# Patient Record
Sex: FEMALE | Race: WHITE | Employment: OTHER | URBAN - METROPOLITAN AREA
[De-identification: names, ages, dates, MRNs, and addresses within clinical notes are randomized per-mention and may not be internally consistent; named-entity substitution may affect disease eponyms.]

---

## 2017-04-25 ENCOUNTER — OFFICE VISIT (OUTPATIENT)
Dept: CARDIOLOGY CLINIC | Age: 77
End: 2017-04-25

## 2017-04-25 VITALS
SYSTOLIC BLOOD PRESSURE: 124 MMHG | WEIGHT: 175 LBS | DIASTOLIC BLOOD PRESSURE: 70 MMHG | OXYGEN SATURATION: 97 % | HEART RATE: 77 BPM | BODY MASS INDEX: 29.16 KG/M2 | RESPIRATION RATE: 16 BRPM | HEIGHT: 65 IN

## 2017-04-25 DIAGNOSIS — R60.9 SWELLING: ICD-10-CM

## 2017-04-25 DIAGNOSIS — R06.02 SOB (SHORTNESS OF BREATH): ICD-10-CM

## 2017-04-25 DIAGNOSIS — Z00.00 ROUTINE CHECK-UP: ICD-10-CM

## 2017-04-25 DIAGNOSIS — I25.10 CORONARY ARTERY DISEASE INVOLVING NATIVE CORONARY ARTERY OF NATIVE HEART WITHOUT ANGINA PECTORIS: Primary | ICD-10-CM

## 2017-04-25 DIAGNOSIS — I48.0 PAROXYSMAL ATRIAL FIBRILLATION (HCC): ICD-10-CM

## 2017-04-25 RX ORDER — PRAVASTATIN SODIUM 20 MG/1
20 TABLET ORAL
Qty: 90 TAB | Refills: 3 | Status: SHIPPED | OUTPATIENT
Start: 2017-04-25 | End: 2017-04-27 | Stop reason: SDUPTHER

## 2017-04-25 NOTE — PROGRESS NOTES
HISTORY OF PRESENT ILLNESS  Jacqueline Montgomery is a 77 y.o. female. She had bypass surgery about two years ago. She has never had chest pain. She has mild renal insufficiency with a creatinine of 1.29. She has some trouble lifting her arms over her head and for this reason, her primary care physician stopped her Lipitor. She saw Dr. Yu Santos regarding her kidneys who started her on hydralazine 25 mg twice daily. She had been on amlodipine, but it was stopped because of low blood pressure. HPI  Patient Active Problem List   Diagnosis Code    Hypotension I95.9    CAD (coronary artery disease) I25.10    S/P coronary artery stent placement Z95.5    Renal failure N19    Elevated troponin R74.8    Pericardial effusion I31.3    Lactic acidosis E87.2    AF (atrial fibrillation) ([facility]) I48.91    Anemia D64.9    GI bleed K92.2    Syncope R55    Bradycardia R00.1    MORENO (acute kidney injury) ([facility] [address] N17.9    Postoperative anemia due to acute blood loss D62    S/P CABG (coronary artery bypass graft) Z95.1    Edema R60.9    Diabetes mellitus ([facility]) E11.9    CKD (chronic kidney disease), stage III N18.3     Current Outpatient Prescriptions   Medication Sig Dispense Refill    HYDRALAZINE HCL (APRESOLINE PO) Take 25 mg by mouth two (2) times a day.  pravastatin (PRAVACHOL) 20 mg tablet Take 1 Tab by mouth nightly. 90 Tab 3    oxyCODONE-acetaminophen (PERCOCET 7.5) 7.5-325 mg per tablet Take  by mouth every four (4) hours as needed for Pain.  methocarbamol (ROBAXIN) 750 mg tablet Take  by mouth three (3) times daily.  VIT A/VIT C/VIT E/ZINC/COPPER (PRESERVISION AREDS PO) Take  by mouth.  furosemide (LASIX) 40 mg tablet Take 1 Tab by mouth daily. 90 Tab 3    carvedilol (COREG) 12.5 mg tablet Take 1 Tab by mouth two (2) times daily (with meals). 180 Tab 3    FERROUS FUMARATE/VIT BCOMP,C (SUPER B COMPLEX PO) Take  by mouth.       omega-3 fatty acids-vitamin e 1,000 mg cap Take 500 Caps by mouth.  Cholecalciferol, Vitamin D3, 1,000 unit cap Take  by mouth.  LACTOBACILLUS RHAMNOSUS GG (CULTURELLE PO) Take  by mouth.  senna-docusate (PERICOLACE) 8.6-50 mg per tablet Take 1 Tab by mouth two (2) times a day. 30 Tab 0    pantoprazole (PROTONIX) 40 mg tablet Take 40 mg by mouth Daily (before breakfast).  aspirin delayed-release 81 mg tablet Take 81 mg by mouth every evening.  zolpidem (AMBIEN) 10 mg tablet Take 10 mg by mouth nightly as needed for Sleep.  clonazePAM (KLONOPIN) 1 mg tablet Take 1 Tab by mouth two (2) times a day. 20 Tab 0    DULoxetine (CYMBALTA) 60 mg capsule Take 120 mg by mouth daily.  multivitamins-minerals-lutein (CENTRUM SILVER) tab tablet Take 1 Tab by mouth daily. Past Medical History:   Diagnosis Date    Atrial fibrillation ([facility] [address]     CAD (coronary artery disease) 2007    stents    Chronic kidney disease     Depression     Diabetes ([facility] [address]     H/O: GI bleed     increased NSAID use    High cholesterol     Hypertension     MI, old 2007    Valvular heart disease      Past Surgical History:   Procedure Laterality Date    CARDIAC SURG PROCEDURE UNLIST      cardiac stents    HX TONSILLECTOMY         Review of Systems   Musculoskeletal: Positive for myalgias. All other systems reviewed and are negative. Visit Vitals    /70 (BP 1 Location: Left arm, BP Patient Position: Sitting)    Pulse 77    Resp 16    Ht 5' 5\" (1.651 m)    Wt 175 lb (79.4 kg)    SpO2 97%    BMI 29.12 kg/m2       Physical Exam   Constitutional: She is oriented to person, place, and time. She appears well-nourished. HENT:   Head: Atraumatic. Eyes: Conjunctivae are normal.   Neck: Neck supple. Cardiovascular: Normal rate, regular rhythm and normal heart sounds. Exam reveals no gallop and no friction rub. No murmur heard. Pulmonary/Chest: Breath sounds normal. She has no wheezes.    Abdominal: Bowel sounds are normal. Musculoskeletal: She exhibits no edema. Neurological: She is oriented to person, place, and time. Skin: Skin is dry. Nursing note and vitals reviewed. ASSESSMENT and PLAN  Overall, I think she is doing well. She is concerned about her cholesterol. I am not sure the Lipitor was giving her the problems with her arms. It sounds more like cervical spine disease. Nevertheless, I will start her back on a different statin - Pravachol at 20 mg per day. She will return in six months and will have a lipid profile, as well as other blood work, prior to return.

## 2017-04-25 NOTE — MR AVS SNAPSHOT
Visit Information Date & Time Provider Department Dept. Phone Encounter #  
 4/25/2017 10:40 AM Godfrey Glaser MD [facility] [place] [facility] [phone] [id number] Upcoming Health Maintenance Date Due  
 FOOT EXAM Q1 6/18/1950 MICROALBUMIN Q1 6/18/1950 EYE EXAM RETINAL OR DILATED Q1 6/18/1950 DTaP/Tdap/Td series (1 - Tdap) 6/18/1962 ZOSTER VACCINE AGE 60> 6/18/2001 GLAUCOMA SCREENING Q2Y 6/18/2006 OSTEOPOROSIS SCREENING (DEXA) 6/18/2006 MEDICARE YEARLY EXAM 6/18/2006 HEMOGLOBIN A1C Q6M 12/29/2015 INFLUENZA AGE 9 TO ADULT 8/1/2016 LIPID PANEL Q1 10/5/2016 Pneumococcal 65+ High/Highest Risk (2 of 2 - PPSV23) 10/3/2017 Allergies as of 4/25/2017  Review Complete On: 4/25/2017 By: Rayray Gilliland LPN Severity Noted Reaction Type Reactions Amiodarone  02/10/2015    Nausea Only Tolerates IV amiodarone with no problems Current Immunizations  Reviewed on 2/15/2015 Name Date Pneumococcal Vaccine (Unspecified Type) 10/3/2012 Not reviewed this visit You Were Diagnosed With   
  
 Codes Comments SOB (shortness of breath)    -  Primary ICD-10-CM: R06.02 
ICD-9-CM: 786.05 Swelling     ICD-10-CM: R60.9 ICD-9-CM: 194. 3 Coronary artery disease involving native coronary artery of native heart without angina pectoris     ICD-10-CM: I25.10 ICD-9-CM: 414.01 Paroxysmal atrial fibrillation (HCC)     ICD-10-CM: I48.0 ICD-9-CM: 427.31 Routine check-up     ICD-10-CM: Z00.00 ICD-9-CM: V70.0 Vitals BP Pulse Resp Height(growth percentile) Weight(growth percentile) SpO2  
 124/70 (BP 1 Location: Left arm, BP Patient Position: Sitting) 77 16 5' 5\" (1.651 m) 175 lb (79.4 kg) 97% BMI OB Status Smoking Status 29.12 kg/m2 Postmenopausal Former Smoker Vitals History BMI and BSA Data Body Mass Index Body Surface Area  
 29.12 kg/m 2 1.91 m 2 Preferred Pharmacy Pharmacy Name Phone [address] [facility] [place] [phone] Your Updated Medication List  
  
   
This list is accurate as of: 4/25/17 11:14 AM.  Always use your most recent med list.  
  
  
  
  
 APRESOLINE PO Take 25 mg by mouth two (2) times a day. aspirin delayed-release 81 mg tablet Take 81 mg by mouth every evening. atorvastatin 80 mg tablet Commonly known as:  LIPITOR Take 1 Tab by mouth daily. carvedilol 12.5 mg tablet Commonly known as:  Geannie Clos Take 1 Tab by mouth two (2) times daily (with meals). CENTRUM SILVER Tab tablet Generic drug:  multivitamins-minerals-lutein Take 1 Tab by mouth daily. cholecalciferol 1,000 unit Cap Commonly known as:  VITAMIN D3 Take  by mouth.  
  
 clonazePAM 1 mg tablet Commonly known as:  [place] Take 1 Tab by mouth two (2) times a day. CULTURELLE PO Take  by mouth. DULoxetine 60 mg capsule Commonly known as:  CYMBALTA Take 120 mg by mouth daily. furosemide 40 mg tablet Commonly known as:  LASIX Take 1 Tab by mouth daily. methocarbamol 750 mg tablet Commonly known as:  ROBAXIN Take  by mouth three (3) times daily. omega-3 fatty acids-vitamin e 1,000 mg Cap Take 500 Caps by mouth. oxyCODONE-acetaminophen 7.5-325 mg per tablet Commonly known as:  PERCOCET 7.5 Take  by mouth every four (4) hours as needed for Pain.  
  
 pantoprazole 40 mg tablet Commonly known as:  PROTONIX Take 40 mg by mouth Daily (before breakfast). PRESERVISION AREDS PO Take  by mouth. senna-docusate 8.6-50 mg per tablet Commonly known as:  Sheliah Montane Take 1 Tab by mouth two (2) times a day. SUPER B COMPLEX PO Take  by mouth.  
  
 zolpidem 10 mg tablet Commonly known as:  AMBIEN Take 10 mg by mouth nightly as needed for Sleep. We Performed the Following AMB POC EKG ROUTINE W/ 12 LEADS, INTER & REP [92041 CPT(R)] Introducing [facility] & HE[facility] SERVICES! Luz Maria Josephine introduces [employer] patient portal. Now you can access parts of your medical record, email your doctor's office, and request medication refills online. 1. In your internet browser, go to [email or web address] 2. Click on the First Time User? Click Here link in the Sign In box. You will see the New Member Sign Up page. 3. Enter your Polantis Access Code exactly as it appears below. You will not need to use this code after youve completed the sign-up process. If you do not sign up before the expiration date, you must request a new code. · Polantis Access Code: [email or web address] 
Expires: 7/24/2017 11:11 AM 
 
4. Enter the last four digits of your Social Security Number (xxxx) and Date of Birth (mm/dd/yyyy) as indicated and click Submit. You will be taken to the next sign-up page. 5. Create a Polantis ID. This will be your Polantis login ID and cannot be changed, so think of one that is secure and easy to remember. 6. Create a Polantis password. You can change your password at any time. 7. Enter your Password Reset Question and Answer. This can be used at a later time if you forget your password. 8. Enter your e-mail address. You will receive e-mail notification when new information is available in [address] 9. Click Sign Up. You can now view and download portions of your medical record. 10. Click the Download Summary menu link to download a portable copy of your medical information. If you have questions, please visit the Frequently Asked Questions section of the Polantis website. Remember, Pol[employer] is NOT to be used for urgent needs. For medical emergencies, dial 911. Now available from your iPhone and Android! Please provide this summary of care documentation to your next provider. Your primary care clinician is listed as Inell Desha.  If you have any questions after today's visit, please call [phone].

## 2017-04-27 DIAGNOSIS — I25.10 CORONARY ARTERY DISEASE INVOLVING NATIVE CORONARY ARTERY OF NATIVE HEART WITHOUT ANGINA PECTORIS: Primary | ICD-10-CM

## 2017-04-27 RX ORDER — PRAVASTATIN SODIUM 20 MG/1
20 TABLET ORAL
Qty: 90 TAB | Refills: 3 | Status: SHIPPED | OUTPATIENT
Start: 2017-04-27 | End: 2018-12-24 | Stop reason: DRUGHIGH

## 2017-10-25 ENCOUNTER — OFFICE VISIT (OUTPATIENT)
Dept: CARDIOLOGY CLINIC | Age: 77
End: 2017-10-25

## 2017-10-25 VITALS
DIASTOLIC BLOOD PRESSURE: 70 MMHG | SYSTOLIC BLOOD PRESSURE: 130 MMHG | WEIGHT: 166 LBS | HEART RATE: 60 BPM | BODY MASS INDEX: 27.66 KG/M2 | HEIGHT: 65 IN

## 2017-10-25 DIAGNOSIS — I25.10 CORONARY ARTERY DISEASE INVOLVING NATIVE CORONARY ARTERY OF NATIVE HEART WITHOUT ANGINA PECTORIS: Primary | ICD-10-CM

## 2017-10-25 DIAGNOSIS — M17.0 OSTEOARTHRITIS OF BOTH KNEES, UNSPECIFIED OSTEOARTHRITIS TYPE: ICD-10-CM

## 2017-10-25 DIAGNOSIS — I48.0 PAROXYSMAL ATRIAL FIBRILLATION (HCC): ICD-10-CM

## 2017-10-25 DIAGNOSIS — E11.9 TYPE 2 DIABETES MELLITUS WITHOUT COMPLICATION, WITHOUT LONG-TERM CURRENT USE OF INSULIN (HCC): ICD-10-CM

## 2017-10-25 DIAGNOSIS — Z95.5 S/P CORONARY ARTERY STENT PLACEMENT: ICD-10-CM

## 2017-10-25 DIAGNOSIS — Z95.1 S/P CABG (CORONARY ARTERY BYPASS GRAFT): ICD-10-CM

## 2017-10-25 NOTE — PROGRESS NOTES
HISTORY OF PRESENT ILLNESS  La Becerra is a 69 y.o. female. She is seen for preoperative clearance prior to right total knee replacement by Dr. Kezia Ngo at [facility].  She had a fall recently due to her unstable right knee. She had coronary artery bypass surgery done in February of 2015. She had three bypasses at that time and has done well since. A subsequent echo showed normal left ventricular function. She has no energy and it causes her a great deal of pain to walk. She is on Pravachol and her cholesterol at least one year ago was only 143. HPI  Patient Active Problem List   Diagnosis Code    Hypotension I95.9    CAD (coronary artery disease) I25.10    S/P coronary artery stent placement Z95.5    Renal failure N19    Elevated troponin R74.8    Pericardial effusion I31.3    Lactic acidosis E87.2    AF (atrial fibrillation) ([facility]) I48.91    Anemia D64.9    GI bleed K92.2    Syncope R55    Bradycardia R00.1    MORENO (acute kidney injury) ([facility] [address] N17.9    Postoperative anemia due to acute blood loss D62    S/P CABG (coronary artery bypass graft) Z95.1    Edema R60.9    Diabetes mellitus ([facility]) E11.9    CKD (chronic kidney disease), stage III N18.3     Current Outpatient Prescriptions   Medication Sig Dispense Refill    pravastatin (PRAVACHOL) 20 mg tablet Take 1 Tab by mouth nightly. 90 Tab 3    HYDRALAZINE HCL (APRESOLINE PO) Take 25 mg by mouth two (2) times a day.  methocarbamol (ROBAXIN) 750 mg tablet Take  by mouth three (3) times daily.  VIT A/VIT C/VIT E/ZINC/COPPER (PRESERVISION AREDS PO) Take  by mouth.  furosemide (LASIX) 40 mg tablet Take 1 Tab by mouth daily. 90 Tab 3    carvedilol (COREG) 12.5 mg tablet Take 1 Tab by mouth two (2) times daily (with meals). 180 Tab 3    FERROUS FUMARATE/VIT BCOMP,C (SUPER B COMPLEX PO) Take  by mouth.  omega-3 fatty acids-vitamin e 1,000 mg cap Take 500 Caps by mouth.       Cholecalciferol, Vitamin D3, 1,000 unit cap Take  by mouth.  LACTOBACILLUS RHAMNOSUS GG (CULTURELLE PO) Take  by mouth.  pantoprazole (PROTONIX) 40 mg tablet Take 40 mg by mouth Daily (before breakfast).  aspirin delayed-release 81 mg tablet Take 81 mg by mouth every evening.  zolpidem (AMBIEN) 10 mg tablet Take 10 mg by mouth nightly as needed for Sleep.  clonazePAM (KLONOPIN) 1 mg tablet Take 1 Tab by mouth two (2) times a day. 20 Tab 0    DULoxetine (CYMBALTA) 60 mg capsule Take 120 mg by mouth daily.  multivitamins-minerals-lutein (CENTRUM SILVER) tab tablet Take 1 Tab by mouth daily.  oxyCODONE-acetaminophen (PERCOCET 7.5) 7.5-325 mg per tablet Take  by mouth every four (4) hours as needed for Pain.  senna-docusate (PERICOLACE) 8.6-50 mg per tablet Take 1 Tab by mouth two (2) times a day. 27 Tab 0     Past Medical History:   Diagnosis Date    Atrial fibrillation ([facility] [address]     CAD (coronary artery disease) 2007    stents    Chronic kidney disease     Depression     Diabetes ([facility] [address]     H/O: GI bleed     increased NSAID use    High cholesterol     Hypertension     MI, old 2007    Valvular heart disease      Past Surgical History:   Procedure Laterality Date    CARDIAC SURG PROCEDURE UNLIST      cardiac stents    HX TONSILLECTOMY         Review of Systems   Constitutional: Positive for malaise/fatigue. Musculoskeletal: Positive for joint pain. All other systems reviewed and are negative. Visit Vitals    /70    Pulse 60    Ht 5' 5\" (1.651 m)    Wt 166 lb (75.3 kg)    BMI 27.62 kg/m2       Physical Exam   Constitutional: She is oriented to person, place, and time. She appears well-nourished. HENT:   Head: Atraumatic. Eyes: Conjunctivae are normal.   Neck: Neck supple. Cardiovascular: Normal rate, regular rhythm and normal heart sounds. Exam reveals no gallop and no friction rub. No murmur heard.   Pulmonary/Chest: Breath sounds normal. She has no wheezes. Abdominal: Bowel sounds are normal.   Musculoskeletal: She exhibits no edema. Neurological: She is oriented to person, place, and time. Skin: Skin is dry. Psychiatric: Her behavior is normal.   Nursing note and vitals reviewed. ASSESSMENT and PLAN  She has no symptoms of angina and has normal left ventricular function. She is only slightly more than two years since her bypass. The operative note was reviewed by me today. She had a LIMA graft to the LAD and also saphenous vein grafts to a diagonal and to a marginal.  I believe that she should be able to have her orthopaedic surgery with low cardiac risk and I do not feel she needs any further testing prior to the surgery. I will plan to see her back in six months.

## 2017-10-25 NOTE — MR AVS SNAPSHOT
Visit Information Date & Time Provider Department Dept. Phone Encounter #  
 10/25/2017 10:20 AM Марина Aguilera MD [facility] [phone] [id number] Upcoming Health Maintenance Date Due  
 FOOT EXAM Q1 6/18/1950 MICROALBUMIN Q1 6/18/1950 EYE EXAM RETINAL OR DILATED Q1 6/18/1950 DTaP/Tdap/Td series (1 - Tdap) 6/18/1962 ZOSTER VACCINE AGE 60> 4/18/2001 GLAUCOMA SCREENING Q2Y 6/18/2006 OSTEOPOROSIS SCREENING (DEXA) 6/18/2006 MEDICARE YEARLY EXAM 6/18/2006 INFLUENZA AGE 9 TO ADULT 8/1/2017 Pneumococcal 65+ High/Highest Risk (2 of 2 - PPSV23) 10/3/2017 HEMOGLOBIN A1C Q6M 11/27/2017 LIPID PANEL Q1 5/27/2018 Allergies as of 10/25/2017  Review Complete On: 10/25/2017 By: Louise Tavarez LPN Severity Noted Reaction Type Reactions Amiodarone  02/10/2015    Nausea Only Tolerates IV amiodarone with no problems Current Immunizations  Reviewed on 2/15/2015 Name Date Pneumococcal Vaccine (Unspecified Type) 10/3/2012 Not reviewed this visit Vitals BP Pulse Height(growth percentile) Weight(growth percentile) BMI OB Status 130/70 60 5' 5\" (1.651 m) 166 lb (75.3 kg) 27.62 kg/m2 Postmenopausal  
 Smoking Status Former Smoker Vitals History BMI and BSA Data Body Mass Index Body Surface Area  
 27.62 kg/m 2 1.86 m 2 Preferred Pharmacy Pharmacy Name Phone [address] [facility] [place] [phone] Your Updated Medication List  
  
   
This list is accurate as of: 10/25/17 10:27 AM.  Always use your most recent med list.  
  
  
  
  
 APRESOLINE PO Take 25 mg by mouth two (2) times a day. aspirin delayed-release 81 mg tablet Take 81 mg by mouth every evening. carvedilol 12.5 mg tablet Commonly known as:  Parveen Peat Take 1 Tab by mouth two (2) times daily (with meals). CENTRUM SILVER Tab tablet Generic drug:  multivitamins-minerals-lutein Take 1 Tab by mouth daily. cholecalciferol 1,000 unit Cap Commonly known as:  VITAMIN D3 Take  by mouth.  
  
 clonazePAM 1 mg tablet Commonly known as:  Zetta Zack Take 1 Tab by mouth two (2) times a day. CULTURELLE PO Take  by mouth. DULoxetine 60 mg capsule Commonly known as:  CYMBALTA Take 120 mg by mouth daily. furosemide 40 mg tablet Commonly known as:  LASIX Take 1 Tab by mouth daily. methocarbamol 750 mg tablet Commonly known as:  ROBAXIN Take  by mouth three (3) times daily. omega-3 fatty acids-vitamin e 1,000 mg Cap Take 500 Caps by mouth. oxyCODONE-acetaminophen 7.5-325 mg per tablet Commonly known as:  PERCOCET 7.5 Take  by mouth every four (4) hours as needed for Pain.  
  
 pantoprazole 40 mg tablet Commonly known as:  PROTONIX Take 40 mg by mouth Daily (before breakfast). pravastatin 20 mg tablet Commonly known as:  PRAVACHOL Take 1 Tab by mouth nightly. PRESERVISION AREDS PO Take  by mouth. senna-docusate 8.6-50 mg per tablet Commonly known as:  Ulysess Caras Take 1 Tab by mouth two (2) times a day. SUPER B COMPLEX PO Take  by mouth.  
  
 zolpidem 10 mg tablet Commonly known as:  AMBIEN Take 10 mg by mouth nightly as needed for Sleep. Introducing [facility]! Josue Donovan introduces [employer] patient portal. Now you can access parts of your medical record, email your doctor's office, and request medication refills online. 1. In your internet browser, go to [email or web address] acquired [employer] 2. Click on the First Time User? Click Here link in the Sign In box. You will see the New Member Sign Up page. 3. Enter your [employer] Access Code exactly as it appears below. You will not need to use this code after youve completed the sign-up process. If you do not sign up before the expiration date, you must request a new code. · [employer] Access Code: [email or web address] Expires: 1/23/2018 10:27 AM 
 
4. Enter the last four digits of your Social Security Number (xxxx) and Date of Birth (mm/dd/yyyy) as indicated and click Submit. You will be taken to the next sign-up page. 5. Create a [employer] ID. This will be your [employer] login ID and cannot be changed, so think of one that is secure and easy to remember. 6. Create a [employer] password. You can change your password at any time. 7. Enter your Password Reset Question and Answer. This can be used at a later time if you forget your password. 8. Enter your e-mail address. You will receive e-mail notification when new information is available in [address] 9. Click Sign Up. You can now view and download portions of your medical record. 10. Click the Download Summary menu link to download a portable copy of your medical information. If you have questions, please visit the Frequently Asked Questions section of the [employer] website. Remember, [employer] is NOT to be used for urgent needs. For medical emergencies, dial 911. Now available from your iPhone and Android! Please provide this summary of care documentation to your next provider. Your primary care clinician is listed as Mima Moran. If you have any questions after today's visit, please call [phone].

## 2018-05-25 ENCOUNTER — OFFICE VISIT (OUTPATIENT)
Dept: CARDIOLOGY CLINIC | Age: 78
End: 2018-05-25

## 2018-05-25 VITALS
HEIGHT: 65 IN | BODY MASS INDEX: 25.83 KG/M2 | SYSTOLIC BLOOD PRESSURE: 150 MMHG | RESPIRATION RATE: 18 BRPM | OXYGEN SATURATION: 98 % | WEIGHT: 155 LBS | HEART RATE: 65 BPM | DIASTOLIC BLOOD PRESSURE: 80 MMHG

## 2018-05-25 DIAGNOSIS — Z95.5 S/P CORONARY ARTERY STENT PLACEMENT: ICD-10-CM

## 2018-05-25 DIAGNOSIS — E11.9 TYPE 2 DIABETES MELLITUS WITHOUT COMPLICATION, WITHOUT LONG-TERM CURRENT USE OF INSULIN (HCC): ICD-10-CM

## 2018-05-25 DIAGNOSIS — I48.0 PAROXYSMAL ATRIAL FIBRILLATION (HCC): ICD-10-CM

## 2018-05-25 DIAGNOSIS — I25.83 CORONARY ARTERY DISEASE DUE TO LIPID RICH PLAQUE: Primary | ICD-10-CM

## 2018-05-25 DIAGNOSIS — I25.10 CORONARY ARTERY DISEASE DUE TO LIPID RICH PLAQUE: Primary | ICD-10-CM

## 2018-05-25 DIAGNOSIS — I25.10 CORONARY ARTERY DISEASE INVOLVING NATIVE CORONARY ARTERY OF NATIVE HEART WITHOUT ANGINA PECTORIS: Primary | ICD-10-CM

## 2018-05-25 DIAGNOSIS — Z95.1 S/P CABG (CORONARY ARTERY BYPASS GRAFT): ICD-10-CM

## 2018-05-25 NOTE — PROGRESS NOTES
HISTORY OF PRESENT ILLNESS  Alan Espinoza is a 69 y.o. female. She has coronary disease and had bypass surgery several years ago. She had her right knee replaced recently and is doing well. She has lost 11 pounds. Her systolic pressure is slightly higher but recently checked by another doctor and it was fine. She is still undergoing physical therapy. She has a remote history of atrial fibrillation possibly related to surgery, but has been in sinus rhythm for years. She may have some forgetfulness. HPI  Patient Active Problem List   Diagnosis Code    Hypotension I95.9    CAD (coronary artery disease) I25.10    S/P coronary artery stent placement Z95.5    Renal failure N19    Elevated troponin R74.8    Pericardial effusion I31.3    Lactic acidosis E87.2    AF (atrial fibrillation) ([facility]) I48.91    Anemia D64.9    GI bleed K92.2    Syncope R55    Bradycardia R00.1    MORENO (acute kidney injury) ([facility] [address] N17.9    Postoperative anemia due to acute blood loss D62    S/P CABG (coronary artery bypass graft) Z95.1    Edema R60.9    Diabetes mellitus ([facility]) E11.9    CKD (chronic kidney disease), stage III N18.3     Current Outpatient Prescriptions   Medication Sig Dispense Refill    pravastatin (PRAVACHOL) 20 mg tablet Take 1 Tab by mouth nightly. (Patient taking differently: Take 40 mg by mouth nightly.) 90 Tab 3    HYDRALAZINE HCL (APRESOLINE PO) Take 25 mg by mouth two (2) times a day.  VIT A/VIT C/VIT E/ZINC/COPPER (PRESERVISION AREDS PO) Take  by mouth.  carvedilol (COREG) 12.5 mg tablet Take 1 Tab by mouth two (2) times daily (with meals). 180 Tab 3    FERROUS FUMARATE/VIT BCOMP,C (SUPER B COMPLEX PO) Take  by mouth.  omega-3 fatty acids-vitamin e 1,000 mg cap Take 500 Caps by mouth.  Cholecalciferol, Vitamin D3, 1,000 unit cap Take  by mouth.  LACTOBACILLUS RHAMNOSUS GG (CULTURELLE PO) Take  by mouth.       senna-docusate (PERICOLACE) 8.6-50 mg per tablet Take 1 Tab by mouth two (2) times a day. 30 Tab 0    pantoprazole (PROTONIX) 40 mg tablet Take 40 mg by mouth Daily (before breakfast).  aspirin delayed-release 81 mg tablet Take 81 mg by mouth every evening.  zolpidem (AMBIEN) 10 mg tablet Take 10 mg by mouth nightly as needed for Sleep.  clonazePAM (KLONOPIN) 1 mg tablet Take 1 Tab by mouth two (2) times a day. 20 Tab 0    DULoxetine (CYMBALTA) 60 mg capsule Take 120 mg by mouth daily.  multivitamins-minerals-lutein (CENTRUM SILVER) tab tablet Take 1 Tab by mouth daily.  oxyCODONE-acetaminophen (PERCOCET 7.5) 7.5-325 mg per tablet Take  by mouth every four (4) hours as needed for Pain.  methocarbamol (ROBAXIN) 750 mg tablet Take  by mouth three (3) times daily.  furosemide (LASIX) 40 mg tablet Take 1 Tab by mouth daily. 80 Tab 3     Past Medical History:   Diagnosis Date    Atrial fibrillation ([facility] [address]     CAD (coronary artery disease) 2007    stents    Chronic kidney disease     Depression     Diabetes ([facility] [address]     H/O: GI bleed     increased NSAID use    High cholesterol     Hypertension     MI, old 2007    Valvular heart disease      Past Surgical History:   Procedure Laterality Date    CARDIAC SURG PROCEDURE UNLIST      cardiac stents    HX TONSILLECTOMY         Review of Systems   Cardiovascular: Positive for leg swelling. Musculoskeletal: Positive for joint pain. All other systems reviewed and are negative. Visit Vitals    /80 (BP 1 Location: Left arm, BP Patient Position: Sitting)    Pulse 65    Resp 18    Ht 5' 5\" (1.651 m)    Wt 155 lb (70.3 kg)    SpO2 98%    BMI 25.79 kg/m2       Physical Exam   Constitutional: She appears well-nourished. HENT:   Head: Atraumatic. Eyes: Conjunctivae are normal.   Neck: Neck supple. Cardiovascular: Normal rate, regular rhythm and normal heart sounds. Exam reveals no gallop and no friction rub. No murmur heard.   Pulmonary/Chest: Breath sounds normal. She has no wheezes. Abdominal: Bowel sounds are normal.   Musculoskeletal: She exhibits no tenderness. Neurological: No cranial nerve deficit. Skin: Skin is dry. Nursing note and vitals reviewed. ASSESSMENT and PLAN  She seems to be doing fairly well with regard to her heart. She does seem to have some forgetfulness. I will keep her regimen unchanged for the present and see her in six months. Another doctor told her she should have her carotids checked so I will do a duplex when she returns. I cannot find if it was done in the past even though she has has heart surgery.

## 2018-05-25 NOTE — MR AVS SNAPSHOT
[address] [place] 
[phone] Patient: Kasey Cherry MRN: [id number] CRV:[id number]/36/[id number] Visit Information Date & Time Provider Department Dept. Phone Encounter #  
 5/25/2018  1:20 PM Jo Duncan MD [facility] [place] [phone] [id number] Your Appointments 5/25/2018  1:20 PM  
ESTABLISHED PATIENT with Jo Duncan MD  
[facility] (JUAN R SCHEDULING) Appt Note: 6 mo f/u per Dr. Elisabet Jeter; lvm for pt to cb & r/s missed appt from 4/25 [facility]; Pt r/s from 04/25/18 6 mo f/u per Dr. Elisabet Jeter lvm for pt to cb & r/s missed appt from 4/25 [facility] [place]; 6 mo f/u per Dr. Elisabet Jeter; pt r/s from 5/22 to 5/25  
 [facility] [address] [place] [address]  
[address] [facility] [id number] Upcoming Health Maintenance Date Due  
 FOOT EXAM Q1 6/18/1950 MICROALBUMIN Q1 6/18/1950 EYE EXAM RETINAL OR DILATED Q1 6/18/1950 DTaP/Tdap/Td series (1 - Tdap) 6/18/1962 ZOSTER VACCINE AGE 60> 4/18/2001 GLAUCOMA SCREENING Q2Y 6/18/2006 Bone Densitometry (Dexa) Screening 6/18/2006 Pneumococcal 65+ High/Highest Risk (2 of 2 - PPSV23) 10/3/2017 HEMOGLOBIN A1C Q6M 11/27/2017 MEDICARE YEARLY EXAM 3/28/2018 LIPID PANEL Q1 5/27/2018 Influenza Age 5 to Adult 8/1/2018 Allergies as of 5/25/2018  Review Complete On: 10/25/2017 By: Jo Duncan MD  
  
 Severity Noted Reaction Type Reactions Amiodarone  02/10/2015    Nausea Only Tolerates IV amiodarone with no problems Current Immunizations  Reviewed on 2/15/2015 Name Date Pneumococcal Vaccine (Unspecified Type) 10/3/2012 Not reviewed this visit Vitals  BP Pulse Resp Height(growth percentile) Weight(growth percentile) SpO2  
 150/80 (BP 1 Location: Left arm, BP Patient Position: Sitting) 65 18 5' 5\" (1.651 m) 155 lb (70.3 kg) 98% BMI OB Status Smoking Status 25.79 kg/m2 Postmenopausal Former Smoker Vitals History BMI and BSA Data Body Mass Index Body Surface Area 25.79 kg/m 2 1.8 m 2 Preferred Pharmacy Pharmacy Name Phone [facility], [address] [phone] Your Updated Medication List  
  
   
This list is accurate as of 5/25/18  1:16 PM.  Always use your most recent med list.  
  
  
  
  
 APRESOLINE PO Take 25 mg by mouth two (2) times a day. aspirin delayed-release 81 mg tablet Take 81 mg by mouth every evening. carvedilol 12.5 mg tablet Commonly known as:  Efrain Avalos Take 1 Tab by mouth two (2) times daily (with meals). CENTRUM SILVER Tab tablet Generic drug:  multivitamins-minerals-lutein Take 1 Tab by mouth daily. cholecalciferol 1,000 unit Cap Commonly known as:  VITAMIN D3 Take  by mouth.  
  
 clonazePAM 1 mg tablet Commonly known as:  Milo Manzanilla Take 1 Tab by mouth two (2) times a day. CULTURELLE PO Take  by mouth. DULoxetine 60 mg capsule Commonly known as:  CYMBALTA Take 120 mg by mouth daily. furosemide 40 mg tablet Commonly known as:  LASIX Take 1 Tab by mouth daily. methocarbamol 750 mg tablet Commonly known as:  ROBAXIN Take  by mouth three (3) times daily. omega-3 fatty acids-vitamin e 1,000 mg Cap Take 500 Caps by mouth. oxyCODONE-acetaminophen 7.5-325 mg per tablet Commonly known as:  PERCOCET 7.5 Take  by mouth every four (4) hours as needed for Pain.  
  
 pantoprazole 40 mg tablet Commonly known as:  PROTONIX Take 40 mg by mouth Daily (before breakfast). pravastatin 20 mg tablet Commonly known as:  PRAVACHOL Take 1 Tab by mouth nightly. PRESERVISION AREDS PO Take  by mouth. senna-docusate 8.6-50 mg per tablet Commonly known as:  Solange Nascimento  
 Take 1 Tab by mouth two (2) times a day. SUPER B COMPLEX PO Take  by mouth.  
  
 zolpidem 10 mg tablet Commonly known as:  AMBIEN Take 10 mg by mouth nightly as needed for Sleep. Introducing [facility]! Bobo Machado introduces "[employer] patient portal. Now you can access parts of your medical record, email your doctor's office, and request medication refills online. 1. In your internet browser, go to [email or web address] 2. Click on the First Time User? Click Here link in the Sign In box. You will see the New Member Sign Up page. 3. Enter your "[employer] Access Code exactly as it appears below. You will not need to use this code after youve completed the sign-up process. If you do not sign up before the expiration date, you must request a new code. · "[employer] Access Code: [email or web address] Expires: 8/23/2018  1:16 PM 
 
4. Enter the last four digits of your Social Security Number (xxxx) and Date of Birth (mm/dd/yyyy) as indicated and click Submit. You will be taken to the next sign-up page. 5. Create a "[employer] ID. This will be your "[employer] login ID and cannot be changed, so think of one that is secure and easy to remember. 6. Create a "[employer] password. You can change your password at any time. 7. Enter your Password Reset Question and Answer. This can be used at a later time if you forget your password. 8. Enter your e-mail address. You will receive e-mail notification when new information is available in [address] 9. Click Sign Up. You can now view and download portions of your medical record. 10. Click the Download Summary menu link to download a portable copy of your medical information. If you have questions, please visit the Frequently Asked Questions section of the "[employer] website. Remember, "[employer] is NOT to be used for urgent needs. For medical emergencies, dial 911. Now available from your iPhone and Android! Please provide this summary of care documentation to your next provider. Your primary care clinician is listed as Sara Pj. If you have any questions after today's visit, please call [phone].

## 2018-11-15 ENCOUNTER — OFFICE VISIT (OUTPATIENT)
Dept: NEUROLOGY | Age: 78
End: 2018-11-15

## 2018-11-15 VITALS
BODY MASS INDEX: 27.92 KG/M2 | OXYGEN SATURATION: 97 % | WEIGHT: 167.6 LBS | HEART RATE: 90 BPM | RESPIRATION RATE: 18 BRPM | DIASTOLIC BLOOD PRESSURE: 70 MMHG | SYSTOLIC BLOOD PRESSURE: 102 MMHG | HEIGHT: 65 IN

## 2018-11-15 DIAGNOSIS — E53.8 B12 DEFICIENCY: ICD-10-CM

## 2018-11-15 DIAGNOSIS — G25.0 ESSENTIAL TREMOR: Primary | ICD-10-CM

## 2018-11-15 DIAGNOSIS — G62.9 PERIPHERAL POLYNEUROPATHY: ICD-10-CM

## 2018-11-15 RX ORDER — PRIMIDONE 50 MG/1
TABLET ORAL
Qty: 90 TAB | Refills: 5 | Status: SHIPPED | OUTPATIENT
Start: 2018-11-15 | End: 2018-12-04 | Stop reason: SDUPTHER

## 2018-11-15 RX ORDER — ARIPIPRAZOLE 15 MG/1
15 TABLET ORAL DAILY
Status: ON HOLD | COMMUNITY
End: 2020-05-01 | Stop reason: DRUGHIGH

## 2018-11-15 RX ORDER — PRIMIDONE 50 MG/1
50 TABLET ORAL
COMMUNITY
End: 2018-11-15 | Stop reason: SDUPTHER

## 2018-11-15 RX ORDER — HYDROXYZINE 50 MG/1
50 TABLET, FILM COATED ORAL 3 TIMES DAILY
COMMUNITY
End: 2020-03-18

## 2018-11-15 RX ORDER — DIAZEPAM 2 MG/1
2 TABLET ORAL EVERY 8 HOURS
COMMUNITY
End: 2018-12-28

## 2018-11-15 NOTE — PROGRESS NOTES
Neurology Consult Note HISTORY PROVIDED BY: patient Chief Complaint:  
Chief Complaint Patient presents with  Tremors Subjective:  
 Nayla Olivera is a 69 y.o. right handed female who presents in consultation for tremors. Pt reports onset of tremors 3 months ago out of the blue. She was in process of going through PT after knee replacement and could not attend the last few appts due to tremor. She reports feeling tremors throughout body, most noticed in hands with action. Cannot read her handwriting. When walking if feels like \"nerves\" in calves do not feel right. No numbness or pain, just a nervousness. Denies starting a new medication prior to onset. Brother had Parkinson's disease. Her PCP has started her on Primidone 50mg qhs which has helped, but wears off by end of day. In review of the EMR, she was seen by Dr. Sid Dang from Neurology in Feb, 2015 for tremors while hospitalized after a fall, found to have bradycardia and hypotension. The pt reported tremor over the last couple of months, noticed when trying to write something. No head or voice tremor at that time. There was some concern about PD due to dec facial expression, but tremor was c/w ET and no evidence of PD on exam.   
 
Note from her PCP received, office visit 10/12/18-patient complained of tremor she was referred for neurology consult and started on primidone 50 mg nightly. Additional medical history surgical history reviewed and added below. Past Medical History:  
Diagnosis Date  Anxiety disorder  Atrial fibrillation ([facility] [address]  CAD (coronary artery disease) 2007  
 stents, CABG x 3v  Chronic kidney disease  Depression  Diabetes ([facility] [address]  High cholesterol  History of peptic ulcer Bleeding ulcer with increased NSAID use  Hypertension  MI, old 2007  Valvular heart disease Past Surgical History:  
Procedure Laterality Date  HX CORONARY ARTERY BYPASS GRAFT  HX ORTHOPAEDIC Right  HX TONSILLECTOMY Social History Socioeconomic History  Marital status: SINGLE Spouse name: Not on file  Number of children: Not on file  Years of education: Not on file  Highest education level: Not on file Social Needs  Financial resource strain: Not on file  Food insecurity - worry: Not on file  Food insecurity - inability: Not on file  Transportation needs - medical: Not on file  Transportation needs - non-medical: Not on file Occupational History  Occupation: Retired realestate/teacher Tobacco Use  Smoking status: Former Smoker Types: Cigarettes  Smokeless tobacco: Never Used Substance and Sexual Activity  Alcohol use: Yes Alcohol/week: 0.0 oz  
  Comment: Rare  Drug use: No  
 Sexual activity: Not on file Other Topics Concern  Not on file Social History Narrative Lives in [facility] Family History Problem Relation Age of Onset  Heart Attack Mother 72 Dec 91yo  Hypertension Mother  Other Father Unknown  Parkinson's Disease Brother Objective: ROS Allergies Allergen Reactions  Amiodarone Nausea Only Tolerates IV amiodarone with no problems Meds: Outpatient Medications Prior to Visit Medication Sig Dispense Refill  diazePAM (VALIUM) 2 mg tablet Take  by mouth every eight (8) hours as needed for Anxiety.  hydrOXYzine HCl (ATARAX) 50 mg tablet Take 50 mg by mouth three (3) times daily as needed for Itching.  primidone (MYSOLINE) 50 mg tablet Take  by mouth three (3) times daily.  ARIPiprazole (ABILIFY) 10 mg tablet Take 10 mg by mouth daily.  pravastatin (PRAVACHOL) 20 mg tablet Take 1 Tab by mouth nightly. (Patient taking differently: Take 40 mg by mouth nightly.) 90 Tab 3  
 HYDRALAZINE HCL (APRESOLINE PO) Take 25 mg by mouth two (2) times a day.  methocarbamol (ROBAXIN) 750 mg tablet Take 500 mg by mouth three (3) times daily.  VIT A/VIT C/VIT E/ZINC/COPPER (PRESERVISION AREDS PO) Take  by mouth.  carvedilol (COREG) 12.5 mg tablet Take 1 Tab by mouth two (2) times daily (with meals). 180 Tab 3  FERROUS FUMARATE/VIT BCOMP,C (SUPER B COMPLEX PO) Take  by mouth.  omega-3 fatty acids-vitamin e 1,000 mg cap Take 500 Caps by mouth.  Cholecalciferol, Vitamin D3, 1,000 unit cap Take  by mouth.  LACTOBACILLUS RHAMNOSUS GG (CULTURELLE PO) Take  by mouth.  senna-docusate (PERICOLACE) 8.6-50 mg per tablet Take 1 Tab by mouth two (2) times a day. 30 Tab 0  
 pantoprazole (PROTONIX) 40 mg tablet Take 40 mg by mouth Daily (before breakfast).  aspirin delayed-release 81 mg tablet Take 81 mg by mouth every evening.  zolpidem (AMBIEN) 10 mg tablet Take 10 mg by mouth nightly as needed for Sleep.  DULoxetine (CYMBALTA) 60 mg capsule Take 120 mg by mouth daily.  multivitamins-minerals-lutein (CENTRUM SILVER) tab tablet Take 1 Tab by mouth daily.  oxyCODONE-acetaminophen (PERCOCET 7.5) 7.5-325 mg per tablet Take  by mouth every four (4) hours as needed for Pain.  furosemide (LASIX) 40 mg tablet Take 1 Tab by mouth daily. 90 Tab 3  clonazePAM (KLONOPIN) 1 mg tablet Take 1 Tab by mouth two (2) times a day. 20 Tab 0 No facility-administered medications prior to visit. Imaging: MRI Results (most recent): No results found for this or any previous visit. CT Results (most recent): 
Results from Hospital Encounter encounter on 06/03/14 CT ABD PELV WO CONT Narrative **Final Report** 
  
 
ICD Codes / Adm. Diagnosis: 35   / Diarrhea  diarrhea Examination:  CT ABD PELVIS WO CON  - [id number] - Diomedes  3 2014 12:08PM 
Accession No:  [id number] Reason:  abd pain, bloody stool REPORT: 
INDICATION: Abdominal pain, bloody stools.  
 
Multiple axial images were obtained from the dome of the diaphragm to the  
 pubic symphysis without the use of oral or intravenous contrast. Lung bases  
are clear. There is a small pericardial effusion. No renal or ureteral stone  
or hydronephrosis is evident. The solid organs are otherwise normal in  
appearance given the lack of intravenous contrast. Small gallstones are  
noted. 3.9 cm infrarenal abdominal aortic aneurysm. There is no small or  
large bowel distention. There appears to be mild left colon wall thickening  
and inflammation. Small fibroids are noted. There is a small amount of free  
fluid in the deep pelvis. IMPRESSION: Mild left colon wall thickening and inflammation compatible with  
colitis of indeterminate etiology. Small gallstones. Signing/Reading Doctor: Elida Liang ([id number]) Navid Celis ([id number])  Diomedes  3 2014 12:21PM                      
 
 
   
  
 
Reviewed records in [place] and eÃ‡ift tab today Lab Review Results for orders placed or performed in visit on 04/15/16 AMB EXT CREATININE Result Value Ref Range Creatinine, External 1.29 Exam: 
Visit Vitals /70 Pulse 90 Resp 18 Ht 5' 5\" (1.651 m) Wt 76 kg (167 lb 9.6 oz) SpO2 97% BMI 27.89 kg/m² General:  Alert, cooperative, no distress. Head:  Normocephalic, without obvious abnormality, atraumatic. Respiratory: 
Heart:   Non labored breathing Regular rate and rhythm, no murmurs Neck:   2+ carotids, right bruits Extremities: Warm, no cyanosis or edema. Pulses: 2+ radial pulses. Neurologic:  MS: Alert and oriented x 4, speech intact. Language intact. Attention and fund of knowledge appropriate. Recent and remote memory intact. Cranial Nerves: 
II: visual fields Pt had difficulty with right VFs II: pupils Equal, round, reactive to light II: optic disc   
III,VII: ptosis none III,IV,VI: extraocular muscles  EOMI, no nystagmus or diplopia V: facial light touch sensation  normal  
 VII: facial muscle function   symmetric VIII: hearing intact IX: soft palate elevation  normal  
XI: trapezius strength  5/5 XI: sternocleidomastoid strength 5/5 XII: tongue  Midline Motor: normal bulk and tone, action tremor Strength: 5/5 throughout, no PD Sensory: Dec to PP in feet, mod dec vibratory sensation Coordination: FTN intact, dysdiadochokinesia with NACHO bilaterally, dysmetria bilaterally with HTS, Romberg - unable to stand with feet together and eyes open Gait: Wide based unsteady gait with eyes open, tandem walking not attempted Reflexes: 2+ symmetric in UE, 1+knees, absent ankles, toes mute Assessment/Plan Pt is a 69 y.o. right handed female with DM, CAD, HTN, Hyperlipidemia, and Afib, with c/o onset of tremors 3 months ago, however, in review of EMR, tremor was present at least 4 years ago. Tremor is most noticeable in her hands with activity, particularly writing, improved slightly on Primidone 50mg qhs. Additionally, pt reports feeling very unsteady when walking, feels a \"nervousness\" in her legs. Exam with right carotid bruits, decreased sensation to pinprick and vibration in bilateral feet, dysmetria with heel to shin, unable to stand with feet together and eyes opened, wide-based unsteady gait, diminished reflexes in the lower extremities, and bilateral endpoint tremors with FTN. Tremor is consistent with essential tremor without evidence of Parkinson's or parkinsonism on exam.  Discussed the diagnosis of essential tremor and symptomatic treatment with primidone. Recommend increasing the dose to 1 tab twice a day, however, the patient has already done this on her own without improvement in her tremor, so recommend going up to 1 tab in the morning and 2 tabs at night. Additionally, patient has findings on exam consistent with peripheral neuropathy.   Peripheral neuropathy, along with musculoskeletal issues, and possibly hypotension with prior history of this mentioned in the EMR, could all be contributing to her gait instability. Patient reports that her last HgbA1c looked good. Will request recent labs from her PCP. Recommend B12/MMA and SPEP with IF to assess for other etiologies. Patient supposed to be taking a B12 vitamin but has been using a B complex vitamin, I recommend she switch to just B12 since excess vitamin B6 can cause neuropathy. Regarding right bruits, she is followed by Dr. Shine Paul in cardiology and has carotid dopplers ordered. Follow-up in clinic in 6 months, instructed to call me interim with any questions or concerns or if she feels she needs a higher dose of primidone. ICD-10-CM ICD-9-CM 1. Essential tremor G25.0 333.1 2. Peripheral polyneuropathy G62.9 356.9 SPEP AND MATTHEW, SERUM 3. B12 deficiency E53.8 266.2 VITAMIN B12  
   METHYLMALONIC ACID  
   SPEP AND MATTHEW, SERUM Signed: Ashley Barrett MD 
11/15/2018

## 2018-11-15 NOTE — PROGRESS NOTES
Ms. Yordy Sanchez presents as a new patient for evaluation of tremor. Her symptoms began three months ago. Depression screening done on patient.

## 2018-11-15 NOTE — PATIENT INSTRUCTIONS
A Healthy Lifestyle: Care Instructions Your Care Instructions A healthy lifestyle can help you feel good, stay at a healthy weight, and have plenty of energy for both work and play. A healthy lifestyle is something you can share with your whole family. A healthy lifestyle also can lower your risk for serious health problems, such as high blood pressure, heart disease, and diabetes. You can follow a few steps listed below to improve your health and the health of your family. Follow-up care is a key part of your treatment and safety. Be sure to make and go to all appointments, and call your doctor if you are having problems. It's also a good idea to know your test results and keep a list of the medicines you take. How can you care for yourself at home? · Do not eat too much sugar, fat, or fast foods. You can still have dessert and treats now and then. The goal is moderation. · Start small to improve your eating habits. Pay attention to portion sizes, drink less juice and soda pop, and eat more fruits and vegetables. ? Eat a healthy amount of food. A 3-ounce serving of meat, for example, is about the size of a deck of cards. Fill the rest of your plate with vegetables and whole grains. ? Limit the amount of soda and sports drinks you have every day. Drink more water when you are thirsty. ? Eat at least 5 servings of fruits and vegetables every day. It may seem like a lot, but it is not hard to reach this goal. A serving or helping is 1 piece of fruit, 1 cup of vegetables, or 2 cups of leafy, raw vegetables. Have an apple or some carrot sticks as an afternoon snack instead of a candy bar. Try to have fruits and/or vegetables at every meal. 
· Make exercise part of your daily routine. You may want to start with simple activities, such as walking, bicycling, or slow swimming. Try to be active 30 to 60 minutes every day.  You do not need to do all 30 to 60 minutes all at once. For example, you can exercise 3 times a day for 10 or 20 minutes. Moderate exercise is safe for most people, but it is always a good idea to talk to your doctor before starting an exercise program. 
· Keep moving. Edwena Rust the lawn, work in the garden, or [employer]. Take the stairs instead of the elevator at work. · If you smoke, quit. People who smoke have an increased risk for heart attack, stroke, cancer, and other lung illnesses. Quitting is hard, but there are ways to boost your chance of quitting tobacco for good. ? Use nicotine gum, patches, or lozenges. ? Ask your doctor about stop-smoking programs and medicines. ? Keep trying. In addition to reducing your risk of diseases in the future, you will notice some benefits soon after you stop using tobacco. If you have shortness of breath or asthma symptoms, they will likely get better within a few weeks after you quit. · Limit how much alcohol you drink. Moderate amounts of alcohol (up to 2 drinks a day for men, 1 drink a day for women) are okay. But drinking too much can lead to liver problems, high blood pressure, and other health problems. Family health If you have a family, there are many things you can do together to improve your health. · Eat meals together as a family as often as possible. · Eat healthy foods. This includes fruits, vegetables, lean meats and dairy, and whole grains. · Include your family in your fitness plan. Most people think of activities such as jogging or tennis as the way to fitness, but there are many ways you and your family can be more active. Anything that makes you breathe hard and gets your heart pumping is exercise. Here are some tips: 
? Walk to do errands or to take your child to school or the bus. 
? Go for a family bike ride after dinner instead of watching TV. Where can you learn more? Go to http://suraj-delaney.info/. Enter M717 in the search box to learn more about \"A Healthy Lifestyle: Care Instructions. \" Current as of: December 7, 2017 Content Version: 11.8 © 1936-7482 Healthwise, [employer]. Care instructions adapted under license by [employer] (which disclaims liability or warranty for this information). If you have questions about a medical condition or this instruction, always ask your healthcare professional. [place] [address] any warranty or liability for your use of this information.

## 2018-11-19 LAB
ALBUMIN SERPL ELPH-MCNC: 3.8 G/DL (ref 2.9–4.4)
ALBUMIN/GLOB SERPL: 1.4 {RATIO} (ref 0.7–1.7)
ALPHA1 GLOB SERPL ELPH-MCNC: 0.2 G/DL (ref 0–0.4)
ALPHA2 GLOB SERPL ELPH-MCNC: 0.7 G/DL (ref 0.4–1)
B-GLOBULIN SERPL ELPH-MCNC: 1 G/DL (ref 0.7–1.3)
GAMMA GLOB SERPL ELPH-MCNC: 0.9 G/DL (ref 0.4–1.8)
GLOBULIN SER-MCNC: 2.8 G/DL (ref 2.2–3.9)
IGA SERPL-MCNC: 154 MG/DL (ref 64–422)
IGG SERPL-MCNC: 766 MG/DL (ref 700–1600)
IGM SERPL-MCNC: 150 MG/DL (ref 26–217)
INTERPRETATION SERPL IEP-IMP: NORMAL
Lab: NORMAL
M PROTEIN SERPL ELPH-MCNC: NORMAL G/DL
METHYLMALONATE SERPL-SCNC: 210 NMOL/L (ref 0–378)
PLEASE NOTE:, 149534: NORMAL
PROT SERPL-MCNC: 6.6 G/DL (ref 6–8.5)
VIT B12 SERPL-MCNC: 835 PG/ML (ref 232–1245)

## 2018-12-03 ENCOUNTER — TELEPHONE (OUTPATIENT)
Dept: NEUROLOGY | Age: 78
End: 2018-12-03

## 2018-12-03 NOTE — TELEPHONE ENCOUNTER
Dr. Shmuel Rosenberg, please advise. (patient aware Dr. Shmuel Rosenberg is out of office for the next several days)

## 2018-12-03 NOTE — TELEPHONE ENCOUNTER
----- Message from Belgica Lo sent at 12/3/2018  2:31 PM EST -----  Regarding: Dr Katherine Scott  Pt is reporting that medication is not quite in effect yet, it has helped a little, but her hands are still shaky.     Best contact # [phone]

## 2018-12-04 RX ORDER — PRIMIDONE 50 MG/1
100 TABLET ORAL 2 TIMES DAILY
Qty: 120 TAB | Refills: 5 | Status: SHIPPED | OUTPATIENT
Start: 2018-12-04 | End: 2019-08-28 | Stop reason: SDUPTHER

## 2018-12-24 ENCOUNTER — APPOINTMENT (OUTPATIENT)
Dept: GENERAL RADIOLOGY | Age: 78
End: 2018-12-24
Attending: EMERGENCY MEDICINE
Payer: MEDICARE

## 2018-12-24 ENCOUNTER — APPOINTMENT (OUTPATIENT)
Dept: GENERAL RADIOLOGY | Age: 78
End: 2018-12-24
Attending: FAMILY MEDICINE
Payer: MEDICARE

## 2018-12-24 ENCOUNTER — APPOINTMENT (OUTPATIENT)
Dept: CT IMAGING | Age: 78
End: 2018-12-24
Attending: FAMILY MEDICINE
Payer: MEDICARE

## 2018-12-24 ENCOUNTER — APPOINTMENT (OUTPATIENT)
Dept: CT IMAGING | Age: 78
End: 2018-12-24
Attending: EMERGENCY MEDICINE
Payer: MEDICARE

## 2018-12-24 ENCOUNTER — HOSPITAL ENCOUNTER (OUTPATIENT)
Age: 78
Setting detail: OBSERVATION
Discharge: NURSING, REHAB OR LONG-TERM CARE | End: 2018-12-28
Attending: EMERGENCY MEDICINE | Admitting: FAMILY MEDICINE
Payer: MEDICARE

## 2018-12-24 DIAGNOSIS — S00.83XA FACIAL HEMATOMA, INITIAL ENCOUNTER: ICD-10-CM

## 2018-12-24 DIAGNOSIS — S43.005A DISLOCATION OF LEFT SHOULDER JOINT, INITIAL ENCOUNTER: Primary | ICD-10-CM
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
## 2018-12-24 LAB
ALBUMIN SERPL-MCNC: 3.8 G/DL (ref 3.5–5)
ALBUMIN/GLOB SERPL: 1 {RATIO} (ref 1.1–2.2)
ALP SERPL-CCNC: 119 U/L (ref 45–117)
ALT SERPL-CCNC: 30 U/L (ref 12–78)
ANION GAP SERPL CALC-SCNC: 11 MMOL/L (ref 5–15)
AST SERPL-CCNC: 41 U/L (ref 15–37)
ATRIAL RATE: 104 BPM
BASOPHILS # BLD: 0 K/UL (ref 0–0.1)
BASOPHILS NFR BLD: 0 % (ref 0–1)
BILIRUB SERPL-MCNC: 0.5 MG/DL (ref 0.2–1)
BUN SERPL-MCNC: 19 MG/DL (ref 6–20)
BUN/CREAT SERPL: 17 (ref 12–20)
CALCIUM SERPL-MCNC: 9.3 MG/DL (ref 8.5–10.1)
CALCULATED P AXIS, ECG09: 96 DEGREES
CALCULATED R AXIS, ECG10: -60 DEGREES
CALCULATED T AXIS, ECG11: 124 DEGREES
CHLORIDE SERPL-SCNC: 104 MMOL/L (ref 97–108)
CHOLEST SERPL-MCNC: 238 MG/DL
CK SERPL-CCNC: 571 U/L (ref 26–192)
CK SERPL-CCNC: 648 U/L (ref 26–192)
CO2 SERPL-SCNC: 21 MMOL/L (ref 21–32)
COMMENT, HOLDF: NORMAL
CREAT SERPL-MCNC: 1.1 MG/DL (ref 0.55–1.02)
DIAGNOSIS, 93000: NORMAL
DIFFERENTIAL METHOD BLD: ABNORMAL
EOSINOPHIL # BLD: 0 K/UL (ref 0–0.4)
EOSINOPHIL NFR BLD: 0 % (ref 0–7)
ERYTHROCYTE [DISTWIDTH] IN BLOOD BY AUTOMATED COUNT: 13.4 % (ref 11.5–14.5)
GLOBULIN SER CALC-MCNC: 3.7 G/DL (ref 2–4)
GLUCOSE SERPL-MCNC: 147 MG/DL (ref 65–100)
HCT VFR BLD AUTO: 44.7 % (ref 35–47)
HDLC SERPL-MCNC: 63 MG/DL
HDLC SERPL: 3.8 {RATIO} (ref 0–5)
HGB BLD-MCNC: 14.7 G/DL (ref 11.5–16)
IMM GRANULOCYTES # BLD: 0 K/UL (ref 0–0.04)
IMM GRANULOCYTES NFR BLD AUTO: 0 % (ref 0–0.5)
LDLC SERPL CALC-MCNC: 156.2 MG/DL (ref 0–100)
LIPID PROFILE,FLP: ABNORMAL
LYMPHOCYTES # BLD: 1 K/UL (ref 0.8–3.5)
LYMPHOCYTES NFR BLD: 10 % (ref 12–49)
MCH RBC QN AUTO: 30 PG (ref 26–34)
MCHC RBC AUTO-ENTMCNC: 32.9 G/DL (ref 30–36.5)
MCV RBC AUTO: 91.2 FL (ref 80–99)
MONOCYTES # BLD: 0.8 K/UL (ref 0–1)
MONOCYTES NFR BLD: 8 % (ref 5–13)
NEUTS SEG # BLD: 7.4 K/UL (ref 1.8–8)
NEUTS SEG NFR BLD: 81 % (ref 32–75)
NRBC # BLD: 0 K/UL (ref 0–0.01)
NRBC BLD-RTO: 0 PER 100 WBC
P-R INTERVAL, ECG05: 164 MS
PLATELET # BLD AUTO: 229 K/UL (ref 150–400)
PMV BLD AUTO: 9.1 FL (ref 8.9–12.9)
POTASSIUM SERPL-SCNC: 4.6 MMOL/L (ref 3.5–5.1)
PROT SERPL-MCNC: 7.5 G/DL (ref 6.4–8.2)
Q-T INTERVAL, ECG07: 366 MS
QRS DURATION, ECG06: 98 MS
QTC CALCULATION (BEZET), ECG08: 481 MS
RBC # BLD AUTO: 4.9 M/UL (ref 3.8–5.2)
SAMPLES BEING HELD,HOLD: NORMAL
SODIUM SERPL-SCNC: 136 MMOL/L (ref 136–145)
TRIGL SERPL-MCNC: 94 MG/DL (ref ?–150)
VENTRICULAR RATE, ECG03: 104 BPM
VLDLC SERPL CALC-MCNC: 18.8 MG/DL
WBC # BLD AUTO: 9.2 K/UL (ref 3.6–11)

## 2018-12-24 PROCEDURE — 96374 THER/PROPH/DIAG INJ IV PUSH: CPT

## 2018-12-24 PROCEDURE — 74011250636 HC RX REV CODE- 250/636: Performed by: FAMILY MEDICINE

## 2018-12-24 PROCEDURE — 99218 HC RM OBSERVATION: CPT

## 2018-12-24 PROCEDURE — 73030 X-RAY EXAM OF SHOULDER: CPT

## 2018-12-24 PROCEDURE — 70450 CT HEAD/BRAIN W/O DYE: CPT

## 2018-12-24 PROCEDURE — 99285 EMERGENCY DEPT VISIT HI MDM: CPT

## 2018-12-24 PROCEDURE — 80061 LIPID PANEL: CPT

## 2018-12-24 PROCEDURE — 74011250637 HC RX REV CODE- 250/637: Performed by: FAMILY MEDICINE

## 2018-12-24 PROCEDURE — 96361 HYDRATE IV INFUSION ADD-ON: CPT

## 2018-12-24 PROCEDURE — 73521 X-RAY EXAM HIPS BI 2 VIEWS: CPT

## 2018-12-24 PROCEDURE — 73020 X-RAY EXAM OF SHOULDER: CPT

## 2018-12-24 PROCEDURE — 96360 HYDRATION IV INFUSION INIT: CPT

## 2018-12-24 PROCEDURE — 82550 ASSAY OF CK (CPK): CPT

## 2018-12-24 PROCEDURE — 36415 COLL VENOUS BLD VENIPUNCTURE: CPT

## 2018-12-24 PROCEDURE — 93005 ELECTROCARDIOGRAM TRACING: CPT

## 2018-12-24 PROCEDURE — 74011250636 HC RX REV CODE- 250/636: Performed by: STUDENT IN AN ORGANIZED HEALTH CARE EDUCATION/TRAINING PROGRAM

## 2018-12-24 PROCEDURE — 72125 CT NECK SPINE W/O DYE: CPT

## 2018-12-24 PROCEDURE — 80053 COMPREHEN METABOLIC PANEL: CPT

## 2018-12-24 PROCEDURE — 73060 X-RAY EXAM OF HUMERUS: CPT

## 2018-12-24 PROCEDURE — 75810000301 HC ER LEVEL 1 CLOSED TREATMNT FRACTURE/DISLOCATION

## 2018-12-24 PROCEDURE — 70486 CT MAXILLOFACIAL W/O DYE: CPT

## 2018-12-24 PROCEDURE — 85025 COMPLETE CBC W/AUTO DIFF WBC: CPT

## 2018-12-24 RX ORDER — UREA 10 %
100 LOTION (ML) TOPICAL DAILY
COMMUNITY

## 2018-12-24 RX ORDER — SODIUM CHLORIDE 0.9 % (FLUSH) 0.9 %
5-10 SYRINGE (ML) INJECTION AS NEEDED
Status: DISCONTINUED | OUTPATIENT
Start: 2018-12-24 | End: 2018-12-28 | Stop reason: HOSPADM

## 2018-12-24 RX ORDER — ARIPIPRAZOLE 10 MG/1
10 TABLET ORAL DAILY
Status: DISCONTINUED | OUTPATIENT
Start: 2018-12-25 | End: 2018-12-28 | Stop reason: HOSPADM

## 2018-12-24 RX ORDER — DULOXETIN HYDROCHLORIDE 60 MG/1
120 CAPSULE, DELAYED RELEASE ORAL DAILY
Status: DISCONTINUED | OUTPATIENT
Start: 2018-12-25 | End: 2018-12-28 | Stop reason: HOSPADM

## 2018-12-24 RX ORDER — MORPHINE SULFATE 2 MG/ML
1 INJECTION, SOLUTION INTRAMUSCULAR; INTRAVENOUS ONCE
Status: COMPLETED | OUTPATIENT
Start: 2018-12-24 | End: 2018-12-24

## 2018-12-24 RX ORDER — PRAVASTATIN SODIUM 40 MG/1
40 TABLET ORAL
COMMUNITY
End: 2019-07-18

## 2018-12-24 RX ORDER — PANTOPRAZOLE SODIUM 40 MG/1
40 TABLET, DELAYED RELEASE ORAL
Status: DISCONTINUED | OUTPATIENT
Start: 2018-12-25 | End: 2018-12-28 | Stop reason: HOSPADM

## 2018-12-24 RX ORDER — PROPOFOL 10 MG/ML
10 INJECTION, EMULSION INTRAVENOUS
Status: ACTIVE | OUTPATIENT
Start: 2018-12-24 | End: 2018-12-25

## 2018-12-24 RX ORDER — ACETAMINOPHEN 325 MG/1
650 TABLET ORAL ONCE
Status: DISCONTINUED | OUTPATIENT
Start: 2018-12-24 | End: 2018-12-24

## 2018-12-24 RX ORDER — THERA TABS 400 MCG
1 TAB ORAL DAILY
Status: DISCONTINUED | OUTPATIENT
Start: 2018-12-25 | End: 2018-12-28 | Stop reason: HOSPADM

## 2018-12-24 RX ORDER — ACETAMINOPHEN 325 MG/1
650 TABLET ORAL
Status: DISCONTINUED | OUTPATIENT
Start: 2018-12-24 | End: 2018-12-28 | Stop reason: HOSPADM

## 2018-12-24 RX ORDER — SODIUM CHLORIDE 0.9 % (FLUSH) 0.9 %
5-10 SYRINGE (ML) INJECTION EVERY 8 HOURS
Status: DISCONTINUED | OUTPATIENT
Start: 2018-12-24 | End: 2018-12-28 | Stop reason: HOSPADM

## 2018-12-24 RX ORDER — PRAVASTATIN SODIUM 20 MG/1
40 TABLET ORAL
Status: DISCONTINUED | OUTPATIENT
Start: 2018-12-24 | End: 2018-12-28 | Stop reason: HOSPADM

## 2018-12-24 RX ORDER — SODIUM CHLORIDE 9 MG/ML
75 INJECTION, SOLUTION INTRAVENOUS CONTINUOUS
Status: DISCONTINUED | OUTPATIENT
Start: 2018-12-24 | End: 2018-12-25

## 2018-12-24 RX ORDER — CARVEDILOL 12.5 MG/1
12.5 TABLET ORAL 2 TIMES DAILY WITH MEALS
Status: DISCONTINUED | OUTPATIENT
Start: 2018-12-24 | End: 2018-12-28 | Stop reason: HOSPADM

## 2018-12-24 RX ORDER — HYDRALAZINE HYDROCHLORIDE 25 MG/1
25 TABLET, FILM COATED ORAL EVERY 12 HOURS
Status: DISCONTINUED | OUTPATIENT
Start: 2018-12-24 | End: 2018-12-28 | Stop reason: HOSPADM

## 2018-12-24 RX ORDER — AMOXICILLIN 250 MG
1 CAPSULE ORAL
COMMUNITY
End: 2021-12-30

## 2018-12-24 RX ORDER — PRIMIDONE 50 MG/1
100 TABLET ORAL 2 TIMES DAILY
Status: DISCONTINUED | OUTPATIENT
Start: 2018-12-24 | End: 2018-12-28 | Stop reason: HOSPADM

## 2018-12-24 RX ADMIN — CARVEDILOL 12.5 MG: 12.5 TABLET, FILM COATED ORAL at 19:43

## 2018-12-24 RX ADMIN — SODIUM CHLORIDE 75 ML/HR: 900 INJECTION, SOLUTION INTRAVENOUS at 19:43

## 2018-12-24 RX ADMIN — MORPHINE SULFATE 1 MG: 2 INJECTION, SOLUTION INTRAMUSCULAR; INTRAVENOUS at 20:13

## 2018-12-24 RX ADMIN — PRAVASTATIN SODIUM 40 MG: 20 TABLET ORAL at 21:58

## 2018-12-24 RX ADMIN — PRIMIDONE 100 MG: 50 TABLET ORAL at 21:58

## 2018-12-24 RX ADMIN — HYDRALAZINE HYDROCHLORIDE 25 MG: 25 TABLET, FILM COATED ORAL at 19:43

## 2018-12-24 RX ADMIN — SODIUM CHLORIDE 1000 ML: 900 INJECTION, SOLUTION INTRAVENOUS at 14:39

## 2018-12-24 NOTE — ED PROVIDER NOTES
69 y.o. female with past medical history significant for HTN, diabetes, CAD, hypercholesteremia, a-fib, CKD, depression, anxiety, MI, and PUD who presents from EMS with chief complaint of GFL. Last night the patient was on her way to the bathroom when she had a mechanical fall. She hit her head on the ground and her left arm. She denies any LOC and now complains of pain to the left arm. EMS reports that there is deformity to the left humerus and they gave the patient fentanyl en route. The patient takes 81 mg aspirin daily along with medication for HTN and tremors, she did not yet take her medications today. She lives alone at [facility]. She notes that she has partially reduced vision in her right eye that has been present since before the fall and chronic back pain which is not any worse after the fall. She denies any chest, abdominal, or hip pain. There are no other acute medical concerns at this time. Social hx: Former smoker. Rare EtOH. Denies illicit drugs. PCP: Joey Hercules DO    Note written by Brenda Feliz, as dictated by Vishal Bynum MD 12:40 PM        The history is provided by the patient and the EMS personnel. No  was used.         Past Medical History:   Diagnosis Date    Anxiety disorder     Atrial fibrillation (HCC)     CAD (coronary artery disease) 2007    stents, CABG x 3v    Chronic kidney disease     Depression     Diabetes ([facility] [address]     High cholesterol     History of peptic ulcer     Bleeding ulcer with increased NSAID use    Hypertension     MI, old 2007    Valvular heart disease        Past Surgical History:   Procedure Laterality Date    HX CORONARY ARTERY BYPASS GRAFT      HX ORTHOPAEDIC Right     HX TONSILLECTOMY           Family History:   Problem Relation Age of Onset    Heart Attack Mother 72        Dec 79yo    Hypertension Mother     Other Father         Unknown    Parkinson's Disease Brother        Social History     Socioeconomic History    Marital status: SINGLE     Spouse name: Not on file    Number of children: Not on file    Years of education: Not on file    Highest education level: Not on file   Social Needs    Financial resource strain: Not on file    Food insecurity - worry: Not on file    Food insecurity - inability: Not on file    Transportation needs - medical: Not on file   [place] needs - non-medical: Not on file   Occupational History    Occupation: Retired realestate/teacher   Tobacco Use    Smoking status: Former Smoker     Types: Cigarettes    Smokeless tobacco: Never Used   Substance and Sexual Activity    Alcohol use: Yes     Alcohol/week: 0.0 oz     Comment: Rare    Drug use: No    Sexual activity: Not on file   Other Topics Concern    Not on file   Social History Narrative    Lives in [facility] alone         ALLERGIES: Amiodarone    Review of Systems   Constitutional: Negative for fever. Respiratory: Negative for cough, shortness of breath and wheezing. Cardiovascular: Negative for chest pain and leg swelling. Gastrointestinal: Negative for abdominal pain, diarrhea, nausea and vomiting. Genitourinary: Negative for dysuria. Musculoskeletal: Positive for arthralgias and myalgias. Negative for neck stiffness. Skin: Negative for rash. Neurological: Negative. Negative for syncope and headaches. Psychiatric/Behavioral: Negative for confusion. All other systems reviewed and are negative. There were no vitals filed for this visit. Physical Exam   Constitutional: She appears well-developed and well-nourished. HENT:   Head: Normocephalic and atraumatic. Mouth/Throat: Oropharynx is clear and moist.   Swelling to right camilo-oribital area and right maxilla. Eyes: EOM are normal. Pupils are equal, round, and reactive to light. No hyphema   Neck: Normal range of motion. Neck supple.    Cardiovascular: Normal rate, regular rhythm, normal heart sounds and intact distal pulses. Exam reveals no gallop and no friction rub. No murmur heard. Pulmonary/Chest: Effort normal. No respiratory distress. She has no wheezes. She has no rales. Sternotomy scar   Abdominal: Soft. There is no tenderness. There is no rebound. Musculoskeletal: Normal range of motion. She exhibits no tenderness. Deformity and tenderness to left shoulder and proximal humerus   Neurological: She is alert. No cranial nerve deficit. Motor; symmetric   Skin: No erythema. Psychiatric: She has a normal mood and affect. Her behavior is normal.   Nursing note and vitals reviewed. Note written by Brenda Rudolph, as dictated by Hoyt Romberg, MD 12:40 PM    MDM       DISLOCATION-UPPER EXT (ASAP ONLY)  Date/Time: 12/24/2018 1:15 PM  Performed by: Hoyt Romberg, MD  Authorized by: Hoyt Romberg, MD     Consent:     Consent obtained:  Verbal    Consent given by:  Patient    Risks discussed:  Recurrent dislocation  Location:     Location:  Shoulder    Shoulder location:  L shoulder    Shoulder dislocation type: anterior      Chronicity:  New  Pre-procedure assessment:     Pre-procedure imaging:  X-ray    Imaging findings: dislocation present      Distal perfusion: normal    Procedure details:     Reduction successful: yes      Reduction confirmed with imaging: yes      Immobilization:  Sling  Post-procedure assessment:     Neurological function: normal      Distal perfusion: normal      Range of motion: improved      Patient tolerance of procedure:   Tolerated well, no immediate complications  Comments:      Procedure finished 130 pm;

## 2018-12-24 NOTE — PROGRESS NOTES
Date of previous inpatient admission/ ED visit? 2/9/15-2/19/15 Inpatient Admission    What brought the patient back to ED? Patient presents to the ED s/p fall    Did patient decline recommended services during last admission/ ED visit (if yes, what)? No    Has patient seen a provider since their last inpatient admission/ED visit (if yes, when)? Yes. PCP is Ganesh Magaña last seen in office 5 months ago next visit 5/2019    CM Interventions:  From previous inpatient admission/ED visit: Assessment  From current inpatient admission/ED visit: Assessment    SSED/CM consult received and appreciated. EMR reviewed. History significant for HTN, diabetes, CAD, hypercholesteremia, a-fib, CKD, depression, anxiety, MI, and PUD who presents from EMS with chief complaint of GFL. Met w/patient and introduced to role of CM.  lives alone at the [facility] (since 2013). Patient DME includes cane/ RW/ and commode over toilet. Support received from [employer] [phone]. Per patient has 4 nieces however nieces aren't talking to each other. Transportation to MD appointments provided by [facility] [employer]. Ms. Remberto Mondragon states \" I hitch a ride when I need to. \"    Noted [facility] Medicare Complete as insurance provider. Patient acknowledges having [employer] and 1/1/19 will have [employer]. Previous providers include [facility]. Patient informed CM of [address] [place] [address] Location 3 months ago and [facility] provided HH/PT - CM acknowledged agency provides personal care.  states insurance paid for PT from agency. Noted in MR - Samantha 2014 and Tommy 2015. CM asked Ms. Ramos about plan if released from the hospital states will be fine. Case discussed w/  and Nursing. CM communicated w/ Registrar to obtain patient updates (insurance and emergency contact). No AMD noted in [facility]Renny Case Management will continue to follow.       Care Management Interventions  PCP Verified by CM:  Yes  Last Visit to PCP: 07/24/18  Palliative Care Criteria Met (RRAT>21 & CHF Dx)?: No  Transition of Care Consult (CM Consult): Discharge Planning  MyChart Signup: No  Discharge Durable Medical Equipment: No  Health Maintenance Reviewed: Yes  Physical Therapy Consult: Yes  Occupational Therapy Consult: No  Speech Therapy Consult: No  Current Support Network: Lives Alone  Confirm Follow Up Transport: (TBD)  Plan discussed with Pt/Family/Caregiver: Yes   Resource Information Provided?: No  Discharge Location  Discharge Placement: (TBD)

## 2018-12-24 NOTE — ED NOTES
Change of shift. Care of patient taken over from Dr. Ludin Gutierrez; H&P reviewed, bedside handoff complete. Awaiting shoulder XR. Note written by Brenda Sanon, as dictated by Jose Luis Jean MD 2:00 PM        CONSULT NOTE:  3:24 PM Jose Luis Jean MD Tiger Texted Dr. Anmol Perez, Consult for Hospitalist.  Discussed available diagnostic tests and clinical findings.   Dr. Anmol Perez will admit Pt

## 2018-12-24 NOTE — ED TRIAGE NOTES
[facility] with complaints of GLF that occured last night. Patient denies LOC, but did hit R head. EMS reports obvious deformity to L Humerus.   Patient received 100 mcg of Fentanyl en route and BS was 194

## 2018-12-24 NOTE — PROGRESS NOTES
Admission Medication Reconciliation:    Information obtained from:  Medication bottle/ RxQuery    Comments/Recommendations: Updated PTA meds/reviewed patient's allergies. Patient seemed to be a good historian and was alert during interview. She brought her prescription bottles along with her. Medication changes (since last review): Added  -AERDS  -Vit B 12    Adjusted  -Valium doses (was TID PRN, now takes TID)  -Hydroxzine (was TID PRN, now takes TID)  -Zolpidem (was QHS PRN, now QHS)  -Vit D3 dose    Removed  -Super B complex      2) updated Drug allergy:  Removed Amiodarone  as patient tolerated IV amiodarone       Allergies:  Amiodarone    Significant PMH/Disease States:   Past Medical History:   Diagnosis Date    Anxiety disorder     Atrial fibrillation (HCC)     CAD (coronary artery disease) 2007    stents, CABG x 3v    Chronic kidney disease     Depression     Diabetes ([facility] [address]     High cholesterol     History of peptic ulcer     Bleeding ulcer with increased NSAID use    Hypertension     MI, old 2007    Valvular heart disease        Chief Complaint for this Admission:    Chief Complaint   Patient presents with   [facility] Fall       Prior to Admission Medications:   Prior to Admission Medications   Prescriptions Last Dose Informant Patient Reported? Taking? ARIPiprazole (ABILIFY) 10 mg tablet   Yes Yes   Sig: Take 10 mg by mouth daily. Cholecalciferol, Vitamin D3, 1,000 unit cap   Yes Yes   Sig: Take 1,000 Units by mouth daily. DULoxetine (CYMBALTA) 60 mg capsule   Yes Yes   Sig: Take 120 mg by mouth daily. HYDRALAZINE HCL (APRESOLINE PO)   Yes Yes   Sig: Take 25 mg by mouth two (2) times a day. LACTOBACILLUS RHAMNOSUS GG (CULTURELLE PO)   Yes Yes   Sig: Take 1 Tab by mouth daily. VIT A/VIT C/VIT E/ZINC/COPPER (PRESERVISION AREDS PO)   Yes Yes   Sig: Take  by mouth. aspirin delayed-release 81 mg tablet   Yes Yes   Sig: Take 81 mg by mouth every evening.    carvedilol (COREG) 12.5 mg tablet   No Yes   Sig: Take 1 Tab by mouth two (2) times daily (with meals). cyanocobalamin (VITAMIN B12) 100 mcg tablet   Yes Yes   Sig: Take 100 mcg by mouth daily. diazePAM (VALIUM) 2 mg tablet   Yes Yes   Sig: Take 2 mg by mouth every eight (8) hours. hydrOXYzine HCl (ATARAX) 50 mg tablet   Yes Yes   Sig: Take 50 mg by mouth three (3) times daily. methocarbamol (ROBAXIN) 750 mg tablet   Yes Yes   Sig: Take 500 mg by mouth three (3) times daily. multivitamins-minerals-lutein (CENTRUM SILVER) tab tablet   Yes Yes   Sig: Take 1 Tab by mouth daily. pantoprazole (PROTONIX) 40 mg tablet   Yes Yes   Sig: Take 40 mg by mouth Daily (before breakfast). pravastatin (PRAVACHOL) 40 mg tablet   Yes Yes   Sig: Take 40 mg by mouth nightly. primidone (MYSOLINE) 50 mg tablet   No Yes   Sig: Take 2 Tabs by mouth two (2) times a day. senna-docusate (SENNA WITH DOCUSATE SODIUM) 8.6-50 mg per tablet   Yes Yes   Sig: Take 1 Tab by mouth daily as needed for Constipation. vit C,D-Cj-clnqb-lutein-zeaxan (PRESERVISION AREDS-2) 176-265-91-1 mg-unit-mg-mg cap capsule   Yes Yes   Sig: Take 1 Cap by mouth two (2) times a day. zolpidem (AMBIEN) 10 mg tablet   Yes Yes   Sig: Take 10 mg by mouth nightly.       Facility-Administered Medications: None

## 2018-12-24 NOTE — PROGRESS NOTES
Admission Medication Reconciliation:    Information obtained from:  Medication bottle/ RxQuery    Comments/Recommendations: Updated PTA meds/reviewed patient's allergies. Patient seemed to be a good historian and was alert during interview. She brought her prescription bottles along with her. Medication changes (since last review): Added  -AERDS  -Vit B 12    Adjusted  -Valium doses (was TID PRN, now takes TID)  -Hydroxzine (was TID PRN, now takes TID)  -Zolpidem (was QHS PRN, now QHS)  -Vit D3 dose    Removed  -Super B complex         Allergies:  Amiodarone    Significant PMH/Disease States:   Past Medical History:   Diagnosis Date    Anxiety disorder     Atrial fibrillation (HCC)     CAD (coronary artery disease) 2007    stents, CABG x 3v    Chronic kidney disease     Depression     Diabetes ([address]     High cholesterol     History of peptic ulcer     Bleeding ulcer with increased NSAID use    Hypertension     MI, old 2007    Valvular heart disease        Chief Complaint for this Admission:    Chief Complaint   Patient presents with   [facility] Fall       Prior to Admission Medications:   Prior to Admission Medications   Prescriptions Last Dose Informant Patient Reported? Taking? ARIPiprazole (ABILIFY) 10 mg tablet   Yes Yes   Sig: Take 10 mg by mouth daily. Cholecalciferol, Vitamin D3, 1,000 unit cap   Yes Yes   Sig: Take 1,000 Units by mouth daily. DULoxetine (CYMBALTA) 60 mg capsule   Yes Yes   Sig: Take 120 mg by mouth daily. HYDRALAZINE HCL (APRESOLINE PO)   Yes Yes   Sig: Take 25 mg by mouth two (2) times a day. LACTOBACILLUS RHAMNOSUS GG (CULTURELLE PO)   Yes Yes   Sig: Take 1 Tab by mouth daily. VIT A/VIT C/VIT E/ZINC/COPPER (PRESERVISION AREDS PO)   Yes Yes   Sig: Take  by mouth. aspirin delayed-release 81 mg tablet   Yes Yes   Sig: Take 81 mg by mouth every evening. carvedilol (COREG) 12.5 mg tablet   No Yes   Sig: Take 1 Tab by mouth two (2) times daily (with meals). cyanocobalamin (VITAMIN B12) 100 mcg tablet   Yes Yes   Sig: Take 100 mcg by mouth daily. diazePAM (VALIUM) 2 mg tablet   Yes Yes   Sig: Take 2 mg by mouth every eight (8) hours. hydrOXYzine HCl (ATARAX) 50 mg tablet   Yes Yes   Sig: Take 50 mg by mouth three (3) times daily. methocarbamol (ROBAXIN) 750 mg tablet   Yes Yes   Sig: Take 500 mg by mouth three (3) times daily. multivitamins-minerals-lutein (CENTRUM SILVER) tab tablet   Yes Yes   Sig: Take 1 Tab by mouth daily. pantoprazole (PROTONIX) 40 mg tablet   Yes Yes   Sig: Take 40 mg by mouth Daily (before breakfast). pravastatin (PRAVACHOL) 40 mg tablet   Yes Yes   Sig: Take 40 mg by mouth nightly. primidone (MYSOLINE) 50 mg tablet   No Yes   Sig: Take 2 Tabs by mouth two (2) times a day. senna-docusate (SENNA WITH DOCUSATE SODIUM) 8.6-50 mg per tablet   Yes Yes   Sig: Take 1 Tab by mouth daily as needed for Constipation. vit C,B-Dl-mnvzm-lutein-zeaxan (PRESERVISION AREDS-2) 018-398-04-1 mg-unit-mg-mg cap capsule   Yes Yes   Sig: Take 1 Cap by mouth two (2) times a day. zolpidem (AMBIEN) 10 mg tablet   Yes Yes   Sig: Take 10 mg by mouth nightly.       Facility-Administered Medications: None

## 2018-12-24 NOTE — ROUTINE PROCESS
TRANSFER - OUT REPORT:    Verbal report given to LAMONT Rivas(name) on [employer]  being transferred to (unit) for routine progression of care       Report consisted of patients Situation, Background, Assessment and   Recommendations(SBAR). Information from the following report(s) SBAR, ED Summary, [facility] and Recent Results was reviewed with the receiving nurse. Lines:   Peripheral IV 12/24/18 Right Wrist (Active)   Site Assessment Clean, dry, & intact 12/24/2018 12:50 PM   Phlebitis Assessment 0 12/24/2018 12:50 PM   Infiltration Assessment 0 12/24/2018 12:50 PM   Dressing Status Clean, dry, & intact 12/24/2018 12:50 PM        Opportunity for questions and clarification was provided.       Patient transported with:   Transportation

## 2018-12-24 NOTE — ED NOTES
Hernan Robertson RN and Vincenzo Narayanan RN attempted to ambulate pt. Pt noted to be shaky, unsteady, and unable to balance independently upon standing at bedside. Pt returned to bed safely and Dr. Patricia Denise notified.

## 2018-12-24 NOTE — ED NOTES
Bedside and Verbal shift change report given to Capri Beavers RN (oncoming nurse) by Shayna Reyez RN (offgoing nurse). Report included the following information SBAR, ED Summary, MAR and Recent Results.

## 2018-12-25 LAB
ANION GAP SERPL CALC-SCNC: 7 MMOL/L (ref 5–15)
BASOPHILS # BLD: 0 K/UL (ref 0–0.1)
BASOPHILS NFR BLD: 0 % (ref 0–1)
BUN SERPL-MCNC: 21 MG/DL (ref 6–20)
BUN/CREAT SERPL: 18 (ref 12–20)
CALCIUM SERPL-MCNC: 8.2 MG/DL (ref 8.5–10.1)
CHLORIDE SERPL-SCNC: 108 MMOL/L (ref 97–108)
CK SERPL-CCNC: 410 U/L (ref 26–192)
CO2 SERPL-SCNC: 24 MMOL/L (ref 21–32)
CREAT SERPL-MCNC: 1.16 MG/DL (ref 0.55–1.02)
DIFFERENTIAL METHOD BLD: ABNORMAL
EOSINOPHIL # BLD: 0.1 K/UL (ref 0–0.4)
EOSINOPHIL NFR BLD: 1 % (ref 0–7)
ERYTHROCYTE [DISTWIDTH] IN BLOOD BY AUTOMATED COUNT: 13.2 % (ref 11.5–14.5)
GLUCOSE SERPL-MCNC: 131 MG/DL (ref 65–100)
HCT VFR BLD AUTO: 35.9 % (ref 35–47)
HGB BLD-MCNC: 11.9 G/DL (ref 11.5–16)
IMM GRANULOCYTES # BLD: 0 K/UL (ref 0–0.04)
IMM GRANULOCYTES NFR BLD AUTO: 0 % (ref 0–0.5)
LYMPHOCYTES # BLD: 1.8 K/UL (ref 0.8–3.5)
LYMPHOCYTES NFR BLD: 26 % (ref 12–49)
MCH RBC QN AUTO: 30 PG (ref 26–34)
MCHC RBC AUTO-ENTMCNC: 33.1 G/DL (ref 30–36.5)
MCV RBC AUTO: 90.4 FL (ref 80–99)
MONOCYTES # BLD: 1 K/UL (ref 0–1)
MONOCYTES NFR BLD: 14 % (ref 5–13)
NEUTS SEG # BLD: 4.1 K/UL (ref 1.8–8)
NEUTS SEG NFR BLD: 59 % (ref 32–75)
NRBC # BLD: 0 K/UL (ref 0–0.01)
NRBC BLD-RTO: 0 PER 100 WBC
PLATELET # BLD AUTO: 199 K/UL (ref 150–400)
PMV BLD AUTO: 9.2 FL (ref 8.9–12.9)
POTASSIUM SERPL-SCNC: 3.7 MMOL/L (ref 3.5–5.1)
RBC # BLD AUTO: 3.97 M/UL (ref 3.8–5.2)
SODIUM SERPL-SCNC: 139 MMOL/L (ref 136–145)
WBC # BLD AUTO: 7 K/UL (ref 3.6–11)

## 2018-12-25 PROCEDURE — 85025 COMPLETE CBC W/AUTO DIFF WBC: CPT

## 2018-12-25 PROCEDURE — 74011250636 HC RX REV CODE- 250/636: Performed by: FAMILY MEDICINE

## 2018-12-25 PROCEDURE — 96361 HYDRATE IV INFUSION ADD-ON: CPT

## 2018-12-25 PROCEDURE — 36415 COLL VENOUS BLD VENIPUNCTURE: CPT

## 2018-12-25 PROCEDURE — 80048 BASIC METABOLIC PNL TOTAL CA: CPT

## 2018-12-25 PROCEDURE — 74011250637 HC RX REV CODE- 250/637: Performed by: INTERNAL MEDICINE

## 2018-12-25 PROCEDURE — 82550 ASSAY OF CK (CPK): CPT

## 2018-12-25 PROCEDURE — 74011250637 HC RX REV CODE- 250/637: Performed by: HOSPITALIST

## 2018-12-25 PROCEDURE — 99218 HC RM OBSERVATION: CPT

## 2018-12-25 PROCEDURE — 74011250637 HC RX REV CODE- 250/637: Performed by: FAMILY MEDICINE

## 2018-12-25 RX ORDER — OXYCODONE AND ACETAMINOPHEN 5; 325 MG/1; MG/1
1 TABLET ORAL ONCE
Status: COMPLETED | OUTPATIENT
Start: 2018-12-25 | End: 2018-12-25

## 2018-12-25 RX ORDER — OXYCODONE HYDROCHLORIDE 5 MG/1
5 TABLET ORAL
Status: DISCONTINUED | OUTPATIENT
Start: 2018-12-25 | End: 2018-12-28 | Stop reason: HOSPADM

## 2018-12-25 RX ADMIN — ARIPIPRAZOLE 10 MG: 10 TABLET ORAL at 09:47

## 2018-12-25 RX ADMIN — DULOXETINE 120 MG: 60 CAPSULE, DELAYED RELEASE ORAL at 09:47

## 2018-12-25 RX ADMIN — SODIUM CHLORIDE 75 ML/HR: 900 INJECTION, SOLUTION INTRAVENOUS at 09:47

## 2018-12-25 RX ADMIN — PRAVASTATIN SODIUM 40 MG: 20 TABLET ORAL at 21:24

## 2018-12-25 RX ADMIN — PRIMIDONE 100 MG: 50 TABLET ORAL at 09:47

## 2018-12-25 RX ADMIN — HYDRALAZINE HYDROCHLORIDE 25 MG: 25 TABLET, FILM COATED ORAL at 21:24

## 2018-12-25 RX ADMIN — HYDRALAZINE HYDROCHLORIDE 25 MG: 25 TABLET, FILM COATED ORAL at 08:27

## 2018-12-25 RX ADMIN — OXYCODONE HYDROCHLORIDE 5 MG: 5 TABLET ORAL at 14:17

## 2018-12-25 RX ADMIN — CARVEDILOL 12.5 MG: 12.5 TABLET, FILM COATED ORAL at 17:25

## 2018-12-25 RX ADMIN — OXYCODONE HYDROCHLORIDE 5 MG: 5 TABLET ORAL at 21:23

## 2018-12-25 RX ADMIN — OXYCODONE AND ACETAMINOPHEN 1 TABLET: 5; 325 TABLET ORAL at 03:00

## 2018-12-25 RX ADMIN — CARVEDILOL 12.5 MG: 12.5 TABLET, FILM COATED ORAL at 08:27

## 2018-12-25 RX ADMIN — PRIMIDONE 100 MG: 50 TABLET ORAL at 21:24

## 2018-12-25 RX ADMIN — PANTOPRAZOLE SODIUM 40 MG: 40 TABLET, DELAYED RELEASE ORAL at 07:40

## 2018-12-25 RX ADMIN — THERA TABS 1 TABLET: TAB at 09:47

## 2018-12-25 NOTE — PROGRESS NOTES
met with patient as she had questions and her nurse Alyssa Shen needed reinforcement from . I met with patient and introduced myself and lended an ear. Patient is retired from teaching and selling real estate. She resides at [facility] and has been there since 2013. Patient had a total knee replacement in April of 2018 at [facility]. She has a walker at home and uses [facility] for  Her medications and does not have an AMD.  She declined further information. Patient was made aware of the need for PT and Ot to assess her in am and then she is hopeful to be discharged home. She also had some concerns about [employer] and coverage. I addressed this issue as best as I could and encouraged her to follow up with any specific questions. She asked that I call them today and I explained that their line was closed as it was a  [place]. She is not sure she wishes to have any home health post hospital.  Patient will need a ride home most likely and could benefit from using [facility].  will follow in am for needs and patient will need paper scrubs to be discharged home in as she did not bring any clothes to hospital.  SBAR given to CRM's.

## 2018-12-25 NOTE — ROUTINE PROCESS
Bedside shift change report given to Merline Banner ,RN (oncoming nurse) by Yumi Still RN(offgoing nurse). Report given with SBAR.

## 2018-12-25 NOTE — ROUTINE PROCESS
12: Paged hospitalist.     6869: Dr. Roni Oconnor notified pt experiencing pain level 8/10 and /79 and HR 74. Verbal order for Percocet 5/325 mg one time dose and stated BP may be elevated d/t pain. 1099:   12/25/18 0739   Vital Signs   Temp 98.5 °F (36.9 °C)   Pulse (Heart Rate) 61   /73   MAP (Calculated) 112      Paged hospitalist.     3941: Dr. Best Ulloa notified pt BP elevated. Verbal order to give morning BP meds now.

## 2018-12-25 NOTE — PROGRESS NOTES
CRM received a call from [employer]. She stated the the doctor planned to discharge the patient today after PT evaluation and that the patient will need a ride home. CRM will need to determine a safe ride plan based on the PT eval, however PT may not be here today. CRM unable to reach them. The patient did ambulate to the bathroom with assist from nursing. DANAE called Андрей Del Toro back and left message with unit secretary to call CRM back for further discussion. The discharge order is still pending. CRM would recommend an OT consult for this patient (shoulder dislocation). CRM called PT office 21 [phone] several times #  provided by the  and there was no answer. The patient lives alone. KJT    11:11pm: CRM spoke with Андрей Del Toro RN. She has spoken with Dr. Phill Moore and the plan is for PT and OT tomorrow and possible discharge.  KJT

## 2018-12-25 NOTE — PROGRESS NOTES
Bedside and Verbal shift change report given to Amira Marion RN (oncoming nurse) by Shahla Moore RN (offgoing nurse). Report included the following information SBAR.

## 2018-12-25 NOTE — PROGRESS NOTES
Bedside shift change report given to Sanket Escobar (oncoming nurse) by Jasen Hoffmann (offgoing nurse). Report included the following information SBAR.

## 2018-12-25 NOTE — PROGRESS NOTES
Hospitalist Progress Note  Sola Nova MD  Answering service: [phone] from in house phone  Cell: [phone]      Date of Service:  2018  NAME:  Karina Carney  :  1940  MRN:  [id number]      Admission Summary:     78-year-old female with past medical history of anemia, CABG, syncope, bradycardia, hypotension, hyperlipidemia, depression who presents to the hospital after a fall. Patient reports that she got up from her bed to go to the bathroom and had a sudden fall. The patient reports that she did not trip on anything but just \"fell. \"  Patient denies any pre or post-fall loss of consciousness or any other symptomology. Patient developed a right facial hematoma and was brought to the hospital for further management and evaluation. The patient reports that she did not have any loss of consciousness, did not have any seizure or seizure-like activity, did not have any chest pain, shortness of breath or any other symptoms. The patient also complains of some left shoulder pain, was found to have dislocation of the left shoulder and was requested to be admitted under the hospitalist service. Per the ER physician, the patient failed the ambulatory trial and also was unsteady on her feet. Interval history / Subjective:     Patient feeling better this AM. Some shoulder discomfort and intermittent high BP readings. Nursing tried to ambulate her and she was unsteady. Awaiting PT eval. Per nursing and CM there is no PT here today, so patient cannot be evaluated. This is inappropriate and a delay in care and discharge planning. Assessment & Plan:     Right facial injury with left shoulder anterior dislocation. Fortunately, the patient did not suffer any bony facial injuries.     -continue to observe   -PT pending    Fall, most likely mechanical fall.  -patient still unsteady  -PT pending    Elevated CK, this is not rhabdo. Due to fall. History of hypertension: Continue home meds and adjust as needed. -spikes likely due to pain    Dehydration, resolved. Stop fluids. Code status: Full  DVT prophylaxis: SCDs    Care Plan discussed with: Patient/Family and Nurse  Disposition: TBD. Awaiting PT eval. Per nursing and CM there is no PT here today, so patient cannot be evaluated. This is inappropriate and a delay in care and discharge planning. Hospital Problems  Date Reviewed: 12/24/2018          Codes Class Noted POA    * (Principal) Fall ICD-10-CM: W19. Malgorzatawenclisa Jointer  ICD-9-CM: P981.5  12/24/2018 Unknown              Review of Systems:   A comprehensive review of systems was negative except for that written in the HPI. Vital Signs:    Last 24hrs VS reviewed since prior progress note. Most recent are:  Visit Vitals  /70   Pulse 60   Temp 98 °F (36.7 °C)   Resp 16   SpO2 99%         Intake/Output Summary (Last 24 hours) at 12/25/2018 1114  Last data filed at 12/24/2018 2154  Gross per 24 hour   Intake    Output 200 ml   Net -200 ml        Physical Examination:        Constitutional:  No acute distress, cooperative, pleasant    ENT:  Neck supple   Resp:  CTA bilaterally. No accessory muscle use   CV:  Regular rhythm, normal rate, no murmur    GI:  Soft, non distended, non tender, normoactive bowel sounds    Musculoskeletal:  No edema, warm  Skin: Extensive facial ecchymoses     Neurologic:  Moves all extremities. AAOx3.  Arm in sling     Data Review:    Review and/or order of clinical lab test  Review and/or order of tests in the radiology section of CPT  Review and/or order of tests in the medicine section of CPT    Labs:     Recent Labs     12/25/18 0228 12/24/18  1254   WBC 7.0 9.2   HGB 11.9 14.7   HCT 35.9 44.7    229     Recent Labs     12/25/18 0228 12/24/18  1254    136   K 3.7 4.6    104   CO2 24 21   BUN 21* 19   CREA 1.16* 1.10*   * 147*   CA 8.2* 9.3     Recent Labs 12/24/18  1254   SGOT 41*   ALT 30   *   TBILI 0.5   TP 7.5   ALB 3.8   GLOB 3.7     No results for input(s): INR, PTP, APTT in the last 72 hours. No lab exists for component: INREXT   No results for input(s): FE, TIBC, PSAT, FERR in the last 72 hours. No results found for: FOL, RBCF   No results for input(s): PH, PCO2, PO2 in the last 72 hours.   Recent Labs     12/25/18  0228 12/24/18  1722 12/24/18  1254   * 571* 648*     Lab Results   Component Value Date/Time    Cholesterol, total 238 (H) 12/24/2018 12:54 PM    HDL Cholesterol 63 12/24/2018 12:54 PM    LDL, calculated 156.2 (H) 12/24/2018 12:54 PM    Triglyceride 94 12/24/2018 12:54 PM    CHOL/HDL Ratio 3.8 12/24/2018 12:54 PM     Lab Results   Component Value Date/Time    Glucose (POC) 135 (H) 02/17/2015 06:46 AM    Glucose (POC) 129 (H) 02/16/2015 09:58 PM    Glucose (POC) 135 (H) 02/16/2015 05:13 PM    Glucose (POC) 159 (H) 02/16/2015 12:25 PM    Glucose (POC) 145 (H) 02/16/2015 06:40 AM     Lab Results   Component Value Date/Time    Color YELLOW/STRAW 02/12/2015 05:20 PM    Appearance CLEAR 02/12/2015 05:20 PM    Specific gravity 1.013 02/12/2015 05:20 PM    pH (UA) 6.5 02/12/2015 05:20 PM    Protein NEGATIVE  02/12/2015 05:20 PM    Glucose NEGATIVE  02/12/2015 05:20 PM    Ketone NEGATIVE  02/12/2015 05:20 PM    Bilirubin NEGATIVE  02/12/2015 05:20 PM    Urobilinogen 0.2 02/12/2015 05:20 PM    Nitrites NEGATIVE  02/12/2015 05:20 PM    Leukocyte Esterase NEGATIVE  02/12/2015 05:20 PM    Epithelial cells FEW 02/12/2015 05:20 PM    Bacteria NEGATIVE  02/12/2015 05:20 PM    WBC 0-4 02/12/2015 05:20 PM    RBC 0-5 02/12/2015 05:20 PM         Medications Reviewed:     Current Facility-Administered Medications   Medication Dose Route Frequency    oxyCODONE IR (ROXICODONE) tablet 5 mg  5 mg Oral Q6H PRN    ARIPiprazole (ABILIFY) tablet 10 mg  10 mg Oral DAILY    carvedilol (COREG) tablet 12.5 mg  12.5 mg Oral BID WITH MEALS    DULoxetine (CYMBALTA) capsule 120 mg  120 mg Oral DAILY    hydrALAZINE (APRESOLINE) tablet 25 mg  25 mg Oral Q12H    therapeutic multivitamin (THERAGRAN) tablet 1 Tab  1 Tab Oral DAILY    pantoprazole (PROTONIX) tablet 40 mg  40 mg Oral ACB    pravastatin (PRAVACHOL) tablet 40 mg  40 mg Oral QHS    primidone (MYSOLINE) tablet 100 mg  100 mg Oral BID    sodium chloride (NS) flush 5-10 mL  5-10 mL IntraVENous Q8H    sodium chloride (NS) flush 5-10 mL  5-10 mL IntraVENous PRN    0.9% sodium chloride infusion  75 mL/hr IntraVENous CONTINUOUS    acetaminophen (TYLENOL) tablet 650 mg  650 mg Oral Q4H PRN     ______________________________________________________________________  EXPECTED LENGTH OF STAY: - - -  ACTUAL LENGTH OF STAY:          0                 Mariaa Rodríguez MD

## 2018-12-25 NOTE — PROGRESS NOTES
Primary Nurse Argelia Dawson RN and Triston Sahni RN performed a dual skin assessment on this patient No impairment noted  Gregorio score is 18

## 2018-12-25 NOTE — H&P
[address]   HISTORY AND PHYSICAL      Vergil Maynor ROSS  MR#: [id number]  : 1940  ACCOUNT #: [de-identified]   ADMIT DATE: 2018    CHIEF COMPLAINT:  Fall. HISTORY OF PRESENT ILLNESS:  This is a 80-year-old female with past medical history of anemia, CABG, syncope, bradycardia, history of , hypotension, hyperlipidemia, depression who presents to the hospital with the above-mentioned symptoms. Patient reports that she got up from her bed to go to the bathroom and had a sudden fall. The patient reports that she did not trip on anything but just \"fell. \"  Patient denies any pre or post-fall loss of consciousness or any other symptomology. Patient developed a right facial hematoma and was brought to the hospital for further management and evaluation. The patient reports that she did not have any loss of consciousness, did not have any seizure or seizure-like activity, did not have any chest pain, shortness of breath or any other symptoms. The patient also complains of some left shoulder pain, was found to have dislocation of the left shoulder and was requested to be admitted under the hospitalist service. Per the ER physician, the patient failed the ambulatory trial and also was unsteady on her feet. Patient currently is resting in bed. Patient had her left shoulder dislocation, reduced in the ER and currently her left arm is in a sling. Patient denies any headache, blurry vision, sore throat, trouble swallowing, any chest pain, shortness of breath, cough, fever, chills, abdominal pain, constipation, diarrhea, urinary symptoms, focal or generalized neurological weakness, recent travel, sick contacts, falls, injuries, hematemesis, melena, hemoptysis, hematuria besides the event that happened last night. 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 PAST MEDICAL HISTORY:  See above.     MEDICATIONS:  The patient is on diazepam 2 mg every 8 hours, Atarax 50 mg q.i.d., cyanocobalamin, multivitamin, pravastatin 40 mg every day, Mycolog 100 mg b.i.d., Abilify 10 mg  daily, hydralazine 25 mg b.i.d., carvedilol 12.5 mg b.i.d., cholecalciferol, , pantoprazole 40 mg daily, aspirin 81 mg daily, Ambien, Cymbalta 120 mg  daily. SOCIAL HISTORY:  Former smoker. Occasional alcohol. No IV drug abuse. ALLERGIES:  NO KNOWN ALLERGIES. FAMILY HISTORY:  Mother had a history of heart attack, hypertension. Father has a history of unknown medical problems, and brother has a history of Parkinson disease. REVIEW OF SYSTEMS:  All systems are reviewed and found to be essentially negative except for the symptoms mentioned above. PHYSICAL EXAMINATION:    VITAL SIGNS:  Temperature is 98.2, pulse 75, respiratory rate 16, blood pressure is 168/59, pulse ox 98% on room air. GENERAL:  Alert x3, awake, mildly distressed pleasant female appears to be stated age. HEENT:  Pupils equal and reactive to light. Large hematoma on the right side of the face. No hyphema. Extraocular muscles full and intact. Pupils equal and reactive to light. Dry mucous membranes. Tympanic membranes clear. NECK:  Supple. No meningeal signs. No crepitus. Full range of motion without pain or discomfort. CHEST:  Clear to auscultation bilaterally. HEART:  S1, S2.  ABDOMEN:  Soft, nontender, nondistended. Bowel sounds are physiologic. EXTREMITIES:  No clubbing, no cyanosis, no edema. Left upper extremity is in a sling. NEUROPSYCHIATRIC:  Pleasant mood and affect. Cranial nerves II-XII grossly intact. Moves all 4 extremities. SKIN:  Warm. White count 9.2, hemoglobin 14.7, hematocrit 44.7, platelets 029. Sodium 136, potassium 4.6, chloride 104, bicarb 21, anion gap 11, glucose 147, BUN 19, creatinine 1.1, calcium 9.3, bili total 0.5, ALT 30, AST 41, alk phos 119, , glucose 147.     CT of the head shows right preseptal and infraorbital soft tissue hematoma, no acute intracranial abnormalities. Focal hematoma on the right infraorbital region and marked right periorbital soft tissue is present. No fractures or fluid level, no intraorbital injury. 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  The right maxillary sinus mucosal thickening and retention cyst.      X-ray of the left shoulder shows anterior dislocation of shoulder. This was reduced. EKG shows a sinus tachycardia. ASSESSMENT AND PLAN:  1. Right facial injury with left shoulder anterior dislocation. Fortunately, the patient did not suffer any bony facial injuries. We will observe the patient in the hospital.  We will provide supportive care, pain control, fall precautions, orthostatic vitals and close monitoring. Continue left arm being in the sling. Further intervention will be per hospital course  . We will reassess as needed. 2.  Fall, most likely mechanical fall. PT consult has been requested. Patient will be on fall precautions, supportive care and continue to monitor. 3.  Probable mild early rhabdomyolysis. We will check CK levels. IV fluid, close monitoring. Further intervention will be per hospital course. 4.  History of hypertension. Continue home medications and continue to monitor. 5.  Dehydration. The patient appears to be mildly dehydrated . We will provide gentle IV hydration and repeat labs in the morning. Continue to monitor. 6.  Gastrointestinal and deep venous thrombosis prophylaxis. The patient will be on SCDs.         Dell Shoemaker MD MM/CARLOS  D: 12/24/2018 17:16     T: 12/24/2018 18:25  JOB #: [id number]

## 2018-12-26 LAB
ANION GAP SERPL CALC-SCNC: 5 MMOL/L (ref 5–15)
BASOPHILS # BLD: 0 K/UL (ref 0–0.1)
BASOPHILS NFR BLD: 1 % (ref 0–1)
BUN SERPL-MCNC: 22 MG/DL (ref 6–20)
BUN/CREAT SERPL: 19 (ref 12–20)
CALCIUM SERPL-MCNC: 8.6 MG/DL (ref 8.5–10.1)
CHLORIDE SERPL-SCNC: 111 MMOL/L (ref 97–108)
CO2 SERPL-SCNC: 23 MMOL/L (ref 21–32)
CREAT SERPL-MCNC: 1.14 MG/DL (ref 0.55–1.02)
DIFFERENTIAL METHOD BLD: ABNORMAL
EOSINOPHIL # BLD: 0.2 K/UL (ref 0–0.4)
EOSINOPHIL NFR BLD: 4 % (ref 0–7)
ERYTHROCYTE [DISTWIDTH] IN BLOOD BY AUTOMATED COUNT: 13.5 % (ref 11.5–14.5)
GLUCOSE SERPL-MCNC: 142 MG/DL (ref 65–100)
HCT VFR BLD AUTO: 33.4 % (ref 35–47)
HGB BLD-MCNC: 11 G/DL (ref 11.5–16)
IMM GRANULOCYTES # BLD: 0 K/UL (ref 0–0.04)
IMM GRANULOCYTES NFR BLD AUTO: 0 % (ref 0–0.5)
LYMPHOCYTES # BLD: 1.5 K/UL (ref 0.8–3.5)
LYMPHOCYTES NFR BLD: 25 % (ref 12–49)
MCH RBC QN AUTO: 30.6 PG (ref 26–34)
MCHC RBC AUTO-ENTMCNC: 32.9 G/DL (ref 30–36.5)
MCV RBC AUTO: 92.8 FL (ref 80–99)
MONOCYTES # BLD: 0.7 K/UL (ref 0–1)
MONOCYTES NFR BLD: 11 % (ref 5–13)
NEUTS SEG # BLD: 3.6 K/UL (ref 1.8–8)
NEUTS SEG NFR BLD: 60 % (ref 32–75)
NRBC # BLD: 0 K/UL (ref 0–0.01)
NRBC BLD-RTO: 0 PER 100 WBC
PLATELET # BLD AUTO: 165 K/UL (ref 150–400)
PMV BLD AUTO: 9.6 FL (ref 8.9–12.9)
POTASSIUM SERPL-SCNC: 3.9 MMOL/L (ref 3.5–5.1)
RBC # BLD AUTO: 3.6 M/UL (ref 3.8–5.2)
SODIUM SERPL-SCNC: 139 MMOL/L (ref 136–145)
WBC # BLD AUTO: 5.9 K/UL (ref 3.6–11)

## 2018-12-26 PROCEDURE — 85025 COMPLETE CBC W/AUTO DIFF WBC: CPT

## 2018-12-26 PROCEDURE — 74011250637 HC RX REV CODE- 250/637: Performed by: INTERNAL MEDICINE

## 2018-12-26 PROCEDURE — G8988 SELF CARE GOAL STATUS: HCPCS

## 2018-12-26 PROCEDURE — 97530 THERAPEUTIC ACTIVITIES: CPT

## 2018-12-26 PROCEDURE — 74011250637 HC RX REV CODE- 250/637: Performed by: FAMILY MEDICINE

## 2018-12-26 PROCEDURE — G8979 MOBILITY GOAL STATUS: HCPCS

## 2018-12-26 PROCEDURE — 99218 HC RM OBSERVATION: CPT

## 2018-12-26 PROCEDURE — 36415 COLL VENOUS BLD VENIPUNCTURE: CPT

## 2018-12-26 PROCEDURE — 97161 PT EVAL LOW COMPLEX 20 MIN: CPT

## 2018-12-26 PROCEDURE — 80048 BASIC METABOLIC PNL TOTAL CA: CPT

## 2018-12-26 PROCEDURE — 97116 GAIT TRAINING THERAPY: CPT

## 2018-12-26 PROCEDURE — G8987 SELF CARE CURRENT STATUS: HCPCS

## 2018-12-26 PROCEDURE — 97535 SELF CARE MNGMENT TRAINING: CPT

## 2018-12-26 PROCEDURE — G8978 MOBILITY CURRENT STATUS: HCPCS

## 2018-12-26 PROCEDURE — 97165 OT EVAL LOW COMPLEX 30 MIN: CPT

## 2018-12-26 RX ADMIN — OXYCODONE HYDROCHLORIDE 5 MG: 5 TABLET ORAL at 19:17

## 2018-12-26 RX ADMIN — CARVEDILOL 12.5 MG: 12.5 TABLET, FILM COATED ORAL at 16:52

## 2018-12-26 RX ADMIN — PRIMIDONE 100 MG: 50 TABLET ORAL at 21:21

## 2018-12-26 RX ADMIN — PRAVASTATIN SODIUM 40 MG: 20 TABLET ORAL at 21:21

## 2018-12-26 RX ADMIN — PANTOPRAZOLE SODIUM 40 MG: 40 TABLET, DELAYED RELEASE ORAL at 07:22

## 2018-12-26 RX ADMIN — ARIPIPRAZOLE 10 MG: 10 TABLET ORAL at 09:24

## 2018-12-26 RX ADMIN — CARVEDILOL 12.5 MG: 12.5 TABLET, FILM COATED ORAL at 09:23

## 2018-12-26 RX ADMIN — HYDRALAZINE HYDROCHLORIDE 25 MG: 25 TABLET, FILM COATED ORAL at 09:24

## 2018-12-26 RX ADMIN — HYDRALAZINE HYDROCHLORIDE 25 MG: 25 TABLET, FILM COATED ORAL at 21:22

## 2018-12-26 RX ADMIN — PRIMIDONE 100 MG: 50 TABLET ORAL at 09:24

## 2018-12-26 RX ADMIN — DULOXETINE 120 MG: 60 CAPSULE, DELAYED RELEASE ORAL at 09:24

## 2018-12-26 RX ADMIN — THERA TABS 1 TABLET: TAB at 09:24

## 2018-12-26 NOTE — PROGRESS NOTES
Hospitalist Progress Note  Dung Conn MD  Answering service: [phone] from in house phone  Cell: [phone]      Date of Service:  2018  NAME:  Pepe Da Silva  :  1940  MRN:  [id number]      Admission Summary:     81-year-old female with past medical history of anemia, CABG, syncope, bradycardia, hypotension, hyperlipidemia, depression who presents to the hospital after a fall. Patient reports that she got up from her bed to go to the bathroom and had a sudden fall. The patient reports that she did not trip on anything but just \"fell. \"  Patient denies any pre or post-fall loss of consciousness or any other symptomology. Patient developed a right facial hematoma and was brought to the hospital for further management and evaluation. The patient reports that she did not have any loss of consciousness, did not have any seizure or seizure-like activity, did not have any chest pain, shortness of breath or any other symptoms. The patient also complains of some left shoulder pain, was found to have dislocation of the left shoulder and was requested to be admitted under the hospitalist service. Per the ER physician, the patient failed the ambulatory trial and also was unsteady on her feet. Interval history / Subjective:     Patient feeling \"okay\" this AM. Some shoulder discomfort, though improved. Nursing tried to ambulate her yesterday and she was unsteady. Per nursing and CM there was no PT here yesterday, so patient could not be evaluated. This was inappropriate and a delay in care and discharge planning. PT evaluated today and placement is now pending. Assessment & Plan:     Right facial injury with left shoulder anterior dislocation. Fortunately, the patient did not suffer any bony facial injuries.     -unsteady on ambulation with nursing  -continue to observe   -PT and OT recommending rehab    Fall, most likely mechanical fall.  -patient still unsteady  -now awaiting SNF/rehab    Elevated CK, this is not rhabdo. Due to fall. History of hypertension: Continue home meds and adjust as needed. -spikes likely due to pain    Dehydration, resolved. Stopped fluids. Code status: Full  DVT prophylaxis: SCDs    Care Plan discussed with: Patient/Family and Nurse  Disposition: Awaiting placement to SNF/rehab     Hospital Problems  Date Reviewed: 12/24/2018          Codes Class Noted POA    * (Principal) Fall ICD-10-CM: W19. Kathaleen Door  ICD-9-CM: I198.9  12/24/2018 Unknown              Review of Systems:   A comprehensive review of systems was negative except for that written in the HPI. Vital Signs:    Last 24hrs VS reviewed since prior progress note. Most recent are:  Visit Vitals  /84 (BP 1 Location: Right arm, BP Patient Position: At rest)   Pulse 60   Temp 98.6 °F (37 °C)   Resp 16   SpO2 97%         Intake/Output Summary (Last 24 hours) at 12/26/2018 1334  Last data filed at 12/26/2018 0518  Gross per 24 hour   Intake    Output 3 ml   Net -3 ml        Physical Examination:        Constitutional:  No acute distress, cooperative, pleasant    ENT:  Neck supple   Resp:  CTA bilaterally. No accessory muscle use   CV:  Regular rhythm, normal rate, no murmur    GI:  Soft, non distended, non tender, normoactive bowel sounds    Musculoskeletal:  No edema, warm  Skin: Extensive facial ecchymoses     Neurologic:  Moves all extremities. AAOx3.  Arm in sling     Data Review:    Review and/or order of clinical lab test  Review and/or order of tests in the radiology section of CPT  Review and/or order of tests in the medicine section of CPT    Labs:     Recent Labs     12/26/18  0301 12/25/18 0228   WBC 5.9 7.0   HGB 11.0* 11.9   HCT 33.4* 35.9    199     Recent Labs     12/26/18  0301 12/25/18  0228 12/24/18  1254    139 136   K 3.9 3.7 4.6   * 108 104   CO2 23 24 21   BUN 22* 21* 19 CREA 1.14* 1.16* 1.10*   * 131* 147*   CA 8.6 8.2* 9.3     Recent Labs     12/24/18  1254   SGOT 41*   ALT 30   *   TBILI 0.5   TP 7.5   ALB 3.8   GLOB 3.7     No results for input(s): INR, PTP, APTT in the last 72 hours. No lab exists for component: INREXT, INREXT   No results for input(s): FE, TIBC, PSAT, FERR in the last 72 hours. No results found for: FOL, RBCF   No results for input(s): PH, PCO2, PO2 in the last 72 hours.   Recent Labs     12/25/18  0228 12/24/18  1722 12/24/18  1254   * 571* 648*     Lab Results   Component Value Date/Time    Cholesterol, total 238 (H) 12/24/2018 12:54 PM    HDL Cholesterol 63 12/24/2018 12:54 PM    LDL, calculated 156.2 (H) 12/24/2018 12:54 PM    Triglyceride 94 12/24/2018 12:54 PM    CHOL/HDL Ratio 3.8 12/24/2018 12:54 PM     Lab Results   Component Value Date/Time    Glucose (POC) 135 (H) 02/17/2015 06:46 AM    Glucose (POC) 129 (H) 02/16/2015 09:58 PM    Glucose (POC) 135 (H) 02/16/2015 05:13 PM    Glucose (POC) 159 (H) 02/16/2015 12:25 PM    Glucose (POC) 145 (H) 02/16/2015 06:40 AM     Lab Results   Component Value Date/Time    Color YELLOW/STRAW 02/12/2015 05:20 PM    Appearance CLEAR 02/12/2015 05:20 PM    Specific gravity 1.013 02/12/2015 05:20 PM    pH (UA) 6.5 02/12/2015 05:20 PM    Protein NEGATIVE  02/12/2015 05:20 PM    Glucose NEGATIVE  02/12/2015 05:20 PM    Ketone NEGATIVE  02/12/2015 05:20 PM    Bilirubin NEGATIVE  02/12/2015 05:20 PM    Urobilinogen 0.2 02/12/2015 05:20 PM    Nitrites NEGATIVE  02/12/2015 05:20 PM    Leukocyte Esterase NEGATIVE  02/12/2015 05:20 PM    Epithelial cells FEW 02/12/2015 05:20 PM    Bacteria NEGATIVE  02/12/2015 05:20 PM    WBC 0-4 02/12/2015 05:20 PM    RBC 0-5 02/12/2015 05:20 PM         Medications Reviewed:     Current Facility-Administered Medications   Medication Dose Route Frequency    oxyCODONE IR (ROXICODONE) tablet 5 mg  5 mg Oral Q6H PRN    ARIPiprazole (ABILIFY) tablet 10 mg  10 mg Oral DAILY    carvedilol (COREG) tablet 12.5 mg  12.5 mg Oral BID WITH MEALS    DULoxetine (CYMBALTA) capsule 120 mg  120 mg Oral DAILY    hydrALAZINE (APRESOLINE) tablet 25 mg  25 mg Oral Q12H    therapeutic multivitamin (THERAGRAN) tablet 1 Tab  1 Tab Oral DAILY    pantoprazole (PROTONIX) tablet 40 mg  40 mg Oral ACB    pravastatin (PRAVACHOL) tablet 40 mg  40 mg Oral QHS    primidone (MYSOLINE) tablet 100 mg  100 mg Oral BID    sodium chloride (NS) flush 5-10 mL  5-10 mL IntraVENous Q8H    sodium chloride (NS) flush 5-10 mL  5-10 mL IntraVENous PRN    acetaminophen (TYLENOL) tablet 650 mg  650 mg Oral Q4H PRN     ______________________________________________________________________  EXPECTED LENGTH OF STAY: - - -  ACTUAL LENGTH OF STAY:          0                 Linda Brand MD

## 2018-12-26 NOTE — PROGRESS NOTES
Problem: Mobility Impaired (Adult and Pediatric)  Goal: *Acute Goals and Plan of Care (Insert Text)  Physical Therapy Goals  Initiated 12/26/2018  1. Patient will move from supine to sit and sit to supine  in bed with modified independence within 7 day(s). 2.  Patient will transfer from bed to chair and chair to bed with supervision/set-up using the least restrictive device within 7 day(s). 3.  Patient will perform sit to stand with supervision/set-up within 7 day(s). 4.  Patient will ambulate with supervision/set-up for 150 feet with the least restrictive device within 7 day(s). physical Therapy EVALUATION  Patient: Rick Weiss (4 y.o. female)  Date: 12/26/2018  Primary Diagnosis: Fall       Precautions:   Fall(LUE immobilized in sling due to dislocation of shoulder), need to clarify how long pt will need to immobilize her left shoulder     ASSESSMENT :  Based on the objective data described below, the patient presents with decreased balance with fall risk and impaired functional mobility below her baseline level. Pt reports she uses a cane in her left hand to ambulate in her apartment since she had R TKA in April 2018. She occasionally uses her opposite hand for additional support and uses a rolling walker within the community. Pt reports she had several falls in the last year. Pt was admitted due to having a fall in her apartment and dislocated her left shoulder. She is currently is wearing a sling to immobilize her shoulder. Pt performed supine to sit and sit to stand with minimal assistance and ambulated with the cane in her right hand x 90 feet with CGA. Her gait is slow, guarded, and shuffled. Pt remained up in chair at end of visit. Reminded pt to call out for assistance for mobility and encouraged pt to ambulate with nursing to/from the bathroom instead of using bed pan or brief. Discussed discharge options with pt.   She states she has no support systems that would be able to provide the level of assistance recommended and does not have the funds to hire private care. Recommend rehab as pt is at increased risk for falls and has decreased independence with her gait and ADLs while immobilized in a sling. Patient will benefit from skilled intervention to address the above impairments. Patients rehabilitation potential is considered to be Good  Factors which may influence rehabilitation potential include:   []         None noted  []         Mental ability/status  []         Medical condition  [x]         Home/family situation and support systems  []         Safety awareness  []         Pain tolerance/management  []         Other:      PLAN :  Recommendations and Planned Interventions:  [x]           Bed Mobility Training             []    Neuromuscular Re-Education  [x]           Transfer Training                   []    Orthotic/Prosthetic Training  [x]           Gait Training                         []    Modalities  []           Therapeutic Exercises           []    Edema Management/Control  []           Therapeutic Activities            [x]    Patient and Family Training/Education  []           Other (comment):    Frequency/Duration: Patient will be followed by physical therapy  5 times a week to address goals. Discharge Recommendations: Rehab  Further Equipment Recommendations for Discharge: has a cane and rolling walker at home     SUBJECTIVE:   Patient stated [place] don't have anyone that can help me.     OBJECTIVE DATA SUMMARY:   HISTORY:    Past Medical History:   Diagnosis Date    Anxiety disorder     Atrial fibrillation ([facility] [address]     CAD (coronary artery disease) 2007    stents, CABG x 3v    Chronic kidney disease     Depression     Diabetes ([facility] [address]     High cholesterol     History of peptic ulcer     Bleeding ulcer with increased NSAID use    Hypertension     MI, old 2007    Valvular heart disease      Past Surgical History:   Procedure Laterality Date    HX CORONARY ARTERY BYPASS GRAFT      HX ORTHOPAEDIC Right     HX TONSILLECTOMY       Prior Level of Function/Home Situation: Pt ambulates with cane in her left hand at baseline. Occasionally uses opposite hand to hold onto furniture for additional support, has been using cane following R TKA in [place] April 2018, reports she uses a rolling walker when out in the community, states she had several falls in the last year  Personal factors and/or comorbidities impacting plan of care: lives alone, fall risk, reports no support systems    Home Situation  Home Environment: Independent living([facility])  # Steps to Enter: 0  One/Two Story Residence: One story  Living Alone: Yes  Support Systems: Family member(s)  Patient Expects to be Discharged to[de-identified] Apartment  Current DME Used/Available at Home: Bobbi Ganser, rolling, Cane, straight, Raised toilet seat, Grab bars  Tub or Shower Type: Shower(built in seats)    EXAMINATION/PRESENTATION/DECISION MAKING:   Critical Behavior:  Neurologic State: Alert, Appropriate for age  Orientation Level: Oriented X4  Cognition: Appropriate decision making, Appropriate for age attention/concentration, Appropriate safety awareness, Follows commands     Hearing:   Auditory  Auditory Impairment: None  Skin:  Intact, bruises on face due to recent fall    Range Of Motion:   uninvolved extremities within functional limits, left shoulder immobilized in sling                        Strength:     within functional limits                     Tone & Sensation:    intact                              Coordination:   functional        Functional Mobility:  Bed Mobility:     Supine to Sit: Minimum assistance;Assist x1, used bed rail to assist with transfer      Scooting: Supervision  Transfers:  Sit to Stand: Minimum assistance;Assist x1  Stand to Sit: Contact guard assistance;Assist x1                       Balance:   Sitting: Intact  Standing: Impaired  Standing - Static: Good  Standing - Dynamic : Fair  Ambulation/Gait Training:  Distance (ft): 90 Feet (ft)  Assistive Device: Gait belt;Cane, straight  Ambulation - Level of Assistance: Contact guard assistance;Assist x1     Gait Description (WDL): Exceptions to WDL  Gait Abnormalities: Shuffling gait              Speed/Samira: Slow  Step Length: Right shortened;Left shortened               Functional Measure:  Tinetti test:    Sitting Balance: 1  Arises: 1  Attempts to Rise: 1  Immediate Standing Balance: 1  Standing Balance: 1  Nudged: 1  Eyes Closed: 1  Turn 360 Degrees - Continuous/Discontinuous: 1  Turn 360 Degrees - Steady/Unsteady: 1  Sitting Down: 2  Balance Score: 11  Indication of Gait: 1  R Step Length/Height: 1  L Step Length/Height: 1  R Foot Clearance: 1  L Foot Clearance: 1  Step Symmetry: 1  Step Continuity: 1  Path: 1  Trunk: 1  Walking Time: 1  Gait Score: 10  Total Score: 21       Tinetti Test and G-code impairment scale:  Percentage of Impairment CH    0%   CI    1-19% CJ    20-39% CK    40-59% CL    60-79% CM    80-99% CN     100%   Tinetti  Score 0-28 28 23-27 17-22 12-16 6-11 1-5 0       Tinetti Tool Score Risk of Falls  <19 = High Fall Risk  19-24 = Moderate Fall Risk  25-28 = Low Fall Risk  Tinetti ME. Performance-Oriented Assessment of Mobility Problems in Elderly Patients. [facility]; [id number]. (Scoring Description: PT Bulletin Feb. 10, 1993)    Older adults: Celestino Perez et al, 2009; n = 1000 [facility] elderly evaluated with ABC, KATJA, ADL, and IADL)  · Mean KATJA score for males aged 69-68 years = 26.21(3.40)  · Mean KATJA score for females age 69-68 years = 25.16(4.30)  · Mean KATJA score for males over 80 years = 23.29(6.02)  · Mean KATJA score for females over 80 years = 17.20(8.32)       G codes: In compliance with CMSs Claims Based Outcome Reporting, the following G-code set was chosen for this patient based on their primary functional limitation being treated:     The outcome measure chosen to determine the severity of the functional limitation was the Tinetti with a score of 21/28 which was correlated with the impairment scale. ? Mobility - Walking and Moving Around:     - CURRENT STATUS: CJ - 20%-39% impaired, limited or restricted    - GOAL STATUS: CI - 1%-19% impaired, limited or restricted    - D/C STATUS:  ---------------To be determined---------------      Physical Therapy Evaluation Charge Determination   History Examination Presentation Decision-Making   MEDIUM  Complexity : 1-2 comorbidities / personal factors will impact the outcome/ POC  LOW Complexity : 1-2 Standardized tests and measures addressing body structure, function, activity limitation and / or participation in recreation  LOW Complexity : Stable, uncomplicated  LOW Complexity : FOTO score of       Based on the above components, the patient evaluation is determined to be of the following complexity level: LOW     Pain:  Pain Scale 1: Numeric (0 - 10)  Pain Intensity 1: 0              Activity Tolerance:   Good     After treatment:   [x]         Patient left in no apparent distress sitting up in chair  []         Patient left in no apparent distress in bed  [x]         Call bell left within reach  [x]         Nursing notified  []         Caregiver present  []         Bed alarm activated    COMMUNICATION/EDUCATION:   The patients plan of care was discussed with: Registered Nurse. [x]         Fall prevention education was provided and the patient/caregiver indicated understanding. []         Patient/family have participated as able in goal setting and plan of care. [x]         Patient/family agree to work toward stated goals and plan of care. []         Patient understands intent and goals of therapy, but is neutral about his/her participation. []         Patient is unable to participate in goal setting and plan of care.     Thank you for this referral.  Shadia Rajan   Time Calculation: 36 mins

## 2018-12-26 NOTE — PROGRESS NOTES
Bedside shift change report given to Mirta Langford and Mirian Ramos (oncoming nurse) by Doc Yeh (offgoing nurse). Report included the following information SBAR, Kardex, Intake/Output, MAR and Recent Results.

## 2018-12-26 NOTE — PROGRESS NOTES
2030: I was notified by [facility] that when walking by patient room patient was up standing by door yelling for someone to help her. I went into room after being notified. Patient was very upset and distraught. Patient stated that no one had answered her call for more than 30 minutes. Patient showed me that she was pressing red call button, but the red button she was pressing was the on button for the phone. I retaught patient how to use call bell and use phone to call nurse's phone, and that it was unsafe to ambulate without assistance as well. Patient stated she had soiled herself so I changed her into a clean brief. Patient was still very angry and agitated. Charge nurse Johan Patel RN went in to speak to the patient as well to help diffuse situation and reiterated the teaching as well. 2115:  I went in to give patient medications and apologized again to the patient that she felt she could not reach someone. Patient was calm at this point and had verbalized she must have been using the call systems incorrectly. Will continue to monitor patient.

## 2018-12-26 NOTE — PROGRESS NOTES
Problem: Falls - Risk of  Goal: *Absence of Falls  Document Maylin Fall Risk and appropriate interventions in the flowsheet.   Outcome: Progressing Towards Goal  Fall Risk Interventions:  Mobility Interventions: Communicate number of staff needed for ambulation/transfer, Patient to call before getting OOB         Medication Interventions: Patient to call before getting OOB, Evaluate medications/consider consulting pharmacy    Elimination Interventions: Call light in reach, Patient to call for help with toileting needs, Toileting schedule/hourly rounds    History of Falls Interventions: Evaluate medications/consider consulting pharmacy, Investigate reason for fall

## 2018-12-26 NOTE — PROGRESS NOTES
Problem: Self Care Deficits Care Plan (Adult)  Goal: *Acute Goals and Plan of Care (Insert Text)  Occupational Therapy Goals  Initiated 12/26/2018  1. Patient will perform grooming at the sink with supervision/set-up within 7 day(s). 2.  Patient will perform lower body dressing with supervision/set-up within 7 day(s). 3.  Patient will perform bathing with supervision/set-up within 7 day(s). 4.  Patient will perform toilet transfers with supervision/set-up least restrictive DME within 7 day(s). 5.  Patient will perform all aspects of toileting with supervision/set-up within 7 day(s). 6.  Patient will participate in upper extremity therapeutic exercise/activities with supervision/set-up for 5 minutes within 7 day(s). 7.  Patient will utilize energy conservation techniques during functional activities with verbal cues within 7 day(s). Occupational Therapy EVALUATION  Patient: Moise Ryan (67 y.o. female)  Date: 12/26/2018  Primary Diagnosis: Fall       Precautions: Fall(LUE immobilized in sling)    ASSESSMENT :  Based on the objective data described below, the patient presents with Min-Max A for upper body ADLs (increased A for sling management), SPV-Min A for lower body ADLs, & CGA-Min A for functional mobility with HHA s/p GLF with L shoulder dislocation. ED performed closed reduction of L shoulder, currently immobilized in sling, Max A for proper positioning & donning. PTA, patient IND-Mod I, living alone at [facility], IND with ADLs, using SPC for short distances, RW for longer distances. Baseline impaired central vision in R eye, R visual periphery & L acuity intact. Hx of multiple falls within the last six months. Received up in the chair, able to reach to feet & tailor sit with decreased RLE ROM (TKR in April), Min A for transfers with R HHA to simulate [facility] use. CGA-Min A for bathroom mobility & standing balance at the sink for grooming, Min A to manage tools for toothbrushing.  Returned to the chair, reinforced immobilization of L shoulder & calling for staff to assist with all mobility, patient acknowledging understanding. Discussed recommendation to d/c to rehab as patient remains a high fall risk, lives alone, has had multiple falls in the past 6 months, and requires assist for all OOB activities & mobility. Patient agreeable to rehab, discussed with CM. Recommend with nursing patient to complete as able in order to maintain strength, endurance and independence: ADLs with supervision/setup, OOB to chair 3x/day and mobilizing to the bathroom for toileting with 1 assist & HHA. Thank you for your assistance. Patient will benefit from skilled intervention to address the above impairments. Patients rehabilitation potential is considered to be Good  Factors which may influence rehabilitation potential include:   []             None noted  []             Mental ability/status  []             Medical condition  []             Home/family situation and support systems  []             Safety awareness  []             Pain tolerance/management  []             Other:      PLAN :  Recommendations and Planned Interventions:  [x]               Self Care Training                  [x]        Therapeutic Activities  [x]               Functional Mobility Training    []        Cognitive Retraining  [x]               Therapeutic Exercises           [x]        Endurance Activities  [x]               Balance Training                   []        Neuromuscular Re-Education  []               Visual/Perceptual Training     [x]   Home Safety Training  [x]               Patient Education                 [x]        Family Training/Education  []               Other (comment):    Frequency/Duration: Patient will be followed by occupational therapy 5 times a week to address goals.   Discharge Recommendations: Rehab  Further Equipment Recommendations for Discharge: TBD by rehab     SUBJECTIVE:   Patient stated I have this rug I'm about to throw out because it is supposed to stick to the floor but it doesn't and I've fallen because of it before.     OBJECTIVE DATA SUMMARY:   HISTORY:   Past Medical History:   Diagnosis Date    Anxiety disorder     Atrial fibrillation [facility] [address]     CAD (coronary artery disease) 2007    stents, CABG x 3v    Chronic kidney disease     Depression     Diabetes ([facility] [address]     High cholesterol     History of peptic ulcer     Bleeding ulcer with increased NSAID use    Hypertension     MI, old 2007    Valvular heart disease      Past Surgical History:   Procedure Laterality Date    HX CORONARY ARTERY BYPASS GRAFT      HX ORTHOPAEDIC Right     HX TONSILLECTOMY         Prior Level of Function/Environment/Context: Living in ILF alone, IND-Mod I for ADLs, ambulating short distances with SPC, longer distances with RW/BUE support (cart support at grocery store). Baseline impaired central vision in R eye, L WNL, no longer drives. At least 3 falls in the last 6 months    [address]: Independent living([facility])  # Steps to Enter: 0  One/Two Story Residence: One story  Living Alone: Yes  Support Systems: Family member(s)  Patient Expects to be Discharged to[de-identified] Apartment  Current DME Used/Available at Home: Walker, rolling, Cane, straight, Raised toilet seat, Grab bars  Tub or Shower Type: Shower(built in seats)    Hand dominance: Right    EXAMINATION OF PERFORMANCE DEFICITS:  Cognitive/Behavioral Status:  Neurologic State: Alert  Orientation Level: Oriented X4  Cognition: Appropriate for age attention/concentration; Follows commands;Poor safety awareness  Perception: Appears intact  Perseveration: No perseveration noted  Safety/Judgement: Decreased awareness of environment;Decreased awareness of need for safety;Decreased awareness of need for assistance;Decreased insight into deficits    Skin: Scattered bruising on face & BUEs    Edema: Orbital & facial swelling    Hearing: Auditory  Auditory Impairment: None    Vision/Perceptual:    Tracking: Able to track stimulus in all quadrants w/o difficulty              Visual Fields: (baseline impaired R central vision, L WNL)       Acuity: Within Defined Limits(L eye WNL, R peripherals WNL)         Range of Motion:  BUEs  AROM: Generally decreased, functional(bilateral RTC injuries, LUE immobilized in sling)  PROM: Generally decreased, functional                      Strength:  BUEs  Strength: Generally decreased, functional(bilateral RTC injuries, LUE immobilized in sling)                Coordination:  Coordination: Within functional limits  Fine Motor Skills-Upper: Left Intact; Right Intact    Gross Motor Skills-Upper: Left Impaired;Right Intact(bilateral RTC injuries, LUE immobilized in sling)    Tone & Sensation:  BUEs  Tone: Normal  Sensation: Intact                      Balance:  Sitting: Intact  Standing: Impaired; With support(HHA to simulate SPC)  Standing - Static: Good  Standing - Dynamic : Fair    Functional Mobility and Transfers for ADLs:  Bed Mobility:  Supine to Sit: (received in the chair)  Sit to Supine: (up in chair)  Scooting: Supervision    Transfers:  Sit to Stand: Minimum assistance; Additional time(2 attempts to stand from chair with HHA)  Stand to Sit: Contact guard assistance(cues for safety/hand placement)  Bathroom Mobility: Contact guard assistance(generalized unsteadiness, fear of falling)  Toilet Transfer : Minimum assistance; Adaptive equipment(HHA for simulation of SPC, )    ADL Assessment:  Feeding: Minimum assistance(infer for cutting & container management)    Oral Facial Hygiene/Grooming: Minimum assistance(A for opening tools d/t weakness & LUE immobilization)    Bathing:  Moderate assistance(infer for balance, activity tolerance, & safety)    Upper Body Dressing: Minimum assistance(infer 2* LUE immobilization & safety)    Lower Body Dressing: Minimum assistance(infer for standing balance & pulling up )    Toileting: Minimum assistance(infer for standing balance & safety )                ADL Intervention and task modifications:       Grooming  Grooming Assistance: Minimum assistance(standing at the sink x3 minutes )  Washing Face: Minimum assistance  Washing Hands: Contact guard assistance; Compensatory technique training(cues for body positioning)  Brushing Teeth: Minimum assistance(intermittently for standing balance, A to open toothpaste)  Cues: Verbal cues provided; Tactile cues provided;Physical assistance         Lower Body Bathing  Lower Body : Minimum assistance; Compensatory technique training(simulated in the chair, Min A for standing balance)  Cues: Verbal cues provided;Visual cues provided    Upper Body Dressing Assistance  Orthotics(Brace): Maximum assistance; Compensatory technique training(adjusting sling for proper positioning)  Cues: Verbal cues provided;Physical assistance    Lower Body Dressing Assistance  Underpants: Compensatory technique training  Pants With Elastic Waist: Compensatory technique training  Pants With Button/Zipper: Compensatory technique training  Socks: Stand-by assistance; Compensatory technique training(seated in chair with tailor sit)  Leg Crossed Method Used: Yes(decreased RLE ROM)  Position Performed: Seated in chair  Cues: Verbal cues provided;Visual cues provided    Toileting  Toileting Assistance: Minimum assistance(simulated in bathroom, cues for safety with HHA )  Bladder Hygiene: Minimum assistance  Bowel Hygiene:  Moderate assistance  Clothing Management: Minimum assistance  Cues: Verbal cues provided;Visual cues provided  Adaptive Equipment: (HHA for balance & cues for safety)    Cognitive Retraining  Safety/Judgement: Decreased awareness of environment;Decreased awareness of need for safety;Decreased awareness of need for assistance;Decreased insight into deficits    Therapeutic Exercise:  Ambulation to/from bathroom with HHA CGA-Min A, standing at the sink x3 minutes     Functional Measure:  Barthel Index:    Bathin  Bladder: 5  Bowels: 10  Groomin  Dressin  Feeding: 10  Mobility: 10  Stairs: 0  Toilet Use: 5  Transfer (Bed to Chair and Back): 10  Total: 55       Barthel and G-code impairment scale:  Percentage of impairment CH  0% CI  1-19% CJ  20-39% CK  40-59% CL  60-79% CM  80-99% CN  100%   Barthel Score 0-100 100 99-80 79-60 59-40 20-39 1-19   0   Barthel Score 0-20 20 17-19 13-16 9-12 5-8 1-4 0      The Barthel ADL Index: Guidelines  1. The index should be used as a record of what a patient does, not as a record of what a patient could do. 2. The main aim is to establish degree of independence from any help, physical or verbal, however minor and for whatever reason. 3. The need for supervision renders the patient not independent. 4. A patient's performance should be established using the best available evidence. Asking the patient, friends/relatives and nurses are the usual sources, but direct observation and common sense are also important. However direct testing is not needed. 5. Usually the patient's performance over the preceding 24-48 hours is important, but occasionally longer periods will be relevant. 6. Middle categories imply that the patient supplies over 50 per cent of the effort. 7. Use of aids to be independent is allowed. Saul Goldman., Barthel, DRennyW. (4371). Functional evaluation: the Barthel Index. [facility] (14)2. LOGAN CaputoJRennyMJELLY, Lina Pantoja., Nolan Mcelroy., Brian Ling, [address] [place] [address] (). Measuring the change indisability after inpatient rehabilitation; comparison of the responsiveness of the Barthel Index and Functional [place] Measure. Journal of Neurology, Neurosurgery, and Psychiatry, 66(4), 061-024. Genet Riley, N.J.A, APARNA Ventura, & Clarence Denise, M.A. (2004.) Assessment of post-stroke quality of life in cost-effectiveness studies: The usefulness of the Barthel Index and the EuroQoL-5D. Quality of Life Research, 13, 620-25         G codes: In compliance with CMSs Claims Based Outcome Reporting, the following G-code set was chosen for this patient based on their primary functional limitation being treated: The outcome measure chosen to determine the severity of the functional limitation was the Barthel Index with a score of 55/100 which was correlated with the impairment scale. ? Self Care:     - CURRENT STATUS: CK - 40%-59% impaired, limited or restricted    - GOAL STATUS: CJ - 20%-39% impaired, limited or restricted    - D/C STATUS:  ---------------To be determined---------------     Occupational Therapy Evaluation Charge Determination   History Examination Decision-Making   LOW Complexity : Brief history review  LOW Complexity : 1-3 performance deficits relating to physical, cognitive , or psychosocial skils that result in activity limitations and / or participation restrictions  LOW Complexity : No comorbidities that affect functional and no verbal or physical assistance needed to complete eval tasks       Based on the above components, the patient evaluation is determined to be of the following complexity level: LOW   Pain:  Pain Scale 1: Numeric (0 - 10)  Pain Intensity 1: 0              Activity Tolerance:   Good    Please refer to the flowsheet for vital signs taken during this treatment. After treatment:   [x] Patient left in no apparent distress sitting up in chair  [] Patient left in no apparent distress in bed  [x] Call bell left within reach  [x] Nursing notified  [] Caregiver present  [] Bed alarm activated    COMMUNICATION/EDUCATION:   The patients plan of care was discussed with: Physical Therapist, Registered Nurse and . [x] Home safety education was provided and the patient/caregiver indicated understanding. [x] Patient/family have participated as able in goal setting and plan of care.   [x] Patient/family agree to work toward stated goals and plan of care. [] Patient understands intent and goals of therapy, but is neutral about his/her participation. [] Patient is unable to participate in goal setting and plan of care. This patients plan of care is appropriate for delegation to BEL.     Thank you for this referral.  Maria Guadalupe Uribe OT  Time Calculation: 30 mins

## 2018-12-26 NOTE — PROGRESS NOTES
Bedside shift change report given to Vanesa Bradford RN (oncoming nurse) by Luana Guerrero RN (offgoing nurse). Report included the following information SBAR and Kardex.

## 2018-12-26 NOTE — PROGRESS NOTES
Chart reviewed. Therapy is recommending SNF rehab. The patient is in agreement. CRM met with the patient and sent referral to [facility]. The patient will need insurance authorization. Will follow.  TERESA

## 2018-12-27 PROCEDURE — 74011250637 HC RX REV CODE- 250/637: Performed by: INTERNAL MEDICINE

## 2018-12-27 PROCEDURE — 94760 N-INVAS EAR/PLS OXIMETRY 1: CPT

## 2018-12-27 PROCEDURE — 97110 THERAPEUTIC EXERCISES: CPT

## 2018-12-27 PROCEDURE — 97530 THERAPEUTIC ACTIVITIES: CPT

## 2018-12-27 PROCEDURE — 97116 GAIT TRAINING THERAPY: CPT

## 2018-12-27 PROCEDURE — 99218 HC RM OBSERVATION: CPT

## 2018-12-27 PROCEDURE — 74011250637 HC RX REV CODE- 250/637: Performed by: FAMILY MEDICINE

## 2018-12-27 RX ADMIN — CARVEDILOL 12.5 MG: 12.5 TABLET, FILM COATED ORAL at 16:12

## 2018-12-27 RX ADMIN — HYDRALAZINE HYDROCHLORIDE 25 MG: 25 TABLET, FILM COATED ORAL at 20:25

## 2018-12-27 RX ADMIN — OXYCODONE HYDROCHLORIDE 5 MG: 5 TABLET ORAL at 20:25

## 2018-12-27 RX ADMIN — ARIPIPRAZOLE 10 MG: 10 TABLET ORAL at 10:26

## 2018-12-27 RX ADMIN — ACETAMINOPHEN 650 MG: 325 TABLET ORAL at 20:31

## 2018-12-27 RX ADMIN — OXYCODONE HYDROCHLORIDE 5 MG: 5 TABLET ORAL at 07:06

## 2018-12-27 RX ADMIN — HYDRALAZINE HYDROCHLORIDE 25 MG: 25 TABLET, FILM COATED ORAL at 09:45

## 2018-12-27 RX ADMIN — PANTOPRAZOLE SODIUM 40 MG: 40 TABLET, DELAYED RELEASE ORAL at 07:04

## 2018-12-27 RX ADMIN — DULOXETINE 120 MG: 60 CAPSULE, DELAYED RELEASE ORAL at 09:44

## 2018-12-27 RX ADMIN — THERA TABS 1 TABLET: TAB at 09:45

## 2018-12-27 RX ADMIN — PRAVASTATIN SODIUM 40 MG: 20 TABLET ORAL at 21:09

## 2018-12-27 RX ADMIN — CARVEDILOL 12.5 MG: 12.5 TABLET, FILM COATED ORAL at 09:45

## 2018-12-27 RX ADMIN — ACETAMINOPHEN 650 MG: 325 TABLET ORAL at 13:38

## 2018-12-27 RX ADMIN — PRIMIDONE 100 MG: 50 TABLET ORAL at 20:25

## 2018-12-27 RX ADMIN — OXYCODONE HYDROCHLORIDE 5 MG: 5 TABLET ORAL at 13:38

## 2018-12-27 RX ADMIN — PRIMIDONE 100 MG: 50 TABLET ORAL at 09:45

## 2018-12-27 NOTE — PROGRESS NOTES
Problem: Mobility Impaired (Adult and Pediatric)  Goal: *Acute Goals and Plan of Care (Insert Text)  Physical Therapy Goals  Initiated 12/26/2018  1. Patient will move from supine to sit and sit to supine  in bed with modified independence within 7 day(s). 2.  Patient will transfer from bed to chair and chair to bed with supervision/set-up using the least restrictive device within 7 day(s). 3.  Patient will perform sit to stand with supervision/set-up within 7 day(s). 4.  Patient will ambulate with supervision/set-up for 150 feet with the least restrictive device within 7 day(s). Outcome: Progressing Towards Goal  physical Therapy TREATMENT  Patient: Zohra Trevino (21 y.o. female)  Date: 12/27/2018  Diagnosis: Fall Fall      Precautions: Fall(LUE immobilized in sling)  Chart, physical therapy assessment, plan of care and goals were reviewed. ASSESSMENT:  Pt received supine in bed w/HOB elevated (45degrees). Pt agreeable to PT but requesting to use BR prior to gait in [place]. Pt able to transfer to EOB w/ Min A to trunk/shoulders. Pt able to stand w/SBA-CGA. Amb to/from BR and in [place] w/ [facility] in R hand. Provided verbal cuing for gait w/ SPC in R hand as pt uses cane in Left hand at baseline. Pt continues to amb w/ WBOS and shuffled gait; noted trunk sway but no LOB or significant path deviations noted. Pt agreeable to sit up in chair at bedside; all needs in reach and met. Pillow placed underneath LUE for additional support. Encouraged pt to continue ROM of wrist and phalanges  (LUE) to maintain ROM. Instructed pt to call for NSG assist when ready to return to bed. VSS throughout session. Pt does live alone and would benefit from Rehab (SNF) to increase strength,balance, and safety prior to return home.        Progression toward goals:  [x]    Improving appropriately and progressing toward goals  []    Improving slowly and progressing toward goals  []    Not making progress toward goals and plan of care will be adjusted     PLAN:  Patient continues to benefit from skilled intervention to address the above impairments. Continue treatment per established plan of care. Discharge Recommendations:  Rehab  Further Equipment Recommendations for Discharge: TBD (pt does own [facility])     SUBJECTIVE:   Patient stated Ya'll need the gowns that go over you head.     OBJECTIVE DATA SUMMARY:   Critical Behavior:  Neurologic State: Alert  Orientation Level: Oriented to time  Cognition: Appropriate decision making, Appropriate for age attention/concentration, Appropriate safety awareness, Follows commands  Safety/Judgement: Decreased awareness of environment, Decreased awareness of need for safety, Decreased awareness of need for assistance, Decreased insight into deficits  Functional Mobility Training:  Bed Mobility:     Supine to Sit: Minimum assistance(Min A at shoulders)  Sit to Supine: (pt remained OOB in chair)  Scooting: Supervision        Transfers:  Sit to Stand: Contact guard assistance;Minimum assistance  Stand to Sit: Contact guard assistance                             Balance:  Sitting: Intact  Standing: Impaired; With support  Standing - Static: Good  Standing - Dynamic : Fair  Ambulation/Gait Training:  Distance (ft): 90 Feet (ft)(x2)  Assistive Device: Cane, straight;Gait belt  Ambulation - Level of Assistance: Contact guard assistance        Gait Abnormalities: Decreased step clearance;Shuffling gait; Step to gait;Trunk sway increased              Speed/Samira: Shuffled; Slow  Step Length: Left shortened;Right shortened                           Pain:  Pain Scale 1: Numeric (0 - 10)  Pain Intensity 1: 2  Pain Location 1: Shoulder  Pain Orientation 1: Left  Pain Description 1: Aching  Pain Intervention(s) 1: Rest;Elevation  Activity Tolerance:   VSS. Please refer to the flowsheet for vital signs taken during this treatment.   After treatment:   [x]    Patient left in no apparent distress sitting up in chair  [] Patient left in no apparent distress in bed  [x]    Call bell left within reach  [x]    Nursing notified  []    Caregiver present  []    Bed alarm activated    COMMUNICATION/COLLABORATION:   The patients plan of care was discussed with: Registered Nurse    Josiah HENRY Means,PTA   Time Calculation: 39 mins

## 2018-12-27 NOTE — PROGRESS NOTES
Hospitalist Progress Note  Benito Jesus MD  Answering service: [phone] from in house phone  Cell: [phone]      Date of Service:  2018  NAME:  Colt Mayo  :  1940  MRN:  [id number]      Admission Summary:     81-year-old female with past medical history of anemia, CABG, syncope, bradycardia, hypotension, hyperlipidemia, depression who presents to the hospital after a fall. Patient reports that she got up from her bed to go to the bathroom and had a sudden fall. The patient reports that she did not trip on anything but just \"fell. \"  Patient denies any pre or post-fall loss of consciousness or any other symptomology. Patient developed a right facial hematoma and was brought to the hospital for further management and evaluation. The patient reports that she did not have any loss of consciousness, did not have any seizure or seizure-like activity, did not have any chest pain, shortness of breath or any other symptoms. The patient also complains of some left shoulder pain, was found to have dislocation of the left shoulder and was requested to be admitted under the hospitalist service. Per the ER physician, the patient failed the ambulatory trial and also was unsteady on her feet. Interval history / Subjective:     Doing ok. Johnney Dancer for d/c .still awaiting auth. D/w CM     Assessment & Plan:     Right facial injury with left shoulder anterior dislocation. Fortunately, the patient did not suffer any bony facial injuries. -unsteady on ambulation with nursing  -continue to observe   -PT and OT recommending rehab    Fall, most likely mechanical fall.  -patient still unsteady  -now awaiting SNF/rehab    Elevated CK, this is not rhabdo. Due to fall. History of hypertension: Continue home meds and adjust as needed. -spikes likely due to pain    Dehydration, resolved. Stopped fluids.     Code status: Full  DVT prophylaxis: SCDs    Care Plan discussed with: Patient/Family and Nurse  Disposition: Awaiting placement to SNF/rehab     Hospital Problems  Date Reviewed: 12/24/2018          Codes Class Noted POA    * (Principal) Fall ICD-10-CM: W19Renny Suarez  ICD-9-CM: R701.5  12/24/2018 Unknown              Review of Systems:   A comprehensive review of systems was negative except for that written in the HPI. Vital Signs:    Last 24hrs VS reviewed since prior progress note. Most recent are:  Visit Vitals  /73 (BP Patient Position: Post activity; Sitting)   Pulse 62   Temp 98.2 °F (36.8 °C)   Resp 18   SpO2 98%         Intake/Output Summary (Last 24 hours) at 12/27/2018 1734  Last data filed at 12/27/2018 0636  Gross per 24 hour   Intake 240 ml   Output    Net 240 ml        Physical Examination:        Constitutional:  No acute distress, cooperative, pleasant    ENT:  Neck supple   Resp:  CTA bilaterally. No accessory muscle use   CV:  Regular rhythm, normal rate, no murmur    GI:  Soft, non distended, non tender, normoactive bowel sounds    Musculoskeletal:  No edema, warm  Skin: Extensive facial ecchymoses     Neurologic:  Moves all extremities. AAOx3. Arm in sling     Data Review:    Review and/or order of clinical lab test  Review and/or order of tests in the radiology section of CPT  Review and/or order of tests in the medicine section of CPT    Labs:     Recent Labs     12/26/18  0301 12/25/18 0228   WBC 5.9 7.0   HGB 11.0* 11.9   HCT 33.4* 35.9    199     Recent Labs     12/26/18  0301 12/25/18  0228    139   K 3.9 3.7   * 108   CO2 23 24   BUN 22* 21*   CREA 1.14* 1.16*   * 131*   CA 8.6 8.2*     No results for input(s): SGOT, GPT, ALT, AP, TBIL, TBILI, TP, ALB, GLOB, GGT, AML, LPSE in the last 72 hours. No lab exists for component: AMYP, HLPSE  No results for input(s): INR, PTP, APTT in the last 72 hours.     No lab exists for component: INREXT, INREXT   No results for input(s): FE, TIBC, PSAT, FERR in the last 72 hours. No results found for: FOL, RBCF   No results for input(s): PH, PCO2, PO2 in the last 72 hours.   Recent Labs     12/25/18  0228   *     Lab Results   Component Value Date/Time    Cholesterol, total 238 (H) 12/24/2018 12:54 PM    HDL Cholesterol 63 12/24/2018 12:54 PM    LDL, calculated 156.2 (H) 12/24/2018 12:54 PM    Triglyceride 94 12/24/2018 12:54 PM    CHOL/HDL Ratio 3.8 12/24/2018 12:54 PM     Lab Results   Component Value Date/Time    Glucose (POC) 135 (H) 02/17/2015 06:46 AM    Glucose (POC) 129 (H) 02/16/2015 09:58 PM    Glucose (POC) 135 (H) 02/16/2015 05:13 PM    Glucose (POC) 159 (H) 02/16/2015 12:25 PM    Glucose (POC) 145 (H) 02/16/2015 06:40 AM     Lab Results   Component Value Date/Time    Color YELLOW/STRAW 02/12/2015 05:20 PM    Appearance CLEAR 02/12/2015 05:20 PM    Specific gravity 1.013 02/12/2015 05:20 PM    pH (UA) 6.5 02/12/2015 05:20 PM    Protein NEGATIVE  02/12/2015 05:20 PM    Glucose NEGATIVE  02/12/2015 05:20 PM    Ketone NEGATIVE  02/12/2015 05:20 PM    Bilirubin NEGATIVE  02/12/2015 05:20 PM    Urobilinogen 0.2 02/12/2015 05:20 PM    Nitrites NEGATIVE  02/12/2015 05:20 PM    Leukocyte Esterase NEGATIVE  02/12/2015 05:20 PM    Epithelial cells FEW 02/12/2015 05:20 PM    Bacteria NEGATIVE  02/12/2015 05:20 PM    WBC 0-4 02/12/2015 05:20 PM    RBC 0-5 02/12/2015 05:20 PM         Medications Reviewed:     Current Facility-Administered Medications   Medication Dose Route Frequency    oxyCODONE IR (ROXICODONE) tablet 5 mg  5 mg Oral Q6H PRN    ARIPiprazole (ABILIFY) tablet 10 mg  10 mg Oral DAILY    carvedilol (COREG) tablet 12.5 mg  12.5 mg Oral BID WITH MEALS    DULoxetine (CYMBALTA) capsule 120 mg  120 mg Oral DAILY    hydrALAZINE (APRESOLINE) tablet 25 mg  25 mg Oral Q12H    therapeutic multivitamin (THERAGRAN) tablet 1 Tab  1 Tab Oral DAILY    pantoprazole (PROTONIX) tablet 40 mg  40 mg Oral ACB    pravastatin (PRAVACHOL) tablet 40 mg  40 mg Oral QHS    primidone (MYSOLINE) tablet 100 mg  100 mg Oral BID    sodium chloride (NS) flush 5-10 mL  5-10 mL IntraVENous Q8H    sodium chloride (NS) flush 5-10 mL  5-10 mL IntraVENous PRN    acetaminophen (TYLENOL) tablet 650 mg  650 mg Oral Q4H PRN     ______________________________________________________________________  EXPECTED LENGTH OF STAY: - - -  ACTUAL LENGTH OF STAY:          0                 Ever Hedrick MD

## 2018-12-27 NOTE — PROGRESS NOTES
Bedside and Verbal shift change report given to Sudarshan Heredia RN (oncoming nurse) by Indira Gómez RN (offgoing nurse). Report included the following information SBAR, Kardex, Intake/Output and MAR.

## 2018-12-27 NOTE — PROGRESS NOTES
Problem: Self Care Deficits Care Plan (Adult)  Goal: *Acute Goals and Plan of Care (Insert Text)  Occupational Therapy Goals  Initiated 12/26/2018  1. Patient will perform grooming at the sink with supervision/set-up within 7 day(s). 2.  Patient will perform lower body dressing with supervision/set-up within 7 day(s). 3.  Patient will perform bathing with supervision/set-up within 7 day(s). 4.  Patient will perform toilet transfers with supervision/set-up least restrictive DME within 7 day(s). 5.  Patient will perform all aspects of toileting with supervision/set-up within 7 day(s). 6.  Patient will participate in upper extremity therapeutic exercise/activities with supervision/set-up for 5 minutes within 7 day(s). 7.  Patient will utilize energy conservation techniques during functional activities with verbal cues within 7 day(s). Occupational Therapy TREATMENT  Patient: Kimberly Sylvester (60 y.o. female)  Date: 12/27/2018  Diagnosis: Fall Fall      Precautions: Fall(LUE immobilized in sling)  Chart, occupational therapy assessment, plan of care, and goals were reviewed. ASSESSMENT:  Pt is a pleasant 70 yo F s/p fall with LUE dislocation and bruising to face. AAOx4, focus of session on AROM of L elbow and contracture prevention. Seen supine in bed, pt v/d ability to safely complete LUE exercises to include wrist, MP, DIP, PIP, and elbow flexion/extension. Per documentation and observation pt continues to be CGA to min A for mobility, and mod A for UB dressing and bathing. Pt lives alone and will benefit from rehab placement prior to dc home to prevent further falls and readmission. Acute care OT will continue to follow.    Progression toward goals:  [x]       Improving appropriately and progressing toward goals  []       Improving slowly and progressing toward goals  []       Not making progress toward goals and plan of care will be adjusted     PLAN:  Patient continues to benefit from skilled intervention to address the above impairments. Continue treatment per established plan of care. Discharge Recommendations:  Skilled Nursing Facility  Further Equipment Recommendations for Discharge:  TBD     SUBJECTIVE:   Patient stated I have just been up and gotten all settled.     OBJECTIVE DATA SUMMARY:   Cognitive/Behavioral Status:  Neurologic State: Alert  Orientation Level: Oriented to time  Cognition: Appropriate decision making; Appropriate for age attention/concentration; Appropriate safety awareness; Follows commands        Therapeutic Exercises:   Supine in bed  LUE: Flexion and Extension AROM exercises 10 reps x 2 sets  Elbow  Wrist  MP, DIP, PIP  Instructed to complete 3x daily when in bed with shoulder supported    Activity Tolerance:   Decent activity tolerance.     After treatment:   [] Patient left in no apparent distress sitting up in chair  [x] Patient left in no apparent distress in bed  [x] Call bell left within reach  [x] Nursing notified  [] Caregiver present  [] Bed alarm activated    COMMUNICATION/COLLABORATION:   The patients plan of care was discussed with:     Johana Masterson  Time Calculation: 12 mins

## 2018-12-27 NOTE — PROGRESS NOTES
Bedside and Verbal shift change report given to Leydi Ray RN (oncoming nurse) by Angelica Leiva RN (offgoing nurse). Report included the following information SBAR, Kardex, Procedure Summary, Intake/Output, MAR, Accordion and Recent Results.

## 2018-12-27 NOTE — PROGRESS NOTES
Spiritual Care Partner Volunteer visited patient in room 578 on 12.27.18. Documented by: : Rev. Bert Barragan.  Surjit Reese; [facility], to contact [phone] To [place] call: [phone]

## 2018-12-28 VITALS
SYSTOLIC BLOOD PRESSURE: 117 MMHG | RESPIRATION RATE: 18 BRPM | OXYGEN SATURATION: 98 % | TEMPERATURE: 98 F | DIASTOLIC BLOOD PRESSURE: 62 MMHG | HEART RATE: 69 BPM

## 2018-12-28 LAB
ANION GAP SERPL CALC-SCNC: 7 MMOL/L (ref 5–15)
BACTERIA SPEC CULT: NORMAL
BACTERIA SPEC CULT: NORMAL
BUN SERPL-MCNC: 23 MG/DL (ref 6–20)
BUN/CREAT SERPL: 20 (ref 12–20)
CALCIUM SERPL-MCNC: 8.9 MG/DL (ref 8.5–10.1)
CHLORIDE SERPL-SCNC: 109 MMOL/L (ref 97–108)
CO2 SERPL-SCNC: 23 MMOL/L (ref 21–32)
CREAT SERPL-MCNC: 1.17 MG/DL (ref 0.55–1.02)
GLUCOSE SERPL-MCNC: 126 MG/DL (ref 65–100)
POTASSIUM SERPL-SCNC: 4 MMOL/L (ref 3.5–5.1)
SERVICE CMNT-IMP: NORMAL
SODIUM SERPL-SCNC: 139 MMOL/L (ref 136–145)

## 2018-12-28 PROCEDURE — 36415 COLL VENOUS BLD VENIPUNCTURE: CPT

## 2018-12-28 PROCEDURE — 74011250637 HC RX REV CODE- 250/637: Performed by: INTERNAL MEDICINE

## 2018-12-28 PROCEDURE — 74011250637 HC RX REV CODE- 250/637: Performed by: FAMILY MEDICINE

## 2018-12-28 PROCEDURE — 99218 HC RM OBSERVATION: CPT

## 2018-12-28 PROCEDURE — 80048 BASIC METABOLIC PNL TOTAL CA: CPT

## 2018-12-28 RX ORDER — OXYCODONE HYDROCHLORIDE 5 MG/1
5 TABLET ORAL
Qty: 30 TAB | Refills: 0 | Status: SHIPPED | OUTPATIENT
Start: 2018-12-28 | End: 2019-07-11

## 2018-12-28 RX ADMIN — ACETAMINOPHEN 650 MG: 325 TABLET ORAL at 06:59

## 2018-12-28 RX ADMIN — ARIPIPRAZOLE 10 MG: 10 TABLET ORAL at 09:00

## 2018-12-28 RX ADMIN — PRIMIDONE 100 MG: 50 TABLET ORAL at 08:22

## 2018-12-28 RX ADMIN — DULOXETINE 120 MG: 60 CAPSULE, DELAYED RELEASE ORAL at 08:22

## 2018-12-28 RX ADMIN — ACETAMINOPHEN 650 MG: 325 TABLET ORAL at 11:42

## 2018-12-28 RX ADMIN — HYDRALAZINE HYDROCHLORIDE 25 MG: 25 TABLET, FILM COATED ORAL at 08:22

## 2018-12-28 RX ADMIN — OXYCODONE HYDROCHLORIDE 5 MG: 5 TABLET ORAL at 13:33

## 2018-12-28 RX ADMIN — THERA TABS 1 TABLET: TAB at 08:23

## 2018-12-28 RX ADMIN — CARVEDILOL 12.5 MG: 12.5 TABLET, FILM COATED ORAL at 08:23

## 2018-12-28 RX ADMIN — OXYCODONE HYDROCHLORIDE 5 MG: 5 TABLET ORAL at 06:59

## 2018-12-28 RX ADMIN — PANTOPRAZOLE SODIUM 40 MG: 40 TABLET, DELAYED RELEASE ORAL at 06:59

## 2018-12-28 NOTE — DISCHARGE SUMMARY
Discharge Summary       PATIENT ID: Wes Garcia  MRN: [id number]   YOB: 1940    DATE OF ADMISSION: 12/24/2018 12:36 PM    DATE OF DISCHARGE: 12/28/18  PRIMARY CARE PROVIDER: Ray Washington DO     DISCHARGING PROVIDER: Vi Hernandez MD    To contact this individual call [phone] and ask the  to page. If unavailable ask to be transferred the Adult Hospitalist Department. CONSULTATIONS: ED CONSULT TO SENIOR SERVICES CASE MANAGEMENT  ED CONSULT TO SENIOR SERVICES PHYSICAL THERAPY    PROCEDURES/SURGERIES: * No surgery found *    [address] COURSE:   71-year-old female with past medical history of anemia, CABG, syncope, bradycardia, hypotension, hyperlipidemia, depression who presents to the hospital after a fall.  Patient reports that she got up from her bed to go to the bathroom and had a sudden fall. The patient reports that she did not trip on anything but just \"fell. \"  Patient denies any pre or post-fall loss of consciousness or any other symptomology.  Patient developed a right facial hematoma and was brought to the hospital for further management and evaluation.  The patient reports that she did not have any loss of consciousness, did not have any seizure or seizure-like activity, did not have any chest pain, shortness of breath or any other symptoms.  The patient also complains of some left shoulder pain, was found to have dislocation of the left shoulder and was requested to be admitted under the hospitalist service. Pt found to have left shoulder dislocation. No indication for surgery. Pt recvd pt,ot with rec for snf. Pt medically stable for d/c today.     DISCHARGE DIAGNOSES / PLAN:      Right facial injury with left shoulder anterior dislocation.    -unsteady on ambulation with nursing  -no indication for surgery atthis time  -PT and OT recommending rehab     Fall, most likely mechanical fall.  -patient still unsteady  -to SNF/rehab     Elevated CK, this is not rhabdo. Due to fall. Much improved     History of hypertension:  -Continue antihypertensives     Dehydration, resolved. S/p ivf       PENDING TEST RESULTS:   At the time of discharge the following test results are still pending: none    FOLLOW UP APPOINTMENTS:    Follow-up Information     Follow up With Specialties Details Why Contact Info    Cely Alcala   [address] [facility] Aggie Adame MD Orthopedic Surgery In 3 days  [address]  [facility] [address]   [address] [address]  [phone]    [facility] 112  [phone]             ADDITIONAL CARE RECOMMENDATIONS:     DIET: Cardiac Diet    TUBE FEEDING INSTRUCTIONS: none    OXYGEN / BiPAP SETTINGS: none    ACTIVITY: Activity as tolerated    WOUND CARE: see  D/c instructions     EQUIPMENT needed: none        DISCHARGE MEDICATIONS:  Current Discharge Medication List      START taking these medications    Details   oxyCODONE IR (ROXICODONE) 5 mg immediate release tablet Take 1 Tab by mouth every six (6) hours as needed. Max Daily Amount: 20 mg.  Qty: 30 Tab, Refills: 0    Associated Diagnoses: Dislocation of left shoulder joint, initial encounter         CONTINUE these medications which have NOT CHANGED    Details   cyanocobalamin (VITAMIN B12) 100 mcg tablet Take 100 mcg by mouth daily. vit C,P-Ob-xchib-lutein-zeaxan (PRESERVISION AREDS-2) 026-188-79-1 mg-unit-mg-mg cap capsule Take 1 Cap by mouth two (2) times a day. pravastatin (PRAVACHOL) 40 mg tablet Take 40 mg by mouth nightly. senna-docusate (SENNA WITH DOCUSATE SODIUM) 8.6-50 mg per tablet Take 1 Tab by mouth daily as needed for Constipation. primidone (MYSOLINE) 50 mg tablet Take 2 Tabs by mouth two (2) times a day.   Qty: 120 Tab, Refills: 5      hydrOXYzine HCl (ATARAX) 50 mg tablet Take 50 mg by mouth three (3) times daily. ARIPiprazole (ABILIFY) 10 mg tablet Take 10 mg by mouth daily. HYDRALAZINE HCL (APRESOLINE PO) Take 25 mg by mouth two (2) times a day. methocarbamol (ROBAXIN) 750 mg tablet Take 500 mg by mouth three (3) times daily. carvedilol (COREG) 12.5 mg tablet Take 1 Tab by mouth two (2) times daily (with meals). Qty: 180 Tab, Refills: 3      Cholecalciferol, Vitamin D3, 1,000 unit cap Take 1,000 Units by mouth daily. LACTOBACILLUS RHAMNOSUS GG (CULTURELLE PO) Take 1 Tab by mouth daily. Associated Diagnoses: Paroxysmal atrial fibrillation ([address] Coronary artery disease involving native coronary artery without angina pectoris; Bradycardia; Edema; Type 2 diabetes mellitus without complication (HCC)      pantoprazole (PROTONIX) 40 mg tablet Take 40 mg by mouth Daily (before breakfast). DULoxetine (CYMBALTA) 60 mg capsule Take 120 mg by mouth daily. multivitamins-minerals-lutein (CENTRUM SILVER) tab tablet Take 1 Tab by mouth daily. STOP taking these medications       diazePAM (VALIUM) 2 mg tablet Comments:   Reason for Stopping:         aspirin delayed-release 81 mg tablet Comments:   Reason for Stopping:         zolpidem (AMBIEN) 10 mg tablet Comments:   Reason for Stopping:                 NOTIFY YOUR PHYSICIAN FOR ANY OF THE FOLLOWING:   Fever over 101 degrees for 24 hours. Chest pain, shortness of breath, fever, chills, nausea, vomiting, diarrhea, change in mentation, falling, weakness, bleeding. Severe pain or pain not relieved by medications. Or, any other signs or symptoms that you may have questions about.     DISPOSITION:    Home With:   OT  PT  HH  RN      x Long term SNF/Inpatient Rehab    Independent/assisted living    Hospice    Other:       PATIENT CONDITION AT DISCHARGE:     Functional status    Poor    x Deconditioned     Independent      Cognition     Lucid x Forgetful     Dementia      Catheters/lines (plus indication)    Sue     PICC     PEG    x None      Code status    x Full code     DNR      PHYSICAL EXAMINATION AT DISCHARGE:   Refer to Progress Note      CHRONIC MEDICAL DIAGNOSES:  Problem List as of 12/28/2018 Date Reviewed: 12/24/2018          Codes Class Noted - Resolved    * (Principal) Fall ICD-10-CM: W19. Lawrence Vela  ICD-9-CM: E888.9  12/24/2018 - Present        CKD (chronic kidney disease), stage III ([facility] ICD-10-CM: N18.3  ICD-9-CM: 585.3  7/8/2015 - Present        Edema ICD-10-CM: R60.9  ICD-9-CM: 782.3  5/6/2015 - Present        Diabetes mellitus ([facility] ICD-10-CM: E11.9  ICD-9-CM: 250.00  5/6/2015 - Present        Postoperative anemia due to acute blood loss ICD-10-CM: D62  ICD-9-CM: 285.1  2/14/2015 - Present        S/P CABG (coronary artery bypass graft) ICD-10-CM: Z95.1  ICD-9-CM: V45.81  2/13/2015 - Present        Syncope ICD-10-CM: R55  ICD-9-CM: 780.2  2/9/2015 - Present        Bradycardia ICD-10-CM: R00.1  ICD-9-CM: 427.89  2/9/2015 - Present        MORENO (acute kidney injury) ([facility] ICD-10-CM: N17.9  ICD-9-CM: 584.9  2/9/2015 - Present        Anemia ICD-10-CM: D64.9  ICD-9-CM: 285.9  9/9/2014 - Present        GI bleed ICD-10-CM: K92.2  ICD-9-CM: 578.9  9/9/2014 - Present        AF (atrial fibrillation) (HCC) ICD-10-CM: I48.91  ICD-9-CM: 427.31  6/20/2014 - Present        Hypotension ICD-10-CM: I95.9  ICD-9-CM: 458.9  6/3/2014 - Present        CAD (coronary artery disease) ICD-10-CM: I25.10  ICD-9-CM: 414.00  6/3/2014 - Present    Overview Signed 2/10/2015 11:06 AM by Raquel Dick NP     2007 PCI x2 to LAD with STEPHAN             S/P coronary artery stent placement ICD-10-CM: Z95.5  ICD-9-CM: V45.82  6/3/2014 - Present        Renal failure ICD-10-CM: N19  ICD-9-CM: 544  6/3/2014 - Present        Elevated troponin ICD-10-CM: R74.8  ICD-9-CM: 790.6  6/3/2014 - Present        Pericardial effusion ICD-10-CM: I31.3  ICD-9-CM: 423.9  6/3/2014 - Present Lactic acidosis ICD-10-CM: E87.2  ICD-9-CM: 276.2  6/3/2014 - Present              Greater than 35 minutes were spent with the patient on counseling and coordination of care    Signed:   Esther Keyes MD  12/28/2018  10:43 AM

## 2018-12-28 NOTE — PROGRESS NOTES
TRANSFER - OUT REPORT: 
 
Verbal report given to Vic(name) on Melina Seen  being transferred to [facility]) for routine progression of care Report consisted of patients Situation, Background, Assessment and  
Recommendations(SBAR). Information from the following report(s) SBAR, Kardex, OR Summary, Intake/Output, MAR and Recent Results was reviewed with the receiving nurse. Opportunity for questions and clarification was provided.

## 2018-12-28 NOTE — PROGRESS NOTES
Bedside and Verbal shift change report given to LAMONT Acosta (oncoming nurse) by Connie Small RN (offgoing nurse). Report included the following information SBAR, Kardex, Intake/Output, MAR, Accordion and Recent Results.

## 2018-12-28 NOTE — DISCHARGE INSTRUCTIONS
Discharge SNF/Rehab Instructions/LTAC       PATIENT ID: Earnest Duncan  MRN: [id number]   YOB: 1940    DATE OF ADMISSION: 12/24/2018 12:36 PM    DATE OF DISCHARGE: 12/28/2018    PRIMARY CARE PROVIDER: Sakshi Looney DO       ATTENDING PHYSICIAN: Elda Hensley MD  DISCHARGING PROVIDER: Michel Llanos MD     To contact this individual call [phone] and ask the  to page. If unavailable ask to be transferred the Adult Hospitalist Department. CONSULTATIONS: ED CONSULT TO SENIOR SERVICES CASE MANAGEMENT  ED CONSULT TO SENIOR SERVICES PHYSICAL THERAPY    PROCEDURES/SURGERIES: * No surgery found *    [address] COURSE:   69-year-old female with past medical history of anemia, CABG, syncope, bradycardia, hypotension, hyperlipidemia, depression who presents to the hospital after a fall.  Patient reports that she got up from her bed to go to the bathroom and had a sudden fall. The patient reports that she did not trip on anything but just \"fell. \"  Patient denies any pre or post-fall loss of consciousness or any other symptomology.  Patient developed a right facial hematoma and was brought to the hospital for further management and evaluation.  The patient reports that she did not have any loss of consciousness, did not have any seizure or seizure-like activity, did not have any chest pain, shortness of breath or any other symptoms.  The patient also complains of some left shoulder pain, was found to have dislocation of the left shoulder and was requested to be admitted under the hospitalist service.  Per the ER physician, the patient failed the ambulatory trial and also was unsteady on her feet. Pt found to have left shoulder dislocation. No indication for surgery. Pt recvd pt,ot with rec for snf. Pt medically stable for d/c today.       DISCHARGE DIAGNOSES / PLAN:      Right facial injury with left shoulder anterior dislocation.    -unsteady on ambulation with nursing  -no indication for surgery atthis time  -PT and OT recommending rehab     Fall, most likely mechanical fall.  -patient still unsteady  -to SNF/rehab     Elevated CK, this is not rhabdo. Due to fall. Much improved     History of hypertension:  -Continue antihypertensives     Dehydration, resolved. S/p ivf       1. PENDING TEST RESULTS:   At the time of discharge the following test results are still pending: none    FOLLOW UP APPOINTMENTS:    Follow-up Information     Follow up With Specialties Details Why Contact Marguerite Abdul Cely Clayton   [address] [facility] Sybil Barrientos MD Orthopedic Surgery In 3 days  [address]  [facility] [address]   1314  [facility] [address] [phone]    [facility]   Jeffrey Rama [facility] 112  [phone]             ADDITIONAL CARE RECOMMENDATIONS:     DIET: Cardiac Diet    TUBE FEEDING INSTRUCTIONS: none    OXYGEN / BiPAP SETTINGS: none    ACTIVITY: Activity as tolerated    WOUND CARE: see instructions below    EQUIPMENT needed: none      DISCHARGE MEDICATIONS:   See Medication Reconciliation Form      NOTIFY YOUR PHYSICIAN FOR ANY OF THE FOLLOWING:   Fever over 101 degrees for 24 hours. Chest pain, shortness of breath, fever, chills, nausea, vomiting, diarrhea, change in mentation, falling, weakness, bleeding. Severe pain or pain not relieved by medications. Or, any other signs or symptoms that you may have questions about.     DISPOSITION:    Home With:   OT  PT  HH  RN      x SNF/Inpatient Rehab/LTAC    Independent/assisted living    Hospice    Other:       PATIENT CONDITION AT DISCHARGE:     Functional status    Poor    x Deconditioned     Independent      Cognition     Lucid    x Forgetful     Dementia      Catheters/lines (plus indication)    Sue     PICC     PEG x None      Code status    x Full code     DNR      PHYSICAL EXAMINATION AT DISCHARGE:   Refer to Progress Note      CHRONIC MEDICAL DIAGNOSES:  Problem List as of 12/28/2018 Date Reviewed: 12/24/2018          Codes Class Noted - Resolved    * (Principal) Fall ICD-10-CM: W19. Ainsley Cruz  ICD-9-CM: E888.9  12/24/2018 - Present        CKD (chronic kidney disease), stage III ([facility] ICD-10-CM: N18.3  ICD-9-CM: 585.3  7/8/2015 - Present        Edema ICD-10-CM: R60.9  ICD-9-CM: 782.3  5/6/2015 - Present        Diabetes mellitus ([facility] ICD-10-CM: E11.9  ICD-9-CM: 250.00  5/6/2015 - Present        Postoperative anemia due to acute blood loss ICD-10-CM: D62  ICD-9-CM: 285.1  2/14/2015 - Present        S/P CABG (coronary artery bypass graft) ICD-10-CM: Z95.1  ICD-9-CM: V45.81  2/13/2015 - Present        Syncope ICD-10-CM: R55  ICD-9-CM: 780.2  2/9/2015 - Present        Bradycardia ICD-10-CM: R00.1  ICD-9-CM: 427.89  2/9/2015 - Present        MORENO (acute kidney injury) ([facility] ICD-10-CM: N17.9  ICD-9-CM: 584.9  2/9/2015 - Present        Anemia ICD-10-CM: D64.9  ICD-9-CM: 285.9  9/9/2014 - Present        GI bleed ICD-10-CM: K92.2  ICD-9-CM: 578.9  9/9/2014 - Present        AF (atrial fibrillation) (HCC) ICD-10-CM: I48.91  ICD-9-CM: 427.31  6/20/2014 - Present        Hypotension ICD-10-CM: I95.9  ICD-9-CM: 458.9  6/3/2014 - Present        CAD (coronary artery disease) ICD-10-CM: I25.10  ICD-9-CM: 414.00  6/3/2014 - Present    Overview Signed 2/10/2015 11:06 AM by Danette Howard NP     2007 PCI x2 to LAD with STEPHAN             S/P coronary artery stent placement ICD-10-CM: Z95.5  ICD-9-CM: V45.82  6/3/2014 - Present        Renal failure ICD-10-CM: N19  ICD-9-CM: 888  6/3/2014 - Present        Elevated troponin ICD-10-CM: R74.8  ICD-9-CM: 790.6  6/3/2014 - Present        Pericardial effusion ICD-10-CM: I31.3  ICD-9-CM: 423.9  6/3/2014 - Present        Lactic acidosis ICD-10-CM: E87.2  ICD-9-CM: 276.2  6/3/2014 - Present CDMP Checked:   Yes x     PROBLEM LIST Updated:  Yes x         Signed:   Vi Hernandez MD  12/28/2018  10:41 AM            Head Injury: Care Instructions  Your Care Instructions    Most injuries to the head are minor. Bumps, cuts, and scrapes on the head and face usually heal well and can be treated the same as injuries to other parts of the body. Although it's rare, once in a while a more serious problem shows up after you are home. So it's good to be on the lookout for symptoms for a day or two. Follow-up care is a key part of your treatment and safety. Be sure to make and go to all appointments, and call your doctor if you are having problems. It's also a good idea to know your test results and keep a list of the medicines you take. How can you care for yourself at home? · Follow your doctor's instructions. He or she will tell you if you need someone to watch you closely for the next 24 hours or longer. · Take it easy for the next few days or more if you are not feeling well. · Ask your doctor when it's okay for you to go back to activities like driving a car, riding a bike, or operating machinery. When should you call for help? Call 911 anytime you think you may need emergency care. For example, call if:    · You have a seizure.     · You passed out (lost consciousness).     · You are confused or can't stay awake.    Call your doctor now or seek immediate medical care if:    · You have new or worse vomiting.     · You feel less alert.     · You have new weakness or numbness in any part of your body.    Watch closely for changes in your health, and be sure to contact your doctor if:    · You do not get better as expected.     · You have new symptoms, such as headaches, trouble concentrating, or changes in mood. Where can you learn more? Go to http://suraj-delaney.info/. Enter V404 in the search box to learn more about \"Head Injury: Care Instructions. \"  Current as of: June 4, 2018  Content Version: 11.8  © 2602-5819 [employer]. Care instructions adapted under license by [employer] (which disclaims liability or warranty for this information). If you have questions about a medical condition or this instruction, always ask your healthcare professional. [address] any warranty or liability for your use of this information. Dislocated Shoulder: Care Instructions  Your Care Instructions    When the upper arm comes out of the shoulder socket, it is called a dislocated shoulder. After the doctor puts the shoulder back in place, he or she may put your arm in a sling or shoulder immobilizer. This will keep it from moving. Exercise and physical therapy can help your shoulder get strong and move normally again. It may take up to a year for your shoulder to heal and be free of pain. If your shoulder keeps coming out of place, talk to your doctor about surgery. It can prevent dislocations. You may have had a sedative to help you relax. You may be unsteady after having sedation. It can take a few hours for the medicine's effects to wear off. Common side effects of sedation include nausea, vomiting, and feeling sleepy or tired. The doctor has checked you carefully, but problems can develop later. If you notice any problems or new symptoms, get medical treatment right away. Follow-up care is a key part of your treatment and safety. Be sure to make and go to all appointments, and call your doctor if you are having problems. It's also a good idea to know your test results and keep a list of the medicines you take. How can you care for yourself at home? · If the doctor gave you a sedative:  ? For 24 hours, don't do anything that requires attention to detail. It takes time for the medicine's effects to completely wear off.  ? For your safety, do not drive or operate any machinery that could be dangerous.  Wait until the medicine wears off and you can think clearly and react easily. · If your doctor put your arm in a sling or shoulder immobilizer, wear it as directed. · Take pain medicines exactly as directed. ? If the doctor gave you a prescription medicine for pain, take it as prescribed. ? If you are not taking a prescription pain medicine, ask your doctor if you can take an over-the-counter medicine. · Put ice or a cold pack on your shoulder for 10 to 20 minutes at a time. Try to do this every 1 to 2 hours for the next 3 days (when you are awake). Put a thin cloth between the ice and your skin. · You may use warm packs after the first 3 days for 15 to 20 minutes at a time. This can ease pain. · If your doctor gave you exercises to do at home, do them exactly as your doctor told you. · Do not do anything that makes the pain worse. When should you call for help? Call 911 anytime you think you may need emergency care. For example, call if:    · You have trouble breathing.     · You passed out (lost consciousness).    Call your doctor now or seek immediate medical care if:    · You have new or worse nausea or vomiting.     · You have new or worse pain.     · Your hand or fingers are cool or pale or change color.     · You have tingling, weakness, or numbness in your hand or fingers.    Watch closely for changes in your health, and be sure to contact your doctor if:    · You do not get better as expected. Where can you learn more? Go to http://suraj-delaney.info/. Enter R696 in the search box to learn more about \"Dislocated Shoulder: Care Instructions. \"  Current as of: November 29, 2017  Content Version: 11.8  © 0874-9650 [employer]. Care instructions adapted under license by [employer] (which disclaims liability or warranty for this information).  If you have questions about a medical condition or this instruction, always ask your healthcare professional. [place] [address] any warranty or liability for your use of this information.

## 2018-12-28 NOTE — PROGRESS NOTES
Bedside shift change report given to [address] [place] [address] Nando (oncoming nurse) by Sridevi Vargas (offgoing nurse). Report included the following information SBAR, Kardex, Intake/Output and MAR.

## 2018-12-28 NOTE — PROGRESS NOTES
DANAE spoke with Lucila Roldan with [facility]. They have received the insurance authorization. The patient will discharge today. 1:30pm [facility] time requested. Discharge folder located on the hard chart with report # [id number] and [facility] PCS form. Nursing will need to send completed AVS/Scripts, Kardex, and [place]. Gerry Whitehead notified. The patient is aware of the discharge plan. PINGT 
 
3pm Discharge time confirmed.  TERESA

## 2019-01-15 ENCOUNTER — TELEPHONE (OUTPATIENT)
Dept: CARDIOLOGY CLINIC | Age: 79
End: 2019-01-15

## 2019-01-15 NOTE — TELEPHONE ENCOUNTER
Called # below, but it is not in service. Patient is scheduled for a follow up with Dr. Tobi Alves and carotid dopplers. Carotids are for dx CAD and forgetfullness. Our office should be getting auth for dopplers. If patient needs referral to the office, her PCP would do the referral I believe.     Future Appointments   Date Time Provider Abner Mendiola   1/30/2019 11:00 AM VASCULAR, [place] [address]   1/30/2019 11:40 AM Valeria Fields  [address]   5/10/2019  9:40 AM Manish Loyola MD [address] [place] [address]

## 2019-01-15 NOTE — TELEPHONE ENCOUNTER
Ricco Bean with [facility] is asking for a bit more information on the patients upcoming appointments so she can obtain the auth.     Phone: [phone]

## 2019-01-27 ENCOUNTER — HOSPITAL ENCOUNTER (OUTPATIENT)
Dept: MRI IMAGING | Age: 79
Discharge: HOME | End: 2019-01-27
Attending: ORTHOPAEDIC SURGERY
Payer: MEDICARE

## 2019-01-27 DIAGNOSIS — M25.512 LEFT SHOULDER PAIN: ICD-10-CM

## 2019-01-27 PROCEDURE — 73221 MRI JOINT UPR EXTREM W/O DYE: CPT

## 2019-02-11 ENCOUNTER — TELEPHONE (OUTPATIENT)
Dept: CARDIOLOGY CLINIC | Age: 79
End: 2019-02-11

## 2019-02-11 NOTE — TELEPHONE ENCOUNTER
Patient states she thinks she does not need the vascular test tomorrow. Is this something she can cancel or does the doctor want her to gte this test done? Please let her know asap so she can coordinate her ride.       She can be reached at [phone]

## 2019-02-11 NOTE — TELEPHONE ENCOUNTER
Called patient. Verified patient's identity with two identifiers. Patient stated the physician that wanted her to get carotid dopplers checked her recently and said she was fine and did not need to have carotids. Patient requested I cancel testr and move up appointment with Dr. Santi Corona to [address] [place], but told her Dr. Santi Corona does not start clinic until 1:40. I said she can come at 1:30. Patient agreed. Moved appointment with Dr. Santi Corona from 2 to 1:40 tomorrow. Patient verbalized understanding and denied further questions or concerns.

## 2019-02-12 ENCOUNTER — OFFICE VISIT (OUTPATIENT)
Dept: CARDIOLOGY CLINIC | Age: 79
End: 2019-02-12

## 2019-02-12 VITALS
RESPIRATION RATE: 16 BRPM | DIASTOLIC BLOOD PRESSURE: 80 MMHG | SYSTOLIC BLOOD PRESSURE: 130 MMHG | HEIGHT: 65 IN | WEIGHT: 162 LBS | BODY MASS INDEX: 26.99 KG/M2 | HEART RATE: 64 BPM

## 2019-02-12 DIAGNOSIS — I48.0 PAROXYSMAL ATRIAL FIBRILLATION (HCC): ICD-10-CM

## 2019-02-12 DIAGNOSIS — Z95.1 S/P CABG (CORONARY ARTERY BYPASS GRAFT): ICD-10-CM

## 2019-02-12 DIAGNOSIS — S43.005S SHOULDER DISLOCATION, LEFT, SEQUELA: ICD-10-CM

## 2019-02-12 DIAGNOSIS — Z95.5 S/P CORONARY ARTERY STENT PLACEMENT: ICD-10-CM

## 2019-02-12 DIAGNOSIS — E11.9 TYPE 2 DIABETES MELLITUS WITHOUT COMPLICATION, WITHOUT LONG-TERM CURRENT USE OF INSULIN (HCC): ICD-10-CM

## 2019-02-12 DIAGNOSIS — I25.10 CORONARY ARTERY DISEASE INVOLVING NATIVE CORONARY ARTERY OF NATIVE HEART WITHOUT ANGINA PECTORIS: Primary | ICD-10-CM

## 2019-02-12 RX ORDER — HYDROXYZINE PAMOATE 50 MG/1
25 CAPSULE ORAL
COMMUNITY
End: 2019-07-11

## 2019-02-12 RX ORDER — DIAZEPAM 2 MG/1
2 TABLET ORAL 3 TIMES DAILY
COMMUNITY
End: 2020-10-04

## 2019-02-12 RX ORDER — ZOLPIDEM TARTRATE 10 MG/1
10 TABLET ORAL
Status: ON HOLD | COMMUNITY
End: 2019-07-17 | Stop reason: SDUPTHER

## 2019-02-12 NOTE — PROGRESS NOTES
HISTORY OF PRESENT ILLNESS  Wilver Plummer is a 69 y.o. female. She has a history of coronary disease and underwent bypass surgery about four years ago. She had one episode of atrial fibrillation possibly related to surgery, but has not had recurrence. She has not been on any anticoagulation. In December of last year she tripped and fell and dislocated her left shoulder and also sustained a large bruise to her face. Part of the bruise is still apparent under her left eye today. She saw Dr. Beatris Estrada and an MRI. In order to repair the shoulder completely, she would need shoulder replacement and she is not sure she wants to do this. She cannot lift her left arm above her head. She is not having much in the way of pain. She also has remote history of stenting of coronary arteries back in 2007 following a myocardial infarction. Her last echocardiogram was in May 2015 that showed normal function. HPI  Patient Active Problem List   Diagnosis Code    Hypotension I95.9    CAD (coronary artery disease) I25.10    S/P coronary artery stent placement Z95.5    Renal failure N19    Elevated troponin R74.8    Pericardial effusion I31.3    Lactic acidosis E87.2    AF (atrial fibrillation) ([facility]) I48.91    Anemia D64.9    GI bleed K92.2    Syncope R55    Bradycardia R00.1    MORENO (acute kidney injury) ([facility] N17.9    Postoperative anemia due to acute blood loss D62    S/P CABG (coronary artery bypass graft) Z95.1    Edema R60.9    Diabetes mellitus ([facility]) E11.9    CKD (chronic kidney disease), stage III ([facility]) N18.3    Fall W19. Fuentes Vasquez     Current Outpatient Medications   Medication Sig Dispense Refill    hydrOXYzine pamoate (VISTARIL) 50 mg capsule Take 25 mg by mouth every four to six (4-6) hours as needed for Itching.  diazePAM (VALIUM) 2 mg tablet Take  by mouth every six (6) hours as needed for Anxiety.  zolpidem (AMBIEN) 10 mg tablet Take  by mouth nightly as needed for Sleep.       cyanocobalamin (VITAMIN B12) 100 mcg tablet Take 100 mcg by mouth daily.  vit C,H-Hh-ifvjd-lutein-zeaxan (PRESERVISION AREDS-2) 303-899-34-1 mg-unit-mg-mg cap capsule Take 1 Cap by mouth two (2) times a day.  pravastatin (PRAVACHOL) 40 mg tablet Take 40 mg by mouth nightly.  senna-docusate (SENNA WITH DOCUSATE SODIUM) 8.6-50 mg per tablet Take 1 Tab by mouth daily as needed for Constipation.  primidone (MYSOLINE) 50 mg tablet Take 2 Tabs by mouth two (2) times a day. 120 Tab 5    ARIPiprazole (ABILIFY) 10 mg tablet Take 10 mg by mouth daily.  HYDRALAZINE HCL (APRESOLINE PO) Take 25 mg by mouth two (2) times a day.  methocarbamol (ROBAXIN) 750 mg tablet Take 500 mg by mouth three (3) times daily.  carvedilol (COREG) 12.5 mg tablet Take 1 Tab by mouth two (2) times daily (with meals). 180 Tab 3    Cholecalciferol, Vitamin D3, 1,000 unit cap Take 1,000 Units by mouth daily.  LACTOBACILLUS RHAMNOSUS GG (CULTURELLE PO) Take 1 Tab by mouth daily.  pantoprazole (PROTONIX) 40 mg tablet Take 40 mg by mouth Daily (before breakfast).  DULoxetine (CYMBALTA) 60 mg capsule Take 120 mg by mouth daily.  multivitamins-minerals-lutein (CENTRUM SILVER) tab tablet Take 1 Tab by mouth daily.  oxyCODONE IR (ROXICODONE) 5 mg immediate release tablet Take 1 Tab by mouth every six (6) hours as needed. Max Daily Amount: 20 mg. 30 Tab 0    hydrOXYzine HCl (ATARAX) 50 mg tablet Take 50 mg by mouth three (3) times daily.  VIT A/VIT C/VIT E/ZINC/COPPER (PRESERVISION AREDS PO) Take  by mouth.        Past Medical History:   Diagnosis Date    Anxiety disorder     Atrial fibrillation (HCC)     CAD (coronary artery disease) 2007    stents, CABG x 3v    Chronic kidney disease     Depression     Diabetes ([facility]     High cholesterol     History of peptic ulcer     Bleeding ulcer with increased NSAID use    Hypertension     MI, old 2007    Valvular heart disease      Past Surgical History: Procedure Laterality Date    HX CORONARY ARTERY BYPASS GRAFT      HX ORTHOPAEDIC Right     HX TONSILLECTOMY         Review of Systems   Musculoskeletal: Positive for falls and joint pain. All other systems reviewed and are negative. Visit Vitals  /80 (BP 1 Location: Right arm, BP Patient Position: Sitting)   Pulse 64   Resp 16   Ht 5' 5\" (1.651 m)   Wt 162 lb (73.5 kg)   BMI 26.96 kg/m²       Physical Exam   Constitutional: She appears well-nourished. Eyes: Conjunctivae are normal.   Neck: Neck supple. Cardiovascular: Normal rate, regular rhythm and normal heart sounds. Exam reveals no gallop and no friction rub. No murmur heard. Pulmonary/Chest: Breath sounds normal. She has no wheezes. Abdominal: Bowel sounds are normal.   Musculoskeletal: She exhibits no edema. Neurological: She is alert. No cranial nerve deficit. Skin: Skin is dry. Psychiatric: Her behavior is normal.   Nursing note and vitals reviewed. ASSESSMENT and PLAN  She clearly is not a candidate for anticoagulation given her recent fall although she remains in sinus rhythm to exam and her EKG in December also showed sinus rhythm. She is on Carvedilol 12.5 mg twice daily. Apparently she would need stress testing before shoulder replacement, however, she is undecided about proceeding with surgery. For now I will leave her on this regimen and see her in six months. If she decides to have shoulder replacement, she will call and I will schedule a Lexiscan myoview test to be done.

## 2019-07-11 ENCOUNTER — APPOINTMENT (OUTPATIENT)
Dept: NON INVASIVE DIAGNOSTICS | Age: 79
DRG: 065 | End: 2019-07-11
Attending: PHYSICIAN ASSISTANT
Payer: MEDICARE

## 2019-07-11 ENCOUNTER — APPOINTMENT (OUTPATIENT)
Dept: GENERAL RADIOLOGY | Age: 79
DRG: 065 | End: 2019-07-11
Attending: EMERGENCY MEDICINE
Payer: MEDICARE

## 2019-07-11 ENCOUNTER — HOSPITAL ENCOUNTER (INPATIENT)
Age: 79
LOS: 7 days | Discharge: NURSING, REHAB OR LONG-TERM CARE | DRG: 065 | End: 2019-07-18
Attending: EMERGENCY MEDICINE | Admitting: INTERNAL MEDICINE
Payer: MEDICARE

## 2019-07-11 DIAGNOSIS — R62.7 FAILURE TO THRIVE IN ADULT: Primary | ICD-10-CM

## 2019-07-11 DIAGNOSIS — R77.8 ELEVATED TROPONIN: ICD-10-CM

## 2019-07-11 DIAGNOSIS — R26.2 DIFFICULTY WALKING: ICD-10-CM

## 2019-07-11 DIAGNOSIS — F51.01 PRIMARY INSOMNIA: ICD-10-CM

## 2019-07-11 LAB
ALBUMIN SERPL-MCNC: 3.8 G/DL (ref 3.5–5)
ALBUMIN/GLOB SERPL: 1.2 {RATIO} (ref 1.1–2.2)
ALP SERPL-CCNC: 124 U/L (ref 45–117)
ALT SERPL-CCNC: 26 U/L (ref 12–78)
ANION GAP SERPL CALC-SCNC: 5 MMOL/L (ref 5–15)
APPEARANCE UR: ABNORMAL
AST SERPL-CCNC: 27 U/L (ref 15–37)
ATRIAL RATE: 56 BPM
BACTERIA URNS QL MICRO: NEGATIVE /HPF
BASOPHILS # BLD: 0 K/UL (ref 0–0.1)
BASOPHILS NFR BLD: 1 % (ref 0–1)
BILIRUB SERPL-MCNC: 0.5 MG/DL (ref 0.2–1)
BILIRUB UR QL: NEGATIVE
BNP SERPL-MCNC: 1941 PG/ML
BUN SERPL-MCNC: 12 MG/DL (ref 6–20)
BUN/CREAT SERPL: 10 (ref 12–20)
CALCIUM SERPL-MCNC: 10.1 MG/DL (ref 8.5–10.1)
CALCULATED P AXIS, ECG09: 33 DEGREES
CALCULATED R AXIS, ECG10: -51 DEGREES
CALCULATED T AXIS, ECG11: 154 DEGREES
CHLORIDE SERPL-SCNC: 107 MMOL/L (ref 97–108)
CK MB CFR SERPL CALC: 3.6 % (ref 0–2.5)
CK MB SERPL-MCNC: 2.7 NG/ML (ref 5–25)
CK SERPL-CCNC: 75 U/L (ref 26–192)
CO2 SERPL-SCNC: 27 MMOL/L (ref 21–32)
COLOR UR: ABNORMAL
COMMENT, HOLDF: NORMAL
CREAT SERPL-MCNC: 1.2 MG/DL (ref 0.55–1.02)
DIAGNOSIS, 93000: NORMAL
DIFFERENTIAL METHOD BLD: NORMAL
ECHO AO ROOT DIAM: 3.02 CM
ECHO AV AREA PEAK VELOCITY: 1.7 CM2
ECHO AV AREA VTI: 1.8 CM2
ECHO AV MEAN GRADIENT: 9.2 MMHG
ECHO AV PEAK GRADIENT: 17.4 MMHG
ECHO AV PEAK VELOCITY: 208.64 CM/S
ECHO AV REGURGITANT PHT: 541.3 CM
ECHO AV VTI: 45.79 CM
ECHO LA MAJOR AXIS: 3.84 CM
ECHO LA TO AORTIC ROOT RATIO: 1.27
ECHO LV E' LATERAL VELOCITY: 7.06 CM/S
ECHO LV E' SEPTAL VELOCITY: 3.75 CM/S
ECHO LV INTERNAL DIMENSION DIASTOLIC: 4.69 CM (ref 3.9–5.3)
ECHO LV INTERNAL DIMENSION SYSTOLIC: 3.31 CM
ECHO LV IVSD: 1.26 CM (ref 0.6–0.9)
ECHO LV MASS 2D: 275.4 G (ref 67–162)
ECHO LV POSTERIOR WALL DIASTOLIC: 1.3 CM (ref 0.6–0.9)
ECHO LVOT DIAM: 1.85 CM
ECHO LVOT PEAK GRADIENT: 6.8 MMHG
ECHO LVOT PEAK VELOCITY: 130.58 CM/S
ECHO LVOT SV: 81.5 ML
ECHO LVOT VTI: 30.16 CM
ECHO MV A VELOCITY: 115.35 CM/S
ECHO MV AREA PHT: 3.2 CM2
ECHO MV E DECELERATION TIME (DT): 236.3 MS
ECHO MV E VELOCITY: 68.33 CM/S
ECHO MV E/A RATIO: 0.59
ECHO MV E/E' LATERAL: 9.68
ECHO MV E/E' RATIO (AVERAGED): 13.95
ECHO MV E/E' SEPTAL: 18.22
ECHO MV PRESSURE HALF TIME (PHT): 68.5 MS
ECHO PULMONARY ARTERY SYSTOLIC PRESSURE (PASP): 30 MMHG
ECHO RV TAPSE: 1.92 CM (ref 1.5–2)
ECHO TV REGURGITANT MAX VELOCITY: 252.16 CM/S
ECHO TV REGURGITANT PEAK GRADIENT: 25.4 MMHG
EOSINOPHIL # BLD: 0.1 K/UL (ref 0–0.4)
EOSINOPHIL NFR BLD: 2 % (ref 0–7)
EPITH CASTS URNS QL MICRO: ABNORMAL /LPF
ERYTHROCYTE [DISTWIDTH] IN BLOOD BY AUTOMATED COUNT: 12.6 % (ref 11.5–14.5)
EST. AVERAGE GLUCOSE BLD GHB EST-MCNC: 117 MG/DL
GLOBULIN SER CALC-MCNC: 3.2 G/DL (ref 2–4)
GLUCOSE SERPL-MCNC: 146 MG/DL (ref 65–100)
GLUCOSE UR STRIP.AUTO-MCNC: NEGATIVE MG/DL
HBA1C MFR BLD: 5.7 % (ref 4.2–6.3)
HCT VFR BLD AUTO: 40.2 % (ref 35–47)
HGB BLD-MCNC: 13.1 G/DL (ref 11.5–16)
HGB UR QL STRIP: NEGATIVE
IMM GRANULOCYTES # BLD AUTO: 0 K/UL (ref 0–0.04)
IMM GRANULOCYTES NFR BLD AUTO: 0 % (ref 0–0.5)
KETONES UR QL STRIP.AUTO: NEGATIVE MG/DL
LEUKOCYTE ESTERASE UR QL STRIP.AUTO: ABNORMAL
LYMPHOCYTES # BLD: 1 K/UL (ref 0.8–3.5)
LYMPHOCYTES NFR BLD: 20 % (ref 12–49)
MAGNESIUM SERPL-MCNC: 2 MG/DL (ref 1.6–2.4)
MCH RBC QN AUTO: 30.8 PG (ref 26–34)
MCHC RBC AUTO-ENTMCNC: 32.6 G/DL (ref 30–36.5)
MCV RBC AUTO: 94.6 FL (ref 80–99)
MONOCYTES # BLD: 0.3 K/UL (ref 0–1)
MONOCYTES NFR BLD: 7 % (ref 5–13)
NEUTS SEG # BLD: 3.3 K/UL (ref 1.8–8)
NEUTS SEG NFR BLD: 70 % (ref 32–75)
NITRITE UR QL STRIP.AUTO: NEGATIVE
NRBC # BLD: 0 K/UL (ref 0–0.01)
NRBC BLD-RTO: 0 PER 100 WBC
P-R INTERVAL, ECG05: 156 MS
PH UR STRIP: 6 [PH] (ref 5–8)
PISA AR MAX VEL: 523.84 CM/S
PLATELET # BLD AUTO: 225 K/UL (ref 150–400)
PMV BLD AUTO: 9.3 FL (ref 8.9–12.9)
POTASSIUM SERPL-SCNC: 4.1 MMOL/L (ref 3.5–5.1)
PROT SERPL-MCNC: 7 G/DL (ref 6.4–8.2)
PROT UR STRIP-MCNC: 300 MG/DL
Q-T INTERVAL, ECG07: 398 MS
QRS DURATION, ECG06: 92 MS
QTC CALCULATION (BEZET), ECG08: 384 MS
RBC # BLD AUTO: 4.25 M/UL (ref 3.8–5.2)
RBC #/AREA URNS HPF: ABNORMAL /HPF (ref 0–5)
SAMPLES BEING HELD,HOLD: NORMAL
SODIUM SERPL-SCNC: 139 MMOL/L (ref 136–145)
SP GR UR REFRACTOMETRY: 1.02 (ref 1–1.03)
TROPONIN I SERPL-MCNC: 0.09 NG/ML
TROPONIN I SERPL-MCNC: 0.11 NG/ML
TSH SERPL DL<=0.05 MIU/L-ACNC: 1.6 UIU/ML (ref 0.36–3.74)
UR CULT HOLD, URHOLD: NORMAL
UROBILINOGEN UR QL STRIP.AUTO: 0.2 EU/DL (ref 0.2–1)
VENTRICULAR RATE, ECG03: 56 BPM
WBC # BLD AUTO: 4.7 K/UL (ref 3.6–11)
WBC URNS QL MICRO: ABNORMAL /HPF (ref 0–4)

## 2019-07-11 PROCEDURE — 81001 URINALYSIS AUTO W/SCOPE: CPT

## 2019-07-11 PROCEDURE — 84443 ASSAY THYROID STIM HORMONE: CPT

## 2019-07-11 PROCEDURE — 82550 ASSAY OF CK (CPK): CPT

## 2019-07-11 PROCEDURE — 83735 ASSAY OF MAGNESIUM: CPT

## 2019-07-11 PROCEDURE — 85025 COMPLETE CBC W/AUTO DIFF WBC: CPT

## 2019-07-11 PROCEDURE — 74011250637 HC RX REV CODE- 250/637: Performed by: INTERNAL MEDICINE

## 2019-07-11 PROCEDURE — 84484 ASSAY OF TROPONIN QUANT: CPT

## 2019-07-11 PROCEDURE — 83880 ASSAY OF NATRIURETIC PEPTIDE: CPT

## 2019-07-11 PROCEDURE — 93005 ELECTROCARDIOGRAM TRACING: CPT

## 2019-07-11 PROCEDURE — 93306 TTE W/DOPPLER COMPLETE: CPT

## 2019-07-11 PROCEDURE — 74011250636 HC RX REV CODE- 250/636: Performed by: INTERNAL MEDICINE

## 2019-07-11 PROCEDURE — 36415 COLL VENOUS BLD VENIPUNCTURE: CPT

## 2019-07-11 PROCEDURE — 71046 X-RAY EXAM CHEST 2 VIEWS: CPT

## 2019-07-11 PROCEDURE — 65660000000 HC RM CCU STEPDOWN

## 2019-07-11 PROCEDURE — 80053 COMPREHEN METABOLIC PANEL: CPT

## 2019-07-11 PROCEDURE — 99284 EMERGENCY DEPT VISIT MOD MDM: CPT

## 2019-07-11 PROCEDURE — 83036 HEMOGLOBIN GLYCOSYLATED A1C: CPT

## 2019-07-11 PROCEDURE — 99285 EMERGENCY DEPT VISIT HI MDM: CPT

## 2019-07-11 RX ORDER — SODIUM CHLORIDE 0.9 % (FLUSH) 0.9 %
5-40 SYRINGE (ML) INJECTION AS NEEDED
Status: DISCONTINUED | OUTPATIENT
Start: 2019-07-11 | End: 2019-07-18 | Stop reason: HOSPADM

## 2019-07-11 RX ORDER — PANTOPRAZOLE SODIUM 40 MG/1
40 TABLET, DELAYED RELEASE ORAL
Status: DISCONTINUED | OUTPATIENT
Start: 2019-07-12 | End: 2019-07-18 | Stop reason: HOSPADM

## 2019-07-11 RX ORDER — PRAVASTATIN SODIUM 40 MG/1
40 TABLET ORAL
Status: DISCONTINUED | OUTPATIENT
Start: 2019-07-11 | End: 2019-07-13

## 2019-07-11 RX ORDER — ZOLPIDEM TARTRATE 5 MG/1
10 TABLET ORAL
Status: DISCONTINUED | OUTPATIENT
Start: 2019-07-11 | End: 2019-07-18 | Stop reason: HOSPADM

## 2019-07-11 RX ORDER — DULOXETIN HYDROCHLORIDE 60 MG/1
120 CAPSULE, DELAYED RELEASE ORAL 2 TIMES DAILY
Status: DISCONTINUED | OUTPATIENT
Start: 2019-07-11 | End: 2019-07-18 | Stop reason: HOSPADM

## 2019-07-11 RX ORDER — HYDROXYZINE 25 MG/1
50 TABLET, FILM COATED ORAL EVERY 6 HOURS
Status: DISCONTINUED | OUTPATIENT
Start: 2019-07-11 | End: 2019-07-18 | Stop reason: HOSPADM

## 2019-07-11 RX ORDER — DIAZEPAM 2 MG/1
6 TABLET ORAL
Status: DISCONTINUED | OUTPATIENT
Start: 2019-07-11 | End: 2019-07-18 | Stop reason: HOSPADM

## 2019-07-11 RX ORDER — ACETAMINOPHEN 325 MG/1
650 TABLET ORAL
Status: DISCONTINUED | OUTPATIENT
Start: 2019-07-11 | End: 2019-07-18 | Stop reason: HOSPADM

## 2019-07-11 RX ORDER — SODIUM CHLORIDE 0.9 % (FLUSH) 0.9 %
5-40 SYRINGE (ML) INJECTION EVERY 8 HOURS
Status: DISCONTINUED | OUTPATIENT
Start: 2019-07-11 | End: 2019-07-18 | Stop reason: HOSPADM

## 2019-07-11 RX ORDER — PRIMIDONE 50 MG/1
100 TABLET ORAL 2 TIMES DAILY
Status: DISCONTINUED | OUTPATIENT
Start: 2019-07-11 | End: 2019-07-18 | Stop reason: HOSPADM

## 2019-07-11 RX ORDER — ARIPIPRAZOLE 10 MG/1
10 TABLET ORAL DAILY
Status: DISCONTINUED | OUTPATIENT
Start: 2019-07-12 | End: 2019-07-18 | Stop reason: HOSPADM

## 2019-07-11 RX ORDER — HYDRALAZINE HYDROCHLORIDE 25 MG/1
25 TABLET, FILM COATED ORAL 2 TIMES DAILY
Status: DISCONTINUED | OUTPATIENT
Start: 2019-07-11 | End: 2019-07-15

## 2019-07-11 RX ORDER — CARVEDILOL 12.5 MG/1
12.5 TABLET ORAL 2 TIMES DAILY WITH MEALS
Status: DISCONTINUED | OUTPATIENT
Start: 2019-07-11 | End: 2019-07-18

## 2019-07-11 RX ORDER — AMOXICILLIN 250 MG
1 CAPSULE ORAL
Status: DISCONTINUED | OUTPATIENT
Start: 2019-07-11 | End: 2019-07-18 | Stop reason: HOSPADM

## 2019-07-11 RX ORDER — HEPARIN SODIUM 5000 [USP'U]/ML
5000 INJECTION, SOLUTION INTRAVENOUS; SUBCUTANEOUS EVERY 8 HOURS
Status: DISCONTINUED | OUTPATIENT
Start: 2019-07-11 | End: 2019-07-18 | Stop reason: HOSPADM

## 2019-07-11 RX ADMIN — PRAVASTATIN SODIUM 40 MG: 40 TABLET ORAL at 22:20

## 2019-07-11 RX ADMIN — HYDROXYZINE HYDROCHLORIDE 50 MG: 25 TABLET, FILM COATED ORAL at 17:43

## 2019-07-11 RX ADMIN — HEPARIN SODIUM 5000 UNITS: 5000 INJECTION INTRAVENOUS; SUBCUTANEOUS at 15:27

## 2019-07-11 RX ADMIN — CARVEDILOL 12.5 MG: 12.5 TABLET, FILM COATED ORAL at 17:41

## 2019-07-11 RX ADMIN — Medication 10 ML: at 15:27

## 2019-07-11 RX ADMIN — HYDRALAZINE HYDROCHLORIDE 25 MG: 25 TABLET ORAL at 17:41

## 2019-07-11 RX ADMIN — HYDROXYZINE HYDROCHLORIDE 50 MG: 25 TABLET, FILM COATED ORAL at 22:20

## 2019-07-11 RX ADMIN — PRIMIDONE 100 MG: 50 TABLET ORAL at 17:40

## 2019-07-11 RX ADMIN — Medication 5 ML: at 22:00

## 2019-07-11 RX ADMIN — HEPARIN SODIUM 5000 UNITS: 5000 INJECTION INTRAVENOUS; SUBCUTANEOUS at 22:19

## 2019-07-11 RX ADMIN — DULOXETINE HYDROCHLORIDE 120 MG: 60 CAPSULE, DELAYED RELEASE ORAL at 17:41

## 2019-07-11 NOTE — CONSULTS
Cardiology Consult Note      Patient Name: Trang Moreno  : 1940 MRN: [id number]  Date: 2019  Time: 12:49 PM    ED Diagnosis: Lower leg weakness    Primary Cardiologist: Selwyn Leon M.D. Consulting Cardiologist: Vince Russell MD    Reason for Consult: troponin elevation    Requesting MD: Dr. Shawn Fuentes    I concur with the above note, findings and assessment. /78   Pulse 61   Temp 98.7 °F (37.1 °C)   Resp 16   SpO2 96%   General:    Alert, cooperative, no distress. Psychiatric:    Normal Mood and affect    Eye/ENT:      Pupils equal, No asymmetry, Conjunctival pink. Able to hear voice at normal amplitude   Lungs:      Visibly symmetric chest expansion, No palpable tenderness. Clear to auscultation bilaterally. Heart[de-identified]    Regular rate and rhythm, S1, S2 normal, no click, rub or gallop. No JVD, Normal palpable peripheral pulses. No cyanosis. HSM at apex with radiation to the base. Abdomen:     Soft, non-tender. Bowel sounds normal. No masses,  No      organomegaly. Extremities:   Extremities normal, atraumatic, no edema. Neurologic:   CN II-XII grossly intact. At least 4+ power in both LE. No graded sensory loss. Patient was seen with NP/PA, recent investigations were reviewed and treatment care/plan was formulated together. In summary, patient was admitted to the ED with noncardiac symptoms specifically lower extremity weakness without any definitive signs of congestive heart failure. She has not report any recent angina. She has history of coronary disease status post coronary bypass grafting in  with near complete revascularization. Post bypass she has not had any anginal symptoms. Her post bypass LV systolic function was preserved on her prior echocardiogram.  Her current troponin elevation is marginal at best and is not reflective of clear acute coronary syndrome or myocardial infarction.   At this point of time we do not recommend any changes from cardiac standpoint except echocardiogram.  Assuming echocardiogram shows normal LV systolic function and no other structural abnormalities, she can continue follow-up with Dr. Deysi Castro who may potentially reassess the need for an outpatient stress test based on her symptoms post discharge. Emigdio Kennedy MD   7/11/2019  2:47 PM      HPI:  Trinity Sparrow is a 67 y.o. female evaluated on 7/11/2019  for troponin elevation. She has a past medical history of Anxiety disorder, Atrial fibrillation ([facility] [address] CAD (coronary artery disease) (2007), Chronic kidney disease, Depression, Diabetes ([facility] [address] High cholesterol, History of peptic ulcer, Hypertension, MI, old (2007), and Valvular heart disease. Presents for B/L LE weakness. She is a difficult historian. She notes her leg weakness has been ongoing for the last month. Seen by per PCP for this and prescribed Robaxin. She notes this has not been helpful. She was told her blood flow in her legs was \"not good\", but has had no further workup. She does have fairly frequent GLFs. The last one resulting in left shoulder injury requiring surgery. She does note she has arthritis in her back and shoulders and has had knee replacement for this as well. Cardiac h/o CABG x 3 in 2015, stents placed prior in 2007. Precipitator to CABG was nuclear stress test for syncope and bradycardia. She also has h/o HTN, HLD, DM and PAF. Subjective:  Denies CP or SOB. Only c/o weakness, equally, in both legs. She has a flat affect and slow reactions      Assessment and Plan     1. Troponin elevation    - 0.11 and trending   - Asymptomatic   - EKG without evidence for ACS   - echo ordered. 2. B/L leg weakness   - Noted weak on exam   - H/o LBP and OA - ? Cord compression   - CT neck 2018 demonstrated spondylitic changes causing spinal stenosis  3.  CAD   - h/o stents in 2007   - CABG x 3 in 2015 (LIMA - LAD, SVG - Diag1 - OM1)   - Coreg, Pravachol, ASA   - Echo ordered  4. DMII   - Not on meds  5. CKD   - stage II   - Cr. 1.2  6. Depression   - poly pharmacy - Atarax, Abilify, Cymbalta, Valium   - Also uses Ambien at night for sleep   - Combination of these drugs increases risk for CNS and respiratory depression  7. Essential tremor   - On Mysoline    Asymptomatic troponin elevation, without EKG changes. Not NSTEMI. ProBNP elevated as well, but clear lungs and no edema. Echo ordered, trend second trop. H/o CAD, s/p CABG, but asymptomatic and without EKG changes; ischemic work up can be completed as outpatient with Dr. Joie Sim. C/o B/L leg weakness, I suspect from OA and spinal stenosis. Appropriate to continue all cardiac meds. Poly pharmacy associated with depression treatment and sleep aids. Agree with senior services, she does have falls and lives alone. Will follow up echo and troponin results. Patient Active Problem List   Diagnosis Code    Hypotension I95.9    CAD (coronary artery disease) I25.10    S/P coronary artery stent placement Z95.5    Renal failure N19    Elevated troponin R74.8    Pericardial effusion I31.3    Lactic acidosis E87.2    AF (atrial fibrillation) ([facility] I48.91    Anemia D64.9    GI bleed K92.2    Syncope R55    Bradycardia R00.1    MORENO (acute kidney injury) ([facility] [address] N17.9    Postoperative anemia due to acute blood loss D62    S/P CABG (coronary artery bypass graft) Z95.1    Edema R60.9    Diabetes mellitus (HCC) E11.9    CKD (chronic kidney disease), stage III ([facility]) N18.3    Fall W19. Jaye Benz       - 2007 PCI x2 to LAD with STEPHAN  - TTE 6/6/14 LVEF 55 % to 60 %. No RWMA. Small to mod pericardial effusion identified    circumferential to the heart, no evidence of hemodynamiccompromise. No significant change. From   6/3/14  - NM stress 6/6/14 Mildly reversal focal inferior wall defect concerning for myocardium at ischemic      risk. Normal wall motion with an EF of 65 %.   - TTE 6/3/14 LVEF 55 % to 60 %. No RWMA. LAE. Mod MAC. Mild TR. PASP 45mmHg. A small to      moderate pericardial effusion was identified circumferential to the heart  - NM 2/15 At stress, there is diminished activity in the apex and inferior wall which is mildly improved     at rest suspicious for ischemia. LVEF 70%. - cath 2/11/15 100% distal RCA, PDA fills from left. LAD ostial 70%, mid 99% within prior stents,     distal 90%. Ostial LCX 90%, OM 1 80%. LV normal.    - CABG  2/13/15 - LIMA to LAD, SVG to 1Diag to 1OM. Review of Systems:     GENERAL   Recent weight loss - no   Fever -----------------   no   Chills -----------------   no     EYES, VISION   Visual Changes - no     EARS, NOSE, THROAT   Hearing loss ----------- no   Swallowing difficulties - no     CARDIOVASCULAR   Chest pain/pressure ---- no   Arrhythmia/palpitations - no       RESPIRATORY   Cough ------------------ no   Shortness of breath - no   Wheezing -------------- no   GASTROINTESTINAL   Abdominal pain - no   Heartburn -------- no   Bloody stool ----- no     GENITOURINARY   Frequent urination - no   Urgency -------------- no     MUSCULOSKELETAL   Joint pain/swelling ---- no   Musculoskeletal pain - no     SKIN & INTEGUMENTARY   Rashes - no   Sores --- no         NEUROLOGICAL   Numbness/tingling - no   Sensation loss ------ no     PSYCHIATRIC   Nervousness/anxiety - no   Depression -------------- no     ENDOCRINE   Heat/cold intolerance - no   Excessive thirst -------- no     HEMATOLOGIC/LYMPHATIC   Abnormal bleeding - no     ALL/IMMUN   Allergic reaction ------ no   Recurrent infections - no     Previous treatment/evaluation includes Coronary Artery Bypass Graft, Percutaneous Coronary Intervention and cardiac catheterization .   Cardiac risk factors: dyslipidemia, diabetes mellitus, sedentary life style, hypertension, post-menopausal.    Past Medical History:   Diagnosis Date    Anxiety disorder     Atrial fibrillation (HCC)     CAD (coronary artery disease) 2007    stents, CABG x 3v    Chronic kidney disease     Depression     Diabetes ([facility] [address]     High cholesterol     History of peptic ulcer     Bleeding ulcer with increased NSAID use    Hypertension     MI, old 2007    Valvular heart disease      Past Surgical History:   Procedure Laterality Date    HX CORONARY ARTERY BYPASS GRAFT      HX ORTHOPAEDIC Right     HX TONSILLECTOMY       No current facility-administered medications for this encounter. Current Outpatient Medications   Medication Sig    zolpidem (AMBIEN) 10 mg tablet Take 10 mg by mouth nightly as needed for Sleep.  cyanocobalamin (VITAMIN B12) 100 mcg tablet Take 100 mcg by mouth daily.  pravastatin (PRAVACHOL) 40 mg tablet Take 40 mg by mouth nightly.  primidone (MYSOLINE) 50 mg tablet Take 2 Tabs by mouth two (2) times a day.  hydrOXYzine HCl (ATARAX) 50 mg tablet Take 50 mg by mouth every six (6) hours.  ARIPiprazole (ABILIFY) 10 mg tablet Take 10 mg by mouth daily.  HYDRALAZINE HCL (APRESOLINE PO) Take 25 mg by mouth two (2) times a day.  methocarbamol (ROBAXIN) 750 mg tablet Take 750 mg by mouth three (3) times daily.  carvedilol (COREG) 12.5 mg tablet Take 1 Tab by mouth two (2) times daily (with meals).  Cholecalciferol, Vitamin D3, 1,000 unit cap Take 1,000 Units by mouth daily.  LACTOBACILLUS RHAMNOSUS GG (CULTURELLE PO) Take 1 Tab by mouth daily.  pantoprazole (PROTONIX) 40 mg tablet Take 40 mg by mouth Daily (before breakfast).  DULoxetine (CYMBALTA) 60 mg capsule Take 120 mg by mouth two (2) times a day.  multivitamins-minerals-lutein (CENTRUM SILVER) tab tablet Take 1 Tab by mouth daily.  diazePAM (VALIUM) 2 mg tablet Take  by mouth every six (6) hours as needed for Anxiety.  vit C,L-Uz-brydr-lutein-zeaxan (PRESERVISION AREDS-2) 601-205-36-1 mg-unit-mg-mg cap capsule Take 1 Cap by mouth two (2) times a day.     senna-docusate (SENNA WITH DOCUSATE SODIUM) 8.6-50 mg per tablet Take 1 Tab by mouth daily as needed for Constipation. No Known Allergies   Family History   Problem Relation Age of Onset    Heart Attack Mother 72        Dec 81yo    Hypertension Mother     Other Father         Unknown    Parkinson's Disease Brother       Social History     Socioeconomic History    Marital status: SINGLE     Spouse name: Not on file    Number of children: Not on file    Years of education: Not on file    Highest education level: Not on file   Occupational History    Occupation: Retired realestate/teacher   Tobacco Use    Smoking status: Former Smoker     Types: Cigarettes    Smokeless tobacco: Never Used   Substance and Sexual Activity    Alcohol use: Yes     Alcohol/week: 0.0 oz     Comment: Rare    Drug use: No   Social History Narrative    Lives in [facility] alone       Objective:    Physical Exam    Vitals:   Vitals:    07/11/19 0848 07/11/19 0916   BP:  136/76   Pulse: 60 62   Resp:  16   Temp:  98.7 °F (37.1 °C)   SpO2: 97% 96%       General:    Alert, cooperative, no distress, appears stated age. Flat affect   Neck:   Supple, no carotid bruit and no JVD. Back:     Symmetric, normal curvature. Lungs:     Clear to auscultation bilaterally. Heart[de-identified]    Regular rate and rhythm, S1, S2 normal, no murmur, click, rub or gallop. Abdomen:     Soft, non-tender. Bowel sounds normal.    Extremities:   Extremities normal, atraumatic, no cyanosis or edema. Vascular:   Pulses - 2+ DP/PT and radials   Skin:   Skin color normal. No rashes or lesions   Neurologic:   CN II-XII grossly intact.         Telemetry: normal sinus rhythm    ECG:   EKG Results     Procedure 720 Value Units Date/Time    EKG 12 LEAD INITIAL [[id number]] Collected:  07/11/19 0923    Order Status:  Completed Updated:  07/11/19 1030     Ventricular Rate 56 BPM      Atrial Rate 56 BPM      P-R Interval 156 ms      QRS Duration 92 ms      Q-T Interval 398 ms      QTC Calculation (Bezet) 384 ms Calculated P Axis 33 degrees      Calculated R Axis -51 degrees      Calculated T Axis 154 degrees      Diagnosis --     Sinus bradycardia  Left anterior fascicular block  Left ventricular hypertrophy with repolarization abnormality  When compared with ECG of 24-DEC-2018 12:43,  Vent. rate has decreased BY  48 BPM  QT has shortened  Confirmed by Em Mccullough MD ([id number]) on 7/11/2019 10:30:30 AM              Data Review:     Radiology:   XR Results (most recent):  Results from [place] encounter on 07/11/19   XR CHEST PA LAT    Narrative Indication:  elevated trop     Exam: PA and lateral views of the chest.    Direct comparison is made to prior CXR dated February 2015. Findings: Cardiomediastinal silhouette is stable. Median sternotomy wires are  noted. Lungs are clear bilaterally. Pleural spaces are normal. Osseous  structures are diffusely demineralized, and intact. Impression IMPRESSION: No acute cardiopulmonary disease. Recent Labs     07/11/19  0935   TROIQ 0.11*     Recent Labs     07/11/19  0935      K 4.1      CO2 27   BUN 12   CREA 1.20*   *   CA 10.1     Recent Labs     07/11/19  0935   WBC 4.7   HGB 13.1   HCT 40.2        Recent Labs     07/11/19  0935   SGOT 27   *     No results for input(s): CHOL, LDLC in the last 72 hours. No lab exists for component: TGL, HDLC,  HBA1C  No results for input(s): CRP, TSH, TSHEXT in the last 72 hours. No lab exists for component: ESR    Enrigue Bence.  Lyndsay Mancuso MD         [facility] [address] [place] [address] [address]     [place], [facility] [address] [place] [address], [place], [place]

## 2019-07-11 NOTE — H&P
HISTORY AND PHYSICAL      PCP: Pablo Hanson DO  History source: patient       CC: Bilateral Leg weakness      HPI: A 66y old female patient with PMH of CAD s/ CABG x3, DM, HTN, HLD, CKD, anxiety and chronic left shoulder s/p dislocation presented to Ed for evaluation of bilateral leg weakness. She lives alone. Patient has been having trouble with daily activities for few months now. She uses a cane and walker but recently feels more tired and legs feel weak. hse was seen by her PCP 1 month ago for similar complaints and started on new unknown medication which did not help her. She did have any recent falls but patient is afraid of walking. Hx arthritis present but no worsening in pain. Low appetite. Denied chest pain, abd pain, sob, palpitations, urinary or bowel changes. In ED, work up showed troponin elevated 0.11, cardiology consulted upon request. hospitalist for admission     PMH/PSH:  Past Medical History:   Diagnosis Date    Anxiety disorder     Atrial fibrillation ([facility] [address]     CAD (coronary artery disease) 2007    stents, CABG x 3v    Chronic kidney disease     Depression     Diabetes ([facility] [address]     High cholesterol     History of peptic ulcer     Bleeding ulcer with increased NSAID use    Hypertension     MI, old 2007    Valvular heart disease      Past Surgical History:   Procedure Laterality Date    HX CORONARY ARTERY BYPASS GRAFT      HX ORTHOPAEDIC Right     HX TONSILLECTOMY         Home meds:   Prior to Admission medications    Medication Sig Start Date End Date Taking? Authorizing Provider   zolpidem (AMBIEN) 10 mg tablet Take 10 mg by mouth nightly as needed for Sleep. Yes Provider, Historical   cyanocobalamin (VITAMIN B12) 100 mcg tablet Take 100 mcg by mouth daily. Yes Provider, Historical   pravastatin (PRAVACHOL) 40 mg tablet Take 40 mg by mouth nightly. Yes Provider, Historical   primidone (MYSOLINE) 50 mg tablet Take 2 Tabs by mouth two (2) times a day.  12/4/18  Yes Yessi Escobedo Marjan Guevara MD   hydrOXYzine HCl (ATARAX) 50 mg tablet Take 50 mg by mouth every six (6) hours. Yes Provider, Historical   ARIPiprazole (ABILIFY) 10 mg tablet Take 10 mg by mouth daily. Yes Provider, Historical   HYDRALAZINE HCL (APRESOLINE PO) Take 25 mg by mouth two (2) times a day. Yes Provider, Historical   methocarbamol (ROBAXIN) 750 mg tablet Take 750 mg by mouth three (3) times daily. Yes Provider, Historical   carvedilol (COREG) 12.5 mg tablet Take 1 Tab by mouth two (2) times daily (with meals). 7/25/16  Yes Mehdi Deluca PA-C   Cholecalciferol, Vitamin D3, 1,000 unit cap Take 1,000 Units by mouth daily. Yes Provider, Historical   LACTOBACILLUS RHAMNOSUS GG (CULTURELLE PO) Take 1 Tab by mouth daily. Yes Provider, Historical   pantoprazole (PROTONIX) 40 mg tablet Take 40 mg by mouth Daily (before breakfast). Yes Provider, Historical   DULoxetine (CYMBALTA) 60 mg capsule Take 120 mg by mouth two (2) times a day. Yes Provider, Historical   multivitamins-minerals-lutein (CENTRUM SILVER) tab tablet Take 1 Tab by mouth daily. Yes Provider, Historical   diazePAM (VALIUM) 2 mg tablet Take  by mouth every six (6) hours as needed for Anxiety. Provider, Historical   vit C,V-Tz-kpbkp-lutein-zeaxan (PRESERVISION AREDS-2) 473-576-97-1 mg-unit-mg-mg cap capsule Take 1 Cap by mouth two (2) times a day. Provider, Historical   senna-docusate (SENNA WITH DOCUSATE SODIUM) 8.6-50 mg per tablet Take 1 Tab by mouth daily as needed for Constipation. Provider, Historical       Allergies:  No Known Allergies    FH:  Family History   Problem Relation Age of Onset    Heart Attack Mother 72        Dec 79yo    Hypertension Mother     Other Father         Unknown    Parkinson's Disease Brother        SH:  Social History     Tobacco Use    Smoking status: Former Smoker     Types: Cigarettes    Smokeless tobacco: Never Used   Substance Use Topics    Alcohol use:  Yes     Alcohol/week: 0.0 oz     Comment: Rare       ROS: A comprehensive review of systems was negative. PHYSICAL EXAM:  Visit Vitals  /76   Pulse 62   Temp 98.7 °F (37.1 °C)   Resp 16   SpO2 96%       Gen: NAD, elderly  HEENT: anicteric sclerae, normal conjunctiva, oropharynx clear, MM moist  Neck: supple, trachea midline, no adenopathy  Heart: RRR, no MRG, no JVD, no peripheral edema  Lungs: CTA b/l  Abd: soft, NT, ND, BS+, no organomegaly  Extr: warm  Skin: dry, no rash  Neuro: CN II-XII grossly intact, normal speech, moves all extremities  Psych: normal mood, appropriate affect        Labs/Imaging:  Recent Results (from the past 24 hour(s))   EKG, 12 LEAD, INITIAL    Collection Time: 07/11/19  9:23 AM   Result Value Ref Range    Ventricular Rate 56 BPM    Atrial Rate 56 BPM    P-R Interval 156 ms    QRS Duration 92 ms    Q-T Interval 398 ms    QTC Calculation (Bezet) 384 ms    Calculated P Axis 33 degrees    Calculated R Axis -51 degrees    Calculated T Axis 154 degrees    Diagnosis       Sinus bradycardia  Left anterior fascicular block  Left ventricular hypertrophy with repolarization abnormality  When compared with ECG of 24-DEC-2018 12:43,  Vent. rate has decreased BY  48 BPM  QT has shortened  Confirmed by Svitlana Bloom MD ([id number]) on 7/11/2019 10:30:30 AM     SAMPLES BEING HELD    Collection Time: 07/11/19  9:35 AM   Result Value Ref Range    SAMPLES BEING HELD 1RD,1BLU     COMMENT        Add-on orders for these samples will be processed based on acceptable specimen integrity and analyte stability, which may vary by analyte.    CBC WITH AUTOMATED DIFF    Collection Time: 07/11/19  9:35 AM   Result Value Ref Range    WBC 4.7 3.6 - 11.0 K/uL    RBC 4.25 3.80 - 5.20 M/uL    HGB 13.1 11.5 - 16.0 g/dL    HCT 40.2 35.0 - 47.0 %    MCV 94.6 80.0 - 99.0 FL    MCH 30.8 26.0 - 34.0 PG    MCHC 32.6 30.0 - 36.5 g/dL    RDW 12.6 11.5 - 14.5 %    PLATELET 694 557 - 640 K/uL    MPV 9.3 8.9 - 12.9 FL    NRBC 0.0 0  WBC    ABSOLUTE NRBC 0.00 0.00 - 0.01 K/uL    NEUTROPHILS 70 32 - 75 %    LYMPHOCYTES 20 12 - 49 %    MONOCYTES 7 5 - 13 %    EOSINOPHILS 2 0 - 7 %    BASOPHILS 1 0 - 1 %    IMMATURE GRANULOCYTES 0 0.0 - 0.5 %    ABS. NEUTROPHILS 3.3 1.8 - 8.0 K/UL    ABS. LYMPHOCYTES 1.0 0.8 - 3.5 K/UL    ABS. MONOCYTES 0.3 0.0 - 1.0 K/UL    ABS. EOSINOPHILS 0.1 0.0 - 0.4 K/UL    ABS. BASOPHILS 0.0 0.0 - 0.1 K/UL    ABS. IMM. GRANS. 0.0 0.00 - 0.04 K/UL    DF AUTOMATED     METABOLIC PANEL, COMPREHENSIVE    Collection Time: 07/11/19  9:35 AM   Result Value Ref Range    Sodium 139 136 - 145 mmol/L    Potassium 4.1 3.5 - 5.1 mmol/L    Chloride 107 97 - 108 mmol/L    CO2 27 21 - 32 mmol/L    Anion gap 5 5 - 15 mmol/L    Glucose 146 (H) 65 - 100 mg/dL    BUN 12 6 - 20 MG/DL    Creatinine 1.20 (H) 0.55 - 1.02 MG/DL    BUN/Creatinine ratio 10 (L) 12 - 20      GFR est AA 53 (L) >60 ml/min/1.73m2    GFR est non-AA 43 (L) >60 ml/min/1.73m2    Calcium 10.1 8.5 - 10.1 MG/DL    Bilirubin, total 0.5 0.2 - 1.0 MG/DL    ALT (SGPT) 26 12 - 78 U/L    AST (SGOT) 27 15 - 37 U/L    Alk.  phosphatase 124 (H) 45 - 117 U/L    Protein, total 7.0 6.4 - 8.2 g/dL    Albumin 3.8 3.5 - 5.0 g/dL    Globulin 3.2 2.0 - 4.0 g/dL    A-G Ratio 1.2 1.1 - 2.2     TROPONIN I    Collection Time: 07/11/19  9:35 AM   Result Value Ref Range    Troponin-I, Qt. 0.11 (H) <0.05 ng/mL   MAGNESIUM    Collection Time: 07/11/19  9:35 AM   Result Value Ref Range    Magnesium 2.0 1.6 - 2.4 mg/dL   NT-PRO BNP    Collection Time: 07/11/19  9:35 AM   Result Value Ref Range    NT pro-BNP 1,941 (H) <450 PG/ML   URINALYSIS W/MICROSCOPIC    Collection Time: 07/11/19 10:17 AM   Result Value Ref Range    Color YELLOW/STRAW      Appearance CLOUDY (A) CLEAR      Specific gravity 1.022 1.003 - 1.030      pH (UA) 6.0 5.0 - 8.0      Protein 300 (A) NEG mg/dL    Glucose NEGATIVE  NEG mg/dL    Ketone NEGATIVE  NEG mg/dL    Bilirubin NEGATIVE  NEG      Blood NEGATIVE  NEG      Urobilinogen 0.2 0.2 - 1.0 EU/dL    Nitrites NEGATIVE NEG      Leukocyte Esterase SMALL (A) NEG      WBC 0-4 0 - 4 /hpf    RBC 5-10 0 - 5 /hpf    Epithelial cells FEW FEW /lpf    Bacteria NEGATIVE  NEG /hpf   URINE CULTURE HOLD SAMPLE    Collection Time: 07/11/19 10:17 AM   Result Value Ref Range    Urine culture hold        URINE ON HOLD IN MICROBIOLOGY DEPT FOR 3 DAYS. IF UNPRESERVED URINE IS SUBMITTED, IT CANNOT BE USED FOR ADDITIONAL TESTING AFTER 24 HRS, RECOLLECTION WILL BE REQUIRED. Recent Labs     07/11/19  0935   WBC 4.7   HGB 13.1   HCT 40.2        Recent Labs     07/11/19  0935      K 4.1      CO2 27   BUN 12   CREA 1.20*   *   CA 10.1   MG 2.0     Recent Labs     07/11/19  0935   SGOT 27   ALT 26   *   TBILI 0.5   TP 7.0   ALB 3.8   GLOB 3.2       Recent Labs     07/11/19  0935   TROIQ 0.11*       No results for input(s): INR, PTP, APTT in the last 72 hours. No lab exists for component: INREXT     No results for input(s): PH, PCO2, PO2 in the last 72 hours. EKG: Sinus bradycardia   Left anterior fascicular block       Assessment & Plan:   Elevated Troponin  - admit to inpatient  - tele monitoring  - Troponin 0.11, trend troponin  - pro BNP 1940  - CXR: No acute cardiopulmonary disease  - no chest pain or leg edema   - EKG- stable, no ST- T changes  - cardiology on board  - echo ordered     Hx CAD s/p CABG  - restarted home meds coreg, statin    Bilateral leg weakness/ gait instability/ failure to thrive  - PT/OT  - dietician consult  - Case management    HTN- BP stable.  C/w coreg, hydralzine  GERD - protonix  CKD- cr 1.2 , near baseline, will monitor     Left shoulder pain- chronic    Anxiety - c/w home meds  Abilify, Cymbalta, hydroxyzine     Baseline mobility - ambulates with walker      DVT ppx: heparin  Code status: Full - patient has no living will  Disposition: TBD    Signed By: Prachi Ewing MD     July 11, 2019

## 2019-07-11 NOTE — PROGRESS NOTES
Admission Medication Reconciliation:    Information obtained from: patient    Significant PMH/Disease States:   Past Medical History:   Diagnosis Date    Anxiety disorder     Atrial fibrillation (HCC)     CAD (coronary artery disease) 2007    stents, CABG x 3v    Chronic kidney disease     Depression     Diabetes (HCC)     High cholesterol     History of peptic ulcer     Bleeding ulcer with increased NSAID use    Hypertension     MI, old 2007    Valvular heart disease        Chief Complaint for this Admission:  leg weakness    Allergies:  Patient has no known allergies. Prior to Admission Medications:   Prior to Admission Medications   Prescriptions Last Dose Informant Patient Reported? Taking? ARIPiprazole (ABILIFY) 10 mg tablet 7/11/2019 at Unknown time  Yes Yes   Sig: Take 10 mg by mouth daily. Cholecalciferol, Vitamin D3, 1,000 unit cap 7/11/2019 at Unknown time  Yes Yes   Sig: Take 1,000 Units by mouth daily. DULoxetine (CYMBALTA) 60 mg capsule 7/11/2019 at Unknown time  Yes Yes   Sig: Take 120 mg by mouth two (2) times a day. HYDRALAZINE HCL (APRESOLINE PO) 7/11/2019 at Unknown time  Yes Yes   Sig: Take 25 mg by mouth two (2) times a day. LACTOBACILLUS RHAMNOSUS GG (CULTURELLE PO) 7/11/2019 at Unknown time  Yes Yes   Sig: Take 1 Tab by mouth daily. carvedilol (COREG) 12.5 mg tablet 7/11/2019 at Unknown time  No Yes   Sig: Take 1 Tab by mouth two (2) times daily (with meals). cyanocobalamin (VITAMIN B12) 100 mcg tablet 7/11/2019 at Unknown time  Yes Yes   Sig: Take 100 mcg by mouth daily. diazePAM (VALIUM) 2 mg tablet   Yes No   Sig: Take  by mouth every six (6) hours as needed for Anxiety. hydrOXYzine HCl (ATARAX) 50 mg tablet 7/11/2019 at Unknown time  Yes Yes   Sig: Take 50 mg by mouth every six (6) hours. methocarbamol (ROBAXIN) 750 mg tablet 7/11/2019 at Unknown time  Yes Yes   Sig: Take 750 mg by mouth three (3) times daily.    multivitamins-minerals-lutein (CENTRUM SILVER) tab tablet 7/11/2019 at Unknown time  Yes Yes   Sig: Take 1 Tab by mouth daily. pantoprazole (PROTONIX) 40 mg tablet 7/11/2019 at Unknown time  Yes Yes   Sig: Take 40 mg by mouth Daily (before breakfast). pravastatin (PRAVACHOL) 40 mg tablet 7/10/2019 at Unknown time  Yes Yes   Sig: Take 40 mg by mouth nightly. primidone (MYSOLINE) 50 mg tablet 7/11/2019 at Unknown time  No Yes   Sig: Take 2 Tabs by mouth two (2) times a day. senna-docusate (SENNA WITH DOCUSATE SODIUM) 8.6-50 mg per tablet   Yes No   Sig: Take 1 Tab by mouth daily as needed for Constipation. vit C,X-Rt-xbbib-lutein-zeaxan (PRESERVISION AREDS-2) 116-962-54-1 mg-unit-mg-mg cap capsule   Yes No   Sig: Take 1 Cap by mouth two (2) times a day. zolpidem (AMBIEN) 10 mg tablet 7/4/2019 at Unknown time  Yes Yes   Sig: Take 10 mg by mouth nightly as needed for Sleep. Facility-Administered Medications: None       Comments/Recommendations: Medication review done with patient and her medication list.  Clarified duloxetine to 60mg bid  Changed hydroxyzine from 50mg tid. Removed duplicate hydroxyzine and oxycodone. Regular morning medications taken today. Allergies reviewed.

## 2019-07-11 NOTE — PROGRESS NOTES
[facility] Pharmacy Consult:    Recommendations:  BEERS/STOPP-Start criteria reviewed, labs reviewed  --Indication unclear for primidone. No dx of epilepsy. --Full dose PPI not recommended for long term prophylaxis. Patient has a dx of gi bleed following NSAID use. Currently not using NSAID. Consider DC or use lowest dose if even needed. Caution with sedation with aripiprazole, hydralazine, diazepam, methocarbamol, duloxetine, carvedilol, zolpidem      Medication list obtained from: patient    Significant PMH/Disease States:   Past Medical History:   Diagnosis Date    Anxiety disorder     Atrial fibrillation (HCC)     CAD (coronary artery disease) 2007    stents, CABG x 3v    Chronic kidney disease     Depression     Diabetes ([facility] [address]     High cholesterol     History of peptic ulcer     Bleeding ulcer with increased NSAID use    Hypertension     MI, old 2007    Valvular heart disease        Chief Complaint for this Admission:  leg weakness    Allergies:  Patient has no known allergies. Prior to Admission Medications:   Prior to Admission Medications   Prescriptions Last Dose Informant Patient Reported? Taking? ARIPiprazole (ABILIFY) 10 mg tablet 7/11/2019 at Unknown time  Yes Yes   Sig: Take 10 mg by mouth daily. Indications: Additional Medications to Treat Depression   Cholecalciferol, Vitamin D3, 1,000 unit cap 7/11/2019 at Unknown time  Yes Yes   Sig: Take 1,000 Units by mouth daily. DULoxetine (CYMBALTA) 60 mg capsule 7/11/2019 at Unknown time  Yes Yes   Sig: Take 120 mg by mouth two (2) times a day. Indications: Anxiousness associated with Depression   HYDRALAZINE HCL (APRESOLINE PO) 7/11/2019 at Unknown time  Yes Yes   Sig: Take 25 mg by mouth two (2) times a day. Indications: hypertension   LACTOBACILLUS RHAMNOSUS GG (CULTURELLE PO) 7/11/2019 at Unknown time  Yes Yes   Sig: Take 1 Tab by mouth daily.    carvedilol (COREG) 12.5 mg tablet 7/11/2019 at Unknown time  No Yes   Sig: Take 1 Tab by mouth two (2) times daily (with meals). Patient taking differently: Take 12.5 mg by mouth two (2) times daily (with meals). Indications: Dysfunction of Left Ventricle of Heart following Heart Attack, Ventricular Rate Control in Atrial Fibrillation   cyanocobalamin (VITAMIN B12) 100 mcg tablet 7/11/2019 at Unknown time  Yes Yes   Sig: Take 100 mcg by mouth daily. diazePAM (VALIUM) 2 mg tablet   Yes No   Sig: Take  by mouth every six (6) hours as needed for Anxiety. Indications: anxious   hydrOXYzine HCl (ATARAX) 50 mg tablet 7/11/2019 at Unknown time  Yes Yes   Sig: Take 50 mg by mouth every six (6) hours. methocarbamol (ROBAXIN) 750 mg tablet 7/11/2019 at Unknown time  Yes Yes   Sig: Take 750 mg by mouth three (3) times daily. Indications: muscle spasm   multivitamins-minerals-lutein (CENTRUM SILVER) tab tablet 7/11/2019 at Unknown time  Yes Yes   Sig: Take 1 Tab by mouth daily. pantoprazole (PROTONIX) 40 mg tablet 7/11/2019 at Unknown time  Yes Yes   Sig: Take 40 mg by mouth Daily (before breakfast). Indications: h/o bleeding ulcer with nsaid   pravastatin (PRAVACHOL) 40 mg tablet 7/10/2019 at Unknown time  Yes Yes   Sig: Take 40 mg by mouth nightly. Indications: high cholesterol   primidone (MYSOLINE) 50 mg tablet 7/11/2019 at Unknown time  No Yes   Sig: Take 2 Tabs by mouth two (2) times a day. senna-docusate (SENNA WITH DOCUSATE SODIUM) 8.6-50 mg per tablet   Yes No   Sig: Take 1 Tab by mouth daily as needed for Constipation. vit C,W-Qx-quqsf-lutein-zeaxan (PRESERVISION AREDS-2) [id number] mg-unit-mg-mg cap capsule   Yes No   Sig: Take 1 Cap by mouth two (2) times a day. zolpidem (AMBIEN) 10 mg tablet 7/4/2019 at Unknown time  Yes Yes   Sig: Take 10 mg by mouth nightly as needed for Sleep.       Facility-Administered Medications: None           Piter Regalado, [facility]

## 2019-07-11 NOTE — PROGRESS NOTES
TRANSFER - IN REPORT:    Verbal report received from Philip Sidhu, [facility] (name) who received report from ED nurse on Indu Garcia  being received from ED (unit) for routine progression of care      Report consisted of patients Situation, Background, Assessment and Recommendations(SBAR). Information from the following report(s) SBAR, Kardex, ED Summary, MAR, Accordion, Recent Results, Med Rec Status and Cardiac Rhythm Sinus Garret Bethel was reviewed with the receiving nurse. Opportunity for questions and clarification was provided. Assessment completed upon patients arrival to unit and care assumed.

## 2019-07-11 NOTE — ED NOTES
Pt reports that she is usually able to get up and walk. Usually ambulatory. Reports low back pain, bilateral leg weakness, difficulty walking. ? Urinary retention.   Pt weak on exam.      JESENIA Fitzgerald  9:14 AM

## 2019-07-11 NOTE — ED TRIAGE NOTES
Patient arrives from private residence via EMS with cc of bilateral knee pain and \"calf weakness\". Patient reports being prescribed something by her PCP, Hetal Barksdale MD, but patient is unable to recall what it is. Patient ambulatory with slow, steady gait per EMS. Patient has extensive psych hx per EMS.

## 2019-07-11 NOTE — PROGRESS NOTES
[facility] physical therapy consult received. Dr Misty Milton requested physical therapy defer evaluation at this time d/t troponin 0.11, likely plan is for admission. SSED PT order complete. Please consult for IP physical therapy when appropriate for evaluation. Thank you.

## 2019-07-11 NOTE — ED PROVIDER NOTES
67 y.o. female with past medical history significant for Hypertension, Diabetes, CABG x3, High cholesterol, A-fib, CKD, Depression, MI, Anxiety disorder who presents via EMS from private residence with chief complaint of weakness. Patient reports gradual onset of bilateral lower extremity weakness over the past 3-4 months that has progressively worsened in nature since onset. Patient states she is unable to ambulate due to bilateral leg weakness as she states she has been unsuccessful in ambulating with a cane or walker. Per EMS, upon arrival to the patient's home she was had \"slow and steady gait. \" Patient presents to [facility] ED today with persisting bilateral leg weakness as she states her \"calves are not working right\" causing her gait difficulty. Patient was seen by her PCP ~a month ago for leg weakness and gait difficulty who said her \"blood veins were not operating right\" and was placed on an unknown medication that has not been helping. Patient endorses previous GLFs a few months ago, but denies recent GLF. Patient has h/o right knee replacement, and arthritis in the left knee. Patient lives alone at private residence. Patient denies h/o stroke. Pt denies fever, chills, cough, congestion, shortness of breath, chest pain, abdominal pain, nausea, vomiting, diarrhea, difficulty with urination or dysuria. There are no other acute medical concerns at this time. PCP: Frankie Castaneda DO    Note written by Brenda Murpyh, as dictated by Geovanna Suresh DO 10:02 AM      The history is provided by the patient and the EMS personnel.         Past Medical History:   Diagnosis Date    Anxiety disorder     Atrial fibrillation (HCC)     CAD (coronary artery disease) 2007    stents, CABG x 3v    Chronic kidney disease     Depression     Diabetes ([facility] [address]     High cholesterol     History of peptic ulcer     Bleeding ulcer with increased NSAID use    Hypertension     MI, old 2007    Valvular heart disease Past Surgical History:   Procedure Laterality Date    HX CORONARY ARTERY BYPASS GRAFT      HX ORTHOPAEDIC Right     HX TONSILLECTOMY           Family History:   Problem Relation Age of Onset    Heart Attack Mother 72        Dec 81yo    Hypertension Mother     Other Father         Unknown    Parkinson's Disease Brother        Social History     Socioeconomic History    Marital status: SINGLE     Spouse name: Not on file    Number of children: Not on file    Years of education: Not on file    Highest education level: Not on file   Occupational History    Occupation: Retired realestate/teacher   Social Needs    Financial resource strain: Not on file    Food insecurity:     Worry: Not on file     Inability: Not on file   [employer] needs:     Medical: Not on file     Non-medical: Not on file   Tobacco Use    Smoking status: Former Smoker     Types: Cigarettes    Smokeless tobacco: Never Used   Substance and Sexual Activity    Alcohol use: Yes     Alcohol/week: 0.0 oz     Comment: Rare    Drug use: No    Sexual activity: Not on file   Lifestyle    Physical activity:     Days per week: Not on file     Minutes per session: Not on file    Stress: Not on file   Relationships    Social connections:     Talks on phone: Not on file     Gets together: Not on file     Attends Anabaptist service: Not on file     Active member of club or organization: Not on file     Attends meetings of clubs or organizations: Not on file     Relationship status: Not on file    Intimate partner violence:     Fear of current or ex partner: Not on file     Emotionally abused: Not on file     Physically abused: Not on file     Forced sexual activity: Not on file   Other Topics Concern    Not on file   Social History Narrative    Lives in [facility] alone         ALLERGIES: Patient has no known allergies. Review of Systems   Constitutional: Negative for activity change, appetite change, chills and fever.    HENT: Negative for congestion, rhinorrhea, sinus pressure, sneezing and sore throat. Eyes: Negative for photophobia and visual disturbance. Respiratory: Negative for cough and shortness of breath. Cardiovascular: Negative for chest pain. Gastrointestinal: Negative for abdominal pain, blood in stool, constipation, diarrhea, nausea and vomiting. Genitourinary: Negative for difficulty urinating, dysuria, flank pain, frequency, hematuria, menstrual problem, urgency, vaginal bleeding and vaginal discharge. Musculoskeletal: Positive for gait problem. Negative for arthralgias, back pain, myalgias and neck pain. Skin: Negative for rash and wound. Neurological: Positive for weakness. Negative for syncope, numbness and headaches. Psychiatric/Behavioral: Negative for self-injury and suicidal ideas. All other systems reviewed and are negative. Vitals:    07/11/19 0848 07/11/19 0916   BP:  136/76   Pulse: 60 62   Resp:  16   Temp:  98.7 °F (37.1 °C)   SpO2: 97% 96%            Physical Exam   Constitutional: She is oriented to person, place, and time. She appears well-nourished. No distress. Frail appearing, pleasant   HENT:   Head: Normocephalic and atraumatic. Eyes: Pupils are equal, round, and reactive to light. Conjunctivae and EOM are normal.   Neck: Neck supple. Cardiovascular: Normal rate, regular rhythm and normal heart sounds. Distal pulses 2+   Pulmonary/Chest: Effort normal and breath sounds normal.   Abdominal: Soft. She exhibits no distension. There is no tenderness. Musculoskeletal: She exhibits no edema or tenderness. Full ROM   Neurological: She is alert and oriented to person, place, and time. No cranial nerve deficit. GCS eye subscore is 4. GCS verbal subscore is 5. GCS motor subscore is 6.   4/5 strength in bilateral lower extremities. 5/5 strength in bilateral upper extremities. Intact sensation and coordination. Skin: Skin is warm and dry. She is not diaphoretic.    Right knee surgical scar from prior knee replacement   Nursing note and vitals reviewed. Note written by Brenda Weiss, as dictated by Stuart Pappas DO 9:58 AM       MDM    67 y.o. female presents with failure to thrive and difficulty walking. Hx of CAD. Labs significant for trop 0.11, Cr 1.2, BNP 1941. Otherwise reassuring. CXR viewed by myself and read by radiology showing no acute abnormalities. Cardiology consulted and saw the patient at the bedside. Feel that the patient would benefit from further evaluation, PT/OT and placement. Do not feel that she is safe for discharge home. Procedures  ED EKG interpretation:  Rhythm: sinus bradycardia; and regular . Rate (approx.): 56 bpm; ST/T wave: Lateral T wave inversion without acute ST elevation or depression. LVH. Appears similar to previous EKG. Note written by Brenda Weiss, as dictated by Stuart Pappas DO 9:23 AM    PROGRESS NOTE:  10:51 AM Patient's troponin came back positive. Asked if patient was having chest pain, but pt denied chest pain or SOB. Will get CXR and BNP to further evaluate. PROGRESS NOTE:  11:15 AM Reviewing lab results, imaging, and care plan with patient. Patient expressed understanding and agrees to admission. Hospitalist Dawna for Admission  11:20 AM    ED Room Number: FI48/33  Patient Name and age:  Norberto Stanford 67 y.o.  female  Working Diagnosis:   1. Failure to thrive in adult    2. Elevated troponin    3. Difficulty walking      Readmission: no  Isolation Requirements:  no  Recommended Level of Care:  telemetry  Code Status:  full  Other:  Vague history of several months of progressively worsening difficulty with ambulation, now states that she is unable to walk. No falls, or trauma. No chest pain, or SOB. Hx of CAD with stents. Troponin positive at 0.11.  EKG shows LVH, but no acute ischemic changes

## 2019-07-11 NOTE — ED NOTES
C/o leg weakness x months but today she was going to go to the grocery store \"but I didn't know how I was going to walk in there\"

## 2019-07-11 NOTE — PROGRESS NOTES
NUTRITION COMPLETE ASSESSMENT    RECOMMENDATIONS:   1. Continue diet as ordered (may need to liberalize to Regular, 2gm Sodium if intake does not improve)  -- Suspect pt will eat well in the hospital as meals are prepared for her    2. Weekly weights (standing scale)    Interventions/Plan:   Food/Nutrient Delivery:  Ensure Enlive chocolate      Assessment:   Reason for Assessment:   [x] Provider Consult  [x]BPA/MST Referral     Diet:   Cardiac Regular  Nutritionally Significant Medications: [x] Reviewed  Meal Intake: No data found. Pre-Hospitalization:  Usual Appetite: Fair  Diet at Home: REgular  Vitamins/Supplements: Yes(Slim fast)    Current Hospitalization:   Fluid Restriction:  NA  Appetite:  NA  PO Ability: Independent      Subjective:  Pt in good spirits. Admits she doesn't eat as much as she used to, but attempts to eat 3 meals per day (broken up). She drinks Slim Fast shake in the morning. She doesn't get to the grocery store often because she has to sign up for the bus at [employer] to take her. \"I'd like to lose weight, not gain it back. \"    Objective:  Chart reviewed, discussed with RN and team during interdisciplinary rounds. Pt admitted with weakness. PMHx: CAD, CABG x 3, DM, HTN, CKD, others noted. Pt would like Ensure Enlive BID (chocolate). RD to order. This will provide 700 kcal, 40 g protein per day-- meeting 49% and 57% of estimated kcal and protein needs, respectively. Pt with 8% weight loss x 8 months, 4% x 5 months. Tricep/clavicle muscle wasting noted. Patient meets criteria for Moderate Protein Calorie Malnutrition as evidenced by:   ASPEN Malnutrition Criteria  Acute Illness, Chronic Illness, or Social/Enviornmental: Acute illness  Energy Intake: <75% est energy req for > 7 days  Muscle Mass: Mild  ASPEN Malnutrition Score - Acute Illness: 2  Acute Illness - Malnutrition Diagnosis: Moderate malnutrition.       Estimated Nutrition Needs:   Kcals/day: 1883 Kcals/day(9924-9967 kcal/day (MSJ x 1.2-1.3))  Protein: 70 g(1 g/kg)  Fluid: (1 mL/kcal)  Based On: [address]  Weight Used: Actual wt(70.2 kg)    Pt expected to meet estimated nutrient needs:  []   Yes     []  No [x] Unable to predict at this time  Nutrition Diagnosis:   1. Inadequate protein-energy intake related to decreased appetite, mobility as evidenced by 8% weight loss x 8 months    Goals:     Pt to consume at least 1 supplement and 50% of all meals over the next 5-7 days     Monitoring & Evaluation:    - Total energy intake, Liquid meal replacement   - Weight/weight change   -      Previous Nutrition Goals Met:   N/A  Previous Recommendations:    N/A    Education & Discharge Needs:   [] None Identified   [] Identified and addressed    [x] Participated in care plan, discharge planning, and/or interdisciplinary rounds        Cultural, Uatsdin and ethnic food preferences identified: None    Skin Integrity: [x]Intact  []Other  Edema: [x]None []Other  Last BM: PTA  Food Allergies: [x]None []Other  Diet Restrictions: Cultural/Sikh Preference(s): None     Anthropometrics:    Weight Loss Metrics 7/11/2019 2/12/2019 11/15/2018 5/25/2018 10/25/2017 4/25/2017 10/25/2016   Today's Wt 154 lb 12.2 oz 162 lb 167 lb 9.6 oz 155 lb 166 lb 175 lb 180 lb   BMI 25.75 kg/m2 26.96 kg/m2 27.89 kg/m2 25.79 kg/m2 27.62 kg/m2 29.12 kg/m2 29.95 kg/m2      Weight Source: Standing scale (comment)  Height: 5' 5\" (165.1 cm),    Body mass index is 25.75 kg/m².      IBW : 56.7 kg (125 lb),    Usual Body Weight: 72.6 kg (160 lb),      Labs:    Lab Results   Component Value Date/Time    Sodium 139 07/11/2019 09:35 AM    Potassium 4.1 07/11/2019 09:35 AM    Chloride 107 07/11/2019 09:35 AM    CO2 27 07/11/2019 09:35 AM    Glucose 146 (H) 07/11/2019 09:35 AM    BUN 12 07/11/2019 09:35 AM    Creatinine 1.20 (H) 07/11/2019 09:35 AM    Calcium 10.1 07/11/2019 09:35 AM    Magnesium 2.0 07/11/2019 09:35 AM    Phosphorus 2.8 02/10/2015 01:33 AM    Albumin 3.8 07/11/2019 09:35 AM     Lab Results   Component Value Date/Time    Hemoglobin A1c 5.7 07/11/2019 09:35 AM    Hemoglobin A1c, External 6.3 06/29/2015 04:36 PM       Abbie Holland, [address] [facility]

## 2019-07-11 NOTE — PROGRESS NOTES
SSED/CM consult received and appreciated. MD assessing at time of CM visit. Case Management will follow for transitions of care needs.

## 2019-07-11 NOTE — ROUTINE PROCESS
TRANSFER - OUT REPORT: 
 
Verbal report given to Emy Dill RN (name) on Angely Ruano  being transferred to [facility](unit) for routine progression of care Report consisted of patients Situation, Background, Assessment and  
Recommendations(SBAR). Information from the following report(s) SBAR and MAR was reviewed with the receiving nurse. Lines:  
Peripheral IV Left Antecubital (Active) Site Assessment Clean, dry, & intact 7/11/2019  9:36 AM  
Phlebitis Assessment 0 7/11/2019  9:36 AM  
Infiltration Assessment 0 7/11/2019  9:36 AM  
Dressing Status Clean, dry, & intact 7/11/2019  9:36 AM  
Dressing Type Tape;Transparent 7/11/2019  9:36 AM  
Hub Color/Line Status Pink;Patent; Flushed 7/11/2019  9:36 AM  
Action Taken Blood drawn 7/11/2019  9:36 AM  
Alcohol Cap Used No 7/11/2019  9:36 AM  
  
 
Opportunity for questions and clarification was provided. Patient transported with: 
 [employer]

## 2019-07-12 ENCOUNTER — APPOINTMENT (OUTPATIENT)
Dept: CT IMAGING | Age: 79
DRG: 065 | End: 2019-07-12
Attending: HOSPITALIST
Payer: MEDICARE

## 2019-07-12 ENCOUNTER — APPOINTMENT (OUTPATIENT)
Dept: MRI IMAGING | Age: 79
DRG: 065 | End: 2019-07-12
Attending: HOSPITALIST
Payer: MEDICARE

## 2019-07-12 PROBLEM — I63.9 ACUTE ISCHEMIC STROKE (HCC): Status: ACTIVE | Noted: 2019-07-12

## 2019-07-12 LAB
ANION GAP SERPL CALC-SCNC: 5 MMOL/L (ref 5–15)
ANION GAP SERPL CALC-SCNC: 5 MMOL/L (ref 5–15)
BASOPHILS # BLD: 0 K/UL (ref 0–0.1)
BASOPHILS NFR BLD: 1 % (ref 0–1)
BUN SERPL-MCNC: 16 MG/DL (ref 6–20)
BUN SERPL-MCNC: 19 MG/DL (ref 6–20)
BUN/CREAT SERPL: 14 (ref 12–20)
BUN/CREAT SERPL: 15 (ref 12–20)
CALCIUM SERPL-MCNC: 9.7 MG/DL (ref 8.5–10.1)
CALCIUM SERPL-MCNC: 9.9 MG/DL (ref 8.5–10.1)
CHLORIDE SERPL-SCNC: 107 MMOL/L (ref 97–108)
CHLORIDE SERPL-SCNC: 108 MMOL/L (ref 97–108)
CO2 SERPL-SCNC: 26 MMOL/L (ref 21–32)
CO2 SERPL-SCNC: 26 MMOL/L (ref 21–32)
CREAT SERPL-MCNC: 1.17 MG/DL (ref 0.55–1.02)
CREAT SERPL-MCNC: 1.31 MG/DL (ref 0.55–1.02)
DIFFERENTIAL METHOD BLD: NORMAL
EOSINOPHIL # BLD: 0.2 K/UL (ref 0–0.4)
EOSINOPHIL NFR BLD: 3 % (ref 0–7)
ERYTHROCYTE [DISTWIDTH] IN BLOOD BY AUTOMATED COUNT: 12.6 % (ref 11.5–14.5)
GLUCOSE BLD STRIP.AUTO-MCNC: 94 MG/DL (ref 65–100)
GLUCOSE SERPL-MCNC: 102 MG/DL (ref 65–100)
GLUCOSE SERPL-MCNC: 98 MG/DL (ref 65–100)
HCT VFR BLD AUTO: 40.1 % (ref 35–47)
HGB BLD-MCNC: 13.1 G/DL (ref 11.5–16)
IMM GRANULOCYTES # BLD AUTO: 0 K/UL (ref 0–0.04)
IMM GRANULOCYTES NFR BLD AUTO: 0 % (ref 0–0.5)
INR PPP: 1.1 (ref 0.9–1.1)
LYMPHOCYTES # BLD: 1.8 K/UL (ref 0.8–3.5)
LYMPHOCYTES NFR BLD: 36 % (ref 12–49)
MCH RBC QN AUTO: 30.8 PG (ref 26–34)
MCHC RBC AUTO-ENTMCNC: 32.7 G/DL (ref 30–36.5)
MCV RBC AUTO: 94.1 FL (ref 80–99)
MONOCYTES # BLD: 0.5 K/UL (ref 0–1)
MONOCYTES NFR BLD: 10 % (ref 5–13)
NEUTS SEG # BLD: 2.5 K/UL (ref 1.8–8)
NEUTS SEG NFR BLD: 50 % (ref 32–75)
NRBC # BLD: 0 K/UL (ref 0–0.01)
NRBC BLD-RTO: 0 PER 100 WBC
PLATELET # BLD AUTO: 247 K/UL (ref 150–400)
PMV BLD AUTO: 9.1 FL (ref 8.9–12.9)
POTASSIUM SERPL-SCNC: 3.8 MMOL/L (ref 3.5–5.1)
POTASSIUM SERPL-SCNC: 4 MMOL/L (ref 3.5–5.1)
PROTHROMBIN TIME: 10.8 SEC (ref 9–11.1)
RBC # BLD AUTO: 4.26 M/UL (ref 3.8–5.2)
SERVICE CMNT-IMP: NORMAL
SODIUM SERPL-SCNC: 138 MMOL/L (ref 136–145)
SODIUM SERPL-SCNC: 139 MMOL/L (ref 136–145)
TROPONIN I SERPL-MCNC: 0.08 NG/ML
WBC # BLD AUTO: 5 K/UL (ref 3.6–11)

## 2019-07-12 PROCEDURE — 97116 GAIT TRAINING THERAPY: CPT | Performed by: PHYSICAL THERAPIST

## 2019-07-12 PROCEDURE — 82962 GLUCOSE BLOOD TEST: CPT

## 2019-07-12 PROCEDURE — 85610 PROTHROMBIN TIME: CPT

## 2019-07-12 PROCEDURE — 80048 BASIC METABOLIC PNL TOTAL CA: CPT

## 2019-07-12 PROCEDURE — 74011250637 HC RX REV CODE- 250/637: Performed by: HOSPITALIST

## 2019-07-12 PROCEDURE — 97161 PT EVAL LOW COMPLEX 20 MIN: CPT | Performed by: PHYSICAL THERAPIST

## 2019-07-12 PROCEDURE — 74011250636 HC RX REV CODE- 250/636: Performed by: NURSE PRACTITIONER

## 2019-07-12 PROCEDURE — 97165 OT EVAL LOW COMPLEX 30 MIN: CPT

## 2019-07-12 PROCEDURE — 74011000258 HC RX REV CODE- 258: Performed by: RADIOLOGY

## 2019-07-12 PROCEDURE — 97535 SELF CARE MNGMENT TRAINING: CPT

## 2019-07-12 PROCEDURE — 85025 COMPLETE CBC W/AUTO DIFF WBC: CPT

## 2019-07-12 PROCEDURE — 70553 MRI BRAIN STEM W/O & W/DYE: CPT

## 2019-07-12 PROCEDURE — 74011636320 HC RX REV CODE- 636/320: Performed by: RADIOLOGY

## 2019-07-12 PROCEDURE — 70496 CT ANGIOGRAPHY HEAD: CPT

## 2019-07-12 PROCEDURE — 74011250636 HC RX REV CODE- 250/636: Performed by: INTERNAL MEDICINE

## 2019-07-12 PROCEDURE — 74011250637 HC RX REV CODE- 250/637: Performed by: INTERNAL MEDICINE

## 2019-07-12 PROCEDURE — 74011250636 HC RX REV CODE- 250/636: Performed by: HOSPITALIST

## 2019-07-12 PROCEDURE — 72131 CT LUMBAR SPINE W/O DYE: CPT

## 2019-07-12 PROCEDURE — A9575 INJ GADOTERATE MEGLUMI 0.1ML: HCPCS | Performed by: HOSPITALIST

## 2019-07-12 PROCEDURE — 36415 COLL VENOUS BLD VENIPUNCTURE: CPT

## 2019-07-12 PROCEDURE — 65660000000 HC RM CCU STEPDOWN

## 2019-07-12 RX ORDER — HYDRALAZINE HYDROCHLORIDE 20 MG/ML
10 INJECTION INTRAMUSCULAR; INTRAVENOUS ONCE
Status: COMPLETED | OUTPATIENT
Start: 2019-07-12 | End: 2019-07-12

## 2019-07-12 RX ORDER — SODIUM CHLORIDE 0.9 % (FLUSH) 0.9 %
10 SYRINGE (ML) INJECTION
Status: COMPLETED | OUTPATIENT
Start: 2019-07-12 | End: 2019-07-12

## 2019-07-12 RX ORDER — LABETALOL HYDROCHLORIDE 5 MG/ML
5 INJECTION, SOLUTION INTRAVENOUS
Status: DISCONTINUED | OUTPATIENT
Start: 2019-07-12 | End: 2019-07-18 | Stop reason: HOSPADM

## 2019-07-12 RX ORDER — ACETAMINOPHEN 325 MG/1
650 TABLET ORAL
Status: DISCONTINUED | OUTPATIENT
Start: 2019-07-12 | End: 2019-07-18 | Stop reason: HOSPADM

## 2019-07-12 RX ORDER — SODIUM CHLORIDE 0.9 % (FLUSH) 0.9 %
5-40 SYRINGE (ML) INJECTION AS NEEDED
Status: DISCONTINUED | OUTPATIENT
Start: 2019-07-12 | End: 2019-07-18 | Stop reason: HOSPADM

## 2019-07-12 RX ORDER — SODIUM CHLORIDE 9 MG/ML
100 INJECTION, SOLUTION INTRAVENOUS CONTINUOUS
Status: DISPENSED | OUTPATIENT
Start: 2019-07-12 | End: 2019-07-13

## 2019-07-12 RX ORDER — GUAIFENESIN 100 MG/5ML
81 LIQUID (ML) ORAL DAILY
Status: DISCONTINUED | OUTPATIENT
Start: 2019-07-12 | End: 2019-07-18 | Stop reason: HOSPADM

## 2019-07-12 RX ORDER — GADOTERATE MEGLUMINE 376.9 MG/ML
14 INJECTION INTRAVENOUS
Status: COMPLETED | OUTPATIENT
Start: 2019-07-12 | End: 2019-07-12

## 2019-07-12 RX ORDER — ACETAMINOPHEN 650 MG/1
650 SUPPOSITORY RECTAL
Status: DISCONTINUED | OUTPATIENT
Start: 2019-07-12 | End: 2019-07-18 | Stop reason: HOSPADM

## 2019-07-12 RX ORDER — SODIUM CHLORIDE 0.9 % (FLUSH) 0.9 %
5-40 SYRINGE (ML) INJECTION EVERY 8 HOURS
Status: DISCONTINUED | OUTPATIENT
Start: 2019-07-12 | End: 2019-07-18 | Stop reason: HOSPADM

## 2019-07-12 RX ADMIN — PRAVASTATIN SODIUM 40 MG: 40 TABLET ORAL at 21:30

## 2019-07-12 RX ADMIN — Medication 10 ML: at 18:09

## 2019-07-12 RX ADMIN — HYDROXYZINE HYDROCHLORIDE 50 MG: 25 TABLET, FILM COATED ORAL at 18:52

## 2019-07-12 RX ADMIN — HYDROXYZINE HYDROCHLORIDE 50 MG: 25 TABLET, FILM COATED ORAL at 04:23

## 2019-07-12 RX ADMIN — HEPARIN SODIUM 5000 UNITS: 5000 INJECTION INTRAVENOUS; SUBCUTANEOUS at 06:39

## 2019-07-12 RX ADMIN — Medication 10 ML: at 22:00

## 2019-07-12 RX ADMIN — SODIUM CHLORIDE 100 ML: 900 INJECTION, SOLUTION INTRAVENOUS at 18:09

## 2019-07-12 RX ADMIN — PRIMIDONE 100 MG: 50 TABLET ORAL at 08:10

## 2019-07-12 RX ADMIN — ARIPIPRAZOLE 10 MG: 10 TABLET ORAL at 08:10

## 2019-07-12 RX ADMIN — Medication 10 ML: at 14:37

## 2019-07-12 RX ADMIN — HEPARIN SODIUM 5000 UNITS: 5000 INJECTION INTRAVENOUS; SUBCUTANEOUS at 21:30

## 2019-07-12 RX ADMIN — SODIUM CHLORIDE 100 ML/HR: 900 INJECTION, SOLUTION INTRAVENOUS at 18:51

## 2019-07-12 RX ADMIN — DULOXETINE HYDROCHLORIDE 120 MG: 60 CAPSULE, DELAYED RELEASE ORAL at 18:52

## 2019-07-12 RX ADMIN — DULOXETINE HYDROCHLORIDE 120 MG: 60 CAPSULE, DELAYED RELEASE ORAL at 08:10

## 2019-07-12 RX ADMIN — HYDRALAZINE HYDROCHLORIDE 10 MG: 20 INJECTION INTRAMUSCULAR; INTRAVENOUS at 04:20

## 2019-07-12 RX ADMIN — ASPIRIN 81 MG 81 MG: 81 TABLET ORAL at 18:53

## 2019-07-12 RX ADMIN — IOPAMIDOL 100 ML: 755 INJECTION, SOLUTION INTRAVENOUS at 18:09

## 2019-07-12 RX ADMIN — GADOTERATE MEGLUMINE 14 ML: 376.9 INJECTION INTRAVENOUS at 17:10

## 2019-07-12 RX ADMIN — CARVEDILOL 12.5 MG: 12.5 TABLET, FILM COATED ORAL at 18:53

## 2019-07-12 RX ADMIN — HYDROXYZINE HYDROCHLORIDE 50 MG: 25 TABLET, FILM COATED ORAL at 12:02

## 2019-07-12 RX ADMIN — PANTOPRAZOLE SODIUM 40 MG: 40 TABLET, DELAYED RELEASE ORAL at 07:07

## 2019-07-12 RX ADMIN — HEPARIN SODIUM 5000 UNITS: 5000 INJECTION INTRAVENOUS; SUBCUTANEOUS at 14:36

## 2019-07-12 RX ADMIN — PRIMIDONE 100 MG: 50 TABLET ORAL at 18:52

## 2019-07-12 RX ADMIN — Medication 10 ML: at 18:52

## 2019-07-12 RX ADMIN — Medication 5 ML: at 06:00

## 2019-07-12 RX ADMIN — CARVEDILOL 12.5 MG: 12.5 TABLET, FILM COATED ORAL at 08:10

## 2019-07-12 RX ADMIN — HYDROXYZINE HYDROCHLORIDE 50 MG: 25 TABLET, FILM COATED ORAL at 21:30

## 2019-07-12 RX ADMIN — HYDRALAZINE HYDROCHLORIDE 25 MG: 25 TABLET ORAL at 09:00

## 2019-07-12 RX ADMIN — HYDRALAZINE HYDROCHLORIDE 25 MG: 25 TABLET ORAL at 18:52

## 2019-07-12 NOTE — PROGRESS NOTES
Patient was admitted yesterday. Send her for brain MRI this afternoon as a work up for ambulatory dysfunction. Received critical report for a small left basal ganglia ischemic stroke. I came back to reassess patient during the code S. Patient clinical status has not changed from my exam earlier. She is alert and oriented x4,non focal exam except left shoulder limited ROM due to shoulder injury. It has been more than 24 hours since she is admitted,onset of symptoms prior to admission is unknown as well. Her exam is non focal,so she is not candidate for thrombolytic therapy.  -Started on aspirin  -She is already on statins  -She is already on tele monitor   -Teleneurologist consulted for additional recommendations. She already had echocardiogram today. Karli Curiel MD     Addendum  -Dr Claudeen Dura from tele neurologist called back. Case discussed. He agreed with current plan of care,no need for plain head CT. He did not feel he need to video-in and recommended in house neurologist see her tomorrow.

## 2019-07-12 NOTE — PROGRESS NOTES
Problem: Self Care Deficits Care Plan (Adult)  Goal: *Acute Goals and Plan of Care (Insert Text)  Description  Occupational Therapy Goals  Initiated 7/12/2019  1. Patient will perform bathing with modified independence within 7 day(s). 2.  Patient will perform lower body dressing with modified independence within 7 day(s). 3.  Patient will perform grooming with independence within 7 day(s). 4.  Patient will perform toilet transfers with modified independence within 7 day(s). 5.  Patient will perform all aspects of toileting with modified independence within 7 day(s). 6.  Patient will participate in upper extremity therapeutic exercise/activities with supervision/set-up for 10 minutes within 7 day(s). 7.  Patient will utilize energy conservation techniques during functional activities with verbal cues within 7 day(s). Outcome: Progressing Towards Goal   OCCUPATIONAL THERAPY EVALUATION  Patient: Fortunato Wilson (4 y.o. female)  Date: 7/12/2019  Primary Diagnosis: Elevated troponin [R74.8]        Precautions: Falls       ASSESSMENT :  Based on the objective data described below, the patient presents at min assist level with self-care and mobility. Pt seen in OT for bed mobility, bathroom mobility, toileting and grooming. Pt ambulated to bathroom using RW and demonstrated short shuffling gait. Per pt, she has had a h/o of falls and reports being weak which is why she walks the way she does. Feel pt may have a neurological deficit undiagnosed. Feel pt is not safe to return to her independent apt and would benefit from further therapy at discharge. Pt would benefit from skilled acute OT to address listed goals. Patient will benefit from skilled intervention to address the above impairments. Patient?s rehabilitation potential is considered to be Good  Factors which may influence rehabilitation potential include:   ? None noted  ? Mental ability/status  ? Medical condition  ? Home/family situation and support systems  ? Safety awareness  ? Pain tolerance/management  ? Other:      PLAN :  Recommendations and Planned Interventions:  ?               Self Care Training                  ? Therapeutic Activities  ? Functional Mobility Training    ? Cognitive Retraining  ? Therapeutic Exercises           ? Endurance Activities  ? Balance Training                   ? Neuromuscular Re-Education  ? Visual/Perceptual Training     ? Home Safety Training  ? Patient Education                 ? Family Training/Education  ? Other (comment):    Frequency/Duration: Patient will be followed by occupational therapy 5 times a week to address goals. Discharge Recommendations: Renzo Cunha  Further Equipment Recommendations for Discharge: TBD      SUBJECTIVE:   Patient stated ? I'm so weak. ?    OBJECTIVE DATA SUMMARY:   HISTORY:   Past Medical History:   Diagnosis Date    Anxiety disorder     Atrial fibrillation (HCC)     CAD (coronary artery disease) 2007    stents, CABG x 3v    Chronic kidney disease     Depression     Diabetes (HCC)     High cholesterol     History of peptic ulcer     Bleeding ulcer with increased NSAID use    Hypertension     MI, old 2007    Valvular heart disease      Past Surgical History:   Procedure Laterality Date    HX CORONARY ARTERY BYPASS GRAFT      HX ORTHOPAEDIC Right     HX TONSILLECTOMY         Prior Level of Function/Environment/Context: independent, per pt.    Occupations in which the patient is/was successful, what are the barriers preventing that success:   Performance Patterns (routines, roles, habits, and rituals):   Personal Interests and/or values:   Expanded or extensive additional review of patient history:     Home Situation  Home Environment: Apartment  # Steps to Enter: 0  One/Two Story Residence: Two story, live on 1st floor  # of Interior Steps: (none)  Height of Each Step (in): (none)  [employer]: (none)  Lift Chair Available: No  Living Alone: Yes  Support Systems: Keron Gracia / avtar community, Friends \ neighbors, Family member(s)  Patient Expects to be Discharged to[de-identified] Independent living facility  Current DME Used/Available at Home: Annice Mohs, straight, Walker, rolling, Grab bars  Tub or Shower Type: Shower    Hand dominance: Right    EXAMINATION OF PERFORMANCE DEFICITS:  Cognitive/Behavioral Status:  Neurologic State: Alert  Orientation Level: Oriented X4  Cognition: Appropriate decision making        Safety/Judgement: Awareness of environment    Skin: intact    Edema: none noted    Hearing: Auditory  Auditory Impairment: None    Vision/Perceptual:                                     Range of Motion:  L UE: limited range from rotator cuff injury 6 months ago  AROM: Generally decreased, functional  PROM: Generally decreased, functional                      Strength:    Strength: Generally decreased, functional                Coordination:  Coordination: Within functional limits  Fine Motor Skills-Upper: Left Intact; Right Intact    Gross Motor Skills-Upper: Left Intact; Right Intact    Tone & Sensation:    Tone: Normal  Sensation: Intact                      Balance:  Sitting: Intact  Standing: Impaired  Standing - Static: Constant support; Fair  Standing - Dynamic : Fair    Functional Mobility and Transfers for ADLs:  Bed Mobility:  Rolling: Modified independent  Supine to Sit: Supervision  Scooting: Supervision    Transfers:  Sit to Stand: Minimum assistance;Assist x1  Stand to Sit: Minimum assistance;Assist x1  Bathroom Mobility: Minimum assistance  Toilet Transfer : Minimum assistance    ADL Assessment:  Feeding: Independent    Oral Facial Hygiene/Grooming: Setup    Bathing: Minimum assistance    Upper Body Dressing: Setup    Lower Body Dressing: Minimum assistance    Toileting: Minimum assistance                ADL Intervention and task modifications:                                     Cognitive Retraining  Safety/Judgement: Awareness of environment    Therapeutic Exercise:      Functional Measure:  Barthel Index:    Bathin  Bladder: 10  Bowels: 10  Groomin  Dressin  Feeding: 10  Mobility: 0  Stairs: 0  Toilet Use: 5  Transfer (Bed to Chair and Back): 10  Total: 55/100        Percentage of impairment   0%   1-19%   20-39%   40-59%   60-79%   80-99%   100%   Barthel Score 0-100 100 99-80 79-60 59-40 20-39 1-19   0     The Barthel ADL Index: Guidelines  1. The index should be used as a record of what a patient does, not as a record of what a patient could do. 2. The main aim is to establish degree of independence from any help, physical or verbal, however minor and for whatever reason. 3. The need for supervision renders the patient not independent. 4. A patient's performance should be established using the best available evidence. Asking the patient, friends/relatives and nurses are the usual sources, but direct observation and common sense are also important. However direct testing is not needed. 5. Usually the patient's performance over the preceding 24-48 hours is important, but occasionally longer periods will be relevant. 6. Middle categories imply that the patient supplies over 50 per cent of the effort. 7. Use of aids to be independent is allowed. Kacie Mueller., Barthel, D.W. (1800). Functional evaluation: the Barthel Index. [facility] (14)2. FANTA Chan, Neha Jiménez., Speedy Rai., [place], [address] [place] [address] (). Measuring the change indisability after inpatient rehabilitation; comparison of the responsiveness of the Barthel Index and Functional [place] Measure. Journal of Neurology, Neurosurgery, and Psychiatry, 66(4), 535-562.   Ruthy Medrano, N.[place].ELAINE, APARNA Ventura, José Manuel Prado M.A. (2004.) Assessment of post-stroke quality of life in cost-effectiveness studies: The usefulness of the Barthel Index and the EuroQoL-5D. Quality of Life Research, 15, 628-40        Occupational Therapy Evaluation Charge Determination   History Examination Decision-Making   LOW Complexity : Brief history review  LOW Complexity : 1-3 performance deficits relating to physical, cognitive , or psychosocial skils that result in activity limitations and / or participation restrictions  LOW Complexity : No comorbidities that affect functional and no verbal or physical assistance needed to complete eval tasks       Based on the above components, the patient evaluation is determined to be of the following complexity level: LOW   Pain:  Pain Scale 1: Numeric (0 - 10)  Pain Intensity 1: 0              Activity Tolerance:   Fair  Please refer to the flowsheet for vital signs taken during this treatment. After treatment:   ? Patient left in no apparent distress sitting up in chair  ? Patient left in no apparent distress in bed  ? Call bell left within reach  ? Nursing notified  ? Caregiver present  ? Bed alarm activated    COMMUNICATION/EDUCATION:   The patient?s plan of care was discussed with: Physical Therapist and Registered Nurse.  ? Home safety education was provided and the patient/caregiver indicated understanding. ? Patient/family have participated as able in goal setting and plan of care. ? Patient/family agree to work toward stated goals and plan of care. ? Patient understands intent and goals of therapy, but is neutral about his/her participation. ? Patient is unable to participate in goal setting and plan of care. This patient?s plan of care is appropriate for delegation to [facility].     Thank you for this referral.  Heidi Taylor OTR/L  Time Calculation: 30 mins

## 2019-07-12 NOTE — PROGRESS NOTES
Hospitalist Progress Note  Josh Morales MD  Answering service: [phone] OR [phone] from in house phone      Date of Service:  2019  NAME:  Jose Manjarrez                                                         :  1940                                               MRN:  [id number]    Brief admission summary   Patient came to the ED for difficulty ambulation. Her legs are weak,the left more than the right. Subjective/interval history    Patient says her weakness has progressed to the point where she is barely shuffling. Denied h/o stroke. She had multiple DJD. PT assessment was concerning. Assessment and plan      Ambulatory dysfunction r/o central neurologic processes. PT assessment was concerning and they wondered if she could have neurologic cause for her sxs  -Ordered mri brain and lumbar spine CT  -PT eval on going  -May consult ,but will wait for imaging results. Elevated Troponin,non specific. She has no chest pain.  -troponin is minimally elevated and is flat. Echocardiogram w/o preserved EF and no wall motion abnormality. EKG- stable, no ST- T changes. Cardiology consulted,input appreciated.       Hx CAD s/p CABG  - restarted home meds coreg, statin       HTN- BP stable. C/w coreg, hydralzine  GERD - protonix  CKD- cr 1.2 , near baseline, will monitor      Left shoulder pain- chronic     Anxiety - c/w home meds  Abilify, Cymbalta, hydroxyzine           DVT ppx: heparin  Code status: Full - patient has no living will  Disposition: she is unsafe to return home. Work is underway. She likely needs rehab. Current facility administered and prior to admit medications reviewed. x         Review of Systems:  A comprehensive review of systems was negative except for that written in the HPI.         PHYSICAL EXAM:  O:  Visit Vitals  /77 (BP 1 Location: Left arm, BP Patient Position: At rest;Supine)   Pulse 61   Temp 97.9 °F (36.6 °C)   Resp 16   Ht 5' 5\" (1.651 m)   Wt 69.4 kg (153 lb)   SpO2 98%   Breastfeeding? No   BMI 25.46 kg/m²       General Appears comfortable,not in distress. HEENT Head atraumatic. Unicteric sclera. Moist buccal mucosa. PERRLA     CVS RRR, no MRG, no JVD, no peripheral edema       Chest No deformity  No accessory muscle use  Vesicular air entry symmetrically  No wheezing,ronchi or crepitations       Abdomen &Pelvis Not distended. Normoactive. Soft. Non tender. No hepatomegally       Genitourinary  No CVA or suprapubic tenderness       Musculoskeletal limited rom left shoulder due to previous fall. Skin No erythema,rash,depigmentation       Neurology Mental status: alert and oriented x4  Cranial nerves: CN 2-12 intact. Non focal,generalized weakness. Intake/Output Summary (Last 24 hours) at 7/12/2019 1555  Last data filed at 7/12/2019 1140  Gross per 24 hour   Intake 360 ml   Output    Net 360 ml          Recent labs & imaging reviewed:    Problem List as of 7/12/2019 Date Reviewed: 2/12/2019          Codes Class Noted - Resolved    Fall ICD-10-CM: W19. Poonam Campos  ICD-9-CM: E888.9  12/24/2018 - Present        CKD (chronic kidney disease), stage III ([facility] ICD-10-CM: N18.3  ICD-9-CM: 585.3  7/8/2015 - Present        Edema ICD-10-CM: R60.9  ICD-9-CM: 782.3  5/6/2015 - Present        Diabetes mellitus ([facility] ICD-10-CM: E11.9  ICD-9-CM: 250.00  5/6/2015 - Present        Postoperative anemia due to acute blood loss ICD-10-CM: D62  ICD-9-CM: 285.1  2/14/2015 - Present        S/P CABG (coronary artery bypass graft) ICD-10-CM: Z95.1  ICD-9-CM: V45.81  2/13/2015 - Present        Syncope ICD-10-CM: R55  ICD-9-CM: 780.2  2/9/2015 - Present        Bradycardia ICD-10-CM: R00.1  ICD-9-CM: 427.89  2/9/2015 - Present        MORENO (acute kidney injury) ([address] ICD-10-CM: N17.9  ICD-9-CM: 584.9  2/9/2015 - Present        Anemia ICD-10-CM: D64.9  ICD-9-CM: 285.9  9/9/2014 - Present        GI bleed ICD-10-CM: K92.2  ICD-9-CM: 578.9  9/9/2014 - Present AF (atrial fibrillation) (HCC) ICD-10-CM: I48.91  ICD-9-CM: 427.31  6/20/2014 - Present        Hypotension ICD-10-CM: I95.9  ICD-9-CM: 458.9  6/3/2014 - Present        CAD (coronary artery disease) ICD-10-CM: I25.10  ICD-9-CM: 414.00  6/3/2014 - Present    Overview Signed 2/10/2015 11:06 AM by Thor Fuentes     2007 PCI x2 to LAD with STEPHAN             S/P coronary artery stent placement ICD-10-CM: Z95.5  ICD-9-CM: V45.82  6/3/2014 - Present        Renal failure ICD-10-CM: N19  ICD-9-CM: 397  6/3/2014 - Present        * (Principal) Elevated troponin ICD-10-CM: R74.8  ICD-9-CM: 790.6  6/3/2014 - Present        Pericardial effusion ICD-10-CM: I31.3  ICD-9-CM: 423.9  6/3/2014 - Present        Lactic acidosis ICD-10-CM: E87.2  ICD-9-CM: 276.2  6/3/2014 - Present                Radha Xie MD  Internal Medicine  Date of Service: 7/12/2019

## 2019-07-12 NOTE — PROGRESS NOTES
Pt reports unsteady gait,diffficulty with ambulation,especially since falling at home and injured L shoulder. Observed pt amb to [facility] using walker and partial assist one staff. Jeannine De La Paz Unsteady. Denies pain. Hand tremor noted. SB 50's T wave inversion. //76. Chems WNL.

## 2019-07-12 NOTE — CONSULTS
Cardiology Consult Note      Patient Name: Daniel Álvarez  : 1940 MRN: [id number]  Date: 2019  Time: 12:49 PM    ED Diagnosis: Lower leg weakness    Primary Cardiologist: Rai Lopez M.D. Consulting Cardiologist: Otto Amaya MD    Reason for Consult: troponin elevation    Requesting MD: Dr. Lisha Hernandez    Updates/Subjective: No chest pain, Dyspnea,orthopnea. Still reports frozen lower extremities with inability to move. Troponins marginal and flat. NO consistent with MI. Echo EF normal with no regional WMA. Plan: No immediate cardiac issues. Continue home maintenance meds for chronic CAD and s/p CABG. EF normal with no evidence of CHF. Follow up with  on elective basis. Cardiology will sign off for now. HPI:  Daniel Álvarez is a 67 y.o. female evaluated on 2019  for troponin elevation. She has a past medical history of Anxiety disorder, Atrial fibrillation ([facility] [address] CAD (coronary artery disease) (), Chronic kidney disease, Depression, Diabetes ([facility] [address] High cholesterol, History of peptic ulcer, Hypertension, MI, old (), and Valvular heart disease. Presents for B/L LE weakness. She is a difficult historian. She notes her leg weakness has been ongoing for the last month. Seen by per PCP for this and prescribed Robaxin. She notes this has not been helpful. She was told her blood flow in her legs was \"not good\", but has had no further workup. She does have fairly frequent GLFs. The last one resulting in left shoulder injury requiring surgery. She does note she has arthritis in her back and shoulders and has had knee replacement for this as well. Cardiac h/o CABG x 3 in , stents placed prior in . Precipitator to CABG was nuclear stress test for syncope and bradycardia. She also has h/o HTN, HLD, DM and PAF. Subjective:  Denies CP or SOB. Only c/o weakness, equally, in both legs. She has a flat affect and slow reactions      Assessment and Plan     1. Troponin elevation    - 0.11 then 0.09 and 0.08   - Asymptomatic   - EKG without evidence for ACS   - echo ordered. 2. B/L leg weakness   - Noted weak on exam   - H/o LBP and OA - ? Cord compression   - CT neck 2018 demonstrated spondylitic changes causing spinal stenosis  3. CAD   - h/o stents in 2007   - CABG x 3 in 2015 (LIMA - LAD, SVG - Diag1 - OM1)   - Coreg, Pravachol, ASA   - Echo ordered  4. DMII   - Not on meds  5. CKD   - stage II   - Cr. 1.2  6. Depression   - poly pharmacy - Atarax, Abilify, Cymbalta, Valium   - Also uses Ambien at night for sleep   - Combination of these drugs increases risk for CNS and respiratory depression  7. Essential tremor   - On Mysoline    Asymptomatic troponin elevation, without EKG changes. Not NSTEMI. ProBNP elevated as well, but clear lungs and no edema. Echo ordered, trend second trop. H/o CAD, s/p CABG, but asymptomatic and without EKG changes; ischemic work up can be completed as outpatient with Dr. Joie Sim. C/o B/L leg weakness, I suspect from OA and spinal stenosis. Appropriate to continue all cardiac meds. Poly pharmacy associated with depression treatment and sleep aids. Agree with senior services, she does have falls and lives alone. Will follow up echo and troponin results.         Patient Active Problem List   Diagnosis Code    Hypotension I95.9    CAD (coronary artery disease) I25.10    S/P coronary artery stent placement Z95.5    Renal failure N19    Elevated troponin R74.8    Pericardial effusion I31.3    Lactic acidosis E87.2    AF (atrial fibrillation) ([facility]) I48.91    Anemia D64.9    GI bleed K92.2    Syncope R55    Bradycardia R00.1    MORENO (acute kidney injury) ([facility] [address] N17.9    Postoperative anemia due to acute blood loss D62    S/P CABG (coronary artery bypass graft) Z95.1    Edema R60.9    Diabetes mellitus ([facility]) E11.9    CKD (chronic kidney disease), stage III [facility]) N18.3    Fall W19. Sharene Levo       - 2007 PCI x2 to LAD with STEPHAN  - TTE 6/6/14 LVEF 55 % to 60 %. No RWMA. Small to mod pericardial effusion identified    circumferential to the heart, no evidence of hemodynamiccompromise. No significant change. From   6/3/14  - NM stress 6/6/14 Mildly reversal focal inferior wall defect concerning for myocardium at ischemic      risk. Normal wall motion with an EF of 65 %. - TTE 6/3/14 LVEF 55 % to 60 %. No RWMA. LAE. Mod MAC. Mild TR. PASP 45mmHg. A small to      moderate pericardial effusion was identified circumferential to the heart  - NM 2/15 At stress, there is diminished activity in the apex and inferior wall which is mildly improved     at rest suspicious for ischemia. LVEF 70%. - cath 2/11/15 100% distal RCA, PDA fills from left. LAD ostial 70%, mid 99% within prior stents,     distal 90%. Ostial LCX 90%, OM 1 80%. LV normal.    - CABG  2/13/15 - LIMA to LAD, SVG to 1Diag to 1OM.         Review of Systems:     GENERAL   Recent weight loss - no   Fever -----------------   no   Chills -----------------   no     EYES, VISION   Visual Changes - no     EARS, NOSE, THROAT   Hearing loss ----------- no   Swallowing difficulties - no     CARDIOVASCULAR   Chest pain/pressure ---- no   Arrhythmia/palpitations - no       RESPIRATORY   Cough ------------------ no   Shortness of breath - no   Wheezing -------------- no   GASTROINTESTINAL   Abdominal pain - no   Heartburn -------- no   Bloody stool ----- no     GENITOURINARY   Frequent urination - no   Urgency -------------- no     MUSCULOSKELETAL   Joint pain/swelling ---- no   Musculoskeletal pain - no     SKIN & INTEGUMENTARY   Rashes - no   Sores --- no         NEUROLOGICAL   Numbness/tingling - no   Sensation loss ------ no     PSYCHIATRIC   Nervousness/anxiety - no   Depression -------------- no     ENDOCRINE   Heat/cold intolerance - no   Excessive thirst -------- no     HEMATOLOGIC/LYMPHATIC   Abnormal bleeding - no ALL/IMMUN   Allergic reaction ------ no   Recurrent infections - no     Previous treatment/evaluation includes Coronary Artery Bypass Graft, Percutaneous Coronary Intervention and cardiac catheterization .   Cardiac risk factors: dyslipidemia, diabetes mellitus, sedentary life style, hypertension, post-menopausal.    Past Medical History:   Diagnosis Date    Anxiety disorder     Atrial fibrillation (HCC)     CAD (coronary artery disease) 2007    stents, CABG x 3v    Chronic kidney disease     Depression     Diabetes ([facility])     High cholesterol     History of peptic ulcer     Bleeding ulcer with increased NSAID use    Hypertension     MI, old 2007    Valvular heart disease      Past Surgical History:   Procedure Laterality Date    HX CORONARY ARTERY BYPASS GRAFT      HX ORTHOPAEDIC Right     HX TONSILLECTOMY       Current Facility-Administered Medications   Medication Dose Route Frequency    sodium chloride (NS) flush 5-40 mL  5-40 mL IntraVENous Q8H    sodium chloride (NS) flush 5-40 mL  5-40 mL IntraVENous PRN    acetaminophen (TYLENOL) tablet 650 mg  650 mg Oral Q4H PRN    heparin (porcine) injection 5,000 Units  5,000 Units SubCUTAneous Q8H    ARIPiprazole (ABILIFY) tablet 10 mg  10 mg Oral DAILY    carvedilol (COREG) tablet 12.5 mg  12.5 mg Oral BID WITH MEALS    diazePAM (VALIUM) tablet 6 mg  6 mg Oral Q6H PRN    DULoxetine (CYMBALTA) capsule 120 mg  120 mg Oral BID    hydrALAZINE (APRESOLINE) tablet 25 mg  25 mg Oral BID    hydrOXYzine HCl (ATARAX) tablet 50 mg  50 mg Oral Q6H    pantoprazole (PROTONIX) tablet 40 mg  40 mg Oral ACB    pravastatin (PRAVACHOL) tablet 40 mg  40 mg Oral QHS    primidone (MYSOLINE) tablet 100 mg  100 mg Oral BID    senna-docusate (PERICOLACE) 8.6-50 mg per tablet 1 Tab  1 Tab Oral DAILY PRN    zolpidem (AMBIEN) tablet 10 mg  10 mg Oral QHS PRN       No Known Allergies   Family History   Problem Relation Age of Onset    Heart Attack Mother 70        Dec 81yo    Hypertension Mother     Other Father         Unknown    Parkinson's Disease Brother       Social History     Socioeconomic History    Marital status: SINGLE     Spouse name: Not on file    Number of children: Not on file    Years of education: Not on file    Highest education level: Not on file   Occupational History    Occupation: Retired realestate/teacher   Tobacco Use    Smoking status: Former Smoker     Types: Cigarettes    Smokeless tobacco: Never Used   Substance and Sexual Activity    Alcohol use: Yes     Alcohol/week: 0.0 oz     Comment: Rare    Drug use: No   Social History Narrative    Lives in [facility]       Objective:    Physical Exam    Vitals:   Vitals:    07/11/19 1948 07/12/19 0018 07/12/19 0337 07/12/19 0729   BP: 155/67 160/76 182/75 173/77   Pulse: 62 (!) 59 61 64   Resp: 16 16 16 16   Temp: 98.5 °F (36.9 °C) 98.2 °F (36.8 °C) 98.1 °F (36.7 °C) 98.5 °F (36.9 °C)   SpO2: 94% 94% 92% 98%   Weight:   153 lb (69.4 kg)    Height:           General:    Alert, cooperative, no distress, appears stated age. Flat affect   Neck:   Supple, no carotid bruit and no JVD. Back:     Symmetric, normal curvature. Lungs:     Clear to auscultation bilaterally. Heart[de-identified]    Regular rate and rhythm, S1, S2 normal, no murmur, click, rub or gallop. Abdomen:     Soft, non-tender. Bowel sounds normal.    Extremities:   Extremities normal, atraumatic, no cyanosis or edema. Vascular:   Pulses - 2+ DP/PT and radials   Skin:   Skin color normal. No rashes or lesions   Neurologic:   CN II-XII grossly intact.         Telemetry: normal sinus rhythm    ECG:   EKG Results     Procedure 720 Value Units Date/Time    EKG 12 LEAD INITIAL [[id number]] Collected:  07/11/19 0923    Order Status:  Completed Updated:  07/11/19 1030     Ventricular Rate 56 BPM      Atrial Rate 56 BPM      P-R Interval 156 ms      QRS Duration 92 ms      Q-T Interval 398 ms      QTC Calculation (Bezet) 384 ms      Calculated P Axis 33 degrees      Calculated R Axis -51 degrees      Calculated T Axis 154 degrees      Diagnosis --     Sinus bradycardia  Left anterior fascicular block  Left ventricular hypertrophy with repolarization abnormality  When compared with ECG of 24-DEC-2018 12:43,  Vent. rate has decreased BY  48 BPM  QT has shortened  Confirmed by Harvin Favre, MD ([id number]) on 7/11/2019 10:30:30 AM              Data Review:     Radiology:   XR Results (most recent):  Results from [place] encounter on 07/11/19   XR CHEST PA LAT    Narrative Indication:  elevated trop     Exam: PA and lateral views of the chest.    Direct comparison is made to prior CXR dated February 2015. Findings: Cardiomediastinal silhouette is stable. Median sternotomy wires are  noted. Lungs are clear bilaterally. Pleural spaces are normal. Osseous  structures are diffusely demineralized, and intact. Impression IMPRESSION: No acute cardiopulmonary disease. Recent Labs     07/11/19  2220 07/11/19  1506 07/11/19  0935   CPK  --   --  75   TROIQ 0.08* 0.09* 0.11*     Recent Labs     07/12/19  0431 07/11/19  0935    139   K 3.8 4.1    107   CO2 26 27   BUN 16 12   CREA 1.17* 1.20*   GLU 98 146*   CA 9.7 10.1     Recent Labs     07/11/19  0935   WBC 4.7   HGB 13.1   HCT 40.2        Recent Labs     07/11/19  0935   SGOT 27   *     No results for input(s): CHOL, LDLC in the last 72 hours. No lab exists for component: TGL, HDLC,  HBA1C  Recent Labs     07/11/19  0935   TSH 1.60       Rob Schroeder MD         [facility] [address] [place] [address], [address] [place] [address] [address]     [address], [address]     [address] [place] [address], [place], [place]

## 2019-07-12 NOTE — PROGRESS NOTES
Spiritual Care Assessment/Progress Note  [facility]      NAME: Trang Moreno      MRN: [id number]  AGE: 67 y.o.  SEX: female  Oriental orthodox Affiliation: Mu-ism   Language: English     7/12/2019           Spiritual Assessment begun in [facility] [place] 3N TELEMETRY through conversation with:         [x]Patient        [] Family    [] Friend(s)        Reason for Consult: Advance medical directive consult     Spiritual beliefs: (Please include comment if needed)     [x] Identifies with a avtar tradition:         [] Supported by a avtar community:            [] Claims no spiritual orientation:           [] Seeking spiritual identity:                [] Adheres to an individual form of spirituality:           [] Not able to assess:                           Identified resources for coping:      [x] Prayer                               [] Music                  [] Guided Imagery     [x] Family/friends                 [] Pet visits     [] Devotional reading                         [] Unknown     [] Other:                                               Interventions offered during this visit: (See comments for more details)    Patient Interventions: Advance medical directive consult, Affirmation of emotions/emotional suffering, Catharsis/review of pertinent events in supportive environment, Iconic (affirming the presence of God/Higher Power), Initial/Spiritual assessment, patient floor, Prayer (actual), Prayer (assurance of)           Plan of Care:     [x] Support spiritual and/or cultural needs    [] Support AMD and/or advance care planning process      [] Support grieving process   [] Coordinate Rites and/or Rituals    [] Coordination with community clergy   [] No spiritual needs identified at this time   [] Detailed Plan of Care below (See Comments)  [] Make referral to Music Therapy  [] Make referral to Pet Therapy     [] Make referral to Addiction services  [] Make referral to [facility]  [] Make referral to Spiritual Care Partner  [] No future visits requested        [x] Follow up visits as needed     Comments: Visited Ms Marianne Hodgkin in room 30 regarding an in-basket request for AMD information. Patient was lying quietly in bed and appeared in good spirits. It was noted that patient already had and AMD on file; she stated that she was not aware that she had previously completed an AMD. Shared with patient a copy of the AMD that was on file and allowed her time to review the document. Ms Marianne Hodgkin stated that it accurately reflected her wishes and she did not want to make any changes or complete a new AMD. Left the copy with patient for her personal records. Ms Marianne Hodgkin requested that  have a prayer of healing on behalf of her and of a friend of hers; had prayer as requested. Patient shared that she watched [employer] services and used daily devotional provided by his [employer]). Assured her of ongoing  availability for support. : Rev. Gordo Gregory.  Pearl Arenas; [facility], to contact [phone] To [place] call: [phone]

## 2019-07-12 NOTE — PROGRESS NOTES
Problem: Mobility Impaired (Adult and Pediatric)  Goal: *Acute Goals and Plan of Care (Insert Text)  Description  Physical Therapy Goals   7/12/2019  1. Patient will move from supine to sit and sit to supine , scoot up and down and roll side to side in bed with supervision/set-up within 7 day(s). 2.  Patient will transfer from bed to chair and chair to bed with supervision/set-up using the least restrictive device within 7 day(s). 3.  Patient will perform sit to stand with supervision/set-up within 7 day(s). 4.  Patient will ambulate with supervision/set-up for 100 feet with the least restrictive device within 7 day(s). Outcome: Progressing Towards Goal    PHYSICAL THERAPY EVALUATION  Patient: Miquel Alba (83 y.o. female)  Date: 7/12/2019  Primary Diagnosis: Elevated troponin [R74.8]        Precautions: fall       ASSESSMENT :   Based on the objective data described below, patient presents with Minimum assistance overall for functional mobility. Gait training completed at Minimum assistance, 30 feet and using a gait belt and rolling walker. Patient is not at all safe to be up alone. Requires CGA at all times. Ambulated into the bathroom and toileted with OT assist.  Once she got back to the chair she had max fatigue. Patient has short shuffling steps. She has very little trunk rotation, has difficulty coordinating ankle dorsi and plantar flexion, difficulty with LAQ's and lacks any fluidity of movement. She has a rather flat affect and uses a RW. She appears to have a neurological condition that is affecting her gait although she denies any such diagnosis. She is a huge fall risk and appears to have more than generalized weakness.     The following are barriers to independence while in acute care:   -Cognitive and/or behavioral: none    -Medical condition: functional endurance, standing balance and cardiac tolerance    -Other:       The patient will benefit from skilled acute intervention to address the above impairments and their rehabilitation potential is considered to be Fair    Discharge recommendations: Rehab at skilled nursing facility (SNF)   (to regain functional baseline patient requires rehab)  If above is not an option then recommend: None    Patient's barriers to discharging home, in addition to above impairments: lives alone  history of falls  level of physical assist required to maintain patient safety. Equipment recommendations for successful discharge (if) home: none        PLAN :  Recommendations and Planned Interventions: transfer training, gait training, therapeutic exercises and therapeutic activities      Frequency/Duration: Patient will be followed by physical therapy  5 times a week to address goals. SUBJECTIVE:   Patient stated ? I just got so weak at home I couldn't make it on the bus to the store so I came in here. I saw a neurologist 6 months ago and they said I didn't have Parkinsons. I walk like this because I am weak. ?    OBJECTIVE DATA SUMMARY:   HISTORY:    Past Medical History:   Diagnosis Date    Anxiety disorder     Atrial fibrillation (HCC)     CAD (coronary artery disease) 2007    stents, CABG x 3v    Chronic kidney disease     Depression     Diabetes (HCC)     High cholesterol     History of peptic ulcer     Bleeding ulcer with increased NSAID use    Hypertension     MI, old 2007    Valvular heart disease      Past Surgical History:   Procedure Laterality Date    HX CORONARY ARTERY BYPASS GRAFT      HX ORTHOPAEDIC Right     HX TONSILLECTOMY       Prior Level of Function/Home Situation: Lives alone in a senior apt. Has nieces that help her out but takes the bus to the store and does her own shopping. Personal factors and/or comorbidities impacting plan of care: Fall hx.      Home Situation  Home Environment: Apartment  # Steps to Enter: 0  One/Two Story Residence: Two story, live on 1st floor  # of Interior Steps: (none)  Height of Each Step (in): (none)  Interior Rails: (none)  Lift Chair Available: No  Living Alone: Yes  Support Systems: Ladell Duty / avtar community, Friends \ neighbors, Family member(s)  Patient Expects to be Discharged to[de-identified] Independent living facility  Current DME Used/Available at Home: Nereida Israel, straight, Walker, rolling, Grab bars  Tub or Shower Type: Shower    EXAMINATION/PRESENTATION/DECISION MAKING:   Critical Behavior:  Neurologic State: Alert  Orientation Level: Oriented X4  Cognition: Appropriate decision making  Safety/Judgement: Awareness of environment  Hearing: Auditory  Auditory Impairment: None  Skin:  per nursing. Edema: per nursing. Range Of Motion:  AROM: Generally decreased, functional           PROM: Generally decreased, functional           Strength:    Strength: Generally decreased, functional                    Tone & Sensation:   Tone: Normal              Sensation: Intact               Coordination:  Coordination: Within functional limits  Vision:      Functional Mobility:  Bed Mobility:  Rolling: Modified independent  Supine to Sit: Supervision     Scooting: Supervision  Transfers:  Sit to Stand: Minimum assistance;Assist x1  Stand to Sit: Minimum assistance;Assist x1                       Balance:   Sitting: Intact  Standing: Impaired  Standing - Static: Constant support; Fair  Standing - Dynamic : Fair  Ambulation/Gait Training:  Distance (ft): 30 Feet (ft)  Assistive Device: Walker, rolling;Gait belt  Ambulation - Level of Assistance: Minimal assistance     Gait Description (WDL): Exceptions to WDL  Gait Abnormalities: Decreased step clearance;Shuffling gait        Base of Support: Narrowed     Speed/Samira: Pace decreased (<100 feet/min); Shuffled; Slow  Step Length: Right shortened;Left shortened                  Patient has short shuffling steps with no trunk rotation. Fairly rigid. Encouraged to do the seated ex of ankle pumps, LAQ, marching.    Functional Measure:  Barthel Index:    Bathing: 0  Bladder: 10  Bowels: 10  Groomin  Dressin  Feeding: 10  Mobility: 0  Stairs: 0  Toilet Use: 5  Transfer (Bed to Chair and Back): 10  Total: 55/100       Percentage of impairment   0%   1-19%   20-39%   40-59%   60-79%   80-99%   100%   Barthel Score 0-100 100 99-80 79-60 59-40 20-39 1-19   0     The Barthel ADL Index: Guidelines  1. The index should be used as a record of what a patient does, not as a record of what a patient could do. 2. The main aim is to establish degree of independence from any help, physical or verbal, however minor and for whatever reason. 3. The need for supervision renders the patient not independent. 4. A patient's performance should be established using the best available evidence. Asking the patient, friends/relatives and nurses are the usual sources, but direct observation and common sense are also important. However direct testing is not needed. 5. Usually the patient's performance over the preceding 24-48 hours is important, but occasionally longer periods will be relevant. 6. Middle categories imply that the patient supplies over 50 per cent of the effort. 7. Use of aids to be independent is allowed. Tin Marie., Barthel, D.W. ([id number]). Functional evaluation: the Barthel Index. [address] [facility] (14)2. FANTA Barrett, Reyna Ferrer., Kiran [facility].[address] (). Measuring the change indisability after inpatient rehabilitation; comparison of the responsiveness of the Barthel Index and Functional [place] Measure. Journal of Neurology, Neurosurgery, and Psychiatry, 66(4), 650-981. Phil Gonzalez, N.J.A, APARNA Ventura, & Gian Garvey M.A. (2004.) Assessment of post-stroke quality of life in cost-effectiveness studies: The usefulness of the Barthel Index and the EuroQoL-5D.  Quality of Life Research, 15, 337-32           Physical Therapy Evaluation Charge Determination   History Examination Presentation Decision-Making   LOW Complexity : Zero comorbidities / personal factors that will impact the outcome / POC LOW Complexity : 1-2 Standardized tests and measures addressing body structure, function, activity limitation and / or participation in recreation  LOW Complexity : Stable, uncomplicated  LOW Complexity : FOTO score of       Based on the above components, the patient evaluation is determined to be of the following complexity level: LOW       Activity Tolerance:   Fair  Please refer to the flowsheet for vital signs taken during this treatment. After treatment patient left:   Up in chair  Call light within reach  RN notified     COMMUNICATION/EDUCATION:   The patient?s plan of care was discussed with: Occupational Therapist and Registered Nurse. Fall prevention education was provided and the patient/caregiver indicated understanding., Patient/family have participated as able in goal setting and plan of care. and Patient/family agree to work toward stated goals and plan of care.     Thank you for this referral.  Emma Conn, PT   Time Calculation: 28 mins

## 2019-07-12 NOTE — ACP (ADVANCE CARE PLANNING)
Visited Ms Steve Frazier in room 30 regarding an in-basket request for AMD information. Patient was lying quietly in bed and appeared in good spirits. It was noted that patient already had and AMD on file; she stated that she was not aware that she had previously completed an AMD. Shared with patient a copy of the AMD that was on file and allowed her time to review the document. Ms Steve Frazier stated that it accurately reflected her wishes and she did not want to make any changes or complete a new AMD. Left the copy with patient for her personal records. : Rev. Meeta Walker.  Poonam Page; [facility], to contact [phone] To [place] call: [phone]

## 2019-07-12 NOTE — PROGRESS NOTES
1720- MRI called with results of MRI small infarct. 1721-Called Dr. Katerina Ardon. 1740-Called code stroke. 1740- In room, Patient sitting up eating dinner. 200 Dr. Katerina Ardon in the room and informed Sadie of results of small stroke shown in MRI. [address], RN arrived from stroke Team. Vitals 98.6 F, HR 66, RR 16, 97% room air, 170/78, BG 94 NIHS scale completed by Roxanna, score of 0.     1750- Labs drawn and sent. [address]- [facility] nurse arrived with tele neurologist. Dr. Katerina Ardon called to speak to tele neurogist. CTA ordered. 1800 taken to CT with [address] Returned to room post CT scan. Dr. Katerian Ardon notified, no new orders, neurologist coming to evaluate in the morning, time of last known well was 3-4 months ago. Bedside shift change report given to Zachary Mcrae (oncoming nurse) by Tracey Rabago (offgoing nurse). Report included the following information SBAR, Kardex, Procedure Summary, Intake/Output, MAR, Accordion, Recent Results, Med Rec Status and Cardiac Rhythm NSR.

## 2019-07-13 LAB
ANION GAP SERPL CALC-SCNC: 5 MMOL/L (ref 5–15)
BUN SERPL-MCNC: 20 MG/DL (ref 6–20)
BUN/CREAT SERPL: 17 (ref 12–20)
CALCIUM SERPL-MCNC: 9 MG/DL (ref 8.5–10.1)
CHLORIDE SERPL-SCNC: 108 MMOL/L (ref 97–108)
CHOLEST SERPL-MCNC: 156 MG/DL
CO2 SERPL-SCNC: 26 MMOL/L (ref 21–32)
CREAT SERPL-MCNC: 1.17 MG/DL (ref 0.55–1.02)
GLUCOSE SERPL-MCNC: 124 MG/DL (ref 65–100)
HDLC SERPL-MCNC: 50 MG/DL
HDLC SERPL: 3.1 {RATIO} (ref 0–5)
LDLC SERPL CALC-MCNC: 91.6 MG/DL (ref 0–100)
LIPID PROFILE,FLP: NORMAL
POTASSIUM SERPL-SCNC: 3.7 MMOL/L (ref 3.5–5.1)
SODIUM SERPL-SCNC: 139 MMOL/L (ref 136–145)
TRIGL SERPL-MCNC: 72 MG/DL (ref ?–150)
VLDLC SERPL CALC-MCNC: 14.4 MG/DL

## 2019-07-13 PROCEDURE — 74011250637 HC RX REV CODE- 250/637: Performed by: FAMILY MEDICINE

## 2019-07-13 PROCEDURE — 97116 GAIT TRAINING THERAPY: CPT

## 2019-07-13 PROCEDURE — 80061 LIPID PANEL: CPT

## 2019-07-13 PROCEDURE — 65660000000 HC RM CCU STEPDOWN

## 2019-07-13 PROCEDURE — 74011250637 HC RX REV CODE- 250/637: Performed by: INTERNAL MEDICINE

## 2019-07-13 PROCEDURE — 74011250636 HC RX REV CODE- 250/636: Performed by: INTERNAL MEDICINE

## 2019-07-13 PROCEDURE — 74011250637 HC RX REV CODE- 250/637: Performed by: HOSPITALIST

## 2019-07-13 PROCEDURE — 80048 BASIC METABOLIC PNL TOTAL CA: CPT

## 2019-07-13 PROCEDURE — 74011250636 HC RX REV CODE- 250/636: Performed by: HOSPITALIST

## 2019-07-13 PROCEDURE — 36415 COLL VENOUS BLD VENIPUNCTURE: CPT

## 2019-07-13 PROCEDURE — 97110 THERAPEUTIC EXERCISES: CPT

## 2019-07-13 RX ORDER — HYDRALAZINE HYDROCHLORIDE 20 MG/ML
10 INJECTION INTRAMUSCULAR; INTRAVENOUS
Status: DISCONTINUED | OUTPATIENT
Start: 2019-07-13 | End: 2019-07-18 | Stop reason: HOSPADM

## 2019-07-13 RX ORDER — PRAVASTATIN SODIUM 40 MG/1
80 TABLET ORAL
Status: DISCONTINUED | OUTPATIENT
Start: 2019-07-13 | End: 2019-07-18 | Stop reason: HOSPADM

## 2019-07-13 RX ADMIN — HEPARIN SODIUM 5000 UNITS: 5000 INJECTION INTRAVENOUS; SUBCUTANEOUS at 14:00

## 2019-07-13 RX ADMIN — CARVEDILOL 12.5 MG: 12.5 TABLET, FILM COATED ORAL at 07:10

## 2019-07-13 RX ADMIN — PRIMIDONE 100 MG: 50 TABLET ORAL at 08:09

## 2019-07-13 RX ADMIN — HYDRALAZINE HYDROCHLORIDE 25 MG: 25 TABLET ORAL at 19:15

## 2019-07-13 RX ADMIN — HEPARIN SODIUM 5000 UNITS: 5000 INJECTION INTRAVENOUS; SUBCUTANEOUS at 07:14

## 2019-07-13 RX ADMIN — Medication 10 ML: at 06:00

## 2019-07-13 RX ADMIN — DULOXETINE HYDROCHLORIDE 120 MG: 60 CAPSULE, DELAYED RELEASE ORAL at 08:09

## 2019-07-13 RX ADMIN — Medication 10 ML: at 22:43

## 2019-07-13 RX ADMIN — SODIUM CHLORIDE 100 ML/HR: 900 INJECTION, SOLUTION INTRAVENOUS at 04:38

## 2019-07-13 RX ADMIN — Medication 10 ML: at 14:00

## 2019-07-13 RX ADMIN — HYDROXYZINE HYDROCHLORIDE 50 MG: 25 TABLET, FILM COATED ORAL at 12:20

## 2019-07-13 RX ADMIN — HEPARIN SODIUM 5000 UNITS: 5000 INJECTION INTRAVENOUS; SUBCUTANEOUS at 22:43

## 2019-07-13 RX ADMIN — DULOXETINE HYDROCHLORIDE 120 MG: 60 CAPSULE, DELAYED RELEASE ORAL at 19:15

## 2019-07-13 RX ADMIN — LABETALOL 20 MG/4 ML (5 MG/ML) INTRAVENOUS SYRINGE 5 MG: at 04:12

## 2019-07-13 RX ADMIN — PRAVASTATIN SODIUM 80 MG: 40 TABLET ORAL at 22:43

## 2019-07-13 RX ADMIN — ASPIRIN 81 MG 81 MG: 81 TABLET ORAL at 08:09

## 2019-07-13 RX ADMIN — HYDROXYZINE HYDROCHLORIDE 50 MG: 25 TABLET, FILM COATED ORAL at 04:11

## 2019-07-13 RX ADMIN — ARIPIPRAZOLE 10 MG: 10 TABLET ORAL at 08:12

## 2019-07-13 RX ADMIN — CARVEDILOL 12.5 MG: 12.5 TABLET, FILM COATED ORAL at 16:28

## 2019-07-13 RX ADMIN — HYDROXYZINE HYDROCHLORIDE 50 MG: 25 TABLET, FILM COATED ORAL at 22:43

## 2019-07-13 RX ADMIN — HYDRALAZINE HYDROCHLORIDE 25 MG: 25 TABLET ORAL at 08:09

## 2019-07-13 RX ADMIN — PRIMIDONE 100 MG: 50 TABLET ORAL at 19:15

## 2019-07-13 RX ADMIN — PANTOPRAZOLE SODIUM 40 MG: 40 TABLET, DELAYED RELEASE ORAL at 07:10

## 2019-07-13 RX ADMIN — HYDROXYZINE HYDROCHLORIDE 50 MG: 25 TABLET, FILM COATED ORAL at 16:28

## 2019-07-13 NOTE — PROGRESS NOTES
responded to pt request. Pt shared that she feels overwhelmed with the health issues she has had. She mentioned that she is strong in her avtar but it has been a challenge. Pt shared that she has to nieces she used to be closer to though with family dynamics she feels a little alienated. Pt was hopeful of a friend who might visit today. She shared that it has felt lonely and appreciated talking with someone.  provided pastoral listening, support and prayer. Let pt know of  support and availability.  follow up as needed.      Renuka Ng, MACE   [phone])

## 2019-07-13 NOTE — PROGRESS NOTES
Patient Ilan Archibald denies chest pain SOB or discomfort at this time. She has bilaterally lower leg weakness up 1 assist with walker. 2300 no changes in patient's condition     0412  changes in patient's condition BP elevated Labetalol IVP  blood drawn and sent to lab    0521 BP retaken still elevated by within parameters for labetalol     0550 paged NP Ariella Ortiz for patient's elevated BP he called back we are just going to monitor and wait to see if it comes down more. 0730 Bedside and Verbal shift change report given to Dalton Rosenberg RN  (oncoming nurse) by Guerline Plasencia RN  (offgoing nurse). Report included the following information SBAR, MAR, Recent Results and Med Rec Status.

## 2019-07-13 NOTE — CDMP QUERY
#2 
Pt admitted with weakness/elevated troponin. Pt noted to have weight loss Huyen Toribio If possible, please document in the progress notes and d/c summary if you are evaluating and / or treating any of the following: Moderate Acute Protein Calorie Malnutrition Moderate Chronic  Protein Calorie Malnutrition Other, please specify Clinically unable to determine The medical record reflects the following: 
  Risk Factors: weight loss Clinical Indicators: nutritional consult Pt with 8% weight loss x 8 months, 4% x 5 months. Tricep/clavicle muscle wasting noted. Patient meets criteria for Moderate Protein Calorie Malnutrition as evidenced by:  
ASPEN Malnutrition Criteria Acute Illness, Chronic Illness, or Social/Enviornmental: Acute illness Energy Intake: <75% est energy req for > 7 days Muscle Mass: Mild ASPEN Malnutrition Score - Acute Illness: 2 Acute Illness - Malnutrition Diagnosis: Moderate Treatment: nutritional consult, weekly weights,Ensure Enlive BID Thank you, 
       Blayne Pradhan [facility], [address] [facility]

## 2019-07-13 NOTE — CDMP QUERY
Pt admitted with elevated troponin and  leg weakness/Pt noted to have stroke. If possible, please document in progress notes and d/c summary if Acute Ischemic Stroke  was POA: 
 
? Yes, Acute Ischemic Stroke was present at the time of the order to admit to the hospital 
? No, Acute Ischemic Stroke was not present on admission and developed during the inpatient stay ? Clinically you are unable to determine if Acute Ischemic Stroke was present on admission The medical record reflects the following: 
  Risk Factors: abnormal CT Clinical Indicators: H/P- 
Elevated Troponin 
- admit to inpatient 7/12-progress note Ambulatory dysfunction r/o central neurologic processes. PT assessment was concerning and they wondered if she could have neurologic cause for her sxs  
-Ordered mri brain and lumbar spine CT  
 
7/12-. Received critical report for a small left basal ganglia ischemic stroke. I came back to reassess patient during the code S. Patient clinical status has not changed from my exam earlier. Treatment: CT scan Thank you, 
       Abdi Espinoza [facility]

## 2019-07-13 NOTE — CONSULTS
Neurology Consult    Patient ID:  Angelica eBtancourt  [id number]  58 y.o.  1940    Subjective:     Date of Consultation:  July 13, 2019    Referring Physician: Kathy Nova MD    Reason for Consultation:  TIA/CVA    History of Present Illness:   Angelica Betancourt is a 67 y.o. white female who presents with c/o weakness in legs and difficulty in walking. This patient has a history of CAD with CABG and stenting, AF, DM. She lives alone. In the recent month she developed walking difficulty, weakness in legs, with instability and fall tendency. Sx worsens and presented to ER. MRI showed a small acute infarct in the left basal ganglia. CTa with LVO, 80% stenosis in left ICA. No change in bowel/bladder function. Some tingling in feet. Unsteady on standing and walking. No fainting. No change in visual function. Past Medical History:   Diagnosis Date    Anxiety disorder     Atrial fibrillation ([facility] [address]     CAD (coronary artery disease) 2007    stents, CABG x 3v    Chronic kidney disease     Depression     Diabetes ([facility] [address]     High cholesterol     History of peptic ulcer     Bleeding ulcer with increased NSAID use    Hypertension     MI, old 2007    Valvular heart disease       Past Surgical History:   Procedure Laterality Date    HX CORONARY ARTERY BYPASS GRAFT      HX ORTHOPAEDIC Right     HX TONSILLECTOMY        Family History   Problem Relation Age of Onset    Heart Attack Mother 72        Dec 91yo    Hypertension Mother     Other Father         Unknown    Parkinson's Disease Brother       Social History     Tobacco Use    Smoking status: Former Smoker     Types: Cigarettes    Smokeless tobacco: Never Used   Substance Use Topics    Alcohol use: Yes     Alcohol/week: 0.0 oz     Comment: Rare      No Known Allergies   Prior to Admission medications    Medication Sig Start Date End Date Taking?  Authorizing Provider   zolpidem (AMBIEN) 10 mg tablet Take 10 mg by mouth nightly as needed for Sleep. Yes Provider, Historical   cyanocobalamin (VITAMIN B12) 100 mcg tablet Take 100 mcg by mouth daily. Yes Provider, Historical   pravastatin (PRAVACHOL) 40 mg tablet Take 40 mg by mouth nightly. Indications: high cholesterol   Yes Provider, Historical   primidone (MYSOLINE) 50 mg tablet Take 2 Tabs by mouth two (2) times a day. 12/4/18  Yes Gabriella Cerda MD   hydrOXYzine HCl (ATARAX) 50 mg tablet Take 50 mg by mouth every six (6) hours. Yes Provider, Historical   ARIPiprazole (ABILIFY) 10 mg tablet Take 10 mg by mouth daily. Indications: Additional Medications to Treat Depression   Yes Provider, Historical   HYDRALAZINE HCL (APRESOLINE PO) Take 25 mg by mouth two (2) times a day. Indications: hypertension   Yes Provider, Historical   methocarbamol (ROBAXIN) 750 mg tablet Take 750 mg by mouth three (3) times daily. Indications: muscle spasm   Yes Provider, Historical   carvedilol (COREG) 12.5 mg tablet Take 1 Tab by mouth two (2) times daily (with meals). Patient taking differently: Take 12.5 mg by mouth two (2) times daily (with meals). Indications: Dysfunction of Left Ventricle of Heart following Heart Attack, Ventricular Rate Control in Atrial Fibrillation 7/25/16  Yes Rojelio Batista PA-C   Cholecalciferol, Vitamin D3, 1,000 unit cap Take 1,000 Units by mouth daily. Yes Provider, Historical   LACTOBACILLUS RHAMNOSUS GG (CULTURELLE PO) Take 1 Tab by mouth daily. Yes Provider, Historical   pantoprazole (PROTONIX) 40 mg tablet Take 40 mg by mouth Daily (before breakfast). Indications: h/o bleeding ulcer with nsaid   Yes Provider, Historical   DULoxetine (CYMBALTA) 60 mg capsule Take 120 mg by mouth two (2) times a day. Indications: Anxiousness associated with Depression   Yes Provider, Historical   multivitamins-minerals-lutein (CENTRUM SILVER) tab tablet Take 1 Tab by mouth daily. Yes Provider, Historical   diazePAM (VALIUM) 2 mg tablet Take  by mouth every six (6) hours as needed for Anxiety. Indications: anxious    Provider, Historical   vit C,X-Un-dvuhg-lutein-zeaxan (PRESERVISION AREDS-2) 077-640-50-1 mg-unit-mg-mg cap capsule Take 1 Cap by mouth two (2) times a day. Provider, Historical   senna-docusate (SENNA WITH DOCUSATE SODIUM) 8.6-50 mg per tablet Take 1 Tab by mouth daily as needed for Constipation. Provider, Historical       Review of Systems:   See the hospitals for neurologic and I reviewed all other pertinent review of systems as the this chart, otherwise the following systems are noncontributory including constitutional, eyes, ears, nose, and throat, cardiovascular, respiratory, gastrointestinal, genitourinary, musculoskeletal, skin and/ endocrine, hematologic/lymph, allergic/immunologic and psychiatric      Objective:     Patient Vitals for the past 8 hrs:   BP Temp Pulse Resp SpO2   07/13/19 1207 167/72 98.3 °F (36.8 °C) (!) 56 16 97 %       Neurological Exam:  Mental Status: No distress, awake/alert, oriented to all. Right handed with normal speech. Intact memory. Slightly anxious. Decreased facial expression, slight bradykinetic. Cranial Nerves:   Intact visual fields. Fundi are benign. PERRL, EOM's full, no nystagmus, no ptosis. Facial sensation is normal. Facial movement is symmetric. Palate is midline. Normal sternocleidomastoid strength, except limited should ROM due to shoulder joint DJD. Tongue is midline. Hearing is intact bilaterally. Motor:  Age normal strength in upper and lower proximal and distal muscles. Mild resting tremor and rigidity, josé miguel in right. Reflexes:   Deep tendon reflexes quite exaggerated, but symmetrical.   Sensory:   Normal to temperature, light touch and pinprick. Gait:  Quite unsteady, small pace   Tremor:   Mild resting tremor noted. Cerebellar:  No cerebellar signs present. CTA head  1. At least 80% stenosis proximal left internal carotid artery stenosis.   2. Approximately 50% stenosis right proximal internal carotid artery stenosis. 3. Occlusion nondominant distal left vertebral artery. Diffuse atherosclerosis left vertebral artery. 4.  mild stenosis origin right vertebral artery. MRI brain: small acute infarct in the left basal ganglia. Assessment:     Principal Problem:    Acute ischemic stroke ([address] (7/12/2019)    Active Problems:    Elevated troponin (6/3/2014)      1. Acute ischemic infarct, left basal ganglia, small  2 Left ICA stenosis, 80%  3. Right ICA stenosis, 50%  4. Vertebral artery atherosclerosis  5. Gait disorder, with exaggerated DTR, r/o cervical myelopathy  6  ?  Parkinson disease, early      Plan:   CEA is indicated   MRI cervical spinal cord      Signed By:  Jessica Morejon MD     July 13, 2019

## 2019-07-13 NOTE — PROGRESS NOTES
Radiology  CTA head  IMPRESSION:  1. At least 80% stenosis proximal left internal carotid artery stenosis. 2. Approximately 50% stenosis right proximal internal carotid artery stenosis. 3. Occlusion nondominant distal left vertebral artery. Diffuse atherosclerosis left vertebral artery. 4. Please mild stenosis origin right vertebral artery.

## 2019-07-13 NOTE — PROGRESS NOTES
Problem: Mobility Impaired (Adult and Pediatric)  Goal: *Acute Goals and Plan of Care (Insert Text)  Description  Physical Therapy Goals   7/12/2019  1. Patient will move from supine to sit and sit to supine , scoot up and down and roll side to side in bed with supervision/set-up within 7 day(s). 2.  Patient will transfer from bed to chair and chair to bed with supervision/set-up using the least restrictive device within 7 day(s). 3.  Patient will perform sit to stand with supervision/set-up within 7 day(s). 4.  Patient will ambulate with supervision/set-up for 100 feet with the least restrictive device within 7 day(s). Outcome: Progressing Towards Goal   PHYSICAL THERAPY TREATMENT  Patient: Fortunato Wilson (59 y.o. female)  Date: 7/13/2019  Diagnosis: Elevated troponin [R74.8] Acute ischemic stroke [facility])       Precautions:    Chart, physical therapy assessment, plan of care and goals were reviewed. ASSESSMENT:  Patient is received seated at EOB. She is Min A for sit<>stand for safety and coordination. Patient requires VC for keeping the walker close to her and with her while turning. Patient ambulates 30 ft to and from the restroom. She demonstrates decreased bilateral step length with shuffling gait, decreased bilateral floor clearance, and decreased coordination. She demonstrates two incidence of minor LOB requiring Min A for correction. Patient performs standing therex for improved strength and coordination. Please see below. Patient would greatly benefit from IPR on D/C secondary patient operating far below baseline, lives alone, and has a hx of repeated falls in the home. Progression toward goals:  ?    Improving appropriately and progressing toward goals  ? Improving slowly and progressing toward goals  ? Not making progress toward goals and plan of care will be adjusted     PLAN:  Patient continues to benefit from skilled intervention to address the above impairments.   Continue treatment per established plan of care. Discharge Recommendations:  Inpatient Rehab  Further Equipment Recommendations for Discharge:  TBD by rehab      SUBJECTIVE:   Patient stated ?my legs are just so weak. ?    OBJECTIVE DATA SUMMARY:   Critical Behavior:  Neurologic State: Alert, Appropriate for age  Orientation Level: Oriented X4  Cognition: Appropriate decision making, Appropriate safety awareness, Follows commands  Safety/Judgement: Awareness of environment  Functional Mobility Training:  Bed Mobility:                    Transfers:  Sit to Stand: Minimum assistance  Stand to Sit: Minimum assistance                             Balance:  Sitting: Intact  Standing: Impaired  Standing - Static: Constant support; Fair  Standing - Dynamic : Constant support;Good;Poor  Ambulation/Gait Training:  Distance (ft): 30 Feet (ft)  Assistive Device: Walker, rolling;Gait belt  Ambulation - Level of Assistance: Minimal assistance        Gait Abnormalities: Decreased step clearance;Shuffling gait        Base of Support: Narrowed     Speed/Samira: Pace decreased (<100 feet/min); Shuffled  Step Length: Right shortened;Left shortened                    Stairs:              Neuro Re-Education:    Therapeutic Exercises:   Sit to stands x5  Standing heel raises with RW for support and CGA x10  Standing single leg marches with RW for support and CGA x10 on each leg  Marching x10 with RW for support and CGA    Pain:  Pain Scale 1: Numeric (0 - 10)  Pain Intensity 1: 0              Activity Tolerance:   Patient demonstrates good activity tolerance and is able to tolerate 3 hours of therapy a day. Please refer to the flowsheet for vital signs taken during this treatment. After treatment:   ?    Patient left in no apparent distress sitting up in chair  ? Patient left in no apparent distress in bed  ? Call bell left within reach  ? Nursing notified  ? Caregiver present  ?     Bed alarm activated    COMMUNICATION/COLLABORATION: The patient?s plan of care was discussed with: Registered Nurse    Gary Taylor, PT   Time Calculation: 23 mins

## 2019-07-13 NOTE — PROGRESS NOTES
Problem: Falls - Risk of  Goal: *Absence of Falls  Description  Document Sasha Vasquez Fall Risk and appropriate interventions in the flowsheet.   Outcome: Progressing Towards Goal     Problem: Depressed Mood (Adult/Pediatric)  Goal: *STG: Attends activities and groups  Outcome: Progressing Towards Goal  Goal: *STG: Remains safe in hospital  Outcome: Progressing Towards Goal  Goal: *STG: Complies with medication therapy  Outcome: Progressing Towards Goal  Goal: *LTG: Returns to previous level of functioning and participates with after care plan  Outcome: Progressing Towards Goal  Goal: Interventions  Outcome: Progressing Towards Goal     Problem: Patient Education: Go to Patient Education Activity  Goal: Patient/Family Education  Outcome: Progressing Towards Goal     Problem: Patient Education: Go to Patient Education Activity  Goal: Patient/Family Education  Outcome: Progressing Towards Goal     Problem: TIA/CVA Stroke: 0-24 hours  Goal: Discharge Planning  Outcome: Progressing Towards Goal  Goal: Psychosocial  Outcome: Progressing Towards Goal  Goal: *Hemodynamically stable  Outcome: Progressing Towards Goal  Goal: *Neurologically stable  Description  Absence of additional neurological deficits    Outcome: Progressing Towards Goal  Goal: *Verbalizes anxiety and depression are reduced or absent  Outcome: Progressing Towards Goal  Goal: *Absence of Signs of Aspiration on Current Diet  Outcome: Progressing Towards Goal  Goal: *Absence of deep venous thrombosis signs and symptoms(Stroke Metric)  Outcome: Progressing Towards Goal  Goal: *Ability to perform ADLs and demonstrates progressive mobility and function  Outcome: Progressing Towards Goal  Goal: *Stroke education started(Stroke Metric)  Outcome: Progressing Towards Goal  Goal: *Dysphagia screen performed(Stroke Metric)  Outcome: Progressing Towards Goal  Goal: *Rehab consulted(Stroke Metric)  Outcome: Progressing Towards Goal     Problem: TIA/CVA Stroke: Day 2 Until Discharge  Goal: Off Pathway (Use only if patient is Off Pathway)  Outcome: Progressing Towards Goal  Goal: Activity/Safety  Outcome: Progressing Towards Goal  Goal: Diagnostic Test/Procedures  Outcome: Progressing Towards Goal  Goal: Nutrition/Diet  Outcome: Progressing Towards Goal  Goal: Discharge Planning  Outcome: Progressing Towards Goal  Goal: Medications  Outcome: Progressing Towards Goal  Goal: Respiratory  Outcome: Progressing Towards Goal  Goal: Treatments/Interventions/Procedures  Outcome: Progressing Towards Goal  Goal: Psychosocial  Outcome: Progressing Towards Goal  Goal: *Verbalizes anxiety and depression are reduced or absent  Outcome: Progressing Towards Goal  Goal: *Absence of aspiration  Outcome: Progressing Towards Goal  Goal: *Absence of deep venous thrombosis signs and symptoms(Stroke Metric)  Outcome: Progressing Towards Goal  Goal: *Optimal pain control at patient's stated goal  Outcome: Progressing Towards Goal  Goal: *Tolerating diet  Outcome: Progressing Towards Goal  Goal: *Ability to perform ADLs and demonstrates progressive mobility and function  Outcome: Progressing Towards Goal  Goal: *Stroke education continued(Stroke Metric)  Outcome: Progressing Towards Goal     Problem: Ischemic Stroke: Discharge Outcomes  Goal: *Verbalizes anxiety and depression are reduced or absent  Outcome: Progressing Towards Goal  Goal: *Verbalize understanding of risk factor modification(Stroke Metric)  Outcome: Progressing Towards Goal  Goal: *Hemodynamically stable  Outcome: Progressing Towards Goal  Goal: *Absence of aspiration pneumonia  Outcome: Progressing Towards Goal  Goal: *Aware of needed dietary changes  Outcome: Progressing Towards Goal  Goal: *Verbalize understanding of prescribed medications including anti-coagulants, anti-lipid, and/or anti-platelets(Stroke Metric)  Outcome: Progressing Towards Goal  Goal: *Tolerating diet  Outcome: Progressing Towards Goal  Goal: *Aware of follow-up diagnostics related to anticoagulants  Outcome: Progressing Towards Goal  Goal: *Ability to perform ADLs and demonstrates progressive mobility and function  Outcome: Progressing Towards Goal  Goal: *Absence of DVT(Stroke Metric)  Outcome: Progressing Towards Goal  Goal: *Absence of aspiration  Outcome: Progressing Towards Goal  Goal: *Optimal pain control at patient's stated goal  Outcome: Progressing Towards Goal  Goal: *Home safety concerns addressed  Outcome: Progressing Towards Goal  Goal: *Describes available resources and support systems  Outcome: Progressing Towards Goal  Goal: *Verbalizes understanding of activation of EMS(911) for stroke symptoms(Stroke Metric)  Outcome: Progressing Towards Goal  Goal: *Understands and describes signs and symptoms to report to providers(Stroke Metric)  Outcome: Progressing Towards Goal  Goal: *Neurolgocially stable (absence of additional neurological deficits)  Outcome: Progressing Towards Goal  Goal: *Verbalizes importance of follow-up with primary care physician(Stroke Metric)  Outcome: Progressing Towards Goal  Goal: *Smoking cessation discussed,if applicable(Stroke Metric)  Outcome: Progressing Towards Goal  Goal: *Depression screening completed(Stroke Metric)  Outcome: Progressing Towards Goal

## 2019-07-13 NOTE — PROGRESS NOTES
Hospitalist Progress Note  Lucian Garcia MD  Answering service: [phone]    Date of Service:  2019  NAME:  Michelle Gray                                                         :  1940                                               MRN:  [id number]    Brief admission summary   66y old female patient with PMH of CAD s/ CABG x3, DM, HTN, HLD, CKD, anxiety and chronic left shoulder s/p dislocation presented to Ed for evaluation of bilateral leg weakness. difficulty ambulation,  weak,the left more than the right. Subjective/interval history    Ongoing B/L Lextweakness has progressed ,PT eval, has shuffling gait ,denies speech or swallow deficits    Assessment and plan      Ambulatory dysfunction   Acute CVA Lt basal ganglia,noted on MRI   -initial CTAshow 80% stenosis,prox LT ICA,50% stenosis RT prox ICA,occlusion non dominant distal Lt vertebral artery,diffuse atherosclerosis  -MRI brain,acute small infarct in Lt basal ganglia  -on  ASA,statin  -LDL 90,inrease statin, hgba1c,TSH normal  -echo with no ASD/PFO,EF normal  -allow persmissive HTN, 140-160  -tele neuro consulted,no intervention  -neurology consult  -PT/OT. Elevated Troponin,non specific. She has no chest pain.  -troponin is minimally elevated and is flat. Echocardiogram w/o preserved EF and no wall motion abnormality. EKG- stable, no ST- T changes. Cardiology consulted,trop marginal/falt not c/w MI  Echo show normal EF,no regional WMA    HTN- BP accelerated.  C/w coreg, hydralazine  -add IV hydralazine, with parameters         Hx CAD s/p CABG  - home meds coreg, statin        GERD - protonix  CKD- cr 1.2 , near baseline, will monitor      Left shoulder pain- chronic     Anxiety - c/w home meds  Abilify, Cymbalta, hydroxyzine      Weight loss,moderated chronic protein calorie maturation  -nutrition consult       DVT ppx: heparin  Code status: Full - patient has no living will  Disposition: pt resides in [place],she is unsafe to return home. Work is underway. She likely needs rehab. Current facility administered and prior to admit medications reviewed. x         Review of Systems:  A comprehensive review of systems was negative except for that written in the HPI. PHYSICAL EXAM:  O:  Visit Vitals  /82 (BP 1 Location: Right arm, BP Patient Position: At rest)   Pulse (!) 58   Temp 98.1 °F (36.7 °C)   Resp 16   Ht 5' 5\" (1.651 m)   Wt 71.8 kg (158 lb 4.6 oz)   SpO2 97%   Breastfeeding? No   BMI 26.34 kg/m²       General Appears comfortable,not in distress. HEENT Head atraumatic. Unicteric sclera. Moist buccal mucosa. PERRLA     CVS RRR, no MRG, no JVD, no peripheral edema       Chest No deformity  No accessory muscle use  Vesicular air entry symmetrically  No wheezing,ronchi or crepitations       Abdomen &Pelvis Not distended. Normoactive. Soft. Non tender. No hepatomegally       Genitourinary  No CVA or suprapubic tenderness       Musculoskeletal limited rom left shoulder due to previous fall. Skin No erythema,rash,depigmentation       Neurology Mental status: alert and oriented x4  Cranial nerves: CN 2-12 intact. Non focal,generalized weakness. b/l Lext          Intake/Output Summary (Last 24 hours) at 7/13/2019 0854  Last data filed at 7/13/2019 0403  Gross per 24 hour   Intake 240 ml   Output 850 ml   Net -610 ml          Recent labs & imaging reviewed:    Problem List as of 7/13/2019 Date Reviewed: 2/12/2019          Codes Class Noted - Resolved    * (Principal) Acute ischemic stroke [facility]) ICD-10-CM: I63.9  ICD-9-CM: 434.91  7/12/2019 - Present        Fall ICD-10-CM: W19. Asa Polk  ICD-9-CM: E888.9  12/24/2018 - Present        CKD (chronic kidney disease), stage III ([facility] ICD-10-CM: N18.3  ICD-9-CM: 585.3  7/8/2015 - Present        Edema ICD-10-CM: R60.9  ICD-9-CM: 782.3  5/6/2015 - Present        Diabetes mellitus ([facility] ICD-10-CM: E11.9  ICD-9-CM: 250.00  5/6/2015 - Present        Postoperative anemia due to acute blood loss ICD-10-CM: D62  ICD-9-CM: 285.1  2/14/2015 - Present        S/P CABG (coronary artery bypass graft) ICD-10-CM: Z95.1  ICD-9-CM: V45.81  2/13/2015 - Present        Syncope ICD-10-CM: R55  ICD-9-CM: 780.2  2/9/2015 - Present        Bradycardia ICD-10-CM: R00.1  ICD-9-CM: 427.89  2/9/2015 - Present        MORENO (acute kidney injury) ([facility] ICD-10-CM: N17.9  ICD-9-CM: 584.9  2/9/2015 - Present        Anemia ICD-10-CM: D64.9  ICD-9-CM: 285.9  9/9/2014 - Present        GI bleed ICD-10-CM: K92.2  ICD-9-CM: 578.9  9/9/2014 - Present        AF (atrial fibrillation) ([facility]) ICD-10-CM: I48.91  ICD-9-CM: 427.31  6/20/2014 - Present        Hypotension ICD-10-CM: I95.9  ICD-9-CM: 458.9  6/3/2014 - Present        CAD (coronary artery disease) ICD-10-CM: I25.10  ICD-9-CM: 414.00  6/3/2014 - Present    Overview Signed 2/10/2015 11:06 AM by Joel Fernandez     2007 PCI x2 to LAD with STEPHAN             S/P coronary artery stent placement ICD-10-CM: Z95.5  ICD-9-CM: V45.82  6/3/2014 - Present        Renal failure ICD-10-CM: N19  ICD-9-CM: 834  6/3/2014 - Present        Elevated troponin ICD-10-CM: R74.8  ICD-9-CM: 790.6  6/3/2014 - Present        Pericardial effusion ICD-10-CM: I31.3  ICD-9-CM: 423.9  6/3/2014 - Present        Lactic acidosis ICD-10-CM: E87.2  ICD-9-CM: 276.2  6/3/2014 - Present                Shikha Rodriguez MD    Date of Service: 7/13/2019

## 2019-07-14 ENCOUNTER — APPOINTMENT (OUTPATIENT)
Dept: MRI IMAGING | Age: 79
DRG: 065 | End: 2019-07-14
Attending: PSYCHIATRY & NEUROLOGY
Payer: MEDICARE

## 2019-07-14 PROCEDURE — 72141 MRI NECK SPINE W/O DYE: CPT

## 2019-07-14 PROCEDURE — 65660000000 HC RM CCU STEPDOWN

## 2019-07-14 PROCEDURE — 74011250637 HC RX REV CODE- 250/637: Performed by: FAMILY MEDICINE

## 2019-07-14 PROCEDURE — 74011250636 HC RX REV CODE- 250/636: Performed by: INTERNAL MEDICINE

## 2019-07-14 PROCEDURE — 74011250637 HC RX REV CODE- 250/637: Performed by: HOSPITALIST

## 2019-07-14 PROCEDURE — 74011250637 HC RX REV CODE- 250/637: Performed by: INTERNAL MEDICINE

## 2019-07-14 PROCEDURE — 74011250636 HC RX REV CODE- 250/636: Performed by: FAMILY MEDICINE

## 2019-07-14 RX ORDER — LORAZEPAM 2 MG/ML
1 INJECTION INTRAMUSCULAR ONCE
Status: COMPLETED | OUTPATIENT
Start: 2019-07-14 | End: 2019-07-14

## 2019-07-14 RX ORDER — CLOPIDOGREL BISULFATE 75 MG/1
75 TABLET ORAL DAILY
Status: DISCONTINUED | OUTPATIENT
Start: 2019-07-14 | End: 2019-07-18 | Stop reason: HOSPADM

## 2019-07-14 RX ADMIN — CLOPIDOGREL BISULFATE 75 MG: 75 TABLET ORAL at 17:21

## 2019-07-14 RX ADMIN — PRIMIDONE 100 MG: 50 TABLET ORAL at 08:16

## 2019-07-14 RX ADMIN — ASPIRIN 81 MG 81 MG: 81 TABLET ORAL at 08:16

## 2019-07-14 RX ADMIN — HYDRALAZINE HYDROCHLORIDE 25 MG: 25 TABLET ORAL at 08:16

## 2019-07-14 RX ADMIN — Medication 10 ML: at 14:00

## 2019-07-14 RX ADMIN — PANTOPRAZOLE SODIUM 40 MG: 40 TABLET, DELAYED RELEASE ORAL at 06:45

## 2019-07-14 RX ADMIN — ARIPIPRAZOLE 10 MG: 10 TABLET ORAL at 08:16

## 2019-07-14 RX ADMIN — CARVEDILOL 12.5 MG: 12.5 TABLET, FILM COATED ORAL at 17:22

## 2019-07-14 RX ADMIN — HYDROXYZINE HYDROCHLORIDE 50 MG: 25 TABLET, FILM COATED ORAL at 06:45

## 2019-07-14 RX ADMIN — HEPARIN SODIUM 5000 UNITS: 5000 INJECTION INTRAVENOUS; SUBCUTANEOUS at 13:52

## 2019-07-14 RX ADMIN — HYDRALAZINE HYDROCHLORIDE 25 MG: 25 TABLET ORAL at 17:21

## 2019-07-14 RX ADMIN — HYDRALAZINE HYDROCHLORIDE 10 MG: 20 INJECTION INTRAMUSCULAR; INTRAVENOUS at 01:03

## 2019-07-14 RX ADMIN — HYDROXYZINE HYDROCHLORIDE 50 MG: 25 TABLET, FILM COATED ORAL at 17:21

## 2019-07-14 RX ADMIN — Medication 10 ML: at 22:00

## 2019-07-14 RX ADMIN — LORAZEPAM 1 MG: 2 INJECTION INTRAMUSCULAR; INTRAVENOUS at 08:13

## 2019-07-14 RX ADMIN — HYDROXYZINE HYDROCHLORIDE 50 MG: 25 TABLET, FILM COATED ORAL at 11:00

## 2019-07-14 RX ADMIN — Medication 10 ML: at 06:00

## 2019-07-14 RX ADMIN — DULOXETINE HYDROCHLORIDE 120 MG: 60 CAPSULE, DELAYED RELEASE ORAL at 08:16

## 2019-07-14 RX ADMIN — DULOXETINE HYDROCHLORIDE 120 MG: 60 CAPSULE, DELAYED RELEASE ORAL at 17:22

## 2019-07-14 RX ADMIN — CARVEDILOL 12.5 MG: 12.5 TABLET, FILM COATED ORAL at 08:22

## 2019-07-14 RX ADMIN — PRIMIDONE 100 MG: 50 TABLET ORAL at 17:22

## 2019-07-14 RX ADMIN — HEPARIN SODIUM 5000 UNITS: 5000 INJECTION INTRAVENOUS; SUBCUTANEOUS at 06:45

## 2019-07-14 RX ADMIN — CARVEDILOL 12.5 MG: 12.5 TABLET, FILM COATED ORAL at 06:45

## 2019-07-14 NOTE — CONSULTS
[address]    Name:  Kavita Heredia  MR#:  [id number]  :  1940  ACCOUNT #:  [de-identified]  DATE OF SERVICE:  2019    REASON FOR CONSULTATION:  Carotid stenosis. HISTORY OF PRESENT ILLNESS:  Briefly, this is a 27-year-old woman who came in because of progressive weakness in her legs. She has had other issues with atherosclerosis with previous coronary artery disease with CABG and also stenting. She has a history of atrial fibrillation and diabetes. As per of her evaluation in the emergency room, she had a CTA and an MRI. MRI showed a small acute infarct in the left basal ganglia. CTA showed at least 80-90% proximal left internal carotid extracranial stenosis and 50% stenosis on the other side. The patient also has some occluded intracranial vertebral artery. As per of the evaluation, she is due to also to have an MRI of her neck and back tomorrow morning. PAST MEDICAL HISTORY:  As noted diabetes, chronic kidney disease, coronary artery disease, atrial fibrillation, hypertension. MEDICATIONS:  Include Ambien, vitamin B12, pravastatin, Mysoline, Atarax, Abilify, hydralazine for blood pressure, Robaxin, and Coreg. REVIEW OF SYSTEMS:  CARDIAC:  No chest pain. RESPIRATORY:  No shortness of breath. GENITOURINARY:  No dysuria. GASTROINTESTINAL:  No change in bowel habits. NEUROLOGY:  Her  main complaint is not focal weakness in her hand or face or vision, but in her legs generally bilaterally. PHYSICAL EXAMINATION:  HEENT:  Relatively normal.  NECK:  She has carotid bruit on the left. LUNGS:  Clear. HEART:  Rhythm and rate are regular. ABDOMEN:  Soft. No aneurysm. EXTREMITIES:  Upper extremity femoral pulses and posterior tibial pulses are intact. NEUROLOGIC:  Her cranial nerves are intact. She has no drift.     ASSESSMENT AND PLAN:  Review of her CTA and at least the verbal portion of her MRI confirms acute left basal ganglia stroke and critical left carotid stenosis. The patient should be on aspirin and Plavix. We need to wait a few days before consideration of either TCAR or left carotid endarterectomy because of her stroke and critical stenosis. I have explained the situation to the patient. We will follow closely with you.       Glory Hager MD      [id number]  D:  07/13/2019 20:31  T:  07/13/2019 22:31  JOB #:  [id number]  CC:  DO Sandip Chapa MD Davina Rio, MD

## 2019-07-14 NOTE — PROGRESS NOTES
Patient resting in bed BP is still elevated IVP Hydralazine to be given to try to decrease. 0103 Hydralazine IVP given. Will continue to monitor    0866 patient assisted to bathroom CHG bath was given patient's BP is still elevated in the 180's will continue to monitor    0730 Bedside and Verbal shift change report given to Glenn Weiner RN  (oncoming nurse) by Michelle Bah RN  (offgoing nurse). Report included the following information SBAR, MAR, Recent Results and Med Rec Status.

## 2019-07-14 NOTE — PROGRESS NOTES
Vascular  Consult dictated  68 y/o with leg weakness and acute left basal ganglia infarct with critical left ICA stenosis. Will need a few days of \"recovery\" prior to left CEA vs TCAR of the left carotid. Discussed at length with patient.

## 2019-07-14 NOTE — PROGRESS NOTES
Occupational Therapy 0732 -   07.14.2019    Orders acknowledged and chart reviewed in prep for OT evaluation. Note patient evaluated by OT on 7/12/2019 with recommendation of SNF level rehab when stable. Will continue to follow patient per OT POC. Thank you. Randy Gonzalez MS, OTR/L

## 2019-07-14 NOTE — PROGRESS NOTES
Hospitalist Progress Note  Bhakti Elkins MD  Answering service: [phone]    Date of Service:  2019  NAME:  Dimple Cheng                                                         :  1940                                               MRN:  [id number]    Brief admission summary   66y old female patient with PMH of CAD s/ CABG x3, DM, HTN, HLD, CKD, anxiety and chronic left shoulder s/p dislocation presented to Ed for evaluation of bilateral leg weakness. difficulty ambulation,  weak,the left more than the right. Subjective/interval history    Pt reports more rested today  Ongoing B/L Lextweakness has progressed ,PT eval has shuffling gait ,denies     Assessment and plan      Ambulatory dysfunction   Acute CVA Lt basal ganglia,noted on MRI   -initial CTAshow 80% stenosis,prox LT ICA,50% stenosis     Occlusion distal non dominating vertebral artery with  diffuse     atherosclerosis  -MRI brain,acute small infarct in Lt basal ganglia  -on ASA,added plavix, LDL 90,increased statin  -echo with no ASD/PFO,EF normal  -allow persmissive HTN, 140-160  -tele neuro consulted,no intervention  -neurology consult, suspect early PD,MRI C-Spine, IV ativan given pt moving a lot  -PT/OT. Critical LT ICA 80%  -vascular Sx consult  -will need time to recover given acute stroke before CEA/TCAR  -plavix added     Elevated Troponin,non specific.  -troponin is minimally elevated ,with trend downand is flat. Echo w/o preserved EF and no wall motion abnormality  EKG- stable, no ST- T changes.   Cardiology consulted,trop marginal/falt not c/w MI      HTN-   -BP improving, goal 140-160  - C/w coreg, hydralazine  -add IV hydralazine, with parameters    Hx CAD s/p CABG  - home meds coreg, statin      GERD - protonix    CKD lll - cr 1.2 , near baseline, will monitor      Left shoulder pain- chronic     Anxiety - c/w home meds  Abilify, Cymbalta, hydroxyzine       with weight loss,moderate chronic protein calorie maturation  -nutrition consult       DVT ppx: heparin  Code status: Full -   Disposition: PT/OT recc IPR  pt resides in [place],she is unsafe to return home    Current facility administered and prior to admit medications reviewed. x         Review of Systems:  A comprehensive review of systems was negative except for that written in the HPI. PHYSICAL EXAM:  O:  Visit Vitals  /72 (BP 1 Location: Right arm, BP Patient Position: At rest)   Pulse 71   Temp 97.9 °F (36.6 °C)   Resp 16   Ht 5' 5\" (1.651 m)   Wt 71.4 kg (157 lb 6.5 oz)   SpO2 98%   Breastfeeding? No   BMI 26.19 kg/m²       General Appears comfortable,not in distress. HEENT Head atraumatic. Unicteric sclera. Moist buccal mucosa. PERRLA     CVS RRR, no MRG, no JVD, no peripheral edema       Chest No deformity  No accessory muscle use  Vesicular air entry symmetrically  No wheezing,ronchi or crepitations       Abdomen &Pelvis Not distended. Normoactive. Soft. Non tender. No hepatomegally       Genitourinary  No CVA or suprapubic tenderness       Musculoskeletal limited rom left shoulder due to previous fall. Skin No erythema,rash,depigmentation       Neurology Mental status: alert and oriented x4  Cranial nerves: CN 2-12 intact. Non focal,generalized weakness. b/l Lext          Intake/Output Summary (Last 24 hours) at 7/14/2019 1532  Last data filed at 7/13/2019 2351  Gross per 24 hour   Intake    Output 700 ml   Net -700 ml          Recent labs & imaging reviewed:    Problem List as of 7/14/2019 Date Reviewed: 2/12/2019          Codes Class Noted - Resolved    * (Principal) Acute ischemic stroke [facility]) ICD-10-CM: I63.9  ICD-9-CM: 434.91  7/12/2019 - Present        Fall ICD-10-CM: W19. Ismael Rosales  ICD-9-CM: E888.9  12/24/2018 - Present        CKD (chronic kidney disease), stage III ([facility] ICD-10-CM: N18.3  ICD-9-CM: 585.3  7/8/2015 - Present        Edema ICD-10-CM: R60.9  ICD-9-CM: 782.3  5/6/2015 - Present        Diabetes mellitus ([facility] ICD-10-CM: E11.9  ICD-9-CM: 250.00  5/6/2015 - Present        Postoperative anemia due to acute blood loss ICD-10-CM: D62  ICD-9-CM: 285.1  2/14/2015 - Present        S/P CABG (coronary artery bypass graft) ICD-10-CM: Z95.1  ICD-9-CM: V45.81  2/13/2015 - Present        Syncope ICD-10-CM: R55  ICD-9-CM: 780.2  2/9/2015 - Present        Bradycardia ICD-10-CM: R00.1  ICD-9-CM: 427.89  2/9/2015 - Present        MORENO (acute kidney injury) ([facility] ICD-10-CM: N17.9  ICD-9-CM: 584.9  2/9/2015 - Present        Anemia ICD-10-CM: D64.9  ICD-9-CM: 285.9  9/9/2014 - Present        GI bleed ICD-10-CM: K92.2  ICD-9-CM: 578.9  9/9/2014 - Present        AF (atrial fibrillation) ([facility]) ICD-10-CM: I48.91  ICD-9-CM: 427.31  6/20/2014 - Present        Hypotension ICD-10-CM: I95.9  ICD-9-CM: 458.9  6/3/2014 - Present        CAD (coronary artery disease) ICD-10-CM: I25.10  ICD-9-CM: 414.00  6/3/2014 - Present    Overview Signed 2/10/2015 11:06 AM by Freddy Yeh     2007 PCI x2 to LAD with STEPHAN             S/P coronary artery stent placement ICD-10-CM: Z95.5  ICD-9-CM: V45.82  6/3/2014 - Present        Renal failure ICD-10-CM: N19  ICD-9-CM: 055  6/3/2014 - Present        Elevated troponin ICD-10-CM: R74.8  ICD-9-CM: 790.6  6/3/2014 - Present        Pericardial effusion ICD-10-CM: I31.3  ICD-9-CM: 423.9  6/3/2014 - Present        Lactic acidosis ICD-10-CM: E87.2  ICD-9-CM: 276.2  6/3/2014 - Present                Sybil Mckeon MD    Date of Service: 7/14/2019

## 2019-07-14 NOTE — PROGRESS NOTES
Problem: Falls - Risk of  Goal: *Absence of Falls  Description  Document Christiano Suerodemi Fall Risk and appropriate interventions in the flowsheet.   Outcome: Progressing Towards Goal     Problem: Depressed Mood (Adult/Pediatric)  Goal: *STG: Attends activities and groups  Outcome: Progressing Towards Goal     Problem: Depressed Mood (Adult/Pediatric)  Goal: *STG: Remains safe in hospital  Outcome: Progressing Towards Goal     Problem: Depressed Mood (Adult/Pediatric)  Goal: *STG: Complies with medication therapy  Outcome: Progressing Towards Goal     Problem: Depressed Mood (Adult/Pediatric)  Goal: *LTG: Returns to previous level of functioning and participates with after care plan  Outcome: Progressing Towards Goal     Problem: Depressed Mood (Adult/Pediatric)  Goal: Interventions  Outcome: Progressing Towards Goal     Problem: TIA/CVA Stroke: Day 2 Until Discharge  Goal: Off Pathway (Use only if patient is Off Pathway)  Outcome: Progressing Towards Goal  Variance Off Pathway (add note)  Note:   Patient still has a shuffle gait and weakness in legs but is working very hard to continue to progress

## 2019-07-15 PROCEDURE — 74011250637 HC RX REV CODE- 250/637: Performed by: INTERNAL MEDICINE

## 2019-07-15 PROCEDURE — 74011250637 HC RX REV CODE- 250/637: Performed by: FAMILY MEDICINE

## 2019-07-15 PROCEDURE — 65660000000 HC RM CCU STEPDOWN

## 2019-07-15 PROCEDURE — 74011250637 HC RX REV CODE- 250/637: Performed by: HOSPITALIST

## 2019-07-15 PROCEDURE — 74011250636 HC RX REV CODE- 250/636: Performed by: INTERNAL MEDICINE

## 2019-07-15 PROCEDURE — 92610 EVALUATE SWALLOWING FUNCTION: CPT | Performed by: SPEECH-LANGUAGE PATHOLOGIST

## 2019-07-15 PROCEDURE — 74011250637 HC RX REV CODE- 250/637: Performed by: NURSE PRACTITIONER

## 2019-07-15 PROCEDURE — 97535 SELF CARE MNGMENT TRAINING: CPT

## 2019-07-15 PROCEDURE — 74011250636 HC RX REV CODE- 250/636: Performed by: FAMILY MEDICINE

## 2019-07-15 RX ORDER — HYDRALAZINE HYDROCHLORIDE 50 MG/1
50 TABLET, FILM COATED ORAL 3 TIMES DAILY
Status: DISCONTINUED | OUTPATIENT
Start: 2019-07-15 | End: 2019-07-17

## 2019-07-15 RX ORDER — CLONIDINE HYDROCHLORIDE 0.1 MG/1
0.1 TABLET ORAL
Status: COMPLETED | OUTPATIENT
Start: 2019-07-15 | End: 2019-07-15

## 2019-07-15 RX ORDER — POLYVINYL ALCOHOL 14 MG/ML
1 SOLUTION/ DROPS OPHTHALMIC AS NEEDED
Status: DISCONTINUED | OUTPATIENT
Start: 2019-07-15 | End: 2019-07-18 | Stop reason: HOSPADM

## 2019-07-15 RX ADMIN — HYDRALAZINE HYDROCHLORIDE 10 MG: 20 INJECTION INTRAMUSCULAR; INTRAVENOUS at 00:11

## 2019-07-15 RX ADMIN — PRAVASTATIN SODIUM 80 MG: 40 TABLET ORAL at 00:09

## 2019-07-15 RX ADMIN — CARVEDILOL 12.5 MG: 12.5 TABLET, FILM COATED ORAL at 07:28

## 2019-07-15 RX ADMIN — HYDROXYZINE HYDROCHLORIDE 50 MG: 25 TABLET, FILM COATED ORAL at 00:09

## 2019-07-15 RX ADMIN — Medication 10 ML: at 06:00

## 2019-07-15 RX ADMIN — HYDROXYZINE HYDROCHLORIDE 50 MG: 25 TABLET, FILM COATED ORAL at 18:13

## 2019-07-15 RX ADMIN — PRAVASTATIN SODIUM 80 MG: 40 TABLET ORAL at 21:48

## 2019-07-15 RX ADMIN — DULOXETINE HYDROCHLORIDE 120 MG: 60 CAPSULE, DELAYED RELEASE ORAL at 18:12

## 2019-07-15 RX ADMIN — PANTOPRAZOLE SODIUM 40 MG: 40 TABLET, DELAYED RELEASE ORAL at 07:28

## 2019-07-15 RX ADMIN — HYDRALAZINE HYDROCHLORIDE 25 MG: 25 TABLET ORAL at 08:39

## 2019-07-15 RX ADMIN — HYDROXYZINE HYDROCHLORIDE 50 MG: 25 TABLET, FILM COATED ORAL at 12:54

## 2019-07-15 RX ADMIN — CLOPIDOGREL BISULFATE 75 MG: 75 TABLET ORAL at 08:39

## 2019-07-15 RX ADMIN — HYDROXYZINE HYDROCHLORIDE 50 MG: 25 TABLET, FILM COATED ORAL at 23:17

## 2019-07-15 RX ADMIN — HEPARIN SODIUM 5000 UNITS: 5000 INJECTION INTRAVENOUS; SUBCUTANEOUS at 07:28

## 2019-07-15 RX ADMIN — Medication 10 ML: at 18:18

## 2019-07-15 RX ADMIN — BENZALKONIUM CHLORIDE, SODIUM PHOSPHATE, DIBASIC, EDETATE DISODIUM,SODIUM PHOSPHATE, MONOBASIC, SODIUM CHLORIDE, WATER, PHOSPHORIC ACID, SODIUM HYDROXIDE 1 DROP: 14 SOLUTION INTRAOCULAR at 21:48

## 2019-07-15 RX ADMIN — ZOLPIDEM TARTRATE 10 MG: 5 TABLET ORAL at 00:09

## 2019-07-15 RX ADMIN — HEPARIN SODIUM 5000 UNITS: 5000 INJECTION INTRAVENOUS; SUBCUTANEOUS at 21:51

## 2019-07-15 RX ADMIN — HYDRALAZINE HYDROCHLORIDE 50 MG: 50 TABLET ORAL at 18:18

## 2019-07-15 RX ADMIN — PRIMIDONE 100 MG: 50 TABLET ORAL at 18:13

## 2019-07-15 RX ADMIN — DULOXETINE HYDROCHLORIDE 120 MG: 60 CAPSULE, DELAYED RELEASE ORAL at 08:39

## 2019-07-15 RX ADMIN — HYDRALAZINE HYDROCHLORIDE 50 MG: 50 TABLET ORAL at 12:50

## 2019-07-15 RX ADMIN — CARVEDILOL 12.5 MG: 12.5 TABLET, FILM COATED ORAL at 18:13

## 2019-07-15 RX ADMIN — ARIPIPRAZOLE 10 MG: 10 TABLET ORAL at 08:46

## 2019-07-15 RX ADMIN — Medication 10 ML: at 21:52

## 2019-07-15 RX ADMIN — HEPARIN SODIUM 5000 UNITS: 5000 INJECTION INTRAVENOUS; SUBCUTANEOUS at 18:17

## 2019-07-15 RX ADMIN — HYDROXYZINE HYDROCHLORIDE 50 MG: 25 TABLET, FILM COATED ORAL at 04:31

## 2019-07-15 RX ADMIN — HYDRALAZINE HYDROCHLORIDE 50 MG: 50 TABLET ORAL at 21:48

## 2019-07-15 RX ADMIN — HEPARIN SODIUM 5000 UNITS: 5000 INJECTION INTRAVENOUS; SUBCUTANEOUS at 00:09

## 2019-07-15 RX ADMIN — ASPIRIN 81 MG 81 MG: 81 TABLET ORAL at 08:39

## 2019-07-15 RX ADMIN — CLONIDINE HYDROCHLORIDE 0.1 MG: 0.1 TABLET ORAL at 05:04

## 2019-07-15 RX ADMIN — PRIMIDONE 100 MG: 50 TABLET ORAL at 08:39

## 2019-07-15 RX ADMIN — ZOLPIDEM TARTRATE 10 MG: 5 TABLET ORAL at 21:48

## 2019-07-15 NOTE — PROGRESS NOTES
Problem: Self Care Deficits Care Plan (Adult)  Goal: *Acute Goals and Plan of Care (Insert Text)  Description  Occupational Therapy Goals  Initiated 7/12/2019  1. Patient will perform bathing with modified independence within 7 day(s). 2.  Patient will perform lower body dressing with modified independence within 7 day(s). 3.  Patient will perform grooming with independence within 7 day(s). 4.  Patient will perform toilet transfers with modified independence within 7 day(s). 5.  Patient will perform all aspects of toileting with modified independence within 7 day(s). 6.  Patient will participate in upper extremity therapeutic exercise/activities with supervision/set-up for 10 minutes within 7 day(s). 7.  Patient will utilize energy conservation techniques during functional activities with verbal cues within 7 day(s). Outcome: Progressing Towards Goal   OCCUPATIONAL THERAPY TREATMENT  Patient: Leonel Norman (8 y.o. female)  Date: 7/15/2019  Diagnosis: Elevated troponin [R74.8] Acute ischemic stroke [facility])       Precautions:    Chart, occupational therapy assessment, plan of care, and goals were reviewed. ASSESSMENT:   The patient presents with Setup upper body ADLs, Setup and Contact guard assistance lower body ADLs, and Contact guard assistance assist functional mobility and RW. The following are barriers to ADL independence while in acute care:   - Cognitive and/or behavioral: none   - Medical condition: strength, functional reach, functional endurance, standing balance, medical history and coordination    - Other: Patient instructed and cued on use of RW as she leaves it to the side with mobility to bathroom and chair. Prior level of function: independent and living alone. Has a RW, but does not use in the apartment per report         PLAN:  Patient continues to benefit from skilled intervention to address the above impairments.   Continue treatment per established plan of care.  Recommend with staff: Dairl Mitesh in chair for all meals, toileting in bathroom with RW and CGA, self care with set up to SBA. Recommend next OT session: grooming in standing at sink, safety with RW for self care  Discharge recommendations: Rehab at skilled nursing facility (SNF) (to regain functional baseline patient requires rehab)  If above is not an option then recommend: Home health (to increase independence and safety)  24 supervision  physical assist during all functional mobility    Barriers to discharging home, in addition to above listed impairments: lives alone  total assist driving to follow up medical appointment(s)/groceries/obtain medication  level of physical assist required to maintain patient safety. Equipment recommendations for successful discharge (if) home: TBD      SUBJECTIVE:   Patient stated ? they told me I have something wrong in my back. ?    OBJECTIVE DATA SUMMARY:   Cognitive/Behavioral Status:  Neurologic State: Alert; Appropriate for age  Orientation Level: Oriented X4  Cognition: Appropriate decision making; Follows commands  Perception: Appears intact  Perseveration: No perseveration noted  Safety/Judgement: Awareness of environment    Functional Mobility and Transfers for ADLs:  Bed Mobility:  Supine to Sit: Modified independent(HOB elevated approx. 30 degrees)    Transfers:  Sit to Stand: Contact guard assistance;Assist x1  Functional Transfers  Toilet Transfer : Contact guard assistance;Assist x1       Balance:  Sitting: Intact  Standing: Impaired; With support  Standing - Static: Fair  Standing - Dynamic : Fair    ADL Intervention:       Grooming  Washing Hands: Contact guard assistance(standing at sink)    Upper Body Bathing  Bathing Assistance: Set-up(in simulation)  Position Performed: Seated edge of bed    Lower Body Bathing  Bathing Assistance: Contact guard assistance(in simulation)  Perineal  : Contact guard assistance  Position Performed: Standing  Cues: Verbal cues provided  Lower Body : Set-up  Position Performed: Seated edge of bed         Lower Body Dressing Assistance  Socks: Set-up  Leg Crossed Method Used: Yes  Position Performed: Bending forward method;Seated edge of bed;Standing  Adaptive Equipment Used: Walker    Toileting  Bladder Hygiene: Supervision  Bowel Hygiene: Supervision  Clothing Management: Supervision(gown and underwear)  Cues: Verbal cues provided(safety with mobility and use of RW)  Adaptive Equipment: Walker;Grab bars    Cognitive Retraining  Organizing/Sequencing: Breaking task down  Attention to Task: Distractibility  Following Commands: Follows one step commands/directions  Safety/Judgement: Awareness of environment  Cues: Verbal cues provided      Activity Tolerance:   Fair  Please refer to the flowsheet for vital signs taken during this treatment.     After treatment patient left:   Up in chair  Bed alarm/tab alert on  Bed/Chair-wheels locked  Call light within reach  RN notified     COMMUNICATION/COLLABORATION:   The patient?s plan of care was discussed with: Registered Nurse    IVETTE Mitchell  Time Calculation: 28 mins

## 2019-07-15 NOTE — ADVANCED PRACTICE NURSE
Sleep disorders: She states at her apartment she does get up frequently to void at night. She does take sleeping pill Ambien 10 mg prn. Discussed with her about use of sleeping medications as we age. Problems with eating or feeding: She has been doing her own grocery shopping. There is transportation to and from grocery store and her apartment but her last scheduled trip for this last week she knew she was to weak so she did not go. She has difficulty preparing meal, so eats sandwiches, microwavable meals. Incontinence: Voiding clear yellow urine. Stool noted on 7.14.19    Confusion:  Alert, oriented to self, date, time and place. Talked about her brother, parents and grandparents. 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 She likes to read and do needle work  but can't see as well to do either of these. She has nieces, but doesn't want to burden them. Appears to be a very independent person and is very afraid of falling with her frequent fall history, (dislocated shoulder from a fall 7 months ago)     Evidence of Falls: Maylin score discussed with nurse. Patient does have a history of many falls. Has had right knee surgery and has chronic pain in left knee. She has chronic back pain. Appears to very afraid of falling which is impacting her life. Skin breakdown: Gregorio score 20    Plan: Discussions about fall prevention while hospitalized. Safety measures in place. Chair alarm in place. Non slip socks on, call bell and phone in reach. Mobilize to chair, encourage strength exercises. Offer therapeutic activity. Purvi Purvis RN, MSN, CNS-BC, Gerontology  [phone]    Note that patient takes 10 mg Ambien prn  at home and would discuss with physician to recommend decreasing to 5 mg with her fall history, weakness and current hospital stay.

## 2019-07-15 NOTE — PROGRESS NOTES
Speech Pathology bedside swallow evaluation/discharge  Patient: New Jeter (79 y.o. female)  Date: 7/15/2019  Primary Diagnosis: Elevated troponin [R74.8]       Precautions: fall        ASSESSMENT :  Based on the objective data described below, the patient presents with no oral or pharyngeal dysphagia with timely and complete mastication, timely swallow initiation and functional hyolaryngeal elevation/excursion via palpation. No s/s of aspiration observed. Patient without concerns regarding speech or language function and carries on a fluent and appropriate conversation. Skilled acute therapy provided by a speech-language pathologist is not indicated at this time. PLAN :  Recommendations:  --regular diet. No further SLP needs   Discharge Recommendations: None     SUBJECTIVE:   Patient stated I really came in because I couldn't walk right. It's a little depressing.     OBJECTIVE:     Past Medical History:   Diagnosis Date    Anxiety disorder     Atrial fibrillation (HCC)     CAD (coronary artery disease) 2007    stents, CABG x 3v    Chronic kidney disease     Depression     Diabetes ([facility] [address]     High cholesterol     History of peptic ulcer     Bleeding ulcer with increased NSAID use    Hypertension     MI, old 2007    Valvular heart disease      Past Surgical History:   Procedure Laterality Date    HX CORONARY ARTERY BYPASS GRAFT      HX ORTHOPAEDIC Right     HX TONSILLECTOMY       Prior Level of Function/Home Situation:   Home Situation  Home Environment: Apartment  # Steps to Enter: 0  One/Two Story Residence: Two story, live on 1st floor  # of Interior Steps: (none)  Height of Each Step (in): (none)  [employer]: (none)  Lift Chair Available: No  Living Alone: Yes  Support Systems: Zoroastrianism / avtar community, Friends \ neighbors, Family member(s)  Patient Expects to be Discharged to[de-identified] Independent living facility  Current DME Used/Available at Home: Cane, straight, Walker, rolling, Grab bars  Tub or Shower Type: Shower  Diet prior to admission: regular  Current Diet:  Regular    Cognitive and Communication Status:  Neurologic State: Alert, Appropriate for age  Orientation Level: Oriented X4  Cognition: Appropriate decision making, Follows commands  Perception: Appears intact  Perseveration: No perseveration noted  Safety/Judgement: Awareness of environment  Oral Assessment:  Oral Assessment  Labial: No impairment  Dentition: Natural  Oral Hygiene: moist mucosa   Lingual: No impairment  Mandible: No impairment  P.O. Trials:  Patient Position: upright in chair   Vocal quality prior to P.O.: No impairment  Consistency Presented: Thin liquid; Solid;Puree  How Presented: Self-fed/presented;Straw;Successive swallows;Spoon     Bolus Acceptance: No impairment  Bolus Formation/Control: No impairment     Propulsion: No impairment  Oral Residue: None  Initiation of Swallow: No impairment  Laryngeal Elevation: Functional  Aspiration Signs/Symptoms: None  Pharyngeal Phase Characteristics: No impairment, issues, or problems              Oral Phase Severity: No impairment  Pharyngeal Phase Severity : No impairment  NOMS:   The NOMS functional outcome measure was used to quantify this patient's level of swallowing impairment. Based on the NOMS, the patient was determined to be at level 7 for swallow function     NOMS Swallowing Levels:  Level 1 (CN): NPO  Level 2 (CM): NPO but takes consistency in therapy  Level 3 (CL): Takes less than 50% of nutrition p.o. and continues with nonoral feedings; and/or safe with mod cues; and/or max diet restriction  Level 4 (CK):  Safe swallow but needs mod cues; and/or mod diet restriction; and/or still requires some nonoral feeding/supplements  Level 5 (CJ): Safe swallow with min diet restriction; and/or needs min cues  Level 6 (CI): Independent with p.o.; rare cues; usually self cues; may need to avoid some foods or needs extra time  Level 7 [facility]): Independent for all trinh SEPULVEDA. (2003). National Outcomes Measurement System (NOMS): Adult Speech-Language Pathology User's Guide. Pain:  Pain Scale 1: Numeric (0 - 10)  Pain Intensity 1: 0     After treatment:   [x] Patient left in no apparent distress sitting up in chair  [] Patient left in no apparent distress in bed  [x] Call bell left within reach  [x] Nursing notified  [x] Caregiver present  [] Bed alarm activated    COMMUNICATION/EDUCATION:   The patients plan of care including findings, recommendations, and recommended diet changes were discussed with: Registered Nurse. [x] Patient/family have participated as able and agree with findings and recommendations. [] Patient is unable to participate in plan of care at this time. Thank you for this referral.  Reji Ardon M.CD.  CCC-SLP   Time Calculation: 13 mins

## 2019-07-15 NOTE — CONSULTS
Pt admitted with acute left bg infarct. 80% carotid stenosis. On aspirin and plavix. Appropriately being scheduled for carotid surgery by Wanda Gómez. Chronic spondylitic stenosis of c spine on mri which is markedly degenerated by motion artifact. Surgical intervention on her cervical spine is contra-indicated at this time. She is welcome to follow up in 3 months.   thx

## 2019-07-15 NOTE — PROGRESS NOTES
Problem: Falls - Risk of  Goal: *Absence of Falls  Description  Document Kendal Hernandez Fall Risk and appropriate interventions in the flowsheet.   Outcome: Progressing Towards Goal     Problem: Depressed Mood (Adult/Pediatric)  Goal: *STG: Attends activities and groups  Outcome: Progressing Towards Goal  Goal: *STG: Remains safe in hospital  Outcome: Progressing Towards Goal  Goal: *STG: Complies with medication therapy  Outcome: Progressing Towards Goal  Goal: *LTG: Returns to previous level of functioning and participates with after care plan  Outcome: Progressing Towards Goal  Goal: Interventions  Outcome: Progressing Towards Goal     Problem: Chronic Renal Failure  Goal: *Fluid and electrolytes stabilized  Outcome: Progressing Towards Goal     Problem: Patient Education: Go to Patient Education Activity  Goal: Patient/Family Education  Outcome: Progressing Towards Goal     Problem: Patient Education: Go to Patient Education Activity  Goal: Patient/Family Education  Outcome: Progressing Towards Goal     Problem: TIA/CVA Stroke: 0-24 hours  Goal: Discharge Planning  Outcome: Progressing Towards Goal  Goal: Psychosocial  Outcome: Progressing Towards Goal  Goal: *Hemodynamically stable  Outcome: Progressing Towards Goal  Goal: *Neurologically stable  Description  Absence of additional neurological deficits    Outcome: Progressing Towards Goal  Goal: *Verbalizes anxiety and depression are reduced or absent  Outcome: Progressing Towards Goal  Goal: *Absence of Signs of Aspiration on Current Diet  Outcome: Progressing Towards Goal  Goal: *Absence of deep venous thrombosis signs and symptoms(Stroke Metric)  Outcome: Progressing Towards Goal  Goal: *Ability to perform ADLs and demonstrates progressive mobility and function  Outcome: Progressing Towards Goal  Goal: *Stroke education started(Stroke Metric)  Outcome: Progressing Towards Goal  Goal: *Dysphagia screen performed(Stroke Metric)  Outcome: Progressing Towards Goal  Goal: *Rehab consulted(Stroke Metric)  Outcome: Progressing Towards Goal     Problem: TIA/CVA Stroke: Day 2 Until Discharge  Goal: Off Pathway (Use only if patient is Off Pathway)  Outcome: Progressing Towards Goal

## 2019-07-15 NOTE — PROGRESS NOTES
Patient is resting in bed AOX4 walks with 1 assist walker and she has a shuffled gait no pain concerns at this time. [phone] [id number] patient is resting in bed     0442 paged NP Rashad Hendrickson for meds for HTN He called back and he entered order of Clonidine 0.1 mg Now    0725 patient's BP is still elevated after Clonidine    0730 Bedside and Verbal shift change report given to Kaela Vick RN  (oncoming nurse) by Oleg Sullivan RN (offgoing nurse). Report included the following information SBAR, MAR, Recent Results and Med Rec Status.

## 2019-07-15 NOTE — PROGRESS NOTES
Hospitalist Progress Note  Myla Rehman MD  Answering service: [phone]    Date of Service:  7/15/2019  NAME:  Jose Manjarrez                                                         :  1940                                               MRN:  [id number]    Brief admission summary   66y old female patient with PMH of CAD s/ CABG x3, DM, HTN, HLD, CKD, anxiety and chronic left shoulder s/p dislocation presented to Ed for evaluation of bilateral leg weakness. difficulty ambulation,  weak,the left more than the right. Subjective/interval history      Ongoing B/L Lextweakness  has shuffling gait, PT recc IPR    Assessment and plan      Ambulatory dysfunction   Acute CVA Lt basal ganglia,noted on MRI   -initial CTAshow 80% stenosis,prox LT ICA,50% stenosis     Occlusion distal non dominating vertebral artery with  diffuse       atherosclerosis  -MRI brain,acute small infarct in Lt basal ganglia  -on ASA,added plavix, LDL 90,increased statin  -tele neuro consulted,no intervention  -echo normal  -Neuro following,MRI C-spine with multilevel DDD C3-C7   with C4-C5  With mod severe canal stenosis  -consult surgery  -PT/OT. recc IP rehab    Critical LT ICA 80%  -vascular Sx consult  -will need time to recover ~plan Sx if  intpt -wed versus outp  -c/w asa ,plavix    Elevated Troponin,non specific.  -minimally elevated ,with trend downand is flat. Echo w/o preserved EF and no wall motion abnormality  EKG- stable, no ST- T changes.   Cardiology consulted,trop marginal/falt not c/w MI      HTN- uncontrolled  - C/w coreg, hydralazine  -add IV hydralazine, with parameters    Hx CAD s/p CABG  - home meds coreg, statin      GERD - protonix    CKD lll - cr 1.2 , near baseline, will monitor      Left shoulder pain- chronic     Anxiety - c/w home meds  Abilify, Cymbalta, hydroxyzine       with weight loss,moderate chronic protein calorie maturation  -nutrition consult       DVT ppx: heparin  Code status: Full -   Disposition: PT/OT recc IPR  pt resides in skilled nursing,she is unsafe to return home    Current facility administered and prior to admit medications reviewed. x         Review of Systems:  A comprehensive review of systems was negative except for that written in the HPI. PHYSICAL EXAM:  O:  Visit Vitals  /77   Pulse 69   Temp 98 °F (36.7 °C)   Resp 16   Ht 5' 5\" (1.651 m)   Wt 72.1 kg (158 lb 15.2 oz)   SpO2 94%   Breastfeeding? No   BMI 26.45 kg/m²       General Appears comfortable,not in distress. HEENT Head atraumatic. Unicteric sclera. Moist buccal mucosa. PERRLA     CVS RRR, no MRG, no JVD, no peripheral edema       Chest No deformity  No accessory muscle use  Vesicular air entry symmetrically  No wheezing,ronchi or crepitations       Abdomen &Pelvis Not distended. Normoactive. Soft. Non tender. No hepatomegally       Genitourinary  No CVA or suprapubic tenderness       Musculoskeletal limited rom left shoulder due to previous fall. Skin No erythema,rash,depigmentation       Neurology Mental status: alert and oriented x4  Cranial nerves: CN 2-12 intact. Non focal,generalized weakness. b/l Lext          Intake/Output Summary (Last 24 hours) at 7/15/2019 1028  Last data filed at 7/15/2019 0955  Gross per 24 hour   Intake    Output 1600 ml   Net -1600 ml          Recent labs & imaging reviewed:    Problem List as of 7/15/2019 Date Reviewed: 2/12/2019          Codes Class Noted - Resolved    * (Principal) Acute ischemic stroke [facility]) ICD-10-CM: I63.9  ICD-9-CM: 434.91  7/12/2019 - Present        Fall ICD-10-CM: W19. Poonam Campos  ICD-9-CM: E888.9  12/24/2018 - Present        CKD (chronic kidney disease), stage III ([facility] ICD-10-CM: N18.3  ICD-9-CM: 585.3  7/8/2015 - Present        Edema ICD-10-CM: R60.9  ICD-9-CM: 782.3  5/6/2015 - Present        Diabetes mellitus ([facility] ICD-10-CM: E11.9  ICD-9-CM: 250.00  5/6/2015 - Present        Postoperative anemia due to acute blood loss ICD-10-CM: D62  ICD-9-CM: 285.1  2/14/2015 - Present        S/P CABG (coronary artery bypass graft) ICD-10-CM: Z95.1  ICD-9-CM: V45.81  2/13/2015 - Present        Syncope ICD-10-CM: R55  ICD-9-CM: 780.2  2/9/2015 - Present        Bradycardia ICD-10-CM: R00.1  ICD-9-CM: 427.89  2/9/2015 - Present        MORENO (acute kidney injury) ([facility] [address] ICD-10-CM: N17.9  ICD-9-CM: 584.9  2/9/2015 - Present        Anemia ICD-10-CM: D64.9  ICD-9-CM: 285.9  9/9/2014 - Present        GI bleed ICD-10-CM: K92.2  ICD-9-CM: 578.9  9/9/2014 - Present        AF (atrial fibrillation) ([facility]) ICD-10-CM: I48.91  ICD-9-CM: 427.31  6/20/2014 - Present        Hypotension ICD-10-CM: I95.9  ICD-9-CM: 458.9  6/3/2014 - Present        CAD (coronary artery disease) ICD-10-CM: I25.10  ICD-9-CM: 414.00  6/3/2014 - Present    Overview Signed 2/10/2015 11:06 AM by Cecilia Prabhakar     2007 PCI x2 to LAD with STEPHAN             S/P coronary artery stent placement ICD-10-CM: Z95.5  ICD-9-CM: V45.82  6/3/2014 - Present        Renal failure ICD-10-CM: N19  ICD-9-CM: 032  6/3/2014 - Present        Elevated troponin ICD-10-CM: R74.8  ICD-9-CM: 790.6  6/3/2014 - Present        Pericardial effusion ICD-10-CM: I31.3  ICD-9-CM: 423.9  6/3/2014 - Present        Lactic acidosis ICD-10-CM: E87.2  ICD-9-CM: 276.2  6/3/2014 - Present                La Adame MD    Date of Service: 7/15/2019

## 2019-07-15 NOTE — PROGRESS NOTES
Have attempted x2 this morning to see patient. First with CRM, now with OT. Will follow later today for formal assessment. Thanks. Abraham Mcqueen M.CD.  CCC-SLP

## 2019-07-15 NOTE — PROGRESS NOTES
Vascular  MRI results noted with central disc in mid cspine. Needs left CEA. Will send home first or post for Wednesday mid day. The patient understands the need for her carotid endarterectomy and the potential risks and complicalions.

## 2019-07-15 NOTE — PROGRESS NOTES
Reason for Admission: Elevated Troponin                 RRAT Score: 29- HIGH                 Resources/supports as identified by patient/family:                   Top Challenges facing patient (as identified by patient/family and CM): Finances/Medication cost? Pt insured by [employer]. Pt uses [address] and reports no issues with getting medications at this time. Pt reports she receives SSI ($1520.80) and a savings account. Transportation? Pt will need transportation home after discharge. Support system or lack thereof? Pt has two nieces as emergency contacts. Living arrangements? Pt lives alone in an apartment at [facility]. Pt has been living at [facility] [facility] for the past 6 years. Self-care/ADLs/Cognition? Pt reports IADLs but reports she is unable to complete ADLS very well. Pt reports she has stopped cooking stating \"I try to make it on sandwiches and frozen dinners. CM discussed [employer] to assist with meal delivery; pt agreeable. Pt uses a walker and a cane at home           Current Advanced Directive/Advance Care Plan: Pt has an AMD on file. Plan for utilizing home health: TBD                    Transition of Care Plan: CM met with pt at bedside to discuss CM role and to assess pt needs. CM verified demographic information including insurance and emergency contacts. Pt verified PCP and reports last visit was two weeks ago. Pt reports concerns about frequent falls and concerns with walking. CM discussed transitioning to assisted living and reports concerns regarding cost. CM offered [employer] to assist with discussing living needs and assisted living options; pt agreeable. Care Management Interventions  PCP Verified by CM: Yes  Palliative Care Criteria Met (RRAT>21 & CHF Dx)?: No  Mode of Transport at Discharge:  Other (see comment)(Pt will need assistance returning home after discharge)  Transition of Care Consult (CM Consult):  Other, Discharge Planning(Initial Assessment)  MyChart Signup: No  Discharge Durable Medical Equipment: No  Physical Therapy Consult: Yes  Occupational Therapy Consult: Yes  Speech Therapy Consult: Yes  Current Support Network: Lives Alone, Other  Confirm Follow Up Transport: Other (see comment)  Plan discussed with Pt/Family/Caregiver: Yes  Discharge Location  Discharge Placement: Home(Disposition TBD/subject to change pending care recommendations)    Marie Wiley RN, BSN  Care Management Department

## 2019-07-16 ENCOUNTER — HOME HEALTH ADMISSION (OUTPATIENT)
Dept: HOME HEALTH SERVICES | Facility: HOME HEALTH | Age: 79
End: 2019-07-16

## 2019-07-16 PROCEDURE — 74011250637 HC RX REV CODE- 250/637: Performed by: FAMILY MEDICINE

## 2019-07-16 PROCEDURE — 65660000000 HC RM CCU STEPDOWN

## 2019-07-16 PROCEDURE — 74011250637 HC RX REV CODE- 250/637: Performed by: INTERNAL MEDICINE

## 2019-07-16 PROCEDURE — 97116 GAIT TRAINING THERAPY: CPT

## 2019-07-16 PROCEDURE — 74011250637 HC RX REV CODE- 250/637: Performed by: HOSPITALIST

## 2019-07-16 PROCEDURE — 74011250636 HC RX REV CODE- 250/636: Performed by: INTERNAL MEDICINE

## 2019-07-16 PROCEDURE — 97535 SELF CARE MNGMENT TRAINING: CPT

## 2019-07-16 RX ADMIN — HYDRALAZINE HYDROCHLORIDE 50 MG: 50 TABLET ORAL at 08:52

## 2019-07-16 RX ADMIN — HEPARIN SODIUM 5000 UNITS: 5000 INJECTION INTRAVENOUS; SUBCUTANEOUS at 21:22

## 2019-07-16 RX ADMIN — HYDROXYZINE HYDROCHLORIDE 50 MG: 25 TABLET, FILM COATED ORAL at 16:00

## 2019-07-16 RX ADMIN — PANTOPRAZOLE SODIUM 40 MG: 40 TABLET, DELAYED RELEASE ORAL at 07:20

## 2019-07-16 RX ADMIN — ARIPIPRAZOLE 10 MG: 10 TABLET ORAL at 10:24

## 2019-07-16 RX ADMIN — HYDROXYZINE HYDROCHLORIDE 50 MG: 25 TABLET, FILM COATED ORAL at 10:23

## 2019-07-16 RX ADMIN — CLOPIDOGREL BISULFATE 75 MG: 75 TABLET ORAL at 08:52

## 2019-07-16 RX ADMIN — HEPARIN SODIUM 5000 UNITS: 5000 INJECTION INTRAVENOUS; SUBCUTANEOUS at 05:53

## 2019-07-16 RX ADMIN — PRAVASTATIN SODIUM 80 MG: 40 TABLET ORAL at 21:22

## 2019-07-16 RX ADMIN — Medication 10 ML: at 21:25

## 2019-07-16 RX ADMIN — DULOXETINE HYDROCHLORIDE 120 MG: 60 CAPSULE, DELAYED RELEASE ORAL at 18:14

## 2019-07-16 RX ADMIN — DULOXETINE HYDROCHLORIDE 120 MG: 60 CAPSULE, DELAYED RELEASE ORAL at 08:52

## 2019-07-16 RX ADMIN — PRIMIDONE 100 MG: 50 TABLET ORAL at 08:52

## 2019-07-16 RX ADMIN — CARVEDILOL 12.5 MG: 12.5 TABLET, FILM COATED ORAL at 08:52

## 2019-07-16 RX ADMIN — HYDROXYZINE HYDROCHLORIDE 50 MG: 25 TABLET, FILM COATED ORAL at 21:25

## 2019-07-16 RX ADMIN — Medication 10 ML: at 05:53

## 2019-07-16 RX ADMIN — HYDRALAZINE HYDROCHLORIDE 50 MG: 50 TABLET ORAL at 21:22

## 2019-07-16 RX ADMIN — ZOLPIDEM TARTRATE 10 MG: 5 TABLET ORAL at 22:31

## 2019-07-16 RX ADMIN — HEPARIN SODIUM 5000 UNITS: 5000 INJECTION INTRAVENOUS; SUBCUTANEOUS at 15:57

## 2019-07-16 RX ADMIN — CARVEDILOL 12.5 MG: 12.5 TABLET, FILM COATED ORAL at 16:00

## 2019-07-16 RX ADMIN — ASPIRIN 81 MG 81 MG: 81 TABLET ORAL at 08:52

## 2019-07-16 RX ADMIN — HYDRALAZINE HYDROCHLORIDE 50 MG: 50 TABLET ORAL at 15:58

## 2019-07-16 RX ADMIN — PRIMIDONE 100 MG: 50 TABLET ORAL at 18:15

## 2019-07-16 RX ADMIN — HYDROXYZINE HYDROCHLORIDE 50 MG: 25 TABLET, FILM COATED ORAL at 05:52

## 2019-07-16 NOTE — PROGRESS NOTES
Problem: Falls - Risk of  Goal: *Absence of Falls  Description  Document Margrett Bernheim Fall Risk and appropriate interventions in the flowsheet.   Outcome: Progressing Towards Goal     Problem: Depressed Mood (Adult/Pediatric)  Goal: *STG: Attends activities and groups  Outcome: Progressing Towards Goal  Goal: *STG: Remains safe in hospital  Outcome: Progressing Towards Goal  Goal: *STG: Complies with medication therapy  Outcome: Progressing Towards Goal  Goal: *LTG: Returns to previous level of functioning and participates with after care plan  Outcome: Progressing Towards Goal  Goal: Interventions  Outcome: Progressing Towards Goal     Problem: Chronic Renal Failure  Goal: *Fluid and electrolytes stabilized  Outcome: Progressing Towards Goal     Problem: Patient Education: Go to Patient Education Activity  Goal: Patient/Family Education  Outcome: Progressing Towards Goal     Problem: Patient Education: Go to Patient Education Activity  Goal: Patient/Family Education  Outcome: Progressing Towards Goal     Problem: Ischemic Stroke: Discharge Outcomes  Goal: *Verbalizes anxiety and depression are reduced or absent  Outcome: Progressing Towards Goal  Goal: *Verbalize understanding of risk factor modification(Stroke Metric)  Outcome: Progressing Towards Goal  Goal: *Hemodynamically stable  Outcome: Progressing Towards Goal  Goal: *Absence of aspiration pneumonia  Outcome: Progressing Towards Goal  Goal: *Aware of needed dietary changes  Outcome: Progressing Towards Goal  Goal: *Verbalize understanding of prescribed medications including anti-coagulants, anti-lipid, and/or anti-platelets(Stroke Metric)  Outcome: Progressing Towards Goal  Goal: *Tolerating diet  Outcome: Progressing Towards Goal  Goal: *Aware of follow-up diagnostics related to anticoagulants  Outcome: Progressing Towards Goal  Goal: *Ability to perform ADLs and demonstrates progressive mobility and function  Outcome: Progressing Towards Goal  Goal: *Absence of DVT(Stroke Metric)  Outcome: Progressing Towards Goal  Goal: *Absence of aspiration  Outcome: Progressing Towards Goal  Goal: *Optimal pain control at patient's stated goal  Outcome: Progressing Towards Goal  Goal: *Home safety concerns addressed  Outcome: Progressing Towards Goal  Goal: *Describes available resources and support systems  Outcome: Progressing Towards Goal  Goal: *Verbalizes understanding of activation of EMS(911) for stroke symptoms(Stroke Metric)  Outcome: Progressing Towards Goal  Goal: *Understands and describes signs and symptoms to report to providers(Stroke Metric)  Outcome: Progressing Towards Goal  Goal: *Neurolgocially stable (absence of additional neurological deficits)  Outcome: Progressing Towards Goal  Goal: *Verbalizes importance of follow-up with primary care physician(Stroke Metric)  Outcome: Progressing Towards Goal  Goal: *Smoking cessation discussed,if applicable(Stroke Metric)  Outcome: Progressing Towards Goal  Goal: *Depression screening completed(Stroke Metric)  Outcome: Progressing Towards Goal

## 2019-07-16 NOTE — PROGRESS NOTES
0830: patient requesting eye drops for dry eye, call placed to [facility], NP, order to be placed.

## 2019-07-16 NOTE — PROGRESS NOTES
Problem: Self Care Deficits Care Plan (Adult)  Goal: *Acute Goals and Plan of Care (Insert Text)  Description  Occupational Therapy Goals  Initiated 7/12/2019  1. Patient will perform bathing with modified independence within 7 day(s). 2.  Patient will perform lower body dressing with modified independence within 7 day(s). 3.  Patient will perform grooming with independence within 7 day(s). 4.  Patient will perform toilet transfers with modified independence within 7 day(s). 5.  Patient will perform all aspects of toileting with modified independence within 7 day(s). 6.  Patient will participate in upper extremity therapeutic exercise/activities with supervision/set-up for 10 minutes within 7 day(s). 7.  Patient will utilize energy conservation techniques during functional activities with verbal cues within 7 day(s). Outcome: Progressing Towards Goal   OCCUPATIONAL THERAPY TREATMENT  Patient: Tiffany Alves (89 y.o. female)  Date: 7/16/2019  Diagnosis: Elevated troponin [R74.8] Acute ischemic stroke [facility])       Precautions:    Chart, occupational therapy assessment, plan of care, and goals were reviewed. ASSESSMENT:   The patient was received in bed. Reports she is resting at this time as she is being discharged later today until planned surgery in 1 week per chart. Recommended patient use her RW in the house. States she has a walker basket on it to transport items. States she has pull cords in her apartment and a sign on her door as alerts in case she has a fall. Reports she stands in the shower. Recommended a shower chair. Patient declines option to have therapist order one at this time. Recommended patient not sit for long periods of time without getting up and down to maintain leg strength. Patient and this therapist spoke with case management and geriatric nurse specialist re plan for home in regards to getting food and a shower chair.  Of note, this therapist called patient's pharmacy and local medical suppliers, none of which will deliver a shower chair to patient's home. Patient notified of this. Patient reluctant, she reports, to ask for help. After meeting with staff listed, patient in agreement to request a close friend to pick her up, stop for groceries and a shower chair on the way home. The following are barriers to ADL independence while in acute care:   - Cognitive and/or behavioral: none   - Medical condition: strength, functional endurance, standing balance, medical history and coordination    - Other: bilateral LE weakness      Prior level of function:  independent and living alone. Has a RW, but does not use in the apartment per report           PLAN:  Patient continues to benefit from skilled intervention to address the above impairments. Continue treatment per established plan of care. Recommend with staff: Micaela Lewis in chair for all meals, toileting in bathroom with RW and assist of 1, self care with min assist.  Recommend next OT session: LE self care  Discharge recommendations: Home health (to increase independence and safety), physical assist for mobility, 24/7 assist  If above is not an option then recommend: Home health (to increase independence and safety)    Barriers to discharging home, in addition to above listed impairments: level of physical assist required to maintain patient safety. Equipment recommendations for successful discharge (if) home: shower chair      SUBJECTIVE:   Patient stated I'm going home today.     OBJECTIVE DATA SUMMARY:   Cognitive/Behavioral Status:  Neurologic State: Alert  Orientation Level: Oriented X4  Cognition: Appropriate for age attention/concentration; Follows commands  Perception: Appears intact  Perseveration: No perseveration noted  Safety/Judgement: Awareness of environment    Functional Mobility and Transfers for ADLs:      Transfers:   Recommended use of a shower seat to shower at home.  Offered to order one during her stay so she can take it home with her. Patient declined and stated she would look into it. ADL Intervention:             Cognitive Retraining  Safety/Judgement: Awareness of environment      Activity Tolerance:   Fair  Please refer to the flowsheet for vital signs taken during this treatment.     After treatment patient left:   Supine in bed  Call light within reach  RN notified     COMMUNICATION/COLLABORATION:   The patients plan of care was discussed with: Registered Nurse    IVETTE Rand  Time Calculation: 9 mins

## 2019-07-16 NOTE — PROGRESS NOTES
Problem: Mobility Impaired (Adult and Pediatric)  Goal: *Acute Goals and Plan of Care (Insert Text)  Description  Physical Therapy Goals   7/12/2019  1. Patient will move from supine to sit and sit to supine , scoot up and down and roll side to side in bed with supervision/set-up within 7 day(s). 2.  Patient will transfer from bed to chair and chair to bed with supervision/set-up using the least restrictive device within 7 day(s). 3.  Patient will perform sit to stand with supervision/set-up within 7 day(s). 4.  Patient will ambulate with supervision/set-up for 100 feet with the least restrictive device within 7 day(s). Outcome: Progressing Towards Goal    PHYSICAL THERAPY TREATMENT  Patient: Lakisha Pacheco (53 y.o. female)  Date: 7/16/2019  Diagnosis: Elevated troponin [R74.8] Acute ischemic stroke [facility])       Precautions:    Chart, physical therapy assessment, plan of care and goals were reviewed. ASSESSMENT:  Based on the objective data described below, the patient presents with Supervision and Contact guard assistance level overall for transfers. Gait training completed at Contact guard assistance, 80 feet and using a gait belt and rolling walker. Patient remains unsteady but no LOB noted during straight paths and turns. Reviewed safety precautions and measures to take when returning home and use of assistive device as she will be home alone for periods of time before returning for surgery next week. She is awaiting to hear back from friend who will be able to give her rides and check in on her. The following are barriers to independence while in acute care:   -Cognitive and/or behavioral: none   -Medical condition: ROM, strength and WB status    -Other:       Prior level of function: Mod I with a walker      PLAN:  Patient continues to benefit from skilled intervention to address the above impairments. Continue treatment per established plan of care.   Recommend with staff: CGA with walker  Recommend next PT session: gait and balance  Discharge recommendations: Home health (to increase independence and safety)  If above is not an option then recommend: Rehab at skilled nursing facility (SNF) (to regain functional baseline patient requires rehab)    Patient's barriers to discharging home, in addition to above impairments: lives alone  family availability to assist  history of falls. Equipment recommendations for successful discharge (if) home: shower chair        SUBJECTIVE:   Patient stated ? I'm trying to get my ride set up to go home. ?    OBJECTIVE DATA SUMMARY:   Critical Behavior:  Neurologic State: Alert  Orientation Level: Oriented X4  Cognition: Appropriate for age attention/concentration, Follows commands  Safety/Judgement: Awareness of environment  Functional Mobility Training:  Bed Mobility:     Supine to Sit: Modified independent              Transfers:  Sit to Stand: Contact guard assistance  Stand to Sit: Supervision                             Balance:  Sitting: Intact  Standing: Impaired  Standing - Static: Good;Constant support  Standing - Dynamic : Fair  Ambulation/Gait Training:  Distance (ft): 80 Feet (ft)  Assistive Device: Gait belt;Walker, rolling  Ambulation - Level of Assistance: Contact guard assistance        Gait Abnormalities: Foot drop;Decreased step clearance; Path deviations        Base of Support: Narrowed     Speed/Samira: Pace decreased (<100 feet/min)  Step Length: Right shortened;Left shortened               Pain:  Pre treatment: 0 /10   During treatment: 0/10  Post treatment:  0/10       Activity Tolerance:   Good  Please refer to the flowsheet for vital signs taken during this treatment.     After treatment patient left:   Supine in bed  Call light within reach  RN notified     COMMUNICATION/COLLABORATION:   The patient?s plan of care was discussed with: Registered Nurse    Curly Parks, PT   Time Calculation: 10 mins

## 2019-07-16 NOTE — PROGRESS NOTES
Transition of Care Plan    1. Home with home health/ [address] referral sent and accepted. She can be seen on Thursday 7/18/19     2. Patient will return to [facility] for surgery next week    3. Patient does not have a ride home  Would not give CM permission to call nieces--Ewa Mary Jomack Berg [phone] or Mirna Mills [phone] regarding discharge plan. Patient said she was calling her friend Josue Downing Shonna Player) to ask if she an transport home and stop at the grocery store for some groceries and to purchase a shower chair [facility])  She would not provide CM with the friend's telephone number. If friend cannot transport home but will secure groceries for patient, CM will provide taxi home. 4.  CM left message for Lawrence Rojo in the home delivered meals dept at [place] ([phone]) asking her to call CM regarding meals delivery (stat) to patient. 5. CM gave patient the number to [facility] [employer] [facility] [phone] to call to inquire about home delivered meals . She called and meals will be delivered first of next week. Cm talked with patient about talking with her nieces about securing food at home and checking on her until her surgery. Patient refused and said it is a \"family matter\"  She hopes her friend, Josue Downing, will be able to come to the hospital to transport her to [place] and then home. Patient is weak but feels she will be ok with her walker at home and having PT/OT home health coming to check on her. After surgery next week, patient plans to go to rehab-- [facility] and will need authorization. CM to follow and assist with needs-- mainly transport home and securing groceries.

## 2019-07-16 NOTE — PROGRESS NOTES
Hospitalist Progress Note  Jem Proctor MD  Answering service: [phone] OR [phone] from in house phone        Date of Service:  2019  NAME:  Leonel Norman  :  1940  MRN:  [id number]      Admission Summary:   66y old female patient with PMH of CAD s/ CABG x3, DM, HTN, HLD, CKD, anxiety and chronic left shoulder s/p dislocation presented to Ed for evaluation of bilateral leg weakness. difficulty ambulation,  weak,the left more than the right. Interval history / Subjective:   LE weakness ongoing, attempting to send home with HHPT/OT      Assessment & Plan:     Acute CVA Lt basal ganglia, noted on MRI  - LE weakness  -initial CTAshow 80% stenosis,prox LT ICA,50% stenosis, surgery planned for next week with vascular.   -on ASA, plavix added, statin   -echo normal  -PT/OT. recc IP rehab     Critical LT ICA 80%  -vascular Sx consult  -will need time to recover plan for surgery next week   -c/w asa ,plavix      Neck pain/weakness - cervical spine stenosis   - PT/OT     HTN- uncontrolled  - C/w coreg, hydralazine  -add IV hydralazine, with parameters     Hx CAD s/p CABG  - home meds coreg, statin     Elevated Troponin,non specific in setting of acute stroke.  -minimally elevated ,with trend downand is flat.   - Echo w/o preserved EF and no wall motion abnormality     GERD - protonix     CKD lll - cr 1.2 , near baseline, will monitor      Left shoulder pain- chronic     Anxiety - c/w home meds  Abilify, Cymbalta, hydroxyzine      weight loss,moderate chronic protein calorie maturation  -nutrition consult    Code status: FULL  DVT prophylaxis: heparin     Care Plan discussed with: Patient/Family  Disposition: TBD     Hospital Problems  Date Reviewed: 2019          Codes Class Noted POA    * (Principal) Acute ischemic stroke ([facility] [address] ICD-10-CM: I63.9  ICD-9-CM: 434.91  2019 Unknown        Elevated troponin ICD-10-CM: R74.8  ICD-9-CM: 790.6  6/3/2014 Unknown                Review of Systems:   A comprehensive review of systems was negative except for that written in the HPI. Vital Signs:    Last 24hrs VS reviewed since prior progress note. Most recent are:  Visit Vitals  /65 (BP 1 Location: Left arm, BP Patient Position: At rest)   Pulse 65   Temp 98.6 °F (37 °C)   Resp 20   Ht 5' 5\" (1.651 m)   Wt 72.6 kg (160 lb 0.9 oz)   SpO2 96%   Breastfeeding? No   BMI 26.63 kg/m²         Intake/Output Summary (Last 24 hours) at 7/16/2019 1621  Last data filed at 7/16/2019 1317  Gross per 24 hour   Intake    Output 1701 ml   Net -1701 ml        Physical Examination:             Constitutional:  No acute distress, cooperative, pleasant    ENT:  Oral mucous moist, oropharynx benign. Resp:  CTA bilaterally. No wheezing/rhonchi/rales. No accessory muscle use   CV:  Regular rhythm, normal rate, no murmurs, gallops, rubs    GI:  Soft, non distended, non tender. normoactive bowel sounds, no hepatosplenomegaly     Musculoskeletal:  No edema, warm, 2+ pulses throughout     Neurologic:  Moves all extremities. LE weakness. Skin:  Good turgor, no rashes or ulcers       Data Review:   Imaging and laboratory data reviewed. Labs:   No results for input(s): WBC, HGB, HCT, PLT, HGBEXT, HCTEXT, PLTEXT in the last 72 hours. No results for input(s): NA, K, CL, CO2, BUN, CREA, GLU, CA, MG, PHOS, URICA in the last 72 hours. No results for input(s): SGOT, GPT, ALT, AP, TBIL, TBILI, TP, ALB, GLOB, GGT, AML, LPSE in the last 72 hours. No lab exists for component: AMYP, HLPSE  No results for input(s): INR, PTP, APTT in the last 72 hours. No lab exists for component: INREXT   No results for input(s): FE, TIBC, PSAT, FERR in the last 72 hours. No results found for: FOL, RBCF   No results for input(s): PH, PCO2, PO2 in the last 72 hours. No results for input(s): CPK, CKNDX, TROIQ in the last 72 hours.     No lab exists for component: CPKMB  Lab Results   Component Value Date/Time    Cholesterol, total 156 07/13/2019 04:37 AM    HDL Cholesterol 50 07/13/2019 04:37 AM    LDL, calculated 91.6 07/13/2019 04:37 AM    Triglyceride 72 07/13/2019 04:37 AM    CHOL/HDL Ratio 3.1 07/13/2019 04:37 AM     Lab Results   Component Value Date/Time    Glucose (POC) 94 07/12/2019 05:40 PM    Glucose (POC) 135 (H) 02/17/2015 06:46 AM    Glucose (POC) 129 (H) 02/16/2015 09:58 PM    Glucose (POC) 135 (H) 02/16/2015 05:13 PM    Glucose (POC) 159 (H) 02/16/2015 12:25 PM     Lab Results   Component Value Date/Time    Color YELLOW/STRAW 07/11/2019 10:17 AM    Appearance CLOUDY (A) 07/11/2019 10:17 AM    Specific gravity 1.022 07/11/2019 10:17 AM    pH (UA) 6.0 07/11/2019 10:17 AM    Protein 300 (A) 07/11/2019 10:17 AM    Glucose NEGATIVE  07/11/2019 10:17 AM    Ketone NEGATIVE  07/11/2019 10:17 AM    Bilirubin NEGATIVE  07/11/2019 10:17 AM    Urobilinogen 0.2 07/11/2019 10:17 AM    Nitrites NEGATIVE  07/11/2019 10:17 AM    Leukocyte Esterase SMALL (A) 07/11/2019 10:17 AM    Epithelial cells FEW 07/11/2019 10:17 AM    Bacteria NEGATIVE  07/11/2019 10:17 AM    WBC 0-4 07/11/2019 10:17 AM    RBC 5-10 07/11/2019 10:17 AM         Medications Reviewed:     Current Facility-Administered Medications   Medication Dose Route Frequency    hydrALAZINE (APRESOLINE) tablet 50 mg  50 mg Oral TID    polyvinyl alcohol (LIQUIFILM TEARS) 1.4 % ophthalmic solution 1 Drop  1 Drop Both Eyes PRN    clopidogrel (PLAVIX) tablet 75 mg  75 mg Oral DAILY    hydrALAZINE (APRESOLINE) 20 mg/mL injection 10 mg  10 mg IntraVENous Q6H PRN    pravastatin (PRAVACHOL) tablet 80 mg  80 mg Oral QHS    sodium chloride (NS) flush 5-40 mL  5-40 mL IntraVENous Q8H    sodium chloride (NS) flush 5-40 mL  5-40 mL IntraVENous PRN    acetaminophen (TYLENOL) tablet 650 mg  650 mg Oral Q4H PRN    Or    acetaminophen (TYLENOL) solution 650 mg  650 mg Per NG tube Q4H PRN    Or    acetaminophen (TYLENOL) suppository 650 mg  650 mg Rectal Q4H PRN    aspirin chewable tablet 81 mg  81 mg Oral DAILY    labetalol (NORMODYNE;TRANDATE) injection 5 mg  5 mg IntraVENous Q10MIN PRN    sodium chloride (NS) flush 5-40 mL  5-40 mL IntraVENous Q8H    sodium chloride (NS) flush 5-40 mL  5-40 mL IntraVENous PRN    acetaminophen (TYLENOL) tablet 650 mg  650 mg Oral Q4H PRN    heparin (porcine) injection 5,000 Units  5,000 Units SubCUTAneous Q8H    ARIPiprazole (ABILIFY) tablet 10 mg  10 mg Oral DAILY    carvedilol (COREG) tablet 12.5 mg  12.5 mg Oral BID WITH MEALS    diazePAM (VALIUM) tablet 6 mg  6 mg Oral Q6H PRN    DULoxetine (CYMBALTA) capsule 120 mg  120 mg Oral BID    hydrOXYzine HCl (ATARAX) tablet 50 mg  50 mg Oral Q6H    pantoprazole (PROTONIX) tablet 40 mg  40 mg Oral ACB    primidone (MYSOLINE) tablet 100 mg  100 mg Oral BID    senna-docusate (PERICOLACE) 8.6-50 mg per tablet 1 Tab  1 Tab Oral DAILY PRN    zolpidem (AMBIEN) tablet 10 mg  10 mg Oral QHS PRN     ______________________________________________________________________  EXPECTED LENGTH OF STAY: 3d 12h  ACTUAL LENGTH OF STAY:          5                 Talha Luis MD

## 2019-07-16 NOTE — PROGRESS NOTES
Vascular  VSS  Neurosurgery has cleared the patient. She will need left CEA. Would prefer to do surgery next week to allow her acute infarct to stabilize. In the interim it would be worthwhile to have  evaluate her for rehab or a SNF. She is concerned that she is unable to walk and she lives by herself. My office will contact her for surgical arrangements next week.

## 2019-07-17 PROBLEM — I10 HTN (HYPERTENSION): Status: ACTIVE | Noted: 2019-07-17

## 2019-07-17 PROCEDURE — 74011000250 HC RX REV CODE- 250: Performed by: HOSPITALIST

## 2019-07-17 PROCEDURE — 74011250637 HC RX REV CODE- 250/637: Performed by: NURSE PRACTITIONER

## 2019-07-17 PROCEDURE — 74011250637 HC RX REV CODE- 250/637: Performed by: FAMILY MEDICINE

## 2019-07-17 PROCEDURE — 65660000000 HC RM CCU STEPDOWN

## 2019-07-17 PROCEDURE — 97535 SELF CARE MNGMENT TRAINING: CPT

## 2019-07-17 PROCEDURE — 74011250636 HC RX REV CODE- 250/636: Performed by: FAMILY MEDICINE

## 2019-07-17 PROCEDURE — 97116 GAIT TRAINING THERAPY: CPT

## 2019-07-17 PROCEDURE — 74011250637 HC RX REV CODE- 250/637: Performed by: HOSPITALIST

## 2019-07-17 PROCEDURE — 74011250637 HC RX REV CODE- 250/637: Performed by: INTERNAL MEDICINE

## 2019-07-17 PROCEDURE — 74011250636 HC RX REV CODE- 250/636: Performed by: INTERNAL MEDICINE

## 2019-07-17 RX ORDER — ZOLPIDEM TARTRATE 10 MG/1
5 TABLET ORAL
Qty: 30 TAB | Refills: 0 | Status: SHIPPED
Start: 2019-07-17 | End: 2019-08-16

## 2019-07-17 RX ORDER — HYDRALAZINE HYDROCHLORIDE 100 MG/1
100 TABLET, FILM COATED ORAL 3 TIMES DAILY
Qty: 90 TAB | Refills: 0 | Status: SHIPPED
Start: 2019-07-17 | End: 2019-08-16

## 2019-07-17 RX ORDER — CLOPIDOGREL BISULFATE 75 MG/1
75 TABLET ORAL DAILY
Qty: 30 TAB | Refills: 0 | Status: SHIPPED
Start: 2019-07-18 | End: 2019-07-31

## 2019-07-17 RX ORDER — GUAIFENESIN 100 MG/5ML
81 LIQUID (ML) ORAL DAILY
Qty: 30 TAB | Refills: 0 | Status: SHIPPED
Start: 2019-07-18 | End: 2019-08-17

## 2019-07-17 RX ORDER — CLONIDINE HYDROCHLORIDE 0.1 MG/1
TABLET ORAL
Status: DISPENSED
Start: 2019-07-17 | End: 2019-07-17

## 2019-07-17 RX ORDER — ATORVASTATIN CALCIUM 40 MG/1
80 TABLET, FILM COATED ORAL DAILY
Qty: 60 TAB | Refills: 0 | Status: SHIPPED | OUTPATIENT
Start: 2019-07-17 | End: 2019-08-16

## 2019-07-17 RX ORDER — HYDRALAZINE HYDROCHLORIDE 50 MG/1
100 TABLET, FILM COATED ORAL 3 TIMES DAILY
Status: DISCONTINUED | OUTPATIENT
Start: 2019-07-17 | End: 2019-07-18 | Stop reason: HOSPADM

## 2019-07-17 RX ORDER — CLONIDINE HYDROCHLORIDE 0.1 MG/1
0.1 TABLET ORAL
Status: COMPLETED | OUTPATIENT
Start: 2019-07-17 | End: 2019-07-17

## 2019-07-17 RX ADMIN — HYDRALAZINE HYDROCHLORIDE 100 MG: 50 TABLET, FILM COATED ORAL at 09:15

## 2019-07-17 RX ADMIN — HYDRALAZINE HYDROCHLORIDE 100 MG: 50 TABLET, FILM COATED ORAL at 21:22

## 2019-07-17 RX ADMIN — HYDRALAZINE HYDROCHLORIDE 10 MG: 20 INJECTION INTRAMUSCULAR; INTRAVENOUS at 06:35

## 2019-07-17 RX ADMIN — LABETALOL 20 MG/4 ML (5 MG/ML) INTRAVENOUS SYRINGE 5 MG: at 03:15

## 2019-07-17 RX ADMIN — PRIMIDONE 100 MG: 50 TABLET ORAL at 18:13

## 2019-07-17 RX ADMIN — HYDROXYZINE HYDROCHLORIDE 50 MG: 25 TABLET, FILM COATED ORAL at 16:13

## 2019-07-17 RX ADMIN — ARIPIPRAZOLE 10 MG: 10 TABLET ORAL at 09:27

## 2019-07-17 RX ADMIN — DULOXETINE HYDROCHLORIDE 120 MG: 60 CAPSULE, DELAYED RELEASE ORAL at 09:16

## 2019-07-17 RX ADMIN — ASPIRIN 81 MG 81 MG: 81 TABLET ORAL at 09:15

## 2019-07-17 RX ADMIN — DULOXETINE HYDROCHLORIDE 120 MG: 60 CAPSULE, DELAYED RELEASE ORAL at 18:13

## 2019-07-17 RX ADMIN — Medication 10 ML: at 21:24

## 2019-07-17 RX ADMIN — Medication 10 ML: at 14:00

## 2019-07-17 RX ADMIN — HEPARIN SODIUM 5000 UNITS: 5000 INJECTION INTRAVENOUS; SUBCUTANEOUS at 06:36

## 2019-07-17 RX ADMIN — HYDROXYZINE HYDROCHLORIDE 50 MG: 25 TABLET, FILM COATED ORAL at 11:45

## 2019-07-17 RX ADMIN — HEPARIN SODIUM 5000 UNITS: 5000 INJECTION INTRAVENOUS; SUBCUTANEOUS at 21:24

## 2019-07-17 RX ADMIN — PRIMIDONE 100 MG: 50 TABLET ORAL at 09:16

## 2019-07-17 RX ADMIN — HEPARIN SODIUM 5000 UNITS: 5000 INJECTION INTRAVENOUS; SUBCUTANEOUS at 16:20

## 2019-07-17 RX ADMIN — HYDROXYZINE HYDROCHLORIDE 50 MG: 25 TABLET, FILM COATED ORAL at 21:23

## 2019-07-17 RX ADMIN — Medication 10 ML: at 06:37

## 2019-07-17 RX ADMIN — PANTOPRAZOLE SODIUM 40 MG: 40 TABLET, DELAYED RELEASE ORAL at 06:37

## 2019-07-17 RX ADMIN — PRAVASTATIN SODIUM 80 MG: 40 TABLET ORAL at 21:22

## 2019-07-17 RX ADMIN — Medication 10 ML: at 16:20

## 2019-07-17 RX ADMIN — HYDROXYZINE HYDROCHLORIDE 50 MG: 25 TABLET, FILM COATED ORAL at 04:42

## 2019-07-17 RX ADMIN — HYDRALAZINE HYDROCHLORIDE 100 MG: 50 TABLET, FILM COATED ORAL at 16:13

## 2019-07-17 RX ADMIN — CARVEDILOL 12.5 MG: 12.5 TABLET, FILM COATED ORAL at 16:13

## 2019-07-17 RX ADMIN — CLOPIDOGREL BISULFATE 75 MG: 75 TABLET ORAL at 09:16

## 2019-07-17 RX ADMIN — CARVEDILOL 12.5 MG: 12.5 TABLET, FILM COATED ORAL at 09:16

## 2019-07-17 RX ADMIN — CLONIDINE HYDROCHLORIDE 0.1 MG: 0.1 TABLET ORAL at 04:42

## 2019-07-17 NOTE — PROGRESS NOTES
Vascular  Needs strengthening and ambulation in SNF  Plan left CEA next Friday  My office will coordinate with the patient as an outpatient

## 2019-07-17 NOTE — PROGRESS NOTES
Pt BP was elevated. Labetalol was given but the BP still remained high. NP was paged. Clonidine order was placed.

## 2019-07-17 NOTE — DISCHARGE SUMMARY
Discharge Summary       PATIENT ID: Trang Moreno  MRN: [id number]   YOB: 1940    DATE OF ADMISSION: 7/11/2019  9:09 AM    DATE OF DISCHARGE: 07/17/2019   PRIMARY CARE PROVIDER: El Romberg, DO     DISCHARGING PROVIDER: Emma Hernandez MD    To contact this individual call [phone] and ask the  to page. If unavailable ask to be transferred the Adult Hospitalist Department. CONSULTATIONS: ED CONSULT TO SENIOR SERVICES PHYSICAL THERAPY  ED CONSULT TO SENIOR SERVICES CASE MANAGEMENT  ED CONSULT TO Edgar Hassan  ED CONSULT TO SENIOR SERVICES PHARMACIST  ED CONSULT TO SENIOR SERVICES PHYSICAL THERAPY  IP CONSULT TO NEUROLOGY  IP CONSULT TO HOSPITALIST  IP CONSULT TO CARDIOLOGY  IP CONSULT TO VASCULAR SURGERY  IP CONSULT TO NEUROSURGERY    PROCEDURES/SURGERIES: * No surgery found *    ADMITTING DIAGNOSES & HOSPITAL COURSE:   Acute L basal ganglia CVA  Critical L ICA stenosis - 80%, surgery planned for next week. HTN    66y old female patient with PMH of CAD s/ CABG x3, DM, HTN, HLD, CKD, anxiety and chronic left shoulder s/p dislocation presented to Ed for evaluation of bilateral leg weakness.  difficulty ambulation,  weak,the left more than the right. MRI showed an acute stroke, plavix was added to her aspirin. Echo done and normal. LT ICA was critically stenosis, and vascular surgery planning on intervention next week. PT/OT recommends inpatient rehab or SNF, Pt lives alone, and is unable to care for herself. Plan to discharge to SNF with readmission for surgery next week. DISCHARGE DIAGNOSES / PLAN:      Acute CVA Lt basal ganglia, noted on MRI 7/12 - LE weakness  -initial CTAshow 80% stenosis,prox LT ICA,50% stenosis, surgery planned for next week with vascular.   -on ASA, plavix added, statin   -echo normal  -PT/OT. recc IP rehab     Critical LT ICA 80%  -vascular Sx consult  -will need time to recover plan for surgery next week, vascular surgery Dr. Ana Rosa Montgomery to arrange via office for next week. -c/w asa ,plavix      Neck pain/weakness - cervical spine stenosis   - PT/OT     HTN- uncontrolled, BP better after increased hydralazine dose. - C/w coreg, increase hydralazine oral today      Hx CAD s/p CABG  - home meds coreg, statin     Elevated Troponin,non specific in setting of acute stroke.  -minimally elevated ,with trend downand is flat. - Echo w/o preserved EF and no wall motion abnormality     GERD - protonix     CKD lll - cr 1.2 , near baseline, will monitor      Left shoulder pain- chronic     Anxiety - c/w home meds  Abilify, Cymbalta, hydroxyzine      weight loss,moderate chronic protein calorie maturation  -nutrition consult     ADDITIONAL CARE RECOMMENDATIONS:   1. Please take all medications as prescribed. Note changes as below. 2. Please make sure to follow up with your primary care physician within 1-2 weeks of discharge for hospital follow up. You will need to come back for vascular surgery next week. Dr. Patty Ewnig office should contact you regarding scheduling this. 3 .Avoid tobacco, alcohol and other illicit drug use. PENDING TEST RESULTS:   At the time of discharge the following test results are still pending: None    FOLLOW UP APPOINTMENTS:    Follow-up Information     Follow up With Specialties Details Why Contact [employer]  [facility]  you will be seen Thrusday 7/18/19  [address] Adrián Warner [address]    Peri El,  Family Practice In 1 week  [address] [facility] [address] [facility] MD Vascular Surgery In 1 week The office should contact you to schedule vascular carotid surgery next week. If you do not hear from them, please give them a call.   [address] [facility]  [phone]               DIET: Cardiac Diet    ACTIVITY: PT/OT Eval and treat    WOUND CARE: None    EQUIPMENT needed: Per PT/OT      DISCHARGE MEDICATIONS:  Current Discharge Medication List      START taking these medications    Details   atorvastatin (LIPITOR) 40 mg tablet Take 2 Tabs by mouth daily for 30 days. Qty: 60 Tab, Refills: 0      aspirin 81 mg chewable tablet Take 1 Tab by mouth daily for 30 days. Qty: 30 Tab, Refills: 0      clopidogrel (PLAVIX) 75 mg tab Take 1 Tab by mouth daily for 30 days. Qty: 30 Tab, Refills: 0         CONTINUE these medications which have CHANGED    Details   hydrALAZINE (APRESOLINE) 100 mg tablet Take 1 Tab by mouth three (3) times daily for 30 days. Qty: 90 Tab, Refills: 0      zolpidem (AMBIEN) 10 mg tablet Take 0.5 Tabs by mouth nightly as needed for Sleep for up to 30 days. Max Daily Amount: 5 mg. Qty: 30 Tab, Refills: 0    Associated Diagnoses: Primary insomnia         CONTINUE these medications which have NOT CHANGED    Details   cyanocobalamin (VITAMIN B12) 100 mcg tablet Take 100 mcg by mouth daily. primidone (MYSOLINE) 50 mg tablet Take 2 Tabs by mouth two (2) times a day. Qty: 120 Tab, Refills: 5      hydrOXYzine HCl (ATARAX) 50 mg tablet Take 50 mg by mouth every six (6) hours. ARIPiprazole (ABILIFY) 10 mg tablet Take 10 mg by mouth daily. Indications: Additional Medications to Treat Depression      methocarbamol (ROBAXIN) 750 mg tablet Take 750 mg by mouth three (3) times daily. Indications: muscle spasm      carvedilol (COREG) 12.5 mg tablet Take 1 Tab by mouth two (2) times daily (with meals). Qty: 180 Tab, Refills: 3      Cholecalciferol, Vitamin D3, 1,000 unit cap Take 1,000 Units by mouth daily. LACTOBACILLUS RHAMNOSUS GG (CULTURELLE PO) Take 1 Tab by mouth daily. Associated Diagnoses: Paroxysmal atrial fibrillation ([address]  Coronary artery disease involving native coronary artery without angina pectoris; Bradycardia; Edema; Type 2 diabetes mellitus without complication (HCC)      pantoprazole (PROTONIX) 40 mg tablet Take 40 mg by mouth Daily (before breakfast). Indications: h/o bleeding ulcer with nsaid      DULoxetine (CYMBALTA) 60 mg capsule Take 120 mg by mouth two (2) times a day. Indications: Anxiousness associated with Depression      multivitamins-minerals-lutein (CENTRUM SILVER) tab tablet Take 1 Tab by mouth daily. diazePAM (VALIUM) 2 mg tablet Take  by mouth every six (6) hours as needed for Anxiety. Indications: anxious      vit C,S-Lz-kyzxp-lutein-zeaxan (PRESERVISION AREDS-2) 690-031-53-1 mg-unit-mg-mg cap capsule Take 1 Cap by mouth two (2) times a day. senna-docusate (SENNA WITH DOCUSATE SODIUM) 8.6-50 mg per tablet Take 1 Tab by mouth daily as needed for Constipation. STOP taking these medications       pravastatin (PRAVACHOL) 40 mg tablet Comments:   Reason for Stopping:                 NOTIFY YOUR PHYSICIAN FOR ANY OF THE FOLLOWING:   Fever over 101 degrees for 24 hours. Chest pain, shortness of breath, fever, chills, nausea, vomiting, diarrhea, change in mentation, falling, weakness, bleeding. Severe pain or pain not relieved by medications. Or, any other signs or symptoms that you may have questions about. DISPOSITION:    Home With:   OT  PT  HH  RN      X Long term SNF/Inpatient Rehab    Independent/assisted living    Hospice    Other:       PATIENT CONDITION AT DISCHARGE:     Functional status    Poor    X Deconditioned     Independent      Cognition    X Lucid     Forgetful     Dementia      Catheters/lines (plus indication)    Sue     PICC     PEG    X None      Code status   X  Full code     DNR      PHYSICAL EXAMINATION AT DISCHARGE:  Visit Vitals  /68 (BP 1 Location: Right arm, BP Patient Position: At rest)   Pulse 64   Temp 98.4 °F (36.9 °C)   Resp 16   Ht 5' 5\" (1.651 m)   Wt 71.9 kg (158 lb 8.2 oz)   SpO2 97%   Breastfeeding?  No   BMI 26.38 kg/m²     Gen: NAD, awake in bed  HEENT: NC/AT, sclera anicteric, PERRL, EOMI  CV: RRR no m/r/g  Resp: CTA b/l no increased work of breathing  Abd: NT/ND, normal bowel sounds  Ext: 2+ pulses, no edema, bilateral LE weakness. Skin: No rashes        CHRONIC MEDICAL DIAGNOSES:  Problem List as of 7/17/2019 Date Reviewed: 2/12/2019          Codes Class Noted - Resolved    * (Principal) Acute ischemic stroke [facility]) ICD-10-CM: I63.9  ICD-9-CM: 434.91  7/12/2019 - Present        Fall ICD-10-CM: W19. Deandre Douse  ICD-9-CM: E888.9  12/24/2018 - Present        CKD (chronic kidney disease), stage III ([facility] ICD-10-CM: N18.3  ICD-9-CM: 585.3  7/8/2015 - Present        Edema ICD-10-CM: R60.9  ICD-9-CM: 782.3  5/6/2015 - Present        Diabetes mellitus ([facility] ICD-10-CM: E11.9  ICD-9-CM: 250.00  5/6/2015 - Present        Postoperative anemia due to acute blood loss ICD-10-CM: D62  ICD-9-CM: 285.1  2/14/2015 - Present        S/P CABG (coronary artery bypass graft) ICD-10-CM: Z95.1  ICD-9-CM: V45.81  2/13/2015 - Present        Syncope ICD-10-CM: R55  ICD-9-CM: 780.2  2/9/2015 - Present        Bradycardia ICD-10-CM: R00.1  ICD-9-CM: 427.89  2/9/2015 - Present        MORENO (acute kidney injury) ([facility] ICD-10-CM: N17.9  ICD-9-CM: 584.9  2/9/2015 - Present        Anemia ICD-10-CM: D64.9  ICD-9-CM: 285.9  9/9/2014 - Present        GI bleed ICD-10-CM: K92.2  ICD-9-CM: 578.9  9/9/2014 - Present        AF (atrial fibrillation) (HCC) ICD-10-CM: I48.91  ICD-9-CM: 427.31  6/20/2014 - Present        Hypotension ICD-10-CM: I95.9  ICD-9-CM: 458.9  6/3/2014 - Present        CAD (coronary artery disease) ICD-10-CM: I25.10  ICD-9-CM: 414.00  6/3/2014 - Present    Overview Signed 2/10/2015 11:06 AM by Cecilia Prabhakar     2007 PCI x2 to LAD with STEPHAN             S/P coronary artery stent placement ICD-10-CM: Z95.5  ICD-9-CM: V45.82  6/3/2014 - Present        Renal failure ICD-10-CM: N19  ICD-9-CM: 149  6/3/2014 - Present        Elevated troponin ICD-10-CM: R74.8  ICD-9-CM: 790.6  6/3/2014 - Present        Pericardial effusion ICD-10-CM: I31.3  ICD-9-CM: 423.9  6/3/2014 - Present Lactic acidosis ICD-10-CM: E87.2  ICD-9-CM: 276.2  6/3/2014 - Present              Greater than 30 minutes were spent with the patient on counseling and coordination of care    Signed:   Rowena Lou MD  7/17/2019  3:20 PM

## 2019-07-17 NOTE — PROGRESS NOTES
12:30pm  ANDREA:  1. Pt is medically ready for discharge, recommendation is SNF. 2.  Chela Fabian phone [phone] to check status, still pending, nothing further needed at this time to expedite determination. 3.  [place] [facility] has accepted referral.    LADI Broderick      3:15pm    Chela Fabian again to check status authorization [phone]. It is still pending but again verified all clinicals received for determination. Also clarified with [address], Admissions at [facility] that pt will be a PLANNED READMISSION for vascular intervention next Friday July 26th.     LADI Broderick

## 2019-07-17 NOTE — PROGRESS NOTES
Problem: Falls - Risk of  Goal: *Absence of Falls  Description  Document Zuleika Wills Fall Risk and appropriate interventions in the flowsheet.   Outcome: Progressing Towards Goal  Note:   Fall Risk Interventions:  Mobility Interventions: Communicate number of staff needed for ambulation/transfer, Patient to call before getting OOB, PT Consult for mobility concerns, PT Consult for assist device competence         Medication Interventions: Evaluate medications/consider consulting pharmacy, Patient to call before getting OOB, Teach patient to arise slowly    Elimination Interventions: Call light in reach, Patient to call for help with toileting needs, Toileting schedule/hourly rounds    History of Falls Interventions: Door open when patient unattended         Problem: Patient Education: Go to Patient Education Activity  Goal: Patient/Family Education  Outcome: Progressing Towards Goal

## 2019-07-17 NOTE — PROGRESS NOTES
ANDREA:  1. Call report to [facility] phone [phone].  2.  [facility] scheduled for 9am on Thursday 7/18. Received authorization for [facility] starting today. Pt is approved, [id number] reference number. Start date is 7/18/19. CM called [place] admissions to determine if pt could be admitted today, however had to leave message.     Requested 9am transport time for pt to go to [place] tomorrow, will update pt, nurse and MD.    LADI Landry

## 2019-07-17 NOTE — PROGRESS NOTES
Problem: Self Care Deficits Care Plan (Adult)  Goal: *Acute Goals and Plan of Care (Insert Text)  Description  Occupational Therapy Goals  Initiated 7/12/2019  1. Patient will perform bathing with modified independence within 7 day(s). 2.  Patient will perform lower body dressing with modified independence within 7 day(s). 3.  Patient will perform grooming with independence within 7 day(s). 4.  Patient will perform toilet transfers with modified independence within 7 day(s). 5.  Patient will perform all aspects of toileting with modified independence within 7 day(s). 6.  Patient will participate in upper extremity therapeutic exercise/activities with supervision/set-up for 10 minutes within 7 day(s). 7.  Patient will utilize energy conservation techniques during functional activities with verbal cues within 7 day(s). Outcome: Progressing Towards Goal  OCCUPATIONAL THERAPY TREATMENT  Patient: Jonny Espinal (16 y.o. female)  Date: 7/17/2019  Diagnosis: Elevated troponin [R74.8] Acute ischemic stroke [facility])       Precautions:    Chart, occupational therapy assessment, plan of care, and goals were reviewed. ASSESSMENT:  Pt was received supine in bed and agreeable to OT. Pt presents with impaired cognition (max A required for problem solving related to IADL tasks including obtaining clothing for possible rehab stay next level of care), impaired balance, impaired awareness of deficits. Pt tolerated toileting, grooming sink level, and ambulation bed-bathroom-chair bedside bed. Chair alarm put in place by OT to maximize safety. Pt required walker safety training. Pt states he has a walker at home but history of \"occasional\" use only. Recommend rehabilitation program next level of care to maximize independence with self care and related mobility as pt resides independently in community at baseline. Progression toward goals:  ?       Improving appropriately and progressing toward goals  ? Improving slowly and progressing toward goals  ? Not making progress toward goals and plan of care will be adjusted     PLAN:  Patient continues to benefit from skilled intervention to address the above impairments. Continue treatment per established plan of care. Discharge Recommendations:  Rehab  Further Equipment Recommendations for Discharge:  TBD at rehab      SUBJECTIVE:   Patient stated I have not really though about that.     OBJECTIVE DATA SUMMARY:   Cognitive/Behavioral Status:  Neurologic State: Alert  Orientation Level: Oriented X4  Cognition: Follows commands  Perception: Appears intact  Perseveration: No perseveration noted  Safety/Judgement: Awareness of environment    Functional Mobility and Transfers for ADLs:  Bed Mobility:  Supine to Sit: Supervision; Additional time    Transfers:  Sit to Stand: Contact guard assistance          Balance:  Sitting: Intact  Standing: Impaired  Standing - Static: Good  Standing - Dynamic : Fair    ADL Intervention:       Grooming  Grooming Assistance: Contact guard assistance  Washing Hands: Contact guard assistance(Standing sink level)                        Toileting  Bladder Hygiene: Contact guard assistance  Clothing Management: Contact guard assistance  Cues: Verbal cues provided;Visual cues provided    Cognitive Retraining  Orientation Retraining: Situation(Problem solving IADL needs to prep for rehab)  Problem Solving: Identifying the task; Identifying the problem;General alternative solution  Executive Functions: Executing cognitive plans;Regulating behavior  Safety/Judgement: Awareness of environment            Pain:  Pain Scale 1: Numeric (0 - 10)                 Activity Tolerance:   Fair, pt denied dizziness and dyspnea during session. After treatment:   ? Patient left in no apparent distress sitting up in chair  ? Patient left in no apparent distress in bed  ? Call bell left within reach  ? Nursing notified  ? Caregiver present  ?  Chair alarm activated    COMMUNICATION/COLLABORATION:   The patients plan of care was discussed with: Physical Therapist and Registered Nurse    Jose Liang  Time Calculation: 16 mins

## 2019-07-17 NOTE — PROGRESS NOTES
PHYSICAL THERAPY TREATMENT  Patient: Vivi Baker (19 y.o. female)  Date: 7/17/2019  Diagnosis: Elevated troponin [R74.8] Acute ischemic stroke [facility])       Precautions:  fall  Chart, physical therapy assessment, plan of care and goals were reviewed. ASSESSMENT: Patient in bathroom on arrival. Able to ambulate in hallway 80 feet using a walker with CGA and constant verbal cues to keep walker close demonstrating decreased foot clearance and mild path deviations. Upon return to the room, despite verbal cues, patient let go of walker last 10 feet and ambulated to the chair reaching for furniture for support which presents a very high risk for falls. Presently patient not safe to return home alone. Recommend rehab before returning home. Progression toward goals:  ?    Improving appropriately and progressing toward goals  ? Improving slowly and progressing toward goals  ? Not making progress toward goals and plan of care will be adjusted     PLAN:  Patient continues to benefit from skilled intervention to address the above impairments. Continue treatment per established plan of care. Discharge Recommendations:  Rehab  Further Equipment Recommendations for Discharge:  will continue to assess     SUBJECTIVE:   Patient stated I am going home today.     OBJECTIVE DATA SUMMARY:   Critical Behavior:  Neurologic State: Alert  Orientation Level: Oriented X4  Cognition: Appropriate decision making, Appropriate for age attention/concentration, Appropriate safety awareness, Follows commands  Safety/Judgement: Awareness of environment  Functional Mobility Training:  Bed Mobility:   Not assessed   Transfers:  Sit to Stand: Supervision  Stand to Sit: Supervision      Balance:  Sitting: Intact; With support  Standing: Impaired; With support  Standing - Static: Good  Standing - Dynamic : Fair  Ambulation/Gait Training:  Distance (ft): 80 Feet (ft)  Assistive Device: Gait belt  Ambulation - Level of Assistance: Contact guard assistance(with path deviations, constant verbal cues to keep walker close)   Gait Abnormalities: Path deviations   Speed/Samira: Slow  Step Length: Left shortened;Right shortened       Pain:  Pain Scale 1: Numeric (0 - 10)  Pain Intensity 1: 0      Activity Tolerance:     Please refer to the flowsheet for vital signs taken during this treatment. After treatment:   ?    Patient left in no apparent distress sitting up in chair  ? Patient left in no apparent distress in bed  ? Call bell left within reach  ? Nursing notified  ? Caregiver present  ?     Bed alarm activated    COMMUNICATION/COLLABORATION:   The patients plan of care was discussed with: Registered Nurse    June Orr, PT   Time Calculation: 16 mins

## 2019-07-17 NOTE — PROGRESS NOTES
Following for ANDREA:  1. Recommendation is for SNF, referral to [facility] SNF via CC link. 2.  The following information was submitted to [facility] for initial authorization:  Face Sheet/Demographics    (Include: Usual living situation)  History and Physical  Last 24 hours of MD and RN notes   (Include: Current Level of Functioning; cognitive status)  PT/OT/ST Evaluations and/or notes within the last 48 hours  MAR  MD orders  (Include: Attending or ordering MDs name and phone #; skilled nursing needs;    Wound care/etc)  Projected Date of Transfer to SNF  Requested SNF  SNF Point of Contact and Phone #  CM Point of Contact and Phone #  NPI # for SNF

## 2019-07-17 NOTE — PROGRESS NOTES
Bedside and Verbal shift change report given to Cristhian (oncoming nurse) by NII Cerrato RN (offgoing nurse). Report given with SBAR, Kardex, Intake/Output, MAR and Recent Results.

## 2019-07-18 VITALS
BODY MASS INDEX: 27.18 KG/M2 | SYSTOLIC BLOOD PRESSURE: 166 MMHG | OXYGEN SATURATION: 98 % | DIASTOLIC BLOOD PRESSURE: 70 MMHG | TEMPERATURE: 98.4 F | HEART RATE: 72 BPM | WEIGHT: 163.14 LBS | RESPIRATION RATE: 16 BRPM | HEIGHT: 65 IN

## 2019-07-18 PROCEDURE — 74011250637 HC RX REV CODE- 250/637: Performed by: FAMILY MEDICINE

## 2019-07-18 PROCEDURE — 74011250636 HC RX REV CODE- 250/636: Performed by: FAMILY MEDICINE

## 2019-07-18 PROCEDURE — 74011250636 HC RX REV CODE- 250/636: Performed by: INTERNAL MEDICINE

## 2019-07-18 PROCEDURE — 74011250637 HC RX REV CODE- 250/637: Performed by: INTERNAL MEDICINE

## 2019-07-18 PROCEDURE — 74011250637 HC RX REV CODE- 250/637: Performed by: HOSPITALIST

## 2019-07-18 RX ORDER — CARVEDILOL 12.5 MG/1
25 TABLET ORAL 2 TIMES DAILY WITH MEALS
Status: DISCONTINUED | OUTPATIENT
Start: 2019-07-18 | End: 2019-07-18 | Stop reason: HOSPADM

## 2019-07-18 RX ORDER — LORAZEPAM 0.5 MG/1
0.5 TABLET ORAL
Status: COMPLETED | OUTPATIENT
Start: 2019-07-18 | End: 2019-07-18

## 2019-07-18 RX ORDER — CARVEDILOL 12.5 MG/1
25 TABLET ORAL 2 TIMES DAILY WITH MEALS
Qty: 180 TAB | Refills: 3 | Status: ON HOLD
Start: 2019-07-18 | End: 2020-05-01 | Stop reason: DRUGHIGH

## 2019-07-18 RX ORDER — LORAZEPAM 0.5 MG/1
0.5 TABLET ORAL ONCE
Status: DISCONTINUED | OUTPATIENT
Start: 2019-07-18 | End: 2019-07-18

## 2019-07-18 RX ADMIN — ARIPIPRAZOLE 10 MG: 10 TABLET ORAL at 08:38

## 2019-07-18 RX ADMIN — ASPIRIN 81 MG 81 MG: 81 TABLET ORAL at 08:04

## 2019-07-18 RX ADMIN — HYDRALAZINE HYDROCHLORIDE 10 MG: 20 INJECTION INTRAMUSCULAR; INTRAVENOUS at 03:45

## 2019-07-18 RX ADMIN — HYDRALAZINE HYDROCHLORIDE 100 MG: 50 TABLET, FILM COATED ORAL at 08:05

## 2019-07-18 RX ADMIN — PRIMIDONE 100 MG: 50 TABLET ORAL at 08:05

## 2019-07-18 RX ADMIN — CLOPIDOGREL BISULFATE 75 MG: 75 TABLET ORAL at 08:04

## 2019-07-18 RX ADMIN — HYDROXYZINE HYDROCHLORIDE 50 MG: 25 TABLET, FILM COATED ORAL at 06:05

## 2019-07-18 RX ADMIN — HEPARIN SODIUM 5000 UNITS: 5000 INJECTION INTRAVENOUS; SUBCUTANEOUS at 06:05

## 2019-07-18 RX ADMIN — DULOXETINE HYDROCHLORIDE 120 MG: 60 CAPSULE, DELAYED RELEASE ORAL at 08:05

## 2019-07-18 RX ADMIN — LORAZEPAM 0.5 MG: 0.5 TABLET ORAL at 08:36

## 2019-07-18 RX ADMIN — PANTOPRAZOLE SODIUM 40 MG: 40 TABLET, DELAYED RELEASE ORAL at 06:05

## 2019-07-18 RX ADMIN — CARVEDILOL 12.5 MG: 12.5 TABLET, FILM COATED ORAL at 08:04

## 2019-07-18 RX ADMIN — Medication 10 ML: at 06:06

## 2019-07-18 NOTE — ROUTINE PROCESS
TRANSFER - OUT REPORT:    Verbal report given to Moira Alamo at [facility] (name) on Mónica Reyes  being transferred to (unit) for routine progression of care       Report consisted of patients Situation, Background, Assessment and   Recommendations(SBAR). Information from the following report(s) SBAR, Kardex, Intake/Output, MAR, Recent Results and Med Rec Status was reviewed with the receiving nurse. Lines:   Peripheral IV Left Antecubital (Active)   Site Assessment Drainage (comment) 7/17/2019  9:21 PM   Phlebitis Assessment 0 7/17/2019  9:21 PM   Infiltration Assessment 0 7/17/2019  9:21 PM   Dressing Status Old drainage 7/17/2019  9:21 PM   Dressing Type Transparent 7/17/2019  9:21 PM   Hub Color/Line Status Capped;Flushed 7/17/2019  9:21 PM   Action Taken Open ports on tubing capped 7/17/2019  9:21 PM   Alcohol Cap Used Yes 7/17/2019  9:21 PM        Opportunity for questions and clarification was provided.       Patient transported with: CATHLEEN

## 2019-07-18 NOTE — ROUTINE PROCESS
Bedside shift change report given to Antwon Narayan 8 (oncoming nurse) by Clinton Medrano (offgoing nurse). Report included the following information SBAR, Kardex, Intake/Output, MAR, Recent Results, Med Rec Status and Cardiac Rhythm NSR.

## 2019-07-18 NOTE — DISCHARGE SUMMARY
Discharge Summary       PATIENT ID: Mónica Reyes  MRN: [id number]   YOB: 1940    DATE OF ADMISSION: 7/11/2019  9:09 AM    DATE OF DISCHARGE: 07/18/2019   PRIMARY CARE PROVIDER: Angelica Ware DO     DISCHARGING PROVIDER: Gautam Ruiz MD    To contact this individual call [phone] and ask the  to page. If unavailable ask to be transferred the Adult Hospitalist Department. CONSULTATIONS: ED CONSULT TO SENIOR SERVICES PHYSICAL THERAPY  ED CONSULT TO SENIOR SERVICES CASE MANAGEMENT  ED CONSULT TO Edgar Hassan  ED CONSULT TO SENIOR SERVICES PHARMACIST  ED CONSULT TO SENIOR SERVICES PHYSICAL THERAPY  IP CONSULT TO NEUROLOGY  IP CONSULT TO HOSPITALIST  IP CONSULT TO CARDIOLOGY  IP CONSULT TO VASCULAR SURGERY  IP CONSULT TO NEUROSURGERY    PROCEDURES/SURGERIES: * No surgery found *    ADMITTING DIAGNOSES & HOSPITAL COURSE:   Acute L basal ganglia CVA  Critical L ICA stenosis - 80%, surgery planned for next week. HTN    66y old female patient with PMH of CAD s/ CABG x3, DM, HTN, HLD, CKD, anxiety and chronic left shoulder s/p dislocation presented to Ed for evaluation of bilateral leg weakness.  difficulty ambulation,  weak,the left more than the right. MRI showed an acute stroke, plavix was added to her aspirin. Echo done and normal. LT ICA was critically stenosis, and vascular surgery planning on intervention next week. PT/OT recommends inpatient rehab or SNF, Pt lives alone, and is unable to care for herself. Plan to discharge to SNF with readmission for surgery next week. HTN, but likely attributed to anxiety, had been well controlled on 07/17. Will need to continue monitoring BP, as may actually become low, if anxiety improves.    DISCHARGE DIAGNOSES / PLAN:      Acute CVA Lt basal ganglia, noted on MRI 7/12 - LE weakness  -initial CTAshow 80% stenosis,prox LT ICA,50% stenosis, surgery planned for next week with vascular.   -on ASA, plavix added, statin   -echo normal  -PT/OT. recc IP rehab     Critical LT ICA 80%  -vascular Sx consult  -will need time to recover plan for surgery next week, vascular surgery Dr. Ana Rosa Montgomery to arrange via office for next week. -c/w asa ,plavix      Neck pain/weakness - cervical spine stenosis   - PT/OT     HTN- uncontrolled, BP better after increased hydralazine dose. - C/w coreg, increase hydralazine oral today      Hx CAD s/p CABG  - home meds coreg, statin     Elevated Troponin,non specific in setting of acute stroke.  -minimally elevated ,with trend downand is flat. - Echo w/o preserved EF and no wall motion abnormality     GERD - protonix     CKD lll - cr 1.2 , near baseline, will monitor      Left shoulder pain- chronic     Anxiety - c/w home meds  Abilify, Cymbalta, hydroxyzine      weight loss,moderate chronic protein calorie maturation  -nutrition consult     ADDITIONAL CARE RECOMMENDATIONS:   1. Please take all medications as prescribed. Note changes as below. 2. Please make sure to follow up with your primary care physician within 1-2 weeks of discharge for hospital follow up. You will need to come back for vascular surgery next week. Dr. Patty Ewing office should contact you regarding scheduling this. 3 .Avoid tobacco, alcohol and other illicit drug use.    4. You must STOP your plavix on Monday 07/22/19, but you should continue your aspirin    PENDING TEST RESULTS:   At the time of discharge the following test results are still pending: None    FOLLOW UP APPOINTMENTS:    Follow-up Information     Follow up With Specialties Details Why Contact Info    [employer]  [facility]  you will be seen Thrusday 7/18/19  [address]  1st [address] [facility] Cely Fernández In 1 week  [address] [facility] [address] [facility]      Nahid Mcmillan MD Vascular Surgery In 1 week The office should contact you to schedule vascular carotid surgery next week. If you do not hear from them, please give them a call. [address] [place] [address].  [phone]      SGZVEHVW Emigdio Cunha On 7/17/2019 9am transport set up to [address] [facility]  [phone]               DIET: Cardiac Diet    ACTIVITY: PT/OT Eval and treat    WOUND CARE: None    EQUIPMENT needed: Per PT/OT      DISCHARGE MEDICATIONS:  Current Discharge Medication List      START taking these medications    Details   atorvastatin (LIPITOR) 40 mg tablet Take 2 Tabs by mouth daily for 30 days. Qty: 60 Tab, Refills: 0      aspirin 81 mg chewable tablet Take 1 Tab by mouth daily for 30 days. Qty: 30 Tab, Refills: 0      clopidogrel (PLAVIX) 75 mg tab Take 1 Tab by mouth daily for 30 days. Qty: 30 Tab, Refills: 0         CONTINUE these medications which have CHANGED    Details   carvedilol (COREG) 12.5 mg tablet Take 2 Tabs by mouth two (2) times daily (with meals). Qty: 180 Tab, Refills: 3      hydrALAZINE (APRESOLINE) 100 mg tablet Take 1 Tab by mouth three (3) times daily for 30 days. Qty: 90 Tab, Refills: 0      zolpidem (AMBIEN) 10 mg tablet Take 0.5 Tabs by mouth nightly as needed for Sleep for up to 30 days. Max Daily Amount: 5 mg. Qty: 30 Tab, Refills: 0    Associated Diagnoses: Primary insomnia         CONTINUE these medications which have NOT CHANGED    Details   cyanocobalamin (VITAMIN B12) 100 mcg tablet Take 100 mcg by mouth daily. primidone (MYSOLINE) 50 mg tablet Take 2 Tabs by mouth two (2) times a day. Qty: 120 Tab, Refills: 5      hydrOXYzine HCl (ATARAX) 50 mg tablet Take 50 mg by mouth every six (6) hours. ARIPiprazole (ABILIFY) 10 mg tablet Take 10 mg by mouth daily. Indications: Additional Medications to Treat Depression      methocarbamol (ROBAXIN) 750 mg tablet Take 750 mg by mouth three (3) times daily.  Indications: muscle spasm      Cholecalciferol, Vitamin D3, 1,000 unit cap Take 1,000 Units by mouth daily. LACTOBACILLUS RHAMNOSUS GG (CULTURELLE PO) Take 1 Tab by mouth daily. Associated Diagnoses: Paroxysmal atrial fibrillation ([address] Coronary artery disease involving native coronary artery without angina pectoris; Bradycardia; Edema; Type 2 diabetes mellitus without complication (HCC)      pantoprazole (PROTONIX) 40 mg tablet Take 40 mg by mouth Daily (before breakfast). Indications: h/o bleeding ulcer with nsaid      DULoxetine (CYMBALTA) 60 mg capsule Take 120 mg by mouth two (2) times a day. Indications: Anxiousness associated with Depression      multivitamins-minerals-lutein (CENTRUM SILVER) tab tablet Take 1 Tab by mouth daily. diazePAM (VALIUM) 2 mg tablet Take  by mouth every six (6) hours as needed for Anxiety. Indications: anxious      vit C,E-Lw-geuvg-lutein-zeaxan (PRESERVISION AREDS-2) 593-212-45-1 mg-unit-mg-mg cap capsule Take 1 Cap by mouth two (2) times a day. senna-docusate (SENNA WITH DOCUSATE SODIUM) 8.6-50 mg per tablet Take 1 Tab by mouth daily as needed for Constipation. STOP taking these medications       pravastatin (PRAVACHOL) 40 mg tablet Comments:   Reason for Stopping:                 NOTIFY YOUR PHYSICIAN FOR ANY OF THE FOLLOWING:   Fever over 101 degrees for 24 hours. Chest pain, shortness of breath, fever, chills, nausea, vomiting, diarrhea, change in mentation, falling, weakness, bleeding. Severe pain or pain not relieved by medications. Or, any other signs or symptoms that you may have questions about.     DISPOSITION:    Home With:   OT  PT  HH  RN      X Long term SNF/Inpatient Rehab    Independent/assisted living    Hospice    Other:       PATIENT CONDITION AT DISCHARGE:     Functional status    Poor    X Deconditioned     Independent      Cognition    X Lucid     Forgetful     Dementia      Catheters/lines (plus indication)    Sue     PICC     PEG    X None      Code status   X  Full code DNR      PHYSICAL EXAMINATION AT DISCHARGE:  Visit Vitals  /81 (BP 1 Location: Right arm, BP Patient Position: At rest)   Pulse 72   Temp 98.4 °F (36.9 °C)   Resp 16   Ht 5' 5\" (1.651 m)   Wt 74 kg (163 lb 2.3 oz)   SpO2 98%   Breastfeeding? No   BMI 27.15 kg/m²     Repeat /74    Gen: NAD, awake in bed  HEENT: NC/AT, sclera anicteric, PERRL, EOMI  CV: RRR no m/r/g  Resp: CTA b/l no increased work of breathing  Abd: NT/ND, normal bowel sounds  Ext: 2+ pulses, no edema, bilateral LE weakness. Skin: No rashes        CHRONIC MEDICAL DIAGNOSES:  Problem List as of 7/18/2019 Date Reviewed: 2/12/2019          Codes Class Noted - Resolved    HTN (hypertension) ICD-10-CM: I10  ICD-9-CM: 401.9  7/17/2019 - Present        * (Principal) Acute ischemic stroke ([facility] ICD-10-CM: I63.9  ICD-9-CM: 434.91  7/12/2019 - Present        Fall ICD-10-CM: W19. [place] Fought  ICD-9-CM: E888.9  12/24/2018 - Present        CKD (chronic kidney disease), stage III ([facility] ICD-10-CM: N18.3  ICD-9-CM: 585.3  7/8/2015 - Present        Edema ICD-10-CM: R60.9  ICD-9-CM: 782.3  5/6/2015 - Present        Diabetes mellitus ([facility] ICD-10-CM: E11.9  ICD-9-CM: 250.00  5/6/2015 - Present        Postoperative anemia due to acute blood loss ICD-10-CM: D62  ICD-9-CM: 285.1  2/14/2015 - Present        S/P CABG (coronary artery bypass graft) ICD-10-CM: Z95.1  ICD-9-CM: V45.81  2/13/2015 - Present        Syncope ICD-10-CM: R55  ICD-9-CM: 780.2  2/9/2015 - Present        Bradycardia ICD-10-CM: R00.1  ICD-9-CM: 427.89  2/9/2015 - Present        MORENO (acute kidney injury) ([facility] ICD-10-CM: N17.9  ICD-9-CM: 584.9  2/9/2015 - Present        Anemia ICD-10-CM: D64.9  ICD-9-CM: 285.9  9/9/2014 - Present        GI bleed ICD-10-CM: K92.2  ICD-9-CM: 578.9  9/9/2014 - Present        AF (atrial fibrillation) (HCC) ICD-10-CM: I48.91  ICD-9-CM: 427.31  6/20/2014 - Present        Hypotension ICD-10-CM: I95.9  ICD-9-CM: 458.9  6/3/2014 - Present        CAD (coronary artery disease) ICD-10-CM: I25.10  ICD-9-CM: 414.00  6/3/2014 - Present    Overview Signed 2/10/2015 11:06 AM by Silvano November 2007 PCI x2 to LAD with STEPHAN             S/P coronary artery stent placement ICD-10-CM: Z95.5  ICD-9-CM: V45.82  6/3/2014 - Present        Renal failure ICD-10-CM: N19  ICD-9-CM: 381  6/3/2014 - Present        Elevated troponin ICD-10-CM: R74.8  ICD-9-CM: 790.6  6/3/2014 - Present        Pericardial effusion ICD-10-CM: I31.3  ICD-9-CM: 423.9  6/3/2014 - Present        Lactic acidosis ICD-10-CM: E87.2  ICD-9-CM: 276.2  6/3/2014 - Present              Greater than 30 minutes were spent with the patient on counseling and coordination of care    Signed:   Nicolette Rangel MD  7/18/2019  3:20 PM

## 2019-07-18 NOTE — PROGRESS NOTES
Vascular  Home on aspirin and plavix  Last dose of plavix should be Monday  Do not stop aspirin  Surgery scheduled for next Friday

## 2019-07-18 NOTE — PROGRESS NOTES
Pt BP was elevated after multiple retakes. PRN medication was given   0614- BP was elevated. Paged NP. Order to continue to monitor was given.  Pt is schedule for am BP meds

## 2019-07-18 NOTE — PROGRESS NOTES
Problem: Falls - Risk of  Goal: *Absence of Falls  Description  Document Shmuel Quiroz Fall Risk and appropriate interventions in the flowsheet.   Outcome: Progressing Towards Goal  Note:   Fall Risk Interventions:  Mobility Interventions: Communicate number of staff needed for ambulation/transfer, Patient to call before getting OOB, PT Consult for mobility concerns, PT Consult for assist device competence, Utilize walker, cane, or other assistive device         Medication Interventions: Evaluate medications/consider consulting pharmacy, Patient to call before getting OOB, Teach patient to arise slowly    Elimination Interventions: Call light in reach, Patient to call for help with toileting needs, Toileting schedule/hourly rounds, Stay With Me (per policy), Toilet paper/wipes in reach    History of Falls Interventions: Door open when patient unattended         Problem: Patient Education: Go to Patient Education Activity  Goal: Patient/Family Education  Outcome: Progressing Towards Goal

## 2019-07-24 RX ORDER — ACETAMINOPHEN 325 MG/1
650 TABLET ORAL
COMMUNITY

## 2019-07-24 NOTE — PERIOP NOTES
CALLED FOR AND REC'D M[facility] WAITING TO HEAR BACK FROM DR. BUTCHER'S OFFICE-SURGEON, ON PRE OP DIRECTIONS FOR ASA AND PLAVIX. ONCE REC'D WILL FAX PRE OP DIRECTIONS TO [facility].

## 2019-07-25 NOTE — PERIOP NOTES
SPOKE WITH JUAN- NURSE MANAGER  [facility], SHE WAS INSTRUCTED TO STOP ASPIRIN AND PLAVIX NOW, IF NOT ALREADY STOPPED, PER DR. Gutierrez Legamber OFFICE (SPOKE WITH GREGORY WHO STATED DR. Reyna Berman SAID PT SHOULD HAVE ALREADY STOPPED THESE, BUT IF NOT STOP NOW). PRE OP DIRECTIONS FAXED TO [facility].

## 2019-07-26 ENCOUNTER — HOSPITAL ENCOUNTER (INPATIENT)
Age: 79
LOS: 5 days | Discharge: NURSING, REHAB OR LONG-TERM CARE | DRG: 038 | End: 2019-07-31
Attending: SURGERY | Admitting: SURGERY
Payer: MEDICARE

## 2019-07-26 ENCOUNTER — ANESTHESIA (OUTPATIENT)
Dept: SURGERY | Age: 79
DRG: 038 | End: 2019-07-26
Payer: MEDICARE

## 2019-07-26 ENCOUNTER — ANESTHESIA EVENT (OUTPATIENT)
Dept: SURGERY | Age: 79
DRG: 038 | End: 2019-07-26
Payer: MEDICARE

## 2019-07-26 DIAGNOSIS — I65.22 CAROTID ARTERY STENOSIS, SYMPTOMATIC, LEFT: Primary | ICD-10-CM

## 2019-07-26 LAB
GLUCOSE BLD STRIP.AUTO-MCNC: 126 MG/DL (ref 65–100)
GLUCOSE BLD STRIP.AUTO-MCNC: 139 MG/DL (ref 65–100)
SERVICE CMNT-IMP: ABNORMAL
SERVICE CMNT-IMP: ABNORMAL

## 2019-07-26 PROCEDURE — 74011250636 HC RX REV CODE- 250/636

## 2019-07-26 PROCEDURE — 77030013079 HC BLNKT BAIR HGGR 3M -A: Performed by: ANESTHESIOLOGY

## 2019-07-26 PROCEDURE — 74011250637 HC RX REV CODE- 250/637: Performed by: ANESTHESIOLOGY

## 2019-07-26 PROCEDURE — 74011250637 HC RX REV CODE- 250/637: Performed by: SURGERY

## 2019-07-26 PROCEDURE — 77030005402 HC CATH RAD ART LN KT TELE -B

## 2019-07-26 PROCEDURE — 77030002933 HC SUT MCRYL J&J -A: Performed by: SURGERY

## 2019-07-26 PROCEDURE — 77030020256 HC SOL INJ NACL 0.9%  500ML: Performed by: SURGERY

## 2019-07-26 PROCEDURE — 76060000036 HC ANESTHESIA 2.5 TO 3 HR: Performed by: SURGERY

## 2019-07-26 PROCEDURE — 88311 DECALCIFY TISSUE: CPT

## 2019-07-26 PROCEDURE — 77030026438 HC STYL ET INTUB CARD -A: Performed by: ANESTHESIOLOGY

## 2019-07-26 PROCEDURE — 74011000250 HC RX REV CODE- 250

## 2019-07-26 PROCEDURE — 77030008703 HC TU ET UNCUF COVD -A: Performed by: ANESTHESIOLOGY

## 2019-07-26 PROCEDURE — 77030039266 HC ADH SKN EXOFIN S2SG -A: Performed by: SURGERY

## 2019-07-26 PROCEDURE — 76010000172 HC OR TIME 2.5 TO 3 HR INTENSV-TIER 1: Performed by: SURGERY

## 2019-07-26 PROCEDURE — 77030040361 HC SLV COMPR DVT MDII -B: Performed by: SURGERY

## 2019-07-26 PROCEDURE — 77030002986 HC SUT PROL J&J -A: Performed by: SURGERY

## 2019-07-26 PROCEDURE — 77030012406 HC DRN WND PENRS BARD -A: Performed by: SURGERY

## 2019-07-26 PROCEDURE — 77030018846 HC SOL IRR STRL H20 ICUM -A: Performed by: SURGERY

## 2019-07-26 PROCEDURE — 88304 TISSUE EXAM BY PATHOLOGIST: CPT

## 2019-07-26 PROCEDURE — 74011250636 HC RX REV CODE- 250/636: Performed by: SURGERY

## 2019-07-26 PROCEDURE — 65660000000 HC RM CCU STEPDOWN

## 2019-07-26 PROCEDURE — 77030013567 HC DRN WND RESERV BARD -A: Performed by: SURGERY

## 2019-07-26 PROCEDURE — 74011000258 HC RX REV CODE- 258

## 2019-07-26 PROCEDURE — 77030002916 HC SUT ETHLN J&J -A: Performed by: SURGERY

## 2019-07-26 PROCEDURE — 77030013797 HC KT TRNSDUC PRSSR EDWD -A

## 2019-07-26 PROCEDURE — 77030014008 HC SPNG HEMSTAT J&J -C: Performed by: SURGERY

## 2019-07-26 PROCEDURE — 77030031139 HC SUT VCRL2 J&J -A: Performed by: SURGERY

## 2019-07-26 PROCEDURE — 77030018824 HC SHNT CAR ARGY COVD -B: Performed by: SURGERY

## 2019-07-26 PROCEDURE — 77030005537 HC CATH URETH BARD -A: Performed by: SURGERY

## 2019-07-26 PROCEDURE — 77030005401 HC CATH RAD ARRO -A: Performed by: ANESTHESIOLOGY

## 2019-07-26 PROCEDURE — 82962 GLUCOSE BLOOD TEST: CPT

## 2019-07-26 PROCEDURE — 74011250636 HC RX REV CODE- 250/636: Performed by: ANESTHESIOLOGY

## 2019-07-26 PROCEDURE — 77030020782 HC GWN BAIR PAWS FLX 3M -B

## 2019-07-26 PROCEDURE — 77030008684 HC TU ET CUF COVD -B: Performed by: ANESTHESIOLOGY

## 2019-07-26 PROCEDURE — 51798 US URINE CAPACITY MEASURE: CPT

## 2019-07-26 PROCEDURE — 77030011640 HC PAD GRND REM COVD -A: Performed by: SURGERY

## 2019-07-26 PROCEDURE — 03CL0ZZ EXTIRPATION OF MATTER FROM LEFT INTERNAL CAROTID ARTERY, OPEN APPROACH: ICD-10-PCS | Performed by: SURGERY

## 2019-07-26 PROCEDURE — 76210000017 HC OR PH I REC 1.5 TO 2 HR: Performed by: SURGERY

## 2019-07-26 PROCEDURE — 77030002987 HC SUT PROL J&J -B: Performed by: SURGERY

## 2019-07-26 RX ORDER — MIDAZOLAM HYDROCHLORIDE 1 MG/ML
1 INJECTION, SOLUTION INTRAMUSCULAR; INTRAVENOUS AS NEEDED
Status: DISCONTINUED | OUTPATIENT
Start: 2019-07-26 | End: 2019-07-26 | Stop reason: HOSPADM

## 2019-07-26 RX ORDER — OXYCODONE HYDROCHLORIDE 5 MG/1
5 TABLET ORAL
Status: DISCONTINUED | OUTPATIENT
Start: 2019-07-26 | End: 2019-07-29

## 2019-07-26 RX ORDER — DEXAMETHASONE SODIUM PHOSPHATE 4 MG/ML
INJECTION, SOLUTION INTRA-ARTICULAR; INTRALESIONAL; INTRAMUSCULAR; INTRAVENOUS; SOFT TISSUE AS NEEDED
Status: DISCONTINUED | OUTPATIENT
Start: 2019-07-26 | End: 2019-07-26 | Stop reason: HOSPADM

## 2019-07-26 RX ORDER — PROPOFOL 10 MG/ML
INJECTION, EMULSION INTRAVENOUS AS NEEDED
Status: DISCONTINUED | OUTPATIENT
Start: 2019-07-26 | End: 2019-07-26 | Stop reason: HOSPADM

## 2019-07-26 RX ORDER — PHENYLEPHRINE HCL IN 0.9% NACL 0.4MG/10ML
SYRINGE (ML) INTRAVENOUS AS NEEDED
Status: DISCONTINUED | OUTPATIENT
Start: 2019-07-26 | End: 2019-07-26 | Stop reason: HOSPADM

## 2019-07-26 RX ORDER — SODIUM CHLORIDE, SODIUM LACTATE, POTASSIUM CHLORIDE, CALCIUM CHLORIDE 600; 310; 30; 20 MG/100ML; MG/100ML; MG/100ML; MG/100ML
125 INJECTION, SOLUTION INTRAVENOUS CONTINUOUS
Status: DISCONTINUED | OUTPATIENT
Start: 2019-07-26 | End: 2019-07-26 | Stop reason: HOSPADM

## 2019-07-26 RX ORDER — HYDROMORPHONE HYDROCHLORIDE 1 MG/ML
0.2 INJECTION, SOLUTION INTRAMUSCULAR; INTRAVENOUS; SUBCUTANEOUS
Status: COMPLETED | OUTPATIENT
Start: 2019-07-26 | End: 2019-07-26

## 2019-07-26 RX ORDER — GLYCOPYRROLATE 0.2 MG/ML
INJECTION INTRAMUSCULAR; INTRAVENOUS AS NEEDED
Status: DISCONTINUED | OUTPATIENT
Start: 2019-07-26 | End: 2019-07-26 | Stop reason: HOSPADM

## 2019-07-26 RX ORDER — FENTANYL CITRATE 50 UG/ML
25 INJECTION, SOLUTION INTRAMUSCULAR; INTRAVENOUS
Status: COMPLETED | OUTPATIENT
Start: 2019-07-26 | End: 2019-07-26

## 2019-07-26 RX ORDER — ATORVASTATIN CALCIUM 40 MG/1
80 TABLET, FILM COATED ORAL DAILY
Status: DISCONTINUED | OUTPATIENT
Start: 2019-07-27 | End: 2019-07-31 | Stop reason: HOSPADM

## 2019-07-26 RX ORDER — HYDROXYZINE 25 MG/1
50 TABLET, FILM COATED ORAL 3 TIMES DAILY
Status: DISCONTINUED | OUTPATIENT
Start: 2019-07-26 | End: 2019-07-31 | Stop reason: HOSPADM

## 2019-07-26 RX ORDER — SODIUM CHLORIDE 0.9 % (FLUSH) 0.9 %
5-40 SYRINGE (ML) INJECTION AS NEEDED
Status: DISCONTINUED | OUTPATIENT
Start: 2019-07-26 | End: 2019-07-31 | Stop reason: HOSPADM

## 2019-07-26 RX ORDER — ZOLPIDEM TARTRATE 5 MG/1
5 TABLET ORAL
Status: DISCONTINUED | OUTPATIENT
Start: 2019-07-26 | End: 2019-07-31 | Stop reason: HOSPADM

## 2019-07-26 RX ORDER — ONDANSETRON 2 MG/ML
4 INJECTION INTRAMUSCULAR; INTRAVENOUS AS NEEDED
Status: DISCONTINUED | OUTPATIENT
Start: 2019-07-26 | End: 2019-07-26 | Stop reason: HOSPADM

## 2019-07-26 RX ORDER — ACETAMINOPHEN 325 MG/1
650 TABLET ORAL ONCE
Status: COMPLETED | OUTPATIENT
Start: 2019-07-26 | End: 2019-07-26

## 2019-07-26 RX ORDER — HYDROMORPHONE HYDROCHLORIDE 1 MG/ML
0.5 INJECTION, SOLUTION INTRAMUSCULAR; INTRAVENOUS; SUBCUTANEOUS
Status: DISCONTINUED | OUTPATIENT
Start: 2019-07-26 | End: 2019-07-31 | Stop reason: HOSPADM

## 2019-07-26 RX ORDER — HEPARIN SODIUM 1000 [USP'U]/ML
INJECTION, SOLUTION INTRAVENOUS; SUBCUTANEOUS AS NEEDED
Status: DISCONTINUED | OUTPATIENT
Start: 2019-07-26 | End: 2019-07-26 | Stop reason: HOSPADM

## 2019-07-26 RX ORDER — DULOXETIN HYDROCHLORIDE 60 MG/1
120 CAPSULE, DELAYED RELEASE ORAL DAILY
Status: DISCONTINUED | OUTPATIENT
Start: 2019-07-27 | End: 2019-07-31 | Stop reason: HOSPADM

## 2019-07-26 RX ORDER — DIAZEPAM 2 MG/1
2 TABLET ORAL 3 TIMES DAILY
Status: DISCONTINUED | OUTPATIENT
Start: 2019-07-26 | End: 2019-07-31 | Stop reason: HOSPADM

## 2019-07-26 RX ORDER — SODIUM CHLORIDE 9 MG/ML
25 INJECTION, SOLUTION INTRAVENOUS CONTINUOUS
Status: DISCONTINUED | OUTPATIENT
Start: 2019-07-26 | End: 2019-07-26 | Stop reason: HOSPADM

## 2019-07-26 RX ORDER — ROCURONIUM BROMIDE 10 MG/ML
INJECTION, SOLUTION INTRAVENOUS AS NEEDED
Status: DISCONTINUED | OUTPATIENT
Start: 2019-07-26 | End: 2019-07-26 | Stop reason: HOSPADM

## 2019-07-26 RX ORDER — AMOXICILLIN 250 MG
1 CAPSULE ORAL
Status: DISCONTINUED | OUTPATIENT
Start: 2019-07-26 | End: 2019-07-31 | Stop reason: HOSPADM

## 2019-07-26 RX ORDER — LIDOCAINE HYDROCHLORIDE 20 MG/ML
INJECTION, SOLUTION EPIDURAL; INFILTRATION; INTRACAUDAL; PERINEURAL AS NEEDED
Status: DISCONTINUED | OUTPATIENT
Start: 2019-07-26 | End: 2019-07-26 | Stop reason: HOSPADM

## 2019-07-26 RX ORDER — CEFAZOLIN SODIUM/WATER 2 G/20 ML
2 SYRINGE (ML) INTRAVENOUS ONCE
Status: DISCONTINUED | OUTPATIENT
Start: 2019-07-26 | End: 2019-07-26 | Stop reason: HOSPADM

## 2019-07-26 RX ORDER — SODIUM CHLORIDE, SODIUM LACTATE, POTASSIUM CHLORIDE, CALCIUM CHLORIDE 600; 310; 30; 20 MG/100ML; MG/100ML; MG/100ML; MG/100ML
INJECTION, SOLUTION INTRAVENOUS
Status: DISCONTINUED | OUTPATIENT
Start: 2019-07-26 | End: 2019-07-26 | Stop reason: HOSPADM

## 2019-07-26 RX ORDER — SODIUM CHLORIDE 0.9 % (FLUSH) 0.9 %
5-40 SYRINGE (ML) INJECTION AS NEEDED
Status: DISCONTINUED | OUTPATIENT
Start: 2019-07-26 | End: 2019-07-26 | Stop reason: HOSPADM

## 2019-07-26 RX ORDER — ACETAMINOPHEN 325 MG/1
650 TABLET ORAL
Status: DISCONTINUED | OUTPATIENT
Start: 2019-07-26 | End: 2019-07-31 | Stop reason: HOSPADM

## 2019-07-26 RX ORDER — LIDOCAINE HYDROCHLORIDE 10 MG/ML
0.1 INJECTION, SOLUTION EPIDURAL; INFILTRATION; INTRACAUDAL; PERINEURAL AS NEEDED
Status: DISCONTINUED | OUTPATIENT
Start: 2019-07-26 | End: 2019-07-26 | Stop reason: HOSPADM

## 2019-07-26 RX ORDER — GUAIFENESIN 100 MG/5ML
81 LIQUID (ML) ORAL DAILY
Status: DISCONTINUED | OUTPATIENT
Start: 2019-07-27 | End: 2019-07-31 | Stop reason: HOSPADM

## 2019-07-26 RX ORDER — SUCCINYLCHOLINE CHLORIDE 20 MG/ML
INJECTION INTRAMUSCULAR; INTRAVENOUS AS NEEDED
Status: DISCONTINUED | OUTPATIENT
Start: 2019-07-26 | End: 2019-07-26 | Stop reason: HOSPADM

## 2019-07-26 RX ORDER — HYDRALAZINE HYDROCHLORIDE 50 MG/1
100 TABLET, FILM COATED ORAL 3 TIMES DAILY
Status: DISCONTINUED | OUTPATIENT
Start: 2019-07-26 | End: 2019-07-31 | Stop reason: HOSPADM

## 2019-07-26 RX ORDER — NALOXONE HYDROCHLORIDE 0.4 MG/ML
0.4 INJECTION, SOLUTION INTRAMUSCULAR; INTRAVENOUS; SUBCUTANEOUS AS NEEDED
Status: DISCONTINUED | OUTPATIENT
Start: 2019-07-26 | End: 2019-07-31 | Stop reason: HOSPADM

## 2019-07-26 RX ORDER — ARIPIPRAZOLE 10 MG/1
10 TABLET ORAL DAILY
Status: DISCONTINUED | OUTPATIENT
Start: 2019-07-27 | End: 2019-07-31 | Stop reason: HOSPADM

## 2019-07-26 RX ORDER — CEFAZOLIN SODIUM IN 0.9 % NACL 2 G/100 ML
PLASTIC BAG, INJECTION (ML) INTRAVENOUS AS NEEDED
Status: DISCONTINUED | OUTPATIENT
Start: 2019-07-26 | End: 2019-07-26 | Stop reason: HOSPADM

## 2019-07-26 RX ORDER — MORPHINE SULFATE 10 MG/ML
2 INJECTION, SOLUTION INTRAMUSCULAR; INTRAVENOUS
Status: DISCONTINUED | OUTPATIENT
Start: 2019-07-26 | End: 2019-07-26 | Stop reason: HOSPADM

## 2019-07-26 RX ORDER — CARVEDILOL 12.5 MG/1
25 TABLET ORAL 2 TIMES DAILY WITH MEALS
Status: DISCONTINUED | OUTPATIENT
Start: 2019-07-26 | End: 2019-07-31 | Stop reason: HOSPADM

## 2019-07-26 RX ORDER — ACETAMINOPHEN 10 MG/ML
INJECTION, SOLUTION INTRAVENOUS AS NEEDED
Status: DISCONTINUED | OUTPATIENT
Start: 2019-07-26 | End: 2019-07-26 | Stop reason: HOSPADM

## 2019-07-26 RX ORDER — PANTOPRAZOLE SODIUM 40 MG/1
40 TABLET, DELAYED RELEASE ORAL
Status: DISCONTINUED | OUTPATIENT
Start: 2019-07-27 | End: 2019-07-31 | Stop reason: HOSPADM

## 2019-07-26 RX ORDER — SODIUM CHLORIDE 0.9 % (FLUSH) 0.9 %
5-40 SYRINGE (ML) INJECTION EVERY 8 HOURS
Status: DISCONTINUED | OUTPATIENT
Start: 2019-07-26 | End: 2019-07-26 | Stop reason: HOSPADM

## 2019-07-26 RX ORDER — DIPHENHYDRAMINE HYDROCHLORIDE 50 MG/ML
12.5 INJECTION, SOLUTION INTRAMUSCULAR; INTRAVENOUS AS NEEDED
Status: DISCONTINUED | OUTPATIENT
Start: 2019-07-26 | End: 2019-07-26 | Stop reason: HOSPADM

## 2019-07-26 RX ORDER — METHOCARBAMOL 750 MG/1
750 TABLET, FILM COATED ORAL
Status: DISCONTINUED | OUTPATIENT
Start: 2019-07-26 | End: 2019-07-31 | Stop reason: HOSPADM

## 2019-07-26 RX ORDER — DEXTROSE, SODIUM CHLORIDE, AND POTASSIUM CHLORIDE 5; .45; .15 G/100ML; G/100ML; G/100ML
75 INJECTION INTRAVENOUS CONTINUOUS
Status: DISCONTINUED | OUTPATIENT
Start: 2019-07-26 | End: 2019-07-27

## 2019-07-26 RX ORDER — SODIUM CHLORIDE 0.9 % (FLUSH) 0.9 %
5-40 SYRINGE (ML) INJECTION EVERY 8 HOURS
Status: DISCONTINUED | OUTPATIENT
Start: 2019-07-26 | End: 2019-07-31 | Stop reason: HOSPADM

## 2019-07-26 RX ORDER — FAMOTIDINE 10 MG/ML
INJECTION INTRAVENOUS AS NEEDED
Status: DISCONTINUED | OUTPATIENT
Start: 2019-07-26 | End: 2019-07-26 | Stop reason: HOSPADM

## 2019-07-26 RX ORDER — ONDANSETRON 2 MG/ML
4 INJECTION INTRAMUSCULAR; INTRAVENOUS
Status: DISCONTINUED | OUTPATIENT
Start: 2019-07-26 | End: 2019-07-31 | Stop reason: HOSPADM

## 2019-07-26 RX ORDER — UREA 10 %
100 LOTION (ML) TOPICAL DAILY
Status: DISCONTINUED | OUTPATIENT
Start: 2019-07-27 | End: 2019-07-31 | Stop reason: HOSPADM

## 2019-07-26 RX ORDER — ONDANSETRON 2 MG/ML
INJECTION INTRAMUSCULAR; INTRAVENOUS AS NEEDED
Status: DISCONTINUED | OUTPATIENT
Start: 2019-07-26 | End: 2019-07-26 | Stop reason: HOSPADM

## 2019-07-26 RX ORDER — PROTAMINE SULFATE 10 MG/ML
INJECTION, SOLUTION INTRAVENOUS AS NEEDED
Status: DISCONTINUED | OUTPATIENT
Start: 2019-07-26 | End: 2019-07-26 | Stop reason: HOSPADM

## 2019-07-26 RX ORDER — OXYCODONE AND ACETAMINOPHEN 5; 325 MG/1; MG/1
1 TABLET ORAL AS NEEDED
Status: DISCONTINUED | OUTPATIENT
Start: 2019-07-26 | End: 2019-07-26 | Stop reason: HOSPADM

## 2019-07-26 RX ORDER — FENTANYL CITRATE 50 UG/ML
50 INJECTION, SOLUTION INTRAMUSCULAR; INTRAVENOUS AS NEEDED
Status: DISCONTINUED | OUTPATIENT
Start: 2019-07-26 | End: 2019-07-26 | Stop reason: HOSPADM

## 2019-07-26 RX ORDER — PRIMIDONE 50 MG/1
100 TABLET ORAL 2 TIMES DAILY
Status: DISCONTINUED | OUTPATIENT
Start: 2019-07-26 | End: 2019-07-31 | Stop reason: HOSPADM

## 2019-07-26 RX ORDER — MIDAZOLAM HYDROCHLORIDE 1 MG/ML
0.5 INJECTION, SOLUTION INTRAMUSCULAR; INTRAVENOUS
Status: DISCONTINUED | OUTPATIENT
Start: 2019-07-26 | End: 2019-07-26 | Stop reason: HOSPADM

## 2019-07-26 RX ADMIN — HEPARIN SODIUM 7500 UNITS: 1000 INJECTION, SOLUTION INTRAVENOUS; SUBCUTANEOUS at 12:27

## 2019-07-26 RX ADMIN — PROTAMINE SULFATE 35 MG: 10 INJECTION, SOLUTION INTRAVENOUS at 13:28

## 2019-07-26 RX ADMIN — SENNOSIDES AND DOCUSATE SODIUM 1 TABLET: 8.6; 5 TABLET ORAL at 21:29

## 2019-07-26 RX ADMIN — PROTAMINE SULFATE 5 MG: 10 INJECTION, SOLUTION INTRAVENOUS at 13:41

## 2019-07-26 RX ADMIN — OXYCODONE HYDROCHLORIDE 5 MG: 5 TABLET ORAL at 17:39

## 2019-07-26 RX ADMIN — PRIMIDONE 100 MG: 50 TABLET ORAL at 17:39

## 2019-07-26 RX ADMIN — SODIUM CHLORIDE, SODIUM LACTATE, POTASSIUM CHLORIDE, AND CALCIUM CHLORIDE 125 ML/HR: 600; 310; 30; 20 INJECTION, SOLUTION INTRAVENOUS at 10:57

## 2019-07-26 RX ADMIN — HYDROXYZINE HYDROCHLORIDE 50 MG: 25 TABLET, FILM COATED ORAL at 17:39

## 2019-07-26 RX ADMIN — GLYCOPYRROLATE 0.4 MG: 0.2 INJECTION INTRAMUSCULAR; INTRAVENOUS at 12:05

## 2019-07-26 RX ADMIN — FENTANYL CITRATE 25 MCG: 50 INJECTION INTRAMUSCULAR; INTRAVENOUS at 14:49

## 2019-07-26 RX ADMIN — LIDOCAINE HYDROCHLORIDE 100 MG: 20 INJECTION, SOLUTION EPIDURAL; INFILTRATION; INTRACAUDAL; PERINEURAL at 11:36

## 2019-07-26 RX ADMIN — HYDRALAZINE HYDROCHLORIDE 100 MG: 50 TABLET, FILM COATED ORAL at 21:29

## 2019-07-26 RX ADMIN — ZOLPIDEM TARTRATE 5 MG: 5 TABLET ORAL at 23:07

## 2019-07-26 RX ADMIN — Medication 2 G: at 11:42

## 2019-07-26 RX ADMIN — ACETAMINOPHEN 650 MG: 325 TABLET ORAL at 11:12

## 2019-07-26 RX ADMIN — FENTANYL CITRATE 25 MCG: 50 INJECTION INTRAMUSCULAR; INTRAVENOUS at 15:02

## 2019-07-26 RX ADMIN — DEXAMETHASONE SODIUM PHOSPHATE 4 MG: 4 INJECTION, SOLUTION INTRA-ARTICULAR; INTRALESIONAL; INTRAMUSCULAR; INTRAVENOUS; SOFT TISSUE at 11:52

## 2019-07-26 RX ADMIN — ROCURONIUM BROMIDE 10 MG: 10 INJECTION, SOLUTION INTRAVENOUS at 11:36

## 2019-07-26 RX ADMIN — FENTANYL CITRATE 25 MCG: 50 INJECTION INTRAMUSCULAR; INTRAVENOUS at 14:56

## 2019-07-26 RX ADMIN — Medication 40 MCG: at 12:35

## 2019-07-26 RX ADMIN — HYDROMORPHONE HYDROCHLORIDE 0.2 MG: 1 INJECTION, SOLUTION INTRAMUSCULAR; INTRAVENOUS; SUBCUTANEOUS at 15:30

## 2019-07-26 RX ADMIN — ACETAMINOPHEN 1000 MG: 10 INJECTION, SOLUTION INTRAVENOUS at 11:26

## 2019-07-26 RX ADMIN — SUCCINYLCHOLINE CHLORIDE 100 MG: 20 INJECTION INTRAMUSCULAR; INTRAVENOUS at 11:36

## 2019-07-26 RX ADMIN — CARVEDILOL 25 MG: 12.5 TABLET, FILM COATED ORAL at 17:39

## 2019-07-26 RX ADMIN — HYDROXYZINE HYDROCHLORIDE 50 MG: 25 TABLET, FILM COATED ORAL at 21:29

## 2019-07-26 RX ADMIN — Medication 10 ML: at 17:40

## 2019-07-26 RX ADMIN — PROPOFOL 150 MG: 10 INJECTION, EMULSION INTRAVENOUS at 11:36

## 2019-07-26 RX ADMIN — ROCURONIUM BROMIDE 20 MG: 10 INJECTION, SOLUTION INTRAVENOUS at 11:43

## 2019-07-26 RX ADMIN — HYDROMORPHONE HYDROCHLORIDE 0.2 MG: 1 INJECTION, SOLUTION INTRAMUSCULAR; INTRAVENOUS; SUBCUTANEOUS at 15:45

## 2019-07-26 RX ADMIN — SODIUM CHLORIDE, SODIUM LACTATE, POTASSIUM CHLORIDE, CALCIUM CHLORIDE: 600; 310; 30; 20 INJECTION, SOLUTION INTRAVENOUS at 11:26

## 2019-07-26 RX ADMIN — FAMOTIDINE 20 MG: 10 INJECTION INTRAVENOUS at 11:32

## 2019-07-26 RX ADMIN — DEXTROSE MONOHYDRATE, SODIUM CHLORIDE, AND POTASSIUM CHLORIDE 75 ML/HR: 50; 4.5; 1.49 INJECTION, SOLUTION INTRAVENOUS at 15:45

## 2019-07-26 RX ADMIN — DIAZEPAM 2 MG: 2 TABLET ORAL at 17:40

## 2019-07-26 RX ADMIN — ONDANSETRON 4 MG: 2 INJECTION INTRAMUSCULAR; INTRAVENOUS at 11:52

## 2019-07-26 RX ADMIN — FENTANYL CITRATE 25 MCG: 50 INJECTION INTRAMUSCULAR; INTRAVENOUS at 15:07

## 2019-07-26 RX ADMIN — DIAZEPAM 2 MG: 2 TABLET ORAL at 21:29

## 2019-07-26 RX ADMIN — OXYCODONE HYDROCHLORIDE 5 MG: 5 TABLET ORAL at 21:29

## 2019-07-26 NOTE — ANESTHESIA PREPROCEDURE EVALUATION
Anesthetic History   No history of anesthetic complications            Review of Systems / Medical History  Patient summary reviewed, nursing notes reviewed and pertinent labs reviewed    Pulmonary  Within defined limits                 Neuro/Psych       CVA  Psychiatric history     Cardiovascular    Hypertension        Dysrhythmias : atrial fibrillation  CAD, cardiac stents and CABG    Exercise tolerance: <4 METS     GI/Hepatic/Renal     GERD    Renal disease      Comments: H/O GI bleed Endo/Other    Diabetes    Anemia     Other Findings              Physical Exam    Airway  Mallampati: II  TM Distance: > 6 cm  Neck ROM: normal range of motion   Mouth opening: Normal     Cardiovascular  Regular rate and rhythm,  S1 and S2 normal,  no murmur, click, rub, or gallop  Rhythm: regular  Rate: normal         Dental    Dentition: Bridges     Pulmonary  Breath sounds clear to auscultation               Abdominal  GI exam deferred       Other Findings            Anesthetic Plan    ASA: 3  Anesthesia type: general    Monitoring Plan: Arterial line    Post procedure ventilation   Induction: Inhalational  Anesthetic plan and risks discussed with: Patient

## 2019-07-26 NOTE — PERIOP NOTES
TRANSFER - OUT REPORT:    Verbal report given to NURSE QUINCY(name) on [employer]  being transferred to 3(unit) for routine post - op       Report consisted of patients Situation, Background, Assessment and   Recommendations(SBAR). Time Pre op antibiotic given:[email or web address]  Anesthesia Stop time: 1430  Sue Present on Transfer to [address] for Sue on Chart:NO  Discharge Prescriptions with Chart:NO    Information from the following report(s) SBAR, OR Summary, Intake/Output, MAR, Recent Results, Cardiac Rhythm SINUS FRANCES and Pre Procedure Checklist was reviewed with the receiving nurse. Opportunity for questions and clarification was provided. Is the patient on 02? YES       L/Min 2         Is the patient on a monitor? YES    Is the nurse transporting with the patient? YES    Surgical Waiting Area notified of patient's transfer from PACU?  YES      The following personal items collected during your admission accompanied patient upon transfer:   Dental Appliance: Dental Appliances: Partials(bridge)  Vision:    Hearing Aid:    Jewelry:    Clothing: Clothing: (clothing bag, w/c, purse to pacu; valuables to security)  Other Valuables:    Valuables sent to safe:

## 2019-07-26 NOTE — PERIOP NOTES
1100: Dr Jayant Taylor aware of pt having taken last dose of Plavix yesterday and pt's refusal of blood or blood products.

## 2019-07-26 NOTE — PERIOP NOTES
Patient noted with broken upper lip, dry gauze in place when received into PACU from the OR. \"dry lips\" as per CRNA.

## 2019-07-26 NOTE — ANESTHESIA POSTPROCEDURE EVALUATION
Post-Anesthesia Evaluation and Assessment    Patient: New Jeter MRN: [id number]  SSN: [id number]    YOB: 1940  Age: 67 y.o. Sex: female      I have evaluated the patient and they are stable and ready for discharge from the PACU. Cardiovascular Function/Vital Signs  Visit Vitals  /54   Pulse 66   Temp 36.9 °C (98.4 °F)   Resp 16   Ht 5' 5\" (1.651 m)   Wt 72.1 kg (159 lb)   SpO2 98%   BMI 26.46 kg/m²       Patient is status post General anesthesia for Procedure(s):  LEFT CAROTID ARTERY ENDARTERECTOMY. Nausea/Vomiting: None    Postoperative hydration reviewed and adequate. Pain:  Pain Scale 1: Numeric (0 - 10) (07/26/19 1004)  Pain Intensity 1: 0 (07/26/19 1004)   Managed    Neurological Status:   Neuro (WDL): (weakness in both legs; dizziness) (07/26/19 1017)   At baseline    Mental Status, Level of Consciousness: Alert and  oriented to person, place, and time    Pulmonary Status:   O2 Device: Nasal cannula (07/26/19 1429)   Adequate oxygenation and airway patent    Complications related to anesthesia: None    Post-anesthesia assessment completed. No concerns    Signed By: Mio Guillen MD     July 26, 2019              Procedure(s):  LEFT CAROTID ARTERY ENDARTERECTOMY. general    Anesthesia Post Evaluation        Patient location during evaluation: PACU  Patient participation: complete - patient participated  Level of consciousness: awake  Pain management: adequate  Airway patency: patent  Anesthetic complications: no  Cardiovascular status: hemodynamically stable  Respiratory status: acceptable  Hydration status: acceptable  Comments: I have seen and evaluated the patient. The patient is ready for PACU discharge.   [address], DO                         Vitals Value Taken Time   /48 7/26/2019  2:30 PM   Temp 36.9 °C (98.4 °F) 7/26/2019  2:29 PM   Pulse 66 7/26/2019  2:35 PM   Resp 16 7/26/2019  2:35 PM   SpO2 98 % 7/26/2019  2:35 PM   Vitals shown include unvalidated device data.

## 2019-07-26 NOTE — PROGRESS NOTES
Problem: Falls - Risk of  Goal: *Absence of Falls  Description  Document [facility] Fall Risk and appropriate interventions in the flowsheet. Outcome: Progressing Towards Goal  Note:   Fall Risk Interventions:  Mobility Interventions: OT consult for ADLs, Strengthening exercises (ROM-active/passive), PT Consult for mobility concerns, PT Consult for assist device competence         Medication Interventions: Patient to call before getting OOB, Teach patient to arise slowly                   Problem: Pressure Injury - Risk of  Goal: *Prevention of pressure injury  Description  Document Gregorio Scale and appropriate interventions in the flowsheet. Outcome: Progressing Towards Goal  Note:   Pressure Injury Interventions:  Sensory Interventions: Assess changes in LOC, Check visual cues for pain, Discuss PT/OT consult with provider    Moisture Interventions: Offer toileting Q_hr    Activity Interventions: Increase time out of bed, PT/OT evaluation    Mobility Interventions: Turn and reposition approx.  every two hours(pillow and wedges), PT/OT evaluation         Friction and Shear Interventions: Lift sheet, Lift team/patient mobility team                Problem: Pain  Goal: *Control of Pain  Outcome: Progressing Towards Goal     Problem: TIA/CVA Stroke: 0-24 hours  Goal: Activity/Safety  Outcome: Progressing Towards Goal  Goal: Diagnostic Test/Procedures  Outcome: Progressing Towards Goal  Goal: Nutrition/Diet  Outcome: Progressing Towards Goal  Goal: Medications  Outcome: Progressing Towards Goal  Goal: Respiratory  Outcome: Progressing Towards Goal

## 2019-07-26 NOTE — BRIEF OP NOTE
BRIEF OPERATIVE NOTE    Date of Procedure: 7/26/2019   Preoperative Diagnosis: CRITICAL SYMPTOMATIC STENOSIS LEFT ICA  Postoperative Diagnosis: CRITICAL SYMPTOMATIC STENOSIS LEFT ICA    Procedure(s):  LEFT CAROTID ARTERY ENDARTERECTOMY WITH PRIMARY CLOSURE  Surgeon(s) and Role:     * Wilver Chiu MD - Primary         Surgical Assistant: [facility]**    Surgical Staff:  Circ-1: Juan Antonio Bobo RN  Circ-Relief: Javier Deleon; Bubba Olsen RN  Scrub RN-1: Bubba Olsen RN  Scrub RN-Relief: Tiffanie Latham RN  Surg Asst-1: Chava Messina  Event Time In Time Out   Incision Start 1206    Incision Close       Anesthesia: General   Estimated Blood Loss: *100mls**  Specimens:   ID Type Source Tests Collected by Time Destination   1 : carotid plaque [place]  Wilver Chiu MD 7/26/2019 1251 Pathology      Findings: *severely calcified CCA and ICA which was very large; closed primarily**   Complications: **none*  Implants: * No implants in log *

## 2019-07-26 NOTE — ROUTINE PROCESS
Patient: Vivi Baker MRN: [id number]  SSN: [id number]   YOB: 1940  Age: 67 y.o. Sex: female     Patient is status post Procedure(s):  LEFT CAROTID ARTERY ENDARTERECTOMY. Surgeon(s) and Role:     * Keturah Alcaraz MD - Primary    Local/Dose/Irrigation:                    Peripheral IV 07/26/19 Left Arm (Active)   Site Assessment Clean, dry, & intact 7/26/2019 10:49 AM   Dressing Status Clean, dry, & intact 7/26/2019 10:49 AM   Dressing Type Transparent 7/26/2019 10:49 AM   Hub Color/Line Status Infusing 7/26/2019 10:49 AM      Arterial Line 07/26/19 Right Radial artery (Active)        Drain 07/26/19 Left Other (comment) (Active)                     Dressing/Packing:  Wound Neck Left -Dressing Type: 4 x 4;Adhesive wound closure strips (Steri-Strips); Adhesive wound dressing (Mastisol); Split gauze;Special tape (comment) (07/26/19 9334)    Splint/Cast:  ]    Other:  Scds; no pittman for case; drain to left neck; will not accept blood products; please have 1 channel pump available; arterial line

## 2019-07-27 LAB
ANION GAP SERPL CALC-SCNC: 6 MMOL/L (ref 5–15)
BUN SERPL-MCNC: 23 MG/DL (ref 6–20)
BUN/CREAT SERPL: 17 (ref 12–20)
CALCIUM SERPL-MCNC: 9.1 MG/DL (ref 8.5–10.1)
CHLORIDE SERPL-SCNC: 106 MMOL/L (ref 97–108)
CO2 SERPL-SCNC: 23 MMOL/L (ref 21–32)
CREAT SERPL-MCNC: 1.33 MG/DL (ref 0.55–1.02)
ERYTHROCYTE [DISTWIDTH] IN BLOOD BY AUTOMATED COUNT: 12.9 % (ref 11.5–14.5)
ERYTHROCYTE [DISTWIDTH] IN BLOOD BY AUTOMATED COUNT: 13 % (ref 11.5–14.5)
GLUCOSE SERPL-MCNC: 142 MG/DL (ref 65–100)
HCT VFR BLD AUTO: 25.8 % (ref 35–47)
HCT VFR BLD AUTO: 32.1 % (ref 35–47)
HGB BLD-MCNC: 10.2 G/DL (ref 11.5–16)
HGB BLD-MCNC: 8 G/DL (ref 11.5–16)
MCH RBC QN AUTO: 31 PG (ref 26–34)
MCH RBC QN AUTO: 31.3 PG (ref 26–34)
MCHC RBC AUTO-ENTMCNC: 31 G/DL (ref 30–36.5)
MCHC RBC AUTO-ENTMCNC: 31.8 G/DL (ref 30–36.5)
MCV RBC AUTO: 100 FL (ref 80–99)
MCV RBC AUTO: 98.5 FL (ref 80–99)
NRBC # BLD: 0 K/UL (ref 0–0.01)
NRBC # BLD: 0 K/UL (ref 0–0.01)
NRBC BLD-RTO: 0 PER 100 WBC
NRBC BLD-RTO: 0 PER 100 WBC
PLATELET # BLD AUTO: 155 K/UL (ref 150–400)
PLATELET # BLD AUTO: 192 K/UL (ref 150–400)
PMV BLD AUTO: 9.5 FL (ref 8.9–12.9)
PMV BLD AUTO: 9.6 FL (ref 8.9–12.9)
POTASSIUM SERPL-SCNC: 4.8 MMOL/L (ref 3.5–5.1)
RBC # BLD AUTO: 2.58 M/UL (ref 3.8–5.2)
RBC # BLD AUTO: 3.26 M/UL (ref 3.8–5.2)
SODIUM SERPL-SCNC: 135 MMOL/L (ref 136–145)
WBC # BLD AUTO: 5.8 K/UL (ref 3.6–11)
WBC # BLD AUTO: 7.1 K/UL (ref 3.6–11)

## 2019-07-27 PROCEDURE — 85027 COMPLETE CBC AUTOMATED: CPT

## 2019-07-27 PROCEDURE — 80048 BASIC METABOLIC PNL TOTAL CA: CPT

## 2019-07-27 PROCEDURE — 74011250636 HC RX REV CODE- 250/636: Performed by: SURGERY

## 2019-07-27 PROCEDURE — 65660000000 HC RM CCU STEPDOWN

## 2019-07-27 PROCEDURE — 74011250637 HC RX REV CODE- 250/637

## 2019-07-27 PROCEDURE — 94760 N-INVAS EAR/PLS OXIMETRY 1: CPT

## 2019-07-27 PROCEDURE — 36415 COLL VENOUS BLD VENIPUNCTURE: CPT

## 2019-07-27 PROCEDURE — 74011000250 HC RX REV CODE- 250

## 2019-07-27 PROCEDURE — 77010033678 HC OXYGEN DAILY

## 2019-07-27 PROCEDURE — 74011250637 HC RX REV CODE- 250/637: Performed by: SURGERY

## 2019-07-27 RX ORDER — BACITRACIN 500 UNIT/G
PACKET (EA) TOPICAL
Status: COMPLETED
Start: 2019-07-27 | End: 2019-07-27

## 2019-07-27 RX ORDER — HYDROCODONE BITARTRATE AND ACETAMINOPHEN 5; 325 MG/1; MG/1
1 TABLET ORAL
Status: DISCONTINUED | OUTPATIENT
Start: 2019-07-27 | End: 2019-07-31 | Stop reason: HOSPADM

## 2019-07-27 RX ADMIN — HYDROXYZINE HYDROCHLORIDE 50 MG: 25 TABLET, FILM COATED ORAL at 22:08

## 2019-07-27 RX ADMIN — DIAZEPAM 2 MG: 2 TABLET ORAL at 16:18

## 2019-07-27 RX ADMIN — HYDROXYZINE HYDROCHLORIDE 50 MG: 25 TABLET, FILM COATED ORAL at 16:19

## 2019-07-27 RX ADMIN — ATORVASTATIN CALCIUM 80 MG: 40 TABLET, FILM COATED ORAL at 08:38

## 2019-07-27 RX ADMIN — HYDRALAZINE HYDROCHLORIDE 100 MG: 50 TABLET, FILM COATED ORAL at 08:38

## 2019-07-27 RX ADMIN — SENNOSIDES AND DOCUSATE SODIUM 1 TABLET: 8.6; 5 TABLET ORAL at 22:08

## 2019-07-27 RX ADMIN — DEXTROSE MONOHYDRATE, SODIUM CHLORIDE, AND POTASSIUM CHLORIDE 75 ML/HR: 50; 4.5; 1.49 INJECTION, SOLUTION INTRAVENOUS at 07:13

## 2019-07-27 RX ADMIN — PRIMIDONE 100 MG: 50 TABLET ORAL at 17:45

## 2019-07-27 RX ADMIN — DIAZEPAM 2 MG: 2 TABLET ORAL at 22:08

## 2019-07-27 RX ADMIN — OXYCODONE HYDROCHLORIDE 5 MG: 5 TABLET ORAL at 08:39

## 2019-07-27 RX ADMIN — DIAZEPAM 2 MG: 2 TABLET ORAL at 08:56

## 2019-07-27 RX ADMIN — DULOXETINE HYDROCHLORIDE 120 MG: 60 CAPSULE, DELAYED RELEASE ORAL at 08:38

## 2019-07-27 RX ADMIN — PRIMIDONE 100 MG: 50 TABLET ORAL at 09:50

## 2019-07-27 RX ADMIN — ARIPIPRAZOLE 10 MG: 10 TABLET ORAL at 08:38

## 2019-07-27 RX ADMIN — BACITRACIN 1 PACKET: 500 OINTMENT TOPICAL at 16:20

## 2019-07-27 RX ADMIN — CARVEDILOL 25 MG: 12.5 TABLET, FILM COATED ORAL at 08:39

## 2019-07-27 RX ADMIN — HYDRALAZINE HYDROCHLORIDE 100 MG: 50 TABLET, FILM COATED ORAL at 16:19

## 2019-07-27 RX ADMIN — ASPIRIN 81 MG 81 MG: 81 TABLET ORAL at 08:39

## 2019-07-27 RX ADMIN — PANTOPRAZOLE SODIUM 40 MG: 40 TABLET, DELAYED RELEASE ORAL at 07:04

## 2019-07-27 RX ADMIN — CARVEDILOL 25 MG: 12.5 TABLET, FILM COATED ORAL at 16:13

## 2019-07-27 RX ADMIN — VITAM B12 100 MCG: 100 TAB at 08:39

## 2019-07-27 RX ADMIN — HYDROXYZINE HYDROCHLORIDE 50 MG: 25 TABLET, FILM COATED ORAL at 08:39

## 2019-07-27 RX ADMIN — Medication 10 ML: at 16:20

## 2019-07-27 RX ADMIN — HYDRALAZINE HYDROCHLORIDE 100 MG: 50 TABLET, FILM COATED ORAL at 22:07

## 2019-07-27 RX ADMIN — Medication 10 ML: at 06:59

## 2019-07-27 RX ADMIN — Medication 10 ML: at 22:10

## 2019-07-27 RX ADMIN — HYDROCODONE BITARTRATE AND ACETAMINOPHEN 1 TABLET: 5; 325 TABLET ORAL at 17:45

## 2019-07-27 NOTE — PROGRESS NOTES
Problem: Falls - Risk of  Goal: *Absence of Falls  Description  Document Jane Payment Fall Risk and appropriate interventions in the flowsheet. Outcome: Progressing Towards Goal  Note:   Fall Risk Interventions:  Mobility Interventions: Communicate number of staff needed for ambulation/transfer, OT consult for ADLs, Patient to call before getting OOB, PT Consult for mobility concerns, PT Consult for assist device competence         Medication Interventions: Evaluate medications/consider consulting pharmacy, Patient to call before getting OOB, Teach patient to arise slowly    Elimination Interventions: Call light in reach, Patient to call for help with toileting needs, Stay With Me (per policy), Toilet paper/wipes in reach, Toileting schedule/hourly rounds    History of Falls Interventions: Consult care management for discharge planning, Investigate reason for fall, Room close to nurse's station, Utilize gait belt for transfer/ambulation         Problem: Patient Education: Go to Patient Education Activity  Goal: Patient/Family Education  Outcome: Progressing Towards Goal     Problem: Pressure Injury - Risk of  Goal: *Prevention of pressure injury  Description  Document Gregorio Scale and appropriate interventions in the flowsheet.   Outcome: Progressing Towards Goal  Note:   Pressure Injury Interventions:  Sensory Interventions: Float heels, Keep linens dry and wrinkle-free, Maintain/enhance activity level, Minimize linen layers    Moisture Interventions: Assess need for specialty bed, Check for incontinence Q2 hours and as needed, Minimize layers    Activity Interventions: Increase time out of bed, Pressure redistribution bed/mattress(bed type)    Mobility Interventions: HOB 30 degrees or less, Pressure redistribution bed/mattress (bed type)         Friction and Shear Interventions: HOB 30 degrees or less, Lift sheet, Lift team/patient mobility team, Minimize layers                Problem: Patient Education: Go to Patient Education Activity  Goal: Patient/Family Education  Outcome: Progressing Towards Goal     Problem: Pain  Goal: *Control of Pain  Outcome: Progressing Towards Goal  Goal: *PALLIATIVE CARE:  Alleviation of Pain  Outcome: Progressing Towards Goal     Problem: Patient Education: Go to Patient Education Activity  Goal: Patient/Family Education  Outcome: Progressing Towards Goal

## 2019-07-27 NOTE — INTERDISCIPLINARY ROUNDS
Bedside and Verbal shift change report given to LAMONT Bettencourt (oncoming nurse) by Ramirez Mcgrath RN (offgoing nurse). Report included the following information SBAR, Kardex, Intake/Output, MAR, Accordion, Recent Results and Cardiac Rhythm SB/NSR.

## 2019-07-27 NOTE — PROGRESS NOTES
Problem: Falls - Risk of  Goal: *Absence of Falls  Description  Document Sharad Jimenez Fall Risk and appropriate interventions in the flowsheet. Outcome: Progressing Towards Goal  Note:   Fall Risk Interventions:  Mobility Interventions: Communicate number of staff needed for ambulation/transfer, OT consult for ADLs, Patient to call before getting OOB, PT Consult for mobility concerns, PT Consult for assist device competence         Medication Interventions: Evaluate medications/consider consulting pharmacy, Patient to call before getting OOB, Teach patient to arise slowly    Elimination Interventions: Call light in reach, Patient to call for help with toileting needs, Stay With Me (per policy), Toilet paper/wipes in reach, Toileting schedule/hourly rounds    History of Falls Interventions: Consult care management for discharge planning, Investigate reason for fall, Room close to nurse's station, Utilize gait belt for transfer/ambulation         Problem: Patient Education: Go to Patient Education Activity  Goal: Patient/Family Education  Outcome: Progressing Towards Goal     Problem: Pressure Injury - Risk of  Goal: *Prevention of pressure injury  Description  Document Gregorio Scale and appropriate interventions in the flowsheet.   Outcome: Progressing Towards Goal  Note:   Pressure Injury Interventions:  Sensory Interventions: Float heels, Keep linens dry and wrinkle-free, Maintain/enhance activity level, Minimize linen layers    Moisture Interventions: Assess need for specialty bed, Check for incontinence Q2 hours and as needed, Minimize layers    Activity Interventions: Increase time out of bed, Pressure redistribution bed/mattress(bed type)    Mobility Interventions: HOB 30 degrees or less, Pressure redistribution bed/mattress (bed type)         Friction and Shear Interventions: HOB 30 degrees or less, Lift sheet, Lift team/patient mobility team, Minimize layers                Problem: Pain  Goal: *Control of Pain  Outcome: Progressing Towards Goal

## 2019-07-27 NOTE — PROGRESS NOTES
Problem: Falls - Risk of  Goal: *Absence of Falls  Description  Document Madonna Chavez Fall Risk and appropriate interventions in the flowsheet.   Outcome: Progressing Towards Goal  Note:   Fall Risk Interventions:  Mobility Interventions: Assess mobility with egress test, OT consult for ADLs, Patient to call before getting OOB, PT Consult for mobility concerns, PT Consult for assist device competence         Medication Interventions: Assess postural VS orthostatic hypotension, Patient to call before getting OOB, Teach patient to arise slowly    Elimination Interventions: Bed/chair exit alarm, Call light in reach, Patient to call for help with toileting needs, Stay With Me (per policy), Toileting schedule/hourly rounds    History of Falls Interventions: Door open when patient unattended         Problem: Pain  Goal: *Control of Pain  Outcome: Progressing Towards Goal     Problem: TIA/CVA Stroke: 0-24 hours  Goal: *Hemodynamically stable  Outcome: Progressing Towards Goal  Goal: *Neurologically stable  Description  Absence of additional neurological deficits    Outcome: Progressing Towards Goal  Goal: *Absence of Signs of Aspiration on Current Diet  Outcome: Progressing Towards Goal  Goal: *Absence of deep venous thrombosis signs and symptoms(Stroke Metric)  Outcome: Progressing Towards Goal  Goal: *Ability to perform ADLs and demonstrates progressive mobility and function  Outcome: Progressing Towards Goal

## 2019-07-28 PROCEDURE — 65660000000 HC RM CCU STEPDOWN

## 2019-07-28 PROCEDURE — 74011250637 HC RX REV CODE- 250/637

## 2019-07-28 PROCEDURE — 74011250637 HC RX REV CODE- 250/637: Performed by: SURGERY

## 2019-07-28 RX ADMIN — HYDRALAZINE HYDROCHLORIDE 100 MG: 50 TABLET, FILM COATED ORAL at 06:28

## 2019-07-28 RX ADMIN — DULOXETINE HYDROCHLORIDE 120 MG: 60 CAPSULE, DELAYED RELEASE ORAL at 08:43

## 2019-07-28 RX ADMIN — Medication 10 ML: at 22:00

## 2019-07-28 RX ADMIN — PANTOPRAZOLE SODIUM 40 MG: 40 TABLET, DELAYED RELEASE ORAL at 06:30

## 2019-07-28 RX ADMIN — ARIPIPRAZOLE 10 MG: 10 TABLET ORAL at 08:37

## 2019-07-28 RX ADMIN — HYDROCODONE BITARTRATE AND ACETAMINOPHEN 1 TABLET: 5; 325 TABLET ORAL at 23:14

## 2019-07-28 RX ADMIN — PRIMIDONE 100 MG: 50 TABLET ORAL at 08:39

## 2019-07-28 RX ADMIN — CARVEDILOL 25 MG: 12.5 TABLET, FILM COATED ORAL at 08:40

## 2019-07-28 RX ADMIN — ASPIRIN 81 MG 81 MG: 81 TABLET ORAL at 08:39

## 2019-07-28 RX ADMIN — HYDRALAZINE HYDROCHLORIDE 100 MG: 50 TABLET, FILM COATED ORAL at 22:58

## 2019-07-28 RX ADMIN — HYDROXYZINE HYDROCHLORIDE 50 MG: 25 TABLET, FILM COATED ORAL at 16:32

## 2019-07-28 RX ADMIN — SENNOSIDES AND DOCUSATE SODIUM 1 TABLET: 8.6; 5 TABLET ORAL at 22:58

## 2019-07-28 RX ADMIN — CARVEDILOL 25 MG: 12.5 TABLET, FILM COATED ORAL at 16:29

## 2019-07-28 RX ADMIN — DIAZEPAM 2 MG: 2 TABLET ORAL at 08:43

## 2019-07-28 RX ADMIN — Medication 10 ML: at 06:06

## 2019-07-28 RX ADMIN — ZOLPIDEM TARTRATE 5 MG: 5 TABLET ORAL at 00:12

## 2019-07-28 RX ADMIN — PRIMIDONE 100 MG: 50 TABLET ORAL at 18:14

## 2019-07-28 RX ADMIN — DIAZEPAM 2 MG: 2 TABLET ORAL at 22:58

## 2019-07-28 RX ADMIN — HYDROCODONE BITARTRATE AND ACETAMINOPHEN 1 TABLET: 5; 325 TABLET ORAL at 16:24

## 2019-07-28 RX ADMIN — HYDROCODONE BITARTRATE AND ACETAMINOPHEN 1 TABLET: 5; 325 TABLET ORAL at 08:42

## 2019-07-28 RX ADMIN — DIAZEPAM 2 MG: 2 TABLET ORAL at 16:31

## 2019-07-28 RX ADMIN — HYDROXYZINE HYDROCHLORIDE 50 MG: 25 TABLET, FILM COATED ORAL at 22:58

## 2019-07-28 RX ADMIN — VITAM B12 100 MCG: 100 TAB at 08:43

## 2019-07-28 RX ADMIN — ATORVASTATIN CALCIUM 80 MG: 40 TABLET, FILM COATED ORAL at 08:44

## 2019-07-28 RX ADMIN — HYDROXYZINE HYDROCHLORIDE 50 MG: 25 TABLET, FILM COATED ORAL at 08:38

## 2019-07-28 RX ADMIN — Medication 10 ML: at 13:32

## 2019-07-28 RX ADMIN — HYDRALAZINE HYDROCHLORIDE 100 MG: 50 TABLET, FILM COATED ORAL at 16:26

## 2019-07-28 NOTE — PROGRESS NOTES
Problem: Falls - Risk of  Goal: *Absence of Falls  Description  Document Liana Yang Fall Risk and appropriate interventions in the flowsheet. Outcome: Progressing Towards Goal  Note:   Fall Risk Interventions:  Mobility Interventions: Assess mobility with egress test, OT consult for ADLs, Patient to call before getting OOB, PT Consult for mobility concerns, PT Consult for assist device competence         Medication Interventions: Assess postural VS orthostatic hypotension, Patient to call before getting OOB, Teach patient to arise slowly    Elimination Interventions: Bed/chair exit alarm, Call light in reach, Patient to call for help with toileting needs, Stay With Me (per policy), Toileting schedule/hourly rounds    History of Falls Interventions: Door open when patient unattended         Problem: Patient Education: Go to Patient Education Activity  Goal: Patient/Family Education  Outcome: Progressing Towards Goal     Problem: Pressure Injury - Risk of  Goal: *Prevention of pressure injury  Description  Document Gregorio Scale and appropriate interventions in the flowsheet. Outcome: Progressing Towards Goal  Note:   Pressure Injury Interventions:  Sensory Interventions: Assess changes in LOC, Float heels    Moisture Interventions: Absorbent underpads    Activity Interventions: PT/OT evaluation    Mobility Interventions: Assess need for specialty bed, Pressure redistribution bed/mattress (bed type), PT/OT evaluation    Nutrition Interventions: Document food/fluid/supplement intake, Discuss nutritional consult with provider, Offer support with meals,snacks and hydration    Friction and Shear Interventions: Lift sheet                Problem: Pressure Injury - Risk of  Goal: *Prevention of pressure injury  Description  Document Gregorio Scale and appropriate interventions in the flowsheet.   Outcome: Progressing Towards Goal  Note:   Pressure Injury Interventions:  Sensory Interventions: Assess changes in LOC, Float heels    Moisture Interventions: Absorbent underpads    Activity Interventions: PT/OT evaluation    Mobility Interventions: Assess need for specialty bed, Pressure redistribution bed/mattress (bed type), PT/OT evaluation    Nutrition Interventions: Document food/fluid/supplement intake, Discuss nutritional consult with provider, Offer support with meals,snacks and hydration    Friction and Shear Interventions: Lift sheet                Problem: Patient Education: Go to Patient Education Activity  Goal: Patient/Family Education  Outcome: Progressing Towards Goal     Problem: Pain  Goal: *Control of Pain  Outcome: Progressing Towards Goal  Goal: *PALLIATIVE CARE:  Alleviation of Pain  Outcome: Progressing Towards Goal     Problem: Patient Education: Go to Patient Education Activity  Goal: Patient/Family Education  Outcome: Progressing Towards Goal     Problem: Patient Education: Go to Patient Education Activity  Goal: Patient/Family Education  Outcome: Progressing Towards Goal     Problem: TIA/CVA Stroke: 0-24 hours  Goal: Off Pathway (Use only if patient is Off Pathway)  Outcome: Progressing Towards Goal  Goal: Activity/Safety  Outcome: Progressing Towards Goal  Goal: Consults, if ordered  Outcome: Progressing Towards Goal  Goal: Diagnostic Test/Procedures  Outcome: Progressing Towards Goal  Goal: Nutrition/Diet  Outcome: Progressing Towards Goal  Goal: Discharge Planning  Outcome: Progressing Towards Goal  Goal: Medications  Outcome: Progressing Towards Goal  Goal: Respiratory  Outcome: Progressing Towards Goal  Goal: Treatments/Interventions/Procedures  Outcome: Progressing Towards Goal  Goal: Minimize risk of bleeding post-thrombolytic infusion  Outcome: Progressing Towards Goal  Goal: Monitor for complications post-thrombolytic infusion  Outcome: Progressing Towards Goal  Goal: Psychosocial  Outcome: Progressing Towards Goal  Goal: *Hemodynamically stable  Outcome: Progressing Towards Goal  Goal: *Neurologically stable  Description  Absence of additional neurological deficits    Outcome: Progressing Towards Goal  Goal: *Verbalizes anxiety and depression are reduced or absent  Outcome: Progressing Towards Goal  Goal: *Absence of Signs of Aspiration on Current Diet  Outcome: Progressing Towards Goal  Goal: *Absence of deep venous thrombosis signs and symptoms(Stroke Metric)  Outcome: Progressing Towards Goal  Goal: *Ability to perform ADLs and demonstrates progressive mobility and function  Outcome: Progressing Towards Goal  Goal: *Stroke education started(Stroke Metric)  Outcome: Progressing Towards Goal  Goal: *Dysphagia screen performed(Stroke Metric)  Outcome: Progressing Towards Goal  Goal: *Rehab consulted(Stroke Metric)  Outcome: Progressing Towards Goal     Problem: TIA/CVA Stroke: Day 2 Until Discharge  Goal: Off Pathway (Use only if patient is Off Pathway)  Outcome: Progressing Towards Goal  Goal: Activity/Safety  Outcome: Progressing Towards Goal  Goal: Diagnostic Test/Procedures  Outcome: Progressing Towards Goal  Goal: Nutrition/Diet  Outcome: Progressing Towards Goal  Goal: Discharge Planning  Outcome: Progressing Towards Goal  Goal: Medications  Outcome: Progressing Towards Goal  Goal: Respiratory  Outcome: Progressing Towards Goal  Goal: Treatments/Interventions/Procedures  Outcome: Progressing Towards Goal  Goal: Psychosocial  Outcome: Progressing Towards Goal  Goal: *Verbalizes anxiety and depression are reduced or absent  Outcome: Progressing Towards Goal  Goal: *Absence of aspiration  Outcome: Progressing Towards Goal  Goal: *Absence of deep venous thrombosis signs and symptoms(Stroke Metric)  Outcome: Progressing Towards Goal  Goal: *Optimal pain control at patient's stated goal  Outcome: Progressing Towards Goal  Goal: *Tolerating diet  Outcome: Progressing Towards Goal  Goal: *Ability to perform ADLs and demonstrates progressive mobility and function  Outcome: Progressing Towards Goal  Goal: *Stroke education continued(Stroke Metric)  Outcome: Progressing Towards Goal

## 2019-07-28 NOTE — PROGRESS NOTES
Blood pressures were elevated this AM. Her blood pressure was taken three times and recorded as follows:  161/73, 187/48, and 185/57. The 0900 dose of hydralazine 100 mg was administered at 0628 instead of waiting until 0900 based upon nursing judgment. AM nurses Elise RN and Abbey RN were made aware of early administration time.

## 2019-07-28 NOTE — PROGRESS NOTES
Problem: Hypertension  Goal: *Blood pressure within specified parameters  Outcome: Progressing Towards Goal  Goal: *Fluid volume balance  Outcome: Progressing Towards Goal       Blood pressures to remain within the MD parameters for NIH Stroke protocol and as ordered by primary physician.

## 2019-07-28 NOTE — PROGRESS NOTES
Problem: Falls - Risk of  Goal: *Absence of Falls  Description  Document Sharad Jimenez Fall Risk and appropriate interventions in the flowsheet.   Outcome: Progressing Towards Goal  Note:   Fall Risk Interventions:  Mobility Interventions: Patient to call before getting OOB         Medication Interventions: Assess postural VS orthostatic hypotension, Evaluate medications/consider consulting pharmacy, Patient to call before getting OOB, Teach patient to arise slowly    Elimination Interventions: Call light in reach, Patient to call for help with toileting needs, Stay With Me (per policy), Toileting schedule/hourly rounds    History of Falls Interventions: Door open when patient unattended         Problem: Pain  Goal: *Control of Pain  Outcome: Progressing Towards Goal     Problem: TIA/CVA Stroke: 0-24 hours  Goal: Nutrition/Diet  Outcome: Progressing Towards Goal  Goal: Medications  Outcome: Progressing Towards Goal  Goal: Respiratory  Outcome: Progressing Towards Goal  Goal: *Hemodynamically stable  Outcome: Progressing Towards Goal  Goal: *Neurologically stable  Description  Absence of additional neurological deficits    Outcome: Progressing Towards Goal  Goal: *Absence of Signs of Aspiration on Current Diet  Outcome: Progressing Towards Goal  Goal: *Absence of deep venous thrombosis signs and symptoms(Stroke Metric)  Outcome: Progressing Towards Goal  Goal: *Stroke education started(Stroke Metric)  Outcome: Progressing Towards Goal

## 2019-07-28 NOTE — PROGRESS NOTES
Bedside shift change report given to Rut (oncoming nurse) by Errol Dale and Sweta Dillon (offgoing nurse). Report included the following information SBAR, Kardex and MAR.

## 2019-07-29 PROCEDURE — 97161 PT EVAL LOW COMPLEX 20 MIN: CPT

## 2019-07-29 PROCEDURE — 74011250637 HC RX REV CODE- 250/637: Performed by: SURGERY

## 2019-07-29 PROCEDURE — 97116 GAIT TRAINING THERAPY: CPT

## 2019-07-29 PROCEDURE — 65660000000 HC RM CCU STEPDOWN

## 2019-07-29 PROCEDURE — 97165 OT EVAL LOW COMPLEX 30 MIN: CPT

## 2019-07-29 PROCEDURE — 97530 THERAPEUTIC ACTIVITIES: CPT

## 2019-07-29 PROCEDURE — 74011250637 HC RX REV CODE- 250/637

## 2019-07-29 RX ORDER — OXYCODONE HYDROCHLORIDE 5 MG/1
5 TABLET ORAL
Status: DISCONTINUED | OUTPATIENT
Start: 2019-07-29 | End: 2019-07-31 | Stop reason: HOSPADM

## 2019-07-29 RX ADMIN — HYDROCODONE BITARTRATE AND ACETAMINOPHEN 1 TABLET: 5; 325 TABLET ORAL at 07:34

## 2019-07-29 RX ADMIN — PANTOPRAZOLE SODIUM 40 MG: 40 TABLET, DELAYED RELEASE ORAL at 06:58

## 2019-07-29 RX ADMIN — Medication 10 ML: at 16:50

## 2019-07-29 RX ADMIN — ASPIRIN 81 MG 81 MG: 81 TABLET ORAL at 08:58

## 2019-07-29 RX ADMIN — HYDROXYZINE HYDROCHLORIDE 50 MG: 25 TABLET, FILM COATED ORAL at 16:49

## 2019-07-29 RX ADMIN — DULOXETINE HYDROCHLORIDE 120 MG: 60 CAPSULE, DELAYED RELEASE ORAL at 08:57

## 2019-07-29 RX ADMIN — Medication 10 ML: at 06:59

## 2019-07-29 RX ADMIN — HYDRALAZINE HYDROCHLORIDE 100 MG: 50 TABLET, FILM COATED ORAL at 21:11

## 2019-07-29 RX ADMIN — ZOLPIDEM TARTRATE 5 MG: 5 TABLET ORAL at 22:33

## 2019-07-29 RX ADMIN — HYDROCODONE BITARTRATE AND ACETAMINOPHEN 1 TABLET: 5; 325 TABLET ORAL at 19:28

## 2019-07-29 RX ADMIN — ARIPIPRAZOLE 10 MG: 10 TABLET ORAL at 09:05

## 2019-07-29 RX ADMIN — VITAM B12 100 MCG: 100 TAB at 08:58

## 2019-07-29 RX ADMIN — HYDROXYZINE HYDROCHLORIDE 50 MG: 25 TABLET, FILM COATED ORAL at 21:12

## 2019-07-29 RX ADMIN — PRIMIDONE 100 MG: 50 TABLET ORAL at 19:23

## 2019-07-29 RX ADMIN — PRIMIDONE 100 MG: 50 TABLET ORAL at 08:57

## 2019-07-29 RX ADMIN — SENNOSIDES AND DOCUSATE SODIUM 1 TABLET: 8.6; 5 TABLET ORAL at 21:12

## 2019-07-29 RX ADMIN — DIAZEPAM 2 MG: 2 TABLET ORAL at 16:49

## 2019-07-29 RX ADMIN — DIAZEPAM 2 MG: 2 TABLET ORAL at 08:57

## 2019-07-29 RX ADMIN — ATORVASTATIN CALCIUM 80 MG: 40 TABLET, FILM COATED ORAL at 08:57

## 2019-07-29 RX ADMIN — HYDRALAZINE HYDROCHLORIDE 100 MG: 50 TABLET, FILM COATED ORAL at 08:59

## 2019-07-29 RX ADMIN — DIAZEPAM 2 MG: 2 TABLET ORAL at 21:11

## 2019-07-29 RX ADMIN — Medication 10 ML: at 22:00

## 2019-07-29 RX ADMIN — CARVEDILOL 25 MG: 12.5 TABLET, FILM COATED ORAL at 08:58

## 2019-07-29 RX ADMIN — HYDROXYZINE HYDROCHLORIDE 50 MG: 25 TABLET, FILM COATED ORAL at 08:58

## 2019-07-29 RX ADMIN — HYDROCODONE BITARTRATE AND ACETAMINOPHEN 1 TABLET: 5; 325 TABLET ORAL at 11:44

## 2019-07-29 RX ADMIN — ZOLPIDEM TARTRATE 5 MG: 5 TABLET ORAL at 00:50

## 2019-07-29 RX ADMIN — HYDRALAZINE HYDROCHLORIDE 100 MG: 50 TABLET, FILM COATED ORAL at 16:50

## 2019-07-29 NOTE — PROGRESS NOTES
Problem: Falls - Risk of  Goal: *Absence of Falls  Description  Document Yumikashiflisa Guaman Fall Risk and appropriate interventions in the flowsheet. Outcome: Progressing Towards Goal  Note:   Fall Risk Interventions:  Mobility Interventions: Patient to call before getting OOB         Medication Interventions: Assess postural VS orthostatic hypotension, Evaluate medications/consider consulting pharmacy, Patient to call before getting OOB, Teach patient to arise slowly    Elimination Interventions: Call light in reach, Patient to call for help with toileting needs, Stay With Me (per policy), Toileting schedule/hourly rounds    History of Falls Interventions: Door open when patient unattended         Problem: Pressure Injury - Risk of  Goal: *Prevention of pressure injury  Description  Document Gregorio Scale and appropriate interventions in the flowsheet.   Outcome: Progressing Towards Goal  Note:   Pressure Injury Interventions:  Sensory Interventions: Assess changes in LOC    Moisture Interventions: Absorbent underpads    Activity Interventions: Assess need for specialty bed, Pressure redistribution bed/mattress(bed type), PT/OT evaluation    Mobility Interventions: Assess need for specialty bed, PT/OT evaluation, Pressure redistribution bed/mattress (bed type)    Nutrition Interventions: Document food/fluid/supplement intake    Friction and Shear Interventions: Lift sheet                Problem: Pain  Goal: *Control of Pain  Outcome: Progressing Towards Goal     Problem: TIA/CVA Stroke: Day 2 Until Discharge  Goal: Activity/Safety  Outcome: Progressing Towards Goal  Goal: Diagnostic Test/Procedures  Outcome: Progressing Towards Goal  Goal: Nutrition/Diet  Outcome: Progressing Towards Goal  Goal: Medications  Outcome: Progressing Towards Goal  Goal: Respiratory  Outcome: Progressing Towards Goal  Goal: Treatments/Interventions/Procedures  Outcome: Progressing Towards Goal  Goal: *Absence of aspiration  Outcome: Progressing Towards Goal     Problem: Hypertension  Goal: *Blood pressure within specified parameters  Outcome: Progressing Towards Goal  Goal: *Fluid volume balance  Outcome: Progressing Towards Goal  Goal: *Labs within defined limits  Outcome: Progressing Towards Goal

## 2019-07-29 NOTE — PROGRESS NOTES
Spiritual Care Partner Volunteer visited patient in Rm 653 on 7/29/19. Documented by:   Chaplain Lovelace MDiv, MACE  [phone]

## 2019-07-29 NOTE — PROGRESS NOTES
Vascular  VSS  Neck flat  Neuro intact  She is \"afraid\" to go home by herself in light of her weak legs and wishes to go to rehab for additional strengthening and ambulation  No issues from surgery

## 2019-07-29 NOTE — PROGRESS NOTES
Problem: Falls - Risk of  Goal: *Absence of Falls  Description  Document Kayla Corral Fall Risk and appropriate interventions in the flowsheet.   Outcome: Progressing Towards Goal  Note:   Fall Risk Interventions:  Mobility Interventions: Patient to call before getting OOB         Medication Interventions: Assess postural VS orthostatic hypotension, Evaluate medications/consider consulting pharmacy, Patient to call before getting OOB, Teach patient to arise slowly    Elimination Interventions: Call light in reach, Patient to call for help with toileting needs, Stay With Me (per policy), Toileting schedule/hourly rounds    History of Falls Interventions: Door open when patient unattended         Problem: Pain  Goal: *Control of Pain  Outcome: Progressing Towards Goal  Goal: *PALLIATIVE CARE:  Alleviation of Pain  Outcome: Progressing Towards Goal     Problem: Patient Education: Go to Patient Education Activity  Goal: Patient/Family Education  Outcome: Progressing Towards Goal     Problem: TIA/CVA Stroke: 0-24 hours  Goal: Off Pathway (Use only if patient is Off Pathway)  Outcome: Progressing Towards Goal  Goal: Activity/Safety  Outcome: Progressing Towards Goal  Goal: Consults, if ordered  Outcome: Progressing Towards Goal  Goal: Diagnostic Test/Procedures  Outcome: Progressing Towards Goal  Goal: Nutrition/Diet  Outcome: Progressing Towards Goal  Goal: Discharge Planning  Outcome: Progressing Towards Goal  Goal: Medications  Outcome: Progressing Towards Goal  Goal: Respiratory  Outcome: Progressing Towards Goal  Goal: Treatments/Interventions/Procedures  Outcome: Progressing Towards Goal  Goal: Minimize risk of bleeding post-thrombolytic infusion  Outcome: Progressing Towards Goal  Goal: Monitor for complications post-thrombolytic infusion  Outcome: Progressing Towards Goal  Goal: Psychosocial  Outcome: Progressing Towards Goal  Goal: *Hemodynamically stable  Outcome: Progressing Towards Goal  Goal: *Neurologically stable  Description  Absence of additional neurological deficits    Outcome: Progressing Towards Goal  Goal: *Verbalizes anxiety and depression are reduced or absent  Outcome: Progressing Towards Goal  Goal: *Absence of Signs of Aspiration on Current Diet  Outcome: Progressing Towards Goal  Goal: *Absence of deep venous thrombosis signs and symptoms(Stroke Metric)  Outcome: Progressing Towards Goal  Goal: *Ability to perform ADLs and demonstrates progressive mobility and function  Outcome: Progressing Towards Goal  Goal: *Stroke education started(Stroke Metric)  Outcome: Progressing Towards Goal  Goal: *Dysphagia screen performed(Stroke Metric)  Outcome: Progressing Towards Goal  Goal: *Rehab consulted(Stroke Metric)  Outcome: Progressing Towards Goal     Problem: Hypertension  Goal: *Blood pressure within specified parameters  Outcome: Progressing Towards Goal  Goal: *Fluid volume balance  Outcome: Progressing Towards Goal  Goal: *Labs within defined limits  Outcome: Progressing Towards Goal

## 2019-07-29 NOTE — PROGRESS NOTES
The following information was submitted to [facility] [employer] for initial authorization:  Face Sheet/Demographics    (Include: Usual living situation)  History and Physical  Last 24 hours of MD and RN notes   (Include: Current Level of Functioning; cognitive status)  PT/OT/ST Evaluations and/or notes within the last 48 hours  MAR  MD orders  (Include: Attending or ordering MDs name and phone #; skilled nursing needs; Wound care/etc)  Projected Date of Transfer to SNF  Requested SNF  SNF Point of Contact and Phone #  CM Point of Contact and Phone #  NPI # for SNF    Still awaiting for PT and OT evaluations to be completed in order to submit this to [facility].     LADI Bloom

## 2019-07-29 NOTE — PROGRESS NOTES
PT and OT are completed and CM faxed to [facility]. They have now received all information needed to make a SNF determination.       Determination could be made by end of the day, but more likely Tuesday since it is now Makenna [phone], MSW

## 2019-07-29 NOTE — PROGRESS NOTES
Problem: Self Care Deficits Care Plan (Adult)  Goal: *Acute Goals and Plan of Care (Insert Text)  Description    Occupational Therapy Goals  Initiated 7/29/2019  1. Patient will perform grooming standing at sink with supervision/set-up within 7 day(s). 2.  Patient will perform lower body dressing with supervision/set-up within 7 day(s). 3.  Patient will perform bathing with supervision/set-up within 7 day(s). 4.  Patient will perform toilet transfers with supervision/set-up within 7 day(s). 5.  Patient will perform all aspects of toileting with supervision/set-up within 7 day(s). 6.  Patient will participate in upper extremity therapeutic exercise/activities with supervision/set-up for 10 minutes within 7 day(s). 7.  Patient will utilize energy conservation techniques during functional activities with verbal cues within 7 day(s). Outcome: Progressing Towards Goal     OCCUPATIONAL THERAPY EVALUATION  Patient: Angely Ruano (43 y.o. female)  Date: 7/29/2019  Primary Diagnosis: Carotid artery stenosis, symptomatic, left [I65.22]  Procedure(s) (LRB):  LEFT CAROTID ARTERY ENDARTERECTOMY (Left) 3 Days Post-Op   Precautions: fall       ASSESSMENT :  Based on the objective data described below, patient presents with Supervision to Contact guard assistance upper body ADLs, Contact guard assistance lower body ADLs, and Contact guard assistance functional mobility. The following are barriers to ADL independence while in acute care:   - Cognitive and/or behavioral: safety awareness  - Medical condition: ROM, strength, functional reach and functional endurance      Prior level of function: Patient was admitted from [facility] where she was receiving assistance with ADLs and mobility PRN. Prior to previous admission, patient was living alone at an [facility], mod-I with ADLs with SPC or walker.   Endorses a history of falls, including a fall in December resulting in L shoulder dislocation and since L shoulder flexion/ abduction has been limited to ~30 degrees. PLAN :  Recommendations and Planned Interventions: self care training, functional mobility training, therapeutic exercise, balance training, therapeutic activities, endurance activities, patient education, home safety training and family training/education    Frequency/Duration: Patient will be followed by occupational therapy 3 times a week to address goals. Patient will benefit from skilled acute intervention to address the above impairments. Patients rehabilitation potential is considered to be Good    Discharge recommendations: Rehab at SNF. Patient expressed strong desire to return to [place]. Barriers to discharging home, in addition to above listed impairments: lives alone  history of falls. SUBJECTIVE:   Patient stated I need to go back to [place].     OBJECTIVE DATA SUMMARY:   HISTORY:   Past Medical History:   Diagnosis Date    Anxiety disorder     Atrial fibrillation (HCC)     CAD (coronary artery disease) 2007    stents, CABG x 3v    Chronic kidney disease     Depression     AND CHRONIC ANXIETY    Diabetes (HCC)     GERD (gastroesophageal reflux disease)     High cholesterol     History of peptic ulcer     Bleeding ulcer with increased NSAID use    Hypertension     MI, old 2007    PUD (peptic ulcer disease)     Stroke ([facility] [address]     Tremor     Valvular heart disease      Past Surgical History:   Procedure Laterality Date    CARDIAC SURG PROCEDURE UNLIST      CABG X3 VESSEL    HX CORONARY ARTERY BYPASS GRAFT      HX ORTHOPAEDIC Right     HX TONSILLECTOMY       Expanded or extensive additional review of patient history:     Home Situation  Home Environment: Independent living  One/Two Story Residence: Other (Comment)  Living Alone: Yes  Support Systems: Friends \ neighbors  Patient Expects to be Discharged to[de-identified] Rehabilitation facility  Current DME Used/Available at Home: karen Richardson Walker    Hand dominance: Right    EXAMINATION OF PERFORMANCE DEFICITS:  Cognitive/Behavioral Status:  Neurologic State: Alert  Orientation Level: Oriented X4  Cognition: Follows commands; Appropriate for age attention/concentration             Skin: visible skin appears intact    Edema: none noted    Hearing: Auditory  Auditory Impairment: None    Vision/Perceptual:                           Acuity: Within Defined Limits         Range of Motion:    AROM: Generally decreased, functional  PROM: Within functional limits              LUE AROM  L Shoulder Flexion: 30  L Shoulder ABduction: 30       Strength:    Strength: Generally decreased, functional                Coordination:  Coordination: Within functional limits  Fine Motor Skills-Upper: Left Intact; Right Intact    Gross Motor Skills-Upper: Left Intact; Right Intact    Tone & Sensation:    Tone: Normal  Sensation: Impaired(reported inconsistent \"prickling\" in feet)                      Balance:  Sitting: Intact  Standing: Intact; With support    Functional Mobility and Transfers for ADLs:  Bed Mobility:  Supine to Sit: Supervision  Sit to Supine: Supervision    Transfers:  Sit to Stand: Stand-by assistance  Stand to Sit: Stand-by assistance    ADL Assessment:  Feeding: Independent(inferred)    Oral Facial Hygiene/Grooming: Contact guard assistance(inferred)    Bathing: Contact guard assistance(inferred)    Upper Body Dressing: Contact guard assistance(inferred)    Lower Body Dressing: Contact guard assistance(inferred, demonstrated tailor sit with each LE)    Toileting: Contact guard assistance(inferred)                    Functional Measure:  Barthel Index:    Bathin  Bladder: 10  Bowels: 10  Groomin  Dressin  Feeding: 10  Mobility: 10  Stairs: 5  Toilet Use: 10  Transfer (Bed to Chair and Back): 15  Total: 80/100        Percentage of impairment   0%   1-19%   20-39%   40-59%   60-79%   80-99%   100%   Barthel Score 0-100 100 99-80 79-60 59-40 20-39 1-19   0     The Barthel ADL Index: Guidelines  1. The index should be used as a record of what a patient does, not as a record of what a patient could do. 2. The main aim is to establish degree of independence from any help, physical or verbal, however minor and for whatever reason. 3. The need for supervision renders the patient not independent. 4. A patient's performance should be established using the best available evidence. Asking the patient, friends/relatives and nurses are the usual sources, but direct observation and common sense are also important. However direct testing is not needed. 5. Usually the patient's performance over the preceding 24-48 hours is important, but occasionally longer periods will be relevant. 6. Middle categories imply that the patient supplies over 50 per cent of the effort. 7. Use of aids to be independent is allowed. Denise Rouse., Barthel, D.W. ([id number]). Functional evaluation: the Barthel Index. [address] [facility] (14)2. [facility] isaiah FANTA Mccurdy, Brook Quigley., Palma Breen., [place], [address] [place] [address] (1999). Measuring the change indisability after inpatient rehabilitation; comparison of the responsiveness of the Barthel Index and Functional [place] Measure. Journal of Neurology, Neurosurgery, and Psychiatry, 66(4), 838-107. Heather Villagomez, N.J.A, APARNA Ventura, & Nancy Gallardo MRennyA. (2004.) Assessment of post-stroke quality of life in cost-effectiveness studies: The usefulness of the Barthel Index and the EuroQoL-5D. Quality of Life Research, 15, 88-35         Occupational Therapy Evaluation Charge Determination   History Examination Decision-Making   LOW Complexity : Brief history review  MEDIUM Complexity : 3-5 performance deficits relating to physical, cognitive , or psychosocial skils that result in activity limitations and / or participation restrictions MEDIUM Complexity : Patient may present with comorbidities that affect occupational performnce.  Miniml to moderate modification of tasks or assistance (eg, physical or verbal ) with assesment(s) is necessary to enable patient to complete evaluation       Based on the above components, the patient evaluation is determined to be of the following complexity level: LOW   Pain:  Patient reports mild pain at surgical site with activity    Activity Tolerance:   Sitting post activity: /46, HR 64    After treatment patient left:   Sitting up in chair  RN notified  Call bell left within reach     COMMUNICATION/EDUCATION:   The patients plan of care was discussed with: Physical Therapist and Registered Nurse. Home safety education was provided and the patient/caregiver indicated understanding., Patient/family have participated as able in goal setting and plan of care. and Patient/family agree to work toward stated goals and plan of care. This patients plan of care is appropriate for delegation to BEL.     Thank you for this referral.  Klaus Lennon OT  Time Calculation: 31 mins

## 2019-07-29 NOTE — PROGRESS NOTES
Problem: Mobility Impaired (Adult and Pediatric)  Goal: *Acute Goals and Plan of Care (Insert Text)  Description  Physical Therapy Goals  Initiated 7/29/2019  1. Patient will move from supine to sit and sit to supine , scoot up and down and roll side to side in bed with independence within 7 day(s). 2.  Patient will transfer from bed to chair and chair to bed with modified independence using the least restrictive device within 7 day(s). 3.  Patient will perform sit to stand with modified independence within 7 day(s). 4.  Patient will ambulate with modified independence for 150 feet with the least restrictive device within 7 day(s). Outcome: Progressing Towards Goal  PHYSICAL THERAPY EVALUATION  Patient: Karyn Celestin (10 y.o. female)  Date: 7/29/2019  Primary Diagnosis: Carotid artery stenosis, symptomatic, left [I65.22]  Procedure(s) (LRB):  LEFT CAROTID ARTERY ENDARTERECTOMY (Left) 3 Days Post-Op   Precautions: SBP <150       ASSESSMENT :  Based on the objective data described below, the patient presents with supervision level bed mobility, SBA sit<>stand transfers, and CGA ambulation using RW s/p left carotid artery endarterectomy POD 3. Patient lives in an [address] [place] [address] Road at baseline but has been at [address] to admission. She ambulates mod I using SPC or walker at baseline and is independent with ADLs. This date, patient presented with generalized weakness (4/5 strength BLEs) and decreased AROM, however she appears at her baseline function. Although patient presents at baseline and reports being up ad june in the room, she strongly prefers SNF upon discharge rather than returning to [address] [place] [address]. Patient will benefit from skilled intervention to address the above impairments. Patients rehabilitation potential is considered to be Fair  Factors which may influence rehabilitation potential include:   ? None noted  ? Mental ability/status  ? Medical condition  ? Home/family situation and support systems  ? Safety awareness  ? Pain tolerance/management  ? Other:      PLAN :  Recommendations and Planned Interventions:  ?           Bed Mobility Training             ? Neuromuscular Re-Education  ? Transfer Training                   ? Orthotic/Prosthetic Training  ? Gait Training                         ? Modalities  ? Therapeutic Exercises           ? Edema Management/Control  ? Therapeutic Activities            ? Patient and Family Training/Education  ? Other (comment):    Frequency/Duration: Patient will be followed by physical therapy  3 times a week to address goals. Discharge Recommendations: Patient preferring SNF upon d/c rather than returning to [address] [place] [address]. Further Equipment Recommendations for Discharge: None      SUBJECTIVE:   Patient stated I need to go to rehab to get better at walking.     OBJECTIVE DATA SUMMARY:   HISTORY:    Past Medical History:   Diagnosis Date    Anxiety disorder     Atrial fibrillation (HCC)     CAD (coronary artery disease) 2007    stents, CABG x 3v    Chronic kidney disease     Depression     AND CHRONIC ANXIETY    Diabetes (HCC)     GERD (gastroesophageal reflux disease)     High cholesterol     History of peptic ulcer     Bleeding ulcer with increased NSAID use    Hypertension     MI, old 2007    PUD (peptic ulcer disease)     Stroke ([address]     Tremor     Valvular heart disease      Past Surgical History:   Procedure Laterality Date    CARDIAC SURG PROCEDURE UNLIST      CABG X3 VESSEL    HX CORONARY ARTERY BYPASS GRAFT      HX ORTHOPAEDIC Right     HX TONSILLECTOMY       Prior Level of Function/Home Situation: Mod I using SPC or walker at [facility]. Has been at [facility].     Personal factors and/or comorbidities impacting plan of care: lives alone    Home Situation  Home Environment: Independent living  [address] [place] [address] St: Other (Comment)  Living Alone: Yes  Support Systems: Friends \ neighbors  Patient Expects to be Discharged to[de-identified] Rehabilitation facility  Current DME Used/Available at Home: [address], [place], straight, Walker    EXAMINATION/PRESENTATION/DECISION MAKING:   Critical Behavior:  Neurologic State: Alert  Orientation Level: Oriented X4  Cognition: Follows commands, Appropriate for age attention/concentration     Hearing: Auditory  Auditory Impairment: None  Skin:    Edema:   Range Of Motion:  AROM: Generally decreased, functional           PROM: Within functional limits           Strength:    Strength: Generally decreased, functional                    Tone & Sensation:   Tone: Normal              Sensation: Impaired(reported inconsistent \"prickling\" in feet)               Coordination:  Coordination: Within functional limits  Vision:      Functional Mobility:  Bed Mobility:     Supine to Sit: Supervision  Sit to Supine: Supervision     Transfers:  Sit to Stand: Stand-by assistance  Stand to Sit: Stand-by assistance                       Balance:   Sitting: Intact  Standing: Intact; With support  Ambulation/Gait Training:  Distance (ft): 150 Feet (ft)  Assistive Device: Gait belt;Walker, rolling  Ambulation - Level of Assistance: Contact guard assistance        Gait Abnormalities: Decreased step clearance;Shuffling gait(excess knee flexion LLE)        Base of Support: Narrowed     Speed/Samira: Shuffled; Slow  Step Length: Left shortened;Right shortened             Functional Measure:  Barthel Index:    Bathin  Bladder: 10  Bowels: 10  Groomin  Dressin  Feeding: 10  Mobility: 10  Stairs: 5  Toilet Use: 10  Transfer (Bed to Chair and Back): 15  Total: 80/100       Percentage of impairment   0%   1-19%   20-39%   40-59%   60-79%   80-99%   100%   Barthel Score 0-100 100 99-80 79-60 59-40 20-39 1-19   0     The Barthel ADL Index: Guidelines  1.  The index should be used as a record of what a patient does, not as a record of what a patient could do.  2. The main aim is to establish degree of independence from any help, physical or verbal, however minor and for whatever reason. 3. The need for supervision renders the patient not independent. 4. A patient's performance should be established using the best available evidence. Asking the patient, friends/relatives and nurses are the usual sources, but direct observation and common sense are also important. However direct testing is not needed. 5. Usually the patient's performance over the preceding 24-48 hours is important, but occasionally longer periods will be relevant. 6. Middle categories imply that the patient supplies over 50 per cent of the effort. 7. Use of aids to be independent is allowed. Mikhail Ramírez., Barthel, D.W. (9613). Functional evaluation: the Barthel Index. [facility] (14)2. Kwame Andrade isaiah FANTA Mccurdy, Chucho Carreno., [facility], [address] [place] [address] (1999). Measuring the change indisability after inpatient rehabilitation; comparison of the responsiveness of the Barthel Index and Functional Aldie Measure. Journal of Neurology, Neurosurgery, and Psychiatry, 66(4), 052-707. Tim Armendariz, N.J.A, INDIRA Ventura.ANGELICA, & Patrizia Cornell MSELENA. (2004.) Assessment of post-stroke quality of life in cost-effectiveness studies: The usefulness of the Barthel Index and the EuroQoL-5D.  Quality of Life Research, 15, 052-25            Physical Therapy Evaluation Charge Determination   History Examination Presentation Decision-Making   MEDIUM  Complexity : 1-2 comorbidities / personal factors will impact the outcome/ POC  HIGH Complexity : 4+ Standardized tests and measures addressing body structure, function, activity limitation and / or participation in recreation  LOW Complexity : Stable, uncomplicated  LOW Complexity : FOTO score of       Based on the above components, the patient evaluation is determined to be of the following complexity level: LOW     Pain:  Pain Scale 1: Numeric (0 - 10)  Pain Intensity 1: 8  Pain Location 1: Neck  Pain Orientation 1: Left  Pain Description 1: Aching  Pain Intervention(s) 1: Medication (see MAR)  Activity Tolerance:   VSS   Please refer to the flowsheet for vital signs taken during this treatment. After treatment: Attempted to place chair alarm however patient refused and has been up ad june. Discussed with nursing. ? Patient left in no apparent distress sitting up in chair  ? Patient left in no apparent distress in bed  ? Call bell left within reach  ? Nursing notified  ? Caregiver present  ? Bed alarm activated    COMMUNICATION/EDUCATION:   The patients plan of care was discussed with: Occupational Therapist and Registered Nurse. ?         Fall prevention education was provided and the patient/caregiver indicated understanding. ? Patient/family have participated as able in goal setting and plan of care. ?         Patient/family agree to work toward stated goals and plan of care. ?         Patient understands intent and goals of therapy, but is neutral about his/her participation. ? Patient is unable to participate in goal setting and plan of care.     Thank you for this referral.  Katie Peguero, PT, DPT   Time Calculation: 22 mins

## 2019-07-29 NOTE — PROGRESS NOTES
Reason for Admission:   Planned admit for carotid endarterectomy. Was currently at [facility] for rehab. RRAT Score:     26             Resources/supports as identified by patient/family:   Retired and insured with [employer]. She lives alone but reports being independent with all ADLs prior to admission. Top Challenges facing patient (as identified by patient/family and CM): Finances/Medication cost?     None                Transportation? May need transportation at discharge. Support system or lack thereof? Has several cousins and friends                     Living arrangements? See above           Self-care/ADLs/Cognition? See above alert and oriented x3          Current Advanced Directive/Advance Care Plan:  Copy on chart                          Plan for utilizing home health:   Anticipate that she will discharge to  [facility] when medically stable                 Transition of Care Plan:     1. Referral sent to [facility], patient will need authorization. CM will follow and assist with disposition.     Care Management Interventions  PCP Verified by CM: Yes(Dr Carissa Barrera)  Palliative Care Criteria Met (RRAT>21 & CHF Dx)?: No  Mode of Transport at Discharge: (Private car vs BLS)  Transition of Care Consult (CM Consult): SNF(Patient was at [facility] prior to this planned admit)  Partner SNF: Yes  Physical Therapy Consult: Yes  Occupational Therapy Consult: Yes  Current Support Network: Own Home, Lives Alone  Confirm Follow Up Transport: Self  Plan discussed with Pt/Family/Caregiver: Yes   Resource Information Provided?: No  Discharge Location  Discharge Placement: Skilled nursing facility    Gregoria Ardon RN CRM  Ext [phone]

## 2019-07-30 PROCEDURE — 74011250637 HC RX REV CODE- 250/637: Performed by: SURGERY

## 2019-07-30 PROCEDURE — 74011250637 HC RX REV CODE- 250/637

## 2019-07-30 PROCEDURE — 65660000000 HC RM CCU STEPDOWN

## 2019-07-30 RX ORDER — HYDROCODONE BITARTRATE AND ACETAMINOPHEN 5; 325 MG/1; MG/1
1 TABLET ORAL
Qty: 12 TAB | Refills: 0 | Status: SHIPPED | OUTPATIENT
Start: 2019-07-30 | End: 2019-08-02

## 2019-07-30 RX ADMIN — Medication 10 ML: at 16:37

## 2019-07-30 RX ADMIN — VITAM B12 100 MCG: 100 TAB at 08:52

## 2019-07-30 RX ADMIN — HYDROCODONE BITARTRATE AND ACETAMINOPHEN 1 TABLET: 5; 325 TABLET ORAL at 16:37

## 2019-07-30 RX ADMIN — Medication 10 ML: at 06:42

## 2019-07-30 RX ADMIN — SENNOSIDES AND DOCUSATE SODIUM 1 TABLET: 8.6; 5 TABLET ORAL at 21:46

## 2019-07-30 RX ADMIN — ARIPIPRAZOLE 10 MG: 10 TABLET ORAL at 08:52

## 2019-07-30 RX ADMIN — ZOLPIDEM TARTRATE 5 MG: 5 TABLET ORAL at 21:46

## 2019-07-30 RX ADMIN — PANTOPRAZOLE SODIUM 40 MG: 40 TABLET, DELAYED RELEASE ORAL at 06:42

## 2019-07-30 RX ADMIN — HYDROXYZINE HYDROCHLORIDE 50 MG: 25 TABLET, FILM COATED ORAL at 21:46

## 2019-07-30 RX ADMIN — Medication 10 ML: at 21:46

## 2019-07-30 RX ADMIN — CARVEDILOL 25 MG: 12.5 TABLET, FILM COATED ORAL at 18:07

## 2019-07-30 RX ADMIN — HYDRALAZINE HYDROCHLORIDE 100 MG: 50 TABLET, FILM COATED ORAL at 06:48

## 2019-07-30 RX ADMIN — HYDRALAZINE HYDROCHLORIDE 100 MG: 50 TABLET, FILM COATED ORAL at 21:46

## 2019-07-30 RX ADMIN — HYDROXYZINE HYDROCHLORIDE 50 MG: 25 TABLET, FILM COATED ORAL at 16:37

## 2019-07-30 RX ADMIN — HYDROCODONE BITARTRATE AND ACETAMINOPHEN 1 TABLET: 5; 325 TABLET ORAL at 06:48

## 2019-07-30 RX ADMIN — PRIMIDONE 100 MG: 50 TABLET ORAL at 18:06

## 2019-07-30 RX ADMIN — ATORVASTATIN CALCIUM 80 MG: 40 TABLET, FILM COATED ORAL at 10:49

## 2019-07-30 RX ADMIN — PRIMIDONE 100 MG: 50 TABLET ORAL at 08:51

## 2019-07-30 RX ADMIN — CARVEDILOL 25 MG: 12.5 TABLET, FILM COATED ORAL at 08:55

## 2019-07-30 RX ADMIN — DIAZEPAM 2 MG: 2 TABLET ORAL at 08:52

## 2019-07-30 RX ADMIN — DULOXETINE HYDROCHLORIDE 120 MG: 60 CAPSULE, DELAYED RELEASE ORAL at 08:52

## 2019-07-30 RX ADMIN — HYDRALAZINE HYDROCHLORIDE 100 MG: 50 TABLET, FILM COATED ORAL at 16:37

## 2019-07-30 RX ADMIN — DIAZEPAM 2 MG: 2 TABLET ORAL at 18:06

## 2019-07-30 RX ADMIN — HYDROCODONE BITARTRATE AND ACETAMINOPHEN 1 TABLET: 5; 325 TABLET ORAL at 21:46

## 2019-07-30 RX ADMIN — DIAZEPAM 2 MG: 2 TABLET ORAL at 21:46

## 2019-07-30 RX ADMIN — ASPIRIN 81 MG 81 MG: 81 TABLET ORAL at 08:52

## 2019-07-30 RX ADMIN — HYDROXYZINE HYDROCHLORIDE 50 MG: 25 TABLET, FILM COATED ORAL at 08:52

## 2019-07-30 RX ADMIN — HYDROCODONE BITARTRATE AND ACETAMINOPHEN 1 TABLET: 5; 325 TABLET ORAL at 10:49

## 2019-07-30 NOTE — PROGRESS NOTES
Problem: Falls - Risk of  Goal: *Absence of Falls  Description  Document Nelia Gallardo Fall Risk and appropriate interventions in the flowsheet.   Outcome: Progressing Towards Goal  Note:   Fall Risk Interventions:  Mobility Interventions: Assess mobility with egress test, Communicate number of staff needed for ambulation/transfer, OT consult for ADLs, Patient to call before getting OOB, PT Consult for mobility concerns, PT Consult for assist device competence, Strengthening exercises (ROM-active/passive), Utilize walker, cane, or other assistive device, Utilize gait belt for transfers/ambulation         Medication Interventions: Assess postural VS orthostatic hypotension, Evaluate medications/consider consulting pharmacy, Patient to call before getting OOB, Teach patient to arise slowly, Utilize gait belt for transfers/ambulation    Elimination Interventions: Call light in reach, Patient to call for help with toileting needs, Stay With Me (per policy), Toilet paper/wipes in reach, Toileting schedule/hourly rounds    History of Falls Interventions: Consult care management for discharge planning, Evaluate medications/consider consulting pharmacy, Investigate reason for fall, Room close to nurse's station, Utilize gait belt for transfer/ambulation, Assess for delayed presentation/identification of injury for 48 hrs (comment for end date), Vital signs minimum Q4HRs X 24 hrs (comment for end date)         Problem: Pain  Goal: *Control of Pain  Outcome: Progressing Towards Goal     Problem: TIA/CVA Stroke: Day 2 Until Discharge  Goal: Activity/Safety  Outcome: Progressing Towards Goal  Goal: Diagnostic Test/Procedures  Outcome: Progressing Towards Goal  Goal: Nutrition/Diet  Outcome: Progressing Towards Goal  Goal: Discharge Planning  Outcome: Progressing Towards Goal  Goal: Medications  Outcome: Progressing Towards Goal  Goal: Respiratory  Outcome: Progressing Towards Goal  Goal: Treatments/Interventions/Procedures  Outcome: Progressing Towards Goal  Goal: *Tolerating diet  Outcome: Progressing Towards Goal     Problem: Hypertension  Goal: *Blood pressure within specified parameters  Outcome: Progressing Towards Goal  Goal: *Fluid volume balance  Outcome: Progressing Towards Goal

## 2019-07-30 NOTE — PROGRESS NOTES
Spoke with [facility] phone [phone] and sent additional clinical.  Sent explanation of hospitalization including h & p and discharge summary from admission 7/11-7/18. Pt returned for planned readmission/vascular surgery from SNF on 7/26/19. There is not a new H & P. Also faxed AVS (discharge instructions including med list) to fax [phone]. Outcome pending with SNF auth, discharge is planned for today.     LADI Viera

## 2019-07-30 NOTE — PROGRESS NOTES
Vascular  VSS  Neuro intact  To SNF today  Resume usual meds  Will give a few lortabs for discharge  Return to my office in 2 weeks

## 2019-07-30 NOTE — PROGRESS NOTES
Spoke with patient at bedside regarding disposition. Encouraged her to consider a different plan should [employer] deny authorization for SNF stay. She does not want to consider that at this time. Will follow up on authorization later this afternoon.   Chelly Villa RN CRM  Ext [phone]

## 2019-07-30 NOTE — PROGRESS NOTES
Bedside and Verbal shift change report given to Mario Browne (oncoming nurse) by Berhane Pruett (offgoing nurse). Report included the following information SBAR, Kardex, Intake/Output, MAR, Recent Results and Cardiac Rhythm NSR.

## 2019-07-30 NOTE — PROGRESS NOTES
Bedside and Verbal shift change report given to LAMONT Rinaldi (oncoming nurse) by Mirna Correia (offgoing nurse). Report included the following information SBAR, Kardex, Intake/Output, MAR, Recent Results and Cardiac Rhythm NSR.

## 2019-07-30 NOTE — PROGRESS NOTES
This CM spoke with Daniel Campuzano in Dr Ashley Mayo office and left information he will need for the P2P with [facility]. Quinn Newton RN CRM  Ext [phone]    Received call from Dr Raulito Vizcaino and he will call for P2P. If unsuccessful I have discussed going home with PT/OT.  Will follow up in am.

## 2019-07-30 NOTE — DISCHARGE INSTRUCTIONS
Patient Education        Carotid Endarterectomy: What to Expect at Home  Your Recovery  A carotid endarterectomy (say \"jose-RAW-adal coxul-vju-dxj-BELEN-carmen-isabella\") is surgery to remove fatty build-up (plaque) from one of the carotid arteries. There are two carotid arteries--one on each side of the neck--that supply blood to the brain. When plaque builds up in either artery, it can make it hard for blood to flow to the brain. This surgery may lower your risk of having a stroke. You may have a sore throat for a few days. You can expect the cut (incision) in your neck to be sore for about a week. The area around the incision may also be swollen and bruised at first. The area in front of the incision may be numb. This usually gets better after 6 to 12 months. Your doctor closed the incision in your neck with stitches. The stitches will be removed 7 to 10 days after surgery, or you may have stitches that dissolve on their own. You may feel more tired than usual for several weeks after surgery. You will probably be able to go back to work or your usual activities in 1 to 2 weeks. This care sheet gives you a general idea about how long it will take for you to recover. But each person recovers at a different pace. Follow the steps below to feel better as quickly as possible. How can you care for yourself at home? Activity    · Rest when you feel tired. Getting enough sleep will help you recover.     · Try to walk each day. Start by walking a little more than you did the day before. Bit by bit, increase the amount you walk. Walking boosts blood flow and helps prevent pneumonia and constipation.     · Avoid strenuous activities, such as bicycle riding, jogging, weight lifting, or aerobic exercise. Your doctor will tell you when it's okay to do strenuous activity.     · For 1 to 2 weeks, avoid lifting anything that would make you strain.  This may include a child, heavy grocery bags and milk containers, a heavy briefcase or backpack, cat litter or dog food bags, or a vacuum .     · Ask your doctor when you can drive again.     · You will probably need to take 1 to 2 weeks off from work. It depends on the type of work you do and how you feel.     · You may shower and take baths as usual. But do not soak the incision for the first 2 weeks, or until your doctor tells you it is okay. Pat the incision dry.     · Your doctor will tell you when you can have sex again. Diet    · You can eat your normal diet. If your stomach is upset, try bland, low-fat foods like plain rice, broiled chicken, toast, and yogurt.     · Drink plenty of fluids (unless your doctor tells you not to).     · You may notice that your bowel movements are not regular right after your surgery. This is common. Try to avoid constipation and straining with bowel movements. You may want to take a fiber supplement every day. If you have not had a bowel movement after a couple of days, ask your doctor about taking a mild laxative. Medicines    · Your doctor will tell you if and when you can restart your medicines. He or she will also give you instructions about taking any new medicines.     · If you take blood thinners, such as warfarin (Coumadin), clopidogrel (Plavix), or aspirin, be sure to talk to your doctor. He or she will tell you if and when to start taking those medicines again. Make sure that you understand exactly what your doctor wants you to do.     · Your doctor may advise you to take aspirin when you go home. This helps prevent blood clots. Take your medicines exactly as prescribed. Call your doctor if you think you are having a problem with your medicine.     · Be safe with medicines. Take pain medicines exactly as directed. ? If the doctor gave you a prescription medicine for pain, take it as prescribed. ? If you are not taking a prescription pain medicine, ask your doctor if you can take an over-the-counter medicine.   ? Do not take two or more pain medicines at the same time unless the doctor told you to. Many pain medicines have acetaminophen, which is Tylenol. Too much acetaminophen (Tylenol) can be harmful.     · If you think your pain medicine is making you sick to your stomach:  ? Take your medicine after meals (unless your doctor has told you not to). ? Ask your doctor for a different pain medicine.     · If your doctor prescribed antibiotics, take them as directed. Do not stop taking them just because you feel better. You need to take the full course of antibiotics.     · If you take a blood thinner, such as aspirin, be sure you get instructions about how to take your medicine safely. Blood thinners can cause serious bleeding problems. Incision care    · If you have strips of tape over your incision, leave the tape on for a week or until it falls off.     · Wash the area daily with water and pat it dry. Other cleaning products, such as hydrogen peroxide, can make the wound heal more slowly. You may cover the area with a gauze bandage if it weeps or rubs against clothing. Change the bandage every day.     · Keep the area clean and dry. Follow-up care is a key part of your treatment and safety. Be sure to make and go to all appointments, and call your doctor if you are having problems. It's also a good idea to know your test results and keep a list of the medicines you take. When should you call for help? Call 911 anytime you think you may need emergency care. For example, call if:    · You passed out (lost consciousness).     · You have severe trouble breathing.     · You have a tight bulge in your neck on the side where the surgery was done.     · You have symptoms of a stroke. These may include:  ? Sudden numbness, tingling, weakness, or loss of movement in your face, arm, or leg, especially on only one side of your body. ? Sudden vision changes. ? Sudden trouble speaking.   ? Sudden confusion or trouble understanding simple statements. ? Sudden problems with walking or balance. ? A sudden, severe headache that is different from past headaches.     · You have chest pain or pressure. This may occur with:  ? Sweating. ? Shortness of breath. ? Nausea or vomiting. ? Pain that spreads from the chest to the neck, jaw, or one or both shoulders or arms. ? Dizziness or lightheadedness. ? A fast or uneven pulse. After calling 911, chew 1 adult-strength or 2 to 4 low-dose aspirin. Wait for an ambulance. Do not try to drive yourself.    Call your doctor now or seek immediate medical care if:    · You have pain that does not get better after you take pain medicine.     · You have loose stitches, or your incision comes open.     · Bright red blood has soaked through the bandage over your incision.     · You have signs of infection, such as:  ? Increased pain, swelling, warmth, or redness. ? Red streaks leading from the incision. ? Pus draining from the incision. ? A fever.    Watch closely for any changes in your health, and be sure to contact your doctor if you have any problems. Where can you learn more? Go to http://suraj-delaney.info/. Enter N380 in the search box to learn more about \"Carotid Endarterectomy: What to Expect at Home. \"  Current as of: July 22, 2018  Content Version: 12.1  © 8900-4649 Healthwise, [employer]orporated. Care instructions adapted under license by [employer] (which disclaims liability or warranty for this information). If you have questions about a medical condition or this instruction, always ask your healthcare professional. [place] [address] any warranty or liability for your use of this information.

## 2019-07-30 NOTE — PROGRESS NOTES
Medical director for [facility] has requested a P2P with attending physician in order to authorize pt's return to SNF. This must be done by noon on 2019 and attending physician to call [phone] and have pt's name and  when calling. If their medical director is not available at the time of the call, then MD will leave his return call number. This must be done by noon on 2019 to satisfy requirement of insurance P2P. Unit CM to communicate with attending.      LADI Bosch

## 2019-07-30 NOTE — PROGRESS NOTES
Ingrid Gains phone [phone] to check status of SNF auth. This authorization is still pending but no additional clinicals needed at this time.       LADI Sharma

## 2019-07-31 VITALS
SYSTOLIC BLOOD PRESSURE: 155 MMHG | HEART RATE: 62 BPM | DIASTOLIC BLOOD PRESSURE: 42 MMHG | BODY MASS INDEX: 29.02 KG/M2 | WEIGHT: 174.2 LBS | RESPIRATION RATE: 23 BRPM | HEIGHT: 65 IN | OXYGEN SATURATION: 98 % | TEMPERATURE: 98.1 F

## 2019-07-31 PROCEDURE — 74011250637 HC RX REV CODE- 250/637

## 2019-07-31 PROCEDURE — 74011250637 HC RX REV CODE- 250/637: Performed by: SURGERY

## 2019-07-31 RX ADMIN — ARIPIPRAZOLE 10 MG: 10 TABLET ORAL at 09:00

## 2019-07-31 RX ADMIN — DIAZEPAM 2 MG: 2 TABLET ORAL at 08:46

## 2019-07-31 RX ADMIN — HYDROCODONE BITARTRATE AND ACETAMINOPHEN 1 TABLET: 5; 325 TABLET ORAL at 08:46

## 2019-07-31 RX ADMIN — CARVEDILOL 25 MG: 12.5 TABLET, FILM COATED ORAL at 08:48

## 2019-07-31 RX ADMIN — ASPIRIN 81 MG 81 MG: 81 TABLET ORAL at 08:46

## 2019-07-31 RX ADMIN — HYDRALAZINE HYDROCHLORIDE 100 MG: 50 TABLET, FILM COATED ORAL at 08:48

## 2019-07-31 RX ADMIN — PANTOPRAZOLE SODIUM 40 MG: 40 TABLET, DELAYED RELEASE ORAL at 07:09

## 2019-07-31 RX ADMIN — DULOXETINE HYDROCHLORIDE 120 MG: 60 CAPSULE, DELAYED RELEASE ORAL at 08:46

## 2019-07-31 RX ADMIN — ATORVASTATIN CALCIUM 80 MG: 40 TABLET, FILM COATED ORAL at 08:46

## 2019-07-31 RX ADMIN — Medication 10 ML: at 07:09

## 2019-07-31 RX ADMIN — VITAM B12 100 MCG: 100 TAB at 08:46

## 2019-07-31 RX ADMIN — HYDROXYZINE HYDROCHLORIDE 50 MG: 25 TABLET, FILM COATED ORAL at 08:46

## 2019-07-31 RX ADMIN — PRIMIDONE 100 MG: 50 TABLET ORAL at 08:46

## 2019-07-31 NOTE — PROGRESS NOTES
4:27 PM  CM received a call from Medical Director with [address] indicating Authorization for SNF was denied. Requested a fax number to submit documentation. CM provided [phone]. Awaiting documentation and will provide to .     LADI Juan/DANAE  Care Management

## 2019-07-31 NOTE — PROGRESS NOTES
Vascular  Patient has been refused rehab or SNF by medicare because she is too highly functioning. Plan discharge home with home PT/OT and visiting home nursing.

## 2019-07-31 NOTE — PROGRESS NOTES
Discharge noted along with referral for [place]. Referral sent to [facility] for SN/PT/OT. Transportation arranged with [facility]. Requested 1200  time. They will also take patient to pharmacy if necessary and also take her to the grocery store. Awaiting confirmation of arrangements. Lloyd Martinez RN CRM  Ext [phone]    Arrangements for discharge completed. [facility] will pick patinet up in the discharge area. Patient aware and in agreement. Nursing aware of plan. Received call from Stacie Salas [phone] that she is the  from [facility]  1788 and in the discharge area. Nursing aware to transport patient down.

## 2019-07-31 NOTE — PROGRESS NOTES
Problem: Falls - Risk of  Goal: *Absence of Falls  Description  Document Liana Soria Fall Risk and appropriate interventions in the flowsheet. Outcome: Progressing Towards Goal  Note:   Fall Risk Interventions:  Mobility Interventions: OT consult for ADLs, Patient to call before getting OOB         Medication Interventions: Evaluate medications/consider consulting pharmacy, Patient to call before getting OOB, Teach patient to arise slowly    Elimination Interventions: Call light in reach    History of Falls Interventions: Evaluate medications/consider consulting pharmacy         Problem: Pressure Injury - Risk of  Goal: *Prevention of pressure injury  Description  Document Gregorio Scale and appropriate interventions in the flowsheet.   Outcome: Progressing Towards Goal  Note:   Pressure Injury Interventions:  Sensory Interventions: Assess changes in LOC, Discuss PT/OT consult with provider, Float heels, Keep linens dry and wrinkle-free, Maintain/enhance activity level, Minimize linen layers, Monitor skin under medical devices, Pad between skin to skin    Moisture Interventions: Absorbent underpads, Minimize layers    Activity Interventions: PT/OT evaluation    Mobility Interventions: Assess need for specialty bed, Float heels, PT/OT evaluation    Nutrition Interventions: Document food/fluid/supplement intake    Friction and Shear Interventions: Lift sheet                Problem: Pain  Goal: *Control of Pain  Outcome: Progressing Towards Goal     Problem: TIA/CVA Stroke: Day 2 Until Discharge  Goal: Activity/Safety  Outcome: Progressing Towards Goal  Goal: Diagnostic Test/Procedures  Outcome: Progressing Towards Goal  Goal: Nutrition/Diet  Outcome: Progressing Towards Goal  Goal: Discharge Planning  Outcome: Progressing Towards Goal  Goal: Medications  Outcome: Progressing Towards Goal  Goal: Respiratory  Outcome: Progressing Towards Goal  Goal: Treatments/Interventions/Procedures  Outcome: Progressing Towards Goal  Goal: *Absence of aspiration  Outcome: Progressing Towards Goal  Goal: *Tolerating diet  Outcome: Progressing Towards Goal     Problem: Hypertension  Goal: *Blood pressure within specified parameters  Outcome: Progressing Towards Goal  Goal: *Labs within defined limits  Outcome: Progressing Towards Goal

## 2019-07-31 NOTE — DISCHARGE SUMMARY
[facility] SUMMARY    Name:  Vivek Tellez  MR#:  [id number]  :  1940  ACCOUNT #:  [de-identified]  ADMIT DATE:  2019  DISCHARGE DATE:  2019      ADMITTING DIAGNOSIS:  Critical symptomatic left carotid stenosis. PROCEDURE:  Left carotid endarterectomy with Dacron patch. HOSPITAL COURSE:  A 81-year-old woman who was seen in the hospital two weeks ago. She had an acute left basal ganglia stroke. She has been unsteady on her feet. Because of her acute infarct and critical left carotid stenosis, she was discharged home on an aspirin and Plavix for another week following a week on admission. The day of surgery, she underwent left carotid endarterectomy with patch. Postoperatively, she was neurologically intact, but on the Saturday morning, she stated that she did not want to go home because she was by herself, so we have been waiting to get a skilled nursing facility which came in today. Her wound had some mild bruising from her aspirin and Plavix. She is neurologically intact. She was discharged home on a few hydrocodone pills with Tylenol in addition to her usual medications. She can stop her Plavix and remain on the baby aspirin. She can shower. We have given her additional instructions regarding activity and hygiene. She will see us in ten days.         MD DANIELLE Dorantes/RANDY_ALECIAG_I/BC_ISACC  D:  2019 6:49  T:  2019 18:24  JOB #:  [id number]  CC:  Jana Reno DO

## 2019-08-01 NOTE — OP NOTES
[facility]  OPERATIVE REPORT    Name:  Radha Paredes  MR#:  [id number]  :  1940  ACCOUNT #:  [de-identified]  DATE OF SERVICE:  2019      PREOPERATIVE DIAGNOSIS:  Critical symptomatic left internal carotid artery stenosis. POSTOPERATIVE DIAGNOSIS:  Critical symptomatic left internal carotid artery stenosis. PROCEDURE PERFORMED:  Left carotid endarterectomy with primary closure. SURGEON:  Cristofer Goddard MD    ASSISTANTS:  Claudetta Pong and Kadeem Lee. ANESTHESIA:  General endotracheal.    ANESTHESIOLOGIST:  Rajeev Silva MD    COMPLICATIONS:  None. SPECIMENS REMOVED:  Carotid bifurcation plaque. IMPLANTS:  Hemashield Dacron patch. ESTIMATED BLOOD LOSS:  100 mL. DRAINS:  Small Andrews-Cardenas. INDICATIONS:  Briefly, 61-year-old woman, who I saw almost 3 weeks ago. She presented with weak legs and acute basal ganglia infarct on the left. She was noted to have critical left carotid stenosis. The patient was placed on aspirin and Plavix and discharged home after her initial short stay into rehab and then brought back a week later making 2 weeks from her event for her surgery. She is aware of the potential risks and complications including stroke, death, and cranial nerve injury. PROCEDURE:  At surgery, under general endotracheal anesthesia, she was prepped and draped. A thorough time-out was done. Following this, an incision was made parallel and medial to the sternocleidomastoid through the skin and platysma. Crossing facial veins were ligated between silk ties. Common, internal, and external carotids were dissected free. The bulb at distal cervical common carotid was severely calcified as was the internal carotid artery for a distance of 4 cm. Control was obtained above and below and also at the external.  The patient was heparinized with 7500 units of heparin.   Three minutes later, flow was interrupted with difficulty and arteriotomy was made through the calcific plaque of the internal and common, 10 shunt was placed first into the common and then into the internal.  Shunt patency was confirmed with Doppler. Standard endarterectomy of this rather large vessel was performed with all floating debris removed. The vessel was quite large at least 7 mm from the internal and 12 mm from the common. It was opted to not place a patch but to sew this primarily, this was accomplished with 5-0 Prolene from below and above with 2 additional throws made, the shunt was removed. There was good backbleeding from the internal.  The artery was copiously flushed and closure completed. Flow was restored to the external carotid artery initially and then to the brain. The Doppler signals were normal.  Hemostasis was obtained. The wounds were evaluated after giving 30 mg protamine sulfate and Fibrillar. Small incisions were made inferiorly and Andrews-Cardenas drain introduced. Deep structures were then closed with 3-0 Vicryl suture after announcement of the final count being correct on 2 occasions. The drain was then placed and then the platysma was closed with running 3-0 Vicryl, the skin was closed with subcuticular Monocryl. The patient then had Dermabond applied. She tolerated the procedure well and was taken to the recovery room in satisfactory condition with normal neurological status.         Hetal Ramos MD      FS/[email or web address]/BS_EDIT  D:  07/31/2019 14:58  T:  08/01/2019 0:05  JOB #:  [id number]  CC:  MD Andre Richards DO

## 2019-08-13 ENCOUNTER — OFFICE VISIT (OUTPATIENT)
Dept: CARDIOLOGY CLINIC | Age: 79
End: 2019-08-13

## 2019-08-13 VITALS
DIASTOLIC BLOOD PRESSURE: 80 MMHG | RESPIRATION RATE: 16 BRPM | BODY MASS INDEX: 26.09 KG/M2 | SYSTOLIC BLOOD PRESSURE: 122 MMHG | OXYGEN SATURATION: 97 % | WEIGHT: 156.6 LBS | HEART RATE: 70 BPM | HEIGHT: 65 IN

## 2019-08-13 DIAGNOSIS — E11.9 TYPE 2 DIABETES MELLITUS WITHOUT COMPLICATION, WITHOUT LONG-TERM CURRENT USE OF INSULIN (HCC): ICD-10-CM

## 2019-08-13 DIAGNOSIS — Z95.1 S/P CABG (CORONARY ARTERY BYPASS GRAFT): ICD-10-CM

## 2019-08-13 DIAGNOSIS — Z95.5 S/P CORONARY ARTERY STENT PLACEMENT: ICD-10-CM

## 2019-08-13 DIAGNOSIS — I65.22 CAROTID ARTERY STENOSIS, SYMPTOMATIC, LEFT: ICD-10-CM

## 2019-08-13 DIAGNOSIS — I25.10 CORONARY ARTERY DISEASE INVOLVING NATIVE CORONARY ARTERY OF NATIVE HEART WITHOUT ANGINA PECTORIS: ICD-10-CM

## 2019-08-13 DIAGNOSIS — S43.005S SHOULDER DISLOCATION, LEFT, SEQUELA: ICD-10-CM

## 2019-08-13 DIAGNOSIS — I10 ESSENTIAL HYPERTENSION: Primary | ICD-10-CM

## 2019-08-13 DIAGNOSIS — N18.30 CKD (CHRONIC KIDNEY DISEASE) STAGE 3, GFR 30-59 ML/MIN (HCC): ICD-10-CM

## 2019-08-13 DIAGNOSIS — I63.9 ACUTE ISCHEMIC STROKE (HCC): ICD-10-CM

## 2019-08-13 NOTE — PROGRESS NOTES
HISTORY OF PRESENT ILLNESS  Melody Suarez is a 67 y.o. female. She has coronary disease status post bypass surgery several years ago. She was admitted to the hospital within the past 4-8 weeks with left leg weakness and found to have a small stroke. She also was found to have a left carotid artery stenosis and she eventually underwent endarterectomy by Dr. Jennifer Moore. She was seen in consultation by cardiology. An echocardiogram showed normal left ventricular function. Her blood pressure was quite high and her hydralazine was increased from 25 mg twice daily to 100 mg three times a day. She is living alone. She sees Dr. Jennifer Moore next week. Her weight is down 6 pounds. She is reluctant to cut back on her blood pressure medications. HPI  Patient Active Problem List   Diagnosis Code    Hypotension I95.9    CAD (coronary artery disease) I25.10    S/P coronary artery stent placement Z95.5    Renal failure N19    Elevated troponin R74.8    Pericardial effusion I31.3    Lactic acidosis E87.2    AF (atrial fibrillation) ([facility]) I48.91    Anemia D64.9    GI bleed K92.2    Syncope R55    Bradycardia R00.1    MORENO (acute kidney injury) ([facility] N17.9    Postoperative anemia due to acute blood loss D62    S/P CABG (coronary artery bypass graft) Z95.1    Edema R60.9    Diabetes mellitus ([facility]) E11.9    CKD (chronic kidney disease), stage III ([facility]) N18.3    Fall W19. Yumiyde Hail    Acute ischemic stroke ([facility] [address] I63.9    HTN (hypertension) I10    Carotid artery stenosis, symptomatic, left I65.22     Current Outpatient Medications   Medication Sig Dispense Refill    acetaminophen (TYLENOL) 325 mg tablet Take 650 mg by mouth every six (6) hours as needed for Pain or Fever.  carvedilol (COREG) 12.5 mg tablet Take 2 Tabs by mouth two (2) times daily (with meals). (Patient taking differently: Take 25 mg by mouth two (2) times daily (with meals).  DOSAGE IS 25 MG BID) 180 Tab 3    hydrALAZINE (APRESOLINE) 100 mg tablet Take 1 Tab by mouth three (3) times daily for 30 days. (Patient taking differently: Take 100 mg by mouth three (3) times daily. Indications: high blood pressure) 90 Tab 0    atorvastatin (LIPITOR) 40 mg tablet Take 2 Tabs by mouth daily for 30 days. (Patient taking differently: Take 40 mg by mouth daily. DOSAGE IS 80 MG DAILY) 60 Tab 0    zolpidem (AMBIEN) 10 mg tablet Take 0.5 Tabs by mouth nightly as needed for Sleep for up to 30 days. Max Daily Amount: 5 mg. (Patient taking differently: Take 5 mg by mouth nightly.) 30 Tab 0    aspirin 81 mg chewable tablet Take 1 Tab by mouth daily for 30 days. 30 Tab 0    diazePAM (VALIUM) 2 mg tablet Take 2 mg by mouth three (3) times daily. Indications: CHRONIC PAIN      cyanocobalamin (VITAMIN B12) 100 mcg tablet Take 100 mcg by mouth daily.  vit C,L-Su-geirc-lutein-zeaxan (PRESERVISION AREDS-2) 130-436-31-1 mg-unit-mg-mg cap capsule Take 1 Cap by mouth two (2) times a day.  senna-docusate (SENNA WITH DOCUSATE SODIUM) 8.6-50 mg per tablet Take 1 Tab by mouth nightly.  primidone (MYSOLINE) 50 mg tablet Take 2 Tabs by mouth two (2) times a day. 120 Tab 5    hydrOXYzine HCl (ATARAX) 50 mg tablet Take 50 mg by mouth three (3) times daily. HOLD FOR LETHARGY  Indications: anxious, CHRONIC PANIC ATTACKS      ARIPiprazole (ABILIFY) 15 mg tablet Take 15 mg by mouth daily. Indications: Additional Medications to Treat Depression      methocarbamol (ROBAXIN) 750 mg tablet Take 750 mg by mouth three (3) times daily as needed. Indications: muscle spasm      Cholecalciferol, Vitamin D3, 1,000 unit cap Take 1,000 Units by mouth daily.  LACTOBACILLUS RHAMNOSUS GG (CULTURELLE PO) Take 1 Tab by mouth daily.  pantoprazole (PROTONIX) 40 mg tablet Take 40 mg by mouth Daily (before breakfast). Indications: h/o bleeding ulcer with nsaid      DULoxetine (CYMBALTA) 60 mg capsule Take 120 mg by mouth daily.  Indications: Anxiousness associated with Depression  multivitamins-minerals-lutein (CENTRUM SILVER) tab tablet Take 1 Tab by mouth daily. Past Medical History:   Diagnosis Date    Anxiety disorder     Atrial fibrillation ([facility] [address] [facility]     CAD (coronary artery disease) 2007    stents, CABG x 3v    Chronic kidney disease     Depression     AND CHRONIC ANXIETY    Diabetes ([facility] [address]     GERD (gastroesophageal reflux disease)     High cholesterol     History of peptic ulcer     Bleeding ulcer with increased NSAID use    Hypertension     MI, old 2007    PUD (peptic ulcer disease)     Stroke ([facility] [address]     Tremor     Valvular heart disease      Past Surgical History:   Procedure Laterality Date    CARDIAC SURG PROCEDURE UNLIST      CABG X3 VESSEL    HX CAROTID ENDARTERECTOMY      HX CORONARY ARTERY BYPASS GRAFT      HX ORTHOPAEDIC Right     HX TONSILLECTOMY         Review of Systems   Neurological: Positive for focal weakness. All other systems reviewed and are negative. Visit Vitals  /80 (BP 1 Location: Right arm, BP Patient Position: Sitting)   Pulse 70   Resp 16   Ht 5' 5\" (1.651 m)   Wt 156 lb 9.6 oz (71 kg)   SpO2 97%   BMI 26.06 kg/m²       Physical Exam   Constitutional: She is oriented to person, place, and time. She appears well-nourished. HENT:   Head: Atraumatic. Eyes: Conjunctivae are normal.   Neck: Neck supple. Cardiovascular: Normal rate, regular rhythm and normal heart sounds. Exam reveals no gallop and no friction rub. No murmur heard. Pulmonary/Chest: Breath sounds normal. She has no wheezes. She has no rales. Abdominal: Bowel sounds are normal.   Musculoskeletal: She exhibits no edema. Neurological: She is oriented to person, place, and time. Skin: Skin is dry. Psychiatric: Thought content normal.   Nursing note and vitals reviewed. ASSESSMENT and PLAN  Her blood pressure is 725 systolic in the office.  She is on a very high dose of hydralazine and I am concerned that her blood pressure could fall further and she could suffer a fall. I wanted to decrease the hydralazine to 50 mg twice daily but she did not want to make any changes. Therefore, I will see her back in one month and reassess her blood pressure at that time.

## 2019-08-29 RX ORDER — PRIMIDONE 50 MG/1
100 TABLET ORAL 2 TIMES DAILY
Qty: 120 TAB | Refills: 0 | Status: SHIPPED | OUTPATIENT
Start: 2019-08-29 | End: 2019-10-23 | Stop reason: SDUPTHER

## 2019-10-23 RX ORDER — PRIMIDONE 50 MG/1
100 TABLET ORAL 2 TIMES DAILY
Qty: 120 TAB | Refills: 2 | Status: SHIPPED | OUTPATIENT
Start: 2019-10-23 | End: 2019-12-12

## 2019-12-12 ENCOUNTER — OFFICE VISIT (OUTPATIENT)
Dept: NEUROLOGY | Age: 79
End: 2019-12-12

## 2019-12-12 VITALS
OXYGEN SATURATION: 97 % | DIASTOLIC BLOOD PRESSURE: 58 MMHG | SYSTOLIC BLOOD PRESSURE: 108 MMHG | HEART RATE: 71 BPM | RESPIRATION RATE: 18 BRPM | BODY MASS INDEX: 27.99 KG/M2 | HEIGHT: 65 IN | WEIGHT: 168 LBS

## 2019-12-12 DIAGNOSIS — G20 PARKINSONISM, UNSPECIFIED PARKINSONISM TYPE (HCC): Primary | ICD-10-CM

## 2019-12-12 DIAGNOSIS — R26.9 GAIT DISTURBANCE: ICD-10-CM

## 2019-12-12 RX ORDER — HYDROXYZINE PAMOATE 50 MG/1
50 CAPSULE ORAL
COMMUNITY
End: 2020-05-11

## 2019-12-12 RX ORDER — CARBIDOPA AND LEVODOPA 25; 100 MG/1; MG/1
TABLET ORAL
Qty: 180 TAB | Refills: 3 | Status: SHIPPED | OUTPATIENT
Start: 2019-12-12 | End: 2020-01-28 | Stop reason: SDUPTHER

## 2019-12-12 RX ORDER — ZOLPIDEM TARTRATE 10 MG/1
10 TABLET ORAL
COMMUNITY
End: 2020-05-11

## 2019-12-12 NOTE — LETTER
12/22/19 Patient: Darya Gotti YOB: 1940 Date of Visit: 12/12/2019 Diamond Mendoza DO 
[address] [address] [address] VIA Facsimile: [phone] Dear Diamond Mendoza DO, Thank you for referring Ms. Marlene Talley to [address] [place] [address] for evaluation. My notes for this consultation are attached. If you have questions, please do not hesitate to call me. I look forward to following your patient along with you. Sincerely, Marcos Ortega MD

## 2019-12-12 NOTE — PROGRESS NOTES
Neurology Consult Note      HISTORY PROVIDED BY: patient    Chief Complaint:   Chief Complaint   Patient presents with    Follow-up     tremors remain patient stated they are either the same or worse . noted constant tremors throughout the day    Tremors      Subjective:   Pt is a 67 y.o. right handed female initially and last seen in clinic on 11/15/18 with DM, CAD, HTN, Hyperlipidemia, and Afib, with c/o onset of tremors 3 months prior, however, in review of EMR, tremor was present at least 4 years ago. Tremor was most noticeable in her hands with activity, particularly writing, improved slightly on Primidone 50mg qhs. Additionally, pt reported feeling very unsteady when walking, feels a \"nervousness\" in her legs. Exam with right carotid bruits, decreased sensation to pinprick and vibration in bilateral feet, dysmetria with heel to shin, unable to stand with feet together and eyes opened, wide-based unsteady gait, diminished reflexes in the lower extremities, and bilateral endpoint tremors with FTN. Tremor c/w essential tremor without evidence of Parkinson's or parkinsonism on exam.  Discussed the diagnosis of essential tremor and symptomatic treatment with primidone. Recommended increasing the dose to 1 tab twice a day, however, the patient has already done this on her own without improvement in her tremor, so recommended going up to 1 tab in the morning and 2 tabs at night. Additionally, patient has findings on exam consistent with peripheral neuropathy. Peripheral neuropathy, along with musculoskeletal issues, and possibly hypotension with prior history of this mentioned in the EMR, could all be contributing to her gait instability. Patient reported that her last HgbA1c looked good. Requested recent labs from her PCP. Recommended B12/MMA and SPEP with IF to assess for other etiologies. Recommended switch to just B12 since excess vitamin B6 can cause neuropathy.   Regarding right bruits, followed by Dr. Iwona Sorenson in cardiology and had carotid dopplers ordered. She returns for delayed f/u. She call the clinic on 12/3/18 requesting an increase in Primidone dosing,  Increased from 50mg in AM and 100mg in PM to 100mg bid. She recently saw her orthopedist about her knees, Dr. Drew Silverman, and he suggested she return to her neurologist to assess for Parkinson's. She reports ongoing tremor in left hand only when sitting in her recliner looking at the TV. If she moves her hand in to another position she can make it stop. She can feel the tremor. She is still taking Primidone, but doesn't feel like it is working. When asked about gait, she reports that she doesn't have strength in her calves. Has difficulty walking. Handwriting has become messy, smaller. Has noticed an occ frog in throat, but no other change in voice. She is using a cane, has a walker she uses at home. She turns very slowly, states she is afraid of falling. In review of EMR, not mentioned by pt at time of her appt, she was admitted in July, 2019 with acute left head of the caudate lacunar infarct, noted to have abnormal signal in sarah as well, artifact vs acute lacunar infarct, CIWM changes. CTA H/N - 80% left ICA stenosis, 50% right ICA stenosis, occluded distal left vert. She was seen by Dr. Jayshree Greer, he questioned onset of early PD at that time and ordered MRI C-spine to assess for myelopathy, multilevel degenerative changes seen with severe canal stenosis at C4-C5. She was seen by Dr. Edin Dave who noted surgical intervention was contraindicated at that time given stroke and severe ICA stenosis, recommended f/u in 3 months. She was discharged on ASA and Plavix and d/c to SNF She was readmitted 7/26/19 for left CEA with Dr. Bita Iyer. Plavix was stopped at d/c.      Past Medical History:   Diagnosis Date    Anxiety disorder     Atrial fibrillation (HCC)     CAD (coronary artery disease) 2007    stents, CABG x 3v    Cervical stenosis of spinal canal 07/2019    Chronic kidney disease     CVA (cerebral vascular accident) ([facility] 07/2019    left lacunar infarct at head of caudate    Depression     AND CHRONIC ANXIETY    Diabetes (HCC)     GERD (gastroesophageal reflux disease)     High cholesterol     History of peptic ulcer     Bleeding ulcer with increased NSAID use    Hypertension     Left carotid stenosis 07/2019    s/p left CEA with Dr. Kathryn Grullno MI, old 2007    PUD (peptic ulcer disease)     Stroke ([facility]     Tremor     Valvular heart disease       Past Surgical History:   Procedure Laterality Date    CARDIAC SURG PROCEDURE UNLIST      CABG X3 VESSEL    HX CAROTID ENDARTERECTOMY      HX CORONARY ARTERY BYPASS GRAFT      HX ORTHOPAEDIC Right     HX TONSILLECTOMY        Social History     Socioeconomic History    Marital status: SINGLE     Spouse name: Not on file    Number of children: Not on file    Years of education: Not on file    Highest education level: Not on file   Occupational History    Occupation: Retired realestate/teacher   Social Needs    Financial resource strain: Not on file    Food insecurity:     Worry: Not on file     Inability: Not on file   MollyWatr needs:     Medical: Not on file     Non-medical: Not on file   Tobacco Use    Smoking status: Former Smoker     Types: Cigarettes    Smokeless tobacco: Never Used   Substance and Sexual Activity    Alcohol use:  Yes     Alcohol/week: 0.0 standard drinks     Comment: Rare    Drug use: No    Sexual activity: Not on file   Lifestyle    Physical activity:     Days per week: Not on file     Minutes per session: Not on file    Stress: Not on file   Relationships    Social connections:     Talks on phone: Not on file     Gets together: Not on file     Attends [employer] service: Not on file     Active member of club or organization: Not on file     Attends meetings of clubs or organizations: Not on file     Relationship status: Not on file   Guy Intimate partner violence:     Fear of current or ex partner: Not on file     Emotionally abused: Not on file     Physically abused: Not on file     Forced sexual activity: Not on file   Other Topics Concern    Not on file   Social History Narrative    Lives in [place] alone     Family History   Problem Relation Age of Onset    Heart Attack Mother 72        Dec 81yo    Hypertension Mother     Other Father         Unknown    Parkinson's Disease Brother          Objective:   ROS:  Per HPI o/w reviewed and neg. No Known Allergies     Meds:  Outpatient Medications Prior to Visit   Medication Sig Dispense Refill    zolpidem (AMBIEN) 10 mg tablet Take  by mouth nightly as needed for Sleep.  hydrOXYzine pamoate (VISTARIL) 50 mg capsule Take 25 mg by mouth three (3) times daily as needed for Itching.  primidone (MYSOLINE) 50 mg tablet Take 2 Tabs by mouth two (2) times a day. 120 Tab 2    carvedilol (COREG) 12.5 mg tablet Take 2 Tabs by mouth two (2) times daily (with meals). (Patient taking differently: Take 25 mg by mouth two (2) times daily (with meals). DOSAGE IS 25 MG BID) 180 Tab 3    diazePAM (VALIUM) 2 mg tablet Take 2 mg by mouth three (3) times daily. Indications: CHRONIC PAIN      cyanocobalamin (VITAMIN B12) 100 mcg tablet Take 100 mcg by mouth daily.  vit C,N-Lb-dksgp-lutein-zeaxan (PRESERVISION AREDS-2) [id number] mg-unit-mg-mg cap capsule Take 1 Cap by mouth two (2) times a day.  senna-docusate (SENNA WITH DOCUSATE SODIUM) 8.6-50 mg per tablet Take 1 Tab by mouth nightly.  hydrOXYzine HCl (ATARAX) 50 mg tablet Take 50 mg by mouth three (3) times daily. HOLD FOR LETHARGY  Indications: anxious, CHRONIC PANIC ATTACKS      ARIPiprazole (ABILIFY) 15 mg tablet Take 15 mg by mouth daily. Indications: Additional Medications to Treat Depression      methocarbamol (ROBAXIN) 750 mg tablet Take 750 mg by mouth three (3) times daily as needed.  Indications: muscle spasm      Cholecalciferol, Vitamin D3, 1,000 unit cap Take 1,000 Units by mouth daily.  pantoprazole (PROTONIX) 40 mg tablet Take 40 mg by mouth Daily (before breakfast). Indications: h/o bleeding ulcer with nsaid      DULoxetine (CYMBALTA) 60 mg capsule Take 120 mg by mouth daily. Indications: Anxiousness associated with Depression      multivitamins-minerals-lutein (CENTRUM SILVER) tab tablet Take 1 Tab by mouth daily.  acetaminophen (TYLENOL) 325 mg tablet Take 650 mg by mouth every six (6) hours as needed for Pain or Fever.  LACTOBACILLUS RHAMNOSUS GG (CULTURELLE PO) Take 1 Tab by mouth daily. No facility-administered medications prior to visit. Imaging:  MRI Results (most recent): MRI Results (most recent):  Results from [place] encounter on 07/11/19   MRI CERV SPINE WO CONT    Narrative *PRELIMINARY REPORT*    MR cervical spine demonstrates spondylosis C3-C7 with degenerative disc changes  and osteophyte/disc complex at C4-C5 resulting in moderately severe central  stenosis. At C5-C6, there is mild central stenosis. At C3-C4, there is mild  central stenosis. Preliminary report was provided by Dr. Jan Foster, the on-call radiologist, at 407    Final report to follow. *END PRELIMINARY REPORT*    *FINAL REPORT BELOW*    Indication: Cervical myelopathy    Exam: MRI cervical spine. Comparisons: 12/24/2018    Sequences: Sagittal T1, T2, and STIR. Axial T1, T2. No contrast. There is  significant patient motion artifact    FINDINGS: There is straightening of the normal cervical lordosis. There is  multilevel endplate degenerative change. An acute fracture is not noted given  motion artifact. The visualized posterior fossa and cord signal are normal.  Paraspinous soft tissues are within normal limits. Craniocervical junction: Intact. Without stenosis    C2-C3: There is a small disc bulge and facet arthropathy.  There is minimal  indentation of the ventral thecal sac without significant foraminal narrowing    C3-C4: There is a diffuse disc osteophytic bulge with uncovertebral degenerative  change and facet arthropathy. Note that the right vertebral artery is ectatic  extending into the right foramen. There is moderate narrowing of the canal with  moderate to severe right and moderate left foraminal narrowing    C4-C5: There is a diffuse disc osteophytic bulge with uncovertebral degenerative  change and facet arthropathy. Canal stenosis is severe with severe narrowing of  the foramen. C5-C6: There is a diffuse disc osteophytic bulge and uncovertebral degenerative  change. Canal stenosis is moderate with moderate narrowing of the foramen    C6-C7: There is a diffuse disc osteophytic bulge with uncovertebral degenerative  change. Canal stenosis is mild to moderate with mild to moderate narrowing of  the foramen    C7-T1: There is a diffuse disc bulge with facet degenerative change. Canal  stenosis is mild with mild to moderate narrowing of the foramen. Impression IMPRESSION:  1. Multilevel degenerative change detailed by level above most significant at  C4-5.  2. 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 3. Study is limited by motion artifact                  CT Results (most recent):     Results from Hospital Encounter encounter on 07/11/19   CTA CODE NEURO HEAD AND NECK W CONT    Narrative *PRELIMINARY REPORT*  There is age-indeterminate short segment occlusion at the distal portion of the  intracranial left vertebral artery, but the remainder of the posterior and  anterior circulation is well opacified. Preliminary report was provided by Dr. Albina Mandel, the on-call radiologist, on  7/12/2019 at 1835 hours. Final report to follow.   *END PRELIMINARY REPORT*    *FINAL REPORT BELOW*  EXAM:  CTA CODE NEURO HEAD AND NECK W CONT  INDICATION:  Patient initially presents with increasing lower extremity weakness  and difficulty walking with MRI immediately preceding code stroke CTA  demonstrating acute left lacunar infarct. TECHNIQUE:   Axial spiral acquired CT angiography was performed from the aortic arch up  through the intracranial vessels. This was performed during intravenous bolus  infusion 100 mL of Isovue 370. Post-processing with  MIP reconstructions from  aortic arch to the skull base. Standard sagittal and coronal reconstructions. CT  dose reduction was achieved through use of a standardized protocol tailored for  this examination and automatic exposure control for dose modulation. COMPARISON: MRI, CT  FINDINGS:  Prominent atherosclerotic calcification aortic arch and brachiocephalic  arteries. Minimal stenosis origin left common carotid artery. Tortuosity of the  brachiocephalic arteries. The right vertebral artery is dominant. Mild stenosis at the origin of the right  vertebral artery. The left vertebral artery origin and proximal segment and at  least moderate stenosis. There are other areas of segmental stenosis and  narrowing throughout the left vertebral artery. The right cervical vertebral  artery is better maintained throughout its cervical extent. The distal left vertebral artery V4 segment has atherosclerotic disease in the  distal small V4 segment is occluded before reaching the basilar artery. Mild  atherosclerosis without hemodynamically significant stenosis in the distal right  V4 segment. The basilar artery is adequately maintained and supplies both  posterior cerebral arteries. Mild irregularity left P1 segment. Atherosclerotic  calcification versus tiny chronic appearing left posterior cerebral artery  embolus. Common carotid arteries have diffuse coarse atherosclerotic calcification. There  is involvement of the carotid bifurcations bilaterally.   Minimum diameter stenosis of the proximal right internal carotid artery 2.8 mm  with 6 mm distal cervical internal carotid artery corresponding to approximately  50% stenosis by NASCET criteria. Left carotid bifurcation demonstrates a relatively severe stenosis with the  lumen in the 1 mm range corresponding to at least 80% stenosis by NASCET  criteria. The left cervical internal carotid artery measures 5 mm. Dense coarse atherosclerotic calcification in the distal cervical internal  carotid arteries with at least mild stenosis. Carotid siphons also have  atherosclerotic calcification. Relatively mild stenosis. The right anterior cerebral artery is dominant. Relatively symmetric opacification of middle cerebral arteries. No definite  intraluminal filling defects or occlusion demonstrated. Prominent mucosal thickening right maxillary sinus. Mild chronic findings of cervical spondylosis C3-C7. Impression IMPRESSION:  1. At least 80% stenosis proximal left internal carotid artery stenosis. 2. Approximately 50% stenosis right proximal internal carotid artery stenosis. 3. Occlusion nondominant distal left vertebral artery. Diffuse atherosclerosis  left vertebral artery. 4. Please mild stenosis origin right vertebral artery.        Reviewed records in [facility] tab today    Lab Review   Results for orders placed or performed during the hospital encounter of 07/26/19   CBC W/O DIFF   Result Value Ref Range    WBC 5.8 3.6 - 11.0 K/uL    RBC 2.58 (L) 3.80 - 5.20 M/uL    HGB 8.0 (L) 11.5 - 16.0 g/dL    HCT 25.8 (L) 35.0 - 47.0 %    .0 (H) 80.0 - 99.0 FL    MCH 31.0 26.0 - 34.0 PG    MCHC 31.0 30.0 - 36.5 g/dL    RDW 13.0 11.5 - 14.5 %    PLATELET 753 177 - 283 K/uL    MPV 9.5 8.9 - 12.9 FL    NRBC 0.0 0  WBC    ABSOLUTE NRBC 0.00 0.00 - 0.01 K/uL   CBC W/O DIFF   Result Value Ref Range    WBC 7.1 3.6 - 11.0 K/uL    RBC 3.26 (L) 3.80 - 5.20 M/uL    HGB 10.2 (L) 11.5 - 16.0 g/dL    HCT 32.1 (L) 35.0 - 47.0 %    MCV 98.5 80.0 - 99.0 FL    MCH 31.3 26.0 - 34.0 PG    MCHC 31.8 30.0 - 36.5 g/dL    RDW 12.9 11.5 - 14.5 %    PLATELET 510 150 - 400 K/uL    MPV 9.6 8.9 - 12.9 FL    NRBC 0.0 0  WBC    ABSOLUTE NRBC 0.00 0.00 - 9.02 K/uL   METABOLIC PANEL, BASIC   Result Value Ref Range    Sodium 135 (L) 136 - 145 mmol/L    Potassium 4.8 3.5 - 5.1 mmol/L    Chloride 106 97 - 108 mmol/L    CO2 23 21 - 32 mmol/L    Anion gap 6 5 - 15 mmol/L    Glucose 142 (H) 65 - 100 mg/dL    BUN 23 (H) 6 - 20 MG/DL    Creatinine 1.33 (H) 0.55 - 1.02 MG/DL    BUN/Creatinine ratio 17 12 - 20      GFR est AA 47 (L) >60 ml/min/1.73m2    GFR est non-AA 39 (L) >60 ml/min/1.73m2    Calcium 9.1 8.5 - 10.1 MG/DL   GLUCOSE, POC   Result Value Ref Range    Glucose (POC) 126 (H) 65 - 100 mg/dL    Performed by Eula Kirby    GLUCOSE, POC   Result Value Ref Range    Glucose (POC) 139 (H) 65 - 100 mg/dL    Performed by Nemo Perez         Exam:  Visit Vitals  /58 (BP 1 Location: Right arm, BP Patient Position: Sitting)   Pulse 71   Resp 18   Ht 5' 5\" (1.651 m)   Wt 76.2 kg (168 lb)   SpO2 97%   BMI 27.96 kg/m²     General:  Alert, cooperative, no distress. Masked facies   Head:  Normocephalic, without obvious abnormality, atraumatic. Respiratory:  Heart:   Non labored breathing  Regular rate and rhythm, no murmurs   Neck:   2+ carotids, right bruits   Extremities: Warm, no cyanosis or edema. Pulses: 2+ radial pulses. Neurologic:  MS: Alert and oriented x 4, speech intact. Language intact. Attention and fund of knowledge appropriate. Recent and remote memory intact.   Cranial Nerves:  II: visual fields Pt had difficulty with right VFs   II: pupils    II: optic disc    III,VII: ptosis none   III,IV,VI: extraocular muscles  EOMI, no nystagmus or diplopia   V: facial light touch sensation     VII: facial muscle function   symmetric   VIII: hearing intact   IX: soft palate elevation     XI: trapezius strength     XI: sternocleidomastoid strength    XII: tongue       Motor: normal bulk and increased tone, left resting tremor, no action tremor seen today, Strength: 5/5 throughout, no PD  Sensory: (At 11/2018 OV: Dec to PP in feet, mod dec vibratory sensation)  Coordination: FTN intact, dysdiadochokinesia with NACHO bilaterally, dysmetria bilaterally with HTS  Gait:  Shuffling gait, dec arm swing on left, extra steps to turn  Reflexes: 2+ symmetric in UE, 1+knees, absent ankles           Assessment/Plan   Pt is a 67 y.o. right handed female with DM, CAD, HTN, HLD, and afib, initially and last seen in Nov, 2018 with c/o tremors in hands with activity, particularly writing, improved on Primidone. Additionally, c/o feeling very unsteady when walking, feels a \"nervousness\" in her legs and on exam had wide based unsteady gait and signs of peripheral neuropathy. Now representing at suggestion of her orthopedist for evaluation of parkinsons. Pt's exam has changed significantly, has masked facies, left resting tremor, no action tremor, dysdiadochokinesia with NACHO bilaterally, dysmetria with HTS, shuffling gait with dec arm swing on left. Findings c/w parkinsonism, primary vs secondary. Diagnosis discussed, symptomatic tx and goals of tx with medications, and importance of regular exercise. Recommend trial of Sinemet 25-100mg, 1 tab tid x 1 week, then 2 tabs tid, side effects reviewed. Referral to [facility] for PT. May stop Primidone, though may find it was controlling action tremor. F/u in clinic in 3 months, instructed to call in the interim if needed. Will discuss MRI C-spine findings at f/u visit. ICD-10-CM ICD-9-CM    1. Parkinsonism, unspecified [facility] G20 332.0 REFERRAL TO PHYSICAL THERAPY   2. Gait disturbance R26.9 781.2 REFERRAL TO PHYSICAL THERAPY       Signed:   Joselin Lucio MD  12/12/2019

## 2019-12-12 NOTE — PROGRESS NOTES
Chief Complaint   Patient presents with    Follow-up     tremors remain patient stated they are either the same or worse .  noted constant tremors throughout the day    Tremors     Visit Vitals  /58 (BP 1 Location: Right arm, BP Patient Position: Sitting)   Pulse 71   Resp 18   Ht 5' 5\" (1.651 m)   Wt 76.2 kg (168 lb)   SpO2 97%   BMI 27.96 kg/m²

## 2019-12-13 ENCOUNTER — TELEPHONE (OUTPATIENT)
Dept: NEUROLOGY | Age: 79
End: 2019-12-13

## 2019-12-16 ENCOUNTER — TELEPHONE (OUTPATIENT)
Dept: NEUROLOGY | Age: 79
End: 2019-12-16

## 2019-12-16 NOTE — TELEPHONE ENCOUNTER
----- Message from Dalia Dave sent at 12/16/2019 11:45 AM EST -----  Regarding: Brooke Anthony MD/Telephone  General Message/Vendor Calls    Caller's first and last name:  Tony Cazares     Reason for call: Tony Cazares from [facility] questioned how long the pt needs to take the 1 tablet of \"Sinemet\" before taking 2 tablets 3 times a day. Please call to clarify.        Callback required yes/no and why: Yes       Best contact number(s): [facility] Direct Line:[phone]/ Main Line:[phone]      Details to clarify the request: N/A

## 2019-12-18 ENCOUNTER — TELEPHONE (OUTPATIENT)
Dept: NEUROLOGY | Age: 79
End: 2019-12-18

## 2019-12-18 NOTE — TELEPHONE ENCOUNTER
Calling to state that [facility] sent a referral for PT but they do not accept pt insurance (out of network cost). Offered patient out of network cost of $85 per session but patient stated she could not afford it.

## 2020-01-24 ENCOUNTER — TELEPHONE (OUTPATIENT)
Dept: NEUROLOGY | Age: 80
End: 2020-01-24

## 2020-01-24 DIAGNOSIS — G20 PARKINSONISM, UNSPECIFIED PARKINSONISM TYPE (HCC): Primary | ICD-10-CM

## 2020-01-24 DIAGNOSIS — M48.02 CERVICAL STENOSIS OF SPINAL CANAL: ICD-10-CM

## 2020-01-24 DIAGNOSIS — G25.0 ESSENTIAL TREMOR: ICD-10-CM

## 2020-01-24 DIAGNOSIS — R26.9 GAIT DISTURBANCE: ICD-10-CM

## 2020-01-24 NOTE — TELEPHONE ENCOUNTER
Pt calling to know what the latest medication is suppose to be doing, b/c she states she has tremors worse than ever.  Please call back

## 2020-01-28 RX ORDER — CARBIDOPA AND LEVODOPA 25; 100 MG/1; MG/1
2 TABLET ORAL 4 TIMES DAILY
Qty: 720 TAB | Refills: 1 | Status: SHIPPED | OUTPATIENT
Start: 2020-01-28 | End: 2020-02-04 | Stop reason: SDUPTHER

## 2020-01-28 NOTE — TELEPHONE ENCOUNTER
Dr. Mike Lassiter after an extended conversation with this patient she stated that she saw a Dr. Marcelo Douglas, no first name given and he fixed her build up of goop in the side of her neck so she thinks she needs to f/u with him. She was not able to tell me whether he was a neurosurgeon but that she was to see him either this week or next for a 6 month follow. She eventually said she will try to fit in Dr. Haley De León, apparently they called her today and she didn't make the appointment but she will call back. Also let her know about home health and told her I will fax it over that's why she has not heard from them yet.

## 2020-01-28 NOTE — TELEPHONE ENCOUNTER
Pt calling, she stated she has some things to discuss w/ Dr. Davis Erm. She would like to discuss Dr. Swati Lea.  Please call back

## 2020-01-28 NOTE — TELEPHONE ENCOUNTER
Returned pt call:  Pt reports that her action tremors, such as when she is trying to eat breakfast in the morning, are not well controlled. At her office visit 12/12/2019 she reported resting tremor in her left hand. She reports she still has a resting tremor as well. She has stopped the primidone, but insists  the action tremors are no better or worse since stopping it. Recommend increasing the carbidopa levodopa to 2 tablets 4 times daily. She also complained of worsened gait, has not been to physical therapy, reports never hearing from [facility]. Patient has to take the bus for transportation and can only use a cane on a bus not her walker and is having such difficulties ambulating that she skipped going to the grocery store last week. Recommend home health PT for LSVT big Parkinson's therapy if possible      I discussed her admission in July revealing a stroke as well as severe cervical stenosis on MRI ordered by Dr. Griffin Galindo.  I explained to Dr. Mario Felix reviewed her MRI  and wanted her to follow-up in 3 months after she had time to heal from the stroke. Patient became incensed because she had never heard of this and had no idea who these doctors were. I have placed referral to Dr. Mario Felix.

## 2020-01-28 NOTE — TELEPHONE ENCOUNTER
Pt's complaint is that Carbidopa- Levodopa works sometimes but doesn't work all the time. She states she is up to the 2 tabs three times a day, but she sometimes have tremors really bad. Dr. Mere Hahn  she wants to know is there anything she can add or do to help with the tremors.

## 2020-01-29 ENCOUNTER — TELEPHONE (OUTPATIENT)
Dept: NEUROLOGY | Age: 80
End: 2020-01-29

## 2020-01-29 NOTE — TELEPHONE ENCOUNTER
[facility] w/ [address] calling to get clarification on pt's carbidopa-levodopa. She said if she is not there to ask for CT.  Please call back

## 2020-01-30 ENCOUNTER — TELEPHONE (OUTPATIENT)
Dept: NEUROLOGY | Age: 80
End: 2020-01-30

## 2020-01-30 NOTE — TELEPHONE ENCOUNTER
Tana richards/ [facility] stopped by the office, they do not accept pt's insurance. [facility]

## 2020-01-31 NOTE — TELEPHONE ENCOUNTER
[facility] not available and no one knows what she wanted informed them that it was about Jamal Briggs and her Sinemet but they said she will return on Monday. Will close this encounter and if she has a question to be addressed she will call back.

## 2020-02-03 NOTE — TELEPHONE ENCOUNTER
I advised patient to call her insurance company to find participating provider. She agreed to this plan.

## 2020-02-04 RX ORDER — CARBIDOPA AND LEVODOPA 25; 100 MG/1; MG/1
2 TABLET ORAL 4 TIMES DAILY
Qty: 720 TAB | Refills: 1 | OUTPATIENT
Start: 2020-02-04

## 2020-02-04 NOTE — TELEPHONE ENCOUNTER
Spoke with Thi Loza the Pharmacist, Gave the clarification order for the last order sent to Pharmacy on 01/28/2020 Sig - Route: Take 2 Tabs by mouth four (4) times daily. Take 1 tab by mouth three times a day, then take 2 tabs three times a day.  VRBO was done and noted

## 2020-02-04 NOTE — TELEPHONE ENCOUNTER
I called and spoke with Mr. Steph Rodriguez at [facility] and gave the correct dosing for Carbidopa-levodopa, 2 tabs four times a day.

## 2020-02-24 ENCOUNTER — HOSPITAL ENCOUNTER (OUTPATIENT)
Dept: CT IMAGING | Age: 80
Discharge: HOME | End: 2020-02-24
Attending: SURGERY
Payer: MEDICARE

## 2020-02-24 DIAGNOSIS — I63.239 SYMPTOMATIC CAROTID ARTERY STENOSIS WITH INFARCTION (HCC): ICD-10-CM

## 2020-02-24 LAB — CREAT BLD-MCNC: 1.6 MG/DL (ref 0.6–1.3)

## 2020-02-24 PROCEDURE — 74011636320 HC RX REV CODE- 636/320: Performed by: RADIOLOGY

## 2020-02-24 PROCEDURE — 82565 ASSAY OF CREATININE: CPT

## 2020-02-24 PROCEDURE — 70498 CT ANGIOGRAPHY NECK: CPT

## 2020-02-24 PROCEDURE — 74011000258 HC RX REV CODE- 258: Performed by: RADIOLOGY

## 2020-02-24 PROCEDURE — 70496 CT ANGIOGRAPHY HEAD: CPT

## 2020-02-24 RX ORDER — SODIUM CHLORIDE 0.9 % (FLUSH) 0.9 %
10 SYRINGE (ML) INJECTION
Status: COMPLETED | OUTPATIENT
Start: 2020-02-24 | End: 2020-02-24

## 2020-02-24 RX ADMIN — Medication 10 ML: at 10:44

## 2020-02-24 RX ADMIN — SODIUM CHLORIDE 100 ML: 900 INJECTION, SOLUTION INTRAVENOUS at 10:44

## 2020-02-24 RX ADMIN — IOPAMIDOL 80 ML: 755 INJECTION, SOLUTION INTRAVENOUS at 10:44

## 2020-03-18 ENCOUNTER — HOSPITAL ENCOUNTER (OUTPATIENT)
Dept: GENERAL RADIOLOGY | Age: 80
Discharge: HOME | End: 2020-03-18
Attending: SURGERY
Payer: MEDICARE

## 2020-03-18 ENCOUNTER — HOSPITAL ENCOUNTER (OUTPATIENT)
Dept: PREADMISSION TESTING | Age: 80
Discharge: HOME | End: 2020-03-18
Payer: MEDICARE

## 2020-03-18 VITALS
SYSTOLIC BLOOD PRESSURE: 120 MMHG | WEIGHT: 160 LBS | HEART RATE: 62 BPM | DIASTOLIC BLOOD PRESSURE: 66 MMHG | TEMPERATURE: 98.7 F | BODY MASS INDEX: 26.66 KG/M2 | HEIGHT: 65 IN

## 2020-03-18 LAB
ABO + RH BLD: NORMAL
ALBUMIN SERPL-MCNC: 3.7 G/DL (ref 3.5–5)
ALBUMIN/GLOB SERPL: 1.4 {RATIO} (ref 1.1–2.2)
ALP SERPL-CCNC: 102 U/L (ref 45–117)
ALT SERPL-CCNC: 10 U/L (ref 12–78)
ANION GAP SERPL CALC-SCNC: 5 MMOL/L (ref 5–15)
APTT PPP: 25.3 SEC (ref 22.1–32)
AST SERPL-CCNC: 19 U/L (ref 15–37)
ATRIAL RATE: 55 BPM
BASOPHILS # BLD: 0 K/UL (ref 0–0.1)
BASOPHILS NFR BLD: 0 % (ref 0–1)
BILIRUB SERPL-MCNC: 0.6 MG/DL (ref 0.2–1)
BLOOD GROUP ANTIBODIES SERPL: NORMAL
BUN SERPL-MCNC: 32 MG/DL (ref 6–20)
BUN/CREAT SERPL: 21 (ref 12–20)
CALCIUM SERPL-MCNC: 9.4 MG/DL (ref 8.5–10.1)
CALCULATED P AXIS, ECG09: -21 DEGREES
CALCULATED R AXIS, ECG10: -47 DEGREES
CALCULATED T AXIS, ECG11: 114 DEGREES
CHLORIDE SERPL-SCNC: 109 MMOL/L (ref 97–108)
CO2 SERPL-SCNC: 26 MMOL/L (ref 21–32)
CREAT SERPL-MCNC: 1.5 MG/DL (ref 0.55–1.02)
DIAGNOSIS, 93000: NORMAL
DIFFERENTIAL METHOD BLD: ABNORMAL
EOSINOPHIL # BLD: 0.2 K/UL (ref 0–0.4)
EOSINOPHIL NFR BLD: 3 % (ref 0–7)
ERYTHROCYTE [DISTWIDTH] IN BLOOD BY AUTOMATED COUNT: 13.5 % (ref 11.5–14.5)
EST. AVERAGE GLUCOSE BLD GHB EST-MCNC: 117 MG/DL
GLOBULIN SER CALC-MCNC: 2.7 G/DL (ref 2–4)
GLUCOSE SERPL-MCNC: 99 MG/DL (ref 65–100)
HBA1C MFR BLD: 5.7 % (ref 4–5.6)
HCT VFR BLD AUTO: 35.2 % (ref 35–47)
HGB BLD-MCNC: 11.3 G/DL (ref 11.5–16)
IMM GRANULOCYTES # BLD AUTO: 0 K/UL (ref 0–0.04)
IMM GRANULOCYTES NFR BLD AUTO: 0 % (ref 0–0.5)
INR PPP: 1.1 (ref 0.9–1.1)
LYMPHOCYTES # BLD: 1 K/UL (ref 0.8–3.5)
LYMPHOCYTES NFR BLD: 16 % (ref 12–49)
MCH RBC QN AUTO: 29.7 PG (ref 26–34)
MCHC RBC AUTO-ENTMCNC: 32.1 G/DL (ref 30–36.5)
MCV RBC AUTO: 92.6 FL (ref 80–99)
MONOCYTES # BLD: 0.6 K/UL (ref 0–1)
MONOCYTES NFR BLD: 10 % (ref 5–13)
NEUTS SEG # BLD: 4.3 K/UL (ref 1.8–8)
NEUTS SEG NFR BLD: 71 % (ref 32–75)
NRBC # BLD: 0 K/UL (ref 0–0.01)
NRBC BLD-RTO: 0 PER 100 WBC
P-R INTERVAL, ECG05: 166 MS
PLATELET # BLD AUTO: 198 K/UL (ref 150–400)
PMV BLD AUTO: 9.1 FL (ref 8.9–12.9)
POTASSIUM SERPL-SCNC: 4.8 MMOL/L (ref 3.5–5.1)
PROT SERPL-MCNC: 6.4 G/DL (ref 6.4–8.2)
PROTHROMBIN TIME: 11 SEC (ref 9–11.1)
Q-T INTERVAL, ECG07: 448 MS
QRS DURATION, ECG06: 106 MS
QTC CALCULATION (BEZET), ECG08: 428 MS
RBC # BLD AUTO: 3.8 M/UL (ref 3.8–5.2)
SODIUM SERPL-SCNC: 140 MMOL/L (ref 136–145)
SPECIMEN EXP DATE BLD: NORMAL
THERAPEUTIC RANGE,PTTT: NORMAL SECS (ref 58–77)
VENTRICULAR RATE, ECG03: 55 BPM
WBC # BLD AUTO: 6.1 K/UL (ref 3.6–11)

## 2020-03-18 PROCEDURE — 83036 HEMOGLOBIN GLYCOSYLATED A1C: CPT

## 2020-03-18 PROCEDURE — 85025 COMPLETE CBC W/AUTO DIFF WBC: CPT

## 2020-03-18 PROCEDURE — 80053 COMPREHEN METABOLIC PANEL: CPT

## 2020-03-18 PROCEDURE — 71046 X-RAY EXAM CHEST 2 VIEWS: CPT

## 2020-03-18 PROCEDURE — 85610 PROTHROMBIN TIME: CPT

## 2020-03-18 PROCEDURE — 36415 COLL VENOUS BLD VENIPUNCTURE: CPT

## 2020-03-18 PROCEDURE — 86900 BLOOD TYPING SEROLOGIC ABO: CPT

## 2020-03-18 PROCEDURE — 93005 ELECTROCARDIOGRAM TRACING: CPT

## 2020-03-18 PROCEDURE — 85730 THROMBOPLASTIN TIME PARTIAL: CPT

## 2020-03-18 RX ORDER — HYDRALAZINE HYDROCHLORIDE 25 MG/1
25 TABLET, FILM COATED ORAL 2 TIMES DAILY
COMMUNITY
End: 2020-05-11

## 2020-03-18 RX ORDER — ATORVASTATIN CALCIUM 40 MG/1
40 TABLET, FILM COATED ORAL
COMMUNITY
End: 2021-07-16 | Stop reason: SDUPTHER

## 2020-03-18 RX ORDER — CLOPIDOGREL BISULFATE 75 MG/1
75 TABLET ORAL
COMMUNITY
End: 2022-02-18

## 2020-03-18 RX ORDER — NITROGLYCERIN 0.4 MG/1
0.4 TABLET SUBLINGUAL
COMMUNITY

## 2020-03-18 RX ORDER — TRIAMCINOLONE ACETONIDE 55 UG/1
2 SPRAY, METERED NASAL DAILY
COMMUNITY
End: 2020-05-11

## 2020-03-18 RX ORDER — ASPIRIN 81 MG/1
81 TABLET ORAL
COMMUNITY
End: 2021-08-26 | Stop reason: ALTCHOICE

## 2020-03-18 RX ORDER — NABUMETONE 1000 MG/1
TABLET ORAL
COMMUNITY
End: 2020-05-11

## 2020-03-18 NOTE — PERIOP NOTES
PT HAS TRANSPORTATION CONFLICT FOR CHEST XRAY APPOINTMENT , CALLED DR JIMÉNEZ`S  OFFICE AND SPOKE TO RAQUEL. SINCE PT HAS DRY COUGH FOR MORE THAN 2 WEEKS, ENCOURAGED TO DO THE CHEST XRAY BEFORE SURGERY ,PT IS AGREED TO CALL [facility] FOR TRANSPORTATION.

## 2020-04-30 ENCOUNTER — HOSPITAL ENCOUNTER (INPATIENT)
Age: 80
LOS: 11 days | Discharge: NURSING, REHAB OR LONG-TERM CARE | DRG: 057 | End: 2020-05-11
Attending: EMERGENCY MEDICINE | Admitting: FAMILY MEDICINE
Payer: MEDICARE

## 2020-04-30 ENCOUNTER — APPOINTMENT (OUTPATIENT)
Dept: GENERAL RADIOLOGY | Age: 80
DRG: 057 | End: 2020-04-30
Attending: EMERGENCY MEDICINE
Payer: MEDICARE

## 2020-04-30 DIAGNOSIS — R53.1 WEAKNESS: Primary | ICD-10-CM

## 2020-04-30 DIAGNOSIS — N17.9 AKI (ACUTE KIDNEY INJURY) (HCC): ICD-10-CM

## 2020-04-30 LAB
ALBUMIN SERPL-MCNC: 3.5 G/DL (ref 3.5–5)
ALBUMIN/GLOB SERPL: 1.1 {RATIO} (ref 1.1–2.2)
ALP SERPL-CCNC: 96 U/L (ref 45–117)
ALT SERPL-CCNC: 12 U/L (ref 12–78)
ANION GAP SERPL CALC-SCNC: 6 MMOL/L (ref 5–15)
APPEARANCE UR: CLEAR
AST SERPL-CCNC: 33 U/L (ref 15–37)
BACTERIA URNS QL MICRO: NEGATIVE /HPF
BASOPHILS # BLD: 0.1 K/UL (ref 0–0.1)
BASOPHILS NFR BLD: 1 % (ref 0–1)
BILIRUB SERPL-MCNC: 0.9 MG/DL (ref 0.2–1)
BILIRUB UR QL CFM: NEGATIVE
BUN SERPL-MCNC: 32 MG/DL (ref 6–20)
BUN/CREAT SERPL: 17 (ref 12–20)
CALCIUM SERPL-MCNC: 10.5 MG/DL (ref 8.5–10.1)
CHLORIDE SERPL-SCNC: 105 MMOL/L (ref 97–108)
CO2 SERPL-SCNC: 25 MMOL/L (ref 21–32)
COLOR UR: ABNORMAL
COMMENT, HOLDF: NORMAL
COMMENT, HOLDF: NORMAL
CREAT SERPL-MCNC: 1.86 MG/DL (ref 0.55–1.02)
DIFFERENTIAL METHOD BLD: ABNORMAL
EOSINOPHIL # BLD: 0.1 K/UL (ref 0–0.4)
EOSINOPHIL NFR BLD: 2 % (ref 0–7)
EPITH CASTS URNS QL MICRO: ABNORMAL /LPF
ERYTHROCYTE [DISTWIDTH] IN BLOOD BY AUTOMATED COUNT: 13.9 % (ref 11.5–14.5)
GLOBULIN SER CALC-MCNC: 3.1 G/DL (ref 2–4)
GLUCOSE SERPL-MCNC: 153 MG/DL (ref 65–100)
GLUCOSE UR STRIP.AUTO-MCNC: NEGATIVE MG/DL
HCT VFR BLD AUTO: 34.2 % (ref 35–47)
HGB BLD-MCNC: 11.1 G/DL (ref 11.5–16)
HGB UR QL STRIP: NEGATIVE
HYALINE CASTS URNS QL MICRO: ABNORMAL /LPF (ref 0–5)
IMM GRANULOCYTES # BLD AUTO: 0 K/UL (ref 0–0.04)
IMM GRANULOCYTES NFR BLD AUTO: 0 % (ref 0–0.5)
KETONES UR QL STRIP.AUTO: 15 MG/DL
LEUKOCYTE ESTERASE UR QL STRIP.AUTO: NEGATIVE
LYMPHOCYTES # BLD: 1.3 K/UL (ref 0.8–3.5)
LYMPHOCYTES NFR BLD: 24 % (ref 12–49)
MCH RBC QN AUTO: 30.1 PG (ref 26–34)
MCHC RBC AUTO-ENTMCNC: 32.5 G/DL (ref 30–36.5)
MCV RBC AUTO: 92.7 FL (ref 80–99)
MONOCYTES # BLD: 0.6 K/UL (ref 0–1)
MONOCYTES NFR BLD: 12 % (ref 5–13)
NEUTS SEG # BLD: 3.4 K/UL (ref 1.8–8)
NEUTS SEG NFR BLD: 61 % (ref 32–75)
NITRITE UR QL STRIP.AUTO: NEGATIVE
NRBC # BLD: 0 K/UL (ref 0–0.01)
NRBC BLD-RTO: 0 PER 100 WBC
PH UR STRIP: 5 [PH] (ref 5–8)
PLATELET # BLD AUTO: 207 K/UL (ref 150–400)
PMV BLD AUTO: 9.1 FL (ref 8.9–12.9)
POTASSIUM SERPL-SCNC: 4.6 MMOL/L (ref 3.5–5.1)
PROT SERPL-MCNC: 6.6 G/DL (ref 6.4–8.2)
PROT UR STRIP-MCNC: 100 MG/DL
RBC # BLD AUTO: 3.69 M/UL (ref 3.8–5.2)
RBC #/AREA URNS HPF: ABNORMAL /HPF (ref 0–5)
SAMPLES BEING HELD,HOLD: NORMAL
SAMPLES BEING HELD,HOLD: NORMAL
SODIUM SERPL-SCNC: 136 MMOL/L (ref 136–145)
SP GR UR REFRACTOMETRY: 1.02 (ref 1–1.03)
TROPONIN I SERPL-MCNC: <0.05 NG/ML
UR CULT HOLD, URHOLD: NORMAL
UROBILINOGEN UR QL STRIP.AUTO: 1 EU/DL (ref 0.2–1)
WBC # BLD AUTO: 5.5 K/UL (ref 3.6–11)
WBC URNS QL MICRO: ABNORMAL /HPF (ref 0–4)

## 2020-04-30 PROCEDURE — 71045 X-RAY EXAM CHEST 1 VIEW: CPT

## 2020-04-30 PROCEDURE — 85025 COMPLETE CBC W/AUTO DIFF WBC: CPT

## 2020-04-30 PROCEDURE — 80053 COMPREHEN METABOLIC PANEL: CPT

## 2020-04-30 PROCEDURE — 74011250636 HC RX REV CODE- 250/636: Performed by: EMERGENCY MEDICINE

## 2020-04-30 PROCEDURE — 99285 EMERGENCY DEPT VISIT HI MDM: CPT

## 2020-04-30 PROCEDURE — 84484 ASSAY OF TROPONIN QUANT: CPT

## 2020-04-30 PROCEDURE — 87635 SARS-COV-2 COVID-19 AMP PRB: CPT

## 2020-04-30 PROCEDURE — 93005 ELECTROCARDIOGRAM TRACING: CPT

## 2020-04-30 PROCEDURE — 36415 COLL VENOUS BLD VENIPUNCTURE: CPT

## 2020-04-30 PROCEDURE — 81001 URINALYSIS AUTO W/SCOPE: CPT

## 2020-04-30 PROCEDURE — 65270000032 HC RM SEMIPRIVATE

## 2020-04-30 PROCEDURE — 65270000029 HC RM PRIVATE

## 2020-04-30 RX ORDER — SODIUM CHLORIDE, SODIUM LACTATE, POTASSIUM CHLORIDE, CALCIUM CHLORIDE 600; 310; 30; 20 MG/100ML; MG/100ML; MG/100ML; MG/100ML
75 INJECTION, SOLUTION INTRAVENOUS CONTINUOUS
Status: DISCONTINUED | OUTPATIENT
Start: 2020-04-30 | End: 2020-05-11 | Stop reason: HOSPADM

## 2020-04-30 RX ORDER — ACETAMINOPHEN 325 MG/1
650 TABLET ORAL
Status: DISCONTINUED | OUTPATIENT
Start: 2020-04-30 | End: 2020-05-11 | Stop reason: HOSPADM

## 2020-04-30 RX ADMIN — SODIUM CHLORIDE 1000 ML: 900 INJECTION, SOLUTION INTRAVENOUS at 20:23

## 2020-04-30 NOTE — ED PROVIDER NOTES
HPI     61-year-old female with a history of anxiety, A. fib, coronary artery disease status post stents. Carotid stenosis. Chronic kidney disease. Cough. CVA, Parkinson's disease, diabetes, hypertension, high cholesterol, valvular heart disease, presents the emergency department with generalized weakness. She states she has been feeling weak and gradually getting worse over the past several weeks. Today she was unable to walk at all. She gets 1 meal a day delivered by [employer]. She states she has not been eating or drinking much. She does have a cough but does not feel short of breath. She does not feel like she has had fever or chills. She states she has decreased urination, but no pain or frequency. She has been falling, her last fall was about 2 weeks ago. She denies hitting her head or loss of consciousness at that time. She does not have a headache now. She denies any chest pain. She denies abdominal pain, nausea vomiting or diarrhea. She denies focal weakness. She was due to have a carotid artery surgery that was canceled to the pandemic. She denies any change in medications.       Past Medical History:   Diagnosis Date    Anxiety disorder     Atrial fibrillation (HCC)     CAD (coronary artery disease) 2007    stents, CABG x 3v    Carotid stenosis     Cervical stenosis of spinal canal 07/2019    Chronic kidney disease     Cough     CVA (cerebral vascular accident) ([address] 07/2019    left lacunar infarct at head of caudate    Depression     AND CHRONIC ANXIETY    Diabetes (HCC)     GERD (gastroesophageal reflux disease)     High cholesterol     History of peptic ulcer     Bleeding ulcer with increased NSAID use    Hypertension     Left carotid stenosis 07/2019    s/p left CEA with Dr. Mcmanus Factor, old 2007    PUD (peptic ulcer disease)     Stroke [facility] [address]     Tremor     Valvular heart disease        Past Surgical History:   Procedure Laterality Date    CARDIAC SURG PROCEDURE UNLIST      CABG X3 VESSEL    HX CAROTID ENDARTERECTOMY  2019    HX CORONARY ARTERY BYPASS GRAFT      HX TONSILLECTOMY  1963    TOTAL KNEE ARTHROPLASTY Right 2015         Family History:   Problem Relation Age of Onset    Heart Attack Mother 72        Dec 81yo    Hypertension Mother     Other Father         Unknown    Parkinson's Disease Brother     Anesth Problems Neg Hx        Social History     Socioeconomic History    Marital status: SINGLE     Spouse name: Not on file    Number of children: Not on file    Years of education: Not on file    Highest education level: Not on file   Occupational History    Occupation: Retired realestate/teacher   Social Needs    Financial resource strain: Not on file    Food insecurity     Worry: Not on file     Inability: Not on file   [employer] needs     Medical: Not on file     Non-medical: Not on file   Tobacco Use    Smoking status: Former Smoker     Packs/day: 0.25     Years: 5.00     Pack years: 1.25     Types: Cigarettes     Last attempt to quit:      Years since quittin.3    Smokeless tobacco: Never Used   Substance and Sexual Activity    Alcohol use:  Yes     Alcohol/week: 0.0 standard drinks     Comment: Rare    Drug use: No    Sexual activity: Not on file   Lifestyle    Physical activity     Days per week: Not on file     Minutes per session: Not on file    Stress: Not on file   Relationships    Social connections     Talks on phone: Not on file     Gets together: Not on file     Attends [employer] service: Not on file     Active member of club or organization: Not on file     Attends meetings of clubs or organizations: Not on file     Relationship status: Not on file    Intimate partner violence     Fear of current or ex partner: Not on file     Emotionally abused: Not on file     Physically abused: Not on file     Forced sexual activity: Not on file   Other Topics Concern    Not on file   Social History Narrative    Lives in [facility]         ALLERGIES: Patient has no known allergies. Review of Systems   Constitutional: Negative for fever. HENT: Negative for congestion. Eyes: Negative for visual disturbance. Respiratory: Positive for cough. Negative for chest tightness and shortness of breath. Cardiovascular: Negative for chest pain, palpitations and leg swelling. Gastrointestinal: Negative for abdominal pain, diarrhea, nausea and vomiting. Genitourinary: Positive for decreased urine volume. Negative for dysuria. Musculoskeletal: Positive for gait problem. Skin: Negative for rash. Neurological: Positive for weakness. Negative for headaches. Psychiatric/Behavioral: Negative for dysphoric mood. Vitals:    04/30/20 1908   BP: 126/49   Pulse: 60   Resp: 19   Temp: 98.2 °F (36.8 °C)   SpO2: 97%            Physical Exam  Constitutional:       General: She is not in acute distress. Appearance: She is well-developed. HENT:      Head: Normocephalic and atraumatic. Mouth/Throat:      Pharynx: No oropharyngeal exudate. Eyes:      General: No scleral icterus. Right eye: No discharge. Left eye: No discharge. Pupils: Pupils are equal, round, and reactive to light. Neck:      Musculoskeletal: Normal range of motion and neck supple. Vascular: No JVD. Cardiovascular:      Rate and Rhythm: Normal rate and regular rhythm. Heart sounds: Normal heart sounds. No murmur. Pulmonary:      Effort: Pulmonary effort is normal. No respiratory distress. Breath sounds: Normal breath sounds. No stridor. No wheezing or rales. Chest:      Chest wall: No tenderness. Abdominal:      General: Bowel sounds are normal. There is no distension. Palpations: Abdomen is soft. There is no mass. Tenderness: There is no abdominal tenderness. There is no guarding or rebound. Musculoskeletal: Normal range of motion. Skin:     General: Skin is warm and dry. Capillary Refill: Capillary refill takes less than 2 seconds. Findings: No rash. Neurological:      Mental Status: She is oriented to person, place, and time. Psychiatric:         Behavior: Behavior normal.         Thought Content: Thought content normal.         Judgment: Judgment normal.          MDM       Procedures        ED EKG interpretation:  Rhythm: sinus bradycardia; and regular . Rate (approx.): 58; Axis: left axis deviation; P wave: normal; QRS interval: prolonged; ST/T wave: non-specific changes; T wave inversion V5/6 is new  This EKG was interpreted by Eugenia Cornelius MD,ED Provider. work up reassuring other then mild bump in bun/cr. Patient lives independently and unable to walk. Will admit. Hospitalist Aaron Serve for Admission  8:57 PM    ED Room Number: ER12/12  Patient Name and age:  Jamil Schmitz 67 y.o.  female  Working Diagnosis:   1. Weakness        COVID-19 Suspicion:  no    Readmission: no  Isolation Requirements:  no  Recommended Level of Care:  med/surg  Department:[facility] Adult ED - [phone]  Other:  67year old female with parkinson's disease. Lives alone. Progressive weakness. Can';t walk at this point. Work up just shows mild MORENO.

## 2020-04-30 NOTE — ED TRIAGE NOTES
Pt comes in from home for generalized weakness since December. Pt endorses frequent falls and states her last fall was within 2 weeks. Pt lives alone at [address] [place] [address]. Denies CP, dizziness, syncope.

## 2020-04-30 NOTE — ED NOTES
Bedside and Verbal shift change report given to [facility] (oncoming nurse) by [facility] (offgoing nurse). Report included the following information SBAR, ED Summary, MAR and Recent Results.

## 2020-05-01 LAB
ALBUMIN SERPL-MCNC: 2.9 G/DL (ref 3.5–5)
ALBUMIN/GLOB SERPL: 1.1 {RATIO} (ref 1.1–2.2)
ALP SERPL-CCNC: 88 U/L (ref 45–117)
ALT SERPL-CCNC: 15 U/L (ref 12–78)
ANION GAP SERPL CALC-SCNC: 6 MMOL/L (ref 5–15)
AST SERPL-CCNC: 28 U/L (ref 15–37)
ATRIAL RATE: 119 BPM
ATRIAL RATE: 58 BPM
BASOPHILS # BLD: 0 K/UL (ref 0–0.1)
BASOPHILS NFR BLD: 0 % (ref 0–1)
BILIRUB SERPL-MCNC: 0.5 MG/DL (ref 0.2–1)
BUN SERPL-MCNC: 28 MG/DL (ref 6–20)
BUN/CREAT SERPL: 20 (ref 12–20)
CALCIUM SERPL-MCNC: 9.5 MG/DL (ref 8.5–10.1)
CALCULATED P AXIS, ECG09: 9 DEGREES
CALCULATED R AXIS, ECG10: -46 DEGREES
CALCULATED R AXIS, ECG10: -49 DEGREES
CALCULATED T AXIS, ECG11: 117 DEGREES
CALCULATED T AXIS, ECG11: 144 DEGREES
CHLORIDE SERPL-SCNC: 108 MMOL/L (ref 97–108)
CHOLEST SERPL-MCNC: 129 MG/DL
CO2 SERPL-SCNC: 24 MMOL/L (ref 21–32)
CREAT SERPL-MCNC: 1.41 MG/DL (ref 0.55–1.02)
DIAGNOSIS, 93000: NORMAL
DIAGNOSIS, 93000: NORMAL
DIFFERENTIAL METHOD BLD: ABNORMAL
EOSINOPHIL # BLD: 0.1 K/UL (ref 0–0.4)
EOSINOPHIL NFR BLD: 2 % (ref 0–7)
ERYTHROCYTE [DISTWIDTH] IN BLOOD BY AUTOMATED COUNT: 13.6 % (ref 11.5–14.5)
GLOBULIN SER CALC-MCNC: 2.7 G/DL (ref 2–4)
GLUCOSE SERPL-MCNC: 153 MG/DL (ref 65–100)
HCT VFR BLD AUTO: 31.1 % (ref 35–47)
HDLC SERPL-MCNC: 36 MG/DL
HDLC SERPL: 3.6 {RATIO} (ref 0–5)
HGB BLD-MCNC: 10.3 G/DL (ref 11.5–16)
IMM GRANULOCYTES # BLD AUTO: 0 K/UL (ref 0–0.04)
IMM GRANULOCYTES NFR BLD AUTO: 0 % (ref 0–0.5)
LDLC SERPL CALC-MCNC: 74.6 MG/DL (ref 0–100)
LIPID PROFILE,FLP: NORMAL
LYMPHOCYTES # BLD: 0.8 K/UL (ref 0.8–3.5)
LYMPHOCYTES NFR BLD: 18 % (ref 12–49)
MAGNESIUM SERPL-MCNC: 1.6 MG/DL (ref 1.6–2.4)
MCH RBC QN AUTO: 30.2 PG (ref 26–34)
MCHC RBC AUTO-ENTMCNC: 33.1 G/DL (ref 30–36.5)
MCV RBC AUTO: 91.2 FL (ref 80–99)
MONOCYTES # BLD: 0.4 K/UL (ref 0–1)
MONOCYTES NFR BLD: 9 % (ref 5–13)
NEUTS SEG # BLD: 3.3 K/UL (ref 1.8–8)
NEUTS SEG NFR BLD: 71 % (ref 32–75)
NRBC # BLD: 0 K/UL (ref 0–0.01)
NRBC BLD-RTO: 0 PER 100 WBC
P-R INTERVAL, ECG05: 158 MS
PHOSPHATE SERPL-MCNC: 2.9 MG/DL (ref 2.6–4.7)
PLATELET # BLD AUTO: 208 K/UL (ref 150–400)
PMV BLD AUTO: 9.1 FL (ref 8.9–12.9)
POTASSIUM SERPL-SCNC: 3.8 MMOL/L (ref 3.5–5.1)
PROT SERPL-MCNC: 5.6 G/DL (ref 6.4–8.2)
Q-T INTERVAL, ECG07: 356 MS
Q-T INTERVAL, ECG07: 466 MS
QRS DURATION, ECG06: 100 MS
QRS DURATION, ECG06: 106 MS
QTC CALCULATION (BEZET), ECG08: 457 MS
QTC CALCULATION (BEZET), ECG08: 477 MS
RBC # BLD AUTO: 3.41 M/UL (ref 3.8–5.2)
SARS-COV-2, COV2: NOT DETECTED
SODIUM SERPL-SCNC: 138 MMOL/L (ref 136–145)
SPECIMEN SOURCE, FCOV2M: NORMAL
TRIGL SERPL-MCNC: 92 MG/DL (ref ?–150)
TROPONIN I SERPL-MCNC: 1.11 NG/ML
TROPONIN I SERPL-MCNC: 1.58 NG/ML
TROPONIN I SERPL-MCNC: 1.81 NG/ML
TROPONIN I SERPL-MCNC: 1.96 NG/ML
TROPONIN I SERPL-MCNC: 2.01 NG/ML
TSH SERPL DL<=0.05 MIU/L-ACNC: 0.53 UIU/ML (ref 0.36–3.74)
VENTRICULAR RATE, ECG03: 108 BPM
VENTRICULAR RATE, ECG03: 58 BPM
VLDLC SERPL CALC-MCNC: 18.4 MG/DL
WBC # BLD AUTO: 4.6 K/UL (ref 3.6–11)

## 2020-05-01 PROCEDURE — 93005 ELECTROCARDIOGRAM TRACING: CPT

## 2020-05-01 PROCEDURE — 80053 COMPREHEN METABOLIC PANEL: CPT

## 2020-05-01 PROCEDURE — 36415 COLL VENOUS BLD VENIPUNCTURE: CPT

## 2020-05-01 PROCEDURE — 85025 COMPLETE CBC W/AUTO DIFF WBC: CPT

## 2020-05-01 PROCEDURE — 74011250637 HC RX REV CODE- 250/637: Performed by: SPECIALIST

## 2020-05-01 PROCEDURE — 74011250636 HC RX REV CODE- 250/636: Performed by: INTERNAL MEDICINE

## 2020-05-01 PROCEDURE — 80061 LIPID PANEL: CPT

## 2020-05-01 PROCEDURE — 74011250636 HC RX REV CODE- 250/636: Performed by: HOSPITALIST

## 2020-05-01 PROCEDURE — 84484 ASSAY OF TROPONIN QUANT: CPT

## 2020-05-01 PROCEDURE — 84100 ASSAY OF PHOSPHORUS: CPT

## 2020-05-01 PROCEDURE — 74011250637 HC RX REV CODE- 250/637: Performed by: INTERNAL MEDICINE

## 2020-05-01 PROCEDURE — 84443 ASSAY THYROID STIM HORMONE: CPT

## 2020-05-01 PROCEDURE — 83735 ASSAY OF MAGNESIUM: CPT

## 2020-05-01 PROCEDURE — 65660000001 HC RM ICU INTERMED STEPDOWN

## 2020-05-01 RX ORDER — HYDRALAZINE HYDROCHLORIDE 25 MG/1
25 TABLET, FILM COATED ORAL 2 TIMES DAILY
Status: DISCONTINUED | OUTPATIENT
Start: 2020-05-01 | End: 2020-05-04

## 2020-05-01 RX ORDER — CLOPIDOGREL BISULFATE 75 MG/1
75 TABLET ORAL
Status: DISCONTINUED | OUTPATIENT
Start: 2020-05-01 | End: 2020-05-11 | Stop reason: HOSPADM

## 2020-05-01 RX ORDER — DIAZEPAM 2 MG/1
2 TABLET ORAL 3 TIMES DAILY
Status: DISCONTINUED | OUTPATIENT
Start: 2020-05-01 | End: 2020-05-11 | Stop reason: HOSPADM

## 2020-05-01 RX ORDER — ATORVASTATIN CALCIUM 40 MG/1
40 TABLET, FILM COATED ORAL
Status: DISCONTINUED | OUTPATIENT
Start: 2020-05-01 | End: 2020-05-11 | Stop reason: HOSPADM

## 2020-05-01 RX ORDER — HEPARIN SODIUM 5000 [USP'U]/ML
5000 INJECTION, SOLUTION INTRAVENOUS; SUBCUTANEOUS EVERY 8 HOURS
Status: DISCONTINUED | OUTPATIENT
Start: 2020-05-01 | End: 2020-05-11 | Stop reason: HOSPADM

## 2020-05-01 RX ORDER — MAGNESIUM SULFATE HEPTAHYDRATE 40 MG/ML
2 INJECTION, SOLUTION INTRAVENOUS ONCE
Status: COMPLETED | OUTPATIENT
Start: 2020-05-01 | End: 2020-05-01

## 2020-05-01 RX ORDER — SODIUM CHLORIDE 0.9 % (FLUSH) 0.9 %
5-40 SYRINGE (ML) INJECTION EVERY 8 HOURS
Status: DISCONTINUED | OUTPATIENT
Start: 2020-05-01 | End: 2020-05-11 | Stop reason: HOSPADM

## 2020-05-01 RX ORDER — CARVEDILOL 12.5 MG/1
12.5 TABLET ORAL 2 TIMES DAILY WITH MEALS
COMMUNITY
End: 2020-05-11

## 2020-05-01 RX ORDER — NITROGLYCERIN 0.4 MG/1
0.4 TABLET SUBLINGUAL
Status: DISCONTINUED | OUTPATIENT
Start: 2020-05-01 | End: 2020-05-11 | Stop reason: HOSPADM

## 2020-05-01 RX ORDER — CARVEDILOL 12.5 MG/1
25 TABLET ORAL 2 TIMES DAILY WITH MEALS
Status: DISCONTINUED | OUTPATIENT
Start: 2020-05-01 | End: 2020-05-04

## 2020-05-01 RX ORDER — CARVEDILOL 12.5 MG/1
12.5 TABLET ORAL ONCE
Status: COMPLETED | OUTPATIENT
Start: 2020-05-01 | End: 2020-05-01

## 2020-05-01 RX ORDER — ONDANSETRON 2 MG/ML
4 INJECTION INTRAMUSCULAR; INTRAVENOUS
Status: DISCONTINUED | OUTPATIENT
Start: 2020-05-01 | End: 2020-05-11 | Stop reason: HOSPADM

## 2020-05-01 RX ORDER — CILOSTAZOL 50 MG/1
100 TABLET ORAL 2 TIMES DAILY
Status: DISCONTINUED | OUTPATIENT
Start: 2020-05-01 | End: 2020-05-02 | Stop reason: ALTCHOICE

## 2020-05-01 RX ORDER — SODIUM CHLORIDE 0.9 % (FLUSH) 0.9 %
5-40 SYRINGE (ML) INJECTION AS NEEDED
Status: DISCONTINUED | OUTPATIENT
Start: 2020-05-01 | End: 2020-05-11 | Stop reason: HOSPADM

## 2020-05-01 RX ORDER — ARIPIPRAZOLE 20 MG/1
20 TABLET ORAL DAILY
COMMUNITY
End: 2020-05-11

## 2020-05-01 RX ORDER — PANTOPRAZOLE SODIUM 40 MG/1
40 TABLET, DELAYED RELEASE ORAL
Status: DISCONTINUED | OUTPATIENT
Start: 2020-05-01 | End: 2020-05-11 | Stop reason: HOSPADM

## 2020-05-01 RX ORDER — DULOXETIN HYDROCHLORIDE 60 MG/1
120 CAPSULE, DELAYED RELEASE ORAL DAILY
Status: DISCONTINUED | OUTPATIENT
Start: 2020-05-01 | End: 2020-05-11 | Stop reason: HOSPADM

## 2020-05-01 RX ORDER — UREA 10 %
100 LOTION (ML) TOPICAL DAILY
Status: DISCONTINUED | OUTPATIENT
Start: 2020-05-01 | End: 2020-05-11 | Stop reason: HOSPADM

## 2020-05-01 RX ORDER — BISACODYL 5 MG
5 TABLET, DELAYED RELEASE (ENTERIC COATED) ORAL DAILY PRN
Status: DISCONTINUED | OUTPATIENT
Start: 2020-05-01 | End: 2020-05-11 | Stop reason: HOSPADM

## 2020-05-01 RX ORDER — ASPIRIN 81 MG/1
81 TABLET ORAL DAILY
Status: DISCONTINUED | OUTPATIENT
Start: 2020-05-01 | End: 2020-05-11 | Stop reason: HOSPADM

## 2020-05-01 RX ORDER — CARBIDOPA AND LEVODOPA 25; 100 MG/1; MG/1
2 TABLET ORAL 4 TIMES DAILY
Status: DISCONTINUED | OUTPATIENT
Start: 2020-05-01 | End: 2020-05-11 | Stop reason: HOSPADM

## 2020-05-01 RX ADMIN — HEPARIN SODIUM 5000 UNITS: 5000 INJECTION INTRAVENOUS; SUBCUTANEOUS at 18:45

## 2020-05-01 RX ADMIN — HEPARIN SODIUM 5000 UNITS: 5000 INJECTION INTRAVENOUS; SUBCUTANEOUS at 10:36

## 2020-05-01 RX ADMIN — CARBIDOPA AND LEVODOPA 2 TABLET: 25; 100 TABLET ORAL at 18:47

## 2020-05-01 RX ADMIN — ATORVASTATIN CALCIUM 40 MG: 40 TABLET, FILM COATED ORAL at 21:42

## 2020-05-01 RX ADMIN — SODIUM CHLORIDE, SODIUM LACTATE, POTASSIUM CHLORIDE, AND CALCIUM CHLORIDE 100 ML/HR: 600; 310; 30; 20 INJECTION, SOLUTION INTRAVENOUS at 14:10

## 2020-05-01 RX ADMIN — CARBIDOPA AND LEVODOPA 2 TABLET: 25; 100 TABLET ORAL at 08:54

## 2020-05-01 RX ADMIN — Medication 10 ML: at 14:00

## 2020-05-01 RX ADMIN — MAGNESIUM SULFATE HEPTAHYDRATE 2 G: 40 INJECTION, SOLUTION INTRAVENOUS at 10:33

## 2020-05-01 RX ADMIN — CARBIDOPA AND LEVODOPA 2 TABLET: 25; 100 TABLET ORAL at 21:41

## 2020-05-01 RX ADMIN — HYDRALAZINE HYDROCHLORIDE 25 MG: 25 TABLET, FILM COATED ORAL at 08:54

## 2020-05-01 RX ADMIN — HEPARIN SODIUM 5000 UNITS: 5000 INJECTION INTRAVENOUS; SUBCUTANEOUS at 03:56

## 2020-05-01 RX ADMIN — DULOXETINE HYDROCHLORIDE 120 MG: 60 CAPSULE, DELAYED RELEASE PELLETS ORAL at 08:54

## 2020-05-01 RX ADMIN — CARVEDILOL 25 MG: 12.5 TABLET, FILM COATED ORAL at 06:12

## 2020-05-01 RX ADMIN — CARVEDILOL 12.5 MG: 12.5 TABLET, FILM COATED ORAL at 20:37

## 2020-05-01 RX ADMIN — PANTOPRAZOLE SODIUM 40 MG: 40 TABLET, DELAYED RELEASE ORAL at 06:13

## 2020-05-01 RX ADMIN — CLOPIDOGREL BISULFATE 75 MG: 75 TABLET ORAL at 08:54

## 2020-05-01 RX ADMIN — ASPIRIN 81 MG: 81 TABLET, COATED ORAL at 08:54

## 2020-05-01 RX ADMIN — CILOSTAZOL 100 MG: 50 TABLET ORAL at 21:40

## 2020-05-01 RX ADMIN — ARIPIPRAZOLE 15 MG: 10 TABLET ORAL at 08:58

## 2020-05-01 RX ADMIN — DIAZEPAM 2 MG: 2 TABLET ORAL at 08:54

## 2020-05-01 RX ADMIN — Medication 10 ML: at 21:41

## 2020-05-01 RX ADMIN — CARBIDOPA AND LEVODOPA 2 TABLET: 25; 100 TABLET ORAL at 14:11

## 2020-05-01 RX ADMIN — DIAZEPAM 2 MG: 2 TABLET ORAL at 21:42

## 2020-05-01 RX ADMIN — DIAZEPAM 2 MG: 2 TABLET ORAL at 15:42

## 2020-05-01 RX ADMIN — VITAM B12 100 MCG: 100 TAB at 08:54

## 2020-05-01 RX ADMIN — SODIUM CHLORIDE, SODIUM LACTATE, POTASSIUM CHLORIDE, AND CALCIUM CHLORIDE 100 ML/HR: 600; 310; 30; 20 INJECTION, SOLUTION INTRAVENOUS at 21:40

## 2020-05-01 RX ADMIN — SODIUM CHLORIDE, SODIUM LACTATE, POTASSIUM CHLORIDE, AND CALCIUM CHLORIDE 100 ML/HR: 600; 310; 30; 20 INJECTION, SOLUTION INTRAVENOUS at 03:57

## 2020-05-01 RX ADMIN — ATORVASTATIN CALCIUM 40 MG: 40 TABLET, FILM COATED ORAL at 03:56

## 2020-05-01 RX ADMIN — HYDRALAZINE HYDROCHLORIDE 25 MG: 25 TABLET, FILM COATED ORAL at 18:46

## 2020-05-01 NOTE — CONSULTS
Neurology  Consult  Lisa Louise FNP-C  Neurology NP  [phone]    Patient: Jessica Loera MRN: [id number]  SSN: [id number]    YOB: 1940  Age: 67 y.o. Sex: female        Chief Complaint:Generalized weakness     Subjective:      Lizet Berry is a 67 y.o. female with an extensive past medical history of anxiety, A. fib, and coronary artery disease status post stents in 2015. Carotid stenosis. Chronic kidney disease. Cough. CVA, Parkinson's disease, diabetes, hypertension, high cholesterol, heart disease, presents the emergency department with generalized weakness. Neurology was consulted for progressive decline in lower extremities weakness. The patient reports since December she started to notice a decline in strength and gait. When asked to explain, she states ' I cant walk, it is hard for me to walk. She was last seen by Dr. Rula Walter on 12/ 12/2020, at which time Sinemet  mg was increased to 2 tabs TID an order for  [facility] for PT was placed. The patient was unable to complete because she had to pay for it. Dr. Rula Walter also ordered a repeat MRI of the c-spine which was not completed. Last MRI C-spine 2019 revealed multilevel degenerative changes seen with severe canal stenosis at C4-C5. She was seen by Dr. Eliezer Tyson who noted surgical intervention was contraindicated at that time given stroke and severe ICA stenosis, recommended f/u in 3 months. This was not done. July 13, 2019, she presented to the ED with similar symptoms ( walking difficulty, weakness in legs, with instability and fall tendency) at which time MRI revealed a small acute left basal ganglia. Based on notes symptoms never completely improved. Currently on ASA and Plavix. She is also Sinemet 2tab four times a day she reports compliance but has not noticed any improvement. Denies any focal deficits.     Past Medical History:   Diagnosis Date    Anxiety disorder     Atrial fibrillation (HCC)     CAD (coronary artery disease) 2007    stents, CABG x 3v    Carotid stenosis     Cervical stenosis of spinal canal 2019    Chronic kidney disease     Cough     CVA (cerebral vascular accident) ([facility] [address] 2019    left lacunar infarct at head of caudate    Depression     AND CHRONIC ANXIETY    Diabetes ([facility] [address]     GERD (gastroesophageal reflux disease)     High cholesterol     History of peptic ulcer     Bleeding ulcer with increased NSAID use    Hypertension     Left carotid stenosis 2019    s/p left CEA with Dr. Regino Yancey MI, old 2007    PUD (peptic ulcer disease)     Stroke ([facility] [address]     Tremor     Valvular heart disease      Past Surgical History:   Procedure Laterality Date    CARDIAC SURG PROCEDURE UNLIST      CABG X3 VESSEL    HX CAROTID ENDARTERECTOMY  2019    HX CORONARY ARTERY BYPASS GRAFT      HX TONSILLECTOMY  1963    TOTAL KNEE ARTHROPLASTY Right 2015      Family History   Problem Relation Age of Onset    Heart Attack Mother 72        Dec 81yo    Hypertension Mother     Other Father         Unknown    Parkinson's Disease Brother     Anesth Problems Neg Hx      Social History     Tobacco Use    Smoking status: Former Smoker     Packs/day: 0.25     Years: 5.00     Pack years: 1.25     Types: Cigarettes     Last attempt to quit:      Years since quittin.3    Smokeless tobacco: Never Used   Substance Use Topics    Alcohol use:  Yes     Alcohol/week: 0.0 standard drinks     Comment: Rare      Current Facility-Administered Medications   Medication Dose Route Frequency Provider Last Rate Last Dose    ARIPiprazole (ABILIFY) tablet 15 mg  15 mg Oral DAILY Nakia Grayson MD   15 mg at 20 0858    aspirin delayed-release tablet 81 mg  81 mg Oral DAILY Nakia Grayson MD   81 mg at 20 0854    atorvastatin (LIPITOR) tablet 40 mg  40 mg Oral QHS Nakia Grayson MD   40 mg at 20 0356    carbidopa-levodopa (SINEMET)  mg per tablet 2 Tab  2 Tab Oral QID Sandra Luna MD   2 Tab at 05/01/20 0854    carvediloL (COREG) tablet 25 mg  25 mg Oral BID WITH MEALS Nakia Grayson MD   25 mg at 05/01/20 0612    . PHARMACY TO SUBSTITUTE PER PROTOCOL (Reordered from: cilostazol (PLETAL PO))    Per Protocol Sandra Luna MD        clopidogreL (PLAVIX) tablet 75 mg  75 mg Oral DAILY AFTER BREAKFAST Nakia Grayson MD   75 mg at 05/01/20 0854    cyanocobalamin (VITAMIN B12) tablet 100 mcg  100 mcg Oral DAILY Nakia Tony MD   100 mcg at 05/01/20 0854    diazePAM (VALIUM) tablet 2 mg  2 mg Oral TID Jeronimo HENRY MD   2 mg at 05/01/20 0854    DULoxetine (CYMBALTA) capsule 120 mg  120 mg Oral DAILY Nakia Grayson MD   120 mg at 05/01/20 0854    hydrALAZINE (APRESOLINE) tablet 25 mg  25 mg Oral BID Nakia Grayson MD   25 mg at 05/01/20 0854    pantoprazole (PROTONIX) tablet 40 mg  40 mg Oral ACB Nakia Grayson MD   40 mg at 05/01/20 8807    nitroglycerin (NITROSTAT) tablet 0.4 mg  0.4 mg SubLINGual Q5MIN PRN Nakia Grayson MD        sodium chloride (NS) flush 5-40 mL  5-40 mL IntraVENous Q8H Nakia Grayson MD        sodium chloride (NS) flush 5-40 mL  5-40 mL IntraVENous PRN Nakia Grayson MD        ondansetron (ZOFRAN) injection 4 mg  4 mg IntraVENous Q4H PRN Nakia Grayson MD        bisacodyL (DULCOLAX) tablet 5 mg  5 mg Oral DAILY PRN Nakia Grayson MD        heparin (porcine) injection 5,000 Units  5,000 Units SubCUTAneous Q8H Nakia Grayson MD   5,000 Units at 05/01/20 1036    acetaminophen (TYLENOL) tablet 650 mg  650 mg Oral Q4H PRN Nakia Grayson MD        lactated Ringers infusion  100 mL/hr IntraVENous CONTINUOUS Nakia Grayson  mL/hr at 05/01/20 0357 100 mL/hr at 05/01/20 0357        No Known Allergies    Review of Systems:  Musculoskeletal:positive for stiff joints, neck pain, back pain and muscle weakness  Neurological: negative except for gait problems and weakness  Behavioral/Psychiatric: negative except for depression      Objective:     Vitals:    05/01/20 0457 05/01/20 0612 05/01/20 0852 05/01/20 0947   BP: 168/80  168/71    Pulse: (!) 119 62 (!) 52    Resp: 20  18    Temp: 98.5 °F (36.9 °C)  97.8 °F (36.6 °C)    SpO2: 97%  98%    Weight:    73.8 kg (162 lb 9.6 oz)   Height:    5' 5\" (1.651 m)         General: Well developed, masked facies    Head: Normocephalic, atraumatic, anicteric sclera   Lungs:  Clear to auscultation bilaterally, No wheezes or rubs   Cardiac: Regular rate and rhythm with no murmurs. Abd: Bowel sounds were audible. No tenderness on palpation   Ext: No pedal edema   Skin: Reds spots on lower extremities       Neurological Exam:  Mental Status: Alert and oriented to person place and time   Speech: Fluent no aphasia or dysarthria. Cranial Nerves:   Intact visual fields ( slight difficulty on the left). Facial sensation is normal. Facial movement is symmetric. Palate is midline. Normal sternocleidomastoid strength. Tongue is midline. Hearing is intact bilaterally. Eyes: PERRL, EOM's full, no nystagmus, no ptosis. Motor:  Full and symmetric strength of upper  proximal and distal muscles ,BLE 4/5 . Normal bulk and tone. Dysdiadochokinesia with NACHO bilaterally, dysmetria with HTS   Reflexes:   Deep tendon reflexes 2+/4 and symmetrical.  Plantar response is downgoing b/l. Sensory:   Symmetrically intact  with no perceived deficits modalities involving small or large fibers. Gait:   did not assess, No assistance    Tremor:   Slight resting tremor. Notable tremors bilateral leg with action ( nervousness)    Cerebellar:  Left side ataxia . Neurovascular: No carotid bruits.          Recent Results (from the past 24 hour(s))   METABOLIC PANEL, COMPREHENSIVE    Collection Time: 04/30/20  7:10 PM   Result Value Ref Range    Sodium 136 136 - 145 mmol/L    Potassium 4.6 3.5 - 5.1 mmol/L    Chloride 105 97 - 108 mmol/L    CO2 25 21 - 32 mmol/L    Anion gap 6 5 - 15 mmol/L Glucose 153 (H) 65 - 100 mg/dL    BUN 32 (H) 6 - 20 MG/DL    Creatinine 1.86 (H) 0.55 - 1.02 MG/DL    BUN/Creatinine ratio 17 12 - 20      GFR est AA 32 (L) >60 ml/min/1.73m2    GFR est non-AA 26 (L) >60 ml/min/1.73m2    Calcium 10.5 (H) 8.5 - 10.1 MG/DL    Bilirubin, total 0.9 0.2 - 1.0 MG/DL    ALT (SGPT) 12 12 - 78 U/L    AST (SGOT) 33 15 - 37 U/L    Alk. phosphatase 96 45 - 117 U/L    Protein, total 6.6 6.4 - 8.2 g/dL    Albumin 3.5 3.5 - 5.0 g/dL    Globulin 3.1 2.0 - 4.0 g/dL    A-G Ratio 1.1 1.1 - 2.2     TROPONIN I    Collection Time: 04/30/20  7:10 PM   Result Value Ref Range    Troponin-I, Qt. <0.05 <0.05 ng/mL   SAMPLES BEING HELD    Collection Time: 04/30/20  7:10 PM   Result Value Ref Range    SAMPLES BEING HELD 1RED,1BLU     COMMENT        Add-on orders for these samples will be processed based on acceptable specimen integrity and analyte stability, which may vary by analyte.    EKG, 12 LEAD, INITIAL    Collection Time: 04/30/20  7:29 PM   Result Value Ref Range    Ventricular Rate 58 BPM    Atrial Rate 58 BPM    P-R Interval 158 ms    QRS Duration 100 ms    Q-T Interval 466 ms    QTC Calculation (Bezet) 457 ms    Calculated P Axis 9 degrees    Calculated R Axis -49 degrees    Calculated T Axis 117 degrees    Diagnosis       Sinus bradycardia  Left anterior fascicular block  Voltage criteria for left ventricular hypertrophy  T wave abnormality, consider lateral ischemia  When compared with ECG of 18-MAR-2020 08:31,  T wave inversion more evident in Lateral leads     URINALYSIS W/MICROSCOPIC    Collection Time: 04/30/20  7:57 PM   Result Value Ref Range    Color DARK YELLOW      Appearance CLEAR CLEAR      Specific gravity 1.025 1.003 - 1.030      pH (UA) 5.0 5.0 - 8.0      Protein 100 (A) NEG mg/dL    Glucose Negative NEG mg/dL    Ketone 15 (A) NEG mg/dL    Blood Negative NEG      Urobilinogen 1.0 0.2 - 1.0 EU/dL    Nitrites Negative NEG      Leukocyte Esterase Negative NEG      WBC 0-4 0 - 4 /hpf RBC 0-5 0 - 5 /hpf    Epithelial cells FEW FEW /lpf    Bacteria Negative NEG /hpf    Hyaline cast 2-5 0 - 5 /lpf   URINE CULTURE HOLD SAMPLE    Collection Time: 04/30/20  7:57 PM   Result Value Ref Range    Urine culture hold        Urine on hold in Microbiology dept for 2 days. If unpreserved urine is submitted, it cannot be used for addtional testing after 24 hours, recollection will be required. CBC WITH AUTOMATED DIFF    Collection Time: 04/30/20  7:57 PM   Result Value Ref Range    WBC 5.5 3.6 - 11.0 K/uL    RBC 3.69 (L) 3.80 - 5.20 M/uL    HGB 11.1 (L) 11.5 - 16.0 g/dL    HCT 34.2 (L) 35.0 - 47.0 %    MCV 92.7 80.0 - 99.0 FL    MCH 30.1 26.0 - 34.0 PG    MCHC 32.5 30.0 - 36.5 g/dL    RDW 13.9 11.5 - 14.5 %    PLATELET 654 004 - 817 K/uL    MPV 9.1 8.9 - 12.9 FL    NRBC 0.0 0  WBC    ABSOLUTE NRBC 0.00 0.00 - 0.01 K/uL    NEUTROPHILS 61 32 - 75 %    LYMPHOCYTES 24 12 - 49 %    MONOCYTES 12 5 - 13 %    EOSINOPHILS 2 0 - 7 %    BASOPHILS 1 0 - 1 %    IMMATURE GRANULOCYTES 0 0.0 - 0.5 %    ABS. NEUTROPHILS 3.4 1.8 - 8.0 K/UL    ABS. LYMPHOCYTES 1.3 0.8 - 3.5 K/UL    ABS. MONOCYTES 0.6 0.0 - 1.0 K/UL    ABS. EOSINOPHILS 0.1 0.0 - 0.4 K/UL    ABS. BASOPHILS 0.1 0.0 - 0.1 K/UL    ABS. IMM. GRANS. 0.0 0.00 - 0.04 K/UL    DF AUTOMATED     BILIRUBIN, CONFIRM    Collection Time: 04/30/20  7:57 PM   Result Value Ref Range    Bilirubin UA, confirm Negative NEG     SAMPLES BEING HELD    Collection Time: 04/30/20  8:10 PM   Result Value Ref Range    SAMPLES BEING HELD 1 blue, 1 red, 1 pst     COMMENT        Add-on orders for these samples will be processed based on acceptable specimen integrity and analyte stability, which may vary by analyte.    SARS-COV-2    Collection Time: 04/30/20 10:57 PM   Result Value Ref Range    Specimen source Nasopharyngeal      SARS-CoV-2 PENDING    METABOLIC PANEL, COMPREHENSIVE    Collection Time: 05/01/20  6:37 AM   Result Value Ref Range    Sodium 138 136 - 145 mmol/L    Potassium 3.8 3.5 - 5.1 mmol/L    Chloride 108 97 - 108 mmol/L    CO2 24 21 - 32 mmol/L    Anion gap 6 5 - 15 mmol/L    Glucose 153 (H) 65 - 100 mg/dL    BUN 28 (H) 6 - 20 MG/DL    Creatinine 1.41 (H) 0.55 - 1.02 MG/DL    BUN/Creatinine ratio 20 12 - 20      GFR est AA 44 (L) >60 ml/min/1.73m2    GFR est non-AA 36 (L) >60 ml/min/1.73m2    Calcium 9.5 8.5 - 10.1 MG/DL    Bilirubin, total 0.5 0.2 - 1.0 MG/DL    ALT (SGPT) 15 12 - 78 U/L    AST (SGOT) 28 15 - 37 U/L    Alk. phosphatase 88 45 - 117 U/L    Protein, total 5.6 (L) 6.4 - 8.2 g/dL    Albumin 2.9 (L) 3.5 - 5.0 g/dL    Globulin 2.7 2.0 - 4.0 g/dL    A-G Ratio 1.1 1.1 - 2.2     LIPID PANEL    Collection Time: 05/01/20  6:37 AM   Result Value Ref Range    LIPID PROFILE          Cholesterol, total 129 <200 MG/DL    Triglyceride 92 <150 MG/DL    HDL Cholesterol 36 MG/DL    LDL, calculated 74.6 0 - 100 MG/DL    VLDL, calculated 18.4 MG/DL    CHOL/HDL Ratio 3.6 0.0 - 5.0     CBC WITH AUTOMATED DIFF    Collection Time: 05/01/20  6:37 AM   Result Value Ref Range    WBC 4.6 3.6 - 11.0 K/uL    RBC 3.41 (L) 3.80 - 5.20 M/uL    HGB 10.3 (L) 11.5 - 16.0 g/dL    HCT 31.1 (L) 35.0 - 47.0 %    MCV 91.2 80.0 - 99.0 FL    MCH 30.2 26.0 - 34.0 PG    MCHC 33.1 30.0 - 36.5 g/dL    RDW 13.6 11.5 - 14.5 %    PLATELET 135 710 - 954 K/uL    MPV 9.1 8.9 - 12.9 FL    NRBC 0.0 0  WBC    ABSOLUTE NRBC 0.00 0.00 - 0.01 K/uL    NEUTROPHILS 71 32 - 75 %    LYMPHOCYTES 18 12 - 49 %    MONOCYTES 9 5 - 13 %    EOSINOPHILS 2 0 - 7 %    BASOPHILS 0 0 - 1 %    IMMATURE GRANULOCYTES 0 0.0 - 0.5 %    ABS. NEUTROPHILS 3.3 1.8 - 8.0 K/UL    ABS. LYMPHOCYTES 0.8 0.8 - 3.5 K/UL    ABS. MONOCYTES 0.4 0.0 - 1.0 K/UL    ABS. EOSINOPHILS 0.1 0.0 - 0.4 K/UL    ABS. BASOPHILS 0.0 0.0 - 0.1 K/UL    ABS. IMM.  GRANS. 0.0 0.00 - 0.04 K/UL    DF AUTOMATED     TSH 3RD GENERATION    Collection Time: 05/01/20  6:37 AM   Result Value Ref Range    TSH 0.53 0.36 - 3.74 uIU/mL   MAGNESIUM    Collection Time: 05/01/20  6:37 AM   Result Value Ref Range    Magnesium 1.6 1.6 - 2.4 mg/dL   PHOSPHORUS    Collection Time: 05/01/20  6:37 AM   Result Value Ref Range    Phosphorus 2.9 2.6 - 4.7 MG/DL   TROPONIN I    Collection Time: 05/01/20  6:37 AM   Result Value Ref Range    Troponin-I, Qt. 1.11 (H) <0.05 ng/mL   TROPONIN I    Collection Time: 05/01/20  9:55 AM   Result Value Ref Range    Troponin-I, Qt. 1.81 (H) <0.05 ng/mL       Imaging:  CT Results (most recent):  Results from Hospital Encounter encounter on 02/24/20   CTA NECK    Narrative INDICATION: Carotid stenosis. COMPARISON: 7/12/2019. Contrast-enhanced CT angiogram head and neck performed using 100 cc Isovue-370. Three-dimensional postprocessing was performed. CT dose reduction was achieved through use of a standardized protocol tailored  for this examination and are automatic exposure control for dose modulation dose  reduction    NECK:    The origins of the great vessels are patent. The right vertebral artery is  widely patent and dominant. The left vertebral artery is diminutive in caliber  but grossly patent. There is been interval carotid endarterectomy on the left left. However, there  is persistent severe stenosis in the proximal left internal carotid  approximately at least 80% using NASCET criteria. There is calcification of the  distal left internal carotid artery. On the right there is heavy calcification at the carotid bifurcation making  quantification of stenosis somewhat difficult. There is probably approximately  60% stenosis at the origin of the right internal carotid artery. There are  calcifications of the distal right internal carotid artery at the level of C1  with approximately 50-60% stenosis distally. Head:    Major intracranial vessels are patent. No large vessel occlusion or intracranial  aneurysm. No evidence for intracranial hemorrhage or acute stroke.  The  underlying brain demonstrates chronic ischemic change in the white matter. Impression IMPRESSION:  1. Patient status post left carotid endarterectomy. However, there is persistent  severe stenosis of the proximal left internal carotid measuring at least 80%  using NASCET criteria. 2. Moderate proximally 60% stenosis in proximal right internal carotid artery. There is additional calcification and at least moderate stenosis of the distal  right internal carotid artery as well. 3. No acute abnormality. Assessment:     Hospital Problems  Date Reviewed: 5/1/2020          Codes Class Noted POA    * (Principal) Generalized weakness ICD-10-CM: R53.1  ICD-9-CM: 780.79  4/30/2020 Yes           Pt is a 67 y.o. right handed female with DM, CAD, HTN, HLD, and afib who presented to the ED with gradually progressive weakness in lower extremities and pain. This seems to be chronic has been going on now since July with no improvement. Per patient gait is shuffled and stiff and she is having a hard time getting out of a chair. Pt's exam has masked facies, left resting tremor, no action tremor UE, Action tremor in BLE, dysdiadochokinesia with NACHO bilaterally, dysmetria with HTS. No focal deficits. Symptoms seem to be related to her  Parkinson and being deconditioned due multiple issues (COVID19, renal insufficiency,DM2,No recent therapy, etc).   Plan:   1.)Parkinson symptoms worsening ( Unsteady gait and falls)   - Continue Sinemet  mg four time a day    -  Recommend OT/PT   - She will need Rehab    - follow up outpatient neurology after rehab    - supportive care   - She live alone, she may need assistance in her home with meals and ADL   2.) Lower back pain   - chronic   -PT/OT   3.) Elevated Troponin   -from a neurological standpoint patient is okay for cardiac cath   4.)Fall   - due to #1   - Agree with CT of the pelvis due to her  Falls 2 weeks ago and now left side pain   5.)Severe canal stenosis    -  If  Covid neg, order MRI of the C-spine r/o myelopathy   6.)Severe ICA stenosis   -CTA neck 2/2020 moderate to severe right and left ICA stenosis. - Plans  for revascularization of the left carotid artery.  The procedure was postponed because of the  COVID-19 pandemic    -continue ASA  And Plavix, statin       Thank you for allowing the Neurology Service the pleasure of participating in the care of your patient. This patient will be discussed with my collaborating care team physician  and he may have further recommendations regarding this patient's care.      Signed By: Tin Zaman NP     May 1, 2020

## 2020-05-01 NOTE — PROGRESS NOTES
Problem: Pressure Injury - Risk of  Goal: *Prevention of pressure injury  Description: Document Gregorio Scale and appropriate interventions in the flowsheet. Outcome: Progressing Towards Goal  Note: Pressure Injury Interventions:  Sensory Interventions: Assess changes in LOC, Check visual cues for pain, Float heels, Keep linens dry and wrinkle-free, Maintain/enhance activity level, Turn and reposition approx. every two hours (pillows and wedges if needed)    Moisture Interventions: Absorbent underpads, Internal/External urinary devices, Minimize layers, Limit adult briefs, Check for incontinence Q2 hours and as needed    Activity Interventions: Increase time out of bed, Pressure redistribution bed/mattress(bed type)    Mobility Interventions: Float heels, Pressure redistribution bed/mattress (bed type), Turn and reposition approx. every two hours(pillow and wedges)    Nutrition Interventions: Document food/fluid/supplement intake, Offer support with meals,snacks and hydration    Friction and Shear Interventions: Lift sheet, Minimize layers       Problem: General Medical Care Plan  Goal: *Vital signs within specified parameters  Outcome: Progressing Towards Goal  Visit Vitals  /48 (BP 1 Location: Left arm, BP Patient Position: At rest)   Pulse 62   Temp 98.5 °F (36.9 °C)   Resp 27   Ht 5' 5\" (1.651 m)   Wt 73.8 kg (162 lb 9.6 oz)   SpO2 100%   BMI 27.06 kg/m²        Bedside shift change report given to Janet Ferro RN (oncoming nurse) by Jordon Kaminski RN (offgoing nurse). Report included the following information SBAR, Kardex, ED Summary, Intake/Output, MAR, Accordion and Recent Results.

## 2020-05-01 NOTE — H&P
[address]  HISTORY AND PHYSICAL    Name:  Sarah Mann  MR#:  [id number]  :  1940  ACCOUNT #:  [de-identified]  ADMIT DATE:  2020      The patient was seen, evaluated and admitted by me on 2020. PRIMARY CARE PHYSICIAN:  Payton Huber DO    SOURCE OF INFORMATION:  The patient. CHIEF COMPLAINT:  Weakness. HISTORY OF PRESENT ILLNESS:  This is a 61-year-old woman with a past medical history significant for Parkinson's disease; coronary artery disease, status post stent placement; hypertension; dyslipidemia; anxiety/depression; chronic kidney disease; left carotid artery stenosis, status post endarterectomy; status post CVA, was in her usual state of health until a couple of weeks ago when the patient developed weakness. The patient described the weakness as generalized weakness, progressive and getting worse. The patient is unable to walk. The patient gets meal delivery by [employer] once a day. The patient stated that she has no appetite and she has not been drinking. She also complained of cough, which is nonproductive, not associated with fever, rigors, or chills. As a result of the weakness, the patient fell at home a couple of times. The last fall was about 2 weeks ago. Complaining of generalized body aches and pain as well as discomfort in her neck. The patient was last admitted to this hospital from 2019 to 2019. The patient was admitted and treated for acute CVA. The patient has left carotid artery stenosis, underwent endarterectomy in the past.  According to the patient, the left carotid artery is now blocked again and the patient is awaiting revascularization of the left carotid artery. The procedure was postponed because of the COVID-19 pandemic. When the patient arrived at the emergency room, chest x-ray was obtained. The chest x-ray was without any acute findings.   The patient was not able to ambulate, was referred to the hospitalist service for evaluation for admission. There was also worsening of renal function. PAST MEDICAL HISTORY:  Parkinson's disease, coronary artery disease, hypertension, dyslipidemia, anxiety disorder, chronic kidney disease, left carotid artery stenosis, status post CVA, chronic atrial fibrillation. ALLERGIES:  NO KNOWN DRUG ALLERGIES. MEDICATIONS:  Abilify 15 mg daily, aspirin 81 mg daily, Sinemet 25/100 two tablets four times daily, Coreg 12.5 mg 2 tablets twice daily, Plavix 75 mg daily, Valium 2 mg three times daily, Cymbalta 60 mg 2 tablets daily, hydralazine 25 mg twice daily, Vistaril 25 mg three times daily as needed for itching, Ambien 10 mg daily as needed for sleep. FAMILY HISTORY:  This was reviewed. Mother had heart disease and hypertension. PAST SURGICAL HISTORY:  This is significant for CABG, left carotid endarterectomy, tonsillectomy, right total knee arthroplasty. SOCIAL HISTORY:  No history of alcohol or tobacco abuse. REVIEW OF SYSTEMS:  HEAD, EYES, EARS, NOSE AND THROAT:  No headache, no dizziness, no blurring of vision, no photophobia. RESPIRATORY SYSTEM:  This is positive for cough. No shortness of breath, no hemoptysis. CARDIOVASCULAR SYSTEM:  No chest pain, no orthopnea, no palpitation. GASTROINTESTINAL SYSTEM:  No nausea or vomiting. No diarrhea. No constipation. GENITOURINARY SYSTEM:  No dysuria, no urgency and no frequency. All other systems are reviewed and they are negative. PHYSICAL EXAMINATION:  GENERAL APPEARANCE:  The patient appeared ill, in moderate distress. VITAL SIGNS:  On arrival at the emergency room, temperature 98.2, pulse 60, respiratory rate 19, blood pressure 126/49, oxygen saturation 97% on room air. HEENT:  Head:  Normocephalic, atraumatic. Eyes:  Normal eye movement. No redness, no drainage, no discharge. Ears:  Normal external ears with no evidence of drainage. Nose:  No deformity and no drainage.   Mouth and Throat:  No visible oral lesion. NECK:  Neck is supple. No JVD, no thyromegaly. CHEST:  Clear breath sounds. No wheezing, no crackles. HEART:  Normal S1 and S2, regular. No clinically appreciable murmur. ABDOMEN:  Soft, nontender. Normal bowel sounds. CNS:  Alert and oriented x3. No gross focal neurological deficit. EXTREMITIES:  No edema. Pulses 2+ bilaterally. MUSCULOSKELETAL SYSTEM:  No evidence of joint deformity or swelling. SKIN:  No active skin lesions seen in the exposed part of the body. PSYCHIATRY:  Normal mood and affect. LYMPHATIC SYSTEM:  No cervical lymphadenopathy. DIAGNOSTIC DATA:  EKG shows sinus bradycardia, left ventricular hypertrophy and nonspecific ST and T-wave abnormalities. Chest x-ray, no acute findings. Cardiac profile:   Troponin less than 0.05. Chemistry:  Sodium 136, potassium 4.6, chloride 105, CO2 25, glucose 153, BUN 32, creatinine 1.86, calcium 10.5, total bilirubin 0.9, ALT 12, AST 33, alkaline phosphatase 96, total protein 6.6, albumin level 3.5, globulin 3.1. Hematology:  WBC 5.5, hemoglobin 11.1, hematocrit 34.2, platelets 807. Urinalysis: This is significant for negative nitrite, negative leukocyte esterase, negative bacteria. ASSESSMENT:  1. Generalized weakness. 2.  Fall at home. 3.  Parkinson's disease. 4.  Coronary artery disease, status post coronary artery bypass graft. 5.  Hypertension. 6.  Dyslipidemia. 7.  Anxiety/depression. 8.  Hyperglycemia. 9.  Acute-on-chronic kidney disease, stage IV.  10.  Hypercalcemia. 11.  Left carotid artery stenosis. 12.  Suspected COVID-19 virus infection. 13.  Paroxysmal atrial fibrillation. PLAN:  1. Generalized weakness. We will admit the patient for further evaluation and treatment. We will obtain CT scan of the head to rule out acute pathology. The patient has had multiple falls at home. We will also obtain an MRI of the brain to evaluate the patient for stroke.   This patient has had a stroke last year and the patient is at significant risk of having another stroke because of her significant left carotid artery stenosis. The patient will most likely require PT, OT evaluation and treatment after the CT scan of the head and MRI. We will check TSH level. 2.  Fall at home. The patient had multiple falls at home. She is complaining of neck discomfort. We will obtain CT scan of the cervical spine and CT scan of the pelvis to evaluate the patient for fracture. 3.  Parkinson's disease. We will continue with preadmission medication. Neurology consult will be requested to assist in further evaluation and treatment. 4.  Coronary artery disease, status post CABG. We will resume preadmission cardiac medication. We will check cardiac markers to rule out acute myocardial infarction. 5.  Hypertension. We will resume home medication and monitor the patient's blood pressure closely. 6.  Dyslipidemia. We will continue with preadmission medication. 7.  Anxiety/depression. We will resume home medication. 8.  Hyperglycemia. We will check hemoglobin A1c level. The patient has no history of diabetes. 9.  Acute-on-chronic kidney disease, stage IV. We will carry out fluid therapy and repeat the patient's renal function. If there is no improvement with hydration, the patient may require Nephrology consult as well as renal ultrasound. 10.  Hypercalcemia. We will carry out fluid therapy and repeat calcium level. 11.  Left carotid artery stenosis. The patient is status post endarterectomy. The patient is now awaiting revascularization of the left carotid artery because of the occlusion. This put the patient at significant risk for stroke that is why the MRI of the brain has been obtained. 12.  Suspected COVID-19 virus infection. The patient also presented with cough. The patient will be placed on droplet plus isolation and test for COVID has been sent. We will await test results.   13.  Paroxysmal atrial fibrillation. The patient went into atrial fibrillation when she arrived on the floor. The patient was placed on remote telemetry. We will check TSH level. We will obtain echocardiogram.  Cardiology consult will be requested to assist in further evaluation and treatment. OTHER ISSUES:  Code status: The patient is a full code. We will place the patient on heparin for DVT prophylaxis. FUNCTIONAL STATUS PRIOR TO ADMISSION:  The patient came from home. The patient is ambulatory with a walker. COVID PRECAUTION:  The patient was wearing a mask. I was wearing cap, goggle, gown, gloves, and an 1870 face mask for this patient's encounter.         Trenia Harada, MD [id number]  D:  05/01/2020 5:57  T:  05/01/2020 7:16  JOB #:  [id number]  CC:  Nora Torres DO

## 2020-05-01 NOTE — PROGRESS NOTES
[address] [facility]  Hospitalist Group                                                                                          Hospitalist Progress Note  Ewa Pascual MD  Answering service: [phone] from in house phone        Date of Service:  2020  NAME:  Janneth Collins  :  1940  MRN:  [id number]      Admission Summary: This is a 71-year-old woman with a past medical history significant for Parkinson's disease; coronary artery disease, status post stent placement; hypertension; dyslipidemia; anxiety/depression; chronic kidney disease; left carotid artery stenosis, status post endarterectomy; status post CVA, was in her usual state of health until a couple of weeks ago when the patient developed weakness. The patient described the weakness as generalized weakness, progressive and getting worse. The patient is unable to walk. The patient gets meal delivery by [employer] once a day. The patient stated that she has no appetite and she has not been drinking. She also complained of cough, which is nonproductive, not associated with fever, rigors, or chills. As a result of the weakness, the patient fell at home a couple of times. The last fall was about 2 weeks ago. Complaining of generalized body aches and pain as well as discomfort in her neck. The patient was last admitted to this hospital from 2019 to 2019. The patient was admitted and treated for acute CVA. The patient has left carotid artery stenosis, underwent endarterectomy in the past.  According to the patient, the left carotid artery is now blocked again and the patient is awaiting revascularization of the left carotid artery. The procedure was postponed because of the COVID-19 pandemic. When the patient arrived at the emergency room, chest x-ray was obtained. The chest x-ray was without any acute findings.   The patient was not able to ambulate, was referred to the hospitalist service for evaluation for admission. There was also worsening of renal function.       Interval history / Subjective:     F/u Generalized weakness/multiple falls  Admitted this am  Not complaining of chest pain, SOB, headache, dizziness     Assessment & Plan:     Generalized weakness/multiple falls  -sec to ? CVA vs progression of Parkinson's  -CT head  -MRI/MRA head once COVID negative  -Awaiting Neurology  -PT/OT    Parkinson's disease  -Continue home meds    Possible NSTEMI  -History of Coronary artery disease, status post CABG  -noted EKG changes, elevated troponins  -Discussed with Dr Rosa Holbrook on phone, no changes. Follow Cardiology    MORENO over CKD stage IV  -improving    Hypertension  -Continue home meds    Dyslipidemia  -home meds    Anxiety/depression  -stable    Left carotid artery stenosis. -s/p endarterectomy.    -The patient is now awaiting revascularization of the left carotid artery because of the occlusion. -CTA neck 2/2020 moderate to severe right and left ICA stenosis. Hypercalcemia.    -Continue fluids    Suspected COVID-19 virus infection  -CXR 4/30 No acute findings  -follow results  -droplet plus    Paroxysmal atrial fibrillation  -Follow cardiology     Cardiac diet    Code status: FULL CODE  DVT prophylaxis: heparin    Care Plan discussed with: Patient/Family  Anticipated Disposition: Home w/Family  Anticipated Discharge: 24 hours to 48 hours     Hospital Problems  Date Reviewed: 5/1/2020          Codes Class Noted POA    * (Principal) Generalized weakness ICD-10-CM: R53.1  ICD-9-CM: 780.79  4/30/2020 Yes                Review of Systems:   A comprehensive review of systems was negative except for that written in the HPI. Vital Signs:    Last 24hrs VS reviewed since prior progress note.  Most recent are:  Visit Vitals  /71 (BP 1 Location: Left arm, BP Patient Position: At rest)   Pulse (!) 52   Temp 97.8 °F (36.6 °C)   Resp 18   SpO2 98%       No intake or output data in the 24 hours ending 05/01/20 0920     Physical Examination:             Constitutional:  No acute distress, cooperative, pleasant    ENT:  Oral mucosa moist, oropharynx benign. Resp:  CTA bilaterally. No wheezing/rhonchi/rales. No accessory muscle use   CV:  Regular rhythm, normal rate, no murmurs, gallops, rubs    GI:  Soft, non distended, non tender. normoactive bowel sounds, no hepatosplenomegaly     Musculoskeletal:  No edema, warm, 2+ pulses throughout    Neurologic:  Moves all extremities. AAOx3, CN II-XII reviewed     Skin:  Good turgor, no rashes or ulcers       Data Review:    Review and/or order of clinical lab test      Labs:     Recent Labs     05/01/20 0637 04/30/20 1957   WBC 4.6 5.5   HGB 10.3* 11.1*   HCT 31.1* 34.2*    207     Recent Labs     05/01/20 0637 04/30/20 1910    136   K 3.8 4.6    105   CO2 24 25   BUN 28* 32*   CREA 1.41* 1.86*   * 153*   CA 9.5 10.5*   MG 1.6  --    PHOS 2.9  --      Recent Labs     05/01/20 0637 04/30/20 1910   SGOT 28 33   ALT 15 12   AP 88 96   TBILI 0.5 0.9   TP 5.6* 6.6   ALB 2.9* 3.5   GLOB 2.7 3.1     No results for input(s): INR, PTP, APTT, INREXT in the last 72 hours. No results for input(s): FE, TIBC, PSAT, FERR in the last 72 hours. No results found for: FOL, RBCF   No results for input(s): PH, PCO2, PO2 in the last 72 hours.   Recent Labs     05/01/20 0637 04/30/20 1910   TROIQ 1.11* <0.05     Lab Results   Component Value Date/Time    Cholesterol, total 129 05/01/2020 06:37 AM    HDL Cholesterol 36 05/01/2020 06:37 AM    LDL, calculated 74.6 05/01/2020 06:37 AM    Triglyceride 92 05/01/2020 06:37 AM    CHOL/HDL Ratio 3.6 05/01/2020 06:37 AM     Lab Results   Component Value Date/Time    Glucose (POC) 139 (H) 07/26/2019 04:34 PM    Glucose (POC) 126 (H) 07/26/2019 11:01 AM    Glucose (POC) 94 07/12/2019 05:40 PM    Glucose (POC) 135 (H) 02/17/2015 06:46 AM    Glucose (POC) 129 (H) 02/16/2015 09:58 PM     Lab Results   Component Value Date/Time    Color DARK YELLOW 04/30/2020 07:57 PM    Appearance CLEAR 04/30/2020 07:57 PM    Specific gravity 1.025 04/30/2020 07:57 PM    pH (UA) 5.0 04/30/2020 07:57 PM    Protein 100 (A) 04/30/2020 07:57 PM    Glucose Negative 04/30/2020 07:57 PM    Ketone 15 (A) 04/30/2020 07:57 PM    Bilirubin NEGATIVE  07/11/2019 10:17 AM    Urobilinogen 1.0 04/30/2020 07:57 PM    Nitrites Negative 04/30/2020 07:57 PM    Leukocyte Esterase Negative 04/30/2020 07:57 PM    Epithelial cells FEW 04/30/2020 07:57 PM    Bacteria Negative 04/30/2020 07:57 PM    WBC 0-4 04/30/2020 07:57 PM    RBC 0-5 04/30/2020 07:57 PM         Medications Reviewed:     Current Facility-Administered Medications   Medication Dose Route Frequency    ARIPiprazole (ABILIFY) tablet 15 mg  15 mg Oral DAILY    aspirin delayed-release tablet 81 mg  81 mg Oral DAILY    atorvastatin (LIPITOR) tablet 40 mg  40 mg Oral QHS    carbidopa-levodopa (SINEMET)  mg per tablet 2 Tab  2 Tab Oral QID    carvediloL (COREG) tablet 25 mg  25 mg Oral BID WITH MEALS    . PHARMACY TO SUBSTITUTE PER PROTOCOL (Reordered from: cilostazol (PLETAL PO))    Per Protocol    clopidogreL (PLAVIX) tablet 75 mg  75 mg Oral DAILY AFTER BREAKFAST    cyanocobalamin (VITAMIN B12) tablet 100 mcg  100 mcg Oral DAILY    diazePAM (VALIUM) tablet 2 mg  2 mg Oral TID    DULoxetine (CYMBALTA) capsule 120 mg  120 mg Oral DAILY    hydrALAZINE (APRESOLINE) tablet 25 mg  25 mg Oral BID    pantoprazole (PROTONIX) tablet 40 mg  40 mg Oral ACB    nitroglycerin (NITROSTAT) tablet 0.4 mg  0.4 mg SubLINGual Q5MIN PRN    sodium chloride (NS) flush 5-40 mL  5-40 mL IntraVENous Q8H    sodium chloride (NS) flush 5-40 mL  5-40 mL IntraVENous PRN    ondansetron (ZOFRAN) injection 4 mg  4 mg IntraVENous Q4H PRN    bisacodyL (DULCOLAX) tablet 5 mg  5 mg Oral DAILY PRN    heparin (porcine) injection 5,000 Units  5,000 Units SubCUTAneous Q8H    acetaminophen (TYLENOL) tablet 650 mg  650 mg Oral Q4H PRN    lactated Ringers infusion  100 mL/hr IntraVENous CONTINUOUS     ______________________________________________________________________  EXPECTED LENGTH OF STAY: - - -  ACTUAL LENGTH OF STAY:          Rob Cook MD

## 2020-05-01 NOTE — PROGRESS NOTES
Bedside shift change report given to [address] (oncoming nurse) by Francisco Camara RN (offgoing nurse). Report included the following information SBAR, Kardex, MAR and Recent Results.

## 2020-05-01 NOTE — PROGRESS NOTES
TRANSFER - IN REPORT:    Verbal report received from  [address] (name) on Nayla Navas  being received from (unit) for routine progression of care      Report consisted of patients Situation, Background, Assessment and   Recommendations(SBAR). Information from the following report(s) SBAR, Kardex, Intake/Output, MAR, Accordion, Recent Results and Cardiac Rhythm afib was reviewed with the receiving nurse. Opportunity for questions and clarification was provided. Assessment completed upon patients arrival to unit and care assumed.

## 2020-05-01 NOTE — PROGRESS NOTES
Orders received, chart reviewed and patient is currently under investigation for COVID-19. In attempts to have only essential personnel enter the room and conserve PPE, we will confer with nursing and/or the referring provider to determine the most appropriate timing of our therapy intervention. Until this time, we will follow the patient peripherally to support nursing staff on an appropriate care plan. Thank you for your assistance. Pt with troponin from 1.11 to 1.81. Will hold and follow-up as appropriate.

## 2020-05-01 NOTE — ED NOTES
TRANSFER - OUT REPORT:    Verbal report given to [address] (name) on Priscila Piplisa  being transferred to  (unit) for routine progression of care       Report consisted of patients Situation, Background, Assessment and   Recommendations(SBAR). Information from the following report(s) SBAR, ED Summary, MAR and Med Rec Status was reviewed with the receiving nurse. Lines:   Peripheral IV 04/30/20 Left Forearm (Active)        Opportunity for questions and clarification was provided.

## 2020-05-01 NOTE — WOUND CARE
Wound Consult:  New Patient Visit. Chart reviewed. Consulted for sacral wound POA. Spoke with patients nurse,  Leroy Hong and we were at bedside together to assess and provide care. Patient being tested for [address]. Isolation precautions taken. Patient is resting on a Advance bed with pressure prevention mattress. Assessment: 
Sacrum - 1 x 1 x 0.1 cm area of injury, moist pink edges with pale yellow base; no surrounding redness, Unstageable pressure injury POA. Right lower chest area - raised pink/red area ~ 0.7 x 0.5 cm, patient reports it bleeds if she picks it; has not had area tested or biopsied. No bleeding currently. Left 2nd toe - lateral area with small loida maroon discolored area ~ 0.3 x 0.3 cm, had dried blooding drainage between 2nd and 3rd. Left 3rd toe - medial nail bed - crusted dry bloody drainage, no redness in toe. All toes and dorsal feet have red, petechial appearance with additional ecchymotic area along right ankle/foot - patient tells me she has had several falls prior to admission. Treatment: 
Optifoam dressing to sacrum. Mepitel One to right chest lesion to protect. Floated heels - moisturized feet. Turned and repositioned with Leroy Hong. Wound Recommendations: 
Optifoam dressing to sacrum; change every three days and as needed. Mepitel One to right chest lesion, change as needed. Skin Care Recommendations: 1. Minimize friction/shear: minimize layers of linen/pads under patient. 2. Off load pressure/reposition: continue to turn and reposition approximately every 2 -3 hours; float heels; waffle cushion for sitting. 3. Manage Moisture - keep skin folds dry; incontinence skin care; Pure wick in use. 4. Continue to monitor nutrition, pain, and skin risk scale, and skin assessment. Plan: 
Spoke with Dr. Tod Lowe regarding findings and obtained orders for treatment. We will continue to reassess routinely and as needed. Sandra Hassan RN,[facility] Wound Healing Office [phone] Pager [phone]

## 2020-05-01 NOTE — CONSULTS
Eder Kwan  DIVISION of Trevor Toro M.D., [facility]  [phone]        NAME: Dionte Lucio   :  1940   MRN:  [id number]  Date/Time:  202012:05 PM      ________________________________________________________________________   Assessment/Plan  The patient is 67years old woman with extensive past medical history. She does have a she does have history of Parkinson disease, coronary disease status post CABG in  with LIMA to LAD SVG to the diagonal and SVG to the OM. Complete occlusion of the RCA. Also history of hypertension hyperlipidemia chronic kidney disease left carotid artery stenosis status post endarterectomy, history of CVA who presented with several weeks history of what she describes as weakness. Mostly she describes as a weak legs at times associated with some pain. She denies any recent chest discomfort or similar symptom. No shortness of breath. No palpitations. Cardiology was called for elevated troponin. 1.  Elevated troponin: Her troponin is somewhat somewhat elevated. There is a further elevation for 1.1-1.8. To be noted that the admitting troponin was less than 0.05. Nonetheless she has had no symptoms while in hospital as far as cardiac complaints. Her electrocardiogram reveals a sinus bradycardia left anterior fascicular block left ventricular hypertrophy intraventricular conduction delay and ST-T wave abnormalities in the lateral leads. Compared to previous electrocardiogram of 2020 the ST and T wave abnormalities are more pronounced in the lateral leads. On the other hand compared to the echocardiogram of 2019 there are no significant changes. Difficult to ascertain if anything acute cardiac wise is ongoing at this time (she is completely asymptomatic from cardiac standpoint) but the elevation of the troponin has some concern.        No significant elevation in bp or hr noted. She did have some increase in creatinine but troponin elevation seems excessive in respect to the creatinine level    At this time she is on appropriate medical therapy with aspirin Plavix Coreg and statins. Neurological work-up in progress at this time. I would like to wait for the neurological evaluation to be completed prior to consider the possibility of cardiac catheterization this lady. I have discussed with her the potential for cardiac catheterization she is agreeable if needed . in my opinion stress test would not be very helpful in her particular case. For now continue same medications with no changes from cardiac standpoint. Continue to follow serial troponin. Last echo of July 2019 with normal ejection fraction and mild aortic stenosis and mitral regurgitation. Once she rules out for COVID proceed with limited echo for ejection fraction only. ICD-10-CM ICD-9-CM    1. Weakness R53.1 780.79    2. MORENO (acute kidney injury) ([facility] [address] N17.9 584.9           07/11/19   ECHO ADULT COMPLETE 07/11/2019 7/11/2019    Narrative · Left Ventricle: Normal cavity size and systolic function (ejection   fraction normal). Mild concentric hypertrophy. Estimated left ventricular   ejection fraction is 61 - 65%. No regional wall motion abnormality noted. Mild (grade 1) left ventricular diastolic dysfunction. · Aortic Valve: Aortic valve sclerosis with stenosis. Aortic valve mean   gradient is 9.2 mmHg. Aortic valve area is 1.8 cm2. Mild aortic valve   stenosis is present. Mild aortic valve regurgitation is present. · Mitral Valve: Moderate mitral annular calcification. Mild mitral valve   regurgitation. · Left Atrium: Moderately dilated left atrium. · Pulmonary Artery: There is no evidence of pulmonary hypertension. Signed by: Felix Manjarrez MD      02/09/15   NM CARDIAC SPECT W STRS/REST MULT 02/10/2015 2/10/2015    Narrative **Final Report**       ICD Codes / Adm. Diagnosis: E888.9 427.89 / Unspecified fall  Other   specified cardiac dysrhy  Examination:  NM CARD SPECT MULT WALL EF  - [id number] - Feb 10 2015 11:05AM  Accession No:  [id number]  Reason:  cad      REPORT:  EXAM:  NM CARD SPECT MULT WALL EF    INDICATION: cad    COMPARISON:        TRACER: Tc 99m sestamibi    TECHNIQUE:  Resting SPECT images of the heart were obtained following the   uneventful intravenous administration of 11 mCi of Tc 99m sestamibi. Gated stress SPECT images of the heart were obtained following LexiScan   protocol and the uneventful intravenous administration of 32 mCi of Tc 99m   sestamibi. FINDINGS: At stress, there is diminished activity in the apex and inferior   wall which is mildly improved rest..     Left ventricular wall motion is normal except for slight diminished wall   thickening inferior left ventricle. Left ventricular ejection fraction is 70   %. IMPRESSION:   At stress, there is diminished activity in the apex and inferior wall which   is mildly improved at rest suspicious for ischemia. Left ventricular   ejection fraction is 70%. The floor was notified of the results. Reji Rai              Signing/Reading Doctor: Kaykay Oneal ([id number])    Yemi Steve ([id number])  Feb 10 2015  3:28PM                                  Signed by: Kell Pena MD         CT Results (most recent):  Results from [place] encounter on 02/24/20   CTA NECK    Narrative INDICATION: Carotid stenosis. COMPARISON: 7/12/2019. Contrast-enhanced CT angiogram head and neck performed using 100 cc Isovue-370. Three-dimensional postprocessing was performed. CT dose reduction was achieved through use of a standardized protocol tailored  for this examination and are automatic exposure control for dose modulation dose  reduction    NECK:    The origins of the great vessels are patent. The right vertebral artery is  widely patent and dominant.  The left vertebral artery is diminutive in caliber  but grossly patent. There is been interval carotid endarterectomy on the left left. However, there  is persistent severe stenosis in the proximal left internal carotid  approximately at least 80% using NASCET criteria. There is calcification of the  distal left internal carotid artery. On the right there is heavy calcification at the carotid bifurcation making  quantification of stenosis somewhat difficult. There is probably approximately  60% stenosis at the origin of the right internal carotid artery. There are  calcifications of the distal right internal carotid artery at the level of C1  with approximately 50-60% stenosis distally. Head:    Major intracranial vessels are patent. No large vessel occlusion or intracranial  aneurysm. No evidence for intracranial hemorrhage or acute stroke. The  underlying brain demonstrates chronic ischemic change in the white matter. Impression IMPRESSION:  1. Patient status post left carotid endarterectomy. However, there is persistent  severe stenosis of the proximal left internal carotid measuring at least 80%  using NASCET criteria. 2. Moderate proximally 60% stenosis in proximal right internal carotid artery. There is additional calcification and at least moderate stenosis of the distal  right internal carotid artery as well. 3. No acute abnormality. Subjective:   CHIEF COMPLAINT:  \"abnormal troponin\"  Pertinent items are noted in HPI. HISTORY OF PRESENT ILLNESS:     Trey Noland is a 67 y.o. female whom presents with complaint noted above. We were asked to evaluation for the above problems.    Minal Hendrickson  Past Medical History:   Diagnosis Date    Anxiety disorder     Atrial fibrillation (HCC)     CAD (coronary artery disease) 2007    stents, CABG x 3v    Carotid stenosis     Cervical stenosis of spinal canal 07/2019    Chronic kidney disease     Cough     CVA (cerebral vascular accident) ([facility] [address] 07/2019    left lacunar infarct at head of caudate    Depression     AND CHRONIC ANXIETY    Diabetes ([facility]     GERD (gastroesophageal reflux disease)     High cholesterol     History of peptic ulcer     Bleeding ulcer with increased NSAID use    Hypertension     Left carotid stenosis 2019    s/p left CEA with Dr. Shakila Smith, old 2007    PUD (peptic ulcer disease)     Stroke ([facility]     Tremor     Valvular heart disease       Past Surgical History:   Procedure Laterality Date    CARDIAC SURG PROCEDURE UNLIST      CABG X3 VESSEL    HX CAROTID ENDARTERECTOMY  2019    HX CORONARY ARTERY BYPASS GRAFT      HX TONSILLECTOMY  1963    TOTAL KNEE ARTHROPLASTY Right 2015     Social History     Tobacco Use    Smoking status: Former Smoker     Packs/day: 0.25     Years: 5.00     Pack years: 1.25     Types: Cigarettes     Last attempt to quit:      Years since quittin.3    Smokeless tobacco: Never Used   Substance Use Topics    Alcohol use: Yes     Alcohol/week: 0.0 standard drinks     Comment: Rare      Family History   Problem Relation Age of Onset    Heart Attack Mother 72        Dec 81yo    Hypertension Mother     Other Father         Unknown    Parkinson's Disease Brother     Anesth Problems Neg Hx       No Known Allergies        Current Facility-Administered Medications   Medication Dose Route Frequency    ARIPiprazole (ABILIFY) tablet 15 mg  15 mg Oral DAILY    aspirin delayed-release tablet 81 mg  81 mg Oral DAILY    atorvastatin (LIPITOR) tablet 40 mg  40 mg Oral QHS    carbidopa-levodopa (SINEMET)  mg per tablet 2 Tab  2 Tab Oral QID    carvediloL (COREG) tablet 25 mg  25 mg Oral BID WITH MEALS    . PHARMACY TO SUBSTITUTE PER PROTOCOL (Reordered from: cilostazol (PLETAL PO))    Per Protocol    clopidogreL (PLAVIX) tablet 75 mg  75 mg Oral DAILY AFTER BREAKFAST    cyanocobalamin (VITAMIN B12) tablet 100 mcg  100 mcg Oral DAILY    diazePAM (VALIUM) tablet 2 mg  2 mg Oral TID    DULoxetine (CYMBALTA) capsule 120 mg  120 mg Oral DAILY    hydrALAZINE (APRESOLINE) tablet 25 mg  25 mg Oral BID    pantoprazole (PROTONIX) tablet 40 mg  40 mg Oral ACB    nitroglycerin (NITROSTAT) tablet 0.4 mg  0.4 mg SubLINGual Q5MIN PRN    sodium chloride (NS) flush 5-40 mL  5-40 mL IntraVENous Q8H    sodium chloride (NS) flush 5-40 mL  5-40 mL IntraVENous PRN    ondansetron (ZOFRAN) injection 4 mg  4 mg IntraVENous Q4H PRN    bisacodyL (DULCOLAX) tablet 5 mg  5 mg Oral DAILY PRN    heparin (porcine) injection 5,000 Units  5,000 Units SubCUTAneous Q8H    acetaminophen (TYLENOL) tablet 650 mg  650 mg Oral Q4H PRN    lactated Ringers infusion  100 mL/hr IntraVENous CONTINUOUS            Prior to Admission medications    Medication Sig Start Date End Date Taking? Authorizing Provider   hydrALAZINE (APRESOLINE) 25 mg tablet Take 25 mg by mouth two (2) times a day. Yes Provider, Historical   triamcinolone (Nasacort) 55 mcg nasal inhaler 2 Sprays daily. Yes Provider, Historical   aspirin delayed-release (Ecotrin Low Strength) 81 mg tablet Take  by mouth daily. Yes Provider, Historical   clopidogreL (Plavix) 75 mg tab Take 75 mg by mouth daily (after breakfast). Yes Provider, Historical   carbidopa-levodopa (SINEMET)  mg per tablet Take 2 Tabs by mouth four (4) times daily. 2/4/20  Yes Kimberly Rodríguez MD   zolpidem (AMBIEN) 10 mg tablet Take  by mouth nightly as needed for Sleep. Yes Provider, Historical   hydrOXYzine pamoate (VISTARIL) 50 mg capsule Take 25 mg by mouth three (3) times daily as needed for Itching. Yes Provider, Historical   diazePAM (VALIUM) 2 mg tablet Take 2 mg by mouth three (3) times daily. Indications: CHRONIC PAIN   Yes Provider, Historical   cyanocobalamin (VITAMIN B12) 100 mcg tablet Take 100 mcg by mouth daily. Yes Provider, Historical   senna-docusate (SENNA WITH DOCUSATE SODIUM) 8.6-50 mg per tablet Take 1 Tab by mouth nightly.    Yes Provider, Historical   ARIPiprazole (ABILIFY) 15 mg tablet Take 15 mg by mouth daily. Indications: Additional Medications to Treat Depression   Yes Provider, Historical   Cholecalciferol, Vitamin D3, 1,000 unit cap Take 1,000 Units by mouth daily. Yes Provider, Historical   DULoxetine (CYMBALTA) 60 mg capsule Take 120 mg by mouth daily. Indications: Anxiousness associated with Depression   Yes Provider, Historical   multivitamins-minerals-lutein (CENTRUM SILVER) tab tablet Take 1 Tab by mouth daily. Yes Provider, Historical   atorvastatin (LIPITOR) 40 mg tablet Take 40 mg by mouth nightly. Provider, Historical   nabumetone (Relafen DS) 1,000 mg tab Take  by mouth nightly. Provider, Historical   cilostazol (PLETAL PO) Take  by mouth two (2) times daily (with meals). Provider, Historical   nitroglycerin (Nitrostat) 0.4 mg SL tablet 0.4 mg by SubLINGual route every five (5) minutes as needed for Chest Pain. Up to 3 doses. Provider, Historical   acetaminophen (TYLENOL) 325 mg tablet Take 650 mg by mouth every six (6) hours as needed for Pain or Fever. Provider, Historical   carvedilol (COREG) 12.5 mg tablet Take 2 Tabs by mouth two (2) times daily (with meals). Patient taking differently: Take 25 mg by mouth two (2) times daily (with meals). DOSAGE IS 25 MG BID 7/18/19   Abraham Us MD   vit C,V-Us-cwpkt-lutein-zeaxan (PRESERVISION AREDS-2) [id number] mg-unit-mg-mg cap capsule Take 1 Cap by mouth two (2) times a day. Provider, Historical   methocarbamol (ROBAXIN) 750 mg tablet Take 750 mg by mouth three (3) times daily as needed. Indications: muscle spasm    Provider, Historical   LACTOBACILLUS RHAMNOSUS GG (CULTURELLE PO) Take 1 Tab by mouth daily. Provider, Historical   pantoprazole (PROTONIX) 40 mg tablet Take 40 mg by mouth Daily (before breakfast). Indications: h/o bleeding ulcer with nsaid    Provider, Historical           EKG: normal sinus rhythm, ST depression in lat.        XR Results (most recent):  Results from Hospital Encounter encounter on 04/30/20   XR CHEST PORT    Narrative EXAM: XR CHEST PORT    INDICATION: weakness    COMPARISON: 3/18/2020    FINDINGS: A portable AP radiograph of the chest was obtained at 1923 hours. There is no pneumothorax or pleural effusion. The lungs are clear. Cardiac,  mediastinal and hilar contours are stable with unchanged mild-moderate  atherosclerotic thoracic aortic tortuosity. Superior median sternotomy wires  are again shown. The bones are severely osteopenic. Impression IMPRESSION: No acute findings. Objective:   VITALS:      Patient Vitals for the past 12 hrs:   Temp Pulse Resp BP SpO2   05/01/20 0852 97.8 °F (36.6 °C) (!) 52 18 168/71 98 %   05/01/20 0612  62      05/01/20 0457 98.5 °F (36.9 °C) (!) 119 20 168/80 97 %      Visit Vitals  /71 (BP 1 Location: Left arm, BP Patient Position: At rest)   Pulse (!) 52   Temp 97.8 °F (36.6 °C)   Resp 18   Ht 5' 5\" (1.651 m)   Wt 162 lb 9.6 oz (73.8 kg)   SpO2 98%   BMI 27.06 kg/m²     Extended / Orthostatic Vitals:       Wt Readings from Last 3 Encounters:   05/01/20 162 lb 9.6 oz (73.8 kg)   05/01/20 162 lb (73.5 kg)   03/18/20 160 lb (72.6 kg)         Intake/Output Summary (Last 24 hours) at 5/1/2020 1205  Last data filed at 5/1/2020 1043  Gross per 24 hour   Intake    Output 575 ml   Net -575 ml                  LAB DATA REVIEWED:      All Cardiac Markers in the last 24 hours:    Lab Results   Component Value Date/Time    TROIQ 1.81 (H) 05/01/2020 09:55 AM    TROIQ 1.11 (H) 05/01/2020 06:37 AM    TROIQ <0.05 04/30/2020 07:10 PM         Recent Results (from the past 24 hour(s))   METABOLIC PANEL, COMPREHENSIVE    Collection Time: 04/30/20  7:10 PM   Result Value Ref Range    Sodium 136 136 - 145 mmol/L    Potassium 4.6 3.5 - 5.1 mmol/L    Chloride 105 97 - 108 mmol/L    CO2 25 21 - 32 mmol/L    Anion gap 6 5 - 15 mmol/L    Glucose 153 (H) 65 - 100 mg/dL    BUN 32 (H) 6 - 20 MG/DL    Creatinine 1.86 (H) 0.55 - 1.02 MG/DL    BUN/Creatinine ratio 17 12 - 20      GFR est AA 32 (L) >60 ml/min/1.73m2    GFR est non-AA 26 (L) >60 ml/min/1.73m2    Calcium 10.5 (H) 8.5 - 10.1 MG/DL    Bilirubin, total 0.9 0.2 - 1.0 MG/DL    ALT (SGPT) 12 12 - 78 U/L    AST (SGOT) 33 15 - 37 U/L    Alk. phosphatase 96 45 - 117 U/L    Protein, total 6.6 6.4 - 8.2 g/dL    Albumin 3.5 3.5 - 5.0 g/dL    Globulin 3.1 2.0 - 4.0 g/dL    A-G Ratio 1.1 1.1 - 2.2     TROPONIN I    Collection Time: 04/30/20  7:10 PM   Result Value Ref Range    Troponin-I, Qt. <0.05 <0.05 ng/mL   SAMPLES BEING HELD    Collection Time: 04/30/20  7:10 PM   Result Value Ref Range    SAMPLES BEING HELD 1RED,1BLU     COMMENT        Add-on orders for these samples will be processed based on acceptable specimen integrity and analyte stability, which may vary by analyte.    EKG, 12 LEAD, INITIAL    Collection Time: 04/30/20  7:29 PM   Result Value Ref Range    Ventricular Rate 58 BPM    Atrial Rate 58 BPM    P-R Interval 158 ms    QRS Duration 100 ms    Q-T Interval 466 ms    QTC Calculation (Bezet) 457 ms    Calculated P Axis 9 degrees    Calculated R Axis -49 degrees    Calculated T Axis 117 degrees    Diagnosis       Sinus bradycardia  Left anterior fascicular block  Voltage criteria for left ventricular hypertrophy  T wave abnormality, consider lateral ischemia  When compared with ECG of 18-MAR-2020 08:31,  T wave inversion more evident in Lateral leads     URINALYSIS W/MICROSCOPIC    Collection Time: 04/30/20  7:57 PM   Result Value Ref Range    Color DARK YELLOW      Appearance CLEAR CLEAR      Specific gravity 1.025 1.003 - 1.030      pH (UA) 5.0 5.0 - 8.0      Protein 100 (A) NEG mg/dL    Glucose Negative NEG mg/dL    Ketone 15 (A) NEG mg/dL    Blood Negative NEG      Urobilinogen 1.0 0.2 - 1.0 EU/dL    Nitrites Negative NEG      Leukocyte Esterase Negative NEG      WBC 0-4 0 - 4 /hpf    RBC 0-5 0 - 5 /hpf    Epithelial cells FEW FEW /lpf    Bacteria Negative NEG /hpf Hyaline cast 2-5 0 - 5 /lpf   URINE CULTURE HOLD SAMPLE    Collection Time: 04/30/20  7:57 PM   Result Value Ref Range    Urine culture hold        Urine on hold in Microbiology dept for 2 days. If unpreserved urine is submitted, it cannot be used for addtional testing after 24 hours, recollection will be required. CBC WITH AUTOMATED DIFF    Collection Time: 04/30/20  7:57 PM   Result Value Ref Range    WBC 5.5 3.6 - 11.0 K/uL    RBC 3.69 (L) 3.80 - 5.20 M/uL    HGB 11.1 (L) 11.5 - 16.0 g/dL    HCT 34.2 (L) 35.0 - 47.0 %    MCV 92.7 80.0 - 99.0 FL    MCH 30.1 26.0 - 34.0 PG    MCHC 32.5 30.0 - 36.5 g/dL    RDW 13.9 11.5 - 14.5 %    PLATELET 948 957 - 870 K/uL    MPV 9.1 8.9 - 12.9 FL    NRBC 0.0 0  WBC    ABSOLUTE NRBC 0.00 0.00 - 0.01 K/uL    NEUTROPHILS 61 32 - 75 %    LYMPHOCYTES 24 12 - 49 %    MONOCYTES 12 5 - 13 %    EOSINOPHILS 2 0 - 7 %    BASOPHILS 1 0 - 1 %    IMMATURE GRANULOCYTES 0 0.0 - 0.5 %    ABS. NEUTROPHILS 3.4 1.8 - 8.0 K/UL    ABS. LYMPHOCYTES 1.3 0.8 - 3.5 K/UL    ABS. MONOCYTES 0.6 0.0 - 1.0 K/UL    ABS. EOSINOPHILS 0.1 0.0 - 0.4 K/UL    ABS. BASOPHILS 0.1 0.0 - 0.1 K/UL    ABS. IMM. GRANS. 0.0 0.00 - 0.04 K/UL    DF AUTOMATED     BILIRUBIN, CONFIRM    Collection Time: 04/30/20  7:57 PM   Result Value Ref Range    Bilirubin UA, confirm Negative NEG     SAMPLES BEING HELD    Collection Time: 04/30/20  8:10 PM   Result Value Ref Range    SAMPLES BEING HELD 1 blue, 1 red, 1 pst     COMMENT        Add-on orders for these samples will be processed based on acceptable specimen integrity and analyte stability, which may vary by analyte.    SARS-COV-2    Collection Time: 04/30/20 10:57 PM   Result Value Ref Range    Specimen source Nasopharyngeal      SARS-CoV-2 PENDING    METABOLIC PANEL, COMPREHENSIVE    Collection Time: 05/01/20  6:37 AM   Result Value Ref Range    Sodium 138 136 - 145 mmol/L    Potassium 3.8 3.5 - 5.1 mmol/L    Chloride 108 97 - 108 mmol/L    CO2 24 21 - 32 mmol/L    Anion gap 6 5 - 15 mmol/L    Glucose 153 (H) 65 - 100 mg/dL    BUN 28 (H) 6 - 20 MG/DL    Creatinine 1.41 (H) 0.55 - 1.02 MG/DL    BUN/Creatinine ratio 20 12 - 20      GFR est AA 44 (L) >60 ml/min/1.73m2    GFR est non-AA 36 (L) >60 ml/min/1.73m2    Calcium 9.5 8.5 - 10.1 MG/DL    Bilirubin, total 0.5 0.2 - 1.0 MG/DL    ALT (SGPT) 15 12 - 78 U/L    AST (SGOT) 28 15 - 37 U/L    Alk. phosphatase 88 45 - 117 U/L    Protein, total 5.6 (L) 6.4 - 8.2 g/dL    Albumin 2.9 (L) 3.5 - 5.0 g/dL    Globulin 2.7 2.0 - 4.0 g/dL    A-G Ratio 1.1 1.1 - 2.2     LIPID PANEL    Collection Time: 05/01/20  6:37 AM   Result Value Ref Range    LIPID PROFILE          Cholesterol, total 129 <200 MG/DL    Triglyceride 92 <150 MG/DL    HDL Cholesterol 36 MG/DL    LDL, calculated 74.6 0 - 100 MG/DL    VLDL, calculated 18.4 MG/DL    CHOL/HDL Ratio 3.6 0.0 - 5.0     CBC WITH AUTOMATED DIFF    Collection Time: 05/01/20  6:37 AM   Result Value Ref Range    WBC 4.6 3.6 - 11.0 K/uL    RBC 3.41 (L) 3.80 - 5.20 M/uL    HGB 10.3 (L) 11.5 - 16.0 g/dL    HCT 31.1 (L) 35.0 - 47.0 %    MCV 91.2 80.0 - 99.0 FL    MCH 30.2 26.0 - 34.0 PG    MCHC 33.1 30.0 - 36.5 g/dL    RDW 13.6 11.5 - 14.5 %    PLATELET 134 130 - 155 K/uL    MPV 9.1 8.9 - 12.9 FL    NRBC 0.0 0  WBC    ABSOLUTE NRBC 0.00 0.00 - 0.01 K/uL    NEUTROPHILS 71 32 - 75 %    LYMPHOCYTES 18 12 - 49 %    MONOCYTES 9 5 - 13 %    EOSINOPHILS 2 0 - 7 %    BASOPHILS 0 0 - 1 %    IMMATURE GRANULOCYTES 0 0.0 - 0.5 %    ABS. NEUTROPHILS 3.3 1.8 - 8.0 K/UL    ABS. LYMPHOCYTES 0.8 0.8 - 3.5 K/UL    ABS. MONOCYTES 0.4 0.0 - 1.0 K/UL    ABS. EOSINOPHILS 0.1 0.0 - 0.4 K/UL    ABS. BASOPHILS 0.0 0.0 - 0.1 K/UL    ABS. IMM.  GRANS. 0.0 0.00 - 0.04 K/UL    DF AUTOMATED     TSH 3RD GENERATION    Collection Time: 05/01/20  6:37 AM   Result Value Ref Range    TSH 0.53 0.36 - 3.74 uIU/mL   MAGNESIUM    Collection Time: 05/01/20  6:37 AM   Result Value Ref Range    Magnesium 1.6 1.6 - 2.4 mg/dL   PHOSPHORUS    Collection Time: 05/01/20  6:37 AM   Result Value Ref Range    Phosphorus 2.9 2.6 - 4.7 MG/DL   TROPONIN I    Collection Time: 05/01/20  6:37 AM   Result Value Ref Range    Troponin-I, Qt. 1.11 (H) <0.05 ng/mL   TROPONIN I    Collection Time: 05/01/20  9:55 AM   Result Value Ref Range    Troponin-I, Qt. 1.81 (H) <0.05 ng/mL       Procedures: see electronic medical records for all procedures/Xrays and details which were not copied into this note but were reviewed prior to creation of Plan. Please note that this dictation was completed with [employer], the computer voice recognition software. Quite often unanticipated grammatical, syntax, homophones, and other interpretive errors are inadvertently transcribed by the computer software. Please disregard these errors.   Please excuse any errors that have escaped final proofreading.      ________________________________________________________________________  Total time spent with patient: 30 minutes         Care Plan discussed with:  Patient    Family    RN    Care Manager    Consultant/Specialist:     ______________________________________________________________________      Signed By: Cristhian Ortiz MD     May 1, 2020

## 2020-05-01 NOTE — PROGRESS NOTES
Notified MD of increased troponin level, now 1.81. Cardiology came and saw patient. Transfer order received to move patient to [facility] room 415.

## 2020-05-01 NOTE — ROUTINE PROCESS
Occupational Therapy Screening: 
Services are not indicated at this time. [facility] based on results obtained during the nursing admission assessment. The patients chart was reviewed and the patient is not appropriate for a skilled therapy evaluation at this time. Please consult occupational therapy if any therapy needs arise. Thank you.  
 
Edenilson Enriquez OT

## 2020-05-01 NOTE — PROGRESS NOTES
ANDREA:  1. COVID-19 results pending. 2. Return home at [facility]. 3. PT consult. Care Management Interventions  PCP Verified by CM: Yes  Palliative Care Criteria Met (RRAT>21 & CHF Dx)?: No  Mode of Transport at Discharge: BLS  MyChart Signup: No  Discharge Durable Medical Equipment: No  Health Maintenance Reviewed: Yes  Physical Therapy Consult: Yes  Occupational Therapy Consult: No  Speech Therapy Consult: No  Current Support Network: Lives Alone, Family Lives Nearby  Confirm Follow Up Transport: Family  The Plan for Transition of Care is Related to the Following Treatment Goals : Home with family assistance   The Patient and/or Patient Representative was Provided with a Choice of Provider and Agrees with the Discharge Plan?: Yes  The [employer] Information Provided?: No  Discharge Location  Discharge Placement: Home with family assistance  Reason for Admission:   Covid rule out, hx of parkinson disease                  RUR Score:     22%             PCP: First and Last name:  Dr. Michelle Adame   Name of Practice: [address] [place] [address]   Are you a current patient: Yes/No: Yes   Approximate date of last visit:  Unknown    Do you (patient/family) have any concerns for transition/discharge? Patient stated she was having difficulty walking prior to admission, cm suggested PT consult to attending MD.              Plan for utilizing home health:    No indication at this time. Current Advanced Directive/Advance Care Plan: On file from 2015, patient stated she was able to make her own decisions. Transition of Care Plan:                          Reviewed chart for transitions of care,and discussed in rounds. CM met with patient at bedside to explain role and offer support. Patient is alert and oriented x4, and confirmed demographics.  Patient confirmed she lives independent at [employer], using a walker for ADLs, She has used home health and rehab in the past, and does not want to go to a rehab facility. Patient receives 1 meal/day from [employer]. She uses [facility] for her prescriptions, and receives SSI for income. Patient requested to have transportation when discharged. CM to follow for further discharge needs. CM requested Palliative consult, however per attending MD patient does not require consult at this time.     [facility]anoKi[facility]

## 2020-05-01 NOTE — PROGRESS NOTES
Patient alert and oriented,appetite is good, reports multiple falls at home due to weakness. Elevated troponin this morning, cardiology and neurology consulted. Patient has small sacral wound for wound care to see today. Problem: Falls - Risk of  Goal: *Absence of Falls  Description: Document Clydene Bone Fall Risk and appropriate interventions in the flowsheet. Outcome: Progressing Towards Goal  Note: Fall Risk Interventions:  Mobility Interventions: Communicate number of staff needed for ambulation/transfer, Bed/chair exit alarm         Medication Interventions: Evaluate medications/consider consulting pharmacy, Patient to call before getting OOB, Teach patient to arise slowly, Bed/chair exit alarm    Elimination Interventions: Patient to call for help with toileting needs, Toileting schedule/hourly rounds    History of Falls Interventions: Bed/chair exit alarm, Room close to nurse's station, Evaluate medications/consider consulting pharmacy         Problem: Patient Education: Go to Patient Education Activity  Goal: Patient/Family Education  Outcome: Progressing Towards Goal     Problem: Pressure Injury - Risk of  Goal: *Prevention of pressure injury  Description: Document Gregorio Scale and appropriate interventions in the flowsheet.   Outcome: Progressing Towards Goal  Note: Pressure Injury Interventions:  Sensory Interventions: Assess changes in LOC, Float heels, Keep linens dry and wrinkle-free, Minimize linen layers    Moisture Interventions: Absorbent underpads, Apply protective barrier, creams and emollients    Activity Interventions: Increase time out of bed, Pressure redistribution bed/mattress(bed type)    Mobility Interventions: HOB 30 degrees or less, Pressure redistribution bed/mattress (bed type)    Nutrition Interventions: Document food/fluid/supplement intake    Friction and Shear Interventions: Apply protective barrier, creams and emollients, Lift sheet, Minimize layers                Problem: Patient Education: Go to Patient Education Activity  Goal: Patient/Family Education  Outcome: Progressing Towards Goal     Problem: General Medical Care Plan  Goal: *Vital signs within specified parameters  Outcome: Progressing Towards Goal  Goal: *Labs within defined limits  Outcome: Progressing Towards Goal  Goal: *Absence of infection signs and symptoms  Outcome: Progressing Towards Goal  Goal: *Optimal pain control at patient's stated goal  Outcome: Progressing Towards Goal  Goal: *Skin integrity maintained  Outcome: Progressing Towards Goal  Goal: *Fluid volume balance  Outcome: Progressing Towards Goal  Goal: *Optimize nutritional status  Outcome: Progressing Towards Goal  Goal: *Anxiety reduced or absent  Outcome: Progressing Towards Goal  Goal: *Progressive mobility and function (eg: ADL's)  Outcome: Progressing Towards Goal     Problem: Patient Education: Go to Patient Education Activity  Goal: Patient/Family Education  Outcome: Progressing Towards Goal

## 2020-05-01 NOTE — PROGRESS NOTES
Admission Medication Reconciliation:    Information obtained from:  Patient, [address] [place] [address] Road Mail Order    Comments/Recommendations: Reviewed PTA medications and patient's allergies. Patient was not a good historian. Did not want to review medications as she said she had already reviewed them with someone and wasn't sure of the \"pharmaceutical names\". [address] who confirmed recent fills of Abilify 20mg daily, Sinemet 25-100mg 2 tabs QID, Coreg 12.5mg BID, Cymbalta 120mg daily, hydralazine 25mg BID, pantoprazole 40mg daily, zolpidem 10mg QHS, bupropion 75mg daily, hydroxyzine 50mg Q6h PRN. These were the only changes made to PTA med list.   [facility] mail order pharmacy does not have any prescription medications for her. Patient unable to confirm if she is currently taking cilostazol - says name does not sound familiar. [address] reports a prescription on hold for 100mg BID. Patient did confirm taking aspiring [address] says last fill of Plavix was on 3/11/20 for 30 day supply. Recommend follow up with patient (called again after speak with pharmacy - no answer). Note - took 5 attempts to get in touch with patient. ¹[employer] pharmacy benefit data reflects medications filled and processed through the patient's insurance, however   this data does NOT capture whether the medication was picked up or is currently being taken by the patient. Allergies:  Patient has no known allergies.     Significant PMH/Disease States:   Past Medical History:   Diagnosis Date    Anxiety disorder     Atrial fibrillation (HCC)     CAD (coronary artery disease) 2007    stents, CABG x 3v    Carotid stenosis     Cervical stenosis of spinal canal 07/2019    Chronic kidney disease     Cough     CVA (cerebral vascular accident) ([facility] [address] 07/2019    left lacunar infarct at head of caudate    Depression     AND CHRONIC ANXIETY    Diabetes ([facility] [address]     GERD (gastroesophageal reflux disease)     High cholesterol     History of peptic ulcer     Bleeding ulcer with increased NSAID use    Hypertension     Left carotid stenosis 07/2019    s/p left CEA with Dr. Brian Hernandez, old 2007    PUD (peptic ulcer disease)     Stroke [facility])     Tremor     Valvular heart disease      Chief Complaint for this Admission:    Chief Complaint   Patient presents with    Fatigue     Prior to Admission Medications:   Prior to Admission Medications   Prescriptions Last Dose Informant Patient Reported? Taking? ARIPiprazole (Abilify) 20 mg tablet   Yes Yes   Sig: Take 20 mg by mouth daily. Cholecalciferol, Vitamin D3, 1,000 unit cap 4/30/2020 at Unknown time  Yes Yes   Sig: Take 1,000 Units by mouth daily. DULoxetine (CYMBALTA) 60 mg capsule 4/30/2020 at Unknown time  Yes Yes   Sig: Take 120 mg by mouth daily. Indications: Anxiousness associated with Depression   LACTOBACILLUS RHAMNOSUS GG (CULTURELLE PO) Not Taking at Unknown time  Yes No   Sig: Take 1 Tab by mouth daily. acetaminophen (TYLENOL) 325 mg tablet 4/30/2020  Yes No   Sig: Take 650 mg by mouth every six (6) hours as needed for Pain or Fever. aspirin delayed-release (Ecotrin Low Strength) 81 mg tablet 4/29/2020 at 1930  Yes Yes   Sig: Take 81 mg by mouth nightly. atorvastatin (LIPITOR) 40 mg tablet 4/29 at 1930  Yes No   Sig: Take 40 mg by mouth nightly. carbidopa-levodopa (SINEMET)  mg per tablet 4/30  No Yes   Sig: Take 2 Tabs by mouth four (4) times daily. carvediloL (Coreg) 12.5 mg tablet   Yes Yes   Sig: Take 12.5 mg by mouth two (2) times daily (with meals). cilostazol (PLETAL PO)   Yes No   Sig: Take  by mouth two (2) times daily (with meals). clopidogreL (Plavix) 75 mg tab 4/30/2020 at Unknown time  Yes Yes   Sig: Take 75 mg by mouth daily (after breakfast). cyanocobalamin (VITAMIN B12) 100 mcg tablet 4/30/2020 at Unknown time  Yes Yes   Sig: Take 100 mcg by mouth daily.    diazePAM (VALIUM) 2 mg tablet 4/30/2020 at 0700 Yes Yes   Sig: Take 2 mg by mouth three (3) times daily. Indications: CHRONIC PAIN   hydrALAZINE (APRESOLINE) 25 mg tablet 2020 at 0700  Yes Yes   Sig: Take 25 mg by mouth two (2) times a day.   hydrOXYzine pamoate (VISTARIL) 50 mg capsule 2020 at 0700  Yes Yes   Sig: Take 50 mg by mouth every six (6) hours as needed for Itching. methocarbamol (ROBAXIN) 750 mg tablet Unknown at Unknown time  Yes No   Sig: Take 750 mg by mouth three (3) times daily as needed. Indications: muscle spasm   multivitamins-minerals-lutein (CENTRUM SILVER) tab tablet 2020 at Unknown time  Yes Yes   Sig: Take 1 Tab by mouth daily. nabumetone (Relafen DS) 1,000 mg tab Unknown at Unknown time  Yes No   Sig: Take  by mouth nightly. nitroglycerin (Nitrostat) 0.4 mg SL tablet   Yes No   Si.4 mg by SubLINGual route every five (5) minutes as needed for Chest Pain. Up to 3 doses. pantoprazole (PROTONIX) 40 mg tablet Unknown at Unknown time  Yes No   Sig: Take 40 mg by mouth Daily (before breakfast). Indications: h/o bleeding ulcer with nsaid   senna-docusate (SENNA WITH DOCUSATE SODIUM) 8.6-50 mg per tablet 2020 at 0700  Yes Yes   Sig: Take 1 Tab by mouth nightly. triamcinolone (Nasacort) 55 mcg nasal inhaler 2020 at Unknown time  Yes Yes   Si Sprays daily. vit C,F-Gw-hehkk-lutein-zeaxan (PRESERVISION AREDS-2) [id number] mg-unit-mg-mg cap capsule Unknown at Unknown time  Yes No   Sig: Take 1 Cap by mouth two (2) times a day. zolpidem (AMBIEN) 10 mg tablet  at 1930  Yes Yes   Sig: Take 10 mg by mouth nightly as needed for Sleep.       Facility-Administered Medications: None

## 2020-05-02 ENCOUNTER — APPOINTMENT (OUTPATIENT)
Dept: MRI IMAGING | Age: 80
DRG: 057 | End: 2020-05-02
Attending: INTERNAL MEDICINE
Payer: MEDICARE

## 2020-05-02 ENCOUNTER — APPOINTMENT (OUTPATIENT)
Dept: CT IMAGING | Age: 80
DRG: 057 | End: 2020-05-02
Attending: INTERNAL MEDICINE
Payer: MEDICARE

## 2020-05-02 LAB
ANION GAP SERPL CALC-SCNC: 4 MMOL/L (ref 5–15)
BASOPHILS # BLD: 0 K/UL (ref 0–0.1)
BASOPHILS NFR BLD: 0 % (ref 0–1)
BUN SERPL-MCNC: 23 MG/DL (ref 6–20)
BUN/CREAT SERPL: 17 (ref 12–20)
CALCIUM SERPL-MCNC: 9.2 MG/DL (ref 8.5–10.1)
CHLORIDE SERPL-SCNC: 108 MMOL/L (ref 97–108)
CO2 SERPL-SCNC: 26 MMOL/L (ref 21–32)
CREAT SERPL-MCNC: 1.35 MG/DL (ref 0.55–1.02)
DIFFERENTIAL METHOD BLD: ABNORMAL
EOSINOPHIL # BLD: 0.2 K/UL (ref 0–0.4)
EOSINOPHIL NFR BLD: 3 % (ref 0–7)
ERYTHROCYTE [DISTWIDTH] IN BLOOD BY AUTOMATED COUNT: 13.9 % (ref 11.5–14.5)
GLUCOSE SERPL-MCNC: 143 MG/DL (ref 65–100)
HCT VFR BLD AUTO: 31.4 % (ref 35–47)
HGB BLD-MCNC: 10.3 G/DL (ref 11.5–16)
IMM GRANULOCYTES # BLD AUTO: 0 K/UL (ref 0–0.04)
IMM GRANULOCYTES NFR BLD AUTO: 0 % (ref 0–0.5)
LYMPHOCYTES # BLD: 1.6 K/UL (ref 0.8–3.5)
LYMPHOCYTES NFR BLD: 22 % (ref 12–49)
MAGNESIUM SERPL-MCNC: 2 MG/DL (ref 1.6–2.4)
MCH RBC QN AUTO: 30.1 PG (ref 26–34)
MCHC RBC AUTO-ENTMCNC: 32.8 G/DL (ref 30–36.5)
MCV RBC AUTO: 91.8 FL (ref 80–99)
MONOCYTES # BLD: 0.6 K/UL (ref 0–1)
MONOCYTES NFR BLD: 9 % (ref 5–13)
NEUTS SEG # BLD: 4.8 K/UL (ref 1.8–8)
NEUTS SEG NFR BLD: 66 % (ref 32–75)
NRBC # BLD: 0 K/UL (ref 0–0.01)
NRBC BLD-RTO: 0 PER 100 WBC
PLATELET # BLD AUTO: 201 K/UL (ref 150–400)
PMV BLD AUTO: 9.2 FL (ref 8.9–12.9)
POTASSIUM SERPL-SCNC: 4.1 MMOL/L (ref 3.5–5.1)
RBC # BLD AUTO: 3.42 M/UL (ref 3.8–5.2)
SODIUM SERPL-SCNC: 138 MMOL/L (ref 136–145)
TROPONIN I SERPL-MCNC: 1.19 NG/ML
TROPONIN I SERPL-MCNC: 1.42 NG/ML
WBC # BLD AUTO: 7.2 K/UL (ref 3.6–11)

## 2020-05-02 PROCEDURE — 65660000000 HC RM CCU STEPDOWN

## 2020-05-02 PROCEDURE — 83735 ASSAY OF MAGNESIUM: CPT

## 2020-05-02 PROCEDURE — 84484 ASSAY OF TROPONIN QUANT: CPT

## 2020-05-02 PROCEDURE — 72192 CT PELVIS W/O DYE: CPT

## 2020-05-02 PROCEDURE — 85025 COMPLETE CBC W/AUTO DIFF WBC: CPT

## 2020-05-02 PROCEDURE — 70450 CT HEAD/BRAIN W/O DYE: CPT

## 2020-05-02 PROCEDURE — 80048 BASIC METABOLIC PNL TOTAL CA: CPT

## 2020-05-02 PROCEDURE — 74011250636 HC RX REV CODE- 250/636: Performed by: INTERNAL MEDICINE

## 2020-05-02 PROCEDURE — 77030038269 HC DRN EXT URIN PURWCK BARD -A

## 2020-05-02 PROCEDURE — 74011250637 HC RX REV CODE- 250/637: Performed by: INTERNAL MEDICINE

## 2020-05-02 PROCEDURE — 74011250637 HC RX REV CODE- 250/637: Performed by: HOSPITALIST

## 2020-05-02 PROCEDURE — 36415 COLL VENOUS BLD VENIPUNCTURE: CPT

## 2020-05-02 RX ORDER — POLYETHYLENE GLYCOL 3350 17 G/17G
17 POWDER, FOR SOLUTION ORAL DAILY
Status: DISCONTINUED | OUTPATIENT
Start: 2020-05-02 | End: 2020-05-11 | Stop reason: HOSPADM

## 2020-05-02 RX ORDER — AMOXICILLIN 250 MG
1 CAPSULE ORAL DAILY
Status: DISCONTINUED | OUTPATIENT
Start: 2020-05-02 | End: 2020-05-11 | Stop reason: HOSPADM

## 2020-05-02 RX ORDER — POLYETHYLENE GLYCOL 3350 17 G/17G
17 POWDER, FOR SOLUTION ORAL DAILY
Status: DISCONTINUED | OUTPATIENT
Start: 2020-05-03 | End: 2020-05-02

## 2020-05-02 RX ADMIN — HEPARIN SODIUM 5000 UNITS: 5000 INJECTION INTRAVENOUS; SUBCUTANEOUS at 02:54

## 2020-05-02 RX ADMIN — HYDRALAZINE HYDROCHLORIDE 25 MG: 25 TABLET, FILM COATED ORAL at 17:47

## 2020-05-02 RX ADMIN — DIAZEPAM 2 MG: 2 TABLET ORAL at 15:22

## 2020-05-02 RX ADMIN — ASPIRIN 81 MG: 81 TABLET, COATED ORAL at 08:25

## 2020-05-02 RX ADMIN — HEPARIN SODIUM 5000 UNITS: 5000 INJECTION INTRAVENOUS; SUBCUTANEOUS at 12:54

## 2020-05-02 RX ADMIN — CARBIDOPA AND LEVODOPA 2 TABLET: 25; 100 TABLET ORAL at 08:25

## 2020-05-02 RX ADMIN — Medication 10 ML: at 15:23

## 2020-05-02 RX ADMIN — ARIPIPRAZOLE 15 MG: 10 TABLET ORAL at 08:31

## 2020-05-02 RX ADMIN — DULOXETINE HYDROCHLORIDE 120 MG: 60 CAPSULE, DELAYED RELEASE PELLETS ORAL at 08:24

## 2020-05-02 RX ADMIN — VITAM B12 100 MCG: 100 TAB at 13:15

## 2020-05-02 RX ADMIN — CLOPIDOGREL BISULFATE 75 MG: 75 TABLET ORAL at 08:25

## 2020-05-02 RX ADMIN — DIAZEPAM 2 MG: 2 TABLET ORAL at 08:25

## 2020-05-02 RX ADMIN — POLYETHYLENE GLYCOL 3350 17 G: 17 POWDER, FOR SOLUTION ORAL at 16:24

## 2020-05-02 RX ADMIN — HYDRALAZINE HYDROCHLORIDE 25 MG: 25 TABLET, FILM COATED ORAL at 08:24

## 2020-05-02 RX ADMIN — ACETAMINOPHEN 650 MG: 325 TABLET, FILM COATED ORAL at 15:22

## 2020-05-02 RX ADMIN — HEPARIN SODIUM 5000 UNITS: 5000 INJECTION INTRAVENOUS; SUBCUTANEOUS at 18:27

## 2020-05-02 RX ADMIN — CARVEDILOL 25 MG: 12.5 TABLET, FILM COATED ORAL at 08:25

## 2020-05-02 RX ADMIN — Medication 10 ML: at 21:23

## 2020-05-02 RX ADMIN — ATORVASTATIN CALCIUM 40 MG: 40 TABLET, FILM COATED ORAL at 21:22

## 2020-05-02 RX ADMIN — CARBIDOPA AND LEVODOPA 2 TABLET: 25; 100 TABLET ORAL at 17:46

## 2020-05-02 RX ADMIN — SENNOSIDES AND DOCUSATE SODIUM 1 TABLET: 8.6; 5 TABLET ORAL at 12:54

## 2020-05-02 RX ADMIN — DIAZEPAM 2 MG: 2 TABLET ORAL at 21:22

## 2020-05-02 RX ADMIN — SODIUM CHLORIDE, SODIUM LACTATE, POTASSIUM CHLORIDE, AND CALCIUM CHLORIDE 100 ML/HR: 600; 310; 30; 20 INJECTION, SOLUTION INTRAVENOUS at 08:22

## 2020-05-02 RX ADMIN — CARBIDOPA AND LEVODOPA 2 TABLET: 25; 100 TABLET ORAL at 12:54

## 2020-05-02 RX ADMIN — PANTOPRAZOLE SODIUM 40 MG: 40 TABLET, DELAYED RELEASE ORAL at 07:02

## 2020-05-02 RX ADMIN — CARBIDOPA AND LEVODOPA 2 TABLET: 25; 100 TABLET ORAL at 21:22

## 2020-05-02 NOTE — PROGRESS NOTES
[address] [place] [address]               Division of Cardiology  [phone]                       Progress note              Yessica Sanchez M.D., MANDY.A.CRennyC. NAME:  Jamil Schmitz   :   1940   MRN:   [id number]         ICD-10-CM ICD-9-CM    1. Weakness R53.1 780.79    2. MORENO (acute kidney injury) ([facility] [address] N17.9 584.9                  HPI  Patient still with Daniel rule out. I have discussed with her over the phone on the current situation. She denies any chest pain or shortness of breath at this time. Her weakness is somewhat improved with hydration. Assessment/Plan:  1. CAD: She remains seemingly asymptomatic. Not withstanding this her troponin is elevated. She does have a history of coronary disease and her admission electrocardiom reveals more pronounced T wave inversion in the lateral leads. I have discussed with her the significance of troponin elevation. In my opinion we need to consider the possibility of cardiac catheterization once the neurological evaluation is completed. I discussed this with the patient who is agreeable to proceed with cardiac catheterization. In the meanwhile continue aspirin Plavix Coreg and statin. 2.  Paroxysmal atrial fibrillation: She is currently normal sinus rhythm she did have an episode of paroxysmal atrial fibrillation. No palpitations are reported. For now we will hold off oral anticoagulation depending also on recurrence of atrial fibrillation, the results of the neurological evaluation and potential need for cardiac catheterization. Proceed with echo once she rules out for COVID. 3.  Weakness: Neurologic evaluation in progress. Brain MRI still pending. 4.  Carotid artery stenosis: 80% stenosis in the right carotid artery. For potential surgery in the future followed by vascular surgery.          CARDIAC STUDIES      19   ECHO ADULT COMPLETE 07/11/2019 7/11/2019    Narrative · Left Ventricle: Normal cavity size and systolic function (ejection   fraction normal). Mild concentric hypertrophy. Estimated left ventricular   ejection fraction is 61 - 65%. No regional wall motion abnormality noted. Mild (grade 1) left ventricular diastolic dysfunction. · Aortic Valve: Aortic valve sclerosis with stenosis. Aortic valve mean   gradient is 9.2 mmHg. Aortic valve area is 1.8 cm2. Mild aortic valve   stenosis is present. Mild aortic valve regurgitation is present. · Mitral Valve: Moderate mitral annular calcification. Mild mitral valve   regurgitation. · Left Atrium: Moderately dilated left atrium. · Pulmonary Artery: There is no evidence of pulmonary hypertension. Signed by: Emanuel Buchanan MD       02/09/15   NM CARDIAC SPECT W STRS/REST MULT 02/10/2015 2/10/2015    Narrative **Final Report**       ICD Codes / Adm. Diagnosis: E888.9  427.89 / Unspecified fall  Other   specified cardiac dysrhy  Examination:  NM CARD SPECT MULT WALL EF  - [id number] - Feb 10 2015 11:05AM  Accession No:  [id number]  Reason:  cad      REPORT:  EXAM:  NM CARD SPECT MULT WALL EF    INDICATION: cad    COMPARISON:        TRACER: Tc 99m sestamibi    TECHNIQUE:  Resting SPECT images of the heart were obtained following the   uneventful intravenous administration of 11 mCi of Tc 99m sestamibi. Gated stress SPECT images of the heart were obtained following LexiScan   protocol and the uneventful intravenous administration of 32 mCi of Tc 99m   sestamibi. FINDINGS: At stress, there is diminished activity in the apex and inferior   wall which is mildly improved rest..     Left ventricular wall motion is normal except for slight diminished wall   thickening inferior left ventricle. Left ventricular ejection fraction is 70   %. IMPRESSION:   At stress, there is diminished activity in the apex and inferior wall which   is mildly improved at rest suspicious for ischemia.  Left ventricular   ejection fraction is 70%. The floor was notified of the results. Nolic Kacey              Signing/Reading Doctor: Nataliya Dueñas ([id number])    Lupis Musejeffery ([id number])  Feb 10 2015  3:28PM                                  Signed by: Gabriel Kiran MD             EKG Results     Procedure 720 Value Units Date/Time    EKG, 12 LEAD, INITIAL [[id number]] Collected:  05/01/20 0442    Order Status:  Completed Updated:  05/01/20 1353     Ventricular Rate 108 BPM      Atrial Rate 119 BPM      QRS Duration 106 ms      Q-T Interval 356 ms      QTC Calculation (Bezet) 477 ms      Calculated R Axis -46 degrees      Calculated T Axis 144 degrees      Diagnosis --     Atrial fibrillation with rapid ventricular response  Left axis deviation  Incomplete left bundle branch block  Moderate voltage criteria for LVH, may be normal variant  ST & T wave abnormality, consider lateral ischemia or digitalis effect  When compared with ECG of 30-APR-2020 19:29,  Atrial fibrillation has replaced Sinus rhythm  Vent.  rate has increased BY  50 BPM  Incomplete left bundle branch block is now present  ST now depressed in Lateral leads  Confirmed by Oumou Zaragoza M.D., Anay Hess ([id number]) on 5/1/2020 1:53:42 PM      EKG 12 LEAD INITIAL [[id number]] Collected:  04/30/20 1929    Order Status:  Completed Updated:  05/01/20 1348     Ventricular Rate 58 BPM      Atrial Rate 58 BPM      P-R Interval 158 ms      QRS Duration 100 ms      Q-T Interval 466 ms      QTC Calculation (Bezet) 457 ms      Calculated P Axis 9 degrees      Calculated R Axis -49 degrees      Calculated T Axis 117 degrees      Diagnosis --     Sinus bradycardia  Left anterior fascicular block  Voltage criteria for left ventricular hypertrophy  T wave abnormality, consider lateral ischemia  When compared with ECG of 18-MAR-2020 08:31,  T wave inversion more evident in Lateral leads  Confirmed by Oumou Zaragoza M.D., Sherill Askew ([id number]) on 5/1/2020 1:48:33 PM              IMAGING      CT Results (most recent):  Results from [facility] encounter on 02/24/20   CTA NECK    Narrative INDICATION: Carotid stenosis. COMPARISON: 7/12/2019. Contrast-enhanced CT angiogram head and neck performed using 100 cc Isovue-370. Three-dimensional postprocessing was performed. CT dose reduction was achieved through use of a standardized protocol tailored  for this examination and are automatic exposure control for dose modulation dose  reduction    NECK:    The origins of the great vessels are patent. The right vertebral artery is  widely patent and dominant. The left vertebral artery is diminutive in caliber  but grossly patent. There is been interval carotid endarterectomy on the left left. However, there  is persistent severe stenosis in the proximal left internal carotid  approximately at least 80% using NASCET criteria. There is calcification of the  distal left internal carotid artery. On the right there is heavy calcification at the carotid bifurcation making  quantification of stenosis somewhat difficult. There is probably approximately  60% stenosis at the origin of the right internal carotid artery. There are  calcifications of the distal right internal carotid artery at the level of C1  with approximately 50-60% stenosis distally. Head:    Major intracranial vessels are patent. No large vessel occlusion or intracranial  aneurysm. No evidence for intracranial hemorrhage or acute stroke. The  underlying brain demonstrates chronic ischemic change in the white matter. Impression IMPRESSION:  1. Patient status post left carotid endarterectomy. However, there is persistent  severe stenosis of the proximal left internal carotid measuring at least 80%  using NASCET criteria. 2. Moderate proximally 60% stenosis in proximal right internal carotid artery. There is additional calcification and at least moderate stenosis of the distal  right internal carotid artery as well.   3. No acute abnormality. XR Results (most recent):  Results from Hospital Encounter encounter on 04/30/20   XR CHEST PORT    Narrative EXAM: XR CHEST PORT    INDICATION: weakness    COMPARISON: 3/18/2020    FINDINGS: A portable AP radiograph of the chest was obtained at 1923 hours. There is no pneumothorax or pleural effusion. The lungs are clear. Cardiac,  mediastinal and hilar contours are stable with unchanged mild-moderate  atherosclerotic thoracic aortic tortuosity. Superior median sternotomy wires  are again shown. The bones are severely osteopenic. Impression IMPRESSION: No acute findings. MRI Results (most recent):            Visit Vitals  /45   Pulse 61   Temp 98.4 °F (36.9 °C)   Resp 18   Ht 5' 5\" (1.651 m)   Wt 156 lb 8.4 oz (71 kg)   SpO2 99%   BMI 26.05 kg/m²         Wt Readings from Last 3 Encounters:   05/02/20 156 lb 8.4 oz (71 kg)   05/01/20 162 lb (73.5 kg)   03/18/20 160 lb (72.6 kg)         Review of Systems:   Pertinent items are noted in the History of Present Illness.          05/02 0701 - 05/02 1900  In: -   Out: 350 [Urine:350]    04/30 1901 - 05/02 0700  In: 1071.7 [I.V.:1071.7]  Out: 1325 [Urine:1325]      Telemetry: normal sinus rhythm    Physical Exam:      Current Facility-Administered Medications   Medication Dose Route Frequency    ARIPiprazole (ABILIFY) tablet 15 mg  15 mg Oral DAILY    aspirin delayed-release tablet 81 mg  81 mg Oral DAILY    atorvastatin (LIPITOR) tablet 40 mg  40 mg Oral QHS    carbidopa-levodopa (SINEMET)  mg per tablet 2 Tab  2 Tab Oral QID    carvediloL (COREG) tablet 25 mg  25 mg Oral BID WITH MEALS    clopidogreL (PLAVIX) tablet 75 mg  75 mg Oral DAILY AFTER BREAKFAST    cyanocobalamin (VITAMIN B12) tablet 100 mcg  100 mcg Oral DAILY    diazePAM (VALIUM) tablet 2 mg  2 mg Oral TID    DULoxetine (CYMBALTA) capsule 120 mg  120 mg Oral DAILY    hydrALAZINE (APRESOLINE) tablet 25 mg  25 mg Oral BID    pantoprazole (PROTONIX) tablet 40 mg  40 mg Oral ACB    nitroglycerin (NITROSTAT) tablet 0.4 mg  0.4 mg SubLINGual Q5MIN PRN    sodium chloride (NS) flush 5-40 mL  5-40 mL IntraVENous Q8H    sodium chloride (NS) flush 5-40 mL  5-40 mL IntraVENous PRN    ondansetron (ZOFRAN) injection 4 mg  4 mg IntraVENous Q4H PRN    bisacodyL (DULCOLAX) tablet 5 mg  5 mg Oral DAILY PRN    heparin (porcine) injection 5,000 Units  5,000 Units SubCUTAneous Q8H    acetaminophen (TYLENOL) tablet 650 mg  650 mg Oral Q4H PRN    lactated Ringers infusion  100 mL/hr IntraVENous CONTINUOUS         All Cardiac Markers in the last 24 hours:    Lab Results   Component Value Date/Time    TROIQ 1.19 (H) 05/02/2020 07:07 AM    TROIQ 1.42 (H) 05/02/2020 03:14 AM    TROIQ 1.58 (H) 05/01/2020 11:03 PM    TROIQ 1.96 (H) 05/01/2020 06:38 PM    TROIQ 2.01 (H) 05/01/2020 02:10 PM        Lab Results   Component Value Date/Time    Creatinine (POC) 1.6 (H) 02/24/2020 10:32 AM    Creatinine 1.35 (H) 05/02/2020 03:14 AM          Lab Results   Component Value Date/Time    CK 75 07/11/2019 09:35 AM    CK - MB 2.7 07/11/2019 09:35 AM    CK-MB Index 3.6 (H) 07/11/2019 09:35 AM    Troponin-I, Qt. 1.19 (H) 05/02/2020 07:07 AM     (H) 06/08/2014 04:12 AM         Lab Results   Component Value Date/Time    WBC 7.2 05/02/2020 03:14 AM    HGB 10.3 (L) 05/02/2020 03:14 AM    HCT 31.4 (L) 05/02/2020 03:14 AM    PLATELET 102 04/39/1106 03:14 AM    MCV 91.8 05/02/2020 03:14 AM         Lab Results   Component Value Date/Time    Sodium 138 05/02/2020 03:14 AM    Potassium 4.1 05/02/2020 03:14 AM    Chloride 108 05/02/2020 03:14 AM    CO2 26 05/02/2020 03:14 AM    Anion gap 4 (L) 05/02/2020 03:14 AM    Glucose 143 (H) 05/02/2020 03:14 AM    BUN 23 (H) 05/02/2020 03:14 AM    Creatinine 1.35 (H) 05/02/2020 03:14 AM    BUN/Creatinine ratio 17 05/02/2020 03:14 AM    GFR est AA 46 (L) 05/02/2020 03:14 AM    GFR est non-AA 38 (L) 05/02/2020 03:14 AM    Calcium 9.2 05/02/2020 03:14 AM Lab Results   Component Value Date/Time     (H) 06/08/2014 04:12 AM     (H) 06/04/2014 04:44 AM    NT pro-BNP 1,941 (H) 07/11/2019 09:35 AM         Lab Results   Component Value Date/Time    Cholesterol, total 129 05/01/2020 06:37 AM    HDL Cholesterol 36 05/01/2020 06:37 AM    LDL, calculated 74.6 05/01/2020 06:37 AM    VLDL, calculated 18.4 05/01/2020 06:37 AM    Triglyceride 92 05/01/2020 06:37 AM    CHOL/HDL Ratio 3.6 05/01/2020 06:37 AM         Lab Results   Component Value Date/Time    ALT (SGPT) 15 05/01/2020 06:37 AM    AST (SGOT) 28 05/01/2020 06:37 AM    Alk.  phosphatase 88 05/01/2020 06:37 AM    Bilirubin, total 0.5 05/01/2020 06:37 AM

## 2020-05-02 NOTE — ROUTINE PROCESS
Verbal shift change report given to Damien Rios (oncoming nurse) by Kem Stanley RN (offgoing nurse). Report included the following information SBAR, Kardex, Intake/Output, MAR, Recent Results and Cardiac Rhythm Sinus Arrythmia Last 3 Recorded Weights in this Encounter 05/01/20 9334 05/02/20 0123 Weight: 73.8 kg (162 lb 9.6 oz) 71 kg (156 lb 8.4 oz)

## 2020-05-02 NOTE — PROGRESS NOTES
Problem: Pressure Injury - Risk of  Goal: *Prevention of pressure injury  Description: Document Gregorio Scale and appropriate interventions in the flowsheet. Outcome: Progressing Towards Goal  Note: Pressure Injury Interventions:  Sensory Interventions: Assess changes in LOC, Avoid rigorous massage over bony prominences, Discuss PT/OT consult with provider, Check visual cues for pain, Float heels, Keep linens dry and wrinkle-free, Maintain/enhance activity level, Minimize linen layers, Turn and reposition approx. every two hours (pillows and wedges if needed)    Moisture Interventions: Absorbent underpads, Check for incontinence Q2 hours and as needed, Internal/External urinary devices, Minimize layers    Activity Interventions: Pressure redistribution bed/mattress(bed type)    Mobility Interventions: Float heels, Pressure redistribution bed/mattress (bed type), Turn and reposition approx. every two hours(pillow and wedges)    Nutrition Interventions: Document food/fluid/supplement intake, Offer support with meals,snacks and hydration    Friction and Shear Interventions: Lift sheet, Minimize layers, Foam dressings/transparent film/skin sealants         Problem: General Medical Care Plan  Goal: *Vital signs within specified parameters  Outcome: Progressing Towards Goal  Visit Vitals  /45   Pulse 61   Temp 98.4 °F (36.9 °C)   Resp 18   Ht 5' 5\" (1.651 m)   Wt 71 kg (156 lb 8.4 oz)   SpO2 99%   BMI 26.05 kg/m²        1308: TRANSFER - OUT REPORT:    Verbal report given to Josseline Carver RN(name) on [employer]  being transferred to [facility] (unit) for routine progression of care       Report consisted of patients Situation, Background, Assessment and   Recommendations(SBAR). Information from the following report(s) SBAR, Kardex, Intake/Output, MAR, Accordion, Recent Results and Cardiac Rhythm sinus ham/NSR was reviewed with the receiving nurse. Opportunity for questions and clarification was provided.

## 2020-05-02 NOTE — PROGRESS NOTES
Problem: Falls - Risk of  Goal: *Absence of Falls  Description: Document Paddy Wilson Fall Risk and appropriate interventions in the flowsheet.   Outcome: Progressing Towards Goal  Note: Fall Risk Interventions:  Mobility Interventions: Communicate number of staff needed for ambulation/transfer, Patient to call before getting OOB         Medication Interventions: Evaluate medications/consider consulting pharmacy, Patient to call before getting OOB    Elimination Interventions: Call light in reach, Patient to call for help with toileting needs    History of Falls Interventions: Room close to nurse's station, Evaluate medications/consider consulting pharmacy         Problem: Patient Education: Go to Patient Education Activity  Goal: Patient/Family Education  Outcome: Progressing Towards Goal

## 2020-05-02 NOTE — PROGRESS NOTES
Physical Therapy  Received consult and chart reviewed, per progress note will defer PT evaluation until after the CT scan of the head and MRI. Will continue to follow up.   Shadia Castellanos PT

## 2020-05-03 ENCOUNTER — APPOINTMENT (OUTPATIENT)
Dept: MRI IMAGING | Age: 80
DRG: 057 | End: 2020-05-03
Attending: INTERNAL MEDICINE
Payer: MEDICARE

## 2020-05-03 ENCOUNTER — APPOINTMENT (OUTPATIENT)
Dept: MRI IMAGING | Age: 80
DRG: 057 | End: 2020-05-03
Attending: HOSPITALIST
Payer: MEDICARE

## 2020-05-03 PROCEDURE — 72141 MRI NECK SPINE W/O DYE: CPT

## 2020-05-03 PROCEDURE — 70551 MRI BRAIN STEM W/O DYE: CPT

## 2020-05-03 PROCEDURE — 77030038269 HC DRN EXT URIN PURWCK BARD -A

## 2020-05-03 PROCEDURE — 74011250636 HC RX REV CODE- 250/636: Performed by: FAMILY MEDICINE

## 2020-05-03 PROCEDURE — 74011250636 HC RX REV CODE- 250/636: Performed by: INTERNAL MEDICINE

## 2020-05-03 PROCEDURE — 65660000000 HC RM CCU STEPDOWN

## 2020-05-03 PROCEDURE — 94760 N-INVAS EAR/PLS OXIMETRY 1: CPT

## 2020-05-03 PROCEDURE — 74011250637 HC RX REV CODE- 250/637: Performed by: HOSPITALIST

## 2020-05-03 PROCEDURE — 74011250637 HC RX REV CODE- 250/637: Performed by: INTERNAL MEDICINE

## 2020-05-03 RX ORDER — HYDRALAZINE HYDROCHLORIDE 20 MG/ML
10 INJECTION INTRAMUSCULAR; INTRAVENOUS ONCE
Status: COMPLETED | OUTPATIENT
Start: 2020-05-03 | End: 2020-05-03

## 2020-05-03 RX ADMIN — DIAZEPAM 2 MG: 2 TABLET ORAL at 21:51

## 2020-05-03 RX ADMIN — POLYETHYLENE GLYCOL 3350 17 G: 17 POWDER, FOR SOLUTION ORAL at 08:32

## 2020-05-03 RX ADMIN — HYDRALAZINE HYDROCHLORIDE 25 MG: 25 TABLET, FILM COATED ORAL at 17:53

## 2020-05-03 RX ADMIN — CARBIDOPA AND LEVODOPA 2 TABLET: 25; 100 TABLET ORAL at 21:51

## 2020-05-03 RX ADMIN — Medication 10 ML: at 21:51

## 2020-05-03 RX ADMIN — DIAZEPAM 2 MG: 2 TABLET ORAL at 08:32

## 2020-05-03 RX ADMIN — HEPARIN SODIUM 5000 UNITS: 5000 INJECTION INTRAVENOUS; SUBCUTANEOUS at 10:49

## 2020-05-03 RX ADMIN — CARVEDILOL 25 MG: 12.5 TABLET, FILM COATED ORAL at 16:52

## 2020-05-03 RX ADMIN — HEPARIN SODIUM 5000 UNITS: 5000 INJECTION INTRAVENOUS; SUBCUTANEOUS at 03:18

## 2020-05-03 RX ADMIN — ACETAMINOPHEN 650 MG: 325 TABLET, FILM COATED ORAL at 15:14

## 2020-05-03 RX ADMIN — SENNOSIDES AND DOCUSATE SODIUM 1 TABLET: 8.6; 5 TABLET ORAL at 08:32

## 2020-05-03 RX ADMIN — HYDRALAZINE HYDROCHLORIDE 25 MG: 25 TABLET, FILM COATED ORAL at 08:32

## 2020-05-03 RX ADMIN — PANTOPRAZOLE SODIUM 40 MG: 40 TABLET, DELAYED RELEASE ORAL at 05:59

## 2020-05-03 RX ADMIN — SODIUM CHLORIDE, SODIUM LACTATE, POTASSIUM CHLORIDE, AND CALCIUM CHLORIDE 100 ML/HR: 600; 310; 30; 20 INJECTION, SOLUTION INTRAVENOUS at 01:48

## 2020-05-03 RX ADMIN — HEPARIN SODIUM 5000 UNITS: 5000 INJECTION INTRAVENOUS; SUBCUTANEOUS at 20:20

## 2020-05-03 RX ADMIN — CARBIDOPA AND LEVODOPA 2 TABLET: 25; 100 TABLET ORAL at 13:10

## 2020-05-03 RX ADMIN — DULOXETINE HYDROCHLORIDE 120 MG: 60 CAPSULE, DELAYED RELEASE PELLETS ORAL at 08:32

## 2020-05-03 RX ADMIN — CARBIDOPA AND LEVODOPA 2 TABLET: 25; 100 TABLET ORAL at 17:53

## 2020-05-03 RX ADMIN — CARVEDILOL 25 MG: 12.5 TABLET, FILM COATED ORAL at 08:32

## 2020-05-03 RX ADMIN — CLOPIDOGREL BISULFATE 75 MG: 75 TABLET ORAL at 08:33

## 2020-05-03 RX ADMIN — Medication 10 ML: at 13:56

## 2020-05-03 RX ADMIN — HYDRALAZINE HYDROCHLORIDE 10 MG: 20 INJECTION INTRAMUSCULAR; INTRAVENOUS at 10:49

## 2020-05-03 RX ADMIN — VITAM B12 100 MCG: 100 TAB at 08:32

## 2020-05-03 RX ADMIN — Medication 10 ML: at 05:59

## 2020-05-03 RX ADMIN — ATORVASTATIN CALCIUM 40 MG: 40 TABLET, FILM COATED ORAL at 21:51

## 2020-05-03 RX ADMIN — ARIPIPRAZOLE 15 MG: 10 TABLET ORAL at 08:40

## 2020-05-03 RX ADMIN — CARBIDOPA AND LEVODOPA 2 TABLET: 25; 100 TABLET ORAL at 08:32

## 2020-05-03 RX ADMIN — DIAZEPAM 2 MG: 2 TABLET ORAL at 16:52

## 2020-05-03 RX ADMIN — ASPIRIN 81 MG: 81 TABLET, COATED ORAL at 08:33

## 2020-05-03 NOTE — PROGRESS NOTES
Problem: Falls - Risk of  Goal: *Absence of Falls  Description: Document Danne Lobe Fall Risk and appropriate interventions in the flowsheet. Outcome: Progressing Towards Goal  Note: Fall Risk Interventions:  Mobility Interventions: Patient to call before getting OOB         Medication Interventions: Bed/chair exit alarm, Patient to call before getting OOB, Teach patient to arise slowly    Elimination Interventions: Call light in reach, Bed/chair exit alarm    History of Falls Interventions: Bed/chair exit alarm, Investigate reason for fall         Problem: Pressure Injury - Risk of  Goal: *Prevention of pressure injury  Description: Document Gregorio Scale and appropriate interventions in the flowsheet. Outcome: Progressing Towards Goal  Note: Pressure Injury Interventions:  Sensory Interventions: Discuss PT/OT consult with provider, Keep linens dry and wrinkle-free, Float heels, Maintain/enhance activity level, Pressure redistribution bed/mattress (bed type), Turn and reposition approx. every two hours (pillows and wedges if needed)    Moisture Interventions: Internal/External urinary devices    Activity Interventions: Increase time out of bed, PT/OT evaluation    Mobility Interventions: Float heels, HOB 30 degrees or less, PT/OT evaluation, Turn and reposition approx.  every two hours(pillow and wedges)    Nutrition Interventions: Document food/fluid/supplement intake    Friction and Shear Interventions: Lift sheet, Minimize layers, Foam dressings/transparent film/skin sealants                Problem: General Medical Care Plan  Goal: *Vital signs within specified parameters  Outcome: Progressing Towards Goal  Goal: *Labs within defined limits  Outcome: Progressing Towards Goal  Goal: *Absence of infection signs and symptoms  Outcome: Progressing Towards Goal  Goal: *Optimal pain control at patient's stated goal  Outcome: Progressing Towards Goal

## 2020-05-03 NOTE — PROGRESS NOTES
[address] [facility]  Hospitalist Group                                                                                          Hospitalist Progress Note  Tino Dillon MD  Answering service: [phone] or [phone] from in house phone        Date of Service:  5/3/2020  NAME:  Rosemarie Goldman  :  1940  MRN:  [id number]      Admission Summary:     Patient was in her usual state of health until a couple of weeks ago when the patient developed weakness.  The patient described the weakness as generalized weakness, progressive and getting worse.  The patient is unable to walk.  The patient gets meal delivery by [employer] once a day.  The patient stated that she has no appetite and she has not been drinking. Ora Armenta also complained of cough, which is nonproductive, not associated with fever, rigors, or chills.  As a result of the weakness, the patient fell at home a couple of times.  The last fall was about 2 weeks ago.  Complaining of generalized body aches and pain as well as discomfort in her neck.  The patient was last admitted to this hospital from 2019 to 2019.  The patient was admitted and treated for acute CVA.  The patient has left carotid artery stenosis, underwent endarterectomy in the past.  According to the patient, the left carotid artery is now blocked again and the patient is awaiting revascularization of the left carotid artery.  The procedure was postponed because of the COVID-19 pandemic.  When the patient arrived at the emergency room, chest x-ray was obtained.  The chest x-ray was without any acute findings.  The patient was not able to ambulate, was referred to the hospitalist service for evaluation for admission. Interval history / Subjective:       Patient still generally weak. But otherwise denies any pain.      Assessment & Plan:     Generalized weakness  -Status post mechanical fall from weakness  -Differentials include progression of Parkinson, CVA, myelopathy  -Initial head CT without acute abnormality, MRI of the brain and C-spine pending  -Repeat CT pelvis with contrast to rule out fracture, pending  -PT to evaluate once rule out for acute pathology  -Neurology following    Parkinson's disease  -Continue Sinemet  -Neurology following    Coronary artery disease  -With elevated troponin upon admission with EKG changes  -Cardiology evaluating the patient  -Plan for cardiac catheterization once neurological evaluation is completed    Acute kidney injury  -Patient has baseline CKD  -Improving with IV fluid    Hypertension  -Elevated systolic pressures this a.m.  -Given 1 dose of hydralazine 10 mg IV    Atrial fibrillation  -Paroxysmal atrial fibrillation  -Currently in sinus rhythm  -Holding off on anticoagulation for now  -Cardiology following    Carotic artery stenosis  -Plan for outpatient surgery    Dyslipidemia  -Statin    Anxiety and depression  -Stable    Anemia  -Anemia of chronic kidney disease  -H&H stable    Code status: FULL  DVT prophylaxis: Heparin    Care Plan discussed with: Patient/Family and Nurse  Anticipated Disposition: SNF/LTC  Anticipated Discharge: Greater than 48 hours     Hospital Problems  Date Reviewed: 5/1/2020          Codes Class Noted POA    * (Principal) Generalized weakness ICD-10-CM: R53.1  ICD-9-CM: 780.79  4/30/2020 Yes                Review of Systems:   A comprehensive review of systems was negative except for that written in the HPI. Vital Signs:    Last 24hrs VS reviewed since prior progress note.  Most recent are:  Visit Vitals  /70 (BP 1 Location: Right arm, BP Patient Position: At rest)   Pulse 66   Temp 97.8 °F (36.6 °C)   Resp 16   Ht 5' 5\" (1.651 m)   Wt 75.5 kg (166 lb 7.2 oz)   SpO2 95%   BMI 27.70 kg/m²         Intake/Output Summary (Last 24 hours) at 5/3/2020 1451  Last data filed at 5/3/2020 1200  Gross per 24 hour   Intake 920 ml   Output 1750 ml   Net -830 ml        Physical Examination: Constitutional:  No acute distress, cooperative, pleasant    ENT:  Oral mucosa moist, oropharynx benign. Resp:  CTA bilaterally. No wheezing/rhonchi/rales. No accessory muscle use   CV:  Regular rhythm, normal rate, no murmurs, gallops, rubs    GI:  Soft, non distended, non tender. normoactive bowel sounds, no hepatosplenomegaly     Musculoskeletal:  No edema, warm, 2+ pulses throughout    Neurologic:  Moves all extremities. AAOx3, CN II-XII reviewed     Skin:  Good turgor, no rashes or ulcers       Data Review:    Review and/or order of clinical lab test      Labs:     Recent Labs     05/02/20 0314 05/01/20 0637   WBC 7.2 4.6   HGB 10.3* 10.3*   HCT 31.4* 31.1*    208     Recent Labs     05/02/20 0314 05/01/20 0637 04/30/20 1910    138 136   K 4.1 3.8 4.6    108 105   CO2 26 24 25   BUN 23* 28* 32*   CREA 1.35* 1.41* 1.86*   * 153* 153*   CA 9.2 9.5 10.5*   MG 2.0 1.6  --    PHOS  --  2.9  --      Recent Labs     05/01/20 0637 04/30/20 1910   SGOT 28 33   ALT 15 12   AP 88 96   TBILI 0.5 0.9   TP 5.6* 6.6   ALB 2.9* 3.5   GLOB 2.7 3.1     No results for input(s): INR, PTP, APTT, INREXT in the last 72 hours. No results for input(s): FE, TIBC, PSAT, FERR in the last 72 hours. No results found for: FOL, RBCF   No results for input(s): PH, PCO2, PO2 in the last 72 hours.   Recent Labs     05/02/20  0707 05/02/20 0314 05/01/20  2303   TROIQ 1.19* 1.42* 1.58*     Lab Results   Component Value Date/Time    Cholesterol, total 129 05/01/2020 06:37 AM    HDL Cholesterol 36 05/01/2020 06:37 AM    LDL, calculated 74.6 05/01/2020 06:37 AM    Triglyceride 92 05/01/2020 06:37 AM    CHOL/HDL Ratio 3.6 05/01/2020 06:37 AM     Lab Results   Component Value Date/Time    Glucose (POC) 139 (H) 07/26/2019 04:34 PM    Glucose (POC) 126 (H) 07/26/2019 11:01 AM    Glucose (POC) 94 07/12/2019 05:40 PM    Glucose (POC) 135 (H) 02/17/2015 06:46 AM    Glucose (POC) 129 (H) 02/16/2015 09:58 PM     Lab Results   Component Value Date/Time    Color DARK YELLOW 04/30/2020 07:57 PM    Appearance CLEAR 04/30/2020 07:57 PM    Specific gravity 1.025 04/30/2020 07:57 PM    pH (UA) 5.0 04/30/2020 07:57 PM    Protein 100 (A) 04/30/2020 07:57 PM    Glucose Negative 04/30/2020 07:57 PM    Ketone 15 (A) 04/30/2020 07:57 PM    Bilirubin NEGATIVE  07/11/2019 10:17 AM    Urobilinogen 1.0 04/30/2020 07:57 PM    Nitrites Negative 04/30/2020 07:57 PM    Leukocyte Esterase Negative 04/30/2020 07:57 PM    Epithelial cells FEW 04/30/2020 07:57 PM    Bacteria Negative 04/30/2020 07:57 PM    WBC 0-4 04/30/2020 07:57 PM    RBC 0-5 04/30/2020 07:57 PM         Medications Reviewed:     Current Facility-Administered Medications   Medication Dose Route Frequency    senna-docusate (PERICOLACE) 8.6-50 mg per tablet 1 Tab  1 Tab Oral DAILY    polyethylene glycol (MIRALAX) packet 17 g  17 g Oral DAILY    ARIPiprazole (ABILIFY) tablet 15 mg  15 mg Oral DAILY    aspirin delayed-release tablet 81 mg  81 mg Oral DAILY    atorvastatin (LIPITOR) tablet 40 mg  40 mg Oral QHS    carbidopa-levodopa (SINEMET)  mg per tablet 2 Tab  2 Tab Oral QID    carvediloL (COREG) tablet 25 mg  25 mg Oral BID WITH MEALS    clopidogreL (PLAVIX) tablet 75 mg  75 mg Oral DAILY AFTER BREAKFAST    cyanocobalamin (VITAMIN B12) tablet 100 mcg  100 mcg Oral DAILY    diazePAM (VALIUM) tablet 2 mg  2 mg Oral TID    DULoxetine (CYMBALTA) capsule 120 mg  120 mg Oral DAILY    hydrALAZINE (APRESOLINE) tablet 25 mg  25 mg Oral BID    pantoprazole (PROTONIX) tablet 40 mg  40 mg Oral ACB    nitroglycerin (NITROSTAT) tablet 0.4 mg  0.4 mg SubLINGual Q5MIN PRN    sodium chloride (NS) flush 5-40 mL  5-40 mL IntraVENous Q8H    sodium chloride (NS) flush 5-40 mL  5-40 mL IntraVENous PRN    ondansetron (ZOFRAN) injection 4 mg  4 mg IntraVENous Q4H PRN    bisacodyL (DULCOLAX) tablet 5 mg  5 mg Oral DAILY PRN    heparin (porcine) injection 5,000 Units  5,000 Units SubCUTAneous Q8H    acetaminophen (TYLENOL) tablet 650 mg  650 mg Oral Q4H PRN    lactated Ringers infusion  75 mL/hr IntraVENous CONTINUOUS     ______________________________________________________________________  EXPECTED LENGTH OF STAY: - - -  ACTUAL LENGTH OF STAY:          3                 Morenita Mendez MD

## 2020-05-03 NOTE — PROGRESS NOTES
Bedside shift change report given to Bela Lang (oncoming nurse) by Cristofer Kay (offgoing nurse). Report included the following information SBAR, Kardex, Procedure Summary, MAR, Accordion, Recent Results, Cardiac Rhythm SB/SA and Dual Neuro Assessment.

## 2020-05-03 NOTE — PROGRESS NOTES
Chart reviewed and per PT note, patient refused  \"stating that she does not want to move at all right now\". Also reported per admitting MD, PT and OT to work with patient following CT and MRI. Noted patient has gotten CT however is awaiting MRI at this time. Will follow up tomorrow for OT evaluation as able. Thank you.

## 2020-05-03 NOTE — PROGRESS NOTES
[address] [facility] Adult  Hospitalist Group                                                                                          Hospitalist Progress Note  Nicki Magallanes MD  Answering service: [phone] from in house phone        Date of Service:  2020  NAME:  Dionte Lucio  :  1940  MRN:  [id number]      Admission Summary: This is a 81-year-old woman with a past medical history significant for Parkinson's disease; coronary artery disease, status post stent placement; hypertension; dyslipidemia; anxiety/depression; chronic kidney disease; left carotid artery stenosis, status post endarterectomy; status post CVA, was in her usual state of health until a couple of weeks ago when the patient developed weakness. The patient described the weakness as generalized weakness, progressive and getting worse. The patient is unable to walk. The patient gets meal delivery by [employer] once a day. The patient stated that she has no appetite and she has not been drinking. She also complained of cough, which is nonproductive, not associated with fever, rigors, or chills. As a result of the weakness, the patient fell at home a couple of times. The last fall was about 2 weeks ago. Complaining of generalized body aches and pain as well as discomfort in her neck. The patient was last admitted to this hospital from 2019 to 2019. The patient was admitted and treated for acute CVA. The patient has left carotid artery stenosis, underwent endarterectomy in the past.  According to the patient, the left carotid artery is now blocked again and the patient is awaiting revascularization of the left carotid artery. The procedure was postponed because of the COVID-19 pandemic. When the patient arrived at the emergency room, chest x-ray was obtained. The chest x-ray was without any acute findings.   The patient was not able to ambulate, was referred to the hospitalist service for evaluation for admission. There was also worsening of renal function.       Interval history / Subjective:     F/u Generalized weakness/multiple falls  Feels constipated, otherwise no complaints  Not complaining of chest pain, SOB, headache, dizziness  COVID negative     Assessment & Plan:     Generalized weakness/multiple falls  -sec to ? CVA vs progression of Parkinson's  -CT head 5/2 No evidence of acute intracranial abnormality by this modality  -MRI cervical spine to rule out myelopathy  -CT pelvis to rule out fracture  -Appreciate Neurology  -PT/OT    Parkinson's disease  -Continue home meds    Possible NSTEMI  -History of Coronary artery disease, status post CABG  -noted EKG changes, elevated troponins  -Follow Echo  -Appreciate discussion with Cardiology    MORENO over CKD stage IV  -improved with IV fluids  -Now at baseline    Hypertension  -Continue home meds    Dyslipidemia  -home meds    Anxiety/depression  -stable    Left carotid artery stenosis. -s/p endarterectomy.    -The patient is now awaiting revascularization of the left carotid artery because of the occlusion. -CTA neck 2/2020 moderate to severe right and left ICA stenosis. Hypercalcemia.    -Continue fluids    Suspected COVID-19 virus infection  -CXR 4/30 No acute findings  -follow results  -droplet plus    Paroxysmal atrial fibrillation  -Follow cardiology     Cardiac diet    Code status: FULL CODE  DVT prophylaxis: heparin    Plan: MRI brain, echo. Follow Cardiology/Neurology. Discharge soon    Care Plan discussed with: Patient/Family  Anticipated Disposition: Home w/Family  Anticipated Discharge: 24 hours to 48 hours     Hospital Problems  Date Reviewed: 5/1/2020          Codes Class Noted POA    * (Principal) Generalized weakness ICD-10-CM: R53.1  ICD-9-CM: 780.79  4/30/2020 Yes                Review of Systems:   A comprehensive review of systems was negative except for that written in the HPI.        Vital Signs:    Last 24hrs VS reviewed since prior progress note. Most recent are:  Visit Vitals  /88 (BP 1 Location: Left arm, BP Patient Position: At rest)   Pulse (!) 53   Temp 97.6 °F (36.4 °C)   Resp 20   Ht 5' 5\" (1.651 m)   Wt 71 kg (156 lb 8.4 oz)   SpO2 99%   BMI 26.05 kg/m²         Intake/Output Summary (Last 24 hours) at 5/2/2020 1350  Last data filed at 5/2/2020 1312  Gross per 24 hour   Intake 3375 ml   Output 1100 ml   Net 2275 ml        Physical Examination:             Constitutional:  No acute distress, cooperative, pleasant    ENT:  Oral mucosa moist, oropharynx benign. Resp:  CTA bilaterally. No wheezing/rhonchi/rales. No accessory muscle use   CV:  Regular rhythm, normal rate, no murmurs, gallops, rubs    GI:  Soft, non distended, non tender. normoactive bowel sounds, no hepatosplenomegaly     Musculoskeletal:  No edema, warm, 2+ pulses throughout    Neurologic:  Moves all extremities. AAOx3, CN II-XII reviewed     Skin:  Good turgor, no rashes or ulcers       Data Review:    Review and/or order of clinical lab test      Labs:     Recent Labs     05/02/20 0314 05/01/20 0637   WBC 7.2 4.6   HGB 10.3* 10.3*   HCT 31.4* 31.1*    208     Recent Labs     05/02/20 0314 05/01/20 0637 04/30/20 1910    138 136   K 4.1 3.8 4.6    108 105   CO2 26 24 25   BUN 23* 28* 32*   CREA 1.35* 1.41* 1.86*   * 153* 153*   CA 9.2 9.5 10.5*   MG 2.0 1.6  --    PHOS  --  2.9  --      Recent Labs     05/01/20 0637 04/30/20 1910   SGOT 28 33   ALT 15 12   AP 88 96   TBILI 0.5 0.9   TP 5.6* 6.6   ALB 2.9* 3.5   GLOB 2.7 3.1     No results for input(s): INR, PTP, APTT, INREXT, INREXT in the last 72 hours. No results for input(s): FE, TIBC, PSAT, FERR in the last 72 hours. No results found for: FOL, RBCF   No results for input(s): PH, PCO2, PO2 in the last 72 hours.   Recent Labs     05/02/20  0707 05/02/20  0314 05/01/20  2303   TROIQ 1.19* 1.42* 1.58*     Lab Results   Component Value Date/Time Cholesterol, total 129 05/01/2020 06:37 AM    HDL Cholesterol 36 05/01/2020 06:37 AM    LDL, calculated 74.6 05/01/2020 06:37 AM    Triglyceride 92 05/01/2020 06:37 AM    CHOL/HDL Ratio 3.6 05/01/2020 06:37 AM     Lab Results   Component Value Date/Time    Glucose (POC) 139 (H) 07/26/2019 04:34 PM    Glucose (POC) 126 (H) 07/26/2019 11:01 AM    Glucose (POC) 94 07/12/2019 05:40 PM    Glucose (POC) 135 (H) 02/17/2015 06:46 AM    Glucose (POC) 129 (H) 02/16/2015 09:58 PM     Lab Results   Component Value Date/Time    Color DARK YELLOW 04/30/2020 07:57 PM    Appearance CLEAR 04/30/2020 07:57 PM    Specific gravity 1.025 04/30/2020 07:57 PM    pH (UA) 5.0 04/30/2020 07:57 PM    Protein 100 (A) 04/30/2020 07:57 PM    Glucose Negative 04/30/2020 07:57 PM    Ketone 15 (A) 04/30/2020 07:57 PM    Bilirubin NEGATIVE  07/11/2019 10:17 AM    Urobilinogen 1.0 04/30/2020 07:57 PM    Nitrites Negative 04/30/2020 07:57 PM    Leukocyte Esterase Negative 04/30/2020 07:57 PM    Epithelial cells FEW 04/30/2020 07:57 PM    Bacteria Negative 04/30/2020 07:57 PM    WBC 0-4 04/30/2020 07:57 PM    RBC 0-5 04/30/2020 07:57 PM         Medications Reviewed:     Current Facility-Administered Medications   Medication Dose Route Frequency    senna-docusate (PERICOLACE) 8.6-50 mg per tablet 1 Tab  1 Tab Oral DAILY    ARIPiprazole (ABILIFY) tablet 15 mg  15 mg Oral DAILY    aspirin delayed-release tablet 81 mg  81 mg Oral DAILY    atorvastatin (LIPITOR) tablet 40 mg  40 mg Oral QHS    carbidopa-levodopa (SINEMET)  mg per tablet 2 Tab  2 Tab Oral QID    carvediloL (COREG) tablet 25 mg  25 mg Oral BID WITH MEALS    clopidogreL (PLAVIX) tablet 75 mg  75 mg Oral DAILY AFTER BREAKFAST    cyanocobalamin (VITAMIN B12) tablet 100 mcg  100 mcg Oral DAILY    diazePAM (VALIUM) tablet 2 mg  2 mg Oral TID    DULoxetine (CYMBALTA) capsule 120 mg  120 mg Oral DAILY    hydrALAZINE (APRESOLINE) tablet 25 mg  25 mg Oral BID    pantoprazole (PROTONIX) tablet 40 mg  40 mg Oral ACB    nitroglycerin (NITROSTAT) tablet 0.4 mg  0.4 mg SubLINGual Q5MIN PRN    sodium chloride (NS) flush 5-40 mL  5-40 mL IntraVENous Q8H    sodium chloride (NS) flush 5-40 mL  5-40 mL IntraVENous PRN    ondansetron (ZOFRAN) injection 4 mg  4 mg IntraVENous Q4H PRN    bisacodyL (DULCOLAX) tablet 5 mg  5 mg Oral DAILY PRN    heparin (porcine) injection 5,000 Units  5,000 Units SubCUTAneous Q8H    acetaminophen (TYLENOL) tablet 650 mg  650 mg Oral Q4H PRN    lactated Ringers infusion  100 mL/hr IntraVENous CONTINUOUS     ______________________________________________________________________  EXPECTED LENGTH OF STAY: - - -  ACTUAL LENGTH OF STAY:          2                 Luan Tyler MD

## 2020-05-03 NOTE — PROGRESS NOTES
Bedside and Verbal shift change report given to Cristofer Kay RN (oncoming nurse) by Fer Gonzales RN (offgoing nurse).  Report included the following information SBAR, Kardex, Intake/Output, MAR, Recent Results and Cardiac Rhythm SR/ SB.

## 2020-05-03 NOTE — PROGRESS NOTES
[address] [place] [address]               Division of Cardiology  [phone]                       Progress note              Ethel Bertrand M.D., F.A.C.C. NAME:  Adi Bean   :   1940   MRN:   [id number]         ICD-10-CM ICD-9-CM    1. Weakness R53.1 780.79    2. MORENO (acute kidney injury) ([facility] [address] N17.9 584.9                  HPI she is still feeling weak. She denies any chest pain or shortness of breath. No dizziness reported. Assessment/Plan:    1. CAD: She remains again seemingly asymptomatic. We have discussed today again the elevation in troponin which remains unclear in its etiology. She does have a history of coronary artery disease and electrocardiogram on admission revealed more pronounced T wave inversion in the lateral leads    This was discussed with the patient. I have recommended to proceed with cardiac catheterization based on the above. She wants to hold off at this point and she will let me know. We have discussed as weakness times in women may be related to underlying coronary disease. For the moment continue aspirin Plavix Coreg and statin. Will rediscuss again tomorrow and of course we will wait  for  Neurological evaluation  to be completed before making any final decision regarding cardiac catheterization. 2.  Paroxysmal atrial fibrillation: She remains in normal sinus rhythm to sinus bradycardia. Coreg was increased yesterday. Clearly this can contribute to her fatigue. We will need to keep an eye on her heart rate in case Coreg needs to be  decreased. Proceed with echocardiogram    3. Hypertension: Hypertensive started on hydralazine by primary team will defer to them for further treatment and remain available as needed. 4.  Weakness: Neurological evaluation in progress. Head CT noted. MRI pending at this time.     5.  Carotid artery stenosis: 80% stenosis of the right carotid artery. Surgery on hold for now. CARDIAC STUDIES      07/11/19   ECHO ADULT COMPLETE 07/11/2019 7/11/2019    Narrative · Left Ventricle: Normal cavity size and systolic function (ejection   fraction normal). Mild concentric hypertrophy. Estimated left ventricular   ejection fraction is 61 - 65%. No regional wall motion abnormality noted. Mild (grade 1) left ventricular diastolic dysfunction. · Aortic Valve: Aortic valve sclerosis with stenosis. Aortic valve mean   gradient is 9.2 mmHg. Aortic valve area is 1.8 cm2. Mild aortic valve   stenosis is present. Mild aortic valve regurgitation is present. · Mitral Valve: Moderate mitral annular calcification. Mild mitral valve   regurgitation. · Left Atrium: Moderately dilated left atrium. · Pulmonary Artery: There is no evidence of pulmonary hypertension. Signed by: Gallito Richmodn MD       02/09/15   NM CARDIAC SPECT W STRS/REST MULT 02/10/2015 2/10/2015    Narrative **Final Report**       ICD Codes / Adm. Diagnosis: E888.9  427.89 / Unspecified fall  Other   specified cardiac dysrhy  Examination:  NM CARD SPECT MULT WALL EF  - [id number] - Feb 10 2015 11:05AM  Accession No:  [id number]  Reason:  cad      REPORT:  EXAM:  NM CARD SPECT MULT WALL EF    INDICATION: cad    COMPARISON:        TRACER: Tc 99m sestamibi    TECHNIQUE:  Resting SPECT images of the heart were obtained following the   uneventful intravenous administration of 11 mCi of Tc 99m sestamibi. Gated stress SPECT images of the heart were obtained following LexiScan   protocol and the uneventful intravenous administration of 32 mCi of Tc 99m   sestamibi. FINDINGS: At stress, there is diminished activity in the apex and inferior   wall which is mildly improved rest..     Left ventricular wall motion is normal except for slight diminished wall   thickening inferior left ventricle. Left ventricular ejection fraction is 70   %.         IMPRESSION:   At stress, there is diminished activity in the apex and inferior wall which   is mildly improved at rest suspicious for ischemia. Left ventricular   ejection fraction is 70%. The floor was notified of the results. Ava Montesinos              Signing/Reading Doctor: Ta Harden ([id number])    Corie Koroma ([id number])  Feb 10 2015  3:28PM                                  Signed by: Wei Schroeder MD             EKG Results     Procedure 720 Value Units Date/Time    EKG, 12 LEAD, INITIAL [[id number]] Collected:  05/01/20 0442    Order Status:  Completed Updated:  05/01/20 1353     Ventricular Rate 108 BPM      Atrial Rate 119 BPM      QRS Duration 106 ms      Q-T Interval 356 ms      QTC Calculation (Bezet) 477 ms      Calculated R Axis -46 degrees      Calculated T Axis 144 degrees      Diagnosis --     Atrial fibrillation with rapid ventricular response  Left axis deviation  Incomplete left bundle branch block  Moderate voltage criteria for LVH, may be normal variant  ST & T wave abnormality, consider lateral ischemia or digitalis effect  When compared with ECG of 30-APR-2020 19:29,  Atrial fibrillation has replaced Sinus rhythm  Vent.  rate has increased BY  50 BPM  Incomplete left bundle branch block is now present  ST now depressed in Lateral leads  Confirmed by Nuria Powell M.D., [facility] ([id number]) on 5/1/2020 1:53:42 PM      EKG 12 LEAD INITIAL [[id number]] Collected:  04/30/20 1929    Order Status:  Completed Updated:  05/01/20 1348     Ventricular Rate 58 BPM      Atrial Rate 58 BPM      P-R Interval 158 ms      QRS Duration 100 ms      Q-T Interval 466 ms      QTC Calculation (Bezet) 457 ms      Calculated P Axis 9 degrees      Calculated R Axis -49 degrees      Calculated T Axis 117 degrees      Diagnosis --     Sinus bradycardia  Left anterior fascicular block  Voltage criteria for left ventricular hypertrophy  T wave abnormality, consider lateral ischemia  When compared with ECG of 18-MAR-2020 08:31,  T wave inversion more evident in Lateral leads  Confirmed by Joel Thayer M.D., [place] ([id number]) on 5/1/2020 1:48:33 PM              IMAGING      CT Results (most recent):  Results from [place] encounter on 04/30/20   CT PELV WO CONT    Narrative CT PELVIS  WITHOUT CONTRAST. 5/2/2020 12:21 PM     INDICATION: Fall. COMPARISON: 6/3/2014. TECHNIQUE: CT of the pelvis was performed without contrast. CT dose reduction  was achieved through use of a standardized protocol tailored for this  examination and automatic exposure control for dose modulation. Adaptive  statistical iterative reconstruction (ASIR) was utilized. FINDINGS:  No displaced fracture. The patient is osteopenic. A 42 mm AAA is partially imaged. Presacral edema is associated with a large  stool ball impacted in the rectum. This has mass effect on the uterus and  bladder. Impression IMPRESSION:  1. No displaced fracture. 2. Rectal fecal impaction. 3. AAA. XR Results (most recent):  Results from Hospital Encounter encounter on 04/30/20   XR CHEST PORT    Narrative EXAM: XR CHEST PORT    INDICATION: weakness    COMPARISON: 3/18/2020    FINDINGS: A portable AP radiograph of the chest was obtained at 1923 hours. There is no pneumothorax or pleural effusion. The lungs are clear. Cardiac,  mediastinal and hilar contours are stable with unchanged mild-moderate  atherosclerotic thoracic aortic tortuosity. Superior median sternotomy wires  are again shown. The bones are severely osteopenic. Impression IMPRESSION: No acute findings. MRI Results (most recent):            Visit Vitals  /56   Pulse (!) 58   Temp 97.8 °F (36.6 °C)   Resp 17   Ht 5' 5\" (1.651 m)   Wt 166 lb 7.2 oz (75.5 kg)   SpO2 94%   BMI 27.70 kg/m²         Wt Readings from Last 3 Encounters:   05/03/20 166 lb 7.2 oz (75.5 kg)   05/01/20 162 lb (73.5 kg)   03/18/20 160 lb (72.6 kg)         Review of Systems:   Pertinent items are noted in the History of Present Illness.          No intake/output data recorded. 05/01 1901 - 05/03 0700  In: 8987 [P.O.:240;  I.V.:2600]  Out: 1750 [Urine:1750]      Telemetry: normal sinus rhythm    Physical Exam:        Current Facility-Administered Medications   Medication Dose Route Frequency    senna-docusate (PERICOLACE) 8.6-50 mg per tablet 1 Tab  1 Tab Oral DAILY    polyethylene glycol (MIRALAX) packet 17 g  17 g Oral DAILY    ARIPiprazole (ABILIFY) tablet 15 mg  15 mg Oral DAILY    aspirin delayed-release tablet 81 mg  81 mg Oral DAILY    atorvastatin (LIPITOR) tablet 40 mg  40 mg Oral QHS    carbidopa-levodopa (SINEMET)  mg per tablet 2 Tab  2 Tab Oral QID    carvediloL (COREG) tablet 25 mg  25 mg Oral BID WITH MEALS    clopidogreL (PLAVIX) tablet 75 mg  75 mg Oral DAILY AFTER BREAKFAST    cyanocobalamin (VITAMIN B12) tablet 100 mcg  100 mcg Oral DAILY    diazePAM (VALIUM) tablet 2 mg  2 mg Oral TID    DULoxetine (CYMBALTA) capsule 120 mg  120 mg Oral DAILY    hydrALAZINE (APRESOLINE) tablet 25 mg  25 mg Oral BID    pantoprazole (PROTONIX) tablet 40 mg  40 mg Oral ACB    nitroglycerin (NITROSTAT) tablet 0.4 mg  0.4 mg SubLINGual Q5MIN PRN    sodium chloride (NS) flush 5-40 mL  5-40 mL IntraVENous Q8H    sodium chloride (NS) flush 5-40 mL  5-40 mL IntraVENous PRN    ondansetron (ZOFRAN) injection 4 mg  4 mg IntraVENous Q4H PRN    bisacodyL (DULCOLAX) tablet 5 mg  5 mg Oral DAILY PRN    heparin (porcine) injection 5,000 Units  5,000 Units SubCUTAneous Q8H    acetaminophen (TYLENOL) tablet 650 mg  650 mg Oral Q4H PRN    lactated Ringers infusion  100 mL/hr IntraVENous CONTINUOUS         All Cardiac Markers in the last 24 hours:  No results found for: CPK, CK, CKMMB, CKMB, RCK3, CKMBT, CKMBPOC, CKNDX, CKND1, MONSTER, TROPT, TROIQ, ANABELLE, TROPT, TNIPOC, BNP, BNPP, BNPNT     Lab Results   Component Value Date/Time    Creatinine (POC) 1.6 (H) 02/24/2020 10:32 AM    Creatinine 1.35 (H) 05/02/2020 03:14 AM          Lab Results   Component Value Date/Time    CK 75 07/11/2019 09:35 AM    CK - MB 2.7 07/11/2019 09:35 AM    CK-MB Index 3.6 (H) 07/11/2019 09:35 AM    Troponin-I, Qt. 1.19 (H) 05/02/2020 07:07 AM     (H) 06/08/2014 04:12 AM         Lab Results   Component Value Date/Time    WBC 7.2 05/02/2020 03:14 AM    HGB 10.3 (L) 05/02/2020 03:14 AM    HCT 31.4 (L) 05/02/2020 03:14 AM    PLATELET 220 57/75/9467 03:14 AM    MCV 91.8 05/02/2020 03:14 AM         Lab Results   Component Value Date/Time    Sodium 138 05/02/2020 03:14 AM    Potassium 4.1 05/02/2020 03:14 AM    Chloride 108 05/02/2020 03:14 AM    CO2 26 05/02/2020 03:14 AM    Anion gap 4 (L) 05/02/2020 03:14 AM    Glucose 143 (H) 05/02/2020 03:14 AM    BUN 23 (H) 05/02/2020 03:14 AM    Creatinine 1.35 (H) 05/02/2020 03:14 AM    BUN/Creatinine ratio 17 05/02/2020 03:14 AM    GFR est AA 46 (L) 05/02/2020 03:14 AM    GFR est non-AA 38 (L) 05/02/2020 03:14 AM    Calcium 9.2 05/02/2020 03:14 AM         Lab Results   Component Value Date/Time     (H) 06/08/2014 04:12 AM     (H) 06/04/2014 04:44 AM    NT pro-BNP 1,941 (H) 07/11/2019 09:35 AM         Lab Results   Component Value Date/Time    Cholesterol, total 129 05/01/2020 06:37 AM    HDL Cholesterol 36 05/01/2020 06:37 AM    LDL, calculated 74.6 05/01/2020 06:37 AM    VLDL, calculated 18.4 05/01/2020 06:37 AM    Triglyceride 92 05/01/2020 06:37 AM    CHOL/HDL Ratio 3.6 05/01/2020 06:37 AM         Lab Results   Component Value Date/Time    ALT (SGPT) 15 05/01/2020 06:37 AM    AST (SGOT) 28 05/01/2020 06:37 AM    Alk.  phosphatase 88 05/01/2020 06:37 AM    Bilirubin, total 0.5 05/01/2020 06:37 AM

## 2020-05-03 NOTE — PROGRESS NOTES
Attempted to see patient for PT assessment, but she refused, stating that she does not want to move at all right now. Discussed importance of mobility and offered multiple levels of activity, but she was not willing to try and closed here eyes and would not open them back up. She denies any specific complaints. She does report a history of falls (as recently as a week ago) at home ([place]) and that she uses a RW for mobility. We will follow up with PT assessment tomorrow when patient is willing to participate.     Alberto Barrow, PT

## 2020-05-04 ENCOUNTER — APPOINTMENT (OUTPATIENT)
Dept: NON INVASIVE DIAGNOSTICS | Age: 80
DRG: 057 | End: 2020-05-04
Attending: HOSPITALIST
Payer: MEDICARE

## 2020-05-04 PROBLEM — R53.1 WEAKNESS: Status: ACTIVE | Noted: 2020-05-04

## 2020-05-04 LAB
ANION GAP SERPL CALC-SCNC: 6 MMOL/L (ref 5–15)
AV PEAK GRADIENT: 85.83 MMHG
BUN SERPL-MCNC: 16 MG/DL (ref 6–20)
BUN/CREAT SERPL: 12 (ref 12–20)
CALCIUM SERPL-MCNC: 9.7 MG/DL (ref 8.5–10.1)
CHLORIDE SERPL-SCNC: 107 MMOL/L (ref 97–108)
CO2 SERPL-SCNC: 26 MMOL/L (ref 21–32)
CREAT SERPL-MCNC: 1.34 MG/DL (ref 0.55–1.02)
ECHO AV REGURGITANT PHT: 407.6 CM
ECHO LA AREA 4C: 31 CM2
ECHO LA VOL 4C: 117.62 ML (ref 22–52)
ECHO LA VOLUME INDEX A4C: 64.4 ML/M2 (ref 16–28)
ECHO LV E' LATERAL VELOCITY: 7.2 CM/S
ECHO LVOT DIAM: 2 CM
ECHO LVOT PEAK GRADIENT: 6.3 MMHG
ECHO LVOT PEAK VELOCITY: 125.29 CM/S
ECHO MV A VELOCITY: 88.48 CM/S
ECHO MV E VELOCITY: 114.99 CM/S
ECHO MV E/A RATIO: 1.3
ECHO MV E/E' LATERAL: 15.97
ECHO RV INTERNAL DIMENSION: 4.11 CM
ECHO RV TAPSE: 1.54 CM (ref 1.5–2)
ECHO TV REGURGITANT MAX VELOCITY: 484.55 CM/S
ECHO TV REGURGITANT PEAK GRADIENT: 93.9 MMHG
GLUCOSE SERPL-MCNC: 143 MG/DL (ref 65–100)
PISA AR MAX VEL: 463.24 CM/S
POTASSIUM SERPL-SCNC: 4.2 MMOL/L (ref 3.5–5.1)
SODIUM SERPL-SCNC: 139 MMOL/L (ref 136–145)

## 2020-05-04 PROCEDURE — 74011250636 HC RX REV CODE- 250/636: Performed by: INTERNAL MEDICINE

## 2020-05-04 PROCEDURE — 36415 COLL VENOUS BLD VENIPUNCTURE: CPT

## 2020-05-04 PROCEDURE — 93306 TTE W/DOPPLER COMPLETE: CPT

## 2020-05-04 PROCEDURE — 80048 BASIC METABOLIC PNL TOTAL CA: CPT

## 2020-05-04 PROCEDURE — 74011250637 HC RX REV CODE- 250/637: Performed by: HOSPITALIST

## 2020-05-04 PROCEDURE — 97530 THERAPEUTIC ACTIVITIES: CPT

## 2020-05-04 PROCEDURE — 74011250636 HC RX REV CODE- 250/636: Performed by: FAMILY MEDICINE

## 2020-05-04 PROCEDURE — 74011250637 HC RX REV CODE- 250/637: Performed by: INTERNAL MEDICINE

## 2020-05-04 PROCEDURE — 74011250637 HC RX REV CODE- 250/637: Performed by: SPECIALIST

## 2020-05-04 PROCEDURE — 77030038269 HC DRN EXT URIN PURWCK BARD -A

## 2020-05-04 PROCEDURE — 97165 OT EVAL LOW COMPLEX 30 MIN: CPT

## 2020-05-04 PROCEDURE — 65270000029 HC RM PRIVATE

## 2020-05-04 PROCEDURE — 94760 N-INVAS EAR/PLS OXIMETRY 1: CPT

## 2020-05-04 PROCEDURE — 97161 PT EVAL LOW COMPLEX 20 MIN: CPT

## 2020-05-04 RX ORDER — HYDRALAZINE HYDROCHLORIDE 25 MG/1
25 TABLET, FILM COATED ORAL 3 TIMES DAILY
Status: DISCONTINUED | OUTPATIENT
Start: 2020-05-04 | End: 2020-05-05

## 2020-05-04 RX ORDER — METOPROLOL SUCCINATE 50 MG/1
50 TABLET, EXTENDED RELEASE ORAL
Status: DISCONTINUED | OUTPATIENT
Start: 2020-05-04 | End: 2020-05-11 | Stop reason: HOSPADM

## 2020-05-04 RX ADMIN — SODIUM CHLORIDE, SODIUM LACTATE, POTASSIUM CHLORIDE, AND CALCIUM CHLORIDE 75 ML/HR: 600; 310; 30; 20 INJECTION, SOLUTION INTRAVENOUS at 06:49

## 2020-05-04 RX ADMIN — DIAZEPAM 2 MG: 2 TABLET ORAL at 18:29

## 2020-05-04 RX ADMIN — DULOXETINE HYDROCHLORIDE 120 MG: 60 CAPSULE, DELAYED RELEASE PELLETS ORAL at 09:24

## 2020-05-04 RX ADMIN — CARBIDOPA AND LEVODOPA 2 TABLET: 25; 100 TABLET ORAL at 21:30

## 2020-05-04 RX ADMIN — HEPARIN SODIUM 5000 UNITS: 5000 INJECTION INTRAVENOUS; SUBCUTANEOUS at 18:25

## 2020-05-04 RX ADMIN — HEPARIN SODIUM 5000 UNITS: 5000 INJECTION INTRAVENOUS; SUBCUTANEOUS at 03:05

## 2020-05-04 RX ADMIN — DIAZEPAM 2 MG: 2 TABLET ORAL at 09:39

## 2020-05-04 RX ADMIN — Medication 10 ML: at 21:30

## 2020-05-04 RX ADMIN — DIAZEPAM 2 MG: 2 TABLET ORAL at 21:30

## 2020-05-04 RX ADMIN — HYDRALAZINE HYDROCHLORIDE 25 MG: 25 TABLET, FILM COATED ORAL at 09:24

## 2020-05-04 RX ADMIN — SODIUM CHLORIDE, SODIUM LACTATE, POTASSIUM CHLORIDE, AND CALCIUM CHLORIDE 75 ML/HR: 600; 310; 30; 20 INJECTION, SOLUTION INTRAVENOUS at 20:22

## 2020-05-04 RX ADMIN — ASPIRIN 81 MG: 81 TABLET, COATED ORAL at 09:24

## 2020-05-04 RX ADMIN — CARBIDOPA AND LEVODOPA 2 TABLET: 25; 100 TABLET ORAL at 13:54

## 2020-05-04 RX ADMIN — HEPARIN SODIUM 5000 UNITS: 5000 INJECTION INTRAVENOUS; SUBCUTANEOUS at 13:00

## 2020-05-04 RX ADMIN — SENNOSIDES AND DOCUSATE SODIUM 1 TABLET: 8.6; 5 TABLET ORAL at 09:24

## 2020-05-04 RX ADMIN — CARBIDOPA AND LEVODOPA 2 TABLET: 25; 100 TABLET ORAL at 09:24

## 2020-05-04 RX ADMIN — ARIPIPRAZOLE 15 MG: 10 TABLET ORAL at 09:40

## 2020-05-04 RX ADMIN — CLOPIDOGREL BISULFATE 75 MG: 75 TABLET ORAL at 09:24

## 2020-05-04 RX ADMIN — CARBIDOPA AND LEVODOPA 2 TABLET: 25; 100 TABLET ORAL at 18:25

## 2020-05-04 RX ADMIN — VITAM B12 100 MCG: 100 TAB at 09:24

## 2020-05-04 RX ADMIN — Medication 10 ML: at 06:49

## 2020-05-04 RX ADMIN — Medication 10 ML: at 15:00

## 2020-05-04 RX ADMIN — CARVEDILOL 25 MG: 12.5 TABLET, FILM COATED ORAL at 09:24

## 2020-05-04 RX ADMIN — PANTOPRAZOLE SODIUM 40 MG: 40 TABLET, DELAYED RELEASE ORAL at 06:49

## 2020-05-04 RX ADMIN — HYDRALAZINE HYDROCHLORIDE 25 MG: 25 TABLET, FILM COATED ORAL at 18:25

## 2020-05-04 RX ADMIN — ATORVASTATIN CALCIUM 40 MG: 40 TABLET, FILM COATED ORAL at 21:30

## 2020-05-04 NOTE — PROGRESS NOTES
Patient remains reluctant to proceed with cardiac catheterization  Echo with normal ef and no gross rwma  Proceed with nuclear stress test tomorrow (also given complete lack of cardiac symptoms) to ascertain potential ischemic burden    04/30/20   ECHO ADULT COMPLETE 05/04/2020 5/4/2020    Narrative · Image quality for this study was technically difficult. · Normal cavity size and systolic function (ejection fraction normal). Upper normal wall thickness. Estimated left ventricular ejection fraction   is 55 - 60%. No regional wall motion abnormality noted. Mild (grade 1)   left ventricular diastolic dysfunction. · Moderately dilated left atrium. · Mildly reduced systolic function. · Mildly dilated right atrium. · Mild aortic valve regurgitation is present. · Mitral valve non-specific thickening. Moderate mitral annular   calcification. Moderate mitral valve regurgitation is present. · Mild tricuspid valve regurgitation is present. · Moderate pulmonary hypertension. Pulmonary arterial systolic pressure is   50 mmHg.         Signed by: Claudell Bender, MD     \

## 2020-05-04 NOTE — ROUTINE PROCESS
Bedside and Verbal shift change report given to LAMONT Chavez  (oncoming nurse) by Chan Hansen RN  (offgoing nurse). Report included the following information SBAR, Kardex, ED Summary, Procedure Summary, Intake/Output, MAR, Recent Results, Med Rec Status, Cardiac Rhythm NSR. , Alarm Parameters , Quality Measures and Dual Neuro Assessment.

## 2020-05-04 NOTE — PROGRESS NOTES
Occupational Therapy  05/04/20    Orders received, chart reviewed and patient evaluated by occupational therapy. Pending progression with skilled acute occupational therapy, recommend:  Therapy up to 5 days/week in SNF setting vs transition to [facility]. Recommend with nursing patient to complete as able in order to maintain strength, endurance and independence: OOB to chair 3x/day and functional mobility to the [facility] with RW and 2 assist. Thank you for your assistance. Full evaluation to follow.     Thank you,   Sidra Manjarrez, GUTIERREZ, OTR/L

## 2020-05-04 NOTE — PROGRESS NOTES
[facility] Adult  Hospitalist Group                                                                                          Hospitalist Progress Note  Tino Dillon MD  Answering service: [phone] OR [phone] from in house phone        Date of Service:  2020  NAME:  Rosemarie Goldman  :  1940  MRN:  [id number]      Admission Summary:     Patient was in her usual state of health until a couple of weeks ago when the patient developed weakness.  The patient described the weakness as generalized weakness, progressive and getting worse.  The patient is unable to walk.  The patient gets meal delivery by [employer] once a day.  The patient stated that she has no appetite and she has not been drinking. Ora Armenta also complained of cough, which is nonproductive, not associated with fever, rigors, or chills.  As a result of the weakness, the patient fell at home a couple of times.  The last fall was about 2 weeks ago.  Complaining of generalized body aches and pain as well as discomfort in her neck.  The patient was last admitted to this hospital from 2019 to 2019.  The patient was admitted and treated for acute CVA.  The patient has left carotid artery stenosis, underwent endarterectomy in the past.  According to the patient, the left carotid artery is now blocked again and the patient is awaiting revascularization of the left carotid artery.  The procedure was postponed because of the COVID-19 pandemic.  When the patient arrived at the emergency room, chest x-ray was obtained.  The chest x-ray was without any acute findings.  The patient was not able to ambulate, was referred to the hospitalist service for evaluation for admission. Interval history / Subjective:       Patient still generally weak. But otherwise denies any pain.      Assessment & Plan:     Generalized weakness  -Status post mechanical fall from weakness  -Differentials include progression of Parkinson, CVA, myelopathy  -Initial head CT without acute abnormality, MRI of the brain negative for CVA, MRI of the C-spine shows spondylosis with mild spinal stenosis at the remaining to mild cord compression most pronounced at C4-5. No significant change since 7/2019  -Repeat CT pelvis with contrast to rule out fracture, pending  -PT to evaluate once rule out for acute pathology  -Neurology following    Parkinson's disease  -Continue Sinemet  -Neurology following    Coronary artery disease  -With elevated troponin upon admission with EKG changes  -Cardiology evaluating the patient  -For stress test in a.m. Acute kidney injury  -Patient has baseline CKD  -Improving with IV fluid    Hypertension  -Blood pressure improving, continue monitor    Atrial fibrillation  -Paroxysmal atrial fibrillation  -Currently in sinus rhythm  -Holding off on anticoagulation for now  -Cardiology following    Carotic artery stenosis  -Plan for outpatient surgery    Dyslipidemia  -Statin    Anxiety and depression  -Stable    Anemia  -Anemia of chronic kidney disease  -H&H stable    Code status: FULL  DVT prophylaxis: Heparin    Care Plan discussed with: Patient/Family and Nurse  Anticipated Disposition: SNF/LTC  Anticipated Discharge: Greater than 48 hours     Hospital Problems  Date Reviewed: 5/1/2020          Codes Class Noted POA    Weakness ICD-10-CM: R53.1  ICD-9-CM: 780.79  5/4/2020 Unknown        * (Principal) Generalized weakness ICD-10-CM: R53.1  ICD-9-CM: 780.79  4/30/2020 Yes                Review of Systems:   A comprehensive review of systems was negative except for that written in the HPI. Vital Signs:    Last 24hrs VS reviewed since prior progress note.  Most recent are:  Visit Vitals  /53 (BP 1 Location: Left arm, BP Patient Position: At rest)   Pulse 61   Temp 98.6 °F (37 °C)   Resp 19   Ht 5' 5\" (1.651 m)   Wt 75.2 kg (165 lb 12.6 oz)   SpO2 97%   BMI 27.59 kg/m²         Intake/Output Summary (Last 24 hours) at 5/4/2020 [address] filed at 5/4/2020 1200  Gross per 24 hour   Intake    Output 400 ml   Net -400 ml        Physical Examination:             Constitutional:  No acute distress, cooperative, pleasant    ENT:  Oral mucosa moist, oropharynx benign. Resp:  CTA bilaterally. No wheezing/rhonchi/rales. No accessory muscle use   CV:  Regular rhythm, normal rate, no murmurs, gallops, rubs    GI:  Soft, non distended, non tender. normoactive bowel sounds, no hepatosplenomegaly     Musculoskeletal:  No edema, warm, 2+ pulses throughout    Neurologic:  Moves all extremities. AAOx3, CN II-XII reviewed     Skin:  Good turgor, no rashes or ulcers       Data Review:    Review and/or order of clinical lab test      Labs:     Recent Labs     05/02/20 0314   WBC 7.2   HGB 10.3*   HCT 31.4*        Recent Labs     05/04/20 0312 05/02/20  0314    138   K 4.2 4.1    108   CO2 26 26   BUN 16 23*   CREA 1.34* 1.35*   * 143*   CA 9.7 9.2   MG  --  2.0     No results for input(s): SGOT, GPT, ALT, AP, TBIL, TBILI, TP, ALB, GLOB, GGT, AML, LPSE in the last 72 hours. No lab exists for component: AMYP, HLPSE  No results for input(s): INR, PTP, APTT, INREXT, INREXT in the last 72 hours. No results for input(s): FE, TIBC, PSAT, FERR in the last 72 hours. No results found for: FOL, RBCF   No results for input(s): PH, PCO2, PO2 in the last 72 hours.   Recent Labs     05/02/20  0707 05/02/20  0314 05/01/20  2303   TROIQ 1.19* 1.42* 1.58*     Lab Results   Component Value Date/Time    Cholesterol, total 129 05/01/2020 06:37 AM    HDL Cholesterol 36 05/01/2020 06:37 AM    LDL, calculated 74.6 05/01/2020 06:37 AM    Triglyceride 92 05/01/2020 06:37 AM    CHOL/HDL Ratio 3.6 05/01/2020 06:37 AM     Lab Results   Component Value Date/Time    Glucose (POC) 139 (H) 07/26/2019 04:34 PM    Glucose (POC) 126 (H) 07/26/2019 11:01 AM    Glucose (POC) 94 07/12/2019 05:40 PM    Glucose (POC) 135 (H) 02/17/2015 06:46 AM    Glucose (POC) 129 (H) 02/16/2015 09:58 PM     Lab Results   Component Value Date/Time    Color DARK YELLOW 04/30/2020 07:57 PM    Appearance CLEAR 04/30/2020 07:57 PM    Specific gravity 1.025 04/30/2020 07:57 PM    pH (UA) 5.0 04/30/2020 07:57 PM    Protein 100 (A) 04/30/2020 07:57 PM    Glucose Negative 04/30/2020 07:57 PM    Ketone 15 (A) 04/30/2020 07:57 PM    Bilirubin NEGATIVE  07/11/2019 10:17 AM    Urobilinogen 1.0 04/30/2020 07:57 PM    Nitrites Negative 04/30/2020 07:57 PM    Leukocyte Esterase Negative 04/30/2020 07:57 PM    Epithelial cells FEW 04/30/2020 07:57 PM    Bacteria Negative 04/30/2020 07:57 PM    WBC 0-4 04/30/2020 07:57 PM    RBC 0-5 04/30/2020 07:57 PM         Medications Reviewed:     Current Facility-Administered Medications   Medication Dose Route Frequency    metoprolol succinate (TOPROL-XL) XL tablet 50 mg  50 mg Oral QHS    hydrALAZINE (APRESOLINE) tablet 25 mg  25 mg Oral TID    senna-docusate (PERICOLACE) 8.6-50 mg per tablet 1 Tab  1 Tab Oral DAILY    polyethylene glycol (MIRALAX) packet 17 g  17 g Oral DAILY    ARIPiprazole (ABILIFY) tablet 15 mg  15 mg Oral DAILY    aspirin delayed-release tablet 81 mg  81 mg Oral DAILY    atorvastatin (LIPITOR) tablet 40 mg  40 mg Oral QHS    carbidopa-levodopa (SINEMET)  mg per tablet 2 Tab  2 Tab Oral QID    clopidogreL (PLAVIX) tablet 75 mg  75 mg Oral DAILY AFTER BREAKFAST    cyanocobalamin (VITAMIN B12) tablet 100 mcg  100 mcg Oral DAILY    diazePAM (VALIUM) tablet 2 mg  2 mg Oral TID    DULoxetine (CYMBALTA) capsule 120 mg  120 mg Oral DAILY    pantoprazole (PROTONIX) tablet 40 mg  40 mg Oral ACB    nitroglycerin (NITROSTAT) tablet 0.4 mg  0.4 mg SubLINGual Q5MIN PRN    sodium chloride (NS) flush 5-40 mL  5-40 mL IntraVENous Q8H    sodium chloride (NS) flush 5-40 mL  5-40 mL IntraVENous PRN    ondansetron (ZOFRAN) injection 4 mg  4 mg IntraVENous Q4H PRN    bisacodyL (DULCOLAX) tablet 5 mg  5 mg Oral DAILY PRN    heparin (porcine) injection 5,000 Units  5,000 Units SubCUTAneous Q8H    acetaminophen (TYLENOL) tablet 650 mg  650 mg Oral Q4H PRN    lactated Ringers infusion  75 mL/hr IntraVENous CONTINUOUS     ______________________________________________________________________  EXPECTED LENGTH OF STAY: - - -  ACTUAL LENGTH OF STAY:          4                 Da Gee MD

## 2020-05-04 NOTE — PROGRESS NOTES
Problem: Self Care Deficits Care Plan (Adult)  Goal: *Acute Goals and Plan of Care (Insert Text)  Description:   FUNCTIONAL STATUS PRIOR TO ADMISSION: Patient was modified independent using a rolling walker for functional mobility. Patient Mod I for basic and instrumental ADLs (using [facility] transport services). HOME SUPPORT: The patient lived alone at [address] [place] [address], utilized [facility] transport services for groceries. Occupational Therapy Goals  Initiated 5/4/2020  1. Patient will perform self-feeding with independence within 7 day(s). 2.  Patient will perform grooming in unsupported sitting with supervision/set-up within 7 day(s). 3.  Patient will perform lower body dressing with moderate assistance within 7 day(s). 4.  Patient will perform toilet transfers with moderate assistance x1 within 7 day(s). 5.  Patient will perform all aspects of toileting with moderate assistance  within 7 day(s). 6.  Patient will participate in upper extremity therapeutic exercise/activities with supervision/set-up for 5 minutes within 7 day(s). 7.  Patient will utilize energy conservation techniques during functional activities with verbal cues within 7 day(s). Outcome: Not Met     OCCUPATIONAL THERAPY EVALUATION  Patient: Maddi Vásquez (39 y.o. female)  Date: 5/4/2020  Primary Diagnosis: Generalized weakness [R53.1]        Precautions: Fall, Bed Alarm       ASSESSMENT  Based on the objective data described below, the patient presents with impaired balance, activity tolerance, volition, safety awareness, ROM, strength, lower body access, and coordination, as well as hesitation with all OOB mobility s/p admission for increasing weakness. PTA, patient resides in [facility] with  use, lives alone, hx of Parkinson's dx. She now presents far from this baseline, requiring Min-Mod A x2 for brief mobility, fair-poor standing balance with low activity tolerance.  Mod-max encouragement required for OOB mobility, patient reporting some discomfort with being seated EOB d/t baseline sacral wound. Considering she was Mod I, she would benefit from SNF rehab to maximize strength & functional independence, however she reports she plans to transition to [facility] as she is unable to manage ADLs & IADLs any longer. Current Level of Function Impacting Discharge (ADLs/self-care): setup-Mod A for upper body ADLs, Mod-Total A for lower body ADLs, and Min-Mod A x1-2 for mobility with RW    Functional Outcome Measure: The patient scored Total: 25/100 on the Barthel Index outcome measure which is indicative of 75% impaired ability to care for basic self needs/dependency on others; inferred 100% dependency on others for instrumental ADLs. Other factors to consider for discharge: fall risk, PMH, PLOF, physical assist of 2, decreased volition     Patient will benefit from skilled therapy intervention to address the above noted impairments. PLAN :  Recommendations and Planned Interventions: self care training, functional mobility training, therapeutic exercise, balance training, therapeutic activities, endurance activities, patient education, home safety training, and family training/education    Frequency/Duration: Patient will be followed by occupational therapy 5 times a week to address goals. Recommendation for discharge: (in order for the patient to meet his/her long term goals)  Therapy up to 5 days/week in SNF setting vs transition to [facility]? This discharge recommendation:  Has not yet been discussed the attending provider and/or case management    IF patient discharges home will need the following DME: To be determined (TBD)         SUBJECTIVE:   Patient stated Well I don't know how much longer I'll be here, I can't care for myself anymore so I'm working on moving to assisted living.     OBJECTIVE DATA SUMMARY:   HISTORY:   Past Medical History:   Diagnosis Date    Anxiety disorder     Atrial fibrillation ([facility] [address]     CAD (coronary artery disease) 2007    stents, CABG x 3v    Carotid stenosis     Cervical stenosis of spinal canal 07/2019    Chronic kidney disease     Cough     CVA (cerebral vascular accident) ([facility] [address] 07/2019    left lacunar infarct at head of caudate    Depression     AND CHRONIC ANXIETY    Diabetes (HCC)     GERD (gastroesophageal reflux disease)     High cholesterol     History of peptic ulcer     Bleeding ulcer with increased NSAID use    Hypertension     Left carotid stenosis 07/2019    s/p left CEA with Dr. Belkys Butler    MI, old 2007    PUD (peptic ulcer disease)     Stroke ([facility] [address]     Tremor     Valvular heart disease      Past Surgical History:   Procedure Laterality Date    CARDIAC SURG PROCEDURE UNLIST      CABG X3 VESSEL    HX CAROTID ENDARTERECTOMY  07/2019    HX CORONARY ARTERY BYPASS GRAFT      HX TONSILLECTOMY  1963    TOTAL KNEE ARTHROPLASTY Right 2015       Expanded or extensive additional review of patient history:     Home Situation  Home Environment: Independent living([facility])  # Steps to Enter: 0  One/Two Story Residence: One story  Living Alone: Yes  Support Systems: None  Patient Expects to be Discharged to[de-identified] Assisted living  Current DME Used/Available at Home: [address], rolling, Shower chair  Tub or Shower Type: Shower    Hand dominance: Right    EXAMINATION OF PERFORMANCE DEFICITS:  Cognitive/Behavioral Status:  Neurologic State: Alert     Cognition: Appropriate for age attention/concentration; Follows commands;Poor safety awareness  Perception: Appears intact  Perseveration: No perseveration noted  Safety/Judgement: Awareness of environment;Decreased awareness of need for assistance;Decreased awareness of need for safety;Decreased insight into deficits    Skin: Appears intact    Edema: None    Hearing:   Auditory  Auditory Impairment: None    Vision/Perceptual:    Tracking: Able to track stimulus in all quadrants w/o difficulty                      Acuity: Within Defined Limits    Corrective Lenses: Glasses    Range of Motion:  AROM: Generally decreased, functional  PROM: Generally decreased, functional                      Strength:  Strength: Generally decreased, functional                Coordination:  Coordination: Generally decreased, functional  Fine Motor Skills-Upper: Left Intact; Right Intact(generally decreased)    Gross Motor Skills-Upper: Left Intact; Right Intact(generally decreased)      Balance:  Sitting: Impaired  Sitting - Static: Good (unsupported)  Sitting - Dynamic: Fair (occasional)  Standing: Impaired; With support(RW)  Standing - Static: Fair;Constant support  Standing - Dynamic : Poor;Constant support    Functional Mobility and Transfers for ADLs:  Bed Mobility:  Supine to Sit: Moderate assistance; Additional time  Sit to Supine: Moderate assistance;Assist x2  Scooting: Moderate assistance;Assist x1;Additional time(adaptive positioning d/r baseline sacral wound)    Transfers:  Sit to Stand: Minimum assistance; Moderate assistance;Assist x2;Adaptive equipment(RW)  Stand to Sit: Minimum assistance;Assist x2;Adaptive equipment(RW)  Toilet Transfer : Minimum assistance; Moderate assistance;Assist x2;Adaptive equipment(infer per mobility to stretcher)  Assistive Device : Gait Belt;Walker, rolling    ADL Assessment:  Feeding: Moderate assistance    Oral Facial Hygiene/Grooming: Moderate assistance    Bathing: Maximum assistance    Upper Body Dressing: Moderate assistance    Lower Body Dressing: Total assistance    Toileting: Total assistance                ADL Intervention and task modifications:   Lower Body Dressing Assistance  Dressing Assistance: Total assistance(dependent)  Socks: Total assistance (dependent)  Leg Crossed Method Used: No  Position Performed: Seated edge of bed  Cues: Physical assistance    Toileting  Toileting Assistance:  Total assistance(dependent)(brief & purewick)    Cognitive Retraining  Safety/Judgement: Awareness of environment;Decreased awareness of need for assistance;Decreased awareness of need for safety;Decreased insight into deficits    Therapeutic Exercise:  Sit<>stand x2 with Min-Mod A x2 and RW support, mod cues for technique. Brief walk/pviot to the stretcher with Min A x2 and max cues for sequencing, assist for RW facilitation     Functional Measure:  Barthel Index:    Bathin  Bladder: 5  Bowels: 5  Groomin  Dressin  Feedin  Mobility: 0  Stairs: 0  Toilet Use: 0  Transfer (Bed to Chair and Back): 5  Total: 25/100        The Barthel ADL Index: Guidelines  1. The index should be used as a record of what a patient does, not as a record of what a patient could do. 2. The main aim is to establish degree of independence from any help, physical or verbal, however minor and for whatever reason. 3. The need for supervision renders the patient not independent. 4. A patient's performance should be established using the best available evidence. Asking the patient, friends/relatives and nurses are the usual sources, but direct observation and common sense are also important. However direct testing is not needed. 5. Usually the patient's performance over the preceding 24-48 hours is important, but occasionally longer periods will be relevant. 6. Middle categories imply that the patient supplies over 50 per cent of the effort. 7. Use of aids to be independent is allowed. Romel Montero., Barthel, D.W. ([id number]). Functional evaluation: the Barthel Index. [address] [facility] (14)2. FANTA Johns, Bettye Alicia, Jayshree Caballero., [place], [address] [place] [address] (). Measuring the change indisability after inpatient rehabilitation; comparison of the responsiveness of the Barthel Index and Functional [employer] Measure. Journal of Neurology, Neurosurgery, and Psychiatry, 66(4), 399-036. Olvin Gee, N.[email or web address].A, INDIRA Ventura.ANGELICA, & Ada Novak, M.A. (2004.) Assessment of post-stroke quality of life in cost-effectiveness studies:  The usefulness of the Barthel Index and the EuroQoL-5D. Quality of Life Research, 15, 894-36         Occupational Therapy Evaluation Charge Determination   History Examination Decision-Making   LOW Complexity : Brief history review  MEDIUM Complexity : 3-5 performance deficits relating to physical, cognitive , or psychosocial skils that result in activity limitations and / or participation restrictions LOW Complexity : No comorbidities that affect functional and no verbal or physical assistance needed to complete eval tasks       Based on the above components, the patient evaluation is determined to be of the following complexity level: LOW   Pain Rating:  None reported     Activity Tolerance:   Fair  Please refer to the flowsheet for vital signs taken during this treatment. After treatment patient left in no apparent distress: On stretcher     COMMUNICATION/EDUCATION:   The patients plan of care was discussed with: Physical therapist and Registered nurse. Home safety education was provided and the patient/caregiver indicated understanding., Patient/family have participated as able in goal setting and plan of care. , and Patient/family agree to work toward stated goals and plan of care. This patients plan of care is appropriate for delegation to [facility].     Thank you for this referral.  GUTIERREZ Nguyen, OTR/L  Time Calculation: 20 mins

## 2020-05-04 NOTE — PROGRESS NOTES
Bedside and Verbal shift change report given to [facility] (oncoming nurse) by Pelon Jones RN (offgoing nurse). Report included the following information SBAR, Kardex, ED Summary, Intake/Output, MAR, Cardiac Rhythm NSR and Dual Neuro Assessment.

## 2020-05-04 NOTE — PROGRESS NOTES
Problem: Falls - Risk of  Goal: *Absence of Falls  Description: Document Aubrey Newman Fall Risk and appropriate interventions in the flowsheet.   Outcome: Progressing Towards Goal  Note: Fall Risk Interventions:  Mobility Interventions: Communicate number of staff needed for ambulation/transfer, PT Consult for mobility concerns, PT Consult for assist device competence, OT consult for ADLs, Patient to call before getting OOB         Medication Interventions: Evaluate medications/consider consulting pharmacy, Patient to call before getting OOB, Teach patient to arise slowly    Elimination Interventions: Call light in reach, Toileting schedule/hourly rounds, Patient to call for help with toileting needs    History of Falls Interventions: Evaluate medications/consider consulting pharmacy, Door open when patient unattended, Room close to nurse's station         Problem: Patient Education: Go to Patient Education Activity  Goal: Patient/Family Education  Outcome: Progressing Towards Goal     Problem: Pain  Goal: *Control of Pain  Outcome: Progressing Towards Goal  Goal: *PALLIATIVE CARE:  Alleviation of Pain  Outcome: Progressing Towards Goal     Problem: Patient Education: Go to Patient Education Activity  Goal: Patient/Family Education  Outcome: Progressing Towards Goal     Problem: Patient Education: Go to Patient Education Activity  Goal: Patient/Family Education  Outcome: Progressing Towards Goal     Problem: Patient Education: Go to Patient Education Activity  Goal: Patient/Family Education  Outcome: Progressing Towards Goal

## 2020-05-04 NOTE — PROGRESS NOTES
Problem: Falls - Risk of  Goal: *Absence of Falls  Description: Document Lemonric De Leonas Fall Risk and appropriate interventions in the flowsheet. Outcome: Progressing Towards Goal  Note: Fall Risk Interventions:  Mobility Interventions: Bed/chair exit alarm, Communicate number of staff needed for ambulation/transfer, OT consult for ADLs, Patient to call before getting OOB, PT Consult for mobility concerns, PT Consult for assist device competence, Utilize walker, cane, or other assistive device         Medication Interventions: Bed/chair exit alarm, Evaluate medications/consider consulting pharmacy, Patient to call before getting OOB, Teach patient to arise slowly    Elimination Interventions: Call light in reach, Bed/chair exit alarm, Elevated toilet seat, Patient to call for help with toileting needs, Toileting schedule/hourly rounds, Stay With Me (per policy)    History of Falls Interventions: Consult care management for discharge planning, Bed/chair exit alarm, Door open when patient unattended, Evaluate medications/consider consulting pharmacy         Problem: Pressure Injury - Risk of  Goal: *Prevention of pressure injury  Description: Document Gregorio Scale and appropriate interventions in the flowsheet. Outcome: Progressing Towards Goal  Note: Pressure Injury Interventions:  Sensory Interventions: Chair cushion, Check visual cues for pain, Discuss PT/OT consult with provider, Float heels, Keep linens dry and wrinkle-free, Maintain/enhance activity level, Minimize linen layers, Monitor skin under medical devices, Pad between skin to skin, Turn and reposition approx.  every two hours (pillows and wedges if needed)    Moisture Interventions: Apply protective barrier, creams and emollients, Absorbent underpads, Assess need for specialty bed, Check for incontinence Q2 hours and as needed, Internal/External urinary devices, Limit adult briefs, Minimize layers    Activity Interventions: Chair cushion, Increase time out of bed, Pressure redistribution bed/mattress(bed type), PT/OT evaluation    Mobility Interventions: Chair cushion, Float heels, HOB 30 degrees or less, Pressure redistribution bed/mattress (bed type), PT/OT evaluation, Turn and reposition approx.  every two hours(pillow and wedges)    Nutrition Interventions: Document food/fluid/supplement intake    Friction and Shear Interventions: Apply protective barrier, creams and emollients, HOB 30 degrees or less, Lift sheet, Lift team/patient mobility team, Transferring/repositioning devices                Problem: General Medical Care Plan  Goal: *Vital signs within specified parameters  Outcome: Progressing Towards Goal  Goal: *Labs within defined limits  Outcome: Progressing Towards Goal  Goal: *Absence of infection signs and symptoms  Outcome: Progressing Towards Goal  Goal: *Optimal pain control at patient's stated goal  Outcome: Progressing Towards Goal  Goal: *Skin integrity maintained  Outcome: Progressing Towards Goal  Goal: *Fluid volume balance  Outcome: Progressing Towards Goal     Problem: Pain  Goal: *Control of Pain  Outcome: Progressing Towards Goal

## 2020-05-04 NOTE — PROGRESS NOTES
Problem: Mobility Impaired (Adult and Pediatric)  Goal: *Acute Goals and Plan of Care (Insert Text)  Description: FUNCTIONAL STATUS PRIOR TO ADMISSION: Patient was modified independent using a rollator for functional mobility. Pt reports increasing difficulty with ambulation secondary to pain and fatigue - maximal effort to ambulate. HOME SUPPORT PRIOR TO ADMISSION: The patient lived in [place] [address] - plans to discharge to assisted living. Physical Therapy Goals  Initiated 5/4/2020  1. Patient will move from supine to sit and sit to supine , scoot up and down and roll side to side in bed with minimal assistance/contact guard assist within 7 day(s). 2.  Patient will transfer from bed to chair and chair to bed with minimal assistance/contact guard assist using the least restrictive device within 7 day(s). 3.  Patient will perform sit to stand with supervision/set-up within 7 day(s). 4.  Patient will ambulate with minimal assistance/contact guard assist for 20 feet with the least restrictive device within 7 day(s). PHYSICAL THERAPY EVALUATION  Patient: Alma Pump (13 y.o. female)  Date: 5/4/2020  Primary Diagnosis: Generalized weakness [R53.1]  Weakness [R53.1]        Precautions: fall       ASSESSMENT  Based on the objective data described below, the patient presents with decreased insight, generalized weakness, decline in functional mobility, decreased activity tolerance, and impaired balance. Pt reporting increasing difficulty with walking over past 6 months  - very painful and too taxing. Pt appears reluctant to work on standing or mobilizing and states she plans to go to assisted living where people will help her get in/out of bed - but does not feel walking is a realistic goal at this time.       Current Level of Function Impacting Discharge (mobility/balance):  Min assist sit to stand; Standing with RW x 3 mins with contact guard; took 4 side steps with RW to [address] Outcome Measure: The patient scored 25/100 on the Barthel Index outcome measure which is indicative of 75% disability for ADL's. Other factors to consider for discharge: Significant decline from PLOF, Parkinson's     Patient will benefit from skilled therapy intervention to address the above noted impairments. PLAN :  Recommendations and Planned Interventions: bed mobility training, transfer training, gait training, therapeutic exercises, patient and family training/education, and therapeutic activities      Frequency/Duration: Patient will be followed by physical therapy:  5 times a week to address goals. Recommendation for discharge: (in order for the patient to meet his/her long term goals)  Therapy up to 5 days/week in SNF setting vs. Assisted Living with Home Care    This discharge recommendation:  Has not yet been discussed the attending provider and/or case management    IF patient discharges home will need the following DME: patient owns DME required for discharge         SUBJECTIVE:   Patient stated I don't want to go to rehab. It doesn't do anything for me.     OBJECTIVE DATA SUMMARY:   HISTORY:    Past Medical History:   Diagnosis Date    Anxiety disorder     Atrial fibrillation (HCC)     CAD (coronary artery disease) 2007    stents, CABG x 3v    Carotid stenosis     Cervical stenosis of spinal canal 07/2019    Chronic kidney disease     Cough     CVA (cerebral vascular accident) ([address] 07/2019    left lacunar infarct at head of caudate    Depression     AND CHRONIC ANXIETY    Diabetes ([address]     GERD (gastroesophageal reflux disease)     High cholesterol     History of peptic ulcer     Bleeding ulcer with increased NSAID use    Hypertension     Left carotid stenosis 07/2019    s/p left CEA with Dr. Smitha Calderon    MI, old 2007    PUD (peptic ulcer disease)     Stroke ([address]     Tremor     Valvular heart disease      Past Surgical History:   Procedure Laterality Date    CARDIAC SURG PROCEDURE UNLIST      CABG X3 VESSEL    HX CAROTID ENDARTERECTOMY  07/2019    HX CORONARY ARTERY BYPASS GRAFT      HX TONSILLECTOMY  1963    TOTAL KNEE ARTHROPLASTY Right 2015       Personal factors and/or comorbidities impacting plan of care: Parkinson's    Home Situation  Home Environment: Independent living([facility])  # Steps to Enter: 0  One/Two Story Residence: One story  Living Alone: Yes  Support Systems: None  Patient Expects to be Discharged to[de-identified] Assisted living  Current DME Used/Available at Home: Roxi Bile, rolling, Shower chair  Tub or Shower Type: Shower    EXAMINATION/PRESENTATION/DECISION MAKING:   Critical Behavior:  Neurologic State: Alert  Orientation Level: Oriented X4  Cognition: Appropriate for age attention/concentration, Follows commands, Poor safety awareness  Safety/Judgement: Awareness of environment, Decreased awareness of need for assistance, Decreased awareness of need for safety, Decreased insight into deficits  Hearing: Auditory  Auditory Impairment: None    Range Of Motion:  AROM: Generally decreased, functional           PROM: Generally decreased, functional           Strength:    Strength: Generally decreased, functional                    Tone & Sensation:                                  Coordination:  Coordination: Generally decreased, functional  Vision:   Tracking: Able to track stimulus in all quadrants w/o difficulty  Acuity: Within Defined Limits  Corrective Lenses: Glasses  Functional Mobility:  Bed Mobility:     Supine to Sit: Moderate assistance;Assist x1  Sit to Supine: Moderate assistance;Assist x1  Scooting: Total assistance  Transfers:  Sit to Stand: Minimum assistance  Stand to Sit: Minimum assistance                       Balance:   Sitting: Impaired  Sitting - Static: Good (unsupported)  Sitting - Dynamic: Fair (occasional)  Standing: Impaired; With support(RW)  Standing - Static: Fair;Constant support  Standing - Dynamic : Fair;Constant support  Ambulation/Gait Training:              Gait Description (WDL): (Pt declined to attempt - took 4 side steps to [address] with RW)                             Functional Measure:  Barthel Index:    Bathin  Bladder: 5  Bowels: 5  Groomin  Dressin  Feedin  Mobility: 0  Stairs: 0  Toilet Use: 0  Transfer (Bed to Chair and Back): 5  Total: 25/100       The Barthel ADL Index: Guidelines  1. The index should be used as a record of what a patient does, not as a record of what a patient could do. 2. The main aim is to establish degree of independence from any help, physical or verbal, however minor and for whatever reason. 3. The need for supervision renders the patient not independent. 4. A patient's performance should be established using the best available evidence. Asking the patient, friends/relatives and nurses are the usual sources, but direct observation and common sense are also important. However direct testing is not needed. 5. Usually the patient's performance over the preceding 24-48 hours is important, but occasionally longer periods will be relevant. 6. Middle categories imply that the patient supplies over 50 per cent of the effort. 7. Use of aids to be independent is allowed. Sherif Guzman., Barthel, DRennyW. (3032). Functional evaluation: the Barthel Index. [address] [facility] (14)2. Scotty Kincaid, LOGANJRennyMJELLY, Torres Sim, Bianca Henderson., [place], [address] [place] [address] (). Measuring the change indisability after inpatient rehabilitation; comparison of the responsiveness of the Barthel Index and Functional [place] Measure. Journal of Neurology, Neurosurgery, and Psychiatry, 66(4), 459-683. Vy Andino, N.TIAGO.A, APARNA Ventura, & See Pulido, MRennyA. (2004.) Assessment of post-stroke quality of life in cost-effectiveness studies: The usefulness of the Barthel Index and the EuroQoL-5D.  Quality of Life Research, 15, 023-73           Physical Therapy Evaluation Charge Determination   History Examination Presentation Decision-Making   LOW Complexity : Zero comorbidities / personal factors that will impact the outcome / POC LOW Complexity : 1-2 Standardized tests and measures addressing body structure, function, activity limitation and / or participation in recreation  LOW Complexity : Stable, uncomplicated  LOW Complexity : FOTO score of       Based on the above components, the patient evaluation is determined to be of the following complexity level: LOW     Pain Rating:  Pt stated she has pain all over but especially left buttocks and down left leg. Activity Tolerance:   Fair - pt declined attempting short distance ambulation. Please refer to the flowsheet for vital signs taken during this treatment. After treatment patient left in no apparent distress:   Supine in bed and Call bell within reach    COMMUNICATION/EDUCATION:   The patients plan of care was discussed with: Registered nurse. Fall prevention education was provided and the patient/caregiver indicated understanding., Patient/family have participated as able in goal setting and plan of care. , and Patient/family agree to work toward stated goals and plan of care.     Thank you for this referral.  Rajesh Mejia, PT   Time Calculation: 25 mins

## 2020-05-04 NOTE — PROGRESS NOTES
[address] [place] [address]               Division of Cardiology  [phone]                       Progress note              Yanique Real M.D., F.A.CRennyC. NAME:  Joseph Turner   :   1940   MRN:   [id number]         ICD-10-CM ICD-9-CM    1. Weakness R53.1 780.79    2. MORENO (acute kidney injury) ([facility] N17.9 584.9                  HPI once again no chest pain or shortness of breath reported. She remains fairly weak she tells me. She has a history of falls in the past for which reason she was not placed on oral anticoagulation as per Dr. Norberto Church. Assessment/Plan:    1. CAD: She remains completely and seemingly asymptomatic from the cardiac standpoint despite elevation in troponin. I have discussed with her again today potential for cardiac catheterization but she wants to hold off and she is not sure about it. She did have in addition to elevated troponin also is T wave inversion in the lateral leads. Neurological evaluation noted. No acute issues at this time. I feel that if she declines cardiac catheterization the very least we need to proceed with a nuclear stress test to ascertain the lack of severe ischemic burden. She will continue aspirin Plavix and statin. Coreg may be changed to Toprol on account of her fatigue. 2.  Paroxysmal atrial fibrillation: She remains in normal sinus rhythm. Occasional sinus bradycardia also noted. She was deemed not a candidate for oral anticoagulation in the past by Dr. Norberto Church. Continue beta-blocker for now. Echocardiogram remains pending. 3.  Hypertension: Patient on hydralazine and Coreg. Blood pressure seems to be controlled. Nonetheless I suspect beta-blocker causing part of her fatigue and weakness. I will stop Coreg and start Toprol-XL 50 mg nightly.   We will need to follow carefully for potential recurrent atrial fibrillation or and increase blood pressure. Hydralazine will be increased. 4.  Weakness: I suspect multifactorial.  Change Coreg to Toprol once a day at night hopefully that will improve some of the fatigue during the day. Previous history of CVA also contributing. Neurologic evaluation with brain MRI and MRI of the cervical spine noted. No significant changes at this time. 5.  Carotid artery stenosis: 80% stenosis of the right carotid artery. Surgery was placed on hold at this time. CARDIAC STUDIES      07/11/19   ECHO ADULT COMPLETE 07/11/2019 7/11/2019    Narrative · Left Ventricle: Normal cavity size and systolic function (ejection   fraction normal). Mild concentric hypertrophy. Estimated left ventricular   ejection fraction is 61 - 65%. No regional wall motion abnormality noted. Mild (grade 1) left ventricular diastolic dysfunction. · Aortic Valve: Aortic valve sclerosis with stenosis. Aortic valve mean   gradient is 9.2 mmHg. Aortic valve area is 1.8 cm2. Mild aortic valve   stenosis is present. Mild aortic valve regurgitation is present. · Mitral Valve: Moderate mitral annular calcification. Mild mitral valve   regurgitation. · Left Atrium: Moderately dilated left atrium. · Pulmonary Artery: There is no evidence of pulmonary hypertension. Signed by: Michael Garrett MD       02/09/15   NM CARDIAC SPECT W STRS/REST MULT 02/10/2015 2/10/2015    Narrative **Final Report**       ICD Codes / Adm. Diagnosis: E888.9  427.89 / Unspecified fall  Other   specified cardiac dysrhy  Examination:  NM CARD SPECT MULT WALL EF  - [id number] - Feb 10 2015 11:05AM  Accession No:  [id number]  Reason:  cad      REPORT:  EXAM:  NM CARD SPECT MULT WALL EF    INDICATION: cad    COMPARISON:        TRACER: Tc 99m sestamibi    TECHNIQUE:  Resting SPECT images of the heart were obtained following the   uneventful intravenous administration of 11 mCi of Tc 99m sestamibi.     Gated stress SPECT images of the heart were obtained following LexiScan   protocol and the uneventful intravenous administration of 32 mCi of Tc 99m   sestamibi. FINDINGS: At stress, there is diminished activity in the apex and inferior   wall which is mildly improved rest..     Left ventricular wall motion is normal except for slight diminished wall   thickening inferior left ventricle. Left ventricular ejection fraction is 70   %. IMPRESSION:   At stress, there is diminished activity in the apex and inferior wall which   is mildly improved at rest suspicious for ischemia. Left ventricular   ejection fraction is 70%. The floor was notified of the results. Vidhya Corral              Signing/Reading Doctor: Austin Paulson ([id number])    Jerome Gallagher ([id number])  Feb 10 2015  3:28PM                                  Signed by: David Le MD             EKG Results     Procedure 720 Value Units Date/Time    EKG, 12 LEAD, INITIAL [[id number]] Collected:  05/01/20 0442    Order Status:  Completed Updated:  05/01/20 1353     Ventricular Rate 108 BPM      Atrial Rate 119 BPM      QRS Duration 106 ms      Q-T Interval 356 ms      QTC Calculation (Bezet) 477 ms      Calculated R Axis -46 degrees      Calculated T Axis 144 degrees      Diagnosis --     Atrial fibrillation with rapid ventricular response  Left axis deviation  Incomplete left bundle branch block  Moderate voltage criteria for LVH, may be normal variant  ST & T wave abnormality, consider lateral ischemia or digitalis effect  When compared with ECG of 30-APR-2020 19:29,  Atrial fibrillation has replaced Sinus rhythm  Vent.  rate has increased BY  50 BPM  Incomplete left bundle branch block is now present  ST now depressed in Lateral leads  Confirmed by Zuri Salcedo M.D., Joe Dial ([id number]) on 5/1/2020 1:53:42 PM      EKG 12 LEAD INITIAL [[id number]] Collected:  04/30/20 1929    Order Status:  Completed Updated:  05/01/20 1348     Ventricular Rate 58 BPM      Atrial Rate 58 BPM      P-R Interval 158 ms      QRS Duration 100 ms      Q-T Interval 466 ms      QTC Calculation (Bezet) 457 ms      Calculated P Axis 9 degrees      Calculated R Axis -49 degrees      Calculated T Axis 117 degrees      Diagnosis --     Sinus bradycardia  Left anterior fascicular block  Voltage criteria for left ventricular hypertrophy  T wave abnormality, consider lateral ischemia  When compared with ECG of 18-MAR-2020 08:31,  T wave inversion more evident in Lateral leads  Confirmed by Keron Dodson M.D., Carlos Manuel Jones ([id number]) on 5/1/2020 1:48:33 PM              IMAGING      CT Results (most recent):  Results from [place] encounter on 04/30/20   CT PELV WO CONT    Narrative CT PELVIS  WITHOUT CONTRAST. 5/2/2020 12:21 PM     INDICATION: Fall. COMPARISON: 6/3/2014. TECHNIQUE: CT of the pelvis was performed without contrast. CT dose reduction  was achieved through use of a standardized protocol tailored for this  examination and automatic exposure control for dose modulation. Adaptive  statistical iterative reconstruction (ASIR) was utilized. FINDINGS:  No displaced fracture. The patient is osteopenic. A 42 mm AAA is partially imaged. Presacral edema is associated with a large  stool ball impacted in the rectum. This has mass effect on the uterus and  bladder. Impression IMPRESSION:  1. No displaced fracture. 2. Rectal fecal impaction. 3. AAA. XR Results (most recent):  Results from Hospital Encounter encounter on 04/30/20   XR CHEST PORT    Narrative EXAM: XR CHEST PORT    INDICATION: weakness    COMPARISON: 3/18/2020    FINDINGS: A portable AP radiograph of the chest was obtained at 1923 hours. There is no pneumothorax or pleural effusion. The lungs are clear. Cardiac,  mediastinal and hilar contours are stable with unchanged mild-moderate  atherosclerotic thoracic aortic tortuosity. Superior median sternotomy wires  are again shown. The bones are severely osteopenic. Impression IMPRESSION: No acute findings. MRI Results (most recent):          Past Medical History:   Diagnosis Date    Anxiety disorder     [facility] ([facility]     CAD (coronary artery disease) 2007    stents, CABG x 3v    Carotid stenosis     Cervical stenosis of spinal canal 07/2019    Chronic kidney disease     Cough     CVA (cerebral vascular accident) ([facility] 07/2019    left lacunar infarct at head of caudate    Depression     AND CHRONIC ANXIETY    Diabetes (HCC)     GERD (gastroesophageal reflux disease)     High cholesterol     History of peptic ulcer     Bleeding ulcer with increased NSAID use    Hypertension     Left carotid stenosis 07/2019    s/p left CEA with Dr. Denise Grewal MI, old 2007    PUD (peptic ulcer disease)     Stroke ([facility]     Tremor     Valvular heart disease            Past Surgical History:   Procedure Laterality Date    CARDIAC SURG PROCEDURE UNLIST      CABG X3 VESSEL    HX CAROTID ENDARTERECTOMY  07/2019    HX CORONARY ARTERY BYPASS GRAFT      HX TONSILLECTOMY  1963    TOTAL KNEE ARTHROPLASTY Right 2015               Visit Vitals  /89   Pulse (!) 59   Temp 98.2 °F (36.8 °C)   Resp 20   Ht 5' 5\" (1.651 m)   Wt 165 lb 12.6 oz (75.2 kg)   SpO2 96%   BMI 27.59 kg/m²         Wt Readings from Last 3 Encounters:   05/04/20 165 lb 12.6 oz (75.2 kg)   05/01/20 162 lb (73.5 kg)   03/18/20 160 lb (72.6 kg)         Review of Systems:   Pertinent items are noted in the History of Present Illness. No intake/output data recorded. 05/02 1901 - 05/04 0700  In: 920 [P.O.:240; I.V.:680]  Out: 1950 [Urine:1950]      Telemetry: normal sinus rhythm    Physical Exam:    Neck: no JVD  Heart: regular rate and rhythm  Lungs: diminished breath sounds R anterior, L anterior  Abdomen: soft, non-tender.  Bowel sounds normal. No masses,  no organomegaly  Extremities: no edema    Current Facility-Administered Medications   Medication Dose Route Frequency    senna-docusate (PERICOLACE) 8.6-50 mg per tablet 1 Tab  1 Tab Oral DAILY    polyethylene glycol (MIRALAX) packet 17 g  17 g Oral DAILY    ARIPiprazole (ABILIFY) tablet 15 mg  15 mg Oral DAILY    aspirin delayed-release tablet 81 mg  81 mg Oral DAILY    atorvastatin (LIPITOR) tablet 40 mg  40 mg Oral QHS    carbidopa-levodopa (SINEMET)  mg per tablet 2 Tab  2 Tab Oral QID    carvediloL (COREG) tablet 25 mg  25 mg Oral BID WITH MEALS    clopidogreL (PLAVIX) tablet 75 mg  75 mg Oral DAILY AFTER BREAKFAST    cyanocobalamin (VITAMIN B12) tablet 100 mcg  100 mcg Oral DAILY    diazePAM (VALIUM) tablet 2 mg  2 mg Oral TID    DULoxetine (CYMBALTA) capsule 120 mg  120 mg Oral DAILY    hydrALAZINE (APRESOLINE) tablet 25 mg  25 mg Oral BID    pantoprazole (PROTONIX) tablet 40 mg  40 mg Oral ACB    nitroglycerin (NITROSTAT) tablet 0.4 mg  0.4 mg SubLINGual Q5MIN PRN    sodium chloride (NS) flush 5-40 mL  5-40 mL IntraVENous Q8H    sodium chloride (NS) flush 5-40 mL  5-40 mL IntraVENous PRN    ondansetron (ZOFRAN) injection 4 mg  4 mg IntraVENous Q4H PRN    bisacodyL (DULCOLAX) tablet 5 mg  5 mg Oral DAILY PRN    heparin (porcine) injection 5,000 Units  5,000 Units SubCUTAneous Q8H    acetaminophen (TYLENOL) tablet 650 mg  650 mg Oral Q4H PRN    lactated Ringers infusion  75 mL/hr IntraVENous CONTINUOUS         All Cardiac Markers in the last 24 hours:  No results found for: CPK, CK, CKMMB, CKMB, RCK3, CKMBT, CKMBPOC, CKNDX, CKND1, MONSTER, TROPT, TROIQ, ANABELLE, TROPT, TNIPOC, BNP, BNPP, BNPNT     Lab Results   Component Value Date/Time    Creatinine (POC) 1.6 (H) 02/24/2020 10:32 AM    Creatinine 1.34 (H) 05/04/2020 03:12 AM          Lab Results   Component Value Date/Time    CK 75 07/11/2019 09:35 AM    CK - MB 2.7 07/11/2019 09:35 AM    CK-MB Index 3.6 (H) 07/11/2019 09:35 AM    Troponin-I, Qt. 1.19 (H) 05/02/2020 07:07 AM     (H) 06/08/2014 04:12 AM         Lab Results   Component Value Date/Time    WBC 7.2 05/02/2020 03:14 AM    HGB 10.3 (L) 05/02/2020 03:14 AM    HCT 31.4 (L) 05/02/2020 03:14 AM    PLATELET 751 62/95/6633 03:14 AM    MCV 91.8 05/02/2020 03:14 AM         Lab Results   Component Value Date/Time    Sodium 139 05/04/2020 03:12 AM    Potassium 4.2 05/04/2020 03:12 AM    Chloride 107 05/04/2020 03:12 AM    CO2 26 05/04/2020 03:12 AM    Anion gap 6 05/04/2020 03:12 AM    Glucose 143 (H) 05/04/2020 03:12 AM    BUN 16 05/04/2020 03:12 AM    Creatinine 1.34 (H) 05/04/2020 03:12 AM    BUN/Creatinine ratio 12 05/04/2020 03:12 AM    GFR est AA 46 (L) 05/04/2020 03:12 AM    GFR est non-AA 38 (L) 05/04/2020 03:12 AM    Calcium 9.7 05/04/2020 03:12 AM         Lab Results   Component Value Date/Time     (H) 06/08/2014 04:12 AM     (H) 06/04/2014 04:44 AM    NT pro-BNP 1,941 (H) 07/11/2019 09:35 AM         Lab Results   Component Value Date/Time    Cholesterol, total 129 05/01/2020 06:37 AM    HDL Cholesterol 36 05/01/2020 06:37 AM    LDL, calculated 74.6 05/01/2020 06:37 AM    VLDL, calculated 18.4 05/01/2020 06:37 AM    Triglyceride 92 05/01/2020 06:37 AM    CHOL/HDL Ratio 3.6 05/01/2020 06:37 AM         Lab Results   Component Value Date/Time    ALT (SGPT) 15 05/01/2020 06:37 AM    AST (SGOT) 28 05/01/2020 06:37 AM    Alk.  phosphatase 88 05/01/2020 06:37 AM    Bilirubin, total 0.5 05/01/2020 06:37 AM

## 2020-05-05 ENCOUNTER — APPOINTMENT (OUTPATIENT)
Dept: NON INVASIVE DIAGNOSTICS | Age: 80
DRG: 057 | End: 2020-05-05
Attending: SPECIALIST
Payer: MEDICARE

## 2020-05-05 ENCOUNTER — APPOINTMENT (OUTPATIENT)
Dept: NUCLEAR MEDICINE | Age: 80
DRG: 057 | End: 2020-05-05
Attending: SPECIALIST
Payer: MEDICARE

## 2020-05-05 LAB
STRESS BASELINE HR: 57 BPM
STRESS ESTIMATED WORKLOAD: 1 METS
STRESS EXERCISE DUR MIN: NORMAL
STRESS PEAK DIAS BP: 73 MMHG
STRESS PEAK SYS BP: 188 MMHG
STRESS PERCENT HR ACHIEVED: 56 %
STRESS POST PEAK HR: 80 BPM
STRESS RATE PRESSURE PRODUCT: NORMAL BPM*MMHG
STRESS ST DEPRESSION: 0 MM
STRESS ST ELEVATION: 0 MM
STRESS TARGET HR: 142 BPM

## 2020-05-05 PROCEDURE — 74011250636 HC RX REV CODE- 250/636: Performed by: SPECIALIST

## 2020-05-05 PROCEDURE — 74011250637 HC RX REV CODE- 250/637: Performed by: HOSPITALIST

## 2020-05-05 PROCEDURE — 74011250636 HC RX REV CODE- 250/636: Performed by: INTERNAL MEDICINE

## 2020-05-05 PROCEDURE — 65270000029 HC RM PRIVATE

## 2020-05-05 PROCEDURE — A9500 TC99M SESTAMIBI: HCPCS

## 2020-05-05 PROCEDURE — 74011250637 HC RX REV CODE- 250/637: Performed by: INTERNAL MEDICINE

## 2020-05-05 PROCEDURE — 74011250637 HC RX REV CODE- 250/637: Performed by: SPECIALIST

## 2020-05-05 PROCEDURE — 77030038269 HC DRN EXT URIN PURWCK BARD -A

## 2020-05-05 PROCEDURE — 78452 HT MUSCLE IMAGE SPECT MULT: CPT

## 2020-05-05 PROCEDURE — 74011250636 HC RX REV CODE- 250/636: Performed by: FAMILY MEDICINE

## 2020-05-05 RX ORDER — HYDRALAZINE HYDROCHLORIDE 50 MG/1
50 TABLET, FILM COATED ORAL 3 TIMES DAILY
Status: DISCONTINUED | OUTPATIENT
Start: 2020-05-05 | End: 2020-05-09

## 2020-05-05 RX ORDER — SODIUM CHLORIDE 0.9 % (FLUSH) 0.9 %
20 SYRINGE (ML) INJECTION
Status: COMPLETED | OUTPATIENT
Start: 2020-05-05 | End: 2020-05-05

## 2020-05-05 RX ADMIN — Medication 10 ML: at 15:00

## 2020-05-05 RX ADMIN — Medication 20 ML: at 10:00

## 2020-05-05 RX ADMIN — POLYETHYLENE GLYCOL 3350 17 G: 17 POWDER, FOR SOLUTION ORAL at 09:05

## 2020-05-05 RX ADMIN — HEPARIN SODIUM 5000 UNITS: 5000 INJECTION INTRAVENOUS; SUBCUTANEOUS at 03:23

## 2020-05-05 RX ADMIN — DIAZEPAM 2 MG: 2 TABLET ORAL at 17:29

## 2020-05-05 RX ADMIN — DIAZEPAM 2 MG: 2 TABLET ORAL at 23:26

## 2020-05-05 RX ADMIN — HYDRALAZINE HYDROCHLORIDE 25 MG: 25 TABLET, FILM COATED ORAL at 09:03

## 2020-05-05 RX ADMIN — CARBIDOPA AND LEVODOPA 2 TABLET: 25; 100 TABLET ORAL at 17:25

## 2020-05-05 RX ADMIN — DULOXETINE HYDROCHLORIDE 120 MG: 60 CAPSULE, DELAYED RELEASE PELLETS ORAL at 09:03

## 2020-05-05 RX ADMIN — ATORVASTATIN CALCIUM 40 MG: 40 TABLET, FILM COATED ORAL at 23:25

## 2020-05-05 RX ADMIN — ASPIRIN 81 MG: 81 TABLET, COATED ORAL at 09:03

## 2020-05-05 RX ADMIN — HYDRALAZINE HYDROCHLORIDE 50 MG: 50 TABLET, FILM COATED ORAL at 23:26

## 2020-05-05 RX ADMIN — HYDRALAZINE HYDROCHLORIDE 50 MG: 50 TABLET, FILM COATED ORAL at 17:25

## 2020-05-05 RX ADMIN — VITAM B12 100 MCG: 100 TAB at 09:03

## 2020-05-05 RX ADMIN — SENNOSIDES AND DOCUSATE SODIUM 1 TABLET: 8.6; 5 TABLET ORAL at 09:03

## 2020-05-05 RX ADMIN — Medication 10 ML: at 23:27

## 2020-05-05 RX ADMIN — METOPROLOL SUCCINATE 50 MG: 50 TABLET, EXTENDED RELEASE ORAL at 23:26

## 2020-05-05 RX ADMIN — CLOPIDOGREL BISULFATE 75 MG: 75 TABLET ORAL at 09:03

## 2020-05-05 RX ADMIN — HEPARIN SODIUM 5000 UNITS: 5000 INJECTION INTRAVENOUS; SUBCUTANEOUS at 11:44

## 2020-05-05 RX ADMIN — CARBIDOPA AND LEVODOPA 2 TABLET: 25; 100 TABLET ORAL at 23:27

## 2020-05-05 RX ADMIN — REGADENOSON 0.4 MG: 0.08 INJECTION, SOLUTION INTRAVENOUS at 10:01

## 2020-05-05 RX ADMIN — CARBIDOPA AND LEVODOPA 2 TABLET: 25; 100 TABLET ORAL at 09:03

## 2020-05-05 RX ADMIN — ARIPIPRAZOLE 15 MG: 10 TABLET ORAL at 09:00

## 2020-05-05 RX ADMIN — PANTOPRAZOLE SODIUM 40 MG: 40 TABLET, DELAYED RELEASE ORAL at 09:03

## 2020-05-05 RX ADMIN — DIAZEPAM 2 MG: 2 TABLET ORAL at 09:03

## 2020-05-05 RX ADMIN — SODIUM CHLORIDE, SODIUM LACTATE, POTASSIUM CHLORIDE, AND CALCIUM CHLORIDE 75 ML/HR: 600; 310; 30; 20 INJECTION, SOLUTION INTRAVENOUS at 11:59

## 2020-05-05 RX ADMIN — Medication 10 ML: at 06:21

## 2020-05-05 RX ADMIN — HEPARIN SODIUM 5000 UNITS: 5000 INJECTION INTRAVENOUS; SUBCUTANEOUS at 18:25

## 2020-05-05 NOTE — PROGRESS NOTES
Problem: Falls - Risk of  Goal: *Absence of Falls  Description: Document Corbett Reason Fall Risk and appropriate interventions in the flowsheet. Outcome: Progressing Towards Goal  Note: Fall Risk Interventions:  Mobility Interventions: Bed/chair exit alarm, Communicate number of staff needed for ambulation/transfer, OT consult for ADLs, Patient to call before getting OOB, PT Consult for mobility concerns, PT Consult for assist device competence, Utilize walker, cane, or other assistive device         Medication Interventions: Bed/chair exit alarm, Evaluate medications/consider consulting pharmacy, Patient to call before getting OOB, Teach patient to arise slowly    Elimination Interventions: Call light in reach, Bed/chair exit alarm, Patient to call for help with toileting needs, Toilet paper/wipes in reach, Toileting schedule/hourly rounds    History of Falls Interventions: Bed/chair exit alarm, Consult care management for discharge planning, Door open when patient unattended, Evaluate medications/consider consulting pharmacy         Problem: Pressure Injury - Risk of  Goal: *Prevention of pressure injury  Description: Document Gregorio Scale and appropriate interventions in the flowsheet. Outcome: Progressing Towards Goal  Note: Pressure Injury Interventions:  Sensory Interventions: Assess need for specialty bed, Check visual cues for pain, Discuss PT/OT consult with provider, Float heels, Keep linens dry and wrinkle-free, Maintain/enhance activity level, Monitor skin under medical devices, Pad between skin to skin, Minimize linen layers, Sit a 90-degree angle/use footstool if needed, Turn and reposition approx.  every two hours (pillows and wedges if needed)    Moisture Interventions: Apply protective barrier, creams and emollients, Absorbent underpads, Check for incontinence Q2 hours and as needed, Internal/External urinary devices, Limit adult briefs, Maintain skin hydration (lotion/cream), Minimize layers    Activity Interventions: Chair cushion, Increase time out of bed, Pressure redistribution bed/mattress(bed type), PT/OT evaluation    Mobility Interventions: Chair cushion, PT/OT evaluation, HOB 30 degrees or less, Pressure redistribution bed/mattress (bed type), Turn and reposition approx.  every two hours(pillow and wedges)    Nutrition Interventions: Document food/fluid/supplement intake    Friction and Shear Interventions: Feet elevated on foot rest, HOB 30 degrees or less, Lift sheet, Minimize layers                Problem: General Medical Care Plan  Goal: *Vital signs within specified parameters  Outcome: Progressing Towards Goal  Goal: *Labs within defined limits  Outcome: Progressing Towards Goal  Goal: *Absence of infection signs and symptoms  Outcome: Progressing Towards Goal  Goal: *Optimal pain control at patient's stated goal  Outcome: Progressing Towards Goal     Problem: Pain  Goal: *Control of Pain  Outcome: Progressing Towards Goal

## 2020-05-05 NOTE — PROGRESS NOTES
Occupational Therapy  05/05/20    Chart reviewed. Attempted to see patient for OT treatment, however patient declining any participation in therapy this afternoon, reporting she is trying to have a bowel movement in the bed. Despite numerous attempts for OOB mobility, functional activity, or transfer to a [facility], patient continued to decline participation. Will follow up tomorrow with OT treatment as able & appropriate.      Thank you,   Shantanu Lopez, OTD, OTR/L

## 2020-05-05 NOTE — PROGRESS NOTES
Transition of Care Plan     Disposition:Therapy recommends-SNF placement: previous CM note indicates patient's refusal to go to rehab; may go to [facility] with home health   Patient DOES NOT want rehab; referral made for assisted living search; Andrew Campuzanoroman [phone] with [employer] is working to find an assisted living facility    RUR- 17 % Low  Risks    Transport: [facility] phone [phone] at discharge   [address] Follow up: PCP/Specialist(s)     Patient off the floor for a procedure, will try to see her again as able. 3:07 PM: Met patient at bedside and discussed disposition plan. Mrs. Ramos doesn't want skilled rehab; wants this writer to find an assisted living facility. A referral has been made to [facility] for search.          LADI De

## 2020-05-05 NOTE — PROGRESS NOTES
[address] [place] [address]               Division of Cardiology  [phone]                       Progress note              Yanique Real M.D., F.A.CRennyC. NAME:  Joseph Turner   :   1940   MRN:   [id number]         ICD-10-CM ICD-9-CM    1. Weakness R53.1 780.79    2. MORENO (acute kidney injury) ([facility] [address] N17.9 584.9                  HPI she feels about the same. She has had no chest pain or shortness of breath whatsoever but she is still feeling weak. Assessment/Plan:    1. CAD: She remains seemingly asymptomatic from cardiac standpoint despite elevation in troponin. Implication and significance of troponin elevation of been discussed with the patient. She was very reluctant to proceed with cardiac catheterization and given a preserved ejection fraction it was felt acceptable to proceed with nuclear stress test first.    Continue aspirin, Plavix and statin. Continue Toprol. If nuclear stress test reveals no ischemia and then continue medical therapy only and follow-up with  in 1 week from her discharge. 2.  Paroxysmal atrial fibrillation: She remains in normal sinus rhythm. Currently on Toprol. She was deemed not a candidate for oral anticoagulation in the past by Dr. Norberto Church on account of falls. Continue aspirin Plavix. Echo noted with normal ejection fraction moderate mitral regurgitation, mild tricuspid regurgitation moderate pulmonary hypertension. 3.  Hypertension: She remains hypertensive mostly at times. Continue same dose of Toprol increase his hydralazine to 50 mg 3 times a day. 4.  Weakness: Likely multifactorial.  Now on Toprol nightly instead of Coreg twice daily. Neurologic evaluation with brain MRI and MRI of the cervical spine noted no significant changes were found. 5.  Coronary artery stenosis: 80% stenosis of the right coronary artery.   Surgery has been placed on hold during the covid pandemic. CARDIAC STUDIES      04/30/20   ECHO ADULT COMPLETE 05/04/2020 5/4/2020    Narrative · Image quality for this study was technically difficult. · Normal cavity size and systolic function (ejection fraction normal). Upper normal wall thickness. Estimated left ventricular ejection fraction   is 55 - 60%. No regional wall motion abnormality noted. Mild (grade 1)   left ventricular diastolic dysfunction. · Moderately dilated left atrium. · Mildly reduced systolic function. · Mildly dilated right atrium. · Mild aortic valve regurgitation is present. · Mitral valve non-specific thickening. Moderate mitral annular   calcification. Moderate mitral valve regurgitation is present. · Mild tricuspid valve regurgitation is present. · Moderate pulmonary hypertension. Pulmonary arterial systolic pressure is   50 mmHg. Signed by: Jonelle Huang MD       04/30/20   NUCLEAR CARDIAC STRESS TEST 05/05/2020 5/5/2020    Narrative · Baseline ECG: Normal sinus rhythm, non-specific ST-T wave abnormalities. Signed by: Jonelle Huang MD             EKG Results     Procedure 720 Value Units Date/Time    EKG, 12 LEAD, INITIAL [[id number]] Collected:  05/01/20 0442    Order Status:  Completed Updated:  05/01/20 1353     Ventricular Rate 108 BPM      Atrial Rate 119 BPM      QRS Duration 106 ms      Q-T Interval 356 ms      QTC Calculation (Bezet) 477 ms      Calculated R Axis -46 degrees      Calculated T Axis 144 degrees      Diagnosis --     Atrial fibrillation with rapid ventricular response  Left axis deviation  Incomplete left bundle branch block  Moderate voltage criteria for LVH, may be normal variant  ST & T wave abnormality, consider lateral ischemia or digitalis effect  When compared with ECG of 30-APR-2020 19:29,  Atrial fibrillation has replaced Sinus rhythm  Vent.  rate has increased BY  50 BPM  Incomplete left bundle branch block is now present  ST now depressed in Lateral leads  Confirmed by Nuria Powell M.D., Ines Flannery ([id number]) on 5/1/2020 1:53:42 PM      EKG 12 LEAD INITIAL [[id number]] Collected:  04/30/20 1929    Order Status:  Completed Updated:  05/01/20 1348     Ventricular Rate 58 BPM      Atrial Rate 58 BPM      P-R Interval 158 ms      QRS Duration 100 ms      Q-T Interval 466 ms      QTC Calculation (Bezet) 457 ms      Calculated P Axis 9 degrees      Calculated R Axis -49 degrees      Calculated T Axis 117 degrees      Diagnosis --     Sinus bradycardia  Left anterior fascicular block  Voltage criteria for left ventricular hypertrophy  T wave abnormality, consider lateral ischemia  When compared with ECG of 18-MAR-2020 08:31,  T wave inversion more evident in Lateral leads  Confirmed by Nuria Powell M.D., Ines Flannery ([id number]) on 5/1/2020 1:48:33 PM              IMAGING      CT Results (most recent):  Results from [place] encounter on 04/30/20   CT PELV WO CONT    Narrative CT PELVIS  WITHOUT CONTRAST. 5/2/2020 12:21 PM     INDICATION: Fall. COMPARISON: 6/3/2014. TECHNIQUE: CT of the pelvis was performed without contrast. CT dose reduction  was achieved through use of a standardized protocol tailored for this  examination and automatic exposure control for dose modulation. Adaptive  statistical iterative reconstruction (ASIR) was utilized. FINDINGS:  No displaced fracture. The patient is osteopenic. A 42 mm AAA is partially imaged. Presacral edema is associated with a large  stool ball impacted in the rectum. This has mass effect on the uterus and  bladder. Impression IMPRESSION:  1. No displaced fracture. 2. Rectal fecal impaction. 3. AAA. XR Results (most recent):  Results from Hospital Encounter encounter on 04/30/20   XR CHEST PORT    Narrative EXAM: XR CHEST PORT    INDICATION: weakness    COMPARISON: 3/18/2020    FINDINGS: A portable AP radiograph of the chest was obtained at 1923 hours. There is no pneumothorax or pleural effusion.  The lungs are clear. Cardiac,  mediastinal and hilar contours are stable with unchanged mild-moderate  atherosclerotic thoracic aortic tortuosity. Superior median sternotomy wires  are again shown. The bones are severely osteopenic. Impression IMPRESSION: No acute findings. MRI Results (most recent):          Past Medical History:   Diagnosis Date    Anxiety disorder     Atrial fibrillation ([facility] [address]     CAD (coronary artery disease) 2007    stents, CABG x 3v    Carotid stenosis     Cervical stenosis of spinal canal 07/2019    Chronic kidney disease     Cough     CVA (cerebral vascular accident) ([facility] [address] 07/2019    left lacunar infarct at head of caudate    Depression     AND CHRONIC ANXIETY    Diabetes (HCC)     GERD (gastroesophageal reflux disease)     High cholesterol     History of peptic ulcer     Bleeding ulcer with increased NSAID use    Hypertension     Left carotid stenosis 07/2019    s/p left CEA with Dr. Sudarshan Conn MI, old 2007    PUD (peptic ulcer disease)     Stroke ([facility] [address]     Tremor     Valvular heart disease            Past Surgical History:   Procedure Laterality Date    CARDIAC SURG PROCEDURE UNLIST      CABG X3 VESSEL    HX CAROTID ENDARTERECTOMY  07/2019    HX CORONARY ARTERY BYPASS GRAFT      HX TONSILLECTOMY  1963    TOTAL KNEE ARTHROPLASTY Right 2015               Visit Vitals  /73   Pulse 60   Temp 97.9 °F (36.6 °C)   Resp 20   Ht 5' 5\" (1.651 m)   Wt 174 lb (78.9 kg)   SpO2 96%   BMI 28.96 kg/m²         Wt Readings from Last 3 Encounters:   05/05/20 174 lb (78.9 kg)   05/01/20 162 lb (73.5 kg)   03/18/20 160 lb (72.6 kg)         Review of Systems:   Pertinent items are noted in the History of Present Illness.          05/05 0701 - 05/05 1900  In: -   Out: 100 [Urine:100]    05/03 1901 - 05/05 0700  In: 720 [P.O.:720]  Out: 1240 [Urine:1240]      Telemetry: normal sinus rhythm    Physical Exam:    Heart: regular rate and rhythm  Lungs: clear to auscultation bilaterally  Extremities: edema 1+    Current Facility-Administered Medications   Medication Dose Route Frequency    metoprolol succinate (TOPROL-XL) XL tablet 50 mg  50 mg Oral QHS    hydrALAZINE (APRESOLINE) tablet 25 mg  25 mg Oral TID    senna-docusate (PERICOLACE) 8.6-50 mg per tablet 1 Tab  1 Tab Oral DAILY    polyethylene glycol (MIRALAX) packet 17 g  17 g Oral DAILY    ARIPiprazole (ABILIFY) tablet 15 mg  15 mg Oral DAILY    aspirin delayed-release tablet 81 mg  81 mg Oral DAILY    atorvastatin (LIPITOR) tablet 40 mg  40 mg Oral QHS    carbidopa-levodopa (SINEMET)  mg per tablet 2 Tab  2 Tab Oral QID    clopidogreL (PLAVIX) tablet 75 mg  75 mg Oral DAILY AFTER BREAKFAST    cyanocobalamin (VITAMIN B12) tablet 100 mcg  100 mcg Oral DAILY    diazePAM (VALIUM) tablet 2 mg  2 mg Oral TID    DULoxetine (CYMBALTA) capsule 120 mg  120 mg Oral DAILY    pantoprazole (PROTONIX) tablet 40 mg  40 mg Oral ACB    nitroglycerin (NITROSTAT) tablet 0.4 mg  0.4 mg SubLINGual Q5MIN PRN    sodium chloride (NS) flush 5-40 mL  5-40 mL IntraVENous Q8H    sodium chloride (NS) flush 5-40 mL  5-40 mL IntraVENous PRN    ondansetron (ZOFRAN) injection 4 mg  4 mg IntraVENous Q4H PRN    bisacodyL (DULCOLAX) tablet 5 mg  5 mg Oral DAILY PRN    heparin (porcine) injection 5,000 Units  5,000 Units SubCUTAneous Q8H    acetaminophen (TYLENOL) tablet 650 mg  650 mg Oral Q4H PRN    lactated Ringers infusion  75 mL/hr IntraVENous CONTINUOUS         All Cardiac Markers in the last 24 hours:  No results found for: CPK, CK, CKMMB, CKMB, RCK3, CKMBT, CKMBPOC, CKNDX, CKND1, MONSTER, TROPT, TROIQ, ANABELLE, TROPT, TNIPOC, BNP, BNPP, BNPNT     Lab Results   Component Value Date/Time    Creatinine (POC) 1.6 (H) 02/24/2020 10:32 AM    Creatinine 1.34 (H) 05/04/2020 03:12 AM          Lab Results   Component Value Date/Time    CK 75 07/11/2019 09:35 AM    CK - MB 2.7 07/11/2019 09:35 AM    CK-MB Index 3.6 (H) 07/11/2019 09:35 AM Troponin-I, Qt. 1.19 (H) 05/02/2020 07:07 AM     (H) 06/08/2014 04:12 AM         Lab Results   Component Value Date/Time    WBC 7.2 05/02/2020 03:14 AM    HGB 10.3 (L) 05/02/2020 03:14 AM    HCT 31.4 (L) 05/02/2020 03:14 AM    PLATELET 178 38/90/2612 03:14 AM    MCV 91.8 05/02/2020 03:14 AM         Lab Results   Component Value Date/Time    Sodium 139 05/04/2020 03:12 AM    Potassium 4.2 05/04/2020 03:12 AM    Chloride 107 05/04/2020 03:12 AM    CO2 26 05/04/2020 03:12 AM    Anion gap 6 05/04/2020 03:12 AM    Glucose 143 (H) 05/04/2020 03:12 AM    BUN 16 05/04/2020 03:12 AM    Creatinine 1.34 (H) 05/04/2020 03:12 AM    BUN/Creatinine ratio 12 05/04/2020 03:12 AM    GFR est AA 46 (L) 05/04/2020 03:12 AM    GFR est non-AA 38 (L) 05/04/2020 03:12 AM    Calcium 9.7 05/04/2020 03:12 AM         Lab Results   Component Value Date/Time     (H) 06/08/2014 04:12 AM     (H) 06/04/2014 04:44 AM    NT pro-BNP 1,941 (H) 07/11/2019 09:35 AM         Lab Results   Component Value Date/Time    Cholesterol, total 129 05/01/2020 06:37 AM    HDL Cholesterol 36 05/01/2020 06:37 AM    LDL, calculated 74.6 05/01/2020 06:37 AM    VLDL, calculated 18.4 05/01/2020 06:37 AM    Triglyceride 92 05/01/2020 06:37 AM    CHOL/HDL Ratio 3.6 05/01/2020 06:37 AM         Lab Results   Component Value Date/Time    ALT (SGPT) 15 05/01/2020 06:37 AM    AST (SGOT) 28 05/01/2020 06:37 AM    Alk.  phosphatase 88 05/01/2020 06:37 AM    Bilirubin, total 0.5 05/01/2020 06:37 AM

## 2020-05-05 NOTE — PROGRESS NOTES
Physical Therapy  5/5/2020    Chart reviewed. Attempted to see patient for PT. She stated she is currently \"pooping\" and when asked if she had a bedpan under her she said \"no. \" Stated she did not know she was going to have to have a bowel movement. Refused bed pan and stated she was not finished when PT attempted to help her get cleaned up, also stated \"It's hard to finish when someone is standing over me. \" Excused self from room. Assisted patient with changing channel of her TV to program of her choice, which she was also very flat and demanding about. F/u later as able/appropriate. Thank you.     Sylvie Arce, PT, DPT

## 2020-05-05 NOTE — CDMP QUERY
*5/5 new question Pt admitted with General weakness and noted multiple falls. Pt with hx of Parkinsons Dz and also CVA, If possible, please document in the progress notes and discharge summary if you are evaluating and / or treating any of the followin. Progression of Parkinson's Disease 2. Late Effects of CVA 3. Age Related Physical Debility 4. Weakness/falls due to other ________ (please specify) 5. Clinically unable to determine The medical record reflects the following: 
   Risk Factors: hx of Parkinson's Disease, CVA Clinical Indicators: increased weakness, multiple falls. MRI negative for CVA or acute process, MRI C-Spine showing spondylosis. Neuro was consulted and has documented. 'Parkinsons symptoms worsening' Treatment: Sinemet QID, PT/OT eval and treatment, fall precautions, SNF vs skilled nursing placement at discharge.  
 
Thank you, Liv MIGUEL

## 2020-05-05 NOTE — PROGRESS NOTES
Problem: Pressure Injury - Risk of  Goal: *Prevention of pressure injury  Description: Document Gregorio Scale and appropriate interventions in the flowsheet. Outcome: Progressing Towards Goal  Note: Pressure Injury Interventions:  Sensory Interventions: Assess changes in LOC, Keep linens dry and wrinkle-free, Minimize linen layers    Moisture Interventions: Check for incontinence Q2 hours and as needed, Minimize layers    Activity Interventions: PT/OT evaluation, Increase time out of bed    Mobility Interventions: PT/OT evaluation, Turn and reposition approx.  every two hours(pillow and wedges)    Nutrition Interventions: Document food/fluid/supplement intake    Friction and Shear Interventions: Lift sheet                Problem: Patient Education: Go to Patient Education Activity  Goal: Patient/Family Education  Outcome: Progressing Towards Goal

## 2020-05-05 NOTE — PROGRESS NOTES
Bedside and Verbal shift change report given to [facility] (oncoming nurse) by CORNELIUS MORRIS [facility] RN (offgoing nurse). Report included the following information SBAR, Kardex, ED Summary, Intake/Output, MAR, Cardiac Rhythm NSR and Dual Neuro Assessment.

## 2020-05-05 NOTE — ROUTINE PROCESS
Bedside and Verbal shift change report given to Ayesha Perez RN  (oncoming nurse) by Sona Mendez RN  (offgoing nurse). Report included the following information SBAR, Kardex, ED Summary, Procedure Summary, Intake/Output, MAR, Recent Results, Med Rec Status, Cardiac Rhythm NSR. , Alarm Parameters , Quality Measures and Dual Neuro Assessment.

## 2020-05-05 NOTE — PROGRESS NOTES
[address] [facility]  Hospitalist Group                                                                                          Hospitalist Progress Note  Tyler Johnson MD  Answering service: [phone] OR [phone] from in house phone        Date of Service:  2020  NAME:  Merlinda Moh  :  1940  MRN:  [id number]      Admission Summary:     Patient was in her usual state of health until a couple of weeks ago when the patient developed weakness.  The patient described the weakness as generalized weakness, progressive and getting worse.  The patient is unable to walk.  The patient gets meal delivery by [employer] once a day.  The patient stated that she has no appetite and she has not been drinking. Sarah Helton also complained of cough, which is nonproductive, not associated with fever, rigors, or chills.  As a result of the weakness, the patient fell at home a couple of times.  The last fall was about 2 weeks ago.  Complaining of generalized body aches and pain as well as discomfort in her neck.  The patient was last admitted to this hospital from 2019 to 2019.  The patient was admitted and treated for acute CVA.  The patient has left carotid artery stenosis, underwent endarterectomy in the past.  According to the patient, the left carotid artery is now blocked again and the patient is awaiting revascularization of the left carotid artery.  The procedure was postponed because of the COVID-19 pandemic.  When the patient arrived at the emergency room, chest x-ray was obtained.  The chest x-ray was without any acute findings.  The patient was not able to ambulate, was referred to the hospitalist service for evaluation for admission. Interval history / Subjective:       Patient still generally weak. But otherwise denies any pain.      Assessment & Plan:     Generalized weakness  -Status post mechanical fall from weakness  -Differentials include progression of Parkinson, CVA, myelopathy  -Initial head CT without acute abnormality, MRI of the brain negative for CVA, MRI of the C-spine shows spondylosis with mild spinal stenosis at the remaining to mild cord compression most pronounced at C4-5. No significant change since 7/2019  -PT following the patient. SNF vs senior care placement, case management to follow    Parkinson's disease  -Continue Sinemet    Coronary artery disease  -With elevated troponin upon admission with EKG changes  -Cardiology evaluating the patient  -Stress test shows negative for ischemia    Acute kidney injury  -Patient has baseline CKD  -Improving with IV fluid    Hypertension  -Blood pressure improving, continue monitor    Atrial fibrillation  -Paroxysmal atrial fibrillation  -Currently in sinus rhythm  -Patient is not a candidate for anticoagulation per cardiology    Carotic artery stenosis  -Plan for outpatient surgery    Dyslipidemia  -Statin    Anxiety and depression  -Stable    Anemia  -Anemia of chronic kidney disease  -H&H stable    Code status: FULL  DVT prophylaxis: Heparin    Care Plan discussed with: Patient/Family and Nurse  Anticipated Disposition: SNF/LTC  Anticipated Discharge: 24 - 48 hr     Hospital Problems  Date Reviewed: 5/1/2020          Codes Class Noted POA    Weakness ICD-10-CM: R53.1  ICD-9-CM: 780.79  5/4/2020 Unknown        * (Principal) Generalized weakness ICD-10-CM: R53.1  ICD-9-CM: 780.79  4/30/2020 Yes                Review of Systems:   A comprehensive review of systems was negative except for that written in the HPI. Vital Signs:    Last 24hrs VS reviewed since prior progress note.  Most recent are:  Visit Vitals  /67 (BP 1 Location: Right arm, BP Patient Position: At rest)   Pulse 70   Temp 98.3 °F (36.8 °C)   Resp 21   Ht 5' 5\" (1.651 m)   Wt 78.9 kg (174 lb)   SpO2 96%   BMI 28.96 kg/m²         Intake/Output Summary (Last 24 hours) at 5/5/2020 1415  Last data filed at 5/5/2020 1200  Gross per 24 hour   Intake 240 ml Output 1440 ml   Net -1200 ml        Physical Examination:             Constitutional:  No acute distress, cooperative, pleasant    ENT:  Oral mucosa moist, oropharynx benign. Resp:  CTA bilaterally. No wheezing/rhonchi/rales. No accessory muscle use   CV:  Regular rhythm, normal rate, no murmurs, gallops, rubs    GI:  Soft, non distended, non tender. normoactive bowel sounds, no hepatosplenomegaly     Musculoskeletal:  No edema, warm, 2+ pulses throughout    Neurologic:  Moves all extremities. AAOx3, CN II-XII reviewed     Skin:  Good turgor, no rashes or ulcers       Data Review:    Review and/or order of clinical lab test      Labs:     No results for input(s): WBC, HGB, HCT, PLT, HGBEXT, HCTEXT, PLTEXT, HGBEXT, HCTEXT, PLTEXT in the last 72 hours. Recent Labs     05/04/20  0312      K 4.2      CO2 26   BUN 16   CREA 1.34*   *   CA 9.7     No results for input(s): SGOT, GPT, ALT, AP, TBIL, TBILI, TP, ALB, GLOB, GGT, AML, LPSE in the last 72 hours. No lab exists for component: AMYP, HLPSE  No results for input(s): INR, PTP, APTT, INREXT, INREXT in the last 72 hours. No results for input(s): FE, TIBC, PSAT, FERR in the last 72 hours. No results found for: FOL, RBCF   No results for input(s): PH, PCO2, PO2 in the last 72 hours. No results for input(s): CPK, CKNDX, TROIQ in the last 72 hours.     No lab exists for component: CPKMB  Lab Results   Component Value Date/Time    Cholesterol, total 129 05/01/2020 06:37 AM    HDL Cholesterol 36 05/01/2020 06:37 AM    LDL, calculated 74.6 05/01/2020 06:37 AM    Triglyceride 92 05/01/2020 06:37 AM    CHOL/HDL Ratio 3.6 05/01/2020 06:37 AM     Lab Results   Component Value Date/Time    Glucose (POC) 139 (H) 07/26/2019 04:34 PM    Glucose (POC) 126 (H) 07/26/2019 11:01 AM    Glucose (POC) 94 07/12/2019 05:40 PM    Glucose (POC) 135 (H) 02/17/2015 06:46 AM    Glucose (POC) 129 (H) 02/16/2015 09:58 PM     Lab Results   Component Value Date/Time Color DARK YELLOW 04/30/2020 07:57 PM    Appearance CLEAR 04/30/2020 07:57 PM    Specific gravity 1.025 04/30/2020 07:57 PM    pH (UA) 5.0 04/30/2020 07:57 PM    Protein 100 (A) 04/30/2020 07:57 PM    Glucose Negative 04/30/2020 07:57 PM    Ketone 15 (A) 04/30/2020 07:57 PM    Bilirubin NEGATIVE  07/11/2019 10:17 AM    Urobilinogen 1.0 04/30/2020 07:57 PM    Nitrites Negative 04/30/2020 07:57 PM    Leukocyte Esterase Negative 04/30/2020 07:57 PM    Epithelial cells FEW 04/30/2020 07:57 PM    Bacteria Negative 04/30/2020 07:57 PM    WBC 0-4 04/30/2020 07:57 PM    RBC 0-5 04/30/2020 07:57 PM         Medications Reviewed:     Current Facility-Administered Medications   Medication Dose Route Frequency    hydrALAZINE (APRESOLINE) tablet 50 mg  50 mg Oral TID    metoprolol succinate (TOPROL-XL) XL tablet 50 mg  50 mg Oral QHS    senna-docusate (PERICOLACE) 8.6-50 mg per tablet 1 Tab  1 Tab Oral DAILY    polyethylene glycol (MIRALAX) packet 17 g  17 g Oral DAILY    ARIPiprazole (ABILIFY) tablet 15 mg  15 mg Oral DAILY    aspirin delayed-release tablet 81 mg  81 mg Oral DAILY    atorvastatin (LIPITOR) tablet 40 mg  40 mg Oral QHS    carbidopa-levodopa (SINEMET)  mg per tablet 2 Tab  2 Tab Oral QID    clopidogreL (PLAVIX) tablet 75 mg  75 mg Oral DAILY AFTER BREAKFAST    cyanocobalamin (VITAMIN B12) tablet 100 mcg  100 mcg Oral DAILY    diazePAM (VALIUM) tablet 2 mg  2 mg Oral TID    DULoxetine (CYMBALTA) capsule 120 mg  120 mg Oral DAILY    pantoprazole (PROTONIX) tablet 40 mg  40 mg Oral ACB    nitroglycerin (NITROSTAT) tablet 0.4 mg  0.4 mg SubLINGual Q5MIN PRN    sodium chloride (NS) flush 5-40 mL  5-40 mL IntraVENous Q8H    sodium chloride (NS) flush 5-40 mL  5-40 mL IntraVENous PRN    ondansetron (ZOFRAN) injection 4 mg  4 mg IntraVENous Q4H PRN    bisacodyL (DULCOLAX) tablet 5 mg  5 mg Oral DAILY PRN    heparin (porcine) injection 5,000 Units  5,000 Units SubCUTAneous Q8H    acetaminophen (TYLENOL) tablet 650 mg  650 mg Oral Q4H PRN    lactated Ringers infusion  75 mL/hr IntraVENous CONTINUOUS     ______________________________________________________________________  EXPECTED LENGTH OF STAY: - - -  ACTUAL LENGTH OF STAY:          5                 Sona Bhakta MD

## 2020-05-06 PROCEDURE — 74011250636 HC RX REV CODE- 250/636: Performed by: FAMILY MEDICINE

## 2020-05-06 PROCEDURE — 74011250636 HC RX REV CODE- 250/636: Performed by: INTERNAL MEDICINE

## 2020-05-06 PROCEDURE — 74011250637 HC RX REV CODE- 250/637: Performed by: HOSPITALIST

## 2020-05-06 PROCEDURE — 74011250637 HC RX REV CODE- 250/637: Performed by: INTERNAL MEDICINE

## 2020-05-06 PROCEDURE — 74011250637 HC RX REV CODE- 250/637: Performed by: SPECIALIST

## 2020-05-06 PROCEDURE — 65270000029 HC RM PRIVATE

## 2020-05-06 RX ADMIN — CLOPIDOGREL BISULFATE 75 MG: 75 TABLET ORAL at 08:09

## 2020-05-06 RX ADMIN — SODIUM CHLORIDE, SODIUM LACTATE, POTASSIUM CHLORIDE, AND CALCIUM CHLORIDE 75 ML/HR: 600; 310; 30; 20 INJECTION, SOLUTION INTRAVENOUS at 13:37

## 2020-05-06 RX ADMIN — HEPARIN SODIUM 5000 UNITS: 5000 INJECTION INTRAVENOUS; SUBCUTANEOUS at 18:44

## 2020-05-06 RX ADMIN — CARBIDOPA AND LEVODOPA 2 TABLET: 25; 100 TABLET ORAL at 18:43

## 2020-05-06 RX ADMIN — DIAZEPAM 2 MG: 2 TABLET ORAL at 21:36

## 2020-05-06 RX ADMIN — VITAM B12 100 MCG: 100 TAB at 08:09

## 2020-05-06 RX ADMIN — POLYETHYLENE GLYCOL 3350 17 G: 17 POWDER, FOR SOLUTION ORAL at 08:08

## 2020-05-06 RX ADMIN — HEPARIN SODIUM 5000 UNITS: 5000 INJECTION INTRAVENOUS; SUBCUTANEOUS at 03:21

## 2020-05-06 RX ADMIN — ASPIRIN 81 MG: 81 TABLET, COATED ORAL at 08:09

## 2020-05-06 RX ADMIN — DIAZEPAM 2 MG: 2 TABLET ORAL at 15:24

## 2020-05-06 RX ADMIN — Medication 10 ML: at 14:00

## 2020-05-06 RX ADMIN — Medication 10 ML: at 06:13

## 2020-05-06 RX ADMIN — SENNOSIDES AND DOCUSATE SODIUM 1 TABLET: 8.6; 5 TABLET ORAL at 08:09

## 2020-05-06 RX ADMIN — PANTOPRAZOLE SODIUM 40 MG: 40 TABLET, DELAYED RELEASE ORAL at 08:09

## 2020-05-06 RX ADMIN — CARBIDOPA AND LEVODOPA 2 TABLET: 25; 100 TABLET ORAL at 12:39

## 2020-05-06 RX ADMIN — DULOXETINE HYDROCHLORIDE 120 MG: 60 CAPSULE, DELAYED RELEASE PELLETS ORAL at 08:09

## 2020-05-06 RX ADMIN — METOPROLOL SUCCINATE 50 MG: 50 TABLET, EXTENDED RELEASE ORAL at 21:33

## 2020-05-06 RX ADMIN — CARBIDOPA AND LEVODOPA 2 TABLET: 25; 100 TABLET ORAL at 08:09

## 2020-05-06 RX ADMIN — ARIPIPRAZOLE 15 MG: 10 TABLET ORAL at 08:08

## 2020-05-06 RX ADMIN — HYDRALAZINE HYDROCHLORIDE 50 MG: 50 TABLET, FILM COATED ORAL at 21:34

## 2020-05-06 RX ADMIN — HEPARIN SODIUM 5000 UNITS: 5000 INJECTION INTRAVENOUS; SUBCUTANEOUS at 10:10

## 2020-05-06 RX ADMIN — HYDRALAZINE HYDROCHLORIDE 50 MG: 50 TABLET, FILM COATED ORAL at 08:09

## 2020-05-06 RX ADMIN — HYDRALAZINE HYDROCHLORIDE 50 MG: 50 TABLET, FILM COATED ORAL at 15:24

## 2020-05-06 RX ADMIN — CARBIDOPA AND LEVODOPA 2 TABLET: 25; 100 TABLET ORAL at 21:34

## 2020-05-06 RX ADMIN — ATORVASTATIN CALCIUM 40 MG: 40 TABLET, FILM COATED ORAL at 21:34

## 2020-05-06 RX ADMIN — DIAZEPAM 2 MG: 2 TABLET ORAL at 08:12

## 2020-05-06 RX ADMIN — SODIUM CHLORIDE, SODIUM LACTATE, POTASSIUM CHLORIDE, AND CALCIUM CHLORIDE 75 ML/HR: 600; 310; 30; 20 INJECTION, SOLUTION INTRAVENOUS at 01:35

## 2020-05-06 NOTE — PROGRESS NOTES
Bedside shift change report given to [address] [place] [address] (oncoming nurse) by Sylvia Hernandez (offgoing nurse). Report included the following information SBAR, MAR, Recent Results and Cardiac Rhythm Sinus bradycardia/NSR.

## 2020-05-06 NOTE — PROGRESS NOTES
Problem: Falls - Risk of  Goal: *Absence of Falls  Description: Document Radha Soni Fall Risk and appropriate interventions in the flowsheet.   Outcome: Progressing Towards Goal  Note: Fall Risk Interventions:  Mobility Interventions: Bed/chair exit alarm         Medication Interventions: Bed/chair exit alarm    Elimination Interventions: Bed/chair exit alarm    History of Falls Interventions: Bed/chair exit alarm

## 2020-05-06 NOTE — PROGRESS NOTES
Spiritual Care Assessment/Progress Note  [facility]      NAME: Anushka Ponce      MRN: [id number]  AGE: 67 y.o. SEX: female  Judaism Affiliation: Latter-day   Language: English     5/6/2020     Total Time (in minutes): 8     Spiritual Assessment begun in [address] through conversation with:         []Patient        [] Family    [] Friend(s)        Reason for Consult: Initial/Spiritual assessment, patient floor     Spiritual beliefs: (Please include comment if needed)     [] Identifies with a avtar tradition:         [] Supported by a avtar community:            [] Claims no spiritual orientation:           [] Seeking spiritual identity:                [] Adheres to an individual form of spirituality:           [x] Not able to assess:                           Identified resources for coping:      [] Prayer                               [] Music                  [] Guided Imagery     [] Family/friends                 [] Pet visits     [] Devotional reading                         [x] Unknown     [] Other:                                             Interventions offered during this visit: (See comments for more details)                Plan of Care:     [] Support spiritual and/or cultural needs    [] Support AMD and/or advance care planning process      [] Support grieving process   [] Coordinate Rites and/or Rituals    [] Coordination with community clergy   [] No spiritual needs identified at this time   [] Detailed Plan of Care below (See Comments)  [] Make referral to Music Therapy  [] Make referral to Pet Therapy     [] Make referral to Addiction services  [] Make referral to [facility]  [] Make referral to Spiritual Care Partner  [] No future visits requested        [x] Follow up visits as needed     Comments:  visit for initial spiritual assessment. Patient resting comfortably on bed with television on low volume.   Will continue to follow up as needed and upon request as able.    Visited by Rev. Fernanda Scott MDiv, [facility], [place], [facility] paging service: [phone])

## 2020-05-06 NOTE — PROGRESS NOTES
Problem: Falls - Risk of  Goal: *Absence of Falls  Description: Document Leslie Mancilla Fall Risk and appropriate interventions in the flowsheet. Outcome: Progressing Towards Goal  Note: Fall Risk Interventions:  Mobility Interventions: Patient to call before getting OOB, PT Consult for assist device competence, Strengthening exercises (ROM-active/passive)         Medication Interventions: Evaluate medications/consider consulting pharmacy, Patient to call before getting OOB, Teach patient to arise slowly    Elimination Interventions: Call light in reach, Stay With Me (per policy), Toileting schedule/hourly rounds    History of Falls Interventions: Bed/chair exit alarm, Consult care management for discharge planning, Room close to nurse's station         Problem: Patient Education: Go to Patient Education Activity  Goal: Patient/Family Education  Outcome: Progressing Towards Goal     Problem: Pressure Injury - Risk of  Goal: *Prevention of pressure injury  Description: Document Gregorio Scale and appropriate interventions in the flowsheet.   Outcome: Progressing Towards Goal  Note: Pressure Injury Interventions:  Sensory Interventions: Assess changes in LOC, Keep linens dry and wrinkle-free, Check visual cues for pain, Minimize linen layers    Moisture Interventions: Absorbent underpads, Apply protective barrier, creams and emollients, Internal/External urinary devices, Maintain skin hydration (lotion/cream)    Activity Interventions: Pressure redistribution bed/mattress(bed type), PT/OT evaluation    Mobility Interventions: Assess need for specialty bed, PT/OT evaluation, Pressure redistribution bed/mattress (bed type)    Nutrition Interventions: Document food/fluid/supplement intake    Friction and Shear Interventions: Apply protective barrier, creams and emollients, HOB 30 degrees or less                Problem: Patient Education: Go to Patient Education Activity  Goal: Patient/Family Education  Outcome: Progressing Towards Goal     Problem: General Medical Care Plan  Goal: *Vital signs within specified parameters  Outcome: Progressing Towards Goal  Goal: *Labs within defined limits  Outcome: Progressing Towards Goal  Goal: *Absence of infection signs and symptoms  Outcome: Progressing Towards Goal  Goal: *Optimal pain control at patient's stated goal  Outcome: Progressing Towards Goal  Goal: *Skin integrity maintained  Outcome: Progressing Towards Goal  Goal: *Fluid volume balance  Outcome: Progressing Towards Goal  Goal: *Optimize nutritional status  Outcome: Progressing Towards Goal  Goal: *Anxiety reduced or absent  Outcome: Progressing Towards Goal  Goal: *Progressive mobility and function (eg: ADL's)  Outcome: Progressing Towards Goal     Problem: Patient Education: Go to Patient Education Activity  Goal: Patient/Family Education  Outcome: Progressing Towards Goal     Problem: Pain  Goal: *Control of Pain  Outcome: Progressing Towards Goal     Problem: Patient Education: Go to Patient Education Activity  Goal: Patient/Family Education  Outcome: Progressing Towards Goal     Problem: Pain - Acute  Goal: *Control of acute pain  Outcome: Progressing Towards Goal     Problem: Patient Education: Go to Patient Education Activity  Goal: Patient/Family Education  Outcome: Progressing Towards Goal

## 2020-05-06 NOTE — PROGRESS NOTES
Bedside and Verbal shift change report given to LAMONT Potter (oncoming nurse) by Marcelo Sapp RN (offgoing nurse). Report included the following information SBAR, Kardex, Intake/Output, MAR, Recent Results, Med Rec Status, Cardiac Rhythm SR/SB and Dual Neuro Assessment.

## 2020-05-06 NOTE — PROGRESS NOTES
[address] [facility] Adult  Hospitalist Group                                                                                          Hospitalist Progress Note  Chula Bhatti MD  Answering service: [phone] OR [phone] from in house phone        Date of Service:  2020  NAME:  Natalya Cox  :  1940  MRN:  [id number]      Admission Summary:     Patient was in her usual state of health until a couple of weeks ago when the patient developed weakness.  The patient described the weakness as generalized weakness, progressive and getting worse.  The patient is unable to walk.  The patient gets meal delivery by [employer] once a day.  The patient stated that she has no appetite and she has not been drinking. Des Boyd also complained of cough, which is nonproductive, not associated with fever, rigors, or chills.  As a result of the weakness, the patient fell at home a couple of times.  The last fall was about 2 weeks ago.  Complaining of generalized body aches and pain as well as discomfort in her neck.  The patient was last admitted to this hospital from 2019 to 2019.  The patient was admitted and treated for acute CVA.  The patient has left carotid artery stenosis, underwent endarterectomy in the past.  According to the patient, the left carotid artery is now blocked again and the patient is awaiting revascularization of the left carotid artery.  The procedure was postponed because of the COVID-19 pandemic.  When the patient arrived at the emergency room, chest x-ray was obtained.  The chest x-ray was without any acute findings.  The patient was not able to ambulate, was referred to the hospitalist service for evaluation for admission. Interval history / Subjective:       Patient still generally weak. But otherwise denies any pain.      Assessment & Plan:     Generalized weakness  -Status post mechanical fall from weakness  -Secondary to progression of Parkinson  -Initial head CT without acute abnormality, MRI of the brain negative for CVA, MRI of the C-spine shows spondylosis with mild spinal stenosis at the remaining to mild cord compression most pronounced at C4-5. No significant change since 7/2019  -PT following the patient. Patient now agreeable to SNF    Parkinson's disease  -Continue Sinemet    Coronary artery disease  -With elevated troponin upon admission with EKG changes  -Cardiology evaluating the patient  -Stress test shows negative for ischemia    Acute kidney injury  -Patient has baseline CKD  -Improving with IV fluid    Hypertension  -Blood pressure improving, continue monitor    Atrial fibrillation  -Paroxysmal atrial fibrillation  -Currently in sinus rhythm  -Patient is not a candidate for anticoagulation per cardiology    Carotic artery stenosis  -Plan for outpatient surgery    Dyslipidemia  -Statin    Anxiety and depression  -Stable    Anemia  -Anemia of chronic kidney disease  -H&H stable    Code status: FULL  DVT prophylaxis: Heparin    Care Plan discussed with: Patient/Family and Nurse  Anticipated Disposition: SNF/LTC  Anticipated Discharge: 24 - 48 hr     Hospital Problems  Date Reviewed: 5/1/2020          Codes Class Noted POA    Weakness ICD-10-CM: R53.1  ICD-9-CM: 780.79  5/4/2020 Unknown        * (Principal) Generalized weakness ICD-10-CM: R53.1  ICD-9-CM: 780.79  4/30/2020 Yes                Review of Systems:   A comprehensive review of systems was negative except for that written in the HPI. Vital Signs:    Last 24hrs VS reviewed since prior progress note.  Most recent are:  Visit Vitals  /49   Pulse 61   Temp 98.3 °F (36.8 °C)   Resp 20   Ht 5' 5\" (1.651 m)   Wt 79.6 kg (175 lb 7.8 oz)   SpO2 98%   BMI 29.20 kg/m²         Intake/Output Summary (Last 24 hours) at 5/6/2020 1548  Last data filed at 5/6/2020 0812  Gross per 24 hour   Intake 6738.33 ml   Output 1751 ml   Net 4987.33 ml        Physical Examination:             Constitutional:  No acute distress, cooperative, pleasant    ENT:  Oral mucosa moist, oropharynx benign. Resp:  CTA bilaterally. No wheezing/rhonchi/rales. No accessory muscle use   CV:  Regular rhythm, normal rate, no murmurs, gallops, rubs    GI:  Soft, non distended, non tender. normoactive bowel sounds, no hepatosplenomegaly     Musculoskeletal:  No edema, warm, 2+ pulses throughout    Neurologic:  Moves all extremities. AAOx3, CN II-XII reviewed     Skin:  Good turgor, no rashes or ulcers       Data Review:    Review and/or order of clinical lab test      Labs:     No results for input(s): WBC, HGB, HCT, PLT, HGBEXT, HCTEXT, PLTEXT, HGBEXT, HCTEXT, PLTEXT in the last 72 hours. Recent Labs     05/04/20  0312      K 4.2      CO2 26   BUN 16   CREA 1.34*   *   CA 9.7     No results for input(s): SGOT, GPT, ALT, AP, TBIL, TBILI, TP, ALB, GLOB, GGT, AML, LPSE in the last 72 hours. No lab exists for component: AMYP, HLPSE  No results for input(s): INR, PTP, APTT, INREXT, INREXT in the last 72 hours. No results for input(s): FE, TIBC, PSAT, FERR in the last 72 hours. No results found for: FOL, RBCF   No results for input(s): PH, PCO2, PO2 in the last 72 hours. No results for input(s): CPK, CKNDX, TROIQ in the last 72 hours.     No lab exists for component: CPKMB  Lab Results   Component Value Date/Time    Cholesterol, total 129 05/01/2020 06:37 AM    HDL Cholesterol 36 05/01/2020 06:37 AM    LDL, calculated 74.6 05/01/2020 06:37 AM    Triglyceride 92 05/01/2020 06:37 AM    CHOL/HDL Ratio 3.6 05/01/2020 06:37 AM     Lab Results   Component Value Date/Time    Glucose (POC) 139 (H) 07/26/2019 04:34 PM    Glucose (POC) 126 (H) 07/26/2019 11:01 AM    Glucose (POC) 94 07/12/2019 05:40 PM    Glucose (POC) 135 (H) 02/17/2015 06:46 AM    Glucose (POC) 129 (H) 02/16/2015 09:58 PM     Lab Results   Component Value Date/Time    Color DARK YELLOW 04/30/2020 07:57 PM    Appearance CLEAR 04/30/2020 07:57 PM    Specific gravity 1.025 04/30/2020 07:57 PM    pH (UA) 5.0 04/30/2020 07:57 PM    Protein 100 (A) 04/30/2020 07:57 PM    Glucose Negative 04/30/2020 07:57 PM    Ketone 15 (A) 04/30/2020 07:57 PM    Bilirubin NEGATIVE  07/11/2019 10:17 AM    Urobilinogen 1.0 04/30/2020 07:57 PM    Nitrites Negative 04/30/2020 07:57 PM    Leukocyte Esterase Negative 04/30/2020 07:57 PM    Epithelial cells FEW 04/30/2020 07:57 PM    Bacteria Negative 04/30/2020 07:57 PM    WBC 0-4 04/30/2020 07:57 PM    RBC 0-5 04/30/2020 07:57 PM         Medications Reviewed:     Current Facility-Administered Medications   Medication Dose Route Frequency    hydrALAZINE (APRESOLINE) tablet 50 mg  50 mg Oral TID    metoprolol succinate (TOPROL-XL) XL tablet 50 mg  50 mg Oral QHS    senna-docusate (PERICOLACE) 8.6-50 mg per tablet 1 Tab  1 Tab Oral DAILY    polyethylene glycol (MIRALAX) packet 17 g  17 g Oral DAILY    ARIPiprazole (ABILIFY) tablet 15 mg  15 mg Oral DAILY    aspirin delayed-release tablet 81 mg  81 mg Oral DAILY    atorvastatin (LIPITOR) tablet 40 mg  40 mg Oral QHS    carbidopa-levodopa (SINEMET)  mg per tablet 2 Tab  2 Tab Oral QID    clopidogreL (PLAVIX) tablet 75 mg  75 mg Oral DAILY AFTER BREAKFAST    cyanocobalamin (VITAMIN B12) tablet 100 mcg  100 mcg Oral DAILY    diazePAM (VALIUM) tablet 2 mg  2 mg Oral TID    DULoxetine (CYMBALTA) capsule 120 mg  120 mg Oral DAILY    pantoprazole (PROTONIX) tablet 40 mg  40 mg Oral ACB    nitroglycerin (NITROSTAT) tablet 0.4 mg  0.4 mg SubLINGual Q5MIN PRN    sodium chloride (NS) flush 5-40 mL  5-40 mL IntraVENous Q8H    sodium chloride (NS) flush 5-40 mL  5-40 mL IntraVENous PRN    ondansetron (ZOFRAN) injection 4 mg  4 mg IntraVENous Q4H PRN    bisacodyL (DULCOLAX) tablet 5 mg  5 mg Oral DAILY PRN    heparin (porcine) injection 5,000 Units  5,000 Units SubCUTAneous Q8H    acetaminophen (TYLENOL) tablet 650 mg  650 mg Oral Q4H PRN    lactated Ringers infusion  75 mL/hr IntraVENous CONTINUOUS     ______________________________________________________________________  EXPECTED LENGTH OF STAY: - - -  ACTUAL LENGTH OF STAY:          6                 Sona Bhakta MD

## 2020-05-06 NOTE — PROGRESS NOTES
Bedside and Verbal shift change report given to Aman Loya (oncoming nurse) by Primitivo Hall RN (offgoing nurse). Report included the following information SBAR, Kardex, ED Summary, Procedure Summary, Intake/Output, MAR, Recent Results, Cardiac Rhythm NSR and Dual Neuro Assessment.

## 2020-05-06 NOTE — PROGRESS NOTES
ANDREA:  Need for SNF placement, pt medically stable. CM reviewed chart, referral has been made to [facility], phone [phone] and CM spoke with Abdi Sue regarding status of this. Per his conversation with pt, she doesn't have enough income to privately pay for [facility] placement. Alternative will be LTC medicaid application and SNF from hospital if pt is not able to return home. Per Sharonda Bowman, pt's income is approximately $1500 and not the $5000 she may have originally reported. Pt is unable to privately pay for skilled nursing. If pt is unable to return to independent living apartment at [facility], she will need to go to short term SNF and likely apply for LTC medicaid. Will update unit CM.     LADI Bravo

## 2020-05-06 NOTE — PROGRESS NOTES
Transition of Care Plan     · Disposition:Therapy recommends-SNF placement: referral sent to [facility]   · Referred to [facility] for Medicaid   · CM to complete [facility]   · RUR- 17 % Low  Risks   · Transport: [facility] phone [phone] at discharge   · [address]   · Follow up: PCP/Specialist(s)     The Plan for Transition of Care is related to the following treatment goals: SNF     The Patient  was provided with a choice of provider and agrees with the discharge plan. [x] Yes [] No    Freedom of choice list was provided with basic dialogue that supports the patient's individualized plan of care/goals, treatment preferences and shares the quality data associated with the providers. [x] Yes [] No    A Freedom of Choice letter was provided, signed by the patient/family, placed on the bedside chart for record. A referral was sent to [facility] via all scripts. CM advised that the chosen rehab location may not be available sometimes due to various reasons including bed unavailability, and patient may have to go to a different location, verbalized understanding and agreeable.        LADI Sherman

## 2020-05-07 PROCEDURE — 74011250637 HC RX REV CODE- 250/637: Performed by: INTERNAL MEDICINE

## 2020-05-07 PROCEDURE — 65270000029 HC RM PRIVATE

## 2020-05-07 PROCEDURE — 74011250637 HC RX REV CODE- 250/637: Performed by: SPECIALIST

## 2020-05-07 PROCEDURE — 74011250636 HC RX REV CODE- 250/636: Performed by: INTERNAL MEDICINE

## 2020-05-07 PROCEDURE — 97530 THERAPEUTIC ACTIVITIES: CPT

## 2020-05-07 PROCEDURE — 97535 SELF CARE MNGMENT TRAINING: CPT | Performed by: OCCUPATIONAL THERAPIST

## 2020-05-07 PROCEDURE — 97530 THERAPEUTIC ACTIVITIES: CPT | Performed by: OCCUPATIONAL THERAPIST

## 2020-05-07 PROCEDURE — 74011250636 HC RX REV CODE- 250/636: Performed by: FAMILY MEDICINE

## 2020-05-07 PROCEDURE — 74011250637 HC RX REV CODE- 250/637: Performed by: HOSPITALIST

## 2020-05-07 RX ADMIN — HYDRALAZINE HYDROCHLORIDE 50 MG: 50 TABLET, FILM COATED ORAL at 15:27

## 2020-05-07 RX ADMIN — HEPARIN SODIUM 5000 UNITS: 5000 INJECTION INTRAVENOUS; SUBCUTANEOUS at 18:40

## 2020-05-07 RX ADMIN — PANTOPRAZOLE SODIUM 40 MG: 40 TABLET, DELAYED RELEASE ORAL at 07:32

## 2020-05-07 RX ADMIN — DULOXETINE HYDROCHLORIDE 120 MG: 60 CAPSULE, DELAYED RELEASE PELLETS ORAL at 09:09

## 2020-05-07 RX ADMIN — SODIUM CHLORIDE, SODIUM LACTATE, POTASSIUM CHLORIDE, AND CALCIUM CHLORIDE 75 ML/HR: 600; 310; 30; 20 INJECTION, SOLUTION INTRAVENOUS at 21:26

## 2020-05-07 RX ADMIN — DIAZEPAM 2 MG: 2 TABLET ORAL at 15:28

## 2020-05-07 RX ADMIN — CARBIDOPA AND LEVODOPA 2 TABLET: 25; 100 TABLET ORAL at 12:16

## 2020-05-07 RX ADMIN — ASPIRIN 81 MG: 81 TABLET, COATED ORAL at 09:09

## 2020-05-07 RX ADMIN — METOPROLOL SUCCINATE 50 MG: 50 TABLET, EXTENDED RELEASE ORAL at 21:26

## 2020-05-07 RX ADMIN — DIAZEPAM 2 MG: 2 TABLET ORAL at 09:09

## 2020-05-07 RX ADMIN — HYDRALAZINE HYDROCHLORIDE 50 MG: 50 TABLET, FILM COATED ORAL at 21:26

## 2020-05-07 RX ADMIN — HYDRALAZINE HYDROCHLORIDE 50 MG: 50 TABLET, FILM COATED ORAL at 09:09

## 2020-05-07 RX ADMIN — Medication 10 ML: at 13:57

## 2020-05-07 RX ADMIN — CLOPIDOGREL BISULFATE 75 MG: 75 TABLET ORAL at 09:09

## 2020-05-07 RX ADMIN — HEPARIN SODIUM 5000 UNITS: 5000 INJECTION INTRAVENOUS; SUBCUTANEOUS at 04:23

## 2020-05-07 RX ADMIN — CARBIDOPA AND LEVODOPA 2 TABLET: 25; 100 TABLET ORAL at 21:26

## 2020-05-07 RX ADMIN — DIAZEPAM 2 MG: 2 TABLET ORAL at 21:26

## 2020-05-07 RX ADMIN — POLYETHYLENE GLYCOL 3350 17 G: 17 POWDER, FOR SOLUTION ORAL at 09:08

## 2020-05-07 RX ADMIN — CARBIDOPA AND LEVODOPA 2 TABLET: 25; 100 TABLET ORAL at 18:40

## 2020-05-07 RX ADMIN — CARBIDOPA AND LEVODOPA 2 TABLET: 25; 100 TABLET ORAL at 09:09

## 2020-05-07 RX ADMIN — ARIPIPRAZOLE 15 MG: 10 TABLET ORAL at 10:26

## 2020-05-07 RX ADMIN — HEPARIN SODIUM 5000 UNITS: 5000 INJECTION INTRAVENOUS; SUBCUTANEOUS at 10:29

## 2020-05-07 RX ADMIN — SENNOSIDES AND DOCUSATE SODIUM 1 TABLET: 8.6; 5 TABLET ORAL at 09:09

## 2020-05-07 RX ADMIN — ATORVASTATIN CALCIUM 40 MG: 40 TABLET, FILM COATED ORAL at 21:26

## 2020-05-07 RX ADMIN — VITAM B12 100 MCG: 100 TAB at 09:09

## 2020-05-07 NOTE — PROGRESS NOTES
[address] doesn't have bed until early next week. Patient is medically stable for discharge. CM to continue explore other facilities in the area.        LADI Hopper

## 2020-05-07 NOTE — PROGRESS NOTES
Problem: Mobility Impaired (Adult and Pediatric)  Goal: *Acute Goals and Plan of Care (Insert Text)  Description: FUNCTIONAL STATUS PRIOR TO ADMISSION: Patient was modified independent using a rollator for functional mobility. Pt reports increasing difficulty with ambulation secondary to pain and fatigue - maximal effort to ambulate. HOME SUPPORT PRIOR TO ADMISSION: The patient lived in [place] [address] - plans to discharge to assisted living. Physical Therapy Goals  Initiated 5/4/2020  1. Patient will move from supine to sit and sit to supine , scoot up and down and roll side to side in bed with minimal assistance/contact guard assist within 7 day(s). 2.  Patient will transfer from bed to chair and chair to bed with minimal assistance/contact guard assist using the least restrictive device within 7 day(s). 3.  Patient will perform sit to stand with supervision/set-up within 7 day(s). 4.  Patient will ambulate with minimal assistance/contact guard assist for 20 feet with the least restrictive device within 7 day(s). Outcome: Progressing Towards Goal   PHYSICAL THERAPY TREATMENT  Patient: Dionte Lucio (44 y.o. female)  Date: 5/7/2020  Diagnosis: Generalized weakness [R53.1]  Weakness [R53.1]   Generalized weakness       Precautions:    Chart, physical therapy assessment, plan of care and goals were reviewed. ASSESSMENT  Patient continues with skilled PT services and is progressing towards goals. She was agreeable to getting up and OOB this morning and needed min to mod assist for bed mobility and transfer. Her gait is very shuffled and she tends toward posterior lean with the walker, but she denied any dizziness or lightheadedness or pain with activity. Her mobility and gait is below her baseline, since she was living in an independent apartment in an assisted living prior to admission. Continue to recommend SNF level rehab once medically ready.      Current Level of Function Impacting Discharge (mobility/balance): min to mod assist for bed mobility and transfers with the walker    Other factors to consider for discharge: modified independent prior to admission         PLAN :  Patient continues to benefit from skilled intervention to address the above impairments. Continue treatment per established plan of care. to address goals. Recommendation for discharge: (in order for the patient to meet his/her long term goals)  Therapy up to 5 days/week in SNF setting    This discharge recommendation:  Has been made in collaboration with the attending provider and/or case management    IF patient discharges home will need the following DME: patient owns DME required for discharge       SUBJECTIVE:   Patient stated I guess I can get up.     OBJECTIVE DATA SUMMARY:   Critical Behavior:  Neurologic State: Alert  Orientation Level: Oriented X4  Cognition: Appropriate decision making, Appropriate for age attention/concentration, Appropriate safety awareness, Follows commands  Safety/Judgement: Awareness of environment, Decreased awareness of need for assistance, Decreased awareness of need for safety, Decreased insight into deficits  Functional Mobility Training:  Bed Mobility:     Supine to Sit: Moderate assistance; Additional time  Sit to Supine: (up in chair after session)           Transfers:  Sit to Stand: Moderate assistance; Adaptive equipment; Additional time  Stand to Sit: Minimum assistance; Adaptive equipment; Additional time        Bed to Chair: Moderate assistance; Adaptive equipment; Additional time                    Balance:  Sitting: Intact; Without support  Sitting - Static: Good (unsupported)  Sitting - Dynamic: Good (unsupported)  Standing: Impaired; With support  Standing - Static: Constant support; Fair  Standing - Dynamic : Constant support;Fair(poor protective step, shuffles)  Ambulation/Gait Training:  Distance (ft): 3 Feet (ft)  Assistive Device: Gait belt;Walker, rolling  Ambulation - Level of Assistance: Minimal assistance; Adaptive equipment; Additional time        Gait Abnormalities: Decreased step clearance;Shuffling gait(festinating tendency)        Base of Support: Widened     Speed/Samira: Pace decreased (<100 feet/min); Shuffled  Step Length: Left shortened;Right shortened                    Stairs: Therapeutic Exercises:     Pain Rating:  She reports some pain in her buttocks in supine, but no complaints with activity    Activity Tolerance:   Fair and she is somewhat self-limiting   Please refer to the flowsheet for vital signs taken during this treatment. After treatment patient left in no apparent distress:   Sitting in chair and Call bell within reach    COMMUNICATION/COLLABORATION:   The patients plan of care was discussed with: Registered nurse.      Alyson Merrill, PT   Time Calculation: 20 mins

## 2020-05-07 NOTE — PROGRESS NOTES
Problem: Self Care Deficits Care Plan (Adult)  Goal: *Acute Goals and Plan of Care (Insert Text)  Description:   FUNCTIONAL STATUS PRIOR TO ADMISSION: Patient was modified independent using a rolling walker for functional mobility. Patient Mod I for basic and instrumental ADLs (using ILF transport services). HOME SUPPORT: The patient lived alone at [address] [place] [address], utilized ILF transport services for groceries. Occupational Therapy Goals  Initiated 5/4/2020  1. Patient will perform self-feeding with independence within 7 day(s). 2.  Patient will perform grooming in unsupported sitting with supervision/set-up within 7 day(s). 3.  Patient will perform lower body dressing with moderate assistance within 7 day(s). 4.  Patient will perform toilet transfers with moderate assistance x1 within 7 day(s). 5.  Patient will perform all aspects of toileting with moderate assistance  within 7 day(s). 6.  Patient will participate in upper extremity therapeutic exercise/activities with supervision/set-up for 5 minutes within 7 day(s). 7.  Patient will utilize energy conservation techniques during functional activities with verbal cues within 7 day(s). Outcome: Progressing Towards Goal    OCCUPATIONAL THERAPY TREATMENT  Patient: Natalya Cox (88 y.o. female)  Date: 5/7/2020  Diagnosis: Generalized weakness [R53.1]  Weakness [R53.1]   Generalized weakness       Precautions:    Chart, occupational therapy assessment, plan of care, and goals were reviewed. ASSESSMENT  Patient continues with skilled OT services and is progressing towards goals. Completed stand pivot with min to mod of 1 to return to bed, slow moving and increased time for all tasks. Max to total assist to roll due to rigidity in LE's.       Current Level of Function Impacting Discharge (ADLs): total assist toileting, LE ADL's    Other factors to consider for discharge: recommend SNF         PLAN :  Patient continues to benefit from skilled intervention to address the above impairments. Continue treatment per established plan of care. to address goals. Recommendation for discharge: (in order for the patient to meet his/her long term goals)  Therapy up to 5 days/week in SNF setting    This discharge recommendation:  Has been made in collaboration with the attending provider and/or case management    IF patient discharges home will need the following DME: none       SUBJECTIVE:   Patient stated that feels better.   stated upon returning to bed    OBJECTIVE DATA SUMMARY:   Cognitive/Behavioral Status:  Neurologic State: Alert  Orientation Level: Oriented X4  Cognition: Appropriate decision making  Perception: Appears intact  Perseveration: No perseveration noted       Functional Mobility and Transfers for ADLs:  Bed Mobility:  Supine to Sit: Moderate assistance; Additional time  Sit to Supine: (up in chair after session)  Scooting: Moderate assistance    Transfers:  Sit to Stand: Moderate assistance; Adaptive equipment; Additional time     Bed to Chair: Minimum assistance; Moderate assistance;Assist x1(stand pivot to left with bedrail for support)    Balance:  Sitting: Intact; Without support  Sitting - Static: Good (unsupported)  Sitting - Dynamic: Good (unsupported)  Standing: Impaired; With support  Standing - Static: Constant support; Fair  Standing - Dynamic : Constant support;Fair(poor protective step, shuffles)    ADL Intervention:      Patient instructed and indicated understanding the benefits of maintaining activity tolerance, functional mobility, and independence with self care tasks during acute stay  to ensure safe return home and to baseline. Encouraged patient to increase frequency and duration OOB, be out of bed for all meals, perform daily ADLs (as approved by RN/MD regarding bathing etc), and performing functional mobility to/from bedside commode      Lower Body Dressing Assistance  Dressing Assistance:  Total assistance(dependent)  Protective Undergarmet: Total assistance (dependent)    Toileting  Toileting Assistance: Total assistance(dependent)  Bladder Hygiene: Set-up(in supine, LE's in bridge)  Clothing Management: Total assistance (dependent)       Pain:  No complaint    Activity Tolerance:   Fair  Please refer to the flowsheet for vital signs taken during this treatment. After treatment patient left in no apparent distress:   Supine in bed, Call bell within reach, and Side rails x 3    COMMUNICATION/COLLABORATION:   The patients plan of care was discussed with: Registered nurse.      Ree Hernandez OTR/L  Time Calculation: 24 mins

## 2020-05-07 NOTE — PROGRESS NOTES
[facility] Adult  Hospitalist Group                                                                                          Hospitalist Progress Note  Coretta Saez MD  Answering service: [phone] or [phone] from in house phone        Date of Service:  2020  NAME:  Trina Martinez  :  1940  MRN:  [id number]      Admission Summary:     Patient was in her usual state of health until a couple of weeks ago when the patient developed weakness.  The patient described the weakness as generalized weakness, progressive and getting worse.  The patient is unable to walk.  The patient gets meal delivery by [employer] once a day.  The patient stated that she has no appetite and she has not been drinking. Brandon Trejo also complained of cough, which is nonproductive, not associated with fever, rigors, or chills.  As a result of the weakness, the patient fell at home a couple of times.  The last fall was about 2 weeks ago.  Complaining of generalized body aches and pain as well as discomfort in her neck.  The patient was last admitted to this hospital from 2019 to 2019.  The patient was admitted and treated for acute CVA.  The patient has left carotid artery stenosis, underwent endarterectomy in the past.  According to the patient, the left carotid artery is now blocked again and the patient is awaiting revascularization of the left carotid artery.  The procedure was postponed because of the COVID-19 pandemic.  When the patient arrived at the emergency room, chest x-ray was obtained.  The chest x-ray was without any acute findings.  The patient was not able to ambulate, was referred to the hospitalist service for evaluation for admission. Interval history / Subjective: Follow up Generalized weakness  Patient seen and examined by the bedside, Labs, images and notes reviewed  Awake,sitting up on chair, Denies any complaints  Discussed with nursing staff, orders reviewed.    Plan discussed with patient/Family       Assessment & Plan:     Generalized weakness  -Status post mechanical fall from weakness  -Secondary to progression of Parkinson  -Initial head CT without acute abnormality, MRI of the brain negative for CVA, MRI of the C-spine shows spondylosis with mild spinal stenosis at the remaining to mild cord compression most pronounced at C4-5. No significant change since 7/2019  -PT following the patient. Patient now agreeable to SNF    Parkinson's disease  -continue Sinemet    Coronary artery disease  -With elevated troponin upon admission with EKG changes  -Cardiology evaluating the patient  -Stress test shows negative for ischemia    Acute kidney injury  -Patient has baseline CKD  -Improving with IV fluid, at baseline now    Hypertension  -Blood pressure improving, continue monitor    Atrial fibrillation  -Paroxysmal atrial fibrillation  -Currently in sinus rhythm  -Patient is not a candidate for anticoagulation per cardiology    Carotic artery stenosis  -Plan for outpatient surgery    Dyslipidemia  -Statin    Anxiety and depression  -Stable    Anemia  -Anemia of chronic kidney disease  -H&H stable    Code status: FULL  DVT prophylaxis: Heparin    Care Plan discussed with: Patient/Family and Nurse  Anticipated Disposition: SNF/LTC  Anticipated Discharge: 24 - 48 hr     Hospital Problems  Date Reviewed: 5/1/2020          Codes Class Noted POA    Weakness ICD-10-CM: R53.1  ICD-9-CM: 780.79  5/4/2020 Unknown        * (Principal) Generalized weakness ICD-10-CM: R53.1  ICD-9-CM: 780.79  4/30/2020 Yes                Review of Systems:   A comprehensive review of systems was negative except for that written in the HPI. Vital Signs:    Last 24hrs VS reviewed since prior progress note.  Most recent are:  Visit Vitals  /70 (BP 1 Location: Right arm, BP Patient Position: At rest)   Pulse 60   Temp 99 °F (37.2 °C)   Resp 20   Ht 5' 5\" (1.651 m)   Wt 79.6 kg (175 lb 7.8 oz)   SpO2 96% BMI 29.20 kg/m²         Intake/Output Summary (Last 24 hours) at 5/7/2020 1643  Last data filed at 5/7/2020 1501  Gross per 24 hour   Intake 1343 ml   Output 2800 ml   Net -1457 ml        Physical Examination:             Constitutional:  No acute distress, cooperative, pleasant    ENT:  Oral mucosa moist, oropharynx benign. Resp:  CTA bilaterally. No wheezing/rhonchi/rales. No accessory muscle use   CV:  Regular rhythm, normal rate, no murmurs, gallops, rubs    GI:  Soft, non distended, non tender. normoactive bowel sounds, no hepatosplenomegaly     Musculoskeletal:  No edema, warm, 2+ pulses throughout    Neurologic:  Moves all extremities. AAOx3, CN II-XII reviewed     Skin:  Good turgor, no rashes or ulcers       Data Review:    Review and/or order of clinical lab test      Labs:     No results for input(s): WBC, HGB, HCT, PLT, HGBEXT, HCTEXT, PLTEXT, HGBEXT, HCTEXT, PLTEXT in the last 72 hours. No results for input(s): NA, K, CL, CO2, BUN, CREA, GLU, CA, MG, PHOS, URICA in the last 72 hours. No results for input(s): SGOT, GPT, ALT, AP, TBIL, TBILI, TP, ALB, GLOB, GGT, AML, LPSE in the last 72 hours. No lab exists for component: AMYP, HLPSE  No results for input(s): INR, PTP, APTT, INREXT, INREXT in the last 72 hours. No results for input(s): FE, TIBC, PSAT, FERR in the last 72 hours. No results found for: FOL, RBCF   No results for input(s): PH, PCO2, PO2 in the last 72 hours. No results for input(s): CPK, CKNDX, TROIQ in the last 72 hours.     No lab exists for component: CPKMB  Lab Results   Component Value Date/Time    Cholesterol, total 129 05/01/2020 06:37 AM    HDL Cholesterol 36 05/01/2020 06:37 AM    LDL, calculated 74.6 05/01/2020 06:37 AM    Triglyceride 92 05/01/2020 06:37 AM    CHOL/HDL Ratio 3.6 05/01/2020 06:37 AM     Lab Results   Component Value Date/Time    Glucose (POC) 139 (H) 07/26/2019 04:34 PM    Glucose (POC) 126 (H) 07/26/2019 11:01 AM    Glucose (POC) 94 07/12/2019 05:40 PM    Glucose (POC) 135 (H) 02/17/2015 06:46 AM    Glucose (POC) 129 (H) 02/16/2015 09:58 PM     Lab Results   Component Value Date/Time    Color DARK YELLOW 04/30/2020 07:57 PM    Appearance CLEAR 04/30/2020 07:57 PM    Specific gravity 1.025 04/30/2020 07:57 PM    pH (UA) 5.0 04/30/2020 07:57 PM    Protein 100 (A) 04/30/2020 07:57 PM    Glucose Negative 04/30/2020 07:57 PM    Ketone 15 (A) 04/30/2020 07:57 PM    Bilirubin NEGATIVE  07/11/2019 10:17 AM    Urobilinogen 1.0 04/30/2020 07:57 PM    Nitrites Negative 04/30/2020 07:57 PM    Leukocyte Esterase Negative 04/30/2020 07:57 PM    Epithelial cells FEW 04/30/2020 07:57 PM    Bacteria Negative 04/30/2020 07:57 PM    WBC 0-4 04/30/2020 07:57 PM    RBC 0-5 04/30/2020 07:57 PM         Medications Reviewed:     Current Facility-Administered Medications   Medication Dose Route Frequency    hydrALAZINE (APRESOLINE) tablet 50 mg  50 mg Oral TID    metoprolol succinate (TOPROL-XL) XL tablet 50 mg  50 mg Oral QHS    senna-docusate (PERICOLACE) 8.6-50 mg per tablet 1 Tab  1 Tab Oral DAILY    polyethylene glycol (MIRALAX) packet 17 g  17 g Oral DAILY    ARIPiprazole (ABILIFY) tablet 15 mg  15 mg Oral DAILY    aspirin delayed-release tablet 81 mg  81 mg Oral DAILY    atorvastatin (LIPITOR) tablet 40 mg  40 mg Oral QHS    carbidopa-levodopa (SINEMET)  mg per tablet 2 Tab  2 Tab Oral QID    clopidogreL (PLAVIX) tablet 75 mg  75 mg Oral DAILY AFTER BREAKFAST    cyanocobalamin (VITAMIN B12) tablet 100 mcg  100 mcg Oral DAILY    diazePAM (VALIUM) tablet 2 mg  2 mg Oral TID    DULoxetine (CYMBALTA) capsule 120 mg  120 mg Oral DAILY    pantoprazole (PROTONIX) tablet 40 mg  40 mg Oral ACB    nitroglycerin (NITROSTAT) tablet 0.4 mg  0.4 mg SubLINGual Q5MIN PRN    sodium chloride (NS) flush 5-40 mL  5-40 mL IntraVENous Q8H    sodium chloride (NS) flush 5-40 mL  5-40 mL IntraVENous PRN    ondansetron (ZOFRAN) injection 4 mg  4 mg IntraVENous Q4H PRN    bisacodyL (DULCOLAX) tablet 5 mg  5 mg Oral DAILY PRN    heparin (porcine) injection 5,000 Units  5,000 Units SubCUTAneous Q8H    acetaminophen (TYLENOL) tablet 650 mg  650 mg Oral Q4H PRN    lactated Ringers infusion  75 mL/hr IntraVENous CONTINUOUS     ______________________________________________________________________  EXPECTED LENGTH OF STAY: 3d 21h  ACTUAL LENGTH OF STAY:          7                 Steph Enamorado MD

## 2020-05-07 NOTE — PROGRESS NOTES
TRANSFER - OUT REPORT:    Verbal report given to Thaddeus Anne RN(name) on [employer]  being transferred to [address] [place] [address](unit) for routine progression of care       Report consisted of patients Situation, Background, Assessment and   Recommendations(SBAR). Information from the following report(s) SBAR, Kardex, Intake/Output, MAR, Cardiac Rhythm NSR and Quality Measures was reviewed with the receiving nurse. Lines:   Peripheral IV 04/30/20 Left Forearm (Active)   Site Assessment Clean, dry, & intact 5/7/2020 12:00 AM   Phlebitis Assessment 0 5/7/2020 12:00 AM   Infiltration Assessment 0 5/7/2020 12:00 AM   Dressing Status Clean, dry, & intact 5/7/2020 12:00 AM   Dressing Type Transparent 5/7/2020 12:00 AM   Hub Color/Line Status Green; Infusing 5/7/2020 12:00 AM   Action Taken Open ports on tubing capped 5/7/2020 12:00 AM   Alcohol Cap Used Yes 5/7/2020 12:00 AM        Opportunity for questions and clarification was provided.       Patient transported with:   Registered Nurse

## 2020-05-07 NOTE — PROGRESS NOTES
TRANSFER - IN REPORT:    Verbal report received from Abbey (name) on Priscila Pipe  being received from [facility] (unit) for routine progression of care      Report consisted of patients Situation, Background, Assessment and   Recommendations(SBAR). Information from the following report(s) SBAR, Kardex, Intake/Output, MAR and Recent Results was reviewed with the receiving nurse. Opportunity for questions and clarification was provided. Assessment completed upon patients arrival to unit and care assumed.

## 2020-05-07 NOTE — PROGRESS NOTES
Problem: Falls - Risk of  Goal: *Absence of Falls  Description: Document Alia Carey Fall Risk and appropriate interventions in the flowsheet. Outcome: Progressing Towards Goal  Note: Fall Risk Interventions:  Mobility Interventions: Bed/chair exit alarm         Medication Interventions: Assess postural VS orthostatic hypotension    Elimination Interventions: Bed/chair exit alarm    History of Falls Interventions: Bed/chair exit alarm         Problem: Patient Education: Go to Patient Education Activity  Goal: Patient/Family Education  Outcome: Progressing Towards Goal     Problem: Pressure Injury - Risk of  Goal: *Prevention of pressure injury  Description: Document Gregorio Scale and appropriate interventions in the flowsheet.   Outcome: Progressing Towards Goal  Note: Pressure Injury Interventions:  Sensory Interventions: Assess changes in LOC, Assess need for specialty bed    Moisture Interventions: Absorbent underpads, Apply protective barrier, creams and emollients, Assess need for specialty bed    Activity Interventions: Assess need for specialty bed    Mobility Interventions: Assess need for specialty bed    Nutrition Interventions: Document food/fluid/supplement intake    Friction and Shear Interventions: Lift sheet                Problem: Patient Education: Go to Patient Education Activity  Goal: Patient/Family Education  Outcome: Progressing Towards Goal     Problem: General Medical Care Plan  Goal: *Vital signs within specified parameters  Outcome: Progressing Towards Goal  Goal: *Labs within defined limits  Outcome: Progressing Towards Goal  Goal: *Absence of infection signs and symptoms  Outcome: Progressing Towards Goal  Goal: *Optimal pain control at patient's stated goal  Outcome: Progressing Towards Goal  Goal: *Skin integrity maintained  Outcome: Progressing Towards Goal  Goal: *Fluid volume balance  Outcome: Progressing Towards Goal  Goal: *Optimize nutritional status  Outcome: Progressing Towards Goal  Goal: *Anxiety reduced or absent  Outcome: Progressing Towards Goal  Goal: *Progressive mobility and function (eg: ADL's)  Outcome: Progressing Towards Goal     Problem: Patient Education: Go to Patient Education Activity  Goal: Patient/Family Education  Outcome: Progressing Towards Goal     Problem: Discharge Planning  Goal: *Discharge to safe environment  Description: See cm note.     [facility]     Outcome: Progressing Towards Goal     Problem: Pain  Goal: *Control of Pain  Outcome: Progressing Towards Goal  Goal: *PALLIATIVE CARE:  Alleviation of Pain  Outcome: Progressing Towards Goal     Problem: Patient Education: Go to Patient Education Activity  Goal: Patient/Family Education  Outcome: Progressing Towards Goal     Problem: Patient Education: Go to Patient Education Activity  Goal: Patient/Family Education  Outcome: Progressing Towards Goal     Problem: Patient Education: Go to Patient Education Activity  Goal: Patient/Family Education  Outcome: Progressing Towards Goal     Problem: Pain - Acute  Goal: *Control of acute pain  Outcome: Progressing Towards Goal     Problem: Patient Education: Go to Patient Education Activity  Goal: Patient/Family Education  Outcome: Progressing Towards Goal

## 2020-05-07 NOTE — PROGRESS NOTES
ANDREA: SNF placement pending. Referrals are pending with [facility] [place]. A UAI has been submitted for processing. Chart reviewed. Noted patient transfer to . CM noted patient has been denied by [facility] and [facility] has no beds until next week. CM provided patient with SNF choice list. Patient would like time to review list. CM will follow-up. CM met with patient to obtain SNF choice. She would like referrals sent to [facility] and [facility]. FOC placed on hard chart.     Guanaco Lindsey, BSW/CRM

## 2020-05-07 NOTE — PROGRESS NOTES
Bedside shift change report given to Oly Sanchez (oncoming nurse) by Alize Cadet (offgoing nurse). Report included the following information SBAR, Kardex, Intake/Output and MAR.

## 2020-05-07 NOTE — WOUND CARE
Wound Consult: Follow Up Visit. Chart reviewed. Consulted for sacral injury POA. Spoke with patients nurse,  Nael Mckeon to hand off on visit. Patient is resting on a total care bed. She turns with minimal assist to side for assessment. Assessment: 
Sacrum - 1 x 0.5 x 0.1 cm, bright pink base, pink edges, pink/blanching intact skin surrounding; Stage 2 pressure injury POA. No redness to buttocks, back, hips or heels. Bruising on right lateral foot / ankle area fading. Petechial appearance of feet also lessened. Treatment: 
Small 2 x 2 hydrocolloid applied to sacral injury; heels floated; repositioned for breakfast. 
Wound Recommendations: 
Wound Care: Begin small hydrocolloid to sacral injury; as needed; at least weekly; more frequently if soiled or coming off. Skin Care Recommendations: 1. Minimize friction/shear: minimize layers of linen/pads under patient. 2. Off load pressure/reposition: continue to turn and reposition approximately every 2 hours; float heels; waffle cushion for sitting. 3. Manage Moisture - keep skin folds dry; incontinence skin care as needed; appropriate sized briefs if needed; Pure wick in use. 4. Continue to monitor nutrition, pain, and skin risk scale, and skin assessment. Plan: Will hand off to MD to update orders. We will continue to reassess routinely and as needed. Rosalia Schneider RN,[facility] Wound [facility] Office [phone] Pager [phone]

## 2020-05-07 NOTE — PROGRESS NOTES
Bedside shift change report given to Mariela Clemente (oncoming nurse) by Loren Billings (offgoing nurse). Report included the following information SBAR, Kardex, Intake/Output, MAR and Recent Results.

## 2020-05-07 NOTE — PROGRESS NOTES
Primary Nurse Brayan Barnard and [facility] performed a dual skin assessment on this patient Impairment noted- see wound doc flow sheet  Gregorio score is 15. Wound on sacrum.  Barrier cream applied

## 2020-05-08 LAB
ANION GAP SERPL CALC-SCNC: 4 MMOL/L (ref 5–15)
BUN SERPL-MCNC: 23 MG/DL (ref 6–20)
BUN/CREAT SERPL: 19 (ref 12–20)
CALCIUM SERPL-MCNC: 9.9 MG/DL (ref 8.5–10.1)
CHLORIDE SERPL-SCNC: 109 MMOL/L (ref 97–108)
CO2 SERPL-SCNC: 27 MMOL/L (ref 21–32)
CREAT SERPL-MCNC: 1.19 MG/DL (ref 0.55–1.02)
GLUCOSE SERPL-MCNC: 123 MG/DL (ref 65–100)
POTASSIUM SERPL-SCNC: 4.1 MMOL/L (ref 3.5–5.1)
SODIUM SERPL-SCNC: 140 MMOL/L (ref 136–145)

## 2020-05-08 PROCEDURE — 80048 BASIC METABOLIC PNL TOTAL CA: CPT

## 2020-05-08 PROCEDURE — 97535 SELF CARE MNGMENT TRAINING: CPT

## 2020-05-08 PROCEDURE — 74011250636 HC RX REV CODE- 250/636: Performed by: INTERNAL MEDICINE

## 2020-05-08 PROCEDURE — 74011250636 HC RX REV CODE- 250/636: Performed by: FAMILY MEDICINE

## 2020-05-08 PROCEDURE — 74011250637 HC RX REV CODE- 250/637: Performed by: HOSPITALIST

## 2020-05-08 PROCEDURE — 74011250637 HC RX REV CODE- 250/637: Performed by: INTERNAL MEDICINE

## 2020-05-08 PROCEDURE — 77030038269 HC DRN EXT URIN PURWCK BARD -A

## 2020-05-08 PROCEDURE — 74011250637 HC RX REV CODE- 250/637: Performed by: SPECIALIST

## 2020-05-08 PROCEDURE — 97530 THERAPEUTIC ACTIVITIES: CPT

## 2020-05-08 PROCEDURE — 36415 COLL VENOUS BLD VENIPUNCTURE: CPT

## 2020-05-08 PROCEDURE — 65270000029 HC RM PRIVATE

## 2020-05-08 RX ADMIN — POLYETHYLENE GLYCOL 3350 17 G: 17 POWDER, FOR SOLUTION ORAL at 09:12

## 2020-05-08 RX ADMIN — PANTOPRAZOLE SODIUM 40 MG: 40 TABLET, DELAYED RELEASE ORAL at 07:23

## 2020-05-08 RX ADMIN — CARBIDOPA AND LEVODOPA 2 TABLET: 25; 100 TABLET ORAL at 09:12

## 2020-05-08 RX ADMIN — SODIUM CHLORIDE, SODIUM LACTATE, POTASSIUM CHLORIDE, AND CALCIUM CHLORIDE 75 ML/HR: 600; 310; 30; 20 INJECTION, SOLUTION INTRAVENOUS at 23:11

## 2020-05-08 RX ADMIN — HEPARIN SODIUM 5000 UNITS: 5000 INJECTION INTRAVENOUS; SUBCUTANEOUS at 10:52

## 2020-05-08 RX ADMIN — CARBIDOPA AND LEVODOPA 2 TABLET: 25; 100 TABLET ORAL at 13:48

## 2020-05-08 RX ADMIN — HYDRALAZINE HYDROCHLORIDE 50 MG: 50 TABLET, FILM COATED ORAL at 15:18

## 2020-05-08 RX ADMIN — HYDRALAZINE HYDROCHLORIDE 50 MG: 50 TABLET, FILM COATED ORAL at 21:49

## 2020-05-08 RX ADMIN — DIAZEPAM 2 MG: 2 TABLET ORAL at 09:13

## 2020-05-08 RX ADMIN — DULOXETINE HYDROCHLORIDE 120 MG: 60 CAPSULE, DELAYED RELEASE PELLETS ORAL at 09:12

## 2020-05-08 RX ADMIN — CLOPIDOGREL BISULFATE 75 MG: 75 TABLET ORAL at 09:16

## 2020-05-08 RX ADMIN — ATORVASTATIN CALCIUM 40 MG: 40 TABLET, FILM COATED ORAL at 21:19

## 2020-05-08 RX ADMIN — CARBIDOPA AND LEVODOPA 2 TABLET: 25; 100 TABLET ORAL at 21:20

## 2020-05-08 RX ADMIN — CARBIDOPA AND LEVODOPA 2 TABLET: 25; 100 TABLET ORAL at 18:33

## 2020-05-08 RX ADMIN — Medication 10 ML: at 13:49

## 2020-05-08 RX ADMIN — HEPARIN SODIUM 5000 UNITS: 5000 INJECTION INTRAVENOUS; SUBCUTANEOUS at 03:29

## 2020-05-08 RX ADMIN — HYDRALAZINE HYDROCHLORIDE 50 MG: 50 TABLET, FILM COATED ORAL at 09:17

## 2020-05-08 RX ADMIN — DIAZEPAM 2 MG: 2 TABLET ORAL at 15:18

## 2020-05-08 RX ADMIN — VITAM B12 100 MCG: 100 TAB at 09:16

## 2020-05-08 RX ADMIN — SENNOSIDES AND DOCUSATE SODIUM 1 TABLET: 8.6; 5 TABLET ORAL at 09:13

## 2020-05-08 RX ADMIN — HEPARIN SODIUM 5000 UNITS: 5000 INJECTION INTRAVENOUS; SUBCUTANEOUS at 18:33

## 2020-05-08 RX ADMIN — ASPIRIN 81 MG: 81 TABLET, COATED ORAL at 09:13

## 2020-05-08 RX ADMIN — DIAZEPAM 2 MG: 2 TABLET ORAL at 21:25

## 2020-05-08 RX ADMIN — ARIPIPRAZOLE 15 MG: 10 TABLET ORAL at 09:17

## 2020-05-08 NOTE — PROGRESS NOTES
ANDREA: Patient lives in independent living at [facility]. CM working on SNF placement, however having difficulty securing a bed. Referrals pending with Oksana Pierre of [facility] and [address] [facility]. Velia Hayes has been approved with [facility]. The [facility] is good until Monday. A UAI has been completed. Copy of UAI located on hard chart. Patient will need transportation arranged at discharge (She does not want ambulance due to cost). Patient is concerned that she does not ahve clothes to wear at the facility. Patient stated she has no family or friends to assist with this. Chart reviewed. Noted [facility] no beds available. CM called [facility] #[phone] and left message for admissions to follow-up on referral. Awaiting response. 2:25 PM: CM called Bob again, admissions is in a meeting. CM will call back. 3:30 PM: CM called [facility] #[phone] and spoke with Cat Gilmore. They do not have any beds at this time. So far, [address] [place] [address] and [facility] have denied referral as they have no beds available. 4:00 PM: CM met with patient to obtain additional SNF choices. She would like referrals sent to [facility], [address] of [facility] and [address]. CM sent referrals. CM called [facility] #[phone] in case patient is ready for discharge over the weekend. ELIZABETH spoke with Deandra Cruz with [facility] who stated that we still have to go through the [facility] process. CM faxed clinicals to [phone]. Reference number for [facility] is #[id number]. The reference number is not the authorization number. [facility] will need to be notified of which facility the patient will be going to.     3:55 PM CM received call from [facility], [facility] has been approved, [facility] #[id number]. [facility] is approved through Monday. If patient is still here Monday, a new auth will need to be initiated. UAI has been successfully processed.  ELIZABETH placed copy of UAI on hard chart.       MANDY NajeraW/CRM

## 2020-05-08 NOTE — PROGRESS NOTES
Bedside shift change report given to Emanuel Mcleod (oncoming nurse) by Cynthia Garcia (offgoing nurse). Report included the following information SBAR, Kardex, Intake/Output, MAR and Recent Results.

## 2020-05-08 NOTE — PROGRESS NOTES
Problem: Mobility Impaired (Adult and Pediatric)  Goal: *Acute Goals and Plan of Care (Insert Text)  Description: FUNCTIONAL STATUS PRIOR TO ADMISSION: Patient was modified independent using a rollator for functional mobility. Pt reports increasing difficulty with ambulation secondary to pain and fatigue - maximal effort to ambulate. HOME SUPPORT PRIOR TO ADMISSION: The patient lived in [place] [address] - plans to discharge to assisted living. Physical Therapy Goals  Initiated 5/4/2020  1. Patient will move from supine to sit and sit to supine , scoot up and down and roll side to side in bed with minimal assistance/contact guard assist within 7 day(s). 2.  Patient will transfer from bed to chair and chair to bed with minimal assistance/contact guard assist using the least restrictive device within 7 day(s). 3.  Patient will perform sit to stand with supervision/set-up within 7 day(s). 4.  Patient will ambulate with minimal assistance/contact guard assist for 20 feet with the least restrictive device within 7 day(s). Outcome: Progressing Towards Goal  PHYSICAL THERAPY TREATMENT  Patient: Norberto Deleon (57 y.o. female)  Date: 5/8/2020  Diagnosis: Generalized weakness [R53.1]  Weakness [R53.1]   Generalized weakness       Precautions:    Chart, physical therapy assessment, plan of care and goals were reviewed. ASSESSMENT  Patient continues with skilled PT services and is progressing towards goals gradually. Continues to present w/ general weakness and decreased strength. She declined transfer to chair and gait training after brief change while standing at bedside. She was able to come to EOB w/ increased time and Min-Mod A for shoulders/trunk off mattress. She stood x2 (Mod A improving to Min A) and remained standing for for change in brief. She demonstrated flexed posture and knees mildly flexed however no buckling observed. She sidestepped to [facility] w/ Min A for RW management.  She did c/o mild knee pain. Current Level of Function Impacting Discharge (mobility/balance): Min-Mod Ax1 for functional transfers     Other factors to consider for discharge: mobility below baseline         PLAN :  Patient continues to benefit from skilled intervention to address the above impairments. Continue treatment per established plan of care. to address goals. Recommendation for discharge: (in order for the patient to meet his/her long term goals)  Therapy up to 5 days/week in SNF setting    This discharge recommendation:  Has been made in collaboration with the attending provider and/or case management    IF patient discharges home will need the following DME: to be determined (TBD)       SUBJECTIVE:   Patient stated I sat in the chair for half the day yesterday and my but got sore.     OBJECTIVE DATA SUMMARY:   Critical Behavior:  Neurologic State: Alert  Orientation Level: Oriented X4  Cognition: Appropriate for age attention/concentration  Safety/Judgement: Good awareness of safety precautions  Functional Mobility Training:  Bed Mobility:     Supine to Sit: Minimum assistance; Moderate assistance; Additional time;Assist x1;Bed Modified(HOB raised )  Sit to Supine: Moderate assistance(LE's into bed)  Scooting: Maximum assistance(hips to EOB)        Transfers:  Sit to Stand: Moderate assistance;Assist x1  Stand to Sit: Minimum assistance; Adaptive equipment;Assist x1                             Balance:  Sitting: Intact  Sitting - Static: Good (unsupported)  Sitting - Dynamic: Good (unsupported)  Standing: Impaired; With support  Standing - Static: Constant support; Fair  Standing - Dynamic : Constant support; Fair  Ambulation/Gait Training:         Side stepped to [facility] x2 FT w/ RW + Min A (RW management      Pain Rating:  C/o mild pain , left buttock      Activity Tolerance:   Fair  Please refer to the flowsheet for vital signs taken during this treatment.     After treatment patient left in no apparent distress:   Supine in bed    COMMUNICATION/COLLABORATION:   The patients plan of care was discussed with: Registered nurse.      Frances HENRY Means,PTA   Time Calculation: 35 mins

## 2020-05-08 NOTE — PROGRESS NOTES
Problem: Self Care Deficits Care Plan (Adult)  Goal: *Acute Goals and Plan of Care (Insert Text)  Description:   FUNCTIONAL STATUS PRIOR TO ADMISSION: Patient was modified independent using a rolling walker for functional mobility. Patient Mod I for basic and instrumental ADLs (using ILF transport services). HOME SUPPORT: The patient lived alone at [address] [place] [address], utilized [facility] transport services for groceries. Occupational Therapy Goals  Initiated 5/4/2020  1. Patient will perform self-feeding with independence within 7 day(s). 2.  Patient will perform grooming in unsupported sitting with supervision/set-up within 7 day(s). 3.  Patient will perform lower body dressing with moderate assistance within 7 day(s). 4.  Patient will perform toilet transfers with moderate assistance x1 within 7 day(s). 5.  Patient will perform all aspects of toileting with moderate assistance  within 7 day(s). 6.  Patient will participate in upper extremity therapeutic exercise/activities with supervision/set-up for 5 minutes within 7 day(s). 7.  Patient will utilize energy conservation techniques during functional activities with verbal cues within 7 day(s). Outcome: Progressing Towards Goal     OCCUPATIONAL THERAPY TREATMENT  Patient: Carlene Posada (14 y.o. female)  Date: 5/8/2020  Diagnosis: Generalized weakness [R53.1]  Weakness [R53.1]   Generalized weakness       Precautions:    Chart, occupational therapy assessment, plan of care, and goals were reviewed. ASSESSMENT  Patient continues with skilled OT services and is progressing towards goals. Pt received in bed with lunch at bedside. Offered OOB to chair for eating lunch but she declined reporting she was to have a cushion for her chair before she sits in the chair again. Encouraged her to transfer OOB to chair once cushion was obtained (provided her with waffle cushion) but she continued to decline activities with OT.   Noted feeding goal as above and provided her with setup for self feeding activities. She is independent with her train. Will continue to progress mobility as tolerated. Recommend discharge to rehab facility. Current Level of Function Impacting Discharge (ADLs): independent with self feeding    Other factors to consider for discharge: Debility         PLAN :  Patient continues to benefit from skilled intervention to address the above impairments. Continue treatment per established plan of care. to address goals. Recommend with staff: OOB to chair TID for meals. Recommend progression to and from bathroom with use of walker and gait belt for toileting. Recommend next OT session: lb dressing, bathing, grooming activities, toileting tasks. Recommendation for discharge: (in order for the patient to meet his/her long term goals)  Therapy up to 5 days/week in SNF setting    This discharge recommendation:  Has been made in collaboration with the attending provider and/or case management    IF patient discharges home will need the following DME: TBD       SUBJECTIVE:   Patient stated I am feeling alright.     OBJECTIVE DATA SUMMARY:   Cognitive/Behavioral Status:  Neurologic State: Alert  Orientation Level: Oriented X4  Cognition: Appropriate for age attention/concentration  Perception: Appears intact  Perseveration: No perseveration noted  Safety/Judgement: Good awareness of safety precautions    Functional Mobility and Transfers for ADLs:  Bed Mobility:     Declined OOB activities  Transfers:   Declined OOB activities          Balance:       ADL Intervention:  Feeding  Feeding Assistance: Supervision     Brought patient a waffle cushion for the chair. She declined any further OOB activities. Assessed self feeding skills and completed with independence.                               Cognitive Retraining  Safety/Judgement: Good awareness of safety precautions      Pain:  No pain    Activity Tolerance:   WNL and Good  Please refer to the flowsheet for vital signs taken during this treatment. After treatment patient left in no apparent distress:   Supine in bed, Heels elevated for pressure relief, and Call bell within reach    COMMUNICATION/COLLABORATION:   The patients plan of care was discussed with: Physical therapist and Registered nurse.      Willa Montesinos OT  Time Calculation: 15 mins

## 2020-05-09 PROCEDURE — 65270000029 HC RM PRIVATE

## 2020-05-09 PROCEDURE — 87635 SARS-COV-2 COVID-19 AMP PRB: CPT

## 2020-05-09 PROCEDURE — 74011250637 HC RX REV CODE- 250/637: Performed by: NURSE PRACTITIONER

## 2020-05-09 PROCEDURE — 74011250637 HC RX REV CODE- 250/637: Performed by: INTERNAL MEDICINE

## 2020-05-09 PROCEDURE — 74011250637 HC RX REV CODE- 250/637: Performed by: HOSPITALIST

## 2020-05-09 PROCEDURE — 74011250636 HC RX REV CODE- 250/636: Performed by: FAMILY MEDICINE

## 2020-05-09 PROCEDURE — 74011250636 HC RX REV CODE- 250/636: Performed by: INTERNAL MEDICINE

## 2020-05-09 PROCEDURE — 77030038269 HC DRN EXT URIN PURWCK BARD -A

## 2020-05-09 RX ORDER — HYDRALAZINE HYDROCHLORIDE 50 MG/1
50 TABLET, FILM COATED ORAL 4 TIMES DAILY
Status: DISCONTINUED | OUTPATIENT
Start: 2020-05-09 | End: 2020-05-09

## 2020-05-09 RX ADMIN — HEPARIN SODIUM 5000 UNITS: 5000 INJECTION INTRAVENOUS; SUBCUTANEOUS at 03:30

## 2020-05-09 RX ADMIN — VITAM B12 100 MCG: 100 TAB at 08:54

## 2020-05-09 RX ADMIN — CARBIDOPA AND LEVODOPA 2 TABLET: 25; 100 TABLET ORAL at 08:54

## 2020-05-09 RX ADMIN — SENNOSIDES AND DOCUSATE SODIUM 1 TABLET: 8.6; 5 TABLET ORAL at 08:54

## 2020-05-09 RX ADMIN — HEPARIN SODIUM 5000 UNITS: 5000 INJECTION INTRAVENOUS; SUBCUTANEOUS at 11:33

## 2020-05-09 RX ADMIN — CLOPIDOGREL BISULFATE 75 MG: 75 TABLET ORAL at 08:54

## 2020-05-09 RX ADMIN — HYDRALAZINE HYDROCHLORIDE 50 MG: 50 TABLET, FILM COATED ORAL at 08:53

## 2020-05-09 RX ADMIN — DIAZEPAM 2 MG: 2 TABLET ORAL at 08:54

## 2020-05-09 RX ADMIN — PANTOPRAZOLE SODIUM 40 MG: 40 TABLET, DELAYED RELEASE ORAL at 06:51

## 2020-05-09 RX ADMIN — METOPROLOL SUCCINATE 50 MG: 50 TABLET, EXTENDED RELEASE ORAL at 21:00

## 2020-05-09 RX ADMIN — POLYETHYLENE GLYCOL 3350 17 G: 17 POWDER, FOR SOLUTION ORAL at 09:00

## 2020-05-09 RX ADMIN — HYDRALAZINE HYDROCHLORIDE 50 MG: 50 TABLET, FILM COATED ORAL at 03:31

## 2020-05-09 RX ADMIN — CARBIDOPA AND LEVODOPA 2 TABLET: 25; 100 TABLET ORAL at 21:01

## 2020-05-09 RX ADMIN — Medication 10 ML: at 06:53

## 2020-05-09 RX ADMIN — CARBIDOPA AND LEVODOPA 2 TABLET: 25; 100 TABLET ORAL at 18:00

## 2020-05-09 RX ADMIN — DULOXETINE HYDROCHLORIDE 120 MG: 60 CAPSULE, DELAYED RELEASE PELLETS ORAL at 08:54

## 2020-05-09 RX ADMIN — HEPARIN SODIUM 5000 UNITS: 5000 INJECTION INTRAVENOUS; SUBCUTANEOUS at 18:00

## 2020-05-09 RX ADMIN — ATORVASTATIN CALCIUM 40 MG: 40 TABLET, FILM COATED ORAL at 21:00

## 2020-05-09 RX ADMIN — HYDRALAZINE HYDROCHLORIDE 75 MG: 50 TABLET, FILM COATED ORAL at 15:47

## 2020-05-09 RX ADMIN — DIAZEPAM 2 MG: 2 TABLET ORAL at 15:47

## 2020-05-09 RX ADMIN — DIAZEPAM 2 MG: 2 TABLET ORAL at 21:01

## 2020-05-09 RX ADMIN — HYDRALAZINE HYDROCHLORIDE 75 MG: 50 TABLET, FILM COATED ORAL at 21:00

## 2020-05-09 RX ADMIN — SODIUM CHLORIDE, SODIUM LACTATE, POTASSIUM CHLORIDE, AND CALCIUM CHLORIDE 75 ML/HR: 600; 310; 30; 20 INJECTION, SOLUTION INTRAVENOUS at 15:48

## 2020-05-09 RX ADMIN — CARBIDOPA AND LEVODOPA 2 TABLET: 25; 100 TABLET ORAL at 13:21

## 2020-05-09 RX ADMIN — ASPIRIN 81 MG: 81 TABLET, COATED ORAL at 08:54

## 2020-05-09 NOTE — PROGRESS NOTES
Bedside shift change report given to Shakeel Arvizu (oncoming nurse) by Leanna Betancur (offgoing nurse). Report included the following information SBAR, Kardex, Intake/Output and MAR.

## 2020-05-09 NOTE — PROGRESS NOTES
Bedside and Verbal shift change report given to [employer], RN (oncoming nurse) by Julieta Hollins RN (offgoing nurse). Report included the following information SBAR, Kardex, Intake/Output, MAR and Recent Results.

## 2020-05-09 NOTE — PROGRESS NOTES
9:01 AM  CM informed patient medically ready for discharge at this time. SNF Authorization has been obtained from [facility] #[id number] and approved through Monday 5/11/20. However at this time no SNF facility has accepted patient for services at this time. [facility] unable to accept and accommodate patient for services. CM still awaiting updates from The [address] [place] [address] [facility] [phone]  and [facility] [phone]. Referrals re-submitted to agency. CM also contacted both facilities to follow-up with no answer. CM will continue to follow and assist with ANDREA needs as they arise. 10:10 AM  CM received a return call from admissions representative, Janice Alamo [phone] [facility] Referral received and currently being reviewed. CM faxed COVID 19 Negative Testing results as requested by facility for their review to [phone]. Liaison to follow-up with CM in regards if they are able to accept and accommodate patient for services today. CM will continue to follow and assist with ANDREA needs s they arise. 2:47 PM  CM received updates from Janice Alamo [phone] with [facility] Facility requesting an updated COVID Testing be completed prior to discharge and acceptance into their facility. Last noted testing that was submitted for facility review was completed 4/30/20. Updates and request provided to Attending to have completed. Facility will be able to accept patient for services after testing has been completed showing negative results.        LADI Alcaraz/DANAE  Care Management

## 2020-05-09 NOTE — PROGRESS NOTES
[facility]  [facility]                                                                                          Hospitalist Progress Note  Eladio Huddleston MD  Answering service: [phone] OR [phone] from in house phone        Date of Service:  2020  NAME:  Eligio Moyer  :  1940  MRN:  [id number]      Admission Summary:     Patient was in her usual state of health until a couple of weeks ago when the patient developed weakness.  The patient described the weakness as generalized weakness, progressive and getting worse.  The patient is unable to walk.  The patient gets meal delivery by [employer] once a day.  The patient stated that she has no appetite and she has not been drinking. Marita Chun also complained of cough, which is nonproductive, not associated with fever, rigors, or chills.  As a result of the weakness, the patient fell at home a couple of times.  The last fall was about 2 weeks ago.  Complaining of generalized body aches and pain as well as discomfort in her neck.  The patient was last admitted to this hospital from 2019 to 2019.  The patient was admitted and treated for acute CVA.  The patient has left carotid artery stenosis, underwent endarterectomy in the past.  According to the patient, the left carotid artery is now blocked again and the patient is awaiting revascularization of the left carotid artery.  The procedure was postponed because of the COVID-19 pandemic.  When the patient arrived at the emergency room, chest x-ray was obtained.  The chest x-ray was without any acute findings.  The patient was not able to ambulate, was referred to the hospitalist service for evaluation for admission. Interval history / Subjective:        Follow up Generalized weakness  Patient seen and examined by the bedside, Labs, images and notes reviewed  Denies any complaints except needs tear drops for itching in eyes, has h/o dry eyes  Discussed with nursing staff, orders reviewed. Plan discussed with patient/Family       Assessment & Plan:     Generalized weakness  -Status post mechanical fall from weakness  -Secondary to progression of Parkinson  -Initial head CT without acute abnormality, MRI of the brain negative for CVA, MRI of the C-spine shows spondylosis with mild spinal stenosis at the remaining to mild cord compression most pronounced at C4-5. No significant change since 7/2019  -PT following the patient. Patient now agreeable to SNF  - Pending SNF placement    Parkinson's disease  -continue Sinemet    Coronary artery disease  -With elevated troponin upon admission with EKG changes  -Cardiology evaluating the patient  -Stress test shows negative for ischemia    Acute kidney injury  -Patient has baseline CKD  -Improving with IV fluid, at baseline now    Hypertension  -Blood pressure improving, continue monitor, increasing Hydralazine dose    Atrial fibrillation  -Paroxysmal atrial fibrillation  -Currently in sinus rhythm  -Patient is not a candidate for anticoagulation per cardiology    Carotic artery stenosis  -Plan for outpatient surgery    Dyslipidemia  -Statin    Anxiety and depression  -Stable    Anemia  -Anemia of chronic kidney disease  -H&H stable    Code status: FULL  DVT prophylaxis: Heparin    Care Plan discussed with: Patient/Family and Nurse  Anticipated Disposition: SNF/LTC  Anticipated Discharge: 24 - 48 hr     Hospital Problems  Date Reviewed: 5/1/2020          Codes Class Noted POA    Weakness ICD-10-CM: R53.1  ICD-9-CM: 780.79  5/4/2020 Unknown        * (Principal) Generalized weakness ICD-10-CM: R53.1  ICD-9-CM: 780.79  4/30/2020 Yes                Review of Systems:   A comprehensive review of systems was negative except for that written in the HPI. Vital Signs:    Last 24hrs VS reviewed since prior progress note.  Most recent are:  Visit Vitals  /74 (BP 1 Location: Right arm, BP Patient Position: At rest)   Pulse 69 Temp 98 °F (36.7 °C)   Resp 16   Ht 5' 5\" (1.651 m)   Wt 79.6 kg (175 lb 7.8 oz)   SpO2 95%   BMI 29.20 kg/m²         Intake/Output Summary (Last 24 hours) at 5/9/2020 0913  Last data filed at 5/9/2020 0646  Gross per 24 hour   Intake    Output 1100 ml   Net -1100 ml        Physical Examination:             Constitutional:  No acute distress, cooperative, pleasant    ENT:  Oral mucosa moist, oropharynx benign. Resp:  CTA bilaterally. No wheezing/rhonchi/rales. No accessory muscle use   CV:  Regular rhythm, normal rate, no murmurs, gallops, rubs    GI:  Soft, non distended, non tender. normoactive bowel sounds, no hepatosplenomegaly     Musculoskeletal:  No edema, warm, 2+ pulses throughout    Neurologic:  Moves all extremities. AAOx3, CN II-XII reviewed     Skin:  Good turgor, no rashes or ulcers       Data Review:    Review and/or order of clinical lab test      Labs:     No results for input(s): WBC, HGB, HCT, PLT, HGBEXT, HCTEXT, PLTEXT, HGBEXT, HCTEXT, PLTEXT in the last 72 hours. Recent Labs     05/08/20  0340      K 4.1   *   CO2 27   BUN 23*   CREA 1.19*   *   CA 9.9     No results for input(s): SGOT, GPT, ALT, AP, TBIL, TBILI, TP, ALB, GLOB, GGT, AML, LPSE in the last 72 hours. No lab exists for component: AMYP, HLPSE  No results for input(s): INR, PTP, APTT, INREXT, INREXT in the last 72 hours. No results for input(s): FE, TIBC, PSAT, FERR in the last 72 hours. No results found for: FOL, RBCF   No results for input(s): PH, PCO2, PO2 in the last 72 hours. No results for input(s): CPK, CKNDX, TROIQ in the last 72 hours.     No lab exists for component: CPKMB  Lab Results   Component Value Date/Time    Cholesterol, total 129 05/01/2020 06:37 AM    HDL Cholesterol 36 05/01/2020 06:37 AM    LDL, calculated 74.6 05/01/2020 06:37 AM    Triglyceride 92 05/01/2020 06:37 AM    CHOL/HDL Ratio 3.6 05/01/2020 06:37 AM     Lab Results   Component Value Date/Time    Glucose (POC) 139 (H) 07/26/2019 04:34 PM    Glucose (POC) 126 (H) 07/26/2019 11:01 AM    Glucose (POC) 94 07/12/2019 05:40 PM    Glucose (POC) 135 (H) 02/17/2015 06:46 AM    Glucose (POC) 129 (H) 02/16/2015 09:58 PM     Lab Results   Component Value Date/Time    Color DARK YELLOW 04/30/2020 07:57 PM    Appearance CLEAR 04/30/2020 07:57 PM    Specific gravity 1.025 04/30/2020 07:57 PM    pH (UA) 5.0 04/30/2020 07:57 PM    Protein 100 (A) 04/30/2020 07:57 PM    Glucose Negative 04/30/2020 07:57 PM    Ketone 15 (A) 04/30/2020 07:57 PM    Bilirubin NEGATIVE  07/11/2019 10:17 AM    Urobilinogen 1.0 04/30/2020 07:57 PM    Nitrites Negative 04/30/2020 07:57 PM    Leukocyte Esterase Negative 04/30/2020 07:57 PM    Epithelial cells FEW 04/30/2020 07:57 PM    Bacteria Negative 04/30/2020 07:57 PM    WBC 0-4 04/30/2020 07:57 PM    RBC 0-5 04/30/2020 07:57 PM         Medications Reviewed:     Current Facility-Administered Medications   Medication Dose Route Frequency    hydrALAZINE (APRESOLINE) tablet 50 mg  50 mg Oral QID    polyvinyl alcohol-povidone (NATURAL TEARS) 0.5-0.6 % ophthalmic solution 1 Drop  1 Drop Both Eyes PRN    metoprolol succinate (TOPROL-XL) XL tablet 50 mg  50 mg Oral QHS    senna-docusate (PERICOLACE) 8.6-50 mg per tablet 1 Tab  1 Tab Oral DAILY    polyethylene glycol (MIRALAX) packet 17 g  17 g Oral DAILY    ARIPiprazole (ABILIFY) tablet 15 mg  15 mg Oral DAILY    aspirin delayed-release tablet 81 mg  81 mg Oral DAILY    atorvastatin (LIPITOR) tablet 40 mg  40 mg Oral QHS    carbidopa-levodopa (SINEMET)  mg per tablet 2 Tab  2 Tab Oral QID    clopidogreL (PLAVIX) tablet 75 mg  75 mg Oral DAILY AFTER BREAKFAST    cyanocobalamin (VITAMIN B12) tablet 100 mcg  100 mcg Oral DAILY    diazePAM (VALIUM) tablet 2 mg  2 mg Oral TID    DULoxetine (CYMBALTA) capsule 120 mg  120 mg Oral DAILY    pantoprazole (PROTONIX) tablet 40 mg  40 mg Oral ACB    nitroglycerin (NITROSTAT) tablet 0.4 mg  0.4 mg SubLINGual Q5MIN PRN  sodium chloride (NS) flush 5-40 mL  5-40 mL IntraVENous Q8H    sodium chloride (NS) flush 5-40 mL  5-40 mL IntraVENous PRN    ondansetron (ZOFRAN) injection 4 mg  4 mg IntraVENous Q4H PRN    bisacodyL (DULCOLAX) tablet 5 mg  5 mg Oral DAILY PRN    heparin (porcine) injection 5,000 Units  5,000 Units SubCUTAneous Q8H    acetaminophen (TYLENOL) tablet 650 mg  650 mg Oral Q4H PRN    lactated Ringers infusion  75 mL/hr IntraVENous CONTINUOUS     ______________________________________________________________________  EXPECTED LENGTH OF STAY: 3d 21h  ACTUAL LENGTH OF STAY:          9                 Adria Joshua MD

## 2020-05-09 NOTE — PROGRESS NOTES
Problem: Falls - Risk of  Goal: *Absence of Falls  Description: Document He Naranjo Fall Risk and appropriate interventions in the flowsheet. Outcome: Progressing Towards Goal  Note: Fall Risk Interventions:  Mobility Interventions: PT Consult for mobility concerns         Medication Interventions: Patient to call before getting OOB    Elimination Interventions: Call light in reach    History of Falls Interventions: Door open when patient unattended       1278  Bedside shift change report given to Dany Mustafa RN (oncoming nurse) by LUIS ENRIQUE Pantoja RN (offgoing nurse). Report included the following information SBAR, Kardex and Recent Results.

## 2020-05-09 NOTE — PROGRESS NOTES
[facility]                                                                                          Hospitalist Progress Note  Trang Eagle MD  Answering service: [phone] or [phone] from in house phone        Date of Service:  2020  NAME:  Natalya Cox  :  1940  MRN:  [id number]      Admission Summary:     Patient was in her usual state of health until a couple of weeks ago when the patient developed weakness.  The patient described the weakness as generalized weakness, progressive and getting worse.  The patient is unable to walk.  The patient gets meal delivery by [employer] once a day.  The patient stated that she has no appetite and she has not been drinking. Des Boyd also complained of cough, which is nonproductive, not associated with fever, rigors, or chills.  As a result of the weakness, the patient fell at home a couple of times.  The last fall was about 2 weeks ago.  Complaining of generalized body aches and pain as well as discomfort in her neck.  The patient was last admitted to this hospital from 2019 to 2019.  The patient was admitted and treated for acute CVA.  The patient has left carotid artery stenosis, underwent endarterectomy in the past.  According to the patient, the left carotid artery is now blocked again and the patient is awaiting revascularization of the left carotid artery.  The procedure was postponed because of the COVID-19 pandemic.  When the patient arrived at the emergency room, chest x-ray was obtained.  The chest x-ray was without any acute findings.  The patient was not able to ambulate, was referred to the hospitalist service for evaluation for admission. Interval history / Subjective: Follow up Generalized weakness  Patient seen and examined by the bedside, Labs, images and notes reviewed  Awake,sitting up on chair, Denies any complaints  Discussed with nursing staff, orders reviewed.    Plan discussed with patient/Family       Assessment & Plan:     Generalized weakness  -Status post mechanical fall from weakness  -Secondary to progression of Parkinson  -Initial head CT without acute abnormality, MRI of the brain negative for CVA, MRI of the C-spine shows spondylosis with mild spinal stenosis at the remaining to mild cord compression most pronounced at C4-5. No significant change since 7/2019  -PT following the patient. Patient now agreeable to SNF    Parkinson's disease  -continue Sinemet    Coronary artery disease  -With elevated troponin upon admission with EKG changes  -Cardiology evaluating the patient  -Stress test shows negative for ischemia    Acute kidney injury  -Patient has baseline CKD  -Improving with IV fluid, at baseline now    Hypertension  -Blood pressure improving, continue monitor    Atrial fibrillation  -Paroxysmal atrial fibrillation  -Currently in sinus rhythm  -Patient is not a candidate for anticoagulation per cardiology    Carotic artery stenosis  -Plan for outpatient surgery    Dyslipidemia  -Statin    Anxiety and depression  -Stable    Anemia  -Anemia of chronic kidney disease  -H&H stable    Code status: FULL  DVT prophylaxis: Heparin    Care Plan discussed with: Patient/Family and Nurse  Anticipated Disposition: SNF/LTC  Anticipated Discharge: 24 - 48 hr     Hospital Problems  Date Reviewed: 5/1/2020          Codes Class Noted POA    Weakness ICD-10-CM: R53.1  ICD-9-CM: 780.79  5/4/2020 Unknown        * (Principal) Generalized weakness ICD-10-CM: R53.1  ICD-9-CM: 780.79  4/30/2020 Yes                Review of Systems:   A comprehensive review of systems was negative except for that written in the HPI. Vital Signs:    Last 24hrs VS reviewed since prior progress note.  Most recent are:  Visit Vitals  /74 (BP 1 Location: Right arm, BP Patient Position: At rest)   Pulse 69   Temp 98 °F (36.7 °C)   Resp 16   Ht 5' 5\" (1.651 m)   Wt 79.6 kg (175 lb 7.8 oz)   SpO2 95% BMI 29.20 kg/m²         Intake/Output Summary (Last 24 hours) at 5/9/2020 0912  Last data filed at 5/9/2020 0646  Gross per 24 hour   Intake    Output 1100 ml   Net -1100 ml        Physical Examination:             Constitutional:  No acute distress, cooperative, pleasant    ENT:  Oral mucosa moist, oropharynx benign. Resp:  CTA bilaterally. No wheezing/rhonchi/rales. No accessory muscle use   CV:  Regular rhythm, normal rate, no murmurs, gallops, rubs    GI:  Soft, non distended, non tender. normoactive bowel sounds, no hepatosplenomegaly     Musculoskeletal:  No edema, warm, 2+ pulses throughout    Neurologic:  Moves all extremities. AAOx3, CN II-XII reviewed     Skin:  Good turgor, no rashes or ulcers       Data Review:    Review and/or order of clinical lab test      Labs:     No results for input(s): WBC, HGB, HCT, PLT, HGBEXT, HCTEXT, PLTEXT, HGBEXT, HCTEXT, PLTEXT in the last 72 hours. Recent Labs     05/08/20  0340      K 4.1   *   CO2 27   BUN 23*   CREA 1.19*   *   CA 9.9     No results for input(s): SGOT, GPT, ALT, AP, TBIL, TBILI, TP, ALB, GLOB, GGT, AML, LPSE in the last 72 hours. No lab exists for component: AMYP, HLPSE  No results for input(s): INR, PTP, APTT, INREXT, INREXT in the last 72 hours. No results for input(s): FE, TIBC, PSAT, FERR in the last 72 hours. No results found for: FOL, RBCF   No results for input(s): PH, PCO2, PO2 in the last 72 hours. No results for input(s): CPK, CKNDX, TROIQ in the last 72 hours.     No lab exists for component: CPKMB  Lab Results   Component Value Date/Time    Cholesterol, total 129 05/01/2020 06:37 AM    HDL Cholesterol 36 05/01/2020 06:37 AM    LDL, calculated 74.6 05/01/2020 06:37 AM    Triglyceride 92 05/01/2020 06:37 AM    CHOL/HDL Ratio 3.6 05/01/2020 06:37 AM     Lab Results   Component Value Date/Time    Glucose (POC) 139 (H) 07/26/2019 04:34 PM    Glucose (POC) 126 (H) 07/26/2019 11:01 AM    Glucose (POC) 94 07/12/2019 05:40 PM    Glucose (POC) 135 (H) 02/17/2015 06:46 AM    Glucose (POC) 129 (H) 02/16/2015 09:58 PM     Lab Results   Component Value Date/Time    Color DARK YELLOW 04/30/2020 07:57 PM    Appearance CLEAR 04/30/2020 07:57 PM    Specific gravity 1.025 04/30/2020 07:57 PM    pH (UA) 5.0 04/30/2020 07:57 PM    Protein 100 (A) 04/30/2020 07:57 PM    Glucose Negative 04/30/2020 07:57 PM    Ketone 15 (A) 04/30/2020 07:57 PM    Bilirubin NEGATIVE  07/11/2019 10:17 AM    Urobilinogen 1.0 04/30/2020 07:57 PM    Nitrites Negative 04/30/2020 07:57 PM    Leukocyte Esterase Negative 04/30/2020 07:57 PM    Epithelial cells FEW 04/30/2020 07:57 PM    Bacteria Negative 04/30/2020 07:57 PM    WBC 0-4 04/30/2020 07:57 PM    RBC 0-5 04/30/2020 07:57 PM         Medications Reviewed:     Current Facility-Administered Medications   Medication Dose Route Frequency    hydrALAZINE (APRESOLINE) tablet 50 mg  50 mg Oral QID    polyvinyl alcohol-povidone (NATURAL TEARS) 0.5-0.6 % ophthalmic solution 1 Drop  1 Drop Both Eyes PRN    metoprolol succinate (TOPROL-XL) XL tablet 50 mg  50 mg Oral QHS    senna-docusate (PERICOLACE) 8.6-50 mg per tablet 1 Tab  1 Tab Oral DAILY    polyethylene glycol (MIRALAX) packet 17 g  17 g Oral DAILY    ARIPiprazole (ABILIFY) tablet 15 mg  15 mg Oral DAILY    aspirin delayed-release tablet 81 mg  81 mg Oral DAILY    atorvastatin (LIPITOR) tablet 40 mg  40 mg Oral QHS    carbidopa-levodopa (SINEMET)  mg per tablet 2 Tab  2 Tab Oral QID    clopidogreL (PLAVIX) tablet 75 mg  75 mg Oral DAILY AFTER BREAKFAST    cyanocobalamin (VITAMIN B12) tablet 100 mcg  100 mcg Oral DAILY    diazePAM (VALIUM) tablet 2 mg  2 mg Oral TID    DULoxetine (CYMBALTA) capsule 120 mg  120 mg Oral DAILY    pantoprazole (PROTONIX) tablet 40 mg  40 mg Oral ACB    nitroglycerin (NITROSTAT) tablet 0.4 mg  0.4 mg SubLINGual Q5MIN PRN    sodium chloride (NS) flush 5-40 mL  5-40 mL IntraVENous Q8H    sodium chloride (NS) flush 5-40 mL  5-40 mL IntraVENous PRN    ondansetron (ZOFRAN) injection 4 mg  4 mg IntraVENous Q4H PRN    bisacodyL (DULCOLAX) tablet 5 mg  5 mg Oral DAILY PRN    heparin (porcine) injection 5,000 Units  5,000 Units SubCUTAneous Q8H    acetaminophen (TYLENOL) tablet 650 mg  650 mg Oral Q4H PRN    lactated Ringers infusion  75 mL/hr IntraVENous CONTINUOUS     ______________________________________________________________________  EXPECTED LENGTH OF STAY: 3d 21h  ACTUAL LENGTH OF STAY:          9                 Lillian Vázquez MD

## 2020-05-09 NOTE — PROGRESS NOTES
Problem: Falls - Risk of  Goal: *Absence of Falls  Description: Document Paddy Rocker Fall Risk and appropriate interventions in the flowsheet. Outcome: Progressing Towards Goal  Note: Fall Risk Interventions:  Mobility Interventions: Patient to call before getting OOB, Communicate number of staff needed for ambulation/transfer         Medication Interventions: Evaluate medications/consider consulting pharmacy, Patient to call before getting OOB    Elimination Interventions: Call light in reach, Toileting schedule/hourly rounds    History of Falls Interventions: Evaluate medications/consider consulting pharmacy         Problem: Pressure Injury - Risk of  Goal: *Prevention of pressure injury  Description: Document Gregorio Scale and appropriate interventions in the flowsheet.   Outcome: Progressing Towards Goal  Note: Pressure Injury Interventions:  Sensory Interventions: Assess changes in LOC    Moisture Interventions: Absorbent underpads    Activity Interventions: Increase time out of bed    Mobility Interventions: HOB 30 degrees or less, Float heels    Nutrition Interventions: Document food/fluid/supplement intake    Friction and Shear Interventions: HOB 30 degrees or less, Apply protective barrier, creams and emollients

## 2020-05-09 NOTE — PROGRESS NOTES
Patient's BP was 147/60 ;HR 58 at 2108. Repeated it again at 2128 and it was 153/70, HR 58. Talked to the hospitalist about the low HR of 58 and he said I hold Metoprolol and give hydralazine.

## 2020-05-09 NOTE — PROGRESS NOTES
Bedside and Verbal shift change report given to LAMONT Chavez (oncoming nurse) by Chandu Reinoso (offgoing nurse). Report included the following information SBAR, Kardex and MAR.

## 2020-05-10 LAB
ANION GAP SERPL CALC-SCNC: 5 MMOL/L (ref 5–15)
BASOPHILS # BLD: 0 K/UL (ref 0–0.1)
BASOPHILS NFR BLD: 1 % (ref 0–1)
BUN SERPL-MCNC: 25 MG/DL (ref 6–20)
BUN/CREAT SERPL: 20 (ref 12–20)
CALCIUM SERPL-MCNC: 10 MG/DL (ref 8.5–10.1)
CHLORIDE SERPL-SCNC: 108 MMOL/L (ref 97–108)
CO2 SERPL-SCNC: 25 MMOL/L (ref 21–32)
CREAT SERPL-MCNC: 1.25 MG/DL (ref 0.55–1.02)
DIFFERENTIAL METHOD BLD: ABNORMAL
EOSINOPHIL # BLD: 0.2 K/UL (ref 0–0.4)
EOSINOPHIL NFR BLD: 5 % (ref 0–7)
ERYTHROCYTE [DISTWIDTH] IN BLOOD BY AUTOMATED COUNT: 14.4 % (ref 11.5–14.5)
GLUCOSE SERPL-MCNC: 129 MG/DL (ref 65–100)
HCT VFR BLD AUTO: 31.6 % (ref 35–47)
HGB BLD-MCNC: 10.2 G/DL (ref 11.5–16)
IMM GRANULOCYTES # BLD AUTO: 0 K/UL (ref 0–0.04)
IMM GRANULOCYTES NFR BLD AUTO: 1 % (ref 0–0.5)
LYMPHOCYTES # BLD: 1.2 K/UL (ref 0.8–3.5)
LYMPHOCYTES NFR BLD: 26 % (ref 12–49)
MCH RBC QN AUTO: 30.1 PG (ref 26–34)
MCHC RBC AUTO-ENTMCNC: 32.3 G/DL (ref 30–36.5)
MCV RBC AUTO: 93.2 FL (ref 80–99)
MONOCYTES # BLD: 0.7 K/UL (ref 0–1)
MONOCYTES NFR BLD: 15 % (ref 5–13)
NEUTS SEG # BLD: 2.6 K/UL (ref 1.8–8)
NEUTS SEG NFR BLD: 52 % (ref 32–75)
NRBC # BLD: 0 K/UL (ref 0–0.01)
NRBC BLD-RTO: 0 PER 100 WBC
PLATELET # BLD AUTO: 231 K/UL (ref 150–400)
PMV BLD AUTO: 9 FL (ref 8.9–12.9)
POTASSIUM SERPL-SCNC: 4 MMOL/L (ref 3.5–5.1)
RBC # BLD AUTO: 3.39 M/UL (ref 3.8–5.2)
SARS-COV-2, COV2: NOT DETECTED
SODIUM SERPL-SCNC: 138 MMOL/L (ref 136–145)
SPECIMEN SOURCE, FCOV2M: NORMAL
WBC # BLD AUTO: 4.8 K/UL (ref 3.6–11)

## 2020-05-10 PROCEDURE — 74011250636 HC RX REV CODE- 250/636: Performed by: INTERNAL MEDICINE

## 2020-05-10 PROCEDURE — 80048 BASIC METABOLIC PNL TOTAL CA: CPT

## 2020-05-10 PROCEDURE — 65270000029 HC RM PRIVATE

## 2020-05-10 PROCEDURE — 74011250637 HC RX REV CODE- 250/637: Performed by: INTERNAL MEDICINE

## 2020-05-10 PROCEDURE — 36415 COLL VENOUS BLD VENIPUNCTURE: CPT

## 2020-05-10 PROCEDURE — 85025 COMPLETE CBC W/AUTO DIFF WBC: CPT

## 2020-05-10 PROCEDURE — 77030038269 HC DRN EXT URIN PURWCK BARD -A

## 2020-05-10 PROCEDURE — 74011250637 HC RX REV CODE- 250/637: Performed by: HOSPITALIST

## 2020-05-10 PROCEDURE — 94760 N-INVAS EAR/PLS OXIMETRY 1: CPT

## 2020-05-10 RX ADMIN — ARIPIPRAZOLE 15 MG: 10 TABLET ORAL at 10:01

## 2020-05-10 RX ADMIN — HEPARIN SODIUM 5000 UNITS: 5000 INJECTION INTRAVENOUS; SUBCUTANEOUS at 11:39

## 2020-05-10 RX ADMIN — HYDRALAZINE HYDROCHLORIDE 75 MG: 50 TABLET, FILM COATED ORAL at 09:00

## 2020-05-10 RX ADMIN — VITAM B12 100 MCG: 100 TAB at 09:01

## 2020-05-10 RX ADMIN — HYDRALAZINE HYDROCHLORIDE 75 MG: 50 TABLET, FILM COATED ORAL at 21:13

## 2020-05-10 RX ADMIN — Medication 1 DROP: at 21:18

## 2020-05-10 RX ADMIN — DIAZEPAM 2 MG: 2 TABLET ORAL at 21:13

## 2020-05-10 RX ADMIN — CARBIDOPA AND LEVODOPA 2 TABLET: 25; 100 TABLET ORAL at 21:13

## 2020-05-10 RX ADMIN — DULOXETINE HYDROCHLORIDE 120 MG: 60 CAPSULE, DELAYED RELEASE PELLETS ORAL at 09:01

## 2020-05-10 RX ADMIN — DIAZEPAM 2 MG: 2 TABLET ORAL at 16:39

## 2020-05-10 RX ADMIN — PANTOPRAZOLE SODIUM 40 MG: 40 TABLET, DELAYED RELEASE ORAL at 06:32

## 2020-05-10 RX ADMIN — ATORVASTATIN CALCIUM 40 MG: 40 TABLET, FILM COATED ORAL at 21:13

## 2020-05-10 RX ADMIN — CARBIDOPA AND LEVODOPA 2 TABLET: 25; 100 TABLET ORAL at 09:01

## 2020-05-10 RX ADMIN — HEPARIN SODIUM 5000 UNITS: 5000 INJECTION INTRAVENOUS; SUBCUTANEOUS at 03:19

## 2020-05-10 RX ADMIN — DIAZEPAM 2 MG: 2 TABLET ORAL at 09:00

## 2020-05-10 RX ADMIN — SENNOSIDES AND DOCUSATE SODIUM 1 TABLET: 8.6; 5 TABLET ORAL at 09:00

## 2020-05-10 RX ADMIN — CLOPIDOGREL BISULFATE 75 MG: 75 TABLET ORAL at 09:01

## 2020-05-10 RX ADMIN — CARBIDOPA AND LEVODOPA 2 TABLET: 25; 100 TABLET ORAL at 18:21

## 2020-05-10 RX ADMIN — HYDRALAZINE HYDROCHLORIDE 75 MG: 50 TABLET, FILM COATED ORAL at 16:39

## 2020-05-10 RX ADMIN — HEPARIN SODIUM 5000 UNITS: 5000 INJECTION INTRAVENOUS; SUBCUTANEOUS at 18:22

## 2020-05-10 RX ADMIN — Medication 10 ML: at 14:00

## 2020-05-10 RX ADMIN — ASPIRIN 81 MG: 81 TABLET, COATED ORAL at 09:01

## 2020-05-10 RX ADMIN — CARBIDOPA AND LEVODOPA 2 TABLET: 25; 100 TABLET ORAL at 13:07

## 2020-05-10 NOTE — PROGRESS NOTES
Bedside and Verbal shift change report given to LAMONT Day (oncoming nurse) by Harshad Stanford RN (offgoing nurse). Report included the following information SBAR.

## 2020-05-10 NOTE — PROGRESS NOTES
[facility]  Hospitalist Group                                                                                          Hospitalist Progress Note  Tu Davis MD  Answering service: [phone] OR [phone] from in house phone        Date of Service:  5/10/2020  NAME:  Rosemarie Goldman  :  1940  MRN:  [id number]      Admission Summary:     Patient was in her usual state of health until a couple of weeks ago when the patient developed weakness.  The patient described the weakness as generalized weakness, progressive and getting worse.  The patient is unable to walk.  The patient gets meal delivery by [employer] once a day.  The patient stated that she has no appetite and she has not been drinking. Ora Armenta also complained of cough, which is nonproductive, not associated with fever, rigors, or chills.  As a result of the weakness, the patient fell at home a couple of times.  The last fall was about 2 weeks ago.  Complaining of generalized body aches and pain as well as discomfort in her neck.  The patient was last admitted to this hospital from 2019 to 2019.  The patient was admitted and treated for acute CVA.  The patient has left carotid artery stenosis, underwent endarterectomy in the past.  According to the patient, the left carotid artery is now blocked again and the patient is awaiting revascularization of the left carotid artery.  The procedure was postponed because of the COVID-19 pandemic.  When the patient arrived at the emergency room, chest x-ray was obtained.  The chest x-ray was without any acute findings.  The patient was not able to ambulate, was referred to the hospitalist service for evaluation for admission. Interval history / Subjective: Follow up Generalized weakness  Patient seen and examined by the bedside, Labs, images and notes reviewed  Denies any complaints  Discussed with nursing staff, orders reviewed.    Plan discussed with patient/Family       Assessment & Plan:     Generalized weakness  -Status post mechanical fall from weakness  -Secondary to progression of Parkinson  -Initial head CT without acute abnormality, MRI of the brain negative for CVA, MRI of the C-spine shows spondylosis with mild spinal stenosis at the remaining to mild cord compression most pronounced at C4-5. No significant change since 7/2019  -PT following the patient. Patient now agreeable to SNF  - Pending SNF placement    Parkinson's disease  -continue Sinemet    Coronary artery disease  -With elevated troponin upon admission with EKG changes  -Cardiology evaluating the patient  -Stress test shows negative for ischemia    Acute kidney injury  -Patient has baseline CKD  -Improving with IV fluid, at baseline now    Hypertension  -Blood pressure improving, continue monitor, increasing Hydralazine dose    Atrial fibrillation  -Paroxysmal atrial fibrillation  -Currently in sinus rhythm  -Patient is not a candidate for anticoagulation per cardiology    Carotic artery stenosis  -Plan for outpatient surgery    Dyslipidemia  -Statin    Anxiety and depression  -Stable    Anemia  -Anemia of chronic kidney disease  -H&H stable    Code status: FULL  DVT prophylaxis: Heparin    Care Plan discussed with: Patient/Family and Nurse  Anticipated Disposition: SNF/LTC  Anticipated Discharge: 24 - 48 hr     Hospital Problems  Date Reviewed: 5/1/2020          Codes Class Noted POA    Weakness ICD-10-CM: R53.1  ICD-9-CM: 780.79  5/4/2020 Unknown        * (Principal) Generalized weakness ICD-10-CM: R53.1  ICD-9-CM: 780.79  4/30/2020 Yes                Review of Systems:   A comprehensive review of systems was negative except for that written in the HPI. Vital Signs:    Last 24hrs VS reviewed since prior progress note.  Most recent are:  Visit Vitals  /73 (BP 1 Location: Left arm, BP Patient Position: At rest)   Pulse 61   Temp 98.1 °F (36.7 °C)   Resp 16   Ht 5' 5\" (1.651 m) Wt 79.6 kg (175 lb 7.8 oz)   SpO2 95%   BMI 29.20 kg/m²         Intake/Output Summary (Last 24 hours) at 5/10/2020 1416  Last data filed at 5/9/2020 1800  Gross per 24 hour   Intake 250 ml   Output 500 ml   Net -250 ml        Physical Examination:             Constitutional:  No acute distress, cooperative, pleasant    ENT:  Oral mucosa moist, oropharynx benign. Resp:  CTA bilaterally. No wheezing/rhonchi/rales. No accessory muscle use   CV:  Regular rhythm, normal rate, no murmurs, gallops, rubs    GI:  Soft, non distended, non tender. normoactive bowel sounds, no hepatosplenomegaly     Musculoskeletal:  No edema, warm, 2+ pulses throughout    Neurologic:  Moves all extremities. AAOx3, CN II-XII reviewed     Skin:  Good turgor, no rashes or ulcers       Data Review:    Review and/or order of clinical lab test      Labs:     Recent Labs     05/10/20  0321   WBC 4.8   HGB 10.2*   HCT 31.6*        Recent Labs     05/10/20  0321 05/08/20  0340    140   K 4.0 4.1    109*   CO2 25 27   BUN 25* 23*   CREA 1.25* 1.19*   * 123*   CA 10.0 9.9     No results for input(s): SGOT, GPT, ALT, AP, TBIL, TBILI, TP, ALB, GLOB, GGT, AML, LPSE in the last 72 hours. No lab exists for component: AMYP, HLPSE  No results for input(s): INR, PTP, APTT, INREXT, INREXT in the last 72 hours. No results for input(s): FE, TIBC, PSAT, FERR in the last 72 hours. No results found for: FOL, RBCF   No results for input(s): PH, PCO2, PO2 in the last 72 hours. No results for input(s): CPK, CKNDX, TROIQ in the last 72 hours.     No lab exists for component: CPKMB  Lab Results   Component Value Date/Time    Cholesterol, total 129 05/01/2020 06:37 AM    HDL Cholesterol 36 05/01/2020 06:37 AM    LDL, calculated 74.6 05/01/2020 06:37 AM    Triglyceride 92 05/01/2020 06:37 AM    CHOL/HDL Ratio 3.6 05/01/2020 06:37 AM     Lab Results   Component Value Date/Time    Glucose (POC) 139 (H) 07/26/2019 04:34 PM    Glucose (POC) 126 (H) 07/26/2019 11:01 AM    Glucose (POC) 94 07/12/2019 05:40 PM    Glucose (POC) 135 (H) 02/17/2015 06:46 AM    Glucose (POC) 129 (H) 02/16/2015 09:58 PM     Lab Results   Component Value Date/Time    Color DARK YELLOW 04/30/2020 07:57 PM    Appearance CLEAR 04/30/2020 07:57 PM    Specific gravity 1.025 04/30/2020 07:57 PM    pH (UA) 5.0 04/30/2020 07:57 PM    Protein 100 (A) 04/30/2020 07:57 PM    Glucose Negative 04/30/2020 07:57 PM    Ketone 15 (A) 04/30/2020 07:57 PM    Bilirubin NEGATIVE  07/11/2019 10:17 AM    Urobilinogen 1.0 04/30/2020 07:57 PM    Nitrites Negative 04/30/2020 07:57 PM    Leukocyte Esterase Negative 04/30/2020 07:57 PM    Epithelial cells FEW 04/30/2020 07:57 PM    Bacteria Negative 04/30/2020 07:57 PM    WBC 0-4 04/30/2020 07:57 PM    RBC 0-5 04/30/2020 07:57 PM         Medications Reviewed:     Current Facility-Administered Medications   Medication Dose Route Frequency    polyvinyl alcohol-povidone (NATURAL TEARS) 0.5-0.6 % ophthalmic solution 1 Drop  1 Drop Both Eyes PRN    hydrALAZINE (APRESOLINE) tablet 75 mg  75 mg Oral TID    metoprolol succinate (TOPROL-XL) XL tablet 50 mg  50 mg Oral QHS    senna-docusate (PERICOLACE) 8.6-50 mg per tablet 1 Tab  1 Tab Oral DAILY    polyethylene glycol (MIRALAX) packet 17 g  17 g Oral DAILY    ARIPiprazole (ABILIFY) tablet 15 mg  15 mg Oral DAILY    aspirin delayed-release tablet 81 mg  81 mg Oral DAILY    atorvastatin (LIPITOR) tablet 40 mg  40 mg Oral QHS    carbidopa-levodopa (SINEMET)  mg per tablet 2 Tab  2 Tab Oral QID    clopidogreL (PLAVIX) tablet 75 mg  75 mg Oral DAILY AFTER BREAKFAST    cyanocobalamin (VITAMIN B12) tablet 100 mcg  100 mcg Oral DAILY    diazePAM (VALIUM) tablet 2 mg  2 mg Oral TID    DULoxetine (CYMBALTA) capsule 120 mg  120 mg Oral DAILY    pantoprazole (PROTONIX) tablet 40 mg  40 mg Oral ACB    nitroglycerin (NITROSTAT) tablet 0.4 mg  0.4 mg SubLINGual Q5MIN PRN    sodium chloride (NS) flush 5-40 mL  5-40 mL IntraVENous Q8H    sodium chloride (NS) flush 5-40 mL  5-40 mL IntraVENous PRN    ondansetron (ZOFRAN) injection 4 mg  4 mg IntraVENous Q4H PRN    bisacodyL (DULCOLAX) tablet 5 mg  5 mg Oral DAILY PRN    heparin (porcine) injection 5,000 Units  5,000 Units SubCUTAneous Q8H    acetaminophen (TYLENOL) tablet 650 mg  650 mg Oral Q4H PRN    lactated Ringers infusion  75 mL/hr IntraVENous CONTINUOUS     ______________________________________________________________________  EXPECTED LENGTH OF STAY: 3d 21h  ACTUAL LENGTH OF STAY:          10                 Curtis Rome MD

## 2020-05-10 NOTE — PROGRESS NOTES
Bedside and Verbal shift change report given to [address] [place] [address] Nando (oncoming nurse) by Emilie Martínez (offgoing nurse). Report included the following information SBAR, Kardex and MAR.

## 2020-05-11 ENCOUNTER — TELEPHONE (OUTPATIENT)
Dept: CARDIOLOGY CLINIC | Age: 80
End: 2020-05-11

## 2020-05-11 VITALS
WEIGHT: 175.49 LBS | SYSTOLIC BLOOD PRESSURE: 136 MMHG | BODY MASS INDEX: 29.24 KG/M2 | TEMPERATURE: 97.7 F | HEIGHT: 65 IN | RESPIRATION RATE: 18 BRPM | DIASTOLIC BLOOD PRESSURE: 67 MMHG | OXYGEN SATURATION: 97 % | HEART RATE: 73 BPM

## 2020-05-11 DIAGNOSIS — I10 ESSENTIAL HYPERTENSION: Primary | ICD-10-CM

## 2020-05-11 DIAGNOSIS — R53.83 FATIGUE, UNSPECIFIED TYPE: ICD-10-CM

## 2020-05-11 LAB
GLUCOSE BLD STRIP.AUTO-MCNC: 118 MG/DL (ref 65–100)
SERVICE CMNT-IMP: ABNORMAL

## 2020-05-11 PROCEDURE — 74011250636 HC RX REV CODE- 250/636: Performed by: INTERNAL MEDICINE

## 2020-05-11 PROCEDURE — 74011250637 HC RX REV CODE- 250/637: Performed by: INTERNAL MEDICINE

## 2020-05-11 PROCEDURE — 94760 N-INVAS EAR/PLS OXIMETRY 1: CPT

## 2020-05-11 PROCEDURE — 82962 GLUCOSE BLOOD TEST: CPT

## 2020-05-11 PROCEDURE — 97530 THERAPEUTIC ACTIVITIES: CPT

## 2020-05-11 PROCEDURE — 74011250637 HC RX REV CODE- 250/637: Performed by: HOSPITALIST

## 2020-05-11 RX ORDER — METOPROLOL SUCCINATE 50 MG/1
50 TABLET, EXTENDED RELEASE ORAL
Qty: 30 TAB | Refills: 0 | Status: SHIPPED
Start: 2020-05-11 | End: 2020-06-10

## 2020-05-11 RX ORDER — POLYETHYLENE GLYCOL 3350 17 G/17G
17 POWDER, FOR SOLUTION ORAL DAILY
Qty: 30 PACKET | Refills: 0 | Status: SHIPPED
Start: 2020-05-12 | End: 2020-06-11

## 2020-05-11 RX ORDER — ARIPIPRAZOLE 15 MG/1
15 TABLET ORAL DAILY
Qty: 30 TAB | Refills: 0 | Status: SHIPPED | OUTPATIENT
Start: 2020-05-12 | End: 2020-06-11

## 2020-05-11 RX ORDER — HYDRALAZINE HYDROCHLORIDE 25 MG/1
75 TABLET, FILM COATED ORAL 3 TIMES DAILY
Qty: 270 TAB | Refills: 0 | Status: SHIPPED
Start: 2020-05-11 | End: 2020-06-10

## 2020-05-11 RX ADMIN — HEPARIN SODIUM 5000 UNITS: 5000 INJECTION INTRAVENOUS; SUBCUTANEOUS at 03:10

## 2020-05-11 RX ADMIN — POLYETHYLENE GLYCOL 3350 17 G: 17 POWDER, FOR SOLUTION ORAL at 10:48

## 2020-05-11 RX ADMIN — ARIPIPRAZOLE 15 MG: 10 TABLET ORAL at 10:48

## 2020-05-11 RX ADMIN — VITAM B12 100 MCG: 100 TAB at 10:48

## 2020-05-11 RX ADMIN — ASPIRIN 81 MG: 81 TABLET, COATED ORAL at 10:48

## 2020-05-11 RX ADMIN — HEPARIN SODIUM 5000 UNITS: 5000 INJECTION INTRAVENOUS; SUBCUTANEOUS at 10:49

## 2020-05-11 RX ADMIN — DULOXETINE HYDROCHLORIDE 120 MG: 60 CAPSULE, DELAYED RELEASE PELLETS ORAL at 10:49

## 2020-05-11 RX ADMIN — PANTOPRAZOLE SODIUM 40 MG: 40 TABLET, DELAYED RELEASE ORAL at 07:20

## 2020-05-11 RX ADMIN — SENNOSIDES AND DOCUSATE SODIUM 1 TABLET: 8.6; 5 TABLET ORAL at 10:48

## 2020-05-11 RX ADMIN — CLOPIDOGREL BISULFATE 75 MG: 75 TABLET ORAL at 10:49

## 2020-05-11 RX ADMIN — HYDRALAZINE HYDROCHLORIDE 75 MG: 50 TABLET, FILM COATED ORAL at 10:50

## 2020-05-11 RX ADMIN — CARBIDOPA AND LEVODOPA 2 TABLET: 25; 100 TABLET ORAL at 14:14

## 2020-05-11 RX ADMIN — Medication 1 DROP: at 07:20

## 2020-05-11 RX ADMIN — CARBIDOPA AND LEVODOPA 2 TABLET: 25; 100 TABLET ORAL at 10:49

## 2020-05-11 RX ADMIN — DIAZEPAM 2 MG: 2 TABLET ORAL at 10:49

## 2020-05-11 NOTE — PROGRESS NOTES
[facility]  Hospitalist Group                                                                                          Hospitalist Progress Note  Darnell Barnes MD  Answering service: [phone] or [phone] from in house phone        Date of Service:  2020  NAME:  Camelia Ma  :  1940  MRN:  [id number]      Admission Summary:     Patient was in her usual state of health until a couple of weeks ago when the patient developed weakness.  The patient described the weakness as generalized weakness, progressive and getting worse.  The patient is unable to walk.  The patient gets meal delivery by [employer] once a day.  The patient stated that she has no appetite and she has not been drinking. Richie Stevens also complained of cough, which is nonproductive, not associated with fever, rigors, or chills.  As a result of the weakness, the patient fell at home a couple of times.  The last fall was about 2 weeks ago.  Complaining of generalized body aches and pain as well as discomfort in her neck.  The patient was last admitted to this hospital from 2019 to 2019.  The patient was admitted and treated for acute CVA.  The patient has left carotid artery stenosis, underwent endarterectomy in the past.  According to the patient, the left carotid artery is now blocked again and the patient is awaiting revascularization of the left carotid artery.  The procedure was postponed because of the COVID-19 pandemic.  When the patient arrived at the emergency room, chest x-ray was obtained.  The chest x-ray was without any acute findings.  The patient was not able to ambulate, was referred to the hospitalist service for evaluation for admission. Interval history / Subjective: Follow up Generalized weakness  Patient seen and examined by the bedside, Labs, images and notes reviewed  Pending placement, COVID repeat for facility negative  Discussed with nursing staff, orders reviewed. Plan discussed with patient/Family       Assessment & Plan:     Generalized weakness  -Status post mechanical fall from weakness  -Secondary to progression of Parkinson  -Initial head CT without acute abnormality, MRI of the brain negative for CVA, MRI of the C-spine shows spondylosis with mild spinal stenosis at the remaining to mild cord compression most pronounced at C4-5. No significant change since 7/2019  -PT following the patient. Patient now agreeable to SNF  -Pending SNF placement    Parkinson's disease  -continue Sinemet    Coronary artery disease  -With elevated troponin upon admission with EKG changes  -Cardiology evaluating the patient  -Stress test shows negative for ischemia    Acute kidney injury  -Patient has baseline CKD  -Improving with IV fluid, at baseline now    Hypertension  -Blood pressure improving, continue monitor, increasing Hydralazine dose    Atrial fibrillation  -Paroxysmal atrial fibrillation  -Currently in sinus rhythm  -Patient is not a candidate for anticoagulation per cardiology    Carotic artery stenosis  -Plan for outpatient surgery    Dyslipidemia  -Statin    Anxiety and depression  -Stable    Anemia  -Anemia of chronic kidney disease  -H&H Stable    Code status: FULL  DVT prophylaxis: Heparin    Care Plan discussed with: Patient/Family and Nurse  Anticipated Disposition: SNF/LTC  Anticipated Discharge: 24 - 48 hr     Hospital Problems  Date Reviewed: 5/1/2020          Codes Class Noted POA    Weakness ICD-10-CM: R53.1  ICD-9-CM: 780.79  5/4/2020 Unknown        * (Principal) Generalized weakness ICD-10-CM: R53.1  ICD-9-CM: 780.79  4/30/2020 Yes                Review of Systems:   A comprehensive review of systems was negative except for that written in the HPI. Vital Signs:    Last 24hrs VS reviewed since prior progress note.  Most recent are:  Visit Vitals  /55   Pulse 62   Temp 97.5 °F (36.4 °C)   Resp 20   Ht 5' 5\" (1.651 m)   Wt 79.6 kg (175 lb 7.8 oz)   SpO2 97%   BMI 29.20 kg/m²       No intake or output data in the 24 hours ending 05/11/20 1018     Physical Examination:             Constitutional:  No acute distress, cooperative, pleasant    ENT:  Oral mucosa moist, oropharynx benign. Resp:  CTA bilaterally. No wheezing/rhonchi/rales. No accessory muscle use   CV:  Regular rhythm, normal rate, no murmurs, gallops, rubs    GI:  Soft, non distended, non tender. normoactive bowel sounds, no hepatosplenomegaly     Musculoskeletal:  No edema, warm, 2+ pulses throughout    Neurologic:  Moves all extremities. AAOx3, CN II-XII reviewed     Skin:  Good turgor, no rashes or ulcers       Data Review:    Review and/or order of clinical lab test      Labs:     Recent Labs     05/10/20  0321   WBC 4.8   HGB 10.2*   HCT 31.6*        Recent Labs     05/10/20  0321      K 4.0      CO2 25   BUN 25*   CREA 1.25*   *   CA 10.0     No results for input(s): SGOT, GPT, ALT, AP, TBIL, TBILI, TP, ALB, GLOB, GGT, AML, LPSE in the last 72 hours. No lab exists for component: AMYP, HLPSE  No results for input(s): INR, PTP, APTT, INREXT, INREXT in the last 72 hours. No results for input(s): FE, TIBC, PSAT, FERR in the last 72 hours. No results found for: FOL, RBCF   No results for input(s): PH, PCO2, PO2 in the last 72 hours. No results for input(s): CPK, CKNDX, TROIQ in the last 72 hours.     No lab exists for component: CPKMB  Lab Results   Component Value Date/Time    Cholesterol, total 129 05/01/2020 06:37 AM    HDL Cholesterol 36 05/01/2020 06:37 AM    LDL, calculated 74.6 05/01/2020 06:37 AM    Triglyceride 92 05/01/2020 06:37 AM    CHOL/HDL Ratio 3.6 05/01/2020 06:37 AM     Lab Results   Component Value Date/Time    Glucose (POC) 139 (H) 07/26/2019 04:34 PM    Glucose (POC) 126 (H) 07/26/2019 11:01 AM    Glucose (POC) 94 07/12/2019 05:40 PM    Glucose (POC) 135 (H) 02/17/2015 06:46 AM    Glucose (POC) 129 (H) 02/16/2015 09:58 PM     Lab Results Component Value Date/Time    Color DARK YELLOW 04/30/2020 07:57 PM    Appearance CLEAR 04/30/2020 07:57 PM    Specific gravity 1.025 04/30/2020 07:57 PM    pH (UA) 5.0 04/30/2020 07:57 PM    Protein 100 (A) 04/30/2020 07:57 PM    Glucose Negative 04/30/2020 07:57 PM    Ketone 15 (A) 04/30/2020 07:57 PM    Bilirubin NEGATIVE  07/11/2019 10:17 AM    Urobilinogen 1.0 04/30/2020 07:57 PM    Nitrites Negative 04/30/2020 07:57 PM    Leukocyte Esterase Negative 04/30/2020 07:57 PM    Epithelial cells FEW 04/30/2020 07:57 PM    Bacteria Negative 04/30/2020 07:57 PM    WBC 0-4 04/30/2020 07:57 PM    RBC 0-5 04/30/2020 07:57 PM         Medications Reviewed:     Current Facility-Administered Medications   Medication Dose Route Frequency    polyvinyl alcohol-povidone (NATURAL TEARS) 0.5-0.6 % ophthalmic solution 1 Drop  1 Drop Both Eyes BID    hydrALAZINE (APRESOLINE) tablet 75 mg  75 mg Oral TID    metoprolol succinate (TOPROL-XL) XL tablet 50 mg  50 mg Oral QHS    senna-docusate (PERICOLACE) 8.6-50 mg per tablet 1 Tab  1 Tab Oral DAILY    polyethylene glycol (MIRALAX) packet 17 g  17 g Oral DAILY    ARIPiprazole (ABILIFY) tablet 15 mg  15 mg Oral DAILY    aspirin delayed-release tablet 81 mg  81 mg Oral DAILY    atorvastatin (LIPITOR) tablet 40 mg  40 mg Oral QHS    carbidopa-levodopa (SINEMET)  mg per tablet 2 Tab  2 Tab Oral QID    clopidogreL (PLAVIX) tablet 75 mg  75 mg Oral DAILY AFTER BREAKFAST    cyanocobalamin (VITAMIN B12) tablet 100 mcg  100 mcg Oral DAILY    diazePAM (VALIUM) tablet 2 mg  2 mg Oral TID    DULoxetine (CYMBALTA) capsule 120 mg  120 mg Oral DAILY    pantoprazole (PROTONIX) tablet 40 mg  40 mg Oral ACB    nitroglycerin (NITROSTAT) tablet 0.4 mg  0.4 mg SubLINGual Q5MIN PRN    sodium chloride (NS) flush 5-40 mL  5-40 mL IntraVENous Q8H    sodium chloride (NS) flush 5-40 mL  5-40 mL IntraVENous PRN    ondansetron (ZOFRAN) injection 4 mg  4 mg IntraVENous Q4H PRN    bisacodyL (DULCOLAX) tablet 5 mg  5 mg Oral DAILY PRN    heparin (porcine) injection 5,000 Units  5,000 Units SubCUTAneous Q8H    acetaminophen (TYLENOL) tablet 650 mg  650 mg Oral Q4H PRN    lactated Ringers infusion  75 mL/hr IntraVENous CONTINUOUS     ______________________________________________________________________  EXPECTED LENGTH OF STAY: 3d 21h  ACTUAL LENGTH OF STAY:          11                 Margareth Damon MD

## 2020-05-11 NOTE — PROGRESS NOTES
ANDREA: Discharge to [facility] today. Ellender Lobe has been waived per facility. [employer] wheelchair van set for 2:15 PM. Copy of UAI sent with patient. Discharge orders pending. Chart reviewed. Referral is pending with [facility]. CM called [facility] [phone] and spoke with Manish Miller. They need COVID results faxed to [phone]. CM sent fax. Waiting on their corporate to review referral.    CM received call from Manish Miller at the [facility]. They can accept patient this afternoon. CM sent perfect serve to MD to request discharge orders. CM met with patient to discuss discharge/transport. Patient is concerned about getting a bill for an ambulance. She does not have any family or friends that can transport her. CM asked patient about contacts listed in the chart. Patient stated these are contacts are outdated and she does not have anyone. CM will schedule wheelchair Elmariluza [facility]bar[facility] care management for the trip. Discharge folder located on hard chart. RN to follow with Kardex, [facility], discharge papers, and call report to #[phone]. Transition of Care Plan to SNF/Rehab    SNF/Rehab Transition:  Patient has been accepted to [facility] and meets criteria for admission. Patient will transported by wheelchair Elonda Albarran  and expected to leave at 2:15 PM.     Communication to Patient/Family:  Met with patient and they are agreeable to the transition plan. Communication to SNF/Rehab:  Bedside RN, Lorri, has been notified to update the transition plan to the facility and call report (phone number  #). Discharge information has been updated on the AVS.     Discharge instructions to be fax'd to facility at [facility] # [phone]). Nursing Please include all hard scripts for controlled substances, med rec and dc summary, and AVS in packet.      Reviewed and confirmed with facility, [facility], can manage the patient care needs for the following:     SNF/Rehab Transition:  Patient to follow-up with Home Health: [facility], other  ,none)  PCP/Specialist: follow-up after SNF    Dale Rodgers with (X) only those applicable:    Medication:  [x]  Medications will be available at the facility  []  IV Antibiotics  []  Controlled Substance - hard copy to be sent with patient   []  Weekly Labs   Documents:  [x] Hard RX  [x] MAR  [x] Kardex  [x] AVS  [x]Transfer Summary  [x]Discharge   Equipment:  []  CPAP/BiPAP  []  Wound Vacuum  []  Sue or Urinary Device  []  PICC/Central Line  []  Nebulizer  []  Ventilator   Treatment:  []Isolation (for MRSA, VRE, etc.)  []Surgical Drain Management  []Tracheostomy Care  []Dressing Changes  []Dialysis with transportation and chair time   []PEG Care  []Oxygen  []Daily Weights for Heart Failure   Dietary:  []Any diet limitations  []Tube Feedings   []Total Parenteral Management (TPN)   Eligible for Medicaid Long Term Services and Supports  Yes:  [] Eligible for medical assistance or will become eligible within 180 days and UAI completed. [] Provider/Patient and/or support system has requested screening. [x] UAI copy provided to patient or responsible party,  [] UAI unavailable at discharge will send once processed to SNF provider. [] UAI unavailable at discharged mailed to patient  No:   [] Private pay and is not financially eligible for Medicaid within the next 180 days. [] Reside out-of-state.   [] A residents of a state owned/operated facility that is licensed  by [address] and [facility] or [facility]  [] Enrollment in [facility] hospice services  [] [address] [place] [address]  [] Patient /Family declines to have screening completed or provide financial information for screening     Financial Resources:  Medicaid    [] Initiated and application pending   [] Full coverage     Advanced Care Plan:  []Surrogate Decision Maker of Care  []POA  []Communicated Code Status (DDNR\", \"Full\")    Other  Copy of UAI placed in discharge folder   IM letter discussed with patient.        BRITTNEY Garcia/CRM

## 2020-05-11 NOTE — ROUTINE PROCESS
I have reviewed discharge instructions with the patient. The patient verbalized understanding. The patient was taken down to the patient discharge area by wheelchair Elizabeth Carroll. Report was given to [facility] Sent discharge paperwork, sbar, mar

## 2020-05-11 NOTE — DISCHARGE SUMMARY
Discharge Summary       PATIENT ID: Camelia Ma  MRN: [id number]   YOB: 1940    DATE OF ADMISSION: 4/30/2020  6:58 PM    DATE OF DISCHARGE: 05/11/20    PRIMARY CARE PROVIDER: Maria Elena Pepper DO     ATTENDING PHYSICIAN: Darnell Barnes MD    DISCHARGING PROVIDER: Darnell Barnes MD    To contact this individual call [phone] and ask the  to page. If unavailable ask to be transferred the Adult Hospitalist Department.     CONSULTATIONS: IP CONSULT TO CARDIOLOGY  IP CONSULT TO NEUROLOGY    PROCEDURES/SURGERIES: * No surgery found *    [address] [facility] COURSE:       Patient was in her usual state of health until a couple of weeks ago when the patient developed weakness.  The patient described the weakness as generalized weakness, progressive and getting worse.  The patient is unable to walk.  The patient gets meal delivery by [employer] once a day.  The patient stated that she has no appetite and she has not been drinking. Richie Stevens also complained of cough, which is nonproductive, not associated with fever, rigors, or chills.  As a result of the weakness, the patient fell at home a couple of times.  The last fall was about 2 weeks ago.  Complaining of generalized body aches and pain as well as discomfort in her neck.  The patient was last admitted to this hospital from 07/11/2019 to 07/18/2019.  The patient was admitted and treated for acute CVA.  The patient has left carotid artery stenosis, underwent endarterectomy in the past.  According to the patient, the left carotid artery is now blocked again and the patient is awaiting revascularization of the left carotid artery.  The procedure was postponed because of the COVID-19 pandemic.  When the patient arrived at the emergency room, chest x-ray was obtained.  The chest x-ray was without any acute findings.  The patient was not able to ambulate, was referred to the hospitalist service for evaluation for admission    Generalized weakness  -Status post mechanical fall from weakness  -Secondary to progression of Parkinson  -Initial head CT without acute abnormality, MRI of the brain negative for CVA, MRI of the C-spine shows spondylosis with mild spinal stenosis at the remaining to mild cord compression most pronounced at C4-5. No significant change since 7/2019  -PT following the patient. Patient now agreeable to SNF  - SNF placement today, pt deemed fit for discharge today     Parkinson's disease  -continue Sinemet     Coronary artery disease  -With elevated troponin upon admission with EKG changes  -Cardiology evaluating the patient  -Stress test shows negative for ischemia     Acute kidney injury  -Patient has baseline CKD  -Improving with IV fluid, at baseline now     Hypertension  -Blood pressure improving, continue monitor, increasing Hydralazine dose     Atrial fibrillation  -Paroxysmal atrial fibrillation  -Currently in sinus rhythm  -Patient is not a candidate for anticoagulation per cardiology     Carotic artery stenosis  -Plan for outpatient surgery     Dyslipidemia  -Statin     Anxiety and depression  -Stable     Anemia  -Anemia of chronic kidney disease  -H&H Stable       PENDING TEST RESULTS:   At the time of discharge the following test results are still pending: none    FOLLOW UP APPOINTMENTS:    Follow-up Information     Follow up With Specialties Details Why Partha Johnson MD Cardiology On 5/18/2020 1:30 PM.  PHONECALL visit with Dr. Selam Otero.    [address] [facility]      Karyle Bonus, [place]   [address] [facility] [address] [facility]      Karyle Bonus, [place] In 1 week  [address]  [address] Yareli Arvizu MD Cardiology In 2 weeks  [address]  [phone]             ADDITIONAL CARE RECOMMENDATIONS:   · It is important that you take the medication exactly as they are prescribed. · Keep your medication in the bottles provided by the pharmacist and keep a list of the medication names, dosages, and times to be taken in your wallet. · Do not take other medications without consulting your doctor. · No drinking alcohol or driving car or operating machinery if you are on narcotic pain medications. Donot take sedating mediations if you are sleepy or confused. · Fall Precautions  · Keep Well Hydrated  · Report to your medical provider if you feel you have  developed allergies to medications  · Follow up with your PCP or Consultant for medication adjustments and refills  · Monitor for signs of fevers,chills,bleeding,chest pain and seek medical attention if you do so. ·          DIET: cardiac Diet      ACTIVITY: Activity as tolerated    WOUND CARE: none    EQUIPMENT needed: none        DISCHARGE MEDICATIONS:  Current Discharge Medication List      START taking these medications    Details   metoprolol succinate (TOPROL-XL) 50 mg XL tablet Take 1 Tab by mouth nightly for 30 days. Qty: 30 Tab, Refills: 0      polyethylene glycol (MIRALAX) 17 gram packet Take 1 Packet by mouth daily for 30 days. Qty: 30 Packet, Refills: 0         CONTINUE these medications which have CHANGED    Details   ARIPiprazole (Abilify) 15 mg tablet Take 1 Tab by mouth daily for 30 days. Indications: additional medications to treat depression  Qty: 30 Tab, Refills: 0      hydrALAZINE (APRESOLINE) 25 mg tablet Take 3 Tabs by mouth three (3) times daily for 30 days. Qty: 270 Tab, Refills: 0         CONTINUE these medications which have NOT CHANGED    Details   aspirin delayed-release (Ecotrin Low Strength) 81 mg tablet Take 81 mg by mouth nightly. clopidogreL (Plavix) 75 mg tab Take 75 mg by mouth daily (after breakfast). carbidopa-levodopa (SINEMET)  mg per tablet Take 2 Tabs by mouth four (4) times daily.   Qty: 720 Tab, Refills: 1 diazePAM (VALIUM) 2 mg tablet Take 2 mg by mouth three (3) times daily. Indications: CHRONIC PAIN      cyanocobalamin (VITAMIN B12) 100 mcg tablet Take 100 mcg by mouth daily. senna-docusate (SENNA WITH DOCUSATE SODIUM) 8.6-50 mg per tablet Take 1 Tab by mouth nightly. Cholecalciferol, Vitamin D3, 1,000 unit cap Take 1,000 Units by mouth daily. DULoxetine (CYMBALTA) 60 mg capsule Take 120 mg by mouth daily. Indications: Anxiousness associated with Depression      multivitamins-minerals-lutein (CENTRUM SILVER) tab tablet Take 1 Tab by mouth daily. atorvastatin (LIPITOR) 40 mg tablet Take 40 mg by mouth nightly. nitroglycerin (Nitrostat) 0.4 mg SL tablet 0.4 mg by SubLINGual route every five (5) minutes as needed for Chest Pain. Up to 3 doses. acetaminophen (TYLENOL) 325 mg tablet Take 650 mg by mouth every six (6) hours as needed for Pain or Fever. vit C,Y-Np-krrgz-lutein-zeaxan (PRESERVISION AREDS-2) 171-013-63-1 mg-unit-mg-mg cap capsule Take 1 Cap by mouth two (2) times a day. pantoprazole (PROTONIX) 40 mg tablet Take 40 mg by mouth Daily (before breakfast).  Indications: h/o bleeding ulcer with nsaid         STOP taking these medications       carvediloL (Coreg) 12.5 mg tablet Comments:   Reason for Stopping:         triamcinolone (Nasacort) 55 mcg nasal inhaler Comments:   Reason for Stopping:         zolpidem (AMBIEN) 10 mg tablet Comments:   Reason for Stopping:         hydrOXYzine pamoate (VISTARIL) 50 mg capsule Comments:   Reason for Stopping:         nabumetone (Relafen DS) 1,000 mg tab Comments:   Reason for Stopping:         cilostazol (PLETAL PO) Comments:   Reason for Stopping:         carvedilol (COREG) 12.5 mg tablet Comments:   Reason for Stopping:         methocarbamol (ROBAXIN) 750 mg tablet Comments:   Reason for Stopping:         LACTOBACILLUS RHAMNOSUS GG (CULTURELLE PO) Comments:   Reason for Stopping:                 NOTIFY [address] THE FOLLOWING:   Fever over 101 degrees for 24 hours. Chest pain, shortness of breath, fever, chills, nausea, vomiting, diarrhea, change in mentation, falling, weakness, bleeding. Severe pain or pain not relieved by medications. Or, any other signs or symptoms that you may have questions about. DISPOSITION:    Home With:   OT  PT  HH  RN      x Long term SNF/Inpatient Rehab    Independent/assisted living    Hospice    Other:       PATIENT CONDITION AT DISCHARGE:     Functional status    Poor     Deconditioned     Independent      Cognition     Lucid     Forgetful     Dementia      Catheters/lines (plus indication)    Sue     PICC     PEG     None      Code status    x Full code     DNR      PHYSICAL EXAMINATION AT DISCHARGE:  General:          Alert, cooperative, no distress, appears stated age. HEENT:           Atraumatic, anicteric sclerae, pink conjunctivae                          No oral ulcers, mucosa moist, throat clear, dentition fair  Neck:               Supple, symmetrical  Lungs:             Clear to auscultation bilaterally. No Wheezing or Rhonchi. No rales. Chest wall:      No tenderness  No Accessory muscle use. Heart:              Regular  rhythm,  No  murmur   No edema  Abdomen:        Soft, non-tender. Not distended. Bowel sounds normal  Extremities:     No cyanosis. No clubbing,                            Skin turgor normal, Capillary refill normal  Skin:                Not pale. Not Jaundiced  No rashes   Psych:             Not anxious or agitated.   Neurologic:      Alert, moves all extremities, answers questions appropriately and responds to commands       CHRONIC MEDICAL DIAGNOSES:  Problem List as of 5/11/2020 Date Reviewed: 5/1/2020          Codes Class Noted - Resolved    Weakness ICD-10-CM: R53.1  ICD-9-CM: 780.79  5/4/2020 - Present        * (Principal) Generalized weakness ICD-10-CM: R53.1  ICD-9-CM: 780.79  4/30/2020 - Present        Carotid artery stenosis, symptomatic, left ICD-10-CM: Z70.48  ICD-9-CM: 433.10  7/26/2019 - Present        HTN (hypertension) ICD-10-CM: I10  ICD-9-CM: 401.9  7/17/2019 - Present        Acute ischemic stroke [facility]) ICD-10-CM: I63.9  ICD-9-CM: 434.91  7/12/2019 - Present        Fall ICD-10-CM: W19. Josue   ICD-9-CM: E888.9  12/24/2018 - Present        CKD (chronic kidney disease), stage III ([facility] ICD-10-CM: N18.3  ICD-9-CM: 585.3  7/8/2015 - Present        Edema ICD-10-CM: R60.9  ICD-9-CM: 782.3  5/6/2015 - Present        Diabetes mellitus ([facility] ICD-10-CM: E11.9  ICD-9-CM: 250.00  5/6/2015 - Present        Postoperative anemia due to acute blood loss ICD-10-CM: D62  ICD-9-CM: 285.1  2/14/2015 - Present        S/P CABG (coronary artery bypass graft) ICD-10-CM: Z95.1  ICD-9-CM: V45.81  2/13/2015 - Present        Syncope ICD-10-CM: R55  ICD-9-CM: 780.2  2/9/2015 - Present        Bradycardia ICD-10-CM: R00.1  ICD-9-CM: 427.89  2/9/2015 - Present        MORENO (acute kidney injury) ([facility] ICD-10-CM: N17.9  ICD-9-CM: 584.9  2/9/2015 - Present        Anemia ICD-10-CM: D64.9  ICD-9-CM: 285.9  9/9/2014 - Present        GI bleed ICD-10-CM: K92.2  ICD-9-CM: 578.9  9/9/2014 - Present        AF (atrial fibrillation) (HCC) ICD-10-CM: I48.91  ICD-9-CM: 427.31  6/20/2014 - Present        Hypotension ICD-10-CM: I95.9  ICD-9-CM: 458.9  6/3/2014 - Present        CAD (coronary artery disease) ICD-10-CM: I25.10  ICD-9-CM: 414.00  6/3/2014 - Present    Overview Signed 2/10/2015 11:06 AM by Austin Batista     2007 PCI x2 to LAD with STEPHAN             S/P coronary artery stent placement ICD-10-CM: Z95.5  ICD-9-CM: V45.82  6/3/2014 - Present        Renal failure ICD-10-CM: N19  ICD-9-CM: 594  6/3/2014 - Present        Elevated troponin ICD-10-CM: R79.89  ICD-9-CM: 790.6  6/3/2014 - Present        Pericardial effusion ICD-10-CM: I31.3  ICD-9-CM: 423.9  6/3/2014 - Present        Lactic acidosis ICD-10-CM: E87.2  ICD-9-CM: 276.2  6/3/2014 - Present              Greater than 45  minutes were spent with the patient on counseling and coordination of care    Signed:   Nancy Chávez MD  5/11/2020  12:03 PM

## 2020-05-11 NOTE — TELEPHONE ENCOUNTER
Patient needs a heart monitor mailed to her -per Yann Luque NP. Dr. Cyn Giraldo stated a 30 day event monitor is fine. Waiting to see patient has been discharged before ordering or sending message to have it mailed.

## 2020-05-11 NOTE — PROGRESS NOTES
Problem: Mobility Impaired (Adult and Pediatric)  Goal: *Acute Goals and Plan of Care (Insert Text)  Description: FUNCTIONAL STATUS PRIOR TO ADMISSION: Patient was modified independent using a rollator for functional mobility. Pt reports increasing difficulty with ambulation secondary to pain and fatigue - maximal effort to ambulate. HOME SUPPORT PRIOR TO ADMISSION: The patient lived in [place] [address] - plans to discharge to assisted living. Physical Therapy Goals  Initiated 5/4/2020, re-assessed 5/11/2020 and goals remain appropriate  1. Patient will move from supine to sit and sit to supine , scoot up and down and roll side to side in bed with minimal assistance/contact guard assist within 7 day(s). 2.  Patient will transfer from bed to chair and chair to bed with minimal assistance/contact guard assist using the least restrictive device within 7 day(s). 3.  Patient will perform sit to stand with supervision/set-up within 7 day(s). 4.  Patient will ambulate with minimal assistance/contact guard assist for 20 feet with the least restrictive device within 7 day(s). Outcome: Progressing Towards Goal   PHYSICAL THERAPY TREATMENT: WEEKLY REASSESSMENT  Patient: Carlene Posada (5 y.o. female)  Date: 5/11/2020  Primary Diagnosis: Generalized weakness [R53.1]  Weakness [R53.1]        Precautions:          ASSESSMENT  Patient continues with skilled PT services and is slowly progressing towards goals. Pt performed transfers with Min A and additional time. Improved upright posture and no pain reported today. Pt was able to ambulate 5ft prior to requesting rest break and deferring further mobility. Participated in seated exercises and ADLs. Pt remains appropriate for discharge to SNF.      Patient's progression toward goals since last assessment: slow progress, less pain, improved ambulation    Current Level of Function Impacting Discharge (mobility/balance): Min A for all mobility    Other factors to consider for discharge: Was in ILF PTA         PLAN :  Goals have been updated based on progression since last assessment. Patient continues to benefit from skilled intervention to address the above impairments. Recommendations and Planned Interventions: bed mobility training, transfer training, gait training, therapeutic exercises, neuromuscular re-education, patient and family training/education, and therapeutic activities      Frequency/Duration: Patient will be followed by physical therapy:  5 times a week to address goals. Recommendation for discharge: (in order for the patient to meet his/her long term goals)  Therapy up to 5 days/week in SNF setting    This discharge recommendation:  Has been made in collaboration with the attending provider and/or case management    IF patient discharges home will need the following DME: patient owns DME required for discharge         SUBJECTIVE:   Patient stated You are going to leave me here?     OBJECTIVE DATA SUMMARY:   HISTORY:    Past Medical History:   Diagnosis Date    Anxiety disorder     Atrial fibrillation (HCC)     CAD (coronary artery disease) 2007    stents, CABG x 3v    Carotid stenosis     Cervical stenosis of spinal canal 07/2019    Chronic kidney disease     Cough     CVA (cerebral vascular accident) ([address] 07/2019    left lacunar infarct at head of caudate    Depression     AND CHRONIC ANXIETY    Diabetes ([address]     GERD (gastroesophageal reflux disease)     High cholesterol     History of peptic ulcer     Bleeding ulcer with increased NSAID use    Hypertension     Left carotid stenosis 07/2019    s/p left CEA with Dr. Dusty Alves    MI, old 2007    PUD (peptic ulcer disease)     Stroke ([facility]     Tremor     Valvular heart disease      Past Surgical History:   Procedure Laterality Date    CARDIAC SURG PROCEDURE UNLIST      CABG X3 VESSEL    HX CAROTID ENDARTERECTOMY  07/2019    HX CORONARY ARTERY BYPASS GRAFT      HX TONSILLECTOMY  1963 TOTAL KNEE ARTHROPLASTY Right 2015       Personal factors and/or comorbidities impacting plan of care: anxiety, A-fib, CAD, CVA    Home Situation  Home Environment: Independent living([facility])  # Steps to Enter: 0  One/Two Story Residence: One story  Living Alone: Yes  Support Systems: None  Patient Expects to be Discharged to[de-identified] Assisted living  Current DME Used/Available at Home: Charna Angela, rolling, Shower chair  Tub or Shower Type: Shower    EXAMINATION/PRESENTATION/DECISION MAKING:   Critical Behavior:  Neurologic State: Alert, Appropriate for age  Orientation Level: Appropriate for age, Oriented X4  Cognition: Appropriate decision making, Appropriate for age attention/concentration, Appropriate safety awareness, Follows commands  Safety/Judgement: Good awareness of safety precautions  Hearing:   Auditory  Auditory Impairment: None  Skin:    Edema:   Range Of Motion:  AROM: Generally decreased, functional           PROM: Generally decreased, functional           Strength:    Strength: Generally decreased, functional                    Tone & Sensation:                                  Coordination:  Coordination: Generally decreased, functional  Vision:      Functional Mobility:  Bed Mobility:  Rolling: Minimum assistance  Supine to Sit: Minimum assistance     Scooting: Minimum assistance  Transfers:  Sit to Stand: Minimum assistance;Assist x1  Stand to Sit: Minimum assistance  Stand Pivot Transfers: Minimum assistance                    Balance:   Sitting: Intact  Sitting - Static: Good (unsupported)  Sitting - Dynamic: Good (unsupported)  Standing: Impaired  Standing - Static: Fair  Standing - Dynamic : Fair  Ambulation/Gait Training:  Distance (ft): 5 Feet (ft)  Assistive Device: Gait belt;Walker, rolling  Ambulation - Level of Assistance: Minimal assistance;Assist x1        Gait Abnormalities: Decreased step clearance;Shuffling gait        Base of Support: Widened     Speed/Samira: Pace decreased (<100 feet/min); Slow;Shuffled  Step Length: Right shortened;Left shortened            Activity Tolerance:   Good  Please refer to the flowsheet for vital signs taken during this treatment. After treatment patient left in no apparent distress:   Sitting in chair and Call bell within reach    COMMUNICATION/EDUCATION:   The patients plan of care was discussed with: Registered nurse. Fall prevention education was provided and the patient/caregiver indicated understanding., Patient/family have participated as able in goal setting and plan of care. , and Patient/family agree to work toward stated goals and plan of care.     Thank you for this referral.  Johnathon Ngo, PT   Time Calculation: 25 mins

## 2020-05-11 NOTE — DISCHARGE INSTRUCTIONS
A 48 hour monitor has been ordered to be sent to your home to monitor your heart rhythm. Please apply as directed when it arrives. Contact Dr. Vladimir Dietrich office with any questions. Discharge Instructions       PATIENT ID: Alma De León  MRN: [id number]   YOB: 1940    DATE OF ADMISSION: 4/30/2020  6:58 PM    DATE OF DISCHARGE: 5/11/2020    PRIMARY CARE PROVIDER: Carmelita Paris DO     ATTENDING PHYSICIAN: Arron Valdivia MD  DISCHARGING PROVIDER: Addison Rae MD    To contact this individual call [phone] and ask the  to page. If unavailable ask to be transferred the Adult Hospitalist Department. DISCHARGE DIAGNOSES     Generalized weakness    CONSULTATIONS: IP CONSULT TO CARDIOLOGY  IP CONSULT TO NEUROLOGY    PROCEDURES/SURGERIES: * No surgery found *    PENDING TEST RESULTS:   At the time of discharge the following test results are still pending: none    FOLLOW UP APPOINTMENTS:   Follow-up Information     Follow up With Specialties Details Why Kamilah Herrera MD Cardiology On 5/18/2020 1:30 PM.  PHONECALL visit with Dr. Roverto Darden. [address] [facility]      Cely Ramirez   [address]  [address] [facility] [address] [facility]      Cely Ramirez In 1 week  [address]  [address] Tuan De La O MD Cardiology In 2 weeks  [place] [address]  [address] [facility]  [phone]             ADDITIONAL CARE RECOMMENDATIONS:   · It is important that you take the medication exactly as they are prescribed. · Keep your medication in the bottles provided by the pharmacist and keep a list of the medication names, dosages, and times to be taken in your wallet. · Do not take other medications without consulting your doctor. · No drinking alcohol or driving car or operating machinery if you are on narcotic pain medications.  Vik take sedating mediations if you are sleepy or confused. · Fall Precautions  · Keep Well Hydrated  · Report to your medical provider if you feel you have  developed allergies to medications  · Follow up with your PCP or Consultant for medication adjustments and refills  · Monitor for signs of fevers,chills,bleeding,chest pain and seek medical attention if you do so. ·          DIET: cardiac Diet      ACTIVITY: Activity as tolerated    WOUND CARE: none    EQUIPMENT needed: none      DISCHARGE MEDICATIONS:   See Medication Reconciliation Form    · It is important that you take the medication exactly as they are prescribed. · Keep your medication in the bottles provided by the pharmacist and keep a list of the medication names, dosages, and times to be taken in your wallet. · Do not take other medications without consulting your doctor. NOTIFY YOUR PHYSICIAN FOR ANY OF THE FOLLOWING:   Fever over 101 degrees for 24 hours. Chest pain, shortness of breath, fever, chills, nausea, vomiting, diarrhea, change in mentation, falling, weakness, bleeding. Severe pain or pain not relieved by medications. Or, any other signs or symptoms that you may have questions about.       DISPOSITION:   Home With:   OT  PT  HH  RN      x SNF/Inpatient Rehab/LTAC    Independent/assisted living    Hospice    Other:     CDMP Checked:   Yes x     PROBLEM LIST Updated:  Yes x       Signed:   Melissa Juares MD  5/11/2020  12:01 PM

## 2020-05-12 NOTE — TELEPHONE ENCOUNTER
Per Joselin Velez, patient was discharged today. Looks like she was discharged to a SNF. I am not sure how we would have event monitor sent to her if this is the case. Called patient. Left message on voicemail stating I was calling regarding a phone call visit we scheduled for Monday 5/18 and also a heart monitor Dr. Dom Wheat would like her to wear if possible. Requested call back to discuss.

## 2020-07-08 ENCOUNTER — TELEPHONE (OUTPATIENT)
Dept: NEUROLOGY | Age: 80
End: 2020-07-08

## 2020-07-08 DIAGNOSIS — R32 URINARY INCONTINENCE, UNSPECIFIED TYPE: ICD-10-CM

## 2020-07-08 DIAGNOSIS — R26.9 GAIT DISTURBANCE: Primary | ICD-10-CM

## 2020-07-08 DIAGNOSIS — R15.9 INCONTINENCE OF FECES, UNSPECIFIED FECAL INCONTINENCE TYPE: ICD-10-CM

## 2020-07-08 DIAGNOSIS — R29.2 HYPERREFLEXIA OF LOWER EXTREMITY: ICD-10-CM

## 2020-07-08 NOTE — TELEPHONE ENCOUNTER
She was at [facility] after \"too weak to get up from the commode\" sat there for 18 hours and is now at [facility]. She was at [facility] in May for generalized weakness that was attributed to worsening PD. Dr. Pranav Hedrick reports LE hyperreflexia, which is a definite change from my exam in Dec, 2019. She also reports new b/b incontinence. He also noted that she is on Abilify and questions if this could be causing parkinsonism. She was on 10mg at our appt in Nov, 2018 and then in Dec, 2019 the dose had been increased to 15mg. She has a h/o anxiety and depression listed in her chart. He is going to d/c Abilify. I ordered MRI T and L-spine. Messi Anderson - Please fax my notes from initial visit in Nov, 2018 and f/u Dec, 2019 to Dr. Pranav Hedrick at [place].  Fax #[phone]

## 2020-07-08 NOTE — TELEPHONE ENCOUNTER
Dr. José Antonio Payne sees pt at [facility] and he is calling to speak with Dr. Madan Martel re pt.     Please call around 3-4 or after 4:30

## 2020-08-05 ENCOUNTER — TELEPHONE (OUTPATIENT)
Dept: NEUROLOGY | Age: 80
End: 2020-08-05

## 2020-08-05 NOTE — TELEPHONE ENCOUNTER
Joselyn Martinez - Please call pt: Remind her to  rescheduled her MRI T and L-spine and give her the number. If you do not reach her on cell phone, she is admitted to [facility] at the current time. (I received notes from Dr. Kush Avila who is caring for the pt at [facility]. She was admitted to [address] [place] [address] 6/30/20-7/3/20 after being found on her toilet unable to get up for approx 18 hours. During her acute admission she had a Cr of 1.84 that responded to IV fluids and decreased to 1.26. Echo with mild to mod AI, dilated left atrium. Hgb 10. CPK, TSH, B12 were normal.  Admitted to [facility]. First note 7/6/20, last note is from 7/21/20 and mentions that pt asked staff to reschedule her MRIs b/c it was going to be too hot outside. She completed taper of Abilify on 7/15/20.  Notes sent to scanning.)

## 2020-08-11 NOTE — TELEPHONE ENCOUNTER
May Smith - Will you please call pt and/or staff at [facility] with number to call to schedule MRI of T and L spine. Thanks.

## 2020-08-12 NOTE — TELEPHONE ENCOUNTER
Patient is no longer at [facility], she stated while she was there, no one at [facility] was able to arrange for her to get to the MRI so it was not scheduled. She states \"I am having difficulty getting around as it is, can you ask Dr. Lyle Gordon if it is ok to hold on doing this right now? \"

## 2020-08-13 NOTE — TELEPHONE ENCOUNTER
Patient called, she stated she is having difficulty arranging transportation as she walks very slow and has to use a walker and she does not know when or how she would be able to have this MRI done.

## 2020-08-13 NOTE — TELEPHONE ENCOUNTER
Vargas Fore - Please call pt: It is up to her, but Dr. Gerald Mclean and I were concerned that her difficulties getting around may be due to a problem in her spine that needs attention. His exam was quite different from mine and her decline was not expected. I feel she really needs to have this imaging done.

## 2020-09-22 ENCOUNTER — APPOINTMENT (OUTPATIENT)
Dept: VASCULAR SURGERY | Age: 80
DRG: 291 | End: 2020-09-22
Attending: NURSE PRACTITIONER
Payer: MEDICARE

## 2020-09-22 ENCOUNTER — APPOINTMENT (OUTPATIENT)
Dept: GENERAL RADIOLOGY | Age: 80
DRG: 291 | End: 2020-09-22
Attending: STUDENT IN AN ORGANIZED HEALTH CARE EDUCATION/TRAINING PROGRAM
Payer: MEDICARE

## 2020-09-22 ENCOUNTER — HOSPITAL ENCOUNTER (INPATIENT)
Age: 80
LOS: 9 days | Discharge: NURSING, REHAB OR LONG-TERM CARE | DRG: 291 | End: 2020-10-04
Attending: STUDENT IN AN ORGANIZED HEALTH CARE EDUCATION/TRAINING PROGRAM | Admitting: INTERNAL MEDICINE
Payer: MEDICARE

## 2020-09-22 DIAGNOSIS — R60.0 EDEMA OF BOTH LOWER LEGS: Primary | ICD-10-CM

## 2020-09-22 DIAGNOSIS — I48.0 PAROXYSMAL ATRIAL FIBRILLATION (HCC): ICD-10-CM

## 2020-09-22 DIAGNOSIS — M79.604 PAIN IN BOTH LOWER EXTREMITIES: ICD-10-CM

## 2020-09-22 DIAGNOSIS — R26.2 UNABLE TO AMBULATE: ICD-10-CM

## 2020-09-22 DIAGNOSIS — I50.30 DIASTOLIC CONGESTIVE HEART FAILURE, UNSPECIFIED HF CHRONICITY (HCC): ICD-10-CM

## 2020-09-22 DIAGNOSIS — M79.605 PAIN IN BOTH LOWER EXTREMITIES: ICD-10-CM

## 2020-09-22 PROBLEM — I50.9 CHF (CONGESTIVE HEART FAILURE) (HCC): Status: ACTIVE | Noted: 2020-09-22

## 2020-09-22 LAB
ALBUMIN SERPL-MCNC: 3.7 G/DL (ref 3.5–5)
ALBUMIN/GLOB SERPL: 1.1 {RATIO} (ref 1.1–2.2)
ALP SERPL-CCNC: 143 U/L (ref 45–117)
ALT SERPL-CCNC: 10 U/L (ref 12–78)
ANION GAP SERPL CALC-SCNC: 6 MMOL/L (ref 5–15)
APPEARANCE UR: CLEAR
AST SERPL-CCNC: 19 U/L (ref 15–37)
BACTERIA URNS QL MICRO: NEGATIVE /HPF
BASOPHILS # BLD: 0 K/UL (ref 0–0.1)
BASOPHILS NFR BLD: 1 % (ref 0–1)
BILIRUB SERPL-MCNC: 0.5 MG/DL (ref 0.2–1)
BILIRUB UR QL: NEGATIVE
BNP SERPL-MCNC: 1561 PG/ML
BUN SERPL-MCNC: 21 MG/DL (ref 6–20)
BUN/CREAT SERPL: 15 (ref 12–20)
CALCIUM SERPL-MCNC: 10 MG/DL (ref 8.5–10.1)
CHLORIDE SERPL-SCNC: 107 MMOL/L (ref 97–108)
CO2 SERPL-SCNC: 25 MMOL/L (ref 21–32)
COLOR UR: ABNORMAL
COMMENT, HOLDF: NORMAL
CREAT SERPL-MCNC: 1.43 MG/DL (ref 0.55–1.02)
DIFFERENTIAL METHOD BLD: ABNORMAL
EOSINOPHIL # BLD: 0.2 K/UL (ref 0–0.4)
EOSINOPHIL NFR BLD: 4 % (ref 0–7)
EPITH CASTS URNS QL MICRO: ABNORMAL /LPF
ERYTHROCYTE [DISTWIDTH] IN BLOOD BY AUTOMATED COUNT: 13.6 % (ref 11.5–14.5)
GLOBULIN SER CALC-MCNC: 3.5 G/DL (ref 2–4)
GLUCOSE SERPL-MCNC: 97 MG/DL (ref 65–100)
GLUCOSE UR STRIP.AUTO-MCNC: NEGATIVE MG/DL
HCT VFR BLD AUTO: 33.7 % (ref 35–47)
HGB BLD-MCNC: 10.7 G/DL (ref 11.5–16)
HGB UR QL STRIP: NEGATIVE
HYALINE CASTS URNS QL MICRO: ABNORMAL /LPF (ref 0–5)
IMM GRANULOCYTES # BLD AUTO: 0 K/UL (ref 0–0.04)
IMM GRANULOCYTES NFR BLD AUTO: 0 % (ref 0–0.5)
KETONES UR QL STRIP.AUTO: NEGATIVE MG/DL
LEUKOCYTE ESTERASE UR QL STRIP.AUTO: ABNORMAL
LYMPHOCYTES # BLD: 1.1 K/UL (ref 0.8–3.5)
LYMPHOCYTES NFR BLD: 18 % (ref 12–49)
MCH RBC QN AUTO: 29.3 PG (ref 26–34)
MCHC RBC AUTO-ENTMCNC: 31.8 G/DL (ref 30–36.5)
MCV RBC AUTO: 92.3 FL (ref 80–99)
MONOCYTES # BLD: 0.6 K/UL (ref 0–1)
MONOCYTES NFR BLD: 10 % (ref 5–13)
NEUTS SEG # BLD: 4.1 K/UL (ref 1.8–8)
NEUTS SEG NFR BLD: 67 % (ref 32–75)
NITRITE UR QL STRIP.AUTO: NEGATIVE
NRBC # BLD: 0 K/UL (ref 0–0.01)
NRBC BLD-RTO: 0 PER 100 WBC
PH UR STRIP: 7 [PH] (ref 5–8)
PLATELET # BLD AUTO: 223 K/UL (ref 150–400)
PMV BLD AUTO: 8.7 FL (ref 8.9–12.9)
POTASSIUM SERPL-SCNC: 3.9 MMOL/L (ref 3.5–5.1)
PROT SERPL-MCNC: 7.2 G/DL (ref 6.4–8.2)
PROT UR STRIP-MCNC: 100 MG/DL
RBC # BLD AUTO: 3.65 M/UL (ref 3.8–5.2)
RBC #/AREA URNS HPF: ABNORMAL /HPF (ref 0–5)
SAMPLES BEING HELD,HOLD: NORMAL
SODIUM SERPL-SCNC: 138 MMOL/L (ref 136–145)
SP GR UR REFRACTOMETRY: 1.01 (ref 1–1.03)
TROPONIN I SERPL-MCNC: <0.05 NG/ML
UA: UC IF INDICATED,UAUC: ABNORMAL
UROBILINOGEN UR QL STRIP.AUTO: 0.2 EU/DL (ref 0.2–1)
WBC # BLD AUTO: 6.1 K/UL (ref 3.6–11)
WBC URNS QL MICRO: ABNORMAL /HPF (ref 0–4)

## 2020-09-22 PROCEDURE — 81001 URINALYSIS AUTO W/SCOPE: CPT

## 2020-09-22 PROCEDURE — 80053 COMPREHEN METABOLIC PANEL: CPT

## 2020-09-22 PROCEDURE — 71046 X-RAY EXAM CHEST 2 VIEWS: CPT

## 2020-09-22 PROCEDURE — 99285 EMERGENCY DEPT VISIT HI MDM: CPT

## 2020-09-22 PROCEDURE — 94761 N-INVAS EAR/PLS OXIMETRY MLT: CPT

## 2020-09-22 PROCEDURE — 36415 COLL VENOUS BLD VENIPUNCTURE: CPT

## 2020-09-22 PROCEDURE — 99218 HC RM OBSERVATION: CPT

## 2020-09-22 PROCEDURE — 93005 ELECTROCARDIOGRAM TRACING: CPT

## 2020-09-22 PROCEDURE — 84484 ASSAY OF TROPONIN QUANT: CPT

## 2020-09-22 PROCEDURE — 96375 TX/PRO/DX INJ NEW DRUG ADDON: CPT

## 2020-09-22 PROCEDURE — 74011250637 HC RX REV CODE- 250/637: Performed by: NURSE PRACTITIONER

## 2020-09-22 PROCEDURE — 93970 EXTREMITY STUDY: CPT

## 2020-09-22 PROCEDURE — 96374 THER/PROPH/DIAG INJ IV PUSH: CPT

## 2020-09-22 PROCEDURE — 74011250636 HC RX REV CODE- 250/636: Performed by: NURSE PRACTITIONER

## 2020-09-22 PROCEDURE — 85025 COMPLETE CBC W/AUTO DIFF WBC: CPT

## 2020-09-22 PROCEDURE — 83880 ASSAY OF NATRIURETIC PEPTIDE: CPT

## 2020-09-22 RX ORDER — FUROSEMIDE 10 MG/ML
40 INJECTION INTRAMUSCULAR; INTRAVENOUS
Status: COMPLETED | OUTPATIENT
Start: 2020-09-22 | End: 2020-09-22

## 2020-09-22 RX ORDER — DIAZEPAM 2 MG/1
2 TABLET ORAL
Status: COMPLETED | OUTPATIENT
Start: 2020-09-22 | End: 2020-09-22

## 2020-09-22 RX ORDER — HYDRALAZINE HYDROCHLORIDE 20 MG/ML
10 INJECTION INTRAMUSCULAR; INTRAVENOUS ONCE
Status: COMPLETED | OUTPATIENT
Start: 2020-09-22 | End: 2020-09-22

## 2020-09-22 RX ORDER — ACETAMINOPHEN 325 MG/1
650 TABLET ORAL
Status: COMPLETED | OUTPATIENT
Start: 2020-09-22 | End: 2020-09-22

## 2020-09-22 RX ADMIN — FUROSEMIDE 40 MG: 10 INJECTION, SOLUTION INTRAMUSCULAR; INTRAVENOUS at 22:39

## 2020-09-22 RX ADMIN — HYDRALAZINE HYDROCHLORIDE 10 MG: 20 INJECTION INTRAMUSCULAR; INTRAVENOUS at 22:39

## 2020-09-22 RX ADMIN — DIAZEPAM 2 MG: 2 TABLET ORAL at 22:39

## 2020-09-22 RX ADMIN — ACETAMINOPHEN 650 MG: 325 TABLET ORAL at 21:16

## 2020-09-22 NOTE — ED PROVIDER NOTES
78 y/o female with extensive cardiac history presents to [facility] with c/o bilateral leg swelling arrives via EMS from Home. Patient states that she lives alone at the [facility]. Endorses numbness to the bottom of the feet. Patient reports that she has starting drinking more Coca Cola in the last 2 weeks and also fell 2 weeks ago, states that she did not have her walker and she went to reach for it she began falling forward onto her knees. Denies hitting head or LOC, states that someone helped her up. Denies fever, cough, chills, CP, SOB, N/V/D, denies feeling dizzy or light headed. Patient states she voids frequency with mild pain, endorses being incontinent- not new. Ambulates with walker at home at all times.                 Past Medical History:   Diagnosis Date    Anxiety disorder     Atrial fibrillation (HCC)     CAD (coronary artery disease) 2007    stents, CABG x 3v    Carotid stenosis     Cervical stenosis of spinal canal 07/2019    Chronic kidney disease     Cough     CVA (cerebral vascular accident) ([facility] [address] 07/2019    left lacunar infarct at head of caudate    Depression     AND CHRONIC ANXIETY    Diabetes (HCC)     GERD (gastroesophageal reflux disease)     High cholesterol     History of peptic ulcer     Bleeding ulcer with increased NSAID use    Hypertension     Left carotid stenosis 07/2019    s/p left CEA with Dr. Moreno Moment, old 2007    PUD (peptic ulcer disease)     Stroke ([facility] [address]     Tremor     Valvular heart disease        Past Surgical History:   Procedure Laterality Date    CARDIAC SURG PROCEDURE UNLIST      CABG X3 VESSEL    HX CAROTID ENDARTERECTOMY  07/2019    HX CORONARY ARTERY BYPASS GRAFT      HX TONSILLECTOMY  1963    TOTAL KNEE ARTHROPLASTY Right 2015         Family History:   Problem Relation Age of Onset    Heart Attack Mother 72        Dec 81yo    Hypertension Mother     Other Father         Unknown    Parkinson's Disease Brother     Anesth Problems Neg Hx        Social History     Socioeconomic History    Marital status: SINGLE     Spouse name: Not on file    Number of children: Not on file    Years of education: Not on file    Highest education level: Not on file   Occupational History    Occupation: Retired realestate/teacher   Social Needs    Financial resource strain: Not on file    Food insecurity     Worry: Not on file     Inability: Not on file   [employer] needs     Medical: Not on file     Non-medical: Not on file   Tobacco Use    Smoking status: Former Smoker     Packs/day: 0.25     Years: 5.00     Pack years: 1.25     Types: Cigarettes     Last attempt to quit:      Years since quittin.7    Smokeless tobacco: Never Used   Substance and Sexual Activity    Alcohol use: Yes     Alcohol/week: 0.0 standard drinks     Comment: Rare    Drug use: No    Sexual activity: Not on file   Lifestyle    Physical activity     Days per week: Not on file     Minutes per session: Not on file    Stress: Not on file   Relationships    Social connections     Talks on phone: Not on file     Gets together: Not on file     Attends Buddhism service: Not on file     Active member of club or organization: Not on file     Attends meetings of clubs or organizations: Not on file     Relationship status: Not on file    Intimate partner violence     Fear of current or ex partner: Not on file     Emotionally abused: Not on file     Physically abused: Not on file     Forced sexual activity: Not on file   Other Topics Concern    Not on file   Social History Narrative    Lives in [facility]         ALLERGIES: Patient has no known allergies. Review of Systems   Constitutional: Negative for chills and fever. Respiratory: Negative for cough and shortness of breath. Cardiovascular: Positive for leg swelling. Negative for chest pain. Bilateral leg swelling.     Gastrointestinal: Negative for abdominal pain, constipation, diarrhea, nausea and vomiting. Genitourinary: Positive for dysuria and frequency. Negative for difficulty urinating and urgency. Musculoskeletal: Positive for arthralgias, gait problem and myalgias. Bilateral leg swelling and pain. Skin: Negative for wound. Neurological: Negative for dizziness, weakness, light-headedness and headaches. Psychiatric/Behavioral: Negative for confusion. Vitals:    20 2200 20 2215 20 2230 20 2245   BP: (!) 204/61 (!) 207/68 (!) 196/64 (!) 201/59   Pulse: (!) 49 (!) 51 (!) 52 (!) 55   Resp: 17 15 17 17   Temp:       SpO2: 100% 97% 96% 96%            Physical Exam  Vitals signs and nursing note reviewed. Constitutional:       General: She is not in acute distress. Appearance: Normal appearance. She is not ill-appearing. HENT:      Head: Normocephalic. Nose: Nose normal.   Neck:      Musculoskeletal: Normal range of motion. Cardiovascular:      Rate and Rhythm: Bradycardia present. Pulses:           Dorsalis pedis pulses are 1+ on the right side and 1+ on the left side. Pulmonary:      Effort: Pulmonary effort is normal. No respiratory distress. Breath sounds: No wheezing, rhonchi or rales. Abdominal:      General: There is no distension. Tenderness: There is no abdominal tenderness. There is no guarding or rebound. Musculoskeletal:      Right knee: She exhibits decreased range of motion and swelling. Left knee: She exhibits decreased range of motion and swelling. Right lower leg: 3+ Edema present. Left lower le+ Edema present. Skin:     General: Skin is dry. Neurological:      Mental Status: She is alert and oriented to person, place, and time. GCS: GCS eye subscore is 4. GCS verbal subscore is 5. GCS motor subscore is 6. Motor: Weakness present.       Gait: Gait abnormal.   Psychiatric:         Mood and Affect: Mood normal.         Speech: Speech normal. Behavior: Behavior normal. Behavior is cooperative. MDM  Number of Diagnoses or Management Options  Edema of both lower legs:   Unable to ambulate:      Amount and/or Complexity of Data Reviewed  Decide to obtain previous medical records or to obtain history from someone other than the patient: yes      VITAL SIGNS:  Patient Vitals for the past 4 hrs:   Pulse Resp BP SpO2   09/22/20 2245 (!) 55 17 (!) 201/59 96 %   09/22/20 2230 (!) 52 17 (!) 196/64 96 %   09/22/20 2215 (!) 51 15 (!) 207/68 97 %   09/22/20 2200 (!) 49 17 (!) 204/61 100 %   09/22/20 2149   (!) 223/51 100 %         LABS:  No results found for this or any previous visit (from the past 6 hour(s)). IMAGING:  XR CHEST PA LAT   Final Result   IMPRESSION: No acute findings. Medications During Visit:  Medications   acetaminophen (TYLENOL) tablet 650 mg (650 mg Oral Given 9/22/20 2116)   furosemide (LASIX) injection 40 mg (40 mg IntraVENous Given 9/22/20 2239)   diazePAM (VALIUM) tablet 2 mg (2 mg Oral Given 9/22/20 2239)   hydrALAZINE (APRESOLINE) 20 mg/mL injection 10 mg (10 mg IntraVENous Given 9/22/20 2239)         DECISION MAKING:  Salena Perez is a 79 y.o. female who comes in as above. Patient lives alone, presents with walker from home with complaints of increased swelling and pain to bilateral lower extremities over the last 2 weeks. Patient states that she fell onto her knees while trying to reach for her walker 2 weeks ago, and was not seen by an MD.  Patient denies recent long car trips, tobacco use. And discussed with patient for basic labs, urine, imaging. Patient hypertensive upon arrival, denies headache, vision change, feeling dizzy or lightheaded. Re-evaluation: Discussed imaging and lab results with patient, unremarkable for acute infection, dehydration or acute kidney failure, proBNP slightly elevated at 1,561.   Patient unable to ambulate with walker from home that she brought due to pain and discomfort from lower extremities. Chloe Bernal discussed with patient to give IV Lasix, consult PT OT, and consult cardiology. Patient remains hypertensive at this time, will give patient her home dose of Valium 2 mg to help with her nerves as per requested, and a one-time hydralazine dose IV. Patient remains asymptomatic for elevated blood pressure. Hospitalist contacted for admission. IMPRESSION:  1. Edema of both lower legs    2. Unable to ambulate        DISPOSITION:  Admitted      Current Discharge Medication List           Follow-up Information    None       Perfect Serve Consult for Admission  9:50 PM    ED Room Number: ER16/16  Patient Name and age:  Devon Meraz 79 y.o.  female  Working Diagnosis:   1. Edema of both lower legs    2. Unable to ambulate        COVID-19 Suspicion:  no  Sepsis present:  no    Reassessment needed: no  Code Status:  Full Code  Readmission: no  Isolation Requirements:  no  Recommended Level of Care:  telemetry  Department:[facility] - 21   Other:  Patient presents with BLE edema, 4+ pitting edema, increased over the last 2 weeks. Unable to ambulate due to pain and swelling. Lives alone. Admit for IV lasix, probable Cardiology consult, no active CP/SOB complaints. PT/OT consult. Procedures    Discussed patient care with Sherif Meier DO. Attending saw and evaluated the patient. Agrees with care plan as discussed.   Shadia Steward NP  9:50 PM

## 2020-09-22 NOTE — ED TRIAGE NOTES
Pt arrives via EMS from home c/o bilateral lower leg edema with pain. Denies SOB, CP. Pt reports urinary frequency with dysuria.

## 2020-09-23 LAB
ATRIAL RATE: 54 BPM
ATRIAL RATE: 58 BPM
CALCULATED P AXIS, ECG09: 17 DEGREES
CALCULATED P AXIS, ECG09: 31 DEGREES
CALCULATED R AXIS, ECG10: -48 DEGREES
CALCULATED R AXIS, ECG10: -53 DEGREES
CALCULATED T AXIS, ECG11: 126 DEGREES
CALCULATED T AXIS, ECG11: 91 DEGREES
CHOLEST SERPL-MCNC: 148 MG/DL
DIAGNOSIS, 93000: NORMAL
DIAGNOSIS, 93000: NORMAL
HDLC SERPL-MCNC: 52 MG/DL
HDLC SERPL: 2.8 {RATIO} (ref 0–5)
LDLC SERPL CALC-MCNC: 83.8 MG/DL (ref 0–100)
LIPID PROFILE,FLP: NORMAL
P-R INTERVAL, ECG05: 166 MS
P-R INTERVAL, ECG05: 172 MS
Q-T INTERVAL, ECG07: 446 MS
Q-T INTERVAL, ECG07: 474 MS
QRS DURATION, ECG06: 100 MS
QRS DURATION, ECG06: 110 MS
QTC CALCULATION (BEZET), ECG08: 437 MS
QTC CALCULATION (BEZET), ECG08: 449 MS
T4 FREE SERPL-MCNC: 1.1 NG/DL (ref 0.8–1.5)
TRIGL SERPL-MCNC: 61 MG/DL (ref ?–150)
TSH SERPL DL<=0.05 MIU/L-ACNC: 3.22 UIU/ML (ref 0.36–3.74)
VENTRICULAR RATE, ECG03: 54 BPM
VENTRICULAR RATE, ECG03: 58 BPM
VLDLC SERPL CALC-MCNC: 12.2 MG/DL

## 2020-09-23 PROCEDURE — 97116 GAIT TRAINING THERAPY: CPT

## 2020-09-23 PROCEDURE — 74011250636 HC RX REV CODE- 250/636: Performed by: INTERNAL MEDICINE

## 2020-09-23 PROCEDURE — 74011250637 HC RX REV CODE- 250/637: Performed by: PHYSICIAN ASSISTANT

## 2020-09-23 PROCEDURE — 99218 HC RM OBSERVATION: CPT

## 2020-09-23 PROCEDURE — 93005 ELECTROCARDIOGRAM TRACING: CPT

## 2020-09-23 PROCEDURE — 99223 1ST HOSP IP/OBS HIGH 75: CPT | Performed by: INTERNAL MEDICINE

## 2020-09-23 PROCEDURE — 97535 SELF CARE MNGMENT TRAINING: CPT

## 2020-09-23 PROCEDURE — 97161 PT EVAL LOW COMPLEX 20 MIN: CPT

## 2020-09-23 PROCEDURE — 74011250636 HC RX REV CODE- 250/636: Performed by: NURSE PRACTITIONER

## 2020-09-23 PROCEDURE — 74011250637 HC RX REV CODE- 250/637: Performed by: HOSPITALIST

## 2020-09-23 PROCEDURE — 84439 ASSAY OF FREE THYROXINE: CPT

## 2020-09-23 PROCEDURE — 84443 ASSAY THYROID STIM HORMONE: CPT

## 2020-09-23 PROCEDURE — 96372 THER/PROPH/DIAG INJ SC/IM: CPT

## 2020-09-23 PROCEDURE — 74011000258 HC RX REV CODE- 258: Performed by: HOSPITALIST

## 2020-09-23 PROCEDURE — 96376 TX/PRO/DX INJ SAME DRUG ADON: CPT

## 2020-09-23 PROCEDURE — 74011250636 HC RX REV CODE- 250/636: Performed by: HOSPITALIST

## 2020-09-23 PROCEDURE — 36415 COLL VENOUS BLD VENIPUNCTURE: CPT

## 2020-09-23 PROCEDURE — 97165 OT EVAL LOW COMPLEX 30 MIN: CPT

## 2020-09-23 PROCEDURE — 80061 LIPID PANEL: CPT

## 2020-09-23 PROCEDURE — 96375 TX/PRO/DX INJ NEW DRUG ADDON: CPT

## 2020-09-23 RX ORDER — LISINOPRIL 5 MG/1
2.5 TABLET ORAL DAILY
Status: DISCONTINUED | OUTPATIENT
Start: 2020-09-23 | End: 2020-09-23

## 2020-09-23 RX ORDER — HYDRALAZINE HYDROCHLORIDE 20 MG/ML
20 INJECTION INTRAMUSCULAR; INTRAVENOUS ONCE
Status: COMPLETED | OUTPATIENT
Start: 2020-09-23 | End: 2020-09-23

## 2020-09-23 RX ORDER — DULOXETIN HYDROCHLORIDE 60 MG/1
120 CAPSULE, DELAYED RELEASE ORAL DAILY
Status: DISCONTINUED | OUTPATIENT
Start: 2020-09-23 | End: 2020-10-04 | Stop reason: HOSPADM

## 2020-09-23 RX ORDER — ASPIRIN 81 MG/1
81 TABLET ORAL DAILY
Status: DISCONTINUED | OUTPATIENT
Start: 2020-09-23 | End: 2020-10-04 | Stop reason: HOSPADM

## 2020-09-23 RX ORDER — PROMETHAZINE HYDROCHLORIDE 25 MG/1
12.5 TABLET ORAL
Status: DISCONTINUED | OUTPATIENT
Start: 2020-09-23 | End: 2020-10-04 | Stop reason: HOSPADM

## 2020-09-23 RX ORDER — FUROSEMIDE 10 MG/ML
40 INJECTION INTRAMUSCULAR; INTRAVENOUS 2 TIMES DAILY
Status: DISCONTINUED | OUTPATIENT
Start: 2020-09-23 | End: 2020-09-25

## 2020-09-23 RX ORDER — CARBIDOPA AND LEVODOPA 25; 100 MG/1; MG/1
2 TABLET ORAL 4 TIMES DAILY
Status: DISCONTINUED | OUTPATIENT
Start: 2020-09-23 | End: 2020-10-04 | Stop reason: HOSPADM

## 2020-09-23 RX ORDER — SODIUM CHLORIDE 0.9 % (FLUSH) 0.9 %
5-40 SYRINGE (ML) INJECTION EVERY 8 HOURS
Status: DISCONTINUED | OUTPATIENT
Start: 2020-09-23 | End: 2020-10-04 | Stop reason: HOSPADM

## 2020-09-23 RX ORDER — POLYETHYLENE GLYCOL 3350 17 G/17G
17 POWDER, FOR SOLUTION ORAL DAILY PRN
Status: DISCONTINUED | OUTPATIENT
Start: 2020-09-23 | End: 2020-10-04 | Stop reason: HOSPADM

## 2020-09-23 RX ORDER — AMLODIPINE BESYLATE 5 MG/1
5 TABLET ORAL DAILY
Status: DISCONTINUED | OUTPATIENT
Start: 2020-09-24 | End: 2020-10-04 | Stop reason: HOSPADM

## 2020-09-23 RX ORDER — SODIUM CHLORIDE 0.9 % (FLUSH) 0.9 %
5-40 SYRINGE (ML) INJECTION AS NEEDED
Status: DISCONTINUED | OUTPATIENT
Start: 2020-09-23 | End: 2020-10-04 | Stop reason: HOSPADM

## 2020-09-23 RX ORDER — CARVEDILOL 3.12 MG/1
3.12 TABLET ORAL 2 TIMES DAILY WITH MEALS
Status: DISCONTINUED | OUTPATIENT
Start: 2020-09-23 | End: 2020-10-04 | Stop reason: HOSPADM

## 2020-09-23 RX ORDER — ENOXAPARIN SODIUM 100 MG/ML
40 INJECTION SUBCUTANEOUS DAILY
Status: DISCONTINUED | OUTPATIENT
Start: 2020-09-23 | End: 2020-09-24

## 2020-09-23 RX ORDER — HYDRALAZINE HYDROCHLORIDE 20 MG/ML
10 INJECTION INTRAMUSCULAR; INTRAVENOUS
Status: DISCONTINUED | OUTPATIENT
Start: 2020-09-23 | End: 2020-10-04 | Stop reason: HOSPADM

## 2020-09-23 RX ORDER — ATORVASTATIN CALCIUM 40 MG/1
40 TABLET, FILM COATED ORAL
Status: DISCONTINUED | OUTPATIENT
Start: 2020-09-23 | End: 2020-10-04 | Stop reason: HOSPADM

## 2020-09-23 RX ORDER — ONDANSETRON 2 MG/ML
4 INJECTION INTRAMUSCULAR; INTRAVENOUS
Status: DISCONTINUED | OUTPATIENT
Start: 2020-09-23 | End: 2020-09-23 | Stop reason: SDUPTHER

## 2020-09-23 RX ORDER — ONDANSETRON 2 MG/ML
4 INJECTION INTRAMUSCULAR; INTRAVENOUS
Status: DISCONTINUED | OUTPATIENT
Start: 2020-09-23 | End: 2020-10-04 | Stop reason: HOSPADM

## 2020-09-23 RX ORDER — HYDRALAZINE HYDROCHLORIDE 25 MG/1
25 TABLET, FILM COATED ORAL 3 TIMES DAILY
Status: DISCONTINUED | OUTPATIENT
Start: 2020-09-23 | End: 2020-09-24

## 2020-09-23 RX ORDER — FAMOTIDINE 20 MG/1
20 TABLET, FILM COATED ORAL 2 TIMES DAILY
Status: DISCONTINUED | OUTPATIENT
Start: 2020-09-23 | End: 2020-09-23

## 2020-09-23 RX ORDER — ACETAMINOPHEN 650 MG/1
650 SUPPOSITORY RECTAL
Status: DISCONTINUED | OUTPATIENT
Start: 2020-09-23 | End: 2020-10-04 | Stop reason: HOSPADM

## 2020-09-23 RX ORDER — FAMOTIDINE 20 MG/1
20 TABLET, FILM COATED ORAL DAILY
Status: DISCONTINUED | OUTPATIENT
Start: 2020-09-24 | End: 2020-10-04 | Stop reason: HOSPADM

## 2020-09-23 RX ORDER — CLOPIDOGREL BISULFATE 75 MG/1
75 TABLET ORAL
Status: DISCONTINUED | OUTPATIENT
Start: 2020-09-23 | End: 2020-10-04 | Stop reason: HOSPADM

## 2020-09-23 RX ORDER — DOCUSATE SODIUM 100 MG/1
100 CAPSULE, LIQUID FILLED ORAL AS NEEDED
Status: DISCONTINUED | OUTPATIENT
Start: 2020-09-23 | End: 2020-10-04 | Stop reason: HOSPADM

## 2020-09-23 RX ORDER — ACETAMINOPHEN 325 MG/1
650 TABLET ORAL
Status: DISCONTINUED | OUTPATIENT
Start: 2020-09-23 | End: 2020-10-04 | Stop reason: HOSPADM

## 2020-09-23 RX ADMIN — FAMOTIDINE 20 MG: 20 TABLET ORAL at 09:07

## 2020-09-23 RX ADMIN — CARVEDILOL 3.12 MG: 3.12 TABLET, FILM COATED ORAL at 16:29

## 2020-09-23 RX ADMIN — DULOXETINE 120 MG: 60 CAPSULE, DELAYED RELEASE ORAL at 09:07

## 2020-09-23 RX ADMIN — ASPIRIN 81 MG: 81 TABLET, COATED ORAL at 09:07

## 2020-09-23 RX ADMIN — ATORVASTATIN CALCIUM 40 MG: 40 TABLET, FILM COATED ORAL at 04:03

## 2020-09-23 RX ADMIN — HYDRALAZINE HYDROCHLORIDE 20 MG: 20 INJECTION INTRAMUSCULAR; INTRAVENOUS at 04:33

## 2020-09-23 RX ADMIN — CARBIDOPA AND LEVODOPA 2 TABLET: 25; 100 TABLET ORAL at 09:07

## 2020-09-23 RX ADMIN — CARBIDOPA AND LEVODOPA 2 TABLET: 25; 100 TABLET ORAL at 14:50

## 2020-09-23 RX ADMIN — CARVEDILOL 3.12 MG: 3.12 TABLET, FILM COATED ORAL at 09:07

## 2020-09-23 RX ADMIN — CEFAZOLIN SODIUM 1 G: 1 INJECTION, POWDER, FOR SOLUTION INTRAMUSCULAR; INTRAVENOUS at 16:32

## 2020-09-23 RX ADMIN — CARBIDOPA AND LEVODOPA 2 TABLET: 25; 100 TABLET ORAL at 18:25

## 2020-09-23 RX ADMIN — Medication 10 ML: at 21:07

## 2020-09-23 RX ADMIN — HYDRALAZINE HYDROCHLORIDE 25 MG: 25 TABLET, FILM COATED ORAL at 21:06

## 2020-09-23 RX ADMIN — HYDRALAZINE HYDROCHLORIDE 25 MG: 25 TABLET, FILM COATED ORAL at 16:28

## 2020-09-23 RX ADMIN — CLOPIDOGREL BISULFATE 75 MG: 75 TABLET ORAL at 09:07

## 2020-09-23 RX ADMIN — CEFAZOLIN SODIUM 1 G: 1 INJECTION, POWDER, FOR SOLUTION INTRAMUSCULAR; INTRAVENOUS at 04:02

## 2020-09-23 RX ADMIN — ENOXAPARIN SODIUM 40 MG: 40 INJECTION SUBCUTANEOUS at 09:06

## 2020-09-23 RX ADMIN — CARBIDOPA AND LEVODOPA 2 TABLET: 25; 100 TABLET ORAL at 21:06

## 2020-09-23 RX ADMIN — Medication 10 ML: at 07:06

## 2020-09-23 RX ADMIN — Medication 10 ML: at 14:00

## 2020-09-23 RX ADMIN — FUROSEMIDE 40 MG: 10 INJECTION, SOLUTION INTRAMUSCULAR; INTRAVENOUS at 09:07

## 2020-09-23 RX ADMIN — FUROSEMIDE 40 MG: 10 INJECTION, SOLUTION INTRAMUSCULAR; INTRAVENOUS at 18:25

## 2020-09-23 NOTE — PROGRESS NOTES
0730 Bedside shift change report given to Mirna Fleischer, RN (oncoming nurse) by Franco Whiting RN (offgoing nurse). Report included the following information SBAR, Kardex, Intake/Output, MAR and Recent Results. 1500 TRANSFER - OUT REPORT:    Verbal report given to Rose Beckwith RN (name) on Figueroa Chairez  being transferred to  (unit) for routine progression of care       Report consisted of patients Situation, Background, Assessment and   Recommendations(SBAR). Information from the following report(s) SBAR, Kardex, Intake/Output, MAR and Recent Results was reviewed with the receiving nurse. Lines:   Peripheral IV 09/22/20 Left Antecubital (Active)   Site Assessment Clean, dry, & intact 09/23/20 0730   Phlebitis Assessment 0 09/23/20 0730   Infiltration Assessment 0 09/23/20 0730   Dressing Status Clean, dry, & intact 09/23/20 0730   Dressing Type Transparent;Tape 09/23/20 0730   Hub Color/Line Status Pink;Capped 09/23/20 0730   Action Taken Open ports on tubing capped 09/23/20 0730   Alcohol Cap Used Yes 09/23/20 0730        Opportunity for questions and clarification was provided.       Patient transported with:   Posterous

## 2020-09-23 NOTE — PROGRESS NOTES
Hospitalist Progress Note  Belinda Tavarez MD  Answering service: [phone] from in house phone        Date of Service:  2020  NAME:  Ju Curiel  :  1940  MRN:  [id number]      Admission Summary:   As per initial admission summary  Elida Hylton is a 79 y.o.  female who has a history of hypertension, CVA, carotid stenosis presents with lower extremity swelling and generalized weakness. She also reports that she was unable to walk due to swelling in the legs. She reports this was insidious in onset and gradually progressing. She denies any falls. She reports that she lives alone at home and is unable to take care of regular activities of daily living. She presents to the emergency room and noted to have swelling in the lower extremities and referred for admission. Currently patient denies chest pain shortness breath nausea vomiting or diarrhea.       We were asked to admit for work up and evaluation of the above problems        Interval history / Subjective:     Patient seen for Follow up of edema    Patient seen and examined by the bedside, Labs, images and notes reviewed  Patient is comfortable, denies any chest pain, SOB, abdominal pain, fevers, chills. No N/V/D  Discussed with nursing staff, no acute issues overnight, orders reviewed. On IV diuretics. Cardiology following  Will consult PT/OT     Assessment & Plan:     Lower extremity edema likely secondary to acute on chronic diastolic heart failure exacerbation  Duplex lower extremities negative for DVT. Continue with IV diuresis.     Cardiology following      Macular rash lower extremities  Possible cellulitis of lower extremities  Order Ancef  Duplex negative for DVT     Hypertension accelerated  Ordered Coreg     Generalized weakness  With underlying history of Parkinson's  PT OT evaluation  Prior work-up on the last admission negative for CVA.     Parkinson disease  Continue Sinemet     History of coronary disease  Continue with aspirin Plavix     CKD stage II   monitor and avoid nephrotoxins     Chronic A. Fib  Currently normal sinus rhythm. Not on anticoagulation per determination on previous admissions possibly secondary to risk of falls     History of chronic carotid artery stenosis  Continue with Plavix     Hyperlipidemia  Continue statin     Anemia of chronic disease  Monitor hemoglobin    Code status: Full code   DVT prophylaxis: enoxaprin    Care Plan discussed with: Patient/Family  Disposition: TBD     Hospital Problems  Date Reviewed: 5/1/2020          Codes Class Noted POA    CHF (congestive heart failure) ([facility] [address] ICD-10-CM: I50.9  ICD-9-CM: 428.0  9/22/2020 Unknown                Review of Systems:   A comprehensive review of systems was negative except for that written in the HPI. Vital Signs:    Last 24hrs VS reviewed since prior progress note. Most recent are:  Visit Vitals  BP (!) 132/52 (BP 1 Location: Left arm, BP Patient Position: Sitting)   Pulse 61   Temp 98.8 °F (37.1 °C)   Resp 18   Ht 5' 5\" (1.651 m)   Wt 68.3 kg (150 lb 8 oz)   SpO2 97%   BMI 25.04 kg/m²         Intake/Output Summary (Last 24 hours) at 9/23/2020 1609  Last data filed at 9/23/2020 0730  Gross per 24 hour   Intake 420 ml   Output    Net 420 ml        Physical Examination:             Constitutional:  No acute distress, cooperative, pleasant    ENT:  Oral mucous moist, oropharynx benign. Neck supple,    Resp:  CTA bilaterally. No wheezing/rhonchi/rales. No accessory muscle use   CV:  Regular rhythm, normal rate, no murmurs, gallops, rubs    GI:  Soft, non distended, non tender. normoactive bowel sounds, no hepatosplenomegaly     Musculoskeletal:  positive edema, warm, 2+ pulses throughout,     Neurologic:  Moves all extremities.   AAOx3, CN II-XII reviewed            Data Review:    Review and/or order of clinical lab test  Review and/or order of tests in the radiology section of CPT  Review and/or order of tests in the medicine section of CPT      Labs:     Recent Labs     09/22/20 1757   WBC 6.1   HGB 10.7*   HCT 33.7*        Recent Labs     09/22/20 1757      K 3.9      CO2 25   BUN 21*   CREA 1.43*   GLU 97   CA 10.0     Recent Labs     09/22/20 1757   ALT 10*   *   TBILI 0.5   TP 7.2   ALB 3.7   GLOB 3.5     No results for input(s): INR, PTP, APTT, INREXT in the last 72 hours. No results for input(s): FE, TIBC, PSAT, FERR in the last 72 hours. No results found for: FOL, RBCF   No results for input(s): PH, PCO2, PO2 in the last 72 hours.   Recent Labs     09/22/20 1757   TROIQ <0.05     Lab Results   Component Value Date/Time    Cholesterol, total 148 09/23/2020 04:27 AM    HDL Cholesterol 52 09/23/2020 04:27 AM    LDL, calculated 83.8 09/23/2020 04:27 AM    Triglyceride 61 09/23/2020 04:27 AM    CHOL/HDL Ratio 2.8 09/23/2020 04:27 AM     Lab Results   Component Value Date/Time    Glucose (POC) 118 (H) 05/11/2020 11:35 AM    Glucose (POC) 139 (H) 07/26/2019 04:34 PM    Glucose (POC) 126 (H) 07/26/2019 11:01 AM    Glucose (POC) 94 07/12/2019 05:40 PM    Glucose (POC) 135 (H) 02/17/2015 06:46 AM     Lab Results   Component Value Date/Time    Color YELLOW/STRAW 09/22/2020 06:12 PM    Appearance CLEAR 09/22/2020 06:12 PM    Specific gravity 1.009 09/22/2020 06:12 PM    pH (UA) 7.0 09/22/2020 06:12 PM    Protein 100 (A) 09/22/2020 06:12 PM    Glucose Negative 09/22/2020 06:12 PM    Ketone Negative 09/22/2020 06:12 PM    Bilirubin Negative 09/22/2020 06:12 PM    Urobilinogen 0.2 09/22/2020 06:12 PM    Nitrites Negative 09/22/2020 06:12 PM    Leukocyte Esterase TRACE (A) 09/22/2020 06:12 PM    Epithelial cells FEW 09/22/2020 06:12 PM    Bacteria Negative 09/22/2020 06:12 PM    WBC 0-4 09/22/2020 06:12 PM    RBC 0-5 09/22/2020 06:12 PM         Medications Reviewed:     Current Facility-Administered Medications   Medication Dose Route Frequency    carvediloL (COREG) tablet 3.125 mg  3.125 mg Oral BID WITH MEALS    docusate sodium (COLACE) capsule 100 mg  100 mg Oral PRN    aspirin delayed-release tablet 81 mg  81 mg Oral DAILY    sodium chloride (NS) flush 5-40 mL  5-40 mL IntraVENous Q8H    sodium chloride (NS) flush 5-40 mL  5-40 mL IntraVENous PRN    acetaminophen (TYLENOL) tablet 650 mg  650 mg Oral Q6H PRN    Or    acetaminophen (TYLENOL) suppository 650 mg  650 mg Rectal Q6H PRN    polyethylene glycol (MIRALAX) packet 17 g  17 g Oral DAILY PRN    promethazine (PHENERGAN) tablet 12.5 mg  12.5 mg Oral Q6H PRN    Or    ondansetron (ZOFRAN) injection 4 mg  4 mg IntraVENous Q6H PRN    enoxaparin (LOVENOX) injection 40 mg  40 mg SubCUTAneous DAILY    carbidopa-levodopa (SINEMET)  mg per tablet 2 Tab  2 Tab Oral QID    atorvastatin (LIPITOR) tablet 40 mg  40 mg Oral QHS    clopidogreL (PLAVIX) tablet 75 mg  75 mg Oral DAILY AFTER BREAKFAST    DULoxetine (CYMBALTA) capsule 120 mg  120 mg Oral DAILY    hydrALAZINE (APRESOLINE) 20 mg/mL injection 10 mg  10 mg IntraVENous Q6H PRN    furosemide (LASIX) injection 40 mg  40 mg IntraVENous BID    ceFAZolin (ANCEF) 1 g in 0.9% sodium chloride (MBP/ADV) 50 mL  1 g IntraVENous Q12H    [START ON 9/24/2020] famotidine (PEPCID) tablet 20 mg  20 mg Oral DAILY    hydrALAZINE (APRESOLINE) tablet 25 mg  25 mg Oral TID    [START ON 9/24/2020] amLODIPine (NORVASC) tablet 5 mg  5 mg Oral DAILY     ______________________________________________________________________  EXPECTED LENGTH OF STAY: - - -  ACTUAL LENGTH OF STAY:          0                 Dwain Willett MD

## 2020-09-23 NOTE — CARDIO/PULMONARY
Cardiac Rehab: Consult received and chart reviewed. Patient is not a candidate for cardiac rehab, at this time. Nuclear Stress Test on 5/5/2020 shows EF 53% and no new echo ordered. Additionally, patient has Parkinson's, ambulates with assistive devices, and has had recent falls. Patient does not meet the following criteria for enrollment in cardiac rehab. 
? EF ? 35% at time of enrollment ? Stable class 2-4 NYHA heart failure ? Optimal medical therapy for > 6 weeks ? No major hospitalizations within the last 6 weeks ? No major hospitalizations within the next 6 weeks Evelyn Nicole RN

## 2020-09-23 NOTE — PROGRESS NOTES
Problem: Mobility Impaired (Adult and Pediatric)  Goal: *Acute Goals and Plan of Care (Insert Text)  Description: FUNCTIONAL STATUS PRIOR TO ADMISSION: Patient was modified independent using a rolling walker for functional mobility. HOME SUPPORT PRIOR TO ADMISSION: The patient lived alone with caregiver assistance 4 hours x1 day/ wk to provide assistance for shopping, household tasks. Physical Therapy Goals  Initiated 9/23/2020  1. Patient will move from supine to sit and sit to supine , scoot up and down, and roll side to side in bed with modified independence within 7 day(s). 2.  Patient will transfer from bed to chair and chair to bed with modified independence using the least restrictive device within 7 day(s). 3.  Patient will perform sit to stand with modified independence within 7 day(s). 4.  Patient will ambulate with modified independence for 100 feet with the least restrictive device within 7 day(s). Outcome: Not Met     PHYSICAL THERAPY EVALUATION  Patient: Sarah Plata (50 y.o. female)  Date: 9/23/2020  Primary Diagnosis: CHF (congestive heart failure) ([facility] [I50.9]        Precautions:  Fall    ASSESSMENT  Based on the objective data described below, the patient presents with pain (bilateral feet), generalized weakness, impaired balance, anxious (re:her pain), decreased activity tolerance, and tremors limiting her functional mobility. At baseline, she is independent with the use of her RW and lives alone. Currently patient required min assist for transfers and to walk 10 ft with the rolling walker. Much encouragement was required for her to participate in gait training secondary to her anxiety and fear of worsening pain. Pain increases with weight bearing, improves when resting in bed. Patient is functioning below her baseline. Currently recommending inpatient rehab at discharge however patient became upset and tearful with this recommendation and refusing rehab. Hopefully, once her pain is managed, she will progress to modified independence to allow her to return home with HHPT. Current Level of Function Impacting Discharge (mobility/balance): Min assist    Functional Outcome Measure: The patient scored 6/28 on the Tinetti outcome measure which is indicative of high fall risk. Other factors to consider for discharge: Lives alone; functioning below her baseline mobility, pain     Patient will benefit from skilled therapy intervention to address the above noted impairments. PLAN :  Recommendations and Planned Interventions: bed mobility training, transfer training, gait training, therapeutic exercises, patient and family training/education, and therapeutic activities      Frequency/Duration: Patient will be followed by physical therapy:  5 times a week to address goals. Recommendation for discharge: (in order for the patient to meet his/her long term goals)  To be determined: Inpatient rehab vs HHPT pending progress and patient agreement    This discharge recommendation:  A follow-up discussion with the attending provider and/or case management is planned    IF patient discharges home will need the following DME: none - owns RW         SUBJECTIVE:   Patient stated I can't walk. The pain.     OBJECTIVE DATA SUMMARY:   HISTORY:    Past Medical History:   Diagnosis Date    Anxiety disorder     Atrial fibrillation (HCC)     CAD (coronary artery disease) 2007    stents, CABG x 3v    Carotid stenosis     Cervical stenosis of spinal canal 07/2019    Chronic kidney disease     Cough     CVA (cerebral vascular accident) ([address] 07/2019    left lacunar infarct at head of caudate    Depression     AND CHRONIC ANXIETY    Diabetes ([facility]     GERD (gastroesophageal reflux disease)     High cholesterol     History of peptic ulcer     Bleeding ulcer with increased NSAID use    Hypertension     Left carotid stenosis 07/2019    s/p left CEA with Dr. Raul Lopez, old 2007    PUD (peptic ulcer disease)     Stroke ([facility]     Tremor     Valvular heart disease      Past Surgical History:   Procedure Laterality Date    CARDIAC SURG PROCEDURE UNLIST      CABG X3 VESSEL    HX CAROTID ENDARTERECTOMY  07/2019    HX CORONARY ARTERY BYPASS GRAFT      HX TONSILLECTOMY  1963    TOTAL KNEE ARTHROPLASTY Right 2015       Personal factors and/or comorbidities impacting plan of care: PMH,     Home Situation  Home Environment: Apartment  # Steps to Enter: 0  One/Two Story Residence: One story  Living Alone: Yes  Support Systems: Family member(s)  Patient Expects to be Discharged to[de-identified] Apartment  Current DME Used/Available at Home: Christopher Daniels, dillan    EXAMINATION/PRESENTATION/DECISION MAKING:   Critical Behavior:  Neurologic State: Alert  Orientation Level: Oriented X4  Cognition: Follows commands, Decreased attention/concentration, Appropriate safety awareness  Safety/Judgement: Awareness of environment  Hearing: Auditory  Auditory Impairment: None  Skin:  No impairment observed  Edema: Min victor manuel legs, feet (lt>rt; leg>feet)  Range Of Motion:  AROM: Generally decreased, functional                       Strength:    Strength: Generally decreased, functional                    Tone & Sensation:   Tone: Normal              Sensation: Impaired(tingling victor manuel feet; intact to light touch)               Coordination:  Coordination: Within functional limits(tremors noted from Parkinsons)  Vision:   Reading glasses  Functional Mobility:  Bed Mobility:  N/t* received sitting on bsc w/ RN present. Remained sitting up in a chair. Transfers:  Sit to Stand: Minimum assistance;Assist x1;Adaptive equipment; Additional time  Stand to Sit: Minimum assistance;Assist x1;Adaptive equipment; Additional time  Stand Pivot Transfers: Minimum assistance;Assist x1;Adaptive equipment; Additional time     Bed to Chair: Minimum assistance;Assist x1;Additional time  Required cues to improve hand placement to push up from the bed and control descent. Leaves hands on the walker. Corrects with verbal and tactile cues. Balance:   Sitting: Intact; Without support  Standing: Impaired; Without support  Standing - Static: Constant support  Standing - Dynamic : Constant support  Ambulation/Gait Training:  Distance (ft): 10 Feet (ft)  Assistive Device: Gait belt;Walker, rolling  Ambulation - Level of Assistance: Minimal assistance; Additional time; Adaptive equipment;Assist x1(for walker management)     Gait Description (WDL): Exceptions to WDL  Gait Abnormalities: Decreased step clearance        Base of Support: Widened     Speed/Samira: Slow;Pace decreased (<100 feet/min); Shuffled  Step Length: Right shortened;Left shortened  Required cues for sequencing, walker management, hand placement on hand . Assist needed to advance the walker and with turns. Functional Measure:  Tinetti test:    Sitting Balance: 1  Arises: 0  Attempts to Rise: 0  Immediate Standing Balance: 0  Standing Balance: 1  Nudged: 0  Eyes Closed: 0  Turn 360 Degrees - Continuous/Discontinuous: 0  Turn 360 Degrees - Steady/Unsteady: 0  Sitting Down: 0  Balance Score: 2 Balance total score  Indication of Gait: 0  R Step Length/Height: 0  L Step Length/Height: 0  R Foot Clearance: 1  L Foot Clearance: 1  Step Symmetry: 1  Step Continuity: 0  Path: 1  Trunk: 0  Walking Time: 0  Gait Score: 4 Gait total score  Total Score: 6/28 Overall total score         Tinetti Tool Score Risk of Falls  <19 = High Fall Risk  19-24 = Moderate Fall Risk  25-28 = Low Fall Risk  Tinetti ME. Performance-Oriented Assessment of Mobility Problems in Elderly Patients. [address]; [id number].  (Scoring Description: PT Bulletin Feb. 10, 1993)    Older adults: Kathi Vance et al, 2009; n = 1000 [facility] elderly evaluated with ABC, KATJA, ADL, and IADL)  · Mean KATJA score for males aged 69-68 years = 26.21(3.40)  · Mean KATJA score for females age 69-68 years = 25.16(4.30)  · Mean KATJA score for males over [de-identified] years = 23.29(6.02)  · Mean KATJA score for females over 80 years = 17.20(8.32)            Physical Therapy Evaluation Charge Determination   History Examination Presentation Decision-Making   MEDIUM  Complexity : 1-2 comorbidities / personal factors will impact the outcome/ POC  LOW Complexity : 1-2 Standardized tests and measures addressing body structure, function, activity limitation and / or participation in recreation  LOW Complexity : Stable, uncomplicated  LOW Complexity : FOTO score of       Based on the above components, the patient evaluation is determined to be of the following complexity level: LOW     Pain Rating:  Patient voiced bilateral foot pain (plantar surface); worse with weight bearing; improves with non weight bearing. Patient did not quantify    Activity Tolerance:   Fair and limited by pain   Please refer to the flowsheet for vital signs taken during this treatment. After treatment patient left in no apparent distress:   Sitting in chair, Call bell within reach, and foot rests elevated    COMMUNICATION/EDUCATION:   The patients plan of care was discussed with: Occupational therapist and Registered nurse. Fall prevention education was provided and the patient/caregiver indicated understanding., Patient/family have participated as able in goal setting and plan of care. , and Patient/family agree to work toward stated goals and plan of care.     Thank you for this referral.  Loulou Vázquez, PT   Time Calculation: 27 mins

## 2020-09-23 NOTE — PROGRESS NOTES
Pharmacy Renal Dosing Protocol  1. Medication: cefazolin   Indication: SSTI  Current regimen:  1g IV every 8 hr x5days  Recent Labs     09/22/20  1757   CREA 1.43*   BUN 21*     Estimated CrCl:  28 ml/min  Plan: Change to 1g IV q12h x5 days for CrCl 11-34 ml/min    2.  Medication: famotidine   Current regimen:  20 mg every 12 hr  Recent Labs     09/22/20  1757   CREA 1.43*   BUN 21*     Estimated CrCl:  28 ml/min  Plan: Change to 20mg every day for CrCl < 50 ml/min

## 2020-09-23 NOTE — PROGRESS NOTES
0300  Bedside shift change report given to Sudhir Serrano (oncoming nurse) by [address] [place] [address] (offgoing nurse). Report included the following information SBAR, Kardex, Intake/Output, MAR, Accordion, Recent Results and Cardiac Rhythm NSR/SB.

## 2020-09-23 NOTE — H&P
Hospitalist Admission Note    NAME: Sarah Plata   :  1940   MRN:  [id number]     Date/Time:  2020 2:06 AM    Patient PCP: Jessica Douglas, DO  _____________________________________________________________________  Given the patient's current clinical presentation, I have a high level of concern for decompensation if discharged from the emergency department. Complex decision making was performed, which includes reviewing the patient's available past medical records, laboratory results, and x-ray films. My assessment of this patient's clinical condition and my plan of care is as follows. Assessment / Plan:  Lower extremity edema likely secondary to acute on chronic diastolic heart failure exacerbation  Duplex lower extremities negative for DVT. Continue with IV diuresis. Will request cardiology evaluation. Macular rash lower extremities  Possible cellulitis of lower extremities  Order Ancef  Duplex negative for DVT    Hypertension accelerated  Ordered Coreg    Generalized weakness  With underlying history of Parkinson's  PT OT evaluation  Prior work-up on the last admission negative for CVA. Parkinson disease  Continue Sinemet    History of coronary disease  Continue with aspirin Plavix    CKD stage II   monitor and avoid nephrotoxins    Chronic A. Fib  Currently normal sinus rhythm.   Not on anticoagulation per determination on previous admissions possibly secondary to risk of falls    History of chronic carotid artery stenosis  Continue with Plavix    Hyperlipidemia  Continue statin    Anemia of chronic disease  Monitor hemoglobin    Code Status: Full code  Surrogate Decision Maker: Patient was not able to a point decision maker    DVT Prophylaxis: Lovenox  GI Prophylaxis: not indicated    FUNCTIONAL STATUS PRIOR TO ADMISSION: Ambulatory with Use of Assistive Devices    Patient is from: Home          Subjective:   CHIEF COMPLAINT: Lower extremity swelling    HISTORY OF PRESENT ILLNESS:     Gilson Castorena is a 79 y.o.  female who has a history of hypertension, CVA, carotid stenosis presents with lower extremity swelling and generalized weakness. She also reports that she was unable to walk due to swelling in the legs. She reports this was insidious in onset and gradually progressing. She denies any falls. She reports that she lives alone at home and is unable to take care of regular activities of daily living. She presents to the emergency room and noted to have swelling in the lower extremities and referred for admission. Currently patient denies chest pain shortness breath nausea vomiting or diarrhea. We were asked to admit for work up and evaluation of the above problems.      In ER she received Lasix IV    Past Medical History:   Diagnosis Date    Anxiety disorder     Atrial fibrillation (HCC)     CAD (coronary artery disease) 2007    stents, CABG x 3v    Carotid stenosis     Cervical stenosis of spinal canal 07/2019    Chronic kidney disease     Cough     [facility] 07/2019    left lacunar infarct at head of caudate    Depression     AND CHRONIC ANXIETY    Diabetes ([facility])     GERD (gastroesophageal reflux disease)     High cholesterol     History of peptic ulcer     Bleeding ulcer with increased NSAID use    Hypertension     Left carotid stenosis 07/2019    s/p left CEA with Dr. Michela Mcelroy, old 2007    PUD (peptic ulcer disease)     Stroke ([facility])     Tremor     Valvular heart disease         Past Surgical History:   Procedure Laterality Date    CARDIAC SURG PROCEDURE UNLIST      CABG X3 VESSEL    HX CAROTID ENDARTERECTOMY  07/2019    HX CORONARY ARTERY BYPASS GRAFT      HX TONSILLECTOMY  1963    TOTAL KNEE ARTHROPLASTY Right 2015       Social History     Tobacco Use    Smoking status: Former Smoker     Packs/day: 0.25     Years: 5.00     Pack years: 1.25     Types: Cigarettes     Last attempt to quit: 1969     Years since quittin.7    Smokeless tobacco: Never Used   Substance Use Topics    Alcohol use: Yes     Alcohol/week: 0.0 standard drinks     Comment: Rare        Family History   Problem Relation Age of Onset    Heart Attack Mother 72        Dec 81yo    Hypertension Mother     Other Father         Unknown    Parkinson's Disease Brother     Anesth Problems Neg Hx      No Known Allergies     Prior to Admission medications    Medication Sig Start Date End Date Taking? Authorizing Provider   atorvastatin (LIPITOR) 40 mg tablet Take 40 mg by mouth nightly. Provider, Historical   nitroglycerin (Nitrostat) 0.4 mg SL tablet 0.4 mg by SubLINGual route every five (5) minutes as needed for Chest Pain. Up to 3 doses. Provider, Historical   aspirin delayed-release (Ecotrin Low Strength) 81 mg tablet Take 81 mg by mouth nightly. Provider, Historical   clopidogreL (Plavix) 75 mg tab Take 75 mg by mouth daily (after breakfast). Provider, Historical   carbidopa-levodopa (SINEMET)  mg per tablet Take 2 Tabs by mouth four (4) times daily. 20   Esthela Merchant MD   acetaminophen (TYLENOL) 325 mg tablet Take 650 mg by mouth every six (6) hours as needed for Pain or Fever. Provider, Historical   diazePAM (VALIUM) 2 mg tablet Take 2 mg by mouth three (3) times daily. Indications: CHRONIC PAIN    Provider, Historical   cyanocobalamin (VITAMIN B12) 100 mcg tablet Take 100 mcg by mouth daily. Provider, Historical   vit C,N-Sj-cylya-lutein-zeaxan (PRESERVISION AREDS-2) 311-838-84-1 mg-unit-mg-mg cap capsule Take 1 Cap by mouth two (2) times a day. Provider, Historical   senna-docusate (SENNA WITH DOCUSATE SODIUM) 8.6-50 mg per tablet Take 1 Tab by mouth nightly. Provider, Historical   Cholecalciferol, Vitamin D3, 1,000 unit cap Take 1,000 Units by mouth daily. Provider, Historical   pantoprazole (PROTONIX) 40 mg tablet Take 40 mg by mouth Daily (before breakfast).  Indications: h/o bleeding ulcer with nsaid    Provider, Historical   DULoxetine (CYMBALTA) 60 mg capsule Take 120 mg by mouth daily. Indications: Anxiousness associated with Depression    Provider, Historical   multivitamins-minerals-lutein (CENTRUM SILVER) tab tablet Take 1 Tab by mouth daily. Provider, Historical       REVIEW OF SYSTEMS:     I am not able to complete the review of systems because: The patient is intubated and sedated    The patient has altered mental status due to his acute medical problems    The patient has baseline aphasia from prior stroke(s)    The patient has baseline dementia and is not reliable historian    The patient is in acute medical distress and unable to provide information           Total of 12 systems reviewed as follows:    Review of Systems   Constitutional: Negative. HENT: Negative. Eyes: Negative. Respiratory: Negative. Cardiovascular: Positive for leg swelling. Gastrointestinal: Negative. Genitourinary: Negative. Musculoskeletal: Negative. Skin: Negative. Neurological: Negative. Endo/Heme/Allergies: Negative. Psychiatric/Behavioral: Negative. Objective:   VITALS:    Visit Vitals  BP (!) 201/59   Pulse (!) 55   Temp 98.8 °F (37.1 °C)   Resp 17   SpO2 96%       PHYSICAL EXAM:    General:    Alert, cooperative, no distress, appears stated age. HEENT: Atraumatic, anicteric sclerae, pink conjunctivae     No oral ulcers, mucosa moist, throat clear, dentition fair  Neck:  Supple, symmetrical,  thyroid: non tender  Lungs:   Clear to auscultation bilaterally. No Wheezing or Rhonchi. No rales. Chest wall:  No tenderness  No Accessory muscle use. Heart:   Regular  rhythm,  No  murmur   2+ pitting edema noted lower extremities. Cellulitic area noted diffusely. Abdomen:   Soft, non-tender. Not distended. Bowel sounds normal  Extremities: No cyanosis.   No clubbing,      Skin turgor normal, Capillary refill normal, Radial dial pulse 2+  Skin:     Not pale.  Not Jaundiced  No rashes   Psych:  Good insight. Not depressed. Not anxious or agitated. Neurologic: EOMs intact. No facial asymmetry. No aphasia or slurred speech. Symmetrical strength, Sensation grossly intact. Alert and oriented X 4.     _______________________________________________________________________  Care Plan discussed with:    Comments   Patient X    Family      RN     Care Manager                    Consultant:      _______________________________________________________________________  Expected  Disposition:   Home with Family X   HH/PT/OT/RN    SNF/LTC    MELVINA    ________________________________________________________________________  TOTAL TIME: 62   Minutes    Critical Care Provided     Minutes non procedure based      Comments    X Reviewed previous records   >50% of visit spent in counseling and coordination of care X Discussion with patient and/or family and questions answered       Given the patient's current clinical presentation, I have a high level of concern for decompensation if discharged from the ED. Complex decision making was performed which includes reviewing the patient's available past medical records, laboratory results, and Xray films. I have also directly communicated my plan and discussed this case with the involved ED physician. Procedures: see electronic medical records for all procedures/Xrays and details which were not copied into this note but were reviewed prior to creation of Plan.     LAB DATA REVIEWED:    Recent Results (from the past 24 hour(s))   CBC WITH AUTOMATED DIFF    Collection Time: 09/22/20  5:57 PM   Result Value Ref Range    WBC 6.1 3.6 - 11.0 K/uL    RBC 3.65 (L) 3.80 - 5.20 M/uL    HGB 10.7 (L) 11.5 - 16.0 g/dL    HCT 33.7 (L) 35.0 - 47.0 %    MCV 92.3 80.0 - 99.0 FL    MCH 29.3 26.0 - 34.0 PG    MCHC 31.8 30.0 - 36.5 g/dL    RDW 13.6 11.5 - 14.5 %    PLATELET 403 705 - 179 K/uL    MPV 8.7 (L) 8.9 - 12.9 FL    NRBC 0.0 0  WBC ABSOLUTE NRBC 0.00 0.00 - 0.01 K/uL    NEUTROPHILS 67 32 - 75 %    LYMPHOCYTES 18 12 - 49 %    MONOCYTES 10 5 - 13 %    EOSINOPHILS 4 0 - 7 %    BASOPHILS 1 0 - 1 %    IMMATURE GRANULOCYTES 0 0.0 - 0.5 %    ABS. NEUTROPHILS 4.1 1.8 - 8.0 K/UL    ABS. LYMPHOCYTES 1.1 0.8 - 3.5 K/UL    ABS. MONOCYTES 0.6 0.0 - 1.0 K/UL    ABS. EOSINOPHILS 0.2 0.0 - 0.4 K/UL    ABS. BASOPHILS 0.0 0.0 - 0.1 K/UL    ABS. IMM. GRANS. 0.0 0.00 - 0.04 K/UL    DF AUTOMATED     METABOLIC PANEL, COMPREHENSIVE    Collection Time: 09/22/20  5:57 PM   Result Value Ref Range    Sodium 138 136 - 145 mmol/L    Potassium 3.9 3.5 - 5.1 mmol/L    Chloride 107 97 - 108 mmol/L    CO2 25 21 - 32 mmol/L    Anion gap 6 5 - 15 mmol/L    Glucose 97 65 - 100 mg/dL    BUN 21 (H) 6 - 20 MG/DL    Creatinine 1.43 (H) 0.55 - 1.02 MG/DL    BUN/Creatinine ratio 15 12 - 20      GFR est AA 43 (L) >60 ml/min/1.73m2    GFR est non-AA 35 (L) >60 ml/min/1.73m2    Calcium 10.0 8.5 - 10.1 MG/DL    Bilirubin, total 0.5 0.2 - 1.0 MG/DL    ALT (SGPT) 10 (L) 12 - 78 U/L    AST (SGOT) 19 15 - 37 U/L    Alk. phosphatase 143 (H) 45 - 117 U/L    Protein, total 7.2 6.4 - 8.2 g/dL    Albumin 3.7 3.5 - 5.0 g/dL    Globulin 3.5 2.0 - 4.0 g/dL    A-G Ratio 1.1 1.1 - 2.2     TROPONIN I    Collection Time: 09/22/20  5:57 PM   Result Value Ref Range    Troponin-I, Qt. <0.05 <0.05 ng/mL   SAMPLES BEING HELD    Collection Time: 09/22/20  5:57 PM   Result Value Ref Range    SAMPLES BEING HELD 1RED,1BLUE     COMMENT        Add-on orders for these samples will be processed based on acceptable specimen integrity and analyte stability, which may vary by analyte.    NT-PRO BNP    Collection Time: 09/22/20  5:57 PM   Result Value Ref Range    NT pro-BNP 1,561 (H) <450 PG/ML   URINALYSIS W/ REFLEX CULTURE    Collection Time: 09/22/20  6:12 PM    Specimen: Urine   Result Value Ref Range    Color YELLOW/STRAW      Appearance CLEAR CLEAR      Specific gravity 1.009 1.003 - 1.030      pH (UA) 7.0 5.0 - 8.0      Protein 100 (A) NEG mg/dL    Glucose Negative NEG mg/dL    Ketone Negative NEG mg/dL    Bilirubin Negative NEG      Blood Negative NEG      Urobilinogen 0.2 0.2 - 1.0 EU/dL    Nitrites Negative NEG      Leukocyte Esterase TRACE (A) NEG      UA:UC IF INDICATED CULTURE NOT INDICATED BY UA RESULT CNI      WBC 0-4 0 - 4 /hpf    RBC 0-5 0 - 5 /hpf    Epithelial cells FEW FEW /lpf    Bacteria Negative NEG /hpf    Hyaline cast 0-2 0 - 5 /lpf       Xr Chest Pa Lat    Result Date: 9/22/2020  IMPRESSION: No acute findings. Patient was explained about the risk associated with hospitalization including (and not a complete) risk of falls,fractures,blood clots,Bleeding from medications (including anticoagulants used for DVT ppx), Sepsis,allergic reactions,infections,radiation risk from the imaging studies performed,transfusions of blood products,Renal failure  and also risk of death. Patient also understands and agrees to the treatment plan including medications and also understand the risk with radiation while undergoing imaging studies. The patient  And  family  (after permission given by the patient) understands the need to be hospitalized and follow medical orders, agree with the admission plan. Thank You Dr Devin Galvan DO for taking care of your patient. Please call if you have any questions. Please note that this dictation was completed with [employer], the computer voice recognition software. Quite often unanticipated grammatical, syntax, homophones, and other interpretive errors are inadvertently transcribed by the computer software. Please disregard these errors. Please excuse any errors that have escaped final proofreading.   Thank you.        ____________________________________________________________________  Garold Spurling, MD

## 2020-09-23 NOTE — PROGRESS NOTES
Observation notice provided in writing to patient and/or caregiver as well as verbal explanation of the policy. Patients who are in outpatient status also receive the Observation notice. Patient was tired during initial encounter - CM will perform initial assessment as appropriate this afternoon.     Costa Granados, MPH

## 2020-09-23 NOTE — CONSULTS
Cardiology Consult/Progress Note           9/22/2020  6:56 PM   CHF (congestive heart failure) ([facility] [I50.9]     HPI:   Merna Nicolas is a 79 y.o. female admitted for CHF (congestive heart failure) ([facility] [I50.9]. She presented to the ER with complaints of bilateral lower extremity swelling. She notes that it is been slow in onset occurring over the last week or 2. She denies any chest pain or shortness of breath during this time. She admitted to the ER physician that she has been drinking more Coca-Cola in the last 2 weeks and also had a fall within the last 2 weeks. She says it is difficult to walk with her swollen legs; she also states that her feet and legs hurt as well. Past medical history for coronary artery disease with bypass, left carotid endarterectomy, hypertension, dyslipidemia, and chronic kidney disease.       Investigation:  Telemetry: normal sinus rhythm  ECG:   EKG Results     Procedure 720 Value Units Date/Time    EKG, 12 LEAD, INITIAL [[id number]] Collected:  09/23/20 0443    Order Status:  Completed Updated:  09/23/20 0735     Ventricular Rate 54 BPM      Atrial Rate 54 BPM      P-R Interval 172 ms      QRS Duration 110 ms      Q-T Interval 474 ms      QTC Calculation (Bezet) 449 ms      Calculated P Axis 31 degrees      Calculated R Axis -53 degrees      Calculated T Axis 126 degrees      Diagnosis --     Sinus bradycardia  Left anterior fascicular block  Left ventricular hypertrophy with repolarization abnormality  When compared with ECG of 22-SEP-2020 17:45,  No significant change was found  Confirmed by Acosta Fuller M.D., Lisa Ramos ([id number]) on 9/23/2020 7:35:21 AM      EKG 12 LEAD INITIAL [[id number]] Collected:  09/22/20 1745    Order Status:  Completed Updated:  09/23/20 0647     Ventricular Rate 58 BPM      Atrial Rate 58 BPM      P-R Interval 166 ms      QRS Duration 100 ms      Q-T Interval 446 ms      QTC Calculation (Bezet) 437 ms Calculated P Axis 17 degrees      Calculated R Axis -48 degrees      Calculated T Axis 91 degrees      Diagnosis --     Sinus bradycardia  Left anterior fascicular block  ST & T wave abnormality, consider lateral ischemia  When compared with ECG of 01-MAY-2020 04:42,  Sinus rhythm has replaced Atrial fibrillation  Vent. rate has decreased BY  50 BPM  Incomplete left bundle branch block is no longer present  Confirmed by Dipak Toth M.D., Gigivalentina Jaylin ([id number]) on 9/23/2020 6:47:18 AM      EKG 12 LEAD INITIAL [[id number]]     Order Status:  Sent           Echocardiogram:   04/30/20   ECHO ADULT COMPLETE 05/04/2020 5/4/2020    Narrative · Image quality for this study was technically difficult. · Normal cavity size and systolic function (ejection fraction normal). Upper normal wall thickness. Estimated left ventricular ejection fraction   is 55 - 60%. No regional wall motion abnormality noted. Mild (grade 1)   left ventricular diastolic dysfunction. · Moderately dilated left atrium. · Mildly reduced systolic function. · Mildly dilated right atrium. · Mild aortic valve regurgitation is present. · Mitral valve non-specific thickening. Moderate mitral annular   calcification. Moderate mitral valve regurgitation is present. · Mild tricuspid valve regurgitation is present. · Moderate pulmonary hypertension. Pulmonary arterial systolic pressure is   50 mmHg. Signed by: Torsten Carolina MD          Assessment and Plan     1. B/L LE edema   - 2/2 HTN urgency -    - Resume BP meds, IV Lasix  2. HTN urgency   -  max   - Coreg 3.125 mg BID - HR mid 50s   - Start Norvasc 5 mg; may consider increasing to 10 mg based on BP response   - May need consideration for ACEi/ARB despite CKD. May have in progression of CKD unless there is >25% reduction in GFR following ACEi initiation   - Start Hydralazine 25 mg TID - Prior home med  3. PHTN    - PAS on echo 50 mmHg   - Moderate  4.  MR   - Moderate on echo   - moderate LA dilation on echo  5. CKD   - Baseline 1.2 - 1.4   - Stage III - follow labs with diuresis  6. CAD   - s/p CABG x 3   - Coreg, Lipitor, Plavix, ASA  04/30/20   NUCLEAR CARDIAC STRESS TEST 05/05/2020, 05/05/2020 5/5/2020    Narrative · Baseline ECG: Normal sinus rhythm, non-specific ST-T wave abnormalities. · NM: No ischemia. Possible small distal anterior infarct. LVEF 53%. Rest and Lexiscan myocardial perfusion SPECT imaging is performed with IV   injections of 10.6 and 32.9 mCi technetium 99m Sestamibi respectively. SPECT images displayed in three planes show no ischemic reversibility. There is a small focus of distal anterior wall fixed thinning, possible   infarct. Gated SPECT images reviewed in 3 planes show unremarkable LV systolic wall   thickening. There is no segmental wall motion abnormality of the left   ventricular myocardium. The calculated LV ejection fraction is 53%. Pulmonary uptake and left ventricular cavity size appear normal.        Impression : No ischemia. Possible small distal anterior infarct. LVEF 53%. Signed by: Drew Lopez MD; Zulay Neville MD   7. Carotid dz   - s/p CEA on left   - Plavix, Lipitor  8. HLD  Cholesterol, total 148 09/23/2020 04:27 AM   HDL Cholesterol 52 09/23/2020 04:27 AM   LDL, calculated 83.8 09/23/2020 04:27 AM   VLDL, calculated 12.2 09/23/2020 04:27 AM   Triglyceride 61 09/23/2020 04:27 AM   CHOL/HDL Ratio 2.8 09/23/2020 04:27 AM    - Lipitor PTA  9. Mismatched Bp B/L UE   - R UE    - L UE    - Subclavian duplex to r/o stenosis  10. PAF   - Coreg.   - No OAC with ASA and Plavix use  11. DM   - A1c 5.7   - per Primary team  12. H/o CVA   - left lacunar infarct at head of caudate   - Follow up with Neuro as outpt. B/L edema - multifactorial, related to Extreme HTN, SBP 220s, PHTN and MR. She has been drinking increased amounts of Coca Cola in the last 2 weeks.   Also had a fall related to leg swelling and pain.  Prior echo without evidence for HF. Evidence for PHTN and MR. Lasix to diurese. Bladder scan for retention. May need a pittman. Add back home meds for HTN. []    High complexity decision making was performed  []    Patient is at high-risk of decompensation with multiple organ involvement    Review of Symptoms:  Respiratory: No exertional dyspnea, orthopnea, PND, cough, hemoptysis, URI. Cardiovascular: No CP, palpitations, sweating, lightheadedness, dizziness, syncope, presyncope.  + lower extremity swelling. *Otherwise no other pertinent positive or negative symptoms on ROS.      Patient Active Problem List    Diagnosis Date Noted    CHF (congestive heart failure) ([address] 09/22/2020    Weakness 05/04/2020    Generalized weakness 04/30/2020    Carotid artery stenosis, symptomatic, left 07/26/2019    HTN (hypertension) 07/17/2019    Acute ischemic stroke ([address] 07/12/2019    Fall 12/24/2018    CKD (chronic kidney disease), stage III ([address] 07/08/2015    Edema 05/06/2015    Diabetes mellitus ([address] 05/06/2015    Postoperative anemia due to acute blood loss 02/14/2015    S/P CABG (coronary artery bypass graft) 02/13/2015    Syncope 02/09/2015    Bradycardia 02/09/2015    MORENO (acute kidney injury) ([address] 02/09/2015    Anemia 09/09/2014    GI bleed 09/09/2014    AF (atrial fibrillation) ([address] 06/20/2014    Hypotension 06/03/2014    CAD (coronary artery disease) 06/03/2014    S/P coronary artery stent placement 06/03/2014    Renal failure 06/03/2014    Elevated troponin 06/03/2014    Pericardial effusion 06/03/2014    Lactic acidosis 06/03/2014      Lisa Quevedo DO  Past Medical History:   Diagnosis Date    Anxiety disorder     Atrial fibrillation ([address]     CAD (coronary artery disease) 2007    stents, CABG x 3v    Carotid stenosis     Cervical stenosis of spinal canal 07/2019    Chronic kidney disease     Cough     CVA (cerebral vascular accident) ([address] 07/2019    left lacunar infarct at head of caudate    Depression     AND CHRONIC ANXIETY    Diabetes (HCC)     GERD (gastroesophageal reflux disease)     High cholesterol     History of peptic ulcer     Bleeding ulcer with increased NSAID use    Hypertension     Left carotid stenosis 2019    s/p left CEA with Dr. Rakesh Mendoza, old 2007    PUD (peptic ulcer disease)     St[facility]     Tremor     Valvular heart disease       Past Surgical History:   Procedure Laterality Date    CARDIAC SURG PROCEDURE UNLIST      CABG X3 VESSEL    HX CAROTID ENDARTERECTOMY  2019    HX CORONARY ARTERY BYPASS GRAFT      HX TONSILLECTOMY  1963    TOTAL KNEE ARTHROPLASTY Right 2015     Social History     Socioeconomic History    Marital status: SINGLE     Spouse name: Not on file    Number of children: Not on file    Years of education: Not on file    Highest education level: Not on file   Occupational History    Occupation: Retired realestate/teacher   Tobacco Use    Smoking status: Former Smoker     Packs/day: 0.25     Years: 5.00     Pack years: 1.25     Types: Cigarettes     Last attempt to quit:      Years since quittin.7    Smokeless tobacco: Never Used   Substance and Sexual Activity    Alcohol use:  Yes     Alcohol/week: 0.0 standard drinks     Comment: Rare    Drug use: No   Social History Narrative    Lives in [place] alone     Family History   Problem Relation Age of Onset    Heart Attack Mother 72        Dec 89yo    Hypertension Mother     Other Father         Unknown    Parkinson's Disease Brother     Anesth Problems Neg Hx       Current Facility-Administered Medications   Medication Dose Route Frequency    carvediloL (COREG) tablet 3.125 mg  3.125 mg Oral BID WITH MEALS    docusate sodium (COLACE) capsule 100 mg  100 mg Oral PRN    aspirin delayed-release tablet 81 mg  81 mg Oral DAILY    sodium chloride (NS) flush 5-40 mL  5-40 mL IntraVENous Q8H    sodium chloride (NS) flush 5-40 mL 5-40 mL IntraVENous PRN    acetaminophen (TYLENOL) tablet 650 mg  650 mg Oral Q6H PRN    Or    acetaminophen (TYLENOL) suppository 650 mg  650 mg Rectal Q6H PRN    polyethylene glycol (MIRALAX) packet 17 g  17 g Oral DAILY PRN    promethazine (PHENERGAN) tablet 12.5 mg  12.5 mg Oral Q6H PRN    Or    ondansetron (ZOFRAN) injection 4 mg  4 mg IntraVENous Q6H PRN    enoxaparin (LOVENOX) injection 40 mg  40 mg SubCUTAneous DAILY    carbidopa-levodopa (SINEMET)  mg per tablet 2 Tab  2 Tab Oral QID    atorvastatin (LIPITOR) tablet 40 mg  40 mg Oral QHS    clopidogreL (PLAVIX) tablet 75 mg  75 mg Oral DAILY AFTER BREAKFAST    DULoxetine (CYMBALTA) capsule 120 mg  120 mg Oral DAILY    hydrALAZINE (APRESOLINE) 20 mg/mL injection 10 mg  10 mg IntraVENous Q6H PRN    furosemide (LASIX) injection 40 mg  40 mg IntraVENous BID    ceFAZolin (ANCEF) 1 g in 0.9% sodium chloride (MBP/ADV) 50 mL  1 g IntraVENous Q12H    [START ON 9/24/2020] famotidine (PEPCID) tablet 20 mg  20 mg Oral DAILY      Prior to Admission Medications   Prescriptions Last Dose Informant Patient Reported? Taking? Cholecalciferol, Vitamin D3, 1,000 unit cap   Yes No   Sig: Take 1,000 Units by mouth daily. DULoxetine (CYMBALTA) 60 mg capsule   Yes No   Sig: Take 120 mg by mouth daily. Indications: Anxiousness associated with Depression   acetaminophen (TYLENOL) 325 mg tablet   Yes No   Sig: Take 650 mg by mouth every six (6) hours as needed for Pain or Fever. aspirin delayed-release (Ecotrin Low Strength) 81 mg tablet   Yes No   Sig: Take 81 mg by mouth nightly. atorvastatin (LIPITOR) 40 mg tablet   Yes No   Sig: Take 40 mg by mouth nightly. carbidopa-levodopa (SINEMET)  mg per tablet   No No   Sig: Take 2 Tabs by mouth four (4) times daily. clopidogreL (Plavix) 75 mg tab   Yes No   Sig: Take 75 mg by mouth daily (after breakfast). cyanocobalamin (VITAMIN B12) 100 mcg tablet   Yes No   Sig: Take 100 mcg by mouth daily. diazePAM (VALIUM) 2 mg tablet   Yes No   Sig: Take 2 mg by mouth three (3) times daily. Indications: CHRONIC PAIN   multivitamins-minerals-lutein (CENTRUM SILVER) tab tablet   Yes No   Sig: Take 1 Tab by mouth daily. nitroglycerin (Nitrostat) 0.4 mg SL tablet   Yes No   Si.4 mg by SubLINGual route every five (5) minutes as needed for Chest Pain. Up to 3 doses. pantoprazole (PROTONIX) 40 mg tablet   Yes No   Sig: Take 40 mg by mouth Daily (before breakfast). Indications: h/o bleeding ulcer with nsaid   senna-docusate (SENNA WITH DOCUSATE SODIUM) 8.6-50 mg per tablet   Yes No   Sig: Take 1 Tab by mouth nightly. vit C,B-Tv-omxfi-lutein-zeaxan (PRESERVISION AREDS-2) 431-344-80-1 mg-unit-mg-mg cap capsule   Yes No   Sig: Take 1 Cap by mouth two (2) times a day. Facility-Administered Medications: None      No Known Allergies    Labs:   Recent Results (from the past 24 hour(s))   EKG, 12 LEAD, INITIAL    Collection Time: 20  5:45 PM   Result Value Ref Range    Ventricular Rate 58 BPM    Atrial Rate 58 BPM    P-R Interval 166 ms    QRS Duration 100 ms    Q-T Interval 446 ms    QTC Calculation (Bezet) 437 ms    Calculated P Axis 17 degrees    Calculated R Axis -48 degrees    Calculated T Axis 91 degrees    Diagnosis       Sinus bradycardia  Left anterior fascicular block  ST & T wave abnormality, consider lateral ischemia  When compared with ECG of 01-MAY-2020 04:42,  Sinus rhythm has replaced Atrial fibrillation  Vent.  rate has decreased BY  50 BPM  Incomplete left bundle branch block is no longer present  Confirmed by Rosana Hernandez M.D., Nuria Purdy ([id number]) on 2020 6:47:18 AM     CBC WITH AUTOMATED DIFF    Collection Time: 20  5:57 PM   Result Value Ref Range    WBC 6.1 3.6 - 11.0 K/uL    RBC 3.65 (L) 3.80 - 5.20 M/uL    HGB 10.7 (L) 11.5 - 16.0 g/dL    HCT 33.7 (L) 35.0 - 47.0 %    MCV 92.3 80.0 - 99.0 FL    MCH 29.3 26.0 - 34.0 PG    MCHC 31.8 30.0 - 36.5 g/dL    RDW 13.6 11.5 - 14.5 % PLATELET 988 060 - 592 K/uL    MPV 8.7 (L) 8.9 - 12.9 FL    NRBC 0.0 0  WBC    ABSOLUTE NRBC 0.00 0.00 - 0.01 K/uL    NEUTROPHILS 67 32 - 75 %    LYMPHOCYTES 18 12 - 49 %    MONOCYTES 10 5 - 13 %    EOSINOPHILS 4 0 - 7 %    BASOPHILS 1 0 - 1 %    IMMATURE GRANULOCYTES 0 0.0 - 0.5 %    ABS. NEUTROPHILS 4.1 1.8 - 8.0 K/UL    ABS. LYMPHOCYTES 1.1 0.8 - 3.5 K/UL    ABS. MONOCYTES 0.6 0.0 - 1.0 K/UL    ABS. EOSINOPHILS 0.2 0.0 - 0.4 K/UL    ABS. BASOPHILS 0.0 0.0 - 0.1 K/UL    ABS. IMM. GRANS. 0.0 0.00 - 0.04 K/UL    DF AUTOMATED     METABOLIC PANEL, COMPREHENSIVE    Collection Time: 09/22/20  5:57 PM   Result Value Ref Range    Sodium 138 136 - 145 mmol/L    Potassium 3.9 3.5 - 5.1 mmol/L    Chloride 107 97 - 108 mmol/L    CO2 25 21 - 32 mmol/L    Anion gap 6 5 - 15 mmol/L    Glucose 97 65 - 100 mg/dL    BUN 21 (H) 6 - 20 MG/DL    Creatinine 1.43 (H) 0.55 - 1.02 MG/DL    BUN/Creatinine ratio 15 12 - 20      GFR est AA 43 (L) >60 ml/min/1.73m2    GFR est non-AA 35 (L) >60 ml/min/1.73m2    Calcium 10.0 8.5 - 10.1 MG/DL    Bilirubin, total 0.5 0.2 - 1.0 MG/DL    ALT (SGPT) 10 (L) 12 - 78 U/L    AST (SGOT) 19 15 - 37 U/L    Alk. phosphatase 143 (H) 45 - 117 U/L    Protein, total 7.2 6.4 - 8.2 g/dL    Albumin 3.7 3.5 - 5.0 g/dL    Globulin 3.5 2.0 - 4.0 g/dL    A-G Ratio 1.1 1.1 - 2.2     TROPONIN I    Collection Time: 09/22/20  5:57 PM   Result Value Ref Range    Troponin-I, Qt. <0.05 <0.05 ng/mL   SAMPLES BEING HELD    Collection Time: 09/22/20  5:57 PM   Result Value Ref Range    SAMPLES BEING HELD 1RED,1BLUE     COMMENT        Add-on orders for these samples will be processed based on acceptable specimen integrity and analyte stability, which may vary by analyte.    NT-PRO BNP    Collection Time: 09/22/20  5:57 PM   Result Value Ref Range    NT pro-BNP 1,561 (H) <450 PG/ML   URINALYSIS W/ REFLEX CULTURE    Collection Time: 09/22/20  6:12 PM    Specimen: Urine   Result Value Ref Range    Color YELLOW/STRAW Appearance CLEAR CLEAR      Specific gravity 1.009 1.003 - 1.030      pH (UA) 7.0 5.0 - 8.0      Protein 100 (A) NEG mg/dL    Glucose Negative NEG mg/dL    Ketone Negative NEG mg/dL    Bilirubin Negative NEG      Blood Negative NEG      Urobilinogen 0.2 0.2 - 1.0 EU/dL    Nitrites Negative NEG      Leukocyte Esterase TRACE (A) NEG      UA:UC IF INDICATED CULTURE NOT INDICATED BY UA RESULT CNI      WBC 0-4 0 - 4 /hpf    RBC 0-5 0 - 5 /hpf    Epithelial cells FEW FEW /lpf    Bacteria Negative NEG /hpf    Hyaline cast 0-2 0 - 5 /lpf   TSH 3RD GENERATION    Collection Time: 09/23/20  4:27 AM   Result Value Ref Range    TSH 3.22 0.36 - 3.74 uIU/mL   T4, FREE    Collection Time: 09/23/20  4:27 AM   Result Value Ref Range    T4, Free 1.1 0.8 - 1.5 NG/DL   LIPID PANEL    Collection Time: 09/23/20  4:27 AM   Result Value Ref Range    LIPID PROFILE          Cholesterol, total 148 <200 MG/DL    Triglyceride 61 <150 MG/DL    HDL Cholesterol 52 MG/DL    LDL, calculated 83.8 0 - 100 MG/DL    VLDL, calculated 12.2 MG/DL    CHOL/HDL Ratio 2.8 0.0 - 5.0     EKG, 12 LEAD, INITIAL    Collection Time: 09/23/20  4:43 AM   Result Value Ref Range    Ventricular Rate 54 BPM    Atrial Rate 54 BPM    P-R Interval 172 ms    QRS Duration 110 ms    Q-T Interval 474 ms    QTC Calculation (Bezet) 449 ms    Calculated P Axis 31 degrees    Calculated R Axis -53 degrees    Calculated T Axis 126 degrees    Diagnosis       Sinus bradycardia  Left anterior fascicular block  Left ventricular hypertrophy with repolarization abnormality  When compared with ECG of 22-SEP-2020 17:45,  No significant change was found  Confirmed by Crystal Wilson M.D., Hemanth Fish ([id number]) on 9/23/2020 7:35:21 AM         Intake/Output Summary (Last 24 hours) at 9/23/2020 1107  Last data filed at 9/23/2020 0730  Gross per 24 hour   Intake 420 ml   Output    Net 420 ml      Patient Vitals for the past 24 hrs:   Temp Pulse Resp BP SpO2   09/23/20 0730  (!) 56      09/23/20 0706 98.6 °F (37 °C) 65 18 (!) 160/46 95 %   09/23/20 0339 98.7 °F (37.1 °C) (!) 57 19 (!) 213/57 98 %   09/23/20 0300  (!) 48 19 (!) 201/52 97 %   09/23/20 0245  (!) 49 18 (!) 196/56 97 %   09/23/20 0230  (!) 50 16 (!) 186/59 98 %   09/23/20 0215  (!) 54 20 (!) 177/56 97 %   09/23/20 0200  (!) 49 19 (!) 198/49 96 %   09/22/20 2245  (!) 55 17 (!) 201/59 96 %   09/22/20 2230  (!) 52 17 (!) 196/64 96 %   09/22/20 2215  (!) 51 15 (!) 207/68 97 %   09/22/20 2200  (!) 49 17 (!) 204/61 100 %   09/22/20 2149    (!) 223/51 100 %   09/22/20 1737 98.8 °F (37.1 °C) 63 20 (!) 192/73 98 %        General:    Alert, cooperative, no distress. C/o feet pain   Psychiatric:   Normal Mood and slow affect    Eye/ENT:   Pupils equal, No asymmetry, Conjunctival pink. Able to hear voice at normal amplitude   Lungs:   Visibly symmetric chest expansion, No palpable tenderness. Clear to auscultation bilaterally. Heart:    Regular rate and rhythm, S1, S2 normal, no murmur, click, rub or gallop. No JVD, Normal palpable     peripheral pulses. No cyanosis. 2+ radial pulses B/L   Abdomen:    Soft, non-tender. Bowel sounds normal. No masses,  No organomegaly. Extremities:   Extremities normal, atraumatic. 2+ edema B/L LE   Neurologic:   CN II-XII grossly intact. No gross focal deficits       Rona Porras.  Jamison Berger MD  9/23/2020   11:07 AM      [facility] Office:   [facility] Office:  [address] [place]  [address]     [facility], [address] [place] [address]  P: [phone]    P: [phone]  F: [phone]    F: [phone]

## 2020-09-23 NOTE — PROGRESS NOTES
Problem: Pressure Injury - Risk of  Goal: *Prevention of pressure injury  Description: Document Gregorio Scale and appropriate interventions in the flowsheet. Outcome: Progressing Towards Goal  Note: Pressure Injury Interventions:       Moisture Interventions: Absorbent underpads, Internal/External urinary devices    Activity Interventions: Increase time out of bed, Pressure redistribution bed/mattress(bed type), PT/OT evaluation    Mobility Interventions: HOB 30 degrees or less, Pressure redistribution bed/mattress (bed type), PT/OT evaluation    Nutrition Interventions: Document food/fluid/supplement intake                     Problem: Patient Education: Go to Patient Education Activity  Goal: Patient/Family Education  Outcome: Progressing Towards Goal     Problem: Falls - Risk of  Goal: *Absence of Falls  Description: Document Maylin Fall Risk and appropriate interventions in the flowsheet.   Outcome: Progressing Towards Goal  Note: Fall Risk Interventions:  Mobility Interventions: Communicate number of staff needed for ambulation/transfer, OT consult for ADLs, Patient to call before getting OOB, PT Consult for mobility concerns         Medication Interventions: Evaluate medications/consider consulting pharmacy, Patient to call before getting OOB, Teach patient to arise slowly    Elimination Interventions: Call light in reach, Patient to call for help with toileting needs    History of Falls Interventions: Door open when patient unattended, Investigate reason for fall         Problem: Patient Education: Go to Patient Education Activity  Goal: Patient/Family Education  Outcome: Progressing Towards Goal     Problem: Pain  Goal: *Control of Pain  Outcome: Progressing Towards Goal  Goal: *PALLIATIVE CARE:  Alleviation of Pain  Outcome: Progressing Towards Goal     Problem: Patient Education: Go to Patient Education Activity  Goal: Patient/Family Education  Outcome: Progressing Towards Goal

## 2020-09-23 NOTE — NURSE NAVIGATOR
Chart reviewed by Heart Failure Nurse Navigator. Heart Failure database completed. EF:  55-60%  (echo dated 5/4/20)     ACEi/ARB/ARNi: currently not indicated    BB: Coreg 3.125mg BID initiated    Aldosterone Antagonist: currently not indicated    Obstructive Sleep Apnea Screening: screening priority 1   STOP-BANG score:   Referred to Sleep Medicine:     CRT currently not indicated. NYHA Functional Class not assigned. Documentation requested    Heart Failure Teach Back in Patient Education. Heart Failure Avoiding Triggers on Discharge Instructions. Cardiologist: CAV-consult Pending      Post discharge follow up phone call to be made within 48-72 hours of discharge.

## 2020-09-23 NOTE — PROGRESS NOTES
TRANSFER - IN REPORT @ [id number]    Verbal report received from Tesfaye Renee (name) on Dagoberto Green  being received from [facility] in [place]  (unit) for routine progression of care      Report consisted of patients Situation, Background, Assessment and   Recommendations(SBAR). Information from the following report(s) SBAR, Kardex and MAR was reviewed with the receiving nurse. Opportunity for questions and clarification was provided. Assessment completed upon patients arrival to unit and care assumed. 1600 -- Bedside shift change report given to Randy (oncoming nurse) by Prosper Rodriguez (offgoing nurse). Report included the following information SBAR, Kardex, Procedure Summary and MAR.

## 2020-09-23 NOTE — PROGRESS NOTES
Problem: Self Care Deficits Care Plan (Adult)  Goal: *Acute Goals and Plan of Care (Insert Text)  Description:   FUNCTIONAL STATUS PRIOR TO ADMISSION: Patient was modified independent using a rolling walker for functional mobility. Pt stopped driving months ago   Pt reports having an aide come to her house 1x per week for 4 hours. HOME SUPPORT: The patient lived alone with a niece who occasionally was able to provide assistance. Occupational Therapy Goals  Initiated 9/23/2020  1. Patient will perform grooming standing at sink with modified independence within 7 day(s). 2.  Patient will perform bathing with supervision/set-up within 7 day(s). 3.  Patient will perform lower body dressing using AE with modified independence within 7 day(s). 4.  Patient will perform toilet transfers with modified independence within 7 day(s). 5.  Patient will perform all aspects of toileting with modified independence within 7 day(s). 6.  Patient will utilize energy conservation techniques during functional activities with verbal cues within 7 day(s). Outcome: Progressing Towards Goal   OCCUPATIONAL THERAPY EVALUATION  Patient: Dagoberto Green (62 y.o. female)  Date: 9/23/2020  Primary Diagnosis: CHF (congestive heart failure) ([facility] [I50.9]        Precautions:   Fall    ASSESSMENT  Based on the objective data described below, the patient presents with decrease balance, decrease mobility, painful feet, decrease B UE AROM, decrease activity tolerance, Parkinson's and decrease ability to complete self-care tasks. Pt presents at min to mod assist with mobility and max assist with LB self-care. PTA, pt living home alone and struggling with ADLs. Pt stated, \"I dont' want to got to rehab! \"  Educated pt the importance of rehab in order to safely return home to care for self. Pt was frustrated and felt that once her feet stop hurting she would be safe to return home.   At this time, feel pt is not safe to return home and would benefit from further rehab. Will con't to follow and address listed goals. Current Level of Function Impacting Discharge (ADLs/self-care): max assist with toileting (hygiene) and LB self-care    Functional Outcome Measure: The patient scored 50/100 on the Barthel outcome measure which is indicative of impairment. Other factors to consider for discharge: lives alone     Patient will benefit from skilled therapy intervention to address the above noted impairments. PLAN :  Recommendations and Planned Interventions: self care training, functional mobility training, balance training, therapeutic activities, endurance activities, patient education, and home safety training    Frequency/Duration: Patient will be followed by occupational therapy 5 times a week to address goals. Recommendation for discharge: (in order for the patient to meet his/her long term goals)  Therapy 3 hours per day 5-7 days per week    This discharge recommendation:  A follow-up discussion with the attending provider and/or case management is planned    IF patient discharges home will need the following DME: TBD       SUBJECTIVE:   Patient stated My feet hurt. 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 Tobias Donis    MI, old 2007    PUD (peptic ulcer disease)     Stroke [facility])     Tremor     Valvular heart disease Past Surgical History:   Procedure Laterality Date    CARDIAC SURG PROCEDURE UNLIST      CABG X3 VESSEL    HX CAROTID ENDARTERECTOMY  07/2019    HX CORONARY ARTERY BYPASS GRAFT      HX TONSILLECTOMY  1963    TOTAL KNEE ARTHROPLASTY Right 2015       Expanded or extensive additional review of patient history:     Home Situation  Home Environment: Apartment  # Steps to Enter: 0  One/Two Story Residence: One story  Living Alone: Yes  Support Systems: Family member(s)  Patient Expects to be Discharged to[de-identified] Apartment  Current DME Used/Available at Home: Walker, rolling        EXAMINATION OF PERFORMANCE DEFICITS:  Cognitive/Behavioral Status:  Neurologic State: Alert  Orientation Level: Oriented X4  Cognition: Follows commands;Decreased attention/concentration; Appropriate safety awareness  Perception: Appears intact     Safety/Judgement: Awareness of environment    Skin: impaired B LEs    Edema: B LEs    Hearing: Auditory  Auditory Impairment: None    Vision/Perceptual:                                     Range of Motion:    AROM: Generally decreased, functional                         Strength:    Strength: Generally decreased, functional                Coordination:  Coordination: Within functional limits(tremors noted from Parkinsons)  Fine Motor Skills-Upper: Left Intact; Right Intact    Gross Motor Skills-Upper: Left Intact; Right Intact    Tone & Sensation:    Tone: Normal  Sensation: Impaired(tingling victor manuel feet; intact to light touch)                      Balance:  Sitting: Intact; Without support  Standing: Impaired; Without support  Standing - Static: Constant support  Standing - Dynamic : Constant support    Functional Mobility and Transfers for ADLs:  Bed Mobility:       Transfers:  Sit to Stand: Minimum assistance;Assist x1;Adaptive equipment; Additional time  Stand to Sit: Minimum assistance;Assist x1;Adaptive equipment; Additional time  Bed to Chair: Minimum assistance;Assist x1;Additional time  Bathroom Mobility: Minimum assistance  Toilet Transfer : Minimum assistance; Additional time;Assist x1    ADL Assessment:  Feeding: Setup    Oral Facial Hygiene/Grooming: Setup    Bathing: Maximum assistance    Upper Body Dressing: Minimum assistance    Lower Body Dressing: Maximum assistance    Toileting: Maximum assistance                ADL Intervention and task modifications:         Cognitive Retraining  Safety/Judgement: Awareness of environment      Functional Measure:  Barthel Index:    Bathin  Bladder: 5  Bowels: 10  Groomin  Dressin  Feeding: 10  Mobility: 0  Stairs: 0  Toilet Use: 5  Transfer (Bed to Chair and Back): 10  Total: 50/100        The Barthel ADL Index: Guidelines  1. The index should be used as a record of what a patient does, not as a record of what a patient could do. 2. The main aim is to establish degree of independence from any help, physical or verbal, however minor and for whatever reason. 3. The need for supervision renders the patient not independent. 4. A patient's performance should be established using the best available evidence. Asking the patient, friends/relatives and nurses are the usual sources, but direct observation and common sense are also important. However direct testing is not needed. 5. Usually the patient's performance over the preceding 24-48 hours is important, but occasionally longer periods will be relevant. 6. Middle categories imply that the patient supplies over 50 per cent of the effort. 7. Use of aids to be independent is allowed. Dakota Reed., Barthel, D.W. (4158). Functional evaluation: the Barthel Index. [address] [facility] (14)2. FANTA Ledezma, Lilian Naylor., Sabina Nam, [place], [address] [place] [address] Ave (). Measuring the change indisability after inpatient rehabilitation; comparison of the responsiveness of the Barthel Index and Functional [place] Measure. Journal of Neurology, Neurosurgery, and Psychiatry, 66(4), 160-924.   PURVI Edouard.ELAINE, David Barrera M.A. (2004.) Assessment of post-stroke quality of life in cost-effectiveness studies: The usefulness of the Barthel Index and the EuroQoL-5D. Quality of Life Research, 15, 026-47        Occupational Therapy Evaluation Charge Determination   History Examination Decision-Making   LOW Complexity : Brief history review  MEDIUM Complexity : 3-5 performance deficits relating to physical, cognitive , or psychosocial skils that result in activity limitations and / or participation restrictions MEDIUM Complexity : Patient may present with comorbidities that affect occupational performnce. Miniml to moderate modification of tasks or assistance (eg, physical or verbal ) with assesment(s) is necessary to enable patient to complete evaluation       Based on the above components, the patient evaluation is determined to be of the following complexity level: LOW   Pain Rating:  Painful feet, no verbal score given    Activity Tolerance:   Fair  Please refer to the flowsheet for vital signs taken during this treatment. After treatment patient left in no apparent distress:    Sitting in chair and Call bell within reach    COMMUNICATION/EDUCATION:   The patients plan of care was discussed with: Physical therapist and Registered nurse. Home safety education was provided and the patient/caregiver indicated understanding., Patient/family have participated as able in goal setting and plan of care. , and Patient/family agree to work toward stated goals and plan of care. This patients plan of care is appropriate for delegation to [facility].     Thank you for this referral.  Lizeth Rudd, OTR/L  Time Calculation: 35 mins

## 2020-09-24 LAB
ANION GAP SERPL CALC-SCNC: 5 MMOL/L (ref 5–15)
BUN SERPL-MCNC: 30 MG/DL (ref 6–20)
BUN/CREAT SERPL: 14 (ref 12–20)
CALCIUM SERPL-MCNC: 9.1 MG/DL (ref 8.5–10.1)
CHLORIDE SERPL-SCNC: 104 MMOL/L (ref 97–108)
CO2 SERPL-SCNC: 28 MMOL/L (ref 21–32)
CREAT SERPL-MCNC: 2.16 MG/DL (ref 0.55–1.02)
GLUCOSE BLD STRIP.AUTO-MCNC: 122 MG/DL (ref 65–100)
GLUCOSE BLD STRIP.AUTO-MCNC: 132 MG/DL (ref 65–100)
GLUCOSE SERPL-MCNC: 103 MG/DL (ref 65–100)
POTASSIUM SERPL-SCNC: 3.6 MMOL/L (ref 3.5–5.1)
SERVICE CMNT-IMP: ABNORMAL
SERVICE CMNT-IMP: ABNORMAL
SODIUM SERPL-SCNC: 137 MMOL/L (ref 136–145)

## 2020-09-24 PROCEDURE — 96372 THER/PROPH/DIAG INJ SC/IM: CPT

## 2020-09-24 PROCEDURE — 97530 THERAPEUTIC ACTIVITIES: CPT | Performed by: OCCUPATIONAL THERAPIST

## 2020-09-24 PROCEDURE — 74011250637 HC RX REV CODE- 250/637: Performed by: HOSPITALIST

## 2020-09-24 PROCEDURE — 74011000258 HC RX REV CODE- 258: Performed by: HOSPITALIST

## 2020-09-24 PROCEDURE — 82962 GLUCOSE BLOOD TEST: CPT

## 2020-09-24 PROCEDURE — 99233 SBSQ HOSP IP/OBS HIGH 50: CPT | Performed by: INTERNAL MEDICINE

## 2020-09-24 PROCEDURE — 74011250637 HC RX REV CODE- 250/637: Performed by: PHYSICIAN ASSISTANT

## 2020-09-24 PROCEDURE — 97535 SELF CARE MNGMENT TRAINING: CPT | Performed by: OCCUPATIONAL THERAPIST

## 2020-09-24 PROCEDURE — 2709999900 HC NON-CHARGEABLE SUPPLY

## 2020-09-24 PROCEDURE — 96376 TX/PRO/DX INJ SAME DRUG ADON: CPT

## 2020-09-24 PROCEDURE — 74011250636 HC RX REV CODE- 250/636: Performed by: HOSPITALIST

## 2020-09-24 PROCEDURE — 99218 HC RM OBSERVATION: CPT

## 2020-09-24 PROCEDURE — 74011250636 HC RX REV CODE- 250/636: Performed by: INTERNAL MEDICINE

## 2020-09-24 PROCEDURE — 80048 BASIC METABOLIC PNL TOTAL CA: CPT

## 2020-09-24 PROCEDURE — 97116 GAIT TRAINING THERAPY: CPT

## 2020-09-24 PROCEDURE — 77030038269 HC DRN EXT URIN PURWCK BARD -A

## 2020-09-24 PROCEDURE — 36415 COLL VENOUS BLD VENIPUNCTURE: CPT

## 2020-09-24 RX ORDER — ENOXAPARIN SODIUM 100 MG/ML
30 INJECTION SUBCUTANEOUS DAILY
Status: DISCONTINUED | OUTPATIENT
Start: 2020-09-25 | End: 2020-09-30

## 2020-09-24 RX ORDER — HYDRALAZINE HYDROCHLORIDE 50 MG/1
50 TABLET, FILM COATED ORAL 3 TIMES DAILY
Status: DISCONTINUED | OUTPATIENT
Start: 2020-09-24 | End: 2020-09-24

## 2020-09-24 RX ORDER — HYDRALAZINE HYDROCHLORIDE 25 MG/1
25 TABLET, FILM COATED ORAL 3 TIMES DAILY
Status: DISCONTINUED | OUTPATIENT
Start: 2020-09-24 | End: 2020-09-25

## 2020-09-24 RX ORDER — HYDROCODONE BITARTRATE AND ACETAMINOPHEN 7.5; 325 MG/1; MG/1
1 TABLET ORAL
Status: DISCONTINUED | OUTPATIENT
Start: 2020-09-24 | End: 2020-10-04 | Stop reason: HOSPADM

## 2020-09-24 RX ADMIN — CARBIDOPA AND LEVODOPA 2 TABLET: 25; 100 TABLET ORAL at 21:43

## 2020-09-24 RX ADMIN — CARVEDILOL 3.12 MG: 3.12 TABLET, FILM COATED ORAL at 18:24

## 2020-09-24 RX ADMIN — Medication 10 ML: at 18:24

## 2020-09-24 RX ADMIN — ENOXAPARIN SODIUM 40 MG: 40 INJECTION SUBCUTANEOUS at 08:23

## 2020-09-24 RX ADMIN — HYDRALAZINE HYDROCHLORIDE 10 MG: 20 INJECTION INTRAMUSCULAR; INTRAVENOUS at 04:00

## 2020-09-24 RX ADMIN — CARBIDOPA AND LEVODOPA 2 TABLET: 25; 100 TABLET ORAL at 12:43

## 2020-09-24 RX ADMIN — CLOPIDOGREL BISULFATE 75 MG: 75 TABLET ORAL at 08:23

## 2020-09-24 RX ADMIN — HYDRALAZINE HYDROCHLORIDE 25 MG: 25 TABLET, FILM COATED ORAL at 21:43

## 2020-09-24 RX ADMIN — AMLODIPINE BESYLATE 5 MG: 5 TABLET ORAL at 08:23

## 2020-09-24 RX ADMIN — DULOXETINE 120 MG: 60 CAPSULE, DELAYED RELEASE ORAL at 08:23

## 2020-09-24 RX ADMIN — Medication 10 ML: at 21:44

## 2020-09-24 RX ADMIN — FAMOTIDINE 20 MG: 20 TABLET ORAL at 08:23

## 2020-09-24 RX ADMIN — CEFAZOLIN SODIUM 1 G: 1 INJECTION, POWDER, FOR SOLUTION INTRAMUSCULAR; INTRAVENOUS at 04:00

## 2020-09-24 RX ADMIN — CARBIDOPA AND LEVODOPA 2 TABLET: 25; 100 TABLET ORAL at 18:24

## 2020-09-24 RX ADMIN — FUROSEMIDE 40 MG: 10 INJECTION, SOLUTION INTRAMUSCULAR; INTRAVENOUS at 08:24

## 2020-09-24 RX ADMIN — CARBIDOPA AND LEVODOPA 2 TABLET: 25; 100 TABLET ORAL at 08:22

## 2020-09-24 RX ADMIN — HYDRALAZINE HYDROCHLORIDE 25 MG: 25 TABLET, FILM COATED ORAL at 18:24

## 2020-09-24 RX ADMIN — CARVEDILOL 3.12 MG: 3.12 TABLET, FILM COATED ORAL at 08:23

## 2020-09-24 RX ADMIN — ASPIRIN 81 MG: 81 TABLET, COATED ORAL at 08:22

## 2020-09-24 RX ADMIN — ATORVASTATIN CALCIUM 40 MG: 40 TABLET, FILM COATED ORAL at 08:23

## 2020-09-24 RX ADMIN — CEFAZOLIN SODIUM 1 G: 1 INJECTION, POWDER, FOR SOLUTION INTRAMUSCULAR; INTRAVENOUS at 18:27

## 2020-09-24 RX ADMIN — Medication 10 ML: at 06:47

## 2020-09-24 RX ADMIN — ACETAMINOPHEN 650 MG: 325 TABLET ORAL at 09:29

## 2020-09-24 NOTE — PROGRESS NOTES
TRANSITIONS OF CARE PLAN:   1. DESTINATION: Home with [facility] [place] vs SNF placement  2. TRANSPORT: BLS transport  3. ADDITIONAL SUPPORT: Niece  4. DME: walker, bsc, shower chair, elevated toilet seat  5. HOME HEALTH:  Prior HH, possibly [facility] for [facility] [place] vs SNF placement  6. CODE STATUS/AMD STATUS: FULL code, AMD on file. 7. FOLLOW UP APPOINTMENTS: TBD  8. STILL NEEDS: diuresis, cardiology recommendations, PT & OT evaluation. RRAT: 30  Reason for Admission: CHF                 RUR Score:     RRAT 30%    PCP: First and Last name: Dr Joelle Suazo   Name of Practice:   Are you a current patient: YES Yes/No:   Approximate date of last visit: pt stated she last saw PCP 6-8 months ago. Can you do a virtual visit with your PCP:  NO             Resources/supports as identified by patient/family:   [facility] [phone]                Top Challenges facing patient (as identified by patient/family and CM): Finances/Medication cost?  Pt uses [facility], which delivers meds to pt's home. Pt denies copay concerns for meds. Transportation? Pt uses [employer] for [employer] system or lack thereof? Pt resides alone in an apartment at [facility]. Niece lives nearby but works and travels frequently as per pt. Living arrangements? Pt resides alone in an apartment at [facility]. Self-care/ADLs/Cognition? Pt advised she is independent with ADLS. Pt stated she as not as \"quick\" with ADLS as when she was younger. Pt AOx4. Current Advanced Directive/Advance Care Plan:   Full Code, Amd on file. Plan for utilizing home health: Pt previously had [facility] following a d/c from [id number] [facility] rehab placement. Plan for [facility] vs. SNF contingent upon therapy recommendations.                  Transition of Care Plan:                CM reviewed pt chart & met with pt at bedside. Pt advised she resides at [facility] [facility] in a 1st floor apartment, no steps. Pt reported she is independent with ADLS, but not as \"quick\" as when she was younger. Pt reported she has had some recent falls. More specifically, pt reported recent fall, in which, she fell on her face. Pt reported that she had [facility] services with therapy recently but does not recall name of provider. As per chart record, pt had SNF placement stay for rehab in May 2020. Pt reported dme of: walker, bsc, shower chair, & elevated toilet seat. Pt uses [facility](mail order with no copay concerns. Pt uses [employer] for transportation but reported that cost can be expensive. Pt's niece (Olegario Christianson) resides nearby, but pt stated she travels a lot with work. Pt advised she will need assistance with transport at d/c.   Care Management Interventions  PCP Verified by CM: Yes(Pt stated she last saw pcp(Dr. Melina Shine) 6-8 months ago.)  Palliative Care Criteria Met (RRAT>21 & CHF Dx)?: Yes  Palliative Consult Recommended?: Yes  Mode of Transport at Discharge: BLS  Transition of Care Consult (CM Consult): Discharge Planning  MyChart Signup: No  Discharge Durable Medical Equipment: No  Health Maintenance Reviewed: Yes(CM met pt at bedside, AOx4)  Physical Therapy Consult: Yes  Occupational Therapy Consult: Yes  Speech Therapy Consult: No  Current Support Network: Lives Alone  The Plan for Transition of Care is Related to the Following Treatment Goals : diuresis, cardiology recommendations & therapy evaluation  [employer] Information Provided?: No  Discharge Location  Discharge Placement: Skilled nursing facility  Dipak Hawkins RN BSN  CRM

## 2020-09-24 NOTE — PROGRESS NOTES
Problem: Self Care Deficits Care Plan (Adult)  Goal: *Acute Goals and Plan of Care (Insert Text)  Description:   FUNCTIONAL STATUS PRIOR TO ADMISSION: Patient was modified independent using a rolling walker for functional mobility. Pt stopped driving months ago   Pt reports having an aide come to her house 1x per week for 4 hours. HOME SUPPORT: The patient lived alone with a niece who occasionally was able to provide assistance. Occupational Therapy Goals  Initiated 9/23/2020  1. Patient will perform grooming standing at sink with modified independence within 7 day(s). 2.  Patient will perform bathing with supervision/set-up within 7 day(s). 3.  Patient will perform lower body dressing using AE with modified independence within 7 day(s). 4.  Patient will perform toilet transfers with modified independence within 7 day(s). 5.  Patient will perform all aspects of toileting with modified independence within 7 day(s). 6.  Patient will utilize energy conservation techniques during functional activities with verbal cues within 7 day(s). Outcome: Progressing Towards Goal    OCCUPATIONAL THERAPY TREATMENT  Patient: Kristin Rubin (89 y.o. female)  Date: 9/24/2020  Diagnosis: CH[facility]congestive heart failure[facility] [I50.9]   <principal problem not specified>       Precautions: Fall  Chart, occupational therapy assessment, plan of care, and goals were reviewed. ASSESSMENT  Patient continues with skilled OT services and is progressing towards goals. Patient with very sensitive bilateral LE's and left heel pain, no wound noted, recommend mobility with rubber bottom slippers which she has here, however declined this date. Patient with heavy reliance on pur-wic and hx of incontinence, recommend decreased use as she lives alone and will need to go to the bathroom at home. Patient transfer to bed from chair with increased time due to very short steps and per her report due to pain. ?  Safety home alone Current Level of Function Impacting Discharge (ADLs): total assist toileting, CGA for stand pivot transfers with rolling walker    Other factors to consider for discharge: lives alone         PLAN :  Patient continues to benefit from skilled intervention to address the above impairments. Continue treatment per established plan of care. to address goals. Recommend with staff: use of bedside commode for toileting at least during day, float bilateral heels    Recommend next OT session: transfer into bathroom for functional activity    Recommendation for discharge: (in order for the patient to meet his/her long term goals)  Occupational therapy at least 2 days/week in the home AND ensure assist and/or supervision for safety with IADL's and bathing    This discharge recommendation:  Has not yet been discussed the attending provider and/or case management    IF patient discharges home will need the following DME:        SUBJECTIVE:   Patient stated Gallegos Blonder my legs they hurt.  stated with any touch to LE's    OBJECTIVE DATA SUMMARY:   Cognitive/Behavioral Status:  Neurologic State: Alert  Orientation Level: Oriented X4  Cognition: Follows commands; Appropriate for age attention/concentration  Perception: Appears intact  Perseveration: No perseveration noted  Safety/Judgement: Awareness of environment; Fall prevention;Home safety    Functional Mobility and Transfers for ADLs:  Bed Mobility:  Supine to Sit: Modified independent; Adaptive equipment  Scooting: Supervision; Additional time    Transfers:  Sit to Stand: Contact guard assistance;Assist x1;Additional time; Adaptive equipment(attempts to pull up on walker )  Functional Transfers  Toilet Transfer : Other (comment)(declined and demand pur-wic)       Balance:  Sitting: Intact; Without support  Standing: Intact; With support(rolling walker)  Standing - Static: Constant support  Standing - Dynamic : Constant support    ADL Intervention:     Educated on positioning in supine to float bilateral heels and placed 2 vertical pillows under each LE        Toileting  Toileting Assistance: Total assistance(dependent)(relying on pur-wic and incontinent urine)  Bladder Hygiene: Contact guard assistance; Compensatory technique training(in standing)  Clothing Management: Total assistance (dependent)(protective undergarment)  Adaptive Equipment: Walker  Completed camilo-care and change of brief in standing and in supine due to incontincence    Educated on benefit of walking to and from bathroom with assist or even bedside commode due to reliance on pur-wic    Educated on use of her [employer] slippers to increase support for left heel due to complaint of left heel pain    Cognitive Retraining  Safety/Judgement: Awareness of environment; Fall prevention;Home safety        Pain:  Bilateral LE's, left heel > right    Activity Tolerance:   Poor  Please refer to the flowsheet for vital signs taken during this treatment. After treatment patient left in no apparent distress:   Supine in bed, Heels elevated for pressure relief, Call bell within reach, and Side rails x 3    COMMUNICATION/COLLABORATION:   The patients plan of care was discussed with: Registered nurse.      Toby Conn OTR/L  Time Calculation: 38 mins

## 2020-09-24 NOTE — PROGRESS NOTES
Cardiology Consult/Progress Note           9/22/2020  6:56 PM   CHF (congestive heart failure) ([facility] [I50.9]     HPI:   Kristin Rubin is a 79 y.o. female admitted for CHF (congestive heart failure) ([facility] [I50.9]. She presented to the ER with complaints of bilateral lower extremity swelling. She notes that it is been slow in onset occurring over the last week or 2. She denies any chest pain or shortness of breath during this time. She admitted to the ER physician that she has been drinking more Coca-Cola in the last 2 weeks and also had a fall within the last 2 weeks. She says it is difficult to walk with her swollen legs; she also states that her feet and legs hurt as well. Past medical history for coronary artery disease with bypass, left carotid endarterectomy, hypertension, dyslipidemia, and chronic kidney disease. Investigation:  Telemetry: normal sinus rhythm     ECG: Sinus bradycardia  Left anterior fascicular block  Left ventricular hypertrophy with repolarization abnormality  When compared with ECG of 22-SEP-2020 17:45,   No significant change was found      Echocardiogram:   04/30/20   ECHO ADULT COMPLETE 05/04/2020 5/4/2020    Narrative · Image quality for this study was technically difficult. · Normal cavity size and systolic function (ejection fraction normal). Upper normal wall thickness. Estimated left ventricular ejection fraction   is 55 - 60%. No regional wall motion abnormality noted. Mild (grade 1)   left ventricular diastolic dysfunction. · Moderately dilated left atrium. · Mildly reduced systolic function. · Mildly dilated right atrium. · Mild aortic valve regurgitation is present. · Mitral valve non-specific thickening. Moderate mitral annular   calcification. Moderate mitral valve regurgitation is present. · Mild tricuspid valve regurgitation is present. · Moderate pulmonary hypertension.  Pulmonary arterial systolic pressure is   50 mmHg. Signed by: Carrie Arnold MD       Subjective:  OOB to chair. Feeling better. Leg edema greatly improved. C/o left knee pain. B/L LE across shins TTP. Assessment and Plan     1. B/L LE edema   - Improved   - Lasix held for Cr. Bump. 2. HTN    - BP greatly improved   - Hydralazine decreased to 25 mg TID   - continue Coreg and Norvasc  3. PHTN    - PAS on echo 50 mmHg   - Moderate  4. MR   - Moderate on echo   - moderate LA dilation on echo  5. CKD   - Baseline 1.2 - 1.4   - Stage III - follow labs with diuresis  6. CAD   - s/p CABG x 3   - Coreg, Lipitor, Plavix, ASA  04/30/20   NUCLEAR CARDIAC STRESS TEST 05/05/2020, 05/05/2020 5/5/2020    Narrative · Baseline ECG: Normal sinus rhythm, non-specific ST-T wave abnormalities. · NM: No ischemia. Possible small distal anterior infarct. LVEF 53%. Rest and Lexiscan myocardial perfusion SPECT imaging is performed with IV   injections of 10.6 and 32.9 mCi technetium 99m Sestamibi respectively. SPECT images displayed in three planes show no ischemic reversibility. There is a small focus of distal anterior wall fixed thinning, possible   infarct. Gated SPECT images reviewed in 3 planes show unremarkable LV systolic wall   thickening. There is no segmental wall motion abnormality of the left   ventricular myocardium. The calculated LV ejection fraction is 53%. Pulmonary uptake and left ventricular cavity size appear normal.        Impression : No ischemia. Possible small distal anterior infarct. LVEF 53%. Signed by: Carrie Arnold MD; Aneudy Rock MD   7. Carotid dz   - s/p CEA on left   - Plavix, Lipitor  8. HLD  Cholesterol, total 148 09/23/2020 04:27 AM   HDL Cholesterol 52 09/23/2020 04:27 AM   LDL, calculated 83.8 09/23/2020 04:27 AM   VLDL, calculated 12.2 09/23/2020 04:27 AM   Triglyceride 61 09/23/2020 04:27 AM   CHOL/HDL Ratio 2.8 09/23/2020 04:27 AM    - Lipitor PTA  9.  PAF   - Coreg.   - No OAC with ASA and Plavix use  10. DM   - A1c 5.7   - per Primary team  11. H/o CVA   - left lacunar infarct at head of caudate   - Follow up with Neuro as outpt. Leg edema improved. BP appropriate. Hydralazine reduced. Lasix IV held for Cr. Bump. She is deconditioned. I suspect she has not been taking her medicine at home. Probably needs Rehab or SNF on discharge. Follow Cr to correction. []    High complexity decision making was performed  []    Patient is at high-risk of decompensation with multiple organ involvement    Review of Symptoms:  Respiratory: No exertional dyspnea, orthopnea, PND, cough, hemoptysis, URI. Cardiovascular: No CP, palpitations, sweating, lightheadedness, dizziness, syncope, presyncope.  + lower extremity swelling. *Otherwise no other pertinent positive or negative symptoms on ROS.      Patient Active Problem List    Diagnosis Date Noted    CHF (congestive heart failure) ([facility] 09/22/2020    Weakness 05/04/2020    Generalized weakness 04/30/2020    Carotid artery stenosis, symptomatic, left 07/26/2019    HTN (hypertension) 07/17/2019    Acute ischemic stroke ([facility] 07/12/2019    Fall 12/24/2018    CKD (chronic kidney disease), stage III ([address] 07/08/2015    Edema 05/06/2015    Diabetes mellitus ([address] 05/06/2015    Postoperative anemia due to acute blood loss 02/14/2015    S/P CABG (coronary artery bypass graft) 02/13/2015    Syncope 02/09/2015    Bradycardia 02/09/2015    MORENO (acute kidney injury) ([facility] 02/09/2015    Anemia 09/09/2014    GI bleed 09/09/2014    AF (atrial fibrillation) ([address] 06/20/2014    Hypotension 06/03/2014    CAD (coronary artery disease) 06/03/2014    S/P coronary artery stent placement 06/03/2014    Renal failure 06/03/2014    Elevated troponin 06/03/2014    Pericardial effusion 06/03/2014    Lactic acidosis 06/03/2014      Yissel Blank DO  Past Medical History:   Diagnosis Date    Anxiety disorder     Atrial fibrillation ([facility]     CAD (coronary artery disease) 2007    stents, CABG x 3v    Carotid stenosis     Cervical stenosis of spinal canal 2019    Chronic kidney disease     Cough     CVA (cerebral vascular accident) ([facility] 2019    left lacunar infarct at head of caudate    Depression     AND CHRONIC ANXIETY    Diabetes ([facility]     GERD (gastroesophageal reflux disease)     High cholesterol     History of peptic ulcer     Bleeding ulcer with increased NSAID use    Hypertension     Left carotid stenosis 2019    s/p left CEA with Dr. Norma Juares, old 2007    PUD (peptic ulcer disease)     Stroke ([facility]     Tremor     Valvular heart disease       Past Surgical History:   Procedure Laterality Date    CARDIAC SURG PROCEDURE UNLIST      CABG X3 VESSEL    HX CAROTID ENDARTERECTOMY  2019    HX CORONARY ARTERY BYPASS GRAFT      HX TONSILLECTOMY  1963    TOTAL KNEE ARTHROPLASTY Right 2015     Social History     Socioeconomic History    Marital status: SINGLE     Spouse name: Not on file    Number of children: Not on file    Years of education: Not on file    Highest education level: Not on file   Occupational History    Occupation: Retired realestate/teacher   Tobacco Use    Smoking status: Former Smoker     Packs/day: 0.25     Years: 5.00     Pack years: 1.25     Types: Cigarettes     Last attempt to quit:      Years since quittin.7    Smokeless tobacco: Never Used   Substance and Sexual Activity    Alcohol use:  Yes     Alcohol/week: 0.0 standard drinks     Comment: Rare    Drug use: No   Social History Narrative    Lives in [place] alone     Family History   Problem Relation Age of Onset    Heart Attack Mother 72        Dec 91yo    Hypertension Mother     Other Father         Unknown    Parkinson's Disease Brother     Anesth Problems Neg Hx       Current Facility-Administered Medications   Medication Dose Route Frequency    HYDROcodone-acetaminophen (NORCO) 7.5-325 mg per tablet 1 Tab  1 Tab Oral Q4H PRN    [START ON 9/25/2020] enoxaparin (LOVENOX) injection 30 mg  30 mg SubCUTAneous DAILY    hydrALAZINE (APRESOLINE) tablet 25 mg  25 mg Oral TID    carvediloL (COREG) tablet 3.125 mg  3.125 mg Oral BID WITH MEALS    docusate sodium (COLACE) capsule 100 mg  100 mg Oral PRN    aspirin delayed-release tablet 81 mg  81 mg Oral DAILY    sodium chloride (NS) flush 5-40 mL  5-40 mL IntraVENous Q8H    sodium chloride (NS) flush 5-40 mL  5-40 mL IntraVENous PRN    acetaminophen (TYLENOL) tablet 650 mg  650 mg Oral Q6H PRN    Or    acetaminophen (TYLENOL) suppository 650 mg  650 mg Rectal Q6H PRN    polyethylene glycol (MIRALAX) packet 17 g  17 g Oral DAILY PRN    promethazine (PHENERGAN) tablet 12.5 mg  12.5 mg Oral Q6H PRN    Or    ondansetron (ZOFRAN) injection 4 mg  4 mg IntraVENous Q6H PRN    carbidopa-levodopa (SINEMET)  mg per tablet 2 Tab  2 Tab Oral QID    atorvastatin (LIPITOR) tablet 40 mg  40 mg Oral QHS    clopidogreL (PLAVIX) tablet 75 mg  75 mg Oral DAILY AFTER BREAKFAST    DULoxetine (CYMBALTA) capsule 120 mg  120 mg Oral DAILY    hydrALAZINE (APRESOLINE) 20 mg/mL injection 10 mg  10 mg IntraVENous Q6H PRN    [Held by provider] furosemide (LASIX) injection 40 mg  40 mg IntraVENous BID    ceFAZolin (ANCEF) 1 g in 0.9% sodium chloride (MBP/ADV) 50 mL  1 g IntraVENous Q12H    famotidine (PEPCID) tablet 20 mg  20 mg Oral DAILY    amLODIPine (NORVASC) tablet 5 mg  5 mg Oral DAILY      Prior to Admission Medications   Prescriptions Last Dose Informant Patient Reported? Taking? Cholecalciferol, Vitamin D3, 1,000 unit cap   Yes No   Sig: Take 1,000 Units by mouth daily. DULoxetine (CYMBALTA) 60 mg capsule   Yes No   Sig: Take 120 mg by mouth daily. Indications: Anxiousness associated with Depression   acetaminophen (TYLENOL) 325 mg tablet   Yes No   Sig: Take 650 mg by mouth every six (6) hours as needed for Pain or Fever.    aspirin delayed-release (Ecotrin Low Strength) 81 mg tablet   Yes No   Sig: Take 81 mg by mouth nightly. atorvastatin (LIPITOR) 40 mg tablet   Yes No   Sig: Take 40 mg by mouth nightly. carbidopa-levodopa (SINEMET)  mg per tablet   No No   Sig: Take 2 Tabs by mouth four (4) times daily. clopidogreL (Plavix) 75 mg tab   Yes No   Sig: Take 75 mg by mouth daily (after breakfast). cyanocobalamin (VITAMIN B12) 100 mcg tablet   Yes No   Sig: Take 100 mcg by mouth daily. diazePAM (VALIUM) 2 mg tablet   Yes No   Sig: Take 2 mg by mouth three (3) times daily. Indications: CHRONIC PAIN   multivitamins-minerals-lutein (CENTRUM SILVER) tab tablet   Yes No   Sig: Take 1 Tab by mouth daily. nitroglycerin (Nitrostat) 0.4 mg SL tablet   Yes No   Si.4 mg by SubLINGual route every five (5) minutes as needed for Chest Pain. Up to 3 doses. pantoprazole (PROTONIX) 40 mg tablet   Yes No   Sig: Take 40 mg by mouth Daily (before breakfast). Indications: h/o bleeding ulcer with nsaid   senna-docusate (SENNA WITH DOCUSATE SODIUM) 8.6-50 mg per tablet   Yes No   Sig: Take 1 Tab by mouth nightly. vit C,C-Do-jwjqr-lutein-zeaxan (PRESERVISION AREDS-2) 399-646-74-1 mg-unit-mg-mg cap capsule   Yes No   Sig: Take 1 Cap by mouth two (2) times a day.       Facility-Administered Medications: None      No Known Allergies    Labs:   Recent Results (from the past 24 hour(s))   METABOLIC PANEL, BASIC    Collection Time: 20  3:50 AM   Result Value Ref Range    Sodium 137 136 - 145 mmol/L    Potassium 3.6 3.5 - 5.1 mmol/L    Chloride 104 97 - 108 mmol/L    CO2 28 21 - 32 mmol/L    Anion gap 5 5 - 15 mmol/L    Glucose 103 (H) 65 - 100 mg/dL    BUN 30 (H) 6 - 20 MG/DL    Creatinine 2.16 (H) 0.55 - 1.02 MG/DL    BUN/Creatinine ratio 14 12 - 20      GFR est AA 27 (L) >60 ml/min/1.73m2    GFR est non-AA 22 (L) >60 ml/min/1.73m2    Calcium 9.1 8.5 - 10.1 MG/DL       Intake/Output Summary (Last 24 hours) at 2020 1452  Last data filed at 2020 0824  Gross per 24 hour Intake 840 ml   Output 1650 ml   Net -810 ml      Patient Vitals for the past 24 hrs:   Temp Pulse Resp BP SpO2   09/24/20 1203 98.4 °F (36.9 °C) 63 18 (!) 119/57 95 %   09/24/20 0824 98.2 °F (36.8 °C) 63 18 (!) 133/53 95 %   09/24/20 0345 98.4 °F (36.9 °C) 60 16 (!) 175/58 97 %   09/23/20 2344 98.8 °F (37.1 °C) 65 17 (!) 155/67 97 %   09/23/20 1923 98.8 °F (37.1 °C) 67 18 (!) 160/65 96 %   09/23/20 1610 98.6 °F (37 °C) 61 18 (!) 182/70 96 %        General:    Alert, cooperative, no distress. C/o feet pain   Psychiatric:   Normal Mood and slow affect    Eye/ENT:   Pupils equal, No asymmetry, Conjunctival pink. Able to hear voice at normal amplitude   Lungs:   Visibly symmetric chest expansion, No palpable tenderness. Clear to auscultation bilaterally. Heart:    Regular rate and rhythm, S1, S2 normal, no murmur, click, rub or gallop. No JVD, Normal palpable     peripheral pulses. No cyanosis. 2+ radial pulses B/L   Abdomen:    Soft, non-tender. Bowel sounds normal. No masses,  No organomegaly. Extremities:   Extremities normal, atraumatic.  trace edema B/L LE   Neurologic:   CN II-XII grossly intact. No gross focal deficits       Vamshi Castillo.  Zaki Perez MD  9/24/2020   2:59 PM        [facility] Office:   [address] [facility] Office:  [address]    [place] [address] [facility]  [address]     [address] [place] [address] Nw  P: [phone]    P: [phone]  F: [phone]    F: [phone]

## 2020-09-24 NOTE — PROGRESS NOTES
Problem: Falls - Risk of  Goal: *Absence of Falls  Description: Document Ivin Valdez Fall Risk and appropriate interventions in the flowsheet.   Outcome: Progressing Towards Goal  Note: Fall Risk Interventions:  Mobility Interventions: PT Consult for mobility concerns         Medication Interventions: Evaluate medications/consider consulting pharmacy    Elimination Interventions: Call light in reach    History of Falls Interventions: Door open when patient unattended

## 2020-09-24 NOTE — PROGRESS NOTES
Problem: Mobility Impaired (Adult and Pediatric)  Goal: *Acute Goals and Plan of Care (Insert Text)  Description: FUNCTIONAL STATUS PRIOR TO ADMISSION: Patient was modified independent using a rolling walker for functional mobility. HOME SUPPORT PRIOR TO ADMISSION: The patient lived alone with caregiver assistance 4 hours x1 day/ wk to provide assistance for shopping, household tasks. Physical Therapy Goals  Initiated 9/23/2020  1. Patient will move from supine to sit and sit to supine , scoot up and down, and roll side to side in bed with modified independence within 7 day(s). 2.  Patient will transfer from bed to chair and chair to bed with modified independence using the least restrictive device within 7 day(s). 3.  Patient will perform sit to stand with modified independence within 7 day(s). 4.  Patient will ambulate with modified independence for 100 feet with the least restrictive device within 7 day(s). Outcome: Progressing Towards Goal     PHYSICAL THERAPY TREATMENT  Patient: Syeda Moseley (33 y.o. female)  Date: 9/24/2020  Diagnosis: CHF (congestive heart failure) ([facility]) [I50.9]   <principal problem not specified>       Precautions: Fall  Chart, physical therapy assessment, plan of care and goals were reviewed. ASSESSMENT  Patient continues with skilled PT services and is progressing towards goals. Received patient supine in bed w/ PureWick and left heel protector boot donned  Functional mobility continues to be limited by pain (left foot w/ weight bearing and chronic left knee). Patient is able to transition to sitting on her own with extra time. She required cues to improve safety with sit to stand. She is able to ambulate 25 ft with a rolling walker with CGA with extremely shortened strides due to c/o left foot pain. Gait with the walker is steady, slow.   Anticipate once patient's pain is managed, that she will progress to her baseline and be appropriate to return home with HHPT. Therapy will continue to follow. Recommend with nursing patient to complete as able in order to maintain strength, endurance and independence: OOB to chair 3x/day, walking with the RW and CGA to/ from the bathroom or at minimum, using the bedside commode if pain limits her walking to the bathroom. Thank you for your assistance. Current Level of Function Impacting Discharge (mobility/balance): Pain impacts ability to walk functional distances    Other factors to consider for discharge: Lives alone w/ 4 hrs caregiver assistance 1 day/ wk; PLOF indep         PLAN :  Patient continues to benefit from skilled intervention to address the above impairments. Continue treatment per established plan of care. to address goals. Recommendation for discharge: (in order for the patient to meet his/her long term goals)  To be determined: Anticipate home with HHPT once pain is managed vs IPR    This discharge recommendation:  Has not yet been discussed the attending provider and/or case management    IF patient discharges home will need the following DME: none- owns RW       SUBJECTIVE:   Patient stated Whoever invented that boot should get an award.     OBJECTIVE DATA SUMMARY:   Critical Behavior:  Neurologic State: Alert  Orientation Level: Oriented X4  Cognition: Appropriate decision making, Appropriate for age attention/concentration, Appropriate safety awareness  Safety/Judgement: Awareness of environment  Functional Mobility Training:  Bed Mobility:     Supine to Sit: Modified independent; Bedrail     Scooting: Supervision; Additional time        Transfers:  Sit to Stand: Contact guard assistance;Assist x1;Additional time; Adaptive equipment(attempts to pull up on walker )  Stand to Sit: Contact guard assistance;Assist x1  Required several attempts rocking forward to stand (rocks were minimal). Instructed in fall risk when attempting to pull up on the walker. Balance:  Sitting: Intact; Without support  Standing: Intact; With support(rolling walker)  Standing - Static: Constant support  Standing - Dynamic : Constant support  Ambulation/Gait Training:  Distance (ft): 25 Feet (ft)  Assistive Device: Gait belt;Walker, rolling  Ambulation - Level of Assistance: Contact guard assistance;Assist x1;Additional time; Adaptive equipment        Gait Abnormalities: Decreased step clearance              Speed/Samira: Slow;Pace decreased (<100 feet/min)  Step Length: Right shortened;Left shortened(extremely shortened steps )  VC to increase strides with min correction secondary to c/o pain. Pain Rating:  Left foot pain. Did not quantify though requested pain meds after therapy. RN aware. Activity Tolerance:   Fair and limited by left foot pain and chronic left knee pain  Please refer to the flowsheet for vital signs taken during this treatment. After treatment patient left in no apparent distress:   Sitting in chair and Call bell within reach    COMMUNICATION/COLLABORATION:   The patients plan of care was discussed with: Registered nurse.      Ginger Acevedo, PT   Time Calculation: 20 mins

## 2020-09-24 NOTE — PROGRESS NOTES
Hospitalist Progress Note  Kim Holland MD  Answering service: [phone] from in house phone        Date of Service:  2020  NAME:  Kendal Lynn  :  1940  MRN:  [id number]      Admission Summary:   As per initial admission summary  Hernan Driver is a 79 y.o.  female who has a history of hypertension, CVA, carotid stenosis presents with lower extremity swelling and generalized weakness. She also reports that she was unable to walk due to swelling in the legs. She reports this was insidious in onset and gradually progressing. She denies any falls. She reports that she lives alone at home and is unable to take care of regular activities of daily living. She presents to the emergency room and noted to have swelling in the lower extremities and referred for admission. Currently patient denies chest pain shortness breath nausea vomiting or diarrhea.       We were asked to admit for work up and evaluation of the above problems        Interval history / Subjective:     Patient seen for Follow up of edema  Patient seen and examined this morning Patient c/o left heel pain and asking for a stronger pain medication. \"Tylenol is not working for CPM Braxis". Assessment & Plan:     # Lower extremity edema likely secondary to acute on chronic diastolic heart failure exacerbation  - Duplex lower extremities negative for DVT.   - s/p IV lasix, now on hold due to elevated creatinine   - strict I & o, daily weight   - Cardiology following      # Macular rash lower extremities-- improving   - Possible cellulitis of lower extremities  - Order Ancef  - Duplex negative for DVT     # Hypertension accelerated  - ct with Coreg     # Generalized weakness  - With underlying history of Parkinson's  - PT OT evaluation  - Prior work-up on the last admission negative for CVA.     # Parkinson disease  Continue Sinemet     # History of coronary disease  - Continue with aspirin Plavix     # CKD stage II   - monitor and avoid nephrotoxins     # Chronic A. Fib  - Currently normal sinus rhythm. - Not on anticoagulation per determination on previous admissions possibly secondary to risk of falls     # History of chronic carotid artery stenosis  Continue with Plavix     # Hyperlipidemia  Continue statin     # Anemia of chronic disease  Monitor hemoglobin    # Left heel pain possible plantar fasciitis   - pain control, PT    Code status: Full code   DVT prophylaxis: enoxaprin    Care Plan discussed with: Patient/Family  Disposition: TBD     Hospital Problems  Date Reviewed: 5/1/2020          Codes Class Noted POA    CHF (congestive heart failure) ([facility] ICD-10-CM: I50.9  ICD-9-CM: 428.0  9/22/2020 Unknown                Review of Systems:   A comprehensive review of systems was negative except for that written in the HPI. Vital Signs:    Last 24hrs VS reviewed since prior progress note. Most recent are:  Visit Vitals  BP (!) 119/57 (BP 1 Location: Right arm, BP Patient Position: At rest)   Pulse 63   Temp 98.4 °F (36.9 °C)   Resp 18   Ht 5' 5\" (1.651 m)   Wt 68.5 kg (151 lb 1.6 oz)   SpO2 95%   BMI 25.14 kg/m²         Intake/Output Summary (Last 24 hours) at 9/24/2020 1207  Last data filed at 9/24/2020 2808  Gross per 24 hour   Intake 840 ml   Output 1650 ml   Net -810 ml        Physical Examination:             Constitutional:  No acute distress, cooperative, pleasant    ENT:  Oral mucous moist, oropharynx benign. Neck supple,    Resp:  CTA bilaterally. No wheezing/rhonchi/rales. No accessory muscle use   CV:  Regular rhythm, normal rate, no murmurs, gallops, rubs    GI:  Soft, non distended, non tender. normoactive bowel sounds, no hepatosplenomegaly     Musculoskeletal:  positive edema, warm, 2+ pulses throughout,     Neurologic:  Moves all extremities.   AAOx3, CN II-XII reviewed            Data Review:   I personally reviewed labs and imaging Labs:     Recent Labs     09/22/20 1757   WBC 6.1   HGB 10.7*   HCT 33.7*        Recent Labs     09/24/20  0350 09/22/20 1757    138   K 3.6 3.9    107   CO2 28 25   BUN 30* 21*   CREA 2.16* 1.43*   * 97   CA 9.1 10.0     Recent Labs     09/22/20 1757   ALT 10*   *   TBILI 0.5   TP 7.2   ALB 3.7   GLOB 3.5     No results for input(s): INR, PTP, APTT, INREXT, INREXT in the last 72 hours. No results for input(s): FE, TIBC, PSAT, FERR in the last 72 hours. No results found for: FOL, RBCF   No results for input(s): PH, PCO2, PO2 in the last 72 hours.   Recent Labs     09/22/20 1757   TROIQ <0.05     Lab Results   Component Value Date/Time    Cholesterol, total 148 09/23/2020 04:27 AM    HDL Cholesterol 52 09/23/2020 04:27 AM    LDL, calculated 83.8 09/23/2020 04:27 AM    Triglyceride 61 09/23/2020 04:27 AM    CHOL/HDL Ratio 2.8 09/23/2020 04:27 AM     Lab Results   Component Value Date/Time    Glucose (POC) 118 (H) 05/11/2020 11:35 AM    Glucose (POC) 139 (H) 07/26/2019 04:34 PM    Glucose (POC) 126 (H) 07/26/2019 11:01 AM    Glucose (POC) 94 07/12/2019 05:40 PM    Glucose (POC) 135 (H) 02/17/2015 06:46 AM     Lab Results   Component Value Date/Time    Color YELLOW/STRAW 09/22/2020 06:12 PM    Appearance CLEAR 09/22/2020 06:12 PM    Specific gravity 1.009 09/22/2020 06:12 PM    pH (UA) 7.0 09/22/2020 06:12 PM    Protein 100 (A) 09/22/2020 06:12 PM    Glucose Negative 09/22/2020 06:12 PM    Ketone Negative 09/22/2020 06:12 PM    Bilirubin Negative 09/22/2020 06:12 PM    Urobilinogen 0.2 09/22/2020 06:12 PM    Nitrites Negative 09/22/2020 06:12 PM    Leukocyte Esterase TRACE (A) 09/22/2020 06:12 PM    Epithelial cells FEW 09/22/2020 06:12 PM    Bacteria Negative 09/22/2020 06:12 PM    WBC 0-4 09/22/2020 06:12 PM    RBC 0-5 09/22/2020 06:12 PM         Medications Reviewed:     Current Facility-Administered Medications   Medication Dose Route Frequency    hydrALAZINE (APRESOLINE) tablet 50 mg  50 mg Oral TID    HYDROcodone-acetaminophen (NORCO) 7.5-325 mg per tablet 1 Tab  1 Tab Oral Q4H PRN    carvediloL (COREG) tablet 3.125 mg  3.125 mg Oral BID WITH MEALS    docusate sodium (COLACE) capsule 100 mg  100 mg Oral PRN    aspirin delayed-release tablet 81 mg  81 mg Oral DAILY    sodium chloride (NS) flush 5-40 mL  5-40 mL IntraVENous Q8H    sodium chloride (NS) flush 5-40 mL  5-40 mL IntraVENous PRN    acetaminophen (TYLENOL) tablet 650 mg  650 mg Oral Q6H PRN    Or    acetaminophen (TYLENOL) suppository 650 mg  650 mg Rectal Q6H PRN    polyethylene glycol (MIRALAX) packet 17 g  17 g Oral DAILY PRN    promethazine (PHENERGAN) tablet 12.5 mg  12.5 mg Oral Q6H PRN    Or    ondansetron (ZOFRAN) injection 4 mg  4 mg IntraVENous Q6H PRN    enoxaparin (LOVENOX) injection 40 mg  40 mg SubCUTAneous DAILY    carbidopa-levodopa (SINEMET)  mg per tablet 2 Tab  2 Tab Oral QID    atorvastatin (LIPITOR) tablet 40 mg  40 mg Oral QHS    clopidogreL (PLAVIX) tablet 75 mg  75 mg Oral DAILY AFTER BREAKFAST    DULoxetine (CYMBALTA) capsule 120 mg  120 mg Oral DAILY    hydrALAZINE (APRESOLINE) 20 mg/mL injection 10 mg  10 mg IntraVENous Q6H PRN    [Held by provider] furosemide (LASIX) injection 40 mg  40 mg IntraVENous BID    ceFAZolin (ANCEF) 1 g in 0.9% sodium chloride (MBP/ADV) 50 mL  1 g IntraVENous Q12H    famotidine (PEPCID) tablet 20 mg  20 mg Oral DAILY    amLODIPine (NORVASC) tablet 5 mg  5 mg Oral DAILY     ______________________________________________________________________  EXPECTED LENGTH OF STAY: - - -  ACTUAL LENGTH OF STAY:          0                 Tonya Cedeño MD

## 2020-09-24 NOTE — PROGRESS NOTES
Bedside and Verbal shift change report given to Nora (oncoming nurse) by Sil Recio (offgoing nurse). Report included the following information SBAR, Kardex, ED Summary and MAR.

## 2020-09-24 NOTE — PROGRESS NOTES
Lovenox Monitoring  Indication: DVT Prophylaxis  Recent Labs     09/24/20  0350 09/22/20  1757   HGB  --  10.7*   PLT  --  223   CREA 2.16* 1.43*     Current Weight: 68.5 kg  Est. CrCl = ~20 ml/min  Current Dose: 40 mg subcutaneously every 24 hours. Plan: Change to 30 mg subcutaneously every 24 hours per [facility] P&T Committee Protocol with respect to renal function. Pharmacy will continue to monitor patient daily and will make dosage adjustments based upon changing renal function.

## 2020-09-25 PROBLEM — I50.9 CHF EXACERBATION (HCC): Status: ACTIVE | Noted: 2020-09-25

## 2020-09-25 PROCEDURE — 97535 SELF CARE MNGMENT TRAINING: CPT

## 2020-09-25 PROCEDURE — 74011250637 HC RX REV CODE- 250/637: Performed by: INTERNAL MEDICINE

## 2020-09-25 PROCEDURE — 74011000258 HC RX REV CODE- 258: Performed by: HOSPITALIST

## 2020-09-25 PROCEDURE — 74011250636 HC RX REV CODE- 250/636: Performed by: HOSPITALIST

## 2020-09-25 PROCEDURE — 65270000029 HC RM PRIVATE

## 2020-09-25 PROCEDURE — 74011250636 HC RX REV CODE- 250/636: Performed by: INTERNAL MEDICINE

## 2020-09-25 PROCEDURE — 74011250637 HC RX REV CODE- 250/637: Performed by: PHYSICIAN ASSISTANT

## 2020-09-25 PROCEDURE — 74011250637 HC RX REV CODE- 250/637: Performed by: HOSPITALIST

## 2020-09-25 PROCEDURE — 99232 SBSQ HOSP IP/OBS MODERATE 35: CPT | Performed by: INTERNAL MEDICINE

## 2020-09-25 PROCEDURE — 99218 HC RM OBSERVATION: CPT

## 2020-09-25 PROCEDURE — 96372 THER/PROPH/DIAG INJ SC/IM: CPT

## 2020-09-25 PROCEDURE — 96376 TX/PRO/DX INJ SAME DRUG ADON: CPT

## 2020-09-25 RX ORDER — HYDROXYZINE 25 MG/1
25 TABLET, FILM COATED ORAL
Status: DISCONTINUED | OUTPATIENT
Start: 2020-09-25 | End: 2020-10-04 | Stop reason: HOSPADM

## 2020-09-25 RX ORDER — HYDRALAZINE HYDROCHLORIDE 25 MG/1
37.5 TABLET, FILM COATED ORAL 3 TIMES DAILY
Status: DISCONTINUED | OUTPATIENT
Start: 2020-09-25 | End: 2020-10-04 | Stop reason: HOSPADM

## 2020-09-25 RX ADMIN — CARVEDILOL 3.12 MG: 3.12 TABLET, FILM COATED ORAL at 08:44

## 2020-09-25 RX ADMIN — ASPIRIN 81 MG: 81 TABLET, COATED ORAL at 08:44

## 2020-09-25 RX ADMIN — HYDROXYZINE HYDROCHLORIDE 25 MG: 25 TABLET, FILM COATED ORAL at 20:04

## 2020-09-25 RX ADMIN — HYDRALAZINE HYDROCHLORIDE 37.5 MG: 25 TABLET, FILM COATED ORAL at 17:20

## 2020-09-25 RX ADMIN — CLOPIDOGREL BISULFATE 75 MG: 75 TABLET ORAL at 08:44

## 2020-09-25 RX ADMIN — FAMOTIDINE 20 MG: 20 TABLET ORAL at 08:44

## 2020-09-25 RX ADMIN — HYDRALAZINE HYDROCHLORIDE 37.5 MG: 25 TABLET, FILM COATED ORAL at 21:39

## 2020-09-25 RX ADMIN — CARBIDOPA AND LEVODOPA 2 TABLET: 25; 100 TABLET ORAL at 12:08

## 2020-09-25 RX ADMIN — CARBIDOPA AND LEVODOPA 2 TABLET: 25; 100 TABLET ORAL at 23:06

## 2020-09-25 RX ADMIN — ATORVASTATIN CALCIUM 40 MG: 40 TABLET, FILM COATED ORAL at 08:44

## 2020-09-25 RX ADMIN — Medication 10 ML: at 06:45

## 2020-09-25 RX ADMIN — DULOXETINE 120 MG: 60 CAPSULE, DELAYED RELEASE ORAL at 09:06

## 2020-09-25 RX ADMIN — Medication 10 ML: at 21:52

## 2020-09-25 RX ADMIN — CARBIDOPA AND LEVODOPA 2 TABLET: 25; 100 TABLET ORAL at 08:44

## 2020-09-25 RX ADMIN — CEFAZOLIN SODIUM 1 G: 1 INJECTION, POWDER, FOR SOLUTION INTRAMUSCULAR; INTRAVENOUS at 17:21

## 2020-09-25 RX ADMIN — Medication 10 ML: at 17:22

## 2020-09-25 RX ADMIN — HYDRALAZINE HYDROCHLORIDE 25 MG: 25 TABLET, FILM COATED ORAL at 08:44

## 2020-09-25 RX ADMIN — HYDROCODONE BITARTRATE AND ACETAMINOPHEN 1 TABLET: 7.5; 325 TABLET ORAL at 21:39

## 2020-09-25 RX ADMIN — AMLODIPINE BESYLATE 5 MG: 5 TABLET ORAL at 08:44

## 2020-09-25 RX ADMIN — CEFAZOLIN SODIUM 1 G: 1 INJECTION, POWDER, FOR SOLUTION INTRAMUSCULAR; INTRAVENOUS at 03:03

## 2020-09-25 RX ADMIN — CARBIDOPA AND LEVODOPA 2 TABLET: 25; 100 TABLET ORAL at 17:20

## 2020-09-25 RX ADMIN — CARVEDILOL 3.12 MG: 3.12 TABLET, FILM COATED ORAL at 17:20

## 2020-09-25 RX ADMIN — ENOXAPARIN SODIUM 30 MG: 30 INJECTION SUBCUTANEOUS at 08:44

## 2020-09-25 NOTE — PROGRESS NOTES
Cardiology Consult/Progress Note           9/22/2020  6:56 PM   CHF [facility]congest[facility]) [facility] [I50.9]  CHF exacerbation ([facility] [I50.9]     HPI:   Phill Doyle is a 79 y.o. female admitted for CHF (congestive heart failure) ([facility] [I50.9]  CHF exacerbation ([facility] [I50.9]. She presented to the ER with complaints of bilateral lower extremity swelling. She notes that it is been slow in onset occurring over the last week or 2. She denies any chest pain or shortness of breath during this time. She admitted to the ER physician that she has been drinking more Coca-[employer]a in the last 2 weeks and also had a fall within the last 2 weeks. She says it is difficult to walk with her swollen legs; she also states that her feet and legs hurt as well. Past medical history for coronary artery disease with bypass, left carotid endarterectomy, hypertension, dyslipidemia, and chronic kidney disease. Investigation:  Telemetry: normal sinus rhythm     ECG: Sinus bradycardia  Left anterior fascicular block  Left ventricular hypertrophy with repolarization abnormality  When compared with ECG of 22-SEP-2020 17:45,   No significant change was found      Echocardiogram:   04/30/20   ECHO ADULT COMPLETE 05/04/2020 5/4/2020    Narrative · Image quality for this study was technically difficult. · Normal cavity size and systolic function (ejection fraction normal). Upper normal wall thickness. Estimated left ventricular ejection fraction   is 55 - 60%. No regional wall motion abnormality noted. Mild (grade 1)   left ventricular diastolic dysfunction. · Moderately dilated left atrium. · Mildly reduced systolic function. · Mildly dilated right atrium. · Mild aortic valve regurgitation is present. · Mitral valve non-specific thickening. Moderate mitral annular   calcification. Moderate mitral valve regurgitation is present. · Mild tricuspid valve regurgitation is present.   · Moderate pulmonary hypertension. Pulmonary arterial systolic pressure is   50 mmHg. Signed by: Karen Castro MD       Subjective:  Received asleep this am.  Arouses. No c/o CP or SOB. Still with left knee pain     Assessment and Plan     1. B/L LE edema   - venous insufficiency playing a role. She should use compression stockings. Alternatively, she can elevate her legs often, when sitting.    - Improved   - Lasix IV dc'd - can resume small dose of Lasix 20 mg PO, once Cr reaches baseline. Doubt that she will tolerate significant dose of Lasix radiograph. 2. HTN    - BP improved. - Hydralazine 37.5 mg TID   - continue Coreg and Norvasc  3. PHTN    - PAS on echo 50 mmHg   - Moderate  4. MR   - Moderate on echo   - moderate LA dilation on echo  5. MORENO on CKD   - Baseline 1.2 - 1.4   - Stage III - follow labs with diuresis  6. CAD   - s/p CABG x 3   - Coreg, Lipitor, Plavix, ASA  04/30/20   NUCLEAR CARDIAC STRESS TEST 05/05/2020, 05/05/2020 5/5/2020    Narrative · Baseline ECG: Normal sinus rhythm, non-specific ST-T wave abnormalities. · NM: No ischemia. Possible small distal anterior infarct. LVEF 53%. Rest and Lexiscan myocardial perfusion SPECT imaging is performed with IV   injections of 10.6 and 32.9 mCi technetium 99m Sestamibi respectively. SPECT images displayed in three planes show no ischemic reversibility. There is a small focus of distal anterior wall fixed thinning, possible   infarct. Gated SPECT images reviewed in 3 planes show unremarkable LV systolic wall   thickening. There is no segmental wall motion abnormality of the left   ventricular myocardium. The calculated LV ejection fraction is 53%. Pulmonary uptake and left ventricular cavity size appear normal.        Impression : No ischemia. Possible small distal anterior infarct. LVEF 53%. Signed by: Karen Castro MD; Jocy Bolivar MD   7. Carotid dz   - s/p CEA on left   - Plavix, Lipitor  8. HLD  Cholesterol, total 148 09/23/2020 04:27 AM   HDL Cholesterol 52 09/23/2020 04:27 AM   LDL, calculated 83.8 09/23/2020 04:27 AM   VLDL, calculated 12.2 09/23/2020 04:27 AM   Triglyceride 61 09/23/2020 04:27 AM   CHOL/HDL Ratio 2.8 09/23/2020 04:27 AM    - Lipitor PTA  9. PAF   - Coreg.   - No OAC with ASA and Plavix use  10. DM   - A1c 5.7   - per Primary team  11. H/o CVA   - left lacunar infarct at head of caudate   - Follow up with Neuro as outpt. Edema of legs improved. BP better. Suspicions for her forgetting to take her meds. She lives alone with 4 hours of caregiver assistance 1 day/wk. She is debilitated. Home with [facility] PT/OT vs. SNR/IPR. She needs no further cardiac testing. Recommend she discharge on Lasix 20 mg PO daily or as needed for symptomatic treatment of leg edema. Notably however if she does not tolerate Lasix with worsening renal function, supportive care with compression stockings and leg elevation alone may be a reasonable alternative. She can follow up with Dr. Donald Becker in 30 days, on discharge. Will see again as needed. OK for discharge, when ready, from Cardiology standpoint. []    High complexity decision making was performed  []    Patient is at high-risk of decompensation with multiple organ involvement    Review of Symptoms:  Respiratory: No exertional dyspnea, orthopnea, PND, cough, hemoptysis, URI. Cardiovascular: No CP, palpitations, sweating, lightheadedness, dizziness, syncope, presyncope.  + lower extremity swelling. *Otherwise no other pertinent positive or negative symptoms on ROS.      Patient Active Problem List    Diagnosis Date Noted    CHF exacerbation ([facility] 09/25/2020    CHF (congestive heart failure) ([facility] 09/22/2020    Weakness 05/04/2020    Generalized weakness 04/30/2020    Carotid artery stenosis, symptomatic, left 07/26/2019    HTN (hypertension) 07/17/2019    Acute ischemic stroke ([facility] 07/12/2019    Fall 12/24/2018    CKD (chronic kidney disease), stage III ([facility] 07/08/2015    Edema 05/06/2015    Diabetes mellitus ([facility] 05/06/2015    Postoperative anemia due to acute blood loss 02/14/2015    S/P CABG (coronary artery bypass graft) 02/13/2015    Syncope 02/09/2015    Bradycardia 02/09/2015    MORENO (acute kidney injury) ([facility] 02/09/2015    Anemia 09/09/2014    GI bleed 09/09/2014    AF (atrial fibrillation) ([facility] 06/20/2014    Hypotension 06/03/2014    CAD (coronary artery disease) 06/03/2014    S/P coronary artery stent placement 06/03/2014    Renal failure 06/03/2014    Elevated troponin 06/03/2014    Pericardial effusion 06/03/2014    Lactic acidosis 06/03/2014      Chari De La Paz DO  Past Medical History:   Diagnosis Date    Anxiety disorder     Atrial fibrillation ([facility]     CAD (coronary artery disease) 2007    stents, CABG x 3v    Carotid stenosis     Cervical stenosis of spinal canal 07/2019    Chronic kidney disease     Cough     CVA (cerebral vascular accident) ([facility] 07/2019    left lacunar infarct at head of caudate    Depression     AND CHRONIC ANXIETY    Diabetes (HCC)     GERD (gastroesophageal reflux disease)     High cholesterol     History of peptic ulcer     Bleeding ulcer with increased NSAID use    Hypertension     Left carotid stenosis 07/2019    s/p left CEA with Dr. Gaston Khan, old 2007    PUD (peptic ulcer disease)     Stroke ([facility]     Tremor     Valvular heart disease       Past Surgical History:   Procedure Laterality Date    CARDIAC SURG PROCEDURE UNLIST      CABG X3 VESSEL    HX CAROTID ENDARTERECTOMY  07/2019    HX CORONARY ARTERY BYPASS GRAFT      HX TONSILLECTOMY  1963    TOTAL KNEE ARTHROPLASTY Right 2015     Social History     Socioeconomic History    Marital status: SINGLE     Spouse name: Not on file    Number of children: Not on file    Years of education: Not on file    Highest education level: Not on file   Occupational History    Occupation: Retired realestate/teacher   Tobacco Use    Smoking status: Former Smoker     Packs/day: 0.25     Years: 5.00     Pack years: 1.25     Types: Cigarettes     Last attempt to quit: 1969     Years since quittin.7    Smokeless tobacco: Never Used   Substance and Sexual Activity    Alcohol use:  Yes     Alcohol/week: 0.0 standard drinks     Comment: Rare    Drug use: No   Social History Narrative    Lives in [facility]     Family History   Problem Relation Age of Onset    Heart Attack Mother 72        Dec 89yo    Hypertension Mother     Other Father         Unknown    Parkinson's Disease Brother     Anesth Problems Neg Hx       Current Facility-Administered Medications   Medication Dose Route Frequency    HYDROcodone-acetaminophen (NORCO) 7.5-325 mg per tablet 1 Tab  1 Tab Oral Q4H PRN    enoxaparin (LOVENOX) injection 30 mg  30 mg SubCUTAneous DAILY    hydrALAZINE (APRESOLINE) tablet 25 mg  25 mg Oral TID    carvediloL (COREG) tablet 3.125 mg  3.125 mg Oral BID WITH MEALS    docusate sodium (COLACE) capsule 100 mg  100 mg Oral PRN    aspirin delayed-release tablet 81 mg  81 mg Oral DAILY    sodium chloride (NS) flush 5-40 mL  5-40 mL IntraVENous Q8H    sodium chloride (NS) flush 5-40 mL  5-40 mL IntraVENous PRN    acetaminophen (TYLENOL) tablet 650 mg  650 mg Oral Q6H PRN    Or    acetaminophen (TYLENOL) suppository 650 mg  650 mg Rectal Q6H PRN    polyethylene glycol (MIRALAX) packet 17 g  17 g Oral DAILY PRN    promethazine (PHENERGAN) tablet 12.5 mg  12.5 mg Oral Q6H PRN    Or    ondansetron (ZOFRAN) injection 4 mg  4 mg IntraVENous Q6H PRN    carbidopa-levodopa (SINEMET)  mg per tablet 2 Tab  2 Tab Oral QID    atorvastatin (LIPITOR) tablet 40 mg  40 mg Oral QHS    clopidogreL (PLAVIX) tablet 75 mg  75 mg Oral DAILY AFTER BREAKFAST    DULoxetine (CYMBALTA) capsule 120 mg  120 mg Oral DAILY    hydrALAZINE (APRESOLINE) 20 mg/mL injection 10 mg  10 mg IntraVENous Q6H PRN    [Held by provider] furosemide (LASIX) injection 40 mg  40 mg IntraVENous BID    ceFAZolin (ANCEF) 1 g in 0.9% sodium chloride (MBP/ADV) 50 mL  1 g IntraVENous Q12H    famotidine (PEPCID) tablet 20 mg  20 mg Oral DAILY    amLODIPine (NORVASC) tablet 5 mg  5 mg Oral DAILY      Prior to Admission Medications   Prescriptions Last Dose Informant Patient Reported? Taking? Cholecalciferol, Vitamin D3, 1,000 unit cap   Yes No   Sig: Take 1,000 Units by mouth daily. DULoxetine (CYMBALTA) 60 mg capsule   Yes No   Sig: Take 120 mg by mouth daily. Indications: Anxiousness associated with Depression   acetaminophen (TYLENOL) 325 mg tablet   Yes No   Sig: Take 650 mg by mouth every six (6) hours as needed for Pain or Fever. aspirin delayed-release (Ecotrin Low Strength) 81 mg tablet   Yes No   Sig: Take 81 mg by mouth nightly. atorvastatin (LIPITOR) 40 mg tablet   Yes No   Sig: Take 40 mg by mouth nightly. carbidopa-levodopa (SINEMET)  mg per tablet   No No   Sig: Take 2 Tabs by mouth four (4) times daily. clopidogreL (Plavix) 75 mg tab   Yes No   Sig: Take 75 mg by mouth daily (after breakfast). cyanocobalamin (VITAMIN B12) 100 mcg tablet   Yes No   Sig: Take 100 mcg by mouth daily. diazePAM (VALIUM) 2 mg tablet   Yes No   Sig: Take 2 mg by mouth three (3) times daily. Indications: CHRONIC PAIN   multivitamins-minerals-lutein (CENTRUM SILVER) tab tablet   Yes No   Sig: Take 1 Tab by mouth daily. nitroglycerin (Nitrostat) 0.4 mg SL tablet   Yes No   Si.4 mg by SubLINGual route every five (5) minutes as needed for Chest Pain. Up to 3 doses. pantoprazole (PROTONIX) 40 mg tablet   Yes No   Sig: Take 40 mg by mouth Daily (before breakfast). Indications: h/o bleeding ulcer with nsaid   senna-docusate (SENNA WITH DOCUSATE SODIUM) 8.6-50 mg per tablet   Yes No   Sig: Take 1 Tab by mouth nightly.    vit C,V-Jb-eiwde-lutein-zeaxan (PRESERVISION AREDS-2) 620-284-05-1 mg-unit-mg-mg cap capsule   Yes No   Sig: Take 1 Cap by mouth two (2) times a day. Facility-Administered Medications: None      No Known Allergies    Labs:   Recent Results (from the past 24 hour(s))   GLUCOSE, POC    Collection Time: 09/24/20  4:20 PM   Result Value Ref Range    Glucose (POC) 132 (H) 65 - 100 mg/dL    Performed by Li Gardner, POC    Collection Time: 09/24/20  9:35 PM   Result Value Ref Range    Glucose (POC) 122 (H) 65 - 100 mg/dL    Performed by Negrita Marroquin        Intake/Output Summary (Last 24 hours) at 9/25/2020 0902  Last data filed at 9/25/2020 0313  Gross per 24 hour   Intake 720 ml   Output 2200 ml   Net -1480 ml      Patient Vitals for the past 24 hrs:   Temp Pulse Resp BP SpO2   09/25/20 0842 98.2 °F (36.8 °C) (!) 58 20 (!) 176/67    09/25/20 0311 97.9 °F (36.6 °C) (!) 58 17 (!) 159/65 96 %   09/24/20 2324 98.6 °F (37 °C) (!) 56 16 (!) 175/72 96 %   09/24/20 1955 98.2 °F (36.8 °C) (!) 57 16 (!) 118/56 96 %   09/24/20 1615 98.3 °F (36.8 °C) (!) 57 16 (!) 105/48 98 %   09/24/20 1203 98.4 °F (36.9 °C) 63 18 (!) 119/57 95 %        General:    Alert, cooperative, no distress. C/o feet pain   Psychiatric:   Normal Mood and slow affect    Eye/ENT:   Pupils equal, No asymmetry, Conjunctival pink. Able to hear voice at normal amplitude   Lungs:   Visibly symmetric chest expansion, No palpable tenderness. Clear to auscultation bilaterally. Heart:    Regular rate and rhythm, S1, S2 normal, no murmur, click, rub or gallop. No JVD, Normal palpable     peripheral pulses. No cyanosis. 2+ radial pulses B/L   Abdomen:    Soft, non-tender. Bowel sounds normal. No masses,  No organomegaly. Extremities:   Extremities normal, atraumatic.  trace edema B/L LE   Neurologic:   CN II-XII grossly intact. No gross focal deficits       Vernia Prier.  Gladis Tamez MD  9/25/2020   9:10 AM        [facility] Office:   [address] [facility] Office:  [address] [place] [address] Dr    [place] [address]     [address] [facility], [address] [place] [address], [address] [place] [address]  P: [phone]    P: [phone]  F: [phone]    F: [phone]

## 2020-09-25 NOTE — PROGRESS NOTES
Hospitalist Progress Note  Leora Vizcaino MD  Answering service: [phone] from in house phone        Date of Service:  2020  NAME:  Devon Meraz  :  1940  MRN:  [id number]      Admission Summary:   As per initial admission summary  Carlitos Dawson is a 79 y.o.  female who has a history of hypertension, CVA, carotid stenosis presents with lower extremity swelling and generalized weakness. She also reports that she was unable to walk due to swelling in the legs. She reports this was insidious in onset and gradually progressing. She denies any falls. She reports that she lives alone at home and is unable to take care of regular activities of daily living. She presents to the emergency room and noted to have swelling in the lower extremities and referred for admission. Currently patient denies chest pain shortness breath nausea vomiting or diarrhea.       We were asked to admit for work up and evaluation of the above problems        Interval history / Subjective:     Patient seen for Follow up of edema  Patient seen and examined this afternoon. \"Can I get something for my nerves\". Still having left heel pain      Assessment & Plan:     # Lower extremity edema likely secondary to acute on chronic diastolic heart failure exacerbation  - Duplex lower extremities negative for DVT. - s/p IV lasix, now on hold due to elevated creatinine.    - strict I & o, daily weight   - Cardiology following--  Recommend she discharge on Lasix 20 mg PO daily for volume     # Macular rash lower extremities-- improving   - Possible cellulitis of lower extremities  - Order Ancef  - Duplex negative for DVT     # Hypertension accelerated  - ct with [facility]g     # Generalized weakness  - With underlying history of Parkinson's  - PT OT evaluation  - Prior work-up on the last admission negative for CVA.     # Parkinson disease  Continue Sinemet     # History of coronary disease  - Continue with aspirin Plavix     # CKD stage II   - monitor and avoid nephrotoxins     # Chronic A. Fib  - Currently normal sinus rhythm. - Not on anticoagulation per determination on previous admissions possibly secondary to risk of falls     # History of chronic carotid artery stenosis  Continue with Plavix     # Hyperlipidemia  Continue statin     # Anemia of chronic disease  Monitor hemoglobin    # Left heel pain possible plantar fasciitis   - pain control, PT    Code status: Full code   DVT prophylaxis: enoxaprin    Care Plan discussed with: Patient/Family  Disposition: TBD     Hospital Problems  Date Reviewed: 5/1/2020          Codes Class Noted POA    CHF exacerbation ([facility] ICD-10-CM: I50.9  ICD-9-CM: 428.0  9/25/2020 Unknown        CHF (congestive heart failure) ([facility] ICD-10-CM: I50.9  ICD-9-CM: 428.0  9/22/2020 Unknown                Review of Systems:   A comprehensive review of systems was negative except for that written in the HPI. Vital Signs:    Last 24hrs VS reviewed since prior progress note. Most recent are:  Visit Vitals  /74 (BP 1 Location: Left arm, BP Patient Position: At rest)   Pulse (!) 57   Temp 97.9 °F (36.6 °C)   Resp 18   Ht 5' 5\" (1.651 m)   Wt 69.1 kg (152 lb 4.8 oz)   SpO2 96%   BMI 25.34 kg/m²         Intake/Output Summary (Last 24 hours) at 9/25/2020 1611  Last data filed at 9/25/2020 0313  Gross per 24 hour   Intake 480 ml   Output 1000 ml   Net -520 ml        Physical Examination:             Constitutional:  No acute distress, cooperative, pleasant    ENT:  Oral mucous moist, oropharynx benign. Neck supple,    Resp:  CTA bilaterally. No wheezing/rhonchi/rales. No accessory muscle use   CV:  Regular rhythm, normal rate, no murmurs, gallops, rubs    GI:  Soft, non distended, non tender.  normoactive bowel sounds, no hepatosplenomegaly     Musculoskeletal:  positive edema, warm, 2+ pulses throughout,     Neurologic:  Moves all extremities. AAOx3, CN II-XII reviewed            Data Review:   I personally reviewed labs and imaging       Labs:     Recent Labs     09/22/20 1757   WBC 6.1   HGB 10.7*   HCT 33.7*        Recent Labs     09/24/20  0350 09/22/20 1757    138   K 3.6 3.9    107   CO2 28 25   BUN 30* 21*   CREA 2.16* 1.43*   * 97   CA 9.1 10.0     Recent Labs     09/22/20 1757   ALT 10*   *   TBILI 0.5   TP 7.2   ALB 3.7   GLOB 3.5     No results for input(s): INR, PTP, APTT, INREXT, INREXT in the last 72 hours. No results for input(s): FE, TIBC, PSAT, FERR in the last 72 hours. No results found for: FOL, RBCF   No results for input(s): PH, PCO2, PO2 in the last 72 hours.   Recent Labs     09/22/20 1757   TROIQ <0.05     Lab Results   Component Value Date/Time    Cholesterol, total 148 09/23/2020 04:27 AM    HDL Cholesterol 52 09/23/2020 04:27 AM    LDL, calculated 83.8 09/23/2020 04:27 AM    Triglyceride 61 09/23/2020 04:27 AM    CHOL/HDL Ratio 2.8 09/23/2020 04:27 AM     Lab Results   Component Value Date/Time    Glucose (POC) 122 (H) 09/24/2020 09:35 PM    Glucose (POC) 132 (H) 09/24/2020 04:20 PM    Glucose (POC) 118 (H) 05/11/2020 11:35 AM    Glucose (POC) 139 (H) 07/26/2019 04:34 PM    Glucose (POC) 126 (H) 07/26/2019 11:01 AM     Lab Results   Component Value Date/Time    Color YELLOW/STRAW 09/22/2020 06:12 PM    Appearance CLEAR 09/22/2020 06:12 PM    Specific gravity 1.009 09/22/2020 06:12 PM    pH (UA) 7.0 09/22/2020 06:12 PM    Protein 100 (A) 09/22/2020 06:12 PM    Glucose Negative 09/22/2020 06:12 PM    Ketone Negative 09/22/2020 06:12 PM    Bilirubin Negative 09/22/2020 06:12 PM    Urobilinogen 0.2 09/22/2020 06:12 PM    Nitrites Negative 09/22/2020 06:12 PM    Leukocyte Esterase TRACE (A) 09/22/2020 06:12 PM    Epithelial cells FEW 09/22/2020 06:12 PM    Bacteria Negative 09/22/2020 06:12 PM    WBC 0-4 09/22/2020 06:12 PM    RBC 0-5 09/22/2020 06:12 PM         Medications Reviewed:     Current Facility-Administered Medications   Medication Dose Route Frequency    hydrALAZINE (APRESOLINE) tablet 37.5 mg  37.5 mg Oral TID    hydrOXYzine HCL (ATARAX) tablet 25 mg  25 mg Oral TID PRN    HYDROcodone-acetaminophen (NORCO) 7.5-325 mg per tablet 1 Tab  1 Tab Oral Q4H PRN    enoxaparin (LOVENOX) injection 30 mg  30 mg SubCUTAneous DAILY    carvediloL (COREG) tablet 3.125 mg  3.125 mg Oral BID WITH MEALS    docusate sodium (COLACE) capsule 100 mg  100 mg Oral PRN    aspirin delayed-release tablet 81 mg  81 mg Oral DAILY    sodium chloride (NS) flush 5-40 mL  5-40 mL IntraVENous Q8H    sodium chloride (NS) flush 5-40 mL  5-40 mL IntraVENous PRN    acetaminophen (TYLENOL) tablet 650 mg  650 mg Oral Q6H PRN    Or    acetaminophen (TYLENOL) suppository 650 mg  650 mg Rectal Q6H PRN    polyethylene glycol (MIRALAX) packet 17 g  17 g Oral DAILY PRN    promethazine (PHENERGAN) tablet 12.5 mg  12.5 mg Oral Q6H PRN    Or    ondansetron (ZOFRAN) injection 4 mg  4 mg IntraVENous Q6H PRN    carbidopa-levodopa (SINEMET)  mg per tablet 2 Tab  2 Tab Oral QID    atorvastatin (LIPITOR) tablet 40 mg  40 mg Oral QHS    clopidogreL (PLAVIX) tablet 75 mg  75 mg Oral DAILY AFTER BREAKFAST    DULoxetine (CYMBALTA) capsule 120 mg  120 mg Oral DAILY    hydrALAZINE (APRESOLINE) 20 mg/mL injection 10 mg  10 mg IntraVENous Q6H PRN    ceFAZolin (ANCEF) 1 g in 0.9% sodium chloride (MBP/ADV) 50 mL  1 g IntraVENous Q12H    famotidine (PEPCID) tablet 20 mg  20 mg Oral DAILY    amLODIPine (NORVASC) tablet 5 mg  5 mg Oral DAILY     ______________________________________________________________________  EXPECTED LENGTH OF STAY: 4d 2h  ACTUAL LENGTH OF STAY:          0                 Blessing Bhakta MD

## 2020-09-25 NOTE — PROGRESS NOTES
Bedside shift change report given to Alondra MIGUEL (oncoming nurse) by KOFFI Valdez (offgoing nurse). Report included the following information SBAR, Kardex, Procedure Summary, MAR and Recent Results.

## 2020-09-25 NOTE — PROGRESS NOTES
Bedside and Verbal shift change report given to Nora (oncoming nurse) by Jennifer Burrell (offgoing nurse). Report included the following information SBAR, Kardex, MAR, Accordion and Recent Results.

## 2020-09-25 NOTE — PROGRESS NOTES
Problem: Self Care Deficits Care Plan (Adult)  Goal: *Acute Goals and Plan of Care (Insert Text)  Description:   FUNCTIONAL STATUS PRIOR TO ADMISSION: Patient was modified independent using a rolling walker for functional mobility. Pt stopped driving months ago   Pt reports having an aide come to her house 1x per week for 4 hours. HOME SUPPORT: The patient lived alone with a niece who occasionally was able to provide assistance. Occupational Therapy Goals  Initiated 9/23/2020  1. Patient will perform grooming standing at sink with modified independence within 7 day(s). 2.  Patient will perform bathing with supervision/set-up within 7 day(s). 3.  Patient will perform lower body dressing using AE with modified independence within 7 day(s). 4.  Patient will perform toilet transfers with modified independence within 7 day(s). 5.  Patient will perform all aspects of toileting with modified independence within 7 day(s). 6.  Patient will utilize energy conservation techniques during functional activities with verbal cues within 7 day(s). Outcome: Not Progressing Towards Goal   OCCUPATIONAL THERAPY TREATMENT  Patient: Astrid Callejas (43 y.o. female)  Date: 9/25/2020  Diagnosis: CHF (congestive heart failure) ([facility] [I50.9]  [facility] [I50.9]   <principal problem not specified>       Precautions: Fall  Chart, occupational therapy assessment, plan of care, and goals were reviewed. ASSESSMENT  Patient continues with skilled OT services and is not progressing towards goals. Pt remains very limited by poor activity tolerance, initiation, endurance, balance, limited by sensitive bilateral LEs to light touch, decreased anterior reach to LEs, and difficulty with static and dynamic standing balance during ADLs.  Pt requires overall mod A for bed mobility with pt demonstrating difficulty managing LEs (L>R) but unable to tolerate light touch of LE to assist. Pt required mod A to scoot to EOB with heavy reliance on bed mechanics. Pt performed sit>stand with mod A and overall min A SPT to [facility] with increased time. Pt requires total A for hygiene and clothing management with toileting. Pt transferred to chair with min A requiring mod vc's for technique. Pt c/o dizziness upon sitting BP 99/58- RN notified. Pt is functioning below baseline and lives alone reporting no local support. OT spoke with CM regarding concern for managing self care at home independently. Pt would benefit from SNF rehab at d/c to increase independence with functional transfers and ADLs. If pt were to return home pt would require 24/7 supervision and assistance for safety with transfers and self care. Current Level of Function Impacting Discharge (ADLs): min-mod A with transfers, total A LB dressing and toileting, min A with UB dressing    Other factors to consider for discharge: Pt lives alone         PLAN :  Patient continues to benefit from skilled intervention to address the above impairments. Continue treatment per established plan of care. to address goals. Recommend with staff: OOB to chair with RW and min-mod A, transfer to [facility] for toileting rather than use of wik    Recommend next OT session: standing tolerance, functional transfers    Recommendation for discharge: (in order for the patient to meet his/her long term goals)  Therapy up to 5 days/week in SNF setting    This discharge recommendation:  Has been made in collaboration with the attending provider and/or case management    IF patient discharges home will need the following DME: TBD       SUBJECTIVE:   Patient stated I don't know how I am going to handle all of this.     OBJECTIVE DATA SUMMARY:   Cognitive/Behavioral Status:  Neurologic State: Alert  Orientation Level: Oriented X4  Cognition: Appropriate decision making; Appropriate for age attention/concentration; Appropriate safety awareness             Functional Mobility and Transfers for ADLs:  Bed Mobility:  Scooting: Moderate assistance    Transfers:     Functional Transfers  Toilet Transfer : Moderate assistance  Adaptive Equipment: Bedside commode;Walker (comment)       Balance:  Sitting: Intact; Without support  Standing: Impaired; With support  Standing - Static: Constant support; Fair  Standing - Dynamic : Constant support; Fair    ADL Intervention:  Feeding  Feeding Assistance: Set-up  Drink to Mouth: Set-up    Grooming  Grooming Assistance: Set-up  Washing Face: Set-up    Upper Body Bathing  Bathing Assistance: Minimum assistance  Position Performed: Long sitting on bed  Cues: Verbal cues provided(initiation)    Lower Body Bathing  Bathing Assistance: Maximum assistance  Perineal  : Maximum assistance  Position Performed: Supine(HOBE)  Lower Body : Maximum assistance    Upper Body [address] Gown: Minimum  assistance         Toileting  Toileting Assistance: Maximum assistance  Bladder Hygiene: Maximum assistance  Bowel Hygiene: Total assistance (dependent)  Clothing Management: Total assistance (dependent)  Adaptive Equipment: Walker(BSC)         Therapeutic Exercises:       Pain:  Did not verbalize number, sensitivity to light touch to BLEs    Activity Tolerance:   Fair, requires frequent rest breaks and observed SOB with activity  Please refer to the flowsheet for vital signs taken during this treatment. After treatment patient left in no apparent distress:   Sitting in chair, Heels elevated for pressure relief, Call bell within reach and RN notified    COMMUNICATION/COLLABORATION:   The patients plan of care was discussed with: Case management.      Naa Elise  Time Calculation: 48 mins

## 2020-09-25 NOTE — PROGRESS NOTES
ANDREA: SNF vs home health services    RUR - 30%    Admitted from [address] - patient does not recall name of [facility] agency previously used    Patient may need assistance with transport upon discharge. CM reviewed chart- per OT today patient needs SNF placement- will await further recommendations from PT - patient having difficulty with ambulation r/t pain/swollen legs. CM will await further direction from MD regarding disposition plans with orders for Home health SN CHF and PT/OT vs SNF at this time. Patient will need 2nd IMM letter and follow-up appt prior to discharge.  LADI Gonzalez

## 2020-09-25 NOTE — PROGRESS NOTES
Bedside shift change report given to [address] (oncoming nurse) by KOFFI Valdez (offgoing nurse). Report included the following information SBAR, Kardex, Procedure Summary, MAR and Recent Results.

## 2020-09-26 LAB
ANION GAP SERPL CALC-SCNC: 8 MMOL/L (ref 5–15)
BUN SERPL-MCNC: 40 MG/DL (ref 6–20)
BUN/CREAT SERPL: 24 (ref 12–20)
CALCIUM SERPL-MCNC: 9.1 MG/DL (ref 8.5–10.1)
CHLORIDE SERPL-SCNC: 104 MMOL/L (ref 97–108)
CO2 SERPL-SCNC: 27 MMOL/L (ref 21–32)
CREAT SERPL-MCNC: 1.66 MG/DL (ref 0.55–1.02)
GLUCOSE SERPL-MCNC: 135 MG/DL (ref 65–100)
POTASSIUM SERPL-SCNC: 3.6 MMOL/L (ref 3.5–5.1)
SODIUM SERPL-SCNC: 139 MMOL/L (ref 136–145)

## 2020-09-26 PROCEDURE — 36415 COLL VENOUS BLD VENIPUNCTURE: CPT

## 2020-09-26 PROCEDURE — 65270000029 HC RM PRIVATE

## 2020-09-26 PROCEDURE — 97116 GAIT TRAINING THERAPY: CPT

## 2020-09-26 PROCEDURE — 74011250637 HC RX REV CODE- 250/637: Performed by: PHYSICIAN ASSISTANT

## 2020-09-26 PROCEDURE — 74011250636 HC RX REV CODE- 250/636: Performed by: HOSPITALIST

## 2020-09-26 PROCEDURE — 80048 BASIC METABOLIC PNL TOTAL CA: CPT

## 2020-09-26 PROCEDURE — 77030038269 HC DRN EXT URIN PURWCK BARD -A

## 2020-09-26 PROCEDURE — 74011250636 HC RX REV CODE- 250/636: Performed by: INTERNAL MEDICINE

## 2020-09-26 PROCEDURE — 74011000258 HC RX REV CODE- 258: Performed by: HOSPITALIST

## 2020-09-26 PROCEDURE — 74011250637 HC RX REV CODE- 250/637: Performed by: HOSPITALIST

## 2020-09-26 RX ADMIN — Medication 10 ML: at 03:38

## 2020-09-26 RX ADMIN — CARBIDOPA AND LEVODOPA 2 TABLET: 25; 100 TABLET ORAL at 13:32

## 2020-09-26 RX ADMIN — HYDRALAZINE HYDROCHLORIDE 37.5 MG: 25 TABLET, FILM COATED ORAL at 16:11

## 2020-09-26 RX ADMIN — ASPIRIN 81 MG: 81 TABLET, COATED ORAL at 08:43

## 2020-09-26 RX ADMIN — CARBIDOPA AND LEVODOPA 2 TABLET: 25; 100 TABLET ORAL at 08:43

## 2020-09-26 RX ADMIN — ENOXAPARIN SODIUM 30 MG: 30 INJECTION SUBCUTANEOUS at 08:44

## 2020-09-26 RX ADMIN — AMLODIPINE BESYLATE 5 MG: 5 TABLET ORAL at 08:43

## 2020-09-26 RX ADMIN — CLOPIDOGREL BISULFATE 75 MG: 75 TABLET ORAL at 08:43

## 2020-09-26 RX ADMIN — HYDRALAZINE HYDROCHLORIDE 37.5 MG: 25 TABLET, FILM COATED ORAL at 22:07

## 2020-09-26 RX ADMIN — CARVEDILOL 3.12 MG: 3.12 TABLET, FILM COATED ORAL at 08:44

## 2020-09-26 RX ADMIN — HYDRALAZINE HYDROCHLORIDE 37.5 MG: 25 TABLET, FILM COATED ORAL at 08:43

## 2020-09-26 RX ADMIN — DULOXETINE 120 MG: 60 CAPSULE, DELAYED RELEASE ORAL at 08:43

## 2020-09-26 RX ADMIN — CARBIDOPA AND LEVODOPA 2 TABLET: 25; 100 TABLET ORAL at 22:07

## 2020-09-26 RX ADMIN — Medication 10 ML: at 22:07

## 2020-09-26 RX ADMIN — CEFAZOLIN SODIUM 1 G: 1 INJECTION, POWDER, FOR SOLUTION INTRAMUSCULAR; INTRAVENOUS at 16:11

## 2020-09-26 RX ADMIN — FAMOTIDINE 20 MG: 20 TABLET ORAL at 08:43

## 2020-09-26 RX ADMIN — CARBIDOPA AND LEVODOPA 2 TABLET: 25; 100 TABLET ORAL at 17:49

## 2020-09-26 RX ADMIN — CEFAZOLIN SODIUM 1 G: 1 INJECTION, POWDER, FOR SOLUTION INTRAMUSCULAR; INTRAVENOUS at 03:38

## 2020-09-26 RX ADMIN — ATORVASTATIN CALCIUM 40 MG: 40 TABLET, FILM COATED ORAL at 08:44

## 2020-09-26 RX ADMIN — CARVEDILOL 3.12 MG: 3.12 TABLET, FILM COATED ORAL at 16:11

## 2020-09-26 NOTE — PROGRESS NOTES
Hospitalist Progress Note  Laney Kelly MD  Answering service: [phone] from in house phone        Date of Service:  2020  NAME:  Steph Mcclure  :  1940  MRN:  [id number]      Admission Summary:   As per initial admission summary  Mynor Schmitz is a 79 y.o.  female who has a history of hypertension, CVA, carotid stenosis presents with lower extremity swelling and generalized weakness. She also reports that she was unable to walk due to swelling in the legs. She reports this was insidious in onset and gradually progressing. She denies any falls. She reports that she lives alone at home and is unable to take care of regular activities of daily living. She presents to the emergency room and noted to have swelling in the lower extremities and referred for admission. Currently patient denies chest pain shortness breath nausea vomiting or diarrhea.       We were asked to admit for work up and evaluation of the above problems        Interval history / Subjective:     Patient seen for Follow up of edema  No event reported. Awaiting placement      Assessment & Plan:     # Lower extremity edema likely secondary to acute on chronic diastolic heart failure exacerbation  - Duplex lower extremities negative for DVT. - s/p IV lasix, now on hold due to elevated creatinine.    - strict I & o, daily weight   - Cardiology following--  Recommend she discharge on Lasix 20 mg PO daily      # Macular rash lower extremities-- improving   - Possible cellulitis of lower extremities  - Ct with  Ancef  - Duplex negative for DVT     # Hypertension accelerated  - ct with [facility]     # Generalized weakness  - With underlying history of Parkinson's  - PT OT evaluation  - Prior work-up on the last admission negative for CVA.     # Parkinson disease  Continue Sinemet     # History of coronary disease  - Continue with aspirin Plavix     # CKD stage II   - monitor and avoid nephrotoxins     # Chronic A. Fib  - Currently normal sinus rhythm. - Not on anticoagulation per determination on previous admissions possibly secondary to risk of falls     # History of chronic carotid artery stenosis  Continue with Plavix     # Hyperlipidemia  Continue statin     # Anemia of chronic disease  Monitor hemoglobin    # Left heel pain possible plantar fasciitis   - pain control, PT    Code status: Full code   DVT prophylaxis: enoxaprin    Care Plan discussed with: Patient/Family  Disposition: SNF     Hospital Problems  Date Reviewed: 5/1/2020          Codes Class Noted POA    CHF exacerbation ([facility] ICD-10-CM: I50.9  ICD-9-CM: 428.0  9/25/2020 Unknown        CHF (congestive heart failure) ([facility] ICD-10-CM: I50.9  ICD-9-CM: 428.0  9/22/2020 Unknown                Review of Systems:   A comprehensive review of systems was negative except for that written in the HPI. Vital Signs:    Last 24hrs VS reviewed since prior progress note. Most recent are:  Visit Vitals  /79 (BP 1 Location: Right arm, BP Patient Position: At rest)   Pulse 61   Temp 98.7 °F (37.1 °C)   Resp 19   Ht 5' 5\" (1.651 m)   Wt 68.8 kg (151 lb 9.6 oz)   SpO2 98%   BMI 25.23 kg/m²         Intake/Output Summary (Last 24 hours) at 9/26/2020 1834  Last data filed at 9/26/2020 1703  Gross per 24 hour   Intake 400 ml   Output 300 ml   Net 100 ml        Physical Examination:             Constitutional:  No acute distress, cooperative, pleasant    ENT:  Oral mucous moist, oropharynx benign. Neck supple,    Resp:  CTA bilaterally. No wheezing/rhonchi/rales. No accessory muscle use   CV:  Regular rhythm, normal rate, no murmurs, gallops, rubs    GI:  Soft, non distended, non tender. normoactive bowel sounds, no hepatosplenomegaly     Musculoskeletal:  positive edema, warm, 2+ pulses throughout,     Neurologic:  Moves all extremities.   AAOx3, CN II-XII reviewed            Data Review:   I personally reviewed labs and imaging       Labs:     No results for input(s): WBC, HGB, HCT, PLT, HGBEXT, HCTEXT, PLTEXT, HGBEXT, HCTEXT, PLTEXT in the last 72 hours. Recent Labs     09/26/20  0341 09/24/20  0350    137   K 3.6 3.6    104   CO2 27 28   BUN 40* 30*   CREA 1.66* 2.16*   * 103*   CA 9.1 9.1     No results for input(s): ALT, AP, TBIL, TBILI, TP, ALB, GLOB, GGT, AML, LPSE in the last 72 hours. No lab exists for component: SGOT, GPT, AMYP, HLPSE  No results for input(s): INR, PTP, APTT, INREXT, INREXT in the last 72 hours. No results for input(s): FE, TIBC, PSAT, FERR in the last 72 hours. No results found for: FOL, RBCF   No results for input(s): PH, PCO2, PO2 in the last 72 hours. No results for input(s): CPK, CKNDX, TROIQ in the last 72 hours.     No lab exists for component: CPKMB  Lab Results   Component Value Date/Time    Cholesterol, total 148 09/23/2020 04:27 AM    HDL Cholesterol 52 09/23/2020 04:27 AM    LDL, calculated 83.8 09/23/2020 04:27 AM    Triglyceride 61 09/23/2020 04:27 AM    CHOL/HDL Ratio 2.8 09/23/2020 04:27 AM     Lab Results   Component Value Date/Time    Glucose (POC) 122 (H) 09/24/2020 09:35 PM    Glucose (POC) 132 (H) 09/24/2020 04:20 PM    Glucose (POC) 118 (H) 05/11/2020 11:35 AM    Glucose (POC) 139 (H) 07/26/2019 04:34 PM    Glucose (POC) 126 (H) 07/26/2019 11:01 AM     Lab Results   Component Value Date/Time    Color YELLOW/STRAW 09/22/2020 06:12 PM    Appearance CLEAR 09/22/2020 06:12 PM    Specific gravity 1.009 09/22/2020 06:12 PM    pH (UA) 7.0 09/22/2020 06:12 PM    Protein 100 (A) 09/22/2020 06:12 PM    Glucose Negative 09/22/2020 06:12 PM    Ketone Negative 09/22/2020 06:12 PM    Bilirubin Negative 09/22/2020 06:12 PM    Urobilinogen 0.2 09/22/2020 06:12 PM    Nitrites Negative 09/22/2020 06:12 PM    Leukocyte Esterase TRACE (A) 09/22/2020 06:12 PM    Epithelial cells FEW 09/22/2020 06:12 PM    Bacteria Negative 09/22/2020 06:12 PM    WBC 0-4 09/22/2020 06:12 PM    RBC 0-5 09/22/2020 06:12 PM         Medications Reviewed:     Current Facility-Administered Medications   Medication Dose Route Frequency    hydrALAZINE (APRESOLINE) tablet 37.5 mg  37.5 mg Oral TID    hydrOXYzine HCL (ATARAX) tablet 25 mg  25 mg Oral TID PRN    HYDROcodone-acetaminophen (NORCO) 7.5-325 mg per tablet 1 Tab  1 Tab Oral Q4H PRN    enoxaparin (LOVENOX) injection 30 mg  30 mg SubCUTAneous DAILY    carvediloL (COREG) tablet 3.125 mg  3.125 mg Oral BID WITH MEALS    docusate sodium (COLACE) capsule 100 mg  100 mg Oral PRN    aspirin delayed-release tablet 81 mg  81 mg Oral DAILY    sodium chloride (NS) flush 5-40 mL  5-40 mL IntraVENous Q8H    sodium chloride (NS) flush 5-40 mL  5-40 mL IntraVENous PRN    acetaminophen (TYLENOL) tablet 650 mg  650 mg Oral Q6H PRN    Or    acetaminophen (TYLENOL) suppository 650 mg  650 mg Rectal Q6H PRN    polyethylene glycol (MIRALAX) packet 17 g  17 g Oral DAILY PRN    promethazine (PHENERGAN) tablet 12.5 mg  12.5 mg Oral Q6H PRN    Or    ondansetron (ZOFRAN) injection 4 mg  4 mg IntraVENous Q6H PRN    carbidopa-levodopa (SINEMET)  mg per tablet 2 Tab  2 Tab Oral QID    atorvastatin (LIPITOR) tablet 40 mg  40 mg Oral QHS    clopidogreL (PLAVIX) tablet 75 mg  75 mg Oral DAILY AFTER BREAKFAST    DULoxetine (CYMBALTA) capsule 120 mg  120 mg Oral DAILY    hydrALAZINE (APRESOLINE) 20 mg/mL injection 10 mg  10 mg IntraVENous Q6H PRN    ceFAZolin (ANCEF) 1 g in 0.9% sodium chloride (MBP/ADV) 50 mL  1 g IntraVENous Q12H    famotidine (PEPCID) tablet 20 mg  20 mg Oral DAILY    amLODIPine (NORVASC) tablet 5 mg  5 mg Oral DAILY     ______________________________________________________________________  EXPECTED LENGTH OF STAY: 4d 2h  ACTUAL LENGTH OF STAY:          1                 Tonya Cedeño MD

## 2020-09-26 NOTE — PROGRESS NOTES
Problem: Mobility Impaired (Adult and Pediatric)  Goal: *Acute Goals and Plan of Care (Insert Text)  Description: FUNCTIONAL STATUS PRIOR TO ADMISSION: Patient was modified independent using a rolling walker for functional mobility. HOME SUPPORT PRIOR TO ADMISSION: The patient lived alone with caregiver assistance 4 hours x1 day/ wk to provide assistance for shopping, household tasks. Physical Therapy Goals  Initiated 9/23/2020  1. Patient will move from supine to sit and sit to supine , scoot up and down, and roll side to side in bed with modified independence within 7 day(s). 2.  Patient will transfer from bed to chair and chair to bed with modified independence using the least restrictive device within 7 day(s). 3.  Patient will perform sit to stand with modified independence within 7 day(s). 4.  Patient will ambulate with modified independence for 100 feet with the least restrictive device within 7 day(s). Outcome: Progressing Towards Goal  PHYSICAL THERAPY TREATMENT  Patient: Ronald Tapia (63 y.o. female)  Date: 9/26/2020  Diagnosis: CHF (congestive heart failure) ([facility] [I50.9]  CHF exacerbation ([facility] [I50.9]   <principal problem not specified>       Precautions: Fall  Chart, physical therapy assessment, plan of care and goals were reviewed. ASSESSMENT  Patient continues with skilled PT services and is progressing towards goals minimally, however unable to tolerate much of amb w/ RW d/t pain in (B) feet. She was only able to tolerate approx  % fT (x2) and requesting to return to bed. Continues to demonstrate bed mobility and sit<>stand w/ Mod I to CGA. RN aware pt back in bed. All items in reach and needs met. Current Level of Function Impacting Discharge (mobility/balance): Mod I to CGA for functional transfers and gait w/ RW.      Other factors to consider for discharge: mobility below baseline          PLAN :  Patient continues to benefit from skilled intervention to address the above impairments. Continue treatment per established plan of care. to address goals. Recommendation for discharge: (in order for the patient to meet his/her long term goals)  Physical therapy at least 2 days/week in the home AND ensure assist and/or supervision for safety with mobility vs Rehab    This discharge recommendation:  Has been made in collaboration with the attending provider and/or case management    IF patient discharges home will need the following DME: patient owns DME required for discharge       SUBJECTIVE:   Patient stated It hurts to stand .     OBJECTIVE DATA SUMMARY:   Critical Behavior:  Neurologic State: Alert  Orientation Level: Oriented X4  Cognition: Appropriate decision making, Appropriate for age attention/concentration, Appropriate safety awareness  Safety/Judgement: Awareness of environment, Fall prevention, Home safety  Functional Mobility Training:  Bed Mobility:     Supine to Sit: Modified independent  Sit to Supine: Modified independent           Transfers:  Sit to Stand: Contact guard assistance  Stand to Sit: Contact guard assistance(VC for hand placement)                             Balance:  Sitting: Intact  Standing: Impaired; With support(c/o pain in both feet w/ standing)  Standing - Static: Constant support; Fair  Standing - Dynamic : Constant support; Fair  Ambulation/Gait Training:  Distance (ft): 5 Feet (ft)(x2)  Assistive Device: Gait belt;Walker, rolling  Ambulation - Level of Assistance: Contact guard assistance;Assist x1        Gait Abnormalities: Antalgic;Decreased step clearance        Base of Support: Widened     Speed/Samira: Shuffled; Slow  Step Length: Left shortened;Right shortened      Pain Ratin/10    Activity Tolerance:   Fair and limited by (B) foot pain  Please refer to the flowsheet for vital signs taken during this treatment.     After treatment patient left in no apparent distress:   Supine in bed, Call bell within reach, and Side rails x 3    COMMUNICATION/COLLABORATION:   The patients plan of care was discussed with: Registered nurse.      Frances HENRY Means,PTA   Time Calculation: 13 mins

## 2020-09-26 NOTE — PROGRESS NOTES
TRANSFER - OUT REPORT:    Verbal report given to Mary(name) on Dagoberto Green  being transferred to (unit) for routine progression of care       Report consisted of patients Situation, Background, Assessment and   Recommendations(SBAR). Information from the following report(s) SBAR, Kardex and Procedure Summary was reviewed with the receiving nurse. Lines:   Peripheral IV 09/22/20 Left Antecubital (Active)   Site Assessment Clean, dry, & intact 09/24/20 1955   Phlebitis Assessment 0 09/24/20 1955   Infiltration Assessment 0 09/24/20 1955   Dressing Status Clean, dry, & intact 09/24/20 1955   Dressing Type Tape;Transparent 09/24/20 1955   Hub Color/Line Status Capped 09/24/20 1955   Action Taken Open ports on tubing capped 09/24/20 1955   Alcohol Cap Used Yes 09/24/20 1955        Opportunity for questions and clarification was provided.       Patient transported with:   Counsyl

## 2020-09-27 LAB
ANION GAP SERPL CALC-SCNC: 5 MMOL/L (ref 5–15)
BUN SERPL-MCNC: 37 MG/DL (ref 6–20)
BUN/CREAT SERPL: 22 (ref 12–20)
CALCIUM SERPL-MCNC: 9.3 MG/DL (ref 8.5–10.1)
CHLORIDE SERPL-SCNC: 107 MMOL/L (ref 97–108)
CO2 SERPL-SCNC: 26 MMOL/L (ref 21–32)
CREAT SERPL-MCNC: 1.65 MG/DL (ref 0.55–1.02)
GLUCOSE SERPL-MCNC: 117 MG/DL (ref 65–100)
POTASSIUM SERPL-SCNC: 4 MMOL/L (ref 3.5–5.1)
SODIUM SERPL-SCNC: 138 MMOL/L (ref 136–145)

## 2020-09-27 PROCEDURE — 36415 COLL VENOUS BLD VENIPUNCTURE: CPT

## 2020-09-27 PROCEDURE — 65270000029 HC RM PRIVATE

## 2020-09-27 PROCEDURE — 80048 BASIC METABOLIC PNL TOTAL CA: CPT

## 2020-09-27 PROCEDURE — 74011250636 HC RX REV CODE- 250/636: Performed by: INTERNAL MEDICINE

## 2020-09-27 PROCEDURE — 74011250637 HC RX REV CODE- 250/637: Performed by: PHYSICIAN ASSISTANT

## 2020-09-27 PROCEDURE — 74011000258 HC RX REV CODE- 258: Performed by: HOSPITALIST

## 2020-09-27 PROCEDURE — 74011250637 HC RX REV CODE- 250/637: Performed by: HOSPITALIST

## 2020-09-27 PROCEDURE — 74011250636 HC RX REV CODE- 250/636: Performed by: HOSPITALIST

## 2020-09-27 RX ADMIN — CEFAZOLIN SODIUM 1 G: 1 INJECTION, POWDER, FOR SOLUTION INTRAMUSCULAR; INTRAVENOUS at 03:58

## 2020-09-27 RX ADMIN — ENOXAPARIN SODIUM 30 MG: 30 INJECTION SUBCUTANEOUS at 08:15

## 2020-09-27 RX ADMIN — CARBIDOPA AND LEVODOPA 2 TABLET: 25; 100 TABLET ORAL at 08:13

## 2020-09-27 RX ADMIN — Medication 10 ML: at 13:40

## 2020-09-27 RX ADMIN — HYDRALAZINE HYDROCHLORIDE 37.5 MG: 25 TABLET, FILM COATED ORAL at 08:14

## 2020-09-27 RX ADMIN — CARBIDOPA AND LEVODOPA 2 TABLET: 25; 100 TABLET ORAL at 21:14

## 2020-09-27 RX ADMIN — Medication 10 ML: at 03:58

## 2020-09-27 RX ADMIN — DULOXETINE 120 MG: 60 CAPSULE, DELAYED RELEASE ORAL at 08:15

## 2020-09-27 RX ADMIN — FAMOTIDINE 20 MG: 20 TABLET ORAL at 08:13

## 2020-09-27 RX ADMIN — HYDRALAZINE HYDROCHLORIDE 37.5 MG: 25 TABLET, FILM COATED ORAL at 21:14

## 2020-09-27 RX ADMIN — CEFAZOLIN SODIUM 1 G: 1 INJECTION, POWDER, FOR SOLUTION INTRAMUSCULAR; INTRAVENOUS at 17:26

## 2020-09-27 RX ADMIN — Medication 10 ML: at 21:14

## 2020-09-27 RX ADMIN — HYDRALAZINE HYDROCHLORIDE 37.5 MG: 25 TABLET, FILM COATED ORAL at 17:25

## 2020-09-27 RX ADMIN — ASPIRIN 81 MG: 81 TABLET, COATED ORAL at 08:14

## 2020-09-27 RX ADMIN — CARBIDOPA AND LEVODOPA 2 TABLET: 25; 100 TABLET ORAL at 17:24

## 2020-09-27 RX ADMIN — AMLODIPINE BESYLATE 5 MG: 5 TABLET ORAL at 08:15

## 2020-09-27 RX ADMIN — CARVEDILOL 3.12 MG: 3.12 TABLET, FILM COATED ORAL at 08:15

## 2020-09-27 RX ADMIN — CLOPIDOGREL BISULFATE 75 MG: 75 TABLET ORAL at 08:13

## 2020-09-27 RX ADMIN — ATORVASTATIN CALCIUM 40 MG: 40 TABLET, FILM COATED ORAL at 08:15

## 2020-09-27 RX ADMIN — CARBIDOPA AND LEVODOPA 2 TABLET: 25; 100 TABLET ORAL at 13:40

## 2020-09-28 PROCEDURE — 74011250637 HC RX REV CODE- 250/637: Performed by: HOSPITALIST

## 2020-09-28 PROCEDURE — 74011250637 HC RX REV CODE- 250/637: Performed by: PHYSICIAN ASSISTANT

## 2020-09-28 PROCEDURE — 65270000029 HC RM PRIVATE

## 2020-09-28 PROCEDURE — 74011250636 HC RX REV CODE- 250/636: Performed by: INTERNAL MEDICINE

## 2020-09-28 PROCEDURE — 97535 SELF CARE MNGMENT TRAINING: CPT

## 2020-09-28 PROCEDURE — 74011250637 HC RX REV CODE- 250/637: Performed by: INTERNAL MEDICINE

## 2020-09-28 RX ADMIN — AMLODIPINE BESYLATE 5 MG: 5 TABLET ORAL at 09:09

## 2020-09-28 RX ADMIN — CLOPIDOGREL BISULFATE 75 MG: 75 TABLET ORAL at 09:09

## 2020-09-28 RX ADMIN — CARBIDOPA AND LEVODOPA 2 TABLET: 25; 100 TABLET ORAL at 09:09

## 2020-09-28 RX ADMIN — DULOXETINE 120 MG: 60 CAPSULE, DELAYED RELEASE ORAL at 09:09

## 2020-09-28 RX ADMIN — CARBIDOPA AND LEVODOPA 2 TABLET: 25; 100 TABLET ORAL at 14:00

## 2020-09-28 RX ADMIN — FAMOTIDINE 20 MG: 20 TABLET ORAL at 09:09

## 2020-09-28 RX ADMIN — CARBIDOPA AND LEVODOPA 2 TABLET: 25; 100 TABLET ORAL at 21:27

## 2020-09-28 RX ADMIN — ATORVASTATIN CALCIUM 40 MG: 40 TABLET, FILM COATED ORAL at 09:09

## 2020-09-28 RX ADMIN — CARBIDOPA AND LEVODOPA 2 TABLET: 25; 100 TABLET ORAL at 17:21

## 2020-09-28 RX ADMIN — CARVEDILOL 3.12 MG: 3.12 TABLET, FILM COATED ORAL at 09:09

## 2020-09-28 RX ADMIN — HYDRALAZINE HYDROCHLORIDE 37.5 MG: 25 TABLET, FILM COATED ORAL at 09:09

## 2020-09-28 RX ADMIN — CARVEDILOL 3.12 MG: 3.12 TABLET, FILM COATED ORAL at 16:29

## 2020-09-28 RX ADMIN — Medication 10 ML: at 14:00

## 2020-09-28 RX ADMIN — HYDROCODONE BITARTRATE AND ACETAMINOPHEN 1 TABLET: 7.5; 325 TABLET ORAL at 15:04

## 2020-09-28 RX ADMIN — ASPIRIN 81 MG: 81 TABLET, COATED ORAL at 09:09

## 2020-09-28 RX ADMIN — ENOXAPARIN SODIUM 30 MG: 30 INJECTION SUBCUTANEOUS at 09:11

## 2020-09-28 RX ADMIN — HYDRALAZINE HYDROCHLORIDE 37.5 MG: 25 TABLET, FILM COATED ORAL at 16:29

## 2020-09-28 NOTE — PROGRESS NOTES
Problem: Self Care Deficits Care Plan (Adult)  Goal: *Acute Goals and Plan of Care (Insert Text)  Description:   FUNCTIONAL STATUS PRIOR TO ADMISSION: Patient was modified independent using a rolling walker for functional mobility. Pt stopped driving months ago   Pt reports having an aide come to her house 1x per week for 4 hours. HOME SUPPORT: The patient lived alone with a niece who occasionally was able to provide assistance. Occupational Therapy Goals  Initiated 9/23/2020  1. Patient will perform grooming standing at sink with modified independence within 7 day(s). 2.  Patient will perform bathing with supervision/set-up within 7 day(s). 3.  Patient will perform lower body dressing using AE with modified independence within 7 day(s). 4.  Patient will perform toilet transfers with modified independence within 7 day(s). 5.  Patient will perform all aspects of toileting with modified independence within 7 day(s). 6.  Patient will utilize energy conservation techniques during functional activities with verbal cues within 7 day(s). Outcome: Progressing Towards Goal   OCCUPATIONAL THERAPY TREATMENT  Patient: Elise Sal (67 y.o. female)  Date: 9/28/2020  Diagnosis: CHF (congestive heart failure) ([facility] [I50.9]  CHF exacerbation ([facility] [I50.9]   <principal problem not specified>       Precautions: Fall  Chart, occupational therapy assessment, plan of care, and goals were reviewed. ASSESSMENT  Patient continues with skilled OT services and is progressing towards goals. Participation impacted by impaired reach to LEs, impaired standing balance, and report of pain in feet at a 9 to 10/10 (patient received pain medication approximately 45 min earlier).       Current Level of Function Impacting Discharge (ADLs): UE self care with set up to min assist, LE self care with min to total assist, functional transfers with CGA and RW    Other factors to consider for discharge: Lives alone PLAN :  Patient continues to benefit from skilled intervention to address the above impairments. Continue treatment per established plan of care. to address goals. Recommend with staff: oTny Mckeon in chair for meals and self care, UE self care with set up to min assist, LE self care with min to total assist, functional transfers with CGA and RW    Recommend next OT session: progression to bathroom, LE self care    Recommendation for discharge: (in order for the patient to meet his/her long term goals)  Therapy up to 5 days/week in SNF setting     This discharge recommendation:  Has been made in collaboration with the attending provider and/or case management    IF patient discharges home will need the following DME: bedside commode       SUBJECTIVE:   Patient stated My feet hurt when I stand on them.     OBJECTIVE DATA SUMMARY:   Cognitive/Behavioral Status:  Neurologic State: Alert  Orientation Level: Oriented X4  Cognition: Follows commands; Appropriate for age attention/concentration; Appropriate safety awareness  Perception: Appears intact  Perseveration: No perseveration noted  Safety/Judgement: Awareness of environment    Functional Mobility and Transfers for ADLs:  Bed Mobility:  Supine to Sit: Minimum assistance;Assist x1;Additional time; Adaptive equipment    Transfers:  Sit to Stand: Minimum assistance;Assist x1;Additional time; Adaptive equipment  Functional Transfers  Toilet Transfer : Contact guard assistance;Assist x1;Additional time  Cues: Verbal cues provided;Physical assistance  Adaptive Equipment: Bedside commode;Walker (comment)  Bed to Chair: Contact guard assistance;Assist x1;Additional time; Adaptive equipment    Balance:  Sitting: Intact; Without support  Standing: Impaired; With support(impacted by bilateral foot pain)  Standing - Static: Fair  Standing - Dynamic : Fair    ADL Intervention:       Grooming  Position Performed: Seated edge of bed  Brushing/Combing Hair: Set-up    Upper Body Bathing  Bathing Assistance: Set-up  Position Performed: Long sitting on bed    Lower Body Bathing  Perineal  : Total assistance (dependent)  Position Performed: Standing  Cues: Physical assistance pants up;Physical assistance pants down  Adaptive Equipment: Walker  Lower Body : Minimum assistance  Position Performed: Seated edge of bed    Upper [address]: Minimum  assistance    Lower Body Dressing Assistance  Protective Undergarmet: Total assistance (dependent)  Socks: Total assistance (dependent)    Toileting  Bladder Hygiene: Total assistance (dependent)  Bowel Hygiene: Total assistance (dependent)  Clothing Management: Total assistance (dependent)  Cues: Physical assistance for pants up;Physical assistance for pants down;Verbal cues provided; Tactile cues provided  Adaptive Equipment: Walker    Cognitive Retraining  Safety/Judgement: Awareness of environment      Activity Tolerance:   Fair  Please refer to the flowsheet for vital signs taken during this treatment. After treatment patient left in no apparent distress:   Sitting in chair, Heels elevated for pressure relief, and Call bell within reach    COMMUNICATION/COLLABORATION:   The patients plan of care was discussed with: Registered nurse.      IVETTE Hendrickson  Time Calculation: 61 mins

## 2020-09-28 NOTE — PROGRESS NOTES
Hospitalist Progress Note  Xiomara Summers MD  Answering service: [phone] from in house phone        Date of Service:  2020  NAME:  Sonu Scott  :  1940  MRN:  [id number]      Admission Summary:   As per initial admission summary  Britni Turner is a 79 y.o.  female who has a history of hypertension, CVA, carotid stenosis presents with lower extremity swelling and generalized weakness. She also reports that she was unable to walk due to swelling in the legs. She reports this was insidious in onset and gradually progressing. She denies any falls. She reports that she lives alone at home and is unable to take care of regular activities of daily living. She presents to the emergency room and noted to have swelling in the lower extremities and referred for admission. Currently patient denies chest pain shortness breath nausea vomiting or diarrhea.       We were asked to admit for work up and evaluation of the above problems        Interval history / Subjective:     Patient seen for Follow up of edema  No event reported. Patient doesn't want to go to rehab. She wants to go home. Plan for discharge tomorrow. Assessment & Plan:     # Lower extremity edema likely secondary to acute on chronic diastolic heart failure exacerbation  - Duplex lower extremities negative for DVT. - s/p IV lasix, now on hold due to elevated creatinine.    - strict I & o, daily weight   - Cardiology following--  Recommend she discharge on Lasix 20 mg PO daily      # Macular rash lower extremities-- improving   - Possible cellulitis of lower extremities  - Ct with  Ancef  - Duplex negative for DVT     # Hypertension accelerated  - ct with Coreg     # Generalized weakness  - With underlying history of Parkinson's  - PT OT evaluation  - Prior work-up on the last admission negative for CVA.     # Parkinson disease  Continue Sinemet     # History of coronary disease  - Continue with aspirin Plavix     # CKD stage II   - monitor and avoid nephrotoxins     # Chronic A. Fib  - Currently normal sinus rhythm. - Not on anticoagulation per determination on previous admissions possibly secondary to risk of falls     # History of chronic carotid artery stenosis  Continue with Plavix     # Hyperlipidemia  Continue statin     # Anemia of chronic disease  Monitor hemoglobin    # Left heel pain possible plantar fasciitis   - pain control, PT    Code status: Full code   DVT prophylaxis: enoxaprin    Care Plan discussed with: Patient/Family  Disposition: SNF     Hospital Problems  Date Reviewed: 5/1/2020          Codes Class Noted POA    CHF exacerbation ([facility] ICD-10-CM: I50.9  ICD-9-CM: 428.0  9/25/2020 Unknown        CHF (congestive heart failure) ([facility] ICD-10-CM: I50.9  ICD-9-CM: 428.0  9/22/2020 Unknown                Review of Systems:   A comprehensive review of systems was negative except for that written in the HPI. Vital Signs:    Last 24hrs VS reviewed since prior progress note. Most recent are:  Visit Vitals  BP (!) 121/57 (BP 1 Location: Right arm, BP Patient Position: At rest)   Pulse 62   Temp 98.3 °F (36.8 °C)   Resp 16   Ht 5' 5\" (1.651 m)   Wt 73.8 kg (162 lb 11.2 oz)   SpO2 97%   BMI 27.07 kg/m²         Intake/Output Summary (Last 24 hours) at 9/27/2020 2014  Last data filed at 9/27/2020 1523  Gross per 24 hour   Intake 650 ml   Output 1800 ml   Net -1150 ml        Physical Examination:             Constitutional:  No acute distress, cooperative, pleasant    ENT:  Oral mucous moist, oropharynx benign. Neck supple,    Resp:  CTA bilaterally. No wheezing/rhonchi/rales. No accessory muscle use   CV:  Regular rhythm, normal rate, no murmurs, gallops, rubs    GI:  Soft, non distended, non tender.  normoactive bowel sounds, no hepatosplenomegaly     Musculoskeletal:  positive edema, warm, 2+ pulses throughout,     Neurologic:  Moves all extremities. AAOx3, CN II-XII reviewed            Data Review:   I personally reviewed labs and imaging       Labs:     No results for input(s): WBC, HGB, HCT, PLT, HGBEXT, HCTEXT, PLTEXT, HGBEXT, HCTEXT, PLTEXT in the last 72 hours. Recent Labs     09/27/20  0409 09/26/20  0341    139   K 4.0 3.6    104   CO2 26 27   BUN 37* 40*   CREA 1.65* 1.66*   * 135*   CA 9.3 9.1     No results for input(s): ALT, AP, TBIL, TBILI, TP, ALB, GLOB, GGT, AML, LPSE in the last 72 hours. No lab exists for component: SGOT, GPT, AMYP, HLPSE  No results for input(s): INR, PTP, APTT, INREXT, INREXT in the last 72 hours. No results for input(s): FE, TIBC, PSAT, FERR in the last 72 hours. No results found for: FOL, RBCF   No results for input(s): PH, PCO2, PO2 in the last 72 hours. No results for input(s): CPK, CKNDX, TROIQ in the last 72 hours.     No lab exists for component: CPKMB  Lab Results   Component Value Date/Time    Cholesterol, total 148 09/23/2020 04:27 AM    HDL Cholesterol 52 09/23/2020 04:27 AM    LDL, calculated 83.8 09/23/2020 04:27 AM    Triglyceride 61 09/23/2020 04:27 AM    CHOL/HDL Ratio 2.8 09/23/2020 04:27 AM     Lab Results   Component Value Date/Time    Glucose (POC) 122 (H) 09/24/2020 09:35 PM    Glucose (POC) 132 (H) 09/24/2020 04:20 PM    Glucose (POC) 118 (H) 05/11/2020 11:35 AM    Glucose (POC) 139 (H) 07/26/2019 04:34 PM    Glucose (POC) 126 (H) 07/26/2019 11:01 AM     Lab Results   Component Value Date/Time    Color YELLOW/STRAW 09/22/2020 06:12 PM    Appearance CLEAR 09/22/2020 06:12 PM    Specific gravity 1.009 09/22/2020 06:12 PM    pH (UA) 7.0 09/22/2020 06:12 PM    Protein 100 (A) 09/22/2020 06:12 PM    Glucose Negative 09/22/2020 06:12 PM    Ketone Negative 09/22/2020 06:12 PM    Bilirubin Negative 09/22/2020 06:12 PM    Urobilinogen 0.2 09/22/2020 06:12 PM    Nitrites Negative 09/22/2020 06:12 PM    Leukocyte Esterase TRACE (A) 09/22/2020 06:12 PM    Epithelial cells FEW 09/22/2020 06:12 PM    Bacteria Negative 09/22/2020 06:12 PM    WBC 0-4 09/22/2020 06:12 PM    RBC 0-5 09/22/2020 06:12 PM         Medications Reviewed:     Current Facility-Administered Medications   Medication Dose Route Frequency    hydrALAZINE (APRESOLINE) tablet 37.5 mg  37.5 mg Oral TID    hydrOXYzine HCL (ATARAX) tablet 25 mg  25 mg Oral TID PRN    HYDROcodone-acetaminophen (NORCO) 7.5-325 mg per tablet 1 Tab  1 Tab Oral Q4H PRN    enoxaparin (LOVENOX) injection 30 mg  30 mg SubCUTAneous DAILY    carvediloL (COREG) tablet 3.125 mg  3.125 mg Oral BID WITH MEALS    docusate sodium (COLACE) capsule 100 mg  100 mg Oral PRN    aspirin delayed-release tablet 81 mg  81 mg Oral DAILY    sodium chloride (NS) flush 5-40 mL  5-40 mL IntraVENous Q8H    sodium chloride (NS) flush 5-40 mL  5-40 mL IntraVENous PRN    acetaminophen (TYLENOL) tablet 650 mg  650 mg Oral Q6H PRN    Or    acetaminophen (TYLENOL) suppository 650 mg  650 mg Rectal Q6H PRN    polyethylene glycol (MIRALAX) packet 17 g  17 g Oral DAILY PRN    promethazine (PHENERGAN) tablet 12.5 mg  12.5 mg Oral Q6H PRN    Or    ondansetron (ZOFRAN) injection 4 mg  4 mg IntraVENous Q6H PRN    carbidopa-levodopa (SINEMET)  mg per tablet 2 Tab  2 Tab Oral QID    atorvastatin (LIPITOR) tablet 40 mg  40 mg Oral QHS    clopidogreL (PLAVIX) tablet 75 mg  75 mg Oral DAILY AFTER BREAKFAST    DULoxetine (CYMBALTA) capsule 120 mg  120 mg Oral DAILY    hydrALAZINE (APRESOLINE) 20 mg/mL injection 10 mg  10 mg IntraVENous Q6H PRN    famotidine (PEPCID) tablet 20 mg  20 mg Oral DAILY    amLODIPine (NORVASC) tablet 5 mg  5 mg Oral DAILY     ______________________________________________________________________  EXPECTED LENGTH OF STAY: 4d 2h  ACTUAL LENGTH OF STAY:          2                 Edgard Rae MD

## 2020-09-28 NOTE — PROGRESS NOTES
BCPI-A letter regarding Heart Failure has been delivered to the patient/caregiver. Medicare pt has received, reviewed, and signed 2nd IM letter informing them of their right to appeal the discharge. Signed copied has been placed on pt bedside chart.

## 2020-09-28 NOTE — PROGRESS NOTES
Physical therapy:    Attempted PT session. Pt received supine in bed and declined therapy at this time stating she was too tired and wanted to rest more. Offered to assist pt up to the chair or even in bed activity, but pt continued to decline. Will defer and continue to follow.  Thank you    Berlinda Simmonds, PT, DPT

## 2020-09-28 NOTE — PROGRESS NOTES
Physician Progress Note      PATIENT:               Raquel Clemons  [facility] #:                  [id number]  :                       1940  ADMIT DATE:       2020 6:56 PM  DISCH DATE:  RESPONDING  PROVIDER #:        Maria E TOPETE PA-C          QUERY TEXT:    Patient admitted with diastolic CHF and has CKD 3. Noted documentation of MORENO by cardiology in PN dated . Assuming MORENO stands for acute kidney injury, the patient does not meet KDIGO criteria for the MORENO portion of this diagnosis. If you are using another criteria to support this diagnosis, please document this in your progress note. Otherwise, please document in the progress notes the clinical indicators that support this diagnosis or can the condition be further clarified as: The medical record reflects the following:  Risk Factors: CKD 3; CHF    Clinical Indicators:    Cr 1.43-> 2.16-> 1.66-> 1.65 (baseline Cr 1.2 - 1.4)  GFR 35-> 22-> 30-> 30  BUN 21-> 30-> 40-> 37    Cards PN - MORENO on CKD  - Baseline 1.2 - 1.4  - Stage III - follow labs with diuresis    Treatment: monitor BMP; avoid nephrotoxins    Reference: mobli. com    In patients with chronic kidney disease (CKD), MORENO is defined according to CKD stage:    STAGE 1: (CrCl >90) 0.3 mg/dl increase in Creatinine over 24hr or  0.5 mg/dl increase over 48hr  STAGE 2: (CrCl 60-89) 0.5 mg/dl increase in Creatinine over 24hr or 1.0 mg/dl increase over 48hr  STAGE 3: (CrCl 30-59) 1.0 mg/dl increase in Creatinine over 24hr or 1.5 mg/dl increase over 48hr  STAGE 4: (CrCl 15-29) Based on research limits of original study, use stage 3 parameters    Thank you,    Joie Naylor, RN, BSN, CCM  Clinical   [facility]  Options provided:  -- Acute kidney injury on CKD 3 evidenced by, Please document evidence.   -- Acute kidney injury ruled out after study  -- Acute kidney insufficiency on CKD 3  -- Other - I will add my own diagnosis  -- Disagree - Not applicable / Not valid  -- Disagree - Clinically unable to determine / Unknown  -- Refer to Clinical Documentation Reviewer    PROVIDER RESPONSE TEXT:    This patient has acute kidney insufficiency on CKD 3.     Query created by: Abe Reddy on 9/28/2020 10:41 AM      Electronically signed by:  Amrik Gutierrez PA-C 9/28/2020 1:13 PM

## 2020-09-28 NOTE — ROUTINE PROCESS
Medicare pt has received, reviewed, and signed 2nd IM letter informing them of their right to appeal the discharge. Signed copied has been placed on pt bedside chart.

## 2020-09-28 NOTE — PROGRESS NOTES
Bedside shift change report given to [address] (oncoming nurse) by Deep Shields (offgoing nurse). Report included the following information SBAR, Kardex, Intake/Output, MAR and Recent Results.

## 2020-09-28 NOTE — PROGRESS NOTES
ANDREA;  CRM met with patient this afternoon. CRM made rounds with staff this am and patient was slightly abrasive in her verbal responses regarding picking a skilled nursing home in her [address] FINK presented patient with a list of facilities and I read them to her. Patient will need to be approved by [facility] and I will follow up in am.  She is now interested in the [id number] [facility].   I will send referral to this snf and follow up in am.

## 2020-09-28 NOTE — PROGRESS NOTES
Hospitalist Progress Note  Rowena Simmonds, MD  Answering service: [phone] from in house phone        Date of Service:  2020  NAME:  Homero Winchester  :  1940  MRN:  [id number]      Admission Summary:   As per initial admission summary  Saray Beebe is a 79 y.o.  female who has a history of hypertension, CVA, carotid stenosis presents with lower extremity swelling and generalized weakness. She also reports that she was unable to walk due to swelling in the legs. She reports this was insidious in onset and gradually progressing. She denies any falls. She reports that she lives alone at home and is unable to take care of regular activities of daily living. She presents to the emergency room and noted to have swelling in the lower extremities and referred for admission. Currently patient denies chest pain shortness breath nausea vomiting or diarrhea.       We were asked to admit for work up and evaluation of the above problems        Interval history / Subjective:     Patient seen for Follow up of edema  No event reported. I have discussed discharge with patient. She said she can't walk therefore can't go home. This has been discussed with her over the weekend. Based on PT/OT recommendation and my assessment patient is a fall risk. But she has been refusing to go to rehab or SNF. She was demanding to be discharged home. But when I talked about sending her home, she stated she can't go and prefer to look into SNF options. \"My feet are numb\". Assessment & Plan:     # Lower extremity edema likely secondary to acute on chronic diastolic heart failure exacerbation  - Duplex lower extremities negative for DVT. - s/p IV lasix, now off due to elevated creatinine.    - strict I & o, daily weight   - Cardiology following--  Recommend she be discharge on Lasix 20 mg PO daily      # Macular rash lower extremities-- improving   - Possible cellulitis of lower extremities  - Completed IVF Ancef  - Duplex negative for DVT     # Hypertension accelerated  - ct with Coreg     # Generalized weakness  - With underlying history of Parkinson's  - PT/OT evaluation  - Prior work-up on the last admission negative for CVA.     # Parkinson disease  - Continue Sinemet     # History of coronary disease  - Continue with aspirin Plavix     # CKD stage II   - monitor and avoid nephrotoxins     # Chronic A. Fib  - Currently normal sinus rhythm. - Not on anticoagulation per determination on previous admissions possibly secondary to risk of falls     # History of chronic carotid artery stenosis  Continue with Plavix     # Hyperlipidemia  Continue statin     # Anemia of chronic disease  Monitor hemoglobin    # Left heel pain possible plantar fasciitis   - pain control, PT    Code status: Full code   DVT prophylaxis: enoxaprin    Care Plan discussed with: Patient/Family  Disposition: SNF     Hospital Problems  Date Reviewed: 5/1/2020          Codes Class Noted POA    CHF exacerbation ([facility] ICD-10-CM: I50.9  ICD-9-CM: 428.0  9/25/2020 Unknown        CHF (congestive heart failure) ([facility] ICD-10-CM: I50.9  ICD-9-CM: 428.0  9/22/2020 Unknown                Review of Systems:   A comprehensive review of systems was negative except for that written in the HPI. Vital Signs:    Last 24hrs VS reviewed since prior progress note. Most recent are:  Visit Vitals  /65 (BP 1 Location: Right arm, BP Patient Position: At rest)   Pulse 60   Temp 98 °F (36.7 °C)   Resp 18   Ht 5' 5\" (1.651 m)   Wt 73.9 kg (162 lb 14.7 oz)   SpO2 97%   BMI 27.11 kg/m²         Intake/Output Summary (Last 24 hours) at 9/28/2020 1457  Last data filed at 9/28/2020 1208  Gross per 24 hour   Intake 650 ml   Output 2600 ml   Net -1950 ml        Physical Examination:             Constitutional:  No acute distress,    ENT:  Oral mucous moist, oropharynx benign.  Neck supple,    Resp: CTA bilaterally. No wheezing/rhonchi/rales. No accessory muscle use   CV:  Regular rhythm, normal rate, no murmurs, gallops, rubs    GI:  Soft, non distended, non tender. normoactive bowel sounds, no hepatosplenomegaly     Musculoskeletal:  positive edema, warm, 2+ pulses throughout,     Neurologic:  Moves all extremities. AAOx3, CN II-XII reviewed  Psy: she seems agitated             Data Review:   I personally reviewed labs and imaging       Labs:     No results for input(s): WBC, HGB, HCT, PLT, HGBEXT, HCTEXT, PLTEXT, HGBEXT, HCTEXT, PLTEXT in the last 72 hours. Recent Labs     09/27/20  0409 09/26/20  0341    139   K 4.0 3.6    104   CO2 26 27   BUN 37* 40*   CREA 1.65* 1.66*   * 135*   CA 9.3 9.1     No results for input(s): ALT, AP, TBIL, TBILI, TP, ALB, GLOB, GGT, AML, LPSE in the last 72 hours. No lab exists for component: SGOT, GPT, AMYP, HLPSE  No results for input(s): INR, PTP, APTT, INREXT, INREXT in the last 72 hours. No results for input(s): FE, TIBC, PSAT, FERR in the last 72 hours. No results found for: FOL, RBCF   No results for input(s): PH, PCO2, PO2 in the last 72 hours. No results for input(s): CPK, CKNDX, TROIQ in the last 72 hours.     No lab exists for component: CPKMB  Lab Results   Component Value Date/Time    Cholesterol, total 148 09/23/2020 04:27 AM    HDL Cholesterol 52 09/23/2020 04:27 AM    LDL, calculated 83.8 09/23/2020 04:27 AM    Triglyceride 61 09/23/2020 04:27 AM    CHOL/HDL Ratio 2.8 09/23/2020 04:27 AM     Lab Results   Component Value Date/Time    Glucose (POC) 122 (H) 09/24/2020 09:35 PM    Glucose (POC) 132 (H) 09/24/2020 04:20 PM    Glucose (POC) 118 (H) 05/11/2020 11:35 AM    Glucose (POC) 139 (H) 07/26/2019 04:34 PM    Glucose (POC) 126 (H) 07/26/2019 11:01 AM     Lab Results   Component Value Date/Time    Color YELLOW/STRAW 09/22/2020 06:12 PM    Appearance CLEAR 09/22/2020 06:12 PM    Specific gravity 1.009 09/22/2020 06:12 PM    pH (UA) 7.0 09/22/2020 06:12 PM    Protein 100 (A) 09/22/2020 06:12 PM    Glucose Negative 09/22/2020 06:12 PM    Ketone Negative 09/22/2020 06:12 PM    Bilirubin Negative 09/22/2020 06:12 PM    Urobilinogen 0.2 09/22/2020 06:12 PM    Nitrites Negative 09/22/2020 06:12 PM    Leukocyte Esterase TRACE (A) 09/22/2020 06:12 PM    Epithelial cells FEW 09/22/2020 06:12 PM    Bacteria Negative 09/22/2020 06:12 PM    WBC 0-4 09/22/2020 06:12 PM    RBC 0-5 09/22/2020 06:12 PM         Medications Reviewed:     Current Facility-Administered Medications   Medication Dose Route Frequency    hydrALAZINE (APRESOLINE) tablet 37.5 mg  37.5 mg Oral TID    hydrOXYzine HCL (ATARAX) tablet 25 mg  25 mg Oral TID PRN    HYDROcodone-acetaminophen (NORCO) 7.5-325 mg per tablet 1 Tab  1 Tab Oral Q4H PRN    enoxaparin (LOVENOX) injection 30 mg  30 mg SubCUTAneous DAILY    carvediloL (COREG) tablet 3.125 mg  3.125 mg Oral BID WITH MEALS    docusate sodium (COLACE) capsule 100 mg  100 mg Oral PRN    aspirin delayed-release tablet 81 mg  81 mg Oral DAILY    sodium chloride (NS) flush 5-40 mL  5-40 mL IntraVENous Q8H    sodium chloride (NS) flush 5-40 mL  5-40 mL IntraVENous PRN    acetaminophen (TYLENOL) tablet 650 mg  650 mg Oral Q6H PRN    Or    acetaminophen (TYLENOL) suppository 650 mg  650 mg Rectal Q6H PRN    polyethylene glycol (MIRALAX) packet 17 g  17 g Oral DAILY PRN    promethazine (PHENERGAN) tablet 12.5 mg  12.5 mg Oral Q6H PRN    Or    ondansetron (ZOFRAN) injection 4 mg  4 mg IntraVENous Q6H PRN    carbidopa-levodopa (SINEMET)  mg per tablet 2 Tab  2 Tab Oral QID    atorvastatin (LIPITOR) tablet 40 mg  40 mg Oral QHS    clopidogreL (PLAVIX) tablet 75 mg  75 mg Oral DAILY AFTER BREAKFAST    DULoxetine (CYMBALTA) capsule 120 mg  120 mg Oral DAILY    hydrALAZINE (APRESOLINE) 20 mg/mL injection 10 mg  10 mg IntraVENous Q6H PRN    famotidine (PEPCID) tablet 20 mg  20 mg Oral DAILY    amLODIPine (NORVASC) tablet 5 mg  5 mg Oral DAILY     ______________________________________________________________________  EXPECTED LENGTH OF STAY: 4d 2h  ACTUAL LENGTH OF STAY:          3                 Ilia Luong MD

## 2020-09-28 NOTE — PROGRESS NOTES
Bedside shift change report given to Cuco (oncoming nurse) by Holly Shields (offgoing nurse). Report included the following information SBAR.

## 2020-09-29 LAB
ANION GAP SERPL CALC-SCNC: 2 MMOL/L (ref 5–15)
BUN SERPL-MCNC: 36 MG/DL (ref 6–20)
BUN/CREAT SERPL: 25 (ref 12–20)
CALCIUM SERPL-MCNC: 9.8 MG/DL (ref 8.5–10.1)
CHLORIDE SERPL-SCNC: 108 MMOL/L (ref 97–108)
CO2 SERPL-SCNC: 27 MMOL/L (ref 21–32)
CREAT SERPL-MCNC: 1.43 MG/DL (ref 0.55–1.02)
GLUCOSE SERPL-MCNC: 121 MG/DL (ref 65–100)
POTASSIUM SERPL-SCNC: 4.2 MMOL/L (ref 3.5–5.1)
SODIUM SERPL-SCNC: 137 MMOL/L (ref 136–145)
VIT B12 SERPL-MCNC: 1390 PG/ML (ref 193–986)

## 2020-09-29 PROCEDURE — 87635 SARS-COV-2 COVID-19 AMP PRB: CPT

## 2020-09-29 PROCEDURE — 82607 VITAMIN B-12: CPT

## 2020-09-29 PROCEDURE — 97535 SELF CARE MNGMENT TRAINING: CPT

## 2020-09-29 PROCEDURE — 97116 GAIT TRAINING THERAPY: CPT

## 2020-09-29 PROCEDURE — 74011250637 HC RX REV CODE- 250/637: Performed by: HOSPITALIST

## 2020-09-29 PROCEDURE — 80048 BASIC METABOLIC PNL TOTAL CA: CPT

## 2020-09-29 PROCEDURE — 36415 COLL VENOUS BLD VENIPUNCTURE: CPT

## 2020-09-29 PROCEDURE — 74011250637 HC RX REV CODE- 250/637: Performed by: INTERNAL MEDICINE

## 2020-09-29 PROCEDURE — 65270000029 HC RM PRIVATE

## 2020-09-29 PROCEDURE — 74011250637 HC RX REV CODE- 250/637: Performed by: PHYSICIAN ASSISTANT

## 2020-09-29 PROCEDURE — 74011250636 HC RX REV CODE- 250/636: Performed by: INTERNAL MEDICINE

## 2020-09-29 RX ADMIN — Medication 10 ML: at 22:00

## 2020-09-29 RX ADMIN — CARBIDOPA AND LEVODOPA 2 TABLET: 25; 100 TABLET ORAL at 21:58

## 2020-09-29 RX ADMIN — CARBIDOPA AND LEVODOPA 2 TABLET: 25; 100 TABLET ORAL at 17:38

## 2020-09-29 RX ADMIN — AMLODIPINE BESYLATE 5 MG: 5 TABLET ORAL at 09:17

## 2020-09-29 RX ADMIN — ATORVASTATIN CALCIUM 40 MG: 40 TABLET, FILM COATED ORAL at 09:15

## 2020-09-29 RX ADMIN — Medication 10 ML: at 14:52

## 2020-09-29 RX ADMIN — CLOPIDOGREL BISULFATE 75 MG: 75 TABLET ORAL at 09:16

## 2020-09-29 RX ADMIN — CARVEDILOL 3.12 MG: 3.12 TABLET, FILM COATED ORAL at 06:35

## 2020-09-29 RX ADMIN — HYDRALAZINE HYDROCHLORIDE 37.5 MG: 25 TABLET, FILM COATED ORAL at 21:58

## 2020-09-29 RX ADMIN — HYDRALAZINE HYDROCHLORIDE 37.5 MG: 25 TABLET, FILM COATED ORAL at 17:38

## 2020-09-29 RX ADMIN — ASPIRIN 81 MG: 81 TABLET, COATED ORAL at 09:17

## 2020-09-29 RX ADMIN — HYDROCODONE BITARTRATE AND ACETAMINOPHEN 1 TABLET: 7.5; 325 TABLET ORAL at 09:14

## 2020-09-29 RX ADMIN — ENOXAPARIN SODIUM 30 MG: 30 INJECTION SUBCUTANEOUS at 09:17

## 2020-09-29 RX ADMIN — HYDROCODONE BITARTRATE AND ACETAMINOPHEN 1 TABLET: 7.5; 325 TABLET ORAL at 15:03

## 2020-09-29 RX ADMIN — FAMOTIDINE 20 MG: 20 TABLET ORAL at 09:16

## 2020-09-29 RX ADMIN — DULOXETINE 120 MG: 60 CAPSULE, DELAYED RELEASE ORAL at 09:16

## 2020-09-29 RX ADMIN — CARVEDILOL 3.12 MG: 3.12 TABLET, FILM COATED ORAL at 17:39

## 2020-09-29 RX ADMIN — HYDRALAZINE HYDROCHLORIDE 37.5 MG: 25 TABLET, FILM COATED ORAL at 09:16

## 2020-09-29 RX ADMIN — CARBIDOPA AND LEVODOPA 2 TABLET: 25; 100 TABLET ORAL at 14:59

## 2020-09-29 RX ADMIN — CARBIDOPA AND LEVODOPA 2 TABLET: 25; 100 TABLET ORAL at 09:16

## 2020-09-29 NOTE — PROGRESS NOTES
ANDREA;  CRM has been in contact with Alfonzo Camarena and Geovani Tse at the [facility]. She is in the process of obtaining an authorization for patient to come for a short stay in rehab. Patient is in agreement and is making progress with therapy this pm.  If I receive an auth this pm I will set up stretcher for patient.

## 2020-09-29 NOTE — PROGRESS NOTES
Problem: Self Care Deficits Care Plan (Adult)  Goal: *Acute Goals and Plan of Care (Insert Text)  Description:   FUNCTIONAL STATUS PRIOR TO ADMISSION: Patient was modified independent using a rolling walker for functional mobility. Pt stopped driving months ago   Pt reports having an aide come to her house 1x per week for 4 hours. HOME SUPPORT: The patient lived alone with a niece who occasionally was able to provide assistance. Occupational Therapy Goals  Initiated 9/23/2020  1. Patient will perform grooming standing at sink with modified independence within 7 day(s). 2.  Patient will perform bathing with supervision/set-up within 7 day(s). 3.  Patient will perform lower body dressing using AE with modified independence within 7 day(s). 4.  Patient will perform toilet transfers with modified independence within 7 day(s). 5.  Patient will perform all aspects of toileting with modified independence within 7 day(s). 6.  Patient will utilize energy conservation techniques during functional activities with verbal cues within 7 day(s). 9/29/2020 1057 by IVETTE Sheehan  Outcome: Progressing Towards Goal   OCCUPATIONAL THERAPY TREATMENT  Patient: Goran Sellers (80 y.o. female)  Date: 9/29/2020  Diagnosis: CHF (congestive heart failure) ([facility] [I50.9]  CHF exacerbation ([facility] [I50.9]   <principal problem not specified>       Precautions: Fall  Chart, occupational therapy assessment, plan of care, and goals were reviewed. ASSESSMENT  Patient continues with skilled OT services and is progressing towards goals. Patient medicated for foot pain prior to session. Participation impacted by bladder incontinence, impaired reach to feet, impaired dynamic standing, report of bilateral foot pain/numbness with standing/walking. Patient agreeable to OOB with encouragement. Declined walking to bathroom, but agreeable to getting OOB to toilet and transfer to chair.  Demonstrated improved mobility to EOB and ability to manage perineal hygiene this date. Patient with flat affect overall and short responses, but smiles to joking. Reports pain in feet a 0/10 when feet not on floor. O2 SATs on room air 98% and HR 59. Current Level of Function Impacting Discharge (ADLs):  UE self care with set up to min assist, LE self care with min to total assist, functional transfers with CGA and RW    Other factors to consider for discharge: Lives alone         PLAN :  Patient continues to benefit from skilled intervention to address the above impairments. Continue treatment per established plan of care. to address goals. Recommend with staff: Radha Shane in chair for meals and self care, UE self care with set up to min assist, LE self care with min to total assist, functional transfers with CGA and RW to [facility], progression to bathroom for toileting    Recommend next OT session: LE self care, progression to bathroom toilet    Recommendation for discharge: (in order for the patient to meet his/her long term goals)  Therapy up to 5 days/week in SNF setting    This discharge recommendation:  Has been made in collaboration with the attending provider and/or case management    IF patient discharges home will need the following DME: bedside commode       SUBJECTIVE:   Patient stated Sometimes.  when asked if she is incontinent at home    OBJECTIVE DATA SUMMARY:   Cognitive/Behavioral Status:  Neurologic State: Alert  Orientation Level: Oriented X4  Cognition: Appropriate for age attention/concentration; Follows commands; Appropriate safety awareness  Perception: Appears intact  Perseveration: No perseveration noted  Safety/Judgement: Awareness of environment    Functional Mobility and Transfers for ADLs:  Bed Mobility:  Supine to Sit: Supervision;Assist x1;Additional time;Bed Modified; Adaptive equipment    Transfers:  Sit to Stand: Contact guard assistance;Assist x1;Additional time; Adaptive equipment  Functional Transfers  Toilet Transfer : Contact guard assistance;Assist x1;Additional time; Adaptive equipment  Cues: Verbal cues provided;Physical assistance  Adaptive Equipment: Bedside commode;Walker (comment)  Bed to Chair: Contact guard assistance;Assist x1;Additional time; Adaptive equipment    Balance:  Sitting: Intact; Without support  Standing: Impaired; With support  Standing - Static: Good  Standing - Dynamic : Fair    ADL Intervention:  Feeding  Feeding Assistance: Independent    Grooming  Grooming Assistance: Set-up  Position Performed: Seated in chair  Brushing Teeth: Set-up  Brushing/Combing Hair: Set-up         Lower Body Bathing  Perineal  : Moderate assistance  Position Performed: Standing  Cues: Physical assistance pants up;Physical assistance pants down;Verbal cues provided  Adaptive Equipment: Walker              Toileting  Bladder Hygiene: Minimum assistance  Bowel Hygiene: Moderate assistance  Clothing Management: Total assistance (dependent)  Cues: Physical assistance for pants down;Physical assistance for pants up;Verbal cues provided  Adaptive Equipment: Walker    Cognitive Retraining  Attention to Task: Single task  Following Commands: Follows one step commands/directions  Safety/Judgement: Awareness of environment  Cues: Verbal cues provided; Tactile cues provided      Activity Tolerance:   Fair  Please refer to the flowsheet for vital signs taken during this treatment. After treatment patient left in no apparent distress:   Sitting in chair, Heels elevated for pressure relief, and Call bell within reach    COMMUNICATION/COLLABORATION:   The patients plan of care was discussed with: Registered nurse.      IVETTE Farooq  Time Calculation: 26 mins

## 2020-09-29 NOTE — PROGRESS NOTES
NUTRITION  Reason for Rescreen: LOS          Recommendations/Plan   - continue current diet       Information obtained from:  Pt and chart  Past Medical History:   Diagnosis Date    Anxiety disorder     Atrial fibrillation ([facility]     CAD (coronary artery disease) 2007    stents, CABG x 3v    Carotid stenosis     Cervical stenosis of spinal canal 07/2019    Chronic kidney disease     Cough     CV[facility]cerebral vascular accident) ([facility] 07/2019    left lacunar infarct at head of caudate    Depression     AND CHRONIC ANXIETY    Diabetes ([facility]     GERD (gastroesophageal reflux disease)     High cholesterol     History of peptic ulcer     Bleeding ulcer with increased NSAID use    Hypertension     Left carotid stenosis 07/2019    s/p left CEA with Dr. Bimal Beltran, old 2007    PUD (peptic ulcer disease)     Stroke ([facility]     Tremor     Valvular heart disease        Pt admitted with CHF (congestive heart failure) ([facility] [I50.9]  CHF exacerbation ([facility] [I50.9]   Met with pt at bedside. No complaints except wishes kitchen would not send rice or peppers. Denies wt loss/ poor PO intake. Aware of Na+ restriction. Current labs/meds reviewed. Trace bilat LE edema. Last BM 9/29. No overt nutritional concerns identified. Will continue to re-screen per policy.       Diet:  cardiac  Supplements: None  Meal intake:   Patient Vitals for the past 168 hrs:   % Diet Eaten   09/25/20 1721 100 %   09/24/20 1827 100 %   09/24/20 1203 90 %   09/24/20 0824 100 %   09/23/20 0730 80 %       Weight Changes:   Stable  Wt Readings from Last 10 Encounters:   09/29/20 73.3 kg (161 lb 9.6 oz)   05/06/20 79.6 kg (175 lb 7.8 oz)   05/01/20 73.5 kg (162 lb)   03/18/20 72.6 kg (160 lb)   12/12/19 76.2 kg (168 lb)   08/13/19 71 kg (156 lb 9.6 oz)   07/31/19 79 kg (174 lb 3.2 oz)   07/18/19 74 kg (163 lb 2.3 oz)   02/12/19 73.5 kg (162 lb)   11/15/18 76 kg (167 lb 9.6 oz)       Nutrition-Related Concerns Identified:  None  Specified food preferences    Estimated Daily Nutrient Needs:  Energy (kcal):  1600(MSJ x 1.3)  Protein (g):  60(0.8 gm/kg)       Fluid (ml/day):  1 mL/kcal      PLAN:   - Continue current diet and - Rescreen per protocol    Will revisit per policy    Tanya Lubin RD

## 2020-09-29 NOTE — PROGRESS NOTES
Problem: Mobility Impaired (Adult and Pediatric)  Goal: *Acute Goals and Plan of Care (Insert Text)  Description: FUNCTIONAL STATUS PRIOR TO ADMISSION: Patient was modified independent using a rolling walker for functional mobility. HOME SUPPORT PRIOR TO ADMISSION: The patient lived alone with caregiver assistance 4 hours x1 day/ wk to provide assistance for shopping, household tasks. Physical Therapy Goals  Initiated 9/23/2020  1. Patient will move from supine to sit and sit to supine , scoot up and down, and roll side to side in bed with modified independence within 7 day(s). 2.  Patient will transfer from bed to chair and chair to bed with modified independence using the least restrictive device within 7 day(s). 3.  Patient will perform sit to stand with modified independence within 7 day(s). 4.  Patient will ambulate with modified independence for 100 feet with the least restrictive device within 7 day(s). Outcome: Progressing Towards Goal   PHYSICAL THERAPY TREATMENT  Patient: Ryan Wei (6 y.o. female)  Date: 9/29/2020  Diagnosis: [facility]congestive [facility] failure) ([facility] [I50.9]  CHF exacerbation ([facility] [I50.9]   <principal problem not specified>       Precautions: Fall  Chart, physical therapy assessment, plan of care and goals were reviewed. ASSESSMENT  Patient continues with skilled PT services and is progressing towards goals. With encouragement, patient able to ambulate today. Noted that took her ~5 minutes to walk 12 feet and once turned around to ambulate back to chair, pace much more functional and took less than 30 seconds. Reports pain is better in feet after she walked. She requires motivation to progress and likely will do well with rehab to maximize functional mobility before returning home alone. .     Current Level of Function Impacting Discharge (mobility/balance): contact guard and increased time for upright mobility.     Other factors to consider for discharge: PLAN :  Patient continues to benefit from skilled intervention to address the above impairments. Continue treatment per established plan of care. to address goals. Recommendation for discharge: (in order for the patient to meet his/her long term goals)  Therapy up to 5 days/week in SNF setting; if returns home, increased caregiver assistance to initially 24/7    This discharge recommendation:  Has been made in collaboration with the attending provider and/or case management    IF patient discharges home will need the following DME: none       SUBJECTIVE:   Patient stated My feet hurt on the bottoms.     OBJECTIVE DATA SUMMARY:   Critical Behavior:  Neurologic State: Alert  Orientation Level: Oriented X4  Cognition: Follows commands, Appropriate for age attention/concentration  Safety/Judgement: Awareness of environment  Functional Mobility Training:  Bed Mobility:     Supine to Sit: Supervision;Assist x1;Additional time;Bed Modified; Adaptive equipment              Transfers:  Sit to Stand: (P) Contact guard assistance  Stand to Sit: (P) Contact guard assistance        Bed to Chair: Contact guard assistance;Assist x1;Additional time; Adaptive equipment                    Balance:  Sitting: (P) Intact  Standing: (P) Impaired; With support  Standing - Static: (P) Good  Standing - Dynamic : (P) Good  Ambulation/Gait Training:  Distance (ft): (P) 12 Feet (ft)(8 minutes)  Assistive Device: (P) Gait belt;Walker, rolling  Ambulation - Level of Assistance: (P) Contact guard assistance        Gait Abnormalities: (P) Decreased step clearance              Speed/Samira: (P) Pace decreased (<100 feet/min)  Step Length: (P) Right shortened;Left shortened  After treatment patient left in no apparent distress:   Sitting in chair and Call bell within reach    COMMUNICATION/COLLABORATION:   The patients plan of care was discussed with: Registered nurse.      Satish Campbell, PT   Time Calculation: 15 mins

## 2020-09-29 NOTE — PROGRESS NOTES
ANDREA; 
CRM followed up with patient this am and re-read list of Skilled nursing facilities. Patient has been at the [id number] [facility] this past spring and agreed to consider going back there along with the [address] [place] [address] Avenue. Patient has [address] from [facility] is working on obtaining authorization

## 2020-09-29 NOTE — PROGRESS NOTES
Problem: Pressure Injury - Risk of  Goal: *Prevention of pressure injury  Description: Document Gregorio Scale and appropriate interventions in the flowsheet. Outcome: Progressing Towards Goal  Note: Pressure Injury Interventions:  Sensory Interventions: Check visual cues for pain, Float heels, Keep linens dry and wrinkle-free, Maintain/enhance activity level, Minimize linen layers, Turn and reposition approx. every two hours (pillows and wedges if needed)    Moisture Interventions: Absorbent underpads, Apply protective barrier, creams and emollients, Check for incontinence Q2 hours and as needed, Internal/External urinary devices, Minimize layers, Moisture barrier, Offer toileting Q_hr    Activity Interventions: Assess need for specialty bed, Chair cushion, Increase time out of bed, PT/OT evaluation    Mobility Interventions: Assess need for specialty bed, Chair cushion, Float heels, PT/OT evaluation, Turn and reposition approx. every two hours(pillow and wedges)    Nutrition Interventions: Document food/fluid/supplement intake, Offer support with meals,snacks and hydration                     Problem: Patient Education: Go to Patient Education Activity  Goal: Patient/Family Education  Outcome: Progressing Towards Goal     Problem: Falls - Risk of  Goal: *Absence of Falls  Description: Document Tito Lopez Fall Risk and appropriate interventions in the flowsheet.   Outcome: Progressing Towards Goal  Note: Fall Risk Interventions:  Mobility Interventions: Communicate number of staff needed for ambulation/transfer, Patient to call before getting OOB, PT Consult for mobility concerns, PT Consult for assist device competence, Strengthening exercises (ROM-active/passive), Utilize walker, cane, or other assistive device         Medication Interventions: Evaluate medications/consider consulting pharmacy, Patient to call before getting OOB, Teach patient to arise slowly    Elimination Interventions: Call light in reach, Patient to call for help with toileting needs, Toileting schedule/hourly rounds    History of Falls Interventions: Door open when patient unattended         Problem: Patient Education: Go to Patient Education Activity  Goal: Patient/Family Education  Outcome: Progressing Towards Goal     Problem: Pain  Goal: *Control of Pain  Outcome: Progressing Towards Goal  Goal: *PALLIATIVE CARE:  Alleviation of Pain  Outcome: Progressing Towards Goal     Problem: Patient Education: Go to Patient Education Activity  Goal: Patient/Family Education  Outcome: Progressing Towards Goal     Problem: Patient Education: Go to Patient Education Activity  Goal: Patient/Family Education  Outcome: Progressing Towards Goal     Problem: Patient Education: Go to Patient Education Activity  Goal: Patient/Family Education  Outcome: Progressing Towards Goal     Problem: Discharge Planning  Goal: *Discharge to safe environment  Outcome: Progressing Towards Goal  Goal: *Knowledge of medication management  Outcome: Progressing Towards Goal  Goal: *Knowledge of discharge instructions  Outcome: Progressing Towards Goal     Problem: Patient Education: Go to Patient Education Activity  Goal: Patient/Family Education  Outcome: Progressing Towards Goal

## 2020-09-29 NOTE — PROGRESS NOTES
Hospitalist Progress Note  Laney Kelly MD  Answering service: [phone] from in house phone        Date of Service:  2020  NAME:  Steph Mcclure  :  1940  MRN:  [id number]      Admission Summary:   As per initial admission summary  Mynor Schmitz is a 79 y.o.  female who has a history of hypertension, CVA, carotid stenosis presents with lower extremity swelling and generalized weakness. She also reports that she was unable to walk due to swelling in the legs. She reports this was insidious in onset and gradually progressing. She denies any falls. She reports that she lives alone at home and is unable to take care of regular activities of daily living. She presents to the emergency room and noted to have swelling in the lower extremities and referred for admission. Currently patient denies chest pain shortness breath nausea vomiting or diarrhea.       We were asked to admit for work up and evaluation of the above problems        Interval history / Subjective:     Patient seen for Follow up of edema  No event reported. Awaiting placement      Assessment & Plan:     # Lower extremity edema likely secondary to acute on chronic diastolic heart failure exacerbation  - Duplex lower extremities negative for DVT. - s/p IV lasix, now off due to elevated creatinine.    - strict I & o, daily weight   - Cardiology following--  Recommend she be discharge on Lasix 20 mg PO daily      # Macular rash lower extremities-- improving   - Possible cellulitis of lower extremities  - Completed IVF Ancef  - Duplex negative for DVT     # Hypertension accelerated  - ct with Coreg     # Generalized weakness  - With underlying history of Parkinson's  - PT/OT evaluation  - Prior work-up on the last admission negative for CVA.     # Parkinson disease  - Continue Sinemet     # History of coronary disease  - Continue with aspirin Plavix     # CKD stage II   - monitor and avoid nephrotoxins     # Chronic A. Fib  - Currently normal sinus rhythm. - Not on anticoagulation per determination on previous admissions possibly secondary to risk of falls     # History of chronic carotid artery stenosis  Continue with Plavix     # Hyperlipidemia  Continue statin     # Anemia of chronic disease  Monitor hemoglobin    # Left heel pain possible plantar fasciitis   - pain control, PT    Code status: Full code   DVT prophylaxis: enoxaprin    Care Plan discussed with: Patient/Family  Disposition: SNF     Hospital Problems  Date Reviewed: 5/1/2020          Codes Class Noted POA    CHF exacerbation ([facility] ICD-10-CM: I50.9  ICD-9-CM: 428.0  9/25/2020 Unknown        CHF (congestive heart failure) ([facility] ICD-10-CM: I50.9  ICD-9-CM: 428.0  9/22/2020 Unknown                Review of Systems:   A comprehensive review of systems was negative except for that written in the HPI. Vital Signs:    Last 24hrs VS reviewed since prior progress note. Most recent are:  Visit Vitals  BP (!) 122/57 (BP 1 Location: Right arm, BP Patient Position: At rest)   Pulse 60   Temp 98.3 °F (36.8 °C)   Resp 18   Ht 5' 5\" (1.651 m)   Wt 73.3 kg (161 lb 9.6 oz)   SpO2 98%   BMI 26.89 kg/m²       No intake or output data in the 24 hours ending 09/29/20 1604     Physical Examination:             Constitutional:  No acute distress,    ENT:  Oral mucous moist, oropharynx benign. Neck supple,    Resp:  CTA bilaterally. No wheezing/rhonchi/rales. No accessory muscle use   CV:  Regular rhythm, normal rate, no murmurs, gallops, rubs    GI:  Soft, non distended, non tender. normoactive bowel sounds, no hepatosplenomegaly     Musculoskeletal:  positive edema, warm, 2+ pulses throughout,     Neurologic:  Moves all extremities.   AAOx3, CN II-XII reviewed  Psy: she seems agitated             Data Review:   I personally reviewed labs and imaging       Labs:     No results for input(s): WBC, HGB, HCT, PLT, HGBEXT, HCTEXT, PLTEXT, HGBEXT, HCTEXT, PLTEXT in the last 72 hours. Recent Labs     09/29/20  0236 09/27/20  0409    138   K 4.2 4.0    107   CO2 27 26   BUN 36* 37*   CREA 1.43* 1.65*   * 117*   CA 9.8 9.3     No results for input(s): ALT, AP, TBIL, TBILI, TP, ALB, GLOB, GGT, AML, LPSE in the last 72 hours. No lab exists for component: SGOT, GPT, AMYP, HLPSE  No results for input(s): INR, PTP, APTT, INREXT, INREXT in the last 72 hours. No results for input(s): FE, TIBC, PSAT, FERR in the last 72 hours. No results found for: FOL, RBCF   No results for input(s): PH, PCO2, PO2 in the last 72 hours. No results for input(s): CPK, CKNDX, TROIQ in the last 72 hours.     No lab exists for component: CPKMB  Lab Results   Component Value Date/Time    Cholesterol, total 148 09/23/2020 04:27 AM    HDL Cholesterol 52 09/23/2020 04:27 AM    LDL, calculated 83.8 09/23/2020 04:27 AM    Triglyceride 61 09/23/2020 04:27 AM    CHOL/HDL Ratio 2.8 09/23/2020 04:27 AM     Lab Results   Component Value Date/Time    Glucose (POC) 122 (H) 09/24/2020 09:35 PM    Glucose (POC) 132 (H) 09/24/2020 04:20 PM    Glucose (POC) 118 (H) 05/11/2020 11:35 AM    Glucose (POC) 139 (H) 07/26/2019 04:34 PM    Glucose (POC) 126 (H) 07/26/2019 11:01 AM     Lab Results   Component Value Date/Time    Color YELLOW/STRAW 09/22/2020 06:12 PM    Appearance CLEAR 09/22/2020 06:12 PM    Specific gravity 1.009 09/22/2020 06:12 PM    pH (UA) 7.0 09/22/2020 06:12 PM    Protein 100 (A) 09/22/2020 06:12 PM    Glucose Negative 09/22/2020 06:12 PM    Ketone Negative 09/22/2020 06:12 PM    Bilirubin Negative 09/22/2020 06:12 PM    Urobilinogen 0.2 09/22/2020 06:12 PM    Nitrites Negative 09/22/2020 06:12 PM    Leukocyte Esterase TRACE (A) 09/22/2020 06:12 PM    Epithelial cells FEW 09/22/2020 06:12 PM    Bacteria Negative 09/22/2020 06:12 PM    WBC 0-4 09/22/2020 06:12 PM    RBC 0-5 09/22/2020 06:12 PM         Medications Reviewed:     Current Facility-Administered Medications   Medication Dose Route Frequency    hydrALAZINE (APRESOLINE) tablet 37.5 mg  37.5 mg Oral TID    hydrOXYzine HCL (ATARAX) tablet 25 mg  25 mg Oral TID PRN    HYDROcodone-acetaminophen (NORCO) 7.5-325 mg per tablet 1 Tab  1 Tab Oral Q4H PRN    enoxaparin (LOVENOX) injection 30 mg  30 mg SubCUTAneous DAILY    carvediloL (COREG) tablet 3.125 mg  3.125 mg Oral BID WITH MEALS    docusate sodium (COLACE) capsule 100 mg  100 mg Oral PRN    aspirin delayed-release tablet 81 mg  81 mg Oral DAILY    sodium chloride (NS) flush 5-40 mL  5-40 mL IntraVENous Q8H    sodium chloride (NS) flush 5-40 mL  5-40 mL IntraVENous PRN    acetaminophen (TYLENOL) tablet 650 mg  650 mg Oral Q6H PRN    Or    acetaminophen (TYLENOL) suppository 650 mg  650 mg Rectal Q6H PRN    polyethylene glycol (MIRALAX) packet 17 g  17 g Oral DAILY PRN    promethazine (PHENERGAN) tablet 12.5 mg  12.5 mg Oral Q6H PRN    Or    ondansetron (ZOFRAN) injection 4 mg  4 mg IntraVENous Q6H PRN    carbidopa-levodopa (SINEMET)  mg per tablet 2 Tab  2 Tab Oral QID    atorvastatin (LIPITOR) tablet 40 mg  40 mg Oral QHS    clopidogreL (PLAVIX) tablet 75 mg  75 mg Oral DAILY AFTER BREAKFAST    DULoxetine (CYMBALTA) capsule 120 mg  120 mg Oral DAILY    hydrALAZINE (APRESOLINE) 20 mg/mL injection 10 mg  10 mg IntraVENous Q6H PRN    famotidine (PEPCID) tablet 20 mg  20 mg Oral DAILY    amLODIPine (NORVASC) tablet 5 mg  5 mg Oral DAILY     ______________________________________________________________________  EXPECTED LENGTH OF STAY: 4d 2h  ACTUAL LENGTH OF STAY:          4                 Tonya Cedeño MD

## 2020-09-30 PROCEDURE — 74011250636 HC RX REV CODE- 250/636: Performed by: INTERNAL MEDICINE

## 2020-09-30 PROCEDURE — 97535 SELF CARE MNGMENT TRAINING: CPT

## 2020-09-30 PROCEDURE — 74011250637 HC RX REV CODE- 250/637: Performed by: INTERNAL MEDICINE

## 2020-09-30 PROCEDURE — 74011250637 HC RX REV CODE- 250/637: Performed by: PHYSICIAN ASSISTANT

## 2020-09-30 PROCEDURE — 77030038269 HC DRN EXT URIN PURWCK BARD -A

## 2020-09-30 PROCEDURE — 65270000029 HC RM PRIVATE

## 2020-09-30 PROCEDURE — 74011250637 HC RX REV CODE- 250/637: Performed by: HOSPITALIST

## 2020-09-30 RX ORDER — ENOXAPARIN SODIUM 100 MG/ML
40 INJECTION SUBCUTANEOUS DAILY
Status: DISCONTINUED | OUTPATIENT
Start: 2020-10-01 | End: 2020-10-04 | Stop reason: HOSPADM

## 2020-09-30 RX ADMIN — HYDROCODONE BITARTRATE AND ACETAMINOPHEN 1 TABLET: 7.5; 325 TABLET ORAL at 09:42

## 2020-09-30 RX ADMIN — ENOXAPARIN SODIUM 30 MG: 30 INJECTION SUBCUTANEOUS at 09:02

## 2020-09-30 RX ADMIN — CARBIDOPA AND LEVODOPA 2 TABLET: 25; 100 TABLET ORAL at 09:08

## 2020-09-30 RX ADMIN — CARVEDILOL 3.12 MG: 3.12 TABLET, FILM COATED ORAL at 09:07

## 2020-09-30 RX ADMIN — ATORVASTATIN CALCIUM 40 MG: 40 TABLET, FILM COATED ORAL at 09:08

## 2020-09-30 RX ADMIN — CARVEDILOL 3.12 MG: 3.12 TABLET, FILM COATED ORAL at 17:13

## 2020-09-30 RX ADMIN — AMLODIPINE BESYLATE 5 MG: 5 TABLET ORAL at 09:06

## 2020-09-30 RX ADMIN — HYDRALAZINE HYDROCHLORIDE 37.5 MG: 25 TABLET, FILM COATED ORAL at 23:12

## 2020-09-30 RX ADMIN — FAMOTIDINE 20 MG: 20 TABLET ORAL at 09:07

## 2020-09-30 RX ADMIN — HYDRALAZINE HYDROCHLORIDE 37.5 MG: 25 TABLET, FILM COATED ORAL at 17:14

## 2020-09-30 RX ADMIN — CARBIDOPA AND LEVODOPA 2 TABLET: 25; 100 TABLET ORAL at 17:12

## 2020-09-30 RX ADMIN — HYDRALAZINE HYDROCHLORIDE 37.5 MG: 25 TABLET, FILM COATED ORAL at 09:07

## 2020-09-30 RX ADMIN — CARBIDOPA AND LEVODOPA 2 TABLET: 25; 100 TABLET ORAL at 12:23

## 2020-09-30 RX ADMIN — CARBIDOPA AND LEVODOPA 2 TABLET: 25; 100 TABLET ORAL at 23:13

## 2020-09-30 RX ADMIN — DULOXETINE 120 MG: 60 CAPSULE, DELAYED RELEASE ORAL at 09:07

## 2020-09-30 RX ADMIN — CLOPIDOGREL BISULFATE 75 MG: 75 TABLET ORAL at 09:08

## 2020-09-30 RX ADMIN — Medication 10 ML: at 23:13

## 2020-09-30 RX ADMIN — ASPIRIN 81 MG: 81 TABLET, COATED ORAL at 09:04

## 2020-09-30 RX ADMIN — Medication 10 ML: at 14:44

## 2020-09-30 NOTE — PROGRESS NOTES
Physical Therapy  Attempted to see patient twice today,  Working with OT and then in bathroom. Will follow up tomorrow.   Marcellus Bynum, PT

## 2020-09-30 NOTE — PROGRESS NOTES
Problem: Self Care Deficits Care Plan (Adult)  Goal: *Acute Goals and Plan of Care (Insert Text)  Description:   FUNCTIONAL STATUS PRIOR TO ADMISSION: Patient was modified independent using a rolling walker for functional mobility. Pt stopped driving months ago   Pt reports having an aide come to her house 1x per week for 4 hours. HOME SUPPORT: The patient lived alone with a niece who occasionally was able to provide assistance. Occupational Therapy Goals  Initiated 9/23/2020  1. Patient will perform grooming standing at sink with modified independence within 7 day(s). 2.  Patient will perform bathing with supervision/set-up within 7 day(s). 3.  Patient will perform lower body dressing using AE with modified independence within 7 day(s). 4.  Patient will perform toilet transfers with modified independence within 7 day(s). 5.  Patient will perform all aspects of toileting with modified independence within 7 day(s). 6.  Patient will utilize energy conservation techniques during functional activities with verbal cues within 7 day(s). Outcome: Progressing Towards Goal   OCCUPATIONAL THERAPY TREATMENT  Patient: Antonina Amaya (56 y.o. female)  Date: 9/30/2020  Diagnosis: CH[facility]congestive [facility] failure) [facility] [I50.9]  CHF exacerbation ([facility] [I50.9]   <principal problem not specified>       Precautions: Fall  Chart, occupational therapy assessment, plan of care, and goals were reviewed. ASSESSMENT  Patient continues with skilled OT services and is progressing towards goals. Participation impacted by impaired reach to LEs and buttocks, impaired dynamic standing balance for LE self care, report of bilateral foot pain in standing at a 8/10 along with numbness (patient medicated one hour earlier for pain),  urinary incontinence. Patient making progress. Ambulated to toilet this date with RW and CGA, but declined transfer to toilet.  States toilet too low and declined having therapist place [facility] over toilet. Progressing daily with encouragement as patient somewhat self limiting. Current Level of Function Impacting Discharge (ADLs):  UE self care with set up to min assist, LE self care with min to moderate assist, functional transfers with CGA and RW to [facility] and bathroom toilet (patient declining use of bathroom toilet this date, but able to ambulate to toilet with RW and CGA. Other factors to consider for discharge: Lives alone         PLAN :  Patient continues to benefit from skilled intervention to address the above impairments. Continue treatment per established plan of care. to address goals. Recommend with staff: Joe  in chair for meals and self care, toileting on BSC versus toilet with RW and CGA of 1,  UE self care with set up to min assist, LE self care with min to moderate assist    Recommend next OT session: transfer to toilet in bathroom, 1 grooming activity at sink in standing    Recommendation for discharge: (in order for the patient to meet his/her long term goals)  Therapy up to 5 days/week in SNF setting    This discharge recommendation:  Has been made in collaboration with the attending provider and/or case management    IF patient discharges home will need the following DME: bedside commode       SUBJECTIVE:   Patient stated It's too low.  referring to toilet    OBJECTIVE DATA SUMMARY:   Cognitive/Behavioral Status:  Neurologic State: Alert  Orientation Level: Oriented to person;Oriented to place;Oriented to situation  Cognition: Decreased attention/concentration; Follows commands  Perception: Appears intact  Perseveration: No perseveration noted  Safety/Judgement: Awareness of environment    Functional Mobility and Transfers for ADLs:  Bed Mobility:  Rolling: Modified independent  Supine to Sit: Supervision;Bed Modified;Assist x1  Scooting: Modified independent    Transfers:  Sit to Stand: Contact guard assistance;Assist x1;Additional time; Adaptive equipment  Functional Transfers  Adaptive Equipment: Walker (comment)  Bed to Chair: Contact guard assistance;Assist x1;Additional time; Adaptive equipment    Balance:  Sitting: Intact; Without support  Standing: Impaired; With support  Standing - Static: Good  Standing - Dynamic : Fair    ADL Intervention:  Feeding  Feeding Assistance: Independent          Toileting  Bladder Hygiene: Set-up  Bowel Hygiene: Minimum assistance  Clothing Management: Moderate assistance  Cues: Physical assistance for pants down;Physical assistance for pants up;Verbal cues provided  Adaptive Equipment: Walker    Cognitive Retraining  Attention to Task: Single task  Following Commands: Follows one step commands/directions  Safety/Judgement: Awareness of environment  Cues: Tactile cues provided;Verbal cues provided    Activity Tolerance:   Fair  Please refer to the flowsheet for vital signs taken during this treatment. After treatment patient left in no apparent distress:   Sitting in chair, Heels elevated for pressure relief, and Call bell within reach    COMMUNICATION/COLLABORATION:   The patients plan of care was discussed with: Registered nurse.      IVETTE Bello  Time Calculation: 25 mins

## 2020-09-30 NOTE — PROGRESS NOTES
Lovenox Monitoring  Indication: DVT Prophylaxis  Recent Labs     09/29/20  0236   CREA 1.43*     Current Weight: 72.9 kg  Est. CrCl = >30 ml/min  Current Dose: 30 mg subcutaneously every 24 hours.   Plan: Change to 40 mg subcutaneously every 24 hours for CrCl > 30 mL/min    Thank you,  Morena Hull, PHARMD, BCPS

## 2020-09-30 NOTE — PROGRESS NOTES
Spiritual Care Assessment/Progress Note  [facility]      NAME: Dilip Cuevas      MRN: [id number]  AGE: 79 y.o. SEX: female  Protestant Affiliation: Oriental orthodox   Language: English     9/30/2020     Total Time (in minutes): 8     Spiritual Assessment begun in [facility] through conversation with:         [x]Patient        [] Family    [] Friend(s)        Reason for Consult: Initial/Spiritual assessment, patient floor     Spiritual beliefs: (Please include comment if needed)     [] Identifies with a avtar tradition:         [] Supported by a avtar community:            [] Claims no spiritual orientation:           [] Seeking spiritual identity:                [] Adheres to an individual form of spirituality:           [x] Not able to assess:                           Identified resources for coping:      [] Prayer                               [] Music                  [] Guided Imagery     [] Family/friends                 [] Pet visits     [] Devotional reading                         [x] Unknown     [] Other:                                             Interventions offered during this visit: (See comments for more details)    Patient Interventions: Other (comment)(Unable to assess)           Plan of Care:     [] Support spiritual and/or cultural needs    [] Support AMD and/or advance care planning process      [] Support grieving process   [] Coordinate Rites and/or Rituals    [] Coordination with community clergy   [] No spiritual needs identified at this time   [] Detailed Plan of Care below (See Comments)  [] Make referral to Music Therapy  [] Make referral to Pet Therapy     [] Make referral to Addiction services  [] Make referral to [facility]  [] Make referral to Spiritual Care Partner  [] No future visits requested        [x] Follow up visits as needed     Comments:  visited Mrs. Ramos for an initial spiritual assessment on the Med. Oncology unit. Mrs. Ramos is on contact precautions so  consulted with her RN who shared that Mrs. Ramos would be able to have a conversation with the  via telephone. While on the unit,  attempted to call into Mrs. Ramos's room. She did not come to the phone at this time. 's will follow up as able and/or needed  Rachel . [facility].      Paging Service: [phone])

## 2020-10-01 LAB
COVID-19, XGCOVT: NOT DETECTED
HEALTH STATUS, XMCV2T: NORMAL
SOURCE, COVRS: NORMAL
SPECIMEN SOURCE, FCOV2M: NORMAL
SPECIMEN TYPE, XMCV1T: NORMAL

## 2020-10-01 PROCEDURE — 74011250636 HC RX REV CODE- 250/636: Performed by: HOSPITALIST

## 2020-10-01 PROCEDURE — 74011250636 HC RX REV CODE- 250/636: Performed by: INTERNAL MEDICINE

## 2020-10-01 PROCEDURE — 74011250637 HC RX REV CODE- 250/637: Performed by: HOSPITALIST

## 2020-10-01 PROCEDURE — 65270000029 HC RM PRIVATE

## 2020-10-01 PROCEDURE — 74011250637 HC RX REV CODE- 250/637: Performed by: PHYSICIAN ASSISTANT

## 2020-10-01 RX ADMIN — HYDRALAZINE HYDROCHLORIDE 37.5 MG: 25 TABLET, FILM COATED ORAL at 22:46

## 2020-10-01 RX ADMIN — CARVEDILOL 3.12 MG: 3.12 TABLET, FILM COATED ORAL at 08:53

## 2020-10-01 RX ADMIN — ATORVASTATIN CALCIUM 40 MG: 40 TABLET, FILM COATED ORAL at 08:54

## 2020-10-01 RX ADMIN — Medication 10 ML: at 07:06

## 2020-10-01 RX ADMIN — FAMOTIDINE 20 MG: 20 TABLET ORAL at 08:54

## 2020-10-01 RX ADMIN — DULOXETINE 120 MG: 60 CAPSULE, DELAYED RELEASE ORAL at 08:54

## 2020-10-01 RX ADMIN — AMLODIPINE BESYLATE 5 MG: 5 TABLET ORAL at 08:54

## 2020-10-01 RX ADMIN — ASPIRIN 81 MG: 81 TABLET, COATED ORAL at 08:54

## 2020-10-01 RX ADMIN — CARBIDOPA AND LEVODOPA 2 TABLET: 25; 100 TABLET ORAL at 17:12

## 2020-10-01 RX ADMIN — CARBIDOPA AND LEVODOPA 2 TABLET: 25; 100 TABLET ORAL at 22:46

## 2020-10-01 RX ADMIN — ENOXAPARIN SODIUM 40 MG: 40 INJECTION SUBCUTANEOUS at 08:55

## 2020-10-01 RX ADMIN — Medication 10 ML: at 13:28

## 2020-10-01 RX ADMIN — CARBIDOPA AND LEVODOPA 2 TABLET: 25; 100 TABLET ORAL at 08:53

## 2020-10-01 RX ADMIN — CARBIDOPA AND LEVODOPA 2 TABLET: 25; 100 TABLET ORAL at 13:28

## 2020-10-01 RX ADMIN — CLOPIDOGREL BISULFATE 75 MG: 75 TABLET ORAL at 08:54

## 2020-10-01 RX ADMIN — HYDRALAZINE HYDROCHLORIDE 37.5 MG: 25 TABLET, FILM COATED ORAL at 17:12

## 2020-10-01 RX ADMIN — HYDRALAZINE HYDROCHLORIDE 10 MG: 20 INJECTION INTRAMUSCULAR; INTRAVENOUS at 07:04

## 2020-10-01 RX ADMIN — CARVEDILOL 3.12 MG: 3.12 TABLET, FILM COATED ORAL at 17:12

## 2020-10-01 RX ADMIN — Medication 10 ML: at 22:47

## 2020-10-01 RX ADMIN — HYDRALAZINE HYDROCHLORIDE 37.5 MG: 25 TABLET, FILM COATED ORAL at 08:54

## 2020-10-01 NOTE — PROGRESS NOTES
TRANSITION OF CARE PLAN   RUR 24% HIGH RISK    DISPOSITION--Accepted to [facility] pending insurance authorization     TRANSPORT--[facility] vs. Family via private car    Spoke with Devon Ryan Liaison [phone] to check status. Insurance authorization still pending. Will need updated therapy notes. Therapy team unable to work with patient today. Plan continue to follow-up next day. CM to follow.      Emilio Felty, MSW,CRM

## 2020-10-01 NOTE — PROGRESS NOTES
Hospitalist Progress Note  Anabel Newton MD  Answering service: [phone] from in house phone        Date of Service:  10/1/2020  NAME:  Elisabet Matthew  :  1940  MRN:  [id number]      Admission Summary:   As per initial admission summary  Dada Gallegos is a 79 y.o.  female who has a history of hypertension, CVA, carotid stenosis presents with lower extremity swelling and generalized weakness. She also reports that she was unable to walk due to swelling in the legs. She reports this was insidious in onset and gradually progressing. She denies any falls. She reports that she lives alone at home and is unable to take care of regular activities of daily living. She presents to the emergency room and noted to have swelling in the lower extremities and referred for admission. Currently patient denies chest pain shortness breath nausea vomiting or diarrhea.       We were asked to admit for work up and evaluation of the above problems        Interval history / Subjective:     She complaints of b/l chronic foot pain. Assessment & Plan:     # Lower extremity edema likely secondary to acute on chronic diastolic heart failure exacerbation-improved  - Duplex lower extremities negative for DVT. - received IV lasix.    - strict I & o, daily weight   - Cardiology following--  Recommend she be discharge on Lasix 20 mg PO daily   -Cr on - 1.43 which is improved, repeat labs tomorrow  She is now euvolemic     # Macular rash lower extremities-- resolved   - Possible cellulitis of lower extremities  - Completed IVF Ancef  - Duplex negative for DVT     # Hypertension :  - ct with Coreg,hydralazine     # Generalized weakness  - With underlying history of Parkinson's  - PT/OT evaluation-need rehab at discharge  - Prior work-up on the last admission negative for CVA.     # Parkinson disease  - Continue Sinemet     # History of coronary disease  - Continue with aspirin Plavix     # CKD stage II   - monitor and avoid nephrotoxins     # Chronic A. Fib  - Currently normal sinus rhythm. - Not on anticoagulation per determination on previous admissions possibly secondary to risk of falls     # History of chronic carotid artery stenosis  Continue with Plavix     # Hyperlipidemia  Continue statin     # Anemia of chronic disease  Monitor hemoglobin    Suspected peripheral neuropathy:  -already on cymbalta    Code status: Full code   DVT prophylaxis: [address] discussed with: Patient/Family  Disposition: SNF     Hospital Problems  Date Reviewed: 5/1/2020          Codes Class Noted POA    CHF exacerbation ([facility] [address] ICD-10-CM: I50.9  ICD-9-CM: 428.0  9/25/2020 Unknown        CHF (congestive heart failure) ([facility] [address] ICD-10-CM: I50.9  ICD-9-CM: 428.0  9/22/2020 Unknown                Review of Systems:   A comprehensive review of systems was negative except for that written in the HPI. Vital Signs:    Last 24hrs VS reviewed since prior progress note. Most recent are:  Visit Vitals  /62   Pulse 67   Temp 97.9 °F (36.6 °C)   Resp 19   Ht 5' 5\" (1.651 m)   Wt 77.5 kg (170 lb 13.7 oz)   SpO2 97%   BMI 28.43 kg/m²       No intake or output data in the 24 hours ending 10/01/20 1710     Physical Examination:             Constitutional:  elderly patient    ENT:  Oral mucous moist, Neck supple,    Resp:  CTA bilaterally. No wheezing/rhonchi/rales. No accessory muscle use   CV:  Regular rhythm, normal rate    GI:  Soft, non distended, non tender, normoactive bowel sounds    Musculoskeletal:  no LE edema    Neurologic:  Moves all extremities. AAOx3  Psy: calm            Data Review:   I personally reviewed labs and imaging results      Labs:     No results for input(s): WBC, HGB, HCT, PLT, HGBEXT, HCTEXT, PLTEXT, HGBEXT, HCTEXT, PLTEXT in the last 72 hours.   Recent Labs     09/29/20  0236      K 4.2      CO2 27   BUN 36*   CREA 1.43*   *   CA 9.8     No results for input(s): ALT, AP, TBIL, TBILI, TP, ALB, GLOB, GGT, AML, LPSE in the last 72 hours. No lab exists for component: SGOT, GPT, AMYP, HLPSE  No results for input(s): INR, PTP, APTT, INREXT, INREXT in the last 72 hours. No results for input(s): FE, TIBC, PSAT, FERR in the last 72 hours. No results found for: FOL, RBCF   No results for input(s): PH, PCO2, PO2 in the last 72 hours. No results for input(s): CPK, CKNDX, TROIQ in the last 72 hours.     No lab exists for component: CPKMB  Lab Results   Component Value Date/Time    Cholesterol, total 148 09/23/2020 04:27 AM    HDL Cholesterol 52 09/23/2020 04:27 AM    LDL, calculated 83.8 09/23/2020 04:27 AM    Triglyceride 61 09/23/2020 04:27 AM    CHOL/HDL Ratio 2.8 09/23/2020 04:27 AM     Lab Results   Component Value Date/Time    Glucose (POC) 122 (H) 09/24/2020 09:35 PM    Glucose (POC) 132 (H) 09/24/2020 04:20 PM    Glucose (POC) 118 (H) 05/11/2020 11:35 AM    Glucose (POC) 139 (H) 07/26/2019 04:34 PM    Glucose (POC) 126 (H) 07/26/2019 11:01 AM     Lab Results   Component Value Date/Time    Color YELLOW/STRAW 09/22/2020 06:12 PM    Appearance CLEAR 09/22/2020 06:12 PM    Specific gravity 1.009 09/22/2020 06:12 PM    pH (UA) 7.0 09/22/2020 06:12 PM    Protein 100 (A) 09/22/2020 06:12 PM    Glucose Negative 09/22/2020 06:12 PM    Ketone Negative 09/22/2020 06:12 PM    Bilirubin Negative 09/22/2020 06:12 PM    Urobilinogen 0.2 09/22/2020 06:12 PM    Nitrites Negative 09/22/2020 06:12 PM    Leukocyte Esterase TRACE (A) 09/22/2020 06:12 PM    Epithelial cells FEW 09/22/2020 06:12 PM    Bacteria Negative 09/22/2020 06:12 PM    WBC 0-4 09/22/2020 06:12 PM    RBC 0-5 09/22/2020 06:12 PM         Medications Reviewed:     Current Facility-Administered Medications   Medication Dose Route Frequency    enoxaparin (LOVENOX) injection 40 mg  40 mg SubCUTAneous DAILY    hydrALAZINE (APRESOLINE) tablet 37.5 mg  37.5 mg Oral TID    hydrOXYzine HCL (ATARAX) tablet 25 mg  25 mg Oral TID PRN    HYDROcodone-acetaminophen (NORCO) 7.5-325 mg per tablet 1 Tab  1 Tab Oral Q4H PRN    carvediloL (COREG) tablet 3.125 mg  3.125 mg Oral BID WITH MEALS    docusate sodium (COLACE) capsule 100 mg  100 mg Oral PRN    aspirin delayed-release tablet 81 mg  81 mg Oral DAILY    sodium chloride (NS) flush 5-40 mL  5-40 mL IntraVENous Q8H    sodium chloride (NS) flush 5-40 mL  5-40 mL IntraVENous PRN    acetaminophen (TYLENOL) tablet 650 mg  650 mg Oral Q6H PRN    Or    acetaminophen (TYLENOL) suppository 650 mg  650 mg Rectal Q6H PRN    polyethylene glycol (MIRALAX) packet 17 g  17 g Oral DAILY PRN    promethazine (PHENERGAN) tablet 12.5 mg  12.5 mg Oral Q6H PRN    Or    ondansetron (ZOFRAN) injection 4 mg  4 mg IntraVENous Q6H PRN    carbidopa-levodopa (SINEMET)  mg per tablet 2 Tab  2 Tab Oral QID    atorvastatin (LIPITOR) tablet 40 mg  40 mg Oral QHS    clopidogreL (PLAVIX) tablet 75 mg  75 mg Oral DAILY AFTER BREAKFAST    DULoxetine (CYMBALTA) capsule 120 mg  120 mg Oral DAILY    hydrALAZINE (APRESOLINE) 20 mg/mL injection 10 mg  10 mg IntraVENous Q6H PRN    famotidine (PEPCID) tablet 20 mg  20 mg Oral DAILY    amLODIPine (NORVASC) tablet 5 mg  5 mg Oral DAILY     ______________________________________________________________________  EXPECTED LENGTH OF STAY: 4d 2h  ACTUAL LENGTH OF STAY:          6                 Gerry Davison MD

## 2020-10-01 NOTE — PROGRESS NOTES
Physical Therapy  10/1/2020    Chart reviewed and cleared by RN. Pt received supine in bed and agreeable to therapy but then declines at this time d/t need \"to digest food. \" Pt had last eaten over an hour prior. Second attempt and pt reporting she is \"too hot. \" Given option of transferring to chair under A/C though pt continues to decline. Will follow up next tx day.     Thank Du Ellsworth, PT, DPT

## 2020-10-02 ENCOUNTER — APPOINTMENT (OUTPATIENT)
Dept: CT IMAGING | Age: 80
DRG: 291 | End: 2020-10-02
Attending: HOSPITALIST
Payer: MEDICARE

## 2020-10-02 LAB
ALBUMIN SERPL-MCNC: 3 G/DL (ref 3.5–5)
ANION GAP SERPL CALC-SCNC: 4 MMOL/L (ref 5–15)
ARTERIAL PATENCY WRIST A: YES
ATRIAL RATE: 59 BPM
BASE DEFICIT BLD-SCNC: 1 MMOL/L
BASOPHILS # BLD: 0.1 K/UL (ref 0–0.1)
BASOPHILS NFR BLD: 1 % (ref 0–1)
BDY SITE: ABNORMAL
BUN SERPL-MCNC: 36 MG/DL (ref 6–20)
BUN/CREAT SERPL: 27 (ref 12–20)
CA-I BLD-SCNC: 1.42 MMOL/L (ref 1.12–1.32)
CALCIUM SERPL-MCNC: 9.7 MG/DL (ref 8.5–10.1)
CALCULATED P AXIS, ECG09: -7 DEGREES
CALCULATED R AXIS, ECG10: -52 DEGREES
CALCULATED T AXIS, ECG11: 87 DEGREES
CHLORIDE SERPL-SCNC: 109 MMOL/L (ref 97–108)
CO2 SERPL-SCNC: 26 MMOL/L (ref 21–32)
CREAT SERPL-MCNC: 1.34 MG/DL (ref 0.55–1.02)
DIAGNOSIS, 93000: NORMAL
DIFFERENTIAL METHOD BLD: ABNORMAL
EOSINOPHIL # BLD: 0.3 K/UL (ref 0–0.4)
EOSINOPHIL NFR BLD: 5 % (ref 0–7)
ERYTHROCYTE [DISTWIDTH] IN BLOOD BY AUTOMATED COUNT: 13.6 % (ref 11.5–14.5)
GAS FLOW.O2 O2 DELIVERY SYS: ABNORMAL L/MIN
GLUCOSE BLD STRIP.AUTO-MCNC: 146 MG/DL (ref 65–100)
GLUCOSE BLD STRIP.AUTO-MCNC: 181 MG/DL (ref 65–100)
GLUCOSE SERPL-MCNC: 121 MG/DL (ref 65–100)
HCO3 BLD-SCNC: 23.2 MMOL/L (ref 22–26)
HCT VFR BLD AUTO: 32.5 % (ref 35–47)
HGB BLD-MCNC: 10.4 G/DL (ref 11.5–16)
IMM GRANULOCYTES # BLD AUTO: 0 K/UL (ref 0–0.04)
IMM GRANULOCYTES NFR BLD AUTO: 0 % (ref 0–0.5)
LYMPHOCYTES # BLD: 1.3 K/UL (ref 0.8–3.5)
LYMPHOCYTES NFR BLD: 25 % (ref 12–49)
MCH RBC QN AUTO: 29.8 PG (ref 26–34)
MCHC RBC AUTO-ENTMCNC: 32 G/DL (ref 30–36.5)
MCV RBC AUTO: 93.1 FL (ref 80–99)
MONOCYTES # BLD: 0.7 K/UL (ref 0–1)
MONOCYTES NFR BLD: 13 % (ref 5–13)
NEUTS SEG # BLD: 2.9 K/UL (ref 1.8–8)
NEUTS SEG NFR BLD: 56 % (ref 32–75)
NRBC # BLD: 0 K/UL (ref 0–0.01)
NRBC BLD-RTO: 0 PER 100 WBC
P-R INTERVAL, ECG05: 170 MS
PCO2 BLD: 33.5 MMHG (ref 35–45)
PH BLD: 7.45 [PH] (ref 7.35–7.45)
PHOSPHATE SERPL-MCNC: 3.8 MG/DL (ref 2.6–4.7)
PLATELET # BLD AUTO: 218 K/UL (ref 150–400)
PMV BLD AUTO: 9.1 FL (ref 8.9–12.9)
PO2 BLD: 81 MMHG (ref 80–100)
POTASSIUM SERPL-SCNC: 4 MMOL/L (ref 3.5–5.1)
Q-T INTERVAL, ECG07: 450 MS
QRS DURATION, ECG06: 108 MS
QTC CALCULATION (BEZET), ECG08: 445 MS
RBC # BLD AUTO: 3.49 M/UL (ref 3.8–5.2)
SAO2 % BLD: 96 % (ref 92–97)
SERVICE CMNT-IMP: ABNORMAL
SERVICE CMNT-IMP: ABNORMAL
SODIUM SERPL-SCNC: 139 MMOL/L (ref 136–145)
SPECIMEN TYPE: ABNORMAL
VENTRICULAR RATE, ECG03: 59 BPM
WBC # BLD AUTO: 5.2 K/UL (ref 3.6–11)

## 2020-10-02 PROCEDURE — 82803 BLOOD GASES ANY COMBINATION: CPT

## 2020-10-02 PROCEDURE — 74011250636 HC RX REV CODE- 250/636: Performed by: HOSPITALIST

## 2020-10-02 PROCEDURE — 97530 THERAPEUTIC ACTIVITIES: CPT

## 2020-10-02 PROCEDURE — 97116 GAIT TRAINING THERAPY: CPT

## 2020-10-02 PROCEDURE — 65270000029 HC RM PRIVATE

## 2020-10-02 PROCEDURE — 36600 WITHDRAWAL OF ARTERIAL BLOOD: CPT

## 2020-10-02 PROCEDURE — 82962 GLUCOSE BLOOD TEST: CPT

## 2020-10-02 PROCEDURE — 74011250637 HC RX REV CODE- 250/637: Performed by: PHYSICIAN ASSISTANT

## 2020-10-02 PROCEDURE — 74011250637 HC RX REV CODE- 250/637: Performed by: HOSPITALIST

## 2020-10-02 PROCEDURE — 80069 RENAL FUNCTION PANEL: CPT

## 2020-10-02 PROCEDURE — 93005 ELECTROCARDIOGRAM TRACING: CPT

## 2020-10-02 PROCEDURE — 70450 CT HEAD/BRAIN W/O DYE: CPT

## 2020-10-02 PROCEDURE — 74011250636 HC RX REV CODE- 250/636: Performed by: INTERNAL MEDICINE

## 2020-10-02 PROCEDURE — 85025 COMPLETE CBC W/AUTO DIFF WBC: CPT

## 2020-10-02 PROCEDURE — 36415 COLL VENOUS BLD VENIPUNCTURE: CPT

## 2020-10-02 RX ADMIN — POLYETHYLENE GLYCOL 3350 17 G: 17 POWDER, FOR SOLUTION ORAL at 18:30

## 2020-10-02 RX ADMIN — HYDRALAZINE HYDROCHLORIDE 10 MG: 20 INJECTION INTRAMUSCULAR; INTRAVENOUS at 07:30

## 2020-10-02 RX ADMIN — CARVEDILOL 3.12 MG: 3.12 TABLET, FILM COATED ORAL at 16:31

## 2020-10-02 RX ADMIN — CLOPIDOGREL BISULFATE 75 MG: 75 TABLET ORAL at 10:50

## 2020-10-02 RX ADMIN — Medication 10 ML: at 16:32

## 2020-10-02 RX ADMIN — CARBIDOPA AND LEVODOPA 2 TABLET: 25; 100 TABLET ORAL at 18:30

## 2020-10-02 RX ADMIN — CARBIDOPA AND LEVODOPA 2 TABLET: 25; 100 TABLET ORAL at 13:53

## 2020-10-02 RX ADMIN — HYDRALAZINE HYDROCHLORIDE 37.5 MG: 25 TABLET, FILM COATED ORAL at 10:50

## 2020-10-02 RX ADMIN — FAMOTIDINE 20 MG: 20 TABLET ORAL at 10:50

## 2020-10-02 RX ADMIN — AMLODIPINE BESYLATE 5 MG: 5 TABLET ORAL at 10:49

## 2020-10-02 RX ADMIN — Medication 10 ML: at 20:45

## 2020-10-02 RX ADMIN — CARBIDOPA AND LEVODOPA 2 TABLET: 25; 100 TABLET ORAL at 10:50

## 2020-10-02 RX ADMIN — ATORVASTATIN CALCIUM 40 MG: 40 TABLET, FILM COATED ORAL at 10:49

## 2020-10-02 RX ADMIN — ASPIRIN 81 MG: 81 TABLET, COATED ORAL at 10:49

## 2020-10-02 RX ADMIN — HYDRALAZINE HYDROCHLORIDE 37.5 MG: 25 TABLET, FILM COATED ORAL at 20:46

## 2020-10-02 RX ADMIN — ENOXAPARIN SODIUM 40 MG: 40 INJECTION SUBCUTANEOUS at 09:47

## 2020-10-02 RX ADMIN — HYDRALAZINE HYDROCHLORIDE 37.5 MG: 25 TABLET, FILM COATED ORAL at 16:31

## 2020-10-02 RX ADMIN — DULOXETINE 120 MG: 60 CAPSULE, DELAYED RELEASE ORAL at 10:50

## 2020-10-02 RX ADMIN — CARBIDOPA AND LEVODOPA 2 TABLET: 25; 100 TABLET ORAL at 20:47

## 2020-10-02 RX ADMIN — Medication 10 ML: at 07:32

## 2020-10-02 RX ADMIN — CARVEDILOL 3.12 MG: 3.12 TABLET, FILM COATED ORAL at 07:30

## 2020-10-02 NOTE — PROGRESS NOTES
Hospitalist Progress Note  Kendall Helton MD  Answering service: [phone] OR [phone] from in house phone        Date of Service:  10/2/2020  NAME:  Kia Hook  :  1940  MRN:  [id number]      Admission Summary:   As per initial admission summary  Malik Taylor is a 79 y.o.  female who has a history of hypertension, CVA, carotid stenosis presents with lower extremity swelling and generalized weakness. She also reports that she was unable to walk due to swelling in the legs. She reports this was insidious in onset and gradually progressing. She denies any falls. She reports that she lives alone at home and is unable to take care of regular activities of daily living. She presents to the emergency room and noted to have swelling in the lower extremities and referred for admission. Currently patient denies chest pain shortness breath nausea vomiting or diarrhea.       We were asked to admit for work up and evaluation of the above problems        Interval history / Subjective:    RRT was called this morning as she was waking up during RN eval.  She woke up this morning and had breakfast. Later when RN went to evaluate her-she did not wake up. During my eval,patient became awake and alert after a sternal rub. Was able to answer questions slowly and followed commands. CT head was negative. Tele neurologist evaluated patient-recommended neuro consult for adjustment of parkinson's meds       Assessment & Plan:     # Transient altered mental status:resolved  -ABG,blood glucose wnl. No acute abnormalities on CT head. -? Deep sleep vs related to parkinson's-neurology consulted. Lower extremity edema likely secondary to acute on chronic diastolic heart failure exacerbation-resolved  - Duplex lower extremities negative for DVT. - received IV lasix.    - strict I & o, daily weight   - Cardiology following--  Recommend she be discharge on Lasix 20 mg PO daily   -Cr on 9/29- 1.43 which is improved, renal function stable. She is now euvolemic     # Macular rash lower extremities-- resolved   - Possible cellulitis of lower extremities  - Completed IVF Ancef  - Duplex negative for DVT     # Hypertension :  - ct with Coreg,hydralazine     # Generalized weakness  - With underlying history of Parkinson's  - PT/OT evaluation-need rehab at discharge     # Parkinson disease  - Continue Sinemet     # History of coronary disease  - Continue with aspirin Plavix     # CKD stage II   - monitor and avoid nephrotoxins     # Chronic A. Fib  - Currently normal sinus rhythm. - Not on anticoagulation per determination on previous admissions possibly secondary to risk of falls     # History of chronic carotid artery stenosis  Continue with Plavix     # Hyperlipidemia  Continue statin     # Anemia of chronic disease  Monitor hemoglobin    Suspected peripheral neuropathy:  -already on cymbalta    Code status: Full code   DVT prophylaxis: [address] discussed with: Patient/Family  Disposition: SNF     Hospital Problems  Date Reviewed: 5/1/2020          Codes Class Noted POA    CHF exacerbation ([facility] ICD-10-CM: I50.9  ICD-9-CM: 428.0  9/25/2020 Unknown        CHF (congestive heart failure) ([facility] [address] ICD-10-CM: I50.9  ICD-9-CM: 428.0  9/22/2020 Unknown                Review of Systems:   A comprehensive review of systems was negative except for that written in the HPI. Vital Signs:    Last 24hrs VS reviewed since prior progress note.  Most recent are:  Visit Vitals  BP (!) 173/72 (BP 1 Location: Right arm, BP Patient Position: At rest)   Pulse (!) 57   Temp 98.7 °F (37.1 °C)   Resp 17   Ht 5' 5\" (1.651 m)   Wt 73.1 kg (161 lb 3.2 oz)   SpO2 96%   BMI 26.83 kg/m²       No intake or output data in the 24 hours ending 10/02/20 1850     Physical Examination:             Constitutional:  elderly patient    ENT:  Oral mucous moist, Neck supple,    Resp:  CTA bilaterally. No wheezing/rhonchi/rales. No accessory muscle use   CV:  Regular rhythm, normal rate    GI:  Soft, non distended, non tender, normoactive bowel sounds    Musculoskeletal:  no LE edema    Neurologic:  Moves all extremities,slow to respond,falling asleep during conversation. Psy: calm            Data Review:   I personally reviewed labs and imaging results      Labs:     Recent Labs     10/02/20  0516   WBC 5.2   HGB 10.4*   HCT 32.5*        Recent Labs     10/02/20  0516      K 4.0   *   CO2 26   BUN 36*   CREA 1.34*   *   CA 9.7   PHOS 3.8     Recent Labs     10/02/20  0516   ALB 3.0*     No results for input(s): INR, PTP, APTT, INREXT, INREXT in the last 72 hours. No results for input(s): FE, TIBC, PSAT, FERR in the last 72 hours. No results found for: FOL, RBCF   No results for input(s): PH, PCO2, PO2 in the last 72 hours. No results for input(s): CPK, CKNDX, TROIQ in the last 72 hours.     No lab exists for component: CPKMB  Lab Results   Component Value Date/Time    Cholesterol, total 148 09/23/2020 04:27 AM    HDL Cholesterol 52 09/23/2020 04:27 AM    LDL, calculated 83.8 09/23/2020 04:27 AM    Triglyceride 61 09/23/2020 04:27 AM    CHOL/HDL Ratio 2.8 09/23/2020 04:27 AM     Lab Results   Component Value Date/Time    Glucose (POC) 146 (H) 10/02/2020 10:28 AM    Glucose (POC) 181 (H) 10/02/2020 10:04 AM    Glucose (POC) 122 (H) 09/24/2020 09:35 PM    Glucose (POC) 132 (H) 09/24/2020 04:20 PM    Glucose (POC) 118 (H) 05/11/2020 11:35 AM     Lab Results   Component Value Date/Time    Color YELLOW/STRAW 09/22/2020 06:12 PM    Appearance CLEAR 09/22/2020 06:12 PM    Specific gravity 1.009 09/22/2020 06:12 PM    pH (UA) 7.0 09/22/2020 06:12 PM    Protein 100 (A) 09/22/2020 06:12 PM    Glucose Negative 09/22/2020 06:12 PM    Ketone Negative 09/22/2020 06:12 PM    Bilirubin Negative 09/22/2020 06:12 PM    Urobilinogen 0.2 09/22/2020 06:12 PM    Nitrites Negative 09/22/2020 06:12 PM    Leukocyte Esterase TRACE (A) 09/22/2020 06:12 PM    Epithelial cells FEW 09/22/2020 06:12 PM    Bacteria Negative 09/22/2020 06:12 PM    WBC 0-4 09/22/2020 06:12 PM    RBC 0-5 09/22/2020 06:12 PM         Medications Reviewed:     Current Facility-Administered Medications   Medication Dose Route Frequency    enoxaparin (LOVENOX) injection 40 mg  40 mg SubCUTAneous DAILY    hydrALAZINE (APRESOLINE) tablet 37.5 mg  37.5 mg Oral TID    hydrOXYzine HCL (ATARAX) tablet 25 mg  25 mg Oral TID PRN    HYDROcodone-acetaminophen (NORCO) 7.5-325 mg per tablet 1 Tab  1 Tab Oral Q4H PRN    carvediloL (COREG) tablet 3.125 mg  3.125 mg Oral BID WITH MEALS    docusate sodium (COLACE) capsule 100 mg  100 mg Oral PRN    aspirin delayed-release tablet 81 mg  81 mg Oral DAILY    sodium chloride (NS) flush 5-40 mL  5-40 mL IntraVENous Q8H    sodium chloride (NS) flush 5-40 mL  5-40 mL IntraVENous PRN    acetaminophen (TYLENOL) tablet 650 mg  650 mg Oral Q6H PRN    Or    acetaminophen (TYLENOL) suppository 650 mg  650 mg Rectal Q6H PRN    polyethylene glycol (MIRALAX) packet 17 g  17 g Oral DAILY PRN    promethazine (PHENERGAN) tablet 12.5 mg  12.5 mg Oral Q6H PRN    Or    ondansetron (ZOFRAN) injection 4 mg  4 mg IntraVENous Q6H PRN    carbidopa-levodopa (SINEMET)  mg per tablet 2 Tab  2 Tab Oral QID    atorvastatin (LIPITOR) tablet 40 mg  40 mg Oral QHS    clopidogreL (PLAVIX) tablet 75 mg  75 mg Oral DAILY AFTER BREAKFAST    DULoxetine (CYMBALTA) capsule 120 mg  120 mg Oral DAILY    hydrALAZINE (APRESOLINE) 20 mg/mL injection 10 mg  10 mg IntraVENous Q6H PRN    famotidine (PEPCID) tablet 20 mg  20 mg Oral DAILY    amLODIPine (NORVASC) tablet 5 mg  5 mg Oral DAILY     ______________________________________________________________________  EXPECTED LENGTH OF STAY: 4d 2h  ACTUAL LENGTH OF STAY:          7                 Sebastian Moran MD

## 2020-10-02 NOTE — PROGRESS NOTES
Problem: Pressure Injury - Risk of  Goal: *Prevention of pressure injury  Description: Document Gregorio Scale and appropriate interventions in the flowsheet. Outcome: Progressing Towards Goal  Note: Pressure Injury Interventions:  Sensory Interventions: Assess changes in LOC, Float heels, Keep linens dry and wrinkle-free, Maintain/enhance activity level, Minimize linen layers, Pressure redistribution bed/mattress (bed type)    Moisture Interventions: Absorbent underpads, Minimize layers, Maintain skin hydration (lotion/cream)    Activity Interventions: Increase time out of bed, Pressure redistribution bed/mattress(bed type), PT/OT evaluation    Mobility Interventions: Assess need for specialty bed, Float heels, Pressure redistribution bed/mattress (bed type), PT/OT evaluation    Nutrition Interventions: Offer support with meals,snacks and hydration                     Problem: Patient Education: Go to Patient Education Activity  Goal: Patient/Family Education  Outcome: Progressing Towards Goal     Problem: Falls - Risk of  Goal: *Absence of Falls  Description: Document Maylin Fall Risk and appropriate interventions in the flowsheet.   Outcome: Progressing Towards Goal  Note: Fall Risk Interventions:  Mobility Interventions: Communicate number of staff needed for ambulation/transfer, OT consult for ADLs, Patient to call before getting OOB, PT Consult for mobility concerns, PT Consult for assist device competence, Utilize walker, cane, or other assistive device         Medication Interventions: Evaluate medications/consider consulting pharmacy, Patient to call before getting OOB, Teach patient to arise slowly    Elimination Interventions: Call light in reach, Patient to call for help with toileting needs, Toileting schedule/hourly rounds    History of Falls Interventions: Door open when patient unattended, Consult care management for discharge planning, Room close to nurse's station         Problem: Patient Education: Go to Patient Education Activity  Goal: Patient/Family Education  Outcome: Progressing Towards Goal     Problem: Pain  Goal: *Control of Pain  Outcome: Progressing Towards Goal  Goal: *PALLIATIVE CARE:  Alleviation of Pain  Outcome: Progressing Towards Goal     Problem: Patient Education: Go to Patient Education Activity  Goal: Patient/Family Education  Outcome: Progressing Towards Goal     Problem: Patient Education: Go to Patient Education Activity  Goal: Patient/Family Education  Outcome: Progressing Towards Goal     Problem: Patient Education: Go to Patient Education Activity  Goal: Patient/Family Education  Outcome: Progressing Towards Goal     Problem: Discharge Planning  Goal: *Discharge to safe environment  Outcome: Progressing Towards Goal  Goal: *Knowledge of medication management  Outcome: Progressing Towards Goal  Goal: *Knowledge of discharge instructions  Outcome: Progressing Towards Goal     Problem: Patient Education: Go to Patient Education Activity  Goal: Patient/Family Education  Outcome: Progressing Towards Goal

## 2020-10-02 NOTE — CONSULTS
NEUROLOGY   INPATIENT EVALUATION/CONSULTATION       PATIENT NAME: De Lopez    MRN: [id number]    REASON FOR CONSULTATION: Episode of agitation, non-cooperativeness slow responsiveness    10/02/20      HISTORY OF PRESENT ILLNESS:  De Lopez is a 79 y.o. right-hand-dominant female pathic Parkinson's disease followed by [facility] neurology clinic admitted to [facility] on September 23, 2020 for worsening peripheral edema. Patient had been managed without particular incident up until the morning of October 2, 2020 when she was found to be altered. Alteration is not entirely clear but on review of event note she appeared difficult to arouse but did arouse. For the however she appeared somewhat agitated not cooperative and volitionally closing eyes. Stroke alert was activated with no significant findings on subsequent imaging with tele-neurology raising concern for her being \"frozen\" from her Parkinson's disease. Patient immediately returned to baseline after being left alone and subjectively says she was just trying to sleep. No vital sign derangements were noted at the time, no sedating medications given no significant lab abnormalities noted. Patient is reported to be at baseline per nursing who knows her from earlier in the week. Patient denies any history of seizures really does not think much happened.     PAST MEDICAL HISTORY:  Past Medical History:   Diagnosis Date    Anxiety disorder     [facility] [address]     CAD (coronary artery disease) 2007    stents, CABG x 3v    Carotid stenosis     Cervical stenosis of spinal canal 07/2019    Chronic kidney disease     Cough     CVA (cerebral vascular accident) ([facility] [address] 07/2019    left lacunar infarct at head of caudate    Depression     AND CHRONIC ANXIETY    Diabetes (HCC)     GERD (gastroesophageal reflux disease)     High cholesterol     History of peptic ulcer     Bleeding ulcer with increased NSAID use  Hypertension     Left carotid stenosis 2019    s/p left CEA with Dr. Lazarus Lorenzo, old 2007    PUD (peptic ulcer disease)     Stroke [facility] [address]     Tremor     Valvular heart disease        PAST SURGICAL HISTORY:  Past Surgical History:   Procedure Laterality Date    CARDIAC SURG PROCEDURE UNLIST      CABG X3 VESSEL    HX CAROTID ENDARTERECTOMY  2019    HX CORONARY ARTERY BYPASS GRAFT      HX TONSILLECTOMY  1963    TOTAL KNEE ARTHROPLASTY Right 2015       FAMILY HISTORY:   Family History   Problem Relation Age of Onset    Heart Attack Mother 72        Dec 91yo    Hypertension Mother     Other Father         Unknown    Parkinson's Disease Brother     Anesth Problems Neg Hx          SOCIAL HISTORY:  Social History     Socioeconomic History    Marital status: SINGLE     Spouse name: Not on file    Number of children: Not on file    Years of education: Not on file    Highest education level: Not on file   Occupational History    Occupation: Retired realestate/teacher   Tobacco Use    Smoking status: Former Smoker     Packs/day: 0.25     Years: 5.00     Pack years: 1.25     Types: Cigarettes     Last attempt to quit:      Years since quittin.7    Smokeless tobacco: Never Used   Substance and Sexual Activity    Alcohol use:  Yes     Alcohol/week: 0.0 standard drinks     Comment: Rare    Drug use: No   Social History Narrative    L[facility] in [facility]         MEDICATIONS:   Current Facility-Administered Medications   Medication Dose Route Frequency Provider Last Rate Last Dose    enoxaparin (LOVENOX) injection 40 mg  40 mg SubCUTAneous DAILY Tonya Cedeño MD   40 mg at 10/02/20 0947    hydrALAZINE (APRESOLINE) tablet 37.5 mg  37.5 mg Oral TID Ti WINSTON PA-C   37.5 mg at 10/02/20 1050    hydrOXYzine HCL (ATARAX) tablet 25 mg  25 mg Oral TID PRN Callum Sahni MD   25 mg at 20    HYDROcodone-acetaminophen (NORCO) 7.5-325 mg per tablet 1 Tab  1 Tab Oral Q4H PRN Shikha Huff MD   1 Tab at 09/30/20 0942    carvediloL (COREG) tablet 3.125 mg  3.125 mg Oral BID WITH MEALS Tarik Coombs MD   3.125 mg at 10/02/20 0730    docusate sodium (COLACE) capsule 100 mg  100 mg Oral PRN Violette Joshua MD        aspirin delayed-release tablet 81 mg  81 mg Oral DAILY Tarik Coombs MD   81 mg at 10/02/20 1049    sodium chloride (NS) flush 5-40 mL  5-40 mL IntraVENous Q8H Violette Joshua MD   10 mL at 10/02/20 0732    sodium chloride (NS) flush 5-40 mL  5-40 mL IntraVENous PRN Tarik Coombs MD        acetaminophen (TYLENOL) tablet 650 mg  650 mg Oral Q6H PRN Violette Joshua MD   650 mg at 09/24/20 7500    Or    acetaminophen (TYLENOL) suppository 650 mg  650 mg Rectal Q6H PRN Tarik Coombs MD        polyethylene glycol (MIRALAX) packet 17 g  17 g Oral DAILY PRN Tarik Coombs MD        promethazine (PHENERGAN) tablet 12.5 mg  12.5 mg Oral Q6H PRN Tarik Coombs MD        Or    ondansetron (ZOFRAN) injection 4 mg  4 mg IntraVENous Q6H PRN Tarik Coombs MD        carbidopa-levodopa (SINEMET)  mg per tablet 2 Tab  2 Tab Oral QID Violette Joshua MD   2 Tab at 10/02/20 1353    atorvastatin (LIPITOR) tablet 40 mg  40 mg Oral QHS Violette Joshua MD   40 mg at 10/02/20 1049    clopidogreL (PLAVIX) tablet 75 mg  75 mg Oral DAILY AFTER Fam Chris MD   75 mg at 10/02/20 1050    DULoxetine (CYMBALTA) capsule 120 mg  120 mg Oral DAILY Violette Joshua MD   120 mg at 10/02/20 1050    hydrALAZINE (APRESOLINE) 20 mg/mL injection 10 mg  10 mg IntraVENous Q6H PRN Tarik Coombsa, MD   10 mg at 10/02/20 0730    famotidine (PEPCID) tablet 20 mg  20 mg Oral DAILY Violette Joshua MD   20 mg at 10/02/20 1050    amLODIPine (NORVASC) tablet 5 mg  5 mg Oral DAILY Suad WINSTON PA-C   5 mg at 10/02/20 1049         ALLERGIES:  No Known Allergies      REVIEW OF SYSTEMS:  Pertinent positive/negatives as above otherwise 14 point of systems is negative. PHYSICAL EXAM:  Vital Signs:   Visit Vitals  BP (!) 173/72 (BP 1 Location: Right arm, BP Patient Position: At rest)   Pulse (!) 57   Temp 98.7 °F (37.1 °C)   Resp 17   Ht 5' 5\" (1.651 m)   Wt 73.1 kg (161 lb 3.2 oz)   SpO2 96%   BMI 26.83 kg/m²     Physical examination:  Particular appearing female sitting in bed watching TV. HEENT appears overall unremarkable. Neck appears supple. Cardiovascular demonstrates constant S1/S2 bilaterally. Pulmonary demonstrates a culture bilaterally. Abdomen is nondistended and nontender. Extremities are warm/dry. Neurologically patient appears alert and oriented to self situation and location. Orientation to day month year not assessed. Attention appears intact. Speech appears clear, language fluent. Cranials 2 through 12 are grossly intact with slowing of saccades noted. Motorically  she has grossly 5 out of 5 strength in upper and lower extremities with subtle high amplitude low hertz tremor noted in right hand as well as cogwheel rigidity at the right wrist.  Lower extremity strength is 4- out of 5 at iliopsoas not clearly completely independent of effort. Hip abduction abduction is 4+ out of 5 knee flexion is 5 out of 5. Complains of pain with dorsi and plantar flexion. Sensation is grossly intact to fine touch and temperature without clear length dependent gradient. Reflexes are 2+ in upper extremities trace at knees absent Achilles. Babinski appears equivocal on right chronically present on the left. Remainder of examination is deferred. PERTINENT DATA:      CT Results (maximum last 3): Results from [place] encounter on 09/22/20   CT CODE NEURO HEAD WO CONTRAST    Narrative EXAM: CT CODE NEURO HEAD WO CONTRAST    INDICATION: ams    COMPARISON: May 2, 2020. CONTRAST: None. TECHNIQUE: Unenhanced CT of the head was performed using 5 mm images. Brain and  bone windows were generated. Coronal and sagittal reformats. CT dose reduction  was achieved through use of a standardized protocol tailored for this  examination and automatic exposure control for dose modulation. FINDINGS:  The ventricles and sulci are enlarged. There is periventricular hypoattenuation  compatible with chronic small vessel ischemic changes. There is old ischemia in  the basal ganglia. There is no intracranial hemorrhage, extra-axial collection,  or mass effect. The basilar cisterns are open. No CT evidence of acute infarct. The bone windows demonstrate no abnormalities. There is mucosal thickening of  the right maxillary sinus and left frontal sinus. Nonspecific calcified masses  in the scalp. Impression IMPRESSION:   No acute intracranial findings. Sinus disease. Results from [place] encounter on 04/30/20   CT PELV WO CONT    Narrative CT PELVIS  WITHOUT CONTRAST. 5/2/2020 12:21 PM     INDICATION: Fall. COMPARISON: 6/3/2014. TECHNIQUE: CT of the pelvis was performed without contrast. CT dose reduction  was achieved through use of a standardized protocol tailored for this  examination and automatic exposure control for dose modulation. Adaptive  statistical iterative reconstruction (ASIR) was utilized. FINDINGS:  No displaced fracture. The patient is osteopenic. A 42 mm AAA is partially imaged. Presacral edema is associated with a large  stool ball impacted in the rectum. This has mass effect on the uterus and  bladder. Impression IMPRESSION:  1. No displaced fracture. 2. Rectal fecal impaction. 3. AAA. CT HEAD WO CONT    Narrative EXAM:  CT HEAD WO CONT  INDICATION:   Weakness, fall  Additional history:  COMPARISON: CT of the head, 7/12/2019. CTA of the head, 2/24/2020  . TECHNIQUE:   Unenhanced CT of the head was performed using 5 mm images. Coronal and sagittal  reformats were produced. Brain and bone windows were generated.    CT dose reduction was achieved through use of a standardized protocol tailored  for this examination and automatic exposure control for dose modulation. Henrik Chadwick FINDINGS:  Global atrophy. Periventricular white matter hypoattenuation. Remote lacunar  infarct in the right basal ganglia. There is no intracranial hemorrhage,  extra-axial collection, mass, mass effect or midline shift. The basilar  cisterns are open. No acute infarct is identified. Intracranial atherosclerosis  The bone windows demonstrate no abnormalities. Mucosal thickening in the right  maxillary sinus. Partially calcified terminal lesions throughout the scalp. .    Impression IMPRESSION:   1. No evidence of acute intracranial abnormality by this modality. MRI Results (maximum last 3):     ASSESSMENT:      Ora Herrera is a 29-year-old right-hand-dominant female with idiopathic Parkinson's disease followed by Dr. Linda Moyer September 23, 2020 worsening peripheral edema who suffered an event of uncooperativeness/altered sensorium this morning    RECOMMENDATIONS:  Paroxysmal event:  On discussion with bedside nurse, review of event documentation it is not clear that anything more than a behavioral event occurred  Disagree with tele-neurology's thought that she was suffering freezing from Parkinson's given that she was given no medications to reverse her freezing and thus would not be expected to improve spontaneously in such dramatic fashion  Per patient's recollection she was trying to sleep and did not want to be disturbed on review of the event documentation there appears to be certainly volitional component to the events  No particular concern for stroke, recommend ongoing clinical monitoring for recurrent events at which point they can be further evaluated    Please call with questions concerns      Moira Collet, MD

## 2020-10-02 NOTE — PROGRESS NOTES
responded to RRT and code stroke. Pt was going to CT. Please contact [phone] [facility] for further support.  follow up as needed.      [address] MARCI LongoriaDiv, MACE   [phone])

## 2020-10-02 NOTE — PROGRESS NOTES
Problem: Mobility Impaired (Adult and Pediatric)  Goal: *Acute Goals and Plan of Care (Insert Text)  Description: FUNCTIONAL STATUS PRIOR TO ADMISSION: Patient was modified independent using a rolling walker for functional mobility. HOME SUPPORT PRIOR TO ADMISSION: The patient lived alone with caregiver assistance 4 hours x1 day/ wk to provide assistance for shopping, household tasks. Physical Therapy Goals  Reassessed 10/2/2020  1. Patient will move from supine to sit and sit to supine , scoot up and down, and roll side to side in bed with CGA within 7 day(s). 2.  Patient will transfer from bed to chair and chair to bed with CGA using the least restrictive device within 7 day(s). 3.  Patient will perform sit to stand with CGA within 7 day(s). 4.  Patient will ambulate with CGA for 50 feet with the least restrictive device within 7 day(s). Initiated 9/23/2020  1. Patient will move from supine to sit and sit to supine , scoot up and down, and roll side to side in bed with modified independence within 7 day(s). 2.  Patient will transfer from bed to chair and chair to bed with modified independence using the least restrictive device within 7 day(s). 3.  Patient will perform sit to stand with modified independence within 7 day(s). 4.  Patient will ambulate with modified independence for 100 feet with the least restrictive device within 7 day(s). Outcome: Progressing Towards Goal   PHYSICAL THERAPY TREATMENT: WEEKLY REASSESSMENT  Patient: Víctor Jonhson (89 y.o. female)  Date: 10/2/2020  Primary Diagnosis: CHF (congestive heart failure) ([facility] [address] [I50.9]  CHF exacerbation ([facility] [address] [I50.9]        Precautions:   Fall      ASSESSMENT  Patient continues with skilled PT services and is slowly progressing towards goals. Pt received supine in bed and agreeable to therapy with some encouragement.  Pt tolerated session fairly well, but continues to be limited by generalized weakness, decreased functional mobility, impaired balance and gait. Pt requires increased time and effort to complete all mobility tasks. Pt completed supine to sit EOB with mod A x 2 and max verbal cues for technique. Pt performed sit<>stand from the bed and BSC with RW with min A x 2 and noted increased posterior trunk lean initially but able to correct with verbal/tactile cues. Pt tolerated short gait distance with RW with min A x 2 demonstrating short, shuffle steps, wide BLADE. Pt remained seated in the chair with all needs met. Patient's progression toward goals since last assessment: no goals were met. Pt with multiple refusals during the week limiting progress with acute therapies    Current Level of Function Impacting Discharge (mobility/balance): mod A x 2 supine to sit, min A x 2 sit<>stand short gait training with RW           PLAN :  Goals have been updated based on progression since last assessment. Patient continues to benefit from skilled intervention to address the above impairments. Recommendations and Planned Interventions: bed mobility training, transfer training, gait training, therapeutic exercises, neuromuscular re-education, patient and family training/education, and therapeutic activities      Frequency/Duration: Patient will be followed by physical therapy:  5 times a week to address goals. Recommendation for discharge: (in order for the patient to meet his/her long term goals)  Therapy up to 5 days/week in SNF setting    This discharge recommendation:  Has been made in collaboration with the attending provider and/or case management    IF patient discharges home will need the following DME: to be determined (TBD)         SUBJECTIVE:   Patient stated I guess I can try.     OBJECTIVE DATA SUMMARY:   HISTORY:    Past Medical History:   Diagnosis Date    Anxiety disorder     Atrial fibrillation ([facility] [address]     CAD (coronary artery disease) 2007    stents, CABG x 3v    Carotid stenosis     Cervical stenosis of spinal canal 07/2019    Chronic kidney disease     Cough     CVA (cerebral vascular accident) ([facility] [address] 07/2019    left lacunar infarct at head of caudate    Depression     AND CHRONIC ANXIETY    Diabetes (HCC)     GERD (gastroesophageal reflux disease)     High cholesterol     History of peptic ulcer     Bleeding ulcer with increased NSAID use    Hypertension     Left carotid stenosis 07/2019    s/p left CEA with Dr. Wayne Falcon, old 2007    PUD (peptic ulcer disease)     Stroke ([facility] [address]     Tremor     Valvular heart disease      Past Surgical History:   Procedure Laterality Date    CARDIAC SURG PROCEDURE UNLIST      CABG X3 VESSEL    HX CAROTID ENDARTERECTOMY  07/2019    HX CORONARY ARTERY BYPASS GRAFT      HX TONSILLECTOMY  1963    TOTAL KNEE ARTHROPLASTY Right 2015       Personal factors and/or comorbidities impacting plan of care:     Home Situation  Home Environment: Apartment  # Steps to Enter: 0  One/Two Story Residence: One story  Living Alone: Yes  Support Systems: Family member(s)  Patient Expects to be Discharged to[de-identified] Apartment  Current DME Used/Available at Home: Walker    EXAMINATION/PRESENTATION/DECISION MAKING:   Critical Behavior:  Neurologic State: Alert  Orientation Level: Oriented X4  Cognition: Follows commands  Safety/Judgement: Awareness of environment  Hearing: Auditory  Auditory Impairment: Hard of hearing, bilateral  Skin:    Edema:   Range Of Motion:  AROM: Generally decreased, functional                       Strength:    Strength: Generally decreased, functional                    Tone & Sensation:                                  Coordination:  Coordination: (tremors noted)  Vision:      Functional Mobility:  Bed Mobility:     Supine to Sit: Moderate assistance;Assist x2; Additional time     Scooting:  Moderate assistance  Transfers:  Sit to Stand: Minimum assistance;Assist x2  Stand to Sit: Minimum assistance;Assist x2        Bed to Chair: Minimum assistance;Assist x2 Balance:   Sitting: Intact  Standing: Impaired; With support  Standing - Static: Constant support; Fair  Standing - Dynamic : Constant support; Fair  Ambulation/Gait Training:  Distance (ft): 4 Feet (ft)  Assistive Device: Gait belt;Walker, rolling  Ambulation - Level of Assistance: Minimal assistance;Assist x2        Gait Abnormalities: Decreased step clearance;Shuffling gait        Base of Support: Widened     Speed/Samira: Pace decreased (<100 feet/min); Shuffled  Step Length: Right shortened;Left shortened           Pain Rating:  Pt denied pain    Activity Tolerance:   Fair  Please refer to the flowsheet for vital signs taken during this treatment. After treatment patient left in no apparent distress:   Sitting in chair, Call bell within reach, and Side rails x 3    COMMUNICATION/EDUCATION:   The patients plan of care was discussed with: Registered nurse. Fall prevention education was provided and the patient/caregiver indicated understanding., Patient/family have participated as able in goal setting and plan of care. , and Patient/family agree to work toward stated goals and plan of care.     Thank you for this referral.  Lucinda Brooks, PT, DPT   Time Calculation: 24 mins

## 2020-10-02 NOTE — PROGRESS NOTES
Occupational Therapy: Patient received in bed and asleep. Aroused briefly off and on, but declined participation by stating \"no\" times 2, falling back asleep. Spoke with nurse who entered to give meds and states patient now asleep and unable to arouse. Entered with nurse. Patient not responding. Nursing calling a Rapid Response.    GILBERT Eisenberg

## 2020-10-02 NOTE — PROGRESS NOTES
CODE S    [facility] Charge responded to Code S in room 609. Rapid response called just prior. Per primary RN, patient was experiencing AMS. LKW was approximately 0730. MD and Neuro IR NP, Randall Conn, at bedside. Patient was initially responding to painful stimuli, then became more responsive throughout the assessment. NIH difficult to complete as patient is seemingly uncooperative. Patient then became alert and oriented to person and time, follows commands. Patient kept forcefully closing eyes, making it difficult to assess pupils. Generalized weakness noted. Blood sugar WDL. [facility] RN called teleneurology and gave Dr Soraida Lucio contact information to return call. Patient transported to CT for imaging on telemetry with primary RN.

## 2020-10-02 NOTE — ADVANCED PRACTICE NURSE
Neurocritical Care Code Stroke Documentation    Symptoms:   AMS, Lethargy    Last Known Well: 0730 AM   Medical hx: Active Problems:    CHF (congestive heart failure) ([facility] [address] (2020)      CHF exacerbation ([facility] [address] (2020)       Anticoagulation: Aspirin/Plavix/Lovenox   VAN:  Negative   NIHSS:   1a-LOC: 1    1b-Month/Age: 0    1c-Open/Close Hand:1    2-Best Gaze: unable to assess ( patient will not open eyes)    3-Visual Fields: Unable to assess     4-Facial Palsy: 0    5a-Left Arm:0    5b-Right Arm:0    6a-Left Le    6b-Right Le    7-Limb Ataxia:0    8-Sensory:0    9-Best Language:0    10-Dysarthria:0    11-Extinction/Inattention:0  TOTAL SCORE:2   Imaging:   CT    CTA    CTP   Plan:   TPA Candidate: NO    Mechanical thrombectomy Candidate: NO     Discussed with: Dr. Carlita Kunz and stroke response team. Patient is agitated and altered , no neurological focal deficits. She is adamantly refusing to participate with most of th neurological exam. Recommend head CT only and teleneurology consult. Chart reviewed, no recent contributory medications seen. Patient was last seen normal at shift change around 730 AM.    Time spent: 15 minutes.      Lazarus Gentleman, NP  Neurocritical Care Nurse Practitioner  [phone]

## 2020-10-02 NOTE — PROGRESS NOTES
Physical Therapy  Code stroke called on patient. Patient currently off the floor. Will hold for now and follow up once deemed medically stable.   Radha Tamayo, PT

## 2020-10-03 PROCEDURE — 65270000029 HC RM PRIVATE

## 2020-10-03 PROCEDURE — 74011250637 HC RX REV CODE- 250/637: Performed by: INTERNAL MEDICINE

## 2020-10-03 PROCEDURE — 74011250637 HC RX REV CODE- 250/637: Performed by: HOSPITALIST

## 2020-10-03 PROCEDURE — 74011250636 HC RX REV CODE- 250/636: Performed by: INTERNAL MEDICINE

## 2020-10-03 PROCEDURE — 94760 N-INVAS EAR/PLS OXIMETRY 1: CPT

## 2020-10-03 PROCEDURE — 74011250637 HC RX REV CODE- 250/637: Performed by: PHYSICIAN ASSISTANT

## 2020-10-03 RX ADMIN — HYDRALAZINE HYDROCHLORIDE 37.5 MG: 25 TABLET, FILM COATED ORAL at 22:27

## 2020-10-03 RX ADMIN — ENOXAPARIN SODIUM 40 MG: 40 INJECTION SUBCUTANEOUS at 09:29

## 2020-10-03 RX ADMIN — CARVEDILOL 3.12 MG: 3.12 TABLET, FILM COATED ORAL at 16:39

## 2020-10-03 RX ADMIN — Medication 10 ML: at 22:28

## 2020-10-03 RX ADMIN — CARVEDILOL 3.12 MG: 3.12 TABLET, FILM COATED ORAL at 06:45

## 2020-10-03 RX ADMIN — HYDRALAZINE HYDROCHLORIDE 37.5 MG: 25 TABLET, FILM COATED ORAL at 16:39

## 2020-10-03 RX ADMIN — DULOXETINE 120 MG: 60 CAPSULE, DELAYED RELEASE ORAL at 09:28

## 2020-10-03 RX ADMIN — AMLODIPINE BESYLATE 5 MG: 5 TABLET ORAL at 09:28

## 2020-10-03 RX ADMIN — CARBIDOPA AND LEVODOPA 2 TABLET: 25; 100 TABLET ORAL at 18:19

## 2020-10-03 RX ADMIN — CARBIDOPA AND LEVODOPA 2 TABLET: 25; 100 TABLET ORAL at 12:55

## 2020-10-03 RX ADMIN — HYDROCODONE BITARTRATE AND ACETAMINOPHEN 1 TABLET: 7.5; 325 TABLET ORAL at 15:00

## 2020-10-03 RX ADMIN — CARBIDOPA AND LEVODOPA 2 TABLET: 25; 100 TABLET ORAL at 22:27

## 2020-10-03 RX ADMIN — FAMOTIDINE 20 MG: 20 TABLET ORAL at 09:28

## 2020-10-03 RX ADMIN — ATORVASTATIN CALCIUM 40 MG: 40 TABLET, FILM COATED ORAL at 09:28

## 2020-10-03 RX ADMIN — HYDRALAZINE HYDROCHLORIDE 37.5 MG: 25 TABLET, FILM COATED ORAL at 09:29

## 2020-10-03 RX ADMIN — Medication 10 ML: at 14:54

## 2020-10-03 RX ADMIN — CLOPIDOGREL BISULFATE 75 MG: 75 TABLET ORAL at 09:28

## 2020-10-03 RX ADMIN — CARBIDOPA AND LEVODOPA 2 TABLET: 25; 100 TABLET ORAL at 09:28

## 2020-10-03 RX ADMIN — ASPIRIN 81 MG: 81 TABLET, COATED ORAL at 09:28

## 2020-10-03 NOTE — PROGRESS NOTES
TRANSITION OF CARE  RUR--24%  Disposition--To [facility]. Insurance authorization pending. Transport--Family versus BLS stretcher    ELIZABETH spoke with staff nurse at [facility] who said that there was no staff available at this time to check on the status of insurance authorization. Per staff person, SIXTO Quijano of [facility] marketing,  will be in tomorrow [place] 10/4/20 after 10:00AM and should be able to determine if insurance authorization has been received.

## 2020-10-03 NOTE — PROGRESS NOTES
Hospitalist Progress Note  Renetta Masterson MD  Answering service: [phone] from in house phone        Date of Service:  10/3/2020  NAME:  Kendal Lynn  :  1940  MRN:  [id number]      Admission Summary:   As per initial admission summary  Hernan Driver is a 79 y.o.  female who has a history of hypertension, CVA, carotid stenosis presents with lower extremity swelling and generalized weakness. She also reports that she was unable to walk due to swelling in the legs. She reports this was insidious in onset and gradually progressing. She denies any falls. She reports that she lives alone at home and is unable to take care of regular activities of daily living. She presents to the emergency room and noted to have swelling in the lower extremities and referred for admission. Currently patient denies chest pain shortness breath nausea vomiting or diarrhea.       We were asked to admit for work up and evaluation of the above problems        Interval history / Subjective:   Patient seen and examined    States that she is doing OK,has some foot pain. Discussed with  to check if rehab received insurance auth     Assessment & Plan:     # Transient altered mental status:resolved  -ABG,blood glucose wnl. No acute abnormalities on CT head. -reviewed neurology note- no further testing or med changes required    Lower extremity edema -resolved  Acute on chronic diastolic heart failure -resolved  - Duplex lower extremities negative for DVT. - received IV lasix. - Cardiology following--  Recommend she be discharge on Lasix 20 mg PO daily   -Cr on - 1.43 which is improved, renal function stable.   She is now euvolemic  -prn lasix for now     # Macular rash lower extremities-- resolved   - Possible cellulitis of lower extremities  - Completed IVF Ancef  - Duplex negative for DVT     # Hypertension :  - ct with Coreg,hydralazine     # Generalized weakness  - With underlying history of Parkinson's  - PT/OT evaluation-need rehab at discharge     # Parkinson disease  - Continue Sinemet     # History of coronary disease  - Continue with aspirin Plavix     # CKD stage II   - monitor and avoid nephrotoxins     # Chronic A. Fib  - Currently normal sinus rhythm. - Not on anticoagulation per determination on previous admissions possibly secondary to risk of falls     # History of chronic carotid artery stenosis  Continue with Plavix     # Hyperlipidemia  Continue statin     # Anemia of chronic disease  Monitor hemoglobin    Suspected peripheral neuropathy:  -already on cymbalta    Code status: Full code   DVT prophylaxis: [address] discussed with: Patient/Family  Disposition: SNF     Hospital Problems  Date Reviewed: 5/1/2020          Codes Class Noted POA    CHF exacerbation ([facility] ICD-10-CM: I50.9  ICD-9-CM: 428.0  9/25/2020 Unknown        CHF (congestive heart failure) ([facility] ICD-10-CM: I50.9  ICD-9-CM: 428.0  9/22/2020 Unknown                Review of Systems:   A comprehensive review of systems was negative except for that written in the HPI. Vital Signs:    Last 24hrs VS reviewed since prior progress note. Most recent are:  Visit Vitals  BP (!) 167/63 (BP 1 Location: Right arm, BP Patient Position: At rest)   Pulse 63   Temp 98.8 °F (37.1 °C)   Resp 16   Ht 5' 5\" (1.651 m)   Wt 74.3 kg (163 lb 11.2 oz)   SpO2 96%   BMI 27.24 kg/m²       No intake or output data in the 24 hours ending 10/03/20 1605     Physical Examination:             Constitutional:  elderly patient    ENT:  Oral mucous moist, Neck supple,    Resp:  CTA bilaterally. No wheezing/rhonchi/rales. No accessory muscle use   CV:  Regular rhythm, normal rate    GI:  Soft, non distended, non tender, normoactive bowel sounds    Musculoskeletal:  no LE edema    Neurologic:  Moves all extremities,alert,awake,oriented X 3.      Psy: calm Data Review:   I personally reviewed labs and imaging results      Labs:     Recent Labs     10/02/20  0516   WBC 5.2   HGB 10.4*   HCT 32.5*        Recent Labs     10/02/20  0516      K 4.0   *   CO2 26   BUN 36*   CREA 1.34*   *   CA 9.7   PHOS 3.8     Recent Labs     10/02/20  0516   ALB 3.0*     No results for input(s): INR, PTP, APTT, INREXT, INREXT in the last 72 hours. No results for input(s): FE, TIBC, PSAT, FERR in the last 72 hours. No results found for: FOL, RBCF   No results for input(s): PH, PCO2, PO2 in the last 72 hours. No results for input(s): CPK, CKNDX, TROIQ in the last 72 hours.     No lab exists for component: CPKMB  Lab Results   Component Value Date/Time    Cholesterol, total 148 09/23/2020 04:27 AM    HDL Cholesterol 52 09/23/2020 04:27 AM    LDL, calculated 83.8 09/23/2020 04:27 AM    Triglyceride 61 09/23/2020 04:27 AM    CHOL/HDL Ratio 2.8 09/23/2020 04:27 AM     Lab Results   Component Value Date/Time    Glucose (POC) 146 (H) 10/02/2020 10:28 AM    Glucose (POC) 181 (H) 10/02/2020 10:04 AM    Glucose (POC) 122 (H) 09/24/2020 09:35 PM    Glucose (POC) 132 (H) 09/24/2020 04:20 PM    Glucose (POC) 118 (H) 05/11/2020 11:35 AM     Lab Results   Component Value Date/Time    Color YELLOW/STRAW 09/22/2020 06:12 PM    Appearance CLEAR 09/22/2020 06:12 PM    Specific gravity 1.009 09/22/2020 06:12 PM    pH (UA) 7.0 09/22/2020 06:12 PM    Protein 100 (A) 09/22/2020 06:12 PM    Glucose Negative 09/22/2020 06:12 PM    Ketone Negative 09/22/2020 06:12 PM    Bilirubin Negative 09/22/2020 06:12 PM    Urobilinogen 0.2 09/22/2020 06:12 PM    Nitrites Negative 09/22/2020 06:12 PM    Leukocyte Esterase TRACE (A) 09/22/2020 06:12 PM    Epithelial cells FEW 09/22/2020 06:12 PM    Bacteria Negative 09/22/2020 06:12 PM    WBC 0-4 09/22/2020 06:12 PM    RBC 0-5 09/22/2020 06:12 PM         Medications Reviewed:     Current Facility-Administered Medications   Medication Dose Route Frequency    enoxaparin (LOVENOX) injection 40 mg  40 mg SubCUTAneous DAILY    hydrALAZINE (APRESOLINE) tablet 37.5 mg  37.5 mg Oral TID    hydrOXYzine HCL (ATARAX) tablet 25 mg  25 mg Oral TID PRN    HYDROcodone-acetaminophen (NORCO) 7.5-325 mg per tablet 1 Tab  1 Tab Oral Q4H PRN    carvediloL (COREG) tablet 3.125 mg  3.125 mg Oral BID WITH MEALS    docusate sodium (COLACE) capsule 100 mg  100 mg Oral PRN    aspirin delayed-release tablet 81 mg  81 mg Oral DAILY    sodium chloride (NS) flush 5-40 mL  5-40 mL IntraVENous Q8H    sodium chloride (NS) flush 5-40 mL  5-40 mL IntraVENous PRN    acetaminophen (TYLENOL) tablet 650 mg  650 mg Oral Q6H PRN    Or    acetaminophen (TYLENOL) suppository 650 mg  650 mg Rectal Q6H PRN    polyethylene glycol (MIRALAX) packet 17 g  17 g Oral DAILY PRN    promethazine (PHENERGAN) tablet 12.5 mg  12.5 mg Oral Q6H PRN    Or    ondansetron (ZOFRAN) injection 4 mg  4 mg IntraVENous Q6H PRN    carbidopa-levodopa (SINEMET)  mg per tablet 2 Tab  2 Tab Oral QID    atorvastatin (LIPITOR) tablet 40 mg  40 mg Oral QHS    clopidogreL (PLAVIX) tablet 75 mg  75 mg Oral DAILY AFTER BREAKFAST    DULoxetine (CYMBALTA) capsule 120 mg  120 mg Oral DAILY    hydrALAZINE (APRESOLINE) 20 mg/mL injection 10 mg  10 mg IntraVENous Q6H PRN    famotidine (PEPCID) tablet 20 mg  20 mg Oral DAILY    amLODIPine (NORVASC) tablet 5 mg  5 mg Oral DAILY     ______________________________________________________________________  EXPECTED LENGTH OF STAY: 4d 2h  ACTUAL LENGTH OF STAY:          8                 Gerry Davison MD

## 2020-10-04 VITALS
HEART RATE: 55 BPM | RESPIRATION RATE: 16 BRPM | SYSTOLIC BLOOD PRESSURE: 159 MMHG | DIASTOLIC BLOOD PRESSURE: 71 MMHG | TEMPERATURE: 97.8 F | HEIGHT: 65 IN | OXYGEN SATURATION: 100 % | WEIGHT: 164.5 LBS | BODY MASS INDEX: 27.41 KG/M2

## 2020-10-04 PROCEDURE — 74011250637 HC RX REV CODE- 250/637: Performed by: HOSPITALIST

## 2020-10-04 PROCEDURE — 74011250637 HC RX REV CODE- 250/637: Performed by: PHYSICIAN ASSISTANT

## 2020-10-04 PROCEDURE — 74011250636 HC RX REV CODE- 250/636: Performed by: INTERNAL MEDICINE

## 2020-10-04 RX ORDER — AMLODIPINE BESYLATE 5 MG/1
5 TABLET ORAL DAILY
Qty: 1 TAB | Refills: 0 | Status: SHIPPED
Start: 2020-10-05 | End: 2021-04-09

## 2020-10-04 RX ORDER — NALOXONE HYDROCHLORIDE 4 MG/.1ML
SPRAY NASAL
Qty: 1 EACH | Refills: 0 | Status: SHIPPED
Start: 2020-10-04 | End: 2021-04-23

## 2020-10-04 RX ORDER — HYDRALAZINE HYDROCHLORIDE 25 MG/1
37.5 TABLET, FILM COATED ORAL 3 TIMES DAILY
Qty: 1 TAB | Refills: 0 | Status: SHIPPED
Start: 2020-10-04 | End: 2021-07-16 | Stop reason: ALTCHOICE

## 2020-10-04 RX ORDER — HYDROCODONE BITARTRATE AND ACETAMINOPHEN 5; 325 MG/1; MG/1
1 TABLET ORAL
Qty: 3 TAB | Refills: 0 | Status: SHIPPED | OUTPATIENT
Start: 2020-10-04 | End: 2020-10-07

## 2020-10-04 RX ORDER — CARVEDILOL 3.12 MG/1
3.12 TABLET ORAL 2 TIMES DAILY WITH MEALS
Qty: 1 TAB | Refills: 0 | Status: SHIPPED
Start: 2020-10-04 | End: 2021-07-16 | Stop reason: SDUPTHER

## 2020-10-04 RX ORDER — FUROSEMIDE 20 MG/1
20 TABLET ORAL DAILY
Qty: 1 TAB | Refills: 0 | Status: SHIPPED
Start: 2020-10-04 | End: 2021-04-09

## 2020-10-04 RX ORDER — HYDROCODONE BITARTRATE AND ACETAMINOPHEN 7.5; 325 MG/1; MG/1
1 TABLET ORAL
Qty: 3 TAB | Refills: 0 | Status: SHIPPED | OUTPATIENT
Start: 2020-10-04 | End: 2020-10-04

## 2020-10-04 RX ADMIN — FAMOTIDINE 20 MG: 20 TABLET ORAL at 08:59

## 2020-10-04 RX ADMIN — CARVEDILOL 3.12 MG: 3.12 TABLET, FILM COATED ORAL at 08:59

## 2020-10-04 RX ADMIN — CLOPIDOGREL BISULFATE 75 MG: 75 TABLET ORAL at 08:59

## 2020-10-04 RX ADMIN — ASPIRIN 81 MG: 81 TABLET, COATED ORAL at 08:59

## 2020-10-04 RX ADMIN — DULOXETINE 120 MG: 60 CAPSULE, DELAYED RELEASE ORAL at 09:00

## 2020-10-04 RX ADMIN — AMLODIPINE BESYLATE 5 MG: 5 TABLET ORAL at 08:59

## 2020-10-04 RX ADMIN — HYDRALAZINE HYDROCHLORIDE 37.5 MG: 25 TABLET, FILM COATED ORAL at 09:00

## 2020-10-04 RX ADMIN — ENOXAPARIN SODIUM 40 MG: 40 INJECTION SUBCUTANEOUS at 09:02

## 2020-10-04 RX ADMIN — ATORVASTATIN CALCIUM 40 MG: 40 TABLET, FILM COATED ORAL at 08:59

## 2020-10-04 RX ADMIN — CARBIDOPA AND LEVODOPA 2 TABLET: 25; 100 TABLET ORAL at 09:00

## 2020-10-04 RX ADMIN — CARBIDOPA AND LEVODOPA 2 TABLET: 25; 100 TABLET ORAL at 12:44

## 2020-10-04 NOTE — PROGRESS NOTES
I have reviewed discharge instructions with the patient. Prescriptions in [facility] packet. The patient verbalized understanding. IV access removed. Patient discharged via [facility]. 1610: Called report in to JULIAN, [facility] at [employer]. Questions and concerns addressed.

## 2020-10-04 NOTE — PROGRESS NOTES
TRANSITION OF CARE  RUR--29%  Disposition--To [facility]  Transport--BLS stretcher with [facility]    CM follow up. CM spoke with Cecile Lozano of [facility], who confirmed that patient is accepted and that bed is available today . Nurse Report to be called to [phone]. CM faxed covid negative report of 10/1/20 to the facility. CM sent referral via [employer] to  [address] [facility] (Phone [phone]) for BLS transport, and referral was accepted  for a  3:30PM . CM completed PCS and placed it on chart. CM gave verbal update to staff nurse. Transition of Care Plan to SNF/Rehab    SNF/Rehab Transition:  Patient has been accepted to [facility] and meets criteria for admission. Patient will transported by BLS stretcher with [facility] and expected to leave at 3:30PM.    Communication to Patient/Family:  Met with patient she is agreeable to the transition plan. Communication to SNF/Rehab:  Bedside RN, Geeta Chacon, has been notified to update the transition plan to the facility and call report (phone number [phone]. Discharge information has been updated on the AVS.       Nursing Please include all hard scripts for controlled substances, med rec and dc summary, and AVS in packet.      Reviewed and confirmed with Olga Ruth, can manage the patient care needs for the following:     SNF/Rehab Transition:  Reviewed and confirmed with facility, they can manage the patient care needs for the following:     Earlis Angry with (X) only those applicable:    Medication:  [x]  Medications will be available at the facility  []  IV Antibiotics   []  Controlled Substance - hard copy to be sent with patient   []  Weekly Labs   Documents:  [] Hard RX  [x] MAR  [x] Kardex  [x] AVS  []Transfer Summary  []Discharge   Equipment:  []  CPAP/BiPAP  []  Wound Vacuum  []  Sue or Urinary Device  []  PICC/Central Line  []  Nebulizer  []  Ventilator   Treatment:  []Isolation (for MRSA, VRE, etc.)  []Surgical Drain Management  []Tracheostomy Care  []Dressing Changes  []Dialysis with transportation and chair time   []PEG Care  []Oxygen  []Daily Weights for Heart Failure   Dietary:  []Any diet limitations  []Tube Feedings   []Total Parenteral Management (TPN)   Eligible for Medicaid Long Term Services and Supports  Yes:  [] Eligible for medical assistance or will become eligible within 180 days and UAI completed. [] Provider/Patient and/or support system has requested screening. [] UAI copy provided to patient or responsible party,   [] UAI unavailable at discharge will send once processed to SNF provider. [] UAI unavailable at discharged mailed to patient  No:   [] Private pay and is not financially eligible for Medicaid within the next 180 days. [] Reside out-of-state.   [] A residents of a state owned/operated facility that is licensed  by [place] [address] or [facility]  [] Enrollment in [facility] hospice services  [] [address]  [x] Patient /Family declines to have screening completed or provide financial information for screening     Financial Resources:  Medicaid    [] Initiated and application pending   [] Full coverage     Advanced Care Plan:  []Surrogate Decision Maker of Care  []POA  []Communicated Code Status (DDNR\", \"Full\")    Other     Financial Resources:  Medicaid    [] Initiated and application pending   [] Full coverage     Advanced Care Plan:  []Surrogate Decision Maker of Care  []POA  []Communicated Code Status (DDNR\", \"Full\")    Other

## 2020-10-04 NOTE — PROGRESS NOTES
Problem: Falls - Risk of  Goal: *Absence of Falls  Description: Document Tracy Parekh Fall Risk and appropriate interventions in the flowsheet.   Outcome: Progressing Towards Goal  Note: Fall Risk Interventions:  Mobility Interventions: Communicate number of staff needed for ambulation/transfer, Patient to call before getting OOB         Medication Interventions: Evaluate medications/consider consulting pharmacy    Elimination Interventions: Call light in reach, Toileting schedule/hourly rounds, Patient to call for help with toileting needs    History of Falls Interventions: Evaluate medications/consider consulting pharmacy, Door open when patient unattended

## 2020-10-04 NOTE — PROGRESS NOTES
Problem: Pressure Injury - Risk of  Goal: *Prevention of pressure injury  Description: Document Gregorio Scale and appropriate interventions in the flowsheet. Outcome: Resolved/Met     Problem: Patient Education: Go to Patient Education Activity  Goal: Patient/Family Education  Outcome: Resolved/Met     Problem: Falls - Risk of  Goal: *Absence of Falls  Description: Document Maylin Fall Risk and appropriate interventions in the flowsheet.   Outcome: Resolved/Met     Problem: Patient Education: Go to Patient Education Activity  Goal: Patient/Family Education  Outcome: Resolved/Met     Problem: Pain  Goal: *Control of Pain  Outcome: Resolved/Met  Goal: *PALLIATIVE CARE:  Alleviation of Pain  Outcome: Resolved/Met     Problem: Patient Education: Go to Patient Education Activity  Goal: Patient/Family Education  Outcome: Resolved/Met     Problem: Patient Education: Go to Patient Education Activity  Goal: Patient/Family Education  Outcome: Resolved/Met     Problem: Patient Education: Go to Patient Education Activity  Goal: Patient/Family Education  Outcome: Resolved/Met     Problem: Discharge Planning  Goal: *Discharge to safe environment  Outcome: Resolved/Met  Goal: *Knowledge of medication management  Outcome: Resolved/Met  Goal: *Knowledge of discharge instructions  Outcome: Resolved/Met     Problem: Patient Education: Go to Patient Education Activity  Goal: Patient/Family Education  Outcome: Resolved/Met

## 2020-10-07 NOTE — DISCHARGE INSTRUCTIONS
Discharge SNF/Rehab Instructions/LTAC       PATIENT ID: Goran Sellers  MRN: [id number]   YOB: 1940    DATE OF ADMISSION: 9/22/2020  6:56 PM    DATE OF DISCHARGE: 10/4/2020    PRIMARY CARE PROVIDER: Will Saleh DO       ATTENDING PHYSICIAN: Mk Nicole MD  DISCHARGING PROVIDER: Parveen Kincaid MD     To contact this individual call [phone] and ask the  to page. If unavailable ask to be transferred the Adult Hospitalist Department. CONSULTATIONS: IP CONSULT TO CARDIOLOGY  IP CONSULT TO NEUROLOGY    PROCEDURES/SURGERIES: * No surgery found *    ADMITTING DIAGNOSES & HOSPITAL COURSE:     DISCHARGE DIAGNOSES / PLAN:    79 y.o.  female who has a history of hypertension, CVA, carotid stenosis presents with lower extremity swelling and generalized weakness.        Lower extremity edema -resolved  Acute on chronic diastolic heart failure -resolved  - Duplex lower extremities negative for DVT. - received IV lasix. - Cardiology following--  Recommend she be discharge on Lasix 20 mg PO daily   She is now euvolemic  -on 20 ng lasix - check BMP in 2 -3 days     # Macular rash lower extremities-- resolved   - Possible cellulitis of lower extremities  - Completed IVF Ancef  - Duplex negative for DVT     # Hypertension :  - ct with Coreg,hydralazine     # Generalized weakness  - With underlying history of Parkinson's  - PT/OT evaluation-need rehab at discharge     # Parkinson disease  - Continue Sinemet     # History of coronary disease  - Continue with aspirin Plavix     # CKD stage II   - monitor and avoid nephrotoxins,cr at discharge -1.3     # Paroxsymal A.  Fib  - Currently normal sinus rhythm.    - Not on anticoagulation per determination on previous admissions possibly secondary to risk of falls     # History of chronic carotid artery stenosis  Continue with Plavix     # Hyperlipidemia  Continue statin     # Anemia of chronic disease  Monitor hemoglobin     Suspected peripheral neuropathy:  -already on cymbalta    PENDING TEST RESULTS:   At the time of discharge the following test results are still pending: none    FOLLOW UP APPOINTMENTS:    Follow-up Information     Follow up With Specialties Details Why Contact Info    [address] [facility]  Referred for SNF rehab. [facility]  [address]    Michela Gomez, [address]   [address]  [place]       Samia Jones MD Neurology   [facility]  [phone]             ADDITIONAL CARE RECOMMENDATIONS:   Follow up with PCP,neurologist    DIET: Regular Diet      ACTIVITY: Activity as tolerated    WOUND CARE: na    EQUIPMENT needed: na      DISCHARGE MEDICATIONS:   See Medication Reconciliation Form      NOTIFY YOUR PHYSICIAN FOR ANY OF THE FOLLOWING:   Fever over 101 degrees for 24 hours. Chest pain, shortness of breath, fever, chills, nausea, vomiting, diarrhea, change in mentation, falling, weakness, bleeding. Severe pain or pain not relieved by medications. Or, any other signs or symptoms that you may have questions about. DISPOSITION:    Home With:   OT  PT  HH  RN      x SNF/Inpatient Rehab/LTAC    Independent/assisted living    Hospice    Other:       PATIENT CONDITION AT DISCHARGE:     Functional status    Poor    x Deconditioned     Independent      Cognition    x Lucid     Forgetful     Dementia      Catheters/lines (plus indication)    Sue     PICC     PEG    x None      Code status    x Full code     DNR      PHYSICAL EXAMINATION AT DISCHARGE:     Constitutional:  elderly patient    ENT:  Oral mucous moist, Neck supple,    Resp:  CTA bilaterally. No wheezing/rhonchi/rales.  No accessory muscle use   CV:  Regular rhythm, normal rate    GI:  Soft, non distended, non tender, normoactive bowel sounds    Musculoskeletal:  no LE edema    Neurologic:  Moves all extremities,alert,awake,oriented X 3. Psy: calm         CHRONIC MEDICAL DIAGNOSES:  Problem List as of 10/4/2020 Date Reviewed: 5/1/2020          Codes Class Noted - Resolved    CHF exacerbation ([place] [address].) ICD-10-CM: I50.9  ICD-9-CM: 428.0  9/25/2020 - Present        CHF (congestive heart failure) ([place] [address] ICD-10-CM: I50.9  ICD-9-CM: 428.0  9/22/2020 - Present        Weakness ICD-10-CM: R53.1  ICD-9-CM: 780.79  5/4/2020 - Present        Generalized weakness ICD-10-CM: R53.1  ICD-9-CM: 780.79  4/30/2020 - Present        Carotid artery stenosis, symptomatic, left ICD-10-CM: C05.97  ICD-9-CM: 433.10  7/26/2019 - Present        HTN (hypertension) ICD-10-CM: I10  ICD-9-CM: 401.9  7/17/2019 - Present        Acute ischemic stroke ([place] [address] ICD-10-CM: I63.9  ICD-9-CM: 434.91  7/12/2019 - Present        Fall ICD-10-CM: W19. Carlin Yonny  ICD-9-CM: E888.9  12/24/2018 - Present        CKD (chronic kidney disease), stage III ([place] [address] ICD-10-CM: N18.30  ICD-9-CM: 585.3  7/8/2015 - Present        Edema ICD-10-CM: R60.9  ICD-9-CM: 782.3  5/6/2015 - Present        Diabetes mellitus ([place] [address] ICD-10-CM: E11.9  ICD-9-CM: 250.00  5/6/2015 - Present        Postoperative anemia due to acute blood loss ICD-10-CM: D62  ICD-9-CM: 285.1  2/14/2015 - Present        S/P CABG (coronary artery bypass graft) ICD-10-CM: Z95.1  ICD-9-CM: V45.81  2/13/2015 - Present        Syncope ICD-10-CM: R55  ICD-9-CM: 780.2  2/9/2015 - Present        Bradycardia ICD-10-CM: R00.1  ICD-9-CM: 427.89  2/9/2015 - Present        MORENO (acute kidney injury) ([facility] ICD-10-CM: N17.9  ICD-9-CM: 584.9  2/9/2015 - Present        Anemia ICD-10-CM: D64.9  ICD-9-CM: 285.9  9/9/2014 - Present        GI bleed ICD-10-CM: K92.2  ICD-9-CM: 578.9  9/9/2014 - Present        AF (atrial fibrillation) (HCC) ICD-10-CM: I48.91  ICD-9-CM: 427.31  6/20/2014 - Present        Hypotension ICD-10-CM: I95.9  ICD-9-CM: 458.9  6/3/2014 - Present        CAD (coronary artery disease) ICD-10-CM: I25.10  ICD-9-CM: 414.00 6/3/2014 - Present    Overview Signed 2/10/2015 11:06 AM by Kimberly Moreno     2007 PCI x2 to LAD with STEPHAN             S/P coronary artery stent placement ICD-10-CM: Z95.5  ICD-9-CM: V45.82  6/3/2014 - Present        Renal failure ICD-10-CM: N19  ICD-9-CM: 293  6/3/2014 - Present        Elevated troponin ICD-10-CM: R77.8  ICD-9-CM: 790.6  6/3/2014 - Present        Pericardial effusion ICD-10-CM: I31.3  ICD-9-CM: 423.9  6/3/2014 - Present        Lactic acidosis ICD-10-CM: E87.2  ICD-9-CM: 276.2  6/3/2014 - Present                        Signed:   Terril Cranker, MD  10/4/2020  2:29 PM

## 2020-10-07 NOTE — DISCHARGE SUMMARY
Discharge Summary       PATIENT ID: Yeimi Chacon  MRN: [id number]   YOB: 1940    DATE OF ADMISSION: 9/22/2020  6:56 PM    DATE OF DISCHARGE:  10/4/2020   PRIMARY CARE PROVIDER: David Billings DO     ATTENDING PHYSICIAN: Esthela Fischer MD    DISCHARGING PROVIDER: Lori Joe MD    To contact this individual call [phone] and ask the  to page. If unavailable ask to be transferred the Adult Hospitalist Department. CONSULTATIONS: IP CONSULT TO CARDIOLOGY  IP CONSULT TO NEUROLOGY    PROCEDURES/SURGERIES: * No surgery found *    [address] COURSE:     DISCHARGE DIAGNOSES / PLAN:    Per H and P \"80 y.o.  female who has a history of hypertension, CVA, carotid stenosis presents with lower extremity swelling and generalized weakness.  She also reports that she was unable to walk due to swelling in the legs.  She reports this was insidious in onset and gradually progressing.  She denies any falls.  She reports that she lives alone at home and is unable to take care of regular activities of daily living. Devon Carrasco presents to the emergency room and noted to have swelling in the lower extremities and referred for admission.  Currently patient denies chest pain shortness breath nausea vomiting or diarrhea\". 79 y.o.  female who has a history of hypertension, CVA, carotid stenosis presents with lower extremity swelling and generalized weakness.        Lower extremity edema -resolved  Acute on chronic diastolic heart failure -resolved NYHA class 4 at admission,NYHA class 3 at discharge  - Duplex lower extremities negative for DVT. - received IV lasix.    - Cardiology following--  Recommend she be discharge on Lasix 20 mg PO daily   She is now euvolemic  -on 20 ng lasix - check BMP in 2 -3 days     # Macular rash lower extremities-- resolved   - Possible cellulitis of lower extremities  - Completed IVF Ancef  - Duplex negative for DVT     # Hypertension :  - ct with Coreg,hydralazine     # Generalized weakness  - With underlying history of Parkinson's  - PT/OT evaluation-need rehab at discharge     # Parkinson disease  - Continue Sinemet     # History of coronary disease  - Continue with aspirin Plavix     # CKD stage II   - monitor and avoid nephrotoxins,cr at discharge -1.3     # Paroxsymal A. Fib  - Currently normal sinus rhythm.    - Not on anticoagulation per determination on previous admissions possibly secondary to risk of falls     # History of chronic carotid artery stenosis  Continue with Plavix     # Hyperlipidemia  Continue statin     # Anemia of chronic disease  Monitor hemoglobin     Suspected peripheral neuropathy:  -already on cymbalta    PENDING TEST RESULTS:   At the time of discharge the following test results are still pending: none    FOLLOW UP APPOINTMENTS:    Follow-up Information     Follow up With Specialties Details Why Contact Info    [address] [facility] Rnezo Merauel  Referred for SNF rehab. 125 [facility]  [address]    Taiwo Cuevas, [address]   [address]      Benita Mcbride MD Neurology   [address] [place] [address]  [phone]                   DISCHARGE MEDICATIONS:  Discharge Medication List as of 10/4/2020  2:55 PM      START taking these medications    Details   amLODIPine (NORVASC) 5 mg tablet Take 1 Tab by mouth daily. , No Print, Disp-1 Tab,R-0      carvediloL (COREG) 3.125 mg tablet Take 1 Tab by mouth two (2) times daily (with meals). , No Print, Disp-1 Tab,R-0      hydrALAZINE (APRESOLINE) 25 mg tablet Take 1.5 Tabs by mouth three (3) times daily. , No Print, Disp-1 Tab,R-0      naloxone (NARCAN) 4 mg/actuation nasal spray Use 1 spray intranasally, then discard. Repeat with new spray every 2 min as needed for opioid overdose symptoms, alternating nostrils. , No Print, Disp-1 Each,R-0      furosemide (Lasix) 20 mg tablet Take 1 Tab by mouth daily. , No Print, Disp-1 Tab,R-0      HYDROcodone-acetaminophen (Norco) 5-325 mg per tablet Take 1 Tab by mouth every eight (8) hours as needed for Pain for up to 3 days. Max Daily Amount: 3 Tabs., Print, Disp-3 Tab,R-0         CONTINUE these medications which have NOT CHANGED    Details   atorvastatin (LIPITOR) 40 mg tablet Take 40 mg by mouth nightly., Historical Med      nitroglycerin (Nitrostat) 0.4 mg SL tablet 0.4 mg by SubLINGual route every five (5) minutes as needed for Chest Pain. Up to 3 doses. , Historical Med      aspirin delayed-release (Ecotrin Low Strength) 81 mg tablet Take 81 mg by mouth nightly., Historical Med      clopidogreL (Plavix) 75 mg tab Take 75 mg by mouth daily (after breakfast). , Historical Med      carbidopa-levodopa (SINEMET)  mg per tablet Take 2 Tabs by mouth four (4) times daily. , Phone In, Disp-720 Tab, R-1      acetaminophen (TYLENOL) 325 mg tablet Take 650 mg by mouth every six (6) hours as needed for Pain or Fever., Historical Med      cyanocobalamin (VITAMIN B12) 100 mcg tablet Take 100 mcg by mouth daily. , Historical Med      vit C,D-Ug-cvczb-lutein-zeaxan (PRESERVISION AREDS-2) 494-127-44-1 mg-unit-mg-mg cap capsule Take 1 Cap by mouth two (2) times a day., Historical Med      senna-docusate (SENNA WITH DOCUSATE SODIUM) 8.6-50 mg per tablet Take 1 Tab by mouth nightly., Historical Med      Cholecalciferol, Vitamin D3, 1,000 unit cap Take 1,000 Units by mouth daily. , Historical Med      pantoprazole (PROTONIX) 40 mg tablet Take 40 mg by mouth Daily (before breakfast). Indications: h/o bleeding ulcer with nsaid, Historical Med      DULoxetine (CYMBALTA) 60 mg capsule Take 120 mg by mouth daily. Indications: Anxiousness associated with Depression, Historical Med      multivitamins-minerals-lutein (CENTRUM SILVER) tab tablet Take 1 Tab by mouth daily. , Historical Med         STOP taking these medications HYDROcodone-acetaminophen (NORCO) 7.5-325 mg per tablet Comments:   Reason for Stopping:         diazePAM (VALIUM) 2 mg tablet Comments:   Reason for Stopping:                 NOTIFY YOUR PHYSICIAN FOR ANY OF THE FOLLOWING:   Fever over 101 degrees for 24 hours. Chest pain, shortness of breath, fever, chills, nausea, vomiting, diarrhea, change in mentation, falling, weakness, bleeding. Severe pain or pain not relieved by medications. Or, any other signs or symptoms that you may have questions about. DISPOSITION:    Home With:   OT  PT  HH  RN      x Long term SNF/Inpatient Rehab    Independent/assisted living    Hospice    Other:       PATIENT CONDITION AT DISCHARGE:     Functional status    Poor    x Deconditioned     Independent      Cognition    x Lucid     Forgetful     Dementia      Catheters/lines (plus indication)    Sue     PICC     PEG    x None      Code status   x  Full code     DNR      PHYSICAL EXAMINATION AT DISCHARGE:  Constitutional:  elderly patient    ENT:  Oral mucous moist, Neck supple,    Resp:  CTA bilaterally. No wheezing/rhonchi/rales. No accessory muscle use   CV:  Regular rhythm, normal rate    GI:  Soft, non distended, non tender, normoactive bowel sounds    Musculoskeletal:  no LE edema    Neurologic:  Moves all extremities,alert,awake,oriented X 3.      Psy: calm           CHRONIC MEDICAL DIAGNOSES:  Problem List as of 10/4/2020 Date Reviewed: 5/1/2020          Codes Class Noted - Resolved    CHF exacerbation ([facility] ICD-10-CM: I50.9  ICD-9-CM: 428.0  9/25/2020 - Present        CHF (congestive heart failure) ([facility] ICD-10-CM: I50.9  ICD-9-CM: 428.0  9/22/2020 - Present        Weakness ICD-10-CM: R53.1  ICD-9-CM: 780.79  5/4/2020 - Present        Generalized weakness ICD-10-CM: R53.1  ICD-9-CM: 780.79  4/30/2020 - Present        Carotid artery stenosis, symptomatic, left ICD-10-CM: U85.98  ICD-9-CM: 433.10  7/26/2019 - Present        HTN (hypertension) ICD-10-CM: I10  ICD-9-CM: 401.9 7/17/2019 - Present        Acute ischemic stroke [facility]) ICD-10-CM: I63.9  ICD-9-CM: 434.91  7/12/2019 - Present        Fall ICD-10-CM: W19. Jefm Rim  ICD-9-CM: E888.9  12/24/2018 - Present        CKD (chronic kidney disease), stage III ([facility] ICD-10-CM: N18.30  ICD-9-CM: 585.3  7/8/2015 - Present        Edema ICD-10-CM: R60.9  ICD-9-CM: 782.3  5/6/2015 - Present        Diabetes mellitus ([facility] ICD-10-CM: E11.9  ICD-9-CM: 250.00  5/6/2015 - Present        Postoperative anemia due to acute blood loss ICD-10-CM: D62  ICD-9-CM: 285.1  2/14/2015 - Present        S/P CABG (coronary artery bypass graft) ICD-10-CM: Z95.1  ICD-9-CM: V45.81  2/13/2015 - Present        Syncope ICD-10-CM: R55  ICD-9-CM: 780.2  2/9/2015 - Present        Bradycardia ICD-10-CM: R00.1  ICD-9-CM: 427.89  2/9/2015 - Present        MORENO (acute kidney injury) ([facility] ICD-10-CM: N17.9  ICD-9-CM: 584.9  2/9/2015 - Present        Anemia ICD-10-CM: D64.9  ICD-9-CM: 285.9  9/9/2014 - Present        GI bleed ICD-10-CM: K92.2  ICD-9-CM: 578.9  9/9/2014 - Present        AF (atrial fibrillation) (HCC) ICD-10-CM: I48.91  ICD-9-CM: 427.31  6/20/2014 - Present        Hypotension ICD-10-CM: I95.9  ICD-9-CM: 458.9  6/3/2014 - Present        CAD (coronary artery disease) ICD-10-CM: I25.10  ICD-9-CM: 414.00  6/3/2014 - Present    Overview Signed 2/10/2015 11:06 AM by Rob Mclaughlin     2007 PCI x2 to LAD with STEPHAN             S/P coronary artery stent placement ICD-10-CM: Z95.5  ICD-9-CM: V45.82  6/3/2014 - Present        Renal failure ICD-10-CM: N19  ICD-9-CM: 357  6/3/2014 - Present        Elevated troponin ICD-10-CM: R77.8  ICD-9-CM: 790.6  6/3/2014 - Present        Pericardial effusion ICD-10-CM: I31.3  ICD-9-CM: 423.9  6/3/2014 - Present        Lactic acidosis ICD-10-CM: E87.2  ICD-9-CM: 276.2  6/3/2014 - Present              36 minutes were spent with the patient on counseling and coordination of care    Signed:   Jake Guadarrama MD  10/7/2020  7:36 AM

## 2020-10-22 ENCOUNTER — TELEPHONE (OUTPATIENT)
Dept: FAMILY MEDICINE CLINIC | Age: 80
End: 2020-10-22

## 2020-10-22 NOTE — TELEPHONE ENCOUNTER
[facility]   (previously: At Home)  [phone] [address] [facility], [address] [facility], [address]    Name:  Ju Curiel  YOB: 1940    Incoming call from Cale Baker RN with [address] [place] [address] inquiring about [facility] services for [employer]. This nurse advised her that we currently have a wait list of probably 2-4 months for new patients. Ms. Xenia Meléndez will fax records to us, pt added to the wait list.  Will contact pt/family when we have availability for new patients to see if pt is still interested.     Ricardo Mcintyre RN  Referral Navigator, [facility]

## 2021-03-23 ENCOUNTER — TELEPHONE (OUTPATIENT)
Dept: FAMILY MEDICINE CLINIC | Age: 81
End: 2021-03-23

## 2021-03-23 NOTE — TELEPHONE ENCOUNTER
[facility]   Phone:  [phone]      Fax:  [phone] [address], [address]    Name:  Alma Pump  YOB: 1940    Pt was referred to [facility] in Oct. 2020 by her home health nurse. At that time [facility] was not accepting new patients and nurse stated that pt/family would like for pt's name to be added to our wait list.    Wait list has now opened and this nurse called pt to ask if she is still interested in [facility]. No answer, no option to leave voice mail.         MANDY SterlingN, RN-BC, [facility]  Referral Navigator, [facility]

## 2021-03-29 ENCOUNTER — HOSPITAL ENCOUNTER (INPATIENT)
Age: 81
LOS: 11 days | Discharge: NURSING, REHAB OR LONG-TERM CARE | DRG: 246 | End: 2021-04-09
Attending: EMERGENCY MEDICINE | Admitting: INTERNAL MEDICINE
Payer: MEDICARE

## 2021-03-29 ENCOUNTER — APPOINTMENT (OUTPATIENT)
Dept: VASCULAR SURGERY | Age: 81
DRG: 246 | End: 2021-03-29
Attending: INTERNAL MEDICINE
Payer: MEDICARE

## 2021-03-29 ENCOUNTER — APPOINTMENT (OUTPATIENT)
Dept: GENERAL RADIOLOGY | Age: 81
DRG: 246 | End: 2021-03-29
Attending: EMERGENCY MEDICINE
Payer: MEDICARE

## 2021-03-29 DIAGNOSIS — N18.9 ACUTE RENAL FAILURE SUPERIMPOSED ON CHRONIC KIDNEY DISEASE, UNSPECIFIED CKD STAGE, UNSPECIFIED ACUTE RENAL FAILURE TYPE (HCC): ICD-10-CM

## 2021-03-29 DIAGNOSIS — I25.83 CORONARY ARTERY DISEASE DUE TO LIPID RICH PLAQUE: ICD-10-CM

## 2021-03-29 DIAGNOSIS — N17.9 ACUTE RENAL FAILURE SUPERIMPOSED ON CHRONIC KIDNEY DISEASE, UNSPECIFIED CKD STAGE, UNSPECIFIED ACUTE RENAL FAILURE TYPE (HCC): ICD-10-CM

## 2021-03-29 DIAGNOSIS — I25.10 CORONARY ARTERY DISEASE DUE TO LIPID RICH PLAQUE: ICD-10-CM

## 2021-03-29 DIAGNOSIS — Z95.1 S/P CABG (CORONARY ARTERY BYPASS GRAFT): ICD-10-CM

## 2021-03-29 DIAGNOSIS — I50.9 ACUTE ON CHRONIC CONGESTIVE HEART FAILURE, UNSPECIFIED HEART FAILURE TYPE (HCC): Primary | ICD-10-CM

## 2021-03-29 DIAGNOSIS — Z95.5 S/P CORONARY ARTERY STENT PLACEMENT: ICD-10-CM

## 2021-03-29 DIAGNOSIS — I48.91 ATRIAL FIBRILLATION, UNSPECIFIED TYPE (HCC): ICD-10-CM

## 2021-03-29 DIAGNOSIS — D64.9 ANEMIA, UNSPECIFIED TYPE: ICD-10-CM

## 2021-03-29 DIAGNOSIS — R07.9 CHEST PAIN AT REST: ICD-10-CM

## 2021-03-29 DIAGNOSIS — I21.4 NSTEMI (NON-ST ELEVATED MYOCARDIAL INFARCTION) (HCC): ICD-10-CM

## 2021-03-29 LAB
ALBUMIN SERPL-MCNC: 3.3 G/DL (ref 3.5–5)
ALBUMIN/GLOB SERPL: 1.1 {RATIO} (ref 1.1–2.2)
ALP SERPL-CCNC: 95 U/L (ref 45–117)
ALT SERPL-CCNC: 26 U/L (ref 12–78)
ANION GAP SERPL CALC-SCNC: 12 MMOL/L (ref 5–15)
AST SERPL-CCNC: 25 U/L (ref 15–37)
ATRIAL RATE: 111 BPM
BASOPHILS # BLD: 0.1 K/UL (ref 0–0.1)
BASOPHILS NFR BLD: 1 % (ref 0–1)
BILIRUB SERPL-MCNC: 0.8 MG/DL (ref 0.2–1)
BNP SERPL-MCNC: 6847 PG/ML
BUN SERPL-MCNC: 31 MG/DL (ref 6–20)
BUN/CREAT SERPL: 12 (ref 12–20)
CALCIUM SERPL-MCNC: 8.5 MG/DL (ref 8.5–10.1)
CALCULATED R AXIS, ECG10: -44 DEGREES
CALCULATED T AXIS, ECG11: 119 DEGREES
CHLORIDE SERPL-SCNC: 108 MMOL/L (ref 97–108)
CO2 SERPL-SCNC: 20 MMOL/L (ref 21–32)
COMMENT, HOLDF: NORMAL
COVID-19 RAPID TEST, COVR: NOT DETECTED
CREAT SERPL-MCNC: 2.54 MG/DL (ref 0.55–1.02)
D DIMER PPP FEU-MCNC: 0.65 MG/L FEU (ref 0–0.65)
DIAGNOSIS, 93000: NORMAL
DIFFERENTIAL METHOD BLD: ABNORMAL
EOSINOPHIL # BLD: 0.2 K/UL (ref 0–0.4)
EOSINOPHIL NFR BLD: 3 % (ref 0–7)
ERYTHROCYTE [DISTWIDTH] IN BLOOD BY AUTOMATED COUNT: 16.9 % (ref 11.5–14.5)
GLOBULIN SER CALC-MCNC: 3.1 G/DL (ref 2–4)
GLUCOSE SERPL-MCNC: 61 MG/DL (ref 65–100)
HCT VFR BLD AUTO: 33 % (ref 35–47)
HGB BLD-MCNC: 10.4 G/DL (ref 11.5–16)
IMM GRANULOCYTES # BLD AUTO: 0 K/UL (ref 0–0.04)
IMM GRANULOCYTES NFR BLD AUTO: 0 % (ref 0–0.5)
LYMPHOCYTES # BLD: 0.5 K/UL (ref 0.8–3.5)
LYMPHOCYTES NFR BLD: 6 % (ref 12–49)
MCH RBC QN AUTO: 31.4 PG (ref 26–34)
MCHC RBC AUTO-ENTMCNC: 31.5 G/DL (ref 30–36.5)
MCV RBC AUTO: 99.7 FL (ref 80–99)
MONOCYTES # BLD: 0.5 K/UL (ref 0–1)
MONOCYTES NFR BLD: 6 % (ref 5–13)
NEUTS SEG # BLD: 6.6 K/UL (ref 1.8–8)
NEUTS SEG NFR BLD: 84 % (ref 32–75)
NRBC # BLD: 0 K/UL (ref 0–0.01)
NRBC BLD-RTO: 0 PER 100 WBC
PLATELET # BLD AUTO: 311 K/UL (ref 150–400)
PMV BLD AUTO: 8.5 FL (ref 8.9–12.9)
POTASSIUM SERPL-SCNC: 5.5 MMOL/L (ref 3.5–5.1)
PROT SERPL-MCNC: 6.4 G/DL (ref 6.4–8.2)
Q-T INTERVAL, ECG07: 410 MS
QRS DURATION, ECG06: 152 MS
QTC CALCULATION (BEZET), ECG08: 574 MS
RBC # BLD AUTO: 3.31 M/UL (ref 3.8–5.2)
RBC MORPH BLD: ABNORMAL
RBC MORPH BLD: ABNORMAL
SAMPLES BEING HELD,HOLD: NORMAL
SODIUM SERPL-SCNC: 140 MMOL/L (ref 136–145)
SOURCE, COVRS: NORMAL
TROPONIN I SERPL-MCNC: <0.05 NG/ML
VENTRICULAR RATE, ECG03: 118 BPM
WBC # BLD AUTO: 7.9 K/UL (ref 3.6–11)

## 2021-03-29 PROCEDURE — 36415 COLL VENOUS BLD VENIPUNCTURE: CPT

## 2021-03-29 PROCEDURE — 83880 ASSAY OF NATRIURETIC PEPTIDE: CPT

## 2021-03-29 PROCEDURE — 74011250637 HC RX REV CODE- 250/637: Performed by: INTERNAL MEDICINE

## 2021-03-29 PROCEDURE — 85025 COMPLETE CBC W/AUTO DIFF WBC: CPT

## 2021-03-29 PROCEDURE — 87635 SARS-COV-2 COVID-19 AMP PRB: CPT

## 2021-03-29 PROCEDURE — 74011250637 HC RX REV CODE- 250/637: Performed by: NURSE PRACTITIONER

## 2021-03-29 PROCEDURE — 74011000250 HC RX REV CODE- 250: Performed by: EMERGENCY MEDICINE

## 2021-03-29 PROCEDURE — 96374 THER/PROPH/DIAG INJ IV PUSH: CPT

## 2021-03-29 PROCEDURE — 80053 COMPREHEN METABOLIC PANEL: CPT

## 2021-03-29 PROCEDURE — 84484 ASSAY OF TROPONIN QUANT: CPT

## 2021-03-29 PROCEDURE — 99223 1ST HOSP IP/OBS HIGH 75: CPT | Performed by: INTERNAL MEDICINE

## 2021-03-29 PROCEDURE — 74011000250 HC RX REV CODE- 250: Performed by: NURSE PRACTITIONER

## 2021-03-29 PROCEDURE — 74011250636 HC RX REV CODE- 250/636: Performed by: NURSE PRACTITIONER

## 2021-03-29 PROCEDURE — 99285 EMERGENCY DEPT VISIT HI MDM: CPT

## 2021-03-29 PROCEDURE — 93005 ELECTROCARDIOGRAM TRACING: CPT

## 2021-03-29 PROCEDURE — 93970 EXTREMITY STUDY: CPT

## 2021-03-29 PROCEDURE — 65660000000 HC RM CCU STEPDOWN

## 2021-03-29 PROCEDURE — 71045 X-RAY EXAM CHEST 1 VIEW: CPT

## 2021-03-29 PROCEDURE — 85379 FIBRIN DEGRADATION QUANT: CPT

## 2021-03-29 RX ORDER — SODIUM CHLORIDE 0.9 % (FLUSH) 0.9 %
5-40 SYRINGE (ML) INJECTION EVERY 8 HOURS
Status: DISCONTINUED | OUTPATIENT
Start: 2021-03-29 | End: 2021-04-09 | Stop reason: HOSPADM

## 2021-03-29 RX ORDER — CARVEDILOL 6.25 MG/1
6.25 TABLET ORAL 2 TIMES DAILY WITH MEALS
Status: DISCONTINUED | OUTPATIENT
Start: 2021-03-29 | End: 2021-03-30

## 2021-03-29 RX ORDER — CARVEDILOL 3.12 MG/1
3.12 TABLET ORAL 2 TIMES DAILY WITH MEALS
Status: DISCONTINUED | OUTPATIENT
Start: 2021-03-29 | End: 2021-03-29

## 2021-03-29 RX ORDER — ACETAMINOPHEN 325 MG/1
650 TABLET ORAL
Status: DISCONTINUED | OUTPATIENT
Start: 2021-03-29 | End: 2021-04-09 | Stop reason: HOSPADM

## 2021-03-29 RX ORDER — PANTOPRAZOLE SODIUM 40 MG/1
40 TABLET, DELAYED RELEASE ORAL
Status: DISCONTINUED | OUTPATIENT
Start: 2021-03-30 | End: 2021-04-09 | Stop reason: HOSPADM

## 2021-03-29 RX ORDER — DULOXETIN HYDROCHLORIDE 60 MG/1
120 CAPSULE, DELAYED RELEASE ORAL DAILY
Status: DISCONTINUED | OUTPATIENT
Start: 2021-03-30 | End: 2021-04-09 | Stop reason: HOSPADM

## 2021-03-29 RX ORDER — BUMETANIDE 0.25 MG/ML
2 INJECTION INTRAMUSCULAR; INTRAVENOUS DAILY
Status: DISCONTINUED | OUTPATIENT
Start: 2021-03-30 | End: 2021-04-01

## 2021-03-29 RX ORDER — BUMETANIDE 0.25 MG/ML
1 INJECTION INTRAMUSCULAR; INTRAVENOUS ONCE
Status: COMPLETED | OUTPATIENT
Start: 2021-03-29 | End: 2021-03-29

## 2021-03-29 RX ORDER — LABETALOL HYDROCHLORIDE 5 MG/ML
10 INJECTION, SOLUTION INTRAVENOUS ONCE
Status: COMPLETED | OUTPATIENT
Start: 2021-03-29 | End: 2021-03-29

## 2021-03-29 RX ORDER — PROMETHAZINE HYDROCHLORIDE 25 MG/1
12.5 TABLET ORAL
Status: DISCONTINUED | OUTPATIENT
Start: 2021-03-29 | End: 2021-04-09 | Stop reason: HOSPADM

## 2021-03-29 RX ORDER — ASPIRIN 81 MG/1
81 TABLET ORAL
Status: DISCONTINUED | OUTPATIENT
Start: 2021-03-29 | End: 2021-04-09 | Stop reason: HOSPADM

## 2021-03-29 RX ORDER — HYDRALAZINE HYDROCHLORIDE 20 MG/ML
20 INJECTION INTRAMUSCULAR; INTRAVENOUS
Status: DISCONTINUED | OUTPATIENT
Start: 2021-03-29 | End: 2021-04-09 | Stop reason: HOSPADM

## 2021-03-29 RX ORDER — SODIUM CHLORIDE 0.9 % (FLUSH) 0.9 %
5-40 SYRINGE (ML) INJECTION AS NEEDED
Status: DISCONTINUED | OUTPATIENT
Start: 2021-03-29 | End: 2021-04-09 | Stop reason: HOSPADM

## 2021-03-29 RX ORDER — POLYETHYLENE GLYCOL 3350 17 G/17G
17 POWDER, FOR SOLUTION ORAL DAILY PRN
Status: DISCONTINUED | OUTPATIENT
Start: 2021-03-29 | End: 2021-04-06

## 2021-03-29 RX ORDER — CLOPIDOGREL BISULFATE 75 MG/1
75 TABLET ORAL
Status: DISCONTINUED | OUTPATIENT
Start: 2021-03-30 | End: 2021-04-09 | Stop reason: HOSPADM

## 2021-03-29 RX ORDER — ONDANSETRON 2 MG/ML
4 INJECTION INTRAMUSCULAR; INTRAVENOUS
Status: DISCONTINUED | OUTPATIENT
Start: 2021-03-29 | End: 2021-04-09 | Stop reason: HOSPADM

## 2021-03-29 RX ORDER — ATORVASTATIN CALCIUM 40 MG/1
40 TABLET, FILM COATED ORAL
Status: DISCONTINUED | OUTPATIENT
Start: 2021-03-29 | End: 2021-04-09 | Stop reason: HOSPADM

## 2021-03-29 RX ORDER — ACETAMINOPHEN 650 MG/1
650 SUPPOSITORY RECTAL
Status: DISCONTINUED | OUTPATIENT
Start: 2021-03-29 | End: 2021-04-09 | Stop reason: HOSPADM

## 2021-03-29 RX ORDER — CARBIDOPA AND LEVODOPA 25; 100 MG/1; MG/1
2 TABLET ORAL 4 TIMES DAILY
Status: DISCONTINUED | OUTPATIENT
Start: 2021-03-29 | End: 2021-04-09 | Stop reason: HOSPADM

## 2021-03-29 RX ORDER — HYDRALAZINE HYDROCHLORIDE 25 MG/1
37.5 TABLET, FILM COATED ORAL 3 TIMES DAILY
Status: DISCONTINUED | OUTPATIENT
Start: 2021-03-29 | End: 2021-04-04

## 2021-03-29 RX ADMIN — CARBIDOPA AND LEVODOPA 2 TABLET: 25; 100 TABLET ORAL at 18:52

## 2021-03-29 RX ADMIN — LABETALOL HYDROCHLORIDE 10 MG: 5 INJECTION INTRAVENOUS at 13:48

## 2021-03-29 RX ADMIN — Medication 10 ML: at 16:35

## 2021-03-29 RX ADMIN — BUMETANIDE 1 MG: 0.25 INJECTION, SOLUTION INTRAMUSCULAR; INTRAVENOUS at 16:34

## 2021-03-29 RX ADMIN — ASPIRIN 81 MG: 81 TABLET, COATED ORAL at 21:20

## 2021-03-29 RX ADMIN — SODIUM CHLORIDE 500 ML: 9 INJECTION, SOLUTION INTRAVENOUS at 21:28

## 2021-03-29 RX ADMIN — BUMETANIDE 1 MG: 0.25 INJECTION, SOLUTION INTRAMUSCULAR; INTRAVENOUS at 18:52

## 2021-03-29 RX ADMIN — ATORVASTATIN CALCIUM 40 MG: 40 TABLET, FILM COATED ORAL at 21:20

## 2021-03-29 RX ADMIN — Medication 10 ML: at 21:29

## 2021-03-29 RX ADMIN — CARBIDOPA AND LEVODOPA 2 TABLET: 25; 100 TABLET ORAL at 21:20

## 2021-03-29 RX ADMIN — CARVEDILOL 6.25 MG: 6.25 TABLET, FILM COATED ORAL at 18:52

## 2021-03-29 NOTE — ROUTINE PROCESS
TRANSFER - OUT REPORT: 
 
Verbal report given to Oneyda(name) on Geofm Picking  being transferred to (unit) for routine progression of care Report consisted of patients Situation, Background, Assessment and  
Recommendations(SBAR). Information from the following report(s) SBAR, Kardex, ED Summary, Recent Results and Cardiac Rhythm Afib was reviewed with the receiving nurse. Lines:  
Peripheral IV 03/29/21 Left Antecubital (Active) Opportunity for questions and clarification was provided. Patient transported with: 
 [employer]

## 2021-03-29 NOTE — ED PROVIDER NOTES
HPI     Pt is a 79 y.o. F with PMH of afib, CAD, CHF, HTN, CKD here with c/o shortness of breath that started last night and again this AM.  She denies chest pain. She was found by EMS with oxygen of 90% on RA and it improved along with her work of breathing once oxygen was added. No cough or fever. In addition, she c/o bilateral feet and leg swelling. She did not take any of her home meds today. No other complaints at this time.       Past Medical History:   Diagnosis Date    Anxiety disorder     Atrial fibrillation (HCC)     CAD (coronary artery disease) 2007    stents, CABG x 3v    Carotid stenosis     Cervical stenosis of spinal canal 07/2019    Chronic kidney disease     Cough     CVA (cerebral vascular accident) ([facility] 07/2019    left lacunar infarct at head of caudate    Depression     AND CHRONIC ANXIETY    Diabetes (HCC)     GERD (gastroesophageal reflux disease)     High cholesterol     History of peptic ulcer     Bleeding ulcer with increased NSAID use    Hypertension     Left carotid stenosis 07/2019    s/p left CEA with Dr. Vuong Para, old 2007    PUD (peptic ulcer disease)     Stroke ([facility]     Tremor     Valvular heart disease        Past Surgical History:   Procedure Laterality Date    CARDIAC SURG PROCEDURE UNLIST      CABG X3 VESSEL    HX CAROTID ENDARTERECTOMY  07/2019    HX CORONARY ARTERY BYPASS GRAFT      HX TONSILLECTOMY  1963    TOTAL KNEE ARTHROPLASTY Right 2015         Family History:   Problem Relation Age of Onset    Heart Attack Mother 72        Dec 80yo    Hypertension Mother     Other Father         Unknown    Parkinson's Disease Brother     Anesth Problems Neg Hx        Social History     Socioeconomic History    Marital status: SINGLE     Spouse name: Not on file    Number of children: Not on file    Years of education: Not on file    Highest education level: Not on file   Occupational History    Occupation: Retired realestate/teacher Social Needs    Financial resource strain: Not on file    Food insecurity     Worry: Not on file     Inability: Not on file    Transportation needs     Medical: Not on file     Non-medical: Not on file   Tobacco Use    Smoking status: Former Smoker     Packs/day: 0.25     Years: 5.00     Pack years: 1.25     Types: Cigarettes     Quit date:      Years since quittin.2    Smokeless tobacco: Never Used   Substance and Sexual Activity    Alcohol use: Yes     Alcohol/week: 0.0 standard drinks     Comment: Rare    Drug use: No    Sexual activity: Not on file   Lifestyle    Physical activity     Days per week: Not on file     Minutes per session: Not on file    Stress: Not on file   Relationships    Social connections     Talks on phone: Not on file     Gets together: Not on file     Attends Confucianist service: Not on file     Active member of club or organization: Not on file     Attends meetings of clubs or organizations: Not on file     Relationship status: Not on file    Intimate partner violence     Fear of current or ex partner: Not on file     Emotionally abused: Not on file     Physically abused: Not on file     Forced sexual activity: Not on file   Other Topics Concern    Not on file   Social History Narrative    Lives in [facility] [address]         ALLERGIES: Patient has no known allergies. Review of Systems   Constitutional: Negative for chills, diaphoresis and fever. HENT: Negative for congestion and trouble swallowing. Eyes: Negative for photophobia and visual disturbance. Respiratory: Positive for shortness of breath. Negative for cough and chest tightness. Cardiovascular: Positive for leg swelling. Negative for chest pain and palpitations. Gastrointestinal: Negative for abdominal pain, diarrhea, nausea and vomiting. Genitourinary: Negative for difficulty urinating, dysuria, flank pain and frequency. Musculoskeletal: Negative for back pain and myalgias.    Skin: Negative for rash and wound. Neurological: Negative for dizziness, weakness, light-headedness and headaches. Hematological: Negative for adenopathy. Does not bruise/bleed easily. Psychiatric/Behavioral: Negative for agitation and confusion. All other systems reviewed and are negative. Vitals:    03/29/21 1210   BP: (!) 159/106   Pulse: (!) 126   Resp: 18   Temp: 98 °F (36.7 °C)   SpO2: 94%   Weight: 74.8 kg (165 lb)   Height: 5' 5\" (1.651 m)            Physical Exam  Vitals signs and nursing note reviewed. Constitutional:       General: She is not in acute distress. Appearance: She is well-developed. She is not diaphoretic. HENT:      Head: Normocephalic. Eyes:      Conjunctiva/sclera: Conjunctivae normal.      Pupils: Pupils are equal, round, and reactive to light. Neck:      Musculoskeletal: Normal range of motion and neck supple. Vascular: No JVD. Cardiovascular:      Rate and Rhythm: Tachycardia present. Rhythm irregular. Heart sounds: Normal heart sounds. Pulmonary:      Effort: Pulmonary effort is normal.      Breath sounds: Normal breath sounds. Abdominal:      General: Bowel sounds are normal. There is no distension. Palpations: Abdomen is soft. Tenderness: There is no abdominal tenderness. Musculoskeletal: Normal range of motion. General: No tenderness or deformity. Right lower leg: Edema present. Left lower leg: Edema present. Lymphadenopathy:      Cervical: No cervical adenopathy. Skin:     General: Skin is warm and dry. Capillary Refill: Capillary refill takes less than 2 seconds. Findings: No erythema or rash. Neurological:      Mental Status: She is alert and oriented to person, place, and time. Cranial Nerves: No cranial nerve deficit. Sensory: No sensory deficit. MDM       Procedures    ED EKG interpretation:  Rhythm: atrial fib; and irregular.  Rate (approx.): 118; Axis: left axis deviation; P wave: none; QRS interval: prolonged; ST/T wave: LBBB; EKG documented by Navid Chaney MD, as interpreted by Angella Rodriguez MD, ED MD.    [address] for Admission  2:45 PM to  [address] [facility]    ED Room Number: ER15/15  Patient Name and age:  [facility] 79 y.o.  female  Working Diagnosis:   1. Acute on chronic congestive heart failure, unspecified heart failure type ([facility]    2. Acute renal failure superimposed on chronic kidney disease, unspecified CKD stage, unspecified acute renal failure type ([facility]    3. Atrial fibrillation, unspecified type ([facility]        COVID-19 Suspicion:  no  Sepsis present:  no  Reassessment needed: no  Code Status:  Full Code  Readmission: no  Isolation Requirements:  no  Recommended Level of Care:  telemetry  Department:[facility] Adult ED - 21   Other:      Admit accepted.     Navid Chaney MD

## 2021-03-29 NOTE — Clinical Note
Right femoral artery. Accessed successfully. using ultrasound guidance.  6FR X 10 CM SHEATH INSERTED RFA

## 2021-03-29 NOTE — Clinical Note
Lesion: Located in the Distal LAD. Stent deployed. Single technique and multiple inflations used. First inflation pressure = 12 ignacia; inflation time: 45 sec. Second inflation pressure: 12 ignacia; inflation time: 20 sec.

## 2021-03-29 NOTE — H&P
HISTORY AND PHYSICAL      PCP: Colt Reis DO  History source: Patient       CC: SOB      HPI: A 79year old female patient with PMH of CAD, CHF, Afib not on AC, CKD, HTN, parkinson's presented to ED for evaluation of sob since yesterday. Patient is poor historian and lives in [place]. Pt noticed sob yesterday and she woke up with sob this morning and called 911. She was having worsening sob since 2 weeks with increase in swelling of her legs. She denied chest pain. She does have palpitations. She has been taking her medications regularly. States that she has been not eating well last few days. She denied fever, cough, abd pain, urinary or bowel changes. Per chart review EMS found her in Afib with RVR  In ED, she was in Afib, CXR showed pulmonary edema. One dose of IV labetalol and one dose of IV bumex. Hospitalist consulted for admission.        PMH/PSH:  Past Medical History:   Diagnosis Date    Anxiety disorder     Atrial fibrillation (HCC)     CAD (coronary artery disease) 2007    stents, CABG x 3v    Carotid stenosis     Cervical stenosis of spinal canal 07/2019    Chronic kidney disease     Cough     CVA (cerebral vascular accident) ([facility] [address] 07/2019    left lacunar infarct at head of caudate    Depression     AND CHRONIC ANXIETY    Diabetes (HCC)     GERD (gastroesophageal reflux disease)     High cholesterol     History of peptic ulcer     Bleeding ulcer with increased NSAID use    Hypertension     Left carotid stenosis 07/2019    s/p left CEA with Dr. Olga Bolton, old 2007    PUD (peptic ulcer disease)     Stroke ([facility] [address]     Tremor     Valvular heart disease      Past Surgical History:   Procedure Laterality Date    CARDIAC SURG PROCEDURE UNLIST      CABG X3 VESSEL    HX CAROTID ENDARTERECTOMY  07/2019    HX CORONARY ARTERY BYPASS GRAFT      HX TONSILLECTOMY  1963    TOTAL KNEE ARTHROPLASTY Right 2015       Home meds:   Prior to Admission medications    Medication Sig Start Date End Date Taking? Authorizing Provider   amLODIPine (NORVASC) 5 mg tablet Take 1 Tab by mouth daily. 10/5/20   Selina Zelaya MD   carvediloL (COREG) 3.125 mg tablet Take 1 Tab by mouth two (2) times daily (with meals). 10/4/20   Selina Zelaya MD   hydrALAZINE (APRESOLINE) 25 mg tablet Take 1.5 Tabs by mouth three (3) times daily. 10/4/20   Selina Zelaya MD   naloxone [facility]) 4 mg/actuation nasal spray Use 1 spray intranasally, then discard. Repeat with new spray every 2 min as needed for opioid overdose symptoms, alternating nostrils. 10/4/20   Selina Zelaya MD   furosemide (Lasix) 20 mg tablet Take 1 Tab by mouth daily. 10/4/20   Selina Zelaya MD   atorvastatin (LIPITOR) 40 mg tablet Take 40 mg by mouth nightly. Provider, Historical   nitroglycerin (Nitrostat) 0.4 mg SL tablet 0.4 mg by SubLINGual route every five (5) minutes as needed for Chest Pain. Up to 3 doses. Provider, Historical   aspirin delayed-release (Ecotrin Low Strength) 81 mg tablet Take 81 mg by mouth nightly. Provider, Historical   clopidogreL (Plavix) 75 mg tab Take 75 mg by mouth daily (after breakfast). Provider, Historical   carbidopa-levodopa (SINEMET)  mg per tablet Take 2 Tabs by mouth four (4) times daily. 2/4/20   Devendra Moreno MD   acetaminophen (TYLENOL) 325 mg tablet Take 650 mg by mouth every six (6) hours as needed for Pain or Fever. Provider, Historical   cyanocobalamin (VITAMIN B12) 100 mcg tablet Take 100 mcg by mouth daily. Provider, Historical   vit C,F-Gf-acqvh-lutein-zeaxan (PRESERVISION AREDS-2) 945-029-12-1 mg-unit-mg-mg cap capsule Take 1 Cap by mouth two (2) times a day. Provider, Historical   senna-docusate (SENNA WITH DOCUSATE SODIUM) 8.6-50 mg per tablet Take 1 Tab by mouth nightly. Provider, Historical   Cholecalciferol, Vitamin D3, 1,000 unit cap Take 1,000 Units by mouth daily.     Provider, Historical   pantoprazole (PROTONIX) 40 mg tablet Take 40 mg by mouth Daily (before breakfast). Indications: h/o bleeding ulcer with nsaid    Provider, Historical   DULoxetine (CYMBALTA) 60 mg capsule Take 120 mg by mouth daily. Indications: Anxiousness associated with Depression    Provider, Historical   multivitamins-minerals-lutein (CENTRUM SILVER) tab tablet Take 1 Tab by mouth daily. Provider, Historical       Allergies:  No Known Allergies    FH:  Family History   Problem Relation Age of Onset    Heart Attack Mother 72        Dec 81yo    Hypertension Mother     Other Father         Unknown    Parkinson's Disease Brother     Anesth Problems Neg Hx        SH:  Social History     Tobacco Use    Smoking status: Former Smoker     Packs/day: 0.25     Years: 5.00     Pack years: 1.25     Types: Cigarettes     Quit date:      Years since quittin.2    Smokeless tobacco: Never Used   Substance Use Topics    Alcohol use: Yes     Alcohol/week: 0.0 standard drinks     Comment: Rare       ROS: A comprehensive review of systems was negative except for that written in the HPI.       PHYSICAL EXAM:  Visit Vitals  /89   Pulse (!) 107   Temp 98 °F (36.7 °C)   Resp 16   Ht 5' 5\" (1.651 m)   Wt 74.8 kg (165 lb)   SpO2 94%   BMI 27.46 kg/m²       Gen: NAD, elderly   HEENT: anicteric sclerae, normal conjunctiva, oropharynx clear, MM moist  Neck: supple, trachea midline, no adenopathy  Heart: irregularly irregual , no MRG, no JVD, no peripheral edema  Lungs: decreased at bases  Abd: soft, NT, ND, BS+, no organomegaly  Extr: warm  Skin: dry, no rash  Neuro: CN II-XII grossly intact, normal speech, moves all extremities  Psych: normal mood, appropriate affect    Labs/Imaging:  Recent Results (from the past 24 hour(s))   EKG, 12 LEAD, INITIAL    Collection Time: 21 12:32 PM   Result Value Ref Range    Ventricular Rate 118 BPM    Atrial Rate 111 BPM    QRS Duration 152 ms    Q-T Interval 410 ms    QTC Calculation (Bezet) 574 ms    Calculated R Axis -44 degrees    Calculated T Axis 119 degrees    Diagnosis       Atrial fibrillation  Left axis deviation  Left bundle branch block  When compared with ECG of 02-OCT-2020 10:09,  Atrial fibrillation has replaced Sinus rhythm  Vent. rate has increased BY  59 BPM  Left anterior fascicular block is no longer present  Left bundle branch block is now present  Criteria for Anterior infarct are no longer present  Confirmed by Jaylon Sharma MD, Marilin King ([id number]) on 3/29/2021 2:04:37 PM     SAMPLES BEING HELD    Collection Time: 03/29/21  1:52 PM   Result Value Ref Range    SAMPLES BEING HELD 1red, 1blu     COMMENT        Add-on orders for these samples will be processed based on acceptable specimen integrity and analyte stability, which may vary by analyte. CBC WITH AUTOMATED DIFF    Collection Time: 03/29/21  1:52 PM   Result Value Ref Range    WBC 7.9 3.6 - 11.0 K/uL    RBC 3.31 (L) 3.80 - 5.20 M/uL    HGB 10.4 (L) 11.5 - 16.0 g/dL    HCT 33.0 (L) 35.0 - 47.0 %    MCV 99.7 (H) 80.0 - 99.0 FL    MCH 31.4 26.0 - 34.0 PG    MCHC 31.5 30.0 - 36.5 g/dL    RDW 16.9 (H) 11.5 - 14.5 %    PLATELET 536 694 - 694 K/uL    MPV 8.5 (L) 8.9 - 12.9 FL    NRBC 0.0 0  WBC    ABSOLUTE NRBC 0.00 0.00 - 0.01 K/uL    NEUTROPHILS 84 (H) 32 - 75 %    LYMPHOCYTES 6 (L) 12 - 49 %    MONOCYTES 6 5 - 13 %    EOSINOPHILS 3 0 - 7 %    BASOPHILS 1 0 - 1 %    IMMATURE GRANULOCYTES 0 0.0 - 0.5 %    ABS. NEUTROPHILS 6.6 1.8 - 8.0 K/UL    ABS. LYMPHOCYTES 0.5 (L) 0.8 - 3.5 K/UL    ABS. MONOCYTES 0.5 0.0 - 1.0 K/UL    ABS. EOSINOPHILS 0.2 0.0 - 0.4 K/UL    ABS. BASOPHILS 0.1 0.0 - 0.1 K/UL    ABS. IMM.  GRANS. 0.0 0.00 - 0.04 K/UL    DF SMEAR SCANNED      RBC COMMENTS ANISOCYTOSIS  1+        RBC COMMENTS SPHEROCYTES  PRESENT       TROPONIN I    Collection Time: 03/29/21  1:52 PM   Result Value Ref Range    Troponin-I, Qt. <0.05 <0.05 ng/mL   NT-PRO BNP    Collection Time: 03/29/21  1:52 PM   Result Value Ref Range    NT pro-BNP 6,847 (H) <361 PG/ML   METABOLIC PANEL, COMPREHENSIVE Collection Time: 03/29/21  1:52 PM   Result Value Ref Range    Sodium 140 136 - 145 mmol/L    Potassium 5.5 (H) 3.5 - 5.1 mmol/L    Chloride 108 97 - 108 mmol/L    CO2 20 (L) 21 - 32 mmol/L    Anion gap 12 5 - 15 mmol/L    Glucose 61 (L) 65 - 100 mg/dL    BUN 31 (H) 6 - 20 MG/DL    Creatinine 2.54 (H) 0.55 - 1.02 MG/DL    BUN/Creatinine ratio 12 12 - 20      GFR est AA 22 (L) >60 ml/min/1.73m2    GFR est non-AA 18 (L) >60 ml/min/1.73m2    Calcium 8.5 8.5 - 10.1 MG/DL    Bilirubin, total 0.8 0.2 - 1.0 MG/DL    ALT (SGPT) 26 12 - 78 U/L    AST (SGOT) 25 15 - 37 U/L    Alk. phosphatase 95 45 - 117 U/L    Protein, total 6.4 6.4 - 8.2 g/dL    Albumin 3.3 (L) 3.5 - 5.0 g/dL    Globulin 3.1 2.0 - 4.0 g/dL    A-G Ratio 1.1 1.1 - 2.2         Recent Labs     03/29/21  1352   WBC 7.9   HGB 10.4*   HCT 33.0*        Recent Labs     03/29/21  1352      K 5.5*      CO2 20*   BUN 31*   CREA 2.54*   GLU 61*   CA 8.5     Recent Labs     03/29/21  1352   ALT 26   AP 95   TBILI 0.8   TP 6.4   ALB 3.3*   GLOB 3.1       Recent Labs     03/29/21  1352   TROIQ <0.05       No results for input(s): INR, PTP, APTT, INREXT in the last 72 hours. No results for input(s): PH, PCO2, PO2 in the last 72 hours. All labs and imaging personally reviewed by me. Assessment & Plan:   Acute on chronic CHF   Lower extremity edema   - admit to telemetry  - pro BNP 6847  - CXR: Pulmonary edema. Cardiomegaly  - rapid covid negative. Normal D dimer    - cardiology on board  - restarted home meds aspirin, statin, coreg  - strict I&Os, daily weight   - echo and venous duplex ordered   - c/w IV bumex 2mg daily     Afib with RVR  - HR better controlled now  - c/w coreg  - not on AC per her primary cardiology    MORENO on CKD stage II  - will monitor renal function on diuresis    HTN - BP elevated. C/w coreg, hydralazine.  IV hydralazine prn     Parkinson's - sinemet   Anemia of chronic disease - hb stable   Hx CAD s/p CABG - denied chest pain. C/w aspirin, statin coreg  Hx Carotid stenois s/p CEA on left - c/w Plavix, Lipitor  Hx CVA     DVT ppx: lovenox   Code status: DNR - niece mpoa  Disposition: TBD    Signed By: Bernice Gonzales MD     March 29, 2021

## 2021-03-29 NOTE — ED TRIAGE NOTES
Pt states started having swelling bilateral lower legs last night. Started feeling short of breath this AM. When EMS arrived she had increased respiratory rate and sats on room air at 90%. Pt had improved sats and felt better with 2 liters via NC during transport. EMS also reports pt cardiac rhythm as Afib with RVR.

## 2021-03-29 NOTE — Clinical Note
Lesion located in the Distal LAD. Balloon inserted. Balloon inflated using multiple inflations inflation technique. Lesion #1: Pressure = 8 ignacia; Duration = 16 sec. Inflation 2: Pressure = 8 ignacia; Duration = 7 sec. Inflation 3: Pressure = 8 ignacia; Duration = 5 sec.

## 2021-03-29 NOTE — CONSULTS
Cardiology Consult/Progress Note           3/29/2021 11:53 AM   [facility] ([facility] [I50.9]     HPI:   Justina Glasgow is a 79 y.o. female admitted for CHF exacerbation ([facility] [I50.9]. She presented to the ER with complaints of bilateral lower extremity swelling. She notes that it is been slow in onset occurring over the last week or 2. She denies any chest pain or shortness of breath during this time. She says it is difficult to walk with her swollen legs; she also states that her feet and legs hurt as well. Past medical history for coronary artery disease with bypass, left carotid endarterectomy, hypertension, dyslipidemia, and chronic kidney disease. Investigation:  Telemetry: normal sinus rhythm  ECG:   EKG Results     Procedure 720 Value Units Date/Time    EKG, 12 LEAD, INITIAL [[id number]] Collected: 03/29/21 1232    Order Status: Completed Updated: 03/29/21 1404     Ventricular Rate 118 BPM      Atrial Rate 111 BPM      QRS Duration 152 ms      Q-T Interval 410 ms      QTC Calculation (Bezet) 574 ms      Calculated R Axis -44 degrees      Calculated T Axis 119 degrees      Diagnosis --     Atrial fibrillation  Left axis deviation  Left bundle branch block  When compared with ECG of 02-OCT-2020 10:09,  Atrial fibrillation has replaced Sinus rhythm  Vent. rate has increased BY  59 BPM  Left anterior fascicular block is no longer present  Left bundle branch block is now present  Criteria for Anterior infarct are no longer present  Confirmed by Mónica James MD, Rosemary Norton ([id number]) on 3/29/2021 2:04:37 PM            Echocardiogram:   04/30/20   ECHO ADULT COMPLETE 05/04/2020 5/4/2020    Narrative · Image quality for this study was technically difficult. · Normal cavity size and systolic function (ejection fraction normal). Upper normal wall thickness. Estimated left ventricular ejection fraction   is 55 - 60%. No regional wall motion abnormality noted.  Mild (grade 1) left ventricular diastolic dysfunction. · Moderately dilated left atrium. · Mildly reduced systolic function. · Mildly dilated right atrium. · Mild aortic valve regurgitation is present. · Mitral valve non-specific thickening. Moderate mitral annular   calcification. Moderate mitral valve regurgitation is present. · Mild tricuspid valve regurgitation is present. · Moderate pulmonary hypertension. Pulmonary arterial systolic pressure is   50 mmHg. Signed by: Jesse Us MD          Assessment and Plan     1. B/L LE edema   - 2/2 HTN urgency -    - Resume BP meds,   - IV Bumex 2 mg at least daily and frequency based on clinical response and renal function. 2. HTN : Start BB for rate control with underlying AF, Coreg 6.25 BID  3. PHTN    - PAS on echo 50 mmHg   - Moderate  4. MR   - Moderate on echo   - moderate LA dilation on echo  5. CKD with MORENO   - Probably contributed by elevated renal venous pressures with predominantly RHF. Hopefully should improve with Diuresis  6. CAD   - s/p CABG x 3   - Coreg, Lipitor, Plavix, ASA  04/30/20   NUCLEAR CARDIAC STRESS TEST 05/05/2020, 05/05/2020 5/5/2020    Narrative · Baseline ECG: Normal sinus rhythm, non-specific ST-T wave abnormalities. · NM: No ischemia. Possible small distal anterior infarct. LVEF 53%. Rest and Lexiscan myocardial perfusion SPECT imaging is performed with IV   injections of 10.6 and 32.9 mCi technetium 99m Sestamibi respectively. SPECT images displayed in three planes show no ischemic reversibility. There is a small focus of distal anterior wall fixed thinning, possible   infarct. Gated SPECT images reviewed in 3 planes show unremarkable LV systolic wall   thickening. There is no segmental wall motion abnormality of the left   ventricular myocardium. The calculated LV ejection fraction is 53%. Pulmonary uptake and left ventricular cavity size appear normal.        Impression : No ischemia.  Possible small distal anterior infarct. LVEF 53%. Signed by: Karyle Engel, MD; Conner Mclaughlin MD   7. Carotid dz   - s/p CEA on left   - Plavix, Lipitor  8. HLD  Cholesterol, total 148 09/23/2020 04:27 AM   HDL Cholesterol 52 09/23/2020 04:27 AM   LDL, calculated 83.8 09/23/2020 04:27 AM   VLDL, calculated 12.2 09/23/2020 04:27 AM   Triglyceride 61 09/23/2020 04:27 AM   CHOL/HDL Ratio 2.8 09/23/2020 04:27 AM    - Lipitor PTA  9. Mismatched Bp B/L UE   - R UE    - L UE    - Subclavian duplex to r/o stenosis  10. PAF   - Coreg.   - No OAC with ASA and Plavix use  11. DM   - A1c 5.7   - per Primary team  12. H/o CVA   - left lacunar infarct at head of caudate   - Follow up with Neuro as outpt. 13. Recurrent persistent AF: will defer discussion regarding anticoagulation to her with primary cardiologist Dr. Maninder Lake as outpatient due to comorbidities. B/L edema - multifactorial, related to HTN and right heart failure from PHTN, LBBB and MR. Af also contributing. Needs better rate control. Also had a fall related to leg swelling and pain. Prior echo with evidence for pulmonary HTN/RHF. [x]    High complexity decision making was performed  []    Patient is at high-risk of decompensation with multiple organ involvement    Review of Symptoms:  Respiratory: No exertional dyspnea, orthopnea, PND, cough, hemoptysis, URI. Cardiovascular: No CP, palpitations, sweating, lightheadedness, dizziness, syncope, presyncope.  + lower extremity swelling. *Otherwise no other pertinent positive or negative symptoms on ROS.      Patient Active Problem List    Diagnosis Date Noted    CHF exacerbation ([facility] 09/25/2020    CHF (congestive heart failure) ([facility] 09/22/2020    Weakness 05/04/2020    Generalized weakness 04/30/2020    Carotid artery stenosis, symptomatic, left 07/26/2019    HTN (hypertension) 07/17/2019    Acute ischemic stroke ([facility] 07/12/2019    Fall 12/24/2018    [facility]ch[facility] 07/08/2015    Edema 05/06/2015    Diabetes mellitus ([facility] 05/06/2015    Postoperative anemia due to acute blood loss 02/14/2015    S/P CABG (coronary artery bypass graft) 02/13/2015    Syncope 02/09/2015    Bradycardia 02/09/2015    MORENO (acute kidney injury) ([facility] 02/09/2015    Anemia 09/09/2014    GI bleed 09/09/2014    AF (atrial fibrillation) ([facility] 06/20/2014    Hypotension 06/03/2014    CAD (coronary artery disease) 06/03/2014    S/P coronary artery stent placement 06/03/2014    Renal failure 06/03/2014    Elevated troponin 06/03/2014    Pericardial effusion 06/03/2014    Lactic acidosis 06/03/2014      Chilo Ferro DO  Past Medical History:   Diagnosis Date    Anxiety disorder     Atrial fibrillation ([facility]     CAD (coronary artery disease) 2007    stents, CABG x 3v    Carotid stenosis     Cervical stenosis of spinal canal 07/2019    Chronic kidney disease     Cough     CVA (cerebral vascular accident) ([facility] 07/2019    left lacunar infarct at head of caudate    Depression     AND CHRONIC ANXIETY    Diabetes (HCC)     GERD (gastroesophageal reflux disease)     High cholesterol     History of peptic ulcer     Bleeding ulcer with increased NSAID use    Hypertension     Left carotid stenosis 07/2019    s/p left CEA with Dr. Yajaira Alexander, old 2007    PUD (peptic ulcer disease)     Stroke [facility]     Tremor     Valvular heart disease       Past Surgical History:   Procedure Laterality Date    CARDIAC SURG PROCEDURE UNLIST      CABG X3 VESSEL    HX CAROTID ENDARTERECTOMY  07/2019    HX CORONARY ARTERY BYPASS GRAFT      HX TONSILLECTOMY  1963    TOTAL KNEE ARTHROPLASTY Right 2015     Social History     Socioeconomic History    Marital status: SINGLE     Spouse name: Not on file    Number of children: Not on file    Years of education: Not on file    Highest education level: Not on file   Occupational History    Occupation: Retired realestate/teacher Tobacco Use    Smoking status: Former Smoker     Packs/day: 0.25     Years: 5.00     Pack years: 1.25     Types: Cigarettes     Quit date:      Years since quittin.2    Smokeless tobacco: Never Used   Substance and Sexual Activity    Alcohol use: Yes     Alcohol/week: 0.0 standard drinks     Comment: Rare    Drug use: No   Social History Narrative    L[facility] in [facility]     Family History   Problem Relation Age of Onset    Heart Attack Mother 72        Dec 91yo    Hypertension Mother     Other Father         Unknown    Parkinson's Disease Brother     Anesth Problems Neg Hx       Current Facility-Administered Medications   Medication Dose Route Frequency    sodium chloride (NS) flush 5-40 mL  5-40 mL IntraVENous Q8H    sodium chloride (NS) flush 5-40 mL  5-40 mL IntraVENous PRN    acetaminophen (TYLENOL) tablet 650 mg  650 mg Oral Q6H PRN    Or    acetaminophen (TYLENOL) suppository 650 mg  650 mg Rectal Q6H PRN    polyethylene glycol (MIRALAX) packet 17 g  17 g Oral DAILY PRN    promethazine (PHENERGAN) tablet 12.5 mg  12.5 mg Oral Q6H PRN    Or    ondansetron (ZOFRAN) injection 4 mg  4 mg IntraVENous Q6H PRN    aspirin delayed-release tablet 81 mg  81 mg Oral QHS    atorvastatin (LIPITOR) tablet 40 mg  40 mg Oral QHS    carbidopa-levodopa (SINEMET)  mg per tablet 2 Tab  2 Tab Oral QID    carvediloL (COREG) tablet 3.125 mg  3.125 mg Oral BID WITH MEALS    [START ON 3/30/2021] clopidogreL (PLAVIX) tablet 75 mg  75 mg Oral DAILY AFTER BREAKFAST    [START ON 3/30/2021] DULoxetine (CYMBALTA) capsule 120 mg  120 mg Oral DAILY    [START ON 3/30/2021] pantoprazole (PROTONIX) tablet 40 mg  40 mg Oral ACB     Current Outpatient Medications   Medication Sig    amLODIPine (NORVASC) 5 mg tablet Take 1 Tab by mouth daily.  carvediloL (COREG) 3.125 mg tablet Take 1 Tab by mouth two (2) times daily (with meals).     hydrALAZINE (APRESOLINE) 25 mg tablet Take 1.5 Tabs by mouth three (3) times daily.  naloxone (NARCAN) 4 mg/actuation nasal spray Use 1 spray intranasally, then discard. Repeat with new spray every 2 min as needed for opioid overdose symptoms, alternating nostrils.  furosemide (Lasix) 20 mg tablet Take 1 Tab by mouth daily.  atorvastatin (LIPITOR) 40 mg tablet Take 40 mg by mouth nightly.  nitroglycerin (Nitrostat) 0.4 mg SL tablet 0.4 mg by SubLINGual route every five (5) minutes as needed for Chest Pain. Up to 3 doses.  aspirin delayed-release (Ecotrin Low Strength) 81 mg tablet Take 81 mg by mouth nightly.  clopidogreL (Plavix) 75 mg tab Take 75 mg by mouth daily (after breakfast).  carbidopa-levodopa (SINEMET)  mg per tablet Take 2 Tabs by mouth four (4) times daily.  acetaminophen (TYLENOL) 325 mg tablet Take 650 mg by mouth every six (6) hours as needed for Pain or Fever.  cyanocobalamin (VITAMIN B12) 100 mcg tablet Take 100 mcg by mouth daily.  vit C,F-Kk-yybmq-lutein-zeaxan (PRESERVISION AREDS-2) 728-432-19-1 mg-unit-mg-mg cap capsule Take 1 Cap by mouth two (2) times a day.  senna-docusate (SENNA WITH DOCUSATE SODIUM) 8.6-50 mg per tablet Take 1 Tab by mouth nightly.  Cholecalciferol, Vitamin D3, 1,000 unit cap Take 1,000 Units by mouth daily.  pantoprazole (PROTONIX) 40 mg tablet Take 40 mg by mouth Daily (before breakfast). Indications: h/o bleeding ulcer with nsaid    DULoxetine (CYMBALTA) 60 mg capsule Take 120 mg by mouth daily. Indications: Anxiousness associated with Depression    multivitamins-minerals-lutein (CENTRUM SILVER) tab tablet Take 1 Tab by mouth daily. Prior to Admission Medications   Prescriptions Last Dose Informant Patient Reported? Taking? Cholecalciferol, Vitamin D3, 1,000 unit cap   Yes No   Sig: Take 1,000 Units by mouth daily. DULoxetine (CYMBALTA) 60 mg capsule   Yes No   Sig: Take 120 mg by mouth daily.  Indications: Anxiousness associated with Depression   acetaminophen (TYLENOL) 325 mg tablet   Yes No   Sig: Take 650 mg by mouth every six (6) hours as needed for Pain or Fever. aspirin delayed-release (Ecotrin Low Strength) 81 mg tablet   Yes No   Sig: Take 81 mg by mouth nightly. atorvastatin (LIPITOR) 40 mg tablet   Yes No   Sig: Take 40 mg by mouth nightly. carbidopa-levodopa (SINEMET)  mg per tablet   No No   Sig: Take 2 Tabs by mouth four (4) times daily. clopidogreL (Plavix) 75 mg tab   Yes No   Sig: Take 75 mg by mouth daily (after breakfast). cyanocobalamin (VITAMIN B12) 100 mcg tablet   Yes No   Sig: Take 100 mcg by mouth daily. diazePAM (VALIUM) 2 mg tablet   Yes No   Sig: Take 2 mg by mouth three (3) times daily. Indications: CHRONIC PAIN   multivitamins-minerals-lutein (CENTRUM SILVER) tab tablet   Yes No   Sig: Take 1 Tab by mouth daily. nitroglycerin (Nitrostat) 0.4 mg SL tablet   Yes No   Si.4 mg by SubLINGual route every five (5) minutes as needed for Chest Pain. Up to 3 doses. pantoprazole (PROTONIX) 40 mg tablet   Yes No   Sig: Take 40 mg by mouth Daily (before breakfast). Indications: h/o bleeding ulcer with nsaid   senna-docusate (SENNA WITH DOCUSATE SODIUM) 8.6-50 mg per tablet   Yes No   Sig: Take 1 Tab by mouth nightly. vit C,U-Ha-gvpgi-lutein-zeaxan (PRESERVISION AREDS-2) 566-734-93-1 mg-unit-mg-mg cap capsule   Yes No   Sig: Take 1 Cap by mouth two (2) times a day. Facility-Administered Medications: None      No Known Allergies    Labs:   Recent Results (from the past 24 hour(s))   EKG, 12 LEAD, INITIAL    Collection Time: 21 12:32 PM   Result Value Ref Range    Ventricular Rate 118 BPM    Atrial Rate 111 BPM    QRS Duration 152 ms    Q-T Interval 410 ms    QTC Calculation (Bezet) 574 ms    Calculated R Axis -44 degrees    Calculated T Axis 119 degrees    Diagnosis       Atrial fibrillation  Left axis deviation  Left bundle branch block  When compared with ECG of 02-OCT-2020 10:09,  Atrial fibrillation has replaced Sinus rhythm  Vent. rate has increased BY  59 BPM  Left anterior fascicular block is no longer present  Left bundle branch block is now present  Criteria for Anterior infarct are no longer present  Confirmed by Iwona Sorenson MD, Valladares Breath ([id number]) on 3/29/2021 2:04:37 PM     SAMPLES BEING HELD    Collection Time: 03/29/21  1:52 PM   Result Value Ref Range    SAMPLES BEING HELD 1red, 1blu     COMMENT        Add-on orders for these samples will be processed based on acceptable specimen integrity and analyte stability, which may vary by analyte. CBC WITH AUTOMATED DIFF    Collection Time: 03/29/21  1:52 PM   Result Value Ref Range    WBC 7.9 3.6 - 11.0 K/uL    RBC 3.31 (L) 3.80 - 5.20 M/uL    HGB 10.4 (L) 11.5 - 16.0 g/dL    HCT 33.0 (L) 35.0 - 47.0 %    MCV 99.7 (H) 80.0 - 99.0 FL    MCH 31.4 26.0 - 34.0 PG    MCHC 31.5 30.0 - 36.5 g/dL    RDW 16.9 (H) 11.5 - 14.5 %    PLATELET 893 512 - 573 K/uL    MPV 8.5 (L) 8.9 - 12.9 FL    NRBC 0.0 0  WBC    ABSOLUTE NRBC 0.00 0.00 - 0.01 K/uL    NEUTROPHILS 84 (H) 32 - 75 %    LYMPHOCYTES 6 (L) 12 - 49 %    MONOCYTES 6 5 - 13 %    EOSINOPHILS 3 0 - 7 %    BASOPHILS 1 0 - 1 %    IMMATURE GRANULOCYTES 0 0.0 - 0.5 %    ABS. NEUTROPHILS 6.6 1.8 - 8.0 K/UL    ABS. LYMPHOCYTES 0.5 (L) 0.8 - 3.5 K/UL    ABS. MONOCYTES 0.5 0.0 - 1.0 K/UL    ABS. EOSINOPHILS 0.2 0.0 - 0.4 K/UL    ABS. BASOPHILS 0.1 0.0 - 0.1 K/UL    ABS. IMM.  GRANS. 0.0 0.00 - 0.04 K/UL    DF SMEAR SCANNED      RBC COMMENTS ANISOCYTOSIS  1+        RBC COMMENTS SPHEROCYTES  PRESENT       TROPONIN I    Collection Time: 03/29/21  1:52 PM   Result Value Ref Range    Troponin-I, Qt. <0.05 <0.05 ng/mL   NT-PRO BNP    Collection Time: 03/29/21  1:52 PM   Result Value Ref Range    NT pro-BNP 6,847 (H) <970 PG/ML   METABOLIC PANEL, COMPREHENSIVE    Collection Time: 03/29/21  1:52 PM   Result Value Ref Range    Sodium 140 136 - 145 mmol/L    Potassium 5.5 (H) 3.5 - 5.1 mmol/L    Chloride 108 97 - 108 mmol/L    CO2 20 (L) 21 - 32 mmol/L    Anion gap 12 5 - 15 mmol/L    Glucose 61 (L) 65 - 100 mg/dL    BUN 31 (H) 6 - 20 MG/DL    Creatinine 2.54 (H) 0.55 - 1.02 MG/DL    BUN/Creatinine ratio 12 12 - 20      GFR est AA 22 (L) >60 ml/min/1.73m2    GFR est non-AA 18 (L) >60 ml/min/1.73m2    Calcium 8.5 8.5 - 10.1 MG/DL    Bilirubin, total 0.8 0.2 - 1.0 MG/DL    ALT (SGPT) 26 12 - 78 U/L    AST (SGOT) 25 15 - 37 U/L    Alk. phosphatase 95 45 - 117 U/L    Protein, total 6.4 6.4 - 8.2 g/dL    Albumin 3.3 (L) 3.5 - 5.0 g/dL    Globulin 3.1 2.0 - 4.0 g/dL    A-G Ratio 1.1 1.1 - 2.2       No intake or output data in the 24 hours ending 03/29/21 1640   Patient Vitals for the past 24 hrs:   Temp Pulse Resp BP SpO2   03/29/21 1634  100  (!) 149/88    03/29/21 1430  (!) 107 16 135/89 94 %   03/29/21 1415  97 19 (!) 140/91 94 %   03/29/21 1400  (!) 118 18 121/80 97 %   03/29/21 1348  (!) 121  (!) 162/101    03/29/21 1345  (!) 104 21 (!) 162/101 92 %   03/29/21 1330  (!) 105 20 (!) 170/95 96 %   03/29/21 1315  (!) 114 20 (!) 154/100 96 %   03/29/21 1300  (!) 120 18 (!) 152/95 96 %   03/29/21 1245  (!) 117 19 (!) 149/91    03/29/21 1230  (!) 120 21 (!) 159/88    03/29/21 1215    (!) 148/106 92 %   03/29/21 1210 98 °F (36.7 °C) (!) 126 18 (!) 159/106 94 %        General:    Alert, cooperative, no distress. C/o feet pain   Psychiatric:   Normal Mood and slow affect    Eye/ENT:   Pupils equal, No asymmetry, Conjunctival pink. Able to hear voice at normal amplitude   Lungs:   Visibly symmetric chest expansion, No palpable tenderness. Clear to auscultation bilaterally. Heart:    Regular rate and rhythm, S1, S2 normal, no murmur, click, rub or gallop. No JVD, Normal palpable     peripheral pulses. No cyanosis. 2+ radial pulses B/L   Abdomen:    Soft, non-tender. Bowel sounds normal. No masses,  No organomegaly. Extremities:   Extremities normal, atraumatic. 2+ edema B/L LE   Neurologic:   CN II-XII grossly intact. No gross focal deficits       Regino Funes.  [facility], SHAWN [place] MD  3/29/2021   11:07 AM      [facility] Office:   [facility] Office:  [address]     [address] [facility] [place] [facility]  [address]     [address] [place] [address] N[place]  P: [phone]    P: [phone]  F: [phone]    F: [phone]

## 2021-03-30 ENCOUNTER — APPOINTMENT (OUTPATIENT)
Dept: ULTRASOUND IMAGING | Age: 81
DRG: 246 | End: 2021-03-30
Attending: INTERNAL MEDICINE
Payer: MEDICARE

## 2021-03-30 LAB
ANION GAP SERPL CALC-SCNC: 8 MMOL/L (ref 5–15)
APPEARANCE UR: ABNORMAL
ATRIAL RATE: 41 BPM
ATRIAL RATE: 52 BPM
BACTERIA URNS QL MICRO: ABNORMAL /HPF
BASOPHILS # BLD: 0.1 K/UL (ref 0–0.1)
BASOPHILS NFR BLD: 1 % (ref 0–1)
BILIRUB UR QL: NEGATIVE
BUN SERPL-MCNC: 33 MG/DL (ref 6–20)
BUN/CREAT SERPL: 14 (ref 12–20)
CALCIUM SERPL-MCNC: 8 MG/DL (ref 8.5–10.1)
CALCULATED P AXIS, ECG09: 35 DEGREES
CALCULATED P AXIS, ECG09: 49 DEGREES
CALCULATED R AXIS, ECG10: -31 DEGREES
CALCULATED R AXIS, ECG10: -48 DEGREES
CALCULATED T AXIS, ECG11: 20 DEGREES
CALCULATED T AXIS, ECG11: 91 DEGREES
CHLORIDE SERPL-SCNC: 109 MMOL/L (ref 97–108)
CO2 SERPL-SCNC: 19 MMOL/L (ref 21–32)
COLOR UR: ABNORMAL
CREAT SERPL-MCNC: 2.41 MG/DL (ref 0.55–1.02)
DIAGNOSIS, 93000: NORMAL
DIAGNOSIS, 93000: NORMAL
DIFFERENTIAL METHOD BLD: ABNORMAL
EOSINOPHIL # BLD: 0.3 K/UL (ref 0–0.4)
EOSINOPHIL NFR BLD: 6 % (ref 0–7)
EPITH CASTS URNS QL MICRO: ABNORMAL /LPF
ERYTHROCYTE [DISTWIDTH] IN BLOOD BY AUTOMATED COUNT: 16.5 % (ref 11.5–14.5)
FOLATE SERPL-MCNC: 27.3 NG/ML (ref 5–21)
GLUCOSE SERPL-MCNC: 135 MG/DL (ref 65–100)
GLUCOSE UR STRIP.AUTO-MCNC: NEGATIVE MG/DL
HCT VFR BLD AUTO: 27.7 % (ref 35–47)
HGB BLD-MCNC: 8.8 G/DL (ref 11.5–16)
HGB UR QL STRIP: ABNORMAL
HYALINE CASTS URNS QL MICRO: ABNORMAL /LPF (ref 0–5)
IMM GRANULOCYTES # BLD AUTO: 0.1 K/UL (ref 0–0.04)
IMM GRANULOCYTES NFR BLD AUTO: 1 % (ref 0–0.5)
IRON SATN MFR SERPL: 16 % (ref 20–50)
IRON SERPL-MCNC: 58 UG/DL (ref 35–150)
KETONES UR QL STRIP.AUTO: NEGATIVE MG/DL
LEUKOCYTE ESTERASE UR QL STRIP.AUTO: ABNORMAL
LYMPHOCYTES # BLD: 0.6 K/UL (ref 0.8–3.5)
LYMPHOCYTES NFR BLD: 11 % (ref 12–49)
MCH RBC QN AUTO: 30.9 PG (ref 26–34)
MCHC RBC AUTO-ENTMCNC: 31.8 G/DL (ref 30–36.5)
MCV RBC AUTO: 97.2 FL (ref 80–99)
MONOCYTES # BLD: 0.6 K/UL (ref 0–1)
MONOCYTES NFR BLD: 11 % (ref 5–13)
NEUTS SEG # BLD: 3.7 K/UL (ref 1.8–8)
NEUTS SEG NFR BLD: 70 % (ref 32–75)
NITRITE UR QL STRIP.AUTO: NEGATIVE
NRBC # BLD: 0 K/UL (ref 0–0.01)
NRBC BLD-RTO: 0 PER 100 WBC
P-R INTERVAL, ECG05: 196 MS
P-R INTERVAL, ECG05: 204 MS
PH UR STRIP: 5 [PH] (ref 5–8)
PLATELET # BLD AUTO: 265 K/UL (ref 150–400)
PMV BLD AUTO: 8.3 FL (ref 8.9–12.9)
POTASSIUM SERPL-SCNC: 4.6 MMOL/L (ref 3.5–5.1)
PROT UR STRIP-MCNC: 30 MG/DL
Q-T INTERVAL, ECG07: 548 MS
Q-T INTERVAL, ECG07: 624 MS
QRS DURATION, ECG06: 120 MS
QRS DURATION, ECG06: 128 MS
QTC CALCULATION (BEZET), ECG08: 509 MS
QTC CALCULATION (BEZET), ECG08: 514 MS
RBC # BLD AUTO: 2.85 M/UL (ref 3.8–5.2)
RBC #/AREA URNS HPF: ABNORMAL /HPF (ref 0–5)
RBC MORPH BLD: ABNORMAL
SODIUM SERPL-SCNC: 136 MMOL/L (ref 136–145)
SP GR UR REFRACTOMETRY: 1.01 (ref 1–1.03)
TIBC SERPL-MCNC: 374 UG/DL (ref 250–450)
UROBILINOGEN UR QL STRIP.AUTO: 0.2 EU/DL (ref 0.2–1)
VENTRICULAR RATE, ECG03: 41 BPM
VENTRICULAR RATE, ECG03: 52 BPM
VIT B12 SERPL-MCNC: 1696 PG/ML (ref 193–986)
WBC # BLD AUTO: 5.4 K/UL (ref 3.6–11)
WBC URNS QL MICRO: >100 /HPF (ref 0–4)

## 2021-03-30 PROCEDURE — 97530 THERAPEUTIC ACTIVITIES: CPT

## 2021-03-30 PROCEDURE — 94760 N-INVAS EAR/PLS OXIMETRY 1: CPT

## 2021-03-30 PROCEDURE — 77010033678 HC OXYGEN DAILY

## 2021-03-30 PROCEDURE — 82746 ASSAY OF FOLIC ACID SERUM: CPT

## 2021-03-30 PROCEDURE — 80048 BASIC METABOLIC PNL TOTAL CA: CPT

## 2021-03-30 PROCEDURE — 99233 SBSQ HOSP IP/OBS HIGH 50: CPT | Performed by: SPECIALIST

## 2021-03-30 PROCEDURE — 65660000000 HC RM CCU STEPDOWN

## 2021-03-30 PROCEDURE — 93005 ELECTROCARDIOGRAM TRACING: CPT

## 2021-03-30 PROCEDURE — 97165 OT EVAL LOW COMPLEX 30 MIN: CPT

## 2021-03-30 PROCEDURE — 85025 COMPLETE CBC W/AUTO DIFF WBC: CPT

## 2021-03-30 PROCEDURE — 81001 URINALYSIS AUTO W/SCOPE: CPT

## 2021-03-30 PROCEDURE — 74011250637 HC RX REV CODE- 250/637: Performed by: INTERNAL MEDICINE

## 2021-03-30 PROCEDURE — 83550 IRON BINDING TEST: CPT

## 2021-03-30 PROCEDURE — 97161 PT EVAL LOW COMPLEX 20 MIN: CPT

## 2021-03-30 PROCEDURE — 74011000250 HC RX REV CODE- 250: Performed by: NURSE PRACTITIONER

## 2021-03-30 PROCEDURE — 82607 VITAMIN B-12: CPT

## 2021-03-30 PROCEDURE — 76770 US EXAM ABDO BACK WALL COMP: CPT

## 2021-03-30 PROCEDURE — 36415 COLL VENOUS BLD VENIPUNCTURE: CPT

## 2021-03-30 PROCEDURE — 97116 GAIT TRAINING THERAPY: CPT

## 2021-03-30 PROCEDURE — 97535 SELF CARE MNGMENT TRAINING: CPT

## 2021-03-30 RX ADMIN — DULOXETINE HYDROCHLORIDE 120 MG: 60 CAPSULE, DELAYED RELEASE ORAL at 09:38

## 2021-03-30 RX ADMIN — ATORVASTATIN CALCIUM 40 MG: 40 TABLET, FILM COATED ORAL at 22:42

## 2021-03-30 RX ADMIN — Medication 10 ML: at 14:09

## 2021-03-30 RX ADMIN — PANTOPRAZOLE SODIUM 40 MG: 40 TABLET, DELAYED RELEASE ORAL at 07:27

## 2021-03-30 RX ADMIN — CARBIDOPA AND LEVODOPA 2 TABLET: 25; 100 TABLET ORAL at 09:38

## 2021-03-30 RX ADMIN — ASPIRIN 81 MG: 81 TABLET, COATED ORAL at 22:42

## 2021-03-30 RX ADMIN — CARBIDOPA AND LEVODOPA 2 TABLET: 25; 100 TABLET ORAL at 18:23

## 2021-03-30 RX ADMIN — Medication 10 ML: at 07:27

## 2021-03-30 RX ADMIN — Medication 10 ML: at 22:42

## 2021-03-30 RX ADMIN — CLOPIDOGREL BISULFATE 75 MG: 75 TABLET ORAL at 09:37

## 2021-03-30 RX ADMIN — CARBIDOPA AND LEVODOPA 2 TABLET: 25; 100 TABLET ORAL at 22:42

## 2021-03-30 RX ADMIN — BUMETANIDE 2 MG: 0.25 INJECTION INTRAMUSCULAR; INTRAVENOUS at 09:37

## 2021-03-30 RX ADMIN — CARBIDOPA AND LEVODOPA 2 TABLET: 25; 100 TABLET ORAL at 14:09

## 2021-03-30 NOTE — PROGRESS NOTES
Problem: Self Care Deficits Care Plan (Adult)  Goal: *Acute Goals and Plan of Care (Insert Text)  Description: Description: FUNCTIONAL STATUS PRIOR TO ADMISSION: Patient was modified independent using a wheelchair and at times a RW for functional mobility. HOME SUPPORT PRIOR TO ADMISSION: The patient lived alone with an aide once a week for 4 hours to provide assistance. Occupational Therapy Goals  Initiated 3/30/2021  1. Patient will perform bathing with modified independence within 7 day(s). 2.  Patient will perform grooming standing at sink with modified independence within 7 day(s). 3.  Patient will perform lower body dressing with modified independence within 7 day(s). 4.  Patient will perform toilet transfers with modified independence within 7 day(s). 5.  Patient will perform all aspects of toileting with modified independence within 7 day(s). 6.  Patient will utilize energy conservation techniques during functional activities with verbal cues within 7 day(s). Outcome: Progressing Towards Goal   OCCUPATIONAL THERAPY EVALUATION  Patient: Ange Morris (60 y.o. female)  Date: 3/30/2021  Primary Diagnosis: CHF exacerbation ([address] [I50.9]        Precautions:   Fall    ASSESSMENT  Based on the objective data described below, the patient presents with generalized weakness, SOB with activity, shakiness UEs>LEs, B foot pain (6/10) and decrease independence with all aspects of toileting. Pt recently discharged from [facility] days ago. Pt lives home alone and has family limited support. She receives Meals on Wheels and has a niece that lives in [place]. Pt reported that she is experiencing 9/10 emotional pain. Pt would like to return back to her apt. Recommend HH OT at [address] [place] [address]. Will con't to follow and address listed goals. Current Level of Function Impacting Discharge (ADLs/self-care): min assist with toileting    Functional Outcome Measure:   The patient scored 45/100 on the Barthel outcome measure which is indicative of impairment. Other factors to consider for discharge: lives alone, limited finances, ? care home     Patient will benefit from skilled therapy intervention to address the above noted impairments. PLAN :  Recommendations and Planned Interventions: self care training, functional mobility training, therapeutic activities, patient education and home safety training    Frequency/Duration: Patient will be followed by occupational therapy 5 times a week to address goals. Recommendation for discharge: (in order for the patient to meet his/her long term goals)  Occupational therapy at least 2 days/week in the home     This discharge recommendation:  A follow-up discussion with the attending provider and/or case management is planned    IF patient discharges home will need the following DME: none       SUBJECTIVE:   Patient stated I want to return home.     OBJECTIVE DATA SUMMARY:   HISTORY:   Past Medical History:   Diagnosis Date    Anxiety disorder     Atrial fibrillation (HCC)     CAD (coronary artery disease) 2007    stents, CABG x 3v    Carotid stenosis     Cervical stenosis of spinal canal 07/2019    Chronic kidney disease     Cough     CVA (cerebral vascular accident) ([facility] 07/2019    left lacunar infarct at head of caudate    Depression     AND CHRONIC ANXIETY    Diabetes (HCC)     GERD (gastroesophageal reflux disease)     High cholesterol     History of peptic ulcer     Bleeding ulcer with increased NSAID use    Hypertension     Left carotid stenosis 07/2019    s/p left CEA with Dr. Octavio Dejesus, old 2007    PUD (peptic ulcer disease)     Stroke ([facility]     Tremor     Valvular heart disease      Past Surgical History:   Procedure Laterality Date    CARDIAC SURG PROCEDURE UNLIST      CABG X3 VESSEL    HX CAROTID ENDARTERECTOMY  07/2019    HX CORONARY ARTERY BYPASS GRAFT      HX TONSILLECTOMY  1963    TOTAL KNEE ARTHROPLASTY Right 2015 Expanded or extensive additional review of patient history:     Home Situation  Home Environment: Apartment  # Steps to Enter: 0  One/Two Story Residence: One story  Living Alone: Yes  Support Systems: None  Patient Expects to be Discharged to[de-identified] Apartment        EXAMINATION OF PERFORMANCE DEFICITS:  Cognitive/Behavioral Status:  Neurologic State: Alert  Orientation Level: Oriented X4  Cognition: Appropriate decision making        Safety/Judgement: Awareness of environment    Skin: intact    Edema: none noted    Hearing: Auditory  Auditory Impairment: None    Vision/Perceptual:                                     Range of Motion:    AROM: Generally decreased, functional                         Strength:    Strength: Generally decreased, functional                Coordination:  Coordination: Within functional limits  Fine Motor Skills-Upper: Left Intact; Right Intact    Gross Motor Skills-Upper: Left Impaired;Right Impaired(limited AROM)    Tone & Sensation:    Tone: Normal  Sensation: Impaired(B LEs)                      Balance:  Sitting: Intact; With support  Standing: Intact; With support  Standing - Static: Fair(with hands on the walker)  Standing - Dynamic : Fair;Constant support(with hands on the walker)    Functional Mobility and Transfers for ADLs:  Bed Mobility:  Supine to Sit: Stand-by assistance; Additional time;Bed Modified(HOB elevated, note patient sleeps in recliner)  Sit to Supine: (up in chair after)    Transfers:  Sit to Stand: Contact guard assistance; Adaptive equipment; Additional time  Stand to Sit: Contact guard assistance; Adaptive equipment; Additional time  Bed to Chair: Minimum assistance; Adaptive equipment; Additional time(needs reminders to avoid twisting to sit)    ADL Assessment:  Feeding: Independent    Oral Facial Hygiene/Grooming: Setup    Bathing: Stand-by assistance    Upper Body Dressing: Setup    Lower Body Dressing: Stand-by assistance(pt able to tailor sit to don both slippers)    Toileting: Moderate assistance(managing depends)                ADL Intervention and task modifications:       Cognitive Retraining  Safety/Judgement: Awareness of environment    Therapeutic Exercise:     Functional Measure:  Barthel Index:    Bathin  Bladder: 5  Bowels: 5  Groomin  Dressin  Feeding: 10  Mobility: 0  Stairs: 0  Toilet Use: 5  Transfer (Bed to Chair and Back): 10  Total: 45/100        The Barthel ADL Index: Guidelines  1. The index should be used as a record of what a patient does, not as a record of what a patient could do. 2. The main aim is to establish degree of independence from any help, physical or verbal, however minor and for whatever reason. 3. The need for supervision renders the patient not independent. 4. A patient's performance should be established using the best available evidence. Asking the patient, friends/relatives and nurses are the usual sources, but direct observation and common sense are also important. However direct testing is not needed. 5. Usually the patient's performance over the preceding 24-48 hours is important, but occasionally longer periods will be relevant. 6. Middle categories imply that the patient supplies over 50 per cent of the effort. 7. Use of aids to be independent is allowed. Jarrell Obrien., Barthel, DRennyW. (9023). Functional evaluation: the Barthel Index. [facility] (14)2. FANTA Bacon, Petra Marquez., Ayesha Villalba., [place], [address] [place] [address] (). Measuring the change indisability after inpatient rehabilitation; comparison of the responsiveness of the Barthel Index and Functional [place] Measure. Journal of Neurology, Neurosurgery, and Psychiatry, 66(4), 821-677. Kitty Condon, N.J.A, APARNA Ventura, & Danika Baker M.A. (2004.) Assessment of post-stroke quality of life in cost-effectiveness studies: The usefulness of the Barthel Index and the EuroQoL-5D.  [facility], 13, 021-67 Occupational Therapy Evaluation Charge Determination   History Examination Decision-Making   LOW Complexity : Brief history review  MEDIUM Complexity : 3-5 performance deficits relating to physical, cognitive , or psychosocial skils that result in activity limitations and / or participation restrictions MEDIUM Complexity : Patient may present with comorbidities that affect occupational performnce. Miniml to moderate modification of tasks or assistance (eg, physical or verbal ) with assesment(s) is necessary to enable patient to complete evaluation       Based on the above components, the patient evaluation is determined to be of the following complexity level: LOW   Pain Ratin/10 B foot p! Activity Tolerance:   Fair    After treatment patient left in no apparent distress:    Supine in bed and Call bell within reach    COMMUNICATION/EDUCATION:   The patients plan of care was discussed with: Physical therapist.     Home safety education was provided and the patient/caregiver indicated understanding., Patient/family have participated as able in goal setting and plan of care. and Patient/family agree to work toward stated goals and plan of care. This patients plan of care is appropriate for delegation to [facility].     Thank you for this referral.  Nimesh Snyder OTR/L  Time Calculation: 23 mins

## 2021-03-30 NOTE — PROGRESS NOTES
Ravin Calvillo [facility] 298 telemetry notified RN (myself) that pt showed junctional rhythm with prolonged QT, inverted pwave, and st depression Hr is 39 on strip . Pt is asymptomatic. Notified Harvey Cody NP. Will continue to monitor.

## 2021-03-30 NOTE — PROGRESS NOTES
Bedside and Verbal shift change report given to Kendrick Valdez (oncoming nurse) by Jose Alejandro Yousif (offgoing nurse). Report included the following information SBAR, Kardex, Intake/Output, MAR and Recent Results.

## 2021-03-30 NOTE — PROGRESS NOTES
Tiffany NP Progress Note    Name: Mace Gowers  YOB: 1940  MRN: [id number]  Admission Date: 3/29/2021 11:53 AM    Date of service: 3/30/2021 12:13 AM    Situation / Background:     Primary nurse reporting sudden onset bradycardia and hypotension. Patient is a 71-year-old female admitted today for shortness of breath related to exacerbation of CHF. Significant cardiac history to include A. fib, CAD, carotid stenosis. Has history CVA, CKD, GERD, HTN and PUD. Echocardiogram last year: EF 55-60, aortic regurg moderate pulmonary hypertension. Patient is actually been tachycardic with normal blood pressures since arrival in the ED. She was given a total of 2 mg of Bumex in the ED, 10 mg of labetalol and took her evening Coreg at approximately 7 PM.    Subjective:     Patient feels well and has no complaints, denies chest pain, shortness of breath, dizziness    Objective:        · Alert, oriented to person and place. At baseline  · PERRLA, EOMI, face symmetrical, tongue midline  · Chest clear, no rales, rhonchi or wheezing  · Abdomen soft, nontender not distended  · Bilateral lower extremities with no noted edema    Patient Vitals for the past 12 hrs:   Temp Pulse Resp BP SpO2   03/30/21 0410 98.1 °F (36.7 °C) (!) 48 19 123/63 96 %   03/30/21 0400  (!) 48      03/30/21 0001 97.5 °F (36.4 °C) (!) 46 19 (!) 104/52 96 %   03/30/21 0000  (!) 41      03/29/21 2101 97.7 °F (36.5 °C) (!) 44 19 (!) 81/45 95 %   03/29/21 2000 97.8 °F (36.6 °C) (!) 45 19 113/68 95 %        Assessment/ Plan:     Hypotension, bradycardia -likely side effect recent beta-blocker  · EKG: Sinus bradycardia, rate 44, no ectopy or acute changes  · Hold parameters on carvedilol  · 500 mL saline bolus  · Continue to monitor.   Will consider glucagon and/or upgrade to IMCU if patient becomes symptomatic      Moses Boland MSN, RN, NP-C  [phone] or via [employer]

## 2021-03-30 NOTE — NURSE NAVIGATOR
Chart reviewed by Heart Failure Nurse Navigator. Heart Failure database completed. EF:  pending    ACEi/ARB/ARNi: echo pending    BB: echo pending    Aldosterone Antagonist: echo pending    Obstructive Sleep Apnea Screening: N/4   STOP-BANG score:   Referred to Sleep Medicine:     CRT not indicated DNR    NYHA Functional Class requested via provider message on [employer]. Heart Failure Teach Back in Patient Education. Heart Failure Avoiding Triggers on Discharge Instructions. Cardiologist:       Post discharge follow up phone call to be made within 48-72 hours of discharge.

## 2021-03-30 NOTE — PROGRESS NOTES
Bedside and Verbal shift change report given to [facility] (oncoming nurse) by Leroy Ahn (offgoing nurse). Report included the following information SBAR, Kardex, Procedure Summary, Intake/Output, MAR, Recent Results, Cardiac Rhythm BBB,Shelton Gamino, Alarm Parameters  and Procedure Verification.

## 2021-03-30 NOTE — PROGRESS NOTES
Blood pressure 149 60 and pulse is 56 on Right upper arm, automatic. Blood pressure is 154 66 and pulse is 55 on Left Upper arm, automatic.

## 2021-03-30 NOTE — PROGRESS NOTES
Hospitalist Progress Note  Jolanta Schmitz MD  Answering service: [phone] from in house phone      Date of Service:  3/30/2021  NAME:  Harriett Fang  :  1940  MRN:  [id number]    Admission Summary:   80F p/w SOB, edema  Interval history / Subjective:   Patient seen and examined at bedside, feels well, comfortable at rest on 2L O2. Wean off as able. Get PT/OT eval     Assessment & Plan:     Acute on chronic CHF: - pro BNP 6847- CXR: Pulmonary edema. Cardiomegaly  - telemetry- rapid covid negative. Normal D dimer. IV diuresis, monitor lytes. - cardiology following- restarted home meds aspirin, statin, coreg- strict I&Os, daily weight   - LE duplex: -ve for DVTs. TTE pending. PT/OT eval     Afib with RVR-  Rate controlled now. c/w coreg- not on AC per her primary cardiology     MORENO on CKD stage 3- will monitor renal function on diuresis  HTN - BP elevated. C/w coreg, hydralazine. IV hydralazine prn  Parkinson's - sinemet   Anemia of chronic disease - hb stable. monitor  Hx CAD s/p CABG - denied chest pain. C/w aspirin, statin coreg  Hx Carotid stenois s/p CEA on left - c/w Plavix, Lipitor  Hx CVA     Code status: DNR- mPOA niece  DVT prophylaxis: Lovenox  Care Plan discussed with: Patient/Family and Nurse  Disposition: TBD 1-2days     Hospital Problems  Date Reviewed: 2020          Codes Class Noted POA    CHF exacerbation ([facility] ICD-10-CM: I50.9  ICD-9-CM: 428.0  2020 Unknown            Review of Systems:   Pertinent items are mentioned in interval history. Vital Signs:    Last 24hrs VS reviewed since prior progress note.  Most recent are:  Visit Vitals  /63 (BP 1 Location: Left upper arm, BP Patient Position: At rest)   Pulse (!) 48   Temp 98.1 °F (36.7 °C)   Resp 19   Ht 5' 5\" (1.651 m)   Wt 74.8 kg (165 lb)   SpO2 96%   BMI 27.46 kg/m²         Intake/Output Summary (Last 24 hours) at 3/30/2021 2352  Last data filed at 3/30/2021 0410  Gross per 24 hour   Intake 462 ml   Output 650 ml   Net -188 ml        Physical Examination:   Evaluated face to face and examined 03/30/21    General:  Alert, oriented, No acute distress, obese [employer], looks pale  Card:  S1, S2 without murmur, good peripheral perfusion  Resp:  No accessory muscle use, no wheezes, few crepitations  Abd:  Soft, non-tender, non-distended  Extremities:  No cyanosis or clubbing, no significant edema  Neuro:  Grossly normal, no focal neuro deficits, follows commands   Psych:  Good insight, not agitated. Data Review:    Review and/or order of clinical lab test  Review and/or order of tests in the radiology section of CPT  Review and/or order of tests in the medicine section of CPT  Labs:     Recent Labs     03/30/21  0259 03/29/21  1352   WBC 5.4 7.9   HGB 8.8* 10.4*   HCT 27.7* 33.0*    311     Recent Labs     03/30/21  0259 03/29/21  1352    140   K 4.6 5.5*   * 108   CO2 19* 20*   BUN 33* 31*   CREA 2.41* 2.54*   * 61*   CA 8.0* 8.5     Recent Labs     03/29/21  1352   ALT 26   AP 95   TBILI 0.8   TP 6.4   ALB 3.3*   GLOB 3.1     No results for input(s): INR, PTP, APTT, INREXT in the last 72 hours. No results for input(s): FE, TIBC, PSAT, FERR in the last 72 hours. No results found for: FOL, RBCF   No results for input(s): PH, PCO2, PO2 in the last 72 hours.   Recent Labs     03/29/21  1352   TROIQ <0.05     Lab Results   Component Value Date/Time    Cholesterol, total 148 09/23/2020 04:27 AM    HDL Cholesterol 52 09/23/2020 04:27 AM    LDL, calculated 83.8 09/23/2020 04:27 AM    Triglyceride 61 09/23/2020 04:27 AM    CHOL/HDL Ratio 2.8 09/23/2020 04:27 AM     Lab Results   Component Value Date/Time    Glucose (POC) 146 (H) 10/02/2020 10:28 AM    Glucose (POC) 181 (H) 10/02/2020 10:04 AM    Glucose (POC) 122 (H) 09/24/2020 09:35 PM    Glucose (POC) 132 (H) 09/24/2020 04:20 PM    Glucose (POC) 118 (H) 05/11/2020 11:35 AM     Lab Results   Component Value Date/Time    Color YELLOW/STRAW 09/22/2020 06:12 PM    Appearance CLEAR 09/22/2020 06:12 PM    Specific gravity 1.009 09/22/2020 06:12 PM    pH (UA) 7.0 09/22/2020 06:12 PM    Protein 100 (A) 09/22/2020 06:12 PM    Glucose Negative 09/22/2020 06:12 PM    Ketone Negative 09/22/2020 06:12 PM    Bilirubin Negative 09/22/2020 06:12 PM    Urobilinogen 0.2 09/22/2020 06:12 PM    Nitrites Negative 09/22/2020 06:12 PM    Leukocyte Esterase TRACE (A) 09/22/2020 06:12 PM    Epithelial cells FEW 09/22/2020 06:12 PM    Bacteria Negative 09/22/2020 06:12 PM    WBC 0-4 09/22/2020 06:12 PM    RBC 0-5 09/22/2020 06:12 PM     Medications Reviewed:     Current Facility-Administered Medications   Medication Dose Route Frequency    sodium chloride (NS) flush 5-40 mL  5-40 mL IntraVENous Q8H    sodium chloride (NS) flush 5-40 mL  5-40 mL IntraVENous PRN    acetaminophen (TYLENOL) tablet 650 mg  650 mg Oral Q6H PRN    Or    acetaminophen (TYLENOL) suppository 650 mg  650 mg Rectal Q6H PRN    polyethylene glycol (MIRALAX) packet 17 g  17 g Oral DAILY PRN    promethazine (PHENERGAN) tablet 12.5 mg  12.5 mg Oral Q6H PRN    Or    ondansetron (ZOFRAN) injection 4 mg  4 mg IntraVENous Q6H PRN    aspirin delayed-release tablet 81 mg  81 mg Oral QHS    atorvastatin (LIPITOR) tablet 40 mg  40 mg Oral QHS    carbidopa-levodopa (SINEMET)  mg per tablet 2 Tab  2 Tab Oral QID    clopidogreL (PLAVIX) tablet 75 mg  75 mg Oral DAILY AFTER BREAKFAST    DULoxetine (CYMBALTA) capsule 120 mg  120 mg Oral DAILY    pantoprazole (PROTONIX) tablet 40 mg  40 mg Oral ACB    [Held by provider] carvediloL (COREG) tablet 6.25 mg  6.25 mg Oral BID WITH MEALS    bumetanide (BUMEX) injection 2 mg  2 mg IntraVENous DAILY    [Held by provider] hydrALAZINE (APRESOLINE) tablet 37.5 mg  37.5 mg Oral TID    hydrALAZINE (APRESOLINE) 20 mg/mL injection 20 mg  20 mg IntraVENous Q6H PRN ______________________________________________________________________  EXPECTED LENGTH OF STAY: - - -  ACTUAL LENGTH OF STAY:          1               Guillermina Barrios MD

## 2021-03-30 NOTE — PROGRESS NOTES
TRANSITION OF CARE  RUR--28%  Disposition--TBD  PT evaluation 3/30/21: HH versus SNF  Note: currently open to a home health agency but could not remember agency name. Transport--TBD. If discharged home, may need wheelchair van. Patient's primary family contact: sandy Funez cell [phone]    CM gave patient first Medicare IM Letter which informed patient of the right to appeal the discharge. Patient signed the original which was given to the patient and a copy was placed on the chart. Care Management Interventions  PCP Verified by CM: Yes  Transition of Care Consult (CM Consult): Other(TBD)  Physical Therapy Consult: Yes  Occupational Therapy Consult: Yes  Current Support Network: Lives Alone  Confirm Follow Up Transport: Other (see comment)(TBD)  The Plan for Transition of Care is Related to the Following Treatment Goals : TBD  Discharge Location  Discharge Placement: Other:(TBD)    Reason for Admission:  Acute on chronic CHF           A fib with RVR           History of HTN, Parkinsons, CAD with s/p  CABG, carotid stenosis with s/p CEA on the left. RUR Score:   28%                  Plan for utilizing home health:      Open to a home health agency/could not remember name. PCP: First and Last name:  Chilo Ferro DO   Name of Practice:    Are you a current patient: Yes    Approximate date of last visit: 2 to 4 weeks ago. Can you participate in a virtual visit with your PCP:                     Current Advanced Directive/Advance Care Plan: DNR         AMD 2/12/15 scanned to chart. Healthcare Decision Maker:   Click here to complete [employer] including selection of the Healthcare Decision Maker Relationship (ie \"Primary\")    Patient confrirmed AMD decicion maker:  Primary: payal Funez cell [phone]  Secondary: None                   Transition of Care Plan:   CM met with patient.  Patient lives alone in an apartment at [facility] Patient has niece Attila Arzate who is main support, but she recently moved to [facility]. She has a niece Tanner Cobos who lives locally but there is very limited contact. Patient does not report any other family support, and report of social support is vague. Patient states that she is wheelchair bound in her apartment, and does her ADL's albeit slower than previously. Patient confirmed PCP, health insurance, and prescription coverage. Home Health: open to unnamed agency  SNF rehab: [facility]  DME: wheelchair, walker, bsc, shower chair, raised toilet seat.

## 2021-03-30 NOTE — PROGRESS NOTES
Cardiology Progress Note            Admit Date: 3/29/2021  Admit Diagnosis: CHF exacerbation ([facility] [address] [I50.9]  Date: 3/30/2021     Time: 8:44 AM    Subjective:   Denies chest pain, SOB, dizziness. Didn't sleep well last night, too many interruptions. Feels a little better today. She converted to NSR/Sinus bradycardia overnight with episode of HR to upper 30's and few pauses up to 3.46 seconds. Was hypotensive. Coreg held. HR better now in upper 40's-60's NSR- SB with IVCD. Hgb is trending down, now 8.8. Renal function worse than last year- renal consult pending. Says she has history of bleeding ulcer but denies any recent blood in stool or black stools. Reports she has had loose stools recently. Assessment and Plan     1. PAF: recurrent   -Now NSR-sinus bradycardia with prolonged QtC   -Stop coreg. -TSH 3.22   -Echo:Last echo 5/4/20 EF 55-60%, mod MR,mild AI, mildly reduced RV function   [id number] vasc= at least 8. No OAC given worsening anemia. 2. Fluid overload, BLE edema, pulmonary edema   -BLE Edema improved. - May be 2/2  suspect pulmonary HTN/mild RV dysfunction and renal dysfunction but checking echo. -BLE US with no evidence of DVT   -Repeat echo pending. -BP controlled. -Continue IV Bumex 2 mg daily, monitor renal function. 3. Prolonged Qtc : 512 msec on EKG yesterday   -Repeat EKG unchanged   -IVCD noted- no complete LBBB on EKG review. 4. MORENO on CKD vs progression of CKD   -Creatinine slight trend down today. Lab Results   Component Value Date/Time    Creatinine (POC) 1.6 (H) 02/24/2020 10:32 AM    Creatinine 2.41 (H) 03/30/2021 02:59 AM                 - Nephrology consult pending     3. History of HTN :    -BP low overnight   -Stop coreg.    -Off home amlodipine   -Hold home hydralazine.      4. Anemia, normocytic   -Hgb with Notable drop today to 8.8 from 10.4 yesterday.   -Check stool OB     5. PHTN               - PAS on echo 50 mmHg on echo 5/2020              - Moderate   -Repeat echo     6 MR              - Moderate on echo in 5/2020              - moderate LA dilation on echo    7. CAD              - s/p CABG x 3 2015,  Stent 2007              - Coreg, Lipitor,, ASA- stop Plavix. -Nuclear stress 5/5/2020: No ischemia, possile small distal anterior infarct EF 53%      Other comorbids:  8. Carotid dz              - s/p CEA on left              - Plavix, Lipitor- holding plavix. 9. HLD:   -LDL 83, HDL 52 (9/2020)             - Lipitor   10. [employer] History of  Mismatched Bp B/L UE   -On prior admission   -Will recheck. 11. DM              - A1C 5.7              - per Primary team  12. H/o CVA              - left lacunar infarct at head of caudate   13. Hx of Parkinsons. Improved BLE edema and now denies SOB. She did convert to NSR overnight- sinus bradycardia and suspect she probably has an element of SSS given bradycardia with low dose coreg x 1 dose. She is more anemic today with hgb 8.8. Will stop plavix and check stool for OB- further evaluation per primary team.    Will follow up repeat echocardiogram.   Cardiology Attending:Patient seen and examined. I agree with NP assessment and plans. May have GI blood loss, CKD is worse. Hold beta blocker for now given SSS.     Kay Garcia MD 3/30/2021 11:20 AM            PMH  Past Medical History:   Diagnosis Date    Anxiety disorder     Atrial fibrillation ([address]     CAD (coronary artery disease) 2007    stents, CABG x 3v    Carotid stenosis     Cervical stenosis of spinal canal 07/2019    Chronic kidney disease     Cough     CVA (cerebral vascular accident) ([address] 07/2019    left lacunar infarct at head of caudate    Depression     AND CHRONIC ANXIETY    Diabetes ([address]     GERD (gastroesophageal reflux disease)     High cholesterol     History of peptic ulcer     Bleeding ulcer with increased NSAID use    Hypertension     Left carotid stenosis 2019    s/p left CEA with Dr. Cary Salazar, old 2007    PUD (peptic ulcer disease)     Stroke ([facility] [address]     Tremor     Valvular heart disease       Social Hx  Social History     Socioeconomic History    Marital status: SINGLE     Spouse name: Not on file    Number of children: Not on file    Years of education: Not on file    Highest education level: Not on file   Occupational History    Occupation: Retired realestate/teacher   Social Needs    Financial resource strain: Not on file    Food insecurity     Worry: Not on file     Inability: Not on file   [employer] needs     Medical: Not on file     Non-medical: Not on file   Tobacco Use    Smoking status: Former Smoker     Packs/day: 0.25     Years: 5.00     Pack years: 1.25     Types: Cigarettes     Quit date:      Years since quittin.2    Smokeless tobacco: Never Used   Substance and Sexual Activity    Alcohol use:  Yes     Alcohol/week: 0.0 standard drinks     Comment: Rare    Drug use: No    Sexual activity: Not on file   Lifestyle    Physical activity     Days per week: Not on file     Minutes per session: Not on file    Stress: Not on file   Relationships    Social connections     Talks on phone: Not on file     Gets together: Not on file     Attends Hoahaoism service: Not on file     Active member of club or organization: Not on file     Attends meetings of clubs or organizations: Not on file     Relationship status: Not on file    Intimate partner violence     Fear of current or ex partner: Not on file     Emotionally abused: Not on file     Physically abused: Not on file     Forced sexual activity: Not on file   Other Topics Concern    Not on file   Social History Narrative    Lives in [facility] alone       Objective:   Physical Exam:                Visit Vitals  /63 (BP 1 Location: Left upper arm, BP Patient Position: At rest)   Pulse (!) 48   Temp 98.1 °F (36.7 °C)   Resp 19   Ht 5' 5\" (1.651 m)   Wt 165 lb (74.8 kg)   SpO2 96%   BMI 27.46 kg/m²          General Appearance:   Well developed, pale, alert and oriented x 3, and   individual in no acute distress. Ears/Nose/Mouth/Throat:    Hearing grossly normal.         Neck:  Supple. Chest:    Lungs with bibasilar crackles, diminished left base. Cardiovascular:  Regular rate and rhythm, S1, S2 normal, no murmur. Abdomen:    Soft, non-tender, bowel sounds are active. Extremities:  trace edema bilaterally. Skin:  Warm and dry. Pale. Telemetry: NSR- sinus bradycardia currently. Data Review:    Labs:    Recent Results (from the past 24 hour(s))   EKG, 12 LEAD, INITIAL    Collection Time: 03/29/21 12:32 PM   Result Value Ref Range    Ventricular Rate 118 BPM    Atrial Rate 111 BPM    QRS Duration 152 ms    Q-T Interval 410 ms    QTC Calculation (Bezet) 574 ms    Calculated R Axis -44 degrees    Calculated T Axis 119 degrees    Diagnosis       Atrial fibrillation  Left axis deviation  Left bundle branch block  When compared with ECG of 02-OCT-2020 10:09,  Atrial fibrillation has replaced Sinus rhythm  Vent. rate has increased BY  59 BPM  Left anterior fascicular block is no longer present  Left bundle branch block is now present  Criteria for Anterior infarct are no longer present  Confirmed by Amiel Buerger, MD, [facility] ([id number]) on 3/29/2021 2:04:37 PM     SAMPLES BEING HELD    Collection Time: 03/29/21  1:52 PM   Result Value Ref Range    SAMPLES BEING HELD 1red, 1blu     COMMENT        Add-on orders for these samples will be processed based on acceptable specimen integrity and analyte stability, which may vary by analyte.    CBC WITH AUTOMATED DIFF    Collection Time: 03/29/21  1:52 PM   Result Value Ref Range    WBC 7.9 3.6 - 11.0 K/uL    RBC 3.31 (L) 3.80 - 5.20 M/uL    HGB 10.4 (L) 11.5 - 16.0 g/dL    HCT 33.0 (L) 35.0 - 47.0 %    MCV 99.7 (H) 80.0 - 99.0 FL    MCH 31.4 26.0 - 34.0 PG    MCHC 31.5 30.0 - 36.5 g/dL    RDW 16.9 (H) 11.5 - 14.5 %    PLATELET 363 101 - 184 K/uL    MPV 8.5 (L) 8.9 - 12.9 FL    NRBC 0.0 0  WBC    ABSOLUTE NRBC 0.00 0.00 - 0.01 K/uL    NEUTROPHILS 84 (H) 32 - 75 %    LYMPHOCYTES 6 (L) 12 - 49 %    MONOCYTES 6 5 - 13 %    EOSINOPHILS 3 0 - 7 %    BASOPHILS 1 0 - 1 %    IMMATURE GRANULOCYTES 0 0.0 - 0.5 %    ABS. NEUTROPHILS 6.6 1.8 - 8.0 K/UL    ABS. LYMPHOCYTES 0.5 (L) 0.8 - 3.5 K/UL    ABS. MONOCYTES 0.5 0.0 - 1.0 K/UL    ABS. EOSINOPHILS 0.2 0.0 - 0.4 K/UL    ABS. BASOPHILS 0.1 0.0 - 0.1 K/UL    ABS. IMM. GRANS. 0.0 0.00 - 0.04 K/UL    DF SMEAR SCANNED      RBC COMMENTS ANISOCYTOSIS  1+        RBC COMMENTS SPHEROCYTES  PRESENT       TROPONIN I    Collection Time: 03/29/21  1:52 PM   Result Value Ref Range    Troponin-I, Qt. <0.05 <0.05 ng/mL   NT-PRO BNP    Collection Time: 03/29/21  1:52 PM   Result Value Ref Range    NT pro-BNP 6,847 (H) <009 PG/ML   METABOLIC PANEL, COMPREHENSIVE    Collection Time: 03/29/21  1:52 PM   Result Value Ref Range    Sodium 140 136 - 145 mmol/L    Potassium 5.5 (H) 3.5 - 5.1 mmol/L    Chloride 108 97 - 108 mmol/L    CO2 20 (L) 21 - 32 mmol/L    Anion gap 12 5 - 15 mmol/L    Glucose 61 (L) 65 - 100 mg/dL    BUN 31 (H) 6 - 20 MG/DL    Creatinine 2.54 (H) 0.55 - 1.02 MG/DL    BUN/Creatinine ratio 12 12 - 20      GFR est AA 22 (L) >60 ml/min/1.73m2    GFR est non-AA 18 (L) >60 ml/min/1.73m2    Calcium 8.5 8.5 - 10.1 MG/DL    Bilirubin, total 0.8 0.2 - 1.0 MG/DL    ALT (SGPT) 26 12 - 78 U/L    AST (SGOT) 25 15 - 37 U/L    Alk.  phosphatase 95 45 - 117 U/L    Protein, total 6.4 6.4 - 8.2 g/dL    Albumin 3.3 (L) 3.5 - 5.0 g/dL    Globulin 3.1 2.0 - 4.0 g/dL    A-G Ratio 1.1 1.1 - 2.2     COVID-19 RAPID TEST    Collection Time: 03/29/21  4:41 PM   Result Value Ref Range    Specimen source Nasopharyngeal      COVID-19 rapid test Not detected NOTD     D DIMER    Collection Time: 03/29/21  4:41 PM   Result Value Ref Range    D-dimer 0.65 0.00 - 0.65 mg/L FEU   METABOLIC PANEL, BASIC Collection Time: 03/30/21  2:59 AM   Result Value Ref Range    Sodium 136 136 - 145 mmol/L    Potassium 4.6 3.5 - 5.1 mmol/L    Chloride 109 (H) 97 - 108 mmol/L    CO2 19 (L) 21 - 32 mmol/L    Anion gap 8 5 - 15 mmol/L    Glucose 135 (H) 65 - 100 mg/dL    BUN 33 (H) 6 - 20 MG/DL    Creatinine 2.41 (H) 0.55 - 1.02 MG/DL    BUN/Creatinine ratio 14 12 - 20      GFR est AA 23 (L) >60 ml/min/1.73m2    GFR est non-AA 19 (L) >60 ml/min/1.73m2    Calcium 8.0 (L) 8.5 - 10.1 MG/DL   CBC WITH AUTOMATED DIFF    Collection Time: 03/30/21  2:59 AM   Result Value Ref Range    WBC 5.4 3.6 - 11.0 K/uL    RBC 2.85 (L) 3.80 - 5.20 M/uL    HGB 8.8 (L) 11.5 - 16.0 g/dL    HCT 27.7 (L) 35.0 - 47.0 %    MCV 97.2 80.0 - 99.0 FL    MCH 30.9 26.0 - 34.0 PG    MCHC 31.8 30.0 - 36.5 g/dL    RDW 16.5 (H) 11.5 - 14.5 %    PLATELET 850 421 - 589 K/uL    MPV 8.3 (L) 8.9 - 12.9 FL    NRBC 0.0 0  WBC    ABSOLUTE NRBC 0.00 0.00 - 0.01 K/uL    NEUTROPHILS 70 32 - 75 %    LYMPHOCYTES 11 (L) 12 - 49 %    MONOCYTES 11 5 - 13 %    EOSINOPHILS 6 0 - 7 %    BASOPHILS 1 0 - 1 %    IMMATURE GRANULOCYTES 1 (H) 0.0 - 0.5 %    ABS. NEUTROPHILS 3.7 1.8 - 8.0 K/UL    ABS. LYMPHOCYTES 0.6 (L) 0.8 - 3.5 K/UL    ABS. MONOCYTES 0.6 0.0 - 1.0 K/UL    ABS. EOSINOPHILS 0.3 0.0 - 0.4 K/UL    ABS. BASOPHILS 0.1 0.0 - 0.1 K/UL    ABS. IMM.  GRANS. 0.1 (H) 0.00 - 0.04 K/UL    DF SMEAR SCANNED      RBC COMMENTS ANISOCYTOSIS  1+        RBC COMMENTS MACROCYTOSIS  1+        RBC COMMENTS OVALOCYTES  1+              Radiology:        Current Facility-Administered Medications   Medication Dose Route Frequency    sodium chloride (NS) flush 5-40 mL  5-40 mL IntraVENous Q8H    sodium chloride (NS) flush 5-40 mL  5-40 mL IntraVENous PRN    acetaminophen (TYLENOL) tablet 650 mg  650 mg Oral Q6H PRN    Or    acetaminophen (TYLENOL) suppository 650 mg  650 mg Rectal Q6H PRN    polyethylene glycol (MIRALAX) packet 17 g  17 g Oral DAILY PRN    promethazine (PHENERGAN) tablet 12.5 mg  12.5 mg Oral Q6H PRN    Or    ondansetron (ZOFRAN) injection 4 mg  4 mg IntraVENous Q6H PRN    aspirin delayed-release tablet 81 mg  81 mg Oral QHS    atorvastatin (LIPITOR) tablet 40 mg  40 mg Oral QHS    carbidopa-levodopa (SINEMET)  mg per tablet 2 Tab  2 Tab Oral QID    clopidogreL (PLAVIX) tablet 75 mg  75 mg Oral DAILY AFTER BREAKFAST    DULoxetine (CYMBALTA) capsule 120 mg  120 mg Oral DAILY    pantoprazole (PROTONIX) tablet 40 mg  40 mg Oral ACB    [Held by provider] carvediloL (COREG) tablet 6.25 mg  6.25 mg Oral BID WITH MEALS    bumetanide (BUMEX) injection 2 mg  2 mg IntraVENous DAILY    [Held by provider] hydrALAZINE (APRESOLINE) tablet 37.5 mg  37.5 mg Oral TID    hydrALAZINE (APRESOLINE) 20 mg/mL injection 20 mg  20 mg IntraVENous Q6H PRN          Renitaple Basilio.  SUAD Sue     [facility] [address] [place] [address], [address] [place] [address],[address]   [address]   [phone]

## 2021-03-30 NOTE — PROGRESS NOTES
2101 patient with abnormal heart rate. Patient is sinus ham in comparison to previous hour, she is hypotensive and asymptomatic. Harvey Cody NP notified of change. Order placed for 500mL fluid bolus. 2144 Patient with still ham on monitor, order for stat EKG. Fluid bolus running. 2225 EKG shows sinus ham with left axis deviation. Notified NP Harvey Cody. Will continue to monitor.

## 2021-03-30 NOTE — PROGRESS NOTES
Problem: Mobility Impaired (Adult and Pediatric)  Goal: *Acute Goals and Plan of Care (Insert Text)  Description: FUNCTIONAL STATUS PRIOR TO ADMISSION: Patient was modified independent using a wheelchair and at times a RW for functional mobility. HOME SUPPORT PRIOR TO ADMISSION: The patient lived alone with an aide once a week for 4 hours to provide assistance. Physical Therapy Goals  Initiated 3/30/2021  1. Patient will move from supine to sit and sit to supine  and roll side to side in bed with modified independence within 7 day(s). 2.  Patient will transfer from bed to chair and chair to bed with modified independence using the least restrictive device within 7 day(s). 3.  Patient will perform sit to stand with modified independence within 7 day(s). 4.  Patient will ambulate with modified independence for 75 feet with the least restrictive device within 7 day(s). Outcome: Progressing Towards Goal   PHYSICAL THERAPY EVALUATION  Patient: Mayra Hillman (16 y.o. female)  Date: 3/30/2021  Primary Diagnosis: CHF exacerbation ([facility] [address] [I50.9]        Precautions:   Fall      ASSESSMENT  Based on the objective data described below, the patient presents with decreased mobility and endurance compared to baseline. She has been in SNF rehab multiple times over the last 6 months or so and was only home for 5 days prior to this admission. She was using her wheelchair primarily at home but her goal is to be able to ambulate. With some persuading, she was able to transfer to the EOB with standby assist and increased time. Her standing balance is fair with the walker and she tends to stand with a forward flexed posture. She was able to walk to the [facility] and chair with the walker with min assist.    Highly recommend that she not use the purewick and be up to the [facility] and chair with nursing using the walker.  She does not like rehab, but needs to be more self-sufficient to be sustainably safe at home.    Current Level of Function Impacting Discharge (mobility/balance): min assist for basic transfers with RW    Functional Outcome Measure: The patient scored Total: 45/100 on the Barthel Index which is indicative of significantly impaired ability to care for basic self needs/dependency on others. Other factors to consider for discharge: repeated readmissions and rehab stays     Patient will benefit from skilled therapy intervention to address the above noted impairments. PLAN :  Recommendations and Planned Interventions: bed mobility training, transfer training, gait training, therapeutic exercises, patient and family training/education, and therapeutic activities      Frequency/Duration: Patient will be followed by physical therapy:  5 times a week to address goals. Recommendation for discharge: (in order for the patient to meet his/her long term goals)  To be determined: needs to be better able to care for herself in order to return home, since she is essentially alone. Pt hoping for home, but she may need rehab again if she does not progress steadily enough here. This discharge recommendation:  Has been made in collaboration with the attending provider and/or case management    IF patient discharges home will need the following DME: to be determined (TBD) and possibly BSC for home use         SUBJECTIVE:   Patient stated I want to be able to walk better.     OBJECTIVE DATA SUMMARY:   HISTORY:    Past Medical History:   Diagnosis Date    Anxiety disorder     Atrial fibrillation (HCC)     CAD (coronary artery disease) 2007    stents, CABG x 3v    Carotid stenosis     Cervical stenosis of spinal canal 07/2019    Chronic kidney disease     Cough     CVA (cerebral vascular accident) ([facility] [address] 07/2019    left lacunar infarct at head of caudate    Depression     AND CHRONIC ANXIETY    Diabetes ([facility] [address]     GERD (gastroesophageal reflux disease)     High cholesterol     History of peptic ulcer Bleeding ulcer with increased NSAID use    Hypertension     Left carotid stenosis 07/2019    s/p left CEA with Dr. Raya Villegas, old 2007    PUD (peptic ulcer disease)     Stroke ([facility] [address]     Tremor     Valvular heart disease      Past Surgical History:   Procedure Laterality Date    CARDIAC SURG PROCEDURE UNLIST      CABG X3 VESSEL    HX CAROTID ENDARTERECTOMY  07/2019    HX CORONARY ARTERY BYPASS GRAFT      HX TONSILLECTOMY  1963    TOTAL KNEE ARTHROPLASTY Right 2015       Personal factors and/or comorbidities impacting plan of care: as noted above         EXAMINATION/PRESENTATION/DECISION MAKING:   Critical Behavior:  Neurologic State: Alert  Orientation Level: Oriented X4  Cognition: Follows commands     Hearing: Auditory  Auditory Impairment: None  Skin:  per nursing  Edema: minimal LE edema  Range Of Motion:  AROM: Generally decreased, functional(knee flex contract 5 deg bilat)                       Strength:    Strength: Generally decreased, functional                    Tone & Sensation:   Tone: Normal              Sensation: Impaired(decreased bilateral feet)               Coordination:  Coordination: Generally decreased, functional(some tremors)  Vision:      Functional Mobility:  Bed Mobility:     Supine to Sit: Stand-by assistance; Additional time;Bed Modified(HOB elevated, note patient sleeps in recliner)  Sit to Supine: (up in chair after)     Transfers:  Sit to Stand: Contact guard assistance; Adaptive equipment; Additional time  Stand to Sit: Contact guard assistance; Adaptive equipment; Additional time        Bed to Chair: Minimum assistance; Adaptive equipment; Additional time(needs reminders to avoid twisting to sit)              Balance:   Sitting: Intact; Without support  Standing: Impaired; With support  Standing - Static: Fair(with hands on the walker)  Standing - Dynamic : Fair;Constant support(with hands on the walker)  Ambulation/Gait Training:  Distance (ft): 8 Feet (ft)  Assistive Device: Gait belt;Walker, rolling  Ambulation - Level of Assistance: Minimal assistance; Adaptive equipment; Additional time        Gait Abnormalities: Decreased step clearance;Shuffling gait        Base of Support: Widened;Center of gravity altered(flexed at hips and knees)     Speed/Samira: Shuffled; Slow  Step Length: Right shortened;Left shortened        Interventions: Safety awareness training; Tactile cues; Verbal cues; Visual/Demos            Stairs: Therapeutic Exercises:       Functional Measure:  Barthel Index:    Bathin  Bladder: 5  Bowels: 5  Groomin  Dressin  Feeding: 10  Mobility: 0  Stairs: 0  Toilet Use: 5  Transfer (Bed to Chair and Back): 10  Total: 45/100       The Barthel ADL Index: Guidelines  1. The index should be used as a record of what a patient does, not as a record of what a patient could do. 2. The main aim is to establish degree of independence from any help, physical or verbal, however minor and for whatever reason. 3. The need for supervision renders the patient not independent. 4. A patient's performance should be established using the best available evidence. Asking the patient, friends/relatives and nurses are the usual sources, but direct observation and common sense are also important. However direct testing is not needed. 5. Usually the patient's performance over the preceding 24-48 hours is important, but occasionally longer periods will be relevant. 6. Middle categories imply that the patient supplies over 50 per cent of the effort. 7. Use of aids to be independent is allowed. Jarrell Obrien., Barthel, DRennyW. ([id number]). Functional evaluation: the Barthel Index. [address] [facility] (14)2. FANTA Bacon, Petra Marquez., Ayesha Villalba., Saqib, [address] [place] [address] Ave (). Measuring the change indisability after inpatient rehabilitation; comparison of the responsiveness of the Barthel Index and Functional [place] Measure.  Journal of Neurology, Neurosurgery, and Psychiatry, 66(7), 330-299. NESSA Millan, APARNA Ventura, & Nelia Moss M.A. (2004.) Assessment of post-stroke quality of life in cost-effectiveness studies: The usefulness of the Barthel Index and the EuroQoL-5D. Quality of Life Research, 15, 128-82        Physical Therapy Evaluation Charge Determination   History Examination Presentation Decision-Making   HIGH Complexity :3+ comorbidities / personal factors will impact the outcome/ POC  MEDIUM Complexity : 3 Standardized tests and measures addressing body structure, function, activity limitation and / or participation in recreation  LOW Complexity : Stable, uncomplicated  LOW Complexity : FOTO score of       Based on the above components, the patient evaluation is determined to be of the following complexity level: LOW     Pain Rating:  No complaints of pain    Activity Tolerance:   Fair    After treatment patient left in no apparent distress:   Sitting in chair and Call bell within reach    COMMUNICATION/EDUCATION:   The patients plan of care was discussed with: Occupational therapist, Registered nurse, and Physician. Fall prevention education was provided and the patient/caregiver indicated understanding., Patient/family have participated as able in goal setting and plan of care. , and Patient/family agree to work toward stated goals and plan of care.     Thank you for this referral.  Alfredo Gaviria, PT   Time Calculation: 29 mins

## 2021-03-31 ENCOUNTER — APPOINTMENT (OUTPATIENT)
Dept: NON INVASIVE DIAGNOSTICS | Age: 81
DRG: 246 | End: 2021-03-31
Attending: NURSE PRACTITIONER
Payer: MEDICARE

## 2021-03-31 ENCOUNTER — APPOINTMENT (OUTPATIENT)
Dept: NUCLEAR MEDICINE | Age: 81
DRG: 246 | End: 2021-03-31
Attending: INTERNAL MEDICINE
Payer: MEDICARE

## 2021-03-31 ENCOUNTER — APPOINTMENT (OUTPATIENT)
Dept: GENERAL RADIOLOGY | Age: 81
DRG: 246 | End: 2021-03-31
Attending: NURSE PRACTITIONER
Payer: MEDICARE

## 2021-03-31 ENCOUNTER — APPOINTMENT (OUTPATIENT)
Dept: MRI IMAGING | Age: 81
DRG: 246 | End: 2021-03-31
Attending: INTERNAL MEDICINE
Payer: MEDICARE

## 2021-03-31 LAB
ALBUMIN SERPL-MCNC: 3.6 G/DL (ref 3.5–5)
ALBUMIN/GLOB SERPL: 1 {RATIO} (ref 1.1–2.2)
ALP SERPL-CCNC: 115 U/L (ref 45–117)
ALT SERPL-CCNC: <6 U/L (ref 12–78)
ANION GAP SERPL CALC-SCNC: 12 MMOL/L (ref 5–15)
AST SERPL-CCNC: 13 U/L (ref 15–37)
ATRIAL RATE: 79 BPM
AV R PG: 75.76 MMHG
BASOPHILS # BLD: 0.1 K/UL (ref 0–0.1)
BASOPHILS NFR BLD: 1 % (ref 0–1)
BILIRUB SERPL-MCNC: 0.9 MG/DL (ref 0.2–1)
BUN SERPL-MCNC: 30 MG/DL (ref 6–20)
BUN/CREAT SERPL: 13 (ref 12–20)
CALCIUM SERPL-MCNC: 9 MG/DL (ref 8.5–10.1)
CALCULATED P AXIS, ECG09: 34 DEGREES
CALCULATED R AXIS, ECG10: -27 DEGREES
CALCULATED T AXIS, ECG11: 129 DEGREES
CHLORIDE SERPL-SCNC: 107 MMOL/L (ref 97–108)
CO2 SERPL-SCNC: 18 MMOL/L (ref 21–32)
CREAT SERPL-MCNC: 2.31 MG/DL (ref 0.55–1.02)
DIAGNOSIS, 93000: NORMAL
DIFFERENTIAL METHOD BLD: ABNORMAL
ECHO AO ROOT DIAM: 2.86 CM
ECHO AR MAX VEL PISA: 435.21 CM/S
ECHO AV AREA PEAK VELOCITY: 2.17 CM2
ECHO AV AREA/BSA PEAK VELOCITY: 1.2 CM2/M2
ECHO AV PEAK GRADIENT: 5.47 MMHG
ECHO AV PEAK VELOCITY: 116.89 CM/S
ECHO AV REGURGITANT PHT: 274.26 MS
ECHO EST RA PRESSURE: 8 MMHG
ECHO LA AREA 4C: 28.72 CM2
ECHO LA MAJOR AXIS: 4.27 CM
ECHO LA MINOR AXIS: 2.38 CM
ECHO LA VOL 2C: 86.84 ML (ref 22–52)
ECHO LA VOL 4C: 99.73 ML (ref 22–52)
ECHO LA VOL BP: 98.06 ML (ref 22–52)
ECHO LA VOL/BSA BIPLANE: 54.65 ML/M2 (ref 16–28)
ECHO LA VOLUME INDEX A2C: 48.4 ML/M2 (ref 16–28)
ECHO LA VOLUME INDEX A4C: 55.58 ML/M2 (ref 16–28)
ECHO LV E' LATERAL VELOCITY: 8.03 CM/S
ECHO LV E' SEPTAL VELOCITY: 7.93 CM/S
ECHO LV EDV A2C: 156.21 ML
ECHO LV EDV A4C: 140.89 ML
ECHO LV EDV BP: 149.71 ML (ref 56–104)
ECHO LV EDV INDEX A4C: 78.5 ML/M2
ECHO LV EDV INDEX BP: 83.4 ML/M2
ECHO LV EDV NDEX A2C: 87.1 ML/M2
ECHO LV EJECTION FRACTION A2C: 31 PERCENT
ECHO LV EJECTION FRACTION A4C: 25 PERCENT
ECHO LV EJECTION FRACTION BIPLANE: 27.6 PERCENT (ref 55–100)
ECHO LV ESV A2C: 107.27 ML
ECHO LV ESV A4C: 105.84 ML
ECHO LV ESV BP: 108.33 ML (ref 19–49)
ECHO LV ESV INDEX A2C: 59.8 ML/M2
ECHO LV ESV INDEX A4C: 59 ML/M2
ECHO LV ESV INDEX BP: 60.4 ML/M2
ECHO LV INTERNAL DIMENSION DIASTOLIC: 5.02 CM (ref 3.9–5.3)
ECHO LV INTERNAL DIMENSION SYSTOLIC: 4.28 CM
ECHO LV IVSD: 1.07 CM (ref 0.6–0.9)
ECHO LV MASS 2D: 166.3 G (ref 67–162)
ECHO LV MASS INDEX 2D: 92.7 G/M2 (ref 43–95)
ECHO LV POSTERIOR WALL DIASTOLIC: 0.79 CM (ref 0.6–0.9)
ECHO LVOT DIAM: 1.93 CM
ECHO LVOT PEAK GRADIENT: 2.99 MMHG
ECHO LVOT PEAK VELOCITY: 86.47 CM/S
ECHO MV A VELOCITY: 72.5 CM/S
ECHO MV AREA PHT: 5.62 CM2
ECHO MV E DECELERATION TIME (DT): 134.99 MS
ECHO MV E VELOCITY: 154.88 CM/S
ECHO MV E/A RATIO: 2.14
ECHO MV E/E' LATERAL: 19.29
ECHO MV E/E' RATIO (AVERAGED): 19.41
ECHO MV E/E' SEPTAL: 19.53
ECHO MV PRESSURE HALF TIME (PHT): 39.15 MS
ECHO RIGHT VENTRICULAR SYSTOLIC PRESSURE (RVSP): 82.39 MMHG
ECHO RV INTERNAL DIMENSION: 4.2 CM
ECHO RV TAPSE: 1.14 CM (ref 1.5–2)
ECHO TV REGURGITANT MAX VELOCITY: 431.23 CM/S
ECHO TV REGURGITANT PEAK GRADIENT: 74.39 MMHG
EOSINOPHIL # BLD: 0.6 K/UL (ref 0–0.4)
EOSINOPHIL NFR BLD: 5 % (ref 0–7)
ERYTHROCYTE [DISTWIDTH] IN BLOOD BY AUTOMATED COUNT: 16.8 % (ref 11.5–14.5)
FERRITIN SERPL-MCNC: 119 NG/ML (ref 8–252)
FOLATE SERPL-MCNC: 19.7 NG/ML (ref 5–21)
GLOBULIN SER CALC-MCNC: 3.7 G/DL (ref 2–4)
GLUCOSE BLD STRIP.AUTO-MCNC: 219 MG/DL (ref 65–100)
GLUCOSE SERPL-MCNC: 229 MG/DL (ref 65–100)
HCT VFR BLD AUTO: 36.8 % (ref 35–47)
HGB BLD-MCNC: 11.6 G/DL (ref 11.5–16)
IMM GRANULOCYTES # BLD AUTO: 0.1 K/UL (ref 0–0.04)
IMM GRANULOCYTES NFR BLD AUTO: 1 % (ref 0–0.5)
IRON SATN MFR SERPL: 20 % (ref 20–50)
IRON SERPL-MCNC: 55 UG/DL (ref 35–150)
LYMPHOCYTES # BLD: 1.6 K/UL (ref 0.8–3.5)
LYMPHOCYTES NFR BLD: 14 % (ref 12–49)
MAGNESIUM SERPL-MCNC: 2 MG/DL (ref 1.6–2.4)
MCH RBC QN AUTO: 30.7 PG (ref 26–34)
MCHC RBC AUTO-ENTMCNC: 31.5 G/DL (ref 30–36.5)
MCV RBC AUTO: 97.4 FL (ref 80–99)
MONOCYTES # BLD: 1 K/UL (ref 0–1)
MONOCYTES NFR BLD: 8 % (ref 5–13)
NEUTS SEG # BLD: 8.7 K/UL (ref 1.8–8)
NEUTS SEG NFR BLD: 72 % (ref 32–75)
NRBC # BLD: 0 K/UL (ref 0–0.01)
NRBC BLD-RTO: 0 PER 100 WBC
P-R INTERVAL, ECG05: 180 MS
PLATELET # BLD AUTO: 322 K/UL (ref 150–400)
PMV BLD AUTO: 8.2 FL (ref 8.9–12.9)
POTASSIUM SERPL-SCNC: 4.3 MMOL/L (ref 3.5–5.1)
PROT SERPL-MCNC: 7.3 G/DL (ref 6.4–8.2)
Q-T INTERVAL, ECG07: 468 MS
QRS DURATION, ECG06: 154 MS
QTC CALCULATION (BEZET), ECG08: 536 MS
RBC # BLD AUTO: 3.78 M/UL (ref 3.8–5.2)
SERVICE CMNT-IMP: ABNORMAL
SODIUM SERPL-SCNC: 137 MMOL/L (ref 136–145)
TIBC SERPL-MCNC: 280 UG/DL (ref 250–450)
TROPONIN I SERPL-MCNC: 0.05 NG/ML
TROPONIN I SERPL-MCNC: 0.09 NG/ML
VENTRICULAR RATE, ECG03: 79 BPM
VIT B12 SERPL-MCNC: 1740 PG/ML (ref 193–986)
WBC # BLD AUTO: 12 K/UL (ref 3.6–11)

## 2021-03-31 PROCEDURE — 80053 COMPREHEN METABOLIC PANEL: CPT

## 2021-03-31 PROCEDURE — 93306 TTE W/DOPPLER COMPLETE: CPT

## 2021-03-31 PROCEDURE — 77030038269 HC DRN EXT URIN PURWCK BARD -A

## 2021-03-31 PROCEDURE — 71045 X-RAY EXAM CHEST 1 VIEW: CPT

## 2021-03-31 PROCEDURE — 2709999900 HC NON-CHARGEABLE SUPPLY

## 2021-03-31 PROCEDURE — 82962 GLUCOSE BLOOD TEST: CPT

## 2021-03-31 PROCEDURE — 84484 ASSAY OF TROPONIN QUANT: CPT

## 2021-03-31 PROCEDURE — 99233 SBSQ HOSP IP/OBS HIGH 50: CPT | Performed by: SPECIALIST

## 2021-03-31 PROCEDURE — 83735 ASSAY OF MAGNESIUM: CPT

## 2021-03-31 PROCEDURE — 74011250637 HC RX REV CODE- 250/637: Performed by: SPECIALIST

## 2021-03-31 PROCEDURE — 93306 TTE W/DOPPLER COMPLETE: CPT | Performed by: SPECIALIST

## 2021-03-31 PROCEDURE — 82607 VITAMIN B-12: CPT

## 2021-03-31 PROCEDURE — A9540 TC99M MAA: HCPCS

## 2021-03-31 PROCEDURE — 74011000250 HC RX REV CODE- 250: Performed by: NURSE PRACTITIONER

## 2021-03-31 PROCEDURE — 65660000000 HC RM CCU STEPDOWN

## 2021-03-31 PROCEDURE — 82728 ASSAY OF FERRITIN: CPT

## 2021-03-31 PROCEDURE — 74011250637 HC RX REV CODE- 250/637: Performed by: INTERNAL MEDICINE

## 2021-03-31 PROCEDURE — 36415 COLL VENOUS BLD VENIPUNCTURE: CPT

## 2021-03-31 PROCEDURE — 83540 ASSAY OF IRON: CPT

## 2021-03-31 PROCEDURE — 94760 N-INVAS EAR/PLS OXIMETRY 1: CPT

## 2021-03-31 PROCEDURE — 93005 ELECTROCARDIOGRAM TRACING: CPT

## 2021-03-31 PROCEDURE — 74185 MRA ABD W OR W/O CNTRST: CPT

## 2021-03-31 PROCEDURE — 77010033678 HC OXYGEN DAILY

## 2021-03-31 PROCEDURE — 85025 COMPLETE CBC W/AUTO DIFF WBC: CPT

## 2021-03-31 PROCEDURE — 82746 ASSAY OF FOLIC ACID SERUM: CPT

## 2021-03-31 RX ORDER — LORAZEPAM 2 MG/ML
1 INJECTION INTRAMUSCULAR ONCE
Status: ACTIVE | OUTPATIENT
Start: 2021-03-31 | End: 2021-04-01

## 2021-03-31 RX ORDER — AMLODIPINE BESYLATE 5 MG/1
5 TABLET ORAL DAILY
Status: DISCONTINUED | OUTPATIENT
Start: 2021-04-01 | End: 2021-04-05

## 2021-03-31 RX ORDER — AMLODIPINE BESYLATE 5 MG/1
2.5 TABLET ORAL DAILY
Status: DISCONTINUED | OUTPATIENT
Start: 2021-03-31 | End: 2021-03-31

## 2021-03-31 RX ORDER — CARVEDILOL 3.12 MG/1
3.12 TABLET ORAL 2 TIMES DAILY WITH MEALS
Status: DISCONTINUED | OUTPATIENT
Start: 2021-03-31 | End: 2021-04-01

## 2021-03-31 RX ADMIN — CARBIDOPA AND LEVODOPA 2 TABLET: 25; 100 TABLET ORAL at 13:47

## 2021-03-31 RX ADMIN — CARBIDOPA AND LEVODOPA 2 TABLET: 25; 100 TABLET ORAL at 18:15

## 2021-03-31 RX ADMIN — DULOXETINE HYDROCHLORIDE 120 MG: 60 CAPSULE, DELAYED RELEASE ORAL at 09:00

## 2021-03-31 RX ADMIN — Medication 10 ML: at 07:27

## 2021-03-31 RX ADMIN — CARBIDOPA AND LEVODOPA 2 TABLET: 25; 100 TABLET ORAL at 23:18

## 2021-03-31 RX ADMIN — AMLODIPINE BESYLATE 2.5 MG: 5 TABLET ORAL at 09:00

## 2021-03-31 RX ADMIN — ASPIRIN 81 MG: 81 TABLET, COATED ORAL at 23:18

## 2021-03-31 RX ADMIN — HYDRALAZINE HYDROCHLORIDE 37.5 MG: 25 TABLET, FILM COATED ORAL at 16:10

## 2021-03-31 RX ADMIN — HYDRALAZINE HYDROCHLORIDE 37.5 MG: 25 TABLET, FILM COATED ORAL at 09:00

## 2021-03-31 RX ADMIN — CARBIDOPA AND LEVODOPA 2 TABLET: 25; 100 TABLET ORAL at 09:00

## 2021-03-31 RX ADMIN — Medication 10 ML: at 13:47

## 2021-03-31 RX ADMIN — Medication 10 ML: at 23:18

## 2021-03-31 RX ADMIN — HYDRALAZINE HYDROCHLORIDE 37.5 MG: 25 TABLET, FILM COATED ORAL at 23:18

## 2021-03-31 RX ADMIN — CARVEDILOL 3.12 MG: 3.12 TABLET, FILM COATED ORAL at 18:15

## 2021-03-31 RX ADMIN — PANTOPRAZOLE SODIUM 40 MG: 40 TABLET, DELAYED RELEASE ORAL at 07:26

## 2021-03-31 RX ADMIN — BUMETANIDE 2 MG: 0.25 INJECTION INTRAMUSCULAR; INTRAVENOUS at 09:00

## 2021-03-31 RX ADMIN — ATORVASTATIN CALCIUM 40 MG: 40 TABLET, FILM COATED ORAL at 23:18

## 2021-03-31 NOTE — PROGRESS NOTES
Pt called out in distress. Wanted me and another RN to call 911. Pt was diaphoretic. Satting in the 80's at 3l. Stating her chest hurt. Had to increase oxygen to 12L to get 90% sat. Contacted Dell BORJAS. He ordered blood sugar test. The result was 210. He ordered a chest xray and labs, CMP and CBC and Mag. Will cont to renetta. Not was written by pt bedside as pt is too anxious for me too leave yet. She has coughed up a couple pinkish redish spots on tissue within the past few minutes. Sedrick is new.

## 2021-03-31 NOTE — PROGRESS NOTES
[facility]   [address] [address], [facility] [address], [facility]  Phone: [phone]   [phone]       Nephrology Progress Note  Wever Flattery     [birth year]     [id number]  Date of Admission : 3/29/2021  03/31/21    CC: Follow up for ARF    Assessment and Plan   MORENO on CKD   - Etiology : CRS vs progression of CKD   - renal US showing progressive Left renal atrophy --> concern for BATOOL. Ordered MRA  - continue w/ IV Bumex 2 mg daily   - labs daily   - strict I. O      CKD 3  - baseline Cr ~ 1.4-1.5 mg/dl   - renal US: progressive Left > R renal atrophy. Benign cysts   - presumed to be 2/2 DM, HTN      Mild acidosis :  - 2/2 CRF. Watch for now      Pulm edema/ Effusions/ CHF  CAD s/p CABG 2015  - diuresis as above   - Echo pending   - cardiology following   - stress test today      Tremor   - claims anxiety. ? Parkinsons     NCNC Anemia   - ? 2/2 CKD   - normal iron, B12, folate   - f/u Myeloma screen      PVD  S/P Left CEA         Care Plan discussed with:  Pt        Interval History:  Episode of SOB, BP spike at 5 am   She completed stress test this morning   Cr down a little   SOB improved     Review of Systems: A comprehensive review of systems was negative except for that written in the HPI.     Current Medications:   Current Facility-Administered Medications   Medication Dose Route Frequency    amLODIPine (NORVASC) tablet 2.5 mg  2.5 mg Oral DAILY    sodium chloride (NS) flush 5-40 mL  5-40 mL IntraVENous Q8H    sodium chloride (NS) flush 5-40 mL  5-40 mL IntraVENous PRN    acetaminophen (TYLENOL) tablet 650 mg  650 mg Oral Q6H PRN    Or    acetaminophen (TYLENOL) suppository 650 mg  650 mg Rectal Q6H PRN    polyethylene glycol (MIRALAX) packet 17 g  17 g Oral DAILY PRN    promethazine (PHENERGAN) tablet 12.5 mg  12.5 mg Oral Q6H PRN    Or    ondansetron (ZOFRAN) injection 4 mg  4 mg IntraVENous Q6H PRN    aspirin delayed-release tablet 81 mg  81 mg Oral QHS    atorvastatin (LIPITOR) tablet 40 mg  40 mg Oral QHS    carbidopa-levodopa (SINEMET)  mg per tablet 2 Tab  2 Tab Oral QID    [Held by provider] clopidogreL (PLAVIX) tablet 75 mg  75 mg Oral DAILY AFTER BREAKFAST    DULoxetine (CYMBALTA) capsule 120 mg  120 mg Oral DAILY    pantoprazole (PROTONIX) tablet 40 mg  40 mg Oral ACB    bumetanide (BUMEX) injection 2 mg  2 mg IntraVENous DAILY    hydrALAZINE (APRESOLINE) tablet 37.5 mg  37.5 mg Oral TID    hydrALAZINE (APRESOLINE) 20 mg/mL injection 20 mg  20 mg IntraVENous Q6H PRN      No Known Allergies    Objective:  Vitals:    Vitals:    03/31/21 0507 03/31/21 0727 03/31/21 0827 03/31/21 1156   BP: (!) 183/84  (!) 162/64 (!) 162/66   Pulse: (!) 116  69 85   Resp: 19  18 18   Temp: 97.8 °F (36.6 °C)  97.6 °F (36.4 °C) 97.8 °F (36.6 °C)   SpO2: 90%  93% 94%   Weight:  72.3 kg (159 lb 6.3 oz)     Height:         Intake and Output:  03/31 0701 - 03/31 1900  In: 222 [P.O.:222]  Out: -   03/29 1901 - 03/31 0700  In: 1102 [P.O.:1102]  Out: 1489 [Urine:1650]    Physical Examination:  General:  Appears stated age   HEENT: PERRL,  ++ Pallor , No Icterus  Neck: Supple,no mass palpable  Lungs : b/l rales   CVS: RRR, S1 S2 normal, No murmur   Abdomen: Soft, NT, BS +  Extremities: 1-2+ Edema, tender to touch   Skin: discoloration in both legs . MS: No joint swelling, erythema, warmth  Neurologic: non focal, AAO x 3  Psych: anxious     []    High complexity decision making was performed  []    Patient is at high-risk of decompensation with multiple organ involvement    Lab Data Personally Reviewed: I have reviewed all the pertinent labs, microbiology data and radiology studies during assessment.     Recent Labs     03/31/21  0523 03/30/21  0259 03/29/21  1352    136 140   K 4.3 4.6 5.5*    109* 108   CO2 18* 19* 20*   * 135* 61*   BUN 30* 33* 31*   CREA 2.31* 2.41* 2.54*   CA 9.0 8.0* 8.5   MG 2.0  --   --    ALB 3.6  --  3.3*   ALT <6*  --  26     Recent Labs 03/31/21  0523 03/30/21  0259 03/29/21  1352   WBC 12.0* 5.4 7.9   HGB 11.6 8.8* 10.4*   HCT 36.8 27.7* 33.0*    265 311     No results found for: SDES  Lab Results   Component Value Date/Time    Culture result: MRSA NOT PRESENT 12/26/2018 09:29 PM    Culture result:  12/26/2018 09:29 PM         Screening of patient nares for MRSA is for surveillance purposes and, if positive, to facilitate isolation considerations in high risk settings. It is not intended for automatic decolonization interventions per se as regimens are not sufficiently effective to warrant routine use. Culture result: MRSA PRESENT 02/09/2015 06:30 PM    Culture result:  02/09/2015 06:30 PM         Screening of patient nares for MRSA is for surveillance purposes and, if positive, to facilitate isolation considerations in high risk settings. It is not intended for automatic decolonization interventions per se as regimens are not sufficiently effective to warrant routine use. Culture result: MRSA PRESENT 09/09/2014 05:02 PM    Culture result:  09/09/2014 05:02 PM         Screening of patient nares for MRSA is for surveillance purposes and, if positive, to facilitate isolation considerations in high risk settings. It is not intended for automatic decolonization interventions per se as regimens are not sufficiently effective to warrant routine use.      Recent Results (from the past 24 hour(s))   VITAMIN B12    Collection Time: 03/31/21  3:55 AM   Result Value Ref Range    Vitamin B12 1,740 (H) 193 - 986 pg/mL   FOLATE    Collection Time: 03/31/21  3:55 AM   Result Value Ref Range    Folate 19.7 5.0 - 21.0 ng/mL   IRON PROFILE    Collection Time: 03/31/21  3:55 AM   Result Value Ref Range    Iron 55 35 - 150 ug/dL    TIBC 280 250 - 450 ug/dL    Iron % saturation 20 20 - 50 %   FERRITIN    Collection Time: 03/31/21  3:55 AM   Result Value Ref Range    Ferritin 119 8 - 252 NG/ML   GLUCOSE, POC    Collection Time: 03/31/21  5:19 AM   Result Value Ref Range    Glucose (POC) 219 (H) 65 - 100 mg/dL    Performed by Clem Billings    CBC WITH AUTOMATED DIFF    Collection Time: 03/31/21  5:23 AM   Result Value Ref Range    WBC 12.0 (H) 3.6 - 11.0 K/uL    RBC 3.78 (L) 3.80 - 5.20 M/uL    HGB 11.6 11.5 - 16.0 g/dL    HCT 36.8 35.0 - 47.0 %    MCV 97.4 80.0 - 99.0 FL    MCH 30.7 26.0 - 34.0 PG    MCHC 31.5 30.0 - 36.5 g/dL    RDW 16.8 (H) 11.5 - 14.5 %    PLATELET 461 159 - 981 K/uL    MPV 8.2 (L) 8.9 - 12.9 FL    NRBC 0.0 0  WBC    ABSOLUTE NRBC 0.00 0.00 - 0.01 K/uL    NEUTROPHILS 72 32 - 75 %    LYMPHOCYTES 14 12 - 49 %    MONOCYTES 8 5 - 13 %    EOSINOPHILS 5 0 - 7 %    BASOPHILS 1 0 - 1 %    IMMATURE GRANULOCYTES 1 (H) 0.0 - 0.5 %    ABS. NEUTROPHILS 8.7 (H) 1.8 - 8.0 K/UL    ABS. LYMPHOCYTES 1.6 0.8 - 3.5 K/UL    ABS. MONOCYTES 1.0 0.0 - 1.0 K/UL    ABS. EOSINOPHILS 0.6 (H) 0.0 - 0.4 K/UL    ABS. BASOPHILS 0.1 0.0 - 0.1 K/UL    ABS. IMM. GRANS. 0.1 (H) 0.00 - 0.04 K/UL    DF AUTOMATED     METABOLIC PANEL, COMPREHENSIVE    Collection Time: 03/31/21  5:23 AM   Result Value Ref Range    Sodium 137 136 - 145 mmol/L    Potassium 4.3 3.5 - 5.1 mmol/L    Chloride 107 97 - 108 mmol/L    CO2 18 (L) 21 - 32 mmol/L    Anion gap 12 5 - 15 mmol/L    Glucose 229 (H) 65 - 100 mg/dL    BUN 30 (H) 6 - 20 MG/DL    Creatinine 2.31 (H) 0.55 - 1.02 MG/DL    BUN/Creatinine ratio 13 12 - 20      GFR est AA 25 (L) >60 ml/min/1.73m2    GFR est non-AA 20 (L) >60 ml/min/1.73m2    Calcium 9.0 8.5 - 10.1 MG/DL    Bilirubin, total 0.9 0.2 - 1.0 MG/DL    ALT (SGPT) <6 (L) 12 - 78 U/L    AST (SGOT) 13 (L) 15 - 37 U/L    Alk.  phosphatase 115 45 - 117 U/L    Protein, total 7.3 6.4 - 8.2 g/dL    Albumin 3.6 3.5 - 5.0 g/dL    Globulin 3.7 2.0 - 4.0 g/dL    A-G Ratio 1.0 (L) 1.1 - 2.2     MAGNESIUM    Collection Time: 03/31/21  5:23 AM   Result Value Ref Range    Magnesium 2.0 1.6 - 2.4 mg/dL   EKG, 12 LEAD, INITIAL    Collection Time: 03/31/21  8:08 AM   Result Value Ref Range Ventricular Rate 79 BPM    Atrial Rate 79 BPM    P-R Interval 180 ms    QRS Duration 154 ms    Q-T Interval 468 ms    QTC Calculation (Bezet) 536 ms    Calculated P Axis 34 degrees    Calculated R Axis -27 degrees    Calculated T Axis 129 degrees    Diagnosis       Normal sinus rhythm  Left bundle branch block  Abnormal ECG  When compared with ECG of 30-MAR-2021 09:23,  Vent. rate has increased BY  27 BPM  QRS duration has increased  Confirmed by Poncho Reagan MD, Edgard Pyle ([id number]) on 3/31/2021 9:11:21 AM     TROPONIN I    Collection Time: 03/31/21  8:59 AM   Result Value Ref Range    Troponin-I, Qt. 0.05 (H) <0.05 ng/mL           Total time spent with patient:  xxx   min. Care Plan discussed with:  Patient     Family      RN      Consulting Physician [id number] [facility],         I have reviewed the flowsheets. Chart and Pertinent Notes have been reviewed. No change in PMH ,family and social history from Consult note.       Fay Shah MD

## 2021-03-31 NOTE — PROGRESS NOTES
Occupational Therapy    Patient chart reviewed and discussed with RN - patient to have V/Q scan today to rule out PE. Will defer and continue to follow as able and appropriate. Thank you.   Cailin Cooper, RONALDR/L

## 2021-03-31 NOTE — PROGRESS NOTES
Cardiology Progress Note            Admit Date: 3/29/2021  Admit Diagnosis: CHF exacerbation ([facility] [address] [I50.9]  Date: 3/31/2021     Time: 8:44 AM    Subjective:   Episode of chest pain/heaviness early this a. m. Also had acute onset of SOB, diaphoresis  And O2 sat dropped to 80% on 3L NC. Sats initially increased to 90% on 12 Liters. Her BP was markedly elevated. Nurse reports pt had productive cough with some pinked tinged sputum. CXR showed mild pulmonary edema, left pleural effusion, no significant change from prior xray. EKG shows NSR, LBBB. Troponin pending. Now on rounds, pt O2 sat 93% and she states her breathing is better. No chest pain currently. Assessment and Plan     1. Acute hypoxic respiratory insufficiency   -CXR result noted   -Bumex 2 mg IV daily   -Acute onset of concern for PTE, agree with V/Q    2. Chest pain:   -currently resolved   -EKG reviewed by Dr. Morgan Mann- NSR, LBBB. -Echo   -Check troponin - 0.05, will trend.   -Consider stress testing pending troponin but defer stress test for now given need for V/Q.    -CP may be related to possible PTE, vs HTN urgency vs CAD in differential    3. PAF: recurrent   -No further PAF on telemetry review   -Now NSR-sinus tachycardia with  prolonged QtC d/t LBBB pattern   -experienced bradycardia with Coreg on 3/30- now off coreg. Suspect SSS   -TSH 3.22   -Echo:Last echo 5/4/20 EF 55-60%, mod MR,mild AI, mildly reduced RV function   [id number] vasc= at least 8.not on [address] PTA. Anemia of concern but improved today. 4. Fluid overload, BLE edema, pulmonary edema   -BLE Edema improved. - May be 2/2  suspect pulmonary HTN/mild RV dysfunction and renal dysfunction but checking echo. -BLE US with no evidence of DVT   -Repeat echo pending. -BP controlled. -Continue IV Bumex 2 mg daily, monitor renal function. 5. HTN :    -BP markedly elevated this a.m. -Restart home amlodipine this a.m.. -Off home hydralazine. 6. MORENO on CKD    -Creatinine slight trend down today. 2.31   -Nephrology following      7. Anemia, normocytic   -Hgb 11.6 today, from 8.8 ? accuracy   -Check stool OB    8. CAD              - s/p CABG x 3 2015,  Stent 2007              - Coreg, Lipitor,, ASA- stop Plavix. -Nuclear stress 5/5/2020: No ischemia, possile small distal anterior infarct EF 53%  9. Prolonged Qtc : 536 today   -d/w Dr. Earnest Healy- prolonged QtC due to LBBB pattern    10. PHTN               - PAS on echo 50 mmHg on echo 5/2020              - Moderate   -Repeat echo  11. MR              - Moderate on echo in 5/2020              - moderate LA dilation on echo    Other comorbids:  12. Carotid dz              - s/p CEA on left              - Plavix, Lipitor- holding plavix. 13. HLD:   -LDL 83, HDL 52 (9/2020)             - Lipitor   14. DM              - A1C 5.7              - per Primary team  15. 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 Initial troponin trivially elevated- EKG with NSR, LBBB. Uncertain etiology of symptoms but agree with need for V/Q scan to evaluate for PE. Chest pain has resolved and SOB improved. CXR with some edema but unchanged. Will trend troponin. Echo pending. Evelyne Womack. SUAD Sue 3/31/2021 6090  Cardiology Attending:Patient seen and examined. I agree with NP assessment and plans. Chest pain with desaturation and blood tinged sputum suggest PE, for VQ scan.     Henry Camarena MD 3/31/2021 10:23 AM              PMH  Past Medical History:   Diagnosis Date    Anxiety disorder     Atrial fibrillation ([facility] [address]     CAD (coronary artery disease) 2007    stents, CABG x 3v    Carotid stenosis     Cervical stenosis of spinal canal 07/2019    Chronic kidney disease     Cough     CVA (cerebral vascular accident) ([facility] [address] 07/2019    left lacunar infarct at head of caudate    Depression     AND CHRONIC ANXIETY    Diabetes (HCC)     GERD (gastroesophageal reflux disease)     High cholesterol     History of peptic ulcer     Bleeding ulcer with increased NSAID use    Hypertension     Left carotid stenosis 2019    s/p left CEA with Dr. Yancy Saravia, old     PUD (peptic ulcer disease)     Stroke ([facility] [address]     Tremor     Valvular heart disease       Social Hx  Social History     Socioeconomic History    Marital status: SINGLE     Spouse name: Not on file    Number of children: Not on file    Years of education: Not on file    Highest education level: Not on file   Occupational History    Occupation: Retired realestate/teacher   Social Needs    Financial resource strain: Not on file    Food insecurity     Worry: Not on file     Inability: Not on file   [employer] needs     Medical: Not on file     Non-medical: Not on file   Tobacco Use    Smoking status: Former Smoker     Packs/day: 0.25     Years: 5.00     Pack years: 1.25     Types: Cigarettes     Quit date:      Years since quittin.2    Smokeless tobacco: Never Used   Substance and Sexual Activity    Alcohol use:  Yes     Alcohol/week: 0.0 standard drinks     Comment: Rare    Drug use: No    Sexual activity: Not on file   Lifestyle    Physical activity     Days per week: Not on file     Minutes per session: Not on file    Stress: Not on file   Relationships    Social connections     Talks on phone: Not on file     Gets together: Not on file     Attends Spiritism service: Not on file     Active member of club or organization: Not on file     Attends meetings of clubs or organizations: Not on file     Relationship status: Not on file    Intimate partner violence     Fear of current or ex partner: Not on file     Emotionally abused: Not on file     Physically abused: Not on file     Forced sexual activity: Not on file   Other Topics Concern    Not on file Social History Narrative    Lives in [facility]       Objective:   Physical Exam:                Visit Vitals  BP (!) 162/64 (BP 1 Location: Right upper arm, BP Patient Position: Sitting)   Pulse 69   Temp 97.6 °F (36.4 °C)   Resp 18   Ht 5' 5\" (1.651 m)   Wt 159 lb 6.3 oz (72.3 kg)   SpO2 93%   BMI 26.52 kg/m²          General Appearance:   Well developed, pale, alert and oriented x 3, and   individual in no acute distress. Ears/Nose/Mouth/Throat:    Hearing grossly normal.         Neck:  Supple. Chest:    Lungs with right basilar crackles, diminished left base. Cardiovascular:  Regular rate and rhythm, S1, S2 normal, no murmur. Abdomen:    Soft, non-tender, bowel sounds are active. Extremities:  trace edema bilaterally. Skin:  Warm and dry. Pale. Telemetry: NSR with BBB currently.  No PAF            Data Review:    Labs:    Recent Results (from the past 24 hour(s))   URINALYSIS W/MICROSCOPIC    Collection Time: 03/30/21 11:02 AM   Result Value Ref Range    Color YELLOW/STRAW      Appearance CLOUDY (A) CLEAR      Specific gravity 1.008 1.003 - 1.030      pH (UA) 5.0 5.0 - 8.0      Protein 30 (A) NEG mg/dL    Glucose Negative NEG mg/dL    Ketone Negative NEG mg/dL    Bilirubin Negative NEG      Blood TRACE (A) NEG      Urobilinogen 0.2 0.2 - 1.0 EU/dL    Nitrites Negative NEG      Leukocyte Esterase LARGE (A) NEG      WBC >100 (H) 0 - 4 /hpf    RBC 5-10 0 - 5 /hpf    Epithelial cells FEW FEW /lpf    Bacteria 4+ (A) NEG /hpf    Hyaline cast 0-2 0 - 5 /lpf   VITAMIN B12    Collection Time: 03/31/21  3:55 AM   Result Value Ref Range    Vitamin B12 1,740 (H) 193 - 986 pg/mL   FOLATE    Collection Time: 03/31/21  3:55 AM   Result Value Ref Range    Folate 19.7 5.0 - 21.0 ng/mL   IRON PROFILE    Collection Time: 03/31/21  3:55 AM   Result Value Ref Range    Iron 55 35 - 150 ug/dL    TIBC 280 250 - 450 ug/dL    Iron % saturation 20 20 - 50 %   FERRITIN    Collection Time: 03/31/21  3:55 AM   Result Value Ref Range    Ferritin 119 8 - 252 NG/ML   GLUCOSE, POC    Collection Time: 03/31/21  5:19 AM   Result Value Ref Range    Glucose (POC) 219 (H) 65 - 100 mg/dL    Performed by Nic Billings    CBC WITH AUTOMATED DIFF    Collection Time: 03/31/21  5:23 AM   Result Value Ref Range    WBC 12.0 (H) 3.6 - 11.0 K/uL    RBC 3.78 (L) 3.80 - 5.20 M/uL    HGB 11.6 11.5 - 16.0 g/dL    HCT 36.8 35.0 - 47.0 %    MCV 97.4 80.0 - 99.0 FL    MCH 30.7 26.0 - 34.0 PG    MCHC 31.5 30.0 - 36.5 g/dL    RDW 16.8 (H) 11.5 - 14.5 %    PLATELET 954 928 - 557 K/uL    MPV 8.2 (L) 8.9 - 12.9 FL    NRBC 0.0 0  WBC    ABSOLUTE NRBC 0.00 0.00 - 0.01 K/uL    NEUTROPHILS 72 32 - 75 %    LYMPHOCYTES 14 12 - 49 %    MONOCYTES 8 5 - 13 %    EOSINOPHILS 5 0 - 7 %    BASOPHILS 1 0 - 1 %    IMMATURE GRANULOCYTES 1 (H) 0.0 - 0.5 %    ABS. NEUTROPHILS 8.7 (H) 1.8 - 8.0 K/UL    ABS. LYMPHOCYTES 1.6 0.8 - 3.5 K/UL    ABS. MONOCYTES 1.0 0.0 - 1.0 K/UL    ABS. EOSINOPHILS 0.6 (H) 0.0 - 0.4 K/UL    ABS. BASOPHILS 0.1 0.0 - 0.1 K/UL    ABS. IMM. GRANS. 0.1 (H) 0.00 - 0.04 K/UL    DF AUTOMATED     METABOLIC PANEL, COMPREHENSIVE    Collection Time: 03/31/21  5:23 AM   Result Value Ref Range    Sodium 137 136 - 145 mmol/L    Potassium 4.3 3.5 - 5.1 mmol/L    Chloride 107 97 - 108 mmol/L    CO2 18 (L) 21 - 32 mmol/L    Anion gap 12 5 - 15 mmol/L    Glucose 229 (H) 65 - 100 mg/dL    BUN 30 (H) 6 - 20 MG/DL    Creatinine 2.31 (H) 0.55 - 1.02 MG/DL    BUN/Creatinine ratio 13 12 - 20      GFR est AA 25 (L) >60 ml/min/1.73m2    GFR est non-AA 20 (L) >60 ml/min/1.73m2    Calcium 9.0 8.5 - 10.1 MG/DL    Bilirubin, total 0.9 0.2 - 1.0 MG/DL    ALT (SGPT) <6 (L) 12 - 78 U/L    AST (SGOT) 13 (L) 15 - 37 U/L    Alk.  phosphatase 115 45 - 117 U/L    Protein, total 7.3 6.4 - 8.2 g/dL    Albumin 3.6 3.5 - 5.0 g/dL    Globulin 3.7 2.0 - 4.0 g/dL    A-G Ratio 1.0 (L) 1.1 - 2.2     MAGNESIUM    Collection Time: 03/31/21  5:23 AM   Result Value Ref Range    Magnesium 2.0 1.6 - 2.4 mg/dL   EKG, 12 LEAD, INITIAL    Collection Time: 03/31/21  8:08 AM   Result Value Ref Range    Ventricular Rate 79 BPM    Atrial Rate 79 BPM    P-R Interval 180 ms    QRS Duration 154 ms    Q-T Interval 468 ms    QTC Calculation (Bezet) 536 ms    Calculated P Axis 34 degrees    Calculated R Axis -27 degrees    Calculated T Axis 129 degrees    Diagnosis       Normal sinus rhythm  Left bundle branch block  Abnormal ECG  When compared with ECG of 30-MAR-2021 09:23,  Vent. rate has increased BY  27 BPM  QRS duration has increased  Confirmed by Leeanna Kinney MD, [facility] ([id number]) on 3/31/2021 9:11:21 AM            Radiology:        Current Facility-Administered Medications   Medication Dose Route Frequency    amLODIPine (NORVASC) tablet 2.5 mg  2.5 mg Oral DAILY    sodium chloride (NS) flush 5-40 mL  5-40 mL IntraVENous Q8H    sodium chloride (NS) flush 5-40 mL  5-40 mL IntraVENous PRN    acetaminophen (TYLENOL) tablet 650 mg  650 mg Oral Q6H PRN    Or    acetaminophen (TYLENOL) suppository 650 mg  650 mg Rectal Q6H PRN    polyethylene glycol (MIRALAX) packet 17 g  17 g Oral DAILY PRN    promethazine (PHENERGAN) tablet 12.5 mg  12.5 mg Oral Q6H PRN    Or    ondansetron (ZOFRAN) injection 4 mg  4 mg IntraVENous Q6H PRN    aspirin delayed-release tablet 81 mg  81 mg Oral QHS    atorvastatin (LIPITOR) tablet 40 mg  40 mg Oral QHS    carbidopa-levodopa (SINEMET)  mg per tablet 2 Tab  2 Tab Oral QID    [Held by provider] clopidogreL (PLAVIX) tablet 75 mg  75 mg Oral DAILY AFTER BREAKFAST    DULoxetine (CYMBALTA) capsule 120 mg  120 mg Oral DAILY    pantoprazole (PROTONIX) tablet 40 mg  40 mg Oral ACB    bumetanide (BUMEX) injection 2 mg  2 mg IntraVENous DAILY    hydrALAZINE (APRESOLINE) tablet 37.5 mg  37.5 mg Oral TID    hydrALAZINE (APRESOLINE) 20 mg/mL injection 20 mg  20 mg IntraVENous Q6H PRN          [place].  SUAD Sue     [facility] [address] [place] [address], [address] [facility]   [phone]

## 2021-03-31 NOTE — PROGRESS NOTES
Hospitalist Progress Note  Carmenza Lujan MD  Answering service: [phone] from in house phone      Date of Service:  3/31/2021  NAME:  Melonie Pacheco  :  1940  MRN:  [id number]    Admission Summary:   80F p/w SOB, edema  Interval history / Subjective:   Patient seen and examined at bedside, had eventful early morning, 5AM suddenly felt difficulty breathing, heaviness on her chest, was severely diaphoretic. No chest pain per se, no n/v. Her sats dropped temporarily- required high levels of oxygen, was tachycardic. Now feels better down to 4-5L O2. Assessment & Plan:     Acute on chronic CHF: - pro BNP 6847- CXR: Pulmonary edema. Cardiomegaly  - telemetry- rapid covid negative. Normal D dimer. IV diuresis, monitor lytes. - cardiology following- restarted home meds aspirin, statin, coreg- strict I&Os, daily weight   - LE duplex: -ve for DVTs. TTE pending. PT/OT eval    #. Episode of diaphoresis/sob: 3/31AM. Possible MI vs PE vs panic attack. ..  - Labs pending, add Trops- trend, check EKG, keep on tele,   - unable to get CTA 'given severe CKD'. Will get V/Q scan to evaluate for ? PE. Afib with RVR-  Rate controlled now. c/w coreg- not on AC per her primary cardiology     MORENO on CKD stage 3- Nephrology following: monitor Cr with diuresis  #. Metabolic Acidosis: mild likely related to above. HTN - BP elevated. C/w coreg, hydralazine. IV hydralazine prn  Parkinson's - sinemet   Anemia of chronic disease - hb stable. monitor  Hx CAD s/p CABG - denied chest pain.  C/w aspirin, statin coreg  Hx Carotid stenois s/p CEA on left - c/w Plavix, Lipitor  Hx CVA     Code status: DNR- mPOA niece  DVT prophylaxis: Lovenox  Care Plan discussed with: Patient/Family and Nurse  Disposition: TBD 1-2days     Hospital Problems  Date Reviewed: 2020          Codes Class Noted POA    CHF exacerbation ([facility] [address] ICD-10-CM: I50.9  ICD-9-CM: 428.0 9/25/2020 Unknown            Review of Systems:   Pertinent items are mentioned in interval history. Vital Signs:    Last 24hrs VS reviewed since prior progress note. Most recent are:  Visit Vitals  BP (!) 183/84 (BP 1 Location: Right upper arm, BP Patient Position: At rest)   Pulse (!) 116   Temp 97.8 °F (36.6 °C)   Resp 19   Ht 5' 5\" (1.651 m)   Wt 72.3 kg (159 lb 6.3 oz)   SpO2 90%   BMI 26.52 kg/m²         Intake/Output Summary (Last 24 hours) at 3/31/2021 1249  Last data filed at 3/31/2021 4014  Gross per 24 hour   Intake 862 ml   Output 1000 ml   Net -138 ml        Physical Examination:   Evaluated face to face and examined 03/31/21    General:  Alert, oriented, No acute distress, obese [employer], looks pale  Card:  S1, S2 without murmur, good peripheral perfusion  Resp:  No accessory muscle use, no wheezes, few crepitations  Abd:  Soft, non-tender, non-distended  Extremities:  No cyanosis or clubbing, no significant edema  Neuro:  Grossly normal, no focal neuro deficits, follows commands   Psych:  Good insight, not agitated. Data Review:    Review and/or order of clinical lab test  Review and/or order of tests in the radiology section of CPT  Review and/or order of tests in the medicine section of CPT  Labs:     Recent Labs     03/31/21 0523 03/30/21 0259   WBC 12.0* 5.4   HGB 11.6 8.8*   HCT 36.8 27.7*    265     Recent Labs     03/31/21  0523 03/30/21  0259 03/29/21  1352    136 140   K 4.3 4.6 5.5*    109* 108   CO2 18* 19* 20*   BUN 30* 33* 31*   CREA 2.31* 2.41* 2.54*   * 135* 61*   CA 9.0 8.0* 8.5   MG 2.0  --   --      Recent Labs     03/31/21  0523 03/29/21  1352   ALT <6* 26    95   TBILI 0.9 0.8   TP 7.3 6.4   ALB 3.6 3.3*   GLOB 3.7 3.1     No results for input(s): INR, PTP, APTT, INREXT, INREXT in the last 72 hours.    Recent Labs     03/31/21  0355 03/30/21  0259   TIBC 280 374   PSAT 20 16*   FERR 119  --       Lab Results   Component Value Date/Time    Folate 19.7 03/31/2021 03:55 AM      No results for input(s): PH, PCO2, PO2 in the last 72 hours.   Recent Labs     03/29/21  1352   TROIQ <0.05     Lab Results   Component Value Date/Time    Cholesterol, total 148 09/23/2020 04:27 AM    HDL Cholesterol 52 09/23/2020 04:27 AM    LDL, calculated 83.8 09/23/2020 04:27 AM    Triglyceride 61 09/23/2020 04:27 AM    CHOL/HDL Ratio 2.8 09/23/2020 04:27 AM     Lab Results   Component Value Date/Time    Glucose (POC) 219 (H) 03/31/2021 05:19 AM    Glucose (POC) 146 (H) 10/02/2020 10:28 AM    Glucose (POC) 181 (H) 10/02/2020 10:04 AM    Glucose (POC) 122 (H) 09/24/2020 09:35 PM    Glucose (POC) 132 (H) 09/24/2020 04:20 PM     Lab Results   Component Value Date/Time    Color YELLOW/STRAW 03/30/2021 11:02 AM    Appearance CLOUDY (A) 03/30/2021 11:02 AM    Specific gravity 1.008 03/30/2021 11:02 AM    pH (UA) 5.0 03/30/2021 11:02 AM    Protein 30 (A) 03/30/2021 11:02 AM    Glucose Negative 03/30/2021 11:02 AM    Ketone Negative 03/30/2021 11:02 AM    Bilirubin Negative 03/30/2021 11:02 AM    Urobilinogen 0.2 03/30/2021 11:02 AM    Nitrites Negative 03/30/2021 11:02 AM    Leukocyte Esterase LARGE (A) 03/30/2021 11:02 AM    Epithelial cells FEW 03/30/2021 11:02 AM    Bacteria 4+ (A) 03/30/2021 11:02 AM    WBC >100 (H) 03/30/2021 11:02 AM    RBC 5-10 03/30/2021 11:02 AM     Medications Reviewed:     Current Facility-Administered Medications   Medication Dose Route Frequency    sodium chloride (NS) flush 5-40 mL  5-40 mL IntraVENous Q8H    sodium chloride (NS) flush 5-40 mL  5-40 mL IntraVENous PRN    acetaminophen (TYLENOL) tablet 650 mg  650 mg Oral Q6H PRN    Or    acetaminophen (TYLENOL) suppository 650 mg  650 mg Rectal Q6H PRN    polyethylene glycol (MIRALAX) packet 17 g  17 g Oral DAILY PRN    promethazine (PHENERGAN) tablet 12.5 mg  12.5 mg Oral Q6H PRN    Or    ondansetron (ZOFRAN) injection 4 mg  4 mg IntraVENous Q6H PRN    aspirin delayed-release tablet 81 mg  81 mg Oral QHS    atorvastatin (LIPITOR) tablet 40 mg  40 mg Oral QHS    carbidopa-levodopa (SINEMET)  mg per tablet 2 Tab  2 Tab Oral QID    [Held by provider] clopidogreL (PLAVIX) tablet 75 mg  75 mg Oral DAILY AFTER BREAKFAST    DULoxetine (CYMBALTA) capsule 120 mg  120 mg Oral DAILY    pantoprazole (PROTONIX) tablet 40 mg  40 mg Oral ACB    bumetanide (BUMEX) injection 2 mg  2 mg IntraVENous DAILY    hydrALAZINE (APRESOLINE) tablet 37.5 mg  37.5 mg Oral TID    hydrALAZINE (APRESOLINE) 20 mg/mL injection 20 mg  20 mg IntraVENous Q6H PRN   ______________________________________________________________________  EXPECTED LENGTH OF STAY: - - -  ACTUAL LENGTH OF STAY:          2               Ulysses Barba MD

## 2021-03-31 NOTE — PROGRESS NOTES
TRANSITION OF CARE  RUR--28%  Disposition--TBD  PT evaluation 3/30/21: HH versus SNF  Note: currently open to a home health agency but could not remember agency name. Transport--TBD. If discharged home, may need wheelchair van. CM reviewed case with treatment team during [address]. Patient scheduled for test to r/o PE today. PT and OT deferred for today. CM met with patient who was observed to be anxious with patient reporting anxiety induced sleep disturbance for the past 2 nights. CM provided patient education: deep breathing/relaxation exercise with patient demonstrating good comprehension and  motivation to practice.

## 2021-03-31 NOTE — PROGRESS NOTES
Physical Therapy  Per RN patient has V/Q scan to r/u PE. Will defer and continue to follow.   Gurdeep Marshall, PT, DPT

## 2021-04-01 LAB
ANION GAP SERPL CALC-SCNC: 8 MMOL/L (ref 5–15)
ATRIAL RATE: 53 BPM
ATRIAL RATE: 55 BPM
BUN SERPL-MCNC: 24 MG/DL (ref 6–20)
BUN/CREAT SERPL: 14 (ref 12–20)
CALCIUM SERPL-MCNC: 8.7 MG/DL (ref 8.5–10.1)
CALCULATED P AXIS, ECG09: 20 DEGREES
CALCULATED P AXIS, ECG09: 78 DEGREES
CALCULATED R AXIS, ECG10: -60 DEGREES
CALCULATED R AXIS, ECG10: -60 DEGREES
CALCULATED T AXIS, ECG11: -170 DEGREES
CALCULATED T AXIS, ECG11: 123 DEGREES
CHLORIDE SERPL-SCNC: 110 MMOL/L (ref 97–108)
CO2 SERPL-SCNC: 22 MMOL/L (ref 21–32)
CREAT SERPL-MCNC: 1.7 MG/DL (ref 0.55–1.02)
DIAGNOSIS, 93000: NORMAL
DIAGNOSIS, 93000: NORMAL
ERYTHROCYTE [DISTWIDTH] IN BLOOD BY AUTOMATED COUNT: 16.7 % (ref 11.5–14.5)
EST. AVERAGE GLUCOSE BLD GHB EST-MCNC: 114 MG/DL
GLUCOSE BLD STRIP.AUTO-MCNC: 107 MG/DL (ref 65–100)
GLUCOSE SERPL-MCNC: 100 MG/DL (ref 65–100)
HBA1C MFR BLD: 5.6 % (ref 4–5.6)
HCT VFR BLD AUTO: 28.5 % (ref 35–47)
HGB BLD-MCNC: 9.3 G/DL (ref 11.5–16)
MCH RBC QN AUTO: 30.9 PG (ref 26–34)
MCHC RBC AUTO-ENTMCNC: 32.6 G/DL (ref 30–36.5)
MCV RBC AUTO: 94.7 FL (ref 80–99)
NRBC # BLD: 0 K/UL (ref 0–0.01)
NRBC BLD-RTO: 0 PER 100 WBC
P-R INTERVAL, ECG05: 170 MS
P-R INTERVAL, ECG05: 174 MS
PLATELET # BLD AUTO: 237 K/UL (ref 150–400)
PMV BLD AUTO: 8.3 FL (ref 8.9–12.9)
POTASSIUM SERPL-SCNC: 3.8 MMOL/L (ref 3.5–5.1)
Q-T INTERVAL, ECG07: 574 MS
Q-T INTERVAL, ECG07: 592 MS
QRS DURATION, ECG06: 116 MS
QRS DURATION, ECG06: 118 MS
QTC CALCULATION (BEZET), ECG08: 549 MS
QTC CALCULATION (BEZET), ECG08: 555 MS
RBC # BLD AUTO: 3.01 M/UL (ref 3.8–5.2)
SERVICE CMNT-IMP: ABNORMAL
SODIUM SERPL-SCNC: 140 MMOL/L (ref 136–145)
TROPONIN I SERPL-MCNC: 0.12 NG/ML
TROPONIN I SERPL-MCNC: 0.14 NG/ML
VENTRICULAR RATE, ECG03: 53 BPM
VENTRICULAR RATE, ECG03: 55 BPM
WBC # BLD AUTO: 6.2 K/UL (ref 3.6–11)

## 2021-04-01 PROCEDURE — 027034Z DILATION OF CORONARY ARTERY, ONE ARTERY WITH DRUG-ELUTING INTRALUMINAL DEVICE, PERCUTANEOUS APPROACH: ICD-10-PCS | Performed by: INTERNAL MEDICINE

## 2021-04-01 PROCEDURE — 85027 COMPLETE CBC AUTOMATED: CPT

## 2021-04-01 PROCEDURE — 77010033678 HC OXYGEN DAILY

## 2021-04-01 PROCEDURE — 99152 MOD SED SAME PHYS/QHP 5/>YRS: CPT | Performed by: INTERNAL MEDICINE

## 2021-04-01 PROCEDURE — 80048 BASIC METABOLIC PNL TOTAL CA: CPT

## 2021-04-01 PROCEDURE — B2111ZZ FLUOROSCOPY OF MULTIPLE CORONARY ARTERIES USING LOW OSMOLAR CONTRAST: ICD-10-PCS | Performed by: INTERNAL MEDICINE

## 2021-04-01 PROCEDURE — C1887 CATHETER, GUIDING: HCPCS | Performed by: INTERNAL MEDICINE

## 2021-04-01 PROCEDURE — 77030016699 HC CATH ANGI DX INFN1 CARD -A: Performed by: INTERNAL MEDICINE

## 2021-04-01 PROCEDURE — 77030013744: Performed by: INTERNAL MEDICINE

## 2021-04-01 PROCEDURE — B2121ZZ FLUOROSCOPY OF SINGLE CORONARY ARTERY BYPASS GRAFT USING LOW OSMOLAR CONTRAST: ICD-10-PCS | Performed by: INTERNAL MEDICINE

## 2021-04-01 PROCEDURE — 84484 ASSAY OF TROPONIN QUANT: CPT

## 2021-04-01 PROCEDURE — C1894 INTRO/SHEATH, NON-LASER: HCPCS | Performed by: INTERNAL MEDICINE

## 2021-04-01 PROCEDURE — 65660000000 HC RM CCU STEPDOWN

## 2021-04-01 PROCEDURE — 92937 PRQ TRLUML REVSC CAB GRF 1: CPT | Performed by: INTERNAL MEDICINE

## 2021-04-01 PROCEDURE — 74011000250 HC RX REV CODE- 250: Performed by: INTERNAL MEDICINE

## 2021-04-01 PROCEDURE — 74011250636 HC RX REV CODE- 250/636: Performed by: FAMILY MEDICINE

## 2021-04-01 PROCEDURE — C1725 CATH, TRANSLUMIN NON-LASER: HCPCS | Performed by: INTERNAL MEDICINE

## 2021-04-01 PROCEDURE — 93459 L HRT ART/GRFT ANGIO: CPT | Performed by: INTERNAL MEDICINE

## 2021-04-01 PROCEDURE — 74011000636 HC RX REV CODE- 636: Performed by: INTERNAL MEDICINE

## 2021-04-01 PROCEDURE — 93005 ELECTROCARDIOGRAM TRACING: CPT

## 2021-04-01 PROCEDURE — 77030013715 HC INFL SYS MRTM -B: Performed by: INTERNAL MEDICINE

## 2021-04-01 PROCEDURE — C1769 GUIDE WIRE: HCPCS | Performed by: INTERNAL MEDICINE

## 2021-04-01 PROCEDURE — 74011250636 HC RX REV CODE- 250/636: Performed by: INTERNAL MEDICINE

## 2021-04-01 PROCEDURE — 99153 MOD SED SAME PHYS/QHP EA: CPT | Performed by: INTERNAL MEDICINE

## 2021-04-01 PROCEDURE — 36415 COLL VENOUS BLD VENIPUNCTURE: CPT

## 2021-04-01 PROCEDURE — 97530 THERAPEUTIC ACTIVITIES: CPT

## 2021-04-01 PROCEDURE — 82962 GLUCOSE BLOOD TEST: CPT

## 2021-04-01 PROCEDURE — 74011250637 HC RX REV CODE- 250/637: Performed by: SPECIALIST

## 2021-04-01 PROCEDURE — 94760 N-INVAS EAR/PLS OXIMETRY 1: CPT

## 2021-04-01 PROCEDURE — 74011250637 HC RX REV CODE- 250/637: Performed by: INTERNAL MEDICINE

## 2021-04-01 PROCEDURE — 92928 PRQ TCAT PLMT NTRAC ST 1 LES: CPT | Performed by: INTERNAL MEDICINE

## 2021-04-01 PROCEDURE — 77030004538 HC CATH ANGI DX MP BSC -A: Performed by: INTERNAL MEDICINE

## 2021-04-01 PROCEDURE — 77030019569 HC BND COMPR RAD TERU -B: Performed by: INTERNAL MEDICINE

## 2021-04-01 PROCEDURE — 83036 HEMOGLOBIN GLYCOSYLATED A1C: CPT

## 2021-04-01 PROCEDURE — 97116 GAIT TRAINING THERAPY: CPT

## 2021-04-01 PROCEDURE — C1874 STENT, COATED/COV W/DEL SYS: HCPCS | Performed by: INTERNAL MEDICINE

## 2021-04-01 PROCEDURE — 74011000258 HC RX REV CODE- 258: Performed by: FAMILY MEDICINE

## 2021-04-01 PROCEDURE — 85347 COAGULATION TIME ACTIVATED: CPT

## 2021-04-01 PROCEDURE — 77030012468 HC VLV BLEEDBK CNTRL ABBT -B: Performed by: INTERNAL MEDICINE

## 2021-04-01 PROCEDURE — 99233 SBSQ HOSP IP/OBS HIGH 50: CPT | Performed by: SPECIALIST

## 2021-04-01 PROCEDURE — 77030010221 HC SPLNT WR POS TELE -B: Performed by: INTERNAL MEDICINE

## 2021-04-01 DEVICE — XIENCE SIERRA™ EVEROLIMUS ELUTING CORONARY STENT SYSTEM 2.25 MM X 18 MM / RAPID-EXCHANGE
Type: IMPLANTABLE DEVICE | Status: FUNCTIONAL
Brand: XIENCE SIERRA™

## 2021-04-01 RX ORDER — MIDAZOLAM HYDROCHLORIDE 1 MG/ML
INJECTION, SOLUTION INTRAMUSCULAR; INTRAVENOUS AS NEEDED
Status: DISCONTINUED | OUTPATIENT
Start: 2021-04-01 | End: 2021-04-01 | Stop reason: HOSPADM

## 2021-04-01 RX ORDER — CLOPIDOGREL 300 MG/1
TABLET, FILM COATED ORAL AS NEEDED
Status: DISCONTINUED | OUTPATIENT
Start: 2021-04-01 | End: 2021-04-01 | Stop reason: HOSPADM

## 2021-04-01 RX ORDER — LIDOCAINE HYDROCHLORIDE 10 MG/ML
INJECTION INFILTRATION; PERINEURAL AS NEEDED
Status: DISCONTINUED | OUTPATIENT
Start: 2021-04-01 | End: 2021-04-01 | Stop reason: HOSPADM

## 2021-04-01 RX ORDER — SODIUM CHLORIDE 0.9 % (FLUSH) 0.9 %
5-40 SYRINGE (ML) INJECTION AS NEEDED
Status: DISCONTINUED | OUTPATIENT
Start: 2021-04-01 | End: 2021-04-09 | Stop reason: HOSPADM

## 2021-04-01 RX ORDER — SODIUM CHLORIDE 0.9 % (FLUSH) 0.9 %
5-40 SYRINGE (ML) INJECTION EVERY 8 HOURS
Status: DISCONTINUED | OUTPATIENT
Start: 2021-04-01 | End: 2021-04-03

## 2021-04-01 RX ORDER — SODIUM CHLORIDE 9 MG/ML
500 INJECTION, SOLUTION INTRAVENOUS CONTINUOUS
Status: DISCONTINUED | OUTPATIENT
Start: 2021-04-01 | End: 2021-04-02

## 2021-04-01 RX ORDER — HEPARIN SODIUM 1000 [USP'U]/ML
INJECTION, SOLUTION INTRAVENOUS; SUBCUTANEOUS AS NEEDED
Status: DISCONTINUED | OUTPATIENT
Start: 2021-04-01 | End: 2021-04-01 | Stop reason: HOSPADM

## 2021-04-01 RX ORDER — FENTANYL CITRATE 50 UG/ML
INJECTION, SOLUTION INTRAMUSCULAR; INTRAVENOUS AS NEEDED
Status: DISCONTINUED | OUTPATIENT
Start: 2021-04-01 | End: 2021-04-01 | Stop reason: HOSPADM

## 2021-04-01 RX ADMIN — CEFTRIAXONE SODIUM 1 G: 1 INJECTION, POWDER, FOR SOLUTION INTRAMUSCULAR; INTRAVENOUS at 16:06

## 2021-04-01 RX ADMIN — CARVEDILOL 3.12 MG: 3.12 TABLET, FILM COATED ORAL at 07:07

## 2021-04-01 RX ADMIN — Medication 10 ML: at 22:46

## 2021-04-01 RX ADMIN — ASPIRIN 81 MG: 81 TABLET, COATED ORAL at 22:39

## 2021-04-01 RX ADMIN — SODIUM CHLORIDE 500 ML: 9 INJECTION, SOLUTION INTRAVENOUS at 23:55

## 2021-04-01 RX ADMIN — AMLODIPINE BESYLATE 5 MG: 5 TABLET ORAL at 10:33

## 2021-04-01 RX ADMIN — CARBIDOPA AND LEVODOPA 2 TABLET: 25; 100 TABLET ORAL at 09:51

## 2021-04-01 RX ADMIN — Medication 10 ML: at 07:08

## 2021-04-01 RX ADMIN — SODIUM CHLORIDE 500 ML: 9 INJECTION, SOLUTION INTRAVENOUS at 16:49

## 2021-04-01 RX ADMIN — ATORVASTATIN CALCIUM 40 MG: 40 TABLET, FILM COATED ORAL at 22:39

## 2021-04-01 RX ADMIN — PANTOPRAZOLE SODIUM 40 MG: 40 TABLET, DELAYED RELEASE ORAL at 07:07

## 2021-04-01 RX ADMIN — HYDRALAZINE HYDROCHLORIDE 37.5 MG: 25 TABLET, FILM COATED ORAL at 10:34

## 2021-04-01 RX ADMIN — HYDRALAZINE HYDROCHLORIDE 37.5 MG: 25 TABLET, FILM COATED ORAL at 22:38

## 2021-04-01 RX ADMIN — DULOXETINE HYDROCHLORIDE 120 MG: 60 CAPSULE, DELAYED RELEASE ORAL at 09:51

## 2021-04-01 RX ADMIN — CARBIDOPA AND LEVODOPA 2 TABLET: 25; 100 TABLET ORAL at 22:39

## 2021-04-01 RX ADMIN — CARBIDOPA AND LEVODOPA 2 TABLET: 25; 100 TABLET ORAL at 16:05

## 2021-04-01 NOTE — PROGRESS NOTES
Problem: Falls - Risk of  Goal: *Absence of Falls  Description: Document Joe Flores Fall Risk and appropriate interventions in the flowsheet.   Outcome: Progressing Towards Goal  Note: Fall Risk Interventions:  Mobility Interventions: Bed/chair exit alarm, Patient to call before getting OOB         Medication Interventions: Patient to call before getting OOB, Teach patient to arise slowly    Elimination Interventions: Call light in reach

## 2021-04-01 NOTE — PROGRESS NOTES
Occupational Therapy    Patient chart reviewed up to date. Attempted visit, however patient currently ANALISA to [facility]. Will defer and follow-up as able and appropriate. Thank you.   Cailin Cooper, OTR/L

## 2021-04-01 NOTE — PROCEDURES
Findings:  1)Severe antive 3 vessel CAD  2)Patetn LIMA to LAD, VG to d1 to OM  3. Occluded native RCA with rPDA collateralized from left system. All arteries are heavily calcified  4. Native distal LAD with 90% stenosis  5. S/p PCI of native distal LAS with 2.25 by 18 mm Xinece STEPHAN through LIMA graft  6. Normal LVEDP    Access  Left radial: Severe calcification of subclavian. Tortuous    Contrast 41 cc    Recommendations  1)Plavix based DPAT. 2. Interval renal angiogram in On Monday if she is still here--otherwise as outpatient. Not done to day to conserve contrast.  3. GDMT for CAD  4. Medical mgm for RCA disease.

## 2021-04-01 NOTE — PROGRESS NOTES
Cardiac Cath Lab Procedure Area Arrival Note:    Marilynn Angulo arrived to Cardiac Cath Lab, Procedure Area. Patient identifiers verified with NAME and DATE OF BIRTH. Procedure verified with patient. Consent forms verified. Allergies verified. Patient informed of procedure and plan of care. Questions answered with review. Patient voiced understanding of procedure and plan of care. Patient on cardiac monitor, non-invasive blood pressure, SPO2 monitor. On RA O2 @ 2 lpm via NC.  IV of NS on pump at 75 ml/hr. Patient status doing well without problems. Patient is A&Ox 4. Patient reports no chest pain or shortness of breath. Patient medicated during procedure with orders obtained and verified by Dr. Jerrica Broderick. Refer to patients Cardiac Cath Lab PROCEDURE REPORT for vital signs, assessment, status, and response during procedure, printed at end of case. Printed report on chart or scanned into chart. 14:50- Transfer to [facility] from Procedure Area    Verbal report given to Celina Melton RN on Marilynn Angulo being transferred to Cardiac Cath Lab RR for routine progression of care   Patient is post LHC/STEPHAN procedure. Patient stable upon transfer to . Report consisted of patients Situation, Background, Assessment and   Recommendations(SBAR). Information from the following report(s) SBAR and Procedure Summary was reviewed with the receiving nurse. Opportunity for questions and clarification was provided. Patient medicated during procedure with orders obtained and verified by Dr. Jerrica Broderick. Refer to patient PROCEDURE REPORT for vital signs, assessment, status, and response during procedure.

## 2021-04-01 NOTE — PROGRESS NOTES
19:25 TRANSFER - IN REPORT:    Verbal report received from [facility]) on [employer]  being received from cath lab(unit) for routine progression of care      Report consisted of patients Situation, Background, Assessment and   Recommendations(SBAR). Information from the following report(s) SBAR was reviewed with the receiving nurse. Opportunity for questions and clarification was provided. Assessment completed upon patients arrival to unit and care assumed.      10:40 CBC, renal panel drawn and sent to lab

## 2021-04-01 NOTE — PROGRESS NOTES
Bedside shift change report given to Kathryn Nicolas (oncoming nurse) by Rae Carreno RN (offgoing nurse). Report included the following information SBAR and Kardex.

## 2021-04-01 NOTE — PROGRESS NOTES
TRANSFER - OUT REPORT:    Verbal report given to [address] on [employer] being transferred to Room 463 for routine progression of care       Report consisted of patients Situation, Background, Assessment and   Recommendations(SBAR). Information from the following report(s) SBAR, Procedure Summary, MAR, Recent Results and Cardiac Rhythm Sinus Bradycardia was reviewed with the receiving nurse. Opportunity for questions and clarification was provided.

## 2021-04-01 NOTE — PROGRESS NOTES
TRANSFER - IN REPORT:    Verbal report received from [facility]) on [employer]  being received from Room 202(unit) for routine progression of care      Report consisted of patients Situation, Background, Assessment and   Recommendations(SBAR). Information from the following report(s) SBAR, Procedure Summary, MAR, Recent Results and Cardiac Rhythm Sinus Bradycardia was reviewed with the receiving nurse. Opportunity for questions and clarification was provided. Assessment completed upon patients arrival to unit and care assumed.

## 2021-04-01 NOTE — PROGRESS NOTES
Cardiology Progress Note            Admit Date: 3/29/2021  Admit Diagnosis: CHF exacerbation ([address] [I50.9]  Date: 4/1/2021     Time: 8:44 AM    Subjective:   Episode of chest pain/heaviness early this a. m. Also had acute onset of SOB, diaphoresis  And O2 sat dropped to 80% on 3L NC. Sats initially increased to 90% on 12 Liters. Her BP was markedly elevated. Nurse reports pt had productive cough with some pinked tinged sputum. CXR showed mild pulmonary edema, left pleural effusion, no significant change from prior xray. EKG shows NSR, LBBB. Troponin pending. Now on rounds, pt O2 sat 93% and she states her breathing is better. No chest pain currently. Assessment and Plan     1. Acute hypoxic respiratory insufficiency   -Improved   -VQ negative for PTE   -CXR with pulmonary edema pattern. Suspect acute CHF with new LV dysfunction   -Bumex 2 mg IV daily    2. Acute systolic CHF/Cardiomyopathy   -New   -EF 25-30%, mod-severe MR, TR, severe pulmon HTN, mild-mod AI   -NYHA III today   -BLE Edema improved. -Stop bumex today for [facility] as she appears better compensated- lungs clear, no BLE edema   -Coreg.    -No Ace-I/ARB due to renal dysfunction.   -Hydralazine TID      3. Elevated troponin   -No further chest pain   -Repeat EKG with now incomplete LBBB with marked T wave abnormality anterolateral   -Troponin mildly elevated 0.05-0.14.   -Will new LV dysfunction, CAD of concern. Will proceed with [facility] today at 1:15PM with Dr. Alanna Tirado. 4. PAF: recurrent   -No further PAF on telemetry review   -Now NSR-   -experienced bradycardia with Coreg on 3/30- but tolerating lower dose 3.125 mg BID for now. Suspect some element of SSS   -Echo:Last echo 5/4/20 EF 55-60%, mod MR,mild AI, mildly reduced RV function   [id number] vasc= at least 8.not on [address] PTA. Anemia of concern but improved today. 5.  HTN :    -mildly elevated   -On amlodipine, hydralazine, coreg     6. MORENO on CKD    -Creatinine trending down 1.7 today. -Nephrology following- concern for Left BATOOL by MRA. Left renal arteriogram today with C. 7. Anemia, normocytic   -Hgb 11.6 today, from 8.8 ? accuracy   -Check stool OB    8. CAD              - s/p CABG x 3 2015,  Stent 2007              - Coreg, Lipitor,, ASA- stop Plavix. -Nuclear stress 5/5/2020: No ischemia, possile small distal anterior infarct EF 53%  9. Prolonged Qtc : 536 today   -d/w Dr. Isha Snider- prolonged QtC due to LBBB pattern    10. PHTN              - Severe by echo, PAS 85 mmHg     11. MR              - Moderate- severe by repeat echo    12. Infra renal abdominal aortic aneurysm   -4 cm by US   -monitor. Other comorbids:  13. Carotid dz              - s/p CEA on left              - Plavix, Lipitor- holding plavix. 14. HLD:   - Lipitor   15. DM              - A1C 5.7             16. H/o CVA              - left lacunar infarct at head of caudate     No further chest pain but troponin elevation of uncertain etiology- CAD of concern given new LV dysfunction. Her renal function is improved today. Plan  [facility] with Left renal arteriogram today with Dr. Saint Lobstein. Discussed with pt and she is agreeable to proceed. Og Eli. SUAD Sue 4/1/2021 1000AM  Cardiology Attending:Patient seen and examined. I agree with NP assessment and plans. Creatinine down to 1.7, new anterior T wave inversions, will proceed with cath today. She understands risk of ARF.     Justine Hawk MD 4/1/2021 11:56 AM                PMH  Past Medical History:   Diagnosis Date    Anxiety disorder     Atrial fibrillation ([address]     CAD (coronary artery disease) 2007    stents, CABG x 3v    Carotid stenosis     Cervical stenosis of spinal canal 07/2019    Chronic kidney disease     Cough     CVA (cerebral vascular accident) ([address] 07/2019    left lacunar infarct at head of caudate    Depression     AND CHRONIC ANXIETY    Diabetes ([address]     GERD (gastroesophageal reflux disease)     High cholesterol     History of peptic ulcer     Bleeding ulcer with increased NSAID use    Hypertension     Left carotid stenosis 2019    s/p left CEA with Dr. Km Ponce, old 2007    PUD (peptic ulcer disease)     [facility]     Tremor     Valvular heart disease       Social Hx  Social History     Socioeconomic History    Marital status: SINGLE     Spouse name: Not on file    Number of children: Not on file    Years of education: Not on file    Highest education level: Not on file   Occupational History    Occupation: Retired realestate/teacher   Social Needs    Financial resource strain: Not on file    Food insecurity     Worry: Not on file     Inability: Not on file   [facility] needs     Medical: Not on file     Non-medical: Not on file   Tobacco Use    Smoking status: Former Smoker     Packs/day: 0.25     Years: 5.00     Pack years: 1.25     Types: Cigarettes     Quit date:      Years since quittin.2    Smokeless tobacco: Never Used   Substance and Sexual Activity    Alcohol use:  Yes     Alcohol/week: 0.0 standard drinks     Comment: Rare    Drug use: No    Sexual activity: Not on file   Lifestyle    Physical activity     Days per week: Not on file     Minutes per session: Not on file    Stress: Not on file   Relationships    Social connections     Talks on phone: Not on file     Gets together: Not on file     Attends [employer] service: Not on file     Active member of club or organization: Not on file     Attends meetings of clubs or organizations: Not on file     Relationship status: Not on file    Intimate partner violence     Fear of current or ex partner: Not on file     Emotionally abused: Not on file     Physically abused: Not on file     Forced sexual activity: Not on file   Other Topics Concern    Not on file   Social History Narrative    L[facility] in [facility]       Objective:   Physical Exam: Visit Vitals  BP (!) 145/61 (BP 1 Location: Right upper arm, BP Patient Position: At rest)   Pulse (!) 57   Temp 98.2 °F (36.8 °C)   Resp 18   Ht 5' 5\" (1.651 m)   Wt 142 lb 3.2 oz (64.5 kg)   SpO2 96%   BMI 23.66 kg/m²          General Appearance:   Well developed, pale, alert and oriented x 3, and   individual in no acute distress. Ears/Nose/Mouth/Throat:    Hearing grossly normal.         Neck:  Supple. Chest:    Lungs with left basilar crackles,   Cardiovascular:  Regular rate and rhythm, S1, S2 normal, no murmur. Abdomen:    Soft, non-tender, bowel sounds are active. Extremities:  no edema bilaterally. Skin:  Warm and dry. Pale. Telemetry: NSR with BBB currently. No PAF            Data Review:    Labs:    Recent Results (from the past 24 hour(s))   ECHO ADULT COMPLETE    Collection Time: 03/31/21  2:43 PM   Result Value Ref Range    IVSd 1.07 (A) 0.60 - 0.90 cm    LVIDd 5.02 3.90 - 5.30 cm    LVIDs 4.28 cm    LVOT d 1.93 cm    LVPWd 0.79 0.60 - 0.90 cm    BP EF 27.6 (A) 55.0 - 100.0 percent    LV Ejection Fraction MOD 2C 31 percent    LV Ejection Fraction MOD 4C 25 percent    LV ED Vol A2C 156.21 mL    LV ED Vol A4C 140.89 mL    LV ED Vol .71 (A) 56.0 - 104.0 mL    LV ES Vol A2C 107. 27 mL    LV ES Vol A4C 105.84 mL    LV ES Vol .33 (A) 19.0 - 49.0 mL    LVOT Peak Gradient 2.99 mmHg    LVOT Peak Velocity 86.47 cm/s    RVIDd 4.20 cm    RVSP 82.39 mmHg    Left Atrium Major Axis 4.27 cm    LA Volume 98.06 22.0 - 52.0 mL    LA Area 4C 28.72 cm2    LA Vol 2C 86.84 (A) 22.00 - 52.00 mL    LA Vol 4C 99.73 (A) 22.00 - 52.00 mL    Est. RA Pressure 8.00 mmHg    Aortic Valve Area by Continuity of Peak Velocity 2.17 cm2    AV R PG 75.76 mmHg    Aortic Regurgitant Pressure Half-time 274.26 ms    AR Max Jordan 435.21 cm/s    AoV PG 5.47 mmHg    Aortic Valve Systolic Peak Velocity 277.47 cm/s    MV A Jordan 72.50 cm/s    Mitral Valve E Wave Deceleration Time 134.99 ms    MV E Jordan 154.88 cm/s    E/E' ratio (averaged) 19.41     E/E' lateral 19.29     E/E' septal 19.53     LV E' Lateral Velocity 8.03 cm/s    LV E' Septal Velocity 7.93 cm/s    Mitral Valve Pressure Half-time 39.15 ms    MVA (PHT) 5.62 cm2    Tapse 1.14 (A) 1.50 - 2.00 cm    Triscuspid Valve Regurgitation Peak Gradient 74.39 mmHg    TR Max Velocity 431.23 cm/s    Ao Root D 2.86 cm    MV E/A 2.14     LV Mass .3 67.0 - 162.0 g    LV Mass AL Index 92.7 43.0 - 95.0 g/m2    LVES Vol Index BP 60.4 mL/m2    LVED Vol Index BP 83.4 mL/m2    Left Atrium Minor Axis 2.38 cm    LA Vol Index 54.65 16.00 - 28.00 ml/m2    LA Vol Index 48.40 16.00 - 28.00 ml/m2    LA Vol Index 55.58 16.00 - 28.00 ml/m2    LVED Vol Index A4C 78.5 mL/m2    LVED Vol Index A2C 87.1 mL/m2    LVES Vol Index A4C 59.0 mL/m2    LVES Vol Index A2C 59.8 mL/m2    PRINCE/BSA Pk Jordan 1.2 cm2/m2   TROPONIN I    Collection Time: 03/31/21  4:10 PM   Result Value Ref Range    Troponin-I, Qt. 0.09 (H) <0.05 ng/mL   TROPONIN I    Collection Time: 03/31/21 11:26 PM   Result Value Ref Range    Troponin-I, Qt. 0.12 (H) <0.05 ng/mL   HEMOGLOBIN A1C WITH EAG    Collection Time: 04/01/21  5:32 AM   Result Value Ref Range    Hemoglobin A1c 5.6 4.0 - 5.6 %    Est. average glucose 114 mg/dL   TROPONIN I    Collection Time: 04/01/21  5:32 AM   Result Value Ref Range    Troponin-I, Qt. 0.14 (H) <5.48 ng/mL   METABOLIC PANEL, BASIC    Collection Time: 04/01/21  5:32 AM   Result Value Ref Range    Sodium 140 136 - 145 mmol/L    Potassium 3.8 3.5 - 5.1 mmol/L    Chloride 110 (H) 97 - 108 mmol/L    CO2 22 21 - 32 mmol/L    Anion gap 8 5 - 15 mmol/L    Glucose 100 65 - 100 mg/dL    BUN 24 (H) 6 - 20 MG/DL    Creatinine 1.70 (H) 0.55 - 1.02 MG/DL    BUN/Creatinine ratio 14 12 - 20      GFR est AA 35 (L) >60 ml/min/1.73m2    GFR est non-AA 29 (L) >60 ml/min/1.73m2    Calcium 8.7 8.5 - 10.1 MG/DL   CBC W/O DIFF    Collection Time: 04/01/21  5:32 AM   Result Value Ref Range    WBC 6.2 3.6 - 11.0 K/uL    RBC 3.01 (L) 3.80 - 5.20 M/uL    HGB 9.3 (L) 11.5 - 16.0 g/dL    HCT 28.5 (L) 35.0 - 47.0 %    MCV 94.7 80.0 - 99.0 FL    MCH 30.9 26.0 - 34.0 PG    MCHC 32.6 30.0 - 36.5 g/dL    RDW 16.7 (H) 11.5 - 14.5 %    PLATELET 548 871 - 471 K/uL    MPV 8.3 (L) 8.9 - 12.9 FL    NRBC 0.0 0  WBC    ABSOLUTE NRBC 0.00 0.00 - 0.01 K/uL   EKG, 12 LEAD, INITIAL    Collection Time: 04/01/21  9:33 AM   Result Value Ref Range    Ventricular Rate 55 BPM    Atrial Rate 55 BPM    P-R Interval 174 ms    QRS Duration 118 ms    Q-T Interval 574 ms    QTC Calculation (Bezet) 549 ms    Calculated P Axis 20 degrees    Calculated R Axis -60 degrees    Calculated T Axis 123 degrees    Diagnosis       Sinus bradycardia  Left axis deviation  Incomplete left bundle branch block  Marked T wave abnormality, consider anterolateral ischemia  Prolonged QT  Abnormal ECG  When compared with ECG of 31-MAR-2021 08:08,  Incomplete left bundle branch block has replaced Left bundle branch block            Radiology:        Current Facility-Administered Medications   Medication Dose Route Frequency    amLODIPine (NORVASC) tablet 5 mg  5 mg Oral DAILY    carvediloL (COREG) tablet 3.125 mg  3.125 mg Oral BID WITH MEALS    sodium chloride (NS) flush 5-40 mL  5-40 mL IntraVENous Q8H    sodium chloride (NS) flush 5-40 mL  5-40 mL IntraVENous PRN    acetaminophen (TYLENOL) tablet 650 mg  650 mg Oral Q6H PRN    Or    acetaminophen (TYLENOL) suppository 650 mg  650 mg Rectal Q6H PRN    polyethylene glycol (MIRALAX) packet 17 g  17 g Oral DAILY PRN    promethazine (PHENERGAN) tablet 12.5 mg  12.5 mg Oral Q6H PRN    Or    ondansetron (ZOFRAN) injection 4 mg  4 mg IntraVENous Q6H PRN    aspirin delayed-release tablet 81 mg  81 mg Oral QHS    atorvastatin (LIPITOR) tablet 40 mg  40 mg Oral QHS    carbidopa-levodopa (SINEMET)  mg per tablet 2 Tab  2 Tab Oral QID    [Held by provider] clopidogreL (PLAVIX) tablet 75 mg  75 mg Oral DAILY AFTER BREAKFAST    DULoxetine (CYMBALTA) capsule 120 mg  120 mg Oral DAILY    pantoprazole (PROTONIX) tablet 40 mg  40 mg Oral ACB    hydrALAZINE (APRESOLINE) tablet 37.5 mg  37.5 mg Oral TID    hydrALAZINE (APRESOLINE) 20 mg/mL injection 20 mg  20 mg IntraVENous Q6H PRN          Ching Martino.  SUAD Sue     [facility] [address] [place] [address], [address] [place] [address],[address]   [facility], [facility]   [phone]

## 2021-04-01 NOTE — PROGRESS NOTES
TRANSFER - IN REPORT:    Verbal report received from [address] [place] [address] on [employer]  being received from procedure area for routine progression of care. Report consisted of patients Situation, Background, Assessment and Recommendations(SBAR). Information from the following report(s) SBAR, Procedure Summary, MAR, Recent Results and Cardiac Rhythm NSR was reviewed with the receiving clinician. Opportunity for questions and clarification was provided. Assessment completed upon patients arrival to [address] [place] [address] and care assumed. Cardiac Cath Lab Recovery Arrival Note:    [employer] arrived to [facility] recovery area. Patient procedure= PCI. Patient on cardiac monitor, non-invasive blood pressure, SPO2 monitor. On O2 @ 2 lpm via NC.  IV  of NS on pump at 25 ml/hr. Patient status doing well without problems. Patient is A&Ox 4. Patient reports No Pain. PROCEDURE SITE CHECK:    Procedure site:without any bleeding and No Hematoma, No pain/discomfort reported at procedure site. No change in patient status. Continue to monitor patient and status.

## 2021-04-01 NOTE — PROGRESS NOTES
[facility]   [address] [facility], [facility] [address], [facility]  Phone: [phone]   [phone]       Nephrology Progress Note  Dona Graham     [birth year]     [id number]  Date of Admission : 3/29/2021  04/01/21    CC: Follow up for ARF    Assessment and Plan   MORENO on CKD   - Etiology : progressive renovascular disease   - hold Bumex due to planned LHC and renal angio  - may need some IVF   - labs daily   - strict I. O      Left BATOOL :  - MRA confirmed severe / complete occlusion   - d/w Dr Stokes Confer about Left renal angio  - right Kidney ok     Infra renal AAA  - 3.9 cm   - follow up w/ vascular as out pt     CKD 3  - baseline Cr ~ 1.4-1.5 mg/dl   - renal US: progressive Left > R renal atrophy. Benign cysts   - presumed to be 2/2 DM, HTN      Mild acidosis :  - 2/2 CRF. Watch for now      Pulm edema/ Effusions/ CHF  CAD s/p CABG 2015  - diuresis as above   - Echo EF 25%   - for [facility] today      Tremor   - claims anxiety. ? Parkinsons     NCNC Anemia   - ? 2/2 CKD   - normal iron, B12, folate   - f/u Myeloma screen      PVD  S/P Left CEA         Care Plan discussed with:  Pt        Interval History:  Volume status better  BP stable   Cr down to 1.7  MRA findings d/w pt and cards     Review of Systems: A comprehensive review of systems was negative except for that written in the HPI.     Current Medications:   Current Facility-Administered Medications   Medication Dose Route Frequency    amLODIPine (NORVASC) tablet 5 mg  5 mg Oral DAILY    carvediloL (COREG) tablet 3.125 mg  3.125 mg Oral BID WITH MEALS    sodium chloride (NS) flush 5-40 mL  5-40 mL IntraVENous Q8H    sodium chloride (NS) flush 5-40 mL  5-40 mL IntraVENous PRN    acetaminophen (TYLENOL) tablet 650 mg  650 mg Oral Q6H PRN    Or    acetaminophen (TYLENOL) suppository 650 mg  650 mg Rectal Q6H PRN    polyethylene glycol (MIRALAX) packet 17 g  17 g Oral DAILY PRN    promethazine (PHENERGAN) tablet 12.5 mg  12.5 mg Oral Q6H PRN    Or    ondansetron (ZOFRAN) injection 4 mg  4 mg IntraVENous Q6H PRN    aspirin delayed-release tablet 81 mg  81 mg Oral QHS    atorvastatin (LIPITOR) tablet 40 mg  40 mg Oral QHS    carbidopa-levodopa (SINEMET)  mg per tablet 2 Tab  2 Tab Oral QID    [Held by provider] clopidogreL (PLAVIX) tablet 75 mg  75 mg Oral DAILY AFTER BREAKFAST    DULoxetine (CYMBALTA) capsule 120 mg  120 mg Oral DAILY    pantoprazole (PROTONIX) tablet 40 mg  40 mg Oral ACB    hydrALAZINE (APRESOLINE) tablet 37.5 mg  37.5 mg Oral TID    hydrALAZINE (APRESOLINE) 20 mg/mL injection 20 mg  20 mg IntraVENous Q6H PRN      No Known Allergies    Objective:  Vitals:    Vitals:    04/01/21 0101 04/01/21 0413 04/01/21 0706 04/01/21 0801   BP: (!) 113/59 (!) 148/64 (!) 148/70 (!) 145/61   Pulse: 66 61 62 (!) 57   Resp: 18 18  18   Temp: 98 °F (36.7 °C) 98.5 °F (36.9 °C)  98.2 °F (36.8 °C)   SpO2: 99% 100% 100% 96%   Weight:   64.5 kg (142 lb 3.2 oz)    Height:         Intake and Output:  04/01 0701 - 04/01 1900  In: -   Out: 200 [Urine:200]  03/30 1901 - 04/01 0700  In: 522 [P.O.:522]  Out: 1000 [Urine:1000]    Physical Examination:  General:  Appears stated age   HEENT: PERRL,  ++ Pallor , No Icterus  Neck: Supple,no mass palpable  Lungs : b/l rales   CVS: RRR, S1 S2 normal, No murmur   Abdomen: Soft, NT, BS +  Extremities: 1-2+ Edema, tender to touch   Skin: discoloration in both legs . MS: No joint swelling, erythema, warmth  Neurologic: non focal, AAO x 3  Psych: anxious     []    High complexity decision making was performed  []    Patient is at high-risk of decompensation with multiple organ involvement    Lab Data Personally Reviewed: I have reviewed all the pertinent labs, microbiology data and radiology studies during assessment.     Recent Labs     04/01/21  0532 03/31/21  0523 03/30/21  0259 03/29/21  1352    137 136 140   K 3.8 4.3 4.6 5.5*   * 107 109* 108   CO2 22 18* 19* 20*    229* 135* 61*   BUN 24* 30* 33* 31*   CREA 1.70* 2.31* 2.41* 2.54*   CA 8.7 9.0 8.0* 8.5   MG  --  2.0  --   --    ALB  --  3.6  --  3.3*   ALT  --  <6*  --  26     Recent Labs     04/01/21  0532 03/31/21  0523 03/30/21  0259 03/29/21  1352   WBC 6.2 12.0* 5.4 7.9   HGB 9.3* 11.6 8.8* 10.4*   HCT 28.5* 36.8 27.7* 33.0*    322 265 311     No results found for: SDES  Lab Results   Component Value Date/Time    Culture result: MRSA NOT PRESENT 12/26/2018 09:29 PM    Culture result:  12/26/2018 09:29 PM         Screening of patient nares for MRSA is for surveillance purposes and, if positive, to facilitate isolation considerations in high risk settings. It is not intended for automatic decolonization interventions per se as regimens are not sufficiently effective to warrant routine use. Culture result: MRSA PRESENT 02/09/2015 06:30 PM    Culture result:  02/09/2015 06:30 PM         Screening of patient nares for MRSA is for surveillance purposes and, if positive, to facilitate isolation considerations in high risk settings. It is not intended for automatic decolonization interventions per se as regimens are not sufficiently effective to warrant routine use. Culture result: MRSA PRESENT 09/09/2014 05:02 PM    Culture result:  09/09/2014 05:02 PM         Screening of patient nares for MRSA is for surveillance purposes and, if positive, to facilitate isolation considerations in high risk settings. It is not intended for automatic decolonization interventions per se as regimens are not sufficiently effective to warrant routine use.      Recent Results (from the past 24 hour(s))   ECHO ADULT COMPLETE    Collection Time: 03/31/21  2:43 PM   Result Value Ref Range    IVSd 1.07 (A) 0.60 - 0.90 cm    LVIDd 5.02 3.90 - 5.30 cm    LVIDs 4.28 cm    LVOT d 1.93 cm    LVPWd 0.79 0.60 - 0.90 cm    BP EF 27.6 (A) 55.0 - 100.0 percent    LV Ejection Fraction MOD 2C 31 percent    LV Ejection Fraction MOD 4C 25 percent    LV ED Vol A2C 156.21 mL LV ED Vol A4C 140.89 mL    LV ED Vol .71 (A) 56.0 - 104.0 mL    LV ES Vol A2C 107. 27 mL    LV ES Vol A4C 105.84 mL    LV ES Vol .33 (A) 19.0 - 49.0 mL    LVOT Peak Gradient 2.99 mmHg    LVOT Peak Velocity 86.47 cm/s    RVIDd 4.20 cm    RVSP 82.39 mmHg    Left Atrium Major Axis 4.27 cm    LA Volume 98.06 22.0 - 52.0 mL    LA Area 4C 28.72 cm2    LA Vol 2C 86.84 (A) 22.00 - 52.00 mL    LA Vol 4C 99.73 (A) 22.00 - 52.00 mL    Est. RA Pressure 8.00 mmHg    Aortic Valve Area by Continuity of Peak Velocity 2.17 cm2    AV R PG 75.76 mmHg    Aortic Regurgitant Pressure Half-time 274.26 ms    AR Max Jordan 435.21 cm/s    AoV PG 5.47 mmHg    Aortic Valve Systolic Peak Velocity 826.75 cm/s    MV A Jordan 72.50 cm/s    Mitral Valve E Wave Deceleration Time 134.99 ms    MV E Jordan 154.88 cm/s    E/E' ratio (averaged) 19.41     E/E' lateral 19.29     E/E' septal 19.53     LV E' Lateral Velocity 8.03 cm/s    LV E' Septal Velocity 7.93 cm/s    Mitral Valve Pressure Half-time 39.15 ms    MVA (PHT) 5.62 cm2    Tapse 1.14 (A) 1.50 - 2.00 cm    Triscuspid Valve Regurgitation Peak Gradient 74.39 mmHg    TR Max Velocity 431.23 cm/s    Ao Root D 2.86 cm    MV E/A 2.14     LV Mass .3 67.0 - 162.0 g    LV Mass AL Index 92.7 43.0 - 95.0 g/m2    LVES Vol Index BP 60.4 mL/m2    LVED Vol Index BP 83.4 mL/m2    Left Atrium Minor Axis 2.38 cm    LA Vol Index 54.65 16.00 - 28.00 ml/m2    LA Vol Index 48.40 16.00 - 28.00 ml/m2    LA Vol Index 55.58 16.00 - 28.00 ml/m2    LVED Vol Index A4C 78.5 mL/m2    LVED Vol Index A2C 87.1 mL/m2    LVES Vol Index A4C 59.0 mL/m2    LVES Vol Index A2C 59.8 mL/m2    PRINCE/BSA Pk Jordan 1.2 cm2/m2   TROPONIN I    Collection Time: 03/31/21  4:10 PM   Result Value Ref Range    Troponin-I, Qt. 0.09 (H) <0.05 ng/mL   TROPONIN I    Collection Time: 03/31/21 11:26 PM   Result Value Ref Range    Troponin-I, Qt. 0.12 (H) <0.05 ng/mL   HEMOGLOBIN A1C WITH EAG    Collection Time: 04/01/21  5:32 AM   Result Value Ref Range    Hemoglobin A1c 5.6 4.0 - 5.6 %    Est. average glucose 114 mg/dL   TROPONIN I    Collection Time: 04/01/21  5:32 AM   Result Value Ref Range    Troponin-I, Qt. 0.14 (H) <7.28 ng/mL   METABOLIC PANEL, BASIC    Collection Time: 04/01/21  5:32 AM   Result Value Ref Range    Sodium 140 136 - 145 mmol/L    Potassium 3.8 3.5 - 5.1 mmol/L    Chloride 110 (H) 97 - 108 mmol/L    CO2 22 21 - 32 mmol/L    Anion gap 8 5 - 15 mmol/L    Glucose 100 65 - 100 mg/dL    BUN 24 (H) 6 - 20 MG/DL    Creatinine 1.70 (H) 0.55 - 1.02 MG/DL    BUN/Creatinine ratio 14 12 - 20      GFR est AA 35 (L) >60 ml/min/1.73m2    GFR est non-AA 29 (L) >60 ml/min/1.73m2    Calcium 8.7 8.5 - 10.1 MG/DL   CBC W/O DIFF    Collection Time: 04/01/21  5:32 AM   Result Value Ref Range    WBC 6.2 3.6 - 11.0 K/uL    RBC 3.01 (L) 3.80 - 5.20 M/uL    HGB 9.3 (L) 11.5 - 16.0 g/dL    HCT 28.5 (L) 35.0 - 47.0 %    MCV 94.7 80.0 - 99.0 FL    MCH 30.9 26.0 - 34.0 PG    MCHC 32.6 30.0 - 36.5 g/dL    RDW 16.7 (H) 11.5 - 14.5 %    PLATELET 123 938 - 673 K/uL    MPV 8.3 (L) 8.9 - 12.9 FL    NRBC 0.0 0  WBC    ABSOLUTE NRBC 0.00 0.00 - 0.01 K/uL   EKG, 12 LEAD, INITIAL    Collection Time: 04/01/21  9:33 AM   Result Value Ref Range    Ventricular Rate 55 BPM    Atrial Rate 55 BPM    P-R Interval 174 ms    QRS Duration 118 ms    Q-T Interval 574 ms    QTC Calculation (Bezet) 549 ms    Calculated P Axis 20 degrees    Calculated R Axis -60 degrees    Calculated T Axis 123 degrees    Diagnosis       Sinus bradycardia  Left axis deviation  Incomplete left bundle branch block  Marked T wave abnormality, consider anterolateral ischemia  Prolonged QT  Abnormal ECG  When compared with ECG of 31-MAR-2021 08:08,  Incomplete left bundle branch block has replaced Left bundle branch block             Total time spent with patient:  xxx   min.                                Care Plan discussed with:  Patient     Family      RN      Consulting Physician Minnie Akers I have reviewed the flowsheets. Chart and Pertinent Notes have been reviewed. No change in PMH ,family and social history from Consult note.       Eugenia Castro MD

## 2021-04-01 NOTE — PROGRESS NOTES
Problem: Mobility Impaired (Adult and Pediatric)  Goal: *Acute Goals and Plan of Care (Insert Text)  Description: FUNCTIONAL STATUS PRIOR TO ADMISSION: Patient was modified independent using a wheelchair and at times a RW for functional mobility. HOME SUPPORT PRIOR TO ADMISSION: The patient lived alone with an aide once a week for 4 hours to provide assistance. Physical Therapy Goals  Initiated 3/30/2021  1. Patient will move from supine to sit and sit to supine  and roll side to side in bed with modified independence within 7 day(s). 2.  Patient will transfer from bed to chair and chair to bed with modified independence using the least restrictive device within 7 day(s). 3.  Patient will perform sit to stand with modified independence within 7 day(s). 4.  Patient will ambulate with modified independence for 75 feet with the least restrictive device within 7 day(s). Outcome: Progressing Towards Goal   PHYSICAL THERAPY TREATMENT  Patient: Yady Nagy (60 y.o. female)  Date: 4/1/2021  Diagnosis: CHF exacerbation ([facility] [address] [I50.9] <principal problem not specified>  Procedure(s) (LRB):  LEFT HEART CATH / CORONARY ANGIOGRAPHY (N/A)    Precautions: Fall  Chart, physical therapy assessment, plan of care and goals were reviewed. ASSESSMENT  Patient continues with skilled PT services and is progressing towards goals. Of note, pt pending [facility] today. Pt tolerated multiple standing trials and amb in room x 2. Continues to require RW for stability and CGA for balance. Pt received with O2 @ 4L, SpO2 100%; titrated down to 3L with SpO2 % throughout session; notified RN who recommended titrate further to 1 L at end of session. Pt remains below functional baseline with increased risk for fall. Pt motivated to return home at d/c, however anticipate need for increased assist. Pt will benefit from acute PT for balance/ mobility progression.  Recommend follow up rehab v home with supervision and [place] PT.    Current Level of Function Impacting Discharge (mobility/balance): supine to sit CGA, sit to supine mod A, sit to stand CGA, amb with RW CGA    Other factors to consider for discharge: lives alone, recently discharged from [facility] today         PLAN :  Patient continues to benefit from skilled intervention to address the above impairments. Continue treatment per established plan of care. to address goals. Recommendation for discharge: (in order for the patient to meet his/her long term goals)  To be determined: rehab v home with increased assist and HHPT    This discharge recommendation:  Has not yet been discussed the attending provider and/or case management    IF patient discharges home will need the following DME: patient owns DME required for discharge       SUBJECTIVE:   Patient stated I really don't want to go back to rehab.     OBJECTIVE DATA SUMMARY:   Critical Behavior:  Neurologic State: Alert  Orientation Level: Oriented X4  Cognition: Follows commands  Safety/Judgement: Awareness of environment  Functional Mobility Training:  Bed Mobility:     Supine to Sit: Stand-by assistance;Contact guard assistance; Additional time;Bed Modified  Sit to Supine:  Moderate assistance(assist for LEs)           Transfers:  Sit to Stand: Contact guard assistance  Stand to Sit: Contact guard assistance                             Balance:  Sitting: Intact  Standing: Impaired  Standing - Static: Good;Constant support  Standing - Dynamic : Fair;Constant support  Ambulation/Gait Training:  Distance (ft): 15 Feet (ft)(x 2)  Assistive Device: Gait belt;Walker, rolling  Ambulation - Level of Assistance: Contact guard assistance        Gait Abnormalities: Decreased step clearance              Speed/Samira: Pace decreased (<100 feet/min)  Step Length: Left shortened;Right shortened        Pain Rating:  No c/o pain    Activity Tolerance:   Good    After treatment patient left in no apparent distress:   Supine in bed, Heels elevated for pressure relief, Call bell within reach and Side rails x 3    COMMUNICATION/COLLABORATION:   The patients plan of care was discussed with: Registered nurse.      Rios Deleon, PT   Time Calculation: 34 mins

## 2021-04-01 NOTE — PROGRESS NOTES
ANDREA:  1. RUR-24%  2. Admitted from home, plan is tor return with home health. 3. Transport- family vs BLS. CM noted patient had a recent stay at [facility]. CM spoke with admission Stacy Davis, who confirmed the patient is open to [facility]. Referral sent ,waiting response.     Chris Delatorre [facility]

## 2021-04-01 NOTE — PROGRESS NOTES
[address] [facility] Adult  Hospitalist Group                                                                                          Hospitalist Progress Note  Gay Robles MD  Answering service: [phone] OR [phone] from in house phone              Progress Note    Patient: Sean Brown MRN: [id number]  SSN: [id number]    YOB: 1940  Age: 79 y.o. Sex: female      Admit Date: 3/29/2021    LOS: 3 days     Subjective:     Patient presents with acute on chronic CHF as well as A. fib with RVR. Breathing is improving and currently rate is controlled. Patient has no acute complaint today. Objective:     Vitals:    04/01/21 0413 04/01/21 0706 04/01/21 0801 04/01/21 0951   BP: (!) 148/64 (!) 148/70 (!) 145/61 (!) 151/73   Pulse: 61 62 (!) 57 (!) 58   Resp: 18  18    Temp: 98.5 °F (36.9 °C)  98.2 °F (36.8 °C)    SpO2: 100% 100% 96% 98%   Weight:  64.5 kg (142 lb 3.2 oz)     Height:            Intake and Output:  Current Shift: 04/01 0701 - 04/01 1900  In: -   Out: 200 [Urine:200]  Last three shifts: 03/30 1901 - 04/01 0700  In: 522 [P.O.:522]  Out: 1000 [Urine:1000]    Physical Exam:   GENERAL: alert, cooperative, no distress, appears stated age  THROAT & NECK: normal and no erythema or exudates noted. LUNG: clear to auscultation bilaterally  HEART: regular rate and rhythm, S1, S2 normal, no murmur, click, rub or gallop  ABDOMEN: soft, non-tender. Bowel sounds normal. No masses,  no organomegaly  EXTREMITIES:  extremities normal, atraumatic, no cyanosis or edema  SKIN: no rash or abnormalities  NEUROLOGIC: AOx3. PSYCHIATRIC: non focal    Lab/Data Review: All lab results for the last 24 hours reviewed.      Recent Results (from the past 24 hour(s))   ECHO ADULT COMPLETE    Collection Time: 03/31/21  2:43 PM   Result Value Ref Range    IVSd 1.07 (A) 0.60 - 0.90 cm    LVIDd 5.02 3.90 - 5.30 cm    LVIDs 4.28 cm    LVOT d 1.93 cm    LVPWd 0.79 0.60 - 0.90 cm    BP EF 27.6 (A) 55.0 - 100.0 percent    LV Ejection Fraction MOD 2C 31 percent    LV Ejection Fraction MOD 4C 25 percent    LV ED Vol A2C 156.21 mL    LV ED Vol A4C 140.89 mL    LV ED Vol .71 (A) 56.0 - 104.0 mL    LV ES Vol A2C 107. 27 mL    LV ES Vol A4C 105.84 mL    LV ES Vol .33 (A) 19.0 - 49.0 mL    LVOT Peak Gradient 2.99 mmHg    LVOT Peak Velocity 86.47 cm/s    RVIDd 4.20 cm    RVSP 82.39 mmHg    Left Atrium Major Axis 4.27 cm    LA Volume 98.06 22.0 - 52.0 mL    LA Area 4C 28.72 cm2    LA Vol 2C 86.84 (A) 22.00 - 52.00 mL    LA Vol 4C 99.73 (A) 22.00 - 52.00 mL    Est. RA Pressure 8.00 mmHg    Aortic Valve Area by Continuity of Peak Velocity 2.17 cm2    AV R PG 75.76 mmHg    Aortic Regurgitant Pressure Half-time 274.26 ms    AR Max Jordan 435.21 cm/s    AoV PG 5.47 mmHg    Aortic Valve Systolic Peak Velocity 599.92 cm/s    MV A Jordan 72.50 cm/s    Mitral Valve E Wave Deceleration Time 134.99 ms    MV E Jordan 154.88 cm/s    E/E' ratio (averaged) 19.41     E/E' lateral 19.29     E/E' septal 19.53     LV E' Lateral Velocity 8.03 cm/s    LV E' Septal Velocity 7.93 cm/s    Mitral Valve Pressure Half-time 39.15 ms    MVA (PHT) 5.62 cm2    Tapse 1.14 (A) 1.50 - 2.00 cm    Triscuspid Valve Regurgitation Peak Gradient 74.39 mmHg    TR Max Velocity 431.23 cm/s    Ao Root D 2.86 cm    MV E/A 2.14     LV Mass .3 67.0 - 162.0 g    LV Mass AL Index 92.7 43.0 - 95.0 g/m2    LVES Vol Index BP 60.4 mL/m2    LVED Vol Index BP 83.4 mL/m2    Left Atrium Minor Axis 2.38 cm    LA Vol Index 54.65 16.00 - 28.00 ml/m2    LA Vol Index 48.40 16.00 - 28.00 ml/m2    LA Vol Index 55.58 16.00 - 28.00 ml/m2    LVED Vol Index A4C 78.5 mL/m2    LVED Vol Index A2C 87.1 mL/m2    LVES Vol Index A4C 59.0 mL/m2    LVES Vol Index A2C 59.8 mL/m2    PRINCE/BSA Pk Jordan 1.2 cm2/m2   TROPONIN I    Collection Time: 03/31/21  4:10 PM   Result Value Ref Range    Troponin-I, Qt. 0.09 (H) <0.05 ng/mL   TROPONIN I    Collection Time: 03/31/21 11:26 PM   Result Value Ref Range    Troponin-I, Qt. 0.12 (H) <0.05 ng/mL   HEMOGLOBIN A1C WITH EAG    Collection Time: 04/01/21  5:32 AM   Result Value Ref Range    Hemoglobin A1c 5.6 4.0 - 5.6 %    Est. average glucose 114 mg/dL   TROPONIN I    Collection Time: 04/01/21  5:32 AM   Result Value Ref Range    Troponin-I, Qt. 0.14 (H) <5.36 ng/mL   METABOLIC PANEL, BASIC    Collection Time: 04/01/21  5:32 AM   Result Value Ref Range    Sodium 140 136 - 145 mmol/L    Potassium 3.8 3.5 - 5.1 mmol/L    Chloride 110 (H) 97 - 108 mmol/L    CO2 22 21 - 32 mmol/L    Anion gap 8 5 - 15 mmol/L    Glucose 100 65 - 100 mg/dL    BUN 24 (H) 6 - 20 MG/DL    Creatinine 1.70 (H) 0.55 - 1.02 MG/DL    BUN/Creatinine ratio 14 12 - 20      GFR est AA 35 (L) >60 ml/min/1.73m2    GFR est non-AA 29 (L) >60 ml/min/1.73m2    Calcium 8.7 8.5 - 10.1 MG/DL   CBC W/O DIFF    Collection Time: 04/01/21  5:32 AM   Result Value Ref Range    WBC 6.2 3.6 - 11.0 K/uL    RBC 3.01 (L) 3.80 - 5.20 M/uL    HGB 9.3 (L) 11.5 - 16.0 g/dL    HCT 28.5 (L) 35.0 - 47.0 %    MCV 94.7 80.0 - 99.0 FL    MCH 30.9 26.0 - 34.0 PG    MCHC 32.6 30.0 - 36.5 g/dL    RDW 16.7 (H) 11.5 - 14.5 %    PLATELET 592 813 - 903 K/uL    MPV 8.3 (L) 8.9 - 12.9 FL    NRBC 0.0 0  WBC    ABSOLUTE NRBC 0.00 0.00 - 0.01 K/uL   EKG, 12 LEAD, INITIAL    Collection Time: 04/01/21  9:33 AM   Result Value Ref Range    Ventricular Rate 55 BPM    Atrial Rate 55 BPM    P-R Interval 174 ms    QRS Duration 118 ms    Q-T Interval 574 ms    QTC Calculation (Bezet) 549 ms    Calculated P Axis 20 degrees    Calculated R Axis -60 degrees    Calculated T Axis 123 degrees    Diagnosis       Sinus bradycardia  Left axis deviation  Incomplete left bundle branch block  Marked T wave abnormality, consider anterolateral ischemia  Prolonged QT  Abnormal ECG  When compared with ECG of 31-MAR-2021 08:08,  Incomplete left bundle branch block has replaced Left bundle branch block  Confirmed by Tracie Suero MD, Judge Ram ([id number]) on 4/1/2021 11:13:14 AM          Imaging:           Assessment and Plan:     Congestive heart failure  -Acute systolic congestive heart failure NYHA class III, echo with EF of 25-30, new finding  -Currently Bumex on hold pending left heart cath  -Cardiology following    Diaphoresis  -Low probability for PE on V/Q  -Oxygenation improving and patient clinically improved    Atrial fibrillation with RVR  -Rate is now controlled, continue Coreg  -Not on anticoagulation at current    Valvular heart disease  -Moderate to severe mitral and tricuspid regurgitation on echo  -Plan per cardiology    MORENO  -MORENO on CKD stage III  -Improving renal function  -Nephrology following  -Now diuresis on hold pending cardiac cath    Renal artery stenosis  -Left renal artery high-grade stenosis and mild stenosis of the right renal artery on MRA of the abdomen  -Plan for renal angiogram today    Aortic aneurysm  -Infrarenal aortic aneurysm of 3.9 cm, incidental finding on MRA of the abdomen  -Outpatient follow-up     Urinary tract infection  -Urinalysis yesterday showing pyuria  -Check urine culture and start empirically on Rocephin    Hypertension  -Continue amlodipine, Coreg and hydralazine  -Blood pressure is stable    Parkinson disease   -Continue Sinemet    Anemia  -Anemia of chronic kidney disease  -H&H is stable    Discharge disposition: Likely more than 48 hours pending progress.     Signed By: Viviana Mclaughlin MD     April 1, 2021

## 2021-04-02 LAB
ACT BLD: 197 SECS (ref 79–138)
ACT BLD: 378 SECS (ref 79–138)
ALBUMIN SERPL-MCNC: 2.8 G/DL (ref 3.5–5)
ANION GAP SERPL CALC-SCNC: 7 MMOL/L (ref 5–15)
ANION GAP SERPL CALC-SCNC: 7 MMOL/L (ref 5–15)
BUN SERPL-MCNC: 25 MG/DL (ref 6–20)
BUN SERPL-MCNC: 25 MG/DL (ref 6–20)
BUN/CREAT SERPL: 15 (ref 12–20)
BUN/CREAT SERPL: 16 (ref 12–20)
CALCIUM SERPL-MCNC: 8.4 MG/DL (ref 8.5–10.1)
CALCIUM SERPL-MCNC: 8.6 MG/DL (ref 8.5–10.1)
CHLORIDE SERPL-SCNC: 110 MMOL/L (ref 97–108)
CHLORIDE SERPL-SCNC: 111 MMOL/L (ref 97–108)
CO2 SERPL-SCNC: 20 MMOL/L (ref 21–32)
CO2 SERPL-SCNC: 22 MMOL/L (ref 21–32)
CREAT SERPL-MCNC: 1.61 MG/DL (ref 0.55–1.02)
CREAT SERPL-MCNC: 1.7 MG/DL (ref 0.55–1.02)
ERYTHROCYTE [DISTWIDTH] IN BLOOD BY AUTOMATED COUNT: 17 % (ref 11.5–14.5)
GLUCOSE BLD STRIP.AUTO-MCNC: 138 MG/DL (ref 65–100)
GLUCOSE SERPL-MCNC: 154 MG/DL (ref 65–100)
GLUCOSE SERPL-MCNC: 79 MG/DL (ref 65–100)
HCT VFR BLD AUTO: 25.6 % (ref 35–47)
HEMOCCULT STL QL: NEGATIVE
HGB BLD-MCNC: 8 G/DL (ref 11.5–16)
MCH RBC QN AUTO: 30.3 PG (ref 26–34)
MCHC RBC AUTO-ENTMCNC: 31.3 G/DL (ref 30–36.5)
MCV RBC AUTO: 97 FL (ref 80–99)
NRBC # BLD: 0 K/UL (ref 0–0.01)
NRBC BLD-RTO: 0 PER 100 WBC
PHOSPHATE SERPL-MCNC: 2.7 MG/DL (ref 2.6–4.7)
PLATELET # BLD AUTO: 186 K/UL (ref 150–400)
PMV BLD AUTO: 8.6 FL (ref 8.9–12.9)
POTASSIUM SERPL-SCNC: 3.8 MMOL/L (ref 3.5–5.1)
POTASSIUM SERPL-SCNC: 4.1 MMOL/L (ref 3.5–5.1)
RBC # BLD AUTO: 2.64 M/UL (ref 3.8–5.2)
SERVICE CMNT-IMP: ABNORMAL
SODIUM SERPL-SCNC: 138 MMOL/L (ref 136–145)
SODIUM SERPL-SCNC: 139 MMOL/L (ref 136–145)
WBC # BLD AUTO: 4.8 K/UL (ref 3.6–11)

## 2021-04-02 PROCEDURE — 80069 RENAL FUNCTION PANEL: CPT

## 2021-04-02 PROCEDURE — 87186 SC STD MICRODIL/AGAR DIL: CPT

## 2021-04-02 PROCEDURE — 85027 COMPLETE CBC AUTOMATED: CPT

## 2021-04-02 PROCEDURE — 74011250637 HC RX REV CODE- 250/637: Performed by: SPECIALIST

## 2021-04-02 PROCEDURE — 97530 THERAPEUTIC ACTIVITIES: CPT

## 2021-04-02 PROCEDURE — 97535 SELF CARE MNGMENT TRAINING: CPT

## 2021-04-02 PROCEDURE — 87077 CULTURE AEROBIC IDENTIFY: CPT

## 2021-04-02 PROCEDURE — 65660000000 HC RM CCU STEPDOWN

## 2021-04-02 PROCEDURE — 74011250636 HC RX REV CODE- 250/636: Performed by: FAMILY MEDICINE

## 2021-04-02 PROCEDURE — 80048 BASIC METABOLIC PNL TOTAL CA: CPT

## 2021-04-02 PROCEDURE — 87086 URINE CULTURE/COLONY COUNT: CPT

## 2021-04-02 PROCEDURE — 82272 OCCULT BLD FECES 1-3 TESTS: CPT

## 2021-04-02 PROCEDURE — 97116 GAIT TRAINING THERAPY: CPT

## 2021-04-02 PROCEDURE — 99233 SBSQ HOSP IP/OBS HIGH 50: CPT | Performed by: INTERNAL MEDICINE

## 2021-04-02 PROCEDURE — 74011250637 HC RX REV CODE- 250/637: Performed by: INTERNAL MEDICINE

## 2021-04-02 PROCEDURE — 74011000258 HC RX REV CODE- 258: Performed by: FAMILY MEDICINE

## 2021-04-02 PROCEDURE — 36415 COLL VENOUS BLD VENIPUNCTURE: CPT

## 2021-04-02 PROCEDURE — 82962 GLUCOSE BLOOD TEST: CPT

## 2021-04-02 RX ORDER — CARVEDILOL 3.12 MG/1
3.12 TABLET ORAL 2 TIMES DAILY WITH MEALS
Status: DISCONTINUED | OUTPATIENT
Start: 2021-04-02 | End: 2021-04-05

## 2021-04-02 RX ADMIN — ASPIRIN 81 MG: 81 TABLET, COATED ORAL at 22:05

## 2021-04-02 RX ADMIN — PANTOPRAZOLE SODIUM 40 MG: 40 TABLET, DELAYED RELEASE ORAL at 08:04

## 2021-04-02 RX ADMIN — ACETAMINOPHEN 650 MG: 325 TABLET ORAL at 00:09

## 2021-04-02 RX ADMIN — CEFTRIAXONE SODIUM 1 G: 1 INJECTION, POWDER, FOR SOLUTION INTRAMUSCULAR; INTRAVENOUS at 13:31

## 2021-04-02 RX ADMIN — DULOXETINE HYDROCHLORIDE 120 MG: 60 CAPSULE, DELAYED RELEASE ORAL at 08:10

## 2021-04-02 RX ADMIN — ATORVASTATIN CALCIUM 40 MG: 40 TABLET, FILM COATED ORAL at 22:05

## 2021-04-02 RX ADMIN — CARBIDOPA AND LEVODOPA 2 TABLET: 25; 100 TABLET ORAL at 22:05

## 2021-04-02 RX ADMIN — Medication 10 ML: at 08:05

## 2021-04-02 RX ADMIN — Medication 10 ML: at 13:32

## 2021-04-02 RX ADMIN — HYDRALAZINE HYDROCHLORIDE 37.5 MG: 25 TABLET, FILM COATED ORAL at 08:10

## 2021-04-02 RX ADMIN — Medication 10 ML: at 08:04

## 2021-04-02 RX ADMIN — AMLODIPINE BESYLATE 5 MG: 5 TABLET ORAL at 08:10

## 2021-04-02 RX ADMIN — CARBIDOPA AND LEVODOPA 2 TABLET: 25; 100 TABLET ORAL at 17:06

## 2021-04-02 RX ADMIN — CLOPIDOGREL BISULFATE 75 MG: 75 TABLET ORAL at 08:11

## 2021-04-02 RX ADMIN — CARBIDOPA AND LEVODOPA 2 TABLET: 25; 100 TABLET ORAL at 08:10

## 2021-04-02 RX ADMIN — CARBIDOPA AND LEVODOPA 2 TABLET: 25; 100 TABLET ORAL at 13:31

## 2021-04-02 RX ADMIN — HYDRALAZINE HYDROCHLORIDE 37.5 MG: 25 TABLET, FILM COATED ORAL at 22:05

## 2021-04-02 RX ADMIN — Medication 10 ML: at 22:05

## 2021-04-02 NOTE — PROGRESS NOTES
Problem: Mobility Impaired (Adult and Pediatric)  Goal: *Acute Goals and Plan of Care (Insert Text)  Description: FUNCTIONAL STATUS PRIOR TO ADMISSION: Patient was modified independent using a wheelchair and at times a RW for functional mobility. HOME SUPPORT PRIOR TO ADMISSION: The patient lived alone with an aide once a week for 4 hours to provide assistance. Physical Therapy Goals  Initiated 3/30/2021  1. Patient will move from supine to sit and sit to supine  and roll side to side in bed with modified independence within 7 day(s). 2.  Patient will transfer from bed to chair and chair to bed with modified independence using the least restrictive device within 7 day(s). 3.  Patient will perform sit to stand with modified independence within 7 day(s). 4.  Patient will ambulate with modified independence for 75 feet with the least restrictive device within 7 day(s). Outcome: Progressing Towards Goal   PHYSICAL THERAPY TREATMENT  Patient: Regulo Alexis (26 y.o. female)  Date: 4/2/2021  Diagnosis: CHF exacerbation ([facility] [address] [I50.9] <principal problem not specified>  Procedure(s) (LRB):  LEFT HEART CATH / CORONARY ANGIOGRAPHY (N/A)  Percutaneous Coronary Intervention (N/A) 1 Day Post-Op  Precautions: Fall  Chart, physical therapy assessment, plan of care and goals were reviewed. ASSESSMENT  Patient continues with skilled PT services and is slowly progressing towards goals. Today pt was limited by impaired standing balance and need for assist to get from sit to stand. She stood three times and level of assist varied from contact guard off the bed to min assist and then mod assist to stand from the chair. Additionally after amb, she sat down quickly to the chair, poor eccentric control and impaired safety awareness. She is also limited by impaired activity tolerance, needing to sit to rest after 10 feet of amb. Note SALAMANCA but 02 sats stable in the upper 90s on room air with activity.  As she presents today, expect that pt would likely have difficulty managing in her home where she lives alone. Pt reported to me not having anyone she call upon to assist her. She is a high fall risk at this time, anticipate need for rehab. For the weekend, recommend patient to complete as able in order to maintain strength, endurance and independence with nursing: OOB to chair 3x/day with assist X 1 and ambulating with assist X 1 using the rolling walker. PT to follow-up with patient after the weekend. Current Level of Function Impacting Discharge (mobility/balance): supervision to mod assist, impaired standing balance. Other factors to consider for discharge: heat failure and MORENO on CKD         PLAN :  Patient continues to benefit from skilled intervention to address the above impairments. Continue treatment per established plan of care. to address goals. Recommendation for discharge: (in order for the patient to meet his/her long term goals)  Therapy up to 5 days/week in SNF setting if this is not an option then HHPT and assist at home with mobility as is available     This discharge recommendation:  A follow-up discussion with the attending provider and/or case management is planned    IF patient discharges home will need the following DME: bedside commode       SUBJECTIVE:   Patient stated that she did not like [place] but did like another facility where she had stayed for rehab but was unable to recall the name of that facility.      OBJECTIVE DATA SUMMARY:   Chart checked, pt cleared by nursing  Critical Behavior:  Neurologic State: Alert  Orientation Level: Oriented X4  Cognition: Appropriate decision making, Appropriate for age attention/concentration, Appropriate safety awareness, Follows commands  Safety/Judgement: Awareness of environment  Functional Mobility Training:  Bed Mobility:     Supine to Sit: Supervision;Bed Modified              Transfers:  Sit to Stand: Contact guard assistance;Minimum assistance; Moderate assistance(for 3/3 sit to stands)  Stand to Sit: Minimum assistance(with noted poor eccentric contro)                             Balance:  Sitting: Intact; Without support  Standing: Impaired; With support  Standing - Static: Constant support;Good  Standing - Dynamic : Constant support; Fair  Ambulation/Gait Training:  Distance (ft): 20 Feet (ft)(with a seated rest break after 10 feet)  Assistive Device: Gait belt;Walker, rolling  Ambulation - Level of Assistance: Contact guard assistance(occasional min assist)        Gait Abnormalities: Decreased step clearance        Base of Support: Widened     Speed/Samira: Slow;Pace decreased (<100 feet/min)  Step Length: Left shortened;Right shortened        Interventions: Safety awareness training           Stairs: Therapeutic Exercises:   Seated: LAQs combined with ankle pumps and seated marching  Pain Rating:  None rated    Activity Tolerance:   Fair, SpO2 stable on RA, requires frequent rest breaks, and observed SOB with activity    After treatment patient left in no apparent distress:   Sitting in chair and Call bell within reach    COMMUNICATION/COLLABORATION:   The patients plan of care was discussed with: Occupational therapist and Registered nurse.      Eduardo Michaud   Time Calculation: 30 mins

## 2021-04-02 NOTE — PROGRESS NOTES
Problem: Self Care Deficits Care Plan (Adult)  Goal: *Acute Goals and Plan of Care (Insert Text)  Description: Description: FUNCTIONAL STATUS PRIOR TO ADMISSION: Patient was modified independent using a wheelchair and at times a RW for functional mobility. HOME SUPPORT PRIOR TO ADMISSION: The patient lived alone with an aide once a week for 4 hours to provide assistance. Occupational Therapy Goals  Initiated 3/30/2021  1. Patient will perform bathing with modified independence within 7 day(s). 2.  Patient will perform grooming standing at sink with modified independence within 7 day(s). 3.  Patient will perform lower body dressing with modified independence within 7 day(s). 4.  Patient will perform toilet transfers with modified independence within 7 day(s). 5.  Patient will perform all aspects of toileting with modified independence within 7 day(s). 6.  Patient will utilize energy conservation techniques during functional activities with verbal cues within 7 day(s). Outcome: Progressing Towards Goal   OCCUPATIONAL THERAPY TREATMENT  Patient: Katalina Torrez (25 y.o. female)  Date: 4/2/2021  Diagnosis: CHF exacerbation ([facility] [address] [I50.9] <principal problem not specified>  Procedure(s) (LRB):  LEFT HEART CATH / CORONARY ANGIOGRAPHY (N/A)  Percutaneous Coronary Intervention (N/A) 1 Day Post-Op  Precautions: Fall  Chart, occupational therapy assessment, plan of care, and goals were reviewed. ASSESSMENT  Patient continues with skilled OT services and is slowly progressing towards goals. Patient now s/p [facility] 4/1/2021 and was limited by bradycardia and hypotension this session, with patient complaining of fatigue and inability to maintain standing tolerance for > 2 minutes. Patient also limited by impaired standing balance, intermittent confusion, resting tremors, decreased safety awareness, and decreased insight into deficits.  Attempted tailor sitting for LB dressing seated in recliner however patient required increased time due to weakness for managing socks/shoes respectively (MIN A overall). Patient also noted to have SALAMANCA this session however with O2 sats and RR stable. Current Level of Function Impacting Discharge (ADLs): MIN A-CGA chair level ADLs    Other factors to consider for discharge: noted new OT eval orders placed today; will continue to follow patient per original POC         PLAN :  Patient continues to benefit from skilled intervention to address the above impairments. Continue treatment per established plan of care to address goals. Recommend with staff: OOB x 3 to chair    Recommend next OT session: LB ADL progression; standing tolerance for grooming tasks at the sink     Recommendation for discharge: (in order for the patient to meet his/her long term goals)  Therapy up to 5 days/week in SNF setting vs home with [address] [place] [address] and increased hired assistance (patient gets assistance from 9 am - 1 pm one day per week at this time; stated she does not know if she can afford hiring more assistance than this)    This discharge recommendation:  Has been made in collaboration with the attending provider and/or case management    IF patient discharges home will need the following DME: TBD       SUBJECTIVE:   Patient stated I don't want to go to rehab; they didn't do anything with me at [facility]; they would be there for 30 minutes and sometimes all they would do with me was talk.     OBJECTIVE DATA SUMMARY:   Cognitive/Behavioral Status:  Neurologic State: Alert  Orientation Level: Oriented X4  Cognition: Appropriate for age attention/concentration  Perception: Appears intact  Perseveration: No perseveration noted  Safety/Judgement: Decreased insight into deficits; Decreased awareness of need for safety    Functional Mobility and Transfers for ADLs:  Bed Mobility:  Supine to Sit: Supervision;Bed Modified    Transfers:  Sit to Stand: Contact guard assistance          Balance:  Sitting: Intact; With support  Standing: Impaired; With support  Standing - Static: Fair  Standing - Dynamic : Fair;Constant support    ADL Intervention:                           Lower Body Dressing Assistance  Socks: Minimum assistance; Compensatory technique training         Cognitive Retraining  Safety/Judgement: Decreased insight into deficits; Decreased awareness of need for safety    Therapeutic Exercises:   Patient completed 1 set x 5 reps shoulder flexion (AAROM-limited to ~100 degrees active-assisted flexion 2* arthritis), shoulder abduction (AAROM), scapular elevation, and scapular retraction. Pain:  No complaints    Activity Tolerance:   Fair, requires frequent rest breaks, and observed SOB with activity; bradycardia; hypotension    Vitals:    04/02/21 1200 04/02/21 1202 04/02/21 1212 04/02/21 1216   BP:  (!) 101/37 (!) 97/35 (!) 95/48   BP 1 Location:  Left upper arm Left upper arm Left upper arm   BP Patient Position:  Sitting Standing Sitting   Pulse: (!) 54 (!) 52 63 (!) 55   Resp:  19 22 22   Temp:       SpO2:  100%     Weight:       Height:            After treatment patient left in no apparent distress:   Sitting in chair and Call bell within reach    COMMUNICATION/COLLABORATION:   The patients plan of care was discussed with: Physical therapist and Registered nurse.      Bridger Madden  Time Calculation: 23 mins

## 2021-04-02 NOTE — PROGRESS NOTES
Transitions of Care Plan:  RUR:  26%  Clinical Plan:  Left renal angio on Monday  Consults: Cardiology; Nephrology; Therapy  Baseline:  Resides at [facility] alone; supportive niece; open with HH  Disposition: Pending medical progress: HH vs SNF    CM received a call from [facility] - patient is open with agency for home health services. Will need resumption of orders prior to discharge. Chart reviewed - patient has supportive niece; recently had short stay at [facility] [place] [facility] and was discharged home with home health. CM will continue to follow.     Steve Adler, MPH  [facility]  Available via [facility] or

## 2021-04-02 NOTE — PROGRESS NOTES
[facility]  Hospitalist Group                                                                                          Hospitalist Progress Note  Dipak Nava MD  Answering service: [phone] OR [phone] from in house phone              Progress Note    Patient: Markell Jim MRN: [id number]  SSN: [id number]    YOB: 1940  Age: 79 y.o. Sex: female      Admit Date: 3/29/2021    LOS: 4 days     Subjective:     Patient presents with acute on chronic CHF as well as A. fib with RVR. Breathing is improving and currently rate is controlled. Patient has no acute complaint today. S/p cardiac cath with placement of stent to LAD. Overall doing well. Objective:     Vitals:    04/02/21 1202 04/02/21 1212 04/02/21 1216 04/02/21 1328   BP: (!) 101/37 (!) 97/35 (!) 95/48 (!) 103/33   Pulse: (!) 52 63 (!) 55 60   Resp: 19 22 22    Temp:       SpO2: 100%      Weight:       Height:            Intake and Output:  Current Shift: 04/02 0701 - 04/02 1900  In: 860 [P.O.:760; I.V.:100]  Out: -   Last three shifts: 03/31 1901 - 04/02 0700  In: 1575.8 [P.O.:530; I.V.:1045.8]  Out: 600 [Urine:600]    Physical Exam:   GENERAL: alert, cooperative, no distress, appears stated age  THROAT & NECK: normal and no erythema or exudates noted. LUNG: clear to auscultation bilaterally  HEART: regular rate and rhythm, S1, S2 normal, no murmur, click, rub or gallop  ABDOMEN: soft, non-tender. Bowel sounds normal. No masses,  no organomegaly  EXTREMITIES:  extremities normal, atraumatic, no cyanosis or edema  SKIN: no rash or abnormalities  NEUROLOGIC: AOx3. PSYCHIATRIC: non focal    Lab/Data Review: All lab results for the last 24 hours reviewed.      Recent Results (from the past 24 hour(s))   POC ACTIVATED CLOTTING TIME    Collection Time: 04/01/21  2:23 PM   Result Value Ref Range    Activated Clotting Time (POC) 197 (H) 79 - 138 SECS   POC ACTIVATED CLOTTING TIME    Collection Time: 04/01/21  2:37 PM   Result Value Ref Range    Activated Clotting Time (POC) 378 (H) 79 - 138 SECS   EKG, 12 LEAD, INITIAL    Collection Time: 04/01/21  3:04 PM   Result Value Ref Range    Ventricular Rate 53 BPM    Atrial Rate 53 BPM    P-R Interval 170 ms    QRS Duration 116 ms    Q-T Interval 592 ms    QTC Calculation (Bezet) 555 ms    Calculated P Axis 78 degrees    Calculated R Axis -60 degrees    Calculated T Axis -170 degrees    Diagnosis       Sinus bradycardia  Left axis deviation  Septal infarct , age undetermined  T wave abnormality, consider anterior ischemia  Prolonged QT  When compared with ECG of 01-APR-2021 09:33,  No significant change was found  Confirmed by Geryl Olszewski, MD, Artem Solis ([id number]) on 4/1/2021 3:35:37 PM     GLUCOSE, POC    Collection Time: 04/01/21  9:16 PM   Result Value Ref Range    Glucose (POC) 107 (H) 65 - 100 mg/dL    Performed by [facility]    METABOLIC PANEL, BASIC    Collection Time: 04/02/21  5:30 AM   Result Value Ref Range    Sodium 139 136 - 145 mmol/L    Potassium 4.1 3.5 - 5.1 mmol/L    Chloride 110 (H) 97 - 108 mmol/L    CO2 22 21 - 32 mmol/L    Anion gap 7 5 - 15 mmol/L    Glucose 79 65 - 100 mg/dL    BUN 25 (H) 6 - 20 MG/DL    Creatinine 1.61 (H) 0.55 - 1.02 MG/DL    BUN/Creatinine ratio 16 12 - 20      GFR est AA 37 (L) >60 ml/min/1.73m2    GFR est non-AA 31 (L) >60 ml/min/1.73m2    Calcium 8.6 8.5 - 10.1 MG/DL   RENAL FUNCTION PANEL    Collection Time: 04/02/21 10:36 AM   Result Value Ref Range    Sodium 138 136 - 145 mmol/L    Potassium 3.8 3.5 - 5.1 mmol/L    Chloride 111 (H) 97 - 108 mmol/L    CO2 20 (L) 21 - 32 mmol/L    Anion gap 7 5 - 15 mmol/L    Glucose 154 (H) 65 - 100 mg/dL    BUN 25 (H) 6 - 20 MG/DL    Creatinine 1.70 (H) 0.55 - 1.02 MG/DL    BUN/Creatinine ratio 15 12 - 20      GFR est AA 35 (L) >60 ml/min/1.73m2    GFR est non-AA 29 (L) >60 ml/min/1.73m2    Calcium 8.4 (L) 8.5 - 10.1 MG/DL    Phosphorus 2.7 2.6 - 4.7 MG/DL    Albumin 2.8 (L) 3.5 - 5.0 g/dL   CBC W/O DIFF Collection Time: 04/02/21 10:36 AM   Result Value Ref Range    WBC 4.8 3.6 - 11.0 K/uL    RBC 2.64 (L) 3.80 - 5.20 M/uL    HGB 8.0 (L) 11.5 - 16.0 g/dL    HCT 25.6 (L) 35.0 - 47.0 %    MCV 97.0 80.0 - 99.0 FL    MCH 30.3 26.0 - 34.0 PG    MCHC 31.3 30.0 - 36.5 g/dL    RDW 17.0 (H) 11.5 - 14.5 %    PLATELET 806 388 - 062 K/uL    MPV 8.6 (L) 8.9 - 12.9 FL    NRBC 0.0 0  WBC    ABSOLUTE NRBC 0.00 0.00 - 0.01 K/uL   GLUCOSE, POC    Collection Time: 04/02/21 11:45 AM   Result Value Ref Range    Glucose (POC) 138 (H) 65 - 100 mg/dL    Performed by Farhan Anthony  PCT         Imaging:           Assessment and Plan:     Congestive heart failure  -Acute systolic congestive heart failure NYHA class III, echo with EF of 25-30, new finding  -Cardiology following    Coronary artery disease  -It is post cardiac cath 4/1, s/p PCI with STEPHAN to LAD  -Continue dual antiplatelet  -Cardiology following    LV thrombus  -Noted on echocardiogram  -Plan for limited echo to definitely rule out LV thrombus    Diaphoresis  -Low probability for PE on V/Q  -Oxygenation improving and patient clinically improved    Atrial fibrillation with RVR  -Rate is now controlled, continue Coreg  -Not on anticoagulation at current    Valvular heart disease  -Moderate to severe mitral and tricuspid regurgitation on echo  -Plan per cardiology    MORENO  -MORENO on CKD stage III  -Improving renal function  -Nephrology following  -Plan to resume diuresis tomorrow    Renal artery stenosis  -Left renal artery high-grade stenosis and mild stenosis of the right renal artery on MRA of the abdomen  -Plan for renal angiogram on Monday    Aortic aneurysm  -Infrarenal aortic aneurysm of 3.9 cm, incidental finding on MRA of the abdomen  -Outpatient follow-up     Urinary tract infection  -Urinalysis  showing pyuria  -Continue Rocephin and follow urine culture    Hypertension  -Continue amlodipine, Coreg and hydralazine  -Blood pressure is stable    Parkinson disease -Continue Sinemet    Anemia  -Anemia of chronic kidney disease  -H&H is stable    Discharge disposition: Likely more than 48 hours pending progress. PT evaluation.     Signed By: Nael Dang MD     April 2, 2021

## 2021-04-02 NOTE — CARDIO/PULMONARY
Cardiac rehab: HF and CAD education folder to bedside. Pt has nurse in to assist to bathroom. [facility] cardiac rehab is on her AVS.

## 2021-04-02 NOTE — PROGRESS NOTES
Cardiology Progress Note            Admit Date: 3/29/2021  Admit Diagnosis: CHF exacerbation ([address] [I50.9]  Date: 4/2/2021     Time: 8:44 AM    Subjective:   No further chest pain. Denies SOB. States she feels better today. Underwent LHC with PCI/STEPHAN to LAD through LIMA graft yesterday. Assessment and Plan     1.. Acute systolic CHF/Cardiomyopathy, possibly ischemic   -EF 25-30%, mod-severe MR, TR, severe pulmon HTN, mild-mod AI   -NYHA III    -Resume bumex 2 mg daily per renal   -Coreg stopped due to bradycardia since heart rates better will restart Coreg at 3.25 mg twice a day. -No Ace-I/ARB due to renal dysfunction.   -Hydralazine nitrate therapy as an alternative would be continued.   -Consider CRT in 3 months      3. NSTEMI/CAD   -No further chest pain   -C 4/21: Patent LIMA to LAD, VG to D1 to OM. S/p PCI/STEPHAN to LAD via LIMA. -% stenosed distal, prox 80%, mid 50%. Medical management.   -Continue ASA, Plavix, high intensity statin     4. PAF: recurrent   -No further PAF on tele review   -Now NSR   -Some bradycardia with low dose coreg- stopped 4/1/21  -Suspect some element of SSS   -[id number] vasc= at least 8.not on [facility] PTA. Anemia of concern. 5. HTN :    -controlled   -On amlodipine, hydralazine, coreg     6. MORENO on CKD    -Creatinine trending down 1.61 today. -Nephrology following- concern for Left BATOOL by MRA. -Left renal arteriogram deferred yesterday due to contrast load. Will proceed with Renal arteriogram on Monday if patient still inpatient. 7. Anemia, normocytic   -Repeat cbc pending   -Check stool OB    8. CAD              - s/p CABG x 3 2015,  Stent 2007              - Coreg, Lipitor,, ASA- stop Plavix. -Nuclear stress 5/5/2020: No ischemia, possile small distal anterior infarct EF 53%    9.   Concern regarding LV thrombus apex   -noted on  Echocardiogram read   -We will perform a limited echocardiogram with Definity to rule out LV thrombus    10. PHTN              - Severe by echo, PAS 85 mmHg     11. MR              - Moderate- severe by repeat echo, likely functional in nature may improve with cardiac resynchronization therapy. 12. Infra renal abdominal aortic aneurysm   -4 cm by US   -monitor. Other comorbids:  13. Carotid dz              - s/p CEA on left              - Plavix, Lipitor. 14. HLD:   - Lipitor   15. DM              - A1C 5.7       16. H/o CVA              - left lacunar infarct at head of caudate     Summary:   79 y.o. female with PMH of CAD, now with new LV dysfunction, and NSTEMI who undewent PCI/STEPHAN to LAD yesterday. She is feeling better today. Plavix based DAPT planned. Medical management of RCA disease. Renal function improving. Plan to possibly proceed with left Renal artery arteriogram Monday. There was a concern on echocardiogram regarding possible LV thrombus. We will perform definitive study with with echocardiogram.  She will require more assistance than she currently has at home. May require anesthesia care during her discharge. If she is here through the weekend, will perform renal angiogram on Monday. She will definitively require diuretic therapy given dilated IVC on her echocardiogram and persistent clinical evidence of congestion. Particularly given underlying functional mitral and tricuspid regurgitation, lack of diuretics on board will put her at high risk of repeat decompensation  Isaac Sue NP 2021 1000 AM      Cardiac testin21   ECHO ADULT COMPLETE 2021 3/31/2021    Narrative · LV: Estimated LVEF is 25 - 30%. Normal wall thickness. Mildly dilated   left ventricle. Severely reduced systolic function. Abnormal left   ventricular septal motion consistent with left bundle branch block. Small   possible thrombus present in the left ventricle. Thrombus is located in   the apex. · MV: Severe mitral annular calcification. Moderate to severe mitral valve   regurgitation is present. · LA: Severely dilated left atrium. · TV: Moderate to severe tricuspid valve regurgitation is present. · PA: Severe pulmonary hypertension. Pulmonary arterial systolic pressure   is 85 mmHg. · RA: Mildly dilated right atrium. · AV: Mild to moderate aortic valve regurgitation is present. Signed by: Edouard Colunga MD     03/29/21   CARDIAC PROCEDURE 04/01/2021 4/1/2021    Narrative Findings:  1)Severe antive 3 vessel CAD  2)Patetn LIMA to LAD, VG to d1 to OM  3. Occluded native RCA with rPDA collateralized from left system. All   arteries are heavily calcified  4. Native distal LAD with 90% stenosis  5. S/p PCI of native distal LAS with 2.25 by 18 mm Xinece STEPHAN through LIMA   graft  6. Normal LVEDP    Access  Left radial: Severe calcification of subclavian. Tortuous    Contrast 41 cc    Recommendations  1)Plavix based DPAT. 2. Interval renal angiogram in On Monday if she is still here--otherwise   as outpatient. Not done to day to conserve contrast.  3. GDMT for CAD  4. Medical mgm for RCA disease.      Signed by: Mina Grullon MD                  [facility]  Past Medical History:   Diagnosis Date    Anxiety disorder     Atrial fibrillation ([address]     CAD (coronary artery disease) 2007    stents, CABG x 3v    Carotid stenosis     Cervical stenosis of spinal canal 07/2019    Chronic kidney disease     Cough     CVA (cerebral vascular accident) ([address] 07/2019    left lacunar infarct at head of caudate    Depression     AND CHRONIC ANXIETY    Diabetes ([address]     GERD (gastroesophageal reflux disease)     High cholesterol     History of peptic ulcer     Bleeding ulcer with increased NSAID use    Hypertension     Left carotid stenosis 07/2019    s/p left CEA with Dr. Olga Bolton, old 2007    PUD (peptic ulcer disease)     Stroke ([address]     Tremor     Valvular heart disease       Social Hx  Social History     Socioeconomic History    Marital status: SINGLE     Spouse name: Not on file    Number of children: Not on file    Years of education: Not on file    Highest education level: Not on file   Occupational History    Occupation: Retired realestate/teacher   Social Needs    Financial resource strain: Not on file    Food insecurity     Worry: Not on file     Inability: Not on file   [employer] needs     Medical: Not on file     Non-medical: Not on file   Tobacco Use    Smoking status: Former Smoker     Packs/day: 0.25     Years: 5.00     Pack years: 1.25     Types: Cigarettes     Quit date:      Years since quittin.2    Smokeless tobacco: Never Used   Substance and Sexual Activity    Alcohol use: Yes     Alcohol/week: 0.0 standard drinks     Comment: Rare    Drug use: No    Sexual activity: Not on file   Lifestyle    Physical activity     Days per week: Not on file     Minutes per session: Not on file    Stress: Not on file   Relationships    Social connections     Talks on phone: Not on file     Gets together: Not on file     Attends Ten[employer] service: Not on file     Active member of club or organization: Not on file     Attends meetings of clubs or organizations: Not on file     Relationship status: Not on file    Intimate partner violence     Fear of current or ex partner: Not on file     Emotionally abused: Not on file     Physically abused: Not on file     Forced sexual activity: Not on file   Other Topics Concern    Not on file   Social History Narrative    L[facility] in [facility]       Objective:   Physical Exam:                Visit Vitals  /71 (BP 1 Location: Right upper arm, BP Patient Position: At rest)   Pulse (!) 59   Temp 98.5 °F (36.9 °C)   Resp 18   Ht 5' 5\" (1.651 m)   Wt 148 lb 5.9 oz (67.3 kg)   SpO2 95%   BMI 24.69 kg/m²          General Appearance:   Well developed, pale, alert and oriented x 3, and   individual in no acute distress.    Ears/Nose/Mouth/Throat:    Hearing grossly normal. Neck:  Supple. Chest:    Lungs with left basilar crackles,   Cardiovascular:  Regular rate and rhythm, S1, S2 normal, no murmur. Abdomen:    Soft, non-tender, bowel sounds are active. Extremities:  no edema bilaterally. Skin:  Warm and dry. Pale. Telemetry: NSR with BBB currently.  No PAF            Data Review:    Labs:    Recent Results (from the past 24 hour(s))   POC ACTIVATED CLOTTING TIME    Collection Time: 04/01/21  2:23 PM   Result Value Ref Range    Activated Clotting Time (POC) 197 (H) 79 - 138 SECS   POC ACTIVATED CLOTTING TIME    Collection Time: 04/01/21  2:37 PM   Result Value Ref Range    Activated Clotting Time (POC) 378 (H) 79 - 138 SECS   EKG, 12 LEAD, INITIAL    Collection Time: 04/01/21  3:04 PM   Result Value Ref Range    Ventricular Rate 53 BPM    Atrial Rate 53 BPM    P-R Interval 170 ms    QRS Duration 116 ms    Q-T Interval 592 ms    QTC Calculation (Bezet) 555 ms    Calculated P Axis 78 degrees    Calculated R Axis -60 degrees    Calculated T Axis -170 degrees    Diagnosis       Sinus bradycardia  Left axis deviation  Septal infarct , age undetermined  T wave abnormality, consider anterior ischemia  Prolonged QT  When compared with ECG of 01-APR-2021 09:33,  No significant change was found  Confirmed by Gertrude Weller MD, Kitty Gu ([id number]) on 4/1/2021 3:35:37 PM     GLUCOSE, POC    Collection Time: 04/01/21  9:16 PM   Result Value Ref Range    Glucose (POC) 107 (H) 65 - 100 mg/dL    Performed by Mam[facility] Genre    METABOLIC PANEL, BASIC    Collection Time: 04/02/21  5:30 AM   Result Value Ref Range    Sodium 139 136 - 145 mmol/L    Potassium 4.1 3.5 - 5.1 mmol/L    Chloride 110 (H) 97 - 108 mmol/L    CO2 22 21 - 32 mmol/L    Anion gap 7 5 - 15 mmol/L    Glucose 79 65 - 100 mg/dL    BUN 25 (H) 6 - 20 MG/DL    Creatinine 1.61 (H) 0.55 - 1.02 MG/DL    BUN/Creatinine ratio 16 12 - 20      GFR est AA 37 (L) >60 ml/min/1.73m2    GFR est non-AA 31 (L) >60 ml/min/1.73m2    Calcium 8.6 8.5 - 10.1 MG/DL          Radiology:        Current Facility-Administered Medications   Medication Dose Route Frequency    cefTRIAXone (ROCEPHIN) 1 g in 0.9% sodium chloride (MBP/ADV) 50 mL MBP  1 g IntraVENous Q24H    sodium chloride (NS) flush 5-40 mL  5-40 mL IntraVENous Q8H    sodium chloride (NS) flush 5-40 mL  5-40 mL IntraVENous PRN    amLODIPine (NORVASC) tablet 5 mg  5 mg Oral DAILY    sodium chloride (NS) flush 5-40 mL  5-40 mL IntraVENous Q8H    sodium chloride (NS) flush 5-40 mL  5-40 mL IntraVENous PRN    acetaminophen (TYLENOL) tablet 650 mg  650 mg Oral Q6H PRN    Or    acetaminophen (TYLENOL) suppository 650 mg  650 mg Rectal Q6H PRN    polyethylene glycol (MIRALAX) packet 17 g  17 g Oral DAILY PRN    promethazine (PHENERGAN) tablet 12.5 mg  12.5 mg Oral Q6H PRN    Or    ondansetron (ZOFRAN) injection 4 mg  4 mg IntraVENous Q6H PRN    aspirin delayed-release tablet 81 mg  81 mg Oral QHS    atorvastatin (LIPITOR) tablet 40 mg  40 mg Oral QHS    carbidopa-levodopa (SINEMET)  mg per tablet 2 Tab  2 Tab Oral QID    clopidogreL (PLAVIX) tablet 75 mg  75 mg Oral DAILY AFTER BREAKFAST    DULoxetine (CYMBALTA) capsule 120 mg  120 mg Oral DAILY    pantoprazole (PROTONIX) tablet 40 mg  40 mg Oral ACB    hydrALAZINE (APRESOLINE) tablet 37.5 mg  37.5 mg Oral TID    hydrALAZINE (APRESOLINE) 20 mg/mL injection 20 mg  20 mg IntraVENous Q6H PRN          Og Eli. SUAD Sue     I concur with the above note, findings and assessment. She seems to be feeling slightly better in terms of her breathing and does not report any further chest discomfort. /71 (BP 1 Location: Right upper arm, BP Patient Position: At rest)   Pulse (!) 59   Temp 98.5 °F (36.9 °C)   Resp 18   Ht 5' 5\" (1.651 m)   Wt 148 lb 5.9 oz (67.3 kg)   SpO2 95%   BMI 24.69 kg/m²   General:    Alert, cooperative, no distress.    Psychiatric:    Normal Mood and affect    Eye/ENT:      Pupils equal, No asymmetry, Conjunctival pink. Able to hear voice at normal amplitude   Lungs:      Visibly symmetric chest expansion, No palpable tenderness. Basal crackls    Heart[de-identified]    Regular rate and rhythm, S1, S2 normal, no murmur, click, rub or gallop. No JVD, Normal palpable peripheral pulses. No cyanosis   Abdomen:     Soft, non-tender. Bowel sounds normal. No masses,  No      organomegaly. Extremities:   Extremities normal, atraumatic, no edema. Neurologic:   CN II-XII grossly intact. No gross focal deficits       Patient was seen with NP/PA, recent investigations were reviewed and treatment care/plan was formulated together. There was a concern on echocardiogram regarding possible LV thrombus. We will perform definitive study with with echocardiogram.  She will require more assistance than she currently has at home. May require anesthesia care during her discharge. If she is here through the weekend, will perform renal angiogram on Monday. She will definitively require diuretic therapy given dilated IVC on her echocardiogram and persistent clinical evidence of congestion. Particularly given underlying functional mitral and tricuspid regurgitation, lack of diuretics on board will put her at high risk of repeat. Reviewed her echocardiogram demonstrates significant dyssynchrony of the septum due to underlying left bundle branch block.   She may benefit from cardiac resynchronization therapy long-term but given recent PCI will have to wait for 3 months before she can receive CRT  Sarahi Yoon MD   4/2/2021  11:43 AM             [facility] [address] [place] [address], [address] [place] [address] [address]   [place] [address] [address] [facility]   [phone]

## 2021-04-02 NOTE — PROGRESS NOTES
12:23 Notified Dr. Bekah Giordano that patient has become hypotensive; she is asymptomatic. BPs 97/35, 95/48 after working with PT. He states he will look at meds to possibly adjust.    17:00 Occ stool specimen collected and sent to lab. PM hydralazine and carvedilol held per Dr. Bekah Giordano for hypotension following AM dose. 19:30 Bedside shift change report given to Megha (oncoming nurse) by Juan David Clifford (offgoing nurse). Report included the following information SBAR.

## 2021-04-02 NOTE — PROGRESS NOTES
[facility]   [address] [facility], [facility] [address], [facility]  Phone: [phone]   [phone]       Nephrology Progress Note  Willow Sauceda     [birth year]     [id number]  Date of Admission : 3/29/2021  04/02/21    CC: Follow up for ARF    Assessment and Plan   MORENO on CKD   - Etiology : progressive renovascular disease   - Cr stable post [facility]-- stopped IVF   - resume Bumex 2 mg po daily tomorrow   - labs daily   - strict I. O      Left BATOOL :  - MRA confirmed severe / complete Left RA occlusion   - for  Left renal angio on Monday   - right Kidney ok     Infra renal AAA  - 3.9 cm   - follow up w/ vascular as out pt     CKD 3  - baseline Cr ~ 1.4-1.5 mg/dl   - renal US: progressive Left > R renal atrophy. Benign cysts   - presumed to be 2/2 DM, HTN      Mild acidosis :  - 2/2 CRF. Watch for now      Pulm edema/ Effusions/ CHF  CAD s/p CABG 2015  - diuresis as above   - Echo EF 25%   - [facility] 4/1: patent grafts     Parkinson Dz      NCNC Anemia   - ? 2/2 CKD   - normal iron, B12, folate   - f/u Myeloma screen      PVD  S/P Left CEA         Care Plan discussed with:  Pt        Interval History:  [facility] noted   Cr at 1.6   Denies any new sx   Could not get Renal angio yesterday to limit contrast load     Review of Systems: A comprehensive review of systems was negative except for that written in the HPI.     Current Medications:   Current Facility-Administered Medications   Medication Dose Route Frequency    cefTRIAXone (ROCEPHIN) 1 g in 0.9% sodium chloride (MBP/ADV) 50 mL MBP  1 g IntraVENous Q24H    0.9% sodium chloride infusion 500 mL  500 mL IntraVENous CONTINUOUS    sodium chloride (NS) flush 5-40 mL  5-40 mL IntraVENous Q8H    sodium chloride (NS) flush 5-40 mL  5-40 mL IntraVENous PRN    amLODIPine (NORVASC) tablet 5 mg  5 mg Oral DAILY    sodium chloride (NS) flush 5-40 mL  5-40 mL IntraVENous Q8H    sodium chloride (NS) flush 5-40 mL  5-40 mL IntraVENous PRN    acetaminophen (TYLENOL) tablet 650 mg  650 mg Oral Q6H PRN    Or    acetaminophen (TYLENOL) suppository 650 mg  650 mg Rectal Q6H PRN    polyethylene glycol (MIRALAX) packet 17 g  17 g Oral DAILY PRN    promethazine (PHENERGAN) tablet 12.5 mg  12.5 mg Oral Q6H PRN    Or    ondansetron (ZOFRAN) injection 4 mg  4 mg IntraVENous Q6H PRN    aspirin delayed-release tablet 81 mg  81 mg Oral QHS    atorvastatin (LIPITOR) tablet 40 mg  40 mg Oral QHS    carbidopa-levodopa (SINEMET)  mg per tablet 2 Tab  2 Tab Oral QID    clopidogreL (PLAVIX) tablet 75 mg  75 mg Oral DAILY AFTER BREAKFAST    DULoxetine (CYMBALTA) capsule 120 mg  120 mg Oral DAILY    pantoprazole (PROTONIX) tablet 40 mg  40 mg Oral ACB    hydrALAZINE (APRESOLINE) tablet 37.5 mg  37.5 mg Oral TID    hydrALAZINE (APRESOLINE) 20 mg/mL injection 20 mg  20 mg IntraVENous Q6H PRN      No Known Allergies    Objective:  Vitals:    Vitals:    04/01/21 2316 04/01/21 2350 04/02/21 0338 04/02/21 0740   BP: (!) 114/46  (!) 124/46 137/71   Pulse: 61  60 66   Resp: 18  17 18   Temp: 98 °F (36.7 °C)  98.3 °F (36.8 °C) 98.5 °F (36.9 °C)   SpO2: 93% 94% 95% 95%   Weight:   67.3 kg (148 lb 5.9 oz)    Height:         Intake and Output:  No intake/output data recorded. 03/31 1901 - 04/02 0700  In: 1575.8 [P.O.:530; I.V.:1045.8]  Out: 600 [Urine:600]    Physical Examination:  General:  Appears stated age   HEENT: PERRL,  ++ Pallor , No Icterus  Neck: Supple,no mass palpable  Lungs :CTA  CVS: RRR, S1 S2 normal, No murmur   Abdomen: Soft, NT, BS +  Extremities: trace Edema, tender to touch   Skin: discoloration in both legs .   MS: No joint swelling, erythema, warmth  Neurologic: non focal, AAO x 3  Psych: anxious     []    High complexity decision making was performed  []    Patient is at high-risk of decompensation with multiple organ involvement    Lab Data Personally Reviewed: I have reviewed all the pertinent labs, microbiology data and radiology studies during assessment. Recent Labs     04/02/21 0530 04/01/21 0532 03/31/21  0523    140 137   K 4.1 3.8 4.3   * 110* 107   CO2 22 22 18*   GLU 79 100 229*   BUN 25* 24* 30*   CREA 1.61* 1.70* 2.31*   CA 8.6 8.7 9.0   MG  --   --  2.0   ALB  --   --  3.6   ALT  --   --  <6*     Recent Labs     04/01/21 0532 03/31/21 0523   WBC 6.2 12.0*   HGB 9.3* 11.6   HCT 28.5* 36.8    322     No results found for: SDES  Lab Results   Component Value Date/Time    Culture result: MRSA NOT PRESENT 12/26/2018 09:29 PM    Culture result:  12/26/2018 09:29 PM         Screening of patient nares for MRSA is for surveillance purposes and, if positive, to facilitate isolation considerations in high risk settings. It is not intended for automatic decolonization interventions per se as regimens are not sufficiently effective to warrant routine use. Culture result: MRSA PRESENT 02/09/2015 06:30 PM    Culture result:  02/09/2015 06:30 PM         Screening of patient nares for MRSA is for surveillance purposes and, if positive, to facilitate isolation considerations in high risk settings. It is not intended for automatic decolonization interventions per se as regimens are not sufficiently effective to warrant routine use. Culture result: MRSA PRESENT 09/09/2014 05:02 PM    Culture result:  09/09/2014 05:02 PM         Screening of patient nares for MRSA is for surveillance purposes and, if positive, to facilitate isolation considerations in high risk settings. It is not intended for automatic decolonization interventions per se as regimens are not sufficiently effective to warrant routine use.      Recent Results (from the past 24 hour(s))   EKG, 12 LEAD, INITIAL    Collection Time: 04/01/21  9:33 AM   Result Value Ref Range    Ventricular Rate 55 BPM    Atrial Rate 55 BPM    P-R Interval 174 ms    QRS Duration 118 ms    Q-T Interval 574 ms    QTC Calculation (Bezet) 549 ms    Calculated P Axis 20 degrees    Calculated R Axis -60 degrees    Calculated T Axis 123 degrees    Diagnosis       Sinus bradycardia  Left axis deviation  Incomplete left bundle branch block  Marked T wave abnormality, consider anterolateral ischemia  Prolonged QT  Abnormal ECG  When compared with ECG of 31-MAR-2021 08:08,  Incomplete left bundle branch block has replaced Left bundle branch block  Confirmed by Aneudy Avalos MD, [facility] ([id number][facility] on 4/1/2021 11:13:14 AM     POC ACTIVATED CLOTTING TIME    Collection Time: 04/01/21  2:23 PM   Result Value Ref Range    Activated Clotting Time (POC) 197 (H) 79 - 138 SECS   POC ACTIVATED CLOTTING TIME    Collection Time: 04/01/21  2:37 PM   Result Value Ref Range    Activated Clotting Time (POC) 378 (H) 79 - 138 SECS   EKG, 12 LEAD, INITIAL    Collection Time: 04/01/21  3:04 PM   Result Value Ref Range    Ventricular Rate 53 BPM    Atrial Rate 53 BPM    P-R Interval 170 ms    QRS Duration 116 ms    Q-T Interval 592 ms    QTC Calculation (Bezet) 555 ms    Calculated P Axis 78 degrees    Calculated R Axis -60 degrees    Calculated T Axis -170 degrees    Diagnosis       Sinus bradycardia  Left axis deviation  Septal infarct , age undetermined  T wave abnormality, consider anterior ischemia  Prolonged QT  When compared with ECG of 01-APR-2021 09:33,  No significant change was found  Confirmed by Aneudy Avalos MD, [facility] ([id number]) on 4/1/2021 3:35:37 PM     GLUCOSE, POC    Collection Time: 04/01/21  9:16 PM   Result Value Ref Range    Glucose (POC) 107 (H) 65 - 100 mg/dL    Performed by [facility]    METABOLIC PANEL, BASIC    Collection Time: 04/02/21  5:30 AM   Result Value Ref Range    Sodium 139 136 - 145 mmol/L    Potassium 4.1 3.5 - 5.1 mmol/L    Chloride 110 (H) 97 - 108 mmol/L    CO2 22 21 - 32 mmol/L    Anion gap 7 5 - 15 mmol/L    Glucose 79 65 - 100 mg/dL    BUN 25 (H) 6 - 20 MG/DL    Creatinine 1.61 (H) 0.55 - 1.02 MG/DL    BUN/Creatinine ratio 16 12 - 20      GFR est AA 37 (L) >60 ml/min/1.73m2    GFR est non-AA 31 (L) >60 ml/min/1.73m2    Calcium 8.6 8.5 - 10.1 MG/DL           Total time spent with patient:  xxx   min. Care Plan discussed with:  Patient     Family      RN      Consulting Physician [id number] [facility],         I have reviewed the flowsheets. Chart and Pertinent Notes have been reviewed. No change in PMH ,family and social history from Consult note.       Angy Magallanes MD

## 2021-04-02 NOTE — PROGRESS NOTES
1930: Bedside shift change report given to Veronica Monaco Dr (oncoming nurse) by Dutch Garza (offgoing nurse). Report included the following information SBAR, Kardex, Intake/Output, MAR and Cardiac Rhythm SB/NSR.     0730: Bedside shift change report given to Dutch Garza (oncoming nurse) by Zoe Dunlap RN (offgoing nurse). Report included the following information SBAR, Kardex, Intake/Output, MAR and Cardiac Rhythm SB/NSR.

## 2021-04-03 ENCOUNTER — APPOINTMENT (OUTPATIENT)
Dept: NON INVASIVE DIAGNOSTICS | Age: 81
DRG: 246 | End: 2021-04-03
Attending: NURSE PRACTITIONER
Payer: MEDICARE

## 2021-04-03 LAB
ANION GAP SERPL CALC-SCNC: 9 MMOL/L (ref 5–15)
BASOPHILS # BLD: 0 K/UL (ref 0–0.1)
BASOPHILS NFR BLD: 1 % (ref 0–1)
BUN SERPL-MCNC: 25 MG/DL (ref 6–20)
BUN/CREAT SERPL: 17 (ref 12–20)
CALCIUM SERPL-MCNC: 8 MG/DL (ref 8.5–10.1)
CHLORIDE SERPL-SCNC: 109 MMOL/L (ref 97–108)
CO2 SERPL-SCNC: 20 MMOL/L (ref 21–32)
CREAT SERPL-MCNC: 1.46 MG/DL (ref 0.55–1.02)
DIFFERENTIAL METHOD BLD: ABNORMAL
EOSINOPHIL # BLD: 0.3 K/UL (ref 0–0.4)
EOSINOPHIL NFR BLD: 5 % (ref 0–7)
ERYTHROCYTE [DISTWIDTH] IN BLOOD BY AUTOMATED COUNT: 16.9 % (ref 11.5–14.5)
GLUCOSE SERPL-MCNC: 113 MG/DL (ref 65–100)
HCT VFR BLD AUTO: 26 % (ref 35–47)
HGB BLD-MCNC: 8.1 G/DL (ref 11.5–16)
IMM GRANULOCYTES # BLD AUTO: 0 K/UL (ref 0–0.04)
IMM GRANULOCYTES NFR BLD AUTO: 0 % (ref 0–0.5)
LYMPHOCYTES # BLD: 0.9 K/UL (ref 0.8–3.5)
LYMPHOCYTES NFR BLD: 15 % (ref 12–49)
MAGNESIUM SERPL-MCNC: 1.9 MG/DL (ref 1.6–2.4)
MCH RBC QN AUTO: 30.3 PG (ref 26–34)
MCHC RBC AUTO-ENTMCNC: 31.2 G/DL (ref 30–36.5)
MCV RBC AUTO: 97.4 FL (ref 80–99)
MONOCYTES # BLD: 0.6 K/UL (ref 0–1)
MONOCYTES NFR BLD: 10 % (ref 5–13)
NEUTS SEG # BLD: 4.2 K/UL (ref 1.8–8)
NEUTS SEG NFR BLD: 69 % (ref 32–75)
NRBC # BLD: 0 K/UL (ref 0–0.01)
NRBC BLD-RTO: 0 PER 100 WBC
PLATELET # BLD AUTO: 184 K/UL (ref 150–400)
PMV BLD AUTO: 8.6 FL (ref 8.9–12.9)
POTASSIUM SERPL-SCNC: 4.2 MMOL/L (ref 3.5–5.1)
RBC # BLD AUTO: 2.67 M/UL (ref 3.8–5.2)
SODIUM SERPL-SCNC: 138 MMOL/L (ref 136–145)
WBC # BLD AUTO: 6 K/UL (ref 3.6–11)

## 2021-04-03 PROCEDURE — 85025 COMPLETE CBC W/AUTO DIFF WBC: CPT

## 2021-04-03 PROCEDURE — 74011250637 HC RX REV CODE- 250/637: Performed by: NURSE PRACTITIONER

## 2021-04-03 PROCEDURE — 65660000000 HC RM CCU STEPDOWN

## 2021-04-03 PROCEDURE — 36415 COLL VENOUS BLD VENIPUNCTURE: CPT

## 2021-04-03 PROCEDURE — 74011250636 HC RX REV CODE- 250/636: Performed by: FAMILY MEDICINE

## 2021-04-03 PROCEDURE — C8923 2D TTE W OR W/O FOL W/CON,CO: HCPCS

## 2021-04-03 PROCEDURE — 74011000258 HC RX REV CODE- 258: Performed by: FAMILY MEDICINE

## 2021-04-03 PROCEDURE — 93321 DOPPLER ECHO F-UP/LMTD STD: CPT | Performed by: INTERNAL MEDICINE

## 2021-04-03 PROCEDURE — 93325 DOPPLER ECHO COLOR FLOW MAPG: CPT | Performed by: INTERNAL MEDICINE

## 2021-04-03 PROCEDURE — 83735 ASSAY OF MAGNESIUM: CPT

## 2021-04-03 PROCEDURE — 99233 SBSQ HOSP IP/OBS HIGH 50: CPT | Performed by: INTERNAL MEDICINE

## 2021-04-03 PROCEDURE — 74011250637 HC RX REV CODE- 250/637: Performed by: INTERNAL MEDICINE

## 2021-04-03 PROCEDURE — 80048 BASIC METABOLIC PNL TOTAL CA: CPT

## 2021-04-03 PROCEDURE — 93308 TTE F-UP OR LMTD: CPT | Performed by: INTERNAL MEDICINE

## 2021-04-03 PROCEDURE — 74011250637 HC RX REV CODE- 250/637: Performed by: SPECIALIST

## 2021-04-03 RX ORDER — BUMETANIDE 1 MG/1
2 TABLET ORAL DAILY
Status: DISCONTINUED | OUTPATIENT
Start: 2021-04-03 | End: 2021-04-06

## 2021-04-03 RX ADMIN — ATORVASTATIN CALCIUM 40 MG: 40 TABLET, FILM COATED ORAL at 23:28

## 2021-04-03 RX ADMIN — CEFTRIAXONE SODIUM 1 G: 1 INJECTION, POWDER, FOR SOLUTION INTRAMUSCULAR; INTRAVENOUS at 13:03

## 2021-04-03 RX ADMIN — Medication 10 ML: at 07:08

## 2021-04-03 RX ADMIN — Medication 10 ML: at 23:25

## 2021-04-03 RX ADMIN — CARBIDOPA AND LEVODOPA 2 TABLET: 25; 100 TABLET ORAL at 09:14

## 2021-04-03 RX ADMIN — AMLODIPINE BESYLATE 5 MG: 5 TABLET ORAL at 09:14

## 2021-04-03 RX ADMIN — DULOXETINE HYDROCHLORIDE 120 MG: 60 CAPSULE, DELAYED RELEASE ORAL at 09:14

## 2021-04-03 RX ADMIN — Medication 10 ML: at 07:09

## 2021-04-03 RX ADMIN — BUMETANIDE 2 MG: 1 TABLET ORAL at 09:20

## 2021-04-03 RX ADMIN — CLOPIDOGREL BISULFATE 75 MG: 75 TABLET ORAL at 09:14

## 2021-04-03 RX ADMIN — CARBIDOPA AND LEVODOPA 2 TABLET: 25; 100 TABLET ORAL at 13:03

## 2021-04-03 RX ADMIN — PANTOPRAZOLE SODIUM 40 MG: 40 TABLET, DELAYED RELEASE ORAL at 07:08

## 2021-04-03 RX ADMIN — HYDRALAZINE HYDROCHLORIDE 37.5 MG: 25 TABLET, FILM COATED ORAL at 09:14

## 2021-04-03 RX ADMIN — CARBIDOPA AND LEVODOPA 2 TABLET: 25; 100 TABLET ORAL at 23:26

## 2021-04-03 RX ADMIN — HYDRALAZINE HYDROCHLORIDE 37.5 MG: 25 TABLET, FILM COATED ORAL at 23:26

## 2021-04-03 RX ADMIN — ASPIRIN 81 MG: 81 TABLET, COATED ORAL at 23:26

## 2021-04-03 RX ADMIN — HYDRALAZINE HYDROCHLORIDE 37.5 MG: 25 TABLET, FILM COATED ORAL at 17:10

## 2021-04-03 RX ADMIN — Medication 10 ML: at 13:21

## 2021-04-03 RX ADMIN — CARVEDILOL 3.12 MG: 3.12 TABLET, FILM COATED ORAL at 09:15

## 2021-04-03 RX ADMIN — PERFLUTREN 2 ML: 6.52 INJECTION, SUSPENSION INTRAVENOUS at 10:38

## 2021-04-03 RX ADMIN — CARBIDOPA AND LEVODOPA 2 TABLET: 25; 100 TABLET ORAL at 17:10

## 2021-04-03 NOTE — PROGRESS NOTES
[facility]   [address] [facility], [facility] [address], [facility]  Phone: [phone]   [phone]       Nephrology Progress Note  Maria R Aquino     [birth year]     [id number]  Date of Admission : 3/29/2021  04/03/21    CC: Follow up for ARF    Assessment and Plan   MORENO on CKD   - Etiology : progressive renovascular disease   - Cr stable/ better post [facility]-   - continue Bumex 2 mg po daily from today  - labs daily   - strict I. O      Left BATOOL :  - MRA confirmed severe / complete Left RA occlusion   - for  Left renal angio on Monday   - right Kidney ok     Infra renal AAA  - 3.9 cm   - follow up w/ vascular as out pt     CKD 3  - baseline Cr ~ 1.4-1.5 mg/dl   - renal US: progressive Left > R renal atrophy. Benign cysts   - presumed to be 2/2 DM, HTN      Mild acidosis :  - 2/2 CRF. Watch for now      Pulm edema/ Effusions/ Systolic HF  CAD s/p CABG 2015  - diuresis as above   - Echo EF 25%   - [facility] 4/1: patent grafts     Parkinson Dz      NCNC Anemia   - ? 2/2 CKD   - normal iron, B12, folate   - f/u Myeloma screen      PVD  S/P Left CEA         Care Plan discussed with:  Pt        Interval History:  [facility] noted   Cr at 1.45  Denies any new sx       Review of Systems: A comprehensive review of systems was negative except for that written in the HPI.     Current Medications:   Current Facility-Administered Medications   Medication Dose Route Frequency    bumetanide (BUMEX) tablet 2 mg  2 mg Oral DAILY    carvediloL (COREG) tablet 3.125 mg  3.125 mg Oral BID WITH MEALS    cefTRIAXone (ROCEPHIN) 1 g in 0.9% sodium chloride (MBP/ADV) 50 mL MBP  1 g IntraVENous Q24H    sodium chloride (NS) flush 5-40 mL  5-40 mL IntraVENous Q8H    sodium chloride (NS) flush 5-40 mL  5-40 mL IntraVENous PRN    amLODIPine (NORVASC) tablet 5 mg  5 mg Oral DAILY    sodium chloride (NS) flush 5-40 mL  5-40 mL IntraVENous Q8H    sodium chloride (NS) flush 5-40 mL  5-40 mL IntraVENous PRN    acetaminophen (TYLENOL) tablet 650 mg  650 mg Oral Q6H PRN    Or    acetaminophen (TYLENOL) suppository 650 mg  650 mg Rectal Q6H PRN    polyethylene glycol (MIRALAX) packet 17 g  17 g Oral DAILY PRN    promethazine (PHENERGAN) tablet 12.5 mg  12.5 mg Oral Q6H PRN    Or    ondansetron (ZOFRAN) injection 4 mg  4 mg IntraVENous Q6H PRN    aspirin delayed-release tablet 81 mg  81 mg Oral QHS    atorvastatin (LIPITOR) tablet 40 mg  40 mg Oral QHS    carbidopa-levodopa (SINEMET)  mg per tablet 2 Tab  2 Tab Oral QID    clopidogreL (PLAVIX) tablet 75 mg  75 mg Oral DAILY AFTER BREAKFAST    DULoxetine (CYMBALTA) capsule 120 mg  120 mg Oral DAILY    pantoprazole (PROTONIX) tablet 40 mg  40 mg Oral ACB    hydrALAZINE (APRESOLINE) tablet 37.5 mg  37.5 mg Oral TID    hydrALAZINE (APRESOLINE) 20 mg/mL injection 20 mg  20 mg IntraVENous Q6H PRN      No Known Allergies    Objective:  Vitals:    Vitals:    04/03/21 0103 04/03/21 0457 04/03/21 0734 04/03/21 1022   BP:  (!) 154/108 (!) 174/41 (!) 174/41   Pulse:  63 60    Resp:  16 20    Temp:  98.2 °F (36.8 °C) 98.5 °F (36.9 °C)    SpO2:   99%    Weight: 67.1 kg (147 lb 14.9 oz)   66.7 kg (147 lb)   Height:    5' 5\" (1.651 m)     Intake and Output:  No intake/output data recorded. 04/01 1901 - 04/03 0700  In: 2320.8 [P.O.:1175; I.V.:1145.8]  Out: 150 [Urine:150]    Physical Examination:  General:  Appears stated age   HEENT: PERRL,  ++ Pallor , No Icterus  Neck: Supple,no mass palpable  Lungs :CTA  CVS: RRR, S1 S2 normal, No murmur   Abdomen: Soft, NT, BS +  Extremities: trace Edema, tender to touch   Skin: discoloration in both legs .   MS: No joint swelling, erythema, warmth  Neurologic: non focal, AAO x 3  Psych: anxious     []    High complexity decision making was performed  []    Patient is at high-risk of decompensation with multiple organ involvement    Lab Data Personally Reviewed: I have reviewed all the pertinent labs, microbiology data and radiology studies during assessment. Recent Labs     04/03/21  0505 04/02/21  1036 04/02/21  0530 04/01/21  0532    138 139 140   K 4.2 3.8 4.1 3.8   * 111* 110* 110*   CO2 20* 20* 22 22   * 154* 79 100   BUN 25* 25* 25* 24*   CREA 1.46* 1.70* 1.61* 1.70*   CA 8.0* 8.4* 8.6 8.7   PHOS  --  2.7  --   --    ALB  --  2.8*  --   --      Recent Labs     04/03/21  0505 04/02/21  1036 04/01/21  0532   WBC 6.0 4.8 6.2   HGB 8.1* 8.0* 9.3*   HCT 26.0* 25.6* 28.5*    186 237     No results found for: SDES  Lab Results   Component Value Date/Time    Culture result: GRAM NEGATIVE RODS (A) 04/02/2021 04:05 AM    Culture result: MRSA NOT PRESENT 12/26/2018 09:29 PM    Culture result:  12/26/2018 09:29 PM         Screening of patient nares for MRSA is for surveillance purposes and, if positive, to facilitate isolation considerations in high risk settings. It is not intended for automatic decolonization interventions per se as regimens are not sufficiently effective to warrant routine use. Culture result: MRSA PRESENT 02/09/2015 06:30 PM    Culture result:  02/09/2015 06:30 PM         Screening of patient nares for MRSA is for surveillance purposes and, if positive, to facilitate isolation considerations in high risk settings. It is not intended for automatic decolonization interventions per se as regimens are not sufficiently effective to warrant routine use.      Recent Results (from the past 24 hour(s))   GLUCOSE, POC    Collection Time: 04/02/21 11:45 AM   Result Value Ref Range    Glucose (POC) 138 (H) 65 - 100 mg/dL    Performed by Camelia Berumen  PCT    OCCULT BLOOD, STOOL    Collection Time: 04/02/21  4:50 PM   Result Value Ref Range    Occult blood, stool Negative NEG     METABOLIC PANEL, BASIC    Collection Time: 04/03/21  5:05 AM   Result Value Ref Range    Sodium 138 136 - 145 mmol/L    Potassium 4.2 3.5 - 5.1 mmol/L    Chloride 109 (H) 97 - 108 mmol/L    CO2 20 (L) 21 - 32 mmol/L    Anion gap 9 5 - 15 mmol/L Glucose 113 (H) 65 - 100 mg/dL    BUN 25 (H) 6 - 20 MG/DL    Creatinine 1.46 (H) 0.55 - 1.02 MG/DL    BUN/Creatinine ratio 17 12 - 20      GFR est AA 42 (L) >60 ml/min/1.73m2    GFR est non-AA 35 (L) >60 ml/min/1.73m2    Calcium 8.0 (L) 8.5 - 10.1 MG/DL   CBC WITH AUTOMATED DIFF    Collection Time: 04/03/21  5:05 AM   Result Value Ref Range    WBC 6.0 3.6 - 11.0 K/uL    RBC 2.67 (L) 3.80 - 5.20 M/uL    HGB 8.1 (L) 11.5 - 16.0 g/dL    HCT 26.0 (L) 35.0 - 47.0 %    MCV 97.4 80.0 - 99.0 FL    MCH 30.3 26.0 - 34.0 PG    MCHC 31.2 30.0 - 36.5 g/dL    RDW 16.9 (H) 11.5 - 14.5 %    PLATELET 630 922 - 938 K/uL    MPV 8.6 (L) 8.9 - 12.9 FL    NRBC 0.0 0  WBC    ABSOLUTE NRBC 0.00 0.00 - 0.01 K/uL    NEUTROPHILS 69 32 - 75 %    LYMPHOCYTES 15 12 - 49 %    MONOCYTES 10 5 - 13 %    EOSINOPHILS 5 0 - 7 %    BASOPHILS 1 0 - 1 %    IMMATURE GRANULOCYTES 0 0.0 - 0.5 %    ABS. NEUTROPHILS 4.2 1.8 - 8.0 K/UL    ABS. LYMPHOCYTES 0.9 0.8 - 3.5 K/UL    ABS. MONOCYTES 0.6 0.0 - 1.0 K/UL    ABS. EOSINOPHILS 0.3 0.0 - 0.4 K/UL    ABS. BASOPHILS 0.0 0.0 - 0.1 K/UL    ABS. IMM. GRANS. 0.0 0.00 - 0.04 K/UL    DF AUTOMATED             Total time spent with patient:  xxx   min. Care Plan discussed with:  Patient     Family      RN      Consulting Physician [id number],         I have reviewed the flowsheets. Chart and Pertinent Notes have been reviewed. No change in PMH ,family and social history from Consult note.       Alexandria Palomino MD

## 2021-04-03 NOTE — PROGRESS NOTES
[address] [facility]  Hospitalist Group                                                                                          Hospitalist Progress Note  Edmar Steele MD  Answering service: [phone] OR [phone] from in house phone              Progress Note    Patient: Arlette Rao MRN: [id number]  SSN: [id number]    YOB: 1940  Age: 79 y.o. Sex: female      Admit Date: 3/29/2021    LOS: 5 days     Subjective:     Patient presents with acute on chronic CHF as well as A. fib with RVR. Breathing is improving and currently rate is controlled. Patient has no acute complaint today. S/p cardiac cath with placement of stent to LAD. Overall doing well. Objective:     Vitals:    04/03/21 0457 04/03/21 0734 04/03/21 1022 04/03/21 1248   BP: (!) 154/108 (!) 174/41 (!) 174/41 (!) 167/53   Pulse: 63 60  71   Resp: 16 20  18   Temp: 98.2 °F (36.8 °C) 98.5 °F (36.9 °C)  99.9 °F (37.7 °C)   SpO2:  99%  98%   Weight:   66.7 kg (147 lb)    Height:   5' 5\" (1.651 m)         Intake and Output:  Current Shift: No intake/output data recorded. Last three shifts: 04/01 1901 - 04/03 0700  In: 2320.8 [P.O.:1175; I.V.:1145.8]  Out: 150 [Urine:150]    Physical Exam:   GENERAL: alert, cooperative, no distress, appears stated age  THROAT & NECK: normal and no erythema or exudates noted. LUNG: clear to auscultation bilaterally  HEART: regular rate and rhythm, S1, S2 normal, no murmur, click, rub or gallop  ABDOMEN: soft, non-tender. Bowel sounds normal. No masses,  no organomegaly  EXTREMITIES:  extremities normal, atraumatic, no cyanosis or edema  SKIN: no rash or abnormalities  NEUROLOGIC: AOx3. PSYCHIATRIC: non focal    Lab/Data Review: All lab results for the last 24 hours reviewed.      Recent Results (from the past 24 hour(s))   OCCULT BLOOD, STOOL    Collection Time: 04/02/21  4:50 PM   Result Value Ref Range    Occult blood, stool Negative NEG     METABOLIC PANEL, BASIC    Collection Time: 04/03/21  5:05 AM   Result Value Ref Range    Sodium 138 136 - 145 mmol/L    Potassium 4.2 3.5 - 5.1 mmol/L    Chloride 109 (H) 97 - 108 mmol/L    CO2 20 (L) 21 - 32 mmol/L    Anion gap 9 5 - 15 mmol/L    Glucose 113 (H) 65 - 100 mg/dL    BUN 25 (H) 6 - 20 MG/DL    Creatinine 1.46 (H) 0.55 - 1.02 MG/DL    BUN/Creatinine ratio 17 12 - 20      GFR est AA 42 (L) >60 ml/min/1.73m2    GFR est non-AA 35 (L) >60 ml/min/1.73m2    Calcium 8.0 (L) 8.5 - 10.1 MG/DL   CBC WITH AUTOMATED DIFF    Collection Time: 04/03/21  5:05 AM   Result Value Ref Range    WBC 6.0 3.6 - 11.0 K/uL    RBC 2.67 (L) 3.80 - 5.20 M/uL    HGB 8.1 (L) 11.5 - 16.0 g/dL    HCT 26.0 (L) 35.0 - 47.0 %    MCV 97.4 80.0 - 99.0 FL    MCH 30.3 26.0 - 34.0 PG    MCHC 31.2 30.0 - 36.5 g/dL    RDW 16.9 (H) 11.5 - 14.5 %    PLATELET 901 761 - 000 K/uL    MPV 8.6 (L) 8.9 - 12.9 FL    NRBC 0.0 0  WBC    ABSOLUTE NRBC 0.00 0.00 - 0.01 K/uL    NEUTROPHILS 69 32 - 75 %    LYMPHOCYTES 15 12 - 49 %    MONOCYTES 10 5 - 13 %    EOSINOPHILS 5 0 - 7 %    BASOPHILS 1 0 - 1 %    IMMATURE GRANULOCYTES 0 0.0 - 0.5 %    ABS. NEUTROPHILS 4.2 1.8 - 8.0 K/UL    ABS. LYMPHOCYTES 0.9 0.8 - 3.5 K/UL    ABS. MONOCYTES 0.6 0.0 - 1.0 K/UL    ABS. EOSINOPHILS 0.3 0.0 - 0.4 K/UL    ABS. BASOPHILS 0.0 0.0 - 0.1 K/UL    ABS. IMM.  GRANS. 0.0 0.00 - 0.04 K/UL    DF AUTOMATED     MAGNESIUM    Collection Time: 04/03/21  5:05 AM   Result Value Ref Range    Magnesium 1.9 1.6 - 2.4 mg/dL        Imaging:           Assessment and Plan:     Congestive heart failure  -Acute systolic congestive heart failure NYHA class III, echo with EF of 25-30, new onset  -Cardiology following    Coronary artery disease  -It is post cardiac cath 4/1, s/p PCI with STEPHAN to LAD  -Continue dual antiplatelet  -Cardiology following    LV thrombus  -Noted on echocardiogram  -Plan for limited echo to rule out LV thrombus    Diaphoresis  -Low probability for PE on V/Q  -Oxygenation improving and patient clinically improved    Atrial fibrillation with RVR  -Rate is now controlled, continue Coreg  -Not on anticoagulation at current    Valvular heart disease  -Moderate to severe mitral and tricuspid regurgitation on echo  -Plan per cardiology    MORENO  -MORENO on CKD stage III  -Improving renal function  -Nephrology following  -Resume diuretic today    Renal artery stenosis  -Left renal artery high-grade stenosis and mild stenosis of the right renal artery on MRA of the abdomen  -Plan for renal angiogram on Monday    Aortic aneurysm  -Infrarenal aortic aneurysm of 3.9 cm, incidental finding on MRA of the abdomen  -Outpatient follow-up     Urinary tract infection  -Urinalysis  showing pyuria  -Continue Rocephin and follow urine culture    Hypertension  -Continue amlodipine, Coreg and hydralazine  -Blood pressure is stable    Parkinson disease   -Continue Sinemet    Anemia  -Anemia of chronic kidney disease  -H&H is stable    Discharge disposition: Likely more than 48 hours pending progress. PT evaluation.     Signed By: Ramirez Lai MD     April 3, 2021

## 2021-04-03 NOTE — PROGRESS NOTES
0730: Bedside shift change report received by Partha Delatorre (offgoing nurse). Report included the following information SBAR, Kardex, Procedure Summary, Intake/Output, MAR, Recent Results and Cardiac Rhythm SR/SB. 1930: Bedside shift change report given to Partha Delatorre (oncoming nurse) . Report included the following information SBAR, Kardex, Procedure Summary, Intake/Output, MAR, Recent Results and Cardiac Rhythm SR/SB.

## 2021-04-04 LAB
ANION GAP SERPL CALC-SCNC: 5 MMOL/L (ref 5–15)
BACTERIA SPEC CULT: ABNORMAL
BASOPHILS # BLD: 0 K/UL (ref 0–0.1)
BASOPHILS NFR BLD: 1 % (ref 0–1)
BUN SERPL-MCNC: 24 MG/DL (ref 6–20)
BUN/CREAT SERPL: 16 (ref 12–20)
CALCIUM SERPL-MCNC: 8.7 MG/DL (ref 8.5–10.1)
CC UR VC: ABNORMAL
CHLORIDE SERPL-SCNC: 109 MMOL/L (ref 97–108)
CO2 SERPL-SCNC: 23 MMOL/L (ref 21–32)
CREAT SERPL-MCNC: 1.54 MG/DL (ref 0.55–1.02)
DIFFERENTIAL METHOD BLD: ABNORMAL
EOSINOPHIL # BLD: 0.3 K/UL (ref 0–0.4)
EOSINOPHIL NFR BLD: 6 % (ref 0–7)
ERYTHROCYTE [DISTWIDTH] IN BLOOD BY AUTOMATED COUNT: 15.9 % (ref 11.5–14.5)
GLUCOSE SERPL-MCNC: 109 MG/DL (ref 65–100)
HCT VFR BLD AUTO: 27.9 % (ref 35–47)
HGB BLD-MCNC: 9 G/DL (ref 11.5–16)
IMM GRANULOCYTES # BLD AUTO: 0 K/UL (ref 0–0.04)
IMM GRANULOCYTES NFR BLD AUTO: 0 % (ref 0–0.5)
LYMPHOCYTES # BLD: 0.9 K/UL (ref 0.8–3.5)
LYMPHOCYTES NFR BLD: 18 % (ref 12–49)
MCH RBC QN AUTO: 31.6 PG (ref 26–34)
MCHC RBC AUTO-ENTMCNC: 32.3 G/DL (ref 30–36.5)
MCV RBC AUTO: 97.9 FL (ref 80–99)
MONOCYTES # BLD: 0.6 K/UL (ref 0–1)
MONOCYTES NFR BLD: 12 % (ref 5–13)
NEUTS SEG # BLD: 3.1 K/UL (ref 1.8–8)
NEUTS SEG NFR BLD: 63 % (ref 32–75)
NRBC # BLD: 0 K/UL (ref 0–0.01)
NRBC BLD-RTO: 0 PER 100 WBC
PLATELET # BLD AUTO: 201 K/UL (ref 150–400)
PMV BLD AUTO: 8.6 FL (ref 8.9–12.9)
POTASSIUM SERPL-SCNC: 4 MMOL/L (ref 3.5–5.1)
RBC # BLD AUTO: 2.85 M/UL (ref 3.8–5.2)
SERVICE CMNT-IMP: ABNORMAL
SODIUM SERPL-SCNC: 137 MMOL/L (ref 136–145)
WBC # BLD AUTO: 4.9 K/UL (ref 3.6–11)

## 2021-04-04 PROCEDURE — 74011250637 HC RX REV CODE- 250/637: Performed by: INTERNAL MEDICINE

## 2021-04-04 PROCEDURE — 36415 COLL VENOUS BLD VENIPUNCTURE: CPT

## 2021-04-04 PROCEDURE — XW033N5 INTRODUCTION OF MEROPENEM-VABORBACTAM ANTI-INFECTIVE INTO PERIPHERAL VEIN, PERCUTANEOUS APPROACH, NEW TECHNOLOGY GROUP 5: ICD-10-PCS | Performed by: INTERNAL MEDICINE

## 2021-04-04 PROCEDURE — 74011250637 HC RX REV CODE- 250/637: Performed by: FAMILY MEDICINE

## 2021-04-04 PROCEDURE — 99233 SBSQ HOSP IP/OBS HIGH 50: CPT | Performed by: INTERNAL MEDICINE

## 2021-04-04 PROCEDURE — 74011250636 HC RX REV CODE- 250/636: Performed by: FAMILY MEDICINE

## 2021-04-04 PROCEDURE — 77030038269 HC DRN EXT URIN PURWCK BARD -A

## 2021-04-04 PROCEDURE — 80048 BASIC METABOLIC PNL TOTAL CA: CPT

## 2021-04-04 PROCEDURE — 74011000258 HC RX REV CODE- 258: Performed by: FAMILY MEDICINE

## 2021-04-04 PROCEDURE — 85025 COMPLETE CBC W/AUTO DIFF WBC: CPT

## 2021-04-04 PROCEDURE — 74011250637 HC RX REV CODE- 250/637: Performed by: NURSE PRACTITIONER

## 2021-04-04 PROCEDURE — 65660000000 HC RM CCU STEPDOWN

## 2021-04-04 PROCEDURE — 74011250637 HC RX REV CODE- 250/637: Performed by: SPECIALIST

## 2021-04-04 PROCEDURE — 74011250636 HC RX REV CODE- 250/636: Performed by: INTERNAL MEDICINE

## 2021-04-04 RX ORDER — HYDRALAZINE HYDROCHLORIDE 50 MG/1
50 TABLET, FILM COATED ORAL 3 TIMES DAILY
Status: DISCONTINUED | OUTPATIENT
Start: 2021-04-04 | End: 2021-04-06

## 2021-04-04 RX ORDER — ZOLPIDEM TARTRATE 5 MG/1
5 TABLET ORAL
Status: DISCONTINUED | OUTPATIENT
Start: 2021-04-04 | End: 2021-04-09 | Stop reason: HOSPADM

## 2021-04-04 RX ORDER — SODIUM CHLORIDE 9 MG/ML
50 INJECTION, SOLUTION INTRAVENOUS CONTINUOUS
Status: DISCONTINUED | OUTPATIENT
Start: 2021-04-05 | End: 2021-04-06

## 2021-04-04 RX ADMIN — MEROPENEM 500 MG: 500 INJECTION, POWDER, FOR SOLUTION INTRAVENOUS at 20:32

## 2021-04-04 RX ADMIN — PANTOPRAZOLE SODIUM 40 MG: 40 TABLET, DELAYED RELEASE ORAL at 07:55

## 2021-04-04 RX ADMIN — ASPIRIN 81 MG: 81 TABLET, COATED ORAL at 21:23

## 2021-04-04 RX ADMIN — Medication 10 ML: at 21:24

## 2021-04-04 RX ADMIN — CARBIDOPA AND LEVODOPA 2 TABLET: 25; 100 TABLET ORAL at 21:23

## 2021-04-04 RX ADMIN — CARVEDILOL 3.12 MG: 3.12 TABLET, FILM COATED ORAL at 08:00

## 2021-04-04 RX ADMIN — BUMETANIDE 2 MG: 1 TABLET ORAL at 12:19

## 2021-04-04 RX ADMIN — HYDRALAZINE HYDROCHLORIDE 20 MG: 20 INJECTION INTRAMUSCULAR; INTRAVENOUS at 08:00

## 2021-04-04 RX ADMIN — CLOPIDOGREL BISULFATE 75 MG: 75 TABLET ORAL at 10:11

## 2021-04-04 RX ADMIN — DULOXETINE HYDROCHLORIDE 120 MG: 60 CAPSULE, DELAYED RELEASE ORAL at 10:11

## 2021-04-04 RX ADMIN — CARBIDOPA AND LEVODOPA 2 TABLET: 25; 100 TABLET ORAL at 17:34

## 2021-04-04 RX ADMIN — CARBIDOPA AND LEVODOPA 2 TABLET: 25; 100 TABLET ORAL at 10:11

## 2021-04-04 RX ADMIN — ACETAMINOPHEN 650 MG: 325 TABLET ORAL at 03:46

## 2021-04-04 RX ADMIN — HYDRALAZINE HYDROCHLORIDE 50 MG: 50 TABLET, FILM COATED ORAL at 17:34

## 2021-04-04 RX ADMIN — AMLODIPINE BESYLATE 5 MG: 5 TABLET ORAL at 12:19

## 2021-04-04 RX ADMIN — Medication 10 ML: at 07:55

## 2021-04-04 RX ADMIN — CARBIDOPA AND LEVODOPA 2 TABLET: 25; 100 TABLET ORAL at 13:58

## 2021-04-04 RX ADMIN — MEROPENEM 500 MG: 500 INJECTION, POWDER, FOR SOLUTION INTRAVENOUS at 12:21

## 2021-04-04 RX ADMIN — ATORVASTATIN CALCIUM 40 MG: 40 TABLET, FILM COATED ORAL at 21:23

## 2021-04-04 RX ADMIN — ZOLPIDEM TARTRATE 5 MG: 5 TABLET ORAL at 21:32

## 2021-04-04 RX ADMIN — Medication 10 ML: at 13:58

## 2021-04-04 NOTE — PROGRESS NOTES
Cardiology Progress Note            Admit Date: 3/29/2021  Admit Diagnosis: CHF exacerbation ([facility] [address] [I50.9]  Date: 4/4/2021     Time: 8:44 AM    Subjective:   Denies chest pain, SOB, orthopnea. Good urine output. Still complaining about that. Assessment and Plan     1.. Acute systolic CHF/Cardiomyopathy,   -EF 25-30%, mod-severe MR, TR, severe pulmonary HTN, mild-mod AI   -NYHA III    -Resume bumex 2 mg daily per renal   -Coreg 3.125 mg BID   -No Ace-I/ARB due to renal dysfunction.   -Continue Hydralazine   -Consider CRT in 3 months given low EF and LBBB   -No lifevest per Dr. Viki Rodriguez (more nonischemic cardiomyopathy than ischemic)      3. NSTEMI/CAD   -No further chest pain   -[facility] 4/21: Patent LIMA to LAD, VG to D1 to OM. S/p PCI/STEPHAN to LAD via LIMA 4/2/2021.   -% stenosed distal, prox 80%, mid 50%. Medical management.   -Continue ASA, Plavix, high intensity statin     4. PAF: recurrent   -No further PAF on tele review   -Now NSR- sinus bradycardia   -Suspect some element of SSS   -[id number] vasc= at least 8. Not on [address] PTA. Anemia of concern. 5. HTN :     - BP remains elevated   - On amlodipine, hydralazine, coreg    - Increase hydralazine to 50 Q8    6. MORENO on CKD    -Creatinine trending down 1.46 today. -Nephrology following- concern for Left BATOOL by MRA. -Left renal arteriogram on Monday. 7. Anemia, normocytic   -Hgb stable but low 8.1   -Anemia, chronic disease   -Stool OB negative    8. Possible LV thrombus apex   -noted on Echocardiogram read   -Repeat limited echo with definity to eval for possible apical LV thrombus. 9. NSVT- 4 beat run   -check mg. K=4.2   - no further occurrences    10. PHTN              - Severe by echo, PAS 85 mmHg     11. MR              - Moderate- severe by repeat echo, likely functional in nature may improve with cardiac resynchronization therapy.     12. Infra renal abdominal aortic aneurysm   -4 cm by US   -monitor. Other comorbids:  13. Carotid dz              - s/p CEA on left              - Plavix, Lipitor. 14. HLD:   - Lipitor   15. DM              - A1C 5.7       16. H/o CVA              - left lacunar infarct at head of caudate     Summary:   79 y.o. female with PMH of  CAD, now with new LV dysfunction, and NSTEMI who undewent PCI/STEPHAN to LAD on 21. Feeling better. . Plavix based DAPT planned. Medical management of RCA disease. Has bibasilar crackles on exam today. With exam findings,  noted dilated IVC on prior echo underlying function MR/TR with depressed EF, Will restart Bumex today since renal function improved. BP elevated today, will see if diuretics help otherwise may need to uptitrate meds. Renal angiogram Monday if still inpatient to evaluate possible Left renal artery stenosis. There was a concern on last echocardiogram regarding possible LV thrombus- repeat limited echo with definity for further evaluation. She will likely benefit from CRT in 3 months given LBBB and low EF. Will needs more than independent living assistance at home at discharge. She has impaired activity tolerance and deconditioning. Cardiac testin21   ECHO ADULT COMPLETE 2021 3/31/2021    Narrative · LV: Estimated LVEF is 25 - 30%. Normal wall thickness. Mildly dilated   left ventricle. Severely reduced systolic function. Abnormal left   ventricular septal motion consistent with left bundle branch block. Small   possible thrombus present in the left ventricle. Thrombus is located in   the apex. · MV: Severe mitral annular calcification. Moderate to severe mitral valve   regurgitation is present. · LA: Severely dilated left atrium. · TV: Moderate to severe tricuspid valve regurgitation is present. · PA: Severe pulmonary hypertension. Pulmonary arterial systolic pressure   is 85 mmHg. · RA: Mildly dilated right atrium.   · AV: Mild to moderate aortic valve regurgitation is present. Signed by: Liset Washington MD     03/29/21   CARDIAC PROCEDURE 04/01/2021 4/1/2021    Narrative Findings:  1)Severe antive 3 vessel CAD  2)Patetn LIMA to LAD, VG to d1 to OM  3. Occluded native RCA with rPDA collateralized from left system. All   arteries are heavily calcified  4. Native distal LAD with 90% stenosis  5. S/p PCI of native distal LAS with 2.25 by 18 mm Xinece STEPHAN through LIMA   graft  6. Normal LVEDP    Access  Left radial: Severe calcification of subclavian. Tortuous    Contrast 41 cc    Recommendations  1)Plavix based DPAT. 2. Interval renal angiogram in On Monday if she is still here--otherwise   as outpatient. Not done to day to conserve contrast.  3. GDMT for CAD  4. Medical mgm for RCA disease. Signed by: Mor Chua MD       [facility]  Past Medical History:   Diagnosis Date    Anxiety disorder     Atrial fibrillation ([address]     CAD (coronary artery disease) 2007    stents, CABG x 3v    Carotid stenosis     Cervical stenosis of spinal canal 07/2019    Chronic kidney disease     Cough     CVA (cerebral vascular accident) ([address] 07/2019    left lacunar infarct at head of caudate    Depression     AND CHRONIC ANXIETY    Diabetes ([address]     GERD (gastroesophageal reflux disease)     High cholesterol     History of peptic ulcer     Bleeding ulcer with increased NSAID use    Hypertension     Left carotid stenosis 07/2019    s/p left CEA with Dr. Carline Lopez, old 2007    PUD (peptic ulcer disease)     Stroke ([facility] [address]     Tremor     Valvular heart disease         Objective:   Physical Exam:                Visit Vitals  BP (!) 151/46   Pulse (!) 59   Temp 98 °F (36.7 °C)   Resp 16   Ht 5' 5\" (1.651 m)   Wt 66.9 kg (147 lb 7.8 oz)   SpO2 97%   BMI 24.54 kg/m²          General Appearance:   Well developed, pale, alert and oriented x 3, and   individual in no acute distress. Ears/Nose/Mouth/Throat:    Hearing grossly normal.         Neck:  Supple. Chest:    Lungs with bibasilar crackles,   Cardiovascular:  Regular rate and rhythm, S1, S2 normal, . Abdomen:    Soft, non-tender, bowel sounds are active. Extremities:  no edema bilaterally. Skin:  Warm and dry. Pale. Data Review:    Labs:    Recent Results (from the past 24 hour(s))   METABOLIC PANEL, BASIC    Collection Time: 04/04/21  3:41 AM   Result Value Ref Range    Sodium 137 136 - 145 mmol/L    Potassium 4.0 3.5 - 5.1 mmol/L    Chloride 109 (H) 97 - 108 mmol/L    CO2 23 21 - 32 mmol/L    Anion gap 5 5 - 15 mmol/L    Glucose 109 (H) 65 - 100 mg/dL    BUN 24 (H) 6 - 20 MG/DL    Creatinine 1.54 (H) 0.55 - 1.02 MG/DL    BUN/Creatinine ratio 16 12 - 20      GFR est AA 39 (L) >60 ml/min/1.73m2    GFR est non-AA 32 (L) >60 ml/min/1.73m2    Calcium 8.7 8.5 - 10.1 MG/DL   CBC WITH AUTOMATED DIFF    Collection Time: 04/04/21  3:41 AM   Result Value Ref Range    WBC 4.9 3.6 - 11.0 K/uL    RBC 2.85 (L) 3.80 - 5.20 M/uL    HGB 9.0 (L) 11.5 - 16.0 g/dL    HCT 27.9 (L) 35.0 - 47.0 %    MCV 97.9 80.0 - 99.0 FL    MCH 31.6 26.0 - 34.0 PG    MCHC 32.3 30.0 - 36.5 g/dL    RDW 15.9 (H) 11.5 - 14.5 %    PLATELET 459 855 - 487 K/uL    MPV 8.6 (L) 8.9 - 12.9 FL    NRBC 0.0 0  WBC    ABSOLUTE NRBC 0.00 0.00 - 0.01 K/uL    NEUTROPHILS 63 32 - 75 %    LYMPHOCYTES 18 12 - 49 %    MONOCYTES 12 5 - 13 %    EOSINOPHILS 6 0 - 7 %    BASOPHILS 1 0 - 1 %    IMMATURE GRANULOCYTES 0 0.0 - 0.5 %    ABS. NEUTROPHILS 3.1 1.8 - 8.0 K/UL    ABS. LYMPHOCYTES 0.9 0.8 - 3.5 K/UL    ABS. MONOCYTES 0.6 0.0 - 1.0 K/UL    ABS. EOSINOPHILS 0.3 0.0 - 0.4 K/UL    ABS. BASOPHILS 0.0 0.0 - 0.1 K/UL    ABS. IMM.  GRANS. 0.0 0.00 - 0.04 K/UL    DF AUTOMATED          Current Facility-Administered Medications   Medication Dose Route Frequency    zolpidem (AMBIEN) tablet 5 mg  5 mg Oral QHS PRN    [START ON 4/5/2021] 0.9% sodium chloride infusion  50 mL/hr IntraVENous CONTINUOUS    bumetanide (BUMEX) tablet 2 mg  2 mg Oral DAILY    carvediloL (COREG) tablet 3.125 mg  3.125 mg Oral BID WITH MEALS    cefTRIAXone (ROCEPHIN) 1 g in 0.9% sodium chloride (MBP/ADV) 50 mL MBP  1 g IntraVENous Q24H    sodium chloride (NS) flush 5-40 mL  5-40 mL IntraVENous PRN    amLODIPine (NORVASC) tablet 5 mg  5 mg Oral DAILY    sodium chloride (NS) flush 5-40 mL  5-40 mL IntraVENous Q8H    sodium chloride (NS) flush 5-40 mL  5-40 mL IntraVENous PRN    acetaminophen (TYLENOL) tablet 650 mg  650 mg Oral Q6H PRN    Or    acetaminophen (TYLENOL) suppository 650 mg  650 mg Rectal Q6H PRN    polyethylene glycol (MIRALAX) packet 17 g  17 g Oral DAILY PRN    promethazine (PHENERGAN) tablet 12.5 mg  12.5 mg Oral Q6H PRN    Or    ondansetron (ZOFRAN) injection 4 mg  4 mg IntraVENous Q6H PRN    aspirin delayed-release tablet 81 mg  81 mg Oral QHS    atorvastatin (LIPITOR) tablet 40 mg  40 mg Oral QHS    carbidopa-levodopa (SINEMET)  mg per tablet 2 Tab  2 Tab Oral QID    clopidogreL (PLAVIX) tablet 75 mg  75 mg Oral DAILY AFTER BREAKFAST    DULoxetine (CYMBALTA) capsule 120 mg  120 mg Oral DAILY    pantoprazole (PROTONIX) tablet 40 mg  40 mg Oral ACB    hydrALAZINE (APRESOLINE) tablet 37.5 mg  37.5 mg Oral TID    hydrALAZINE (APRESOLINE) 20 mg/mL injection 20 mg  20 mg IntraVENous Q6H PRN       Maria G Garland MD  4/4/2021, 10:05 AM    [facility] Office:   Ariana Loren Office:  [address] [place] [address] [facility] [address]     [address]  [address]  P: [phone]    P: [phone]  F: [phone]    F: [phone]

## 2021-04-04 NOTE — PROGRESS NOTES
[facility] Adult  Hospitalist Group                                                                                          Hospitalist Progress Note  Marjan Dhillon MD  Answering service: [phone] OR [phone] from in house phone              Progress Note    Patient: Hawk Barcenas MRN: [id number]  SSN: [id number]    YOB: 1940  Age: 79 y.o. Sex: female      Admit Date: 3/29/2021    LOS: 6 days     Subjective:     Patient presents with acute on chronic CHF as well as A. fib with RVR. Breathing is improving and currently rate is controlled. Patient has no acute complaint today. S/p cardiac cath with placement of stent to LAD. Overall doing well. Objective:     Vitals:    04/04/21 0747 04/04/21 0904 04/04/21 1149 04/04/21 1310   BP: (!) 180/56 (!) 151/46 (!) 172/58 (!) 166/60   Pulse: 64 (!) 59 62 68   Resp: 16  18    Temp: 98 °F (36.7 °C)  98.1 °F (36.7 °C)    SpO2: 97%  99%    Weight:       Height:            Intake and Output:  Current Shift: 04/04 0701 - 04/04 1900  In: -   Out: 300 [Urine:300]  Last three shifts: 04/02 1901 - 04/04 0700  In: 9318 [P.O.:1035]  Out: 600 [Urine:600]    Physical Exam:   GENERAL: alert, cooperative, no distress, appears stated age  THROAT & NECK: normal and no erythema or exudates noted. LUNG: clear to auscultation bilaterally  HEART: regular rate and rhythm, S1, S2 normal, no murmur, click, rub or gallop  ABDOMEN: soft, non-tender. Bowel sounds normal. No masses,  no organomegaly  EXTREMITIES:  extremities normal, atraumatic, no cyanosis or edema  SKIN: no rash or abnormalities  NEUROLOGIC: AOx3. PSYCHIATRIC: non focal    Lab/Data Review: All lab results for the last 24 hours reviewed.      Recent Results (from the past 24 hour(s))   METABOLIC PANEL, BASIC    Collection Time: 04/04/21  3:41 AM   Result Value Ref Range    Sodium 137 136 - 145 mmol/L    Potassium 4.0 3.5 - 5.1 mmol/L    Chloride 109 (H) 97 - 108 mmol/L    CO2 23 21 - 32 mmol/L    Anion gap 5 5 - 15 mmol/L    Glucose 109 (H) 65 - 100 mg/dL    BUN 24 (H) 6 - 20 MG/DL    Creatinine 1.54 (H) 0.55 - 1.02 MG/DL    BUN/Creatinine ratio 16 12 - 20      GFR est AA 39 (L) >60 ml/min/1.73m2    GFR est non-AA 32 (L) >60 ml/min/1.73m2    Calcium 8.7 8.5 - 10.1 MG/DL   CBC WITH AUTOMATED DIFF    Collection Time: 04/04/21  3:41 AM   Result Value Ref Range    WBC 4.9 3.6 - 11.0 K/uL    RBC 2.85 (L) 3.80 - 5.20 M/uL    HGB 9.0 (L) 11.5 - 16.0 g/dL    HCT 27.9 (L) 35.0 - 47.0 %    MCV 97.9 80.0 - 99.0 FL    MCH 31.6 26.0 - 34.0 PG    MCHC 32.3 30.0 - 36.5 g/dL    RDW 15.9 (H) 11.5 - 14.5 %    PLATELET 170 030 - 007 K/uL    MPV 8.6 (L) 8.9 - 12.9 FL    NRBC 0.0 0  WBC    ABSOLUTE NRBC 0.00 0.00 - 0.01 K/uL    NEUTROPHILS 63 32 - 75 %    LYMPHOCYTES 18 12 - 49 %    MONOCYTES 12 5 - 13 %    EOSINOPHILS 6 0 - 7 %    BASOPHILS 1 0 - 1 %    IMMATURE GRANULOCYTES 0 0.0 - 0.5 %    ABS. NEUTROPHILS 3.1 1.8 - 8.0 K/UL    ABS. LYMPHOCYTES 0.9 0.8 - 3.5 K/UL    ABS. MONOCYTES 0.6 0.0 - 1.0 K/UL    ABS. EOSINOPHILS 0.3 0.0 - 0.4 K/UL    ABS. BASOPHILS 0.0 0.0 - 0.1 K/UL    ABS. IMM.  GRANS. 0.0 0.00 - 0.04 K/UL    DF AUTOMATED          Imaging:           Assessment and Plan:     Congestive heart failure  -Acute systolic congestive heart failure NYHA class III, echo with EF of 25-30, new onset  -Cardiology following    Coronary artery disease  -It is post cardiac cath 4/1, s/p PCI with STEPHAN to LAD  -Continue dual antiplatelet  -Cardiology following    LV thrombus  -Noted on initial echocardiogram  -Repeat limited echo showing possible apical LV thrombus  -Further recommendation by cardiology    Diaphoresis  -Low probability for PE on V/Q  -Oxygenation improving and patient clinically improved    Atrial fibrillation with RVR  -Rate is now controlled, continue Coreg  -Not on anticoagulation at current    Valvular heart disease  -Moderate to severe mitral and tricuspid regurgitation on echo  -Plan per cardiology    MORENO  -MORENO on CKD stage III  -Improving renal function  -Nephrology following  -Continue diuretic    Renal artery stenosis  -Left renal artery high-grade stenosis and mild stenosis of the right renal artery on MRA of the abdomen  -Plan for renal angiogram on Monday    Aortic aneurysm  -Infrarenal aortic aneurysm of 3.9 cm, incidental finding on MRA of the abdomen  -Outpatient follow-up     Urinary tract infection  -Urine culture show ESBL Klebsiella  -Discontinue Rocephin, starting meropenem today    Hypertension  -Continue amlodipine, Coreg and hydralazine  -Blood pressure is stable    Parkinson disease   -Continue Sinemet    Anemia  -Anemia of chronic kidney disease  -H&H is stable    Insomnia  -Ambien as needed    Discharge disposition: Likely 24-48 hours pending progress. PT evaluation.     Signed By: Cyrus Truong MD     April 4, 2021

## 2021-04-04 NOTE — PROGRESS NOTES
1930: Bedside shift change report given to Barron Loja RN (oncoming nurse) by Lyric Castro RN (offgoing nurse). Report included the following information SBAR, Kardex, Intake/Output, MAR, Recent Results, Med Rec Status and Cardiac Rhythm  . 0700: TRANSFER - OUT REPORT:    Verbal report given to GOOD [facility] MEDICAL RN(name) on [employer]  being transferred to Cath Lab(unit) for ordered procedure       Report consisted of patients Situation, Background, Assessment and   Recommendations(SBAR). Information from the following report(s) SBAR, Kardex, ED Summary, OR Summary, Procedure Summary, Intake/Output, MAR, Accordion, Recent Results, Med Rec Status and Cardiac Rhythm NSR was reviewed with the receiving nurse. Lines:   Peripheral IV 04/02/21 Right Wrist (Active)   Site Assessment Clean, dry, & intact 04/04/21 2110   Phlebitis Assessment 0 04/04/21 2110   Infiltration Assessment 0 04/04/21 2110   Dressing Status Clean, dry, & intact 04/04/21 2110   Dressing Type Transparent;Tape 04/04/21 2110   Hub Color/Line Status Blue;Capped 04/04/21 2110   Action Taken Open ports on tubing capped 04/04/21 2110   Alcohol Cap Used Yes 04/04/21 2110        Opportunity for questions and clarification was provided. Patient transported with:  LAMONT Us Mow: Bedside shift change report given to Samuel Layton RN (oncoming nurse) by Barron Loja RN (offgoing nurse). Report included the following information SBAR, Kardex, Intake/Output, MAR, Recent Results, Med Rec Status and Cardiac Rhythm NSR.      0745: Tele disconnected, patient transported to Cath.

## 2021-04-05 LAB
ACT BLD: 158 SECS (ref 79–138)
ACT BLD: 186 SECS (ref 79–138)
ACT BLD: 191 SECS (ref 79–138)
ALBUMIN SERPL-MCNC: 2.9 G/DL (ref 3.5–5)
ALBUMIN SERPL-MCNC: 3.1 G/DL (ref 3.5–5)
ALBUMIN/GLOB SERPL: 1.1 {RATIO} (ref 1.1–2.2)
ALP SERPL-CCNC: 66 U/L (ref 45–117)
ALT SERPL-CCNC: <6 U/L (ref 12–78)
ANION GAP SERPL CALC-SCNC: 6 MMOL/L (ref 5–15)
ANION GAP SERPL CALC-SCNC: 6 MMOL/L (ref 5–15)
APTT PPP: 25.6 SEC (ref 22.1–31)
AST SERPL-CCNC: 11 U/L (ref 15–37)
BASOPHILS # BLD: 0 K/UL (ref 0–0.1)
BASOPHILS NFR BLD: 1 % (ref 0–1)
BILIRUB SERPL-MCNC: 0.6 MG/DL (ref 0.2–1)
BUN SERPL-MCNC: 22 MG/DL (ref 6–20)
BUN SERPL-MCNC: 28 MG/DL (ref 6–20)
BUN/CREAT SERPL: 15 (ref 12–20)
BUN/CREAT SERPL: 15 (ref 12–20)
CALCIUM SERPL-MCNC: 8.7 MG/DL (ref 8.5–10.1)
CALCIUM SERPL-MCNC: 8.9 MG/DL (ref 8.5–10.1)
CHLORIDE SERPL-SCNC: 108 MMOL/L (ref 97–108)
CHLORIDE SERPL-SCNC: 112 MMOL/L (ref 97–108)
CO2 SERPL-SCNC: 23 MMOL/L (ref 21–32)
CO2 SERPL-SCNC: 24 MMOL/L (ref 21–32)
COMMENT, HOLDF: NORMAL
CREAT SERPL-MCNC: 1.44 MG/DL (ref 0.55–1.02)
CREAT SERPL-MCNC: 1.84 MG/DL (ref 0.55–1.02)
DIFFERENTIAL METHOD BLD: ABNORMAL
EOSINOPHIL # BLD: 0.2 K/UL (ref 0–0.4)
EOSINOPHIL NFR BLD: 4 % (ref 0–7)
ERYTHROCYTE [DISTWIDTH] IN BLOOD BY AUTOMATED COUNT: 16.1 % (ref 11.5–14.5)
GLOBULIN SER CALC-MCNC: 2.6 G/DL (ref 2–4)
GLUCOSE SERPL-MCNC: 117 MG/DL (ref 65–100)
GLUCOSE SERPL-MCNC: 135 MG/DL (ref 65–100)
HCT VFR BLD AUTO: 26.4 % (ref 35–47)
HGB BLD-MCNC: 8.4 G/DL (ref 11.5–16)
IMM GRANULOCYTES # BLD AUTO: 0 K/UL (ref 0–0.04)
IMM GRANULOCYTES NFR BLD AUTO: 0 % (ref 0–0.5)
LYMPHOCYTES # BLD: 0.8 K/UL (ref 0.8–3.5)
LYMPHOCYTES NFR BLD: 16 % (ref 12–49)
MAGNESIUM SERPL-MCNC: 1.7 MG/DL (ref 1.6–2.4)
MCH RBC QN AUTO: 31.6 PG (ref 26–34)
MCHC RBC AUTO-ENTMCNC: 31.8 G/DL (ref 30–36.5)
MCV RBC AUTO: 99.2 FL (ref 80–99)
MONOCYTES # BLD: 0.4 K/UL (ref 0–1)
MONOCYTES NFR BLD: 9 % (ref 5–13)
NEUTS SEG # BLD: 3.5 K/UL (ref 1.8–8)
NEUTS SEG NFR BLD: 70 % (ref 32–75)
NRBC # BLD: 0 K/UL (ref 0–0.01)
NRBC BLD-RTO: 0 PER 100 WBC
PHOSPHATE SERPL-MCNC: 3.3 MG/DL (ref 2.6–4.7)
PLATELET # BLD AUTO: 195 K/UL (ref 150–400)
PMV BLD AUTO: 8.5 FL (ref 8.9–12.9)
POTASSIUM SERPL-SCNC: 3.6 MMOL/L (ref 3.5–5.1)
POTASSIUM SERPL-SCNC: 3.7 MMOL/L (ref 3.5–5.1)
PROT SERPL-MCNC: 5.5 G/DL (ref 6.4–8.2)
RBC # BLD AUTO: 2.66 M/UL (ref 3.8–5.2)
SAMPLES BEING HELD,HOLD: NORMAL
SODIUM SERPL-SCNC: 138 MMOL/L (ref 136–145)
SODIUM SERPL-SCNC: 141 MMOL/L (ref 136–145)
THERAPEUTIC RANGE,PTTT: NORMAL SECS (ref 58–77)
TROPONIN I SERPL-MCNC: 0.34 NG/ML
WBC # BLD AUTO: 5 K/UL (ref 3.6–11)

## 2021-04-05 PROCEDURE — C1753 CATH, INTRAVAS ULTRASOUND: HCPCS | Performed by: INTERNAL MEDICINE

## 2021-04-05 PROCEDURE — C1760 CLOSURE DEV, VASC: HCPCS | Performed by: INTERNAL MEDICINE

## 2021-04-05 PROCEDURE — 77030012468 HC VLV BLEEDBK CNTRL ABBT -B: Performed by: INTERNAL MEDICINE

## 2021-04-05 PROCEDURE — 77030013744: Performed by: INTERNAL MEDICINE

## 2021-04-05 PROCEDURE — 77030008543 HC TBNG MON PRSS MRTM -A: Performed by: INTERNAL MEDICINE

## 2021-04-05 PROCEDURE — 74011000250 HC RX REV CODE- 250: Performed by: INTERNAL MEDICINE

## 2021-04-05 PROCEDURE — 74011250637 HC RX REV CODE- 250/637: Performed by: INTERNAL MEDICINE

## 2021-04-05 PROCEDURE — 37252 INTRVASC US NONCORONARY 1ST: CPT | Performed by: INTERNAL MEDICINE

## 2021-04-05 PROCEDURE — 74011250637 HC RX REV CODE- 250/637: Performed by: NURSE PRACTITIONER

## 2021-04-05 PROCEDURE — B4181ZZ FLUOROSCOPY OF BILATERAL RENAL ARTERIES USING LOW OSMOLAR CONTRAST: ICD-10-PCS | Performed by: INTERNAL MEDICINE

## 2021-04-05 PROCEDURE — 74011000636 HC RX REV CODE- 636: Performed by: INTERNAL MEDICINE

## 2021-04-05 PROCEDURE — 85025 COMPLETE CBC W/AUTO DIFF WBC: CPT

## 2021-04-05 PROCEDURE — C1894 INTRO/SHEATH, NON-LASER: HCPCS | Performed by: INTERNAL MEDICINE

## 2021-04-05 PROCEDURE — 36415 COLL VENOUS BLD VENIPUNCTURE: CPT

## 2021-04-05 PROCEDURE — 99233 SBSQ HOSP IP/OBS HIGH 50: CPT | Performed by: INTERNAL MEDICINE

## 2021-04-05 PROCEDURE — 74011250636 HC RX REV CODE- 250/636: Performed by: NURSE PRACTITIONER

## 2021-04-05 PROCEDURE — 37253 INTRVASC US NONCORONARY ADDL: CPT | Performed by: INTERNAL MEDICINE

## 2021-04-05 PROCEDURE — 36252 INS CATH REN ART 1ST BILAT: CPT | Performed by: INTERNAL MEDICINE

## 2021-04-05 PROCEDURE — 80053 COMPREHEN METABOLIC PANEL: CPT

## 2021-04-05 PROCEDURE — C1887 CATHETER, GUIDING: HCPCS | Performed by: INTERNAL MEDICINE

## 2021-04-05 PROCEDURE — 83735 ASSAY OF MAGNESIUM: CPT

## 2021-04-05 PROCEDURE — 74011250636 HC RX REV CODE- 250/636: Performed by: INTERNAL MEDICINE

## 2021-04-05 PROCEDURE — 85347 COAGULATION TIME ACTIVATED: CPT

## 2021-04-05 PROCEDURE — C1769 GUIDE WIRE: HCPCS | Performed by: INTERNAL MEDICINE

## 2021-04-05 PROCEDURE — 99153 MOD SED SAME PHYS/QHP EA: CPT | Performed by: INTERNAL MEDICINE

## 2021-04-05 PROCEDURE — 80069 RENAL FUNCTION PANEL: CPT

## 2021-04-05 PROCEDURE — 99152 MOD SED SAME PHYS/QHP 5/>YRS: CPT | Performed by: INTERNAL MEDICINE

## 2021-04-05 PROCEDURE — 74011000258 HC RX REV CODE- 258: Performed by: FAMILY MEDICINE

## 2021-04-05 PROCEDURE — 65660000000 HC RM CCU STEPDOWN

## 2021-04-05 PROCEDURE — 84484 ASSAY OF TROPONIN QUANT: CPT

## 2021-04-05 PROCEDURE — 85730 THROMBOPLASTIN TIME PARTIAL: CPT

## 2021-04-05 PROCEDURE — 74011250636 HC RX REV CODE- 250/636: Performed by: FAMILY MEDICINE

## 2021-04-05 PROCEDURE — B448ZZ3 ULTRASONOGRAPHY OF BILATERAL RENAL ARTERIES, INTRAVASCULAR: ICD-10-PCS | Performed by: INTERNAL MEDICINE

## 2021-04-05 PROCEDURE — 77030004532 HC CATH ANGI DX IMP BSC -A: Performed by: INTERNAL MEDICINE

## 2021-04-05 RX ORDER — PROTAMINE SULFATE 10 MG/ML
INJECTION, SOLUTION INTRAVENOUS AS NEEDED
Status: DISCONTINUED | OUTPATIENT
Start: 2021-04-05 | End: 2021-04-05 | Stop reason: HOSPADM

## 2021-04-05 RX ORDER — FENTANYL CITRATE 50 UG/ML
INJECTION, SOLUTION INTRAMUSCULAR; INTRAVENOUS AS NEEDED
Status: DISCONTINUED | OUTPATIENT
Start: 2021-04-05 | End: 2021-04-05 | Stop reason: HOSPADM

## 2021-04-05 RX ORDER — MIDAZOLAM HYDROCHLORIDE 1 MG/ML
INJECTION, SOLUTION INTRAMUSCULAR; INTRAVENOUS AS NEEDED
Status: DISCONTINUED | OUTPATIENT
Start: 2021-04-05 | End: 2021-04-05 | Stop reason: HOSPADM

## 2021-04-05 RX ORDER — HEPARIN SODIUM 200 [USP'U]/100ML
INJECTION, SOLUTION INTRAVENOUS
Status: COMPLETED | OUTPATIENT
Start: 2021-04-05 | End: 2021-04-05

## 2021-04-05 RX ORDER — HEPARIN SODIUM 1000 [USP'U]/ML
INJECTION, SOLUTION INTRAVENOUS; SUBCUTANEOUS AS NEEDED
Status: DISCONTINUED | OUTPATIENT
Start: 2021-04-05 | End: 2021-04-05 | Stop reason: HOSPADM

## 2021-04-05 RX ORDER — ISOSORBIDE MONONITRATE 30 MG/1
30 TABLET, EXTENDED RELEASE ORAL DAILY
Status: DISCONTINUED | OUTPATIENT
Start: 2021-04-05 | End: 2021-04-09 | Stop reason: HOSPADM

## 2021-04-05 RX ORDER — CARVEDILOL 6.25 MG/1
6.25 TABLET ORAL 2 TIMES DAILY WITH MEALS
Status: DISCONTINUED | OUTPATIENT
Start: 2021-04-05 | End: 2021-04-06

## 2021-04-05 RX ORDER — LIDOCAINE HYDROCHLORIDE 10 MG/ML
INJECTION INFILTRATION; PERINEURAL AS NEEDED
Status: DISCONTINUED | OUTPATIENT
Start: 2021-04-05 | End: 2021-04-05 | Stop reason: HOSPADM

## 2021-04-05 RX ADMIN — CARBIDOPA AND LEVODOPA 2 TABLET: 25; 100 TABLET ORAL at 21:08

## 2021-04-05 RX ADMIN — SODIUM CHLORIDE 500 ML: 9 INJECTION, SOLUTION INTRAVENOUS at 19:26

## 2021-04-05 RX ADMIN — CARVEDILOL 3.12 MG: 3.12 TABLET, FILM COATED ORAL at 08:10

## 2021-04-05 RX ADMIN — DULOXETINE HYDROCHLORIDE 120 MG: 60 CAPSULE, DELAYED RELEASE ORAL at 12:08

## 2021-04-05 RX ADMIN — HYDRALAZINE HYDROCHLORIDE 50 MG: 50 TABLET, FILM COATED ORAL at 12:08

## 2021-04-05 RX ADMIN — MEROPENEM 500 MG: 500 INJECTION, POWDER, FOR SOLUTION INTRAVENOUS at 18:11

## 2021-04-05 RX ADMIN — PANTOPRAZOLE SODIUM 40 MG: 40 TABLET, DELAYED RELEASE ORAL at 12:09

## 2021-04-05 RX ADMIN — SODIUM CHLORIDE 50 ML/HR: 9 INJECTION, SOLUTION INTRAVENOUS at 11:30

## 2021-04-05 RX ADMIN — MEROPENEM 500 MG: 500 INJECTION, POWDER, FOR SOLUTION INTRAVENOUS at 03:40

## 2021-04-05 RX ADMIN — CARBIDOPA AND LEVODOPA 2 TABLET: 25; 100 TABLET ORAL at 18:11

## 2021-04-05 RX ADMIN — Medication 10 ML: at 21:10

## 2021-04-05 RX ADMIN — CLOPIDOGREL BISULFATE 75 MG: 75 TABLET ORAL at 12:09

## 2021-04-05 RX ADMIN — Medication 10 ML: at 13:36

## 2021-04-05 RX ADMIN — ASPIRIN 81 MG: 81 TABLET, COATED ORAL at 21:08

## 2021-04-05 RX ADMIN — ATORVASTATIN CALCIUM 40 MG: 40 TABLET, FILM COATED ORAL at 21:08

## 2021-04-05 RX ADMIN — ISOSORBIDE MONONITRATE 30 MG: 30 TABLET, EXTENDED RELEASE ORAL at 13:36

## 2021-04-05 RX ADMIN — CARBIDOPA AND LEVODOPA 2 TABLET: 25; 100 TABLET ORAL at 12:09

## 2021-04-05 RX ADMIN — Medication 10 ML: at 06:51

## 2021-04-05 RX ADMIN — MEROPENEM 500 MG: 500 INJECTION, POWDER, FOR SOLUTION INTRAVENOUS at 12:09

## 2021-04-05 NOTE — PROGRESS NOTES
Transitions of Care Plan:  RUR: 31%  Clinical Plan: cardiac cath; renal angio; possible d/c tomorrow  Consults: Cardiology; Nephrology: Therapy  Baseline: Resides at [facility] alone; open with [facility] services  Disposition: resumption of care for home health vs. SNF - need updated therapy recs    CM attempted to speak with patient today; however, patient lethargic from ambien overnight and cardiac cath today. Discussed progress with [facility] RN - patient was AOx4 overnight per night shift. Patient is currently open with [facility]. Therapy unable to see patient today due to cardiac cath procedure. CM will follow up with treatment team tomorrow re disposition. At this time, patient is set up with resumption of home health through [facility]. CM will continue to follow.       Adriel Brooks, MPH  Care Manager [facility]  Available via [address] [place] [address] or

## 2021-04-05 NOTE — PROGRESS NOTES
Spiritual Care Assessment/Progress Note  [facility] [place]      NAME: Sonya De La Garza      MRN: [id number]  AGE: 79 y.o.  SEX: female  Mormon Affiliation: Lutheran   Language: English     4/5/2021     Total Time (in minutes): 23     Spiritual Assessment begun in [facility] CV SERVICES UNIT through conversation with:         [x]Patient        [] Family    [] Friend(s)        Reason for Consult: Request by patient     Spiritual beliefs: (Please include comment if needed)     [x] Identifies with a avtar tradition:         [] Supported by a avtar community:            [] Claims no spiritual orientation:           [] Seeking spiritual identity:                [] Adheres to an individual form of spirituality:           [] Not able to assess:                           Identified resources for coping:      [] Prayer                               [] Music                  [] Guided Imagery     [x] Family/friends                 [] Pet visits     [] Devotional reading                         [] Unknown     [] Other:                                              Interventions offered during this visit: (See comments for more details)    Patient Interventions: Affirmation of emotions/emotional suffering, Affirmation of avtar, Coping skills reviewed/reinforced, Iconic (affirming the presence of God/Higher Power), Normalization of emotional/spiritual concerns           Plan of Care:     [] Support spiritual and/or cultural needs    [] Support AMD and/or advance care planning process      [] Support grieving process   [] Coordinate Rites and/or Rituals    [] Coordination with community clergy   [] No spiritual needs identified at this time   [] Detailed Plan of Care below (See Comments)  [] Make referral to Music Therapy  [] Make referral to Pet Therapy     [] Make referral to Addiction services  [] Make referral to [facility]  [] Make referral to Spiritual Care Partner  [] No future visits requested        [x] Follow up visits as needed     Visited pt at her request. Pt spoke long about her medical issues and concerns about insurance payments. She has concerns that her insurance company is not pleased with her being in the hospital again. Listened to her concerns.     Chaplain Herrera MDiv, MS, [facility]  [phone])

## 2021-04-05 NOTE — PROGRESS NOTES
TRANSFER - OUT REPORT:    Verbal report given to Evelyn MIGUEL(name) on [employer]  being transferred to [facility](unit) for routine progression of care       Report consisted of patients Situation, Background, Assessment and   Recommendations(SBAR). Information from the following report(s) Procedure Summary, Intake/Output, MAR, Accordion, Recent Results, Med Rec Status and Cardiac Rhythm sr-SB was reviewed with the receiving nurse. Lines:   Peripheral IV 04/02/21 Right Wrist (Active)   Site Assessment Clean, dry, & intact 04/04/21 2110   Phlebitis Assessment 0 04/04/21 2110   Infiltration Assessment 0 04/04/21 2110   Dressing Status Clean, dry, & intact 04/04/21 2110   Dressing Type Transparent;Tape 04/04/21 2110   Hub Color/Line Status Blue;Capped 04/04/21 2110   Action Taken Open ports on tubing capped 04/04/21 2110   Alcohol Cap Used Yes 04/04/21 2110        Opportunity for questions and clarification was provided.       Patient transported with:   Monitor  Registered Nurse   [phone] transferred via bed to room 463

## 2021-04-05 NOTE — PROGRESS NOTES
[facility]                                                                                          Hospitalist Progress Note  Jessica Carey MD  Answering service: [phone] OR [phone] from in house phone              Progress Note    Patient: Justina Glasgow MRN: [id number]  SSN: [id number]    YOB: 1940  Age: 79 y.o. Sex: female      Admit Date: 3/29/2021    LOS: 7 days     Subjective:     Patient presents with acute on chronic CHF as well as A. fib with RVR. Breathing is improving and currently rate is controlled. Patient has no acute complaint today. S/p cardiac cath with placement of stent to LAD. Overall doing well. Objective:     Vitals:    04/05/21 1100 04/05/21 1120 04/05/21 1130 04/05/21 1200   BP: (!) 153/54 (!) 163/53 (!) 165/53 (!) 169/52   Pulse: (!) 54 61 (!) 53 (!) 55   Resp: 17 19 15 17   Temp:   98.1 °F (36.7 °C)    SpO2: 96% 100% 99% 99%   Weight:       Height:            Intake and Output:  Current Shift: 04/05 0701 - 04/05 1900  In: 50 [I.V.:50]  Out: 200 [Urine:200]  Last three shifts: 04/03 1901 - 04/05 0700  In: 2260 [P.O.:2160; I.V.:100]  Out: 2150 [Urine:2150]    Physical Exam:   GENERAL: alert, cooperative, no distress, appears stated age  THROAT & NECK: normal and no erythema or exudates noted. LUNG: clear to auscultation bilaterally  HEART: regular rate and rhythm, S1, S2 normal, no murmur, click, rub or gallop  ABDOMEN: soft, non-tender. Bowel sounds normal. No masses,  no organomegaly  EXTREMITIES:  extremities normal, atraumatic, no cyanosis or edema  SKIN: no rash or abnormalities  NEUROLOGIC: AOx3. PSYCHIATRIC: non focal    Lab/Data Review: All lab results for the last 24 hours reviewed.      Recent Results (from the past 24 hour(s))   RENAL FUNCTION PANEL    Collection Time: 04/05/21  3:53 AM   Result Value Ref Range    Sodium 138 136 - 145 mmol/L    Potassium 3.7 3.5 - 5.1 mmol/L    Chloride 108 97 - 108 mmol/L    CO2 24 21 - 32 mmol/L    Anion gap 6 5 - 15 mmol/L    Glucose 117 (H) 65 - 100 mg/dL    BUN 28 (H) 6 - 20 MG/DL    Creatinine 1.84 (H) 0.55 - 1.02 MG/DL    BUN/Creatinine ratio 15 12 - 20      GFR est AA 32 (L) >60 ml/min/1.73m2    GFR est non-AA 26 (L) >60 ml/min/1.73m2    Calcium 8.9 8.5 - 10.1 MG/DL    Phosphorus 3.3 2.6 - 4.7 MG/DL    Albumin 3.1 (L) 3.5 - 5.0 g/dL   SAMPLES BEING HELD    Collection Time: 04/05/21  3:53 AM   Result Value Ref Range    SAMPLES BEING HELD 1lav     COMMENT        Add-on orders for these samples will be processed based on acceptable specimen integrity and analyte stability, which may vary by analyte.    POC ACTIVATED CLOTTING TIME    Collection Time: 04/05/21  9:07 AM   Result Value Ref Range    Activated Clotting Time (POC) 158 (H) 79 - 138 SECS   POC ACTIVATED CLOTTING TIME    Collection Time: 04/05/21  9:17 AM   Result Value Ref Range    Activated Clotting Time (POC) 186 (H) 79 - 138 SECS   POC ACTIVATED CLOTTING TIME    Collection Time: 04/05/21  9:34 AM   Result Value Ref Range    Activated Clotting Time (POC) 191 (H) 79 - 138 SECS        Imaging:           Assessment and Plan:     Congestive heart failure  -Acute systolic congestive heart failure NYHA class III, echo with EF of 25-30, new onset  -Cardiology following    Coronary artery disease  -It is post cardiac cath 4/1, s/p PCI with STEPHAN to LAD  -Continue dual antiplatelet  -Cardiology following    Possible LV thrombus  -Noted on initial echocardiogram  -Repeat limited echo reviewed by cardiology, ruled out for LV thrombus    Diaphoresis  -Low probability for PE on V/Q  -Oxygenation improving and patient clinically improved    Atrial fibrillation with RVR  -Rate is now controlled, continue Coreg  -Not on anticoagulation at current    Valvular heart disease  -Moderate to severe mitral and tricuspid regurgitation on echo  -Plan per cardiology    MORENO  -MORENO on CKD stage III  -Improving renal function  -Nephrology following  -Continue diuretic    Renal artery stenosis  -Left renal artery high-grade stenosis and mild stenosis of the right renal artery on MRA of the abdomen  -Renal angiogram, bilateral, today, does not show critical disease (50-70% stenosis on left and 40-50% stenosis on right)    Aortic aneurysm  -Infrarenal aortic aneurysm of 3.9 cm, incidental finding on MRA of the abdomen  -Outpatient follow-up     Urinary tract infection  -Urine culture show ESBL Klebsiella  -Continue meropenem    Hypertension  -Continue amlodipine, Coreg and hydralazine  -Blood pressure is stable    Parkinson disease   -Continue Sinemet    Anemia  -Anemia of chronic kidney disease  -H&H is stable    Insomnia  -Ambien as needed    Discharge disposition: Likely 24-48 hours pending progress. PT evaluation.     Signed By: Eduard Vaughn MD     April 5, 2021

## 2021-04-05 NOTE — PROGRESS NOTES
[facility]   [address] [address], [facility] [address], [facility]  Phone: [phone]   [phone]       Nephrology Progress Note  Mace Gowers     [birth year]     [id number]  Date of Admission : 3/29/2021  04/05/21    CC: Follow up for ARF    Assessment and Plan   MORENO on CKD   - Etiology : progressive renovascular disease   - Cr up today  - hold bumex post angio  - can resume in AM if Cr stable  - daily labs while here     Renal artery disease  - MRA confirmed severe / complete Left RA occlusion   - angio showing  50-70& dz L, 40-50% R    Infra renal AAA  - 3.9 cm   - follow up w/ vascular as out pt     CKD 3  - baseline Cr ~ 1.4-1.5 mg/dl   - renal US: progressive Left > R renal atrophy. Benign cysts   - presumed to be 2/2 DM, HTN      Mild acidosis :  - 2/2 CRF. Watch for now      Pulm edema/ Effusions/ Systolic HF  CAD s/p CABG 2015  - diuresis as above   - Echo EF 25%   - [facility] 4/1: patent grafts     Parkinson Dz      NCNC Anemia   - ? 2/2 CKD   - normal iron, B12, folate   - f/u Myeloma screen      PVD  S/P Left CEA        Interval History:  Seen and examined. Post renal angio. No interventions. Cr up to 1.8 today. Voiding well. Resting in bed, no resp distress. Review of Systems: A comprehensive review of systems was negative except for that written in the HPI.     Current Medications:   Current Facility-Administered Medications   Medication Dose Route Frequency    carvediloL (COREG) tablet 6.25 mg  6.25 mg Oral BID WITH MEALS    isosorbide mononitrate ER (IMDUR) tablet 30 mg  30 mg Oral DAILY    zolpidem (AMBIEN) tablet 5 mg  5 mg Oral QHS PRN    0.9% sodium chloride infusion  50 mL/hr IntraVENous CONTINUOUS    hydrALAZINE (APRESOLINE) tablet 50 mg  50 mg Oral TID    meropenem (MERREM) 500 mg in 0.9% sodium chloride (MBP/ADV) 50 mL MBP  0.5 g IntraVENous Q8H    [Held by provider] bumetanide (BUMEX) tablet 2 mg  2 mg Oral DAILY    sodium chloride (NS) flush 5-40 mL  5-40 mL IntraVENous PRN    sodium chloride (NS) flush 5-40 mL  5-40 mL IntraVENous Q8H    sodium chloride (NS) flush 5-40 mL  5-40 mL IntraVENous PRN    acetaminophen (TYLENOL) tablet 650 mg  650 mg Oral Q6H PRN    Or    acetaminophen (TYLENOL) suppository 650 mg  650 mg Rectal Q6H PRN    polyethylene glycol (MIRALAX) packet 17 g  17 g Oral DAILY PRN    promethazine (PHENERGAN) tablet 12.5 mg  12.5 mg Oral Q6H PRN    Or    ondansetron (ZOFRAN) injection 4 mg  4 mg IntraVENous Q6H PRN    aspirin delayed-release tablet 81 mg  81 mg Oral QHS    atorvastatin (LIPITOR) tablet 40 mg  40 mg Oral QHS    carbidopa-levodopa (SINEMET)  mg per tablet 2 Tab  2 Tab Oral QID    clopidogreL (PLAVIX) tablet 75 mg  75 mg Oral DAILY AFTER BREAKFAST    DULoxetine (CYMBALTA) capsule 120 mg  120 mg Oral DAILY    pantoprazole (PROTONIX) tablet 40 mg  40 mg Oral ACB    hydrALAZINE (APRESOLINE) 20 mg/mL injection 20 mg  20 mg IntraVENous Q6H PRN      No Known Allergies    Objective:  Vitals:    Vitals:    04/05/21 1045 04/05/21 1100 04/05/21 1120 04/05/21 1130   BP: (!) 138/57 (!) 153/54 (!) 163/53 (!) 165/53   Pulse: (!) 54 (!) 54 61 (!) 53   Resp: 16 17 19 15   Temp:    98.1 °F (36.7 °C)   SpO2: 92% 96% 100% 99%   Weight:       Height:         Intake and Output:  04/05 0701 - 04/05 1900  In: 50 [I.V.:50]  Out: 200 [Urine:200]  04/03 1901 - 04/05 0700  In: 2260 [P.O.:2160; I.V.:100]  Out: 2150 [Urine:2150]    Physical Examination:  General:  NAD, resting in bed  HEENT: PERRL,  ++ Pallor , No Icterus  Neck: Supple,no mass palpable  Lungs :CTA b/l  CVS: RRR, S1 S2 normal, No murmur   Abdomen: Soft, NT, BS +  Extremities: no LE edema  Skin: discoloration in both legs .   MS: No joint swelling, erythema, warmth  Neurologic: non focal, AAO x 3    []    High complexity decision making was performed  []    Patient is at high-risk of decompensation with multiple organ involvement    Lab Data Personally Reviewed: I have reviewed all the pertinent labs, microbiology data and radiology studies during assessment. Recent Labs     04/05/21  0353 04/04/21  0341 04/03/21  0505    137 138   K 3.7 4.0 4.2    109* 109*   CO2 24 23 20*   * 109* 113*   BUN 28* 24* 25*   CREA 1.84* 1.54* 1.46*   CA 8.9 8.7 8.0*   MG  --   --  1.9   PHOS 3.3  --   --    ALB 3.1*  --   --      Recent Labs     04/04/21  0341 04/03/21  0505   WBC 4.9 6.0   HGB 9.0* 8.1*   HCT 27.9* 26.0*    184     No results found for: SDES  Lab Results   Component Value Date/Time    Culture result: (A) 04/02/2021 04:05 AM     KLEBSIELLA PNEUMONIAE ** (EXTENDED SPECTRUM BETA LACTAMASE ) **    Culture result: MRSA NOT PRESENT 12/26/2018 09:29 PM    Culture result:  12/26/2018 09:29 PM         Screening of patient nares for MRSA is for surveillance purposes and, if positive, to facilitate isolation considerations in high risk settings. It is not intended for automatic decolonization interventions per se as regimens are not sufficiently effective to warrant routine use. Culture result: MRSA PRESENT 02/09/2015 06:30 PM    Culture result:  02/09/2015 06:30 PM         Screening of patient nares for MRSA is for surveillance purposes and, if positive, to facilitate isolation considerations in high risk settings. It is not intended for automatic decolonization interventions per se as regimens are not sufficiently effective to warrant routine use.      Recent Results (from the past 24 hour(s))   RENAL FUNCTION PANEL    Collection Time: 04/05/21  3:53 AM   Result Value Ref Range    Sodium 138 136 - 145 mmol/L    Potassium 3.7 3.5 - 5.1 mmol/L    Chloride 108 97 - 108 mmol/L    CO2 24 21 - 32 mmol/L    Anion gap 6 5 - 15 mmol/L    Glucose 117 (H) 65 - 100 mg/dL    BUN 28 (H) 6 - 20 MG/DL    Creatinine 1.84 (H) 0.55 - 1.02 MG/DL    BUN/Creatinine ratio 15 12 - 20      GFR est AA 32 (L) >60 ml/min/1.73m2    GFR est non-AA 26 (L) >60 ml/min/1.73m2    Calcium 8.9 8.5 - 10.1 MG/DL    Phosphorus 3.3 2.6 - 4.7 MG/DL    Albumin 3.1 (L) 3.5 - 5.0 g/dL   SAMPLES BEING HELD    Collection Time: 04/05/21  3:53 AM   Result Value Ref Range    SAMPLES BEING HELD 1lav     COMMENT        Add-on orders for these samples will be processed based on acceptable specimen integrity and analyte stability, which may vary by analyte. POC ACTIVATED CLOTTING TIME    Collection Time: 04/05/21  9:07 AM   Result Value Ref Range    Activated Clotting Time (POC) 158 (H) 79 - 138 SECS   POC ACTIVATED CLOTTING TIME    Collection Time: 04/05/21  9:17 AM   Result Value Ref Range    Activated Clotting Time (POC) 186 (H) 79 - 138 SECS   POC ACTIVATED CLOTTING TIME    Collection Time: 04/05/21  9:34 AM   Result Value Ref Range    Activated Clotting Time (POC) 191 (H) 79 - 138 SECS           Total time spent with patient:  xxx   min. Care Plan discussed with:  Patient     Family      RN      Consulting Physician [facility],         I have reviewed the flowsheets. Chart and Pertinent Notes have been reviewed. No change in PMH ,family and social history from Consult note.       Latrell Dixon MD no

## 2021-04-05 NOTE — PROGRESS NOTES
TRANSFER - IN REPORT:    Verbal report received from Susana MIGUEL on [employer]  being received from procedure for routine progression of care. Report consisted of patients Situation, Background, Assessment and Recommendations(SBAR). Information from the following report(s) Procedure Summary, MAR and Recent Results was reviewed with the receiving clinician. Opportunity for questions and clarification was provided. Assessment completed upon patients arrival to [address] [place] [address] and care assumed. Cardiac Cath Lab Recovery Arrival Note:    [employer] arrived to [facility] recovery area. Patient procedure= renal angiogram. Patient on cardiac monitor, non-invasive blood pressure, SPO2 monitor. On  O2 @ 2 lpm via n/c. IV  of nacl on pump at 50 ml/hr. Patient status doing well without problems. groin marked from post mynx hand held/ susana RN, Dr Pita Barrientos aware, area soft on arrival to recovery  Patient is A&Ox 4. Patient reports no complaints. PROCEDURE SITE CHECK:    Procedure site:without any bleeding and or hematoma, no pain/discomfort reported at procedure site. No change in patient status. Continue to monitor patient and status.     Dr Pita Barrientos call family via phone

## 2021-04-05 NOTE — PROGRESS NOTES
Physical Therapy    Chart reviewed. Noted patient ANALISA in CCL-PL at this time (plan for L renal arteriogram today). Will follow up for PT intervention as able. Followed up with pt. Pt has recently returned from cath lab. Pt is lethargic from procedure. Will defer and follow up tomorrow.

## 2021-04-05 NOTE — PROGRESS NOTES
Chart reviewed. Noted patient ANALISA in CCL-PL at this time (plan for L renal arteriogram today). Will follow up for OT intervention as able. Thank you.

## 2021-04-05 NOTE — PROGRESS NOTES
0730[de-identified] Bedside shift change report given to Abbey Quiroz RN (oncoming nurse) by Roxana Elias (offgoing nurse). Report included the following information SBAR, Kardex, Intake/Output, MAR, Accordion, Recent Results and Cardiac Rhythm NSR/SB. 1930: Paged cardiology regarding BP of 11/33 with MAP of 59. Spoke with Randall Mendoza MD. Per MD, hold rest of BP meds tonight and revaluate in AM.     Bedside shift change report given to [address] (oncoming nurse) by Abbey Quiroz RN (offgoing nurse). Report included the following information SBAR, Kardex, Intake/Output, MAR, Accordion, Recent Results and Cardiac Rhythm NSR/SB.

## 2021-04-05 NOTE — PROGRESS NOTES
Cardiology Progress Note            Admit Date: 3/29/2021  Admit Diagnosis: CHF exacerbation ([address] [I50.9]  Date: 4/5/2021     Time: 8:44 AM    Subjective:   No further chest pain. Denies SOB. States she feels better today. Underwent C with PCI/STEPHAN to LAD through LIMA graft yesterday. Assessment and Plan     1.. Acute systolic CHF/Cardiomyopathy, possibly ischemic: Echo from April 3rd personally reviewed by me. EF 50-55% when conduction normal and 35-40% with LBBB. -Continue bumex 2 mg daily per renal   -Coreg to be increased to 6.25 BID. Will d/c Amlodipine and add Imdur   -No Ace-I/ARB due to renal dysfunction.   -Hydralazine nitrate therapy as an alternative would be continued. -May consider CRT in 3 months   -NO LV THROMBUS    POTENTIAL DISCHARGE TOMORROW ID RENAL FUNCTION STABLE. 3. NSTEMI/CAD   -No further chest pain   -C 4/21: Patent LIMA to LAD, VG to D1 to OM. S/p PCI/STEPHAN to LAD via LIMA. -% stenosed distal, prox 80%, mid 50%. Medical management.   -Continue ASA, Plavix, high intensity statin     4. PAF: recurrent   -No further PAF on tele review   -Now NSR   -Some bradycardia with low dose coreg- stopped 4/1/21  -Suspect some element of SSS   -[id number] vasc= at least 8.not on [address] PTA. Anemia of concern. 5. HTN :    -controlled   -On amlodipine, hydralazine, coreg     6. MORENO on CKD    -Creatinine trending down 1.61 today. -Nephrology following- concern for Left BATOOL by MRA. - Bilateral renal angiogram today does not show critical disease in either renal arteries, left renal artery 50-70% and right renal artery close to 40-50% stenosis. No intervention needed. 25 cc contrast used    7. Anemia, normocytic   -Repeat cbc pending   -Check stool OB    8. CAD              - s/p CABG x 3 2015,  Stent 2007              - Coreg, Lipitor,, ASA- stop Plavix.    -Nuclear stress 5/5/2020: No ischemia, possile small distal anterior infarct EF 53%    9. MR              - Moderate- severe by repeat echo, likely functional in nature may improve with cardiac resynchronization therapy. 10. PHTN              - Severe by echo, PAS 85 mmHg     11.nfra renal abdominal aortic aneurysm   -4 cm by US   -monitor. Other comorbids:  13. Carotid dz              - s/p CEA on left              - Plavix, Lipitor. 14. HLD:   - Lipitor   15. DM              - A1C 5.7       16. H/o CVA              - left lacunar infarct at head of caudate     Summary:   79 y.o. female with PMH of CAD, now with new LV dysfunction, and NSTEMI who undewent PCI/STEPHAN to LAD yesterday. She is feeling better today. Plavix based DAPT planned. Medical management of RCA disease. Renal function improving. Plan to possibly proceed with left Renal artery arteriogram Monday. There was a concern on echocardiogram regarding possible LV thrombus. We will perform definitive study with with echocardiogram.  She will require more assistance than she currently has at home. May require anesthesia care during her discharge. If she is here through the weekend, will perform renal angiogram on Monday. She will definitively require diuretic therapy given dilated IVC on her echocardiogram and persistent clinical evidence of congestion. Particularly given underlying functional mitral and tricuspid regurgitation, lack of diuretics on board will put her at high risk of repeat decompensation  Sarahi Yoon MD 2021 1000 AM      Cardiac testin21   ECHO ADULT COMPLETE 2021 3/31/2021    Narrative · LV: Estimated LVEF is 25 - 30%. Normal wall thickness. Mildly dilated   left ventricle. Severely reduced systolic function. Abnormal left   ventricular septal motion consistent with left bundle branch block. Small   possible thrombus present in the left ventricle. Thrombus is located in   the apex. · MV: Severe mitral annular calcification.  Moderate to severe mitral valve   regurgitation is present. · LA: Severely dilated left atrium. · TV: Moderate to severe tricuspid valve regurgitation is present. · PA: Severe pulmonary hypertension. Pulmonary arterial systolic pressure   is 85 mmHg. · RA: Mildly dilated right atrium. · AV: Mild to moderate aortic valve regurgitation is present. Signed by: Enedelia Gabriel MD     03/29/21   CARDIAC PROCEDURE 04/01/2021 4/1/2021    Narrative Findings:  1)Severe antive 3 vessel CAD  2)Patetn LIMA to LAD, VG to d1 to OM  3. Occluded native RCA with rPDA collateralized from left system. All   arteries are heavily calcified  4. Native distal LAD with 90% stenosis  5. S/p PCI of native distal LAS with 2.25 by 18 mm Xinece STEPHAN through LIMA   graft  6. Normal LVEDP    Access  Left radial: Severe calcification of subclavian. Tortuous    Contrast 41 cc    Recommendations  1)Plavix based DPAT. 2. Interval renal angiogram in On Monday if she is still here--otherwise   as outpatient. Not done to day to conserve contrast.  3. GDMT for CAD  4. Medical mgm for RCA disease.      Signed by: Maudie Goodpasture, MD                  [facility]  Past Medical History:   Diagnosis Date    Anxiety disorder     Atrial fibrillation ([address]     CAD (coronary artery disease) 2007    stents, CABG x 3v    Carotid stenosis     Cervical stenosis of spinal canal 07/2019    Chronic kidney disease     Cough     CVA (cerebral vascular accident) ([address] 07/2019    left lacunar infarct at head of caudate    Depression     AND CHRONIC ANXIETY    Diabetes ([address]     GERD (gastroesophageal reflux disease)     High cholesterol     History of peptic ulcer     Bleeding ulcer with increased NSAID use    Hypertension     Left carotid stenosis 07/2019    s/p left CEA with Dr. Sheryle Sandy, old 2007    PUD (peptic ulcer disease)     Stroke ([address]     Tremor     Valvular heart disease       Social Hx  Social History     Socioeconomic History    Marital status: SINGLE     Spouse name: Not on file    Number of children: Not on file    Years of education: Not on file    Highest education level: Not on file   Occupational History    Occupation: Retired realestate/teacher   Social Needs    Financial resource strain: Not on file    Food insecurity     Worry: Not on file     Inability: Not on file   [employer] needs     Medical: Not on file     Non-medical: Not on file   Tobacco Use    Smoking status: Former Smoker     Packs/day: 0.25     Years: 5.00     Pack years: 1.25     Types: Cigarettes     Quit date:      Years since quittin.2    Smokeless tobacco: Never Used   Substance and Sexual Activity    Alcohol use: Yes     Alcohol/week: 0.0 standard drinks     Comment: Rare    Drug use: No    Sexual activity: Not on file   Lifestyle    Physical activity     Days per week: Not on file     Minutes per session: Not on file    Stress: Not on file   Relationships    Social connections     Talks on phone: Not on file     Gets together: Not on file     Attends Buddhist service: Not on file     Active member of club or organization: Not on file     Attends meetings of clubs or organizations: Not on file     Relationship status: Not on file    Intimate partner violence     Fear of current or ex partner: Not on file     Emotionally abused: Not on file     Physically abused: Not on file     Forced sexual activity: Not on file   Other Topics Concern    Not on file   Social History Narrative    Lives in [address]       Objective:   Physical Exam:                Visit Vitals  BP (!) 173/73 (BP 1 Location: Right arm, BP Patient Position: At rest;Supine)   Pulse (!) 59   Temp 98.9 °F (37.2 °C)   Resp 18   Ht 5' 5\" (1.651 m)   Wt 148 lb 9.4 oz (67.4 kg)   SpO2 98%   BMI 24.73 kg/m²          General Appearance:   Well developed, pale, alert and oriented x 3, and   individual in no acute distress.    Ears/Nose/Mouth/Throat:    Hearing grossly normal.         Neck: Supple. Chest:    Lungs with left basilar crackles,   Cardiovascular:  Regular rate and rhythm, S1, S2 normal, no murmur. Abdomen:    Soft, non-tender, bowel sounds are active. Extremities:  no edema bilaterally. Skin:  Warm and dry. Pale. Telemetry: NSR with BBB currently. No PAF            Data Review:    Labs:    Recent Results (from the past 24 hour(s))   RENAL FUNCTION PANEL    Collection Time: 04/05/21  3:53 AM   Result Value Ref Range    Sodium 138 136 - 145 mmol/L    Potassium 3.7 3.5 - 5.1 mmol/L    Chloride 108 97 - 108 mmol/L    CO2 24 21 - 32 mmol/L    Anion gap 6 5 - 15 mmol/L    Glucose 117 (H) 65 - 100 mg/dL    BUN 28 (H) 6 - 20 MG/DL    Creatinine 1.84 (H) 0.55 - 1.02 MG/DL    BUN/Creatinine ratio 15 12 - 20      GFR est AA 32 (L) >60 ml/min/1.73m2    GFR est non-AA 26 (L) >60 ml/min/1.73m2    Calcium 8.9 8.5 - 10.1 MG/DL    Phosphorus 3.3 2.6 - 4.7 MG/DL    Albumin 3.1 (L) 3.5 - 5.0 g/dL   SAMPLES BEING HELD    Collection Time: 04/05/21  3:53 AM   Result Value Ref Range    SAMPLES BEING HELD 1lav     COMMENT        Add-on orders for these samples will be processed based on acceptable specimen integrity and analyte stability, which may vary by analyte.           Radiology:        Current Facility-Administered Medications   Medication Dose Route Frequency    zolpidem (AMBIEN) tablet 5 mg  5 mg Oral QHS PRN    0.9% sodium chloride infusion  50 mL/hr IntraVENous CONTINUOUS    hydrALAZINE (APRESOLINE) tablet 50 mg  50 mg Oral TID    meropenem (MERREM) 500 mg in 0.9% sodium chloride (MBP/ADV) 50 mL MBP  0.5 g IntraVENous Q8H    bumetanide (BUMEX) tablet 2 mg  2 mg Oral DAILY    carvediloL (COREG) tablet 3.125 mg  3.125 mg Oral BID WITH MEALS    sodium chloride (NS) flush 5-40 mL  5-40 mL IntraVENous PRN    amLODIPine (NORVASC) tablet 5 mg  5 mg Oral DAILY    sodium chloride (NS) flush 5-40 mL  5-40 mL IntraVENous Q8H    sodium chloride (NS) flush 5-40 mL  5-40 mL IntraVENous PRN    acetaminophen (TYLENOL) tablet 650 mg  650 mg Oral Q6H PRN    Or    acetaminophen (TYLENOL) suppository 650 mg  650 mg Rectal Q6H PRN    polyethylene glycol (MIRALAX) packet 17 g  17 g Oral DAILY PRN    promethazine (PHENERGAN) tablet 12.5 mg  12.5 mg Oral Q6H PRN    Or    ondansetron (ZOFRAN) injection 4 mg  4 mg IntraVENous Q6H PRN    aspirin delayed-release tablet 81 mg  81 mg Oral QHS    atorvastatin (LIPITOR) tablet 40 mg  40 mg Oral QHS    carbidopa-levodopa (SINEMET)  mg per tablet 2 Tab  2 Tab Oral QID    clopidogreL (PLAVIX) tablet 75 mg  75 mg Oral DAILY AFTER BREAKFAST    DULoxetine (CYMBALTA) capsule 120 mg  120 mg Oral DAILY    pantoprazole (PROTONIX) tablet 40 mg  40 mg Oral ACB    hydrALAZINE (APRESOLINE) 20 mg/mL injection 20 mg  20 mg IntraVENous Q6H PRN          Monika Sarmiento MD     I concur with the above note, findings and assessment. She seems to be feeling slightly better in terms of her breathing and does not report any further chest discomfort. BP (!) 173/73 (BP 1 Location: Right arm, BP Patient Position: At rest;Supine)   Pulse (!) 59   Temp 98.9 °F (37.2 °C)   Resp 18   Ht 5' 5\" (1.651 m)   Wt 148 lb 9.4 oz (67.4 kg)   SpO2 98%   BMI 24.73 kg/m²   General:    Alert, cooperative, no distress. Psychiatric:    Normal Mood and affect    Eye/ENT:      Pupils equal, No asymmetry, Conjunctival pink. Able to hear voice at normal amplitude   Lungs:      Visibly symmetric chest expansion, No palpable tenderness. Basal crackls    Heart[de-identified]    Regular rate and rhythm, S1, S2 normal, no murmur, click, rub or gallop. No JVD, Normal palpable peripheral pulses. No cyanosis   Abdomen:     Soft, non-tender. Bowel sounds normal. No masses,  No      organomegaly. Extremities:   Extremities normal, atraumatic, no edema. Neurologic:   CN II-XII grossly intact.  No gross focal deficits       Patient was seen with NP/PA, recent investigations were reviewed and treatment care/plan was formulated together. There was a concern on echocardiogram regarding possible LV thrombus. We will perform definitive study with with echocardiogram.  She will require more assistance than she currently has at home. May require anesthesia care during her discharge. If she is here through the weekend, will perform renal angiogram on Monday. She will definitively require diuretic therapy given dilated IVC on her echocardiogram and persistent clinical evidence of congestion. Particularly given underlying functional mitral and tricuspid regurgitation, lack of diuretics on board will put her at high risk of repeat. Reviewed her echocardiogram demonstrates significant dyssynchrony of the septum due to underlying left bundle branch block.   She may benefit from cardiac resynchronization therapy long-term but given recent PCI will have to wait for 3 months before she can receive CRT  Chantelle iLmon MD   4/5/2021  11:43 AM             [facility] [address], [address] [place] [address] [address] [place] [address]   [phone]

## 2021-04-05 NOTE — PROGRESS NOTES
Cardiac Cath Lab Procedure Area Arrival Note:    Arlette Rao arrived to Cardiac Cath Lab, Procedure Area. Patient identifiers verified with NAME and DATE OF BIRTH. Procedure verified with patient. Consent forms verified. Allergies verified. Patient informed of procedure and plan of care. Questions answered with review. Patient voiced understanding of procedure and plan of care. Patient on cardiac monitor, non-invasive blood pressure, SPO2 monitor. On RA and placed on O2 @ 2 lpm via NC.  IV of NS on pump at 25 ml/hr. Patient status doing well without problems. Patient is A&Ox 4. Patient reports no chest pain or shortness of breath. Patient medicated during procedure with orders obtained and verified by Dr. Jewell Hartley. Refer to patients Cardiac Cath Lab PROCEDURE REPORT for vital signs, assessment, status, and response during procedure, printed at end of case. Printed report on chart or scanned into chart. 9:57 Transfer to [address] [place] [address] from Procedure Area    Verbal report given to Dilan Ramos RN on Arlette Rao being transferred to Cardiac Cath Lab  for routine progression of care   Patient is post RENAL ANGIOGRAPHY/IVUS procedure. Patient stable upon transfer to . Report consisted of patients Situation, Background, Assessment and   Recommendations(SBAR). Information from the following report(s) SBAR and Procedure Summary was reviewed with the receiving nurse. Opportunity for questions and clarification was provided. Patient medicated during procedure with orders obtained and verified by Dr. Marcos Layne. Refer to patient PROCEDURE REPORT for vital signs, assessment, status, and response during procedure.

## 2021-04-05 NOTE — PROGRESS NOTES
TRANSFER - IN REPORT:    Verbal report received from Radha RN(name) on [employer]  being received from [facility](unit) for routine progression of care      Report consisted of patients Situation, Background, Assessment and   Recommendations(SBAR). Information from the following report(s) SBAR, Kardex, Intake/Output, Accordion and Cardiac Rhythm SR-SB was reviewed with the receiving nurse. Opportunity for questions and clarification was provided. Assessment completed upon patients arrival to unit and care assumed. 745   Cardiac Cath Lab Recovery Arrival Note:      [employer] arrived to Cardiac Cath Lab, Recovery Area. Staff introduced to patient. Patient identifiers verified with NAME and DATE OF BIRTH. Procedure verified with patient. Consent forms reviewed and signed by patient or authorized representative and verified. Allergies verified. Patient and family oriented to department. Patient and family informed of procedure and plan of care. Questions answered with review. Patient prepped for procedure, per orders from physician, prior to arrival.    Patient on cardiac monitor, non-invasive blood pressure, SPO2 monitor. On room air. Patient is A&Ox 4. Patient reports no complaints. Patient in stretcher, in low position, with side rails up, call bell within reach, patient instructed to call if assistance as needed. Patient prep in: [address], [place] [address].    Patient family has pager # 0  Family in: outside hospital.   Prep by: Kavin Murillo RN    Pre procedure teaching completed

## 2021-04-05 NOTE — PROCEDURES
Indication for the procedure: Suspected renal artery stenosis given uncontrolled blood pressure and unequal kidneys with increasing renal dysfunction    Procedural details: Patient was brought to the Cath Lab after providing informed consent. Right femoral access was achieved with ultrasound guidance. A 25 cm 6 [place] sheath was advanced with some difficulty across the right iliofemoral system into the distal abdominal aorta. A LIMA diagnostic catheter was used to perform diagnostic imaging. After selective engagement of the right renal artery, angiography was performed. Catheter was removed from right renal artery and left renal artery were selectively engaged followed by left renal angiogram.  After evaluating the angiogram it was decided to perform intravascular ultrasound on both renal artery ostia. An IVUS catheter was then subsequently advanced within a 6 [place] MONTY guide catheter across the stenotic ostium of left renal artery followed by pullback perform IVUS imaging. Guidewire subsequently directed towards right renal artery with selective engagement followed by wiring and IVUS imaging followed by pullback. Results: After angiography 60 to 70% stenosis of ostium of right renal artery was noted. On IVUS imaging the minimal luminal dimensions were 4 x 2.1 mm with MLA of 8 mm². Left renal angiogram demonstrated 50 to 60% angiographic stenosis which on IVUS appeared to be even less prominent with minimal luminal dimensions of 4.2 x 3.7 mm. Conclusion: Moderate to severe left renal artery stenosis and mild to moderate right renal arterial stenosis. Both stenotic lesions are ostial and atherosclerotic in etiology.

## 2021-04-05 NOTE — PROGRESS NOTES
0730 Bedside and Verbal shift change report given to Prince Alysia RN (oncoming nurse) by Desirae Dumont RN (offgoing nurse). Report included the following information SBAR, Kardex, ED Summary, Intake/Output, MAR, Recent Results, Med Rec Status and Cardiac Rhythm NSR/ SB.       1100 TRANSFER - IN REPORT:    Verbal report received from Mónica Jimenez RN(name) on [employer]  being received from Cath Lab(unit) for routine progression of care      Report consisted of patients Situation, Background, Assessment and   Recommendations(SBAR). Information from the following report(s) SBAR, Kardex, ED Summary, Intake/Output, MAR, Recent Results and Med Rec Status was reviewed with the receiving nurse. Opportunity for questions and clarification was provided. Assessment completed upon patients arrival to unit and care assumed. 1130 Pt arrived in room. Telemetry placed and confirmed with monitor tech. VS obtained. Right groin site c/d/i, no bleeding, no hematoma. Pedal pulses palpable. Pt resting comfortably. 1400 /33. Pt denied dizziness, lightheadedness, N/V. Dr. Christine Hodges notified, no new orders received. 1800 /43. Dr. Christine Hodges informed of carvedilol 6.25 mg scheduled for this evening. VORB to hold carvedilol this evening. 1910 /29. Pt denies dizziness, lightheadedness, N/V. Hospitalist paged. [address] responded to page. Orders received. [address] drawn and delivered to lab via tube system. 500 cc NS administered at 100 mL/hr.     1945 Bedside and Verbal shift change report given to [facility] RN (oncoming nurse) by Prince Alysia RN (offgoing nurse). Report included the following information SBAR, Kardex, Intake/Output, MAR, Recent Results and Med Rec Status.

## 2021-04-05 NOTE — PROGRESS NOTES
Hospitalist Night Cover     Name: Julee Duffy  YOB: 1940      Overnight update:        Paged by RN - /49 HR 59. Baseline BP during visit 130-150/50-70. Resting with no complaints at present, denies chest pain, dizziness, lightheadedness.  S/p renal angiogram today, insertion site without complications per RN.  - Hold antihypertensives tonight, bumex already on hold  - Check labs now, assess HGB post proprocedural  - Gentle IVF bolus     Neli Taylor FNP-C, PA-C  [phone] or [employer]

## 2021-04-06 LAB
ALBUMIN SERPL-MCNC: 2.5 G/DL (ref 3.5–5)
ALBUMIN/GLOB SERPL: 1 {RATIO} (ref 1.1–2.2)
ALP SERPL-CCNC: 58 U/L (ref 45–117)
ALT SERPL-CCNC: 8 U/L (ref 12–78)
ANION GAP SERPL CALC-SCNC: 5 MMOL/L (ref 5–15)
ANION GAP SERPL CALC-SCNC: 5 MMOL/L (ref 5–15)
AST SERPL-CCNC: 12 U/L (ref 15–37)
BILIRUB SERPL-MCNC: 0.3 MG/DL (ref 0.2–1)
BUN SERPL-MCNC: 24 MG/DL (ref 6–20)
BUN SERPL-MCNC: 24 MG/DL (ref 6–20)
BUN/CREAT SERPL: 18 (ref 12–20)
BUN/CREAT SERPL: 20 (ref 12–20)
CALCIUM SERPL-MCNC: 8.3 MG/DL (ref 8.5–10.1)
CALCIUM SERPL-MCNC: 8.6 MG/DL (ref 8.5–10.1)
CHLORIDE SERPL-SCNC: 112 MMOL/L (ref 97–108)
CHLORIDE SERPL-SCNC: 112 MMOL/L (ref 97–108)
CO2 SERPL-SCNC: 23 MMOL/L (ref 21–32)
CO2 SERPL-SCNC: 23 MMOL/L (ref 21–32)
CREAT SERPL-MCNC: 1.22 MG/DL (ref 0.55–1.02)
CREAT SERPL-MCNC: 1.32 MG/DL (ref 0.55–1.02)
ERYTHROCYTE [DISTWIDTH] IN BLOOD BY AUTOMATED COUNT: 16.2 % (ref 11.5–14.5)
ERYTHROCYTE [DISTWIDTH] IN BLOOD BY AUTOMATED COUNT: 16.3 % (ref 11.5–14.5)
GLOBULIN SER CALC-MCNC: 2.4 G/DL (ref 2–4)
GLUCOSE SERPL-MCNC: 128 MG/DL (ref 65–100)
GLUCOSE SERPL-MCNC: 94 MG/DL (ref 65–100)
HCT VFR BLD AUTO: 20.5 % (ref 35–47)
HCT VFR BLD AUTO: 25.7 % (ref 35–47)
HGB BLD-MCNC: 6.5 G/DL (ref 11.5–16)
HGB BLD-MCNC: 7.6 G/DL (ref 11.5–16)
HISTORY CHECKED?,CKHIST: NORMAL
MAGNESIUM SERPL-MCNC: 1.9 MG/DL (ref 1.6–2.4)
MCH RBC QN AUTO: 31.5 PG (ref 26–34)
MCH RBC QN AUTO: 32.2 PG (ref 26–34)
MCHC RBC AUTO-ENTMCNC: 29.6 G/DL (ref 30–36.5)
MCHC RBC AUTO-ENTMCNC: 31.7 G/DL (ref 30–36.5)
MCV RBC AUTO: 101.5 FL (ref 80–99)
MCV RBC AUTO: 106.6 FL (ref 80–99)
NRBC # BLD: 0 K/UL (ref 0–0.01)
NRBC # BLD: 0 K/UL (ref 0–0.01)
NRBC BLD-RTO: 0 PER 100 WBC
NRBC BLD-RTO: 0 PER 100 WBC
PLATELET # BLD AUTO: 176 K/UL (ref 150–400)
PLATELET # BLD AUTO: 186 K/UL (ref 150–400)
PMV BLD AUTO: 8.7 FL (ref 8.9–12.9)
PMV BLD AUTO: 8.7 FL (ref 8.9–12.9)
POTASSIUM SERPL-SCNC: 4 MMOL/L (ref 3.5–5.1)
POTASSIUM SERPL-SCNC: 4 MMOL/L (ref 3.5–5.1)
PROT SERPL-MCNC: 4.9 G/DL (ref 6.4–8.2)
RBC # BLD AUTO: 2.02 M/UL (ref 3.8–5.2)
RBC # BLD AUTO: 2.41 M/UL (ref 3.8–5.2)
SODIUM SERPL-SCNC: 140 MMOL/L (ref 136–145)
SODIUM SERPL-SCNC: 140 MMOL/L (ref 136–145)
WBC # BLD AUTO: 5.7 K/UL (ref 3.6–11)
WBC # BLD AUTO: 6.2 K/UL (ref 3.6–11)

## 2021-04-06 PROCEDURE — 74011250637 HC RX REV CODE- 250/637: Performed by: INTERNAL MEDICINE

## 2021-04-06 PROCEDURE — 30233N1 TRANSFUSION OF NONAUTOLOGOUS RED BLOOD CELLS INTO PERIPHERAL VEIN, PERCUTANEOUS APPROACH: ICD-10-PCS | Performed by: INTERNAL MEDICINE

## 2021-04-06 PROCEDURE — 74011250636 HC RX REV CODE- 250/636: Performed by: INTERNAL MEDICINE

## 2021-04-06 PROCEDURE — 86901 BLOOD TYPING SEROLOGIC RH(D): CPT

## 2021-04-06 PROCEDURE — 65660000000 HC RM CCU STEPDOWN

## 2021-04-06 PROCEDURE — 80053 COMPREHEN METABOLIC PANEL: CPT

## 2021-04-06 PROCEDURE — 74011250636 HC RX REV CODE- 250/636: Performed by: FAMILY MEDICINE

## 2021-04-06 PROCEDURE — 85027 COMPLETE CBC AUTOMATED: CPT

## 2021-04-06 PROCEDURE — 74011250637 HC RX REV CODE- 250/637: Performed by: NURSE PRACTITIONER

## 2021-04-06 PROCEDURE — 74011000258 HC RX REV CODE- 258: Performed by: FAMILY MEDICINE

## 2021-04-06 PROCEDURE — 83735 ASSAY OF MAGNESIUM: CPT

## 2021-04-06 PROCEDURE — 74011250637 HC RX REV CODE- 250/637: Performed by: FAMILY MEDICINE

## 2021-04-06 PROCEDURE — 99233 SBSQ HOSP IP/OBS HIGH 50: CPT | Performed by: INTERNAL MEDICINE

## 2021-04-06 PROCEDURE — 74011250636 HC RX REV CODE- 250/636: Performed by: NURSE PRACTITIONER

## 2021-04-06 PROCEDURE — 36415 COLL VENOUS BLD VENIPUNCTURE: CPT

## 2021-04-06 PROCEDURE — 86923 COMPATIBILITY TEST ELECTRIC: CPT

## 2021-04-06 RX ORDER — BUMETANIDE 1 MG/1
1 TABLET ORAL DAILY
Status: DISCONTINUED | OUTPATIENT
Start: 2021-04-07 | End: 2021-04-09 | Stop reason: HOSPADM

## 2021-04-06 RX ORDER — HYDRALAZINE HYDROCHLORIDE 25 MG/1
25 TABLET, FILM COATED ORAL 3 TIMES DAILY
Status: DISCONTINUED | OUTPATIENT
Start: 2021-04-06 | End: 2021-04-09 | Stop reason: HOSPADM

## 2021-04-06 RX ORDER — MORPHINE SULFATE 2 MG/ML
2 INJECTION, SOLUTION INTRAMUSCULAR; INTRAVENOUS ONCE
Status: COMPLETED | OUTPATIENT
Start: 2021-04-06 | End: 2021-04-06

## 2021-04-06 RX ORDER — MORPHINE SULFATE 2 MG/ML
1 INJECTION, SOLUTION INTRAMUSCULAR; INTRAVENOUS ONCE
Status: COMPLETED | OUTPATIENT
Start: 2021-04-06 | End: 2021-04-06

## 2021-04-06 RX ORDER — CARVEDILOL 3.12 MG/1
3.12 TABLET ORAL 2 TIMES DAILY WITH MEALS
Status: DISCONTINUED | OUTPATIENT
Start: 2021-04-06 | End: 2021-04-09 | Stop reason: HOSPADM

## 2021-04-06 RX ORDER — SODIUM CHLORIDE 9 MG/ML
250 INJECTION, SOLUTION INTRAVENOUS AS NEEDED
Status: DISCONTINUED | OUTPATIENT
Start: 2021-04-06 | End: 2021-04-09 | Stop reason: HOSPADM

## 2021-04-06 RX ORDER — POLYETHYLENE GLYCOL 3350 17 G/17G
17 POWDER, FOR SOLUTION ORAL DAILY
Status: DISCONTINUED | OUTPATIENT
Start: 2021-04-06 | End: 2021-04-09 | Stop reason: HOSPADM

## 2021-04-06 RX ADMIN — CARBIDOPA AND LEVODOPA 2 TABLET: 25; 100 TABLET ORAL at 08:37

## 2021-04-06 RX ADMIN — CARBIDOPA AND LEVODOPA 2 TABLET: 25; 100 TABLET ORAL at 21:14

## 2021-04-06 RX ADMIN — MEROPENEM 0.5 G: 500 INJECTION, POWDER, FOR SOLUTION INTRAVENOUS at 03:59

## 2021-04-06 RX ADMIN — PANTOPRAZOLE SODIUM 40 MG: 40 TABLET, DELAYED RELEASE ORAL at 06:57

## 2021-04-06 RX ADMIN — SODIUM CHLORIDE 1000 ML: 9 INJECTION, SOLUTION INTRAVENOUS at 19:12

## 2021-04-06 RX ADMIN — CARVEDILOL 3.12 MG: 3.12 TABLET, FILM COATED ORAL at 17:38

## 2021-04-06 RX ADMIN — Medication 10 ML: at 06:57

## 2021-04-06 RX ADMIN — MEROPENEM 500 MG: 500 INJECTION, POWDER, FOR SOLUTION INTRAVENOUS at 18:33

## 2021-04-06 RX ADMIN — Medication 10 ML: at 13:26

## 2021-04-06 RX ADMIN — CLOPIDOGREL BISULFATE 75 MG: 75 TABLET ORAL at 08:37

## 2021-04-06 RX ADMIN — ACETAMINOPHEN 650 MG: 325 TABLET ORAL at 18:29

## 2021-04-06 RX ADMIN — CARBIDOPA AND LEVODOPA 2 TABLET: 25; 100 TABLET ORAL at 17:38

## 2021-04-06 RX ADMIN — MEROPENEM 500 MG: 500 INJECTION, POWDER, FOR SOLUTION INTRAVENOUS at 10:44

## 2021-04-06 RX ADMIN — CARBIDOPA AND LEVODOPA 2 TABLET: 25; 100 TABLET ORAL at 13:27

## 2021-04-06 RX ADMIN — SODIUM CHLORIDE 50 ML/HR: 9 INJECTION, SOLUTION INTRAVENOUS at 01:02

## 2021-04-06 RX ADMIN — POLYETHYLENE GLYCOL 3350 17 G: 17 POWDER, FOR SOLUTION ORAL at 17:38

## 2021-04-06 RX ADMIN — MORPHINE SULFATE 2 MG: 2 INJECTION, SOLUTION INTRAMUSCULAR; INTRAVENOUS at 12:01

## 2021-04-06 RX ADMIN — ACETAMINOPHEN 650 MG: 325 TABLET ORAL at 05:11

## 2021-04-06 RX ADMIN — HYDRALAZINE HYDROCHLORIDE 25 MG: 25 TABLET, FILM COATED ORAL at 21:14

## 2021-04-06 RX ADMIN — Medication 10 ML: at 21:15

## 2021-04-06 RX ADMIN — DULOXETINE HYDROCHLORIDE 120 MG: 60 CAPSULE, DELAYED RELEASE ORAL at 08:37

## 2021-04-06 RX ADMIN — MORPHINE SULFATE 1 MG: 2 INJECTION, SOLUTION INTRAMUSCULAR; INTRAVENOUS at 19:21

## 2021-04-06 RX ADMIN — ISOSORBIDE MONONITRATE 30 MG: 30 TABLET, EXTENDED RELEASE ORAL at 08:37

## 2021-04-06 RX ADMIN — ASPIRIN 81 MG: 81 TABLET, COATED ORAL at 21:14

## 2021-04-06 RX ADMIN — ATORVASTATIN CALCIUM 40 MG: 40 TABLET, FILM COATED ORAL at 21:14

## 2021-04-06 RX ADMIN — CARVEDILOL 3.12 MG: 3.12 TABLET, FILM COATED ORAL at 08:37

## 2021-04-06 NOTE — PROGRESS NOTES
[address] [facility] Adult  Hospitalist Group                                                                                          Hospitalist Progress Note  Yamilet Olivo MD  Answering service: [phone] OR [phone] from in house phone              Progress Note    Patient: Jamir Singh MRN: [id number]  SSN: [id number]    YOB: 1940  Age: 79 y.o. Sex: female      Admit Date: 3/29/2021    LOS: 8 days     Subjective:     Patient presents with acute on chronic CHF as well as A. fib with RVR. Breathing is improving and currently rate is controlled. Patient has no acute complaint today. S/p cardiac cath with placement of stent to LAD. Overall doing well. Objective:     Vitals:    04/06/21 1200 04/06/21 1215 04/06/21 1230 04/06/21 1300   BP: (!) 117/43 (!) 119/40 (!) 129/43 (!) 150/46   Pulse: (!) 56 (!) 52 (!) 56 (!) 52   Resp: (!) 31 23 22 13   Temp:       SpO2: 100% 99% 100% 100%   Weight:       Height:            Intake and Output:  Current Shift: 04/06 0701 - 04/06 1900  In: -   Out: 200 [Urine:200]  Last three shifts: 04/04 1901 - 04/06 0700  In: 2020 [P.O.:580; I.V.:1440]  Out: 1450 [Urine:1450]    Physical Exam:   GENERAL: alert, cooperative, no distress, appears stated age  THROAT & NECK: normal and no erythema or exudates noted. LUNG: clear to auscultation bilaterally  HEART: regular rate and rhythm, S1, S2 normal, no murmur, click, rub or gallop  ABDOMEN: soft, non-tender. Bowel sounds normal. No masses,  no organomegaly  EXTREMITIES:  extremities normal, atraumatic, no cyanosis or edema  SKIN: no rash or abnormalities  NEUROLOGIC: AOx3. PSYCHIATRIC: non focal    Lab/Data Review: All lab results for the last 24 hours reviewed.      Recent Results (from the past 24 hour(s))   CBC WITH AUTOMATED DIFF    Collection Time: 04/05/21  7:22 PM   Result Value Ref Range    WBC 5.0 3.6 - 11.0 K/uL    RBC 2.66 (L) 3.80 - 5.20 M/uL    HGB 8.4 (L) 11.5 - 16.0 g/dL    HCT 26.4 (L) 35.0 - 47.0 %    MCV 99.2 (H) 80.0 - 99.0 FL    MCH 31.6 26.0 - 34.0 PG    MCHC 31.8 30.0 - 36.5 g/dL    RDW 16.1 (H) 11.5 - 14.5 %    PLATELET 281 196 - 748 K/uL    MPV 8.5 (L) 8.9 - 12.9 FL    NRBC 0.0 0  WBC    ABSOLUTE NRBC 0.00 0.00 - 0.01 K/uL    NEUTROPHILS 70 32 - 75 %    LYMPHOCYTES 16 12 - 49 %    MONOCYTES 9 5 - 13 %    EOSINOPHILS 4 0 - 7 %    BASOPHILS 1 0 - 1 %    IMMATURE GRANULOCYTES 0 0.0 - 0.5 %    ABS. NEUTROPHILS 3.5 1.8 - 8.0 K/UL    ABS. LYMPHOCYTES 0.8 0.8 - 3.5 K/UL    ABS. MONOCYTES 0.4 0.0 - 1.0 K/UL    ABS. EOSINOPHILS 0.2 0.0 - 0.4 K/UL    ABS. BASOPHILS 0.0 0.0 - 0.1 K/UL    ABS. IMM. GRANS. 0.0 0.00 - 0.04 K/UL    DF AUTOMATED     MAGNESIUM    Collection Time: 04/05/21  7:22 PM   Result Value Ref Range    Magnesium 1.7 1.6 - 2.4 mg/dL   METABOLIC PANEL, COMPREHENSIVE    Collection Time: 04/05/21  7:22 PM   Result Value Ref Range    Sodium 141 136 - 145 mmol/L    Potassium 3.6 3.5 - 5.1 mmol/L    Chloride 112 (H) 97 - 108 mmol/L    CO2 23 21 - 32 mmol/L    Anion gap 6 5 - 15 mmol/L    Glucose 135 (H) 65 - 100 mg/dL    BUN 22 (H) 6 - 20 MG/DL    Creatinine 1.44 (H) 0.55 - 1.02 MG/DL    BUN/Creatinine ratio 15 12 - 20      GFR est AA 43 (L) >60 ml/min/1.73m2    GFR est non-AA 35 (L) >60 ml/min/1.73m2    Calcium 8.7 8.5 - 10.1 MG/DL    Bilirubin, total 0.6 0.2 - 1.0 MG/DL    ALT (SGPT) <6 (L) 12 - 78 U/L    AST (SGOT) 11 (L) 15 - 37 U/L    Alk.  phosphatase 66 45 - 117 U/L    Protein, total 5.5 (L) 6.4 - 8.2 g/dL    Albumin 2.9 (L) 3.5 - 5.0 g/dL    Globulin 2.6 2.0 - 4.0 g/dL    A-G Ratio 1.1 1.1 - 2.2     PTT    Collection Time: 04/05/21  7:22 PM   Result Value Ref Range    aPTT 25.6 22.1 - 31.0 sec    aPTT, therapeutic range     58.0 - 77.0 SECS   TROPONIN I    Collection Time: 04/05/21  7:22 PM   Result Value Ref Range    Troponin-I, Qt. 0.34 (H) <0.05 ng/mL   CBC W/O DIFF    Collection Time: 04/06/21  5:38 AM   Result Value Ref Range    WBC 6.2 3.6 - 11.0 K/uL    RBC 2.41 (L) 3.80 - 5.20 M/uL    HGB 7.6 (L) 11.5 - 16.0 g/dL    HCT 25.7 (L) 35.0 - 47.0 %    .6 (H) 80.0 - 99.0 FL    MCH 31.5 26.0 - 34.0 PG    MCHC 29.6 (L) 30.0 - 36.5 g/dL    RDW 16.3 (H) 11.5 - 14.5 %    PLATELET 441 859 - 502 K/uL    MPV 8.7 (L) 8.9 - 12.9 FL    NRBC 0.0 0  WBC    ABSOLUTE NRBC 0.00 0.00 - 8.46 K/uL   METABOLIC PANEL, BASIC    Collection Time: 04/06/21  5:38 AM   Result Value Ref Range    Sodium 140 136 - 145 mmol/L    Potassium 4.0 3.5 - 5.1 mmol/L    Chloride 112 (H) 97 - 108 mmol/L    CO2 23 21 - 32 mmol/L    Anion gap 5 5 - 15 mmol/L    Glucose 94 65 - 100 mg/dL    BUN 24 (H) 6 - 20 MG/DL    Creatinine 1.32 (H) 0.55 - 1.02 MG/DL    BUN/Creatinine ratio 18 12 - 20      GFR est AA 47 (L) >60 ml/min/1.73m2    GFR est non-AA 39 (L) >60 ml/min/1.73m2    Calcium 8.6 8.5 - 10.1 MG/DL        Imaging:           Assessment and Plan:     Congestive heart failure  -Acute systolic congestive heart failure NYHA class III  -Ischemic cardiomyopathy with EF of 25-30, new onset  -Diuresis with Bumex, continue hydralazine, nitrates and Coreg  -Cardiology following    Coronary artery disease  -Status post cardiac cath 4/1, s/p PCI with STEPHAN to LAD  -Continue dual antiplatelet  -Cardiology following    Possible LV thrombus  -Noted on initial echocardiogram  -Repeat limited echo reviewed by cardiology, ruled out for LV thrombus    Diaphoresis  -Low probability for PE on V/Q  -Oxygenation improving and patient clinically improved    Atrial fibrillation with RVR  -Rate is now controlled, continue Coreg  -Not on anticoagulation at current    Valvular heart disease  -Moderate to severe mitral and tricuspid regurgitation on echo  -Plan per cardiology    MORENO  -MORENO on CKD stage III  -Improving renal function  -Nephrology following  -Continue diuretic    Renal artery stenosis  -Left renal artery high-grade stenosis and mild stenosis of the right renal artery on MRA of the abdomen  -Renal angiogram, bilateral, today, does not show critical disease (50-70% stenosis on left and 40-50% stenosis on right)    Aortic aneurysm  -Infrarenal aortic aneurysm of 3.9 cm, incidental finding on MRA of the abdomen  -Outpatient follow-up     Urinary tract infection  -Urine culture show ESBL Klebsiella  -Continue meropenem  -ID consult for antibiotic recommendation upon discharge    Hypertension  -Continue amlodipine, Coreg and hydralazine  -Blood pressure is stable    Parkinson disease   -Continue Sinemet    Anemia  -Anemia of chronic kidney disease  -H&H is stable    Insomnia  -Ambien as needed    Discharge disposition: Likely 24 hours pending progress. PT evaluation.     Signed By: Smiley Lester MD     April 6, 2021

## 2021-04-06 NOTE — PROGRESS NOTES
Preceptor Review of [employer]    4/5/2021  - Shift times - 1930 to 0800    The student documentation of patient care for Maria R Aquino has been reviewed and approved. All medications have been administered under the direct supervision of the faculty or preceptor.     [facility]

## 2021-04-06 NOTE — PROGRESS NOTES
ANDREA: anticipate d/c to SNF-referrals pending to [facility] in [address] in [address]; Therapy notes pending; Will need Racheal Co jesse for SNF placement; S Transport; Will need 2nd IMM letter prior to d/c     RUR: 29%    -Cardiology following, s/p PCI/STEPHAN,   -Nephrology following, MORENO  -ID following, remains on IV ABX for tx of ESBL UTI  -PT/OT following, recommendations pending    1330-CM reviewed pt chart & met with pt at bedside to discuss transitional planning. Therapy recommendations are pending, however pt resides alone & CM anticipates that pt may qualify for SNF due to prolonged hospitalization. CM met with pt at bedside and discussed SNF choices, pt agreeable to [facility]. Accordingly, CM placed referrals. CM to follow. Transition of Care Plan:     The Plan for Transition of Care is related to the following treatment goals: SNF    The Patient and/or patient representative  was provided with a choice of provider and agrees  with the discharge plan. Yes [x] No []    A Freedom of choice list was provided with basic dialogue that supports the patient's individualized plan of care/goals and shares the quality data associated with the providers.        Yes [x] No []  Pascual Simons RN BSN CCM

## 2021-04-06 NOTE — PROGRESS NOTES
0730 Bedside and Verbal shift change report given to Reginald Guerra RN (oncoming nurse) by Erika Crouch RN (offgoing nurse). Report included the following information SBAR, Kardex, Intake/Output, MAR, Recent Results and Med Rec Status. 36 Dr. Andrea Valdez paged. Hematoma present in right groin. Pressure held for 10 minutes. Right pedal pulse dopplerable. [facility] [facility], NP at bedside. Right groin site hematoma developed again. 1150 Dr. Andrea Valdez at bedside and applied femstop. 1200 Femostop: 62. Reduced to 52.    1210 Femostop reduced to 42.     1220 Femostop reduced to 30.    1230 Femostop removed. Hematoma marked. Pulses palpable. 1755 Increased hematoma noted after pt ate dinner. Magali Hall NP at bedside. Dr. Phi Coy reapply femostop to 80 mmHg and reduce every 15 minutes. Once femostop at 30 mmHg, it may be removed. 1800 Femostop applied at 82 mmHg. Pulses palpable distal to right groin site. 1815 Femostop reduced to 70 mmHg. 1830 Femostop reduced to 55 mmHg. 1845 BP 82/31. Paged Dr. Andrea Valdez. Pain 10/10.     1850 Dr. Jahaira Guerrero responded to page. VORB for CBC, CMP, Type &Cross, Mag, and 1 Liter NS bolus. If SBP is not > 90, Dr. Omar Jorgensen for dopamine. Evaluate pt for ICU transfer. 1855 Rapid Response called for low BPs, concern for bleeding at right groin site. Rustam Liang NP at bedside VORB for one dose of 1 mg morphine IVP     1955 Dr. Wayne Lesch at bedside performing RT ultrasound. Per intensivist, no active bleed. Pt to remain on CVSU. BP improved, 145/64.     2005 Bedside and Verbal shift change report given to Emily Samson RN (oncoming nurse) by Reginald Guerra RN (offgoing nurse). Report included the following information SBAR, Kardex, Intake/Output, MAR, Recent Results, Med Rec Status and Cardiac Rhythm SB.     2030 /38. Femostop reduced to 35 mmHg. Hematoma stable.            Problem: Falls - Risk of  Goal: *Absence of Falls  Description: Document Maylin Fall Risk and appropriate interventions in the flowsheet. Outcome: Progressing Towards Goal  Note: Fall Risk Interventions:  Mobility Interventions: Communicate number of staff needed for ambulation/transfer, OT consult for ADLs, Patient to call before getting OOB, PT Consult for mobility concerns, Strengthening exercises (ROM-active/passive), Utilize gait belt for transfers/ambulation         Medication Interventions: Evaluate medications/consider consulting pharmacy, Patient to call before getting OOB, Teach patient to arise slowly, Utilize gait belt for transfers/ambulation    Elimination Interventions: Call light in reach, Patient to call for help with toileting needs, Stay With Me (per policy), Toilet paper/wipes in reach, Toileting schedule/hourly rounds, Bed/chair exit alarm              Problem: Patient Education: Go to Patient Education Activity  Goal: Patient/Family Education  Outcome: Progressing Towards Goal     Problem: Patient Education: Go to Patient Education Activity  Goal: Patient/Family Education  Outcome: Progressing Towards Goal     Problem: Pressure Injury - Risk of  Goal: *Prevention of pressure injury  Description: Document Gregorio Scale and appropriate interventions in the flowsheet.   Outcome: Progressing Towards Goal  Note: Pressure Injury Interventions:  Sensory Interventions: Minimize linen layers, Keep linens dry and wrinkle-free, Pressure redistribution bed/mattress (bed type)    Moisture Interventions: Apply protective barrier, creams and emollients, Absorbent underpads, Check for incontinence Q2 hours and as needed, Internal/External urinary devices, Maintain skin hydration (lotion/cream), Minimize layers, Offer toileting Q_hr    Activity Interventions: Chair cushion, Increase time out of bed, Pressure redistribution bed/mattress(bed type), PT/OT evaluation    Mobility Interventions: Chair cushion, HOB 30 degrees or less, Pressure redistribution bed/mattress (bed type), PT/OT evaluation    Nutrition Interventions: Document food/fluid/supplement intake    Friction and Shear Interventions: Apply protective barrier, creams and emollients, Lift sheet, Minimize layers                Problem: Patient Education: Go to Patient Education Activity  Goal: Patient/Family Education  Outcome: Progressing Towards Goal

## 2021-04-06 NOTE — PROGRESS NOTES
NUTRITION  Reason for Rescreen: LOS        RECOMMENDATIONS:   Add bowel regimen - no BM since 4/2  Interventions   - continue current diet       Information obtained from:   Chart review. Past Medical History:   Diagnosis Date    Anxiety disorder     Atrial fibrillation (HCC)     CAD (coronary artery disease) 2007    stents, CABG x 3v    Carotid stenosis     Cervical stenosis of spinal canal 07/2019    Chronic kidney disease     Cough     CVA [facility]cerebral vascular accident) ([facility] [address] 07/2019    left lacunar infarct at head of caudate    Depression     AND CHRONIC ANXIETY    Diabetes (HCC)     GERD (gastroesophageal reflux disease)     High cholesterol     History of peptic ulcer     Bleeding ulcer with increased NSAID use    Hypertension     Left carotid stenosis 07/2019    s/p left CEA with Dr. Gerson Cummings, old 2007    PUD (peptic ulcer disease)     Stroke ([facility] [address]     Tremor     Valvular heart disease      Pt admitted for CHF. S/p PCI and heart cath but plans for medical mgmt notes. Possible d/c to facility tomorrow pending groin hematoma. Some wt loss noted but on diuresis so hard to assess trends. Will follow. Pt sleeping soundly at time of visit. Breakfast and lunch trays at bedside still with >75% of each consumed. Constipation noted with last BM on 4/2 - reached out to provided for bowel regimen if appropriate.      Diet:  cardiac  Supplements: None  Meal intake:   Patient Vitals for the past 168 hrs:   % Diet Eaten   04/06/21 1300 75 %   04/06/21 0900 90 %   04/05/21 1130 0 %   04/04/21 1733 25 %   04/04/21 1150 50 %   04/04/21 0845 50 %   04/03/21 1906 100 %   04/02/21 1708 75 %   04/02/21 1337 90 %   04/02/21 0809 100 %   04/01/21 1649 100 %   03/30/21 1826 80 %     Weight Changes:   Loss - diuresis, 1+ BLLE present  Wt Readings from Last 10 Encounters:   04/06/21 68.7 kg (151 lb 7.3 oz)   10/04/20 74.6 kg (164 lb 8 oz)   05/06/20 79.6 kg (175 lb 7.8 oz)   05/01/20 73.5 kg (162 lb) 03/18/20 72.6 kg (160 lb)   12/12/19 76.2 kg (168 lb)   08/13/19 71 kg (156 lb 9.6 oz)   07/31/19 79 kg (174 lb 3.2 oz)   07/18/19 74 kg (163 lb 2.3 oz)   02/12/19 73.5 kg (162 lb)     Nutrition-Related Concerns Identified:  Constipation    Estimated Nutrition Needs:   Energy: 1496-1700kcal(22-25kcal/kg)  Wt used: Current(69kg)  Protein: 76 - 1.1g/kg  Wt used: Current(69kg)   Fluid: 1500     PLAN:   - Continue current diet  - Follow wt trends    Will revisit per policy    Love Mera RD [facility], Pager #[phone] or via hovelstay

## 2021-04-06 NOTE — CONSULTS
Infectious Disease Consult    Today's Date: 4/6/2021   Admit Date: 3/29/2021    Impression:   · Klebsiella UTI (ESBL)  · Pyuria  · Mild debility  · CHF/atrial fibrillation    Plan:   · Continue IV antibiotics while in house and change to oral Macrobid at discharge to complete 1 week of therapy--completes therapy 4/11    Anti-infectives:   · Merrem    Subjective:   Date of Consultation:  April 6, 2021  Referring Physician: Dr Alphonso Interiano    Patient is a 79 y.o. female admitted with heart failure. She was found to be in atrial fibrillation as well. She had pyuria on admission UA and Klebsiella has grown from culture. This is an ESBL organism. She has been on IV Merrem since the fourth and we are asked to see her in consultation. She offers no specific complaints at this time. Patient Active Problem List   Diagnosis Code    Hypotension I95.9    CAD (coronary artery disease) I25.10    S/P coronary artery stent placement Z95.5    Renal failure N19    Elevated troponin R77.8    Pericardial effusion I31.3    Lactic acidosis E87.2    AF (atrial fibrillation) ([facility]) I48.91    Anemia D64.9    GI bleed K92.2    Syncope R55    Bradycardia R00.1    MORENO (acute kidney injury) ([facility] [address] N17.9    Postoperative anemia due to acute blood loss D62    S/P CABG (coronary artery bypass graft) Z95.1    Edema R60.9    Diabetes mellitus ([facility]) E11.9    CKD (chronic kidney disease), stage III ([facility]) N18.30    Fall W19. Minh Beard    Acute ischemic stroke ([facility] [address] I63.9    HTN (hypertension) I10    Carotid artery stenosis, symptomatic, left I65.22    Generalized weakness R53.1    Weakness R53.1    CHF (congestive heart failure) ([facility]) I50.9    CHF exacerbation ([facility]) I50.9     Past Medical History:   Diagnosis Date    Anxiety disorder     Atrial fibrillation ([facility])     CAD (coronary artery disease) 2007    stents, CABG x 3v    Carotid stenosis     Cervical stenosis of spinal canal 07/2019    Chronic kidney disease     Cough     CVA (cerebral vascular accident) ([facility] [address] 2019    left lacunar infarct at head of caudate    Depression     AND CHRONIC ANXIETY    Diabetes ([facility] [address]     GERD (gastroesophageal reflux disease)     High cholesterol     History of peptic ulcer     Bleeding ulcer with increased NSAID use    Hypertension     Left carotid stenosis 2019    s/p left CEA with Dr. Cary Salazar, old     PUD (peptic ulcer disease)     Stroke ([facility] [address]     Tremor     Valvular heart disease       Family History   Problem Relation Age of Onset    Heart Attack Mother 72        Dec 80yo    Hypertension Mother     Other Father         Unknown    Parkinson's Disease Brother     Anesth Problems Neg Hx       Social History     Tobacco Use    Smoking status: Former Smoker     Packs/day: 0.25     Years: 5.00     Pack years: 1.25     Types: Cigarettes     Quit date:      Years since quittin.2    Smokeless tobacco: Never Used   Substance Use Topics    Alcohol use: Yes     Alcohol/week: 0.0 standard drinks     Comment: Rare     Past Surgical History:   Procedure Laterality Date    CARDIAC SURG PROCEDURE UNLIST      CABG X3 VESSEL    HX CAROTID ENDARTERECTOMY  2019    HX CORONARY ARTERY BYPASS GRAFT      HX TONSILLECTOMY  1963    TOTAL KNEE ARTHROPLASTY Right 2015      Prior to Admission medications    Medication Sig Start Date End Date Taking? Authorizing Provider   amLODIPine (NORVASC) 5 mg tablet Take 1 Tab by mouth daily. 10/5/20   Corinne Alvarez MD   carvediloL (COREG) 3.125 mg tablet Take 1 Tab by mouth two (2) times daily (with meals). 10/4/20   Corinne Alvarez MD   hydrALAZINE (APRESOLINE) 25 mg tablet Take 1.5 Tabs by mouth three (3) times daily. 10/4/20   Corinne Alvarez MD   naloxone [facility]) 4 mg/actuation nasal spray Use 1 spray intranasally, then discard. Repeat with new spray every 2 min as needed for opioid overdose symptoms, alternating nostrils.  10/4/20   Corinne Alvarez MD   furosemide (Lasix) 20 mg tablet Take 1 Tab by mouth daily. 10/4/20   Tejal Earl MD   atorvastatin (LIPITOR) 40 mg tablet Take 40 mg by mouth nightly. Provider, Historical   nitroglycerin (Nitrostat) 0.4 mg SL tablet 0.4 mg by SubLINGual route every five (5) minutes as needed for Chest Pain. Up to 3 doses. Provider, Historical   aspirin delayed-release (Ecotrin Low Strength) 81 mg tablet Take 81 mg by mouth nightly. Provider, Historical   clopidogreL (Plavix) 75 mg tab Take 75 mg by mouth daily (after breakfast). Provider, Historical   carbidopa-levodopa (SINEMET)  mg per tablet Take 2 Tabs by mouth four (4) times daily. 2/4/20   Kenny Ceballos MD   acetaminophen (TYLENOL) 325 mg tablet Take 650 mg by mouth every six (6) hours as needed for Pain or Fever. Provider, Historical   cyanocobalamin (VITAMIN B12) 100 mcg tablet Take 100 mcg by mouth daily. Provider, Historical   vit C,S-Oz-yxzoy-lutein-zeaxan (PRESERVISION AREDS-2) [phone] mg-unit-mg-mg cap capsule Take 1 Cap by mouth two (2) times a day. Provider, Historical   senna-docusate (SENNA WITH DOCUSATE SODIUM) 8.6-50 mg per tablet Take 1 Tab by mouth nightly. Provider, Historical   Cholecalciferol, Vitamin D3, 1,000 unit cap Take 1,000 Units by mouth daily. Provider, Historical   pantoprazole (PROTONIX) 40 mg tablet Take 40 mg by mouth Daily (before breakfast). Indications: h/o bleeding ulcer with nsaid    Provider, Historical   DULoxetine (CYMBALTA) 60 mg capsule Take 120 mg by mouth daily. Indications: Anxiousness associated with Depression    Provider, Historical   multivitamins-minerals-lutein (CENTRUM SILVER) tab tablet Take 1 Tab by mouth daily. Provider, Historical       No Known Allergies     Review of Systems:  A comprehensive review of systems was negative except for that written in the History of Present Illness. Objective:     Visit Vitals  BP (!) 138/41 (BP 1 Location: Right lower arm, BP Patient Position:  At rest)   Pulse (!) 54   Temp 98.2 °F (36.8 °C)   Resp 18   Ht 5' 5\" (1.651 m)   Wt 68.7 kg (151 lb 7.3 oz)   SpO2 97%   BMI 25.20 kg/m²     Temp (24hrs), Av.3 °F (36.8 °C), Min:98 °F (36.7 °C), Max:98.8 °F (37.1 °C)       Lines:  Peripheral IV:       Physical Exam:  Lungs:  clear to auscultation bilaterally  Heart:  regular rate and rhythm  Abdomen:  soft, non-tender. Bowel sounds normal. No masses,  no organomegaly  Skin:  no rash or abnormalities    Data Review:     CBC:  Recent Labs     21  0341   WBC 6.2 5.0 4.9   GRANS  --  70 63   MONOS  --  9 12   EOS  --  4 6   ANEU  --  3.5 3.1   ABL  --  0.8 0.9   HGB 7.6* 8.4* 9.0*   HCT 25.7* 26.4* 27.9*    195 201       BMP:  Recent Labs     21  0353   CREA 1.32* 1.44* 1.84*   BUN 24* 22* 28*    141 138   K 4.0 3.6 3.7   * 112* 108   CO2 23 23 24   AGAP 5 6 6   GLU 94 135* 117*       LFTS:  Recent Labs     21  0353   TBILI 0.6  --    ALT <6*  --    AP 66  --    TP 5.5*  --    ALB 2.9* 3.1*       Microbiology:     All Micro Results     Procedure Component Value Units Date/Time    CULTURE, URINE [[id number]]  (Abnormal)  (Susceptibility) Collected: 21 0405    Order Status: Completed Specimen: Urine from Clean catch Updated: 21 1015     Special Requests: NO SPECIAL REQUESTS        [place] Count --        >100,000  COLONIES/mL       Culture result:       KLEBSIELLA PNEUMONIAE ** (EXTENDED SPECTRUM BETA LACTAMASE ) **          COVID-19 RAPID TEST [[id number]] Collected: 21 1641    Order Status: Completed Specimen: Nasopharyngeal Updated: 21 1710     Specimen source Nasopharyngeal        COVID-19 rapid test Not detected        Comment: Rapid Abbott ID Now       Rapid NAAT:  The specimen is NEGATIVE for SARS-CoV-2, the novel coronavirus associated with COVID-19.        Negative results should be treated as presumptive and, if inconsistent with clinical signs and symptoms or necessary for patient management, should be tested with an alternative molecular assay. Negative results do not preclude SARS-CoV-2 infection and should not be used as the sole basis for patient management decisions. This test has been authorized by the FDA under an Emergency Use Authorization (EUA) for use by authorized laboratories.    Fact sheet for Healthcare Providers: ConventionUpdate.co.nz  Fact sheet for Patients: ConventionUpdate.co.nz       Methodology: Isothermal Nucleic Acid Amplification               Imaging:   Reviewed    Signed By: Cyndi Soriano MD     April 6, 2021

## 2021-04-06 NOTE — PROGRESS NOTES
Rapid Response Documentation    Name: Eliane Escalona  YOB: 1940  MRN: [id number]  Admission Date: 3/29/2021 11:53 AM      Date of service: 4/6/2021    Active Diagnoses:    Hospital Problems  Date Reviewed: 5/1/2020          Codes Class Noted POA    CHF exacerbation ([facility] [address] ICD-10-CM: I50.9  ICD-9-CM: 428.0  9/25/2020 Unknown              Chief Complaint:  Rapid response called for hypotension. Clinical Presentation:  81-year-old female admitted 3/29/2021 for acute CHF. [facility] 4/1: Patent LIMA to LAD, VG to D1 to OM. S/p PCI/STEPHAN to LAD via LIMA on 4/2. % stenosed distal, prox 80%, mid 50%, cardiology recommending medical management. Recurring problems with right groin hematoma today. S/p renal arteriogram yesterday. Nephrology following MORENO and renal artery disease, MRI confirmed complete left artery occlusion, angios showing 50 to 70% 40 to 50% on the right. Patient has an infra renal cyst    Physical Exam:   · Alert, oriented to person and place  · PERRLA, EOMI. Face metrical, tongue midline  · Skin pale, warm and dry  · Chest clear, only scant bibasilar crackles. SaO2 97% room air  · Abdomen soft, nontender nondistended  · Extremities with only trace lower edema. Right groin hematoma, FemoStop in place at 40 mmHg.   Distal pulses palpable, neuro intact    Patient Vitals for the past 12 hrs:   Temp Pulse Resp BP SpO2   04/06/21 1915  (!) 50 16 (!) 116/33 97 %   04/06/21 1907  (!) 53 26 (!) 107/24 98 %   04/06/21 1900  (!) 57 18 (!) 100/35 97 %   04/06/21 1857  (!) 44 24 (!) 79/30 98 %   04/06/21 1845  (!) 47 18 (!) 79/21 99 %   04/06/21 1843  (!) 51 26 (!) 82/31 99 %   04/06/21 1831  (!) 53 21 (!) 92/42 96 %   04/06/21 1815  60 25 (!) 113/36 95 %   04/06/21 1800  (!) 57 18 (!) 103/40 98 %   04/06/21 1630  (!) 56 15 (!) 148/46 96 %   04/06/21 1600  (!) 56 16 (!) 150/41 98 %   04/06/21 1530  (!) 52 15 (!) 138/47 99 %   04/06/21 1517 98.2 °F (36.8 °C) (!) 54 18 (!) 138/41 97 % 04/06/21 1430  (!) 53 15 (!) 143/42 100 %   04/06/21 1400  (!) 55 18 (!) 121/32 93 %   04/06/21 1330  (!) 54 12 (!) 144/47 100 %   04/06/21 1300  (!) 52 13 (!) 150/46 100 %   04/06/21 1230  (!) 56 22 (!) 129/43 100 %   04/06/21 1215  (!) 52 23 (!) 119/40 99 %   04/06/21 1200  (!) 56 (!) 31 (!) 117/43 100 %   04/06/21 1145  60 17 (!) 102/42 100 %   04/06/21 1130  62 25 (!) 158/138 100 %   04/06/21 1100 98 °F (36.7 °C) (!) 55 15 (!) 127/39 98 %   04/06/21 1037  (!) 56 15 (!) 120/36 98 %   04/06/21 1030  (!) 52 15 (!) 119/36 99 %   04/06/21 1001  60 19 (!) 115/36 97 %   04/06/21 0900  64 18 (!) 133/111 93 %   04/06/21 0833 98.1 °F (36.7 °C) (!) 52 18 (!) 191/52 97 %   04/06/21 0830  (!) 54 17 (!) 191/52 95 %         Data Reviewed: All diagnostic labs and studies have been reviewed. Recent Results (from the past 12 hour(s))   METABOLIC PANEL, COMPREHENSIVE    Collection Time: 04/06/21  6:57 PM   Result Value Ref Range    Sodium 140 136 - 145 mmol/L    Potassium 4.0 3.5 - 5.1 mmol/L    Chloride 112 (H) 97 - 108 mmol/L    CO2 23 21 - 32 mmol/L    Anion gap 5 5 - 15 mmol/L    Glucose 128 (H) 65 - 100 mg/dL    BUN 24 (H) 6 - 20 MG/DL    Creatinine 1.22 (H) 0.55 - 1.02 MG/DL    BUN/Creatinine ratio 20 12 - 20      GFR est AA 52 (L) >60 ml/min/1.73m2    GFR est non-AA 43 (L) >60 ml/min/1.73m2    Calcium 8.3 (L) 8.5 - 10.1 MG/DL    Bilirubin, total 0.3 0.2 - 1.0 MG/DL    ALT (SGPT) 8 (L) 12 - 78 U/L    AST (SGOT) 12 (L) 15 - 37 U/L    Alk.  phosphatase 58 45 - 117 U/L    Protein, total 4.9 (L) 6.4 - 8.2 g/dL    Albumin 2.5 (L) 3.5 - 5.0 g/dL    Globulin 2.4 2.0 - 4.0 g/dL    A-G Ratio 1.0 (L) 1.1 - 2.2     MAGNESIUM    Collection Time: 04/06/21  6:57 PM   Result Value Ref Range    Magnesium 1.9 1.6 - 2.4 mg/dL   CBC W/O DIFF    Collection Time: 04/06/21  6:57 PM   Result Value Ref Range    WBC 5.7 3.6 - 11.0 K/uL    RBC 2.02 (L) 3.80 - 5.20 M/uL    HGB 6.5 (L) 11.5 - 16.0 g/dL    HCT 20.5 (L) 35.0 - 47.0 % .5 (H) 80.0 - 99.0 FL    MCH 32.2 26.0 - 34.0 PG    MCHC 31.7 30.0 - 36.5 g/dL    RDW 16.2 (H) 11.5 - 14.5 %    PLATELET 206 350 - 894 K/uL    MPV 8.7 (L) 8.9 - 12.9 FL    NRBC 0.0 0  WBC    ABSOLUTE NRBC 0.00 0.00 - 0.01 K/uL   TYPE & SCREEN    Collection Time: 04/06/21  6:57 PM   Result Value Ref Range    Crossmatch Expiration 04/09/2021,2359     ABO/Rh(D) B POSITIVE     Antibody screen NEG     Unit number [id number]     Blood component type RC LR     Unit division 00     Status of unit ISSUED     Crossmatch result Compatible    RBC, ALLOCATE    Collection Time: 04/06/21 11:15 PM   Result Value Ref Range    HISTORY CHECKED? Historical check performed    CBC W/O DIFF    Collection Time: 04/06/21 11:47 PM   Result Value Ref Range    WBC 6.5 3.6 - 11.0 K/uL    RBC 2.20 (L) 3.80 - 5.20 M/uL    HGB 6.8 (L) 11.5 - 16.0 g/dL    HCT 21.7 (L) 35.0 - 47.0 %    MCV 98.6 80.0 - 99.0 FL    MCH 30.9 26.0 - 34.0 PG    MCHC 31.3 30.0 - 36.5 g/dL    RDW 16.1 (H) 11.5 - 14.5 %    PLATELET 358 230 - 331 K/uL    MPV 8.8 (L) 8.9 - 12.9 FL    NRBC 0.0 0  WBC    ABSOLUTE NRBC 0.00 0.00 - 0.01 K/uL         Assessment and Plan:    Hypotension  Acute on chronic CHF  · Responding well to fluid bolus ordered by cardiology. Their recommendation is to start dopamine if systolic cannot be maintained above 90 mmHg. Would follow-up bolus with albumin if necessary  · Continue FemoStop to right groin per protocol.   Continue to check distal pulses and neuro  · No active bleeding R groin by bedside US done by intensivist  · Transfuse for hemoglobin 6.8    Patient Vitals for the past 4 hrs:   Temp Pulse Resp BP SpO2   04/07/21 0215 98.4 °F (36.9 °C) 60 22 (!) 117/28 97 %   04/07/21 0156 98.2 °F (36.8 °C) (!) 59 21 104/67 96 %   04/06/21 2349 98.2 °F (36.8 °C) (!) 56 17 (!) 125/31 97 %           Fayrene Jen, CAROLANN, RN, NP-C  [phone] or via [employer]    4/6/2021

## 2021-04-06 NOTE — PROGRESS NOTES
1930: Bedside and Verbal shift [facility] [address] [place] [address] (oncoming nurse) by Siria Torres RN (offgoing nurse). Report included the following information SBAR, Kardex, Intake/Output, MAR, Recent Results, Med Rec Status and Cardiac Rhythm SB/NSR.     0315: Pt refused lab draws, stating \"we have stuck her enough and her body needs a chance to heal from of her ingram wounds\". 0424: Provider notified. 0500: Pt complained of piercing pain in groin, on assessment of groin site hematoma increased in size. Administered meds for pain, held pressure for 20 min on site, triston labs and obtaining vitals q15 min. 8692Triston Perkins MD.    Huan: Talked with provider, no new orders. Continuing to monitor. 9460: Pt had 7 beats of vtach while I was in room with pt. Pt was asymptomatic. Will pass on to day shift and continue to monitor. 0730: Bedside and Verbal shift change report given to Siria Torres RN (oncoming nurse) by Harry Morales RN (offgoing nurse). Report included the following information SBAR, Kardex, Intake/Output, MAR, Recent Results, Med Rec Status and Cardiac Rhythm SB/NSR.

## 2021-04-06 NOTE — PROGRESS NOTES
Chart reviewed and per OT note: RN reported patient with labile BP and bradycardia at this time. Noted new hematoma found and patient now with 2 groin/R femoral hematomas. PT will defer, follow and see as able and appropriate.  Thank you, Lesia Saleh, PT

## 2021-04-06 NOTE — PROGRESS NOTES
[facility]   [address] [facility], [facility] [address], [facility] [place]  Phone: [phone]   [phone]       Nephrology Progress Note  Annamaria Diaz     [birth year]     [id number]  Date of Admission : 3/29/2021  04/06/21    CC: Follow up for ARF    Assessment and Plan   MORENO on CKD   - Etiology : progressive renovascular disease   - Cr stable  - ok to resume bumex  - daily labs while here     Renal artery disease  - MRA confirmed severe / complete Left RA occlusion   - angio showing  50-70& dz L, 40-50% R    Infra renal AAA  - 3.9 cm   - follow up w/ vascular as out pt     CKD 3  - baseline Cr ~ 1.4-1.5 mg/dl   - renal US: progressive Left > R renal atrophy. Benign cysts   - presumed to be 2/2 DM, HTN      Mild acidosis :  - 2/2 CRF. Watch for now      Pulm edema/ Effusions/ Systolic HF  CAD s/p CABG 2015  - diuresis as above   - Echo EF 25%   - [facility] 4/1: patent grafts     Parkinson Dz      NCNC Anemia   - ? 2/2 CKD   - normal iron, B12, folate   - f/u Myeloma screen      PVD  S/P Left CEA        Interval History:  Seen and examined. Feeling ok. Cr better today. Hgb 7.6. BP meds held last night for hypotension. Now stabilized. No cp, sob, n/v/d reported    Review of Systems: A comprehensive review of systems was negative except for that written in the HPI.     Current Medications:   Current Facility-Administered Medications   Medication Dose Route Frequency    carvediloL (COREG) tablet 6.25 mg  6.25 mg Oral BID WITH MEALS    isosorbide mononitrate ER (IMDUR) tablet 30 mg  30 mg Oral DAILY    zolpidem (AMBIEN) tablet 5 mg  5 mg Oral QHS PRN    0.9% sodium chloride infusion  50 mL/hr IntraVENous CONTINUOUS    hydrALAZINE (APRESOLINE) tablet 50 mg  50 mg Oral TID    meropenem (MERREM) 500 mg in 0.9% sodium chloride (MBP/ADV) 50 mL MBP  0.5 g IntraVENous Q8H    [Held by provider] bumetanide (BUMEX) tablet 2 mg  2 mg Oral DAILY    sodium chloride (NS) flush 5-40 mL  5-40 mL IntraVENous PRN  sodium chloride (NS) flush 5-40 mL  5-40 mL IntraVENous Q8H    sodium chloride (NS) flush 5-40 mL  5-40 mL IntraVENous PRN    acetaminophen (TYLENOL) tablet 650 mg  650 mg Oral Q6H PRN    Or    acetaminophen (TYLENOL) suppository 650 mg  650 mg Rectal Q6H PRN    polyethylene glycol (MIRALAX) packet 17 g  17 g Oral DAILY PRN    promethazine (PHENERGAN) tablet 12.5 mg  12.5 mg Oral Q6H PRN    Or    ondansetron (ZOFRAN) injection 4 mg  4 mg IntraVENous Q6H PRN    aspirin delayed-release tablet 81 mg  81 mg Oral QHS    atorvastatin (LIPITOR) tablet 40 mg  40 mg Oral QHS    carbidopa-levodopa (SINEMET)  mg per tablet 2 Tab  2 Tab Oral QID    clopidogreL (PLAVIX) tablet 75 mg  75 mg Oral DAILY AFTER BREAKFAST    DULoxetine (CYMBALTA) capsule 120 mg  120 mg Oral DAILY    pantoprazole (PROTONIX) tablet 40 mg  40 mg Oral ACB    hydrALAZINE (APRESOLINE) 20 mg/mL injection 20 mg  20 mg IntraVENous Q6H PRN      No Known Allergies    Objective:  Vitals:    Vitals:    04/06/21 0530 04/06/21 0545 04/06/21 0600 04/06/21 0615   BP: 128/63 (!) 169/44 (!) 177/58 (!) 172/54   Pulse: 60 (!) 56 (!) 54 (!) 54   Resp: 17 19 16 16   Temp:       SpO2: 95% 92% 96% 93%   Weight:       Height:         Intake and Output:  04/05 1901 - 04/06 0700  In: 1165 [P.O.:100; I.V.:1065]  Out: 400 [Urine:400]  04/04 0701 - 04/05 1900  In: 2161 [P.O.:1200; I.V.:375]  Out: 1750 [Urine:1750]    Physical Examination:  General:  NAD, resting in bed  HEENT: PERRL,  ++ Pallor , No Icterus  Neck: Supple,no mass palpable  Lungs :CTA b/l  CVS: RRR, S1 S2 normal, No murmur   Abdomen: Soft, NT, BS +  Extremities: no LE edema  Skin: discoloration in both legs .   MS: No joint swelling, erythema, warmth  Neurologic: non focal, AAO x 3    []    High complexity decision making was performed  []    Patient is at high-risk of decompensation with multiple organ involvement    Lab Data Personally Reviewed: I have reviewed all the pertinent labs, microbiology data and radiology studies during assessment. Recent Labs     04/06/21 0538 04/05/21 1922 04/05/21 0353 04/04/21 0341    141 138 137   K 4.0 3.6 3.7 4.0   * 112* 108 109*   CO2 23 23 24 23   GLU 94 135* 117* 109*   BUN 24* 22* 28* 24*   CREA 1.32* 1.44* 1.84* 1.54*   CA 8.6 8.7 8.9 8.7   MG  --  1.7  --   --    PHOS  --   --  3.3  --    ALB  --  2.9* 3.1*  --    ALT  --  <6*  --   --      Recent Labs     04/06/21 0538 04/05/21 1922 04/04/21 0341   WBC 6.2 5.0 4.9   HGB 7.6* 8.4* 9.0*   HCT 25.7* 26.4* 27.9*    195 201     No results found for: SDES  Lab Results   Component Value Date/Time    Culture result: (A) 04/02/2021 04:05 AM     KLEBSIELLA PNEUMONIAE ** (EXTENDED SPECTRUM BETA LACTAMASE ) **    Culture result: MRSA NOT PRESENT 12/26/2018 09:29 PM    Culture result:  12/26/2018 09:29 PM         Screening of patient nares for MRSA is for surveillance purposes and, if positive, to facilitate isolation considerations in high risk settings. It is not intended for automatic decolonization interventions per se as regimens are not sufficiently effective to warrant routine use. Culture result: MRSA PRESENT 02/09/2015 06:30 PM    Culture result:  02/09/2015 06:30 PM         Screening of patient nares for MRSA is for surveillance purposes and, if positive, to facilitate isolation considerations in high risk settings. It is not intended for automatic decolonization interventions per se as regimens are not sufficiently effective to warrant routine use.      Recent Results (from the past 24 hour(s))   POC ACTIVATED CLOTTING TIME    Collection Time: 04/05/21  9:07 AM   Result Value Ref Range    Activated Clotting Time (POC) 158 (H) 79 - 138 SECS   POC ACTIVATED CLOTTING TIME    Collection Time: 04/05/21  9:17 AM   Result Value Ref Range    Activated Clotting Time (POC) 186 (H) 79 - 138 SECS   POC ACTIVATED CLOTTING TIME    Collection Time: 04/05/21  9:34 AM   Result Value Ref Range    Activated Clotting Time (POC) 191 (H) 79 - 138 SECS   CBC WITH AUTOMATED DIFF    Collection Time: 04/05/21  7:22 PM   Result Value Ref Range    WBC 5.0 3.6 - 11.0 K/uL    RBC 2.66 (L) 3.80 - 5.20 M/uL    HGB 8.4 (L) 11.5 - 16.0 g/dL    HCT 26.4 (L) 35.0 - 47.0 %    MCV 99.2 (H) 80.0 - 99.0 FL    MCH 31.6 26.0 - 34.0 PG    MCHC 31.8 30.0 - 36.5 g/dL    RDW 16.1 (H) 11.5 - 14.5 %    PLATELET 717 650 - 565 K/uL    MPV 8.5 (L) 8.9 - 12.9 FL    NRBC 0.0 0  WBC    ABSOLUTE NRBC 0.00 0.00 - 0.01 K/uL    NEUTROPHILS 70 32 - 75 %    LYMPHOCYTES 16 12 - 49 %    MONOCYTES 9 5 - 13 %    EOSINOPHILS 4 0 - 7 %    BASOPHILS 1 0 - 1 %    IMMATURE GRANULOCYTES 0 0.0 - 0.5 %    ABS. NEUTROPHILS 3.5 1.8 - 8.0 K/UL    ABS. LYMPHOCYTES 0.8 0.8 - 3.5 K/UL    ABS. MONOCYTES 0.4 0.0 - 1.0 K/UL    ABS. EOSINOPHILS 0.2 0.0 - 0.4 K/UL    ABS. BASOPHILS 0.0 0.0 - 0.1 K/UL    ABS. IMM. GRANS. 0.0 0.00 - 0.04 K/UL    DF AUTOMATED     MAGNESIUM    Collection Time: 04/05/21  7:22 PM   Result Value Ref Range    Magnesium 1.7 1.6 - 2.4 mg/dL   METABOLIC PANEL, COMPREHENSIVE    Collection Time: 04/05/21  7:22 PM   Result Value Ref Range    Sodium 141 136 - 145 mmol/L    Potassium 3.6 3.5 - 5.1 mmol/L    Chloride 112 (H) 97 - 108 mmol/L    CO2 23 21 - 32 mmol/L    Anion gap 6 5 - 15 mmol/L    Glucose 135 (H) 65 - 100 mg/dL    BUN 22 (H) 6 - 20 MG/DL    Creatinine 1.44 (H) 0.55 - 1.02 MG/DL    BUN/Creatinine ratio 15 12 - 20      GFR est AA 43 (L) >60 ml/min/1.73m2    GFR est non-AA 35 (L) >60 ml/min/1.73m2    Calcium 8.7 8.5 - 10.1 MG/DL    Bilirubin, total 0.6 0.2 - 1.0 MG/DL    ALT (SGPT) <6 (L) 12 - 78 U/L    AST (SGOT) 11 (L) 15 - 37 U/L    Alk.  phosphatase 66 45 - 117 U/L    Protein, total 5.5 (L) 6.4 - 8.2 g/dL    Albumin 2.9 (L) 3.5 - 5.0 g/dL    Globulin 2.6 2.0 - 4.0 g/dL    A-G Ratio 1.1 1.1 - 2.2     PTT    Collection Time: 04/05/21  7:22 PM   Result Value Ref Range    aPTT 25.6 22.1 - 31.0 sec    aPTT, therapeutic range 58.0 - 77.0 SECS   TROPONIN I    Collection Time: 04/05/21  7:22 PM   Result Value Ref Range    Troponin-I, Qt. 0.34 (H) <0.05 ng/mL   CBC W/O DIFF    Collection Time: 04/06/21  5:38 AM   Result Value Ref Range    WBC 6.2 3.6 - 11.0 K/uL    RBC 2.41 (L) 3.80 - 5.20 M/uL    HGB 7.6 (L) 11.5 - 16.0 g/dL    HCT 25.7 (L) 35.0 - 47.0 %    .6 (H) 80.0 - 99.0 FL    MCH 31.5 26.0 - 34.0 PG    MCHC 29.6 (L) 30.0 - 36.5 g/dL    RDW 16.3 (H) 11.5 - 14.5 %    PLATELET 278 682 - 311 K/uL    MPV 8.7 (L) 8.9 - 12.9 FL    NRBC 0.0 0  WBC    ABSOLUTE NRBC 0.00 0.00 - 4.95 K/uL   METABOLIC PANEL, BASIC    Collection Time: 04/06/21  5:38 AM   Result Value Ref Range    Sodium 140 136 - 145 mmol/L    Potassium 4.0 3.5 - 5.1 mmol/L    Chloride 112 (H) 97 - 108 mmol/L    CO2 23 21 - 32 mmol/L    Anion gap 5 5 - 15 mmol/L    Glucose 94 65 - 100 mg/dL    BUN 24 (H) 6 - 20 MG/DL    Creatinine 1.32 (H) 0.55 - 1.02 MG/DL    BUN/Creatinine ratio 18 12 - 20      GFR est AA 47 (L) >60 ml/min/1.73m2    GFR est non-AA 39 (L) >60 ml/min/1.73m2    Calcium 8.6 8.5 - 10.1 MG/DL           Total time spent with patient:  xxx   min. Care Plan discussed with:  Patient     Family      RN      Consulting Physician [facility],         I have reviewed the flowsheets. Chart and Pertinent Notes have been reviewed. No change in PMH ,family and social history from Consult note.       Lula Damon MD

## 2021-04-06 NOTE — PROGRESS NOTES
Cardiology Progress Note            Admit Date: 3/29/2021  Admit Diagnosis: CHF exacerbation ([address] [I50.9]  Date: 4/6/2021     Time: 8:44 AM    Subjective:   No further chest pain. Denies SOB. She reports no dyspnea or chest pain. Overnight right groin hematoma increased. Assessment and Plan     1.. Acute systolic CHF/Cardiomyopathy, possibly ischemic: Echo from April 3rd personally reviewed by me. EF 50-55% when conduction normal and 35-40% with LBBB. -Reduce bumex  To 1  mg daily from tomorrow; hold today   -Hydralazine nitrate and BB will be continued in reduced doses   -May consider CRT in 3 months if EF still low but I suspect may improve   -NO LV THROMBUS    Will keep in the hospital once more day to ensure femoral hematoma stable. Renal function appears stable. 3. NSTEMI/CAD   -No further chest pain   -[facility] 4/21: Patent LIMA to LAD, VG to D1 to OM. S/p PCI/STEPHAN to LAD via LIMA. -% stenosed distal, prox 80%, mid 50%. Medical management.   -Continue ASA, Plavix, high intensity statin     4. PAF: recurrent   -No further PAF on tele review   -Now NSR   -Some bradycardia with low dose coreg- stopped 4/1/21  -Suspect some element of SSS   -[id number] vasc= at least 8.not on [facility] PTA. Anemia of concern. 5. HTN :    -controlled   -On amlodipine, hydralazine, coreg     6. MORENO on CKD    -Creatinine trending down 1.61 today. -Nephrology following- concern for Left BATOOL by MRA. - Bilateral renal angiogram today does not show critical disease in either renal arteries, left renal artery 50-70% and right renal artery close to 40-50% stenosis. No intervention needed. 25 cc contrast used    7. Anemia, normocytic   -Now also has right femoral hematoma. 8. CAD              - s/p CABG x 3 2015,  Stent 2007              - Coreg, Lipitor,, ASA- stop Plavix.    -Nuclear stress 5/5/2020: No ischemia, possile small distal anterior infarct EF 53%    9. MR              - Moderate- severe by repeat echo, likely functional in nature may improve with cardiac resynchronization therapy. 10. PHTN              - Severe by echo, PAS 85 mmHg     11.nfra renal abdominal aortic aneurysm   -4 cm by US   -monitor. Other comorbids:  13. Carotid dz              - s/p CEA on left              - Plavix, Lipitor. 14. HLD:   - Lipitor   15. DM              - A1C 5.7       16. H/o CVA              - left lacunar infarct at head of caudate     Summary:   79 y.o. female with PMH of CAD, now with new LV dysfunction, and NSTEMI who undewent PCI/STEPHAN to LAD yesterday. Plavix based DAPT planned. Medical management of RCA disease. Renal function improving. Renal angiogram does not shows hih grade stenosis requiring intervention. No LV thrombus on repeat echo and EF is better. Once hematoma in the groin is stable, can be discharged. POssibly tomorrow. BP is labile, will modeifiy drugs today, reduce hydralazine and Coreg . Continue Imdur. Hold Bumex today and resume at 1 mg tomorrow. Jennie Rangel MD 2021 1000 AM      Cardiac testin21   ECHO ADULT COMPLETE 2021 3/31/2021    Narrative · LV: Estimated LVEF is 25 - 30%. Normal wall thickness. Mildly dilated   left ventricle. Severely reduced systolic function. Abnormal left   ventricular septal motion consistent with left bundle branch block. Small   possible thrombus present in the left ventricle. Thrombus is located in   the apex. · MV: Severe mitral annular calcification. Moderate to severe mitral valve   regurgitation is present. · LA: Severely dilated left atrium. · TV: Moderate to severe tricuspid valve regurgitation is present. · PA: Severe pulmonary hypertension. Pulmonary arterial systolic pressure   is 85 mmHg. · RA: Mildly dilated right atrium. · AV: Mild to moderate aortic valve regurgitation is present. Signed by:  Mar Charlton MD     21   CARDIAC PROCEDURE 04/01/2021 4/1/2021    Narrative Findings:  1)Severe antive 3 vessel CAD  2)Patetn LIMA to LAD, VG to d1 to OM  3. Occluded native RCA with rPDA collateralized from left system. All   arteries are heavily calcified  4. Native distal LAD with 90% stenosis  5. S/p PCI of native distal LAS with 2.25 by 18 mm Xinece STEPHAN through LIMA   graft  6. Normal LVEDP    Access  Left radial: Severe calcification of subclavian. Tortuous    Contrast 41 cc    Recommendations  1)Plavix based DPAT. 2. Interval renal angiogram in On Monday if she is still here--otherwise   as outpatient. Not done to day to conserve contrast.  3. GDMT for CAD  4. Medical mgm for RCA disease.      Signed by: Danni Santana MD                  [facility]  Past Medical History:   Diagnosis Date    Anxiety disorder     Atrial fibrillation ([facility] [address]     CAD (coronary artery disease) 2007    stents, CABG x 3v    Carotid stenosis     Cervical stenosis of spinal canal 07/2019    Chronic kidney disease     Cough     CVA (cerebral vascular accident) ([address] 07/2019    left lacunar infarct at head of caudate    Depression     AND CHRONIC ANXIETY    Diabetes ([address]     GERD (gastroesophageal reflux disease)     High cholesterol     History of peptic ulcer     Bleeding ulcer with increased NSAID use    Hypertension     Left carotid stenosis 07/2019    s/p left CEA with Dr. Pruitt Adjutant, old 2007    PUD (peptic ulcer disease)     Stroke ([facility] [address]     Tremor     Valvular heart disease       Social Hx  Social History     Socioeconomic History    Marital status: SINGLE     Spouse name: Not on file    Number of children: Not on file    Years of education: Not on file    Highest education level: Not on file   Occupational History    Occupation: Retired realestate/teacher   Social Needs    Financial resource strain: Not on file    Food insecurity     Worry: Not on file     Inability: Not on file   [employer] needs     Medical: Not on file Non-medical: Not on file   Tobacco Use    Smoking status: Former Smoker     Packs/day: 0.25     Years: 5.00     Pack years: 1.25     Types: Cigarettes     Quit date: 1969     Years since quittin.2    Smokeless tobacco: Never Used   Substance and Sexual Activity    Alcohol use: Yes     Alcohol/week: 0.0 standard drinks     Comment: Rare    Drug use: No    Sexual activity: Not on file   Lifestyle    Physical activity     Days per week: Not on file     Minutes per session: Not on file    Stress: Not on file   Relationships    Social connections     Talks on phone: Not on file     Gets together: Not on file     Attends Synagogue service: Not on file     Active member of club or organization: Not on file     Attends meetings of clubs or organizations: Not on file     Relationship status: Not on file    Intimate partner violence     Fear of current or ex partner: Not on file     Emotionally abused: Not on file     Physically abused: Not on file     Forced sexual activity: Not on file   Other Topics Concern    Not on file   Social History Narrative    Lives in [facility]       Objective:   Physical Exam:                Visit Vitals  BP (!) 165/49   Pulse 62   Temp 98.2 °F (36.8 °C)   Resp 17   Ht 5' 5\" (1.651 m)   Wt 151 lb 7.3 oz (68.7 kg)   SpO2 96%   BMI 25.20 kg/m²          General Appearance:   Well developed, pale, alert and oriented x 3, and   individual in no acute distress. Ears/Nose/Mouth/Throat:    Hearing grossly normal.         Neck:  Supple. Chest:    Lungs with left basilar crackles,   Cardiovascular:  Regular rate and rhythm, S1, S2 normal, no murmur. Abdomen:    Soft, non-tender, bowel sounds are active. Extremities:  no edema bilaterally. Skin:  Warm and dry. Pale. Telemetry: NSR with BBB currently.  No PAF            Data Review:    Labs:    Recent Results (from the past 24 hour(s))   POC ACTIVATED CLOTTING TIME    Collection Time: 21  9:07 AM   Result Value Ref Range    Activated Clotting Time (POC) 158 (H) 79 - 138 SECS   POC ACTIVATED CLOTTING TIME    Collection Time: 04/05/21  9:17 AM   Result Value Ref Range    Activated Clotting Time (POC) 186 (H) 79 - 138 SECS   POC ACTIVATED CLOTTING TIME    Collection Time: 04/05/21  9:34 AM   Result Value Ref Range    Activated Clotting Time (POC) 191 (H) 79 - 138 SECS   CBC WITH AUTOMATED DIFF    Collection Time: 04/05/21  7:22 PM   Result Value Ref Range    WBC 5.0 3.6 - 11.0 K/uL    RBC 2.66 (L) 3.80 - 5.20 M/uL    HGB 8.4 (L) 11.5 - 16.0 g/dL    HCT 26.4 (L) 35.0 - 47.0 %    MCV 99.2 (H) 80.0 - 99.0 FL    MCH 31.6 26.0 - 34.0 PG    MCHC 31.8 30.0 - 36.5 g/dL    RDW 16.1 (H) 11.5 - 14.5 %    PLATELET 376 210 - 002 K/uL    MPV 8.5 (L) 8.9 - 12.9 FL    NRBC 0.0 0  WBC    ABSOLUTE NRBC 0.00 0.00 - 0.01 K/uL    NEUTROPHILS 70 32 - 75 %    LYMPHOCYTES 16 12 - 49 %    MONOCYTES 9 5 - 13 %    EOSINOPHILS 4 0 - 7 %    BASOPHILS 1 0 - 1 %    IMMATURE GRANULOCYTES 0 0.0 - 0.5 %    ABS. NEUTROPHILS 3.5 1.8 - 8.0 K/UL    ABS. LYMPHOCYTES 0.8 0.8 - 3.5 K/UL    ABS. MONOCYTES 0.4 0.0 - 1.0 K/UL    ABS. EOSINOPHILS 0.2 0.0 - 0.4 K/UL    ABS. BASOPHILS 0.0 0.0 - 0.1 K/UL    ABS. IMM.  GRANS. 0.0 0.00 - 0.04 K/UL    DF AUTOMATED     MAGNESIUM    Collection Time: 04/05/21  7:22 PM   Result Value Ref Range    Magnesium 1.7 1.6 - 2.4 mg/dL   METABOLIC PANEL, COMPREHENSIVE    Collection Time: 04/05/21  7:22 PM   Result Value Ref Range    Sodium 141 136 - 145 mmol/L    Potassium 3.6 3.5 - 5.1 mmol/L    Chloride 112 (H) 97 - 108 mmol/L    CO2 23 21 - 32 mmol/L    Anion gap 6 5 - 15 mmol/L    Glucose 135 (H) 65 - 100 mg/dL    BUN 22 (H) 6 - 20 MG/DL    Creatinine 1.44 (H) 0.55 - 1.02 MG/DL    BUN/Creatinine ratio 15 12 - 20      GFR est AA 43 (L) >60 ml/min/1.73m2    GFR est non-AA 35 (L) >60 ml/min/1.73m2    Calcium 8.7 8.5 - 10.1 MG/DL    Bilirubin, total 0.6 0.2 - 1.0 MG/DL    ALT (SGPT) <6 (L) 12 - 78 U/L    AST (SGOT) 11 (L) 15 - 37 U/L Alk. phosphatase 66 45 - 117 U/L    Protein, total 5.5 (L) 6.4 - 8.2 g/dL    Albumin 2.9 (L) 3.5 - 5.0 g/dL    Globulin 2.6 2.0 - 4.0 g/dL    A-G Ratio 1.1 1.1 - 2.2     PTT    Collection Time: 04/05/21  7:22 PM   Result Value Ref Range    aPTT 25.6 22.1 - 31.0 sec    aPTT, therapeutic range     58.0 - 77.0 SECS   TROPONIN I    Collection Time: 04/05/21  7:22 PM   Result Value Ref Range    Troponin-I, Qt. 0.34 (H) <0.05 ng/mL   CBC W/O DIFF    Collection Time: 04/06/21  5:38 AM   Result Value Ref Range    WBC 6.2 3.6 - 11.0 K/uL    RBC 2.41 (L) 3.80 - 5.20 M/uL    HGB 7.6 (L) 11.5 - 16.0 g/dL    HCT 25.7 (L) 35.0 - 47.0 %    .6 (H) 80.0 - 99.0 FL    MCH 31.5 26.0 - 34.0 PG    MCHC 29.6 (L) 30.0 - 36.5 g/dL    RDW 16.3 (H) 11.5 - 14.5 %    PLATELET 043 738 - 585 K/uL    MPV 8.7 (L) 8.9 - 12.9 FL    NRBC 0.0 0  WBC    ABSOLUTE NRBC 0.00 0.00 - 9.57 K/uL   METABOLIC PANEL, BASIC    Collection Time: 04/06/21  5:38 AM   Result Value Ref Range    Sodium 140 136 - 145 mmol/L    Potassium 4.0 3.5 - 5.1 mmol/L    Chloride 112 (H) 97 - 108 mmol/L    CO2 23 21 - 32 mmol/L    Anion gap 5 5 - 15 mmol/L    Glucose 94 65 - 100 mg/dL    BUN 24 (H) 6 - 20 MG/DL    Creatinine 1.32 (H) 0.55 - 1.02 MG/DL    BUN/Creatinine ratio 18 12 - 20      GFR est AA 47 (L) >60 ml/min/1.73m2    GFR est non-AA 39 (L) >60 ml/min/1.73m2    Calcium 8.6 8.5 - 10.1 MG/DL          Radiology:        Current Facility-Administered Medications   Medication Dose Route Frequency    carvediloL (COREG) tablet 3.125 mg  3.125 mg Oral BID WITH MEALS    isosorbide mononitrate ER (IMDUR) tablet 30 mg  30 mg Oral DAILY    zolpidem (AMBIEN) tablet 5 mg  5 mg Oral QHS PRN    hydrALAZINE (APRESOLINE) tablet 50 mg  50 mg Oral TID    meropenem (MERREM) 500 mg in 0.9% sodium chloride (MBP/ADV) 50 mL MBP  0.5 g IntraVENous Q8H    bumetanide (BUMEX) tablet 2 mg  2 mg Oral DAILY    sodium chloride (NS) flush 5-40 mL  5-40 mL IntraVENous PRN    sodium chloride (NS) flush 5-40 mL  5-40 mL IntraVENous Q8H    sodium chloride (NS) flush 5-40 mL  5-40 mL IntraVENous PRN    acetaminophen (TYLENOL) tablet 650 mg  650 mg Oral Q6H PRN    Or    acetaminophen (TYLENOL) suppository 650 mg  650 mg Rectal Q6H PRN    polyethylene glycol (MIRALAX) packet 17 g  17 g Oral DAILY PRN    promethazine (PHENERGAN) tablet 12.5 mg  12.5 mg Oral Q6H PRN    Or    ondansetron (ZOFRAN) injection 4 mg  4 mg IntraVENous Q6H PRN    aspirin delayed-release tablet 81 mg  81 mg Oral QHS    atorvastatin (LIPITOR) tablet 40 mg  40 mg Oral QHS    carbidopa-levodopa (SINEMET)  mg per tablet 2 Tab  2 Tab Oral QID    clopidogreL (PLAVIX) tablet 75 mg  75 mg Oral DAILY AFTER BREAKFAST    DULoxetine (CYMBALTA) capsule 120 mg  120 mg Oral DAILY    pantoprazole (PROTONIX) tablet 40 mg  40 mg Oral ACB    hydrALAZINE (APRESOLINE) 20 mg/mL injection 20 mg  20 mg IntraVENous Q6H PRN          Williams Andrade MD     I concur with the above note, findings and assessment. She seems to be feeling slightly better in terms of her breathing and does not report any further chest discomfort. BP (!) 165/49   Pulse 62   Temp 98.2 °F (36.8 °C)   Resp 17   Ht 5' 5\" (1.651 m)   Wt 151 lb 7.3 oz (68.7 kg)   SpO2 96%   BMI 25.20 kg/m²   General:    Alert, cooperative, no distress. Psychiatric:    Normal Mood and affect    Eye/ENT:      Pupils equal, No asymmetry, Conjunctival pink. Able to hear voice at normal amplitude   Lungs:      Visibly symmetric chest expansion, No palpable tenderness. Basal crackls    Heart[de-identified]    Regular rate and rhythm, S1, S2 normal, no murmur, click, rub or gallop. No JVD, Normal palpable peripheral pulses. No cyanosis   Abdomen:     Soft, non-tender. Bowel sounds normal. No masses,  No      organomegaly. Extremities:   Extremities normal, atraumatic, no edema. Neurologic:   CN II-XII grossly intact.  No gross focal deficits       Patient was seen with NP/PA, recent investigations were reviewed and treatment care/plan was formulated together. There was a concern on echocardiogram regarding possible LV thrombus. We will perform definitive study with with echocardiogram.  She will require more assistance than she currently has at home. May require anesthesia care during her discharge. If she is here through the weekend, will perform renal angiogram on Monday. She will definitively require diuretic therapy given dilated IVC on her echocardiogram and persistent clinical evidence of congestion. Particularly given underlying functional mitral and tricuspid regurgitation, lack of diuretics on board will put her at high risk of repeat. Reviewed her echocardiogram demonstrates significant dyssynchrony of the septum due to underlying left bundle branch block.   She may benefit from cardiac resynchronization therapy long-term but given recent PCI will have to wait for 3 months before she can receive CRT  Aisha Welch MD   4/6/2021  11:43 AM             [facility] [address] [place] [address],[address]   [place]   [phone]

## 2021-04-06 NOTE — PROGRESS NOTES
Chart reviewed. RN reported patient with labile BP and bradycardia at this time. Noted new hematoma found and patient now with 2 groin/R femoral hematomas. Will follow up for OT intervention as able. Anticipate need for SNF rehab post discharge.

## 2021-04-07 LAB
ALBUMIN SERPL-MCNC: 2.8 G/DL (ref 3.5–5)
ANION GAP SERPL CALC-SCNC: 5 MMOL/L (ref 5–15)
BASOPHILS # BLD: 0.1 K/UL (ref 0–0.1)
BASOPHILS NFR BLD: 1 % (ref 0–1)
BUN SERPL-MCNC: 25 MG/DL (ref 6–20)
BUN/CREAT SERPL: 19 (ref 12–20)
CALCIUM SERPL-MCNC: 8.8 MG/DL (ref 8.5–10.1)
CHLORIDE SERPL-SCNC: 109 MMOL/L (ref 97–108)
CO2 SERPL-SCNC: 24 MMOL/L (ref 21–32)
CREAT SERPL-MCNC: 1.31 MG/DL (ref 0.55–1.02)
DIFFERENTIAL METHOD BLD: ABNORMAL
EOSINOPHIL # BLD: 0.3 K/UL (ref 0–0.4)
EOSINOPHIL NFR BLD: 5 % (ref 0–7)
ERYTHROCYTE [DISTWIDTH] IN BLOOD BY AUTOMATED COUNT: 16.1 % (ref 11.5–14.5)
ERYTHROCYTE [DISTWIDTH] IN BLOOD BY AUTOMATED COUNT: 18.4 % (ref 11.5–14.5)
GLUCOSE SERPL-MCNC: 96 MG/DL (ref 65–100)
HCT VFR BLD AUTO: 21.7 % (ref 35–47)
HCT VFR BLD AUTO: 25.8 % (ref 35–47)
HGB BLD-MCNC: 6.8 G/DL (ref 11.5–16)
HGB BLD-MCNC: 8.2 G/DL (ref 11.5–16)
IMM GRANULOCYTES # BLD AUTO: 0 K/UL (ref 0–0.04)
IMM GRANULOCYTES NFR BLD AUTO: 0 % (ref 0–0.5)
LYMPHOCYTES # BLD: 1 K/UL (ref 0.8–3.5)
LYMPHOCYTES NFR BLD: 16 % (ref 12–49)
MCH RBC QN AUTO: 30.1 PG (ref 26–34)
MCH RBC QN AUTO: 30.9 PG (ref 26–34)
MCHC RBC AUTO-ENTMCNC: 31.3 G/DL (ref 30–36.5)
MCHC RBC AUTO-ENTMCNC: 31.8 G/DL (ref 30–36.5)
MCV RBC AUTO: 94.9 FL (ref 80–99)
MCV RBC AUTO: 98.6 FL (ref 80–99)
MONOCYTES # BLD: 0.7 K/UL (ref 0–1)
MONOCYTES NFR BLD: 11 % (ref 5–13)
NEUTS SEG # BLD: 4 K/UL (ref 1.8–8)
NEUTS SEG NFR BLD: 67 % (ref 32–75)
NRBC # BLD: 0 K/UL (ref 0–0.01)
NRBC # BLD: 0 K/UL (ref 0–0.01)
NRBC BLD-RTO: 0 PER 100 WBC
NRBC BLD-RTO: 0 PER 100 WBC
PHOSPHATE SERPL-MCNC: 3.3 MG/DL (ref 2.6–4.7)
PLATELET # BLD AUTO: 184 K/UL (ref 150–400)
PLATELET # BLD AUTO: 185 K/UL (ref 150–400)
PMV BLD AUTO: 8.7 FL (ref 8.9–12.9)
PMV BLD AUTO: 8.8 FL (ref 8.9–12.9)
POTASSIUM SERPL-SCNC: 4.3 MMOL/L (ref 3.5–5.1)
RBC # BLD AUTO: 2.2 M/UL (ref 3.8–5.2)
RBC # BLD AUTO: 2.72 M/UL (ref 3.8–5.2)
RBC MORPH BLD: ABNORMAL
SODIUM SERPL-SCNC: 138 MMOL/L (ref 136–145)
WBC # BLD AUTO: 6.1 K/UL (ref 3.6–11)
WBC # BLD AUTO: 6.5 K/UL (ref 3.6–11)

## 2021-04-07 PROCEDURE — 74011250637 HC RX REV CODE- 250/637: Performed by: FAMILY MEDICINE

## 2021-04-07 PROCEDURE — 99233 SBSQ HOSP IP/OBS HIGH 50: CPT | Performed by: INTERNAL MEDICINE

## 2021-04-07 PROCEDURE — 80069 RENAL FUNCTION PANEL: CPT

## 2021-04-07 PROCEDURE — 74011250636 HC RX REV CODE- 250/636: Performed by: FAMILY MEDICINE

## 2021-04-07 PROCEDURE — P9016 RBC LEUKOCYTES REDUCED: HCPCS

## 2021-04-07 PROCEDURE — 74011250637 HC RX REV CODE- 250/637: Performed by: NURSE PRACTITIONER

## 2021-04-07 PROCEDURE — 65270000029 HC RM PRIVATE

## 2021-04-07 PROCEDURE — 36415 COLL VENOUS BLD VENIPUNCTURE: CPT

## 2021-04-07 PROCEDURE — 94760 N-INVAS EAR/PLS OXIMETRY 1: CPT

## 2021-04-07 PROCEDURE — 65660000000 HC RM CCU STEPDOWN

## 2021-04-07 PROCEDURE — 74011250637 HC RX REV CODE- 250/637: Performed by: INTERNAL MEDICINE

## 2021-04-07 PROCEDURE — 85025 COMPLETE CBC W/AUTO DIFF WBC: CPT

## 2021-04-07 PROCEDURE — 36430 TRANSFUSION BLD/BLD COMPNT: CPT

## 2021-04-07 PROCEDURE — 74011000258 HC RX REV CODE- 258: Performed by: FAMILY MEDICINE

## 2021-04-07 RX ORDER — TRAMADOL HYDROCHLORIDE 50 MG/1
50 TABLET ORAL
Status: DISCONTINUED | OUTPATIENT
Start: 2021-04-07 | End: 2021-04-09 | Stop reason: HOSPADM

## 2021-04-07 RX ADMIN — MEROPENEM 500 MG: 500 INJECTION, POWDER, FOR SOLUTION INTRAVENOUS at 03:36

## 2021-04-07 RX ADMIN — CARBIDOPA AND LEVODOPA 2 TABLET: 25; 100 TABLET ORAL at 21:30

## 2021-04-07 RX ADMIN — BUMETANIDE 1 MG: 1 TABLET ORAL at 09:17

## 2021-04-07 RX ADMIN — DULOXETINE HYDROCHLORIDE 120 MG: 60 CAPSULE, DELAYED RELEASE ORAL at 09:16

## 2021-04-07 RX ADMIN — ISOSORBIDE MONONITRATE 30 MG: 30 TABLET, EXTENDED RELEASE ORAL at 09:17

## 2021-04-07 RX ADMIN — POLYETHYLENE GLYCOL 3350 17 G: 17 POWDER, FOR SOLUTION ORAL at 09:18

## 2021-04-07 RX ADMIN — Medication 10 ML: at 09:18

## 2021-04-07 RX ADMIN — ACETAMINOPHEN 650 MG: 325 TABLET ORAL at 15:30

## 2021-04-07 RX ADMIN — CLOPIDOGREL BISULFATE 75 MG: 75 TABLET ORAL at 09:17

## 2021-04-07 RX ADMIN — CARBIDOPA AND LEVODOPA 2 TABLET: 25; 100 TABLET ORAL at 09:17

## 2021-04-07 RX ADMIN — ATORVASTATIN CALCIUM 40 MG: 40 TABLET, FILM COATED ORAL at 21:30

## 2021-04-07 RX ADMIN — Medication 10 ML: at 21:31

## 2021-04-07 RX ADMIN — Medication 10 ML: at 06:15

## 2021-04-07 RX ADMIN — MEROPENEM 500 MG: 500 INJECTION, POWDER, FOR SOLUTION INTRAVENOUS at 18:15

## 2021-04-07 RX ADMIN — HYDRALAZINE HYDROCHLORIDE 25 MG: 25 TABLET, FILM COATED ORAL at 09:17

## 2021-04-07 RX ADMIN — TRAMADOL HYDROCHLORIDE 50 MG: 50 TABLET, COATED ORAL at 15:57

## 2021-04-07 RX ADMIN — CARBIDOPA AND LEVODOPA 2 TABLET: 25; 100 TABLET ORAL at 17:41

## 2021-04-07 RX ADMIN — CARBIDOPA AND LEVODOPA 2 TABLET: 25; 100 TABLET ORAL at 12:41

## 2021-04-07 RX ADMIN — MEROPENEM 500 MG: 500 INJECTION, POWDER, FOR SOLUTION INTRAVENOUS at 12:42

## 2021-04-07 RX ADMIN — ASPIRIN 81 MG: 81 TABLET, COATED ORAL at 21:30

## 2021-04-07 RX ADMIN — PANTOPRAZOLE SODIUM 40 MG: 40 TABLET, DELAYED RELEASE ORAL at 06:15

## 2021-04-07 RX ADMIN — CARVEDILOL 3.12 MG: 3.12 TABLET, FILM COATED ORAL at 09:17

## 2021-04-07 NOTE — PROGRESS NOTES
Physical Therapy - defer  Chart reviewed, RN cleared for activity. Report received from OT who notes patient declined OT session (@14:30). Met with patient in an attempt to schedule a time for therapy session llater this afternoon. Pt politely refused. States she continues to have pain and \"I just don't feel up to therapy today\". Pt endorses sitting up in the chair earlier today, has not walked beyond bed to chair. Will check back tomorrow for weekly re-assessment.

## 2021-04-07 NOTE — ROUTINE PROCESS
TRANSFER - IN REPORT: 
 
Verbal report received from Tomeka Velez RN(name) on [employer]  being received from [facility](unit) for routine progression of care Report consisted of patients Situation, Background, Assessment and  
Recommendations(SBAR). Information from the following report(s) SBAR was reviewed with the receiving nurse. Opportunity for questions and clarification was provided. Assessment completed upon patients arrival to unit and care assumed.

## 2021-04-07 NOTE — CARDIO/PULMONARY
Cardiac Rehab: Living with Heart Failure Booklet given to Yady Nagy at a previous visit. Visited to reinforce HF education. Educated using teach back method. Discussed diagnosis definition and assessed patient understanding. Reviewed importance of daily weight monitoring and Low Sodium diet (6857-6444 mg. Daily), as well as medication compliance and communication with cardiologist when having signs and symptoms of fluid overload. Encouraged activity and rest periods within symptom limitations and as ordered by physician. Yady Nagy does not check her weight daily so strongly encouraged daily checks to monitor for weight gain. At this time due to debility and safety concerns will not enroll in OP Cardiac Rehab.  
  
Thomas Sun RN

## 2021-04-07 NOTE — PROGRESS NOTES
Cardiology Progress Note            Admit Date: 3/29/2021  Admit Diagnosis: CHF exacerbation ([address] [I50.9]  Date: 4/7/2021     Time: 8:44 AM    Subjective:   No further chest pain. Denies SOB. She reports no dyspnea or chest pain. Overnight right groin hematoma concerns requiring FemoStop placement. Patient most likely developed a vasovagal response secondarily causing hypotension with rapid response. This promptly recovered with IV fluids and albumin within 15 to 20 minutes. Assessment and Plan     1.. Acute systolic CHF/Cardiomyopathy, possibly ischemic: Echo from April 3rd personally reviewed by me. EF 50-55% when conduction normal and 35-40% with LBBB. -Reduce bumex  To 1  mg daily from tomorrow; hold today   -Hydralazine nitrate and BB will be continued in reduced doses   -May consider CRT in 3 months if EF still low but I suspect may improve   -NO LV THROMBUS    I do not feel that femoral hematoma is increasing in size. Required blood transfusion yesterday due to low hemoglobin. I do not feel that she is bleeding anymore. Will try to ambulate the patient later today. May be discharged from cardiac standpoint by tomorrow most likely will require either assistance at home or discharged to a subacute rehab for strengthening for the next couple of weeks. Current medical regimen appears to be adequate. She has fairly labile blood pressures but continuation of current low-dose regimen for heart failure with ejection fraction is reasonable. This includes Imdur, hydralazine, low-dose beta-blocker. 3. NSTEMI/CAD   -No further chest pain   -[facility] 4/21: Patent LIMA to LAD, VG to D1 to OM. S/p PCI/STEPHAN to LAD via LIMA. -% stenosed distal, prox 80%, mid 50%. Medical management.   -Continue ASA, Plavix, high intensity statin     4.  PAF: recurrent   -No further PAF on tele review   -Now NSR   -Some bradycardia with low dose coreg- stopped 21  -Suspect some element of SSS   -CCP0CQ3 vasc= at least 8.not on [address] PTA. Anemia of concern. 5. HTN :    -controlled   -On amlodipine, hydralazine, coreg     6. MORENO on CKD    -Creatinine trending down 1.61 today. -Nephrology following- concern for Left BATOOL by MRA. - Bilateral renal angiogram today does not show critical disease in either renal arteries, left renal artery 50-70% and right renal artery close to 40-50% stenosis    7. Anemia, normocytic   -Now also has right femoral hematoma. 8. CAD              - s/p CABG x 3 ,  Stent               - Coreg, Lipitor,, ASA- stop Plavix. -Nuclear stress 2020: No ischemia, possile small distal anterior infarct EF 53%    9. MR              - Moderate- severe by repeat echo, likely functional in nature may improve with cardiac resynchronization therapy. 10. PHTN              - Severe by echo, PAS 85 mmHg     11.nfra renal abdominal aortic aneurysm   -4 cm by US   -monitor. Other comorbids:  13. Carotid dz              - s/p CEA on left              - Plavix, Lipitor. 14. HLD:   - Lipitor   15. DM              - A1C 5.7       16. H/o CVA              - left lacunar infarct at head of caudate     Summary:   79 y.o. female with PMH of CAD, now with new LV dysfunction, and NSTEMI who undewent PCI/STEPHAN to LAD yesterday. Plavix based DAPT planned. Medical management of RCA disease. Renal function improving. Renal angiogram does not shows hih grade stenosis requiring intervention. No LV thrombus on repeat echo and EF is better. Once hematoma in the groin is stable, can be discharged. POssibly tomorrow. BP is labile, will modeifiy drugs today, reduce hydralazine and Coreg . Continue Imdur. Hold Bumex today and resume at 1 mg tomorrow. Gustavo Lynn MD 2021 1000 AM      Cardiac testin21   ECHO ADULT COMPLETE 2021 3/31/2021    Narrative · LV: Estimated LVEF is 25 - 30%. Normal wall thickness.  Mildly dilated   left ventricle. Severely reduced systolic function. Abnormal left   ventricular septal motion consistent with left bundle branch block. Small   possible thrombus present in the left ventricle. Thrombus is located in   the apex. · MV: Severe mitral annular calcification. Moderate to severe mitral valve   regurgitation is present. · LA: Severely dilated left atrium. · TV: Moderate to severe tricuspid valve regurgitation is present. · PA: Severe pulmonary hypertension. Pulmonary arterial systolic pressure   is 85 mmHg. · RA: Mildly dilated right atrium. · AV: Mild to moderate aortic valve regurgitation is present. Signed by: Lj Barrett MD     03/29/21   CARDIAC PROCEDURE 04/01/2021 4/1/2021    Narrative Findings:  1)Severe antive 3 vessel CAD  2)Patetn LIMA to LAD, VG to d1 to OM  3. Occluded native RCA with rPDA collateralized from left system. All   arteries are heavily calcified  4. Native distal LAD with 90% stenosis  5. S/p PCI of native distal LAS with 2.25 by 18 mm Xinece STEPHAN through LIMA   graft  6. Normal LVEDP    Access  Left radial: Severe calcification of subclavian. Tortuous    Contrast 41 cc    Recommendations  1)Plavix based DPAT. 2. Interval renal angiogram in On Monday if she is still here--otherwise   as outpatient. Not done to day to conserve contrast.  3. GDMT for CAD  4. Medical mgm for RCA disease.      Signed by: Carissa Larson MD                  [facility]  Past Medical History:   Diagnosis Date    Anxiety disorder     Atrial fibrillation ([address]     CAD (coronary artery disease) 2007    stents, CABG x 3v    Carotid stenosis     Cervical stenosis of spinal canal 07/2019    Chronic kidney disease     Cough     CVA (cerebral vascular accident) ([address] 07/2019    left lacunar infarct at head of caudate    Depression     AND CHRONIC ANXIETY    Diabetes ([address]     GERD (gastroesophageal reflux disease)     High cholesterol     History of peptic ulcer     Bleeding ulcer with increased NSAID use    Hypertension     Left carotid stenosis 2019    s/p left CEA with Dr. Bharath Pappas, old     PUD (peptic ulcer disease)     [facility]     Tremor     Valvular heart disease       Social Hx  Social History     Socioeconomic History    Marital status: SINGLE     Spouse name: Not on file    Number of children: Not on file    Years of education: Not on file    Highest education level: Not on file   Occupational History    Occupation: Retired realestate/teacher   Social Needs    Financial resource strain: Not on file    Food insecurity     Worry: Not on file     Inability: Not on file   [employer] needs     Medical: Not on file     Non-medical: Not on file   Tobacco Use    Smoking status: Former Smoker     Packs/day: 0.25     Years: 5.00     Pack years: 1.25     Types: Cigarettes     Quit date:      Years since quittin.2    Smokeless tobacco: Never Used   Substance and Sexual Activity    Alcohol use:  Yes     Alcohol/week: 0.0 standard drinks     Comment: Rare    Drug use: No    Sexual activity: Not on file   Lifestyle    Physical activity     Days per week: Not on file     Minutes per session: Not on file    Stress: Not on file   Relationships    Social connections     Talks on phone: Not on file     Gets together: Not on file     Attends Voodoo service: Not on file     Active member of club or organization: Not on file     Attends meetings of clubs or organizations: Not on file     Relationship status: Not on file    Intimate partner violence     Fear of current or ex partner: Not on file     Emotionally abused: Not on file     Physically abused: Not on file     Forced sexual activity: Not on file   Other Topics Concern    Not on file   Social History Narrative    L[facility] in [facility]       Objective:   Physical Exam:                Visit Vitals  BP (!) 112/45   Pulse (!) 58   Temp 98.1 °F (36.7 °C)   Resp 19   Ht 5' 5\" (1.651 m)   Wt 158 lb 4.8 oz (71.8 kg)   SpO2 94%   BMI 26.34 kg/m²          General Appearance:   Well developed, pale, alert and oriented x 3, and   individual in no acute distress. Ears/Nose/Mouth/Throat:    Hearing grossly normal.         Neck:  Supple. Chest:    Lungs with left basilar crackles,   Cardiovascular:  Regular rate and rhythm, S1, S2 normal, no murmur. Abdomen:    Soft, non-tender, bowel sounds are active. Extremities:  no edema bilaterally. Skin:  Warm and dry. Pale. Telemetry: NSR with BBB currently. No PAF            Data Review:    Labs:    Recent Results (from the past 24 hour(s))   METABOLIC PANEL, COMPREHENSIVE    Collection Time: 04/06/21  6:57 PM   Result Value Ref Range    Sodium 140 136 - 145 mmol/L    Potassium 4.0 3.5 - 5.1 mmol/L    Chloride 112 (H) 97 - 108 mmol/L    CO2 23 21 - 32 mmol/L    Anion gap 5 5 - 15 mmol/L    Glucose 128 (H) 65 - 100 mg/dL    BUN 24 (H) 6 - 20 MG/DL    Creatinine 1.22 (H) 0.55 - 1.02 MG/DL    BUN/Creatinine ratio 20 12 - 20      GFR est AA 52 (L) >60 ml/min/1.73m2    GFR est non-AA 43 (L) >60 ml/min/1.73m2    Calcium 8.3 (L) 8.5 - 10.1 MG/DL    Bilirubin, total 0.3 0.2 - 1.0 MG/DL    ALT (SGPT) 8 (L) 12 - 78 U/L    AST (SGOT) 12 (L) 15 - 37 U/L    Alk.  phosphatase 58 45 - 117 U/L    Protein, total 4.9 (L) 6.4 - 8.2 g/dL    Albumin 2.5 (L) 3.5 - 5.0 g/dL    Globulin 2.4 2.0 - 4.0 g/dL    A-G Ratio 1.0 (L) 1.1 - 2.2     MAGNESIUM    Collection Time: 04/06/21  6:57 PM   Result Value Ref Range    Magnesium 1.9 1.6 - 2.4 mg/dL   CBC W/O DIFF    Collection Time: 04/06/21  6:57 PM   Result Value Ref Range    WBC 5.7 3.6 - 11.0 K/uL    RBC 2.02 (L) 3.80 - 5.20 M/uL    HGB 6.5 (L) 11.5 - 16.0 g/dL    HCT 20.5 (L) 35.0 - 47.0 %    .5 (H) 80.0 - 99.0 FL    MCH 32.2 26.0 - 34.0 PG    MCHC 31.7 30.0 - 36.5 g/dL    RDW 16.2 (H) 11.5 - 14.5 %    PLATELET 224 242 - 461 K/uL    MPV 8.7 (L) 8.9 - 12.9 FL    NRBC 0.0 0  WBC    ABSOLUTE NRBC 0.00 0.00 - 0.01 K/uL TYPE & SCREEN    Collection Time: 04/06/21  6:57 PM   Result Value Ref Range    Crossmatch Expiration 04/09/2021,2359     ABO/Rh(D) B POSITIVE     Antibody screen NEG     Unit number [id number]     Blood component type RC LR     Unit division 00     Status of unit ISSUED     Crossmatch result Compatible    RBC, ALLOCATE    Collection Time: 04/06/21 11:15 PM   Result Value Ref Range    HISTORY CHECKED?  Historical check performed    CBC W/O DIFF    Collection Time: 04/06/21 11:47 PM   Result Value Ref Range    WBC 6.5 3.6 - 11.0 K/uL    RBC 2.20 (L) 3.80 - 5.20 M/uL    HGB 6.8 (L) 11.5 - 16.0 g/dL    HCT 21.7 (L) 35.0 - 47.0 %    MCV 98.6 80.0 - 99.0 FL    MCH 30.9 26.0 - 34.0 PG    MCHC 31.3 30.0 - 36.5 g/dL    RDW 16.1 (H) 11.5 - 14.5 %    PLATELET 220 995 - 957 K/uL    MPV 8.8 (L) 8.9 - 12.9 FL    NRBC 0.0 0  WBC    ABSOLUTE NRBC 0.00 0.00 - 0.01 K/uL   RENAL FUNCTION PANEL    Collection Time: 04/07/21  6:12 AM   Result Value Ref Range    Sodium 138 136 - 145 mmol/L    Potassium 4.3 3.5 - 5.1 mmol/L    Chloride 109 (H) 97 - 108 mmol/L    CO2 24 21 - 32 mmol/L    Anion gap 5 5 - 15 mmol/L    Glucose 96 65 - 100 mg/dL    BUN 25 (H) 6 - 20 MG/DL    Creatinine 1.31 (H) 0.55 - 1.02 MG/DL    BUN/Creatinine ratio 19 12 - 20      GFR est AA 47 (L) >60 ml/min/1.73m2    GFR est non-AA 39 (L) >60 ml/min/1.73m2    Calcium 8.8 8.5 - 10.1 MG/DL    Phosphorus 3.3 2.6 - 4.7 MG/DL    Albumin 2.8 (L) 3.5 - 5.0 g/dL   CBC WITH AUTOMATED DIFF    Collection Time: 04/07/21  6:12 AM   Result Value Ref Range    WBC 6.1 3.6 - 11.0 K/uL    RBC 2.72 (L) 3.80 - 5.20 M/uL    HGB 8.2 (L) 11.5 - 16.0 g/dL    HCT 25.8 (L) 35.0 - 47.0 %    MCV 94.9 80.0 - 99.0 FL    MCH 30.1 26.0 - 34.0 PG    MCHC 31.8 30.0 - 36.5 g/dL    RDW 18.4 (H) 11.5 - 14.5 %    PLATELET 413 478 - 730 K/uL    MPV 8.7 (L) 8.9 - 12.9 FL    NRBC 0.0 0  WBC    ABSOLUTE NRBC 0.00 0.00 - 0.01 K/uL    NEUTROPHILS 67 32 - 75 %    LYMPHOCYTES 16 12 - 49 % MONOCYTES 11 5 - 13 %    EOSINOPHILS 5 0 - 7 %    BASOPHILS 1 0 - 1 %    IMMATURE GRANULOCYTES 0 0.0 - 0.5 %    ABS. NEUTROPHILS 4.0 1.8 - 8.0 K/UL    ABS. LYMPHOCYTES 1.0 0.8 - 3.5 K/UL    ABS. MONOCYTES 0.7 0.0 - 1.0 K/UL    ABS. EOSINOPHILS 0.3 0.0 - 0.4 K/UL    ABS. BASOPHILS 0.1 0.0 - 0.1 K/UL    ABS. IMM.  GRANS. 0.0 0.00 - 0.04 K/UL    DF SMEAR SCANNED      RBC COMMENTS ANISOCYTOSIS  1+              Radiology:        Current Facility-Administered Medications   Medication Dose Route Frequency    traMADoL (ULTRAM) tablet 50 mg  50 mg Oral Q6H PRN    carvediloL (COREG) tablet 3.125 mg  3.125 mg Oral BID WITH MEALS    hydrALAZINE (APRESOLINE) tablet 25 mg  25 mg Oral TID    bumetanide (BUMEX) tablet 1 mg  1 mg Oral DAILY    polyethylene glycol (MIRALAX) packet 17 g  17 g Oral DAILY    0.9% sodium chloride infusion 250 mL  250 mL IntraVENous PRN    isosorbide mononitrate ER (IMDUR) tablet 30 mg  30 mg Oral DAILY    zolpidem (AMBIEN) tablet 5 mg  5 mg Oral QHS PRN    meropenem (MERREM) 500 mg in 0.9% sodium chloride (MBP/ADV) 50 mL MBP  0.5 g IntraVENous Q8H    sodium chloride (NS) flush 5-40 mL  5-40 mL IntraVENous PRN    sodium chloride (NS) flush 5-40 mL  5-40 mL IntraVENous Q8H    sodium chloride (NS) flush 5-40 mL  5-40 mL IntraVENous PRN    acetaminophen (TYLENOL) tablet 650 mg  650 mg Oral Q6H PRN    Or    acetaminophen (TYLENOL) suppository 650 mg  650 mg Rectal Q6H PRN    promethazine (PHENERGAN) tablet 12.5 mg  12.5 mg Oral Q6H PRN    Or    ondansetron (ZOFRAN) injection 4 mg  4 mg IntraVENous Q6H PRN    aspirin delayed-release tablet 81 mg  81 mg Oral QHS    atorvastatin (LIPITOR) tablet 40 mg  40 mg Oral QHS    carbidopa-levodopa (SINEMET)  mg per tablet 2 Tab  2 Tab Oral QID    clopidogreL (PLAVIX) tablet 75 mg  75 mg Oral DAILY AFTER BREAKFAST    DULoxetine (CYMBALTA) capsule 120 mg  120 mg Oral DAILY    pantoprazole (PROTONIX) tablet 40 mg  40 mg Oral ACB    hydrALAZINE (APRESOLINE) 20 mg/mL injection 20 mg  20 mg IntraVENous Q6H PRN          Monika Sarmiento MD     I concur with the above note, findings and assessment. She seems to be feeling slightly better in terms of her breathing and does not report any further chest discomfort. BP (!) 112/45   Pulse (!) 58   Temp 98.1 °F (36.7 °C)   Resp 19   Ht 5' 5\" (1.651 m)   Wt 158 lb 4.8 oz (71.8 kg)   SpO2 94%   BMI 26.34 kg/m²   General:    Alert, cooperative, no distress. Psychiatric:    Normal Mood and affect    Eye/ENT:      Pupils equal, No asymmetry, Conjunctival pink. Able to hear voice at normal amplitude   Lungs:      Visibly symmetric chest expansion, No palpable tenderness. Basal crackls    Heart[de-identified]    Regular rate and rhythm, S1, S2 normal, no murmur, click, rub or gallop. No JVD, Normal palpable peripheral pulses. No cyanosis   Abdomen:     Soft, non-tender. Bowel sounds normal. No masses,  No      organomegaly. Extremities:   Extremities normal, atraumatic, no edema. Neurologic:   CN II-XII grossly intact. No gross focal deficits       Patient was seen with NP/PA, recent investigations were reviewed and treatment care/plan was formulated together. There was a concern on echocardiogram regarding possible LV thrombus. We will perform definitive study with with echocardiogram.  She will require more assistance than she currently has at home. May require anesthesia care during her discharge. If she is here through the weekend, will perform renal angiogram on Monday. She will definitively require diuretic therapy given dilated IVC on her echocardiogram and persistent clinical evidence of congestion. Particularly given underlying functional mitral and tricuspid regurgitation, lack of diuretics on board will put her at high risk of repeat. Reviewed her echocardiogram demonstrates significant dyssynchrony of the septum due to underlying left bundle branch block.   She may benefit from cardiac resynchronization therapy long-term but given recent PCI will have to wait for 3 months before she can receive CRT  Nahid Calhoun MD   4/7/2021  11:43 AM             [facility] [address] [place] [address], [address] [place] [address],[address]   [place]   [phone]

## 2021-04-07 NOTE — PROGRESS NOTES
[facility]                                                                                          Hospitalist Progress Note  Camron Lemus MD  Answering service: [phone] OR [phone] from in house phone              Progress Note    Patient: Melonie Pacheco MRN: [id number]  SSN: [id number]    YOB: 1940  Age: 79 y.o. Sex: female      Admit Date: 3/29/2021    LOS: 9 days     Subjective:     Patient presents with acute on chronic CHF as well as A. fib with RVR. Breathing is improving and currently rate is controlled. Patient has no acute complaint today. S/p cardiac cath with placement of stent to LAD. Overall doing well. Objective:     Vitals:    04/07/21 0529 04/07/21 0746 04/07/21 0915 04/07/21 1211   BP:  (!) 167/54 (!) 141/53 (!) 122/47   Pulse:  67 64 (!) 57   Resp:  19 20 23   Temp:  97.8 °F (36.6 °C)  97.6 °F (36.4 °C)   SpO2:  94%     Weight: 71.8 kg (158 lb 4.8 oz)      Height:            Intake and Output:  Current Shift: 04/07 0701 - 04/07 1900  In: -   Out: 125 [Urine:125]  Last three shifts: 04/05 1901 - 04/07 0700  In: 9762 [P.O.:340; I.V.:1065]  Out: 600 [Urine:600]    Physical Exam:   GENERAL: alert, cooperative, no distress, appears stated age  THROAT & NECK: normal and no erythema or exudates noted. LUNG: clear to auscultation bilaterally  HEART: regular rate and rhythm, S1, S2 normal, no murmur, click, rub or gallop  ABDOMEN: soft, non-tender. Bowel sounds normal. No masses,  no organomegaly  EXTREMITIES:  extremities normal, atraumatic, no cyanosis or edema  SKIN: no rash or abnormalities  NEUROLOGIC: AOx3. PSYCHIATRIC: non focal    Lab/Data Review: All lab results for the last 24 hours reviewed.      Recent Results (from the past 24 hour(s))   METABOLIC PANEL, COMPREHENSIVE    Collection Time: 04/06/21  6:57 PM   Result Value Ref Range    Sodium 140 136 - 145 mmol/L    Potassium 4.0 3.5 - 5.1 mmol/L    Chloride 112 (H) 97 - 108 mmol/L    CO2 23 21 - 32 mmol/L    Anion gap 5 5 - 15 mmol/L    Glucose 128 (H) 65 - 100 mg/dL    BUN 24 (H) 6 - 20 MG/DL    Creatinine 1.22 (H) 0.55 - 1.02 MG/DL    BUN/Creatinine ratio 20 12 - 20      GFR est AA 52 (L) >60 ml/min/1.73m2    GFR est non-AA 43 (L) >60 ml/min/1.73m2    Calcium 8.3 (L) 8.5 - 10.1 MG/DL    Bilirubin, total 0.3 0.2 - 1.0 MG/DL    ALT (SGPT) 8 (L) 12 - 78 U/L    AST (SGOT) 12 (L) 15 - 37 U/L    Alk. phosphatase 58 45 - 117 U/L    Protein, total 4.9 (L) 6.4 - 8.2 g/dL    Albumin 2.5 (L) 3.5 - 5.0 g/dL    Globulin 2.4 2.0 - 4.0 g/dL    A-G Ratio 1.0 (L) 1.1 - 2.2     MAGNESIUM    Collection Time: 04/06/21  6:57 PM   Result Value Ref Range    Magnesium 1.9 1.6 - 2.4 mg/dL   CBC W/O DIFF    Collection Time: 04/06/21  6:57 PM   Result Value Ref Range    WBC 5.7 3.6 - 11.0 K/uL    RBC 2.02 (L) 3.80 - 5.20 M/uL    HGB 6.5 (L) 11.5 - 16.0 g/dL    HCT 20.5 (L) 35.0 - 47.0 %    .5 (H) 80.0 - 99.0 FL    MCH 32.2 26.0 - 34.0 PG    MCHC 31.7 30.0 - 36.5 g/dL    RDW 16.2 (H) 11.5 - 14.5 %    PLATELET 141 461 - 608 K/uL    MPV 8.7 (L) 8.9 - 12.9 FL    NRBC 0.0 0  WBC    ABSOLUTE NRBC 0.00 0.00 - 0.01 K/uL   TYPE & SCREEN    Collection Time: 04/06/21  6:57 PM   Result Value Ref Range    Crossmatch Expiration 04/09/2021,2359     ABO/Rh(D) B POSITIVE     Antibody screen NEG     Unit number [id number]     Blood component type RC LR     Unit division 00     Status of unit ISSUED     Crossmatch result Compatible    RBC, ALLOCATE    Collection Time: 04/06/21 11:15 PM   Result Value Ref Range    HISTORY CHECKED?  Historical check performed    CBC W/O DIFF    Collection Time: 04/06/21 11:47 PM   Result Value Ref Range    WBC 6.5 3.6 - 11.0 K/uL    RBC 2.20 (L) 3.80 - 5.20 M/uL    HGB 6.8 (L) 11.5 - 16.0 g/dL    HCT 21.7 (L) 35.0 - 47.0 %    MCV 98.6 80.0 - 99.0 FL    MCH 30.9 26.0 - 34.0 PG    MCHC 31.3 30.0 - 36.5 g/dL    RDW 16.1 (H) 11.5 - 14.5 %    PLATELET 234 140 - 847 K/uL    MPV 8.8 (L) 8.9 - 12.9 FL    NRBC 0.0 0  WBC    ABSOLUTE NRBC 0.00 0.00 - 0.01 K/uL   RENAL FUNCTION PANEL    Collection Time: 04/07/21  6:12 AM   Result Value Ref Range    Sodium 138 136 - 145 mmol/L    Potassium 4.3 3.5 - 5.1 mmol/L    Chloride 109 (H) 97 - 108 mmol/L    CO2 24 21 - 32 mmol/L    Anion gap 5 5 - 15 mmol/L    Glucose 96 65 - 100 mg/dL    BUN 25 (H) 6 - 20 MG/DL    Creatinine 1.31 (H) 0.55 - 1.02 MG/DL    BUN/Creatinine ratio 19 12 - 20      GFR est AA 47 (L) >60 ml/min/1.73m2    GFR est non-AA 39 (L) >60 ml/min/1.73m2    Calcium 8.8 8.5 - 10.1 MG/DL    Phosphorus 3.3 2.6 - 4.7 MG/DL    Albumin 2.8 (L) 3.5 - 5.0 g/dL   CBC WITH AUTOMATED DIFF    Collection Time: 04/07/21  6:12 AM   Result Value Ref Range    WBC 6.1 3.6 - 11.0 K/uL    RBC 2.72 (L) 3.80 - 5.20 M/uL    HGB 8.2 (L) 11.5 - 16.0 g/dL    HCT 25.8 (L) 35.0 - 47.0 %    MCV 94.9 80.0 - 99.0 FL    MCH 30.1 26.0 - 34.0 PG    MCHC 31.8 30.0 - 36.5 g/dL    RDW 18.4 (H) 11.5 - 14.5 %    PLATELET 494 069 - 003 K/uL    MPV 8.7 (L) 8.9 - 12.9 FL    NRBC 0.0 0  WBC    ABSOLUTE NRBC 0.00 0.00 - 0.01 K/uL    NEUTROPHILS 67 32 - 75 %    LYMPHOCYTES 16 12 - 49 %    MONOCYTES 11 5 - 13 %    EOSINOPHILS 5 0 - 7 %    BASOPHILS 1 0 - 1 %    IMMATURE GRANULOCYTES 0 0.0 - 0.5 %    ABS. NEUTROPHILS 4.0 1.8 - 8.0 K/UL    ABS. LYMPHOCYTES 1.0 0.8 - 3.5 K/UL    ABS. MONOCYTES 0.7 0.0 - 1.0 K/UL    ABS. EOSINOPHILS 0.3 0.0 - 0.4 K/UL    ABS. BASOPHILS 0.1 0.0 - 0.1 K/UL    ABS. IMM.  GRANS. 0.0 0.00 - 0.04 K/UL    DF SMEAR SCANNED      RBC COMMENTS ANISOCYTOSIS  1+            Imaging:      Assessment and Plan:     Congestive heart failure  -Acute systolic congestive heart failure NYHA class III  -Ischemic cardiomyopathy with EF of 25-30, new onset  -Diuresis with Bumex, continue hydralazine, nitrates and Coreg  -Cardiology following    Coronary artery disease  -Status post cardiac cath 4/1, s/p PCI with STEPHAN to LAD  -Continue dual antiplatelet  -Cardiology following    Possible LV thrombus  -Noted on initial echocardiogram  -Repeat limited echo reviewed by cardiology, ruled out for LV thrombus    Diaphoresis  -Low probability for PE on V/Q  -Oxygenation improving and patient clinically improved    Atrial fibrillation with RVR  -Rate is now controlled, continue Coreg  -Not on anticoagulation at current    Valvular heart disease  -Moderate to severe mitral and tricuspid regurgitation on echo  -Plan per cardiology    MORENO  -MORENO on CKD stage III  -Improving renal function  -Nephrology following  -Continue diuretic    Renal artery stenosis  -Left renal artery high-grade stenosis and mild stenosis of the right renal artery on MRA of the abdomen  -Renal angiogram, bilateral, today, does not show critical disease (50-70% stenosis on left and 40-50% stenosis on right)    Aortic aneurysm  -Infrarenal aortic aneurysm of 3.9 cm, incidental finding on MRA of the abdomen  -Outpatient follow-up     Urinary tract infection  -Urine culture show ESBL Klebsiella  -Continue meropenem  -ID consult for antibiotic recommendation upon discharge    Hypertension  -Labile blood pressure, hypotensive episode but responded to saline bolus  -Continue Coreg, isosorbide and hydralazine    Parkinson disease   -Continue Sinemet    Anemia  -Anemia of chronic kidney disease  -S/p transfusion of 1 unit PRBCs 4/6  -Monitor H&H    Insomnia  -Ambien as needed    Discharge disposition: Likely 24 hours pending progress. PT evaluation.     Signed By: Jose Miguel Calix MD     April 7, 2021

## 2021-04-07 NOTE — PROGRESS NOTES
[facility]   [address] [facility], [facility] [address], [facility]  Phone: [phone]   [phone]       Nephrology Progress Note  Lisa Marks     [birth year]     [id number]  Date of Admission : 3/29/2021  04/07/21    CC: Follow up for ARF    Assessment and Plan   MORENO on CKD   - Etiology : progressive renovascular disease   - Cr at baseline  - cont present care    Renal artery disease  - MRA confirmed severe / complete Left RA occlusion   - angio showing  50-70& dz L, 40-50% R    Infra renal AAA  - 3.9 cm   - follow up w/ vascular as out pt     CKD 3a  - baseline Cr ~ 1.4-1.5 mg/dl   - renal US: progressive Left > R renal atrophy. Benign cysts   - presumed to be 2/2 DM, HTN      Pulm edema/ Effusions/ Systolic HF  CAD s/p CABG 2015  - diuresis as above   - Echo EF 25%   - [facility] 4/1: patent grafts     Parkinson Dz      Anemia:  - 2/2 CKD + blood loss post cath  - hgb stable after PRBCs yesterday     PVD  S/P Left CEA        Interval History:  Seen and examined. Feeling ok. BP low, required NS bolus, now stable.  hgb better after PRBCs yesterday. No cp, sob, n/v/d reported. Review of Systems: A comprehensive review of systems was negative except for that written in the HPI.     Current Medications:   Current Facility-Administered Medications   Medication Dose Route Frequency    carvediloL (COREG) tablet 3.125 mg  3.125 mg Oral BID WITH MEALS    hydrALAZINE (APRESOLINE) tablet 25 mg  25 mg Oral TID    bumetanide (BUMEX) tablet 1 mg  1 mg Oral DAILY    polyethylene glycol (MIRALAX) packet 17 g  17 g Oral DAILY    0.9% sodium chloride infusion 250 mL  250 mL IntraVENous PRN    isosorbide mononitrate ER (IMDUR) tablet 30 mg  30 mg Oral DAILY    zolpidem (AMBIEN) tablet 5 mg  5 mg Oral QHS PRN    meropenem (MERREM) 500 mg in 0.9% sodium chloride (MBP/ADV) 50 mL MBP  0.5 g IntraVENous Q8H    sodium chloride (NS) flush 5-40 mL  5-40 mL IntraVENous PRN    sodium chloride (NS) flush 5-40 mL  5-40 mL IntraVENous Q8H    sodium chloride (NS) flush 5-40 mL  5-40 mL IntraVENous PRN    acetaminophen (TYLENOL) tablet 650 mg  650 mg Oral Q6H PRN    Or    acetaminophen (TYLENOL) suppository 650 mg  650 mg Rectal Q6H PRN    promethazine (PHENERGAN) tablet 12.5 mg  12.5 mg Oral Q6H PRN    Or    ondansetron (ZOFRAN) injection 4 mg  4 mg IntraVENous Q6H PRN    aspirin delayed-release tablet 81 mg  81 mg Oral QHS    atorvastatin (LIPITOR) tablet 40 mg  40 mg Oral QHS    carbidopa-levodopa (SINEMET)  mg per tablet 2 Tab  2 Tab Oral QID    clopidogreL (PLAVIX) tablet 75 mg  75 mg Oral DAILY AFTER BREAKFAST    DULoxetine (CYMBALTA) capsule 120 mg  120 mg Oral DAILY    pantoprazole (PROTONIX) tablet 40 mg  40 mg Oral ACB    hydrALAZINE (APRESOLINE) 20 mg/mL injection 20 mg  20 mg IntraVENous Q6H PRN      No Known Allergies    Objective:  Vitals:    Vitals:    04/07/21 0230 04/07/21 0300 04/07/21 0529 04/07/21 0746   BP: (!) 127/36 (!) 123/45  (!) 167/54   Pulse: (!) 49 (!) 51  67   Resp: 17 17  19   Temp:  98 °F (36.7 °C)  97.8 °F (36.6 °C)   SpO2: 96% 94%  94%   Weight:   71.8 kg (158 lb 4.8 oz)    Height:         Intake and Output:  No intake/output data recorded. 04/05 1901 - 04/07 0700  In: 1603 [P.O.:340; I.V.:1065]  Out: 600 [Urine:600]    Physical Examination:  General:  NAD, resting in bed  HEENT: PERRL,  ++ Pallor , No Icterus  Neck: Supple,no mass palpable  Lungs :CTA b/l  CVS: RRR, S1 S2 normal, No murmur   Abdomen: Soft, NT, BS +  Extremities: no LE edema  Skin: discoloration in both legs . MS: No joint swelling, erythema, warmth  Neurologic: non focal, AAO x 3    []    High complexity decision making was performed  []    Patient is at high-risk of decompensation with multiple organ involvement    Lab Data Personally Reviewed: I have reviewed all the pertinent labs, microbiology data and radiology studies during assessment.     Recent Labs     04/07/21  0612 04/06/21  2564 04/06/21 0538 04/05/21 1922 04/05/21  0353    140 140 141 138   K 4.3 4.0 4.0 3.6 3.7   * 112* 112* 112* 108   CO2 24 23 23 23 24   GLU 96 128* 94 135* 117*   BUN 25* 24* 24* 22* 28*   CREA 1.31* 1.22* 1.32* 1.44* 1.84*   CA 8.8 8.3* 8.6 8.7 8.9   MG  --  1.9  --  1.7  --    PHOS 3.3  --   --   --  3.3   ALB 2.8* 2.5*  --  2.9* 3.1*   ALT  --  8*  --  <6*  --      Recent Labs     04/07/21 0612 04/06/21  2347 04/06/21  1857 04/06/21 0538 04/05/21 1922   WBC 6.1 6.5 5.7 6.2 5.0   HGB 8.2* 6.8* 6.5* 7.6* 8.4*   HCT 25.8* 21.7* 20.5* 25.7* 26.4*    184 186 176 195     No results found for: SDES  Lab Results   Component Value Date/Time    Culture result: (A) 04/02/2021 04:05 AM     KLEBSIELLA PNEUMONIAE ** (EXTENDED SPECTRUM BETA LACTAMASE ) **    Culture result: MRSA NOT PRESENT 12/26/2018 09:29 PM    Culture result:  12/26/2018 09:29 PM         Screening of patient nares for MRSA is for surveillance purposes and, if positive, to facilitate isolation considerations in high risk settings. It is not intended for automatic decolonization interventions per se as regimens are not sufficiently effective to warrant routine use. Culture result: MRSA PRESENT 02/09/2015 06:30 PM    Culture result:  02/09/2015 06:30 PM         Screening of patient nares for MRSA is for surveillance purposes and, if positive, to facilitate isolation considerations in high risk settings. It is not intended for automatic decolonization interventions per se as regimens are not sufficiently effective to warrant routine use.      Recent Results (from the past 24 hour(s))   METABOLIC PANEL, COMPREHENSIVE    Collection Time: 04/06/21  6:57 PM   Result Value Ref Range    Sodium 140 136 - 145 mmol/L    Potassium 4.0 3.5 - 5.1 mmol/L    Chloride 112 (H) 97 - 108 mmol/L    CO2 23 21 - 32 mmol/L    Anion gap 5 5 - 15 mmol/L    Glucose 128 (H) 65 - 100 mg/dL    BUN 24 (H) 6 - 20 MG/DL    Creatinine 1.22 (H) 0.55 - 1.02 MG/DL BUN/Creatinine ratio 20 12 - 20      GFR est AA 52 (L) >60 ml/min/1.73m2    GFR est non-AA 43 (L) >60 ml/min/1.73m2    Calcium 8.3 (L) 8.5 - 10.1 MG/DL    Bilirubin, total 0.3 0.2 - 1.0 MG/DL    ALT (SGPT) 8 (L) 12 - 78 U/L    AST (SGOT) 12 (L) 15 - 37 U/L    Alk. phosphatase 58 45 - 117 U/L    Protein, total 4.9 (L) 6.4 - 8.2 g/dL    Albumin 2.5 (L) 3.5 - 5.0 g/dL    Globulin 2.4 2.0 - 4.0 g/dL    A-G Ratio 1.0 (L) 1.1 - 2.2     MAGNESIUM    Collection Time: 04/06/21  6:57 PM   Result Value Ref Range    Magnesium 1.9 1.6 - 2.4 mg/dL   CBC W/O DIFF    Collection Time: 04/06/21  6:57 PM   Result Value Ref Range    WBC 5.7 3.6 - 11.0 K/uL    RBC 2.02 (L) 3.80 - 5.20 M/uL    HGB 6.5 (L) 11.5 - 16.0 g/dL    HCT 20.5 (L) 35.0 - 47.0 %    .5 (H) 80.0 - 99.0 FL    MCH 32.2 26.0 - 34.0 PG    MCHC 31.7 30.0 - 36.5 g/dL    RDW 16.2 (H) 11.5 - 14.5 %    PLATELET 852 450 - 808 K/uL    MPV 8.7 (L) 8.9 - 12.9 FL    NRBC 0.0 0  WBC    ABSOLUTE NRBC 0.00 0.00 - 0.01 K/uL   TYPE & SCREEN    Collection Time: 04/06/21  6:57 PM   Result Value Ref Range    Crossmatch Expiration 04/09/2021,1546     ABO/Rh(D) B POSITIVE     Antibody screen NEG     Unit number [id number]     Blood component type RC LR     Unit division 00     Status of unit ISSUED     Crossmatch result Compatible    RBC, ALLOCATE    Collection Time: 04/06/21 11:15 PM   Result Value Ref Range    HISTORY CHECKED?  Historical check performed    CBC W/O DIFF    Collection Time: 04/06/21 11:47 PM   Result Value Ref Range    WBC 6.5 3.6 - 11.0 K/uL    RBC 2.20 (L) 3.80 - 5.20 M/uL    HGB 6.8 (L) 11.5 - 16.0 g/dL    HCT 21.7 (L) 35.0 - 47.0 %    MCV 98.6 80.0 - 99.0 FL    MCH 30.9 26.0 - 34.0 PG    MCHC 31.3 30.0 - 36.5 g/dL    RDW 16.1 (H) 11.5 - 14.5 %    PLATELET 826 375 - 571 K/uL    MPV 8.8 (L) 8.9 - 12.9 FL    NRBC 0.0 0  WBC    ABSOLUTE NRBC 0.00 0.00 - 0.01 K/uL   RENAL FUNCTION PANEL    Collection Time: 04/07/21  6:12 AM   Result Value Ref Range Sodium 138 136 - 145 mmol/L    Potassium 4.3 3.5 - 5.1 mmol/L    Chloride 109 (H) 97 - 108 mmol/L    CO2 24 21 - 32 mmol/L    Anion gap 5 5 - 15 mmol/L    Glucose 96 65 - 100 mg/dL    BUN 25 (H) 6 - 20 MG/DL    Creatinine 1.31 (H) 0.55 - 1.02 MG/DL    BUN/Creatinine ratio 19 12 - 20      GFR est AA 47 (L) >60 ml/min/1.73m2    GFR est non-AA 39 (L) >60 ml/min/1.73m2    Calcium 8.8 8.5 - 10.1 MG/DL    Phosphorus 3.3 2.6 - 4.7 MG/DL    Albumin 2.8 (L) 3.5 - 5.0 g/dL   CBC WITH AUTOMATED DIFF    Collection Time: 04/07/21  6:12 AM   Result Value Ref Range    WBC 6.1 3.6 - 11.0 K/uL    RBC 2.72 (L) 3.80 - 5.20 M/uL    HGB 8.2 (L) 11.5 - 16.0 g/dL    HCT 25.8 (L) 35.0 - 47.0 %    MCV 94.9 80.0 - 99.0 FL    MCH 30.1 26.0 - 34.0 PG    MCHC 31.8 30.0 - 36.5 g/dL    RDW 18.4 (H) 11.5 - 14.5 %    PLATELET 994 824 - 409 K/uL    MPV 8.7 (L) 8.9 - 12.9 FL    NRBC 0.0 0  WBC    ABSOLUTE NRBC 0.00 0.00 - 0.01 K/uL    NEUTROPHILS PENDING %    LYMPHOCYTES PENDING %    MONOCYTES PENDING %    EOSINOPHILS PENDING %    BASOPHILS PENDING %    IMMATURE GRANULOCYTES PENDING %    ABS. NEUTROPHILS PENDING K/UL    ABS. LYMPHOCYTES PENDING K/UL    ABS. MONOCYTES PENDING K/UL    ABS. EOSINOPHILS PENDING K/UL    ABS. BASOPHILS PENDING K/UL    ABS. IMM. GRANS. PENDING K/UL    DF PENDING            Total time spent with patient:  xxx   min. Care Plan discussed with:  Patient     Family      RN      Consulting Physician [facility],         I have reviewed the flowsheets. Chart and Pertinent Notes have been reviewed. No change in PMH ,family and social history from Consult note.       Nish Justin MD

## 2021-04-07 NOTE — PROGRESS NOTES
1530: Bedside shift change report given to [address] (oncoming nurse) by Kate Garza (offgoing nurse).  Report included the following information SBAR, Kardex, Intake/Output, MAR, Recent Results, Med Rec Status and Cardiac Rhythm SB.     1930:

## 2021-04-07 NOTE — PROGRESS NOTES
Occupational Therapy    Completed chart review and nursing cleared for OT session. Received patient in bed. She reports recently returning to bed and politely requesting to rest at this time. Will defer OT and continue to follow for weekly re-assessment.      Thank you,    Morena Vick, OT

## 2021-04-08 LAB
ABO + RH BLD: NORMAL
ALBUMIN SERPL-MCNC: 2.9 G/DL (ref 3.5–5)
ANION GAP SERPL CALC-SCNC: 10 MMOL/L (ref 5–15)
BASOPHILS # BLD: 0.1 K/UL (ref 0–0.1)
BASOPHILS NFR BLD: 1 % (ref 0–1)
BLD PROD TYP BPU: NORMAL
BLOOD GROUP ANTIBODIES SERPL: NORMAL
BPU ID: NORMAL
BUN SERPL-MCNC: 25 MG/DL (ref 6–20)
BUN/CREAT SERPL: 17 (ref 12–20)
CALCIUM SERPL-MCNC: 9.1 MG/DL (ref 8.5–10.1)
CHLORIDE SERPL-SCNC: 107 MMOL/L (ref 97–108)
CO2 SERPL-SCNC: 21 MMOL/L (ref 21–32)
CREAT SERPL-MCNC: 1.47 MG/DL (ref 0.55–1.02)
CROSSMATCH RESULT,%XM: NORMAL
DIFFERENTIAL METHOD BLD: ABNORMAL
EOSINOPHIL # BLD: 0.5 K/UL (ref 0–0.4)
EOSINOPHIL NFR BLD: 7 % (ref 0–7)
ERYTHROCYTE [DISTWIDTH] IN BLOOD BY AUTOMATED COUNT: 18.2 % (ref 11.5–14.5)
GLUCOSE SERPL-MCNC: 102 MG/DL (ref 65–100)
HCT VFR BLD AUTO: 25.7 % (ref 35–47)
HGB BLD-MCNC: 8.3 G/DL (ref 11.5–16)
IMM GRANULOCYTES # BLD AUTO: 0 K/UL (ref 0–0.04)
IMM GRANULOCYTES NFR BLD AUTO: 0 % (ref 0–0.5)
LYMPHOCYTES # BLD: 1.1 K/UL (ref 0.8–3.5)
LYMPHOCYTES NFR BLD: 15 % (ref 12–49)
MCH RBC QN AUTO: 30.6 PG (ref 26–34)
MCHC RBC AUTO-ENTMCNC: 32.3 G/DL (ref 30–36.5)
MCV RBC AUTO: 94.8 FL (ref 80–99)
MONOCYTES # BLD: 0.9 K/UL (ref 0–1)
MONOCYTES NFR BLD: 12 % (ref 5–13)
NEUTS SEG # BLD: 5.1 K/UL (ref 1.8–8)
NEUTS SEG NFR BLD: 65 % (ref 32–75)
NRBC # BLD: 0 K/UL (ref 0–0.01)
NRBC BLD-RTO: 0 PER 100 WBC
PHOSPHATE SERPL-MCNC: 3.7 MG/DL (ref 2.6–4.7)
PLATELET # BLD AUTO: 199 K/UL (ref 150–400)
PMV BLD AUTO: 8.8 FL (ref 8.9–12.9)
POTASSIUM SERPL-SCNC: 4.4 MMOL/L (ref 3.5–5.1)
RBC # BLD AUTO: 2.71 M/UL (ref 3.8–5.2)
SODIUM SERPL-SCNC: 138 MMOL/L (ref 136–145)
SPECIMEN EXP DATE BLD: NORMAL
STATUS OF UNIT,%ST: NORMAL
UNIT DIVISION, %UDIV: 0
WBC # BLD AUTO: 7.7 K/UL (ref 3.6–11)

## 2021-04-08 PROCEDURE — 36415 COLL VENOUS BLD VENIPUNCTURE: CPT

## 2021-04-08 PROCEDURE — 74011250637 HC RX REV CODE- 250/637: Performed by: FAMILY MEDICINE

## 2021-04-08 PROCEDURE — 85025 COMPLETE CBC W/AUTO DIFF WBC: CPT

## 2021-04-08 PROCEDURE — 74011250637 HC RX REV CODE- 250/637: Performed by: NURSE PRACTITIONER

## 2021-04-08 PROCEDURE — 80069 RENAL FUNCTION PANEL: CPT

## 2021-04-08 PROCEDURE — 74011000258 HC RX REV CODE- 258: Performed by: FAMILY MEDICINE

## 2021-04-08 PROCEDURE — 74011250636 HC RX REV CODE- 250/636: Performed by: FAMILY MEDICINE

## 2021-04-08 PROCEDURE — 65660000000 HC RM CCU STEPDOWN

## 2021-04-08 PROCEDURE — 97535 SELF CARE MNGMENT TRAINING: CPT

## 2021-04-08 PROCEDURE — 77010033678 HC OXYGEN DAILY

## 2021-04-08 PROCEDURE — 65270000029 HC RM PRIVATE

## 2021-04-08 PROCEDURE — 99232 SBSQ HOSP IP/OBS MODERATE 35: CPT | Performed by: INTERNAL MEDICINE

## 2021-04-08 PROCEDURE — 94760 N-INVAS EAR/PLS OXIMETRY 1: CPT

## 2021-04-08 PROCEDURE — 74011250637 HC RX REV CODE- 250/637: Performed by: INTERNAL MEDICINE

## 2021-04-08 RX ORDER — LANOLIN ALCOHOL/MO/W.PET/CERES
3 CREAM (GRAM) TOPICAL
Status: DISCONTINUED | OUTPATIENT
Start: 2021-04-08 | End: 2021-04-09 | Stop reason: HOSPADM

## 2021-04-08 RX ADMIN — HYDRALAZINE HYDROCHLORIDE 25 MG: 25 TABLET, FILM COATED ORAL at 09:03

## 2021-04-08 RX ADMIN — POLYETHYLENE GLYCOL 3350 17 G: 17 POWDER, FOR SOLUTION ORAL at 09:03

## 2021-04-08 RX ADMIN — CARBIDOPA AND LEVODOPA 2 TABLET: 25; 100 TABLET ORAL at 18:08

## 2021-04-08 RX ADMIN — Medication 10 ML: at 07:02

## 2021-04-08 RX ADMIN — ASPIRIN 81 MG: 81 TABLET, COATED ORAL at 21:51

## 2021-04-08 RX ADMIN — ISOSORBIDE MONONITRATE 30 MG: 30 TABLET, EXTENDED RELEASE ORAL at 09:03

## 2021-04-08 RX ADMIN — CARVEDILOL 3.12 MG: 3.12 TABLET, FILM COATED ORAL at 16:00

## 2021-04-08 RX ADMIN — ATORVASTATIN CALCIUM 40 MG: 40 TABLET, FILM COATED ORAL at 21:51

## 2021-04-08 RX ADMIN — Medication 10 ML: at 11:28

## 2021-04-08 RX ADMIN — DULOXETINE HYDROCHLORIDE 120 MG: 60 CAPSULE, DELAYED RELEASE ORAL at 09:03

## 2021-04-08 RX ADMIN — BUMETANIDE 1 MG: 1 TABLET ORAL at 09:03

## 2021-04-08 RX ADMIN — MEROPENEM 500 MG: 500 INJECTION, POWDER, FOR SOLUTION INTRAVENOUS at 18:08

## 2021-04-08 RX ADMIN — HYDRALAZINE HYDROCHLORIDE 25 MG: 25 TABLET, FILM COATED ORAL at 15:58

## 2021-04-08 RX ADMIN — MEROPENEM 500 MG: 500 INJECTION, POWDER, FOR SOLUTION INTRAVENOUS at 03:00

## 2021-04-08 RX ADMIN — MEROPENEM 500 MG: 500 INJECTION, POWDER, FOR SOLUTION INTRAVENOUS at 10:42

## 2021-04-08 RX ADMIN — PANTOPRAZOLE SODIUM 40 MG: 40 TABLET, DELAYED RELEASE ORAL at 07:02

## 2021-04-08 RX ADMIN — CARBIDOPA AND LEVODOPA 2 TABLET: 25; 100 TABLET ORAL at 09:03

## 2021-04-08 RX ADMIN — CARVEDILOL 3.12 MG: 3.12 TABLET, FILM COATED ORAL at 07:02

## 2021-04-08 RX ADMIN — CLOPIDOGREL BISULFATE 75 MG: 75 TABLET ORAL at 09:03

## 2021-04-08 RX ADMIN — Medication 10 ML: at 21:51

## 2021-04-08 RX ADMIN — Medication 3 MG: at 01:36

## 2021-04-08 RX ADMIN — CARBIDOPA AND LEVODOPA 2 TABLET: 25; 100 TABLET ORAL at 21:51

## 2021-04-08 RX ADMIN — CARBIDOPA AND LEVODOPA 2 TABLET: 25; 100 TABLET ORAL at 12:09

## 2021-04-08 RX ADMIN — HYDRALAZINE HYDROCHLORIDE 25 MG: 25 TABLET, FILM COATED ORAL at 22:03

## 2021-04-08 NOTE — PROGRESS NOTES
[facility]   [address] [facility], [facility] [address], [facility]  Phone: [phone]   [phone]       Nephrology Progress Note  Mayra Nails     [birth year]     [id number]  Date of Admission : 3/29/2021  04/08/21    CC: Follow up for ARF    Assessment and Plan   MORENO on CKD   - Etiology : progressive renovascular disease   - resolved  - Cr at baseline    Renal artery disease  - MRA confirmed severe / complete Left RA occlusion   - angio showing  50-70& dz L, 40-50% R    Infra renal AAA  - 3.9 cm   - follow up w/ vascular as out pt     CKD 3a  - baseline Cr ~ 1.4-1.5 mg/dl   - renal US: progressive Left > R renal atrophy. Benign cysts   - presumed to be 2/2 DM, HTN      Pulm edema/ Effusions/ Systolic HF  CAD s/p CABG 2015  - diuresis as above   - Echo EF 25%   - [facility] 4/1: patent grafts     Parkinson Dz      Anemia:  - 2/2 CKD + blood loss post cath  - hgb stable after PRBCs on 4/6     PVD  S/P Left CEA        Interval History:  Seen and examined. Feeling ok. BP labile. Feeling ok.  hgb stable. No cp, sob, n/v/d reported. Review of Systems: A comprehensive review of systems was negative except for that written in the HPI.     Current Medications:   Current Facility-Administered Medications   Medication Dose Route Frequency    melatonin tablet 3 mg  3 mg Oral QHS PRN    traMADoL (ULTRAM) tablet 50 mg  50 mg Oral Q6H PRN    carvediloL (COREG) tablet 3.125 mg  3.125 mg Oral BID WITH MEALS    hydrALAZINE (APRESOLINE) tablet 25 mg  25 mg Oral TID    bumetanide (BUMEX) tablet 1 mg  1 mg Oral DAILY    polyethylene glycol (MIRALAX) packet 17 g  17 g Oral DAILY    0.9% sodium chloride infusion 250 mL  250 mL IntraVENous PRN    isosorbide mononitrate ER (IMDUR) tablet 30 mg  30 mg Oral DAILY    zolpidem (AMBIEN) tablet 5 mg  5 mg Oral QHS PRN    meropenem (MERREM) 500 mg in 0.9% sodium chloride (MBP/ADV) 50 mL MBP  0.5 g IntraVENous Q8H    sodium chloride (NS) flush 5-40 mL  5-40 mL IntraVENous PRN    sodium chloride (NS) flush 5-40 mL  5-40 mL IntraVENous Q8H    sodium chloride (NS) flush 5-40 mL  5-40 mL IntraVENous PRN    acetaminophen (TYLENOL) tablet 650 mg  650 mg Oral Q6H PRN    Or    acetaminophen (TYLENOL) suppository 650 mg  650 mg Rectal Q6H PRN    promethazine (PHENERGAN) tablet 12.5 mg  12.5 mg Oral Q6H PRN    Or    ondansetron (ZOFRAN) injection 4 mg  4 mg IntraVENous Q6H PRN    aspirin delayed-release tablet 81 mg  81 mg Oral QHS    atorvastatin (LIPITOR) tablet 40 mg  40 mg Oral QHS    carbidopa-levodopa (SINEMET)  mg per tablet 2 Tab  2 Tab Oral QID    clopidogreL (PLAVIX) tablet 75 mg  75 mg Oral DAILY AFTER BREAKFAST    DULoxetine (CYMBALTA) capsule 120 mg  120 mg Oral DAILY    pantoprazole (PROTONIX) tablet 40 mg  40 mg Oral ACB    hydrALAZINE (APRESOLINE) 20 mg/mL injection 20 mg  20 mg IntraVENous Q6H PRN      No Known Allergies    Objective:  Vitals:    Vitals:    04/07/21 1527 04/07/21 1714 04/07/21 2046 04/08/21 0231   BP: (!) 112/45 (!) 105/55 118/62 135/71   Pulse: (!) 58 60 67 70   Resp: 19 21 20 18   Temp: 98.1 °F (36.7 °C) 98.7 °F (37.1 °C) 98.7 °F (37.1 °C) 98.3 °F (36.8 °C)   SpO2: 94% 98% 96% 93%   Weight:    72.3 kg (159 lb 4.8 oz)   Height:         Intake and Output:  04/08 0701 - 04/08 1900  In: -   Out: 1000 [Urine:1000]  04/06 1901 - 04/08 0700  In: 1230 [P.O.:720; I.V.:200]  Out: 425 [Urine:425]    Physical Examination:  General:  NAD, resting in bed  HEENT: PERRL,  ++ Pallor , No Icterus  Neck: Supple,no mass palpable  Lungs :CTA b/l  CVS: RRR, S1 S2 normal, No murmur   Abdomen: Soft, NT, BS +  Extremities: no LE edema  Skin: discoloration in both legs .   MS: No joint swelling, erythema, warmth  Neurologic: non focal, AAO x 3    []    High complexity decision making was performed  []    Patient is at high-risk of decompensation with multiple organ involvement    Lab Data Personally Reviewed: I have reviewed all the pertinent labs, microbiology data and radiology studies during assessment. Recent Labs     04/08/21  0144 04/07/21  0612 04/06/21  1857 04/06/21  0538 04/05/21  1922    138 140 140 141   K 4.4 4.3 4.0 4.0 3.6    109* 112* 112* 112*   CO2 21 24 23 23 23   * 96 128* 94 135*   BUN 25* 25* 24* 24* 22*   CREA 1.47* 1.31* 1.22* 1.32* 1.44*   CA 9.1 8.8 8.3* 8.6 8.7   MG  --   --  1.9  --  1.7   PHOS 3.7 3.3  --   --   --    ALB 2.9* 2.8* 2.5*  --  2.9*   ALT  --   --  8*  --  <6*     Recent Labs     04/08/21 0144 04/07/21 0612 04/06/21 2347 04/06/21 1857 04/06/21  0538   WBC 7.7 6.1 6.5 5.7 6.2   HGB 8.3* 8.2* 6.8* 6.5* 7.6*   HCT 25.7* 25.8* 21.7* 20.5* 25.7*    185 184 186 176     No results found for: SDES  Lab Results   Component Value Date/Time    Culture result: (A) 04/02/2021 04:05 AM     KLEBSIELLA PNEUMONIAE ** (EXTENDED SPECTRUM BETA LACTAMASE ) **    Culture result: MRSA NOT PRESENT 12/26/2018 09:29 PM    Culture result:  12/26/2018 09:29 PM         Screening of patient nares for MRSA is for surveillance purposes and, if positive, to facilitate isolation considerations in high risk settings. It is not intended for automatic decolonization interventions per se as regimens are not sufficiently effective to warrant routine use. Culture result: MRSA PRESENT 02/09/2015 06:30 PM    Culture result:  02/09/2015 06:30 PM         Screening of patient nares for MRSA is for surveillance purposes and, if positive, to facilitate isolation considerations in high risk settings. It is not intended for automatic decolonization interventions per se as regimens are not sufficiently effective to warrant routine use.      Recent Results (from the past 24 hour(s))   RENAL FUNCTION PANEL    Collection Time: 04/08/21  1:44 AM   Result Value Ref Range    Sodium 138 136 - 145 mmol/L    Potassium 4.4 3.5 - 5.1 mmol/L    Chloride 107 97 - 108 mmol/L    CO2 21 21 - 32 mmol/L    Anion gap 10 5 - 15 mmol/L Glucose 102 (H) 65 - 100 mg/dL    BUN 25 (H) 6 - 20 MG/DL    Creatinine 1.47 (H) 0.55 - 1.02 MG/DL    BUN/Creatinine ratio 17 12 - 20      GFR est AA 42 (L) >60 ml/min/1.73m2    GFR est non-AA 34 (L) >60 ml/min/1.73m2    Calcium 9.1 8.5 - 10.1 MG/DL    Phosphorus 3.7 2.6 - 4.7 MG/DL    Albumin 2.9 (L) 3.5 - 5.0 g/dL   CBC WITH AUTOMATED DIFF    Collection Time: 04/08/21  1:44 AM   Result Value Ref Range    WBC 7.7 3.6 - 11.0 K/uL    RBC 2.71 (L) 3.80 - 5.20 M/uL    HGB 8.3 (L) 11.5 - 16.0 g/dL    HCT 25.7 (L) 35.0 - 47.0 %    MCV 94.8 80.0 - 99.0 FL    MCH 30.6 26.0 - 34.0 PG    MCHC 32.3 30.0 - 36.5 g/dL    RDW 18.2 (H) 11.5 - 14.5 %    PLATELET 952 470 - 368 K/uL    MPV 8.8 (L) 8.9 - 12.9 FL    NRBC 0.0 0  WBC    ABSOLUTE NRBC 0.00 0.00 - 0.01 K/uL    NEUTROPHILS 65 32 - 75 %    LYMPHOCYTES 15 12 - 49 %    MONOCYTES 12 5 - 13 %    EOSINOPHILS 7 0 - 7 %    BASOPHILS 1 0 - 1 %    IMMATURE GRANULOCYTES 0 0.0 - 0.5 %    ABS. NEUTROPHILS 5.1 1.8 - 8.0 K/UL    ABS. LYMPHOCYTES 1.1 0.8 - 3.5 K/UL    ABS. MONOCYTES 0.9 0.0 - 1.0 K/UL    ABS. EOSINOPHILS 0.5 (H) 0.0 - 0.4 K/UL    ABS. BASOPHILS 0.1 0.0 - 0.1 K/UL    ABS. IMM. GRANS. 0.0 0.00 - 0.04 K/UL    DF AUTOMATED             Total time spent with patient:  xxx   min. Care Plan discussed with:  Patient     Family      RN      Consulting Physician [facility],         I have reviewed the flowsheets. Chart and Pertinent Notes have been reviewed. No change in PMH ,family and social history from Consult note.       Nish Justin MD

## 2021-04-08 NOTE — PROGRESS NOTES
Problem: Self Care Deficits Care Plan (Adult)  Goal: *Acute Goals and Plan of Care (Insert Text)  Description: Description: FUNCTIONAL STATUS PRIOR TO ADMISSION: Patient was modified independent using a wheelchair and at times a RW for functional mobility. HOME SUPPORT PRIOR TO ADMISSION: The patient lived alone with an aide once a week for 4 hours to provide assistance. Occupational Therapy Goals  Initiated 3/30/2021  1. Patient will perform bathing with modified independence within 7 day(s). 2.  Patient will perform grooming standing at sink with modified independence within 7 day(s). 3.  Patient will perform lower body dressing with modified independence within 7 day(s). 4.  Patient will perform toilet transfers with modified independence within 7 day(s). 5.  Patient will perform all aspects of toileting with modified independence within 7 day(s). 6.  Patient will utilize energy conservation techniques during functional activities with verbal cues within 7 day(s). Outcome: Progressing Towards Goal   OCCUPATIONAL THERAPY TREATMENT  Patient: Julee Duffy (50 y.o. female)  Date: 4/8/2021  Diagnosis: CHF exacerbation ([facility] [address] [I50.9] <principal problem not specified>  Procedure(s) (LRB):  ANGIOGRAPHY RENAL BILAT (N/A)  Intravascular Ultrasound (N/A)  Pta Renal Visceral Artery (N/A) 3 Days Post-Op  Precautions: Fall  Chart, occupational therapy assessment, plan of care, and goals were reviewed. ASSESSMENT  Patient continues with skilled OT services and is progressing towards goals. Remains with fair endurance, decreased strength and activity tolerance. Overall CGA-SBA for all self care, simulated IADLs with SBA with RW use. Requires 2L O2 with activity, 89% on room air. Patient would benefir from short rehab stay to imrpove endurance in prep for return home alone. Recommend SNF then home with [facility].       Current Level of Function Impacting Discharge (ADLs): CGA to SBA, increased time    Other factors to consider for discharge: lives alone, 1st floor, not back at mod I baseline         PLAN :  Patient continues to benefit from skilled intervention to address the above impairments. Continue treatment per established plan of care to address goals. Recommend with staff: Merian Galeazzi for al lmeals    Recommend next OT session: standing ADLs, endurance    Recommendation for discharge: (in order for the patient to meet his/her long term goals)  Therapy up to 5 days/week in SNF setting or an intensive home health therapy program    This discharge recommendation:  Has been made in collaboration with the attending provider and/or case management    IF patient discharges home will need the following DME: patient owns DME required for discharge       SUBJECTIVE:   Patient stated I still feel weak.     OBJECTIVE DATA SUMMARY:   Cognitive/Behavioral Status:  Neurologic State: Alert; Appropriate for age  Orientation Level: Oriented X4  Cognition: Appropriate decision making             Functional Mobility and Transfers for ADLs:  Bed Mobility:  Supine to Sit: Contact guard assistance    Transfers:  Sit to Stand: Contact guard assistance  Functional Transfers  Toilet Transfer : Stand-by assistance  Bed to Chair: Contact guard assistance; Adaptive equipment; Additional time    Balance:  Sitting: Intact; With support  Standing: Impaired; With support  Standing - Static: Good;Constant support  Standing - Dynamic : Fair    ADL Intervention:  Feeding  Feeding Assistance: Set-up    Grooming  Grooming Assistance: Set-up    Upper Body Bathing  Bathing Assistance: Set-up    Lower Body Bathing  Bathing Assistance: Set-up    Upper Body Dressing Assistance  Dressing Assistance: Set-up    Lower Body Dressing Assistance  Dressing Assistance: Set-up  Socks: Stand-by assistance  Leg Crossed Method Used: Yes  Position Performed: Seated in chair    Toileting  Bladder Hygiene: Stand-by assistance  Clothing Management: Stand-by assistance         Therapeutic Exercises:   Patient instructed and indicated understanding the benefits of maintaining activity tolerance, functional mobility, and independence with self care tasks during acute stay  to ensure safe return home and to baseline. Encouraged patient to increase frequency and duration OOB, be out of bed for all meals, perform daily ADLs (as approved by RN/MD regarding bathing etc), and performing functional mobility to/from bathroom. encouraged OOB and full participation with ADLs to improve strength and endurance. Pain:      Activity Tolerance:   Fair, desaturates with exertion and requires oxygen, and requires frequent rest breaks    After treatment patient left in no apparent distress:   Sitting in chair and Call bell within reach    COMMUNICATION/COLLABORATION:   The patients plan of care was discussed with: Physical therapist, Registered nurse, and Case management.      Frandy Andrew OT  Time Calculation: 30 mins

## 2021-04-08 NOTE — PROGRESS NOTES
Problem: Mobility Impaired (Adult and Pediatric)  Goal: *Acute Goals and Plan of Care (Insert Text)  Description: FUNCTIONAL STATUS PRIOR TO ADMISSION: Patient was modified independent using a wheelchair and at times a RW for functional mobility. HOME SUPPORT PRIOR TO ADMISSION: The patient lived alone with an aide once a week for 4 hours to provide assistance. Physical Therapy Goals  Initiated 3/30/2021, revised 4/8 and continues to be appropriate, #5 added  1. Patient will move from supine to sit and sit to supine  and roll side to side in bed with modified independence within 7 day(s). 2.  Patient will transfer from bed to chair and chair to bed with modified independence using the least restrictive device within 7 day(s). 3.  Patient will perform sit to stand with modified independence within 7 day(s). 4.  Patient will ambulate with modified independence for 75 feet with the least restrictive device within 7 day(s). 5. Patient will tolerate sitting up in the bedside chair x 1 hours, 2-3 times per day within 7 days. Outcome: Progressing Towards Goal     PHYSICAL THERAPY TREATMENT: WEEKLY REASSESSMENT  Patient: Marty Noland (13 y.o. female)  Date: 4/8/2021  Primary Diagnosis: CHF exacerbation ([facility] [address] [I50.9]  Procedure(s) (LRB):  ANGIOGRAPHY RENAL BILAT (N/A)  Intravascular Ultrasound (N/A)  Pta Renal Visceral Artery (N/A) 3 Days Post-Op   Precautions:   Fall      ASSESSMENT  Patient continues with skilled PT services and is progressing towards goals. Patient able to get OOB with CGA and HOB elevated. She sat EOB x 8 mins unsupported. Able to stand and transfer to bedside chair with RW, needing verbal cues for sequencing and safety. Biggest limiting factor continues to be decreased activity tolerance, generalized weakness, and fear of falling. Patient educated on pacing techniques and deep breathing.  Patient then able to demonstrate ambulating in the room with RW x 15 feet, slow shuffled gait pattern. Verbal cues for safety during directional changes and managing O2 line. Concerned about home safety as Patient lives alone. Patient needs encouragement to be OOB for all meals and should be transferring to bedside commode for toileting as able. Pending insurance auth for SNF, will continue to follow. Patient's progression toward goals since last assessment: Tolerating up in chair x 30 minutes    Current Level of Function Impacting Discharge (mobility/balance): CGA, cues for safety    Functional Outcome Measure: The patient scored 14/28 on the Tinetti balance outcome measure which is indicative of high fall risk. Other factors to consider for discharge: Lives alone, minimal community support, no stairs, owns a wheelchair that she pushes with her feet         PLAN :  Goals have been updated based on progression since last assessment. Patient continues to benefit from skilled intervention to address the above impairments. Recommendations and Planned Interventions: bed mobility training, transfer training, gait training, therapeutic exercises, neuromuscular re-education, patient and family training/education, and therapeutic activities      Frequency/Duration: Patient will be followed by physical therapy:  5 times a week to address goals. Recommendation for discharge: (in order for the patient to meet his/her long term goals)  Therapy up to 5 days/week in SNF setting    This discharge recommendation:  Has been made in collaboration with the attending provider and/or case management    IF patient discharges home will need the following DME: bedside commode, has RW and wheelchair, would need w/c transport home         SUBJECTIVE:   Patient stated I get nervous.  It's why I'm shaky    OBJECTIVE DATA SUMMARY:   HISTORY:    Past Medical History:   Diagnosis Date    Anxiety disorder     Atrial fibrillation ([facility] [address]     CAD (coronary artery disease) 2007    stents, CABG x 3v    Carotid stenosis  Cervical stenosis of spinal canal 07/2019    Chronic kidney disease     Cough     CVA (cerebral vascular accident) ([facility] [address] 07/2019    left lacunar infarct at head of caudate    Depression     AND CHRONIC ANXIETY    Diabetes (HCC)     GERD (gastroesophageal reflux disease)     High cholesterol     History of peptic ulcer     Bleeding ulcer with increased NSAID use    Hypertension     Left carotid stenosis 07/2019    s/p left CEA with Dr. Kalin Mcgraw, old 2007    PUD (peptic ulcer disease)     Stroke ([facility] [address]     Tremor     Valvular heart disease      Past Surgical History:   Procedure Laterality Date    CARDIAC SURG PROCEDURE UNLIST      CABG X3 VESSEL    HX CAROTID ENDARTERECTOMY  07/2019    HX CORONARY ARTERY BYPASS GRAFT      HX TONSILLECTOMY  1963    TOTAL KNEE ARTHROPLASTY Right 2015       Personal factors and/or comorbidities impacting plan of care: anxiety, SOB, cardiac history    Home Situation  Home Environment: Apartment  # Steps to Enter: 0  One/Two Story Residence: One story  Living Alone: Yes  Support Systems: None  Patient Expects to be Discharged to[de-identified] Patient room  Current DME Used/Available at Home: Nery Currie    EXAMINATION/PRESENTATION/DECISION MAKING:   Critical Behavior:  Neurologic State: Alert, Appropriate for age  Orientation Level: Oriented X4  Cognition: Appropriate decision making  Safety/Judgement: Decreased insight into deficits, Decreased awareness of need for safety  Hearing: Auditory  Auditory Impairment: Hard of hearing, bilateral    Functional Mobility:  Bed Mobility:  Supine to Sit: Contact guard assistance     Transfers:  Sit to Stand: Contact guard assistance  Stand to Sit: Contact guard assistance  Bed to Chair: Contact guard assistance; Adaptive equipment; Additional time    Balance:   Sitting: Intact; With support  Standing: Impaired; With support  Standing - Static: Good;Constant support  Standing - Dynamic : Fair    Ambulation/Gait Training:  Distance (ft): 15 Feet (ft)  Assistive Device: Gait belt;Walker, rolling  Ambulation - Level of Assistance: Contact guard assistance; Adaptive equipment; Additional time  Gait Abnormalities: Decreased step clearance; Step to gait  Base of Support: Widened  Speed/Samira: Slow;Shuffled  Step Length: Right shortened;Left shortened  Interventions: Safety awareness training; Tactile cues; Verbal cues      Functional Measure:  Tinetti test:    Sitting Balance: 1  Arises: 1  Attempts to Rise: 1  Immediate Standing Balance: 1  Standing Balance: 1  Nudged: 1  Eyes Closed: 0  Turn 360 Degrees - Continuous/Discontinuous: 0  Turn 360 Degrees - Steady/Unsteady: 1  Sitting Down: 1  Balance Score: 8 Balance total score  Indication of Gait: 0  R Step Length/Height: 0  L Step Length/Height: 0  R Foot Clearance: 1  L Foot Clearance: 1  Step Symmetry: 1  Step Continuity: 1  Path: 1  Trunk: 0  Walking Time: 1  Gait Score: 6 Gait total score  Total Score: 14/28 Overall total score         Tinetti Tool Score Risk of Falls  <19 = High Fall Risk  19-24 = Moderate Fall Risk  25-28 = Low Fall Risk  Tinetti ME. Performance-Oriented Assessment of Mobility Problems in Elderly Patients. Ambriz 66; [id number].  (Scoring Description: PT Bulletin Feb. 10, 1993)    Older adults: Yordy Emanuel et al, 2009; n = 1000 [facility] elderly evaluated with ABC, KATJA, ADL, and IADL)  · Mean KATJA score for males aged 69-68 years = 26.21(3.40)  · Mean KATJA score for females age 69-68 years = 25.16(4.30)  · Mean KATJA score for males over 80 years = 23.29(6.02)  · Mean KATJA score for females over 80 years = 17.20(8.32)          Pain Rating:  No pain reported during treatment    Activity Tolerance:   Fair, desaturates with exertion and requires oxygen, requires rest breaks, and observed SOB with activity    After treatment patient left in no apparent distress:   Sitting in chair and Call bell within reach    COMMUNICATION/EDUCATION:   The patients plan of care was discussed with: Occupational therapist, Registered nurse, and Case management. Fall prevention education was provided and the patient/caregiver indicated understanding., Patient/family have participated as able in goal setting and plan of care. , and Patient/family agree to work toward stated goals and plan of care.     Thank you for this referral.  Padma Cloud, PT, DPT  Geriatric Clinical Specialist     Time Calculation: 32 mins

## 2021-04-08 NOTE — PROGRESS NOTES
[address] [facility] Adult  Hospitalist Group                                                                                          Hospitalist Progress Note  Albert Dougherty MD  Answering service: [phone] OR [phone] from in house phone        Date of Service:  2021  NAME:  Rosana Gutierrez  :  1940  MRN:  [id number]      Admission Summary:   79year old female patient with PMH of CAD, CHF, Afib not on AC, CKD, HTN, parkinson's presented to ED for evaluation of sob since yesterday. Interval history / Subjective:     Slowly improving. Denies any SOB today. Still very weak. Able to get to the chair yesterday. Assessment & Plan:     Acute systolic CHF, ischemic - NYHA class III  SOB  - EF 20-25%  - Continue bumex, hydralazine, imdur, coreg, statin   - Cardiology following  - I and O and daily weights  - VQ scan low probability     CAD - s/p [facility]  with STEPHAN to LAD  - On DAPT  - On BB, imdur, statin   - Cards following  - LV thrombus ruled out    Afib with RVR  - On coreg  - Not on AC due to anemia, does qualify and should consider starting o/p    MORENO on CKD stage 3  - Nephrology following  - Continue diuretics and monitor     BATOOL - mild, ~50% bilaterally on angio  Aortic aneurysm - incidental. 3.9cm, outpatient followup  UTI with ESBL - continue meropenem, plan 7 day course, last day tomorrow     Hypertension  -Labile blood pressure, hypotensive episode but responded to saline bolus  -Continue Coreg, isosorbide and hydralazine     Parkinson disease   -Continue Sinemet     Anemia  -Anemia of chronic kidney disease  -S/p transfusion of 1 unit PRBCs   -Monitor H&H     Insomnia  -Ambien as needed.     Code status: DNR  DVT prophylaxis: SCDs    Care Plan discussed with: Patient/Family  Anticipated Disposition: SNF/LTC  Anticipated Discharge: Less than 24 hours     Hospital Problems  Date Reviewed: 2020          Codes Class Noted POA    CHF exacerbation ([facility] [address] ICD-10-CM: I50.9  ICD-9-CM: 428.0  9/25/2020 Unknown                Review of Systems:   A comprehensive review of systems was negative except for that written in the HPI. Vital Signs:    Last 24hrs VS reviewed since prior progress note. Most recent are:  Visit Vitals  /68 (BP 1 Location: Left upper arm, BP Patient Position: At rest)   Pulse 73   Temp 98.4 °F (36.9 °C)   Resp 18   Ht 5' 5\" (1.651 m)   Wt 72.3 kg (159 lb 4.8 oz)   SpO2 93%   BMI 26.51 kg/m²         Intake/Output Summary (Last 24 hours) at 4/8/2021 1647  Last data filed at 4/8/2021 1559  Gross per 24 hour   Intake    Output 1600 ml   Net -1600 ml        Physical Examination:     I had a face to face encounter with this patient and independently examined them on 4/8/2021 as outlined below:          Constitutional:  No acute distress, cooperative, pleasant    ENT:  Oral mucosa moist, oropharynx benign. Resp:  CTA bilaterally. No wheezing/rhonchi/rales. No accessory muscle use   CV:  Irregularly irregular rhythm, normal rate, no murmurs, gallops, rubs    GI:  Soft, non distended, non tender. normoactive bowel sounds, no hepatosplenomegaly     Musculoskeletal:  No edema, warm, 2+ pulses throughout    Neurologic:  Moves all extremities.   AAOx3, CN II-XII reviewed     Skin:  Good turgor, no rashes or ulcers       Data Review:    Review and/or order of clinical lab test  Review and/or order of tests in the radiology section of CPT  Review and/or order of tests in the medicine section of CPT      Labs:     Recent Labs     04/08/21 0144 04/07/21 0612   WBC 7.7 6.1   HGB 8.3* 8.2*   HCT 25.7* 25.8*    185     Recent Labs     04/08/21  0144 04/07/21  0612 04/06/21  1857 04/05/21 1922 04/05/21 1922    138 140   < > 141   K 4.4 4.3 4.0   < > 3.6    109* 112*   < > 112*   CO2 21 24 23   < > 23   BUN 25* 25* 24*   < > 22*   CREA 1.47* 1.31* 1.22*   < > 1.44*   * 96 128*   < > 135*   CA 9.1 8.8 8.3*   < > 8.7   MG  --   --  1.9  -- 1. 7   PHOS 3.7 3.3  --   --   --     < > = values in this interval not displayed. Recent Labs     04/08/21  0144 04/07/21  0612 04/06/21  1857 04/05/21 1922   ALT  --   --  8* <6*   AP  --   --  58 66   TBILI  --   --  0.3 0.6   TP  --   --  4.9* 5.5*   ALB 2.9* 2.8* 2.5* 2.9*   GLOB  --   --  2.4 2.6     Recent Labs     04/05/21 1922   APTT 25.6      No results for input(s): FE, TIBC, PSAT, FERR in the last 72 hours. Lab Results   Component Value Date/Time    Folate 19.7 03/31/2021 03:55 AM      No results for input(s): PH, PCO2, PO2 in the last 72 hours.   Recent Labs     04/05/21 1922   TROIQ 0.34*     Lab Results   Component Value Date/Time    Cholesterol, total 148 09/23/2020 04:27 AM    HDL Cholesterol 52 09/23/2020 04:27 AM    LDL, calculated 83.8 09/23/2020 04:27 AM    Triglyceride 61 09/23/2020 04:27 AM    CHOL/HDL Ratio 2.8 09/23/2020 04:27 AM     Lab Results   Component Value Date/Time    Glucose (POC) 138 (H) 04/02/2021 11:45 AM    Glucose (POC) 107 (H) 04/01/2021 09:16 PM    Glucose (POC) 219 (H) 03/31/2021 05:19 AM    Glucose (POC) 146 (H) 10/02/2020 10:28 AM    Glucose (POC) 181 (H) 10/02/2020 10:04 AM     Lab Results   Component Value Date/Time    Color YELLOW/STRAW 03/30/2021 11:02 AM    Appearance CLOUDY (A) 03/30/2021 11:02 AM    Specific gravity 1.008 03/30/2021 11:02 AM    pH (UA) 5.0 03/30/2021 11:02 AM    Protein 30 (A) 03/30/2021 11:02 AM    Glucose Negative 03/30/2021 11:02 AM    Ketone Negative 03/30/2021 11:02 AM    Bilirubin Negative 03/30/2021 11:02 AM    Urobilinogen 0.2 03/30/2021 11:02 AM    Nitrites Negative 03/30/2021 11:02 AM    Leukocyte Esterase LARGE (A) 03/30/2021 11:02 AM    Epithelial cells FEW 03/30/2021 11:02 AM    Bacteria 4+ (A) 03/30/2021 11:02 AM    WBC >100 (H) 03/30/2021 11:02 AM    RBC 5-10 03/30/2021 11:02 AM         Medications Reviewed:     Current Facility-Administered Medications   Medication Dose Route Frequency    melatonin tablet 3 mg  3 mg Oral QHS PRN    traMADoL (ULTRAM) tablet 50 mg  50 mg Oral Q6H PRN    carvediloL (COREG) tablet 3.125 mg  3.125 mg Oral BID WITH MEALS    hydrALAZINE (APRESOLINE) tablet 25 mg  25 mg Oral TID    bumetanide (BUMEX) tablet 1 mg  1 mg Oral DAILY    polyethylene glycol (MIRALAX) packet 17 g  17 g Oral DAILY    0.9% sodium chloride infusion 250 mL  250 mL IntraVENous PRN    isosorbide mononitrate ER (IMDUR) tablet 30 mg  30 mg Oral DAILY    zolpidem (AMBIEN) tablet 5 mg  5 mg Oral QHS PRN    meropenem (MERREM) 500 mg in 0.9% sodium chloride (MBP/ADV) 50 mL MBP  0.5 g IntraVENous Q8H    sodium chloride (NS) flush 5-40 mL  5-40 mL IntraVENous PRN    sodium chloride (NS) flush 5-40 mL  5-40 mL IntraVENous Q8H    sodium chloride (NS) flush 5-40 mL  5-40 mL IntraVENous PRN    acetaminophen (TYLENOL) tablet 650 mg  650 mg Oral Q6H PRN    Or    acetaminophen (TYLENOL) suppository 650 mg  650 mg Rectal Q6H PRN    promethazine (PHENERGAN) tablet 12.5 mg  12.5 mg Oral Q6H PRN    Or    ondansetron (ZOFRAN) injection 4 mg  4 mg IntraVENous Q6H PRN    aspirin delayed-release tablet 81 mg  81 mg Oral QHS    atorvastatin (LIPITOR) tablet 40 mg  40 mg Oral QHS    carbidopa-levodopa (SINEMET)  mg per tablet 2 Tab  2 Tab Oral QID    clopidogreL (PLAVIX) tablet 75 mg  75 mg Oral DAILY AFTER BREAKFAST    DULoxetine (CYMBALTA) capsule 120 mg  120 mg Oral DAILY    pantoprazole (PROTONIX) tablet 40 mg  40 mg Oral ACB    hydrALAZINE (APRESOLINE) 20 mg/mL injection 20 mg  20 mg IntraVENous Q6H PRN     ______________________________________________________________________  EXPECTED LENGTH OF STAY: 3d 21h  ACTUAL LENGTH OF STAY:          10                 Rafy Drake MD

## 2021-04-08 NOTE — PROGRESS NOTES
Transition of Care: TBD; SNF: [facility] has accepted as of 4/8; [facility] of 4/8; called [phone];  reference #[id number]     [address] with [address] with Charlie Gurrola 1: TBD; likely wheelchair van or BLS    RUR: 30%    Discharge pending:  -progress with PT/OT  -pending medical progress and care recommendations    0940:  [address] has denied referral (no beds available)     0940: this CM called Pat at [facility] to f/u on referral; no answer; left message    1000: this CM met with patient to get another SNF choice; she does not want to go to a SNF; she wants to go home with [facility]; patient states she has a wheelchair at home, walker, commode chair; patient is already open with [facility]; will need SUMAN order    1130: Pat at [facility] has called back; patient is accepted at this time  1145: this CM met with patient again at bedside; she agrees to go to [facility] if insurance approves    1430: this CM called [facility] and started insurance auth; called [phone]; talked to [facility]; reference #[id number] and faxed all the clinicals to [phone] (juarez, QUINTIN and P, MD clinicals, PT/OT notes)    CM following  Eb Wilburn RN, CRM

## 2021-04-09 VITALS
BODY MASS INDEX: 26.54 KG/M2 | DIASTOLIC BLOOD PRESSURE: 67 MMHG | HEART RATE: 73 BPM | TEMPERATURE: 98.4 F | OXYGEN SATURATION: 96 % | WEIGHT: 159.3 LBS | HEIGHT: 65 IN | SYSTOLIC BLOOD PRESSURE: 149 MMHG | RESPIRATION RATE: 17 BRPM

## 2021-04-09 PROBLEM — N39.0 UTI (URINARY TRACT INFECTION): Status: ACTIVE | Noted: 2021-04-09

## 2021-04-09 LAB
ALBUMIN SERPL-MCNC: 3 G/DL (ref 3.5–5)
ANION GAP SERPL CALC-SCNC: 6 MMOL/L (ref 5–15)
ANION GAP SERPL CALC-SCNC: 8 MMOL/L (ref 5–15)
BASOPHILS # BLD: 0.1 K/UL (ref 0–0.1)
BASOPHILS NFR BLD: 1 % (ref 0–1)
BUN SERPL-MCNC: 20 MG/DL (ref 6–20)
BUN SERPL-MCNC: 20 MG/DL (ref 6–20)
BUN/CREAT SERPL: 17 (ref 12–20)
BUN/CREAT SERPL: 18 (ref 12–20)
CALCIUM SERPL-MCNC: 9.2 MG/DL (ref 8.5–10.1)
CALCIUM SERPL-MCNC: 9.3 MG/DL (ref 8.5–10.1)
CHLORIDE SERPL-SCNC: 107 MMOL/L (ref 97–108)
CHLORIDE SERPL-SCNC: 108 MMOL/L (ref 97–108)
CO2 SERPL-SCNC: 23 MMOL/L (ref 21–32)
CO2 SERPL-SCNC: 23 MMOL/L (ref 21–32)
CREAT SERPL-MCNC: 1.14 MG/DL (ref 0.55–1.02)
CREAT SERPL-MCNC: 1.16 MG/DL (ref 0.55–1.02)
DIFFERENTIAL METHOD BLD: ABNORMAL
EOSINOPHIL # BLD: 0.3 K/UL (ref 0–0.4)
EOSINOPHIL NFR BLD: 5 % (ref 0–7)
ERYTHROCYTE [DISTWIDTH] IN BLOOD BY AUTOMATED COUNT: 17.1 % (ref 11.5–14.5)
GLUCOSE SERPL-MCNC: 101 MG/DL (ref 65–100)
GLUCOSE SERPL-MCNC: 102 MG/DL (ref 65–100)
HCT VFR BLD AUTO: 24.6 % (ref 35–47)
HGB BLD-MCNC: 8 G/DL (ref 11.5–16)
IMM GRANULOCYTES # BLD AUTO: 0 K/UL (ref 0–0.04)
IMM GRANULOCYTES NFR BLD AUTO: 1 % (ref 0–0.5)
LYMPHOCYTES # BLD: 0.9 K/UL (ref 0.8–3.5)
LYMPHOCYTES NFR BLD: 15 % (ref 12–49)
MAGNESIUM SERPL-MCNC: 1.9 MG/DL (ref 1.6–2.4)
MCH RBC QN AUTO: 30.5 PG (ref 26–34)
MCHC RBC AUTO-ENTMCNC: 32.5 G/DL (ref 30–36.5)
MCV RBC AUTO: 93.9 FL (ref 80–99)
MONOCYTES # BLD: 0.7 K/UL (ref 0–1)
MONOCYTES NFR BLD: 12 % (ref 5–13)
NEUTS SEG # BLD: 4.1 K/UL (ref 1.8–8)
NEUTS SEG NFR BLD: 66 % (ref 32–75)
NRBC # BLD: 0 K/UL (ref 0–0.01)
NRBC BLD-RTO: 0 PER 100 WBC
PHOSPHATE SERPL-MCNC: 2.8 MG/DL (ref 2.6–4.7)
PHOSPHATE SERPL-MCNC: 2.9 MG/DL (ref 2.6–4.7)
PLATELET # BLD AUTO: 197 K/UL (ref 150–400)
PMV BLD AUTO: 8.9 FL (ref 8.9–12.9)
POTASSIUM SERPL-SCNC: 3.8 MMOL/L (ref 3.5–5.1)
POTASSIUM SERPL-SCNC: 3.9 MMOL/L (ref 3.5–5.1)
RBC # BLD AUTO: 2.62 M/UL (ref 3.8–5.2)
SODIUM SERPL-SCNC: 137 MMOL/L (ref 136–145)
SODIUM SERPL-SCNC: 138 MMOL/L (ref 136–145)
WBC # BLD AUTO: 6.1 K/UL (ref 3.6–11)

## 2021-04-09 PROCEDURE — 36415 COLL VENOUS BLD VENIPUNCTURE: CPT

## 2021-04-09 PROCEDURE — 85025 COMPLETE CBC W/AUTO DIFF WBC: CPT

## 2021-04-09 PROCEDURE — 74011000258 HC RX REV CODE- 258: Performed by: FAMILY MEDICINE

## 2021-04-09 PROCEDURE — 74011250637 HC RX REV CODE- 250/637: Performed by: INTERNAL MEDICINE

## 2021-04-09 PROCEDURE — 84100 ASSAY OF PHOSPHORUS: CPT

## 2021-04-09 PROCEDURE — 80069 RENAL FUNCTION PANEL: CPT

## 2021-04-09 PROCEDURE — 74011250637 HC RX REV CODE- 250/637: Performed by: NURSE PRACTITIONER

## 2021-04-09 PROCEDURE — 83735 ASSAY OF MAGNESIUM: CPT

## 2021-04-09 PROCEDURE — 74011250636 HC RX REV CODE- 250/636: Performed by: FAMILY MEDICINE

## 2021-04-09 PROCEDURE — 80048 BASIC METABOLIC PNL TOTAL CA: CPT

## 2021-04-09 RX ORDER — BUMETANIDE 1 MG/1
1 TABLET ORAL DAILY
Qty: 30 TAB | Refills: 0 | Status: SHIPPED | OUTPATIENT
Start: 2021-04-10 | End: 2021-04-30

## 2021-04-09 RX ORDER — POLYETHYLENE GLYCOL 3350 17 G/17G
17 POWDER, FOR SOLUTION ORAL DAILY
Qty: 30 PACKET | Refills: 0 | Status: SHIPPED
Start: 2021-04-10 | End: 2021-05-10

## 2021-04-09 RX ORDER — LEVOFLOXACIN 500 MG/1
500 TABLET, FILM COATED ORAL DAILY
Qty: 2 TAB | Refills: 0 | Status: SHIPPED
Start: 2021-04-09 | End: 2021-04-11

## 2021-04-09 RX ORDER — ISOSORBIDE MONONITRATE 30 MG/1
30 TABLET, EXTENDED RELEASE ORAL DAILY
Qty: 30 TAB | Refills: 0 | Status: SHIPPED | OUTPATIENT
Start: 2021-04-10 | End: 2021-05-10

## 2021-04-09 RX ADMIN — CARVEDILOL 3.12 MG: 3.12 TABLET, FILM COATED ORAL at 07:05

## 2021-04-09 RX ADMIN — CLOPIDOGREL BISULFATE 75 MG: 75 TABLET ORAL at 09:39

## 2021-04-09 RX ADMIN — Medication 10 ML: at 12:25

## 2021-04-09 RX ADMIN — PANTOPRAZOLE SODIUM 40 MG: 40 TABLET, DELAYED RELEASE ORAL at 07:06

## 2021-04-09 RX ADMIN — CARBIDOPA AND LEVODOPA 2 TABLET: 25; 100 TABLET ORAL at 09:41

## 2021-04-09 RX ADMIN — CARBIDOPA AND LEVODOPA 2 TABLET: 25; 100 TABLET ORAL at 12:24

## 2021-04-09 RX ADMIN — HYDRALAZINE HYDROCHLORIDE 25 MG: 25 TABLET, FILM COATED ORAL at 09:39

## 2021-04-09 RX ADMIN — MEROPENEM 500 MG: 500 INJECTION, POWDER, FOR SOLUTION INTRAVENOUS at 02:52

## 2021-04-09 RX ADMIN — DULOXETINE HYDROCHLORIDE 120 MG: 60 CAPSULE, DELAYED RELEASE ORAL at 09:41

## 2021-04-09 RX ADMIN — Medication 10 ML: at 07:05

## 2021-04-09 RX ADMIN — Medication 3 MG: at 01:09

## 2021-04-09 RX ADMIN — MEROPENEM 500 MG: 500 INJECTION, POWDER, FOR SOLUTION INTRAVENOUS at 12:24

## 2021-04-09 RX ADMIN — ISOSORBIDE MONONITRATE 30 MG: 30 TABLET, EXTENDED RELEASE ORAL at 09:39

## 2021-04-09 NOTE — PROGRESS NOTES
Transition of Care:  SNF: [facility] SNF has accepted as of 4/8; [facility] [address] [facility] [address] has been approved as of 4/9  called [phone];  reference #[id number]      Transport Plan: [facility] [place] today     RUR: 28%          3028-1353: this CM called shreyas to f/u on auth; talked to [facility]; insurance has been authorized at this time; ID# [id number] start date is 4/9 and next review date is 4/13; Natasha Colunga is the CM and fax is [phone]; this CM informed attending; she verbalized understanding; this CM called Pat at [facility]; she verbalized understanding; patient will go to room 122 and report number is [phone]; CM informed patient at bedside; she verbalized understanding; this CM offered to call patients niece to inform; she declined saying her niece was in [place] on spring break; ([facility] is booked and delayed) called [facility]; talked to Krysta Rizzo; they can transport at 3pm today; ambulance and SNF packet on patients chart     Medicare pt has received, reviewed, and signed 2nd IM letter informing them of their right to appeal the discharge. Signed copy has been placed on pt bedside chart. 1330: this CM faxed dc summary to [address] [facility] to SNF/Rehab    SNF/Rehab Transition:  Patient has been accepted to [facility] and meets criteria for admission. Patient will transported by [facility] and expected to leave at 3pm    Communication to Patient/Family:  Met with patient and is agreeable to the transition plan. Communication to SNF/Rehab:  Bedside RN, Sal Samano has been notified to update the transition plan to the facility and call report (phone number [phone]). Discharge information has been updated on the AVS.     Discharge instructions to be fax'd to facility at [facility] # [phone]). Nursing Please include all hard scripts for controlled substances, med rec and dc summary, and AVS in packet.      Reviewed and confirmed with facility, [facility], can manage the patient care needs for the following:     SNF/Rehab Transition:  Patient to follow-up with Home Health:  PCP/Specialist:     Reviewed and confirmed with facility, [facility] they can manage the patient care needs for the following:     Kristen Ruiz with (X) only those applicable:    Medication:  [x]  Medications will be available at the facility  []  IV Antibiotics   []  Controlled Substance - hard copy to be sent with patient   []  Weekly Labs   Documents:  [] Hard RX  [x] MAR  [x] Kardex  [x] AVS  [x]Transfer Summary  [x]Discharge   Equipment:  []  CPAP/BiPAP  []  Wound Vacuum  []  Sue or Urinary Device  []  PICC/Central Line  []  Nebulizer  []  Ventilator   Treatment:  []Isolation (for MRSA, VRE, etc.)  []Surgical Drain Management  []Tracheostomy Care  []Dressing Changes  []Dialysis with transportation and chair time  []PEG Care  []Oxygen  [x]Daily Weights for Heart Failure   Dietary:  []Any diet limitations  []Tube Feedings   []Total Parenteral Management (TPN)   Eligible for Medicaid Long Term Services and Supports  Yes:  [] Eligible for medical assistance or will become eligible within 180 days and UAI completed. [] Provider/Patient and/or support system has requested screening. [] UAI copy provided to patient or responsible party,   [] UAI unavailable at discharge will send once processed to SNF provider. [] UAI unavailable at discharged mailed to patient  No:   [x] Private pay and is not financially eligible for Medicaid within the next 180 days. [] Reside out-of-state.   [] A residents of a state owned/operated facility that is licensed  by [place] [address] [employer] and [employer] or [facility]  [] Enrollment in [facility] hospice services  []  [address]  [] Patient /Family declines to have screening completed or provide financial information for screening     Financial Resources:  Medicaid    [] Initiated and application pending   [] Full coverage     Advanced Care Plan:  []Surrogate Decision Maker of Care  []POA  []Communicated Code Status  (DDNR\", \"Full\")    Other     Financial Resources:  Medicaid    [] Initiated and application pending   [] Full coverage     Advanced Care Plan:  []Surrogate Decision Maker of Care  []POA  []Communicated Code Status (DDNR\", \"Full\")    Other          CM following  Grabiel Alonso RN, CRM

## 2021-04-09 NOTE — DISCHARGE INSTRUCTIONS
Discharge Summary       PATIENT ID: Summer Fabian  MRN: [id number]   YOB: 1940    DATE OF ADMISSION: 3/29/2021 11:53 AM    DATE OF DISCHARGE: 04/09/2021   PRIMARY CARE PROVIDER: Bruno Giraldo DO     DISCHARGING PROVIDER: Tee Pierre MD    To contact this individual call [phone] and ask the  to page. If unavailable ask to be transferred the Adult Hospitalist Department. CONSULTATIONS: IP CONSULT TO HOSPITALIST  IP CONSULT TO CARDIOLOGY  IP CONSULT TO NEPHROLOGY  IP CONSULT TO INFECTIOUS DISEASES    PROCEDURES/SURGERIES: Procedure(s):  ANGIOGRAPHY RENAL BILAT  Intravascular Ultrasound  Pta Renal Visceral Artery    ADMITTING DIAGNOSES & HOSPITAL COURSE:   Acute systolic CHF   CAD s/p STEPHAN to LAD  Atrial fibrillation with RVR  MORENO on CKD stage III  UTI with ESBL    79year old female patient with PMH of CAD, CHF, Afib not on AC, CKD, HTN, parkinson's presented to ED for evaluation of sob. Patient was found to have acute systolic heart failure exacerbation, echocardiogram revealed EF of 20%, new onset. Cardiology was consulted, she underwent VQ scan admission which had low probability for PE. She was started on diuresis with improvement. Additionally had atrial fibrillation with rapid ventricular response on admission started on beta-blockers with improvement. She underwent left heart cath on 4/1 which showed occlusion in her LAD. She is status post STEPHAN placement. Initially there was concern for LV thrombus on her transthoracic echocardiogram, however this was now ruled out. Patient had concerning findings on CT of the abdomen for potential renal artery stenosis, therefore had an angiogram which afterwards revealed mild stenosis about 50% bilaterally. Her hospital stay was also complicated by UTI, which was ESBL, sensitive to Levaquin and meropenem. She will need to complete an additional 2 days of antibiotics.   She was started on goal-directed medical therapy by cardiology, will need close follow-up. DISCHARGE DIAGNOSES / PLAN:      Acute systolic CHF, ischemic - NYHA class III  SOB  - EF 20-25%  - Continue bumex, hydralazine, imdur, coreg, statin   - Cardiology following  - I and O and daily weights  - VQ scan low probability      CAD - s/p [facility] 4/1 with STEPHAN to LAD  - On DAPT  - On BB, imdur, statin   - Cards following  - LV thrombus ruled out     Afib with RVR  - On coreg  - Not on AC due to anemia, does qualify and should consider starting o/p     MORENO on CKD stage 3  - Nephrology following  - Continue diuretics and monitor      BATOOL - mild, ~50% bilaterally on angio  Aortic aneurysm - incidental. 3.9cm, outpatient followup  UTI with ESBL - continue meropenem, plan 7 day course, last day tomorrow 4/9     Hypertension  -Labile blood pressure, hypotensive episode but responded to saline bolus  -Continue Coreg, isosorbide and hydralazine     Parkinson disease   -Continue Sinemet     Anemia  -Anemia of chronic kidney disease  -S/p transfusion of 1 unit PRBCs 4/6  -Monitor H&H     Insomnia  -Ambien as needed.        ADDITIONAL CARE RECOMMENDATIONS:   - Please take all medications as prescribed. Note changes as below. **start imdur, bumex  **Continue antibiotics with levofloxacin for an additional 2 days for UTI  - Please make sure to follow up with your primary care physician within 1-2 weeks of discharge for hospital follow up. You also need to follow up with a cardiologist within 1 week of discharge. If your ejection fraction does not continue to improve, you may need a ICD device in the future. - Please make sure to continue to monitor for: Worsening shortness of breath, increased weight gain, increased chest pain and return to the Emergency Department with any of these symptoms:   - Please get up slowly from a seated or laying position, avoid falls. - Avoid tobacco, alcohol and other illicit drug use.          PENDING TEST RESULTS:   At the time of discharge the following test results are still pending: None    FOLLOW UP APPOINTMENTS:    Follow-up Information     Follow up With Specialties Details Why Contact Info Additional Information    [address] Cardiac Rehabilitation Call if interested in outpatient cardiac rehab once all home health/inpatient therapies are completed [address] [facility],4Th Floor [address] [facility], [address]. Please arrive 15 minutes prior to your appointment time and you will register in the [place] [address], [address], on the first floor of the [address]. Telephone: [phone] Fax: [phone] Driving directions To [facility] and Vascular [place]. Building: Driving WEST on [address], take exit [address] to [employer]. Turn left onto [facility] [address], then turn right into [employer] Parking lot Driving EAST on [address], take exit [address] [facility]. Turn right at the end of the exit ramp. Turn left onto [facility] [address], then turn right into [employer] [place] lot. [address] Call As needed for questions related to home health services [address]  [phone]              DIET: Cardiac Diet    ACTIVITY: PT/OT Eval and Treat    WOUND CARE: None     EQUIPMENT needed: Per PT/OT      DISCHARGE MEDICATIONS:  Current Discharge Medication List      START taking these medications    Details   bumetanide (BUMEX) 1 mg tablet Take 1 Tab by mouth daily for 30 days. Qty: 30 Tab, Refills: 0      isosorbide mononitrate ER (IMDUR) 30 mg tablet Take 1 Tab by mouth daily for 30 days. Qty: 30 Tab, Refills: 0      polyethylene glycol (MIRALAX) 17 gram packet Take 1 Packet by mouth daily for 30 days. Qty: 30 Packet, Refills: 0      levoFLOXacin (Levaquin) 500 mg tablet Take 1 Tab by mouth daily for 2 days.   Qty: 2 Tab, Refills: 0         CONTINUE these medications which have NOT CHANGED    Details carvediloL (COREG) 3.125 mg tablet Take 1 Tab by mouth two (2) times daily (with meals). Qty: 1 Tab, Refills: 0      hydrALAZINE (APRESOLINE) 25 mg tablet Take 1.5 Tabs by mouth three (3) times daily. Qty: 1 Tab, Refills: 0      naloxone (NARCAN) 4 mg/actuation nasal spray Use 1 spray intranasally, then discard. Repeat with new spray every 2 min as needed for opioid overdose symptoms, alternating nostrils. Qty: 1 Each, Refills: 0      atorvastatin (LIPITOR) 40 mg tablet Take 40 mg by mouth nightly. nitroglycerin (Nitrostat) 0.4 mg SL tablet 0.4 mg by SubLINGual route every five (5) minutes as needed for Chest Pain. Up to 3 doses. aspirin delayed-release (Ecotrin Low Strength) 81 mg tablet Take 81 mg by mouth nightly. clopidogreL (Plavix) 75 mg tab Take 75 mg by mouth daily (after breakfast). carbidopa-levodopa (SINEMET)  mg per tablet Take 2 Tabs by mouth four (4) times daily. Qty: 720 Tab, Refills: 1      acetaminophen (TYLENOL) 325 mg tablet Take 650 mg by mouth every six (6) hours as needed for Pain or Fever. cyanocobalamin (VITAMIN B12) 100 mcg tablet Take 100 mcg by mouth daily. vit C,O-Bd-gdtrx-lutein-zeaxan (PRESERVISION AREDS-2) 141-840-52-1 mg-unit-mg-mg cap capsule Take 1 Cap by mouth two (2) times a day. senna-docusate (SENNA WITH DOCUSATE SODIUM) 8.6-50 mg per tablet Take 1 Tab by mouth nightly. Cholecalciferol, Vitamin D3, 1,000 unit cap Take 1,000 Units by mouth daily. pantoprazole (PROTONIX) 40 mg tablet Take 40 mg by mouth Daily (before breakfast). Indications: h/o bleeding ulcer with nsaid      DULoxetine (CYMBALTA) 60 mg capsule Take 120 mg by mouth daily. Indications: Anxiousness associated with Depression      multivitamins-minerals-lutein (CENTRUM SILVER) tab tablet Take 1 Tab by mouth daily.          STOP taking these medications       amLODIPine (NORVASC) 5 mg tablet Comments:   Reason for Stopping:         furosemide (Lasix) 20 mg tablet Comments:   Reason for Stopping:                 NOTIFY YOUR PHYSICIAN FOR ANY OF THE FOLLOWING:   Fever over 101 degrees for 24 hours. Chest pain, shortness of breath, fever, chills, nausea, vomiting, diarrhea, change in mentation, falling, weakness, bleeding. Severe pain or pain not relieved by medications. Or, any other signs or symptoms that you may have questions about.     DISPOSITION:    Home With:   OT  PT  HH  RN      X  SNF    Independent/assisted living    Hospice    Other:       PATIENT CONDITION AT DISCHARGE:     Functional status   X Poor     Deconditioned     Independent      Cognition    X Lucid     Forgetful     Dementia      Catheters/lines (plus indication)    Sue     PICC     PEG    X None      Code status     Full code    X DNR      PHYSICAL EXAMINATION AT DISCHARGE:  Visit Vitals  BP (!) 159/65 (BP 1 Location: Left arm, BP Patient Position: At rest)   Pulse 69   Temp 98.2 °F (36.8 °C)   Resp 17   Ht 5' 5\" (1.651 m)   Wt 72.3 kg (159 lb 4.8 oz)   SpO2 95%   BMI 26.51 kg/m²     Gen: NAD, awake in bed  HEENT: NC/AT, sclera anicteric, PERRL, EOMI  CV: RRR no m/r/g, normal S1 and S2, no pedal edema   Resp: CTA b/l no increased work of breathing, no wheezing or rhonchi, speaking in full sentences   Abd: NT/ND, normal bowel sounds, no rebound or guarding  Ext: 2+ pulses, no edema  Skin: No rashes or lesions      CHRONIC MEDICAL DIAGNOSES:  Problem List as of 4/9/2021 Date Reviewed: 5/1/2020          Codes Class Noted - Resolved    CHF exacerbation ([facility] ICD-10-CM: I50.9  ICD-9-CM: 428.0  9/25/2020 - Present        CHF (congestive heart failure) ([facility].) ICD-10-CM: I50.9  ICD-9-CM: 428.0  9/22/2020 - Present        Weakness ICD-10-CM: R53.1  ICD-9-CM: 780.79  5/4/2020 - Present        Generalized weakness ICD-10-CM: R53.1  ICD-9-CM: 780.79  4/30/2020 - Present        Carotid artery stenosis, symptomatic, left ICD-10-CM: X26.61  ICD-9-CM: 433.10  7/26/2019 - Present        HTN (hypertension) ICD-10-CM: I10  ICD-9-CM: 401.9  7/17/2019 - Present        Acute ischemic stroke [facility]) ICD-10-CM: I63.9  ICD-9-CM: 434.91  7/12/2019 - Present        Fall ICD-10-CM: W19. Minh Beard  ICD-9-CM: E888.9  12/24/2018 - Present        CKD (chronic kidney disease), stage III ([facility].) ICD-10-CM: N18.30  ICD-9-CM: 585.3  7/8/2015 - Present        Edema ICD-10-CM: R60.9  ICD-9-CM: 782.3  5/6/2015 - Present        Diabetes mellitus ([facility].) ICD-10-CM: E11.9  ICD-9-CM: 250.00  5/6/2015 - Present        Postoperative anemia due to acute blood loss ICD-10-CM: D62  ICD-9-CM: 285.1  2/14/2015 - Present        S/P CABG (coronary artery bypass graft) ICD-10-CM: Z95.1  ICD-9-CM: V45.81  2/13/2015 - Present        Syncope ICD-10-CM: R55  ICD-9-CM: 780.2  2/9/2015 - Present        Bradycardia ICD-10-CM: R00.1  ICD-9-CM: 427.89  2/9/2015 - Present        MORENO (acute kidney injury) ([facility].) ICD-10-CM: N17.9  ICD-9-CM: 584.9  2/9/2015 - Present        Anemia ICD-10-CM: D64.9  ICD-9-CM: 285.9  9/9/2014 - Present        GI bleed ICD-10-CM: K92.2  ICD-9-CM: 578.9  9/9/2014 - Present        AF (atrial fibrillation) (HCC) ICD-10-CM: I48.91  ICD-9-CM: 427.31  6/20/2014 - Present        Hypotension ICD-10-CM: I95.9  ICD-9-CM: 458.9  6/3/2014 - Present        CAD (coronary artery disease) ICD-10-CM: I25.10  ICD-9-CM: 414.00  6/3/2014 - Present    Overview Signed 2/10/2015 11:06 AM by Bernice Rao     2007 PCI x2 to LAD with STEPHAN             S/P coronary artery stent placement ICD-10-CM: Z95.5  ICD-9-CM: V45.82  6/3/2014 - Present        Renal failure ICD-10-CM: N19  ICD-9-CM: 636  6/3/2014 - Present        Elevated troponin ICD-10-CM: R77.8  ICD-9-CM: 790.6  6/3/2014 - Present        Pericardial effusion ICD-10-CM: I31.3  ICD-9-CM: 423.9  6/3/2014 - Present        Lactic acidosis ICD-10-CM: E87.2  ICD-9-CM: 276.2  6/3/2014 - Present              Signed:   Vineet Huynh MD  4/9/2021  12:41 PM

## 2021-04-09 NOTE — DISCHARGE SUMMARY
Discharge Summary       PATIENT ID: Ramya Chen  MRN: [id number]   YOB: 1940    DATE OF ADMISSION: 3/29/2021 11:53 AM    DATE OF DISCHARGE: 04/09/2021   PRIMARY CARE PROVIDER: Chilo Ferro DO     DISCHARGING PROVIDER: Estela Heredia MD    To contact this individual call [phone] and ask the  to page. If unavailable ask to be transferred the Adult Hospitalist Department. CONSULTATIONS: IP CONSULT TO HOSPITALIST  IP CONSULT TO CARDIOLOGY  IP CONSULT TO NEPHROLOGY  IP CONSULT TO INFECTIOUS DISEASES    PROCEDURES/SURGERIES: Procedure(s):  ANGIOGRAPHY RENAL BILAT  Intravascular Ultrasound  Pta Renal Visceral Artery    ADMITTING DIAGNOSES & HOSPITAL COURSE:   Acute systolic CHF   CAD s/p STEPHAN to LAD  Atrial fibrillation with RVR  MORENO on CKD stage III  UTI with ESBL    79year old female patient with PMH of CAD, CHF, Afib not on AC, CKD, HTN, parkinson's presented to ED for evaluation of sob. Patient was found to have acute systolic heart failure exacerbation, echocardiogram revealed EF of 20%, new onset. Cardiology was consulted, she underwent VQ scan admission which had low probability for PE. She was started on diuresis with improvement. Additionally had atrial fibrillation with rapid ventricular response on admission started on beta-blockers with improvement. She underwent left heart cath on 4/1 which showed occlusion in her LAD. She is status post STEPHAN placement. Initially there was concern for LV thrombus on her transthoracic echocardiogram, however this was now ruled out. Patient had concerning findings on CT of the abdomen for potential renal artery stenosis, therefore had an angiogram which afterwards revealed mild stenosis about 50% bilaterally. Her hospital stay was also complicated by UTI, which was ESBL, sensitive to Levaquin and meropenem. She will need to complete an additional 2 days of antibiotics.   She was started on goal-directed medical therapy by cardiology, will need close follow-up. DISCHARGE DIAGNOSES / PLAN:      Acute systolic CHF, ischemic - NYHA class III on admission and discharge. SOB  - EF 20-25%  - Continue bumex, hydralazine, imdur, coreg, statin   - Cardiology following  - I and O and daily weights  - VQ scan low probability      CAD - s/p [facility] 4/1 with STEPHAN to LAD  - On DAPT  - On BB, imdur, statin   - Cards following  - LV thrombus ruled out     Afib with RVR  - On coreg  - Not on AC due to anemia, does qualify and should consider starting o/p     MORENO on CKD stage 3  - Nephrology following  - Continue diuretics and monitor      BATOOL - mild, ~50% bilaterally on angio  Aortic aneurysm - incidental. 3.9cm, outpatient followup  UTI with ESBL - continue meropenem, plan 7 day course, last day tomorrow 4/9     Hypertension  -Labile blood pressure, hypotensive episode but responded to saline bolus  -Continue Coreg, isosorbide and hydralazine     Parkinson disease   -Continue Sinemet     Anemia  -Anemia of chronic kidney disease  -S/p transfusion of 1 unit PRBCs 4/6  -Monitor H&H     Insomnia  -Ambien as needed.        ADDITIONAL CARE RECOMMENDATIONS:   - Please take all medications as prescribed. Note changes as below. **start imdur, bumex  **Continue antibiotics with levofloxacin for an additional 2 days for UTI  - Please make sure to follow up with your primary care physician within 1-2 weeks of discharge for hospital follow up. You also need to follow up with a cardiologist within 1 week of discharge. If your ejection fraction does not continue to improve, you may need a ICD device in the future. - Please make sure to continue to monitor for: Worsening shortness of breath, increased weight gain, increased chest pain and return to the Emergency Department with any of these symptoms:   - Please get up slowly from a seated or laying position, avoid falls. - Avoid tobacco, alcohol and other illicit drug use.          PENDING TEST RESULTS:   At the time of discharge the following test results are still pending: None    FOLLOW UP APPOINTMENTS:    Follow-up Information     Follow up With Specialties Details Why Contact Info Additional Information    [facility] Call if interested in outpatient cardiac rehab once all home health/inpatient therapies are completed [address] [facility] [address] [facility] [facility], [address]. Please arrive 15 minutes prior to your appointment time and you will register in the [place] [address], [address], on the first floor of the [address]. Telephone: [phone] Fax: [phone] Driving directions To [facility] and Vascular [place]. Building: Driving WEST on [address], take exit [address] to "[employer]". Turn left onto [facility] [address], then turn right into [employer] Parking lot Driving EAST on [address], take exit [address] [facility]. Turn right at the end of the exit ramp. Turn left onto [facility], then turn right into [employer] lot. [address] Call As needed for questions related to home health services [address]  [phone]              DIET: Cardiac Diet    ACTIVITY: PT/OT Eval and Treat    WOUND CARE: None     EQUIPMENT needed: Per PT/OT      DISCHARGE MEDICATIONS:  Current Discharge Medication List      START taking these medications    Details   bumetanide (BUMEX) 1 mg tablet Take 1 Tab by mouth daily for 30 days. Qty: 30 Tab, Refills: 0      isosorbide mononitrate ER (IMDUR) 30 mg tablet Take 1 Tab by mouth daily for 30 days. Qty: 30 Tab, Refills: 0      polyethylene glycol (MIRALAX) 17 gram packet Take 1 Packet by mouth daily for 30 days. Qty: 30 Packet, Refills: 0      levoFLOXacin (Levaquin) 500 mg tablet Take 1 Tab by mouth daily for 2 days.   Qty: 2 Tab, Refills: 0         CONTINUE these medications which have NOT CHANGED    Details   carvediloL (COREG) 3.125 mg tablet Take 1 Tab by mouth two (2) times daily (with meals). Qty: 1 Tab, Refills: 0      hydrALAZINE (APRESOLINE) 25 mg tablet Take 1.5 Tabs by mouth three (3) times daily. Qty: 1 Tab, Refills: 0      naloxone (NARCAN) 4 mg/actuation nasal spray Use 1 spray intranasally, then discard. Repeat with new spray every 2 min as needed for opioid overdose symptoms, alternating nostrils. Qty: 1 Each, Refills: 0      atorvastatin (LIPITOR) 40 mg tablet Take 40 mg by mouth nightly. nitroglycerin (Nitrostat) 0.4 mg SL tablet 0.4 mg by SubLINGual route every five (5) minutes as needed for Chest Pain. Up to 3 doses. aspirin delayed-release (Ecotrin Low Strength) 81 mg tablet Take 81 mg by mouth nightly. clopidogreL (Plavix) 75 mg tab Take 75 mg by mouth daily (after breakfast). carbidopa-levodopa (SINEMET)  mg per tablet Take 2 Tabs by mouth four (4) times daily. Qty: 720 Tab, Refills: 1      acetaminophen (TYLENOL) 325 mg tablet Take 650 mg by mouth every six (6) hours as needed for Pain or Fever. cyanocobalamin (VITAMIN B12) 100 mcg tablet Take 100 mcg by mouth daily. vit C,P-Gc-ddbwt-lutein-zeaxan (PRESERVISION AREDS-2) 459-937-57-1 mg-unit-mg-mg cap capsule Take 1 Cap by mouth two (2) times a day. senna-docusate (SENNA WITH DOCUSATE SODIUM) 8.6-50 mg per tablet Take 1 Tab by mouth nightly. Cholecalciferol, Vitamin D3, 1,000 unit cap Take 1,000 Units by mouth daily. pantoprazole (PROTONIX) 40 mg tablet Take 40 mg by mouth Daily (before breakfast). Indications: h/o bleeding ulcer with nsaid      DULoxetine (CYMBALTA) 60 mg capsule Take 120 mg by mouth daily. Indications: Anxiousness associated with Depression      multivitamins-minerals-lutein (CENTRUM SILVER) tab tablet Take 1 Tab by mouth daily.          STOP taking these medications       amLODIPine (NORVASC) 5 mg tablet Comments:   Reason for Stopping:         furosemide (Lasix) 20 mg tablet Comments:   Reason for Stopping:                 NOTIFY YOUR PHYSICIAN FOR ANY OF THE FOLLOWING:   Fever over 101 degrees for 24 hours. Chest pain, shortness of breath, fever, chills, nausea, vomiting, diarrhea, change in mentation, falling, weakness, bleeding. Severe pain or pain not relieved by medications. Or, any other signs or symptoms that you may have questions about.     DISPOSITION:    Home With:   OT  PT  HH  RN      X  SNF    Independent/assisted living    Hospice    Other:       PATIENT CONDITION AT DISCHARGE:     Functional status   X Poor     Deconditioned     Independent      Cognition    X Lucid     Forgetful     Dementia      Catheters/lines (plus indication)    Sue     PICC     PEG    X None      Code status     Full code    X DNR      PHYSICAL EXAMINATION AT DISCHARGE:  Visit Vitals  BP (!) 159/65 (BP 1 Location: Left arm, BP Patient Position: At rest)   Pulse 69   Temp 98.2 °F (36.8 °C)   Resp 17   Ht 5' 5\" (1.651 m)   Wt 72.3 kg (159 lb 4.8 oz)   SpO2 95%   BMI 26.51 kg/m²     Gen: NAD, awake in bed  HEENT: NC/AT, sclera anicteric, PERRL, EOMI  CV: RRR no m/r/g, normal S1 and S2, no pedal edema   Resp: CTA b/l no increased work of breathing, no wheezing or rhonchi, speaking in full sentences   Abd: NT/ND, normal bowel sounds, no rebound or guarding  Ext: 2+ pulses, no edema  Skin: No rashes or lesions      CHRONIC MEDICAL DIAGNOSES:  Problem List as of 4/9/2021 Date Reviewed: 5/1/2020          Codes Class Noted - Resolved    CHF exacerbation ([facility].) ICD-10-CM: I50.9  ICD-9-CM: 428.0  9/25/2020 - Present        CHF (congestive heart failure) ([facility] 75.) ICD-10-CM: I50.9  ICD-9-CM: 428.0  9/22/2020 - Present        Weakness ICD-10-CM: R53.1  ICD-9-CM: 780.79  5/4/2020 - Present        Generalized weakness ICD-10-CM: R53.1  ICD-9-CM: 780.79  4/30/2020 - Present        Carotid artery stenosis, symptomatic, left ICD-10-CM: Q92.47  ICD-9-CM: 433.10  7/26/2019 - Present        HTN (hypertension) ICD-10-CM: I10  ICD-9-CM: 401.9  7/17/2019 - Present        Acute ischemic stroke [facility]) ICD-10-CM: I63.9  ICD-9-CM: 434.91  7/12/2019 - Present        Fall ICD-10-CM: W19. Kalli Summers  ICD-9-CM: E888.9  12/24/2018 - Present        CKD (chronic kidney disease), stage III ([facility] 75.) ICD-10-CM: N18.30  ICD-9-CM: 585.3  7/8/2015 - Present        Edema ICD-10-CM: R60.9  ICD-9-CM: 782.3  5/6/2015 - Present        Diabetes mellitus ([facility] 75.) ICD-10-CM: E11.9  ICD-9-CM: 250.00  5/6/2015 - Present        Postoperative anemia due to acute blood loss ICD-10-CM: D62  ICD-9-CM: 285.1  2/14/2015 - Present        S/P CABG (coronary artery bypass graft) ICD-10-CM: Z95.1  ICD-9-CM: V45.81  2/13/2015 - Present        Syncope ICD-10-CM: R55  ICD-9-CM: 780.2  2/9/2015 - Present        Bradycardia ICD-10-CM: R00.1  ICD-9-CM: 427.89  2/9/2015 - Present        MORENO (acute kidney injury) ([facility] 75.) ICD-10-CM: N17.9  ICD-9-CM: 584.9  2/9/2015 - Present        Anemia ICD-10-CM: D64.9  ICD-9-CM: 285.9  9/9/2014 - Present        GI bleed ICD-10-CM: K92.2  ICD-9-CM: 578.9  9/9/2014 - Present        AF (atrial fibrillation) (HCC) ICD-10-CM: I48.91  ICD-9-CM: 427.31  6/20/2014 - Present        Hypotension ICD-10-CM: I95.9  ICD-9-CM: 458.9  6/3/2014 - Present        CAD (coronary artery disease) ICD-10-CM: I25.10  ICD-9-CM: 414.00  6/3/2014 - Present    Overview Signed 2/10/2015 11:06 AM by Meet Mancia     2007 PCI x2 to LAD with STEPHAN             S/P coronary artery stent placement ICD-10-CM: Z95.5  ICD-9-CM: V45.82  6/3/2014 - Present        Renal failure ICD-10-CM: N19  ICD-9-CM: 566  6/3/2014 - Present        Elevated troponin ICD-10-CM: R77.8  ICD-9-CM: 790.6  6/3/2014 - Present        Pericardial effusion ICD-10-CM: I31.3  ICD-9-CM: 423.9  6/3/2014 - Present        Lactic acidosis ICD-10-CM: E87.2  ICD-9-CM: 276.2  6/3/2014 - Present              Greater than 30 minutes were spent with the patient on counseling and coordination of care    Signed:   Pedro Lugo MD  4/9/2021  12:41 PM

## 2021-04-14 NOTE — PROGRESS NOTES
PCI registry clarification          ICD-9-CM Class Noted POA       Postoperative anemia due to acute blood loss 285.1   2/14/2015 Unknown            S/P CABG (coronary artery bypass graft) V45.81   2/13/2015 Unknown            Syncope 780. 2   2/9/2015 Unknown            Bradycardia 427.89   2/9/2015 Unknown            MORENO (acute kidney injury) 584. 9   2/9/2015 Unknown            *Hypotension 458. 9   6/3/2014 Yes                 LAD PCI 5/11/2007

## 2021-04-15 ENCOUNTER — PATIENT OUTREACH (OUTPATIENT)
Dept: CASE MANAGEMENT | Age: 81
End: 2021-04-15

## 2021-04-22 ENCOUNTER — PATIENT OUTREACH (OUTPATIENT)
Dept: CASE MANAGEMENT | Age: 81
End: 2021-04-22

## 2021-04-22 NOTE — PROGRESS NOTES
Post Acute Facility Update     *The information contained in this note was received during a weekly care coordination call with the post acute facility*    Current Facility: [address] [place] [address] ([facility])  Anticipated Discharge Date: 4/23/2021    Facility Nursing Update:   Facility Social Work Update: :[facility] issued by insurance on Tuesday for LCD of 4/22. D/C home tomorrow. Facility is concerned about patients safety discharging home alone,  Patient does not want to appeal [employer] [place] [employer]. Facility has stated they plan to contact APS when patient discharges  Upper Extremity Assistance: Minimal Assistance   Lower Extremity Assistance: Minimal Assistance   Bed Mobility: Contact Guard Assist - hands on patient for balance   Transfers: Contact Guard Assist - hands on patient for balance   Ambulation: Contact Guard Assist - hands on patient for balance   How far (in feet) is the patient ambulating? 30  Device Used: walker  Barriers to Discharge: patient safety, will be discharging to home alone.       Shahla GALVANN, RN  Deja

## 2021-04-23 ENCOUNTER — APPOINTMENT (OUTPATIENT)
Dept: CT IMAGING | Age: 81
DRG: 291 | End: 2021-04-23
Attending: FAMILY MEDICINE
Payer: MEDICARE

## 2021-04-23 ENCOUNTER — APPOINTMENT (OUTPATIENT)
Dept: GENERAL RADIOLOGY | Age: 81
DRG: 291 | End: 2021-04-23
Attending: EMERGENCY MEDICINE
Payer: MEDICARE

## 2021-04-23 ENCOUNTER — HOSPITAL ENCOUNTER (INPATIENT)
Age: 81
LOS: 7 days | Discharge: HOME HEALTH | DRG: 291 | End: 2021-04-30
Attending: EMERGENCY MEDICINE | Admitting: FAMILY MEDICINE
Payer: MEDICARE

## 2021-04-23 DIAGNOSIS — Z71.89 COUNSELING REGARDING ADVANCE CARE PLANNING AND GOALS OF CARE: ICD-10-CM

## 2021-04-23 DIAGNOSIS — I50.9 ACUTE ON CHRONIC CONGESTIVE HEART FAILURE, UNSPECIFIED HEART FAILURE TYPE (HCC): ICD-10-CM

## 2021-04-23 DIAGNOSIS — I25.10 CORONARY ARTERY DISEASE DUE TO LIPID RICH PLAQUE: ICD-10-CM

## 2021-04-23 DIAGNOSIS — R06.02 SOB (SHORTNESS OF BREATH): ICD-10-CM

## 2021-04-23 DIAGNOSIS — I25.83 CORONARY ARTERY DISEASE DUE TO LIPID RICH PLAQUE: ICD-10-CM

## 2021-04-23 DIAGNOSIS — I50.23 ACUTE ON CHRONIC SYSTOLIC CONGESTIVE HEART FAILURE (HCC): ICD-10-CM

## 2021-04-23 DIAGNOSIS — J96.00 ACUTE RESPIRATORY FAILURE, UNSPECIFIED WHETHER WITH HYPOXIA OR HYPERCAPNIA (HCC): Primary | ICD-10-CM

## 2021-04-23 DIAGNOSIS — R53.1 WEAKNESS: ICD-10-CM

## 2021-04-23 LAB
ANION GAP SERPL CALC-SCNC: 10 MMOL/L (ref 5–15)
ARTERIAL PATENCY WRIST A: NO
ATRIAL RATE: 58 BPM
BASE EXCESS BLD CALC-SCNC: 2 MMOL/L
BASOPHILS # BLD: 0.1 K/UL (ref 0–0.1)
BASOPHILS NFR BLD: 1 % (ref 0–1)
BDY SITE: ABNORMAL
BNP SERPL-MCNC: 9114 PG/ML
BUN SERPL-MCNC: 23 MG/DL (ref 6–20)
BUN/CREAT SERPL: 16 (ref 12–20)
CA-I BLD-SCNC: 1.25 MMOL/L (ref 1.12–1.32)
CALCIUM SERPL-MCNC: 8.9 MG/DL (ref 8.5–10.1)
CALCULATED P AXIS, ECG09: 35 DEGREES
CALCULATED R AXIS, ECG10: -41 DEGREES
CALCULATED T AXIS, ECG11: 96 DEGREES
CHLORIDE SERPL-SCNC: 104 MMOL/L (ref 97–108)
CO2 SERPL-SCNC: 25 MMOL/L (ref 21–32)
COMMENT, HOLDF: NORMAL
CREAT SERPL-MCNC: 1.47 MG/DL (ref 0.55–1.02)
DIAGNOSIS, 93000: NORMAL
DIFFERENTIAL METHOD BLD: ABNORMAL
EOSINOPHIL # BLD: 0.5 K/UL (ref 0–0.4)
EOSINOPHIL NFR BLD: 6 % (ref 0–7)
ERYTHROCYTE [DISTWIDTH] IN BLOOD BY AUTOMATED COUNT: 15.7 % (ref 11.5–14.5)
GAS FLOW.O2 O2 DELIVERY SYS: ABNORMAL L/MIN
GLUCOSE SERPL-MCNC: 230 MG/DL (ref 65–100)
HCO3 BLD-SCNC: 27.1 MMOL/L (ref 22–26)
HCT VFR BLD AUTO: 30.5 % (ref 35–47)
HGB BLD-MCNC: 9.4 G/DL (ref 11.5–16)
IMM GRANULOCYTES # BLD AUTO: 0.1 K/UL (ref 0–0.04)
IMM GRANULOCYTES NFR BLD AUTO: 1 % (ref 0–0.5)
LACTATE SERPL-SCNC: 0.9 MMOL/L (ref 0.4–2)
LACTATE SERPL-SCNC: 3 MMOL/L (ref 0.4–2)
LYMPHOCYTES # BLD: 0.7 K/UL (ref 0.8–3.5)
LYMPHOCYTES NFR BLD: 8 % (ref 12–49)
MCH RBC QN AUTO: 30.9 PG (ref 26–34)
MCHC RBC AUTO-ENTMCNC: 30.8 G/DL (ref 30–36.5)
MCV RBC AUTO: 100.3 FL (ref 80–99)
MONOCYTES # BLD: 0.7 K/UL (ref 0–1)
MONOCYTES NFR BLD: 8 % (ref 5–13)
NEUTS SEG # BLD: 6.8 K/UL (ref 1.8–8)
NEUTS SEG NFR BLD: 76 % (ref 32–75)
NRBC # BLD: 0 K/UL (ref 0–0.01)
NRBC BLD-RTO: 0 PER 100 WBC
O2/TOTAL GAS SETTING VFR VENT: 40 %
P-R INTERVAL, ECG05: 172 MS
PCO2 BLD: 43.3 MMHG (ref 35–45)
PEEP RESPIRATORY: 6 CMH2O
PH BLD: 7.41 [PH] (ref 7.35–7.45)
PIP ISTAT,IPIP: 12
PLATELET # BLD AUTO: 374 K/UL (ref 150–400)
PMV BLD AUTO: 9 FL (ref 8.9–12.9)
PO2 BLD: 90 MMHG (ref 80–100)
POTASSIUM SERPL-SCNC: 4 MMOL/L (ref 3.5–5.1)
PROCALCITONIN SERPL-MCNC: <0.05 NG/ML
Q-T INTERVAL, ECG07: 484 MS
QRS DURATION, ECG06: 124 MS
QTC CALCULATION (BEZET), ECG08: 475 MS
RBC # BLD AUTO: 3.04 M/UL (ref 3.8–5.2)
RBC MORPH BLD: ABNORMAL
RBC MORPH BLD: ABNORMAL
SAMPLES BEING HELD,HOLD: NORMAL
SAO2 % BLD: 97 % (ref 92–97)
SARS-COV-2, COV2: NORMAL
SARS-COV-2, XPLCVT: NOT DETECTED
SODIUM SERPL-SCNC: 139 MMOL/L (ref 136–145)
SOURCE, COVRS: NORMAL
SPECIMEN TYPE: ABNORMAL
SPONTANEOUS TIMED, IST: YES
TROPONIN I SERPL-MCNC: 0.06 NG/ML
TROPONIN I SERPL-MCNC: <0.05 NG/ML
VENTRICULAR RATE, ECG03: 58 BPM
WBC # BLD AUTO: 8.9 K/UL (ref 3.6–11)

## 2021-04-23 PROCEDURE — 74011250636 HC RX REV CODE- 250/636: Performed by: FAMILY MEDICINE

## 2021-04-23 PROCEDURE — 36415 COLL VENOUS BLD VENIPUNCTURE: CPT

## 2021-04-23 PROCEDURE — 74011250636 HC RX REV CODE- 250/636: Performed by: EMERGENCY MEDICINE

## 2021-04-23 PROCEDURE — 80048 BASIC METABOLIC PNL TOTAL CA: CPT

## 2021-04-23 PROCEDURE — 99285 EMERGENCY DEPT VISIT HI MDM: CPT

## 2021-04-23 PROCEDURE — 74011250637 HC RX REV CODE- 250/637: Performed by: FAMILY MEDICINE

## 2021-04-23 PROCEDURE — 87040 BLOOD CULTURE FOR BACTERIA: CPT

## 2021-04-23 PROCEDURE — 85025 COMPLETE CBC W/AUTO DIFF WBC: CPT

## 2021-04-23 PROCEDURE — 71045 X-RAY EXAM CHEST 1 VIEW: CPT

## 2021-04-23 PROCEDURE — 5A09357 ASSISTANCE WITH RESPIRATORY VENTILATION, LESS THAN 24 CONSECUTIVE HOURS, CONTINUOUS POSITIVE AIRWAY PRESSURE: ICD-10-PCS | Performed by: EMERGENCY MEDICINE

## 2021-04-23 PROCEDURE — 94660 CPAP INITIATION&MGMT: CPT

## 2021-04-23 PROCEDURE — 99223 1ST HOSP IP/OBS HIGH 75: CPT | Performed by: INTERNAL MEDICINE

## 2021-04-23 PROCEDURE — 83605 ASSAY OF LACTIC ACID: CPT

## 2021-04-23 PROCEDURE — 84484 ASSAY OF TROPONIN QUANT: CPT

## 2021-04-23 PROCEDURE — 77010033678 HC OXYGEN DAILY

## 2021-04-23 PROCEDURE — 36600 WITHDRAWAL OF ARTERIAL BLOOD: CPT

## 2021-04-23 PROCEDURE — 71250 CT THORAX DX C-: CPT

## 2021-04-23 PROCEDURE — 96374 THER/PROPH/DIAG INJ IV PUSH: CPT

## 2021-04-23 PROCEDURE — 84145 PROCALCITONIN (PCT): CPT

## 2021-04-23 PROCEDURE — 74176 CT ABD & PELVIS W/O CONTRAST: CPT

## 2021-04-23 PROCEDURE — 83880 ASSAY OF NATRIURETIC PEPTIDE: CPT

## 2021-04-23 PROCEDURE — 82803 BLOOD GASES ANY COMBINATION: CPT

## 2021-04-23 PROCEDURE — 94640 AIRWAY INHALATION TREATMENT: CPT

## 2021-04-23 PROCEDURE — 74011000250 HC RX REV CODE- 250: Performed by: FAMILY MEDICINE

## 2021-04-23 PROCEDURE — 70450 CT HEAD/BRAIN W/O DYE: CPT

## 2021-04-23 PROCEDURE — 93005 ELECTROCARDIOGRAM TRACING: CPT

## 2021-04-23 PROCEDURE — U0005 INFEC AGEN DETEC AMPLI PROBE: HCPCS

## 2021-04-23 PROCEDURE — 65660000001 HC RM ICU INTERMED STEPDOWN

## 2021-04-23 RX ORDER — ALBUTEROL SULFATE 0.83 MG/ML
2.5 SOLUTION RESPIRATORY (INHALATION)
Status: DISCONTINUED | OUTPATIENT
Start: 2021-04-23 | End: 2021-04-24

## 2021-04-23 RX ORDER — BUMETANIDE 0.25 MG/ML
1 INJECTION INTRAMUSCULAR; INTRAVENOUS 2 TIMES DAILY
Status: DISCONTINUED | OUTPATIENT
Start: 2021-04-23 | End: 2021-04-26

## 2021-04-23 RX ORDER — GABAPENTIN 100 MG/1
100 CAPSULE ORAL
COMMUNITY
End: 2022-02-11

## 2021-04-23 RX ORDER — HEPARIN SODIUM 5000 [USP'U]/ML
5000 INJECTION, SOLUTION INTRAVENOUS; SUBCUTANEOUS EVERY 8 HOURS
Status: DISCONTINUED | OUTPATIENT
Start: 2021-04-23 | End: 2021-04-30 | Stop reason: HOSPADM

## 2021-04-23 RX ORDER — ACETAMINOPHEN 325 MG/1
650 TABLET ORAL
Status: DISCONTINUED | OUTPATIENT
Start: 2021-04-23 | End: 2021-04-30 | Stop reason: HOSPADM

## 2021-04-23 RX ORDER — NITROGLYCERIN 0.4 MG/1
0.4 TABLET SUBLINGUAL
Status: DISCONTINUED | OUTPATIENT
Start: 2021-04-23 | End: 2021-04-30 | Stop reason: HOSPADM

## 2021-04-23 RX ORDER — POTASSIUM CHLORIDE 750 MG/1
20 TABLET, FILM COATED, EXTENDED RELEASE ORAL DAILY
COMMUNITY
End: 2021-12-30

## 2021-04-23 RX ORDER — CLOPIDOGREL BISULFATE 75 MG/1
75 TABLET ORAL
Status: DISCONTINUED | OUTPATIENT
Start: 2021-04-24 | End: 2021-04-30 | Stop reason: HOSPADM

## 2021-04-23 RX ORDER — DULOXETIN HYDROCHLORIDE 60 MG/1
120 CAPSULE, DELAYED RELEASE ORAL DAILY
Status: DISCONTINUED | OUTPATIENT
Start: 2021-04-24 | End: 2021-04-30 | Stop reason: HOSPADM

## 2021-04-23 RX ORDER — CARVEDILOL 3.12 MG/1
3.12 TABLET ORAL 2 TIMES DAILY WITH MEALS
Status: DISCONTINUED | OUTPATIENT
Start: 2021-04-23 | End: 2021-04-26

## 2021-04-23 RX ORDER — ASPIRIN 81 MG/1
81 TABLET ORAL
Status: DISCONTINUED | OUTPATIENT
Start: 2021-04-23 | End: 2021-04-30 | Stop reason: HOSPADM

## 2021-04-23 RX ORDER — SODIUM CHLORIDE 0.9 % (FLUSH) 0.9 %
5-40 SYRINGE (ML) INJECTION AS NEEDED
Status: DISCONTINUED | OUTPATIENT
Start: 2021-04-23 | End: 2021-04-30 | Stop reason: HOSPADM

## 2021-04-23 RX ORDER — CARBIDOPA AND LEVODOPA 25; 100 MG/1; MG/1
2 TABLET ORAL 4 TIMES DAILY
Status: DISCONTINUED | OUTPATIENT
Start: 2021-04-23 | End: 2021-04-30 | Stop reason: HOSPADM

## 2021-04-23 RX ORDER — SODIUM CHLORIDE 0.9 % (FLUSH) 0.9 %
5-40 SYRINGE (ML) INJECTION EVERY 8 HOURS
Status: DISCONTINUED | OUTPATIENT
Start: 2021-04-23 | End: 2021-04-30 | Stop reason: HOSPADM

## 2021-04-23 RX ORDER — PANTOPRAZOLE SODIUM 40 MG/1
40 TABLET, DELAYED RELEASE ORAL
Status: DISCONTINUED | OUTPATIENT
Start: 2021-04-24 | End: 2021-04-30 | Stop reason: HOSPADM

## 2021-04-23 RX ORDER — ATORVASTATIN CALCIUM 40 MG/1
40 TABLET, FILM COATED ORAL
Status: DISCONTINUED | OUTPATIENT
Start: 2021-04-23 | End: 2021-04-30 | Stop reason: HOSPADM

## 2021-04-23 RX ORDER — FUROSEMIDE 10 MG/ML
40 INJECTION INTRAMUSCULAR; INTRAVENOUS
Status: COMPLETED | OUTPATIENT
Start: 2021-04-23 | End: 2021-04-23

## 2021-04-23 RX ORDER — LANOLIN ALCOHOL/MO/W.PET/CERES
6 CREAM (GRAM) TOPICAL
COMMUNITY
End: 2022-02-11

## 2021-04-23 RX ORDER — HYDRALAZINE HYDROCHLORIDE 25 MG/1
37.5 TABLET, FILM COATED ORAL 3 TIMES DAILY
Status: DISCONTINUED | OUTPATIENT
Start: 2021-04-23 | End: 2021-04-24

## 2021-04-23 RX ADMIN — HEPARIN SODIUM 5000 UNITS: 5000 INJECTION INTRAVENOUS; SUBCUTANEOUS at 13:48

## 2021-04-23 RX ADMIN — ATORVASTATIN CALCIUM 40 MG: 40 TABLET, FILM COATED ORAL at 21:11

## 2021-04-23 RX ADMIN — HYDRALAZINE HYDROCHLORIDE 37.5 MG: 25 TABLET, FILM COATED ORAL at 21:11

## 2021-04-23 RX ADMIN — CARBIDOPA AND LEVODOPA 2 TABLET: 25; 100 TABLET ORAL at 18:46

## 2021-04-23 RX ADMIN — CARBIDOPA AND LEVODOPA 2 TABLET: 25; 100 TABLET ORAL at 21:11

## 2021-04-23 RX ADMIN — HYDRALAZINE HYDROCHLORIDE 37.5 MG: 25 TABLET, FILM COATED ORAL at 18:47

## 2021-04-23 RX ADMIN — ASPIRIN 81 MG: 81 TABLET, COATED ORAL at 21:11

## 2021-04-23 RX ADMIN — HEPARIN SODIUM 5000 UNITS: 5000 INJECTION INTRAVENOUS; SUBCUTANEOUS at 21:11

## 2021-04-23 RX ADMIN — CARVEDILOL 3.12 MG: 3.12 TABLET, FILM COATED ORAL at 18:47

## 2021-04-23 RX ADMIN — ALBUTEROL SULFATE 2.5 MG: 2.5 SOLUTION RESPIRATORY (INHALATION) at 23:00

## 2021-04-23 RX ADMIN — FUROSEMIDE 40 MG: 10 INJECTION, SOLUTION INTRAMUSCULAR; INTRAVENOUS at 09:07

## 2021-04-23 RX ADMIN — BUMETANIDE 1 MG: 0.25 INJECTION INTRAMUSCULAR; INTRAVENOUS at 13:48

## 2021-04-23 RX ADMIN — BUMETANIDE 1 MG: 0.25 INJECTION INTRAMUSCULAR; INTRAVENOUS at 18:48

## 2021-04-23 RX ADMIN — Medication 10 ML: at 21:15

## 2021-04-23 NOTE — PROGRESS NOTES
04/23/21 0842   Oxygen Therapy   O2 Sat (%) 100 %   Pulse via Oximetry 53 beats per minute   O2 Device BIPAP   FIO2 (%) 70 %  (decrease 60)

## 2021-04-23 NOTE — PROGRESS NOTES
Bedside and Verbal shift change report given to [facility] (oncoming nurse) by Todd Stratton. Saeid MIGUEL (offgoing nurse).  Report included the following information SBAR, Kardex, MAR, Recent Results and Cardiac Rhythm SB.

## 2021-04-23 NOTE — H&P
History & Physical    Primary Care Provider: Claudette Lowery DO  Source of Information: Patient's chart and ER physician    History of Presenting Illness:   Marya Hillman is a 79 y.o. female who presents with shortness of breath    Patient is on BiPAP, history was limited, patient apparently presents from nursing facility with shortness of breath. Patient started having shortness of breath that started around 3:00 earlier this morning. Patient has history of CHF. Patient was brought to the hospital and was found to be in acute hypoxic respiratory failure with O2 sats around 50 to 80% and was put on nonrebreather. After the the same patient was put on BiPAP and was requested to be admitted to the hospitalist service. Patient currently is somewhat somnolent on BiPAP, opens eyes to verbal commands and answer some question. Patient denies any cough and chest pain associated with her symptoms. The patient denies any Headache, blurry vision, sore throat, trouble swallowing, trouble with speech, chest pain, fever, chills, N/V/D, abd pain, urinary symptoms, constipation, recent travels, sick contacts, focal or generalized neurological symptoms,  falls, injuries, rashes, contact with COVID-19 diagnosed patients, hematemesis, melena, hemoptysis, hematuria, rashes, denies starting any new medications and denies any other concerns or problems besides as mentioned above. Review of Systems:  A comprehensive review of systems was negative except for that written in the History of Present Illness.    All other systems reviewed, pertinent positives and negatives noted in HPI    Past Medical History:   Diagnosis Date    Anxiety disorder     Atrial fibrillation (HCC)     CAD (coronary artery disease) 2007    stents, CABG x 3v    Carotid stenosis     Cervical stenosis of spinal canal 07/2019    Chronic kidney disease     Cough     CVA [facility]cere[facility] vascular accident) ([facility] [address] 07/2019    left lacunar infarct at head of caudate    Depression     AND CHRONIC ANXIETY    Diabetes ([facility] [address]     GERD (gastroesophageal reflux disease)     High cholesterol     History of peptic ulcer     Bleeding ulcer with increased NSAID use    Hypertension     Left carotid stenosis 07/2019    s/p left CEA with Dr. Chaparro Mc, old 2007    PUD (peptic ulcer disease)     Stroke ([facility] [address]     Tremor     Valvular heart disease       Past Surgical History:   Procedure Laterality Date    HX CAROTID ENDARTERECTOMY  07/2019    HX CORONARY ARTERY BYPASS GRAFT      HX TONSILLECTOMY  1963    IN CARDIAC SURG PROCEDURE UNLIST      CABG X3 VESSEL    IN TOTAL KNEE ARTHROPLASTY Right 2015     Prior to Admission medications    Medication Sig Start Date End Date Taking? Authorizing Provider   bumetanide (BUMEX) 1 mg tablet Take 1 Tab by mouth daily for 30 days. 4/10/21 5/10/21  Jaelyn CHRISTIANSON MD   isosorbide mononitrate ER (IMDUR) 30 mg tablet Take 1 Tab by mouth daily for 30 days. 4/10/21 5/10/21  Jaelyn CHRISTIANSON MD   polyethylene glycol (MIRALAX) 17 gram packet Take 1 Packet by mouth daily for 30 days. 4/10/21 5/10/21  Jaelyn CHRISTIANSON MD   carvediloL (COREG) 3.125 mg tablet Take 1 Tab by mouth two (2) times daily (with meals). 10/4/20   Lyndsay Carter MD   hydrALAZINE (APRESOLINE) 25 mg tablet Take 1.5 Tabs by mouth three (3) times daily. 10/4/20   Lyndsay Carter MD   naloxone [facility]) 4 mg/actuation nasal spray Use 1 spray intranasally, then discard. Repeat with new spray every 2 min as needed for opioid overdose symptoms, alternating nostrils. 10/4/20   Lyndsay Carter MD   atorvastatin (LIPITOR) 40 mg tablet Take 40 mg by mouth nightly. Provider, Historical   nitroglycerin (Nitrostat) 0.4 mg SL tablet 0.4 mg by SubLINGual route every five (5) minutes as needed for Chest Pain. Up to 3 doses.     Provider, Historical   aspirin delayed-release (Ecotrin Low Strength) 81 mg tablet Take 81 mg by mouth nightly. Provider, Historical   clopidogreL (Plavix) 75 mg tab Take 75 mg by mouth daily (after breakfast). Provider, Historical   carbidopa-levodopa (SINEMET)  mg per tablet Take 2 Tabs by mouth four (4) times daily. 2/4/20   Rae Dougherty MD   acetaminophen (TYLENOL) 325 mg tablet Take 650 mg by mouth every six (6) hours as needed for Pain or Fever. Provider, Historical   cyanocobalamin (VITAMIN B12) 100 mcg tablet Take 100 mcg by mouth daily. Provider, Historical   vit C,D-Rb-xlvki-lutein-zeaxan (PRESERVISION AREDS-2) 912-047-53-1 mg-unit-mg-mg cap capsule Take 1 Cap by mouth two (2) times a day. Provider, Historical   senna-docusate (SENNA WITH DOCUSATE SODIUM) 8.6-50 mg per tablet Take 1 Tab by mouth nightly. Provider, Historical   Cholecalciferol, Vitamin D3, 1,000 unit cap Take 1,000 Units by mouth daily. Provider, Historical   pantoprazole (PROTONIX) 40 mg tablet Take 40 mg by mouth Daily (before breakfast). Indications: h/o bleeding ulcer with nsaid    Provider, Historical   DULoxetine (CYMBALTA) 60 mg capsule Take 120 mg by mouth daily. Indications: Anxiousness associated with Depression    Provider, Historical   multivitamins-minerals-lutein (CENTRUM SILVER) tab tablet Take 1 Tab by mouth daily. Provider, Historical     No Known Allergies   Family History   Problem Relation Age of Onset    Heart Attack Mother 72        Dec 81yo    Hypertension Mother     Other Father         Unknown    Parkinson's Disease Brother     Anesth Problems Neg Hx       Family history was discussed with the patient, all pertinent and relevant details are mentioned as above, no other pertinent and relevant family history was noted on my discussion with the patient.   Patient specifically denies any history of Gaucher disease in the family  SOCIAL HISTORY:  Patient resides:  Independently x   Assisted Living    SNF    With family care       Smoking history:   None Former x   Chronic      Alcohol history:   None x   Social    Chronic      Ambulates:   Independently x   w/cane    w/walker    w/wc    CODE STATUS:  DNR    Full x   Other      Objective:     Physical Exam:     Visit Vitals  /67   Pulse (!) 54   Temp 97.9 °F (36.6 °C)   Resp 18   SpO2 100%      O2 Device: BIPAP    General : alert x 2, distressed female, on BiPAP  HEENT: PEERL, moist mucus membrane, TM clear  Neck: supple, no JVD, no meningeal signs  Chest: Decreased basal breath sounds with crackles  CVS: S1 S2 heard, Capillary refill less than 2 seconds  Abd: soft/ Non tender, non distended, BS physiological, there is a large area of bruising in the groin  Ext: no clubbing, no cyanosis, chronic venous changes with 1+ pitting edema  Neuro/Psych: Limited exam, DTR 1+x4,  Skin: warm     EKG:.  Sinus bradycardia with nonspecific ST changes      Data Review:     Recent Days:  Recent Labs     04/23/21  0640   WBC 8.9   HGB 9.4*   HCT 30.5*        Recent Labs     04/23/21  0640      K 4.0      CO2 25   *   BUN 23*   CREA 1.47*   CA 8.9     No results for input(s): PH, PCO2, PO2, HCO3, FIO2 in the last 72 hours. 24 Hour Results:  Recent Results (from the past 24 hour(s))   CBC WITH AUTOMATED DIFF    Collection Time: 04/23/21  6:40 AM   Result Value Ref Range    WBC 8.9 3.6 - 11.0 K/uL    RBC 3.04 (L) 3.80 - 5.20 M/uL    HGB 9.4 (L) 11.5 - 16.0 g/dL    HCT 30.5 (L) 35.0 - 47.0 %    .3 (H) 80.0 - 99.0 FL    MCH 30.9 26.0 - 34.0 PG    MCHC 30.8 30.0 - 36.5 g/dL    RDW 15.7 (H) 11.5 - 14.5 %    PLATELET 022 439 - 889 K/uL    MPV 9.0 8.9 - 12.9 FL    NRBC 0.0 0  WBC    ABSOLUTE NRBC 0.00 0.00 - 0.01 K/uL    NEUTROPHILS 76 (H) 32 - 75 %    LYMPHOCYTES 8 (L) 12 - 49 %    MONOCYTES 8 5 - 13 %    EOSINOPHILS 6 0 - 7 %    BASOPHILS 1 0 - 1 %    IMMATURE GRANULOCYTES 1 (H) 0.0 - 0.5 %    ABS. NEUTROPHILS 6.8 1.8 - 8.0 K/UL    ABS. LYMPHOCYTES 0.7 (L) 0.8 - 3.5 K/UL    ABS.  MONOCYTES 0.7 0.0 - 1.0 K/UL    ABS. EOSINOPHILS 0.5 (H) 0.0 - 0.4 K/UL    ABS. BASOPHILS 0.1 0.0 - 0.1 K/UL    ABS. IMM. GRANS. 0.1 (H) 0.00 - 0.04 K/UL    DF SMEAR SCANNED      RBC COMMENTS OVALOCYTES  PRESENT        RBC COMMENTS ANISOCYTOSIS  1+       METABOLIC PANEL, BASIC    Collection Time: 04/23/21  6:40 AM   Result Value Ref Range    Sodium 139 136 - 145 mmol/L    Potassium 4.0 3.5 - 5.1 mmol/L    Chloride 104 97 - 108 mmol/L    CO2 25 21 - 32 mmol/L    Anion gap 10 5 - 15 mmol/L    Glucose 230 (H) 65 - 100 mg/dL    BUN 23 (H) 6 - 20 MG/DL    Creatinine 1.47 (H) 0.55 - 1.02 MG/DL    BUN/Creatinine ratio 16 12 - 20      GFR est AA 42 (L) >60 ml/min/1.73m2    GFR est non-AA 34 (L) >60 ml/min/1.73m2    Calcium 8.9 8.5 - 10.1 MG/DL   TROPONIN I    Collection Time: 04/23/21  6:40 AM   Result Value Ref Range    Troponin-I, Qt. <0.05 <0.05 ng/mL   NT-PRO BNP    Collection Time: 04/23/21  6:40 AM   Result Value Ref Range    NT pro-BNP 9,114 (H) <450 PG/ML   SAMPLES BEING HELD    Collection Time: 04/23/21  6:40 AM   Result Value Ref Range    SAMPLES BEING HELD 1RED 1BLU 2BC(SILV)     COMMENT        Add-on orders for these samples will be processed based on acceptable specimen integrity and analyte stability, which may vary by analyte.    LACTIC ACID    Collection Time: 04/23/21  6:41 AM   Result Value Ref Range    Lactic acid 3.0 (HH) 0.4 - 2.0 MMOL/L   EKG, 12 LEAD, INITIAL    Collection Time: 04/23/21  7:13 AM   Result Value Ref Range    Ventricular Rate 58 BPM    Atrial Rate 58 BPM    P-R Interval 172 ms    QRS Duration 124 ms    Q-T Interval 484 ms    QTC Calculation (Bezet) 475 ms    Calculated P Axis 35 degrees    Calculated R Axis -41 degrees    Calculated T Axis 96 degrees    Diagnosis       Sinus bradycardia  Left axis deviation  Septal infarct (cited on or before 01-APR-2021)  T wave abnormality, consider lateral ischemia  When compared with ECG of 01-APR-2021 15:04,  T wave inversion no longer evident in Anterior leads  Inverted T waves have replaced nonspecific T wave abnormality in Lateral   leads  QT has shortened  Confirmed by Paulo Perales MD, Tom Benitez ([id number]) on 4/23/2021 7:29:27 AM     SARS-COV-2    Collection Time: 04/23/21  7:25 AM   Result Value Ref Range    SARS-CoV-2 Please find results under separate order           Imaging:   Xr Chest Port    Result Date: 4/23/2021  Interval increased interstitial and parenchymal opacities compared to prior study. Diminished left effusion. Assessment/Plan     Acute on chronic hypoxic respiratory failure: Likely secondary to acute on chronic systolic congestive heart failure exacerbation, appears to be NYHA class IV on presentation, continue NIV, IV diuretics, strict I's and O's, daily weights, echocardiogram, cardiology consult, patient with elevated lactic acid, pleural effusion, start empirical antibiotics, get procalcitonin level, blood cultures and supportive care, continue to monitor, reassess as needed    Metabolic encephalopathy: Likely secondary to above, ABG ordered, CT of the head, neurovascular checks, treat the underlying problem, repeat labs in the morning, continue to monitor, if persist may consider further intervention and diagnostics, continue to monitor    A. fib: Rate controlled, continue to monitor, reassess as needed    Hypertension: Continue home medications and continue monitor      GI DVT prophylaxis: Patient will be on heparin           Please note that this dictation was completed with [employer], the computer voice recognition software. Quite often unanticipated grammatical, syntax, homophones, and other interpretive errors are inadvertently transcribed by the computer software. Please disregard these errors. Please excuse any errors that have escaped final proofreading.          Signed By: Annita Arce MD     April 23, 2021

## 2021-04-23 NOTE — ED NOTES
ED MD EKG interpretation: There is a normal sinus rhythm with a rate of 58 beats a minute. Axis is left. No ectopy is noted.   Nonspecific ST and T changes with some by phasing of the T and the 5 and V6.

## 2021-04-23 NOTE — ED NOTES
The patient was taken over at change of shift from Dr. Rosette Pérez. He had placed her on BiPAP for suspected CHF. The patient is improved dramatically with that treatment. Her pulse ox is 100% currently and the patient is easily arousable and talking in full sentences. Her saturations remained stable. She does have Rales in both lung fields. Her chest x-ray suggests significant interstitial edema. There is some increased density in the right lower lobe which is more pronounced than it was on her previous x-ray on 31 March of this year. There is 1-2+ pitting pretibial edema. Patient is given Lasix at this time awaiting regular labs. She denies any fever or chill. There has been an occasional cough productive of some dark phlegm today before she left her apartment. She denies any other acute symptomatology. There has been no nausea or vomiting and no other GI or  symptoms. The lactate is noted to be over 3. It was also noted the patient was extremely hypoxic at home when EMS arrived. There is no clear etiology of an infectious source at this time. The patient's white blood cell count is 8.9 with no bands. This acidemia is more likely related to hypoxemia from her respiratory difficulties throughout the night. Blood cultures are pending. Patient will require admission.

## 2021-04-23 NOTE — PROGRESS NOTES
Admission Medication Reconciliation:    Update: Fax received, PTA med list updated as follows: Added Gabapentin, melatonin, potassium    Revised: Bumetanide to 2 mg  ++++++++++++++++++++++++++++++++++++++++++++++++++++++  In progress:    Lana Castro [facility] @ [phone], spoke with nurse on [address] who will fax medication list to [facility] inpatient pharmacy. Will update PTA med list and post progress note once current information is obtained. The information that is noted in [address] is not current. Thank you for allowing me to participate in the care of your patient. Kelsey Mcneill PharmD, RN # [phone]       [facility] pharmacy benefit data reflects medications filled and processed through the patient's insurance, however   this data does NOT capture whether the medication was picked up or is currently being taken by the patient. Allergies:  Patient has no known allergies. Significant PMH/Disease States:   Past Medical History:   Diagnosis Date    Anxiety disorder     Atrial fibrillation (HCC)     CAD (coronary artery disease) 2007    stents, CABG x 3v    Carotid stenosis     Cervical stenosis of spinal canal 07/2019    Chronic kidney disease     Cough     CVA (cerebral vascular accident) ([address] 07/2019    left lacunar infarct at head of caudate    Depression     AND CHRONIC ANXIETY    Diabetes (HCC)     GERD (gastroesophageal reflux disease)     High cholesterol     History of peptic ulcer     Bleeding ulcer with increased NSAID use    Hypertension     Left carotid stenosis 07/2019    s/p left CEA with Dr. Bharath Pappas, old 2007    PUD (peptic ulcer disease)     Stroke ([facility] [address]     Tremor     Valvular heart disease      Chief Complaint for this Admission:    Chief Complaint   Patient presents with    Respiratory Distress     Prior to Admission Medications:   Prior to Admission Medications   Prescriptions Last Dose Informant Taking?    Cholecalciferol, Vitamin D3, 1,000 unit cap No   Sig: Take 1,000 Units by mouth daily. DULoxetine (CYMBALTA) 60 mg capsule   No   Sig: Take 120 mg by mouth daily. Indications: Anxiousness associated with Depression   acetaminophen (TYLENOL) 325 mg tablet   No   Sig: Take 650 mg by mouth every six (6) hours as needed for Pain or Fever. aspirin delayed-release (Ecotrin Low Strength) 81 mg tablet   No   Sig: Take 81 mg by mouth nightly. atorvastatin (LIPITOR) 40 mg tablet   No   Sig: Take 40 mg by mouth nightly. bumetanide (BUMEX) 1 mg tablet   No   Sig: Take 1 Tab by mouth daily for 30 days. carbidopa-levodopa (SINEMET)  mg per tablet   No   Sig: Take 2 Tabs by mouth four (4) times daily. carvediloL (COREG) 3.125 mg tablet   No   Sig: Take 1 Tab by mouth two (2) times daily (with meals). clopidogreL (Plavix) 75 mg tab   No   Sig: Take 75 mg by mouth daily (after breakfast). cyanocobalamin (VITAMIN B12) 100 mcg tablet   No   Sig: Take 100 mcg by mouth daily. hydrALAZINE (APRESOLINE) 25 mg tablet   No   Sig: Take 1.5 Tabs by mouth three (3) times daily. isosorbide mononitrate ER (IMDUR) 30 mg tablet   No   Sig: Take 1 Tab by mouth daily for 30 days. multivitamins-minerals-lutein (CENTRUM SILVER) tab tablet   No   Sig: Take 1 Tab by mouth daily. naloxone (NARCAN) 4 mg/actuation nasal spray   No   Sig: Use 1 spray intranasally, then discard. Repeat with new spray every 2 min as needed for opioid overdose symptoms, alternating nostrils. nitroglycerin (Nitrostat) 0.4 mg SL tablet   No   Si.4 mg by SubLINGual route every five (5) minutes as needed for Chest Pain. Up to 3 doses. pantoprazole (PROTONIX) 40 mg tablet   No   Sig: Take 40 mg by mouth Daily (before breakfast). Indications: h/o bleeding ulcer with nsaid   polyethylene glycol (MIRALAX) 17 gram packet   No   Sig: Take 1 Packet by mouth daily for 30 days. senna-docusate (SENNA WITH DOCUSATE SODIUM) 8.6-50 mg per tablet   No   Sig: Take 1 Tab by mouth nightly.    vit C,O-Dg-bcwwe-lutein-zeaxan (PRESERVISION AREDS-2) [id number] mg-unit-mg-mg cap capsule   No   Sig: Take 1 Cap by mouth two (2) times a day. Facility-Administered Medications: None     Please contact the main inpatient pharmacy with any questions or concerns at [phone] and we will direct you to the clinical pharmacist covering this patient's care while in-house.    ABIMAEL Francois Res

## 2021-04-23 NOTE — PROGRESS NOTES
04/23/21 0900   Oxygen Therapy   O2 Sat (%) 100 %   Pulse via Oximetry 53 beats per minute   O2 Device BIPAP   FIO2 (%) 60 %  (decrease to 50%)

## 2021-04-23 NOTE — CONSULTS
Cardiology Consult/Progress Note           4/23/2021  6:29 AM   SOB (shortness of breath) [R06.02]   Requesting physician: Dr. Bebeto Sommers, Dr. Chase Espana    HPI:   Marty Noland is a 79 y.o. female admitted for SOB (shortness of breath) [R06.02]. She is a patient of Dr. Irma Lopez and Dr. Tesfaye Prieto who has history of coronary artery disease, is status post coronary artery bypass grafting and has had recurrent hospitalizations for congestive heart failure. She is currently admitted to the hospital with progressive shortness of breath of 2 days duration. Right before being brought to the emergency room, she was extremely hypoxemic with significant orthopnea and PND. No chest pain was reported by the patient. Notably she had history of hypertensive heart disease as well with previously uncontrolled blood pressures. She has moderate renal disease. EF has been ranging between 30 to 50% based on whether is performed when she is in the left bundle branch block versus narrow conduction. 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 Moreover a few weeks ago she had presented to the hospital with chest discomfort during which she had PCI performed to her native LAD through LIMA graft. She has residual small vessel disease which was deferred. She was discharged after optimizing her blood pressure and heart failure therapies. Plan was to consider cardiac resynchronization therapy downstream assuming her EF and heart failure symptoms do not improve. She also underwent a renal angiogram because of persistent hypertension which demonstrated moderate renal arterial disease but not severe. Investigation:  Telemetry: normal sinus rhythm  ECG: ECGs were reviewed over the past few months and demonstrated left bundle branch block on certain occasions and normal conduction another occasion. Echocardiogram: Echocardiogram from 2020 showed normal EF.   Echocardiogram from April 2021 was personally reviewed. During the beats when she had left bundle branch block EF appeared to be about 35% and at other times when she had narrow conduction EF appeared to be almost 50%. She also had significant mitral regurgitation on echo in March 2021. Assessment and Plan     1. Coronary disease status post coronary bypass grafting with recent PCI of distal LAD through the LIMA graft. 2.  Mild to moderately reduced LV systolic function  3. Heart failure reduced ejection fraction secondary to #1 and #2  4. At least moderate to severe mitral regurgitation likely functional in nature  5. History of cerebrovascular disease status post carotid endarterectomy and prior CVA  6. Hypertension with intermittent episodes of hypertensive emergency  7. Moderate renal artery disease but not critical enough to require intervention  8. History of paroxysmal atrial fibrillation  9. Intermittent left bundle branch block  10. Acute on chronic systolic heart failure secondary to #1 and #9  11. Acute hypoxemic respiratory failure secondary to #1, #4, #10  Ms. Ramos present to the hospital with acute hypoxemic respiratory failure with sats in 70s. I believe is a combination of acute decompensated congestive heart failure, likely hypertensive emergency, functional mitral regurgitation getting worse in the setting of hypertensive emergency. She does not demonstrate any chest pain suggesting subacute stent thrombosis. She has responded well to initial diuresis as well as BiPAP in the emergency room. Recommend continued diuresis and adequate blood pressure treatment. We will reassess her mitral regurgitation on echocardiogram once she is adequately diuresed. She may potentially benefit from cardiac resynchronization therapy given underlying left bundle branch block. Will discuss with electrophysiology regarding this.     Guideline directed medical therapy for coronary disease, cerebrovascular disease, atrial fibrillation will be continued. Previously she was not deemed a candidate for oral anticoagulation and for now we will continue her on dual antiplatelet therapy. [x]    High complexity decision making was performed  [x]    Patient is at high-risk of decompensation with multiple organ involvement    Review of Symptoms:  Respiratory: No exertional dyspnea, orthopnea, PND, cough, hemoptysis, URI. Cardiovascular: No CP, palpitations, sweating, lightheadedness, dizziness, syncope, presyncope.  + lower extremity swelling. *Otherwise no other pertinent positive or negative symptoms on ROS.      Patient Active Problem List    Diagnosis Date Noted    SOB (shortness of breath) 04/23/2021    UTI (urinary tract infection) 04/09/2021    CHF exacerbation ([facility] 09/25/2020    [facility] 09/22/2020    Weakness 05/04/2020    Generalized weakness 04/30/2020    Carotid artery stenosis, symptomatic, left 07/26/2019    HTN (hypertension) 07/17/2019    Acute ischemic stroke ([facility] 07/12/2019    Fall 12/24/2018    CKD (chronic kidney disease), stage III ([address] 07/08/2015    Edema 05/06/2015    Diabetes mellitus ([address] 05/06/2015    Postoperative anemia due to acute blood loss 02/14/2015    S/P CABG (coronary artery bypass graft) 02/13/2015    Syncope 02/09/2015    Bradycardia 02/09/2015    Acute kidney injury ([address] 02/09/2015    Anemia 09/09/2014    GI bleed 09/09/2014    AF (atrial fibrillation) ([facility] 06/20/2014    Hypotension 06/03/2014    Coronary artery disease 06/03/2014    S/P coronary artery stent placement 06/03/2014    Renal failure 06/03/2014    Elevated troponin 06/03/2014    Pericardial effusion 06/03/2014    Lactic acidosis 06/03/2014      Bhargav Downy, DO  Past Medical History:   Diagnosis Date    Anxiety disorder     Atrial fibrillation ([address]     CAD (coronary artery disease) 2007    stents, CABG x 3v    Carotid stenosis     Cervical stenosis of spinal canal 2019    Chronic kidney disease     Cough     CVA (cerebral vascular accident) ([facility] 2019    left lacunar infarct at head of caudate    Depression     AND CHRONIC ANXIETY    Diabetes ([facility]     GERD (gastroesophageal reflux disease)     High cholesterol     History of peptic ulcer     Bleeding ulcer with increased NSAID use    Hypertension     Left carotid stenosis 2019    s/p left CEA with Dr. Russell Jhaveri MI, old     PUD (peptic ulcer disease)     Stroke ([facility]     Tremor     Valvular heart disease       Past Surgical History:   Procedure Laterality Date    HX CAROTID ENDARTERECTOMY  2019    HX CORONARY ARTERY BYPASS GRAFT      HX TONSILLECTOMY  1963    HI CARDIAC SURG PROCEDURE UNLIST      CABG X3 VESSEL    HI TOTAL KNEE ARTHROPLASTY Right 2015     Social History     Socioeconomic History    Marital status: SINGLE     Spouse name: Not on file    Number of children: Not on file    Years of education: Not on file    Highest education level: Not on file   Occupational History    Occupation: Retired realestate/teacher   Tobacco Use    Smoking status: Former Smoker     Packs/day: 0.25     Years: 5.00     Pack years: 1.25     Types: Cigarettes     Quit date:      Years since quittin.3    Smokeless tobacco: Never Used   Substance and Sexual Activity    Alcohol use: Yes     Alcohol/week: 0.0 standard drinks     Comment: Rare    Drug use: No   Social History Narrative    Lives in [facility]     Family History   Problem Relation Age of Onset    Heart Attack Mother 72        Dec 81yo    Hypertension Mother     Other Father         Unknown    Parkinson's Disease Brother     Anesth Problems Neg Hx       No current facility-administered medications for this encounter. Current Outpatient Medications   Medication Sig    bumetanide (BUMEX) 1 mg tablet Take 1 Tab by mouth daily for 30 days.     isosorbide mononitrate ER (IMDUR) 30 mg tablet Take 1 Tab by mouth daily for 30 days.  polyethylene glycol (MIRALAX) 17 gram packet Take 1 Packet by mouth daily for 30 days.  carvediloL (COREG) 3.125 mg tablet Take 1 Tab by mouth two (2) times daily (with meals).  hydrALAZINE (APRESOLINE) 25 mg tablet Take 1.5 Tabs by mouth three (3) times daily.  naloxone (NARCAN) 4 mg/actuation nasal spray Use 1 spray intranasally, then discard. Repeat with new spray every 2 min as needed for opioid overdose symptoms, alternating nostrils.  atorvastatin (LIPITOR) 40 mg tablet Take 40 mg by mouth nightly.  nitroglycerin (Nitrostat) 0.4 mg SL tablet 0.4 mg by SubLINGual route every five (5) minutes as needed for Chest Pain. Up to 3 doses.  aspirin delayed-release (Ecotrin Low Strength) 81 mg tablet Take 81 mg by mouth nightly.  clopidogreL (Plavix) 75 mg tab Take 75 mg by mouth daily (after breakfast).  carbidopa-levodopa (SINEMET)  mg per tablet Take 2 Tabs by mouth four (4) times daily.  acetaminophen (TYLENOL) 325 mg tablet Take 650 mg by mouth every six (6) hours as needed for Pain or Fever.  cyanocobalamin (VITAMIN B12) 100 mcg tablet Take 100 mcg by mouth daily.  vit C,K-Ec-dquxc-lutein-zeaxan (PRESERVISION AREDS-2) 358-046-06-1 mg-unit-mg-mg cap capsule Take 1 Cap by mouth two (2) times a day.  senna-docusate (SENNA WITH DOCUSATE SODIUM) 8.6-50 mg per tablet Take 1 Tab by mouth nightly.  Cholecalciferol, Vitamin D3, 1,000 unit cap Take 1,000 Units by mouth daily.  pantoprazole (PROTONIX) 40 mg tablet Take 40 mg by mouth Daily (before breakfast). Indications: h/o bleeding ulcer with nsaid    DULoxetine (CYMBALTA) 60 mg capsule Take 120 mg by mouth daily. Indications: Anxiousness associated with Depression    multivitamins-minerals-lutein (CENTRUM SILVER) tab tablet Take 1 Tab by mouth daily. Prior to Admission Medications   Prescriptions Last Dose Informant Patient Reported? Taking?    Cholecalciferol, Vitamin D3, 1,000 unit cap   Yes No   Sig: Take 1,000 Units by mouth daily. DULoxetine (CYMBALTA) 60 mg capsule   Yes No   Sig: Take 120 mg by mouth daily. Indications: Anxiousness associated with Depression   acetaminophen (TYLENOL) 325 mg tablet   Yes No   Sig: Take 650 mg by mouth every six (6) hours as needed for Pain or Fever. aspirin delayed-release (Ecotrin Low Strength) 81 mg tablet   Yes No   Sig: Take 81 mg by mouth nightly. atorvastatin (LIPITOR) 40 mg tablet   Yes No   Sig: Take 40 mg by mouth nightly. carbidopa-levodopa (SINEMET)  mg per tablet   No No   Sig: Take 2 Tabs by mouth four (4) times daily. clopidogreL (Plavix) 75 mg tab   Yes No   Sig: Take 75 mg by mouth daily (after breakfast). cyanocobalamin (VITAMIN B12) 100 mcg tablet   Yes No   Sig: Take 100 mcg by mouth daily. diazePAM (VALIUM) 2 mg tablet   Yes No   Sig: Take 2 mg by mouth three (3) times daily. Indications: CHRONIC PAIN   multivitamins-minerals-lutein (CENTRUM SILVER) tab tablet   Yes No   Sig: Take 1 Tab by mouth daily. nitroglycerin (Nitrostat) 0.4 mg SL tablet   Yes No   Si.4 mg by SubLINGual route every five (5) minutes as needed for Chest Pain. Up to 3 doses. pantoprazole (PROTONIX) 40 mg tablet   Yes No   Sig: Take 40 mg by mouth Daily (before breakfast). Indications: h/o bleeding ulcer with nsaid   senna-docusate (SENNA WITH DOCUSATE SODIUM) 8.6-50 mg per tablet   Yes No   Sig: Take 1 Tab by mouth nightly. vit C,N-Oa-akejb-lutein-zeaxan (PRESERVISION AREDS-2) 844-726-18-1 mg-unit-mg-mg cap capsule   Yes No   Sig: Take 1 Cap by mouth two (2) times a day.       Facility-Administered Medications: None      No Known Allergies    Labs:   Recent Results (from the past 24 hour(s))   CBC WITH AUTOMATED DIFF    Collection Time: 21  6:40 AM   Result Value Ref Range    WBC 8.9 3.6 - 11.0 K/uL    RBC 3.04 (L) 3.80 - 5.20 M/uL    HGB 9.4 (L) 11.5 - 16.0 g/dL    HCT 30.5 (L) 35.0 - 47.0 %    .3 (H) 80.0 - 99.0 FL MCH 30.9 26.0 - 34.0 PG    MCHC 30.8 30.0 - 36.5 g/dL    RDW 15.7 (H) 11.5 - 14.5 %    PLATELET 881 088 - 023 K/uL    MPV 9.0 8.9 - 12.9 FL    NRBC 0.0 0  WBC    ABSOLUTE NRBC 0.00 0.00 - 0.01 K/uL    NEUTROPHILS 76 (H) 32 - 75 %    LYMPHOCYTES 8 (L) 12 - 49 %    MONOCYTES 8 5 - 13 %    EOSINOPHILS 6 0 - 7 %    BASOPHILS 1 0 - 1 %    IMMATURE GRANULOCYTES 1 (H) 0.0 - 0.5 %    ABS. NEUTROPHILS 6.8 1.8 - 8.0 K/UL    ABS. LYMPHOCYTES 0.7 (L) 0.8 - 3.5 K/UL    ABS. MONOCYTES 0.7 0.0 - 1.0 K/UL    ABS. EOSINOPHILS 0.5 (H) 0.0 - 0.4 K/UL    ABS. BASOPHILS 0.1 0.0 - 0.1 K/UL    ABS. IMM. GRANS. 0.1 (H) 0.00 - 0.04 K/UL    DF SMEAR SCANNED      RBC COMMENTS OVALOCYTES  PRESENT        RBC COMMENTS ANISOCYTOSIS  1+       METABOLIC PANEL, BASIC    Collection Time: 04/23/21  6:40 AM   Result Value Ref Range    Sodium 139 136 - 145 mmol/L    Potassium 4.0 3.5 - 5.1 mmol/L    Chloride 104 97 - 108 mmol/L    CO2 25 21 - 32 mmol/L    Anion gap 10 5 - 15 mmol/L    Glucose 230 (H) 65 - 100 mg/dL    BUN 23 (H) 6 - 20 MG/DL    Creatinine 1.47 (H) 0.55 - 1.02 MG/DL    BUN/Creatinine ratio 16 12 - 20      GFR est AA 42 (L) >60 ml/min/1.73m2    GFR est non-AA 34 (L) >60 ml/min/1.73m2    Calcium 8.9 8.5 - 10.1 MG/DL   TROPONIN I    Collection Time: 04/23/21  6:40 AM   Result Value Ref Range    Troponin-I, Qt. <0.05 <0.05 ng/mL   NT-PRO BNP    Collection Time: 04/23/21  6:40 AM   Result Value Ref Range    NT pro-BNP 9,114 (H) <450 PG/ML   SAMPLES BEING HELD    Collection Time: 04/23/21  6:40 AM   Result Value Ref Range    SAMPLES BEING HELD 1RED 1BLU 2BC(SILV)     COMMENT        Add-on orders for these samples will be processed based on acceptable specimen integrity and analyte stability, which may vary by analyte.    LACTIC ACID    Collection Time: 04/23/21  6:41 AM   Result Value Ref Range    Lactic acid 3.0 (HH) 0.4 - 2.0 MMOL/L   EKG, 12 LEAD, INITIAL    Collection Time: 04/23/21  7:13 AM   Result Value Ref Range    Ventricular Rate 58 BPM    Atrial Rate 58 BPM    P-R Interval 172 ms    QRS Duration 124 ms    Q-T Interval 484 ms    QTC Calculation (Bezet) 475 ms    Calculated P Axis 35 degrees    Calculated R Axis -41 degrees    Calculated T Axis 96 degrees    Diagnosis       Sinus bradycardia  Left axis deviation  Septal infarct (cited on or before 01-APR-2021)  T wave abnormality, consider lateral ischemia  When compared with ECG of 01-APR-2021 15:04,  T wave inversion no longer evident in Anterior leads  Inverted T waves have replaced nonspecific T wave abnormality in Lateral   leads  QT has shortened  Confirmed by Paramjit Carson MD, Yesi Conner ([id number]) on 4/23/2021 7:29:27 AM     SARS-COV-2    Collection Time: 04/23/21  7:25 AM   Result Value Ref Range    SARS-CoV-2 Please find results under separate order     POC EG7    Collection Time: 04/23/21 10:19 AM   Result Value Ref Range    Calcium, ionized (POC) 1.25 1.12 - 1.32 mmol/L    FIO2 (POC) 40 %    pH (POC) 7.41 7.35 - 7.45      pCO2 (POC) 43.3 35.0 - 45.0 MMHG    pO2 (POC) 90 80 - 100 MMHG    HCO3 (POC) 27.1 (H) 22 - 26 MMOL/L    Base excess (POC) 2 mmol/L    sO2 (POC) 97 92 - 97 %    Site RIGHT RADIAL      Device: BIPAP      PEEP/CPAP (POC) 6 cmH2O    PIP (POC) 12      Allens test (POC) NO      Specimen type (POC) ARTERIAL      Spontaneous timed YES       No intake or output data in the 24 hours ending 04/23/21 1036   Patient Vitals for the past 24 hrs:   Temp Pulse Resp BP SpO2   04/23/21 0931     100 %   04/23/21 0907     100 %   04/23/21 0900     100 %   04/23/21 0842     100 %   04/23/21 0830  (!) 54 18 114/67 100 %   04/23/21 0800  (!) 52 20 (!) 152/60 100 %   04/23/21 0730  (!) 55 21 (!) 152/55 99 %   04/23/21 0704     100 %   04/23/21 0700  (!) 58 21 (!) 145/59 99 %   04/23/21 0645 97.9 °F (36.6 °C) (!) 51 30 (!) 115/49 97 %        General:    Alert, cooperative, no distress.   C/o feet pain   Psychiatric:   Normal Mood and slow affect    Eye/ENT:   Pupils equal, No asymmetry, Conjunctival pink. Able to hear voice at normal amplitude   Lungs:   Visibly symmetric chest expansion, No palpable tenderness. Clear to auscultation bilaterally. Heart:    Regular rate and rhythm, S1, S2 normal, no murmur, click, rub or gallop. No JVD, Normal palpable     peripheral pulses. No cyanosis. 2+ radial pulses B/L   Abdomen:    Soft, non-tender. Bowel sounds normal. No masses,  No organomegaly. Extremities:   Extremities normal, atraumatic. 2+ edema B/L LE   Neurologic:   CN II-XII grossly intact.  No gross focal deficits       Gerson Shine MD  4/23/2021   11:07 AM      [facility] Office:   [employer] Office:  [address] [place] [address] Dr Gerry Chicas [facility]  [address]     [address]  P: [phone]    P: [phone]  F: [phone]    F: [phone]

## 2021-04-23 NOTE — ED NOTES
TRANSFER - OUT REPORT:    Verbal report given to LAMONT Mcgovern (name) on [facility]  being transferred to [facility] (unit) for routine progression of care       Report consisted of patients Situation, Background, Assessment and   Recommendations(SBAR). Information from the following report(s) SBAR, ED Summary and MAR was reviewed with the receiving nurse. Lines:   Peripheral IV 04/23/21 Right Antecubital (Active)   Site Assessment Clean, dry, & intact 04/23/21 0710   Phlebitis Assessment 0 04/23/21 0710   Infiltration Assessment 0 04/23/21 0710   Dressing Status Clean, dry, & intact 04/23/21 0710   Dressing Type Transparent 04/23/21 0710        Opportunity for questions and clarification was provided.       Patient transported with:   Monitor  O2 @ 15 liters  Registered Nurse

## 2021-04-23 NOTE — ED TRIAGE NOTES
Patient arrives via [facility]. Patient is complaining of difficulty breathing since 0300 today. The patient has a history of [facility]. Room air sats reported to be 50-60%. She was 86% on 15 lpm via NRB on arrival. RT here to place patient on BiPAP at 0635. The patient has a large bruise to her right leg and groin that she says is from a procedure for her kidney that she had about a week ago.

## 2021-04-23 NOTE — NURSE NAVIGATOR
Chart reviewed by Heart Failure Nurse Navigator. Heart Failure database completed. EF:  Prior echo 4/3/21     ACEi/ARB/ARNi:     BB: coreg 3.125 mg twice daily    Aldosterone Antagonist:     Obstructive Sleep Apnea Screening: No   STOP-BANG score:   Referred to Sleep Medicine:     CRT not indicated, pt DNR. NYHA Functional Class IV on admission      Heart Failure Teach Back in Patient Education. Heart Failure Avoiding Triggers on Discharge Instructions. Cardiologist: Dr. Brian Osuna ([facility])      Post discharge follow up phone call to be made within 48-72 hours of discharge. READMISSION    Prior admission 3/29-4/9/21 - final dx HF  Discharging [address].  Patient was discharged to [facility]. Has cardiology follow up scheduled for 4/26/21 at 11:20 with Dr. Soraida Davidson. Patient arrives to ED via EMS with complaint of difficulty breathing. O2 saturations in 50% 60% in squad. On arrival to ED O2 sat 86% on 15 L on nonrebreather. Patient placed on Bipap. Patient has chronic heart failure with reduced ejection fraction (30/35%). Patient is DNR with advanced care plan on chart.

## 2021-04-23 NOTE — ED PROVIDER NOTES
Date of Service:  4/23/2021    Patient:  Katalina Torrez    Chief Complaint:  Respiratory Distress    HPI:  Katalina Torrez is a 79 y.o.  female who presents for evaluation of respiratory distress. Patient arrives from nursing home with complaints of shortness of breath that started last evening. She describes sudden onset shortness of breath. She notes chronic leg swelling, history of CHF. EMS arrived to find the patient hypoxic in the 60s to 80s. She arrives here in the mid 80s on facemask after receiving a DuoNeb treatment in route. Patient otherwise denies chest pain abdominal pain nausea vomiting diarrhea headaches fevers chills or other acute complaints.            Past Medical History:   Diagnosis Date    Anxiety disorder     Atrial fibrillation (HCC)     CAD (coronary artery disease) 2007    stents, CABG x 3v    Carotid stenosis     Cervical stenosis of spinal canal 07/2019    Chronic kidney disease     Cough     CVA (cerebral vascular accident) ([facility] [address] 07/2019    left lacunar infarct at head of caudate    Depression     AND CHRONIC ANXIETY    Diabetes (HCC)     GERD (gastroesophageal reflux disease)     High cholesterol     History of peptic ulcer     Bleeding ulcer with increased NSAID use    Hypertension     Left carotid stenosis 07/2019    s/p left CEA with Dr. Carmelo Nielsen, old 2007    PUD (peptic ulcer disease)     Stroke ([facility] [address]     Tremor     Valvular heart disease        Past Surgical History:   Procedure Laterality Date    CARDIAC SURG PROCEDURE UNLIST      CABG X3 VESSEL    HX CAROTID ENDARTERECTOMY  07/2019    HX CORONARY ARTERY BYPASS GRAFT      HX TONSILLECTOMY  1963    TOTAL KNEE ARTHROPLASTY Right 2015         Family History:   Problem Relation Age of Onset    Heart Attack Mother 72        Dec 80yo    Hypertension Mother     Other Father         Unknown    Parkinson's Disease Brother     Anesth Problems Neg Hx        Social History     Socioeconomic History    Marital status: SINGLE     Spouse name: Not on file    Number of children: Not on file    Years of education: Not on file    Highest education level: Not on file   Occupational History    Occupation: Retired realestate/teacher   Social Needs    Financial resource strain: Not on file    Food insecurity     Worry: Not on file     Inability: Not on file   [employer] needs     Medical: Not on file     Non-medical: Not on file   Tobacco Use    Smoking status: Former Smoker     Packs/day: 0.25     Years: 5.00     Pack years: 1.25     Types: Cigarettes     Quit date:      Years since quittin.3    Smokeless tobacco: Never Used   Substance and Sexual Activity    Alcohol use: Yes     Alcohol/week: 0.0 standard drinks     Comment: Rare    Drug use: No    Sexual activity: Not on file   Lifestyle    Physical activity     Days per week: Not on file     Minutes per session: Not on file    Stress: Not on file   Relationships    Social connections     Talks on phone: Not on file     Gets together: Not on file     Attends [employer]ology service: Not on file     Active member of club or organization: Not on file     Attends meetings of clubs or organizations: Not on file     Relationship status: Not on file    Intimate partner violence     Fear of current or ex partner: Not on file     Emotionally abused: Not on file     Physically abused: Not on file     Forced sexual activity: Not on file   Other Topics Concern    Not on file   Social History Narrative    Lives in [facility]         ALLERGIES: Patient has no known allergies. Review of Systems   Unable to perform ROS: Acuity of condition       There were no vitals filed for this visit. Physical Exam  Vitals signs and nursing note reviewed. Constitutional:       General: She is in acute distress. Appearance: Normal appearance. She is ill-appearing. She is not toxic-appearing or diaphoretic.    HENT:      Head: Normocephalic and atraumatic. Nose: Nose normal.      Mouth/Throat:      Mouth: Mucous membranes are moist.   Eyes:      General: No scleral icterus. Cardiovascular:      Rate and Rhythm: Normal rate. Pulses: Normal pulses. Pulmonary:      Effort: Respiratory distress present. Breath sounds: Rhonchi present. Abdominal:      General: Abdomen is flat. Musculoskeletal:      Right lower leg: Edema present. Left lower leg: Edema present. Skin:     General: Skin is warm. Capillary Refill: Capillary refill takes less than 2 seconds. Neurological:      Mental Status: She is alert and oriented to person, place, and time. Psychiatric:         Mood and Affect: Mood normal.          [facility]  ED Course as of Apr 23 0656   Fri Apr 23, 2021   0649 Patient improving on BiPap 97% Resp 19 improved from 86% @ 33BPM    [GG]      ED Course User Index  [GG] Venu Nicholson DO     VITAL SIGNS:  Patient Vitals for the past 4 hrs:   Temp Pulse Resp BP SpO2   04/23/21 0645 97.9 °F (36.6 °C) (!) 51 30 (!) 115/49 97 %         LABS:  Recent Results (from the past 6 hour(s))   SAMPLES BEING HELD    Collection Time: 04/23/21  6:40 AM   Result Value Ref Range    SAMPLES BEING HELD 1RED 1BLU 2BC(SILV)     COMMENT        Add-on orders for these samples will be processed based on acceptable specimen integrity and analyte stability, which may vary by analyte. IMAGING:  XR CHEST PORT    (Results Pending)         Medications During Visit:  Medications - No data to display      DECISION MAKING:  Yaya Muhammad is a 79 y.o. female who comes in as above. Patient shows signs of acute CHF with respiratory failure. Patient placed on BiPAP and has almost complete resolution of distress and o2 saturation is improving.     6:58 AM  Change of shift. Care of patient signed over to Dr. Treasure Kilpatrick. Bedside handoff complete. Awaiting labs and disposition.     Total critical care time spent exclusive of procedures:  35 minutes IMPRESSION:  1. Acute respiratory failure, unspecified whether with hypoxia or hypercapnia ([address]    2. Acute on chronic congestive heart failure, unspecified heart failure type ([address]        DISPOSITION:  Pending admit    Procedures  Patient was signed out to me by Dr. Shar Carreno at change of shift pending lab results and x-ray. Again, the patient appears to be improving with BiPAP. Her saturations are remaining at 100%. Her chest x-ray definitely shows increased interstitial markings and opacities compared to prior studies. She has been given diuretics. Hemodynamically the patient is remaining stable and again is speaking in full sentences now and feeling improved. She is to see cardiology. We will consult the hospitalist for admission and further evaluation and treatment. Perfect Serve Consult for Admission  8:41 AM    ED Room Number: ER06/06  Patient Name and age:  Flacofm Picking 79 y.o.  female  Working Diagnosis:   1. Acute respiratory failure, unspecified whether with hypoxia or hypercapnia ([address]    2.  Acute on chronic congestive heart failure, unspecified heart failure type ([address]        COVID-19 Suspicion:  no  Sepsis present:  no  Reassessment needed: no  Code Status:  Full Code  Readmission: no  Isolation Requirements:  no  Recommended Level of Care:  telemetry  Department:[facility] Adult ED - 21   Other:  Cardiology to see

## 2021-04-23 NOTE — PROGRESS NOTES
04/23/21 0931   Oxygen Therapy   O2 Sat (%) 100 %   Pulse via Oximetry 61 beats per minute   O2 Device BIPAP   FIO2 (%) 50 %  (decrease to 40%)

## 2021-04-24 ENCOUNTER — APPOINTMENT (OUTPATIENT)
Dept: VASCULAR SURGERY | Age: 81
DRG: 291 | End: 2021-04-24
Attending: FAMILY MEDICINE
Payer: MEDICARE

## 2021-04-24 LAB
ALBUMIN SERPL-MCNC: 3.1 G/DL (ref 3.5–5)
ALBUMIN/GLOB SERPL: 1.1 {RATIO} (ref 1.1–2.2)
ALP SERPL-CCNC: 88 U/L (ref 45–117)
ALT SERPL-CCNC: 7 U/L (ref 12–78)
ANION GAP SERPL CALC-SCNC: 6 MMOL/L (ref 5–15)
AST SERPL-CCNC: 18 U/L (ref 15–37)
BASOPHILS # BLD: 0.1 K/UL (ref 0–0.1)
BASOPHILS NFR BLD: 1 % (ref 0–1)
BILIRUB SERPL-MCNC: 1 MG/DL (ref 0.2–1)
BUN SERPL-MCNC: 23 MG/DL (ref 6–20)
BUN/CREAT SERPL: 18 (ref 12–20)
CALCIUM SERPL-MCNC: 9.3 MG/DL (ref 8.5–10.1)
CHLORIDE SERPL-SCNC: 103 MMOL/L (ref 97–108)
CO2 SERPL-SCNC: 30 MMOL/L (ref 21–32)
CREAT SERPL-MCNC: 1.25 MG/DL (ref 0.55–1.02)
DIFFERENTIAL METHOD BLD: ABNORMAL
EOSINOPHIL # BLD: 0.2 K/UL (ref 0–0.4)
EOSINOPHIL NFR BLD: 5 % (ref 0–7)
ERYTHROCYTE [DISTWIDTH] IN BLOOD BY AUTOMATED COUNT: 15.2 % (ref 11.5–14.5)
GLOBULIN SER CALC-MCNC: 2.9 G/DL (ref 2–4)
GLUCOSE BLD STRIP.AUTO-MCNC: 111 MG/DL (ref 65–100)
GLUCOSE BLD STRIP.AUTO-MCNC: 120 MG/DL (ref 65–100)
GLUCOSE BLD STRIP.AUTO-MCNC: 176 MG/DL (ref 65–100)
GLUCOSE SERPL-MCNC: 102 MG/DL (ref 65–100)
HCT VFR BLD AUTO: 25.6 % (ref 35–47)
HGB BLD-MCNC: 8 G/DL (ref 11.5–16)
IMM GRANULOCYTES # BLD AUTO: 0 K/UL (ref 0–0.04)
IMM GRANULOCYTES NFR BLD AUTO: 0 % (ref 0–0.5)
LYMPHOCYTES # BLD: 0.8 K/UL (ref 0.8–3.5)
LYMPHOCYTES NFR BLD: 16 % (ref 12–49)
MCH RBC QN AUTO: 30.7 PG (ref 26–34)
MCHC RBC AUTO-ENTMCNC: 31.3 G/DL (ref 30–36.5)
MCV RBC AUTO: 98.1 FL (ref 80–99)
MONOCYTES # BLD: 0.6 K/UL (ref 0–1)
MONOCYTES NFR BLD: 12 % (ref 5–13)
NEUTS SEG # BLD: 3.4 K/UL (ref 1.8–8)
NEUTS SEG NFR BLD: 66 % (ref 32–75)
NRBC # BLD: 0 K/UL (ref 0–0.01)
NRBC BLD-RTO: 0 PER 100 WBC
PLATELET # BLD AUTO: 270 K/UL (ref 150–400)
PMV BLD AUTO: 8.9 FL (ref 8.9–12.9)
POTASSIUM SERPL-SCNC: 3.2 MMOL/L (ref 3.5–5.1)
PROT SERPL-MCNC: 6 G/DL (ref 6.4–8.2)
RBC # BLD AUTO: 2.61 M/UL (ref 3.8–5.2)
SERVICE CMNT-IMP: ABNORMAL
SODIUM SERPL-SCNC: 139 MMOL/L (ref 136–145)
TROPONIN I SERPL-MCNC: 0.05 NG/ML
WBC # BLD AUTO: 5.2 K/UL (ref 3.6–11)

## 2021-04-24 PROCEDURE — 82962 GLUCOSE BLOOD TEST: CPT

## 2021-04-24 PROCEDURE — 74011250636 HC RX REV CODE- 250/636: Performed by: FAMILY MEDICINE

## 2021-04-24 PROCEDURE — 74011250637 HC RX REV CODE- 250/637: Performed by: FAMILY MEDICINE

## 2021-04-24 PROCEDURE — 94640 AIRWAY INHALATION TREATMENT: CPT

## 2021-04-24 PROCEDURE — 74011250637 HC RX REV CODE- 250/637: Performed by: INTERNAL MEDICINE

## 2021-04-24 PROCEDURE — 36415 COLL VENOUS BLD VENIPUNCTURE: CPT

## 2021-04-24 PROCEDURE — 85025 COMPLETE CBC W/AUTO DIFF WBC: CPT

## 2021-04-24 PROCEDURE — 99233 SBSQ HOSP IP/OBS HIGH 50: CPT | Performed by: INTERNAL MEDICINE

## 2021-04-24 PROCEDURE — 77010033678 HC OXYGEN DAILY

## 2021-04-24 PROCEDURE — 84484 ASSAY OF TROPONIN QUANT: CPT

## 2021-04-24 PROCEDURE — 65660000001 HC RM ICU INTERMED STEPDOWN

## 2021-04-24 PROCEDURE — 93970 EXTREMITY STUDY: CPT

## 2021-04-24 PROCEDURE — 80053 COMPREHEN METABOLIC PANEL: CPT

## 2021-04-24 PROCEDURE — 74011000250 HC RX REV CODE- 250: Performed by: FAMILY MEDICINE

## 2021-04-24 PROCEDURE — 94760 N-INVAS EAR/PLS OXIMETRY 1: CPT

## 2021-04-24 RX ORDER — POTASSIUM CHLORIDE 750 MG/1
40 TABLET, FILM COATED, EXTENDED RELEASE ORAL
Status: DISCONTINUED | OUTPATIENT
Start: 2021-04-24 | End: 2021-04-24

## 2021-04-24 RX ORDER — POTASSIUM CHLORIDE 750 MG/1
40 TABLET, FILM COATED, EXTENDED RELEASE ORAL DAILY
Status: COMPLETED | OUTPATIENT
Start: 2021-04-24 | End: 2021-04-25

## 2021-04-24 RX ORDER — SPIRONOLACTONE 25 MG/1
25 TABLET ORAL DAILY
Status: DISCONTINUED | OUTPATIENT
Start: 2021-04-24 | End: 2021-04-28

## 2021-04-24 RX ORDER — ALBUTEROL SULFATE 0.83 MG/ML
2.5 SOLUTION RESPIRATORY (INHALATION)
Status: DISCONTINUED | OUTPATIENT
Start: 2021-04-24 | End: 2021-04-30 | Stop reason: HOSPADM

## 2021-04-24 RX ADMIN — CARBIDOPA AND LEVODOPA 2 TABLET: 25; 100 TABLET ORAL at 21:07

## 2021-04-24 RX ADMIN — Medication 10 ML: at 06:49

## 2021-04-24 RX ADMIN — CARBIDOPA AND LEVODOPA 2 TABLET: 25; 100 TABLET ORAL at 09:40

## 2021-04-24 RX ADMIN — ALBUTEROL SULFATE 2.5 MG: 2.5 SOLUTION RESPIRATORY (INHALATION) at 04:39

## 2021-04-24 RX ADMIN — HEPARIN SODIUM 5000 UNITS: 5000 INJECTION INTRAVENOUS; SUBCUTANEOUS at 13:45

## 2021-04-24 RX ADMIN — POTASSIUM CHLORIDE 40 MEQ: 750 TABLET, FILM COATED, EXTENDED RELEASE ORAL at 09:40

## 2021-04-24 RX ADMIN — HEPARIN SODIUM 5000 UNITS: 5000 INJECTION INTRAVENOUS; SUBCUTANEOUS at 04:53

## 2021-04-24 RX ADMIN — CARBIDOPA AND LEVODOPA 2 TABLET: 25; 100 TABLET ORAL at 17:00

## 2021-04-24 RX ADMIN — ASPIRIN 81 MG: 81 TABLET, COATED ORAL at 21:07

## 2021-04-24 RX ADMIN — SACUBITRIL AND VALSARTAN 1 TABLET: 24; 26 TABLET, FILM COATED ORAL at 21:07

## 2021-04-24 RX ADMIN — BUMETANIDE 1 MG: 0.25 INJECTION INTRAMUSCULAR; INTRAVENOUS at 09:40

## 2021-04-24 RX ADMIN — CARVEDILOL 3.12 MG: 3.12 TABLET, FILM COATED ORAL at 16:58

## 2021-04-24 RX ADMIN — CARBIDOPA AND LEVODOPA 2 TABLET: 25; 100 TABLET ORAL at 13:45

## 2021-04-24 RX ADMIN — BUMETANIDE 1 MG: 0.25 INJECTION INTRAMUSCULAR; INTRAVENOUS at 17:00

## 2021-04-24 RX ADMIN — Medication 10 ML: at 13:47

## 2021-04-24 RX ADMIN — ATORVASTATIN CALCIUM 40 MG: 40 TABLET, FILM COATED ORAL at 21:07

## 2021-04-24 RX ADMIN — CLOPIDOGREL BISULFATE 75 MG: 75 TABLET ORAL at 09:40

## 2021-04-24 RX ADMIN — CARVEDILOL 3.12 MG: 3.12 TABLET, FILM COATED ORAL at 09:40

## 2021-04-24 RX ADMIN — SPIRONOLACTONE 25 MG: 25 TABLET ORAL at 09:47

## 2021-04-24 RX ADMIN — Medication 10 ML: at 21:11

## 2021-04-24 RX ADMIN — ALBUTEROL SULFATE 2.5 MG: 2.5 SOLUTION RESPIRATORY (INHALATION) at 07:58

## 2021-04-24 RX ADMIN — DULOXETINE HYDROCHLORIDE 120 MG: 60 CAPSULE, DELAYED RELEASE ORAL at 09:40

## 2021-04-24 RX ADMIN — HEPARIN SODIUM 5000 UNITS: 5000 INJECTION INTRAVENOUS; SUBCUTANEOUS at 21:07

## 2021-04-24 RX ADMIN — PANTOPRAZOLE SODIUM 40 MG: 40 TABLET, DELAYED RELEASE ORAL at 06:49

## 2021-04-24 RX ADMIN — SACUBITRIL AND VALSARTAN 1 TABLET: 24; 26 TABLET, FILM COATED ORAL at 09:47

## 2021-04-24 NOTE — PROGRESS NOTES
1930; Bedside shift change report given to Antonio Kelsey (oncoming nurse) by RN (offgoing nurse). Report included the following information SBAR, Kardex, Intake/Output, MAR, Recent Results, and Cardiac Rhythm NSR . Problem: Breathing Pattern - Ineffective  Goal: *Absence of hypoxia  Outcome: Progressing Towards Goal  Note: Pt on 2L NC. O2 sats in mid 90s. Problem: Falls - Risk of  Goal: *Absence of Falls  Description: Document Devere Milan Fall Risk and appropriate interventions in the flowsheet.   Outcome: Progressing Towards Goal  Note: Fall Risk Interventions:  Mobility Interventions: Communicate number of staff needed for ambulation/transfer, Patient to call before getting OOB         Medication Interventions: Patient to call before getting OOB, Teach patient to arise slowly    Elimination Interventions: Call light in reach, Patient to call for help with toileting needs

## 2021-04-24 NOTE — PROGRESS NOTES
ADULT PROTOCOL: JET AEROSOL ASSESSMENT    Patient  Willow Sauceda     79 y.o.   female     4/24/2021  11:08 AM    Breath Sounds Pre Procedure:                                      Breath Sounds Post Procedure:                                        Breathing pattern: Pre procedure Breathing Pattern: Regular          Post procedure      Heart Rate: Pre procedure Pulse: 59           Post procedure      Resp Rate: Pre procedure Respirations: 20           Post procedure      Peak Flow: Pre bronchodilator             Post bronchodilator       FVC/FEV1:      Incentive Spirometry:             Cough: Pre procedure                 Post procedure      Suctioned: NO    Sputum: Pre procedure                   Post procedure      Oxygen: O2 Device: Nasal cannula   Flow rate (L/min) 1.5     Changed: NO    SpO2: Pre procedure SpO2: 99 %   with oxygen              Post procedure    with oxygen    Nebulizer Therapy: Current medications Aerosolized Medications: Albuterol      Changed: NO        Problem List:   Patient Active Problem List   Diagnosis Code    Hypotension I95.9    Coronary artery disease I25.10    S/P coronary artery stent placement Z95.5    Renal failure N19    Elevated troponin R77.8    Pericardial effusion I31.3    Lactic acidosis E87.2    AF (atrial fibrillation) ([facility]) I48.91    Anemia D64.9    GI bleed K92.2    Syncope R55    Bradycardia R00.1    Acute kidney injury ([facility] [address] N17.9    Postoperative anemia due to acute blood loss D62    S/P CABG (coronary artery bypass graft) Z95.1    Edema R60.9    Diabetes mellitus ([facility]) E11.9    CKD (chronic kidney disease), stage III ([facility]) N18.30    Fall W19. Willie [place]    Acute ischemic stroke ([facility] [address] I63.9    HTN (hypertension) I10    Carotid artery stenosis, symptomatic, left I65.22    Generalized weakness R53.1    Weakness R53.1    CHF (congestive heart failure) ([facility]) I50.9    CHF exacerbation ([facility]) I50.9    UTI (urinary tract infection) N39.0    SOB (shortness of breath) R06.02     Spacing per protocol to prn.     Respiratory Therapist: Lidya Plummer

## 2021-04-24 NOTE — PROGRESS NOTES
Progress Note    Patient: Beverley Payton MRN: [id number]  SSN: [id number]    YOB: 1940  Age: 79 y.o. Sex: female      Admit Date: 4/23/2021    LOS: 1 day      Assessment/plan:  1. Acute on chronic systolic heart failure. Patient is diuresing well, and has had urine output of 3275 mL documented since admission yesterday. Patient is on diuretics with Bumex 1 mg IV twice daily,  spironolactone 25 mg p.o. daily, nonselective beta-blocker with Coreg 3.125 mg p.o. twice daily. [address] cardiology consultation. ,  She was started on Entresto, April 24, by cardiologist, Dr. Craig Anna. 2.  Coronary artery disease. S/p CABG. Continue Plavix, aspirin, Coreg, atorvastatin,  3. Chronic left bundle branch block. Per cardiologist, this is contributing to low LVEF. 4. Parkinson's disease. Continue carbidopa-levodopa. 5.  Hypokalemia. Patient has been started on potassium supplementation with 40 mEq daily X2 doses by Dr Misty Burgess. 6.  Chronic kidney disease stage III. Now close to baseline.           Current Facility-Administered Medications:     sacubitriL-valsartan (ENTRESTO) 24-26 mg tablet 1 Tab, 1 Tab, Oral, Q12H, Diaz Daniels MD, 1 Tab at 04/24/21 0947    spironolactone (ALDACTONE) tablet 25 mg, 25 mg, Oral, DAILY, Diaz Daniels MD, 25 mg at 04/24/21 0947    potassium chloride SR (KLOR-CON 10) tablet 40 mEq, 40 mEq, Oral, DAILY, Diaz Daniels MD, 40 mEq at 04/24/21 0940    albuterol (PROVENTIL VENTOLIN) nebulizer solution 2.5 mg, 2.5 mg, Nebulization, Q4H PRN, Diaz Daniels MD    aspirin delayed-release tablet 81 mg, 81 mg, Oral, QHS, Joesph Dockery MD, 81 mg at 04/23/21 2111    atorvastatin (LIPITOR) tablet 40 mg, 40 mg, Oral, QHS, Joesph Dockery MD, 40 mg at 04/23/21 2111    carbidopa-levodopa (SINEMET)  mg per tablet 2 Tab, 2 Tab, Oral, QID, Jarod Abdullahi MD, 2 Tab at 04/24/21 1700    carvediloL (COREG) tablet 3.125 mg, 3.125 mg, Oral, BID WITH MEALS, Nahed Warner Fuller MD, 3.125 mg at 04/24/21 1658    clopidogreL (PLAVIX) tablet 75 mg, 75 mg, Oral, DAILY AFTER BREAKFAST, Heike Hidalgo MD, 75 mg at 04/24/21 0940    DULoxetine (CYMBALTA) capsule 120 mg, 120 mg, Oral, DAILY, Heike Hidalgo MD, 120 mg at 04/24/21 0940    pantoprazole (PROTONIX) tablet 40 mg, 40 mg, Oral, ACB, Heike Hidalgo MD, 40 mg at 04/24/21 0649    sodium chloride (NS) flush 5-40 mL, 5-40 mL, IntraVENous, Q8H, Joesph Dockery MD, 10 mL at 04/24/21 1347    sodium chloride (NS) flush 5-40 mL, 5-40 mL, IntraVENous, PRN, Heike Hidalgo MD    bumetanide (BUMEX) injection 1 mg, 1 mg, IntraVENous, BID, Joesph Dockery MD, 1 mg at 04/24/21 1700    nitroglycerin (NITROSTAT) tablet 0.4 mg, 0.4 mg, SubLINGual, Q5MIN PRN, Heike Hidalgo MD    acetaminophen (TYLENOL) tablet 650 mg, 650 mg, Oral, Q4H PRN, Heike Hidalgo MD    heparin (porcine) injection 5,000 Units, 5,000 Units, SubCUTAneous, Q8H, Heike iHdalgo MD, 5,000 Units at 04/24/21 1345    Interval summary: Patient was admitted with hypoxia, acute on chronic systolic heart failure on 4/23/2021, and placed on BiPAP at time of admission on 4/23/2021. Late last night, she was taken off BiPAP, then placed on nonrebreather for a few minutes before being placed on 3 L/min oxygen via nasal cannula at 7 PM on 4/23/2021. This morning, the flow rate was turned down to 1.5 L/min around 8 AM.    Subjective:     She states that she is feeling much better, and does not feel particularly short of breath at this time. At time of examination, she is on 1.5 L/min oxygen via nasal cannula. Patient states that she usually does not use supplemental oxygen at the skilled nursing facility at baseline. Patient states that she does not walk very much, because \" there is swelling in her feet\". Patient denies numbness in her feet. Patient states that her ankles swell on and off but she does not always have shortness of breath associated with the ankle swelling. Patient denies chest pain, palpitations, lightheadedness or dizziness. Patient denies coughing, congestion. Objective:     Patient Vitals for the past 24 hrs:   BP Temp Pulse Resp SpO2  oxygen therapy   04/24/21 1656 (!) 143/73 98.5 °F (36.9 °C) 61 18 99 %     04/24/21 1600   (!) 59      04/24/21 1200   60      04/24/21 1118 (!) 128/50 97.9 °F (36.6 °C) 62 19 98 %     04/24/21 0817 (!) 171/56 98.6 °F (37 °C) (!) 59 25 100 %  1.5 liters/minute   04/24/21 0800   69      04/24/21 0758     99 %   3 liters per minute   04/24/21 0439     99 %  3 liters per minute   04/24/21 0400 (!) 154/57 97.7 °F (36.5 °C) (!) 52 17 99 %  3 liters per minute   04/23/21 2304 (!) 143/53 97.6 °F (36.4 °C) (!) 52 15 100 %  3 liters per minute   04/23/21 2300     100 %  3 liters per minute   04/23/21 1930         04/23/21 1916     100 %  3 liters per minute   04/23/21 1900 (!) 155/58 98.7 °F (37.1 °C) (!) 59 19 99 %  3 liters per minute   04/23/21 1855 (!) 166/70  (!) 57 19 100 %  nonrebreather     Intake and Output:  Current Shift: No intake/output data recorded. Last three shifts: 04/22 1901 - 04/24 0700  In: -   Out: 6455 [Urine:2575]    Physical Exam:   Estimated body mass index is 25.02 kg/m² as calculated from the following:    Height as of this encounter: 5' 5\" (1.651 m). Weight as of this encounter: 68.2 kg (150 lb 5.7 oz). General: In no acute distress. Well developed, well nourished. Head: Normocephalic, atraumatic. Eyes: Anicteric sclera. PERRL. Extraocular muscles intact. ENT: External ears and nose appear normal.  Oral mucosa moist.  Neck: Supple. No jugular venous distention. Heart: Regular rate and rhythm. Grade 2/6 systolic ejection murmur. Chest: Symmetrical excursion. Clear to auscultation bilaterally. Abdomen: Soft, nontender. No abnormal distention. Bowel sounds are present throughout. Extremities: No gross deformities. No edema, no cyanosis.   Feet are warm to touch.  Neurological: Patient is moving all extremities with at least antigravity strength. Alert, oriented X3. Skin: No jaundice. No rashes. Lab/Data Review:  Recent Results (from the past 24 hour(s))   METABOLIC PANEL, COMPREHENSIVE    Collection Time: 04/24/21  4:54 AM   Result Value Ref Range    Sodium 139 136 - 145 mmol/L    Potassium 3.2 (L) 3.5 - 5.1 mmol/L    Chloride 103 97 - 108 mmol/L    CO2 30 21 - 32 mmol/L    Anion gap 6 5 - 15 mmol/L    Glucose 102 (H) 65 - 100 mg/dL    BUN 23 (H) 6 - 20 MG/DL    Creatinine 1.25 (H) 0.55 - 1.02 MG/DL    BUN/Creatinine ratio 18 12 - 20      GFR est AA 50 (L) >60 ml/min/1.73m2    GFR est non-AA 41 (L) >60 ml/min/1.73m2    Calcium 9.3 8.5 - 10.1 MG/DL    Bilirubin, total 1.0 0.2 - 1.0 MG/DL    ALT (SGPT) 7 (L) 12 - 78 U/L    AST (SGOT) 18 15 - 37 U/L    Alk. phosphatase 88 45 - 117 U/L    Protein, total 6.0 (L) 6.4 - 8.2 g/dL    Albumin 3.1 (L) 3.5 - 5.0 g/dL    Globulin 2.9 2.0 - 4.0 g/dL    A-G Ratio 1.1 1.1 - 2.2     CBC WITH AUTOMATED DIFF    Collection Time: 04/24/21  4:54 AM   Result Value Ref Range    WBC 5.2 3.6 - 11.0 K/uL    RBC 2.61 (L) 3.80 - 5.20 M/uL    HGB 8.0 (L) 11.5 - 16.0 g/dL    HCT 25.6 (L) 35.0 - 47.0 %    MCV 98.1 80.0 - 99.0 FL    MCH 30.7 26.0 - 34.0 PG    MCHC 31.3 30.0 - 36.5 g/dL    RDW 15.2 (H) 11.5 - 14.5 %    PLATELET 581 370 - 654 K/uL    MPV 8.9 8.9 - 12.9 FL    NRBC 0.0 0  WBC    ABSOLUTE NRBC 0.00 0.00 - 0.01 K/uL    NEUTROPHILS 66 32 - 75 %    LYMPHOCYTES 16 12 - 49 %    MONOCYTES 12 5 - 13 %    EOSINOPHILS 5 0 - 7 %    BASOPHILS 1 0 - 1 %    IMMATURE GRANULOCYTES 0 0.0 - 0.5 %    ABS. NEUTROPHILS 3.4 1.8 - 8.0 K/UL    ABS. LYMPHOCYTES 0.8 0.8 - 3.5 K/UL    ABS. MONOCYTES 0.6 0.0 - 1.0 K/UL    ABS. EOSINOPHILS 0.2 0.0 - 0.4 K/UL    ABS. BASOPHILS 0.1 0.0 - 0.1 K/UL    ABS. IMM.  GRANS. 0.0 0.00 - 0.04 K/UL    DF AUTOMATED     TROPONIN I    Collection Time: 04/24/21  4:54 AM   Result Value Ref Range    Troponin-I, Qt. 0.05 (H) <0.05 ng/mL   GLUCOSE, POC    Collection Time: 04/24/21  8:45 AM   Result Value Ref Range    Glucose (POC) 111 (H) 65 - 100 mg/dL    Performed by Chrissie JACOBSON (CON)    GLUCOSE, POC    Collection Time: 04/24/21 11:16 AM   Result Value Ref Range    Glucose (POC) 176 (H) 65 - 100 mg/dL    Performed by Chrissie JACOBSON (CON)    GLUCOSE, POC    Collection Time: 04/24/21  4:54 PM   Result Value Ref Range    Glucose (POC) 120 (H) 65 - 100 mg/dL    Performed by Edgar Dill        Signed By: Nolan Barber DO     April 24, 2021

## 2021-04-24 NOTE — PROGRESS NOTES
Cardiology Progress Note         4/24/2021 9:12 AM    Admit Date: 4/23/2021    Admit Diagnosis: SOB (shortness of breath) [R06.02]      Subjective:     Dona Graham is hungry this morning  She denies chest pain shortness of breath orthopnea  Dr Keegan Stevens and Divya Fontanez had seen her    03/29/21   ECHO ADULT FOLLOW-UP OR LIMITED 04/03/2021 4/3/2021    Narrative · Contrast used: DEFINITY. · LV: Estimated LVEF is 30 - 35%. Visually measured ejection fraction. Mildly dilated left ventricle. Increased wall thickness. Moderately   reduced systolic function. Abnormal left ventricular septal motion. Left   ventricular diastolic dysfunction. · LA: Moderately dilated left atrium. · RV: Mildly dilated right ventricle. Mildly reduced systolic function. · RA: Mildly dilated right atrium. · Limited echocardiogram without assessment of valves. · Left ventricular systolic function improved compared to prior echo of   3/31/2021. Signed by: Maribell Robbins MD     ECG NSR IVCD, previously LBBB    She is a patient of Dr. Keegan Stevens and Dr. Maria Del Carmen Francis who has history of coronary artery disease, is status post coronary artery bypass grafting and has had recurrent hospitalizations for congestive heart failure. She is currently admitted to the hospital with progressive shortness of breath of 2 days duration. Right before being brought to the emergency room, she was extremely hypoxemic with significant orthopnea and PND. No chest pain was reported by the patient. Therefore Dr. Divya Fontanez did not think that she have in-stent restenosis or thrombosis  Notably she had history of hypertensive heart disease as well with previously uncontrolled blood pressures. She has moderate renal disease. EF has been ranging between 30 to 50% based on whether is performed when she is in the left bundle branch block versus narrow conduction. 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   Moreover a few weeks ago she had presented to the hospital with chest discomfort during which she had PCI performed to her native LAD through LIMA graft. She has residual small vessel disease which was deferred. She was discharged after optimizing her blood pressure and heart failure therapies. Plan was to consider cardiac resynchronization therapy downstream assuming her EF and heart failure symptoms do not improve. She also underwent a renal angiogram because of persistent hypertension which demonstrated moderate renal arterial disease but not severe. Problem List  Date Reviewed: 5/1/2020          Codes Class Noted    SOB (shortness of breath) ICD-10-CM: R06.02  ICD-9-CM: 786.05  4/23/2021        UTI (urinary tract infection) ICD-10-CM: N39.0  ICD-9-CM: 599.0  4/9/2021        CHF exacerbation (HCC) ICD-10-CM: I50.9  ICD-9-CM: 428.0  9/25/2020        CHF (congestive heart failure) (HCC) ICD-10-CM: I50.9  ICD-9-CM: 428.0  9/22/2020        Weakness ICD-10-CM: R53.1  ICD-9-CM: 780.79  5/4/2020        Generalized weakness ICD-10-CM: R53.1  ICD-9-CM: 780.79  4/30/2020        Carotid artery stenosis, symptomatic, left ICD-10-CM: L89.99  ICD-9-CM: 433.10  7/26/2019        HTN (hypertension) ICD-10-CM: I10  ICD-9-CM: 401.9  7/17/2019        Acute ischemic stroke [facility]) ICD-10-CM: I63.9  ICD-9-CM: 434.91  7/12/2019        Fall ICD-10-CM: K02. Roneniel Hamper  ICD-9-CM: E888.9  12/24/2018        CKD (chronic kidney disease), stage III ([facility] [address] ICD-10-CM: N18.30  ICD-9-CM: 585.3  7/8/2015        Edema ICD-10-CM: R60.9  ICD-9-CM: 782.3  5/6/2015        Diabetes mellitus ([facility] ICD-10-CM: E11.9  ICD-9-CM: 250.00  5/6/2015        Postoperative anemia due to acute blood loss ICD-10-CM: D62  ICD-9-CM: 285.1  2/14/2015        S/P CABG (coronary artery bypass graft) ICD-10-CM: Z95.1  ICD-9-CM: V45.81  2/13/2015        Syncope ICD-10-CM: R55  ICD-9-CM: 780.2  2/9/2015        Bradycardia ICD-10-CM: R00.1  ICD-9-CM: 427.89  2/9/2015        Acute kidney injury [facility]) ICD-10-CM: N17.9  ICD-9-CM: 584.9  2/9/2015        Anemia ICD-10-CM: D64.9  ICD-9-CM: 285.9  9/9/2014        GI bleed ICD-10-CM: K92.2  ICD-9-CM: 578.9  9/9/2014        AF (atrial fibrillation) (HCC) ICD-10-CM: I48.91  ICD-9-CM: 427.31  6/20/2014        Hypotension ICD-10-CM: I95.9  ICD-9-CM: 458.9  6/3/2014        Coronary artery disease ICD-10-CM: I25.10  ICD-9-CM: 414.00  6/3/2014    Overview Signed 2/10/2015 11:06 AM by Fam Lopez     2007 PCI x2 to LAD with STEPHAN             S/P coronary artery stent placement ICD-10-CM: Z95.5  ICD-9-CM: V45.82  6/3/2014        Renal failure ICD-10-CM: N19  ICD-9-CM: 091  6/3/2014        Elevated troponin ICD-10-CM: R77.8  ICD-9-CM: 790.6  6/3/2014        Pericardial effusion ICD-10-CM: I31.3  ICD-9-CM: 423.9  6/3/2014        Lactic acidosis ICD-10-CM: E87.2  ICD-9-CM: 276.2  6/3/2014            Past Medical History:   Diagnosis Date    Anxiety disorder     Atrial fibrillation ([facility]     CAD (coronary artery disease) 2007    stents, CABG x 3v    Carotid stenosis     Cervical stenosis of spinal canal 07/2019    Chronic kidney disease     Cough     CVA (cerebral vascular accident) ([address] 07/2019    left lacunar infarct at head of caudate    Depression     AND CHRONIC ANXIETY    Diabetes ([facility] [address]     GERD (gastroesophageal reflux disease)     High cholesterol     History of peptic ulcer     Bleeding ulcer with increased NSAID use    Hypertension     Left carotid stenosis 07/2019    s/p left CEA with Dr. Velia Campbell, old 2007    PUD (peptic ulcer disease)     Stroke ([facility] [address]     Tremor     Valvular heart disease      Past Surgical History:   Procedure Laterality Date    HX CAROTID ENDARTERECTOMY  07/2019    HX CORONARY ARTERY BYPASS GRAFT      HX TONSILLECTOMY  1963    MO CARDIAC SURG PROCEDURE UNLIST      CABG X3 VESSEL    MO TOTAL KNEE ARTHROPLASTY Right 2015     Social History     Socioeconomic History    Marital status: SINGLE     Spouse name: Not on file  Number of children: Not on file    Years of education: Not on file    Highest education level: Not on file   Occupational History    Occupation: Retired realestate/teacher   Social Needs    Financial resource strain: Not on file    Food insecurity     Worry: Not on file     Inability: Not on file   [employer] needs     Medical: Not on file     Non-medical: Not on file   Tobacco Use    Smoking status: Former Smoker     Packs/day: 0.25     Years: 5.00     Pack years: 1.25     Types: Cigarettes     Quit date:      Years since quittin.3    Smokeless tobacco: Never Used   Substance and Sexual Activity    Alcohol use:  Yes     Alcohol/week: 0.0 standard drinks     Comment: Rare    Drug use: No    Sexual activity: Not on file   Lifestyle    Physical activity     Days per week: Not on file     Minutes per session: Not on file    Stress: Not on file   Relationships    Social connections     Talks on phone: Not on file     Gets together: Not on file     Attends Zoroastrianism service: Not on file     Active member of club or organization: Not on file     Attends meetings of clubs or organizations: Not on file     Relationship status: Not on file    Intimate partner violence     Fear of current or ex partner: Not on file     Emotionally abused: Not on file     Physically abused: Not on file     Forced sexual activity: Not on file   Other Topics Concern    Not on file   Social History Narrative    [facility]     Family History   Problem Relation Age of Onset    Heart Attack Mother 72        Dec 81yo    Hypertension Mother     Other Father         Unknown    Parkinson's Disease Brother     Anesth Problems Neg Hx          Current Facility-Administered Medications   Medication Dose Route Frequency    aspirin delayed-release tablet 81 mg  81 mg Oral QHS    atorvastatin (LIPITOR) tablet 40 mg  40 mg Oral QHS    carbidopa-levodopa (SINEMET)  mg per tablet 2 Tab  2 Tab Oral QID  carvediloL (COREG) tablet 3.125 mg  3.125 mg Oral BID WITH MEALS    clopidogreL (PLAVIX) tablet 75 mg  75 mg Oral DAILY AFTER BREAKFAST    DULoxetine (CYMBALTA) capsule 120 mg  120 mg Oral DAILY    hydrALAZINE (APRESOLINE) tablet 37.5 mg  37.5 mg Oral TID    pantoprazole (PROTONIX) tablet 40 mg  40 mg Oral ACB    sodium chloride (NS) flush 5-40 mL  5-40 mL IntraVENous Q8H    sodium chloride (NS) flush 5-40 mL  5-40 mL IntraVENous PRN    bumetanide (BUMEX) injection 1 mg  1 mg IntraVENous BID    nitroglycerin (NITROSTAT) tablet 0.4 mg  0.4 mg SubLINGual Q5MIN PRN    acetaminophen (TYLENOL) tablet 650 mg  650 mg Oral Q4H PRN    albuterol (PROVENTIL VENTOLIN) nebulizer solution 2.5 mg  2.5 mg Nebulization Q4H RT    heparin (porcine) injection 5,000 Units  5,000 Units SubCUTAneous Q8H         Objective:      Physical Exam:  Visit Vitals  BP (!) 171/56 (BP 1 Location: Right arm, BP Patient Position: At rest)   Pulse (!) 59   Temp 98.6 °F (37 °C)   Resp 25   Ht 5' 5\" (1.651 m)   Wt 150 lb 5.7 oz (68.2 kg)   SpO2 100%   BMI 25.02 kg/m²     General Appearance:  Well developed, well nourished,alert , and individual in no acute distress. Ears/Nose/Mouth/Throat:   Hearing grossly normal.         Neck: Supple. Chest:   Lungs clear to auscultation bilaterally. Cardiovascular:  Regular rhythm, 2/6 systolic murmur. Abdomen:   Soft, non-tender    Extremities: No edema bilaterally. Skin: Warm and dry.                Data Review:   Labs:    Recent Results (from the past 24 hour(s))   POC EG7    Collection Time: 04/23/21 10:19 AM   Result Value Ref Range    Calcium, ionized (POC) 1.25 1.12 - 1.32 mmol/L    FIO2 (POC) 40 %    pH (POC) 7.41 7.35 - 7.45      pCO2 (POC) 43.3 35.0 - 45.0 MMHG    pO2 (POC) 90 80 - 100 MMHG    HCO3 (POC) 27.1 (H) 22 - 26 MMOL/L    Base excess (POC) 2 mmol/L    sO2 (POC) 97 92 - 97 %    Site RIGHT RADIAL      Device: BIPAP      PEEP/CPAP (POC) 6 cmH2O    PIP (POC) 12      Allens test (POC) NO      Specimen type (POC) ARTERIAL      Spontaneous timed YES     LACTIC ACID    Collection Time: 04/23/21 12:07 PM   Result Value Ref Range    Lactic acid 0.9 0.4 - 2.0 MMOL/L   TROPONIN I    Collection Time: 04/23/21  1:46 PM   Result Value Ref Range    Troponin-I, Qt. 0.06 (H) <1.43 ng/mL   METABOLIC PANEL, COMPREHENSIVE    Collection Time: 04/24/21  4:54 AM   Result Value Ref Range    Sodium 139 136 - 145 mmol/L    Potassium 3.2 (L) 3.5 - 5.1 mmol/L    Chloride 103 97 - 108 mmol/L    CO2 30 21 - 32 mmol/L    Anion gap 6 5 - 15 mmol/L    Glucose 102 (H) 65 - 100 mg/dL    BUN 23 (H) 6 - 20 MG/DL    Creatinine 1.25 (H) 0.55 - 1.02 MG/DL    BUN/Creatinine ratio 18 12 - 20      GFR est AA 50 (L) >60 ml/min/1.73m2    GFR est non-AA 41 (L) >60 ml/min/1.73m2    Calcium 9.3 8.5 - 10.1 MG/DL    Bilirubin, total 1.0 0.2 - 1.0 MG/DL    ALT (SGPT) 7 (L) 12 - 78 U/L    AST (SGOT) 18 15 - 37 U/L    Alk. phosphatase 88 45 - 117 U/L    Protein, total 6.0 (L) 6.4 - 8.2 g/dL    Albumin 3.1 (L) 3.5 - 5.0 g/dL    Globulin 2.9 2.0 - 4.0 g/dL    A-G Ratio 1.1 1.1 - 2.2     CBC WITH AUTOMATED DIFF    Collection Time: 04/24/21  4:54 AM   Result Value Ref Range    WBC 5.2 3.6 - 11.0 K/uL    RBC 2.61 (L) 3.80 - 5.20 M/uL    HGB 8.0 (L) 11.5 - 16.0 g/dL    HCT 25.6 (L) 35.0 - 47.0 %    MCV 98.1 80.0 - 99.0 FL    MCH 30.7 26.0 - 34.0 PG    MCHC 31.3 30.0 - 36.5 g/dL    RDW 15.2 (H) 11.5 - 14.5 %    PLATELET 273 929 - 301 K/uL    MPV 8.9 8.9 - 12.9 FL    NRBC 0.0 0  WBC    ABSOLUTE NRBC 0.00 0.00 - 0.01 K/uL    NEUTROPHILS 66 32 - 75 %    LYMPHOCYTES 16 12 - 49 %    MONOCYTES 12 5 - 13 %    EOSINOPHILS 5 0 - 7 %    BASOPHILS 1 0 - 1 %    IMMATURE GRANULOCYTES 0 0.0 - 0.5 %    ABS. NEUTROPHILS 3.4 1.8 - 8.0 K/UL    ABS. LYMPHOCYTES 0.8 0.8 - 3.5 K/UL    ABS. MONOCYTES 0.6 0.0 - 1.0 K/UL    ABS. EOSINOPHILS 0.2 0.0 - 0.4 K/UL    ABS. BASOPHILS 0.1 0.0 - 0.1 K/UL    ABS. IMM.  GRANS. 0.0 0.00 - 0.04 K/UL    DF AUTOMATED TROPONIN I    Collection Time: 04/24/21  4:54 AM   Result Value Ref Range    Troponin-I, Qt. 0.05 (H) <0.05 ng/mL   GLUCOSE, POC    Collection Time: 04/24/21  8:45 AM   Result Value Ref Range    Glucose (POC) 111 (H) 65 - 100 mg/dL    Performed by Anmol JACOBSON (CON)        Telemetry: Sinus bradycardia      Assessment:     Active Problems:    SOB (shortness of breath) (4/23/2021)    Acute on chronic systolic congestive heart failure  Previous left bundle branch block  CAD with history of PCI and CABG  Mitral valve regurgitation    Plan:   Acute on chronic systolic CHF: She is improving with diuretic, continue bumex 1 mg bid  Replace potassium/add aldactone  Consider biventricular pacer (LVEF may improve with recent PCI, no ICD yet with recent PCI) but LBBB is now IVCD and QRS is not as wide as before in march 2021. Sinus bradycardia so cannot increase coreg more    Change hydralazine to entresto 24/26 mg bid, creatinine now is 1.25 and potassium is 3.2    CAD/CABG continue with aspirin plavix and Lisseth Rivera M.D.  [facility]  Electrophysiology/Cardiology  [facility] [place]  [address] [place] [address]                              [phone]

## 2021-04-25 LAB
ALBUMIN SERPL-MCNC: 3.3 G/DL (ref 3.5–5)
ALBUMIN/GLOB SERPL: 1.1 {RATIO} (ref 1.1–2.2)
ALP SERPL-CCNC: 98 U/L (ref 45–117)
ALT SERPL-CCNC: 9 U/L (ref 12–78)
ANION GAP SERPL CALC-SCNC: 9 MMOL/L (ref 5–15)
AST SERPL-CCNC: 18 U/L (ref 15–37)
ATRIAL RATE: 53 BPM
BASOPHILS # BLD: 0.1 K/UL (ref 0–0.1)
BASOPHILS NFR BLD: 1 % (ref 0–1)
BILIRUB SERPL-MCNC: 1.1 MG/DL (ref 0.2–1)
BUN SERPL-MCNC: 27 MG/DL (ref 6–20)
BUN/CREAT SERPL: 19 (ref 12–20)
CALCIUM SERPL-MCNC: 9.3 MG/DL (ref 8.5–10.1)
CALCULATED P AXIS, ECG09: 36 DEGREES
CALCULATED R AXIS, ECG10: -49 DEGREES
CALCULATED T AXIS, ECG11: -40 DEGREES
CHLORIDE SERPL-SCNC: 103 MMOL/L (ref 97–108)
CO2 SERPL-SCNC: 23 MMOL/L (ref 21–32)
CREAT SERPL-MCNC: 1.39 MG/DL (ref 0.55–1.02)
DIAGNOSIS, 93000: NORMAL
DIFFERENTIAL METHOD BLD: ABNORMAL
EOSINOPHIL # BLD: 0.3 K/UL (ref 0–0.4)
EOSINOPHIL NFR BLD: 4 % (ref 0–7)
ERYTHROCYTE [DISTWIDTH] IN BLOOD BY AUTOMATED COUNT: 14.8 % (ref 11.5–14.5)
GLOBULIN SER CALC-MCNC: 3.1 G/DL (ref 2–4)
GLUCOSE SERPL-MCNC: 150 MG/DL (ref 65–100)
HCT VFR BLD AUTO: 29.8 % (ref 35–47)
HGB BLD-MCNC: 10 G/DL (ref 11.5–16)
IMM GRANULOCYTES # BLD AUTO: 0.1 K/UL (ref 0–0.04)
IMM GRANULOCYTES NFR BLD AUTO: 1 % (ref 0–0.5)
LYMPHOCYTES # BLD: 0.6 K/UL (ref 0.8–3.5)
LYMPHOCYTES NFR BLD: 9 % (ref 12–49)
MCH RBC QN AUTO: 31.1 PG (ref 26–34)
MCHC RBC AUTO-ENTMCNC: 33.6 G/DL (ref 30–36.5)
MCV RBC AUTO: 92.5 FL (ref 80–99)
MONOCYTES # BLD: 0.6 K/UL (ref 0–1)
MONOCYTES NFR BLD: 8 % (ref 5–13)
NEUTS SEG # BLD: 5.4 K/UL (ref 1.8–8)
NEUTS SEG NFR BLD: 77 % (ref 32–75)
NRBC # BLD: 0 K/UL (ref 0–0.01)
NRBC BLD-RTO: 0 PER 100 WBC
P-R INTERVAL, ECG05: 182 MS
PLATELET # BLD AUTO: 352 K/UL (ref 150–400)
PMV BLD AUTO: 8.8 FL (ref 8.9–12.9)
POTASSIUM SERPL-SCNC: 3.8 MMOL/L (ref 3.5–5.1)
PROT SERPL-MCNC: 6.4 G/DL (ref 6.4–8.2)
Q-T INTERVAL, ECG07: 564 MS
QRS DURATION, ECG06: 94 MS
QTC CALCULATION (BEZET), ECG08: 529 MS
RBC # BLD AUTO: 3.22 M/UL (ref 3.8–5.2)
RBC MORPH BLD: ABNORMAL
SODIUM SERPL-SCNC: 135 MMOL/L (ref 136–145)
VENTRICULAR RATE, ECG03: 53 BPM
WBC # BLD AUTO: 7.1 K/UL (ref 3.6–11)

## 2021-04-25 PROCEDURE — 74011250637 HC RX REV CODE- 250/637: Performed by: FAMILY MEDICINE

## 2021-04-25 PROCEDURE — 97165 OT EVAL LOW COMPLEX 30 MIN: CPT

## 2021-04-25 PROCEDURE — 74011250636 HC RX REV CODE- 250/636: Performed by: FAMILY MEDICINE

## 2021-04-25 PROCEDURE — 99233 SBSQ HOSP IP/OBS HIGH 50: CPT | Performed by: INTERNAL MEDICINE

## 2021-04-25 PROCEDURE — 74011250637 HC RX REV CODE- 250/637: Performed by: INTERNAL MEDICINE

## 2021-04-25 PROCEDURE — 94660 CPAP INITIATION&MGMT: CPT

## 2021-04-25 PROCEDURE — 93005 ELECTROCARDIOGRAM TRACING: CPT

## 2021-04-25 PROCEDURE — 97535 SELF CARE MNGMENT TRAINING: CPT

## 2021-04-25 PROCEDURE — 85025 COMPLETE CBC W/AUTO DIFF WBC: CPT

## 2021-04-25 PROCEDURE — 94760 N-INVAS EAR/PLS OXIMETRY 1: CPT

## 2021-04-25 PROCEDURE — 77010033678 HC OXYGEN DAILY

## 2021-04-25 PROCEDURE — 74011000250 HC RX REV CODE- 250: Performed by: FAMILY MEDICINE

## 2021-04-25 PROCEDURE — 36415 COLL VENOUS BLD VENIPUNCTURE: CPT

## 2021-04-25 PROCEDURE — 80053 COMPREHEN METABOLIC PANEL: CPT

## 2021-04-25 PROCEDURE — 74011250636 HC RX REV CODE- 250/636: Performed by: INTERNAL MEDICINE

## 2021-04-25 PROCEDURE — 65660000001 HC RM ICU INTERMED STEPDOWN

## 2021-04-25 RX ORDER — LORAZEPAM 2 MG/ML
0.5 INJECTION INTRAMUSCULAR
Status: DISCONTINUED | OUTPATIENT
Start: 2021-04-25 | End: 2021-04-27

## 2021-04-25 RX ADMIN — LORAZEPAM 0.5 MG: 2 INJECTION INTRAMUSCULAR; INTRAVENOUS at 18:10

## 2021-04-25 RX ADMIN — HEPARIN SODIUM 5000 UNITS: 5000 INJECTION INTRAVENOUS; SUBCUTANEOUS at 05:57

## 2021-04-25 RX ADMIN — LORAZEPAM 0.5 MG: 2 INJECTION INTRAMUSCULAR; INTRAVENOUS at 23:59

## 2021-04-25 RX ADMIN — SPIRONOLACTONE 25 MG: 25 TABLET ORAL at 08:40

## 2021-04-25 RX ADMIN — BUMETANIDE 1 MG: 0.25 INJECTION INTRAMUSCULAR; INTRAVENOUS at 18:10

## 2021-04-25 RX ADMIN — DULOXETINE HYDROCHLORIDE 120 MG: 60 CAPSULE, DELAYED RELEASE ORAL at 08:39

## 2021-04-25 RX ADMIN — Medication 10 ML: at 06:00

## 2021-04-25 RX ADMIN — CLOPIDOGREL BISULFATE 75 MG: 75 TABLET ORAL at 08:40

## 2021-04-25 RX ADMIN — HEPARIN SODIUM 5000 UNITS: 5000 INJECTION INTRAVENOUS; SUBCUTANEOUS at 19:54

## 2021-04-25 RX ADMIN — PANTOPRAZOLE SODIUM 40 MG: 40 TABLET, DELAYED RELEASE ORAL at 05:59

## 2021-04-25 RX ADMIN — CARBIDOPA AND LEVODOPA 2 TABLET: 25; 100 TABLET ORAL at 21:27

## 2021-04-25 RX ADMIN — BUMETANIDE 1 MG: 0.25 INJECTION INTRAMUSCULAR; INTRAVENOUS at 08:37

## 2021-04-25 RX ADMIN — CARBIDOPA AND LEVODOPA 2 TABLET: 25; 100 TABLET ORAL at 12:17

## 2021-04-25 RX ADMIN — CARVEDILOL 3.12 MG: 3.12 TABLET, FILM COATED ORAL at 08:39

## 2021-04-25 RX ADMIN — SACUBITRIL AND VALSARTAN 1 TABLET: 24; 26 TABLET, FILM COATED ORAL at 21:27

## 2021-04-25 RX ADMIN — CARBIDOPA AND LEVODOPA 2 TABLET: 25; 100 TABLET ORAL at 18:10

## 2021-04-25 RX ADMIN — LORAZEPAM 0.5 MG: 2 INJECTION INTRAMUSCULAR; INTRAVENOUS at 12:15

## 2021-04-25 RX ADMIN — ASPIRIN 81 MG: 81 TABLET, COATED ORAL at 21:27

## 2021-04-25 RX ADMIN — ATORVASTATIN CALCIUM 40 MG: 40 TABLET, FILM COATED ORAL at 21:27

## 2021-04-25 RX ADMIN — SACUBITRIL AND VALSARTAN 1 TABLET: 24; 26 TABLET, FILM COATED ORAL at 08:39

## 2021-04-25 RX ADMIN — Medication 10 ML: at 21:27

## 2021-04-25 RX ADMIN — HEPARIN SODIUM 5000 UNITS: 5000 INJECTION INTRAVENOUS; SUBCUTANEOUS at 12:17

## 2021-04-25 RX ADMIN — POTASSIUM CHLORIDE 40 MEQ: 750 TABLET, FILM COATED, EXTENDED RELEASE ORAL at 08:39

## 2021-04-25 RX ADMIN — Medication 10 ML: at 12:16

## 2021-04-25 RX ADMIN — CARBIDOPA AND LEVODOPA 2 TABLET: 25; 100 TABLET ORAL at 08:39

## 2021-04-25 RX ADMIN — CARVEDILOL 3.12 MG: 3.12 TABLET, FILM COATED ORAL at 18:10

## 2021-04-25 NOTE — PROGRESS NOTES
Progress Note    Patient: Sonya De La Garza MRN: [id number]  SSN: [id number]    YOB: 1940  Age: 79 y.o. Sex: female      Admit Date: 4/23/2021    LOS: 2 days      Assessment/plan:  1. Acute on chronic systolic heart failure. Patient is diuresing well, and has had urine output of 5.5L documented since admission. Patient is on diuretics with Bumex 1 mg IV twice daily,  spironolactone 25 mg p.o. daily, nonselective beta-blocker with Coreg 3.125 mg p.o. twice daily. [address] cardiology consultation. ,  She was started on Entresto, April 24, by cardiologist, Dr. Juan Gagnon. Continue diuresis today as per cardiology  2. Coronary artery disease. S/p CABG. Continue Plavix, aspirin, Coreg, atorvastatin,  3. Chronic left bundle branch block. Per cardiologist, this is contributing to low LVEF. 4. Parkinson's disease. Continue carbidopa-levodopa. 5.  Hypokalemia. Patient has been started on potassium supplementation with 40 mEq daily X2 doses by Dr Kelsey Farris. 6.  Chronic kidney disease stage III. Now close to baseline. 7. Anxiety with overwhelming Sx: Currently on Cymbalta. . Ativan added b cards. Will pace palliative consults  8. Debility: PT/OT/Case management consults. Patient states she struggles to live alone. She became very tearful and states she has no family (never , no kids), and makes just too much money to qualify for medicaid. She is worried about living alone, especially since she has quite a bit of difficulty walking.  She extensively talked about how wonderful a time she had at rehab in the past          Current Facility-Administered Medications:     LORazepam (ATIVAN) injection 0.5 mg, 0.5 mg, IntraVENous, Q6H PRN, Jefferson Lewis MD, 0.5 mg at 04/25/21 1215    sacubitriL-valsartan (ENTRESTO) 24-26 mg tablet 1 Tab, 1 Tab, Oral, Q12H, Jefferson Lewis MD, 1 Tab at 04/25/21 1665    spironolactone (ALDACTONE) tablet 25 mg, 25 mg, Oral, DAILY, Jefferson Lewis MD, 25 mg at 04/25/21 0840    albuterol (PROVENTIL VENTOLIN) nebulizer solution 2.5 mg, 2.5 mg, Nebulization, Q4H PRN, Darian Delaney MD    aspirin delayed-release tablet 81 mg, 81 mg, Oral, QHS, Joesph Dockery MD, 81 mg at 04/24/21 2107    atorvastatin (LIPITOR) tablet 40 mg, 40 mg, Oral, QHS, Joesph Dockery MD, 40 mg at 04/24/21 2107    carbidopa-levodopa (SINEMET)  mg per tablet 2 Tab, 2 Tab, Oral, QID, Pedro Hahn MD, 2 Tab at 04/25/21 1217    carvediloL (COREG) tablet 3.125 mg, 3.125 mg, Oral, BID WITH MEALS, Pedro Hhan MD, 3.125 mg at 04/25/21 0839    clopidogreL (PLAVIX) tablet 75 mg, 75 mg, Oral, DAILY AFTER BREAKFAST, Pedro Hahn MD, 75 mg at 04/25/21 0840    DULoxetine (CYMBALTA) capsule 120 mg, 120 mg, Oral, DAILY, Pedro Hahn MD, 120 mg at 04/25/21 0839    pantoprazole (PROTONIX) tablet 40 mg, 40 mg, Oral, ACB, Pedro Hahn MD, 40 mg at 04/25/21 0559    sodium chloride (NS) flush 5-40 mL, 5-40 mL, IntraVENous, Q8H, Joesph Dockery MD, 10 mL at 04/25/21 1216    sodium chloride (NS) flush 5-40 mL, 5-40 mL, IntraVENous, PRN, Pedro Hahn MD    bumetanide (BUMEX) injection 1 mg, 1 mg, IntraVENous, BID, Pedro Hahn MD, 1 mg at 04/25/21 0837    nitroglycerin (NITROSTAT) tablet 0.4 mg, 0.4 mg, SubLINGual, Q5MIN PRN, Pedro Hahn MD    acetaminophen (TYLENOL) tablet 650 mg, 650 mg, Oral, Q4H PRN, Pedro Hahn MD    heparin (porcine) injection 5,000 Units, 5,000 Units, SubCUTAneous, Q8H, Pedro Hahn MD, 5,000 Units at 04/25/21 1217    Interval summary: Patient was admitted with hypoxia, acute on chronic systolic heart failure on 4/23/2021, and placed on BiPAP at time of admission on 4/23/2021. Late last night, she was taken off BiPAP, then placed on nonrebreather for a few minutes before being placed on 3 L/min oxygen via nasal cannula at 7 PM on 4/23/2021.   This morning, the flow rate was turned down to 1.5 L/min around 8 AM.    Subjective:     She states that she is feeling much better, and does not feel particularly short of breath at this time. At time of examination, she is on 1.5 L/min oxygen via nasal cannula. Patient states that she usually does not use supplemental oxygen at the skilled nursing facility at baseline. Patient states that she does not walk very much, because \" there is swelling in her feet\". Patient denies numbness in her feet. Patient states that her ankles swell on and off but she does not always have shortness of breath associated with the ankle swelling. Patient denies chest pain, palpitations, lightheadedness or dizziness. Patient denies coughing, congestion. Objective:     Patient Vitals for the past 24 hrs:   BP Temp Pulse Resp SpO2  oxygen therapy   04/24/21 1656 (!) 143/73 98.5 °F (36.9 °C) 61 18 99 %     04/24/21 1600   (!) 59      04/24/21 1200   60      04/24/21 1118 (!) 128/50 97.9 °F (36.6 °C) 62 19 98 %     04/24/21 0817 (!) 171/56 98.6 °F (37 °C) (!) 59 25 100 %  1.5 liters/minute   04/24/21 0800   69      04/24/21 0758     99 %   3 liters per minute   04/24/21 0439     99 %  3 liters per minute   04/24/21 0400 (!) 154/57 97.7 °F (36.5 °C) (!) 52 17 99 %  3 liters per minute   04/23/21 2304 (!) 143/53 97.6 °F (36.4 °C) (!) 52 15 100 %  3 liters per minute   04/23/21 2300     100 %  3 liters per minute   04/23/21 1930         04/23/21 1916     100 %  3 liters per minute   04/23/21 1900 (!) 155/58 98.7 °F (37.1 °C) (!) 59 19 99 %  3 liters per minute   04/23/21 1855 (!) 166/70  (!) 57 19 100 %  nonrebreather     Intake and Output:  Current Shift: No intake/output data recorded. Last three shifts: 04/23 1901 - 04/25 0700  In: 480 [P.O.:480]  Out: 4325 [Urine:4325]    Physical Exam:   Estimated body mass index is 25.61 kg/m² as calculated from the following:    Height as of this encounter: 5' 5\" (1.651 m). Weight as of this encounter: 69.8 kg (153 lb 14.1 oz). General: In no acute distress. Well developed, well nourished. Head: Normocephalic, atraumatic. Eyes: Anicteric sclera. PERRL. Extraocular muscles intact. ENT: External ears and nose appear normal.  Oral mucosa moist.  Neck: Supple. No jugular venous distention. Heart: Regular rate and rhythm. Grade 2/6 systolic ejection murmur. Chest: Symmetrical excursion. Clear to auscultation bilaterally. Abdomen: Soft, nontender. No abnormal distention. Bowel sounds are present throughout. Extremities: No gross deformities. No edema, no cyanosis. Feet are warm to touch. Neurological: Patient is moving all extremities with at least antigravity strength. Alert, oriented X3. Skin: No jaundice. No rashes. Lab/Data Review:  Recent Results (from the past 24 hour(s))   METABOLIC PANEL, COMPREHENSIVE    Collection Time: 04/24/21  4:54 AM   Result Value Ref Range    Sodium 139 136 - 145 mmol/L    Potassium 3.2 (L) 3.5 - 5.1 mmol/L    Chloride 103 97 - 108 mmol/L    CO2 30 21 - 32 mmol/L    Anion gap 6 5 - 15 mmol/L    Glucose 102 (H) 65 - 100 mg/dL    BUN 23 (H) 6 - 20 MG/DL    Creatinine 1.25 (H) 0.55 - 1.02 MG/DL    BUN/Creatinine ratio 18 12 - 20      GFR est AA 50 (L) >60 ml/min/1.73m2    GFR est non-AA 41 (L) >60 ml/min/1.73m2    Calcium 9.3 8.5 - 10.1 MG/DL    Bilirubin, total 1.0 0.2 - 1.0 MG/DL    ALT (SGPT) 7 (L) 12 - 78 U/L    AST (SGOT) 18 15 - 37 U/L    Alk.  phosphatase 88 45 - 117 U/L    Protein, total 6.0 (L) 6.4 - 8.2 g/dL    Albumin 3.1 (L) 3.5 - 5.0 g/dL    Globulin 2.9 2.0 - 4.0 g/dL    A-G Ratio 1.1 1.1 - 2.2     CBC WITH AUTOMATED DIFF    Collection Time: 04/24/21  4:54 AM   Result Value Ref Range    WBC 5.2 3.6 - 11.0 K/uL    RBC 2.61 (L) 3.80 - 5.20 M/uL    HGB 8.0 (L) 11.5 - 16.0 g/dL    HCT 25.6 (L) 35.0 - 47.0 %    MCV 98.1 80.0 - 99.0 FL    MCH 30.7 26.0 - 34.0 PG    MCHC 31.3 30.0 - 36.5 g/dL    RDW 15.2 (H) 11.5 - 14.5 %    PLATELET 600 594 - 950 K/uL    MPV 8.9 8.9 - 12.9 FL    NRBC 0.0 0  WBC    ABSOLUTE NRBC 0.00 0.00 - 0.01 K/uL    NEUTROPHILS 66 32 - 75 %    LYMPHOCYTES 16 12 - 49 %    MONOCYTES 12 5 - 13 %    EOSINOPHILS 5 0 - 7 %    BASOPHILS 1 0 - 1 %    IMMATURE GRANULOCYTES 0 0.0 - 0.5 %    ABS. NEUTROPHILS 3.4 1.8 - 8.0 K/UL    ABS. LYMPHOCYTES 0.8 0.8 - 3.5 K/UL    ABS. MONOCYTES 0.6 0.0 - 1.0 K/UL    ABS. EOSINOPHILS 0.2 0.0 - 0.4 K/UL    ABS. BASOPHILS 0.1 0.0 - 0.1 K/UL    ABS. IMM.  GRANS. 0.0 0.00 - 0.04 K/UL    DF AUTOMATED     TROPONIN I    Collection Time: 04/24/21  4:54 AM   Result Value Ref Range    Troponin-I, Qt. 0.05 (H) <0.05 ng/mL   GLUCOSE, POC    Collection Time: 04/24/21  8:45 AM   Result Value Ref Range    Glucose (POC) 111 (H) 65 - 100 mg/dL    Performed by Martie Spatz K (CON)    GLUCOSE, POC    Collection Time: 04/24/21 11:16 AM   Result Value Ref Range    Glucose (POC) 176 (H) 65 - 100 mg/dL    Performed by Martie Spatz K (CON)    GLUCOSE, POC    Collection Time: 04/24/21  4:54 PM   Result Value Ref Range    Glucose (POC) 120 (H) 65 - 100 mg/dL    Performed by Melia Watts        Signed By: Lindsay Valdivia MD     April 25, 2021

## 2021-04-25 NOTE — PROGRESS NOTES
Problem: Self Care Deficits Care Plan (Adult)  Goal: *Acute Goals and Plan of Care (Insert Text)  Description:   FUNCTIONAL STATUS PRIOR TO ADMISSION: Pt resides alone in single story apartment, reporting Mod Bridger with self-care at W/C level, briefly standing with use of RW; however, progressive difficulty with stand tolerance 2/2 hypersensitivity to BLE. Pt reports she has 6inch lipped step in shower with shower chair. Pt reports she is nervous about being able to care for herself at home, with little to no local support; however, pt reports desires to return home with [facility] therapy. HOME SUPPORT: The patient lived alone with no local support. Occupational Therapy Goals  Initiated 4/25/2021  1. Patient will perform W/C grooming with modified independence within 7 day(s). 2.  Patient will perform EOB bathing with set-up and AE PRN within 7 day(s). 3.  Patient will perform EOB/RW lower body dressing with set-up and AE PRN within 7 day(s). 4.  Patient will perform toilet transfers, using RW, with modified independence within 7 day(s). 5.  Patient will perform all aspects of toileting, using RW, with modified independence within 7 day(s). 6.  Patient will complete EOB to W/C standing pivot turn transfer, using RW, with modified independent within 7 day(s). Outcome: Not Met    OCCUPATIONAL THERAPY EVALUATION  Patient: Martir Quezada (51 y.o. female)  Date: 4/25/2021  Primary Diagnosis: SOB (shortness of breath) [R06.02]        Precautions: Fall    ASSESSMENT  Based on the objective data described below, the patient presents with decreased functional mobility/balance, decreased stand tolerance, reported hypersensitivity to plantar aspects of bilateral feet, decreased distal LE ADL reaching and decreased activity tolerance, all of which limit pt's ability to complete self-care routine at level congruent with PLOF.   Currently, pt benefits from Bridger for self-feeding, S/U for EOB grooming and UB dressing and Min A for LE bathing/dressing and toileting. Pt reports she is Mod Independent at W/C level at home and using RW for brief stands during LE bathing/dressing and for transfers to/from W/C. Pt reports concerns with caring for self at home 2/2 hypersensitivity and poor stand tolerance; however, was adamant she did not want rehab and desires to return home with [facility] therapy. Pt benefits from skilled OT to address functional deficits during acute hospitalization, with reporting therapist believing pt would benefit from [address] [place] [address] services upon discharge. Current Level of Function Impacting Discharge (ADLs/self-care): [place] for self-feeding, S/U for EOB grooming and UB dressing and Min A for LE bathing/dressing and toileting    Functional Outcome Measure: The patient scored 45/100 on the Barthel Index outcome measure. Other factors to consider for discharge: Lives alone, anxious     Patient will benefit from skilled therapy intervention to address the above noted impairments. PLAN :  Recommendations and Planned Interventions: self care training, functional mobility training, therapeutic exercise, balance training, therapeutic activities, endurance activities, and patient education    Frequency/Duration: Patient will be followed by occupational therapy 5 times a week to address goals. Recommendation for discharge: (in order for the patient to meet his/her long term goals)  Occupational therapy at least 2 days/week in the home     This discharge recommendation:  Has not yet been discussed the attending provider and/or case management    IF patient discharges home will need the following DME: patient owns DME required for discharge       SUBJECTIVE:   Patient stated I do not want to go to rehab, I'm going to home this time.     OBJECTIVE DATA SUMMARY:   HISTORY:   Past Medical History:   Diagnosis Date    Anxiety disorder     Atrial fibrillation (HCC)     CAD (coronary artery disease) 2007    stents, CABG x 3v    Carotid stenosis     Cervical stenosis of spinal canal 07/2019    Chronic kidney disease     Cough     CVA (cerebral vascular accident) ([facility] 07/2019    left lacunar infarct at head of caudate    Depression     AND CHRONIC ANXIETY    Diabetes ([facility]     GERD (gastroesophageal reflux disease)     High cholesterol     History of peptic ulcer     Bleeding ulcer with increased NSAID use    Hypertension     Left carotid stenosis 07/2019    s/p left CEA with Dr. Oziel Rg    MI, old 2007    PUD (peptic ulcer disease)     Stroke ([facility]     Tremor     Valvular heart disease      Past Surgical History:   Procedure Laterality Date    HX CAROTID ENDARTERECTOMY  07/2019    HX CORONARY ARTERY BYPASS GRAFT      HX TONSILLECTOMY  1963    ID CARDIAC SURG PROCEDURE UNLIST      CABG X3 VESSEL    ID TOTAL KNEE ARTHROPLASTY Right 2015       Expanded or extensive additional review of patient history:     Home Situation  Home Environment: Apartment([address])  # Steps to Enter: 0  One/Two Story Residence: One story  Living Alone: Yes  Support Systems: None  Patient Expects to be Discharged to[de-identified] Apartment  Current DME Used/Available at Home: Walker, rolling, Wheelchair, Cane, straight, Shower chair  Tub or Shower Type: Shower(6 inch lip)    Hand dominance: Right    EXAMINATION OF PERFORMANCE DEFICITS:  Cognitive/Behavioral Status:  Neurologic State: Alert  Orientation Level: Oriented X4  Cognition: Follows commands  Perception: Appears intact  Perseveration: No perseveration noted  Safety/Judgement: Awareness of environment    Hearing: Auditory  Auditory Impairment: Hard of hearing, bilateral    Vision/Perceptual:    Corrective Lenses: Reading glasses    Range of Motion:  AROM: Generally decreased, functional    Strength:  Strength: Generally decreased, functional    Coordination:  Coordination: Within functional limits  Fine Motor Skills-Upper: Left Intact; Right Intact    Gross Motor Skills-Upper: Left Impaired;Right Impaired(unable to raise hands above head; able to wash hair)    Tone & Sensation:  Tone: Normal  Sensation: Impaired(hypersensitivity in B feet)    Balance:  Sitting: Intact; With support  Standing: Impaired; With support  Standing - Static: Good;Constant support  Standing - Dynamic : Fair;Constant support    Functional Mobility and Transfers for ADLs:  Bed Mobility:  Rolling: Supervision  Supine to Sit: Stand-by assistance;Bed Modified(head of bed elevated, used of bedrails)  Sit to Supine: Stand-by assistance;Bed Modified(head of bed elevated, use of bedrails)    Transfers:  Sit to Stand: Contact guard assistance  Stand to Sit: Contact guard assistance    ADL Assessment:  Feeding: Independent    Oral Facial Hygiene/Grooming: Setup(seated EOB)    Bathing: Minimum assistance    Upper Body Dressing: Setup(seated EOB)    Lower Body Dressing: Minimum assistance    Toileting: Minimum assistance    ADL Intervention and task modifications:  Patient instructed and indicated understanding the benefits of maintaining activity tolerance, functional mobility, and independence with self care tasks during acute stay  to ensure safe return home and to baseline. Encouraged patient to increase frequency and duration OOB, be out of bed for all meals, perform daily ADLs (as approved by RN/MD regarding bathing etc), and performing functional mobility to/from [facility]. Pt educated on safe transfer techniques, with specific emphasis on proper hand placement to push up from seated surface rather than attempt to pull self up, fully positioning self in-front of desired seated location, feeling chair on back of legs and reaching back with 1-2 UE to slowly lower self to seated position.     Cognitive Retraining  Safety/Judgement: Awareness of environment    Functional Measure:  Barthel Index:    Bathin  Bladder: 5  Bowels: 5  Groomin  Dressin  Feeding: 10  Mobility: 0  Stairs: 0  Toilet Use: 5  Transfer (Bed to Chair and Back): 10  Total: 45/100        The Barthel ADL Index: Guidelines  1. The index should be used as a record of what a patient does, not as a record of what a patient could do. 2. The main aim is to establish degree of independence from any help, physical or verbal, however minor and for whatever reason. 3. The need for supervision renders the patient not independent. 4. A patient's performance should be established using the best available evidence. Asking the patient, friends/relatives and nurses are the usual sources, but direct observation and common sense are also important. However direct testing is not needed. 5. Usually the patient's performance over the preceding 24-48 hours is important, but occasionally longer periods will be relevant. 6. Middle categories imply that the patient supplies over 50 per cent of the effort. 7. Use of aids to be independent is allowed. Booker Manzano., Barthel, DLAXMI. (4194). Functional evaluation: the Barthel Index. [facility] (14)2. FANTA Gunter, Josseline Lopez., Merline Groveton., [place], [address] [place] [address] (1999). Measuring the change indisability after inpatient rehabilitation; comparison of the responsiveness of the Barthel Index and Functional [place] Measure. Journal of Neurology, Neurosurgery, and Psychiatry, 66(4), 647-227. Dayan Almanzar, GARYJ.ELAINE, APARNA Ventura, & Tova Velazquez M.A. (2004.) Assessment of post-stroke quality of life in cost-effectiveness studies: The usefulness of the Barthel Index and the EuroQoL-5D.  Quality of Life Research, 15, 824-92     Occupational Therapy Evaluation Charge Determination   History Examination Decision-Making   LOW Complexity : Brief history review  MEDIUM Complexity : 3-5 performance deficits relating to physical, cognitive , or psychosocial skils that result in activity limitations and / or participation restrictions LOW Complexity : No comorbidities that affect functional and no verbal or physical assistance needed to complete eval tasks       Based on the above components, the patient evaluation is determined to be of the following complexity level: LOW   Pain Rating:  Pt reported \"sensitivity\" to bottom of both feet which limited stand tolerance, did not quantify pain level; nursing aware and following    Activity Tolerance:   Fair and requires rest breaks    After treatment patient left in no apparent distress:    Supine in bed, Call bell within reach, and Side rails x 3    COMMUNICATION/EDUCATION:   The patients plan of care was discussed with: Registered nurse. Home safety education was provided and the patient/caregiver indicated understanding., Patient/family have participated as able in goal setting and plan of care. , and Patient/family agree to work toward stated goals and plan of care. This patients plan of care is appropriate for delegation to BEL.     Thank you for this referrals  Emily Villela OT  Time Calculation: 20 mins

## 2021-04-25 NOTE — PROGRESS NOTES
Cardiology Progress Note         4/25/2021 9:12 AM    Admit Date: 4/23/2021    Admit Diagnosis: SOB (shortness of breath) [R06.02]      Subjective:     Maria R Aquino is anxious this morning  Cannot sleep and said she wants anxiolytic now  She denies chest pain shortness of breath orthopnea  Dr Jaylon Sharma and Elif Vergara had seen her    03/29/21   ECHO ADULT FOLLOW-UP OR LIMITED 04/03/2021 4/3/2021    Narrative · Contrast used: DEFINITY. · LV: Estimated LVEF is 30 - 35%. Visually measured ejection fraction. Mildly dilated left ventricle. Increased wall thickness. Moderately   reduced systolic function. Abnormal left ventricular septal motion. Left   ventricular diastolic dysfunction. · LA: Moderately dilated left atrium. · RV: Mildly dilated right ventricle. Mildly reduced systolic function. · RA: Mildly dilated right atrium. · Limited echocardiogram without assessment of valves. · Left ventricular systolic function improved compared to prior echo of   3/31/2021. Signed by: Merlyn Rose MD     ECG NSR IVCD, previously LBBB    She is a patient of Dr. Jaylon Sharma and Dr. Boston Chacon who has history of coronary artery disease, is status post coronary artery bypass grafting and has had recurrent hospitalizations for congestive heart failure. She is currently admitted to the hospital with progressive shortness of breath of 2 days duration. Right before being brought to the emergency room, she was extremely hypoxemic with significant orthopnea and PND. No chest pain was reported by the patient. Therefore Dr. Elif Vergara did not think that she have in-stent restenosis or thrombosis  Notably she had history of hypertensive heart disease as well with previously uncontrolled blood pressures. She has moderate renal disease. EF has been ranging between 30 to 50% based on whether is performed when she is in the left bundle branch block versus narrow conduction.   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 Moreover a few weeks ago she had presented to the hospital with chest discomfort during which she had PCI performed to her native LAD through LIMA graft. She has residual small vessel disease which was deferred. She was discharged after optimizing her blood pressure and heart failure therapies. Plan was to consider cardiac resynchronization therapy downstream assuming her EF and heart failure symptoms do not improve. She also underwent a renal angiogram because of persistent hypertension which demonstrated moderate renal arterial disease but not severe. Problem List  Date Reviewed: 5/1/2020          Codes Class Noted    SOB (shortness of breath) ICD-10-CM: R06.02  ICD-9-CM: 786.05  4/23/2021        UTI (urinary tract infection) ICD-10-CM: N39.0  ICD-9-CM: 599.0  4/9/2021        CHF exacerbation (HCC) ICD-10-CM: I50.9  ICD-9-CM: 428.0  9/25/2020        CHF (congestive heart failure) (HCC) ICD-10-CM: I50.9  ICD-9-CM: 428.0  9/22/2020        Weakness ICD-10-CM: R53.1  ICD-9-CM: 780.79  5/4/2020        Generalized weakness ICD-10-CM: R53.1  ICD-9-CM: 780.79  4/30/2020        Carotid artery stenosis, symptomatic, left ICD-10-CM: W71.16  ICD-9-CM: 433.10  7/26/2019        HTN (hypertension) ICD-10-CM: I10  ICD-9-CM: 401.9  7/17/2019        Acute ischemic stroke [facility]) ICD-10-CM: I63.9  ICD-9-CM: 434.91  7/12/2019        Fall ICD-10-CM: Z19. Sandeep Curiel  ICD-9-CM: E888.9  12/24/2018        CKD (chronic kidney disease), stage III ([facility] ICD-10-CM: N18.30  ICD-9-CM: 585.3  7/8/2015        Edema ICD-10-CM: R60.9  ICD-9-CM: 782.3  5/6/2015        Diabetes mellitus ([facility] ICD-10-CM: E11.9  ICD-9-CM: 250.00  5/6/2015        Postoperative anemia due to acute blood loss ICD-10-CM: D62  ICD-9-CM: 285.1  2/14/2015        S/P CABG (coronary artery bypass graft) ICD-10-CM: Z95.1  ICD-9-CM: V45.81  2/13/2015        Syncope ICD-10-CM: R55  ICD-9-CM: 780.2  2/9/2015        Bradycardia ICD-10-CM: R00.1  ICD-9-CM: 427.89 2/9/2015        Acute kidney injury ([facility] ICD-10-CM: N17.9  ICD-9-CM: 584.9  2/9/2015        Anemia ICD-10-CM: D64.9  ICD-9-CM: 285.9  9/9/2014        GI bleed ICD-10-CM: K92.2  ICD-9-CM: 578.9  9/9/2014        AF (atrial fibrillation) (HCC) ICD-10-CM: I48.91  ICD-9-CM: 427.31  6/20/2014        Hypotension ICD-10-CM: I95.9  ICD-9-CM: 458.9  6/3/2014        Coronary artery disease ICD-10-CM: I25.10  ICD-9-CM: 414.00  6/3/2014    Overview Signed 2/10/2015 11:06 AM by Charlie Sicard     2007 PCI x2 to LAD with STEPHAN             S/P coronary artery stent placement ICD-10-CM: Z95.5  ICD-9-CM: V45.82  6/3/2014        Renal failure ICD-10-CM: N19  ICD-9-CM: 904  6/3/2014        Elevated troponin ICD-10-CM: R77.8  ICD-9-CM: 790.6  6/3/2014        Pericardial effusion ICD-10-CM: I31.3  ICD-9-CM: 423.9  6/3/2014        Lactic acidosis ICD-10-CM: E87.2  ICD-9-CM: 276.2  6/3/2014            Past Medical History:   Diagnosis Date    Anxiety disorder     At[facility] fibrillation ([facility]     CAD (coronary artery disease) 2007    stents, CABG x 3v    Carotid stenosis     Cervical stenosis of spinal canal 07/2019    Chronic kidney disease     Cough     CVA (cerebral vascular accident) ([facility] 07/2019    left lacunar infarct at head of caudate    Depression     AND CHRONIC ANXIETY    Diabetes ([facility]     GERD (gastroesophageal reflux disease)     High cholesterol     History of peptic ulcer     Bleeding ulcer with increased NSAID use    Hypertension     Left carotid stenosis 07/2019    s/p left CEA with Dr. Sheryle Sandy, old 2007    PUD (peptic ulcer disease)     Stroke ([facility]     Tremor     Valvular heart disease      Past Surgical History:   Procedure Laterality Date    HX CAROTID ENDARTERECTOMY  07/2019    HX CORONARY ARTERY BYPASS GRAFT      HX TONSILLECTOMY  1963    [facility] CARDIAC SURG PROCEDURE UNLIST      CABG X3 VESSEL    VA TOTAL KNEE ARTHROPLASTY Right 2015     Social History     Socioeconomic History    Marital status: SINGLE     Spouse name: Not on file    Number of children: Not on file    Years of education: Not on file    Highest education level: Not on file   Occupational History    Occupation: Retired realestate/teacher   Social Needs    Financial resource strain: Not on file    Food insecurity     Worry: Not on file     Inability: Not on file   [employer] needs     Medical: Not on file     Non-medical: Not on file   Tobacco Use    Smoking status: Former Smoker     Packs/day: 0.25     Years: 5.00     Pack years: 1.25     Types: Cigarettes     Quit date:      Years since quittin.3    Smokeless tobacco: Never Used   Substance and Sexual Activity    Alcohol use:  Yes     Alcohol/week: 0.0 standard drinks     Comment: Rare    Drug use: No    Sexual activity: Not on file   Lifestyle    Physical activity     Days per week: Not on file     Minutes per session: Not on file    Stress: Not on file   Relationships    Social connections     Talks on phone: Not on file     Gets together: Not on file     Attends Congregational service: Not on file     Active member of club or organization: Not on file     Attends meetings of clubs or organizations: Not on file     Relationship status: Not on file    Intimate partner violence     Fear of current or ex partner: Not on file     Emotionally abused: Not on file     Physically abused: Not on file     Forced sexual activity: Not on file   Other Topics Concern    Not on file   Social History Narrative    [facility]     Family History   Problem Relation Age of Onset    Heart Attack Mother 72        Dec 79yo    Hypertension Mother     Other Father         Unknown    Parkinson's Disease Brother     Anesth Problems Neg Hx          Current Facility-Administered Medications   Medication Dose Route Frequency    LORazepam (ATIVAN) injection 0.5 mg  0.5 mg IntraVENous Q6H PRN    sacubitriL-valsartan (ENTRESTO) 24-26 mg tablet 1 Tab  1 Tab Oral Q12H    spironolactone (ALDACTONE) tablet 25 mg  25 mg Oral DAILY    albuterol (PROVENTIL VENTOLIN) nebulizer solution 2.5 mg  2.5 mg Nebulization Q4H PRN    aspirin delayed-release tablet 81 mg  81 mg Oral QHS    atorvastatin (LIPITOR) tablet 40 mg  40 mg Oral QHS    carbidopa-levodopa (SINEMET)  mg per tablet 2 Tab  2 Tab Oral QID    carvediloL (COREG) tablet 3.125 mg  3.125 mg Oral BID WITH MEALS    clopidogreL (PLAVIX) tablet 75 mg  75 mg Oral DAILY AFTER BREAKFAST    DULoxetine (CYMBALTA) capsule 120 mg  120 mg Oral DAILY    pantoprazole (PROTONIX) tablet 40 mg  40 mg Oral ACB    sodium chloride (NS) flush 5-40 mL  5-40 mL IntraVENous Q8H    sodium chloride (NS) flush 5-40 mL  5-40 mL IntraVENous PRN    bumetanide (BUMEX) injection 1 mg  1 mg IntraVENous BID    nitroglycerin (NITROSTAT) tablet 0.4 mg  0.4 mg SubLINGual Q5MIN PRN    acetaminophen (TYLENOL) tablet 650 mg  650 mg Oral Q4H PRN    heparin (porcine) injection 5,000 Units  5,000 Units SubCUTAneous Q8H         Objective:      Physical Exam:  Visit Vitals  BP (!) 164/93 (BP 1 Location: Left upper arm, BP Patient Position: At rest)   Pulse (!) 56   Temp 98 °F (36.7 °C)   Resp 16   Ht 5' 5\" (1.651 m)   Wt 153 lb 14.1 oz (69.8 kg)   SpO2 100%   BMI 25.61 kg/m²     General Appearance:  Well developed, well nourished, alert , anxious individual in no acute distress. Ears/Nose/Mouth/Throat:   Hearing grossly normal.         Neck: Supple. Chest:   Lungs clear to auscultation bilaterally. Cardiovascular:  Regular rhythm, 2/6 systolic LSB murmur. Abdomen:   Soft, non-tender    Extremities: No edema bilaterally. Skin: Warm and dry.                Data Review:   Labs:    Recent Results (from the past 24 hour(s))   GLUCOSE, POC    Collection Time: 04/24/21 11:16 AM   Result Value Ref Range    Glucose (POC) 176 (H) 65 - 100 mg/dL    Performed by Anmol JACOBSON (CON)    GLUCOSE, POC    Collection Time: 04/24/21  4:54 PM   Result Value Ref Range    Glucose (POC) 120 (H) 65 - 100 mg/dL    Performed by Kobi Amaro    METABOLIC PANEL, COMPREHENSIVE    Collection Time: 04/25/21  8:44 AM   Result Value Ref Range    Sodium 135 (L) 136 - 145 mmol/L    Potassium 3.8 3.5 - 5.1 mmol/L    Chloride 103 97 - 108 mmol/L    CO2 23 21 - 32 mmol/L    Anion gap 9 5 - 15 mmol/L    Glucose 150 (H) 65 - 100 mg/dL    BUN 27 (H) 6 - 20 MG/DL    Creatinine 1.39 (H) 0.55 - 1.02 MG/DL    BUN/Creatinine ratio 19 12 - 20      GFR est AA 44 (L) >60 ml/min/1.73m2    GFR est non-AA 37 (L) >60 ml/min/1.73m2    Calcium 9.3 8.5 - 10.1 MG/DL    Bilirubin, total 1.1 (H) 0.2 - 1.0 MG/DL    ALT (SGPT) 9 (L) 12 - 78 U/L    AST (SGOT) 18 15 - 37 U/L    Alk. phosphatase 98 45 - 117 U/L    Protein, total 6.4 6.4 - 8.2 g/dL    Albumin 3.3 (L) 3.5 - 5.0 g/dL    Globulin 3.1 2.0 - 4.0 g/dL    A-G Ratio 1.1 1.1 - 2.2     CBC WITH AUTOMATED DIFF    Collection Time: 04/25/21  8:44 AM   Result Value Ref Range    WBC 7.1 3.6 - 11.0 K/uL    RBC 3.22 (L) 3.80 - 5.20 M/uL    HGB 10.0 (L) 11.5 - 16.0 g/dL    HCT 29.8 (L) 35.0 - 47.0 %    MCV 92.5 80.0 - 99.0 FL    MCH 31.1 26.0 - 34.0 PG    MCHC 33.6 30.0 - 36.5 g/dL    RDW 14.8 (H) 11.5 - 14.5 %    PLATELET 286 033 - 094 K/uL    MPV 8.8 (L) 8.9 - 12.9 FL    NRBC 0.0 0  WBC    ABSOLUTE NRBC 0.00 0.00 - 0.01 K/uL    NEUTROPHILS 77 (H) 32 - 75 %    LYMPHOCYTES 9 (L) 12 - 49 %    MONOCYTES 8 5 - 13 %    EOSINOPHILS 4 0 - 7 %    BASOPHILS 1 0 - 1 %    IMMATURE GRANULOCYTES 1 (H) 0.0 - 0.5 %    ABS. NEUTROPHILS 5.4 1.8 - 8.0 K/UL    ABS. LYMPHOCYTES 0.6 (L) 0.8 - 3.5 K/UL    ABS. MONOCYTES 0.6 0.0 - 1.0 K/UL    ABS. EOSINOPHILS 0.3 0.0 - 0.4 K/UL    ABS. BASOPHILS 0.1 0.0 - 0.1 K/UL    ABS. IMM.  GRANS. 0.1 (H) 0.00 - 0.04 K/UL    DF SMEAR SCANNED      RBC COMMENTS OVALOCYTES  PRESENT           Telemetry: Sinus bradycardia      Assessment:     Active Problems:    SOB (shortness of breath) (4/23/2021)    Acute on chronic systolic congestive heart failure  Previous left bundle branch block  CAD with history of PCI and CABG  Mitral valve regurgitation    Plan:   Acute on chronic systolic CHF: She is improving with diuretic, continue bumex 1 mg bid  Replaced potassium/added aldactone  Consider biventricular pacer (LVEF may improve with recent PCI, no ICD yet with recent PCI) but LBBB appears to resolve  So I need 12 lead ECG today  Sinus bradycardia so cannot increase coreg more    Change hydralazine to entresto 24/26 mg bid yesterday  Will increase dose tomorrow   BP may come down with ativan PRN for anxiety    CAD/CABG continue with aspirin plavix and Manuel Ridley M.D.  [facility]  Electrophysiology/Cardiology  [facility] [place]  [address] [place] [address]                              [phone]

## 2021-04-25 NOTE — PROGRESS NOTES
04/25/21 0225   CPAP/BIPAP   CPAP/BIPAP Start/Stop Off  (on S/B, not indicated at this time)   Device Mode BIPAP;S/T

## 2021-04-25 NOTE — PROGRESS NOTES
0730: Bedside shift change report received by Liz Burns (offgoing nurse). Report included the following information SBAR, Kardex, Procedure Summary, Intake/Output, MAR, Recent Results, and Cardiac Rhythm SR-SB .     1930: Bedside shift change report given to Lennyradha Torres (oncoming nurse). Report included the following information SBAR, Kardex, Procedure Summary, Intake/Output, MAR, Recent Results and Cardiac Rhythm SR-SB.     ----------------  Care Plan [id number]  Problem: Falls - Risk of  Goal: *Absence of Falls  Description: Document Christen He Fall Risk and appropriate interventions in the flowsheet.   Outcome: Progressing Towards Goal  Note: Fall Risk Interventions:  Mobility Interventions: Communicate number of staff needed for ambulation/transfer, OT consult for ADLs, Patient to call before getting OOB, PT Consult for mobility concerns         Medication Interventions: Evaluate medications/consider consulting pharmacy, Patient to call before getting OOB, Teach patient to arise slowly    Elimination Interventions: Call light in reach, Patient to call for help with toileting needs, Stay With Me (per policy), Toileting schedule/hourly rounds

## 2021-04-25 NOTE — PROGRESS NOTES
1930: Bedside and Verbal shift change report given to [address] (oncoming nurse) by Sammuel Bernheim (offgoing nurse). Report included the following information SBAR, Kardex, Intake/Output, Recent Results and Med Rec Status. 0730: Bedside and Verbal shift change report given to SOPHIA RN (oncoming nurse) by Ying Clifford RN (offgoing nurse). Report included the following information SBAR, Kardex, Intake/Output, Recent Results and Med Rec Status.

## 2021-04-25 NOTE — PROGRESS NOTES
Reason for Admission:   Shortness of breath                 RUR Score:     28%      PCP: First and Last name:  Berry Guzman DO     Name of Practice:  [facility]   Are you a current patient: Yes/No: Yes   Approximate date of last visit:    Can you do a virtual visit with your PCP:              Resources/supports as identified by patient/family:                   Top Challenges facing patient (as identified by patient/family and CM):   No family                     Finances/Medication cost? Pt is worried about her future besides she makes too much to qualify for Medicaid, but is anxious about continuing to live alone                   Transportation? Support system or lack thereof? No family                   Living arrangements? Lives alone              Self-care/ADLs/Cognition? Moderate Independent with ADLs and IADLs          Current Advanced Directive/Advance Care Plan:  DNR      Healthcare Decision Maker:   Click here to complete [address] including selection of the Healthcare Decision Maker Relationship (ie \"Primary\")        Payor Source Payor: [place] MEDICARE / Plan: [address] [place] [address] HMO / Product Type: Managed Care Medicare /                             Plan for utilizing home health:    Pt has been open to [facility] in the past and prefers to use them again                  Transition of Care Plan:    Pt was admitted from [facility], where she has been for about 3 weeks for SNF rehab. Prior to [facility], she was living at home alone. She has a Wheelchair, Thaddeus Guevara, and a bedside commode at home. Pt uses [facility] for her home health services and prefer to use them again when she returns home. Pt is anxious about living alone, but would still prefer to return home with [facility] instead of returning to [facility] for more rehab. Pt does not have any family to help assist her at home.   Pt's PCP is Dr. Cheko Tejada [facility]. Cm will continue to follow to see if patient can return home with home health or will need to return to SNF. Pt will likely require ambulance transport at time of discharge. Care Management Interventions  PCP Verified by CM:  Yes  Discharge Durable Medical Equipment: No(Has a wheelchair, walker, and bedside commode at home)  Physical Therapy Consult: Yes  Occupational Therapy Consult: Yes  Current Support Network: Lives Alone  The Patient and/or Patient Representative was Provided with a Choice of Provider and Agrees with the Discharge Plan?: Yes  Freedom of Choice List was Provided with Basic Dialogue that Supports the Patient's Individualized Plan of Care/Goals, Treatment Preferences and Shares the Quality Data Associated with the Providers?: Yes  The [employer] Information Provided?: No

## 2021-04-26 ENCOUNTER — APPOINTMENT (OUTPATIENT)
Dept: NON INVASIVE DIAGNOSTICS | Age: 81
DRG: 291 | End: 2021-04-26
Attending: FAMILY MEDICINE
Payer: MEDICARE

## 2021-04-26 LAB
ALBUMIN SERPL-MCNC: 3.2 G/DL (ref 3.5–5)
ALBUMIN/GLOB SERPL: 1 {RATIO} (ref 1.1–2.2)
ALP SERPL-CCNC: 97 U/L (ref 45–117)
ALT SERPL-CCNC: 8 U/L (ref 12–78)
ANION GAP SERPL CALC-SCNC: 4 MMOL/L (ref 5–15)
AST SERPL-CCNC: 18 U/L (ref 15–37)
BILIRUB SERPL-MCNC: 0.7 MG/DL (ref 0.2–1)
BUN SERPL-MCNC: 31 MG/DL (ref 6–20)
BUN/CREAT SERPL: 18 (ref 12–20)
CALCIUM SERPL-MCNC: 9.1 MG/DL (ref 8.5–10.1)
CHLORIDE SERPL-SCNC: 104 MMOL/L (ref 97–108)
CO2 SERPL-SCNC: 27 MMOL/L (ref 21–32)
CREAT SERPL-MCNC: 1.69 MG/DL (ref 0.55–1.02)
GLOBULIN SER CALC-MCNC: 3.2 G/DL (ref 2–4)
GLUCOSE SERPL-MCNC: 136 MG/DL (ref 65–100)
POTASSIUM SERPL-SCNC: 4.5 MMOL/L (ref 3.5–5.1)
PROT SERPL-MCNC: 6.4 G/DL (ref 6.4–8.2)
SODIUM SERPL-SCNC: 135 MMOL/L (ref 136–145)

## 2021-04-26 PROCEDURE — 74011250637 HC RX REV CODE- 250/637: Performed by: INTERNAL MEDICINE

## 2021-04-26 PROCEDURE — 93308 TTE F-UP OR LMTD: CPT

## 2021-04-26 PROCEDURE — 80053 COMPREHEN METABOLIC PANEL: CPT

## 2021-04-26 PROCEDURE — 74011250636 HC RX REV CODE- 250/636: Performed by: INTERNAL MEDICINE

## 2021-04-26 PROCEDURE — 77010033678 HC OXYGEN DAILY

## 2021-04-26 PROCEDURE — 65660000001 HC RM ICU INTERMED STEPDOWN

## 2021-04-26 PROCEDURE — 74011250637 HC RX REV CODE- 250/637: Performed by: FAMILY MEDICINE

## 2021-04-26 PROCEDURE — 97535 SELF CARE MNGMENT TRAINING: CPT

## 2021-04-26 PROCEDURE — 97161 PT EVAL LOW COMPLEX 20 MIN: CPT

## 2021-04-26 PROCEDURE — 36415 COLL VENOUS BLD VENIPUNCTURE: CPT

## 2021-04-26 PROCEDURE — 99223 1ST HOSP IP/OBS HIGH 75: CPT | Performed by: INTERNAL MEDICINE

## 2021-04-26 PROCEDURE — 97116 GAIT TRAINING THERAPY: CPT

## 2021-04-26 PROCEDURE — 97530 THERAPEUTIC ACTIVITIES: CPT

## 2021-04-26 PROCEDURE — 99233 SBSQ HOSP IP/OBS HIGH 50: CPT | Performed by: INTERNAL MEDICINE

## 2021-04-26 PROCEDURE — 74011250636 HC RX REV CODE- 250/636: Performed by: FAMILY MEDICINE

## 2021-04-26 RX ORDER — CARVEDILOL 12.5 MG/1
12.5 TABLET ORAL 2 TIMES DAILY WITH MEALS
Status: DISCONTINUED | OUTPATIENT
Start: 2021-04-26 | End: 2021-04-27

## 2021-04-26 RX ORDER — BUMETANIDE 1 MG/1
2 TABLET ORAL DAILY
Status: DISCONTINUED | OUTPATIENT
Start: 2021-04-27 | End: 2021-04-30 | Stop reason: HOSPADM

## 2021-04-26 RX ADMIN — SPIRONOLACTONE 25 MG: 25 TABLET ORAL at 10:08

## 2021-04-26 RX ADMIN — Medication 10 ML: at 15:31

## 2021-04-26 RX ADMIN — Medication 10 ML: at 21:31

## 2021-04-26 RX ADMIN — CLOPIDOGREL BISULFATE 75 MG: 75 TABLET ORAL at 10:08

## 2021-04-26 RX ADMIN — ATORVASTATIN CALCIUM 40 MG: 40 TABLET, FILM COATED ORAL at 21:31

## 2021-04-26 RX ADMIN — Medication 10 ML: at 06:00

## 2021-04-26 RX ADMIN — SACUBITRIL AND VALSARTAN 1 TABLET: 24; 26 TABLET, FILM COATED ORAL at 10:08

## 2021-04-26 RX ADMIN — ASPIRIN 81 MG: 81 TABLET, COATED ORAL at 21:31

## 2021-04-26 RX ADMIN — CARVEDILOL 12.5 MG: 12.5 TABLET, FILM COATED ORAL at 16:02

## 2021-04-26 RX ADMIN — HEPARIN SODIUM 5000 UNITS: 5000 INJECTION INTRAVENOUS; SUBCUTANEOUS at 11:48

## 2021-04-26 RX ADMIN — SACUBITRIL AND VALSARTAN 1 TABLET: 24; 26 TABLET, FILM COATED ORAL at 21:31

## 2021-04-26 RX ADMIN — CARBIDOPA AND LEVODOPA 2 TABLET: 25; 100 TABLET ORAL at 11:54

## 2021-04-26 RX ADMIN — DULOXETINE HYDROCHLORIDE 120 MG: 60 CAPSULE, DELAYED RELEASE ORAL at 10:08

## 2021-04-26 RX ADMIN — LORAZEPAM 0.25 MG: 2 INJECTION INTRAMUSCULAR; INTRAVENOUS at 15:52

## 2021-04-26 RX ADMIN — HEPARIN SODIUM 5000 UNITS: 5000 INJECTION INTRAVENOUS; SUBCUTANEOUS at 04:20

## 2021-04-26 RX ADMIN — CARBIDOPA AND LEVODOPA 2 TABLET: 25; 100 TABLET ORAL at 17:38

## 2021-04-26 RX ADMIN — CARBIDOPA AND LEVODOPA 2 TABLET: 25; 100 TABLET ORAL at 10:08

## 2021-04-26 RX ADMIN — CARBIDOPA AND LEVODOPA 2 TABLET: 25; 100 TABLET ORAL at 21:31

## 2021-04-26 RX ADMIN — PANTOPRAZOLE SODIUM 40 MG: 40 TABLET, DELAYED RELEASE ORAL at 06:34

## 2021-04-26 RX ADMIN — LORAZEPAM 0.5 MG: 2 INJECTION INTRAMUSCULAR; INTRAVENOUS at 10:18

## 2021-04-26 RX ADMIN — CARVEDILOL 3.12 MG: 3.12 TABLET, FILM COATED ORAL at 10:08

## 2021-04-26 RX ADMIN — HEPARIN SODIUM 5000 UNITS: 5000 INJECTION INTRAVENOUS; SUBCUTANEOUS at 21:31
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
## 2021-04-26 NOTE — PROGRESS NOTES
Palliative Medicine  [place]: [phone] ([phone])  [facility]: [phone] ([phone])        Code Status: DNR    Advance Care Planning:  Advance Care Planning 4/26/2021   Confirm Advance Directive Yes, on file   Patient Would Like to Complete Advance Directive -   Does the patient have other document types -     Primary Decision Maker: Alec Mcgee - Other Relative - [phone]    Psycho: pleasant, hx of anxiety  Social: never , no kids, lives alone, retired teacher and , 5 nieces: Nacho Payne is MPOA but they are currently not talking, has friends in her community but none she would count on for support in home. Baseline: independent ADL/iADLs but this is declining, requesting wheelchair as walking is very painful    Patient / Family Encounter Documentation    Participants (names): Patient, Palliative Medicine (Dr. Didi Torres [facility])    Narrative: This SW alongside Dr. Lillian Munoz met with [facility] room. She was sitting in bedside chair, alert and oriented to all spheres. Palliative Medicine introduced. Discussed course of admission, treatment plan. Patient expressed concern re: not being able to walk/needing wheelchair, not having transport to get to doctor's appts and medical bills piling up. She lives independently but is recognizing she needs more help now. She has friends at her current living situation but no one who can help in the home or drive her. Patient agreeable for group home, but doesn't know how she will pay for it. Discussed medicaid spend down. She is open to med assist consult. Patient has applied for medicaid in past but was denied. Discussed AMD. Patient has appointed her niece Nacho Payne. She reported she has not spoken to Nacho Payne in 3 years - she says they have been estranged due to an incident/falling out but also wants to keep her as MPOA. Patient's other niece is her Durable POA. Discussed anxiety/coping skills.  Patient reported she has always been Salazar Obdulia strung\" and uses breathing exercises and mindfulness to get relief (along with Cymbalta and Ativan). She is open to working with this SW tomorrow on guided relaxation. Discussed [address] - patient said she is willing to consider aggressive interventions at this time. Discussed there may be a time when she no longer wants to keep coming back to hospital, would opt for more gentle care. At this time, patient would like to continue to follow with specialists. Psychosocial challenges/Resilience factors:     Patient has very limited social support. Patient facing need for more support with ADLs/iADLs. With lack of support and/or caregivers in family/community, patient will need AL facility/possible medicaid spend down to afford. Patient with history of anxiety. Shaun with breathing exercises and talking herself down. She has been prescribed cymbalta in past as well as Ativan. \"I'm not sure it's working. \"      Goals of Care / Plan:     F/u Tues. 2 pm for anxiety support    Provide transportation resources    Med assist consult re: spend down to qualify for snf    CM updated      Thank you for the opportunity to be involved in the care of Ms. Ramos and her family.     Bertha Edge LMSW, Supervisee in Social Work  Palliative Medicine   [phone]    Start time: 14  End time: 13

## 2021-04-26 NOTE — PROGRESS NOTES
Attempted a follow up visit with pt. Unable to access pt as she was being attended to by medical staff.   Chaplain Pagan MDiv, MS, [facility]  [phone] ([phone])

## 2021-04-26 NOTE — PROGRESS NOTES
Cardiology Consult/Progress Note           4/23/2021  6:29 AM   SOB (shortness of breath) [R06.02]   Requesting physician: Dr. Mara Darden, Dr. Burch Done    Subjective: Feel much improved and lost 6 Kg weight over the last 3-4 days. Assessment and Plan     1. Coronary disease status post coronary bypass grafting with recent PCI of distal LAD through the LIMA graft. 2.  Mild to moderately reduced LV systolic function  3. Heart failure reduced ejection fraction secondary to #1 and #2  4. At least moderate to severe mitral regurgitation likely functional in nature  5. History of cerebrovascular disease status post carotid endarterectomy and prior CVA  6. Hypertension with intermittent episodes of hypertensive emergency  7. Moderate renal artery disease but not critical enough to require intervention  8. History of paroxysmal atrial fibrillation  9. Intermittent left bundle branch block  10. Acute on chronic systolic heart failure secondary to #1 and #9  11. Acute hypoxemic respiratory failure secondary to #1, #4, #10  Ms. Ramos present to the hospital with acute hypoxemic respiratory failure with sats in 70s. I believe is a combination of acute decompensated congestive heart failure, likely hypertensive emergency, functional mitral regurgitation getting worse in the setting of hypertensive emergency. After undergoing IV diuresis she reports significant improvement of symptoms and is currently on minimal to no oxygen requirement. Her diuretic therapy can be switched from IV to oral therapy tomorrow. Okay to hold IV Bumex today. .  Blood pressure continues to be elevated and may require further optimization. Will increase Coreg to 12.5 mg twice a day. Over the weekend she had addition of Entresto; creatinine took slight bump; will monitor. Discussed with Dr. Flower Llanes; not a candidate for CRT given left bundle being intermittent.     If she continues to have recurrent hospitalizations for acute decompensated heart failure/pulmonary edema, it is likely to be from a combination of uncontrolled blood pressures and functional mitral regurgitation. Continue optimizing antihypertensive therapies and if they fail may require consideration for potential intervention on mitral valve with MitraClip procedure    [x]    High complexity decision making was performed  [x]    Patient is at high-risk of decompensation with multiple organ involvement    HPI:   Eliane Escalona is a 79 y.o. female admitted for SOB (shortness of breath) [R06.02]. She is a patient of Dr. Jose R Jones and Dr. Tere Ga who has history of coronary artery disease, is status post coronary artery bypass grafting and has had recurrent hospitalizations for congestive heart failure. She is currently admitted to the hospital with progressive shortness of breath of 2 days duration. Right before being brought to the emergency room, she was extremely hypoxemic with significant orthopnea and PND. No chest pain was reported by the patient. Notably she had history of hypertensive heart disease as well with previously uncontrolled blood pressures. She has moderate renal disease. EF has been ranging between 30 to 50% based on whether is performed when she is in the left bundle branch block versus narrow conduction. 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 Moreover a few weeks ago she had presented to the hospital with chest discomfort during which she had PCI performed to her native LAD through LIMA graft. She has residual small vessel disease which was deferred. She was discharged after optimizing her blood pressure and heart failure therapies. Plan was to consider cardiac resynchronization therapy downstream assuming her EF and heart failure symptoms do not improve.   She also underwent a renal angiogram because of persistent hypertension which demonstrated moderate renal arterial disease but not severe. Investigation:  Telemetry: normal sinus rhythm  ECG: ECGs were reviewed over the past few months and demonstrated left bundle branch block on certain occasions and normal conduction another occasion. Echocardiogram: Echocardiogram from 2020 showed normal EF. Echocardiogram from April 2021 was personally reviewed. During the beats when she had left bundle branch block EF appeared to be about 35% and at other times when she had narrow conduction EF appeared to be almost 50%. She also had significant mitral regurgitation on echo in March 2021. Review of Symptoms:  Respiratory: No exertional dyspnea, orthopnea, PND, cough, hemoptysis, URI. Cardiovascular: No CP, palpitations, sweating, lightheadedness, dizziness, syncope, presyncope.  + lower extremity swelling. *Otherwise no other pertinent positive or negative symptoms on ROS.      Patient Active Problem List    Diagnosis Date Noted    SOB (shortness of breath) 04/23/2021    UTI (urinary tract infection) 04/09/2021    [facility] 09/25/2020    CHF (congestive heart failure) ([facility] 09/22/2020    Weakness 05/04/2020    Generalized weakness 04/30/2020    Carotid artery stenosis, symptomatic, left 07/26/2019    HTN (hypertension) 07/17/2019    Acute ischemic stroke ([facility] 07/12/2019    Fall 12/24/2018    CKD (chronic kidney disease), stage III ([facility] 07/08/2015    Edema 05/06/2015    Diabetes mellitus ([facility] 05/06/2015    Postoperative anemia due to acute blood loss 02/14/2015    S/P CABG (coronary artery bypass graft) 02/13/2015    Syncope 02/09/2015    Bradycardia 02/09/2015    Acute kidney injury ([facility] 02/09/2015    Anemia 09/09/2014    GI bleed 09/09/2014    AF (atrial fibrillation) (formerly [facility]) 06/20/2014    Hypotension 06/03/2014    Coronary artery disease 06/03/2014    S/P coronary artery stent placement 06/03/2014    Renal failure 06/03/2014    Elevated troponin 06/03/2014    Pericardial effusion 06/03/2014  Lactic acidosis 2014      Alvarez Lo DO  Past Medical History:   Diagnosis Date    Anxiety disorder     Atrial fibrillation [facility])     CAD (coronary artery disease) 2007    stents, CABG x 3v    Carotid stenosis     Cervical stenosis of spinal canal 2019    Chronic kidney disease     Cough     CVA (cerebral vascular accident) ([facility] 2019    left lacunar infarct at head of caudate    Depression     AND CHRONIC ANXIETY    Diabetes (HCC)     GERD (gastroesophageal reflux disease)     High cholesterol     History of peptic ulcer     Bleeding ulcer with increased NSAID use    Hypertension     Left carotid stenosis 2019    s/p left CEA with Dr. Agus Jimenez MI, old 2007    PUD (peptic ulcer disease)     Stroke ([facility]     Tremor     Valvular heart disease       Past Surgical History:   Procedure Laterality Date    HX CAROTID ENDARTERECTOMY  2019    HX CORONARY ARTERY BYPASS GRAFT      HX TONSILLECTOMY  1963    AR CARDIAC SURG PROCEDURE UNLIST      CABG X3 VESSEL    AR TOTAL KNEE ARTHROPLASTY Right 2015     Social History     Socioeconomic History    Marital status: SINGLE     Spouse name: Not on file    Number of children: Not on file    Years of education: Not on file    Highest education level: Not on file   Occupational History    Occupation: Retired realestate/teacher   Tobacco Use    Smoking status: Former Smoker     Packs/day: 0.25     Years: 5.00     Pack years: 1.25     Types: Cigarettes     Quit date:      Years since quittin.3    Smokeless tobacco: Never Used   Substance and Sexual Activity    Alcohol use:  Yes     Alcohol/week: 0.0 standard drinks     Comment: Rare    Drug use: No   Social History Narrative    Lives in [facility]     Family History   Problem Relation Age of Onset    Heart Attack Mother 72        Dec 79yo    Hypertension Mother     Other Father         Unknown    Parkinson's Disease Brother     Anesth Problems Neg Hx Current Facility-Administered Medications   Medication Dose Route Frequency    LORazepam (ATIVAN) injection 0.5 mg  0.5 mg IntraVENous Q6H PRN    sacubitriL-valsartan (ENTRESTO) 24-26 mg tablet 1 Tab  1 Tab Oral Q12H    spironolactone (ALDACTONE) tablet 25 mg  25 mg Oral DAILY    albuterol (PROVENTIL VENTOLIN) nebulizer solution 2.5 mg  2.5 mg Nebulization Q4H PRN    aspirin delayed-release tablet 81 mg  81 mg Oral QHS    atorvastatin (LIPITOR) tablet 40 mg  40 mg Oral QHS    carbidopa-levodopa (SINEMET)  mg per tablet 2 Tab  2 Tab Oral QID    carvediloL (COREG) tablet 3.125 mg  3.125 mg Oral BID WITH MEALS    clopidogreL (PLAVIX) tablet 75 mg  75 mg Oral DAILY AFTER BREAKFAST    DULoxetine (CYMBALTA) capsule 120 mg  120 mg Oral DAILY    pantoprazole (PROTONIX) tablet 40 mg  40 mg Oral ACB    sodium chloride (NS) flush 5-40 mL  5-40 mL IntraVENous Q8H    sodium chloride (NS) flush 5-40 mL  5-40 mL IntraVENous PRN    [Held by provider] bumetanide (BUMEX) injection 1 mg  1 mg IntraVENous BID    nitroglycerin (NITROSTAT) tablet 0.4 mg  0.4 mg SubLINGual Q5MIN PRN    acetaminophen (TYLENOL) tablet 650 mg  650 mg Oral Q4H PRN    heparin (porcine) injection 5,000 Units  5,000 Units SubCUTAneous Q8H      Prior to Admission Medications   Prescriptions Last Dose Informant Patient Reported? Taking? Cholecalciferol, Vitamin D3, 1,000 unit cap   Yes No   Sig: Take 1,000 Units by mouth daily. DULoxetine (CYMBALTA) 60 mg capsule   Yes No   Sig: Take 120 mg by mouth daily. Indications: Anxiousness associated with Depression   acetaminophen (TYLENOL) 325 mg tablet   Yes No   Sig: Take 650 mg by mouth every six (6) hours as needed for Pain or Fever. aspirin delayed-release (Ecotrin Low Strength) 81 mg tablet   Yes No   Sig: Take 81 mg by mouth nightly. atorvastatin (LIPITOR) 40 mg tablet   Yes No   Sig: Take 40 mg by mouth nightly.    carbidopa-levodopa (SINEMET)  mg per tablet   No No   Sig: Take 2 Tabs by mouth four (4) times daily. clopidogreL (Plavix) 75 mg tab   Yes No   Sig: Take 75 mg by mouth daily (after breakfast). cyanocobalamin (VITAMIN B12) 100 mcg tablet   Yes No   Sig: Take 100 mcg by mouth daily. diazePAM (VALIUM) 2 mg tablet   Yes No   Sig: Take 2 mg by mouth three (3) times daily. Indications: CHRONIC PAIN   multivitamins-minerals-lutein (CENTRUM SILVER) tab tablet   Yes No   Sig: Take 1 Tab by mouth daily. nitroglycerin (Nitrostat) 0.4 mg SL tablet   Yes No   Si.4 mg by SubLINGual route every five (5) minutes as needed for Chest Pain. Up to 3 doses. pantoprazole (PROTONIX) 40 mg tablet   Yes No   Sig: Take 40 mg by mouth Daily (before breakfast). Indications: h/o bleeding ulcer with nsaid   senna-docusate (SENNA WITH DOCUSATE SODIUM) 8.6-50 mg per tablet   Yes No   Sig: Take 1 Tab by mouth nightly. vit C,F-Dn-dgpzr-lutein-zeaxan (PRESERVISION AREDS-2) 788-262-35-1 mg-unit-mg-mg cap capsule   Yes No   Sig: Take 1 Cap by mouth two (2) times a day. Facility-Administered Medications: None      No Known Allergies    Labs:   Recent Results (from the past 24 hour(s))   METABOLIC PANEL, COMPREHENSIVE    Collection Time: 21 12:02 AM   Result Value Ref Range    Sodium 135 (L) 136 - 145 mmol/L    Potassium 4.5 3.5 - 5.1 mmol/L    Chloride 104 97 - 108 mmol/L    CO2 27 21 - 32 mmol/L    Anion gap 4 (L) 5 - 15 mmol/L    Glucose 136 (H) 65 - 100 mg/dL    BUN 31 (H) 6 - 20 MG/DL    Creatinine 1.69 (H) 0.55 - 1.02 MG/DL    BUN/Creatinine ratio 18 12 - 20      GFR est AA 35 (L) >60 ml/min/1.73m2    GFR est non-AA 29 (L) >60 ml/min/1.73m2    Calcium 9.1 8.5 - 10.1 MG/DL    Bilirubin, total 0.7 0.2 - 1.0 MG/DL    ALT (SGPT) 8 (L) 12 - 78 U/L    AST (SGOT) 18 15 - 37 U/L    Alk.  phosphatase 97 45 - 117 U/L    Protein, total 6.4 6.4 - 8.2 g/dL    Albumin 3.2 (L) 3.5 - 5.0 g/dL    Globulin 3.2 2.0 - 4.0 g/dL    A-G Ratio 1.0 (L) 1.1 - 2.2         Intake/Output Summary (Last 24 hours) at 4/26/2021 1138  Last data filed at 4/26/2021 0544  Gross per 24 hour   Intake    Output 1400 ml   Net -1400 ml      Patient Vitals for the past 24 hrs:   Temp Pulse Resp BP SpO2   04/26/21 1008  65      04/26/21 0958  (!) 56  (!) 152/46 95 %   04/26/21 0939    (!) 142/94 99 %   04/26/21 0800     100 %   04/26/21 0718 98.1 °F (36.7 °C) 60 16 (!) 176/66 99 %   04/26/21 0344     100 %   04/26/21 0308 98.6 °F (37 °C) (!) 58 22 (!) 161/59 99 %   04/25/21 2358 98.6 °F (37 °C) (!) 59 14 (!) 161/57 99 %   04/25/21 2303     98 %   04/25/21 1920 98.6 °F (37 °C) 62 20 (!) 145/69 97 %   04/25/21 1526 98.3 °F (36.8 °C) 63 20 (!) 132/44 96 %   04/25/21 1220 98.3 °F (36.8 °C) 62 19 (!) 151/53 98 %        General:    Alert, cooperative, no distress. C/o feet pain   Psychiatric:   Normal Mood and slow affect    Eye/ENT:   Pupils equal, No asymmetry, Conjunctival pink. Able to hear voice at normal amplitude   Lungs:   Visibly symmetric chest expansion, No palpable tenderness. Clear to auscultation bilaterally. Heart:    Regular rate and rhythm, S1, S2 normal, no murmur, click, rub or gallop. No JVD, Normal palpable     peripheral pulses. No cyanosis. 2+ radial pulses B/L   Abdomen:    Soft, non-tender. Bowel sounds normal. No masses,  No organomegaly. Extremities:   Extremities normal, atraumatic. 2+ edema B/L LE   Neurologic:   CN II-XII grossly intact.  No gross focal deficits       Merna Parks MD  4/26/2021   11:07 AM      [facility] Office:   [facility] Office:  [address]    [place] [address] [facility]  [address]     [address] [place], [place] [address]  P: [phone]    P: [phone]  F: [phone]    F: [phone]

## 2021-04-26 NOTE — PROGRESS NOTES
Verbal bedside report given to [facility] OF JOSE RN oncoming nurse by MARCI Breaux RN off-going nurse. Report included current pt status and condition, recent results, hx of present illness, heart rate and rhythm, and respiratory status.

## 2021-04-26 NOTE — CONSULTS
Palliative Medicine Consult  [place]: [phone] ([phone])    Patient Name: Rosana Gutierrez  YOB: 1940    Date of Initial Consult: 4/26/2021  Reason for Consult: care decisions  Requesting Provider: Dr. Aaron Ruth  Primary Care Physician: Berry Guzman DO     SUMMARY:   Rosana Gutierrez is a 79 y.o. with a past history of CAD s/p CABG s/p PCI, paroxysmal afib, intermittent LBBB, hx cerebral vascular disease s/p carotid endarterectomy, CKD3, HTN, moderate renal artery disease, Parkinson's disease, anxiety disorder recent admission 3/29 to 4/9 with ESBL UTI and CHF, who was admitted on 4/23/2021 from home with a diagnosis of Shortness of breath, acute respiratory failure after being home only one day from SNF. Current medical issues leading to Palliative Medicine involvement include: acute respiratory failure due to acute CHF/ hypertensive emergency/ MR, improved with BP optimization and diuresis. Patient was never , no children. She has 5 nieces but is not in close contact with them. ADAM (AMD 2015) is niece, Georgia Marie is niece, [facility]. Lives independently at the [facility]. Retired from teaching     PALLIATIVE DIAGNOSES:   1. Shortness of breath  2. Acute on chronic respiratory failure, multifactorial, CHF, improved with diuresis and BP control  3. Debility, fall risk, Parkinson's disease  4. Anxiety disorder, not well controlled on her chronic meds. 5. CKD3  6. Care goals discussion       PLAN:   1. Palliative medicine services introduced to patient as added layer of support. 1. See also Joelle Kraus note, more details of conversation  2. Recommend referral for medicaid screening. Patient aware she will need more help in future taking care of herself. She is struggling to live independently. 3. Anxiety is not well controlled with her long term cymbalta.  However, chronic benzo use is not a good solution for her-- would put her at high risk of mortality, delirium and falls. 1. I explained need to be off the ativan prior to going home. 2. She says that there is no way for medicines to sedate her she is so wound up. Hasnt' been sleeping in hospital   3. Consider adding other meds as outpatient if still having ongoing anxiety in her home environment.  (phone based counseling? Transportation is difficult)   4. Relaxation technique training planned w our team for tomorrow afternoon. 2. Explored her mPOA from 2015, she is satisfied with that right now, does want to leave niece at [facility]) but admits they haven't spoken in about 3 years. 1. She does NOT want us to call [facility] at this time. 3. Will plan to offer DDNR paperwork prior to discharge. 4. Initial consult note routed to primary continuity provider and/or primary health care team members  5. Communicated plan of care with: Palliative IDT, [facility] Team, case management     GOALS OF CARE / TREATMENT PREFERENCES:     GOALS OF CARE:  Patient/Health Care Proxy Stated Goals: Rehabilitation    TREATMENT PREFERENCES:   Code Status: DNR    Advance Care Planning:  [] [facility] Interdisciplinary Team has updated the ACP Navigator with Health Care Decision Maker and Patient Capacity      Primary Decision Maker: Geni Sarath - Other Relative - [phone]  Advance Care Planning 4/26/2021   Confirm Advance Directive Yes, on file   Patient Would Like to Complete Advance Directive -   Does the patient have other document types -       Medical Interventions: Limited additional interventions     Other Instructions: Other:    As far as possible, the palliative care team has discussed with patient / health care proxy about goals of care / treatment preferences for patient.      HISTORY:     History obtained from: chart, patient    CHIEF COMPLAINT: short of breath    HPI/SUBJECTIVE:    The patient is:   [x] Verbal and participatory  [] Non-participatory due to:     79year old female with recent admission 3/29 to 4/9 for CHF and ESBL UTI, discharged to [facility]. She returned home for one day, then became very SOB and called 911  sats were in the 70s when EMS arrived. She has now improved greatly with diuresis and BP control. Still concerned about living alone, hardest thing is affording transportation when she needs a WC. But can't afford halfway, makes too much to get medicaid- tried 3 years ago. Clinical Pain Assessment (nonverbal scale for severity on nonverbal patients):              Duration: for how long has pt been experiencing pain (e.g., 2 days, 1 month, years)  Frequency: how often pain is an issue (e.g., several times per day, once every few days, constant)     FUNCTIONAL ASSESSMENT:     Palliative Performance Scale (PPS):  PPS: 30       PSYCHOSOCIAL/SPIRITUAL SCREENING:     Palliative IDT has assessed this patient for cultural preferences / practices and a referral made as appropriate to needs (Cultural Services, Patient Advocacy, Ethics, etc.)    Any spiritual / Jehovah's witness concerns:  [] Yes /  [x] No    Caregiver Burnout:  [] Yes /  [x] No /  [] No Caregiver Present      Anticipatory grief assessment:   [x] Normal  / [] Maladaptive       ESAS Anxiety: Anxiety: 5    ESAS Depression:          REVIEW OF SYSTEMS:     Positive and pertinent negative findings in ROS are noted above in HPI. The following systems were [x] reviewed / [] unable to be reviewed as noted in HPI  Other findings are noted below. Systems: constitutional, ears/nose/mouth/throat, respiratory, gastrointestinal, genitourinary, musculoskeletal, integumentary, neurologic, psychiatric, endocrine. Positive findings noted below.   Modified ESAS Completed by: provider   Fatigue: 0           Anxiety: 5     Anorexia: 0 Dyspnea: 2     Constipation: No     Stool Occurrence(s): 1        PHYSICAL EXAM:     From RN flowsheet:  Wt Readings from Last 3 Encounters:   04/26/21 66.4 kg (146 lb 4.8 oz) 04/08/21 72.3 kg (159 lb 4.8 oz)   10/04/20 74.6 kg (164 lb 8 oz)     Blood pressure 102/74, pulse 65, temperature 98.4 °F (36.9 °C), resp. rate 13, height 5' 5\" (1.651 m), weight 66.4 kg (146 lb 4.8 oz), SpO2 98 %. Pain Scale 1: Numeric (0 - 10)  Pain Intensity 1: 0                 Last bowel movement, if known:     Constitutional: awake alert sitting up in chair NAD  Nasal cannula in place  Eyes: pupils equal, anicteric  Affect normal, engaging in conversation  Speech fluent  Insight intact     HISTORY:     Active Problems:    SOB (shortness of breath) (4/23/2021)      Past Medical History:   Diagnosis Date    Anxiety disorder     Atrial fibrillation ([address]     CAD (coronary artery disease) 2007    stents, CABG x 3v    Carotid stenosis     Cervical stenosis of spinal canal 07/2019    Chronic kidney disease     Cough     CVA (cerebral vascular accident) ([address] 07/2019    left lacunar infarct at head of caudate    Depression     AND CHRONIC ANXIETY    Diabetes ([address]     GERD (gastroesophageal reflux disease)     High cholesterol     History of peptic ulcer     Bleeding ulcer with increased NSAID use    Hypertension     Left carotid stenosis 07/2019    s/p left CEA with Dr. Tessa Campbell MI, old 2007    PUD (peptic ulcer disease)     Stroke ([address]     Tremor     Valvular heart disease       Past Surgical History:   Procedure Laterality Date    HX CAROTID ENDARTERECTOMY  07/2019    HX CORONARY ARTERY BYPASS GRAFT      HX TONSILLECTOMY  1963    VT CARDIAC SURG PROCEDURE UNLIST      CABG X3 VESSEL    VT TOTAL KNEE ARTHROPLASTY Right 2015      Family History   Problem Relation Age of Onset    Heart Attack Mother 72        Dec 81yo    Hypertension Mother     Other Father         Unknown    Parkinson's Disease Brother     Anesth Problems Neg Hx       History reviewed, no pertinent family history.   Social History     Tobacco Use    Smoking status: Former Smoker     Packs/day: 0.25 Years: 5.00     Pack years: 1.25     Types: Cigarettes     Quit date: 56     Years since quittin.3    Smokeless tobacco: Never Used   Substance Use Topics    Alcohol use:  Yes     Alcohol/week: 0.0 standard drinks     Comment: Rare     No Known Allergies   Current Facility-Administered Medications   Medication Dose Route Frequency    carvediloL (COREG) tablet 12.5 mg  12.5 mg Oral BID WITH MEALS    [START ON 2021] bumetanide (BUMEX) tablet 2 mg  2 mg Oral DAILY    LORazepam (ATIVAN) injection 0.5 mg  0.5 mg IntraVENous Q6H PRN    sacubitriL-valsartan (ENTRESTO) 24-26 mg tablet 1 Tab  1 Tab Oral Q12H    spironolactone (ALDACTONE) tablet 25 mg  25 mg Oral DAILY    albuterol (PROVENTIL VENTOLIN) nebulizer solution 2.5 mg  2.5 mg Nebulization Q4H PRN    aspirin delayed-release tablet 81 mg  81 mg Oral QHS    atorvastatin (LIPITOR) tablet 40 mg  40 mg Oral QHS    carbidopa-levodopa (SINEMET)  mg per tablet 2 Tab  2 Tab Oral QID    clopidogreL (PLAVIX) tablet 75 mg  75 mg Oral DAILY AFTER BREAKFAST    DULoxetine (CYMBALTA) capsule 120 mg  120 mg Oral DAILY    pantoprazole (PROTONIX) tablet 40 mg  40 mg Oral ACB    sodium chloride (NS) flush 5-40 mL  5-40 mL IntraVENous Q8H    sodium chloride (NS) flush 5-40 mL  5-40 mL IntraVENous PRN    nitroglycerin (NITROSTAT) tablet 0.4 mg  0.4 mg SubLINGual Q5MIN PRN    acetaminophen (TYLENOL) tablet 650 mg  650 mg Oral Q4H PRN    heparin (porcine) injection 5,000 Units  5,000 Units SubCUTAneous Q8H          LAB AND IMAGING FINDINGS:     Lab Results   Component Value Date/Time    WBC 7.1 2021 08:44 AM    HGB 10.0 (L) 2021 08:44 AM    PLATELET 875  08:44 AM     Lab Results   Component Value Date/Time    Sodium 135 (L) 2021 12:02 AM    Potassium 4.5 2021 12:02 AM    Chloride 104 2021 12:02 AM    CO2 27 2021 12:02 AM    BUN 31 (H) 2021 12:02 AM    Creatinine 1.69 (H) 2021 12:02 AM    Calcium 9.1 04/26/2021 12:02 AM    Magnesium 1.9 04/09/2021 02:51 AM    Phosphorus 2.8 04/09/2021 02:52 AM      Lab Results   Component Value Date/Time    Alk. phosphatase 97 04/26/2021 12:02 AM    Protein, total 6.4 04/26/2021 12:02 AM    Albumin 3.2 (L) 04/26/2021 12:02 AM    Globulin 3.2 04/26/2021 12:02 AM     Lab Results   Component Value Date/Time    INR 1.1 03/18/2020 09:09 AM    Prothrombin time 11.0 03/18/2020 09:09 AM    aPTT 25.6 04/05/2021 07:22 PM      Lab Results   Component Value Date/Time    Iron 55 03/31/2021 03:55 AM    TIBC 280 03/31/2021 03:55 AM    Iron % saturation 20 03/31/2021 03:55 AM    Ferritin 119 03/31/2021 03:55 AM      No results found for: PH, PCO2, PO2  No components found for: [place]   Lab Results   Component Value Date/Time    CK 75 07/11/2019 09:35 AM    CK - MB 2.7 07/11/2019 09:35 AM                Total time: 70min  Counseling / coordination time, spent as noted above: 45  > 50% counseling / coordination?: yes    Prolonged service was provided for  []30 min   []75 min in face to face time in the presence of the patient, spent as noted above. Time Start:   Time End:   Note: this can only be billed with 19363 (initial) or 42999 (follow up). If multiple start / stop times, list each separately.

## 2021-04-26 NOTE — PROGRESS NOTES
04/25/21 4360   CPAP/BIPAP   CPAP/BIPAP Start/Stop Off  (VS stable on NC, Not inidacated at this time)

## 2021-04-26 NOTE — PROGRESS NOTES
Problem: Self Care Deficits Care Plan (Adult)  Goal: *Acute Goals and Plan of Care (Insert Text)  Description:   FUNCTIONAL STATUS PRIOR TO ADMISSION: Pt resides alone in single story apartment, reporting [place] with self-care at W/C level, briefly standing with use of RW; however, progressive difficulty with stand tolerance 2/2 hypersensitivity to BLE. Pt reports she has 6inch lipped step in shower with shower chair. Pt reports she is nervous about being able to care for herself at home, with little to no local support; however, pt reports desires to return home with [facility] therapy. HOME SUPPORT: The patient lived alone with no local support. Occupational Therapy Goals  Initiated 4/25/2021  1. Patient will perform W/C grooming with modified independence within 7 day(s). 2.  Patient will perform EOB bathing with set-up and AE PRN within 7 day(s). 3.  Patient will perform EOB/RW lower body dressing with set-up and AE PRN within 7 day(s). 4.  Patient will perform toilet transfers, using RW, with modified independence within 7 day(s). 5.  Patient will perform all aspects of toileting, using RW, with modified independence within 7 day(s). 6.  Patient will complete EOB to W/C standing pivot turn transfer, using RW, with modified independent within 7 day(s). Outcome: Progressing Towards Goal   OCCUPATIONAL THERAPY TREATMENT  Patient: Katalina Torrez (89 y.o. female)  Date: 4/26/2021  Diagnosis: SOB (shortness of breath) [R06.02] <principal problem not specified>      Precautions: Fall, Contact, DNR, Bed Alarm  Chart, occupational therapy assessment, plan of care, and goals were reviewed. ASSESSMENT  Patient continues with skilled OT services and is progressing towards goals. Received in chair willing to work w/OT.  O x person and \"hospital.\" Perseverative but redirectable regarding previous therpy sessions, instructions given, names/roles etc. LE ADLs limited by R LE \"weakness\" per patient rahter than pain, unable to reach R foot. Should benefit from LE AE trail next session    Current Level of Function Impacting Discharge (ADLs): Min A LE ADLs, R side limited, \"too weak\"  Other factors to consider for discharge: was mod I at w/c level PTA, recently completed 3 weeks SNF level rehab     PLAN :  Patient continues to benefit from skilled intervention to address the above impairments. Continue treatment per established plan of care to address goals. Recommend with staff: fall prevention    Recommend next OT session: LE AE    Recommendation for discharge: (in order for the patient to meet his/her long term goals)  SNF vs HH at w/c level ; would be helpful to have home w/c available for discharge planning/training      This discharge recommendation:  Has been made in collaboration with the attending provider and/or case management    IF patient discharges home will need the following DME: TBA       SUBJECTIVE:   Patient stated I know that's what the girl told me. 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\")  Safety/Judgement: Fall prevention;Decreased insight into deficits; Decreased awareness of need for safety;Decreased awareness of need for assistance    Functional Mobility and Transfers for ADLs:    Transfers:  Sit to Stand: Contact guard assistance with RW          Balance:  Sitting: Intact; Without support  Standing: Impaired  Standing - Static: Constant support;Good  Standing - Dynamic : Constant support; Fair    ADL Intervention:       Grooming  Grooming Assistance: Set-up                   Lower Body Dressing Assistance  Dressing Assistance: Minimum assistance  Socks: Minimum assistance(would like to try sock aide next session josé miguel for R LE)  Leg Crossed Method Used: Yes  Position Performed: Seated in chair         Cognitive Retraining  Safety/Judgement: Fall prevention;Decreased insight into deficits; Decreased awareness of need for safety;Decreased awareness of need for assistance        Pain:  Pain only in crossed leg ADL tasks for LE ADLs, R LE \"I had my knee replaced. \"    Activity Tolerance:   Fair; VSS this session    After treatment patient left in no apparent distress:   Sitting in chair, Call bell within reach and Bed / chair alarm activated    COMMUNICATION/COLLABORATION:   The patients plan of care was discussed with: Physical therapist and Registered nurse.      Philly Vallejo OTR/L  Time Calculation: 21 mins

## 2021-04-26 NOTE — PROGRESS NOTES
Orders received, chart reviewed and patient evaluated by physical therapy. Pending progression with skilled acute physical therapy, recommend:  Physical therapy at least 2 days/week in the home     Recommend with nursing OOB to chair 3x/day and walking daily with assist X 1 assist and walker. Thank you for completing as able in order to maintain patient strength, endurance and independence. Full evaluation to follow.  Tessie Ordoñez, PT    Vitals:     04/26/21 4786 04/26/21 0958    BP:  (!) 142/94 (!) 152/46    BP 1 Location:  Left upper arm Left upper arm    BP Patient Position:  Sitting at EOB Sitting post amb    Pulse:   (!) 56    Resp:       Temp:       SpO2:  On room air  99% 95%

## 2021-04-26 NOTE — PROGRESS NOTES
Progress Note    Patient: Sil Aguilera MRN: [id number]  SSN: [id number]    YOB: 1940  Age: 79 y.o. Sex: female      Admit Date: 4/23/2021    LOS: 3 days      Assessment/plan:  1. Acute on chronic systolic heart failure. Patient is diuresing well, and has had urine output of 5.5L documented since admission. Patient is slightly dry with a mild bump in creatinine. Will stop IV bumex and start PO in the morning,  spironolactone 25 mg p.o. daily, nonselective beta-blocker with Coreg 3.125 mg p.o. twice daily. [address] cardiology consultation. ,  She was started on Entresto, April 24, by cardiologist, Dr. Juanjose Castle. 2.  Coronary artery disease. S/p CABG. Continue Plavix, aspirin, Coreg, atorvastatin,  3. Chronic left bundle branch block. Per cardiologist, this is contributing to low LVEF. 4. Parkinson's disease. Continue carbidopa-levodopa. 5.  Hypokalemia. Patient has been started on potassium supplementation with 40 mEq daily X2 doses by Dr Kendrick Valdez. 6.  Chronic kidney disease stage III. Now close to baseline. 7. Anxiety with overwhelming Sx: Currently on Cymbalta. . Ativan added by cards. Palliative saw patient today, will assist with meditation in AM. Will ask Psych about Buspar  8. Debility: PT/OT/Case management consults. Patient states she struggles to live alone. She became very tearful and states she has no family (never , no kids), and makes just too much money to qualify for medicaid. She is worried about living alone, especially since she has quite a bit of difficulty walking.  She extensively talked about how wonderful a time she had at rehab in the past          Current Facility-Administered Medications:     carvediloL (COREG) tablet 12.5 mg, 12.5 mg, Oral, BID WITH MEALS, MD Krissy Martínez.Deck  [START ON 4/27/2021] bumetanide (BUMEX) tablet 2 mg, 2 mg, Oral, DAILY, Holly Vargas MD    LORazepam (ATIVAN) injection 0.5 mg, 0.5 mg, IntraVENous, Q6H PRN, Kendrick Valdez, Norman Dahl MD, 0.5 mg at 04/26/21 1018    sacubitriL-valsartan (ENTRESTO) 24-26 mg tablet 1 Tab, 1 Tab, Oral, Q12H, Darian Delaney MD, 1 Tab at 04/26/21 1008    spironolactone (ALDACTONE) tablet 25 mg, 25 mg, Oral, DAILY, Darian Delaney MD, 25 mg at 04/26/21 1008    albuterol (PROVENTIL VENTOLIN) nebulizer solution 2.5 mg, 2.5 mg, Nebulization, Q4H PRN, Darian Delaney MD    aspirin delayed-release tablet 81 mg, 81 mg, Oral, QHS, Pedro Hahn MD, 81 mg at 04/25/21 2127    atorvastatin (LIPITOR) tablet 40 mg, 40 mg, Oral, QHS, Pedro Hahn MD, 40 mg at 04/25/21 2127    carbidopa-levodopa (SINEMET)  mg per tablet 2 Tab, 2 Tab, Oral, QID, Pedro Hahn MD, 2 Tab at 04/26/21 1154    clopidogreL (PLAVIX) tablet 75 mg, 75 mg, Oral, DAILY AFTER Segundo Yancey MD, 75 mg at 04/26/21 1008    DULoxetine (CYMBALTA) capsule 120 mg, 120 mg, Oral, DAILY, Pedro Hahn MD, 120 mg at 04/26/21 1008    pantoprazole (PROTONIX) tablet 40 mg, 40 mg, Oral, ACB, Pedro Hahn MD, 40 mg at 04/26/21 0634    sodium chloride (NS) flush 5-40 mL, 5-40 mL, IntraVENous, Q8H, Pedro Hahn MD, 10 mL at 04/26/21 0600    sodium chloride (NS) flush 5-40 mL, 5-40 mL, IntraVENous, PRN, Pedro Hahn MD    nitroglycerin (NITROSTAT) tablet 0.4 mg, 0.4 mg, SubLINGual, Q5MIN PRN, Pedro Hahn MD    acetaminophen (TYLENOL) tablet 650 mg, 650 mg, Oral, Q4H PRN, Pedro Hahn MD    heparin (porcine) injection 5,000 Units, 5,000 Units, SubCUTAneous, Q8H, Pedro Hahn MD, 5,000 Units at 04/26/21 1148    Interval summary: Patient was admitted with hypoxia, acute on chronic systolic heart failure on 4/23/2021, and placed on BiPAP at time of admission on 4/23/2021. Late last night, she was taken off BiPAP, then placed on nonrebreather for a few minutes before being placed on 3 L/min oxygen via nasal cannula at 7 PM on 4/23/2021.   This morning, the flow rate was turned down to 1.5 L/min around 8 AM.    Subjective: She states that she is feeling much better, and does not feel particularly short of breath at this time. At time of examination, she is on 1.5 L/min oxygen via nasal cannula. Patient states that she usually does not use supplemental oxygen at the skilled nursing facility at baseline. Patient states that she does not walk very much, because \" there is swelling in her feet\". Patient denies numbness in her feet. Patient states that her ankles swell on and off but she does not always have shortness of breath associated with the ankle swelling. Patient denies chest pain, palpitations, lightheadedness or dizziness. Patient denies coughing, congestion. 4/26/2021: Still anxious. Worried about decreasing ativan. Objective:     Patient Vitals for the past 24 hrs:   BP Temp Pulse Resp SpO2  oxygen therapy   04/24/21 1656 (!) 143/73 98.5 °F (36.9 °C) 61 18 99 %     04/24/21 1600   (!) 59      04/24/21 1200   60      04/24/21 1118 (!) 128/50 97.9 °F (36.6 °C) 62 19 98 %     04/24/21 0817 (!) 171/56 98.6 °F (37 °C) (!) 59 25 100 %  1.5 liters/minute   04/24/21 0800   69      04/24/21 0758     99 %   3 liters per minute   04/24/21 0439     99 %  3 liters per minute   04/24/21 0400 (!) 154/57 97.7 °F (36.5 °C) (!) 52 17 99 %  3 liters per minute   04/23/21 2304 (!) 143/53 97.6 °F (36.4 °C) (!) 52 15 100 %  3 liters per minute   04/23/21 2300     100 %  3 liters per minute   04/23/21 1930         04/23/21 1916     100 %  3 liters per minute   04/23/21 1900 (!) 155/58 98.7 °F (37.1 °C) (!) 59 19 99 %  3 liters per minute   04/23/21 1855 (!) 166/70  (!) 57 19 100 %  nonrebreather     Intake and Output:  Current Shift: No intake/output data recorded. Last three shifts: 04/24 1901 - 04/26 0700  In: 480 [P.O.:480]  Out: 4250 [Urine:4250]    Physical Exam:   Estimated body mass index is 24.35 kg/m² as calculated from the following:    Height as of this encounter: 5' 5\" (1.651 m). Weight as of this encounter: 66.4 kg (146 lb 4.8 oz). General: In no acute distress. Well developed, well nourished. Head: Normocephalic, atraumatic. Eyes: Anicteric sclera. PERRL. Extraocular muscles intact. ENT: External ears and nose appear normal.  Oral mucosa moist.  Neck: Supple. No jugular venous distention. Heart: Regular rate and rhythm. Grade 2/6 systolic ejection murmur. Chest: Symmetrical excursion. Clear to auscultation bilaterally. Abdomen: Soft, nontender. No abnormal distention. Bowel sounds are present throughout. Extremities: No gross deformities. No edema, no cyanosis. Feet are warm to touch. Neurological: Patient is moving all extremities with at least antigravity strength. Alert, oriented X3. Skin: No jaundice. No rashes. Lab/Data Review:  Recent Results (from the past 24 hour(s))   METABOLIC PANEL, COMPREHENSIVE    Collection Time: 04/24/21  4:54 AM   Result Value Ref Range    Sodium 139 136 - 145 mmol/L    Potassium 3.2 (L) 3.5 - 5.1 mmol/L    Chloride 103 97 - 108 mmol/L    CO2 30 21 - 32 mmol/L    Anion gap 6 5 - 15 mmol/L    Glucose 102 (H) 65 - 100 mg/dL    BUN 23 (H) 6 - 20 MG/DL    Creatinine 1.25 (H) 0.55 - 1.02 MG/DL    BUN/Creatinine ratio 18 12 - 20      GFR est AA 50 (L) >60 ml/min/1.73m2    GFR est non-AA 41 (L) >60 ml/min/1.73m2    Calcium 9.3 8.5 - 10.1 MG/DL    Bilirubin, total 1.0 0.2 - 1.0 MG/DL    ALT (SGPT) 7 (L) 12 - 78 U/L    AST (SGOT) 18 15 - 37 U/L    Alk.  phosphatase 88 45 - 117 U/L    Protein, total 6.0 (L) 6.4 - 8.2 g/dL    Albumin 3.1 (L) 3.5 - 5.0 g/dL    Globulin 2.9 2.0 - 4.0 g/dL    A-G Ratio 1.1 1.1 - 2.2     CBC WITH AUTOMATED DIFF    Collection Time: 04/24/21  4:54 AM   Result Value Ref Range    WBC 5.2 3.6 - 11.0 K/uL    RBC 2.61 (L) 3.80 - 5.20 M/uL    HGB 8.0 (L) 11.5 - 16.0 g/dL    HCT 25.6 (L) 35.0 - 47.0 %    MCV 98.1 80.0 - 99.0 FL    MCH 30.7 26.0 - 34.0 PG    MCHC 31.3 30.0 - 36.5 g/dL    RDW 15.2 (H) 11.5 - 14.5 % PLATELET 240 148 - 613 K/uL    MPV 8.9 8.9 - 12.9 FL    NRBC 0.0 0  WBC    ABSOLUTE NRBC 0.00 0.00 - 0.01 K/uL    NEUTROPHILS 66 32 - 75 %    LYMPHOCYTES 16 12 - 49 %    MONOCYTES 12 5 - 13 %    EOSINOPHILS 5 0 - 7 %    BASOPHILS 1 0 - 1 %    IMMATURE GRANULOCYTES 0 0.0 - 0.5 %    ABS. NEUTROPHILS 3.4 1.8 - 8.0 K/UL    ABS. LYMPHOCYTES 0.8 0.8 - 3.5 K/UL    ABS. MONOCYTES 0.6 0.0 - 1.0 K/UL    ABS. EOSINOPHILS 0.2 0.0 - 0.4 K/UL    ABS. BASOPHILS 0.1 0.0 - 0.1 K/UL    ABS. IMM.  GRANS. 0.0 0.00 - 0.04 K/UL    DF AUTOMATED     TROPONIN I    Collection Time: 04/24/21  4:54 AM   Result Value Ref Range    Troponin-I, Qt. 0.05 (H) <0.05 ng/mL   GLUCOSE, POC    Collection Time: 04/24/21  8:45 AM   Result Value Ref Range    Glucose (POC) 111 (H) 65 - 100 mg/dL    Performed by [facility] (CON)    GLUCOSE, POC    Collection Time: 04/24/21 11:16 AM   Result Value Ref Range    Glucose (POC) 176 (H) 65 - 100 mg/dL    Performed by [facility] (CON)    GLUCOSE, POC    Collection Time: 04/24/21  4:54 PM   Result Value Ref Range    Glucose (POC) 120 (H) 65 - 100 mg/dL    Performed by Leonora Vargas        Signed By: Apolonia Garcia MD     April 26, 2021

## 2021-04-26 NOTE — PROGRESS NOTES
04/26/21 0345   CPAP/BIPAP   CPAP/BIPAP Start/Stop Off  (VS stable on NC, Pt denies need, not indicated at this time)

## 2021-04-27 LAB
ALBUMIN SERPL-MCNC: 3.5 G/DL (ref 3.5–5)
ALBUMIN SERPL-MCNC: 3.5 G/DL (ref 3.5–5)
ALBUMIN/GLOB SERPL: 1.1 {RATIO} (ref 1.1–2.2)
ALP SERPL-CCNC: 105 U/L (ref 45–117)
ALT SERPL-CCNC: 10 U/L (ref 12–78)
ANION GAP SERPL CALC-SCNC: 7 MMOL/L (ref 5–15)
ANION GAP SERPL CALC-SCNC: 8 MMOL/L (ref 5–15)
AST SERPL-CCNC: 26 U/L (ref 15–37)
BILIRUB SERPL-MCNC: 0.5 MG/DL (ref 0.2–1)
BUN SERPL-MCNC: 33 MG/DL (ref 6–20)
BUN SERPL-MCNC: 35 MG/DL (ref 6–20)
BUN/CREAT SERPL: 24 (ref 12–20)
BUN/CREAT SERPL: 25 (ref 12–20)
CALCIUM SERPL-MCNC: 9.5 MG/DL (ref 8.5–10.1)
CALCIUM SERPL-MCNC: 9.6 MG/DL (ref 8.5–10.1)
CHLORIDE SERPL-SCNC: 105 MMOL/L (ref 97–108)
CHLORIDE SERPL-SCNC: 106 MMOL/L (ref 97–108)
CO2 SERPL-SCNC: 22 MMOL/L (ref 21–32)
CO2 SERPL-SCNC: 24 MMOL/L (ref 21–32)
CREAT SERPL-MCNC: 1.38 MG/DL (ref 0.55–1.02)
CREAT SERPL-MCNC: 1.38 MG/DL (ref 0.55–1.02)
ECHO AO ROOT DIAM: 2.72 CM
ECHO LV EDV A2C: 77.18 ML
ECHO LV EDV A4C: 48.11 ML
ECHO LV EDV BP: 61.56 ML (ref 56–104)
ECHO LV EDV INDEX A4C: 27.8 ML/M2
ECHO LV EDV INDEX BP: 35.6 ML/M2
ECHO LV EDV NDEX A2C: 44.7 ML/M2
ECHO LV EJECTION FRACTION A2C: 50 PERCENT
ECHO LV EJECTION FRACTION A4C: 26 PERCENT
ECHO LV EJECTION FRACTION BIPLANE: 37.9 PERCENT (ref 55–100)
ECHO LV ESV A2C: 38.67 ML
ECHO LV ESV A4C: 35.61 ML
ECHO LV ESV BP: 38.24 ML (ref 19–49)
ECHO LV ESV INDEX A2C: 22.4 ML/M2
ECHO LV ESV INDEX A4C: 20.6 ML/M2
ECHO LV ESV INDEX BP: 22.1 ML/M2
ECHO LV INTERNAL DIMENSION DIASTOLIC: 4.45 CM (ref 3.9–5.3)
ECHO LV INTERNAL DIMENSION SYSTOLIC: 3.59 CM
ECHO LV IVSD: 1.04 CM (ref 0.6–0.9)
ECHO LV MASS 2D: 170.9 G (ref 67–162)
ECHO LV MASS INDEX 2D: 98.9 G/M2 (ref 43–95)
ECHO LV POSTERIOR WALL DIASTOLIC: 1.15 CM (ref 0.6–0.9)
GLOBULIN SER CALC-MCNC: 3.1 G/DL (ref 2–4)
GLUCOSE SERPL-MCNC: 126 MG/DL (ref 65–100)
GLUCOSE SERPL-MCNC: 129 MG/DL (ref 65–100)
PHOSPHATE SERPL-MCNC: 4.5 MG/DL (ref 2.6–4.7)
POTASSIUM SERPL-SCNC: 4.5 MMOL/L (ref 3.5–5.1)
POTASSIUM SERPL-SCNC: 4.5 MMOL/L (ref 3.5–5.1)
PROT SERPL-MCNC: 6.6 G/DL (ref 6.4–8.2)
SODIUM SERPL-SCNC: 136 MMOL/L (ref 136–145)
SODIUM SERPL-SCNC: 136 MMOL/L (ref 136–145)

## 2021-04-27 PROCEDURE — 74011250637 HC RX REV CODE- 250/637: Performed by: INTERNAL MEDICINE

## 2021-04-27 PROCEDURE — 74011250636 HC RX REV CODE- 250/636: Performed by: FAMILY MEDICINE

## 2021-04-27 PROCEDURE — 80053 COMPREHEN METABOLIC PANEL: CPT

## 2021-04-27 PROCEDURE — 74011250637 HC RX REV CODE- 250/637: Performed by: NURSE PRACTITIONER

## 2021-04-27 PROCEDURE — 65660000001 HC RM ICU INTERMED STEPDOWN

## 2021-04-27 PROCEDURE — 80069 RENAL FUNCTION PANEL: CPT

## 2021-04-27 PROCEDURE — 74011250637 HC RX REV CODE- 250/637: Performed by: FAMILY MEDICINE

## 2021-04-27 PROCEDURE — 74011250636 HC RX REV CODE- 250/636: Performed by: INTERNAL MEDICINE

## 2021-04-27 PROCEDURE — 36415 COLL VENOUS BLD VENIPUNCTURE: CPT

## 2021-04-27 PROCEDURE — 99232 SBSQ HOSP IP/OBS MODERATE 35: CPT | Performed by: INTERNAL MEDICINE

## 2021-04-27 PROCEDURE — 74011250637 HC RX REV CODE- 250/637: Performed by: SPECIALIST

## 2021-04-27 PROCEDURE — 99233 SBSQ HOSP IP/OBS HIGH 50: CPT | Performed by: SPECIALIST

## 2021-04-27 PROCEDURE — 97535 SELF CARE MNGMENT TRAINING: CPT

## 2021-04-27 RX ORDER — BUSPIRONE HYDROCHLORIDE 5 MG/1
5 TABLET ORAL 3 TIMES DAILY
Status: DISCONTINUED | OUTPATIENT
Start: 2021-04-27 | End: 2021-04-30 | Stop reason: HOSPADM

## 2021-04-27 RX ORDER — AMLODIPINE BESYLATE 5 MG/1
10 TABLET ORAL DAILY
Status: DISCONTINUED | OUTPATIENT
Start: 2021-04-28 | End: 2021-04-30 | Stop reason: HOSPADM

## 2021-04-27 RX ORDER — AMLODIPINE BESYLATE 5 MG/1
5 TABLET ORAL DAILY
Status: DISCONTINUED | OUTPATIENT
Start: 2021-04-27 | End: 2021-04-27

## 2021-04-27 RX ORDER — AMLODIPINE BESYLATE 5 MG/1
5 TABLET ORAL
Status: COMPLETED | OUTPATIENT
Start: 2021-04-27 | End: 2021-04-27

## 2021-04-27 RX ORDER — CARVEDILOL 6.25 MG/1
6.25 TABLET ORAL 2 TIMES DAILY WITH MEALS
Status: DISCONTINUED | OUTPATIENT
Start: 2021-04-27 | End: 2021-04-28

## 2021-04-27 RX ADMIN — AMLODIPINE BESYLATE 5 MG: 5 TABLET ORAL at 12:24

## 2021-04-27 RX ADMIN — PANTOPRAZOLE SODIUM 40 MG: 40 TABLET, DELAYED RELEASE ORAL at 06:54

## 2021-04-27 RX ADMIN — BUMETANIDE 2 MG: 1 TABLET ORAL at 09:21

## 2021-04-27 RX ADMIN — ASPIRIN 81 MG: 81 TABLET, COATED ORAL at 20:51

## 2021-04-27 RX ADMIN — HEPARIN SODIUM 5000 UNITS: 5000 INJECTION INTRAVENOUS; SUBCUTANEOUS at 04:11

## 2021-04-27 RX ADMIN — CARBIDOPA AND LEVODOPA 2 TABLET: 25; 100 TABLET ORAL at 09:20

## 2021-04-27 RX ADMIN — HEPARIN SODIUM 5000 UNITS: 5000 INJECTION INTRAVENOUS; SUBCUTANEOUS at 20:49

## 2021-04-27 RX ADMIN — AMLODIPINE BESYLATE 5 MG: 5 TABLET ORAL at 09:20

## 2021-04-27 RX ADMIN — LORAZEPAM 0.5 MG: 2 INJECTION INTRAMUSCULAR; INTRAVENOUS at 03:15

## 2021-04-27 RX ADMIN — SPIRONOLACTONE 25 MG: 25 TABLET ORAL at 09:21

## 2021-04-27 RX ADMIN — DULOXETINE HYDROCHLORIDE 120 MG: 60 CAPSULE, DELAYED RELEASE ORAL at 09:20

## 2021-04-27 RX ADMIN — CARBIDOPA AND LEVODOPA 2 TABLET: 25; 100 TABLET ORAL at 12:24

## 2021-04-27 RX ADMIN — CARVEDILOL 12.5 MG: 12.5 TABLET, FILM COATED ORAL at 09:21

## 2021-04-27 RX ADMIN — SACUBITRIL AND VALSARTAN 1 TABLET: 24; 26 TABLET, FILM COATED ORAL at 09:20

## 2021-04-27 RX ADMIN — BUSPIRONE HYDROCHLORIDE 5 MG: 5 TABLET ORAL at 18:48

## 2021-04-27 RX ADMIN — Medication 10 ML: at 20:51

## 2021-04-27 RX ADMIN — HEPARIN SODIUM 5000 UNITS: 5000 INJECTION INTRAVENOUS; SUBCUTANEOUS at 12:24

## 2021-04-27 RX ADMIN — ATORVASTATIN CALCIUM 40 MG: 40 TABLET, FILM COATED ORAL at 20:50

## 2021-04-27 RX ADMIN — CLOPIDOGREL BISULFATE 75 MG: 75 TABLET ORAL at 09:21

## 2021-04-27 RX ADMIN — Medication 10 ML: at 04:13

## 2021-04-27 RX ADMIN — CARBIDOPA AND LEVODOPA 2 TABLET: 25; 100 TABLET ORAL at 18:47

## 2021-04-27 RX ADMIN — Medication 10 ML: at 18:48

## 2021-04-27 RX ADMIN — SACUBITRIL AND VALSARTAN 1 TABLET: 24; 26 TABLET, FILM COATED ORAL at 20:49

## 2021-04-27 RX ADMIN — BUSPIRONE HYDROCHLORIDE 5 MG: 5 TABLET ORAL at 20:51

## 2021-04-27 RX ADMIN — CARBIDOPA AND LEVODOPA 2 TABLET: 25; 100 TABLET ORAL at 20:51

## 2021-04-27 RX ADMIN — LORAZEPAM 0.25 MG: 2 INJECTION INTRAMUSCULAR; INTRAVENOUS at 11:16

## 2021-04-27 RX ADMIN — CARVEDILOL 6.25 MG: 6.25 TABLET, FILM COATED ORAL at 18:48

## 2021-04-27 NOTE — PROGRESS NOTES
Verbal bedside report given to [facility] OF JOSE RN oncoming nurse by MARCI Breaux RN off-going nurse. Report included current pt status and condition, recent results, hx of present illness, heart rate and rhythm, and respiratory status.

## 2021-04-27 NOTE — PROGRESS NOTES
Bedside shift change report given to Shaniqua (oncoming nurse) by Ora Dill (offgoing nurse). Report included the following information SBAR, Kardex, ED Summary, MAR, Recent Results and Cardiac Rhythm SB/NSR.

## 2021-04-27 NOTE — CARDIO/PULMONARY
Cardiac Rehab; Review of chart for Linda Marinelli based on CR consult for HF reveals she did receive HF education on 4/7/2021. She was deemed not a candidate for OP program due to debility. Wilfredo Jackson RN

## 2021-04-27 NOTE — PROGRESS NOTES
Progress Note    Patient: Sarahi Dumont MRN: [id number]  SSN: [id number]    YOB: 1940  Age: 79 y.o. Sex: female      Admit Date: 4/23/2021    LOS: 4 days        Patient ready to DC, but does not have keys or clothes, which were left at [facility]. Tomorrow, the supervisor of her complex can get her access to her apt/ Case Management is helping. Assessment/plan:  1. Acute on chronic systolic heart failure. Now euvolemic on PO Bumex. Off O2. spironolactone 25 mg p.o. daily, nonselective beta-blocker with Coreg 3.125 mg p.o. twice daily. [address] cardiology consultation. ,  She was started on Entresto, April 24, by cardiologist, Dr. Grupo Williamson. 2.  Coronary artery disease. S/p CABG. Continue Plavix, aspirin, Coreg, atorvastatin,  3. Chronic left bundle branch block. Per cardiologist, this is contributing to low LVEF. 4. Parkinson's disease. Continue carbidopa-levodopa. 5.  Hypokalemia. Patient has been started on potassium supplementation with 40 mEq daily X2 doses by Dr Mishel Flores. 6.  Chronic kidney disease stage III. Now close to baseline. 7. Anxiety with overwhelming Sx: Currently on Cymbalta. . Ativan added by cards, but has been DCd and Buspirone has been started. Palliative saw patient today, will assist with meditation in AM. Will ask Psych about Buspar  8. Debility: PT/OT/Case management consults. Patient does not want to return to [facility], she wants home health.            Current Facility-Administered Medications:     carvediloL (COREG) tablet 6.25 mg, 6.25 mg, Oral, BID WITH MEALS, Mar Charlton MD    [START ON 4/28/2021] amLODIPine (NORVASC) tablet 10 mg, 10 mg, Oral, DAILY, Tory Sue., NP    busPIRone (BUSPAR) tablet 5 mg, 5 mg, Oral, TID, Jeffrey Vargas MD    bumetanide (BUMEX) tablet 2 mg, 2 mg, Oral, DAILY, Jeffrey Vargas MD, 2 mg at 04/27/21 0921    sacubitriL-valsartan (ENTRESTO) 24-26 mg tablet 1 Tab, 1 Tab, Oral, Q12H, Abran Zuniga MD, 1 Tab at 04/27/21 0920    spironolactone (ALDACTONE) tablet 25 mg, 25 mg, Oral, DAILY, Supa Yates MD, 25 mg at 04/27/21 0921    albuterol (PROVENTIL VENTOLIN) nebulizer solution 2.5 mg, 2.5 mg, Nebulization, Q4H PRN, Supa Yates MD    aspirin delayed-release tablet 81 mg, 81 mg, Oral, QHS, Joesph Dockery MD, 81 mg at 04/26/21 2131    atorvastatin (LIPITOR) tablet 40 mg, 40 mg, Oral, QHS, Joesph Dockery MD, 40 mg at 04/26/21 2131    carbidopa-levodopa (SINEMET)  mg per tablet 2 Tab, 2 Tab, Oral, QID, Abhishek Cervantes MD, 2 Tab at 04/27/21 1224    clopidogreL (PLAVIX) tablet 75 mg, 75 mg, Oral, DAILY AFTER BREAKFAST, Abhishek Cervantes MD, 75 mg at 04/27/21 5403    DULoxetine (CYMBALTA) capsule 120 mg, 120 mg, Oral, DAILY, Abhishek Cervantes MD, 120 mg at 04/27/21 0920    pantoprazole (PROTONIX) tablet 40 mg, 40 mg, Oral, ACB, Abhishek Cervantes MD, 40 mg at 04/27/21 0654    sodium chloride (NS) flush 5-40 mL, 5-40 mL, IntraVENous, Q8H, Joesph Dockery MD, 10 mL at 04/27/21 0413    sodium chloride (NS) flush 5-40 mL, 5-40 mL, IntraVENous, PRN, Abhishek Cervantes MD    nitroglycerin (NITROSTAT) tablet 0.4 mg, 0.4 mg, SubLINGual, Q5MIN PRN, Abhishek Cervantes MD    acetaminophen (TYLENOL) tablet 650 mg, 650 mg, Oral, Q4H PRN, Abhishek Cervantes MD    heparin (porcine) injection 5,000 Units, 5,000 Units, SubCUTAneous, Q8H, Abhishek Cervantes MD, 5,000 Units at 04/27/21 1224    Interval summary: Patient was admitted with hypoxia, acute on chronic systolic heart failure on 4/23/2021, and placed on BiPAP at time of admission on 4/23/2021. Late last night, she was taken off BiPAP, then placed on nonrebreather for a few minutes before being placed on 3 L/min oxygen via nasal cannula at 7 PM on 4/23/2021. This morning, the flow rate was turned down to 1.5 L/min around 8 AM.    Subjective:     She states that she is feeling much better, and does not feel particularly short of breath at this time.   At time of examination, she is on 1.5 L/min oxygen via nasal cannula. Patient states that she usually does not use supplemental oxygen at the skilled nursing facility at baseline. Patient states that she does not walk very much, because \" there is swelling in her feet\". Patient denies numbness in her feet. Patient states that her ankles swell on and off but she does not always have shortness of breath associated with the ankle swelling. Patient denies chest pain, palpitations, lightheadedness or dizziness. Patient denies coughing, congestion. 4/26/2021: Still anxious. Worried about decreasing ativan. Objective:     Patient Vitals for the past 24 hrs:   BP Temp Pulse Resp SpO2  oxygen therapy   04/24/21 1656 (!) 143/73 98.5 °F (36.9 °C) 61 18 99 %     04/24/21 1600   (!) 59      04/24/21 1200   60      04/24/21 1118 (!) 128/50 97.9 °F (36.6 °C) 62 19 98 %     04/24/21 0817 (!) 171/56 98.6 °F (37 °C) (!) 59 25 100 %  1.5 liters/minute   04/24/21 0800   69      04/24/21 0758     99 %   3 liters per minute   04/24/21 0439     99 %  3 liters per minute   04/24/21 0400 (!) 154/57 97.7 °F (36.5 °C) (!) 52 17 99 %  3 liters per minute   04/23/21 2304 (!) 143/53 97.6 °F (36.4 °C) (!) 52 15 100 %  3 liters per minute   04/23/21 2300     100 %  3 liters per minute   04/23/21 1930         04/23/21 1916     100 %  3 liters per minute   04/23/21 1900 (!) 155/58 98.7 °F (37.1 °C) (!) 59 19 99 %  3 liters per minute   04/23/21 1855 (!) 166/70  (!) 57 19 100 %  nonrebreather     Intake and Output:  Current Shift: 04/27 0701 - 04/27 1900  In: -   Out: 400 [Urine:400]  Last three shifts: 04/25 1901 - 04/27 0700  In: -   Out: 2000 [Urine:2000]    Physical Exam:   Estimated body mass index is 24.18 kg/m² as calculated from the following:    Height as of this encounter: 5' 5\" (1.651 m). Weight as of this encounter: 65.9 kg (145 lb 4.5 oz). General: In no acute distress.   Well developed, well nourished. Head: Normocephalic, atraumatic. Eyes: Anicteric sclera. PERRL. Extraocular muscles intact. ENT: External ears and nose appear normal.  Oral mucosa moist.  Neck: Supple. No jugular venous distention. Heart: Regular rate and rhythm. Grade 2/6 systolic ejection murmur. Chest: Symmetrical excursion. Clear to auscultation bilaterally. Abdomen: Soft, nontender. No abnormal distention. Bowel sounds are present throughout. Extremities: No gross deformities. No edema, no cyanosis. Feet are warm to touch. Neurological: Patient is moving all extremities with at least antigravity strength. Alert, oriented X3. Skin: No jaundice. No rashes. Lab/Data Review:  Recent Results (from the past 24 hour(s))   METABOLIC PANEL, COMPREHENSIVE    Collection Time: 04/24/21  4:54 AM   Result Value Ref Range    Sodium 139 136 - 145 mmol/L    Potassium 3.2 (L) 3.5 - 5.1 mmol/L    Chloride 103 97 - 108 mmol/L    CO2 30 21 - 32 mmol/L    Anion gap 6 5 - 15 mmol/L    Glucose 102 (H) 65 - 100 mg/dL    BUN 23 (H) 6 - 20 MG/DL    Creatinine 1.25 (H) 0.55 - 1.02 MG/DL    BUN/Creatinine ratio 18 12 - 20      GFR est AA 50 (L) >60 ml/min/1.73m2    GFR est non-AA 41 (L) >60 ml/min/1.73m2    Calcium 9.3 8.5 - 10.1 MG/DL    Bilirubin, total 1.0 0.2 - 1.0 MG/DL    ALT (SGPT) 7 (L) 12 - 78 U/L    AST (SGOT) 18 15 - 37 U/L    Alk.  phosphatase 88 45 - 117 U/L    Protein, total 6.0 (L) 6.4 - 8.2 g/dL    Albumin 3.1 (L) 3.5 - 5.0 g/dL    Globulin 2.9 2.0 - 4.0 g/dL    A-G Ratio 1.1 1.1 - 2.2     CBC WITH AUTOMATED DIFF    Collection Time: 04/24/21  4:54 AM   Result Value Ref Range    WBC 5.2 3.6 - 11.0 K/uL    RBC 2.61 (L) 3.80 - 5.20 M/uL    HGB 8.0 (L) 11.5 - 16.0 g/dL    HCT 25.6 (L) 35.0 - 47.0 %    MCV 98.1 80.0 - 99.0 FL    MCH 30.7 26.0 - 34.0 PG    MCHC 31.3 30.0 - 36.5 g/dL    RDW 15.2 (H) 11.5 - 14.5 %    PLATELET 650 371 - 213 K/uL    MPV 8.9 8.9 - 12.9 FL    NRBC 0.0 0  WBC    ABSOLUTE NRBC 0.00 0.00 - 0.01 K/uL    NEUTROPHILS 66 32 - 75 %    LYMPHOCYTES 16 12 - 49 %    MONOCYTES 12 5 - 13 %    EOSINOPHILS 5 0 - 7 %    BASOPHILS 1 0 - 1 %    IMMATURE GRANULOCYTES 0 0.0 - 0.5 %    ABS. NEUTROPHILS 3.4 1.8 - 8.0 K/UL    ABS. LYMPHOCYTES 0.8 0.8 - 3.5 K/UL    ABS. MONOCYTES 0.6 0.0 - 1.0 K/UL    ABS. EOSINOPHILS 0.2 0.0 - 0.4 K/UL    ABS. BASOPHILS 0.1 0.0 - 0.1 K/UL    ABS. IMM.  GRANS. 0.0 0.00 - 0.04 K/UL    DF AUTOMATED     TROPONIN I    Collection Time: 04/24/21  4:54 AM   Result Value Ref Range    Troponin-I, Qt. 0.05 (H) <0.05 ng/mL   GLUCOSE, POC    Collection Time: 04/24/21  8:45 AM   Result Value Ref Range    Glucose (POC) 111 (H) 65 - 100 mg/dL    Performed by Dave JCAOBSON (CON)    GLUCOSE, POC    Collection Time: 04/24/21 11:16 AM   Result Value Ref Range    Glucose (POC) 176 (H) 65 - 100 mg/dL    Performed by Dave JACOBSON (CON)    GLUCOSE, POC    Collection Time: 04/24/21  4:54 PM   Result Value Ref Range    Glucose (POC) 120 (H) 65 - 100 mg/dL    Performed by Kobi Amaro        Signed By: Candace Dempsey MD     April 27, 2021

## 2021-04-27 NOTE — PROGRESS NOTES
Cardiology Progress Note            Admit Date: 4/23/2021  Admit Diagnosis: SOB (shortness of breath) [R06.02]  Date: 4/27/2021     Time: 2:05 PM    Subjective:   Pt states she is feeling much better than when she came to hospital.  Denies SOB when getting OOB to chair. Assessment and Plan     1. Acute on chronic Heart failure reduced ejection fraction secondary to #1 and #2  2. Coronary disease status post coronary bypass grafting with recent PCI of distal LAD through the LIMA graft. 3.  Mild to moderately reduced LV systolic function  4. At least moderate to severe mitral regurgitation likely functional in nature  5. History of cerebrovascular disease status post carotid endarterectomy and prior CVA  6. Hypertension with intermittent episodes of hypertensive emergency  7. Moderate renal artery disease but not critical enough to require intervention  8. History of paroxysmal atrial fibrillation- remains in NSR. Not on [address] PTA   9. Intermittent left bundle branch block  10. Acute on chronic systolic heart failure secondary to #1 and #9  11. Acute hypoxemic respiratory failure secondary to #1, #4, #10  12. CKD  13. HTN. Plan:Ms. Ramos present to the hospital with acute hypoxemic respiratory failure with sats in 70s due to combination of acute decompensated congestive heart failure, likely hypertensive emergency, functional mitral regurgitation getting worse in the setting of hypertensive emergency.  [facility] has responded to IV diuretics and is now on po diuretics (bumex 2 mg po daily). Repeat limited echo read is pending.- will follow up. Continue Coreg, Entresto 24-26 q 12 hours as well as spironolactone. Continue Plavix, ASA, statin for CAD, recent stents. Increase amlodipine to 10 mg daily for uncontrolled HTN. Not a candidate for CRT at this time as LBBB is intermittent. Pt was seen by Dr. Aneudy Avalos.   Pt does not want us to make her follow up cardiology visit appointment due to need to coordinate with transport company. O    PMH  Past Medical History:   Diagnosis Date    Anxiety disorder     [facility]     CAD (coronary artery disease) 2007    stents, CABG x 3v    Carotid stenosis     Cervical stenosis of spinal canal 2019    Chronic kidney disease     Cough     [facility]A [facility]cere[facility] accident[facility] 2019    left lacunar infarct at head of caudate    Depression     AND CHRONIC ANXIETY    Diabetes (HCC)     GERD (gastroesophageal reflux disease)     High cholesterol     History of peptic ulcer     Bleeding ulcer with increased NSAID use    Hypertension     Left carotid stenosis 2019    s/p left CEA with Dr. Blanche Ardon, old     PUD (peptic ulcer disease)     Stroke ([facility]     Tremor     Valvular heart disease       Social Hx  Social History     Socioeconomic History    Marital status: SINGLE     Spouse name: Not on file    Number of children: Not on file    Years of education: Not on file    Highest education level: Not on file   Occupational History    Occupation: Retired realestate/teacher   Social Needs    Financial resource strain: Not on file    Food insecurity     Worry: Not on file     Inability: Not on file   [employer] needs     Medical: Not on file     Non-medical: Not on file   Tobacco Use    Smoking status: Former Smoker     Packs/day: 0.25     Years: 5.00     Pack years: 1.25     Types: Cigarettes     Quit date: 1969     Years since quittin.3    Smokeless tobacco: Never Used   Substance and Sexual Activity    Alcohol use:  Yes     Alcohol/week: 0.0 standard drinks     Comment: Rare    Drug use: No    Sexual activity: Not on file   Lifestyle    Physical activity     Days per week: Not on file     Minutes per session: Not on file    Stress: Not on file   Relationships    Social connections     Talks on phone: Not on file     Gets together: Not on file     Attends Anabaptism service: Not on file     Active member of club or organization: Not on file     Attends meetings of clubs or organizations: Not on file     Relationship status: Not on file    Intimate partner violence     Fear of current or ex partner: Not on file     Emotionally abused: Not on file     Physically abused: Not on file     Forced sexual activity: Not on file   Other Topics Concern    Not on file   Social History Narrative    L[facility] in [facility]       Objective:      Physical Exam:                Visit Vitals  BP (!) 153/68 (BP 1 Location: Left upper arm, BP Patient Position: At rest;Sitting)   Pulse (!) 53   Temp 97.8 °F (36.6 °C)   Resp 17   Ht 5' 5\" (1.651 m)   Wt 145 lb 4.5 oz (65.9 kg)   SpO2 100%   BMI 24.18 kg/m²          General Appearance:   Well developed, well nourished,alert and oriented x 3, and   individual in no acute distress. Ears/Nose/Mouth/Throat:    Hearing grossly normal.         Neck:  Supple. Chest:    Lungs clear to auscultation bilaterally. Cardiovascular:   Regular rate and rhythm, S1, S2 normal, no murmur. Abdomen:    Soft, non-tender, bowel sounds are active. Extremities:  No edema bilaterally. Skin:  Warm and dry.      Telemetry: NSR          Data Review:    Labs:    Recent Results (from the past 24 hour(s))   ECHO ADULT FOLLOW-UP OR LIMITED    Collection Time: 04/26/21  5:02 PM   Result Value Ref Range    LV Mass .9 67.0 - 162.0 g    LV Mass AL Index 98.9 43.0 - 95.0 g/m2    LVES Vol Index BP 22.1 mL/m2    LVED Vol Index BP 35.6 mL/m2    LVED Vol Index A4C 27.8 mL/m2    LVED Vol Index A2C 44.7 mL/m2    LVES Vol Index A4C 20.6 mL/m2    LVES Vol Index A2C 22.4 mL/m2    IVSd 1.04 (A) 0.60 - 0.90 cm    LVIDd 4.45 3.90 - 5.30 cm    LVIDs 3.59 cm    LVPWd 1.15 (A) 0.60 - 0.90 cm    BP EF 37.9 (A) 55.0 - 100.0 percent    LV Ejection Fraction MOD 2C 50 percent    LV Ejection Fraction MOD 4C 26 percent    LV ED Vol A2C 77.18 mL    LV ED Vol A4C 48.11 mL    LV ED Vol BP 61.56 56.0 - 104.0 mL    LV ES Vol A2C 38.67 mL    LV ES Vol A4C 35.61 mL    LV ES Vol BP 38.24 19.0 - 49.0 mL    Ao Root D 9.54 cm   METABOLIC PANEL, COMPREHENSIVE    Collection Time: 04/27/21  6:06 AM   Result Value Ref Range    Sodium 136 136 - 145 mmol/L    Potassium 4.5 3.5 - 5.1 mmol/L    Chloride 105 97 - 108 mmol/L    CO2 24 21 - 32 mmol/L    Anion gap 7 5 - 15 mmol/L    Glucose 129 (H) 65 - 100 mg/dL    BUN 33 (H) 6 - 20 MG/DL    Creatinine 1.38 (H) 0.55 - 1.02 MG/DL    BUN/Creatinine ratio 24 (H) 12 - 20      GFR est AA 45 (L) >60 ml/min/1.73m2    GFR est non-AA 37 (L) >60 ml/min/1.73m2    Calcium 9.5 8.5 - 10.1 MG/DL    Bilirubin, total 0.5 0.2 - 1.0 MG/DL    ALT (SGPT) 10 (L) 12 - 78 U/L    AST (SGOT) 26 15 - 37 U/L    Alk.  phosphatase 105 45 - 117 U/L    Protein, total 6.6 6.4 - 8.2 g/dL    Albumin 3.5 3.5 - 5.0 g/dL    Globulin 3.1 2.0 - 4.0 g/dL    A-G Ratio 1.1 1.1 - 2.2     RENAL FUNCTION PANEL    Collection Time: 04/27/21  6:06 AM   Result Value Ref Range    Sodium 136 136 - 145 mmol/L    Potassium 4.5 3.5 - 5.1 mmol/L    Chloride 106 97 - 108 mmol/L    CO2 22 21 - 32 mmol/L    Anion gap 8 5 - 15 mmol/L    Glucose 126 (H) 65 - 100 mg/dL    BUN 35 (H) 6 - 20 MG/DL    Creatinine 1.38 (H) 0.55 - 1.02 MG/DL    BUN/Creatinine ratio 25 (H) 12 - 20      GFR est AA 45 (L) >60 ml/min/1.73m2    GFR est non-AA 37 (L) >60 ml/min/1.73m2    Calcium 9.6 8.5 - 10.1 MG/DL    Phosphorus 4.5 2.6 - 4.7 MG/DL    Albumin 3.5 3.5 - 5.0 g/dL          Radiology:        Current Facility-Administered Medications   Medication Dose Route Frequency    carvediloL (COREG) tablet 6.25 mg  6.25 mg Oral BID WITH MEALS    [START ON 4/28/2021] amLODIPine (NORVASC) tablet 10 mg  10 mg Oral DAILY    busPIRone (BUSPAR) tablet 5 mg  5 mg Oral TID    bumetanide (BUMEX) tablet 2 mg  2 mg Oral DAILY    sacubitriL-valsartan (ENTRESTO) 24-26 mg tablet 1 Tab  1 Tab Oral Q12H    spironolactone (ALDACTONE) tablet 25 mg  25 mg Oral DAILY    albuterol (PROVENTIL VENTOLIN) nebulizer solution 2.5 mg  2.5 mg Nebulization Q4H PRN    aspirin delayed-release tablet 81 mg  81 mg Oral QHS    atorvastatin (LIPITOR) tablet 40 mg  40 mg Oral QHS    carbidopa-levodopa (SINEMET)  mg per tablet 2 Tab  2 Tab Oral QID    clopidogreL (PLAVIX) tablet 75 mg  75 mg Oral DAILY AFTER BREAKFAST    DULoxetine (CYMBALTA) capsule 120 mg  120 mg Oral DAILY    pantoprazole (PROTONIX) tablet 40 mg  40 mg Oral ACB    sodium chloride (NS) flush 5-40 mL  5-40 mL IntraVENous Q8H    sodium chloride (NS) flush 5-40 mL  5-40 mL IntraVENous PRN    nitroglycerin (NITROSTAT) tablet 0.4 mg  0.4 mg SubLINGual Q5MIN PRN    acetaminophen (TYLENOL) tablet 650 mg  650 mg Oral Q4H PRN    heparin (porcine) injection 5,000 Units  5,000 Units SubCUTAneous Kim Sue, SUAD   Cardiology Attending:Patient seen and examined. I agree with NP assessment and plans. Anticipate discharge soon, echo pending.     Chava Georges MD 4/27/2021 4:51 PM          [facility] [address] [place] [address] [facility] [address] [facility]   [place]   [phone]

## 2021-04-27 NOTE — CONSULTS
Palliative Medicine Consult  Gary: [phone] ([phone])    Patient Name: Linda Marinelli  YOB: 1940    Date of Initial Consult: 4/26/2021  Reason for Consult: care decisions  Requesting Provider: Dr. Heidi Hernandez  Primary Care Physician: Tino Liriano DO     SUMMARY:   Linda Marinelli is a 79 y.o. with a past history of CAD s/p CABG s/p PCI, paroxysmal afib, intermittent LBBB, hx cerebral vascular disease s/p carotid endarterectomy, CKD3, HTN, moderate renal artery disease, Parkinson's disease, anxiety disorder recent admission 3/29 to 4/9 with ESBL UTI and CHF, who was admitted on 4/23/2021 from home with a diagnosis of Shortness of breath, acute respiratory failure after being home only one day from SNF. Current medical issues leading to Palliative Medicine involvement include: acute respiratory failure due to acute CHF/ hypertensive emergency/ MR, improved with BP optimization and diuresis. Patient was never , no children. She has 5 nieces but is not in close contact with them. MPOA (AMD 2015) is niece, Meeteetse Channel is niece, Khalif Dale. Lives independently at the [facility]. Retired from teaching     PALLIATIVE DIAGNOSES:   1. Shortness of breath  2. Acute on chronic respiratory failure, multifactorial, CHF, improved with diuresis and BP control  3. Debility, fall risk, Parkinson's disease  4. Anxiety disorder, not well controlled on her chronic meds. 5. CKD3  6. Care goals discussion       PLAN:   1. POST and DDNR forms completed today with patient. (see ACP note)  1. She chose limited medical interventions and would accept feeding tube for defined trial of 2 weeks only. 2. Please see also Ryan Lim note for more details. 3. Goals clear to try to return home with support of Home health. 4. Buspar started for better control of anxiety.           GOALS OF CARE / TREATMENT PREFERENCES:     GOALS OF CARE:  Patient/Health Care Proxy Stated Goals: Rehabilitation    TREATMENT PREFERENCES:   Code Status: DNR    Advance Care Planning:  [] [facility] Interdisciplinary Team has updated the ACP Navigator with Health Care Decision Maker and Patient Capacity      Primary Decision Maker: Alfredito Hernandez - Other Relative - [phone]  Advance Care Planning 4/26/2021   Confirm Advance Directive Yes, on file   Patient Would Like to Complete Advance Directive -   Does the patient have other document types -       Medical Interventions: Limited additional interventions     Other Instructions: Other:    As far as possible, the palliative care team has discussed with patient / health care proxy about goals of care / treatment preferences for patient. HISTORY:     History obtained from: chart, patient    CHIEF COMPLAINT: short of breath    HPI/SUBJECTIVE:    The patient is:   [x] Verbal and participatory  [] Non-participatory due to:     79year old female with recent admission 3/29 to 4/9 for CHF and ESBL UTI, discharged to [facility]. She returned home for one day, then became very SOB and called 911  sats were in the 70s when EMS arrived. She has now improved greatly with diuresis and BP control. Still concerned about living alone, hardest thing is affording transportation when she needs a WC. But can't afford [facility], makes too much to get medicaid- tried 3 years ago.     Clinical Pain Assessment (nonverbal scale for severity on nonverbal patients):              Duration: for how long has pt been experiencing pain (e.g., 2 days, 1 month, years)  Frequency: how often pain is an issue (e.g., several times per day, once every few days, constant)     FUNCTIONAL ASSESSMENT:     Palliative Performance Scale (PPS):  PPS: 30       PSYCHOSOCIAL/SPIRITUAL SCREENING:     Palliative IDT has assessed this patient for cultural preferences / practices and a referral made as appropriate to needs (Cultural Services, Patient Advocacy, Ethics, etc.)    Any spiritual / Mandaen concerns:  [] Yes /  [x] No    Caregiver Burnout:  [] Yes /  [x] No /  [] No Caregiver Present      Anticipatory grief assessment:   [x] Normal  / [] Maladaptive       ESAS Anxiety: Anxiety: 5    ESAS Depression:          REVIEW OF SYSTEMS:     Positive and pertinent negative findings in ROS are noted above in HPI. The following systems were [x] reviewed / [] unable to be reviewed as noted in HPI  Other findings are noted below. Systems: constitutional, ears/nose/mouth/throat, respiratory, gastrointestinal, genitourinary, musculoskeletal, integumentary, neurologic, psychiatric, endocrine. Positive findings noted below. Modified ESAS Completed by: provider   Fatigue: 0           Anxiety: 5     Anorexia: 0 Dyspnea: 2     Constipation: No     Stool Occurrence(s): 1        PHYSICAL EXAM:     From RN flowsheet:  Wt Readings from Last 3 Encounters:   04/27/21 65.9 kg (145 lb 4.5 oz)   04/08/21 72.3 kg (159 lb 4.8 oz)   10/04/20 74.6 kg (164 lb 8 oz)     Blood pressure (!) 153/68, pulse (!) 53, temperature 97.8 °F (36.6 °C), resp. rate 17, height 5' 5\" (1.651 m), weight 65.9 kg (145 lb 4.5 oz), SpO2 100 %.     Pain Scale 1: Numeric (0 - 10)  Pain Intensity 1: 0                 Last bowel movement, if known:     Constitutional: awake alert sitting up in chair NAD  Nasal cannula in place  Eyes: pupils equal, anicteric  Affect normal, engaging in conversation  Speech fluent  Insight intact     HISTORY:     Active Problems:    SOB (shortness of breath) (4/23/2021)      Past Medical History:   Diagnosis Date    Anxiety disorder     Atrial fibrillation ([address]     CAD (coronary artery disease) 2007    stents, CABG x 3v    Carotid stenosis     Cervical stenosis of spinal canal 07/2019    Chronic kidney disease     Cough     CVA (cerebral vascular accident) ([address] 07/2019    left lacunar infarct at head of caudate    Depression     AND CHRONIC ANXIETY    Diabetes ([address]     GERD (gastroesophageal reflux disease)     High cholesterol     History of peptic ulcer     Bleeding ulcer with increased NSAID use    Hypertension     Left carotid stenosis 2019    s/p left CEA with Dr. Olga Bolton, old 2007    PUD (peptic ulcer disease)     Stroke ([facility]     Tremor     Valvular heart disease       Past Surgical History:   Procedure Laterality Date    HX CAROTID ENDARTERECTOMY  2019    HX CORONARY ARTERY BYPASS GRAFT      HX TONSILLECTOMY  1963    SC CARDIAC SURG PROCEDURE UNLIST      CABG X3 VESSEL    SC TOTAL KNEE ARTHROPLASTY Right 2015      Family History   Problem Relation Age of Onset    Heart Attack Mother 72        Dec 80yo    Hypertension Mother     Other Father         Unknown    Parkinson's Disease Brother     Anesth Problems Neg Hx       History reviewed, no pertinent family history. Social History     Tobacco Use    Smoking status: Former Smoker     Packs/day: 0.25     Years: 5.00     Pack years: 1.25     Types: Cigarettes     Quit date:      Years since quittin.3    Smokeless tobacco: Never Used   Substance Use Topics    Alcohol use:  Yes     Alcohol/week: 0.0 standard drinks     Comment: Rare     No Known Allergies   Current Facility-Administered Medications   Medication Dose Route Frequency    carvediloL (COREG) tablet 6.25 mg  6.25 mg Oral BID WITH MEALS    [START ON 2021] amLODIPine (NORVASC) tablet 10 mg  10 mg Oral DAILY    busPIRone (BUSPAR) tablet 5 mg  5 mg Oral TID    bumetanide (BUMEX) tablet 2 mg  2 mg Oral DAILY    sacubitriL-valsartan (ENTRESTO) 24-26 mg tablet 1 Tab  1 Tab Oral Q12H    spironolactone (ALDACTONE) tablet 25 mg  25 mg Oral DAILY    albuterol (PROVENTIL VENTOLIN) nebulizer solution 2.5 mg  2.5 mg Nebulization Q4H PRN    aspirin delayed-release tablet 81 mg  81 mg Oral QHS    atorvastatin (LIPITOR) tablet 40 mg  40 mg Oral QHS    carbidopa-levodopa (SINEMET)  mg per tablet 2 Tab  2 Tab Oral QID    clopidogreL (PLAVIX) tablet 75 mg  75 mg Oral DAILY AFTER BREAKFAST    DULoxetine (CYMBALTA) capsule 120 mg  120 mg Oral DAILY    pantoprazole (PROTONIX) tablet 40 mg  40 mg Oral ACB    sodium chloride (NS) flush 5-40 mL  5-40 mL IntraVENous Q8H    sodium chloride (NS) flush 5-40 mL  5-40 mL IntraVENous PRN    nitroglycerin (NITROSTAT) tablet 0.4 mg  0.4 mg SubLINGual Q5MIN PRN    acetaminophen (TYLENOL) tablet 650 mg  650 mg Oral Q4H PRN    heparin (porcine) injection 5,000 Units  5,000 Units SubCUTAneous Q8H          LAB AND IMAGING FINDINGS:     Lab Results   Component Value Date/Time    WBC 7.1 04/25/2021 08:44 AM    HGB 10.0 (L) 04/25/2021 08:44 AM    PLATELET 598 26/29/9394 08:44 AM     Lab Results   Component Value Date/Time    Sodium 136 04/27/2021 06:06 AM    Sodium 136 04/27/2021 06:06 AM    Potassium 4.5 04/27/2021 06:06 AM    Potassium 4.5 04/27/2021 06:06 AM    Chloride 105 04/27/2021 06:06 AM    Chloride 106 04/27/2021 06:06 AM    CO2 24 04/27/2021 06:06 AM    CO2 22 04/27/2021 06:06 AM    BUN 33 (H) 04/27/2021 06:06 AM    BUN 35 (H) 04/27/2021 06:06 AM    Creatinine 1.38 (H) 04/27/2021 06:06 AM    Creatinine 1.38 (H) 04/27/2021 06:06 AM    Calcium 9.5 04/27/2021 06:06 AM    Calcium 9.6 04/27/2021 06:06 AM    Magnesium 1.9 04/09/2021 02:51 AM    Phosphorus 4.5 04/27/2021 06:06 AM      Lab Results   Component Value Date/Time    Alk.  phosphatase 105 04/27/2021 06:06 AM    Protein, total 6.6 04/27/2021 06:06 AM    Albumin 3.5 04/27/2021 06:06 AM    Albumin 3.5 04/27/2021 06:06 AM    Globulin 3.1 04/27/2021 06:06 AM     Lab Results   Component Value Date/Time    INR 1.1 03/18/2020 09:09 AM    Prothrombin time 11.0 03/18/2020 09:09 AM    aPTT 25.6 04/05/2021 07:22 PM      Lab Results   Component Value Date/Time    Iron 55 03/31/2021 03:55 AM    TIBC 280 03/31/2021 03:55 AM    Iron % saturation 20 03/31/2021 03:55 AM    Ferritin 119 03/31/2021 03:55 AM      No results found for: PH, PCO2, PO2  No components found for: [place]   Lab Results   Component Value Date/Time    CK 75 07/11/2019 09:35 AM    CK - MB 2.7 07/11/2019 09:35 AM                Total time: 25min  Counseling / coordination time, spent as noted above: 20  > 50% counseling / coordination?: yes    Prolonged service was provided for  []30 min   []75 min in face to face time in the presence of the patient, spent as noted above. Time Start:   Time End:   Note: this can only be billed with 99401 (initial) or 12109 (follow up). If multiple start / stop times, list each separately.

## 2021-04-27 NOTE — PROGRESS NOTES
Problem: Self Care Deficits Care Plan (Adult)  Goal: *Acute Goals and Plan of Care (Insert Text)  Description:   FUNCTIONAL STATUS PRIOR TO ADMISSION: Pt resides alone in single story apartment, reporting Mod [place] with self-care at W/C level, briefly standing with use of RW; however, progressive difficulty with stand tolerance 2/2 hypersensitivity to BLE. Pt reports she has 6inch lipped step in shower with shower chair. Pt reports she is nervous about being able to care for herself at home, with little to no local support; however, pt reports desires to return home with [facility] therapy. HOME SUPPORT: The patient lived alone with no local support. Occupational Therapy Goals  Initiated 4/25/2021  1. Patient will perform W/C grooming with modified independence within 7 day(s). 2.  Patient will perform EOB bathing with set-up and AE PRN within 7 day(s). 3.  Patient will perform EOB/RW lower body dressing with set-up and AE PRN within 7 day(s). 4.  Patient will perform toilet transfers, using RW, with modified independence within 7 day(s). 5.  Patient will perform all aspects of toileting, using RW, with modified independence within 7 day(s). 6.  Patient will complete EOB to W/C standing pivot turn transfer, using RW, with modified independent within 7 day(s). Outcome: Progressing Towards Goal   OCCUPATIONAL THERAPY TREATMENT  Patient: Willow Sauceda (36 y.o. female)  Date: 4/27/2021  Diagnosis: SOB (shortness of breath) [R06.02] <principal problem not specified>       Precautions: Fall, Contact, DNR, Bed Alarm  Chart, occupational therapy assessment, plan of care, and goals were reviewed. ASSESSMENT  Patient continues with skilled OT services and is progressing towards goals. MoCA administered as patient lives alone and had been inconsistent with cognitive presentation during previous sessions and Nsg had noted the same thing.  Score 24/30, near normal with deficits in [facility] and [facility] [place] attention. Initiated training for compensatory strategies. [place] Cognitive Assessment [facility]):    Patient scored 24/30. The patient scored lower in areas of STM/delayed recall, task attention  Confounding factor(s) include: Bachelors degree ++ college. The [place] Cognitive Assessment [facility]) is designed to assess cognition in areas of visuospatial, executive, naming, memory, attention, language, abstraction, delayed recall, and orientation. Score of greater than or equal to 26/30 is considered normal cognition. Score of less than 26 indicates mild cognitive impairment. Score of 16 or lower indicates severe cognitive impairment. Macie Kerr I., Peter Manzo., TRINY Haley (2005). The [facility] Cognitive Assessment, MoCA: A brief screening tool for mild cognitive impairment. Journal of the Rishi Yoder, 39, 135-850. Current Level of Function Impacting Discharge (ADLs): S bed mobility with bed modified    Other factors to consider for discharge: lives alone         PLAN :  Patient continues to benefit from skilled intervention to address the above impairments. Continue treatment per established plan of care to address goals.     Recommend with staff: out of bed for all meals    Recommend next OT session: w/c use assessment for safe transfers at w/c level which she uses at home    Recommendation for discharge: (in order for the patient to meet his/her long term goals)  Occupational therapy at least 2 days/week in the home AND ensure assist and/or supervision for safety with ADLs and IADLs PRN    This discharge recommendation:  Has been made in collaboration with the attending provider and/or case management    IF patient discharges home will need the following DME: patient owns DME required for discharge       SUBJECTIVE:   Patient stated I thought I would have tested above normal.    OBJECTIVE DATA SUMMARY:   Cognitive/Behavioral Status:  Neurologic State: Alert  Orientation Level: Oriented X4  Cognition: Appropriate decision making; Appropriate for age attention/concentration; Appropriate safety awareness; Follows commands; Impulsive(MoCA 24/30; very min deficits, STM and attention (26/30=norm)  Perception: Appears intact  Perseveration: No perseveration noted  Safety/Judgement: Awareness of environment; Fall prevention; Insight into deficits;Home safety;Good awareness of safety precautions    Functional Mobility and Transfers for ADLs:  Bed Mobility:  Rolling: Stand-by assistance; Additional time;Bed Modified(HOB 45 degrees; sleeps in recliner)  Supine to Sit: Stand-by assistance; Additional time; Adaptive equipment;Bed Modified(sleeps in recliner)  Sit to Supine: Stand-by assistance; Additional time;Bed Modified              Balance:  Sitting: Intact; Without support    ADL Intervention:     Trained adaptations to address decreased STM and attention as these are areas for potential murphy in cognition. Cognitive Retraining  Attention to Task: Single task;Multi-task  Maintains Attention For (Time): Greater than 10 minutes  Following Commands: Follows multi-step simple commands/directions(MoCA 24/30)  Safety/Judgement: Awareness of environment; Fall prevention; Insight into deficits;Home safety;Good awareness of safety precautions        Pain:  3/10 R LE with activity, 1/10 at rest    Activity Tolerance:   Fair, SpO2 stable on RA, and VSS    After treatment patient left in no apparent distress:   Supine in bed, Call bell within reach, and Side rails x 3    COMMUNICATION/COLLABORATION:   The patients plan of care was discussed with: Registered nurse.      Libra Solitario OTR/L  Time Calculation: 34 mins

## 2021-04-27 NOTE — PROGRESS NOTES
TRANSITION OF CARE  RUR--30%  Disposition--Return home to independent functioning. Resume home health services with [facility]. Transport-- Wheelchair Pasqual [facility] with [facility] [phone]. Patient will pay with check as previously with [facility]. Plan for pick 4/28/21 at 11:00AM.    CM was advised that patient will be discharged to home tomorrow. CM notified [facility] tht patient will not be returning. CM made arrangements with Chintan Vick to get her belongings. CM received consult to resume home health services with addition of PT and OT. CM sent referral via [employer] to [employer] [facility], and referral was accepted. CM updated AVS and informed patient. CM spoke with patient regarding wheelchair Pasqual Honour transport to residence. Patient selected [facility] with plan to pay via check as previously with [facility].  CM spoke with [facility], [address] [place] [address], and trip planned for 4/28/21 at 11:00AM.

## 2021-04-28 LAB
ALBUMIN SERPL-MCNC: 3.3 G/DL (ref 3.5–5)
ANION GAP SERPL CALC-SCNC: 5 MMOL/L (ref 5–15)
BACTERIA SPEC CULT: NORMAL
BUN SERPL-MCNC: 38 MG/DL (ref 6–20)
BUN/CREAT SERPL: 28 (ref 12–20)
CALCIUM SERPL-MCNC: 9.2 MG/DL (ref 8.5–10.1)
CHLORIDE SERPL-SCNC: 105 MMOL/L (ref 97–108)
CO2 SERPL-SCNC: 25 MMOL/L (ref 21–32)
CREAT SERPL-MCNC: 1.37 MG/DL (ref 0.55–1.02)
GLUCOSE SERPL-MCNC: 123 MG/DL (ref 65–100)
PHOSPHATE SERPL-MCNC: 4.4 MG/DL (ref 2.6–4.7)
POTASSIUM SERPL-SCNC: 4.8 MMOL/L (ref 3.5–5.1)
SERVICE CMNT-IMP: NORMAL
SODIUM SERPL-SCNC: 135 MMOL/L (ref 136–145)

## 2021-04-28 PROCEDURE — 65660000001 HC RM ICU INTERMED STEPDOWN

## 2021-04-28 PROCEDURE — 80069 RENAL FUNCTION PANEL: CPT

## 2021-04-28 PROCEDURE — 74011250637 HC RX REV CODE- 250/637: Performed by: FAMILY MEDICINE

## 2021-04-28 PROCEDURE — 74011250637 HC RX REV CODE- 250/637: Performed by: INTERNAL MEDICINE

## 2021-04-28 PROCEDURE — 36415 COLL VENOUS BLD VENIPUNCTURE: CPT

## 2021-04-28 PROCEDURE — 97116 GAIT TRAINING THERAPY: CPT

## 2021-04-28 PROCEDURE — 99233 SBSQ HOSP IP/OBS HIGH 50: CPT | Performed by: SPECIALIST

## 2021-04-28 PROCEDURE — 74011250637 HC RX REV CODE- 250/637: Performed by: NURSE PRACTITIONER

## 2021-04-28 PROCEDURE — 97530 THERAPEUTIC ACTIVITIES: CPT

## 2021-04-28 PROCEDURE — 74011250637 HC RX REV CODE- 250/637: Performed by: SPECIALIST

## 2021-04-28 PROCEDURE — 74011250636 HC RX REV CODE- 250/636: Performed by: FAMILY MEDICINE

## 2021-04-28 RX ORDER — CARVEDILOL 3.12 MG/1
3.12 TABLET ORAL 2 TIMES DAILY WITH MEALS
Status: DISCONTINUED | OUTPATIENT
Start: 2021-04-28 | End: 2021-04-29

## 2021-04-28 RX ADMIN — HEPARIN SODIUM 5000 UNITS: 5000 INJECTION INTRAVENOUS; SUBCUTANEOUS at 13:26

## 2021-04-28 RX ADMIN — SACUBITRIL AND VALSARTAN 1 TABLET: 24; 26 TABLET, FILM COATED ORAL at 21:33

## 2021-04-28 RX ADMIN — BUMETANIDE 2 MG: 1 TABLET ORAL at 09:10

## 2021-04-28 RX ADMIN — SPIRONOLACTONE 25 MG: 25 TABLET ORAL at 09:10

## 2021-04-28 RX ADMIN — PANTOPRAZOLE SODIUM 40 MG: 40 TABLET, DELAYED RELEASE ORAL at 06:56

## 2021-04-28 RX ADMIN — ASPIRIN 81 MG: 81 TABLET, COATED ORAL at 21:33

## 2021-04-28 RX ADMIN — ATORVASTATIN CALCIUM 40 MG: 40 TABLET, FILM COATED ORAL at 21:32

## 2021-04-28 RX ADMIN — BUSPIRONE HYDROCHLORIDE 5 MG: 5 TABLET ORAL at 19:45

## 2021-04-28 RX ADMIN — CARBIDOPA AND LEVODOPA 2 TABLET: 25; 100 TABLET ORAL at 09:10

## 2021-04-28 RX ADMIN — CLOPIDOGREL BISULFATE 75 MG: 75 TABLET ORAL at 09:10

## 2021-04-28 RX ADMIN — Medication 10 ML: at 13:26

## 2021-04-28 RX ADMIN — CARBIDOPA AND LEVODOPA 2 TABLET: 25; 100 TABLET ORAL at 19:45

## 2021-04-28 RX ADMIN — HEPARIN SODIUM 5000 UNITS: 5000 INJECTION INTRAVENOUS; SUBCUTANEOUS at 04:34

## 2021-04-28 RX ADMIN — BUSPIRONE HYDROCHLORIDE 5 MG: 5 TABLET ORAL at 21:32

## 2021-04-28 RX ADMIN — CARBIDOPA AND LEVODOPA 2 TABLET: 25; 100 TABLET ORAL at 13:25

## 2021-04-28 RX ADMIN — Medication 10 ML: at 21:33

## 2021-04-28 RX ADMIN — AMLODIPINE BESYLATE 10 MG: 5 TABLET ORAL at 09:10

## 2021-04-28 RX ADMIN — HEPARIN SODIUM 5000 UNITS: 5000 INJECTION INTRAVENOUS; SUBCUTANEOUS at 21:32

## 2021-04-28 RX ADMIN — CARVEDILOL 3.12 MG: 3.12 TABLET, FILM COATED ORAL at 19:45

## 2021-04-28 RX ADMIN — BUSPIRONE HYDROCHLORIDE 5 MG: 5 TABLET ORAL at 09:10

## 2021-04-28 RX ADMIN — SACUBITRIL AND VALSARTAN 1 TABLET: 24; 26 TABLET, FILM COATED ORAL at 09:10

## 2021-04-28 RX ADMIN — CARVEDILOL 6.25 MG: 6.25 TABLET, FILM COATED ORAL at 09:10

## 2021-04-28 RX ADMIN — Medication 10 ML: at 06:57

## 2021-04-28 RX ADMIN — DULOXETINE HYDROCHLORIDE 120 MG: 60 CAPSULE, DELAYED RELEASE ORAL at 09:10

## 2021-04-28 RX ADMIN — CARBIDOPA AND LEVODOPA 2 TABLET: 25; 100 TABLET ORAL at 21:32

## 2021-04-28 NOTE — PROGRESS NOTES
Progress Note    Patient: Florentin Pickering MRN: [id number]  SSN: [id number]    YOB: 1940  Age: 79 y.o. Sex: female      Admit Date: 4/23/2021    LOS: 5 days        Patient ready to DC, but does not have keys or clothes, which were left at [facility]. Tomorrow, the supervisor of her complex can get her access to her apt/ Case Management is helping. Assessment/plan:  1. Acute on chronic systolic heart failure. Now euvolemic on PO Bumex. Off O2. spironolactone 25 mg p.o. daily, nonselective beta-blocker with Coreg 3.125 mg p.o. twice daily. [address] cardiology consultation. ,  She was started on Entresto, April 24, by cardiologist, Dr. Rivka Klinefelter. 2.  Coronary artery disease. S/p CABG. Continue Plavix, aspirin, Coreg, atorvastatin,  3. Chronic left bundle branch block. Per cardiologist, this is contributing to low LVEF. 4. Parkinson's disease. Continue carbidopa-levodopa. 5.  Hypokalemia. Patient has been started on potassium supplementation with 40 mEq daily X2 doses by Dr More Sanders. 6.  Chronic kidney disease stage III. Now close to baseline. 7. Anxiety with overwhelming Sx: Currently on Cymbalta. . Ativan added by cards, but has been DCd and Buspirone has been started. Palliative saw patient. Will ask Psych about Buspar  8. Debility: PT/OT/Case management consults. Patient does not want to return to [facility], she wants home health.            Current Facility-Administered Medications:     carvediloL (COREG) tablet 3.125 mg, 3.125 mg, Oral, BID WITH MEALS, Isaac Sue, NP    amLODIPine (NORVASC) tablet 10 mg, 10 mg, Oral, DAILY, Isaac Sue, SUAD, 10 mg at 04/28/21 0910    busPIRone (BUSPAR) tablet 5 mg, 5 mg, Oral, TID, Aubrey Purdy MD, 5 mg at 04/28/21 0910    bumetanide (BUMEX) tablet 2 mg, 2 mg, Oral, DAILY, Jason Vargas MD, 2 mg at 04/28/21 0910    sacubitriL-valsartan (ENTRESTO) 24-26 mg tablet 1 Tab, 1 Tab, Oral, Q12H, Bernard Hoyt MD, 1 Tab at 04/28/21 0910    albuterol (PROVENTIL VENTOLIN) nebulizer solution 2.5 mg, 2.5 mg, Nebulization, Q4H PRN, Quinton Alvarez MD    aspirin delayed-release tablet 81 mg, 81 mg, Oral, QHS, Joesph Dockery MD, 81 mg at 04/27/21 2051    atorvastatin (LIPITOR) tablet 40 mg, 40 mg, Oral, QHS, Joesph Dockery MD, 40 mg at 04/27/21 2050    carbidopa-levodopa (SINEMET)  mg per tablet 2 Tab, 2 Tab, Oral, QID, Kareem Villareal MD, 2 Tab at 04/28/21 1325    clopidogreL (PLAVIX) tablet 75 mg, 75 mg, Oral, DAILY AFTER BREAKFAST, Kareem Villareal MD, 75 mg at 04/28/21 0910    DULoxetine (CYMBALTA) capsule 120 mg, 120 mg, Oral, DAILY, Kareem Villareal MD, 120 mg at 04/28/21 0910    pantoprazole (PROTONIX) tablet 40 mg, 40 mg, Oral, ACB, Kareem Villareal MD, 40 mg at 04/28/21 0656    sodium chloride (NS) flush 5-40 mL, 5-40 mL, IntraVENous, Q8H, Joesph Dockery MD, 10 mL at 04/28/21 1326    sodium chloride (NS) flush 5-40 mL, 5-40 mL, IntraVENous, PRN, Kareem Villareal MD    nitroglycerin (NITROSTAT) tablet 0.4 mg, 0.4 mg, SubLINGual, Q5MIN PRN, Kareem Villareal MD    acetaminophen (TYLENOL) tablet 650 mg, 650 mg, Oral, Q4H PRN, Kareem Villareal MD    heparin (porcine) injection 5,000 Units, 5,000 Units, SubCUTAneous, Q8H, Kareem Villareal MD, 5,000 Units at 04/28/21 1326    Interval summary: Patient was admitted with hypoxia, acute on chronic systolic heart failure on 4/23/2021, and placed on BiPAP at time of admission on 4/23/2021. Late last night, she was taken off BiPAP, then placed on nonrebreather for a few minutes before being placed on 3 L/min oxygen via nasal cannula at 7 PM on 4/23/2021. This morning, the flow rate was turned down to 1.5 L/min around 8 AM.    Subjective:     She states that she is feeling much better, and does not feel particularly short of breath at this time. At time of examination, she is on 1.5 L/min oxygen via nasal cannula.   Patient states that she usually does not use supplemental oxygen at the skilled nursing facility at baseline. Patient states that she does not walk very much, because \" there is swelling in her feet\". Patient denies numbness in her feet. Patient states that her ankles swell on and off but she does not always have shortness of breath associated with the ankle swelling. Patient denies chest pain, palpitations, lightheadedness or dizziness. Patient denies coughing, congestion. 4/26/2021: Still anxious. Worried about decreasing ativan.    4/28/2021: Feeling well on Buspirone, still has some anxiety. She does not have help getting to apts. Discussed with clinical nursing specialist and seniors helping seniors might be an option for her with her insurance not covering rides. She has [employer], not [employer] and rides are not covered. Will discuss that and other options with patient. Cardiology would like to monitor one more day. Objective:     Patient Vitals for the past 24 hrs:   BP Temp Pulse Resp SpO2  oxygen therapy   04/24/21 1656 (!) 143/73 98.5 °F (36.9 °C) 61 18 99 %     04/24/21 1600   (!) 59      04/24/21 1200   60      04/24/21 1118 (!) 128/50 97.9 °F (36.6 °C) 62 19 98 %     04/24/21 0817 (!) 171/56 98.6 °F (37 °C) (!) 59 25 100 %  1.5 liters/minute   04/24/21 0800   69      04/24/21 0758     99 %   3 liters per minute   04/24/21 0439     99 %  3 liters per minute   04/24/21 0400 (!) 154/57 97.7 °F (36.5 °C) (!) 52 17 99 %  3 liters per minute   04/23/21 2304 (!) 143/53 97.6 °F (36.4 °C) (!) 52 15 100 %  3 liters per minute   04/23/21 2300     100 %  3 liters per minute   04/23/21 1930         04/23/21 1916     100 %  3 liters per minute   04/23/21 1900 (!) 155/58 98.7 °F (37.1 °C) (!) 59 19 99 %  3 liters per minute   04/23/21 1855 (!) 166/70  (!) 57 19 100 %  nonrebreather     Intake and Output:  Current Shift: No intake/output data recorded.   Last three shifts: 04/26 1901 - 04/28 0700  In: -   Out: 1700 [[facility]:9223]    Physical Exam:   Estimated body mass index is 23.7 kg/m² as calculated from the following:    Height as of this encounter: 5' 5\" (1.651 m). Weight as of this encounter: 64.6 kg (142 lb 6.4 oz). General: In no acute distress. Well developed, well nourished. Head: Normocephalic, atraumatic. Eyes: Anicteric sclera. PERRL. Extraocular muscles intact. ENT: External ears and nose appear normal.  Oral mucosa moist.  Neck: Supple. No jugular venous distention. Heart: Regular rate and rhythm. Grade 2/6 systolic ejection murmur. Chest: Symmetrical excursion. Clear to auscultation bilaterally. Abdomen: Soft, nontender. No abnormal distention. Bowel sounds are present throughout. Extremities: No gross deformities. No edema, no cyanosis. Feet are warm to touch. Neurological: Patient is moving all extremities with at least antigravity strength. Alert, oriented X3. Skin: No jaundice. No rashes. Lab/Data Review:  Recent Results (from the past 24 hour(s))   METABOLIC PANEL, COMPREHENSIVE    Collection Time: 04/24/21  4:54 AM   Result Value Ref Range    Sodium 139 136 - 145 mmol/L    Potassium 3.2 (L) 3.5 - 5.1 mmol/L    Chloride 103 97 - 108 mmol/L    CO2 30 21 - 32 mmol/L    Anion gap 6 5 - 15 mmol/L    Glucose 102 (H) 65 - 100 mg/dL    BUN 23 (H) 6 - 20 MG/DL    Creatinine 1.25 (H) 0.55 - 1.02 MG/DL    BUN/Creatinine ratio 18 12 - 20      GFR est AA 50 (L) >60 ml/min/1.73m2    GFR est non-AA 41 (L) >60 ml/min/1.73m2    Calcium 9.3 8.5 - 10.1 MG/DL    Bilirubin, total 1.0 0.2 - 1.0 MG/DL    ALT (SGPT) 7 (L) 12 - 78 U/L    AST (SGOT) 18 15 - 37 U/L    Alk.  phosphatase 88 45 - 117 U/L    Protein, total 6.0 (L) 6.4 - 8.2 g/dL    Albumin 3.1 (L) 3.5 - 5.0 g/dL    Globulin 2.9 2.0 - 4.0 g/dL    A-G Ratio 1.1 1.1 - 2.2     CBC WITH AUTOMATED DIFF    Collection Time: 04/24/21  4:54 AM   Result Value Ref Range    WBC 5.2 3.6 - 11.0 K/uL    RBC 2.61 (L) 3.80 - 5.20 M/uL    HGB 8.0 (L) 11.5 - 16.0 g/dL    HCT 25.6 (L) 35.0 - 47.0 %    MCV 98.1 80.0 - 99.0 FL    MCH 30.7 26.0 - 34.0 PG    MCHC 31.3 30.0 - 36.5 g/dL    RDW 15.2 (H) 11.5 - 14.5 %    PLATELET 688 012 - 039 K/uL    MPV 8.9 8.9 - 12.9 FL    NRBC 0.0 0  WBC    ABSOLUTE NRBC 0.00 0.00 - 0.01 K/uL    NEUTROPHILS 66 32 - 75 %    LYMPHOCYTES 16 12 - 49 %    MONOCYTES 12 5 - 13 %    EOSINOPHILS 5 0 - 7 %    BASOPHILS 1 0 - 1 %    IMMATURE GRANULOCYTES 0 0.0 - 0.5 %    ABS. NEUTROPHILS 3.4 1.8 - 8.0 K/UL    ABS. LYMPHOCYTES 0.8 0.8 - 3.5 K/UL    ABS. MONOCYTES 0.6 0.0 - 1.0 K/UL    ABS. EOSINOPHILS 0.2 0.0 - 0.4 K/UL    ABS. BASOPHILS 0.1 0.0 - 0.1 K/UL    ABS. IMM.  GRANS. 0.0 0.00 - 0.04 K/UL    DF AUTOMATED     TROPONIN I    Collection Time: 04/24/21  4:54 AM   Result Value Ref Range    Troponin-I, Qt. 0.05 (H) <0.05 ng/mL   GLUCOSE, POC    Collection Time: 04/24/21  8:45 AM   Result Value Ref Range    Glucose (POC) 111 (H) 65 - 100 mg/dL    Performed by Pennelope Shingles K (CON)    GLUCOSE, POC    Collection Time: 04/24/21 11:16 AM   Result Value Ref Range    Glucose (POC) 176 (H) 65 - 100 mg/dL    Performed by Pennelope Shingles K (CON)    GLUCOSE, POC    Collection Time: 04/24/21  4:54 PM   Result Value Ref Range    Glucose (POC) 120 (H) 65 - 100 mg/dL    Performed by Lori Slade        Signed By: Yajaira Watson MD     April 28, 2021

## 2021-04-28 NOTE — PROGRESS NOTES
TRANSITION OF CARE  RUR--30%  Disposition--Return home to independent functioning. Resume home health services with [facility]. Transport-- Wheelchair Mirna Decent with [facility] [phone]. Patient will pay via check, as previously with [facility]. To be rescheduled. CM was advised that discharge for today has been deferred. CM cancelled 11:00AM trip with [facility] for wheelchair van. Can be rescheduled when needed. CM received consult for Eliquis discount cards. CM provided patient with :  -- Thirty Day Free Trial Discount Card  -- $10 monthly co-pay for one year Discount Card. CM received consult for assist with transport to outpatient medical appointments. Patient does not have Medicaid which would pay for medical appointment transport. Discussed with patient who understands that she will need to private pay for wheelchair Mirna Decent transport arranged by herself, [facility] is patient's preferred provider, but she has contact info for additional providers, if needed.

## 2021-04-28 NOTE — PROGRESS NOTES
Bedside shift change report given to Shadia (oncoming nurse) by Randolph Kussmaul (offgoing nurse). Report included the following information SBAR, Kardex, ED Summary, MAR, Recent Results and Cardiac Rhythm SB/NSR.

## 2021-04-28 NOTE — PROGRESS NOTES
Problem: Mobility Impaired (Adult and Pediatric)  Goal: *Acute Goals and Plan of Care (Insert Text)  Description: FUNCTIONAL STATUS PRIOR TO ADMISSION: Patient was modified independent using a wheelchair for functional mobility. Recent hospitalization. HOME SUPPORT PRIOR TO ADMISSION: The patient lived alone with no local support. Physical Therapy Goals  Initiated 4/26/2021  1. Patient will move from supine to sit and sit to supine , scoot up and down, and roll side to side in bed with independence within 7 day(s). 2.  Patient will transfer from bed to chair and chair to bed with independence using the least restrictive device within 7 day(s). 3.  Patient will perform sit to stand with independence within 7 day(s). 4.  Patient will ambulate with supervision/set-up for 50 feet with the least restrictive device within 7 day(s). Outcome: Progressing Towards Goal   PHYSICAL THERAPY TREATMENT  Patient: Nehemias Elizalde (40 y.o. female)  Date: 4/28/2021  Diagnosis: SOB (shortness of breath) [R06.02] <principal problem not specified>       Precautions: Fall, Contact, DNR, Bed Alarm  Chart, physical therapy assessment, plan of care and goals were reviewed. ASSESSMENT  Patient continues with skilled PT services and is progressing towards goals. Pt with improved strength, balance, activity tolerance and safety awareness this day. Able to tolerate gait distance of 60ft within room as upon return to EOB pt with urgent need to urinate. Pt's gait speed significantly sped up with need to void and safely manges RW in tight space of the bathroom. Pt does require constant hands on support of RW d/t poor balance without external support. Given pt is largely functional at the w/c level at baseline feel pt is currently appropriate for discharge home with HHPT follow up.      Current Level of Function Impacting Discharge (mobility/balance): CGA-SBA    Other factors to consider for discharge: lives alone, w/c level PTA, from SNF         PLAN :  Patient continues to benefit from skilled intervention to address the above impairments. Continue treatment per established plan of care. to address goals. Recommendation for discharge: (in order for the patient to meet his/her long term goals)  Physical therapy at least 2 days/week in the home     This discharge recommendation:  Has been made in collaboration with the attending provider and/or case management    IF patient discharges home will need the following DME: patient owns DME required for discharge       SUBJECTIVE:   Patient stated You got two walks out of me. It is your caroline day.     OBJECTIVE DATA SUMMARY:   Critical Behavior:  Neurologic State: Alert, Eyes open spontaneously  Orientation Level: Oriented X4  Cognition: Follows commands  Safety/Judgement: Awareness of environment, Fall prevention, Insight into deficits, Home safety, Good awareness of safety precautions  Functional Mobility Training:  Bed Mobility:  Rolling: Stand-by assistance  Supine to Sit: Stand-by assistance  Sit to Supine: Stand-by assistance           Transfers:  Sit to Stand: Contact guard assistance  Stand to Sit: Contact guard assistance        Bed to Chair: Contact guard assistance                    Balance:  Sitting: Intact  Standing: Impaired  Standing - Static: Good;Constant support  Standing - Dynamic : Fair;Constant support  Ambulation/Gait Training:  Distance (ft): 60 Feet (ft)  Assistive Device: Gait belt;Walker, rolling  Ambulation - Level of Assistance: Contact guard assistance;Stand-by assistance        Gait Abnormalities: Decreased step clearance        Base of Support: Widened     Speed/Samira: Pace decreased (<100 feet/min); Slow  Step Length: Right shortened;Left shortened                    Activity Tolerance:   Good and requires rest breaks    After treatment patient left in no apparent distress:   Sitting in chair, Call bell within reach, and Bed / chair alarm activated    COMMUNICATION/COLLABORATION:   The patients plan of care was discussed with: Registered nurse.      Ruperto Louise, PT   Time Calculation: 34 mins

## 2021-04-28 NOTE — ACP (ADVANCE CARE PLANNING)
Palliative Medicine Social Work    This SW and Dr. Marco Antonio Hannah met with patient in room to complete POST and DDNR. POST    Section A: DNR    Section B: LImited ADditional Interventions    Section C: Feeding tube for defined trial period no longer than 2 weeks      DDNR signed as well. Patient has AMD on chart. POST and DDNR on chart to be scanned. Patient has original and copies. Thank you for the opportunity to be involved in the care of Ms. Ramos and her family.     Jasper Chand, MADINA, Supervisee in Social Work  Palliative Medicine   [phone]

## 2021-04-28 NOTE — PROGRESS NOTES
Cardiology Progress Note            Admit Date: 4/23/2021  Admit Diagnosis: SOB (shortness of breath) [R06.02]  Date: 4/28/2021     Time: 2:05 PM    Subjective:  Pt states she wants to go home. Denies chest pain and SOB. Repeat echo with slight improvement in Ef. Assessment and Plan     1. Acute on chronic Heart failure reduced ejection fraction secondary to #1 and #2  2. Coronary disease status post coronary bypass grafting with recent PCI of distal LAD through the LIMA graft. 3.  Mild to moderately reduced LV systolic function  4. At least moderate to severe mitral regurgitation likely functional in nature  5. History of cerebrovascular disease status post carotid endarterectomy and prior CVA  6. Hypertension with intermittent episodes of hypertensive emergency  7. Moderate renal artery disease but not critical enough to require intervention  8. History of paroxysmal atrial fibrillation- remains in NSR. Not on [address] PTA   9. Intermittent left bundle branch block  10. Acute on chronic systolic heart failure secondary to #1 and #9  11. Acute hypoxemic respiratory failure secondary to #1, #4, #10  12. CKD  13. HTN. Plan:Ms. Ramos present to the hospital with acute hypoxemic respiratory failure with sats in 70s due to combination of acute decompensated congestive heart failure, likely hypertensive emergency, functional mitral regurgitation getting worse in the setting of hypertensive emergency.     Repeat echo yesterday with slight increase in EF, now 35-40%. Today, her heart rate is in low to mid 40's on rounds. 1. Will reduce coreg to 3.125 mg BID.  2.Would like to reduce aldactone to 12.5 mg daily but pt unable to cut tabs in 1/2 at home. Thus will stop aldactone due to concern for pt compliance with followup and concern for hyperkalemia in setting of poor monitoring as well as low NL BP this a.m.      3. Continue Entresto 24-26 q 12 hours. 4/ She is on Amlodipine as well for HTN, please continue. 5. Continue Plavix, ASA, statin for CAD, recent stents. Not a candidate for CRT at this time as LBBB is intermittent. 6. Pt does not want us to make her follow up cardiology visit appointment due to need to coordinate with transport company. Pt would ideally benefit from Assisted living and Discussed with Dr. Chong Matthews but pt has refused to consider change of living arrangements despite multiple conversations. Followup has been arranged as below:  Future Appointments   Date Time Provider Abner Mendiola   2021  2:40 PM MD MARISOL Cazares AMB       Cardiac testin21   ECHO ADULT FOLLOW-UP OR LIMITED 2021    Narrative · LV: Estimated LVEF is 35 - 40%. Visually measured ejection fraction. Normal cavity size. Mild concentric hypertrophy. Moderately and globally   reduced systolic function. Left ventricular diastolic dysfunction. · MV: Mitral valve non-specific thickening. Severe mitral annular   calcification.         Signed by: Perla Sebastian MD         [facility]  Past Medical History:   Diagnosis Date    Anxiety disorder     Atrial fibrillation ([address]     CAD (coronary artery disease) 2007    stents, CABG x 3v    Carotid stenosis     Cervical stenosis of spinal canal 2019    Chronic kidney disease     Cough     CVA (cerebral vascular accident) ([address] 2019    left lacunar infarct at head of caudate    Depression     AND CHRONIC ANXIETY    Diabetes ([address]     GERD (gastroesophageal reflux disease)     High cholesterol     History of peptic ulcer     Bleeding ulcer with increased NSAID use    Hypertension     Left carotid stenosis 2019    s/p left CEA with Dr. Yancy Saravia, old 2007    PUD (peptic ulcer disease)     Stroke ([address]     Tremor     Valvular heart disease       Social Hx  Social History     Socioeconomic History    Marital status: SINGLE     Spouse name: Not on file    Number of children: Not on file    Years of education: Not on file    Highest education level: Not on file   Occupational History    Occupation: Retired realestate/teacher   Social Needs    Financial resource strain: Not on file    Food insecurity     Worry: Not on file     Inability: Not on file   [employer] needs     Medical: Not on file     Non-medical: Not on file   Tobacco Use    Smoking status: Former Smoker     Packs/day: 0.25     Years: 5.00     Pack years: 1.25     Types: Cigarettes     Quit date:      Years since quittin.3    Smokeless tobacco: Never Used   Substance and Sexual Activity    Alcohol use: Yes     Alcohol/week: 0.0 standard drinks     Comment: Rare    Drug use: No    Sexual activity: Not on file   Lifestyle    Physical activity     Days per week: Not on file     Minutes per session: Not on file    Stress: Not on file   Relationships    Social connections     Talks on phone: Not on file     Gets together: Not on file     Attends Church service: Not on file     Active member of club or organization: Not on file     Attends meetings of clubs or organizations: Not on file     Relationship status: Not on file    Intimate partner violence     Fear of current or ex partner: Not on file     Emotionally abused: Not on file     Physically abused: Not on file     Forced sexual activity: Not on file   Other Topics Concern    Not on file   Social History Narrative    Lives in [facility]       Objective:      Physical Exam:                Visit Vitals  /65   Pulse (!) 57   Temp 98 °F (36.7 °C)   Resp 20   Ht 5' 5\" (1.651 m)   Wt 142 lb 6.4 oz (64.6 kg)   SpO2 99%   BMI 23.70 kg/m²          General Appearance:   Well developed, well nourished,alert and oriented x 3, and   individual in no acute distress. Ears/Nose/Mouth/Throat:    Hearing grossly normal.         Neck:  Supple.    Chest:    Lungs clear to auscultation bilaterally. Cardiovascular:   Regular rate and rhythm, S1, S2 normal, no murmur. Abdomen:    Soft, non-tender, bowel sounds are active. Extremities:  No edema bilaterally. Skin:  Warm and dry.      Telemetry: NSR          Data Review:    Labs:    Recent Results (from the past 24 hour(s))   RENAL FUNCTION PANEL    Collection Time: 04/28/21  4:28 AM   Result Value Ref Range    Sodium 135 (L) 136 - 145 mmol/L    Potassium 4.8 3.5 - 5.1 mmol/L    Chloride 105 97 - 108 mmol/L    CO2 25 21 - 32 mmol/L    Anion gap 5 5 - 15 mmol/L    Glucose 123 (H) 65 - 100 mg/dL    BUN 38 (H) 6 - 20 MG/DL    Creatinine 1.37 (H) 0.55 - 1.02 MG/DL    BUN/Creatinine ratio 28 (H) 12 - 20      GFR est AA 45 (L) >60 ml/min/1.73m2    GFR est non-AA 37 (L) >60 ml/min/1.73m2    Calcium 9.2 8.5 - 10.1 MG/DL    Phosphorus 4.4 2.6 - 4.7 MG/DL    Albumin 3.3 (L) 3.5 - 5.0 g/dL          Radiology:        Current Facility-Administered Medications   Medication Dose Route Frequency    carvediloL (COREG) tablet 3.125 mg  3.125 mg Oral BID WITH MEALS    amLODIPine (NORVASC) tablet 10 mg  10 mg Oral DAILY    busPIRone (BUSPAR) tablet 5 mg  5 mg Oral TID    bumetanide (BUMEX) tablet 2 mg  2 mg Oral DAILY    sacubitriL-valsartan (ENTRESTO) 24-26 mg tablet 1 Tab  1 Tab Oral Q12H    albuterol (PROVENTIL VENTOLIN) nebulizer solution 2.5 mg  2.5 mg Nebulization Q4H PRN    aspirin delayed-release tablet 81 mg  81 mg Oral QHS    atorvastatin (LIPITOR) tablet 40 mg  40 mg Oral QHS    carbidopa-levodopa (SINEMET)  mg per tablet 2 Tab  2 Tab Oral QID    clopidogreL (PLAVIX) tablet 75 mg  75 mg Oral DAILY AFTER BREAKFAST    DULoxetine (CYMBALTA) capsule 120 mg  120 mg Oral DAILY    pantoprazole (PROTONIX) tablet 40 mg  40 mg Oral ACB    sodium chloride (NS) flush 5-40 mL  5-40 mL IntraVENous Q8H    sodium chloride (NS) flush 5-40 mL  5-40 mL IntraVENous PRN    nitroglycerin (NITROSTAT) tablet 0.4 mg  0.4 mg SubLINGual Q5MIN PRN    acetaminophen (TYLENOL) tablet 650 mg  650 mg Oral Q4H PRN    heparin (porcine) injection 5,000 Units  5,000 Units SubCUTAneous SUAD Moreno. SUAD Sue 4/28/2021 4:51 PM     Cardiology Attending:Patient seen and examined. I agree with NP assessment and plans. Meds being adjusted, anticipate discharge tomorrow.     Mandi Chacko MD 4/28/2021 12:01 PM          [facility] [address] [place] [address], [address] [place] [address],[address] [place] [address]   [phone]

## 2021-04-28 NOTE — PROGRESS NOTES
Problem: Risk for Spread of Infection  Goal: Prevent transmission of infectious organism to others  Description: Prevent the transmission of infectious organisms to other patients, staff members, and visitors. Outcome: Progressing Towards Goal  Note: Pt is on contact for ESBL in urine. Problem: Breathing Pattern - Ineffective  Goal: *Absence of hypoxia  Outcome: Progressing Towards Goal  Note: Pt is currently on room air and sat >95%. Problem: Falls - Risk of  Goal: *Absence of Falls  Description: Document [place] Fall Risk and appropriate interventions in the flowsheet.   Outcome: Progressing Towards Goal  Note: Fall Risk Interventions:  Mobility Interventions: Communicate number of staff needed for ambulation/transfer, Patient to call before getting OOB    Mentation Interventions: Adequate sleep, hydration, pain control, Door open when patient unattended, Update white board, Room close to nurse's station, Reorient patient    Medication Interventions: Teach patient to arise slowly, Patient to call before getting OOB, Evaluate medications/consider consulting pharmacy    Elimination Interventions: Call light in reach, Patient to call for help with toileting needs

## 2021-04-28 NOTE — PROGRESS NOTES
Hospital follow-up PCP transitional care appointment has been scheduled with Dr. Marion Cerrato for Monday, 5/3/21 at 2:15 p.m. Pending patient discharge.   Claude Brizuela, Care Management Specialist.

## 2021-04-29 LAB
ALBUMIN SERPL-MCNC: 3.2 G/DL (ref 3.5–5)
ANION GAP SERPL CALC-SCNC: 6 MMOL/L (ref 5–15)
BUN SERPL-MCNC: 42 MG/DL (ref 6–20)
BUN/CREAT SERPL: 28 (ref 12–20)
CALCIUM SERPL-MCNC: 9.1 MG/DL (ref 8.5–10.1)
CHLORIDE SERPL-SCNC: 103 MMOL/L (ref 97–108)
CO2 SERPL-SCNC: 26 MMOL/L (ref 21–32)
CREAT SERPL-MCNC: 1.5 MG/DL (ref 0.55–1.02)
GLUCOSE SERPL-MCNC: 107 MG/DL (ref 65–100)
PHOSPHATE SERPL-MCNC: 4.2 MG/DL (ref 2.6–4.7)
POTASSIUM SERPL-SCNC: 4.5 MMOL/L (ref 3.5–5.1)
SODIUM SERPL-SCNC: 135 MMOL/L (ref 136–145)

## 2021-04-29 PROCEDURE — 97535 SELF CARE MNGMENT TRAINING: CPT

## 2021-04-29 PROCEDURE — 74011250637 HC RX REV CODE- 250/637: Performed by: FAMILY MEDICINE

## 2021-04-29 PROCEDURE — 99233 SBSQ HOSP IP/OBS HIGH 50: CPT | Performed by: SPECIALIST

## 2021-04-29 PROCEDURE — 74011250637 HC RX REV CODE- 250/637: Performed by: INTERNAL MEDICINE

## 2021-04-29 PROCEDURE — 74011250636 HC RX REV CODE- 250/636: Performed by: FAMILY MEDICINE

## 2021-04-29 PROCEDURE — 80069 RENAL FUNCTION PANEL: CPT

## 2021-04-29 PROCEDURE — 74011250637 HC RX REV CODE- 250/637: Performed by: NURSE PRACTITIONER

## 2021-04-29 PROCEDURE — 65270000032 HC RM SEMIPRIVATE

## 2021-04-29 PROCEDURE — 36415 COLL VENOUS BLD VENIPUNCTURE: CPT

## 2021-04-29 RX ORDER — BUSPIRONE HYDROCHLORIDE 5 MG/1
5 TABLET ORAL 3 TIMES DAILY
Qty: 90 TAB | Refills: 0 | Status: SHIPPED | OUTPATIENT
Start: 2021-04-29 | End: 2022-01-07 | Stop reason: SDUPTHER

## 2021-04-29 RX ORDER — BUMETANIDE 2 MG/1
2 TABLET ORAL DAILY
Qty: 30 TAB | Refills: 0 | Status: SHIPPED | OUTPATIENT
Start: 2021-04-30 | End: 2021-07-16

## 2021-04-29 RX ORDER — LOSARTAN POTASSIUM 50 MG/1
50 TABLET ORAL DAILY
Status: DISCONTINUED | OUTPATIENT
Start: 2021-04-29 | End: 2021-04-30 | Stop reason: HOSPADM

## 2021-04-29 RX ORDER — AMLODIPINE BESYLATE 10 MG/1
10 TABLET ORAL DAILY
Qty: 60 TAB | Refills: 0 | Status: SHIPPED | OUTPATIENT
Start: 2021-04-30 | End: 2021-07-16

## 2021-04-29 RX ORDER — LOSARTAN POTASSIUM 50 MG/1
50 TABLET ORAL DAILY
Qty: 30 TAB | Refills: 0 | Status: SHIPPED | OUTPATIENT
Start: 2021-04-30 | End: 2021-07-16 | Stop reason: SDUPTHER

## 2021-04-29 RX ADMIN — LOSARTAN POTASSIUM 50 MG: 50 TABLET, FILM COATED ORAL at 14:19

## 2021-04-29 RX ADMIN — DULOXETINE HYDROCHLORIDE 120 MG: 60 CAPSULE, DELAYED RELEASE ORAL at 09:39

## 2021-04-29 RX ADMIN — HEPARIN SODIUM 5000 UNITS: 5000 INJECTION INTRAVENOUS; SUBCUTANEOUS at 21:07

## 2021-04-29 RX ADMIN — AMLODIPINE BESYLATE 10 MG: 5 TABLET ORAL at 09:39

## 2021-04-29 RX ADMIN — BUMETANIDE 2 MG: 1 TABLET ORAL at 09:39

## 2021-04-29 RX ADMIN — ATORVASTATIN CALCIUM 40 MG: 40 TABLET, FILM COATED ORAL at 21:07

## 2021-04-29 RX ADMIN — CARBIDOPA AND LEVODOPA 2 TABLET: 25; 100 TABLET ORAL at 09:39

## 2021-04-29 RX ADMIN — CARBIDOPA AND LEVODOPA 2 TABLET: 25; 100 TABLET ORAL at 17:58

## 2021-04-29 RX ADMIN — Medication 10 ML: at 22:00

## 2021-04-29 RX ADMIN — CARVEDILOL 3.12 MG: 3.12 TABLET, FILM COATED ORAL at 09:41

## 2021-04-29 RX ADMIN — HEPARIN SODIUM 5000 UNITS: 5000 INJECTION INTRAVENOUS; SUBCUTANEOUS at 04:09

## 2021-04-29 RX ADMIN — CARBIDOPA AND LEVODOPA 2 TABLET: 25; 100 TABLET ORAL at 14:19

## 2021-04-29 RX ADMIN — BUSPIRONE HYDROCHLORIDE 5 MG: 5 TABLET ORAL at 23:01

## 2021-04-29 RX ADMIN — Medication 10 ML: at 17:59

## 2021-04-29 RX ADMIN — Medication 10 ML: at 06:19

## 2021-04-29 RX ADMIN — ASPIRIN 81 MG: 81 TABLET, COATED ORAL at 23:00

## 2021-04-29 RX ADMIN — BUSPIRONE HYDROCHLORIDE 5 MG: 5 TABLET ORAL at 17:58

## 2021-04-29 RX ADMIN — HEPARIN SODIUM 5000 UNITS: 5000 INJECTION INTRAVENOUS; SUBCUTANEOUS at 14:20

## 2021-04-29 RX ADMIN — BUSPIRONE HYDROCHLORIDE 5 MG: 5 TABLET ORAL at 09:39

## 2021-04-29 RX ADMIN — PANTOPRAZOLE SODIUM 40 MG: 40 TABLET, DELAYED RELEASE ORAL at 06:19

## 2021-04-29 RX ADMIN — SACUBITRIL AND VALSARTAN 1 TABLET: 24; 26 TABLET, FILM COATED ORAL at 09:40

## 2021-04-29 RX ADMIN — CLOPIDOGREL BISULFATE 75 MG: 75 TABLET ORAL at 09:39

## 2021-04-29 RX ADMIN — CARBIDOPA AND LEVODOPA 2 TABLET: 25; 100 TABLET ORAL at 23:00

## 2021-04-29 NOTE — PROGRESS NOTES
Progress Note    Patient: Annamaria Diaz MRN: [id number]  SSN: [id number]    YOB: 1940  Age: 79 y.o. Sex: female      Admit Date: 4/23/2021    LOS: 6 days        Patient ready to DC, but does not have keys or clothes, which were left at [facility]. Tomorrow, the supervisor of her complex can get her access to her apt/ Case Management is helping.    4/29/2021: initial plan was to DC patient yesterday, but HR has dropped into the 40-low 50s, and despite decrease in BB, it is too low to send her home as it does not rise with movement, she is elderly, prone to falls, lives alone. We will DC her beta blocker completely, and watch for increase in HR. Additionally, patient has no phone, no computer, no food, no car, and she cannot drive. She lives alone in an apartment, not and assisted living, and she has no way to get food. She told [employer] [facility] a could of days ago that she was in the hospital, so they said they will resume her food delivery on 5 may, which is a week away. We are continuing to work with case management on ensuring a safe discharge and resumption of care at home. Assessment/plan:  1. Acute on chronic systolic heart failure. Now euvolemic on PO Bumex. Off O2. spironolactone 25 mg p.o. daily, discontinuing BB due to bradycardia. [address] cardiology consultation. ,  She was started on Entresto, April 24, by cardiologist, Dr. Munir Cruz, but cannot afford it and it has been discontinued. 2.  Coronary artery disease. S/p CABG. Continue Plavix, aspirin, Coreg, atorvastatin,  3. Chronic left bundle branch block. Per cardiologist, this is contributing to low LVEF. 4. Parkinson's disease. Continue carbidopa-levodopa. 5.  Hypokalemia. Patient has been started on potassium supplementation with 40 mEq daily X2 doses by Dr Kerry Mcgee. 6.  Chronic kidney disease stage III. Now close to baseline. 7. Anxiety with overwhelming Sx: Currently on Cymbalta. . Ativan added by cards, but has been DCd and Buspirone has been started. Palliative saw patient. Will ask Psych about Buspar  8. Debility: PT/OT/Case management consults. Patient does not want to return to [facility], she wants home health.            Current Facility-Administered Medications:     losartan (COZAAR) tablet 50 mg, 50 mg, Oral, DAILY, Magali Sue, NP    amLODIPine (NORVASC) tablet 10 mg, 10 mg, Oral, DAILY, Toshia Sue, NP, 10 mg at 04/29/21 7326    busPIRone (BUSPAR) tablet 5 mg, 5 mg, Oral, TID, Nick Becker MD, 5 mg at 04/29/21 0939    bumetanide (BUMEX) tablet 2 mg, 2 mg, Oral, DAILY, Nick Becker MD, 2 mg at 04/29/21 0939    albuterol (PROVENTIL VENTOLIN) nebulizer solution 2.5 mg, 2.5 mg, Nebulization, Q4H PRN, Alia Alexandra MD    aspirin delayed-release tablet 81 mg, 81 mg, Oral, QHS, Jem Dockery MD, 81 mg at 04/28/21 2133    atorvastatin (LIPITOR) tablet 40 mg, 40 mg, Oral, QHS, Joesph Dockery MD, 40 mg at 04/28/21 2132    carbidopa-levodopa (SINEMET)  mg per tablet 2 Tab, 2 Tab, Oral, QID, Dawit Candelario MD, 2 Tab at 04/29/21 0939    clopidogreL (PLAVIX) tablet 75 mg, 75 mg, Oral, DAILY AFTER Meghan Damon MD, 75 mg at 04/29/21 0939    DULoxetine (CYMBALTA) capsule 120 mg, 120 mg, Oral, DAILY, Dawit Candelario MD, 120 mg at 04/29/21 0939    pantoprazole (PROTONIX) tablet 40 mg, 40 mg, Oral, ACB, Dawit Candelario MD, 40 mg at 04/29/21 5461    sodium chloride (NS) flush 5-40 mL, 5-40 mL, IntraVENous, Q8H, Joesph Dockery MD, 10 mL at 04/29/21 2123    sodium chloride (NS) flush 5-40 mL, 5-40 mL, IntraVENous, PRN, Dawit Candelario MD    nitroglycerin (NITROSTAT) tablet 0.4 mg, 0.4 mg, SubLINGual, Q5MIN PRN, Dawit Candelario MD    acetaminophen (TYLENOL) tablet 650 mg, 650 mg, Oral, Q4H PRN, Dawit Candelario MD    heparin (porcine) injection 5,000 Units, 5,000 Units, SubCUTAneous, Q8H, Dawit Candelario MD, 5,000 Units at 04/29/21 0409    Interval summary: Patient was admitted with hypoxia, acute on chronic systolic heart failure on 4/23/2021, and placed on BiPAP at time of admission on 4/23/2021. Late last night, she was taken off BiPAP, then placed on nonrebreather for a few minutes before being placed on 3 L/min oxygen via nasal cannula at 7 PM on 4/23/2021. This morning, the flow rate was turned down to 1.5 L/min around 8 AM.    Subjective:     She states that she is feeling much better, and does not feel particularly short of breath at this time. At time of examination, she is on 1.5 L/min oxygen via nasal cannula. Patient states that she usually does not use supplemental oxygen at the skilled nursing facility at baseline. Patient states that she does not walk very much, because \" there is swelling in her feet\". Patient denies numbness in her feet. Patient states that her ankles swell on and off but she does not always have shortness of breath associated with the ankle swelling. Patient denies chest pain, palpitations, lightheadedness or dizziness. Patient denies coughing, congestion. 4/26/2021: Still anxious. Worried about decreasing ativan.    4/28/2021: Feeling well on Buspirone, still has some anxiety. She does not have help getting to apts. Discussed with clinical nursing specialist and seniors helping seniors might be an option for her with her insurance not covering rides. She has [employer], not [employer] and rides are not covered. Will discuss that and other options with patient. Cardiology would like to monitor one more day.      Objective:     Patient Vitals for the past 24 hrs:   BP Temp Pulse Resp SpO2  oxygen therapy   04/24/21 1656 (!) 143/73 98.5 °F (36.9 °C) 61 18 99 %     04/24/21 1600   (!) 59      04/24/21 1200   60      04/24/21 1118 (!) 128/50 97.9 °F (36.6 °C) 62 19 98 %     04/24/21 0817 (!) 171/56 98.6 °F (37 °C) (!) 59 25 100 %  1.5 liters/minute   04/24/21 0800   69      04/24/21 0758     99 %   3 liters per minute   04/24/21 0439     99 %  3 liters per minute   04/24/21 0400 (!) 154/57 97.7 °F (36.5 °C) (!) 52 17 99 %  3 liters per minute   04/23/21 2304 (!) 143/53 97.6 °F (36.4 °C) (!) 52 15 100 %  3 liters per minute   04/23/21 2300     100 %  3 liters per minute   04/23/21 1930         04/23/21 1916     100 %  3 liters per minute   04/23/21 1900 (!) 155/58 98.7 °F (37.1 °C) (!) 59 19 99 %  3 liters per minute   04/23/21 1855 (!) 166/70  (!) 57 19 100 %  nonrebreather     Intake and Output:  Current Shift: No intake/output data recorded. Last three shifts: 04/27 1901 - 04/29 0700  In: 360 [P.O.:360]  Out: 2675 [Urine:2675]    Physical Exam:   Estimated body mass index is 25.29 kg/m² as calculated from the following:    Height as of this encounter: 5' 5\" (1.651 m). Weight as of this encounter: 68.9 kg (152 lb). General: In no acute distress. Well developed, well nourished. Head: Normocephalic, atraumatic. Eyes: Anicteric sclera. PERRL. Extraocular muscles intact. ENT: External ears and nose appear normal.  Oral mucosa moist.  Neck: Supple. No jugular venous distention. Heart: Regular rate and rhythm. Grade 2/6 systolic ejection murmur. Chest: Symmetrical excursion. Clear to auscultation bilaterally. Abdomen: Soft, nontender. No abnormal distention. Bowel sounds are present throughout. Extremities: No gross deformities. No edema, no cyanosis. Feet are warm to touch. Neurological: Patient is moving all extremities with at least antigravity strength. Alert, oriented X3. Skin: No jaundice. No rashes.       Lab/Data Review:  Recent Results (from the past 24 hour(s))   METABOLIC PANEL, COMPREHENSIVE    Collection Time: 04/24/21  4:54 AM   Result Value Ref Range    Sodium 139 136 - 145 mmol/L    Potassium 3.2 (L) 3.5 - 5.1 mmol/L    Chloride 103 97 - 108 mmol/L    CO2 30 21 - 32 mmol/L    Anion gap 6 5 - 15 mmol/L    Glucose 102 (H) 65 - 100 mg/dL    BUN 23 (H) 6 - 20 MG/DL Creatinine 1.25 (H) 0.55 - 1.02 MG/DL    BUN/Creatinine ratio 18 12 - 20      GFR est AA 50 (L) >60 ml/min/1.73m2    GFR est non-AA 41 (L) >60 ml/min/1.73m2    Calcium 9.3 8.5 - 10.1 MG/DL    Bilirubin, total 1.0 0.2 - 1.0 MG/DL    ALT (SGPT) 7 (L) 12 - 78 U/L    AST (SGOT) 18 15 - 37 U/L    Alk. phosphatase 88 45 - 117 U/L    Protein, total 6.0 (L) 6.4 - 8.2 g/dL    Albumin 3.1 (L) 3.5 - 5.0 g/dL    Globulin 2.9 2.0 - 4.0 g/dL    A-G Ratio 1.1 1.1 - 2.2     CBC WITH AUTOMATED DIFF    Collection Time: 04/24/21  4:54 AM   Result Value Ref Range    WBC 5.2 3.6 - 11.0 K/uL    RBC 2.61 (L) 3.80 - 5.20 M/uL    HGB 8.0 (L) 11.5 - 16.0 g/dL    HCT 25.6 (L) 35.0 - 47.0 %    MCV 98.1 80.0 - 99.0 FL    MCH 30.7 26.0 - 34.0 PG    MCHC 31.3 30.0 - 36.5 g/dL    RDW 15.2 (H) 11.5 - 14.5 %    PLATELET 009 394 - 578 K/uL    MPV 8.9 8.9 - 12.9 FL    NRBC 0.0 0  WBC    ABSOLUTE NRBC 0.00 0.00 - 0.01 K/uL    NEUTROPHILS 66 32 - 75 %    LYMPHOCYTES 16 12 - 49 %    MONOCYTES 12 5 - 13 %    EOSINOPHILS 5 0 - 7 %    BASOPHILS 1 0 - 1 %    IMMATURE GRANULOCYTES 0 0.0 - 0.5 %    ABS. NEUTROPHILS 3.4 1.8 - 8.0 K/UL    ABS. LYMPHOCYTES 0.8 0.8 - 3.5 K/UL    ABS. MONOCYTES 0.6 0.0 - 1.0 K/UL    ABS. EOSINOPHILS 0.2 0.0 - 0.4 K/UL    ABS. BASOPHILS 0.1 0.0 - 0.1 K/UL    ABS. IMM.  GRANS. 0.0 0.00 - 0.04 K/UL    DF AUTOMATED     TROPONIN I    Collection Time: 04/24/21  4:54 AM   Result Value Ref Range    Troponin-I, Qt. 0.05 (H) <0.05 ng/mL   GLUCOSE, POC    Collection Time: 04/24/21  8:45 AM   Result Value Ref Range    Glucose (POC) 111 (H) 65 - 100 mg/dL    Performed by Nir JACOBSON (CON)    GLUCOSE, POC    Collection Time: 04/24/21 11:16 AM   Result Value Ref Range    Glucose (POC) 176 (H) 65 - 100 mg/dL    Performed by Nir JACOBSON (CON)    GLUCOSE, POC    Collection Time: 04/24/21  4:54 PM   Result Value Ref Range    Glucose (POC) 120 (H) 65 - 100 mg/dL    Performed by Jacqueline Sharma        Signed By: Andrea Chandler, MD     April 29, 2021

## 2021-04-29 NOTE — DISCHARGE INSTRUCTIONS
Patient Education        Learning About ARBs  Introduction     ARBs (angiotensin II receptor blockers) block a hormone that makes blood vessels narrow. As a result, the blood vessels relax and widen. This lowers blood pressure. ARBs also put more water and salt into the urine. This also lowers blood pressure. ARBs can treat:  · High blood pressure. · Coronary artery disease. · Heart failure. They also may be used to help your kidneys when you have diabetes. Examples  · candesartan (Atacand)  · irbesartan (Avapro)  · losartan (Cozaar)  · olmesartan (Benicar)  · valsartan (Diovan)  This is not a complete list of all ARBs. Possible side effects  Side effects may include:  · Low blood pressure. You may feel dizzy and weak. · High potassium levels. You may have other side effects or reactions not listed here. Check the information that comes with your medicine. What to know about taking this medicine  · ARBs may be used if you had a cough when you tried to take an ACE inhibitor. ARBs are less likely to cause a cough. · You may need regular blood tests. · Take your medicines exactly as prescribed. Call your doctor if you think you are having a problem with your medicine. · Tell your doctor or pharmacist all the medicines you take. This includes over-the-counter medicines, vitamins, herbal products, and supplements. Taking some medicines together can cause problems. · You should not take ARBs if you are pregnant or planning to become pregnant. Where can you learn more? Go to http://www.gray.com/  Enter K212 in the search box to learn more about \"Learning About ARBs. \"  Current as of: August 31, 2020               Content Version: 12.8  © 2642-9489 [employer]. Care instructions adapted under license by [employer] (which disclaims liability or warranty for this information).  If you have questions about a medical condition or this instruction, always ask your healthcare professional. [place] [address] any warranty or liability for your use of this information. Patient Education        Advance Directives: Care Instructions  Overview  An advance directive is a legal way to state your wishes at the end of your life. It tells your family and your doctor what to do if you can't say what you want. There are two main types of advance directives. You can change them any time your wishes change. Living will. This form tells your family and your doctor your wishes about life support and other treatment. The form is also called a declaration. Medical power of . This form lets you name a person to make treatment decisions for you when you can't speak for yourself. This person is called a health care agent (health care proxy, health care surrogate). The form is also called a durable power of  for health care. If you do not have an advance directive, decisions about your medical care may be made by a family member, or by a doctor or a  who doesn't know you. It may help to think of an advance directive as a gift to the people who care for you. If you have one, they won't have to make tough decisions by themselves. Follow-up care is a key part of your treatment and safety. Be sure to make and go to all appointments, and call your doctor if you are having problems. It's also a good idea to know your test results and keep a list of the medicines you take. What should you include in an advance directive? Many states have a unique advance directive form. (It may ask you to address specific issues.) Or you might use a universal form that's approved by many states. If your form doesn't tell you what to address, it may be hard to know what to include in your advance directive. Use the questions below to help you get started. · Who do you want to make decisions about your medical care if you are not able to?   · What life-support measures do you want if you have a serious illness that gets worse over time or can't be cured? · What are you most afraid of that might happen? (Maybe you're afraid of having pain, losing your independence, or being kept alive by machines.)  · Where would you prefer to die? (Your home? A hospital? A nursing home?)  · Do you want to donate your organs when you die? · Do you want certain Moravian practices performed before you die? When should you call for help? Be sure to contact your doctor if you have any questions. Where can you learn more? Go to http://www.gray.com/  Enter R264 in the search box to learn more about \"Advance Directives: Care Instructions. \"  Current as of: July 17, 2020               Content Version: 12.8  © 7003-0846 [employer]. Care instructions adapted under license by [employer] (which disclaims liability or warranty for this information). If you have questions about a medical condition or this instruction, always ask your healthcare professional. [address] any warranty or liability for your use of this information.

## 2021-04-29 NOTE — PROGRESS NOTES
Transition Plan of Care  RUR 30%-High  Disposition plan for discharge to home with home health. Transport with [facility] with a wheelchair van [phone]. She is well known to them as they has provided service to her before. They will come to patient's room.  [facility] [phone]. Ebony Garrett RN CRM  Ext [phone]    CM informed [facility] of patient's discharge this afternoon. Johanna Abrams, M.S.W.  Supervisee

## 2021-04-29 NOTE — PROGRESS NOTES
Cardiology Progress Note            Admit Date: 4/23/2021  Admit Diagnosis: SOB (shortness of breath) [R06.02]  Date: 4/29/2021     Time: 2:05 PM    Subjective: . Denies chest pain and SOB. Hospitalist at bedside. Pt has transportation for follow up visits and unable to afford to pay for transport. There is also concern for ability to pay for Entresto. Creatinine up from yesterday also. Her heart rate is still in the upper 40's to low 50's despite yesterday's reduction in coreg. Assessment and Plan     1. Acute on chronic Heart failure reduced ejection fraction secondary to #1 and #2  2. Coronary disease status post coronary bypass grafting with recent PCI of distal LAD through the LIMA graft. 3.  Mild to moderately reduced LV systolic function  4. At least moderate to severe mitral regurgitation likely functional in nature  5. History of cerebrovascular disease status post carotid endarterectomy and prior CVA  6. Hypertension with intermittent episodes of hypertensive emergency  7. Moderate renal artery disease but not critical enough to require intervention  8. History of paroxysmal atrial fibrillation- remains in NSR. Not on [address] PTA   9. Intermittent left bundle branch block  10. Acute on chronic systolic heart failure secondary to #1 and #9  11. Acute hypoxemic respiratory failure secondary to #1, #4, #10  12. CKD  13. HTN. Plan:Ms. Ramos present to the hospital with acute hypoxemic respiratory failure with sats in 70s due to combination of acute decompensated congestive heart failure, likely hypertensive emergency, functional mitral regurgitation getting worse in the setting of hypertensive emergency. Repeat echo 4/27/21 with slight increase in EF, now 35-40%. Today,   1. her heart rate remains in 40's-low 50.s  Will stop coreg.     2. Will change Entresto to losartan due to concern that she will not be able to afford Entresto. 3. Cardiomyopathy appears compensated. Continue to hold diuretics for now. 4.Continue Plavix, ASA, statin for CAD, recent stents. Not a candidate for CRT at this time as LBBB is intermittent. 5. Unfortunately there is no good plan for follow up. SHe has no transportation for visits and no phone that will allow her to do virtual visits. [facility] inquired into [facility] but per , she will likely not be able to afford and she does not have Medicaid. Will re-ask  if [facility] is an option. 6. Defer discharge today as multiple medication changes today- possible DC tomorrow. Cardiology Attending:Patient seen and examined. I agree with NP assessment and plans. Will stop Coreg for bradycardia. Swati Galaviz MD 2021 4:44 PM       Followup has been arranged as below, if pt able to arrange transportation. Future Appointments   Date Time Provider Abner Mendiola   2021  2:40 PM MD MARISOL Maya BS AMB       Cardiac testin21   ECHO ADULT FOLLOW-UP OR LIMITED 2021    Narrative · LV: Estimated LVEF is 35 - 40%. Visually measured ejection fraction. Normal cavity size. Mild concentric hypertrophy. Moderately and globally   reduced systolic function. Left ventricular diastolic dysfunction. · MV: Mitral valve non-specific thickening. Severe mitral annular   calcification.         Signed by: Wadell Siemens, MD         [facility]  Past Medical History:   Diagnosis Date    Anxiety disorder     Atrial fibrillation ([address]     CAD (coronary artery disease) 2007    stents, CABG x 3v    Carotid stenosis     Cervical stenosis of spinal canal 2019    Chronic kidney disease     Cough     CVA (cerebral vascular accident) ([address] 2019    left lacunar infarct at head of caudate    Depression     AND CHRONIC ANXIETY    Diabetes ([address]     GERD (gastroesophageal reflux disease)     High cholesterol     History of peptic ulcer     Bleeding ulcer with increased NSAID use    Hypertension     Left carotid stenosis 2019    s/p left CEA with Dr. Cary Salazar, old 2007    PUD (peptic ulcer disease)     [facility]     Tremor     Valvular heart disease       Social Hx  Social History     Socioeconomic History    Marital status: SINGLE     Spouse name: Not on file    Number of children: Not on file    Years of education: Not on file    Highest education level: Not on file   Occupational History    Occupation: Retired realestate/teacher   Social Needs    Financial resource strain: Not on file    Food insecurity     Worry: Not on file     Inability: Not on file   [employer] needs     Medical: Not on file     Non-medical: Not on file   Tobacco Use    Smoking status: Former Smoker     Packs/day: 0.25     Years: 5.00     Pack years: 1.25     Types: Cigarettes     Quit date:      Years since quittin.3    Smokeless tobacco: Never Used   Substance and Sexual Activity    Alcohol use:  Yes     Alcohol/week: 0.0 standard drinks     Comment: Rare    Drug use: No    Sexual activity: Not on file   Lifestyle    Physical activity     Days per week: Not on file     Minutes per session: Not on file    Stress: Not on file   Relationships    Social connections     Talks on phone: Not on file     Gets together: Not on file     Attends Gnosticist service: Not on file     Active member of club or organization: Not on file     Attends meetings of clubs or organizations: Not on file     Relationship status: Not on file    Intimate partner violence     Fear of current or ex partner: Not on file     Emotionally abused: Not on file     Physically abused: Not on file     Forced sexual activity: Not on file   Other Topics Concern    Not on file   Social History Narrative    Lives in [facility] alone       Objective:      Physical Exam:                Visit Vitals  /62 (BP 1 Location: Left arm, BP Patient Position: At rest)   Pulse (!) 54   Temp 98.4 °F (36.9 °C)   Resp 18   Ht 5' 5\" (1.651 m)   Wt 152 lb (68.9 kg)   SpO2 99%   BMI 25.29 kg/m²          General Appearance:   Well developed, well nourished,alert and oriented x 3, and   individual in no acute distress. Ears/Nose/Mouth/Throat:    Hearing grossly normal.         Neck:  Supple. Chest:    Lungs clear to auscultation bilaterally. Cardiovascular:   Regular rate and rhythm, S1, S2 normal, no murmur. Abdomen:    Soft, non-tender, bowel sounds are active. Extremities:  No edema bilaterally. Skin:  Warm and dry.      Telemetry: NSR          Data Review:    Labs:    Recent Results (from the past 24 hour(s))   RENAL FUNCTION PANEL    Collection Time: 04/29/21  4:29 AM   Result Value Ref Range    Sodium 135 (L) 136 - 145 mmol/L    Potassium 4.5 3.5 - 5.1 mmol/L    Chloride 103 97 - 108 mmol/L    CO2 26 21 - 32 mmol/L    Anion gap 6 5 - 15 mmol/L    Glucose 107 (H) 65 - 100 mg/dL    BUN 42 (H) 6 - 20 MG/DL    Creatinine 1.50 (H) 0.55 - 1.02 MG/DL    BUN/Creatinine ratio 28 (H) 12 - 20      GFR est AA 41 (L) >60 ml/min/1.73m2    GFR est non-AA 33 (L) >60 ml/min/1.73m2    Calcium 9.1 8.5 - 10.1 MG/DL    Phosphorus 4.2 2.6 - 4.7 MG/DL    Albumin 3.2 (L) 3.5 - 5.0 g/dL          Radiology:        Current Facility-Administered Medications   Medication Dose Route Frequency    losartan (COZAAR) tablet 50 mg  50 mg Oral DAILY    amLODIPine (NORVASC) tablet 10 mg  10 mg Oral DAILY    busPIRone (BUSPAR) tablet 5 mg  5 mg Oral TID    bumetanide (BUMEX) tablet 2 mg  2 mg Oral DAILY    albuterol (PROVENTIL VENTOLIN) nebulizer solution 2.5 mg  2.5 mg Nebulization Q4H PRN    aspirin delayed-release tablet 81 mg  81 mg Oral QHS    atorvastatin (LIPITOR) tablet 40 mg  40 mg Oral QHS    carbidopa-levodopa (SINEMET)  mg per tablet 2 Tab  2 Tab Oral QID    clopidogreL (PLAVIX) tablet 75 mg  75 mg Oral DAILY AFTER BREAKFAST    DULoxetine (CYMBALTA) capsule 120 mg  120 mg Oral DAILY    pantoprazole (PROTONIX) tablet 40 mg  40 mg Oral ACB    sodium chloride (NS) flush 5-40 mL  5-40 mL IntraVENous Q8H    sodium chloride (NS) flush 5-40 mL  5-40 mL IntraVENous PRN    nitroglycerin (NITROSTAT) tablet 0.4 mg  0.4 mg SubLINGual Q5MIN PRN    acetaminophen (TYLENOL) tablet 650 mg  650 mg Oral Q4H PRN    heparin (porcine) injection 5,000 Units  5,000 Units SubCUTAneous SUAD Hoff.  SUAD Sue 4/29/2021 4:51 PM      [facility] [address] [place] [address], [address] [place] [address] [address]   Emilia Head   [phone]

## 2021-04-29 NOTE — PROGRESS NOTES
Received call from Dr. Ramy Chester related to patient remaining in house after discharge was written and transport setup with plan for groceries. [facility] did not show up for transport at , he offers that he received multiple calls about her transport and discharged being delayed. Reviewed all information, [facility] CAN pick her up tonight, ETA [phone]. Discharging tonight although not ideal is our best option, she is medically stable and needs to return to her previous living arrangements. Dr. Ramy Chester reached out to Dr. Bishop Morejon and returned phone call, ALL agreeable to discharge to home with [facility]. Discussed with Shonna/[facility] providing one or two frozen meals.       [facility]/[phone]/SRAVAN Sawyer RN, BSN, [facility]  ED Care Management  [phone]

## 2021-04-29 NOTE — PROGRESS NOTES
200- notified by Shanna Rutherford that patient was supposed to leave hospital with transport at [phone] today. Discussed with DANAE Christensen, and Dr. Artemio Lopez. Called Agib with [facility], per Agib he did not dispatch a  today, discharge rescheduled with [facility] for 10 am tomorrow 4/30. Discussed with Dr. Artemio Lopez and [facility] [place]. Shadia will notify volunteer to be at her house with groceries at that time tomorrow.

## 2021-04-29 NOTE — DISCHARGE SUMMARY
Discharge Summary       PATIENT ID: Mace Gowers  MRN: [id number]   YOB: 1940    DATE OF ADMISSION: 4/23/2021  6:29 AM    DATE OF DISCHARGE: 4/29/2021   PRIMARY CARE PROVIDER: Steph Parker DO     ATTENDING PHYSICIAN: Deandra Dasilva  DISCHARGING PROVIDER: Ariana Otero MD    To contact this individual call [phone] and ask the  to page. If unavailable ask to be transferred the Adult Hospitalist Department. CONSULTATIONS: IP CONSULT TO CARDIOLOGY  IP CONSULT TO CARDIOLOGY  IP CONSULT TO PALLIATIVE CARE - PROVIDER    PROCEDURES/SURGERIES: * No surgery found *    ADMITTING DIAGNOSES & HOSPITAL COURSE:     Acute on Chronic systolic Heart Failure  Coronary Artery Disease  Left Bundle Branch Block  Parkinson's disease  Hypokalemia  CKDIII  Anxiety with Overwhelming Sx  Debility    HPI: A 79year old female patient with PMH of CAD, CHF, Afib not on AC, CKD, HTN, parkinson's presented to ED for evaluation of sob since yesterday. Patient is poor historian and lives in [place]. Pt noticed sob yesterday and she woke up with sob this morning and called 911. She was having worsening sob since 2 weeks with increase in swelling of her legs. She denied chest pain. She does have palpitations. She has been taking her medications regularly. States that she has been not eating well last few days. She denied fever, cough, abd pain, urinary or bowel changes. Per chart review EMS found her in Afib with RVR  In ED, she was in Afib, CXR showed pulmonary edema. One dose of IV labetalol and one dose of IV bumex. Hospitalist consulted for admission.          DISCHARGE DIAGNOSES / PLAN:      1. Acute on chronic systolic heart failure. Now euvolemic on PO Bumex. Off O2. spironolactone 25 mg p.o. daily, discontinuing BB due to bradycardia. Did not tolerate aldactone. She was started on Entresto, April 24, by cardiologist, Dr. Fredrick Gonzalez, but cannot afford it and it has been discontinued.  We appreciate all of cardiology's help    Patient should follow up with cardiology on 6 May at [facility]. Dr. Ewa Sharma office number is [phone] and his office is at [place] [address], [address]      2. Coronary artery disease. S/p CABG. Continue Plavix, aspirin,  Atorvastatin    3. Chronic left bundle branch block. Per cardiologist, this is contributing to low LVEF. 4. Parkinson's disease. Continue carbidopa-levodopa. 5.  Hypokalemia. Resolved    6. Chronic kidney disease stage III. Now close to baseline. 7. Anxiety with overwhelming Sx: Currently on Cymbalta, we have added Buspirone    8. Debility: PT/OT/Case management consults. Patient does not want to return to [facility], she wants home health. She will be followed by home health at [address]:    Follow-up Information     Follow up With Specialties Details Why [facility] services with the addition of home health PT and OT. Service to begin the day after discharge. Please call the agency if no contact by 12 noon the day after discharge. [phone]    Gelacio Aleman MD Cardiology On 5/6/2021 2:40PM [place] [address]  [address] [place] [address]  [phone]      [facility] On 5/3/2021 Hospital follow up PCP appointment Monday, 5/3/21 at 2:15 p.m. with Dr. Anne Peace [address] [place] [address] [id number]               DIET: Cardiac Diet  Oral Nutritional Supplements: Ensure Complete or Ensure PlusOnce daily    ACTIVITY: Activity as tolerated    WOUND CARE: None    EQUIPMENT needed: None      DISCHARGE MEDICATIONS:  Current Discharge Medication List      START taking these medications    Details   amLODIPine (NORVASC) 10 mg tablet Take 1 Tab by mouth daily. Qty: 60 Tab, Refills: 0      busPIRone (BUSPAR) 5 mg tablet Take 1 Tab by mouth three (3) times daily.   Qty: 90 Tab, Refills: 0      losartan (COZAAR) 50 mg tablet Take 1 Tab by mouth daily.  Qty: 30 Tab, Refills: 0         CONTINUE these medications which have CHANGED    Details   bumetanide (BUMEX) 2 mg tablet Take 1 Tab by mouth daily. Qty: 30 Tab, Refills: 0         CONTINUE these medications which have NOT CHANGED    Details   nitroglycerin (Nitrostat) 0.4 mg SL tablet 0.4 mg by SubLINGual route every five (5) minutes as needed for Chest Pain. Up to 3 doses. aspirin delayed-release (Ecotrin Low Strength) 81 mg tablet Take 81 mg by mouth nightly. acetaminophen (TYLENOL) 325 mg tablet Take 650 mg by mouth every six (6) hours as needed for Pain or Fever. pantoprazole (PROTONIX) 40 mg tablet Take 40 mg by mouth Daily (before breakfast). Indications: h/o bleeding ulcer with nsaid      DULoxetine (CYMBALTA) 60 mg capsule Take 120 mg by mouth daily. Indications: Anxiousness associated with Depression               NOTIFY YOUR PHYSICIAN FOR ANY OF THE FOLLOWING:   Fever over 101 degrees for 24 hours. Chest pain, shortness of breath, fever, chills, nausea, vomiting, diarrhea, change in mentation, falling, weakness, bleeding. Severe pain or pain not relieved by medications. Or, any other signs or symptoms that you may have questions about. DISPOSITION:    Home With:   OT  PT  HH  RN       Long term SNF/Inpatient Rehab    Independent/assisted living    Hospice    Other:       PATIENT CONDITION AT DISCHARGE:     Functional status    Poor     Deconditioned     Independent      Cognition     Lucid     Forgetful     Dementia      Catheters/lines (plus indication)    Sue     PICC     PEG     None      Code status     Full code     DNR      PHYSICAL EXAMINATION AT DISCHARGE:  General:          Alert, cooperative, no distress, appears stated age.      HEENT:           Atraumatic, anicteric sclerae, pink conjunctivae                          No oral ulcers, mucosa moist, throat clear, dentition fair  Neck:               Supple, symmetrical  Lungs:             Clear to auscultation bilaterally. No Wheezing or Rhonchi. No rales. Chest wall:      No tenderness  No Accessory muscle use. Heart:              Regular  rhythm,  No  murmur   No edema  Abdomen:        Soft, non-tender. Not distended. Bowel sounds normal  Extremities:     No cyanosis. No clubbing,                            Skin turgor normal, Capillary refill normal  Skin:                Not pale. Not Jaundiced  No rashes   Psych:             Not anxious or agitated. Neurologic:      Alert, moves all extremities, answers questions appropriately and responds to commands       CHRONIC MEDICAL DIAGNOSES:  Problem List as of 4/29/2021 Date Reviewed: 5/1/2020          Codes Class Noted - Resolved    SOB (shortness of breath) ICD-10-CM: R06.02  ICD-9-CM: 786.05  4/23/2021 - Present        UTI (urinary tract infection) ICD-10-CM: N39.0  ICD-9-CM: 599.0  4/9/2021 - Present        CHF exacerbation ([facility] ICD-10-CM: I50.9  ICD-9-CM: 428.0  9/25/2020 - Present        CHF (congestive heart failure) ([facility] ICD-10-CM: I50.9  ICD-9-CM: 428.0  9/22/2020 - Present        Weakness ICD-10-CM: R53.1  ICD-9-CM: 780.79  5/4/2020 - Present        Generalized weakness ICD-10-CM: R53.1  ICD-9-CM: 780.79  4/30/2020 - Present        Carotid artery stenosis, symptomatic, left ICD-10-CM: O35.25  ICD-9-CM: 433.10  7/26/2019 - Present        HTN (hypertension) ICD-10-CM: I10  ICD-9-CM: 401.9  7/17/2019 - Present        Acute ischemic stroke ([facility] ICD-10-CM: I63.9  ICD-9-CM: 434.91  7/12/2019 - Present        Fall ICD-10-CM: Stephanienicolle Walls. Dolph Bitter  ICD-9-CM: E888.9  12/24/2018 - Present        CKD (chronic kidney disease), stage III ([facility] ICD-10-CM: N18.30  ICD-9-CM: 585.3  7/8/2015 - Present        Edema ICD-10-CM: R60.9  ICD-9-CM: 782.3  5/6/2015 - Present        Diabetes mellitus ([facility] ICD-10-CM: E11.9  ICD-9-CM: 250.00  5/6/2015 - Present        Postoperative anemia due to acute blood loss ICD-10-CM: D62  ICD-9-CM: 285.1  2/14/2015 - Present        S/P CABG (coronary artery bypass graft) ICD-10-CM: Z95.1  ICD-9-CM: V45.81  2/13/2015 - Present        Syncope ICD-10-CM: R55  ICD-9-CM: 780.2  2/9/2015 - Present        Bradycardia ICD-10-CM: R00.1  ICD-9-CM: 427.89  2/9/2015 - Present        Acute kidney injury ([facility] ICD-10-CM: N17.9  ICD-9-CM: 584.9  2/9/2015 - Present        Anemia ICD-10-CM: D64.9  ICD-9-CM: 285.9  9/9/2014 - Present        GI bleed ICD-10-CM: K92.2  ICD-9-CM: 578.9  9/9/2014 - Present        AF (atrial fibrillation) ([facility]) ICD-10-CM: I48.91  ICD-9-CM: 427.31  6/20/2014 - Present        Hypotension ICD-10-CM: I95.9  ICD-9-CM: 458.9  6/3/2014 - Present        Coronary artery disease ICD-10-CM: I25.10  ICD-9-CM: 414.00  6/3/2014 - Present    Overview Signed 2/10/2015 11:06 AM by Noemi Juan     2007 PCI x2 to LAD with STEPHAN             S/P coronary artery stent placement ICD-10-CM: Z95.5  ICD-9-CM: V45.82  6/3/2014 - Present        Renal failure ICD-10-CM: N19  ICD-9-CM: 337  6/3/2014 - Present        Elevated troponin ICD-10-CM: R77.8  ICD-9-CM: 790.6  6/3/2014 - Present        Pericardial effusion ICD-10-CM: I31.3  ICD-9-CM: 423.9  6/3/2014 - Present        Lactic acidosis ICD-10-CM: E87.2  ICD-9-CM: 276.2  6/3/2014 - Present              Greater than 150 minutes were spent with the patient on counseling and coordination of care    Signed:   Sidra Lucia MD  4/29/2021  2:55 PM   .

## 2021-04-29 NOTE — PROGRESS NOTES
Problem: Self Care Deficits Care Plan (Adult)  Goal: *Acute Goals and Plan of Care (Insert Text)  Description:   FUNCTIONAL STATUS PRIOR TO ADMISSION: Pt resides alone in single story apartment, reporting Mod [place] with self-care at W/C level, briefly standing with use of RW; however, progressive difficulty with stand tolerance 2/2 hypersensitivity to BLE. Pt reports she has 6inch lipped step in shower with shower chair. Pt reports she is nervous about being able to care for herself at home, with little to no local support; however, pt reports desires to return home with [facility] therapy. HOME SUPPORT: The patient lived alone with no local support. Occupational Therapy Goals  Initiated 4/25/2021  1. Patient will perform W/C grooming with modified independence within 7 day(s). 2.  Patient will perform EOB bathing with set-up and AE PRN within 7 day(s). 3.  Patient will perform EOB/RW lower body dressing with set-up and AE PRN within 7 day(s). 4.  Patient will perform toilet transfers, using RW, with modified independence within 7 day(s). 5.  Patient will perform all aspects of toileting, using RW, with modified independence within 7 day(s). 6.  Patient will complete EOB to W/C standing pivot turn transfer, using RW, with modified independent within 7 day(s). Outcome: Progressing Towards Goal   OCCUPATIONAL THERAPY TREATMENT  Patient: Lisa Marks (88 y.o. female)  Date: 4/29/2021  Diagnosis: SOB (shortness of breath) [R06.02] <principal problem not specified>       Precautions: Fall, Contact, DNR, Bed Alarm  Chart, occupational therapy assessment, plan of care, and goals were reviewed. ASSESSMENT  Patient continues with skilled OT services and is progressing towards goals. Tolerated up OOB to chair, simulated like at home with chair setup like wheelchair for SPT, patient stating she takes her wheelchair into the bathroom for SPT.  Currently SBA however remains with fall risk with fair activity tolerance. HR 50-70s during session. Declined AE for LB ADLs, instructed on seated ADLs and pacing with building strength and endurance with ADL participation. Recommend HH OT due to patient declining SNF. Current Level of Function Impacting Discharge (ADLs): SBA for SPT from bed to chair simulating wc at home, mod  for LB ADLs patient do wear slippers and disposable brief to limit minimal bending    Other factors to consider for discharge: lives alone, wheelchair at home uses feet to propel         PLAN :  Patient continues to benefit from skilled intervention to address the above impairments. Continue treatment per established plan of care to address goals. Recommend with staff: Cammy Mcelroy for all meals    Recommend next OT session: standing ADLs/RW, LB dressing retraining declines AE    Recommendation for discharge: (in order for the patient to meet his/her long term goals)  Occupational therapy at least 2 days/week in the home     This discharge recommendation:  Has been made in collaboration with the attending provider and/or case management    IF patient discharges home will need the following DME: AE: long handled bathing and AE: long handled dressing- declines       SUBJECTIVE:   Patient stated I just stand and get to the toilet like this.     OBJECTIVE DATA SUMMARY:   Cognitive/Behavioral Status:         alert             Functional Mobility and Transfers for ADLs:  Bed Mobility:  Supine to Sit: Stand-by assistance    Transfers:  Sit to Stand: Stand-by assistance     Bed to Chair: Stand-by assistance(simulated like wheelchair)    Balance:       ADL Intervention:  Feeding  Feeding Assistance: Set-up          Patient instructed and indicated understanding the benefits of maintaining activity tolerance, functional mobility, and independence with self care tasks during acute stay  to ensure safe return home and to baseline.  Encouraged patient to increase frequency and duration OOB, be out of bed for all meals, perform daily ADLs (as approved by RN/MD regarding bathing etc), and performing functional mobility to/from bathroom. LB dressing retraining. Educated on activity modification, building strength and endurance with ADLs and fall prevention. Patient educated on use of adaptive equipment (ie. Reacher, sock aid, long shoe horn,) in order to complete LB dressing. Patient instructed on technique to complete. Patient indicated understanding/recalled strategies with verbal cues. Declined need and use at home, not interested in purchasing. Lower Body Dressing Assistance  Socks: Moderate assistance  Leg Crossed Method Used: Yes  Position Performed: Seated in chair  Cues: Don;Visual/perceptual training/retraining  Adaptive Equipment Used: Sock aid; Long handled shoe horn;Reacher(educated and demonstrated, declined)    Toileting  Bladder Hygiene: Set-up  Clothing Management: Minimum assistance         Therapeutic Exercises:       Pain:  None noted    Activity Tolerance:   Fair and requires rest breaks; HR 50-70s with activity    After treatment patient left in no apparent distress:   Sitting in chair, Call bell within reach, and Bed / chair alarm activated    COMMUNICATION/COLLABORATION:   The patients plan of care was discussed with: Physical therapist and Registered nurse.      Margo Andrew OT  Time Calculation: 29 mins

## 2021-04-30 VITALS
OXYGEN SATURATION: 100 % | TEMPERATURE: 97.8 F | SYSTOLIC BLOOD PRESSURE: 131 MMHG | WEIGHT: 145.8 LBS | HEART RATE: 52 BPM | DIASTOLIC BLOOD PRESSURE: 56 MMHG | BODY MASS INDEX: 24.29 KG/M2 | HEIGHT: 65 IN | RESPIRATION RATE: 18 BRPM

## 2021-04-30 LAB
ALBUMIN SERPL-MCNC: 3.5 G/DL (ref 3.5–5)
ANION GAP SERPL CALC-SCNC: 7 MMOL/L (ref 5–15)
BUN SERPL-MCNC: 44 MG/DL (ref 6–20)
BUN/CREAT SERPL: 31 (ref 12–20)
CALCIUM SERPL-MCNC: 9.4 MG/DL (ref 8.5–10.1)
CHLORIDE SERPL-SCNC: 104 MMOL/L (ref 97–108)
CO2 SERPL-SCNC: 25 MMOL/L (ref 21–32)
CREAT SERPL-MCNC: 1.4 MG/DL (ref 0.55–1.02)
GLUCOSE SERPL-MCNC: 137 MG/DL (ref 65–100)
PHOSPHATE SERPL-MCNC: 3.9 MG/DL (ref 2.6–4.7)
POTASSIUM SERPL-SCNC: 4.3 MMOL/L (ref 3.5–5.1)
SODIUM SERPL-SCNC: 136 MMOL/L (ref 136–145)

## 2021-04-30 PROCEDURE — 80069 RENAL FUNCTION PANEL: CPT

## 2021-04-30 PROCEDURE — 74011250637 HC RX REV CODE- 250/637: Performed by: NURSE PRACTITIONER

## 2021-04-30 PROCEDURE — 74011250637 HC RX REV CODE- 250/637: Performed by: INTERNAL MEDICINE

## 2021-04-30 PROCEDURE — 74011250636 HC RX REV CODE- 250/636: Performed by: FAMILY MEDICINE

## 2021-04-30 PROCEDURE — 36415 COLL VENOUS BLD VENIPUNCTURE: CPT

## 2021-04-30 PROCEDURE — 99233 SBSQ HOSP IP/OBS HIGH 50: CPT | Performed by: SPECIALIST

## 2021-04-30 PROCEDURE — 74011250637 HC RX REV CODE- 250/637: Performed by: FAMILY MEDICINE

## 2021-04-30 RX ADMIN — CARBIDOPA AND LEVODOPA 2 TABLET: 25; 100 TABLET ORAL at 08:32

## 2021-04-30 RX ADMIN — HEPARIN SODIUM 5000 UNITS: 5000 INJECTION INTRAVENOUS; SUBCUTANEOUS at 05:12

## 2021-04-30 RX ADMIN — Medication 10 ML: at 06:00

## 2021-04-30 RX ADMIN — CLOPIDOGREL BISULFATE 75 MG: 75 TABLET ORAL at 08:32

## 2021-04-30 RX ADMIN — BUMETANIDE 2 MG: 1 TABLET ORAL at 08:32

## 2021-04-30 RX ADMIN — BUSPIRONE HYDROCHLORIDE 5 MG: 5 TABLET ORAL at 08:33

## 2021-04-30 RX ADMIN — DULOXETINE HYDROCHLORIDE 120 MG: 60 CAPSULE, DELAYED RELEASE ORAL at 08:32

## 2021-04-30 RX ADMIN — AMLODIPINE BESYLATE 10 MG: 5 TABLET ORAL at 08:32

## 2021-04-30 RX ADMIN — LOSARTAN POTASSIUM 50 MG: 50 TABLET, FILM COATED ORAL at 08:32

## 2021-04-30 RX ADMIN — PANTOPRAZOLE SODIUM 40 MG: 40 TABLET, DELAYED RELEASE ORAL at 08:32

## 2021-04-30 NOTE — DISCHARGE SUMMARY
Discharge Summary       PATIENT ID: Marilynn Angulo  MRN: [id number]   YOB: 1940    DATE OF ADMISSION: 4/23/2021  6:29 AM    DATE OF DISCHARGE: 4/29/2021   PRIMARY CARE PROVIDER: Vi Guzman DO     ATTENDING PHYSICIAN: Cayla May  DISCHARGING PROVIDER: Sean Martinez MD    To contact this individual call [phone] and ask the  to page. If unavailable ask to be transferred the Adult Hospitalist Department. CONSULTATIONS: IP CONSULT TO CARDIOLOGY  IP CONSULT TO CARDIOLOGY  IP CONSULT TO PALLIATIVE CARE - PROVIDER    PROCEDURES/SURGERIES: * No surgery found *    ADMITTING DIAGNOSES & HOSPITAL COURSE:     Acute on Chronic systolic Heart Failure - NYHA Class II  Coronary Artery Disease  Left Bundle Branch Block  Parkinson's disease  Hypokalemia  CKDIII  Anxiety with Overwhelming Sx  Debility    HPI: A 79year old female patient with PMH of CAD, CHF, Afib not on AC, CKD, HTN, parkinson's presented to ED for evaluation of sob since yesterday. Patient is poor historian and lives in [place]. Pt noticed sob yesterday and she woke up with sob this morning and called 911. She was having worsening sob since 2 weeks with increase in swelling of her legs. She denied chest pain. She does have palpitations. She has been taking her medications regularly. States that she has been not eating well last few days. She denied fever, cough, abd pain, urinary or bowel changes. Per chart review EMS found her in Afib with RVR  In ED, she was in Afib, CXR showed pulmonary edema. One dose of IV labetalol and one dose of IV bumex. Hospitalist consulted for admission.          DISCHARGE DIAGNOSES / PLAN:      1. Acute on chronic systolic heart failure. Now euvolemic on PO Bumex. Off O2. spironolactone 25 mg p.o. daily, discontinuing BB due to bradycardia. Did not tolerate aldactone. She was started on Entresto, April 24, by cardiologist, Dr. Rojas Fernandes, but cannot afford it and it has been discontinued.  We appreciate all of cardiology's help    Patient should follow up with cardiology on 6 May at [facility]. Dr. Madiha Luis office number is [phone] and his office is at [place] [address], [address]      2. Coronary artery disease. S/p CABG. Continue Plavix, aspirin,  Atorvastatin    3. Chronic left bundle branch block. Per cardiologist, this is contributing to low LVEF. 4. Parkinson's disease. Continue carbidopa-levodopa. 5.  Hypokalemia. Resolved    6. Chronic kidney disease stage III. Now close to baseline. 7. Anxiety with overwhelming Sx: Currently on Cymbalta, we have added Buspirone    8. Debility: PT/OT/Case management consults. Patient does not want to return to [facility], she wants home health. She will be followed by home health at [address] [place] [address] from [place] will be meeting you at your home to provide groceries until [employer] Whe[employer] starts back up next Friday. Social work has been able to get all your belongings (purse, phone, clothes, ) from [facility]. If you start having any symptoms like dizziness, lightheadedness, difficulty breathing, palpitations or fatigue, please call 911 and return to the ER. FOLLOW UP APPOINTMENTS:    Follow-up Information     Follow up With Specialties Details Why [address] [facility] home health services with the addition of home health PT and OT. Service to begin the day after discharge. Please call the agency if no contact by 12 noon the day after discharge.  [phone]    Ezra Sorto MD Cardiology On 5/6/2021 2:40PM [place] [address]  [address] [place] [id number]  [phone]      Byron Armas DO Family [facility] On 5/3/2021 Hospital follow up PCP appointment Monday, 5/3/21 at 2:15 p.m. with Dr. Trisha Fleischer [address] [place] [address]               DIET: Cardiac Diet  Oral Nutritional Supplements: Ensure Complete or Ensure PlusOnce daily    ACTIVITY: Activity as tolerated    WOUND CARE: None    EQUIPMENT needed: None      DISCHARGE MEDICATIONS:  Current Discharge Medication List      START taking these medications    Details   amLODIPine (NORVASC) 10 mg tablet Take 1 Tab by mouth daily. Qty: 60 Tab, Refills: 0      busPIRone (BUSPAR) 5 mg tablet Take 1 Tab by mouth three (3) times daily. Qty: 90 Tab, Refills: 0      losartan (COZAAR) 50 mg tablet Take 1 Tab by mouth daily. Qty: 30 Tab, Refills: 0         CONTINUE these medications which have CHANGED    Details   bumetanide (BUMEX) 2 mg tablet Take 1 Tab by mouth daily. Qty: 30 Tab, Refills: 0         CONTINUE these medications which have NOT CHANGED    Details   nitroglycerin (Nitrostat) 0.4 mg SL tablet 0.4 mg by SubLINGual route every five (5) minutes as needed for Chest Pain. Up to 3 doses. aspirin delayed-release (Ecotrin Low Strength) 81 mg tablet Take 81 mg by mouth nightly. acetaminophen (TYLENOL) 325 mg tablet Take 650 mg by mouth every six (6) hours as needed for Pain or Fever. pantoprazole (PROTONIX) 40 mg tablet Take 40 mg by mouth Daily (before breakfast). Indications: h/o bleeding ulcer with nsaid      DULoxetine (CYMBALTA) 60 mg capsule Take 120 mg by mouth daily. Indications: Anxiousness associated with Depression               NOTIFY YOUR PHYSICIAN FOR ANY OF THE FOLLOWING:   Fever over 101 degrees for 24 hours. Chest pain, shortness of breath, fever, chills, nausea, vomiting, diarrhea, change in mentation, falling, weakness, bleeding. Severe pain or pain not relieved by medications. Or, any other signs or symptoms that you may have questions about.     DISPOSITION:    Home With:   OT  PT  HH  RN       Long term SNF/Inpatient Rehab    Independent/assisted living    Hospice    Other:       PATIENT CONDITION AT DISCHARGE:     Functional status    Poor     Deconditioned     Independent      Cognition     Lucid     Forgetful     Dementia      Catheters/lines (plus indication)    Sue PICC     PEG     None      Code status     Full code     DNR      PHYSICAL EXAMINATION AT DISCHARGE:  General:          Alert, cooperative, no distress, appears stated age. HEENT:           Atraumatic, anicteric sclerae, pink conjunctivae                          No oral ulcers, mucosa moist, throat clear, dentition fair  Neck:               Supple, symmetrical  Lungs:             Clear to auscultation bilaterally. No Wheezing or Rhonchi. No rales. Chest wall:      No tenderness  No Accessory muscle use. Heart:              Regular  rhythm,  No  murmur   No edema  Abdomen:        Soft, non-tender. Not distended. Bowel sounds normal  Extremities:     No cyanosis. No clubbing,                            Skin turgor normal, Capillary refill normal  Skin:                Not pale. Not Jaundiced  No rashes   Psych:             Not anxious or agitated. Neurologic:      Alert, moves all extremities, answers questions appropriately and responds to commands       CHRONIC MEDICAL DIAGNOSES:  Problem List as of 4/30/2021 Date Reviewed: 5/1/2020          Codes Class Noted - Resolved    SOB (shortness of breath) ICD-10-CM: R06.02  ICD-9-CM: 786.05  4/23/2021 - Present        UTI (urinary tract infection) ICD-10-CM: N39.0  ICD-9-CM: 599.0  4/9/2021 - Present        CHF exacerbation ([facility] ICD-10-CM: I50.9  ICD-9-CM: 428.0  9/25/2020 - Present        CHF (congestive heart failure) ([facility] ICD-10-CM: I50.9  ICD-9-CM: 428.0  9/22/2020 - Present        Weakness ICD-10-CM: R53.1  ICD-9-CM: 780.79  5/4/2020 - Present        Generalized weakness ICD-10-CM: R53.1  ICD-9-CM: 780.79  4/30/2020 - Present        Carotid artery stenosis, symptomatic, left ICD-10-CM: W76.26  ICD-9-CM: 433.10  7/26/2019 - Present        HTN (hypertension) ICD-10-CM: I10  ICD-9-CM: 401.9  7/17/2019 - Present        Acute ischemic stroke ([facility] ICD-10-CM: I63.9  ICD-9-CM: 434.91  7/12/2019 - Present        Fall ICD-10-CM: Zuri Saleh. Sandeep Curiel  ICD-9-CM: B255.7 12/24/2018 - Present        CKD (chronic kidney disease), stage III ([facility]) ICD-10-CM: N18.30  ICD-9-CM: 585.3  7/8/2015 - Present        Edema ICD-10-CM: R60.9  ICD-9-CM: 782.3  5/6/2015 - Present        Diabetes mellitus ([facility] ICD-10-CM: E11.9  ICD-9-CM: 250.00  5/6/2015 - Present        Postoperative anemia due to acute blood loss ICD-10-CM: D62  ICD-9-CM: 285.1  2/14/2015 - Present        S/P CABG (coronary artery bypass graft) ICD-10-CM: Z95.1  ICD-9-CM: V45.81  2/13/2015 - Present        Syncope ICD-10-CM: R55  ICD-9-CM: 780.2  2/9/2015 - Present        Bradycardia ICD-10-CM: R00.1  ICD-9-CM: 427.89  2/9/2015 - Present        Acute kidney injury ([facility] ICD-10-CM: N17.9  ICD-9-CM: 584.9  2/9/2015 - Present        Anemia ICD-10-CM: D64.9  ICD-9-CM: 285.9  9/9/2014 - Present        GI bleed ICD-10-CM: K92.2  ICD-9-CM: 578.9  9/9/2014 - Present        AF (atrial fibrillation) ([facility]) ICD-10-CM: I48.91  ICD-9-CM: 427.31  6/20/2014 - Present        Hypotension ICD-10-CM: I95.9  ICD-9-CM: 458.9  6/3/2014 - Present        Coronary artery disease ICD-10-CM: I25.10  ICD-9-CM: 414.00  6/3/2014 - Present    Overview Signed 2/10/2015 11:06 AM by [employer]     2007 PCI x2 to LAD with STEPHAN             S/P coronary artery stent placement ICD-10-CM: Z95.5  ICD-9-CM: V45.82  6/3/2014 - Present        Renal failure ICD-10-CM: N19  ICD-9-CM: 789  6/3/2014 - Present        Elevated troponin ICD-10-CM: R77.8  ICD-9-CM: 790.6  6/3/2014 - Present        Pericardial effusion ICD-10-CM: I31.3  ICD-9-CM: 423.9  6/3/2014 - Present        Lactic acidosis ICD-10-CM: E87.2  ICD-9-CM: 276.2  6/3/2014 - Present              Greater than 150 minutes were spent with the patient on counseling and coordination of care    Signed:   Hawa Robins MD  4/30/2021  2:55 PM   .

## 2021-04-30 NOTE — PROGRESS NOTES
Cardiology Progress Note            Admit Date: 4/23/2021  Admit Diagnosis: SOB (shortness of breath) [R06.02]  Date: 4/30/2021     Time: 2:05 PM    Subjective: . Doing well. HR 45 overnight, now in 70's. No complaints except that she is lacking syrup for her pancakes. Awaiting transport for home. Assessment and Plan     1. Acute on chronic Heart failure reduced ejection fraction secondary to #1 and #2  2. Coronary disease status post coronary bypass grafting with recent PCI of distal LAD through the LIMA graft. 3.  Mild to moderately reduced LV systolic function  4. At least moderate to severe mitral regurgitation likely functional in nature  5. History of cerebrovascular disease status post carotid endarterectomy and prior CVA  6. Hypertension with intermittent episodes of hypertensive emergency  7. Moderate renal artery disease but not critical enough to require intervention  8. History of paroxysmal atrial fibrillation- remains in NSR. Not on [facility] P[facility]   9. Intermittent left bundle branch block  10. Acute on chronic systolic heart failure secondary to #1 and #9  11. Acute hypoxemic respiratory failure secondary to #1, #4, #10  12. CKD  13. HTN. Plan:Ms. Ramos present to the hospital with acute hypoxemic respiratory failure with sats in 70s due to combination of acute decompensated congestive heart failure, likely hypertensive emergency, functional mitral regurgitation getting worse in the setting of hypertensive emergency. Repeat echo 4/27/21 with slight increase in EF, now 35-40%. Today,   1. her heart rate remains in 40's-70.s  Remain off coreg  Will stop coreg. 2. Cardiomyopathy appears compensated. Continue Losartan, bumex. (creatinine improving 1.4 today)  No Entresto due to cost. Remain off coreg due to notable bradycardia on coreg. 3.Continue Plavix, ASA, statin for CAD, recent stents.   Not a candidate for CRT at this time as LBBB is intermittent. 5. Follow up has been arranged- uncertain if she will followup so have requested home health visits, cardiopulmonary assessment and lab draw next week in case she does not follow up. Also inquired as to [facility] options with case management. 6.OK for discharge. Future Appointments   Date Time Provider Abner Mendiola   2021  2:40 PM MD MARISOL Ng AMB       [employer]. SUAD Sue 2021 4:44 PM     Cardiology Attending:Patient seen and examined. I agree with NP assessment and plans. Bradycardia resolved off Coreg, leaving today. Karyna Martinez MD 2021 10:39 AM       Followup has been arranged as below, if pt able to arrange transportation. Future Appointments   Date Time Provider Abner Mendiola   2021  2:40 PM MD MARISOL Ng BS AMB       Cardiac testin21   ECHO ADULT FOLLOW-UP OR LIMITED 2021    Narrative · LV: Estimated LVEF is 35 - 40%. Visually measured ejection fraction. Normal cavity size. Mild concentric hypertrophy. Moderately and globally   reduced systolic function. Left ventricular diastolic dysfunction. · MV: Mitral valve non-specific thickening. Severe mitral annular   calcification.         Signed by: Lui Brar MD         [facility]  Past Medical History:   Diagnosis Date    Anxiety disorder     Atrial fibrillation ([address]     CAD (coronary artery disease) 2007    stents, CABG x 3v    Carotid stenosis     Cervical stenosis of spinal canal 2019    Chronic kidney disease     Cough     CVA (cerebral vascular accident) ([address] 2019    left lacunar infarct at head of caudate    Depression     AND CHRONIC ANXIETY    Diabetes ([address]     GERD (gastroesophageal reflux disease)     High cholesterol     History of peptic ulcer     Bleeding ulcer with increased NSAID use    Hypertension     Left carotid stenosis 2019    s/p left CEA with Dr. Tami Tejeda  MI, old     PUD (peptic ulcer disease)     Stroke [facility]     Tremor     Valvular heart disease       Social Hx  Social History     Socioeconomic History    Marital status: SINGLE     Spouse name: Not on file    Number of children: Not on file    Years of education: Not on file    Highest education level: Not on file   Occupational History    Occupation: Retired realestate/teacher   Social Needs    Financial resource strain: Not on file    Food insecurity     Worry: Not on file     Inability: Not on file   [employer] needs     Medical: Not on file     Non-medical: Not on file   Tobacco Use    Smoking status: Former Smoker     Packs/day: 0.25     Years: 5.00     Pack years: 1.25     Types: Cigarettes     Quit date:      Years since quittin.3    Smokeless tobacco: Never Used   Substance and Sexual Activity    Alcohol use:  Yes     Alcohol/week: 0.0 standard drinks     Comment: Rare    Drug use: No    Sexual activity: Not on file   Lifestyle    Physical activity     Days per week: Not on file     Minutes per session: Not on file    Stress: Not on file   Relationships    Social connections     Talks on phone: Not on file     Gets together: Not on file     Attends Judaism service: Not on file     Active member of club or organization: Not on file     Attends meetings of clubs or organizations: Not on file     Relationship status: Not on file    Intimate partner violence     Fear of current or ex partner: Not on file     Emotionally abused: Not on file     Physically abused: Not on file     Forced sexual activity: Not on file   Other Topics Concern    Not on file   Social History Narrative    Lives in [facility]       Objective:      Physical Exam:                Visit Vitals  BP (!) 131/56 (BP 1 Location: Left upper arm, BP Patient Position: At rest)   Pulse (!) 52   Temp 97.8 °F (36.6 °C)   Resp 18   Ht 5' 5\" (1.651 m)   Wt 145 lb 12.8 oz (66.1 kg)   SpO2 100%   BMI 24.26 kg/m²          General Appearance:   Well developed, well nourished,alert and oriented x 3, and   individual in no acute distress. Ears/Nose/Mouth/Throat:    Hearing grossly normal.         Neck:  Supple. Chest:    no use of accessory muscles. .   Cardiovascular:   Regular rate and rhythm, S1, S2 normal, no murmur. Abdomen:    Soft, non-tender, bowel sounds are active. Extremities:  No edema bilaterally. Skin:  Warm and dry.      Telemetry: NSR          Data Review:    Labs:    Recent Results (from the past 24 hour(s))   RENAL FUNCTION PANEL    Collection Time: 04/30/21  2:42 AM   Result Value Ref Range    Sodium 136 136 - 145 mmol/L    Potassium 4.3 3.5 - 5.1 mmol/L    Chloride 104 97 - 108 mmol/L    CO2 25 21 - 32 mmol/L    Anion gap 7 5 - 15 mmol/L    Glucose 137 (H) 65 - 100 mg/dL    BUN 44 (H) 6 - 20 MG/DL    Creatinine 1.40 (H) 0.55 - 1.02 MG/DL    BUN/Creatinine ratio 31 (H) 12 - 20      GFR est AA 44 (L) >60 ml/min/1.73m2    GFR est non-AA 36 (L) >60 ml/min/1.73m2    Calcium 9.4 8.5 - 10.1 MG/DL    Phosphorus 3.9 2.6 - 4.7 MG/DL    Albumin 3.5 3.5 - 5.0 g/dL          Radiology:        Current Facility-Administered Medications   Medication Dose Route Frequency    losartan (COZAAR) tablet 50 mg  50 mg Oral DAILY    amLODIPine (NORVASC) tablet 10 mg  10 mg Oral DAILY    busPIRone (BUSPAR) tablet 5 mg  5 mg Oral TID    bumetanide (BUMEX) tablet 2 mg  2 mg Oral DAILY    albuterol (PROVENTIL VENTOLIN) nebulizer solution 2.5 mg  2.5 mg Nebulization Q4H PRN    aspirin delayed-release tablet 81 mg  81 mg Oral QHS    atorvastatin (LIPITOR) tablet 40 mg  40 mg Oral QHS    carbidopa-levodopa (SINEMET)  mg per tablet 2 Tab  2 Tab Oral QID    clopidogreL (PLAVIX) tablet 75 mg  75 mg Oral DAILY AFTER BREAKFAST    DULoxetine (CYMBALTA) capsule 120 mg  120 mg Oral DAILY    pantoprazole (PROTONIX) tablet 40 mg  40 mg Oral ACB    sodium chloride (NS) flush 5-40 mL  5-40 mL IntraVENous Q8H    sodium chloride (NS) flush 5-40 mL  5-40 mL IntraVENous PRN    nitroglycerin (NITROSTAT) tablet 0.4 mg  0.4 mg SubLINGual Q5MIN PRN    acetaminophen (TYLENOL) tablet 650 mg  650 mg Oral Q4H PRN    heparin (porcine) injection 5,000 Units  5,000 Units SubCUTAneous SUAD Martin.  SUAD Sue 4/30/2021 4:51 PM      [facility] [address] [place] [address], [address] [place] [address] [address]   [place]   [phone]

## 2021-04-30 NOTE — PROGRESS NOTES
Bedside shift change report given to [address] (oncoming nurse) by Gurpreet Lundberg (offgoing nurse). Report included the following information SBAR, Kardex, ED Summary, [facility] and Recent Results.

## 2021-04-30 NOTE — PROGRESS NOTES
Spoke with CM at change of shift that pt was to leave tonight but transport is unsure if he could make it here tonight. Per Agib at [address] it will work better for pt to be picked up in the morning. Imelda Luke NP came and spoke with pt at bedside with this nurse and pt would like to leave in the AM. Joseluis spoke with the chief hospitalist and confirmed this was ok. Pt, nurse and NP all in agreement to D/C pt in AM. KASSY JOHNSON. Someone has already arranged for the food to be delivered tomorrow to pt's home.

## 2021-04-30 NOTE — PROGRESS NOTES
Problem: Risk for Spread of Infection  Goal: Prevent transmission of infectious organism to others  Description: Prevent the transmission of infectious organisms to other patients, staff members, and visitors. Outcome: Resolved/Met     Problem: Patient Education:  Go to Education Activity  Goal: Patient/Family Education  Outcome: Resolved/Met     Problem: Breathing Pattern - Ineffective  Goal: *Absence of hypoxia  Outcome: Resolved/Met  Goal: *Use of effective breathing techniques  Outcome: Resolved/Met  Goal: *PALLIATIVE CARE:  Alleviation of Dyspnea  Outcome: Resolved/Met     Problem: Patient Education: Go to Patient Education Activity  Goal: Patient/Family Education  Outcome: Resolved/Met     Problem: Falls - Risk of  Goal: *Absence of Falls  Description: Document Maylin Fall Risk and appropriate interventions in the flowsheet. Outcome: Resolved/Met     Problem: Patient Education: Go to Patient Education Activity  Goal: Patient/Family Education  Outcome: Resolved/Met     Problem: Patient Education: Go to Patient Education Activity  Goal: Patient/Family Education  Outcome: Resolved/Met     Problem: Patient Education: Go to Patient Education Activity  Goal: Patient/Family Education  Outcome: Resolved/Met     Problem: Pressure Injury - Risk of  Goal: *Prevention of pressure injury  Description: Document Gregorio Scale and appropriate interventions in the flowsheet.   Outcome: Resolved/Met     Problem: Patient Education: Go to Patient Education Activity  Goal: Patient/Family Education  Outcome: Resolved/Met

## 2021-04-30 NOTE — PROGRESS NOTES
I have reviewed discharge instructions with the patient. The patient verbalized understanding. Discussed with the patient and all questioned fully answered. She will call 911 if any problems arise. Discharge medications reviewed with patient and appropriate educational materials and side effects teaching were provided. Patient transported downstairs with all Belongings including phone with  and Boxes.

## 2021-04-30 NOTE — PROGRESS NOTES
Bedside and Verbal shift change report given to Dylon MIGUEL (oncoming nurse) by Michelle Guerrero (offgoing nurse).  Report included the following information SBAR, Kardex, ED Summary, Intake/Output, MAR, Accordion, Recent Results and Cardiac Rhythm SB.

## 2021-07-16 ENCOUNTER — OFFICE VISIT (OUTPATIENT)
Dept: CARDIOLOGY CLINIC | Age: 81
End: 2021-07-16
Payer: MEDICARE

## 2021-07-16 VITALS
HEART RATE: 66 BPM | BODY MASS INDEX: 27.49 KG/M2 | WEIGHT: 165 LBS | DIASTOLIC BLOOD PRESSURE: 62 MMHG | HEIGHT: 65 IN | RESPIRATION RATE: 16 BRPM | OXYGEN SATURATION: 98 % | SYSTOLIC BLOOD PRESSURE: 130 MMHG

## 2021-07-16 DIAGNOSIS — I25.10 CORONARY ARTERY DISEASE INVOLVING NATIVE CORONARY ARTERY OF NATIVE HEART WITHOUT ANGINA PECTORIS: ICD-10-CM

## 2021-07-16 DIAGNOSIS — I10 ESSENTIAL HYPERTENSION: ICD-10-CM

## 2021-07-16 DIAGNOSIS — Z95.1 S/P CABG (CORONARY ARTERY BYPASS GRAFT): ICD-10-CM

## 2021-07-16 DIAGNOSIS — I50.9 CONGESTIVE HEART FAILURE, UNSPECIFIED HF CHRONICITY, UNSPECIFIED HEART FAILURE TYPE (HCC): ICD-10-CM

## 2021-07-16 DIAGNOSIS — N18.32 STAGE 3B CHRONIC KIDNEY DISEASE (HCC): ICD-10-CM

## 2021-07-16 DIAGNOSIS — I25.5 ISCHEMIC CARDIOMYOPATHY: Primary | ICD-10-CM

## 2021-07-16 PROCEDURE — 1090F PRES/ABSN URINE INCON ASSESS: CPT | Performed by: SPECIALIST

## 2021-07-16 PROCEDURE — G8536 NO DOC ELDER MAL SCRN: HCPCS | Performed by: SPECIALIST

## 2021-07-16 PROCEDURE — G8510 SCR DEP NEG, NO PLAN REQD: HCPCS | Performed by: SPECIALIST

## 2021-07-16 PROCEDURE — G8419 CALC BMI OUT NRM PARAM NOF/U: HCPCS | Performed by: SPECIALIST

## 2021-07-16 PROCEDURE — G8754 DIAS BP LESS 90: HCPCS | Performed by: SPECIALIST

## 2021-07-16 PROCEDURE — 1101F PT FALLS ASSESS-DOCD LE1/YR: CPT | Performed by: SPECIALIST

## 2021-07-16 PROCEDURE — G8400 PT W/DXA NO RESULTS DOC: HCPCS | Performed by: SPECIALIST

## 2021-07-16 PROCEDURE — G8752 SYS BP LESS 140: HCPCS | Performed by: SPECIALIST

## 2021-07-16 PROCEDURE — G8427 DOCREV CUR MEDS BY ELIG CLIN: HCPCS | Performed by: SPECIALIST

## 2021-07-16 PROCEDURE — 99215 OFFICE O/P EST HI 40 MIN: CPT | Performed by: SPECIALIST

## 2021-07-16 RX ORDER — HYDRALAZINE HYDROCHLORIDE 50 MG/1
50 TABLET, FILM COATED ORAL 3 TIMES DAILY
Qty: 90 TABLET | Refills: 5 | Status: SHIPPED | OUTPATIENT
Start: 2021-07-16 | End: 2022-02-11

## 2021-07-16 RX ORDER — BUMETANIDE 2 MG/1
2 TABLET ORAL 2 TIMES DAILY
Qty: 60 TABLET | Refills: 5 | Status: SHIPPED | OUTPATIENT
Start: 2021-07-16 | End: 2021-07-20 | Stop reason: SDUPTHER

## 2021-07-16 RX ORDER — AMLODIPINE BESYLATE 2.5 MG/1
2.5 TABLET ORAL DAILY
Qty: 30 TABLET | Refills: 5 | Status: SHIPPED
Start: 2021-07-16 | End: 2022-01-06

## 2021-07-16 RX ORDER — CARVEDILOL 3.12 MG/1
3.12 TABLET ORAL 2 TIMES DAILY WITH MEALS
Qty: 60 TABLET | Refills: 5 | Status: SHIPPED
Start: 2021-07-16 | End: 2022-01-07

## 2021-07-16 RX ORDER — LOSARTAN POTASSIUM 50 MG/1
50 TABLET ORAL DAILY
Qty: 30 TABLET | Refills: 5 | Status: SHIPPED
Start: 2021-07-16 | End: 2021-07-20

## 2021-07-16 RX ORDER — ATORVASTATIN CALCIUM 40 MG/1
40 TABLET, FILM COATED ORAL
Qty: 30 TABLET | Refills: 5 | Status: SHIPPED | OUTPATIENT
Start: 2021-07-16

## 2021-07-16 NOTE — PROGRESS NOTES
HISTORY OF PRESENT ILLNESS  Tierra Ya is a [de-identified] y.o. female. She has coronary disease and ischemic cardiomyopathy and a history of bypass surgery. She was in the hospital in April with heart failure. She has since been back in long-term care at independent living but they do not provide meals. She was seen with her daughter. Apparently she calls out for [employer] a lot and now her legs have been swelling. Her last echocardiogram showed ejection fraction of 35%. She recently ran out of her medications. Her creatinine is in the range of 1.4. She also has trouble sleeping. [facility]  Patient Active Problem List   Diagnosis Code    Hypotension I95.9    Coronary artery disease I25.10    S/P coronary artery stent placement Z95.5    Renal failure N19    Elevated troponin R77.8    Pericardial effusion I31.3    Lactic acidosis E87.2    AF (atrial fibrillation) ([facility]) I48.91    Anemia D64.9    GI bleed K92.2    Syncope R55    Bradycardia R00.1    Acute kidney injury ([facility] N17.9    Postoperative anemia due to acute blood loss D62    S/P CABG (coronary artery bypass graft) Z95.1    Edema R60.9    Diabetes mellitus ([facility]) E11.9    CKD (chronic kidney disease), stage III ([facility]) N18.30    Fall W19. [place]    Acute ischemic stroke ([facility] I63.9    HTN (hypertension) I10    Carotid artery stenosis, symptomatic, left I65.22    Generalized weakness R53.1    Weakness R53.1    CHF (congestive heart failure) ([facility]) I50.9    CHF exacerbation ([facility]) I50.9    UTI (urinary tract infection) N39.0    SOB (shortness of breath) R06.02     Current Outpatient Medications   Medication Sig Dispense Refill    amLODIPine (NORVASC) 2.5 mg tablet Take 1 Tablet by mouth daily. 30 Tablet 5    hydrALAZINE (APRESOLINE) 50 mg tablet Take 1 Tablet by mouth three (3) times daily. 90 Tablet 5    atorvastatin (LIPITOR) 40 mg tablet Take 1 Tablet by mouth nightly.  30 Tablet 5    bumetanide (BUMEX) 2 mg tablet Take 1 Tablet by mouth two (2) times a day. 60 Tablet 5    carvediloL (COREG) 3.125 mg tablet Take 1 Tablet by mouth two (2) times daily (with meals). 60 Tablet 5    losartan (COZAAR) 50 mg tablet Take 1 Tablet by mouth daily. 30 Tablet 5    busPIRone (BUSPAR) 5 mg tablet Take 1 Tab by mouth three (3) times daily. 90 Tab 0    gabapentin (NEURONTIN) 100 mg capsule Take 100 mg by mouth nightly.  nitroglycerin (Nitrostat) 0.4 mg SL tablet 0.4 mg by SubLINGual route every five (5) minutes as needed for Chest Pain. Up to 3 doses.  aspirin delayed-release (Ecotrin Low Strength) 81 mg tablet Take 81 mg by mouth nightly.  clopidogreL (Plavix) 75 mg tab Take 75 mg by mouth daily (after breakfast).  carbidopa-levodopa (SINEMET)  mg per tablet Take 2 Tabs by mouth four (4) times daily. 720 Tab 1    acetaminophen (TYLENOL) 325 mg tablet Take 650 mg by mouth every six (6) hours as needed for Pain or Fever.  pantoprazole (PROTONIX) 40 mg tablet Take 40 mg by mouth Daily (before breakfast). Indications: h/o bleeding ulcer with nsaid      DULoxetine (CYMBALTA) 60 mg capsule Take 120 mg by mouth daily. Indications: Anxiousness associated with Depression      melatonin 3 mg tablet Take 6 mg by mouth nightly. (Patient not taking: Reported on 7/16/2021)      potassium chloride SR (KLOR-CON 10) 10 mEq tablet Take 20 mEq by mouth daily. (Patient not taking: Reported on 7/16/2021)      cyanocobalamin (VITAMIN B12) 100 mcg tablet Take 100 mcg by mouth daily. (Patient not taking: Reported on 7/16/2021)      vit C,W-Jx-sziwl-lutein-zeaxan (PRESERVISION AREDS-2) 149-952-20-1 mg-unit-mg-mg cap capsule Take 1 Cap by mouth two (2) times a day. (Patient not taking: Reported on 7/16/2021)      senna-docusate (SENNA WITH DOCUSATE SODIUM) 8.6-50 mg per tablet Take 1 Tab by mouth nightly. (Patient not taking: Reported on 7/16/2021)      Cholecalciferol, Vitamin D3, 1,000 unit cap Take 1,000 Units by mouth daily.  (Patient not taking: Reported on 7/16/2021)      multivitamins-minerals-lutein (CENTRUM SILVER) tab tablet Take 1 Tab by mouth daily. (Patient not taking: Reported on 7/16/2021)       Past Medical History:   Diagnosis Date    Anxiety disorder     Atrial fibrillation ([facility]     CAD (coronary artery disease) 2007    stents, CABG x 3v    Carotid stenosis     Cervical stenosis of spinal canal 07/2019    Chronic kidney disease     Cough     CVA (cerebral vascular accident) ([facility] 07/2019    left lacunar infarct at head of caudate    Depression     AND CHRONIC ANXIETY    Diabetes ([facility] [facility]     GERD (gastroesophageal reflux disease)     High cholesterol     History of peptic ulcer     Bleeding ulcer with increased NSAID use    Hypertension     Left carotid stenosis 07/2019    s/p left CEA with Dr. Kristen Bragg, old 2007    PUD (peptic ulcer disease)     Stroke ([facility] [facility]     Tremor     Valvular heart disease      Past Surgical History:   Procedure Laterality Date    HX CAROTID ENDARTERECTOMY  07/2019    HX CORONARY ARTERY BYPASS GRAFT      HX TONSILLECTOMY  1963    MS CARDIAC SURG PROCEDURE UNLIST      CABG X3 VESSEL    MS TOTAL KNEE ARTHROPLASTY Right 2015       Review of Systems   Constitutional: Positive for malaise/fatigue. Cardiovascular: Positive for leg swelling. Neurological: Positive for weakness. All other systems reviewed and are negative. Visit Vitals  /62 (BP 1 Location: Right arm, BP Patient Position: Sitting, BP Cuff Size: Adult)   Pulse 66   Resp 16   Ht 5' 5\" (1.651 m)   Wt 165 lb (74.8 kg)   SpO2 98%   BMI 27.46 kg/m²       Physical Exam  Vitals and nursing note reviewed. Constitutional:       Appearance: She is obese. HENT:      Head: Normocephalic. Right Ear: External ear normal.      Left Ear: External ear normal.      Nose: Nose normal.      Mouth/Throat:      Mouth: Mucous membranes are moist.   Eyes:      Extraocular Movements: Extraocular movements intact. Cardiovascular:      Rate and Rhythm: Normal rate and regular rhythm. Heart sounds: Normal heart sounds. Pulmonary:      Breath sounds: Normal breath sounds. Abdominal:      Palpations: Abdomen is soft. Musculoskeletal:      Cervical back: Normal range of motion. Right lower leg: Edema present. Left lower leg: Edema present. Skin:     General: Skin is warm. Neurological:      Mental Status: She is alert and oriented to person, place, and time. Psychiatric:         Mood and Affect: Mood normal.         ASSESSMENT and PLAN  Unfortunately she spends all of her time in a wheelchair now. But she does have significant lower extremity edema. I suspect some of this is due to her eating salty food. She takes Bumex 2 mg a day and I will increase it to 2 mg twice a day. She is also an amlodipine 10 mg a day and I will reduce this to 2.5 mg a day. She takes hydralazine 25 mg 1-1/2 tablets 3 times a day and I will increase this to 50 mg 3 times a day. I will refill her losartan for now but she will go downstairs and have complete blood work done and we will call about the results. I will plan to see her back for repeat echocardiogram and follow-up in the office. She apparently has made some arrangements to have meals brought in so that she will not have to eat so much pizza.

## 2021-07-20 ENCOUNTER — TELEPHONE (OUTPATIENT)
Dept: CARDIOLOGY CLINIC | Age: 81
End: 2021-07-20

## 2021-07-20 DIAGNOSIS — N18.32 STAGE 3B CHRONIC KIDNEY DISEASE (HCC): Primary | ICD-10-CM

## 2021-07-20 DIAGNOSIS — I10 ESSENTIAL HYPERTENSION: ICD-10-CM

## 2021-07-20 RX ORDER — BUMETANIDE 2 MG/1
2 TABLET ORAL DAILY
Qty: 90 TABLET | Refills: 1 | Status: SHIPPED | OUTPATIENT
Start: 2021-07-20 | End: 2021-08-06

## 2021-07-20 NOTE — TELEPHONE ENCOUNTER
Patient calling to speak to the nurse, the doctor changed her bumex to once a day and the pharmacy states that a new prescription will need to called in for this, [facility] [phone], please advise

## 2021-07-20 NOTE — TELEPHONE ENCOUNTER
Called patient. Verified patient's identity with two identifiers. Notified her of Dr. Jose Nj recommendations. Patient wrote instructions down. She will try to have labs prior to appointment, but will have done day of if unable to. Patient verbalized understanding and denied further questions or concerns.        Future Appointments   Date Time Provider Abner Mendiola   9/8/2021  2:00 PM CHINYERE ARIAS   9/8/2021  2:40 PM MD MARISOL Johnson AMB

## 2021-07-20 NOTE — TELEPHONE ENCOUNTER
Requested Prescriptions     Signed Prescriptions Disp Refills    bumetanide (BUMEX) 2 mg tablet 90 Tablet 1     Sig: Take 1 Tablet by mouth daily.      Authorizing Provider: Pema Ayala     Ordering User: Genet Butts    Per Dr. Fab Scott verbal order     Also made note to pharmacy that losartan was stopped

## 2021-07-20 NOTE — TELEPHONE ENCOUNTER
----- Message from Sharlene Farah MD sent at 7/20/2021  8:48 AM EDT -----  Have her reduce the Bumex back to 2 mg once per day, and stop the losartan.   Repeat BMP when she returns, when is that?  ----- Message -----  From: Jim Mejia In [place]  Sent: 7/16/2021   5:20 PM EDT  To: Sharlene Farah MD

## 2021-08-03 PROBLEM — I50.9 CHF (CONGESTIVE HEART FAILURE) (HCC): Status: RESOLVED | Noted: 2020-09-22 | Resolved: 2021-08-03

## 2021-08-06 ENCOUNTER — TELEPHONE (OUTPATIENT)
Dept: CARDIOLOGY CLINIC | Age: 81
End: 2021-08-06

## 2021-08-06 DIAGNOSIS — I10 ESSENTIAL HYPERTENSION: ICD-10-CM

## 2021-08-06 DIAGNOSIS — N18.32 STAGE 3B CHRONIC KIDNEY DISEASE (HCC): ICD-10-CM

## 2021-08-06 RX ORDER — BUMETANIDE 1 MG/1
1 TABLET ORAL DAILY
Qty: 30 TABLET | Refills: 3 | Status: SHIPPED
Start: 2021-08-06 | End: 2021-11-03

## 2021-08-06 NOTE — TELEPHONE ENCOUNTER
Called pt. Verified patient's identity with two identifiers. Notified her of Dr. Joann Rincon message. Patient stated her pharmacy just needs to know because they package meds. Patient verbalized understanding and denied further questions or concerns. Dr. Erica Oliver-  Looked back and we already had her cut her bumex back to only 2 mg once daily on 7/20 and notified [facility]. She has f/u and echo with you 9/8. Maybe we can send her for labs again that day?     Future Appointments   Date Time Provider Abner Mendiola   9/8/2021  2:00 PM CHINYERE ARIAS   9/8/2021  2:40 PM MD MARISOL Lopes AMB

## 2021-08-06 NOTE — TELEPHONE ENCOUNTER
----- Message from Brandon Smith MD sent at 8/6/2021 10:01 AM EDT -----  Reduce Bumex to 2 mg once per day not twice  ----- Message -----  From: Jim Mejia [employer]  Sent: 8/5/2021   5:32 PM EDT  To: Brandon Smith MD

## 2021-08-06 NOTE — TELEPHONE ENCOUNTER
Requested Prescriptions     Signed Prescriptions Disp Refills    bumetanide (BUMEX) 1 mg tablet 30 Tablet 3     Sig: Take 1 Tablet by mouth daily.      Authorizing Provider: Guzman Boyer     Ordering User: Prasad Rossi    Per Dr. Shakira Villa verbal order

## 2021-08-26 ENCOUNTER — ANCILLARY PROCEDURE (OUTPATIENT)
Dept: CARDIOLOGY CLINIC | Age: 81
End: 2021-08-26
Payer: MEDICARE

## 2021-08-26 ENCOUNTER — OFFICE VISIT (OUTPATIENT)
Dept: CARDIOLOGY CLINIC | Age: 81
End: 2021-08-26

## 2021-08-26 VITALS
HEART RATE: 66 BPM | DIASTOLIC BLOOD PRESSURE: 70 MMHG | OXYGEN SATURATION: 96 % | WEIGHT: 168 LBS | BODY MASS INDEX: 27.99 KG/M2 | SYSTOLIC BLOOD PRESSURE: 120 MMHG | RESPIRATION RATE: 20 BRPM | HEIGHT: 65 IN

## 2021-08-26 VITALS — HEIGHT: 65 IN | BODY MASS INDEX: 27.99 KG/M2 | WEIGHT: 168 LBS

## 2021-08-26 DIAGNOSIS — I50.9 CONGESTIVE HEART FAILURE, UNSPECIFIED HF CHRONICITY, UNSPECIFIED HEART FAILURE TYPE (HCC): ICD-10-CM

## 2021-08-26 DIAGNOSIS — R53.1 GENERALIZED WEAKNESS: ICD-10-CM

## 2021-08-26 DIAGNOSIS — I25.5 ISCHEMIC CARDIOMYOPATHY: ICD-10-CM

## 2021-08-26 DIAGNOSIS — I10 ESSENTIAL HYPERTENSION: ICD-10-CM

## 2021-08-26 DIAGNOSIS — Z95.1 S/P CABG (CORONARY ARTERY BYPASS GRAFT): ICD-10-CM

## 2021-08-26 DIAGNOSIS — N18.32 STAGE 3B CHRONIC KIDNEY DISEASE (HCC): ICD-10-CM

## 2021-08-26 DIAGNOSIS — I25.10 CORONARY ARTERY DISEASE INVOLVING NATIVE CORONARY ARTERY OF NATIVE HEART WITHOUT ANGINA PECTORIS: Primary | ICD-10-CM

## 2021-08-26 LAB
AV R PG: 69.49 MMHG
ECHO AO ROOT DIAM: 2.92 CM
ECHO AR MAX VEL PISA: 416.82 CM/S
ECHO AV AREA PEAK VELOCITY: 1.45 CM2
ECHO AV AREA VTI: 1.57 CM2
ECHO AV AREA/BSA PEAK VELOCITY: 0.8 CM2/M2
ECHO AV AREA/BSA VTI: 0.9 CM2/M2
ECHO AV MEAN GRADIENT: 8.38 MMHG
ECHO AV PEAK GRADIENT: 13.98 MMHG
ECHO AV PEAK VELOCITY: 186.98 CM/S
ECHO AV REGURGITANT PHT: 576.4 MS
ECHO AV VTI: 38.96 CM
ECHO LA AREA 4C: 25.42 CM2
ECHO LA MAJOR AXIS: 3.81 CM
ECHO LA MINOR AXIS: 2.07 CM
ECHO LA VOL 2C: 91.68 ML (ref 22–52)
ECHO LA VOL 4C: 75.47 ML (ref 22–52)
ECHO LA VOL BP: 91.47 ML (ref 22–52)
ECHO LA VOL/BSA BIPLANE: 49.71 ML/M2 (ref 16–28)
ECHO LA VOLUME INDEX A2C: 49.83 ML/M2 (ref 16–28)
ECHO LA VOLUME INDEX A4C: 41.02 ML/M2 (ref 16–28)
ECHO LV E' LATERAL VELOCITY: 7.57 CM/S
ECHO LV E' SEPTAL VELOCITY: 4.65 CM/S
ECHO LV EDV A2C: 82.5 ML
ECHO LV EDV A4C: 84.59 ML
ECHO LV EDV BP: 85.76 ML (ref 56–104)
ECHO LV EDV INDEX A4C: 46 ML/M2
ECHO LV EDV INDEX BP: 46.6 ML/M2
ECHO LV EDV NDEX A2C: 44.8 ML/M2
ECHO LV EJECTION FRACTION A2C: 65 PERCENT
ECHO LV EJECTION FRACTION A4C: 50 PERCENT
ECHO LV ESV A2C: 28.93 ML
ECHO LV ESV A4C: 42.43 ML
ECHO LV ESV BP: 36.17 ML (ref 19–49)
ECHO LV ESV INDEX A2C: 15.7 ML/M2
ECHO LV ESV INDEX A4C: 23.1 ML/M2
ECHO LV ESV INDEX BP: 19.7 ML/M2
ECHO LV INTERNAL DIMENSION DIASTOLIC: 4.35 CM (ref 3.9–5.3)
ECHO LV INTERNAL DIMENSION SYSTOLIC: 2.6 CM
ECHO LV IVSD: 1.2 CM (ref 0.6–0.9)
ECHO LV MASS 2D: 244 G (ref 67–162)
ECHO LV MASS INDEX 2D: 132.6 G/M2 (ref 43–95)
ECHO LV POSTERIOR WALL DIASTOLIC: 1.66 CM (ref 0.6–0.9)
ECHO LVOT DIAM: 1.68 CM
ECHO LVOT PEAK GRADIENT: 5.95 MMHG
ECHO LVOT PEAK VELOCITY: 121.95 CM/S
ECHO LVOT SV: 61.2 ML
ECHO LVOT VTI: 27.52 CM
ECHO MV A VELOCITY: 114.84 CM/S
ECHO MV E DECELERATION TIME (DT): 261.51 MS
ECHO MV E VELOCITY: 79.93 CM/S
ECHO MV E/A RATIO: 0.7
ECHO MV E/E' LATERAL: 10.56
ECHO MV E/E' RATIO (AVERAGED): 13.87
ECHO MV E/E' SEPTAL: 17.19
ECHO PV MAX VELOCITY: 140.14 CM/S
ECHO PV PEAK INSTANTANEOUS GRADIENT SYSTOLIC: 7.86 MMHG
ECHO RV TAPSE: 1.79 CM (ref 1.5–2)
ECHO TV REGURGITANT MAX VELOCITY: 293.48 CM/S
ECHO TV REGURGITANT PEAK GRADIENT: 34.45 MMHG
LA VOL DISK BP: 86.2 ML (ref 22–52)

## 2021-08-26 PROCEDURE — G8419 CALC BMI OUT NRM PARAM NOF/U: HCPCS | Performed by: SPECIALIST

## 2021-08-26 PROCEDURE — 1101F PT FALLS ASSESS-DOCD LE1/YR: CPT | Performed by: SPECIALIST

## 2021-08-26 PROCEDURE — 1090F PRES/ABSN URINE INCON ASSESS: CPT | Performed by: SPECIALIST

## 2021-08-26 PROCEDURE — G8427 DOCREV CUR MEDS BY ELIG CLIN: HCPCS | Performed by: SPECIALIST

## 2021-08-26 PROCEDURE — G8400 PT W/DXA NO RESULTS DOC: HCPCS | Performed by: SPECIALIST

## 2021-08-26 PROCEDURE — 99214 OFFICE O/P EST MOD 30 MIN: CPT | Performed by: SPECIALIST

## 2021-08-26 PROCEDURE — G8510 SCR DEP NEG, NO PLAN REQD: HCPCS | Performed by: SPECIALIST

## 2021-08-26 PROCEDURE — G8754 DIAS BP LESS 90: HCPCS | Performed by: SPECIALIST

## 2021-08-26 PROCEDURE — G8752 SYS BP LESS 140: HCPCS | Performed by: SPECIALIST

## 2021-08-26 PROCEDURE — 93306 TTE W/DOPPLER COMPLETE: CPT | Performed by: SPECIALIST

## 2021-08-26 PROCEDURE — G8536 NO DOC ELDER MAL SCRN: HCPCS | Performed by: SPECIALIST

## 2021-08-26 RX ORDER — APIXABAN 5 MG/1
TABLET, FILM COATED ORAL
COMMUNITY
Start: 2021-08-18 | End: 2021-08-26 | Stop reason: ALTCHOICE

## 2021-08-26 RX ORDER — CILOSTAZOL 100 MG/1
100 TABLET ORAL
COMMUNITY

## 2021-08-26 RX ORDER — ARIPIPRAZOLE 20 MG/1
20 TABLET ORAL DAILY
COMMUNITY
Start: 2021-08-18 | End: 2022-01-07 | Stop reason: SDUPTHER

## 2021-08-26 RX ORDER — ISOSORBIDE MONONITRATE 30 MG/1
TABLET, EXTENDED RELEASE ORAL
COMMUNITY
Start: 2021-08-18 | End: 2021-08-26 | Stop reason: ALTCHOICE

## 2021-08-26 RX ORDER — TRAMADOL HYDROCHLORIDE 50 MG/1
TABLET ORAL AS NEEDED
COMMUNITY
Start: 2021-08-06 | End: 2021-11-03

## 2021-08-26 NOTE — PROGRESS NOTES
HISTORY OF PRESENT ILLNESS  Bruce Rojas is a [de-identified] y.o. female. She has coronary disease status post bypass surgery. She lives now in an independent facility and therefore asked to provide her own food which includes pizza. She had a lot of edema and was on increasing doses of Bumex but we asked her to cut back after recent blood work showed worsening renal function. She is trying to cut back on the pizza and the swelling in her legs is present but better. In the past she had an ejection fraction in the range of 35% due to ischemic cardiomyopathy but an echo in the office today showed that her heart function is improved and her ejection fraction is now in the range of 50% with an apical focal abnormality. Her last creatinine was 2.27. Her losartan was stopped because of the renal dysfunction and her Bumex was decreased. I believe that she can stop her isosorbide mononitrate and I will also reduce her Eliquis to 2.5 mg twice daily today. She will also stop her aspirin since she also takes Plavix and she will have lab work done today as well. She is seen with her niece who helps to bring her food. HPI  Patient Active Problem List   Diagnosis Code    Hypotension I95.9    Coronary artery disease I25.10    S/P coronary artery stent placement Z95.5    Renal failure N19    Elevated troponin R77.8    Pericardial effusion I31.3    Lactic acidosis E87.2    AF (atrial fibrillation) ([facility]) I48.91    Anemia D64.9    GI bleed K92.2    Syncope R55    Bradycardia R00.1    Acute kidney injury ([address] N17.9    Postoperative anemia due to acute blood loss D62    S/P CABG (coronary artery bypass graft) Z95.1    Edema R60.9    Diabetes mellitus ([facility]) E11.9    CKD (chronic kidney disease), stage III ([facility]) N18.30    Fall W19. Eliot Mode    Acute ischemic stroke ([facility] I63.9    HTN (hypertension) I10    Carotid artery stenosis, symptomatic, left I65.22    Generalized weakness R53.1    Weakness R53.1    CHF exacerbation ([facility]) I50.9    UTI (urinary tract infection) N39.0    SOB (shortness of breath) R06.02     Current Outpatient Medications   Medication Sig Dispense Refill    ARIPiprazole (ABILIFY) 20 mg tablet       traMADoL (ULTRAM) 50 mg tablet as needed.  cilostazoL (PLETAL) 100 mg tablet Take  by mouth Before breakfast and dinner.  apixaban (ELIQUIS) 2.5 mg tablet Take 1 Tablet by mouth two (2) times a day. 60 Tablet 11    bumetanide (BUMEX) 1 mg tablet Take 1 Tablet by mouth daily. 30 Tablet 3    amLODIPine (NORVASC) 2.5 mg tablet Take 1 Tablet by mouth daily. 30 Tablet 5    hydrALAZINE (APRESOLINE) 50 mg tablet Take 1 Tablet by mouth three (3) times daily. 90 Tablet 5    atorvastatin (LIPITOR) 40 mg tablet Take 1 Tablet by mouth nightly. 30 Tablet 5    carvediloL (COREG) 3.125 mg tablet Take 1 Tablet by mouth two (2) times daily (with meals). 60 Tablet 5    busPIRone (BUSPAR) 5 mg tablet Take 1 Tab by mouth three (3) times daily. 90 Tab 0    potassium chloride SR (KLOR-CON 10) 10 mEq tablet Take 20 mEq by mouth daily.  nitroglycerin (Nitrostat) 0.4 mg SL tablet 0.4 mg by SubLINGual route every five (5) minutes as needed for Chest Pain. Up to 3 doses.  carbidopa-levodopa (SINEMET)  mg per tablet Take 2 Tabs by mouth four (4) times daily. 720 Tab 1    acetaminophen (TYLENOL) 325 mg tablet Take 650 mg by mouth every six (6) hours as needed for Pain or Fever.  gabapentin (NEURONTIN) 100 mg capsule Take 100 mg by mouth nightly. (Patient not taking: Reported on 8/26/2021)      melatonin 3 mg tablet Take 6 mg by mouth nightly. (Patient not taking: Reported on 7/16/2021)      clopidogreL (Plavix) 75 mg tab Take 75 mg by mouth daily (after breakfast). (Patient not taking: Reported on 8/26/2021)      cyanocobalamin (VITAMIN B12) 100 mcg tablet Take 100 mcg by mouth daily.  (Patient not taking: Reported on 7/16/2021)      vit C,L-Gn-phfyc-lutein-zeaxan (PRESERVISION AREDS-2) 975-608-33-5 mg-unit-mg-mg cap capsule Take 1 Cap by mouth two (2) times a day. (Patient not taking: Reported on 7/16/2021)      senna-docusate (SENNA WITH DOCUSATE SODIUM) 8.6-50 mg per tablet Take 1 Tab by mouth nightly. (Patient not taking: Reported on 8/26/2021)      Cholecalciferol, Vitamin D3, 1,000 unit cap Take 1,000 Units by mouth daily. (Patient not taking: Reported on 8/26/2021)      pantoprazole (PROTONIX) 40 mg tablet Take 40 mg by mouth Daily (before breakfast). Indications: h/o bleeding ulcer with nsaid (Patient not taking: Reported on 8/26/2021)      DULoxetine (CYMBALTA) 60 mg capsule Take 120 mg by mouth daily. Indications: Anxiousness associated with Depression (Patient not taking: Reported on 8/26/2021)      multivitamins-minerals-lutein (CENTRUM SILVER) tab tablet Take 1 Tab by mouth daily.  (Patient not taking: Reported on 7/16/2021)       Past Medical History:   Diagnosis Date    Anxiety disorder     Atrial fibrillation ([address]     CAD (coronary artery disease) 2007    stents, CABG x 3v    Carotid stenosis     Cervical stenosis of spinal canal 07/2019    Chronic kidney disease     Cough     CVA (cerebral vascular accident) ([address] 07/2019    left lacunar infarct at head of caudate    Depression     AND CHRONIC ANXIETY    Diabetes ([address]     GERD (gastroesophageal reflux disease)     High cholesterol     History of peptic ulcer     Bleeding ulcer with increased NSAID use    Hypertension     Left carotid stenosis 07/2019    s/p left CEA with Dr. Paulina Sheth, old 2007    PUD (peptic ulcer disease)     Stroke ([address]     Tremor     Valvular heart disease      Past Surgical History:   Procedure Laterality Date    HX CAROTID ENDARTERECTOMY  07/2019    HX CORONARY ARTERY BYPASS GRAFT      HX TONSILLECTOMY  1963    RI CARDIAC SURG PROCEDURE UNLIST      CABG X3 VESSEL    RI TOTAL KNEE ARTHROPLASTY Right 2015       Review of Systems   Constitutional: Positive for malaise/fatigue. Neurological: Positive for weakness. All other systems reviewed and are negative. Visit Vitals  /70 (BP 1 Location: Right arm, BP Patient Position: Sitting, BP Cuff Size: Adult)   Pulse 66   Resp 20   Ht 5' 5\" (1.651 m)   Wt 168 lb (76.2 kg)   SpO2 96%   BMI 27.96 kg/m²       Physical Exam  Vitals and nursing note reviewed. Constitutional:       Appearance: Normal appearance. HENT:      Head: Normocephalic. Right Ear: External ear normal.      Left Ear: External ear normal.      Nose: Nose normal.      Mouth/Throat:      Mouth: Mucous membranes are moist.   Eyes:      Extraocular Movements: Extraocular movements intact. Cardiovascular:      Rate and Rhythm: Normal rate and regular rhythm. Heart sounds: Normal heart sounds. Pulmonary:      Breath sounds: Normal breath sounds. Abdominal:      Palpations: Abdomen is soft. Musculoskeletal:      Cervical back: Normal range of motion. Right lower leg: Edema present. Left lower leg: Edema present. Skin:     General: Skin is warm. Neurological:      General: No focal deficit present. Mental Status: She is alert. Psychiatric:         Mood and Affect: Mood normal.         ASSESSMENT and PLAN  Overall she is doing better and surprisingly her left ventricular function has improved. She is difficult to manage because of her living situation and lack of transportation. She will have the blood work done today and we will call her niece regarding the results and I will make no changes to her medications as noted above and plan to see her back in 4 months.

## 2021-10-29 ENCOUNTER — HOSPITAL ENCOUNTER (INPATIENT)
Age: 81
LOS: 5 days | Discharge: NURSING, REHAB OR LONG-TERM CARE | DRG: 871 | End: 2021-11-03
Attending: EMERGENCY MEDICINE | Admitting: INTERNAL MEDICINE
Payer: MEDICARE

## 2021-10-29 ENCOUNTER — APPOINTMENT (OUTPATIENT)
Dept: GENERAL RADIOLOGY | Age: 81
DRG: 871 | End: 2021-10-29
Attending: EMERGENCY MEDICINE
Payer: MEDICARE

## 2021-10-29 ENCOUNTER — APPOINTMENT (OUTPATIENT)
Dept: CT IMAGING | Age: 81
DRG: 871 | End: 2021-10-29
Attending: EMERGENCY MEDICINE
Payer: MEDICARE

## 2021-10-29 DIAGNOSIS — N30.00 ACUTE CYSTITIS WITHOUT HEMATURIA: ICD-10-CM

## 2021-10-29 DIAGNOSIS — A41.9 SEPSIS WITHOUT ACUTE ORGAN DYSFUNCTION, DUE TO UNSPECIFIED ORGANISM (HCC): Primary | ICD-10-CM

## 2021-10-29 PROBLEM — L03.119 CELLULITIS AND ABSCESS OF LOWER EXTREMITY: Status: ACTIVE | Noted: 2021-10-29

## 2021-10-29 PROBLEM — L02.419 CELLULITIS AND ABSCESS OF LOWER EXTREMITY: Status: ACTIVE | Noted: 2021-10-29

## 2021-10-29 LAB
ALBUMIN SERPL-MCNC: 3 G/DL (ref 3.5–5)
ALBUMIN/GLOB SERPL: 0.8 {RATIO} (ref 1.1–2.2)
ALP SERPL-CCNC: 123 U/L (ref 45–117)
ALT SERPL-CCNC: 16 U/L (ref 12–78)
ANION GAP SERPL CALC-SCNC: 6 MMOL/L (ref 5–15)
APPEARANCE UR: ABNORMAL
AST SERPL-CCNC: 18 U/L (ref 15–37)
BACTERIA URNS QL MICRO: ABNORMAL /HPF
BASOPHILS # BLD: 0 K/UL (ref 0–0.1)
BASOPHILS NFR BLD: 0 % (ref 0–1)
BILIRUB SERPL-MCNC: 0.5 MG/DL (ref 0.2–1)
BILIRUB UR QL: NEGATIVE
BUN SERPL-MCNC: 47 MG/DL (ref 6–20)
BUN/CREAT SERPL: 20 (ref 12–20)
CALCIUM SERPL-MCNC: 9.7 MG/DL (ref 8.5–10.1)
CHLORIDE SERPL-SCNC: 102 MMOL/L (ref 97–108)
CO2 SERPL-SCNC: 21 MMOL/L (ref 21–32)
COLOR UR: ABNORMAL
COMMENT, HOLDF: NORMAL
CREAT SERPL-MCNC: 2.3 MG/DL (ref 0.55–1.02)
DIFFERENTIAL METHOD BLD: ABNORMAL
EOSINOPHIL # BLD: 0 K/UL (ref 0–0.4)
EOSINOPHIL NFR BLD: 0 % (ref 0–7)
EPITH CASTS URNS QL MICRO: ABNORMAL /LPF
ERYTHROCYTE [DISTWIDTH] IN BLOOD BY AUTOMATED COUNT: 12.4 % (ref 11.5–14.5)
GLOBULIN SER CALC-MCNC: 3.8 G/DL (ref 2–4)
GLUCOSE SERPL-MCNC: 141 MG/DL (ref 65–100)
GLUCOSE UR STRIP.AUTO-MCNC: NEGATIVE MG/DL
HCT VFR BLD AUTO: 32.1 % (ref 35–47)
HGB BLD-MCNC: 10.7 G/DL (ref 11.5–16)
HGB UR QL STRIP: NEGATIVE
IMM GRANULOCYTES # BLD AUTO: 0.2 K/UL (ref 0–0.04)
IMM GRANULOCYTES NFR BLD AUTO: 1 % (ref 0–0.5)
KETONES UR QL STRIP.AUTO: NEGATIVE MG/DL
LACTATE SERPL-SCNC: 1.1 MMOL/L (ref 0.4–2)
LEUKOCYTE ESTERASE UR QL STRIP.AUTO: ABNORMAL
LYMPHOCYTES # BLD: 0.8 K/UL (ref 0.8–3.5)
LYMPHOCYTES NFR BLD: 4 % (ref 12–49)
MCH RBC QN AUTO: 31.8 PG (ref 26–34)
MCHC RBC AUTO-ENTMCNC: 33.3 G/DL (ref 30–36.5)
MCV RBC AUTO: 95.3 FL (ref 80–99)
MONOCYTES # BLD: 0.9 K/UL (ref 0–1)
MONOCYTES NFR BLD: 5 % (ref 5–13)
NEUTS SEG # BLD: 17 K/UL (ref 1.8–8)
NEUTS SEG NFR BLD: 90 % (ref 32–75)
NITRITE UR QL STRIP.AUTO: NEGATIVE
NRBC # BLD: 0 K/UL (ref 0–0.01)
NRBC BLD-RTO: 0 PER 100 WBC
PH UR STRIP: 5 [PH] (ref 5–8)
PLATELET # BLD AUTO: 346 K/UL (ref 150–400)
PMV BLD AUTO: 9 FL (ref 8.9–12.9)
POTASSIUM SERPL-SCNC: 4.8 MMOL/L (ref 3.5–5.1)
PROT SERPL-MCNC: 6.8 G/DL (ref 6.4–8.2)
PROT UR STRIP-MCNC: 100 MG/DL
RBC # BLD AUTO: 3.37 M/UL (ref 3.8–5.2)
RBC #/AREA URNS HPF: ABNORMAL /HPF (ref 0–5)
RBC MORPH BLD: ABNORMAL
SAMPLES BEING HELD,HOLD: NORMAL
SODIUM SERPL-SCNC: 129 MMOL/L (ref 136–145)
SP GR UR REFRACTOMETRY: 1.01 (ref 1–1.03)
UA: UC IF INDICATED,UAUC: ABNORMAL
UROBILINOGEN UR QL STRIP.AUTO: 0.2 EU/DL (ref 0.2–1)
WBC # BLD AUTO: 18.9 K/UL (ref 3.6–11)
WBC URNS QL MICRO: ABNORMAL /HPF (ref 0–4)

## 2021-10-29 PROCEDURE — 96374 THER/PROPH/DIAG INJ IV PUSH: CPT

## 2021-10-29 PROCEDURE — 51701 INSERT BLADDER CATHETER: CPT

## 2021-10-29 PROCEDURE — 99285 EMERGENCY DEPT VISIT HI MDM: CPT

## 2021-10-29 PROCEDURE — 81001 URINALYSIS AUTO W/SCOPE: CPT

## 2021-10-29 PROCEDURE — 74011250636 HC RX REV CODE- 250/636: Performed by: EMERGENCY MEDICINE

## 2021-10-29 PROCEDURE — 74011250637 HC RX REV CODE- 250/637: Performed by: INTERNAL MEDICINE

## 2021-10-29 PROCEDURE — 93005 ELECTROCARDIOGRAM TRACING: CPT

## 2021-10-29 PROCEDURE — 36415 COLL VENOUS BLD VENIPUNCTURE: CPT

## 2021-10-29 PROCEDURE — 71045 X-RAY EXAM CHEST 1 VIEW: CPT

## 2021-10-29 PROCEDURE — 80053 COMPREHEN METABOLIC PANEL: CPT

## 2021-10-29 PROCEDURE — 74011250637 HC RX REV CODE- 250/637: Performed by: EMERGENCY MEDICINE

## 2021-10-29 PROCEDURE — 65660000000 HC RM CCU STEPDOWN

## 2021-10-29 PROCEDURE — 87040 BLOOD CULTURE FOR BACTERIA: CPT

## 2021-10-29 PROCEDURE — 74011000258 HC RX REV CODE- 258: Performed by: INTERNAL MEDICINE

## 2021-10-29 PROCEDURE — 74011250636 HC RX REV CODE- 250/636: Performed by: INTERNAL MEDICINE

## 2021-10-29 PROCEDURE — 85025 COMPLETE CBC W/AUTO DIFF WBC: CPT

## 2021-10-29 PROCEDURE — 87077 CULTURE AEROBIC IDENTIFY: CPT

## 2021-10-29 PROCEDURE — 74176 CT ABD & PELVIS W/O CONTRAST: CPT

## 2021-10-29 PROCEDURE — 74011000250 HC RX REV CODE- 250: Performed by: EMERGENCY MEDICINE

## 2021-10-29 PROCEDURE — 87086 URINE CULTURE/COLONY COUNT: CPT

## 2021-10-29 PROCEDURE — 83605 ASSAY OF LACTIC ACID: CPT

## 2021-10-29 PROCEDURE — 87186 SC STD MICRODIL/AGAR DIL: CPT

## 2021-10-29 RX ORDER — SODIUM CHLORIDE 9 MG/ML
75 INJECTION, SOLUTION INTRAVENOUS CONTINUOUS
Status: DISCONTINUED | OUTPATIENT
Start: 2021-10-29 | End: 2021-11-02

## 2021-10-29 RX ORDER — DULOXETIN HYDROCHLORIDE 60 MG/1
120 CAPSULE, DELAYED RELEASE ORAL DAILY
Status: DISCONTINUED | OUTPATIENT
Start: 2021-10-30 | End: 2021-10-30

## 2021-10-29 RX ORDER — THERA TABS 400 MCG
1 TAB ORAL DAILY
Status: DISCONTINUED | OUTPATIENT
Start: 2021-10-30 | End: 2021-11-03 | Stop reason: HOSPADM

## 2021-10-29 RX ORDER — ACETAMINOPHEN 325 MG/1
650 TABLET ORAL
Status: DISCONTINUED | OUTPATIENT
Start: 2021-10-29 | End: 2021-11-03 | Stop reason: HOSPADM

## 2021-10-29 RX ORDER — LANOLIN ALCOHOL/MO/W.PET/CERES
6 CREAM (GRAM) TOPICAL
Status: DISCONTINUED | OUTPATIENT
Start: 2021-10-29 | End: 2021-11-03 | Stop reason: HOSPADM

## 2021-10-29 RX ORDER — ATORVASTATIN CALCIUM 40 MG/1
40 TABLET, FILM COATED ORAL
Status: DISCONTINUED | OUTPATIENT
Start: 2021-10-29 | End: 2021-11-03 | Stop reason: HOSPADM

## 2021-10-29 RX ORDER — SODIUM CHLORIDE 0.9 % (FLUSH) 0.9 %
5-40 SYRINGE (ML) INJECTION EVERY 8 HOURS
Status: DISCONTINUED | OUTPATIENT
Start: 2021-10-29 | End: 2021-11-03 | Stop reason: HOSPADM

## 2021-10-29 RX ORDER — BUSPIRONE HYDROCHLORIDE 5 MG/1
5 TABLET ORAL 3 TIMES DAILY
Status: DISCONTINUED | OUTPATIENT
Start: 2021-10-29 | End: 2021-11-03 | Stop reason: HOSPADM

## 2021-10-29 RX ORDER — ONDANSETRON 4 MG/1
4 TABLET, ORALLY DISINTEGRATING ORAL
Status: DISCONTINUED | OUTPATIENT
Start: 2021-10-29 | End: 2021-11-03 | Stop reason: HOSPADM

## 2021-10-29 RX ORDER — ONDANSETRON 2 MG/ML
4 INJECTION INTRAMUSCULAR; INTRAVENOUS
Status: DISCONTINUED | OUTPATIENT
Start: 2021-10-29 | End: 2021-11-03 | Stop reason: HOSPADM

## 2021-10-29 RX ORDER — ACETAMINOPHEN 500 MG
1000 TABLET ORAL ONCE
Status: COMPLETED | OUTPATIENT
Start: 2021-10-29 | End: 2021-10-29

## 2021-10-29 RX ORDER — ARIPIPRAZOLE 2 MG/1
2 TABLET ORAL DAILY
Status: DISCONTINUED | OUTPATIENT
Start: 2021-10-30 | End: 2021-11-03 | Stop reason: HOSPADM

## 2021-10-29 RX ORDER — CARBIDOPA AND LEVODOPA 25; 100 MG/1; MG/1
2 TABLET ORAL 4 TIMES DAILY
Status: DISCONTINUED | OUTPATIENT
Start: 2021-10-29 | End: 2021-11-03 | Stop reason: HOSPADM

## 2021-10-29 RX ORDER — FAMOTIDINE 20 MG/1
20 TABLET, FILM COATED ORAL 2 TIMES DAILY
Status: DISCONTINUED | OUTPATIENT
Start: 2021-10-30 | End: 2021-10-31

## 2021-10-29 RX ORDER — NITROGLYCERIN 0.4 MG/1
0.4 TABLET SUBLINGUAL
Status: DISCONTINUED | OUTPATIENT
Start: 2021-10-29 | End: 2021-11-03 | Stop reason: HOSPADM

## 2021-10-29 RX ORDER — SODIUM CHLORIDE 0.9 % (FLUSH) 0.9 %
5-40 SYRINGE (ML) INJECTION AS NEEDED
Status: DISCONTINUED | OUTPATIENT
Start: 2021-10-29 | End: 2021-11-03 | Stop reason: HOSPADM

## 2021-10-29 RX ORDER — ACETAMINOPHEN 650 MG/1
650 SUPPOSITORY RECTAL
Status: DISCONTINUED | OUTPATIENT
Start: 2021-10-29 | End: 2021-11-03 | Stop reason: HOSPADM

## 2021-10-29 RX ORDER — CLOPIDOGREL BISULFATE 75 MG/1
75 TABLET ORAL
Status: DISCONTINUED | OUTPATIENT
Start: 2021-10-30 | End: 2021-11-03 | Stop reason: HOSPADM

## 2021-10-29 RX ORDER — POLYETHYLENE GLYCOL 3350 17 G/17G
17 POWDER, FOR SOLUTION ORAL DAILY PRN
Status: DISCONTINUED | OUTPATIENT
Start: 2021-10-29 | End: 2021-11-03 | Stop reason: HOSPADM

## 2021-10-29 RX ORDER — SODIUM CHLORIDE 0.9 % (FLUSH) 0.9 %
5-10 SYRINGE (ML) INJECTION AS NEEDED
Status: DISCONTINUED | OUTPATIENT
Start: 2021-10-29 | End: 2021-11-03 | Stop reason: HOSPADM

## 2021-10-29 RX ORDER — VANCOMYCIN 1.75 GRAM/500 ML IN 0.9 % SODIUM CHLORIDE INTRAVENOUS
1750 ONCE
Status: COMPLETED | OUTPATIENT
Start: 2021-10-29 | End: 2021-10-30

## 2021-10-29 RX ORDER — GABAPENTIN 100 MG/1
100 CAPSULE ORAL
Status: DISCONTINUED | OUTPATIENT
Start: 2021-10-29 | End: 2021-10-30

## 2021-10-29 RX ADMIN — CEFTRIAXONE SODIUM 1 G: 1 INJECTION, POWDER, FOR SOLUTION INTRAMUSCULAR; INTRAVENOUS at 20:37

## 2021-10-29 RX ADMIN — SODIUM CHLORIDE 75 ML/HR: 9 INJECTION, SOLUTION INTRAVENOUS at 23:02

## 2021-10-29 RX ADMIN — Medication 10 ML: at 23:02

## 2021-10-29 RX ADMIN — ACETAMINOPHEN 650 MG: 325 TABLET ORAL at 22:56

## 2021-10-29 RX ADMIN — ACETAMINOPHEN 1000 MG: 500 TABLET ORAL at 18:56

## 2021-10-29 RX ADMIN — PIPERACILLIN AND TAZOBACTAM 3.38 G: 3; .375 INJECTION, POWDER, LYOPHILIZED, FOR SOLUTION INTRAVENOUS at 22:57

## 2021-10-29 RX ADMIN — SODIUM CHLORIDE 1000 ML: 9 INJECTION, SOLUTION INTRAVENOUS at 18:58

## 2021-10-29 NOTE — ED PROVIDER NOTES
Patient is an 29-year-old woman who was sent in by her power of  because she has been unable to care for herself at home. On arrival patient was found to have fever of 102.4. She was unaware of her fever but does endorse chills and general malaise. She is unsure when she started to feel unwell. She has not taken anything for her symptoms and cannot find anything that makes his symptoms better or worse. The history is provided by the patient and the EMS personnel. Fever   This is a new problem. The problem occurs constantly. The problem has not changed since onset. The maximum temperature noted was 102 - 102.9 F. The temperature was taken using an oral thermometer. Pertinent negatives include no chest pain, no diarrhea, no vomiting, no headaches, no muscle aches, no cough, no shortness of breath and no urinary symptoms. She has tried nothing for the symptoms. The treatment provided no relief.         Past Medical History:   Diagnosis Date    Anxiety disorder     Atrial fibrillation ([address]     CAD (coronary artery disease) 2007    stents, CABG x 3v    Carotid stenosis     Cervical stenosis of spinal canal 07/2019    Chronic kidney disease     Cough     CVA (cerebral vascular accident) ([address] 07/2019    left lacunar infarct at head of caudate    Depression     AND CHRONIC ANXIETY    Diabetes ([address]     GERD (gastroesophageal reflux disease)     High cholesterol     History of peptic ulcer     Bleeding ulcer with increased NSAID use    Hypertension     Left carotid stenosis 07/2019    s/p left CEA with Dr. Ashley Pereira, old 2007    PUD (peptic ulcer disease)     Stroke ([address]     Tremor     Valvular heart disease        Past Surgical History:   Procedure Laterality Date    HX CAROTID ENDARTERECTOMY  07/2019    HX CORONARY ARTERY BYPASS GRAFT      HX TONSILLECTOMY  1963    LA CARDIAC SURG PROCEDURE UNLIST      CABG X3 VESSEL    LA TOTAL KNEE ARTHROPLASTY Right 2015         Family History:   Problem Relation Age of Onset    Heart Attack Mother 72        Dec 79yo    Hypertension Mother     Other Father         Unknown    Parkinson's Disease Brother     Anesth Problems Neg Hx        Social History     Socioeconomic History    Marital status: SINGLE     Spouse name: Not on file    Number of children: Not on file    Years of education: Not on file    Highest education level: Not on file   Occupational History    Occupation: Retired realestate/teacher   Tobacco Use    Smoking status: Former Smoker     Packs/day: 0.25     Years: 5.00     Pack years: 1.25     Types: Cigarettes     Quit date:      Years since quittin.8    Smokeless tobacco: Never Used   Substance and Sexual Activity    Alcohol use: Not Currently     Comment: Rare    Drug use: No    Sexual activity: Not on file   Other Topics Concern    Not on file   Social History Narrative    Lives in [facility]     Social Determinants of Health     Financial Resource Strain:     Difficulty of Paying Living Expenses:    Food Insecurity:     Worried About [address] in the Last Year:     [address] in the Last Year:    Transportation Needs:     Lack of Transportation (Medical):  Lack of Transportation (Non-Medical):    Physical Activity:     Days of Exercise per Week:     Minutes of Exercise per Session:    Stress:     Feeling of Stress :    Social Connections:     Frequency of Communication with Friends and Family:     Frequency of Social Gatherings with Friends and Family:     Attends Hinduism Services:     Active Member of Clubs or Organizations:     Attends Club or Organization Meetings:     Marital Status:    Intimate Partner Violence:     Fear of Current or Ex-Partner:     Emotionally Abused:     Physically Abused:     Sexually Abused: ALLERGIES: Patient has no known allergies. Review of Systems   Constitutional: Positive for chills, fatigue and fever.    Respiratory: Negative for cough and shortness of breath. Cardiovascular: Negative for chest pain. Gastrointestinal: Positive for abdominal pain. Negative for constipation, diarrhea, nausea and vomiting. Genitourinary: Negative for dysuria and frequency. Neurological: Positive for tremors. Negative for dizziness, light-headedness and headaches. All other systems reviewed and are negative. Vitals:    10/29/21 1831 10/29/21 1834   BP: (!) 140/62 136/76   Pulse: 76 77   Resp: 20 16   Temp: (!) 102.4 °F (39.1 °C)    SpO2: 97% 100%   Weight: 72.4 kg (159 lb 9.8 oz)             Physical Exam  Vitals and nursing note reviewed. Constitutional:       Appearance: She is well-developed. HENT:      Head: Normocephalic and atraumatic. Eyes:      Pupils: Pupils are equal, round, and reactive to light. Cardiovascular:      Rate and Rhythm: Normal rate and regular rhythm. Pulmonary:      Effort: Pulmonary effort is normal.      Breath sounds: Normal breath sounds. Abdominal:      General: There is no distension. Palpations: Abdomen is soft. Tenderness: There is abdominal tenderness in the right lower quadrant, suprapubic area and left lower quadrant. There is right CVA tenderness and left CVA tenderness. There is no guarding or rebound. Musculoskeletal:      Cervical back: Normal range of motion and neck supple. Skin:     General: Skin is warm and dry. Capillary Refill: Capillary refill takes less than 2 seconds. Neurological:      General: No focal deficit present. Mental Status: She is alert and oriented to person, place, and time.    Psychiatric:         Mood and Affect: Mood normal.         Behavior: Behavior normal.          MDM       Procedures        MEDICAL DECISION MAKING:  [de-identified] y.o. female presents with Fever      DDX includes but not limited to:  PNA, pericarditis, electrolyte abnormality, dehydration, influenza, UTI, pyelonephritis, gastroenteritis, diverticulitis, cholecystitis, UTI, sepsis, severe sepsis, septic shock, covid-19         LABORATORY TESTS:  Labs Reviewed   CULTURE, BLOOD   CULTURE, BLOOD   METABOLIC PANEL, COMPREHENSIVE   CBC WITH AUTOMATED DIFF   SAMPLES BEING HELD   LACTIC ACID   URINALYSIS W/ REFLEX CULTURE       IMAGING RESULTS:  XR CHEST PORT    (Results Pending)   CT ABD PELV WO CONT    (Results Pending)       MEDICATIONS GIVEN:  Medications   sodium chloride (NS) flush 5-10 mL (has no administration in time range)   acetaminophen (TYLENOL) tablet 1,000 mg (has no administration in time range)       PROGRESS NOTE:   8:12 PM Sepsis Re-assessment performed and clinical condition stable    EKG:  Normal sinus rhythm, 69 bpm, left axis deviation, no ST changes, no T wave changes, no ectopy  EKG interpreted by Conchis Horn MD     CONSULTS:  Hospitalist Consult: [address] for Admission  8:12 PM    ED Room Number: ER21/21  Patient Name and age:  Fuentes Jackman [de-identified] y.o.  female  Working Diagnosis:   1. Sepsis without acute organ dysfunction, due to unspecified organism ([facility]    2. Acute cystitis without hematuria        COVID-19 Suspicion:  no  Sepsis present:  yes  Reassessment needed: no  Code Status:  Full Code  Readmission: no  Isolation Requirements:  no  Recommended Level of Care:  med/surg  Department:[facility] Adult ED - 21   Other:  No severe sepsis    PLAN:  - Admit to hospitalist    Total critical care time spent exclusive of procedures:  35 minutes    Conchis Horn MD          Please note that this dictation was completed with [employer], the computer voice recognition software. Quite often unanticipated grammatical, syntax, homophones, and other interpretive errors are inadvertently transcribed by the computer software. Please disregard these errors. Please excuse any errors that have escaped final proofreading.

## 2021-10-29 NOTE — ED TRIAGE NOTES
Pt arrives via EMS with fever and home care issues. Pt has PT and OT at home but pt POA feels pt is not able to take care of self anymore and does not know what to do. Pt temp 102.4 orally.

## 2021-10-30 LAB
ALBUMIN SERPL-MCNC: 2 G/DL (ref 3.5–5)
ALBUMIN/GLOB SERPL: 0.6 {RATIO} (ref 1.1–2.2)
ALP SERPL-CCNC: 106 U/L (ref 45–117)
ALT SERPL-CCNC: 16 U/L (ref 12–78)
ANION GAP SERPL CALC-SCNC: 8 MMOL/L (ref 5–15)
AST SERPL-CCNC: 14 U/L (ref 15–37)
BASOPHILS # BLD: 0 K/UL (ref 0–0.1)
BASOPHILS NFR BLD: 0 % (ref 0–1)
BILIRUB SERPL-MCNC: 0.3 MG/DL (ref 0.2–1)
BUN SERPL-MCNC: 44 MG/DL (ref 6–20)
BUN/CREAT SERPL: 19 (ref 12–20)
CALCIUM SERPL-MCNC: 8.4 MG/DL (ref 8.5–10.1)
CHLORIDE SERPL-SCNC: 107 MMOL/L (ref 97–108)
CHLORIDE UR-SCNC: 10 MMOL/L
CO2 SERPL-SCNC: 19 MMOL/L (ref 21–32)
CREAT SERPL-MCNC: 2.28 MG/DL (ref 0.55–1.02)
CREAT UR-MCNC: 34.6 MG/DL
CREAT UR-MCNC: 35.3 MG/DL
DIFFERENTIAL METHOD BLD: ABNORMAL
EOSINOPHIL # BLD: 0 K/UL (ref 0–0.4)
EOSINOPHIL NFR BLD: 0 % (ref 0–7)
ERYTHROCYTE [DISTWIDTH] IN BLOOD BY AUTOMATED COUNT: 12.4 % (ref 11.5–14.5)
EST. AVERAGE GLUCOSE BLD GHB EST-MCNC: 134 MG/DL
GLOBULIN SER CALC-MCNC: 3.1 G/DL (ref 2–4)
GLUCOSE SERPL-MCNC: 230 MG/DL (ref 65–100)
HBA1C MFR BLD: 6.3 % (ref 4–5.6)
HCT VFR BLD AUTO: 25.4 % (ref 35–47)
HGB BLD-MCNC: 8.4 G/DL (ref 11.5–16)
IMM GRANULOCYTES # BLD AUTO: 0.1 K/UL (ref 0–0.04)
IMM GRANULOCYTES NFR BLD AUTO: 1 % (ref 0–0.5)
LYMPHOCYTES # BLD: 0.7 K/UL (ref 0.8–3.5)
LYMPHOCYTES NFR BLD: 7 % (ref 12–49)
MAGNESIUM SERPL-MCNC: 1.8 MG/DL (ref 1.6–2.4)
MCH RBC QN AUTO: 32.3 PG (ref 26–34)
MCHC RBC AUTO-ENTMCNC: 33.1 G/DL (ref 30–36.5)
MCV RBC AUTO: 97.7 FL (ref 80–99)
MONOCYTES # BLD: 1 K/UL (ref 0–1)
MONOCYTES NFR BLD: 10 % (ref 5–13)
NEUTS SEG # BLD: 7.9 K/UL (ref 1.8–8)
NEUTS SEG NFR BLD: 82 % (ref 32–75)
NRBC # BLD: 0 K/UL (ref 0–0.01)
NRBC BLD-RTO: 0 PER 100 WBC
PLATELET # BLD AUTO: 243 K/UL (ref 150–400)
PMV BLD AUTO: 9 FL (ref 8.9–12.9)
POTASSIUM SERPL-SCNC: 3.7 MMOL/L (ref 3.5–5.1)
PROT SERPL-MCNC: 5.1 G/DL (ref 6.4–8.2)
PROT UR-MCNC: 68 MG/DL (ref 0–11.9)
PROT UR-MCNC: 69 MG/DL (ref 0–11.9)
PROT/CREAT UR-RTO: 2
RBC # BLD AUTO: 2.6 M/UL (ref 3.8–5.2)
RBC MORPH BLD: ABNORMAL
SODIUM SERPL-SCNC: 134 MMOL/L (ref 136–145)
SODIUM UR-SCNC: 20 MMOL/L
TSH SERPL DL<=0.05 MIU/L-ACNC: 0.84 UIU/ML (ref 0.36–3.74)
WBC # BLD AUTO: 9.7 K/UL (ref 3.6–11)

## 2021-10-30 PROCEDURE — 84443 ASSAY THYROID STIM HORMONE: CPT

## 2021-10-30 PROCEDURE — 74011250637 HC RX REV CODE- 250/637: Performed by: INTERNAL MEDICINE

## 2021-10-30 PROCEDURE — 82570 ASSAY OF URINE CREATININE: CPT

## 2021-10-30 PROCEDURE — 65660000000 HC RM CCU STEPDOWN

## 2021-10-30 PROCEDURE — 84156 ASSAY OF PROTEIN URINE: CPT

## 2021-10-30 PROCEDURE — 36415 COLL VENOUS BLD VENIPUNCTURE: CPT

## 2021-10-30 PROCEDURE — 83036 HEMOGLOBIN GLYCOSYLATED A1C: CPT

## 2021-10-30 PROCEDURE — 85025 COMPLETE CBC W/AUTO DIFF WBC: CPT

## 2021-10-30 PROCEDURE — 84300 ASSAY OF URINE SODIUM: CPT

## 2021-10-30 PROCEDURE — 82436 ASSAY OF URINE CHLORIDE: CPT

## 2021-10-30 PROCEDURE — 83735 ASSAY OF MAGNESIUM: CPT

## 2021-10-30 PROCEDURE — 80053 COMPREHEN METABOLIC PANEL: CPT

## 2021-10-30 PROCEDURE — 74011000258 HC RX REV CODE- 258: Performed by: INTERNAL MEDICINE

## 2021-10-30 PROCEDURE — 74011250636 HC RX REV CODE- 250/636: Performed by: INTERNAL MEDICINE

## 2021-10-30 RX ADMIN — APIXABAN 2.5 MG: 2.5 TABLET, FILM COATED ORAL at 09:48

## 2021-10-30 RX ADMIN — CLOPIDOGREL BISULFATE 75 MG: 75 TABLET ORAL at 09:40

## 2021-10-30 RX ADMIN — CARBIDOPA AND LEVODOPA 2 TABLET: 25; 100 TABLET ORAL at 21:00

## 2021-10-30 RX ADMIN — VANCOMYCIN HYDROCHLORIDE 1750 MG: 1 INJECTION, POWDER, LYOPHILIZED, FOR SOLUTION INTRAVENOUS at 02:39

## 2021-10-30 RX ADMIN — PIPERACILLIN AND TAZOBACTAM 3.38 G: 3; .375 INJECTION, POWDER, LYOPHILIZED, FOR SOLUTION INTRAVENOUS at 12:53

## 2021-10-30 RX ADMIN — APIXABAN 2.5 MG: 2.5 TABLET, FILM COATED ORAL at 18:00

## 2021-10-30 RX ADMIN — THERA TABS 1 TABLET: TAB at 09:40

## 2021-10-30 RX ADMIN — BUSPIRONE HYDROCHLORIDE 5 MG: 5 TABLET ORAL at 09:40

## 2021-10-30 RX ADMIN — FAMOTIDINE 20 MG: 20 TABLET, FILM COATED ORAL at 17:48

## 2021-10-30 RX ADMIN — CARBIDOPA AND LEVODOPA 2 TABLET: 25; 100 TABLET ORAL at 12:55

## 2021-10-30 RX ADMIN — ATORVASTATIN CALCIUM 40 MG: 40 TABLET, FILM COATED ORAL at 02:52

## 2021-10-30 RX ADMIN — BUSPIRONE HYDROCHLORIDE 5 MG: 5 TABLET ORAL at 21:00

## 2021-10-30 RX ADMIN — FAMOTIDINE 20 MG: 20 TABLET, FILM COATED ORAL at 09:40

## 2021-10-30 RX ADMIN — ARIPIPRAZOLE 2 MG: 2 TABLET ORAL at 09:48

## 2021-10-30 RX ADMIN — ATORVASTATIN CALCIUM 40 MG: 40 TABLET, FILM COATED ORAL at 20:57

## 2021-10-30 RX ADMIN — CARBIDOPA AND LEVODOPA 2 TABLET: 25; 100 TABLET ORAL at 09:40

## 2021-10-30 RX ADMIN — Medication 30 ML: at 14:00

## 2021-10-30 RX ADMIN — CARBIDOPA AND LEVODOPA 2 TABLET: 25; 100 TABLET ORAL at 02:41

## 2021-10-30 RX ADMIN — SODIUM CHLORIDE 75 ML/HR: 9 INJECTION, SOLUTION INTRAVENOUS at 20:57

## 2021-10-30 RX ADMIN — BUSPIRONE HYDROCHLORIDE 5 MG: 5 TABLET ORAL at 17:48

## 2021-10-30 RX ADMIN — BUSPIRONE HYDROCHLORIDE 5 MG: 5 TABLET ORAL at 00:51

## 2021-10-30 RX ADMIN — CARBIDOPA AND LEVODOPA 2 TABLET: 25; 100 TABLET ORAL at 17:47

## 2021-10-30 NOTE — ED NOTES
Bedside and Verbal shift change report given to Lety Lewis (oncoming nurse) by Maura Maynard (offgoing nurse). Report included the following information SBAR.

## 2021-10-30 NOTE — PROGRESS NOTES
[facility] Adult  Hospitalist Group                                                                                          Hospitalist Progress Note  Srinivas King MD  Answering service: [phone] from in house phone        Date of Service:  10/30/2021  NAME:  Priscila Diaz  :  1940  MRN:  [id number]      Please note that this dictation was completed with [employer], the computer voice recognition software. Quite often unanticipated grammatical, syntax, homophones, and other interpretive errors are inadvertently transcribed by the computer software. Please disregard these errors. Please excuse any errors that have escaped final proofreading. Admission Summary:   an 16-year-old female extremely poor historian. On reviewing the chart, the patient has a history of hypertension, atrial fibrillation, currently on Eliquis for the same. The patient has not been able to take care of herself. Also the patient was sent by her power of  for generalized weakness, failure to thrive, and the patient was not feeling well. The patient upon arrival to the ER was found to have a temperature of 102 and was found to be hypotensive. The labs showed elevated WBC count of 18,000 and creatinine of 2.3. The patient's baseline creatinine is 1.9. The patient was not confused. The other labs that were ordered showed a CT scan which was negative. The urine showed significant urinary tract infection. The patient is currently being admitted for the management of this condition. On further questioning, the patient does complain of some swelling of the bilateral lower extremities which has recently been getting worse, does have a history of peripheral vascular disease, for which she has been taking Pletal for the same. Denies complaints of any chest pain or chest pressure or shortness of breath. Denies complaints of any nausea, vomiting, or diarrhea.   Denies complaints of any urgency, frequency, burning of urination. continues to have low mood. Denies having any suicidal or homicidal ideations.       Interval history / Subjective:     No adverse events overnight   Fever curve is better   Hypotension is improved   UTI on admission     Patient is drowsy this am      Assessment & Plan:       Sepsis   Likely coming from UTI   On vancomycin and zosyn iv   Follow up urine and blood c/s     MORENO on CKD   On ivf , improving     Afib with rvr   Stable on eliquis , bradycardia seen     parkinsons disease   continue sinemet     History of stroke, stable. Monitor the condition for now. History of peptic ulcer disease. We will continue with Pepcid. No signs of gastrointestinal bleed. History of depression, anxiety. Continue with Abilify for now. Dyslipidemia. Continue with Lipitor. Hypertension  . Hold off antihypertensive medications for now. Status post coronary artery bypass grafting and stent. Continue with Plavix. Code status:   DVT prophylaxis:     Care Plan discussed with: Patient/Family  Anticipated Disposition: Home w/Family  Anticipated Discharge: 24 hours to 50 hours     Hospital Problems  Date Reviewed: 8/26/2021        Codes Class Noted POA    Cellulitis and abscess of lower extremity ICD-10-CM: L03.119, L02.419  ICD-9-CM: 682.6  10/29/2021 Unknown        UTI (urinary tract infection) ICD-10-CM: N39.0  ICD-9-CM: 599.0  4/9/2021 Unknown                Review of Systems:   A comprehensive review of systems was negative except for that written in the HPI. Vital Signs:    Last 24hrs VS reviewed since prior progress note.  Most recent are:  Visit Vitals  BP (!) 123/55   Pulse (!) 52   Temp 98.9 °F (37.2 °C)   Resp 16   Wt 72.4 kg (159 lb 9.8 oz)   SpO2 99%   BMI 26.56 kg/m²       No intake or output data in the 24 hours ending 10/30/21 1213     Physical Examination:     I had a face to face encounter with this patient and independently examined them on 10/30/21 as outlined below:   PHYSICAL EXAMINATION:  GENERAL:  The patient looks alert, awake, oriented x3 and has a very flat affect. HEAD, EYES, EARS, NOSE, AND THROAT:  Pupils are equal and reactive to light. Oral mucosa was moist.  NECK:  Supple. LUNGS:  Clear. CARDIOVASCULAR:  S1 and S2 audible. No S3 or S4. ABDOMEN:  Soft, nontender. No guarding. No rebound. Bowel sounds were normoactive. EXTREMITIES:  Diffuse swelling was noted bilaterally which was positive. Redness was noted. Positive warmth was noted. Significant onychomycosis was noted in the toenails. Superficial abrasion, ulceration was noted. Data Review:    Review and/or order of clinical lab test      Labs:     Recent Labs     10/30/21  0228 10/29/21  1837   WBC 9.7 18.9*   HGB 8.4* 10.7*   HCT 25.4* 32.1*    346     Recent Labs     10/30/21  0228 10/29/21  1837   * 129*   K 3.7 4.8    102   CO2 19* 21   BUN 44* 47*   CREA 2.28* 2.30*   * 141*   CA 8.4* 9.7   MG 1.8  --      Recent Labs     10/30/21  0228 10/29/21  1837   ALT 16 16    123*   TBILI 0.3 0.5   TP 5.1* 6.8   ALB 2.0* 3.0*   GLOB 3.1 3.8     No results for input(s): INR, PTP, APTT, INREXT in the last 72 hours. No results for input(s): FE, TIBC, PSAT, FERR in the last 72 hours. Lab Results   Component Value Date/Time    Folate 19.7 03/31/2021 03:55 AM      No results for input(s): PH, PCO2, PO2 in the last 72 hours. No results for input(s): CPK, CKNDX, TROIQ in the last 72 hours.     No lab exists for component: CPKMB  Lab Results   Component Value Date/Time    Cholesterol, total 148 09/23/2020 04:27 AM    HDL Cholesterol 52 09/23/2020 04:27 AM    LDL, calculated 83.8 09/23/2020 04:27 AM    Triglyceride 61 09/23/2020 04:27 AM    CHOL/HDL Ratio 2.8 09/23/2020 04:27 AM     Lab Results   Component Value Date/Time    Glucose (POC) 120 (H) 04/24/2021 04:54 PM    Glucose (POC) 176 (H) 04/24/2021 11:16 AM    Glucose (POC) 111 (H) 04/24/2021 08:45 AM    Glucose (POC) 138 (H) 04/02/2021 11:45 AM    Glucose (POC) 107 (H) 04/01/2021 09:16 PM     Lab Results   Component Value Date/Time    Color YELLOW/STRAW 10/29/2021 07:16 PM    Appearance CLOUDY (A) 10/29/2021 07:16 PM    Specific gravity 1.009 10/29/2021 07:16 PM    pH (UA) 5.0 10/29/2021 07:16 PM    Protein 100 (A) 10/29/2021 07:16 PM    Glucose Negative 10/29/2021 07:16 PM    Ketone Negative 10/29/2021 07:16 PM    Bilirubin Negative 10/29/2021 07:16 PM    Urobilinogen 0.2 10/29/2021 07:16 PM    Nitrites Negative 10/29/2021 07:16 PM    Leukocyte Esterase MODERATE (A) 10/29/2021 07:16 PM    Epithelial cells MODERATE (A) 10/29/2021 07:16 PM    Bacteria 2+ (A) 10/29/2021 07:16 PM    WBC 20-50 10/29/2021 07:16 PM    RBC 0-5 10/29/2021 07:16 PM         Medications Reviewed:     Current Facility-Administered Medications   Medication Dose Route Frequency    piperacillin-tazobactam (ZOSYN) 3.375 g in 0.9% sodium chloride (MBP/ADV) 100 mL MBP  3.375 g IntraVENous Q12H    [START ON 10/31/2021] vancomycin (VANCOCIN) 500 mg in 0.9% sodium chloride (MBP/ADV) 100 mL MBP  500 mg IntraVENous Q24H    sodium chloride (NS) flush 5-10 mL  5-10 mL IntraVENous PRN    sodium chloride (NS) flush 5-40 mL  5-40 mL IntraVENous Q8H    sodium chloride (NS) flush 5-40 mL  5-40 mL IntraVENous PRN    acetaminophen (TYLENOL) tablet 650 mg  650 mg Oral Q6H PRN    Or    acetaminophen (TYLENOL) suppository 650 mg  650 mg Rectal Q6H PRN    polyethylene glycol (MIRALAX) packet 17 g  17 g Oral DAILY PRN    ondansetron (ZOFRAN ODT) tablet 4 mg  4 mg Oral Q8H PRN    Or    ondansetron (ZOFRAN) injection 4 mg  4 mg IntraVENous Q6H PRN    0.9% sodium chloride infusion  75 mL/hr IntraVENous CONTINUOUS    famotidine (PEPCID) tablet 20 mg  20 mg Oral BID    apixaban (ELIQUIS) tablet 2.5 mg  2.5 mg Oral BID    ARIPiprazole (ABILIFY) tablet 2 mg  2 mg Oral DAILY    atorvastatin (LIPITOR) tablet 40 mg  40 mg Oral QHS  busPIRone (BUSPAR) tablet 5 mg  5 mg Oral TID    carbidopa-levodopa (SINEMET)  mg per tablet 2 Tablet  2 Tablet Oral QID    clopidogreL (PLAVIX) tablet 75 mg  75 mg Oral DAILY AFTER BREAKFAST    DULoxetine (CYMBALTA) capsule 120 mg  120 mg Oral DAILY    gabapentin (NEURONTIN) capsule 100 mg  100 mg Oral QHS    melatonin tablet 6 mg  6 mg Oral QHS    therapeutic multivitamin (THERAGRAN) tablet 1 Tablet  1 Tablet Oral DAILY    nitroglycerin (NITROSTAT) tablet 0.4 mg  0.4 mg SubLINGual Q5MIN PRN    Vancomycin Pharmacy Dosing   Other Rx Dosing/Monitoring     ______________________________________________________________________  EXPECTED LENGTH OF STAY: - - -  ACTUAL LENGTH OF STAY:          1                 Vertell Gottron, MD

## 2021-10-30 NOTE — ED NOTES
Bedside and Verbal shift change report given to Hilaria Taylor RN (oncoming nurse) by Bandar Kuo RN (offgoing nurse). Report included the following information SBAR, Kardex, ED Summary, Intake/Output, MAR, Recent Results and Med Rec Status.

## 2021-10-30 NOTE — H&P
[address]  HISTORY AND PHYSICAL    Name:  Jessica Song  MR#:  [id number]  :  1940  ACCOUNT #:  [de-identified]  ADMIT DATE:  10/29/2021        HISTORY OF PRESENT ILLNESS:  The patient is an 71-year-old female extremely poor historian. On reviewing the chart, the patient has a history of hypertension, atrial fibrillation, currently on Eliquis for the same. The patient has not been able to take care of herself. Also the patient was sent by her power of  for generalized weakness, failure to thrive, and the patient was not feeling well. The patient upon arrival to the ER was found to have a temperature of 102 and was found to be hypotensive. The labs showed elevated WBC count of 18,000 and creatinine of 2.3. The patient's baseline creatinine is 1.9. The patient was not confused. The other labs that were ordered showed a CT scan which was negative. The urine showed significant urinary tract infection. The patient is currently being admitted for the management of this condition. On further questioning, the patient does complain of some swelling of the bilateral lower extremities which has recently been getting worse, does have a history of peripheral vascular disease, for which she has been taking Pletal for the same. Denies complaints of any chest pain or chest pressure or shortness of breath. Denies complaints of any nausea, vomiting, or diarrhea. Denies complaints of any urgency, frequency, burning of urination. Denies having any suicidal or homicidal ideations.     PAST MEDICAL HISTORY:  History of anxiety disorder, history of atrial fibrillation, history of coronary artery disease, status post stent, status post carotid stenosis, history of CKD, baseline creatinine of 1.9, current creatinine is 2.3, history of cervical stenosis of the spinal canal, history of CVA, history of questionable diabetes, currently not on any oral hypoglycemic agents, history of GERD, history of dyslipidemia, history of peptic ulcer disease, history of hypertension, status post carotid endarterectomy, CVA in 2007. PAST SURGICAL HISTORY:  Status post endarterectomy, status post coronary artery bypass, also has stents, right total knee replacement in 2015. FAMILY HISTORY:  Significant for brother had Parkinson's disease. Mother had coronary artery disease. SOCIAL HISTORY:  Lives alone. Five-pack years of smoking. Denies having any alcohol use. ALLERGIES:  NO KNOWN ALLERGIES. REVIEW OF SYSTEMS:  CONSTITUTIONAL:  Positive fevers. Positive chills. Positive generalized weakness. RESPIRATORY:  Negative shortness of breath. Negative wheezing. CARDIOVASCULAR:  Negative palpitations. Negative for chest pains. GASTROINTESTINAL:  Negative nausea. Negative vomiting. Positive abdominal discomfort. GENITOURINARY:  Negative dysuria. Negative hematuria. Negative frequency of urination. NEUROLOGICAL:  Positive for tremors. Other review of systems was negative other than mentioned in the HPI. PHYSICAL EXAMINATION:  GENERAL:  The patient looks alert, awake, oriented x3 and has a very flat affect. HEAD, EYES, EARS, NOSE, AND THROAT:  Pupils are equal and reactive to light. Oral mucosa was moist.  NECK:  Supple. LUNGS:  Clear. CARDIOVASCULAR:  S1 and S2 audible. No S3 or S4. ABDOMEN:  Soft, nontender. No guarding. No rebound. Bowel sounds were normoactive. EXTREMITIES:  Diffuse swelling was noted bilaterally which was positive. Redness was noted. Positive warmth was noted. Significant onychomycosis was noted in the toenails. Superficial abrasion, ulceration was noted. LABORATORY DATA:  The patient's labs showed sodium of 129, potassium of 4.8, BUN of 47, creatinine of 2.3. Liver enzymes were normal.  Lactic acid was 1.1 within normal limits. CBC showed WBC count of 18.9, hemoglobin of 10.7, same as the baseline, hematocrit of 32.1, platelets of 604.     SUMMARY: An 25-year-old female with a history of hypertension, coronary artery disease, status post CABG and stent, atrial fibrillation, currently on Eliquis for the same, presents with fevers and chills, and temperature of 102 with initial hypotension, found to have urinary tract infection, also clinically was found to have bilateral lower extremity cellulitis. ASSESSMENT AND PLAN:  1. Fevers and leukocytosis, most likely secondary to combination of bilateral lower extremity cellulitis. The patient will be started on broad-spectrum antibiotics Zosyn and vancomycin. Monitor the condition closely. We will get Wound consult. 2.  Acute-on-chronic renal failure secondary to systemic inflammatory response syndrome. We will do gentle hydration, monitor for now. 3.  Atrial fibrillation, currently on Eliquis, will be continued for now. 4.  History of stroke, stable. Monitor the condition for now. 5.  History of peptic ulcer disease. We will continue with Pepcid. No signs of gastrointestinal bleed. 6.  History of depression, anxiety. Continue with Abilify for now. 7.  Dyslipidemia. Continue with Lipitor. 8.  Hypertension. Hold off antihypertensive medications for now. 9.  Status post coronary artery bypass grafting and stent. Continue with Plavix. 10.  Code status:  Full code. 11.  Deep venous thrombosis prophylaxis. The patient is currently on Eliquis. Hold off Lovenox and heparin.         Chris Sanders MD      [id number]  D:  10/29/2021 22:29  T:  10/30/2021 1:57  JOB #:  [id number]

## 2021-10-30 NOTE — PROGRESS NOTES
Pharmacist Note - Vancomycin Dosing    Consult provided for this [de-identified] y.o. female for indication of SSTI. Antibiotic regimen(s): Zosyn and Vancomycin  Patient on vancomycin PTA? no    Recent Labs     10/30/21  0228 10/29/21  1837   WBC 9.7 18.9*   CREA 2.28* 2.30*   BUN 44* 47*     Frequency of BMP: daily  Height: 165 cm  Weight: 72 kg  Est CrCl: 20 ml/min  Temp (24hrs), Av.7 °F (37.6 °C), Min:98.4 °F (36.9 °C), Max:102.4 °F (39.1 °C)    Cultures:      MRSA Swab ordered (if applicable)? N/A    The plan below is expected to result in a target range of AUC/ANIVAL 400-500    Therapy will be initiated with a loading dose of 1750 mg IV x 1 to be followed by a maintenance dose of 500 mg IV every 24 hours. Pharmacy to follow patient daily and order levels / make dose adjustments as appropriate.

## 2021-10-30 NOTE — ED NOTES
TRANSFER - OUT REPORT:    Verbal report given to Lizeth Price RN(name) on [employer]  being transferred to 6S(unit) for routine progression of care       Report consisted of patients Situation, Background, Assessment and   Recommendations(SBAR). Information from the following report(s) SBAR, Kardex, ED Summary, Intake/Output, MAR, Recent Results, Med Rec Status and Cardiac Rhythm A Flutter was reviewed with the receiving nurse. Lines:   Peripheral IV 10/29/21 Right Antecubital (Active)        Opportunity for questions and clarification was provided.

## 2021-10-30 NOTE — CONSULTS
Nephrology Consult Note     [address]     www. [facility]              Phone - [phone]   Patient: Jessica Loera   YOB: 1940    Date- 10/30/2021  MRN: [id number]             REASON FOR CONSULTATION: MORENO ON CKD   CONSULTING PHYSICIAN: DR. REDDY    ADMIT DATE:10/29/2021 PATIENT PCP:Sahil Arce DO     IMPRESSION & PLAN:     MORENO due to pre renal factors +/- ATN due to low bp-- bumex use   Hyponatremia-- likely due to dehydration   ckd 3 a- cr 2.0 in august 2021---cr 1.4 in august 2021   Hypertension   pvd   S/p left CEA   Renal artery dis-    H/o CHF   CABG 2015   H/O AAA    PLAN-  · Continue ivf  · Check urine lytes  · Daily bmp  · Hold norvasc  · Check iron study  · Check renal usg  · Hold bumex  · Avoid NSAIDS     · Active Problems:  ·   UTI (urinary tract infection) (4/9/2021)  ·   ·   Cellulitis and abscess of lower extremity (10/29/2021)  ·   ·     [x] High complexity decision making was performed  [x] Patient is at high-risk of decompensation with multiple organ involvement    Subjective:   HPI: Jessica Loera is a [de-identified] y.o.  female. She came to er with generalized weakness. She has pmh of ckd, htn, afib. She lives alone  She is on bumex at home  She reports poor po intake  Her sbp low in er  She denies vomiting or diarrhea  No recent abx use  She denies taking NSAIDS  She is on norvasc, hydralazine , coreg for htn      Review of Systems:       A 11 point review of system was performed today. Pertinent positives and negatives are mentioned in the HPI. The reminder of the ROS is negative and noncontributory.     Past Medical History:   Diagnosis Date    Anxiety disorder     Atrial fibrillation (HCC)     CAD (coronary artery disease) 2007    stents, CABG x 3v    Carotid stenosis     Cervical stenosis of spinal canal 07/2019    Chronic kidney disease     Cough     CVA (cerebral vascular accident) ([facility] [address] 07/2019    left lacunar infarct at head of caudate    Depression     AND CHRONIC ANXIETY    Diabetes ([facility] [address]     GERD (gastroesophageal reflux disease)     High cholesterol     History of peptic ulcer     Bleeding ulcer with increased NSAID use    Hypertension     Left carotid stenosis 07/2019    s/p left CEA with Dr. Sandra Tirado, old 2007    PUD (peptic ulcer disease)     Stroke ([facility] [address]     Tremor     Valvular heart disease       Past Surgical History:   Procedure Laterality Date    HX CAROTID ENDARTERECTOMY  07/2019    HX CORONARY ARTERY BYPASS GRAFT      HX TONSILLECTOMY  1963    [place] CARDIAC SURG PROCEDURE UNLIST      CABG X3 VESSEL    [place] TOTAL KNEE ARTHROPLASTY Right 2015      Prior to Admission medications    Medication Sig Start Date End Date Taking? Authorizing Provider   ARIPiprazole (ABILIFY) 20 mg tablet  8/18/21   Provider, Historical   traMADoL (ULTRAM) 50 mg tablet as needed. 8/6/21   Provider, Historical   cilostazoL (PLETAL) 100 mg tablet Take  by mouth Before breakfast and dinner. Provider, Historical   apixaban (ELIQUIS) 2.5 mg tablet Take 1 Tablet by mouth two (2) times a day. 8/26/21   Shelley Mccartney MD   bumetanide (BUMEX) 1 mg tablet Take 1 Tablet by mouth daily. 8/6/21   Shelley Mccartney MD   amLODIPine (NORVASC) 2.5 mg tablet Take 1 Tablet by mouth daily. 7/16/21   Shelley Mccartney MD   hydrALAZINE (APRESOLINE) 50 mg tablet Take 1 Tablet by mouth three (3) times daily. 7/16/21   Shelley Mccartney MD   atorvastatin (LIPITOR) 40 mg tablet Take 1 Tablet by mouth nightly. 7/16/21   Shelley Mccartney MD   carvediloL (COREG) 3.125 mg tablet Take 1 Tablet by mouth two (2) times daily (with meals). 7/16/21   Shelley Mccartney MD   busPIRone (BUSPAR) 5 mg tablet Take 1 Tab by mouth three (3) times daily. 4/29/21   Ashley Araya MD   gabapentin (NEURONTIN) 100 mg capsule Take 100 mg by mouth nightly.   Patient not taking: Reported on 8/26/2021    Provider, Historical   melatonin 3 mg tablet Take 6 mg by mouth nightly. Patient not taking: Reported on 7/16/2021    Provider, Historical   potassium chloride SR (KLOR-CON 10) 10 mEq tablet Take 20 mEq by mouth daily. Provider, Historical   nitroglycerin (Nitrostat) 0.4 mg SL tablet 0.4 mg by SubLINGual route every five (5) minutes as needed for Chest Pain. Up to 3 doses. Provider, Historical   clopidogreL (Plavix) 75 mg tab Take 75 mg by mouth daily (after breakfast). Patient not taking: Reported on 8/26/2021    Provider, Historical   carbidopa-levodopa (SINEMET)  mg per tablet Take 2 Tabs by mouth four (4) times daily. 2/4/20   Dino Aceves MD   acetaminophen (TYLENOL) 325 mg tablet Take 650 mg by mouth every six (6) hours as needed for Pain or Fever. Provider, Historical   cyanocobalamin (VITAMIN B12) 100 mcg tablet Take 100 mcg by mouth daily. Patient not taking: Reported on 7/16/2021    Provider, Historical   vit C,Y-Du-efwmy-lutein-zeaxan (PRESERVISION AREDS-2) 813-542-70-1 mg-unit-mg-mg cap capsule Take 1 Cap by mouth two (2) times a day. Patient not taking: Reported on 7/16/2021    Provider, Historical   senna-docusate (SENNA WITH DOCUSATE SODIUM) 8.6-50 mg per tablet Take 1 Tab by mouth nightly. Patient not taking: Reported on 8/26/2021    Provider, Historical   Cholecalciferol, Vitamin D3, 1,000 unit cap Take 1,000 Units by mouth daily. Patient not taking: Reported on 8/26/2021    Provider, Historical   pantoprazole (PROTONIX) 40 mg tablet Take 40 mg by mouth Daily (before breakfast). Indications: h/o bleeding ulcer with nsaid  Patient not taking: Reported on 8/26/2021    Provider, Historical   DULoxetine (CYMBALTA) 60 mg capsule Take 120 mg by mouth daily. Indications: Anxiousness associated with Depression  Patient not taking: Reported on 8/26/2021    Provider, Historical   multivitamins-minerals-lutein (CENTRUM SILVER) tab tablet Take 1 Tab by mouth daily.   Patient not taking: Reported on 7/16/2021    Provider, Historical     No Known Allergies   Social History     Tobacco Use    Smoking status: Former Smoker     Packs/day: 0.25     Years: 5.00     Pack years: 1.25     Types: Cigarettes     Quit date:      Years since quittin.8    Smokeless tobacco: Never Used   Substance Use Topics    Alcohol use: Not Currently     Comment: Rare      Family History   Problem Relation Age of Onset    Heart Attack Mother 72        Dec 89yo    Hypertension Mother     Other Father         Unknown    Parkinson's Disease Brother     Anesth Problems Neg Hx         Objective:      Patient Vitals for the past 24 hrs:   Temp Pulse Resp BP SpO2   10/30/21 1000 98.9 °F (37.2 °C) (!) 58 16 (!) 123/55 99 %   10/30/21 0800     99 %   10/30/21 0606 98.5 °F (36.9 °C) (!) 54 14 (!) 127/48 98 %   10/30/21 0601  (!) 54      10/30/21 0402  (!) 51      10/30/21 0216 98.4 °F (36.9 °C) (!) 53 16 (!) 112/37 97 %   10/30/21 0153  63      10/30/21 0026 98.4 °F (36.9 °C) 64 17 (!) 120/41 100 %   10/29/21 2300  60 17 (!) 127/47 98 %   10/29/21 2130  71 14 (!) 110/50 96 %   10/29/21 2020  76 17 110/66 96 %   10/29/21 2015  84 16  97 %   10/29/21 2000  66 14 (!) 94/49 97 %   10/29/21 1947  67 14 110/87 95 %   10/29/21 1915  72 18 133/61 98 %   10/29/21 1900  84 16 (!) 159/59 95 %   10/29/21 1845  79 19 (!) 145/70 98 %   10/29/21 1834  77 16 136/76 100 %   10/29/21 1831 (!) 102.4 °F (39.1 °C) 76 20 (!) 140/62 97 %   10/29/21 1830  72 17  97 %     No intake/output data recorded.   Last 3 Recorded Weights in this Encounter    10/29/21 1831   Weight: 72.4 kg (159 lb 9.8 oz)      Physical Exam:  General:Alert, No distress,   Eyes:No scleral icterus, No conjunctival pallor  Neck:Supple,no mass palpable,no thyromegaly  Lungs:Clears to auscultation Bilaterally, normal respiratory effort  CVS:RRR, S1 S2 normal,  No rub,  Abdomen:Soft, Non tender, No hepatosplenomegaly  Extremities: +++ LE edema  Skin:No rash or lesions, Warm and DRY   Psych: normal mood  :  No pittman  Musculoskeletal : no redness, no joint tenderness  NEURO: Normal Speech, Non focal        CODE STATUS:  full  Care Plan discussed with:  patient     Chart reviewed.    y Reviewed previous records   y Discussion with patient and/or family and questions answered       ECG[de-identified] Rev:yes  Xray/CT/US/MRI REV:yes  Lab Data Personally Reviewed: (see below)  Recent Labs     10/30/21  0228 10/29/21  1837   WBC 9.7 18.9*   HGB 8.4* 10.7*    346   ANEU 7.9 17.0*   * 129*   K 3.7 4.8   * 141*   BUN 44* 47*   CREA 2.28* 2.30*   ALT 16 16   TBILI 0.3 0.5    123*   CA 8.4* 9.7   MG 1.8  --      Lab Results   Component Value Date/Time    Color YELLOW/STRAW 10/29/2021 07:16 PM    Appearance CLOUDY (A) 10/29/2021 07:16 PM    Specific gravity 1.009 10/29/2021 07:16 PM    pH (UA) 5.0 10/29/2021 07:16 PM    Protein 100 (A) 10/29/2021 07:16 PM    Glucose Negative 10/29/2021 07:16 PM    Ketone Negative 10/29/2021 07:16 PM    Bilirubin Negative 10/29/2021 07:16 PM    Urobilinogen 0.2 10/29/2021 07:16 PM    Nitrites Negative 10/29/2021 07:16 PM    Leukocyte Esterase MODERATE (A) 10/29/2021 07:16 PM    Epithelial cells MODERATE (A) 10/29/2021 07:16 PM    Bacteria 2+ (A) 10/29/2021 07:16 PM    WBC 20-50 10/29/2021 07:16 PM    RBC 0-5 10/29/2021 07:16 PM       Lab Results   Component Value Date/Time    Iron 55 03/31/2021 03:55 AM    TIBC 280 03/31/2021 03:55 AM    Iron % saturation 20 03/31/2021 03:55 AM    Ferritin 119 03/31/2021 03:55 AM     Lab Results   Component Value Date/Time    Culture result: NO GROWTH AFTER 9 HOURS 10/29/2021 06:37 PM    Culture result: NO GROWTH AFTER 9 HOURS 10/29/2021 06:37 PM    Culture result: NO GROWTH 5 DAYS 04/23/2021 06:40 AM     Prior to Admission Medications   Prescriptions Last Dose Informant Patient Reported? Taking? ARIPiprazole (ABILIFY) 20 mg tablet   Yes No   Cholecalciferol, Vitamin D3, 1,000 unit cap   Yes No   Sig: Take 1,000 Units by mouth daily. Patient not taking: Reported on 2021   DULoxetine (CYMBALTA) 60 mg capsule   Yes No   Sig: Take 120 mg by mouth daily. Indications: Anxiousness associated with Depression   Patient not taking: Reported on 2021   acetaminophen (TYLENOL) 325 mg tablet   Yes No   Sig: Take 650 mg by mouth every six (6) hours as needed for Pain or Fever. amLODIPine (NORVASC) 2.5 mg tablet   No No   Sig: Take 1 Tablet by mouth daily. apixaban (ELIQUIS) 2.5 mg tablet   No No   Sig: Take 1 Tablet by mouth two (2) times a day. atorvastatin (LIPITOR) 40 mg tablet   No No   Sig: Take 1 Tablet by mouth nightly. bumetanide (BUMEX) 1 mg tablet   No No   Sig: Take 1 Tablet by mouth daily. busPIRone (BUSPAR) 5 mg tablet   No No   Sig: Take 1 Tab by mouth three (3) times daily. carbidopa-levodopa (SINEMET)  mg per tablet   No No   Sig: Take 2 Tabs by mouth four (4) times daily. carvediloL (COREG) 3.125 mg tablet   No No   Sig: Take 1 Tablet by mouth two (2) times daily (with meals). cilostazoL (PLETAL) 100 mg tablet   Yes No   Sig: Take  by mouth Before breakfast and dinner. clopidogreL (Plavix) 75 mg tab   Yes No   Sig: Take 75 mg by mouth daily (after breakfast). Patient not taking: Reported on 2021   cyanocobalamin (VITAMIN B12) 100 mcg tablet   Yes No   Sig: Take 100 mcg by mouth daily. Patient not taking: Reported on 2021   gabapentin (NEURONTIN) 100 mg capsule   Yes No   Sig: Take 100 mg by mouth nightly. Patient not taking: Reported on 2021   hydrALAZINE (APRESOLINE) 50 mg tablet   No No   Sig: Take 1 Tablet by mouth three (3) times daily. melatonin 3 mg tablet   Yes No   Sig: Take 6 mg by mouth nightly. Patient not taking: Reported on 2021   multivitamins-minerals-lutein (CENTRUM SILVER) tab tablet   Yes No   Sig: Take 1 Tab by mouth daily.    Patient not taking: Reported on 2021   nitroglycerin (Nitrostat) 0.4 mg SL tablet   Yes No   Si.4 mg by SubLINGual route every five (5) minutes as needed for Chest Pain. Up to 3 doses. pantoprazole (PROTONIX) 40 mg tablet   Yes No   Sig: Take 40 mg by mouth Daily (before breakfast). Indications: h/o bleeding ulcer with nsaid   Patient not taking: Reported on 8/26/2021   potassium chloride SR (KLOR-CON 10) 10 mEq tablet   Yes No   Sig: Take 20 mEq by mouth daily. senna-docusate (SENNA WITH DOCUSATE SODIUM) 8.6-50 mg per tablet   Yes No   Sig: Take 1 Tab by mouth nightly. Patient not taking: Reported on 8/26/2021   traMADoL (ULTRAM) 50 mg tablet   Yes No   Sig: as needed. vit C,O-Nr-tfamz-lutein-zeaxan (PRESERVISION AREDS-2) 195-825-77-1 mg-unit-mg-mg cap capsule   Yes No   Sig: Take 1 Cap by mouth two (2) times a day. Patient not taking: Reported on 7/16/2021      Facility-Administered Medications: None     Imaging:    Medications list Personally Reviewed   [x]      Yes     []               No    Thank you for allowing us to participate in the care this patient. We will follow patient with you. Signed By: Alexsander Mcintosh MD  [facility]  [facility] [address]  [place], [address]  Phone - [phone]         Fax - [phone] [facility] Office  [address] [place] [address]  Phone - [phone]        Fax - [phone]     www. [facility]

## 2021-10-30 NOTE — PROGRESS NOTES
Day #1 of Zosyn  Indication:  intra-abdominal infection  Current regimen:  2.25 grams q8h  Abx regimen: Vancomycin and Zosyn  Recent Labs     10/29/21  1837   WBC 18.9*   CREA 2.30*   BUN 47*     Est CrCl: 20 ml/min  Temp (24hrs), Av.7 °F (37.6 °C), Min:98.4 °F (36.9 °C), Max:102.4 °F (39.1 °C)    Cultures: blood and urine    Plan: Change to 3.375 grams q12h

## 2021-10-31 LAB
ALBUMIN SERPL-MCNC: 2 G/DL (ref 3.5–5)
ANION GAP SERPL CALC-SCNC: 5 MMOL/L (ref 5–15)
ATRIAL RATE: 69 BPM
BUN SERPL-MCNC: 39 MG/DL (ref 6–20)
BUN/CREAT SERPL: 21 (ref 12–20)
CALCIUM SERPL-MCNC: 8.4 MG/DL (ref 8.5–10.1)
CALCULATED P AXIS, ECG09: 76 DEGREES
CALCULATED R AXIS, ECG10: -56 DEGREES
CALCULATED T AXIS, ECG11: 54 DEGREES
CHLORIDE SERPL-SCNC: 113 MMOL/L (ref 97–108)
CO2 SERPL-SCNC: 20 MMOL/L (ref 21–32)
CREAT SERPL-MCNC: 1.89 MG/DL (ref 0.55–1.02)
DIAGNOSIS, 93000: NORMAL
FERRITIN SERPL-MCNC: 136 NG/ML (ref 26–388)
GLUCOSE SERPL-MCNC: 145 MG/DL (ref 65–100)
HCT VFR BLD AUTO: 28.2 % (ref 35–47)
HGB BLD-MCNC: 9 G/DL (ref 11.5–16)
IRON SATN MFR SERPL: 9 % (ref 20–50)
IRON SERPL-MCNC: 13 UG/DL (ref 35–150)
P-R INTERVAL, ECG05: 144 MS
PHOSPHATE SERPL-MCNC: 3.1 MG/DL (ref 2.6–4.7)
POTASSIUM SERPL-SCNC: 4.3 MMOL/L (ref 3.5–5.1)
Q-T INTERVAL, ECG07: 382 MS
QRS DURATION, ECG06: 90 MS
QTC CALCULATION (BEZET), ECG08: 409 MS
SODIUM SERPL-SCNC: 138 MMOL/L (ref 136–145)
TIBC SERPL-MCNC: 140 UG/DL (ref 250–450)
VENTRICULAR RATE, ECG03: 69 BPM

## 2021-10-31 PROCEDURE — 74011000258 HC RX REV CODE- 258: Performed by: INTERNAL MEDICINE

## 2021-10-31 PROCEDURE — 82728 ASSAY OF FERRITIN: CPT

## 2021-10-31 PROCEDURE — 85018 HEMOGLOBIN: CPT

## 2021-10-31 PROCEDURE — 83540 ASSAY OF IRON: CPT

## 2021-10-31 PROCEDURE — 74011250636 HC RX REV CODE- 250/636: Performed by: INTERNAL MEDICINE

## 2021-10-31 PROCEDURE — 74011250637 HC RX REV CODE- 250/637: Performed by: INTERNAL MEDICINE

## 2021-10-31 PROCEDURE — 77030038269 HC DRN EXT URIN PURWCK BARD -A

## 2021-10-31 PROCEDURE — 80069 RENAL FUNCTION PANEL: CPT

## 2021-10-31 PROCEDURE — 36415 COLL VENOUS BLD VENIPUNCTURE: CPT

## 2021-10-31 PROCEDURE — 65660000000 HC RM CCU STEPDOWN

## 2021-10-31 RX ORDER — HYDRALAZINE HYDROCHLORIDE 20 MG/ML
10 INJECTION INTRAMUSCULAR; INTRAVENOUS
Status: DISCONTINUED | OUTPATIENT
Start: 2021-10-31 | End: 2021-10-31

## 2021-10-31 RX ORDER — HYDRALAZINE HYDROCHLORIDE 25 MG/1
25 TABLET, FILM COATED ORAL EVERY 8 HOURS
Status: DISCONTINUED | OUTPATIENT
Start: 2021-10-31 | End: 2021-11-01

## 2021-10-31 RX ORDER — HYDRALAZINE HYDROCHLORIDE 20 MG/ML
20 INJECTION INTRAMUSCULAR; INTRAVENOUS
Status: DISCONTINUED | OUTPATIENT
Start: 2021-10-31 | End: 2021-11-03 | Stop reason: HOSPADM

## 2021-10-31 RX ORDER — PANTOPRAZOLE SODIUM 40 MG/1
40 TABLET, DELAYED RELEASE ORAL
Status: DISCONTINUED | OUTPATIENT
Start: 2021-10-31 | End: 2021-11-03 | Stop reason: HOSPADM

## 2021-10-31 RX ADMIN — PANTOPRAZOLE SODIUM 40 MG: 40 TABLET, DELAYED RELEASE ORAL at 16:22

## 2021-10-31 RX ADMIN — PIPERACILLIN AND TAZOBACTAM 3.38 G: 3; .375 INJECTION, POWDER, LYOPHILIZED, FOR SOLUTION INTRAVENOUS at 18:34

## 2021-10-31 RX ADMIN — BUSPIRONE HYDROCHLORIDE 5 MG: 5 TABLET ORAL at 08:36

## 2021-10-31 RX ADMIN — CLOPIDOGREL BISULFATE 75 MG: 75 TABLET ORAL at 08:36

## 2021-10-31 RX ADMIN — VANCOMYCIN HYDROCHLORIDE 500 MG: 500 INJECTION, POWDER, LYOPHILIZED, FOR SOLUTION INTRAVENOUS at 03:27

## 2021-10-31 RX ADMIN — ONDANSETRON 4 MG: 2 INJECTION INTRAMUSCULAR; INTRAVENOUS at 17:38

## 2021-10-31 RX ADMIN — SODIUM CHLORIDE 75 ML/HR: 9 INJECTION, SOLUTION INTRAVENOUS at 18:33

## 2021-10-31 RX ADMIN — CARBIDOPA AND LEVODOPA 2 TABLET: 25; 100 TABLET ORAL at 08:37

## 2021-10-31 RX ADMIN — PIPERACILLIN AND TAZOBACTAM 3.38 G: 3; .375 INJECTION, POWDER, LYOPHILIZED, FOR SOLUTION INTRAVENOUS at 10:19

## 2021-10-31 RX ADMIN — APIXABAN 2.5 MG: 2.5 TABLET, FILM COATED ORAL at 17:59

## 2021-10-31 RX ADMIN — IRON SUCROSE 200 MG: 20 INJECTION, SOLUTION INTRAVENOUS at 14:32

## 2021-10-31 RX ADMIN — CARBIDOPA AND LEVODOPA 2 TABLET: 25; 100 TABLET ORAL at 21:12

## 2021-10-31 RX ADMIN — FAMOTIDINE 20 MG: 20 TABLET, FILM COATED ORAL at 08:37

## 2021-10-31 RX ADMIN — HYDRALAZINE HYDROCHLORIDE 10 MG: 20 INJECTION INTRAMUSCULAR; INTRAVENOUS at 14:34

## 2021-10-31 RX ADMIN — BUSPIRONE HYDROCHLORIDE 5 MG: 5 TABLET ORAL at 21:12

## 2021-10-31 RX ADMIN — ATORVASTATIN CALCIUM 40 MG: 40 TABLET, FILM COATED ORAL at 21:11

## 2021-10-31 RX ADMIN — PANTOPRAZOLE SODIUM 40 MG: 40 TABLET, DELAYED RELEASE ORAL at 10:19

## 2021-10-31 RX ADMIN — ARIPIPRAZOLE 2 MG: 2 TABLET ORAL at 08:53

## 2021-10-31 RX ADMIN — CARBIDOPA AND LEVODOPA 2 TABLET: 25; 100 TABLET ORAL at 17:59

## 2021-10-31 RX ADMIN — BUSPIRONE HYDROCHLORIDE 5 MG: 5 TABLET ORAL at 16:22

## 2021-10-31 RX ADMIN — HYDRALAZINE HYDROCHLORIDE 20 MG: 20 INJECTION INTRAMUSCULAR; INTRAVENOUS at 18:34

## 2021-10-31 RX ADMIN — CARBIDOPA AND LEVODOPA 2 TABLET: 25; 100 TABLET ORAL at 12:35

## 2021-10-31 RX ADMIN — PIPERACILLIN AND TAZOBACTAM 3.38 G: 3; .375 INJECTION, POWDER, LYOPHILIZED, FOR SOLUTION INTRAVENOUS at 01:00

## 2021-10-31 RX ADMIN — APIXABAN 2.5 MG: 2.5 TABLET, FILM COATED ORAL at 08:37

## 2021-10-31 RX ADMIN — THERA TABS 1 TABLET: TAB at 08:36

## 2021-10-31 RX ADMIN — ACETAMINOPHEN 650 MG: 325 TABLET ORAL at 17:38

## 2021-10-31 NOTE — PROGRESS NOTES
Aaron served Dr. [place] Rogers regarding patient's SBP- She changed hydralazine order to 20 mg IV Q6 hrs

## 2021-10-31 NOTE — PROGRESS NOTES
Patient had BM that was maroon with luz marina blood. Messaged Dr. Elise Burns regarding occult stool. As per provider, monitor with lab trends. No occult stool needed at this time.

## 2021-10-31 NOTE — PROGRESS NOTES
Renal Monitoring of pip-tazo  Indication:  sepsis  Current regimen:  pip-tazo 3.375g q12h    Recent Labs     10/31/21  0103 10/30/21  0228 10/29/21  1837   WBC  --  9.7 18.9*   CREA 1.89* 2.28* 2.30*   BUN 39* 44* 47*     Est CrCl: 20-25 ml/min; Temp (24hrs), Av.4 °F (36.9 °C), Min:98.3 °F (36.8 °C), Max:98.7 °F (37.1 °C)      Plan: Change to pip-tazo 3.375g q8h given improved renal function from previous.

## 2021-10-31 NOTE — PROGRESS NOTES
Nephrology Progress Note  [address] [facility][note 3]     www. [email or web address]  Phone - [phone]   Patient: Dionte Lucio    YOB: 1940        Date- 10/31/2021   Admit Date: 10/29/2021  CC: Follow up for cristhian          IMPRESSION & PLAN:   · CRISTHIAN  due to pre renal factors +/- ATN due to low bp-- bumex use  · Hyponatremia-- likely due to dehydration  · ckd 3 a- cr 2.0 in august 2021---cr 1.4 in august 2021  · Hypertension  · pvd  · Iron defi anemia  · S/p left CEA  · Renal artery dis-   · H/o CHF  · CABG 2015   H/O AAA    PLAN-   Continue ivf   Iv iron   Follow bmp     Subjective: Interval History:   -  Cr improved  Iron sats low   bp stable    Objective:   Vitals:    10/31/21 0946 10/31/21 1000 10/31/21 1200 10/31/21 1348   BP: (!) 146/49      Pulse: (!) 59 (!) 58 (!) 48 (!) 58   Resp: 22   16   Temp: 98.3 °F (36.8 °C)   99.1 °F (37.3 °C)   SpO2: 97%   94%   Weight:          No intake/output data recorded. Last 3 Recorded Weights in this Encounter    10/29/21 1831 10/31/21 0828   Weight: 72.4 kg (159 lb 9.8 oz) 66.4 kg (146 lb 6.2 oz)      Physical exam:   GEN: NAD  NECK- Supple, no mass  RESP: No wheezing, Clear b/l  CVS: S1,S2  RRR  NEURO: Normal speech, Non focal  PSYCH: Normal Mood    Chart reviewed. Pertinent Notes reviewed.      Data Review :  Recent Labs     10/31/21  0103 10/30/21  0228 10/29/21  1837    134* 129*   K 4.3 3.7 4.8   * 107 102   CO2 20* 19* 21   BUN 39* 44* 47*   CREA 1.89* 2.28* 2.30*   * 230* 141*   CA 8.4* 8.4* 9.7   MG  --  1.8  --    PHOS 3.1  --   --      Recent Labs     10/31/21  1142 10/30/21  0228 10/29/21  1837   WBC  --  9.7 18.9*   HGB 9.0* 8.4* 10.7*   HCT 28.2* 25.4* 32.1*   PLT  --  243 346     Recent Labs     10/31/21  0103   TIBC 140*   PSAT 9*   FERR 136      Medication list  reviewed  Current Facility-Administered Medications   Medication Dose Route Frequency    hydrALAZINE (APRESOLINE) 20 mg/mL injection 10 mg  10 mg IntraVENous Q6H PRN    pantoprazole (PROTONIX) tablet 40 mg  40 mg Oral ACB&D    vancomycin (VANCOCIN) 750 mg in 0.9% sodium chloride 250 mL (VIAL-MATE)  750 mg IntraVENous Q24H    piperacillin-tazobactam (ZOSYN) 3.375 g in 0.9% sodium chloride (MBP/ADV) 100 mL MBP  3.375 g IntraVENous Q8H    sodium chloride (NS) flush 5-10 mL  5-10 mL IntraVENous PRN    sodium chloride (NS) flush 5-40 mL  5-40 mL IntraVENous Q8H    sodium chloride (NS) flush 5-40 mL  5-40 mL IntraVENous PRN    acetaminophen (TYLENOL) tablet 650 mg  650 mg Oral Q6H PRN    Or    acetaminophen (TYLENOL) suppository 650 mg  650 mg Rectal Q6H PRN    polyethylene glycol (MIRALAX) packet 17 g  17 g Oral DAILY PRN    ondansetron (ZOFRAN ODT) tablet 4 mg  4 mg Oral Q8H PRN    Or    ondansetron (ZOFRAN) injection 4 mg  4 mg IntraVENous Q6H PRN    0.9% sodium chloride infusion  75 mL/hr IntraVENous CONTINUOUS    apixaban (ELIQUIS) tablet 2.5 mg  2.5 mg Oral BID    ARIPiprazole (ABILIFY) tablet 2 mg  2 mg Oral DAILY    atorvastatin (LIPITOR) tablet 40 mg  40 mg Oral QHS    busPIRone (BUSPAR) tablet 5 mg  5 mg Oral TID    carbidopa-levodopa (SINEMET)  mg per tablet 2 Tablet  2 Tablet Oral QID    clopidogreL (PLAVIX) tablet 75 mg  75 mg Oral DAILY AFTER BREAKFAST    [Held by provider] melatonin tablet 6 mg  6 mg Oral QHS    therapeutic multivitamin (THERAGRAN) tablet 1 Tablet  1 Tablet Oral DAILY    nitroglycerin (NITROSTAT) tablet 0.4 mg  0.4 mg SubLINGual Q5MIN PRN    Vancomycin Pharmacy Dosing   Other Rx Dosing/Monitoring          Tom Warren MD              [facility]  [facility] [place] [address], [facility], [address]  Phone - [phone]               Fax - [phone]

## 2021-10-31 NOTE — PROGRESS NOTES
Day #2 of Vancomycin  Indication:  sepsis 2/2 UTI v SSTI  Current regimen:  vanc 500 mg q24h  Abx regimen:  vanc/pip-tazo  ID Following ?: NO  Concomitant nephrotoxic drugs (requires more frequent monitoring): None  Frequency of BMP?: daily    Recent Labs     10/31/21  0103 10/30/21  0228 10/29/21  1837   WBC  --  9.7 18.9*   CREA 1.89* 2.28* 2.30*   BUN 39* 44* 47*     Est CrCl: 20-25 ml/min; UO: -- ml/kg/hr  Temp (24hrs), Av.4 °F (36.9 °C), Min:98.3 °F (36.8 °C), Max:98.7 °F (37.1 °C)    Cultures:   10/29 urine: 50k cfu GNRs; prelim  -UA: (+)ve pyuria, (+)ve bacteria, MOD epis  10/29 blood; NG x2 days; prelim    MRSA Swab ordered (if applicable)? N/A    Goal target range AUC/ANIVAL 400-500    Recent level history:  Date/Time Dose & Interval Measured Level (mcg/mL) Associated AUC/ANIVAL Dose Adjustment                                                   Plan: Change to vanc 750 mg q24h given improvement in renal function. Estimated AUC/ANIVAL ~539 with current renal function, except that it will continue to improve. Will re-assess with random level ~qweekly or sooner if clinically indicated.      Leanna Betancur, [address] [facility]

## 2021-10-31 NOTE — PROGRESS NOTES
[facility]  Hospitalist Group                                                                                          Hospitalist Progress Note  Beto Damian MD covering 10/31/2021  Answering service: [phone] or [phone] from in house phone        Date of Service:  10/31/2021  NAME:  Jessica Loera  :  1940  MRN:  [id number]        Admission Summary:   \"an 30-year-old female extremely poor historian. On reviewing the chart, the patient has a history of hypertension, atrial fibrillation, currently on Eliquis for the same. The patient has not been able to take care of herself. Also the patient was sent by her power of  for generalized weakness, failure to thrive, and the patient was not feeling well. The patient upon arrival to the ER was found to have a temperature of 102 and was found to be hypotensive. The labs showed elevated WBC count of 18,000 and creatinine of 2.3. The patient's baseline creatinine is 1.9. The patient was not confused. The other labs that were ordered showed a CT scan which was negative. The urine showed significant urinary tract infection. The patient is currently being admitted for the management of this condition. On further questioning, the patient does complain of some swelling of the bilateral lower extremities which has recently been getting worse, does have a history of peripheral vascular disease, for which she has been taking Pletal for the same. Denies complaints of any chest pain or chest pressure or shortness of breath. Denies complaints of any nausea, vomiting, or diarrhea. Denies complaints of any urgency, frequency, burning of urination. continues to have low mood. Denies having any suicidal or homicidal ideations. \"       Interval history / Subjective:   Awake, she states that her mild hand tremor is from her Parkinsons     Assessment & Plan:     Sepsis   Likely coming from UTI but has noted areas of LE that is wrapped  On vancomycin and zosyn   Urine culture gram negative rods thus far  Blood culture negative growth thus far  Resume current broad spectrum abx for now--if remains to have negative blood culture results tomorrow then consider stopping the vancomycin and f/u speciation of the urine culture    LE: wound care consult    MORENO on CKD   Thought to be prerenal  Improving(declining srcr) while on IVF    Afib with rvr, HR has been on lower end while inpatient  Rate controlled  On apixaban    Hypertension in setting of afib hx which her HR has been on lower end while inpatient  Use prn hydralazine for now and adjust as needed  Followup on bp trend over the next 24 hours to see if pt can be started on beta blocker with holding parameters given her cad history    Parkinsons disease   Resting tremor is baseline  Resume current regimen    History of stroke, stable. No report of neurologic change  Stable, on antiplatelet tx and statin  Resume to monitor    History of peptic ulcer disease. Asymptomatic  On ppi    History of depression, anxiety. Pt is calm at bedside  Does not voice si/hi/hallucinations  Resume current tx of abilify and buspar    Dyslipidemia.    No myalgias reported with statin    CAD   No cp  Refer to above    Bilateral LE    Code status: full  DVT prophylaxis: already on apixaban and plavix (trend h/h in setting of IVF hydration)  --10/30 cbc panel appears dilutional results, ordered stat cbc for today, resume ppi)    Care Plan discussed with: Patient/Family  Anticipated Disposition: Home w/Family  Anticipated Discharge: 24 hours to 48 hours     Dispo: once the infectious organism speciates with sensitivity, then can determine if pt can be discharged on oral abx     Hospital Problems  Date Reviewed: 8/26/2021        Codes Class Noted POA    Cellulitis and abscess of lower extremity ICD-10-CM: L03.119, L02.419  ICD-9-CM: 682.6  10/29/2021 Unknown        UTI (urinary tract infection) ICD-10-CM: N39.0  ICD-9-CM: 599.0  4/9/2021 Unknown                Review of Systems:   A comprehensive review of systems was negative except for that written in the HPI. Vital Signs:    Last 24hrs VS reviewed since prior progress note. Most recent are:  Visit Vitals  BP (!) 144/53 (BP 1 Location: Left upper arm, BP Patient Position: At rest)   Pulse (!) 53   Temp 98.4 °F (36.9 °C)   Resp 20   Wt 66.4 kg (146 lb 6.2 oz)   SpO2 97%   BMI 24.36 kg/m²         Intake/Output Summary (Last 24 hours) at 10/31/2021 6919  Last data filed at 10/31/2021 0840  Gross per 24 hour   Intake    Output 1500 ml   Net -1500 ml        Physical Examination:     I had a face to face encounter with this patient and independently examined them on 10/31/21 as outlined below:     General: nad  Eyes: anicteric  ENT: no discharge, supple  Cardiac: no rubs, s1s2  Chest: no accessory muscle use  Pulm: breath sounds heard, no obvious wheezing  Abd: soft, no guarding, bs heard  Ext: no pitting edema, bilateral LE wrapped, pulses present  Neuro: moves all extremities, awake  Psych: does not voice si/hi/hallucinations          Data Review:         Labs:     Recent Labs     10/30/21  0228 10/29/21  1837   WBC 9.7 18.9*   HGB 8.4* 10.7*   HCT 25.4* 32.1*    346     Recent Labs     10/31/21  0103 10/30/21  0228 10/29/21  1837    134* 129*   K 4.3 3.7 4.8   * 107 102   CO2 20* 19* 21   BUN 39* 44* 47*   CREA 1.89* 2.28* 2.30*   * 230* 141*   CA 8.4* 8.4* 9.7   MG  --  1.8  --    PHOS 3.1  --   --      Recent Labs     10/31/21  0103 10/30/21  0228 10/29/21  1837   ALT  --  16 16   AP  --  106 123*   TBILI  --  0.3 0.5   TP  --  5.1* 6.8   ALB 2.0* 2.0* 3.0*   GLOB  --  3.1 3.8     No results for input(s): INR, PTP, APTT, INREXT, INREXT in the last 72 hours.    Recent Labs     10/31/21  0103   TIBC 140*   PSAT 9*   FERR 136      Lab Results   Component Value Date/Time    Folate 19.7 03/31/2021 03:55 AM      No results for input(s): PH, PCO2, PO2 in the last 72 hours. No results for input(s): CPK, CKNDX, TROIQ in the last 72 hours.     No lab exists for component: CPKMB  Lab Results   Component Value Date/Time    Cholesterol, total 148 09/23/2020 04:27 AM    HDL Cholesterol 52 09/23/2020 04:27 AM    LDL, calculated 83.8 09/23/2020 04:27 AM    Triglyceride 61 09/23/2020 04:27 AM    CHOL/HDL Ratio 2.8 09/23/2020 04:27 AM     Lab Results   Component Value Date/Time    Glucose (POC) 120 (H) 04/24/2021 04:54 PM    Glucose (POC) 176 (H) 04/24/2021 11:16 AM    Glucose (POC) 111 (H) 04/24/2021 08:45 AM    Glucose (POC) 138 (H) 04/02/2021 11:45 AM    Glucose (POC) 107 (H) 04/01/2021 09:16 PM     Lab Results   Component Value Date/Time    Color YELLOW/STRAW 10/29/2021 07:16 PM    Appearance CLOUDY (A) 10/29/2021 07:16 PM    Specific gravity 1.009 10/29/2021 07:16 PM    pH (UA) 5.0 10/29/2021 07:16 PM    Protein 100 (A) 10/29/2021 07:16 PM    Glucose Negative 10/29/2021 07:16 PM    Ketone Negative 10/29/2021 07:16 PM    Bilirubin Negative 10/29/2021 07:16 PM    Urobilinogen 0.2 10/29/2021 07:16 PM    Nitrites Negative 10/29/2021 07:16 PM    Leukocyte Esterase MODERATE (A) 10/29/2021 07:16 PM    Epithelial cells MODERATE (A) 10/29/2021 07:16 PM    Bacteria 2+ (A) 10/29/2021 07:16 PM    WBC 20-50 10/29/2021 07:16 PM    RBC 0-5 10/29/2021 07:16 PM         Medications Reviewed:     Current Facility-Administered Medications   Medication Dose Route Frequency    hydrALAZINE (APRESOLINE) 20 mg/mL injection 10 mg  10 mg IntraVENous Q6H PRN    piperacillin-tazobactam (ZOSYN) 3.375 g in 0.9% sodium chloride (MBP/ADV) 100 mL MBP  3.375 g IntraVENous Q12H    vancomycin (VANCOCIN) 500 mg in 0.9% sodium chloride (MBP/ADV) 100 mL MBP  500 mg IntraVENous Q24H    sodium chloride (NS) flush 5-10 mL  5-10 mL IntraVENous PRN    sodium chloride (NS) flush 5-40 mL  5-40 mL IntraVENous Q8H    sodium chloride (NS) flush 5-40 mL  5-40 mL IntraVENous PRN    acetaminophen (TYLENOL) tablet 650 mg  650 mg Oral Q6H PRN    Or    acetaminophen (TYLENOL) suppository 650 mg  650 mg Rectal Q6H PRN    polyethylene glycol (MIRALAX) packet 17 g  17 g Oral DAILY PRN    ondansetron (ZOFRAN ODT) tablet 4 mg  4 mg Oral Q8H PRN    Or    ondansetron (ZOFRAN) injection 4 mg  4 mg IntraVENous Q6H PRN    0.9% sodium chloride infusion  75 mL/hr IntraVENous CONTINUOUS    famotidine (PEPCID) tablet 20 mg  20 mg Oral BID    apixaban (ELIQUIS) tablet 2.5 mg  2.5 mg Oral BID    ARIPiprazole (ABILIFY) tablet 2 mg  2 mg Oral DAILY    atorvastatin (LIPITOR) tablet 40 mg  40 mg Oral QHS    busPIRone (BUSPAR) tablet 5 mg  5 mg Oral TID    carbidopa-levodopa (SINEMET)  mg per tablet 2 Tablet  2 Tablet Oral QID    clopidogreL (PLAVIX) tablet 75 mg  75 mg Oral DAILY AFTER BREAKFAST    [Held by provider] melatonin tablet 6 mg  6 mg Oral QHS    therapeutic multivitamin (THERAGRAN) tablet 1 Tablet  1 Tablet Oral DAILY    nitroglycerin (NITROSTAT) tablet 0.4 mg  0.4 mg SubLINGual Q5MIN PRN    Vancomycin Pharmacy Dosing   Other Rx Dosing/Monitoring     ______________________________________________________________________  EXPECTED LENGTH OF STAY: - - -  ACTUAL LENGTH OF STAY:          2                 Naresh Medina MD

## 2021-11-01 LAB
ALBUMIN SERPL-MCNC: 2 G/DL (ref 3.5–5)
ANION GAP SERPL CALC-SCNC: 4 MMOL/L (ref 5–15)
ANION GAP SERPL CALC-SCNC: 5 MMOL/L (ref 5–15)
BASOPHILS # BLD: 0 K/UL (ref 0–0.1)
BASOPHILS NFR BLD: 0 % (ref 0–1)
BUN SERPL-MCNC: 30 MG/DL (ref 6–20)
BUN SERPL-MCNC: 31 MG/DL (ref 6–20)
BUN/CREAT SERPL: 16 (ref 12–20)
BUN/CREAT SERPL: 17 (ref 12–20)
CALCIUM SERPL-MCNC: 8.9 MG/DL (ref 8.5–10.1)
CALCIUM SERPL-MCNC: 9.1 MG/DL (ref 8.5–10.1)
CHLORIDE SERPL-SCNC: 115 MMOL/L (ref 97–108)
CHLORIDE SERPL-SCNC: 115 MMOL/L (ref 97–108)
CO2 SERPL-SCNC: 19 MMOL/L (ref 21–32)
CO2 SERPL-SCNC: 20 MMOL/L (ref 21–32)
CREAT SERPL-MCNC: 1.82 MG/DL (ref 0.55–1.02)
CREAT SERPL-MCNC: 1.87 MG/DL (ref 0.55–1.02)
DIFFERENTIAL METHOD BLD: ABNORMAL
EOSINOPHIL # BLD: 0.2 K/UL (ref 0–0.4)
EOSINOPHIL NFR BLD: 2 % (ref 0–7)
ERYTHROCYTE [DISTWIDTH] IN BLOOD BY AUTOMATED COUNT: 12.3 % (ref 11.5–14.5)
GLUCOSE SERPL-MCNC: 142 MG/DL (ref 65–100)
GLUCOSE SERPL-MCNC: 145 MG/DL (ref 65–100)
HCT VFR BLD AUTO: 27.8 % (ref 35–47)
HGB BLD-MCNC: 8.9 G/DL (ref 11.5–16)
IMM GRANULOCYTES # BLD AUTO: 0.1 K/UL (ref 0–0.04)
IMM GRANULOCYTES NFR BLD AUTO: 1 % (ref 0–0.5)
LYMPHOCYTES # BLD: 0.7 K/UL (ref 0.8–3.5)
LYMPHOCYTES NFR BLD: 9 % (ref 12–49)
MCH RBC QN AUTO: 31.2 PG (ref 26–34)
MCHC RBC AUTO-ENTMCNC: 32 G/DL (ref 30–36.5)
MCV RBC AUTO: 97.5 FL (ref 80–99)
MONOCYTES # BLD: 0.7 K/UL (ref 0–1)
MONOCYTES NFR BLD: 9 % (ref 5–13)
NEUTS SEG # BLD: 6.6 K/UL (ref 1.8–8)
NEUTS SEG NFR BLD: 79 % (ref 32–75)
NRBC # BLD: 0 K/UL (ref 0–0.01)
NRBC BLD-RTO: 0 PER 100 WBC
PHOSPHATE SERPL-MCNC: 2.9 MG/DL (ref 2.6–4.7)
PLATELET # BLD AUTO: 287 K/UL (ref 150–400)
PMV BLD AUTO: 9.1 FL (ref 8.9–12.9)
POTASSIUM SERPL-SCNC: 4.2 MMOL/L (ref 3.5–5.1)
POTASSIUM SERPL-SCNC: 4.2 MMOL/L (ref 3.5–5.1)
RBC # BLD AUTO: 2.85 M/UL (ref 3.8–5.2)
RBC MORPH BLD: ABNORMAL
RBC MORPH BLD: ABNORMAL
SODIUM SERPL-SCNC: 139 MMOL/L (ref 136–145)
SODIUM SERPL-SCNC: 139 MMOL/L (ref 136–145)
WBC # BLD AUTO: 8.3 K/UL (ref 3.6–11)
WBC MORPH BLD: ABNORMAL

## 2021-11-01 PROCEDURE — 36415 COLL VENOUS BLD VENIPUNCTURE: CPT

## 2021-11-01 PROCEDURE — 74011000258 HC RX REV CODE- 258: Performed by: INTERNAL MEDICINE

## 2021-11-01 PROCEDURE — 80048 BASIC METABOLIC PNL TOTAL CA: CPT

## 2021-11-01 PROCEDURE — 80069 RENAL FUNCTION PANEL: CPT

## 2021-11-01 PROCEDURE — 74011250636 HC RX REV CODE- 250/636: Performed by: INTERNAL MEDICINE

## 2021-11-01 PROCEDURE — 74011250637 HC RX REV CODE- 250/637: Performed by: INTERNAL MEDICINE

## 2021-11-01 PROCEDURE — 74011250637 HC RX REV CODE- 250/637: Performed by: HOSPITALIST

## 2021-11-01 PROCEDURE — 65660000000 HC RM CCU STEPDOWN

## 2021-11-01 PROCEDURE — 74011250636 HC RX REV CODE- 250/636: Performed by: HOSPITALIST

## 2021-11-01 PROCEDURE — 85025 COMPLETE CBC W/AUTO DIFF WBC: CPT

## 2021-11-01 PROCEDURE — 74011000250 HC RX REV CODE- 250: Performed by: HOSPITALIST

## 2021-11-01 RX ORDER — HYDRALAZINE HYDROCHLORIDE 50 MG/1
50 TABLET, FILM COATED ORAL 3 TIMES DAILY
Status: DISCONTINUED | OUTPATIENT
Start: 2021-11-01 | End: 2021-11-03 | Stop reason: HOSPADM

## 2021-11-01 RX ORDER — CARVEDILOL 3.12 MG/1
3.12 TABLET ORAL 2 TIMES DAILY WITH MEALS
Status: DISCONTINUED | OUTPATIENT
Start: 2021-11-01 | End: 2021-11-03 | Stop reason: HOSPADM

## 2021-11-01 RX ADMIN — PANTOPRAZOLE SODIUM 40 MG: 40 TABLET, DELAYED RELEASE ORAL at 06:32

## 2021-11-01 RX ADMIN — BUSPIRONE HYDROCHLORIDE 5 MG: 5 TABLET ORAL at 10:14

## 2021-11-01 RX ADMIN — THERA TABS 1 TABLET: TAB at 10:14

## 2021-11-01 RX ADMIN — CLOPIDOGREL BISULFATE 75 MG: 75 TABLET ORAL at 10:14

## 2021-11-01 RX ADMIN — BUSPIRONE HYDROCHLORIDE 5 MG: 5 TABLET ORAL at 21:19

## 2021-11-01 RX ADMIN — HYDRALAZINE HYDROCHLORIDE 50 MG: 50 TABLET, FILM COATED ORAL at 10:14

## 2021-11-01 RX ADMIN — WATER 1 G: 1 INJECTION INTRAMUSCULAR; INTRAVENOUS; SUBCUTANEOUS at 12:16

## 2021-11-01 RX ADMIN — VANCOMYCIN HYDROCHLORIDE 750 MG: 750 INJECTION, POWDER, LYOPHILIZED, FOR SOLUTION INTRAVENOUS at 00:52

## 2021-11-01 RX ADMIN — ARIPIPRAZOLE 2 MG: 2 TABLET ORAL at 10:31

## 2021-11-01 RX ADMIN — PANTOPRAZOLE SODIUM 40 MG: 40 TABLET, DELAYED RELEASE ORAL at 16:41

## 2021-11-01 RX ADMIN — SODIUM CHLORIDE 75 ML/HR: 9 INJECTION, SOLUTION INTRAVENOUS at 21:22

## 2021-11-01 RX ADMIN — PIPERACILLIN AND TAZOBACTAM 3.38 G: 3; .375 INJECTION, POWDER, LYOPHILIZED, FOR SOLUTION INTRAVENOUS at 04:21

## 2021-11-01 RX ADMIN — HYDRALAZINE HYDROCHLORIDE 50 MG: 50 TABLET, FILM COATED ORAL at 21:19

## 2021-11-01 RX ADMIN — CARBIDOPA AND LEVODOPA 2 TABLET: 25; 100 TABLET ORAL at 13:00

## 2021-11-01 RX ADMIN — CARBIDOPA AND LEVODOPA 2 TABLET: 25; 100 TABLET ORAL at 19:00

## 2021-11-01 RX ADMIN — APIXABAN 2.5 MG: 2.5 TABLET, FILM COATED ORAL at 10:14

## 2021-11-01 RX ADMIN — CARBIDOPA AND LEVODOPA 2 TABLET: 25; 100 TABLET ORAL at 10:30

## 2021-11-01 RX ADMIN — APIXABAN 2.5 MG: 2.5 TABLET, FILM COATED ORAL at 19:00

## 2021-11-01 RX ADMIN — ATORVASTATIN CALCIUM 40 MG: 40 TABLET, FILM COATED ORAL at 21:19

## 2021-11-01 RX ADMIN — Medication 10 ML: at 22:00

## 2021-11-01 RX ADMIN — CARVEDILOL 3.12 MG: 3.12 TABLET, FILM COATED ORAL at 16:41

## 2021-11-01 RX ADMIN — BUSPIRONE HYDROCHLORIDE 5 MG: 5 TABLET ORAL at 16:41

## 2021-11-01 RX ADMIN — HYDRALAZINE HYDROCHLORIDE 25 MG: 25 TABLET, FILM COATED ORAL at 05:19

## 2021-11-01 RX ADMIN — CARBIDOPA AND LEVODOPA 2 TABLET: 25; 100 TABLET ORAL at 21:18

## 2021-11-01 RX ADMIN — CARVEDILOL 3.12 MG: 3.12 TABLET, FILM COATED ORAL at 10:14

## 2021-11-01 RX ADMIN — HYDRALAZINE HYDROCHLORIDE 50 MG: 50 TABLET, FILM COATED ORAL at 16:41

## 2021-11-01 RX ADMIN — Medication 10 ML: at 14:47

## 2021-11-01 NOTE — PROGRESS NOTES
Transition of Care Plan   RUR- High 20%   DISPOSITION: Home when stable    F/U with PCP/Specialist     Transport: [facility]      Reason for Admission:   Sepsis without acute organ dysfunction, due to unspecified organism                  RUR Score:     20%      PCP: First and Last name:  Al Durham DO     Name of Practice: [facility]   Are you a current patient: Yes/No:Yes   Approximate date of last visit: within a few weeks   Can you do a virtual visit with your PCP:              Resources/supports as identified by patient/family:   Patient has little social support, niece Raul Lomeli is MPOA, but does not live locally. Lives in [place] [address]. Top Challenges facing patient (as identified by patient/family and CM): Finances/Medication cost?  [employer] primary, patient does not have Medicaid. Has applied early 2021, did not qualify at that time. Will need spend down to qualify for Medicaid services. Unsure where patient is with spend down. If patient goes to SNF at discharge, will send [facility] referral.                  Transportation? Does not drive, needs WC van transport at discharge              Support system or lack thereof? Lacking social support, no children                     Living arrangements? Lives alone in 1st floor apartment             Self-care/ADLs/Cognition? Stated that she is mostly independent at baseline, ambulates with . Current Advanced Directive/Advance Care Plan:  Full Code      Healthcare Decision Maker:   Click here to complete HealthCare Decision Makers including selection of the Healthcare Decision Maker Relationship (ie \"Primary\")      Primary Decision Maker:  Magnoliadelta Davis - Other Relative - [phone]    Payor Source Payor: Mani Galicia / Plan: [address] [place] [address] HMO / Product Type: Managed Care Medicare /                             Plan for utilizing home health:    Hx of  with [facility], patient did not recall this, but per previous admission notes. Transition of Care Plan:              CM met with patient at bedside to introduce self and role. DME: [facility]  Previous IPR/SNF: [facility]  Previous home health: [facility]  Demographics: confirmed  Pharmacy: [facility] point of contact: Cheko delgado [phone]    CM to follow patient progress and assist as recommended with ANDREA plan. Care Management Interventions  PCP Verified by CM: Yes  Last Visit to PCP: 10/12/21  Palliative Care Criteria Met (RRAT>21 & CHF Dx)?: No  Mode of Transport at Discharge: Other (see comment) (will need [facility] transport)  Discharge Durable Medical Equipment: No  Health Maintenance Reviewed: Yes  Physical Therapy Consult: No  Occupational Therapy Consult: No  Speech Therapy Consult: No  Support Systems: Other Family Member(s)  Confirm Follow Up Transport: Wheelchair [facility]  Discharge Location  Discharge Placement: Home    Medicare pt has received, reviewed, and signed 2nd IM letter informing them of their right to appeal the discharge. Signed copy has been placed on pt bedside chart. PATRICK Díaz

## 2021-11-01 NOTE — PROGRESS NOTES
[facility] [facility] Adult  Hospitalist Group                                                                                          Hospitalist Progress Note          Date of Service:  2021  NAME:  Eligio Moyer  :  1940  MRN:  [id number]        Admission Summary:   \"an 70-year-old female extremely poor historian. On reviewing the chart, the patient has a history of hypertension, atrial fibrillation, currently on Eliquis for the same. The patient has not been able to take care of herself. Also the patient was sent by her power of  for generalized weakness, failure to thrive, and the patient was not feeling well. The patient upon arrival to the ER was found to have a temperature of 102 and was found to be hypotensive. The labs showed elevated WBC count of 18,000 and creatinine of 2.3. The patient's baseline creatinine is 1.9. The patient was not confused. The other labs that were ordered showed a CT scan which was negative. The urine showed significant urinary tract infection. The patient is currently being admitted for the management of this condition. On further questioning, the patient does complain of some swelling of the bilateral lower extremities which has recently been getting worse, does have a history of peripheral vascular disease, for which she has been taking Pletal for the same. Denies complaints of any chest pain or chest pressure or shortness of breath. Denies complaints of any nausea, vomiting, or diarrhea. Denies complaints of any urgency, frequency, burning of urination. continues to have low mood. Denies having any suicidal or homicidal ideations. \"       Interval history / Subjective:   No acute complaints, denies pain, n/v/d, dyspnea     Assessment & Plan:     Sepsis   Likely coming from UTI but has noted areas of LE that is wrapped  On vancomycin and zosyn   Urine culture gram negative rods thus far  Blood culture negative growth thus far  Will d/c vancomycin and change Zosyn to ceftriaxone    LE: wound care consult    MORENO on CKD   Improved    Afib with rvr, HR has been on lower end while inpatient  Rate controlled  On apixaban    Hypertension in setting of afib hx which her HR has been on lower end while inpatient  Use prn hydralazine for now and adjust as needed  Resume beta blocker - monitor HR (was bradycardic)    Parkinsons disease     History of stroke, stable. No report of neurologic change  Stable, on antiplatelet tx and statin    History of peptic ulcer disease. Asymptomatic  On ppi    History of depression, anxiety. Dyslipidemia. CAD     Code status: full  DVT prophylaxis: anticoagulated    Care Plan discussed with: Patient/Family  Anticipated Disposition: Home w/Family  Anticipated Discharge: 24 hours to 48 hours     Dispo: once the infectious organism speciates with sensitivity, then can determine if pt can be discharged on oral abx     Hospital Problems  Date Reviewed: 8/26/2021        Codes Class Noted POA    Cellulitis and abscess of lower extremity ICD-10-CM: L03.119, L02.419  ICD-9-CM: 682.6  10/29/2021 Unknown        UTI (urinary tract infection) ICD-10-CM: N39.0  ICD-9-CM: 599.0  4/9/2021 Unknown                Review of Systems:   Pertinent items are noted in HPI. Vital Signs:    Last 24hrs VS reviewed since prior progress note.  Most recent are:  Visit Vitals  BP (!) 161/58   Pulse 81   Temp 98.5 °F (36.9 °C)   Resp 20   Wt 68.6 kg (151 lb 3.8 oz)   SpO2 95%   BMI 25.17 kg/m²         Intake/Output Summary (Last 24 hours) at 11/1/2021 9285  Last data filed at 10/31/2021 1548  Gross per 24 hour   Intake    Output 750 ml   Net -750 ml        Physical Examination:     I had a face to face encounter with this patient and independently examined them on 11/01/21 as outlined below:     General: nad  Eyes: anicteric  ENT: no discharge, supple  Cardiac: no rubs, s1s2, rrr  Chest: no accessory muscle use  Pulm: cta b/l, normal wob  Abd: soft, no guarding, bs heard  Ext: no pitting edema, bilateral LE wrapped, pulses present  Neuro: grossly non-focal  Psych: not anxious nor agitated          Data Review:         Labs:     Recent Labs     11/01/21  0044 10/31/21  1142 10/30/21  0228 10/30/21  0228   WBC 8.3  --   --  9.7   HGB 8.9* 9.0*   < > 8.4*   HCT 27.8* 28.2*   < > 25.4*     --   --  243    < > = values in this interval not displayed. Recent Labs     11/01/21  0045 11/01/21  0044 10/31/21  0103 10/30/21  0228 10/30/21  0228    139 138   < > 134*   K 4.2 4.2 4.3   < > 3.7   * 115* 113*   < > 107   CO2 19* 20* 20*   < > 19*   BUN 30* 31* 39*   < > 44*   CREA 1.87* 1.82* 1.89*   < > 2.28*   * 145* 145*   < > 230*   CA 9.1 8.9 8.4*   < > 8.4*   MG  --   --   --   --  1.8   PHOS 2.9  --  3.1  --   --     < > = values in this interval not displayed. Recent Labs     11/01/21  0045 10/31/21  0103 10/30/21  0228 10/29/21  1837 10/29/21  1837   ALT  --   --  16  --  16   AP  --   --  106  --  123*   TBILI  --   --  0.3  --  0.5   TP  --   --  5.1*  --  6.8   ALB 2.0* 2.0* 2.0*   < > 3.0*   GLOB  --   --  3.1  --  3.8    < > = values in this interval not displayed. No results for input(s): INR, PTP, APTT, INREXT, INREXT in the last 72 hours. Recent Labs     10/31/21  0103   TIBC 140*   PSAT 9*   FERR 136      Lab Results   Component Value Date/Time    Folate 19.7 03/31/2021 03:55 AM      No results for input(s): PH, PCO2, PO2 in the last 72 hours. No results for input(s): CPK, CKNDX, TROIQ in the last 72 hours.     No lab exists for component: CPKMB  Lab Results   Component Value Date/Time    Cholesterol, total 148 09/23/2020 04:27 AM    HDL Cholesterol 52 09/23/2020 04:27 AM    LDL, calculated 83.8 09/23/2020 04:27 AM    Triglyceride 61 09/23/2020 04:27 AM    CHOL/HDL Ratio 2.8 09/23/2020 04:27 AM     Lab Results   Component Value Date/Time    Glucose (POC) 120 (H) 04/24/2021 04:54 PM    Glucose (POC) 176 (H) 04/24/2021 11:16 AM    Glucose (POC) 111 (H) 04/24/2021 08:45 AM    Glucose (POC) 138 (H) 04/02/2021 11:45 AM    Glucose (POC) 107 (H) 04/01/2021 09:16 PM     Lab Results   Component Value Date/Time    Color YELLOW/STRAW 10/29/2021 07:16 PM    Appearance CLOUDY (A) 10/29/2021 07:16 PM    Specific gravity 1.009 10/29/2021 07:16 PM    pH (UA) 5.0 10/29/2021 07:16 PM    Protein 100 (A) 10/29/2021 07:16 PM    Glucose Negative 10/29/2021 07:16 PM    Ketone Negative 10/29/2021 07:16 PM    Bilirubin Negative 10/29/2021 07:16 PM    Urobilinogen 0.2 10/29/2021 07:16 PM    Nitrites Negative 10/29/2021 07:16 PM    Leukocyte Esterase MODERATE (A) 10/29/2021 07:16 PM    Epithelial cells MODERATE (A) 10/29/2021 07:16 PM    Bacteria 2+ (A) 10/29/2021 07:16 PM    WBC 20-50 10/29/2021 07:16 PM    RBC 0-5 10/29/2021 07:16 PM         Medications Reviewed:     Current Facility-Administered Medications   Medication Dose Route Frequency    pantoprazole (PROTONIX) tablet 40 mg  40 mg Oral ACB&D    vancomycin (VANCOCIN) 750 mg in 0.9% sodium chloride 250 mL (VIAL-MATE)  750 mg IntraVENous Q24H    piperacillin-tazobactam (ZOSYN) 3.375 g in 0.9% sodium chloride (MBP/ADV) 100 mL MBP  3.375 g IntraVENous Q8H    hydrALAZINE (APRESOLINE) tablet 25 mg  25 mg Oral Q8H    hydrALAZINE (APRESOLINE) 20 mg/mL injection 20 mg  20 mg IntraVENous Q6H PRN    sodium chloride (NS) flush 5-10 mL  5-10 mL IntraVENous PRN    sodium chloride (NS) flush 5-40 mL  5-40 mL IntraVENous Q8H    sodium chloride (NS) flush 5-40 mL  5-40 mL IntraVENous PRN    acetaminophen (TYLENOL) tablet 650 mg  650 mg Oral Q6H PRN    Or    acetaminophen (TYLENOL) suppository 650 mg  650 mg Rectal Q6H PRN    polyethylene glycol (MIRALAX) packet 17 g  17 g Oral DAILY PRN    ondansetron (ZOFRAN ODT) tablet 4 mg  4 mg Oral Q8H PRN    Or    ondansetron (ZOFRAN) injection 4 mg  4 mg IntraVENous Q6H PRN    0.9% sodium chloride infusion  75 mL/hr IntraVENous CONTINUOUS    apixaban (ELIQUIS) tablet 2.5 mg  2.5 mg Oral BID    ARIPiprazole (ABILIFY) tablet 2 mg  2 mg Oral DAILY    atorvastatin (LIPITOR) tablet 40 mg  40 mg Oral QHS    busPIRone (BUSPAR) tablet 5 mg  5 mg Oral TID    carbidopa-levodopa (SINEMET)  mg per tablet 2 Tablet  2 Tablet Oral QID    clopidogreL (PLAVIX) tablet 75 mg  75 mg Oral DAILY AFTER BREAKFAST    [Held by provider] melatonin tablet 6 mg  6 mg Oral QHS    therapeutic multivitamin (THERAGRAN) tablet 1 Tablet  1 Tablet Oral DAILY    nitroglycerin (NITROSTAT) tablet 0.4 mg  0.4 mg SubLINGual Q5MIN PRN    Vancomycin Pharmacy Dosing   Other Rx Dosing/Monitoring     ______________________________________________________________________  EXPECTED LENGTH OF STAY: - - -  ACTUAL LENGTH OF STAY:          3                 Lawrence Greenberg MD

## 2021-11-01 NOTE — PROGRESS NOTES
Bedside and Verbal shift change report given to Licha Landrum RN (oncoming nurse) by Pop Lucas RN (offgoing nurse). Report included the following information SBAR, Kardex, Intake/Output, MAR, Recent Results, Cardiac Rhythm Sinus Carla Kasal, Alarm Parameters  and Quality Measures.

## 2021-11-01 NOTE — PROGRESS NOTES
Nephrology Progress Note  [address]     [facility]. [facility]  Phone - [phone]   Patient: Gaetana Pallas    YOB: 1940        Date- 11/1/2021   Admit Date: 10/29/2021  CC: Follow up for moreno          IMPRESSION & PLAN:   · MORENO  due to pre renal factors +/- ATN due to low bp-- bumex use  · Hyponatremia-- likely due to dehydration  · ckd 3 a- cr 2.0 in august 2021---cr 1.4 in august 2021  · Hypertension  · pvd  · Iron defi anemia  · S/p left CEA  · Renal artery dis-   · H/o CHF  · CABG 2015   H/O AAA    PLAN-   Continue ivf   Cont current BP meds   Daily labs and I/Os   Subjective: Interval History:   Seen and examined. Cr stable. No cp, sob, n/v/d. Poor po intake. Objective:   Vitals:    11/01/21 0601 11/01/21 0605 11/01/21 0633 11/01/21 1003   BP:   (!) 161/58    Pulse: 78 79 81 93   Resp:  20     Temp:  98.5 °F (36.9 °C)     SpO2:       Weight:   68.6 kg (151 lb 3.8 oz)       10/31 0701 - 11/01 0700  In: -   Out: 2250 [Urine:2250]  Last 3 Recorded Weights in this Encounter    10/29/21 1831 10/31/21 0828 11/01/21 0633   Weight: 72.4 kg (159 lb 9.8 oz) 66.4 kg (146 lb 6.2 oz) 68.6 kg (151 lb 3.8 oz)      Physical exam:   GEN: NAD  NECK- Supple, no mass  RESP: No wheezing, Clear b/l  CVS: S1,S2  RRR  NEURO: Normal speech, Non focal  PSYCH: Normal Mood    Chart reviewed. Pertinent Notes reviewed. Data Review :  Recent Labs     11/01/21  0045 11/01/21  0044 10/31/21  0103 10/30/21  0228 10/30/21  0228    139 138   < > 134*   K 4.2 4.2 4.3   < > 3.7   * 115* 113*   < > 107   CO2 19* 20* 20*   < > 19*   BUN 30* 31* 39*   < > 44*   CREA 1.87* 1.82* 1.89*   < > 2.28*   * 145* 145*   < > 230*   CA 9.1 8.9 8.4*   < > 8.4*   MG  --   --   --   --  1.8   PHOS 2.9  --  3.1  --   --     < > = values in this interval not displayed.      Recent Labs     11/01/21  0044 10/31/21  1142 10/30/21  0228 10/29/21  1837 10/29/21  1837   WBC 8.3  --  9.7  -- 18.9*   HGB 8.9* 9.0* 8.4*   < > 10.7*   HCT 27.8* 28.2* 25.4*   < > 32.1*     --  243  --  346    < > = values in this interval not displayed.      Recent Labs     10/31/21  0103   TIBC 140*   PSAT 9*   FERR 136      Medication list  reviewed  Current Facility-Administered Medications   Medication Dose Route Frequency    cefTRIAXone (ROCEPHIN) 1 g in sterile water (preservative free) 10 mL IV syringe  1 g IntraVENous Q24H    hydrALAZINE (APRESOLINE) tablet 50 mg  50 mg Oral TID    carvediloL (COREG) tablet 3.125 mg  3.125 mg Oral BID WITH MEALS    pantoprazole (PROTONIX) tablet 40 mg  40 mg Oral ACB&D    hydrALAZINE (APRESOLINE) 20 mg/mL injection 20 mg  20 mg IntraVENous Q6H PRN    sodium chloride (NS) flush 5-10 mL  5-10 mL IntraVENous PRN    sodium chloride (NS) flush 5-40 mL  5-40 mL IntraVENous Q8H    sodium chloride (NS) flush 5-40 mL  5-40 mL IntraVENous PRN    acetaminophen (TYLENOL) tablet 650 mg  650 mg Oral Q6H PRN    Or    acetaminophen (TYLENOL) suppository 650 mg  650 mg Rectal Q6H PRN    polyethylene glycol (MIRALAX) packet 17 g  17 g Oral DAILY PRN    ondansetron (ZOFRAN ODT) tablet 4 mg  4 mg Oral Q8H PRN    Or    ondansetron (ZOFRAN) injection 4 mg  4 mg IntraVENous Q6H PRN    0.9% sodium chloride infusion  75 mL/hr IntraVENous CONTINUOUS    apixaban (ELIQUIS) tablet 2.5 mg  2.5 mg Oral BID    ARIPiprazole (ABILIFY) tablet 2 mg  2 mg Oral DAILY    atorvastatin (LIPITOR) tablet 40 mg  40 mg Oral QHS    busPIRone (BUSPAR) tablet 5 mg  5 mg Oral TID    carbidopa-levodopa (SINEMET)  mg per tablet 2 Tablet  2 Tablet Oral QID    clopidogreL (PLAVIX) tablet 75 mg  75 mg Oral DAILY AFTER BREAKFAST    [Held by provider] melatonin tablet 6 mg  6 mg Oral QHS    therapeutic multivitamin (THERAGRAN) tablet 1 Tablet  1 Tablet Oral DAILY    nitroglycerin (NITROSTAT) tablet 0.4 mg  0.4 mg SubLINGual Q5MIN PRN          Chiara Bar MD              [facility] [address], [address]  [address] [facility] [address], [address]  Phone - [phone]               Fax - [phone]

## 2021-11-01 NOTE — WOUND CARE
WOCN Note:  
 
New wound care consult placed for lower leg wounds Chart shows: 
Patient admitted on 10/29/21 with UTI Past Medical History:  
Diagnosis Date  Anxiety disorder  Atrial fibrillation ([facility]  CAD (coronary artery disease) 2007  
 stents, CABG x 3v  Carotid stenosis  Cervical stenosis of spinal canal 07/2019  Chronic kidney disease  Cough  CVA (cerebral vascular accident) ([facility] 07/2019  
 left lacunar infarct at head of caudate  Depression AND CHRONIC ANXIETY  Diabetes ([facility]  GERD (gastroesophageal reflux disease)  High cholesterol  History of peptic ulcer Bleeding ulcer with increased NSAID use  Hypertension  Left carotid stenosis 07/2019  
 s/p left CEA with Dr. Dusty Alves  MI, old 2007  PUD (peptic ulcer disease)  Stroke ([facility]  Tremor  Valvular heart disease 11/1/21 WBC = 8.3 Hgb = 8.9 Assessment:  
A&O x 3, Appropriately conversational  
Reports no pain Mobility: minimal assistance with repositioning. Continence: Continent of urine and stool with periods of periods of incontinence. Wearing briefs Last Gregorio Score: 16 
Surface: total care mattress Diet: regular Bilateral heel skin intact and without erythema Heels offloaded with pillows Removed compression wraps and dressings from bilateral lower legs. Compression wraps started at ankles and ended behind knees. Patient doesn't know when the home care nurse changed the lower leg dressings last 
 
Bilateral lower extremities with dry flaky skin, woody fibrosis, and hemosiderin staining. Legs warm to touch. Distal lower legs with erythema, chornic, (stasis dermatitis?). No edema at this time. Legs elevated on pillows 1. POA left distal lateral lower leg wound Etiology: venous insufficiency? Partial thickness 8 x 6  cm Base is pink  
scant amount serosang exudate 
no odor undefined edges Periwound intact Treatment: Bilateral lower legs washed with soap and water, petroleum jelly to intact skin,  wound cleansed with saline, xeroform, abd pad and conforming gauze roll 2. POA left distal medial lower leg wound Etiology: venous insufficiency? Partial thickness 8 x 3  cm Base is pink  
scant amount serosang exudate 
no odor  
undefined edges Periwound intact Treatment: Bilateral lower legs washed with soap and water, petroleum jelly to intact skin, wound cleansed with saline, xeroform, abd pad and conforming gauze roll 3. POA right distal lateral lower leg wound Etiology: venous insufficiency? Partial thickness 1.5 x 0.8  cm Base is pink  
scant amount serosang exudate 
no odor  
undefined edges Periwound intact Treatment: Bilateral lower legs washed with soap and water, petroleum jelly to intact skin, wound cleansed with saline, xeroform, abd pad and conforming gauze roll 4. POA right distal medial lower leg wound Etiology: venous insufficiency? Partial thickness 8 x 6  cm Base is pink  
scant amount serosang exudate 
no odor  
undefined edges Periwound intact Treatment: Bilateral lower legs washed with soap and water, petroleum jelly to intact skin, wound cleansed with saline, xeroform, abd pad and conforming gauze roll Wound Recommendations:   
 
Cleanses bilateral lower leg wounds with saline, apply xeroform, cover with abd pads and stretch conforming gauze roll daily Aquaphor moisturizer to intact skin to bilateral lower legs and feet daily PI Prevention: 
Turn/reposition approximately every 2 hours Offload heels with pillows  at all times in bed. Sacral Foam dressing: lift to assess regularly; change as needed. Discontinue if incontinent. Z-guard cream  to buttocks and sacrum TID  and as needed with incontinence care Pad bony prominences Keep HOB 30 degrees or less to decrease shearing and pressure unless medically contraindicated. If HOB is to be over 30 degrees, raise knees first then [facility] to prevent sliding Minimize layers of linen/pads under patient to optimize support surface to one  sheet and one incontinence pad Dr. Lety Reis at the bedside Discussed with RN Transition of Care: Plan to follow weekly and as needed while admitted to hospital 
 
1 Lu Weinberg MSN, RN, [address] [place] [address],[address], [facility] Certified Wound and Ostomy Nurse 
office [phone] Can be reached through [facility] 
pager [phone] or call  to page

## 2021-11-02 LAB
ANION GAP SERPL CALC-SCNC: 4 MMOL/L (ref 5–15)
BACTERIA SPEC CULT: ABNORMAL
BUN SERPL-MCNC: 25 MG/DL (ref 6–20)
BUN/CREAT SERPL: 16 (ref 12–20)
CALCIUM SERPL-MCNC: 8.7 MG/DL (ref 8.5–10.1)
CC UR VC: ABNORMAL
CHLORIDE SERPL-SCNC: 118 MMOL/L (ref 97–108)
CO2 SERPL-SCNC: 20 MMOL/L (ref 21–32)
CREAT SERPL-MCNC: 1.55 MG/DL (ref 0.55–1.02)
ERYTHROCYTE [DISTWIDTH] IN BLOOD BY AUTOMATED COUNT: 13 % (ref 11.5–14.5)
GLUCOSE SERPL-MCNC: 115 MG/DL (ref 65–100)
HCT VFR BLD AUTO: 25.9 % (ref 35–47)
HGB BLD-MCNC: 8.5 G/DL (ref 11.5–16)
MCH RBC QN AUTO: 31.8 PG (ref 26–34)
MCHC RBC AUTO-ENTMCNC: 32.8 G/DL (ref 30–36.5)
MCV RBC AUTO: 97 FL (ref 80–99)
NRBC # BLD: 0 K/UL (ref 0–0.01)
NRBC BLD-RTO: 0 PER 100 WBC
PLATELET # BLD AUTO: 299 K/UL (ref 150–400)
PMV BLD AUTO: 9.1 FL (ref 8.9–12.9)
POTASSIUM SERPL-SCNC: 4.2 MMOL/L (ref 3.5–5.1)
RBC # BLD AUTO: 2.67 M/UL (ref 3.8–5.2)
SERVICE CMNT-IMP: ABNORMAL
SODIUM SERPL-SCNC: 142 MMOL/L (ref 136–145)
WBC # BLD AUTO: 6.2 K/UL (ref 3.6–11)

## 2021-11-02 PROCEDURE — 97161 PT EVAL LOW COMPLEX 20 MIN: CPT

## 2021-11-02 PROCEDURE — 36415 COLL VENOUS BLD VENIPUNCTURE: CPT

## 2021-11-02 PROCEDURE — 85027 COMPLETE CBC AUTOMATED: CPT

## 2021-11-02 PROCEDURE — 97530 THERAPEUTIC ACTIVITIES: CPT

## 2021-11-02 PROCEDURE — 74011250637 HC RX REV CODE- 250/637: Performed by: HOSPITALIST

## 2021-11-02 PROCEDURE — 80048 BASIC METABOLIC PNL TOTAL CA: CPT

## 2021-11-02 PROCEDURE — 74011000250 HC RX REV CODE- 250: Performed by: HOSPITALIST

## 2021-11-02 PROCEDURE — 97165 OT EVAL LOW COMPLEX 30 MIN: CPT

## 2021-11-02 PROCEDURE — 74011250636 HC RX REV CODE- 250/636: Performed by: HOSPITALIST

## 2021-11-02 PROCEDURE — 74011250637 HC RX REV CODE- 250/637: Performed by: INTERNAL MEDICINE

## 2021-11-02 PROCEDURE — 65660000000 HC RM CCU STEPDOWN

## 2021-11-02 RX ADMIN — APIXABAN 2.5 MG: 2.5 TABLET, FILM COATED ORAL at 08:21

## 2021-11-02 RX ADMIN — ATORVASTATIN CALCIUM 40 MG: 40 TABLET, FILM COATED ORAL at 21:41

## 2021-11-02 RX ADMIN — HYDRALAZINE HYDROCHLORIDE 50 MG: 50 TABLET, FILM COATED ORAL at 21:41

## 2021-11-02 RX ADMIN — PANTOPRAZOLE SODIUM 40 MG: 40 TABLET, DELAYED RELEASE ORAL at 15:55

## 2021-11-02 RX ADMIN — WATER 1 G: 1 INJECTION INTRAMUSCULAR; INTRAVENOUS; SUBCUTANEOUS at 10:55

## 2021-11-02 RX ADMIN — BUSPIRONE HYDROCHLORIDE 5 MG: 5 TABLET ORAL at 21:41

## 2021-11-02 RX ADMIN — Medication 10 ML: at 22:00

## 2021-11-02 RX ADMIN — CLOPIDOGREL BISULFATE 75 MG: 75 TABLET ORAL at 08:21

## 2021-11-02 RX ADMIN — THERA TABS 1 TABLET: TAB at 08:21

## 2021-11-02 RX ADMIN — Medication 10 ML: at 14:00

## 2021-11-02 RX ADMIN — CARBIDOPA AND LEVODOPA 2 TABLET: 25; 100 TABLET ORAL at 18:42

## 2021-11-02 RX ADMIN — CARBIDOPA AND LEVODOPA 2 TABLET: 25; 100 TABLET ORAL at 21:41

## 2021-11-02 RX ADMIN — PANTOPRAZOLE SODIUM 40 MG: 40 TABLET, DELAYED RELEASE ORAL at 07:11

## 2021-11-02 RX ADMIN — CARVEDILOL 3.12 MG: 3.12 TABLET, FILM COATED ORAL at 08:21

## 2021-11-02 RX ADMIN — ARIPIPRAZOLE 2 MG: 2 TABLET ORAL at 11:05

## 2021-11-02 RX ADMIN — APIXABAN 2.5 MG: 2.5 TABLET, FILM COATED ORAL at 18:42

## 2021-11-02 RX ADMIN — CARVEDILOL 3.12 MG: 3.12 TABLET, FILM COATED ORAL at 18:42

## 2021-11-02 RX ADMIN — BUSPIRONE HYDROCHLORIDE 5 MG: 5 TABLET ORAL at 08:21

## 2021-11-02 RX ADMIN — BUSPIRONE HYDROCHLORIDE 5 MG: 5 TABLET ORAL at 15:55

## 2021-11-02 RX ADMIN — CARBIDOPA AND LEVODOPA 2 TABLET: 25; 100 TABLET ORAL at 08:21

## 2021-11-02 RX ADMIN — WHITE PETROLATUM 1 APPLICATOR: 1.75 OINTMENT TOPICAL at 11:05

## 2021-11-02 RX ADMIN — HYDRALAZINE HYDROCHLORIDE 50 MG: 50 TABLET, FILM COATED ORAL at 15:55

## 2021-11-02 RX ADMIN — Medication 10 ML: at 08:31

## 2021-11-02 RX ADMIN — CARBIDOPA AND LEVODOPA 2 TABLET: 25; 100 TABLET ORAL at 13:02

## 2021-11-02 RX ADMIN — HYDRALAZINE HYDROCHLORIDE 50 MG: 50 TABLET, FILM COATED ORAL at 08:21

## 2021-11-02 NOTE — PROGRESS NOTES
Problem: Mobility Impaired (Adult and Pediatric)  Goal: *Acute Goals and Plan of Care (Insert Text)  Description: FUNCTIONAL STATUS PRIOR TO ADMISSION: Pt questionable historian. Per chart review and pt report, she was living alone at [address] [place] [address]. Pt was mod I using w/c and was able to transfer to/from w/c independently. She was having trouble taking care of herself. Reports niece drives her to appointments. Physical Therapy Goals  Initiated 11/2/2021  1. Patient will move from supine to sit and sit to supine, scoot up and down, and roll side to side in bed with CGA within 7 day(s). 2.  Patient will transfer from bed to chair and chair to bed with CGA using the least restrictive device within 7 day(s). 3.  Patient will perform sit to stand with CGA within 7 day(s). 4.  Patient will self propel herself in wheelchair x20 ft with Thien within 7 day(s). Outcome: Not Met  PHYSICAL THERAPY EVALUATION  Patient: Connor Tirado (31 y.o. female)  Date: 11/2/2021  Primary Diagnosis: Cellulitis and abscess of lower extremity [L03.119, L02.419]  UTI (urinary tract infection) [N39.0]        Precautions: Fall       ASSESSMENT  Based on the objective data described below, the patient presents with confusion, generalized weakness, impaired balance, and overall decline in functional mobility s/p admission for leg wounds and UTI. Per chart review and pt report, she was living alone at an ILF but was unable to care for herself and endorses difficulty competing ADLs but did not have assistance available. Pt was mobilized at wheelchair level, was non-ambulatory, self propelled wheelchair without assistance and performed stand-pivot transfers without assistance (at 180 degree angle). Note niece drives her to appointments. This date, pt was oriented x2, drowsy but agreeable to work with therapy with encouragement. She transferred supine>sit with Thien, sit<>stand with modA x2 and took pivoted bed>chair with modA x2. Recommending SNF upon discharge. Current Level of Function Impacting Discharge (mobility/balance): modA x2 for bed>chair transfer     Functional Outcome Measure: The patient scored 35/100 on the Barthel outcome measure which is indicative of 65% impairment and dependence on others for basic mobility and self care tasks. Other factors to consider for discharge: no support at home, lives alone, confusion, fall risk     Patient will benefit from skilled therapy intervention to address the above noted impairments. PLAN :  Recommendations and Planned Interventions: bed mobility training, transfer training, gait training, therapeutic exercises, neuromuscular re-education, patient and family training/education, and therapeutic activities      Frequency/Duration: Patient will be followed by physical therapy:  3 times a week to address goals.     Recommendation for discharge: (in order for the patient to meet his/her long term goals)  Therapy up to 5 days/week in SNF setting    This discharge recommendation:  Has not yet been discussed the attending provider and/or case management    IF patient discharges home will need the following DME: to be determined (TBD)         SUBJECTIVE:   Patient stated Any way I can  when asked how pt got meals    OBJECTIVE DATA SUMMARY:   HISTORY:    Past Medical History:   Diagnosis Date    Anxiety disorder     Atrial fibrillation (HCC)     CAD (coronary artery disease) 2007    stents, CABG x 3v    Carotid stenosis     Cervical stenosis of spinal canal 07/2019    Chronic kidney disease     Cough     CVA (cerebral vascular accident) ([address] 07/2019    left lacunar infarct at head of caudate    Depression     AND CHRONIC ANXIETY    Diabetes ([facility]     GERD (gastroesophageal reflux disease)     High cholesterol     History of peptic ulcer     Bleeding ulcer with increased NSAID use    Hypertension     Left carotid stenosis 07/2019    s/p left CEA with Dr. Criss Mayes    MI, old 2007    PUD (peptic ulcer disease)     Stroke ([facility]     Tremor     Valvular heart disease      Past Surgical History:   Procedure Laterality Date    HX CAROTID ENDARTERECTOMY  2019    HX CORONARY ARTERY BYPASS GRAFT      HX TONSILLECTOMY  1963    [place] CARDIAC SURG PROCEDURE UNLIST      CABG X3 VESSEL    [place] TOTAL KNEE ARTHROPLASTY Right 2015     Home Situation  Home Environment: Independent living  One/Two Story Residence: One story  Living Alone: Yes  Support Systems:  (neice drives her to apts)  Current DME Used/Available at Home: Wheelchair, Walker, rolling  Tub or Shower Type:  (sponge bath)    EXAMINATION/PRESENTATION/DECISION MAKING:   Critical Behavior:  Neurologic State: Alert  Orientation Level: Oriented to person, Oriented to place, Disoriented to situation, Disoriented to time  Cognition: Follows commands     Hearing: Auditory  Auditory Impairment: None    Range Of Motion:  AROM: Grossly decreased, non-functional                       Strength:    Strength: Generally decreased, functional                    Tone & Sensation:   Tone: Normal                              Coordination:  Coordination: Generally decreased, functional  Vision:   Visual Fields:  (able to detect stimuli in all fields)  Acuity: Within Defined Limits  Functional Mobility:  Bed Mobility:  Supine>sit: Thien      Transfers:  Sit to Stand: Moderate assistance;Assist x2  Stand to Sit: Minimum assistance;Assist x2        Bed to Chair: Moderate assistance;Assist x2              Balance:   Sitting: Impaired  Sitting - Static: Good (unsupported)  Sitting - Dynamic: Fair (occasional)  Standing: Impaired  Standing - Static: Fair;Constant support  Standing - Dynamic : Poor;Constant support    Functional Measure:  Barthel Index:    Bathin  Bladder: 5  Bowels: 5  Groomin  Dressin  Feeding: 10  Mobility: 0  Stairs: 0  Toilet Use: 0  Transfer (Bed to Chair and Back): 10  Total: 35/100       The Barthel ADL Index: Guidelines  1.  The index should be used as a record of what a patient does, not as a record of what a patient could do. 2. The main aim is to establish degree of independence from any help, physical or verbal, however minor and for whatever reason. 3. The need for supervision renders the patient not independent. 4. A patient's performance should be established using the best available evidence. Asking the patient, friends/relatives and nurses are the usual sources, but direct observation and common sense are also important. However direct testing is not needed. 5. Usually the patient's performance over the preceding 24-48 hours is important, but occasionally longer periods will be relevant. 6. Middle categories imply that the patient supplies over 50 per cent of the effort. 7. Use of aids to be independent is allowed. Peter Trinh., Barthel, D.W. (2617). Functional evaluation: the Barthel Index. [address] [employer] (14)2. Samuel Raineyr isaiah ROSA ELENA MccurdyF, Arcadio Archibald., [place]., [place], [address] [place] [address] (1999). Measuring the change indisability after inpatient rehabilitation; comparison of the responsiveness of the Barthel Index and Functional [place] Measure. Journal of Neurology, Neurosurgery, and Psychiatry, 66(4), 310-929. Milo Gutierrez, N.J.A, INDIRA [place]ura.ANGELICA, & Telma Brewer, M.A. (2004.) Assessment of post-stroke quality of life in cost-effectiveness studies: The usefulness of the Barthel Index and the EuroQoL-5D.  Quality of Life Research, 15, 428-96           Physical Therapy Evaluation Charge Determination   History Examination Presentation Decision-Making   HIGH Complexity :3+ comorbidities / personal factors will impact the outcome/ POC  HIGH Complexity : 4+ Standardized tests and measures addressing body structure, function, activity limitation and / or participation in recreation  LOW Complexity : Stable, uncomplicated  Other outcome measures Barthel  MEDIUM      Based on the above components, the patient evaluation is determined to be of the following complexity level: LOW     Pain Rating:  Reported L knee pain (present at baseline)    Activity Tolerance:   Poor    After treatment patient left in no apparent distress:   Sitting in chair, Call bell within reach, and Bed / chair alarm activated    COMMUNICATION/EDUCATION:   The patients plan of care was discussed with: Occupational therapist and Registered nurse. Fall prevention education was provided and the patient/caregiver indicated understanding., Patient/family have participated as able in goal setting and plan of care. , and Patient/family agree to work toward stated goals and plan of care.     Thank you for this referral.  Lucille Yeboah, PT, DPT   Time Calculation: 22 mins

## 2021-11-02 NOTE — PROGRESS NOTES
Problem: Self Care Deficits Care Plan (Adult)  Goal: *Acute Goals and Plan of Care (Insert Text)  Description:   FUNCTIONAL STATUS PRIOR TO ADMISSION:  Patient endorses difficulty completing ADLs at home and did not have assistance available. She reports she mobilized at wheelchair level, was non-ambulatory, self-propelled wheelchair without assistance, and performed stand-pivot transfers without assistance (at 180 degree angle). Denies recent falls. HOME SUPPORT: Patient lived alone and did not have assistance available. Occupational Therapy Goals  Initiated 11/2/2021  1. Patient will perform grooming with supervision/set-up within 7 day(s). 2.  Patient will perform bathing with minimal assistance within 7 day(s). 3.  Patient will perform upper body dressing with minimal assistance within 7 day(s). 4.  Patient will perform lower body dressing with moderate assistance within 7 day(s)  5. Patient will perform toilet transfers with minimal assistance to [address] within 7 day(s). 6.  Patient will perform all aspects of toileting with moderate assistance  within 7 day(s). 7.  Patient will participate in upper extremity therapeutic exercise/activities with minimal assistance for 10 minutes within 7 day(s). 8.  Patient will utilize fall prevention techniques during functional activities with verbal cues within 7 day(s). Outcome: Not Met    OCCUPATIONAL THERAPY EVALUATION  Patient: Socrates Lorenzana (64 y.o. female)  Date: 11/2/2021  Primary Diagnosis: Cellulitis and abscess of lower extremity [L03.119, L02.419]  UTI (urinary tract infection) [N39.0]        Precautions:      ASSESSMENT  Note patient admitted 10/29/21 for inability to care for self at home, leg wounds, fever, sepsis, and UTI. Per chart review, patient was receiving home health PT/OT but did not recall these services today when questioned. She endorses difficulty completing ADLs at home and did not have assistance available.   She reports she mobilized at wheelchair level, was non-ambulatory, self-propelled wheelchair without assistance, and performed stand-pivot transfers without assistance (at 180 degree angle). Denies recent falls. Patient received for OT evaluation today A&Ox2 with debility, impaired UB and LB reach for ADLs (b/l shoulder flexion limited to ~40 degrees, which she reports at baseline) and impaired balance. Patient was agreeable to mobilize OOB with encouragement and required moderate assistance x2 for sit-stand with assistance for hip extension and minimum assistance x2/ b/l HHA to pivot to chair. She endorses her mobility below baseline, which would be expected since she has not mobilized OOB since admission. Given ADL difficulty at baseline, acute decline, and limited home support, recommend SNF at d/c. Current Level of Function Impacting Discharge (ADLs/self-care): moderate assistance x2 sit-stand, minimum assistance x2 pivot to chair. Infer overall set-up to moderate assistance UB ADLs and total assistance LB ADLs. Functional Outcome Measure: The patient scored 35/100 on the Barthel Index outcome measure which is indicative of ~65% impairment in functional performance. Patient will benefit from skilled therapy intervention to address the above noted impairments. PLAN :  Recommendations and Planned Interventions: self care training, functional mobility training, therapeutic exercise, balance training, therapeutic activities, endurance activities, patient education, home safety training, and family training/education    Frequency/Duration: Patient will be followed by occupational therapy 3 times a week to address goals.     Recommendation for discharge: (in order for the patient to meet his/her long term goals)  Therapy up to 5 days/week in SNF setting    This discharge recommendation:  Has been made in collaboration with the attending provider and/or case management         SUBJECTIVE:   Patient stated That was harder [than normal].     OBJECTIVE DATA SUMMARY:   HISTORY:   Past Medical History:   Diagnosis Date    Anxiety disorder     Atrial fibrillation (HCC)     CAD (coronary artery disease) 2007    stents, CABG x 3v    Carotid stenosis     Cervical stenosis of spinal canal 07/2019    Chronic kidney disease     Cough     CVA (cerebral vascular accident) ([facility] [address] 07/2019    left lacunar infarct at head of caudate    Depression     AND CHRONIC ANXIETY    Diabetes ([facility] [address]     GERD (gastroesophageal reflux disease)     High cholesterol     History of peptic ulcer     Bleeding ulcer with increased NSAID use    Hypertension     Left carotid stenosis 07/2019    s/p left CEA with Dr. Eric Pablo    MI, old 2007    PUD (peptic ulcer disease)     Stroke ([facility] [address]     Tremor     Valvular heart disease      Past Surgical History:   Procedure Laterality Date    HX CAROTID ENDARTERECTOMY  07/2019    HX CORONARY ARTERY BYPASS GRAFT      HX TONSILLECTOMY  1963    MT CARDIAC SURG PROCEDURE UNLIST      CABG X3 VESSEL    MT TOTAL KNEE ARTHROPLASTY Right 2015       Expanded or extensive additional review of patient history:     Home Situation  Home Environment: Independent living  One/Two Story Residence: One story  Living Alone: Yes  Support Systems:  (neice drives her to apts)  Current DME Used/Available at Home: Wheelchair, Walker, rolling  Tub or Shower Type:  (sponge bath)    EXAMINATION OF PERFORMANCE DEFICITS:  Cognitive/Behavioral Status:  Neurologic State: Alert  Orientation Level: Oriented to person;Oriented to place; Disoriented to situation;Disoriented to time  Cognition: Follows commands             Skin: visible skin appears intact, BLE wounds wrapped    Edema: none noted    Hearing:   Auditory  Auditory Impairment: None    Vision/Perceptual:                   Visual Fields:  (able to detect stimuli in all fields)       Acuity: Within Defined Limits         Range of Motion:    AROM: Grossly decreased, non-functional Strength:    Strength: Generally decreased, functional                Coordination:  Coordination: Generally decreased, functional  Fine Motor Skills-Upper: Left Impaired;Right Impaired    Gross Motor Skills-Upper: Left Impaired;Right Impaired    Tone & Sensation:    Tone: Normal                         Balance:  Sitting: Impaired  Sitting - Static: Good (unsupported)  Sitting - Dynamic: Fair (occasional)  Standing: Impaired  Standing - Static: Fair;Constant support  Standing - Dynamic : Poor;Constant support    Functional Mobility and Transfers for ADLs:    Transfers:  Sit to Stand: Moderate assistance;Assist x2  Stand to Sit: Minimum assistance;Assist x2  Bed to Chair: Minimum assistance;Assist x2    ADL Assessment:  Feeding: Setup (inferred )    Oral Facial Hygiene/Grooming: Minimum assistance (inferred for impaired shoulder AROM/ reach)    Bathing: Maximum assistance (inferred for AROM)    Upper Body Dressing: Moderate assistance (inferred)    Lower Body Dressing: Total assistance (for socks and inferred overall)    Toileting: Total assistance (inferred)            Functional Measure:    Barthel Index:  Bathin  Bladder: 5  Bowels: 5  Groomin  Dressin  Feeding: 10  Mobility: 0  Stairs: 0  Toilet Use: 0  Transfer (Bed to Chair and Back): 10  Total: 35/100      The Barthel ADL Index: Guidelines  1. The index should be used as a record of what a patient does, not as a record of what a patient could do. 2. The main aim is to establish degree of independence from any help, physical or verbal, however minor and for whatever reason. 3. The need for supervision renders the patient not independent. 4. A patient's performance should be established using the best available evidence. Asking the patient, friends/relatives and nurses are the usual sources, but direct observation and common sense are also important. However direct testing is not needed.   5. Usually the patient's performance over the preceding 24-48 hours is important, but occasionally longer periods will be relevant. 6. Middle categories imply that the patient supplies over 50 per cent of the effort. 7. Use of aids to be independent is allowed. Score Interpretation (from [facility])    Independent   60-79 Minimally independent   40-59 Partially dependent   20-39 Very dependent   <20 Totally dependent     -Ramesh Pace., BarthelHITESH. (1965). Functional evaluation: the Barthel Index. [address] ([address] (1997). The Barthel activities of daily living index: self-reporting versus actual performance in the old (> or = 75 years). Journal of [address] [place] [address] 457), [facility], FANTA, Darian Gomez., Agustín Hankins. (1999). Measuring the change in disability after inpatient rehabilitation; comparison of the responsiveness of the Barthel Index and Functional [place] Measure. Journal of Neurology, Neurosurgery, and Psychiatry, 66(4), 703-249. Manav Trinh, N.J.A, APARNA Ventura, & Awilda Briggs MSELENA. (2004) Assessment of post-stroke quality of life in cost-effectiveness studies: The usefulness of the Barthel Index and the EuroQoL-5D. Quality of Life Research, 15, 234-31         Occupational Therapy Evaluation Charge Determination   History Examination Decision-Making   LOW Complexity : Brief history review  MEDIUM Complexity : 3-5 performance deficits relating to physical, cognitive , or psychosocial skils that result in activity limitations and / or participation restrictions MEDIUM Complexity : Patient may present with comorbidities that affect occupational performnce.  Miniml to moderate modification of tasks or assistance (eg, physical or verbal ) with assesment(s) is necessary to enable patient to complete evaluation       Based on the above components, the patient evaluation is determined to be of the following complexity level: LOW   Pain Rating:  Patient reported knee pain, which is normal for her    Activity Tolerance:   Fair    After treatment patient left in no apparent distress:    Sitting in chair, Call bell within reach, and Bed / chair alarm activated    COMMUNICATION/EDUCATION:   The patients plan of care was discussed with: Physical therapist, Registered nurse, and Case management. Home safety education was provided and the patient/caregiver indicated understanding., Patient/family have participated as able in goal setting and plan of care. , and Patient/family agree to work toward stated goals and plan of care. This patients plan of care is appropriate for delegation to BEL.     Thank you for this referral.  Latasha Avalos OT  Time Calculation: 23 mins

## 2021-11-02 NOTE — PROGRESS NOTES
Transition of Care Plan   RUR- High 30%   DISPOSITION: SNF - Referral pending at [address] started 11/2   F/U with PCP/Specialist     Transport: [facility]    Transition of Care Plan: PT OT and Dr. Maged Valdez recommending SNF at discharge. Patient is open to going to [facility], referral sent in Chris. The Plan for Transition of Care is related to the following treatment goals: SNF    The Patient was provided with a choice of provider and agrees  with the discharge plan. Yes [x] No []    A Freedom of choice list was provided with basic dialogue that supports the patient's individualized plan of care/goals and shares the quality data associated with the providers. Yes [x] No []    Merle Blayne has been started today, 11/2, with requested start date of tomorrow, 11/3. Matteo Walter PL#[id number]. Clinicals have been faxed to [facility]. [facility] [employer] referral sent to [address] [place] [address]  for Medicaid LTC screening. 3:15pm: CM spoke with Ro Alves at [facility], likely to have bed available tomorrow. Plan to touch base in the morning. Jil Lerner) WOODY Funk.

## 2021-11-02 NOTE — PROGRESS NOTES
Nephrology Progress Note  [address] [facility]     [email or web address]  Phone - [phone]   Patient: Nayla Navas    YOB: 1940        Date- 11/2/2021   Admit Date: 10/29/2021  CC: Follow up for moreno          IMPRESSION & PLAN:   · MORENO  due to pre renal factors +/- ATN due to low bp-- bumex use  · Hyponatremia-- likely due to dehydration  · ckd 3 a- cr 2.0 in august 2021---cr 1.4 in august 2021  · Hypertension  · pvd  · Iron defi anemia  · S/p left CEA  · Renal artery dis-   · H/o CHF  · CABG 2015   H/O AAA    PLAN-   Cr improving and close to baseline   D/c IVF today   Cont current BP meds   Daily labs and I/Os   Subjective: Interval History:   Seen and examined. Cr stable. No cp, sob, n/v/d. Still w/ poor po intake. Objective:   Vitals:    11/02/21 0646 11/02/21 0821 11/02/21 0832 11/02/21 1004   BP: 138/66 (!) 155/61     Pulse: (!) 57 69  65   Resp: 16      Temp: 98.2 °F (36.8 °C)      SpO2: 99%  95%    Weight: 78 kg (171 lb 15.3 oz)         No intake/output data recorded. Last 3 Recorded Weights in this Encounter    10/31/21 0828 11/01/21 0633 11/02/21 0646   Weight: 66.4 kg (146 lb 6.2 oz) 68.6 kg (151 lb 3.8 oz) 78 kg (171 lb 15.3 oz)      Physical exam:   GEN: NAD  NECK- Supple, no mass  RESP: No wheezing, Clear b/l  CVS: S1,S2  RRR  NEURO: Normal speech, Non focal  PSYCH: Normal Mood    Chart reviewed. Pertinent Notes reviewed. Data Review :  Recent Labs     11/02/21  0257 11/01/21  0045 11/01/21  0044 10/31/21  0103 10/31/21  0103    139 139   < > 138   K 4.2 4.2 4.2   < > 4.3   * 115* 115*   < > 113*   CO2 20* 19* 20*   < > 20*   BUN 25* 30* 31*   < > 39*   CREA 1.55* 1.87* 1.82*   < > 1.89*   * 142* 145*   < > 145*   CA 8.7 9.1 8.9   < > 8.4*   PHOS  --  2.9  --   --  3.1    < > = values in this interval not displayed.      Recent Labs     11/02/21  0257 11/01/21  0044 10/31/21  1142   WBC 6.2 8.3  --    HGB 8.5* 8.9* 9.0*   HCT 25.9* 27.8* 28.2*    287  --      Recent Labs     10/31/21  0103   TIBC 140*   PSAT 9*   FERR 136      Medication list  reviewed  Current Facility-Administered Medications   Medication Dose Route Frequency    cefTRIAXone (ROCEPHIN) 1 g in sterile water (preservative free) 10 mL IV syringe  1 g IntraVENous Q24H    hydrALAZINE (APRESOLINE) tablet 50 mg  50 mg Oral TID    carvediloL (COREG) tablet 3.125 mg  3.125 mg Oral BID WITH MEALS    pantothenic ac-min oil-pet,hyd (AQUAPHOR) 41 % ointment   Topical DAILY    pantoprazole (PROTONIX) tablet 40 mg  40 mg Oral ACB&D    hydrALAZINE (APRESOLINE) 20 mg/mL injection 20 mg  20 mg IntraVENous Q6H PRN    sodium chloride (NS) flush 5-10 mL  5-10 mL IntraVENous PRN    sodium chloride (NS) flush 5-40 mL  5-40 mL IntraVENous Q8H    sodium chloride (NS) flush 5-40 mL  5-40 mL IntraVENous PRN    acetaminophen (TYLENOL) tablet 650 mg  650 mg Oral Q6H PRN    Or    acetaminophen (TYLENOL) suppository 650 mg  650 mg Rectal Q6H PRN    polyethylene glycol (MIRALAX) packet 17 g  17 g Oral DAILY PRN    ondansetron (ZOFRAN ODT) tablet 4 mg  4 mg Oral Q8H PRN    Or    ondansetron (ZOFRAN) injection 4 mg  4 mg IntraVENous Q6H PRN    0.9% sodium chloride infusion  75 mL/hr IntraVENous CONTINUOUS    apixaban (ELIQUIS) tablet 2.5 mg  2.5 mg Oral BID    ARIPiprazole (ABILIFY) tablet 2 mg  2 mg Oral DAILY    atorvastatin (LIPITOR) tablet 40 mg  40 mg Oral QHS    busPIRone (BUSPAR) tablet 5 mg  5 mg Oral TID    carbidopa-levodopa (SINEMET)  mg per tablet 2 Tablet  2 Tablet Oral QID    clopidogreL (PLAVIX) tablet 75 mg  75 mg Oral DAILY AFTER BREAKFAST    [Held by provider] melatonin tablet 6 mg  6 mg Oral QHS    therapeutic multivitamin (THERAGRAN) tablet 1 Tablet  1 Tablet Oral DAILY    nitroglycerin (NITROSTAT) tablet 0.4 mg  0.4 mg SubLINGual Q5MIN PRN          Talha Garcia MD              [facility]  [facility]  [address]  [place], [address]  Phone - [phone]               Fax - [phone]

## 2021-11-02 NOTE — PROGRESS NOTES
[facility]                                                                                          Hospitalist Progress Note          Date of Service:  2021  NAME:  Norberto Deleon  :  1940  MRN:  [id number]        Admission Summary:   \"an 69-year-old female extremely poor historian. On reviewing the chart, the patient has a history of hypertension, atrial fibrillation, currently on Eliquis for the same. The patient has not been able to take care of herself. Also the patient was sent by her power of  for generalized weakness, failure to thrive, and the patient was not feeling well. The patient upon arrival to the ER was found to have a temperature of 102 and was found to be hypotensive. The labs showed elevated WBC count of 18,000 and creatinine of 2.3. The patient's baseline creatinine is 1.9. The patient was not confused. The other labs that were ordered showed a CT scan which was negative. The urine showed significant urinary tract infection. The patient is currently being admitted for the management of this condition. On further questioning, the patient does complain of some swelling of the bilateral lower extremities which has recently been getting worse, does have a history of peripheral vascular disease, for which she has been taking Pletal for the same. Denies complaints of any chest pain or chest pressure or shortness of breath. Denies complaints of any nausea, vomiting, or diarrhea. Denies complaints of any urgency, frequency, burning of urination. continues to have low mood. Denies having any suicidal or homicidal ideations. \"       Interval history / Subjective:   No acute complaints, denies pain, n/v/d, dyspnea     Assessment & Plan:     Sepsis   Likely coming from UTI, lower-extremities do not appear cellulitic  Continue IV ceftriaxone  Urine cultures w/ proteus, sensitivities pending  Blood culture negative growth thus far    LE: wound care consult    MORENO on CKD   Improved    Afib with rvr, HR has been on lower end while inpatient  Rate controlled  On apixaban    Hypertension in setting of afib hx which her HR has been on lower end while inpatient  Use prn hydralazine for now and adjust as needed  Resume beta blocker - monitor HR (was bradycardic)    Parkinsons disease     History of stroke, stable. No report of neurologic change  Stable, on antiplatelet tx and statin    History of peptic ulcer disease. Asymptomatic  On ppi    History of depression, anxiety. Dyslipidemia. CAD     Code status: full  DVT prophylaxis: anticoagulated    Care Plan discussed with: Patient/Family  Anticipated Disposition: Home w/Family  Anticipated Discharge: 24 hours to 48 hours     Dispo: pending abx sensitivities, therapy evals - will likely need placement     Hospital Problems  Date Reviewed: 8/26/2021        Codes Class Noted POA    Cellulitis and abscess of lower extremity ICD-10-CM: L03.119, L02.419  ICD-9-CM: 682.6  10/29/2021 Unknown        UTI (urinary tract infection) ICD-10-CM: N39.0  ICD-9-CM: 599.0  4/9/2021 Unknown                Review of Systems:   Pertinent items are noted in HPI. Vital Signs:    Last 24hrs VS reviewed since prior progress note.  Most recent are:  Visit Vitals  /67 (BP 1 Location: Left upper arm, BP Patient Position: At rest)   Pulse (!) 59   Temp 98.9 °F (37.2 °C)   Resp 20   Wt 78 kg (171 lb 15.3 oz)   SpO2 96%   BMI 28.62 kg/m²       No intake or output data in the 24 hours ending 11/02/21 1806     Physical Examination:     I had a face to face encounter with this patient and independently examined them on 11/02/21 as outlined below:     General: nad  Eyes: anicteric  ENT: no discharge, supple  Cardiac: no rubs, s1s2, rrr  Chest: no accessory muscle use  Pulm: cta b/l, normal wob  Abd: soft, no guarding, bs heard  Ext: no pitting edema, bilateral LE wrapped, pulses present  Neuro: grossly non-focal  Psych: not anxious nor agitated          Data Review:         Labs:     Recent Labs     11/02/21 0257 11/01/21 0044   WBC 6.2 8.3   HGB 8.5* 8.9*   HCT 25.9* 27.8*    287     Recent Labs     11/02/21 0257 11/01/21 0045 11/01/21  0044 10/31/21  0103 10/31/21  0103    139 139   < > 138   K 4.2 4.2 4.2   < > 4.3   * 115* 115*   < > 113*   CO2 20* 19* 20*   < > 20*   BUN 25* 30* 31*   < > 39*   CREA 1.55* 1.87* 1.82*   < > 1.89*   * 142* 145*   < > 145*   CA 8.7 9.1 8.9   < > 8.4*   PHOS  --  2.9  --   --  3.1    < > = values in this interval not displayed. Recent Labs     11/01/21  0045 10/31/21  0103   ALB 2.0* 2.0*     No results for input(s): INR, PTP, APTT, INREXT, INREXT in the last 72 hours. Recent Labs     10/31/21  0103   TIBC 140*   PSAT 9*   FERR 136      Lab Results   Component Value Date/Time    Folate 19.7 03/31/2021 03:55 AM      No results for input(s): PH, PCO2, PO2 in the last 72 hours. No results for input(s): CPK, CKNDX, TROIQ in the last 72 hours.     No lab exists for component: CPKMB  Lab Results   Component Value Date/Time    Cholesterol, total 148 09/23/2020 04:27 AM    HDL Cholesterol 52 09/23/2020 04:27 AM    LDL, calculated 83.8 09/23/2020 04:27 AM    Triglyceride 61 09/23/2020 04:27 AM    CHOL/HDL Ratio 2.8 09/23/2020 04:27 AM     Lab Results   Component Value Date/Time    Glucose (POC) 120 (H) 04/24/2021 04:54 PM    Glucose (POC) 176 (H) 04/24/2021 11:16 AM    Glucose (POC) 111 (H) 04/24/2021 08:45 AM    Glucose (POC) 138 (H) 04/02/2021 11:45 AM    Glucose (POC) 107 (H) 04/01/2021 09:16 PM     Lab Results   Component Value Date/Time    Color YELLOW/STRAW 10/29/2021 07:16 PM    Appearance CLOUDY (A) 10/29/2021 07:16 PM    Specific gravity 1.009 10/29/2021 07:16 PM    pH (UA) 5.0 10/29/2021 07:16 PM    Protein 100 (A) 10/29/2021 07:16 PM    Glucose Negative 10/29/2021 07:16 PM    Ketone Negative 10/29/2021 07:16 PM    Bilirubin Negative 10/29/2021 07:16 PM    Urobilinogen 0.2 10/29/2021 07:16 PM    Nitrites Negative 10/29/2021 07:16 PM    Leukocyte Esterase MODERATE (A) 10/29/2021 07:16 PM    Epithelial cells MODERATE (A) 10/29/2021 07:16 PM    Bacteria 2+ (A) 10/29/2021 07:16 PM    WBC 20-50 10/29/2021 07:16 PM    RBC 0-5 10/29/2021 07:16 PM         Medications Reviewed:     Current Facility-Administered Medications   Medication Dose Route Frequency    cefTRIAXone (ROCEPHIN) 1 g in sterile water (preservative free) 10 mL IV syringe  1 g IntraVENous Q24H    hydrALAZINE (APRESOLINE) tablet 50 mg  50 mg Oral TID    carvediloL (COREG) tablet 3.125 mg  3.125 mg Oral BID WITH MEALS    pantothenic ac-min oil-pet,hyd (AQUAPHOR) 41 % ointment   Topical DAILY    pantoprazole (PROTONIX) tablet 40 mg  40 mg Oral ACB&D    hydrALAZINE (APRESOLINE) 20 mg/mL injection 20 mg  20 mg IntraVENous Q6H PRN    sodium chloride (NS) flush 5-10 mL  5-10 mL IntraVENous PRN    sodium chloride (NS) flush 5-40 mL  5-40 mL IntraVENous Q8H    sodium chloride (NS) flush 5-40 mL  5-40 mL IntraVENous PRN    acetaminophen (TYLENOL) tablet 650 mg  650 mg Oral Q6H PRN    Or    acetaminophen (TYLENOL) suppository 650 mg  650 mg Rectal Q6H PRN    polyethylene glycol (MIRALAX) packet 17 g  17 g Oral DAILY PRN    ondansetron (ZOFRAN ODT) tablet 4 mg  4 mg Oral Q8H PRN    Or    ondansetron (ZOFRAN) injection 4 mg  4 mg IntraVENous Q6H PRN    apixaban (ELIQUIS) tablet 2.5 mg  2.5 mg Oral BID    ARIPiprazole (ABILIFY) tablet 2 mg  2 mg Oral DAILY    atorvastatin (LIPITOR) tablet 40 mg  40 mg Oral QHS    busPIRone (BUSPAR) tablet 5 mg  5 mg Oral TID    carbidopa-levodopa (SINEMET)  mg per tablet 2 Tablet  2 Tablet Oral QID    clopidogreL (PLAVIX) tablet 75 mg  75 mg Oral DAILY AFTER BREAKFAST    [Held by provider] melatonin tablet 6 mg  6 mg Oral QHS    therapeutic multivitamin (THERAGRAN) tablet 1 Tablet  1 Tablet Oral DAILY    nitroglycerin (NITROSTAT) tablet 0.4 mg  0.4 mg SubLINGual Q5MIN PRN     ______________________________________________________________________  EXPECTED LENGTH OF STAY: 4d 19h  ACTUAL LENGTH OF STAY:          4                 Darian Barth MD

## 2021-11-03 VITALS
OXYGEN SATURATION: 36 % | WEIGHT: 182.54 LBS | SYSTOLIC BLOOD PRESSURE: 143 MMHG | RESPIRATION RATE: 19 BRPM | TEMPERATURE: 98.8 F | DIASTOLIC BLOOD PRESSURE: 50 MMHG | BODY MASS INDEX: 30.38 KG/M2 | HEART RATE: 57 BPM

## 2021-11-03 LAB
ALBUMIN SERPL-MCNC: 1.9 G/DL (ref 3.5–5)
ANION GAP SERPL CALC-SCNC: 4 MMOL/L (ref 5–15)
BACTERIA SPEC CULT: NORMAL
BACTERIA SPEC CULT: NORMAL
BUN SERPL-MCNC: 26 MG/DL (ref 6–20)
BUN/CREAT SERPL: 17 (ref 12–20)
CALCIUM SERPL-MCNC: 9.1 MG/DL (ref 8.5–10.1)
CHLORIDE SERPL-SCNC: 116 MMOL/L (ref 97–108)
CO2 SERPL-SCNC: 20 MMOL/L (ref 21–32)
COVID-19 RAPID TEST, COVR: NOT DETECTED
CREAT SERPL-MCNC: 1.55 MG/DL (ref 0.55–1.02)
GLUCOSE SERPL-MCNC: 110 MG/DL (ref 65–100)
PHOSPHATE SERPL-MCNC: 3.7 MG/DL (ref 2.6–4.7)
POTASSIUM SERPL-SCNC: 4.3 MMOL/L (ref 3.5–5.1)
SERVICE CMNT-IMP: NORMAL
SERVICE CMNT-IMP: NORMAL
SODIUM SERPL-SCNC: 140 MMOL/L (ref 136–145)
SOURCE, COVRS: NORMAL

## 2021-11-03 PROCEDURE — 80069 RENAL FUNCTION PANEL: CPT

## 2021-11-03 PROCEDURE — 74011250637 HC RX REV CODE- 250/637: Performed by: INTERNAL MEDICINE

## 2021-11-03 PROCEDURE — 36415 COLL VENOUS BLD VENIPUNCTURE: CPT

## 2021-11-03 PROCEDURE — 87635 SARS-COV-2 COVID-19 AMP PRB: CPT

## 2021-11-03 PROCEDURE — 74011250637 HC RX REV CODE- 250/637: Performed by: HOSPITALIST

## 2021-11-03 PROCEDURE — 74011250636 HC RX REV CODE- 250/636: Performed by: INTERNAL MEDICINE

## 2021-11-03 RX ADMIN — CARBIDOPA AND LEVODOPA 2 TABLET: 25; 100 TABLET ORAL at 12:25

## 2021-11-03 RX ADMIN — CARBIDOPA AND LEVODOPA 2 TABLET: 25; 100 TABLET ORAL at 08:53

## 2021-11-03 RX ADMIN — BUSPIRONE HYDROCHLORIDE 5 MG: 5 TABLET ORAL at 08:52

## 2021-11-03 RX ADMIN — CARVEDILOL 3.12 MG: 3.12 TABLET, FILM COATED ORAL at 08:52

## 2021-11-03 RX ADMIN — Medication 10 ML: at 06:00

## 2021-11-03 RX ADMIN — ACETAMINOPHEN 650 MG: 325 TABLET ORAL at 10:35

## 2021-11-03 RX ADMIN — CLOPIDOGREL BISULFATE 75 MG: 75 TABLET ORAL at 08:53

## 2021-11-03 RX ADMIN — Medication 10 ML: at 14:45

## 2021-11-03 RX ADMIN — HYDRALAZINE HYDROCHLORIDE 20 MG: 20 INJECTION INTRAMUSCULAR; INTRAVENOUS at 05:52

## 2021-11-03 RX ADMIN — PANTOPRAZOLE SODIUM 40 MG: 40 TABLET, DELAYED RELEASE ORAL at 07:55

## 2021-11-03 RX ADMIN — THERA TABS 1 TABLET: TAB at 08:53

## 2021-11-03 RX ADMIN — APIXABAN 2.5 MG: 2.5 TABLET, FILM COATED ORAL at 08:53

## 2021-11-03 RX ADMIN — ARIPIPRAZOLE 2 MG: 2 TABLET ORAL at 09:39

## 2021-11-03 RX ADMIN — WHITE PETROLATUM: 1.75 OINTMENT TOPICAL at 09:39

## 2021-11-03 NOTE — PROGRESS NOTES
Nephrology Progress Note  Gaetana Pallas  Date of Admission : 10/29/2021    CC: Follow up for MORENO on CKD       Assessment and Plan     MORENO on CKD:  - 2/2 ATN from hypotension/sepsis + diuretic use  - Cr at [facility]  - cont present care    CKD IIIa:  - baseline Cr around 1.4  - presumed chronic HTN    Urosepsis:  - on IV abx    HTN:  - BP stable    Parkinsons  Hx of CVA  Afib with RVR  PAD w/ L CEA  CAD s/p CABG  AAA  HFpEF       Interval History:  Seen and examined. Current Medications: all current  Medications have been eviewed in EPIC  Review of Systems: Pertinent items are noted in HPI. Objective:  Vitals:    Vitals:    11/03/21 0400 11/03/21 0552 11/03/21 0553 11/03/21 0610   BP:  (!) 188/56     Pulse: 64 60 (!) 48    Resp:  19     Temp:  97.5 °F (36.4 °C)     SpO2:       Weight:    82.8 kg (182 lb 8.7 oz)     Intake and Output:  No intake/output data recorded. No intake/output data recorded. Physical Examination:    General: NAD,Conversant   Neck:  Supple, no mass  Resp:  Lungs CTA B/L, no wheezing , normal respiratory effort  CV:  RRR,  no murmur or rub, trace LE edema  GI:  Soft, NT, + Bowel sounds, no hepatosplenomegaly  Neurologic:  Non focal  Psych:             AAO x 3 appropriate affect   Skin:  No Rash  :  No pittman    []    High complexity decision making was performed  []    Patient is at high-risk of decompensation with multiple organ involvement    Lab Data Personally Reviewed: I have reviewed all the pertinent labs, microbiology data and radiology studies during assessment.     Recent Labs     11/03/21  0115 11/02/21 0257 11/01/21  0045    142 139   K 4.3 4.2 4.2   * 118* 115*   CO2 20* 20* 19*   * 115* 142*   BUN 26* 25* 30*   CREA 1.55* 1.55* 1.87*   CA 9.1 8.7 9.1   PHOS 3.7  --  2.9   ALB 1.9*  --  2.0*     Recent Labs     11/02/21  0257 11/01/21  0044 10/31/21  1142   WBC 6.2 8.3  --    HGB 8.5* 8.9* 9.0*   HCT 25.9* 27.8* 28.2*    287  --      No results found for: [facility]  Lab Results   Component Value Date/Time    Culture result: PROTEUS MIRABILIS (A) 10/29/2021 07:16 PM    Culture result: NO GROWTH 5 DAYS 10/29/2021 06:37 PM    Culture result: NO GROWTH 5 DAYS 10/29/2021 06:37 PM     Recent Results (from the past 24 hour(s))   RENAL FUNCTION PANEL    Collection Time: 11/03/21  1:15 AM   Result Value Ref Range    Sodium 140 136 - 145 mmol/L    Potassium 4.3 3.5 - 5.1 mmol/L    Chloride 116 (H) 97 - 108 mmol/L    CO2 20 (L) 21 - 32 mmol/L    Anion gap 4 (L) 5 - 15 mmol/L    Glucose 110 (H) 65 - 100 mg/dL    BUN 26 (H) 6 - 20 MG/DL    Creatinine 1.55 (H) 0.55 - 1.02 MG/DL    BUN/Creatinine ratio 17 12 - 20      GFR est AA 39 (L) >60 ml/min/1.73m2    GFR est non-AA 32 (L) >60 ml/min/1.73m2    Calcium 9.1 8.5 - 10.1 MG/DL    Phosphorus 3.7 2.6 - 4.7 MG/DL    Albumin 1.9 (L) 3.5 - 5.0 g/dL                   Gonzalo Jensen MD  [address]  Phone - [phone]   Fax - [phone]  www. [email or web address]

## 2021-11-03 NOTE — PROGRESS NOTES
Transition of Care Plan to SNF/Rehab    SNF/Rehab Transition:  Patient has been accepted to [facility] and meets criteria for admission. Patient will transported by [facility] and expected to leave at 1500. Communication to Patient/Family:  Met with patient  and they are agreeable to the transition plan. Communication to SNF/Rehab:  Bedside RN, Dana Murillo, has been notified to update the transition plan to the facility and call report (phone number [phone] jasso wing). Discharge information has been updated on the AVS.     Discharge instructions to be fax'd to facility at [facility] # [id number]). Nursing Please include all hard scripts for controlled substances, med rec and dc summary, and AVS in packet.      Reviewed and confirmed with facility, [facility], can manage the patient care needs for the following:     SNF/Rehab Transition:  Patient to follow-up with Home Health: none)  PCP/Specialist: Dr. Kimmy Arias and confirmed with facility, [facility] they can manage the patient care needs for the following:     Ana Laura Ala with (X) only those applicable:    Medication:  [x]  Medications will be available at the facility  []  IV Antibiotics   []  Controlled Substance - hard copy to be sent with patient   [x]  Weekly Labs   Documents:  [x] Hard RX  [x] MAR  [x] Kardex  [x] AVS  [x]Transfer Summary  [x]Discharge   Equipment:  []  CPAP/BiPAP  []  Wound Vacuum  []  Sue or Urinary Device  []  PICC/Central Line  []  Nebulizer  []  Ventilator   Treatment:  []Isolation (for MRSA, VRE, etc.)  []Surgical Drain Management  []Tracheostomy Care  []Dressing Changes  []Dialysis with transportation and chair time   []PEG Care  []Oxygen  []Daily Weights for Heart Failure   Dietary:  []Any diet limitations  []Tube Feedings   []Total Parenteral Management (TPN)   Eligible for Medicaid Long Term Services and Supports  Yes: - [employer] TO SCREEN  [] Eligible for medical assistance or will become eligible within 180 days and UAI completed. [] Provider/Patient and/or support system has requested screening. [x] UAI copy provided to patient or responsible party, [email or web address]  [] UAI unavailable at discharge will send once processed to SNF provider. [] UAI unavailable at discharged mailed to patient  No:   [x] Private pay and is not financially eligible for Medicaid within the next 180 days. [] Reside out-of-state. [] A residents of a state owned/operated facility that is licensed  by [address] [employer] [facility] or [facility]  [] Enrollment in [facility] hospice services  [] [address] [place] [address]  [] Patient /Family declines to have screening completed or provide financial information for screening     Financial Resources:  Medicaid    [] Initiated and application pending   [] Full coverage     Advanced Care Plan:  []Surrogate Decision Maker of Care Maribeth Rosa [phone]  [x]POA  Gabriel Calvin  [x]Communicated Code Status FULL   Other     ANGELICA Perez.S.W.

## 2021-11-03 NOTE — DISCHARGE SUMMARY
Discharge Summary     Patient: Yulissa Franks MRN: [id number]  SSN: [id number]    YOB: 1940  Age: [de-identified] y.o. Sex: female       Admit Date: 10/29/2021    Discharge Date: 11/3/2021      Admission Diagnoses: Cellulitis and abscess of lower extremity [L03.119, L02.419]  UTI (urinary tract infection) [N39.0]    Discharge Diagnoses:   Sepsis due to UTI  MORENO    Chronic diagnoses:  Parkinson's disease  CAD  CKD III  Paroxysmal afib  HTN  History of CVA  Hyperlipidemia  History of PUD       Discharge Condition: Stable    Hospital Course: [de-identified] y.o lady who was admitted with generalized weakness and inability to care for herself. She was found to have a UTI and MORENO on admission with concerns for sepsis. Sepsis   Due to UTI  Treated w/ IV ceftriaxone; urine culture w/ proteus     MORENO on CKD III: felt to be pre-renal and due to diuretics  -diuretics stopped  -resolved w/ IV fluids    Afib w/ rvr:  -rate controlled and at times bradycardic. On low-dose carvedilol and Eliquis. Lower-extremity wounds: initial concern for cellulitis however this has been ruled out  -wound care    Hypertension:    Parkinson's disease    History of stroke    History of PUD    CAD    Dyslipidemia    Consults: Nephrology    Significant Diagnostic Studies: see above  CT ABD PELV WO CONT    Result Date: 10/29/2021  Cholelithiasis. No acute abnormality. XR CHEST PORT    Result Date: 10/29/2021  No acute process. Disposition: SNF    S: offers no complaints, denies dyspnea, n/v/d, pain. NAD  Heart RR bradycardic  Lungs CTAB  Abd soft NT  Extr LE wrapped      Discharge Medications:   Current Discharge Medication List      CONTINUE these medications which have NOT CHANGED    Details   ARIPiprazole (ABILIFY) 20 mg tablet       cilostazoL (PLETAL) 100 mg tablet Take  by mouth Before breakfast and dinner. apixaban (ELIQUIS) 2.5 mg tablet Take 1 Tablet by mouth two (2) times a day.   Qty: 60 Tablet, Refills: 11 amLODIPine (NORVASC) 2.5 mg tablet Take 1 Tablet by mouth daily. Qty: 30 Tablet, Refills: 5      hydrALAZINE (APRESOLINE) 50 mg tablet Take 1 Tablet by mouth three (3) times daily. Qty: 90 Tablet, Refills: 5      atorvastatin (LIPITOR) 40 mg tablet Take 1 Tablet by mouth nightly. Qty: 30 Tablet, Refills: 5      carvediloL (COREG) 3.125 mg tablet Take 1 Tablet by mouth two (2) times daily (with meals). Qty: 60 Tablet, Refills: 5      busPIRone (BUSPAR) 5 mg tablet Take 1 Tab by mouth three (3) times daily. Qty: 90 Tab, Refills: 0      gabapentin (NEURONTIN) 100 mg capsule Take 100 mg by mouth nightly. melatonin 3 mg tablet Take 6 mg by mouth nightly. potassium chloride SR (KLOR-CON 10) 10 mEq tablet Take 20 mEq by mouth daily. nitroglycerin (Nitrostat) 0.4 mg SL tablet 0.4 mg by SubLINGual route every five (5) minutes as needed for Chest Pain. Up to 3 doses. clopidogreL (Plavix) 75 mg tab Take 75 mg by mouth daily (after breakfast). carbidopa-levodopa (SINEMET)  mg per tablet Take 2 Tabs by mouth four (4) times daily. Qty: 720 Tab, Refills: 1      acetaminophen (TYLENOL) 325 mg tablet Take 650 mg by mouth every six (6) hours as needed for Pain or Fever. cyanocobalamin (VITAMIN B12) 100 mcg tablet Take 100 mcg by mouth daily. vit C,F-Uy-ijqqf-lutein-zeaxan (PRESERVISION AREDS-2) 207-705-61-1 mg-unit-mg-mg cap capsule Take 1 Cap by mouth two (2) times a day. senna-docusate (SENNA WITH DOCUSATE SODIUM) 8.6-50 mg per tablet Take 1 Tab by mouth nightly. Cholecalciferol, Vitamin D3, 1,000 unit cap Take 1,000 Units by mouth daily. pantoprazole (PROTONIX) 40 mg tablet Take 40 mg by mouth Daily (before breakfast). Indications: h/o bleeding ulcer with nsaid      DULoxetine (CYMBALTA) 60 mg capsule Take 120 mg by mouth daily. Indications: Anxiousness associated with Depression      multivitamins-minerals-lutein (CENTRUM SILVER) tab tablet Take 1 Tab by mouth daily. STOP taking these medications       traMADoL (ULTRAM) 50 mg tablet Comments:   Reason for Stopping:         bumetanide (BUMEX) 1 mg tablet Comments:   Reason for Stopping: Follow-up Information     Follow up With Specialties Details Why Contact Info    Lalito Velasquez DO Family Medicine In 1 week for hospital discharge follow-up [address]            Signed By: Jolanta Parra MD     November 3, 2021      Greater than 30 minutes spent on discharge management.

## 2021-11-03 NOTE — PROGRESS NOTES
TRANSFER - OUT REPORT:    Verbal report given to Cesar Mercedes on Joie De  being transferred to [facility] for routine progression of care       Report consisted of patients Situation, Background, Assessment and Recommendations(SBAR). Information from the following report(s) SBAR, Kardex, [facility], Recent Results, Cardiac Rhythm Sinus Gregory Ripper, Alarm Parameters  and Quality Measures was reviewed with the receiving nurse. Lines: To be discontinued on discharge -  Peripheral IV 10/31/21 Anterior; Left Forearm (Active)   Site Assessment Clean, dry, & intact 10/31/21 2000   Phlebitis Assessment 0 10/31/21 2000   Infiltration Assessment 0 10/31/21 2000   Dressing Status Clean, dry, & intact 10/31/21 2000   Dressing Type Tape; Transparent 10/31/21 2000   Hub Color/Line Status Pink 10/31/21 2000   Action Taken Open ports on tubing capped 10/31/21 2000   Alcohol Cap Used Yes 10/31/21 2000        Opportunity for questions and clarification was provided. Patient to be transported with: Personal belongings.

## 2021-11-03 NOTE — PROGRESS NOTES
Pharmacist Discharge Medication Reconciliation    Discharging Provider: Dr. Paula Villeda PMH:   Past Medical History:   Diagnosis Date    Anxiety disorder     Atrial fibrillation ([facility]     CAD (coronary artery disease) 2007    stents, CABG x 3v    Carotid stenosis     Cervical stenosis of spinal canal 07/2019    Chronic kidney disease     Cough     CVA (cerebral vascular accident) ([facility] 07/2019    left lacunar infarct at head of caudate    Depression     AND CHRONIC ANXIETY    Diabetes ([facility]     GERD (gastroesophageal reflux disease)     High cholesterol     History of peptic ulcer     Bleeding ulcer with increased NSAID use    Hypertension     Left carotid stenosis 07/2019    s/p left CEA with Dr. Chaz Gamino    MI, old 2007    PUD (peptic ulcer disease)     Stroke [facility])     Tremor     Valvular heart disease      Chief Complaint for this Admission:   Chief Complaint   Patient presents with    Fever     Allergies: Patient has no known allergies. Discharge Medications:   Current Discharge Medication List        CONTINUE these medications which have NOT CHANGED    Details   ARIPiprazole (ABILIFY) 20 mg tablet       cilostazoL (PLETAL) 100 mg tablet Take  by mouth Before breakfast and dinner. apixaban (ELIQUIS) 2.5 mg tablet Take 1 Tablet by mouth two (2) times a day. Qty: 60 Tablet, Refills: 11      amLODIPine (NORVASC) 2.5 mg tablet Take 1 Tablet by mouth daily. Qty: 30 Tablet, Refills: 5      hydrALAZINE (APRESOLINE) 50 mg tablet Take 1 Tablet by mouth three (3) times daily. Qty: 90 Tablet, Refills: 5      atorvastatin (LIPITOR) 40 mg tablet Take 1 Tablet by mouth nightly. Qty: 30 Tablet, Refills: 5      carvediloL (COREG) 3.125 mg tablet Take 1 Tablet by mouth two (2) times daily (with meals). Qty: 60 Tablet, Refills: 5      busPIRone (BUSPAR) 5 mg tablet Take 1 Tab by mouth three (3) times daily.   Qty: 90 Tab, Refills: 0      gabapentin (NEURONTIN) 100 mg capsule Take 100 mg by mouth nightly. melatonin 3 mg tablet Take 6 mg by mouth nightly. potassium chloride SR (KLOR-CON 10) 10 mEq tablet Take 20 mEq by mouth daily. nitroglycerin (Nitrostat) 0.4 mg SL tablet 0.4 mg by SubLINGual route every five (5) minutes as needed for Chest Pain. Up to 3 doses. clopidogreL (Plavix) 75 mg tab Take 75 mg by mouth daily (after breakfast). carbidopa-levodopa (SINEMET)  mg per tablet Take 2 Tabs by mouth four (4) times daily. Qty: 720 Tab, Refills: 1      acetaminophen (TYLENOL) 325 mg tablet Take 650 mg by mouth every six (6) hours as needed for Pain or Fever. cyanocobalamin (VITAMIN B12) 100 mcg tablet Take 100 mcg by mouth daily. vit C,W-Lb-libib-lutein-zeaxan (PRESERVISION AREDS-2) 867-388-75-1 mg-unit-mg-mg cap capsule Take 1 Cap by mouth two (2) times a day. senna-docusate (SENNA WITH DOCUSATE SODIUM) 8.6-50 mg per tablet Take 1 Tab by mouth nightly. Cholecalciferol, Vitamin D3, 1,000 unit cap Take 1,000 Units by mouth daily. pantoprazole (PROTONIX) 40 mg tablet Take 40 mg by mouth Daily (before breakfast). Indications: h/o bleeding ulcer with nsaid      DULoxetine (CYMBALTA) 60 mg capsule Take 120 mg by mouth daily. Indications: Anxiousness associated with Depression      multivitamins-minerals-lutein (CENTRUM SILVER) tab tablet Take 1 Tab by mouth daily. STOP taking these medications       traMADoL (ULTRAM) 50 mg tablet Comments:   Reason for Stopping:         bumetanide (BUMEX) 1 mg tablet Comments:   Reason for Stopping:               The patient's chart, MAR and AVS were reviewed by Bryce Heath.

## 2021-11-03 NOTE — ROUTINE PROCESS
Bedside and Verbal shift change report given to Via Clicktree (oncoming nurse) by Alessandra Alston (offgoing nurse). Report included the following information SBAR, Kardex, ED Summary, MAR, Cardiac Rhythm NSR/SB, Alarm Parameters  and Dual Neuro Assessment.

## 2021-11-03 NOTE — DISCHARGE INSTR - DIET
Discharge SNF/Rehab Instructions/LTAC  
 
 
PATIENT ID: Connor Tirado MRN: [id number] YOB: 1940 DATE OF ADMISSION: 10/29/2021  6:19 PM   
DATE OF DISCHARGE: 11/3/2021 PRIMARY CARE PROVIDER: Shane Mock DO  
 
 
ATTENDING PHYSICIAN: Grayson Elkins MD 
DISCHARGING PROVIDER: Luis Alberto Michel MD    
To contact this individual call [phone] and ask the  to page. If unavailable ask to be transferred the Adult Hospitalist Department. CONSULTATIONS: IP CONSULT TO NEPHROLOGY PROCEDURES/SURGERIES: * No surgery found * [address] COURSE:  
[de-identified] y.o lady who was admitted with generalized weakness and inability to care for herself. She was found to have a UTI and MORENO on admission with concerns for sepsis. Sepsis Due to UTI Treated w/ IV ceftriaxone; urine culture w/ proteus MORENO: felt to be pre-renal and due to diuretics 
-diuretics stopped 
-resolved w/ IV fluids Afib w/ rvr: 
-rate controlled and at times bradycardic. On low-dose carvedilol and Eliquis. Lower-extremity wounds: 
-wound care Hypertension: 
 
Parkinson's disease History of stroke History of PUD 
 
CAD Dyslipidemia DISCHARGE DIAGNOSES / PLAN:   
 
PENDING TEST RESULTS:  
At the time of discharge the following test results are still pending: n/a FOLLOW UP APPOINTMENTS:   
Follow-up Information Follow up With Specialties Details Why Contact Marguerite Mock DO Family Medicine In 1 week for hospital discharge follow-up [address] [address] [phone] ADDITIONAL CARE RECOMMENDATIONS: see plan of care above. Antihypertensives may need to be adjusted depending on the patient's BP and heart rate. The need for diuretics should also be re-assessed. DIET: Regular Diet ACTIVITY: PT/OT Eval and Treat WOUND CARE:  
Cleanses bilateral lower leg wounds with saline, apply xeroform, cover with abd pads and stretch conforming gauze roll daily Aquaphor moisturizer to intact skin to bilateral lower legs and feet daily PI Prevention: 
Turn/reposition approximately every 2 hours Offload heels with pillows  at all times in bed. Sacral Foam dressing: lift to assess regularly; change as needed. Discontinue if incontinent. Z-guard cream  to buttocks and sacrum TID  and as needed with incontinence care Pad bony prominences Keep HOB 30 degrees or less to decrease shearing and pressure unless medically contraindicated. If HOB is to be over 30 degrees, raise knees first then [facility] to prevent sliding Minimize layers of linen/pads under patient to optimize support surface to one  sheet and one incontinence pad DISCHARGE MEDICATIONS: 
 See Medication Reconciliation Form NOTIFY YOUR PHYSICIAN FOR ANY OF THE FOLLOWING:  
Fever over 101 degrees for 24 hours. Chest pain, shortness of breath, fever, chills, nausea, vomiting, diarrhea, change in mentation, falling, weakness, bleeding. Severe pain or pain not relieved by medications. Or, any other signs or symptoms that you may have questions about. DISPOSITION: 
  Home With: 
 OT  PT  HH  RN  
  
x SNF/Inpatient Rehab/LTAC Independent/assisted living Hospice Other:  
 
 
PATIENT CONDITION AT DISCHARGE:  
 
Functional status  
x Poor Deconditioned Independent Cognition Kandace Ernandez Forgetful Dementia Catheters/lines (plus indication) Sue PICC   
 PEG None Code status Full code DNR   
 
PHYSICAL EXAMINATION AT DISCHARGE: 
 Refer to Progress Note CHRONIC MEDICAL DIAGNOSES: 
Problem List as of 11/3/2021 Date Reviewed: 8/26/2021 Codes Class Noted - Resolved Cellulitis and abscess of lower extremity ICD-10-CM: L03.119, L02.419 ICD-9-CM: 682.6  10/29/2021 - Present SOB (shortness of breath) ICD-10-CM: R06.02 
ICD-9-CM: 786.05  4/23/2021 - Present  UTI (urinary tract infection) ICD-10-CM: N39.0 ICD-9-CM: 599.0  4/9/2021 - Present CHF exacerbation (HCC) ICD-10-CM: I50.9 ICD-9-CM: 428.0  9/25/2020 - Present Weakness ICD-10-CM: R53.1 ICD-9-CM: 780.79  5/4/2020 - Present Generalized weakness ICD-10-CM: R53.1 ICD-9-CM: 780.79  4/30/2020 - Present Carotid artery stenosis, symptomatic, left ICD-10-CM: B60.32 
ICD-9-CM: 433.10  7/26/2019 - Present HTN (hypertension) ICD-10-CM: I10 
ICD-9-CM: 401.9  7/17/2019 - Present Acute ischemic stroke [facility]) ICD-10-CM: I63.9 ICD-9-CM: 434.91  7/12/2019 - Present Fall ICD-10-CM: W19. [place]  ICD-9-CM: E888.9  12/24/2018 - Present CKD (chronic kidney disease), stage III (HCC) ICD-10-CM: N18.30 ICD-9-CM: 585.3  7/8/2015 - Present Edema ICD-10-CM: R60.9 ICD-9-CM: 782.3  5/6/2015 - Present Diabetes mellitus ([facility] ICD-10-CM: E11.9 ICD-9-CM: 250.00  5/6/2015 - Present Postoperative anemia due to acute blood loss ICD-10-CM: D62 
ICD-9-CM: 285.1  2/14/2015 - Present S/P CABG (coronary artery bypass graft) ICD-10-CM: Z95.1 ICD-9-CM: V45.81  2/13/2015 - Present Syncope ICD-10-CM: R55 
ICD-9-CM: 780.2  2/9/2015 - Present Bradycardia ICD-10-CM: R00.1 ICD-9-CM: 427.89  2/9/2015 - Present Acute kidney injury ([facility] ICD-10-CM: N17.9 ICD-9-CM: 584.9  2/9/2015 - Present Anemia ICD-10-CM: D64.9 ICD-9-CM: 285.9  9/9/2014 - Present GI bleed ICD-10-CM: K92.2 ICD-9-CM: 578.9  9/9/2014 - Present AF (atrial fibrillation) ([facility] ICD-10-CM: I48.91 
ICD-9-CM: 427.31  6/20/2014 - Present Hypotension ICD-10-CM: I95.9 ICD-9-CM: 458.9  6/3/2014 - Present Coronary artery disease ICD-10-CM: I25.10 ICD-9-CM: 414.00  6/3/2014 - Present Overview Signed 2/10/2015 11:06 AM by Carlo Gotti  
  2007 PCI x2 to LAD with STEPHAN 
 
  
  
   
 S/P coronary artery stent placement ICD-10-CM: Z95.5 ICD-9-CM: V45.82  6/3/2014 - Present Renal failure ICD-10-CM: N19 
ICD-9-CM: 545  6/3/2014 - Present Elevated troponin ICD-10-CM: R77.8 ICD-9-CM: 790.6  6/3/2014 - Present Pericardial effusion ICD-10-CM: I31.3 ICD-9-CM: 423.9  6/3/2014 - Present Lactic acidosis ICD-10-CM: E87.2 ICD-9-CM: 276.2  6/3/2014 - Present RESOLVED: CHF (congestive heart failure) (HCC) ICD-10-CM: I50.9 ICD-9-CM: 428.0  9/22/2020 - 8/3/2021 CDMP Checked:  
Yes x PROBLEM LIST Updated: 
Yes x Signed:  
Rio Mejias MD 
11/3/2021 
10:42 AM

## 2021-11-03 NOTE — PROGRESS NOTES
Transition of Care Plan   RUR- High 20%   DISPOSITION: SNF - [facility]   F/U with PCP/Specialist     Transport: [facility] scheduled for 3pm    Patient has received insurance auth from [facility] starting today through 11/5. Khloe Escobaravril ANDRADE#[id number]. CM: Ion Iyer. Message left for Pat at [facility] regarding bed availability today. [facility] scheduled for 3pm, this may be adjusted pending medical stability and bed. 11:09am: [facility] does not have a bed available for patient today, unsure when bed will become available. Patient is amenable to [facility] for SNF. Referral sent in [address]. [facility] will review patient and return call. CM will switch auth for patient with [facility] if accepted. 11:27am: [facility] able to [facility] today. [facility] destination updated. Rose has authorized [facility] rather than [facility] and will reach out to [facility] to provide [employer]. Call report#[phone] [place] wing; room #119B. Medicare pt has received, reviewed, and signed 2nd IM letter informing them of their right to appeal the discharge. Signed copy has been placed on pt bedside chart. 11:52am: CM spoke with patient's friend, Annetta Mark, who is in agreement with discharge plan. CM answered questions regarding [facility] [employer] referral, patient's friend assisting with Medicaid application. ANGELICA [email or web address].S.[email or web address].

## 2021-11-10 NOTE — PROGRESS NOTES
Physician Progress Note      PATIENT:               Yelena Paris  CSN #:                  [id number]  :                       1940  ADMIT DATE:       10/29/2021 6:19 PM  Kyra Leiva DATE:        11/3/2021 3:26 PM  RESPONDING  PROVIDER #:        Car Parker MD          QUERY TEXT:    Pt admitted with sepsis and has CHF documented. If possible, please document in progress notes and discharge summary further specificity regarding the type and acuity of CHF:    The medical record reflects the following:  Risk Factors: DM, HTN, hx CHF    Clinical Indicators:    Echo 21-  Result status: Final result  ? LV: Estimated LVEF is 50 - 55%. Visually measured ejection fraction. Normal cavity size. Mild concentric hypertrophy. Low normal systolic function. Abnormal left ventricular septal motion consistent with left bundle branch block. Moderate (grade 2) left ventricular diastolic dysfunction. ? MV: Mitral valve leaflet calcification. Moderate mitral annular calcification. Mild to moderate mitral valve regurgitation is present. ? LA: Moderately dilated left atrium. Left Atrium volume index is 49 mL/m2.  ? TV: Mild to moderate tricuspid valve regurgitation is present. Right Ventricular Arterial Pressure (RVSP) is 45 mmHg. Pulmonary hypertension found to be mild. ? PA: Mild pulmonary hypertension. Pulmonary arterial systolic pressure is 45 mmHg. ? AV: Mild to moderate aortic valve regurgitation is present. ? RV: Not well visualized. Borderline low systolic function. ? Image quality for this study was technically difficult. Treatment: Coreg 3.125mg PO BID; Bumex 1mg PO daily PTA;     Thank you,  Swapna Hanley RN, BSN, CCDS, [place], [place]  Clinical Documentation  [phone] or [phone]  Options provided:  -- Acute on Chronic Diastolic CHF/HFpEF  -- Acute on Chronic Systolic and Diastolic CHF  -- Other - I will add my own diagnosis  -- Disagree - Not applicable / Not valid  -- Disagree - Clinically unable to determine / Unknown  -- Refer to Clinical Documentation Reviewer    PROVIDER RESPONSE TEXT:    Provider disagreed with this query. chronic systolic hf    Query created by: John Pruett on 11/2/2021 8:29 AM      QUERY TEXT:    Pt admitted with sepsis and noted to have BLE cellulitis. Pt noted to have DM with a1c of 6.3 on 10/30. If possible, please document in progress notes and discharge summary the relationship, if any, between cellulitis and DM. The medical record reflects the following:  Risk Factors: 80yo; DM    Clinical Indicators:    H&P-  Fevers and leukocytosis, most likely secondary to combination of bilateral lower extremity cellulitis. The patient will be started on broad-spectrum antibiotics Zosyn and vancomycin. Monitor the condition closely. We will get Wound consult. Jerad MIGUEL 11/1-  Bilateral lower extremities with dry flaky skin, woody fibrosis, and hemosiderin staining. Legs warm to touch. Distal lower legs with erythema, chronic, (stasis dermatitis?). No edema at this time. Legs elevated on pillows    POA left distal lateral lower leg wound  Etiology: venous insufficiency? Partial thickness  8 x 6  cm  Base is pink  scant amount serosang exudate    POA left distal medial lower leg wound  Etiology: venous insufficiency? Partial thickness  8 x 3  cm  Base is pink  scant amount serosang exudate    POA right distal lateral lower leg wound  Etiology: venous insufficiency? Partial thickness  1.5 x 0.8  cm  Base is pink  scant amount serosang exudate    POA right distal medial lower leg wound  Etiology: venous insufficiency?   Partial thickness  8 x 6  cm  Base is pink  scant amount serosang exudate    Treatment: Wound care consult; Rocephin 1g IV daily; Zosyn 3.375g IV Q 8hrs; Vanc IV daily    Thank you,  Leda Godinez RN, BSN, CCDS, [place], [place]  Clinical Documentation  [phone] or [phone]  Options provided:  -- BLE cellulitis associated with Diabetes  -- BLE cellulitis unrelated to Diabetes  -- BLE cellulitis ruled out  -- Other - I will add my own diagnosis  -- Disagree - Not applicable / Not valid  -- Disagree - Clinically unable to determine / Unknown  -- Refer to Clinical Documentation Reviewer    PROVIDER RESPONSE TEXT:    Not cellulitis. Chronic venous stasis dermatitis.     Query created by: Ruth Mcgee on 11/3/2021 12:08 PM      Electronically signed by:  Danielle Gu MD 11/9/2021 8:50 PM

## 2021-12-10 ENCOUNTER — HOSPITAL ENCOUNTER (INPATIENT)
Age: 81
LOS: 11 days | Discharge: NURSING, REHAB OR LONG-TERM CARE | DRG: 193 | End: 2021-12-21
Attending: EMERGENCY MEDICINE | Admitting: FAMILY MEDICINE
Payer: MEDICARE

## 2021-12-10 ENCOUNTER — APPOINTMENT (OUTPATIENT)
Dept: GENERAL RADIOLOGY | Age: 81
DRG: 193 | End: 2021-12-10
Attending: EMERGENCY MEDICINE
Payer: MEDICARE

## 2021-12-10 DIAGNOSIS — J81.0 ACUTE PULMONARY EDEMA (HCC): ICD-10-CM

## 2021-12-10 DIAGNOSIS — J18.9 COMMUNITY ACQUIRED PNEUMONIA OF LEFT LOWER LOBE OF LUNG: ICD-10-CM

## 2021-12-10 DIAGNOSIS — I16.0 HYPERTENSIVE URGENCY: ICD-10-CM

## 2021-12-10 DIAGNOSIS — Z20.822 SUSPECTED COVID-19 VIRUS INFECTION: ICD-10-CM

## 2021-12-10 DIAGNOSIS — J96.21 ACUTE ON CHRONIC RESPIRATORY FAILURE WITH HYPOXIA (HCC): Primary | ICD-10-CM

## 2021-12-10 LAB
ALBUMIN SERPL-MCNC: 3.3 G/DL (ref 3.5–5)
ALBUMIN/GLOB SERPL: 0.9 {RATIO} (ref 1.1–2.2)
ALP SERPL-CCNC: 94 U/L (ref 45–117)
ALT SERPL-CCNC: 10 U/L (ref 12–78)
ANION GAP SERPL CALC-SCNC: 5 MMOL/L (ref 5–15)
ARTERIAL PATENCY WRIST A: POSITIVE
AST SERPL-CCNC: 29 U/L (ref 15–37)
B PERT DNA SPEC QL NAA+PROBE: NOT DETECTED
BASE DEFICIT BLD-SCNC: 5.3 MMOL/L
BASOPHILS # BLD: 0 K/UL (ref 0–0.1)
BASOPHILS NFR BLD: 0 % (ref 0–1)
BDY SITE: ABNORMAL
BILIRUB SERPL-MCNC: 0.9 MG/DL (ref 0.2–1)
BNP SERPL-MCNC: ABNORMAL PG/ML
BORDETELLA PARAPERTUSSIS PCR, BORPAR: NOT DETECTED
BUN SERPL-MCNC: 27 MG/DL (ref 6–20)
BUN/CREAT SERPL: 14 (ref 12–20)
C PNEUM DNA SPEC QL NAA+PROBE: NOT DETECTED
CA-I BLD-SCNC: 1.26 MMOL/L (ref 1.12–1.32)
CALCIUM SERPL-MCNC: 9.1 MG/DL (ref 8.5–10.1)
CHLORIDE SERPL-SCNC: 111 MMOL/L (ref 97–108)
CK SERPL-CCNC: 111 U/L (ref 26–192)
CO2 SERPL-SCNC: 20 MMOL/L (ref 21–32)
COMMENT, HOLDF: NORMAL
COVID-19 RAPID TEST, COVR: NOT DETECTED
CREAT SERPL-MCNC: 1.94 MG/DL (ref 0.55–1.02)
CRP SERPL-MCNC: 0.82 MG/DL (ref 0–0.6)
DIFFERENTIAL METHOD BLD: ABNORMAL
EOSINOPHIL # BLD: 0.2 K/UL (ref 0–0.4)
EOSINOPHIL NFR BLD: 1 % (ref 0–7)
ERYTHROCYTE [DISTWIDTH] IN BLOOD BY AUTOMATED COUNT: 15.4 % (ref 11.5–14.5)
FLUAV H1 2009 PAND RNA SPEC QL NAA+PROBE: NOT DETECTED
FLUAV H1 RNA SPEC QL NAA+PROBE: NOT DETECTED
FLUAV H3 RNA SPEC QL NAA+PROBE: NOT DETECTED
FLUAV SUBTYP SPEC NAA+PROBE: NOT DETECTED
FLUBV RNA SPEC QL NAA+PROBE: NOT DETECTED
GAS FLOW.O2 O2 DELIVERY SYS: ABNORMAL L/MIN
GLOBULIN SER CALC-MCNC: 3.6 G/DL (ref 2–4)
GLUCOSE SERPL-MCNC: 174 MG/DL (ref 65–100)
HADV DNA SPEC QL NAA+PROBE: NOT DETECTED
HCO3 BLD-SCNC: 19.3 MMOL/L (ref 22–26)
HCOV 229E RNA SPEC QL NAA+PROBE: NOT DETECTED
HCOV HKU1 RNA SPEC QL NAA+PROBE: NOT DETECTED
HCOV NL63 RNA SPEC QL NAA+PROBE: NOT DETECTED
HCOV OC43 RNA SPEC QL NAA+PROBE: NOT DETECTED
HCT VFR BLD AUTO: 29.1 % (ref 35–47)
HGB BLD-MCNC: 9.3 G/DL (ref 11.5–16)
HMPV RNA SPEC QL NAA+PROBE: NOT DETECTED
HPIV1 RNA SPEC QL NAA+PROBE: NOT DETECTED
HPIV2 RNA SPEC QL NAA+PROBE: NOT DETECTED
HPIV3 RNA SPEC QL NAA+PROBE: NOT DETECTED
HPIV4 RNA SPEC QL NAA+PROBE: NOT DETECTED
IMM GRANULOCYTES # BLD AUTO: 0.2 K/UL (ref 0–0.04)
IMM GRANULOCYTES NFR BLD AUTO: 1 % (ref 0–0.5)
LACTATE BLD-SCNC: 1.24 MMOL/L (ref 0.4–2)
LYMPHOCYTES # BLD: 0.5 K/UL (ref 0.8–3.5)
LYMPHOCYTES NFR BLD: 3 % (ref 12–49)
M PNEUMO DNA SPEC QL NAA+PROBE: NOT DETECTED
MAGNESIUM SERPL-MCNC: 1.9 MG/DL (ref 1.6–2.4)
MCH RBC QN AUTO: 32.2 PG (ref 26–34)
MCHC RBC AUTO-ENTMCNC: 32 G/DL (ref 30–36.5)
MCV RBC AUTO: 100.7 FL (ref 80–99)
MONOCYTES # BLD: 0.8 K/UL (ref 0–1)
MONOCYTES NFR BLD: 5 % (ref 5–13)
NEUTS SEG # BLD: 14 K/UL (ref 1.8–8)
NEUTS SEG NFR BLD: 90 % (ref 32–75)
NRBC # BLD: 0 K/UL (ref 0–0.01)
NRBC BLD-RTO: 0 PER 100 WBC
PCO2 BLD: 32.9 MMHG (ref 35–45)
PEEP RESPIRATORY: 5 CMH2O
PH BLD: 7.38 [PH] (ref 7.35–7.45)
PHOSPHATE SERPL-MCNC: 4.2 MG/DL (ref 2.6–4.7)
PIP ISTAT,IPIP: 15
PLATELET # BLD AUTO: 296 K/UL (ref 150–400)
PMV BLD AUTO: 9.2 FL (ref 8.9–12.9)
PO2 BLD: 318 MMHG (ref 80–100)
POTASSIUM SERPL-SCNC: 4.9 MMOL/L (ref 3.5–5.1)
PROCALCITONIN SERPL-MCNC: 0.14 NG/ML
PROT SERPL-MCNC: 6.9 G/DL (ref 6.4–8.2)
RBC # BLD AUTO: 2.89 M/UL (ref 3.8–5.2)
RBC MORPH BLD: ABNORMAL
RSV RNA SPEC QL NAA+PROBE: NOT DETECTED
RV+EV RNA SPEC QL NAA+PROBE: NOT DETECTED
SAMPLES BEING HELD,HOLD: NORMAL
SAO2 % BLD: 99.9 % (ref 92–97)
SARS-COV-2 PCR, COVPCR: NOT DETECTED
SODIUM SERPL-SCNC: 136 MMOL/L (ref 136–145)
SOURCE, COVRS: NORMAL
SPECIMEN TYPE: ABNORMAL
TROPONIN-HIGH SENSITIVITY: 1888 NG/L (ref 0–51)
TROPONIN-HIGH SENSITIVITY: 2017 NG/L (ref 0–51)
WBC # BLD AUTO: 15.7 K/UL (ref 3.6–11)

## 2021-12-10 PROCEDURE — 83605 ASSAY OF LACTIC ACID: CPT

## 2021-12-10 PROCEDURE — 74011250636 HC RX REV CODE- 250/636: Performed by: EMERGENCY MEDICINE

## 2021-12-10 PROCEDURE — 5A09357 ASSISTANCE WITH RESPIRATORY VENTILATION, LESS THAN 24 CONSECUTIVE HOURS, CONTINUOUS POSITIVE AIRWAY PRESSURE: ICD-10-PCS | Performed by: FAMILY MEDICINE

## 2021-12-10 PROCEDURE — 83735 ASSAY OF MAGNESIUM: CPT

## 2021-12-10 PROCEDURE — 99285 EMERGENCY DEPT VISIT HI MDM: CPT

## 2021-12-10 PROCEDURE — 83880 ASSAY OF NATRIURETIC PEPTIDE: CPT

## 2021-12-10 PROCEDURE — 80053 COMPREHEN METABOLIC PANEL: CPT

## 2021-12-10 PROCEDURE — 82550 ASSAY OF CK (CPK): CPT

## 2021-12-10 PROCEDURE — 65660000000 HC RM CCU STEPDOWN

## 2021-12-10 PROCEDURE — 71045 X-RAY EXAM CHEST 1 VIEW: CPT

## 2021-12-10 PROCEDURE — 87899 AGENT NOS ASSAY W/OPTIC: CPT

## 2021-12-10 PROCEDURE — 93005 ELECTROCARDIOGRAM TRACING: CPT

## 2021-12-10 PROCEDURE — 87040 BLOOD CULTURE FOR BACTERIA: CPT

## 2021-12-10 PROCEDURE — 84145 PROCALCITONIN (PCT): CPT

## 2021-12-10 PROCEDURE — 0202U NFCT DS 22 TRGT SARS-COV-2: CPT

## 2021-12-10 PROCEDURE — 74011250637 HC RX REV CODE- 250/637: Performed by: FAMILY MEDICINE

## 2021-12-10 PROCEDURE — 36600 WITHDRAWAL OF ARTERIAL BLOOD: CPT

## 2021-12-10 PROCEDURE — 74011250637 HC RX REV CODE- 250/637: Performed by: EMERGENCY MEDICINE

## 2021-12-10 PROCEDURE — 84100 ASSAY OF PHOSPHORUS: CPT

## 2021-12-10 PROCEDURE — 96375 TX/PRO/DX INJ NEW DRUG ADDON: CPT

## 2021-12-10 PROCEDURE — 87635 SARS-COV-2 COVID-19 AMP PRB: CPT

## 2021-12-10 PROCEDURE — 85025 COMPLETE CBC W/AUTO DIFF WBC: CPT

## 2021-12-10 PROCEDURE — 86140 C-REACTIVE PROTEIN: CPT

## 2021-12-10 PROCEDURE — 96374 THER/PROPH/DIAG INJ IV PUSH: CPT

## 2021-12-10 PROCEDURE — 94660 CPAP INITIATION&MGMT: CPT

## 2021-12-10 PROCEDURE — 84484 ASSAY OF TROPONIN QUANT: CPT

## 2021-12-10 PROCEDURE — 82803 BLOOD GASES ANY COMBINATION: CPT

## 2021-12-10 PROCEDURE — 87449 NOS EACH ORGANISM AG IA: CPT

## 2021-12-10 PROCEDURE — 74011000250 HC RX REV CODE- 250: Performed by: EMERGENCY MEDICINE

## 2021-12-10 PROCEDURE — 36415 COLL VENOUS BLD VENIPUNCTURE: CPT

## 2021-12-10 RX ORDER — ACETAMINOPHEN 650 MG/1
650 SUPPOSITORY RECTAL
Status: DISCONTINUED | OUTPATIENT
Start: 2021-12-10 | End: 2021-12-21 | Stop reason: HOSPADM

## 2021-12-10 RX ORDER — ACETAMINOPHEN 325 MG/1
650 TABLET ORAL
Status: DISCONTINUED | OUTPATIENT
Start: 2021-12-10 | End: 2021-12-21 | Stop reason: HOSPADM

## 2021-12-10 RX ORDER — CARBIDOPA AND LEVODOPA 25; 100 MG/1; MG/1
2 TABLET ORAL 4 TIMES DAILY
Status: DISCONTINUED | OUTPATIENT
Start: 2021-12-10 | End: 2021-12-21 | Stop reason: HOSPADM

## 2021-12-10 RX ORDER — DULOXETIN HYDROCHLORIDE 60 MG/1
120 CAPSULE, DELAYED RELEASE ORAL DAILY
Status: DISCONTINUED | OUTPATIENT
Start: 2021-12-10 | End: 2021-12-21 | Stop reason: HOSPADM

## 2021-12-10 RX ORDER — ATORVASTATIN CALCIUM 40 MG/1
40 TABLET, FILM COATED ORAL
Status: DISCONTINUED | OUTPATIENT
Start: 2021-12-10 | End: 2021-12-21 | Stop reason: HOSPADM

## 2021-12-10 RX ORDER — POLYETHYLENE GLYCOL 3350 17 G/17G
17 POWDER, FOR SOLUTION ORAL DAILY PRN
Status: DISCONTINUED | OUTPATIENT
Start: 2021-12-10 | End: 2021-12-21 | Stop reason: HOSPADM

## 2021-12-10 RX ORDER — CARVEDILOL 3.12 MG/1
3.12 TABLET ORAL 2 TIMES DAILY WITH MEALS
Status: DISCONTINUED | OUTPATIENT
Start: 2021-12-10 | End: 2021-12-21 | Stop reason: HOSPADM

## 2021-12-10 RX ORDER — PANTOPRAZOLE SODIUM 40 MG/1
40 TABLET, DELAYED RELEASE ORAL
Status: DISCONTINUED | OUTPATIENT
Start: 2021-12-10 | End: 2021-12-21 | Stop reason: HOSPADM

## 2021-12-10 RX ORDER — HYDRALAZINE HYDROCHLORIDE 20 MG/ML
20 INJECTION INTRAMUSCULAR; INTRAVENOUS ONCE
Status: COMPLETED | OUTPATIENT
Start: 2021-12-10 | End: 2021-12-10

## 2021-12-10 RX ORDER — SODIUM CHLORIDE 0.9 % (FLUSH) 0.9 %
5-40 SYRINGE (ML) INJECTION EVERY 8 HOURS
Status: DISCONTINUED | OUTPATIENT
Start: 2021-12-10 | End: 2021-12-21 | Stop reason: HOSPADM

## 2021-12-10 RX ORDER — GABAPENTIN 100 MG/1
100 CAPSULE ORAL
Status: DISCONTINUED | OUTPATIENT
Start: 2021-12-10 | End: 2021-12-21 | Stop reason: HOSPADM

## 2021-12-10 RX ORDER — ONDANSETRON 2 MG/ML
4 INJECTION INTRAMUSCULAR; INTRAVENOUS
Status: DISCONTINUED | OUTPATIENT
Start: 2021-12-10 | End: 2021-12-21 | Stop reason: HOSPADM

## 2021-12-10 RX ORDER — ONDANSETRON 4 MG/1
4 TABLET, ORALLY DISINTEGRATING ORAL
Status: DISCONTINUED | OUTPATIENT
Start: 2021-12-10 | End: 2021-12-21 | Stop reason: HOSPADM

## 2021-12-10 RX ORDER — AMLODIPINE BESYLATE 5 MG/1
2.5 TABLET ORAL DAILY
Status: DISCONTINUED | OUTPATIENT
Start: 2021-12-10 | End: 2021-12-21 | Stop reason: HOSPADM

## 2021-12-10 RX ORDER — FUROSEMIDE 10 MG/ML
80 INJECTION INTRAMUSCULAR; INTRAVENOUS
Status: COMPLETED | OUTPATIENT
Start: 2021-12-10 | End: 2021-12-10

## 2021-12-10 RX ORDER — LANOLIN ALCOHOL/MO/W.PET/CERES
6 CREAM (GRAM) TOPICAL
Status: DISCONTINUED | OUTPATIENT
Start: 2021-12-10 | End: 2021-12-21 | Stop reason: HOSPADM

## 2021-12-10 RX ORDER — BUSPIRONE HYDROCHLORIDE 5 MG/1
5 TABLET ORAL 3 TIMES DAILY
Status: DISCONTINUED | OUTPATIENT
Start: 2021-12-10 | End: 2021-12-21 | Stop reason: HOSPADM

## 2021-12-10 RX ORDER — SODIUM CHLORIDE 0.9 % (FLUSH) 0.9 %
5-40 SYRINGE (ML) INJECTION AS NEEDED
Status: DISCONTINUED | OUTPATIENT
Start: 2021-12-10 | End: 2021-12-21 | Stop reason: HOSPADM

## 2021-12-10 RX ADMIN — CEFTRIAXONE SODIUM 1 G: 1 INJECTION, POWDER, FOR SOLUTION INTRAMUSCULAR; INTRAVENOUS at 05:09

## 2021-12-10 RX ADMIN — AMLODIPINE BESYLATE 2.5 MG: 5 TABLET ORAL at 09:16

## 2021-12-10 RX ADMIN — CARBIDOPA AND LEVODOPA 2 TABLET: 25; 100 TABLET ORAL at 19:27

## 2021-12-10 RX ADMIN — FUROSEMIDE 80 MG: 10 INJECTION, SOLUTION INTRAVENOUS at 05:10

## 2021-12-10 RX ADMIN — CARBIDOPA AND LEVODOPA 2 TABLET: 25; 100 TABLET ORAL at 13:00

## 2021-12-10 RX ADMIN — ATORVASTATIN CALCIUM 40 MG: 40 TABLET, FILM COATED ORAL at 23:46

## 2021-12-10 RX ADMIN — GABAPENTIN 100 MG: 100 CAPSULE ORAL at 23:46

## 2021-12-10 RX ADMIN — BUSPIRONE HYDROCHLORIDE 5 MG: 5 TABLET ORAL at 23:46

## 2021-12-10 RX ADMIN — CARBIDOPA AND LEVODOPA 2 TABLET: 25; 100 TABLET ORAL at 23:46

## 2021-12-10 RX ADMIN — NITROGLYCERIN 1 INCH: 20 OINTMENT TOPICAL at 05:10

## 2021-12-10 RX ADMIN — AZITHROMYCIN MONOHYDRATE 500 MG: 500 INJECTION, POWDER, LYOPHILIZED, FOR SOLUTION INTRAVENOUS at 05:10

## 2021-12-10 RX ADMIN — Medication 6 MG: at 23:46

## 2021-12-10 RX ADMIN — BUSPIRONE HYDROCHLORIDE 5 MG: 5 TABLET ORAL at 16:00

## 2021-12-10 RX ADMIN — APIXABAN 2.5 MG: 2.5 TABLET, FILM COATED ORAL at 09:17

## 2021-12-10 RX ADMIN — APIXABAN 2.5 MG: 2.5 TABLET, FILM COATED ORAL at 19:27

## 2021-12-10 RX ADMIN — HYDRALAZINE HYDROCHLORIDE 20 MG: 20 INJECTION INTRAMUSCULAR; INTRAVENOUS at 04:41

## 2021-12-10 RX ADMIN — CARVEDILOL 3.12 MG: 3.12 TABLET, FILM COATED ORAL at 09:17

## 2021-12-10 RX ADMIN — BUSPIRONE HYDROCHLORIDE 5 MG: 5 TABLET ORAL at 09:18

## 2021-12-10 RX ADMIN — CARBIDOPA AND LEVODOPA 2 TABLET: 25; 100 TABLET ORAL at 09:18

## 2021-12-10 RX ADMIN — DULOXETINE 120 MG: 60 CAPSULE, DELAYED RELEASE ORAL at 09:18

## 2021-12-10 RX ADMIN — CARVEDILOL 3.12 MG: 3.12 TABLET, FILM COATED ORAL at 17:00

## 2021-12-10 RX ADMIN — PANTOPRAZOLE SODIUM 40 MG: 40 TABLET, DELAYED RELEASE ORAL at 09:17

## 2021-12-10 NOTE — ED NOTES
Pt hasn't voided since being given Lasix. Nurse Segundo bladder scanned patient showing 369 mL of urine in bladder. Doctor Elma Acosta was notified. Nurse was instructed by MD to repeat bladder scan in 2 hours and let provider know.

## 2021-12-10 NOTE — ED TRIAGE NOTES
[de-identified]year old female pt comes to the ER via squad from [facility] for a CC Respiratory Distress. Pt states that she has been feeling having SOB all day. Staff found that she was sating at 47 percent on O2 at 2 lpm. Pt is normally on 2 lpm. Pt has a history of heart failure. Pt is A&Ox4 currently and has no pain. Pt is also currently on BIPAP.

## 2021-12-10 NOTE — ED PROVIDER NOTES
The patient is an 46-year-old female with a past medical history significant anxiety, atrial fibrillation, coronary disease, CABG, cervical stenosis, chronic kidney disease, CVA, depression, anxiety, diabetes, GERD, hypertension, chronic circulatory failure and oxygen dependent at 2 L nasal cannula, dementia, DNR who presents to the ER from [facility] for evaluation for respiratory distress. According to EMS, the patient was found to have an oxygen saturation of 55% on 2 L nasal cannula. The patient was placed on CPAP and transported to this ER for further evaluation. She is a very limited historian. Most of the history was obtained from the nursing home staff and EMS crew. She denies any nausea or vomiting, headache, chest pain, abdominal pain, diarrhea constipation, dysuria, sick contact, skin rash or recent travel.            Past Medical History:   Diagnosis Date    Anxiety disorder     Atrial fibrillation (HCC)     CAD (coronary artery disease) 2007    stents, CABG x 3v    Carotid stenosis     Cervical stenosis of spinal canal 07/2019    Chronic kidney disease     Cough     CVA (cerebral vascular accident) ([facility] [address] 07/2019    left lacunar infarct at head of caudate    Depression     AND CHRONIC ANXIETY    Diabetes (HCC)     GERD (gastroesophageal reflux disease)     High cholesterol     History of peptic ulcer     Bleeding ulcer with increased NSAID use    Hypertension     Left carotid stenosis 07/2019    s/p left CEA with Dr. Nirmal Joseph, old 2007    PUD (peptic ulcer disease)     Stroke ([facility] [address]     Tremor     Valvular heart disease        Past Surgical History:   Procedure Laterality Date    HX CAROTID ENDARTERECTOMY  07/2019    HX CORONARY ARTERY BYPASS GRAFT      HX TONSILLECTOMY  1963    DC CARDIAC SURG PROCEDURE UNLIST      CABG X3 VESSEL    DC TOTAL KNEE ARTHROPLASTY Right 2015         Family History:   Problem Relation Age of Onset    Heart Attack Mother 72 Dec 89yo    Hypertension Mother     Other Father         Unknown    Parkinson's Disease Brother     Anesth Problems Neg Hx        Social History     Socioeconomic History    Marital status: SINGLE     Spouse name: Not on file    Number of children: Not on file    Years of education: Not on file    Highest education level: Not on file   Occupational History    Occupation: Retired realestate/teacher   Tobacco Use    Smoking status: Former Smoker     Packs/day: 0.25     Years: 5.00     Pack years: 1.25     Types: Cigarettes     Quit date:      Years since quittin.9    Smokeless tobacco: Never Used   Substance and Sexual Activity    Alcohol use: Not Currently     Comment: Rare    Drug use: No    Sexual activity: Not on file   Other Topics Concern    Not on file   Social History Narrative    L[facility]     Social Determinants of Health     Financial Resource Strain:     Difficulty of Paying Living Expenses: Not on file   Food Insecurity:     Worried About [address] in the Last Year: Not on file    [address] in the Last Year: Not on file   Transportation Needs:     Lack of Transportation (Medical): Not on file    Lack of Transportation (Non-Medical):  Not on file   Physical Activity:     Days of Exercise per Week: Not on file    Minutes of Exercise per Session: Not on file   Stress:     Feeling of Stress : Not on file   Social Connections:     Frequency of Communication with Friends and Family: Not on file    Frequency of Social Gatherings with Friends and Family: Not on file    Attends Church Services: Not on file    Active Member of Clubs or Organizations: Not on file    Attends Club or Organization Meetings: Not on file    Marital Status: Not on file   Intimate Partner Violence:     Fear of Current or Ex-Partner: Not on file    Emotionally Abused: Not on file    Physically Abused: Not on file    Sexually Abused: Not on file   Housing Stability:  Unable to Pay for Housing in the Last Year: Not on file    Number of Places Lived in the Last Year: Not on file    Unstable Housing in the Last Year: Not on file         ALLERGIES: Patient has no known allergies. Review of Systems   All other systems reviewed and are negative. Vitals:    12/10/21 0343 12/10/21 0345 12/10/21 0348   BP: (!) 194/77 (!) 180/76    Pulse: 73 77    Resp: 21 22    Temp: 98.3 °F (36.8 °C)     SpO2: 100% 100% 100%   Weight: 78.9 kg (174 lb)              Physical Exam  Vitals and nursing note reviewed. Exam conducted with a chaperone present. CONSTITUTIONAL: Well-appearing; well-nourished; in no apparent distress  HEAD: Normocephalic; atraumatic  EYES: PERRL; EOM intact; conjunctiva and sclera are clear bilaterally. ENT: No rhinorrhea; normal pharynx with no tonsillar hypertrophy; mucous membranes pink/moist, no erythema, no exudate. NECK: Supple; non-tender; no cervical lymphadenopathy  CARD: Normal S1, S2; no murmurs, rubs, or gallops. Regular rate and rhythm. RESP: Coarse breath with scattered rhonchi and moderate intercostal retraction bilaterally; no rales or wheezing. ABD: Normal bowel sounds; non-distended; non-tender; no palpable organomegaly, no masses, no bruits. Back Exam: Normal inspection; no vertebral point tenderness, no CVA tenderness. Normal range of motion. EXT: Normal ROM in all four extremities; non-tender to palpation; no swelling or deformity; distal pulses are normal, no edema. SKIN: Warm; dry; no rash. NEURO:Alert and oriented x 2, coherent, VIRGIE-XII grossly intact, sensory and motor are non-focal.        MDM  Number of Diagnoses or Management Options  Diagnosis management comments: Assessment: Acute respiratory distress on the 20-year-old female with hypoxia rule out pulmonary edema, pneumonia, ACS, VTE, COVID-19 infection.     Plan: EKG lab/ /chest x-ray/DuoNeb/respiratory support with supplemental oxygen and/or BiPAP/education, reassurance, symptomatic treatment/ Monitor and Reevaluate. Amount and/or Complexity of Data Reviewed  Clinical lab tests: ordered and reviewed  Tests in the radiology section of CPT®: ordered and reviewed  Tests in the medicine section of CPT®: reviewed and ordered  Discussion of test results with the performing providers: yes  Decide to obtain previous medical records or to obtain history from someone other than the patient: yes  Obtain history from someone other than the patient: yes  Review and summarize past medical records: yes  Discuss the patient with other providers: yes  Independent visualization of images, tracings, or specimens: yes    Risk of Complications, Morbidity, and/or Mortality  Presenting problems: moderate  Diagnostic procedures: moderate  Management options: moderate           Procedures    ED EKG interpretation:  Rhythm: normal sinus rhythm; and regular . Rate (approx.): 70; Axis: left axis deviation; P wave: normal; QRS interval: prolonged; ST/T wave: non-specific changes; in  Lead: Diffusely; LVH with repolarization abnormality; ; Other findings: abnormal ekg. This EKG was interpreted by Bishop Astrid MD,ED Provider. XRAY INTERPRETATION (ED MD)  Chest Xray  Acute pulmonary edema and left lower lobe pneumonia. Normal heart size. No bony abnormalities. Renate Tony MD 4:06 AM    PROGRESS NOTE:  Pt has been reexamined by Renate Tony MD all available results have been reviewed with pt and any available family. Pt understands sx, dx, and tx in ED. Care plan has been outlined and questions have been answered. Pt and any available family understands and agrees to need for admission to hospital for further tx not available in ED. Pt is ready for admission. Written by Bishop Astrid MD,  4:51 AM    CONSULT NOTE:  Renate Tony MD spoke with Dr. Garry Schrader of the adult hospitalist team. Discussed patient's presentation, history, physical assessment, and available diagnostic results.  He will evaluate, write orders and admit the patient to the hospital. 4:51 AM    Perfect Serve Consult for Admission  4:54 AM    ED Room Number: ER09/09  Patient Name and age:  Swati Joyner [de-identified] y.o.  female  Working Diagnosis:   1. Acute on chronic respiratory failure with hypoxia (HCC)    2. Acute pulmonary edema (HCC)    3. Community acquired pneumonia of left lower lobe of lung    4. Suspected COVID-19 virus infection    5. Hypertensive urgency        COVID-19 Suspicion:  yes  Sepsis present:  no  Reassessment needed: no  Code Status:  Do Not Resuscitate  Readmission: no  Isolation Requirements:  yes  Recommended Level of Care:  telemetry  Department:  [facility] Adult ED - 21     Other: The patient is on BiPAP    Total critical care time spent exclusive of procedures:  45 minutes. Barrett Hernandez

## 2021-12-10 NOTE — H&P
[facility] Adult  Hospitalist Group  History and Physical    Primary Care Provider: Carmelita Paris DO  Date of Service:  12/10/2021    Subjective:     Alma Pump is a [de-identified] y.o. 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 Per record, she was picked up from [facility] where she was found to be hypoxic to the fifties on her normal 2 L nasal cannula. She was recently admitted from 10/29 to 11/3 for sepsis 2/2 proteus UTI. In the ED, SBP was elevated to one nineties. Per record, SPO2 is 100% on BiPAP. Labs were significant for WBC 15.7, creatinine 1.94, CO2 20, troponin 2,017, and probnp 18,236. Rapid covid is negative. In the ED,     Review of Systems:    All other review of systems were negative except for that written in the History of Present Illness.      Past Medical History:   Diagnosis Date    Anxiety disorder     Atrial fibrillation (HCC)     CAD (coronary artery disease) 2007    stents, CABG x 3v    Carotid stenosis     Cervical stenosis of spinal canal 07/2019    Chronic kidney disease     Cough     CVA (cerebral vascular accident) ([address] 07/2019    left lacunar infarct at head of caudate    Depression     AND CHRONIC ANXIETY    Diabetes (HCC)     GERD (gastroesophageal reflux disease)     High cholesterol     History of peptic ulcer     Bleeding ulcer with increased NSAID use    Hypertension     Left carotid stenosis 07/2019    s/p left CEA with Dr. Andrew Perez, old 2007    PUD (peptic ulcer disease)     Stroke ([facility] [address]     Tremor     Valvular heart disease       Past Surgical History:   Procedure Laterality Date    HX CAROTID ENDARTERECTOMY  07/2019    HX CORONARY ARTERY BYPASS GRAFT      HX TONSILLECTOMY  1963    NM CARDIAC SURG PROCEDURE UNLIST      CABG X3 VESSEL    NM TOTAL KNEE ARTHROPLASTY Right 2015     Prior to Admission medications    Medication Sig Start Date End Date Taking? Authorizing Provider   apixaban (ELIQUIS) 2.5 mg tablet Take 1 Tablet by mouth two (2) times a day. 8/26/21  Yes Court Caruso MD   amLODIPine (NORVASC) 2.5 mg tablet Take 1 Tablet by mouth daily. 7/16/21  Yes Court Caruso MD   atorvastatin (LIPITOR) 40 mg tablet Take 1 Tablet by mouth nightly. 7/16/21  Yes Court Caruso MD   carvediloL (COREG) 3.125 mg tablet Take 1 Tablet by mouth two (2) times daily (with meals). 7/16/21  Yes Court Caruso MD   busPIRone (BUSPAR) 5 mg tablet Take 1 Tab by mouth three (3) times daily. 4/29/21  Yes Toby Corado MD   gabapentin (NEURONTIN) 100 mg capsule Take 100 mg by mouth nightly. Yes Provider, Historical   melatonin 3 mg tablet Take 6 mg by mouth nightly. Yes Provider, Historical   nitroglycerin (Nitrostat) 0.4 mg SL tablet 0.4 mg by SubLINGual route every five (5) minutes as needed for Chest Pain. Up to 3 doses. Yes Provider, Historical   carbidopa-levodopa (SINEMET)  mg per tablet Take 2 Tabs by mouth four (4) times daily. 2/4/20  Yes Merry Leonard MD   cyanocobalamin (VITAMIN B12) 100 mcg tablet Take 100 mcg by mouth daily. Yes Provider, Historical   Cholecalciferol, Vitamin D3, 1,000 unit cap Take 1,000 Units by mouth daily. Yes Provider, Historical   pantoprazole (PROTONIX) 40 mg tablet Take 40 mg by mouth Daily (before breakfast). Indications: h/o bleeding ulcer with nsaid   Yes Provider, Historical   DULoxetine (CYMBALTA) 60 mg capsule Take 120 mg by mouth daily. Indications: anxiousness associated with depression   Yes Provider, Historical   multivitamins-minerals-lutein (CENTRUM SILVER) tab tablet Take 1 Tablet by mouth daily. Yes Provider, Historical   ARIPiprazole (ABILIFY) 20 mg tablet Take 20 mg by mouth daily.  8/18/21 Provider, Historical   cilostazoL (PLETAL) 100 mg tablet Take  by mouth Before breakfast and dinner. Provider, Historical   hydrALAZINE (APRESOLINE) 50 mg tablet Take 1 Tablet by mouth three (3) times daily. 7/16/21   Bing Abbasi MD   potassium chloride SR (KLOR-CON 10) 10 mEq tablet Take 20 mEq by mouth daily. Provider, Historical   clopidogreL (Plavix) 75 mg tab Take 75 mg by mouth daily (after breakfast). Patient not taking: Reported on 8/26/2021    Provider, Historical   acetaminophen (TYLENOL) 325 mg tablet Take 650 mg by mouth every six (6) hours as needed for Pain or Fever. Provider, Historical   vit C,L-Yg-ndopu-lutein-zeaxan (PRESERVISION AREDS-2) 310-124-72-1 mg-unit-mg-mg cap capsule Take 1 Cap by mouth two (2) times a day. Patient not taking: Reported on 7/16/2021    Provider, Historical   senna-docusate (SENNA WITH DOCUSATE SODIUM) 8.6-50 mg per tablet Take 1 Tab by mouth nightly. Patient not taking: Reported on 8/26/2021    Provider, Historical     No Known Allergies   Family History   Problem Relation Age of Onset    Heart Attack Mother 72        Dec 79yo    Hypertension Mother     Other Father         Unknown    Parkinson's Disease Brother     Anesth Problems Neg Hx         SOCIAL HISTORY:  Patient resides at [facility]. Objective:       Physical Exam:   Visit Vitals  BP (!) 153/61   Pulse 69   Temp 98.3 °F (36.8 °C)   Resp 19   Wt 78.9 kg (174 lb)   SpO2 95%   BMI 28.96 kg/m²     General:  Alert, cooperative, no distress, appears stated age. Head:  Normocephalic, without obvious abnormality, atraumatic. Eyes:  Conjunctivae/corneas clear. EOMs intact. Nose: Nares normal. Septum midline. Throat: Lips, mucosa, and tongue normal.    Neck: Supple, symmetrical, trachea midline,   Back:   Symmetric, no curvature. ROM normal.    Lungs:   Clear to auscultation bilaterally. 96% on 15 lpm   Chest wall:  No tenderness or deformity.    Heart:  Regular rate and rhythm, S1, S2 normal, no murmur, click, rub or gallop. Abdomen:   Soft, non-tender. Bowel sounds normal. No masses,  No organomegaly. Extremities: Extremities normal, atraumatic, no cyanosis. 1+ b/l LE pitting edema   Pulses: 2+ radial pulses   Skin: Skin color, texture, turgor normal. No rashes or lesions. ECG:  NSR, no hyperacute changes    Data Review: All diagnostic labs and studies have been reviewed. Chest x-ray: pulmonary edema with LLL airspace disease    Assessment:     Active Problems:    * No active hospital problems. *      Plan:     # Acute on Chronic Hypoxic Respiratory Failure  #pulmonary edema  #LLL airspace disease with effusion  -supplemental o2 prn  -s/p lasix 80mg IV  -strict I&O, daily weights  -continue azithromycin, and ceftriaxone  -Sputum Gram stain, and culture, urine strep, urine Legionella, MRSA nares, and blood cultures    #Hypertensive Emergency  #Elevated Troponin  -pt. Asymptomatic, EKG with no hyperacute changes  -likely demand from htn, pna, and HF  -trend trop (downtrending)  -continue hydralazine, and nitropaste  -resume home carvedilol and norvasc    #Dementia  #Mood d/o  -alert and oriented to person, place, year, .   She knows that she is in the hospital, but  does not know why she is in the hospital.  She denies any discomfort or sx.  -continue home melatonin,  cymbalta, and buspar      #Parkinsons  -continue home sinemet    #GERD  -continue PPI    #pAF  -continue eliquis  -carvedilol     FEN: cardiac  dvt ppx: lovenox  MPOA: Aleck Code listed as relative  Code: DNR      Signed By: Sarah Jordan MD     December 10, 2021

## 2021-12-10 NOTE — ED NOTES
Bedside and Verbal shift change report given to RN Tiffany Swartz (oncoming nurse) by Travis Orellana (offgoing nurse). Report included the following information SBAR, Kardex and ED Summary.

## 2021-12-10 NOTE — PROGRESS NOTES
0335: placed pt on 15/5, 100% bipap at this time, ABG pending    0357: ABG completed, results uploaded and shown to Dr Esme Yancey.  Settings adjusted and documented

## 2021-12-10 NOTE — NURSE NAVIGATOR
Chart reviewed by Heart Failure Nurse Navigator. Heart Failure database completed. EF:  Prior echo 8/2021 shows EF 50/55%    ACEi/ARB/ARNi: not currently indicated    BB: coreg 3.125 mg twice daily    Aldosterone Antagonist: not currently indicated    Obstructive Sleep Apnea Screening: screening priority 4, advanced age   STOP-BANG score:   Referred to Sleep Medicine:     CRT not currently indicated. NYHA Functional Class documentation requested     Heart Failure Teach Back in Patient Education. Heart Failure Avoiding Triggers on Discharge Instructions. Cardiologist: not yet consulted; has been followed by [facility] in the past      Post discharge follow up phone call to be made within 48-72 hours of discharge. Patient admitted from [place] [id number].

## 2021-12-11 LAB
BACTERIA SPEC CULT: NORMAL
BACTERIA SPEC CULT: NORMAL
SERVICE CMNT-IMP: NORMAL

## 2021-12-11 PROCEDURE — 74011250636 HC RX REV CODE- 250/636: Performed by: INTERNAL MEDICINE

## 2021-12-11 PROCEDURE — 74011250637 HC RX REV CODE- 250/637: Performed by: FAMILY MEDICINE

## 2021-12-11 PROCEDURE — 74011000250 HC RX REV CODE- 250: Performed by: FAMILY MEDICINE

## 2021-12-11 PROCEDURE — 74011250636 HC RX REV CODE- 250/636: Performed by: FAMILY MEDICINE

## 2021-12-11 PROCEDURE — 65660000000 HC RM CCU STEPDOWN

## 2021-12-11 PROCEDURE — 97162 PT EVAL MOD COMPLEX 30 MIN: CPT

## 2021-12-11 PROCEDURE — 97530 THERAPEUTIC ACTIVITIES: CPT

## 2021-12-11 PROCEDURE — 97161 PT EVAL LOW COMPLEX 20 MIN: CPT

## 2021-12-11 RX ORDER — FUROSEMIDE 10 MG/ML
40 INJECTION INTRAMUSCULAR; INTRAVENOUS
Status: DISCONTINUED | OUTPATIENT
Start: 2021-12-11 | End: 2021-12-18

## 2021-12-11 RX ORDER — HYDRALAZINE HYDROCHLORIDE 20 MG/ML
10 INJECTION INTRAMUSCULAR; INTRAVENOUS
Status: DISCONTINUED | OUTPATIENT
Start: 2021-12-11 | End: 2021-12-18

## 2021-12-11 RX ADMIN — CARBIDOPA AND LEVODOPA 2 TABLET: 25; 100 TABLET ORAL at 18:48

## 2021-12-11 RX ADMIN — FUROSEMIDE 40 MG: 10 INJECTION, SOLUTION INTRAMUSCULAR; INTRAVENOUS at 13:53

## 2021-12-11 RX ADMIN — DULOXETINE 120 MG: 60 CAPSULE, DELAYED RELEASE ORAL at 11:16

## 2021-12-11 RX ADMIN — Medication 6 MG: at 21:35

## 2021-12-11 RX ADMIN — PANTOPRAZOLE SODIUM 40 MG: 40 TABLET, DELAYED RELEASE ORAL at 08:17

## 2021-12-11 RX ADMIN — ATORVASTATIN CALCIUM 40 MG: 40 TABLET, FILM COATED ORAL at 21:35

## 2021-12-11 RX ADMIN — SODIUM CHLORIDE 1 G: 9 INJECTION INTRAMUSCULAR; INTRAVENOUS; SUBCUTANEOUS at 05:03

## 2021-12-11 RX ADMIN — APIXABAN 2.5 MG: 2.5 TABLET, FILM COATED ORAL at 11:16

## 2021-12-11 RX ADMIN — CARVEDILOL 3.12 MG: 3.12 TABLET, FILM COATED ORAL at 18:48

## 2021-12-11 RX ADMIN — BUSPIRONE HYDROCHLORIDE 5 MG: 5 TABLET ORAL at 18:48

## 2021-12-11 RX ADMIN — CARVEDILOL 3.12 MG: 3.12 TABLET, FILM COATED ORAL at 08:17

## 2021-12-11 RX ADMIN — APIXABAN 2.5 MG: 2.5 TABLET, FILM COATED ORAL at 18:48

## 2021-12-11 RX ADMIN — CARBIDOPA AND LEVODOPA 2 TABLET: 25; 100 TABLET ORAL at 21:35

## 2021-12-11 RX ADMIN — BUSPIRONE HYDROCHLORIDE 5 MG: 5 TABLET ORAL at 21:35

## 2021-12-11 RX ADMIN — CARBIDOPA AND LEVODOPA 2 TABLET: 25; 100 TABLET ORAL at 13:54

## 2021-12-11 RX ADMIN — AMLODIPINE BESYLATE 2.5 MG: 5 TABLET ORAL at 11:16

## 2021-12-11 RX ADMIN — HYDRALAZINE HYDROCHLORIDE 10 MG: 20 INJECTION INTRAMUSCULAR; INTRAVENOUS at 04:53

## 2021-12-11 RX ADMIN — Medication 10 ML: at 21:36

## 2021-12-11 RX ADMIN — AZITHROMYCIN MONOHYDRATE 500 MG: 500 INJECTION, POWDER, LYOPHILIZED, FOR SOLUTION INTRAVENOUS at 05:04

## 2021-12-11 RX ADMIN — Medication 10 ML: at 14:00

## 2021-12-11 RX ADMIN — Medication 10 ML: at 08:17

## 2021-12-11 RX ADMIN — CARBIDOPA AND LEVODOPA 2 TABLET: 25; 100 TABLET ORAL at 11:16

## 2021-12-11 RX ADMIN — GABAPENTIN 100 MG: 100 CAPSULE ORAL at 21:35

## 2021-12-11 RX ADMIN — BUSPIRONE HYDROCHLORIDE 5 MG: 5 TABLET ORAL at 11:16

## 2021-12-11 NOTE — ROUTINE PROCESS
TRANSFER - OUT REPORT:    Verbal report given to ASHLEE(name) on Rosemarie Goldman  being transferred to (unit) for routine progression of care       Report consisted of patients Situation, Background, Assessment and   Recommendations(SBAR). Information from the following report(s) SBAR, Kardex and ED Summary was reviewed with the receiving nurse. Lines:   Peripheral IV 12/10/21 Left Antecubital (Active)   Site Assessment Clean, dry, & intact 12/10/21 0348   Phlebitis Assessment 0 12/10/21 0348   Infiltration Assessment 0 12/10/21 0348   Dressing Status Clean, dry, & intact 12/10/21 0348   Dressing Type Transparent 12/10/21 0348   Hub Color/Line Status Pink 12/10/21 0348       Peripheral IV 12/10/21 Right Antecubital (Active)   Site Assessment Clean, dry, & intact 12/10/21 0348   Phlebitis Assessment 0 12/10/21 0348   Dressing Status Clean, dry, & intact 12/10/21 0348   Dressing Type Transparent 12/10/21 0348   Hub Color/Line Status Pink 12/10/21 0348   Action Taken Blood drawn 12/10/21 0348        Opportunity for questions and clarification was provided.       Patient transported with:   O2 @ 2 liters  Tech

## 2021-12-11 NOTE — PROGRESS NOTES
Primary Nurse Spencer Hussein RN and Aneita Buerger, RN performed a dual skin assessment on this patient impairment noted. Gregorio score is 17. Pink BLE with dry, flaky skin.

## 2021-12-11 NOTE — PROGRESS NOTES
Problem: Mobility Impaired (Adult and Pediatric)  Goal: *Acute Goals and Plan of Care (Insert Text)  Description: FUNCTIONAL STATUS PRIOR TO ADMISSION: The patient was functional at the wheelchair level and was modified independent for transfers to the wheelchair per pt report. Pt denies any falls in the last year. HOME SUPPORT PRIOR TO ADMISSION: The patient lived in [facility] due to her apartment lease ending in 2021 and is looking for new living arrangements. Pt reports she has a niece in the area that assist her. Physical Therapy Goals  Initiated 2021  1. Patient will move from supine to sit and sit to supine  in bed with moderate assistance  within 7 day(s). 2.  Patient will transfer from bed to chair and chair to bed with moderate assistance  using the least restrictive device within 7 day(s). 3.  Patient will perform sit to stand with moderate assistance  within 7 day(s). Outcome: Progressing Towards Goal   PHYSICAL THERAPY EVALUATION  Patient: Joseph Turner (66 y.o. female)  Date: 2021  Primary Diagnosis: Acute on chronic respiratory failure with hypoxia (HCC) [J96.21]  Elevated troponin [R77.8]  CAP (community acquired pneumonia) [J18.9]        Precautions:   Contact, fall      ASSESSMENT  Based on the objective data described below, the patient presents with decreased generalized strength, decreased active ROM bilateral shoulders, hip, and knees, rigidity due to Parkinson's disease, decreased sitting and standing balance, and impaired functional mobility below her stated baseline level. Pt reports she was living in her apartment until her lease  at the end of 2021. She states she used a wheelchair as her primary means of mobility and was modified independent with transfers though stating it took her a while to complete transitions. Pt is currently residing in [facility] until she has time to make a new living arrangement.  Pt was admitted due to dyspnea, and is being admitted for acute on chronic hypoxic respiratory failure secondary to pulmonary edema, and CAP. Pt received resting in bed and agreeable to participate with PT. She had significant difficulty with bed mobility requiring 2 person assistance of maximum assist. Pt required 2 person assist to transfer to standing position with bilateral LE sliding forward and excessive posterior lean. She stood in place with a rolling walker and was unable to find her COG and continued to lean posteriorly. Pt was unable to initiate transfer to chair or side steps along side of bed. Pt was lowered to a sitting position and transferred to supine with maximum assist x 2. SPO2 stable with 2 LPM via nc, 97-98% and denies SOB. RN present at session end. Current Level of Function Impacting Discharge (mobility/balance): bed mobility maximum assist x 2, transfers sit <> stand with maximum assist x 2    Functional Outcome Measure: The patient scored Total Score: 1/28 on the Tinetti outcome measure which is indicative of high fall risk. Other factors to consider for discharge: well below her stated baseline level of function, high fall risk     Patient will benefit from skilled therapy intervention to address the above noted impairments. PLAN :  Recommendations and Planned Interventions: bed mobility training, transfer training, and patient and family training/education      Frequency/Duration: Patient will be followed by physical therapy:  5 times a week to address goals. Recommendation for discharge: (in order for the patient to meet his/her long term goals)  Therapy up to 5 days/week in SNF setting    This discharge recommendation:  A follow-up discussion with the attending provider and/or case management is planned           SUBJECTIVE:   Patient stated I could do it by myself, it just takes me a while.     OBJECTIVE DATA SUMMARY:   HISTORY:    Past Medical History:   Diagnosis Date Anxiety disorder     Atrial fibrillation (HCC)     CAD (coronary artery disease) 2007    stents, CABG x 3v    Carotid stenosis     Cervical stenosis of spinal canal 07/2019    Chronic kidney disease     Cough     CVA (cerebral vascular accident) ([facility] [address] 07/2019    left lacunar infarct at head of caudate    Depression     AND CHRONIC ANXIETY    Diabetes (HCC)     GERD (gastroesophageal reflux disease)     High cholesterol     History of peptic ulcer     Bleeding ulcer with increased NSAID use    Hypertension     Left carotid stenosis 07/2019    s/p left CEA with Dr. Paddy Dunlap, old 2007    PUD (peptic ulcer disease)     Stroke ([facility] [address]     Tremor     Valvular heart disease      Past Surgical History:   Procedure Laterality Date    HX CAROTID ENDARTERECTOMY  07/2019    HX CORONARY ARTERY BYPASS GRAFT      HX TONSILLECTOMY  1963    GA CARDIAC SURG PROCEDURE UNLIST      CABG X3 VESSEL    GA TOTAL KNEE ARTHROPLASTY Right 2015       Personal factors and/or comorbidities impacting plan of care: Parkinson's disease, high fall risk    Home Situation  Home Environment: [address] [place] [address] Name: [facility]  # Steps to Enter: 0  One/Two Story Residence: One story  Living Alone: No  Support Systems: [place], Other Family Member(s) (niece lives locally)  Patient Expects to be Discharged to[de-identified] Skilled nursing facility  Current DME Used/Available at Home: [address], rolling, Wheelchair  Tub or Shower Type: Shower    EXAMINATION/PRESENTATION/DECISION MAKING:   Critical Behavior:  Neurologic State: Alert  Orientation Level: Oriented X4  Cognition: Appropriate for age attention/concentration, Follows commands  Safety/Judgement: Decreased awareness of need for safety, Decreased insight into deficits    Range Of Motion:  AROM: Generally decreased, functional, decreased bilateral shoulder, hips, and knees, rigidity                        Strength:    Strength: Generally decreased, functional with limited ROM                    Tone & Sensation:   Tone: Abnormal (rigidity due to Parkinson's disease)              Sensation: Intact               Coordination:  Coordination: Generally decreased, functional       Functional Mobility:  Bed Mobility:     Supine to Sit: Assist x2; Additional time; Adaptive equipment; Maximum assistance  Sit to Supine: Maximum assistance; Assist x2; Additional time; Adaptive equipment  Scooting: Maximum assistance; Assist x2  Transfers:  Sit to Stand: Maximum assistance; Assist x2; Additional time; Adaptive equipment (excessive posterior lean, feet sliding forward requiring block to avoid slipping forward and falling)  Stand to Sit: Moderate assistance; Assist x2                       Balance:   Sitting: Impaired  Sitting - Static: Fair (occasional)  Sitting - Dynamic: Poor (constant support)  Standing: Impaired  Standing - Static: Constant support; Poor  Standing - Dynamic : Constant support; Poor                Functional Measure:  Tinetti test:    Sitting Balance: 1  Arises: 0  Attempts to Rise: 0  Immediate Standing Balance: 0  Standing Balance: 0  Nudged: 0  Eyes Closed: 0  Turn 360 Degrees - Continuous/Discontinuous: 0  Turn 360 Degrees - Steady/Unsteady: 0  Sitting Down: 0  Balance Score: 1 Balance total score  Indication of Gait: 0  R Step Length/Height: 0  L Step Length/Height: 0  R Foot Clearance: 0  L Foot Clearance: 0  Step Symmetry: 0  Step Continuity: 0  Path: 0  Trunk: 0  Walking Time: 0  Gait Score: 0 Gait total score  Total Score: 1/28 Overall total score         Tinetti Tool Score Risk of Falls  <19 = High Fall Risk  19-24 = Moderate Fall Risk  25-28 = Low Fall Risk  Tinetti ME. Performance-Oriented Assessment of Mobility Problems in Elderly Patients. [address]; [id number].  (Scoring Description: PT Bulletin Feb. 10, 1993)    Older adults: Pb Bishop et al, 2009; n = [address] [facility] elderly evaluated with ABC, KATJA, ADL, and IADL)  · Mean KATJA score for males aged 69-68 years = 26.21(3.40)  · Mean KATJA score for females age 69-68 years = 25.16(4.30)  · Mean KATJA score for males over 80 years = 23.29(6.02)  · Mean KATJA score for females over 80 years = 17.20(8.32)        Physical Therapy Evaluation Charge Determination   History Examination Presentation Decision-Making   HIGH Complexity :3+ comorbidities / personal factors will impact the outcome/ POC  HIGH Complexity : 4+ Standardized tests and measures addressing body structure, function, activity limitation and / or participation in recreation  MEDIUM Complexity : Evolving with changing characteristics  MEDIUM Complexity : FOTO score of 26-74      Based on the above components, the patient evaluation is determined to be of the following complexity level: MEDIUM    Pain Rating:  No complaints     Activity Tolerance:   Fair  Vitals:    12/11/21 1200 12/11/21 1335 12/11/21 1400 12/11/21 1612   BP:  (!) 154/73  (!) 162/76   BP 1 Location:  Left upper arm  Right upper arm   BP Patient Position:  At rest; Lying  Supine; At rest   Pulse: 77 68 73    Temp:  97.8 °F (36.6 °C)     Resp:  18     Weight:       SpO2:  97%  98%        After treatment patient left in no apparent distress:   Supine in bed, Call bell within reach, Bed / chair alarm activated, and Caregiver / family present    COMMUNICATION/EDUCATION:   The patients plan of care was discussed with: Registered nurse. Fall prevention education was provided and the patient/caregiver indicated understanding. and Patient/family agree to work toward stated goals and plan of care.     Thank you for this referral.  Shadia Marquez Grade   Time Calculation: 32 mins

## 2021-12-11 NOTE — PROGRESS NOTES
[address] [facility] Adult  Hospitalist Group                                                                                          Hospitalist Progress Note  Ailyn Frias MD  Answering service: [phone] from in house phone        Date of Service:  2021  NAME:  Keyur Rodriguez  :  1940  MRN:  [id number]      Admission Summary:   Copied form admit: \" Keyur Rodriguez is a [de-identified] y.o. 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 Per record, she was picked up from [facility] where she was found to be hypoxic to the fifties on her normal 2 L nasal cannula.     She was recently admitted from 10/29 to 11/3 for sepsis 2/2 proteus UTI. \"    Interval history / Subjective:     2021 :    bno distress    Anxious to be discharged   On low 02 replacement  2021    Cont abx, card  meds  Eleno Hatchet as below        Assessment & Plan:        # Acute on Chronic Hypoxic Respiratory Failure  #pulmonary edema  #LLL airspace disease with effusion  -supplemental o2 prn  -s/p lasix 80mg IV  -strict I&O, daily weights  -continue azithromycin, and ceftriaxone  -Sputum Gram stain, and culture, urine strep, urine Legionella, MRSA nares, and blood cultures     #Hypertensive Emergency  #Elevated Troponin  -pt. Asymptomatic, EKG with no hyperacute changes  -likely demand from htn, pna, and HF  -trend trop (downtrending)  -continue hydralazine, and nitropaste  -resume home carvedilol and norvasc  -  F/u in am ;aysmptomatic elevation at this time 2021      #Dementia  #Mood d/o  -alert and oriented to person, place, year, .   She knows that she is in the hospital, but  does not know why she is in the hospital.  She denies any discomfort or sx.  -continue home melatonin,  cymbalta, and buspar  - call to MPOA placed 12/11/2021   [facility] Rai RelativeAttachYesLegal Guardian?:    Primary Phone: [phone] [facility])   Home Phone: [phone]   Mobile Phone: [phone]   Preferred Language:     Needed?:    Allow PHI?:            #Parkinsons  -continue home sinemet     #GERD  -continue PPI     #pAF  -continue eliquis  -carvedilol      FEN: cardiac  dvt ppx: lovenox  MPOA: Cheko Marino listed as relative  Code: DNR        Hospital Problems  Date Reviewed: 8/26/2021          Codes Class Noted POA    CAP (community acquired pneumonia) ICD-10-CM: J18.9  ICD-9-CM: 609  12/10/2021 Unknown        Acute on chronic respiratory failure with hypoxia [facility]) ICD-10-CM: J96.21  ICD-9-CM: 518.84, 799.02  12/10/2021 Unknown        Elevated troponin ICD-10-CM: R77.8  ICD-9-CM: 790.6  6/3/2014 Unknown                  After personally interviewing pt at bedside the following is noted on 12/11/2021 :    Review of Systems   Constitutional: Negative for chills and fever. Respiratory: Positive for sputum production and shortness of breath. Negative for cough and hemoptysis. Cardiovascular: Negative for chest pain, claudication and leg swelling. Neurological: Positive for weakness. Negative for focal weakness. All other systems reviewed and are negative. I had a face to face encounter with patient on 12/11/2021 at bedside for the following physical exam:     PHYSICAL EXAM:    Visit Vitals  BP (P) 127/80   Pulse 64   Temp 97.5 °F (36.4 °C)   Resp 16   Wt 78.9 kg (174 lb)   SpO2 96%   BMI 28.96 kg/m²          Physical Exam  Constitutional:       General: She is not in acute distress. Appearance: She is obese. She is not ill-appearing, toxic-appearing or diaphoretic. HENT:      Head: Normocephalic and atraumatic.       Right Ear: External ear normal.      Left Ear: External ear normal.      Mouth/Throat: Mouth: Mucous membranes are dry. Eyes:      Extraocular Movements: Extraocular movements intact. Conjunctiva/sclera: Conjunctivae normal.      Pupils: Pupils are equal, round, and reactive to light. Cardiovascular:      Rate and Rhythm: Normal rate and regular rhythm. Pulmonary:      Comments: basilar crackle,  Abdominal:      General: Abdomen is flat. Bowel sounds are normal.      Palpations: Abdomen is soft. Musculoskeletal:         General: No swelling. Cervical back: Normal range of motion and neck supple. Skin:     General: Skin is warm and dry. Neurological:      General: No focal deficit present. Mental Status: Mental status is at baseline. Psychiatric:         Mood and Affect: Mood normal.         Behavior: Behavior normal.                     Data Review:    Review and/or order of clinical lab test      Labs:     Recent Labs     12/10/21  0338   WBC 15.7*   HGB 9.3*   HCT 29.1*        Recent Labs     12/10/21  0338      K 4.9   *   CO2 20*   BUN 27*   CREA 1.94*   *   CA 9.1   MG 1.9   PHOS 4.2     Recent Labs     12/10/21  0338   ALT 10*   AP 94   TBILI 0.9   TP 6.9   ALB 3.3*   GLOB 3.6     No results for input(s): INR, PTP, APTT, INREXT in the last 72 hours. No results for input(s): FE, TIBC, PSAT, FERR in the last 72 hours. Lab Results   Component Value Date/Time    Folate 19.7 03/31/2021 03:55 AM      No results for input(s): PH, PCO2, PO2 in the last 72 hours. No results for input(s): CPK, CKNDX, TROIQ in the last 72 hours.     No lab exists for component: CPKMB  Lab Results   Component Value Date/Time    Cholesterol, total 148 09/23/2020 04:27 AM    HDL Cholesterol 52 09/23/2020 04:27 AM    LDL, calculated 83.8 09/23/2020 04:27 AM    Triglyceride 61 09/23/2020 04:27 AM    CHOL/HDL Ratio 2.8 09/23/2020 04:27 AM     Lab Results   Component Value Date/Time    Glucose (POC) 120 (H) 04/24/2021 04:54 PM    Glucose (POC) 176 (H) 04/24/2021 11:16 AM Glucose (POC) 111 (H) 04/24/2021 08:45 AM    Glucose (POC) 138 (H) 04/02/2021 11:45 AM    Glucose (POC) 107 (H) 04/01/2021 09:16 PM     Lab Results   Component Value Date/Time    Color YELLOW/STRAW 10/29/2021 07:16 PM    Appearance CLOUDY (A) 10/29/2021 07:16 PM    Specific gravity 1.009 10/29/2021 07:16 PM    pH (UA) 5.0 10/29/2021 07:16 PM    Protein 100 (A) 10/29/2021 07:16 PM    Glucose Negative 10/29/2021 07:16 PM    Ketone Negative 10/29/2021 07:16 PM    Bilirubin Negative 10/29/2021 07:16 PM    Urobilinogen 0.2 10/29/2021 07:16 PM    Nitrites Negative 10/29/2021 07:16 PM    Leukocyte Esterase MODERATE (A) 10/29/2021 07:16 PM    Epithelial cells MODERATE (A) 10/29/2021 07:16 PM    Bacteria 2+ (A) 10/29/2021 07:16 PM    WBC 20-50 10/29/2021 07:16 PM    RBC 0-5 10/29/2021 07:16 PM         Medications Reviewed:     Current Facility-Administered Medications   Medication Dose Route Frequency    hydrALAZINE (APRESOLINE) 20 mg/mL injection 10 mg  10 mg IntraVENous Q6H PRN    furosemide (LASIX) injection 40 mg  40 mg IntraVENous Q MON, WED & SAT    sodium chloride (NS) flush 5-40 mL  5-40 mL IntraVENous Q8H    sodium chloride (NS) flush 5-40 mL  5-40 mL IntraVENous PRN    acetaminophen (TYLENOL) tablet 650 mg  650 mg Oral Q6H PRN    Or    acetaminophen (TYLENOL) suppository 650 mg  650 mg Rectal Q6H PRN    polyethylene glycol (MIRALAX) packet 17 g  17 g Oral DAILY PRN    ondansetron (ZOFRAN ODT) tablet 4 mg  4 mg Oral Q8H PRN    Or    ondansetron (ZOFRAN) injection 4 mg  4 mg IntraVENous Q6H PRN    cefTRIAXone (ROCEPHIN) 1 g in 0.9% sodium chloride 10 mL IV syringe  1 g IntraVENous Q24H    azithromycin (ZITHROMAX) 500 mg in 0.9% sodium chloride 250 mL (VIAL-MATE)  500 mg IntraVENous Q24H    apixaban (ELIQUIS) tablet 2.5 mg  2.5 mg Oral BID    amLODIPine (NORVASC) tablet 2.5 mg  2.5 mg Oral DAILY    atorvastatin (LIPITOR) tablet 40 mg  40 mg Oral QHS    busPIRone (BUSPAR) tablet 5 mg  5 mg Oral TID  carbidopa-levodopa (SINEMET)  mg per tablet 2 Tablet  2 Tablet Oral QID    carvediloL (COREG) tablet 3.125 mg  3.125 mg Oral BID WITH MEALS    DULoxetine (CYMBALTA) capsule 120 mg  120 mg Oral DAILY    gabapentin (NEURONTIN) capsule 100 mg  100 mg Oral QHS    melatonin tablet 6 mg  6 mg Oral QHS    pantoprazole (PROTONIX) tablet 40 mg  40 mg Oral ACB     ______________________________________________________________________  EXPECTED LENGTH OF STAY: - - -  ACTUAL LENGTH OF STAY:          1                 Cade Albert MD

## 2021-12-11 NOTE — PROGRESS NOTES
Transition of Care Plan   RUR- 23% High Risk   DISPOSITION: The disposition plan is pending medical progression and recommendation.  F/U with PCP/Specialist     Transport: [facility]/family     Reason for Admission:  \"Problem with breathing and anxiety. \"                   RUR Score: 23% High Risk                   Plan for utilizing home health: N/A         PCP: First and Last name:  Austin Sun DO     Name of Practice: [facility]   Are you a current patient: Yes/No: Yes   Approximate date of last visit: November 2021   Can you participate in a virtual visit with your PCP: N/A                    Current Advanced Directive/Advance Care Plan: DNR  Has ACP documentation on file at this time. Healthcare Decision Maker:   Click here to complete [address] including selection of the Healthcare Decision Maker Relationship (ie \"Primary\")             Primary Decision Maker: Prince Giovanna - Other Relative - [phone]                  Transition of Care Plan:  Pending recommendation                    Reviewed chart for transitions of care and discussed in rounds. CM met with patient at bedside to explain role and offer support. Patient is alert and oriented x4 and confirmed demographics. Baseline: DME - wheelchair and walker  ADLs/IDALS: [address]: Yes  Previous SNF/IPR: N/A  ER Contact: Kalyan Curtis Reason - [phone]    Patient lives in an apartment alone. Patient reports that her lease ended at the end of November 2021. Patient reports that she has somewhere to stay temporarily. Patient is independent with ADLs/IDALs. Patient uses DMEs (wheelchair and walker) to ambulate. Patient's preferred pharmacy is [address] for her prescriptions. A cab is expected to transport at discharge. Care Management Interventions  PCP Verified by CM:  Yes  Palliative Care Criteria Met (RRAT>21 & CHF Dx)?: No  Mode of Transport at Discharge: ALS  Transition of Care Consult (CM Consult): Discharge Planning  MyChart Signup: No  Discharge Durable Medical Equipment: No  Physical Therapy Consult: Yes  Occupational Therapy Consult: Yes  Speech Therapy Consult: No  Support Systems: Other Family Member(s)  Confirm Follow Up Transport: Family  The Patient and/or Patient Representative was Provided with a Choice of Provider and Agrees with the Discharge Plan?: Yes  Freedom of Choice List was Provided with Basic Dialogue that Supports the Patient's Individualized Plan of Care/Goals, Treatment Preferences and Shares the Quality Data Associated with the Providers?: Yes  The [employer] Information Provided?: No    CM will continue provide updates as they arise. CM will continue to follow, provide support and assist with ANDREA needs as they arise.     LADI Bernardo, CRM, LMHP-e

## 2021-12-12 LAB
ALBUMIN SERPL-MCNC: 3.4 G/DL (ref 3.5–5)
ALBUMIN/GLOB SERPL: 1.2 {RATIO} (ref 1.1–2.2)
ALP SERPL-CCNC: 95 U/L (ref 45–117)
ALT SERPL-CCNC: 9 U/L (ref 12–78)
ANION GAP SERPL CALC-SCNC: 8 MMOL/L (ref 5–15)
AST SERPL-CCNC: 17 U/L (ref 15–37)
ATRIAL RATE: 70 BPM
BASOPHILS # BLD: 0 K/UL (ref 0–0.1)
BASOPHILS NFR BLD: 0 % (ref 0–1)
BILIRUB SERPL-MCNC: 0.5 MG/DL (ref 0.2–1)
BNP SERPL-MCNC: ABNORMAL PG/ML
BUN SERPL-MCNC: 30 MG/DL (ref 6–20)
BUN/CREAT SERPL: 17 (ref 12–20)
CALCIUM SERPL-MCNC: 8.7 MG/DL (ref 8.5–10.1)
CALCULATED P AXIS, ECG09: 68 DEGREES
CALCULATED R AXIS, ECG10: -60 DEGREES
CALCULATED T AXIS, ECG11: 84 DEGREES
CHLORIDE SERPL-SCNC: 107 MMOL/L (ref 97–108)
CO2 SERPL-SCNC: 23 MMOL/L (ref 21–32)
CREAT SERPL-MCNC: 1.77 MG/DL (ref 0.55–1.02)
DIAGNOSIS, 93000: NORMAL
DIFFERENTIAL METHOD BLD: ABNORMAL
EOSINOPHIL # BLD: 0.3 K/UL (ref 0–0.4)
EOSINOPHIL NFR BLD: 4 % (ref 0–7)
ERYTHROCYTE [DISTWIDTH] IN BLOOD BY AUTOMATED COUNT: 14.7 % (ref 11.5–14.5)
GLOBULIN SER CALC-MCNC: 2.8 G/DL (ref 2–4)
GLUCOSE SERPL-MCNC: 114 MG/DL (ref 65–100)
HCT VFR BLD AUTO: 25.1 % (ref 35–47)
HGB BLD-MCNC: 8 G/DL (ref 11.5–16)
IMM GRANULOCYTES # BLD AUTO: 0 K/UL (ref 0–0.04)
IMM GRANULOCYTES NFR BLD AUTO: 0 % (ref 0–0.5)
LYMPHOCYTES # BLD: 0.9 K/UL (ref 0.8–3.5)
LYMPHOCYTES NFR BLD: 13 % (ref 12–49)
MCH RBC QN AUTO: 32 PG (ref 26–34)
MCHC RBC AUTO-ENTMCNC: 31.9 G/DL (ref 30–36.5)
MCV RBC AUTO: 100.4 FL (ref 80–99)
MONOCYTES # BLD: 0.7 K/UL (ref 0–1)
MONOCYTES NFR BLD: 10 % (ref 5–13)
NEUTS SEG # BLD: 5 K/UL (ref 1.8–8)
NEUTS SEG NFR BLD: 73 % (ref 32–75)
NRBC # BLD: 0 K/UL (ref 0–0.01)
NRBC BLD-RTO: 0 PER 100 WBC
P-R INTERVAL, ECG05: 164 MS
PLATELET # BLD AUTO: 274 K/UL (ref 150–400)
PMV BLD AUTO: 9.1 FL (ref 8.9–12.9)
POTASSIUM SERPL-SCNC: 4.4 MMOL/L (ref 3.5–5.1)
PROT SERPL-MCNC: 6.2 G/DL (ref 6.4–8.2)
Q-T INTERVAL, ECG07: 412 MS
QRS DURATION, ECG06: 102 MS
QTC CALCULATION (BEZET), ECG08: 444 MS
RBC # BLD AUTO: 2.5 M/UL (ref 3.8–5.2)
SODIUM SERPL-SCNC: 138 MMOL/L (ref 136–145)
TROPONIN-HIGH SENSITIVITY: 972 NG/L (ref 0–51)
VENTRICULAR RATE, ECG03: 70 BPM
WBC # BLD AUTO: 6.9 K/UL (ref 3.6–11)

## 2021-12-12 PROCEDURE — 74011250636 HC RX REV CODE- 250/636: Performed by: FAMILY MEDICINE

## 2021-12-12 PROCEDURE — 83880 ASSAY OF NATRIURETIC PEPTIDE: CPT

## 2021-12-12 PROCEDURE — 85025 COMPLETE CBC W/AUTO DIFF WBC: CPT

## 2021-12-12 PROCEDURE — 80053 COMPREHEN METABOLIC PANEL: CPT

## 2021-12-12 PROCEDURE — 36415 COLL VENOUS BLD VENIPUNCTURE: CPT

## 2021-12-12 PROCEDURE — 65660000000 HC RM CCU STEPDOWN

## 2021-12-12 PROCEDURE — 74011250637 HC RX REV CODE- 250/637: Performed by: INTERNAL MEDICINE

## 2021-12-12 PROCEDURE — 84484 ASSAY OF TROPONIN QUANT: CPT

## 2021-12-12 PROCEDURE — 74011000250 HC RX REV CODE- 250: Performed by: FAMILY MEDICINE

## 2021-12-12 PROCEDURE — 74011250637 HC RX REV CODE- 250/637: Performed by: FAMILY MEDICINE

## 2021-12-12 RX ORDER — ARIPIPRAZOLE 5 MG/1
20 TABLET ORAL DAILY
Status: DISCONTINUED | OUTPATIENT
Start: 2021-12-12 | End: 2021-12-21 | Stop reason: HOSPADM

## 2021-12-12 RX ORDER — HYDRALAZINE HYDROCHLORIDE 25 MG/1
50 TABLET, FILM COATED ORAL 3 TIMES DAILY
Status: DISCONTINUED | OUTPATIENT
Start: 2021-12-12 | End: 2021-12-21 | Stop reason: HOSPADM

## 2021-12-12 RX ADMIN — ACETAMINOPHEN 650 MG: 325 TABLET ORAL at 11:39

## 2021-12-12 RX ADMIN — PANTOPRAZOLE SODIUM 40 MG: 40 TABLET, DELAYED RELEASE ORAL at 06:58

## 2021-12-12 RX ADMIN — ARIPIPRAZOLE 20 MG: 5 TABLET ORAL at 14:09

## 2021-12-12 RX ADMIN — APIXABAN 2.5 MG: 2.5 TABLET, FILM COATED ORAL at 11:43

## 2021-12-12 RX ADMIN — BUSPIRONE HYDROCHLORIDE 5 MG: 5 TABLET ORAL at 11:43

## 2021-12-12 RX ADMIN — CARBIDOPA AND LEVODOPA 2 TABLET: 25; 100 TABLET ORAL at 23:43

## 2021-12-12 RX ADMIN — SODIUM CHLORIDE 1 G: 9 INJECTION INTRAMUSCULAR; INTRAVENOUS; SUBCUTANEOUS at 05:05

## 2021-12-12 RX ADMIN — CARBIDOPA AND LEVODOPA 2 TABLET: 25; 100 TABLET ORAL at 11:39

## 2021-12-12 RX ADMIN — Medication 5 ML: at 14:00

## 2021-12-12 RX ADMIN — CARVEDILOL 3.12 MG: 3.12 TABLET, FILM COATED ORAL at 18:37

## 2021-12-12 RX ADMIN — BUSPIRONE HYDROCHLORIDE 5 MG: 5 TABLET ORAL at 23:43

## 2021-12-12 RX ADMIN — HYDRALAZINE HYDROCHLORIDE 50 MG: 50 TABLET, FILM COATED ORAL at 11:39

## 2021-12-12 RX ADMIN — Medication 10 ML: at 23:50

## 2021-12-12 RX ADMIN — CARBIDOPA AND LEVODOPA 2 TABLET: 25; 100 TABLET ORAL at 18:36

## 2021-12-12 RX ADMIN — ATORVASTATIN CALCIUM 40 MG: 40 TABLET, FILM COATED ORAL at 23:43

## 2021-12-12 RX ADMIN — BUSPIRONE HYDROCHLORIDE 5 MG: 5 TABLET ORAL at 18:44

## 2021-12-12 RX ADMIN — Medication 10 ML: at 05:07

## 2021-12-12 RX ADMIN — ONDANSETRON 4 MG: 4 TABLET, ORALLY DISINTEGRATING ORAL at 11:39

## 2021-12-12 RX ADMIN — AZITHROMYCIN MONOHYDRATE 500 MG: 500 INJECTION, POWDER, LYOPHILIZED, FOR SOLUTION INTRAVENOUS at 05:06

## 2021-12-12 RX ADMIN — AMLODIPINE BESYLATE 2.5 MG: 5 TABLET ORAL at 11:43

## 2021-12-12 RX ADMIN — HYDRALAZINE HYDROCHLORIDE 50 MG: 50 TABLET, FILM COATED ORAL at 18:36

## 2021-12-12 RX ADMIN — GABAPENTIN 100 MG: 100 CAPSULE ORAL at 23:43

## 2021-12-12 RX ADMIN — HYDRALAZINE HYDROCHLORIDE 50 MG: 50 TABLET, FILM COATED ORAL at 23:49

## 2021-12-12 RX ADMIN — APIXABAN 2.5 MG: 2.5 TABLET, FILM COATED ORAL at 18:36

## 2021-12-12 RX ADMIN — DULOXETINE 120 MG: 60 CAPSULE, DELAYED RELEASE ORAL at 12:08

## 2021-12-12 RX ADMIN — CARVEDILOL 3.12 MG: 3.12 TABLET, FILM COATED ORAL at 07:00

## 2021-12-12 NOTE — PROGRESS NOTES
ANDREA: SNF referral to [facility] pending. The patient and niece, sandy South plan for the patient to return to [facility] for rehab services with BLS vs family to transport when stable for discharge. RUR: 24%    CM spoke to the patient and she was somewhat confused at this time. CM called and spoke to the patient's niece, Liz South ([phone]). Alexander Hughes stated the patient was at [facility] and she would like her to return there to resume rehab. CM called [facility] and spoke to a floor nurse (admission personal not working) that confirmed the patient was just there and came to the hospital. CM sent referral via [address]. CM following for discharge needs.      Dustin Rausch RN/CRM

## 2021-12-12 NOTE — PROGRESS NOTES
No complaints offered. IV site changed this am to Lt upper arm 22 gauge saline lock and Rt AC saline lock d/c'd due to not functioning. O2 2L NC has pt at 96% after therapy. PT tried to stand pt with walker but barely able to stand so put back to bed. [place] in place. IV lasix given as ordered and pt filled 2L on wall suction. Appetite good and resting at present. Bedside shift change report given to Katie Curiel RN (oncoming nurse) by Kenny Mcnamara (offgoing nurse). Report included the following information SBAR, Kardex, Intake/Output and MAR.

## 2021-12-12 NOTE — PROGRESS NOTES
[facility] Adult  Hospitalist Group                                                                                          Hospitalist Progress Note  Estela Sosa MD  Answering service: [phone] OR [phone] from in house phone        Date of Service:  2021  NAME:  Ashlie Aguayo  :  1940  MRN:  [id number]      Admission Summary:   Copied form admit: \" Ashlie Aguayo is a [de-identified] y.o. 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 Per record, she was picked up from [facility] where she was found to be hypoxic to the fifties on her normal 2 L nasal cannula.     She was recently admitted from 10/29 to 11/3 for sepsis 2/2 proteus UTI. \"    Interval history / Subjective:     2021 :    Preformed poorly on PT eval , - SNF placement needed   No distress, sleeping comfortably, VS stable afeb, wbc down  To 6 K    Home Abilify restarted   Case advised to search for SNF placement options        Assessment & Plan:        # Acute on Chronic Hypoxic Respiratory Failure  #pulmonary edema  #LLL airspace disease with effusion  -supplemental o2 prn  -s/p lasix 80mg IV  -strict I&O, daily weights  -continue azithromycin, and ceftriaxone  -Sputum Gram stain, and culture, urine strep, urine Legionella, MRSA nares, and blood cultures  -No distress, sleeping comfortably, VS stable afeb, wbc down  To 6 K      #Hypertensive Emergency  #Elevated Troponin  -pt.  Asymptomatic, EKG with no hyperacute changes  -likely demand from htn, pna, and HF  -trend trop (downtrending)  -continue hydralazine, and nitropaste  -resume home carvedilol and norvasc  -  F/u in am ;aysmptomatic elevation at this time 2021 - favorable trend    #Dementia- restart home Ability 2021    #Mood d/o  -alert and oriented to person, place, year, . She knows that she is in the hospital, but  does not know why she is in the hospital.  She denies any discomfort or sx.  -continue home melatonin,  cymbalta, and buspar  - call to MPOA placed 2021   Natalee Pastor RelativeAttachYesLegal Guardian?:    Primary Phone: [phone] (M)   Home Phone: [phone]   Mobile Phone: [phone]   Preferred Language:     Needed?:    Allow PHI?:            #Parkinsons  -continue home sinemet     #GERD  -continue PPI     #pAF  -continue eliquis  -carvedilol      FEN: cardiac  dvt ppx: lovenox  MPOA: Karole Ahr listed as relative  Code: DNR        Hospital Problems  Date Reviewed: 2021          Codes Class Noted POA    CAP (community acquired pneumonia) ICD-10-CM: J18.9  ICD-9-CM: 405  12/10/2021 Unknown        Acute on chronic respiratory failure with hypoxia [facility]) ICD-10-CM: J96.21  ICD-9-CM: 518.84, 799.02  12/10/2021 Unknown        Elevated troponin ICD-10-CM: R77.8  ICD-9-CM: 790.6  6/3/2014 Unknown                  After personally interviewing pt at bedside the following is noted on 2021 :    Review of Systems   Reason unable to perform ROS: sound asleep               I had a face to face encounter with patient on 2021 at bedside for the following physical exam:     PHYSICAL EXAM:    Visit Vitals  BP (!) 162/74   Pulse 68   Temp 98.1 °F (36.7 °C)   Resp 18   Wt 78.9 kg (174 lb)   SpO2 96%   BMI 28.96 kg/m²          Physical Exam  Constitutional:       General: She is not in acute distress. Appearance: She is not ill-appearing, toxic-appearing or diaphoretic. HENT:      Head: Normocephalic and atraumatic. Right Ear: External ear normal.      Left Ear: External ear normal.      Nose: Nose normal.   Eyes:      General:         Right eye: Discharge present. Left eye: No discharge.    Pulmonary: Effort: Pulmonary effort is normal. No respiratory distress. Musculoskeletal:         General: No swelling or deformity. Skin:     Coloration: Skin is not jaundiced. Neurological:      Comments: Asleep                      Data Review:    Review and/or order of clinical lab test      Labs:     Recent Labs     12/12/21  0443 12/10/21  0338   WBC 6.9 15.7*   HGB 8.0* 9.3*   HCT 25.1* 29.1*    296     Recent Labs     12/12/21  0443 12/10/21  0338    136   K 4.4 4.9    111*   CO2 23 20*   BUN 30* 27*   CREA 1.77* 1.94*   * 174*   CA 8.7 9.1   MG  --  1.9   PHOS  --  4.2     Recent Labs     12/12/21  0443 12/10/21  0338   ALT 9* 10*   AP 95 94   TBILI 0.5 0.9   TP 6.2* 6.9   ALB 3.4* 3.3*   GLOB 2.8 3.6     No results for input(s): INR, PTP, APTT, INREXT, INREXT in the last 72 hours. No results for input(s): FE, TIBC, PSAT, FERR in the last 72 hours. Lab Results   Component Value Date/Time    Folate 19.7 03/31/2021 03:55 AM      No results for input(s): PH, PCO2, PO2 in the last 72 hours. No results for input(s): CPK, CKNDX, TROIQ in the last 72 hours.     No lab exists for component: CPKMB  Lab Results   Component Value Date/Time    Cholesterol, total 148 09/23/2020 04:27 AM    HDL Cholesterol 52 09/23/2020 04:27 AM    LDL, calculated 83.8 09/23/2020 04:27 AM    Triglyceride 61 09/23/2020 04:27 AM    CHOL/HDL Ratio 2.8 09/23/2020 04:27 AM     Lab Results   Component Value Date/Time    Glucose (POC) 120 (H) 04/24/2021 04:54 PM    Glucose (POC) 176 (H) 04/24/2021 11:16 AM    Glucose (POC) 111 (H) 04/24/2021 08:45 AM    Glucose (POC) 138 (H) 04/02/2021 11:45 AM    Glucose (POC) 107 (H) 04/01/2021 09:16 PM     Lab Results   Component Value Date/Time    Color YELLOW/STRAW 10/29/2021 07:16 PM    Appearance CLOUDY (A) 10/29/2021 07:16 PM    Specific gravity 1.009 10/29/2021 07:16 PM    pH (UA) 5.0 10/29/2021 07:16 PM    Protein 100 (A) 10/29/2021 07:16 PM    Glucose Negative 10/29/2021 07:16 PM    Ketone Negative 10/29/2021 07:16 PM    Bilirubin Negative 10/29/2021 07:16 PM    Urobilinogen 0.2 10/29/2021 07:16 PM    Nitrites Negative 10/29/2021 07:16 PM    Leukocyte Esterase MODERATE (A) 10/29/2021 07:16 PM    Epithelial cells MODERATE (A) 10/29/2021 07:16 PM    Bacteria 2+ (A) 10/29/2021 07:16 PM    WBC 20-50 10/29/2021 07:16 PM    RBC 0-5 10/29/2021 07:16 PM         Medications Reviewed:     Current Facility-Administered Medications   Medication Dose Route Frequency    ARIPiprazole (ABILIFY) tablet 20 mg  20 mg Oral DAILY    hydrALAZINE (APRESOLINE) tablet 50 mg  50 mg Oral TID    hydrALAZINE (APRESOLINE) 20 mg/mL injection 10 mg  10 mg IntraVENous Q6H PRN    furosemide (LASIX) injection 40 mg  40 mg IntraVENous Q MON, WED & SAT    sodium chloride (NS) flush 5-40 mL  5-40 mL IntraVENous Q8H    sodium chloride (NS) flush 5-40 mL  5-40 mL IntraVENous PRN    acetaminophen (TYLENOL) tablet 650 mg  650 mg Oral Q6H PRN    Or    acetaminophen (TYLENOL) suppository 650 mg  650 mg Rectal Q6H PRN    polyethylene glycol (MIRALAX) packet 17 g  17 g Oral DAILY PRN    ondansetron (ZOFRAN ODT) tablet 4 mg  4 mg Oral Q8H PRN    Or    ondansetron (ZOFRAN) injection 4 mg  4 mg IntraVENous Q6H PRN    cefTRIAXone (ROCEPHIN) 1 g in 0.9% sodium chloride 10 mL IV syringe  1 g IntraVENous Q24H    azithromycin (ZITHROMAX) 500 mg in 0.9% sodium chloride 250 mL (VIAL-MATE)  500 mg IntraVENous Q24H    apixaban (ELIQUIS) tablet 2.5 mg  2.5 mg Oral BID    amLODIPine (NORVASC) tablet 2.5 mg  2.5 mg Oral DAILY    atorvastatin (LIPITOR) tablet 40 mg  40 mg Oral QHS    busPIRone (BUSPAR) tablet 5 mg  5 mg Oral TID    carbidopa-levodopa (SINEMET)  mg per tablet 2 Tablet  2 Tablet Oral QID    carvediloL (COREG) tablet 3.125 mg  3.125 mg Oral BID WITH MEALS    DULoxetine (CYMBALTA) capsule 120 mg  120 mg Oral DAILY    gabapentin (NEURONTIN) capsule 100 mg  100 mg Oral QHS    melatonin tablet 6 mg  6 mg Oral QHS    pantoprazole (PROTONIX) tablet 40 mg  40 mg Oral ACB     ______________________________________________________________________  EXPECTED LENGTH OF STAY: - - -  ACTUAL LENGTH OF STAY:          2                 Miguel Angel Russell MD

## 2021-12-12 NOTE — PROGRESS NOTES
Occupational Therapy Note:     Attempted to see pt for evaluation but PCT in room assessed morning vitals reporting /103. Will defer OT evaluation at this time. RN notified.         Miriam Layton, OT

## 2021-12-13 LAB
FLUID CULTURE, SPNG2: NORMAL
L PNEUMO1 AG UR QL IA: NEGATIVE
ORGANISM ID, SPNG3: NORMAL
PLEASE NOTE, SPNG4: NORMAL
S PNEUM AG SPEC QL LA: NEGATIVE
SPECIMEN SOURCE: NORMAL
SPECIMEN SOURCE: NORMAL
SPECIMEN, SPNG1: NORMAL

## 2021-12-13 PROCEDURE — 65660000000 HC RM CCU STEPDOWN

## 2021-12-13 PROCEDURE — 97535 SELF CARE MNGMENT TRAINING: CPT

## 2021-12-13 PROCEDURE — 74011250637 HC RX REV CODE- 250/637: Performed by: INTERNAL MEDICINE

## 2021-12-13 PROCEDURE — 74011250636 HC RX REV CODE- 250/636: Performed by: FAMILY MEDICINE

## 2021-12-13 PROCEDURE — 74011250637 HC RX REV CODE- 250/637: Performed by: FAMILY MEDICINE

## 2021-12-13 PROCEDURE — 97530 THERAPEUTIC ACTIVITIES: CPT

## 2021-12-13 PROCEDURE — 74011250636 HC RX REV CODE- 250/636: Performed by: INTERNAL MEDICINE

## 2021-12-13 PROCEDURE — 97165 OT EVAL LOW COMPLEX 30 MIN: CPT

## 2021-12-13 PROCEDURE — 74011000250 HC RX REV CODE- 250: Performed by: FAMILY MEDICINE

## 2021-12-13 RX ADMIN — CARBIDOPA AND LEVODOPA 2 TABLET: 25; 100 TABLET ORAL at 09:25

## 2021-12-13 RX ADMIN — FUROSEMIDE 40 MG: 10 INJECTION, SOLUTION INTRAMUSCULAR; INTRAVENOUS at 11:57

## 2021-12-13 RX ADMIN — BUSPIRONE HYDROCHLORIDE 5 MG: 5 TABLET ORAL at 17:44

## 2021-12-13 RX ADMIN — PANTOPRAZOLE SODIUM 40 MG: 40 TABLET, DELAYED RELEASE ORAL at 08:17

## 2021-12-13 RX ADMIN — APIXABAN 2.5 MG: 2.5 TABLET, FILM COATED ORAL at 17:44

## 2021-12-13 RX ADMIN — GABAPENTIN 100 MG: 100 CAPSULE ORAL at 21:27

## 2021-12-13 RX ADMIN — BUSPIRONE HYDROCHLORIDE 5 MG: 5 TABLET ORAL at 21:27

## 2021-12-13 RX ADMIN — DULOXETINE 120 MG: 60 CAPSULE, DELAYED RELEASE ORAL at 09:25

## 2021-12-13 RX ADMIN — Medication 10 ML: at 14:00

## 2021-12-13 RX ADMIN — CARBIDOPA AND LEVODOPA 2 TABLET: 25; 100 TABLET ORAL at 13:13

## 2021-12-13 RX ADMIN — Medication 10 ML: at 21:32

## 2021-12-13 RX ADMIN — Medication 6 MG: at 21:27

## 2021-12-13 RX ADMIN — ARIPIPRAZOLE 20 MG: 5 TABLET ORAL at 09:31

## 2021-12-13 RX ADMIN — CARVEDILOL 3.12 MG: 3.12 TABLET, FILM COATED ORAL at 08:17

## 2021-12-13 RX ADMIN — AMLODIPINE BESYLATE 2.5 MG: 5 TABLET ORAL at 09:25

## 2021-12-13 RX ADMIN — APIXABAN 2.5 MG: 2.5 TABLET, FILM COATED ORAL at 09:25

## 2021-12-13 RX ADMIN — SODIUM CHLORIDE 1 G: 9 INJECTION INTRAMUSCULAR; INTRAVENOUS; SUBCUTANEOUS at 05:30

## 2021-12-13 RX ADMIN — CARVEDILOL 3.12 MG: 3.12 TABLET, FILM COATED ORAL at 17:45

## 2021-12-13 RX ADMIN — ATORVASTATIN CALCIUM 40 MG: 40 TABLET, FILM COATED ORAL at 21:27

## 2021-12-13 RX ADMIN — HYDRALAZINE HYDROCHLORIDE 50 MG: 50 TABLET, FILM COATED ORAL at 09:25

## 2021-12-13 RX ADMIN — CARBIDOPA AND LEVODOPA 2 TABLET: 25; 100 TABLET ORAL at 17:44

## 2021-12-13 RX ADMIN — HYDRALAZINE HYDROCHLORIDE 50 MG: 50 TABLET, FILM COATED ORAL at 17:45

## 2021-12-13 RX ADMIN — CARBIDOPA AND LEVODOPA 2 TABLET: 25; 100 TABLET ORAL at 21:27

## 2021-12-13 RX ADMIN — BUSPIRONE HYDROCHLORIDE 5 MG: 5 TABLET ORAL at 09:25

## 2021-12-13 RX ADMIN — AZITHROMYCIN MONOHYDRATE 500 MG: 500 INJECTION, POWDER, LYOPHILIZED, FOR SOLUTION INTRAVENOUS at 05:30

## 2021-12-13 NOTE — PROGRESS NOTES
Problem: Mobility Impaired (Adult and Pediatric)  Goal: *Acute Goals and Plan of Care (Insert Text)  Description: FUNCTIONAL STATUS PRIOR TO ADMISSION: The patient was functional at the wheelchair level and was modified independent for transfers to the wheelchair per pt report. Pt denies any falls in the last year. HOME SUPPORT PRIOR TO ADMISSION: The patient lived in [facility] due to her apartment lease ending in November 2021 and is looking for new living arrangements. Pt reports she has a niece in the area that assist her. Physical Therapy Goals  Initiated 12/11/2021  1. Patient will move from supine to sit and sit to supine  in bed with moderate assistance  within 7 day(s). 2.  Patient will transfer from bed to chair and chair to bed with moderate assistance  using the least restrictive device within 7 day(s). 3.  Patient will perform sit to stand with moderate assistance  within 7 day(s). Outcome: Progressing Towards Goal   PHYSICAL THERAPY TREATMENT  Patient: Priscila Diaz (60 y.o. female)  Date: 12/13/2021  Diagnosis: Acute on chronic respiratory failure with hypoxia (HCC) [J96.21]  Elevated troponin [R77.8]  CAP (community acquired pneumonia) [J18.9]   <principal problem not specified>       Precautions: Contact  Chart, physical therapy assessment, plan of care and goals were reviewed. ASSESSMENT  Patient continues with skilled PT services and is slowly progressing towards goals. Pt initially reluctant to participate but agreeable to mobilize with encouragement. Pt demonstrates improving mobility requiring less assistance overall for bed mobility, transfers, and progressed with bed to chair transfer. Pt required 2 person minimal assistance for all aspects of mobility and able to initiate shuffling side steps to bedside chair. SPO2 stable on RA at 92% following transfer to bedside chair. Pt remained up in chair with alarm activated at session end.      Current Level of Function Impacting Discharge (mobility/balance): minimal assist x 2 for all mobility, bed to chair transfer with rolling walker     Other factors to consider for discharge: below her stated baseline level of function, high fall risk         PLAN :  Patient continues to benefit from skilled intervention to address the above impairments. Continue treatment per established plan of care. to address goals. Recommendation for discharge: (in order for the patient to meet his/her long term goals)  Therapy up to 5 days/week in SNF setting    This discharge recommendation:  A follow-up discussion with the attending provider and/or case management is planned    IF patient discharges home will need the following DME: patient owns DME required for discharge       SUBJECTIVE:   Patient stated I don't think I can get up now.     OBJECTIVE DATA SUMMARY:   Critical Behavior:  Neurologic State: Alert  Orientation Level: Oriented to person, Oriented to place, Oriented to situation, Disoriented to time  Cognition: Follows commands  Safety/Judgement: Awareness of environment  Functional Mobility Training:  Bed Mobility:     Supine to Sit: Minimum assistance; Assist x2              Transfers:  Sit to Stand: Minimum assistance; Assist x2  Stand to Sit: Minimum assistance; Assist x2        Bed to Chair: Minimum assistance; Assist x2 with rolling walker for support                    Balance:  Sitting: Intact  Standing: Impaired; With support          Pain Rating:  No complaints     Activity Tolerance:   Fair, SPO2 on RA 92% following transfer to chair    After treatment patient left in no apparent distress:   Sitting in chair, Call bell within reach, and Bed / chair alarm activated    COMMUNICATION/COLLABORATION:   The patients plan of care was discussed with: Registered nurse.      Shadia Cifuentes   Time Calculation: 17 mins

## 2021-12-13 NOTE — PROGRESS NOTES
Transition of Care Plan   RUR- 24%     DISPOSITION: The disposition plan is [facility]; pending medical progression  o Insurance [facility]#[id number]; Next Review Date: 12/13-12/15    F/U with PCP/Specialist     Transport:  CATHLEEN          CM: [address] [place] [address] [facility] LADI,   [phone]

## 2021-12-13 NOTE — PROGRESS NOTES
Problem: Self Care Deficits Care Plan (Adult)  Goal: *Acute Goals and Plan of Care (Insert Text)  Description: Description: FUNCTIONAL STATUS PRIOR TO ADMISSION: The patient was functional at the wheelchair level and was modified independent for transfers to the wheelchair per pt report. Pt denies any falls in the last year. HOME SUPPORT PRIOR TO ADMISSION: The patient lived in [facility] due to her apartment lease ending in November 2021 and is looking for new living arrangements. Pt reports she has a niece in the area that assist her. Occupational Therapy Goals  Initiated 12/13/2021  1. Patient will perform bathing with minimal assistance/contact guard assist within 7 day(s). 2.  Patient will perform lower body dressing with moderate assistance  within 7 day(s). 3.  Patient will perform upper body dressing with supervision/set-up within 7 day(s). 4.  Patient will perform toilet transfers with minimal assistance/contact guard assist within 7 day(s). 5.  Patient will perform all aspects of toileting with minimal assistance/contact guard assist within 7 day(s). 6.  Patient will participate in upper extremity therapeutic exercise/activities with minimal assistance/contact guard assist for 15 minutes within 7 day(s). 7.  Patient will utilize energy conservation techniques during functional activities with verbal cues within 7 day(s). 12/13/2021 1255 by Juanis Nolan OTR/L  Outcome: Progressing Towards Goal  OCCUPATIONAL THERAPY EVALUATION  Patient: Priscila Diaz (55 y.o. female)  Date: 12/13/2021  Primary Diagnosis: Acute on chronic respiratory failure with hypoxia (HCC) [J96.21]  Elevated troponin [R77.8]  CAP (community acquired pneumonia) [J18.9]        Precautions:   Contact    ASSESSMENT  Based on the objective data described below, the patient presents with decrease mobility, generalized weakness, decrease activity tolerance and decrease independence with self-care tasks.   Per chart, pt was mod I with functional transfers from w/c. Today, pt presented at min assist x 2 using RW, mod to max assist with LB self-care, and set-up with grooming  Pt is below baseline and would benefit from further therapy at [facility]. Will con't to follow and address listed goals. Current Level of Function Impacting Discharge (ADLs/self-care): see above    Other factors to consider for discharge: none noted     Patient will benefit from skilled therapy intervention to address the above noted impairments. PLAN :  Recommendations and Planned Interventions: self care training, functional mobility training, therapeutic exercise, therapeutic activities, endurance activities, patient education, and home safety training    Frequency/Duration: Patient will be followed by occupational therapy 4 times a week to address goals.     Recommendation for discharge: (in order for the patient to meet his/her long term goals)  Therapy up to 5 days/week in SNF setting    This discharge recommendation:  A follow-up discussion with the attending provider and/or case management is planned    IF patient discharges home will need the following DME: none       SUBJECTIVE:   Patient stated I'm at [facility].    OBJECTIVE DATA SUMMARY:   HISTORY:   Past Medical History:   Diagnosis Date    Anxiety disorder     Atrial fibrillation ([facility] [address]     CAD (coronary artery disease) 2007    stents, CABG x 3v    Carotid stenosis     Cervical stenosis of spinal canal 07/2019    Chronic kidney disease     Cough     CVA (cerebral vascular accident) ([address] 07/2019    left lacunar infarct at head of caudate    Depression     AND CHRONIC ANXIETY    Diabetes ([facility] [address]     GERD (gastroesophageal reflux disease)     High cholesterol     History of peptic ulcer     Bleeding ulcer with increased NSAID use    Hypertension     Left carotid stenosis 07/2019    s/p left CEA with Dr. Estrellita Phelan    MI, old 2007    PUD (peptic ulcer disease)     Stroke [facility])     Tremor     Valvular heart disease      Past Surgical History:   Procedure Laterality Date    HX CAROTID ENDARTERECTOMY  07/2019    HX CORONARY ARTERY BYPASS GRAFT      HX TONSILLECTOMY  1963    IN CARDIAC SURG PROCEDURE UNLIST      CABG X3 VESSEL    IN TOTAL KNEE ARTHROPLASTY Right 2015       Expanded or extensive additional review of patient history:     Home Situation  Home Environment: Long term care  Care Facility Name: [facility]  # Steps to Enter: 0  One/Two Story Residence: One story  Living Alone: No  Support Systems: [place], Other Family Member(s) (niece lives locally)  Patient Expects to be Discharged to[de-identified] Skilled nursing facility  Current DME Used/Available at Home: Elissa Favia, rolling, Wheelchair  Tub or Shower Type: Shower      EXAMINATION OF PERFORMANCE DEFICITS:  Cognitive/Behavioral Status:  Neurologic State: Alert  Orientation Level: Oriented to person; Oriented to place; Oriented to situation; Disoriented to time  Cognition: Follows commands        Safety/Judgement: Awareness of environment    Skin: intact    Edema: none noted    Hearing:       Vision/Perceptual:                                     Range of Motion:    AROM: Generally decreased, functional                         Strength:    Strength: Generally decreased, functional                Coordination:  Coordination: Within functional limits  Fine Motor Skills-Upper: Left Intact; Right Intact    Gross Motor Skills-Upper: Left Intact; Right Intact    Tone & Sensation:    Tone: Normal  Sensation: Intact                      Balance:  Sitting: Intact  Standing: Impaired;  With support    Functional Mobility and Transfers for ADLs:  Bed Mobility:  Supine to Sit: Minimum assistance; Assist x2    Transfers:  Sit to Stand: Minimum assistance; Assist x2  Stand to Sit: Minimum assistance; Assist x2  Bed to Chair: Minimum assistance; Assist x2  Toilet Transfer : Minimum assistance; Assist x2    ADL Assessment:  Feeding: Setup    Oral Facial Hygiene/Grooming: Setup    Bathing: Moderate assistance; Maximum assistance    Upper Body Dressing: Minimum assistance    Lower Body Dressing: Maximum assistance    Toileting: Moderate assistance                ADL Intervention and task modifications:            Cognitive Retraining  Safety/Judgement: Awareness of environment        Occupational Therapy Evaluation Charge Determination   History Examination Decision-Making   LOW Complexity : Brief history review  MEDIUM Complexity : 3-5 performance deficits relating to physical, cognitive , or psychosocial skils that result in activity limitations and / or participation restrictions MEDIUM Complexity : Patient may present with comorbidities that affect occupational performnce. Miniml to moderate modification of tasks or assistance (eg, physical or verbal ) with assesment(s) is necessary to enable patient to complete evaluation       Based on the above components, the patient evaluation is determined to be of the following complexity level: LOW   Pain Rating:  No c/o pain    Activity Tolerance:   Fair    After treatment patient left in no apparent distress:    Sitting in chair, Call bell within reach, and Bed / chair alarm activated    COMMUNICATION/EDUCATION:   The patients plan of care was discussed with: Physical therapist and Registered nurse. Home safety education was provided and the patient/caregiver indicated understanding., Patient/family have participated as able in goal setting and plan of care. , and Patient/family agree to work toward stated goals and plan of care. This patients plan of care is appropriate for delegation to [facility].     Thank you for this referral.  Ann Marie Simons OTR/L  Time Calculation: 23 mins

## 2021-12-13 NOTE — PROGRESS NOTES
Hospitalist Progress Note          Mason Sarabia MD  Please call  and page for questions. Call physician on-call through the  7pm-7am    Daily Progress Note: 12/13/2021    Primary care provider:Jordon Arce DO    Date of admission: 12/10/2021  3:28 AM    Admission summery and hospital course:  HPI: [de-identified] y.o. 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 Per record, she was picked up from [facility] where she was found to be hypoxic to the fifties on her normal 2 L nasal cannula.    She was recently admitted from 10/29 to 11/3 for sepsis 2/2 proteus UTI.   In the ED, SBP was elevated to one nineties. Per record, SPO2 is 100% on BiPAP. Labs were significant for WBC 15.7, creatinine 1.94, CO2 20, troponin 2,017, and probnp 18,236. Rapid covid is negative. Subjective:   Patient patient states she is little better than yesterday. Assessment/Plan:   Acute on Chronic Hypoxic Respiratory Failure  Pulmonary edema  Left lower lobe airspace disease with effusion  Leukocytosis is improving and patient is afebrile. Continue supplemental oxygen. Patient received Lasix 80 mg IV on admission. Continue to monitor I&O, daily weight. Continue azithromycin and ceftriaxone. Follow cultures, strep pneumonia antigen was negative. CRP was 0.82, rapid Covid 19 test was negative, Legionella pneumonia antigen was negative RSV by PCR was negative and influenza A is negative.       Hypertensive Emergency  Elevated Troponin  Asymptomatic, EKG with no hyperacute changes. Likely demand from htn, pna, and HF.   Troponin is trending down towards normal.  Blood pressure is fragile, continue home carvedilol, hydralazine and Norvasc. Hydralazine IV as necessary.     Dementia and mood disorder  Patient knows she is at the hospital, she is oriented to her , month and year. Probably at the baseline. Continue outpatient melatonin,  cymbalta, and buspar.        Parkinsons: continue home sinemet.   Gastroesophageal reflux disease: continue pantoprazole.     Paroxysmal atrial fibrillation; Rate controlled with carvedilol, continue apixaban. Anemia, chronic; monitor CBC. See orders for other plans. VTE prophylaxis: Patient is not apixaban. Code status: DNR  Discussed plan of care with Patient/Family and Nurse. MPOA: Marilu Alonso listed as relative  Pre-admission lived at home. Discharge planning: pending. Follow PT/OT       Review of Systems:     Review of Systems:  Symptom  Y/N  Comments   Symptom  Y/N  Comments    Fever/Chills  n    Chest Pain  n    Poor Appetite  n    Edema  n     Cough  y   Abdominal Pain  n     Sputum  y   Joint Pain      SOB/SALAMANCA  n   Pruritis/Rash      Nausea/vomit  n   Tolerating PT/OT      Diarrhea  n   Tolerating Diet      Constipation     Other      Could not obtain due to:         Objective:   Physical Exam:     Visit Vitals  BP (!) 171/64   Pulse 66   Temp 98.5 °F (36.9 °C)   Resp 18   Wt 76.5 kg (168 lb 10.4 oz)   SpO2 94%   BMI 28.07 kg/m²    O2 Flow Rate (L/min): 2 l/min O2 Device: Nasal cannula    Temp (24hrs), Av.6 °F (37 °C), Min:98.4 °F (36.9 °C), Max:99.1 °F (37.3 °C)    No intake/output data recorded.  1901 -  0700  In: -   Out: 1280 [Urine:1280]      General:  Alert, cooperative, no distress, appears stated age. Patient is comfortably laying in the bed. Lungs:    Bronchial sounds at left lower aspect, otherwise clear to auscultation bilaterally. Chest wall:  No tenderness or deformity. Heart:  Regular rate and rhythm, S1, S2 normal, no murmur, click, rub or gallop. Abdomen:   Soft, non-tender.  Bowel sounds normal. No masses, No organomegaly. Extremities: Extremities normal, atraumatic, no cyanosis or edema. Pulses: 2+ and symmetric all extremities. Neurologic:  Patient has clear voice. She follows command. Data Review:       Recent Days:  Recent Labs     12/12/21 0443   WBC 6.9   HGB 8.0*   HCT 25.1*        Recent Labs     12/12/21 0443      K 4.4      CO2 23   *   BUN 30*   CREA 1.77*   CA 8.7   ALB 3.4*   ALT 9*     No results for input(s): PH, PCO2, PO2, HCO3, FIO2 in the last 72 hours. 24 Hour Results:  No results found for this or any previous visit (from the past 24 hour(s)). Problem List:  Problem List as of 12/13/2021 Date Reviewed: 8/26/2021          Codes Class Noted - Resolved    CAP (community acquired pneumonia) ICD-10-CM: J18.9  ICD-9-CM: 486  12/10/2021 - Present        Acute on chronic respiratory failure with hypoxia ([facility] ICD-10-CM: J96.21  ICD-9-CM: 518.84, 799.02  12/10/2021 - Present        Cellulitis and abscess of lower extremity ICD-10-CM: L03.119, L02.419  ICD-9-CM: 682.6  10/29/2021 - Present        SOB (shortness of breath) ICD-10-CM: R06.02  ICD-9-CM: 786.05  4/23/2021 - Present        UTI (urinary tract infection) ICD-10-CM: N39.0  ICD-9-CM: 599.0  4/9/2021 - Present        CHF exacerbation ([facility] ICD-10-CM: I50.9  ICD-9-CM: 428.0  9/25/2020 - Present        Weakness ICD-10-CM: R53.1  ICD-9-CM: 780.79  5/4/2020 - Present        Generalized weakness ICD-10-CM: R53.1  ICD-9-CM: 780.79  4/30/2020 - Present        Carotid artery stenosis, symptomatic, left ICD-10-CM: F04.25  ICD-9-CM: 433.10  7/26/2019 - Present        HTN (hypertension) ICD-10-CM: I10  ICD-9-CM: 401.9  7/17/2019 - Present        Acute ischemic stroke ([facility] ICD-10-CM: I63.9  ICD-9-CM: 434.91  7/12/2019 - Present        Fall ICD-10-CM: W19. Velvet Sour  ICD-9-CM: B824.6  12/24/2018 - Present        CKD (chronic kidney disease), stage III ([facility] ICD-10-CM: N18.30  ICD-9-CM: 585.3  7/8/2015 - Present Edema ICD-10-CM: R60.9  ICD-9-CM: 782.3  5/6/2015 - Present        Diabetes mellitus ([facility] ICD-10-CM: E11.9  ICD-9-CM: 250.00  5/6/2015 - Present        Postoperative anemia due to acute blood loss ICD-10-CM: D62  ICD-9-CM: 285.1  2/14/2015 - Present        S/P CABG (coronary artery bypass graft) ICD-10-CM: Z95.1  ICD-9-CM: V45.81  2/13/2015 - Present        Syncope ICD-10-CM: R55  ICD-9-CM: 780.2  2/9/2015 - Present        Bradycardia ICD-10-CM: R00.1  ICD-9-CM: 427.89  2/9/2015 - Present        Acute kidney injury ([facility] ICD-10-CM: N17.9  ICD-9-CM: 584.9  2/9/2015 - Present        Anemia ICD-10-CM: D64.9  ICD-9-CM: 285.9  9/9/2014 - Present        GI bleed ICD-10-CM: K92.2  ICD-9-CM: 578.9  9/9/2014 - Present        AF (atrial fibrillation) (HCC) ICD-10-CM: I48.91  ICD-9-CM: 427.31  6/20/2014 - Present        Hypotension ICD-10-CM: I95.9  ICD-9-CM: 458.9  6/3/2014 - Present        Coronary artery disease ICD-10-CM: I25.10  ICD-9-CM: 414.00  6/3/2014 - Present    Overview Signed 2/10/2015 11:06 AM by Sania Delaney     2007 PCI x2 to LAD with STEPHAN             S/P coronary artery stent placement ICD-10-CM: Z95.5  ICD-9-CM: V45.82  6/3/2014 - Present        Renal failure ICD-10-CM: N19  ICD-9-CM: 941  6/3/2014 - Present        Elevated troponin ICD-10-CM: R77.8  ICD-9-CM: 790.6  6/3/2014 - Present        Pericardial effusion ICD-10-CM: I31.3  ICD-9-CM: 423.9  6/3/2014 - Present        Lactic acidosis ICD-10-CM: E87.2  ICD-9-CM: 276.2  6/3/2014 - Present        RESOLVED: CHF (congestive heart failure) (HCC) ICD-10-CM: I50.9  ICD-9-CM: 428.0  9/22/2020 - 8/3/2021              Medications reviewed  Current Facility-Administered Medications   Medication Dose Route Frequency    ARIPiprazole (ABILIFY) tablet 20 mg  20 mg Oral DAILY    hydrALAZINE (APRESOLINE) tablet 50 mg  50 mg Oral TID    hydrALAZINE (APRESOLINE) 20 mg/mL injection 10 mg  10 mg IntraVENous Q6H PRN    furosemide (LASIX) injection 40 mg  40 mg IntraVENous Q MON, WED & SAT    sodium chloride (NS) flush 5-40 mL  5-40 mL IntraVENous Q8H    sodium chloride (NS) flush 5-40 mL  5-40 mL IntraVENous PRN    acetaminophen (TYLENOL) tablet 650 mg  650 mg Oral Q6H PRN    Or    acetaminophen (TYLENOL) suppository 650 mg  650 mg Rectal Q6H PRN    polyethylene glycol (MIRALAX) packet 17 g  17 g Oral DAILY PRN    ondansetron (ZOFRAN ODT) tablet 4 mg  4 mg Oral Q8H PRN    Or    ondansetron (ZOFRAN) injection 4 mg  4 mg IntraVENous Q6H PRN    cefTRIAXone (ROCEPHIN) 1 g in 0.9% sodium chloride 10 mL IV syringe  1 g IntraVENous Q24H    azithromycin (ZITHROMAX) 500 mg in 0.9% sodium chloride 250 mL (VIAL-MATE)  500 mg IntraVENous Q24H    apixaban (ELIQUIS) tablet 2.5 mg  2.5 mg Oral BID    amLODIPine (NORVASC) tablet 2.5 mg  2.5 mg Oral DAILY    atorvastatin (LIPITOR) tablet 40 mg  40 mg Oral QHS    busPIRone (BUSPAR) tablet 5 mg  5 mg Oral TID    carbidopa-levodopa (SINEMET)  mg per tablet 2 Tablet  2 Tablet Oral QID    carvediloL (COREG) tablet 3.125 mg  3.125 mg Oral BID WITH MEALS    DULoxetine (CYMBALTA) capsule 120 mg  120 mg Oral DAILY    gabapentin (NEURONTIN) capsule 100 mg  100 mg Oral QHS    melatonin tablet 6 mg  6 mg Oral QHS    pantoprazole (PROTONIX) tablet 40 mg  40 mg Oral ACB       Care Plan discussed with: Patient/Family and Nurse    Total time spent with patient: 40 minutes.     Genesis Herrera MD

## 2021-12-14 LAB
ALBUMIN SERPL-MCNC: 3 G/DL (ref 3.5–5)
ALBUMIN/GLOB SERPL: 1.3 {RATIO} (ref 1.1–2.2)
ALP SERPL-CCNC: 81 U/L (ref 45–117)
ALT SERPL-CCNC: 8 U/L (ref 12–78)
ANION GAP SERPL CALC-SCNC: 10 MMOL/L (ref 5–15)
AST SERPL-CCNC: 14 U/L (ref 15–37)
BASOPHILS # BLD: 0 K/UL (ref 0–0.1)
BASOPHILS NFR BLD: 1 % (ref 0–1)
BILIRUB SERPL-MCNC: 0.4 MG/DL (ref 0.2–1)
BUN SERPL-MCNC: 39 MG/DL (ref 6–20)
BUN/CREAT SERPL: 25 (ref 12–20)
CALCIUM SERPL-MCNC: 8.2 MG/DL (ref 8.5–10.1)
CHLORIDE SERPL-SCNC: 107 MMOL/L (ref 97–108)
CO2 SERPL-SCNC: 22 MMOL/L (ref 21–32)
CREAT SERPL-MCNC: 1.55 MG/DL (ref 0.55–1.02)
DIFFERENTIAL METHOD BLD: ABNORMAL
EOSINOPHIL # BLD: 0.3 K/UL (ref 0–0.4)
EOSINOPHIL NFR BLD: 5 % (ref 0–7)
ERYTHROCYTE [DISTWIDTH] IN BLOOD BY AUTOMATED COUNT: 14.6 % (ref 11.5–14.5)
FERRITIN SERPL-MCNC: 156 NG/ML (ref 8–252)
FOLATE SERPL-MCNC: 7.8 NG/ML (ref 5–21)
GLOBULIN SER CALC-MCNC: 2.3 G/DL (ref 2–4)
GLUCOSE SERPL-MCNC: 106 MG/DL (ref 65–100)
HCT VFR BLD AUTO: 23.2 % (ref 35–47)
HGB BLD-MCNC: 7.4 G/DL (ref 11.5–16)
IMM GRANULOCYTES # BLD AUTO: 0 K/UL (ref 0–0.04)
IMM GRANULOCYTES NFR BLD AUTO: 0 % (ref 0–0.5)
IRON SATN MFR SERPL: 18 % (ref 20–50)
IRON SERPL-MCNC: 37 UG/DL (ref 35–150)
LYMPHOCYTES # BLD: 0.8 K/UL (ref 0.8–3.5)
LYMPHOCYTES NFR BLD: 15 % (ref 12–49)
MCH RBC QN AUTO: 31.9 PG (ref 26–34)
MCHC RBC AUTO-ENTMCNC: 31.9 G/DL (ref 30–36.5)
MCV RBC AUTO: 100 FL (ref 80–99)
MONOCYTES # BLD: 0.6 K/UL (ref 0–1)
MONOCYTES NFR BLD: 10 % (ref 5–13)
NEUTS SEG # BLD: 3.8 K/UL (ref 1.8–8)
NEUTS SEG NFR BLD: 69 % (ref 32–75)
NRBC # BLD: 0 K/UL (ref 0–0.01)
NRBC BLD-RTO: 0 PER 100 WBC
PLATELET # BLD AUTO: 244 K/UL (ref 150–400)
PMV BLD AUTO: 8.9 FL (ref 8.9–12.9)
POTASSIUM SERPL-SCNC: 4.1 MMOL/L (ref 3.5–5.1)
PROT SERPL-MCNC: 5.3 G/DL (ref 6.4–8.2)
RBC # BLD AUTO: 2.32 M/UL (ref 3.8–5.2)
SODIUM SERPL-SCNC: 139 MMOL/L (ref 136–145)
TIBC SERPL-MCNC: 208 UG/DL (ref 250–450)
WBC # BLD AUTO: 5.5 K/UL (ref 3.6–11)

## 2021-12-14 PROCEDURE — 85025 COMPLETE CBC W/AUTO DIFF WBC: CPT

## 2021-12-14 PROCEDURE — 80053 COMPREHEN METABOLIC PANEL: CPT

## 2021-12-14 PROCEDURE — 82746 ASSAY OF FOLIC ACID SERUM: CPT

## 2021-12-14 PROCEDURE — 74011000250 HC RX REV CODE- 250: Performed by: FAMILY MEDICINE

## 2021-12-14 PROCEDURE — 77030038269 HC DRN EXT URIN PURWCK BARD -A

## 2021-12-14 PROCEDURE — 83540 ASSAY OF IRON: CPT

## 2021-12-14 PROCEDURE — 74011250637 HC RX REV CODE- 250/637: Performed by: FAMILY MEDICINE

## 2021-12-14 PROCEDURE — 65660000000 HC RM CCU STEPDOWN

## 2021-12-14 PROCEDURE — 82728 ASSAY OF FERRITIN: CPT

## 2021-12-14 PROCEDURE — 74011250636 HC RX REV CODE- 250/636: Performed by: FAMILY MEDICINE

## 2021-12-14 PROCEDURE — 74011250637 HC RX REV CODE- 250/637: Performed by: INTERNAL MEDICINE

## 2021-12-14 PROCEDURE — 36415 COLL VENOUS BLD VENIPUNCTURE: CPT

## 2021-12-14 RX ADMIN — PANTOPRAZOLE SODIUM 40 MG: 40 TABLET, DELAYED RELEASE ORAL at 06:03

## 2021-12-14 RX ADMIN — CARBIDOPA AND LEVODOPA 2 TABLET: 25; 100 TABLET ORAL at 08:55

## 2021-12-14 RX ADMIN — ARIPIPRAZOLE 20 MG: 5 TABLET ORAL at 09:00

## 2021-12-14 RX ADMIN — HYDRALAZINE HYDROCHLORIDE 50 MG: 50 TABLET, FILM COATED ORAL at 08:54

## 2021-12-14 RX ADMIN — CARVEDILOL 3.12 MG: 3.12 TABLET, FILM COATED ORAL at 18:17

## 2021-12-14 RX ADMIN — Medication 6 MG: at 21:15

## 2021-12-14 RX ADMIN — BUSPIRONE HYDROCHLORIDE 5 MG: 5 TABLET ORAL at 18:17

## 2021-12-14 RX ADMIN — AMLODIPINE BESYLATE 2.5 MG: 5 TABLET ORAL at 08:55

## 2021-12-14 RX ADMIN — AZITHROMYCIN MONOHYDRATE 500 MG: 500 INJECTION, POWDER, LYOPHILIZED, FOR SOLUTION INTRAVENOUS at 04:30

## 2021-12-14 RX ADMIN — Medication 10 ML: at 04:35

## 2021-12-14 RX ADMIN — Medication 10 ML: at 21:15

## 2021-12-14 RX ADMIN — BUSPIRONE HYDROCHLORIDE 5 MG: 5 TABLET ORAL at 21:15

## 2021-12-14 RX ADMIN — ATORVASTATIN CALCIUM 40 MG: 40 TABLET, FILM COATED ORAL at 21:15

## 2021-12-14 RX ADMIN — HYDRALAZINE HYDROCHLORIDE 50 MG: 50 TABLET, FILM COATED ORAL at 18:17

## 2021-12-14 RX ADMIN — SODIUM CHLORIDE 1 G: 9 INJECTION INTRAMUSCULAR; INTRAVENOUS; SUBCUTANEOUS at 06:02

## 2021-12-14 RX ADMIN — GABAPENTIN 100 MG: 100 CAPSULE ORAL at 21:15

## 2021-12-14 RX ADMIN — CARBIDOPA AND LEVODOPA 2 TABLET: 25; 100 TABLET ORAL at 21:15

## 2021-12-14 RX ADMIN — CARBIDOPA AND LEVODOPA 2 TABLET: 25; 100 TABLET ORAL at 12:04

## 2021-12-14 RX ADMIN — BUSPIRONE HYDROCHLORIDE 5 MG: 5 TABLET ORAL at 08:55

## 2021-12-14 RX ADMIN — APIXABAN 2.5 MG: 2.5 TABLET, FILM COATED ORAL at 08:54

## 2021-12-14 RX ADMIN — DULOXETINE 120 MG: 60 CAPSULE, DELAYED RELEASE ORAL at 08:54

## 2021-12-14 RX ADMIN — CARVEDILOL 3.12 MG: 3.12 TABLET, FILM COATED ORAL at 08:54

## 2021-12-14 RX ADMIN — APIXABAN 2.5 MG: 2.5 TABLET, FILM COATED ORAL at 18:17

## 2021-12-14 RX ADMIN — CARBIDOPA AND LEVODOPA 2 TABLET: 25; 100 TABLET ORAL at 18:17

## 2021-12-14 NOTE — WOUND CARE
WOCN Note:     New consult for peg site. Chart shows:  Admitted for respiratory failure, pulmonary edema, elevated troponin  History of dementia, Parkinsons    Assessment:   Communicative and reports no pain  She assists in repositioning onto left side. Cleaned of small soft BM. Pure Wick in use. Surface: karina gel mattress    Bilateral heels buttocks, and sacrum intact and without erythema. Heels offloaded with pillows. No skin issues noted. PI Prevention:  Turn/reposition approximately every 2 hours  Offload heels with heels hanging off end of pillow at all times while in bed. Incont cream with incont care. No wound care needs - will sign off.      MANDY OvertonN, RN, [facility]  Certified Wound, Ostomy, Continence Nurse  office [phone]  Available via [employer]

## 2021-12-14 NOTE — PROGRESS NOTES
Problem: Falls - Risk of  Goal: *Absence of Falls  Description: Document Leafy Poe Fall Risk and appropriate interventions in the flowsheet. Outcome: Progressing Towards Goal  Note: Fall Risk Interventions:  Mobility Interventions: Bed/chair exit alarm, Communicate number of staff needed for ambulation/transfer, Patient to call before getting OOB, PT Consult for assist device competence, PT Consult for mobility concerns, Utilize walker, cane, or other assistive device, Utilize gait belt for transfers/ambulation    Mentation Interventions: Adequate sleep, hydration, pain control, Bed/chair exit alarm, Door open when patient unattended    Medication Interventions: Bed/chair exit alarm, Patient to call before getting OOB, Teach patient to arise slowly, Utilize gait belt for transfers/ambulation    Elimination Interventions: Bed/chair exit alarm, Call light in reach, Patient to call for help with toileting needs              Problem: Patient Education: Go to Patient Education Activity  Goal: Patient/Family Education  Outcome: Progressing Towards Goal     Problem: Pressure Injury - Risk of  Goal: *Prevention of pressure injury  Description: Document Gregorio Scale and appropriate interventions in the flowsheet.   Outcome: Progressing Towards Goal  Note: Pressure Injury Interventions:  Sensory Interventions: Assess changes in LOC, Keep linens dry and wrinkle-free    Moisture Interventions: Check for incontinence Q2 hours and as needed, Internal/External urinary devices, Limit adult briefs, Absorbent underpads    Activity Interventions: Pressure redistribution bed/mattress(bed type), Increase time out of bed, PT/OT evaluation    Mobility Interventions: Pressure redistribution bed/mattress (bed type), HOB 30 degrees or less    Nutrition Interventions: Offer support with meals,snacks and hydration    Friction and Shear Interventions: Transfer aides:transfer board/Sebastien lift/ceiling lift, HOB 30 degrees or less, Lift sheet Problem: Patient Education: Go to Patient Education Activity  Goal: Patient/Family Education  Outcome: Progressing Towards Goal

## 2021-12-14 NOTE — PROGRESS NOTES
Hospitalist Progress Note          Milo Francis MD  Please call  and page for questions. Call physician on-call through the  7pm-7am    Daily Progress Note: 12/14/2021    Primary care provider:Matthew Arce DO    Date of admission: 12/10/2021  3:28 AM    Admission summery and hospital course:  HPI: [de-identified] y.o. 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   Per record, she was [facility] where she was found to be hypoxic to the fifties on her normal 2 L nasal cannula.    She was recently admitted from 10/29 to 11/3 for sepsis 2/2 proteus UTI.   In the ED, SBP was elevated to one nineties.  Per record, SPO2 is 100% on BiPAP.  Labs were significant for WBC 15.7, creatinine 1.94, CO2 20, troponin 2,017, and probnp 18,236.  Rapid covid is negative. Subjective:   Patient said she is doing fine. She said she had bowel movement yesterday which was brown in color. She denies any acute issues today. Assessment/Plan:   Acute on Chronic Hypoxic Respiratory Failure  Pulmonary edema  Left lower lobe airspace disease with effusion  Leukocytosis is improving and patient is afebrile. Continue supplemental oxygen. Patient received Lasix 80 mg IV on admission. Continue to monitor I&O, daily weight. Continue azithromycin and ceftriaxone. Follow cultures, strep pneumonia antigen was negative. CRP was 0.82, rapid Covid 19 test was negative, Legionella pneumonia antigen was negative RSV by PCR was negative and influenza A is negative.       Anemia,  acute on chronic  History of iron deficiency anemia. hemoglobin 7.4 this morning her baseline hemoglobin is around 9.     Will check Hemoccult, iron panel, ferritin, and repeat CBC at a.m. Will consult GI.     Hypertensive Emergency  Elevated Troponin  Asymptomatic, EKG with no hyperacute changes. Likely demand from htn, pna, and HF. Troponin is trending down towards normal.  Blood pressure is fragile, continue home carvedilol, hydralazine and Norvasc. Hydralazine IV as necessary.     Dementia and mood disorder  Patient knows she is at the hospital, she is oriented to her , month and year. Probably at the baseline. Continue outpatient melatonin,  cymbalta, and buspar.     Parkinsons: continue home sinemet.   Gastroesophageal reflux disease: continue pantoprazole.     Paroxysmal atrial fibrillation; Rate controlled with carvedilol, continue apixaban.        See orders for other plans. VTE prophylaxis: Patient is not apixaban. Code status: DNR  Discussed plan of care with Patient/Family and Nurse. MPOA: Renetta Rodriguez listed as relative  Pre-admission lived at home. Discharge planning: Possibly in 1 or 2 days. Review of Systems:     Review of Systems:  Symptom  Y/N  Comments   Symptom  Y/N  Comments    Fever/Chills      Chest Pain      Poor Appetite      Edema       Cough     Abdominal Pain       Sputum     Joint Pain      SOB/SALAMANCA     Pruritis/Rash      Nausea/vomit     Tolerating PT/OT      Diarrhea     Tolerating Diet      Constipation     Other      Could not obtain due to:         Objective:   Physical Exam:     Visit Vitals  BP (!) 162/74   Pulse 69   Temp 97.6 °F (36.4 °C)   Resp 16   Wt 76 kg (167 lb 8.8 oz)   SpO2 90%   BMI 27.88 kg/m²    O2 Flow Rate (L/min): 2 l/min O2 Device: Nasal cannula    Temp (24hrs), Av.9 °F (36.6 °C), Min:97.4 °F (36.3 °C), Max:98.5 °F (36.9 °C)    No intake/output data recorded.  1901 -  0700  In: -   Out: 480 [Urine:480]    General:  Alert, cooperative, no distress, appears stated age. Patient is comfortably laying in the bed.    Lungs:    Bronchial sounds at left lower aspect, otherwise clear to auscultation bilaterally. Chest wall:  No tenderness or deformity. Heart:  Regular rate and rhythm, S1, S2 normal, no murmur, click, rub or gallop. Abdomen:   Soft, non-tender. Bowel sounds normal. No masses,  No organomegaly. Extremities: Extremities normal, atraumatic, no cyanosis or edema. Neurologic:  Patient has clear voice. She follows command. Data Review:       Recent Days:  Recent Labs     12/14/21  0440 12/12/21 0443   WBC 5.5 6.9   HGB 7.4* 8.0*   HCT 23.2* 25.1*    274     Recent Labs     12/14/21 0440 12/12/21 0443    138   K 4.1 4.4    107   CO2 22 23   * 114*   BUN 39* 30*   CREA 1.55* 1.77*   CA 8.2* 8.7   ALB 3.0* 3.4*   ALT 8* 9*     No results for input(s): PH, PCO2, PO2, HCO3, FIO2 in the last 72 hours. 24 Hour Results:  Recent Results (from the past 24 hour(s))   CBC WITH AUTOMATED DIFF    Collection Time: 12/14/21  4:40 AM   Result Value Ref Range    WBC 5.5 3.6 - 11.0 K/uL    RBC 2.32 (L) 3.80 - 5.20 M/uL    HGB 7.4 (L) 11.5 - 16.0 g/dL    HCT 23.2 (L) 35.0 - 47.0 %    .0 (H) 80.0 - 99.0 FL    MCH 31.9 26.0 - 34.0 PG    MCHC 31.9 30.0 - 36.5 g/dL    RDW 14.6 (H) 11.5 - 14.5 %    PLATELET 873 820 - 683 K/uL    MPV 8.9 8.9 - 12.9 FL    NRBC 0.0 0  WBC    ABSOLUTE NRBC 0.00 0.00 - 0.01 K/uL    NEUTROPHILS 69 32 - 75 %    LYMPHOCYTES 15 12 - 49 %    MONOCYTES 10 5 - 13 %    EOSINOPHILS 5 0 - 7 %    BASOPHILS 1 0 - 1 %    IMMATURE GRANULOCYTES 0 0.0 - 0.5 %    ABS. NEUTROPHILS 3.8 1.8 - 8.0 K/UL    ABS. LYMPHOCYTES 0.8 0.8 - 3.5 K/UL    ABS. MONOCYTES 0.6 0.0 - 1.0 K/UL    ABS. EOSINOPHILS 0.3 0.0 - 0.4 K/UL    ABS. BASOPHILS 0.0 0.0 - 0.1 K/UL    ABS. IMM.  GRANS. 0.0 0.00 - 0.04 K/UL    DF AUTOMATED     METABOLIC PANEL, COMPREHENSIVE    Collection Time: 12/14/21  4:40 AM   Result Value Ref Range    Sodium 139 136 - 145 mmol/L    Potassium 4.1 3.5 - 5.1 mmol/L    Chloride 107 97 - 108 mmol/L    CO2 22 21 - 32 mmol/L    Anion gap 10 5 - 15 mmol/L    Glucose 106 (H) 65 - 100 mg/dL    BUN 39 (H) 6 - 20 MG/DL    Creatinine 1.55 (H) 0.55 - 1.02 MG/DL    BUN/Creatinine ratio 25 (H) 12 - 20      GFR est AA 39 (L) >60 ml/min/1.73m2    GFR est non-AA 32 (L) >60 ml/min/1.73m2    Calcium 8.2 (L) 8.5 - 10.1 MG/DL    Bilirubin, total 0.4 0.2 - 1.0 MG/DL    ALT (SGPT) 8 (L) 12 - 78 U/L    AST (SGOT) 14 (L) 15 - 37 U/L    Alk. phosphatase 81 45 - 117 U/L    Protein, total 5.3 (L) 6.4 - 8.2 g/dL    Albumin 3.0 (L) 3.5 - 5.0 g/dL    Globulin 2.3 2.0 - 4.0 g/dL    A-G Ratio 1.3 1.1 - 2.2         Problem List:  Problem List as of 12/14/2021 Date Reviewed: 8/26/2021          Codes Class Noted - Resolved    CAP (community acquired pneumonia) ICD-10-CM: J18.9  ICD-9-CM: 486  12/10/2021 - Present        Acute on chronic respiratory failure with hypoxia ([facility] ICD-10-CM: J96.21  ICD-9-CM: 518.84, 799.02  12/10/2021 - Present        Cellulitis and abscess of lower extremity ICD-10-CM: L03.119, L02.419  ICD-9-CM: 682.6  10/29/2021 - Present        SOB (shortness of breath) ICD-10-CM: R06.02  ICD-9-CM: 786.05  4/23/2021 - Present        UTI (urinary tract infection) ICD-10-CM: N39.0  ICD-9-CM: 599.0  4/9/2021 - Present        CHF exacerbation ([facility] ICD-10-CM: I50.9  ICD-9-CM: 428.0  9/25/2020 - Present        Weakness ICD-10-CM: R53.1  ICD-9-CM: 780.79  5/4/2020 - Present        Generalized weakness ICD-10-CM: R53.1  ICD-9-CM: 780.79  4/30/2020 - Present        Carotid artery stenosis, symptomatic, left ICD-10-CM: I35.21  ICD-9-CM: 433.10  7/26/2019 - Present        HTN (hypertension) ICD-10-CM: I10  ICD-9-CM: 401.9  7/17/2019 - Present        Acute ischemic stroke ([facility] ICD-10-CM: I63.9  ICD-9-CM: 434.91  7/12/2019 - Present        Fall ICD-10-CM: W19. Norvel Reef  ICD-9-CM: H644.3  12/24/2018 - Present        CKD (chronic kidney disease), stage III ([facility] ICD-10-CM: N18.30  ICD-9-CM: 585.3  7/8/2015 - Present        Edema ICD-10-CM: R60.9  ICD-9-CM: 782.3  5/6/2015 - Present        Diabetes mellitus ([facility] ICD-10-CM: E11.9  ICD-9-CM: 250.00  5/6/2015 - Present        Postoperative anemia due to acute blood loss ICD-10-CM: D62  ICD-9-CM: 285.1  2/14/2015 - Present        S/P CABG (coronary artery bypass graft) ICD-10-CM: Z95.1  ICD-9-CM: V45.81  2/13/2015 - Present        Syncope ICD-10-CM: R55  ICD-9-CM: 780.2  2/9/2015 - Present        Bradycardia ICD-10-CM: R00.1  ICD-9-CM: 427.89  2/9/2015 - Present        Acute kidney injury ([facility] ICD-10-CM: N17.9  ICD-9-CM: 584.9  2/9/2015 - Present        Anemia ICD-10-CM: D64.9  ICD-9-CM: 285.9  9/9/2014 - Present        GI bleed ICD-10-CM: K92.2  ICD-9-CM: 578.9  9/9/2014 - Present        AF (atrial fibrillation) (HCC) ICD-10-CM: I48.91  ICD-9-CM: 427.31  6/20/2014 - Present        Hypotension ICD-10-CM: I95.9  ICD-9-CM: 458.9  6/3/2014 - Present        Coronary artery disease ICD-10-CM: I25.10  ICD-9-CM: 414.00  6/3/2014 - Present    Overview Signed 2/10/2015 11:06 AM by Monroe Wayne     2007 PCI x2 to LAD with STEPHAN             S/P coronary artery stent placement ICD-10-CM: Z95.5  ICD-9-CM: V45.82  6/3/2014 - Present        Renal failure ICD-10-CM: N19  ICD-9-CM: 531  6/3/2014 - Present        Elevated troponin ICD-10-CM: R77.8  ICD-9-CM: 790.6  6/3/2014 - Present        Pericardial effusion ICD-10-CM: I31.3  ICD-9-CM: 423.9  6/3/2014 - Present        Lactic acidosis ICD-10-CM: E87.2  ICD-9-CM: 276.2  6/3/2014 - Present        RESOLVED: CHF (congestive heart failure) (HCC) ICD-10-CM: I50.9  ICD-9-CM: 428.0  9/22/2020 - 8/3/2021              Medications reviewed  Current Facility-Administered Medications   Medication Dose Route Frequency    ARIPiprazole (ABILIFY) tablet 20 mg  20 mg Oral DAILY    hydrALAZINE (APRESOLINE) tablet 50 mg  50 mg Oral TID    hydrALAZINE (APRESOLINE) 20 mg/mL injection 10 mg  10 mg IntraVENous Q6H PRN    furosemide (LASIX) injection 40 mg  40 mg IntraVENous Q MON, WED & SAT    sodium chloride (NS) flush 5-40 mL  5-40 mL IntraVENous Q8H    sodium chloride (NS) flush 5-40 mL  5-40 mL IntraVENous PRN    acetaminophen (TYLENOL) tablet 650 mg  650 mg Oral Q6H PRN    Or    acetaminophen (TYLENOL) suppository 650 mg  650 mg Rectal Q6H PRN    polyethylene glycol (MIRALAX) packet 17 g  17 g Oral DAILY PRN    ondansetron (ZOFRAN ODT) tablet 4 mg  4 mg Oral Q8H PRN    Or    ondansetron (ZOFRAN) injection 4 mg  4 mg IntraVENous Q6H PRN    apixaban (ELIQUIS) tablet 2.5 mg  2.5 mg Oral BID    amLODIPine (NORVASC) tablet 2.5 mg  2.5 mg Oral DAILY    atorvastatin (LIPITOR) tablet 40 mg  40 mg Oral QHS    busPIRone (BUSPAR) tablet 5 mg  5 mg Oral TID    carbidopa-levodopa (SINEMET)  mg per tablet 2 Tablet  2 Tablet Oral QID    carvediloL (COREG) tablet 3.125 mg  3.125 mg Oral BID WITH MEALS    DULoxetine (CYMBALTA) capsule 120 mg  120 mg Oral DAILY    gabapentin (NEURONTIN) capsule 100 mg  100 mg Oral QHS    melatonin tablet 6 mg  6 mg Oral QHS    pantoprazole (PROTONIX) tablet 40 mg  40 mg Oral ACB       Care Plan discussed with: Patient/Family, Nurse and     Total time spent with patient: 30 minutes.     Brenton Rueda MD

## 2021-12-14 NOTE — CONSULTS
[address]  [address] [facility], [address]  [phone]                     GI CONSULTATION NOTE      NAME:  Eligio Moyer   :   1940   MRN:   [id number]     Consult Date: 2021     Chief Complaint: Acute on chronic iron deficiency anemia    History of Present Illness:  Patient is a [de-identified] y.o. who is seen in consultation at the request of Dr. Román Mena for the above-mentioned problem/s. PMH includes A. Fib on Eliquis, CABG, cervical stenosis, CKD, CVA, DM, GERD, HTN, anxiety, dementia, O2 dependent - 2 L NC    Ms. Raheem Hernandez initially presented for respiratory distress with sats in the 46s on 2 L nasal cannula and was admitted for acute on chronic hypoxic respiratory failure secondary to pulmonary edema and CAP. We are asked to see the patient for downtrending due to acute on chronic NELLIE. Hemoglobin on admission 9.3, today 7.4. Iron studies done 10/31: FE 13 TIBC 140% sat 9 ferritin 136    Patient is a limited historian however per the chart appears that she was taking routine PPI and likely not using NSAIDs at the SNF. She reports she has been moving her bowels routinely but states she does not look too noticed hematochezia or melena. She has been eating well with no abdominal pain, nausea, vomiting. She is denying excessive fatigue, weakness, dizziness, syncope. I spoke with her nurse who states she has had no bowel movement on this shift and was not given a report that she had had prior bowel movement. This patient was seen by our group in consult during admission 2014 for GI bleed and severe anemia. Patient was taking aspirin and Aleve at the time. EGD 9/10/14 revealed a 1.5 x 1.0 cm crater-based duodenal bulb ulcer with a visible oozing vessel which was clipped ×2. Hemoglobin recovered as expected and she was doing well in the office during hospitalization follow-up.         PMH:  Past Medical History:   Diagnosis Date    Anxiety disorder     [facility] ([facility]     CAD (coronary artery disease) 2007    stents, CABG x 3v    Carotid stenosis     Cervical stenosis of spinal canal 07/2019    Chronic kidney disease     Cough     CVA (cerebral vascular accident) ([facility] 07/2019    left lacunar infarct at head of caudate    Depression     AND CHRONIC ANXIETY    Diabetes ([facility]     GERD (gastroesophageal reflux disease)     High cholesterol     History of peptic ulcer     Bleeding ulcer with increased NSAID use    Hypertension     Left carotid stenosis 07/2019    s/p left CEA with Dr. Nirmal Joseph, old 2007    PUD (peptic ulcer disease)     Stroke ([facility]     Tremor     Valvular heart disease        PSH:  Past Surgical History:   Procedure Laterality Date    HX CAROTID ENDARTERECTOMY  07/2019    HX CORONARY ARTERY BYPASS GRAFT      HX TONSILLECTOMY  1963    GA CARDIAC SURG PROCEDURE UNLIST      CABG X3 VESSEL    GA TOTAL KNEE ARTHROPLASTY Right 2015       Allergies:  No Known Allergies    Home Medications:  Prior to Admission Medications   Prescriptions Last Dose Informant Patient Reported? Taking? ARIPiprazole (ABILIFY) 20 mg tablet   Yes No   Sig: Take 20 mg by mouth daily. Cholecalciferol, Vitamin D3, 1,000 unit cap 12/9/2021 at Unknown time  Yes Yes   Sig: Take 1,000 Units by mouth daily. DULoxetine (CYMBALTA) 60 mg capsule 12/9/2021 at Unknown time  Yes Yes   Sig: Take 120 mg by mouth daily. Indications: anxiousness associated with depression   acetaminophen (TYLENOL) 325 mg tablet   Yes No   Sig: Take 650 mg by mouth every six (6) hours as needed for Pain or Fever. amLODIPine (NORVASC) 2.5 mg tablet 12/9/2021 at Unknown time  No Yes   Sig: Take 1 Tablet by mouth daily. apixaban (ELIQUIS) 2.5 mg tablet 12/9/2021 at Unknown time  No Yes   Sig: Take 1 Tablet by mouth two (2) times a day. atorvastatin (LIPITOR) 40 mg tablet 12/9/2021 at Unknown time  No Yes   Sig: Take 1 Tablet by mouth nightly.    busPIRone (BUSPAR) 5 mg tablet 2021 at Unknown time  No Yes   Sig: Take 1 Tab by mouth three (3) times daily. carbidopa-levodopa (SINEMET)  mg per tablet 2021 at Unknown time  No Yes   Sig: Take 2 Tabs by mouth four (4) times daily. carvediloL (COREG) 3.125 mg tablet 2021 at Unknown time  No Yes   Sig: Take 1 Tablet by mouth two (2) times daily (with meals). cilostazoL (PLETAL) 100 mg tablet   Yes No   Sig: Take  by mouth Before breakfast and dinner. clopidogreL (Plavix) 75 mg tab   Yes No   Sig: Take 75 mg by mouth daily (after breakfast). Patient not taking: Reported on 2021   cyanocobalamin (VITAMIN B12) 100 mcg tablet 2021 at Unknown time  Yes Yes   Sig: Take 100 mcg by mouth daily. gabapentin (NEURONTIN) 100 mg capsule 2021 at Unknown time  Yes Yes   Sig: Take 100 mg by mouth nightly. hydrALAZINE (APRESOLINE) 50 mg tablet Unknown at Unknown time  No No   Sig: Take 1 Tablet by mouth three (3) times daily. melatonin 3 mg tablet 2021 at Unknown time  Yes Yes   Sig: Take 6 mg by mouth nightly. multivitamins-minerals-lutein (CENTRUM SILVER) tab tablet 2021 at Unknown time  Yes Yes   Sig: Take 1 Tablet by mouth daily. nitroglycerin (Nitrostat) 0.4 mg SL tablet 12/10/2021 at 0300  Yes Yes   Si.4 mg by SubLINGual route every five (5) minutes as needed for Chest Pain. Up to 3 doses. pantoprazole (PROTONIX) 40 mg tablet 2021 at Unknown time  Yes Yes   Sig: Take 40 mg by mouth Daily (before breakfast). Indications: h/o bleeding ulcer with nsaid   potassium chloride SR (KLOR-CON 10) 10 mEq tablet   Yes No   Sig: Take 20 mEq by mouth daily. senna-docusate (SENNA WITH DOCUSATE SODIUM) 8.6-50 mg per tablet   Yes No   Sig: Take 1 Tab by mouth nightly. Patient not taking: Reported on 2021   vit C,V-Gw-tzjwx-lutein-zeaxan (PRESERVISION AREDS-2) 873-306-25-1 mg-unit-mg-mg cap capsule   Yes No   Sig: Take 1 Cap by mouth two (2) times a day.    Patient not taking: Reported on 2021      Facility-Administered Medications: None       Hospital Medications:  Current Facility-Administered Medications   Medication Dose Route Frequency    ARIPiprazole (ABILIFY) tablet 20 mg  20 mg Oral DAILY    hydrALAZINE (APRESOLINE) tablet 50 mg  50 mg Oral TID    hydrALAZINE (APRESOLINE) 20 mg/mL injection 10 mg  10 mg IntraVENous Q6H PRN    furosemide (LASIX) injection 40 mg  40 mg IntraVENous Q MON, WED & SAT    sodium chloride (NS) flush 5-40 mL  5-40 mL IntraVENous Q8H    sodium chloride (NS) flush 5-40 mL  5-40 mL IntraVENous PRN    acetaminophen (TYLENOL) tablet 650 mg  650 mg Oral Q6H PRN    Or    acetaminophen (TYLENOL) suppository 650 mg  650 mg Rectal Q6H PRN    polyethylene glycol (MIRALAX) packet 17 g  17 g Oral DAILY PRN    ondansetron (ZOFRAN ODT) tablet 4 mg  4 mg Oral Q8H PRN    Or    ondansetron (ZOFRAN) injection 4 mg  4 mg IntraVENous Q6H PRN    apixaban (ELIQUIS) tablet 2.5 mg  2.5 mg Oral BID    amLODIPine (NORVASC) tablet 2.5 mg  2.5 mg Oral DAILY    atorvastatin (LIPITOR) tablet 40 mg  40 mg Oral QHS    busPIRone (BUSPAR) tablet 5 mg  5 mg Oral TID    carbidopa-levodopa (SINEMET)  mg per tablet 2 Tablet  2 Tablet Oral QID    carvediloL (COREG) tablet 3.125 mg  3.125 mg Oral BID WITH MEALS    DULoxetine (CYMBALTA) capsule 120 mg  120 mg Oral DAILY    gabapentin (NEURONTIN) capsule 100 mg  100 mg Oral QHS    melatonin tablet 6 mg  6 mg Oral QHS    pantoprazole (PROTONIX) tablet 40 mg  40 mg Oral ACB       Social History:  Social History     Tobacco Use    Smoking status: Former Smoker     Packs/day: 0.25     Years: 5.00     Pack years: 1.25     Types: Cigarettes     Quit date: April Dixon     Years since quittin.9    Smokeless tobacco: Never Used   Substance Use Topics    Alcohol use: Not Currently     Comment: Rare       Family History:  Family History   Problem Relation Age of Onset    Heart Attack Mother 72        Dec 89yo    Hypertension Mother     Other Father         Unknown    Parkinson's Disease Brother     Anesth Problems Neg Hx        Review of Systems:    Constitutional: negative fever, negative chills, negative weight loss  Eyes:   negative visual changes  ENT:   negative sore throat, tongue or lip swelling  Respiratory:  negative cough, negative dyspnea  Cards:  negative for chest pain, palpitations, lower extremity edema  GI:   See HPI  :  negative for frequency, dysuria  Integument:  negative for rash and pruritus  Heme:  negative for easy bruising and gum/nose bleeding  Musculoskel: negative for myalgias,  back pain and muscle weakness  Neuro: negative for headaches, dizziness, vertigo  Psych:  negative for feelings of anxiety, depression      Objective:     Patient Vitals for the past 8 hrs:   BP Temp Pulse Resp SpO2   12/14/21 1346 120/65 98 °F (36.7 °C) (!) 58 18 98 %   12/14/21 1303   65     12/14/21 1004   69     12/14/21 0826 (!) 162/74 97.6 °F (36.4 °C) 78 16 90 %   12/14/21 0557   64       No intake/output data recorded. 12/12 1901 - 12/14 0700  In: -   Out: 480 [Urine:480]      PHYSICAL EXAM:  General appearance: cooperative, no distress  Skin: Pale No jaundice,  rashes   HEENT: Sclerae anicteric. Cardiovascular: Regular rate and rhythm. [place] Respiratory: Comfortable breathing . Clear breath sounds   GI: Abdomen nondistended, soft, and nontender. Normal active bowel sounds. No enlargement of the liver or spleen. No masses palpable. Rectal: Deferred   Musculoskeletal: No pitting edema of the lower legs. Neurological: Awake pleasant conversable. Memory deficit  - limited historian  Psychiatric: Mood appropriate with good judgement. No anxiety or agitation.       Data Review     Recent Labs     12/14/21 0440 12/12/21 0443   WBC 5.5 6.9   HGB 7.4* 8.0*   HCT 23.2* 25.1*    274     Recent Labs     12/14/21 0440 12/12/21 0443    138   K 4.1 4.4    107   CO2 22 23   BUN 39* 30*   CREA 1.55* 1.77*   * 114*   CA 8.2* 8.7     Recent Labs     12/14/21  0440 12/12/21  0443   AP 81 95   TP 5.3* 6.2*   ALB 3.0* 3.4*   GLOB 2.3 2.8     No results for input(s): INR, PTP, APTT, INREXT in the last 72 hours. Imaging studies reviewed    Assessment / Plan :     Anemia  H/o duodenal ulcer (NSAID related)   We are asked to see Mrs. Frazier for acute on chronic anemia with downtrending hemoglobin since admission. The patient is not a reliable historian and per nursing, she has not had any melena or hematochezia. She does have history of duodenal bulb ulcer actively bleeding at time of the of EGD in 2014 although this was likely related to frequent NSAID use. It appears she has been on PPI since this time. She is on chronic anticoagulation with Eliquis  Despite no obvious signs of active bleeding, given her history will consider EGD for evaluation of recurrent ulcer exacerbated by anticoagulation. Would be most ideal patient off Eliquis for 2-3 days prior to the procedure. Timing and further recommendations per Dr. Ashley De La Paz. Continue to monitor hemoglobin, transfuse for< 7  Continue PPI  Continue regular diet    Addendum:   Will add on for EGD/colo 12/16. I LM for sandy [place] re plan  - Hold Eliquis  - Clear liquids 12/15  - NPO after MN 12/16  - Colyte start 12/15 1500       Patient Active Hospital Problem List:   Active Problems:    Elevated troponin (6/3/2014)      CAP (community acquired pneumonia) (12/10/2021)      Acute on chronic respiratory failure with hypoxia ([facility] (12/10/2021)    ATTENDING ADDENDUM OF TABBY NOTE:  I saw and evaluated Kristina Michaud on the above date of service and discussed the care with the nurse practitioner. I agree with the findings and plan as documented in the note above and have edited it to reflect my findings and plan.     Elderly female with atrial fib on Eliquis, h/o CABG, chronic hypoxemia on home O2, dementia was admitted for hypoxemic respiratory failure secondary to pneumonia and pulmonary edema. Also found to have acute on chronic iron deficiency anemia. Patient is unable to give any reliable history. No reports of overt bleeding. Has a history of duodenal ulcer in 2014 requiring endoscopic hemostasis in the setting of NSAID use; unknown if these have been discontinued. Will proceed with EGD and colonoscopy tentatively for 12/17 when Eliquis has been held for 72 hours (per ASGE guidelines for her GFR).

## 2021-12-15 LAB
ALBUMIN SERPL-MCNC: 3 G/DL (ref 3.5–5)
ALBUMIN/GLOB SERPL: 1.3 {RATIO} (ref 1.1–2.2)
ALP SERPL-CCNC: 81 U/L (ref 45–117)
ALT SERPL-CCNC: 8 U/L (ref 12–78)
ANION GAP SERPL CALC-SCNC: 7 MMOL/L (ref 5–15)
AST SERPL-CCNC: 13 U/L (ref 15–37)
BACTERIA SPEC CULT: NORMAL
BASOPHILS # BLD: 0 K/UL (ref 0–0.1)
BASOPHILS NFR BLD: 1 % (ref 0–1)
BILIRUB SERPL-MCNC: 0.5 MG/DL (ref 0.2–1)
BUN SERPL-MCNC: 36 MG/DL (ref 6–20)
BUN/CREAT SERPL: 23 (ref 12–20)
CALCIUM SERPL-MCNC: 8.6 MG/DL (ref 8.5–10.1)
CHLORIDE SERPL-SCNC: 107 MMOL/L (ref 97–108)
CO2 SERPL-SCNC: 24 MMOL/L (ref 21–32)
CREAT SERPL-MCNC: 1.54 MG/DL (ref 0.55–1.02)
DIFFERENTIAL METHOD BLD: ABNORMAL
EOSINOPHIL # BLD: 0.3 K/UL (ref 0–0.4)
EOSINOPHIL NFR BLD: 6 % (ref 0–7)
ERYTHROCYTE [DISTWIDTH] IN BLOOD BY AUTOMATED COUNT: 14.7 % (ref 11.5–14.5)
GLOBULIN SER CALC-MCNC: 2.3 G/DL (ref 2–4)
GLUCOSE SERPL-MCNC: 101 MG/DL (ref 65–100)
HCT VFR BLD AUTO: 23.8 % (ref 35–47)
HEMOCCULT STL QL: NEGATIVE
HGB BLD-MCNC: 7.7 G/DL (ref 11.5–16)
IMM GRANULOCYTES # BLD AUTO: 0 K/UL (ref 0–0.04)
IMM GRANULOCYTES NFR BLD AUTO: 0 % (ref 0–0.5)
LYMPHOCYTES # BLD: 0.9 K/UL (ref 0.8–3.5)
LYMPHOCYTES NFR BLD: 20 % (ref 12–49)
MCH RBC QN AUTO: 32.1 PG (ref 26–34)
MCHC RBC AUTO-ENTMCNC: 32.4 G/DL (ref 30–36.5)
MCV RBC AUTO: 99.2 FL (ref 80–99)
MONOCYTES # BLD: 0.4 K/UL (ref 0–1)
MONOCYTES NFR BLD: 9 % (ref 5–13)
NEUTS SEG # BLD: 3.1 K/UL (ref 1.8–8)
NEUTS SEG NFR BLD: 64 % (ref 32–75)
NRBC # BLD: 0 K/UL (ref 0–0.01)
NRBC BLD-RTO: 0 PER 100 WBC
PLATELET # BLD AUTO: 267 K/UL (ref 150–400)
PMV BLD AUTO: 8.9 FL (ref 8.9–12.9)
POTASSIUM SERPL-SCNC: 4.3 MMOL/L (ref 3.5–5.1)
PROT SERPL-MCNC: 5.3 G/DL (ref 6.4–8.2)
RBC # BLD AUTO: 2.4 M/UL (ref 3.8–5.2)
SERVICE CMNT-IMP: NORMAL
SODIUM SERPL-SCNC: 138 MMOL/L (ref 136–145)
WBC # BLD AUTO: 4.7 K/UL (ref 3.6–11)

## 2021-12-15 PROCEDURE — 85025 COMPLETE CBC W/AUTO DIFF WBC: CPT

## 2021-12-15 PROCEDURE — 36415 COLL VENOUS BLD VENIPUNCTURE: CPT

## 2021-12-15 PROCEDURE — 74011250636 HC RX REV CODE- 250/636: Performed by: INTERNAL MEDICINE

## 2021-12-15 PROCEDURE — 97535 SELF CARE MNGMENT TRAINING: CPT

## 2021-12-15 PROCEDURE — 74011250637 HC RX REV CODE- 250/637: Performed by: FAMILY MEDICINE

## 2021-12-15 PROCEDURE — 80053 COMPREHEN METABOLIC PANEL: CPT

## 2021-12-15 PROCEDURE — 74011250637 HC RX REV CODE- 250/637: Performed by: INTERNAL MEDICINE

## 2021-12-15 PROCEDURE — 82272 OCCULT BLD FECES 1-3 TESTS: CPT

## 2021-12-15 PROCEDURE — 65660000000 HC RM CCU STEPDOWN

## 2021-12-15 PROCEDURE — 97116 GAIT TRAINING THERAPY: CPT

## 2021-12-15 PROCEDURE — 77030038269 HC DRN EXT URIN PURWCK BARD -A

## 2021-12-15 RX ADMIN — GABAPENTIN 100 MG: 100 CAPSULE ORAL at 22:11

## 2021-12-15 RX ADMIN — Medication 10 ML: at 07:04

## 2021-12-15 RX ADMIN — Medication 10 ML: at 14:00

## 2021-12-15 RX ADMIN — FUROSEMIDE 40 MG: 10 INJECTION, SOLUTION INTRAMUSCULAR; INTRAVENOUS at 12:15

## 2021-12-15 RX ADMIN — CARBIDOPA AND LEVODOPA 2 TABLET: 25; 100 TABLET ORAL at 18:46

## 2021-12-15 RX ADMIN — CARVEDILOL 3.12 MG: 3.12 TABLET, FILM COATED ORAL at 18:46

## 2021-12-15 RX ADMIN — BUSPIRONE HYDROCHLORIDE 5 MG: 5 TABLET ORAL at 10:13

## 2021-12-15 RX ADMIN — Medication 6 MG: at 22:11

## 2021-12-15 RX ADMIN — CARVEDILOL 3.12 MG: 3.12 TABLET, FILM COATED ORAL at 10:13

## 2021-12-15 RX ADMIN — Medication 10 ML: at 22:11

## 2021-12-15 RX ADMIN — BUSPIRONE HYDROCHLORIDE 5 MG: 5 TABLET ORAL at 16:42

## 2021-12-15 RX ADMIN — HYDRALAZINE HYDROCHLORIDE 50 MG: 50 TABLET, FILM COATED ORAL at 10:14

## 2021-12-15 RX ADMIN — CARBIDOPA AND LEVODOPA 2 TABLET: 25; 100 TABLET ORAL at 10:14

## 2021-12-15 RX ADMIN — PANTOPRAZOLE SODIUM 40 MG: 40 TABLET, DELAYED RELEASE ORAL at 07:03

## 2021-12-15 RX ADMIN — DULOXETINE 120 MG: 60 CAPSULE, DELAYED RELEASE ORAL at 10:13

## 2021-12-15 RX ADMIN — ARIPIPRAZOLE 20 MG: 5 TABLET ORAL at 10:19

## 2021-12-15 RX ADMIN — BUSPIRONE HYDROCHLORIDE 5 MG: 5 TABLET ORAL at 22:11

## 2021-12-15 RX ADMIN — HYDRALAZINE HYDROCHLORIDE 50 MG: 50 TABLET, FILM COATED ORAL at 22:19

## 2021-12-15 RX ADMIN — AMLODIPINE BESYLATE 2.5 MG: 5 TABLET ORAL at 10:14

## 2021-12-15 RX ADMIN — CARBIDOPA AND LEVODOPA 2 TABLET: 25; 100 TABLET ORAL at 12:15

## 2021-12-15 RX ADMIN — CARBIDOPA AND LEVODOPA 2 TABLET: 25; 100 TABLET ORAL at 22:11

## 2021-12-15 RX ADMIN — HYDRALAZINE HYDROCHLORIDE 50 MG: 50 TABLET, FILM COATED ORAL at 16:42

## 2021-12-15 RX ADMIN — ATORVASTATIN CALCIUM 40 MG: 40 TABLET, FILM COATED ORAL at 22:11

## 2021-12-15 NOTE — PROGRESS NOTES
Problem: Mobility Impaired (Adult and Pediatric)  Goal: *Acute Goals and Plan of Care (Insert Text)  Description: FUNCTIONAL STATUS PRIOR TO ADMISSION: The patient was functional at the wheelchair level and was modified independent for transfers to the wheelchair per pt report. Pt denies any falls in the last year. HOME SUPPORT PRIOR TO ADMISSION: The patient lived in [facility] due to her apartment lease ending in November 2021 and is looking for new living arrangements. Pt reports she has a niece in the area that assist her. Physical Therapy Goals  Initiated 12/11/2021  1. Patient will move from supine to sit and sit to supine  in bed with moderate assistance  within 7 day(s). 2.  Patient will transfer from bed to chair and chair to bed with moderate assistance  using the least restrictive device within 7 day(s). 3.  Patient will perform sit to stand with moderate assistance  within 7 day(s). Outcome: Progressing Towards Goal   PHYSICAL THERAPY TREATMENT  Patient: Priscila Diaz (18 y.o. female)  Date: 12/15/2021  Diagnosis: Acute on chronic respiratory failure with hypoxia (HCC) [J96.21]  Elevated troponin [R77.8]  CAP (community acquired pneumonia) [J18.9] <principal problem not specified>  Procedure(s) (LRB):  ESOPHAGOGASTRODUODENOSCOPY (EGD) (N/A)  COLONOSCOPY   :- (N/A)    Precautions: Contact  Chart, physical therapy assessment, plan of care and goals were reviewed. ASSESSMENT  Patient continues with skilled PT services and is slowly progressing towards goals. Pt received sitting in bedside chair, on RA, and agreeable to participate with therapy. Pt requires moderate assist x 2 sit >stand due to posterior lean with thighs pushing against chair causing it to slide and BLE sliding forward. Facilitated transition to stand with blocking of bilateral feet and securing chair. Pt stands with flexed posture and ambulated a few feet forward with small, shuffling steps.   Pt returned to chair but reports her brief was soiled. Pt stood again for brief change and total assist for hygiene. SPO2 on RA 92%. Pt agreeable to remain in chair at session end. Current Level of Function Impacting Discharge (mobility/balance): sit to stand with moderate assist x 2, ambulation with rolling walker x 3 feet with moderate assist x 2    Other factors to consider for discharge: fall risk, below her stated baseline level of function         PLAN :  Patient continues to benefit from skilled intervention to address the above impairments. Continue treatment per established plan of care. to address goals. Recommendation for discharge: (in order for the patient to meet his/her long term goals)  Therapy up to 5 days/week in SNF setting    This discharge recommendation:  A follow-up discussion with the attending provider and/or case management is planned         SUBJECTIVE:   Patient stated I think I had a BM.     OBJECTIVE DATA SUMMARY:   Critical Behavior:  Neurologic State: Alert  Orientation Level: Oriented to person,Oriented to place  Cognition: Follows commands  Safety/Judgement: Awareness of environment  Functional Mobility Training:  Bed Mobility:   Not assessed, OOB in chair               Transfers:  Sit to Stand: Moderate assistance;Assist x2; Additional time; Adaptive equipment  Stand to Sit: Minimum assistance;Assist x2; Additional time; Adaptive equipment                          Balance:  Sitting: Intact  Standing: Impaired  Standing - Static: Constant support; Fair  Standing - Dynamic : Constant support;Poor  Ambulation/Gait Training:  Distance (ft): 3 Feet (ft)  Assistive Device: Walker, rolling;Gait belt  Ambulation - Level of Assistance: Moderate assistance;Assist x2; Additional time; Adaptive equipment        Gait Abnormalities: Shuffling gait;Trunk sway increased;Decreased step clearance        Base of Support: Narrowed     Speed/Samira: Shuffled;Pace decreased (<100 feet/min)  Step Length: Left shortened;Right shortened             Pain Rating:  No complaints    Activity Tolerance:   Fair, fatigues quickly     After treatment patient left in no apparent distress:   Sitting in chair, Call bell within reach, and Bed / chair alarm activated    COMMUNICATION/COLLABORATION:   The patients plan of care was discussed with: Physician.      Shadia Bustamante   Time Calculation: 14 mins

## 2021-12-15 NOTE — PROGRESS NOTES
Hospitalist Progress Note          Clara Ruano MD  Please call  and page for questions. Call physician on-call through the  7pm-7am    Daily Progress Note: 12/15/2021    Primary care provider:Karol Arce DO    Date of admission: 12/10/2021  3:28 AM    Admission summery and hospital course:  HPI: 81 y.o. 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   Per record, she was picked up from [facility] where she was found to be hypoxic to the fifties on her normal 2 L nasal cannula.    She was recently admitted from 10/29 to 11/3 for sepsis 2/2 proteus UTI.   In the ED, SBP was elevated to one nineties.  Per record, SPO2 is 100% on BiPAP.  Labs were significant for WBC 15.7, creatinine 1.94, CO2 20, troponin 2,017, and probnp 18,236.  Rapid covid is negative. Subjective:   Patient said she is doing okay. She denies any acute issues today. Assessment/Plan:   Acute on Chronic Hypoxic Respiratory Failure  Pulmonary edema  Left lower lobe airspace disease with effusion  Leukocytosis resolved, afebrile.     Continue supplemental oxygen.  Patient received Lasix 80 mg IV on admission.    Continue to monitor I&O, daily weight.    Completed 5 days azithromycin and ceftriaxone.    Follow cultures, strep pneumonia antigen was negative.    CRP was 0.82, rapid Covid 19 test was negative, Legionella pneumonia antigen was negative RSV by PCR was negative and influenza A is negative.        Anemia,  acute on chronic  History of iron deficiency anemia. Appreciated GI input, patient will have colonoscopy and EGD on 12/17.   Monitor CBC, H&H stable now, hemodynamically stable.     Hypertensive Emergency  Elevated Troponin  Asymptomatic, EKG with no hyperacute changes. Likely demand from htn, pna, and HF. Troponin is trending down towards normal.  Blood pressure is fragile, continue home carvedilol, hydralazine and Norvasc. Hydralazine IV as necessary.     Dementia and mood disorder  Patient knows she is at the hospital, she is oriented to her , month and year. Probably at the baseline.    Continue outpatient melatonin, cymbalta, and buspar.     Parkinsons: continue home sinemet.   Gastroesophageal reflux disease: continue pantoprazole.     Paroxysmal atrial fibrillation; Rate controlled with carvedilol, continue apixaban.        See orders for other plans. VTE prophylaxis: Patient is not apixaban. Code status: DNR  Discussed plan of care with Patient/Family and Nurse. MPOA: Cyrus Frazier listed as relative  Pre-admission lived at home. Discharge planning:  Possible discharge to her facility on Friday p.m. or Saturday a.m. Review of Systems:     Review of Systems:  Symptom  Y/N  Comments   Symptom  Y/N  Comments    Fever/Chills  n    Chest Pain  n    Poor Appetite  n    Edema  n     Cough  n   Abdominal Pain  n     Sputum  n   Joint Pain      SOB/SALAMANCA  n   Pruritis/Rash      Nausea/vomit     Tolerating PT/OT      Diarrhea     Tolerating Diet      Constipation     Other      Could not obtain due to:         Objective:   Physical Exam:     Visit Vitals  BP (!) 107/51   Pulse (!) 59   Temp 97.4 °F (36.3 °C)   Resp 16   Wt 76 kg (167 lb 8.8 oz)   SpO2 96%   BMI 27.88 kg/m²    O2 Flow Rate (L/min): 2 l/min O2 Device: Nasal cannula    Temp (24hrs), Av.9 °F (36.6 °C), Min:97.4 °F (36.3 °C), Max:98.3 °F (36.8 °C)    No intake/output data recorded. No intake/output data recorded. General:  Alert, cooperative, no distress, appears stated age.  Patient is comfortably laying in the bed. Lungs:    Clear to auscultation bilaterally.     Chest wall:  No tenderness or deformity. Heart:  Regular rate and rhythm, S1, S2 normal, no murmur. Abdomen:   Soft, non-tender. Bowel sounds normal.    Extremities: Extremities normal, atraumatic, no cyanosis or edema. Neurologic:  Patient has clear voice.  She follows command.          Data Review:       Recent Days:  Recent Labs     12/15/21  0305 12/14/21  0440   WBC 4.7 5.5   HGB 7.7* 7.4*   HCT 23.8* 23.2*    244     Recent Labs     12/15/21  0305 12/14/21  0440    139   K 4.3 4.1    107   CO2 24 22   * 106*   BUN 36* 39*   CREA 1.54* 1.55*   CA 8.6 8.2*   ALB 3.0* 3.0*   ALT 8* 8*     No results for input(s): PH, PCO2, PO2, HCO3, FIO2 in the last 72 hours. 24 Hour Results:  Recent Results (from the past 24 hour(s))   OCCULT BLOOD, STOOL    Collection Time: 12/15/21  3:05 AM   Result Value Ref Range    Occult blood, stool Negative NEG     CBC WITH AUTOMATED DIFF    Collection Time: 12/15/21  3:05 AM   Result Value Ref Range    WBC 4.7 3.6 - 11.0 K/uL    RBC 2.40 (L) 3.80 - 5.20 M/uL    HGB 7.7 (L) 11.5 - 16.0 g/dL    HCT 23.8 (L) 35.0 - 47.0 %    MCV 99.2 (H) 80.0 - 99.0 FL    MCH 32.1 26.0 - 34.0 PG    MCHC 32.4 30.0 - 36.5 g/dL    RDW 14.7 (H) 11.5 - 14.5 %    PLATELET 315 029 - 480 K/uL    MPV 8.9 8.9 - 12.9 FL    NRBC 0.0 0  WBC    ABSOLUTE NRBC 0.00 0.00 - 0.01 K/uL    NEUTROPHILS 64 32 - 75 %    LYMPHOCYTES 20 12 - 49 %    MONOCYTES 9 5 - 13 %    EOSINOPHILS 6 0 - 7 %    BASOPHILS 1 0 - 1 %    IMMATURE GRANULOCYTES 0 0.0 - 0.5 %    ABS. NEUTROPHILS 3.1 1.8 - 8.0 K/UL    ABS. LYMPHOCYTES 0.9 0.8 - 3.5 K/UL    ABS. MONOCYTES 0.4 0.0 - 1.0 K/UL    ABS. EOSINOPHILS 0.3 0.0 - 0.4 K/UL    ABS. BASOPHILS 0.0 0.0 - 0.1 K/UL    ABS. IMM.  GRANS. 0.0 0.00 - 0.04 K/UL    DF AUTOMATED     METABOLIC PANEL, COMPREHENSIVE    Collection Time: 12/15/21  3:05 AM   Result Value Ref Range    Sodium 138 136 - 145 mmol/L    Potassium 4.3 3.5 - 5.1 mmol/L    Chloride 107 97 - 108 mmol/L    CO2 24 21 - 32 mmol/L    Anion gap 7 5 - 15 mmol/L    Glucose 101 (H) 65 - 100 mg/dL    BUN 36 (H) 6 - 20 MG/DL    Creatinine 1.54 (H) 0.55 - 1.02 MG/DL    BUN/Creatinine ratio 23 (H) 12 - 20      GFR est AA 39 (L) >60 ml/min/1.73m2    GFR est non-AA 32 (L) >60 ml/min/1.73m2    Calcium 8.6 8.5 - 10.1 MG/DL    Bilirubin, total 0.5 0.2 - 1.0 MG/DL    ALT (SGPT) 8 (L) 12 - 78 U/L    AST (SGOT) 13 (L) 15 - 37 U/L    Alk. phosphatase 81 45 - 117 U/L    Protein, total 5.3 (L) 6.4 - 8.2 g/dL    Albumin 3.0 (L) 3.5 - 5.0 g/dL    Globulin 2.3 2.0 - 4.0 g/dL    A-G Ratio 1.3 1.1 - 2.2         Problem List:  Problem List as of 12/15/2021 Date Reviewed: 8/26/2021          Codes Class Noted - Resolved    CAP (community acquired pneumonia) ICD-10-CM: J18.9  ICD-9-CM: 486  12/10/2021 - Present        Acute on chronic respiratory failure with hypoxia ([facility] ICD-10-CM: J96.21  ICD-9-CM: 518.84, 799.02  12/10/2021 - Present        Cellulitis and abscess of lower extremity ICD-10-CM: L03.119, L02.419  ICD-9-CM: 682.6  10/29/2021 - Present        SOB (shortness of breath) ICD-10-CM: R06.02  ICD-9-CM: 786.05  4/23/2021 - Present        UTI (urinary tract infection) ICD-10-CM: N39.0  ICD-9-CM: 599.0  4/9/2021 - Present        CHF exacerbation ([facility] ICD-10-CM: I50.9  ICD-9-CM: 428.0  9/25/2020 - Present        Weakness ICD-10-CM: R53.1  ICD-9-CM: 780.79  5/4/2020 - Present        Generalized weakness ICD-10-CM: R53.1  ICD-9-CM: 780.79  4/30/2020 - Present        Carotid artery stenosis, symptomatic, left ICD-10-CM: Y16.52  ICD-9-CM: 433.10  7/26/2019 - Present        HTN (hypertension) ICD-10-CM: I10  ICD-9-CM: 401.9  7/17/2019 - Present        Acute ischemic stroke ([facility] ICD-10-CM: I63.9  ICD-9-CM: 434.91  7/12/2019 - Present        Fall ICD-10-CM: Tamiko Mart. Thomas Sams  ICD-9-CM: E888.9  12/24/2018 - Present        CKD (chronic kidney disease), stage III ([facility] ICD-10-CM: N18.30  ICD-9-CM: 585.3  7/8/2015 - Present        Edema ICD-10-CM: R60.9  ICD-9-CM: 782.3  5/6/2015 - Present Diabetes mellitus ([facility] ICD-10-CM: E11.9  ICD-9-CM: 250.00  5/6/2015 - Present        Postoperative anemia due to acute blood loss ICD-10-CM: D62  ICD-9-CM: 285.1  2/14/2015 - Present        S/P CABG (coronary artery bypass graft) ICD-10-CM: Z95.1  ICD-9-CM: V45.81  2/13/2015 - Present        Syncope ICD-10-CM: R55  ICD-9-CM: 780.2  2/9/2015 - Present        Bradycardia ICD-10-CM: R00.1  ICD-9-CM: 427.89  2/9/2015 - Present        Acute kidney injury ([facility] ICD-10-CM: N17.9  ICD-9-CM: 584.9  2/9/2015 - Present        Anemia ICD-10-CM: D64.9  ICD-9-CM: 285.9  9/9/2014 - Present        GI bleed ICD-10-CM: K92.2  ICD-9-CM: 578.9  9/9/2014 - Present        AF (atrial fibrillation) (HCC) ICD-10-CM: I48.91  ICD-9-CM: 427.31  6/20/2014 - Present        Hypotension ICD-10-CM: I95.9  ICD-9-CM: 458.9  6/3/2014 - Present        Coronary artery disease ICD-10-CM: I25.10  ICD-9-CM: 414.00  6/3/2014 - Present    Overview Signed 2/10/2015 11:06 AM by Yaw Riley     2007 PCI x2 to LAD with STEPHAN             S/P coronary artery stent placement ICD-10-CM: Z95.5  ICD-9-CM: V45.82  6/3/2014 - Present        Renal failure ICD-10-CM: N19  ICD-9-CM: 645  6/3/2014 - Present        Elevated troponin ICD-10-CM: R77.8  ICD-9-CM: 790.6  6/3/2014 - Present        Pericardial effusion ICD-10-CM: I31.3  ICD-9-CM: 423.9  6/3/2014 - Present        Lactic acidosis ICD-10-CM: E87.2  ICD-9-CM: 276.2  6/3/2014 - Present        RESOLVED: CHF (congestive heart failure) (HCC) ICD-10-CM: I50.9  ICD-9-CM: 428.0  9/22/2020 - 8/3/2021              Medications reviewed  Current Facility-Administered Medications   Medication Dose Route Frequency    [START ON 12/16/2021] peg 3350-electrolytes (COLYTE) 4000 mL  4,000 mL Oral ONCE    ARIPiprazole (ABILIFY) tablet 20 mg  20 mg Oral DAILY    hydrALAZINE (APRESOLINE) tablet 50 mg  50 mg Oral TID    hydrALAZINE (APRESOLINE) 20 mg/mL injection 10 mg  10 mg IntraVENous Q6H PRN    furosemide (LASIX) injection 40 mg  40 mg IntraVENous Q MON, WED & SAT    sodium chloride (NS) flush 5-40 mL  5-40 mL IntraVENous Q8H    sodium chloride (NS) flush 5-40 mL  5-40 mL IntraVENous PRN    acetaminophen (TYLENOL) tablet 650 mg  650 mg Oral Q6H PRN    Or    acetaminophen (TYLENOL) suppository 650 mg  650 mg Rectal Q6H PRN    polyethylene glycol (MIRALAX) packet 17 g  17 g Oral DAILY PRN    ondansetron (ZOFRAN ODT) tablet 4 mg  4 mg Oral Q8H PRN    Or    ondansetron (ZOFRAN) injection 4 mg  4 mg IntraVENous Q6H PRN    apixaban (ELIQUIS) tablet 2.5 mg  2.5 mg Oral BID    amLODIPine (NORVASC) tablet 2.5 mg  2.5 mg Oral DAILY    atorvastatin (LIPITOR) tablet 40 mg  40 mg Oral QHS    busPIRone (BUSPAR) tablet 5 mg  5 mg Oral TID    carbidopa-levodopa (SINEMET)  mg per tablet 2 Tablet  2 Tablet Oral QID    carvediloL (COREG) tablet 3.125 mg  3.125 mg Oral BID WITH MEALS    DULoxetine (CYMBALTA) capsule 120 mg  120 mg Oral DAILY    gabapentin (NEURONTIN) capsule 100 mg  100 mg Oral QHS    melatonin tablet 6 mg  6 mg Oral QHS    pantoprazole (PROTONIX) tablet 40 mg  40 mg Oral ACB       Care Plan discussed with: Patient/Family, Nurse and     Total time spent with patient: 30 minutes.     Minh Gomez MD

## 2021-12-15 NOTE — PROGRESS NOTES
[address]  [address]  [address] G[place]le St[place], [address]  [phone]                     GI PROGRESS NOTE    Patient Name: Carlene Posada      : 1940      MRN: [id number]  Admit Date: 12/10/2021  Today's Date: 12/15/2021    Subjective:     Resting in bed, just completed clear liquid breakfast.  Denies abdominal pain or nausea. Has not had bowel movement since admission, does not recall seeing any bleeding. Objective:     Blood pressure (!) 162/72, pulse (!) 59, temperature 98 °F (36.7 °C), resp. rate 16, weight 76 kg (167 lb 8.8 oz), SpO2 95 %. Physical Exam:  General appearance: cooperative, no distress  Skin: Pale No jaundice,  rashes   HEENT: Sclerae anicteric. Cardiovascular: Regular rate and rhythm. Cleophus Garryowen Respiratory: Comfortable breathing . Clear breath sounds   GI: Abdomen nondistended, soft, and nontender. Normal active bowel sounds. Rectal: Deferred   Musculoskeletal: No pitting edema of the lower legs. Neurological: Awake pleasant conversable. Memory deficit  - limited historian  Psychiatric: Mood appropriate with good judgement. No anxiety or agitation      Data Review:    Recent Results (from the past 24 hour(s))   OCCULT BLOOD, STOOL    Collection Time: 12/15/21  3:05 AM   Result Value Ref Range    Occult blood, stool Negative NEG     CBC WITH AUTOMATED DIFF    Collection Time: 12/15/21  3:05 AM   Result Value Ref Range    WBC 4.7 3.6 - 11.0 K/uL    RBC 2.40 (L) 3.80 - 5.20 M/uL    HGB 7.7 (L) 11.5 - 16.0 g/dL    HCT 23.8 (L) 35.0 - 47.0 %    MCV 99.2 (H) 80.0 - 99.0 FL    MCH 32.1 26.0 - 34.0 PG    MCHC 32.4 30.0 - 36.5 g/dL    RDW 14.7 (H) 11.5 - 14.5 %    PLATELET 655 896 - 899 K/uL    MPV 8.9 8.9 - 12.9 FL    NRBC 0.0 0  WBC    ABSOLUTE NRBC 0.00 0.00 - 0.01 K/uL    NEUTROPHILS 64 32 - 75 %    LYMPHOCYTES 20 12 - 49 %    MONOCYTES 9 5 - 13 %    EOSINOPHILS 6 0 - 7 %    BASOPHILS 1 0 - 1 %    IMMATURE GRANULOCYTES 0 0.0 - 0.5 %    ABS. NEUTROPHILS 3.1 1.8 - 8.0 K/UL    ABS. LYMPHOCYTES 0.9 0.8 - 3.5 K/UL    ABS. MONOCYTES 0.4 0.0 - 1.0 K/UL    ABS. EOSINOPHILS 0.3 0.0 - 0.4 K/UL    ABS. BASOPHILS 0.0 0.0 - 0.1 K/UL    ABS. IMM. GRANS. 0.0 0.00 - 0.04 K/UL    DF AUTOMATED     METABOLIC PANEL, COMPREHENSIVE    Collection Time: 12/15/21  3:05 AM   Result Value Ref Range    Sodium 138 136 - 145 mmol/L    Potassium 4.3 3.5 - 5.1 mmol/L    Chloride 107 97 - 108 mmol/L    CO2 24 21 - 32 mmol/L    Anion gap 7 5 - 15 mmol/L    Glucose 101 (H) 65 - 100 mg/dL    BUN 36 (H) 6 - 20 MG/DL    Creatinine 1.54 (H) 0.55 - 1.02 MG/DL    BUN/Creatinine ratio 23 (H) 12 - 20      GFR est AA 39 (L) >60 ml/min/1.73m2    GFR est non-AA 32 (L) >60 ml/min/1.73m2    Calcium 8.6 8.5 - 10.1 MG/DL    Bilirubin, total 0.5 0.2 - 1.0 MG/DL    ALT (SGPT) 8 (L) 12 - 78 U/L    AST (SGOT) 13 (L) 15 - 37 U/L    Alk. phosphatase 81 45 - 117 U/L    Protein, total 5.3 (L) 6.4 - 8.2 g/dL    Albumin 3.0 (L) 3.5 - 5.0 g/dL    Globulin 2.3 2.0 - 4.0 g/dL    A-G Ratio 1.3 1.1 - 2.2           Assessment / Plan :     Anemia  H/o duodenal ulcer (NSAID related)   We are asked to see Mrs. Frazier for acute on chronic anemia with downtrending hemoglobin since admission. The patient is not a reliable historian and per nursing, she has not had any melena or hematochezia. She does have history of duodenal bulb ulcer actively bleeding at time of the of EGD in 2014 although this was likely related to frequent NSAID use. It appears she has been on PPI since this time. She is on chronic anticoagulation with Eliquis  Despite no obvious signs of active bleeding, given her history will consider EGD for evaluation of recurrent ulcer exacerbated by anticoagulation. Would be most ideal patient off Eliquis for 2-3 days prior to the procedure. Labs today - hgb 7.7. per nursing has not had BM.      - Plan for EGD/Colon Friday (12/17)  - Hold Eliquis  - continue on clear liquid diet to facilitate optimal bowel prep  - NPO after midnight (12/17)  - Start bowel prep tomorrow at 1500   - Continue to monitor hemoglobin, transfuse for< 7  - Continue PPI  LM for sandy [place] re plan    ATTENDING ADDENDUM OF TABBY NOTE:  I saw and evaluated Karl Chatterjee on the above date of service and discussed the care with the nurse practitioner. I agree with the findings and plan as documented in the note above and have edited it to reflect my findings and plan.          Patient Active Hospital Problem List:   Active Problems:    Elevated troponin (6/3/2014)      CAP (community acquired pneumonia) (12/10/2021)      Acute on chronic respiratory failure with hypoxia ([facility] (12/10/2021)      Yuri Mendez NP

## 2021-12-15 NOTE — PROGRESS NOTES
Problem: Falls - Risk of  Goal: *Absence of Falls  Description: Document Corbett Reason Fall Risk and appropriate interventions in the flowsheet. Outcome: Progressing Towards Goal  Note: Fall Risk Interventions:  Mobility Interventions: Bed/chair exit alarm, Communicate number of staff needed for ambulation/transfer, Patient to call before getting OOB, Utilize walker, cane, or other assistive device, Utilize gait belt for transfers/ambulation    Mentation Interventions: Bed/chair exit alarm, Adequate sleep, hydration, pain control, More frequent rounding, Reorient patient, Door open when patient unattended    Medication Interventions: Bed/chair exit alarm, Patient to call before getting OOB, Teach patient to arise slowly    Elimination Interventions: Bed/chair exit alarm, Call light in reach, Patient to call for help with toileting needs              Problem: Patient Education: Go to Patient Education Activity  Goal: Patient/Family Education  Outcome: Progressing Towards Goal     Problem: Pressure Injury - Risk of  Goal: *Prevention of pressure injury  Description: Document Gregorio Scale and appropriate interventions in the flowsheet.   Outcome: Progressing Towards Goal  Note: Pressure Injury Interventions:  Sensory Interventions: Assess changes in LOC, Assess need for specialty bed, Check visual cues for pain    Moisture Interventions: Check for incontinence Q2 hours and as needed    Activity Interventions: Pressure redistribution bed/mattress(bed type), PT/OT evaluation    Mobility Interventions: Pressure redistribution bed/mattress (bed type)    Nutrition Interventions: Offer support with meals,snacks and hydration    Friction and Shear Interventions: Transfer aides:transfer board/Sebastien lift/ceiling lift                Problem: Patient Education: Go to Patient Education Activity  Goal: Patient/Family Education  Outcome: Progressing Towards Goal     Problem: Risk for Spread of Infection  Goal: Prevent transmission of infectious organism to others  Description: Prevent the transmission of infectious organisms to other patients, staff members, and visitors.   Outcome: Progressing Towards Goal     Problem: Patient Education:  Go to Education Activity  Goal: Patient/Family Education  Outcome: Progressing Towards Goal

## 2021-12-15 NOTE — PROGRESS NOTES
Problem: Self Care Deficits Care Plan (Adult)  Goal: *Acute Goals and Plan of Care (Insert Text)  Description: Description: FUNCTIONAL STATUS PRIOR TO ADMISSION: The patient was functional at the wheelchair level and was modified independent for transfers to the wheelchair per pt report. Pt denies any falls in the last year. HOME SUPPORT PRIOR TO ADMISSION: The patient lived in [facility] due to her apartment lease ending in November 2021 and is looking for new living arrangements. Pt reports she has a niece in the area that assist her. Occupational Therapy Goals  Initiated 12/13/2021  1. Patient will perform bathing with minimal assistance/contact guard assist within 7 day(s). 2.  Patient will perform lower body dressing with moderate assistance  within 7 day(s). 3.  Patient will perform upper body dressing with supervision/set-up within 7 day(s). 4.  Patient will perform toilet transfers with minimal assistance/contact guard assist within 7 day(s). 5.  Patient will perform all aspects of toileting with minimal assistance/contact guard assist within 7 day(s). 6.  Patient will participate in upper extremity therapeutic exercise/activities with minimal assistance/contact guard assist for 15 minutes within 7 day(s). 7.  Patient will utilize energy conservation techniques during functional activities with verbal cues within 7 day(s). Outcome: Progressing Towards Goal   OCCUPATIONAL THERAPY TREATMENT  Patient: Joseph Turner (89 y.o. female)  Date: 12/15/2021  Diagnosis: Acute on chronic respiratory failure with hypoxia (HCC) [J96.21]  Elevated troponin [R77.8]  CAP (community acquired pneumonia) [J18.9] <principal problem not specified>  Procedure(s) (LRB):  ESOPHAGOGASTRODUODENOSCOPY (EGD) (N/A)  COLONOSCOPY   :- (N/A)    Precautions: Contact  Chart, occupational therapy assessment, plan of care, and goals were reviewed.     ASSESSMENT  Patient continues with skilled OT services and is progressing towards goals. Pt received supine in bed. Nursing present in the room. Pt seen in OT for bed mobility, bathing, dressing and transfer training. Vitals stable, removed O2, RA 92-93%. Pt presents at mod assist level with bed mobility and good sit balance noted seated on EOB. Bathing seated on EOB, min assist with UB and max to total assist with LB. Performed sit to stand x 1 using RW, continues to present with slight posterior lean. Increase verbal/tactile cues for optimal positioning. Transferred to chair with min assist.  Set-up with grooming. Pt continues to remain 93% on RA. Overall, pt continues to improve with overall mobility to assist with self-care tasks. Continue to recommend further therapy at discharge. Will con't to follow. Current Level of Function Impacting Discharge (ADLs): see above    Other factors to consider for discharge: none noted         PLAN :  Patient continues to benefit from skilled intervention to address the above impairments. Continue treatment per established plan of care to address goals. Recommend with staff:     Recommend next OT session: see goals    Recommendation for discharge: (in order for the patient to meet his/her long term goals)  Therapy up to 5 days/week in SNF setting    This discharge recommendation:  A follow-up discussion with the attending provider and/or case management is planned    IF patient discharges home will need the following DME: none       SUBJECTIVE:   Patient stated I went to [facility] for HS.    OBJECTIVE DATA SUMMARY:   Cognitive/Behavioral Status:  Neurologic State: Alert  Orientation Level: Oriented to person;Oriented to place  Cognition: Follows commands        Safety/Judgement: Awareness of environment    Functional Mobility and Transfers for ADLs:  Bed Mobility:  Supine to Sit: Moderate assistance;Assist x1    Transfers:  Sit to Stand:  Moderate assistance;Assist x1     Bed to Chair: Moderate assistance;Assist x1    Balance:  Sitting: Intact  Standing: Impaired; With support (posterior lean)    ADL Intervention:       Grooming  Grooming Assistance: Set-up  Washing Face: Set-up  Brushing Teeth: Set-up    Upper Body Bathing  Bathing Assistance: Minimum assistance  Position Performed: Seated edge of bed    Lower Body Bathing  Perineal  : Total assistance (dependent)  Adaptive Equipment: Walker  Position Performed: Standing    Upper Body [address] Gown: Minimum  assistance    Lower Body Dressing Assistance  Protective Undergarmet: Total assistance (dependent)  Position Performed: Standing    Toileting  Bowel Hygiene: Total assistance (dependent)  Clothing Management: Total assistance (dependent)  Adaptive Equipment: Walker    Cognitive Retraining  Safety/Judgement: Awareness of environment        Pain:  Knee pain, no verbal score given    Activity Tolerance:   Good    After treatment patient left in no apparent distress:   Sitting in chair, Call bell within reach, and Bed / chair alarm activated    COMMUNICATION/COLLABORATION:   The patients plan of care was discussed with: Registered nurse.      Lisa Hercules OTR/L  Time Calculation: 25 mins

## 2021-12-15 NOTE — PROGRESS NOTES
Transition of Care Plan   RUR- 23%    DISPOSITION: The disposition plan is [facility] SNF; pending medical progression  o Insurance [id number]; Next Review Date: 12/15     F/U with PCP/Specialist     Transport:  [facility]   Barrier: EGD and Colonoscopy planned for Friday; per MD the pt will discharge on Friday pending medical stability              CM: [address] [place],   [phone]

## 2021-12-16 LAB
COMMENT, HOLDF: NORMAL
SAMPLES BEING HELD,HOLD: NORMAL

## 2021-12-16 PROCEDURE — 74011250637 HC RX REV CODE- 250/637: Performed by: FAMILY MEDICINE

## 2021-12-16 PROCEDURE — 65660000000 HC RM CCU STEPDOWN

## 2021-12-16 PROCEDURE — 97535 SELF CARE MNGMENT TRAINING: CPT

## 2021-12-16 PROCEDURE — 74011250637 HC RX REV CODE- 250/637: Performed by: INTERNAL MEDICINE

## 2021-12-16 PROCEDURE — 74011000250 HC RX REV CODE- 250: Performed by: NURSE PRACTITIONER

## 2021-12-16 RX ADMIN — BUSPIRONE HYDROCHLORIDE 5 MG: 5 TABLET ORAL at 09:25

## 2021-12-16 RX ADMIN — PANTOPRAZOLE SODIUM 40 MG: 40 TABLET, DELAYED RELEASE ORAL at 07:00

## 2021-12-16 RX ADMIN — HYDRALAZINE HYDROCHLORIDE 50 MG: 50 TABLET, FILM COATED ORAL at 09:25

## 2021-12-16 RX ADMIN — ATORVASTATIN CALCIUM 40 MG: 40 TABLET, FILM COATED ORAL at 21:52

## 2021-12-16 RX ADMIN — CARBIDOPA AND LEVODOPA 2 TABLET: 25; 100 TABLET ORAL at 09:25

## 2021-12-16 RX ADMIN — CARBIDOPA AND LEVODOPA 2 TABLET: 25; 100 TABLET ORAL at 13:12

## 2021-12-16 RX ADMIN — Medication 6 MG: at 21:52

## 2021-12-16 RX ADMIN — CARVEDILOL 3.12 MG: 3.12 TABLET, FILM COATED ORAL at 16:11

## 2021-12-16 RX ADMIN — POLYETHYLENE GLYCOL-3350 AND ELECTROLYTES 4000 ML: 236; 6.74; 5.86; 2.97; 22.74 POWDER, FOR SOLUTION ORAL at 16:11

## 2021-12-16 RX ADMIN — CARBIDOPA AND LEVODOPA 2 TABLET: 25; 100 TABLET ORAL at 16:11

## 2021-12-16 RX ADMIN — BUSPIRONE HYDROCHLORIDE 5 MG: 5 TABLET ORAL at 16:11

## 2021-12-16 RX ADMIN — BUSPIRONE HYDROCHLORIDE 5 MG: 5 TABLET ORAL at 21:52

## 2021-12-16 RX ADMIN — GABAPENTIN 100 MG: 100 CAPSULE ORAL at 21:52

## 2021-12-16 RX ADMIN — ARIPIPRAZOLE 20 MG: 5 TABLET ORAL at 09:25

## 2021-12-16 RX ADMIN — DULOXETINE 120 MG: 60 CAPSULE, DELAYED RELEASE ORAL at 09:25

## 2021-12-16 RX ADMIN — HYDRALAZINE HYDROCHLORIDE 50 MG: 50 TABLET, FILM COATED ORAL at 16:11

## 2021-12-16 RX ADMIN — Medication 10 ML: at 07:00

## 2021-12-16 RX ADMIN — HYDRALAZINE HYDROCHLORIDE 50 MG: 50 TABLET, FILM COATED ORAL at 21:52

## 2021-12-16 RX ADMIN — CARBIDOPA AND LEVODOPA 2 TABLET: 25; 100 TABLET ORAL at 21:52

## 2021-12-16 RX ADMIN — CARVEDILOL 3.12 MG: 3.12 TABLET, FILM COATED ORAL at 08:25

## 2021-12-16 RX ADMIN — AMLODIPINE BESYLATE 2.5 MG: 5 TABLET ORAL at 09:26

## 2021-12-16 NOTE — PROGRESS NOTES
118 [facility].  217 [facility] [address], [address]  [phone]                     GI PROGRESS NOTE    Patient Name: Anushka Ponce      : 1940      MRN: [id number]  Admit Date: 12/10/2021  Today's Date: 2021    Subjective:     Patient completed entire prep by 1130 last night output is watery but still brown. Objective:     Blood pressure (!) 170/68, pulse (!) 59, temperature 98.2 °F (36.8 °C), resp. rate 18, weight 75.8 kg (167 lb 1.7 oz), SpO2 97 %. Physical Exam:  General appearance: cooperative, no distress  Skin: Pale No jaundice,  rashes   HEENT: Sclerae anicteric. Cardiovascular: Regular rate and rhythm. [place] Respiratory: Comfortable breathing . Clear breath sounds   GI: Abdomen nondistended, soft, and nontender. Rectal: Deferred   Musculoskeletal: No pitting edema of the lower legs. Neurological: Awake pleasant conversable. Memory deficit  - limited historian  PsychiatricNo anxiety or agitation      Data Review:    Recent Results (from the past 24 hour(s))   SAMPLES BEING HELD    Collection Time: 21  4:18 AM   Result Value Ref Range    SAMPLES BEING HELD 1lav,1gold     COMMENT        Add-on orders for these samples will be processed based on acceptable specimen integrity and analyte stability, which may vary by analyte. Assessment / Plan :     Anemia  H/o duodenal ulcer (NSAID related)   We are asked to see Mrs. Frazier for acute on chronic anemia with downtrending hemoglobin since admission. The patient is not a reliable historian and per nursing, she has not had any melena or hematochezia. She does have history of duodenal bulb ulcer actively bleeding at time of the of EGD in   likely related to frequent NSAID use. It appears she has been on PPI since this time.   She is on chronic anticoagulation with Eliquis  Despite no obvious signs of active bleeding, given her history will consider EGD for evaluation of recurrent ulcer exacerbated by anticoagulation. Would be most ideal patient off Eliquis for 2-3 days prior to the procedure.       We will proceed with colonoscopy and upper endoscopy  Hgb 8.5; 7.7 (12/15)    - EGD/Colon today  - Hold Eliquis  - NPO   - Continue to monitor hemoglobin, transfuse for< 7  - Continue PPI       Patient Active Hospital Problem List:   Active Problems:    Elevated troponin (6/3/2014)      CAP (community acquired pneumonia) (12/10/2021)      Acute on chronic respiratory failure with hypoxia ([facility] [address] (12/10/2021)

## 2021-12-16 NOTE — PROGRESS NOTES
Hospitalist Progress Note          Junior Britta MD  Please call  and page for questions. Call physician on-call through the  7pm-7am    Daily Progress Note: 12/16/2021    Primary care provider:Godfrey Arce DO    Date of admission: 12/10/2021  3:28 AM    Admission summery and hospital course:  HPI: 81 y.o. 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   Per record, she was picked up from [facility] where she was found to be hypoxic to the fifties on her normal 2 L nasal cannula.    She was recently admitted from 10/29 to 11/3 for sepsis 2/2 proteus UTI.   In the ED, SBP was elevated to one nineties.  Per record, SPO2 is 100% on BiPAP.  Labs were significant for WBC 15.7, creatinine 1.94, CO2 20, troponin 2,017, and probnp 18,236.  Rapid covid is negative. Subjective:   Patient denied any acute issues today. 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 Assessment/Plan:   Acute on Chronic Hypoxic Respiratory Failure  Pulmonary edema  Left lower lobe airspace disease with effusion  Leukocytosis resolved, afebrile.     Continue supplemental oxygen.  Patient received Lasix 80 mg IV on admission.    Continue to monitor I&O, daily weight.    Completed 5 days azithromycin and ceftriaxone.    Follow cultures, strep pneumonia antigen was negative.    CRP was 0.82, rapid Covid 19 test was negative, Legionella pneumonia antigen was negative RSV by PCR was negative and influenza A is negative.        Anemia,  acute on chronic  History of iron deficiency anemia. Appreciated GI input, patient will have colonoscopy and EGD on 12/17.   Monitor CBC, H&H stable now, hemodynamically stable.     Hypertensive Emergency  Elevated Troponin  Asymptomatic, EKG with no hyperacute changes. Likely demand from htn, pna, and HF. Troponin is trending down towards normal.  Blood pressure is elevated now but overall reasonable, continue carvedilol, hydralazine and Norvasc. Hydralazine IV as necessary.     Dementia and mood disorder  Patient knows she is at the hospital, she is oriented to her , month and year. Probably at the baseline.    Continue outpatient melatonin, cymbalta, and buspar.     Parkinsons: continue home sinemet.   Gastroesophageal reflux disease: continue pantoprazole.     Paroxysmal atrial fibrillation; Rate controlled with carvedilol. Hold apixaban for scopes at a.m.       See orders for other plans. VTE prophylaxis: Patient is not apixaban. Code status: DNR  Discussed plan of care with Patient/Family and Nurse. MPOA: French Landau listed as relative. Discharge planning:   Possibly to her facility Friday p.m. Saturday a.m if she is a stable. Review of Systems:     Review of Systems:  Symptom  Y/N  Comments   Symptom  Y/N  Comments    Fever/Chills   x   Chest Pain  x    Poor Appetite  x    Edema  x     Cough  x   Abdominal Pain  x     Sputum  x   Joint Pain  x    SOB/SALAMANCA  x   Pruritis/Rash      Nausea/vomit  x   Tolerating PT/OT      Diarrhea     Tolerating Diet      Constipation     Other      Could not obtain due to:         Objective:   Physical Exam:     Visit Vitals  BP (!) 143/64 (BP 1 Location: Right upper arm, BP Patient Position: Sitting)   Pulse (!) 57   Temp 97.5 °F (36.4 °C)   Resp 16   Wt 75.8 kg (167 lb 1.7 oz)   SpO2 95%   BMI 27.81 kg/m²    O2 Flow Rate (L/min): 2 l/min O2 Device: Nasal cannula    Temp (24hrs), Av.9 °F (36.6 °C), Min:97.5 °F (36.4 °C), Max:98.2 °F (36.8 °C)    No intake/output data recorded. No intake/output data recorded.     General:  Alert, cooperative, no distress, appears stated age.  Patient is comfortably resting in chair. Lungs:    Clear to auscultation bilaterally.     Chest wall:  No tenderness or deformity. Heart:  Regular rate and rhythm, S1, S2 normal, no murmur. Abdomen:   Soft, non-tender. Bowel sounds normal.    Extremities: Extremities normal, atraumatic, no cyanosis or edema. Neurologic:  Patient has clear voice.  She follows command.          Data Review:       Recent Days:  Recent Labs     12/15/21  0305 12/14/21  0440   WBC 4.7 5.5   HGB 7.7* 7.4*   HCT 23.8* 23.2*    244     Recent Labs     12/15/21  0305 12/14/21  0440    139   K 4.3 4.1    107   CO2 24 22   * 106*   BUN 36* 39*   CREA 1.54* 1.55*   CA 8.6 8.2*   ALB 3.0* 3.0*   ALT 8* 8*     No results for input(s): PH, PCO2, PO2, HCO3, FIO2 in the last 72 hours. 24 Hour Results:  Recent Results (from the past 24 hour(s))   SAMPLES BEING HELD    Collection Time: 12/16/21  4:18 AM   Result Value Ref Range    SAMPLES BEING HELD 1lav,1gold     COMMENT        Add-on orders for these samples will be processed based on acceptable specimen integrity and analyte stability, which may vary by analyte.        Problem List:  Problem List as of 12/16/2021 Date Reviewed: 8/26/2021          Codes Class Noted - Resolved    CAP (community acquired pneumonia) ICD-10-CM: J18.9  ICD-9-CM: 486  12/10/2021 - Present        Acute on chronic respiratory failure with hypoxia ([facility] [address] ICD-10-CM: J96.21  ICD-9-CM: 518.84, 799.02  12/10/2021 - Present        Cellulitis and abscess of lower extremity ICD-10-CM: L03.119, L02.419  ICD-9-CM: 682.6  10/29/2021 - Present        SOB (shortness of breath) ICD-10-CM: R06.02  ICD-9-CM: 786.05  4/23/2021 - Present        UTI (urinary tract infection) ICD-10-CM: N39.0  ICD-9-CM: 599.0  4/9/2021 - Present        CHF exacerbation ([facility] [address] ICD-10-CM: I50.9  ICD-9-CM: 428.0  9/25/2020 - Present        Weakness ICD-10-CM: R53.1  ICD-9-CM: 780.79  5/4/2020 - Present        Generalized weakness ICD-10-CM: R53.1  ICD-9-CM: 780.79  4/30/2020 - Present        Carotid artery stenosis, symptomatic, left ICD-10-CM: T84.05  ICD-9-CM: 433.10  7/26/2019 - Present        HTN (hypertension) ICD-10-CM: I10  ICD-9-CM: 401.9  7/17/2019 - Present        Acute ischemic stroke [facility]) ICD-10-CM: I63.9  ICD-9-CM: 434.91  7/12/2019 - Present        Fall ICD-10-CM: W19. Flavio Kaminski  ICD-9-CM: E888.9  12/24/2018 - Present        CKD (chronic kidney disease), stage III ([facility] ICD-10-CM: N18.30  ICD-9-CM: 585.3  7/8/2015 - Present        Edema ICD-10-CM: R60.9  ICD-9-CM: 782.3  5/6/2015 - Present        Diabetes mellitus ([facility] ICD-10-CM: E11.9  ICD-9-CM: 250.00  5/6/2015 - Present        Postoperative anemia due to acute blood loss ICD-10-CM: D62  ICD-9-CM: 285.1  2/14/2015 - Present        S/P CABG (coronary artery bypass graft) ICD-10-CM: Z95.1  ICD-9-CM: V45.81  2/13/2015 - Present        Syncope ICD-10-CM: R55  ICD-9-CM: 780.2  2/9/2015 - Present        Bradycardia ICD-10-CM: R00.1  ICD-9-CM: 427.89  2/9/2015 - Present        Acute kidney injury ([facility] ICD-10-CM: N17.9  ICD-9-CM: 584.9  2/9/2015 - Present        Anemia ICD-10-CM: D64.9  ICD-9-CM: 285.9  9/9/2014 - Present        GI bleed ICD-10-CM: K92.2  ICD-9-CM: 578.9  9/9/2014 - Present        AF (atrial fibrillation) (HCC) ICD-10-CM: I48.91  ICD-9-CM: 427.31  6/20/2014 - Present        Hypotension ICD-10-CM: I95.9  ICD-9-CM: 458.9  6/3/2014 - Present        Coronary artery disease ICD-10-CM: I25.10  ICD-9-CM: 414.00  6/3/2014 - Present    Overview Signed 2/10/2015 11:06 AM by Tay Chapman     2007 PCI x2 to LAD with STEPHAN             S/P coronary artery stent placement ICD-10-CM: Z95.5  ICD-9-CM: V45.82  6/3/2014 - Present        Renal failure ICD-10-CM: N19  ICD-9-CM: 030  6/3/2014 - Present        Elevated troponin ICD-10-CM: R77.8  ICD-9-CM: 790.6  6/3/2014 - Present        Pericardial effusion ICD-10-CM: I31.3  ICD-9-CM: 423.9  6/3/2014 - Present        Lactic acidosis ICD-10-CM: E87.2  ICD-9-CM: 276.2  6/3/2014 - Present        RESOLVED: CHF (congestive heart failure) (HCC) ICD-10-CM: I50.9  ICD-9-CM: 428.0  9/22/2020 - 8/3/2021              Medications reviewed  Current Facility-Administered Medications   Medication Dose Route Frequency    peg 3350-electrolytes (COLYTE) 4000 mL  4,000 mL Oral ONCE    ARIPiprazole (ABILIFY) tablet 20 mg  20 mg Oral DAILY    hydrALAZINE (APRESOLINE) tablet 50 mg  50 mg Oral TID    hydrALAZINE (APRESOLINE) 20 mg/mL injection 10 mg  10 mg IntraVENous Q6H PRN    furosemide (LASIX) injection 40 mg  40 mg IntraVENous Q MON, WED & SAT    sodium chloride (NS) flush 5-40 mL  5-40 mL IntraVENous Q8H    sodium chloride (NS) flush 5-40 mL  5-40 mL IntraVENous PRN    acetaminophen (TYLENOL) tablet 650 mg  650 mg Oral Q6H PRN    Or    acetaminophen (TYLENOL) suppository 650 mg  650 mg Rectal Q6H PRN    polyethylene glycol (MIRALAX) packet 17 g  17 g Oral DAILY PRN    ondansetron (ZOFRAN ODT) tablet 4 mg  4 mg Oral Q8H PRN    Or    ondansetron (ZOFRAN) injection 4 mg  4 mg IntraVENous Q6H PRN    [Held by provider] apixaban (ELIQUIS) tablet 2.5 mg  2.5 mg Oral BID    amLODIPine (NORVASC) tablet 2.5 mg  2.5 mg Oral DAILY    atorvastatin (LIPITOR) tablet 40 mg  40 mg Oral QHS    busPIRone (BUSPAR) tablet 5 mg  5 mg Oral TID    carbidopa-levodopa (SINEMET)  mg per tablet 2 Tablet  2 Tablet Oral QID    carvediloL (COREG) tablet 3.125 mg  3.125 mg Oral BID WITH MEALS    DULoxetine (CYMBALTA) capsule 120 mg  120 mg Oral DAILY    gabapentin (NEURONTIN) capsule 100 mg  100 mg Oral QHS    melatonin tablet 6 mg  6 mg Oral QHS    pantoprazole (PROTONIX) tablet 40 mg  40 mg Oral ACB       Care Plan discussed with: Patient/Family, Nurse and     Total time spent with patient: 30 minutes.     Real Amaya MD

## 2021-12-16 NOTE — PROGRESS NOTES
Problem: Self Care Deficits Care Plan (Adult)  Goal: *Acute Goals and Plan of Care (Insert Text)  Description: Description: FUNCTIONAL STATUS PRIOR TO ADMISSION: The patient was functional at the wheelchair level and was modified independent for transfers to the wheelchair per pt report. Pt denies any falls in the last year. HOME SUPPORT PRIOR TO ADMISSION: The patient lived in [facility] due to her apartment lease ending in November 2021 and is looking for new living arrangements. Pt reports she has a niece in the area that assist her. Occupational Therapy Goals  Initiated 12/13/2021  1. Patient will perform bathing with minimal assistance/contact guard assist within 7 day(s). 2.  Patient will perform lower body dressing with moderate assistance  within 7 day(s). 3.  Patient will perform upper body dressing with supervision/set-up within 7 day(s). 4.  Patient will perform toilet transfers with minimal assistance/contact guard assist within 7 day(s). 5.  Patient will perform all aspects of toileting with minimal assistance/contact guard assist within 7 day(s). 6.  Patient will participate in upper extremity therapeutic exercise/activities with minimal assistance/contact guard assist for 15 minutes within 7 day(s). 7.  Patient will utilize energy conservation techniques during functional activities with verbal cues within 7 day(s). Outcome: Progressing Towards Goal   OCCUPATIONAL THERAPY TREATMENT  Patient: Adi Bean (80 y.o. female)  Date: 12/16/2021  Diagnosis: Acute on chronic respiratory failure with hypoxia (HCC) [J96.21]  Elevated troponin [R77.8]  CAP (community acquired pneumonia) [J18.9] <principal problem not specified>  Procedure(s) (LRB):  ESOPHAGOGASTRODUODENOSCOPY (EGD) (N/A)  COLONOSCOPY   :- (N/A)    Precautions: Contact  Chart, occupational therapy assessment, plan of care, and goals were reviewed.     ASSESSMENT  Patient continues with skilled OT services and is progressing towards goals. Pt received supine in bed, lunch arrived and motivated to sit in chair. Pt needed depends changed. Sit to stand at min to mod assist x 1 using RW. Total assist with cl mgnt and bowel hygiene d/t slight posterior lean. Min assist x 1 to access chair, verbal cues for safety d/t pt wanting to sit down before having both legs touch the chair. Overall, pt continues to demonstrate improved mobility, however, continues to need increase assistance with toileting. Will con't to follow. Current Level of Function Impacting Discharge (ADLs): min to mod assist with mobility and total assist with toileting    Other factors to consider for discharge:          PLAN :  Patient continues to benefit from skilled intervention to address the above impairments. Continue treatment per established plan of care to address goals. Recommend with staff:     Recommend next OT session: see goals    Recommendation for discharge: (in order for the patient to meet his/her long term goals)  Therapy up to 5 days/week in SNF setting    This discharge recommendation:  A follow-up discussion with the attending provider and/or case management is planned    IF patient discharges home will need the following DME: none       SUBJECTIVE:   Patient stated That would be great.     OBJECTIVE DATA SUMMARY:   Cognitive/Behavioral Status:  Neurologic State: Alert  Orientation Level: Oriented to person;Oriented to place;Oriented to situation;Disoriented to situation  Cognition: Follows commands        Safety/Judgement: Awareness of environment    Functional Mobility and Transfers for ADLs:  Bed Mobility:  Supine to Sit: Minimum assistance    Transfers:  Sit to Stand: Minimum assistance; Moderate assistance;Assist x1     Bed to Chair: Minimum assistance;Assist x1    Balance:  Sitting: Intact  Standing: Impaired; With support    ADL Intervention:  Feeding  Feeding Assistance: Set-up              Lower Body Bathing  Perineal  : Total assistance (dependent)  Position Performed: Standing         Lower Body Dressing Assistance  Protective Undergarmet: Total assistance (dependent)    Toileting  Toileting Assistance: Total assistance(dependent)  Adaptive Equipment: Walker    Cognitive Retraining  Safety/Judgement: Awareness of environment      Pain:  No c/o of pain    Activity Tolerance:   Good    After treatment patient left in no apparent distress:   Sitting in chair, Call bell within reach, and Bed / chair alarm activated    COMMUNICATION/COLLABORATION:   The patients plan of care was discussed with: Registered nurse.      CLEVELAND Dickson/L  Time Calculation: 27 mins

## 2021-12-17 ENCOUNTER — ANESTHESIA EVENT (OUTPATIENT)
Dept: ENDOSCOPY | Age: 81
DRG: 193 | End: 2021-12-17
Payer: MEDICARE

## 2021-12-17 ENCOUNTER — ANESTHESIA (OUTPATIENT)
Dept: ENDOSCOPY | Age: 81
DRG: 193 | End: 2021-12-17
Payer: MEDICARE

## 2021-12-17 LAB
ALBUMIN SERPL-MCNC: 3.2 G/DL (ref 3.5–5)
ALBUMIN/GLOB SERPL: 1.3 {RATIO} (ref 1.1–2.2)
ALP SERPL-CCNC: 85 U/L (ref 45–117)
ALT SERPL-CCNC: 7 U/L (ref 12–78)
ANION GAP SERPL CALC-SCNC: 6 MMOL/L (ref 5–15)
AST SERPL-CCNC: 20 U/L (ref 15–37)
BASOPHILS # BLD: 0 K/UL (ref 0–0.1)
BASOPHILS NFR BLD: 1 % (ref 0–1)
BILIRUB SERPL-MCNC: 0.5 MG/DL (ref 0.2–1)
BUN SERPL-MCNC: 24 MG/DL (ref 6–20)
BUN/CREAT SERPL: 19 (ref 12–20)
CALCIUM SERPL-MCNC: 8.9 MG/DL (ref 8.5–10.1)
CHLORIDE SERPL-SCNC: 107 MMOL/L (ref 97–108)
CO2 SERPL-SCNC: 27 MMOL/L (ref 21–32)
CREAT SERPL-MCNC: 1.26 MG/DL (ref 0.55–1.02)
DIFFERENTIAL METHOD BLD: ABNORMAL
EOSINOPHIL # BLD: 0.2 K/UL (ref 0–0.4)
EOSINOPHIL NFR BLD: 4 % (ref 0–7)
ERYTHROCYTE [DISTWIDTH] IN BLOOD BY AUTOMATED COUNT: 14.6 % (ref 11.5–14.5)
GLOBULIN SER CALC-MCNC: 2.5 G/DL (ref 2–4)
GLUCOSE SERPL-MCNC: 110 MG/DL (ref 65–100)
HCT VFR BLD AUTO: 26.6 % (ref 35–47)
HGB BLD-MCNC: 8.5 G/DL (ref 11.5–16)
IMM GRANULOCYTES # BLD AUTO: 0 K/UL (ref 0–0.04)
IMM GRANULOCYTES NFR BLD AUTO: 0 % (ref 0–0.5)
LYMPHOCYTES # BLD: 0.9 K/UL (ref 0.8–3.5)
LYMPHOCYTES NFR BLD: 17 % (ref 12–49)
MCH RBC QN AUTO: 31.5 PG (ref 26–34)
MCHC RBC AUTO-ENTMCNC: 32 G/DL (ref 30–36.5)
MCV RBC AUTO: 98.5 FL (ref 80–99)
MONOCYTES # BLD: 0.5 K/UL (ref 0–1)
MONOCYTES NFR BLD: 10 % (ref 5–13)
NEUTS SEG # BLD: 3.5 K/UL (ref 1.8–8)
NEUTS SEG NFR BLD: 68 % (ref 32–75)
NRBC # BLD: 0 K/UL (ref 0–0.01)
NRBC BLD-RTO: 0 PER 100 WBC
PLATELET # BLD AUTO: 303 K/UL (ref 150–400)
PMV BLD AUTO: 8.9 FL (ref 8.9–12.9)
POTASSIUM SERPL-SCNC: 3.6 MMOL/L (ref 3.5–5.1)
PROT SERPL-MCNC: 5.7 G/DL (ref 6.4–8.2)
RBC # BLD AUTO: 2.7 M/UL (ref 3.8–5.2)
SODIUM SERPL-SCNC: 140 MMOL/L (ref 136–145)
WBC # BLD AUTO: 5.1 K/UL (ref 3.6–11)

## 2021-12-17 PROCEDURE — 74011250637 HC RX REV CODE- 250/637: Performed by: FAMILY MEDICINE

## 2021-12-17 PROCEDURE — 77030039825 HC MSK NSL PAP SUPERNO2VA VYRM -B: Performed by: NURSE ANESTHETIST, CERTIFIED REGISTERED

## 2021-12-17 PROCEDURE — 2709999900 HC NON-CHARGEABLE SUPPLY: Performed by: INTERNAL MEDICINE

## 2021-12-17 PROCEDURE — 74011250636 HC RX REV CODE- 250/636: Performed by: NURSE ANESTHETIST, CERTIFIED REGISTERED

## 2021-12-17 PROCEDURE — 88305 TISSUE EXAM BY PATHOLOGIST: CPT

## 2021-12-17 PROCEDURE — 85025 COMPLETE CBC W/AUTO DIFF WBC: CPT

## 2021-12-17 PROCEDURE — 74011250637 HC RX REV CODE- 250/637: Performed by: INTERNAL MEDICINE

## 2021-12-17 PROCEDURE — 0DJ08ZZ INSPECTION OF UPPER INTESTINAL TRACT, VIA NATURAL OR ARTIFICIAL OPENING ENDOSCOPIC: ICD-10-PCS | Performed by: INTERNAL MEDICINE

## 2021-12-17 PROCEDURE — 76040000019: Performed by: INTERNAL MEDICINE

## 2021-12-17 PROCEDURE — 76060000031 HC ANESTHESIA FIRST 0.5 HR: Performed by: INTERNAL MEDICINE

## 2021-12-17 PROCEDURE — 80053 COMPREHEN METABOLIC PANEL: CPT

## 2021-12-17 PROCEDURE — 0DBM8ZZ EXCISION OF DESCENDING COLON, VIA NATURAL OR ARTIFICIAL OPENING ENDOSCOPIC: ICD-10-PCS | Performed by: INTERNAL MEDICINE

## 2021-12-17 PROCEDURE — 65660000000 HC RM CCU STEPDOWN

## 2021-12-17 PROCEDURE — 74011000250 HC RX REV CODE- 250: Performed by: NURSE ANESTHETIST, CERTIFIED REGISTERED

## 2021-12-17 PROCEDURE — 36415 COLL VENOUS BLD VENIPUNCTURE: CPT

## 2021-12-17 RX ORDER — NALOXONE HYDROCHLORIDE 0.4 MG/ML
0.4 INJECTION, SOLUTION INTRAMUSCULAR; INTRAVENOUS; SUBCUTANEOUS
Status: DISCONTINUED | OUTPATIENT
Start: 2021-12-17 | End: 2021-12-17 | Stop reason: HOSPADM

## 2021-12-17 RX ORDER — ATROPINE SULFATE 0.1 MG/ML
0.5 INJECTION INTRAVENOUS
Status: DISCONTINUED | OUTPATIENT
Start: 2021-12-17 | End: 2021-12-17 | Stop reason: HOSPADM

## 2021-12-17 RX ORDER — SODIUM CHLORIDE 0.9 % (FLUSH) 0.9 %
5-40 SYRINGE (ML) INJECTION EVERY 8 HOURS
Status: DISCONTINUED | OUTPATIENT
Start: 2021-12-17 | End: 2021-12-21 | Stop reason: HOSPADM

## 2021-12-17 RX ORDER — EPINEPHRINE 0.1 MG/ML
1 INJECTION INTRACARDIAC; INTRAVENOUS
Status: DISCONTINUED | OUTPATIENT
Start: 2021-12-17 | End: 2021-12-17 | Stop reason: HOSPADM

## 2021-12-17 RX ORDER — DEXTROMETHORPHAN/PSEUDOEPHED 2.5-7.5/.8
1.2 DROPS ORAL
Status: DISCONTINUED | OUTPATIENT
Start: 2021-12-17 | End: 2021-12-17 | Stop reason: HOSPADM

## 2021-12-17 RX ORDER — SODIUM CHLORIDE 0.9 % (FLUSH) 0.9 %
5-40 SYRINGE (ML) INJECTION AS NEEDED
Status: DISCONTINUED | OUTPATIENT
Start: 2021-12-17 | End: 2021-12-21 | Stop reason: HOSPADM

## 2021-12-17 RX ORDER — LIDOCAINE HYDROCHLORIDE 20 MG/ML
INJECTION, SOLUTION EPIDURAL; INFILTRATION; INTRACAUDAL; PERINEURAL AS NEEDED
Status: DISCONTINUED | OUTPATIENT
Start: 2021-12-17 | End: 2021-12-17 | Stop reason: HOSPADM

## 2021-12-17 RX ORDER — SODIUM CHLORIDE 9 MG/ML
50 INJECTION, SOLUTION INTRAVENOUS CONTINUOUS
Status: DISPENSED | OUTPATIENT
Start: 2021-12-17 | End: 2021-12-17

## 2021-12-17 RX ORDER — EPHEDRINE SULFATE/0.9% NACL/PF 50 MG/5 ML
SYRINGE (ML) INTRAVENOUS
Status: DISPENSED
Start: 2021-12-17 | End: 2021-12-17

## 2021-12-17 RX ORDER — SODIUM CHLORIDE 9 MG/ML
INJECTION, SOLUTION INTRAVENOUS
Status: DISCONTINUED | OUTPATIENT
Start: 2021-12-17 | End: 2021-12-17 | Stop reason: HOSPADM

## 2021-12-17 RX ORDER — PHENYLEPHRINE HCL IN 0.9% NACL 0.4MG/10ML
SYRINGE (ML) INTRAVENOUS AS NEEDED
Status: DISCONTINUED | OUTPATIENT
Start: 2021-12-17 | End: 2021-12-17 | Stop reason: HOSPADM

## 2021-12-17 RX ORDER — PROPOFOL 10 MG/ML
INJECTION, EMULSION INTRAVENOUS AS NEEDED
Status: DISCONTINUED | OUTPATIENT
Start: 2021-12-17 | End: 2021-12-17 | Stop reason: HOSPADM

## 2021-12-17 RX ORDER — FLUMAZENIL 0.1 MG/ML
0.2 INJECTION INTRAVENOUS
Status: DISCONTINUED | OUTPATIENT
Start: 2021-12-17 | End: 2021-12-17 | Stop reason: HOSPADM

## 2021-12-17 RX ADMIN — PROPOFOL 25 MG: 10 INJECTION, EMULSION INTRAVENOUS at 12:49

## 2021-12-17 RX ADMIN — LIDOCAINE HYDROCHLORIDE 60 MG: 20 INJECTION, SOLUTION EPIDURAL; INFILTRATION; INTRACAUDAL; PERINEURAL at 12:46

## 2021-12-17 RX ADMIN — BUSPIRONE HYDROCHLORIDE 5 MG: 5 TABLET ORAL at 21:09

## 2021-12-17 RX ADMIN — ARIPIPRAZOLE 20 MG: 5 TABLET ORAL at 09:13

## 2021-12-17 RX ADMIN — Medication 80 MCG: at 12:53

## 2021-12-17 RX ADMIN — HYDRALAZINE HYDROCHLORIDE 50 MG: 50 TABLET, FILM COATED ORAL at 17:46

## 2021-12-17 RX ADMIN — ATORVASTATIN CALCIUM 40 MG: 40 TABLET, FILM COATED ORAL at 21:09

## 2021-12-17 RX ADMIN — BUSPIRONE HYDROCHLORIDE 5 MG: 5 TABLET ORAL at 17:47

## 2021-12-17 RX ADMIN — CARVEDILOL 3.12 MG: 3.12 TABLET, FILM COATED ORAL at 09:08

## 2021-12-17 RX ADMIN — PROPOFOL 25 MG: 10 INJECTION, EMULSION INTRAVENOUS at 12:47

## 2021-12-17 RX ADMIN — CARBIDOPA AND LEVODOPA 2 TABLET: 25; 100 TABLET ORAL at 17:47

## 2021-12-17 RX ADMIN — PROPOFOL 25 MG: 10 INJECTION, EMULSION INTRAVENOUS at 13:00

## 2021-12-17 RX ADMIN — PROPOFOL 25 MG: 10 INJECTION, EMULSION INTRAVENOUS at 12:57

## 2021-12-17 RX ADMIN — HYDRALAZINE HYDROCHLORIDE 50 MG: 50 TABLET, FILM COATED ORAL at 09:06

## 2021-12-17 RX ADMIN — PROPOFOL 25 MG: 10 INJECTION, EMULSION INTRAVENOUS at 12:55

## 2021-12-17 RX ADMIN — CARBIDOPA AND LEVODOPA 2 TABLET: 25; 100 TABLET ORAL at 09:06

## 2021-12-17 RX ADMIN — PROPOFOL 50 MG: 10 INJECTION, EMULSION INTRAVENOUS at 12:46

## 2021-12-17 RX ADMIN — HYDRALAZINE HYDROCHLORIDE 50 MG: 50 TABLET, FILM COATED ORAL at 21:08

## 2021-12-17 RX ADMIN — PROPOFOL 25 MG: 10 INJECTION, EMULSION INTRAVENOUS at 12:53

## 2021-12-17 RX ADMIN — CARVEDILOL 3.12 MG: 3.12 TABLET, FILM COATED ORAL at 17:47

## 2021-12-17 RX ADMIN — Medication 6 MG: at 21:09

## 2021-12-17 RX ADMIN — CARBIDOPA AND LEVODOPA 2 TABLET: 25; 100 TABLET ORAL at 14:23

## 2021-12-17 RX ADMIN — AMLODIPINE BESYLATE 2.5 MG: 5 TABLET ORAL at 09:06

## 2021-12-17 RX ADMIN — SODIUM CHLORIDE: 900 INJECTION, SOLUTION INTRAVENOUS at 12:29

## 2021-12-17 RX ADMIN — DULOXETINE 120 MG: 60 CAPSULE, DELAYED RELEASE ORAL at 09:07

## 2021-12-17 RX ADMIN — BUSPIRONE HYDROCHLORIDE 5 MG: 5 TABLET ORAL at 09:06

## 2021-12-17 RX ADMIN — CARBIDOPA AND LEVODOPA 2 TABLET: 25; 100 TABLET ORAL at 21:08

## 2021-12-17 RX ADMIN — GABAPENTIN 100 MG: 100 CAPSULE ORAL at 21:09

## 2021-12-17 NOTE — PROGRESS NOTES
Physical Therapy  12/17/2021    Chart reviewed for PT treatment. Pt currently off the floor for procedure. Will follow up as time allows.     Thank Brittnee Lyons, PT, DPT

## 2021-12-17 NOTE — PROGRESS NOTES
Chart reviewed in  Prep for OT session- patient off floor for procedure. Will follow up later as able/appropriate.    Grady Patches, OTR/L

## 2021-12-17 NOTE — ROUTINE PROCESS
Alma Pump  1940  [id number]    Situation:  Verbal report received from: Khadra  Procedure: Procedure(s):  ESOPHAGOGASTRODUODENOSCOPY (EGD)  COLONOSCOPY   :-  ENDOSCOPIC POLYPECTOMY    Background:    Preoperative diagnosis: Anemia  Postoperative diagnosis: small hiatal hernia  small hemorrhoids  colon polyp    :  Dr. Toña Friedman  Assistant(s): Endoscopy RN-1: Nikky Schaffer RN  Endoscopy RN-2: Azra Olvera RN    Specimens:   ID Type Source Tests Collected by Time Destination   1 : descending colon polyp Preservative Colon, Descending  Lily Garcia MD 12/17/2021 1304 Pathology     H. Pylori  no    Assessment:  Intra-procedure medications   Anesthesia gave intra-procedure sedation and medications, see anesthesia flow sheet     Intravenous fluids: NS@ KVO     Vital signs stable     Abdominal assessment: round and soft     Recommendation:  Return to floor room 544

## 2021-12-17 NOTE — PROGRESS NOTES
Transition of Care Plan   RUR- 23%    DISPOSITION: The disposition plan is SNF  o [facility]: [facility] #[id number]/ Start Date:12/17 End Date:12/20 Reviewer: Avery Ramirez    F/U with PCP/Specialist     Transport:  [facility]   Procedure: EGD and Colonoscopy 12/17      CM: 2018 [facility] [place],   [phone]

## 2021-12-17 NOTE — ANESTHESIA PREPROCEDURE EVALUATION
Relevant Problems   No relevant active problems       Anesthetic History   No history of anesthetic complications            Review of Systems / Medical History  Patient summary reviewed, nursing notes reviewed and pertinent labs reviewed    Pulmonary          Pneumonia and smoker      Comments: CAP (community acquired pneumonia)  Oxygen 2 liters   Neuro/Psych       CVA  TIA and psychiatric history     Cardiovascular    Hypertension        Dysrhythmias : atrial fibrillation  CAD      Comments: Estimated LVEF is 50 - 55%   GI/Hepatic/Renal     GERD      PUD     Endo/Other    Diabetes    Anemia     Other Findings   Comments: Acute on chronic anemia, hx ulcer         Physical Exam    Airway             Cardiovascular    Rhythm: irregular           Dental         Pulmonary                 Abdominal         Other Findings            Anesthetic Plan    ASA: 3  Anesthesia type: MAC          Induction: Intravenous  Anesthetic plan and risks discussed with: Patient

## 2021-12-17 NOTE — PROGRESS NOTES
Hospitalist Progress Note          Debra Torres MD  Please call  and page for questions. Call physician on-call through the  7pm-7am    Daily Progress Note: 12/17/2021    Primary care provider:Leroy Arce DO    Date of admission: 12/10/2021  3:28 AM    Admission summery and hospital course:  HPI: 81 y.o. 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   Per record, she was picked up from [facility] where she was found to be hypoxic to the fifties on her normal 2 L nasal cannula.    She was recently admitted from 10/29 to 11/3 for sepsis 2/2 proteus UTI.   In the ED, SBP was elevated to one nineties.  Per record, SPO2 is 100% on BiPAP.  Labs were significant for WBC 15.7, creatinine 1.94, CO2 20, troponin 2,017, and probnp 18,236.  Rapid covid is negative. Subjective:     Patient seen and examined scheduled for EGD and colonoscopy today  Patient is from [facility] will go back there after EGD colonoscopy if cleared by GI possibly tomorrow morning    Assessment/Plan:   Acute on Chronic Hypoxic Respiratory Failure  Pulmonary edema  Left lower lobe airspace disease with effusion  Leukocytosis resolved, afebrile.     Continue supplemental oxygen.  Patient received Lasix 80 mg IV on admission.    Continue to monitor I&O, daily weight.    Completed 5 days azithromycin and ceftriaxone.    Follow cultures, strep pneumonia antigen was negative.    CRP was 0.82, rapid Covid 19 test was negative, Legionella pneumonia antigen was negative RSV by PCR was negative and influenza A is negative.        Anemia,  acute on chronic  History of iron deficiency anemia.   Appreciated GI input, patient will have colonoscopy and EGD on . Monitor CBC, H&H stable now, hemodynamically stable. PRBC if less than 7     Hypertensive Emergency  Elevated Troponin  Asymptomatic, EKG with no hyperacute changes. Likely demand from htn, pna, and HF. Troponin is trending down towards normal.  Blood pressure is elevated now but overall reasonable, continue home meds  Hydralazine IV as necessary.     Dementia and mood disorder  Patient knows she is at the hospital, she is oriented to her , month and year. Probably at the baseline.    Continue outpatient melatonin, cymbalta, and buspar.     Parkinsons: continue home sinemet.   Gastroesophageal reflux disease: continue pantoprazole.     Paroxysmal atrial fibrillation; Rate controlled with carvedilol. Hold apixaban for scopes at a.m. may resume after scope if cleared by GI     See orders for other plans. VTE prophylaxis: Patient is not apixaban. Code status: DNR  Discussed plan of care with Patient/Family and Nurse. MPOA: Cheko Marino listed as relative. Discharge planning:   Possibly to her facility Friday p.m. Saturday a.m if she is a stable. Review of Systems:     Review of Systems:  Symptom  Y/N  Comments   Symptom  Y/N  Comments    Fever/Chills   x   Chest Pain  x    Poor Appetite  x    Edema  x     Cough  x   Abdominal Pain  x     Sputum  x   Joint Pain  x    SOB/SALAMANCA  x   Pruritis/Rash      Nausea/vomit  x   Tolerating PT/OT      Diarrhea     Tolerating Diet      Constipation     Other      Could not obtain due to:         Objective:   Physical Exam:     Visit Vitals  BP (!) 172/71 (BP 1 Location: Right arm, BP Patient Position: At rest;Lying)   Pulse (!) 58   Temp 98.3 °F (36.8 °C)   Resp 15   Wt 75.8 kg (167 lb 1.7 oz)   SpO2 92%   BMI 27.81 kg/m²    O2 Flow Rate (L/min): 2 l/min O2 Device: Nasal cannula    Temp (24hrs), Av.9 °F (36.6 °C), Min:97.5 °F (36.4 °C), Max:98.3 °F (36.8 °C)    No intake/output data recorded.    No intake/output data recorded. General:  Alert, cooperative, no distress, appears stated age.  Patient is comfortably resting in chair. Lungs:    Clear to auscultation bilaterally.     Chest wall:  No tenderness or deformity. Heart:  Regular rate and rhythm, S1, S2 normal, no murmur. Abdomen:   Soft, non-tender. Bowel sounds normal.    Extremities: Extremities normal, atraumatic, no cyanosis or edema. Neurologic:  Patient has clear voice.  She follows command.          Data Review:       Recent Days:  Recent Labs     12/17/21  0433 12/15/21  0305   WBC 5.1 4.7   HGB 8.5* 7.7*   HCT 26.6* 23.8*    267     Recent Labs     12/17/21 0433 12/15/21  0305    138   K 3.6 4.3    107   CO2 27 24   * 101*   BUN 24* 36*   CREA 1.26* 1.54*   CA 8.9 8.6   ALB 3.2* 3.0*   ALT 7* 8*     No results for input(s): PH, PCO2, PO2, HCO3, FIO2 in the last 72 hours. 24 Hour Results:  Recent Results (from the past 24 hour(s))   CBC WITH AUTOMATED DIFF    Collection Time: 12/17/21  4:33 AM   Result Value Ref Range    WBC 5.1 3.6 - 11.0 K/uL    RBC 2.70 (L) 3.80 - 5.20 M/uL    HGB 8.5 (L) 11.5 - 16.0 g/dL    HCT 26.6 (L) 35.0 - 47.0 %    MCV 98.5 80.0 - 99.0 FL    MCH 31.5 26.0 - 34.0 PG    MCHC 32.0 30.0 - 36.5 g/dL    RDW 14.6 (H) 11.5 - 14.5 %    PLATELET 261 985 - 181 K/uL    MPV 8.9 8.9 - 12.9 FL    NRBC 0.0 0  WBC    ABSOLUTE NRBC 0.00 0.00 - 0.01 K/uL    NEUTROPHILS 68 32 - 75 %    LYMPHOCYTES 17 12 - 49 %    MONOCYTES 10 5 - 13 %    EOSINOPHILS 4 0 - 7 %    BASOPHILS 1 0 - 1 %    IMMATURE GRANULOCYTES 0 0.0 - 0.5 %    ABS. NEUTROPHILS 3.5 1.8 - 8.0 K/UL    ABS. LYMPHOCYTES 0.9 0.8 - 3.5 K/UL    ABS. MONOCYTES 0.5 0.0 - 1.0 K/UL    ABS. EOSINOPHILS 0.2 0.0 - 0.4 K/UL    ABS. BASOPHILS 0.0 0.0 - 0.1 K/UL    ABS. IMM.  GRANS. 0.0 0.00 - 0.04 K/UL    DF AUTOMATED     METABOLIC PANEL, COMPREHENSIVE    Collection Time: 12/17/21  4:33 AM   Result Value Ref Range    Sodium 140 136 - 145 mmol/L    Potassium 3.6 3.5 - 5.1 mmol/L    Chloride 107 97 - 108 mmol/L    CO2 27 21 - 32 mmol/L    Anion gap 6 5 - 15 mmol/L    Glucose 110 (H) 65 - 100 mg/dL    BUN 24 (H) 6 - 20 MG/DL    Creatinine 1.26 (H) 0.55 - 1.02 MG/DL    BUN/Creatinine ratio 19 12 - 20      GFR est AA 50 (L) >60 ml/min/1.73m2    GFR est non-AA 41 (L) >60 ml/min/1.73m2    Calcium 8.9 8.5 - 10.1 MG/DL    Bilirubin, total 0.5 0.2 - 1.0 MG/DL    ALT (SGPT) 7 (L) 12 - 78 U/L    AST (SGOT) 20 15 - 37 U/L    Alk. phosphatase 85 45 - 117 U/L    Protein, total 5.7 (L) 6.4 - 8.2 g/dL    Albumin 3.2 (L) 3.5 - 5.0 g/dL    Globulin 2.5 2.0 - 4.0 g/dL    A-G Ratio 1.3 1.1 - 2.2         Problem List:  Problem List as of 12/17/2021 Date Reviewed: 8/26/2021          Codes Class Noted - Resolved    CAP (community acquired pneumonia) ICD-10-CM: J18.9  ICD-9-CM: 486  12/10/2021 - Present        Acute on chronic respiratory failure with hypoxia ([facility] ICD-10-CM: J96.21  ICD-9-CM: 518.84, 799.02  12/10/2021 - Present        Cellulitis and abscess of lower extremity ICD-10-CM: L03.119, L02.419  ICD-9-CM: 682.6  10/29/2021 - Present        SOB (shortness of breath) ICD-10-CM: R06.02  ICD-9-CM: 786.05  4/23/2021 - Present        UTI (urinary tract infection) ICD-10-CM: N39.0  ICD-9-CM: 599.0  4/9/2021 - Present        CHF exacerbation ([facility] ICD-10-CM: I50.9  ICD-9-CM: 428.0  9/25/2020 - Present        Weakness ICD-10-CM: R53.1  ICD-9-CM: 780.79  5/4/2020 - Present        Generalized weakness ICD-10-CM: R53.1  ICD-9-CM: 780.79  4/30/2020 - Present        Carotid artery stenosis, symptomatic, left ICD-10-CM: P54.17  ICD-9-CM: 433.10  7/26/2019 - Present        HTN (hypertension) ICD-10-CM: I10  ICD-9-CM: 401.9  7/17/2019 - Present        Acute ischemic stroke ([facility] ICD-10-CM: I63.9  ICD-9-CM: 434.91  7/12/2019 - Present        Fall ICD-10-CM: W19. Kulwinder Cui  ICD-9-CM: P554.8  12/24/2018 - Present        CKD (chronic kidney disease), stage III ([facility] ICD-10-CM: N18.30  ICD-9-CM: 585.3  7/8/2015 - Present        Edema ICD-10-CM: R60.9  ICD-9-CM: 782.3  5/6/2015 - Present        Diabetes mellitus ([facility] ICD-10-CM: E11.9  ICD-9-CM: 250.00  5/6/2015 - Present        Postoperative anemia due to acute blood loss ICD-10-CM: D62  ICD-9-CM: 285.1  2/14/2015 - Present        S/P CABG (coronary artery bypass graft) ICD-10-CM: Z95.1  ICD-9-CM: V45.81  2/13/2015 - Present        Syncope ICD-10-CM: R55  ICD-9-CM: 780.2  2/9/2015 - Present        Bradycardia ICD-10-CM: R00.1  ICD-9-CM: 427.89  2/9/2015 - Present        Acute kidney injury ([facility] ICD-10-CM: N17.9  ICD-9-CM: 584.9  2/9/2015 - Present        Anemia ICD-10-CM: D64.9  ICD-9-CM: 285.9  9/9/2014 - Present        GI bleed ICD-10-CM: K92.2  ICD-9-CM: 578.9  9/9/2014 - Present        AF (atrial fibrillation) ([facility]) ICD-10-CM: I48.91  ICD-9-CM: 427.31  6/20/2014 - Present        Hypotension ICD-10-CM: I95.9  ICD-9-CM: 458.9  6/3/2014 - Present        Coronary artery disease ICD-10-CM: I25.10  ICD-9-CM: 414.00  6/3/2014 - Present    Overview Signed 2/10/2015 11:06 AM by Huang Bustillo     2007 PCI x2 to LAD with STEPHAN             S/P coronary artery stent placement ICD-10-CM: Z95.5  ICD-9-CM: V45.82  6/3/2014 - Present        Renal failure ICD-10-CM: N19  ICD-9-CM: 618  6/3/2014 - Present        Elevated troponin ICD-10-CM: R77.8  ICD-9-CM: 790.6  6/3/2014 - Present        Pericardial effusion ICD-10-CM: I31.3  ICD-9-CM: 423.9  6/3/2014 - Present        Lactic acidosis ICD-10-CM: E87.2  ICD-9-CM: 276.2  6/3/2014 - Present        RESOLVED: CHF (congestive heart failure) ([facility]) ICD-10-CM: I50.9  ICD-9-CM: 428.0  9/22/2020 - 8/3/2021              Medications reviewed  Current Facility-Administered Medications   Medication Dose Route Frequency    ARIPiprazole (ABILIFY) tablet 20 mg  20 mg Oral DAILY    hydrALAZINE (APRESOLINE) tablet 50 mg  50 mg Oral TID    hydrALAZINE (APRESOLINE) 20 mg/mL injection 10 mg  10 mg IntraVENous Q6H PRN    furosemide (LASIX) injection 40 mg  40 mg IntraVENous Q MON, WED & SAT    sodium chloride (NS) flush 5-40 mL  5-40 mL IntraVENous Q8H    sodium chloride (NS) flush 5-40 mL  5-40 mL IntraVENous PRN    acetaminophen (TYLENOL) tablet 650 mg  650 mg Oral Q6H PRN    Or    acetaminophen (TYLENOL) suppository 650 mg  650 mg Rectal Q6H PRN    polyethylene glycol (MIRALAX) packet 17 g  17 g Oral DAILY PRN    ondansetron (ZOFRAN ODT) tablet 4 mg  4 mg Oral Q8H PRN    Or    ondansetron (ZOFRAN) injection 4 mg  4 mg IntraVENous Q6H PRN    [Held by provider] apixaban (ELIQUIS) tablet 2.5 mg  2.5 mg Oral BID    amLODIPine (NORVASC) tablet 2.5 mg  2.5 mg Oral DAILY    atorvastatin (LIPITOR) tablet 40 mg  40 mg Oral QHS    busPIRone (BUSPAR) tablet 5 mg  5 mg Oral TID    carbidopa-levodopa (SINEMET)  mg per tablet 2 Tablet  2 Tablet Oral QID    carvediloL (COREG) tablet 3.125 mg  3.125 mg Oral BID WITH MEALS    DULoxetine (CYMBALTA) capsule 120 mg  120 mg Oral DAILY    gabapentin (NEURONTIN) capsule 100 mg  100 mg Oral QHS    melatonin tablet 6 mg  6 mg Oral QHS    pantoprazole (PROTONIX) tablet 40 mg  40 mg Oral ACB       Care Plan discussed with: Patient/Family, Nurse and     Total time spent with patient: 30 minutes.     Sony Nunez MD

## 2021-12-17 NOTE — PERIOP NOTES
Procedure nurse called floor, [facility], to give report on patient. Receiving nurse at lunch break. Requested nurse call to Endo recovery when she returns for a post-procedure update.

## 2021-12-17 NOTE — PROCEDURES
[address] [facility]  [address] [facility] [address] [place], [address]  [phone]                              Colonoscopy Procedure Note      Indications:    Anemia     :  Jam Marshall MD    Surgical Assistant: Endoscopy RN-1: Dung Flores RN  Endoscopy RN-2: Stacy Buck RN    Implants: none    Referring Provider: Giovanni Ledezma DO    Sedation:  MAC anesthesia    Procedure Details:  After informed consent was obtained with all risks and benefits of procedure explained and preoperative exam completed, the patient was taken to the endoscopy suite and placed in the left lateral decubitus position. Upon sequential sedation as per above, a digital rectal exam was performed  And was normal.  The Olympus videocolonoscope  was inserted in the rectum and carefully advanced to the cecum, which was identified by the ileocecal valve and appendiceal orifice. The quality of preparation was good. The colonoscope was slowly withdrawn with careful evaluation between folds. Retroflexion in the rectum was performed and was normal..     Findings:   Rectum: no mucosal lesion appreciated  Grade 1 internal hemorrhoid(s); Sigmoid: no mucosal lesion appreciated  Descending Colon: 2  Sessile polyp(s), the largest 4 mm in size;  Transverse Colon: no mucosal lesion appreciated  Ascending Colon: no mucosal lesion appreciated  Cecum: no mucosal lesion appreciated  Terminal Ileum: not intubated    Interventions:  2 complete polypectomy were performed using cold biopsy forceps and the polyps were  retrieved    Specimen Removed:    ID Type Source Tests Collected by Time Destination   1 : descending colon polyp Preservative Colon, Descending  Silke Hurley MD 12/17/2021 1304 Pathology       Complications: None. EBL:  None. Recommendations: -Await pathology.  -Regular diet. Resume normal medication(s).   -GI will sign off at this time     Discharge Disposition: Continue care in the hospital per primary team    Dell Ching MD  12/17/2021  1:09 PM

## 2021-12-17 NOTE — PERIOP NOTES

## 2021-12-17 NOTE — PROCEDURES
[address]  [address] [place]  [place], [address]  [phone]                            NAME:  Karl Chatterjee   :   1940   MRN:   [id number]     Date/Time:  2021 1:07 PM    Esophagogastroduodenoscopy (EGD) Procedure Note    :  Rosalind Schaefer MD    Staff: Endoscopy RN-1: Juliana Hameed RN  Endoscopy RN-2: Galilea Melissa RN     Implants: none    Referring Provider:  Clau Page DO    Anethesia/Sedation:  MAC anesthesia    Procedure Details     After infom consent was obtained for the procedure, with all risks and benefits of procedure explained the patient was taken to the endoscopy suite and placed in the left lateral decubitus position. Following sequential administration of sedation as per above, the PPTY325 gastroscope was inserted into the mouth and advanced under direct vision to second portion of the duodenum. A careful inspection was made as the gastroscope was withdrawn, including a retroflexed view of the proximal stomach; findings and interventions are described below. Findings:  Esophagus: Normal mucosa entire esophagus. Small sliding hiatal hernia was noted with diaphragmatic pinch at 40 cm and Z-line at 38 cm. Stomach:normal   Duodenum/jejunum:normal      Therapies:  none    Specimens: none           EBL: None    Complications:   None; patient tolerated the procedure well. Impression:    See Postoperative diagnosis above    Recommendations:  -Follow clinical symptoms and laboratory studies for evidence of rebleeding. , -Regular diet.  -Colonoscopy today    Discharge disposition: Continue care in hospital    Rosalind Schaefer MD

## 2021-12-18 ENCOUNTER — APPOINTMENT (OUTPATIENT)
Dept: GENERAL RADIOLOGY | Age: 81
DRG: 193 | End: 2021-12-18
Attending: NURSE PRACTITIONER
Payer: MEDICARE

## 2021-12-18 LAB
GLUCOSE BLD STRIP.AUTO-MCNC: 156 MG/DL (ref 65–117)
GLUCOSE BLD STRIP.AUTO-MCNC: 96 MG/DL (ref 65–117)
SERVICE CMNT-IMP: ABNORMAL
SERVICE CMNT-IMP: NORMAL

## 2021-12-18 PROCEDURE — 74011250636 HC RX REV CODE- 250/636: Performed by: INTERNAL MEDICINE

## 2021-12-18 PROCEDURE — 77010033678 HC OXYGEN DAILY

## 2021-12-18 PROCEDURE — 65660000001 HC RM ICU INTERMED STEPDOWN

## 2021-12-18 PROCEDURE — 82962 GLUCOSE BLOOD TEST: CPT

## 2021-12-18 PROCEDURE — 74011250637 HC RX REV CODE- 250/637: Performed by: INTERNAL MEDICINE

## 2021-12-18 PROCEDURE — 74011250637 HC RX REV CODE- 250/637: Performed by: FAMILY MEDICINE

## 2021-12-18 PROCEDURE — 71045 X-RAY EXAM CHEST 1 VIEW: CPT

## 2021-12-18 PROCEDURE — 74011250636 HC RX REV CODE- 250/636: Performed by: NURSE PRACTITIONER

## 2021-12-18 RX ORDER — FUROSEMIDE 40 MG/1
40 TABLET ORAL DAILY
Status: DISCONTINUED | OUTPATIENT
Start: 2021-12-19 | End: 2021-12-21 | Stop reason: HOSPADM

## 2021-12-18 RX ORDER — ALBUTEROL SULFATE 0.83 MG/ML
2.5 SOLUTION RESPIRATORY (INHALATION)
Status: ACTIVE | OUTPATIENT
Start: 2021-12-18 | End: 2021-12-18

## 2021-12-18 RX ORDER — FUROSEMIDE 10 MG/ML
40 INJECTION INTRAMUSCULAR; INTRAVENOUS ONCE
Status: COMPLETED | OUTPATIENT
Start: 2021-12-18 | End: 2021-12-18

## 2021-12-18 RX ORDER — HYDRALAZINE HYDROCHLORIDE 20 MG/ML
10 INJECTION INTRAMUSCULAR; INTRAVENOUS
Status: DISCONTINUED | OUTPATIENT
Start: 2021-12-18 | End: 2021-12-21 | Stop reason: HOSPADM

## 2021-12-18 RX ADMIN — PANTOPRAZOLE SODIUM 40 MG: 40 TABLET, DELAYED RELEASE ORAL at 07:39

## 2021-12-18 RX ADMIN — CARBIDOPA AND LEVODOPA 2 TABLET: 25; 100 TABLET ORAL at 17:16

## 2021-12-18 RX ADMIN — Medication 5 ML: at 17:18

## 2021-12-18 RX ADMIN — DULOXETINE 120 MG: 60 CAPSULE, DELAYED RELEASE ORAL at 10:28

## 2021-12-18 RX ADMIN — CARBIDOPA AND LEVODOPA 2 TABLET: 25; 100 TABLET ORAL at 23:17

## 2021-12-18 RX ADMIN — ARIPIPRAZOLE 20 MG: 5 TABLET ORAL at 10:33

## 2021-12-18 RX ADMIN — BUSPIRONE HYDROCHLORIDE 5 MG: 5 TABLET ORAL at 23:17

## 2021-12-18 RX ADMIN — CARVEDILOL 3.12 MG: 3.12 TABLET, FILM COATED ORAL at 17:16

## 2021-12-18 RX ADMIN — Medication 10 ML: at 23:18

## 2021-12-18 RX ADMIN — BUSPIRONE HYDROCHLORIDE 5 MG: 5 TABLET ORAL at 10:28

## 2021-12-18 RX ADMIN — HYDRALAZINE HYDROCHLORIDE 50 MG: 50 TABLET, FILM COATED ORAL at 17:17

## 2021-12-18 RX ADMIN — Medication 10 ML: at 10:38

## 2021-12-18 RX ADMIN — FUROSEMIDE 40 MG: 10 INJECTION, SOLUTION INTRAMUSCULAR; INTRAVENOUS at 03:14

## 2021-12-18 RX ADMIN — AMLODIPINE BESYLATE 2.5 MG: 5 TABLET ORAL at 10:28

## 2021-12-18 RX ADMIN — FUROSEMIDE 40 MG: 10 INJECTION, SOLUTION INTRAMUSCULAR; INTRAVENOUS at 10:34

## 2021-12-18 RX ADMIN — HYDRALAZINE HYDROCHLORIDE 50 MG: 50 TABLET, FILM COATED ORAL at 10:28

## 2021-12-18 RX ADMIN — Medication 5 ML: at 17:17

## 2021-12-18 RX ADMIN — BUSPIRONE HYDROCHLORIDE 5 MG: 5 TABLET ORAL at 17:17

## 2021-12-18 RX ADMIN — APIXABAN 2.5 MG: 2.5 TABLET, FILM COATED ORAL at 17:16

## 2021-12-18 RX ADMIN — HYDRALAZINE HYDROCHLORIDE 50 MG: 50 TABLET, FILM COATED ORAL at 23:17

## 2021-12-18 RX ADMIN — CARVEDILOL 3.12 MG: 3.12 TABLET, FILM COATED ORAL at 10:28

## 2021-12-18 RX ADMIN — Medication 6 MG: at 23:17

## 2021-12-18 RX ADMIN — Medication 10 ML: at 23:17

## 2021-12-18 RX ADMIN — ATORVASTATIN CALCIUM 40 MG: 40 TABLET, FILM COATED ORAL at 23:17

## 2021-12-18 RX ADMIN — CARBIDOPA AND LEVODOPA 2 TABLET: 25; 100 TABLET ORAL at 10:28

## 2021-12-18 RX ADMIN — Medication 10 ML: at 10:37

## 2021-12-18 RX ADMIN — GABAPENTIN 100 MG: 100 CAPSULE ORAL at 23:17

## 2021-12-18 NOTE — PROGRESS NOTES
Hospitalist Progress Note          Filiberto Donato MD  Please call  and page for questions. Call physician on-call through the  7pm-7am    Daily Progress Note: 12/18/2021    Primary care provider:Jamie Arce Ra, DO    Date of admission: 12/10/2021  3:28 AM    Admission summery and hospital course:  HPI: 81 y.o. 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   Per record, she was picked up from [facility] where she was found to be hypoxic to the fifties on her normal 2 L nasal cannula.    She was recently admitted from 10/29 to 11/3 for sepsis 2/2 proteus UTI.   In the ED, SBP was elevated to one nineties.  Per record, SPO2 is 100% on BiPAP.  Labs were significant for WBC 15.7, creatinine 1.94, CO2 20, troponin 2,017, and probnp 18,236.  Rapid covid is negative. Subjective:     Patient seen and examined     Noted overnight events,she became short of breath- CXR showed pulmonary edema-was placed on BiPAP and received 40 mg IV lasix. This morning,she was on 2 lt oxygen  States that breathing is better,denied chest pain        Assessment/Plan:   Acute on Chronic Hypoxic Respiratory Failure  Pulmonary edema-Acute on chronic diastolic heart failure   Left lower lobe airspace disease with effusion  Leukocytosis resolved, afebrile.     Continue supplemental oxygen.    Completed 5 days azithromycin and ceftriaxone.    Follow cultures, strep pneumonia antigen was negative.    CRP was 0.82, rapid Covid 19 test was negative, Legionella pneumonia antigen was negative RSV by PCR was negative and influenza A is negative.     -Echo 8/2021-Moderate (grade 2) left ventricular diastolic dysfunction.   Received 40 mg lasix IV twice today  -check BMP evening       Anemia,  acute on chronic  History of iron deficiency anemia. Appreciated GI input, patient had  colonoscopy and EGD on -results reviewed  -Hb stable       Hypertensive Emergency-resolved  HTN  -continue current meds  -last cardiac cath 2021-results reviewed     Dementia and mood disorder  Continue outpatient melatonin, cymbalta, and buspar.     Parkinsons: continue home sinemet.   Gastroesophageal reflux disease: continue pantoprazole.     Paroxysmal atrial fibrillation; Rate controlled with carvedilol. Resume eliquis     See orders for other plans. VTE prophylaxis: Patient is not apixaban. Code status: DNR  Discussed plan of care with Patient/Family and Nurse. MPOA: Violette Olivera listed as relative. Review of Systems:     Review of Systems:  As per HPI  Objective:   Physical Exam:     Visit Vitals  BP (!) 145/54   Pulse 65   Temp 97.6 °F (36.4 °C)   Resp 24   Wt 75.8 kg (167 lb 1.7 oz)   SpO2 97%   BMI 27.81 kg/m²    O2 Flow Rate (L/min): 2 l/min O2 Device: Nasal cannula    Temp (24hrs), Av.9 °F (36.6 °C), Min:97.6 °F (36.4 °C), Max:98.3 °F (36.8 °C)    No intake/output data recorded.  1901 -  0700  In: 275 [I.V.:275]  Out: -     General:  Alert, cooperative, no distress, appears stated age.    Lungs:    decreased at based b/l ,no wheezing     Chest wall:  No tenderness or deformity. Heart:  Regular rate and rhythm, S1, S2 normal, no murmur. Abdomen:   Soft, non-tender. Bowel sounds normal.    Extremities: Extremities normal, atraumatic, no LE edema.    Neurologic:  Patient has clear voice.  She follows command.  Alert and oriented 2-3        Data Review:       Recent Days:  Recent Labs     21  0433   WBC 5.1   HGB 8.5*   HCT 26.6*        Recent Labs     21  0433      K 3.6      CO2 27   *   BUN 24*   CREA 1.26*   CA 8.9   ALB 3.2*   ALT 7*     No results for input(s): PH, PCO2, PO2, HCO3, FIO2 in the last 72 hours. 24 Hour Results:  Recent Results (from the past 24 hour(s))   GLUCOSE, POC    Collection Time: 12/18/21 10:18 AM   Result Value Ref Range    Glucose (POC) 96 65 - 117 mg/dL    Performed by Sumeet Chacon    GLUCOSE, POC    Collection Time: 12/18/21 11:53 AM   Result Value Ref Range    Glucose (POC) 156 (H) 65 - 117 mg/dL    Performed by Sumeet Chacon        Problem List:  Problem List as of 12/18/2021 Date Reviewed: 8/26/2021          Codes Class Noted - Resolved    CAP (community acquired pneumonia) ICD-10-CM: J18.9  ICD-9-CM: 486  12/10/2021 - Present        Acute on chronic respiratory failure with hypoxia ([facility] ICD-10-CM: J96.21  ICD-9-CM: 518.84, 799.02  12/10/2021 - Present        Cellulitis and abscess of lower extremity ICD-10-CM: L03.119, L02.419  ICD-9-CM: 682.6  10/29/2021 - Present        SOB (shortness of breath) ICD-10-CM: R06.02  ICD-9-CM: 786.05  4/23/2021 - Present        UTI (urinary tract infection) ICD-10-CM: N39.0  ICD-9-CM: 599.0  4/9/2021 - Present        CHF exacerbation ([facility] ICD-10-CM: I50.9  ICD-9-CM: 428.0  9/25/2020 - Present        Weakness ICD-10-CM: R53.1  ICD-9-CM: 780.79  5/4/2020 - Present        Generalized weakness ICD-10-CM: R53.1  ICD-9-CM: 780.79  4/30/2020 - Present        Carotid artery stenosis, symptomatic, left ICD-10-CM: K64.24  ICD-9-CM: 433.10  7/26/2019 - Present        HTN (hypertension) ICD-10-CM: I10  ICD-9-CM: 401.9  7/17/2019 - Present        Acute ischemic stroke ([facility] ICD-10-CM: I63.9  ICD-9-CM: 434.91  7/12/2019 - Present        Fall ICD-10-CM: [address]  ICD-9-CM: E888.9  12/24/2018 - Present        CKD (chronic kidney disease), stage III ([facility] ICD-10-CM: N18.30  ICD-9-CM: 585.3  7/8/2015 - Present        Edema ICD-10-CM: R60.9  ICD-9-CM: 782.3  5/6/2015 - Present        Diabetes mellitus ([facility] ICD-10-CM: E11.9  ICD-9-CM: 250.00  5/6/2015 - Present        Postoperative anemia due to acute blood loss ICD-10-CM: D62  ICD-9-CM: 285.1  2/14/2015 - Present        S/P CABG (coronary artery bypass graft) ICD-10-CM: Z95.1  ICD-9-CM: V45.81  2/13/2015 - Present        Syncope ICD-10-CM: R55  ICD-9-CM: 780.2  2/9/2015 - Present        Bradycardia ICD-10-CM: R00.1  ICD-9-CM: 427.89  2/9/2015 - Present        Acute kidney injury ([facility] ICD-10-CM: N17.9  ICD-9-CM: 584.9  2/9/2015 - Present        Anemia ICD-10-CM: D64.9  ICD-9-CM: 285.9  9/9/2014 - Present        GI bleed ICD-10-CM: K92.2  ICD-9-CM: 578.9  9/9/2014 - Present        AF (atrial fibrillation) ([facility]) ICD-10-CM: I48.91  ICD-9-CM: 427.31  6/20/2014 - Present        Hypotension ICD-10-CM: I95.9  ICD-9-CM: 458.9  6/3/2014 - Present        Coronary artery disease ICD-10-CM: I25.10  ICD-9-CM: 414.00  6/3/2014 - Present    Overview Signed 2/10/2015 11:06 AM by Shiv Pierson     2007 PCI x2 to LAD with STEPHAN             S/P coronary artery stent placement ICD-10-CM: Z95.5  ICD-9-CM: V45.82  6/3/2014 - Present        Renal failure ICD-10-CM: N19  ICD-9-CM: 586  6/3/2014 - Present        Elevated troponin ICD-10-CM: R77.8  ICD-9-CM: 790.6  6/3/2014 - Present        Pericardial effusion ICD-10-CM: I31.3  ICD-9-CM: 423.9  6/3/2014 - Present        Lactic acidosis ICD-10-CM: E87.2  ICD-9-CM: 276.2  6/3/2014 - Present        RESOLVED: CHF (congestive heart failure) ([facility]) ICD-10-CM: I50.9  ICD-9-CM: 428.0  9/22/2020 - 8/3/2021              Medications reviewed  Current Facility-Administered Medications   Medication Dose Route Frequency    albuterol (PROVENTIL VENTOLIN) nebulizer solution 2.5 mg  2.5 mg Nebulization NOW    hydrALAZINE (APRESOLINE) 20 mg/mL injection 10 mg  10 mg IntraVENous Q6H PRN    [START ON 12/19/2021] furosemide (LASIX) tablet 40 mg  40 mg Oral DAILY    sodium chloride (NS) flush 5-40 mL  5-40 mL IntraVENous Q8H    sodium chloride (NS) flush 5-40 mL  5-40 mL IntraVENous PRN    ARIPiprazole (ABILIFY) tablet 20 mg  20 mg Oral DAILY    hydrALAZINE (APRESOLINE) tablet 50 mg 50 mg Oral TID    sodium chloride (NS) flush 5-40 mL  5-40 mL IntraVENous Q8H    sodium chloride (NS) flush 5-40 mL  5-40 mL IntraVENous PRN    acetaminophen (TYLENOL) tablet 650 mg  650 mg Oral Q6H PRN    Or    acetaminophen (TYLENOL) suppository 650 mg  650 mg Rectal Q6H PRN    polyethylene glycol (MIRALAX) packet 17 g  17 g Oral DAILY PRN    ondansetron (ZOFRAN ODT) tablet 4 mg  4 mg Oral Q8H PRN    Or    ondansetron (ZOFRAN) injection 4 mg  4 mg IntraVENous Q6H PRN    apixaban (ELIQUIS) tablet 2.5 mg  2.5 mg Oral BID    amLODIPine (NORVASC) tablet 2.5 mg  2.5 mg Oral DAILY    atorvastatin (LIPITOR) tablet 40 mg  40 mg Oral QHS    busPIRone (BUSPAR) tablet 5 mg  5 mg Oral TID    carbidopa-levodopa (SINEMET)  mg per tablet 2 Tablet  2 Tablet Oral QID    carvediloL (COREG) tablet 3.125 mg  3.125 mg Oral BID WITH MEALS    DULoxetine (CYMBALTA) capsule 120 mg  120 mg Oral DAILY    gabapentin (NEURONTIN) capsule 100 mg  100 mg Oral QHS    melatonin tablet 6 mg  6 mg Oral QHS    pantoprazole (PROTONIX) tablet 40 mg  40 mg Oral ACB       Care Plan discussed with: Patient/Family, Nurse and     Total time spent with patient: 30 minutes.     Sayda Almanzar MD

## 2021-12-18 NOTE — PROGRESS NOTES
Rapid Response Documentation    Name: Maddi Vásquez  YOB: 1940  MRN: [id number]  Admission Date: 12/10/2021  3:28 AM      Date of service: 12/18/2021    Active Diagnoses:    Hospital Problems  Date Reviewed: 8/26/2021          Codes Class Noted POA    CAP (community acquired pneumonia) ICD-10-CM: J18.9  ICD-9-CM: 566  12/10/2021 Unknown        Acute on chronic respiratory failure with hypoxia [facility]) ICD-10-CM: J96.21  ICD-9-CM: 518.84, 799.02  12/10/2021 Unknown        Elevated troponin ICD-10-CM: R77.8  ICD-9-CM: 790.6  6/3/2014 Unknown              Chief Complaint:  5262Q: Rapid response called for acute hypoxia and complaints of shortness of breath, sudden onset while lying in bed. Denies chest pain and palpitations    Clinical Presentation:  81-year-old female originally admitted 12/10/2021 with dyspnea due to pulmonary edema and pneumonia. She was hypoxic at admission to the ED but apparently improved after antibiotic treatment to the point where she was maintaining SPO2 on 2 LNC. PMH significant for A. fib, CAD, CKD. She had a colonoscopy 12/17 to evaluate for anemia. 2 polyps removed, procedure otherwise nondiagnostic for anemia. Has history of duodenal ulcer, upper endoscopy plan once on its Eliquis for 2 to 3 days. Currently receiving furosemide 3 times weekly for pulmonary edema, last dose 12/15. Most recent CXR 1 week ago. Physical Exam:   · Alert, orientation is at baseline. Appears mildly distressed  · Lungs coarse throughout, crackles bilaterally mid to base. · Respirations even and unlabored.   Patient not able to speak in complete sentences  · No sternal costal retractions, no abdominal movement      Patient Vitals for the past 12 hrs:   Temp Pulse Resp BP SpO2   12/18/21 0146  61      12/18/21 0008 98 °F (36.7 °C) 61 16 131/74 94 %   12/17/21 2149  (!) 58      12/17/21 2002 98.2 °F (36.8 °C) 69 15 (!) 146/77 93 %   12/17/21 1953  (!) 57      12/17/21 1800  3928 [facility]         Data Reviewed: All diagnostic labs and studies have been reviewed. Assessment and Plan:    Acute proximal respiratory failure due to pulmonary edema  · Currently on NRB, SaO2 100%  · CXR: Pulmonary edema with bibasilar consolidation without significant change.   · Lasix 40 mg now  · Initiate BiPAP, wean back to nasal cannula when able  · Transfer to IMCU  · Will consider a single dose of morphine if necessary              CAROLANN Cordero, RN, NP-C  [phone] or via [employer]    12/18/2021

## 2021-12-18 NOTE — ROUTINE PROCESS
Pt is A&Ox4 and arrived safely from 5W to unit with a RRT and tech this morning and was on Bi-Pap. Pt oriented to room and unit and on bed rest due to weakness. Pt able to call for assistance and tolerating all meds and care. Pt was put on 2L NC sattting 95-97% and above. Lung sound diminished. No labored breathing noted Pt's VSS on 2L nc and resting comfortably this morning. Safety precaution in place call light within reach will continue to monitor.

## 2021-12-18 NOTE — PROGRESS NOTES
Patient woke up diaphoretic and complaining she could not breath. RN sat the head of the bed up and checked patients O2 saturations which were in the lower 70's. RN titrated her O2 from 1L to 3L. RN called a rapid response. Patient was assessed and placed on a non re breather. 40mg of Furosamide was administered IV. Patient placed on BiPap and transferred to [facility].

## 2021-12-18 NOTE — PROGRESS NOTES
Problem: Falls - Risk of  Goal: *Absence of Falls  Description: Document Jacob Cease Fall Risk and appropriate interventions in the flowsheet. Outcome: Progressing Towards Goal  Note: Fall Risk Interventions:  Mobility Interventions: Bed/chair exit alarm    Mentation Interventions: Bed/chair exit alarm    Medication Interventions: Bed/chair exit alarm    Elimination Interventions: Call light in reach              Problem: Pressure Injury - Risk of  Goal: *Prevention of pressure injury  Description: Document Gregorio Scale and appropriate interventions in the flowsheet. Outcome: Progressing Towards Goal  Note: Pressure Injury Interventions:  Sensory Interventions: Assess changes in LOC    Moisture Interventions: Internal/External urinary devices    Activity Interventions: Pressure redistribution bed/mattress(bed type)    Mobility Interventions: Pressure redistribution bed/mattress (bed type)    Nutrition Interventions: Offer support with meals,snacks and hydration    Friction and Shear Interventions: HOB 30 degrees or less                Problem: Risk for Spread of Infection  Goal: Prevent transmission of infectious organism to others  Description: Prevent the transmission of infectious organisms to other patients, staff members, and visitors.   Outcome: Progressing Towards Goal

## 2021-12-19 LAB
ANION GAP SERPL CALC-SCNC: 6 MMOL/L (ref 5–15)
BNP SERPL-MCNC: 8184 PG/ML
BUN SERPL-MCNC: 27 MG/DL (ref 6–20)
BUN/CREAT SERPL: 18 (ref 12–20)
CALCIUM SERPL-MCNC: 8.4 MG/DL (ref 8.5–10.1)
CHLORIDE SERPL-SCNC: 104 MMOL/L (ref 97–108)
CO2 SERPL-SCNC: 28 MMOL/L (ref 21–32)
CREAT SERPL-MCNC: 1.54 MG/DL (ref 0.55–1.02)
GLUCOSE SERPL-MCNC: 109 MG/DL (ref 65–100)
MAGNESIUM SERPL-MCNC: 1.8 MG/DL (ref 1.6–2.4)
POTASSIUM SERPL-SCNC: 3.9 MMOL/L (ref 3.5–5.1)
SODIUM SERPL-SCNC: 138 MMOL/L (ref 136–145)

## 2021-12-19 PROCEDURE — 83735 ASSAY OF MAGNESIUM: CPT

## 2021-12-19 PROCEDURE — 77010033678 HC OXYGEN DAILY

## 2021-12-19 PROCEDURE — 74011250637 HC RX REV CODE- 250/637: Performed by: INTERNAL MEDICINE

## 2021-12-19 PROCEDURE — 36415 COLL VENOUS BLD VENIPUNCTURE: CPT

## 2021-12-19 PROCEDURE — 83880 ASSAY OF NATRIURETIC PEPTIDE: CPT

## 2021-12-19 PROCEDURE — 74011250637 HC RX REV CODE- 250/637: Performed by: FAMILY MEDICINE

## 2021-12-19 PROCEDURE — 74011250637 HC RX REV CODE- 250/637: Performed by: HOSPITALIST

## 2021-12-19 PROCEDURE — 80048 BASIC METABOLIC PNL TOTAL CA: CPT

## 2021-12-19 PROCEDURE — 65270000029 HC RM PRIVATE

## 2021-12-19 RX ADMIN — CARBIDOPA AND LEVODOPA 2 TABLET: 25; 100 TABLET ORAL at 17:08

## 2021-12-19 RX ADMIN — Medication 10 ML: at 21:23

## 2021-12-19 RX ADMIN — APIXABAN 2.5 MG: 2.5 TABLET, FILM COATED ORAL at 10:03

## 2021-12-19 RX ADMIN — AMLODIPINE BESYLATE 2.5 MG: 5 TABLET ORAL at 10:03

## 2021-12-19 RX ADMIN — CARBIDOPA AND LEVODOPA 2 TABLET: 25; 100 TABLET ORAL at 21:22

## 2021-12-19 RX ADMIN — CARVEDILOL 3.12 MG: 3.12 TABLET, FILM COATED ORAL at 10:03

## 2021-12-19 RX ADMIN — Medication 6 MG: at 21:22

## 2021-12-19 RX ADMIN — DULOXETINE 120 MG: 60 CAPSULE, DELAYED RELEASE ORAL at 10:03

## 2021-12-19 RX ADMIN — Medication 10 ML: at 17:09

## 2021-12-19 RX ADMIN — HYDRALAZINE HYDROCHLORIDE 50 MG: 50 TABLET, FILM COATED ORAL at 10:02

## 2021-12-19 RX ADMIN — HYDRALAZINE HYDROCHLORIDE 50 MG: 50 TABLET, FILM COATED ORAL at 21:22

## 2021-12-19 RX ADMIN — FUROSEMIDE 40 MG: 40 TABLET ORAL at 12:05

## 2021-12-19 RX ADMIN — BUSPIRONE HYDROCHLORIDE 5 MG: 5 TABLET ORAL at 21:22

## 2021-12-19 RX ADMIN — HYDRALAZINE HYDROCHLORIDE 50 MG: 50 TABLET, FILM COATED ORAL at 17:08

## 2021-12-19 RX ADMIN — CARVEDILOL 3.12 MG: 3.12 TABLET, FILM COATED ORAL at 17:08

## 2021-12-19 RX ADMIN — CARBIDOPA AND LEVODOPA 2 TABLET: 25; 100 TABLET ORAL at 12:05

## 2021-12-19 RX ADMIN — BUSPIRONE HYDROCHLORIDE 5 MG: 5 TABLET ORAL at 10:03

## 2021-12-19 RX ADMIN — PANTOPRAZOLE SODIUM 40 MG: 40 TABLET, DELAYED RELEASE ORAL at 05:59

## 2021-12-19 RX ADMIN — APIXABAN 2.5 MG: 2.5 TABLET, FILM COATED ORAL at 17:08

## 2021-12-19 RX ADMIN — ARIPIPRAZOLE 20 MG: 5 TABLET ORAL at 10:09

## 2021-12-19 RX ADMIN — BUSPIRONE HYDROCHLORIDE 5 MG: 5 TABLET ORAL at 17:08

## 2021-12-19 RX ADMIN — ATORVASTATIN CALCIUM 40 MG: 40 TABLET, FILM COATED ORAL at 21:22

## 2021-12-19 RX ADMIN — CARBIDOPA AND LEVODOPA 2 TABLET: 25; 100 TABLET ORAL at 10:03

## 2021-12-19 RX ADMIN — GABAPENTIN 100 MG: 100 CAPSULE ORAL at 21:22

## 2021-12-19 RX ADMIN — Medication 10 ML: at 17:08

## 2021-12-19 NOTE — PROGRESS NOTES
Problem: Falls - Risk of  Goal: *Absence of Falls  Description: Document Raven Rivas Fall Risk and appropriate interventions in the flowsheet. Outcome: Progressing Towards Goal  Note: Fall Risk Interventions:  Mobility Interventions: Assess mobility with egress test,Bed/chair exit alarm,Communicate number of staff needed for ambulation/transfer    Mentation Interventions: Adequate sleep, hydration, pain control,Door open when patient unattended,Bed/chair exit alarm    Medication Interventions: Assess postural VS orthostatic hypotension,Bed/chair exit alarm,Evaluate medications/consider consulting pharmacy    Elimination Interventions: Bed/chair exit alarm,Elevated toilet seat,Call light in reach       Pt is A/O x 4. VSS on 1L NC satting 96% and above. Pt denies any pain. Throughout her care and assessment pt was calm and cooperative and maintain a friendly demeanor. Pt has been compliant with all her medications. Pt currently sleeping. Tele-monitoring in place. Safety precautions in place. Call light within reach. Will continue to monitor.

## 2021-12-19 NOTE — PROGRESS NOTES
Hospitalist Progress Note          Taiwo Mosqueda MD  Please call  and page for questions. Call physician on-call through the  7pm-7am    Daily Progress Note: 12/19/2021    Primary care provider:Lawyer Escobar Arce DO    Date of admission: 12/10/2021  3:28 AM    Admission summery and hospital course:  HPI: 81 y.o. 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   Per record, she was picked up from [facility] where she was found to be hypoxic to the fifties on her normal 2 L nasal cannula.    She was recently admitted from 10/29 to 11/3 for sepsis 2/2 proteus UTI.   In the ED, SBP was elevated to one nineties.  Per record, SPO2 is 100% on BiPAP.  Labs were significant for WBC 15.7, creatinine 1.94, CO2 20, troponin 2,017, and probnp 18,236.  Rapid covid is negative. Subjective:     Patient seen and examined       Patient states that she does not use oxygen at the facility,states that breathing is better today  Will wean off oxygen     Assessment/Plan:   Acute Hypoxic Respiratory Failure  Pulmonary edema-Acute on chronic diastolic heart failure   Left lower lobe airspace disease with effusion  Leukocytosis resolved, afebrile.     Continue supplemental oxygen.    Completed 5 days azithromycin and ceftriaxone.    Follow cultures, strep pneumonia antigen was negative.    CRP was 0.82, rapid Covid 19 test was negative, Legionella pneumonia antigen was negative RSV by PCR was negative and influenza A is negative.     -Echo 8/2021-Moderate (grade 2) left ventricular diastolic dysfunction.   Received IV diuretics-transition to oral diuretics, back to facility once off oxygen and if renal function remains stable.       Anemia,  acute on chronic  History of iron deficiency anemia. Appreciated GI input, patient had  colonoscopy and EGD on -results reviewed  -Hb stable       Hypertensive Emergency-resolved  HTN  -continue current meds  -last cardiac cath 2021-results reviewed     Dementia and mood disorder  Continue melatonin, cymbalta, and buspar.     Parkinsons: continue home sinemet.   Gastroesophageal reflux disease: continue pantoprazole.     Paroxysmal atrial fibrillation; Rate controlled with carvedilol. Resume eliquis     See orders for other plans. VTE prophylaxis: on apixaban. Code status: DNR  Discussed plan of care with Patient/Family and Nurse. MPOA: Ciaran Come listed as relative. Review of Systems:     Review of Systems:  As per HPI  Objective:   Physical Exam:     Visit Vitals  BP (!) 114/44   Pulse 65   Temp 98.5 °F (36.9 °C)   Resp 13   Wt 78.7 kg (173 lb 8 oz)   SpO2 97%   BMI 28.87 kg/m²    O2 Flow Rate (L/min): 2 l/min O2 Device: Nasal cannula    Temp (24hrs), Av.5 °F (36.9 °C), Min:98 °F (36.7 °C), Max:98.8 °F (37.1 °C)    701 - 1900  In: -   Out: 8459 [IIGAP:6337]   1901 - 700  In: -   Out: 9159 [Urine:1340]    General:  Alert, cooperative, no distress, appears stated age.    Lungs:    decreased at bases b/l ,no wheezing     Chest wall:  No tenderness or deformity. Heart:  Regular rate and rhythm, S1, S2 normal, no murmur. Abdomen:   Soft, non-tender. Bowel sounds normal.    Extremities:  no LE edema.    Neurologic:  .  She follows command.  Alert and oriented 2-3        Data Review:       Recent Days:  Recent Labs     21  0433   WBC 5.1   HGB 8.5*   HCT 26.6*        Recent Labs     21  0317 21  0433    140   K 3.9 3.6    107   CO2 28 27   * 110*   BUN 27* 24*   CREA 1.54* 1.26*   CA 8.4* 8.9   MG 1.8  --    ALB  --  3.2*   ALT  --  7*     No results for input(s): PH, PCO2, PO2, HCO3, FIO2 in the last 72 hours.     24 Hour Results:  Recent Results (from the past 24 hour(s))   METABOLIC PANEL, BASIC    Collection Time: 12/19/21  3:17 AM   Result Value Ref Range    Sodium 138 136 - 145 mmol/L    Potassium 3.9 3.5 - 5.1 mmol/L    Chloride 104 97 - 108 mmol/L    CO2 28 21 - 32 mmol/L    Anion gap 6 5 - 15 mmol/L    Glucose 109 (H) 65 - 100 mg/dL    BUN 27 (H) 6 - 20 MG/DL    Creatinine 1.54 (H) 0.55 - 1.02 MG/DL    BUN/Creatinine ratio 18 12 - 20      GFR est AA 39 (L) >60 ml/min/1.73m2    GFR est non-AA 32 (L) >60 ml/min/1.73m2    Calcium 8.4 (L) 8.5 - 10.1 MG/DL   MAGNESIUM    Collection Time: 12/19/21  3:17 AM   Result Value Ref Range    Magnesium 1.8 1.6 - 2.4 mg/dL   NT-PRO BNP    Collection Time: 12/19/21  3:17 AM   Result Value Ref Range    NT pro-BNP 8,184 (H) <450 PG/ML       Problem List:  Problem List as of 12/19/2021 Date Reviewed: 8/26/2021          Codes Class Noted - Resolved    CAP (community acquired pneumonia) ICD-10-CM: J18.9  ICD-9-CM: 486  12/10/2021 - Present        Acute on chronic respiratory failure with hypoxia ([facility] ICD-10-CM: J96.21  ICD-9-CM: 518.84, 799.02  12/10/2021 - Present        Cellulitis and abscess of lower extremity ICD-10-CM: L03.119, L02.419  ICD-9-CM: 682.6  10/29/2021 - Present        SOB (shortness of breath) ICD-10-CM: R06.02  ICD-9-CM: 786.05  4/23/2021 - Present        UTI (urinary tract infection) ICD-10-CM: N39.0  ICD-9-CM: 599.0  4/9/2021 - Present        CHF exacerbation ([facility] ICD-10-CM: I50.9  ICD-9-CM: 428.0  9/25/2020 - Present        Weakness ICD-10-CM: R53.1  ICD-9-CM: 780.79  5/4/2020 - Present        Generalized weakness ICD-10-CM: R53.1  ICD-9-CM: 780.79  4/30/2020 - Present        Carotid artery stenosis, symptomatic, left ICD-10-CM: A57.87  ICD-9-CM: 433.10  7/26/2019 - Present        HTN (hypertension) ICD-10-CM: I10  ICD-9-CM: 401.9  7/17/2019 - Present        Acute ischemic stroke ([facility] ICD-10-CM: I63.9  ICD-9-CM: 434.91  7/12/2019 - Present Fall ICD-10-CM: W19. Lyn Fritz  ICD-9-CM: E888.9  12/24/2018 - Present        CKD (chronic kidney disease), stage III ([facility] ICD-10-CM: N18.30  ICD-9-CM: 585.3  7/8/2015 - Present        Edema ICD-10-CM: R60.9  ICD-9-CM: 782.3  5/6/2015 - Present        Diabetes mellitus ([facility] ICD-10-CM: E11.9  ICD-9-CM: 250.00  5/6/2015 - Present        Postoperative anemia due to acute blood loss ICD-10-CM: D62  ICD-9-CM: 285.1  2/14/2015 - Present        S/P CABG (coronary artery bypass graft) ICD-10-CM: Z95.1  ICD-9-CM: V45.81  2/13/2015 - Present        Syncope ICD-10-CM: R55  ICD-9-CM: 780.2  2/9/2015 - Present        Bradycardia ICD-10-CM: R00.1  ICD-9-CM: 427.89  2/9/2015 - Present        Acute kidney injury ([facility] ICD-10-CM: N17.9  ICD-9-CM: 584.9  2/9/2015 - Present        Anemia ICD-10-CM: D64.9  ICD-9-CM: 285.9  9/9/2014 - Present        GI bleed ICD-10-CM: K92.2  ICD-9-CM: 578.9  9/9/2014 - Present        AF (atrial fibrillation) (HCC) ICD-10-CM: I48.91  ICD-9-CM: 427.31  6/20/2014 - Present        Hypotension ICD-10-CM: I95.9  ICD-9-CM: 458.9  6/3/2014 - Present        Coronary artery disease ICD-10-CM: I25.10  ICD-9-CM: 414.00  6/3/2014 - Present    Overview Signed 2/10/2015 11:06 AM by Alissa Escobar     2007 PCI x2 to LAD with STEPHAN             S/P coronary artery stent placement ICD-10-CM: Z95.5  ICD-9-CM: V45.82  6/3/2014 - Present        Renal failure ICD-10-CM: N19  ICD-9-CM: 104  6/3/2014 - Present        Elevated troponin ICD-10-CM: R77.8  ICD-9-CM: 790.6  6/3/2014 - Present        Pericardial effusion ICD-10-CM: I31.3  ICD-9-CM: 423.9  6/3/2014 - Present        Lactic acidosis ICD-10-CM: E87.2  ICD-9-CM: 276.2  6/3/2014 - Present        RESOLVED: CHF (congestive heart failure) (HCC) ICD-10-CM: I50.9  ICD-9-CM: 428.0  9/22/2020 - 8/3/2021              Medications reviewed  Current Facility-Administered Medications   Medication Dose Route Frequency    hydrALAZINE (APRESOLINE) 20 mg/mL injection 10 mg  10 mg IntraVENous Q6H PRN    furosemide (LASIX) tablet 40 mg  40 mg Oral DAILY    sodium chloride (NS) flush 5-40 mL  5-40 mL IntraVENous Q8H    sodium chloride (NS) flush 5-40 mL  5-40 mL IntraVENous PRN    ARIPiprazole (ABILIFY) tablet 20 mg  20 mg Oral DAILY    hydrALAZINE (APRESOLINE) tablet 50 mg  50 mg Oral TID    sodium chloride (NS) flush 5-40 mL  5-40 mL IntraVENous Q8H    sodium chloride (NS) flush 5-40 mL  5-40 mL IntraVENous PRN    acetaminophen (TYLENOL) tablet 650 mg  650 mg Oral Q6H PRN    Or    acetaminophen (TYLENOL) suppository 650 mg  650 mg Rectal Q6H PRN    polyethylene glycol (MIRALAX) packet 17 g  17 g Oral DAILY PRN    ondansetron (ZOFRAN ODT) tablet 4 mg  4 mg Oral Q8H PRN    Or    ondansetron (ZOFRAN) injection 4 mg  4 mg IntraVENous Q6H PRN    apixaban (ELIQUIS) tablet 2.5 mg  2.5 mg Oral BID    amLODIPine (NORVASC) tablet 2.5 mg  2.5 mg Oral DAILY    atorvastatin (LIPITOR) tablet 40 mg  40 mg Oral QHS    busPIRone (BUSPAR) tablet 5 mg  5 mg Oral TID    carbidopa-levodopa (SINEMET)  mg per tablet 2 Tablet  2 Tablet Oral QID    carvediloL (COREG) tablet 3.125 mg  3.125 mg Oral BID WITH MEALS    DULoxetine (CYMBALTA) capsule 120 mg  120 mg Oral DAILY    gabapentin (NEURONTIN) capsule 100 mg  100 mg Oral QHS    melatonin tablet 6 mg  6 mg Oral QHS    pantoprazole (PROTONIX) tablet 40 mg  40 mg Oral ACB       Care Plan discussed with: Patient/Family, Nurse and         Leyla Muller MD

## 2021-12-20 LAB
ANION GAP SERPL CALC-SCNC: 6 MMOL/L (ref 5–15)
BUN SERPL-MCNC: 30 MG/DL (ref 6–20)
BUN/CREAT SERPL: 19 (ref 12–20)
CALCIUM SERPL-MCNC: 9.1 MG/DL (ref 8.5–10.1)
CHLORIDE SERPL-SCNC: 104 MMOL/L (ref 97–108)
CO2 SERPL-SCNC: 27 MMOL/L (ref 21–32)
CREAT SERPL-MCNC: 1.55 MG/DL (ref 0.55–1.02)
GLUCOSE BLD STRIP.AUTO-MCNC: 148 MG/DL (ref 65–117)
GLUCOSE SERPL-MCNC: 114 MG/DL (ref 65–100)
POTASSIUM SERPL-SCNC: 3.8 MMOL/L (ref 3.5–5.1)
SERVICE CMNT-IMP: ABNORMAL
SODIUM SERPL-SCNC: 137 MMOL/L (ref 136–145)

## 2021-12-20 PROCEDURE — 74011250637 HC RX REV CODE- 250/637: Performed by: FAMILY MEDICINE

## 2021-12-20 PROCEDURE — 36415 COLL VENOUS BLD VENIPUNCTURE: CPT

## 2021-12-20 PROCEDURE — 80048 BASIC METABOLIC PNL TOTAL CA: CPT

## 2021-12-20 PROCEDURE — 94760 N-INVAS EAR/PLS OXIMETRY 1: CPT

## 2021-12-20 PROCEDURE — 82962 GLUCOSE BLOOD TEST: CPT

## 2021-12-20 PROCEDURE — 97535 SELF CARE MNGMENT TRAINING: CPT

## 2021-12-20 PROCEDURE — 65660000000 HC RM CCU STEPDOWN

## 2021-12-20 PROCEDURE — 97530 THERAPEUTIC ACTIVITIES: CPT

## 2021-12-20 PROCEDURE — 97116 GAIT TRAINING THERAPY: CPT

## 2021-12-20 PROCEDURE — 74011250637 HC RX REV CODE- 250/637: Performed by: INTERNAL MEDICINE

## 2021-12-20 PROCEDURE — 74011250637 HC RX REV CODE- 250/637: Performed by: HOSPITALIST

## 2021-12-20 PROCEDURE — 77010033678 HC OXYGEN DAILY

## 2021-12-20 RX ORDER — DEXTROSE 50 % IN WATER (D50W) INTRAVENOUS SYRINGE
12.5-25 AS NEEDED
Status: DISCONTINUED | OUTPATIENT
Start: 2021-12-20 | End: 2021-12-21 | Stop reason: HOSPADM

## 2021-12-20 RX ORDER — INSULIN LISPRO 100 [IU]/ML
INJECTION, SOLUTION INTRAVENOUS; SUBCUTANEOUS
Status: DISCONTINUED | OUTPATIENT
Start: 2021-12-20 | End: 2021-12-21 | Stop reason: HOSPADM

## 2021-12-20 RX ORDER — MAGNESIUM SULFATE 100 %
4 CRYSTALS MISCELLANEOUS AS NEEDED
Status: DISCONTINUED | OUTPATIENT
Start: 2021-12-20 | End: 2021-12-21 | Stop reason: HOSPADM

## 2021-12-20 RX ADMIN — PANTOPRAZOLE SODIUM 40 MG: 40 TABLET, DELAYED RELEASE ORAL at 07:23

## 2021-12-20 RX ADMIN — ATORVASTATIN CALCIUM 40 MG: 40 TABLET, FILM COATED ORAL at 22:10

## 2021-12-20 RX ADMIN — BUSPIRONE HYDROCHLORIDE 5 MG: 5 TABLET ORAL at 17:04

## 2021-12-20 RX ADMIN — HYDRALAZINE HYDROCHLORIDE 50 MG: 50 TABLET, FILM COATED ORAL at 08:50

## 2021-12-20 RX ADMIN — BUSPIRONE HYDROCHLORIDE 5 MG: 5 TABLET ORAL at 08:50

## 2021-12-20 RX ADMIN — HYDRALAZINE HYDROCHLORIDE 50 MG: 50 TABLET, FILM COATED ORAL at 17:04

## 2021-12-20 RX ADMIN — HYDRALAZINE HYDROCHLORIDE 50 MG: 50 TABLET, FILM COATED ORAL at 22:09

## 2021-12-20 RX ADMIN — BUSPIRONE HYDROCHLORIDE 5 MG: 5 TABLET ORAL at 22:10

## 2021-12-20 RX ADMIN — APIXABAN 2.5 MG: 2.5 TABLET, FILM COATED ORAL at 08:49

## 2021-12-20 RX ADMIN — AMLODIPINE BESYLATE 2.5 MG: 5 TABLET ORAL at 08:50

## 2021-12-20 RX ADMIN — FUROSEMIDE 40 MG: 40 TABLET ORAL at 08:50

## 2021-12-20 RX ADMIN — Medication 6 MG: at 22:09

## 2021-12-20 RX ADMIN — CARVEDILOL 3.12 MG: 3.12 TABLET, FILM COATED ORAL at 08:49

## 2021-12-20 RX ADMIN — CARBIDOPA AND LEVODOPA 2 TABLET: 25; 100 TABLET ORAL at 22:10

## 2021-12-20 RX ADMIN — APIXABAN 2.5 MG: 2.5 TABLET, FILM COATED ORAL at 17:04

## 2021-12-20 RX ADMIN — CARBIDOPA AND LEVODOPA 2 TABLET: 25; 100 TABLET ORAL at 17:04

## 2021-12-20 RX ADMIN — DULOXETINE 120 MG: 60 CAPSULE, DELAYED RELEASE ORAL at 08:50

## 2021-12-20 RX ADMIN — Medication 10 ML: at 13:11

## 2021-12-20 RX ADMIN — GABAPENTIN 100 MG: 100 CAPSULE ORAL at 22:10

## 2021-12-20 RX ADMIN — ARIPIPRAZOLE 20 MG: 5 TABLET ORAL at 08:49

## 2021-12-20 RX ADMIN — CARBIDOPA AND LEVODOPA 2 TABLET: 25; 100 TABLET ORAL at 08:49

## 2021-12-20 RX ADMIN — CARBIDOPA AND LEVODOPA 2 TABLET: 25; 100 TABLET ORAL at 13:10

## 2021-12-20 NOTE — PROGRESS NOTES
Bedside shift change report given to Viv Espana RN (oncoming nurse) by Lisette Gilman RN (offgoing nurse). Report included the following information SBAR, Kardex, ED Summary, Intake/Output, MAR and Recent Results.

## 2021-12-20 NOTE — PROGRESS NOTES
Transition of Care Plan  · RUR- 23%   · DISPOSITION: The disposition plan is SNF  ? SNF: [facility] #[id number]/ Start Date:12/17 End Date:12/20 Reviewer: Raphael Mansfield   · F/U with PCP/Specialist    · Transport: [facility]  · j  · Should this pt not be ready for d/c today, we will need to seek another [facility] tomorrow.   Media Netter

## 2021-12-20 NOTE — PROGRESS NOTES
[facility]  Hospitalist Group                                                                                          Hospitalist Progress Note  Andrew Feldman MD  Answering service: [phone] OR [phone] from in house phone        Date of Service:  2021  NAME:  Austyn Meredith  :  1940  MRN:  [id number]      Admission Summary:     A 81 y.o. 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   Per record, she was picked up from [facility] where she was found to be hypoxic to the fifties on her normal 2 L nasal cannula.    She was recently admitted from 10/29 to 11/3 for sepsis 2/2 proteus UTI.   In the ED, SBP was elevated to one nineties.  Per record, SPO2 is 100% on BiPAP.  Labs were significant for WBC 15.7, creatinine 1.94, CO2 20, troponin 2,017, and probnp 18,236.  Rapid covid is negative.     Interval history / Subjective:     She said she feels better, no left side chest pain, nausea or vomiting     Assessment & Plan:     Acute hypoxic respiratory failure due to acute pulmonary edema and possible pneumonia  -improving, SpO2 100% on 2 l/m, monitor pulse ox  -completed abx, on lasix 40 mg daily, prn duo neb  -leukocytosis improving, afebrile,   -repeated chest x ray on  pulmonary edema with bibasilar consolidation without significant change.     Acute on chronic diastolic CHF NYHA Class IV  -improving  -on lasix, coreg, hydralazine,   -not on ACEi/ARB due to renal insufficiency     CKD stage III  -creatinine stable  -avoid nephrotoxin   -monitor renal function     A Fib, normal sinus rhythm now  -on coreg and eliquis  -continue cardiac monitoring    Hx of CAD s/p CABG  -no left side chest pain  -on eliquis, lipitor, coreg and lipitor    T2DM  -A1c 6.3  -on sliding scale, monitor finger stick glucose     HTN  -BP normal, on hydralazine, coreg, lasix, monitor BP    Hx of CVA  -stable  -continue eliquis and lipitor    Anemia of chronic disease   -stable, Hgb 8.5  -monitor H/H    Hx of GERD  -stable, on protonix    Hx of anxiety/depression  -cymbalta, aripprazole and buspar    Hx of parkinson's disease   -on sinemet  -PT/OT    Dementia   -conscious and alert, oriented to place and person  -continue neuro check and supportive care        Code status: DNR  DVT prophylaxis: [address] discussed with: Patient/Family, Nurse and   Anticipated Disposition: [facility]  Anticipated Discharge: 24 hours to 50 hours     Hospital Problems  Date Reviewed: 8/26/2021          Codes Class Noted POA    CAP (community acquired pneumonia) ICD-10-CM: J18.9  ICD-9-CM: 473  12/10/2021 Unknown        Acute on chronic respiratory failure with hypoxia ([facility] ICD-10-CM: J96.21  ICD-9-CM: 518.84, 799.02  12/10/2021 Unknown        Elevated troponin ICD-10-CM: R77.8  ICD-9-CM: 790.6  6/3/2014 Unknown                 Vital Signs:    Last 24hrs VS reviewed since prior progress note. Most recent are:  Visit Vitals  BP (!) 127/47 (BP 1 Location: Right upper arm, BP Patient Position: Sitting)   Pulse (!) 56   Temp 98.8 °F (37.1 °C)   Resp 12   Ht 5' 5\" (1.651 m)   Wt 78.7 kg (173 lb 8 oz)   SpO2 100%   BMI 28.87 kg/m²         Intake/Output Summary (Last 24 hours) at 12/20/2021 1701  Last data filed at 12/20/2021 1045  Gross per 24 hour   Intake    Output 1700 ml   Net -1700 ml        Physical Examination:     I had a face to face encounter with this patient and independently examined them on 12/20/2021 as outlined below:          Constitutional:  No acute distress, cooperative, pleasant    ENT:  Oral mucosa moist, oropharynx benign. Resp:  Decreased bronchial breath sound bilaterally. No wheezing/rhonchi/rales.  No accessory muscle use   CV:  Regular rhythm, normal rate, no murmurs, gallops, rubs    GI:  Soft, non distended, non tender. normoactive bowel sounds, no hepatosplenomegaly     Musculoskeletal:  No edema,     Neurologic:  Conscious and alert, oriented to place and person, moves all extremities, CN II-XII reviewed            Data Review:    Review and/or order of clinical lab test  Review and/or order of tests in the radiology section of CPT  Review and/or order of tests in the medicine section of CPT      Labs:   No results for input(s): WBC, HGB, HCT, PLT, HGBEXT, HCTEXT, PLTEXT in the last 72 hours. Recent Labs     12/20/21  0323 12/19/21  0317    138   K 3.8 3.9    104   CO2 27 28   BUN 30* 27*   CREA 1.55* 1.54*   * 109*   CA 9.1 8.4*   MG  --  1.8     No results for input(s): ALT, AP, TBIL, TBILI, TP, ALB, GLOB, GGT, AML, LPSE in the last 72 hours. No lab exists for component: SGOT, GPT, AMYP, HLPSE  No results for input(s): INR, PTP, APTT, INREXT in the last 72 hours. No results for input(s): FE, TIBC, PSAT, FERR in the last 72 hours. Lab Results   Component Value Date/Time    Folate 7.8 12/14/2021 04:40 AM      No results for input(s): PH, PCO2, PO2 in the last 72 hours. No results for input(s): CPK, CKNDX, TROIQ in the last 72 hours.     No lab exists for component: CPKMB  Lab Results   Component Value Date/Time    Cholesterol, total 148 09/23/2020 04:27 AM    HDL Cholesterol 52 09/23/2020 04:27 AM    LDL, calculated 83.8 09/23/2020 04:27 AM    Triglyceride 61 09/23/2020 04:27 AM    CHOL/HDL Ratio 2.8 09/23/2020 04:27 AM     Lab Results   Component Value Date/Time    Glucose (POC) 156 (H) 12/18/2021 11:53 AM    Glucose (POC) 96 12/18/2021 10:18 AM    Glucose (POC) 120 (H) 04/24/2021 04:54 PM    Glucose (POC) 176 (H) 04/24/2021 11:16 AM    Glucose (POC) 111 (H) 04/24/2021 08:45 AM     Lab Results   Component Value Date/Time    Color YELLOW/STRAW 10/29/2021 07:16 PM    Appearance CLOUDY (A) 10/29/2021 07:16 PM    Specific gravity 1.009 10/29/2021 07:16 PM    pH (UA) 5.0 10/29/2021 07:16 PM    Protein 100 (A) 10/29/2021 07:16 PM    Glucose Negative 10/29/2021 07:16 PM    Ketone Negative 10/29/2021 07:16 PM    Bilirubin Negative 10/29/2021 07:16 PM    Urobilinogen 0.2 10/29/2021 07:16 PM    Nitrites Negative 10/29/2021 07:16 PM    Leukocyte Esterase MODERATE (A) 10/29/2021 07:16 PM    Epithelial cells MODERATE (A) 10/29/2021 07:16 PM    Bacteria 2+ (A) 10/29/2021 07:16 PM    WBC 20-50 10/29/2021 07:16 PM    RBC 0-5 10/29/2021 07:16 PM         Medications Reviewed:     Current Facility-Administered Medications   Medication Dose Route Frequency    hydrALAZINE (APRESOLINE) 20 mg/mL injection 10 mg  10 mg IntraVENous Q6H PRN    furosemide (LASIX) tablet 40 mg  40 mg Oral DAILY    sodium chloride (NS) flush 5-40 mL  5-40 mL IntraVENous Q8H    sodium chloride (NS) flush 5-40 mL  5-40 mL IntraVENous PRN    ARIPiprazole (ABILIFY) tablet 20 mg  20 mg Oral DAILY    hydrALAZINE (APRESOLINE) tablet 50 mg  50 mg Oral TID    sodium chloride (NS) flush 5-40 mL  5-40 mL IntraVENous Q8H    sodium chloride (NS) flush 5-40 mL  5-40 mL IntraVENous PRN    acetaminophen (TYLENOL) tablet 650 mg  650 mg Oral Q6H PRN    Or    acetaminophen (TYLENOL) suppository 650 mg  650 mg Rectal Q6H PRN    polyethylene glycol (MIRALAX) packet 17 g  17 g Oral DAILY PRN    ondansetron (ZOFRAN ODT) tablet 4 mg  4 mg Oral Q8H PRN    Or    ondansetron (ZOFRAN) injection 4 mg  4 mg IntraVENous Q6H PRN    apixaban (ELIQUIS) tablet 2.5 mg  2.5 mg Oral BID    amLODIPine (NORVASC) tablet 2.5 mg  2.5 mg Oral DAILY    atorvastatin (LIPITOR) tablet 40 mg  40 mg Oral QHS    busPIRone (BUSPAR) tablet 5 mg  5 mg Oral TID    carbidopa-levodopa (SINEMET)  mg per tablet 2 Tablet  2 Tablet Oral QID    carvediloL (COREG) tablet 3.125 mg  3.125 mg Oral BID WITH MEALS    DULoxetine (CYMBALTA) capsule 120 mg  120 mg Oral DAILY  gabapentin (NEURONTIN) capsule 100 mg  100 mg Oral QHS    melatonin tablet 6 mg  6 mg Oral QHS    pantoprazole (PROTONIX) tablet 40 mg  40 mg Oral ACB     ______________________________________________________________________  EXPECTED LENGTH OF STAY: 3d 19h  ACTUAL LENGTH OF STAY:          10                 Ellen Mascorro MD

## 2021-12-20 NOTE — PROGRESS NOTES
[address]  [address] [facility] [address] [facility], [address]  [phone]                          GI PROGRESS NOTE     Patient Name: Rosemarie Goldman      : 1940      MRN: [id number]  Admit Date: 12/10/2021  Today's Date: 2021     Subjective:      Eating clear liquid breakfast. Denies abdominal pain or nausea. Has not had bowel movement since admission, does not recall seeing any bleeding.       Objective:      Blood pressure (!) 170/68, pulse (!) 59, temperature 98.2 °F (36.8 °C), resp. rate 18, weight 75.8 kg (167 lb 1.7 oz), SpO2 97 %.     Physical Exam:  General appearance: cooperative, no distress  Skin: Pale No jaundice,  rashes   HEENT: Sclerae anicteric. Cardiovascular: Regular rate and rhythm. Ava Graven Respiratory: Comfortable breathing . Clear breath sounds   GI: Abdomen nondistended, soft, and nontender. Normal active bowel sounds. Rectal: Deferred   Musculoskeletal: No pitting edema of the lower legs.    Neurological: Awake pleasant conversable. Memory deficit  - limited historian  Psychiatric: Mood appropriate with good judgement.  No anxiety or agitation        Data Review:    Recent Results          Recent Results (from the past 24 hour(s))   SAMPLES BEING HELD     Collection Time: 21  4:18 AM   Result Value Ref Range     SAMPLES BEING HELD 1lav,1gold       COMMENT           Add-on orders for these samples will be processed based on acceptable specimen integrity and analyte stability, which may vary by analyte.               Assessment / Plan :      Anemia  H/o duodenal ulcer (NSAID related)   We are asked to see Mrs. Frazier for acute on chronic anemia with downtrending hemoglobin since admission.  The patient is not a reliable historian and per nursing, she has not had any melena or hematochezia. Ora Armenta does have history of duodenal bulb ulcer actively bleeding at time of the of EGD in  although this was likely related to frequent NSAID use.  It appears she has been on PPI since this time. Sarah Helton is on chronic anticoagulation with Eliquis  Despite no obvious signs of active bleeding, given her history will consider EGD for evaluation of recurrent ulcer exacerbated by anticoagulation.  Would be most ideal patient off Eliquis for 2-3 days prior to the procedure.       No labs today - hgb 7.7 (12/15)     - Plan for EGD/Colon Friday (12/17)  - Hold Eliquis- last dose (12/14) at 1819  - clear liquid diet today then NPO after midnight  - Start bowel prep at 1500   - Continue to monitor hemoglobin, transfuse for< 7  - Continue PPI         Patient Active Hospital Problem List:   Active Problems:    Elevated troponin (6/3/2014)       CAP (community acquired pneumonia) (12/10/2021)       Acute on chronic respiratory failure with hypoxia ([facility] (12/10/2021)        Aubrey Jenkins NP     ATTENDING ADDENDUM OF TABBY NOTE:  I saw and evaluated Merlinda Muscogee on the above date of service and discussed the care with the nurse practitioner. I agree with the findings and plan as documented in the note above and have edited it to reflect my findings and plan.

## 2021-12-20 NOTE — ANESTHESIA POSTPROCEDURE EVALUATION
Post-Anesthesia Evaluation and Assessment    Patient: Lynnette Goel MRN: [id number]  SSN: [id number]    YOB: 1940  Age: [de-identified] y.o. Sex: female      I have evaluated the patient and they are stable and ready for discharge from the PACU. Cardiovascular Function/Vital Signs  Visit Vitals  BP (!) 127/50 (BP 1 Location: Right upper arm, BP Patient Position: Sitting)   Pulse (!) 56   Temp 36.8 °C (98.3 °F)   Resp 17   Ht 5' 5\" (1.651 m)   Wt 78.7 kg (173 lb 8 oz)   SpO2 100%   BMI 28.87 kg/m²       Patient is status post MAC anesthesia for Procedure(s):  ESOPHAGOGASTRODUODENOSCOPY (EGD)  COLONOSCOPY   :-  ENDOSCOPIC POLYPECTOMY. Nausea/Vomiting: None    Postoperative hydration reviewed and adequate. Pain:  Pain Scale 1: Numeric (0 - 10) (12/20/21 0851)  Pain Intensity 1: 0 (12/20/21 0851)   Managed    Neurological Status:   Neuro  Neurologic State: Alert (12/20/21 8041)  Orientation Level: Oriented X4;Other (Comment) (forgetful) (12/20/21 0851)  Cognition: Follows commands (12/20/21 0851)  Speech: Clear (12/20/21 0851)  Assessment L Pupil: Brisk;Round (12/11/21 2200)  Assessment R Pupil: Brisk;Round (12/11/21 2200)  LUE Motor Response: Purposeful (12/20/21 0851)  LLE Motor Response: Purposeful (12/20/21 0851)  RUE Motor Response: Purposeful (12/20/21 0851)  RLE Motor Response: Purposeful (12/20/21 0851)   At baseline    Mental Status, Level of Consciousness: Alert and  oriented to person, place, and time    Pulmonary Status:   O2 Device: Nasal cannula (12/20/21 1045)   Adequate oxygenation and airway patent    Complications related to anesthesia: None    Post-anesthesia assessment completed. No concerns    Signed By: Kirsten Koroma MD     December 20, 2021              Procedure(s):  ESOPHAGOGASTRODUODENOSCOPY (EGD)  COLONOSCOPY   :-  ENDOSCOPIC POLYPECTOMY.     MAC    <BSHSIANPOST>    INITIAL Post-op Vital signs:   Vitals Value Taken Time   /50 12/20/21 1117   Temp 36.8 °C (98.3 °F) 12/20/21 1116   Pulse 53 12/20/21 1256   Resp 16 12/20/21 1256   SpO2 100 % 12/20/21 1256   Vitals shown include unvalidated device data.

## 2021-12-20 NOTE — PROGRESS NOTES
Problem: Mobility Impaired (Adult and Pediatric)  Goal: *Acute Goals and Plan of Care (Insert Text)  Description: FUNCTIONAL STATUS PRIOR TO ADMISSION: The patient was functional at the wheelchair level and was modified independent for transfers to the wheelchair per pt report. Pt denies any falls in the last year. HOME SUPPORT PRIOR TO ADMISSION: The patient lived in [facility] due to her apartment lease ending in November 2021 and is looking for new living arrangements. Pt reports she has a niece in the area that assist her. Physical Therapy Goals  Revised 12/20/2021  1. Patient will move from supine to sit and sit to supine , scoot up and down, and roll side to side in bed with minimal assistance/contact guard assist within 7 day(s). 2.  Patient will transfer from bed to chair and chair to bed with minimal assistance/contact guard assist using the least restrictive device within 7 day(s). 3.  Patient will perform sit to stand with minimal assistance/contact guard assist within 7 day(s). 4.  Patient will ambulate with minimal assistance/contact guard assist for 3 feet with the least restrictive device within 7 day(s). Physical Therapy Goals  Initiated 12/11/2021  1. Patient will move from supine to sit and sit to supine  in bed with moderate assistance  within 7 day(s). 2.  Patient will transfer from bed to chair and chair to bed with moderate assistance  using the least restrictive device within 7 day(s). 3.  Patient will perform sit to stand with moderate assistance  within 7 day(s).        Outcome: Progressing Towards Goal   PHYSICAL THERAPY TREATMENT: WEEKLY REASSESSMENT  Patient: Anushka Ponce (49 y.o. female)  Date: 12/20/2021  Primary Diagnosis: Acute on chronic respiratory failure with hypoxia (HCC) [J96.21]  Elevated troponin [R77.8]  CAP (community acquired pneumonia) [J18.9]  Procedure(s) (LRB):  ESOPHAGOGASTRODUODENOSCOPY (EGD) (N/A)  COLONOSCOPY   :- (N/A)  ENDOSCOPIC POLYPECTOMY (N/A) 3 Days Post-Op   Precautions:   Contact,Bed Alarm,Fall      ASSESSMENT  Patient continues with skilled PT services and is progressing towards goals. Pt was transferred to [facility] from a general medical floor on 12/18/21 secondary to pulmonary edema and need for BIPAP. Pt was on 2 liters throughout session and Sp02 was stable in the upper 90s. Pt continues to be limited by: impaired standing balance and tendency for a posterior lean during and after sit to stand. Worked a great deal on sit to stand technique, multiple reps. With verbal cues to: scoot to the edge of the seat, position her legs with her knees bent and a wide base of support, using three good leans (nose over toes), she was able to come to stand with just min assist, otherwise mod assist as well as an ongoing posterior lean. Anticipate that carry over is marginal, pt being dependent on the person assisting her for cues. Continue to recommend rehab at the SNF level. Patient's progression toward goals since last assessment: gains made, goals upgraded    Current Level of Function Impacting Discharge (mobility/balance): min to mod assist, impaired standing balance. Functional Outcome Measure: The patient scored 7/28 on the Tinetti outcome measure which is indicative of high fall risk. Other factors to consider for discharge: h/o Parkinson's Disease and CVA          PLAN :  Goals have been updated based on progression since last assessment. Patient continues to benefit from skilled intervention to address the above impairments. Recommendations and Planned Interventions: bed mobility training, transfer training, gait training, therapeutic exercises, patient and family training/education, and therapeutic activities      Frequency/Duration: Patient will be followed by physical therapy:  4 times a week to address goals.     Recommendation for discharge: (in order for the patient to meet his/her long term goals)  Therapy up to 5 days/week in SNF setting    This discharge recommendation:  A follow-up discussion with the attending provider and/or case management is planned    IF patient discharges home will need the following DME: to be determined (TBD)         SUBJECTIVE:   Patient stated My pleasure, when I thanked pt for working with me    OBJECTIVE DATA SUMMARY:   Chart checked, pt cleared by nursing  HISTORY:    Past Medical History:   Diagnosis Date    Anxiety disorder     [facility]     CAD (coronary artery disease) 2007    stents, CABG x 3v    Carotid stenosis     Cervical stenosis of spinal canal 07/2019    Chronic kidney disease     Cough     CVA (cerebral vascular accident) ([facility] [address] [facility] 07/2019    left lacunar infarct at head of caudate    Depression     AND CHRONIC ANXIETY    Diabetes ([facility] [facility]     GERD (gastroesophageal reflux disease)     High cholesterol     History of peptic ulcer     Bleeding ulcer with increased NSAID use    Hypertension     Left carotid stenosis 07/2019    s/p left CEA with Dr. Tatyana Zhou, old 2007    PUD (peptic ulcer disease)     Stroke ([facility] [address] [facility]     Tremor     Valvular heart disease      Past Surgical History:   Procedure Laterality Date    COLONOSCOPY N/A 12/17/2021    COLONOSCOPY   :- performed by Abilio Sun MD at [address] HX CAROTID ENDARTERECTOMY  07/2019    HX CORONARY ARTERY BYPASS GRAFT      HX TONSILLECTOMY  1963    [place] CARDIAC SURG PROCEDURE UNLIST      CABG X3 VESSEL    [place] TOTAL KNEE ARTHROPLASTY Right 2015       Personal factors and/or comorbidities impacting plan of care: h/o Parkinson's Disease and CVA     Home Situation  Home Environment: Long term care  Care Facility Name: [facility]  # Steps to Enter: 0  One/Two Story Residence: One story  Living Alone: No  Support Systems: Skilled Nursing Facility,Other Family Member(s) (niece lives locally)  Patient Expects to be Discharged to[de-identified] Skilled nursing facility  Current DME Used/Available at Home: Samira Morrison, dillan,Wheelchair  Tub or Shower Type: Shower    EXAMINATION/PRESENTATION/DECISION MAKING:   Critical Behavior:  Neurologic State: Alert  Orientation Level: Oriented X4,Other (Comment) (forgetful)  Cognition: Follows commands  Safety/Judgement: Awareness of environment  Hearing:     Skin:  refer to MD and nursing notes  Edema: none noted  Range Of Motion:  AROM: Generally decreased, functional                       Strength:    Strength: Generally decreased, functional                    Tone & Sensation:                                  Coordination:     Vision:      Functional Mobility:  Bed Mobility:              Transfers:  Sit to Stand: Moderate assistance;Minimum assistance (variable)  Stand to Sit: Minimum assistance; Moderate assistance (vairiable)                       Balance:   Sitting: Intact; With support  Standing: Impaired; With support  Standing - Static: Constant support; Fair  Standing - Dynamic : Constant support;Poor  Ambulation/Gait Training:  Distance (ft): 6 Feet (ft)  Assistive Device: Gait belt;Walker, rolling  Ambulation - Level of Assistance: Moderate assistance        Gait Abnormalities: Decreased step clearance (posterior lean)        Base of Support: Center of gravity altered;Narrowed     Speed/Samira: Slow;Shuffled  Step Length: Left shortened;Right shortened        Interventions: Safety awareness training            Stairs:               Therapeutic Exercises:   Seated slowly 5 reps each alternating: LAQs combined with ankle pumps and marching     Functional Measure:  Tinetti test:    Sitting Balance: 1  Arises: 0  Attempts to Rise: 0  Immediate Standing Balance: 0  Standing Balance: 0  Nudged: 0  Eyes Closed: 0  Turn 360 Degrees - Continuous/Discontinuous: 0  Turn 360 Degrees - Steady/Unsteady: 0  Sitting Down: 0  Balance Score: 1 Balance total score  Indication of Gait: 0  R Step Length/Height: 1  L Step Length/Height: 1  R Foot Clearance: 1  L Foot Clearance: 1  Step Symmetry: 0  Step Continuity: 0  Path: 1  Trunk: 0  Walking Time: 1  Gait Score: 6 Gait total score  Total Score: 7/28 Overall total score         Tinetti Tool Score Risk of Falls  <19 = High Fall Risk  19-24 = Moderate Fall Risk  25-28 = Low Fall Risk  Belloetti ME. Performance-Oriented Assessment of Mobility Problems in Elderly Patients. [facility] 66; [id number]. (Scoring Description: PT Bulletin Feb. 10, 1993)    Older adults: Martinez Mcmahan et al, 2009; n = 1000 [facility] elderly evaluated with ABC, KATJA, ADL, and IADL)  · Mean KATJA score for males aged 69-68 years = 26.21(3.40)  · Mean KATJA score for females age 69-68 years = 25.16(4.30)  · Mean KATJA score for males over 80 years = 23.29(6.02)  · Mean KATJA score for females over 80 years = 17.20(8.32)          Pain Rating:  None rated    Activity Tolerance:   Good and requires rest breaks    After treatment patient left in no apparent distress:   Sitting in chair, Call bell within reach, and Bed / chair alarm activated    COMMUNICATION/EDUCATION:   The patients plan of care was discussed with: Occupational therapist and Registered nurse. Fall prevention education was provided and the patient/caregiver indicated understanding., Patient/family have participated as able in goal setting and plan of care. , and Patient/family agree to work toward stated goals and plan of care.     Thank you for this referral.  Marcos Benavides   Time Calculation: 28 mins

## 2021-12-20 NOTE — PROGRESS NOTES
Problem: Self Care Deficits Care Plan (Adult)  Goal: *Acute Goals and Plan of Care (Insert Text)  Description: Description: FUNCTIONAL STATUS PRIOR TO ADMISSION: The patient was functional at the wheelchair level and was modified independent for transfers to the wheelchair per pt report. Pt denies any falls in the last year. HOME SUPPORT PRIOR TO ADMISSION: The patient lived in [facility] due to her apartment lease ending in November 2021 and is looking for new living arrangements. Pt reports she has a niece in the area that assist her. Occupational Therapy Goals  Reviewed and remain appropriate 12/20/2021  1. Patient will perform bathing with minimal assistance/contact guard assist within 7 day(s). 2.  Patient will perform lower body dressing with moderate assistance  within 7 day(s). 3.  Patient will perform upper body dressing with supervision/set-up within 7 day(s). 4.  Patient will perform toilet transfers with minimal assistance/contact guard assist within 7 day(s). 5.  Patient will perform all aspects of toileting with minimal assistance/contact guard assist within 7 day(s). 6.  Patient will participate in upper extremity therapeutic exercise/activities with minimal assistance/contact guard assist for 15 minutes within 7 day(s). 7.  Patient will utilize energy conservation techniques during functional activities with verbal cues within 7 day(s). Occupational Therapy Goals  Initiated 12/13/2021  1. Patient will perform bathing with minimal assistance/contact guard assist within 7 day(s). 2.  Patient will perform lower body dressing with moderate assistance  within 7 day(s). 3.  Patient will perform upper body dressing with supervision/set-up within 7 day(s). 4.  Patient will perform toilet transfers with minimal assistance/contact guard assist within 7 day(s). 5.  Patient will perform all aspects of toileting with minimal assistance/contact guard assist within 7 day(s).   6. Patient will participate in upper extremity therapeutic exercise/activities with minimal assistance/contact guard assist for 15 minutes within 7 day(s). 7.  Patient will utilize energy conservation techniques during functional activities with verbal cues within 7 day(s). Outcome: Progressing Towards Goal   OCCUPATIONAL THERAPY TREATMENT/WEEKLY RE-ASSESSMENT  Patient: Lev Gallardo (34 y.o. female)  Date: 12/20/2021  Diagnosis: Acute on chronic respiratory failure with hypoxia (HCC) [J96.21]  Elevated troponin [R77.8]  CAP (community acquired pneumonia) [J18.9] <principal problem not specified>  Procedure(s) (LRB):  ESOPHAGOGASTRODUODENOSCOPY (EGD) (N/A)  COLONOSCOPY   :- (N/A)  ENDOSCOPIC POLYPECTOMY (N/A) 3 Days Post-Op  Precautions: Contact,Bed Alarm,Fall  Chart, occupational therapy assessment, plan of care, and goals were reviewed. ASSESSMENT  Patient continues with skilled OT services and is progressing towards goals. Patient semi supine in bed upon OT entry and agreeable to participate with therapy. Patient seen today for weekly reassessment following IMCU transfer on 12/18/2021 following a rapid response for acute hypoxia, patient placed on BIPAP. Patient reports she has not been out of bed over the weekend, agreeable to sitting up in recliner. Patient min A sup -> sit, sit <> stand and bed to chair transfer. Patient requiring max A for scooting at edge of bed to get feet onto the floor. Patient requiring total A to don socks in supine as patient with limited lower extremity access. Once sitting in recliner, patient participating in grooming tasks to wash face and brush teeth with setup assistance. Patient participated in upper extremity therapeutic exercise while seated in recliner, see grid below for details. Patient would benefit from skilled OT services during admission to improve independence with self care and functional mobility/transfers.  Recommend discharge to SNF at this time.    Current Level of Function Impacting Discharge (ADLs): total A lower extremity activities of daily living, setup A upper extremity activities of daily living, min A for mobility and transfer, max A scooting    Other factors to consider for discharge: severity of deficits, prior level of function, history of CVA and Parkinsons         PLAN :  Goals have been updated based on progression since last assessment. Patient continues to benefit from skilled intervention to address the above impairments. Continue to follow patient 4 times a week to address goals. Recommend with staff: up to chair for meals 3x/day, up to [facility] for toileting    Recommendation for discharge: (in order for the patient to meet his/her long term goals)  Therapy up to 5 days/week in SNF setting    This discharge recommendation:  Has been made in collaboration with the attending provider and/or case management    IF patient discharges home will need the following DME: TBD       SUBJECTIVE:   Patient stated I didn't get out of bed over the weekend.     OBJECTIVE DATA SUMMARY:   Cognitive/Behavioral Status:  Neurologic State: Alert  Orientation Level: Oriented X4;Other (Comment) (forgetful)  Cognition: Follows commands             Functional Mobility and Transfers for ADLs:  Bed Mobility:  Supine to Sit: Minimum assistance  Scooting: Maximum assistance    Transfers:  Sit to Stand: Minimum assistance     Bed to Chair: Minimum assistance    Balance:  Sitting: Intact; With support  Standing: Impaired; With support  Standing - Static: Fair;Constant support  Standing - Dynamic : Poor;Constant support    ADL Intervention:       Grooming  Position Performed: Seated in chair  Washing Face: Set-up  Brushing Teeth: Set-up                   Lower Body Dressing Assistance  Socks:  Total assistance (dependent)  Leg Crossed Method Used: Yes  Position Performed: Supine  Cues: Don;Physical assistance            Functional Measure:    Barthel Index:  Bathing: 0  Bladder: 5  Bowels: 5  Groomin  Dressin  Feeding: 10  Mobility: 0  Stairs: 0  Toilet Use: 5  Transfer (Bed to Chair and Back): 10  Total: 45/100      The Barthel ADL Index: Guidelines  1. The index should be used as a record of what a patient does, not as a record of what a patient could do. 2. The main aim is to establish degree of independence from any help, physical or verbal, however minor and for whatever reason. 3. The need for supervision renders the patient not independent. 4. A patient's performance should be established using the best available evidence. Asking the patient, friends/relatives and nurses are the usual sources, but direct observation and common sense are also important. However direct testing is not needed. 5. Usually the patient's performance over the preceding 24-48 hours is important, but occasionally longer periods will be relevant. 6. Middle categories imply that the patient supplies over 50 per cent of the effort. 7. Use of aids to be independent is allowed. Score Interpretation (from [facility])    Independent   60-79 Minimally independent   40-59 Partially dependent   20-39 Very dependent   <20 Totally dependent     -Ramesh Pace., Barthel, DRennyW. (1965). Functional evaluation: the Barthel Index. [facility] ([address] (). The Barthel activities of daily living index: self-reporting versus actual performance in the old (> or = 75 years). Journal of [address] [place] [address] 45(7), [facility], J.LOGANM.F, Des Canseco, Hemal Wilkins. (). Measuring the change in disability after inpatient rehabilitation; comparison of the responsiveness of the Barthel Index and Functional [place] Measure. Journal of Neurology, Neurosurgery, and Psychiatry, 66(4), 758-015.   David Suarez, N.J.A, APARNA Ventura, & Madeline Kirkland M.A. (2004) Assessment of post-stroke quality of life in cost-effectiveness studies: The usefulness of the Barthel Index and the EuroQoL-5D. Quality of Life Research, 13, 628-20       Therapeutic Exercises:   Exercise: Sets: Reps: Active range of motion: Active assist range of motion: Passive range of motion: Self range of motion: Comments:   Shoulder flexion/extension 1 10 [] [x] [] [] Limited shoulder range of motion without assist   Elbow flexion/extension 1 10 [x] [] [] []    Wrist flexion/extension 1 10 [x] [] [] []         Pain:  0/10 at rest, left knee and foot pain in standing    Activity Tolerance:   Fair, desaturates with exertion and requires oxygen and requires rest breaks    After treatment patient left in no apparent distress:   Sitting in chair, Call bell within reach and Bed / chair alarm activated    COMMUNICATION/COLLABORATION:   The patients plan of care was discussed with: Physical therapist and Registered nurse.      CLEVELAND Denis/L  Time Calculation: 26 mins

## 2021-12-21 VITALS
SYSTOLIC BLOOD PRESSURE: 158 MMHG | BODY MASS INDEX: 28.91 KG/M2 | TEMPERATURE: 98.2 F | HEART RATE: 66 BPM | WEIGHT: 173.5 LBS | DIASTOLIC BLOOD PRESSURE: 71 MMHG | HEIGHT: 65 IN | RESPIRATION RATE: 16 BRPM | OXYGEN SATURATION: 97 %

## 2021-12-21 LAB
ANION GAP SERPL CALC-SCNC: 6 MMOL/L (ref 5–15)
BUN SERPL-MCNC: 31 MG/DL (ref 6–20)
BUN/CREAT SERPL: 21 (ref 12–20)
CALCIUM SERPL-MCNC: 8.8 MG/DL (ref 8.5–10.1)
CHLORIDE SERPL-SCNC: 103 MMOL/L (ref 97–108)
CO2 SERPL-SCNC: 27 MMOL/L (ref 21–32)
CREAT SERPL-MCNC: 1.45 MG/DL (ref 0.55–1.02)
ERYTHROCYTE [DISTWIDTH] IN BLOOD BY AUTOMATED COUNT: 14.2 % (ref 11.5–14.5)
GLUCOSE BLD STRIP.AUTO-MCNC: 108 MG/DL (ref 65–117)
GLUCOSE SERPL-MCNC: 110 MG/DL (ref 65–100)
HCT VFR BLD AUTO: 23.9 % (ref 35–47)
HGB BLD-MCNC: 7.7 G/DL (ref 11.5–16)
MCH RBC QN AUTO: 32.4 PG (ref 26–34)
MCHC RBC AUTO-ENTMCNC: 32.2 G/DL (ref 30–36.5)
MCV RBC AUTO: 100.4 FL (ref 80–99)
NRBC # BLD: 0 K/UL (ref 0–0.01)
NRBC BLD-RTO: 0 PER 100 WBC
PLATELET # BLD AUTO: 234 K/UL (ref 150–400)
PMV BLD AUTO: 9 FL (ref 8.9–12.9)
POTASSIUM SERPL-SCNC: 3.7 MMOL/L (ref 3.5–5.1)
RBC # BLD AUTO: 2.38 M/UL (ref 3.8–5.2)
SERVICE CMNT-IMP: NORMAL
SODIUM SERPL-SCNC: 136 MMOL/L (ref 136–145)
WBC # BLD AUTO: 5.2 K/UL (ref 3.6–11)

## 2021-12-21 PROCEDURE — 80048 BASIC METABOLIC PNL TOTAL CA: CPT

## 2021-12-21 PROCEDURE — 77030038269 HC DRN EXT URIN PURWCK BARD -A

## 2021-12-21 PROCEDURE — 74011250637 HC RX REV CODE- 250/637: Performed by: FAMILY MEDICINE

## 2021-12-21 PROCEDURE — 36415 COLL VENOUS BLD VENIPUNCTURE: CPT

## 2021-12-21 PROCEDURE — 85027 COMPLETE CBC AUTOMATED: CPT

## 2021-12-21 PROCEDURE — 74011250637 HC RX REV CODE- 250/637: Performed by: INTERNAL MEDICINE

## 2021-12-21 PROCEDURE — 82962 GLUCOSE BLOOD TEST: CPT

## 2021-12-21 PROCEDURE — 74011250637 HC RX REV CODE- 250/637: Performed by: HOSPITALIST

## 2021-12-21 RX ORDER — ONDANSETRON 4 MG/1
4 TABLET, ORALLY DISINTEGRATING ORAL
Qty: 30 TABLET | Refills: 0 | Status: SHIPPED
Start: 2021-12-21

## 2021-12-21 RX ORDER — POLYETHYLENE GLYCOL 3350 17 G/17G
17 POWDER, FOR SOLUTION ORAL
Qty: 30 EACH | Refills: 0 | Status: SHIPPED
Start: 2021-12-21 | End: 2022-01-20

## 2021-12-21 RX ORDER — FUROSEMIDE 40 MG/1
40 TABLET ORAL DAILY
Qty: 30 TABLET | Refills: 0 | Status: SHIPPED
Start: 2021-12-21 | End: 2022-01-06

## 2021-12-21 RX ADMIN — ARIPIPRAZOLE 20 MG: 5 TABLET ORAL at 08:32

## 2021-12-21 RX ADMIN — BUSPIRONE HYDROCHLORIDE 5 MG: 5 TABLET ORAL at 08:33

## 2021-12-21 RX ADMIN — HYDRALAZINE HYDROCHLORIDE 50 MG: 50 TABLET, FILM COATED ORAL at 08:32

## 2021-12-21 RX ADMIN — CARVEDILOL 3.12 MG: 3.12 TABLET, FILM COATED ORAL at 08:33

## 2021-12-21 RX ADMIN — FUROSEMIDE 40 MG: 40 TABLET ORAL at 08:33

## 2021-12-21 RX ADMIN — CARBIDOPA AND LEVODOPA 2 TABLET: 25; 100 TABLET ORAL at 08:33

## 2021-12-21 RX ADMIN — AMLODIPINE BESYLATE 2.5 MG: 5 TABLET ORAL at 08:31

## 2021-12-21 RX ADMIN — APIXABAN 2.5 MG: 2.5 TABLET, FILM COATED ORAL at 08:33

## 2021-12-21 RX ADMIN — PANTOPRAZOLE SODIUM 40 MG: 40 TABLET, DELAYED RELEASE ORAL at 06:55

## 2021-12-21 RX ADMIN — DULOXETINE 120 MG: 60 CAPSULE, DELAYED RELEASE ORAL at 08:32

## 2021-12-21 NOTE — PROGRESS NOTES
Bedside shift change report given to The Bridger RN (oncoming nurse) by Reginald Hallman (offgoing nurse). Report included the following information SBAR, Kardex, Procedure Summary, Intake/Output, MAR and Recent Results.

## 2021-12-21 NOTE — PROGRESS NOTES
Bedside shift change report given to Jem Patino RN (oncoming nurse) by Berry Peraza RN (offgoing nurse). Report included the following information SBAR, Kardex, ED Summary, Intake/Output, MAR, Accordion, Recent Results, Med Rec Status, Cardiac Rhythm NSR and Alarm Parameters .

## 2021-12-21 NOTE — PROGRESS NOTES
TRANSFER - OUT REPORT:    Verbal report given to Elissa Mac (name) on Merlinda Moh  being transferred to [facility] (unit) for routine progression of care       Report consisted of patients Situation, Background, Assessment and   Recommendations(SBAR). Information from the following report(s) SBAR, Kardex, ED Summary, Procedure Summary, Intake/Output and MAR was reviewed with the receiving nurse. Lines:   Peripheral IV 12/11/21 Left; Upper (Active)   Site Assessment Clean, dry, & intact 12/21/21 0305   Phlebitis Assessment 0 12/21/21 0305   Infiltration Assessment 0 12/21/21 0305   Dressing Status Clean, dry, & intact 12/21/21 0305   Dressing Type Transparent;Tape 12/21/21 0305   Hub Color/Line Status Capped 12/21/21 0305   Action Taken Open ports on tubing capped 12/21/21 0305   Alcohol Cap Used Yes 12/21/21 0305        Opportunity for questions and clarification was provided.       Patient transported with:   O2 @ 2 liters

## 2021-12-21 NOTE — ROUTINE PROCESS
Primary Nurse Meng Neil RN and Tomás Steve RN performed a dual skin assessment on this patient Impairment noted- see wound doc flow sheet  Gregorio score is 18    Small skin tear on left mid back.

## 2021-12-21 NOTE — PROGRESS NOTES
TRANSFER - OUT REPORT:    Verbal report given to Chava Maciel RN (name) on Merlinda Moh  being transferred to [facility] (unit) for routine progression of care       Report consisted of patients Situation, Background, Assessment and   Recommendations(SBAR). Information from the following report(s) SBAR, Kardex, Procedure Summary, Intake/Output, Accordion and Recent Results was reviewed with the receiving nurse. Lines:   Peripheral IV 12/11/21 Left; Upper (Active)   Site Assessment Clean, dry, & intact 12/20/21 0851   Phlebitis Assessment 0 12/20/21 0851   Infiltration Assessment 0 12/20/21 0851   Dressing Status Clean, dry, & intact 12/20/21 0851   Dressing Type Transparent 12/20/21 0851   Hub Color/Line Status Blue;Flushed;End cap changed 12/20/21 0688   Action Taken Open ports on tubing capped 12/19/21 2122   Alcohol Cap Used Yes 12/20/21 0851        Opportunity for questions and clarification was provided.       Patient transported with:   Monitor  O2 @ 2 liters  Registered Nurse

## 2021-12-21 NOTE — DISCHARGE SUMMARY
Discharge Summary       PATIENT ID: Joseph Turner  MRN: [id number]   YOB: 1940    DATE OF ADMISSION: 12/10/2021  3:28 AM    DATE OF DISCHARGE: 12/21/2021   PRIMARY CARE PROVIDER: Niraj Melendrez DO     ATTENDING PHYSICIAN: Daja Gomez MD   DISCHARGING PROVIDER: Darien Nicole MD    To contact this individual call [phone] and ask the  to page. If unavailable ask to be transferred the Adult Hospitalist Department. CONSULTATIONS: IP CONSULT TO GASTROENTEROLOGY    PROCEDURES/SURGERIES: Procedure(s):  ESOPHAGOGASTRODUODENOSCOPY (EGD)  COLONOSCOPY   :-  ENDOSCOPIC POLYPECTOMY    [address] COURSE:      A 81 y.o. 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   Per record, she was picked up from [facility] where she was found to be hypoxic to the fifties on her normal 2 L nasal cannula.    She was recently admitted from 10/29 to 11/3 for sepsis 2/2 proteus UTI.   In the ED, SBP was elevated to one nineties.  Per record, SPO2 is 100% on BiPAP.  Labs were significant for WBC 15.7, creatinine 1.94, CO2 20, troponin 2,017, and probnp 18,236.  Rapid covid is negative.     Acute hypoxic respiratory failure due to acute pulmonary edema and possible pneumonia  -improved, SpO2 100% on 2 l/m, monitor pulse ox  -completed abx, continue on lasix 40 mg daily, prn duo neb  -leukocytosis improved and afebrile,   -repeated chest x ray on 12/18 pulmonary edema with bibasilar consolidation without significant change.   Acute on chronic diastolic CHF NYHA Class IV  -improving  -continue on lasix, coreg and hydralazine,   -not on ACEi/ARB due to renal insufficiency   -monitor I/O and daily weight  CKD stage III  -creatinine stable  -avoid nephrotoxin   -monitor renal function    A Fib, normal sinus rhythm now  -on coreg and eliquis  -continue cardiac monitoring  Hx of CAD s/p CABG  -no left side chest pain  -on eliquis, lipitor, coreg and lipitor  T2DM  -A1c 6.3  -continue on sliding scale, monitor finger stick glucose    HTN  -BP normal, on hydralazine, coreg, lasix, monitor BP  Hx of CVA  -stable  -continue eliquis and lipitor  Anemia of chronic disease   -stable, Hgb 7.7  -monitor H/H   Hx of GERD  -stable, on protonix  Hx of anxiety/depression  -stable  -on cymbalta, aripprazole and buspar  Hx of parkinson's disease   -on sinemet  -PT/OT  Dementia   -conscious and alert, oriented to place and person  -continue neuro check and supportive care           Code status: DNR  DVT prophylaxis: Eliquis    DISCHARGE DIAGNOSES / PLAN:      Acute hypoxic respiratory failure due to acute pulmonary edema and possible pneumonia  -improved, SpO2 100% on 2 l/m, monitor pulse ox  -continue prn duo neb, monitor pulse ox  Acute on chronic diastolic CHF NYHA Class IV  -improving  -continue on lasix, coreg and hydralazine,   -not on ACEi/ARB due to renal insufficiency   -monitor I/O and daily weight  -outpatient follow up with Cardiology  CKD stage III  -creatinine stable  -avoid nephrotoxin   -monitor renal function    A Fib, normal sinus rhythm now  -continue on coreg and eliquis  Hx of CAD s/p CABG  -no left side chest pain  -continue on eliquis, lipitor, coreg and lipitor  T2DM  -continue on sliding scale, monitor finger stick glucose    HTN  -BP normal, continue on hydralazine, coreg, lasix and monitor BP  Hx of CVA  -continue eliquis and lipitor  Anemia of chronic disease   -stable   -monitor H/H   Hx of GERD  -stable, continue on protonix  Hx of anxiety/depression  -stable  -continue on cymbalta, aripprazole and buspar  Hx of parkinson's disease   -stable, continue on sinemet  -continue PT/OT  Dementia   -conscious and alert, oriented to place and person  -continue neuro check and supportive care       PENDING TEST RESULTS:   At the time of discharge the following test results are still pending: None    FOLLOW UP APPOINTMENTS:    Follow-up Information     Follow up With Specialties Details Why Contact Info   1. [address] [facility] [address]  [phone]   2. Giovanni Ledezma, DO Family Medicine In one week  [address]  [phone]        3. Jane Desai MD, Cardiology in 1-2 weeks        DIET: Diabetic Diet     ACTIVITY: Activity as tolerated    WOUND CARE: None    EQUIPMENT needed: None      DISCHARGE MEDICATIONS:  Current Discharge Medication List      START taking these medications    Details   polyethylene glycol (MIRALAX) 17 gram packet Take 1 Packet by mouth daily as needed for Constipation for up to 30 days. Qty: 30 Each, Refills: 0  Start date: 12/21/2021, End date: 1/20/2022      ondansetron (ZOFRAN ODT) 4 mg disintegrating tablet Take 1 Tablet by mouth every eight (8) hours as needed for Nausea or Vomiting. Qty: 30 Tablet, Refills: 0  Start date: 12/21/2021      furosemide (LASIX) 40 mg tablet Take 1 Tablet by mouth daily. Qty: 30 Tablet, Refills: 0  Start date: 12/21/2021         CONTINUE these medications which have NOT CHANGED    Details   apixaban (ELIQUIS) 2.5 mg tablet Take 1 Tablet by mouth two (2) times a day. Qty: 60 Tablet, Refills: 11      amLODIPine (NORVASC) 2.5 mg tablet Take 1 Tablet by mouth daily. Qty: 30 Tablet, Refills: 5      atorvastatin (LIPITOR) 40 mg tablet Take 1 Tablet by mouth nightly. Qty: 30 Tablet, Refills: 5      carvediloL (COREG) 3.125 mg tablet Take 1 Tablet by mouth two (2) times daily (with meals). Qty: 60 Tablet, Refills: 5      busPIRone (BUSPAR) 5 mg tablet Take 1 Tab by mouth three (3) times daily.   Qty: 90 Tab, Refills: 0      gabapentin (NEURONTIN) 100 mg capsule Take 100 mg by mouth nightly. melatonin 3 mg tablet Take 6 mg by mouth nightly. nitroglycerin (Nitrostat) 0.4 mg SL tablet 0.4 mg by SubLINGual route every five (5) minutes as needed for Chest Pain. Up to 3 doses. carbidopa-levodopa (SINEMET)  mg per tablet Take 2 Tabs by mouth four (4) times daily. Qty: 720 Tab, Refills: 1      cyanocobalamin (VITAMIN B12) 100 mcg tablet Take 100 mcg by mouth daily. Cholecalciferol, Vitamin D3, 1,000 unit cap Take 1,000 Units by mouth daily. pantoprazole (PROTONIX) 40 mg tablet Take 40 mg by mouth Daily (before breakfast). Indications: h/o bleeding ulcer with nsaid      DULoxetine (CYMBALTA) 60 mg capsule Take 120 mg by mouth daily. Indications: anxiousness associated with depression      multivitamins-minerals-lutein (CENTRUM SILVER) tab tablet Take 1 Tablet by mouth daily. ARIPiprazole (ABILIFY) 20 mg tablet Take 20 mg by mouth daily. cilostazoL (PLETAL) 100 mg tablet Take  by mouth Before breakfast and dinner. hydrALAZINE (APRESOLINE) 50 mg tablet Take 1 Tablet by mouth three (3) times daily. Qty: 90 Tablet, Refills: 5      potassium chloride SR (KLOR-CON 10) 10 mEq tablet Take 20 mEq by mouth daily. clopidogreL (Plavix) 75 mg tab Take 75 mg by mouth daily (after breakfast). acetaminophen (TYLENOL) 325 mg tablet Take 650 mg by mouth every six (6) hours as needed for Pain or Fever. vit C,X-Ml-iires-lutein-zeaxan (PRESERVISION AREDS-2) 950-367-42-1 mg-unit-mg-mg cap capsule Take 1 Cap by mouth two (2) times a day. senna-docusate (SENNA WITH DOCUSATE SODIUM) 8.6-50 mg per tablet Take 1 Tab by mouth nightly. NOTIFY YOUR PHYSICIAN FOR ANY OF THE FOLLOWING:   Fever over 101 degrees for 24 hours. Chest pain, shortness of breath, fever, chills, nausea, vomiting, diarrhea, change in mentation, falling, weakness, bleeding. Severe pain or pain not relieved by medications.   Or, any other signs or symptoms that you may have questions about. DISPOSITION:    Home With:   OT  PT  HH  RN      x Long term SNF/Inpatient Rehab    Independent/assisted living    Hospice    Other:       PATIENT CONDITION AT DISCHARGE:     Functional status    Poor    x Deconditioned     Independent      Cognition     Lucid     Forgetful    x Dementia      Catheters/lines (plus indication)    Sue     PICC     PEG    x None      Code status     Full code    x DNR      PHYSICAL EXAMINATION AT DISCHARGE:  General:          Alert, cooperative, no distress, appears stated age. HEENT:           Atraumatic, anicteric sclerae, pink conjunctivae                          No oral ulcers, mucosa moist, throat clear, dentition fair  Neck:               Supple, symmetrical  Lungs:             Clear to auscultation bilaterally. No Wheezing or Rhonchi. No rales. Chest wall:      No tenderness  No Accessory muscle use. Heart:              Regular  rhythm,  No  murmur   No edema  Abdomen:        Soft, non-tender. Not distended. Bowel sounds normal  Extremities:     No cyanosis. No clubbing,                            Skin turgor normal, Capillary refill normal  Skin:                Not pale. Not Jaundiced  No rashes   Psych:             Not anxious or agitated.   Neurologic:      Alert, moves all extremities, answers questions appropriately and responds to commands       Recent Results (from the past 24 hour(s))   GLUCOSE, POC    Collection Time: 12/20/21  9:54 PM   Result Value Ref Range    Glucose (POC) 148 (H) 65 - 117 mg/dL    Performed by Mabel [address], BASIC    Collection Time: 12/21/21  2:30 AM   Result Value Ref Range    Sodium 136 136 - 145 mmol/L    Potassium 3.7 3.5 - 5.1 mmol/L    Chloride 103 97 - 108 mmol/L    CO2 27 21 - 32 mmol/L    Anion gap 6 5 - 15 mmol/L    Glucose 110 (H) 65 - 100 mg/dL    BUN 31 (H) 6 - 20 MG/DL    Creatinine 1.45 (H) 0.55 - 1.02 MG/DL    BUN/Creatinine ratio 21 (H) 12 - 20 GFR est AA 42 (L) >60 ml/min/1.73m2    GFR est non-AA 35 (L) >60 ml/min/1.73m2    Calcium 8.8 8.5 - 10.1 MG/DL   CBC W/O DIFF    Collection Time: 12/21/21  2:30 AM   Result Value Ref Range    WBC 5.2 3.6 - 11.0 K/uL    RBC 2.38 (L) 3.80 - 5.20 M/uL    HGB 7.7 (L) 11.5 - 16.0 g/dL    HCT 23.9 (L) 35.0 - 47.0 %    .4 (H) 80.0 - 99.0 FL    MCH 32.4 26.0 - 34.0 PG    MCHC 32.2 30.0 - 36.5 g/dL    RDW 14.2 11.5 - 14.5 %    PLATELET 674 099 - 566 K/uL    MPV 9.0 8.9 - 12.9 FL    NRBC 0.0 0  WBC    ABSOLUTE NRBC 0.00 0.00 - 0.01 K/uL   GLUCOSE, POC    Collection Time: 12/21/21  6:15 AM   Result Value Ref Range    Glucose (POC) 108 65 - 117 mg/dL    Performed by [address] [place] [address]:  Problem List as of 12/21/2021 Date Reviewed: 8/26/2021          Codes Class Noted - Resolved    CAP (community acquired pneumonia) ICD-10-CM: J18.9  ICD-9-CM: 486  12/10/2021 - Present        Acute on chronic respiratory failure with hypoxia ([facility] ICD-10-CM: J96.21  ICD-9-CM: 518.84, 799.02  12/10/2021 - Present        Cellulitis and abscess of lower extremity ICD-10-CM: L03.119, L02.419  ICD-9-CM: 682.6  10/29/2021 - Present        SOB (shortness of breath) ICD-10-CM: R06.02  ICD-9-CM: 786.05  4/23/2021 - Present        UTI (urinary tract infection) ICD-10-CM: N39.0  ICD-9-CM: 599.0  4/9/2021 - Present        CHF exacerbation ([facility] ICD-10-CM: I50.9  ICD-9-CM: 428.0  9/25/2020 - Present        Weakness ICD-10-CM: R53.1  ICD-9-CM: 780.79  5/4/2020 - Present        Generalized weakness ICD-10-CM: R53.1  ICD-9-CM: 780.79  4/30/2020 - Present        Carotid artery stenosis, symptomatic, left ICD-10-CM: P81.01  ICD-9-CM: 433.10  7/26/2019 - Present        HTN (hypertension) ICD-10-CM: I10  ICD-9-CM: 401.9  7/17/2019 - Present        Acute ischemic stroke ([facility] ICD-10-CM: I63.9  ICD-9-CM: 434.91  7/12/2019 - Present        Fall ICD-10-CM: W19. Josue   ICD-9-CM: Z393.9  12/24/2018 - Present        CKD (chronic kidney disease), stage III ([facility] ICD-10-CM: N18.30  ICD-9-CM: 585.3  7/8/2015 - Present        Edema ICD-10-CM: R60.9  ICD-9-CM: 782.3  5/6/2015 - Present        Diabetes mellitus ([facility] ICD-10-CM: E11.9  ICD-9-CM: 250.00  5/6/2015 - Present        Postoperative anemia due to acute blood loss ICD-10-CM: D62  ICD-9-CM: 285.1  2/14/2015 - Present        S/P CABG (coronary artery bypass graft) ICD-10-CM: Z95.1  ICD-9-CM: V45.81  2/13/2015 - Present        Syncope ICD-10-CM: R55  ICD-9-CM: 780.2  2/9/2015 - Present        Bradycardia ICD-10-CM: R00.1  ICD-9-CM: 427.89  2/9/2015 - Present        Acute kidney injury ([facility] ICD-10-CM: N17.9  ICD-9-CM: 584.9  2/9/2015 - Present        Anemia ICD-10-CM: D64.9  ICD-9-CM: 285.9  9/9/2014 - Present        GI bleed ICD-10-CM: K92.2  ICD-9-CM: 578.9  9/9/2014 - Present        AF (atrial fibrillation) (HCC) ICD-10-CM: I48.91  ICD-9-CM: 427.31  6/20/2014 - Present        Hypotension ICD-10-CM: I95.9  ICD-9-CM: 458.9  6/3/2014 - Present        Coronary artery disease ICD-10-CM: I25.10  ICD-9-CM: 414.00  6/3/2014 - Present    Overview Signed 2/10/2015 11:06 AM by Reinaldo Nelson     2007 PCI x2 to LAD with STEPHAN             S/P coronary artery stent placement ICD-10-CM: Z95.5  ICD-9-CM: V45.82  6/3/2014 - Present        Renal failure ICD-10-CM: N19  ICD-9-CM: 895  6/3/2014 - Present        Elevated troponin ICD-10-CM: R77.8  ICD-9-CM: 790.6  6/3/2014 - Present        Pericardial effusion ICD-10-CM: I31.3  ICD-9-CM: 423.9  6/3/2014 - Present        Lactic acidosis ICD-10-CM: E87.2  ICD-9-CM: 276.2  6/3/2014 - Present        RESOLVED: CHF (congestive heart failure) ([facility] ICD-10-CM: I50.9  ICD-9-CM: 428.0  9/22/2020 - 8/3/2021              Greater than 45 minutes were spent with the patient on counseling and coordination of care    Signed:   Varun Ardon MD  12/21/2021  9:44 AM

## 2021-12-21 NOTE — DISCHARGE INSTRUCTIONS
Discharge SNF/Rehab Instructions/LTAC       PATIENT ID: Joie De  MRN: [id number]   YOB: 1940    DATE OF ADMISSION: 12/10/2021  3:28 AM    DATE OF DISCHARGE: 12/21/2021    PRIMARY CARE PROVIDER: Quinn Ceballos DO       ATTENDING PHYSICIAN: Tyson Fowler MD  DISCHARGING PROVIDER: Godfrey Arevalo MD     To contact this individual call [phone] and ask the  to page. If unavailable ask to be transferred the Adult Hospitalist Department. CONSULTATIONS: IP CONSULT TO GASTROENTEROLOGY    PROCEDURES/SURGERIES: Procedure(s):  ESOPHAGOGASTRODUODENOSCOPY (EGD)  COLONOSCOPY   :-  ENDOSCOPIC POLYPECTOMY    [address] COURSE:     A 81 y.o. 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   [facility] found to be hypoxic to the fifties on her normal 2 L nasal cannula.    She was recently admitted from 10/29 to 11/3 for sepsis 2/2 proteus UTI.   In the ED, SBP was elevated to one nineties.  Per record, SPO2 is 100% on BiPAP.  Labs were significant for WBC 15.7, creatinine 1.94, CO2 20, troponin 2,017, and probnp 18,236.  Rapid covid is negative.     Acute hypoxic respiratory failure due to acute pulmonary edema and possible pneumonia  -improved, SpO2 100% on 2 l/m, monitor pulse ox  -completed abx, continue on lasix 40 mg daily, prn duo neb  -leukocytosis improved and afebrile,   -repeated chest x ray on 12/18 pulmonary edema with bibasilar consolidation without significant change.   Acute on chronic diastolic CHF NYHA Class IV  -improving  -continue on lasix, coreg and hydralazine,   -not on ACEi/ARB due to renal insufficiency   -monitor I/O and daily weight  CKD stage III  -creatinine stable  -avoid nephrotoxin   -monitor renal function    A Fib, normal sinus rhythm now  -on coreg and eliquis  -continue cardiac monitoring  Hx of CAD s/p CABG  -no left side chest pain  -on eliquis, lipitor, coreg and lipitor  T2DM  -A1c 6.3  -continue on sliding scale, monitor finger stick glucose    HTN  -BP normal, on hydralazine, coreg, lasix, monitor BP  Hx of CVA  -stable  -continue eliquis and lipitor  Anemia of chronic disease   -stable, Hgb 7.7  -monitor H/H   Hx of GERD  -stable, on protonix  Hx of anxiety/depression  -stable  -on cymbalta, aripprazole and buspar  Hx of parkinson's disease   -on sinemet  -PT/OT  Dementia   -conscious and alert, oriented to place and person  -continue neuro check and supportive care           Code status: DNR  DVT prophylaxis: Eliquis    DISCHARGE DIAGNOSES / PLAN:      Acute hypoxic respiratory failure due to acute pulmonary edema and possible pneumonia  -improved, SpO2 100% on 2 l/m, monitor pulse ox  -continue prn duo neb, monitor pulse ox  Acute on chronic diastolic CHF NYHA Class IV  -improving  -continue on lasix, coreg and hydralazine,   -not on ACEi/ARB due to renal insufficiency   -monitor I/O and daily weight  -outpatient follow up with Cardiology  CKD stage III  -creatinine stable  -avoid nephrotoxin   -monitor renal function    A Fib, normal sinus rhythm now  -continue on coreg and eliquis  Hx of CAD s/p CABG  -no left side chest pain  -continue on eliquis, lipitor, coreg and lipitor  T2DM  -continue on sliding scale, monitor finger stick glucose    HTN  -BP normal, continue on hydralazine, coreg, lasix and monitor BP  Hx of CVA  -continue eliquis and lipitor  Anemia of chronic disease   -stable   -monitor H/H   Hx of GERD  -stable, continue on protonix  Hx of anxiety/depression  -stable  -continue on cymbalta, aripprazole and buspar  Hx of parkinson's disease   -stable, continue on sinemet  -continue PT/OT  Dementia -conscious and alert, oriented to place and person  -continue neuro check and supportive care       PENDING TEST RESULTS:   At the time of discharge the following test results are still pending: None    FOLLOW UP APPOINTMENTS:    Follow-up Information     Follow up With Specialties Details Why Contact Info   1. [address] [address]  [phone]   2. Jeannine Wild, DO Family Medicine In one week  [address]  [phone]        3. Sudarshan Turner MD, Cardiology in 1-2 weeks     ADDITIONAL CARE RECOMMENDATIONS: ***    DIET: Diabetic Diet       TUBE FEEDING INSTRUCTIONS: None    OXYGEN / BiPAP SETTINGS: Nasal canula 2 l/m    ACTIVITY: Activity as tolerated    WOUND CARE: None    EQUIPMENT needed: None      DISCHARGE MEDICATIONS:   See Medication Reconciliation Form      NOTIFY YOUR PHYSICIAN FOR ANY OF THE FOLLOWING:   Fever over 101 degrees for 24 hours. Chest pain, shortness of breath, fever, chills, nausea, vomiting, diarrhea, change in mentation, falling, weakness, bleeding. Severe pain or pain not relieved by medications. Or, any other signs or symptoms that you may have questions about.     DISPOSITION:    Home With:   OT  PT  HH  RN      x SNF/Inpatient Rehab/LTAC    Independent/assisted living    Hospice    Other:       PATIENT CONDITION AT DISCHARGE:     Functional status    Poor    x Deconditioned     Independent      Cognition     Lucid     Forgetful    x Dementia      Catheters/lines (plus indication)    Sue     PICC     PEG    x None      Code status     Full code    x DNR      PHYSICAL EXAMINATION AT DISCHARGE:   Refer to Progress Note       CHRONIC MEDICAL DIAGNOSES:  Problem List as of 12/21/2021 Date Reviewed: 8/26/2021          Codes Class Noted - Resolved    CAP (community acquired pneumonia) ICD-10-CM: J18.9  ICD-9-CM: 486  12/10/2021 - Present        Acute on chronic respiratory failure with hypoxia [facility]) ICD-10-CM: J96.21  ICD-9-CM: 518.84, 799.02  12/10/2021 - Present        Cellulitis and abscess of lower extremity ICD-10-CM: L03.119, L02.419  ICD-9-CM: 682.6  10/29/2021 - Present        SOB (shortness of breath) ICD-10-CM: R06.02  ICD-9-CM: 786.05  4/23/2021 - Present        UTI (urinary tract infection) ICD-10-CM: N39.0  ICD-9-CM: 599.0  4/9/2021 - Present        CHF exacerbation (HCC) ICD-10-CM: I50.9  ICD-9-CM: 428.0  9/25/2020 - Present        Weakness ICD-10-CM: R53.1  ICD-9-CM: 780.79  5/4/2020 - Present        Generalized weakness ICD-10-CM: R53.1  ICD-9-CM: 780.79  4/30/2020 - Present        Carotid artery stenosis, symptomatic, left ICD-10-CM: O87.61  ICD-9-CM: 433.10  7/26/2019 - Present        HTN (hypertension) ICD-10-CM: I10  ICD-9-CM: 401.9  7/17/2019 - Present        Acute ischemic stroke ([facility] ICD-10-CM: I63.9  ICD-9-CM: 434.91  7/12/2019 - Present        Fall ICD-10-CM: [address] Ali Smoker  ICD-9-CM: E888.9  12/24/2018 - Present        CKD (chronic kidney disease), stage III ([facility] ICD-10-CM: N18.30  ICD-9-CM: 585.3  7/8/2015 - Present        Edema ICD-10-CM: R60.9  ICD-9-CM: 782.3  5/6/2015 - Present        Diabetes mellitus ([facility] ICD-10-CM: E11.9  ICD-9-CM: 250.00  5/6/2015 - Present        Postoperative anemia due to acute blood loss ICD-10-CM: D62  ICD-9-CM: 285.1  2/14/2015 - Present        S/P CABG (coronary artery bypass graft) ICD-10-CM: Z95.1  ICD-9-CM: V45.81  2/13/2015 - Present        Syncope ICD-10-CM: R55  ICD-9-CM: 780.2  2/9/2015 - Present        Bradycardia ICD-10-CM: R00.1  ICD-9-CM: 427.89  2/9/2015 - Present        Acute kidney injury ([facility] ICD-10-CM: N17.9  ICD-9-CM: 584.9  2/9/2015 - Present        Anemia ICD-10-CM: D64.9  ICD-9-CM: 285.9  9/9/2014 - Present        GI bleed ICD-10-CM: K92.2  ICD-9-CM: 578.9  9/9/2014 - Present        AF (atrial fibrillation) ([facility]) ICD-10-CM: I48.91  ICD-9-CM: 427.31  6/20/2014 - Present        Hypotension ICD-10-CM: I95.9  ICD-9-CM: 458.9  6/3/2014 - Present        Coronary artery disease ICD-10-CM: I25.10  ICD-9-CM: 414.00  6/3/2014 - Present    Overview Signed 2/10/2015 11:06 AM by Reinaldo Nelson     2007 PCI x2 to LAD with STEPHAN             S/P coronary artery stent placement ICD-10-CM: Z95.5  ICD-9-CM: V45.82  6/3/2014 - Present        Renal failure ICD-10-CM: N19  ICD-9-CM: 586  6/3/2014 - Present        Elevated troponin ICD-10-CM: R77.8  ICD-9-CM: 790.6  6/3/2014 - Present        Pericardial effusion ICD-10-CM: I31.3  ICD-9-CM: 423.9  6/3/2014 - Present        Lactic acidosis ICD-10-CM: E87.2  ICD-9-CM: 276.2  6/3/2014 - Present        RESOLVED: CHF (congestive heart failure) ([facility] ICD-10-CM: I50.9  ICD-9-CM: 428.0  9/22/2020 - 8/3/2021                CDMP Checked:   Yes x     PROBLEM LIST Updated:  Yes x         Signed:   Varun Ardon MD  12/21/2021  8:14 AM

## 2021-12-21 NOTE — PROGRESS NOTES
[address] [facility] Adult  Hospitalist Group                                                                                          Hospitalist Progress Note  Fallon Montiel MD  Answering service: [phone] from in house phone        Date of Service:  2021  NAME:  Yulissa Franks  :  1940  MRN:  [id number]      Admission Summary:     A 81 y.o. 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   Per record, she was picked up from [facility] where she was found to be hypoxic to the fifties on her normal 2 L nasal cannula.    She was recently admitted from 10/29 to 11/3 for sepsis 2/2 proteus UTI.   In the ED, SBP was elevated to one nineties.  Per record, SPO2 is 100% on BiPAP.  Labs were significant for WBC 15.7, creatinine 1.94, CO2 20, troponin 2,017, and probnp 18,236.  Rapid covid is negative.     Interval history / Subjective:     She said she feels better, no left side chest pain, nausea or vomiting     Assessment & Plan:     Acute hypoxic respiratory failure due to acute pulmonary edema and possible pneumonia  -improving, SpO2 100% on 2 l/m, monitor pulse ox  -completed abx, on lasix 40 mg daily, prn duo neb  -leukocytosis improved and afebrile,   -repeated chest x ray on  pulmonary edema with bibasilar consolidation without significant change.     Acute on chronic diastolic CHF NYHA Class IV  -improving  -on lasix, coreg and hydralazine,   -not on ACEi/ARB due to renal insufficiency   -monitor I/O and daily weight    CKD stage III  -creatinine stable  -avoid nephrotoxin   -monitor renal function     A Fib, normal sinus rhythm now  -continue on coreg and eliquis  -continue cardiac monitoring    Hx of CAD s/p CABG  -no left side chest pain  -on eliquis, lipitor, coreg and lipitor    T2DM  -A1c 6.3  -on sliding scale, monitor finger stick glucose     HTN  -BP normal, on hydralazine, coreg, lasix, monitor BP    Hx of CVA  -stable  -continue eliquis and lipitor    Anemia of chronic disease   -stable, Hgb 7.7  -monitor H/H    Hx of GERD  -stable, on protonix    Hx of anxiety/depression  -stable  -on cymbalta, aripprazole and buspar    Hx of parkinson's disease   -on sinemet  -PT/OT    Dementia   -conscious and alert, oriented to place and person  -continue neuro check and supportive care        Code status: DNR  DVT prophylaxis: [address] discussed with: Patient/Family, Nurse and   Anticipated Disposition: Gianna Natarajan  Anticipated Discharge: 24 hours to 50 hours     Hospital Problems  Date Reviewed: 8/26/2021          Codes Class Noted POA    CAP (community acquired pneumonia) ICD-10-CM: J18.9  ICD-9-CM: 370  12/10/2021 Unknown        Acute on chronic respiratory failure with hypoxia ([facility] [address] ICD-10-CM: J96.21  ICD-9-CM: 518.84, 799.02  12/10/2021 Unknown        Elevated troponin ICD-10-CM: R77.8  ICD-9-CM: 790.6  6/3/2014 Unknown                 Vital Signs:    Last 24hrs VS reviewed since prior progress note. Most recent are:  Visit Vitals  BP (!) 103/45 (BP 1 Location: Right upper arm, BP Patient Position: At rest)   Pulse (!) 57   Temp 98 °F (36.7 °C)   Resp 16   Ht 5' 5\" (1.651 m)   Wt 78.7 kg (173 lb 8 oz)   SpO2 97%   BMI 28.87 kg/m²         Intake/Output Summary (Last 24 hours) at 12/21/2021 2196  Last data filed at 12/21/2021 0617  Gross per 24 hour   Intake    Output 1900 ml   Net -1900 ml        Physical Examination:     I had a face to face encounter with this patient and independently examined them on 12/21/2021 as outlined below:          Constitutional:  No acute distress, cooperative, pleasant    ENT:  Oral mucosa moist, oropharynx benign.     Resp:  Decreased bronchial breath sound bilaterally. No wheezing/rhonchi/rales. No accessory muscle use   CV:  Regular rhythm, normal rate, no murmurs, gallops, rubs    GI:  Soft, non distended, non tender. normoactive bowel sounds, no hepatosplenomegaly     Musculoskeletal:  No edema,     Neurologic:  Conscious and alert, oriented to place and person, moves all extremities, CN II-XII reviewed            Data Review:    Review and/or order of clinical lab test  Review and/or order of tests in the radiology section of CPT  Review and/or order of tests in the medicine section of CPT      Labs:     Recent Labs     12/21/21  0230   WBC 5.2   HGB 7.7*   HCT 23.9*        Recent Labs     12/21/21  0230 12/20/21  0323 12/19/21  0317    137 138   K 3.7 3.8 3.9    104 104   CO2 27 27 28   BUN 31* 30* 27*   CREA 1.45* 1.55* 1.54*   * 114* 109*   CA 8.8 9.1 8.4*   MG  --   --  1.8     No results for input(s): ALT, AP, TBIL, TBILI, TP, ALB, GLOB, GGT, AML, LPSE in the last 72 hours. No lab exists for component: SGOT, GPT, AMYP, HLPSE  No results for input(s): INR, PTP, APTT, INREXT, INREXT in the last 72 hours. No results for input(s): FE, TIBC, PSAT, FERR in the last 72 hours. Lab Results   Component Value Date/Time    Folate 7.8 12/14/2021 04:40 AM      No results for input(s): PH, PCO2, PO2 in the last 72 hours. No results for input(s): CPK, CKNDX, TROIQ in the last 72 hours.     No lab exists for component: CPKMB  Lab Results   Component Value Date/Time    Cholesterol, total 148 09/23/2020 04:27 AM    HDL Cholesterol 52 09/23/2020 04:27 AM    LDL, calculated 83.8 09/23/2020 04:27 AM    Triglyceride 61 09/23/2020 04:27 AM    CHOL/HDL Ratio 2.8 09/23/2020 04:27 AM     Lab Results   Component Value Date/Time    Glucose (POC) 108 12/21/2021 06:15 AM    Glucose (POC) 148 (H) 12/20/2021 09:54 PM    Glucose (POC) 156 (H) 12/18/2021 11:53 AM    Glucose (POC) 96 12/18/2021 10:18 AM    Glucose (POC) 120 (H) 04/24/2021 04:54 PM     Lab Results   Component Value Date/Time    Color YELLOW/STRAW 10/29/2021 07:16 PM    Appearance CLOUDY (A) 10/29/2021 07:16 PM    Specific gravity 1.009 10/29/2021 07:16 PM    pH (UA) 5.0 10/29/2021 07:16 PM    Protein 100 (A) 10/29/2021 07:16 PM    Glucose Negative 10/29/2021 07:16 PM    Ketone Negative 10/29/2021 07:16 PM    Bilirubin Negative 10/29/2021 07:16 PM    Urobilinogen 0.2 10/29/2021 07:16 PM    Nitrites Negative 10/29/2021 07:16 PM    Leukocyte Esterase MODERATE (A) 10/29/2021 07:16 PM    Epithelial cells MODERATE (A) 10/29/2021 07:16 PM    Bacteria 2+ (A) 10/29/2021 07:16 PM    WBC 20-50 10/29/2021 07:16 PM    RBC 0-5 10/29/2021 07:16 PM         Medications Reviewed:     Current Facility-Administered Medications   Medication Dose Route Frequency    insulin lispro (HUMALOG) injection   SubCUTAneous AC&HS    glucose chewable tablet 16 g  4 Tablet Oral PRN    dextrose (D50W) injection syrg 12.5-25 g  12.5-25 g IntraVENous PRN    glucagon (GLUCAGEN) injection 1 mg  1 mg IntraMUSCular PRN    hydrALAZINE (APRESOLINE) 20 mg/mL injection 10 mg  10 mg IntraVENous Q6H PRN    furosemide (LASIX) tablet 40 mg  40 mg Oral DAILY    sodium chloride (NS) flush 5-40 mL  5-40 mL IntraVENous Q8H    sodium chloride (NS) flush 5-40 mL  5-40 mL IntraVENous PRN    ARIPiprazole (ABILIFY) tablet 20 mg  20 mg Oral DAILY    hydrALAZINE (APRESOLINE) tablet 50 mg  50 mg Oral TID    sodium chloride (NS) flush 5-40 mL  5-40 mL IntraVENous Q8H    sodium chloride (NS) flush 5-40 mL  5-40 mL IntraVENous PRN    acetaminophen (TYLENOL) tablet 650 mg  650 mg Oral Q6H PRN    Or    acetaminophen (TYLENOL) suppository 650 mg  650 mg Rectal Q6H PRN    polyethylene glycol (MIRALAX) packet 17 g  17 g Oral DAILY PRN    ondansetron (ZOFRAN ODT) tablet 4 mg  4 mg Oral Q8H PRN    Or    ondansetron (ZOFRAN) injection 4 mg  4 mg IntraVENous Q6H PRN    apixaban (ELIQUIS) tablet 2.5 mg  2.5 mg Oral BID    amLODIPine (NORVASC) tablet 2.5 mg  2.5 mg Oral DAILY    atorvastatin (LIPITOR) tablet 40 mg  40 mg Oral QHS    busPIRone (BUSPAR) tablet 5 mg  5 mg Oral TID    carbidopa-levodopa (SINEMET)  mg per tablet 2 Tablet  2 Tablet Oral QID    carvediloL (COREG) tablet 3.125 mg  3.125 mg Oral BID WITH MEALS    DULoxetine (CYMBALTA) capsule 120 mg  120 mg Oral DAILY    gabapentin (NEURONTIN) capsule 100 mg  100 mg Oral QHS    melatonin tablet 6 mg  6 mg Oral QHS    pantoprazole (PROTONIX) tablet 40 mg  40 mg Oral ACB     ______________________________________________________________________  EXPECTED LENGTH OF STAY: 3d 19h  ACTUAL LENGTH OF STAY:          11                 Lluvia Betancourt MD

## 2021-12-30 ENCOUNTER — HOSPITAL ENCOUNTER (INPATIENT)
Age: 81
LOS: 7 days | Discharge: NURSING, REHAB OR LONG-TERM CARE | DRG: 291 | End: 2022-01-06
Attending: EMERGENCY MEDICINE | Admitting: HOSPITALIST
Payer: MEDICARE

## 2021-12-30 ENCOUNTER — APPOINTMENT (OUTPATIENT)
Dept: ULTRASOUND IMAGING | Age: 81
DRG: 291 | End: 2021-12-30
Attending: HOSPITALIST
Payer: MEDICARE

## 2021-12-30 ENCOUNTER — APPOINTMENT (OUTPATIENT)
Dept: GENERAL RADIOLOGY | Age: 81
DRG: 291 | End: 2021-12-30
Attending: EMERGENCY MEDICINE
Payer: MEDICARE

## 2021-12-30 DIAGNOSIS — I25.10 CORONARY ARTERY DISEASE INVOLVING NATIVE CORONARY ARTERY OF NATIVE HEART WITHOUT ANGINA PECTORIS: ICD-10-CM

## 2021-12-30 DIAGNOSIS — N28.9 RENAL INSUFFICIENCY: ICD-10-CM

## 2021-12-30 DIAGNOSIS — R77.8 ELEVATED TROPONIN: ICD-10-CM

## 2021-12-30 DIAGNOSIS — I48.19 PERSISTENT ATRIAL FIBRILLATION (HCC): ICD-10-CM

## 2021-12-30 DIAGNOSIS — J81.0 ACUTE PULMONARY EDEMA (HCC): ICD-10-CM

## 2021-12-30 DIAGNOSIS — J96.01 ACUTE RESPIRATORY FAILURE WITH HYPOXIA (HCC): Primary | ICD-10-CM

## 2021-12-30 LAB
ALBUMIN SERPL-MCNC: 3.3 G/DL (ref 3.5–5)
ALBUMIN/GLOB SERPL: 1.1 {RATIO} (ref 1.1–2.2)
ALP SERPL-CCNC: 95 U/L (ref 45–117)
ALT SERPL-CCNC: 7 U/L (ref 12–78)
ANION GAP SERPL CALC-SCNC: 11 MMOL/L (ref 5–15)
APPEARANCE UR: ABNORMAL
AST SERPL-CCNC: 13 U/L (ref 15–37)
ATRIAL RATE: 127 BPM
ATRIAL RATE: 67 BPM
BACTERIA URNS QL MICRO: ABNORMAL /HPF
BASOPHILS # BLD: 0 K/UL (ref 0–0.1)
BASOPHILS NFR BLD: 0 % (ref 0–1)
BILIRUB SERPL-MCNC: 0.4 MG/DL (ref 0.2–1)
BILIRUB UR QL: NEGATIVE
BNP SERPL-MCNC: ABNORMAL PG/ML
BUN SERPL-MCNC: 45 MG/DL (ref 6–20)
BUN/CREAT SERPL: 19 (ref 12–20)
CALCIUM SERPL-MCNC: 8.5 MG/DL (ref 8.5–10.1)
CALCULATED P AXIS, ECG09: 58 DEGREES
CALCULATED P AXIS, ECG09: 82 DEGREES
CALCULATED R AXIS, ECG10: 6 DEGREES
CALCULATED R AXIS, ECG10: 67 DEGREES
CALCULATED T AXIS, ECG11: -111 DEGREES
CALCULATED T AXIS, ECG11: -172 DEGREES
CHLORIDE SERPL-SCNC: 105 MMOL/L (ref 97–108)
CO2 SERPL-SCNC: 21 MMOL/L (ref 21–32)
COLOR UR: ABNORMAL
COMMENT, HOLDF: NORMAL
COVID-19 RAPID TEST, COVR: NOT DETECTED
CREAT SERPL-MCNC: 2.31 MG/DL (ref 0.55–1.02)
CREAT UR-MCNC: 75.4 MG/DL
DIAGNOSIS, 93000: NORMAL
DIAGNOSIS, 93000: NORMAL
DIFFERENTIAL METHOD BLD: ABNORMAL
EOSINOPHIL # BLD: 0.1 K/UL (ref 0–0.4)
EOSINOPHIL NFR BLD: 2 % (ref 0–7)
EPITH CASTS URNS QL MICRO: ABNORMAL /LPF
ERYTHROCYTE [DISTWIDTH] IN BLOOD BY AUTOMATED COUNT: 14.3 % (ref 11.5–14.5)
GLOBULIN SER CALC-MCNC: 3.1 G/DL (ref 2–4)
GLUCOSE SERPL-MCNC: 242 MG/DL (ref 65–100)
GLUCOSE UR STRIP.AUTO-MCNC: NEGATIVE MG/DL
HCT VFR BLD AUTO: 27.2 % (ref 35–47)
HGB BLD-MCNC: 8.6 G/DL (ref 11.5–16)
HGB UR QL STRIP: ABNORMAL
IMM GRANULOCYTES # BLD AUTO: 0.1 K/UL (ref 0–0.04)
IMM GRANULOCYTES NFR BLD AUTO: 1 % (ref 0–0.5)
KETONES UR QL STRIP.AUTO: NEGATIVE MG/DL
LACTATE SERPL-SCNC: 0.7 MMOL/L (ref 0.4–2)
LACTATE SERPL-SCNC: 2.6 MMOL/L (ref 0.4–2)
LEUKOCYTE ESTERASE UR QL STRIP.AUTO: ABNORMAL
LYMPHOCYTES # BLD: 0.3 K/UL (ref 0.8–3.5)
LYMPHOCYTES NFR BLD: 5 % (ref 12–49)
MCH RBC QN AUTO: 32.5 PG (ref 26–34)
MCHC RBC AUTO-ENTMCNC: 31.6 G/DL (ref 30–36.5)
MCV RBC AUTO: 102.6 FL (ref 80–99)
MONOCYTES # BLD: 0.3 K/UL (ref 0–1)
MONOCYTES NFR BLD: 5 % (ref 5–13)
NEUTS SEG # BLD: 6.1 K/UL (ref 1.8–8)
NEUTS SEG NFR BLD: 87 % (ref 32–75)
NITRITE UR QL STRIP.AUTO: NEGATIVE
NRBC # BLD: 0 K/UL (ref 0–0.01)
NRBC BLD-RTO: 0 PER 100 WBC
P-R INTERVAL, ECG05: 170 MS
P-R INTERVAL, ECG05: 170 MS
PH UR STRIP: 6 [PH] (ref 5–8)
PLATELET # BLD AUTO: 339 K/UL (ref 150–400)
PMV BLD AUTO: 9 FL (ref 8.9–12.9)
POTASSIUM SERPL-SCNC: 3.6 MMOL/L (ref 3.5–5.1)
PROCALCITONIN SERPL-MCNC: <0.05 NG/ML
PROT SERPL-MCNC: 6.4 G/DL (ref 6.4–8.2)
PROT UR STRIP-MCNC: 100 MG/DL
PROT UR-MCNC: 157 MG/DL (ref 0–11.9)
PROT/CREAT UR-RTO: 2.1
Q-T INTERVAL, ECG07: 320 MS
Q-T INTERVAL, ECG07: 434 MS
QRS DURATION, ECG06: 106 MS
QRS DURATION, ECG06: 138 MS
QTC CALCULATION (BEZET), ECG08: 458 MS
QTC CALCULATION (BEZET), ECG08: 465 MS
RBC # BLD AUTO: 2.65 M/UL (ref 3.8–5.2)
RBC #/AREA URNS HPF: ABNORMAL /HPF (ref 0–5)
RBC MORPH BLD: ABNORMAL
SAMPLES BEING HELD,HOLD: NORMAL
SODIUM SERPL-SCNC: 137 MMOL/L (ref 136–145)
SOURCE, COVRS: NORMAL
SP GR UR REFRACTOMETRY: 1.02 (ref 1–1.03)
TROPONIN-HIGH SENSITIVITY: 237 NG/L (ref 0–51)
TROPONIN-HIGH SENSITIVITY: 383 NG/L (ref 0–51)
TROPONIN-HIGH SENSITIVITY: 498 NG/L (ref 0–51)
UR CULT HOLD, URHOLD: NORMAL
UROBILINOGEN UR QL STRIP.AUTO: 1 EU/DL (ref 0.2–1)
VENTRICULAR RATE, ECG03: 127 BPM
VENTRICULAR RATE, ECG03: 67 BPM
WBC # BLD AUTO: 6.9 K/UL (ref 3.6–11)
WBC URNS QL MICRO: ABNORMAL /HPF (ref 0–4)

## 2021-12-30 PROCEDURE — 74011000250 HC RX REV CODE- 250: Performed by: NURSE PRACTITIONER

## 2021-12-30 PROCEDURE — 93005 ELECTROCARDIOGRAM TRACING: CPT

## 2021-12-30 PROCEDURE — 83605 ASSAY OF LACTIC ACID: CPT

## 2021-12-30 PROCEDURE — 84484 ASSAY OF TROPONIN QUANT: CPT

## 2021-12-30 PROCEDURE — 99285 EMERGENCY DEPT VISIT HI MDM: CPT

## 2021-12-30 PROCEDURE — 80053 COMPREHEN METABOLIC PANEL: CPT

## 2021-12-30 PROCEDURE — 74011250636 HC RX REV CODE- 250/636: Performed by: EMERGENCY MEDICINE

## 2021-12-30 PROCEDURE — 71045 X-RAY EXAM CHEST 1 VIEW: CPT

## 2021-12-30 PROCEDURE — 74011250637 HC RX REV CODE- 250/637: Performed by: HOSPITALIST

## 2021-12-30 PROCEDURE — 74011000250 HC RX REV CODE- 250: Performed by: HOSPITALIST

## 2021-12-30 PROCEDURE — 84156 ASSAY OF PROTEIN URINE: CPT

## 2021-12-30 PROCEDURE — 94761 N-INVAS EAR/PLS OXIMETRY MLT: CPT

## 2021-12-30 PROCEDURE — 87635 SARS-COV-2 COVID-19 AMP PRB: CPT

## 2021-12-30 PROCEDURE — 74011000258 HC RX REV CODE- 258: Performed by: EMERGENCY MEDICINE

## 2021-12-30 PROCEDURE — 76775 US EXAM ABDO BACK WALL LIM: CPT

## 2021-12-30 PROCEDURE — 96365 THER/PROPH/DIAG IV INF INIT: CPT

## 2021-12-30 PROCEDURE — 36415 COLL VENOUS BLD VENIPUNCTURE: CPT

## 2021-12-30 PROCEDURE — 96375 TX/PRO/DX INJ NEW DRUG ADDON: CPT

## 2021-12-30 PROCEDURE — 74011250637 HC RX REV CODE- 250/637: Performed by: EMERGENCY MEDICINE

## 2021-12-30 PROCEDURE — 81001 URINALYSIS AUTO W/SCOPE: CPT

## 2021-12-30 PROCEDURE — 85025 COMPLETE CBC W/AUTO DIFF WBC: CPT

## 2021-12-30 PROCEDURE — 65210000002 HC RM PRIVATE GYN

## 2021-12-30 PROCEDURE — 74011250637 HC RX REV CODE- 250/637: Performed by: NURSE PRACTITIONER

## 2021-12-30 PROCEDURE — 83880 ASSAY OF NATRIURETIC PEPTIDE: CPT

## 2021-12-30 PROCEDURE — 94640 AIRWAY INHALATION TREATMENT: CPT

## 2021-12-30 PROCEDURE — 84145 PROCALCITONIN (PCT): CPT

## 2021-12-30 PROCEDURE — 87205 SMEAR GRAM STAIN: CPT

## 2021-12-30 PROCEDURE — 65660000000 HC RM CCU STEPDOWN

## 2021-12-30 PROCEDURE — 94664 DEMO&/EVAL PT USE INHALER: CPT

## 2021-12-30 PROCEDURE — 74011000250 HC RX REV CODE- 250: Performed by: EMERGENCY MEDICINE

## 2021-12-30 RX ORDER — DILTIAZEM HYDROCHLORIDE 120 MG/1
120 CAPSULE, EXTENDED RELEASE ORAL DAILY
COMMUNITY
End: 2022-01-14

## 2021-12-30 RX ORDER — NITROGLYCERIN 0.4 MG/1
0.4 TABLET SUBLINGUAL
Status: DISCONTINUED | OUTPATIENT
Start: 2021-12-30 | End: 2022-01-06 | Stop reason: HOSPADM

## 2021-12-30 RX ORDER — ALBUTEROL SULFATE 0.83 MG/ML
5 SOLUTION RESPIRATORY (INHALATION)
Status: COMPLETED | OUTPATIENT
Start: 2021-12-31 | End: 2021-12-31

## 2021-12-30 RX ORDER — SENNOSIDES 8.6 MG/1
1 TABLET ORAL
COMMUNITY
End: 2022-01-06

## 2021-12-30 RX ORDER — CARVEDILOL 3.12 MG/1
3.12 TABLET ORAL 2 TIMES DAILY WITH MEALS
Status: DISCONTINUED | OUTPATIENT
Start: 2021-12-30 | End: 2022-01-03

## 2021-12-30 RX ORDER — GABAPENTIN 100 MG/1
100 CAPSULE ORAL
Status: DISCONTINUED | OUTPATIENT
Start: 2021-12-30 | End: 2022-01-06 | Stop reason: HOSPADM

## 2021-12-30 RX ORDER — CARBIDOPA AND LEVODOPA 25; 100 MG/1; MG/1
2 TABLET ORAL 4 TIMES DAILY
Status: DISCONTINUED | OUTPATIENT
Start: 2021-12-30 | End: 2022-01-06 | Stop reason: HOSPADM

## 2021-12-30 RX ORDER — HYDRALAZINE HYDROCHLORIDE 50 MG/1
50 TABLET, FILM COATED ORAL 3 TIMES DAILY
Status: DISCONTINUED | OUTPATIENT
Start: 2021-12-31 | End: 2021-12-30

## 2021-12-30 RX ORDER — CLOPIDOGREL BISULFATE 75 MG/1
75 TABLET ORAL
Status: DISCONTINUED | OUTPATIENT
Start: 2021-12-31 | End: 2022-01-06 | Stop reason: HOSPADM

## 2021-12-30 RX ORDER — LEVOFLOXACIN 5 MG/ML
750 INJECTION, SOLUTION INTRAVENOUS ONCE
Status: COMPLETED | OUTPATIENT
Start: 2021-12-30 | End: 2021-12-30

## 2021-12-30 RX ORDER — BUMETANIDE 0.25 MG/ML
1 INJECTION INTRAMUSCULAR; INTRAVENOUS
Status: COMPLETED | OUTPATIENT
Start: 2021-12-30 | End: 2021-12-30

## 2021-12-30 RX ORDER — PANTOPRAZOLE SODIUM 40 MG/1
40 TABLET, DELAYED RELEASE ORAL
Status: DISCONTINUED | OUTPATIENT
Start: 2021-12-31 | End: 2022-01-06 | Stop reason: HOSPADM

## 2021-12-30 RX ORDER — LANOLIN ALCOHOL/MO/W.PET/CERES
6 CREAM (GRAM) TOPICAL
Status: DISCONTINUED | OUTPATIENT
Start: 2021-12-30 | End: 2022-01-06 | Stop reason: HOSPADM

## 2021-12-30 RX ORDER — POLYETHYLENE GLYCOL 3350 17 G/17G
17 POWDER, FOR SOLUTION ORAL
Status: DISCONTINUED | OUTPATIENT
Start: 2021-12-30 | End: 2022-01-06 | Stop reason: HOSPADM

## 2021-12-30 RX ORDER — BUMETANIDE 0.25 MG/ML
1 INJECTION INTRAMUSCULAR; INTRAVENOUS ONCE
Status: COMPLETED | OUTPATIENT
Start: 2021-12-31 | End: 2021-12-30

## 2021-12-30 RX ORDER — IPRATROPIUM BROMIDE AND ALBUTEROL SULFATE 2.5; .5 MG/3ML; MG/3ML
SOLUTION RESPIRATORY (INHALATION)
Status: COMPLETED
Start: 2021-12-30 | End: 2021-12-31

## 2021-12-30 RX ORDER — IPRATROPIUM BROMIDE AND ALBUTEROL SULFATE 2.5; .5 MG/3ML; MG/3ML
3 SOLUTION RESPIRATORY (INHALATION)
Status: COMPLETED | OUTPATIENT
Start: 2021-12-31 | End: 2021-12-31

## 2021-12-30 RX ORDER — AMLODIPINE BESYLATE 5 MG/1
2.5 TABLET ORAL DAILY
Status: DISCONTINUED | OUTPATIENT
Start: 2021-12-31 | End: 2021-12-30

## 2021-12-30 RX ORDER — DILTIAZEM HYDROCHLORIDE 120 MG/1
120 CAPSULE, COATED, EXTENDED RELEASE ORAL DAILY
Status: DISCONTINUED | OUTPATIENT
Start: 2021-12-30 | End: 2022-01-06 | Stop reason: HOSPADM

## 2021-12-30 RX ORDER — AMOXICILLIN 250 MG
1 CAPSULE ORAL
Status: DISCONTINUED | OUTPATIENT
Start: 2021-12-30 | End: 2022-01-06 | Stop reason: HOSPADM

## 2021-12-30 RX ORDER — BUSPIRONE HYDROCHLORIDE 5 MG/1
5 TABLET ORAL 3 TIMES DAILY
Status: DISCONTINUED | OUTPATIENT
Start: 2021-12-30 | End: 2022-01-06 | Stop reason: HOSPADM

## 2021-12-30 RX ORDER — CILOSTAZOL 50 MG/1
100 TABLET ORAL
Status: DISCONTINUED | OUTPATIENT
Start: 2021-12-30 | End: 2022-01-06 | Stop reason: HOSPADM

## 2021-12-30 RX ORDER — DULOXETIN HYDROCHLORIDE 60 MG/1
120 CAPSULE, DELAYED RELEASE ORAL DAILY
Status: DISCONTINUED | OUTPATIENT
Start: 2021-12-31 | End: 2022-01-06 | Stop reason: HOSPADM

## 2021-12-30 RX ORDER — MELATONIN
1000 DAILY
Status: DISCONTINUED | OUTPATIENT
Start: 2021-12-31 | End: 2022-01-06 | Stop reason: HOSPADM

## 2021-12-30 RX ORDER — ARIPIPRAZOLE 5 MG/1
20 TABLET ORAL DAILY
Status: DISCONTINUED | OUTPATIENT
Start: 2021-12-31 | End: 2022-01-06 | Stop reason: HOSPADM

## 2021-12-30 RX ORDER — UREA 10 %
100 LOTION (ML) TOPICAL DAILY
Status: DISCONTINUED | OUTPATIENT
Start: 2021-12-31 | End: 2022-01-06 | Stop reason: HOSPADM

## 2021-12-30 RX ORDER — ALBUTEROL SULFATE 0.83 MG/ML
SOLUTION RESPIRATORY (INHALATION)
Status: COMPLETED
Start: 2021-12-30 | End: 2021-12-31

## 2021-12-30 RX ORDER — ATORVASTATIN CALCIUM 40 MG/1
40 TABLET, FILM COATED ORAL
Status: DISCONTINUED | OUTPATIENT
Start: 2021-12-30 | End: 2022-01-06 | Stop reason: HOSPADM

## 2021-12-30 RX ORDER — ASPIRIN 325 MG
325 TABLET ORAL
Status: COMPLETED | OUTPATIENT
Start: 2021-12-30 | End: 2021-12-30

## 2021-12-30 RX ORDER — HYDRALAZINE HYDROCHLORIDE 50 MG/1
50 TABLET, FILM COATED ORAL 3 TIMES DAILY
Status: DISCONTINUED | OUTPATIENT
Start: 2021-12-30 | End: 2022-01-06 | Stop reason: HOSPADM

## 2021-12-30 RX ORDER — BUMETANIDE 0.25 MG/ML
1 INJECTION INTRAMUSCULAR; INTRAVENOUS 2 TIMES DAILY
Status: DISCONTINUED | OUTPATIENT
Start: 2021-12-30 | End: 2022-01-03

## 2021-12-30 RX ADMIN — SODIUM CHLORIDE 250 ML: 900 INJECTION, SOLUTION INTRAVENOUS at 08:52

## 2021-12-30 RX ADMIN — Medication 6 MG: at 21:57

## 2021-12-30 RX ADMIN — PIPERACILLIN AND TAZOBACTAM 3.38 G: 3; .375 INJECTION, POWDER, LYOPHILIZED, FOR SOLUTION INTRAVENOUS at 11:17

## 2021-12-30 RX ADMIN — BUSPIRONE HYDROCHLORIDE 5 MG: 5 TABLET ORAL at 20:11

## 2021-12-30 RX ADMIN — ATORVASTATIN CALCIUM 40 MG: 40 TABLET, FILM COATED ORAL at 20:10

## 2021-12-30 RX ADMIN — CARVEDILOL 3.12 MG: 3.12 TABLET, FILM COATED ORAL at 19:32

## 2021-12-30 RX ADMIN — ALBUTEROL SULFATE 5 MG: 2.5 SOLUTION RESPIRATORY (INHALATION) at 23:19

## 2021-12-30 RX ADMIN — DILTIAZEM HYDROCHLORIDE 120 MG: 120 CAPSULE, COATED, EXTENDED RELEASE ORAL at 15:27

## 2021-12-30 RX ADMIN — DOCUSATE SODIUM 50 MG AND SENNOSIDES 8.6 MG 1 TABLET: 8.6; 5 TABLET, FILM COATED ORAL at 21:57

## 2021-12-30 RX ADMIN — GABAPENTIN 100 MG: 100 CAPSULE ORAL at 21:57

## 2021-12-30 RX ADMIN — ASPIRIN 325 MG ORAL TABLET 325 MG: 325 PILL ORAL at 10:25

## 2021-12-30 RX ADMIN — LEVOFLOXACIN 750 MG: 750 INJECTION, SOLUTION INTRAVENOUS at 13:01

## 2021-12-30 RX ADMIN — APIXABAN 2.5 MG: 2.5 TABLET, FILM COATED ORAL at 20:10

## 2021-12-30 RX ADMIN — CILOSTAZOL 100 MG: 50 TABLET ORAL at 20:11

## 2021-12-30 RX ADMIN — CARBIDOPA AND LEVODOPA 2 TABLET: 25; 100 TABLET ORAL at 21:57

## 2021-12-30 RX ADMIN — HYDRALAZINE HYDROCHLORIDE 50 MG: 50 TABLET, FILM COATED ORAL at 21:57

## 2021-12-30 RX ADMIN — IPRATROPIUM BROMIDE AND ALBUTEROL SULFATE 3 ML: .5; 2.5 SOLUTION RESPIRATORY (INHALATION) at 23:10

## 2021-12-30 RX ADMIN — BUMETANIDE 1 MG: 0.25 INJECTION INTRAMUSCULAR; INTRAVENOUS at 11:17

## 2021-12-30 RX ADMIN — BUMETANIDE 1 MG: 0.25 INJECTION INTRAMUSCULAR; INTRAVENOUS at 23:27

## 2021-12-30 RX ADMIN — BUMETANIDE 1 MG: 0.25 INJECTION INTRAMUSCULAR; INTRAVENOUS at 19:32

## 2021-12-30 NOTE — ED PROVIDER NOTES
Ms. Jessenia Carney is an 61-year-old female who presents to the ER with complaints of shortness of breath. Per EMS report, her symptoms started several hours prior to arrival. Per their report, she is on 2 L of home oxygen. Her oxygen saturations on her home 2 L were 81%. Per the patient, she does not use home oxygen. She was placed on a nonrebreather with oxygen saturations in the mid 90s. Primus, she had reported some chest pain earlier. Patient currently reported shortness of breath that started several hours before. She denies shortness of breath. She denies cough, however, she is coughing during the exam. She is not sure if she has been vaccinated for COVID-19, however, she thinks that she has. She denies any leg swelling. She denies any other complaints. The patient's history seems to be limited by her baseline mental status.            Past Medical History:   Diagnosis Date    Anxiety disorder     Atrial fibrillation (HCC)     CAD (coronary artery disease) 2007    stents, CABG x 3v    Carotid stenosis     Cervical stenosis of spinal canal 07/2019    Chronic kidney disease     Cough     CVA (cerebral vascular accident) ([facility] [address] 07/2019    left lacunar infarct at head of caudate    Depression     AND CHRONIC ANXIETY    Diabetes (HCC)     GERD (gastroesophageal reflux disease)     High cholesterol     History of peptic ulcer     Bleeding ulcer with increased NSAID use    Hypertension     Left carotid stenosis 07/2019    s/p left CEA with Dr. Chad Bean, old 2007    PUD (peptic ulcer disease)     Stroke ([facility] [address]     Tremor     Valvular heart disease        Past Surgical History:   Procedure Laterality Date    COLONOSCOPY N/A 12/17/2021    COLONOSCOPY   :- performed by Pamela [facility]han [facility] at [facility] ENDOSCOPY    HX CAROTID ENDARTERECTOMY  07/2019    HX CORONARY ARTERY BYPASS GRAFT      HX TONSILLECTOMY  1963    [place] CARDIAC SURG PROCEDURE UNLIST      CABG X3 VESSEL    [place] TOTAL KNEE ARTHROPLASTY Right 2015         Family History:   Problem Relation Age of Onset    Heart Attack Mother 72        Dec 82yo    Hypertension Mother     Other Father         Unknown    Parkinson's Disease Brother     Anesth Problems Neg Hx        Social History     Socioeconomic History    Marital status: SINGLE     Spouse name: Not on file    Number of children: Not on file    Years of education: Not on file    Highest education level: Not on file   Occupational History    Occupation: Retired realestate/teacher   Tobacco Use    Smoking status: Former Smoker     Packs/day: 0.25     Years: 5.00     Pack years: 1.25     Types: Cigarettes     Quit date:      Years since quittin.0    Smokeless tobacco: Never Used   Substance and Sexual Activity    Alcohol use: Not Currently     Comment: Rare    Drug use: No    Sexual activity: Not on file   Other Topics Concern    Not on file   Social History Narrative    Lives in [address]     Social Determinants of Health     Financial Resource Strain:     Difficulty of Paying Living Expenses: Not on file   Food Insecurity:     Worried About [address] in the Last Year: Not on file    [address] in the Last Year: Not on file   Transportation Needs:     Lack of Transportation (Medical): Not on file    Lack of Transportation (Non-Medical):  Not on file   Physical Activity:     Days of Exercise per Week: Not on file    Minutes of Exercise per Session: Not on file   Stress:     Feeling of Stress : Not on file   Social Connections:     Frequency of Communication with Friends and Family: Not on file    Frequency of Social Gatherings with Friends and Family: Not on file    Attends Taoist Services: Not on file    Active Member of Clubs or Organizations: Not on file    Attends Club or Organization Meetings: Not on file    Marital Status: Not on file   Intimate Partner Violence:     Fear of Current or Ex-Partner: Not on file   [address] [place] [address] Emotionally Abused: Not on file    Physically Abused: Not on file    Sexually Abused: Not on file   Housing Stability:     Unable to Pay for Housing in the Last Year: Not on file    Number of Places Lived in the Last Year: Not on file    Unstable Housing in the Last Year: Not on file         ALLERGIES: Patient has no known allergies. Review of Systems   Unable to perform ROS: Other (Baseline mental status)   Respiratory: Positive for shortness of breath. Vitals:    12/30/21 0823   BP: 126/82   Pulse: (!) 148   Resp: 24   Temp: 98.2 °F (36.8 °C)   SpO2: 96%   Weight: 79.2 kg (174 lb 9.7 oz)   Height: 5' 5\" (1.651 m)            Physical Exam       Vital signs reviewed. Nursing notes reviewed.     Const:  No acute distress, well developed, well nourished  Head:  Atraumatic, normocephalic  Eyes:  PERRL, conjunctiva normal, no scleral icterus  Neck:  Supple, trachea midline  Cardiovascular: Tachycardic, irregularly irregular  Resp: Moderate resp distress, increased work of breathing, diffuse wheezes that are most pronounced over the upper airway, no audible wheezing prior to when I auscultated her lungs, however, no sustained to her lungs, no wheezing was audible without a stethoscope, no rhonchi, no rales,  Abd:  Soft, non-tender, non-distended  MSK:  No pedal edema, normal ROM, no wounds to the feet  Neuro:  Alert and oriented x3, no cranial nerve defect  Skin:  Warm, dry, intact  Psych: normal mood and affect, behavior is normal, judgement and thought content is normal        MDM  Number of Diagnoses or Management Options     Amount and/or Complexity of Data Reviewed  Clinical lab tests: ordered and reviewed  Tests in the radiology section of CPT®: ordered and reviewed  Review and summarize past medical records: yes    Patient Progress  Patient progress: stable    Perfect Serve Consult for Admission  12:34 PM    ED Room Number: ER25/25  Patient Name and age:  Sherren Providence [de-identified] y.o.  female  Working Diagnosis:   1. Acute respiratory failure with hypoxia ([address]    2. Acute pulmonary edema (HCC)    3. Renal insufficiency        COVID-19 Suspicion:  yes  Sepsis present:  no  Reassessment needed: no  Code Status:  Do Not Resuscitate  Readmission: yes  Isolation Requirements:  yes  Recommended Level of Care:  telemetry  Department:[facility] Adult ED - [phone]          Ms. Virginia Benton is an [de-identified] female who presents to the ER with complaints of SOB and chest pain. Pt's first EKG showed a wide complex rhythm. I was initially concerned about possible afib. However, her repeat EKG shows a sinus rhythm with a narrow complex. Her rate also improved dramatically. Pt. Was an elevated BNP. Xray shows edema. I have given her a smaller dose of bumex. She is noted to have an elevated creatinine. Normal WBC but still has consolidations on xray. I have covered her with abx for possible pneumonia. Pt.  To be evaluated for admission by the hospitalist.      Procedures

## 2021-12-30 NOTE — PROGRESS NOTES
Pharmacist Admission Medication Reconciliation:     Reviewed PTA medication list with the paperwork from [facility].  Rx Query data available? ¹ YES    Medication changes (since last review):   Amlodipine was discontinued this morning, and Diltiazem  mg was ordered but not yet given.  Removed: Senna/Docusate (change to plain senna), potassium   Otherwise, a very accurate PTA medication list.    Thank you for allowing me to participate in this patient's care. Please call [phone] or [phone] with any questions. Alejandra Luis, Pharmacist          [facility] pharmacy benefit data reflects medications filled and processed through the patient's insurance,   however this data does NOT capture whether the medication was picked up or is currently being taken by the patient. Prior to Admission Medications:   Prior to Admission Medications   Prescriptions Last Dose Informant Taking? ARIPiprazole (ABILIFY) 20 mg tablet   Yes   Sig: Take 20 mg by mouth daily. Cholecalciferol, Vitamin D3, 1,000 unit cap   Yes   Sig: Take 1,000 Units by mouth daily. DULoxetine (CYMBALTA) 60 mg capsule   Yes   Sig: Take 120 mg by mouth daily. Indications: anxiousness associated with depression   acetaminophen (TYLENOL) 325 mg tablet   Yes   Sig: Take 650 mg by mouth every six (6) hours as needed for Pain or Fever. amLODIPine (NORVASC) 2.5 mg tablet   Yes   Sig: Take 1 Tablet by mouth daily. apixaban (ELIQUIS) 2.5 mg tablet   Yes   Sig: Take 1 Tablet by mouth two (2) times a day. atorvastatin (LIPITOR) 40 mg tablet   Yes   Sig: Take 1 Tablet by mouth nightly. busPIRone (BUSPAR) 5 mg tablet   Yes   Sig: Take 1 Tab by mouth three (3) times daily. carbidopa-levodopa (SINEMET)  mg per tablet   Yes   Sig: Take 2 Tabs by mouth four (4) times daily. carvediloL (COREG) 3.125 mg tablet   Yes   Sig: Take 1 Tablet by mouth two (2) times daily (with meals).    cilostazoL (PLETAL) 100 mg tablet   Yes   Sig: Take 100 mg by mouth Before breakfast and dinner. clopidogreL (Plavix) 75 mg tab   Yes   Sig: Take 75 mg by mouth daily (after breakfast). cyanocobalamin (VITAMIN B12) 100 mcg tablet   Yes   Sig: Take 100 mcg by mouth daily. furosemide (LASIX) 40 mg tablet   Yes   Sig: Take 1 Tablet by mouth daily. gabapentin (NEURONTIN) 100 mg capsule   Yes   Sig: Take 100 mg by mouth nightly. hydrALAZINE (APRESOLINE) 50 mg tablet   Yes   Sig: Take 1 Tablet by mouth three (3) times daily. melatonin 3 mg tablet   Yes   Sig: Take 6 mg by mouth nightly. multivitamins-minerals-lutein (CENTRUM SILVER) tab tablet   Yes   Sig: Take 1 Tablet by mouth daily. nitroglycerin (Nitrostat) 0.4 mg SL tablet   Yes   Si.4 mg by SubLINGual route every five (5) minutes as needed for Chest Pain. Up to 3 doses. ondansetron (ZOFRAN ODT) 4 mg disintegrating tablet   Yes   Sig: Take 1 Tablet by mouth every eight (8) hours as needed for Nausea or Vomiting. pantoprazole (PROTONIX) 40 mg tablet   Yes   Sig: Take 40 mg by mouth Daily (before breakfast). Indications: h/o bleeding ulcer with nsaid   polyethylene glycol (MIRALAX) 17 gram packet   Yes   Sig: Take 1 Packet by mouth daily as needed for Constipation for up to 30 days. senna (Senna) 8.6 mg tablet   Yes   Sig: Take 1 Tablet by mouth nightly. vit C,K-Ib-jwvpx-lutein-zeaxan (PRESERVISION AREDS-2) 198-627-79-1 mg-unit-mg-mg cap capsule   Yes   Sig: Take 1 Capsule by mouth two (2) times a day.       Facility-Administered Medications: None

## 2021-12-30 NOTE — CONSULTS
NEPHROLOGY CONSULT NOTE     Patient: Frank Gonzales MRN: [id number]  PCP: Christine Stevens DO   :     1940  Age:   [de-identified] y.o. Sex:  female      Referring physician: Lynnwood Fothergill, MD  Reason for consultation: [de-identified] y.o. female with CHF exacerbation ([facility] [F97.4] complicated by MORENO   Admission Date: 2021  8:16 AM  LOS: 0 days      ASSESSMENT and PLAN :   MORENO on CKD:  - diff: progressive disease vs CRS vs obstruction  - renal U/S  - UA, urine, PCR, urine eos  - cont current diuretics  - consider repeat ECHO to eval LV function  - daily labs and strict I/Os     CKD IIIa:  - baseline Cr around 1.4  - presumed chronic HTN    HTN:  - BP stable    Hypervolemia:  - diuretics as above    Hypoxia:  - presumed 2/2 CHF  - CXR pending, rapid COVID neg     Parkinsons  Hx of CVA  Afib with RVR  PAD w/ L CEA  CAD s/p CABG  AAA  HFpEF: last EF 50-55%     Active Problems / Assessment AAActive  : Active Problems:    CHF exacerbation ([facility] (2020)         Subjective:   HPI: Frank Gonzales is a [de-identified] y.o.  female who has been admitted to the hospital for SOB. She has a hx of CKD 3, baseline Cr around 1.4, hx of afib, CAD, prior CVA, Parkinson's who presented with hypoxia and SOB. Rapid COVID neg. Clinically, in CHF. CXR pending. Given bumex in the ER and feeling better. Cr was 2.3 this AM.  She was last seen by us in November in the hospital.  Renal function was stable at that time. Never saw us in follow up. Currently getting renal U/S.  BP stable. SOB improving. No cp, fevers or chills reported.     Past Medical Hx:   Past Medical History:   Diagnosis Date    Anxiety disorder     [facility] ([facility]     CAD (coronary artery disease) 2007    stents, CABG x 3v    Carotid stenosis     Cervical stenosis of spinal canal 2019    Chronic kidney disease     Cough     CVA (cerebral vascular accident) ([facility] 2019    left lacunar infarct at head of caudate    Depression     AND CHRONIC ANXIETY  Diabetes [facility]     GERD (gastroesophageal reflux disease)     High cholesterol     History of peptic ulcer     Bleeding ulcer with increased NSAID use    Hypertension     Left carotid stenosis 07/2019    s/p left CEA with Dr. Andra Markham, old 2007    PUD (peptic ulcer disease)     Stroke ([facility]     Tremor     Valvular heart disease         Past Surgical Hx:     Past Surgical History:   Procedure Laterality Date    COLONOSCOPY N/A 12/17/2021    COLONOSCOPY   :- performed by Waleska Winn MD at [address] HX CAROTID ENDARTERECTOMY  07/2019    HX CORONARY ARTERY BYPASS GRAFT      HX TONSILLECTOMY  1963    [place] CARDIAC SURG PROCEDURE UNLIST      CABG X3 VESSEL    [place] TOTAL KNEE ARTHROPLASTY Right 2015       Medications:  Prior to Admission medications    Medication Sig Start Date End Date Taking? Authorizing Provider   senna (Senna) 8.6 mg tablet Take 1 Tablet by mouth nightly. Yes Provider, Historical   polyethylene glycol (MIRALAX) 17 gram packet Take 1 Packet by mouth daily as needed for Constipation for up to 30 days. 12/21/21 1/20/22 Yes Anastacio Jolley MD   ondansetron (ZOFRAN ODT) 4 mg disintegrating tablet Take 1 Tablet by mouth every eight (8) hours as needed for Nausea or Vomiting. 12/21/21  Yes Anastacio Jolley MD   furosemide (LASIX) 40 mg tablet Take 1 Tablet by mouth daily. 12/21/21  Yes Anastacio Jolley MD   ARIPiprazole (ABILIFY) 20 mg tablet Take 20 mg by mouth daily. 8/18/21  Yes Provider, Historical   cilostazoL (PLETAL) 100 mg tablet Take 100 mg by mouth Before breakfast and dinner. Yes Provider, Historical   apixaban (ELIQUIS) 2.5 mg tablet Take 1 Tablet by mouth two (2) times a day. 8/26/21  Yes Venessa Murillo MD   amLODIPine (NORVASC) 2.5 mg tablet Take 1 Tablet by mouth daily. 7/16/21  Yes Venessa Murillo MD   hydrALAZINE (APRESOLINE) 50 mg tablet Take 1 Tablet by mouth three (3) times daily.  7/16/21  Yes Venessa Murillo MD   atorvastatin (LIPITOR) 40 mg tablet Take 1 Tablet by mouth nightly. 7/16/21  Yes Lacie Jones MD   carvediloL (COREG) 3.125 mg tablet Take 1 Tablet by mouth two (2) times daily (with meals). 7/16/21  Yes Lacie Jones MD   busPIRone (BUSPAR) 5 mg tablet Take 1 Tab by mouth three (3) times daily. 4/29/21  Yes Gricelda Mcclain MD   gabapentin (NEURONTIN) 100 mg capsule Take 100 mg by mouth nightly. Yes Provider, Historical   melatonin 3 mg tablet Take 6 mg by mouth nightly. Yes Provider, Historical   nitroglycerin (Nitrostat) 0.4 mg SL tablet 0.4 mg by SubLINGual route every five (5) minutes as needed for Chest Pain. Up to 3 doses. Yes Provider, Historical   clopidogreL (Plavix) 75 mg tab Take 75 mg by mouth daily (after breakfast). Yes Provider, Historical   carbidopa-levodopa (SINEMET)  mg per tablet Take 2 Tabs by mouth four (4) times daily. 2/4/20  Yes Lauren Zaidi MD   acetaminophen (TYLENOL) 325 mg tablet Take 650 mg by mouth every six (6) hours as needed for Pain or Fever. Yes Provider, Historical   cyanocobalamin (VITAMIN B12) 100 mcg tablet Take 100 mcg by mouth daily. Yes Provider, Historical   vit C,Y-Az-lfpcz-lutein-zeaxan (PRESERVISION AREDS-2) 153-203-16-1 mg-unit-mg-mg cap capsule Take 1 Capsule by mouth two (2) times a day. Yes Provider, Historical   Cholecalciferol, Vitamin D3, 1,000 unit cap Take 1,000 Units by mouth daily. Yes Provider, Historical   pantoprazole (PROTONIX) 40 mg tablet Take 40 mg by mouth Daily (before breakfast). Indications: h/o bleeding ulcer with nsaid   Yes Provider, Historical   DULoxetine (CYMBALTA) 60 mg capsule Take 120 mg by mouth daily. Indications: anxiousness associated with depression   Yes Provider, Historical   multivitamins-minerals-lutein (CENTRUM SILVER) tab tablet Take 1 Tablet by mouth daily. Yes Provider, Historical   dilTIAZem ER (DILACOR XR) 120 mg capsule Take 120 mg by mouth daily. Ordered this AM at [facility] but had not yet started. Provider, Historical       No Known Allergies    Social Hx:  reports that she quit smoking about 53 years ago. Her smoking use included cigarettes. She has a 1.25 pack-year smoking history. She has never used smokeless tobacco. She reports previous alcohol use. She reports that she does not use drugs. Family History   Problem Relation Age of Onset    Heart Attack Mother 72        Dec 79yo    Hypertension Mother     Other Father         Unknown    Parkinson's Disease Brother     Anesth Problems Neg Hx        Review of Systems:  A twelve point review of system was performed today. Pertinent positives and negatives are mentioned in the HPI. The reminder of the ROS is negative and noncontributory. Objective:    Vitals:    Vitals:    12/30/21 1148 12/30/21 1218 12/30/21 1248 12/30/21 1318   BP: (!) 150/87 138/74 (!) 140/71 (!) 145/73   Pulse: 94 80 76 71   Resp: 24 24 22 18   Temp:    98.6 °F (37 °C)   SpO2: 96% 100% 99% 94%   Weight:       Height:         I&O's:  No intake/output data recorded. Visit Vitals  BP (!) 145/73   Pulse 71   Temp 98.6 °F (37 °C)   Resp 18   Ht 5' 5\" (1.651 m)   Wt 79.2 kg (174 lb 9.7 oz)   SpO2 94%   BMI 29.06 kg/m²       Physical Exam:  General:Awake, alert, mild dyspnea  HEENT: Eyes are PERRL. Conjunctiva without pallor ,erythema. The sclerae without icterus. .   Neck:Supple,no mass palpable  Lungs : Crackles b/l, no wheezing  CVS: RRR, S1 S2 normal, No rub,  trace LE edema  Abdomen: Soft, Non tender, No hepatosplenomegaly, bowel sounds present  Extremities: No cyanosis, No clubbing  Skin: No rash or lesions.   Lymph nodes: No palpable nodes  MS: No joint swelling, erythema, warmth  Neurologic: non focal, AAO x 3  Psych: normal affect    Laboratory Results:    Lab Results   Component Value Date    BUN 45 (H) 12/30/2021     12/30/2021    K 3.6 12/30/2021     12/30/2021    CO2 21 12/30/2021       Lab Results   Component Value Date    BUN 45 (H) 12/30/2021    BUN 31 (H) 12/21/2021    BUN 30 (H) 12/20/2021    BUN 27 (H) 12/19/2021    BUN 24 (H) 12/17/2021    K 3.6 12/30/2021    K 3.7 12/21/2021    K 3.8 12/20/2021    K 3.9 12/19/2021    K 3.6 12/17/2021       Lab Results   Component Value Date    WBC 6.9 12/30/2021    RBC 2.65 (L) 12/30/2021    HGB 8.6 (L) 12/30/2021    HCT 27.2 (L) 12/30/2021    .6 (H) 12/30/2021    MCH 32.5 12/30/2021    RDW 14.3 12/30/2021     12/30/2021       Lab Results   Component Value Date    PHOS 4.2 12/10/2021       Urine dipstick:   Lab Results   Component Value Date/Time    Color YELLOW/STRAW 10/29/2021 07:16 PM    Appearance CLOUDY (A) 10/29/2021 07:16 PM    Specific gravity 1.009 10/29/2021 07:16 PM    pH (UA) 5.0 10/29/2021 07:16 PM    Protein 100 (A) 10/29/2021 07:16 PM    Glucose Negative 10/29/2021 07:16 PM    Ketone Negative 10/29/2021 07:16 PM    Bilirubin Negative 10/29/2021 07:16 PM    Urobilinogen 0.2 10/29/2021 07:16 PM    Nitrites Negative 10/29/2021 07:16 PM    Leukocyte Esterase MODERATE (A) 10/29/2021 07:16 PM    Epithelial cells MODERATE (A) 10/29/2021 07:16 PM    Bacteria 2+ (A) 10/29/2021 07:16 PM    WBC 20-50 10/29/2021 07:16 PM    RBC 0-5 10/29/2021 07:16 PM       I have reviewed the following: All pertinent labs, microbiology data, radiology imaging for my assessment         Thank you for allowing us to participate in the care of this patient. We will follow patient. Please dont hesitate to call with any questions    Aggie Rai MD  12/30/2021    [address], [place]  Phone - [phone]   Fax - [phone]  [email or web address]

## 2021-12-30 NOTE — H&P
History & Physical    Primary Care Provider: Christina Block DO  Source of Information: Patient     History of Presenting Illness:   Hollie Barnard is a [de-identified] y.o. female who presents with sob     MsRenny New is an 42-year-old female with H/o afib, CAD, CKD 3, and CVA who presents to the ER with complaints of shortness of breath. Per EMS report, her symptoms started several hours prior to arrival. Per their report, she is on 2 L of home oxygen. Her oxygen saturations on her home 2 L were 81%. . She was placed on a nonrebreather with oxygen saturations in the mid 90s. She had reported some chest pain earlier. . She denies cough, however, she is coughing during the exam. She is not sure if she has been vaccinated for COVID-19,  She denies any leg swelling. She denies any other complaints. She was given bumex 1mg iv one dose in ER. Also gave one dose of IV levaquin and zosyn in ER  Currently pt Feels improved.         Review of Systems:  General: hpi, no changes of weight  HEENT: no headache, no vision changes, no nose discharge, no hearing changes   RES: HPI   CVS: HPi   Muscular: no joint swelling, no muscle pain, chronic leg skin stasis changes, no significant swelling recently   Skin: no rash, no itching   GI: no vomiting, no diarrhea  : no dysuria, no hematuria  Hemo: no gum bleeding, no petechial   Neuro: no sensation changes, no focal weakness   Endo: no polydipsia   Psych: denied depression     Past Medical History:   Diagnosis Date    Anxiety disorder     Atrial fibrillation ([address]     CAD (coronary artery disease) 2007    stents, CABG x 3v    Carotid stenosis     Cervical stenosis of spinal canal 07/2019    Chronic kidney disease     Cough     CVA (cerebral vascular accident) ([address] 07/2019    left lacunar infarct at head of caudate    Depression     AND CHRONIC ANXIETY    Diabetes (HCC)     GERD (gastroesophageal reflux disease)     High cholesterol     History of peptic ulcer     Bleeding ulcer with increased NSAID use    Hypertension     Left carotid stenosis 07/2019    s/p left CEA with Dr. Ofelia Mejia, old 2007    PUD (peptic ulcer disease)     Stroke ([facility]     Tremor     Valvular heart disease       Past Surgical History:   Procedure Laterality Date    COLONOSCOPY N/A 12/17/2021    COLONOSCOPY   :- performed by Janiya Melton MD at [address] HX CAROTID ENDARTERECTOMY  07/2019    HX CORONARY ARTERY BYPASS GRAFT      HX TONSILLECTOMY  1963    [place] CARDIAC SURG PROCEDURE UNLIST      CABG X3 VESSEL    [place] TOTAL KNEE ARTHROPLASTY Right 2015     Prior to Admission medications    Medication Sig Start Date End Date Taking? Authorizing Provider   polyethylene glycol (MIRALAX) 17 gram packet Take 1 Packet by mouth daily as needed for Constipation for up to 30 days. 12/21/21 1/20/22  Omi Juares MD   ondansetron (ZOFRAN ODT) 4 mg disintegrating tablet Take 1 Tablet by mouth every eight (8) hours as needed for Nausea or Vomiting. 12/21/21   Omi Juares MD   furosemide (LASIX) 40 mg tablet Take 1 Tablet by mouth daily. 12/21/21   Omi Juares MD   ARIPiprazole (ABILIFY) 20 mg tablet Take 20 mg by mouth daily. 8/18/21   Provider, Historical   cilostazoL (PLETAL) 100 mg tablet Take  by mouth Before breakfast and dinner. Provider, Historical   apixaban (ELIQUIS) 2.5 mg tablet Take 1 Tablet by mouth two (2) times a day. 8/26/21   Duke Denise MD   amLODIPine (NORVASC) 2.5 mg tablet Take 1 Tablet by mouth daily. 7/16/21   Duke Denise MD   hydrALAZINE (APRESOLINE) 50 mg tablet Take 1 Tablet by mouth three (3) times daily. 7/16/21   Duke Denise MD   atorvastatin (LIPITOR) 40 mg tablet Take 1 Tablet by mouth nightly. 7/16/21   Duke Denise MD   carvediloL (COREG) 3.125 mg tablet Take 1 Tablet by mouth two (2) times daily (with meals).  7/16/21   Duke Denise MD   busPIRone (BUSPAR) 5 mg tablet Take 1 Tab by mouth three (3) times daily. 4/29/21   Tom Escobar MD   gabapentin (NEURONTIN) 100 mg capsule Take 100 mg by mouth nightly. Provider, Historical   melatonin 3 mg tablet Take 6 mg by mouth nightly. Provider, Historical   potassium chloride SR (KLOR-CON 10) 10 mEq tablet Take 20 mEq by mouth daily. Provider, Historical   nitroglycerin (Nitrostat) 0.4 mg SL tablet 0.4 mg by SubLINGual route every five (5) minutes as needed for Chest Pain. Up to 3 doses. Provider, Historical   clopidogreL (Plavix) 75 mg tab Take 75 mg by mouth daily (after breakfast). Patient not taking: Reported on 8/26/2021    Provider, Historical   carbidopa-levodopa (SINEMET)  mg per tablet Take 2 Tabs by mouth four (4) times daily. 2/4/20   Anny Gutierrez MD   acetaminophen (TYLENOL) 325 mg tablet Take 650 mg by mouth every six (6) hours as needed for Pain or Fever. Provider, Historical   cyanocobalamin (VITAMIN B12) 100 mcg tablet Take 100 mcg by mouth daily. Provider, Historical   vit C,O-Hv-jsqbl-lutein-zeaxan (PRESERVISION AREDS-2) 119-751-02-1 mg-unit-mg-mg cap capsule Take 1 Cap by mouth two (2) times a day. Patient not taking: Reported on 7/16/2021    Provider, Historical   senna-docusate (SENNA WITH DOCUSATE SODIUM) 8.6-50 mg per tablet Take 1 Tab by mouth nightly. Patient not taking: Reported on 8/26/2021    Provider, Historical   Cholecalciferol, Vitamin D3, 1,000 unit cap Take 1,000 Units by mouth daily. Provider, Historical   pantoprazole (PROTONIX) 40 mg tablet Take 40 mg by mouth Daily (before breakfast). Indications: h/o bleeding ulcer with nsaid    Provider, Historical   DULoxetine (CYMBALTA) 60 mg capsule Take 120 mg by mouth daily. Indications: anxiousness associated with depression    Provider, Historical   multivitamins-minerals-lutein (CENTRUM SILVER) tab tablet Take 1 Tablet by mouth daily.     Provider, Historical     No Known Allergies   Family History   Problem Relation Age of Onset    Heart Attack Mother 72        Dec 81yo    Hypertension Mother     Other Father         Unknown    Parkinson's Disease Brother     Anesth Problems Neg Hx         SOCIAL HISTORY:  Patient resides:  Independently x   Assisted Living    SNF    With family care       Smoking history:   None x   Former    Chronic      Alcohol history:   None x   Social    Chronic      Ambulates:   Independently    w/cane    w/walker x   w/wc    CODE STATUS:  DNR    Full x   Other      Objective:     Physical Exam:     Visit Vitals  BP (!) 145/73   Pulse 71   Temp 98.6 °F (37 °C)   Resp 18   Ht 5' 5\" (1.651 m)   Wt 79.2 kg (174 lb 9.7 oz)   SpO2 94%   BMI 29.06 kg/m²    O2 Flow Rate (L/min): 5 l/min O2 Device: Nasal cannula    General:  Alert, cooperative, no distress, appears stated age. Head:  Normocephalic, without obvious abnormality, atraumatic. Eyes:  Conjunctivae/corneas clear. PERRL, EOMs intact. Nose: On NC    Throat: No erythema    Neck: Supple, symmetrical, trachea midline, no adenopathy, thyroid: no enlargement/tenderness/nodules, no carotid bruit and no JVD. Back:   Symmetric, no curvature. ROM normal. No CVA tenderness. Lungs:   Basilar crackles, no wheezing,    Chest wall:  No tenderness or deformity. Heart:  Regular rate and rhythm, S1, S2 normal, no murmur, click, rub or gallop. Abdomen:   Soft, non-tender. Bowel sounds normal. No masses,  No organomegaly. Extremities: Extremities normal, atraumatic, chronic leg stasis skin changes    Pulses: 2+ and symmetric all extremities. Skin: Skin dry    Neurologic: CNII-XII intact. None focal              Data Review:     Recent Days:  Recent Labs     12/30/21  0845   WBC 6.9   HGB 8.6*   HCT 27.2*        Recent Labs     12/30/21  0845      K 3.6      CO2 21   *   BUN 45*   CREA 2.31*   CA 8.5   ALB 3.3*   ALT 7*     No results for input(s): PH, PCO2, PO2, HCO3, FIO2 in the last 72 hours.     24 Hour Results:  Recent Results (from the past 24 hour(s))   EKG, 12 LEAD, INITIAL    Collection Time: 12/30/21  8:37 AM   Result Value Ref Range    Ventricular Rate 127 BPM    Atrial Rate 127 BPM    P-R Interval 170 ms    QRS Duration 138 ms    Q-T Interval 320 ms    QTC Calculation (Bezet) 465 ms    Calculated P Axis 82 degrees    Calculated R Axis 67 degrees    Calculated T Axis -111 degrees    Diagnosis       Undetermined rhythm  Probable Atrial fibrillation  Left bundle branch block  Confirmed by Candido Guzman M.D., [employer] ([id number]) on 12/30/2021 9:13:05 AM     CBC WITH AUTOMATED DIFF    Collection Time: 12/30/21  8:45 AM   Result Value Ref Range    WBC 6.9 3.6 - 11.0 K/uL    RBC 2.65 (L) 3.80 - 5.20 M/uL    HGB 8.6 (L) 11.5 - 16.0 g/dL    HCT 27.2 (L) 35.0 - 47.0 %    .6 (H) 80.0 - 99.0 FL    MCH 32.5 26.0 - 34.0 PG    MCHC 31.6 30.0 - 36.5 g/dL    RDW 14.3 11.5 - 14.5 %    PLATELET 554 607 - 847 K/uL    MPV 9.0 8.9 - 12.9 FL    NRBC 0.0 0  WBC    ABSOLUTE NRBC 0.00 0.00 - 0.01 K/uL    NEUTROPHILS 87 (H) 32 - 75 %    LYMPHOCYTES 5 (L) 12 - 49 %    MONOCYTES 5 5 - 13 %    EOSINOPHILS 2 0 - 7 %    BASOPHILS 0 0 - 1 %    IMMATURE GRANULOCYTES 1 (H) 0.0 - 0.5 %    ABS. NEUTROPHILS 6.1 1.8 - 8.0 K/UL    ABS. LYMPHOCYTES 0.3 (L) 0.8 - 3.5 K/UL    ABS. MONOCYTES 0.3 0.0 - 1.0 K/UL    ABS. EOSINOPHILS 0.1 0.0 - 0.4 K/UL    ABS. BASOPHILS 0.0 0.0 - 0.1 K/UL    ABS. IMM.  GRANS. 0.1 (H) 0.00 - 0.04 K/UL    DF SMEAR SCANNED      RBC COMMENTS MACROCYTOSIS  1+       METABOLIC PANEL, COMPREHENSIVE    Collection Time: 12/30/21  8:45 AM   Result Value Ref Range    Sodium 137 136 - 145 mmol/L    Potassium 3.6 3.5 - 5.1 mmol/L    Chloride 105 97 - 108 mmol/L    CO2 21 21 - 32 mmol/L    Anion gap 11 5 - 15 mmol/L    Glucose 242 (H) 65 - 100 mg/dL    BUN 45 (H) 6 - 20 MG/DL    Creatinine 2.31 (H) 0.55 - 1.02 MG/DL    BUN/Creatinine ratio 19 12 - 20      GFR est AA 25 (L) >60 ml/min/1.73m2    GFR est non-AA 20 (L) >60 ml/min/1.73m2    Calcium 8.5 8.5 - 10.1 MG/DL Bilirubin, total 0.4 0.2 - 1.0 MG/DL    ALT (SGPT) 7 (L) 12 - 78 U/L    AST (SGOT) 13 (L) 15 - 37 U/L    Alk. phosphatase 95 45 - 117 U/L    Protein, total 6.4 6.4 - 8.2 g/dL    Albumin 3.3 (L) 3.5 - 5.0 g/dL    Globulin 3.1 2.0 - 4.0 g/dL    A-G Ratio 1.1 1.1 - 2.2     SAMPLES BEING HELD    Collection Time: 12/30/21  8:45 AM   Result Value Ref Range    SAMPLES BEING HELD 1 EXTRA RED TUBE  EXTRA BLUE TOP TUBES     COMMENT        Add-on orders for these samples will be processed based on acceptable specimen integrity and analyte stability, which may vary by analyte. LACTIC ACID    Collection Time: 12/30/21  8:45 AM   Result Value Ref Range    Lactic acid 2.6 (HH) 0.4 - 2.0 MMOL/L   TROPONIN-HIGH SENSITIVITY    Collection Time: 12/30/21  8:45 AM   Result Value Ref Range    Troponin-High Sensitivity 237 (HH) 0 - 51 ng/L   COVID-19 RAPID TEST    Collection Time: 12/30/21  8:45 AM   Result Value Ref Range    Specimen source Nasopharyngeal      COVID-19 rapid test Not detected NOTD     NT-PRO BNP    Collection Time: 12/30/21  8:45 AM   Result Value Ref Range    NT pro-BNP 17,648 (H) <450 PG/ML   EKG, 12 LEAD, SUBSEQUENT    Collection Time: 12/30/21  9:13 AM   Result Value Ref Range    Ventricular Rate 67 BPM    Atrial Rate 67 BPM    P-R Interval 170 ms    QRS Duration 106 ms    Q-T Interval 434 ms    QTC Calculation (Bezet) 458 ms    Calculated P Axis 58 degrees    Calculated R Axis 6 degrees    Calculated T Axis -172 degrees    Diagnosis       Sinus rhythm with occasional premature ventricular complexes  Left ventricular hypertrophy with repolarization abnormality ( [employer]   product )  Cannot rule out Septal infarct (cited on or before 30-DEC-2021)  When compared with ECG of 30-DEC-2021 08:37,  Sinus rhythm has replaced Atrial fibrillation  Vent.  rate has decreased BY  60 BPM    Serial changes of evolving Septal infarct present  Confirmed by Alexsander Kraus ([id number]) on 12/30/2021 10:37:47 AM           Imaging:   XR CHEST PORT    Result Date: 12/30/2021  1. Minimally improved pulmonary edema with persistent basilar consolidation and small pleural effusions       Assessment:     Active Problems:    CHF exacerbation ([address] (9/25/2020)           Plan:     1. Acute on chronic respiratory failure with hypoxia: likely due to CHF exacerbation. Unlikely PNA. Continue iv diuresis. Received abx in ER. Will check procalcitonin, if high may continue abx.   2. Acute on chronic diastolic congestive heart failure: iv bumex 1mg bid. No ACE/ARB due to MORENO and EF >40. Continue coreg. Cardiologist consult. 3. MORENO on CKD 3:  Will continue iv diuresis due to her fluid overload. Will consult nephrologist.   Check renal US. 4. CAD/NSTEMI vs demanding ischemia: continue asa plavix and stain, trending CE, card consult   5. Afib: continue AC and coreg.  Will monitor        Signed By: Flip Vance MD     December 30, 2021

## 2021-12-30 NOTE — ED TRIAGE NOTES
Pt arrives via EMS from [facility] for chest pain and trouble breathing. Pt on nonbreather on 10 L.  Afib with hx of afib

## 2021-12-31 ENCOUNTER — APPOINTMENT (OUTPATIENT)
Dept: NON INVASIVE DIAGNOSTICS | Age: 81
DRG: 291 | End: 2021-12-31
Attending: INTERNAL MEDICINE
Payer: MEDICARE

## 2021-12-31 LAB
ALBUMIN SERPL-MCNC: 3.4 G/DL (ref 3.5–5)
ALBUMIN/GLOB SERPL: 1.1 {RATIO} (ref 1.1–2.2)
ALP SERPL-CCNC: 87 U/L (ref 45–117)
ALT SERPL-CCNC: 9 U/L (ref 12–78)
ANION GAP SERPL CALC-SCNC: 10 MMOL/L (ref 5–15)
AST SERPL-CCNC: 14 U/L (ref 15–37)
BASOPHILS # BLD: 0 K/UL (ref 0–0.1)
BASOPHILS NFR BLD: 0 % (ref 0–1)
BILIRUB SERPL-MCNC: 0.8 MG/DL (ref 0.2–1)
BUN SERPL-MCNC: 44 MG/DL (ref 6–20)
BUN/CREAT SERPL: 19 (ref 12–20)
CALCIUM SERPL-MCNC: 9.2 MG/DL (ref 8.5–10.1)
CHLORIDE SERPL-SCNC: 102 MMOL/L (ref 97–108)
CO2 SERPL-SCNC: 21 MMOL/L (ref 21–32)
CREAT SERPL-MCNC: 2.27 MG/DL (ref 0.55–1.02)
DIFFERENTIAL METHOD BLD: ABNORMAL
ECHO AO ROOT DIAM: 2.8 CM
ECHO AO ROOT INDEX: 1.54 CM/M2
ECHO AR MAX VEL PISA: 4.2 M/S
ECHO AV AREA PEAK VELOCITY: 1.8 CM2
ECHO AV AREA VTI: 1.8 CM2
ECHO AV AREA VTI: 1.8 CM2
ECHO AV MEAN GRADIENT: 14 MMHG
ECHO AV MEAN VELOCITY: 1.7 M/S
ECHO AV PEAK GRADIENT: 26 MMHG
ECHO AV PEAK GRADIENT: 29 MMHG
ECHO AV PEAK VELOCITY: 2.6 M/S
ECHO AV PEAK VELOCITY: 2.7 M/S
ECHO AV REGURGITANT PHT: 425 MILLISECOND
ECHO AV VTI: 51.9 CM
ECHO EST RA PRESSURE: 8 MMHG
ECHO LA DIAMETER INDEX: 2.03 CM/M2
ECHO LA DIAMETER: 3.7 CM
ECHO LA TO AORTIC ROOT RATIO: 1.32
ECHO LA VOL 4C: 108 ML (ref 22–52)
ECHO LA VOLUME INDEX A4C: 59 ML/M2 (ref 16–34)
ECHO LV E' LATERAL VELOCITY: 14 CM/S
ECHO LV E' SEPTAL VELOCITY: 6 CM/S
ECHO LV FRACTIONAL SHORTENING: 42 % (ref 28–44)
ECHO LV INTERNAL DIMENSION DIASTOLE INDEX: 2.47 CM/M2
ECHO LV INTERNAL DIMENSION DIASTOLIC: 4.5 CM (ref 3.9–5.3)
ECHO LV INTERNAL DIMENSION SYSTOLIC INDEX: 1.43 CM/M2
ECHO LV INTERNAL DIMENSION SYSTOLIC: 2.6 CM
ECHO LV IVSD: 1 CM (ref 0.6–0.9)
ECHO LV MASS 2D: 132.8 G (ref 67–162)
ECHO LV MASS INDEX 2D: 73 G/M2 (ref 43–95)
ECHO LV POSTERIOR WALL DIASTOLIC: 0.8 CM (ref 0.6–0.9)
ECHO LV RELATIVE WALL THICKNESS RATIO: 0.36
ECHO LVOT AREA: 3.1 CM2
ECHO LVOT AV VTI INDEX: 0.55
ECHO LVOT DIAM: 2 CM
ECHO LVOT MEAN GRADIENT: 5 MMHG
ECHO LVOT PEAK GRADIENT: 9 MMHG
ECHO LVOT PEAK VELOCITY: 1.5 M/S
ECHO LVOT STROKE VOLUME INDEX: 49.3 ML/M2
ECHO LVOT SV: 89.8 ML
ECHO LVOT VTI: 28.6 CM
ECHO MV A VELOCITY: 1.05 M/S
ECHO MV AREA PHT: 3.8 CM2
ECHO MV E DECELERATION TIME (DT): 201.9 MS
ECHO MV E VELOCITY: 1.48 M/S
ECHO MV E/A RATIO: 1.41
ECHO MV E/E' LATERAL: 10.57
ECHO MV E/E' RATIO (AVERAGED): 17.62
ECHO MV E/E' SEPTAL: 24.67
ECHO MV PRESSURE HALF TIME (PHT): 58.5 MS
ECHO MV REGURGITANT PEAK GRADIENT: 169 MMHG
ECHO MV REGURGITANT PEAK VELOCITY: 6.5 M/S
ECHO PULMONARY ARTERY SYSTOLIC PRESSURE (PASP): 45 MMHG
ECHO PV MAX VELOCITY: 1.1 M/S
ECHO PV PEAK GRADIENT: 5 MMHG
ECHO RIGHT VENTRICULAR SYSTOLIC PRESSURE (RVSP): 89 MMHG
ECHO RV FREE WALL PEAK S': 9 CM/S
ECHO RV TAPSE: 1.8 CM (ref 1.5–2)
ECHO TV REGURGITANT MAX VELOCITY: 4.5 M/S
ECHO TV REGURGITANT PEAK GRADIENT: 81 MMHG
EOSINOPHIL # BLD: 0.1 K/UL (ref 0–0.4)
EOSINOPHIL #/AREA URNS HPF: NEGATIVE /[HPF]
EOSINOPHIL NFR BLD: 1 % (ref 0–7)
ERYTHROCYTE [DISTWIDTH] IN BLOOD BY AUTOMATED COUNT: 14 % (ref 11.5–14.5)
GLOBULIN SER CALC-MCNC: 3.2 G/DL (ref 2–4)
GLUCOSE SERPL-MCNC: 187 MG/DL (ref 65–100)
HCT VFR BLD AUTO: 23.8 % (ref 35–47)
HGB BLD-MCNC: 7.7 G/DL (ref 11.5–16)
IMM GRANULOCYTES # BLD AUTO: 0 K/UL (ref 0–0.04)
IMM GRANULOCYTES NFR BLD AUTO: 0 % (ref 0–0.5)
LYMPHOCYTES # BLD: 0.3 K/UL (ref 0.8–3.5)
LYMPHOCYTES NFR BLD: 3 % (ref 12–49)
MAGNESIUM SERPL-MCNC: 2 MG/DL (ref 1.6–2.4)
MCH RBC QN AUTO: 32.8 PG (ref 26–34)
MCHC RBC AUTO-ENTMCNC: 32.4 G/DL (ref 30–36.5)
MCV RBC AUTO: 101.3 FL (ref 80–99)
MONOCYTES # BLD: 0.9 K/UL (ref 0–1)
MONOCYTES NFR BLD: 8 % (ref 5–13)
NEUTS SEG # BLD: 9.6 K/UL (ref 1.8–8)
NEUTS SEG NFR BLD: 88 % (ref 32–75)
NRBC # BLD: 0 K/UL (ref 0–0.01)
NRBC BLD-RTO: 0 PER 100 WBC
PHOSPHATE SERPL-MCNC: 3.7 MG/DL (ref 2.6–4.7)
PLATELET # BLD AUTO: 302 K/UL (ref 150–400)
PMV BLD AUTO: 9.1 FL (ref 8.9–12.9)
POTASSIUM SERPL-SCNC: 3.4 MMOL/L (ref 3.5–5.1)
PROT SERPL-MCNC: 6.6 G/DL (ref 6.4–8.2)
RBC # BLD AUTO: 2.35 M/UL (ref 3.8–5.2)
RBC MORPH BLD: ABNORMAL
SODIUM SERPL-SCNC: 133 MMOL/L (ref 136–145)
TROPONIN-HIGH SENSITIVITY: 412 NG/L (ref 0–51)
WBC # BLD AUTO: 10.9 K/UL (ref 3.6–11)

## 2021-12-31 PROCEDURE — 93306 TTE W/DOPPLER COMPLETE: CPT

## 2021-12-31 PROCEDURE — 74011000250 HC RX REV CODE- 250

## 2021-12-31 PROCEDURE — 83735 ASSAY OF MAGNESIUM: CPT

## 2021-12-31 PROCEDURE — 84484 ASSAY OF TROPONIN QUANT: CPT

## 2021-12-31 PROCEDURE — 85025 COMPLETE CBC W/AUTO DIFF WBC: CPT

## 2021-12-31 PROCEDURE — 74011250637 HC RX REV CODE- 250/637: Performed by: NURSE PRACTITIONER

## 2021-12-31 PROCEDURE — 84100 ASSAY OF PHOSPHORUS: CPT

## 2021-12-31 PROCEDURE — 93306 TTE W/DOPPLER COMPLETE: CPT | Performed by: INTERNAL MEDICINE

## 2021-12-31 PROCEDURE — 65660000000 HC RM CCU STEPDOWN

## 2021-12-31 PROCEDURE — 36415 COLL VENOUS BLD VENIPUNCTURE: CPT

## 2021-12-31 PROCEDURE — 80053 COMPREHEN METABOLIC PANEL: CPT

## 2021-12-31 PROCEDURE — 74011000250 HC RX REV CODE- 250: Performed by: HOSPITALIST

## 2021-12-31 PROCEDURE — 51798 US URINE CAPACITY MEASURE: CPT

## 2021-12-31 PROCEDURE — 74011250637 HC RX REV CODE- 250/637: Performed by: HOSPITALIST

## 2021-12-31 PROCEDURE — 99222 1ST HOSP IP/OBS MODERATE 55: CPT | Performed by: INTERNAL MEDICINE

## 2021-12-31 PROCEDURE — 74011250636 HC RX REV CODE- 250/636: Performed by: NURSE PRACTITIONER

## 2021-12-31 RX ORDER — MORPHINE SULFATE 2 MG/ML
1 INJECTION, SOLUTION INTRAMUSCULAR; INTRAVENOUS ONCE
Status: COMPLETED | OUTPATIENT
Start: 2021-12-31 | End: 2021-12-31

## 2021-12-31 RX ADMIN — ALBUTEROL SULFATE 5 MG: 2.5 SOLUTION RESPIRATORY (INHALATION) at 00:00

## 2021-12-31 RX ADMIN — DILTIAZEM HYDROCHLORIDE 120 MG: 120 CAPSULE, COATED, EXTENDED RELEASE ORAL at 10:35

## 2021-12-31 RX ADMIN — CILOSTAZOL 100 MG: 50 TABLET ORAL at 18:37

## 2021-12-31 RX ADMIN — PANTOPRAZOLE SODIUM 40 MG: 40 TABLET, DELAYED RELEASE ORAL at 07:00

## 2021-12-31 RX ADMIN — HYDRALAZINE HYDROCHLORIDE 50 MG: 50 TABLET, FILM COATED ORAL at 10:35

## 2021-12-31 RX ADMIN — VITAM B12 100 MCG: 100 TAB at 10:35

## 2021-12-31 RX ADMIN — CARVEDILOL 3.12 MG: 3.12 TABLET, FILM COATED ORAL at 10:35

## 2021-12-31 RX ADMIN — APIXABAN 2.5 MG: 2.5 TABLET, FILM COATED ORAL at 10:36

## 2021-12-31 RX ADMIN — CARBIDOPA AND LEVODOPA 2 TABLET: 25; 100 TABLET ORAL at 10:35

## 2021-12-31 RX ADMIN — CARBIDOPA AND LEVODOPA 2 TABLET: 25; 100 TABLET ORAL at 18:38

## 2021-12-31 RX ADMIN — CARVEDILOL 3.12 MG: 3.12 TABLET, FILM COATED ORAL at 18:38

## 2021-12-31 RX ADMIN — IPRATROPIUM BROMIDE AND ALBUTEROL SULFATE 3 ML: .5; 2.5 SOLUTION RESPIRATORY (INHALATION) at 00:00

## 2021-12-31 RX ADMIN — BUMETANIDE 1 MG: 0.25 INJECTION INTRAMUSCULAR; INTRAVENOUS at 10:36

## 2021-12-31 RX ADMIN — APIXABAN 2.5 MG: 2.5 TABLET, FILM COATED ORAL at 18:38

## 2021-12-31 RX ADMIN — BUSPIRONE HYDROCHLORIDE 5 MG: 5 TABLET ORAL at 21:13

## 2021-12-31 RX ADMIN — BUSPIRONE HYDROCHLORIDE 5 MG: 5 TABLET ORAL at 18:38

## 2021-12-31 RX ADMIN — DULOXETINE HYDROCHLORIDE 120 MG: 60 CAPSULE, DELAYED RELEASE ORAL at 10:36

## 2021-12-31 RX ADMIN — GABAPENTIN 100 MG: 100 CAPSULE ORAL at 21:12

## 2021-12-31 RX ADMIN — CILOSTAZOL 100 MG: 50 TABLET ORAL at 10:40

## 2021-12-31 RX ADMIN — Medication 1000 UNITS: at 10:36

## 2021-12-31 RX ADMIN — DOCUSATE SODIUM 50 MG AND SENNOSIDES 8.6 MG 1 TABLET: 8.6; 5 TABLET, FILM COATED ORAL at 21:13

## 2021-12-31 RX ADMIN — Medication 6 MG: at 21:12

## 2021-12-31 RX ADMIN — CARBIDOPA AND LEVODOPA 2 TABLET: 25; 100 TABLET ORAL at 21:13

## 2021-12-31 RX ADMIN — ARIPIPRAZOLE 20 MG: 10 TABLET ORAL at 10:59

## 2021-12-31 RX ADMIN — MORPHINE SULFATE 1 MG: 2 INJECTION, SOLUTION INTRAMUSCULAR; INTRAVENOUS at 01:03

## 2021-12-31 RX ADMIN — CLOPIDOGREL BISULFATE 75 MG: 75 TABLET ORAL at 10:36

## 2021-12-31 RX ADMIN — BUSPIRONE HYDROCHLORIDE 5 MG: 5 TABLET ORAL at 10:36

## 2021-12-31 RX ADMIN — BUMETANIDE 1 MG: 0.25 INJECTION INTRAMUSCULAR; INTRAVENOUS at 18:37

## 2021-12-31 RX ADMIN — HYDRALAZINE HYDROCHLORIDE 50 MG: 50 TABLET, FILM COATED ORAL at 18:38

## 2021-12-31 RX ADMIN — ATORVASTATIN CALCIUM 40 MG: 40 TABLET, FILM COATED ORAL at 21:13

## 2021-12-31 NOTE — NURSE NAVIGATOR
Chart reviewed by Heart Failure Nurse Navigator. Heart Failure database completed. EF:  pending     ACEi/ARB/ARNi: contraindicated CKD    BB: Coreg    Aldosterone Antagonist: echo pending    Obstructive Sleep Apnea Screening:N/4   STOP-BANG score:   Referred to Sleep Medicine:     CRT Not indicated     NYHA Functional Class requested via provider message on [employer]      Heart Failure Teach Back in Patient Education. Heart Failure Avoiding Triggers on Discharge Instructions. Cardiologist: JO-ANN      Post discharge follow up phone call to be made within 48-72 hours of discharge.

## 2021-12-31 NOTE — PROGRESS NOTES
CARDIOLOGY CONSULT NOTE     [facility] [address], [address] [facility] [address], [place]   [phone] fax [phone]    Name: Norma Gonzalez  1940 [id number]  12/31/2021 9:19 AM     Assessment/Plan:      1. Acute on chronic respiratory failure with hypoxia: possibly due to pulmonary condition versus diastolic dysfunction  -antibiotics per IM  -continue diuresis per nephrology, proBNP 02,782 on admission but weight down 10 lbs today  -CR with improved pulmonary edema, persistant basilar consolidation, small pleural effusions   2. Acute on chronic diastolic congestive heart failure:  EF 50-55% by TTE 8/21  -will repeat TTE to reassess cardiac function  -continue diuresis per nephrology  -no ACEi/ARB due to CKD and EF >40%  -continue to optimize BP regimen  3. HTN:  Elevated on admission but controlled now   -continue coreg 3.125mg BID (dose limited by bradycardia), continue hydralazine   4. MORENO on CKD 3:  creatinine 2.2 today, management per nephrology  5. CAD s/p CABG in the past  -on plavix, statin, coreg  -troponin slightly elevated but likely related to CKD   6. Atrial Fib  -currently in NSR  -continue coreg and diltiazem for rate control  -continue eliquis 2.5mg BID for anticoagulation, continue to monitor severity of anemia  7. Anemia  -Hgb 7.7 today, management per IM   -denies blood in stool   8. Hypokalemia  -K 3.4, repletion per nephrology   9. Dyslipidemia  -on atorvastatin, last LDL 83 in 9/20, will need lipids rechecked as OP    Saw and evaluated pt and agree with above assessment and plan. Concern for CHF but pt has had recent PNA and appears pre-renal (no LE edema, elevated Cr). Previously preserved LV systolic function. Echo pending and will review. Trial IV diuresis per nephrology. BP is much better. With regard to afib, stable in sinus rhythm. Will need close outpt follow up with Dr. Zac Castillo. Dr. Melania Roberson to follow over weekend.   Alphonso Justin, MD        Hx of   Parkinsons  Hx of CVA  PAD w/ L CEA  AAA    ECHO ADULT COMPLETE 08/26/2021     Interpretation Summary  · LV: Estimated LVEF is 50 - 55%. Visually measured ejection fraction. Normal cavity size. Mild concentric hypertrophy. Low normal systolic function. Abnormal left ventricular septal motion consistent with left bundle branch block. Moderate (grade 2) left ventricular diastolic dysfunction. · MV: Mitral valve leaflet calcification. Moderate mitral annular calcification. Mild to moderate mitral valve regurgitation is present. · LA: Moderately dilated left atrium. Left Atrium volume index is 49 mL/m2. · TV: Mild to moderate tricuspid valve regurgitation is present. Right Ventricular Arterial Pressure (RVSP) is 45 mmHg. Pulmonary hypertension found to be mild. · PA: Mild pulmonary hypertension. Pulmonary arterial systolic pressure is 45 mmHg. · AV: Mild to moderate aortic valve regurgitation is present. · RV: Not well visualized. Borderline low systolic function. · Image quality for this study was technically difficult. Signed by: Radha Dangelo MD on 8/26/2021 11:03 AM    Admit Date: 12/30/2021     Admit Diagnosis: CHF exacerbation [facility]) [I50.9]  Primary Care Physician:Susan Arce DO     Attending Provider: Satish Flowers DO  Primary Cardiologist: Dr. Kylie Lau Cardiologist: Dr. Bee Nevarez: possible CHF  Requesting Physician: Dr. Radha Naylor    Subjective:     Cathy Epperson is a [de-identified] y.o. female with extensive cardiovascular history as above admitted for dyspnea.     Last seen by Dr. Heaven Jeff in 8/21 - at that time she had LE edema but was eating high sodium foods, losartan had been stopped and bumex had been decreased for MORENO, echo showed EF 50-55%, EF had been 35% in the past due to ICM, he mentioned she was difficult to manage medically due to her living situation and lack of transportation    This admission she presented to ER with complaints of shortness of breath for several hours. On home oxygen at 2pm and O2 Sats were 81%. EMS placed her on an NRB prior to arrival.  She had c/o chest pain earlier in the day but denied chest pain on arrival.  She denied fevers or chills but had a cough. She was given an dose of IV antibiotics and IV diuretic in the ER with improvement. Today she denies any chest pain or palpitations. She reports improvement in her breathing. Denies PND. Denies dizziness or lightheadedness. She has some musculoskeletal jerking or twitching bilaterally which she says she thinks is due to a medication she is taking. This is new since her hospitalization per her report. She denies any LE edema. Review of Symptoms:  A comprehensive review of systems was negative except for that written in the HPI.     Previous treatment/evaluation includes Coronary Artery Bypass Graft and echocardiogram .    Past Medical History:   Diagnosis Date    Anxiety disorder     Atrial fibrillation (HCC)     CAD (coronary artery disease) 2007    stents, CABG x 3v    Carotid stenosis     Cervical stenosis of spinal canal 07/2019    Chronic kidney disease     Cough     CVA [facility]cerebral vascular accident) ([facility] 07/2019    left lacunar infarct at head of caudate    Depression     AND CHRONIC ANXIETY    Diabetes (HCC)     GERD (gastroesophageal reflux disease)     High cholesterol     History of peptic ulcer     Bleeding ulcer with increased NSAID use    Hypertension     Left carotid stenosis 07/2019    s/p left CEA with Dr. General Gomez, old 2007    PUD (peptic ulcer disease)     Stroke ([facility]     Tremor     Valvular heart disease      Past Surgical History:   Procedure Laterality Date    COLONOSCOPY N/A 12/17/2021    COLONOSCOPY   :- performed by Hawk Borrero [facility] at [facility] ENDOSCOPY    HX CAROTID ENDARTERECTOMY  07/2019    HX CORONARY ARTERY BYPASS GRAFT      HX TONSILLECTOMY  1963    AR CARDIAC SURG PROCEDURE UNLIST CABG X3 VESSEL    MT TOTAL KNEE ARTHROPLASTY Right 2015     Current Facility-Administered Medications   Medication Dose Route Frequency    apixaban (ELIQUIS) tablet 2.5 mg  2.5 mg Oral BID    ARIPiprazole (ABILIFY) tablet 20 mg  20 mg Oral DAILY    atorvastatin (LIPITOR) tablet 40 mg  40 mg Oral QHS    busPIRone (BUSPAR) tablet 5 mg  5 mg Oral TID    carbidopa-levodopa (SINEMET)  mg per tablet 2 Tablet  2 Tablet Oral QID    carvediloL (COREG) tablet 3.125 mg  3.125 mg Oral BID WITH MEALS    cholecalciferol (VITAMIN D3) (1000 Units /25 mcg) tablet 1,000 Units  1,000 Units Oral DAILY    cilostazoL (PLETAL) tablet 100 mg  100 mg Oral ACB&D    clopidogreL (PLAVIX) tablet 75 mg  75 mg Oral DAILY AFTER BREAKFAST    DULoxetine (CYMBALTA) capsule 120 mg  120 mg Oral DAILY    cyanocobalamin (VITAMIN B12) tablet 100 mcg  100 mcg Oral DAILY    gabapentin (NEURONTIN) capsule 100 mg  100 mg Oral QHS    melatonin tablet 6 mg  6 mg Oral QHS    pantoprazole (PROTONIX) tablet 40 mg  40 mg Oral ACB    nitroglycerin (NITROSTAT) tablet 0.4 mg  0.4 mg SubLINGual Q5MIN PRN    polyethylene glycol (MIRALAX) packet 17 g  17 g Oral DAILY PRN    senna-docusate (PERICOLACE) 8.6-50 mg per tablet 1 Tablet  1 Tablet Oral QHS    bumetanide (BUMEX) injection 1 mg  1 mg IntraVENous BID    dilTIAZem ER (CARDIZEM CD) capsule 120 mg  120 mg Oral DAILY    hydrALAZINE (APRESOLINE) tablet 50 mg  50 mg Oral TID       No Known Allergies   Family History   Problem Relation Age of Onset    Heart Attack Mother 72        Dec 81yo    Hypertension Mother     Other Father         Unknown    Parkinson's Disease Brother     Anesth Problems Neg Hx       Social History     Socioeconomic History    Marital status: SINGLE   Occupational History    Occupation: Retired realestate/teacher   Tobacco Use    Smoking status: Former Smoker     Packs/day: 0.25     Years: 5.00     Pack years: 1.25     Types: Cigarettes     Quit date: 1969 Years since quittin.0    Smokeless tobacco: Never Used   Substance and Sexual Activity    Alcohol use: Not Currently     Comment: Rare    Drug use: No   Social History Narrative    Lives in [place] alone          Objective:      Physical Exam  Vitals:    21 0149 21 0353 21 0415 21 0552   BP:   108/68    Pulse: 82 65 65 72   Resp:   17    Temp:   99 °F (37.2 °C)    SpO2:   99%    Weight:   164 lb 3.9 oz (74.5 kg)    Height:           General:  Alert, cooperative,appears stated age. Eyes:  Conjunctivae/corneas clear. Ears:  Normal external ear canals both ears. Nose: Nares normal.   Mouth/Throat: Moist mucous membranes. Neck: Supple, symmetrical, trachea midline, no carotid bruit and no JVD. Back:   Not assessed     Lungs:   Bilateral basilar rhonchi and some scattered wheezing    Heart:  Regular rate and rhythm, S1, S2 normal, 2/6 TREY    Abdomen:   Soft, non-tender. Bowel sounds normal. .   Extremities: Atraumatic, + venous stasis, trace LE edema        Skin: Skin color normal.    Lymph nodes: Not assessed   Neurologic: CNII-XII intact. No focal deficits      Telemetry: normal sinus rhythm  ECG: normal sinus rhythm, anteroseptal Q waves, one PVC    Data Review:     No results for input(s): CPK, TROIQ in the last 72 hours. No lab exists for component: CKQMB, CPKMB, BMPP  Recent Labs     21  0113 21  0845   * 137   K 3.4* 3.6    105   CO2    BUN 44* 45*   CREA 2.27* 2.31*   * 242*   PHOS 3.7  --    CA 9.2 8.5     Recent Labs     21  0113 21  0845   WBC 10.9 6.9   HGB 7.7* 8.6*   HCT 23.8* 27.2*    339     Recent Labs     21  0113 21  0845   AP 87 95     No results for input(s): CHOL, LDLC in the last 72 hours. No lab exists for component: TGL, HDLC,  HBA1C  No results for input(s): CRP, TSH, TSHEXT in the last 72 hours.     No lab exists for component: ESR  Thank you very much for this referral. I appreciate the opportunity to participate in this patient's care. I will follow along with above stated plan.     Ramsey Dwons NP  [facility] [address] [place] [address]  [facility], [address] [place] [address] [phone], [place], [place]

## 2021-12-31 NOTE — PROGRESS NOTES
Reason for Readmission:     Admitted from [facility] for chest pain and SOB    *Previously admitted on 12/10/21 and DC on 12/21/21 due to acute on chronic hypoxic respiratory failure secondary to pulmonary edema and CAP *         RUR Score/Risk Level:     Level one    PCP: First and Last name:  Dr. Hernandez Dears   Name of Practice: [facility]   Are you a current patient: Yes/No: Yes   Approximate date of last visit: November 2021   Can you participate in a virtual visit with your PCP: NA    Is a Care Conference indicated:  No      Did you attend your follow up appointment (s): If not, why not: No         Resources/supports as identified by patient/family:   Kalyan Goldman An [phone]       Top Challenges facing patient (as identified by patient/family and CM): Finances/Medication cost?     [address] [facility]  Support system or lack thereof? Niece and other family members     Living arrangements? Normally lives alone in an apt       Self-care/ADLs/Cognition? A&O x 4, was independent with ADLs and self-care prior to previous hospital admission        Current Advanced Directive/Advance Care Plan:  DNR, ACP on file           Plan for utilizing home health:   NA             Transition of Care Plan:    Based on readmission, the patient's previous Plan of Care   has been evaluated and/or modified. The current Transition of Care Plan is:         CM met with patient to inform of CM role and assess needs. Patient is in agreement with returning to [facility] at [facility]. CM attempted phone call to patient's niece, no answer. Return referral placed in [facility] to [facility]. CM to monitor for transitional care planning needs.      Alanna Mtz MS

## 2021-12-31 NOTE — PROGRESS NOTES
Rapid Response Documentation    Name: Horace Martinez  YOB: 1940  MRN: [id number]  Admission Date: 12/30/2021  8:16 AM      Date of service: 12/30/2021    Active Diagnoses:    Hospital Problems  Date Reviewed: 8/26/2021          Codes Class Noted POA    [facility] ICD-10-CM: I50.9  ICD-9-CM: 428.0  9/25/2020 Unknown              Chief Complaint:  2300p: Rapid response called by nursing staff on 3N for worsening shortness of breath. Patient endorses shortness of breath, denies chest pain. Clinical Presentation:  Patient is an 44-year-old female admitted today for shortness of breath, hypoxia and increasing oxygen requirement. She normally uses 2 L at home, was only saturating 81%. PMH significant for acute on chronic diastolic congestive heart failure, fluid overload thought to be contributing. Has received Bumex since arriving in the ED, has had only moderate urinary output    Physical Exam:   · Alert, oriented, appears mildly distressed  · Sitting upright in bed, arms rated at sides. Respirations 30 breaths/min with an expiratory wheezing throughout. Crackles right mid to base, left side diminished. Current SPO2 90% on 4 LNC  · Heart rate regular, EKG shows sinus rhythm 85 bpm  · Abdomen soft, nontender not distended    Patient Vitals for the past 12 hrs:   Temp Pulse Resp BP SpO2   12/30/21 2320     92 %   12/30/21 2310     (!) 88 %   12/30/21 2200 99.1 °F (37.3 °C) 73 19 (!) 163/84 95 %   12/30/21 2140 99.1 °F (37.3 °C) 70 24 (!) 177/80 96 %   12/30/21 2115  73 26  94 %   12/30/21 2100  76 28 (!) 166/74 95 %   12/30/21 1333  70 21 134/64 95 %   12/30/21 1318 98.6 °F (37 °C) 71 18 (!) 145/73 94 %   12/30/21 1248  76 22 (!) 140/71 99 %   12/30/21 1218  80 24 138/74 100 %   12/30/21 1148  94 24 (!) 150/87 96 %         Data Reviewed: All diagnostic labs and studies have been reviewed.     Assessment and Plan:    Acute on chronic hypoxic respiratory failure likely due to CHF/fluid overload  · DuoNeb now, will give additional 5 mg albuterol if needed for continued wheezing  · Bumex 1 mg now  · Low threshold for transfer to IMCU for BiPAP    Discussed with RT, discussed with primary nurse    UPDATE 0230a: Medically, appears more comfortable, states breathing is a bit easier. Now on 10 LNC, SPO2 99%. Respiratory rate 24, no wheezing. Minimal urine output however after Bumex 1 mg and morphine 1 mg. Will wean down nasal cannula, was at 4 LNC on admission.   Cardiology consult pending, nephrology consult appreciated          Thais Tim, MSN, RN, NP-C  [phone] or via Perfect Serve    12/30/2021

## 2021-12-31 NOTE — PROGRESS NOTES
[address] [facility] Adult  Hospitalist Group                                                                                          Hospitalist Progress Note  [address],   Answering service: [phone] from in house phone        Date of Service:  2021  NAME:  Britni Collado  :  1940  MRN:  [id number]      Admission Summary:   Britni Collado is a [de-identified] y.o. female who presents with sob   Ms. Brenda Lopez is an 52-year-old female with H/o afib, CAD, CKD 3, and CVA who presents to the ER with complaints of shortness of breath. Per EMS report, her symptoms started several hours prior to arrival. Per their report, she is on 2 L of home oxygen. Her oxygen saturations on her home 2 L were 81%. . She was placed on a nonrebreather with oxygen saturations in the mid 90s. She had reported some chest pain earlier. . She denies cough, however, she is coughing during the exam. She is not sure if she has been vaccinated for COVID-19,  She denies any leg swelling. She denies any other complaints. She was given bumex 1mg iv one dose in ER. Also gave one dose of IV levaquin and zosyn in ER  Currently pt Feels improved.        Interval history / Subjective: Follow-up heart failure exacerbation. Patient seen and examined earlier today. Overnight, patient with shortness of breath. This a.m., patient comfortable. Denies chest pain, shortness of breath. Complains of bilateral heel pain.        Assessment & Plan:     Acute on chronic respiratory failure with hypoxia:   Acute on chronic diastolic congestive heart failure:  -Suspected heart failure exacerbation  -Continue diuresis, strict I's and O's, daily weights  -Appreciate cardiology assistance  -Echo ordered and pending    MORENO on CKD stage III:  -Nephrology following, appreciate recommendations  -Renal ultrasound  -Consider Sue catheter if retaining fluid    CAD s/p CABG:  -Continue home statin, Plavix    Hypertension:  -Continue carvedilol, diltiazem, hydralazine    Atrial fibrillation:  -Continue Eliquis, diltiazem, carvedilol    Hypokalemia: Replete    Anemia, microcytic: monitor closely       Code status: DNR  DVT prophylaxis: eliquis    Care Plan discussed with: Patient/Family  Anticipated Disposition: Home w/Family  Anticipated Discharge: Greater than 48 hours     Hospital Problems  Date Reviewed: 8/26/2021          Codes Class Noted POA    CHF exacerbation ([facility] ICD-10-CM: I50.9  ICD-9-CM: 428.0  9/25/2020 Unknown                Review of Systems:   Negative unless stated above      Vital Signs:    Last 24hrs VS reviewed since prior progress note. Most recent are:  Visit Vitals  BP (!) 126/54 (BP 1 Location: Right upper arm, BP Patient Position: At rest)   Pulse 64   Temp 98 °F (36.7 °C)   Resp 19   Ht 5' 5\" (1.651 m)   Wt 74.4 kg (164 lb)   SpO2 95%   BMI 27.29 kg/m²         Intake/Output Summary (Last 24 hours) at 12/31/2021 1631  Last data filed at 12/31/2021 4234  Gross per 24 hour   Intake    Output 500 ml   Net -500 ml        Physical Examination:     I had a face to face encounter with this patient and independently examined them on 12/31/2021 as outlined below:          Constitutional:  No acute distress, cooperative, pleasant    ENT:  Oral mucosa moist, oropharynx benign. Resp:  diminished bilaterally. No accessory muscle use   CV:  Regular rhythm, normal rate, no murmurs, gallops, rubs    GI:  Soft, non distended, non tender.  normoactive bowel sounds, no hepatosplenomegaly     Musculoskeletal:  No edema, warm, 2+ pulses throughout    Neurologic:  Moves all extremities            Data Review:    Review and/or order of clinical lab test  Review and/or order of tests in the radiology section of CPT  Review and/or order of tests in the medicine section of CPT      Labs:     Recent Labs     12/31/21 0113 12/30/21  0845   WBC 10.9 6.9   HGB 7.7* 8.6*   HCT 23.8* 27.2*    339     Recent Labs     12/31/21 0113 12/30/21  0845   * 137   K 3.4* 3.6    105   CO2 21 21   BUN 44* 45*   CREA 2.27* 2.31*   * 242*   CA 9.2 8.5   MG 2.0  --    PHOS 3.7  --      Recent Labs     12/31/21  0113 12/30/21  0845   ALT 9* 7*   AP 87 95   TBILI 0.8 0.4   TP 6.6 6.4   ALB 3.4* 3.3*   GLOB 3.2 3.1     No results for input(s): INR, PTP, APTT, INREXT in the last 72 hours. No results for input(s): FE, TIBC, PSAT, FERR in the last 72 hours. Lab Results   Component Value Date/Time    Folate 7.8 12/14/2021 04:40 AM      No results for input(s): PH, PCO2, PO2 in the last 72 hours. No results for input(s): CPK, CKNDX, TROIQ in the last 72 hours.     No lab exists for component: CPKMB  Lab Results   Component Value Date/Time    Cholesterol, total 148 09/23/2020 04:27 AM    HDL Cholesterol 52 09/23/2020 04:27 AM    LDL, calculated 83.8 09/23/2020 04:27 AM    Triglyceride 61 09/23/2020 04:27 AM    CHOL/HDL Ratio 2.8 09/23/2020 04:27 AM     Lab Results   Component Value Date/Time    Glucose (POC) 108 12/21/2021 06:15 AM    Glucose (POC) 148 (H) 12/20/2021 09:54 PM    Glucose (POC) 156 (H) 12/18/2021 11:53 AM    Glucose (POC) 96 12/18/2021 10:18 AM    Glucose (POC) 120 (H) 04/24/2021 04:54 PM     Lab Results   Component Value Date/Time    Color YELLOW/STRAW 12/30/2021 08:23 PM    Appearance CLOUDY (A) 12/30/2021 08:23 PM    Specific gravity 1.017 12/30/2021 08:23 PM    pH (UA) 6.0 12/30/2021 08:23 PM    Protein 100 (A) 12/30/2021 08:23 PM    Glucose Negative 12/30/2021 08:23 PM    Ketone Negative 12/30/2021 08:23 PM    Bilirubin Negative 12/30/2021 08:23 PM    Urobilinogen 1.0 12/30/2021 08:23 PM    Nitrites Negative 12/30/2021 08:23 PM    Leukocyte Esterase MODERATE (A) 12/30/2021 08:23 PM    Epithelial cells FEW 12/30/2021 08:23 PM    Bacteria 1+ (A) 12/30/2021 08:23 PM    WBC 0-4 12/30/2021 08:23 PM    RBC 0-5 12/30/2021 08:23 PM         Medications Reviewed:     Current Facility-Administered Medications   Medication Dose Route Frequency    apixaban (ELIQUIS) tablet 2.5 mg  2.5 mg Oral BID    ARIPiprazole (ABILIFY) tablet 20 mg  20 mg Oral DAILY    atorvastatin (LIPITOR) tablet 40 mg  40 mg Oral QHS    busPIRone (BUSPAR) tablet 5 mg  5 mg Oral TID    carbidopa-levodopa (SINEMET)  mg per tablet 2 Tablet  2 Tablet Oral QID    carvediloL (COREG) tablet 3.125 mg  3.125 mg Oral BID WITH MEALS    cholecalciferol (VITAMIN D3) (1000 Units /25 mcg) tablet 1,000 Units  1,000 Units Oral DAILY    cilostazoL (PLETAL) tablet 100 mg  100 mg Oral ACB&D    clopidogreL (PLAVIX) tablet 75 mg  75 mg Oral DAILY AFTER BREAKFAST    DULoxetine (CYMBALTA) capsule 120 mg  120 mg Oral DAILY    cyanocobalamin (VITAMIN B12) tablet 100 mcg  100 mcg Oral DAILY    gabapentin (NEURONTIN) capsule 100 mg  100 mg Oral QHS    melatonin tablet 6 mg  6 mg Oral QHS    pantoprazole (PROTONIX) tablet 40 mg  40 mg Oral ACB    nitroglycerin (NITROSTAT) tablet 0.4 mg  0.4 mg SubLINGual Q5MIN PRN    polyethylene glycol (MIRALAX) packet 17 g  17 g Oral DAILY PRN    senna-docusate (PERICOLACE) 8.6-50 mg per tablet 1 Tablet  1 Tablet Oral QHS    bumetanide (BUMEX) injection 1 mg  1 mg IntraVENous BID    dilTIAZem ER (CARDIZEM CD) capsule 120 mg  120 mg Oral DAILY    hydrALAZINE (APRESOLINE) tablet 50 mg  50 mg Oral TID     ______________________________________________________________________  EXPECTED LENGTH OF STAY: - - -  ACTUAL LENGTH OF STAY:          [address], DO

## 2021-12-31 NOTE — PROGRESS NOTES
2305: Rapid response called due to pt being in respiratory distress stating \"I can't breath, I can't breath, I'm really worried\" with increased diaphoresis, tachypnea, expiratory wheezing, and oxygen saturations at 86% on 6L NC.     2310: Rapid response team in room with pt. EKG completed showing NSR with ST & T abnormality. Respiratory put pt on midflow 10L and gave two albuterol nebulizer treatments. SUAD Bosch ordered 1mg IV bumex stat. 2327: IV bumex given. 0050Sarah Pritchard NP on pt's status: \"Pt looking better, not as anxious and oxygen saturations staying % on 10L. Pt has still not started voiding as purewick cannister is empty and breif is dry but BP is trending downward (most recent 115/52). Wheezing less audible now but still there, pt still states she is working hard to breathe through her nose and it is wearing her out. \" Orders received to give 1mg morphine IV now.    0103: Morphine given. 0200: Bladder scanned pt as she still has not voided. Bladder scan shows 265mls. SUAD Bosch updated. 0215: Macario Ray NP here to check on pt. Orders received to try and wean pt back down to 4L tonight.

## 2021-12-31 NOTE — PROGRESS NOTES
Nephrology Progress Note  Lloyd Thurston  Date of Admission : 12/30/2021    CC: Follow up for MORENO on CKD       Assessment and Plan     MORENO on CKD:  - diff: progressive disease vs CRS vs obstruction  - renal U/S neg for hydro, + for bladder distention  - cont diuretics  - bladder scan and place pittman if residual > 250cc  - daily labs and I/Os     CKD IIIa:  - baseline Cr around 1.4  - presumed chronic HTN     HTN:  - BP stable     Hypervolemia:  - diuretics as above    Hypoxia:  - presumed 2/2 CHF  - rapid COVID neg     Parkinsons  Hx of CVA  Afib with RVR  PAD w/ L CEA  CAD s/p CABG  AAA  HFpEF: last EF 50-55%       Interval History:  Seen and examined. More comfortable today. Cr stable,  Not voiding much. purewick in place. Had ECHO this AM.  Denies cp, n/v/d. Current Medications: all current  Medications have been eviewed in EPIC  Review of Systems: Pertinent items are noted in HPI. Objective:  Vitals:    Vitals:    12/31/21 0552 12/31/21 0800 12/31/21 1000 12/31/21 1126   BP:  133/61  133/61   Pulse: 72 73 67    Resp:  16     Temp:  98.9 °F (37.2 °C)     SpO2:  95%     Weight:    74.4 kg (164 lb)   Height:    5' 5\" (1.651 m)     Intake and Output:  12/31 0701 - 12/31 1900  In: -   Out: 100 [Urine:100]  12/29 1901 - 12/31 0700  In: 250 [I.V.:250]  Out: 400 [Urine:400]    Physical Examination:    General: NAD,Conversant   Neck:  Supple, no mass  Resp:  Lungs CTA B/L, no wheezing , normal respiratory effort  CV:  RRR,  no murmur or rub, 2+ LE edema  GI:  Soft, NT, + Bowel sounds, no hepatosplenomegaly  Neurologic:  Non focal  Psych:             AAO x 3 appropriate affect   Skin:  No Rash  :  No pittman    []    High complexity decision making was performed  []    Patient is at high-risk of decompensation with multiple organ involvement    Lab Data Personally Reviewed: I have reviewed all the pertinent labs, microbiology data and radiology studies during assessment.     Recent Labs     12/31/21  0119 12/30/21  0845   * 137   K 3.4* 3.6    105   CO2 21 21   * 242*   BUN 44* 45*   CREA 2.27* 2.31*   CA 9.2 8.5   MG 2.0  --    PHOS 3.7  --    ALB 3.4* 3.3*   ALT 9* 7*     Recent Labs     12/31/21  0113 12/30/21  0845   WBC 10.9 6.9   HGB 7.7* 8.6*   HCT 23.8* 27.2*    339     No results found for: SDES  Lab Results   Component Value Date/Time    Culture result: MRSA NOT PRESENT 12/10/2021 06:53 AM    Culture result:  12/10/2021 06:53 AM     Screening of patient nares for MRSA is for surveillance purposes and, if positive, to facilitate isolation considerations in high risk settings. It is not intended for automatic decolonization interventions per se as regimens are not sufficiently effective to warrant routine use.     Culture result: NO GROWTH 5 DAYS 12/10/2021 04:50 AM     Recent Results (from the past 24 hour(s))   LACTIC ACID    Collection Time: 12/30/21 12:58 PM   Result Value Ref Range    Lactic acid 0.7 0.4 - 2.0 MMOL/L   TROPONIN-HIGH SENSITIVITY    Collection Time: 12/30/21 12:58 PM   Result Value Ref Range    Troponin-High Sensitivity 383 (HH) 0 - 51 ng/L   TROPONIN-HIGH SENSITIVITY    Collection Time: 12/30/21  4:09 PM   Result Value Ref Range    Troponin-High Sensitivity 498 (HH) 0 - 51 ng/L   URINALYSIS W/ RFLX MICROSCOPIC    Collection Time: 12/30/21  8:23 PM   Result Value Ref Range    Color YELLOW/STRAW      Appearance CLOUDY (A) CLEAR      Specific gravity 1.017 1.003 - 1.030      pH (UA) 6.0 5.0 - 8.0      Protein 100 (A) NEG mg/dL    Glucose Negative NEG mg/dL    Ketone Negative NEG mg/dL    Bilirubin Negative NEG      Blood SMALL (A) NEG      Urobilinogen 1.0 0.2 - 1.0 EU/dL    Nitrites Negative NEG      Leukocyte Esterase MODERATE (A) NEG      WBC 0-4 0 - 4 /hpf    RBC 0-5 0 - 5 /hpf    Epithelial cells FEW FEW /lpf    Bacteria 1+ (A) NEG /hpf   EOSINOPHILS, URINE    Collection Time: 12/30/21  8:23 PM   Result Value Ref Range    Eosinophils,urine Negative PROTEIN/CREATININE RATIO, URINE    Collection Time: 12/30/21  8:23 PM   Result Value Ref Range    Protein, urine random 157 (H) 0.0 - 11.9 mg/dL    Creatinine, urine 75.40 mg/dL    Protein/Creat. urine Ratio 2.1     URINE CULTURE HOLD SAMPLE    Collection Time: 12/30/21  8:23 PM    Specimen: Serum   Result Value Ref Range    Urine culture hold        Urine on hold in Microbiology dept for 2 days. If unpreserved urine is submitted, it cannot be used for addtional testing after 24 hours, recollection will be required. CBC WITH AUTOMATED DIFF    Collection Time: 12/31/21  1:13 AM   Result Value Ref Range    WBC 10.9 3.6 - 11.0 K/uL    RBC 2.35 (L) 3.80 - 5.20 M/uL    HGB 7.7 (L) 11.5 - 16.0 g/dL    HCT 23.8 (L) 35.0 - 47.0 %    .3 (H) 80.0 - 99.0 FL    MCH 32.8 26.0 - 34.0 PG    MCHC 32.4 30.0 - 36.5 g/dL    RDW 14.0 11.5 - 14.5 %    PLATELET 318 948 - 869 K/uL    MPV 9.1 8.9 - 12.9 FL    NRBC 0.0 0  WBC    ABSOLUTE NRBC 0.00 0.00 - 0.01 K/uL    NEUTROPHILS 88 (H) 32 - 75 %    LYMPHOCYTES 3 (L) 12 - 49 %    MONOCYTES 8 5 - 13 %    EOSINOPHILS 1 0 - 7 %    BASOPHILS 0 0 - 1 %    IMMATURE GRANULOCYTES 0 0.0 - 0.5 %    ABS. NEUTROPHILS 9.6 (H) 1.8 - 8.0 K/UL    ABS. LYMPHOCYTES 0.3 (L) 0.8 - 3.5 K/UL    ABS. MONOCYTES 0.9 0.0 - 1.0 K/UL    ABS. EOSINOPHILS 0.1 0.0 - 0.4 K/UL    ABS. BASOPHILS 0.0 0.0 - 0.1 K/UL    ABS. IMM.  GRANS. 0.0 0.00 - 0.04 K/UL    DF SMEAR SCANNED      RBC COMMENTS ANISOCYTOSIS  1+        RBC COMMENTS MACROCYTOSIS  1+        RBC COMMENTS OVALOCYTES  PRESENT        RBC COMMENTS TRENT CELLS  PRESENT       METABOLIC PANEL, COMPREHENSIVE    Collection Time: 12/31/21  1:13 AM   Result Value Ref Range    Sodium 133 (L) 136 - 145 mmol/L    Potassium 3.4 (L) 3.5 - 5.1 mmol/L    Chloride 102 97 - 108 mmol/L    CO2 21 21 - 32 mmol/L    Anion gap 10 5 - 15 mmol/L    Glucose 187 (H) 65 - 100 mg/dL    BUN 44 (H) 6 - 20 MG/DL    Creatinine 2.27 (H) 0.55 - 1.02 MG/DL    BUN/Creatinine ratio 19 12 - 20      GFR est AA 25 (L) >60 ml/min/1.73m2    GFR est non-AA 21 (L) >60 ml/min/1.73m2    Calcium 9.2 8.5 - 10.1 MG/DL    Bilirubin, total 0.8 0.2 - 1.0 MG/DL    ALT (SGPT) 9 (L) 12 - 78 U/L    AST (SGOT) 14 (L) 15 - 37 U/L    Alk. phosphatase 87 45 - 117 U/L    Protein, total 6.6 6.4 - 8.2 g/dL    Albumin 3.4 (L) 3.5 - 5.0 g/dL    Globulin 3.2 2.0 - 4.0 g/dL    A-G Ratio 1.1 1.1 - 2.2     MAGNESIUM    Collection Time: 12/31/21  1:13 AM   Result Value Ref Range    Magnesium 2.0 1.6 - 2.4 mg/dL   PHOSPHORUS    Collection Time: 12/31/21  1:13 AM   Result Value Ref Range    Phosphorus 3.7 2.6 - 4.7 MG/DL   TROPONIN-HIGH SENSITIVITY    Collection Time: 12/31/21  1:13 AM   Result Value Ref Range    Troponin-High Sensitivity 412 (HH) 0 - 51 ng/L                   Aggie Rai MD  [address]  Phone - [phone]   Fax - [phone]  [email or web address]

## 2021-12-31 NOTE — ROUTINE PROCESS
TRANSFER - OUT REPORT:    Verbal report given to RN Lesli(name) on Alvaro Chaudhari  being transferred to 3(unit) for routine progression of care       Report consisted of patients Situation, Background, Assessment and   Recommendations(SBAR). Information from the following report(s) SBAR, Kardex, ED Summary and MAR was reviewed with the receiving nurse. Lines:   Peripheral IV 12/30/21 Right Antecubital (Active)        Opportunity for questions and clarification was provided.       Patient transported with:   [employer]

## 2021-12-31 NOTE — PROGRESS NOTES
TRANSFER - IN REPORT:    Verbal report received from Gerard Lizama RN(name) on [employer]  being received from ED(unit) for routine progression of care      Report consisted of patients Situation, Background, Assessment and   Recommendations(SBAR). Information from the following report(s) SBAR, Kardex, ED Summary and MAR was reviewed with the receiving nurse. Opportunity for questions and clarification was provided. Assessment completed upon patients arrival to unit and care assumed.

## 2021-12-31 NOTE — CARDIO/PULMONARY
Cardiac Rehab: Consult received and chart reviewed. Echo on 8/26/2021 reports EF 50% therefore patient does not meet the following criteria and is not a candidate for cardiac rehab. No new echo ordered and history indicates DHF.  EF ? 35% at time of enrollment   Stable class 2-4 NYHA heart failure   Optimal medical therapy for > 6 weeks   No major hospitalizations within the last 6 weeks   No major hospitalizations within the next 6 weeks  Clint Chiang RN

## 2022-01-01 LAB
ANION GAP SERPL CALC-SCNC: 8 MMOL/L (ref 5–15)
BNP SERPL-MCNC: ABNORMAL PG/ML
BUN SERPL-MCNC: 49 MG/DL (ref 6–20)
BUN/CREAT SERPL: 22 (ref 12–20)
CALCIUM SERPL-MCNC: 9.1 MG/DL (ref 8.5–10.1)
CHLORIDE SERPL-SCNC: 104 MMOL/L (ref 97–108)
CO2 SERPL-SCNC: 23 MMOL/L (ref 21–32)
CREAT SERPL-MCNC: 2.25 MG/DL (ref 0.55–1.02)
GLUCOSE SERPL-MCNC: 105 MG/DL (ref 65–100)
MAGNESIUM SERPL-MCNC: 2.1 MG/DL (ref 1.6–2.4)
POTASSIUM SERPL-SCNC: 3.7 MMOL/L (ref 3.5–5.1)
SODIUM SERPL-SCNC: 135 MMOL/L (ref 136–145)

## 2022-01-01 PROCEDURE — 83735 ASSAY OF MAGNESIUM: CPT

## 2022-01-01 PROCEDURE — 99232 SBSQ HOSP IP/OBS MODERATE 35: CPT | Performed by: SPECIALIST

## 2022-01-01 PROCEDURE — 51798 US URINE CAPACITY MEASURE: CPT

## 2022-01-01 PROCEDURE — 74011250637 HC RX REV CODE- 250/637: Performed by: HOSPITALIST

## 2022-01-01 PROCEDURE — 65660000000 HC RM CCU STEPDOWN

## 2022-01-01 PROCEDURE — 83880 ASSAY OF NATRIURETIC PEPTIDE: CPT

## 2022-01-01 PROCEDURE — 80048 BASIC METABOLIC PNL TOTAL CA: CPT

## 2022-01-01 PROCEDURE — 74011000250 HC RX REV CODE- 250: Performed by: HOSPITALIST

## 2022-01-01 PROCEDURE — 74011250637 HC RX REV CODE- 250/637: Performed by: NURSE PRACTITIONER

## 2022-01-01 PROCEDURE — 36415 COLL VENOUS BLD VENIPUNCTURE: CPT

## 2022-01-01 RX ADMIN — BUSPIRONE HYDROCHLORIDE 5 MG: 5 TABLET ORAL at 17:39

## 2022-01-01 RX ADMIN — DILTIAZEM HYDROCHLORIDE 120 MG: 120 CAPSULE, COATED, EXTENDED RELEASE ORAL at 09:02

## 2022-01-01 RX ADMIN — BUSPIRONE HYDROCHLORIDE 5 MG: 5 TABLET ORAL at 21:28

## 2022-01-01 RX ADMIN — BUMETANIDE 1 MG: 0.25 INJECTION INTRAMUSCULAR; INTRAVENOUS at 08:55

## 2022-01-01 RX ADMIN — APIXABAN 2.5 MG: 2.5 TABLET, FILM COATED ORAL at 17:39

## 2022-01-01 RX ADMIN — CILOSTAZOL 100 MG: 50 TABLET ORAL at 06:30

## 2022-01-01 RX ADMIN — CLOPIDOGREL BISULFATE 75 MG: 75 TABLET ORAL at 09:01

## 2022-01-01 RX ADMIN — BUSPIRONE HYDROCHLORIDE 5 MG: 5 TABLET ORAL at 09:02

## 2022-01-01 RX ADMIN — CARBIDOPA AND LEVODOPA 2 TABLET: 25; 100 TABLET ORAL at 21:29

## 2022-01-01 RX ADMIN — VITAM B12 100 MCG: 100 TAB at 09:02

## 2022-01-01 RX ADMIN — DOCUSATE SODIUM 50 MG AND SENNOSIDES 8.6 MG 1 TABLET: 8.6; 5 TABLET, FILM COATED ORAL at 21:28

## 2022-01-01 RX ADMIN — CILOSTAZOL 100 MG: 50 TABLET ORAL at 17:39

## 2022-01-01 RX ADMIN — HYDRALAZINE HYDROCHLORIDE 50 MG: 50 TABLET, FILM COATED ORAL at 21:29

## 2022-01-01 RX ADMIN — CARBIDOPA AND LEVODOPA 2 TABLET: 25; 100 TABLET ORAL at 12:51

## 2022-01-01 RX ADMIN — PANTOPRAZOLE SODIUM 40 MG: 40 TABLET, DELAYED RELEASE ORAL at 06:31

## 2022-01-01 RX ADMIN — HYDRALAZINE HYDROCHLORIDE 50 MG: 50 TABLET, FILM COATED ORAL at 08:56

## 2022-01-01 RX ADMIN — HYDRALAZINE HYDROCHLORIDE 50 MG: 50 TABLET, FILM COATED ORAL at 17:39

## 2022-01-01 RX ADMIN — CARBIDOPA AND LEVODOPA 2 TABLET: 25; 100 TABLET ORAL at 17:39

## 2022-01-01 RX ADMIN — CARVEDILOL 3.12 MG: 3.12 TABLET, FILM COATED ORAL at 17:39

## 2022-01-01 RX ADMIN — ARIPIPRAZOLE 20 MG: 10 TABLET ORAL at 09:02

## 2022-01-01 RX ADMIN — ATORVASTATIN CALCIUM 40 MG: 40 TABLET, FILM COATED ORAL at 21:29

## 2022-01-01 RX ADMIN — CARVEDILOL 3.12 MG: 3.12 TABLET, FILM COATED ORAL at 09:02

## 2022-01-01 RX ADMIN — BUMETANIDE 1 MG: 0.25 INJECTION INTRAMUSCULAR; INTRAVENOUS at 17:39

## 2022-01-01 RX ADMIN — APIXABAN 2.5 MG: 2.5 TABLET, FILM COATED ORAL at 09:03

## 2022-01-01 RX ADMIN — DULOXETINE HYDROCHLORIDE 120 MG: 60 CAPSULE, DELAYED RELEASE ORAL at 09:02

## 2022-01-01 RX ADMIN — GABAPENTIN 100 MG: 100 CAPSULE ORAL at 21:29

## 2022-01-01 RX ADMIN — Medication 1000 UNITS: at 09:01

## 2022-01-01 RX ADMIN — CARBIDOPA AND LEVODOPA 2 TABLET: 25; 100 TABLET ORAL at 09:02

## 2022-01-01 RX ADMIN — Medication 6 MG: at 21:29

## 2022-01-01 NOTE — PROGRESS NOTES
2230: Bladder scanned patient 100mL of urine shown. 0430: Patient has not voided during this shift. Bladder scanned patient, 417mL of urine shown. This RN encouraged the patient to void, patient unable to.    0500: Per Nephrology orders this RN attempted to insert pittman catheter. Unsuccessfully after two attempts. Patient tolerated well. 0530: Labor and delivery nurse came to attempt pittman insertion. Unsuccessful.    0600: Patient sitting in bed eating a snack, calls this RN into room and states \"You were successful, I am peeing. \"    2785: Incontinent episode unable to measure urine output. Post void bladder scan showed 167mL. Patient abdomen still distended and firm. Resting quietly, no complaints of pain or discomfort at this time.

## 2022-01-01 NOTE — PROGRESS NOTES
Nephrology Progress Note  Hernando Armijo  Date of Admission : 12/30/2021    CC: Follow up for MORENO on CKD       Assessment and Plan     MORENO on CKD:  - diff: progressive disease vs CRS vs obstruction  - renal U/S neg for hydro, + for bladder distention  - cont IV diuretics for now  - bladder scan and place pittman if residual > 250cc  - daily labs and I/Os     CKD IIIa:  - baseline Cr around 1.4  - presumed chronic HTN     HTN:  - BP stable     Hypervolemia:  -  improving  - diuretics as above    Hypoxia:  - presumed 2/2 CHF  - rapid COVID neg     Parkinsons  Hx of CVA  Afib with RVR  PAD w/ L CEA  CAD s/p CABG  AAA  HFpEF: repeat on 12/31 EF 50-55%       Interval History:  Seen and examined. More comfortable today. Cr stable. Unable to insert pittman, voiding better now. purewick in. Denies cp, sob. Current Medications: all current  Medications have been eviewed in EPIC  Review of Systems: Pertinent items are noted in HPI. Objective:  Vitals:    Vitals:    01/01/22 0815 01/01/22 0856 01/01/22 0903 01/01/22 1000   BP: (!) 112/40 (!) 118/41     Pulse: 60 64  66   Resp: 17      Temp:       SpO2: 97%  96%    Weight:       Height:         Intake and Output:  No intake/output data recorded. 12/30 1901 - 01/01 0700  In: 1080 [P.O.:1080]  Out: 0 [Urine:1600]    Physical Examination:    General: NAD,Conversant   Neck:  Supple, no mass  Resp:  Lungs CTA B/L, no wheezing , normal respiratory effort  CV:  RRR,  no murmur or rub, trace LE edema  GI:  Soft, NT, + Bowel sounds, no hepatosplenomegaly  Neurologic:  Non focal  Psych:             AAO x 3 appropriate affect   Skin:  No Rash  :  No pittmna    []    High complexity decision making was performed  []    Patient is at high-risk of decompensation with multiple organ involvement    Lab Data Personally Reviewed: I have reviewed all the pertinent labs, microbiology data and radiology studies during assessment.     Recent Labs     01/01/22  0533 12/31/21  0113 12/30/21  0845   * 133* 137   K 3.7 3.4* 3.6    102 105   CO2 23 21 21   * 187* 242*   BUN 49* 44* 45*   CREA 2.25* 2.27* 2.31*   CA 9.1 9.2 8.5   MG 2.1 2.0  --    PHOS  --  3.7  --    ALB  --  3.4* 3.3*   ALT  --  9* 7*     Recent Labs     12/31/21  0113 12/30/21  0845   WBC 10.9 6.9   HGB 7.7* 8.6*   HCT 23.8* 27.2*    339     No results found for: SDES  Lab Results   Component Value Date/Time    Culture result: MRSA NOT PRESENT 12/10/2021 06:53 AM    Culture result:  12/10/2021 06:53 AM     Screening of patient nares for MRSA is for surveillance purposes and, if positive, to facilitate isolation considerations in high risk settings. It is not intended for automatic decolonization interventions per se as regimens are not sufficiently effective to warrant routine use.     Culture result: NO GROWTH 5 DAYS 12/10/2021 04:50 AM     Recent Results (from the past 24 hour(s))   ECHO ADULT COMPLETE    Collection Time: 12/31/21 11:26 AM   Result Value Ref Range    IVSd 1.0 (A) 0.6 - 0.9 cm    LVIDd 4.5 3.9 - 5.3 cm    LVIDs 2.6 cm    LVOT Diameter 2.0 cm    LVPWd 0.8 0.6 - 0.9 cm    LVOT SV 89.8 ml    LVOT Peak Gradient 9 mmHg    LVOT Mean Gradient 5 mmHg    LVOT Peak Velocity 1.5 m/s    LVOT VTI 28.6 cm    RVSP 89 mmHg    RV Free Wall Peak S' 9 cm/s    LA Diameter 3.7 cm    LA Volume 4C 108 (A) 22 - 52 mL    Est. RA Pressure 8 mmHg    AV Area by Peak Velocity 1.8 cm2    AV Area by Peak Velocity 1.8 cm2    AV Area by Peak Velocity 1.8 cm2    AV Area by Peak Velocity 1.8 cm2    AV Area by VTI 1.8 cm2    AV Area by VTI 1.8 cm2    AR .0 millisecond    AR Max Velocity PISA 4.2 m/s    AV Peak Gradient 29 mmHg    AV Peak Gradient 26 mmHg    AV Mean Gradient 14 mmHg    AV Peak Velocity 2.6 m/s    AV Peak Velocity 2.7 m/s    AV Mean Velocity 1.7 m/s    AV VTI 51.9 cm    MV A Velocity 1.05 m/s    MV E Wave Deceleration Time 201.9 ms    MV E Velocity 1.48 m/s    LV E' Lateral Velocity 14 cm/s    LV E' Septal Velocity 6 cm/s    MV PHT 58.5 ms    MV Area by PHT 3.8 cm2    MR Peak Gradient 169 mmHg    MR Peak Velocity 6.5 m/s    PV Peak Gradient 5 mmHg    PV Max Velocity 1.1 m/s    TAPSE 1.8 1.5 - 2.0 cm    TR Peak Gradient 81 mmHg    TR Max Velocity 4.50 m/s    Aortic Root 2.8 cm    Fractional Shortening 2D 42 28 - 44 %    LVIDd Index 2.47 cm/m2    LVIDs Index 1.43 cm/m2    LV RWT Ratio 0.36     LV Mass 2D 132.8 67 - 162 g    LV Mass 2D Index 73.0 43 - 95 g/m2    MV E/A 1.41     E/E' Ratio (Averaged) 17.62     E/E' Lateral 10.57     E/E' Septal 24.67     LVOT Stroke Volume Index 49.3 mL/m2    LVOT Area 3.1 cm2    LA Volume Index 4C 59 (A) 16 - 34 mL/m2    LA Size Index 2.03 cm/m2    LA/AO Root Ratio 1.32     Ao Root Index 1.54 cm/m2    LVOT:AV VTI Index 0.55     PASP 45 mmHg   METABOLIC PANEL, BASIC    Collection Time: 01/01/22  5:33 AM   Result Value Ref Range    Sodium 135 (L) 136 - 145 mmol/L    Potassium 3.7 3.5 - 5.1 mmol/L    Chloride 104 97 - 108 mmol/L    CO2 23 21 - 32 mmol/L    Anion gap 8 5 - 15 mmol/L    Glucose 105 (H) 65 - 100 mg/dL    BUN 49 (H) 6 - 20 MG/DL    Creatinine 2.25 (H) 0.55 - 1.02 MG/DL    BUN/Creatinine ratio 22 (H) 12 - 20      GFR est AA 25 (L) >60 ml/min/1.73m2    GFR est non-AA 21 (L) >60 ml/min/1.73m2    Calcium 9.1 8.5 - 10.1 MG/DL   MAGNESIUM    Collection Time: 01/01/22  5:33 AM   Result Value Ref Range    Magnesium 2.1 1.6 - 2.4 mg/dL   NT-PRO BNP    Collection Time: 01/01/22  5:33 AM   Result Value Ref Range    NT pro-BNP 13,027 (H) <450 PG/ML                   Tatum Resendiz MD  [address]  Phone - [phone]   Fax - [phone]  [email or web address]

## 2022-01-01 NOTE — PROGRESS NOTES
1/1/2022     Admit Date: 12/30/2021    Admit Diagnosis: CHF exacerbation ([facility] [I50.9]    Active Problems:    CHF exacerbation ([facility] (9/25/2020)        Subjective:  Comfortable at rest and weight is down from admission. Urine output is uncertain because of bladder retention issues which are being addressed and her creatinine is relatively stable though higher than it was in the recent past.  Blood pressure and heart rate are well controlled on current meds       Damari Peyer denies chest pain, palpitations, syncope, orthopnea, paroxysmal nocturnal dyspnea, exertional chest pressure/discomfort, claudication. Assessment/Plan: Things are clearly moving in the right direction with her but we may be limited by her renal insufficiency in terms of ability to completely diurese her. Would continue current treatment for now. Cathy Thayer MD    Objective:     Visit Vitals  BP (!) 113/50 (BP 1 Location: Right upper arm, BP Patient Position: At rest)   Pulse 60   Temp 98.4 °F (36.9 °C)   Resp 17   Ht 5' 5\" (1.651 m)   Wt 166 lb 7.2 oz (75.5 kg)   SpO2 94%   BMI 27.70 kg/m²        Physical Exam:  Neck: supple, symmetrical, trachea midline, no adenopathy, no carotid bruit and no JVD  Heart: regular rate and rhythm, S1, S2 normal, no murmur, click, rub or gallop  Lungs: few scattered crackles and wheezes  Abdomen: soft, non-tender. Bowel sounds normal. No masses,  no organomegaly  Extremities: venous stasis dermatitis noted, edema tr      Labs:    No results for input(s): CPK, CKMB, TROIQ in the last 72 hours. No lab exists for component: CKQMB, CPKMB  Recent Labs     01/01/22  0533 12/31/21 0113 12/31/21 0113   *   < > 133*   K 3.7   < > 3.4*      < > 102   BUN 49*   < > 44*   CREA 2.25*   < > 2.27*   *   < > 187*   PHOS  --   --  3.7   CA 9.1   < > 9.2    < > = values in this interval not displayed.      Recent Labs     12/31/21 0113   WBC 10.9   HGB 7.7*   HCT 23.8*      No results for input(s): CHOL, LDLC in the last 72 hours.     No lab exists for component: TGL, HDLC,  HBA1C    Telemetry: normal sinus rhythm     Current Facility-Administered Medications   Medication Dose Route Frequency    apixaban (ELIQUIS) tablet 2.5 mg  2.5 mg Oral BID    ARIPiprazole (ABILIFY) tablet 20 mg  20 mg Oral DAILY    atorvastatin (LIPITOR) tablet 40 mg  40 mg Oral QHS    busPIRone (BUSPAR) tablet 5 mg  5 mg Oral TID    carbidopa-levodopa (SINEMET)  mg per tablet 2 Tablet  2 Tablet Oral QID    carvediloL (COREG) tablet 3.125 mg  3.125 mg Oral BID WITH MEALS    cholecalciferol (VITAMIN D3) (1000 Units /25 mcg) tablet 1,000 Units  1,000 Units Oral DAILY    cilostazoL (PLETAL) tablet 100 mg  100 mg Oral ACB&D    clopidogreL (PLAVIX) tablet 75 mg  75 mg Oral DAILY AFTER BREAKFAST    DULoxetine (CYMBALTA) capsule 120 mg  120 mg Oral DAILY    cyanocobalamin (VITAMIN B12) tablet 100 mcg  100 mcg Oral DAILY    gabapentin (NEURONTIN) capsule 100 mg  100 mg Oral QHS    melatonin tablet 6 mg  6 mg Oral QHS    pantoprazole (PROTONIX) tablet 40 mg  40 mg Oral ACB    nitroglycerin (NITROSTAT) tablet 0.4 mg  0.4 mg SubLINGual Q5MIN PRN    polyethylene glycol (MIRALAX) packet 17 g  17 g Oral DAILY PRN    senna-docusate (PERICOLACE) 8.6-50 mg per tablet 1 Tablet  1 Tablet Oral QHS    bumetanide (BUMEX) injection 1 mg  1 mg IntraVENous BID    dilTIAZem ER (CARDIZEM CD) capsule 120 mg  120 mg Oral DAILY    hydrALAZINE (APRESOLINE) tablet 50 mg  50 mg Oral TID       Data Review: current meds, labs,recent radiology, intake/output/weight and problem list reviewed

## 2022-01-01 NOTE — PROGRESS NOTES
01/01/22 0903   Vital Signs   O2 Sat (%) 96 %  (2 liters)     Patient was titrated from 4 liters to 2 liters (baseline) over last 2 hours  Patient's edema much improved from yesterday morning. Yesterday +2 edema to bilateral lower extremities, today no edema. 01/01/22 0906   Edema   LLE No Edema   RLE No Edema     0912- Patient appears tired during med administration. Patient was asked what was wrong and she said she was up most of the night. There were 3 separate attempts by 3 different nurses to place pittman catheter, after L&D nurse stated difficulty of placing pittman due to anatomy, patient managed to void and post void was 167. No pittman placed.

## 2022-01-01 NOTE — PROGRESS NOTES
Physical Therapy  Order received, chart reviewed. Evaluation held. Pt with critical report of Troponin(412) and awaiting cardiology consult. Will follow up as able and appropriate.   Thank you,  Masha Garner, PT, DPT

## 2022-01-01 NOTE — PROGRESS NOTES
Occupational Therapy  Chart reviewed; currently with elevated troponin. Will defer OT at this time. Retry tomorrow as able.  Barbara Wilder OTR/L

## 2022-01-01 NOTE — PROGRESS NOTES
01/01/22 0856   Vital Signs   Pulse (Heart Rate) 64   BP (!) 118/41   MAP (Calculated) 67     Patient bp above level of needing to hold blood pressure meds.  Will increase frequency of checks to check if BP drops too low

## 2022-01-01 NOTE — PROGRESS NOTES
[facility] [facility]  Hospitalist Group                                                                                          Hospitalist Progress Note  [address] [facility],   Answering service: [phone] or [phone] from in house phone        Date of Service:  2022  NAME:  Juan David Zaidi  :  1940  MRN:  [id number]      Admission Summary:   Juan David Zaidi is a [de-identified] y.o. female who presents with sob   Ms. Shira Dewitt is an 23-year-old female with H/o afib, CAD, CKD 3, and CVA who presents to the ER with complaints of shortness of breath. Per EMS report, her symptoms started several hours prior to arrival. Per their report, she is on 2 L of home oxygen. Her oxygen saturations on her home 2 L were 81%. . She was placed on a nonrebreather with oxygen saturations in the mid 90s. She had reported some chest pain earlier. . She denies cough, however, she is coughing during the exam. She is not sure if she has been vaccinated for COVID-19,  She denies any leg swelling. She denies any other complaints. She was given bumex 1mg iv one dose in ER. Also gave one dose of IV levaquin and zosyn in ER  Currently pt Feels improved.        Interval history / Subjective: Follow-up heart failure exacerbation. Patient seen and examined earlier today. In good spirits. Continues to improve.         Assessment & Plan:     Acute on chronic respiratory failure with hypoxia:   Acute on chronic diastolic congestive heart failure:  -Suspected heart failure exacerbation  -Continue diuresis, strict I's and O's, daily weights  -Appreciate cardiology assistance  -continue bumex BID  -Echo ordered and pending    MORENO on CKD stage III:  -Nephrology following, appreciate recommendations  -Renal ultrasound  -unable to place pittman catheter     CAD s/p CABG:  -Continue home statin, Plavix    Hypertension:  -Continue carvedilol, diltiazem, hydralazine    Atrial fibrillation:  -Continue Eliquis, diltiazem, carvedilol    Hypokalemia: Replete    Anemia, microcytic: monitor closely     PT/OT    Code status: DNR  DVT prophylaxis: Ravin Lewis 1080 discussed with: Patient/Family  Anticipated Disposition: Home w/Family  Anticipated Discharge: Greater than 48 hours     Hospital Problems  Date Reviewed: 1/1/2022          Codes Class Noted POA    CHF exacerbation ([address] ICD-10-CM: I50.9  ICD-9-CM: 428.0  9/25/2020 Unknown                Review of Systems:   Negative unless stated above      Vital Signs:    Last 24hrs VS reviewed since prior progress note. Most recent are:  Visit Vitals  BP (!) 104/55 (BP 1 Location: Right upper arm, BP Patient Position: At rest)   Pulse 64   Temp 98.5 °F (36.9 °C)   Resp 18   Ht 5' 5\" (1.651 m)   Wt 75.5 kg (166 lb 7.2 oz)   SpO2 92%   BMI 27.70 kg/m²         Intake/Output Summary (Last 24 hours) at 1/1/2022 1607  Last data filed at 12/31/2021 1800  Gross per 24 hour   Intake 900 ml   Output 600 ml   Net 300 ml        Physical Examination:     I had a face to face encounter with this patient and independently examined them on 1/1/2022 as outlined below:          Constitutional:  No acute distress, cooperative, pleasant    ENT:  Oral mucosa moist, oropharynx benign. Resp:  diminished bilaterally. No accessory muscle use   CV:  Regular rhythm, normal rate, no murmurs, gallops, rubs    GI:  Soft, non distended, non tender.  normoactive bowel sounds, no hepatosplenomegaly     Musculoskeletal:  No edema, blisters bilaterally, warm, 2+ pulses throughout    Neurologic:  Moves all extremities            Data Review:    Review and/or order of clinical lab test  Review and/or order of tests in the radiology section of CPT  Review and/or order of tests in the medicine section of CPT      Labs:     Recent Labs     12/31/21  0113 12/30/21  0845   WBC 10.9 6.9   HGB 7.7* 8.6*   HCT 23.8* 27.2*    339     Recent Labs     01/01/22  0533 12/31/21  0113 12/30/21  0845   * 133* 137   K 3.7 3.4* 3.6    102 105   CO2 23 21 21   BUN 49* 44* 45*   CREA 2.25* 2.27* 2.31*   * 187* 242*   CA 9.1 9.2 8.5   MG 2.1 2.0  --    PHOS  --  3.7  --      Recent Labs     12/31/21  0113 12/30/21  0845   ALT 9* 7*   AP 87 95   TBILI 0.8 0.4   TP 6.6 6.4   ALB 3.4* 3.3*   GLOB 3.2 3.1     No results for input(s): INR, PTP, APTT, INREXT, INREXT in the last 72 hours. No results for input(s): FE, TIBC, PSAT, FERR in the last 72 hours. Lab Results   Component Value Date/Time    Folate 7.8 12/14/2021 04:40 AM      No results for input(s): PH, PCO2, PO2 in the last 72 hours. No results for input(s): CPK, CKNDX, TROIQ in the last 72 hours.     No lab exists for component: CPKMB  Lab Results   Component Value Date/Time    Cholesterol, total 148 09/23/2020 04:27 AM    HDL Cholesterol 52 09/23/2020 04:27 AM    LDL, calculated 83.8 09/23/2020 04:27 AM    Triglyceride 61 09/23/2020 04:27 AM    CHOL/HDL Ratio 2.8 09/23/2020 04:27 AM     Lab Results   Component Value Date/Time    Glucose (POC) 108 12/21/2021 06:15 AM    Glucose (POC) 148 (H) 12/20/2021 09:54 PM    Glucose (POC) 156 (H) 12/18/2021 11:53 AM    Glucose (POC) 96 12/18/2021 10:18 AM    Glucose (POC) 120 (H) 04/24/2021 04:54 PM     Lab Results   Component Value Date/Time    Color YELLOW/STRAW 12/30/2021 08:23 PM    Appearance CLOUDY (A) 12/30/2021 08:23 PM    Specific gravity 1.017 12/30/2021 08:23 PM    pH (UA) 6.0 12/30/2021 08:23 PM    Protein 100 (A) 12/30/2021 08:23 PM    Glucose Negative 12/30/2021 08:23 PM    Ketone Negative 12/30/2021 08:23 PM    Bilirubin Negative 12/30/2021 08:23 PM    Urobilinogen 1.0 12/30/2021 08:23 PM    Nitrites Negative 12/30/2021 08:23 PM    Leukocyte Esterase MODERATE (A) 12/30/2021 08:23 PM    Epithelial cells FEW 12/30/2021 08:23 PM    Bacteria 1+ (A) 12/30/2021 08:23 PM    WBC 0-4 12/30/2021 08:23 PM    RBC 0-5 12/30/2021 08:23 PM         Medications Reviewed:     Current Facility-Administered Medications   Medication Dose Route Frequency    apixaban (ELIQUIS) tablet 2.5 mg  2.5 mg Oral BID    ARIPiprazole (ABILIFY) tablet 20 mg  20 mg Oral DAILY    atorvastatin (LIPITOR) tablet 40 mg  40 mg Oral QHS    busPIRone (BUSPAR) tablet 5 mg  5 mg Oral TID    carbidopa-levodopa (SINEMET)  mg per tablet 2 Tablet  2 Tablet Oral QID    carvediloL (COREG) tablet 3.125 mg  3.125 mg Oral BID WITH MEALS    cholecalciferol (VITAMIN D3) (1000 Units /25 mcg) tablet 1,000 Units  1,000 Units Oral DAILY    cilostazoL (PLETAL) tablet 100 mg  100 mg Oral ACB&D    clopidogreL (PLAVIX) tablet 75 mg  75 mg Oral DAILY AFTER BREAKFAST    DULoxetine (CYMBALTA) capsule 120 mg  120 mg Oral DAILY    cyanocobalamin (VITAMIN B12) tablet 100 mcg  100 mcg Oral DAILY    gabapentin (NEURONTIN) capsule 100 mg  100 mg Oral QHS    melatonin tablet 6 mg  6 mg Oral QHS    pantoprazole (PROTONIX) tablet 40 mg  40 mg Oral ACB    nitroglycerin (NITROSTAT) tablet 0.4 mg  0.4 mg SubLINGual Q5MIN PRN    polyethylene glycol (MIRALAX) packet 17 g  17 g Oral DAILY PRN    senna-docusate (PERICOLACE) 8.6-50 mg per tablet 1 Tablet  1 Tablet Oral QHS    bumetanide (BUMEX) injection 1 mg  1 mg IntraVENous BID    dilTIAZem ER (CARDIZEM CD) capsule 120 mg  120 mg Oral DAILY    hydrALAZINE (APRESOLINE) tablet 50 mg  50 mg Oral TID     ______________________________________________________________________  EXPECTED LENGTH OF STAY: - - -  ACTUAL LENGTH OF STAY:          2                 Alea Parker DO

## 2022-01-02 ENCOUNTER — APPOINTMENT (OUTPATIENT)
Dept: GENERAL RADIOLOGY | Age: 82
DRG: 291 | End: 2022-01-02
Attending: INTERNAL MEDICINE
Payer: MEDICARE

## 2022-01-02 LAB
ALBUMIN SERPL-MCNC: 2.9 G/DL (ref 3.5–5)
ANION GAP SERPL CALC-SCNC: 5 MMOL/L (ref 5–15)
BUN SERPL-MCNC: 49 MG/DL (ref 6–20)
BUN/CREAT SERPL: 21 (ref 12–20)
CALCIUM SERPL-MCNC: 9 MG/DL (ref 8.5–10.1)
CHLORIDE SERPL-SCNC: 104 MMOL/L (ref 97–108)
CO2 SERPL-SCNC: 24 MMOL/L (ref 21–32)
CREAT SERPL-MCNC: 2.35 MG/DL (ref 0.55–1.02)
GLUCOSE SERPL-MCNC: 182 MG/DL (ref 65–100)
PHOSPHATE SERPL-MCNC: 4 MG/DL (ref 2.6–4.7)
POTASSIUM SERPL-SCNC: 4 MMOL/L (ref 3.5–5.1)
SODIUM SERPL-SCNC: 133 MMOL/L (ref 136–145)

## 2022-01-02 PROCEDURE — 74011000250 HC RX REV CODE- 250: Performed by: HOSPITALIST

## 2022-01-02 PROCEDURE — 36415 COLL VENOUS BLD VENIPUNCTURE: CPT

## 2022-01-02 PROCEDURE — 51798 US URINE CAPACITY MEASURE: CPT

## 2022-01-02 PROCEDURE — 97530 THERAPEUTIC ACTIVITIES: CPT

## 2022-01-02 PROCEDURE — 74011250637 HC RX REV CODE- 250/637: Performed by: NURSE PRACTITIONER

## 2022-01-02 PROCEDURE — 97162 PT EVAL MOD COMPLEX 30 MIN: CPT

## 2022-01-02 PROCEDURE — 65660000000 HC RM CCU STEPDOWN

## 2022-01-02 PROCEDURE — 71045 X-RAY EXAM CHEST 1 VIEW: CPT

## 2022-01-02 PROCEDURE — 80069 RENAL FUNCTION PANEL: CPT

## 2022-01-02 PROCEDURE — 99232 SBSQ HOSP IP/OBS MODERATE 35: CPT | Performed by: SPECIALIST

## 2022-01-02 PROCEDURE — 74011250637 HC RX REV CODE- 250/637: Performed by: HOSPITALIST

## 2022-01-02 RX ADMIN — HYDRALAZINE HYDROCHLORIDE 50 MG: 50 TABLET, FILM COATED ORAL at 21:38

## 2022-01-02 RX ADMIN — BUMETANIDE 1 MG: 0.25 INJECTION INTRAMUSCULAR; INTRAVENOUS at 17:19

## 2022-01-02 RX ADMIN — GABAPENTIN 100 MG: 100 CAPSULE ORAL at 21:38

## 2022-01-02 RX ADMIN — Medication 6 MG: at 21:38

## 2022-01-02 RX ADMIN — BUSPIRONE HYDROCHLORIDE 5 MG: 5 TABLET ORAL at 17:15

## 2022-01-02 RX ADMIN — ATORVASTATIN CALCIUM 40 MG: 40 TABLET, FILM COATED ORAL at 21:38

## 2022-01-02 RX ADMIN — CARBIDOPA AND LEVODOPA 2 TABLET: 25; 100 TABLET ORAL at 21:37

## 2022-01-02 RX ADMIN — BUMETANIDE 1 MG: 0.25 INJECTION INTRAMUSCULAR; INTRAVENOUS at 08:33

## 2022-01-02 RX ADMIN — BUSPIRONE HYDROCHLORIDE 5 MG: 5 TABLET ORAL at 21:38

## 2022-01-02 RX ADMIN — DILTIAZEM HYDROCHLORIDE 120 MG: 120 CAPSULE, COATED, EXTENDED RELEASE ORAL at 08:33

## 2022-01-02 RX ADMIN — APIXABAN 2.5 MG: 2.5 TABLET, FILM COATED ORAL at 08:33

## 2022-01-02 RX ADMIN — CARBIDOPA AND LEVODOPA 2 TABLET: 25; 100 TABLET ORAL at 17:18

## 2022-01-02 RX ADMIN — HYDRALAZINE HYDROCHLORIDE 50 MG: 50 TABLET, FILM COATED ORAL at 17:15

## 2022-01-02 RX ADMIN — CARVEDILOL 3.12 MG: 3.12 TABLET, FILM COATED ORAL at 08:32

## 2022-01-02 RX ADMIN — HYDRALAZINE HYDROCHLORIDE 50 MG: 50 TABLET, FILM COATED ORAL at 08:34

## 2022-01-02 RX ADMIN — CLOPIDOGREL BISULFATE 75 MG: 75 TABLET ORAL at 08:33

## 2022-01-02 RX ADMIN — BUSPIRONE HYDROCHLORIDE 5 MG: 5 TABLET ORAL at 08:33

## 2022-01-02 RX ADMIN — PANTOPRAZOLE SODIUM 40 MG: 40 TABLET, DELAYED RELEASE ORAL at 07:05

## 2022-01-02 RX ADMIN — DULOXETINE HYDROCHLORIDE 120 MG: 60 CAPSULE, DELAYED RELEASE ORAL at 08:33

## 2022-01-02 RX ADMIN — Medication 1000 UNITS: at 08:33

## 2022-01-02 RX ADMIN — CILOSTAZOL 100 MG: 50 TABLET ORAL at 07:05

## 2022-01-02 RX ADMIN — CARVEDILOL 3.12 MG: 3.12 TABLET, FILM COATED ORAL at 17:15

## 2022-01-02 RX ADMIN — ARIPIPRAZOLE 20 MG: 10 TABLET ORAL at 08:33

## 2022-01-02 RX ADMIN — CILOSTAZOL 100 MG: 50 TABLET ORAL at 17:15

## 2022-01-02 RX ADMIN — VITAM B12 100 MCG: 100 TAB at 08:33

## 2022-01-02 RX ADMIN — CARBIDOPA AND LEVODOPA 2 TABLET: 25; 100 TABLET ORAL at 12:36

## 2022-01-02 RX ADMIN — APIXABAN 2.5 MG: 2.5 TABLET, FILM COATED ORAL at 17:19

## 2022-01-02 RX ADMIN — CARBIDOPA AND LEVODOPA 2 TABLET: 25; 100 TABLET ORAL at 08:33

## 2022-01-02 NOTE — PROGRESS NOTES
[facility] [facility] Adult  Hospitalist Group                                                                                          Hospitalist Progress Note  [address] [facility],   Answering service: [phone] from in house phone        Date of Service:  2022  NAME:  Scot Parisi  :  1940  MRN:  [id number]      Admission Summary:   Scot Parisi is a [de-identified] y.o. female who presents with sob   MsRenny Hawley is an 81-year-old female with H/o afib, CAD, CKD 3, and CVA who presents to the ER with complaints of shortness of breath. Per EMS report, her symptoms started several hours prior to arrival. Per their report, she is on 2 L of home oxygen. Her oxygen saturations on her home 2 L were 81%. . She was placed on a nonrebreather with oxygen saturations in the mid 90s. She had reported some chest pain earlier. . She denies cough, however, she is coughing during the exam. She is not sure if she has been vaccinated for COVID-19,  She denies any leg swelling. She denies any other complaints. She was given bumex 1mg iv one dose in ER. Also gave one dose of IV levaquin and zosyn in ER  Currently pt Feels improved.        Interval history / Subjective: Follow-up heart failure exacerbation. Patient seen and examined. Continues to improve- on 2liters. No specific complaints. Mildly tachycardic. BP on lower end        Assessment & Plan:     Acute on chronic respiratory failure with hypoxia: improving   Acute on chronic diastolic congestive heart failure:  -Suspected heart failure exacerbation  -Continue diuresis, strict I's and O's, daily weights  -Appreciate cardiology assistance  -continue bumex BID  --might be over diuresed with tachycardia and hypotension.  Will order chest x-ray to evaluate infiltrates  -Echo with normal EF    MORENO on CKD stage III:  -Nephrology following, appreciate recommendations  -Renal ultrasound consistent with renal medical parenchymal disease   -unable to place pittman catheter     CAD s/p CABG:  -Continue home statin, Plavix    Hypertension:  -Continue carvedilol, diltiazem, hydralazine    Atrial fibrillation:  -Continue Eliquis, diltiazem, carvedilol    Hypokalemia: Replete    Anemia, microcytic: monitor closely     PT/OT    Code status: DNR  DVT prophylaxis: Ravin Lewis 5145 discussed with: Patient/Family  Anticipated Disposition: Home w/Family  Anticipated Discharge: Greater than 48 hours     Hospital Problems  Date Reviewed: 1/1/2022          Codes Class Noted POA    CHF exacerbation ([facility] [address] ICD-10-CM: I50.9  ICD-9-CM: 428.0  9/25/2020 Unknown                Review of Systems:   Negative unless stated above      Vital Signs:    Last 24hrs VS reviewed since prior progress note. Most recent are:  Visit Vitals  BP (!) 87/72 (BP 1 Location: Right upper arm, BP Patient Position: At rest)   Pulse (!) 107   Temp 98.1 °F (36.7 °C)   Resp 18   Ht 5' 5\" (1.651 m)   Wt 75.3 kg (166 lb 0.1 oz)   SpO2 98%   BMI 27.62 kg/m²         Intake/Output Summary (Last 24 hours) at 1/2/2022 1539  Last data filed at 1/2/2022 1000  Gross per 24 hour   Intake 240 ml   Output 775 ml   Net -535 ml        Physical Examination:     I had a face to face encounter with this patient and independently examined them on 1/2/2022 as outlined below:          Constitutional:  No acute distress, cooperative, pleasant    ENT:  Oral mucosa moist, oropharynx benign. Resp:  diminished bilaterally. No accessory muscle use   CV:  Tachycardic, irregularly irregular, no murmurs, gallops, rubs    GI:  Soft, non distended, non tender.  normoactive bowel sounds, no hepatosplenomegaly     Musculoskeletal:  No edema, blisters bilaterally, warm, 2+ pulses throughout    Neurologic:  Moves all extremities            Data Review:    Review and/or order of clinical lab test  Review and/or order of tests in the radiology section of CPT  Review and/or order of tests in the medicine section of CPT      Labs:     Recent Labs 12/31/21 0113   WBC 10.9   HGB 7.7*   HCT 23.8*        Recent Labs     01/02/22  1200 01/01/22  0533 12/31/21 0113   * 135* 133*   K 4.0 3.7 3.4*    104 102   CO2 24 23 21   BUN 49* 49* 44*   CREA 2.35* 2.25* 2.27*   * 105* 187*   CA 9.0 9.1 9.2   MG  --  2.1 2.0   PHOS 4.0  --  3.7     Recent Labs     01/02/22  1200 12/31/21 0113   ALT  --  9*   AP  --  87   TBILI  --  0.8   TP  --  6.6   ALB 2.9* 3.4*   GLOB  --  3.2     No results for input(s): INR, PTP, APTT, INREXT, INREXT in the last 72 hours. No results for input(s): FE, TIBC, PSAT, FERR in the last 72 hours. Lab Results   Component Value Date/Time    Folate 7.8 12/14/2021 04:40 AM      No results for input(s): PH, PCO2, PO2 in the last 72 hours. No results for input(s): CPK, CKNDX, TROIQ in the last 72 hours.     No lab exists for component: CPKMB  Lab Results   Component Value Date/Time    Cholesterol, total 148 09/23/2020 04:27 AM    HDL Cholesterol 52 09/23/2020 04:27 AM    LDL, calculated 83.8 09/23/2020 04:27 AM    Triglyceride 61 09/23/2020 04:27 AM    CHOL/HDL Ratio 2.8 09/23/2020 04:27 AM     Lab Results   Component Value Date/Time    Glucose (POC) 108 12/21/2021 06:15 AM    Glucose (POC) 148 (H) 12/20/2021 09:54 PM    Glucose (POC) 156 (H) 12/18/2021 11:53 AM    Glucose (POC) 96 12/18/2021 10:18 AM    Glucose (POC) 120 (H) 04/24/2021 04:54 PM     Lab Results   Component Value Date/Time    Color YELLOW/STRAW 12/30/2021 08:23 PM    Appearance CLOUDY (A) 12/30/2021 08:23 PM    Specific gravity 1.017 12/30/2021 08:23 PM    pH (UA) 6.0 12/30/2021 08:23 PM    Protein 100 (A) 12/30/2021 08:23 PM    Glucose Negative 12/30/2021 08:23 PM    Ketone Negative 12/30/2021 08:23 PM    Bilirubin Negative 12/30/2021 08:23 PM    Urobilinogen 1.0 12/30/2021 08:23 PM    Nitrites Negative 12/30/2021 08:23 PM    Leukocyte Esterase MODERATE (A) 12/30/2021 08:23 PM    Epithelial cells FEW 12/30/2021 08:23 PM    Bacteria 1+ (A) 12/30/2021 08:23 PM    WBC 0-4 12/30/2021 08:23 PM    RBC 0-5 12/30/2021 08:23 PM         Medications Reviewed:     Current Facility-Administered Medications   Medication Dose Route Frequency    apixaban (ELIQUIS) tablet 2.5 mg  2.5 mg Oral BID    ARIPiprazole (ABILIFY) tablet 20 mg  20 mg Oral DAILY    atorvastatin (LIPITOR) tablet 40 mg  40 mg Oral QHS    busPIRone (BUSPAR) tablet 5 mg  5 mg Oral TID    carbidopa-levodopa (SINEMET)  mg per tablet 2 Tablet  2 Tablet Oral QID    carvediloL (COREG) tablet 3.125 mg  3.125 mg Oral BID WITH MEALS    cholecalciferol (VITAMIN D3) (1000 Units /25 mcg) tablet 1,000 Units  1,000 Units Oral DAILY    cilostazoL (PLETAL) tablet 100 mg  100 mg Oral ACB&D    clopidogreL (PLAVIX) tablet 75 mg  75 mg Oral DAILY AFTER BREAKFAST    DULoxetine (CYMBALTA) capsule 120 mg  120 mg Oral DAILY    cyanocobalamin (VITAMIN B12) tablet 100 mcg  100 mcg Oral DAILY    gabapentin (NEURONTIN) capsule 100 mg  100 mg Oral QHS    melatonin tablet 6 mg  6 mg Oral QHS    pantoprazole (PROTONIX) tablet 40 mg  40 mg Oral ACB    nitroglycerin (NITROSTAT) tablet 0.4 mg  0.4 mg SubLINGual Q5MIN PRN    polyethylene glycol (MIRALAX) packet 17 g  17 g Oral DAILY PRN    senna-docusate (PERICOLACE) 8.6-50 mg per tablet 1 Tablet  1 Tablet Oral QHS    bumetanide (BUMEX) injection 1 mg  1 mg IntraVENous BID    dilTIAZem ER (CARDIZEM CD) capsule 120 mg  120 mg Oral DAILY    hydrALAZINE (APRESOLINE) tablet 50 mg  50 mg Oral TID     ______________________________________________________________________  EXPECTED LENGTH OF STAY: - - -  ACTUAL LENGTH OF STAY:          [id number] GARY Gilliam DO

## 2022-01-02 NOTE — PROGRESS NOTES
Nephrology Progress Note  Ne Hernandez  Date of Admission : 12/30/2021    CC: Follow up for MORENO on CKD       Assessment and Plan     MORENO on CKD:  - diff: progressive disease vs CRS vs obstruction  - renal U/S neg for hydro, + for bladder distention  - Cr stable from 1/1, pending today  - cont IV diuretics for now   - daily labs and I/Os     CKD IIIa:  - baseline Cr around 1.4  - presumed chronic HTN     HTN:  - BP stable     Hypervolemia:  -  improving  - diuretics as above    Hypoxia:  - presumed 2/2 CHF  - rapid COVID neg     Parkinsons  Hx of CVA  Afib with RVR  PAD w/ L CEA  CAD s/p CABG  AAA  HFpEF: repeat on 12/31 EF 50-55%       Interval History:  Seen and examined. Stable. Labs pending from today. UOP not all measured due to some incontinence. Denies cp, sob, n/v/d    Current Medications: all current  Medications have been eviewed in EPIC  Review of Systems: Pertinent items are noted in HPI. Objective:  Vitals:    Vitals:    01/02/22 0400 01/02/22 0600 01/02/22 0800 01/02/22 1000   BP:   129/84    Pulse: (!) 107 (!) 104 (!) 114 (!) 104   Resp:   16    Temp:   98.1 °F (36.7 °C)    SpO2:   98%    Weight:       Height:         Intake and Output:  01/02 0701 - 01/02 1900  In: -   Out: 775 [Urine:775]  No intake/output data recorded. Physical Examination:    General: NAD,Conversant   Neck:  Supple, no mass  Resp:  Lungs CTA B/L, no wheezing , normal respiratory effort  CV:  RRR,  no murmur or rub, trace LE edema  GI:  Soft, NT, + Bowel sounds, no hepatosplenomegaly  Neurologic:  Non focal  Psych:             AAO x 3 appropriate affect   Skin:  No Rash  :  No pittman    []    High complexity decision making was performed  []    Patient is at high-risk of decompensation with multiple organ involvement    Lab Data Personally Reviewed: I have reviewed all the pertinent labs, microbiology data and radiology studies during assessment.     Recent Labs     01/01/22  0533 12/31/21  0113   * 133* K 3.7 3.4*    102   CO2 23 21   * 187*   BUN 49* 44*   CREA 2.25* 2.27*   CA 9.1 9.2   MG 2.1 2.0   PHOS  --  3.7   ALB  --  3.4*   ALT  --  9*     Recent Labs     12/31/21  0113   WBC 10.9   HGB 7.7*   HCT 23.8*        No results found for: SDES  Lab Results   Component Value Date/Time    Culture result: MRSA NOT PRESENT 12/10/2021 06:53 AM    Culture result:  12/10/2021 06:53 AM     Screening of patient nares for MRSA is for surveillance purposes and, if positive, to facilitate isolation considerations in high risk settings. It is not intended for automatic decolonization interventions per se as regimens are not sufficiently effective to warrant routine use. Culture result: NO GROWTH 5 DAYS 12/10/2021 04:50 AM     No results found for this or any previous visit (from the past 24 hour(s)). Melanie Wilson MD  [address]  Phone - [phone]   Fax - [phone]  [email or web address]

## 2022-01-02 NOTE — PROGRESS NOTES
1/2/2022     Admit Date: 12/30/2021    Admit Diagnosis: CHF exacerbation ([address] [I50.9]    Active Problems:    CHF exacerbation ([address] (9/25/2020)        Subjective:  1/1 Comfortable at rest and weight is down from admission. Urine output is uncertain because of bladder retention issues which are being addressed and her creatinine is relatively stable though higher than it was in the recent past.  Blood pressure and heart rate are well controlled on current meds    1/2 heart rate has trended up since yesterday evening, good urine output and labs are pending. Says her breathing is fine. Hollie Barnard denies chest pain, palpitations, syncope, orthopnea, paroxysmal nocturnal dyspnea, exertional chest pressure/discomfort, claudication. Assessment/Plan:  1/1 Things are clearly moving in the right direction with her but we may be limited by her renal insufficiency in terms of ability to completely diurese her. Would continue current treatment for now. 1/2 she tends to be relatively bradycardic which is limited her medical therapy in the past so the fact that the heart rates up now makes me think that she may have reached the appropriate level of diuresis. Consider cutting back on diuretics. Needs no further cardiac testing at this time. Charly De La Cruz MD    Objective:     Visit Vitals  /84 (BP 1 Location: Right upper arm, BP Patient Position: At rest)   Pulse (!) 104   Temp 98.1 °F (36.7 °C)   Resp 16   Ht 5' 5\" (1.651 m)   Wt 166 lb 0.1 oz (75.3 kg)   SpO2 98%   BMI 27.62 kg/m²        Physical Exam:  Neck: supple, symmetrical, trachea midline, no adenopathy, no carotid bruit and no JVD  Heart: regular rate and rhythm, S1, S2 normal, 1/6 systolic murmur, no click, rub or gallop  Lungs:clear  Abdomen: soft, non-tender.  Bowel sounds normal. No masses,  no organomegaly  Extremities: venous stasis dermatitis noted, edema tr      Labs:    No results for input(s): CPK, CKMB, TROIQ in the last 72 hours. No lab exists for component: CKQMB, CPKMB  Recent Labs     01/01/22  0533 12/31/21 0113 12/31/21 0113   *   < > 133*   K 3.7   < > 3.4*      < > 102   BUN 49*   < > 44*   CREA 2.25*   < > 2.27*   *   < > 187*   PHOS  --   --  3.7   CA 9.1   < > 9.2    < > = values in this interval not displayed. Recent Labs     12/31/21 0113   WBC 10.9   HGB 7.7*   HCT 23.8*        No results for input(s): CHOL, LDLC in the last 72 hours.     No lab exists for component: TGL, HDLC,  HBA1C    Telemetry: normal sinus rhythm     Current Facility-Administered Medications   Medication Dose Route Frequency    apixaban (ELIQUIS) tablet 2.5 mg  2.5 mg Oral BID    ARIPiprazole (ABILIFY) tablet 20 mg  20 mg Oral DAILY    atorvastatin (LIPITOR) tablet 40 mg  40 mg Oral QHS    busPIRone (BUSPAR) tablet 5 mg  5 mg Oral TID    carbidopa-levodopa (SINEMET)  mg per tablet 2 Tablet  2 Tablet Oral QID    carvediloL (COREG) tablet 3.125 mg  3.125 mg Oral BID WITH MEALS    cholecalciferol (VITAMIN D3) (1000 Units /25 mcg) tablet 1,000 Units  1,000 Units Oral DAILY    cilostazoL (PLETAL) tablet 100 mg  100 mg Oral ACB&D    clopidogreL (PLAVIX) tablet 75 mg  75 mg Oral DAILY AFTER BREAKFAST    DULoxetine (CYMBALTA) capsule 120 mg  120 mg Oral DAILY    cyanocobalamin (VITAMIN B12) tablet 100 mcg  100 mcg Oral DAILY    gabapentin (NEURONTIN) capsule 100 mg  100 mg Oral QHS    melatonin tablet 6 mg  6 mg Oral QHS    pantoprazole (PROTONIX) tablet 40 mg  40 mg Oral ACB    nitroglycerin (NITROSTAT) tablet 0.4 mg  0.4 mg SubLINGual Q5MIN PRN    polyethylene glycol (MIRALAX) packet 17 g  17 g Oral DAILY PRN    senna-docusate (PERICOLACE) 8.6-50 mg per tablet 1 Tablet  1 Tablet Oral QHS    bumetanide (BUMEX) injection 1 mg  1 mg IntraVENous BID    dilTIAZem ER (CARDIZEM CD) capsule 120 mg  120 mg Oral DAILY    hydrALAZINE (APRESOLINE) tablet 50 mg  50 mg Oral TID       Data Review: current meds, labs,recent radiology, intake/output/weight and problem list reviewed

## 2022-01-02 NOTE — PROGRESS NOTES
Problem: Heart Failure: Day 2  Goal: Activity/Safety  Outcome: Progressing Towards Goal  Goal: Diagnostic Test/Procedures  Outcome: Progressing Towards Goal  Goal: Nutrition/Diet  Outcome: Progressing Towards Goal

## 2022-01-02 NOTE — PROGRESS NOTES
Problem: Mobility Impaired (Adult and Pediatric)  Goal: *Acute Goals and Plan of Care (Insert Text)  Description: FUNCTIONAL STATUS PRIOR TO ADMISSION: The patient was functional at the wheelchair level and was modified independent for transfers to the wheelchair per pt report. Pt denies any falls in the last year. HOME SUPPORT PRIOR TO ADMISSION: The patient lived in [facility] due to her apartment lease ending in November 2021 and is looking for new living arrangements. Pt reports she has a niece in the area that assist her. Physical Therapy Goals  Initiated 1/2/2022  1. Patient will move from supine to sit and sit to supine  in bed with modified independence within 7 day(s). 2.  Patient will transfer from bed to chair and chair to bed with minimal assistance/contact guard assist using the least restrictive device within 7 day(s). 3.  Patient will perform sit to stand with minimal assistance/contact guard assist within 7 day(s). Outcome: Progressing Towards Goal   PHYSICAL THERAPY EVALUATION  Patient: Jeevan Samayoa (77 y.o. female)  Date: 1/2/2022  Primary Diagnosis: CHF exacerbation ([facility] [address] [I50.9]        Precautions:   Fall, contact precautions       ASSESSMENT  Based on the objective data described below, the patient presents with decreased activity tolerance due to tachycardia, low endurance, decreased strength throughout, decreased ROM with rigidity, history of Parkinson's disease, and impaired functional mobility below her baseline level of function. Pt is known to therapist from previous admission. Pt is alert and oriented x 4 however she is a limited historian and does not recall how she was getting around previously at [facility]. Pt reports she did not receive PT services but did have assistance for some mobility. Pt transferred supine > sit with minimal assistance and use of bed rail with extra time.   Pt sat briefly on EOB with good static balance but reported feeling awkward and requesting to return to bed. BP elevated and HR as high as 138. Pt reported no difficulty with her breathing at this time. Pt assisted back to bed with minimal assistance. Current Level of Function Impacting Discharge (mobility/balance): minimal assistance supine <> sit    Functional Outcome Measure: The patient scored Total Score: 1/28 on the Tinetti outcome measure which is indicative of high fall risk. Other factors to consider for discharge: high fall risk, history of Parkinson's disease, below her stated baseline     Patient will benefit from skilled therapy intervention to address the above noted impairments. PLAN :  Recommendations and Planned Interventions: bed mobility training, transfer training, gait training, therapeutic exercises, and patient and family training/education      Frequency/Duration: Patient will be followed by physical therapy:  3 times a week to address goals. Recommendation for discharge: (in order for the patient to meet his/her long term goals)  Therapy up to 5 days/week in SNF setting    This discharge recommendation:  A follow-up discussion with the attending provider and/or case management is planned    IF patient discharges home will need the following DME: patient owns DME required for discharge         SUBJECTIVE:   Patient stated Amanda Preciado came here because of my breathing.     OBJECTIVE DATA SUMMARY:   HISTORY:    Past Medical History:   Diagnosis Date    Anxiety disorder     Atrial fibrillation (HCC)     CAD (coronary artery disease) 2007    stents, CABG x 3v    Carotid stenosis     Cervical stenosis of spinal canal 07/2019    Chronic kidney disease     Cough     CVA (cerebral vascular accident) ([facility] [address] 07/2019    left lacunar infarct at head of caudate    Depression     AND CHRONIC ANXIETY    Diabetes ([facility] [address]     GERD (gastroesophageal reflux disease)     High cholesterol     History of peptic ulcer     Bleeding ulcer with increased NSAID use Hypertension     Left carotid stenosis 07/2019    s/p left CEA with Dr. Ebony Beckwith, old 2007    PUD (peptic ulcer disease)     Stroke [facility])     Tremor     Valvular heart disease      Past Surgical History:   Procedure Laterality Date    COLONOSCOPY N/A 12/17/2021    COLONOSCOPY   :- performed by Janet Simons MD at [facility] ENDOSCOPY    HX CAROTID ENDARTERECTOMY  07/2019    HX CORONARY ARTERY BYPASS GRAFT      HX TONSILLECTOMY  1963    AL CARDIAC SURG PROCEDURE UNLIST      CABG X3 VESSEL    AL TOTAL KNEE ARTHROPLASTY Right 2015       Personal factors and/or comorbidities impacting plan of care: Parkinson's disease, fall risk, pt reports she does not have a place to live yet    Home Situation  Home Environment: Long term care  [address] Name: Adrianna Mcdowell Residence: One story  Living Alone: No  Support Systems: [facility] Family Member(s)  Patient Expects to be Discharged to[de-identified] Long-term care  Current DME Used/Available at Home: [employer], rolling    EXAMINATION/PRESENTATION/DECISION MAKING:   Critical Behavior:  Neurologic State: Alert  Orientation Level: Oriented X4  Cognition: Appropriate for age attention/concentration,Follows commands  Safety/Judgement: Lack of insight into deficits  Hearing: Auditory  Auditory Impairment: [place]  Skin:  discoloration BLE, dry     Range Of Motion:  AROM: Generally decreased, functional, bilateral shoulder flexion moderately limited, pt reports she needs some assistance for ADLs due to limited ROM                       Strength:    Strength: Generally decreased, functional                    Tone & Sensation:   Tone: Abnormal              Sensation: Intact               Coordination:  Coordination: Generally decreased, functional       Functional Mobility:  Bed Mobility:     Supine to Sit: Minimum assistance;Assist x1;Additional time; Adaptive equipment  Sit to Supine: Minimum assistance;Assist x1;Adaptive equipment; Additional time  Scooting: Moderate assistance;Assist x1                          Balance:   Sitting: Intact; With support    Functional Measure:  Tinetti test:    Sitting Balance: 1  Arises: 0  Attempts to Rise: 0  Immediate Standing Balance: 0  Standing Balance: 0  Nudged: 0  Eyes Closed: 0  Turn 360 Degrees - Continuous/Discontinuous: 0  Turn 360 Degrees - Steady/Unsteady: 0  Sitting Down: 0  Balance Score: 1 Balance total score  Indication of Gait: 0  R Step Length/Height: 0  L Step Length/Height: 0  R Foot Clearance: 0  L Foot Clearance: 0  Step Symmetry: 0  Step Continuity: 0  Path: 0  Trunk: 0  Walking Time: 0  Gait Score: 0 Gait total score  Total Score: 1/28 Overall total score         Tinetti Tool Score Risk of Falls  <19 = High Fall Risk  19-24 = Moderate Fall Risk  25-28 = Low Fall Risk  Tinetti ME. Performance-Oriented Assessment of Mobility Problems in Elderly Patients. [facility]; [id number].  (Scoring Description: PT Bulletin Feb. 10, 1993)    Older adults: Rupa Dyer et al, 2009; n = 1000 [facility] elderly evaluated with ABC, KATJA, ADL, and IADL)  · Mean KATJA score for males aged 69-68 years = 26.21(3.40)  · Mean KATJA score for females age 69-68 years = 25.16(4.30)  · Mean KATJA score for males over 80 years = 23.29(6.02)  · Mean KATJA score for females over 80 years = 17.20(8.32)        Physical Therapy Evaluation Charge Determination   History Examination Presentation Decision-Making   HIGH Complexity :3+ comorbidities / personal factors will impact the outcome/ POC  MEDIUM Complexity : 3 Standardized tests and measures addressing body structure, function, activity limitation and / or participation in recreation  MEDIUM Complexity : Evolving with changing characteristics  MEDIUM Complexity : FOTO score of 26-74      Based on the above components, the patient evaluation is determined to be of the following complexity level: MEDIUM    Pain Rating:  Verbal: no complaints of pain     Activity Tolerance:   Poor, requires rest breaks, and HR and BP elevated with activity  Vitals:      01/02/22 1551 01/02/22 1600   BP:   (!) 129/90 111/69   BP 1 Location:   Right upper arm Right upper arm   BP Patient Position:   Sitting Lying   Pulse:   (!) 138 (!) 110   Temp:    98.3 °F (36.8 °C)   Resp:    16   Height:       Weight:       SpO2:   96% 98%        After treatment patient left in no apparent distress:   Supine in bed and Call bell within reach    COMMUNICATION/EDUCATION:   The patients plan of care was discussed with: Registered nurse. Fall prevention education was provided and the patient/caregiver indicated understanding. and Patient/family agree to work toward stated goals and plan of care.     Thank you for this referral.  Shadia Russell   Time Calculation: 22 mins

## 2022-01-03 LAB
ANION GAP SERPL CALC-SCNC: 8 MMOL/L (ref 5–15)
ATRIAL RATE: 150 BPM
BNP SERPL-MCNC: ABNORMAL PG/ML
BUN SERPL-MCNC: 49 MG/DL (ref 6–20)
BUN/CREAT SERPL: 23 (ref 12–20)
CALCIUM SERPL-MCNC: 8.9 MG/DL (ref 8.5–10.1)
CALCULATED R AXIS, ECG10: 114 DEGREES
CALCULATED T AXIS, ECG11: -67 DEGREES
CHLORIDE SERPL-SCNC: 102 MMOL/L (ref 97–108)
CO2 SERPL-SCNC: 23 MMOL/L (ref 21–32)
CREAT SERPL-MCNC: 2.13 MG/DL (ref 0.55–1.02)
DIAGNOSIS, 93000: NORMAL
ERYTHROCYTE [DISTWIDTH] IN BLOOD BY AUTOMATED COUNT: 13.4 % (ref 11.5–14.5)
GLUCOSE SERPL-MCNC: 124 MG/DL (ref 65–100)
HCT VFR BLD AUTO: 22.7 % (ref 35–47)
HGB BLD-MCNC: 7.5 G/DL (ref 11.5–16)
MAGNESIUM SERPL-MCNC: 2.2 MG/DL (ref 1.6–2.4)
MCH RBC QN AUTO: 32.9 PG (ref 26–34)
MCHC RBC AUTO-ENTMCNC: 33 G/DL (ref 30–36.5)
MCV RBC AUTO: 99.6 FL (ref 80–99)
NRBC # BLD: 0 K/UL (ref 0–0.01)
NRBC BLD-RTO: 0 PER 100 WBC
PHOSPHATE SERPL-MCNC: 4.4 MG/DL (ref 2.6–4.7)
PLATELET # BLD AUTO: 281 K/UL (ref 150–400)
PMV BLD AUTO: 9 FL (ref 8.9–12.9)
POTASSIUM SERPL-SCNC: 3.7 MMOL/L (ref 3.5–5.1)
Q-T INTERVAL, ECG07: 350 MS
QRS DURATION, ECG06: 138 MS
QTC CALCULATION (BEZET), ECG08: 506 MS
RBC # BLD AUTO: 2.28 M/UL (ref 3.8–5.2)
SODIUM SERPL-SCNC: 133 MMOL/L (ref 136–145)
VENTRICULAR RATE, ECG03: 126 BPM
WBC # BLD AUTO: 5.7 K/UL (ref 3.6–11)

## 2022-01-03 PROCEDURE — 97165 OT EVAL LOW COMPLEX 30 MIN: CPT

## 2022-01-03 PROCEDURE — 84100 ASSAY OF PHOSPHORUS: CPT

## 2022-01-03 PROCEDURE — 85027 COMPLETE CBC AUTOMATED: CPT

## 2022-01-03 PROCEDURE — 36415 COLL VENOUS BLD VENIPUNCTURE: CPT

## 2022-01-03 PROCEDURE — 77010033678 HC OXYGEN DAILY

## 2022-01-03 PROCEDURE — 74011250637 HC RX REV CODE- 250/637: Performed by: NURSE PRACTITIONER

## 2022-01-03 PROCEDURE — 83735 ASSAY OF MAGNESIUM: CPT

## 2022-01-03 PROCEDURE — 74011000250 HC RX REV CODE- 250: Performed by: HOSPITALIST

## 2022-01-03 PROCEDURE — 97535 SELF CARE MNGMENT TRAINING: CPT

## 2022-01-03 PROCEDURE — 93005 ELECTROCARDIOGRAM TRACING: CPT

## 2022-01-03 PROCEDURE — 94760 N-INVAS EAR/PLS OXIMETRY 1: CPT

## 2022-01-03 PROCEDURE — 74011250637 HC RX REV CODE- 250/637: Performed by: SPECIALIST

## 2022-01-03 PROCEDURE — 80048 BASIC METABOLIC PNL TOTAL CA: CPT

## 2022-01-03 PROCEDURE — 74011250637 HC RX REV CODE- 250/637: Performed by: HOSPITALIST

## 2022-01-03 PROCEDURE — 65660000000 HC RM CCU STEPDOWN

## 2022-01-03 PROCEDURE — 74011250637 HC RX REV CODE- 250/637: Performed by: INTERNAL MEDICINE

## 2022-01-03 PROCEDURE — 99232 SBSQ HOSP IP/OBS MODERATE 35: CPT | Performed by: SPECIALIST

## 2022-01-03 PROCEDURE — 83880 ASSAY OF NATRIURETIC PEPTIDE: CPT

## 2022-01-03 RX ORDER — CARVEDILOL 6.25 MG/1
6.25 TABLET ORAL 2 TIMES DAILY WITH MEALS
Status: DISCONTINUED | OUTPATIENT
Start: 2022-01-03 | End: 2022-01-04

## 2022-01-03 RX ORDER — BUMETANIDE 1 MG/1
1 TABLET ORAL 2 TIMES DAILY
Status: DISCONTINUED | OUTPATIENT
Start: 2022-01-03 | End: 2022-01-05

## 2022-01-03 RX ADMIN — CARVEDILOL 6.25 MG: 6.25 TABLET, FILM COATED ORAL at 17:03

## 2022-01-03 RX ADMIN — Medication 1000 UNITS: at 08:19

## 2022-01-03 RX ADMIN — HYDRALAZINE HYDROCHLORIDE 50 MG: 50 TABLET, FILM COATED ORAL at 21:28

## 2022-01-03 RX ADMIN — CARBIDOPA AND LEVODOPA 2 TABLET: 25; 100 TABLET ORAL at 17:03

## 2022-01-03 RX ADMIN — ATORVASTATIN CALCIUM 40 MG: 40 TABLET, FILM COATED ORAL at 21:28

## 2022-01-03 RX ADMIN — HYDRALAZINE HYDROCHLORIDE 50 MG: 50 TABLET, FILM COATED ORAL at 17:03

## 2022-01-03 RX ADMIN — CILOSTAZOL 100 MG: 50 TABLET ORAL at 17:04

## 2022-01-03 RX ADMIN — VITAM B12 100 MCG: 100 TAB at 08:20

## 2022-01-03 RX ADMIN — DULOXETINE HYDROCHLORIDE 120 MG: 60 CAPSULE, DELAYED RELEASE ORAL at 08:19

## 2022-01-03 RX ADMIN — APIXABAN 2.5 MG: 2.5 TABLET, FILM COATED ORAL at 08:20

## 2022-01-03 RX ADMIN — BUMETANIDE 1 MG: 1 TABLET ORAL at 17:04

## 2022-01-03 RX ADMIN — CARBIDOPA AND LEVODOPA 2 TABLET: 25; 100 TABLET ORAL at 13:00

## 2022-01-03 RX ADMIN — GABAPENTIN 100 MG: 100 CAPSULE ORAL at 21:28

## 2022-01-03 RX ADMIN — APIXABAN 2.5 MG: 2.5 TABLET, FILM COATED ORAL at 17:04

## 2022-01-03 RX ADMIN — CILOSTAZOL 100 MG: 50 TABLET ORAL at 07:18

## 2022-01-03 RX ADMIN — CARVEDILOL 3.12 MG: 3.12 TABLET, FILM COATED ORAL at 08:19

## 2022-01-03 RX ADMIN — BUMETANIDE 1 MG: 0.25 INJECTION INTRAMUSCULAR; INTRAVENOUS at 08:20

## 2022-01-03 RX ADMIN — CARBIDOPA AND LEVODOPA 2 TABLET: 25; 100 TABLET ORAL at 21:28

## 2022-01-03 RX ADMIN — BUSPIRONE HYDROCHLORIDE 5 MG: 5 TABLET ORAL at 17:03

## 2022-01-03 RX ADMIN — Medication 6 MG: at 21:28

## 2022-01-03 RX ADMIN — BUSPIRONE HYDROCHLORIDE 5 MG: 5 TABLET ORAL at 08:20

## 2022-01-03 RX ADMIN — HYDRALAZINE HYDROCHLORIDE 50 MG: 50 TABLET, FILM COATED ORAL at 08:20

## 2022-01-03 RX ADMIN — ARIPIPRAZOLE 20 MG: 10 TABLET ORAL at 11:32

## 2022-01-03 RX ADMIN — CLOPIDOGREL BISULFATE 75 MG: 75 TABLET ORAL at 08:19

## 2022-01-03 RX ADMIN — BUSPIRONE HYDROCHLORIDE 5 MG: 5 TABLET ORAL at 21:28

## 2022-01-03 RX ADMIN — PANTOPRAZOLE SODIUM 40 MG: 40 TABLET, DELAYED RELEASE ORAL at 07:18

## 2022-01-03 RX ADMIN — DILTIAZEM HYDROCHLORIDE 120 MG: 120 CAPSULE, COATED, EXTENDED RELEASE ORAL at 08:19

## 2022-01-03 RX ADMIN — CARBIDOPA AND LEVODOPA 2 TABLET: 25; 100 TABLET ORAL at 08:19

## 2022-01-03 NOTE — PROGRESS NOTES
[address] [place] [address]               Division of Cardiology  [phone]                       Progress note              Coral Durbin M.D., CHANTELLE. NAME:  Scot Parisi   :   1940   MRN:   [id number]         ICD-10-CM ICD-9-CM    1. Acute respiratory failure with hypoxia (HCC)  J96.01 518.81    2. Acute pulmonary edema (HCC)  J81.0 518.4    3. Renal insufficiency  N28.9 593.9    4. Elevated troponin  R77.8 790.6    5. Persistent atrial fibrillation (HCC)  I48.19 427.31    6. Coronary artery disease involving native coronary artery of native heart without angina pectoris  I25.10 414.01                  HPI  [de-identified]years old female history of atrial fibrillation CAD CKD and previous CVA who was admitted for shortness of breath. She feels better today. Assessment/Plan: 1. Atrial fibrillation: Chronic. G today with atrial fibrillation increased ventricular response and intraventricular conduction delay and nonspecific ST-T changes. Increase Coreg to 6.25 mg twice a day. Continue Eliquis. The patient remains anemic. This will be deferred to primary team.    2.  CAD: She seems relatively asymptomatic. Status post PCI of the LAD in 2021. Continue Plavix. Patient on aspirin account of 5 she is also on Eliquis. Continue Lipitor and Coreg as well. 3.  Shortness of breath: On diuretic. Starting p.o. today. She seems to be better compensated at this time. No additional cardiac intervention for now. I will sign off but remain available as needed. CARDIAC STUDIES      21    ECHO ADULT COMPLETE 2021    Interpretation Summary    Left Ventricle: Left ventricle size is normal. Normal wall thickness. Normal wall motion. Normal left ventricular systolic function with a visually estimated EF of 55 - 60%.   Aortic Valve: Mild stenosis.  Mild transvalvular regurgitation. Mean gradient of 14 mmhg.   Mitral Valve: Moderate mitral annular calcification. Mild to moderate transvalvular regurgitation.   Pulmonary Arteries: Mild pulmonary hypertension present. Estimated PA pressure of 45 mmHg.   Pericardium:  No pericardial effusion. Left pleural effusion. Signed by: Cameron Lynch MD on 12/31/2021 12:11 PM      04/30/20    NUCLEAR CARDIAC STRESS TEST 05/05/2020, 05/05/2020 5/5/2020    Interpretation Summary  · Baseline ECG: Normal sinus rhythm, non-specific ST-T wave abnormalities. · NM: No ischemia. Possible small distal anterior infarct. LVEF 53%. Rest and Lexiscan myocardial perfusion SPECT imaging is performed with IV injections of 10.6 and 32.9 mCi technetium 99m Sestamibi respectively. SPECT images displayed in three planes show no ischemic reversibility. There is a small focus of distal anterior wall fixed thinning, possible infarct. Gated SPECT images reviewed in 3 planes show unremarkable LV systolic wall thickening. There is no segmental wall motion abnormality of the left ventricular myocardium. The calculated LV ejection fraction is 53%. Pulmonary uptake and left ventricular cavity size appear normal.    Impression  : No ischemia. Possible small distal anterior infarct. LVEF 53%. Signed by: Melia Mcgovern MD on 5/5/2020 10:26 AM, Signed by: Khoa Ramírez MD on 5/5/2020 12:11 PM      03/29/21    INVASIVE VASCULAR PROCEDURE 04/05/2021 4/5/2021  CARDIAC PROCEDURE 04/05/2021 4/5/2021    Conclusion  Results: After angiography 60 to 70% stenosis of ostium of right renal artery was noted. On IVUS imaging the minimal luminal dimensions were 4 x 2.1 mm with MLA of 8 mm². Left renal angiogram demonstrated 50 to 60% angiographic stenosis which on IVUS appeared to be even less prominent with minimal luminal dimensions of 4.2 x 3.7 mm. Conclusion: Moderate to severe left renal artery stenosis and mild to moderate right renal arterial stenosis. Both stenotic lesions are ostial and atherosclerotic in etiology. Signed by: Julián Roldan MD on 4/5/2021 10:00 AM       @[email or web address]      IMAGING      CT Results (most recent):  Results from Hospital Encounter encounter on 10/29/21    CT ABD PELV WO CONT    Narrative  EXAM: CT ABD PELV WO CONT    INDICATION: abd pain and tenderness, fevers    COMPARISON: 4/23/2021    CONTRAST:  None. TECHNIQUE:  Thin axial images were obtained through the abdomen and pelvis. Coronal and  sagittal reformats were generated. Oral contrast was not administered. CT dose  reduction was achieved through use of a standardized protocol tailored for this  examination and automatic exposure control for dose modulation. The absence of intravenous contrast material reduces the sensitivity for  evaluation of the vasculature and solid organs. FINDINGS:  LOWER THORAX: No significant abnormality in the incidentally imaged lower chest.  LIVER: No mass. BILIARY TREE: Gallstones are noted. CBD is not dilated. SPLEEN: within normal limits. PANCREAS: No focal abnormality. ADRENALS: Unremarkable. KIDNEYS/URETERS: Unchanged bilateral renal cysts, no follow-up required. No  renal or ureteral stone or evidence of hydronephrosis. STOMACH: Unremarkable. SMALL BOWEL: No dilatation or wall thickening. COLON: No dilatation or wall thickening. APPENDIX: Not visualized. PERITONEUM: No ascites or pneumoperitoneum. RETROPERITONEUM: Unchanged 4.3 cm infrarenal abdominal aortic aneurysm. REPRODUCTIVE ORGANS: There is no pelvic mass or lymphadenopathy. URINARY BLADDER: No mass or calculus. BONES: Degenerative changes are seen in the lumbar spine. ABDOMINAL WALL: No mass or hernia. ADDITIONAL COMMENTS: N/A    Impression  Cholelithiasis. No acute abnormality.       XR Results (most recent):  Results from Hospital Encounter encounter on 12/30/21    XR CHEST PORT    Narrative  EXAM:  XR CHEST PORT    INDICATION:  Evaluate infiltrates    COMPARISON: 12/30/2021    TECHNIQUE: AP portable upright chest view    FINDINGS: Improved bilateral airspace opacities compared to the prior  radiograph. Coarse interstitial markings are still present in the lower lobes  bilaterally. No pleural effusion or pneumothorax. Heart size is normal. Osseous  structures are unchanged. Impression  Improved coarse interstitial opacities bilaterally.        MRI Results (most recent):          Past Medical History:   Diagnosis Date    Anxiety disorder     Atrial fibrillation ([address]     CAD (coronary artery disease) 2007    stents, CABG x 3v    Carotid stenosis     Cervical stenosis of spinal canal 07/2019    Chronic kidney disease     Cough     CVA (cerebral vascular accident) ([address] 07/2019    left lacunar infarct at head of caudate    Depression     AND CHRONIC ANXIETY    Diabetes ([address]     GERD (gastroesophageal reflux disease)     High cholesterol     History of peptic ulcer     Bleeding ulcer with increased NSAID use    Hypertension     Left carotid stenosis 07/2019    s/p left CEA with Dr. Andra Markham, old 2007    PUD (peptic ulcer disease)     Stroke ([address]     Tremor     Valvular heart disease            Past Surgical History:   Procedure Laterality Date    COLONOSCOPY N/A 12/17/2021    COLONOSCOPY   :- performed [facility] Waleska Winn MD at [facility] ENDOSCOPY    HX CAROTID ENDARTERECTOMY  07/2019    HX CORONARY ARTERY BYPASS GRAFT      HX TONSILLECTOMY  1963    PA CARDIAC SURG PROCEDURE UNLIST      CABG X3 VESSEL    PA TOTAL KNEE ARTHROPLASTY Right 2015               Visit Vitals  BP (!) 157/104 (BP 1 Location: Right upper arm, BP Patient Position: At rest;Lying)   Pulse (!) 110   Temp 98.2 °F (36.8 °C)   Resp 21   Ht 5' 5\" (1.651 m)   Wt 166 lb 7.2 oz (75.5 kg)   SpO2 98%   BMI 27.70 kg/m²         Wt Readings from Last 3 Encounters:   01/03/22 166 lb 7.2 oz (75.5 kg)   12/18/21 173 lb 8 oz (78.7 kg)   11/03/21 182 lb 8.7 oz (82.8 kg)         Review of Systems:   Pertinent items are noted in the History of Present Illness. 01/03 0701 - 01/03 1900  In: 354 [P.O.:354]  Out: -     01/01 1901 - 01/03 0700  In: 480 [P.O.:480]  Out: 4325 [Urine:4325]      Telemetry: AFIB    Physical Exam:    Neck: no JVD  Heart: irregularly irregular rhythm  Lungs: diminished breath sounds R base, L base  Abdomen: soft, non-tender.  Bowel sounds normal. No masses,  no organomegaly  Extremities: no edema    Current Facility-Administered Medications   Medication Dose Route Frequency    bumetanide (BUMEX) tablet 1 mg  1 mg Oral BID    apixaban (ELIQUIS) tablet 2.5 mg  2.5 mg Oral BID    ARIPiprazole (ABILIFY) tablet 20 mg  20 mg Oral DAILY    atorvastatin (LIPITOR) tablet 40 mg  40 mg Oral QHS    busPIRone (BUSPAR) tablet 5 mg  5 mg Oral TID    carbidopa-levodopa (SINEMET)  mg per tablet 2 Tablet  2 Tablet Oral QID    carvediloL (COREG) tablet 3.125 mg  3.125 mg Oral BID WITH MEALS    cholecalciferol (VITAMIN D3) (1000 Units /25 mcg) tablet 1,000 Units  1,000 Units Oral DAILY    cilostazoL (PLETAL) tablet 100 mg  100 mg Oral ACB&D    clopidogreL (PLAVIX) tablet 75 mg  75 mg Oral DAILY AFTER BREAKFAST    DULoxetine (CYMBALTA) capsule 120 mg  120 mg Oral DAILY    cyanocobalamin (VITAMIN B12) tablet 100 mcg  100 mcg Oral DAILY    gabapentin (NEURONTIN) capsule 100 mg  100 mg Oral QHS    melatonin tablet 6 mg  6 mg Oral QHS    pantoprazole (PROTONIX) tablet 40 mg  40 mg Oral ACB    nitroglycerin (NITROSTAT) tablet 0.4 mg  0.4 mg SubLINGual Q5MIN PRN    polyethylene glycol (MIRALAX) packet 17 g  17 g Oral DAILY PRN    senna-docusate (PERICOLACE) 8.6-50 mg per tablet 1 Tablet  1 Tablet Oral QHS    dilTIAZem ER (CARDIZEM CD) capsule 120 mg  120 mg Oral DAILY    hydrALAZINE (APRESOLINE) tablet 50 mg  50 mg Oral TID         All Cardiac Markers in the last 24 hours:    Lab Results   Component Value Date/Time    BNPNT 13,944 (H) 01/03/2022 03:44 AM        Lab Results Component Value Date/Time    Creatinine (POC) 1.6 (H) 02/24/2020 10:32 AM    Creatinine 2.13 (H) 01/03/2022 03:44 AM          Lab Results   Component Value Date/Time    CK 75 07/11/2019 09:35 AM    CK - MB 2.7 07/11/2019 09:35 AM    CK-MB Index 3.6 (H) 07/11/2019 09:35 AM    Troponin-I, Qt. 0.05 (H) 04/24/2021 04:54 AM     (H) 06/08/2014 04:12 AM         Lab Results   Component Value Date/Time    WBC 5.7 01/03/2022 03:44 AM    HGB 7.5 (L) 01/03/2022 03:44 AM    HCT 22.7 (L) 01/03/2022 03:44 AM    PLATELET 500 00/22/7857 03:44 AM    MCV 99.6 (H) 01/03/2022 03:44 AM         Lab Results   Component Value Date/Time    Sodium 133 (L) 01/03/2022 03:44 AM    Potassium 3.7 01/03/2022 03:44 AM    Chloride 102 01/03/2022 03:44 AM    CO2 23 01/03/2022 03:44 AM    Anion gap 8 01/03/2022 03:44 AM    Glucose 124 (H) 01/03/2022 03:44 AM    BUN 49 (H) 01/03/2022 03:44 AM    Creatinine 2.13 (H) 01/03/2022 03:44 AM    BUN/Creatinine ratio 23 (H) 01/03/2022 03:44 AM    GFR est AA 27 (L) 01/03/2022 03:44 AM    GFR est non-AA 22 (L) 01/03/2022 03:44 AM    Calcium 8.9 01/03/2022 03:44 AM         Lab Results   Component Value Date/Time     (H) 06/08/2014 04:12 AM     (H) 06/04/2014 04:44 AM    NT pro-BNP 13,944 (H) 01/03/2022 03:44 AM    NT pro-BNP 13,027 (H) 01/01/2022 05:33 AM    NT pro-BNP 17,648 (H) 12/30/2021 08:45 AM    NT pro-BNP 8,184 (H) 12/19/2021 03:17 AM    NT pro-BNP 19,048 (H) 12/12/2021 04:43 AM         Lab Results   Component Value Date/Time    Cholesterol, total 148 09/23/2020 04:27 AM    HDL Cholesterol 52 09/23/2020 04:27 AM    LDL, calculated 83.8 09/23/2020 04:27 AM    VLDL, calculated 12.2 09/23/2020 04:27 AM    Triglyceride 61 09/23/2020 04:27 AM    CHOL/HDL Ratio 2.8 09/23/2020 04:27 AM         Lab Results   Component Value Date/Time    ALT (SGPT) 9 (L) 12/31/2021 01:13 AM    Alk.  phosphatase 87 12/31/2021 01:13 AM    Bilirubin, total 0.8 12/31/2021 01:13 AM

## 2022-01-03 NOTE — PROGRESS NOTES
Nephrology Progress Note  Omayra Mckinnon  Date of Admission : 12/30/2021    CC: Follow up for MORENO on CKD       Assessment and Plan     MORENO on CKD:  - diff: progressive disease vs CRS vs obstruction  - renal U/S neg for hydro, + for bladder distention  - Cr trending down   - changed Bumex to 1 mg PO BID  - daily labs and I/Os    Afib with RVR  - per cards     CKD IIIa:  - baseline Cr around 1.4  - presumed chronic HTN     HTN:  - BP stable     Acute on Chronic HFpEF  CAD, hx of CABG  - ECHO w/ EF 55-60%  - rapid COVID neg  - diuretics as above      Parkinsons  Hx of CVA  PAD w/ L CEA  AAA         Interval History:  Seen and examined. CXR from 1/2 reviewed. Remains in rapid afib. Denies any cough/SOB. Denies cp, n/v/d    Current Medications: all current  Medications have been eviewed in EPIC  Review of Systems: Pertinent items are noted in HPI. Objective:  Vitals:    Vitals:    01/03/22 0334 01/03/22 0337 01/03/22 0349 01/03/22 0548   BP: 118/63 126/77     Pulse: (!) 107 (!) 106 (!) 120 (!) 118   Resp: 17 18     Temp: 98.1 °F (36.7 °C) 97.9 °F (36.6 °C)     SpO2: 100% 98%     Weight: 75.5 kg (166 lb 7.2 oz)      Height:         Intake and Output:  No intake/output data recorded. 01/01 1901 - 01/03 0700  In: 480 [P.O.:480]  Out: 4325 [Urine:4325]    Physical Examination:    General: NAD,Conversant   Neck:  Supple, no mass  Resp:  CTA  CV:  Irregular, tachy  GI:  Soft, NT, + Bowel sounds, no hepatosplenomegaly  Neurologic:  Non focal  Psych:             AAO x 3 appropriate affect     []    High complexity decision making was performed  []    Patient is at high-risk of decompensation with multiple organ involvement    Lab Data Personally Reviewed: I have reviewed all the pertinent labs, microbiology data and radiology studies during assessment.     Recent Labs     01/03/22  0344 01/02/22  1200 01/01/22  0533   * 133* 135*   K 3.7 4.0 3.7    104 104   CO2 23 24 23   * 182* 105*   BUN 49* 49* 49*   CREA 2.13* 2.35* 2.25*   CA 8.9 9.0 9.1   MG 2.2  --  2.1   PHOS 4.4 4.0  --    ALB  --  2.9*  --      Recent Labs     01/03/22  0344   WBC 5.7   HGB 7.5*   HCT 22.7*        No results found for: SDES  Lab Results   Component Value Date/Time    Culture result: MRSA NOT PRESENT 12/10/2021 06:53 AM    Culture result:  12/10/2021 06:53 AM     Screening of patient nares for MRSA is for surveillance purposes and, if positive, to facilitate isolation considerations in high risk settings. It is not intended for automatic decolonization interventions per se as regimens are not sufficiently effective to warrant routine use.     Culture result: NO GROWTH 5 DAYS 12/10/2021 04:50 AM     Recent Results (from the past 24 hour(s))   RENAL FUNCTION PANEL    Collection Time: 01/02/22 12:00 PM   Result Value Ref Range    Sodium 133 (L) 136 - 145 mmol/L    Potassium 4.0 3.5 - 5.1 mmol/L    Chloride 104 97 - 108 mmol/L    CO2 24 21 - 32 mmol/L    Anion gap 5 5 - 15 mmol/L    Glucose 182 (H) 65 - 100 mg/dL    BUN 49 (H) 6 - 20 MG/DL    Creatinine 2.35 (H) 0.55 - 1.02 MG/DL    BUN/Creatinine ratio 21 (H) 12 - 20      GFR est AA 24 (L) >60 ml/min/1.73m2    GFR est non-AA 20 (L) >60 ml/min/1.73m2    Calcium 9.0 8.5 - 10.1 MG/DL    Phosphorus 4.0 2.6 - 4.7 MG/DL    Albumin 2.9 (L) 3.5 - 5.0 g/dL   CBC W/O DIFF    Collection Time: 01/03/22  3:44 AM   Result Value Ref Range    WBC 5.7 3.6 - 11.0 K/uL    RBC 2.28 (L) 3.80 - 5.20 M/uL    HGB 7.5 (L) 11.5 - 16.0 g/dL    HCT 22.7 (L) 35.0 - 47.0 %    MCV 99.6 (H) 80.0 - 99.0 FL    MCH 32.9 26.0 - 34.0 PG    MCHC 33.0 30.0 - 36.5 g/dL    RDW 13.4 11.5 - 14.5 %    PLATELET 289 267 - 488 K/uL    MPV 9.0 8.9 - 12.9 FL    NRBC 0.0 0  WBC    ABSOLUTE NRBC 0.00 0.00 - 7.35 K/uL   METABOLIC PANEL, BASIC    Collection Time: 01/03/22  3:44 AM   Result Value Ref Range    Sodium 133 (L) 136 - 145 mmol/L    Potassium 3.7 3.5 - 5.1 mmol/L    Chloride 102 97 - 108 mmol/L    CO2 23 21 - 32 mmol/L    Anion gap 8 5 - 15 mmol/L    Glucose 124 (H) 65 - 100 mg/dL    BUN 49 (H) 6 - 20 MG/DL    Creatinine 2.13 (H) 0.55 - 1.02 MG/DL    BUN/Creatinine ratio 23 (H) 12 - 20      GFR est AA 27 (L) >60 ml/min/1.73m2    GFR est non-AA 22 (L) >60 ml/min/1.73m2    Calcium 8.9 8.5 - 10.1 MG/DL   MAGNESIUM    Collection Time: 01/03/22  3:44 AM   Result Value Ref Range    Magnesium 2.2 1.6 - 2.4 mg/dL   PHOSPHORUS    Collection Time: 01/03/22  3:44 AM   Result Value Ref Range    Phosphorus 4.4 2.6 - 4.7 MG/DL                   Luis Petty MD  [address]  Phone - [phone]   Fax - [phone]  [email or web address]

## 2022-01-03 NOTE — PROGRESS NOTES
[facility]  Hospitalist Group                                                                                          Hospitalist Progress Note  [facility],   Answering service: [phone] OR [phone] from in house phone        Date of Service:  1/3/2022  NAME:  Omayra Mckinnon  :  1940  MRN:  [id number]      Admission Summary:   Omayra Mckinnon is a [de-identified] y.o. female who presents with sob   MsRenny Graff is an 81-year-old female with H/o afib, CAD, CKD 3, and CVA who presents to the ER with complaints of shortness of breath. Per EMS report, her symptoms started several hours prior to arrival. Per their report, she is on 2 L of home oxygen. Her oxygen saturations on her home 2 L were 81%. . She was placed on a nonrebreather with oxygen saturations in the mid 90s. She had reported some chest pain earlier. . She denies cough, however, she is coughing during the exam. She is not sure if she has been vaccinated for COVID-19,  She denies any leg swelling. She denies any other complaints. She was given bumex 1mg iv one dose in ER. Also gave one dose of IV levaquin and zosyn in ER  Currently pt Feels improved.        Interval history / Subjective: Follow-up heart failure exacerbation. Patient seen and examined. No acute complaints- denies chest pain, shortness of breath. Eating breakfast. HR in 130s, atrial fibrillation. Cardiology aware.  Cr trending down        Assessment & Plan:     Acute on chronic respiratory failure with hypoxia: improving - on 2 liters   Acute on chronic diastolic congestive heart failure:  -Suspected heart failure exacerbation  -Continue diuresis, strict I's and O's, daily weights  -Appreciate cardiology and nephrology assistance  -continue bumex BID- transitioned to oral   -CXR 1/2 with improved bilateral infiltrates   -Echo with normal EF    Atrial fibrillation with RVR:   -Continue Eliquis, diltiazem, carvedilol  -dose adjustments per cardiology    MORENO on CKD stage III:  -Nephrology following, appreciate recommendations  -Renal ultrasound consistent with renal medical parenchymal disease   -unable to place pittman catheter     CAD s/p CABG:  -Continue home statin, Plavix    Hypertension:  -Continue carvedilol, diltiazem, hydralazine    Hypokalemia: Repleted    Anemia, microcytic: monitor closely     PT/OT    Code status: DNR  DVT prophylaxis: Ravin Lewis 0050 discussed with: Patient/Family  Anticipated Disposition: SNF  Anticipated Discharge: Greater than 48 hours     Hospital Problems  Date Reviewed: 1/1/2022          Codes Class Noted POA    CHF exacerbation ([facility] [address] ICD-10-CM: I50.9  ICD-9-CM: 428.0  9/25/2020 Unknown                Review of Systems:   Negative unless stated above      Vital Signs:    Last 24hrs VS reviewed since prior progress note. Most recent are:  Visit Vitals  /71 (BP 1 Location: Right upper arm, BP Patient Position: At rest;Lying)   Pulse (!) 130   Temp 97.7 °F (36.5 °C)   Resp 22   Ht 5' 5\" (1.651 m)   Wt 75.5 kg (166 lb 7.2 oz)   SpO2 93%   BMI 27.70 kg/m²         Intake/Output Summary (Last 24 hours) at 1/3/2022 1011  Last data filed at 1/3/2022 3527  Gross per 24 hour   Intake 594 ml   Output 3550 ml   Net -2956 ml        Physical Examination:     I had a face to face encounter with this patient and independently examined them on 1/3/2022 as outlined below:          Constitutional:  No acute distress, cooperative, pleasant    ENT:  Oral mucosa moist, oropharynx benign. Resp:  diminished bilaterally. No accessory muscle use   CV:  Tachycardic, irregularly irregular, no murmurs, gallops, rubs    GI:  Soft, non distended, non tender.  normoactive bowel sounds, no hepatosplenomegaly     Musculoskeletal:  No edema, blisters bilaterally, warm, 2+ pulses throughout    Neurologic:  Moves all extremities            Data Review:    Review and/or order of clinical lab test  Review and/or order of tests in the radiology section of CPT  Review and/or order of tests in the medicine section of CPT      Labs:     Recent Labs     01/03/22  0344   WBC 5.7   HGB 7.5*   HCT 22.7*        Recent Labs     01/03/22  0344 01/02/22  1200 01/01/22  0533   * 133* 135*   K 3.7 4.0 3.7    104 104   CO2 23 24 23   BUN 49* 49* 49*   CREA 2.13* 2.35* 2.25*   * 182* 105*   CA 8.9 9.0 9.1   MG 2.2  --  2.1   PHOS 4.4 4.0  --      Recent Labs     01/02/22  1200   ALB 2.9*     No results for input(s): INR, PTP, APTT, INREXT, INREXT in the last 72 hours. No results for input(s): FE, TIBC, PSAT, FERR in the last 72 hours. Lab Results   Component Value Date/Time    Folate 7.8 12/14/2021 04:40 AM      No results for input(s): PH, PCO2, PO2 in the last 72 hours. No results for input(s): CPK, CKNDX, TROIQ in the last 72 hours.     No lab exists for component: CPKMB  Lab Results   Component Value Date/Time    Cholesterol, total 148 09/23/2020 04:27 AM    HDL Cholesterol 52 09/23/2020 04:27 AM    LDL, calculated 83.8 09/23/2020 04:27 AM    Triglyceride 61 09/23/2020 04:27 AM    CHOL/HDL Ratio 2.8 09/23/2020 04:27 AM     Lab Results   Component Value Date/Time    Glucose (POC) 108 12/21/2021 06:15 AM    Glucose (POC) 148 (H) 12/20/2021 09:54 PM    Glucose (POC) 156 (H) 12/18/2021 11:53 AM    Glucose (POC) 96 12/18/2021 10:18 AM    Glucose (POC) 120 (H) 04/24/2021 04:54 PM     Lab Results   Component Value Date/Time    Color YELLOW/STRAW 12/30/2021 08:23 PM    Appearance CLOUDY (A) 12/30/2021 08:23 PM    Specific gravity 1.017 12/30/2021 08:23 PM    pH (UA) 6.0 12/30/2021 08:23 PM    Protein 100 (A) 12/30/2021 08:23 PM    Glucose Negative 12/30/2021 08:23 PM    Ketone Negative 12/30/2021 08:23 PM    Bilirubin Negative 12/30/2021 08:23 PM    Urobilinogen 1.0 12/30/2021 08:23 PM    Nitrites Negative 12/30/2021 08:23 PM    Leukocyte Esterase MODERATE (A) 12/30/2021 08:23 PM    Epithelial cells FEW 12/30/2021 08:23 PM    Bacteria 1+ (A) 12/30/2021 08:23 PM    WBC 0-4 12/30/2021 08:23 PM    RBC 0-5 12/30/2021 08:23 PM         Medications Reviewed:     Current Facility-Administered Medications   Medication Dose Route Frequency    bumetanide (BUMEX) tablet 1 mg  1 mg Oral BID    apixaban (ELIQUIS) tablet 2.5 mg  2.5 mg Oral BID    ARIPiprazole (ABILIFY) tablet 20 mg  20 mg Oral DAILY    atorvastatin (LIPITOR) tablet 40 mg  40 mg Oral QHS    busPIRone (BUSPAR) tablet 5 mg  5 mg Oral TID    carbidopa-levodopa (SINEMET)  mg per tablet 2 Tablet  2 Tablet Oral QID    carvediloL (COREG) tablet 3.125 mg  3.125 mg Oral BID WITH MEALS    cholecalciferol (VITAMIN D3) (1000 Units /25 mcg) tablet 1,000 Units  1,000 Units Oral DAILY    cilostazoL (PLETAL) tablet 100 mg  100 mg Oral ACB&D    clopidogreL (PLAVIX) tablet 75 mg  75 mg Oral DAILY AFTER BREAKFAST    DULoxetine (CYMBALTA) capsule 120 mg  120 mg Oral DAILY    cyanocobalamin (VITAMIN B12) tablet 100 mcg  100 mcg Oral DAILY    gabapentin (NEURONTIN) capsule 100 mg  100 mg Oral QHS    melatonin tablet 6 mg  6 mg Oral QHS    pantoprazole (PROTONIX) tablet 40 mg  40 mg Oral ACB    nitroglycerin (NITROSTAT) tablet 0.4 mg  0.4 mg SubLINGual Q5MIN PRN    polyethylene glycol (MIRALAX) packet 17 g  17 g Oral DAILY PRN    senna-docusate (PERICOLACE) 8.6-50 mg per tablet 1 Tablet  1 Tablet Oral QHS    dilTIAZem ER (CARDIZEM CD) capsule 120 mg  120 mg Oral DAILY    hydrALAZINE (APRESOLINE) tablet 50 mg  50 mg Oral TID     ______________________________________________________________________  EXPECTED LENGTH OF STAY: - - -  ACTUAL LENGTH OF STAY:          [address] [place] [address], DO

## 2022-01-03 NOTE — PROGRESS NOTES
Chart reviewed and attempted to see pt however pt declined therapy at this time citing fatigue. Will f/u as able and appropriate.     Isabel Drake PT, DPT

## 2022-01-03 NOTE — PROGRESS NOTES
Problem: Self Care Deficits Care Plan (Adult)  Goal: *Acute Goals and Plan of Care (Insert Text)  Description:   FUNCTIONAL STATUS PRIOR TO ADMISSION: Patient admitted from [facility] (reports not receiving therapy). Used wheelchair to get around, usually able to transfer without assistance. Set up for ADL including sponge baths    HOME SUPPORT: Lived in SNF    Occupational Therapy Goals  Initiated 1/3/2022  1. Patient will perform grooming with supervision/set-up within 7 day(s). 2.  Patient will perform upper body dressing with supervision/set-up within 7 day(s). 3.  Patient will perform lower body dressing with minimal assistance/contact guard assist within 7 day(s). 4.  Patient will perform all aspects of toileting with supervision/set-up within 7 day(s). 5.  Patient will utilize energy conservation techniques during functional activities with verbal cues within 7 day(s). Outcome: Not Met  OCCUPATIONAL THERAPY EVALUATION  Patient: Kathy Multani (2 y.o. female)  Date: 1/3/2022  Primary Diagnosis: CHF exacerbation ([facility] [address] [I50.9]        Precautions:  Fall    ASSESSMENT  Based on the objective data described below, the patient presents with impaired balance, strength, and ability to complete ADL at baseline. Orientedx4, able to report PLOF including needing supervision for bathing, ADL, and transfers to wheelchair (which she propels herself using BUE/BLE). Completed transfers with overall Min A however Max HR noted to be 130s, deferred further OOB activity for this reason. Returned to bedlevel, left with lunch tray present and all needs in reach, NAD. Patient appears below baseline, and would likely benefit from rehab upon returning to [facility] SNF to maximize ADL independence and safety. Current Level of Function Impacting Discharge (ADLs/self-care): up to Min A bed mobility, CGA/supervision seated ADL    Functional Outcome Measure:   The patient scored Total: 45/100 on the Barthel Index outcome measure which is indicative of 55% impaired ability to care for basic self needs/dependency on others; inferred 50% dependency on others for instrumental ADLs. Other factors to consider for discharge: below baseline     Patient will benefit from skilled therapy intervention to address the above noted impairments. PLAN :  Recommendations and Planned Interventions: self care training, functional mobility training, therapeutic exercise, balance training, therapeutic activities, endurance activities, patient education, home safety training and family training/education    Frequency/Duration: Patient will be followed by occupational therapy 3 times a week to address goals. Recommendation for discharge: (in order for the patient to meet his/her long term goals)  Therapy up to 5 days/week in SNF setting    This discharge recommendation:  Has not yet been discussed the attending provider and/or case management    IF patient discharges home will need the following DME: patient owns DME required for discharge       SUBJECTIVE:   Patient stated I think I'm on the mend.     OBJECTIVE DATA SUMMARY:   HISTORY:   Past Medical History:   Diagnosis Date    Anxiety disorder     [facility]     CAD (coronary artery disease) 2007    stents, CABG x 3v    Carotid stenosis     Cervical stenosis of spinal canal 07/2019    Chronic kidney disease     Cough     CVA (cerebral vascular accident) ([facility] [address] 07/2019    left lacunar infarct at head of caudate    Depression     AND CHRONIC ANXIETY    Diabetes (HCC)     GERD (gastroesophageal reflux disease)     High cholesterol     History of peptic ulcer     Bleeding ulcer with increased NSAID use    Hypertension     Left carotid stenosis 07/2019    s/p left CEA with Dr. Diana Nolen, old 2007    PUD (peptic ulcer disease)     [facility]     Tremor     Valvular heart disease      Past Surgical History:   Procedure Laterality Date    COLONOSCOPY N/A 12/17/2021    COLONOSCOPY   :- performed by Esteban Langford MD at [address] HX CAROTID ENDARTERECTOMY  07/2019    HX CORONARY ARTERY BYPASS GRAFT      HX TONSILLECTOMY  1963    DC CARDIAC SURG PROCEDURE UNLIST      CABG X3 VESSEL    DC TOTAL KNEE ARTHROPLASTY Right 2015       Expanded or extensive additional review of patient history:     Home Situation  Home Environment: Long term care  [address] [facility] [address] Name: [facility]  # Steps to Enter: 0  One/Two Story Residence: One story  Living Alone: No  Support Systems: [facility]  Patient Expects to be Discharged to[de-identified] Long-term care  Current DME Used/Available at Home: Wheelchair,Walker, rolling  Tub or Shower Type: Shower    Hand dominance: Right    EXAMINATION OF PERFORMANCE DEFICITS:  Cognitive/Behavioral Status:  Neurologic State: Alert  Orientation Level: Oriented X4  Cognition: Appropriate decision making; Follows commands  Perception: Appears intact  Perseveration: No perseveration noted  Safety/Judgement: Awareness of environment    Skin: intact    Edema: none ntoed    Hearing: Auditory  Auditory Impairment: None    Vision/Perceptual:                           Acuity: Within Defined Limits         Range of Motion:  AROM: Generally decreased, functional                         Strength:  Strength: Generally decreased, functional                Coordination:  Coordination: Generally decreased, functional  Fine Motor Skills-Upper: Left Intact; Right Intact    Gross Motor Skills-Upper: Right Intact; Left Intact    Tone & Sensation:  Tone: Normal  Sensation: Intact                      Balance:  Sitting: Intact; With support  Sitting - Static: Fair (occasional)  Sitting - Dynamic: Fair (occasional)    Functional Mobility and Transfers for ADLs:  Bed Mobility:  Supine to Sit: Minimum assistance  Sit to Supine: Minimum assistance    Transfers:       ADL Assessment:  Feeding: Setup;Supervision    Oral Facial Hygiene/Grooming: Setup;Supervision    Bathing: Moderate assistance    Upper Body Dressing: Minimum assistance    Lower Body Dressing: Maximum assistance    Toileting: Moderate assistance                ADL Intervention and task modifications:  Feeding  Feeding Assistance: Set-up  Cutting Food: Set-up  Food to Mouth: Set-up  Drink to Mouth: Set-up  Cues: Verbal cues provided    Grooming  Grooming Assistance: Set-up  Position Performed: Seated edge of bed  Washing Face: Set-up  Brushing Teeth: Set-up                             Cognitive Retraining  Safety/Judgement: Awareness of environment    Functional Measure:  Barthel Index:    Bathin  Bladder: 5  Bowels: 10  Groomin  Dressin  Feedin  Mobility: 0  Stairs: 0  Toilet Use: 5  Transfer (Bed to Chair and Back): 10  Total: 45/100        The Barthel ADL Index: Guidelines  1. The index should be used as a record of what a patient does, not as a record of what a patient could do. 2. The main aim is to establish degree of independence from any help, physical or verbal, however minor and for whatever reason. 3. The need for supervision renders the patient not independent. 4. A patient's performance should be established using the best available evidence. Asking the patient, friends/relatives and nurses are the usual sources, but direct observation and common sense are also important. However direct testing is not needed. 5. Usually the patient's performance over the preceding 24-48 hours is important, but occasionally longer periods will be relevant. 6. Middle categories imply that the patient supplies over 50 per cent of the effort. 7. Use of aids to be independent is allowed. Jovani Ardon., Barthel, D.W. (1501). Functional evaluation: the Barthel Index. [address] [facility] (14)2. Rico Masterson isaiah FANTA Mccurdy, Tatum Burks., Jeremias Cramer., Saqib, [address] ().  Measuring the change indisability after inpatient rehabilitation; comparison of the responsiveness of the Barthel Index and Functional Kimball Measure. Journal of Neurology, Neurosurgery, and Psychiatry, 66(4), 372-718. NESSA Treviño, APARNA Ventura, & Ritesh Hughes M.A. (2004.) Assessment of post-stroke quality of life in cost-effectiveness studies: The usefulness of the Barthel Index and the EuroQoL-5D. Quality of Life Research, 15, 731-75         Occupational Therapy Evaluation Charge Determination   History Examination Decision-Making   MEDIUM Complexity : Expanded review of history including physical, cognitive and psychosocial  history  LOW Complexity : 1-3 performance deficits relating to physical, cognitive , or psychosocial skils that result in activity limitations and / or participation restrictions  LOW Complexity : No comorbidities that affect functional and no verbal or physical assistance needed to complete eval tasks       Based on the above components, the patient evaluation is determined to be of the following complexity level: LOW   Pain Rating:  None reported    Activity Tolerance:   Fair and requires rest breaks    After treatment patient left in no apparent distress:    Supine in bed, Call bell within reach and Side rails x 3    COMMUNICATION/EDUCATION:   The patients plan of care was discussed with: Physical therapist and Registered nurse. Home safety education was provided and the patient/caregiver indicated understanding., Patient/family have participated as able in goal setting and plan of care. , and Patient/family agree to work toward stated goals and plan of care. This patients plan of care is appropriate for delegation to [facility].     Thank you for this referral.  Estrella Roberts OT  Time Calculation: 21 mins

## 2022-01-03 NOTE — PROGRESS NOTES
Bedside and Verbal shift change report given to [address] [place] [address] (oncoming nurse) by Benigno Henley RN (offgoing nurse). Report included the following information SBAR, Kardex, Intake/Output, MAR, Accordion, Recent Results and Cardiac Rhythm A fib, PVCs.

## 2022-01-03 NOTE — PROGRESS NOTES
Bedside shift change report given to Lundlisa Dawn 15 (oncoming nurse) by KOFFI Valdez (offgoing nurse). Report included the following information SBAR, Kardex, Procedure Summary, MAR and Recent Results.

## 2022-01-04 LAB
ANION GAP SERPL CALC-SCNC: 8 MMOL/L (ref 5–15)
BUN SERPL-MCNC: 48 MG/DL (ref 6–20)
BUN/CREAT SERPL: 23 (ref 12–20)
CALCIUM SERPL-MCNC: 9.3 MG/DL (ref 8.5–10.1)
CHLORIDE SERPL-SCNC: 103 MMOL/L (ref 97–108)
CO2 SERPL-SCNC: 21 MMOL/L (ref 21–32)
CREAT SERPL-MCNC: 2.11 MG/DL (ref 0.55–1.02)
GLUCOSE SERPL-MCNC: 143 MG/DL (ref 65–100)
MAGNESIUM SERPL-MCNC: 2.3 MG/DL (ref 1.6–2.4)
PHOSPHATE SERPL-MCNC: 4.2 MG/DL (ref 2.6–4.7)
POTASSIUM SERPL-SCNC: 4 MMOL/L (ref 3.5–5.1)
SODIUM SERPL-SCNC: 132 MMOL/L (ref 136–145)

## 2022-01-04 PROCEDURE — 74011250637 HC RX REV CODE- 250/637: Performed by: SPECIALIST

## 2022-01-04 PROCEDURE — 83735 ASSAY OF MAGNESIUM: CPT

## 2022-01-04 PROCEDURE — 74011250637 HC RX REV CODE- 250/637: Performed by: NURSE PRACTITIONER

## 2022-01-04 PROCEDURE — 77010033678 HC OXYGEN DAILY

## 2022-01-04 PROCEDURE — 94760 N-INVAS EAR/PLS OXIMETRY 1: CPT

## 2022-01-04 PROCEDURE — 65660000000 HC RM CCU STEPDOWN

## 2022-01-04 PROCEDURE — 99232 SBSQ HOSP IP/OBS MODERATE 35: CPT | Performed by: SPECIALIST

## 2022-01-04 PROCEDURE — 84100 ASSAY OF PHOSPHORUS: CPT

## 2022-01-04 PROCEDURE — 77030038269 HC DRN EXT URIN PURWCK BARD -A

## 2022-01-04 PROCEDURE — 74011250637 HC RX REV CODE- 250/637: Performed by: HOSPITALIST

## 2022-01-04 PROCEDURE — 80048 BASIC METABOLIC PNL TOTAL CA: CPT

## 2022-01-04 PROCEDURE — 36415 COLL VENOUS BLD VENIPUNCTURE: CPT

## 2022-01-04 PROCEDURE — 74011250637 HC RX REV CODE- 250/637: Performed by: INTERNAL MEDICINE

## 2022-01-04 RX ORDER — METOPROLOL TARTRATE 25 MG/1
25 TABLET, FILM COATED ORAL EVERY 6 HOURS
Status: DISCONTINUED | OUTPATIENT
Start: 2022-01-04 | End: 2022-01-06 | Stop reason: HOSPADM

## 2022-01-04 RX ADMIN — APIXABAN 2.5 MG: 2.5 TABLET, FILM COATED ORAL at 09:04

## 2022-01-04 RX ADMIN — CILOSTAZOL 100 MG: 50 TABLET ORAL at 18:03

## 2022-01-04 RX ADMIN — DOCUSATE SODIUM 50 MG AND SENNOSIDES 8.6 MG 1 TABLET: 8.6; 5 TABLET, FILM COATED ORAL at 22:35

## 2022-01-04 RX ADMIN — BUMETANIDE 1 MG: 1 TABLET ORAL at 18:03

## 2022-01-04 RX ADMIN — CILOSTAZOL 100 MG: 50 TABLET ORAL at 06:33

## 2022-01-04 RX ADMIN — PANTOPRAZOLE SODIUM 40 MG: 40 TABLET, DELAYED RELEASE ORAL at 06:33

## 2022-01-04 RX ADMIN — ATORVASTATIN CALCIUM 40 MG: 40 TABLET, FILM COATED ORAL at 22:35

## 2022-01-04 RX ADMIN — APIXABAN 2.5 MG: 2.5 TABLET, FILM COATED ORAL at 18:03

## 2022-01-04 RX ADMIN — HYDRALAZINE HYDROCHLORIDE 50 MG: 50 TABLET, FILM COATED ORAL at 09:01

## 2022-01-04 RX ADMIN — DULOXETINE HYDROCHLORIDE 120 MG: 60 CAPSULE, DELAYED RELEASE ORAL at 09:04

## 2022-01-04 RX ADMIN — BUSPIRONE HYDROCHLORIDE 5 MG: 5 TABLET ORAL at 09:04

## 2022-01-04 RX ADMIN — CARBIDOPA AND LEVODOPA 2 TABLET: 25; 100 TABLET ORAL at 18:03

## 2022-01-04 RX ADMIN — VITAM B12 100 MCG: 100 TAB at 09:04

## 2022-01-04 RX ADMIN — CARBIDOPA AND LEVODOPA 2 TABLET: 25; 100 TABLET ORAL at 09:04

## 2022-01-04 RX ADMIN — BUMETANIDE 1 MG: 1 TABLET ORAL at 09:04

## 2022-01-04 RX ADMIN — BUSPIRONE HYDROCHLORIDE 5 MG: 5 TABLET ORAL at 18:03

## 2022-01-04 RX ADMIN — HYDRALAZINE HYDROCHLORIDE 50 MG: 50 TABLET, FILM COATED ORAL at 22:35

## 2022-01-04 RX ADMIN — Medication 6 MG: at 22:34

## 2022-01-04 RX ADMIN — ARIPIPRAZOLE 20 MG: 10 TABLET ORAL at 09:04

## 2022-01-04 RX ADMIN — Medication 1000 UNITS: at 09:04

## 2022-01-04 RX ADMIN — METOPROLOL TARTRATE 25 MG: 25 TABLET, FILM COATED ORAL at 13:42

## 2022-01-04 RX ADMIN — DILTIAZEM HYDROCHLORIDE 120 MG: 120 CAPSULE, COATED, EXTENDED RELEASE ORAL at 09:04

## 2022-01-04 RX ADMIN — CLOPIDOGREL BISULFATE 75 MG: 75 TABLET ORAL at 09:01

## 2022-01-04 RX ADMIN — CARBIDOPA AND LEVODOPA 2 TABLET: 25; 100 TABLET ORAL at 13:42

## 2022-01-04 RX ADMIN — CARBIDOPA AND LEVODOPA 2 TABLET: 25; 100 TABLET ORAL at 22:35

## 2022-01-04 RX ADMIN — GABAPENTIN 100 MG: 100 CAPSULE ORAL at 22:35

## 2022-01-04 RX ADMIN — CARVEDILOL 6.25 MG: 6.25 TABLET, FILM COATED ORAL at 09:04

## 2022-01-04 RX ADMIN — BUSPIRONE HYDROCHLORIDE 5 MG: 5 TABLET ORAL at 22:35

## 2022-01-04 NOTE — PROGRESS NOTES
Nephrology Progress Note  Damari Santos  Date of Admission : 12/30/2021    CC: Follow up for MORENO on CKD       Assessment and Plan     MORENO on CKD:  - diff: progressive disease vs CRS vs obstruction  - renal U/S neg for hydro, + for bladder distention  - Cr trending down   - continue Bumex 1 mg PO BID  - daily labs and I/Os    Afib with RVR  - per cards     CKD IIIa:  - baseline Cr around 1.4  - presumed chronic HTN     HTN:  - BP stable     Acute on Chronic HFpEF  CAD, hx of CABG  - ECHO w/ EF 55-60%  - rapid COVID neg  - diuretics as above      Parkinsons  Hx of CVA  PAD w/ L CEA  AAA         Interval History:  Seen and examined. Improved SOB. HR controlled. Cr stable . Denies any cough/SOB. Denies cp, n/v/d    Current Medications: all current  Medications have been eviewed in EPIC  Review of Systems: Pertinent items are noted in HPI. Objective:  Vitals:    Vitals:    01/04/22 0548 01/04/22 0826 01/04/22 0901 01/04/22 1114   BP:  (!) 116/59 110/64    Pulse: (!) 105 (!) 117 98 (!) 110   Resp:  16     Temp:  97.7 °F (36.5 °C)     SpO2:  97%     Weight:       Height:         Intake and Output:  01/04 0701 - 01/04 1900  In: -   Out: 225 [Urine:225]  01/02 1901 - 01/04 0700  In: 922 [P.O.:594]  Out: 5400 [Urine:5400]    Physical Examination:    General: NAD,Conversant   Neck:  Supple, no mass  Resp:  CTA  CV:  Irregular, tachy  GI:  Soft, NT, + Bowel sounds, no hepatosplenomegaly  Neurologic:  Non focal  Psych:             AAO x 3 appropriate affect     []    High complexity decision making was performed  []    Patient is at high-risk of decompensation with multiple organ involvement    Lab Data Personally Reviewed: I have reviewed all the pertinent labs, microbiology data and radiology studies during assessment.     Recent Labs     01/04/22  0034 01/03/22  0344 01/02/22  1200   * 133* 133*   K 4.0 3.7 4.0    102 104   CO2 21 23 24   * 124* 182*   BUN 48* 49* 49*   CREA 2.11* 2.13* 2.35* CA 9.3 8.9 9.0   MG 2.3 2.2  --    PHOS 4.2 4.4 4.0   ALB  --   --  2.9*     Recent Labs     01/03/22  0344   WBC 5.7   HGB 7.5*   HCT 22.7*        No results found for: SDES  Lab Results   Component Value Date/Time    Culture result: MRSA NOT PRESENT 12/10/2021 06:53 AM    Culture result:  12/10/2021 06:53 AM     Screening of patient nares for MRSA is for surveillance purposes and, if positive, to facilitate isolation considerations in high risk settings. It is not intended for automatic decolonization interventions per se as regimens are not sufficiently effective to warrant routine use. Culture result: NO GROWTH 5 DAYS 12/10/2021 04:50 AM     Recent Results (from the past 24 hour(s))   METABOLIC PANEL, BASIC    Collection Time: 01/04/22 12:34 AM   Result Value Ref Range    Sodium 132 (L) 136 - 145 mmol/L    Potassium 4.0 3.5 - 5.1 mmol/L    Chloride 103 97 - 108 mmol/L    CO2 21 21 - 32 mmol/L    Anion gap 8 5 - 15 mmol/L    Glucose 143 (H) 65 - 100 mg/dL    BUN 48 (H) 6 - 20 MG/DL    Creatinine 2.11 (H) 0.55 - 1.02 MG/DL    BUN/Creatinine ratio 23 (H) 12 - 20      GFR est AA 27 (L) >60 ml/min/1.73m2    GFR est non-AA 23 (L) >60 ml/min/1.73m2    Calcium 9.3 8.5 - 10.1 MG/DL   MAGNESIUM    Collection Time: 01/04/22 12:34 AM   Result Value Ref Range    Magnesium 2.3 1.6 - 2.4 mg/dL   PHOSPHORUS    Collection Time: 01/04/22 12:34 AM   Result Value Ref Range    Phosphorus 4.2 2.6 - 4.7 MG/DL                   Adam Beard MD  [address]  Phone - [phone]   Fax - [phone]  [email or web address]

## 2022-01-04 NOTE — PROGRESS NOTES
[address] [place] [address]               Division of Cardiology  [phone]                       Progress note              Malaika Boyce M.D., F.A.C.C. NAME:  Shaheen Llanos   :   1940   MRN:   [id number]         ICD-10-CM ICD-9-CM    1. Acute respiratory failure with hypoxia (HCC)  J96.01 518.81    2. Acute pulmonary edema (HCC)  J81.0 518.4    3. Renal insufficiency  N28.9 593.9    4. Elevated troponin  R77.8 790.6    5. Persistent atrial fibrillation (HCC)  I48.19 427.31    6. Coronary artery disease involving native coronary artery of native heart without angina pectoris  I25.10 414.01                  HPI  [de-identified]years old female history of atrial fibrillation CAD CKD and previous CVA who was admitted for shortness of breath. She feels better today. She denies sob or cp at this time      Assessment/Plan: 1 p.o. atrial fibrillation: She remains in atrial fibrillation but the heart rate is not well controlled at this time. I have attempted to increase her Coreg but there has been no particular change in heart rate. Continue Cardizem p.o. Stop Coreg start metoprolol 25 mg every 6 hours. Continue Eliquis. 2.  CAD: Asymptomatic seemingly. Status post PCI of the LAD in 2021 continue Plavix and Eliquis. Continue Lipitor. 3.  CKD: Closely followed by nephrology on p.o. diuretics. Preserved ejection fraction by recent echocardiogram.           CARDIAC STUDIES      21    ECHO ADULT COMPLETE 2021    Interpretation Summary    Left Ventricle: Left ventricle size is normal. Normal wall thickness. Normal wall motion. Normal left ventricular systolic function with a visually estimated EF of 55 - 60%.   Aortic Valve: Mild stenosis. Mild transvalvular regurgitation. Mean gradient of 14 mmhg.   Mitral Valve: Moderate mitral annular calcification.  Mild to moderate transvalvular regurgitation.   Pulmonary Arteries: Mild pulmonary hypertension present. Estimated PA pressure of 45 mmHg.   Pericardium:  No pericardial effusion. Left pleural effusion. Signed by: Nitesh Crain MD on 12/31/2021 12:11 PM      04/30/20    NUCLEAR CARDIAC STRESS TEST 05/05/2020, 05/05/2020 5/5/2020    Interpretation Summary  · Baseline ECG: Normal sinus rhythm, non-specific ST-T wave abnormalities. · NM: No ischemia. Possible small distal anterior infarct. LVEF 53%. Rest and Lexiscan myocardial perfusion SPECT imaging is performed with IV injections of 10.6 and 32.9 mCi technetium 99m Sestamibi respectively. SPECT images displayed in three planes show no ischemic reversibility. There is a small focus of distal anterior wall fixed thinning, possible infarct. Gated SPECT images reviewed in 3 planes show unremarkable LV systolic wall thickening. There is no segmental wall motion abnormality of the left ventricular myocardium. The calculated LV ejection fraction is 53%. Pulmonary uptake and left ventricular cavity size appear normal.    Impression  : No ischemia. Possible small distal anterior infarct. LVEF 53%. Signed by: Lillian Saez MD on 5/5/2020 10:26 AM, Signed by: Boo Acosta MD on 5/5/2020 12:11 PM      03/29/21    INVASIVE VASCULAR PROCEDURE 04/05/2021 4/5/2021  CARDIAC PROCEDURE 04/05/2021 4/5/2021    Conclusion  Results: After angiography 60 to 70% stenosis of ostium of right renal artery was noted. On IVUS imaging the minimal luminal dimensions were 4 x 2.1 mm with MLA of 8 mm². Left renal angiogram demonstrated 50 to 60% angiographic stenosis which on IVUS appeared to be even less prominent with minimal luminal dimensions of 4.2 x 3.7 mm. Conclusion: Moderate to severe left renal artery stenosis and mild to moderate right renal arterial stenosis. Both stenotic lesions are ostial and atherosclerotic in etiology.     Signed by: Hasmukh August MD on 4/5/2021 10:00 AM       @[email or web address]      IMAGING      CT Results (most recent):  Results from Hospital Encounter encounter on 10/29/21    CT ABD PELV WO CONT    Narrative  EXAM: CT ABD PELV WO CONT    INDICATION: abd pain and tenderness, fevers    COMPARISON: 4/23/2021    CONTRAST:  None. TECHNIQUE:  Thin axial images were obtained through the abdomen and pelvis. Coronal and  sagittal reformats were generated. Oral contrast was not administered. CT dose  reduction was achieved through use of a standardized protocol tailored for this  examination and automatic exposure control for dose modulation. The absence of intravenous contrast material reduces the sensitivity for  evaluation of the vasculature and solid organs. FINDINGS:  LOWER THORAX: No significant abnormality in the incidentally imaged lower chest.  LIVER: No mass. BILIARY TREE: Gallstones are noted. CBD is not dilated. SPLEEN: within normal limits. PANCREAS: No focal abnormality. ADRENALS: Unremarkable. KIDNEYS/URETERS: Unchanged bilateral renal cysts, no follow-up required. No  renal or ureteral stone or evidence of hydronephrosis. STOMACH: Unremarkable. SMALL BOWEL: No dilatation or wall thickening. COLON: No dilatation or wall thickening. APPENDIX: Not visualized. PERITONEUM: No ascites or pneumoperitoneum. RETROPERITONEUM: Unchanged 4.3 cm infrarenal abdominal aortic aneurysm. REPRODUCTIVE ORGANS: There is no pelvic mass or lymphadenopathy. URINARY BLADDER: No mass or calculus. BONES: Degenerative changes are seen in the lumbar spine. ABDOMINAL WALL: No mass or hernia. ADDITIONAL COMMENTS: N/A    Impression  Cholelithiasis. No acute abnormality.       XR Results (most recent):  Results from Hospital Encounter encounter on 12/30/21    XR CHEST PORT    Narrative  EXAM:  XR CHEST PORT    INDICATION:  Evaluate infiltrates    COMPARISON: 12/30/2021    TECHNIQUE: AP portable upright chest view    FINDINGS: Improved bilateral airspace opacities compared to the prior  radiograph. Coarse interstitial markings are still present in the lower lobes  bilaterally. No pleural effusion or pneumothorax. Heart size is normal. Osseous  structures are unchanged. Impression  Improved coarse interstitial opacities bilaterally. MRI Results (most recent):          Past Medical History:   Diagnosis Date    Anxiety disorder     Atrial fibrillation ([facility] [address] [facility]     CAD (coronary artery disease) 2007    stents, CABG x 3v    Carotid stenosis     Cervical stenosis of spinal canal 07/2019    Chronic kidney disease     Cough     CVA (cerebral vascular accident) ([address] 07/2019    left lacunar infarct at head of caudate    Depression     AND CHRONIC ANXIETY    Diabetes (HCC)     GERD (gastroesophageal reflux disease)     High cholesterol     History of peptic ulcer     Bleeding ulcer with increased NSAID use    Hypertension     Left carotid stenosis 07/2019    s/p left CEA with Dr. Abimbola Topete, old 2007    PUD (peptic ulcer disease)     Stroke ([facility] [facility]     Tremor     Valvular heart disease            Past Surgical History:   Procedure Laterality Date    COLONOSCOPY N/A 12/17/2021    COLONOSCOPY   :- performed [facility] at [facility] ENDOSCOPY    HX CAROTID ENDARTERECTOMY  07/2019    HX CORONARY ARTERY BYPASS GRAFT      HX TONSILLECTOMY  1963    MD CARDIAC SURG PROCEDURE UNLIST      CABG X3 VESSEL    MD TOTAL KNEE ARTHROPLASTY Right 2015               Visit Vitals  BP (!) 110/51 (BP 1 Location: Right upper arm, BP Patient Position: Lying)   Pulse (!) 129   Temp 98.3 °F (36.8 °C)   Resp 21   Ht 5' 5\" (1.651 m)   Wt 163 lb 12.8 oz (74.3 kg)   SpO2 97%   BMI 27.26 kg/m²         Wt Readings from Last 3 Encounters:   01/04/22 163 lb 12.8 oz (74.3 kg)   12/18/21 173 lb 8 oz (78.7 kg)   11/03/21 182 lb 8.7 oz (82.8 kg)         Review of Systems:   Pertinent items are noted in the History of Present Illness.          01/04 0701 - 01/04 1900  In: -   Out: 225 [Urine:225]    01/02 1901 - 01/04 0700  In: 520 [P.O.:594]  Out: 5400 [Urine:5400]      Telemetry: AFIB    Physical Exam:    Neck: no JVD  Heart: irregularly irregular rhythm  Lungs: diminished breath sounds R anterior, L anterior  Abdomen: soft, non-tender.  Bowel sounds normal. No masses,  no organomegaly  Extremities: no edema    Current Facility-Administered Medications   Medication Dose Route Frequency    bumetanide (BUMEX) tablet 1 mg  1 mg Oral BID    carvediloL (COREG) tablet 6.25 mg  6.25 mg Oral BID WITH MEALS    apixaban (ELIQUIS) tablet 2.5 mg  2.5 mg Oral BID    ARIPiprazole (ABILIFY) tablet 20 mg  20 mg Oral DAILY    atorvastatin (LIPITOR) tablet 40 mg  40 mg Oral QHS    busPIRone (BUSPAR) tablet 5 mg  5 mg Oral TID    carbidopa-levodopa (SINEMET)  mg per tablet 2 Tablet  2 Tablet Oral QID    cholecalciferol (VITAMIN D3) (1000 Units /25 mcg) tablet 1,000 Units  1,000 Units Oral DAILY    cilostazoL (PLETAL) tablet 100 mg  100 mg Oral ACB&D    clopidogreL (PLAVIX) tablet 75 mg  75 mg Oral DAILY AFTER BREAKFAST    DULoxetine (CYMBALTA) capsule 120 mg  120 mg Oral DAILY    cyanocobalamin (VITAMIN B12) tablet 100 mcg  100 mcg Oral DAILY    gabapentin (NEURONTIN) capsule 100 mg  100 mg Oral QHS    melatonin tablet 6 mg  6 mg Oral QHS    pantoprazole (PROTONIX) tablet 40 mg  40 mg Oral ACB    nitroglycerin (NITROSTAT) tablet 0.4 mg  0.4 mg SubLINGual Q5MIN PRN    polyethylene glycol (MIRALAX) packet 17 g  17 g Oral DAILY PRN    senna-docusate (PERICOLACE) 8.6-50 mg per tablet 1 Tablet  1 Tablet Oral QHS    dilTIAZem ER (CARDIZEM CD) capsule 120 mg  120 mg Oral DAILY    hydrALAZINE (APRESOLINE) tablet 50 mg  50 mg Oral TID         All Cardiac Markers in the last 24 hours:  No results found for: CPK, CK, CKMMB, CKMB, RCK3, CKMBT, CKMBPOC, CKNDX, CKND1, MONSTER, TROPT, TROIQ, ANABELLE, TROPT, TNIPOC, BNP, BNPP, BNPNT     Lab Results   Component Value Date/Time    Creatinine (POC) 1.6 (H) 02/24/2020 10:32 AM    Creatinine 2.11 (H) 01/04/2022 12:34 AM          Lab Results   Component Value Date/Time    CK 75 07/11/2019 09:35 AM    CK - MB 2.7 07/11/2019 09:35 AM    CK-MB Index 3.6 (H) 07/11/2019 09:35 AM    Troponin-I, Qt. 0.05 (H) 04/24/2021 04:54 AM     (H) 06/08/2014 04:12 AM         Lab Results   Component Value Date/Time    WBC 5.7 01/03/2022 03:44 AM    HGB 7.5 (L) 01/03/2022 03:44 AM    HCT 22.7 (L) 01/03/2022 03:44 AM    PLATELET 429 45/97/4261 03:44 AM    MCV 99.6 (H) 01/03/2022 03:44 AM         Lab Results   Component Value Date/Time    Sodium 132 (L) 01/04/2022 12:34 AM    Potassium 4.0 01/04/2022 12:34 AM    Chloride 103 01/04/2022 12:34 AM    CO2 21 01/04/2022 12:34 AM    Anion gap 8 01/04/2022 12:34 AM    Glucose 143 (H) 01/04/2022 12:34 AM    BUN 48 (H) 01/04/2022 12:34 AM    Creatinine 2.11 (H) 01/04/2022 12:34 AM    BUN/Creatinine ratio 23 (H) 01/04/2022 12:34 AM    GFR est AA 27 (L) 01/04/2022 12:34 AM    GFR est non-AA 23 (L) 01/04/2022 12:34 AM    Calcium 9.3 01/04/2022 12:34 AM         Lab Results   Component Value Date/Time     (H) 06/08/2014 04:12 AM     (H) 06/04/2014 04:44 AM    NT pro-BNP 13,944 (H) 01/03/2022 03:44 AM    NT pro-BNP 13,027 (H) 01/01/2022 05:33 AM    NT pro-BNP 17,648 (H) 12/30/2021 08:45 AM    NT pro-BNP 8,184 (H) 12/19/2021 03:17 AM    NT pro-BNP 19,048 (H) 12/12/2021 04:43 AM         Lab Results   Component Value Date/Time    Cholesterol, total 148 09/23/2020 04:27 AM    HDL Cholesterol 52 09/23/2020 04:27 AM    LDL, calculated 83.8 09/23/2020 04:27 AM    VLDL, calculated 12.2 09/23/2020 04:27 AM    Triglyceride 61 09/23/2020 04:27 AM    CHOL/HDL Ratio 2.8 09/23/2020 04:27 AM         Lab Results   Component Value Date/Time    ALT (SGPT) 9 (L) 12/31/2021 01:13 AM    Alk.  phosphatase 87 12/31/2021 01:13 AM    Bilirubin, total 0.8 12/31/2021 01:13 AM

## 2022-01-04 NOTE — PROGRESS NOTES
Bedside and Verbal shift change report given to Eliezer Head (oncoming nurse) by Deandre Myers RN (offgoing nurse). Report included the following information SBAR, Kardex, Intake/Output, MAR, Accordion and Recent Results.

## 2022-01-04 NOTE — PROGRESS NOTES
Physical therapy:    Chart reviewed in prep for PT session. Noted patient with resting -122 bpm. Will defer therapy at this time as pt is not medically appropriate for mobilization. Will continue to follow.  Thank you    Torres Darden, PT, DPT

## 2022-01-04 NOTE — PROGRESS NOTES
[address]  Hospitalist Group                                                                                          Hospitalist Progress Note  [address] [facility],   Answering service: [phone] from in house phone        Date of Service:  2022  NAME:  Seth Parikh  :  1940  MRN:  [id number]      Admission Summary:   Seth Parikh is a [de-identified] y.o. female who presents with sob   MsRenny Galaviz is an 60-year-old female with H/o afib, CAD, CKD 3, and CVA who presents to the ER with complaints of shortness of breath. Per EMS report, her symptoms started several hours prior to arrival. Per their report, she is on 2 L of home oxygen. Her oxygen saturations on her home 2 L were 81%. . She was placed on a nonrebreather with oxygen saturations in the mid 90s. She had reported some chest pain earlier. . She denies cough, however, she is coughing during the exam. She is not sure if she has been vaccinated for COVID-19,  She denies any leg swelling. She denies any other complaints. She was given bumex 1mg iv one dose in ER. Also gave one dose of IV levaquin and zosyn in ER  Currently pt Feels improved.        Interval history / Subjective: Follow-up heart failure exacerbation. Patient seen and examined. Resting. Remains in A-fib with elevated HR. Breathing appears to have stabilized. Assessment & Plan:     Acute on chronic respiratory failure with hypoxia: improving - on 2 liters   Acute on chronic diastolic congestive heart failure:  -Suspected heart failure exacerbation  -Continue diuresis, strict I's and O's, daily weights  -Appreciate cardiology and nephrology assistance  -continue bumex BID- transitioned to oral   -CXR 1/2 with improved bilateral infiltrates   -Echo with normal EF    Atrial fibrillation with RVR: persistent   -Continue Eliquis, diltiazem.  Carvedilol changed to metoprolol   -dose adjustments per cardiology    MORENO on CKD stage III:  -Nephrology following, appreciate recommendations  -Renal ultrasound consistent with renal medical parenchymal disease   -unable to place pittman catheter     CAD s/p CABG:  -Continue home statin, Plavix    Hypertension:  -Continue carvedilol, diltiazem, hydralazine    Hypokalemia: Repleted    Anemia, microcytic: monitor closely     PT/OT    Code status: DNR  DVT prophylaxis: Ravin Lewis 4873 discussed with: Patient/Family  Anticipated Disposition: SNF  Anticipated Discharge: Greater than 48 hours     Hospital Problems  Date Reviewed: 1/1/2022          Codes Class Noted POA    CHF exacerbation ([facility] [address] ICD-10-CM: I50.9  ICD-9-CM: 428.0  9/25/2020 Unknown                Review of Systems:   Negative unless stated above      Vital Signs:    Last 24hrs VS reviewed since prior progress note. Most recent are:  Visit Vitals  /62   Pulse (!) 127   Temp 98.3 °F (36.8 °C)   Resp 21   Ht 5' 5\" (1.651 m)   Wt 74.3 kg (163 lb 12.8 oz)   SpO2 97%   BMI 27.26 kg/m²         Intake/Output Summary (Last 24 hours) at 1/4/2022 1425  Last data filed at 1/4/2022 1121  Gross per 24 hour   Intake 240 ml   Output 2725 ml   Net -2485 ml        Physical Examination:     I had a face to face encounter with this patient and independently examined them on 1/4/2022 as outlined below:          Constitutional:  No acute distress, cooperative, pleasant    ENT:  Oral mucosa moist, oropharynx benign. Resp:  diminished bilaterally. No accessory muscle use   CV:  Tachycardic, irregularly irregular, no murmurs, gallops, rubs    GI:  Soft, non distended, non tender.  normoactive bowel sounds, no hepatosplenomegaly     Musculoskeletal:  No edema, blisters bilaterally, warm, 2+ pulses throughout    Neurologic:  Moves all extremities            Data Review:    Review and/or order of clinical lab test  Review and/or order of tests in the radiology section of CPT  Review and/or order of tests in the medicine section of CPT      Labs:     Recent Labs 01/03/22  0344   WBC 5.7   HGB 7.5*   HCT 22.7*        Recent Labs     01/04/22  0034 01/03/22  0344 01/02/22  1200   * 133* 133*   K 4.0 3.7 4.0    102 104   CO2 21 23 24   BUN 48* 49* 49*   CREA 2.11* 2.13* 2.35*   * 124* 182*   CA 9.3 8.9 9.0   MG 2.3 2.2  --    PHOS 4.2 4.4 4.0     Recent Labs     01/02/22  1200   ALB 2.9*     No results for input(s): INR, PTP, APTT, INREXT, INREXT in the last 72 hours. No results for input(s): FE, TIBC, PSAT, FERR in the last 72 hours. Lab Results   Component Value Date/Time    Folate 7.8 12/14/2021 04:40 AM      No results for input(s): PH, PCO2, PO2 in the last 72 hours. No results for input(s): CPK, CKNDX, TROIQ in the last 72 hours.     No lab exists for component: CPKMB  Lab Results   Component Value Date/Time    Cholesterol, total 148 09/23/2020 04:27 AM    HDL Cholesterol 52 09/23/2020 04:27 AM    LDL, calculated 83.8 09/23/2020 04:27 AM    Triglyceride 61 09/23/2020 04:27 AM    CHOL/HDL Ratio 2.8 09/23/2020 04:27 AM     Lab Results   Component Value Date/Time    Glucose (POC) 108 12/21/2021 06:15 AM    Glucose (POC) 148 (H) 12/20/2021 09:54 PM    Glucose (POC) 156 (H) 12/18/2021 11:53 AM    Glucose (POC) 96 12/18/2021 10:18 AM    Glucose (POC) 120 (H) 04/24/2021 04:54 PM     Lab Results   Component Value Date/Time    Color YELLOW/STRAW 12/30/2021 08:23 PM    Appearance CLOUDY (A) 12/30/2021 08:23 PM    Specific gravity 1.017 12/30/2021 08:23 PM    pH (UA) 6.0 12/30/2021 08:23 PM    Protein 100 (A) 12/30/2021 08:23 PM    Glucose Negative 12/30/2021 08:23 PM    Ketone Negative 12/30/2021 08:23 PM    Bilirubin Negative 12/30/2021 08:23 PM    Urobilinogen 1.0 12/30/2021 08:23 PM    Nitrites Negative 12/30/2021 08:23 PM    Leukocyte Esterase MODERATE (A) 12/30/2021 08:23 PM    Epithelial cells FEW 12/30/2021 08:23 PM    Bacteria 1+ (A) 12/30/2021 08:23 PM    WBC 0-4 12/30/2021 08:23 PM    RBC 0-5 12/30/2021 08:23 PM         Medications Reviewed: Current Facility-Administered Medications   Medication Dose Route Frequency    metoprolol tartrate (LOPRESSOR) tablet 25 mg  25 mg Oral Q6H    bumetanide (BUMEX) tablet 1 mg  1 mg Oral BID    apixaban (ELIQUIS) tablet 2.5 mg  2.5 mg Oral BID    ARIPiprazole (ABILIFY) tablet 20 mg  20 mg Oral DAILY    atorvastatin (LIPITOR) tablet 40 mg  40 mg Oral QHS    busPIRone (BUSPAR) tablet 5 mg  5 mg Oral TID    carbidopa-levodopa (SINEMET)  mg per tablet 2 Tablet  2 Tablet Oral QID    cholecalciferol (VITAMIN D3) (1000 Units /25 mcg) tablet 1,000 Units  1,000 Units Oral DAILY    cilostazoL (PLETAL) tablet 100 mg  100 mg Oral ACB&D    clopidogreL (PLAVIX) tablet 75 mg  75 mg Oral DAILY AFTER BREAKFAST    DULoxetine (CYMBALTA) capsule 120 mg  120 mg Oral DAILY    cyanocobalamin (VITAMIN B12) tablet 100 mcg  100 mcg Oral DAILY    gabapentin (NEURONTIN) capsule 100 mg  100 mg Oral QHS    melatonin tablet 6 mg  6 mg Oral QHS    pantoprazole (PROTONIX) tablet 40 mg  40 mg Oral ACB    nitroglycerin (NITROSTAT) tablet 0.4 mg  0.4 mg SubLINGual Q5MIN PRN    polyethylene glycol (MIRALAX) packet 17 g  17 g Oral DAILY PRN    senna-docusate (PERICOLACE) 8.6-50 mg per tablet 1 Tablet  1 Tablet Oral QHS    dilTIAZem ER (CARDIZEM CD) capsule 120 mg  120 mg Oral DAILY    hydrALAZINE (APRESOLINE) tablet 50 mg  50 mg Oral TID     ______________________________________________________________________  EXPECTED LENGTH OF STAY: 3d 19h  ACTUAL LENGTH OF STAY:          [address] [place],

## 2022-01-04 NOTE — PROGRESS NOTES
Transition of Care  1. RUR 28% High  2. Disposition Return to [facility]  3. DME NA  4. Transportation BLS  5. Follow Up PCP, Specialist      CM continuing to follow for transitional care planning needs. Patient is planning to return to [facility] at [facility]. Return referral completed via [facility]clink. Nephrology and Cardiology following. CM to initiate auth with [employer] closer to [place]. CM to monitor.      Chong Storm MS

## 2022-01-05 LAB
ALBUMIN SERPL-MCNC: 3.2 G/DL (ref 3.5–5)
ANION GAP SERPL CALC-SCNC: 9 MMOL/L (ref 5–15)
BUN SERPL-MCNC: 47 MG/DL (ref 6–20)
BUN/CREAT SERPL: 22 (ref 12–20)
CALCIUM SERPL-MCNC: 9.2 MG/DL (ref 8.5–10.1)
CHLORIDE SERPL-SCNC: 104 MMOL/L (ref 97–108)
CO2 SERPL-SCNC: 23 MMOL/L (ref 21–32)
CREAT SERPL-MCNC: 2.12 MG/DL (ref 0.55–1.02)
ERYTHROCYTE [DISTWIDTH] IN BLOOD BY AUTOMATED COUNT: 13.8 % (ref 11.5–14.5)
GLUCOSE SERPL-MCNC: 133 MG/DL (ref 65–100)
HCT VFR BLD AUTO: 22.9 % (ref 35–47)
HGB BLD-MCNC: 7.3 G/DL (ref 11.5–16)
MAGNESIUM SERPL-MCNC: 2.3 MG/DL (ref 1.6–2.4)
MCH RBC QN AUTO: 32.3 PG (ref 26–34)
MCHC RBC AUTO-ENTMCNC: 31.9 G/DL (ref 30–36.5)
MCV RBC AUTO: 101.3 FL (ref 80–99)
NRBC # BLD: 0 K/UL (ref 0–0.01)
NRBC BLD-RTO: 0 PER 100 WBC
PHOSPHATE SERPL-MCNC: 4.8 MG/DL (ref 2.6–4.7)
PLATELET # BLD AUTO: 275 K/UL (ref 150–400)
PMV BLD AUTO: 8.8 FL (ref 8.9–12.9)
POTASSIUM SERPL-SCNC: 4 MMOL/L (ref 3.5–5.1)
RBC # BLD AUTO: 2.26 M/UL (ref 3.8–5.2)
SODIUM SERPL-SCNC: 136 MMOL/L (ref 136–145)
WBC # BLD AUTO: 4.2 K/UL (ref 3.6–11)

## 2022-01-05 PROCEDURE — 74011250637 HC RX REV CODE- 250/637: Performed by: NURSE PRACTITIONER

## 2022-01-05 PROCEDURE — 94760 N-INVAS EAR/PLS OXIMETRY 1: CPT

## 2022-01-05 PROCEDURE — 97530 THERAPEUTIC ACTIVITIES: CPT | Performed by: PHYSICAL THERAPIST

## 2022-01-05 PROCEDURE — 77010033678 HC OXYGEN DAILY

## 2022-01-05 PROCEDURE — 36415 COLL VENOUS BLD VENIPUNCTURE: CPT

## 2022-01-05 PROCEDURE — 83735 ASSAY OF MAGNESIUM: CPT

## 2022-01-05 PROCEDURE — 80069 RENAL FUNCTION PANEL: CPT

## 2022-01-05 PROCEDURE — 77030038269 HC DRN EXT URIN PURWCK BARD -A

## 2022-01-05 PROCEDURE — 97535 SELF CARE MNGMENT TRAINING: CPT

## 2022-01-05 PROCEDURE — 74011250637 HC RX REV CODE- 250/637: Performed by: INTERNAL MEDICINE

## 2022-01-05 PROCEDURE — 74011250637 HC RX REV CODE- 250/637: Performed by: SPECIALIST

## 2022-01-05 PROCEDURE — 99232 SBSQ HOSP IP/OBS MODERATE 35: CPT | Performed by: SPECIALIST

## 2022-01-05 PROCEDURE — 74011250637 HC RX REV CODE- 250/637: Performed by: HOSPITALIST

## 2022-01-05 PROCEDURE — 85027 COMPLETE CBC AUTOMATED: CPT

## 2022-01-05 PROCEDURE — 65660000000 HC RM CCU STEPDOWN

## 2022-01-05 RX ORDER — BUMETANIDE 1 MG/1
1 TABLET ORAL DAILY
Status: DISCONTINUED | OUTPATIENT
Start: 2022-01-06 | End: 2022-01-06 | Stop reason: HOSPADM

## 2022-01-05 RX ADMIN — ATORVASTATIN CALCIUM 40 MG: 40 TABLET, FILM COATED ORAL at 21:22

## 2022-01-05 RX ADMIN — HYDRALAZINE HYDROCHLORIDE 50 MG: 50 TABLET, FILM COATED ORAL at 09:12

## 2022-01-05 RX ADMIN — HYDRALAZINE HYDROCHLORIDE 50 MG: 50 TABLET, FILM COATED ORAL at 16:46

## 2022-01-05 RX ADMIN — CILOSTAZOL 100 MG: 50 TABLET ORAL at 16:46

## 2022-01-05 RX ADMIN — METOPROLOL TARTRATE 25 MG: 25 TABLET, FILM COATED ORAL at 12:34

## 2022-01-05 RX ADMIN — METOPROLOL TARTRATE 25 MG: 25 TABLET, FILM COATED ORAL at 17:54

## 2022-01-05 RX ADMIN — ARIPIPRAZOLE 20 MG: 10 TABLET ORAL at 09:52

## 2022-01-05 RX ADMIN — DILTIAZEM HYDROCHLORIDE 120 MG: 120 CAPSULE, COATED, EXTENDED RELEASE ORAL at 09:11

## 2022-01-05 RX ADMIN — Medication 6 MG: at 21:22

## 2022-01-05 RX ADMIN — VITAM B12 100 MCG: 100 TAB at 09:11

## 2022-01-05 RX ADMIN — Medication 1000 UNITS: at 09:11

## 2022-01-05 RX ADMIN — BUMETANIDE 1 MG: 1 TABLET ORAL at 09:12

## 2022-01-05 RX ADMIN — CILOSTAZOL 100 MG: 50 TABLET ORAL at 06:57

## 2022-01-05 RX ADMIN — CARBIDOPA AND LEVODOPA 2 TABLET: 25; 100 TABLET ORAL at 12:34

## 2022-01-05 RX ADMIN — BUSPIRONE HYDROCHLORIDE 5 MG: 5 TABLET ORAL at 09:11

## 2022-01-05 RX ADMIN — APIXABAN 2.5 MG: 2.5 TABLET, FILM COATED ORAL at 09:11

## 2022-01-05 RX ADMIN — PANTOPRAZOLE SODIUM 40 MG: 40 TABLET, DELAYED RELEASE ORAL at 06:57

## 2022-01-05 RX ADMIN — CARBIDOPA AND LEVODOPA 2 TABLET: 25; 100 TABLET ORAL at 21:22

## 2022-01-05 RX ADMIN — CARBIDOPA AND LEVODOPA 2 TABLET: 25; 100 TABLET ORAL at 17:54

## 2022-01-05 RX ADMIN — DULOXETINE HYDROCHLORIDE 120 MG: 60 CAPSULE, DELAYED RELEASE ORAL at 09:11

## 2022-01-05 RX ADMIN — DOCUSATE SODIUM 50 MG AND SENNOSIDES 8.6 MG 1 TABLET: 8.6; 5 TABLET, FILM COATED ORAL at 21:22

## 2022-01-05 RX ADMIN — CARBIDOPA AND LEVODOPA 2 TABLET: 25; 100 TABLET ORAL at 09:11

## 2022-01-05 RX ADMIN — CLOPIDOGREL BISULFATE 75 MG: 75 TABLET ORAL at 09:12

## 2022-01-05 RX ADMIN — BUSPIRONE HYDROCHLORIDE 5 MG: 5 TABLET ORAL at 21:22

## 2022-01-05 RX ADMIN — BUSPIRONE HYDROCHLORIDE 5 MG: 5 TABLET ORAL at 16:46

## 2022-01-05 RX ADMIN — APIXABAN 2.5 MG: 2.5 TABLET, FILM COATED ORAL at 17:54

## 2022-01-05 RX ADMIN — GABAPENTIN 100 MG: 100 CAPSULE ORAL at 21:22

## 2022-01-05 NOTE — PROGRESS NOTES
[address] [place] [address]               Division of Cardiology  [phone]                       Progress note              Graham Pastor M.D., F.A.C.C. NAME:  Cathy Epperson   :   1940   MRN:   [id number]         ICD-10-CM ICD-9-CM    1. Acute respiratory failure with hypoxia (HCC)  J96.01 518.81    2. Acute pulmonary edema (HCC)  J81.0 518.4    3. Renal insufficiency  N28.9 593.9    4. Elevated troponin  R77.8 790.6    5. Persistent atrial fibrillation (HCC)  I48.19 427.31    6. Coronary artery disease involving native coronary artery of native heart without angina pectoris  I25.10 414.01                  HPI  [de-identified]years old female history of atrial fibrillation CAD CKD and previous CVA who was admitted for shortness of breath.  She feels better today. She denies sob or cp at this time      Assessment/Plan: 1. Atrial fibrillation: She remains in atrial fibrillation the heart rate is now controlled. Continue p.o. Cardizem. Metoprolol started yesterday. May change to 50 mg twice daily upon discharge. Continue Eliquis. 2.  CAD: She remains seemingly asymptomatic. Status post PCI of the LAD in 2021 continue Plavix. Not on aspirin of course on account effect results on Eliquis. Continue Eliquis and Lipitor. 3.  CKD: D: Closely followed by nephrology on p.o. diuretics she seems clinically compensated at this time she denies any shortness of breath. Recent ejection fraction by echo of 2021 was normal at 55 to 60% with mild aortic stenosis and mild to moderate mitral regurgitation. No additional cardiac intervention at this time. The patient to follow-up with Dr. Yasmine Marroquin her regular cardiologist upon discharge.            CARDIAC STUDIES      21    ECHO ADULT COMPLETE 2021    Interpretation Summary    Left Ventricle: Left ventricle size is normal. Normal wall thickness. Normal wall motion. Normal left ventricular systolic function with a visually estimated EF of 55 - 60%.   Aortic Valve: Mild stenosis. Mild transvalvular regurgitation. Mean gradient of 14 mmhg.   Mitral Valve: Moderate mitral annular calcification. Mild to moderate transvalvular regurgitation.   Pulmonary Arteries: Mild pulmonary hypertension present. Estimated PA pressure of 45 mmHg.   Pericardium:  No pericardial effusion. Left pleural effusion. Signed by: Cameron Lynch MD on 12/31/2021 12:11 PM      04/30/20    NUCLEAR CARDIAC STRESS TEST 05/05/2020, 05/05/2020 5/5/2020    Interpretation Summary  · Baseline ECG: Normal sinus rhythm, non-specific ST-T wave abnormalities. · NM: No ischemia. Possible small distal anterior infarct. LVEF 53%. Rest and Lexiscan myocardial perfusion SPECT imaging is performed with IV injections of 10.6 and 32.9 mCi technetium 99m Sestamibi respectively. SPECT images displayed in three planes show no ischemic reversibility. There is a small focus of distal anterior wall fixed thinning, possible infarct. Gated SPECT images reviewed in 3 planes show unremarkable LV systolic wall thickening. There is no segmental wall motion abnormality of the left ventricular myocardium. The calculated LV ejection fraction is 53%. Pulmonary uptake and left ventricular cavity size appear normal.    Impression  : No ischemia. Possible small distal anterior infarct. LVEF 53%. Signed by: Melia Mcgovern MD on 5/5/2020 10:26 AM, Signed by: Khoa Ramírez MD on 5/5/2020 12:11 PM      03/29/21    INVASIVE VASCULAR PROCEDURE 04/05/2021 4/5/2021  CARDIAC PROCEDURE 04/05/2021 4/5/2021    Conclusion  Results: After angiography 60 to 70% stenosis of ostium of right renal artery was noted. On IVUS imaging the minimal luminal dimensions were 4 x 2.1 mm with MLA of 8 mm².   Left renal angiogram demonstrated 50 to 60% angiographic stenosis which on IVUS appeared to be even less prominent with minimal luminal dimensions of 4.2 x 3.7 mm. Conclusion: Moderate to severe left renal artery stenosis and mild to moderate right renal arterial stenosis. Both stenotic lesions are ostial and atherosclerotic in etiology. Signed by: Ronnie Hester MD on 4/5/2021 10:00 AM       @[email or web address]      IMAGING      CT Results (most recent):  Results from Hospital Encounter encounter on 10/29/21    CT ABD PELV WO CONT    Narrative  EXAM: CT ABD PELV WO CONT    INDICATION: abd pain and tenderness, fevers    COMPARISON: 4/23/2021    CONTRAST:  None. TECHNIQUE:  Thin axial images were obtained through the abdomen and pelvis. Coronal and  sagittal reformats were generated. Oral contrast was not administered. CT dose  reduction was achieved through use of a standardized protocol tailored for this  examination and automatic exposure control for dose modulation. The absence of intravenous contrast material reduces the sensitivity for  evaluation of the vasculature and solid organs. FINDINGS:  LOWER THORAX: No significant abnormality in the incidentally imaged lower chest.  LIVER: No mass. BILIARY TREE: Gallstones are noted. CBD is not dilated. SPLEEN: within normal limits. PANCREAS: No focal abnormality. ADRENALS: Unremarkable. KIDNEYS/URETERS: Unchanged bilateral renal cysts, no follow-up required. No  renal or ureteral stone or evidence of hydronephrosis. STOMACH: Unremarkable. SMALL BOWEL: No dilatation or wall thickening. COLON: No dilatation or wall thickening. APPENDIX: Not visualized. PERITONEUM: No ascites or pneumoperitoneum. RETROPERITONEUM: Unchanged 4.3 cm infrarenal abdominal aortic aneurysm. REPRODUCTIVE ORGANS: There is no pelvic mass or lymphadenopathy. URINARY BLADDER: No mass or calculus. BONES: Degenerative changes are seen in the lumbar spine. ABDOMINAL WALL: No mass or hernia. ADDITIONAL COMMENTS: N/A    Impression  Cholelithiasis. No acute abnormality.       XR Results (most recent):  Results from [place] encounter on 12/30/21    XR CHEST PORT    Narrative  EXAM:  XR CHEST PORT    INDICATION:  Evaluate infiltrates    COMPARISON: 12/30/2021    TECHNIQUE: AP portable upright chest view    FINDINGS: Improved bilateral airspace opacities compared to the prior  radiograph. Coarse interstitial markings are still present in the lower lobes  bilaterally. No pleural effusion or pneumothorax. Heart size is normal. Osseous  structures are unchanged. Impression  Improved coarse interstitial opacities bilaterally.        MRI Results (most recent):          Past Medical History:   Diagnosis Date    Anxiety disorder     Atrial fibrillation ([address]     CAD (coronary artery disease) 2007    stents, CABG x 3v    Carotid stenosis     Cervical stenosis of spinal canal 07/2019    Chronic kidney disease     Cough     CVA (cerebral vascular accident) ([address] 07/2019    left lacunar infarct at head of caudate    Depression     AND CHRONIC ANXIETY    Diabetes (HCC)     GERD (gastroesophageal reflux disease)     High cholesterol     History of peptic ulcer     Bleeding ulcer with increased NSAID use    Hypertension     Left carotid stenosis 07/2019    s/p left CEA with Dr. Toan Caceres, old 2007    PUD (peptic ulcer disease)     Stroke [facility]     Tremor     Valvular heart disease            Past Surgical History:   Procedure Laterality Date    COLONOSCOPY N/A 12/17/2021    COLONOSCOPY   :- performed by Sola Posadas MD at [address] HX CAROTID ENDARTERECTOMY  07/2019    HX CORONARY ARTERY BYPASS GRAFT      HX TONSILLECTOMY  1963    NM CARDIAC SURG PROCEDURE UNLIST      CABG X3 VESSEL    NM TOTAL KNEE ARTHROPLASTY Right 2015               Visit Vitals  /72 (BP 1 Location: Right lower arm, BP Patient Position: At rest)   Pulse (!) 108   Temp 97.4 °F (36.3 °C)   Resp 16   Ht 5' 5\" (1.651 m)   Wt 163 lb 12.8 oz (74.3 kg)   SpO2 97%   BMI 27.26 kg/m² Wt Readings from Last 3 Encounters:   01/04/22 163 lb 12.8 oz (74.3 kg)   12/18/21 173 lb 8 oz (78.7 kg)   11/03/21 182 lb 8.7 oz (82.8 kg)         Review of Systems:   Pertinent items are noted in the History of Present Illness. No intake/output data recorded. 01/03 1901 - 01/05 0700  In: 360 [P.O.:360]  Out: 3075 [Urine:3075]      Telemetry: AFIB    Physical Exam:    Neck: no JVD  Heart: irregularly irregular rhythm  Lungs: clear to auscultation bilaterally  Abdomen: soft, non-tender.  Bowel sounds normal. No masses,  no organomegaly  Extremities: no edema    Current Facility-Administered Medications   Medication Dose Route Frequency    [START ON 1/6/2022] bumetanide (BUMEX) tablet 1 mg  1 mg Oral DAILY    metoprolol tartrate (LOPRESSOR) tablet 25 mg  25 mg Oral Q6H    apixaban (ELIQUIS) tablet 2.5 mg  2.5 mg Oral BID    ARIPiprazole (ABILIFY) tablet 20 mg  20 mg Oral DAILY    atorvastatin (LIPITOR) tablet 40 mg  40 mg Oral QHS    busPIRone (BUSPAR) tablet 5 mg  5 mg Oral TID    carbidopa-levodopa (SINEMET)  mg per tablet 2 Tablet  2 Tablet Oral QID    cholecalciferol (VITAMIN D3) (1000 Units /25 mcg) tablet 1,000 Units  1,000 Units Oral DAILY    cilostazoL (PLETAL) tablet 100 mg  100 mg Oral ACB&D    clopidogreL (PLAVIX) tablet 75 mg  75 mg Oral DAILY AFTER BREAKFAST    DULoxetine (CYMBALTA) capsule 120 mg  120 mg Oral DAILY    cyanocobalamin (VITAMIN B12) tablet 100 mcg  100 mcg Oral DAILY    gabapentin (NEURONTIN) capsule 100 mg  100 mg Oral QHS    melatonin tablet 6 mg  6 mg Oral QHS    pantoprazole (PROTONIX) tablet 40 mg  40 mg Oral ACB    nitroglycerin (NITROSTAT) tablet 0.4 mg  0.4 mg SubLINGual Q5MIN PRN    polyethylene glycol (MIRALAX) packet 17 g  17 g Oral DAILY PRN    senna-docusate (PERICOLACE) 8.6-50 mg per tablet 1 Tablet  1 Tablet Oral QHS    dilTIAZem ER (CARDIZEM CD) capsule 120 mg  120 mg Oral DAILY    hydrALAZINE (APRESOLINE) tablet 50 mg  50 mg Oral TID         All Cardiac Markers in the last 24 hours:  No results found for: CPK, CK, CKMMB, CKMB, RCK3, CKMBT, CKMBPOC, CKNDX, CKND1, MONSTER, TROPT, TROIQ, ANABELLE, TROPT, TNIPOC, BNP, BNPP, BNPNT     Lab Results   Component Value Date/Time    Creatinine (POC) 1.6 (H) 02/24/2020 10:32 AM    Creatinine 2.12 (H) 01/05/2022 01:13 AM          Lab Results   Component Value Date/Time    CK 75 07/11/2019 09:35 AM    CK - MB 2.7 07/11/2019 09:35 AM    CK-MB Index 3.6 (H) 07/11/2019 09:35 AM    Troponin-I, Qt. 0.05 (H) 04/24/2021 04:54 AM     (H) 06/08/2014 04:12 AM         Lab Results   Component Value Date/Time    WBC 4.2 01/05/2022 01:13 AM    HGB 7.3 (L) 01/05/2022 01:13 AM    HCT 22.9 (L) 01/05/2022 01:13 AM    PLATELET 270 38/24/5538 01:13 AM    .3 (H) 01/05/2022 01:13 AM         Lab Results   Component Value Date/Time    Sodium 136 01/05/2022 01:13 AM    Potassium 4.0 01/05/2022 01:13 AM    Chloride 104 01/05/2022 01:13 AM    CO2 23 01/05/2022 01:13 AM    Anion gap 9 01/05/2022 01:13 AM    Glucose 133 (H) 01/05/2022 01:13 AM    BUN 47 (H) 01/05/2022 01:13 AM    Creatinine 2.12 (H) 01/05/2022 01:13 AM    BUN/Creatinine ratio 22 (H) 01/05/2022 01:13 AM    GFR est AA 27 (L) 01/05/2022 01:13 AM    GFR est non-AA 22 (L) 01/05/2022 01:13 AM    Calcium 9.2 01/05/2022 01:13 AM         Lab Results   Component Value Date/Time     (H) 06/08/2014 04:12 AM     (H) 06/04/2014 04:44 AM    NT pro-BNP 13,944 (H) 01/03/2022 03:44 AM    NT pro-BNP 13,027 (H) 01/01/2022 05:33 AM    NT pro-BNP 17,648 (H) 12/30/2021 08:45 AM    NT pro-BNP 8,184 (H) 12/19/2021 03:17 AM    NT pro-BNP 19,048 (H) 12/12/2021 04:43 AM         Lab Results   Component Value Date/Time    Cholesterol, total 148 09/23/2020 04:27 AM    HDL Cholesterol 52 09/23/2020 04:27 AM    LDL, calculated 83.8 09/23/2020 04:27 AM    VLDL, calculated 12.2 09/23/2020 04:27 AM    Triglyceride 61 09/23/2020 04:27 AM    CHOL/HDL Ratio 2.8 09/23/2020 04:27 AM Lab Results   Component Value Date/Time    ALT (SGPT) 9 (L) 12/31/2021 01:13 AM    Alk.  phosphatase 87 12/31/2021 01:13 AM    Bilirubin, total 0.8 12/31/2021 01:13 AM

## 2022-01-05 NOTE — PROGRESS NOTES
[facility]  Hospitalist Group                                                                                          Hospitalist Progress Note  [facility],   Answering service: [phone] from in house phone        Date of Service:  2022  NAME:  Verena Hoffman  :  1940  MRN:  [id number]      Admission Summary:   Verena Hoffman is a [de-identified] y.o. female who presents with sob   MsRenny Rodriguez is an 56-year-old female with H/o afib, CAD, CKD 3, and CVA who presents to the ER with complaints of shortness of breath. Per EMS report, her symptoms started several hours prior to arrival. Per their report, she is on 2 L of home oxygen. Her oxygen saturations on her home 2 L were 81%. . She was placed on a nonrebreather with oxygen saturations in the mid 90s. She had reported some chest pain earlier. . She denies cough, however, she is coughing during the exam. She is not sure if she has been vaccinated for COVID-19,  She denies any leg swelling. She denies any other complaints. She was given bumex 1mg iv one dose in ER. Also gave one dose of IV levaquin and zosyn in ER  Currently pt Feels improved.        Interval history / Subjective: Follow-up heart failure exacerbation. Patient seen and examined. Sitting in chair. No distress. On home oxygen. HR more controlled <100. Assessment & Plan:     Acute on chronic respiratory failure with hypoxia: improving - on 2 liters   Acute on chronic diastolic congestive heart failure:  -Suspected heart failure exacerbation  -Continue diuresis, strict I's and O's, daily weights  -Appreciate cardiology and nephrology assistance  -transition to bumex 1 mg daily   -CXR 1/2 with improved bilateral infiltrates   -Echo with normal EF    Atrial fibrillation with RVR: persistent   -Continue Eliquis, diltiazem. Carvedilol changed to metoprolol.  Plans for metoprolol 50 mg BID on discharge  -dose adjustments per cardiology    MORENO on CKD stage III:  -Nephrology following, appreciate recommendations  -Renal ultrasound consistent with renal medical parenchymal disease   -unable to place pittman catheter     CAD s/p CABG:  -Continue home statin, Plavix    Hypertension:  -Continue carvedilol, diltiazem, hydralazine    Hypokalemia: Repleted    Anemia, microcytic: monitor closely     PT/OT    Code status: DNR  DVT prophylaxis: Ravin Lewis 6830 discussed with: Patient/Family  Anticipated Disposition: SNF  Anticipated Discharge: pending placement      Hospital Problems  Date Reviewed: 1/1/2022          Codes Class Noted POA    CHF exacerbation ([facility] ICD-10-CM: I50.9  ICD-9-CM: 428.0  9/25/2020 Unknown                Review of Systems:   Negative unless stated above      Vital Signs:    Last 24hrs VS reviewed since prior progress note. Most recent are:  Visit Vitals  /84 (BP 1 Location: Right upper arm, BP Patient Position: At rest)   Pulse 91   Temp 98.1 °F (36.7 °C)   Resp 14   Ht 5' 5\" (1.651 m)   Wt 74.3 kg (163 lb 12.8 oz)   SpO2 100%   BMI 27.26 kg/m²         Intake/Output Summary (Last 24 hours) at 1/5/2022 1609  Last data filed at 1/5/2022 1604  Gross per 24 hour   Intake 600 ml   Output 850 ml   Net -250 ml        Physical Examination:     I had a face to face encounter with this patient and independently examined them on 1/5/2022 as outlined below:          Constitutional:  No acute distress, cooperative, pleasant    ENT:  Oral mucosa moist, oropharynx benign. Resp:  diminished bilaterally. No accessory muscle use   CV:  normal rate, irregularly irregular, no murmurs, gallops, rubs    GI:  Soft, non distended, non tender.  normoactive bowel sounds, no hepatosplenomegaly     Musculoskeletal:  No edema, blisters bilaterally, warm, 2+ pulses throughout    Neurologic:  Moves all extremities            Data Review:    Review and/or order of clinical lab test  Review and/or order of tests in the radiology section of CPT  Review and/or order of tests in the medicine section of [facility]      Labs:     Recent Labs     01/05/22  0113 01/03/22  0344   WBC 4.2 5.7   HGB 7.3* 7.5*   HCT 22.9* 22.7*    281     Recent Labs     01/05/22  0113 01/04/22  0034 01/03/22  0344    132* 133*   K 4.0 4.0 3.7    103 102   CO2 23 21 23   BUN 47* 48* 49*   CREA 2.12* 2.11* 2.13*   * 143* 124*   CA 9.2 9.3 8.9   MG 2.3 2.3 2.2   PHOS 4.8* 4.2 4.4     Recent Labs     01/05/22  0113   ALB 3.2*     No results for input(s): INR, PTP, APTT, INREXT, INREXT in the last 72 hours. No results for input(s): FE, TIBC, PSAT, FERR in the last 72 hours. Lab Results   Component Value Date/Time    Folate 7.8 12/14/2021 04:40 AM      No results for input(s): PH, PCO2, PO2 in the last 72 hours. No results for input(s): CPK, CKNDX, TROIQ in the last 72 hours.     No lab exists for component: CPKMB  Lab Results   Component Value Date/Time    Cholesterol, total 148 09/23/2020 04:27 AM    HDL Cholesterol 52 09/23/2020 04:27 AM    LDL, calculated 83.8 09/23/2020 04:27 AM    Triglyceride 61 09/23/2020 04:27 AM    CHOL/HDL Ratio 2.8 09/23/2020 04:27 AM     Lab Results   Component Value Date/Time    Glucose (POC) 108 12/21/2021 06:15 AM    Glucose (POC) 148 (H) 12/20/2021 09:54 PM    Glucose (POC) 156 (H) 12/18/2021 11:53 AM    Glucose (POC) 96 12/18/2021 10:18 AM    Glucose (POC) 120 (H) 04/24/2021 04:54 PM     Lab Results   Component Value Date/Time    Color YELLOW/STRAW 12/30/2021 08:23 PM    Appearance CLOUDY (A) 12/30/2021 08:23 PM    Specific gravity 1.017 12/30/2021 08:23 PM    pH (UA) 6.0 12/30/2021 08:23 PM    Protein 100 (A) 12/30/2021 08:23 PM    Glucose Negative 12/30/2021 08:23 PM    Ketone Negative 12/30/2021 08:23 PM    Bilirubin Negative 12/30/2021 08:23 PM    Urobilinogen 1.0 12/30/2021 08:23 PM    Nitrites Negative 12/30/2021 08:23 PM    Leukocyte Esterase MODERATE (A) 12/30/2021 08:23 PM    Epithelial cells FEW 12/30/2021 08:23 PM    Bacteria 1+ (A) 12/30/2021 08:23 PM    WBC 0-4 12/30/2021 08:23 PM    RBC 0-5 12/30/2021 08:23 PM         Medications Reviewed:     Current Facility-Administered Medications   Medication Dose Route Frequency    [START ON 1/6/2022] bumetanide (BUMEX) tablet 1 mg  1 mg Oral DAILY    metoprolol tartrate (LOPRESSOR) tablet 25 mg  25 mg Oral Q6H    apixaban (ELIQUIS) tablet 2.5 mg  2.5 mg Oral BID    ARIPiprazole (ABILIFY) tablet 20 mg  20 mg Oral DAILY    atorvastatin (LIPITOR) tablet 40 mg  40 mg Oral QHS    busPIRone (BUSPAR) tablet 5 mg  5 mg Oral TID    carbidopa-levodopa (SINEMET)  mg per tablet 2 Tablet  2 Tablet Oral QID    cholecalciferol (VITAMIN D3) (1000 Units /25 mcg) tablet 1,000 Units  1,000 Units Oral DAILY    cilostazoL (PLETAL) tablet 100 mg  100 mg Oral ACB&D    clopidogreL (PLAVIX) tablet 75 mg  75 mg Oral DAILY AFTER BREAKFAST    DULoxetine (CYMBALTA) capsule 120 mg  120 mg Oral DAILY    cyanocobalamin (VITAMIN B12) tablet 100 mcg  100 mcg Oral DAILY    gabapentin (NEURONTIN) capsule 100 mg  100 mg Oral QHS    melatonin tablet 6 mg  6 mg Oral QHS    pantoprazole (PROTONIX) tablet 40 mg  40 mg Oral ACB    nitroglycerin (NITROSTAT) tablet 0.4 mg  0.4 mg SubLINGual Q5MIN PRN    polyethylene glycol (MIRALAX) packet 17 g  17 g Oral DAILY PRN    senna-docusate (PERICOLACE) 8.6-50 mg per tablet 1 Tablet  1 Tablet Oral QHS    dilTIAZem ER (CARDIZEM CD) capsule 120 mg  120 mg Oral DAILY    hydrALAZINE (APRESOLINE) tablet 50 mg  50 mg Oral TID     ______________________________________________________________________  EXPECTED LENGTH OF STAY: 3d 19h  ACTUAL LENGTH OF STAY:          [address],

## 2022-01-05 NOTE — PROGRESS NOTES
Problem: Mobility Impaired (Adult and Pediatric)  Goal: *Acute Goals and Plan of Care (Insert Text)  Description: FUNCTIONAL STATUS PRIOR TO ADMISSION: The patient was functional at the wheelchair level and was modified independent for transfers to the wheelchair per pt report. Pt denies any falls in the last year. HOME SUPPORT PRIOR TO ADMISSION: The patient lived in [facility] due to her apartment lease ending in November 2021 and is looking for new living arrangements. Pt reports she has a niece in the area that assist her. Physical Therapy Goals  Initiated 1/2/2022  1. Patient will move from supine to sit and sit to supine  in bed with modified independence within 7 day(s). 2.  Patient will transfer from bed to chair and chair to bed with minimal assistance/contact guard assist using the least restrictive device within 7 day(s). 3.  Patient will perform sit to stand with minimal assistance/contact guard assist within 7 day(s). Outcome: Progressing Towards Goal   PHYSICAL THERAPY TREATMENT  Patient: Jono Joseph (38 y.o. female)  Date: 1/5/2022  Diagnosis: CHF exacerbation ([facility] [address] [I50.9] <principal problem not specified>       Precautions: Fall  Chart, physical therapy assessment, plan of care and goals were reviewed. ASSESSMENT  Patient continues with skilled PT services and is progressing towards goals. Patient currently limited by pain, gait instability, impaired balance and decreased activity tolerance. Patient currently needing modA to come to EOB with fair sitting balance. Sit to stand with modA x 2 with fair balance and has posterior lean initially. Improves with time and cues for upright posture. Able to take a few steps to the chair with modA x 1 (Thien of another and RW). Patient continues to be below baseline and recommend return to SNF rehab setting to progress to more independent level of function.        Other factors to consider for discharge: at risk for falls, below baseline         PLAN :  Patient continues to benefit from skilled intervention to address the above impairments. Continue treatment per established plan of care. to address goals. Recommendation for discharge: (in order for the patient to meet his/her long term goals)  Therapy up to 5 days/week in SNF setting      IF patient discharges home will need the following DME: to be determined (TBD)       SUBJECTIVE:   Patient stated I don't remember what I did over there.     OBJECTIVE DATA SUMMARY:   Critical Behavior:  Neurologic State: Alert  Orientation Level: Oriented X4  Cognition: Appropriate for age attention/concentration,Follows commands  Safety/Judgement: Awareness of environment  Functional Mobility Training:  Bed Mobility:     Supine to Sit: Moderate assistance     Scooting: Moderate assistance        Transfers:  Sit to Stand: Moderate assistance;Assist x2 (initially with posterior lean)  Stand to Sit: Moderate assistance;Assist x2                             Balance:  Sitting: Impaired  Sitting - Static: Fair (occasional)  Sitting - Dynamic: Fair (occasional)  Standing: Impaired  Standing - Static: Constant support; Fair  Standing - Dynamic : Constant support; Fair  Ambulation/Gait Training:  Distance (ft): 4 Feet (ft)  Assistive Device: Gait belt;Walker, rolling  Ambulation - Level of Assistance: Minimal assistance;Assist x2        Gait Abnormalities: Decreased step clearance;Shuffling gait        Base of Support: Center of gravity altered; Widened     Speed/Samira: Pace decreased (<100 feet/min); Shuffled; Slow  Step Length: Left shortened;Right shortened          Pain Rating:  No c/o pain during session    Activity Tolerance:   Fair and requires rest breaks    After treatment patient left in no apparent distress:   Sitting in chair, Call bell within reach, and Bed / chair alarm activated    COMMUNICATION/COLLABORATION:   The patients plan of care was discussed with: Physical therapist, Occupational therapist, and Registered nurse.      Elvin Person PT, DPT   Time Calculation: 23 mins

## 2022-01-05 NOTE — PROGRESS NOTES
Nephrology Progress Note  Barbie Moseley  Date of Admission : 12/30/2021    CC: Follow up for MORENO on CKD       Assessment and Plan     MORENO on CKD:  - diff: progressive disease vs CRS vs obstruction  - renal U/S neg for hydro, + for bladder distention  -Creatinine stable. - Change Bumex to p.o. 1 mg daily  - daily labs and I/Os    Afib with RVR  - per cards  -Patient switched to metoprolol     CKD IIIa:  - baseline Cr around 1.4  - presumed 2/2 chronic HTN     HTN:  - BP stable     Acute on Chronic HFpEF  CAD, hx of CABG  - ECHO w/ EF 55-60%  - rapid COVID neg  - diuretics as above      Parkinsons  Hx of CVA  PAD w/ L CEA  AAA         Interval History:  Seen and examined. Robe in A. fib. Heart rate appears to be better controlled 80 to 90/min. Cardizem was switched to metoprolol yesterday. . Denies any cough/SOB. Denies cp, n/v/d    Current Medications: all current  Medications have been eviewed in EPIC  Review of Systems: Pertinent items are noted in HPI. Objective:  Vitals:    Vitals:    01/05/22 0537 01/05/22 0600 01/05/22 0817 01/05/22 1000   BP: (!) 114/52  135/72    Pulse: 88 99 95 (!) 108   Resp: 16  16    Temp: 98.4 °F (36.9 °C)  97.4 °F (36.3 °C)    SpO2: 98%  97%    Weight:       Height:         Intake and Output:  No intake/output data recorded. 01/03 1901 - 01/05 0700  In: 360 [P.O.:360]  Out: 3075 [Urine:3075]    Physical Examination:    General: NAD,Conversant   Neck:  Supple, no mass  Resp:  CTA  CV:  Irregular, tachy  GI:  Soft, NT, + Bowel sounds, no hepatosplenomegaly  Neurologic:  Non focal  Psych:             AAO x 3 appropriate affect     []    High complexity decision making was performed  []    Patient is at high-risk of decompensation with multiple organ involvement    Lab Data Personally Reviewed: I have reviewed all the pertinent labs, microbiology data and radiology studies during assessment.     Recent Labs     01/05/22  0113 01/04/22  0034 01/03/22  0344 01/02/22  1200 01/02/22  1200    132* 133*   < > 133*   K 4.0 4.0 3.7   < > 4.0    103 102   < > 104   CO2 23 21 23   < > 24   * 143* 124*   < > 182*   BUN 47* 48* 49*   < > 49*   CREA 2.12* 2.11* 2.13*   < > 2.35*   CA 9.2 9.3 8.9   < > 9.0   MG 2.3 2.3 2.2  --   --    PHOS 4.8* 4.2 4.4   < > 4.0   ALB 3.2*  --   --   --  2.9*    < > = values in this interval not displayed. Recent Labs     01/05/22  0113 01/03/22  0344   WBC 4.2 5.7   HGB 7.3* 7.5*   HCT 22.9* 22.7*    281     No results found for: SDES  Lab Results   Component Value Date/Time    Culture result: MRSA NOT PRESENT 12/10/2021 06:53 AM    Culture result:  12/10/2021 06:53 AM     Screening of patient nares for MRSA is for surveillance purposes and, if positive, to facilitate isolation considerations in high risk settings. It is not intended for automatic decolonization interventions per se as regimens are not sufficiently effective to warrant routine use.     Culture result: NO GROWTH 5 DAYS 12/10/2021 04:50 AM     Recent Results (from the past 24 hour(s))   RENAL FUNCTION PANEL    Collection Time: 01/05/22  1:13 AM   Result Value Ref Range    Sodium 136 136 - 145 mmol/L    Potassium 4.0 3.5 - 5.1 mmol/L    Chloride 104 97 - 108 mmol/L    CO2 23 21 - 32 mmol/L    Anion gap 9 5 - 15 mmol/L    Glucose 133 (H) 65 - 100 mg/dL    BUN 47 (H) 6 - 20 MG/DL    Creatinine 2.12 (H) 0.55 - 1.02 MG/DL    BUN/Creatinine ratio 22 (H) 12 - 20      GFR est AA 27 (L) >60 ml/min/1.73m2    GFR est non-AA 22 (L) >60 ml/min/1.73m2    Calcium 9.2 8.5 - 10.1 MG/DL    Phosphorus 4.8 (H) 2.6 - 4.7 MG/DL    Albumin 3.2 (L) 3.5 - 5.0 g/dL   CBC W/O DIFF    Collection Time: 01/05/22  1:13 AM   Result Value Ref Range    WBC 4.2 3.6 - 11.0 K/uL    RBC 2.26 (L) 3.80 - 5.20 M/uL    HGB 7.3 (L) 11.5 - 16.0 g/dL    HCT 22.9 (L) 35.0 - 47.0 %    .3 (H) 80.0 - 99.0 FL    MCH 32.3 26.0 - 34.0 PG    MCHC 31.9 30.0 - 36.5 g/dL    RDW 13.8 11.5 - 14.5 %    PLATELET 104 471 - 400 K/uL    MPV 8.8 (L) 8.9 - 12.9 FL    NRBC 0.0 0  WBC    ABSOLUTE NRBC 0.00 0.00 - 0.01 K/uL   MAGNESIUM    Collection Time: 01/05/22  1:13 AM   Result Value Ref Range    Magnesium 2.3 1.6 - 2.4 mg/dL                   Dottie Bence, MD  [address]  Phone - [phone]   Fax - [phone]  [email or web address]

## 2022-01-05 NOTE — PROGRESS NOTES
Problem: Self Care Deficits Care Plan (Adult)  Goal: *Acute Goals and Plan of Care (Insert Text)  Description:   FUNCTIONAL STATUS PRIOR TO ADMISSION: Patient admitted from [facility] (reports not receiving therapy). Used wheelchair to get around, usually able to transfer without assistance. Set up for ADL including sponge baths    HOME SUPPORT: Lived in SNF    Occupational Therapy Goals  Initiated 1/3/2022  1. Patient will perform grooming with supervision/set-up within 7 day(s). 2.  Patient will perform upper body dressing with supervision/set-up within 7 day(s). 3.  Patient will perform lower body dressing with minimal assistance/contact guard assist within 7 day(s). 4.  Patient will perform all aspects of toileting with supervision/set-up within 7 day(s). 5.  Patient will utilize energy conservation techniques during functional activities with verbal cues within 7 day(s). Outcome: Progressing Towards Goal   OCCUPATIONAL THERAPY TREATMENT  Patient: Tran Cheatham (89 y.o. female)  Date: 1/5/2022  Diagnosis: CHF exacerbation ([facility] [address] [I50.9] <principal problem not specified>       Precautions: Fall  Chart, occupational therapy assessment, plan of care, and goals were reviewed. ASSESSMENT  Patient continues with skilled OT services and is progressing towards goals. Nursing cleared for therapy. Patient agreeable with education on importance of therapy and encouragement. Supine to sit with mod A with initial fair static sitting balance that increased to good with additional time up. Participated with grooming tasks at EOB. She is limited secondary to B shoulder limited forward flexion limiting her reach to back of her head. Sit <> stand with mod Ax2 to RW level with posterior lean with vc and tactile cues to correct. Transfer at [address] [place] [address] with sidestepping and turning with min-mod AX2. HR fluctuating through out session. 80s-120s seen on monitor, did not sustain in elevated HR.  Spo2 stable on 2L        Current Level of Function Impacting Discharge (ADLs): hair mgmt mod A, self care transfer min-mod Ax2 at RW level    Other factors to consider for discharge: Patient with recent admission, prior to this she lives at home alone. She was admitted from SNF. Recommend to return to SNF as she is unsafe for dc home alone. PLAN :  Patient continues to benefit from skilled intervention to address the above impairments. Continue treatment per established plan of care to address goals. Recommend with staff: OOB to chair for meals and BSC for toileting as appropriate x2 assist at RW level    Recommend next OT session: per POC    Recommendation for discharge: (in order for the patient to meet his/her long term goals)  Therapy up to 5 days/week in SNF setting    This discharge recommendation:  Has been made in collaboration with the attending provider and/or case management    IF patient discharges home will need the following DME: hospital bed and wheelchair       SUBJECTIVE:   Patient stated I didn't know my shoulders were bad until you said something.     OBJECTIVE DATA SUMMARY:   Cognitive/Behavioral Status:  Neurologic State: Alert  Orientation Level: Oriented X4  Cognition: Appropriate for age attention/concentration; Follows commands             Functional Mobility and Transfers for ADLs:  Bed Mobility:  Supine to Sit: Moderate assistance  Scooting: Moderate assistance    Transfers:  Sit to Stand: Moderate assistance;Assist x2 (initially with posterior lean)          Balance:  Sitting: Impaired  Sitting - Static: Fair (occasional)  Sitting - Dynamic: Fair (occasional)  Standing: Impaired  Standing - Static: Constant support; Fair  Standing - Dynamic : Constant support; Fair    ADL Intervention:    Patient instructed and indicated understanding the benefits of maintaining activity tolerance, functional mobility, and independence with self care tasks during acute stay  to ensure safe return home and to baseline. Encouraged patient to increase frequency and duration OOB, be out of bed for all meals, perform daily ADLs (as approved by RN/MD regarding bathing etc), and performing functional mobility to/from [facility]. Grooming  Brushing/Combing Hair: Moderate assistance (for posterior aspect secondary to limited B shoulder ROM)    Lower Body Dressing Assistance  Socks: Total assistance (dependent)         Pain:  No c/o pain    Activity Tolerance:   Fair    After treatment patient left in no apparent distress:   Sitting in chair, Call bell within reach and Bed / chair alarm activated    COMMUNICATION/COLLABORATION:   The patients plan of care was discussed with: Physical therapist and Registered nurse and case mgmt  .      Faustina Stauffer OT  Time Calculation: 23 mins

## 2022-01-05 NOTE — PROGRESS NOTES
0200: Lab called with a critical Lactic Acid level of 2.3. Telephone read back and appropriate documentation done. 0202: Perfect served Doris Levine MD. No new orders given at this time.

## 2022-01-05 NOTE — PROGRESS NOTES
Transition of Care  1. RUR 29% High  2. Disposition Return to [facility] and bed availability  3. DME NA  4. Transportation BLS  5. Follow Up PCP, Specialist      CM contacted [employer] at  [phone] to initiate insurance auth. CM to fax supporting medicals to [phone]. ELIZABETH placed call to Elise at [address] to advise that patient will be ready for DC when auth has been received. Do Hirsch will follow up with ELIZABETH tomorrow regarding bed availability. ELIZABETH updated attending.      Stephon Rodriguez MS

## 2022-01-05 NOTE — ADT AUTH CERT NOTES
Heart Failure - Care Day 3 (1/1/2022) by Akil Campoverde RN       Review Status Review Entered   Completed 1/4/2022 17:08      Criteria Review      Care Day: 3 Care Date: 1/1/2022 Level of Care: Telemetry    Guideline Day 2    Level Of Care    (X) Floor    Clinical Status    (X) * Hemodynamic stability    (X) * Mental status at baseline    (X) * No evidence of myocardial ischemia    (X) * Cardiac rate and rhythm acceptable    ( ) * Oxygenation at baseline or improved    1/4/2022 17:08:10 EST by Yoseph Mcbride      98.8, 104/55, HR 70, RR 18, O2 sat 92, NC 4L    ( ) * Pulmonary edema absent or improved    Activity    (X) Advance activity as tolerated    Routes    (X) Taper parenteral medications    1/4/2022 17:08:10 EST by Yoseph Mcbride      Meds: Eliquis po 2.5mg bid, abilify po 20mg, Lipitor po 40mg, Sinemet po qid, pletal po 100mg bid, Plavix po 75mg, Cymbalta po 120mg, Neurontin po 100mg, melatonin po 6mg, protonix po 40mg, morphine iv 1mg,    (X) Oral diet    (X) Adjust fluid restriction    Interventions    (X) * Pulmonary catheter absent    (X) Weigh    (X) Possible electrolytes    1/4/2022 17:08:10 EST by Hameed Ding      Labs: sodium 135, glucose 105, BUN 49, Creat 2.25, NT pro BNP 55210    (X) Oxygen    Medications    (X) Diuretics    1/4/2022 17:08:10 EST by Yoseph Mcbride      bumex iv 1mg bid,    (X) Neprilysin inhibitor with ARB, or ACE inhibitor or ARB    1/4/2022 17:08:10 EST by Yoseph Mcbride      coreg po 3.125mg bid,    (X) Beta-blocker    1/4/2022 17:08:10 EST by Yoseph Mcbride      buspar po 5mg tid,Cardizem po 120mg,    (X) Possible nitrates, hydralazine    1/4/2022 17:08:10 EST by Yoseph Mcbride      hydralzine po 50mg tid,    * Milestone   Additional Notes   Neprho:   MORENO on CKD:   - diff: progressive disease vs CRS vs obstruction   - renal U/S neg for hydro, + for bladder distention   - cont IV diuretics for now   - bladder scan and place pittman if residual > 250cc   - daily labs and I/Os Cardio:   Comfortable at rest and weight is down from admission. Urine output is uncertain because of bladder retention issues which are being addressed and her creatinine is relatively stable though higher than it was in the recent past. Blood pressure and heart rate are well controlled on current meds   Things are clearly moving in the right direction with her but we may be limited by her renal insufficiency in terms of ability to completely diurese her. Would continue current treatment for now.          Hospitalist;   Acute on chronic respiratory failure with hypoxia:    Acute on chronic diastolic congestive heart failure:   -Suspected heart failure exacerbation   -Continue diuresis, strict I's and O's, daily weights   -Appreciate cardiology assistance   -continue bumex BID   -Echo ordered and pending       MORENO on CKD stage III:   -Nephrology following, appreciate recommendations   -Renal ultrasound   -unable to place pittman catheter         Heart Failure - Care Day 2 (12/31/2021) by Carlen Kayser, RN       Review Status Review Entered   Completed 1/4/2022 17:02      Criteria Review      Care Day: 2 Care Date: 12/31/2021 Level of Care: Telemetry    Guideline Day 2    Level Of Care    (X) Floor    Clinical Status    (X) * Hemodynamic stability    (X) * Mental status at baseline    (X) * No evidence of myocardial ischemia    (X) * Cardiac rate and rhythm acceptable    ( ) * Oxygenation at baseline or improved    1/4/2022 17:02:32 EST by Mor Menjivar      98.0, 147/62, HR 64, RR 19, RR 95, NC 4L    ( ) * Pulmonary edema absent or improved    Activity    (X) Advance activity as tolerated    Routes    (X) Taper parenteral medications    1/4/2022 17:02:32 EST by Mor Menjivar      Meds: Eliquis po 2.5mg bid, abilify po 20mg, Lipitor po 40mg, Sinemet po qid, pletal po 100mg bid, Plavix po 75mg, Cymbalta po 120mg, Neurontin po 100mg, melatonin po 6mg, protonix po 40mg, morphine iv 1mg,    (X) Oral diet    (X) Adjust fluid restriction    Interventions    (X) * Pulmonary catheter absent    (X) Weigh    (X) Possible electrolytes    1/4/2022 17:02:32 EST by Mor Menjivar      Labs: RBC 2.35, HGb 7.7, HCT 23.8, Neutro 88, sodium 133, potassium 3.4, glucose 187, BUN 44, Creat 2.27, Albumin 3.4, AST 14, troponin 412,    (X) Oxygen    Medications    (X) Diuretics    1/4/2022 17:02:32 EST by Mor Menjivar      bumex iv 1mg bid,    (X) Neprilysin inhibitor with ARB, or ACE inhibitor or ARB    1/4/2022 17:02:32 EST by Mor Menjivar      coreg po 3.125mg bid    (X) Beta-blocker    1/4/2022 17:02:32 EST by Mor Menjivar      buspar po 5mg tid,Cardizem po 120mg,    (X) Possible nitrates, hydralazine    1/4/2022 17:02:32 EST by Mor Menjivar      hydralzine po 50mg tid,    * Milestone   Additional Notes         ECHO:   Left Ventricle: Left ventricle size is normal. Normal wall thickness. Normal wall motion. Normal left ventricular systolic function with a visually estimated EF of 55 - 60%. o  Aortic Valve: Mild stenosis. Mild transvalvular regurgitation. Mean gradient of 14 mmhg. o  Mitral Valve: Moderate mitral annular calcification. Mild to moderate transvalvular regurgitation. o  Pulmonary Arteries: Mild pulmonary hypertension present. Estimated PA pressure of 45 mmHg. o  Pericardium:  No pericardial effusion. Left pleural effusion.          Hospitalist:   Chief Complaint:   2300p: Rapid response called by nursing staff on 3N for worsening shortness of breath.  Patient endorses shortness of breath, denies chest pain.       Clinical Presentation:   Patient is an 50-year-old female admitted today for shortness of breath, hypoxia and increasing oxygen requirement.  She normally uses 2 L at home, was only saturating 81%.  PMH significant for acute on chronic diastolic congestive heart failure, fluid overload thought to be contributing.  Has received Bumex since arriving in the ED, has had only moderate urinary output       Physical Exam: Alert, oriented, appears mildly distressed   Sitting upright in bed, arms rated at sides.  Respirations 30 breaths/min with an expiratory wheezing throughout.  Crackles right mid to base, left side diminished.  Current SPO2 90% on 4 LNC   Heart rate regular, EKG shows sinus rhythm 85 bpm   Abdomen soft, nontender not distended      Acute on chronic hypoxic respiratory failure likely due to CHF/fluid overload   DuoNeb now, will give additional 5 mg albuterol if needed for continued wheezing   Bumex 1 mg now   Low threshold for transfer to [facility] for BiPAP       Discussed with RT, discussed with primary nurse       UPDATE 0230a: Medically, appears more comfortable, states breathing is a bit easier.  Now on 10 LNC, SPO2 99%.  Respiratory rate 24, no wheezing.  Minimal urine output however after Bumex 1 mg and morphine 1 mg. Cardio Consukt:   1. Acute on chronic respiratory failure with hypoxia: possibly due to pulmonary condition versus diastolic dysfunction   -antibiotics per IM   -continue diuresis per nephrology, proBNP 66,791 on admission but weight down 10 lbs today   -CR with improved pulmonary edema, persistant basilar consolidation, small pleural effusions    2. Acute on chronic diastolic congestive heart failure:  EF 50-55% by TTE 8/21   -will repeat TTE to reassess cardiac function   -continue diuresis per nephrology   -no ACEi/ARB due to CKD and EF >40%   -continue to optimize BP regimen   3.  HTN:  Elevated on admission but controlled now    -continue coreg 3.125mg BID (dose limited by bradycardia), continue hydralazine    4. MORENO on CKD 3:  creatinine 2.2 today, management per nephrology   5.  CAD s/p CABG in the past   -on plavix, statin, coreg   -troponin slightly elevated but likely related to CKD    6.  Atrial Fib   -currently in NSR   -continue coreg and diltiazem for rate control   -continue eliquis 2.5mg BID for anticoagulation, continue to monitor severity of anemia   7.  Anemia   -Hgb 7.7 today, management per IM    -denies blood in stool    8.  Hypokalemia   -K 3.4, repletion per nephrology    9.  Dyslipidemia   -on atorvastatin, last LDL 83 in 9/20, will need lipids rechecked as OP1. Acute on chronic respiratory failure with hypoxia: possibly due to pulmonary condition versus diastolic dysfunction   -antibiotics per IM   -continue diuresis per nephrology, proBNP 25,226 on admission but weight down 10 lbs today   -CR with improved pulmonary edema, persistant basilar consolidation, small pleural effusions    2. Acute on chronic diastolic congestive heart failure:  EF 50-55% by TTE 8/21   -will repeat TTE to reassess cardiac function   -continue diuresis per nephrology   -no ACEi/ARB due to CKD and EF >40%   -continue to optimize BP regimen   3.  HTN:  Elevated on admission but controlled now    -continue coreg 3.125mg BID (dose limited by bradycardia), continue hydralazine    4. MORENO on CKD 3:  creatinine 2.2 today, management per nephrology   5. CAD s/p CABG in the past   -on plavix, statin, coreg   -troponin slightly elevated but likely related to CKD    6.  Atrial Fib   -currently in NSR   -continue coreg and diltiazem for rate control   -continue eliquis 2.5mg BID for anticoagulation, continue to monitor severity of anemia   7.  Anemia   -Hgb 7.7 today, management per IM    -denies blood in stool    8.  Hypokalemia   -K 3.4, repletion per nephrology    9.  Dyslipidemia   -on atorvastatin, last LDL 83 in 9/20, will need lipids rechecked as OP1. Acute on chronic respiratory failure with hypoxia: possibly due to pulmonary condition versus diastolic dysfunction   -antibiotics per IM   -continue diuresis per nephrology, proBNP 71,360 on admission but weight down 10 lbs today   -CR with improved pulmonary edema, persistant basilar consolidation, small pleural effusions    2.  Acute on chronic diastolic congestive heart failure:  EF 50-55% by TTE 8/21   -will repeat TTE to reassess cardiac function -continue diuresis per nephrology   -no ACEi/ARB due to CKD and EF >40%   -continue to optimize BP regimen   3.  HTN:  Elevated on admission but controlled now    -continue coreg 3.125mg BID (dose limited by bradycardia), continue hydralazine    4. MORENO on CKD 3:  creatinine 2.2 today, management per nephrology   5. CAD s/p CABG in the past   -on plavix, statin, coreg   -troponin slightly elevated but likely related to CKD    6.  Atrial Fib   -currently in NSR   -continue coreg and diltiazem for rate control   -continue eliquis 2.5mg BID for anticoagulation, continue to monitor severity of anemia   7.  Anemia   -Hgb 7.7 today, management per IM    -denies blood in stool    8.  Hypokalemia   -K 3.4, repletion per nephrology    9.  Dyslipidemia   -on atorvastatin, last LDL 83 in 9/20, will need lipids rechecked as OP1. Acute on chronic respiratory failure with hypoxia: possibly due to pulmonary condition versus diastolic dysfunction   -antibiotics per IM   -continue diuresis per nephrology, proBNP 24,312 on admission but weight down 10 lbs today   -CR with improved pulmonary edema, persistant basilar consolidation, small pleural effusions    2. Acute on chronic diastolic congestive heart failure:  EF 50-55% by TTE 8/21   -will repeat TTE to reassess cardiac function   -continue diuresis per nephrology   -no ACEi/ARB due to CKD and EF >40%   -continue to optimize BP regimen   3.  HTN:  Elevated on admission but controlled now    -continue coreg 3.125mg BID (dose limited by bradycardia), continue hydralazine    4. MORENO on CKD 3:  creatinine 2.2 today, management per nephrology   5.  CAD s/p CABG in the past   -on plavix, statin, coreg   -troponin slightly elevated but likely related to CKD    6.  Atrial Fib   -currently in NSR   -continue coreg and diltiazem for rate control   -continue eliquis 2.5mg BID for anticoagulation, continue to monitor severity of anemia   7.  Anemia   -Hgb 7.7 today, management per IM -denies blood in stool    8.  Hypokalemia   -K 3.4, repletion per nephrology    9.  Dyslipidemia   -on atorvastatin, last LDL 83 in 9/20, will need lipids rechecked as OP      Neprho consult:   MORENO on CKD:   - diff: progressive disease vs CRS vs obstruction   - renal U/S neg for hydro, + for bladder distention   - cont diuretics   - bladder scan and place pittman if residual > 250cc   - daily labs and I/Os

## 2022-01-06 VITALS
WEIGHT: 164.24 LBS | RESPIRATION RATE: 18 BRPM | SYSTOLIC BLOOD PRESSURE: 109 MMHG | OXYGEN SATURATION: 99 % | TEMPERATURE: 98.7 F | BODY MASS INDEX: 27.36 KG/M2 | DIASTOLIC BLOOD PRESSURE: 58 MMHG | HEIGHT: 65 IN | HEART RATE: 88 BPM

## 2022-01-06 PROBLEM — I50.9 CHF EXACERBATION (HCC): Status: RESOLVED | Noted: 2020-09-25 | Resolved: 2022-01-06

## 2022-01-06 LAB
ALBUMIN SERPL-MCNC: 3.2 G/DL (ref 3.5–5)
ANION GAP SERPL CALC-SCNC: 7 MMOL/L (ref 5–15)
BUN SERPL-MCNC: 48 MG/DL (ref 6–20)
BUN/CREAT SERPL: 24 (ref 12–20)
CALCIUM SERPL-MCNC: 9.2 MG/DL (ref 8.5–10.1)
CHLORIDE SERPL-SCNC: 102 MMOL/L (ref 97–108)
CO2 SERPL-SCNC: 26 MMOL/L (ref 21–32)
CREAT SERPL-MCNC: 2.02 MG/DL (ref 0.55–1.02)
GLUCOSE SERPL-MCNC: 104 MG/DL (ref 65–100)
PHOSPHATE SERPL-MCNC: 4.6 MG/DL (ref 2.6–4.7)
POTASSIUM SERPL-SCNC: 4.4 MMOL/L (ref 3.5–5.1)
SODIUM SERPL-SCNC: 135 MMOL/L (ref 136–145)

## 2022-01-06 PROCEDURE — 74011250637 HC RX REV CODE- 250/637: Performed by: HOSPITALIST

## 2022-01-06 PROCEDURE — 74011250637 HC RX REV CODE- 250/637: Performed by: NURSE PRACTITIONER

## 2022-01-06 PROCEDURE — 97530 THERAPEUTIC ACTIVITIES: CPT

## 2022-01-06 PROCEDURE — 80069 RENAL FUNCTION PANEL: CPT

## 2022-01-06 PROCEDURE — 36415 COLL VENOUS BLD VENIPUNCTURE: CPT

## 2022-01-06 PROCEDURE — 74011250637 HC RX REV CODE- 250/637: Performed by: SPECIALIST

## 2022-01-06 PROCEDURE — 74011250637 HC RX REV CODE- 250/637: Performed by: INTERNAL MEDICINE

## 2022-01-06 RX ORDER — AMOXICILLIN 250 MG
1 CAPSULE ORAL
Qty: 30 TABLET | Refills: 0 | Status: SHIPPED
Start: 2022-01-06 | End: 2022-02-18

## 2022-01-06 RX ORDER — METOPROLOL SUCCINATE 100 MG/1
100 TABLET, EXTENDED RELEASE ORAL DAILY
Qty: 30 TABLET | Refills: 0 | Status: SHIPPED
Start: 2022-01-06 | End: 2022-01-14

## 2022-01-06 RX ORDER — BUMETANIDE 1 MG/1
1 TABLET ORAL DAILY
Qty: 30 TABLET | Refills: 0 | Status: SHIPPED
Start: 2022-01-07 | End: 2022-01-14

## 2022-01-06 RX ADMIN — PANTOPRAZOLE SODIUM 40 MG: 40 TABLET, DELAYED RELEASE ORAL at 06:30

## 2022-01-06 RX ADMIN — BUSPIRONE HYDROCHLORIDE 5 MG: 5 TABLET ORAL at 16:28

## 2022-01-06 RX ADMIN — METOPROLOL TARTRATE 25 MG: 25 TABLET, FILM COATED ORAL at 06:30

## 2022-01-06 RX ADMIN — Medication 1000 UNITS: at 09:58

## 2022-01-06 RX ADMIN — CLOPIDOGREL BISULFATE 75 MG: 75 TABLET ORAL at 09:59

## 2022-01-06 RX ADMIN — APIXABAN 2.5 MG: 2.5 TABLET, FILM COATED ORAL at 17:41

## 2022-01-06 RX ADMIN — CARBIDOPA AND LEVODOPA 2 TABLET: 25; 100 TABLET ORAL at 09:59

## 2022-01-06 RX ADMIN — BUSPIRONE HYDROCHLORIDE 5 MG: 5 TABLET ORAL at 09:59

## 2022-01-06 RX ADMIN — DULOXETINE HYDROCHLORIDE 120 MG: 60 CAPSULE, DELAYED RELEASE ORAL at 09:59

## 2022-01-06 RX ADMIN — BUMETANIDE 1 MG: 1 TABLET ORAL at 09:59

## 2022-01-06 RX ADMIN — VITAM B12 100 MCG: 100 TAB at 09:59

## 2022-01-06 RX ADMIN — HYDRALAZINE HYDROCHLORIDE 50 MG: 50 TABLET, FILM COATED ORAL at 16:26

## 2022-01-06 RX ADMIN — CILOSTAZOL 100 MG: 50 TABLET ORAL at 06:30

## 2022-01-06 RX ADMIN — APIXABAN 2.5 MG: 2.5 TABLET, FILM COATED ORAL at 09:58

## 2022-01-06 RX ADMIN — HYDRALAZINE HYDROCHLORIDE 50 MG: 50 TABLET, FILM COATED ORAL at 09:58

## 2022-01-06 RX ADMIN — CARBIDOPA AND LEVODOPA 2 TABLET: 25; 100 TABLET ORAL at 17:42

## 2022-01-06 RX ADMIN — METOPROLOL TARTRATE 25 MG: 25 TABLET, FILM COATED ORAL at 14:10

## 2022-01-06 RX ADMIN — DILTIAZEM HYDROCHLORIDE 120 MG: 120 CAPSULE, COATED, EXTENDED RELEASE ORAL at 09:59

## 2022-01-06 RX ADMIN — CILOSTAZOL 100 MG: 50 TABLET ORAL at 15:45

## 2022-01-06 RX ADMIN — ARIPIPRAZOLE 20 MG: 10 TABLET ORAL at 09:58

## 2022-01-06 NOTE — PROGRESS NOTES
TRANSFER - IN REPORT:    Verbal report received from [address] [facility] [facility] [address] (name) on Ariana Courts  being received from 3N(unit) for routine progression of care      Report consisted of patients Situation, Background, Assessment and   Recommendations(SBAR). Information from the following report(s) SBAR was reviewed with the receiving nurse. Opportunity for questions and clarification was provided. 1616 Dressing to R chest changed. 1 Notified Dr. Alie Spears of small growth of R chest.  Pt to follow up with PCP.     1705 Called report to Olympia Frankel at [facility]. She requests prescriptions for ablify and cymbalta. 46 Dr. Alie Spears to fax prescriptions for ablify and cymbalta to [facility]. [address] discharge instuctions with pt.  Dr. Alie Spears to review med rec with [facility] in AM regarding both coreg and metoprolol ordered on AVS.

## 2022-01-06 NOTE — PROGRESS NOTES
Nephrology Progress Note  Ramona Benavides  Date of Admission : 12/30/2021    CC: Follow up for MORENO on CKD       Assessment and Plan     MORENO on CKD:  - diff: progressive disease vs CRS vs obstruction  - renal U/S neg for hydro, + for bladder distention  - d/c on bumex 1 mg daily   - f/u with us in 4-6 weeks post d/c  - daily labs and I/Os    Afib with RVR  - per cards  - on metoprolol     CKD IIIa:  - baseline Cr around 1.4  - presumed 2/2 chronic HTN     HTN:  - BP stable     Acute on Chronic HFpEF  CAD, hx of CABG  - ECHO w/ EF 55-60%  - rapid COVID neg  - diuretics as above      Parkinsons  Hx of CVA  PAD w/ L CEA  AAA         Interval History:  Seen and examined. Denies any cough/SOB. Denies cp, n/v/d    Current Medications: all current  Medications have been eviewed in EPIC  Review of Systems: Pertinent items are noted in HPI. Objective:  Vitals:    Vitals:    01/06/22 0827 01/06/22 0958 01/06/22 1008 01/06/22 1155   BP: 123/83 (!) 127/116  (!) 104/90   Pulse: 80 (!) 102 92 93   Resp: 16   22   Temp: 98.1 °F (36.7 °C)   98 °F (36.7 °C)   SpO2: 94%   97%   Weight:       Height:         Intake and Output:  01/06 0701 - 01/06 1900  In: 339 [P.O.:339]  Out: 900 [Urine:900]  01/04 1901 - 01/06 0700  In: 679 [P.O.:822]  Out: 2300 [Urine:2300]    Physical Examination:    General: NAD,Conversant   Neck:  Supple, no mass  Resp:  CTA  CV:  Irregular, tachy  GI:  Soft, NT, + Bowel sounds, no hepatosplenomegaly  Neurologic:  Non focal  Psych:             AAO x 3 appropriate affect     []    High complexity decision making was performed  []    Patient is at high-risk of decompensation with multiple organ involvement    Lab Data Personally Reviewed: I have reviewed all the pertinent labs, microbiology data and radiology studies during assessment.     Recent Labs     01/06/22  0511 01/05/22  0113 01/04/22  0034   * 136 132*   K 4.4 4.0 4.0    104 103   CO2 26 23 21   * 133* 143*   BUN 48* 47* 48* CREA 2.02* 2.12* 2.11*   CA 9.2 9.2 9.3   MG  --  2.3 2.3   PHOS 4.6 4.8* 4.2   ALB 3.2* 3.2*  --      Recent Labs     01/05/22  0113   WBC 4.2   HGB 7.3*   HCT 22.9*        No results found for: SDES  Lab Results   Component Value Date/Time    Culture result: MRSA NOT PRESENT 12/10/2021 06:53 AM    Culture result:  12/10/2021 06:53 AM     Screening of patient nares for MRSA is for surveillance purposes and, if positive, to facilitate isolation considerations in high risk settings. It is not intended for automatic decolonization interventions per se as regimens are not sufficiently effective to warrant routine use. Culture result: NO GROWTH 5 DAYS 12/10/2021 04:50 AM     Recent Results (from the past 24 hour(s))   RENAL FUNCTION PANEL    Collection Time: 01/06/22  5:11 AM   Result Value Ref Range    Sodium 135 (L) 136 - 145 mmol/L    Potassium 4.4 3.5 - 5.1 mmol/L    Chloride 102 97 - 108 mmol/L    CO2 26 21 - 32 mmol/L    Anion gap 7 5 - 15 mmol/L    Glucose 104 (H) 65 - 100 mg/dL    BUN 48 (H) 6 - 20 MG/DL    Creatinine 2.02 (H) 0.55 - 1.02 MG/DL    BUN/Creatinine ratio 24 (H) 12 - 20      GFR est AA 29 (L) >60 ml/min/1.73m2    GFR est non-AA 24 (L) >60 ml/min/1.73m2    Calcium 9.2 8.5 - 10.1 MG/DL    Phosphorus 4.6 2.6 - 4.7 MG/DL    Albumin 3.2 (L) 3.5 - 5.0 g/dL                   Kayla Contreras MD  [facility]   [address] [address]  Phone - [phone]   Fax - [phone]  [email or web address]. [email or web address]

## 2022-01-06 NOTE — DISCHARGE SUMMARY
Discharge Summary       PATIENT ID: Alvaro Chaudhari  MRN: [id number]   YOB: 1940    DATE OF ADMISSION: 12/30/2021  8:16 AM    DATE OF DISCHARGE: 1/7/2022    PRIMARY CARE PROVIDER: Francisco Loomis DO     ATTENDING PHYSICIAN: Shannon Clemente MD   DISCHARGING PROVIDER: Shannon Clemente MD    To contact this individual call [phone] and ask the  to page. If unavailable ask to be transferred the Adult Hospitalist Department. CONSULTATIONS: IP CONSULT TO NEPHROLOGY    PROCEDURES/SURGERIES: * No surgery found *    ADMITTING DIAGNOSES & HOSPITAL COURSE:   Per notes:    Admission Summary:   Sandra Sa a [de-identified] y.o. female who presents with sob   Ms. Jose M Leblanc is an 81-year-old female with H/o afib, CAD, CKD 3, and CVA who presents to the ER with complaints of shortness of breath. Per EMS report, her symptoms started several hours prior to arrival. Per their report, she is on 2 L of home oxygen. Her oxygen saturations on her home 2 L were 81%. . She was placed on a nonrebreather with oxygen saturations in the mid 90s. She had reported some chest pain earlier. . She denies cough, however, she is coughing during the exam. She is not sure if she has been vaccinated for COVID-19,  She denies any leg swelling. She denies any other complaints. She was given bumex 1mg iv one dose in ER.  Also gave one dose of IV levaquin and zosyn in ER  Currently pt Feels improved.              Interval history / Subjective:      1/6/2022 :   · Cont on chronic 02 at 2 LPM NC   · HR , BP acceptable control   · Stable S Cr   · Pt offers no complaints   · Ace Lan as below           Assessment & Plan:      Acute on chronic respiratory failure with hypoxia: improving - on 2 liters   Acute on chronic diastolic congestive heart failure:Acute on chronic diastolic CHF NYHA Class IV  -Suspected heart failure exacerbation  -Continue diuresis, strict I's and O's, daily weights  -Appreciate cardiology and nephrology assistance  -transition to bumex 1 mg daily   -CXR 1/2 with improved bilateral infiltrates   -Echo with normal EF  - stable afib on exam      Atrial fibrillation with RVR: persistent   -Continue Eliquis, diltiazem. Carvedilol changed to metoprolol.  Plans for metoprolol 50 mg BID on discharge  -dose adjustments per cardiology  - stable afib on exam      MORENO on CKD stage III:  -Nephrology following, appreciate recommendations  -Renal ultrasound consistent with renal medical parenchymal disease   -unable to place pittman catheter         Lab Results   Component Value Date/Time     Creatinine (POC) 1.6 (H) 02/24/2020 10:32 AM     Creatinine 2.02 (H) 01/06/2022 05:11 AM         CAD s/p CABG:  -Continue home statin, Plavix     Hypertension:  -Continue carvedilol, diltiazem, hydralazine     Hypokalemia: Replete-d  last 4.4      Anemia, macrocytic: monitor closely   B12/folate levels 1/6/2022 - can be done at SNF   PT/OT     Code status: DNR  DVT prophylaxis: [place] discussed with: Patient/Family  Anticipated Disposition: SNF  Anticipated Discharge: pending placement       Note:  Case discussed maurice Dimas, coordinator for On license of [facility],  Correction to discharge med list reviewed 8:23 AM 1/7/2022     DISCHARGE DIAGNOSES / PLAN:      1.  as above      ADDITIONAL CARE RECOMMENDATIONS:   na     PENDING TEST RESULTS:   At the time of discharge the following test results are still pending: na    FOLLOW UP APPOINTMENTS:    Follow-up Information     Follow up With Specialties Details Why Contact Info    Ze Garcia MD Cardiology On 1/13/2022 9:40 AM [address]  [address] [facility] [address] [phone]      Adonis [place], [address] [address] [place] [facility]  [address]  [facility] [address] [address]  [phone]               DIET: Cardiac Diet     ACTIVITY: Activity as tolerated    WOUND CARE: na    EQUIPMENT needed: na      DISCHARGE MEDICATIONS:  Discharge Medication List as of 1/6/2022  4:37 PM      START taking these medications    Details   metoprolol succinate (TOPROL-XL) 100 mg tablet Take 1 Tablet by mouth daily. , No Print, Disp-30 Tablet, R-0      bumetanide (BUMEX) 1 mg tablet Take 1 Tablet by mouth daily. , No Print, Disp-30 Tablet, R-0         CONTINUE these medications which have CHANGED    Details   senna-docusate (Senna with Docusate Sodium) 8.6-50 mg per tablet Take 1 Tablet by mouth nightly., No Print, Disp-30 Tablet, R-0         CONTINUE these medications which have NOT CHANGED    Details   dilTIAZem ER (DILACOR XR) 120 mg capsule Take 120 mg by mouth daily. Ordered this AM at [facility] but had not yet started., Historical Med      polyethylene glycol (MIRALAX) 17 gram packet Take 1 Packet by mouth daily as needed for Constipation for up to 30 days. , No Print, Disp-30 Each, R-0      ondansetron (ZOFRAN ODT) 4 mg disintegrating tablet Take 1 Tablet by mouth every eight (8) hours as needed for Nausea or Vomiting., No Print, Disp-30 Tablet, R-0      cilostazoL (PLETAL) 100 mg tablet Take 100 mg by mouth Before breakfast and dinner., Historical Med      apixaban (ELIQUIS) 2.5 mg tablet Take 1 Tablet by mouth two (2) times a day., Normal, Disp-60 Tablet, R-11      ARIPiprazole (ABILIFY) 20 mg tablet Take 20 mg by mouth daily. , Historical Med      hydrALAZINE (APRESOLINE) 50 mg tablet Take 1 Tablet by mouth three (3) times daily. , Normal, Disp-90 Tablet, R-5      atorvastatin (LIPITOR) 40 mg tablet Take 1 Tablet by mouth nightly., Normal, Disp-30 Tablet, R-5      carvediloL (COREG) 3.125 mg tablet Take 1 Tablet by mouth two (2) times daily (with meals). , Normal, Disp-60 Tablet, R-5      busPIRone (BUSPAR) 5 mg tablet Take 1 Tab by mouth three (3) times daily. , Normal, Disp-90 Tab, R-0      gabapentin (NEURONTIN) 100 mg capsule Take 100 mg by mouth nightly., Historical Med      melatonin 3 mg tablet Take 6 mg by mouth nightly., Historical Med      nitroglycerin (Nitrostat) 0.4 mg SL tablet 0.4 mg by SubLINGual route every five (5) minutes as needed for Chest Pain. Up to 3 doses. , Historical Med      clopidogreL (Plavix) 75 mg tab Take 75 mg by mouth daily (after breakfast). , Historical Med      carbidopa-levodopa (SINEMET)  mg per tablet Take 2 Tabs by mouth four (4) times daily. , Phone In, Disp-720 Tab, R-1      acetaminophen (TYLENOL) 325 mg tablet Take 650 mg by mouth every six (6) hours as needed for Pain or Fever., Historical Med      cyanocobalamin (VITAMIN B12) 100 mcg tablet Take 100 mcg by mouth daily. , Historical Med      Cholecalciferol, Vitamin D3, 1,000 unit cap Take 1,000 Units by mouth daily. , Historical Med      pantoprazole (PROTONIX) 40 mg tablet Take 40 mg by mouth Daily (before breakfast). Indications: h/o bleeding ulcer with nsaid, Historical Med      multivitamins-minerals-lutein (CENTRUM SILVER) tab tablet Take 1 Tablet by mouth daily. , Historical Med      DULoxetine (CYMBALTA) 60 mg capsule Take 120 mg by mouth daily. Indications: anxiousness associated with depression, Historical Med         STOP taking these medications       senna (Senna) 8.6 mg tablet Comments:   Reason for Stopping:         furosemide (LASIX) 40 mg tablet Comments:   Reason for Stopping:         amLODIPine (NORVASC) 2.5 mg tablet Comments:   Reason for Stopping:         vit C,D-Ty-mjglu-lutein-zeaxan (PRESERVISION AREDS-2) 044-410-84-1 mg-unit-mg-mg cap capsule Comments:   Reason for Stopping:                 NOTIFY YOUR PHYSICIAN FOR ANY OF THE FOLLOWING:   Fever over 101 degrees for 24 hours. Chest pain, shortness of breath, fever, chills, nausea, vomiting, diarrhea, change in mentation, falling, weakness, bleeding. Severe pain or pain not relieved by medications. Or, any other signs or symptoms that you may have questions about.     DISPOSITION:    Home With:   OT  PT  HH  RN      x Long term SNF/Inpatient Rehab    Independent/assisted living    Hospice    Other:       PATIENT CONDITION AT DISCHARGE:     Functional status   x Poor     Deconditioned     Independent      Cognition     Lucid    x Forgetful     Dementia      Catheters/lines (plus indication)    Sue     PICC     PEG     None      Code status     Full code    x DNR      PHYSICAL EXAMINATION AT DISCHARGE:  General:          Alert, cooperative, no distress, appears stated age. HEENT:           Atraumatic, anicteric sclerae, pink conjunctivae                          No oral ulcers, mucosa moist, throat clear, dentition fair  Neck:               Supple, symmetrical  Lungs:             Clear to auscultation bilaterally. No Wheezing or Rhonchi. No rales. Chest wall:      No tenderness  No Accessory muscle use. Heart:              iRegular  rhythm,  No g  Abdomen:        Soft, non-tender. Not distended. Bowel sounds normal  Extremities:     No cyanosis. No clubbing,                            Skin turgor normal, Capillary refill normal  Skin:                Not pale. Not Jaundiced  No rashes   Psych:             Not anxious or agitated.   Neurologic:      Alert, moves all extremities, answers questions appropriately and responds to commands       CHRONIC MEDICAL DIAGNOSES:  Problem List as of 1/6/2022 Date Reviewed: 1/6/2022          Codes Class Noted - Resolved    CAP (community acquired pneumonia) ICD-10-CM: J18.9  ICD-9-CM: 486  12/10/2021 - Present        Acute on chronic respiratory failure with hypoxia ([facility] ICD-10-CM: J96.21  ICD-9-CM: 518.84, 799.02  12/10/2021 - Present        Cellulitis and abscess of lower extremity ICD-10-CM: L03.119, L02.419  ICD-9-CM: 682.6  10/29/2021 - Present        SOB (shortness of breath) ICD-10-CM: R06.02  ICD-9-CM: 786.05  4/23/2021 - Present        UTI (urinary tract infection) ICD-10-CM: N39.0  ICD-9-CM: 599.0  4/9/2021 - Present        Weakness ICD-10-CM: R53.1  ICD-9-CM: 780.79  5/4/2020 - Present        Generalized weakness ICD-10-CM: R53.1  ICD-9-CM: 780.79  4/30/2020 - Present        Carotid artery stenosis, symptomatic, left ICD-10-CM: Y80.44  ICD-9-CM: 433.10  7/26/2019 - Present        HTN (hypertension) ICD-10-CM: I10  ICD-9-CM: 401.9  7/17/2019 - Present        Acute ischemic stroke [facility]) ICD-10-CM: I63.9  ICD-9-CM: 434.91  7/12/2019 - Present        Fall ICD-10-CM: W19. [facility]  ICD-9-CM: E888.9  12/24/2018 - Present        CKD (chronic kidney disease), stage III ([facility] ICD-10-CM: N18.30  ICD-9-CM: 585.3  7/8/2015 - Present        Edema ICD-10-CM: R60.9  ICD-9-CM: 782.3  5/6/2015 - Present        Diabetes mellitus ([facility] ICD-10-CM: E11.9  ICD-9-CM: 250.00  5/6/2015 - Present        Postoperative anemia due to acute blood loss ICD-10-CM: D62  ICD-9-CM: 285.1  2/14/2015 - Present        S/P CABG (coronary artery bypass graft) ICD-10-CM: Z95.1  ICD-9-CM: V45.81  2/13/2015 - Present        Syncope ICD-10-CM: R55  ICD-9-CM: 780.2  2/9/2015 - Present        Bradycardia ICD-10-CM: R00.1  ICD-9-CM: 427.89  2/9/2015 - Present        Acute kidney injury ([facility] ICD-10-CM: N17.9  ICD-9-CM: 584.9  2/9/2015 - Present        Anemia ICD-10-CM: D64.9  ICD-9-CM: 285.9  9/9/2014 - Present        GI bleed ICD-10-CM: K92.2  ICD-9-CM: 578.9  9/9/2014 - Present        AF (atrial fibrillation) (HCC) ICD-10-CM: I48.91  ICD-9-CM: 427.31  6/20/2014 - Present        Hypotension ICD-10-CM: I95.9  ICD-9-CM: 458.9  6/3/2014 - Present        Coronary artery disease ICD-10-CM: I25.10  ICD-9-CM: 414.00  6/3/2014 - Present    Overview Signed 2/10/2015 11:06 AM by Chemo Long     2007 PCI x2 to LAD with STEPHAN             S/P coronary artery stent placement ICD-10-CM: Z95.5  ICD-9-CM: V45.82  6/3/2014 - Present        Renal failure ICD-10-CM: N19  ICD-9-CM: 295  6/3/2014 - Present        Elevated troponin ICD-10-CM: R77.8  ICD-9-CM: 790.6  6/3/2014 - Present        Pericardial effusion ICD-10-CM: I31.3  ICD-9-CM: 423.9  6/3/2014 - Present        Lactic acidosis ICD-10-CM: E87.2  ICD-9-CM: 276.2  6/3/2014 - Present        RESOLVED: CHF (congestive heart failure) ([facility] ICD-10-CM: I50.9  ICD-9-CM: 428.0  9/22/2020 - 8/3/2021        RESOLVED: CHF exacerbation ([facility] ICD-10-CM: I50.9  ICD-9-CM: 428.0  9/25/2020 - 1/6/2022              Greater than 30  minutes were spent with the patient on counseling and coordination of care    Signed:   Ricardo Dhillon MD  1/7/2022  3:02 PM

## 2022-01-06 NOTE — PROGRESS NOTES
[address] [facility] Adult  Hospitalist Group                                                                                          Hospitalist Progress Note  Bubba Coleman MD  Answering service: [phone] from in house phone        Date of Service:  2022  NAME:  Omayra Mckinnon  :  1940  MRN:  [id number]      Admission Summary:   Omayra Mckinnon is a [de-identified] y.o. female who presents with sob   MsRenny Graff is an 78-year-old female with H/o afib, CAD, CKD 3, and CVA who presents to the ER with complaints of shortness of breath. Per EMS report, her symptoms started several hours prior to arrival. Per their report, she is on 2 L of home oxygen. Her oxygen saturations on her home 2 L were 81%. . She was placed on a nonrebreather with oxygen saturations in the mid 90s. She had reported some chest pain earlier. . She denies cough, however, she is coughing during the exam. She is not sure if she has been vaccinated for COVID-19,  She denies any leg swelling. She denies any other complaints. She was given bumex 1mg iv one dose in ER. Also gave one dose of IV levaquin and zosyn in ER  Currently pt Feels improved.        Interval history / Subjective:     2022 :    Cont on chronic 02 at 2 LPM NC    HR , BP acceptable control    Stable S Cr    Pt offers no complaints    Else as below           Assessment & Plan:     Acute on chronic respiratory failure with hypoxia: improving - on 2 liters   Acute on chronic diastolic congestive heart failure:  -Suspected heart failure exacerbation  -Continue diuresis, strict I's and O's, daily weights  -Appreciate cardiology and nephrology assistance  -transition to bumex 1 mg daily   -CXR 1/2 with improved bilateral infiltrates   -Echo with normal EF  - stable afib on exam     Atrial fibrillation with RVR: persistent   -Continue Eliquis, diltiazem. Carvedilol changed to metoprolol.  Plans for metoprolol 50 mg BID on discharge  -dose adjustments per cardiology  - stable afib on exam     MORENO on CKD stage III:  -Nephrology following, appreciate recommendations  -Renal ultrasound consistent with renal medical parenchymal disease   -unable to place pittman catheter   Lab Results   Component Value Date/Time    Creatinine (POC) 1.6 (H) 02/24/2020 10:32 AM    Creatinine 2.02 (H) 01/06/2022 05:11 AM        CAD s/p CABG:  -Continue home statin, Plavix    Hypertension:  -Continue carvedilol, diltiazem, hydralazine    Hypokalemia: Replete-d  last 4.4     Anemia, macrocytic: monitor closely   B12/folate levels 1/6/2022   PT/OT    Code status: DNR  DVT prophylaxis: [place] discussed with: Patient/Family  Anticipated Disposition: SNF  Anticipated Discharge: pending placement      Hospital Problems  Date Reviewed: 1/1/2022          Codes Class Noted POA    CHF exacerbation ([facility] [address] ICD-10-CM: I50.9  ICD-9-CM: 428.0  9/25/2020 Unknown                Review of Systems:   Negative unless stated above      Vital Signs:    Last 24hrs VS reviewed since prior progress note. Most recent are:  Visit Vitals  /83 (BP 1 Location: Right upper arm, BP Patient Position: At rest;Sitting)   Pulse 80   Temp 98.1 °F (36.7 °C)   Resp 16   Ht 5' 5\" (1.651 m)   Wt 74.5 kg (164 lb 3.9 oz)   SpO2 94%   BMI 27.33 kg/m²         Intake/Output Summary (Last 24 hours) at 1/6/2022 0948  Last data filed at 1/6/2022 0405  Gross per 24 hour   Intake 462 ml   Output 1450 ml   Net -988 ml        Physical Examination:     I had a face to face encounter with this patient and independently examined them on 1/6/2022 as outlined below:          Constitutional:  No acute distress, cooperative, pleasant    ENT:  Oral mucosa moist, oropharynx benign. Resp:  diminished bilaterally. No accessory muscle use   CV:  normal rate, irregularly irregular, no murmurs, gallops, rubs    GI:  Soft, non distended, non tender.  normoactive bowel sounds, no hepatosplenomegaly     Musculoskeletal:  No edema, blisters bilaterally, warm, 2+ pulses throughout    Neurologic:  Moves all extremities            Data Review:    Review and/or order of clinical lab test  Review and/or order of tests in the radiology section of CPT  Review and/or order of tests in the medicine section of CPT      Labs:     Recent Labs     01/05/22  0113   WBC 4.2   HGB 7.3*   HCT 22.9*        Recent Labs     01/06/22  0511 01/05/22  0113 01/04/22  0034   * 136 132*   K 4.4 4.0 4.0    104 103   CO2 26 23 21   BUN 48* 47* 48*   CREA 2.02* 2.12* 2.11*   * 133* 143*   CA 9.2 9.2 9.3   MG  --  2.3 2.3   PHOS 4.6 4.8* 4.2     Recent Labs     01/06/22  0511 01/05/22  0113   ALB 3.2* 3.2*     No results for input(s): INR, PTP, APTT, INREXT, INREXT in the last 72 hours. No results for input(s): FE, TIBC, PSAT, FERR in the last 72 hours. Lab Results   Component Value Date/Time    Folate 7.8 12/14/2021 04:40 AM      No results for input(s): PH, PCO2, PO2 in the last 72 hours. No results for input(s): CPK, CKNDX, TROIQ in the last 72 hours.     No lab exists for component: CPKMB  Lab Results   Component Value Date/Time    Cholesterol, total 148 09/23/2020 04:27 AM    HDL Cholesterol 52 09/23/2020 04:27 AM    LDL, calculated 83.8 09/23/2020 04:27 AM    Triglyceride 61 09/23/2020 04:27 AM    CHOL/HDL Ratio 2.8 09/23/2020 04:27 AM     Lab Results   Component Value Date/Time    Glucose (POC) 108 12/21/2021 06:15 AM    Glucose (POC) 148 (H) 12/20/2021 09:54 PM    Glucose (POC) 156 (H) 12/18/2021 11:53 AM    Glucose (POC) 96 12/18/2021 10:18 AM    Glucose (POC) 120 (H) 04/24/2021 04:54 PM     Lab Results   Component Value Date/Time    Color YELLOW/STRAW 12/30/2021 08:23 PM    Appearance CLOUDY (A) 12/30/2021 08:23 PM    Specific gravity 1.017 12/30/2021 08:23 PM    pH (UA) 6.0 12/30/2021 08:23 PM    Protein 100 (A) 12/30/2021 08:23 PM    Glucose Negative 12/30/2021 08:23 PM    Ketone Negative 12/30/2021 08:23 PM    Bilirubin Negative 12/30/2021 08:23 PM    Urobilinogen 1.0 12/30/2021 08:23 PM    Nitrites Negative 12/30/2021 08:23 PM    Leukocyte Esterase MODERATE (A) 12/30/2021 08:23 PM    Epithelial cells FEW 12/30/2021 08:23 PM    Bacteria 1+ (A) 12/30/2021 08:23 PM    WBC 0-4 12/30/2021 08:23 PM    RBC 0-5 12/30/2021 08:23 PM         Medications Reviewed:     Current Facility-Administered Medications   Medication Dose Route Frequency    bumetanide (BUMEX) tablet 1 mg  1 mg Oral DAILY    metoprolol tartrate (LOPRESSOR) tablet 25 mg  25 mg Oral Q6H    apixaban (ELIQUIS) tablet 2.5 mg  2.5 mg Oral BID    ARIPiprazole (ABILIFY) tablet 20 mg  20 mg Oral DAILY    atorvastatin (LIPITOR) tablet 40 mg  40 mg Oral QHS    busPIRone (BUSPAR) tablet 5 mg  5 mg Oral TID    carbidopa-levodopa (SINEMET)  mg per tablet 2 Tablet  2 Tablet Oral QID    cholecalciferol (VITAMIN D3) (1000 Units /25 mcg) tablet 1,000 Units  1,000 Units Oral DAILY    cilostazoL (PLETAL) tablet 100 mg  100 mg Oral ACB&D    clopidogreL (PLAVIX) tablet 75 mg  75 mg Oral DAILY AFTER BREAKFAST    DULoxetine (CYMBALTA) capsule 120 mg  120 mg Oral DAILY    cyanocobalamin (VITAMIN B12) tablet 100 mcg  100 mcg Oral DAILY    gabapentin (NEURONTIN) capsule 100 mg  100 mg Oral QHS    melatonin tablet 6 mg  6 mg Oral QHS    pantoprazole (PROTONIX) tablet 40 mg  40 mg Oral ACB    nitroglycerin (NITROSTAT) tablet 0.4 mg  0.4 mg SubLINGual Q5MIN PRN    polyethylene glycol (MIRALAX) packet 17 g  17 g Oral DAILY PRN    senna-docusate (PERICOLACE) 8.6-50 mg per tablet 1 Tablet  1 Tablet Oral QHS    dilTIAZem ER (CARDIZEM CD) capsule 120 mg  120 mg Oral DAILY    hydrALAZINE (APRESOLINE) tablet 50 mg  50 mg Oral TID     ______________________________________________________________________  EXPECTED LENGTH OF STAY: 3d 19h  ACTUAL LENGTH OF STAY:          7                 Brad Dodge MD

## 2022-01-06 NOTE — PROGRESS NOTES
Problem: Mobility Impaired (Adult and Pediatric)  Goal: *Acute Goals and Plan of Care (Insert Text)  Description: FUNCTIONAL STATUS PRIOR TO ADMISSION: The patient was functional at the wheelchair level and was modified independent for transfers to the wheelchair per pt report. Pt denies any falls in the last year. HOME SUPPORT PRIOR TO ADMISSION: The patient lived in [facility] due to her apartment lease ending in November 2021 and is looking for new living arrangements. Pt reports she has a niece in the area that assist her. Physical Therapy Goals  Initiated 1/2/2022  1. Patient will move from supine to sit and sit to supine  in bed with modified independence within 7 day(s). 2.  Patient will transfer from bed to chair and chair to bed with minimal assistance/contact guard assist using the least restrictive device within 7 day(s). 3.  Patient will perform sit to stand with minimal assistance/contact guard assist within 7 day(s). Outcome: Progressing Towards Goal   PHYSICAL THERAPY TREATMENT  Patient: Sherren Providence (52 y.o. female)  Date: 1/6/2022  Diagnosis: CHF exacerbation ([facility] [address] [I50.9] <principal problem not specified>       Precautions: Fall  Chart, physical therapy assessment, plan of care and goals were reviewed. ASSESSMENT  Patient continues with skilled PT services and is progressing towards goals. She continues to demonstrate -120's seated EOB. Patient demonstrates improved overall mobility requiring less assistance. She is able to tolerate upright sitting EOB without foot support to complete ADLs. Patient tolerates upright sitting for about 20 minutes. She demonstrates stable O2 sats on 2L/min. Current Level of Function Impacting Discharge (mobility/balance):  Mod A supine<>sit, CGA rolling    Other factors to consider for discharge: medical stability, decreased strength/endurance, increased need for assistance          PLAN :  Patient continues to benefit from skilled intervention to address the above impairments. Continue treatment per established plan of care. to address goals. Recommendation for discharge: (in order for the patient to meet his/her long term goals)  Therapy up to 5 days/week in SNF setting    This discharge recommendation:  Has been made in collaboration with the attending provider and/or case management    IF patient discharges home will need the following DME: to be determined (TBD)       SUBJECTIVE:   Patient stated I don't want to get up.     OBJECTIVE DATA SUMMARY:   Critical Behavior:  Neurologic State: Alert,Appropriate for age  Orientation Level: Oriented X4  Cognition: Follows commands  Safety/Judgement: Awareness of environment  Functional Mobility Training:  Bed Mobility:  Rolling: Stand-by assistance  Supine to Sit: Moderate assistance  Sit to Supine: Minimum assistance  Scooting: Moderate assistance        Transfers:                                   Balance:  Sitting: Intact; Without support  Sitting - Static: Good (unsupported)  Sitting - Dynamic: Good (unsupported)  Ambulation/Gait Training:                                                        Stairs: Therapeutic Exercises:   Pain Rating:      Activity Tolerance:   Fair    After treatment patient left in no apparent distress:   Supine in bed, Call bell within reach, and Side rails x 3    COMMUNICATION/COLLABORATION:   The patients plan of care was discussed with: Registered nurse.      Merari Cevallos, PT   Time Calculation: 35 mins

## 2022-01-06 NOTE — PROGRESS NOTES
Transition of Care  1. RUR 29% High  2. Disposition Return to [facility]  3. DME NA  4. Transportation [employer] @ [phone]  5. Follow Up PCP, Specialist      ELIZABETH received phone call from [facility]. Auth approved for three days with start date of 1/5/22, next review date 1/7/22. Assigned care coordinator is Sabiha Lemus, updates to be faxed to [phone]. Auth [id number], [facility] Ref# [id number]. CM left message for Elise with Samantha [phone]. CM to monitor. Amanda Flow, MS      2:13 CM received phone call from [facility], patient has a bed. CM messaged attending    Transition of Care Plan to SNF/Rehab    SNF/Rehab Transition:  Patient has been accepted to [facility] and meets criteria for admission. Patient will transported by Joseluis Lucio and expected to leave at 1730. Communication to Patient/Family:  Met with patient and they are agreeable to the transition plan. Communication to SNF/Rehab:  Bedside RN has been notified to update the transition plan to the facility and call report [phone], [facility] [place] [facility]. Discharge information has been updated on the AVS.     Discharge instructions available in [email or web address]. Nursing Please include all hard scripts for controlled substances, med rec and dc summary, and AVS in packet.      Reviewed and confirmed with Elise facility can manage the patient care needs for the following:     SNF/Rehab Transition:  Patient to follow-up with PCP/Specialist    Katherin Jackman with (X) only those applicable:    Medication:  []  Medications will be available at the facility  []  IV Antibiotics   []  Controlled Substance - hard copy to be sent with patient   []  Weekly Labs   Documents:  [x] Hard RX  [x] MAR  [x] Kardex  [x] AVS  [x]Transfer Summary  [x]Discharge   Equipment:  []  CPAP/BiPAP  []  Wound Vacuum  []  Sue or Urinary Device  []  PICC/Central Line  []  Nebulizer  []  Ventilator   Treatment:  []Isolation (for MRSA, VRE, etc.)  []Surgical Drain Management  []Tracheostomy Care  []Dressing Changes  []Dialysis with transportation and chair time   []PEG Care  []Oxygen  []Daily Weights for Heart Failure   Dietary:  []Any diet limitations  []Tube Feedings   []Total Parenteral Management (TPN)   Eligible for Medicaid Long Term Services and Supports  Yes:  [] Eligible for medical assistance or will become eligible within 180 days and UAI completed. [] Provider/Patient and/or support system has requested screening. [] UAI copy provided to patient or responsible party. [] UAI unavailable at discharge will send once processed to SNF provider. [] UAI unavailable at discharged mailed to patient  No:   [] Private pay and is not financially eligible for Medicaid within the next 180 days. [] Reside out-of-state. [] A residents of a state owned/operated facility that is licensed  by [address] [facility] or [facility]  [] Enrollment in [facility] hospice services  [] [address]  [] Patient /Family declines to have screening completed or provide financial information for screening     Financial Resources:  Medicaid    [] Initiated and application pending   [] Full coverage     Advanced Care Plan:  []Surrogate Decision Maker of Care  []POA  [x]Communicated Code Status DNR   Other  Medicare pt has received, reviewed, and signed 2nd IM letter informing them of their right to appeal the discharge. Signed copy has been placed on pt bedside chart.     * CM notified patient's niece Dustin Culver [phone] and left message with other niroxanne Chiang [address] [facility] [address] [place] [address]

## 2022-01-06 NOTE — PROGRESS NOTES
TRANSFER - OUT REPORT:    Verbal report given to 6E nurse(name) on [employer]  being transferred to 6E(unit) for routine progression of care       Report consisted of patients Situation, Background, Assessment and   Recommendations(SBAR). Information from the following report(s) SBAR, Kardex, [facility], Recent Results and Cardiac Rhythm SNR was reviewed with the receiving nurse. Lines:   Peripheral IV 12/30/21 Left Antecubital (Active)   Site Assessment Clean, dry, & intact 01/06/22 0827   Phlebitis Assessment 0 01/06/22 0827   Infiltration Assessment 0 01/06/22 0827   Dressing Status Clean, dry, & intact 01/06/22 0827   Dressing Type Tape;Transparent 01/06/22 0827   Hub Color/Line Status Pink;Capped 01/06/22 0827   Action Taken Open ports on tubing capped 01/06/22 0827   Alcohol Cap Used Yes 01/06/22 0827        Opportunity for questions and clarification was provided.       Patient transported with:   [employer]

## 2022-01-06 NOTE — DISCHARGE INSTRUCTIONS
Patient Education      Bumetanide (Bumex) - (By mouth)   Why this medicine is used:   Treats edema (fluid retention) and high blood pressure. Contact a nurse or doctor right away if you have:  · Blistering, peeling, red skin rash  · Lightheadedness, fainting  · Dry mouth, increased thirst, problems urinating  · Nausea or vomiting  · Hearing loss, ringing in the ears     Common side effects:  · Muscle cramps  © 2017 [facility] Information is for End User's use only and may not be sold, redistributed or otherwise used for commercial purposes. Patient Education      Metoprolol (Lopressor, Toprol XL) - (By mouth)   Why this medicine is used:   Treats high blood pressure, chest pain, and heart failure. Contact a nurse or doctor right away if you have:  · Lightheadedness, dizziness, fainting  · Rapid weight gain; swelling in your hands, ankles, or feet     Common side effects:  · Mild dizziness  · Tiredness  © 2017 [facility] Information is for End User's use only and may not be sold, redistributed or otherwise used for commercial purposes. Exergyn Activation    Thank you for requesting access to Exergyn. Please follow the instructions below to securely access and download your online medical record. Exergyn allows you to send messages to your doctor, view your test results, renew your prescriptions, schedule appointments, and more. How Do I Sign Up? 1. In your internet browser, go to [email or web address]  2. Click on the First Time User? Click Here link in the Sign In box. You will be redirect to the New Member Sign Up page. 3. Enter your Exergyn Access Code exactly as it appears below. You will not need to use this code after youve completed the sign-up process. If you do not sign up before the expiration date, you must request a new code.     Exergyn Access Code: Abiliophillip Puentes  Expires: 1/28/2022 12:25 PM (This is the date your Exergyn access code will )    4. Enter the last four digits of your Social Security Number (xxxx) and Date of Birth (mm/dd/yyyy) as indicated and click Submit. You will be taken to the next sign-up page. 5. Create a Angelantoni ID. This will be your Angelantoni login ID and cannot be changed, so think of one that is secure and easy to remember. 6. Create a Angel[employer]oni password. You can change your password at any time. 7. Enter your Password Reset Question and Answer. This can be used at a later time if you forget your password. 8. Enter your e-mail address. You will receive e-mail notification when new information is available in [address] 9. Click Sign Up. You can now view and download portions of your medical record. 10. Click the Download Summary menu link to download a portable copy of your medical information. Additional Information    If you have questions, please visit the Frequently Asked Questions section of the Angelantoni website at [email or web address]/. Remember, Angelantoni is NOT to be used for urgent needs. For medical emergencies, dial 911. Patient Education        Learning About Heart Failure  What is heart failure? Heart failure means that your heart muscle doesn't pump as much blood as your body needs. Failure doesn't mean that your heart has stopped. It means that your heart isn't pumping as well as it should. Because your heart cannot pump well, your body tries to make up for it. To do this:  · Your body holds on to salt and water. This increases the amount of blood in your bloodstream.  · Your heart beats faster. · Your heart might get bigger. Your body has an amazing ability to make up for heart failure. It may do such a good job that you don't know you have a disease. But at some point, your heart and body will no longer be able to keep up. Then fluid starts to build up in your lungs and other parts of your body. This fluid buildup is called congestion.  It's why some doctors call the disease congestive heart failure. What can you expect when you have heart failure? Heart failure is a lifelong (chronic) disease. Treatment may be able to slow the disease and help you feel better. But heart failure tends to get worse over time. Despite this, there are many steps you can take to feel better and stay healthy longer. Early on, your symptoms may not be too bad. As heart failure gets worse, symptoms typically get worse, and you may need to limit your activities. Heart failure can also get worse suddenly. If this happens, you need emergency care. Then, after treatment, your symptoms may go back to being stable (which means they stay the same) for a long time. Heart failure can lead to other health problems, such as heart rhythm problems. Over time, your treatment options may change, especially as your symptoms get worse. You may want to think about what kind of care you want at the end of your life. What are the symptoms? Symptoms of heart failure start to happen when your heart can't pump enough blood to the rest of your body. In the early stages of heart failure, you may:  · Feel tired easily. · Be short of breath when you exert yourself. · Feel like your heart is pounding or racing (palpitations). · Feel weak or dizzy. As heart failure gets worse, fluid starts to build up in your lungs and other parts of your body. This may cause you to:  · Feel short of breath even at rest.  · Have swelling (edema), especially in your legs, ankles, and feet. · Gain weight. This may happen over just a day or two, or more slowly. · Cough or wheeze, especially when you lie down. · Feel bloated or sick to your stomach. How is heart failure treated? Heart failure is treated with medicines and steps you take to make lifestyle changes and check your symptoms. Treatment can slow the disease and help you feel better and live longer. · You'll probably take several medicines.   · You'll take steps to care for yourself at home. Lakeshia Pearson watch for changes in your symptoms. You may need to make lifestyle changes, such as limiting sodium, getting regular exercise, not smoking, and eating healthy foods. · You might attend cardiac rehabilitation (rehab) to get education and support that help you make lifestyle changes and stay as healthy as possible. · You may get a heart device. A pacemaker helps your heart pump blood. An ICD can stop abnormal heart rhythms. · As heart failure gets worse, palliative care can help improve your quality of life. You can do advance care planning to decide what kind of care you want at the end of your life. How can you care for yourself? There are many steps you can take to feel better and live longer. They can help you stay active and enjoy life. Take your medicine the right way. Avoid medicines that can make your symptoms worse. Check your weight and symptoms every day. Know what to do if your symptoms get worse. Limit sodium. This helps keep fluid from building up. It may help you feel better. Be active. Exercise regularly, but don't exercise too hard. Be heart-healthy. Eat healthy foods, stay at a healthy weight, limit alcohol, and don't smoke. Stay as healthy as possible. Manage other health problems such as diabetes and high blood pressure. Avoid colds and flu, get help for depression and anxiety, and manage stress. If you think you may have a problem with alcohol or drug use, talk to your doctor. Ask your doctor if you need to limit the amount of fluids you drink. Follow-up care is a key part of your treatment and safety. Be sure to make and go to all appointments, and call your doctor if you are having problems. It's also a good idea to know your test results and keep a list of the medicines you take. Where can you learn more?   Go to http://www.gray.com/  Enter [id number] in the search box to learn more about \"Learning About Heart Failure. \"  Current as of: April 29, 2021               Content Version: 13.0  © 7567-3528 Healthwise, [facility]. Care instructions adapted under license by [employer] (which disclaims liability or warranty for this information). If you have questions about a medical condition or this instruction, always ask your healthcare professional. [place] [address] any warranty or liability for your use of this information.

## 2022-01-07 ENCOUNTER — HOSPITAL ENCOUNTER (INPATIENT)
Age: 82
LOS: 6 days | Discharge: NURSING, REHAB OR LONG-TERM CARE | DRG: 291 | End: 2022-01-14
Attending: EMERGENCY MEDICINE | Admitting: STUDENT IN AN ORGANIZED HEALTH CARE EDUCATION/TRAINING PROGRAM
Payer: MEDICARE

## 2022-01-07 ENCOUNTER — TELEPHONE (OUTPATIENT)
Dept: CASE MANAGEMENT | Age: 82
End: 2022-01-07

## 2022-01-07 ENCOUNTER — PATIENT OUTREACH (OUTPATIENT)
Dept: CASE MANAGEMENT | Age: 82
End: 2022-01-07

## 2022-01-07 ENCOUNTER — APPOINTMENT (OUTPATIENT)
Dept: GENERAL RADIOLOGY | Age: 82
DRG: 291 | End: 2022-01-07
Attending: EMERGENCY MEDICINE
Payer: MEDICARE

## 2022-01-07 DIAGNOSIS — I48.20 CHRONIC ATRIAL FIBRILLATION (HCC): ICD-10-CM

## 2022-01-07 DIAGNOSIS — I50.9 ACUTE ON CHRONIC CONGESTIVE HEART FAILURE, UNSPECIFIED HEART FAILURE TYPE (HCC): ICD-10-CM

## 2022-01-07 DIAGNOSIS — I50.20 SYSTOLIC CONGESTIVE HEART FAILURE, UNSPECIFIED HF CHRONICITY (HCC): ICD-10-CM

## 2022-01-07 DIAGNOSIS — J96.21 ACUTE ON CHRONIC RESPIRATORY FAILURE WITH HYPOXIA (HCC): Primary | ICD-10-CM

## 2022-01-07 LAB
ALBUMIN SERPL-MCNC: 3.6 G/DL (ref 3.5–5)
ALBUMIN/GLOB SERPL: 1.1 {RATIO} (ref 1.1–2.2)
ALP SERPL-CCNC: 101 U/L (ref 45–117)
ALT SERPL-CCNC: 15 U/L (ref 12–78)
ANION GAP SERPL CALC-SCNC: 5 MMOL/L (ref 5–15)
AST SERPL-CCNC: 14 U/L (ref 15–37)
BASE EXCESS BLD CALC-SCNC: 0.5 MMOL/L
BASOPHILS # BLD: 0 K/UL (ref 0–0.1)
BASOPHILS NFR BLD: 0 % (ref 0–1)
BDY SITE: ABNORMAL
BILIRUB SERPL-MCNC: 0.5 MG/DL (ref 0.2–1)
BUN SERPL-MCNC: 45 MG/DL (ref 6–20)
BUN/CREAT SERPL: 21 (ref 12–20)
CALCIUM SERPL-MCNC: 9 MG/DL (ref 8.5–10.1)
CHLORIDE SERPL-SCNC: 102 MMOL/L (ref 97–108)
CO2 SERPL-SCNC: 27 MMOL/L (ref 21–32)
COMMENT, HOLDF: NORMAL
COVID-19 RAPID TEST, COVR: NOT DETECTED
CREAT SERPL-MCNC: 2.15 MG/DL (ref 0.55–1.02)
DIFFERENTIAL METHOD BLD: ABNORMAL
EOSINOPHIL # BLD: 0.1 K/UL (ref 0–0.4)
EOSINOPHIL NFR BLD: 1 % (ref 0–7)
ERYTHROCYTE [DISTWIDTH] IN BLOOD BY AUTOMATED COUNT: 13.7 % (ref 11.5–14.5)
GAS FLOW.O2 O2 DELIVERY SYS: ABNORMAL L/MIN
GLOBULIN SER CALC-MCNC: 3.2 G/DL (ref 2–4)
GLUCOSE SERPL-MCNC: 229 MG/DL (ref 65–100)
HCO3 BLD-SCNC: 25.4 MMOL/L (ref 22–26)
HCT VFR BLD AUTO: 29.8 % (ref 35–47)
HGB BLD-MCNC: 9.5 G/DL (ref 11.5–16)
IMM GRANULOCYTES # BLD AUTO: 0.1 K/UL (ref 0–0.04)
IMM GRANULOCYTES NFR BLD AUTO: 1 % (ref 0–0.5)
LACTATE SERPL-SCNC: 1.6 MMOL/L (ref 0.4–2)
LYMPHOCYTES # BLD: 0.5 K/UL (ref 0.8–3.5)
LYMPHOCYTES NFR BLD: 5 % (ref 12–49)
MCH RBC QN AUTO: 32.6 PG (ref 26–34)
MCHC RBC AUTO-ENTMCNC: 31.9 G/DL (ref 30–36.5)
MCV RBC AUTO: 102.4 FL (ref 80–99)
MONOCYTES # BLD: 0.5 K/UL (ref 0–1)
MONOCYTES NFR BLD: 6 % (ref 5–13)
NEUTS SEG # BLD: 7.8 K/UL (ref 1.8–8)
NEUTS SEG NFR BLD: 87 % (ref 32–75)
NRBC # BLD: 0 K/UL (ref 0–0.01)
NRBC BLD-RTO: 0 PER 100 WBC
O2/TOTAL GAS SETTING VFR VENT: 50 %
PCO2 BLD: 41.3 MMHG (ref 35–45)
PEEP RESPIRATORY: 8 CMH2O
PH BLD: 7.4 [PH] (ref 7.35–7.45)
PIP ISTAT,IPIP: 12
PLATELET # BLD AUTO: 345 K/UL (ref 150–400)
PMV BLD AUTO: 8.8 FL (ref 8.9–12.9)
PO2 BLD: 97 MMHG (ref 80–100)
POTASSIUM SERPL-SCNC: 4.9 MMOL/L (ref 3.5–5.1)
PROT SERPL-MCNC: 6.8 G/DL (ref 6.4–8.2)
RBC # BLD AUTO: 2.91 M/UL (ref 3.8–5.2)
RBC MORPH BLD: ABNORMAL
SAMPLES BEING HELD,HOLD: NORMAL
SAO2 % BLD: 97.5 % (ref 92–97)
SODIUM SERPL-SCNC: 134 MMOL/L (ref 136–145)
SOURCE, COVRS: NORMAL
SPECIMEN TYPE: ABNORMAL
WBC # BLD AUTO: 9 K/UL (ref 3.6–11)

## 2022-01-07 PROCEDURE — 87040 BLOOD CULTURE FOR BACTERIA: CPT

## 2022-01-07 PROCEDURE — 80053 COMPREHEN METABOLIC PANEL: CPT

## 2022-01-07 PROCEDURE — 82803 BLOOD GASES ANY COMBINATION: CPT

## 2022-01-07 PROCEDURE — 5A09357 ASSISTANCE WITH RESPIRATORY VENTILATION, LESS THAN 24 CONSECUTIVE HOURS, CONTINUOUS POSITIVE AIRWAY PRESSURE: ICD-10-PCS | Performed by: STUDENT IN AN ORGANIZED HEALTH CARE EDUCATION/TRAINING PROGRAM

## 2022-01-07 PROCEDURE — 96374 THER/PROPH/DIAG INJ IV PUSH: CPT

## 2022-01-07 PROCEDURE — 87635 SARS-COV-2 COVID-19 AMP PRB: CPT

## 2022-01-07 PROCEDURE — 71045 X-RAY EXAM CHEST 1 VIEW: CPT

## 2022-01-07 PROCEDURE — 74011000250 HC RX REV CODE- 250: Performed by: EMERGENCY MEDICINE

## 2022-01-07 PROCEDURE — 99285 EMERGENCY DEPT VISIT HI MDM: CPT

## 2022-01-07 PROCEDURE — 94660 CPAP INITIATION&MGMT: CPT

## 2022-01-07 PROCEDURE — 83605 ASSAY OF LACTIC ACID: CPT

## 2022-01-07 PROCEDURE — 36600 WITHDRAWAL OF ARTERIAL BLOOD: CPT

## 2022-01-07 PROCEDURE — 85025 COMPLETE CBC W/AUTO DIFF WBC: CPT

## 2022-01-07 PROCEDURE — 93005 ELECTROCARDIOGRAM TRACING: CPT

## 2022-01-07 RX ORDER — ARIPIPRAZOLE 20 MG/1
20 TABLET ORAL DAILY
Qty: 30 TABLET | Refills: 0 | Status: SHIPPED | OUTPATIENT
Start: 2022-01-07

## 2022-01-07 RX ORDER — BUSPIRONE HYDROCHLORIDE 5 MG/1
5 TABLET ORAL 3 TIMES DAILY
Qty: 90 TABLET | Refills: 0 | Status: SHIPPED | OUTPATIENT
Start: 2022-01-07 | End: 2022-02-11

## 2022-01-07 RX ORDER — BUMETANIDE 0.25 MG/ML
1 INJECTION INTRAMUSCULAR; INTRAVENOUS ONCE
Status: COMPLETED | OUTPATIENT
Start: 2022-01-07 | End: 2022-01-07

## 2022-01-07 RX ORDER — DULOXETIN HYDROCHLORIDE 60 MG/1
120 CAPSULE, DELAYED RELEASE ORAL DAILY
Qty: 30 CAPSULE | Refills: 0 | Status: SHIPPED | OUTPATIENT
Start: 2022-01-07 | End: 2022-02-18

## 2022-01-07 RX ADMIN — BUMETANIDE 1 MG: 0.25 INJECTION INTRAMUSCULAR; INTRAVENOUS at 22:06

## 2022-01-07 NOTE — TELEPHONE ENCOUNTER
Call to [facility]  3500 due to both Coreg and Toprol Xl listed on After Visit Summary that was printed and went to SNF. Transferred to unit and spoke with patient's nurse Chris Suarez. She had Coreg listed and on hold for tonight. She will discontinue. She does have Toprol XL listed as a medication.

## 2022-01-07 NOTE — PROGRESS NOTES
.Transition of care outreach postponed for 14 days due to patient's discharge to SNF. MA contacted patient and scheduled for appointment at 2pm today.      Electronically signed by Zenaida Peralta on 7/22/21 at 11:25 AM EDT

## 2022-01-08 PROBLEM — I50.9 CHF (CONGESTIVE HEART FAILURE) (HCC): Status: ACTIVE | Noted: 2022-01-08

## 2022-01-08 LAB
ANION GAP SERPL CALC-SCNC: 7 MMOL/L (ref 5–15)
B PERT DNA SPEC QL NAA+PROBE: NOT DETECTED
BNP SERPL-MCNC: ABNORMAL PG/ML
BORDETELLA PARAPERTUSSIS PCR, BORPAR: NOT DETECTED
BUN SERPL-MCNC: 48 MG/DL (ref 6–20)
BUN/CREAT SERPL: 23 (ref 12–20)
C PNEUM DNA SPEC QL NAA+PROBE: NOT DETECTED
CALCIUM SERPL-MCNC: 9.4 MG/DL (ref 8.5–10.1)
CALCULATED R AXIS, ECG10: -8 DEGREES
CALCULATED T AXIS, ECG11: 136 DEGREES
CHLORIDE SERPL-SCNC: 103 MMOL/L (ref 97–108)
CO2 SERPL-SCNC: 25 MMOL/L (ref 21–32)
COMMENT, HOLDF: NORMAL
CREAT SERPL-MCNC: 2.12 MG/DL (ref 0.55–1.02)
DIAGNOSIS, 93000: NORMAL
FLUAV H1 2009 PAND RNA SPEC QL NAA+PROBE: NOT DETECTED
FLUAV H1 RNA SPEC QL NAA+PROBE: NOT DETECTED
FLUAV H3 RNA SPEC QL NAA+PROBE: NOT DETECTED
FLUAV SUBTYP SPEC NAA+PROBE: NOT DETECTED
FLUBV RNA SPEC QL NAA+PROBE: NOT DETECTED
GLUCOSE SERPL-MCNC: 119 MG/DL (ref 65–100)
HADV DNA SPEC QL NAA+PROBE: NOT DETECTED
HCOV 229E RNA SPEC QL NAA+PROBE: NOT DETECTED
HCOV HKU1 RNA SPEC QL NAA+PROBE: NOT DETECTED
HCOV NL63 RNA SPEC QL NAA+PROBE: NOT DETECTED
HCOV OC43 RNA SPEC QL NAA+PROBE: NOT DETECTED
HMPV RNA SPEC QL NAA+PROBE: NOT DETECTED
HPIV1 RNA SPEC QL NAA+PROBE: NOT DETECTED
HPIV2 RNA SPEC QL NAA+PROBE: NOT DETECTED
HPIV3 RNA SPEC QL NAA+PROBE: NOT DETECTED
HPIV4 RNA SPEC QL NAA+PROBE: NOT DETECTED
M PNEUMO DNA SPEC QL NAA+PROBE: NOT DETECTED
MAGNESIUM SERPL-MCNC: 2.4 MG/DL (ref 1.6–2.4)
POTASSIUM SERPL-SCNC: 4.4 MMOL/L (ref 3.5–5.1)
PROCALCITONIN SERPL-MCNC: 0.05 NG/ML
Q-T INTERVAL, ECG07: 372 MS
QRS DURATION, ECG06: 138 MS
QTC CALCULATION (BEZET), ECG08: 500 MS
RSV RNA SPEC QL NAA+PROBE: NOT DETECTED
RV+EV RNA SPEC QL NAA+PROBE: NOT DETECTED
SAMPLES BEING HELD,HOLD: NORMAL
SARS-COV-2 PCR, COVPCR: NOT DETECTED
SODIUM SERPL-SCNC: 135 MMOL/L (ref 136–145)
TROPONIN-HIGH SENSITIVITY: 63 NG/L (ref 0–51)
TROPONIN-HIGH SENSITIVITY: 64 NG/L (ref 0–51)
TROPONIN-HIGH SENSITIVITY: 85 NG/L (ref 0–51)
VENTRICULAR RATE, ECG03: 109 BPM

## 2022-01-08 PROCEDURE — 77030013038 HC MSK CPAP FISP -A

## 2022-01-08 PROCEDURE — 74011000250 HC RX REV CODE- 250: Performed by: STUDENT IN AN ORGANIZED HEALTH CARE EDUCATION/TRAINING PROGRAM

## 2022-01-08 PROCEDURE — 77010033711 HC HIGH FLOW OXYGEN

## 2022-01-08 PROCEDURE — 84145 PROCALCITONIN (PCT): CPT

## 2022-01-08 PROCEDURE — 65660000000 HC RM CCU STEPDOWN

## 2022-01-08 PROCEDURE — 84484 ASSAY OF TROPONIN QUANT: CPT

## 2022-01-08 PROCEDURE — 99222 1ST HOSP IP/OBS MODERATE 55: CPT | Performed by: SPECIALIST

## 2022-01-08 PROCEDURE — 0202U NFCT DS 22 TRGT SARS-COV-2: CPT

## 2022-01-08 PROCEDURE — 80048 BASIC METABOLIC PNL TOTAL CA: CPT

## 2022-01-08 PROCEDURE — 36415 COLL VENOUS BLD VENIPUNCTURE: CPT

## 2022-01-08 PROCEDURE — 74011250637 HC RX REV CODE- 250/637: Performed by: STUDENT IN AN ORGANIZED HEALTH CARE EDUCATION/TRAINING PROGRAM

## 2022-01-08 PROCEDURE — 77010033678 HC OXYGEN DAILY

## 2022-01-08 PROCEDURE — 94762 N-INVAS EAR/PLS OXIMTRY CONT: CPT

## 2022-01-08 PROCEDURE — 83880 ASSAY OF NATRIURETIC PEPTIDE: CPT

## 2022-01-08 PROCEDURE — 83735 ASSAY OF MAGNESIUM: CPT

## 2022-01-08 PROCEDURE — 94660 CPAP INITIATION&MGMT: CPT

## 2022-01-08 RX ORDER — CILOSTAZOL 50 MG/1
100 TABLET ORAL
Status: DISCONTINUED | OUTPATIENT
Start: 2022-01-08 | End: 2022-01-14 | Stop reason: HOSPADM

## 2022-01-08 RX ORDER — BUSPIRONE HYDROCHLORIDE 5 MG/1
5 TABLET ORAL 3 TIMES DAILY
Status: DISCONTINUED | OUTPATIENT
Start: 2022-01-08 | End: 2022-01-14 | Stop reason: HOSPADM

## 2022-01-08 RX ORDER — BUMETANIDE 0.25 MG/ML
1 INJECTION INTRAMUSCULAR; INTRAVENOUS ONCE
Status: COMPLETED | OUTPATIENT
Start: 2022-01-08 | End: 2022-01-08

## 2022-01-08 RX ORDER — ACETAMINOPHEN 325 MG/1
650 TABLET ORAL
Status: DISCONTINUED | OUTPATIENT
Start: 2022-01-08 | End: 2022-01-14 | Stop reason: HOSPADM

## 2022-01-08 RX ORDER — HYDRALAZINE HYDROCHLORIDE 50 MG/1
50 TABLET, FILM COATED ORAL 3 TIMES DAILY
Status: DISCONTINUED | OUTPATIENT
Start: 2022-01-08 | End: 2022-01-12

## 2022-01-08 RX ORDER — GABAPENTIN 100 MG/1
100 CAPSULE ORAL
Status: DISCONTINUED | OUTPATIENT
Start: 2022-01-08 | End: 2022-01-14 | Stop reason: HOSPADM

## 2022-01-08 RX ORDER — ATORVASTATIN CALCIUM 40 MG/1
40 TABLET, FILM COATED ORAL
Status: DISCONTINUED | OUTPATIENT
Start: 2022-01-08 | End: 2022-01-14 | Stop reason: HOSPADM

## 2022-01-08 RX ORDER — DULOXETIN HYDROCHLORIDE 60 MG/1
120 CAPSULE, DELAYED RELEASE ORAL DAILY
Status: DISCONTINUED | OUTPATIENT
Start: 2022-01-08 | End: 2022-01-14 | Stop reason: HOSPADM

## 2022-01-08 RX ORDER — METOPROLOL TARTRATE 25 MG/1
50 TABLET, FILM COATED ORAL EVERY 12 HOURS
Status: DISCONTINUED | OUTPATIENT
Start: 2022-01-08 | End: 2022-01-11

## 2022-01-08 RX ORDER — LANOLIN ALCOHOL/MO/W.PET/CERES
6 CREAM (GRAM) TOPICAL
Status: DISCONTINUED | OUTPATIENT
Start: 2022-01-08 | End: 2022-01-14 | Stop reason: HOSPADM

## 2022-01-08 RX ORDER — CLOPIDOGREL BISULFATE 75 MG/1
75 TABLET ORAL
Status: DISCONTINUED | OUTPATIENT
Start: 2022-01-08 | End: 2022-01-14 | Stop reason: HOSPADM

## 2022-01-08 RX ORDER — DILTIAZEM HYDROCHLORIDE 120 MG/1
120 CAPSULE, COATED, EXTENDED RELEASE ORAL DAILY
Status: DISCONTINUED | OUTPATIENT
Start: 2022-01-08 | End: 2022-01-11

## 2022-01-08 RX ORDER — SODIUM CHLORIDE 0.9 % (FLUSH) 0.9 %
5-40 SYRINGE (ML) INJECTION EVERY 8 HOURS
Status: DISCONTINUED | OUTPATIENT
Start: 2022-01-08 | End: 2022-01-14 | Stop reason: HOSPADM

## 2022-01-08 RX ORDER — ONDANSETRON 4 MG/1
4 TABLET, ORALLY DISINTEGRATING ORAL
Status: DISCONTINUED | OUTPATIENT
Start: 2022-01-08 | End: 2022-01-14 | Stop reason: HOSPADM

## 2022-01-08 RX ORDER — ARIPIPRAZOLE 10 MG/1
20 TABLET ORAL DAILY
Status: DISCONTINUED | OUTPATIENT
Start: 2022-01-08 | End: 2022-01-14 | Stop reason: HOSPADM

## 2022-01-08 RX ORDER — POLYETHYLENE GLYCOL 3350 17 G/17G
17 POWDER, FOR SOLUTION ORAL DAILY PRN
Status: DISCONTINUED | OUTPATIENT
Start: 2022-01-08 | End: 2022-01-14 | Stop reason: HOSPADM

## 2022-01-08 RX ORDER — ACETAMINOPHEN 650 MG/1
650 SUPPOSITORY RECTAL
Status: DISCONTINUED | OUTPATIENT
Start: 2022-01-08 | End: 2022-01-14 | Stop reason: HOSPADM

## 2022-01-08 RX ORDER — METOPROLOL SUCCINATE 50 MG/1
100 TABLET, EXTENDED RELEASE ORAL DAILY
Status: DISCONTINUED | OUTPATIENT
Start: 2022-01-08 | End: 2022-01-08

## 2022-01-08 RX ORDER — CARBIDOPA AND LEVODOPA 25; 100 MG/1; MG/1
2 TABLET ORAL 4 TIMES DAILY
Status: DISCONTINUED | OUTPATIENT
Start: 2022-01-08 | End: 2022-01-14 | Stop reason: HOSPADM

## 2022-01-08 RX ORDER — BUMETANIDE 0.25 MG/ML
1 INJECTION INTRAMUSCULAR; INTRAVENOUS EVERY 12 HOURS
Status: DISCONTINUED | OUTPATIENT
Start: 2022-01-08 | End: 2022-01-14 | Stop reason: HOSPADM

## 2022-01-08 RX ORDER — SODIUM CHLORIDE 0.9 % (FLUSH) 0.9 %
5-40 SYRINGE (ML) INJECTION AS NEEDED
Status: DISCONTINUED | OUTPATIENT
Start: 2022-01-08 | End: 2022-01-14 | Stop reason: HOSPADM

## 2022-01-08 RX ORDER — PANTOPRAZOLE SODIUM 40 MG/1
40 TABLET, DELAYED RELEASE ORAL
Status: DISCONTINUED | OUTPATIENT
Start: 2022-01-08 | End: 2022-01-14 | Stop reason: HOSPADM

## 2022-01-08 RX ORDER — ONDANSETRON 2 MG/ML
4 INJECTION INTRAMUSCULAR; INTRAVENOUS
Status: DISCONTINUED | OUTPATIENT
Start: 2022-01-08 | End: 2022-01-14 | Stop reason: HOSPADM

## 2022-01-08 RX ADMIN — ATORVASTATIN CALCIUM 40 MG: 40 TABLET, FILM COATED ORAL at 22:28

## 2022-01-08 RX ADMIN — ARIPIPRAZOLE 20 MG: 10 TABLET ORAL at 09:55

## 2022-01-08 RX ADMIN — APIXABAN 2.5 MG: 2.5 TABLET, FILM COATED ORAL at 09:54

## 2022-01-08 RX ADMIN — HYDRALAZINE HYDROCHLORIDE 50 MG: 50 TABLET, FILM COATED ORAL at 09:58

## 2022-01-08 RX ADMIN — METOPROLOL TARTRATE 50 MG: 50 TABLET, FILM COATED ORAL at 13:43

## 2022-01-08 RX ADMIN — Medication 5 ML: at 22:00

## 2022-01-08 RX ADMIN — GABAPENTIN 100 MG: 100 CAPSULE ORAL at 02:48

## 2022-01-08 RX ADMIN — BUMETANIDE 1 MG: 0.25 INJECTION INTRAMUSCULAR; INTRAVENOUS at 02:48

## 2022-01-08 RX ADMIN — CARBIDOPA AND LEVODOPA 2 TABLET: 25; 100 TABLET ORAL at 09:56

## 2022-01-08 RX ADMIN — CLOPIDOGREL 75 MG: 75 TABLET, FILM COATED ORAL at 09:57

## 2022-01-08 RX ADMIN — DULOXETINE HYDROCHLORIDE 120 MG: 60 CAPSULE, DELAYED RELEASE ORAL at 09:58

## 2022-01-08 RX ADMIN — CARBIDOPA AND LEVODOPA 2 TABLET: 25; 100 TABLET ORAL at 23:12

## 2022-01-08 RX ADMIN — ATORVASTATIN CALCIUM 40 MG: 40 TABLET, FILM COATED ORAL at 02:48

## 2022-01-08 RX ADMIN — GABAPENTIN 100 MG: 100 CAPSULE ORAL at 22:28

## 2022-01-08 RX ADMIN — Medication 6 MG: at 22:27

## 2022-01-08 RX ADMIN — BUSPIRONE HYDROCHLORIDE 5 MG: 5 TABLET ORAL at 23:12

## 2022-01-08 RX ADMIN — BUSPIRONE HYDROCHLORIDE 5 MG: 5 TABLET ORAL at 09:55

## 2022-01-08 RX ADMIN — CILOSTAZOL 100 MG: 50 TABLET ORAL at 09:56

## 2022-01-08 RX ADMIN — HYDRALAZINE HYDROCHLORIDE 50 MG: 50 TABLET, FILM COATED ORAL at 22:28

## 2022-01-08 RX ADMIN — METOPROLOL TARTRATE 50 MG: 50 TABLET, FILM COATED ORAL at 22:29

## 2022-01-08 RX ADMIN — BUMETANIDE 1 MG: 0.25 INJECTION INTRAMUSCULAR; INTRAVENOUS at 22:25

## 2022-01-08 RX ADMIN — PANTOPRAZOLE SODIUM 40 MG: 40 TABLET, DELAYED RELEASE ORAL at 10:01

## 2022-01-08 RX ADMIN — Medication 6 MG: at 02:21

## 2022-01-08 RX ADMIN — CARBIDOPA AND LEVODOPA 2 TABLET: 25; 100 TABLET ORAL at 18:40

## 2022-01-08 RX ADMIN — BUSPIRONE HYDROCHLORIDE 5 MG: 5 TABLET ORAL at 16:16

## 2022-01-08 RX ADMIN — APIXABAN 2.5 MG: 2.5 TABLET, FILM COATED ORAL at 18:39

## 2022-01-08 RX ADMIN — CARBIDOPA AND LEVODOPA 2 TABLET: 25; 100 TABLET ORAL at 13:44

## 2022-01-08 RX ADMIN — DILTIAZEM HYDROCHLORIDE 120 MG: 120 CAPSULE, COATED, EXTENDED RELEASE ORAL at 09:57

## 2022-01-08 RX ADMIN — BUMETANIDE 1 MG: 0.25 INJECTION INTRAMUSCULAR; INTRAVENOUS at 13:43

## 2022-01-08 RX ADMIN — CILOSTAZOL 100 MG: 50 TABLET ORAL at 16:16

## 2022-01-08 NOTE — CONSULTS
Sarina Hernández  DIVISION of Jocelynn Saez M.D., [facility]  [phone]        NAME: Eliot Newton   :  1940   MRN:  [id number]  Date/Time:  22:06 PM      ________________________________________________________________________  HPI    [de-identified]years old female with past medical history remarkable for hypertension, atrial fibrillation, coronary disease status post CABG. Status post PCI of native LAD in 2021. She does have severe three-vessel disease with a patent LIMA to the LAD and a patent graft sequential to the first diagonal obtuse marginal branch. Also history of diastolic dysfunction and fluctuating ejection fraction. Recently discharged from hospital following admission for shortness of breath she presents again with occurrence of shortness of breath. To be noted the patient was previously considered for resynchronization therapy but it was felt that ejection fraction had improved and left bundle branch block was not as wide as before. To be noted that she has undergone a renal angiogram in the past which demonstrated moderate renal arterial disease. Assessment/Plan  1. Shortness of breath: Probably in part related to diastolic dysfunction in my opinion we need to reevaluate ejection fraction once again given the fact that she has had this fluctuations in the past.  Proceed with limited echo. Continue with IV diuresis and keep I's and O checked. Chest x-ray and proBNP noted chest x-ray with pulmonary edema and proBNP elevated more so than from discharge. None    2. CAD: Troponin essentially not significant. Her EKG showed atrial fibrillation with left bundle branch block. We may have to reconsider the possibility of the stress test to determine if there is any area major ischemia that could lead to some LV dysfunction but for now the stress test will be on the back burner fear.     3.  Atrial fibrillation: Continue same dose of metoprolol and Cardizem the heart rate is slightly elevated but that is not unexpected given her mild respiratory distress. Continue Eliquis. 4.  CKD: Creatinine seems to be relatively stable defer to primary team.         ICD-10-CM ICD-9-CM    1. Acute on chronic respiratory failure with hypoxia (HCC)  J96.21 518.84      799.02    2. Acute on chronic congestive heart failure, unspecified heart failure type ([address]  I50.9 428.0           CARDIAC STUDIES      12/30/21    ECHO ADULT COMPLETE 12/31/2021 12/31/2021    Interpretation Summary    Left Ventricle: Left ventricle size is normal. Normal wall thickness. Normal wall motion. Normal left ventricular systolic function with a visually estimated EF of 55 - 60%.   Aortic Valve: Mild stenosis. Mild transvalvular regurgitation. Mean gradient of 14 mmhg.   Mitral Valve: Moderate mitral annular calcification. Mild to moderate transvalvular regurgitation.   Pulmonary Arteries: Mild pulmonary hypertension present. Estimated PA pressure of 45 mmHg.   Pericardium:  No pericardial effusion. Left pleural effusion. Signed by: Wallace Mehta MD on 12/31/2021 12:11 PM         04/30/20    NUCLEAR CARDIAC STRESS TEST 05/05/2020, 05/05/2020 5/5/2020    Interpretation Summary  · Baseline ECG: Normal sinus rhythm, non-specific ST-T wave abnormalities. · NM: No ischemia. Possible small distal anterior infarct. LVEF 53%. Rest and Lexiscan myocardial perfusion SPECT imaging is performed with IV injections of 10.6 and 32.9 mCi technetium 99m Sestamibi respectively. SPECT images displayed in three planes show no ischemic reversibility. There is a small focus of distal anterior wall fixed thinning, possible infarct. Gated SPECT images reviewed in 3 planes show unremarkable LV systolic wall thickening. There is no segmental wall motion abnormality of the left ventricular myocardium. The calculated LV ejection fraction is 53%. Pulmonary uptake and left ventricular cavity size appear normal.    Impression  : No ischemia. Possible small distal anterior infarct. LVEF 53%. Signed by: Melia Mcgovern MD on 5/5/2020 10:26 AM, Signed by: Khoa Ramírez MD on 5/5/2020 12:11 PM        03/29/21    INVASIVE VASCULAR PROCEDURE 04/05/2021 4/5/2021  CARDIAC PROCEDURE 04/05/2021 4/5/2021    Conclusion  Results: After angiography 60 to 70% stenosis of ostium of right renal artery was noted. On IVUS imaging the minimal luminal dimensions were 4 x 2.1 mm with MLA of 8 mm². Left renal angiogram demonstrated 50 to 60% angiographic stenosis which on IVUS appeared to be even less prominent with minimal luminal dimensions of 4.2 x 3.7 mm. Conclusion: Moderate to severe left renal artery stenosis and mild to moderate right renal arterial stenosis. Both stenotic lesions are ostial and atherosclerotic in etiology. Signed by: Scooter Gaytan MD on 4/5/2021 10:00 AM        EKG: atrial fibrillation, LBBB. IMAGING      CT Results (most recent):  Results from Hospital Encounter encounter on 10/29/21    CT ABD PELV WO CONT    Narrative  EXAM: CT ABD PELV WO CONT    INDICATION: abd pain and tenderness, fevers    COMPARISON: 4/23/2021    CONTRAST:  None. TECHNIQUE:  Thin axial images were obtained through the abdomen and pelvis. Coronal and  sagittal reformats were generated. Oral contrast was not administered. CT dose  reduction was achieved through use of a standardized protocol tailored for this  examination and automatic exposure control for dose modulation. The absence of intravenous contrast material reduces the sensitivity for  evaluation of the vasculature and solid organs. FINDINGS:  LOWER THORAX: No significant abnormality in the incidentally imaged lower chest.  LIVER: No mass. BILIARY TREE: Gallstones are noted. CBD is not dilated. SPLEEN: within normal limits. PANCREAS: No focal abnormality.   ADRENALS: Unremarkable. KIDNEYS/URETERS: Unchanged bilateral renal cysts, no follow-up required. No  renal or ureteral stone or evidence of hydronephrosis. STOMACH: Unremarkable. SMALL BOWEL: No dilatation or wall thickening. COLON: No dilatation or wall thickening. APPENDIX: Not visualized. PERITONEUM: No ascites or pneumoperitoneum. RETROPERITONEUM: Unchanged 4.3 cm infrarenal abdominal aortic aneurysm. REPRODUCTIVE ORGANS: There is no pelvic mass or lymphadenopathy. URINARY BLADDER: No mass or calculus. BONES: Degenerative changes are seen in the lumbar spine. ABDOMINAL WALL: No mass or hernia. ADDITIONAL COMMENTS: N/A    Impression  Cholelithiasis. No acute abnormality. XR Results (most recent):  Results from Hospital Encounter encounter on 01/07/22    XR CHEST PORT    Narrative  Indication: Respiratory failure    Comparison: 1/2/2022    Portable exam of the chest obtained at 2105 demonstrates normal heart size. The  patient is status post median sternotomy. Diffuse interstitial infiltrates are  noted. There are small bilateral pleural effusions. Chronic rotator cuff tear is  noted on the left. Impression  Small bilateral pleural effusions and diffuse bilateral interstitial  infiltrates most likely related to pulmonary edema         MRI Results (most recent):                  Subjective:   CHIEF COMPLAINT:  \"sob\"  Pertinent items are noted in HPI. HISTORY OF PRESENT ILLNESS:     Chelly Ortega is a [de-identified] y.o. female whom presents with complaint noted above. We were asked to evaluation for the above problems.    Mitch Zuniga  Past Medical History:   Diagnosis Date    Anxiety disorder     Atrial fibrillation (HCC)     CAD (coronary artery disease) 2007    stents, CABG x 3v    Carotid stenosis     Cervical stenosis of spinal canal 07/2019    Chronic kidney disease     Cough     CVA [facility]cerebral vascular accident) [address] [address] 07/2019    left lacunar infarct at head of caudate    Depression     AND CHRONIC ANXIETY    Diabetes [facility] [address]     GERD (gastroesophageal reflux disease)     High cholesterol     History of peptic ulcer     Bleeding ulcer with increased NSAID use    Hypertension     Left carotid stenosis 2019    s/p left CEA with Dr. Virgen Estevez, old 2007    PUD (peptic ulcer disease)     Stroke ([facility] [address]     Tremor     Valvular heart disease       Past Surgical History:   Procedure Laterality Date    COLONOSCOPY N/A 2021    COLONOSCOPY   :- performed by [facility] ENDOSCOPY    HX CAROTID ENDARTERECTOMY  2019    HX CORONARY ARTERY BYPASS GRAFT      HX TONSILLECTOMY  1963    OR CARDIAC SURG PROCEDURE UNLIST      CABG X3 VESSEL    OR TOTAL KNEE ARTHROPLASTY Right 2015     Social History     Tobacco Use    Smoking status: Former Smoker     Packs/day: 0.25     Years: 5.00     Pack years: 1.25     Types: Cigarettes     Quit date:      Years since quittin.0    Smokeless tobacco: Never Used   Substance Use Topics    Alcohol use: Not Currently     Comment: Rare      Family History   Problem Relation Age of Onset    Heart Attack Mother 72        Dec 89yo    Hypertension Mother     Other Father         Unknown    Parkinson's Disease Brother     Anesth Problems Neg Hx       No Known Allergies        Current Facility-Administered Medications   Medication Dose Route Frequency    apixaban (ELIQUIS) tablet 2.5 mg  2.5 mg Oral BID    ARIPiprazole (ABILIFY) tablet 20 mg  20 mg Oral DAILY    atorvastatin (LIPITOR) tablet 40 mg  40 mg Oral QHS    busPIRone (BUSPAR) tablet 5 mg  5 mg Oral TID    carbidopa-levodopa (SINEMET)  mg per tablet 2 Tablet  2 Tablet Oral QID    cilostazoL (PLETAL) tablet 100 mg  100 mg Oral ACB&D    clopidogreL (PLAVIX) tablet 75 mg  75 mg Oral DAILY AFTER BREAKFAST    dilTIAZem ER (CARDIZEM CD) capsule 120 mg  120 mg Oral DAILY    DULoxetine (CYMBALTA) capsule 120 mg  120 mg Oral DAILY    gabapentin (NEURONTIN) capsule 100 mg  100 mg Oral QHS    hydrALAZINE (APRESOLINE) tablet 50 mg  50 mg Oral TID    melatonin tablet 6 mg  6 mg Oral QHS    pantoprazole (PROTONIX) tablet 40 mg  40 mg Oral ACB    sodium chloride (NS) flush 5-40 mL  5-40 mL IntraVENous Q8H    sodium chloride (NS) flush 5-40 mL  5-40 mL IntraVENous PRN    acetaminophen (TYLENOL) tablet 650 mg  650 mg Oral Q6H PRN    Or    acetaminophen (TYLENOL) suppository 650 mg  650 mg Rectal Q6H PRN    polyethylene glycol (MIRALAX) packet 17 g  17 g Oral DAILY PRN    ondansetron (ZOFRAN ODT) tablet 4 mg  4 mg Oral Q8H PRN    Or    ondansetron (ZOFRAN) injection 4 mg  4 mg IntraVENous Q6H PRN    metoprolol tartrate (LOPRESSOR) tablet 50 mg  50 mg Oral Q12H    bumetanide (BUMEX) injection 1 mg  1 mg IntraVENous Q12H     Current Outpatient Medications   Medication Sig    ARIPiprazole (ABILIFY) 20 mg tablet Take 1 Tablet by mouth daily.  busPIRone (BUSPAR) 5 mg tablet Take 1 Tablet by mouth three (3) times daily.  DULoxetine (CYMBALTA) 60 mg capsule Take 2 Capsules by mouth daily. Indications: anxiousness associated with depression    senna-docusate (Senna with Docusate Sodium) 8.6-50 mg per tablet Take 1 Tablet by mouth nightly.  metoprolol succinate (TOPROL-XL) 100 mg tablet Take 1 Tablet by mouth daily.  bumetanide (BUMEX) 1 mg tablet Take 1 Tablet by mouth daily.  dilTIAZem ER (DILACOR XR) 120 mg capsule Take 120 mg by mouth daily. Ordered this AM at [facility] but had not yet started.  polyethylene glycol (MIRALAX) 17 gram packet Take 1 Packet by mouth daily as needed for Constipation for up to 30 days.  ondansetron (ZOFRAN ODT) 4 mg disintegrating tablet Take 1 Tablet by mouth every eight (8) hours as needed for Nausea or Vomiting.  cilostazoL (PLETAL) 100 mg tablet Take 100 mg by mouth Before breakfast and dinner.  apixaban (ELIQUIS) 2.5 mg tablet Take 1 Tablet by mouth two (2) times a day.     hydrALAZINE (APRESOLINE) 50 mg tablet Take 1 Tablet by mouth three (3) times daily.  atorvastatin (LIPITOR) 40 mg tablet Take 1 Tablet by mouth nightly.  gabapentin (NEURONTIN) 100 mg capsule Take 100 mg by mouth nightly.  melatonin 3 mg tablet Take 6 mg by mouth nightly.  nitroglycerin (Nitrostat) 0.4 mg SL tablet 0.4 mg by SubLINGual route every five (5) minutes as needed for Chest Pain. Up to 3 doses.  clopidogreL (Plavix) 75 mg tab Take 75 mg by mouth daily (after breakfast).  carbidopa-levodopa (SINEMET)  mg per tablet Take 2 Tabs by mouth four (4) times daily.  acetaminophen (TYLENOL) 325 mg tablet Take 650 mg by mouth every six (6) hours as needed for Pain or Fever.  cyanocobalamin (VITAMIN B12) 100 mcg tablet Take 100 mcg by mouth daily.  Cholecalciferol, Vitamin D3, 1,000 unit cap Take 1,000 Units by mouth daily.  pantoprazole (PROTONIX) 40 mg tablet Take 40 mg by mouth Daily (before breakfast). Indications: h/o bleeding ulcer with nsaid    multivitamins-minerals-lutein (CENTRUM SILVER) tab tablet Take 1 Tablet by mouth daily. Prior to Admission medications    Medication Sig Start Date End Date Taking? Authorizing Provider   ARIPiprazole (ABILIFY) 20 mg tablet Take 1 Tablet by mouth daily. 1/7/22   Jacquelyn Morales MD   busPIRone (BUSPAR) 5 mg tablet Take 1 Tablet by mouth three (3) times daily. 1/7/22   Jacquelyn Morales MD   DULoxetine (CYMBALTA) 60 mg capsule Take 2 Capsules by mouth daily. Indications: anxiousness associated with depression 1/7/22   Jacquelyn Morales MD   senna-docusate (Senna with Docusate Sodium) 8.6-50 mg per tablet Take 1 Tablet by mouth nightly. 1/6/22   Jacquelyn Morales MD   metoprolol succinate (TOPROL-XL) 100 mg tablet Take 1 Tablet by mouth daily. 1/6/22   Jacquelyn Morales MD   bumetanide (BUMEX) 1 mg tablet Take 1 Tablet by mouth daily. 1/7/22   Jacquelyn Morales MD   dilTIAZem ER (DILACOR XR) 120 mg capsule Take 120 mg by mouth daily. Ordered this AM at [facility] but had not yet started.     Provider, Historical   polyethylene glycol (MIRALAX) 17 gram packet Take 1 Packet by mouth daily as needed for Constipation for up to 30 days. 12/21/21 1/20/22  Pillo Summers MD   ondansetron (ZOFRAN ODT) 4 mg disintegrating tablet Take 1 Tablet by mouth every eight (8) hours as needed for Nausea or Vomiting. 12/21/21   Pillo Summers MD   cilostazoL (PLETAL) 100 mg tablet Take 100 mg by mouth Before breakfast and dinner. Provider, Historical   apixaban (ELIQUIS) 2.5 mg tablet Take 1 Tablet by mouth two (2) times a day. 8/26/21   Daniela Means MD   hydrALAZINE (APRESOLINE) 50 mg tablet Take 1 Tablet by mouth three (3) times daily. 7/16/21   Daniela Means MD   atorvastatin (LIPITOR) 40 mg tablet Take 1 Tablet by mouth nightly. 7/16/21   Daniela Means MD   gabapentin (NEURONTIN) 100 mg capsule Take 100 mg by mouth nightly. Provider, Historical   melatonin 3 mg tablet Take 6 mg by mouth nightly. Provider, Historical   nitroglycerin (Nitrostat) 0.4 mg SL tablet 0.4 mg by SubLINGual route every five (5) minutes as needed for Chest Pain. Up to 3 doses. Provider, Historical   clopidogreL (Plavix) 75 mg tab Take 75 mg by mouth daily (after breakfast). Provider, Historical   carbidopa-levodopa (SINEMET)  mg per tablet Take 2 Tabs by mouth four (4) times daily. 2/4/20   Dom Santos MD   acetaminophen (TYLENOL) 325 mg tablet Take 650 mg by mouth every six (6) hours as needed for Pain or Fever. Provider, Historical   cyanocobalamin (VITAMIN B12) 100 mcg tablet Take 100 mcg by mouth daily. Provider, Historical   Cholecalciferol, Vitamin D3, 1,000 unit cap Take 1,000 Units by mouth daily. Provider, Historical   pantoprazole (PROTONIX) 40 mg tablet Take 40 mg by mouth Daily (before breakfast). Indications: h/o bleeding ulcer with nsaid    Provider, Historical   multivitamins-minerals-lutein (CENTRUM SILVER) tab tablet Take 1 Tablet by mouth daily.     Provider, Historical Objective:   VITALS:      Patient Vitals for the past 12 hrs:   Temp Pulse Resp BP SpO2   01/08/22 1348 -- 99 23 126/78 95 %   01/08/22 1343 -- (!) 110 -- (!) 145/82 --   01/08/22 1315 -- (!) 113 21 116/84 99 %   01/08/22 1302 -- -- -- -- 96 %   01/08/22 1041 -- -- -- -- 92 %   01/08/22 1006 -- -- -- -- 93 %   01/08/22 0957 98.5 °F (36.9 °C) (!) 101 23 (!) 130/96 95 %   01/08/22 0745 -- (!) 111 21 (!) 148/89 98 %   01/08/22 0545 -- (!) 112 20 132/88 96 %   01/08/22 0515 -- (!) 105 21 (!) 138/90 94 %   01/08/22 0430 -- (!) 118 20 (!) 135/108 97 %   01/08/22 0300 -- (!) 113 21 (!) 139/91 96 %        Visit Vitals  /78   Pulse 99   Temp 98.5 °F (36.9 °C)   Resp 23   SpO2 95%         Extended / Orthostatic Vitals: Wt Readings from Last 3 Encounters:   01/06/22 164 lb 3.9 oz (74.5 kg)   12/18/21 173 lb 8 oz (78.7 kg)   11/03/21 182 lb 8.7 oz (82.8 kg)       No intake or output data in the 24 hours ending 01/08/22 1406       Neck: no JVD  Heart: irregularly irregular rhythm  Lungs: diminished breath sounds R anterior, L anterior  Abdomen: soft, non-tender.  Bowel sounds normal. No masses,  no organomegaly  Extremities: no edema          LAB DATA REVIEWED:      All Cardiac Markers in the last 24 hours:    Lab Results   Component Value Date/Time    BNPNT 20,345 (H) 01/08/2022 02:41 AM         Recent Results (from the past 24 hour(s))   CBC WITH AUTOMATED DIFF    Collection Time: 01/07/22  8:31 PM   Result Value Ref Range    WBC 9.0 3.6 - 11.0 K/uL    RBC 2.91 (L) 3.80 - 5.20 M/uL    HGB 9.5 (L) 11.5 - 16.0 g/dL    HCT 29.8 (L) 35.0 - 47.0 %    .4 (H) 80.0 - 99.0 FL    MCH 32.6 26.0 - 34.0 PG    MCHC 31.9 30.0 - 36.5 g/dL    RDW 13.7 11.5 - 14.5 %    PLATELET 917 455 - 005 K/uL    MPV 8.8 (L) 8.9 - 12.9 FL    NRBC 0.0 0  WBC    ABSOLUTE NRBC 0.00 0.00 - 0.01 K/uL    NEUTROPHILS 87 (H) 32 - 75 %    LYMPHOCYTES 5 (L) 12 - 49 %    MONOCYTES 6 5 - 13 %    EOSINOPHILS 1 0 - 7 %    BASOPHILS 0 0 - 1 % IMMATURE GRANULOCYTES 1 (H) 0.0 - 0.5 %    ABS. NEUTROPHILS 7.8 1.8 - 8.0 K/UL    ABS. LYMPHOCYTES 0.5 (L) 0.8 - 3.5 K/UL    ABS. MONOCYTES 0.5 0.0 - 1.0 K/UL    ABS. EOSINOPHILS 0.1 0.0 - 0.4 K/UL    ABS. BASOPHILS 0.0 0.0 - 0.1 K/UL    ABS. IMM. GRANS. 0.1 (H) 0.00 - 0.04 K/UL    DF SMEAR SCANNED      RBC COMMENTS MACROCYTOSIS  1+       METABOLIC PANEL, COMPREHENSIVE    Collection Time: 01/07/22  8:31 PM   Result Value Ref Range    Sodium 134 (L) 136 - 145 mmol/L    Potassium 4.9 3.5 - 5.1 mmol/L    Chloride 102 97 - 108 mmol/L    CO2 27 21 - 32 mmol/L    Anion gap 5 5 - 15 mmol/L    Glucose 229 (H) 65 - 100 mg/dL    BUN 45 (H) 6 - 20 MG/DL    Creatinine 2.15 (H) 0.55 - 1.02 MG/DL    BUN/Creatinine ratio 21 (H) 12 - 20      GFR est AA 27 (L) >60 ml/min/1.73m2    GFR est non-AA 22 (L) >60 ml/min/1.73m2    Calcium 9.0 8.5 - 10.1 MG/DL    Bilirubin, total 0.5 0.2 - 1.0 MG/DL    ALT (SGPT) 15 12 - 78 U/L    AST (SGOT) 14 (L) 15 - 37 U/L    Alk. phosphatase 101 45 - 117 U/L    Protein, total 6.8 6.4 - 8.2 g/dL    Albumin 3.6 3.5 - 5.0 g/dL    Globulin 3.2 2.0 - 4.0 g/dL    A-G Ratio 1.1 1.1 - 2.2     SAMPLES BEING HELD    Collection Time: 01/07/22  8:31 PM   Result Value Ref Range    SAMPLES BEING HELD 1red, 1blu     COMMENT        Add-on orders for these samples will be processed based on acceptable specimen integrity and analyte stability, which may vary by analyte.    LACTIC ACID    Collection Time: 01/07/22  8:37 PM   Result Value Ref Range    Lactic acid 1.6 0.4 - 2.0 MMOL/L   COVID-19 RAPID TEST    Collection Time: 01/07/22  8:37 PM   Result Value Ref Range    Specimen source Nasopharyngeal      COVID-19 rapid test Not detected NOTD     CULTURE, BLOOD, PAIRED    Collection Time: 01/07/22  8:37 PM    Specimen: Blood   Result Value Ref Range    Special Requests: NO SPECIAL REQUESTS      Culture result: NO GROWTH AFTER 7 HOURS     EKG, 12 LEAD, INITIAL    Collection Time: 01/07/22  8:56 PM   Result Value Ref Range Ventricular Rate 109 BPM    QRS Duration 138 ms    Q-T Interval 372 ms    QTC Calculation (Bezet) 500 ms    Calculated R Axis -8 degrees    Calculated T Axis 136 degrees    Diagnosis       Atrial fibrillation with rapid ventricular response  Left bundle branch block  Abnormal ECG  When compared with ECG of 03-JAN-2022 10:53,  QRS axis shifted left  ST elevation now present in Anterior leads  T wave inversion no longer evident in Inferior leads  T wave inversion less evident in Anterolateral leads     POC G3 - PUL    Collection Time: 01/07/22 10:42 PM   Result Value Ref Range    FIO2 (POC) 50 %    pH (POC) 7.40 7.35 - 7.45      pCO2 (POC) 41.3 35.0 - 45.0 MMHG    pO2 (POC) 97 80 - 100 MMHG    HCO3 (POC) 25.4 22 - 26 MMOL/L    sO2 (POC) 97.5 (H) 92 - 97 %    Base excess (POC) 0.5 mmol/L    Site RIGHT RADIAL      Device: BIPAP MASK      PEEP/CPAP (POC) 8 cmH2O    PIP (POC) 12      Specimen type (POC) ARTERIAL     TROPONIN-HIGH SENSITIVITY    Collection Time: 01/08/22  2:41 AM   Result Value Ref Range    Troponin-High Sensitivity 85 (HH) 0 - 51 ng/L   NT-PRO BNP    Collection Time: 01/08/22  2:41 AM   Result Value Ref Range    NT pro-BNP 20,345 (H) <450 PG/ML   MAGNESIUM    Collection Time: 01/08/22  2:41 AM   Result Value Ref Range    Magnesium 2.4 1.6 - 2.4 mg/dL   PROCALCITONIN    Collection Time: 01/08/22  2:41 AM   Result Value Ref Range    Procalcitonin 5.14 ng/mL   METABOLIC PANEL, BASIC    Collection Time: 01/08/22  2:41 AM   Result Value Ref Range    Sodium 135 (L) 136 - 145 mmol/L    Potassium 4.4 3.5 - 5.1 mmol/L    Chloride 103 97 - 108 mmol/L    CO2 25 21 - 32 mmol/L    Anion gap 7 5 - 15 mmol/L    Glucose 119 (H) 65 - 100 mg/dL    BUN 48 (H) 6 - 20 MG/DL    Creatinine 2.12 (H) 0.55 - 1.02 MG/DL    BUN/Creatinine ratio 23 (H) 12 - 20      GFR est AA 27 (L) >60 ml/min/1.73m2    GFR est non-AA 22 (L) >60 ml/min/1.73m2    Calcium 9.4 8.5 - 10.1 MG/DL   SAMPLES BEING HELD    Collection Time: 01/08/22  2:41 AM Result Value Ref Range    SAMPLES BEING HELD 1LAV     COMMENT        Add-on orders for these samples will be processed based on acceptable specimen integrity and analyte stability, which may vary by analyte. RESPIRATORY VIRUS PANEL W/COVID-19, PCR    Collection Time: 01/08/22  5:10 AM    Specimen: Nasopharyngeal   Result Value Ref Range    Adenovirus Not detected NOTD      Coronavirus 229E Not detected NOTD      Coronavirus HKU1 Not detected NOTD      Coronavirus CVNL63 Not detected NOTD      Coronavirus OC43 Not detected NOTD      SARS-CoV-2, PCR Not detected NOTD      Metapneumovirus Not detected NOTD      Rhinovirus and Enterovirus Not detected NOTD      Influenza A Not detected NOTD      Influenza A, subtype H1 Not detected NOTD      Influenza A, subtype H3 Not detected NOTD      INFLUENZA A H1N1 PCR Not detected NOTD      Influenza B Not detected NOTD      Parainfluenza 1 Not detected NOTD      Parainfluenza 2 Not detected NOTD      Parainfluenza 3 Not detected NOTD      Parainfluenza virus 4 Not detected NOTD      RSV by PCR Not detected NOTD      B. parapertussis, PCR Not detected NOTD      Bordetella pertussis - PCR Not detected NOTD      Chlamydophila pneumoniae DNA, QL, PCR Not detected NOTD      Mycoplasma pneumoniae DNA, QL, PCR Not detected NOTD         Lab Results   Component Value Date/Time    Cholesterol, total 148 09/23/2020 04:27 AM    HDL Cholesterol 52 09/23/2020 04:27 AM    LDL, calculated 83.8 09/23/2020 04:27 AM    VLDL, calculated 12.2 09/23/2020 04:27 AM    Triglyceride 61 09/23/2020 04:27 AM    CHOL/HDL Ratio 2.8 09/23/2020 04:27 AM             Procedures: see electronic medical records for all procedures/Xrays and details which were not copied into this note but were reviewed prior to creation of Plan. Please note that this dictation was completed with [employer], the [employer] voice recognition software.   Quite often unanticipated grammatical, syntax, homophones, and other interpretive errors are inadvertently transcribed by the computer software. Please disregard these errors. Please excuse any errors that have escaped final proofreading.         Care Plan discussed with:  Patient    Family    RN    Care Manager    Consultant/Specialist:     ________________________    ______________________________________________      Signed By: Katherine Rubinstein, MD     January 8, 2022

## 2022-01-08 NOTE — H&P
History & Physical    Primary Care Provider: Terrance Colón DO  Source of Information: Patient and chart review    History of Presenting Illness:   Divya Rosas is a [de-identified] y.o. female with hx of afib on eliquis, htn, ckd iv, cad s/p cabg, cva, mdd w/ jas, gerd, parkinson who presents to hospital with complaints of sob. Patient was recently just discharged from hospital after admission for acute and chronic respiratory failure with hypoxia deemed secondary to CHF. Patient was discharged on 2 L of oxygen. She is seen and examined at bedside. States she has felt persistently short of breath over the last 24 hours. She denies any fever, chills, chest or abdominal pain, nausea, vomiting, cough, congestion, recent illness, palpitations, or dysuria. Per chart review, patient had been out of her baseline 2 L oxygen for an unknown amount of time. She was found hypoxic in low 80s and placed on 4 L nasal cannula. Patient was persistently hypoxic and tachypneic despite this and was therefore transferred to hospital for additional evaluation. Remarkable vitals on ER Presentations: hr 129, rr-33  Labs Remarkable for: hgb 9.5, glu-229, cr 2.15,   ER Images: cxr: Small bilateral pleural effusions and diffuse bilateral interstitial  infiltrates most likely related to pulmonary edema. Review of Systems:  A comprehensive review of systems was negative except for that written in the History of Present Illness.      Past Medical History:   Diagnosis Date    Anxiety disorder     Atrial fibrillation (HCC)     CAD (coronary artery disease) 2007    stents, CABG x 3v    Carotid stenosis     Cervical stenosis of spinal canal 07/2019    Chronic kidney disease     Cough     CVA (cerebral vascular accident) ([facility] [address] 07/2019    left lacunar infarct at head of caudate    Depression     AND CHRONIC ANXIETY    Diabetes (HCC)     GERD (gastroesophageal reflux disease)     High cholesterol  History of peptic ulcer     Bleeding ulcer with increased NSAID use    Hypertension     Left carotid stenosis 07/2019    s/p left CEA with Dr. Bright Castro, old 2007    PUD (peptic ulcer disease)     Stroke ([facility] [address]     Tremor     Valvular heart disease       Past Surgical History:   Procedure Laterality Date    COLONOSCOPY N/A 12/17/2021    COLONOSCOPY   :- performed by Bebo Madrid MD at [address] HX CAROTID ENDARTERECTOMY  07/2019    HX CORONARY ARTERY BYPASS GRAFT      HX TONSILLECTOMY  1963    PA CARDIAC SURG PROCEDURE UNLIST      CABG X3 VESSEL    PA TOTAL KNEE ARTHROPLASTY Right 2015     Prior to Admission medications    Medication Sig Start Date End Date Taking? Authorizing Provider   ARIPiprazole (ABILIFY) 20 mg tablet Take 1 Tablet by mouth daily. 1/7/22   Abbie Kramer MD   busPIRone (BUSPAR) 5 mg tablet Take 1 Tablet by mouth three (3) times daily. 1/7/22   Abbie Kramer MD   DULoxetine (CYMBALTA) 60 mg capsule Take 2 Capsules by mouth daily. Indications: anxiousness associated with depression 1/7/22   Abbie Kramer MD   senna-docusate (Senna with Docusate Sodium) 8.6-50 mg per tablet Take 1 Tablet by mouth nightly. 1/6/22   Abbie Kramer MD   metoprolol succinate (TOPROL-XL) 100 mg tablet Take 1 Tablet by mouth daily. 1/6/22   Abbie Kramer MD   bumetanide (BUMEX) 1 mg tablet Take 1 Tablet by mouth daily. 1/7/22   Abbie Kramer MD   dilTIAZem ER (DILACOR XR) 120 mg capsule Take 120 mg by mouth daily. Ordered this AM at [facility] but had not yet started. Provider, Historical   polyethylene glycol (MIRALAX) 17 gram packet Take 1 Packet by mouth daily as needed for Constipation for up to 30 days.  12/21/21 1/20/22  Guillaume Saez MD   ondansetron (ZOFRAN ODT) 4 mg disintegrating tablet Take 1 Tablet by mouth every eight (8) hours as needed for Nausea or Vomiting. 12/21/21   Guillaume Saez MD   cilostazoL (PLETAL) 100 mg tablet Take 100 mg by mouth Before breakfast and dinner. Provider, Historical   apixaban (ELIQUIS) 2.5 mg tablet Take 1 Tablet by mouth two (2) times a day. 8/26/21   Jackie Perry MD   hydrALAZINE (APRESOLINE) 50 mg tablet Take 1 Tablet by mouth three (3) times daily. 7/16/21   Jackie Perry MD   atorvastatin (LIPITOR) 40 mg tablet Take 1 Tablet by mouth nightly. 7/16/21   Jackie Perry MD   gabapentin (NEURONTIN) 100 mg capsule Take 100 mg by mouth nightly. Provider, Historical   melatonin 3 mg tablet Take 6 mg by mouth nightly. Provider, Historical   nitroglycerin (Nitrostat) 0.4 mg SL tablet 0.4 mg by SubLINGual route every five (5) minutes as needed for Chest Pain. Up to 3 doses. Provider, Historical   clopidogreL (Plavix) 75 mg tab Take 75 mg by mouth daily (after breakfast). Provider, Historical   carbidopa-levodopa (SINEMET)  mg per tablet Take 2 Tabs by mouth four (4) times daily. 2/4/20   Jeanmarie Perry MD   acetaminophen (TYLENOL) 325 mg tablet Take 650 mg by mouth every six (6) hours as needed for Pain or Fever. Provider, Historical   cyanocobalamin (VITAMIN B12) 100 mcg tablet Take 100 mcg by mouth daily. Provider, Historical   Cholecalciferol, Vitamin D3, 1,000 unit cap Take 1,000 Units by mouth daily. Provider, Historical   pantoprazole (PROTONIX) 40 mg tablet Take 40 mg by mouth Daily (before breakfast). Indications: h/o bleeding ulcer with nsaid    Provider, Historical   multivitamins-minerals-lutein (CENTRUM SILVER) tab tablet Take 1 Tablet by mouth daily.     Provider, Historical     No Known Allergies   Family History   Problem Relation Age of Onset    Heart Attack Mother 72        Dec 81yo    Hypertension Mother     Other Father         Unknown    Parkinson's Disease Brother     Anesth Problems Neg Hx         SOCIAL HISTORY:  Patient resides:  Independently    Assisted Living    SNF x   With family care       Smoking history:   None x   Former    Chronic      Alcohol history:   None x   Social    Chronic      Ambulates:   Independently x   w/cane    w/walker    w/wc    CODE STATUS:  DNR    Full x   Other      Objective:     Physical Exam:     Visit Vitals  BP (!) 127/95   Pulse (!) 110   Temp 99.5 °F (37.5 °C)   Resp 19   SpO2 96%    O2 Flow Rate (L/min): 15 l/min O2 Device: BIPAP    General:  Alert, cooperative, no distress, appears stated age. Head:  Normocephalic, without obvious abnormality, atraumatic. Eyes:  Conjunctivae/corneas clear. PERRL, EOMs intact. Nose: Nares normal. Septum midline. Mucosa normal.        Neck: Supple, symmetrical, trachea midline, no carotid bruit and no JVD. Lungs:    Coarse breath sounds with bibasilar crackles   Chest wall:  No tenderness or deformity. Heart:  Regular irregular   Abdomen:   Soft, non-tender. Bowel sounds normal. No masses,  No organomegaly. Extremities: Extremities normal, atraumatic, no cyanosis or edema. Pulses: 2+ and symmetric all extremities. Skin: Skin color, texture, turgor normal. No rashes or lesions   Neurologic: CNII-XII intact. EKG: Atrial fibrillation  Data Review:     Recent Days:  Recent Labs     01/07/22 2031   WBC 9.0   HGB 9.5*   HCT 29.8*        Recent Labs     01/07/22 2031 01/06/22  0511   * 135*   K 4.9 4.4    102   CO2 27 26   * 104*   BUN 45* 48*   CREA 2.15* 2.02*   CA 9.0 9.2   PHOS  --  4.6   ALB 3.6 3.2*   ALT 15  --      No results for input(s): PH, PCO2, PO2, HCO3, FIO2 in the last 72 hours.     24 Hour Results:  Recent Results (from the past 24 hour(s))   CBC WITH AUTOMATED DIFF    Collection Time: 01/07/22  8:31 PM   Result Value Ref Range    WBC 9.0 3.6 - 11.0 K/uL    RBC 2.91 (L) 3.80 - 5.20 M/uL    HGB 9.5 (L) 11.5 - 16.0 g/dL    HCT 29.8 (L) 35.0 - 47.0 %    .4 (H) 80.0 - 99.0 FL    MCH 32.6 26.0 - 34.0 PG    MCHC 31.9 30.0 - 36.5 g/dL    RDW 13.7 11.5 - 14.5 %    PLATELET 001 364 - 905 K/uL    MPV 8.8 (L) 8.9 - 12.9 FL    NRBC 0.0 0  WBC ABSOLUTE NRBC 0.00 0.00 - 0.01 K/uL    NEUTROPHILS 87 (H) 32 - 75 %    LYMPHOCYTES 5 (L) 12 - 49 %    MONOCYTES 6 5 - 13 %    EOSINOPHILS 1 0 - 7 %    BASOPHILS 0 0 - 1 %    IMMATURE GRANULOCYTES 1 (H) 0.0 - 0.5 %    ABS. NEUTROPHILS 7.8 1.8 - 8.0 K/UL    ABS. LYMPHOCYTES 0.5 (L) 0.8 - 3.5 K/UL    ABS. MONOCYTES 0.5 0.0 - 1.0 K/UL    ABS. EOSINOPHILS 0.1 0.0 - 0.4 K/UL    ABS. BASOPHILS 0.0 0.0 - 0.1 K/UL    ABS. IMM. GRANS. 0.1 (H) 0.00 - 0.04 K/UL    DF SMEAR SCANNED      RBC COMMENTS MACROCYTOSIS  1+       METABOLIC PANEL, COMPREHENSIVE    Collection Time: 01/07/22  8:31 PM   Result Value Ref Range    Sodium 134 (L) 136 - 145 mmol/L    Potassium 4.9 3.5 - 5.1 mmol/L    Chloride 102 97 - 108 mmol/L    CO2 27 21 - 32 mmol/L    Anion gap 5 5 - 15 mmol/L    Glucose 229 (H) 65 - 100 mg/dL    BUN 45 (H) 6 - 20 MG/DL    Creatinine 2.15 (H) 0.55 - 1.02 MG/DL    BUN/Creatinine ratio 21 (H) 12 - 20      GFR est AA 27 (L) >60 ml/min/1.73m2    GFR est non-AA 22 (L) >60 ml/min/1.73m2    Calcium 9.0 8.5 - 10.1 MG/DL    Bilirubin, total 0.5 0.2 - 1.0 MG/DL    ALT (SGPT) 15 12 - 78 U/L    AST (SGOT) 14 (L) 15 - 37 U/L    Alk. phosphatase 101 45 - 117 U/L    Protein, total 6.8 6.4 - 8.2 g/dL    Albumin 3.6 3.5 - 5.0 g/dL    Globulin 3.2 2.0 - 4.0 g/dL    A-G Ratio 1.1 1.1 - 2.2     SAMPLES BEING HELD    Collection Time: 01/07/22  8:31 PM   Result Value Ref Range    SAMPLES BEING HELD 1red, 1blu     COMMENT        Add-on orders for these samples will be processed based on acceptable specimen integrity and analyte stability, which may vary by analyte.    LACTIC ACID    Collection Time: 01/07/22  8:37 PM   Result Value Ref Range    Lactic acid 1.6 0.4 - 2.0 MMOL/L   COVID-19 RAPID TEST    Collection Time: 01/07/22  8:37 PM   Result Value Ref Range    Specimen source Nasopharyngeal      COVID-19 rapid test Not detected NOTD     EKG, 12 LEAD, INITIAL    Collection Time: 01/07/22  8:56 PM   Result Value Ref Range    Ventricular Rate 109 BPM    QRS Duration 138 ms    Q-T Interval 372 ms    QTC Calculation (Bezet) 500 ms    Calculated R Axis -8 degrees    Calculated T Axis 136 degrees    Diagnosis       Atrial fibrillation with rapid ventricular response  Left bundle branch block  Abnormal ECG  When compared with ECG of 03-JAN-2022 10:53,  QRS axis shifted left  ST elevation now present in Anterior leads  T wave inversion no longer evident in Inferior leads  T wave inversion less evident in Anterolateral leads     POC G3 - PUL    Collection Time: 01/07/22 10:42 PM   Result Value Ref Range    FIO2 (POC) 50 %    pH (POC) 7.40 7.35 - 7.45      pCO2 (POC) 41.3 35.0 - 45.0 MMHG    pO2 (POC) 97 80 - 100 MMHG    HCO3 (POC) 25.4 22 - 26 MMOL/L    sO2 (POC) 97.5 (H) 92 - 97 %    Base excess (POC) 0.5 mmol/L    Site RIGHT RADIAL      Device: BIPAP MASK      PEEP/CPAP (POC) 8 cmH2O    PIP (POC) 12      Specimen type (POC) ARTERIAL           Imaging:     Assessment:     Coco Capps is a [de-identified] y.o. female with hx of afib on eliquis, htn, ckd iv, cad s/p cabg, cva, mdd w/ jas, gerd, parkinson who is admitted for acute hypoxic respiratory failure secondary to decompensated HFpEF       Plan:       Acute hypoxic respiratory failure   -Likely secondary to decompensated HFpEF  -R/O infectious etiology  -Procalcitonin negative  -Check respiratory viral panel  -Bumex 2 mg IV twice daily  -Strict I's and O's  -Monitor renal function closely  -Wean off supplemental oxygen to achieve baseline 2 L    Atrial fibrillation  -Continue home Eliquis, metoprolol and diltiazem    Hypertension  -Home hydralazine, diltiazem and metoprolol    GERD  -Home Protonix    Parkinson's dementia/major depressive disorder/generalized anxiety disorder  -Continue home Sinemet, Abilify and Cymbalta    CKD stage IV  -Monitor renal function closely with diuresis  -Low threshold for nephro consult            FEN/GI -  PO hydration  Activity - as tolerated  DVT prophylaxis - Eliquis  GI prophylaxis -  NI  Disposition - TBD    CODE STATUS:  Full code       Signed By: Charlie Mcpherson MD     January 8, 2022

## 2022-01-08 NOTE — ED PROVIDER NOTES
HPI   27-year-old female with a past medical history of coronary artery disease, diastolic dysfunction, diabetes, atrial fibrillation on Eliquis, and hypertension who presents to the emergency department due to shortness of breath. Patient was recently admitted for diastolic heart failure exacerbation. She was reportedly at her nursing facility today when her 2 L of oxygen became disconnected. After that she became short of breath. EMS arrived and she was still hypoxic on 4 L and was placed on a nonrebreather. She denies chest pain. She endorses a slight cough. She denies fevers. She believes she has been compliant with her medications. She does endorse worsening leg swelling.     Past Medical History:   Diagnosis Date    Anxiety disorder     Atrial fibrillation (HCC)     CAD (coronary artery disease) 2007    stents, CABG x 3v    Carotid stenosis     Cervical stenosis of spinal canal 07/2019    Chronic kidney disease     Cough     CVA (cerebral vascular accident) ([facility] [address] 07/2019    left lacunar infarct at head of caudate    Depression     AND CHRONIC ANXIETY    Diabetes (HCC)     GERD (gastroesophageal reflux disease)     High cholesterol     History of peptic ulcer     Bleeding ulcer with increased NSAID use    Hypertension     Left carotid stenosis 07/2019    s/p left CEA with Dr. Army Cabrera, old 2007    PUD (peptic ulcer disease)     Stroke ([facility] [address]     Tremor     Valvular heart disease        Past Surgical History:   Procedure Laterality Date    COLONOSCOPY N/A 12/17/2021    COLONOSCOPY   :- performed by Meryle Bing, [facility] at [facility] ENDOSCOPY    HX CAROTID ENDARTERECTOMY  07/2019    HX CORONARY ARTERY BYPASS GRAFT      HX TONSILLECTOMY  1963    AL CARDIAC SURG PROCEDURE UNLIST      CABG X3 VESSEL    AL TOTAL KNEE ARTHROPLASTY Right 2015         Family History:   Problem Relation Age of Onset    Heart Attack Mother 72        Dec 79yo    Hypertension Mother    Geovanna Krause Other Father Unknown    Parkinson's Disease Brother     Anesth Problems Neg Hx        Social History     Socioeconomic History    Marital status: SINGLE     Spouse name: Not on file    Number of children: Not on file    Years of education: Not on file    Highest education level: Not on file   Occupational History    Occupation: Retired realestate/teacher   Tobacco Use    Smoking status: Former Smoker     Packs/day: 0.25     Years: 5.00     Pack years: 1.25     Types: Cigarettes     Quit date:      Years since quittin.0    Smokeless tobacco: Never Used   Substance and Sexual Activity    Alcohol use: Not Currently     Comment: Rare    Drug use: No    Sexual activity: Not on file   Other Topics Concern    Not on file   Social History Narrative    Lives in [facility]     Social Determinants of Health     Financial Resource Strain:     Difficulty of Paying Living Expenses: Not on file   Food Insecurity:     Worried About [address] in the Last Year: Not on file    [address] in the Last Year: Not on file   Transportation Needs:     Lack of Transportation (Medical): Not on file    Lack of Transportation (Non-Medical):  Not on file   Physical Activity:     Days of Exercise per Week: Not on file    Minutes of Exercise per Session: Not on file   Stress:     Feeling of Stress : Not on file   Social Connections:     Frequency of Communication with Friends and Family: Not on file    Frequency of Social Gatherings with Friends and Family: Not on file    Attends Jew Services: Not on file    Active Member of Clubs or Organizations: Not on file    Attends Club or Organization Meetings: Not on file    Marital Status: Not on file   Intimate Partner Violence:     Fear of Current or Ex-Partner: Not on file    Emotionally Abused: Not on file    Physically Abused: Not on file    Sexually Abused: Not on file   Housing Stability:     Unable to Pay for Housing in the Last Year: Not on file    Number of Places Lived in the Last Year: Not on file    Unstable Housing in the Last Year: Not on file         ALLERGIES: Patient has no known allergies. Review of Systems   A complete review of systems was performed and all systems reviewed were negative unless otherwise documented in the HPI. Vitals:    01/07/22 2027   BP: 136/86   Pulse: (!) 129   Resp: 26   Temp: 99.5 °F (37.5 °C)   SpO2: 97%            Physical Exam  Constitutional:       Comments: Ill-appearing. Not diaphoretic. Eyes:      General: No scleral icterus. Extraocular Movements: Extraocular movements intact. Neck:      Comments: JVD present. Trachea midline  Cardiovascular:      Comments: Tachycardic. Irregularly irregular rhythm. No appreciable murmurs  Pulmonary:      Comments: Tachypneic. Conversational dyspnea. Diffuse rales bilaterally. Abdominal:      General: There is no distension. Palpations: Abdomen is soft. Tenderness: There is no abdominal tenderness. Musculoskeletal:         General: Normal range of motion. Comments: 1+ lower extremity edema bilaterally   Skin:     General: Skin is warm and dry. Neurological:      Comments: Awake and alert. Speech is normal.  GCS 15          MDM   50-year-old female presents to the emergency department with the above chief complaint. Initially patient tachycardic with rates in the 120s to 130s. She is tachypneic. She was placed on BiPAP with improvement in her respiratory rate and tachycardia. EKG shows atrial fibrillation with a rate of 109 and QTC of 500. No concerning ST elevations or depressions noted. Patient sounds wet on exam lower extremity edema and rales bilaterally. She will be diuresed. COVID-19 testing will be ordered. Blood culture will be obtained, but I have a lower suspicion for an infectious cause. She will be admitted. Labs are reassuring. Chest x-ray does show evidence of pulmonary edema.   Patient did need rate control and she persistently had heart rates less than 110. Give her 1 mg of Bumex. She will be admitted to the stepdown unit. Critical Care  Performed by: Wendy Collins MD  Authorized by: Wendy Collins MD     Critical care provider statement:     Critical care time (minutes):  35    Critical care was necessary to treat or prevent imminent or life-threatening deterioration of the following conditions: Hypoxic darvin failure and CHF exacerbation requiring positive pressure ventilation. Critical care was time spent personally by me on the following activities:  Blood draw for specimens, development of treatment plan with patient or surrogate, discussions with consultants, evaluation of patient's response to treatment, obtaining history from patient or surrogate, ordering and performing treatments and interventions, ordering and review of laboratory studies, ordering and review of radiographic studies, pulse oximetry, re-evaluation of patient's condition and review of old charts    I assumed direction of critical care for this patient from another provider in my specialty: no          Perfect Serve Consult for Admission  10:29 PM    ED Room Number: ER05/05  Patient Name and age:  Ariana Courts [de-identified] y.o.  female  Working Diagnosis:   1. Acute on chronic respiratory failure with hypoxia (HCC)    2.  Acute on chronic congestive heart failure, unspecified heart failure type ([facility] [address]        COVID-19 Suspicion:  no  Sepsis present:  no  Reassessment needed: yes  Code Status:  Full Code  Readmission: yes  Isolation Requirements:  no  Recommended Level of Care:  step down  Department:  [facility] Adult ED - [phone]

## 2022-01-08 NOTE — ED NOTES
Paged MD Workman Sour about pt oxygen dropping in upper 70's, lower 80's and heart rate going into 150's. Verbal order to place pt on high flow, and if this fails to try bipap again if pt does not refuse.

## 2022-01-08 NOTE — PROGRESS NOTES
Patient seen for follow-up of hypoxic respiratory failure due to CHF exacerbation and A. fib RVR. Patient seen and examined earlier today by me. Patient is on high flow nasal cannula and had previously refused BiPAP. She is satting well but her heart rate is going up and down. Continuing home diltiazem and metoprolol. I consulted cardiology to get their opinion I appreciate the recommendations. Pending limited echo. Will continue to monitor.

## 2022-01-08 NOTE — ED NOTES
Emergency Room Nursing Communication Tool        Verbal shift change report given to Guanaco Crane RN (incoming nurse) by Lisbeth Aldana RN (outgoing nurse) on Ariana Courts a [de-identified] y.o. female and born 1940 who arrived at the hospital on 1/7/2022  8:18 PM. Report included the following information SBAR, Kardex, Recent Results and Med Rec Status. Significant changes during shift: Patient came in with SOB from her facility, initially placed on BIPAP, patient refused BIPAP after a few minutes and was downgraded to a NRB mask at 15L, tolerated well w/ sat at 95-96%. Patient Troponin came back at 80, Dr. Reed Haque informed w/ no orders received. Patient awaiting bed placement at this time. Issues for physician to address: none            Code Status: Full Code     Chief Complaint: Respiratory Distress     Admit Diagnosis: CHF (congestive heart failure) ([facility] [address] [I50.9]     Admitting Provider: Ricco Schmitz MD     Surgery: * No surgery found *     Infections: ESBL     Allergies: Patient has no known allergies.      Current diet: ADULT DIET Regular; 1000 ml     Lines:   Peripheral IV 01/07/22 Left Antecubital (Active)   Site Assessment Clean, dry, & intact 01/07/22 2108   Phlebitis Assessment 0 01/07/22 2108   Infiltration Assessment 0 01/07/22 2108   Dressing Status Clean, dry, & intact 01/07/22 2108   Dressing Type Transparent 01/07/22 2108   Hub Color/Line Status Pink 01/07/22 2108                Vital Signs:     Patient Vitals for the past 12 hrs:   Temp Pulse Resp BP SpO2   01/08/22 0745 -- (!) 111 21 (!) 148/89 98 %   01/08/22 0545 -- (!) 112 20 132/88 96 %   01/08/22 0515 -- (!) 105 21 (!) 138/90 94 %   01/08/22 0430 -- (!) 118 20 (!) 135/108 97 %   01/08/22 0300 -- (!) 113 21 (!) 139/91 96 %   01/08/22 0200 -- (!) 120 20 (!) 134/98 95 %   01/08/22 0100 -- 95 19 138/77 98 %   01/08/22 0000 -- (!) 110 19 (!) 127/95 96 %   01/07/22 2340 -- (!) 105 17 (!) 126/92 96 %   01/07/22 2230 -- (!) 124 19 129/83 96 %   01/07/22 2215 -- (!) 108 20 133/78 95 %   01/07/22 2133 -- -- -- -- 96 %   01/07/22 2130 -- (!) 104 23 128/85 96 %   01/07/22 2110 -- -- -- -- 99 %   01/07/22 2030 -- (!) 120 (!) 33 (!) 149/83 97 %   01/07/22 2027 99.5 °F (37.5 °C) (!) 129 26 136/86 97 %      Intake & Output:   No intake or output data in the 24 hours ending 01/08/22 0806   Laboratory Results:     Recent Results (from the past 12 hour(s))   CBC WITH AUTOMATED DIFF    Collection Time: 01/07/22  8:31 PM   Result Value Ref Range    WBC 9.0 3.6 - 11.0 K/uL    RBC 2.91 (L) 3.80 - 5.20 M/uL    HGB 9.5 (L) 11.5 - 16.0 g/dL    HCT 29.8 (L) 35.0 - 47.0 %    .4 (H) 80.0 - 99.0 FL    MCH 32.6 26.0 - 34.0 PG    MCHC 31.9 30.0 - 36.5 g/dL    RDW 13.7 11.5 - 14.5 %    PLATELET 476 400 - 289 K/uL    MPV 8.8 (L) 8.9 - 12.9 FL    NRBC 0.0 0  WBC    ABSOLUTE NRBC 0.00 0.00 - 0.01 K/uL    NEUTROPHILS 87 (H) 32 - 75 %    LYMPHOCYTES 5 (L) 12 - 49 %    MONOCYTES 6 5 - 13 %    EOSINOPHILS 1 0 - 7 %    BASOPHILS 0 0 - 1 %    IMMATURE GRANULOCYTES 1 (H) 0.0 - 0.5 %    ABS. NEUTROPHILS 7.8 1.8 - 8.0 K/UL    ABS. LYMPHOCYTES 0.5 (L) 0.8 - 3.5 K/UL    ABS. MONOCYTES 0.5 0.0 - 1.0 K/UL    ABS. EOSINOPHILS 0.1 0.0 - 0.4 K/UL    ABS. BASOPHILS 0.0 0.0 - 0.1 K/UL    ABS. IMM.  GRANS. 0.1 (H) 0.00 - 0.04 K/UL    DF SMEAR SCANNED      RBC COMMENTS MACROCYTOSIS  1+       METABOLIC PANEL, COMPREHENSIVE    Collection Time: 01/07/22  8:31 PM   Result Value Ref Range    Sodium 134 (L) 136 - 145 mmol/L    Potassium 4.9 3.5 - 5.1 mmol/L    Chloride 102 97 - 108 mmol/L    CO2 27 21 - 32 mmol/L    Anion gap 5 5 - 15 mmol/L    Glucose 229 (H) 65 - 100 mg/dL    BUN 45 (H) 6 - 20 MG/DL    Creatinine 2.15 (H) 0.55 - 1.02 MG/DL    BUN/Creatinine ratio 21 (H) 12 - 20      GFR est AA 27 (L) >60 ml/min/1.73m2    GFR est non-AA 22 (L) >60 ml/min/1.73m2    Calcium 9.0 8.5 - 10.1 MG/DL    Bilirubin, total 0.5 0.2 - 1.0 MG/DL    ALT (SGPT) 15 12 - 78 U/L    AST (SGOT) 14 (L) 15 - 37 U/L    Alk. phosphatase 101 45 - 117 U/L    Protein, total 6.8 6.4 - 8.2 g/dL    Albumin 3.6 3.5 - 5.0 g/dL    Globulin 3.2 2.0 - 4.0 g/dL    A-G Ratio 1.1 1.1 - 2.2     SAMPLES BEING HELD    Collection Time: 01/07/22  8:31 PM   Result Value Ref Range    SAMPLES BEING HELD 1red, 1blu     COMMENT        Add-on orders for these samples will be processed based on acceptable specimen integrity and analyte stability, which may vary by analyte.    LACTIC ACID    Collection Time: 01/07/22  8:37 PM   Result Value Ref Range    Lactic acid 1.6 0.4 - 2.0 MMOL/L   COVID-19 RAPID TEST    Collection Time: 01/07/22  8:37 PM   Result Value Ref Range    Specimen source Nasopharyngeal      COVID-19 rapid test Not detected NOTD     CULTURE, BLOOD, PAIRED    Collection Time: 01/07/22  8:37 PM    Specimen: Blood   Result Value Ref Range    Special Requests: NO SPECIAL REQUESTS      Culture result: NO GROWTH AFTER 7 HOURS     EKG, 12 LEAD, INITIAL    Collection Time: 01/07/22  8:56 PM   Result Value Ref Range    Ventricular Rate 109 BPM    QRS Duration 138 ms    Q-T Interval 372 ms    QTC Calculation (Bezet) 500 ms    Calculated R Axis -8 degrees    Calculated T Axis 136 degrees    Diagnosis       Atrial fibrillation with rapid ventricular response  Left bundle branch block  Abnormal ECG  When compared with ECG of 03-JAN-2022 10:53,  QRS axis shifted left  ST elevation now present in Anterior leads  T wave inversion no longer evident in Inferior leads  T wave inversion less evident in Anterolateral leads     POC G3 - PUL    Collection Time: 01/07/22 10:42 PM   Result Value Ref Range    FIO2 (POC) 50 %    pH (POC) 7.40 7.35 - 7.45      pCO2 (POC) 41.3 35.0 - 45.0 MMHG    pO2 (POC) 97 80 - 100 MMHG    HCO3 (POC) 25.4 22 - 26 MMOL/L    sO2 (POC) 97.5 (H) 92 - 97 %    Base excess (POC) 0.5 mmol/L    Site RIGHT RADIAL      Device: BIPAP MASK      PEEP/CPAP (POC) 8 cmH2O    PIP (POC) 12      Specimen type (POC) ARTERIAL TROPONIN-HIGH SENSITIVITY    Collection Time: 01/08/22  2:41 AM   Result Value Ref Range    Troponin-High Sensitivity 85 (HH) 0 - 51 ng/L   NT-PRO BNP    Collection Time: 01/08/22  2:41 AM   Result Value Ref Range    NT pro-BNP 20,345 (H) <450 PG/ML   MAGNESIUM    Collection Time: 01/08/22  2:41 AM   Result Value Ref Range    Magnesium 2.4 1.6 - 2.4 mg/dL   PROCALCITONIN    Collection Time: 01/08/22  2:41 AM   Result Value Ref Range    Procalcitonin 5.90 ng/mL   METABOLIC PANEL, BASIC    Collection Time: 01/08/22  2:41 AM   Result Value Ref Range    Sodium 135 (L) 136 - 145 mmol/L    Potassium 4.4 3.5 - 5.1 mmol/L    Chloride 103 97 - 108 mmol/L    CO2 25 21 - 32 mmol/L    Anion gap 7 5 - 15 mmol/L    Glucose 119 (H) 65 - 100 mg/dL    BUN 48 (H) 6 - 20 MG/DL    Creatinine 2.12 (H) 0.55 - 1.02 MG/DL    BUN/Creatinine ratio 23 (H) 12 - 20      GFR est AA 27 (L) >60 ml/min/1.73m2    GFR est non-AA 22 (L) >60 ml/min/1.73m2    Calcium 9.4 8.5 - 10.1 MG/DL   SAMPLES BEING HELD    Collection Time: 01/08/22  2:41 AM   Result Value Ref Range    SAMPLES BEING HELD 1LAV     COMMENT        Add-on orders for these samples will be processed based on acceptable specimen integrity and analyte stability, which may vary by analyte.    RESPIRATORY VIRUS PANEL W/COVID-19, PCR    Collection Time: 01/08/22  5:10 AM    Specimen: Nasopharyngeal   Result Value Ref Range    Adenovirus Not detected NOTD      Coronavirus 229E Not detected NOTD      Coronavirus HKU1 Not detected NOTD      Coronavirus CVNL63 Not detected NOTD      Coronavirus OC43 Not detected NOTD      SARS-CoV-2, PCR Not detected NOTD      Metapneumovirus Not detected NOTD      Rhinovirus and Enterovirus Not detected NOTD      Influenza A Not detected NOTD      Influenza A, subtype H1 Not detected NOTD      Influenza A, subtype H3 Not detected NOTD      INFLUENZA A H1N1 PCR Not detected NOTD      Influenza B Not detected NOTD      Parainfluenza 1 Not detected NOTD Parainfluenza 2 Not detected NOTD      Parainfluenza 3 Not detected NOTD      Parainfluenza virus 4 Not detected NOTD      RSV by PCR Not detected NOTD      B. parapertussis, PCR Not detected NOTD      Bordetella pertussis - PCR Not detected NOTD      Chlamydophila pneumoniae DNA, QL, PCR Not detected NOTD      Mycoplasma pneumoniae DNA, QL, PCR Not detected NOTD              Opportunity for questions and clarifications were given to the incoming nurse. Patient's bed locked and is in low position, side rails up x2, door open PRN, call bell within reach of patient and patient not in distress.       Signed by: Johanna Morocho, RN, DEYANIRA, BSN, [facility]                       1/8/2022 at 8:06 AM

## 2022-01-08 NOTE — PROGRESS NOTES
Physical Therapy  Orders received and chart reviewed. Noted decline in oxygen and increased heart rate. Will hold PT evaluation. Plan to follow up Monday unless more immediate need.   Mirlande Elias, PT

## 2022-01-08 NOTE — ED TRIAGE NOTES
Pt arrives by EMS from [facility] with CC of respiratory distress, per EMS, staff were moving her from one bed to another and removed her from her baseline 2L NC and she became tachypniec and 80% on 4L.      She arrives on a nonrebreather sating 96%    She states she has been vaccinated for COVID, hx of afib and CHF

## 2022-01-09 ENCOUNTER — APPOINTMENT (OUTPATIENT)
Dept: NON INVASIVE DIAGNOSTICS | Age: 82
DRG: 291 | End: 2022-01-09
Attending: SPECIALIST
Payer: MEDICARE

## 2022-01-09 LAB
ANION GAP SERPL CALC-SCNC: 4 MMOL/L (ref 5–15)
BASOPHILS # BLD: 0.1 K/UL (ref 0–0.1)
BASOPHILS NFR BLD: 1 % (ref 0–1)
BUN SERPL-MCNC: 46 MG/DL (ref 6–20)
BUN/CREAT SERPL: 24 (ref 12–20)
CALCIUM SERPL-MCNC: 8.8 MG/DL (ref 8.5–10.1)
CHLORIDE SERPL-SCNC: 105 MMOL/L (ref 97–108)
CO2 SERPL-SCNC: 28 MMOL/L (ref 21–32)
COMMENT, HOLDF: NORMAL
CREAT SERPL-MCNC: 1.95 MG/DL (ref 0.55–1.02)
DIFFERENTIAL METHOD BLD: ABNORMAL
ECHO LV EDV A2C: 151 ML
ECHO LV EDV A4C: 150 ML
ECHO LV EDV BP: 155 ML (ref 56–104)
ECHO LV EDV BP: 155 ML (ref 56–104)
ECHO LV EDV INDEX A4C: 83 ML/M2
ECHO LV EDV NDEX A2C: 83 ML/M2
ECHO LV EJECTION FRACTION A2C: 51 %
ECHO LV EJECTION FRACTION A4C: 40 %
ECHO LV EJECTION FRACTION BIPLANE: 47 % (ref 55–100)
ECHO LV EJECTION FRACTION BIPLANE: 47 % (ref 55–100)
ECHO LV ESV A2C: 75 ML
ECHO LV ESV A4C: 90 ML
ECHO LV ESV BP: 83 ML (ref 19–49)
ECHO LV ESV INDEX A2C: 41 ML/M2
ECHO LV ESV INDEX A4C: 50 ML/M2
ECHO LV ESV INDEX BP: 46 ML/M2
ECHO LV FRACTIONAL SHORTENING: 21 % (ref 28–44)
ECHO LV INTERNAL DIMENSION DIASTOLE INDEX: 2.6 CM/M2
ECHO LV INTERNAL DIMENSION DIASTOLIC: 4.7 CM (ref 3.9–5.3)
ECHO LV INTERNAL DIMENSION SYSTOLIC INDEX: 2.04 CM/M2
ECHO LV INTERNAL DIMENSION SYSTOLIC: 3.7 CM
ECHO LV IVSD: 1.2 CM (ref 0.6–0.9)
ECHO LV MASS 2D: 212 G (ref 67–162)
ECHO LV MASS INDEX 2D: 117.1 G/M2 (ref 43–95)
ECHO LV POSTERIOR WALL DIASTOLIC: 1.2 CM (ref 0.6–0.9)
ECHO LV RELATIVE WALL THICKNESS RATIO: 0.51
EOSINOPHIL # BLD: 0.2 K/UL (ref 0–0.4)
EOSINOPHIL NFR BLD: 3 % (ref 0–7)
ERYTHROCYTE [DISTWIDTH] IN BLOOD BY AUTOMATED COUNT: 13.7 % (ref 11.5–14.5)
GLUCOSE SERPL-MCNC: 124 MG/DL (ref 65–100)
HCT VFR BLD AUTO: 25.3 % (ref 35–47)
HGB BLD-MCNC: 8 G/DL (ref 11.5–16)
IMM GRANULOCYTES # BLD AUTO: 0 K/UL (ref 0–0.04)
IMM GRANULOCYTES NFR BLD AUTO: 0 % (ref 0–0.5)
LYMPHOCYTES # BLD: 0.5 K/UL (ref 0.8–3.5)
LYMPHOCYTES NFR BLD: 9 % (ref 12–49)
MCH RBC QN AUTO: 32.4 PG (ref 26–34)
MCHC RBC AUTO-ENTMCNC: 31.6 G/DL (ref 30–36.5)
MCV RBC AUTO: 102.4 FL (ref 80–99)
MONOCYTES # BLD: 0.5 K/UL (ref 0–1)
MONOCYTES NFR BLD: 9 % (ref 5–13)
NEUTS SEG # BLD: 4.6 K/UL (ref 1.8–8)
NEUTS SEG NFR BLD: 78 % (ref 32–75)
NRBC # BLD: 0 K/UL (ref 0–0.01)
NRBC BLD-RTO: 0 PER 100 WBC
PLATELET # BLD AUTO: 252 K/UL (ref 150–400)
PMV BLD AUTO: 8.9 FL (ref 8.9–12.9)
POTASSIUM SERPL-SCNC: 3.9 MMOL/L (ref 3.5–5.1)
RBC # BLD AUTO: 2.47 M/UL (ref 3.8–5.2)
RBC MORPH BLD: ABNORMAL
SAMPLES BEING HELD,HOLD: NORMAL
SODIUM SERPL-SCNC: 137 MMOL/L (ref 136–145)
WBC # BLD AUTO: 5.9 K/UL (ref 3.6–11)

## 2022-01-09 PROCEDURE — 94762 N-INVAS EAR/PLS OXIMTRY CONT: CPT

## 2022-01-09 PROCEDURE — 77010033711 HC HIGH FLOW OXYGEN

## 2022-01-09 PROCEDURE — 65660000000 HC RM CCU STEPDOWN

## 2022-01-09 PROCEDURE — 74011000250 HC RX REV CODE- 250: Performed by: STUDENT IN AN ORGANIZED HEALTH CARE EDUCATION/TRAINING PROGRAM

## 2022-01-09 PROCEDURE — 85025 COMPLETE CBC W/AUTO DIFF WBC: CPT

## 2022-01-09 PROCEDURE — 36415 COLL VENOUS BLD VENIPUNCTURE: CPT

## 2022-01-09 PROCEDURE — 80048 BASIC METABOLIC PNL TOTAL CA: CPT

## 2022-01-09 PROCEDURE — 77030038269 HC DRN EXT URIN PURWCK BARD -A

## 2022-01-09 PROCEDURE — 93308 TTE F-UP OR LMTD: CPT | Performed by: SPECIALIST

## 2022-01-09 PROCEDURE — 74011250637 HC RX REV CODE- 250/637: Performed by: STUDENT IN AN ORGANIZED HEALTH CARE EDUCATION/TRAINING PROGRAM

## 2022-01-09 PROCEDURE — C8923 2D TTE W OR W/O FOL W/CON,CO: HCPCS

## 2022-01-09 PROCEDURE — 93325 DOPPLER ECHO COLOR FLOW MAPG: CPT | Performed by: SPECIALIST

## 2022-01-09 RX ADMIN — Medication 6 MG: at 21:55

## 2022-01-09 RX ADMIN — CILOSTAZOL 100 MG: 50 TABLET ORAL at 18:06

## 2022-01-09 RX ADMIN — METOPROLOL TARTRATE 50 MG: 50 TABLET, FILM COATED ORAL at 08:39

## 2022-01-09 RX ADMIN — DULOXETINE HYDROCHLORIDE 120 MG: 60 CAPSULE, DELAYED RELEASE ORAL at 08:38

## 2022-01-09 RX ADMIN — CLOPIDOGREL 75 MG: 75 TABLET, FILM COATED ORAL at 08:39

## 2022-01-09 RX ADMIN — ATORVASTATIN CALCIUM 40 MG: 40 TABLET, FILM COATED ORAL at 21:56

## 2022-01-09 RX ADMIN — BUMETANIDE 1 MG: 0.25 INJECTION INTRAMUSCULAR; INTRAVENOUS at 08:39

## 2022-01-09 RX ADMIN — CARBIDOPA AND LEVODOPA 2 TABLET: 25; 100 TABLET ORAL at 08:39

## 2022-01-09 RX ADMIN — HYDRALAZINE HYDROCHLORIDE 50 MG: 50 TABLET, FILM COATED ORAL at 18:13

## 2022-01-09 RX ADMIN — APIXABAN 2.5 MG: 2.5 TABLET, FILM COATED ORAL at 18:06

## 2022-01-09 RX ADMIN — Medication 10 ML: at 14:00

## 2022-01-09 RX ADMIN — HYDRALAZINE HYDROCHLORIDE 50 MG: 50 TABLET, FILM COATED ORAL at 21:56

## 2022-01-09 RX ADMIN — CARBIDOPA AND LEVODOPA 2 TABLET: 25; 100 TABLET ORAL at 12:26

## 2022-01-09 RX ADMIN — BUSPIRONE HYDROCHLORIDE 5 MG: 5 TABLET ORAL at 18:06

## 2022-01-09 RX ADMIN — BUSPIRONE HYDROCHLORIDE 5 MG: 5 TABLET ORAL at 08:39

## 2022-01-09 RX ADMIN — DILTIAZEM HYDROCHLORIDE 120 MG: 120 CAPSULE, COATED, EXTENDED RELEASE ORAL at 08:39

## 2022-01-09 RX ADMIN — PANTOPRAZOLE SODIUM 40 MG: 40 TABLET, DELAYED RELEASE ORAL at 08:39

## 2022-01-09 RX ADMIN — METOPROLOL TARTRATE 50 MG: 50 TABLET, FILM COATED ORAL at 21:56

## 2022-01-09 RX ADMIN — CILOSTAZOL 100 MG: 50 TABLET ORAL at 08:39

## 2022-01-09 RX ADMIN — BUSPIRONE HYDROCHLORIDE 5 MG: 5 TABLET ORAL at 21:55

## 2022-01-09 RX ADMIN — CARBIDOPA AND LEVODOPA 2 TABLET: 25; 100 TABLET ORAL at 21:56

## 2022-01-09 RX ADMIN — ARIPIPRAZOLE 20 MG: 10 TABLET ORAL at 08:39

## 2022-01-09 RX ADMIN — GABAPENTIN 100 MG: 100 CAPSULE ORAL at 21:55

## 2022-01-09 RX ADMIN — APIXABAN 2.5 MG: 2.5 TABLET, FILM COATED ORAL at 08:39

## 2022-01-09 RX ADMIN — CARBIDOPA AND LEVODOPA 2 TABLET: 25; 100 TABLET ORAL at 18:06

## 2022-01-09 RX ADMIN — Medication 10 ML: at 21:56

## 2022-01-09 RX ADMIN — HYDRALAZINE HYDROCHLORIDE 50 MG: 50 TABLET, FILM COATED ORAL at 08:39

## 2022-01-09 RX ADMIN — BUMETANIDE 1 MG: 0.25 INJECTION INTRAMUSCULAR; INTRAVENOUS at 22:40

## 2022-01-09 NOTE — ROUTINE PROCESS
TRANSFER - OUT REPORT:    Verbal report given to [facility] on Lloyd Thurston  being transferred to [facility] for routine progression of care       Report consisted of patients Situation, Background, Assessment and   Recommendations(SBAR). Information from the following report(s) SBAR, ED Summary, Recent Results and Cardiac Rhythm afib was reviewed with the receiving nurse. Lines:   Peripheral IV 01/07/22 Left Antecubital (Active)   Site Assessment Clean, dry, & intact 01/07/22 2108   Phlebitis Assessment 0 01/07/22 2108   Infiltration Assessment 0 01/07/22 2108   Dressing Status Clean, dry, & intact 01/07/22 2108   Dressing Type Transparent 01/07/22 2108   Hub Color/Line Status Pink 01/07/22 2108        Opportunity for questions and clarification was provided.       Patient transported with:   Monitor  O2 @ 15L NRB liters  Registered Nurse  [facility]

## 2022-01-09 NOTE — PROGRESS NOTES
[facility] [facility] Adult  Hospitalist Group                                                                                          Hospitalist Progress Note  Shanice Bowden MD  Answering service: [phone] from in house phone        Date of Service:  2022  NAME:  Scot Parisi  :  1940  MRN:  [id number]      Admission Summary:   Scot Parisi is a [de-identified] y.o. female with hx of afib on eliquis, htn, ckd iv, cad s/p cabg, cva, mdd w/ jas, gerd, parkinson who presents to hospital with complaints of sob. Patient was recently just discharged from hospital after admission for acute and chronic respiratory failure with hypoxia deemed secondary to CHF. Patient was discharged on 2 L of oxygen. She is seen and examined at bedside. States she has felt persistently short of breath over the last 24 hours. She denies any fever, chills, chest or abdominal pain, nausea, vomiting, cough, congestion, recent illness, palpitations, or dysuria. Per chart review, patient had been out of her baseline 2 L oxygen for an unknown amount of time. She was found hypoxic in low 80s and placed on 4 L nasal cannula. Patient was persistently hypoxic and tachypneic despite this and was therefore transferred to hospital for additional evaluation. Interval history / Subjective:   Patient seen and examined for follow-up of shortness of breath, CHF exacerbation, and atrial fibrillation. Patient seen and examined earlier today by me. Patient looks much more comfortable than yesterday. No acute shortness of breath and no acute complaints.      Assessment & Plan:     Shortness of breath secondary to acute CHF and exacerbation  Patient has diastolic CHF with previous hospitalization and now rehospitalization for continued shortness of breath  Cardiology on board  Patient is diuresing on Bumex 1 mg IV twice daily we will continue  Daily weights  Strict I's and O's  Limited echo shows EF of 40 to 45% with left ventricular size normal and mild global hypokinesis. Elevated troponin   not significant    Atrial fibrillation  Continue metoprolol and Cardizem   Continue Eliquis    Hypertension  On hydralazine, diltiazem, metoprolol    GERD  Protonix    Parkinson's dementia/major depression with disorder/generalized anxiety  Carbidopa-levodopa    CKD stage IV  Kidney function improving with diuresis    Code status: DNR  DVT prophylaxis: eliquis    Care Plan discussed with: Patient/Family and Nurse  Anticipated Disposition: SNF/LTC and SAH/Rehab  Anticipated Discharge: 24 hours to 50 hours     Hospital Problems  Date Reviewed: 1/6/2022          Codes Class Noted POA    CHF (congestive heart failure) ([facility] [address] ICD-10-CM: I50.9  ICD-9-CM: 428.0  1/8/2022 Unknown                Review of Systems:   A comprehensive review of systems was negative except for that written in the HPI. Vital Signs:    Last 24hrs VS reviewed since prior progress note. Most recent are:  Visit Vitals  /78   Pulse 83   Temp 97.6 °F (36.4 °C)   Resp 18   Ht 5' 5\" (1.651 m)   Wt 74 kg (163 lb 2.3 oz)   SpO2 100%   BMI 27.15 kg/m²       No intake or output data in the 24 hours ending 01/09/22 1820     Physical Examination:     I had a face to face encounter with this patient and independently examined them on 1/9/2022 as outlined below:          Constitutional:  No acute distress, cooperative   ENT:  Oral mucosa moist, oropharynx benign. Resp:  CTA bilaterally but with decreased breath sounds bilaterally. No wheezing/rhonchi/rales. No accessory muscle use   CV:  Irregular rhythm, normal rate, no murmurs, gallops, rubs    GI:  Soft, non distended, non tender. normoactive bowel sounds, no hepatosplenomegaly     Musculoskeletal:  No edema, warm, 2+ pulses throughout    Neurologic:  Moves all extremities.   AAOx3, CN II-XII reviewed            Data Review:    Review and/or order of clinical lab test  Review and/or order of tests in the radiology section of CPT  Review and/or order of tests in the medicine section of CPT      Labs:     Recent Labs     01/09/22 0441 01/07/22 2031   WBC 5.9 9.0   HGB 8.0* 9.5*   HCT 25.3* 29.8*    345     Recent Labs     01/09/22  0441 01/08/22  0241 01/07/22 2031    135* 134*   K 3.9 4.4 4.9    103 102   CO2 28 25 27   BUN 46* 48* 45*   CREA 1.95* 2.12* 2.15*   * 119* 229*   CA 8.8 9.4 9.0   MG  --  2.4  --      Recent Labs     01/07/22 2031   ALT 15      TBILI 0.5   TP 6.8   ALB 3.6   GLOB 3.2     No results for input(s): INR, PTP, APTT, INREXT in the last 72 hours. No results for input(s): FE, TIBC, PSAT, FERR in the last 72 hours. Lab Results   Component Value Date/Time    Folate 7.8 12/14/2021 04:40 AM      No results for input(s): PH, PCO2, PO2 in the last 72 hours. No results for input(s): CPK, CKNDX, TROIQ in the last 72 hours.     No lab exists for component: CPKMB  Lab Results   Component Value Date/Time    Cholesterol, total 148 09/23/2020 04:27 AM    HDL Cholesterol 52 09/23/2020 04:27 AM    LDL, calculated 83.8 09/23/2020 04:27 AM    Triglyceride 61 09/23/2020 04:27 AM    CHOL/HDL Ratio 2.8 09/23/2020 04:27 AM     Lab Results   Component Value Date/Time    Glucose (POC) 108 12/21/2021 06:15 AM    Glucose (POC) 148 (H) 12/20/2021 09:54 PM    Glucose (POC) 156 (H) 12/18/2021 11:53 AM    Glucose (POC) 96 12/18/2021 10:18 AM    Glucose (POC) 120 (H) 04/24/2021 04:54 PM     Lab Results   Component Value Date/Time    Color YELLOW/STRAW 12/30/2021 08:23 PM    Appearance CLOUDY (A) 12/30/2021 08:23 PM    Specific gravity 1.017 12/30/2021 08:23 PM    pH (UA) 6.0 12/30/2021 08:23 PM    Protein 100 (A) 12/30/2021 08:23 PM    Glucose Negative 12/30/2021 08:23 PM    Ketone Negative 12/30/2021 08:23 PM    Bilirubin Negative 12/30/2021 08:23 PM    Urobilinogen 1.0 12/30/2021 08:23 PM    Nitrites Negative 12/30/2021 08:23 PM    Leukocyte Esterase MODERATE (A) 12/30/2021 08:23 PM Epithelial cells FEW 12/30/2021 08:23 PM    Bacteria 1+ (A) 12/30/2021 08:23 PM    WBC 0-4 12/30/2021 08:23 PM    RBC 0-5 12/30/2021 08:23 PM         Medications Reviewed:     Current Facility-Administered Medications   Medication Dose Route Frequency    perflutren lipid microspheres (DEFINITY) in NS bolus IV  1.5 mL IntraVENous RAD ONCE    apixaban (ELIQUIS) tablet 2.5 mg  2.5 mg Oral BID    ARIPiprazole (ABILIFY) tablet 20 mg  20 mg Oral DAILY    atorvastatin (LIPITOR) tablet 40 mg  40 mg Oral QHS    busPIRone (BUSPAR) tablet 5 mg  5 mg Oral TID    carbidopa-levodopa (SINEMET)  mg per tablet 2 Tablet  2 Tablet Oral QID    cilostazoL (PLETAL) tablet 100 mg  100 mg Oral ACB&D    clopidogreL (PLAVIX) tablet 75 mg  75 mg Oral DAILY AFTER BREAKFAST    dilTIAZem ER (CARDIZEM CD) capsule 120 mg  120 mg Oral DAILY    DULoxetine (CYMBALTA) capsule 120 mg  120 mg Oral DAILY    gabapentin (NEURONTIN) capsule 100 mg  100 mg Oral QHS    hydrALAZINE (APRESOLINE) tablet 50 mg  50 mg Oral TID    melatonin tablet 6 mg  6 mg Oral QHS    pantoprazole (PROTONIX) tablet 40 mg  40 mg Oral ACB    sodium chloride (NS) flush 5-40 mL  5-40 mL IntraVENous Q8H    sodium chloride (NS) flush 5-40 mL  5-40 mL IntraVENous PRN    acetaminophen (TYLENOL) tablet 650 mg  650 mg Oral Q6H PRN    Or    acetaminophen (TYLENOL) suppository 650 mg  650 mg Rectal Q6H PRN    polyethylene glycol (MIRALAX) packet 17 g  17 g Oral DAILY PRN    ondansetron (ZOFRAN ODT) tablet 4 mg  4 mg Oral Q8H PRN    Or    ondansetron (ZOFRAN) injection 4 mg  4 mg IntraVENous Q6H PRN    metoprolol tartrate (LOPRESSOR) tablet 50 mg  50 mg Oral Q12H    bumetanide (BUMEX) injection 1 mg  1 mg IntraVENous Q12H     ______________________________________________________________________  EXPECTED LENGTH OF STAY: - - -  ACTUAL LENGTH OF STAY:          Ginger Turcios MD

## 2022-01-09 NOTE — PROGRESS NOTES
Care Management - Called patient's niece/mPOA - Kyara Jones ([phone]) to complete initial assessment. Left voicemail for her to return call to this CM. Per chart review, patient has a DDNR dated 4-27-21. She is currently listed as full code. Sent MD Perfect Serve message to update him. Recent admissions:  10-29-21 to 11-3-21 IP cellulitis and abscess lower extremity, sepsis, UTI. Discharged to [address] [place] [address].  12-10-21 to 12-21-21 IP for acute hypoxic respiratory failure due to acute pulmonary edema and possible pneumonia. Discharged back to [facility].  12-23-21 to 1-6-22 IP for acute on chronic respiratory failure with hypoxia and acute on chronic heart failure. Discharged back to [facility].   1-7-22 IP for CHF      LADI Yen

## 2022-01-09 NOTE — PROGRESS NOTES
TRANSFER - IN REPORT:    Verbal report received from [facility] (name) on Ariana Courts  being received from ED  for routine progression of care      Report consisted of patients Situation, Background, Assessment and   Recommendations(SBAR). Information from the following report(s) SBAR, Kardex, ED Summary, Procedure Summary, Intake/Output, MAR, Recent Results and Cardiac Rhythm afib was reviewed with the receiving nurse. Opportunity for questions and clarification was provided. Assessment completed upon patients arrival to unit and care assumed.

## 2022-01-09 NOTE — ED NOTES
Bedside and Verbal shift change report given to Lamar Alvarez  (oncoming nurse) by Deanne Napier RN (offgoing nurse). Report included the following information SBAR, ED Summary and MAR.

## 2022-01-09 NOTE — ED NOTES
Verbal shift change report given to Margarita Cline RN (oncoming nurse) by Cornell Greco RN (offgoing nurse). Report included the following information SBAR, ED Summary, Intake/Output, MAR and Recent Results.

## 2022-01-10 LAB
ANION GAP SERPL CALC-SCNC: 5 MMOL/L (ref 5–15)
BASOPHILS # BLD: 0.1 K/UL (ref 0–0.1)
BASOPHILS NFR BLD: 1 % (ref 0–1)
BNP SERPL-MCNC: ABNORMAL PG/ML
BUN SERPL-MCNC: 53 MG/DL (ref 6–20)
BUN/CREAT SERPL: 27 (ref 12–20)
CALCIUM SERPL-MCNC: 8.5 MG/DL (ref 8.5–10.1)
CHLORIDE SERPL-SCNC: 103 MMOL/L (ref 97–108)
CO2 SERPL-SCNC: 27 MMOL/L (ref 21–32)
CREAT SERPL-MCNC: 2 MG/DL (ref 0.55–1.02)
DIFFERENTIAL METHOD BLD: ABNORMAL
EOSINOPHIL # BLD: 0.3 K/UL (ref 0–0.4)
EOSINOPHIL NFR BLD: 6 % (ref 0–7)
ERYTHROCYTE [DISTWIDTH] IN BLOOD BY AUTOMATED COUNT: 13.5 % (ref 11.5–14.5)
GLUCOSE SERPL-MCNC: 125 MG/DL (ref 65–100)
HCT VFR BLD AUTO: 22.8 % (ref 35–47)
HCT VFR BLD AUTO: 23.9 % (ref 35–47)
HGB BLD-MCNC: 7.1 G/DL (ref 11.5–16)
HGB BLD-MCNC: 7.4 G/DL (ref 11.5–16)
IMM GRANULOCYTES # BLD AUTO: 0 K/UL (ref 0–0.04)
IMM GRANULOCYTES NFR BLD AUTO: 0 % (ref 0–0.5)
LYMPHOCYTES # BLD: 0.7 K/UL (ref 0.8–3.5)
LYMPHOCYTES NFR BLD: 12 % (ref 12–49)
MCH RBC QN AUTO: 32.6 PG (ref 26–34)
MCHC RBC AUTO-ENTMCNC: 31 G/DL (ref 30–36.5)
MCV RBC AUTO: 105.3 FL (ref 80–99)
MONOCYTES # BLD: 0.7 K/UL (ref 0–1)
MONOCYTES NFR BLD: 12 % (ref 5–13)
NEUTS SEG # BLD: 4 K/UL (ref 1.8–8)
NEUTS SEG NFR BLD: 69 % (ref 32–75)
NRBC # BLD: 0 K/UL (ref 0–0.01)
NRBC BLD-RTO: 0 PER 100 WBC
PLATELET # BLD AUTO: 232 K/UL (ref 150–400)
PMV BLD AUTO: 9.2 FL (ref 8.9–12.9)
POTASSIUM SERPL-SCNC: 4.1 MMOL/L (ref 3.5–5.1)
RBC # BLD AUTO: 2.27 M/UL (ref 3.8–5.2)
RBC MORPH BLD: ABNORMAL
SODIUM SERPL-SCNC: 135 MMOL/L (ref 136–145)
WBC # BLD AUTO: 5.8 K/UL (ref 3.6–11)

## 2022-01-10 PROCEDURE — 83880 ASSAY OF NATRIURETIC PEPTIDE: CPT

## 2022-01-10 PROCEDURE — 80048 BASIC METABOLIC PNL TOTAL CA: CPT

## 2022-01-10 PROCEDURE — 97530 THERAPEUTIC ACTIVITIES: CPT

## 2022-01-10 PROCEDURE — 85025 COMPLETE CBC W/AUTO DIFF WBC: CPT

## 2022-01-10 PROCEDURE — 74011000250 HC RX REV CODE- 250: Performed by: STUDENT IN AN ORGANIZED HEALTH CARE EDUCATION/TRAINING PROGRAM

## 2022-01-10 PROCEDURE — 97165 OT EVAL LOW COMPLEX 30 MIN: CPT

## 2022-01-10 PROCEDURE — 99233 SBSQ HOSP IP/OBS HIGH 50: CPT | Performed by: SPECIALIST

## 2022-01-10 PROCEDURE — 97162 PT EVAL MOD COMPLEX 30 MIN: CPT

## 2022-01-10 PROCEDURE — 77010033678 HC OXYGEN DAILY

## 2022-01-10 PROCEDURE — 65660000000 HC RM CCU STEPDOWN

## 2022-01-10 PROCEDURE — 85018 HEMOGLOBIN: CPT

## 2022-01-10 PROCEDURE — 94760 N-INVAS EAR/PLS OXIMETRY 1: CPT

## 2022-01-10 PROCEDURE — 36415 COLL VENOUS BLD VENIPUNCTURE: CPT

## 2022-01-10 PROCEDURE — 74011250636 HC RX REV CODE- 250/636: Performed by: INTERNAL MEDICINE

## 2022-01-10 PROCEDURE — 74011250637 HC RX REV CODE- 250/637: Performed by: STUDENT IN AN ORGANIZED HEALTH CARE EDUCATION/TRAINING PROGRAM

## 2022-01-10 RX ORDER — ALBUMIN HUMAN 250 G/1000ML
25 SOLUTION INTRAVENOUS ONCE
Status: COMPLETED | OUTPATIENT
Start: 2022-01-11 | End: 2022-01-11

## 2022-01-10 RX ADMIN — GABAPENTIN 100 MG: 100 CAPSULE ORAL at 21:50

## 2022-01-10 RX ADMIN — CILOSTAZOL 100 MG: 50 TABLET ORAL at 06:43

## 2022-01-10 RX ADMIN — Medication 10 ML: at 21:50

## 2022-01-10 RX ADMIN — HYDRALAZINE HYDROCHLORIDE 50 MG: 50 TABLET, FILM COATED ORAL at 09:45

## 2022-01-10 RX ADMIN — ATORVASTATIN CALCIUM 40 MG: 40 TABLET, FILM COATED ORAL at 21:49

## 2022-01-10 RX ADMIN — METOPROLOL TARTRATE 50 MG: 50 TABLET, FILM COATED ORAL at 09:45

## 2022-01-10 RX ADMIN — HYDRALAZINE HYDROCHLORIDE 50 MG: 50 TABLET, FILM COATED ORAL at 21:50

## 2022-01-10 RX ADMIN — CARBIDOPA AND LEVODOPA 2 TABLET: 25; 100 TABLET ORAL at 17:55

## 2022-01-10 RX ADMIN — PANTOPRAZOLE SODIUM 40 MG: 40 TABLET, DELAYED RELEASE ORAL at 06:43

## 2022-01-10 RX ADMIN — BUSPIRONE HYDROCHLORIDE 5 MG: 5 TABLET ORAL at 21:50

## 2022-01-10 RX ADMIN — CARBIDOPA AND LEVODOPA 2 TABLET: 25; 100 TABLET ORAL at 21:49

## 2022-01-10 RX ADMIN — CLOPIDOGREL 75 MG: 75 TABLET, FILM COATED ORAL at 09:45

## 2022-01-10 RX ADMIN — BUSPIRONE HYDROCHLORIDE 5 MG: 5 TABLET ORAL at 09:45

## 2022-01-10 RX ADMIN — CARBIDOPA AND LEVODOPA 2 TABLET: 25; 100 TABLET ORAL at 09:45

## 2022-01-10 RX ADMIN — DILTIAZEM HYDROCHLORIDE 120 MG: 120 CAPSULE, COATED, EXTENDED RELEASE ORAL at 09:45

## 2022-01-10 RX ADMIN — APIXABAN 2.5 MG: 2.5 TABLET, FILM COATED ORAL at 09:45

## 2022-01-10 RX ADMIN — METOPROLOL TARTRATE 50 MG: 50 TABLET, FILM COATED ORAL at 21:50

## 2022-01-10 RX ADMIN — CARBIDOPA AND LEVODOPA 2 TABLET: 25; 100 TABLET ORAL at 13:20

## 2022-01-10 RX ADMIN — APIXABAN 2.5 MG: 2.5 TABLET, FILM COATED ORAL at 17:55

## 2022-01-10 RX ADMIN — BUSPIRONE HYDROCHLORIDE 5 MG: 5 TABLET ORAL at 15:46

## 2022-01-10 RX ADMIN — DULOXETINE HYDROCHLORIDE 120 MG: 60 CAPSULE, DELAYED RELEASE ORAL at 09:45

## 2022-01-10 RX ADMIN — Medication 10 ML: at 14:00

## 2022-01-10 RX ADMIN — CILOSTAZOL 100 MG: 50 TABLET ORAL at 15:46

## 2022-01-10 RX ADMIN — SODIUM CHLORIDE 250 ML: 9 INJECTION, SOLUTION INTRAVENOUS at 23:54

## 2022-01-10 RX ADMIN — Medication 6 MG: at 21:49

## 2022-01-10 RX ADMIN — ARIPIPRAZOLE 20 MG: 10 TABLET ORAL at 09:45

## 2022-01-10 RX ADMIN — BUMETANIDE 1 MG: 0.25 INJECTION INTRAMUSCULAR; INTRAVENOUS at 21:54

## 2022-01-10 NOTE — PROGRESS NOTES
Physician Progress Note      PATIENT:               Yahir Fiore  CSN #:                  [id number]  :                       1940  ADMIT DATE:       2022 8:18 PM  [address] [place] DATE:  RESPONDING  PROVIDER #:        David Sun MD          QUERY TEXT:    Patient admitted with A/C CHF, noted to have Atrial Fibrillation. If possible, please document in progress notes and discharge summary further specificity regarding the type of atrial fibrillation: The medical record reflects the following:  Risk Factors: hx of CAD  Clinical Indicators: admitted with increased SOB/ hypoxia, with slight cough. , RR 26 on admission. Found to have A/C Diastolic CHF and Acute Resp Failure, with documentation of A-Fib as well. HR  this admission. Home meds of Diltiazem, Eliquis, Toprol. Treatment: Continued on home meds of Cardizem, Eliquis, Plavix, Toprol, telemetry, Cardiac consult. Chronic: nonspecific term that could be referring to paroxysmal, persistent, or permanent  Longstanding persistent: persistent and continuous, lasting > 1 year. Paroxysmal - self-terminating or intermittent; resolves with or without intervention within 7 days of onset; may recur with various frequency. Persistent - Fails to terminate within 7 days; Often requires meds or cardioversion to restore to NSR. Permanent - longstanding & persistent; Medication has been ineffective in restoring NSR &/or cardioversion is contraindicated    Definitions per [employer] Training Guide and Quick Reference Guide, Ravin Calabrese 112 5 Diseases and Disorders of the Circulatory System; 2019; [employer]. Software content from the [employer]?  Advanced [employer] Transformation Program.    Thank you,  Valencia Lou RN  Clinical Documentation  [phone]  Options provided:  -- Paroxysmal Atrial Fibrillation  -- Longstanding Persistent Atrial Fibrillation  -- Permanent Atrial Fibrillation  -- Persistent Atrial Fibrillation  -- Chronic Atrial Fibrillation, unspecified  -- Other - I will add my own diagnosis  -- Disagree - Not applicable / Not valid  -- Disagree - Clinically unable to determine / Unknown  -- Refer to Clinical Documentation Reviewer    PROVIDER RESPONSE TEXT:    This patient has persistent atrial fibrillation.     Query created by: Aliayh Dodson on 1/10/2022 2:55 PM      Electronically signed by:  Ainsley Hollins MD 1/10/2022 3:04 PM

## 2022-01-10 NOTE — PROGRESS NOTES
Problem: Self Care Deficits Care Plan (Adult)  Goal: *Acute Goals and Plan of Care (Insert Text)  Description: FUNCTIONAL STATUS PRIOR TO ADMISSION:Per pt report and chart review, pt was functional at the wheelchair level and mod I with functional transfers to the w/c prior to last hospital admission. Pt admitted most recently from [facility] long term care. HOME SUPPORT: The patient was in long term care at [facility]. However, prior to Nov 2021 pt had her own apartment and a niece living locally to provide intermittent assistance. Occupational Therapy Goals  Initiated 1/10/2022  1. Patient will perform grooming tasks seated EOB with modified independence within 7 day(s). 2.  Patient will perform lower body dressing with minimal assistance/contact guard assist within 7 day(s). 3.  Patient will perform upper body dressing with modified independence within 7 day(s). 4.  Patient will perform toilet transfers with minimal assistance/contact guard assist within 7 day(s). 5.  Patient will perform all aspects of toileting with minimal assistance/contact guard assist within 7 day(s). 6.  Patient will participate in upper extremity therapeutic exercise/activities with supervision/set-up for 5 minutes within 7 day(s). Outcome: Not Met   OCCUPATIONAL THERAPY EVALUATION  Patient: Hernando Armijo (56 y.o. female)  Date: 1/10/2022  Primary Diagnosis: CHF (congestive heart failure) ([facility]) [I50.9]        Precautions:   Fall,Contact    ASSESSMENT  Based on the objective data described below, the patient presents below her functional baseline s/p admission for SOB, afib, and CHF exacerbation. Pt recently admitted to [facility] with OT evaluation on 1/3/2022 and d/c to [facility] to long term care. Her performance of ADL/IADL tasks is currently limited by generalized weakness, impaired balance, decreased activity tolerance/endurance, and decreased strength.  BP remained low, but stable with positional changes and pt asymptomatic. Current Level of Function Impacting Discharge (ADLs/self-care): up to max A inferred LB ADLs, set up to min A inferred seated UB ADLs    Functional Outcome Measure: The patient scored Total: 40/100 on the Barthel Index outcome measure which is indicative of being partially dependent in basic self-care. Other factors to consider for discharge: PLOF, recent hospital admissions     Patient will benefit from skilled therapy intervention to address the above noted impairments. PLAN :  Recommendations and Planned Interventions: self care training, functional mobility training, therapeutic exercise, balance training, therapeutic activities, endurance activities, patient education and home safety training    Frequency/Duration: Patient will be followed by occupational therapy 3 times a week to address goals. Recommendation for discharge: (in order for the patient to meet his/her long term goals)  Therapy up to 5 days/week in SNF setting    This discharge recommendation:  Has not yet been discussed the attending provider and/or case management    IF patient discharges home will need the following DME: pt owns necessary DME       SUBJECTIVE:   Patient stated This is uncomfortable.  (referring to sitting EOB)    OBJECTIVE DATA SUMMARY:   HISTORY:   Past Medical History:   Diagnosis Date    Anxiety disorder     Atrial fibrillation ([facility]     CAD (coronary artery disease) 2007    stents, CABG x 3v    Carotid stenosis     Cervical stenosis of spinal canal 07/2019    Chronic kidney disease     Cough     CVA (cerebral vascular accident) ([address] 07/2019    left lacunar infarct at head of caudate    Depression     AND CHRONIC ANXIETY    Diabetes (HCC)     GERD (gastroesophageal reflux disease)     High cholesterol     History of peptic ulcer     Bleeding ulcer with increased NSAID use    Hypertension     Left carotid stenosis 07/2019    s/p left CEA with Dr. Abimbola Topete, old 2007  PUD (peptic ulcer disease)     Stroke ([facility]     Tremor     Valvular heart disease      Past Surgical History:   Procedure Laterality Date    COLONOSCOPY N/A 12/17/2021    COLONOSCOPY   :- performed by Alexus [facility] at [facility] ENDOSCOPY    HX CAROTID ENDARTERECTOMY  07/2019    HX CORONARY ARTERY BYPASS GRAFT      HX TONSILLECTOMY  1963    CO CARDIAC SURG PROCEDURE UNLIST      CABG X3 VESSEL    CO TOTAL KNEE ARTHROPLASTY Right 2015       Expanded or extensive additional review of patient history:     Home Situation  Home Environment: [address] [place] [address] Name: Adarsh Baxter  # Steps to Enter: 0  One/Two Story Residence: One story  Living Alone: No  Support Systems: Skilled Nursing Facility,Other Family Member(s) (niece)  Patient Expects to be Discharged to[de-identified] Skilled nursing facility  Current DME Used/Available at Home: per [employer],Wheelchair      EXAMINATION OF PERFORMANCE DEFICITS:  Cognitive/Behavioral Status:  Neurologic State: Alert  Orientation Level: Oriented X4  Cognition: Follows commands  Perception: Appears intact  Perseveration: No perseveration noted  Safety/Judgement: Awareness of environment    Skin: visually intact    Edema: none noted     Hearing: Auditory  Auditory Impairment: Hard of hearing, bilateral    Vision/Perceptual:                           Acuity: Within Defined Limits         Range of Motion:    AROM: Generally decreased, functional                         Strength:    Strength: Generally decreased, functional                Coordination:  Coordination: Generally decreased, functional  Fine Motor Skills-Upper: Left Intact; Right Intact    Gross Motor Skills-Upper: Left Intact; Right Intact    Tone & Sensation:    Tone: Normal  Sensation: Intact                      Balance:  Sitting: Impaired  Sitting - Static: Fair (occasional)  Sitting - Dynamic: Fair (occasional)  Standing:  (not tested )    Functional Mobility and Transfers for ADLs:  Bed Mobility:  Supine to Sit: Minimum assistance;Assist x2; Additional time; Adaptive equipment  Sit to Supine: Minimum assistance;Assist x2; Additional time; Adaptive equipment  Scooting: Minimum assistance;Assist x1    Transfers:       ADL Assessment:  Feeding: Setup (inferred)    Oral Facial Hygiene/Grooming: Setup (seated, inferred)    Bathing: Moderate assistance (inferred, for LB reach and to maintain sitting balance)    Upper Body Dressing: Setup;Minimum assistance (inferred)    Lower Body Dressing: Moderate assistance;Maximum assistance (inferred, seated, for imapired LB reach and balance)    Toileting: Minimum assistance; Moderate assistance (inferred)                ADL Intervention and task modifications:                           Lower Body Dressing Assistance  Socks: Total assistance (dependent)  Position Performed: Long sitting on bed         Cognitive Retraining  Safety/Judgement: Awareness of environment   Functional Measure:    Barthel Index:  Bathin  Bladder: 5  Bowels: 10  Groomin  Dressin  Feedin  Mobility: 0  Stairs: 0  Toilet Use: 5  Transfer (Bed to Chair and Back): 5  Total: 40/100      The Barthel ADL Index: Guidelines  1. The index should be used as a record of what a patient does, not as a record of what a patient could do. 2. The main aim is to establish degree of independence from any help, physical or verbal, however minor and for whatever reason. 3. The need for supervision renders the patient not independent. 4. A patient's performance should be established using the best available evidence. Asking the patient, friends/relatives and nurses are the usual sources, but direct observation and common sense are also important. However direct testing is not needed. 5. Usually the patient's performance over the preceding 24-48 hours is important, but occasionally longer periods will be relevant.   6. Middle categories imply that the patient supplies over 50 per cent of the effort. 7. Use of aids to be independent is allowed. Score Interpretation (from [address])    Independent   60-79 Minimally independent   40-59 Partially dependent   20-39 Very dependent   <20 Totally dependent     -Ramesh Pace., Barthel, D.W. (1965). Functional evaluation: the Barthel Index. [address] ([address] (1997). The Barthel activities of daily living index: self-reporting versus actual performance in the old (> or = 75 years). Journal [address](7), 14 [facility], J.DEE DEEF, Lemuel Reddy., Vishal Gaines. (1999). Measuring the change in disability after inpatient rehabilitation; comparison of the responsiveness of the Barthel Index and Functional [place] Measure. Journal of Neurology, Neurosurgery, and Psychiatry, 66(4), 207-043. Hubert Galvan NEDELMIRA.ELAINE, APARNA Ventura, & Slade Edge, MSELENA. (2004) Assessment of post-stroke quality of life in cost-effectiveness studies: The usefulness of the Barthel Index and the EuroQoL-5D.  Quality of Life Research, 15, 129-99     Occupational Therapy Evaluation Charge Determination   History Examination Decision-Making   LOW Complexity : Brief history review  LOW Complexity : 1-3 performance deficits relating to physical, cognitive , or psychosocial skils that result in activity limitations and / or participation restrictions  LOW Complexity : No comorbidities that affect functional and no verbal or physical assistance needed to complete eval tasks       Based on the above components, the patient evaluation is determined to be of the following complexity level: LOW   Pain Rating:  None     Activity Tolerance:   Fair    After treatment patient left in no apparent distress:    Supine in bed, Heels elevated for pressure relief, Call bell within reach and Side rails x 3, RN present     COMMUNICATION/EDUCATION:   The patients plan of care was discussed with: Physical therapist and Registered nurse. Patient/family agree to work toward stated goals and plan of care. This patients plan of care is appropriate for delegation to BEL.     Thank you for this referral.  Michelle Castillo OT  Time Calculation: 15 mins

## 2022-01-10 NOTE — NURSE NAVIGATOR
Chart reviewed by Heart Failure Nurse Navigator. Heart Failure database completed. EF:  Previous echo shows EF 40/45%; repeat echo pending    ACEi/ARB/ARNi:     BB: toprol xl 100 mg daily    Aldosterone Antagonist: not currently indicated    Obstructive Sleep Apnea Screening: screening priority 4, advanced age   STOP-BANG score:   Referred to Sleep Medicine:     CRT not currently indicated. NYHA Functional Class documentation requested. Heart Failure Teach Back in Patient Education. Heart Failure Avoiding Triggers on Discharge Instructions. Cardiologist: Dr. Makenzie Dang ([facility])      Post discharge follow up phone call to be made within 48-72 hours of discharge. 27 Day HF Readmission:    Prior admission 12/30/21 - 1/6/22  DX:Hypertensive heart and chronic kidney disease with heart failure and stage 1 through stage 4 chronic kidney disease, or unspecified chronic kidney disease    Discharging Unit:  [address]             Discharging MD:  Dr. Bull Alvarez    Patient was discharged to [address] [place] [address] on 1/6/22. On 1/7/22, patient was found to be tachypneic with O2 sats 80% on 4 L O2, so was sent to [facility] ED.  NT pro-BNP > 20,000 in ED (NT pro-BNP 13,944 on 1/3/22). Chest xray shows pulmonary edema. Pt also had [facility] hospitalization 12/10/21 - 12/21/21. DX: Pneumonia.

## 2022-01-10 NOTE — PROGRESS NOTES
Reason for Readmission:  Shortness of breath          RUR Score/Risk Level:   30%      PCP: First and Last name:  Dr. Lucio Benoit     Name of Practice: [facility]     Are you a current patient: Yes/No: Yes   Approximate date of last visit: 11/21   Can you participate in a virtual visit with your PCP: n/a    Is a Care Conference indicated:  N/a       Did you attend your follow up appointment (s): If not, why not: No         Resources/supports as identified by patient/family:  Michelle Hinojosa [phone]        Top Challenges facing patient (as identified by patient/family and CM): Finances/Medication cost?     [facility]  Support system or lack thereof? Niece and other family members     Living arrangements? Lives with niece         Self-care/ADLs/Cognition? A&O x 4, was independent with ADLs and self care prior to previous hospital admission        Current Advanced Directive/Advance Care Plan:  DNR, ACP on file             Plan for utilizing home health:   N/a             Transition of Care Plan:    Based on readmission, the patient's previous Plan of Care   has been evaluated and/or modified. The current Transition of Care Plan is:      CM met with patient to inform of CM role and assess needs. Patient is in agreement with returning to [facility]. Verified address, emergency contact, PCP. Patients uses wheelchair and cane at home for mobility. CM to monitor for any needs. Care Management Interventions  PCP Verified by CM: Yes  Last Visit to PCP: 11/15/21  Mode of Transport at Discharge:  Other (see comment) (Kamilah Martinez)  Transition of Care Consult (CM Consult): SNF  Physical Therapy Consult: No  Occupational Therapy Consult: No  Speech Therapy Consult: No  Support Systems: Other Family Member(s)  Confirm Follow Up Transport: Family  [employer] Information Provided?: No  Discharge Location  Discharge Placement: Skilled nursing facility     Readmission Assessment  Number of days since last admission?: 1-7 days  Previous disposition: SNF  Who is being interviewed?: Patient  What was the patient's/caregiver's perception as to why they think they needed to return back to the hospital?: Other (Comment) (shortness of breath)  Did you visit your Primary Care Physician after you left the hospital, before you returned this time?: No  Why weren't you able to visit your PCP?: Did not have an appointment  Did you see a specialist, such as Cardiac, Pulmonary, Orthopedic Physician, etc. after you left the hospital?: No  Who advised the patient to return to the hospital?: Skilled Unit  Does the patient report anything that got in the way of taking their medications?: No    Solomon Bob RN

## 2022-01-10 NOTE — PROGRESS NOTES
Bedside shift change report given to Leila Bailey rn (oncoming nurse) by Lucien Mccarthy (offgoing nurse). Report included the following information SBAR, Kardex, Intake/Output, MAR, Recent Results and Cardiac Rhythm afib .

## 2022-01-10 NOTE — PROGRESS NOTES
[facility] Adult  Hospitalist Group                                                                                          Hospitalist Progress Note  Gloria Bolton MD  Answering service: [phone] from in house phone        Date of Service:  1/10/2022  NAME:  Mina Ware  :  1940  MRN:  [id number]      Admission Summary:   Mina Ware is a [de-identified] y.o. female with hx of afib on eliquis, htn, ckd iv, cad s/p cabg, cva, mdd w/ jas, gerd, parkinson who presents to hospital with complaints of sob. Patient was recently just discharged from hospital after admission for acute and chronic respiratory failure with hypoxia deemed secondary to CHF. Patient was discharged on 2 L of oxygen. She is seen and examined at bedside. States she has felt persistently short of breath over the last 24 hours. She denies any fever, chills, chest or abdominal pain, nausea, vomiting, cough, congestion, recent illness, palpitations, or dysuria. Per chart review, patient had been out of her baseline 2 L oxygen for an unknown amount of time. She was found hypoxic in low 80s and placed on 4 L nasal cannula. Patient was persistently hypoxic and tachypneic despite this and was therefore transferred to hospital for additional evaluation. Interval history / Subjective:   Patient seen and examined for follow-up of shortness of breath, CHF exacerbation, and atrial fibrillation. Patient seen and examined earlier today by me. Patient looks much more comfortable than yesterday. No acute shortness of breath and no acute complaints.      Assessment & Plan:     Shortness of breath secondary to acute CHF and exacerbation  Patient has diastolic CHF with previous hospitalization and now rehospitalization for continued shortness of breath  Cardiology on board  Patient is diuresing on Bumex 1 mg IV twice daily we will continue  Daily weights  Strict I's and O's  Limited echo shows EF of 40 to 45% with left ventricular size normal and mild global hypokinesis. [place] ordered by Cardiology. Elevated troponin   not significant    Atrial fibrillation  Continue metoprolol and Cardizem   Rate controlled    Hypertension  On hydralazine, diltiazem, metoprolol  Patient has been hypotensive so we have held her Bumex this afternoon. GERD  Protonix    Parkinson's dementia/major depression with disorder/generalized anxiety  Carbidopa-levodopa    CKD stage IV  Kidney function improving with diuresis    Holding Eliquis as patient's hemoglobin has dropped. Code status: DNR  DVT prophylaxis: eliquis    Care Plan discussed with: Patient/Family and Nurse  Anticipated Disposition: SNF/LTC and SAH/Rehab  Anticipated Discharge: 24 hours to 48 hours     Hospital Problems  Date Reviewed: 1/6/2022          Codes Class Noted POA    CHF (congestive heart failure) ([facility] ICD-10-CM: I50.9  ICD-9-CM: 428.0  1/8/2022 Unknown                Review of Systems:   A comprehensive review of systems was negative except for that written in the HPI. Vital Signs:    Last 24hrs VS reviewed since prior progress note. Most recent are:  Visit Vitals  BP 97/60 (BP 1 Location: Right upper arm, BP Patient Position: At rest)   Pulse 76   Temp 97.1 °F (36.2 °C)   Resp 19   Ht 5' 5\" (1.651 m)   Wt 73.4 kg (161 lb 13.1 oz)   SpO2 100%   BMI 26.93 kg/m²         Intake/Output Summary (Last 24 hours) at 1/10/2022 1812  Last data filed at 1/9/2022 2240  Gross per 24 hour   Intake 358 ml   Output 225 ml   Net 133 ml        Physical Examination:     I had a face to face encounter with this patient and independently examined them on 1/10/2022 as outlined below:          Constitutional:  No acute distress, cooperative   ENT:  Oral mucosa moist, oropharynx benign. Resp:  CTA bilaterally but with decreased breath sounds bilaterally. No wheezing/rhonchi/rales.  No accessory muscle use   CV:  Irregular rhythm, normal rate, no murmurs, gallops, rubs GI:  Soft, non distended, non tender. normoactive bowel sounds, no hepatosplenomegaly     Musculoskeletal:  No edema, warm, 2+ pulses throughout    Neurologic:  Moves all extremities. AAOx3, CN II-XII reviewed            Data Review:    Review and/or order of clinical lab test  Review and/or order of tests in the radiology section of [facility]  Review and/or order of tests in the medicine section of [facility]      Labs:     Recent Labs     01/10/22  0234 01/09/22  0441   WBC 5.8 5.9   HGB 7.4* 8.0*   HCT 23.9* 25.3*    252     Recent Labs     01/10/22  0234 01/09/22  0441 01/08/22  0241   * 137 135*   K 4.1 3.9 4.4    105 103   CO2 27 28 25   BUN 53* 46* 48*   CREA 2.00* 1.95* 2.12*   * 124* 119*   CA 8.5 8.8 9.4   MG  --   --  2.4     Recent Labs     01/07/22 2031   ALT 15      TBILI 0.5   TP 6.8   ALB 3.6   GLOB 3.2     No results for input(s): INR, PTP, APTT, INREXT, INREXT in the last 72 hours. No results for input(s): FE, TIBC, PSAT, FERR in the last 72 hours. Lab Results   Component Value Date/Time    Folate 7.8 12/14/2021 04:40 AM      No results for input(s): PH, PCO2, PO2 in the last 72 hours. No results for input(s): CPK, CKNDX, TROIQ in the last 72 hours.     No lab exists for component: CPKMB  Lab Results   Component Value Date/Time    Cholesterol, total 148 09/23/2020 04:27 AM    HDL Cholesterol 52 09/23/2020 04:27 AM    LDL, calculated 83.8 09/23/2020 04:27 AM    Triglyceride 61 09/23/2020 04:27 AM    CHOL/HDL Ratio 2.8 09/23/2020 04:27 AM     Lab Results   Component Value Date/Time    Glucose (POC) 108 12/21/2021 06:15 AM    Glucose (POC) 148 (H) 12/20/2021 09:54 PM    Glucose (POC) 156 (H) 12/18/2021 11:53 AM    Glucose (POC) 96 12/18/2021 10:18 AM    Glucose (POC) 120 (H) 04/24/2021 04:54 PM     Lab Results   Component Value Date/Time    Color YELLOW/STRAW 12/30/2021 08:23 PM    Appearance CLOUDY (A) 12/30/2021 08:23 PM    Specific gravity 1.017 12/30/2021 08:23 PM    pH (UA) 6.0 12/30/2021 08:23 PM    Protein 100 (A) 12/30/2021 08:23 PM    Glucose Negative 12/30/2021 08:23 PM    Ketone Negative 12/30/2021 08:23 PM    Bilirubin Negative 12/30/2021 08:23 PM    Urobilinogen 1.0 12/30/2021 08:23 PM    Nitrites Negative 12/30/2021 08:23 PM    Leukocyte Esterase MODERATE (A) 12/30/2021 08:23 PM    Epithelial cells FEW 12/30/2021 08:23 PM    Bacteria 1+ (A) 12/30/2021 08:23 PM    WBC 0-4 12/30/2021 08:23 PM    RBC 0-5 12/30/2021 08:23 PM         Medications Reviewed:     Current Facility-Administered Medications   Medication Dose Route Frequency    apixaban (ELIQUIS) tablet 2.5 mg  2.5 mg Oral BID    ARIPiprazole (ABILIFY) tablet 20 mg  20 mg Oral DAILY    atorvastatin (LIPITOR) tablet 40 mg  40 mg Oral QHS    busPIRone (BUSPAR) tablet 5 mg  5 mg Oral TID    carbidopa-levodopa (SINEMET)  mg per tablet 2 Tablet  2 Tablet Oral QID    cilostazoL (PLETAL) tablet 100 mg  100 mg Oral ACB&D    clopidogreL (PLAVIX) tablet 75 mg  75 mg Oral DAILY AFTER BREAKFAST    dilTIAZem ER (CARDIZEM CD) capsule 120 mg  120 mg Oral DAILY    DULoxetine (CYMBALTA) capsule 120 mg  120 mg Oral DAILY    gabapentin (NEURONTIN) capsule 100 mg  100 mg Oral QHS    hydrALAZINE (APRESOLINE) tablet 50 mg  50 mg Oral TID    melatonin tablet 6 mg  6 mg Oral QHS    pantoprazole (PROTONIX) tablet 40 mg  40 mg Oral ACB    sodium chloride (NS) flush 5-40 mL  5-40 mL IntraVENous Q8H    sodium chloride (NS) flush 5-40 mL  5-40 mL IntraVENous PRN    acetaminophen (TYLENOL) tablet 650 mg  650 mg Oral Q6H PRN    Or    acetaminophen (TYLENOL) suppository 650 mg  650 mg Rectal Q6H PRN    polyethylene glycol (MIRALAX) packet 17 g  17 g Oral DAILY PRN    ondansetron (ZOFRAN ODT) tablet 4 mg  4 mg Oral Q8H PRN    Or    ondansetron (ZOFRAN) injection 4 mg  4 mg IntraVENous Q6H PRN    metoprolol tartrate (LOPRESSOR) tablet 50 mg  50 mg Oral Q12H    bumetanide (BUMEX) injection 1 mg  1 mg IntraVENous Q12H ______________________________________________________________________  EXPECTED LENGTH OF STAY: 3d 19h  ACTUAL LENGTH OF STAY:          [id number] [address] Narayanan MD

## 2022-01-10 NOTE — PROGRESS NOTES
[address] [place] [address]               Division of Cardiology  [phone]                       Progress note              Barbara Franco M.D., F.A.CRennyC. NAME:  Juan David Zaidi   :   1940   MRN:   [id number]         ICD-10-CM ICD-9-CM    1. Acute on chronic respiratory failure with hypoxia ([facility])  J96.21 518.84      799.02    2. Acute on chronic congestive heart failure, unspecified heart failure type ([facility])  I50.9 428.0                  HPI  [de-identified]years old female with past medical history remarkable for hypertension, atrial fibrillation, coronary disease status post CABG. Status post PCI of native LAD in 2021. She does have severe three-vessel disease with a patent LIMA to the LAD and a patent graft sequential to the first diagonal obtuse marginal branch.     Also history of diastolic dysfunction and fluctuating ejection fraction.     Recently discharged from hospital following admission for shortness of breath she presents again with occurrence of shortness of breath.     To be noted the patient was previously considered for resynchronization therapy but it was felt that ejection fraction had improved and left bundle branch block was not as wide as before.     To be noted that she has undergone a renal angiogram in the past which demonstrated moderate renal arterial disease.         Assessment/Plan: 1. Cardiomyopathy: Her latest echocardiac reveals actually a reduction in ejection fraction of 40 to 45% of unclear etiology at this time. Continue IV diuresis and strict I's and O's. For now continue metoprolol and hydralazine but consider changing metoprolol to Toprol. Her EKG shows an intraventricular conduction delay at 138 ms in atrial fibrillation with rapid ventricular response upon admission. CRT could be a consideration in the future.   The reduction in ejection fraction is of unclear etiology at this time. Given her previous history of coronary disease ischemic evaluation will be done while here in the hospital since is the second time she is being admitted for shortness of breath and pulmonary edema on chest x-ray. Follow-up proBNP. 2.  CAD: Her troponin was essentially normal.  Once again EKG showed atrial fibrillation with left bundle branch block. Proceed with stress test.    Continue Plavix continue statin and continue beta-blocker for now. 3.  Atrial fibrillation: Heart rate seems to be controlled. Continue combination of Cardizem and metoprolol. Continue Eliquis no untoward effects thus far. 4.  CKD: Stable defer treatment to primary team.           CARDIAC STUDIES      01/07/22    ECHO ADULT FOLLOW-UP OR LIMITED 01/09/2022 1/9/2022    Interpretation Summary    Left Ventricle: Left ventricle size is normal. Mildly increased wall thickness. Mild global hypokinesis present. Mildly reduced left ventricular systolic function with a visually estimated EF of 40 - 45%.   Mitral Valve: Moderate posterior mitral annular calcification.   Pericardium: Left pleural effusion.   Contrast used: Definity. Signed by: Trupti Cadena MD on 1/9/2022  2:52 PM      04/30/20    NUCLEAR CARDIAC STRESS TEST 05/05/2020, 05/05/2020 5/5/2020    Interpretation Summary  · Baseline ECG: Normal sinus rhythm, non-specific ST-T wave abnormalities. · NM: No ischemia. Possible small distal anterior infarct. LVEF 53%. Rest and Lexiscan myocardial perfusion SPECT imaging is performed with IV injections of 10.6 and 32.9 mCi technetium 99m Sestamibi respectively. SPECT images displayed in three planes show no ischemic reversibility. There is a small focus of distal anterior wall fixed thinning, possible infarct. Gated SPECT images reviewed in 3 planes show unremarkable LV systolic wall thickening. There is no segmental wall motion abnormality of the left ventricular myocardium.  The calculated LV ejection fraction is 53%. Pulmonary uptake and left ventricular cavity size appear normal.    Impression  : No ischemia. Possible small distal anterior infarct. LVEF 53%. Signed by: Luis Alberto Alfredo MD on 5/5/2020 10:26 AM, Signed by: Sara Samson MD on 5/5/2020 12:11 PM      03/29/21    INVASIVE VASCULAR PROCEDURE 04/05/2021 4/5/2021  CARDIAC PROCEDURE 04/05/2021 4/5/2021    Conclusion  Results: After angiography 60 to 70% stenosis of ostium of right renal artery was noted. On IVUS imaging the minimal luminal dimensions were 4 x 2.1 mm with MLA of 8 mm². Left renal angiogram demonstrated 50 to 60% angiographic stenosis which on IVUS appeared to be even less prominent with minimal luminal dimensions of 4.2 x 3.7 mm. Conclusion: Moderate to severe left renal artery stenosis and mild to moderate right renal arterial stenosis. Both stenotic lesions are ostial and atherosclerotic in etiology. Signed by: Olinda Armendariz MD on 4/5/2021 10:00 AM       @[email or web address]      IMAGING      CT Results (most recent):  Results from Hospital Encounter encounter on 10/29/21    CT ABD PELV WO CONT    Narrative  EXAM: CT ABD PELV WO CONT    INDICATION: abd pain and tenderness, fevers    COMPARISON: 4/23/2021    CONTRAST:  None. TECHNIQUE:  Thin axial images were obtained through the abdomen and pelvis. Coronal and  sagittal reformats were generated. Oral contrast was not administered. CT dose  reduction was achieved through use of a standardized protocol tailored for this  examination and automatic exposure control for dose modulation. The absence of intravenous contrast material reduces the sensitivity for  evaluation of the vasculature and solid organs. FINDINGS:  LOWER THORAX: No significant abnormality in the incidentally imaged lower chest.  LIVER: No mass. BILIARY TREE: Gallstones are noted. CBD is not dilated. SPLEEN: within normal limits. PANCREAS: No focal abnormality.   ADRENALS: Unremarkable. KIDNEYS/URETERS: Unchanged bilateral renal cysts, no follow-up required. No  renal or ureteral stone or evidence of hydronephrosis. STOMACH: Unremarkable. SMALL BOWEL: No dilatation or wall thickening. COLON: No dilatation or wall thickening. APPENDIX: Not visualized. PERITONEUM: No ascites or pneumoperitoneum. RETROPERITONEUM: Unchanged 4.3 cm infrarenal abdominal aortic aneurysm. REPRODUCTIVE ORGANS: There is no pelvic mass or lymphadenopathy. URINARY BLADDER: No mass or calculus. BONES: Degenerative changes are seen in the lumbar spine. ABDOMINAL WALL: No mass or hernia. ADDITIONAL COMMENTS: N/A    Impression  Cholelithiasis. No acute abnormality. XR Results (most recent):  Results from Hospital Encounter encounter on 01/07/22    XR CHEST PORT    Narrative  Indication: Respiratory failure    Comparison: 1/2/2022    Portable exam of the chest obtained at 2105 demonstrates normal heart size. The  patient is status post median sternotomy. Diffuse interstitial infiltrates are  noted. There are small bilateral pleural effusions. Chronic rotator cuff tear is  noted on the left.     Impression  Small bilateral pleural effusions and diffuse bilateral interstitial  infiltrates most likely related to pulmonary edema       MRI Results (most recent):          Past Medical History:   Diagnosis Date    Anxiety disorder     Atrial fibrillation (HCC)     CAD (coronary artery disease) 2007    stents, CABG x 3v    Carotid stenosis     Cervical stenosis of spinal canal 07/2019    Chronic kidney disease     Cough     CVA (cerebral vascular accident) [address] 07/2019    left lacunar infarct at head of caudate    Depression     AND CHRONIC ANXIETY    Diabetes ([address]     GERD (gastroesophageal reflux disease)     High cholesterol     History of peptic ulcer     Bleeding ulcer with increased NSAID use    Hypertension     Left carotid stenosis 07/2019    s/p left CEA with Dr. Lili Mckenna MI, old 2007    PUD (peptic ulcer disease)     [facility]     Tremor     Valvular heart disease            Past Surgical History:   Procedure Laterality Date    COLONOSCOPY N/A 12/17/2021    COLONOSCOPY   :- performed by Bebo Madrid MD at [facility] ENDOSCOPY    HX CAROTID ENDARTERECTOMY  07/2019    HX CORONARY ARTERY BYPASS GRAFT      HX TONSILLECTOMY  1963    NC CARDIAC SURG PROCEDURE UNLIST      CABG X3 VESSEL    NC TOTAL KNEE ARTHROPLASTY Right 2015               Visit Vitals  BP (!) 83/49   Pulse 72   Temp 98 °F (36.7 °C)   Resp 17   Ht 5' 5\" (1.651 m)   Wt 161 lb 13.1 oz (73.4 kg)   SpO2 98%   BMI 26.93 kg/m²         Wt Readings from Last 3 Encounters:   01/10/22 161 lb 13.1 oz (73.4 kg)   01/06/22 164 lb 3.9 oz (74.5 kg)   12/18/21 173 lb 8 oz (78.7 kg)         Review of Systems:   Pertinent items are noted in the History of Present Illness. No intake/output data recorded. 01/08 1901 - 01/10 0700  In: 358 [P.O.:358]  Out: 725 [Urine:725]      Telemetry: normal sinus rhythm    Physical Exam:    Neck: no JVD  Heart: irregularly irregular rhythm  Lungs: diminished breath sounds R base, L base  Abdomen: soft, non-tender.  Bowel sounds normal. No masses,  no organomegaly  Extremities: no edema    Current Facility-Administered Medications   Medication Dose Route Frequency    apixaban (ELIQUIS) tablet 2.5 mg  2.5 mg Oral BID    ARIPiprazole (ABILIFY) tablet 20 mg  20 mg Oral DAILY    atorvastatin (LIPITOR) tablet 40 mg  40 mg Oral QHS    busPIRone (BUSPAR) tablet 5 mg  5 mg Oral TID    carbidopa-levodopa (SINEMET)  mg per tablet 2 Tablet  2 Tablet Oral QID    cilostazoL (PLETAL) tablet 100 mg  100 mg Oral ACB&D    clopidogreL (PLAVIX) tablet 75 mg  75 mg Oral DAILY AFTER BREAKFAST    dilTIAZem ER (CARDIZEM CD) capsule 120 mg  120 mg Oral DAILY    DULoxetine (CYMBALTA) capsule 120 mg  120 mg Oral DAILY    gabapentin (NEURONTIN) capsule 100 mg  100 mg Oral QHS    hydrALAZINE (APRESOLINE) tablet 50 mg  50 mg Oral TID    melatonin tablet 6 mg  6 mg Oral QHS    pantoprazole (PROTONIX) tablet 40 mg  40 mg Oral ACB    sodium chloride (NS) flush 5-40 mL  5-40 mL IntraVENous Q8H    sodium chloride (NS) flush 5-40 mL  5-40 mL IntraVENous PRN    acetaminophen (TYLENOL) tablet 650 mg  650 mg Oral Q6H PRN    Or    acetaminophen (TYLENOL) suppository 650 mg  650 mg Rectal Q6H PRN    polyethylene glycol (MIRALAX) packet 17 g  17 g Oral DAILY PRN    ondansetron (ZOFRAN ODT) tablet 4 mg  4 mg Oral Q8H PRN    Or    ondansetron (ZOFRAN) injection 4 mg  4 mg IntraVENous Q6H PRN    metoprolol tartrate (LOPRESSOR) tablet 50 mg  50 mg Oral Q12H    bumetanide (BUMEX) injection 1 mg  1 mg IntraVENous Q12H         All Cardiac Markers in the last 24 hours:  No results found for: CPK, CK, CKMMB, CKMB, RCK3, CKMBT, CKMBPOC, CKNDX, CKND1, MONSTER, TROPT, TROIQ, ANABELLE, TROPT, TNIPOC, BNP, BNPP, BNPNT     Lab Results   Component Value Date/Time    Creatinine (POC) 1.6 (H) 02/24/2020 10:32 AM    Creatinine 2.00 (H) 01/10/2022 02:34 AM          Lab Results   Component Value Date/Time    CK 75 07/11/2019 09:35 AM    CK - MB 2.7 07/11/2019 09:35 AM    CK-MB Index 3.6 (H) 07/11/2019 09:35 AM    Troponin-I, Qt. 0.05 (H) 04/24/2021 04:54 AM     (H) 06/08/2014 04:12 AM         Lab Results   Component Value Date/Time    WBC 5.8 01/10/2022 02:34 AM    HGB 7.4 (L) 01/10/2022 02:34 AM    HCT 23.9 (L) 01/10/2022 02:34 AM    PLATELET 263 61/25/4163 02:34 AM    .3 (H) 01/10/2022 02:34 AM         Lab Results   Component Value Date/Time    Sodium 135 (L) 01/10/2022 02:34 AM    Potassium 4.1 01/10/2022 02:34 AM    Chloride 103 01/10/2022 02:34 AM    CO2 27 01/10/2022 02:34 AM    Anion gap 5 01/10/2022 02:34 AM    Glucose 125 (H) 01/10/2022 02:34 AM    BUN 53 (H) 01/10/2022 02:34 AM    Creatinine 2.00 (H) 01/10/2022 02:34 AM    BUN/Creatinine ratio 27 (H) 01/10/2022 02:34 AM    GFR est AA 29 (L) 01/10/2022 02:34 AM GFR est non-AA 24 (L) 01/10/2022 02:34 AM    Calcium 8.5 01/10/2022 02:34 AM         Lab Results   Component Value Date/Time     (H) 06/08/2014 04:12 AM     (H) 06/04/2014 04:44 AM    NT pro-BNP 20,345 (H) 01/08/2022 02:41 AM    NT pro-BNP 13,944 (H) 01/03/2022 03:44 AM    NT pro-BNP 13,027 (H) 01/01/2022 05:33 AM    NT pro-BNP 17,648 (H) 12/30/2021 08:45 AM    NT pro-BNP 8,184 (H) 12/19/2021 03:17 AM         Lab Results   Component Value Date/Time    Cholesterol, total 148 09/23/2020 04:27 AM    HDL Cholesterol 52 09/23/2020 04:27 AM    LDL, calculated 83.8 09/23/2020 04:27 AM    VLDL, calculated 12.2 09/23/2020 04:27 AM    Triglyceride 61 09/23/2020 04:27 AM    CHOL/HDL Ratio 2.8 09/23/2020 04:27 AM         Lab Results   Component Value Date/Time    ALT (SGPT) 15 01/07/2022 08:31 PM    Alk.  phosphatase 101 01/07/2022 08:31 PM    Bilirubin, total 0.5 01/07/2022 08:31 PM

## 2022-01-10 NOTE — PROGRESS NOTES
Problem: Mobility Impaired (Adult and Pediatric)  Goal: *Acute Goals and Plan of Care (Insert Text)  Description: Description: FUNCTIONAL STATUS PRIOR TO ADMISSION: The patient was functional at the wheelchair level and was modified independent for transfers to the wheelchair prior to recent admissions per pt report. Pt denies any falls in the last year. Pt reports she has not been OOB  when she was transferred back to [facility]. HOME SUPPORT PRIOR TO ADMISSION: The patient lived in [facility] due to her apartment lease ending in November 2021 and is looking for new living arrangements. Pt reports she has a niece in the area that assist her. Physical Therapy Goals  Initiated 1/2/2022  1. Patient will move from supine to sit and sit to supine  in bed with supervision within 7 day(s). 2.  Patient will transfer from bed to chair and chair to bed with moderate assistance using the least restrictive device within 7 day(s). 3.  Patient will perform sit to stand with moderate assistance within 7 day(s). Outcome: Progressing Towards Goal   PHYSICAL THERAPY EVALUATION  Patient: Cathy Epperson (6 y.o. female)  Date: 1/10/2022  Primary Diagnosis: CHF (congestive heart failure) ([facility]) [I50.9]        Precautions:   Fall,Contact      ASSESSMENT  Based on the objective data described below, the patient presents with decreased activity tolerance due to soft BP, low endurance, decreased strength throughout, decreased ROM with rigidity, history of Parkinson's disease, decreased balance and impaired functional mobility below her baseline level of function. Pt is known to therapist from previous admissions. Pt is alert and oriented however she is a limited historian and does not recall how she was getting around previously at [facility]. Pt reports she has not been OOB while there since her last admission. Pt cleared for mobility at this time and agreeable to work with therapy.   BP running low earlier today, 80s/40s and runs of VT per RN. BP at this time 97/60 and pt denies lightheadedness with positional changes. Pt required light assistance and extra time to transfer to EOB and scoot forward with CG assistance for balance. Pt sat briefly, fatigued quickly, and requested to return to bed. Current Level of Function Impacting Discharge (mobility/balance): bed mobility minimal assist x 2    Functional Outcome Measure: The patient scored Total Score: 1/28 on the Tinetti outcome measure which is indicative of high fall risk. Other factors to consider for discharge:  high fall risk, history of Parkinson's disease, below her stated baseline     Patient will benefit from skilled therapy intervention to address the above noted impairments. PLAN :  Recommendations and Planned Interventions: bed mobility training, transfer training, and patient and family training/education      Frequency/Duration: Patient will be followed by physical therapy:  3 times a week to address goals. Recommendation for discharge: (in order for the patient to meet his/her long term goals)  Therapy up to 5 days/week in SNF setting    This discharge recommendation:  A follow-up discussion with the attending provider and/or case management is planned    IF patient discharges home will need the following DME: patient owns DME required for discharge         SUBJECTIVE:   Patient stated Sarahi Durham is your name?     OBJECTIVE DATA SUMMARY:   HISTORY:    Past Medical History:   Diagnosis Date    Anxiety disorder     Atrial fibrillation (HCC)     CAD (coronary artery disease) 2007    stents, CABG x 3v    Carotid stenosis     Cervical stenosis of spinal canal 07/2019    Chronic kidney disease     Cough     CVA [facility]cerebral vascular [facility]) [facility] 07/2019    left lacunar infarct at head of caudate    Depression     AND CHRONIC ANXIETY    Diabetes ([facility]     GERD (gastroesophageal reflux disease)     High cholesterol     History of peptic ulcer Bleeding ulcer with increased NSAID use    Hypertension     Left carotid stenosis 07/2019    s/p left CEA with Dr. Loretta Renee, old 2007    PUD (peptic ulcer disease)     Stroke [facility])     Tremor     Valvular heart disease      Past Surgical History:   Procedure Laterality Date    COLONOSCOPY N/A 12/17/2021    COLONOSCOPY   :- performed by Edgard Montilla MD at [facility] ENDOSCOPY    HX CAROTID ENDARTERECTOMY  07/2019    HX CORONARY ARTERY BYPASS GRAFT      HX TONSILLECTOMY  1963    UT CARDIAC SURG PROCEDURE UNLIST      CABG X3 VESSEL    UT TOTAL KNEE ARTHROPLASTY Right 2015       Personal factors and/or comorbidities impacting plan of care:     Home Situation  Home Environment: [address] [place] [address] Name: [facility]  # Steps to Enter: 0  One/Two Story Residence: One story  Living Alone: No  Support Systems: Skilled Nursing Facility,Other Family Member(s) (niece)  Patient Expects to be Discharged to[de-identified] Skilled nursing facility  Current DME Used/Available at Home: [employer], rollator,Wheelchair    EXAMINATION/PRESENTATION/DECISION MAKING:   Critical Behavior:  Neurologic State: Alert  Orientation Level: Oriented X4  Cognition: Follows commands  Safety/Judgement: Awareness of environment  Hearing: Auditory  Auditory Impairment: Hard of hearing, bilateral  Skin:  intact    Range Of Motion:  AROM: Generally decreased, functional                       Strength:    Strength: Generally decreased, functional                    Tone & Sensation:   Tone: Normal              Sensation: Intact               Coordination:  Coordination: Generally decreased, functional  Vision:   Acuity: Within Defined Limits  Functional Mobility:  Bed Mobility:     Supine to Sit: Minimum assistance;Assist x2; Additional time; Adaptive equipment  Sit to Supine: Minimum assistance;Assist x2; Additional time; Adaptive equipment  Scooting: Minimum assistance;Assist x1                       Balance:   Sitting: Impaired  Sitting - Static: Fair (occasional)  Sitting - Dynamic: Fair (occasional)  Standing:  (not tested )                             Functional Measure:  Tinetti test:    Sitting Balance: 1  Arises: 0  Attempts to Rise: 0  Immediate Standing Balance: 0  Standing Balance: 0  Nudged: 0  Eyes Closed: 0  Turn 360 Degrees - Continuous/Discontinuous: 0  Turn 360 Degrees - Steady/Unsteady: 0  Sitting Down: 0  Balance Score: 1 Balance total score  Indication of Gait: 0  R Step Length/Height: 0  L Step Length/Height: 0  R Foot Clearance: 0  L Foot Clearance: 0  Step Symmetry: 0  Step Continuity: 0  Path: 0  Trunk: 0  Walking Time: 0  Gait Score: 0 Gait total score  Total Score: 1/28 Overall total score         Tinetti Tool Score Risk of Falls  <19 = High Fall Risk  19-24 = Moderate Fall Risk  25-28 = Low Fall Risk  Tinetti ME. Performance-Oriented Assessment of Mobility Problems in Elderly Patients. [facility] 66; [id number].  (Scoring Description: PT Bulletin Feb. 10, 1993)    Older adults: Doretha Martínez et al, 2009; n = 1000 [facility] elderly evaluated with ABC, KATJA, ADL, and IADL)  · Mean KATJA score for males aged 69-68 years = 26.21(3.40)  · Mean KATJA score for females age 69-68 years = 25.16(4.30)  · Mean KATJA score for males over 80 years = 23.29(6.02)  · Mean KATJA score for females over 80 years = 17.20(8.32)        Physical Therapy Evaluation Charge Determination   History Examination Presentation Decision-Making   HIGH Complexity :3+ comorbidities / personal factors will impact the outcome/ POC  MEDIUM Complexity : 3 Standardized tests and measures addressing body structure, function, activity limitation and / or participation in recreation  MEDIUM Complexity : Evolving with changing characteristics  MEDIUM Complexity : FOTO score of 26-74      Based on the above components, the patient evaluation is determined to be of the following complexity level: MEDIUM    Pain Rating:  Verbal: no complaints     Activity Tolerance:   Poor, fatigues quickly, BP low, no reports of lightheadedness       After treatment patient left in no apparent distress:   Supine in bed and Call bell within reach    COMMUNICATION/EDUCATION:   The patients plan of care was discussed with: Registered nurse. Fall prevention education was provided and the patient/caregiver indicated understanding. and Patient/family agree to work toward stated goals and plan of care.     Thank you for this referral.  Shadia Sharma   Time Calculation: 11 mins

## 2022-01-11 ENCOUNTER — APPOINTMENT (OUTPATIENT)
Dept: NON INVASIVE DIAGNOSTICS | Age: 82
DRG: 291 | End: 2022-01-11
Attending: SPECIALIST
Payer: MEDICARE

## 2022-01-11 ENCOUNTER — APPOINTMENT (OUTPATIENT)
Dept: NUCLEAR MEDICINE | Age: 82
DRG: 291 | End: 2022-01-11
Attending: SPECIALIST
Payer: MEDICARE

## 2022-01-11 LAB
ANION GAP SERPL CALC-SCNC: 8 MMOL/L (ref 5–15)
BASOPHILS # BLD: 0 K/UL (ref 0–0.1)
BASOPHILS NFR BLD: 1 % (ref 0–1)
BUN SERPL-MCNC: 55 MG/DL (ref 6–20)
BUN/CREAT SERPL: 25 (ref 12–20)
CALCIUM SERPL-MCNC: 8.5 MG/DL (ref 8.5–10.1)
CHLORIDE SERPL-SCNC: 100 MMOL/L (ref 97–108)
CO2 SERPL-SCNC: 24 MMOL/L (ref 21–32)
CREAT SERPL-MCNC: 2.16 MG/DL (ref 0.55–1.02)
DIFFERENTIAL METHOD BLD: ABNORMAL
EOSINOPHIL # BLD: 0.3 K/UL (ref 0–0.4)
EOSINOPHIL NFR BLD: 6 % (ref 0–7)
ERYTHROCYTE [DISTWIDTH] IN BLOOD BY AUTOMATED COUNT: 13.4 % (ref 11.5–14.5)
GLUCOSE SERPL-MCNC: 114 MG/DL (ref 65–100)
HCT VFR BLD AUTO: 21.4 % (ref 35–47)
HCT VFR BLD AUTO: 25.9 % (ref 35–47)
HGB BLD-MCNC: 6.7 G/DL (ref 11.5–16)
HGB BLD-MCNC: 8.4 G/DL (ref 11.5–16)
HISTORY CHECKED?,CKHIST: NORMAL
IMM GRANULOCYTES # BLD AUTO: 0 K/UL (ref 0–0.04)
IMM GRANULOCYTES NFR BLD AUTO: 0 % (ref 0–0.5)
LYMPHOCYTES # BLD: 0.6 K/UL (ref 0.8–3.5)
LYMPHOCYTES NFR BLD: 14 % (ref 12–49)
MCH RBC QN AUTO: 32.4 PG (ref 26–34)
MCHC RBC AUTO-ENTMCNC: 31.3 G/DL (ref 30–36.5)
MCV RBC AUTO: 103.4 FL (ref 80–99)
MONOCYTES # BLD: 0.5 K/UL (ref 0–1)
MONOCYTES NFR BLD: 10 % (ref 5–13)
NEUTS SEG # BLD: 3.1 K/UL (ref 1.8–8)
NEUTS SEG NFR BLD: 69 % (ref 32–75)
NRBC # BLD: 0 K/UL (ref 0–0.01)
NRBC BLD-RTO: 0 PER 100 WBC
PLATELET # BLD AUTO: 218 K/UL (ref 150–400)
PLATELET COMMENTS,PCOM: ABNORMAL
PMV BLD AUTO: 9.2 FL (ref 8.9–12.9)
POTASSIUM SERPL-SCNC: 4 MMOL/L (ref 3.5–5.1)
RBC # BLD AUTO: 2.07 M/UL (ref 3.8–5.2)
RBC MORPH BLD: ABNORMAL
RBC MORPH BLD: ABNORMAL
SODIUM SERPL-SCNC: 132 MMOL/L (ref 136–145)
STRESS BASELINE DIAS BP: 75 MMHG
STRESS BASELINE HR: 85 BPM
STRESS BASELINE SYS BP: 161 MMHG
STRESS ESTIMATED WORKLOAD: 1 METS
STRESS EXERCISE DUR MIN: NORMAL
STRESS PEAK DIAS BP: 75 MMHG
STRESS PEAK SYS BP: 161 MMHG
STRESS PERCENT HR ACHIEVED: 82 %
STRESS POST PEAK HR: 115 BPM
STRESS RATE PRESSURE PRODUCT: NORMAL BPM*MMHG
STRESS STAGE 1 BP: NORMAL MMHG
STRESS STAGE 1 DURATION: 1 MIN:SEC
STRESS STAGE 1 HR: 98 BPM
STRESS STAGE 2 DURATION: 1 MIN:SEC
STRESS STAGE 2 HR: 114 BPM
STRESS STAGE 3 DURATION: 1 MIN:SEC
STRESS STAGE 3 HR: 93 BPM
STRESS STAGE RECOVERY 1 BP: NORMAL MMHG
STRESS STAGE RECOVERY 1 DURATION: 1 MIN:SEC
STRESS STAGE RECOVERY 1 HR: 94 BPM
STRESS STAGE RECOVERY 2 DURATION: 1 MIN:SEC
STRESS STAGE RECOVERY 2 HR: 88 BPM
STRESS TARGET HR: 140 BPM
WBC # BLD AUTO: 4.5 K/UL (ref 3.6–11)

## 2022-01-11 PROCEDURE — 86900 BLOOD TYPING SEROLOGIC ABO: CPT

## 2022-01-11 PROCEDURE — 36415 COLL VENOUS BLD VENIPUNCTURE: CPT

## 2022-01-11 PROCEDURE — 74011250636 HC RX REV CODE- 250/636: Performed by: INTERNAL MEDICINE

## 2022-01-11 PROCEDURE — 86923 COMPATIBILITY TEST ELECTRIC: CPT

## 2022-01-11 PROCEDURE — 36430 TRANSFUSION BLD/BLD COMPNT: CPT

## 2022-01-11 PROCEDURE — P9047 ALBUMIN (HUMAN), 25%, 50ML: HCPCS | Performed by: INTERNAL MEDICINE

## 2022-01-11 PROCEDURE — A9500 TC99M SESTAMIBI: HCPCS | Performed by: INTERNAL MEDICINE

## 2022-01-11 PROCEDURE — 77010033678 HC OXYGEN DAILY

## 2022-01-11 PROCEDURE — 85025 COMPLETE CBC W/AUTO DIFF WBC: CPT

## 2022-01-11 PROCEDURE — 94760 N-INVAS EAR/PLS OXIMETRY 1: CPT

## 2022-01-11 PROCEDURE — 74011250637 HC RX REV CODE- 250/637: Performed by: STUDENT IN AN ORGANIZED HEALTH CARE EDUCATION/TRAINING PROGRAM

## 2022-01-11 PROCEDURE — 74011000250 HC RX REV CODE- 250: Performed by: STUDENT IN AN ORGANIZED HEALTH CARE EDUCATION/TRAINING PROGRAM

## 2022-01-11 PROCEDURE — P9016 RBC LEUKOCYTES REDUCED: HCPCS

## 2022-01-11 PROCEDURE — A9500 TC99M SESTAMIBI: HCPCS

## 2022-01-11 PROCEDURE — A9500 TC99M SESTAMIBI: HCPCS | Performed by: STUDENT IN AN ORGANIZED HEALTH CARE EDUCATION/TRAINING PROGRAM

## 2022-01-11 PROCEDURE — 74011250636 HC RX REV CODE- 250/636: Performed by: STUDENT IN AN ORGANIZED HEALTH CARE EDUCATION/TRAINING PROGRAM

## 2022-01-11 PROCEDURE — 74011250636 HC RX REV CODE- 250/636: Performed by: SPECIALIST

## 2022-01-11 PROCEDURE — 65660000000 HC RM CCU STEPDOWN

## 2022-01-11 PROCEDURE — 74011250637 HC RX REV CODE- 250/637: Performed by: SPECIALIST

## 2022-01-11 PROCEDURE — 78452 HT MUSCLE IMAGE SPECT MULT: CPT

## 2022-01-11 PROCEDURE — 99233 SBSQ HOSP IP/OBS HIGH 50: CPT | Performed by: SPECIALIST

## 2022-01-11 PROCEDURE — 80048 BASIC METABOLIC PNL TOTAL CA: CPT

## 2022-01-11 PROCEDURE — 85018 HEMOGLOBIN: CPT

## 2022-01-11 RX ORDER — SODIUM CHLORIDE 0.9 % (FLUSH) 0.9 %
20 SYRINGE (ML) INJECTION
Status: COMPLETED | OUTPATIENT
Start: 2022-01-11 | End: 2022-01-11

## 2022-01-11 RX ORDER — CARVEDILOL 12.5 MG/1
12.5 TABLET ORAL 2 TIMES DAILY WITH MEALS
Status: DISCONTINUED | OUTPATIENT
Start: 2022-01-11 | End: 2022-01-12

## 2022-01-11 RX ORDER — TETRAKIS(2-METHOXYISOBUTYLISOCYANIDE)COPPER(I) TETRAFLUOROBORATE 1 MG/ML
33 INJECTION, POWDER, LYOPHILIZED, FOR SOLUTION INTRAVENOUS
Status: COMPLETED | OUTPATIENT
Start: 2022-01-11 | End: 2022-01-11

## 2022-01-11 RX ORDER — TETRAKIS(2-METHOXYISOBUTYLISOCYANIDE)COPPER(I) TETRAFLUOROBORATE 1 MG/ML
11 INJECTION, POWDER, LYOPHILIZED, FOR SOLUTION INTRAVENOUS
Status: COMPLETED | OUTPATIENT
Start: 2022-01-11 | End: 2022-01-11

## 2022-01-11 RX ORDER — SODIUM CHLORIDE 9 MG/ML
250 INJECTION, SOLUTION INTRAVENOUS AS NEEDED
Status: DISCONTINUED | OUTPATIENT
Start: 2022-01-11 | End: 2022-01-14 | Stop reason: HOSPADM

## 2022-01-11 RX ORDER — DILTIAZEM HYDROCHLORIDE 120 MG/1
120 CAPSULE, COATED, EXTENDED RELEASE ORAL DAILY
Status: DISCONTINUED | OUTPATIENT
Start: 2022-01-12 | End: 2022-01-11

## 2022-01-11 RX ADMIN — BUSPIRONE HYDROCHLORIDE 5 MG: 5 TABLET ORAL at 21:53

## 2022-01-11 RX ADMIN — CILOSTAZOL 100 MG: 50 TABLET ORAL at 07:23

## 2022-01-11 RX ADMIN — HYDRALAZINE HYDROCHLORIDE 50 MG: 50 TABLET, FILM COATED ORAL at 21:53

## 2022-01-11 RX ADMIN — Medication 10 ML: at 12:16

## 2022-01-11 RX ADMIN — GABAPENTIN 100 MG: 100 CAPSULE ORAL at 21:54

## 2022-01-11 RX ADMIN — ATORVASTATIN CALCIUM 40 MG: 40 TABLET, FILM COATED ORAL at 21:54

## 2022-01-11 RX ADMIN — ALBUMIN (HUMAN) 25 G: 0.25 INJECTION, SOLUTION INTRAVENOUS at 00:09

## 2022-01-11 RX ADMIN — HYDRALAZINE HYDROCHLORIDE 50 MG: 50 TABLET, FILM COATED ORAL at 17:25

## 2022-01-11 RX ADMIN — BUSPIRONE HYDROCHLORIDE 5 MG: 5 TABLET ORAL at 17:26

## 2022-01-11 RX ADMIN — TECHNETIUM TC 99M SESTAMIBI 33 MILLICURIE: 1 INJECTION INTRAVENOUS at 09:35

## 2022-01-11 RX ADMIN — CILOSTAZOL 100 MG: 50 TABLET ORAL at 17:26

## 2022-01-11 RX ADMIN — CARBIDOPA AND LEVODOPA 2 TABLET: 25; 100 TABLET ORAL at 21:53

## 2022-01-11 RX ADMIN — PANTOPRAZOLE SODIUM 40 MG: 40 TABLET, DELAYED RELEASE ORAL at 07:23

## 2022-01-11 RX ADMIN — Medication 10 ML: at 13:10

## 2022-01-11 RX ADMIN — ARIPIPRAZOLE 20 MG: 10 TABLET ORAL at 12:07

## 2022-01-11 RX ADMIN — Medication 20 ML: at 09:46

## 2022-01-11 RX ADMIN — REGADENOSON 0.4 MG: 0.08 INJECTION, SOLUTION INTRAVENOUS at 09:36

## 2022-01-11 RX ADMIN — SODIUM CHLORIDE, PRESERVATIVE FREE 10 ML: 5 INJECTION INTRAVENOUS at 12:17

## 2022-01-11 RX ADMIN — DULOXETINE HYDROCHLORIDE 120 MG: 60 CAPSULE, DELAYED RELEASE ORAL at 12:07

## 2022-01-11 RX ADMIN — CARBIDOPA AND LEVODOPA 2 TABLET: 25; 100 TABLET ORAL at 17:26

## 2022-01-11 RX ADMIN — Medication 6 MG: at 21:52

## 2022-01-11 RX ADMIN — Medication 10 ML: at 21:54

## 2022-01-11 RX ADMIN — CLOPIDOGREL 75 MG: 75 TABLET, FILM COATED ORAL at 12:13

## 2022-01-11 RX ADMIN — CARBIDOPA AND LEVODOPA 2 TABLET: 25; 100 TABLET ORAL at 12:13

## 2022-01-11 RX ADMIN — CARVEDILOL 12.5 MG: 12.5 TABLET, FILM COATED ORAL at 17:26

## 2022-01-11 RX ADMIN — TECHNETIUM TC 99M SESTAMIBI 11 MILLICURIE: 1 INJECTION INTRAVENOUS at 07:05

## 2022-01-11 NOTE — PROGRESS NOTES
[facility] Adult  Hospitalist Group                                                                                          Hospitalist Progress Note  Shannon Clemente MD  Answering service: [phone] from in house phone        Date of Service:  2022  NAME:  Alvaro Chaudhari  :  1940  MRN:  [id number]      Admission Summary:   Alvaro Chaudhari is a [de-identified] y.o. female with hx of afib on eliquis, htn, ckd iv, cad s/p cabg, cva, mdd w/ jas, gerd, parkinson who presents to hospital with complaints of sob. Patient was recently just discharged from hospital after admission for acute and chronic respiratory failure with hypoxia deemed secondary to CHF. Patient was discharged on 2 L of oxygen. She is seen and examined at bedside. States she has felt persistently short of breath over the last 24 hours. She denies any fever, chills, chest or abdominal pain, nausea, vomiting, cough, congestion, recent illness, palpitations, or dysuria. Per chart review, patient had been out of her baseline 2 L oxygen for an unknown amount of time. She was found hypoxic in low 80s and placed on 4 L nasal cannula. Patient was persistently hypoxic and tachypneic despite this and was therefore transferred to hospital for additional evaluation. Interval history / Subjective:   Patient seen and examined for follow-up of shortness of breath, CHF exacerbation, and atrial fibrillation. Patient seen and examined earlier today by me. Patient looks much more comfortable than yesterday. No acute shortness of breath and no acute complaints.      Assessment & Plan:     Shortness of breath secondary to acute CHF and exacerbation  Patient has diastolic CHF with previous hospitalization and now rehospitalization for continued shortness of breath  Cardiology on board  Patient is diuresing on Bumex 1 mg IV twice daily we will continue  Daily weights  Strict I's and O's  Limited echo shows EF of 40 to 45% with left ventricular size normal and mild global hypokinesis. Lauryn Snider ordered by Cardiology. - no sob, cp on rounds 1/11/2022     Elevated troponin   not significant    Atrial fibrillation  Continue metoprolol and Cardizem   Rate controlled 1/10 - 1/11/2022     Hypertension  On hydralazine, diltiazem, metoprolol  Patient has been hypotensive so we have held her Bumex this afternoon. BP Readings from Last 1 Encounters:   01/11/22 106/63        GERD  Protonix    Parkinson's dementia/major depression with disorder/generalized anxiety  Carbidopa-levodopa    CKD stage IV  Kidney function improving with diuresis  Lab Results   Component Value Date/Time    Creatinine (POC) 1.6 (H) 02/24/2020 10:32 AM    Creatinine 2.16 (H) 01/11/2022 05:05 AM        Holding Eliquis as patient's hemoglobin has dropped. Code status: DNR  DVT prophylaxis: eliquis    Care Plan discussed with: Patient/Family and Nurse  Anticipated Disposition: SNF/LTC and SA[facility]/Rehab  Anticipated Discharge: 24 hours to 48 hours     Hospital Problems  Date Reviewed: 1/6/2022          Codes Class Noted POA    CHF (congestive heart failure) ([facility] [address] ICD-10-CM: I50.9  ICD-9-CM: 428.0  1/8/2022 Unknown                Review of Systems:   A comprehensive review of systems was negative except for that written in the HPI. Vital Signs:    Last 24hrs VS reviewed since prior progress note.  Most recent are:  Visit Vitals  /63 (BP 1 Location: Right upper arm, BP Patient Position: Semi fowlers)   Pulse 97   Temp 98.1 °F (36.7 °C)   Resp 19   Ht 5' 5\" (1.651 m)   Wt 73 kg (161 lb)   SpO2 96%   BMI 26.79 kg/m²         Intake/Output Summary (Last 24 hours) at 1/11/2022 1616  Last data filed at 1/11/2022 1520  Gross per 24 hour   Intake 1595.2 ml   Output 1000 ml   Net 595.2 ml        Physical Examination:     I had a face to face encounter with this patient and independently examined them on 1/11/2022 as outlined below:          Constitutional:  No acute distress, cooperative   ENT:  Oral mucosa moist, oropharynx benign. Resp:  CTA bilaterally but with decreased breath sounds bilaterally. No wheezing/rhonchi/rales. No accessory muscle use   CV:  Irregular rhythm, normal rate, no murmurs, gallops, rubs    GI:  Soft, non distended, non tender. normoactive bowel sounds, no hepatosplenomegaly     Musculoskeletal:  No edema, warm, 2+ pulses throughout    Neurologic:  Moves all extremities. AAOx self/hospital weak on other orientation questions , CN II-XII reviewed            Data Review:    Review and/or order of clinical lab test  Review and/or order of tests in the radiology section of CPT  Review and/or order of tests in the medicine section of CPT      Labs:     Recent Labs     01/11/22  0505 01/10/22  1918 01/10/22  0234 01/10/22  0234   WBC 4.5  --   --  5.8   HGB 6.7* 7.1*   < > 7.4*   HCT 21.4* 22.8*   < > 23.9*     --   --  232    < > = values in this interval not displayed. Recent Labs     01/11/22  0505 01/10/22  0234 01/09/22  0441   * 135* 137   K 4.0 4.1 3.9    103 105   CO2 24 27 28   BUN 55* 53* 46*   CREA 2.16* 2.00* 1.95*   * 125* 124*   CA 8.5 8.5 8.8     No results for input(s): ALT, AP, TBIL, TBILI, TP, ALB, GLOB, GGT, AML, LPSE in the last 72 hours. No lab exists for component: SGOT, GPT, AMYP, HLPSE  No results for input(s): INR, PTP, APTT, INREXT, INREXT in the last 72 hours. No results for input(s): FE, TIBC, PSAT, FERR in the last 72 hours. Lab Results   Component Value Date/Time    Folate 7.8 12/14/2021 04:40 AM      No results for input(s): PH, PCO2, PO2 in the last 72 hours. No results for input(s): CPK, CKNDX, TROIQ in the last 72 hours.     No lab exists for component: CPKMB  Lab Results   Component Value Date/Time    Cholesterol, total 148 09/23/2020 04:27 AM    HDL Cholesterol 52 09/23/2020 04:27 AM    LDL, calculated 83.8 09/23/2020 04:27 AM    Triglyceride 61 09/23/2020 04:27 AM    CHOL/HDL Ratio 2.8 09/23/2020 04:27 AM     Lab Results   Component Value Date/Time    Glucose (POC) 108 12/21/2021 06:15 AM    Glucose (POC) 148 (H) 12/20/2021 09:54 PM    Glucose (POC) 156 (H) 12/18/2021 11:53 AM    Glucose (POC) 96 12/18/2021 10:18 AM    Glucose (POC) 120 (H) 04/24/2021 04:54 PM     Lab Results   Component Value Date/Time    Color YELLOW/STRAW 12/30/2021 08:23 PM    Appearance CLOUDY (A) 12/30/2021 08:23 PM    Specific gravity 1.017 12/30/2021 08:23 PM    pH (UA) 6.0 12/30/2021 08:23 PM    Protein 100 (A) 12/30/2021 08:23 PM    Glucose Negative 12/30/2021 08:23 PM    Ketone Negative 12/30/2021 08:23 PM    Bilirubin Negative 12/30/2021 08:23 PM    Urobilinogen 1.0 12/30/2021 08:23 PM    Nitrites Negative 12/30/2021 08:23 PM    Leukocyte Esterase MODERATE (A) 12/30/2021 08:23 PM    Epithelial cells FEW 12/30/2021 08:23 PM    Bacteria 1+ (A) 12/30/2021 08:23 PM    WBC 0-4 12/30/2021 08:23 PM    RBC 0-5 12/30/2021 08:23 PM         Medications Reviewed:     Current Facility-Administered Medications   Medication Dose Route Frequency    0.9% sodium chloride infusion 250 mL  250 mL IntraVENous PRN    carvediloL (COREG) tablet 12.5 mg  12.5 mg Oral BID WITH MEALS    [Held by provider] apixaban (ELIQUIS) tablet 2.5 mg  2.5 mg Oral BID    ARIPiprazole (ABILIFY) tablet 20 mg  20 mg Oral DAILY    atorvastatin (LIPITOR) tablet 40 mg  40 mg Oral QHS    busPIRone (BUSPAR) tablet 5 mg  5 mg Oral TID    carbidopa-levodopa (SINEMET)  mg per tablet 2 Tablet  2 Tablet Oral QID    cilostazoL (PLETAL) tablet 100 mg  100 mg Oral ACB&D    clopidogreL (PLAVIX) tablet 75 mg  75 mg Oral DAILY AFTER BREAKFAST    DULoxetine (CYMBALTA) capsule 120 mg  120 mg Oral DAILY    gabapentin (NEURONTIN) capsule 100 mg  100 mg Oral QHS    hydrALAZINE (APRESOLINE) tablet 50 mg  50 mg Oral TID    melatonin tablet 6 mg  6 mg Oral QHS    pantoprazole (PROTONIX) tablet 40 mg  40 mg Oral ACB    sodium chloride (NS) flush 5-40 mL  5-40 mL IntraVENous Q8H    sodium chloride (NS) flush 5-40 mL  5-40 mL IntraVENous PRN    acetaminophen (TYLENOL) tablet 650 mg  650 mg Oral Q6H PRN    Or    acetaminophen (TYLENOL) suppository 650 mg  650 mg Rectal Q6H PRN    polyethylene glycol (MIRALAX) packet 17 g  17 g Oral DAILY PRN    ondansetron (ZOFRAN ODT) tablet 4 mg  4 mg Oral Q8H PRN    Or    ondansetron (ZOFRAN) injection 4 mg  4 mg IntraVENous Q6H PRN    [Held by provider] bumetanide (BUMEX) injection 1 mg  1 mg IntraVENous Q12H     ______________________________________________________________________  EXPECTED LENGTH OF STAY: 3d 19h  ACTUAL LENGTH OF STAY:          3                 Pantera Gill MD

## 2022-01-11 NOTE — PROGRESS NOTES
Occupational Therapy: chart reviewed in prep for OT services. Noted order for PRBC- will defer OT at this time and follow up as appropriate.    Josh Samuel, OTR/L

## 2022-01-11 NOTE — PROGRESS NOTES
[address] [place] [address]               Division of Cardiology  [phone]                       Progress note              Roger Alvarez M.D., F.A.C.C. NAME:  Horace Martinez   :   1940   MRN:   [id number]         ICD-10-CM ICD-9-CM    1. Acute on chronic respiratory failure with hypoxia (HCC)  J96.21 518.84      799.02    2. Acute on chronic congestive heart failure, unspecified heart failure type (HCC)  I50.9 428.0    3. Systolic congestive heart failure, unspecified HF chronicity ([facility])  I50.20 428.20      428.0                  HPI  [de-identified]years old female with past medical history remarkable for hypertension, atrial fibrillation, coronary disease status post CABG.  Status post PCI of native LAD in 2021.  She does have severe three-vessel disease with a patent LIMA to the LAD and a patent graft sequential to the first diagonal obtuse marginal branch.     Also history of diastolic dysfunction and fluctuating ejection fraction. pci of native LAD on      Recently discharged from hospital following admission for shortness of breath she presents again with occurrence of shortness of breath.      To be noted the patient was previously considered for resynchronization therapy but it was felt that ejection fraction had improved and left bundle branch block was not as wide as before.     To be noted that she has undergone a renal angiogram in the past which demonstrated moderate renal arterial disease      Assessment/Plan: 1. Cardiomyopathy: Last echo current with ejection fraction 40 to 45%. She is now status post nuclear stress test which is failed to reveal any ischemia but confirmed the reduction in ejection fraction this time at 37%. Stop metoprolol. Start Coreg 12.5 twice daily.   Stop Cardizem given also LV dysfunction hopefully this will now consider increase the heart rate given the atrial fibrillation. Continue drowsing for now. Given hypotension stop Bumex for now. She will need degree of diuretic I suspect she has achieved dry state. Creatinine is rising and BUN is also rising. 2.  CAD: No chest pain reported. Continue beta-blocker and statin. Her last PCI was April 2021 therefore Plavix can be stopped if needed for GI bleed and severe anemia. 3.  Atrial fibrillation: Thus far controlled. Now off Cardizem and Coreg. Continue to follow heart rate. Off Eliquis on account of profound anemia and guaiac positive stools. 4.  Hypotension: Patient with episode of hypotension. This may be related to may be achieving a dry state. I am not sure that the I's and O's are accurate. Bumex will be stopped for now. We will attempt to resume beta-blocker. 5.  Guaiac positive stools with profound anemia defer to primary team evaluation and treatment. CARDIAC STUDIES      01/07/22    ECHO ADULT FOLLOW-UP OR LIMITED 01/09/2022 1/9/2022    Interpretation Summary    Left Ventricle: Left ventricle size is normal. Mildly increased wall thickness. Mild global hypokinesis present. Mildly reduced left ventricular systolic function with a visually estimated EF of 40 - 45%.   Mitral Valve: Moderate posterior mitral annular calcification.   Pericardium: Left pleural effusion.   Contrast used: Definity. Signed by: Charo Del Angel MD on 1/9/2022  2:52 PM      01/07/22    NUCLEAR CARDIAC STRESS TEST 01/11/2022, 01/11/2022 1/11/2022    Interpretation Summary    ECG: Resting ECG demonstrates atrial fibrillation and left bundle branch block.   ECG: Stress ECG was not diagnostic due to left bundle branch block.   Stress Test: A pharmacological stress test was performed using regadenoson. Blood pressure demonstrated a normal response and heart rate demonstrated a normal response to stress. INDICATION: Shortness of breath, dyspnea.   History of CABG, hypertension, coronary artery disease, diabetes    COMPARISON:  None. CORRELATIVE IMAGING STUDIES:  None    TRACER: Tc 99m Sestamibi    TECHNIQUE:  Resting SPECT images of the heart were obtained following the uneventful intravenous administration of 11.0 mCi of Tc 99m Sestamibi. Gated stress SPECT images of the heart were obtained following Lexiscan protocol and the uneventful intravenous administration of 33.0 mCi of Tc 99m Sestamibi. FINDINGS:  The rest and stress perfusion images demonstrate mild left ventricular dilatation. Prominent diaphragmatic attenuation is noted without significant perfusion defect or evidence of myocardial reversibility. The gated images demonstrate global hypokinesia and slight septal dyskinesia. Left ventricular ejection fraction is 37 %. Impression:  Mild left ventricular dilatation. No evidence of myocardial ischemia or infarction. Left ventricular ejection fraction is 37 %. Global hypokinesia and slight septal dyskinesia. Signed by: Kasia Newell MD on 1/11/2022 12:41 PM, Signed by: Shelbi Alvarez MD on 1/11/2022  1:55 PM      03/29/21    INVASIVE VASCULAR PROCEDURE 04/05/2021 4/5/2021  CARDIAC PROCEDURE 04/05/2021 4/5/2021    Conclusion  Results: After angiography 60 to 70% stenosis of ostium of right renal artery was noted. On IVUS imaging the minimal luminal dimensions were 4 x 2.1 mm with MLA of 8 mm². Left renal angiogram demonstrated 50 to 60% angiographic stenosis which on IVUS appeared to be even less prominent with minimal luminal dimensions of 4.2 x 3.7 mm. Conclusion: Moderate to severe left renal artery stenosis and mild to moderate right renal arterial stenosis. Both stenotic lesions are ostial and atherosclerotic in etiology.     Signed by: Flor Torres MD on 4/5/2021 10:00 AM       @[email or web address]      IMAGING      CT Results (most recent):  Results from [place] encounter on 10/29/21    CT ABD PELV WO CONT    Narrative  EXAM: CT ABD PELV WO CONT    INDICATION: abd pain and tenderness, fevers    COMPARISON: 4/23/2021    CONTRAST:  None. TECHNIQUE:  Thin axial images were obtained through the abdomen and pelvis. Coronal and  sagittal reformats were generated. Oral contrast was not administered. CT dose  reduction was achieved through use of a standardized protocol tailored for this  examination and automatic exposure control for dose modulation. The absence of intravenous contrast material reduces the sensitivity for  evaluation of the vasculature and solid organs. FINDINGS:  LOWER THORAX: No significant abnormality in the incidentally imaged lower chest.  LIVER: No mass. BILIARY TREE: Gallstones are noted. CBD is not dilated. SPLEEN: within normal limits. PANCREAS: No focal abnormality. ADRENALS: Unremarkable. KIDNEYS/URETERS: Unchanged bilateral renal cysts, no follow-up required. No  renal or ureteral stone or evidence of hydronephrosis. STOMACH: Unremarkable. SMALL BOWEL: No dilatation or wall thickening. COLON: No dilatation or wall thickening. APPENDIX: Not visualized. PERITONEUM: No ascites or pneumoperitoneum. RETROPERITONEUM: Unchanged 4.3 cm infrarenal abdominal aortic aneurysm. REPRODUCTIVE ORGANS: There is no pelvic mass or lymphadenopathy. URINARY BLADDER: No mass or calculus. BONES: Degenerative changes are seen in the lumbar spine. ABDOMINAL WALL: No mass or hernia. ADDITIONAL COMMENTS: N/A    Impression  Cholelithiasis. No acute abnormality. XR Results (most recent):  Results from Hospital Encounter encounter on 01/07/22    XR CHEST PORT    Narrative  Indication: Respiratory failure    Comparison: 1/2/2022    Portable exam of the chest obtained at 2105 demonstrates normal heart size. The  patient is status post median sternotomy. Diffuse interstitial infiltrates are  noted. There are small bilateral pleural effusions. Chronic rotator cuff tear is  noted on the left.     Impression  Small bilateral pleural effusions and diffuse bilateral interstitial  infiltrates most likely related to pulmonary edema       MRI Results (most recent):          Past Medical History:   Diagnosis Date    Anxiety disorder     Atrial fibrillation (HCC)     CAD (coronary artery disease) 2007    stents, CABG x 3v    Carotid stenosis     Cervical stenosis of spinal canal 07/2019    Chronic kidney disease     Cough     CVA (cerebral vascular accident) ([facility] [address] 07/2019    left lacunar infarct at head of caudate    Depression     AND CHRONIC ANXIETY    Diabetes (HCC)     GERD (gastroesophageal reflux disease)     High cholesterol     History of peptic ulcer     Bleeding ulcer with increased NSAID use    Hypertension     Left carotid stenosis 07/2019    s/p left CEA with Dr. Meño Farr, old 2007    PUD (peptic ulcer disease)     Stroke ([facility] [address]     Tremor     Valvular heart disease            Past Surgical History:   Procedure Laterality Date    COLONOSCOPY N/A 12/17/2021    COLONOSCOPY   :- performed by Odalis Sullivan MD at [address] HX CAROTID ENDARTERECTOMY  07/2019    HX CORONARY ARTERY BYPASS GRAFT      HX TONSILLECTOMY  1963    [place] CARDIAC SURG PROCEDURE UNLIST      CABG X3 VESSEL    [place] TOTAL KNEE ARTHROPLASTY Right 2015               Visit Vitals  /75 (BP 1 Location: Right upper arm, BP Patient Position: Supine; At rest)   Pulse 100   Temp 98 °F (36.7 °C)   Resp 21   Ht 5' 5\" (1.651 m)   Wt 161 lb (73 kg)   SpO2 96%   BMI 26.79 kg/m²         Wt Readings from Last 3 Encounters:   01/11/22 161 lb (73 kg)   01/06/22 164 lb 3.9 oz (74.5 kg)   12/18/21 173 lb 8 oz (78.7 kg)         Review of Systems:   Pertinent items are noted in the History of Present Illness.          01/11 0701 - 01/11 1900  In: 118 [P.O.:118]  Out: -     01/09 1901 - 01/11 0700  In: 1416 [P.O.:1416]  Out: 8697 [Urine:1225]      Telemetry: AFIB    Physical Exam:    Neck: no JVD  Heart: irregularly irregular rhythm  Lungs: clear to auscultation bilaterally  Abdomen: soft, non-tender.  Bowel sounds normal. No masses,  no organomegaly  Extremities: no edema    Current Facility-Administered Medications   Medication Dose Route Frequency    0.9% sodium chloride infusion 250 mL  250 mL IntraVENous PRN    carvediloL (COREG) tablet 12.5 mg  12.5 mg Oral BID WITH MEALS    [Held by provider] apixaban (ELIQUIS) tablet 2.5 mg  2.5 mg Oral BID    ARIPiprazole (ABILIFY) tablet 20 mg  20 mg Oral DAILY    atorvastatin (LIPITOR) tablet 40 mg  40 mg Oral QHS    busPIRone (BUSPAR) tablet 5 mg  5 mg Oral TID    carbidopa-levodopa (SINEMET)  mg per tablet 2 Tablet  2 Tablet Oral QID    cilostazoL (PLETAL) tablet 100 mg  100 mg Oral ACB&D    clopidogreL (PLAVIX) tablet 75 mg  75 mg Oral DAILY AFTER BREAKFAST    DULoxetine (CYMBALTA) capsule 120 mg  120 mg Oral DAILY    gabapentin (NEURONTIN) capsule 100 mg  100 mg Oral QHS    hydrALAZINE (APRESOLINE) tablet 50 mg  50 mg Oral TID    melatonin tablet 6 mg  6 mg Oral QHS    pantoprazole (PROTONIX) tablet 40 mg  40 mg Oral ACB    sodium chloride (NS) flush 5-40 mL  5-40 mL IntraVENous Q8H    sodium chloride (NS) flush 5-40 mL  5-40 mL IntraVENous PRN    acetaminophen (TYLENOL) tablet 650 mg  650 mg Oral Q6H PRN    Or    acetaminophen (TYLENOL) suppository 650 mg  650 mg Rectal Q6H PRN    polyethylene glycol (MIRALAX) packet 17 g  17 g Oral DAILY PRN    ondansetron (ZOFRAN ODT) tablet 4 mg  4 mg Oral Q8H PRN    Or    ondansetron (ZOFRAN) injection 4 mg  4 mg IntraVENous Q6H PRN    bumetanide (BUMEX) injection 1 mg  1 mg IntraVENous Q12H         All Cardiac Markers in the last 24 hours:  No results found for: CPK, CK, CKMMB, CKMB, RCK3, CKMBT, CKMBPOC, CKNDX, CKND1, MONSTER, TROPT, TROIQ, ANABELLE, TROPT, TNIPOC, BNP, BNPP, BNPNT     Lab Results   Component Value Date/Time    Creatinine (POC) 1.6 (H) 02/24/2020 10:32 AM    Creatinine 2.16 (H) 01/11/2022 05:05 AM          Lab Results   Component Value Date/Time CK 75 07/11/2019 09:35 AM    CK - MB 2.7 07/11/2019 09:35 AM    CK-MB Index 3.6 (H) 07/11/2019 09:35 AM    Troponin-I, Qt. 0.05 (H) 04/24/2021 04:54 AM     (H) 06/08/2014 04:12 AM         Lab Results   Component Value Date/Time    WBC 4.5 01/11/2022 05:05 AM    HGB 6.7 (L) 01/11/2022 05:05 AM    HCT 21.4 (L) 01/11/2022 05:05 AM    PLATELET 637 33/71/8904 05:05 AM    .4 (H) 01/11/2022 05:05 AM         Lab Results   Component Value Date/Time    Sodium 132 (L) 01/11/2022 05:05 AM    Potassium 4.0 01/11/2022 05:05 AM    Chloride 100 01/11/2022 05:05 AM    CO2 24 01/11/2022 05:05 AM    Anion gap 8 01/11/2022 05:05 AM    Glucose 114 (H) 01/11/2022 05:05 AM    BUN 55 (H) 01/11/2022 05:05 AM    Creatinine 2.16 (H) 01/11/2022 05:05 AM    BUN/Creatinine ratio 25 (H) 01/11/2022 05:05 AM    GFR est AA 27 (L) 01/11/2022 05:05 AM    GFR est non-AA 22 (L) 01/11/2022 05:05 AM    Calcium 8.5 01/11/2022 05:05 AM         Lab Results   Component Value Date/Time     (H) 06/08/2014 04:12 AM     (H) 06/04/2014 04:44 AM    NT pro-BNP 21,802 (H) 01/10/2022 02:34 AM    NT pro-BNP 20,345 (H) 01/08/2022 02:41 AM    NT pro-BNP 13,944 (H) 01/03/2022 03:44 AM    NT pro-BNP 13,027 (H) 01/01/2022 05:33 AM    NT pro-BNP 17,648 (H) 12/30/2021 08:45 AM         Lab Results   Component Value Date/Time    Cholesterol, total 148 09/23/2020 04:27 AM    HDL Cholesterol 52 09/23/2020 04:27 AM    LDL, calculated 83.8 09/23/2020 04:27 AM    VLDL, calculated 12.2 09/23/2020 04:27 AM    Triglyceride 61 09/23/2020 04:27 AM    CHOL/HDL Ratio 2.8 09/23/2020 04:27 AM         Lab Results   Component Value Date/Time    ALT (SGPT) 15 01/07/2022 08:31 PM    Alk.  phosphatase 101 01/07/2022 08:31 PM    Bilirubin, total 0.5 01/07/2022 08:31 PM

## 2022-01-11 NOTE — PROGRESS NOTES
Problem: Risk for Spread of Infection  Goal: Prevent transmission of infectious organism to others  Description: Prevent the transmission of infectious organisms to other patients, staff members, and visitors. Outcome: Progressing Towards Goal     Problem: Falls - Risk of  Goal: *Absence of Falls  Description: Document Ben Michaud Fall Risk and appropriate interventions in the flowsheet.   Outcome: Progressing Towards Goal  Note: Fall Risk Interventions:  Mobility Interventions: Assess mobility with egress test,Bed/chair exit alarm,Patient to call before getting OOB,Utilize gait belt for transfers/ambulation    Mentation Interventions: Adequate sleep, hydration, pain control,Bed/chair exit alarm,Door open when patient unattended,Toileting rounds,More frequent rounding,Update white board    Medication Interventions: Bed/chair exit alarm,Patient to call before getting OOB,Teach patient to arise slowly    Elimination Interventions: Bed/chair exit alarm,Call light in reach,Patient to call for help with toileting needs,Toileting schedule/hourly rounds              Problem: General Medical Care Plan  Goal: *Optimal pain control at patient's stated goal  Outcome: Progressing Towards Goal  Goal: *Fluid volume balance  Outcome: Progressing Towards Goal

## 2022-01-11 NOTE — CALL BACK NOTE
Notified by RN that patient was hypotensive bp 74/53 but asymptomatic overnight after bumex given. BP improved to 100/67 after 250ml NS and albumin given. Morning labs showed a Hgb of 6.7.1 PRBC ordered as well as  Occult blood in stool.

## 2022-01-12 LAB
ABO + RH BLD: NORMAL
BACTERIA SPEC CULT: NORMAL
BLD PROD TYP BPU: NORMAL
BLOOD GROUP ANTIBODIES SERPL: NORMAL
BPU ID: NORMAL
CROSSMATCH RESULT,%XM: NORMAL
SERVICE CMNT-IMP: NORMAL
SPECIMEN EXP DATE BLD: NORMAL
STATUS OF UNIT,%ST: NORMAL
UNIT DIVISION, %UDIV: 0

## 2022-01-12 PROCEDURE — 94760 N-INVAS EAR/PLS OXIMETRY 1: CPT

## 2022-01-12 PROCEDURE — 65210000002 HC RM PRIVATE GYN

## 2022-01-12 PROCEDURE — 74011250637 HC RX REV CODE- 250/637: Performed by: SPECIALIST

## 2022-01-12 PROCEDURE — 97535 SELF CARE MNGMENT TRAINING: CPT

## 2022-01-12 PROCEDURE — 97110 THERAPEUTIC EXERCISES: CPT

## 2022-01-12 PROCEDURE — 97530 THERAPEUTIC ACTIVITIES: CPT

## 2022-01-12 PROCEDURE — 74011250636 HC RX REV CODE- 250/636: Performed by: INTERNAL MEDICINE

## 2022-01-12 PROCEDURE — 74011000250 HC RX REV CODE- 250: Performed by: STUDENT IN AN ORGANIZED HEALTH CARE EDUCATION/TRAINING PROGRAM

## 2022-01-12 PROCEDURE — 99233 SBSQ HOSP IP/OBS HIGH 50: CPT | Performed by: SPECIALIST

## 2022-01-12 PROCEDURE — 30233N1 TRANSFUSION OF NONAUTOLOGOUS RED BLOOD CELLS INTO PERIPHERAL VEIN, PERCUTANEOUS APPROACH: ICD-10-PCS | Performed by: STUDENT IN AN ORGANIZED HEALTH CARE EDUCATION/TRAINING PROGRAM

## 2022-01-12 PROCEDURE — 77030038269 HC DRN EXT URIN PURWCK BARD -A

## 2022-01-12 PROCEDURE — 74011250637 HC RX REV CODE- 250/637: Performed by: STUDENT IN AN ORGANIZED HEALTH CARE EDUCATION/TRAINING PROGRAM

## 2022-01-12 RX ORDER — CARVEDILOL 12.5 MG/1
25 TABLET ORAL 2 TIMES DAILY WITH MEALS
Status: DISCONTINUED | OUTPATIENT
Start: 2022-01-12 | End: 2022-01-14 | Stop reason: HOSPADM

## 2022-01-12 RX ORDER — HYDRALAZINE HYDROCHLORIDE 50 MG/1
25 TABLET, FILM COATED ORAL 3 TIMES DAILY
Status: DISCONTINUED | OUTPATIENT
Start: 2022-01-12 | End: 2022-01-14 | Stop reason: HOSPADM

## 2022-01-12 RX ORDER — DIGOXIN 0.25 MG/ML
250 INJECTION INTRAMUSCULAR; INTRAVENOUS
Status: COMPLETED | OUTPATIENT
Start: 2022-01-12 | End: 2022-01-12

## 2022-01-12 RX ORDER — DIGOXIN 0.25 MG/ML
250 INJECTION INTRAMUSCULAR; INTRAVENOUS
Status: DISCONTINUED | OUTPATIENT
Start: 2022-01-12 | End: 2022-01-12 | Stop reason: SDUPTHER

## 2022-01-12 RX ORDER — DIGOXIN 0.25 MG/ML
250 INJECTION INTRAMUSCULAR; INTRAVENOUS
Status: DISCONTINUED | OUTPATIENT
Start: 2022-01-12 | End: 2022-01-12

## 2022-01-12 RX ADMIN — CILOSTAZOL 100 MG: 50 TABLET ORAL at 17:09

## 2022-01-12 RX ADMIN — CARBIDOPA AND LEVODOPA 2 TABLET: 25; 100 TABLET ORAL at 22:56

## 2022-01-12 RX ADMIN — CARBIDOPA AND LEVODOPA 2 TABLET: 25; 100 TABLET ORAL at 17:09

## 2022-01-12 RX ADMIN — Medication 10 ML: at 23:00

## 2022-01-12 RX ADMIN — CLOPIDOGREL 75 MG: 75 TABLET, FILM COATED ORAL at 09:08

## 2022-01-12 RX ADMIN — CILOSTAZOL 100 MG: 50 TABLET ORAL at 06:39

## 2022-01-12 RX ADMIN — HYDRALAZINE HYDROCHLORIDE 25 MG: 50 TABLET, FILM COATED ORAL at 22:57

## 2022-01-12 RX ADMIN — HYDRALAZINE HYDROCHLORIDE 25 MG: 50 TABLET, FILM COATED ORAL at 17:09

## 2022-01-12 RX ADMIN — Medication 6 MG: at 22:56

## 2022-01-12 RX ADMIN — DULOXETINE HYDROCHLORIDE 120 MG: 60 CAPSULE, DELAYED RELEASE ORAL at 09:08

## 2022-01-12 RX ADMIN — HYDRALAZINE HYDROCHLORIDE 50 MG: 50 TABLET, FILM COATED ORAL at 09:07

## 2022-01-12 RX ADMIN — BUSPIRONE HYDROCHLORIDE 5 MG: 5 TABLET ORAL at 17:09

## 2022-01-12 RX ADMIN — CARBIDOPA AND LEVODOPA 2 TABLET: 25; 100 TABLET ORAL at 09:07

## 2022-01-12 RX ADMIN — Medication 10 ML: at 06:00

## 2022-01-12 RX ADMIN — DIGOXIN 250 MCG: 0.25 INJECTION INTRAMUSCULAR; INTRAVENOUS at 09:49

## 2022-01-12 RX ADMIN — Medication 10 ML: at 14:00

## 2022-01-12 RX ADMIN — ATORVASTATIN CALCIUM 40 MG: 40 TABLET, FILM COATED ORAL at 22:56

## 2022-01-12 RX ADMIN — BUSPIRONE HYDROCHLORIDE 5 MG: 5 TABLET ORAL at 09:08

## 2022-01-12 RX ADMIN — CARVEDILOL 12.5 MG: 12.5 TABLET, FILM COATED ORAL at 09:08

## 2022-01-12 RX ADMIN — CARBIDOPA AND LEVODOPA 2 TABLET: 25; 100 TABLET ORAL at 13:27

## 2022-01-12 RX ADMIN — BUSPIRONE HYDROCHLORIDE 5 MG: 5 TABLET ORAL at 22:57

## 2022-01-12 RX ADMIN — CARVEDILOL 25 MG: 12.5 TABLET, FILM COATED ORAL at 17:09

## 2022-01-12 RX ADMIN — PANTOPRAZOLE SODIUM 40 MG: 40 TABLET, DELAYED RELEASE ORAL at 06:39

## 2022-01-12 RX ADMIN — ARIPIPRAZOLE 20 MG: 10 TABLET ORAL at 09:08

## 2022-01-12 RX ADMIN — GABAPENTIN 100 MG: 100 CAPSULE ORAL at 22:57

## 2022-01-12 NOTE — PROGRESS NOTES
Problem: Mobility Impaired (Adult and Pediatric)  Goal: *Acute Goals and Plan of Care (Insert Text)  Description: Description: FUNCTIONAL STATUS PRIOR TO ADMISSION: The patient was functional at the wheelchair level and was modified independent for transfers to the wheelchair prior to recent admissions per pt report. Pt denies any falls in the last year. Pt reports she has not been OOB  when she was transferred back to [facility]. HOME SUPPORT PRIOR TO ADMISSION: The patient lived in [facility] due to her apartment lease ending in November 2021 and is looking for new living arrangements. Pt reports she has a niece in the area that assist her. Physical Therapy Goals  Initiated 1/2/2022  1. Patient will move from supine to sit and sit to supine  in bed with supervision within 7 day(s). 2.  Patient will transfer from bed to chair and chair to bed with moderate assistance using the least restrictive device within 7 day(s). 3.  Patient will perform sit to stand with moderate assistance within 7 day(s). Outcome: Progressing Towards Goal    PHYSICAL THERAPY TREATMENT  Patient: Hollei Barnard (38 y.o. female)  Date: 1/12/2022  Diagnosis: CHF (congestive heart failure) ([facility]) [I50.9] <principal problem not specified>       Precautions: Fall,Contact  Chart, physical therapy assessment, plan of care and goals were reviewed. ASSESSMENT  Patient continues with skilled PT services and is progressing towards goals. Pt received supine in bed and willing to work with therapy. Pt limited by decreased strength, balance and independence this session. Pt able to tolerate supine LE ex with VC for form with noted decreased strength with bilateral hip and knee flexion. Pt abl to continued with bed mobility to L side EOB with min a for trunk control. Pt continued good seated balance with support and no reports of dizziness.  Pt linens were saturated and was assisted with linen and personal care from PCT's during pt STS x2 at EOB with mod A. Pt continues with posterior lean with stand, VC for set up and LE adjustment to aid balance with little improvement, noted HR max around 140's with activity. . Pt reported needing to lay down due to fatigue, however had a BM during bed mobility back to supine and completed session with PCT bedside to address personal care. Rn notified of session. Current Level of Function Impacting Discharge (mobility/balance): min  A for bed mobility, mod/min A for STS bedside    Other factors to consider for discharge: fall risk, elevated HR, safety awareness         PLAN :  Patient continues to benefit from skilled intervention to address the above impairments. Continue treatment per established plan of care. to address goals. Recommendation for discharge: (in order for the patient to meet his/her long term goals)  Therapy up to 5 days/week in SNF setting    This discharge recommendation:  Has not yet been discussed the attending provider and/or case management    IF patient discharges home will need the following DME: portable oxygen and rolling walker       SUBJECTIVE:   Patient stated I just am very weak.     OBJECTIVE DATA SUMMARY:   Critical Behavior:  Neurologic State: Alert  Orientation Level: Oriented X4  Cognition: Follows commands  Safety/Judgement: Awareness of environment  Functional Mobility Training:  Bed Mobility:  Supine to Sit: Minimum assistance  Sit to Supine: Minimum assistance  Scooting: Minimum assistance    Transfers:  Sit to Stand:  Moderate assistance  Stand to Sit: Minimum assistance    Balance:  Sitting: Intact  Standing: Impaired  Standing - Static: Constant support;Poor  Standing - Dynamic : Not tested    Therapeutic Exercises:   Supine LE ex, AP, knee presses, heel slides x10 ea    Pain Rating:  No pain reported    Activity Tolerance:   Fair, desaturates with exertion and requires oxygen, and observed SOB with activity    After treatment patient left in no apparent distress:   Supine in bed, Call bell within reach, and Caregiver / family present    COMMUNICATION/COLLABORATION:   The patients plan of care was discussed with: Registered nurse.      Litzy Macdonald PTA   Time Calculation: 32 mins

## 2022-01-12 NOTE — PROGRESS NOTES
f  [address] [facility] Adult  Hospitalist Group                                                                                          Hospitalist Progress Note  Cordell Mandel MD  Answering service: [phone] from in house phone        Date of Service:  2022  NAME:  Sherren Providence  :  1940  MRN:  [id number]      Admission Summary:   Sherren Providence is a [de-identified] y.o. female with hx of afib on eliquis, htn, ckd iv, cad s/p cabg, cva, mdd w/ jas, gerd, parkinson who presents to hospital with complaints of sob. Patient was recently just discharged from hospital after admission for acute and chronic respiratory failure with hypoxia deemed secondary to CHF. Patient was discharged on 2 L of oxygen. She is seen and examined at bedside. States she has felt persistently short of breath over the last 24 hours. She denies any fever, chills, chest or abdominal pain, nausea, vomiting, cough, congestion, recent illness, palpitations, or dysuria. Per chart review, patient had been out of her baseline 2 L oxygen for an unknown amount of time. She was found hypoxic in low 80s and placed on 4 L nasal cannula. Patient was persistently hypoxic and tachypneic despite this and was therefore transferred to hospital for additional evaluation. Interval history / Subjective:     Afib RVR this am  Dig x 1 AM    Card increased bb  For f/u dig if cont to be issue, intol to bb etc  Ef 40 %- 45 %  No ischemic changes on lexican stress      Assessment & Plan:     Shortness of breath secondary to acute CHF and exacerbation  Patient has diastolic CHF with previous hospitalization and now rehospitalization for continued shortness of breath  Cardiology on board  Patient is diuresing on Bumex 1 mg IV twice daily we will continue  Daily weights  Strict I's and O's  Limited echo shows EF of 40 to 45% with left ventricular size normal and mild global hypokinesis.   Alexandria Hendrix ordered by Cardiology. - Neg for ischemic changes   - mild  sob, cp on rounds 1/12/2022   - tachy to 130's 1/12   - dig x 1 1/12 and BB incresed by card as well 1/12     Elevated troponin   not significant    Atrial fibrillation  Continue metoprolol and Cardizem   Rate controlled 1/10 - 1/11/2022 ; but up to 130's 1/12, bb increased, dig x 1     Hypertension  On hydralazine, diltiazem, metoprolol  Patient has been hypotensive so we have held her Bumex this afternoon. BP Readings from Last 1 Encounters:   01/12/22 125/71        GERD  Protonix    Parkinson's dementia/major depression with disorder/generalized anxiety  Carbidopa-levodopa    CKD stage IV  Kidney function improving with diuresis  Lab Results   Component Value Date/Time    Creatinine (POC) 1.6 (H) 02/24/2020 10:32 AM    Creatinine 2.16 (H) 01/11/2022 05:05 AM        Anemia:   - transfused 1/11;  NOAC held   - f/u lab,   - poor candidate for GI w/u  - stools occult blood,   - cont current PPI,   - f/u iron profile  - f/u counts     Holding Eliquis as patient's hemoglobin has dropped. Code status: DNR  DVT prophylaxis: eliquis    Care Plan discussed with: Patient/Family and Nurse  Anticipated Disposition: SNF/LTC and [facility]/Rehab  Anticipated Discharge: 24 hours to 48 hours     Hospital Problems  Date Reviewed: 1/6/2022          Codes Class Noted POA    CHF (congestive heart failure) ([facility] [address] ICD-10-CM: I50.9  ICD-9-CM: 428.0  1/8/2022 Unknown                Review of Systems:   A comprehensive review of systems was negative except for that written in the HPI. Vital Signs:    Last 24hrs VS reviewed since prior progress note.  Most recent are:  Visit Vitals  /71   Pulse (!) 104   Temp 98.5 °F (36.9 °C)   Resp 20   Ht 5' 5\" (1.651 m)   Wt 77 kg (169 lb 12.1 oz)   SpO2 94%   BMI 28.25 kg/m²         Intake/Output Summary (Last 24 hours) at 1/12/2022 1218  Last data filed at 1/11/2022 1520  Gross per 24 hour   Intake 537.2 ml   Output --   Net 537.2 ml        Physical Examination: I had a face to face encounter with this patient and independently examined them on 1/12/2022 as outlined below:          Constitutional:  No acute distress, cooperative   ENT:  Oral mucosa moist, oropharynx benign. Resp:  CTA bilaterally but with decreased breath sounds bilaterally. No wheezing/rhonchi/rales. No accessory muscle use     CV:  Irregular rhythm, tachy to 140's   no murmurs, gallops, rubs    GI:  Soft, non distended, non tender. normoactive bowel sounds, no hepatosplenomegaly     Musculoskeletal:  No edema, warm, 2+ pulses throughout    Neurologic:  Moves all extremities. AAOx self/hospital weak on other orientation questions , CN II-XII reviewed            Data Review:    Review and/or order of clinical lab test  Review and/or order of tests in the radiology section of CPT  Review and/or order of tests in the medicine section of CPT      Labs:     Recent Labs     01/11/22  1737 01/11/22  0505 01/10/22  1918 01/10/22  0234   WBC  --  4.5  --  5.8   HGB 8.4* 6.7*   < > 7.4*   HCT 25.9* 21.4*   < > 23.9*   PLT  --  218  --  232    < > = values in this interval not displayed. Recent Labs     01/11/22  0505 01/10/22  0234   * 135*   K 4.0 4.1    103   CO2 24 27   BUN 55* 53*   CREA 2.16* 2.00*   * 125*   CA 8.5 8.5     No results for input(s): ALT, AP, TBIL, TBILI, TP, ALB, GLOB, GGT, AML, LPSE in the last 72 hours. No lab exists for component: SGOT, GPT, AMYP, HLPSE  No results for input(s): INR, PTP, APTT, INREXT, INREXT in the last 72 hours. No results for input(s): FE, TIBC, PSAT, FERR in the last 72 hours. Lab Results   Component Value Date/Time    Folate 7.8 12/14/2021 04:40 AM      No results for input(s): PH, PCO2, PO2 in the last 72 hours. No results for input(s): CPK, CKNDX, TROIQ in the last 72 hours.     No lab exists for component: CPKMB  Lab Results   Component Value Date/Time    Cholesterol, total 148 09/23/2020 04:27 AM    HDL Cholesterol 52 09/23/2020 04:27 AM    LDL, calculated 83.8 09/23/2020 04:27 AM    Triglyceride 61 09/23/2020 04:27 AM    CHOL/HDL Ratio 2.8 09/23/2020 04:27 AM     Lab Results   Component Value Date/Time    Glucose (POC) 108 12/21/2021 06:15 AM    Glucose (POC) 148 (H) 12/20/2021 09:54 PM    Glucose (POC) 156 (H) 12/18/2021 11:53 AM    Glucose (POC) 96 12/18/2021 10:18 AM    Glucose (POC) 120 (H) 04/24/2021 04:54 PM     Lab Results   Component Value Date/Time    Color YELLOW/STRAW 12/30/2021 08:23 PM    Appearance CLOUDY (A) 12/30/2021 08:23 PM    Specific gravity 1.017 12/30/2021 08:23 PM    pH (UA) 6.0 12/30/2021 08:23 PM    Protein 100 (A) 12/30/2021 08:23 PM    Glucose Negative 12/30/2021 08:23 PM    Ketone Negative 12/30/2021 08:23 PM    Bilirubin Negative 12/30/2021 08:23 PM    Urobilinogen 1.0 12/30/2021 08:23 PM    Nitrites Negative 12/30/2021 08:23 PM    Leukocyte Esterase MODERATE (A) 12/30/2021 08:23 PM    Epithelial cells FEW 12/30/2021 08:23 PM    Bacteria 1+ (A) 12/30/2021 08:23 PM    WBC 0-4 12/30/2021 08:23 PM    RBC 0-5 12/30/2021 08:23 PM         Medications Reviewed:     Current Facility-Administered Medications   Medication Dose Route Frequency    hydrALAZINE (APRESOLINE) tablet 25 mg  25 mg Oral TID    carvediloL (COREG) tablet 25 mg  25 mg Oral BID WITH MEALS    0.9% sodium chloride infusion 250 mL  250 mL IntraVENous PRN    [Held by provider] apixaban (ELIQUIS) tablet 2.5 mg  2.5 mg Oral BID    ARIPiprazole (ABILIFY) tablet 20 mg  20 mg Oral DAILY    atorvastatin (LIPITOR) tablet 40 mg  40 mg Oral QHS    busPIRone (BUSPAR) tablet 5 mg  5 mg Oral TID    carbidopa-levodopa (SINEMET)  mg per tablet 2 Tablet  2 Tablet Oral QID    cilostazoL (PLETAL) tablet 100 mg  100 mg Oral ACB&D    clopidogreL (PLAVIX) tablet 75 mg  75 mg Oral DAILY AFTER BREAKFAST    DULoxetine (CYMBALTA) capsule 120 mg  120 mg Oral DAILY    gabapentin (NEURONTIN) capsule 100 mg  100 mg Oral QHS    melatonin tablet 6 mg  6 mg Oral QHS    pantoprazole (PROTONIX) tablet 40 mg  40 mg Oral ACB    sodium chloride (NS) flush 5-40 mL  5-40 mL IntraVENous Q8H    sodium chloride (NS) flush 5-40 mL  5-40 mL IntraVENous PRN    acetaminophen (TYLENOL) tablet 650 mg  650 mg Oral Q6H PRN    Or    acetaminophen (TYLENOL) suppository 650 mg  650 mg Rectal Q6H PRN    polyethylene glycol (MIRALAX) packet 17 g  17 g Oral DAILY PRN    ondansetron (ZOFRAN ODT) tablet 4 mg  4 mg Oral Q8H PRN    Or    ondansetron (ZOFRAN) injection 4 mg  4 mg IntraVENous Q6H PRN    [Held by provider] bumetanide (BUMEX) injection 1 mg  1 mg IntraVENous Q12H     ______________________________________________________________________  EXPECTED LENGTH OF STAY: 3d 19h  ACTUAL LENGTH OF STAY:          4                 Jose Alejandro Sanon MD

## 2022-01-12 NOTE — PROGRESS NOTES
7848: Pts HR sustaining AFIB 150s. MD notified. Digoxin ordered. 4031: Digoxin was given. Pts HR now uncontrolled AFIB 100-120s.

## 2022-01-12 NOTE — PROGRESS NOTES
[address] [place] [address]               Division of Cardiology  [phone]                       Progress note              Farhan Gomez M.D., MANDY.A.CRennyC. NAME:  Luis M Shannon   :   1940   MRN:   [id number]         ICD-10-CM ICD-9-CM    1. Acute on chronic respiratory failure with hypoxia (HCC)  J96.21 518.84      799.02    2. Acute on chronic congestive heart failure, unspecified heart failure type (HCC)  I50.9 428.0    3. Systolic congestive heart failure, unspecified HF chronicity (HCC)  I50.20 428.20      428.0                  HPI  [de-identified]years old female with past medical history remarkable for hypertension, atrial fibrillation, coronary disease status post CABG.  Status post PCI of native LAD in 2021.  She does have severe three-vessel disease with a patent LIMA to the LAD and a patent graft sequential to the first diagonal obtuse marginal branch.     Also history of diastolic dysfunction and fluctuating ejection fraction.     pci of native LAD on      Recently discharged from hospital following admission for shortness of breath she presents again with occurrence of shortness of breath.      To be noted the patient was previously considered for resynchronization therapy but it was felt that ejection fraction had improved and left bundle branch block was not as wide as before.     To be noted that she has undergone a renal angiogram in the past which demonstrated moderate renal arterial disease      She feels good today no complaints of cp or sob or palpitations         Assessment/Plan: 1.   Cardiomyopathy: Last echo current with ejection fraction 40 to 45%.     She is now status post nuclear stress test which is failed to reveal any ischemia but confirmed the reduction in ejection fraction this time at 37%.     metoprolol  And cardizem(trying not to use in CMP if possible) stopped    Coreg started increase to 25 mg bid(for better HR control)and decrease hydralazine     continue off bumex     2. CAD: No chest pain reported. Continue beta-blocker and statin. Her last PCI was April 2021 therefore Plavix can be stopped if needed for GI bleed and severe anemia.     3. Atrial fibrillation: hr increased after stopping metoprolol and cardizem  Increase coreg today     Off Eliquis on account of profound anemia and guaiac positive stools.     4. Hypotension: better continue off bumex for now     5.  Guaiac positive stools with profound anemia defer to primary team evaluation and treatment. Hb better post 1 prbc transfusion              CARDIAC STUDIES      01/07/22    ECHO ADULT FOLLOW-UP OR LIMITED 01/09/2022 1/9/2022    Interpretation Summary    Left Ventricle: Left ventricle size is normal. Mildly increased wall thickness. Mild global hypokinesis present. Mildly reduced left ventricular systolic function with a visually estimated EF of 40 - 45%.   Mitral Valve: Moderate posterior mitral annular calcification.   Pericardium: Left pleural effusion.   Contrast used: Definity. Signed by: Thomas Morales MD on 1/9/2022  2:52 PM      01/07/22    NUCLEAR CARDIAC STRESS TEST 01/11/2022, 01/11/2022 1/11/2022    Interpretation Summary    ECG: Resting ECG demonstrates atrial fibrillation and left bundle branch block.   ECG: Stress ECG was not diagnostic due to left bundle branch block.   Stress Test: A pharmacological stress test was performed using regadenoson. Blood pressure demonstrated a normal response and heart rate demonstrated a normal response to stress. INDICATION: Shortness of breath, dyspnea. History of CABG, hypertension, coronary artery disease, diabetes    COMPARISON:  None. CORRELATIVE IMAGING STUDIES:  None    TRACER: Tc 99m Sestamibi    TECHNIQUE:  Resting SPECT images of the heart were obtained following the uneventful intravenous administration of 11.0 mCi of Tc 99m Sestamibi.     Gated stress SPECT images of the heart were obtained following Lexiscan protocol and the uneventful intravenous administration of 33.0 mCi of Tc 99m Sestamibi. FINDINGS:  The rest and stress perfusion images demonstrate mild left ventricular dilatation. Prominent diaphragmatic attenuation is noted without significant perfusion defect or evidence of myocardial reversibility. The gated images demonstrate global hypokinesia and slight septal dyskinesia. Left ventricular ejection fraction is 37 %. Impression:  Mild left ventricular dilatation. No evidence of myocardial ischemia or infarction. Left ventricular ejection fraction is 37 %. Global hypokinesia and slight septal dyskinesia. Signed by: Doris Aguilera MD on 1/11/2022 12:41 PM, Signed by: Luis Alberto Alfredo MD on 1/11/2022  1:55 PM      03/29/21    INVASIVE VASCULAR PROCEDURE 04/05/2021 4/5/2021  CARDIAC PROCEDURE 04/05/2021 4/5/2021    Conclusion  Results: After angiography 60 to 70% stenosis of ostium of right renal artery was noted. On IVUS imaging the minimal luminal dimensions were 4 x 2.1 mm with MLA of 8 mm². Left renal angiogram demonstrated 50 to 60% angiographic stenosis which on IVUS appeared to be even less prominent with minimal luminal dimensions of 4.2 x 3.7 mm. Conclusion: Moderate to severe left renal artery stenosis and mild to moderate right renal arterial stenosis. Both stenotic lesions are ostial and atherosclerotic in etiology. Signed by: Olinda Armendariz MD on 4/5/2021 10:00 AM       @[email or web address]      IMAGING      CT Results (most recent):  Results from Hospital Encounter encounter on 10/29/21    CT ABD PELV WO CONT    Narrative  EXAM: CT ABD PELV WO CONT    INDICATION: abd pain and tenderness, fevers    COMPARISON: 4/23/2021    CONTRAST:  None. TECHNIQUE:  Thin axial images were obtained through the abdomen and pelvis. Coronal and  sagittal reformats were generated. Oral contrast was not administered.  CT dose  reduction was achieved through use of a standardized protocol tailored for this  examination and automatic exposure control for dose modulation. The absence of intravenous contrast material reduces the sensitivity for  evaluation of the vasculature and solid organs. FINDINGS:  LOWER THORAX: No significant abnormality in the incidentally imaged lower chest.  LIVER: No mass. BILIARY TREE: Gallstones are noted. CBD is not dilated. SPLEEN: within normal limits. PANCREAS: No focal abnormality. ADRENALS: Unremarkable. KIDNEYS/URETERS: Unchanged bilateral renal cysts, no follow-up required. No  renal or ureteral stone or evidence of hydronephrosis. STOMACH: Unremarkable. SMALL BOWEL: No dilatation or wall thickening. COLON: No dilatation or wall thickening. APPENDIX: Not visualized. PERITONEUM: No ascites or pneumoperitoneum. RETROPERITONEUM: Unchanged 4.3 cm infrarenal abdominal aortic aneurysm. REPRODUCTIVE ORGANS: There is no pelvic mass or lymphadenopathy. URINARY BLADDER: No mass or calculus. BONES: Degenerative changes are seen in the lumbar spine. ABDOMINAL WALL: No mass or hernia. ADDITIONAL COMMENTS: N/A    Impression  Cholelithiasis. No acute abnormality. XR Results (most recent):  Results from Hospital Encounter encounter on 01/07/22    XR CHEST PORT    Narrative  Indication: Respiratory failure    Comparison: 1/2/2022    Portable exam of the chest obtained at 2105 demonstrates normal heart size. The  patient is status post median sternotomy. Diffuse interstitial infiltrates are  noted. There are small bilateral pleural effusions. Chronic rotator cuff tear is  noted on the left.     Impression  Small bilateral pleural effusions and diffuse bilateral interstitial  infiltrates most likely related to pulmonary edema       MRI Results (most recent):          Past Medical History:   Diagnosis Date    Anxiety disorder     Atrial fibrillation (HCC)     CAD (coronary artery disease) 2007    stents, CABG x 3v    Carotid stenosis     Cervical stenosis of spinal canal 07/2019    Chronic kidney disease     Cough     [facility]A [facility]cere[facility] accident[facility] 07/2019    left lacunar infarct at head of caudate    Depression     AND CHRONIC ANXIETY    [facility]     GERD (gastroesophageal reflux disease)     High cholesterol     History of peptic ulcer     Bleeding ulcer with increased NSAID use    Hypertension     Left carotid stenosis 07/2019    s/p left CEA with Dr. Diana Nolen, old 2007    PUD (peptic ulcer disease)     Stroke ([facility]     Tremor     Valvular heart disease            Past Surgical History:   Procedure Laterality Date    COLONOSCOPY N/A 12/17/2021    COLONOSCOPY   :- performed by Yulissa Earl MD at [address] HX CAROTID ENDARTERECTOMY  07/2019    HX CORONARY ARTERY BYPASS GRAFT      HX TONSILLECTOMY  1963    [place] CARDIAC SURG PROCEDURE UNLIST      CABG X3 VESSEL    [place] TOTAL KNEE ARTHROPLASTY Right 2015               Visit Vitals  /71   Pulse 97   Temp 98.5 °F (36.9 °C)   Resp 20   Ht 5' 5\" (1.651 m)   Wt 169 lb 12.1 oz (77 kg)   SpO2 94%   BMI 28.25 kg/m²         Wt Readings from Last 3 Encounters:   01/12/22 169 lb 12.1 oz (77 kg)   01/06/22 164 lb 3.9 oz (74.5 kg)   12/18/21 173 lb 8 oz (78.7 kg)         Review of Systems:   Pertinent items are noted in the History of Present Illness. No intake/output data recorded. 01/10 1901 - 01/12 0700  In: 895.2 [P.O.:476]  Out: 1000 [Urine:1000]      Telemetry: normal sinus rhythm    Physical Exam:    Neck: no JVD  Heart: irregularly irregular rhythm  Lungs: diminished breath sounds R anterior, L anterior  Abdomen: soft, non-tender.  Bowel sounds normal. No masses,  no organomegaly  Extremities: no edema    Current Facility-Administered Medications   Medication Dose Route Frequency    0.9% sodium chloride infusion 250 mL  250 mL IntraVENous PRN    carvediloL (COREG) tablet 12.5 mg  12.5 mg Oral BID WITH MEALS  [Held by provider] apixaban (ELIQUIS) tablet 2.5 mg  2.5 mg Oral BID    ARIPiprazole (ABILIFY) tablet 20 mg  20 mg Oral DAILY    atorvastatin (LIPITOR) tablet 40 mg  40 mg Oral QHS    busPIRone (BUSPAR) tablet 5 mg  5 mg Oral TID    carbidopa-levodopa (SINEMET)  mg per tablet 2 Tablet  2 Tablet Oral QID    cilostazoL (PLETAL) tablet 100 mg  100 mg Oral ACB&D    clopidogreL (PLAVIX) tablet 75 mg  75 mg Oral DAILY AFTER BREAKFAST    DULoxetine (CYMBALTA) capsule 120 mg  120 mg Oral DAILY    gabapentin (NEURONTIN) capsule 100 mg  100 mg Oral QHS    hydrALAZINE (APRESOLINE) tablet 50 mg  50 mg Oral TID    melatonin tablet 6 mg  6 mg Oral QHS    pantoprazole (PROTONIX) tablet 40 mg  40 mg Oral ACB    sodium chloride (NS) flush 5-40 mL  5-40 mL IntraVENous Q8H    sodium chloride (NS) flush 5-40 mL  5-40 mL IntraVENous PRN    acetaminophen (TYLENOL) tablet 650 mg  650 mg Oral Q6H PRN    Or    acetaminophen (TYLENOL) suppository 650 mg  650 mg Rectal Q6H PRN    polyethylene glycol (MIRALAX) packet 17 g  17 g Oral DAILY PRN    ondansetron (ZOFRAN ODT) tablet 4 mg  4 mg Oral Q8H PRN    Or    ondansetron (ZOFRAN) injection 4 mg  4 mg IntraVENous Q6H PRN    [Held by provider] bumetanide (BUMEX) injection 1 mg  1 mg IntraVENous Q12H         All Cardiac Markers in the last 24 hours:  No results found for: CPK, CK, CKMMB, CKMB, RCK3, CKMBT, CKMBPOC, CKNDX, CKND1, MONSTER, TROPT, TROIQ, ANABELLE, TROPT, TNIPOC, BNP, BNPP, BNPNT     Lab Results   Component Value Date/Time    Creatinine (POC) 1.6 (H) 02/24/2020 10:32 AM    Creatinine 2.16 (H) 01/11/2022 05:05 AM          Lab Results   Component Value Date/Time    CK 75 07/11/2019 09:35 AM    CK - MB 2.7 07/11/2019 09:35 AM    CK-MB Index 3.6 (H) 07/11/2019 09:35 AM    Troponin-I, Qt. 0.05 (H) 04/24/2021 04:54 AM     (H) 06/08/2014 04:12 AM         Lab Results   Component Value Date/Time    WBC 4.5 01/11/2022 05:05 AM    HGB 8.4 (L) 01/11/2022 05:37 PM HCT 25.9 (L) 01/11/2022 05:37 PM    PLATELET 099 43/53/1874 05:05 AM    .4 (H) 01/11/2022 05:05 AM         Lab Results   Component Value Date/Time    Sodium 132 (L) 01/11/2022 05:05 AM    Potassium 4.0 01/11/2022 05:05 AM    Chloride 100 01/11/2022 05:05 AM    CO2 24 01/11/2022 05:05 AM    Anion gap 8 01/11/2022 05:05 AM    Glucose 114 (H) 01/11/2022 05:05 AM    BUN 55 (H) 01/11/2022 05:05 AM    Creatinine 2.16 (H) 01/11/2022 05:05 AM    BUN/Creatinine ratio 25 (H) 01/11/2022 05:05 AM    GFR est AA 27 (L) 01/11/2022 05:05 AM    GFR est non-AA 22 (L) 01/11/2022 05:05 AM    Calcium 8.5 01/11/2022 05:05 AM         Lab Results   Component Value Date/Time     (H) 06/08/2014 04:12 AM     (H) 06/04/2014 04:44 AM    NT pro-BNP 21,802 (H) 01/10/2022 02:34 AM    NT pro-BNP 20,345 (H) 01/08/2022 02:41 AM    NT pro-BNP 13,944 (H) 01/03/2022 03:44 AM    NT pro-BNP 13,027 (H) 01/01/2022 05:33 AM    NT pro-BNP 17,648 (H) 12/30/2021 08:45 AM         Lab Results   Component Value Date/Time    Cholesterol, total 148 09/23/2020 04:27 AM    HDL Cholesterol 52 09/23/2020 04:27 AM    LDL, calculated 83.8 09/23/2020 04:27 AM    VLDL, calculated 12.2 09/23/2020 04:27 AM    Triglyceride 61 09/23/2020 04:27 AM    CHOL/HDL Ratio 2.8 09/23/2020 04:27 AM         Lab Results   Component Value Date/Time    ALT (SGPT) 15 01/07/2022 08:31 PM    Alk.  phosphatase 101 01/07/2022 08:31 PM    Bilirubin, total 0.5 01/07/2022 08:31 PM

## 2022-01-12 NOTE — PROGRESS NOTES
1850: Was notified by PCT that pt now has a black L eye. Went to assess pt. Asked what happened. Pt stated nothing happened and her eye has been watering all day. Asked pt if it hurts and pt stated \"It doesn't hurt, it's just been watering all day. \" MD notified. No further orders were given. Will continue to monitor and pass along to night shift.

## 2022-01-12 NOTE — PROGRESS NOTES
Bedside and Verbal shift change report given to Alphonso Almanza (oncoming nurse) by Ry London RN (offgoing nurse). Report included the following information SBAR, Kardex, ED Summary, Procedure Summary, MAR, Recent Results and Cardiac Rhythm .

## 2022-01-12 NOTE — PROGRESS NOTES
Problem: Self Care Deficits Care Plan (Adult)  Goal: *Acute Goals and Plan of Care (Insert Text)  Description: FUNCTIONAL STATUS PRIOR TO ADMISSION:Per pt report and chart review, pt was functional at the wheelchair level and mod I with functional transfers to the w/c prior to last hospital admission. Pt admitted most recently from [facility] long term care. HOME SUPPORT: The patient was in long term care at [facility]. However, prior to Nov 2021 pt had her own apartment and a niece living locally to provide intermittent assistance. Occupational Therapy Goals  Initiated 1/10/2022  1. Patient will perform grooming tasks seated EOB with modified independence within 7 day(s). 2.  Patient will perform lower body dressing with minimal assistance/contact guard assist within 7 day(s). 3.  Patient will perform upper body dressing with modified independence within 7 day(s). 4.  Patient will perform toilet transfers with minimal assistance/contact guard assist within 7 day(s). 5.  Patient will perform all aspects of toileting with minimal assistance/contact guard assist within 7 day(s). 6.  Patient will participate in upper extremity therapeutic exercise/activities with supervision/set-up for 5 minutes within 7 day(s). Outcome: Progressing Towards Goal    OCCUPATIONAL THERAPY TREATMENT  Patient: Luis M Shannon (30 y.o. female)  Date: 1/12/2022  Diagnosis: [facility]estive [facility] [I50.9] <principal problem not specified>       Precautions: Fall,Contact  Chart, occupational therapy assessment, plan of care, and goals were reviewed. ASSESSMENT  Patient continues with skilled OT services and is slowly  progressing towards goals. Pt received semi-supine and agreeable to participation in therapy session following encouragement and education on benefits of OT intervention/active ADL engagement. Pt progressed to EOB with min A and extended time.  Once seated, pt performing grooming tasks with stand-by assistance and up to min A to comb posterior sections of hair. Her functional independence and performance of ADL/IADL tasks continues to be limited at this time by impaired balance, decreased motivation/volition for active ADL task performance, and decreased activity tolerance/endurance. Current Level of Function Impacting Discharge (ADLs): up to min A bed mobility, stand by assistance seated UB ADLs    Other factors to consider for discharge: from [address] [place] [address],[address] :  Patient continues to benefit from skilled intervention to address the above impairments. Continue treatment per established plan of care to address goals. Recommendation for discharge: (in order for the patient to meet his/her long term goals)  Therapy up to 5 days/week in SNF setting    This discharge recommendation:  Has been made in collaboration with the attending provider and/or case management    IF patient discharges home will need the following DME: patient owns DME required for discharge       SUBJECTIVE:   Patient stated Karyn Nash are we doing now?     OBJECTIVE DATA SUMMARY:   Cognitive/Behavioral Status:  Neurologic State: Alert  Orientation Level: Oriented to place;Oriented to person;Disoriented to time;Disoriented to situation  Cognition: Follows commands             Functional Mobility and Transfers for ADLs:  Bed Mobility:  Supine to Sit: Minimum assistance  Sit to Supine: Minimum assistance  Scooting: Minimum assistance    Transfers:  Sit to Stand:  Moderate assistance          Balance:  Sitting: Intact  Sitting - Static: Good (unsupported)  Sitting - Dynamic: Good (unsupported)  Standing:  (not tested)  Standing - Static: Constant support;Poor  Standing - Dynamic : Not tested    ADL Intervention:  Feeding  Feeding Assistance: Set-up    Grooming  Position Performed: Seated edge of bed  Washing Face: Stand-by assistance  Brushing/Combing Hair: Stand-by assistance;Minimum assistance (up to min for posterior sections)  Cues: Physical assistance;Verbal cues provided    Upper Body Bathing  Bathing Assistance: Stand-by assistance  Position Performed: Seated edge of bed  Cues: Verbal cues provided                             Pain:  None reported     Activity Tolerance:   Fair and requires rest breaks    After treatment patient left in no apparent distress:   Supine in bed, Heels elevated for pressure relief, Call bell within reach, Bed / chair alarm activated and Side rails x 3, set up with lunch tray     COMMUNICATION/COLLABORATION:   The patients plan of care was discussed with: Registered nurse.      Tod Covarrubias OT  Time Calculation: 30 mins

## 2022-01-13 LAB
ANION GAP SERPL CALC-SCNC: 8 MMOL/L (ref 5–15)
BASOPHILS # BLD: 0.1 K/UL (ref 0–0.1)
BASOPHILS NFR BLD: 1 % (ref 0–1)
BUN SERPL-MCNC: 52 MG/DL (ref 6–20)
BUN/CREAT SERPL: 26 (ref 12–20)
CALCIUM SERPL-MCNC: 9.3 MG/DL (ref 8.5–10.1)
CHLORIDE SERPL-SCNC: 103 MMOL/L (ref 97–108)
CO2 SERPL-SCNC: 25 MMOL/L (ref 21–32)
CREAT SERPL-MCNC: 1.97 MG/DL (ref 0.55–1.02)
DIFFERENTIAL METHOD BLD: ABNORMAL
EOSINOPHIL # BLD: 0.3 K/UL (ref 0–0.4)
EOSINOPHIL NFR BLD: 5 % (ref 0–7)
ERYTHROCYTE [DISTWIDTH] IN BLOOD BY AUTOMATED COUNT: 13.7 % (ref 11.5–14.5)
GLUCOSE BLD STRIP.AUTO-MCNC: 110 MG/DL (ref 65–117)
GLUCOSE SERPL-MCNC: 112 MG/DL (ref 65–100)
HCT VFR BLD AUTO: 26.2 % (ref 35–47)
HGB BLD-MCNC: 8.6 G/DL (ref 11.5–16)
IMM GRANULOCYTES # BLD AUTO: 0 K/UL (ref 0–0.04)
IMM GRANULOCYTES NFR BLD AUTO: 0 % (ref 0–0.5)
IRON SATN MFR SERPL: 17 % (ref 20–50)
IRON SERPL-MCNC: 45 UG/DL (ref 35–150)
LYMPHOCYTES # BLD: 0.8 K/UL (ref 0.8–3.5)
LYMPHOCYTES NFR BLD: 12 % (ref 12–49)
MCH RBC QN AUTO: 32.5 PG (ref 26–34)
MCHC RBC AUTO-ENTMCNC: 32.8 G/DL (ref 30–36.5)
MCV RBC AUTO: 98.9 FL (ref 80–99)
MONOCYTES # BLD: 0.6 K/UL (ref 0–1)
MONOCYTES NFR BLD: 10 % (ref 5–13)
NEUTS SEG # BLD: 4.6 K/UL (ref 1.8–8)
NEUTS SEG NFR BLD: 72 % (ref 32–75)
NRBC # BLD: 0 K/UL (ref 0–0.01)
NRBC BLD-RTO: 0 PER 100 WBC
PLATELET # BLD AUTO: 266 K/UL (ref 150–400)
PMV BLD AUTO: 9 FL (ref 8.9–12.9)
POTASSIUM SERPL-SCNC: 4.2 MMOL/L (ref 3.5–5.1)
RBC # BLD AUTO: 2.65 M/UL (ref 3.8–5.2)
RBC MORPH BLD: ABNORMAL
SERVICE CMNT-IMP: NORMAL
SODIUM SERPL-SCNC: 136 MMOL/L (ref 136–145)
TIBC SERPL-MCNC: 263 UG/DL (ref 250–450)
WBC # BLD AUTO: 6.4 K/UL (ref 3.6–11)

## 2022-01-13 PROCEDURE — 74011250637 HC RX REV CODE- 250/637: Performed by: SPECIALIST

## 2022-01-13 PROCEDURE — 74011000250 HC RX REV CODE- 250: Performed by: STUDENT IN AN ORGANIZED HEALTH CARE EDUCATION/TRAINING PROGRAM

## 2022-01-13 PROCEDURE — 82962 GLUCOSE BLOOD TEST: CPT

## 2022-01-13 PROCEDURE — 65210000002 HC RM PRIVATE GYN

## 2022-01-13 PROCEDURE — 74011250637 HC RX REV CODE- 250/637: Performed by: STUDENT IN AN ORGANIZED HEALTH CARE EDUCATION/TRAINING PROGRAM

## 2022-01-13 PROCEDURE — 36415 COLL VENOUS BLD VENIPUNCTURE: CPT

## 2022-01-13 PROCEDURE — 85025 COMPLETE CBC W/AUTO DIFF WBC: CPT

## 2022-01-13 PROCEDURE — 80048 BASIC METABOLIC PNL TOTAL CA: CPT

## 2022-01-13 PROCEDURE — 83540 ASSAY OF IRON: CPT

## 2022-01-13 PROCEDURE — 99232 SBSQ HOSP IP/OBS MODERATE 35: CPT | Performed by: SPECIALIST

## 2022-01-13 RX ORDER — DILTIAZEM HYDROCHLORIDE 30 MG/1
30 TABLET, FILM COATED ORAL
Status: DISCONTINUED | OUTPATIENT
Start: 2022-01-13 | End: 2022-01-14 | Stop reason: HOSPADM

## 2022-01-13 RX ADMIN — HYDRALAZINE HYDROCHLORIDE 25 MG: 50 TABLET, FILM COATED ORAL at 21:33

## 2022-01-13 RX ADMIN — CARVEDILOL 25 MG: 12.5 TABLET, FILM COATED ORAL at 17:23

## 2022-01-13 RX ADMIN — BUSPIRONE HYDROCHLORIDE 5 MG: 5 TABLET ORAL at 21:32

## 2022-01-13 RX ADMIN — CILOSTAZOL 100 MG: 50 TABLET ORAL at 17:23

## 2022-01-13 RX ADMIN — ARIPIPRAZOLE 20 MG: 10 TABLET ORAL at 09:08

## 2022-01-13 RX ADMIN — BUSPIRONE HYDROCHLORIDE 5 MG: 5 TABLET ORAL at 17:23

## 2022-01-13 RX ADMIN — CARBIDOPA AND LEVODOPA 2 TABLET: 25; 100 TABLET ORAL at 17:23

## 2022-01-13 RX ADMIN — PANTOPRAZOLE SODIUM 40 MG: 40 TABLET, DELAYED RELEASE ORAL at 07:29

## 2022-01-13 RX ADMIN — Medication 10 ML: at 12:38

## 2022-01-13 RX ADMIN — HYDRALAZINE HYDROCHLORIDE 25 MG: 50 TABLET, FILM COATED ORAL at 09:07

## 2022-01-13 RX ADMIN — ATORVASTATIN CALCIUM 40 MG: 40 TABLET, FILM COATED ORAL at 21:33

## 2022-01-13 RX ADMIN — CARVEDILOL 25 MG: 12.5 TABLET, FILM COATED ORAL at 09:07

## 2022-01-13 RX ADMIN — CARBIDOPA AND LEVODOPA 2 TABLET: 25; 100 TABLET ORAL at 21:32

## 2022-01-13 RX ADMIN — GABAPENTIN 100 MG: 100 CAPSULE ORAL at 21:33

## 2022-01-13 RX ADMIN — Medication 10 ML: at 21:34

## 2022-01-13 RX ADMIN — BUSPIRONE HYDROCHLORIDE 5 MG: 5 TABLET ORAL at 09:07

## 2022-01-13 RX ADMIN — Medication 10 ML: at 07:29

## 2022-01-13 RX ADMIN — CARBIDOPA AND LEVODOPA 2 TABLET: 25; 100 TABLET ORAL at 12:35

## 2022-01-13 RX ADMIN — HYDRALAZINE HYDROCHLORIDE 25 MG: 50 TABLET, FILM COATED ORAL at 17:23

## 2022-01-13 RX ADMIN — DILTIAZEM HYDROCHLORIDE 30 MG: 30 TABLET, FILM COATED ORAL at 12:35

## 2022-01-13 RX ADMIN — CARBIDOPA AND LEVODOPA 2 TABLET: 25; 100 TABLET ORAL at 09:07

## 2022-01-13 RX ADMIN — CILOSTAZOL 100 MG: 50 TABLET ORAL at 07:29

## 2022-01-13 RX ADMIN — DILTIAZEM HYDROCHLORIDE 30 MG: 30 TABLET, FILM COATED ORAL at 17:23

## 2022-01-13 RX ADMIN — CLOPIDOGREL 75 MG: 75 TABLET, FILM COATED ORAL at 09:07

## 2022-01-13 RX ADMIN — DULOXETINE HYDROCHLORIDE 120 MG: 60 CAPSULE, DELAYED RELEASE ORAL at 09:07

## 2022-01-13 RX ADMIN — Medication 6 MG: at 21:32

## 2022-01-13 NOTE — PROGRESS NOTES
[address] [place] [address]               Division of Cardiology  [phone]                       Progress note              Evelin Castañeda M.D., F.A.C.C. NAME:  Jeevan Samayoa   :   1940   MRN:   [id number]         ICD-10-CM ICD-9-CM    1. Acute on chronic respiratory failure with hypoxia (HCC)  J96.21 518.84      799.02    2. Acute on chronic congestive heart failure, unspecified heart failure type (HCC)  I50.9 428.0    3. Systolic congestive heart failure, unspecified HF chronicity (HCC)  I50.20 428.20      428.0    4. Chronic atrial fibrillation ([facility])  I48.20 427.31                  HPI  [de-identified]years old female with past medical history remarkable for hypertension, atrial fibrillation, coronary disease status post CABG.  Status post PCI of native LAD in 2021.  She does have severe three-vessel disease with a patent LIMA to the LAD and a patent graft sequential to the first diagonal obtuse marginal branch.     Also history of diastolic dysfunction and fluctuating ejection fraction.     pci of native LAD on      Recently discharged from hospital following admission for shortness of breath she presents again with occurrence of shortness of breath.      To be noted the patient was previously considered for resynchronization therapy but it was felt that ejection fraction had improved and left bundle branch block was not as wide as before.     To be noted that she has undergone a renal angiogram in the past which demonstrated moderate renal arterial disease        She feels good today no complaints of cp or sob or palpitations      Assessment/Plan:1. CMP: EF 40-45% no ischemia by recent nuclear stress test  She seems clinically compensated    Continue coreg and hydralazine    Continue off bumex    2. Atrial fibrillation: She remains in atrial fibrillation but unfortunately heart rates not yet controlled.     Coreg was increased yesterday but she still remains approximate 100 1020. I do not think she is a good candidate for long-term digoxin given renal dysfunction. Fortunately I feel that we need to resume a small dose of calcium channel blocker. We will try with Cardizem 30 mg p.o. twice daily. Currently off Eliquis on account of anemia and guaiac positive stools. 3.  Anemia: As per primary team.           CARDIAC STUDIES      01/07/22    ECHO ADULT FOLLOW-UP OR LIMITED 01/09/2022 1/9/2022    Interpretation Summary    Left Ventricle: Left ventricle size is normal. Mildly increased wall thickness. Mild global hypokinesis present. Mildly reduced left ventricular systolic function with a visually estimated EF of 40 - 45%.   Mitral Valve: Moderate posterior mitral annular calcification.   Pericardium: Left pleural effusion.   Contrast used: Definity. Signed by: Grady Ambrose MD on 1/9/2022  2:52 PM      01/07/22    NUCLEAR CARDIAC STRESS TEST 01/11/2022, 01/11/2022 1/11/2022    Interpretation Summary    ECG: Resting ECG demonstrates atrial fibrillation and left bundle branch block.   ECG: Stress ECG was not diagnostic due to left bundle branch block.   Stress Test: A pharmacological stress test was performed using regadenoson. Blood pressure demonstrated a normal response and heart rate demonstrated a normal response to stress. INDICATION: Shortness of breath, dyspnea. History of CABG, hypertension, coronary artery disease, diabetes    COMPARISON:  None. CORRELATIVE IMAGING STUDIES:  None    TRACER: Tc 99m Sestamibi    TECHNIQUE:  Resting SPECT images of the heart were obtained following the uneventful intravenous administration of 11.0 mCi of Tc 99m Sestamibi. Gated stress SPECT images of the heart were obtained following Lexiscan protocol and the uneventful intravenous administration of 33.0 mCi of Tc 99m Sestamibi.     FINDINGS:  The rest and stress perfusion images demonstrate mild left ventricular dilatation. Prominent diaphragmatic attenuation is noted without significant perfusion defect or evidence of myocardial reversibility. The gated images demonstrate global hypokinesia and slight septal dyskinesia. Left ventricular ejection fraction is 37 %. Impression:  Mild left ventricular dilatation. No evidence of myocardial ischemia or infarction. Left ventricular ejection fraction is 37 %. Global hypokinesia and slight septal dyskinesia. Signed by: Branden Gabriel MD on 1/11/2022 12:41 PM, Signed by: Víctor Lara MD on 1/11/2022  1:55 PM      03/29/21    INVASIVE VASCULAR PROCEDURE 04/05/2021 4/5/2021  CARDIAC PROCEDURE 04/05/2021 4/5/2021    Conclusion  Results: After angiography 60 to 70% stenosis of ostium of right renal artery was noted. On IVUS imaging the minimal luminal dimensions were 4 x 2.1 mm with MLA of 8 mm². Left renal angiogram demonstrated 50 to 60% angiographic stenosis which on IVUS appeared to be even less prominent with minimal luminal dimensions of 4.2 x 3.7 mm. Conclusion: Moderate to severe left renal artery stenosis and mild to moderate right renal arterial stenosis. Both stenotic lesions are ostial and atherosclerotic in etiology. Signed by: Markos Sorto MD on 4/5/2021 10:00 AM       @[email or web address]      IMAGING      CT Results (most recent):  Results from Hospital Encounter encounter on 10/29/21    CT ABD PELV WO CONT    Narrative  EXAM: CT ABD PELV WO CONT    INDICATION: abd pain and tenderness, fevers    COMPARISON: 4/23/2021    CONTRAST:  None. TECHNIQUE:  Thin axial images were obtained through the abdomen and pelvis. Coronal and  sagittal reformats were generated. Oral contrast was not administered. CT dose  reduction was achieved through use of a standardized protocol tailored for this  examination and automatic exposure control for dose modulation.     The absence of intravenous contrast material reduces the sensitivity for  evaluation of the vasculature and solid organs. FINDINGS:  LOWER THORAX: No significant abnormality in the incidentally imaged lower chest.  LIVER: No mass. BILIARY TREE: Gallstones are noted. CBD is not dilated. SPLEEN: within normal limits. PANCREAS: No focal abnormality. ADRENALS: Unremarkable. KIDNEYS/URETERS: Unchanged bilateral renal cysts, no follow-up required. No  renal or ureteral stone or evidence of hydronephrosis. STOMACH: Unremarkable. SMALL BOWEL: No dilatation or wall thickening. COLON: No dilatation or wall thickening. APPENDIX: Not visualized. PERITONEUM: No ascites or pneumoperitoneum. RETROPERITONEUM: Unchanged 4.3 cm infrarenal abdominal aortic aneurysm. REPRODUCTIVE ORGANS: There is no pelvic mass or lymphadenopathy. URINARY BLADDER: No mass or calculus. BONES: Degenerative changes are seen in the lumbar spine. ABDOMINAL WALL: No mass or hernia. ADDITIONAL COMMENTS: N/A    Impression  Cholelithiasis. No acute abnormality. XR Results (most recent):  Results from Hospital Encounter encounter on 01/07/22    XR CHEST PORT    Narrative  Indication: Respiratory failure    Comparison: 1/2/2022    Portable exam of the chest obtained at 2105 demonstrates normal heart size. The  patient is status post median sternotomy. Diffuse interstitial infiltrates are  noted. There are small bilateral pleural effusions. Chronic rotator cuff tear is  noted on the left.     Impression  Small bilateral pleural effusions and diffuse bilateral interstitial  infiltrates most likely related to pulmonary edema       MRI Results (most recent):          Past Medical History:   Diagnosis Date    Anxiety disorder     Atrial fibrillation (HCC)     CAD (coronary artery disease) 2007    stents, CABG x 3v    Carotid stenosis     Cervical stenosis of spinal canal 07/2019    Chronic kidney disease     Cough     CVA (cerebral vascular accident) ([address] 07/2019    left lacunar infarct at head of caudate    Depression AND CHRONIC ANXIETY    Diabetes [facility]     GERD (gastroesophageal reflux disease)     High cholesterol     History of peptic ulcer     Bleeding ulcer with increased NSAID use    Hypertension     Left carotid stenosis 07/2019    s/p left CEA with Dr. Army Cabrera, old 2007    PUD (peptic ulcer disease)     Stroke ([facility]     Tremor     Valvular heart disease            Past Surgical History:   Procedure Laterality Date    COLONOSCOPY N/A 12/17/2021    COLONOSCOPY   :- performed by Meryle Bing, [facility] at [facility] ENDOSCOPY    HX CAROTID ENDARTERECTOMY  07/2019    HX CORONARY ARTERY BYPASS GRAFT      HX TONSILLECTOMY  1963    HI CARDIAC SURG PROCEDURE UNLIST      CABG X3 VESSEL    HI TOTAL KNEE ARTHROPLASTY Right 2015               Visit Vitals  BP (!) 149/87 (BP 1 Location: Right upper arm, BP Patient Position: At rest)   Pulse (!) 114   Temp 98.7 °F (37.1 °C)   Resp 16   Ht 5' 5\" (1.651 m)   Wt 168 lb 6.9 oz (76.4 kg)   SpO2 97%   BMI 28.03 kg/m²         Wt Readings from Last 3 Encounters:   01/13/22 168 lb 6.9 oz (76.4 kg)   01/06/22 164 lb 3.9 oz (74.5 kg)   12/18/21 173 lb 8 oz (78.7 kg)         Review of Systems:   Pertinent items are noted in the History of Present Illness. No intake/output data recorded. No intake/output data recorded. Telemetry: AFIB    Physical Exam:    Neck: no JVD  Heart: irregularly irregular rhythm  Lungs: clear to auscultation bilaterally  Abdomen: soft, non-tender.  Bowel sounds normal. No masses,  no organomegaly  Extremities: no edema    Current Facility-Administered Medications   Medication Dose Route Frequency    hydrALAZINE (APRESOLINE) tablet 25 mg  25 mg Oral TID    carvediloL (COREG) tablet 25 mg  25 mg Oral BID WITH MEALS    0.9% sodium chloride infusion 250 mL  250 mL IntraVENous PRN    [Held by provider] apixaban (ELIQUIS) tablet 2.5 mg  2.5 mg Oral BID    ARIPiprazole (ABILIFY) tablet 20 mg  20 mg Oral DAILY    atorvastatin (LIPITOR) tablet 40 mg  40 mg Oral QHS    busPIRone (BUSPAR) tablet 5 mg  5 mg Oral TID    carbidopa-levodopa (SINEMET)  mg per tablet 2 Tablet  2 Tablet Oral QID    cilostazoL (PLETAL) tablet 100 mg  100 mg Oral ACB&D    clopidogreL (PLAVIX) tablet 75 mg  75 mg Oral DAILY AFTER BREAKFAST    DULoxetine (CYMBALTA) capsule 120 mg  120 mg Oral DAILY    gabapentin (NEURONTIN) capsule 100 mg  100 mg Oral QHS    melatonin tablet 6 mg  6 mg Oral QHS    pantoprazole (PROTONIX) tablet 40 mg  40 mg Oral ACB    sodium chloride (NS) flush 5-40 mL  5-40 mL IntraVENous Q8H    sodium chloride (NS) flush 5-40 mL  5-40 mL IntraVENous PRN    acetaminophen (TYLENOL) tablet 650 mg  650 mg Oral Q6H PRN    Or    acetaminophen (TYLENOL) suppository 650 mg  650 mg Rectal Q6H PRN    polyethylene glycol (MIRALAX) packet 17 g  17 g Oral DAILY PRN    ondansetron (ZOFRAN ODT) tablet 4 mg  4 mg Oral Q8H PRN    Or    ondansetron (ZOFRAN) injection 4 mg  4 mg IntraVENous Q6H PRN    [Held by provider] bumetanide (BUMEX) injection 1 mg  1 mg IntraVENous Q12H         All Cardiac Markers in the last 24 hours:  No results found for: CPK, CK, CKMMB, CKMB, RCK3, CKMBT, CKMBPOC, CKNDX, CKND1, MONSTER, TROPT, TROIQ, ANABELLE, TROPT, TNIPOC, BNP, BNPP, BNPNT     Lab Results   Component Value Date/Time    Creatinine (POC) 1.6 (H) 02/24/2020 10:32 AM    Creatinine 1.97 (H) 01/13/2022 03:45 AM          Lab Results   Component Value Date/Time    CK 75 07/11/2019 09:35 AM    CK - MB 2.7 07/11/2019 09:35 AM    CK-MB Index 3.6 (H) 07/11/2019 09:35 AM    Troponin-I, Qt. 0.05 (H) 04/24/2021 04:54 AM     (H) 06/08/2014 04:12 AM         Lab Results   Component Value Date/Time    WBC 6.4 01/13/2022 03:45 AM    HGB 8.6 (L) 01/13/2022 03:45 AM    HCT 26.2 (L) 01/13/2022 03:45 AM    PLATELET 238 25/19/8874 03:45 AM    MCV 98.9 01/13/2022 03:45 AM         Lab Results   Component Value Date/Time    Sodium 136 01/13/2022 03:45 AM    Potassium 4.2 01/13/2022 03:45 AM    Chloride 103 01/13/2022 03:45 AM    CO2 25 01/13/2022 03:45 AM    Anion gap 8 01/13/2022 03:45 AM    Glucose 112 (H) 01/13/2022 03:45 AM    BUN 52 (H) 01/13/2022 03:45 AM    Creatinine 1.97 (H) 01/13/2022 03:45 AM    BUN/Creatinine ratio 26 (H) 01/13/2022 03:45 AM    GFR est AA 30 (L) 01/13/2022 03:45 AM    GFR est non-AA 24 (L) 01/13/2022 03:45 AM    Calcium 9.3 01/13/2022 03:45 AM         Lab Results   Component Value Date/Time     (H) 06/08/2014 04:12 AM     (H) 06/04/2014 04:44 AM    NT pro-BNP 21,802 (H) 01/10/2022 02:34 AM    NT pro-BNP 20,345 (H) 01/08/2022 02:41 AM    NT pro-BNP 13,944 (H) 01/03/2022 03:44 AM    NT pro-BNP 13,027 (H) 01/01/2022 05:33 AM    NT pro-BNP 17,648 (H) 12/30/2021 08:45 AM         Lab Results   Component Value Date/Time    Cholesterol, total 148 09/23/2020 04:27 AM    HDL Cholesterol 52 09/23/2020 04:27 AM    LDL, calculated 83.8 09/23/2020 04:27 AM    VLDL, calculated 12.2 09/23/2020 04:27 AM    Triglyceride 61 09/23/2020 04:27 AM    CHOL/HDL Ratio 2.8 09/23/2020 04:27 AM         Lab Results   Component Value Date/Time    ALT (SGPT) 15 01/07/2022 08:31 PM    Alk.  phosphatase 101 01/07/2022 08:31 PM    Bilirubin, total 0.5 01/07/2022 08:31 PM

## 2022-01-13 NOTE — PROGRESS NOTES
Transition of Care  1. RUR 28% High  2. Disposition [place]-pending bed availability   3. DME NA  4. Transportation BLS/ [facility] phone [phone] on WILL CALL  5. Follow Up PCP, Specialist      Patient is planned for DC tomorrow. CM contacted [employer] to initiate auth [phone]. CM to fax supporting medicals to [phone]. Ref# [id number]. CM left voice message for [facility] [phone]. CM to monitor. Elizabeth Lewis, MS    2:15 CM staff to follow up with Cristian Diggs tomorrow [phone] regarding bed availability for patient. Approval for auth received, [id number]. Patient approved for 5 days, with start date of 1/14/22. Next review date is 1/18/22. Assigned care coordinator is Kathrine Suarez. Fax updated notes and clinicals to [phone] for continued stay request. CM updated [facility] transport to WILL CALL. CM to monitor.     Elizabeth Lewis, MS

## 2022-01-13 NOTE — PROGRESS NOTES
f  [address] [facility] Adult  Hospitalist Group                                                                                          Hospitalist Progress Note  Ines Littlejohn MD  Answering service: [phone] from in house phone        Date of Service:  2022  NAME:  Damari Santos  :  1940  MRN:  [id number]      Admission Summary:   aDmari Santos is a [de-identified] y.o. female with hx of afib on eliquis, htn, ckd iv, cad s/p cabg, cva, mdd w/ jas, gerd, parkinson who presents to hospital with complaints of sob. Patient was recently just discharged from hospital after admission for acute and chronic respiratory failure with hypoxia deemed secondary to CHF. Patient was discharged on 2 L of oxygen. She is seen and examined at bedside. States she has felt persistently short of breath over the last 24 hours. She denies any fever, chills, chest or abdominal pain, nausea, vomiting, cough, congestion, recent illness, palpitations, or dysuria. Per chart review, patient had been out of her baseline 2 L oxygen for an unknown amount of time. She was found hypoxic in low 80s and placed on 4 L nasal cannula. Patient was persistently hypoxic and tachypneic despite this and was therefore transferred to hospital for additional evaluation. Interval history / Subjective:     Feels well, ccb started by cardio, poor candidate for dig given renal fx      Assessment & Plan:     Shortness of breath secondary to acute CHF and exacerbation  Patient has diastolic CHF with previous hospitalization and now rehospitalization for continued shortness of breath  Cardiology on board   Bumex 1 mg BID on hold per cardio  -ccb started, con with coreg   Daily weights  Strict I's and O's  Limited echo shows EF of 40 to 45% with left ventricular size normal and mild global hypokinesis.   Mc James ordered by Cardiology. - Neg for ischemic changes    - dig x 1  and BB incresed by card as well      Elevated troponin   not significant    Atrial fibrillation  Continue coreg and Cardizem       Hypertension  On hydralazine, diltiazem, metoprolol  Patient has been hypotensive so we have held her Bumex this afternoon. BP Readings from Last 1 Encounters:   01/13/22 104/71        GERD  Protonix    Parkinson's dementia/major depression with disorder/generalized anxiety  Carbidopa-levodopa    CKD stage IV  Kidney function improving with diuresis  Lab Results   Component Value Date/Time    Creatinine (POC) 1.6 (H) 02/24/2020 10:32 AM    Creatinine 2.16 (H) 01/11/2022 05:05 AM        Anemia:   - transfused 1/11;  NOAC held   - f/u lab,   - poor candidate for GI w/u  - stools occult blood,   - cont current PPI,   - f/u iron profile  - f/u counts     Holding Eliquis as patient's hemoglobin has dropped. Code status: DNR  DVT prophylaxis: eliquis    Care Plan discussed with: Patient/Family and Nurse  Anticipated Disposition: SNF/LTC and [facility]/Rehab  Anticipated Discharge: 24 hours to 48 hours     Hospital Problems  Date Reviewed: 1/6/2022          Codes Class Noted POA    CHF [facility]congestive [facility] failure) ([facility] ICD-10-CM: I50.9  ICD-9-CM: 428.0  1/8/2022 Unknown                Review of Systems:   A comprehensive review of systems was negative except for that written in the HPI. Vital Signs:    Last 24hrs VS reviewed since prior progress note. Most recent are:  Visit Vitals  /71   Pulse (!) 118   Temp 98.8 °F (37.1 °C)   Resp 16   Ht 5' 5\" (1.651 m)   Wt 76.4 kg (168 lb 6.9 oz)   SpO2 96%   BMI 28.03 kg/m²       No intake or output data in the 24 hours ending 01/13/22 1420     Physical Examination:     I had a face to face encounter with this patient and independently examined them on 1/13/2022 as outlined below:          Constitutional:  No acute distress, cooperative   ENT:  Oral mucosa moist, oropharynx benign. Resp:  CTA bilaterally but with decreased breath sounds bilaterally. No wheezing/rhonchi/rales.  No accessory muscle use     CV:  Irregular rhythm,    no murmurs, gallops, rubs    GI:  Soft, non distended, non tender. normoactive bowel sounds, no hepatosplenomegaly     Musculoskeletal:  No edema, warm, 2+ pulses throughout    Neurologic:  Moves all extremities. AAOx self/hospital weak on other orientation questions , CN II-XII reviewed            Data Review:    Review and/or order of clinical lab test  Review and/or order of tests in the radiology section of CPT  Review and/or order of tests in the medicine section of CPT      Labs:     Recent Labs     01/13/22  0345 01/11/22  1737 01/11/22  0505 01/11/22  0505   WBC 6.4  --   --  4.5   HGB 8.6* 8.4*   < > 6.7*   HCT 26.2* 25.9*   < > 21.4*     --   --  218    < > = values in this interval not displayed. Recent Labs     01/13/22 0345 01/11/22  0505    132*   K 4.2 4.0    100   CO2 25 24   BUN 52* 55*   CREA 1.97* 2.16*   * 114*   CA 9.3 8.5     No results for input(s): ALT, AP, TBIL, TBILI, TP, ALB, GLOB, GGT, AML, LPSE in the last 72 hours. No lab exists for component: SGOT, GPT, AMYP, HLPSE  No results for input(s): INR, PTP, APTT, INREXT, INREXT in the last 72 hours. Recent Labs     01/13/22 0345   TIBC 263   PSAT 17*      Lab Results   Component Value Date/Time    Folate 7.8 12/14/2021 04:40 AM      No results for input(s): PH, PCO2, PO2 in the last 72 hours. No results for input(s): CPK, CKNDX, TROIQ in the last 72 hours.     No lab exists for component: CPKMB  Lab Results   Component Value Date/Time    Cholesterol, total 148 09/23/2020 04:27 AM    HDL Cholesterol 52 09/23/2020 04:27 AM    LDL, calculated 83.8 09/23/2020 04:27 AM    Triglyceride 61 09/23/2020 04:27 AM    CHOL/HDL Ratio 2.8 09/23/2020 04:27 AM     Lab Results   Component Value Date/Time    Glucose (POC) 108 12/21/2021 06:15 AM    Glucose (POC) 148 (H) 12/20/2021 09:54 PM    Glucose (POC) 156 (H) 12/18/2021 11:53 AM    Glucose (POC) 96 12/18/2021 10:18 AM    Glucose (POC) 120 (H) 04/24/2021 04:54 PM     Lab Results   Component Value Date/Time    Color YELLOW/STRAW 12/30/2021 08:23 PM    Appearance CLOUDY (A) 12/30/2021 08:23 PM    Specific gravity 1.017 12/30/2021 08:23 PM    pH (UA) 6.0 12/30/2021 08:23 PM    Protein 100 (A) 12/30/2021 08:23 PM    Glucose Negative 12/30/2021 08:23 PM    Ketone Negative 12/30/2021 08:23 PM    Bilirubin Negative 12/30/2021 08:23 PM    Urobilinogen 1.0 12/30/2021 08:23 PM    Nitrites Negative 12/30/2021 08:23 PM    Leukocyte Esterase MODERATE (A) 12/30/2021 08:23 PM    Epithelial cells FEW 12/30/2021 08:23 PM    Bacteria 1+ (A) 12/30/2021 08:23 PM    WBC 0-4 12/30/2021 08:23 PM    RBC 0-5 12/30/2021 08:23 PM         Medications Reviewed:     Current Facility-Administered Medications   Medication Dose Route Frequency    dilTIAZem IR (CARDIZEM) tablet 30 mg  30 mg Oral TIDAC    hydrALAZINE (APRESOLINE) tablet 25 mg  25 mg Oral TID    carvediloL (COREG) tablet 25 mg  25 mg Oral BID WITH MEALS    0.9% sodium chloride infusion 250 mL  250 mL IntraVENous PRN    [Held by provider] apixaban (ELIQUIS) tablet 2.5 mg  2.5 mg Oral BID    ARIPiprazole (ABILIFY) tablet 20 mg  20 mg Oral DAILY    atorvastatin (LIPITOR) tablet 40 mg  40 mg Oral QHS    busPIRone (BUSPAR) tablet 5 mg  5 mg Oral TID    carbidopa-levodopa (SINEMET)  mg per tablet 2 Tablet  2 Tablet Oral QID    cilostazoL (PLETAL) tablet 100 mg  100 mg Oral ACB&D    clopidogreL (PLAVIX) tablet 75 mg  75 mg Oral DAILY AFTER BREAKFAST    DULoxetine (CYMBALTA) capsule 120 mg  120 mg Oral DAILY    gabapentin (NEURONTIN) capsule 100 mg  100 mg Oral QHS    melatonin tablet 6 mg  6 mg Oral QHS    pantoprazole (PROTONIX) tablet 40 mg  40 mg Oral ACB    sodium chloride (NS) flush 5-40 mL  5-40 mL IntraVENous Q8H    sodium chloride (NS) flush 5-40 mL  5-40 mL IntraVENous PRN    acetaminophen (TYLENOL) tablet 650 mg  650 mg Oral Q6H PRN    Or    acetaminophen (TYLENOL) suppository 650 mg  650 mg Rectal Q6H PRN    polyethylene glycol (MIRALAX) packet 17 g  17 g Oral DAILY PRN    ondansetron (ZOFRAN ODT) tablet 4 mg  4 mg Oral Q8H PRN    Or    ondansetron (ZOFRAN) injection 4 mg  4 mg IntraVENous Q6H PRN    [Held by provider] bumetanide (BUMEX) injection 1 mg  1 mg IntraVENous Q12H     ______________________________________________________________________  EXPECTED LENGTH OF STAY: 3d 19h  ACTUAL LENGTH OF STAY:          Jazmyn Baxter MD

## 2022-01-14 VITALS
HEIGHT: 65 IN | OXYGEN SATURATION: 94 % | BODY MASS INDEX: 27.58 KG/M2 | DIASTOLIC BLOOD PRESSURE: 80 MMHG | RESPIRATION RATE: 19 BRPM | TEMPERATURE: 97.5 F | HEART RATE: 80 BPM | WEIGHT: 165.57 LBS | SYSTOLIC BLOOD PRESSURE: 130 MMHG

## 2022-01-14 LAB
ANION GAP SERPL CALC-SCNC: 5 MMOL/L (ref 5–15)
BASOPHILS # BLD: 0 K/UL (ref 0–0.1)
BASOPHILS NFR BLD: 1 % (ref 0–1)
BUN SERPL-MCNC: 43 MG/DL (ref 6–20)
BUN/CREAT SERPL: 25 (ref 12–20)
CALCIUM SERPL-MCNC: 9.1 MG/DL (ref 8.5–10.1)
CHLORIDE SERPL-SCNC: 106 MMOL/L (ref 97–108)
CO2 SERPL-SCNC: 26 MMOL/L (ref 21–32)
COVID-19 RAPID TEST, COVR: NOT DETECTED
CREAT SERPL-MCNC: 1.69 MG/DL (ref 0.55–1.02)
DIFFERENTIAL METHOD BLD: ABNORMAL
EOSINOPHIL # BLD: 0.3 K/UL (ref 0–0.4)
EOSINOPHIL NFR BLD: 7 % (ref 0–7)
ERYTHROCYTE [DISTWIDTH] IN BLOOD BY AUTOMATED COUNT: 13.5 % (ref 11.5–14.5)
GLUCOSE SERPL-MCNC: 85 MG/DL (ref 65–100)
HCT VFR BLD AUTO: 24.6 % (ref 35–47)
HGB BLD-MCNC: 7.7 G/DL (ref 11.5–16)
IMM GRANULOCYTES # BLD AUTO: 0 K/UL (ref 0–0.04)
IMM GRANULOCYTES NFR BLD AUTO: 0 % (ref 0–0.5)
LYMPHOCYTES # BLD: 0.8 K/UL (ref 0.8–3.5)
LYMPHOCYTES NFR BLD: 20 % (ref 12–49)
MCH RBC QN AUTO: 32.1 PG (ref 26–34)
MCHC RBC AUTO-ENTMCNC: 31.3 G/DL (ref 30–36.5)
MCV RBC AUTO: 102.5 FL (ref 80–99)
MONOCYTES # BLD: 0.5 K/UL (ref 0–1)
MONOCYTES NFR BLD: 12 % (ref 5–13)
NEUTS SEG # BLD: 2.2 K/UL (ref 1.8–8)
NEUTS SEG NFR BLD: 60 % (ref 32–75)
NRBC # BLD: 0 K/UL (ref 0–0.01)
NRBC BLD-RTO: 0 PER 100 WBC
PLATELET # BLD AUTO: 211 K/UL (ref 150–400)
PMV BLD AUTO: 8.8 FL (ref 8.9–12.9)
POTASSIUM SERPL-SCNC: 4.1 MMOL/L (ref 3.5–5.1)
RBC # BLD AUTO: 2.4 M/UL (ref 3.8–5.2)
RBC MORPH BLD: ABNORMAL
RBC MORPH BLD: ABNORMAL
SODIUM SERPL-SCNC: 137 MMOL/L (ref 136–145)
SOURCE, COVRS: NORMAL
WBC # BLD AUTO: 3.8 K/UL (ref 3.6–11)

## 2022-01-14 PROCEDURE — 99232 SBSQ HOSP IP/OBS MODERATE 35: CPT | Performed by: INTERNAL MEDICINE

## 2022-01-14 PROCEDURE — 74011250637 HC RX REV CODE- 250/637: Performed by: STUDENT IN AN ORGANIZED HEALTH CARE EDUCATION/TRAINING PROGRAM

## 2022-01-14 PROCEDURE — 74011000250 HC RX REV CODE- 250: Performed by: STUDENT IN AN ORGANIZED HEALTH CARE EDUCATION/TRAINING PROGRAM

## 2022-01-14 PROCEDURE — 36415 COLL VENOUS BLD VENIPUNCTURE: CPT

## 2022-01-14 PROCEDURE — 87635 SARS-COV-2 COVID-19 AMP PRB: CPT

## 2022-01-14 PROCEDURE — 74011250637 HC RX REV CODE- 250/637: Performed by: SPECIALIST

## 2022-01-14 PROCEDURE — 85025 COMPLETE CBC W/AUTO DIFF WBC: CPT

## 2022-01-14 PROCEDURE — 80048 BASIC METABOLIC PNL TOTAL CA: CPT

## 2022-01-14 RX ORDER — DILTIAZEM HYDROCHLORIDE 30 MG/1
30 TABLET, FILM COATED ORAL
Qty: 90 TABLET | Refills: 0 | OUTPATIENT
Start: 2022-01-14 | End: 2022-01-14 | Stop reason: SDUPTHER

## 2022-01-14 RX ORDER — CARVEDILOL 25 MG/1
25 TABLET ORAL 2 TIMES DAILY WITH MEALS
Qty: 60 TABLET | Refills: 0 | Status: SHIPPED
Start: 2022-01-14 | End: 2022-02-11 | Stop reason: DRUGHIGH

## 2022-01-14 RX ORDER — CARVEDILOL 25 MG/1
25 TABLET ORAL 2 TIMES DAILY WITH MEALS
Qty: 60 TABLET | Refills: 0 | OUTPATIENT
Start: 2022-01-14 | End: 2022-01-14 | Stop reason: SDUPTHER

## 2022-01-14 RX ORDER — DILTIAZEM HYDROCHLORIDE 30 MG/1
30 TABLET, FILM COATED ORAL
Qty: 90 TABLET | Refills: 0 | Status: SHIPPED
Start: 2022-01-14 | End: 2022-02-18

## 2022-01-14 RX ADMIN — CARBIDOPA AND LEVODOPA 2 TABLET: 25; 100 TABLET ORAL at 08:24

## 2022-01-14 RX ADMIN — BUSPIRONE HYDROCHLORIDE 5 MG: 5 TABLET ORAL at 17:16

## 2022-01-14 RX ADMIN — Medication 10 ML: at 13:01

## 2022-01-14 RX ADMIN — CILOSTAZOL 100 MG: 50 TABLET ORAL at 17:16

## 2022-01-14 RX ADMIN — HYDRALAZINE HYDROCHLORIDE 25 MG: 50 TABLET, FILM COATED ORAL at 08:24

## 2022-01-14 RX ADMIN — CILOSTAZOL 100 MG: 50 TABLET ORAL at 06:39

## 2022-01-14 RX ADMIN — DULOXETINE HYDROCHLORIDE 120 MG: 60 CAPSULE, DELAYED RELEASE ORAL at 08:24

## 2022-01-14 RX ADMIN — BUSPIRONE HYDROCHLORIDE 5 MG: 5 TABLET ORAL at 08:24

## 2022-01-14 RX ADMIN — CLOPIDOGREL 75 MG: 75 TABLET, FILM COATED ORAL at 08:24

## 2022-01-14 RX ADMIN — DILTIAZEM HYDROCHLORIDE 30 MG: 30 TABLET, FILM COATED ORAL at 12:59

## 2022-01-14 RX ADMIN — ARIPIPRAZOLE 20 MG: 10 TABLET ORAL at 08:24

## 2022-01-14 RX ADMIN — DILTIAZEM HYDROCHLORIDE 30 MG: 30 TABLET, FILM COATED ORAL at 17:16

## 2022-01-14 RX ADMIN — Medication 10 ML: at 06:40

## 2022-01-14 RX ADMIN — HYDRALAZINE HYDROCHLORIDE 25 MG: 50 TABLET, FILM COATED ORAL at 17:16

## 2022-01-14 RX ADMIN — PANTOPRAZOLE SODIUM 40 MG: 40 TABLET, DELAYED RELEASE ORAL at 06:39

## 2022-01-14 RX ADMIN — CARVEDILOL 25 MG: 12.5 TABLET, FILM COATED ORAL at 08:24

## 2022-01-14 RX ADMIN — CARBIDOPA AND LEVODOPA 2 TABLET: 25; 100 TABLET ORAL at 12:59

## 2022-01-14 RX ADMIN — DILTIAZEM HYDROCHLORIDE 30 MG: 30 TABLET, FILM COATED ORAL at 06:39

## 2022-01-14 RX ADMIN — CARVEDILOL 25 MG: 12.5 TABLET, FILM COATED ORAL at 17:16

## 2022-01-14 RX ADMIN — CARBIDOPA AND LEVODOPA 2 TABLET: 25; 100 TABLET ORAL at 17:16

## 2022-01-14 NOTE — PROGRESS NOTES
[address] [facility],4Th Floor            [facility] [place]               Division of Cardiology  [phone]                       Progress note                    NAME:  Tran Cheatham   :   1940   MRN:   [id number]         ICD-10-CM ICD-9-CM    1. Acute on chronic respiratory failure with hypoxia (HCC)  J96.21 518.84      799.02    2. Acute on chronic congestive heart failure, unspecified heart failure type (HCC)  I50.9 428.0    3. Systolic congestive heart failure, unspecified HF chronicity (HCC)  I50.20 428.20      428.0    4. Chronic atrial fibrillation (HCC)  I48.20 427.31                  HPI  [de-identified]years old female with past medical history remarkable for hypertension, atrial fibrillation, coronary disease status post CABG.  Status post PCI of native LAD in 2021.  She does have severe three-vessel disease with a patent LIMA to the LAD and a patent graft sequential to the first diagonal obtuse marginal branch.     Also history of diastolic dysfunction and fluctuating ejection fraction.     pci of native LAD on      Recently discharged from hospital following admission for shortness of breath she presents again with occurrence of shortness of breath.      To be noted the patient was previously considered for resynchronization therapy but it was felt that ejection fraction had improved and left bundle branch block was not as wide as before.     To be noted that she has undergone a renal angiogram in the past which demonstrated moderate renal arterial disease     No specific complaints. No chest pain. Breathing ok. HR controlled      Assessment/Plan:1. CMP: EF 40-45% no ischemia by recent nuclear stress test  She seems clinically compensated    Continue coreg and hydralazine    Continue off bumex    2. Atrial fibrillation: She remains in atrial fibrillation. HR controlled after increased BB. Currently off Eliquis on account of anemia and guaiac positive stools.  Outpt follow up with Dr. Lisa Paulino. No additional cardiac recs at this time and will be available prn.    3.  Anemia: As per primary team.           CARDIAC STUDIES      01/07/22    ECHO ADULT FOLLOW-UP OR LIMITED 01/09/2022 1/9/2022    Interpretation Summary    Left Ventricle: Left ventricle size is normal. Mildly increased wall thickness. Mild global hypokinesis present. Mildly reduced left ventricular systolic function with a visually estimated EF of 40 - 45%.   Mitral Valve: Moderate posterior mitral annular calcification.   Pericardium: Left pleural effusion.   Contrast used: Definity. Signed by: Thomas Morales MD on 1/9/2022  2:52 PM      01/07/22    NUCLEAR CARDIAC STRESS TEST 01/11/2022, 01/11/2022 1/11/2022    Interpretation Summary    ECG: Resting ECG demonstrates atrial fibrillation and left bundle branch block.   ECG: Stress ECG was not diagnostic due to left bundle branch block.   Stress Test: A pharmacological stress test was performed using regadenoson. Blood pressure demonstrated a normal response and heart rate demonstrated a normal response to stress. INDICATION: Shortness of breath, dyspnea. History of CABG, hypertension, coronary artery disease, diabetes    COMPARISON:  None. CORRELATIVE IMAGING STUDIES:  None    TRACER: Tc 99m Sestamibi    TECHNIQUE:  Resting SPECT images of the heart were obtained following the uneventful intravenous administration of 11.0 mCi of Tc 99m Sestamibi. Gated stress SPECT images of the heart were obtained following Lexiscan protocol and the uneventful intravenous administration of 33.0 mCi of Tc 99m Sestamibi. FINDINGS:  The rest and stress perfusion images demonstrate mild left ventricular dilatation. Prominent diaphragmatic attenuation is noted without significant perfusion defect or evidence of myocardial reversibility. The gated images demonstrate global hypokinesia and slight septal dyskinesia.  Left ventricular ejection fraction is 37 %.    Impression:  Mild left ventricular dilatation. No evidence of myocardial ischemia or infarction. Left ventricular ejection fraction is 37 %. Global hypokinesia and slight septal dyskinesia. Signed by: Daljit Nelson MD on 1/11/2022 12:41 PM, Signed by: Lexi Call MD on 1/11/2022  1:55 PM      03/29/21    INVASIVE VASCULAR PROCEDURE 04/05/2021 4/5/2021  CARDIAC PROCEDURE 04/05/2021 4/5/2021    Conclusion  Results: After angiography 60 to 70% stenosis of ostium of right renal artery was noted. On IVUS imaging the minimal luminal dimensions were 4 x 2.1 mm with MLA of 8 mm². Left renal angiogram demonstrated 50 to 60% angiographic stenosis which on IVUS appeared to be even less prominent with minimal luminal dimensions of 4.2 x 3.7 mm. Conclusion: Moderate to severe left renal artery stenosis and mild to moderate right renal arterial stenosis. Both stenotic lesions are ostial and atherosclerotic in etiology. Signed by: Tony Ray MD on 4/5/2021 10:00 AM       @[email or web address]      IMAGING      CT Results (most recent):  Results from Hospital Encounter encounter on 10/29/21    CT ABD PELV WO CONT    Narrative  EXAM: CT ABD PELV WO CONT    INDICATION: abd pain and tenderness, fevers    COMPARISON: 4/23/2021    CONTRAST:  None. TECHNIQUE:  Thin axial images were obtained through the abdomen and pelvis. Coronal and  sagittal reformats were generated. Oral contrast was not administered. CT dose  reduction was achieved through use of a standardized protocol tailored for this  examination and automatic exposure control for dose modulation. The absence of intravenous contrast material reduces the sensitivity for  evaluation of the vasculature and solid organs. FINDINGS:  LOWER THORAX: No significant abnormality in the incidentally imaged lower chest.  LIVER: No mass. BILIARY TREE: Gallstones are noted. CBD is not dilated. SPLEEN: within normal limits.   PANCREAS: No focal abnormality. ADRENALS: Unremarkable. KIDNEYS/URETERS: Unchanged bilateral renal cysts, no follow-up required. No  renal or ureteral stone or evidence of hydronephrosis. STOMACH: Unremarkable. SMALL BOWEL: No dilatation or wall thickening. COLON: No dilatation or wall thickening. APPENDIX: Not visualized. PERITONEUM: No ascites or pneumoperitoneum. RETROPERITONEUM: Unchanged 4.3 cm infrarenal abdominal aortic aneurysm. REPRODUCTIVE ORGANS: There is no pelvic mass or lymphadenopathy. URINARY BLADDER: No mass or calculus. BONES: Degenerative changes are seen in the lumbar spine. ABDOMINAL WALL: No mass or hernia. ADDITIONAL COMMENTS: N/A    Impression  Cholelithiasis. No acute abnormality. XR Results (most recent):  Results from Hospital Encounter encounter on 01/07/22    XR CHEST PORT    Narrative  Indication: Respiratory failure    Comparison: 1/2/2022    Portable exam of the chest obtained at 2105 demonstrates normal heart size. The  patient is status post median sternotomy. Diffuse interstitial infiltrates are  noted. There are small bilateral pleural effusions. Chronic rotator cuff tear is  noted on the left.     Impression  Small bilateral pleural effusions and diffuse bilateral interstitial  infiltrates most likely related to pulmonary edema       MRI Results (most recent):          Past Medical History:   Diagnosis Date    Anxiety disorder     Atrial fibrillation (HCC)     CAD (coronary artery disease) 2007    stents, CABG x 3v    Carotid stenosis     Cervical stenosis of spinal canal 07/2019    Chronic kidney disease     Cough     CVA (cerebral vascular accident) ([address] 07/2019    left lacunar infarct at head of caudate    Depression     AND CHRONIC ANXIETY    Diabetes ([address]     GERD (gastroesophageal reflux disease)     High cholesterol     History of peptic ulcer     Bleeding ulcer with increased NSAID use    Hypertension     Left carotid stenosis 07/2019    s/p left CEA with Dr. Jaqui Hall, old 2007    PUD (peptic ulcer disease)     Stroke [facility]     Tremor     Valvular heart disease            Past Surgical History:   Procedure Laterality Date    COLONOSCOPY N/A 12/17/2021    COLONOSCOPY   :- performed by Edgard Montilla MD at [facility] ENDOSCOPY    HX CAROTID ENDARTERECTOMY  07/2019    HX CORONARY ARTERY BYPASS GRAFT      HX TONSILLECTOMY  1963    CO CARDIAC SURG PROCEDURE UNLIST      CABG X3 VESSEL    CO TOTAL KNEE ARTHROPLASTY Right 2015               Visit Vitals  BP (!) 152/84 (BP 1 Location: Right upper arm, BP Patient Position: At rest)   Pulse 97   Temp 98.5 °F (36.9 °C)   Resp 19   Ht 5' 5\" (1.651 m)   Wt 165 lb 9.1 oz (75.1 kg)   SpO2 90%   BMI 27.55 kg/m²         Wt Readings from Last 3 Encounters:   01/14/22 165 lb 9.1 oz (75.1 kg)   01/06/22 164 lb 3.9 oz (74.5 kg)   12/18/21 173 lb 8 oz (78.7 kg)         Review of Systems:   Pertinent items are noted in the History of Present Illness. 01/14 0701 - 01/14 1900  In: 354 [P.O.:354]  Out: -     01/12 1901 - 01/14 0700  In: 222 [P.O.:222]  Out: 700 [Urine:700]      Telemetry: AFIB    Physical Exam:    Neck: no JVD  Heart: irregularly irregular rhythm  Lungs: clear to auscultation bilaterally  Abdomen: soft, non-tender.  Bowel sounds normal. No masses,  no organomegaly  Extremities: no edema    Current Facility-Administered Medications   Medication Dose Route Frequency    dilTIAZem IR (CARDIZEM) tablet 30 mg  30 mg Oral TIDAC    polyvinyl alcohol-povidone (NATURAL TEARS) 0.5-0.6 % ophthalmic solution 1 Drop  1 Drop Both Eyes PRN    hydrALAZINE (APRESOLINE) tablet 25 mg  25 mg Oral TID    carvediloL (COREG) tablet 25 mg  25 mg Oral BID WITH MEALS    0.9% sodium chloride infusion 250 mL  250 mL IntraVENous PRN    [Held by provider] apixaban (ELIQUIS) tablet 2.5 mg  2.5 mg Oral BID    ARIPiprazole (ABILIFY) tablet 20 mg  20 mg Oral DAILY    atorvastatin (LIPITOR) tablet 40 mg  40 mg Oral QHS    busPIRone (BUSPAR) tablet 5 mg  5 mg Oral TID    carbidopa-levodopa (SINEMET)  mg per tablet 2 Tablet  2 Tablet Oral QID    cilostazoL (PLETAL) tablet 100 mg  100 mg Oral ACB&D    clopidogreL (PLAVIX) tablet 75 mg  75 mg Oral DAILY AFTER BREAKFAST    DULoxetine (CYMBALTA) capsule 120 mg  120 mg Oral DAILY    gabapentin (NEURONTIN) capsule 100 mg  100 mg Oral QHS    melatonin tablet 6 mg  6 mg Oral QHS    pantoprazole (PROTONIX) tablet 40 mg  40 mg Oral ACB    sodium chloride (NS) flush 5-40 mL  5-40 mL IntraVENous Q8H    sodium chloride (NS) flush 5-40 mL  5-40 mL IntraVENous PRN    acetaminophen (TYLENOL) tablet 650 mg  650 mg Oral Q6H PRN    Or    acetaminophen (TYLENOL) suppository 650 mg  650 mg Rectal Q6H PRN    polyethylene glycol (MIRALAX) packet 17 g  17 g Oral DAILY PRN    ondansetron (ZOFRAN ODT) tablet 4 mg  4 mg Oral Q8H PRN    Or    ondansetron (ZOFRAN) injection 4 mg  4 mg IntraVENous Q6H PRN    [Held by provider] bumetanide (BUMEX) injection 1 mg  1 mg IntraVENous Q12H         All Cardiac Markers in the last 24 hours:  No results found for: CPK, CK, CKMMB, CKMB, RCK3, CKMBT, CKMBPOC, CKNDX, CKND1, MONSTER, TROPT, TROIQ, ANABELLE, TROPT, TNIPOC, BNP, BNPP, BNPNT     Lab Results   Component Value Date/Time    Creatinine (POC) 1.6 (H) 02/24/2020 10:32 AM    Creatinine 1.69 (H) 01/14/2022 04:22 AM          Lab Results   Component Value Date/Time    CK 75 07/11/2019 09:35 AM    CK - MB 2.7 07/11/2019 09:35 AM    CK-MB Index 3.6 (H) 07/11/2019 09:35 AM    Troponin-I, Qt. 0.05 (H) 04/24/2021 04:54 AM     (H) 06/08/2014 04:12 AM         Lab Results   Component Value Date/Time    WBC 3.8 01/14/2022 04:22 AM    HGB 7.7 (L) 01/14/2022 04:22 AM    HCT 24.6 (L) 01/14/2022 04:22 AM    PLATELET 379 44/84/7874 04:22 AM    .5 (H) 01/14/2022 04:22 AM         Lab Results   Component Value Date/Time    Sodium 137 01/14/2022 04:22 AM    Potassium 4.1 01/14/2022 04:22 AM    Chloride 106 01/14/2022 04:22 AM    CO2 26 01/14/2022 04:22 AM    Anion gap 5 01/14/2022 04:22 AM    Glucose 85 01/14/2022 04:22 AM    BUN 43 (H) 01/14/2022 04:22 AM    Creatinine 1.69 (H) 01/14/2022 04:22 AM    BUN/Creatinine ratio 25 (H) 01/14/2022 04:22 AM    GFR est AA 35 (L) 01/14/2022 04:22 AM    GFR est non-AA 29 (L) 01/14/2022 04:22 AM    Calcium 9.1 01/14/2022 04:22 AM         Lab Results   Component Value Date/Time     (H) 06/08/2014 04:12 AM     (H) 06/04/2014 04:44 AM    NT pro-BNP 21,802 (H) 01/10/2022 02:34 AM    NT pro-BNP 20,345 (H) 01/08/2022 02:41 AM    NT pro-BNP 13,944 (H) 01/03/2022 03:44 AM    NT pro-BNP 13,027 (H) 01/01/2022 05:33 AM    NT pro-BNP 17,648 (H) 12/30/2021 08:45 AM         Lab Results   Component Value Date/Time    Cholesterol, total 148 09/23/2020 04:27 AM    HDL Cholesterol 52 09/23/2020 04:27 AM    LDL, calculated 83.8 09/23/2020 04:27 AM    VLDL, calculated 12.2 09/23/2020 04:27 AM    Triglyceride 61 09/23/2020 04:27 AM    CHOL/HDL Ratio 2.8 09/23/2020 04:27 AM         Lab Results   Component Value Date/Time    ALT (SGPT) 15 01/07/2022 08:31 PM    Alk.  phosphatase 101 01/07/2022 08:31 PM    Bilirubin, total 0.5 01/07/2022 08:31 PM

## 2022-01-14 NOTE — PROGRESS NOTES
I have reviewed discharge instructions with the patient and Nurse Trupti Swanson via telephone from [facility]. The patient and Trupti Swanson via telephone from [employer] verbalized understanding.

## 2022-01-14 NOTE — DISCHARGE SUMMARY
Discharge Summary       PATIENT ID: Norma Gonzalez  MRN: [id number]   YOB: 1940    DATE OF ADMISSION: 1/7/2022  8:18 PM    DATE OF DISCHARGE: 01/14/22   PRIMARY CARE PROVIDER: Patricio Metcalf DO     ATTENDING PHYSICIAN: Javon Buchanan MD  DISCHARGING PROVIDER: Javon Buchanan MD    To contact this individual call [phone] and ask the  to page. If unavailable ask to be transferred the Adult Hospitalist Department. CONSULTATIONS: IP CONSULT TO CARDIOLOGY    PROCEDURES/SURGERIES: * No surgery found *    ADMITTING DIAGNOSES & HOSPITAL COURSE:   HPI: \"Silvana Ramos is a [de-identified] y.o. female with hx of afib on eliquis, htn, ckd iv, cad s/p cabg, cva, mdd w/ jas, gerd, parkinson who presents to hospital with complaints of sob.  Patient was recently just discharged from hospital after admission for acute and chronic respiratory failure with hypoxia deemed secondary to CHF.  Patient was discharged on 2 L of oxygen.  She is seen and examined at bedside.  States she has felt persistently short of breath over the last 24 hours.  She denies any fever, chills, chest or abdominal pain, nausea, vomiting, cough, congestion, recent illness, palpitations, or dysuria. Per chart review, patient had been out of her baseline 2 L oxygen for an unknown amount of time.  She was found hypoxic in low 80s and placed on 4 L nasal cannula.  Patient was persistently hypoxic and tachypneic despite this and was therefore transferred to hospital for additional evaluation. \"      Patient was evaluated for shortness of breath secondary to acute on chronic HFpEF. Cardiology evaluated patient, echo was done showed stable EF. Marycarmen [place] was also done which was negative for ischemic changes. Medications were adjusted by increasing beta-blocker dose and given 1x dose of dig. Bumex held, renal function improved. Cardizem dose was also adjusted due to persistent tachycardia.  Spoke to cardiology today, d/c with coreg and cardizem dose adjusted and hld bumex on d/c.  Eliquis also held on account of anemia positive FOBT poor canddiate for scope and Hgb stayed stable post 1 U PRBC . Outpatient f/u with cardio, pcp on d.c. D/c to snf. DISCHARGE DIAGNOSES / PLAN:      Shortness of breath secondary to acute CHF and exacerbation  Patient has diastolic CHF with previous hospitalization and now rehospitalization for continued shortness of breath  Cardiology on board   Bumex 1 mg BID on hold per cardio  -ccb started, con with coreg   Daily weights  Strict I's and O's  Limited echo shows EF of 40 to 45% with left ventricular size normal and mild global hypokinesis. Neyda Curet ordered by Cardiology. - Neg for ischemic changes    - dig x 1 1/12 and BB incresed by card as well 1/12      Elevated troponin   not significant     Atrial fibrillation  Continue coreg and Cardizem         Hypertension  On hydralazine, diltiazem, metoprolol  Patient has been hypotensive so we have held her Bumex this afternoon.       BP Readings from Last 1 Encounters:   01/13/22 104/71         GERD  Protonix     Parkinson's dementia/major depression with disorder/generalized anxiety  Carbidopa-levodopa     CKD stage IV  Kidney function improving with diuresis        Lab Results   Component Value Date/Time     Creatinine (POC) 1.6 (H) 02/24/2020 10:32 AM     Creatinine 2.16 (H) 01/11/2022 05:05 AM         Anemia:   - transfused 1/11;  NOAC held   - f/u lab,   - poor candidate for GI w/u  - stools occult blood,   - cont current PPI,   - f/u iron profile wnl   -hgb stable      Holding Eliquis as patient's hemoglobin has dropped.     Code status: DNR  DVT prophylaxis: eliquis on hold      Care Plan discussed with: Patient/Family and Nurse  Anticipated Disposition: SNF         FOLLOW UP APPOINTMENTS:    Follow-up Information     Follow up With Specialties Details Why Contact Info    [address] [facility] [address] [address]  [address], [address]  [address] [facility] Dottie Vu MD Cardiology In 1 week  [address]  [address] [facility]      Meng Llanes DO Family Medicine In 3 days  [address]  [address] [address]  [phone]             [address]: rpt cbc and bmp 3-5 days     ACTIVITY: Activity as tolerated          DISCHARGE MEDICATIONS:  Current Discharge Medication List      START taking these medications    Details   dilTIAZem IR (CARDIZEM) 30 mg tablet Take 1 Tablet by mouth Before breakfast, lunch, and dinner for 30 days. Qty: 90 Tablet, Refills: 0  Start date: 1/14/2022, End date: 2/13/2022         CONTINUE these medications which have CHANGED    Details   carvediloL (COREG) 25 mg tablet Take 1 Tablet by mouth two (2) times daily (with meals) for 30 days. Qty: 60 Tablet, Refills: 0  Start date: 1/14/2022, End date: 2/13/2022         CONTINUE these medications which have NOT CHANGED    Details   ARIPiprazole (ABILIFY) 20 mg tablet Take 1 Tablet by mouth daily. Qty: 30 Tablet, Refills: 0  Start date: 1/7/2022      busPIRone (BUSPAR) 5 mg tablet Take 1 Tablet by mouth three (3) times daily. Qty: 90 Tablet, Refills: 0  Start date: 1/7/2022      DULoxetine (CYMBALTA) 60 mg capsule Take 2 Capsules by mouth daily. Indications: anxiousness associated with depression  Qty: 30 Capsule, Refills: 0  Start date: 1/7/2022      senna-docusate (Senna with Docusate Sodium) 8.6-50 mg per tablet Take 1 Tablet by mouth nightly. Qty: 30 Tablet, Refills: 0      polyethylene glycol (MIRALAX) 17 gram packet Take 1 Packet by mouth daily as needed for Constipation for up to 30 days. Qty: 30 Each, Refills: 0      ondansetron (ZOFRAN ODT) 4 mg disintegrating tablet Take 1 Tablet by mouth every eight (8) hours as needed for Nausea or Vomiting.   Qty: 30 Tablet, Refills: 0 cilostazoL (PLETAL) 100 mg tablet Take 100 mg by mouth Before breakfast and dinner. hydrALAZINE (APRESOLINE) 50 mg tablet Take 1 Tablet by mouth three (3) times daily. Qty: 90 Tablet, Refills: 5      atorvastatin (LIPITOR) 40 mg tablet Take 1 Tablet by mouth nightly. Qty: 30 Tablet, Refills: 5      gabapentin (NEURONTIN) 100 mg capsule Take 100 mg by mouth nightly. melatonin 3 mg tablet Take 6 mg by mouth nightly. nitroglycerin (Nitrostat) 0.4 mg SL tablet 0.4 mg by SubLINGual route every five (5) minutes as needed for Chest Pain. Up to 3 doses. clopidogreL (Plavix) 75 mg tab Take 75 mg by mouth daily (after breakfast). carbidopa-levodopa (SINEMET)  mg per tablet Take 2 Tabs by mouth four (4) times daily. Qty: 720 Tab, Refills: 1      acetaminophen (TYLENOL) 325 mg tablet Take 650 mg by mouth every six (6) hours as needed for Pain or Fever. cyanocobalamin (VITAMIN B12) 100 mcg tablet Take 100 mcg by mouth daily. Cholecalciferol, Vitamin D3, 1,000 unit cap Take 1,000 Units by mouth daily. pantoprazole (PROTONIX) 40 mg tablet Take 40 mg by mouth Daily (before breakfast). Indications: h/o bleeding ulcer with nsaid      multivitamins-minerals-lutein (CENTRUM SILVER) tab tablet Take 1 Tablet by mouth daily. STOP taking these medications       metoprolol succinate (TOPROL-XL) 100 mg tablet Comments:   Reason for Stopping:         bumetanide (BUMEX) 1 mg tablet Comments:   Reason for Stopping:         dilTIAZem ER (DILACOR XR) 120 mg capsule Comments:   Reason for Stopping:         apixaban (ELIQUIS) 2.5 mg tablet Comments:   Reason for Stopping:                 NOTIFY YOUR PHYSICIAN FOR ANY OF THE FOLLOWING:   Fever over 101 degrees for 24 hours. Chest pain, shortness of breath, fever, chills, nausea, vomiting, diarrhea, change in mentation, falling, weakness, bleeding. Severe pain or pain not relieved by medications.   Or, any other signs or symptoms that you may have questions about. DISPOSITION:    Home With:   OT  PT  HH  RN      x Long term SNF/Inpatient Rehab    Independent/assisted living    Hospice    Other:       PATIENT CONDITION AT DISCHARGE:     Functional status    Poor    x Deconditioned     Independent      Cognition     Lucid    x Forgetful     Dementia        Code status     Full code    x DNR      PHYSICAL EXAMINATION AT DISCHARGE:  I had a face to face encounter with this patient and independently examined them on 1/14/2022 as outlined below:                                                   Constitutional:  No acute distress, cooperative   ENT:  Oral mucosa moist, oropharynx benign. Resp:  CTA bilaterally but with decreased breath sounds bilaterally. No wheezing/rhonchi/rales. No accessory muscle use      CV:  Irregular rhythm,    no murmurs, gallops, rubs    GI:  Soft, non distended, non tender. normoactive bowel sounds, no hepatosplenomegaly     Musculoskeletal:  No edema, warm, 2+ pulses throughout    Neurologic:  Moves all extremities.   AAOx self/hospital weak on other orientation questions , CN II-XII reviewed           CHRONIC MEDICAL DIAGNOSES:  Problem List as of 1/14/2022 Date Reviewed: 1/6/2022          Codes Class Noted - Resolved    CHF (congestive heart failure) ([facility] ICD-10-CM: I50.9  ICD-9-CM: 428.0  1/8/2022 - Present        CAP (community acquired pneumonia) ICD-10-CM: J18.9  ICD-9-CM: 486  12/10/2021 - Present        Acute on chronic respiratory failure with hypoxia ([facility] ICD-10-CM: J96.21  ICD-9-CM: 518.84, 799.02  12/10/2021 - Present        Cellulitis and abscess of lower extremity ICD-10-CM: L03.119, L02.419  ICD-9-CM: 682.6  10/29/2021 - Present        SOB (shortness of breath) ICD-10-CM: R06.02  ICD-9-CM: 786.05  4/23/2021 - Present        UTI (urinary tract infection) ICD-10-CM: N39.0  ICD-9-CM: 599.0  4/9/2021 - Present        Weakness ICD-10-CM: R53.1  ICD-9-CM: 780.79  5/4/2020 - Present        Generalized weakness ICD-10-CM: R53.1  ICD-9-CM: 780.79  4/30/2020 - Present        Carotid artery stenosis, symptomatic, left ICD-10-CM: J11.06  ICD-9-CM: 433.10  7/26/2019 - Present        HTN (hypertension) ICD-10-CM: I10  ICD-9-CM: 401.9  7/17/2019 - Present        Acute ischemic stroke [facility]) ICD-10-CM: I63.9  ICD-9-CM: 434.91  7/12/2019 - Present        Fall ICD-10-CM: W19. Marie Fountain  ICD-9-CM: E888.9  12/24/2018 - Present        CKD (chronic kidney disease), stage III ([facility] ICD-10-CM: N18.30  ICD-9-CM: 585.3  7/8/2015 - Present        Edema ICD-10-CM: R60.9  ICD-9-CM: 782.3  5/6/2015 - Present        Diabetes mellitus ([facility] ICD-10-CM: E11.9  ICD-9-CM: 250.00  5/6/2015 - Present        Postoperative anemia due to acute blood loss ICD-10-CM: D62  ICD-9-CM: 285.1  2/14/2015 - Present        S/P CABG (coronary artery bypass graft) ICD-10-CM: Z95.1  ICD-9-CM: V45.81  2/13/2015 - Present        Syncope ICD-10-CM: R55  ICD-9-CM: 780.2  2/9/2015 - Present        Bradycardia ICD-10-CM: R00.1  ICD-9-CM: 427.89  2/9/2015 - Present        Acute kidney injury ([facility] ICD-10-CM: N17.9  ICD-9-CM: 584.9  2/9/2015 - Present        Anemia ICD-10-CM: D64.9  ICD-9-CM: 285.9  9/9/2014 - Present        GI bleed ICD-10-CM: K92.2  ICD-9-CM: 578.9  9/9/2014 - Present        AF (atrial fibrillation) ([facility]) ICD-10-CM: I48.91  ICD-9-CM: 427.31  6/20/2014 - Present        Hypotension ICD-10-CM: I95.9  ICD-9-CM: 458.9  6/3/2014 - Present        Coronary artery disease ICD-10-CM: I25.10  ICD-9-CM: 414.00  6/3/2014 - Present    Overview Signed 2/10/2015 11:06 AM by Kimmy Calvo     2007 PCI x2 to LAD with STEPHAN             S/P coronary artery stent placement ICD-10-CM: Z95.5  ICD-9-CM: V45.82  6/3/2014 - Present        Renal failure ICD-10-CM: N19  ICD-9-CM: 869  6/3/2014 - Present        Elevated troponin ICD-10-CM: R77.8  ICD-9-CM: 790.6  6/3/2014 - Present        Pericardial effusion ICD-10-CM: I31.3  ICD-9-CM: 423.9  6/3/2014 - Present        Lactic acidosis ICD-10-CM: E87.2  ICD-9-CM: 276.2  6/3/2014 - Present        RESOLVED: CHF exacerbation ([facility] ICD-10-CM: I50.9  ICD-9-CM: 428.0  9/25/2020 - 1/6/2022        RESOLVED: CHF (congestive heart failure) ([facility] ICD-10-CM: I50.9  ICD-9-CM: 428.0  9/22/2020 - 8/3/2021              Greater than 30 minutes were spent with the patient on counseling and coordination of care    Signed:   Monty Hackett MD  1/14/2022  2:17 PM

## 2022-01-14 NOTE — PROGRESS NOTES
Transition of Care Plan   RUR: 30%   Disposition: The disposition plan is SNF pending choice   Transport: [facility]    CM spoke with Darrell Harrison at [facility], she stated they had an outbreak last night and they are not accepting anyone currently. CM discussed with patient's emergency contact  Jackelyn Barajas who provided CM permission to send the referral to other facilities. Referrals sent to [facility], [address] [facility], [address] [facility], [address] [facility], [address] [facility]. 11:55 AM  Patient accepted by [address] [facility]. Spoke with Adam Bajwa who stated they will need a negative rapid today, attending notified for orders to be placed and nurse is aware. Call report [phone] patient going to room #600.       DANAE Patel

## 2022-01-14 NOTE — PROGRESS NOTES
Bedside and Verbal shift change report given to Eliezer Head (oncoming nurse) by Anibal Townsend RN (offgoing nurse). Report included the following information SBAR, Kardex, Intake/Output, MAR, Accordion and Recent Results.

## 2022-01-14 NOTE — DISCHARGE INSTRUCTIONS
Claudeen Fridge, MD   Physician   Internal Medicine   Discharge Summary       Signed   Date of Service:  01/14/22 1415                       []Gildardo copied text    []Asael for details       Discharge Summary         PATIENT ID: Frank Gonzales  MRN: [id number]   YOB: 1940    DATE OF ADMISSION: 1/7/2022  8:18 PM    DATE OF DISCHARGE: 01/14/22   PRIMARY CARE PROVIDER: Christine Stevens DO      ATTENDING PHYSICIAN: Joanne Kiran MD  DISCHARGING PROVIDER: Joanne Kiran MD    To contact this individual call [phone] and ask the  to page. If unavailable ask to be transferred the Adult Hospitalist Department.     CONSULTATIONS: IP CONSULT TO CARDIOLOGY     PROCEDURES/SURGERIES: * No surgery found *     ADMITTING [address] [place] [address] COURSE:   HPI: \"Silvana Ramos is a [de-identified] y.o. female with hx of afib on eliquis, htn, ckd iv, cad s/p cabg, cva, mdd w/ jas, gerd, parkinson who presents to hospital with complaints of sob.  Patient was recently just discharged from hospital after admission for acute and chronic respiratory failure with hypoxia deemed secondary to CHF.  Patient was discharged on 2 L of oxygen.  She is seen and examined at bedside.  States she has felt persistently short of breath over the last 24 hours.  She denies any fever, chills, chest or abdominal pain, nausea, vomiting, cough, congestion, recent illness, palpitations, or dysuria. Per chart review, patient had been out of her baseline 2 L oxygen for an unknown amount of time.  She was found hypoxic in low 80s and placed on 4 L nasal cannula.  Patient was persistently hypoxic and tachypneic despite this and was therefore transferred to hospital for additional evaluation. \"        Patient was evaluated for shortness of breath secondary to acute on chronic HFpEF. Cardiology evaluated patient, echo was done showed stable EF. Anette Belcher was also done which was negative for ischemic changes.   Medications were adjusted by increasing beta-blocker dose and given 1x dose of dig. Bumex held, renal function improved. Cardizem dose was also adjusted due to persistent tachycardia. Spoke to cardiology today, d/c with coreg and cardizem dose adjusted and hld bumex on d/c.  Eliquis also held on account of anemia positive FOBT poor canddiate for scope and Hgb stayed stable post 1 U PRBC . Outpatient f/u with cardio, pcp on d.c. D/c to snf.        DISCHARGE DIAGNOSES / PLAN:       Shortness of breath secondary to acute CHF and exacerbation  Patient has diastolic CHF with previous hospitalization and now rehospitalization for continued shortness of breath  Cardiology on board   Bumex 1 mg BID on hold per cardio  -ccb started, con with coreg   Daily weights  Strict I's and O's  Limited echo shows EF of 40 to 45% with left ventricular size normal and mild global hypokinesis.   Dago Greek ordered by Cardiology. - Neg for ischemic changes    - dig x 1 1/12 and BB incresed by card as well 1/12      Elevated troponin   not significant     Atrial fibrillation  Continue coreg and Cardizem         Hypertension  On hydralazine, diltiazem, metoprolol  Patient has been hypotensive so we have held her Bumex this afternoon.        BP Readings from Last 1 Encounters:   01/13/22 104/71         GERD  Protonix     Parkinson's dementia/major depression with disorder/generalized anxiety  Carbidopa-levodopa     CKD stage IV  Kidney function improving with diuresis            Lab Results   Component Value Date/Time     Creatinine (POC) 1.6 (H) 02/24/2020 10:32 AM     Creatinine 2.16 (H) 01/11/2022 05:05 AM         Anemia:   - transfused 1/11;  NOAC held   - f/u lab,   - poor candidate for GI w/u  - stools occult blood,   - cont current PPI,   - f/u iron profile wnl   -hgb stable      Holding Eliquis as patient's hemoglobin has dropped.     Code status: DNR  DVT prophylaxis: eliquis on hold      Care Plan discussed with: Patient/Family and Nurse  Anticipated Disposition: SNF            FOLLOW UP APPOINTMENTS:             Follow-up Information      Follow up With Specialties Details Why [address] [facility] [facility] [address] [facility],[address] [id number] [phone]     [address], [address] [address] [place]  Herlinda Brown [address] [address]  [phone]  Krystal Perez MD Cardiology In 1 week   [facility] [address]  [address] [address] [facility] 72 [phone]        Christina Block,  Family Medicine In 3 days   [address] [facility] [address]  [phone]                ADDITIONAL CARE RECOMMENDATIONS: rpt cbc and bmp 3-5 days      ACTIVITY: Activity as tolerated              DISCHARGE MEDICATIONS:        Current Discharge Medication List             START taking these medications     Details   dilTIAZem IR (CARDIZEM) 30 mg tablet Take 1 Tablet by mouth Before breakfast, lunch, and dinner for 30 days. Qty: 90 Tablet, Refills: 0  Start date: 1/14/2022, End date: 2/13/2022                 CONTINUE these medications which have CHANGED     Details   carvediloL (COREG) 25 mg tablet Take 1 Tablet by mouth two (2) times daily (with meals) for 30 days. Qty: 60 Tablet, Refills: 0  Start date: 1/14/2022, End date: 2/13/2022                 CONTINUE these medications which have NOT CHANGED     Details   ARIPiprazole (ABILIFY) 20 mg tablet Take 1 Tablet by mouth daily. Qty: 30 Tablet, Refills: 0  Start date: 1/7/2022       busPIRone (BUSPAR) 5 mg tablet Take 1 Tablet by mouth three (3) times daily. Qty: 90 Tablet, Refills: 0  Start date: 1/7/2022       DULoxetine (CYMBALTA) 60 mg capsule Take 2 Capsules by mouth daily. Indications: anxiousness associated with depression  Qty: 30 Capsule, Refills: 0  Start date: 1/7/2022       senna-docusate (Senna with Docusate Sodium) 8.6-50 mg per tablet Take 1 Tablet by mouth nightly.   Qty: 30 Tablet, Refills: 0       polyethylene glycol (MIRALAX) 17 gram packet Take 1 Packet by mouth daily as needed for Constipation for up to 30 days. Qty: 30 Each, Refills: 0       ondansetron (ZOFRAN ODT) 4 mg disintegrating tablet Take 1 Tablet by mouth every eight (8) hours as needed for Nausea or Vomiting. Qty: 30 Tablet, Refills: 0       cilostazoL (PLETAL) 100 mg tablet Take 100 mg by mouth Before breakfast and dinner.       hydrALAZINE (APRESOLINE) 50 mg tablet Take 1 Tablet by mouth three (3) times daily. Qty: 90 Tablet, Refills: 5       atorvastatin (LIPITOR) 40 mg tablet Take 1 Tablet by mouth nightly. Qty: 30 Tablet, Refills: 5       gabapentin (NEURONTIN) 100 mg capsule Take 100 mg by mouth nightly.       melatonin 3 mg tablet Take 6 mg by mouth nightly.       nitroglycerin (Nitrostat) 0.4 mg SL tablet 0.4 mg by SubLINGual route every five (5) minutes as needed for Chest Pain. Up to 3 doses.       clopidogreL (Plavix) 75 mg tab Take 75 mg by mouth daily (after breakfast).       carbidopa-levodopa (SINEMET)  mg per tablet Take 2 Tabs by mouth four (4) times daily. Qty: 720 Tab, Refills: 1       acetaminophen (TYLENOL) 325 mg tablet Take 650 mg by mouth every six (6) hours as needed for Pain or Fever.       cyanocobalamin (VITAMIN B12) 100 mcg tablet Take 100 mcg by mouth daily.       Cholecalciferol, Vitamin D3, 1,000 unit cap Take 1,000 Units by mouth daily.       pantoprazole (PROTONIX) 40 mg tablet Take 40 mg by mouth Daily (before breakfast).  Indications: h/o bleeding ulcer with nsaid       multivitamins-minerals-lutein (CENTRUM SILVER) tab tablet Take 1 Tablet by mouth daily.                STOP taking these medications         metoprolol succinate (TOPROL-XL) 100 mg tablet Comments:   Reason for Stopping:            bumetanide (BUMEX) 1 mg tablet Comments:   Reason for Stopping:            dilTIAZem ER (DILACOR XR) 120 mg capsule Comments:   Reason for Stopping:            apixaban (ELIQUIS) 2.5 mg tablet Comments:   Reason for Stopping:                     NOTIFY YOUR PHYSICIAN FOR ANY OF THE FOLLOWING:   Fever over 101 degrees for 24 hours. Chest pain, shortness of breath, fever, chills, nausea, vomiting, diarrhea, change in mentation, falling, weakness, bleeding. Severe pain or pain not relieved by medications. Or, any other signs or symptoms that you may have questions about.     DISPOSITION:    Home With:    OT   PT   HH   RN      x Long term SNF/Inpatient Rehab     Independent/assisted living     Hospice     Other:         PATIENT CONDITION AT DISCHARGE:      Functional status     Poor    x Deconditioned      Independent       Cognition     Lucid    x Forgetful      Dementia          Code status     Full code    x DNR       PHYSICAL EXAMINATION AT DISCHARGE:  I had a face to face encounter with this patient and independently examined them on 1/14/2022 as outlined below:                                                   Constitutional:  No acute distress, cooperative   ENT:  Oral mucosa moist, oropharynx benign. Resp:  CTA bilaterally but with decreased breath sounds bilaterally. No wheezing/rhonchi/rales. No accessory muscle use      CV:  Irregular rhythm,    no murmurs, gallops, rubs    GI:  Soft, non distended, non tender. normoactive bowel sounds, no hepatosplenomegaly     Musculoskeletal:  No edema, warm, 2+ pulses throughout    Neurologic:  Moves all extremities.   AAOx self/hospital weak on other orientation questions , CN II-XII reviewed               CHRONIC MEDICAL DIAGNOSES:             Problem List as of 1/14/2022 Date Reviewed: 1/6/2022           Codes Class Noted - Resolved     CHF (congestive heart failure) (HCC) ICD-10-CM: I50.9  ICD-9-CM: 428.0   1/8/2022 - Present           CAP (community acquired pneumonia) ICD-10-CM: J18.9  ICD-9-CM: 992   12/10/2021 - Present           Acute on chronic respiratory failure with hypoxia (HCC) ICD-10-CM: J96.21  ICD-9-CM: 518.84, 799.02   12/10/2021 - Present           Cellulitis and abscess of lower extremity ICD-10-CM: L03.119, L02.419  ICD-9-CM: 682. 6   10/29/2021 - Present           SOB (shortness of breath) ICD-10-CM: R06.02  ICD-9-CM: 786.05   4/23/2021 - Present           UTI (urinary tract infection) ICD-10-CM: N39.0  ICD-9-CM: 599.0   4/9/2021 - Present           Weakness ICD-10-CM: R53.1  ICD-9-CM: 780.79   5/4/2020 - Present           Generalized weakness ICD-10-CM: R53.1  ICD-9-CM: 780.79   4/30/2020 - Present           Carotid artery stenosis, symptomatic, left ICD-10-CM: U28.86  ICD-9-CM: 433.10   7/26/2019 - Present           HTN (hypertension) ICD-10-CM: I10  ICD-9-CM: 119. 9   7/17/2019 - Present           Acute ischemic stroke [facility]) ICD-10-CM: I63.9  ICD-9-CM: 434.91   7/12/2019 - Present           Fall ICD-10-CM: W19. Chintan Andersontcher  ICD-9-CM: F093. 9   12/24/2018 - Present           CKD (chronic kidney disease), stage III ([facility] ICD-10-CM: N18.30  ICD-9-CM: 269. 3   7/8/2015 - Present           Edema ICD-10-CM: R60.9  ICD-9-CM: 457. 3   5/6/2015 - Present           Diabetes mellitus ([facility] ICD-10-CM: E11.9  ICD-9-CM: 250.00   5/6/2015 - Present           Postoperative anemia due to acute blood loss ICD-10-CM: D62  ICD-9-CM: 285.1   2/14/2015 - Present           S/P CABG (coronary artery bypass graft) ICD-10-CM: Z95.1  ICD-9-CM: V45.81   2/13/2015 - Present           Syncope ICD-10-CM: R55  ICD-9-CM: 780. 2   2/9/2015 - Present           Bradycardia ICD-10-CM: R00.1  ICD-9-CM: 427.89   2/9/2015 - Present           Acute kidney injury ([facility] ICD-10-CM: N17.9  ICD-9-CM: 686. 9   2/9/2015 - Present           Anemia ICD-10-CM: D64.9  ICD-9-CM: 898. 9   9/9/2014 - Present           GI bleed ICD-10-CM: K92.2  ICD-9-CM: 795. 9   9/9/2014 - Present           AF (atrial fibrillation) (HCC) ICD-10-CM: I48.91  ICD-9-CM: 427.31   6/20/2014 - Present           Hypotension ICD-10-CM: I95.9  ICD-9-CM: 458. 9   6/3/2014 - Present           Coronary artery disease ICD-10-CM: I25.10  ICD-9-CM: 414.00   6/3/2014 - Present     Overview Signed 2/10/2015 11:06 AM by Prasanth Garcia       2007 PCI x2 to LAD with STEPHAN                 S/P coronary artery stent placement ICD-10-CM: Z95.5  ICD-9-CM: V45.82   6/3/2014 - Present           Renal failure ICD-10-CM: N19  ICD-9-CM: 586   6/3/2014 - Present           Elevated troponin ICD-10-CM: R77.8  ICD-9-CM: 790.6   6/3/2014 - Present           Pericardial effusion ICD-10-CM: I31.3  ICD-9-CM: 423. 9   6/3/2014 - Present           Lactic acidosis ICD-10-CM: E87.2  ICD-9-CM: 276.2   6/3/2014 - Present           RESOLVED: CHF exacerbation (HCC) ICD-10-CM: I50.9  ICD-9-CM: 428.0   9/25/2020 - 1/6/2022           RESOLVED: CHF (congestive heart failure) (HCC) ICD-10-CM: I50.9  ICD-9-CM: 428.0   9/22/2020 - 8/3/2021                   Greater than 30 minutes were spent with the patient on counseling and coordination of care     Signed:   Philip Small MD  1/14/2022  2:17 PM

## 2022-01-15 ENCOUNTER — HOSPITAL ENCOUNTER (INPATIENT)
Age: 82
LOS: 11 days | Discharge: NURSING, REHAB OR LONG-TERM CARE | DRG: 291 | End: 2022-01-27
Attending: EMERGENCY MEDICINE | Admitting: HOSPITALIST
Payer: MEDICARE

## 2022-01-15 ENCOUNTER — APPOINTMENT (OUTPATIENT)
Dept: GENERAL RADIOLOGY | Age: 82
DRG: 291 | End: 2022-01-15
Attending: EMERGENCY MEDICINE
Payer: MEDICARE

## 2022-01-15 DIAGNOSIS — R09.02 HYPOXIA: ICD-10-CM

## 2022-01-15 DIAGNOSIS — Z51.5 PALLIATIVE CARE ENCOUNTER: ICD-10-CM

## 2022-01-15 DIAGNOSIS — I48.91 ATRIAL FIBRILLATION WITH RVR (HCC): ICD-10-CM

## 2022-01-15 DIAGNOSIS — R53.81 PHYSICAL DEBILITY: ICD-10-CM

## 2022-01-15 DIAGNOSIS — R06.03 ACUTE RESPIRATORY DISTRESS: Primary | ICD-10-CM

## 2022-01-15 DIAGNOSIS — I48.91 ATRIAL FIBRILLATION, UNSPECIFIED TYPE (HCC): ICD-10-CM

## 2022-01-15 DIAGNOSIS — R06.02 SHORTNESS OF BREATH: ICD-10-CM

## 2022-01-15 DIAGNOSIS — J96.21 ACUTE ON CHRONIC RESPIRATORY FAILURE WITH HYPOXIA (HCC): ICD-10-CM

## 2022-01-15 DIAGNOSIS — I50.23 ACUTE ON CHRONIC SYSTOLIC CONGESTIVE HEART FAILURE (HCC): ICD-10-CM

## 2022-01-15 PROBLEM — I50.9 CHF EXACERBATION (HCC): Status: ACTIVE | Noted: 2022-01-15

## 2022-01-15 LAB
ALBUMIN SERPL-MCNC: 3.2 G/DL (ref 3.5–5)
ALBUMIN/GLOB SERPL: 1.1 {RATIO} (ref 1.1–2.2)
ALP SERPL-CCNC: 84 U/L (ref 45–117)
ALT SERPL-CCNC: 8 U/L (ref 12–78)
ANION GAP SERPL CALC-SCNC: 3 MMOL/L (ref 5–15)
AST SERPL-CCNC: 10 U/L (ref 15–37)
BASOPHILS # BLD: 0 K/UL (ref 0–0.1)
BASOPHILS NFR BLD: 0 % (ref 0–1)
BILIRUB SERPL-MCNC: 0.5 MG/DL (ref 0.2–1)
BNP SERPL-MCNC: ABNORMAL PG/ML
BUN SERPL-MCNC: 44 MG/DL (ref 6–20)
BUN/CREAT SERPL: 21 (ref 12–20)
CALCIUM SERPL-MCNC: 9 MG/DL (ref 8.5–10.1)
CHLORIDE SERPL-SCNC: 106 MMOL/L (ref 97–108)
CO2 SERPL-SCNC: 28 MMOL/L (ref 21–32)
COMMENT, HOLDF: NORMAL
CREAT SERPL-MCNC: 2.09 MG/DL (ref 0.55–1.02)
D DIMER PPP FEU-MCNC: 4.31 MG/L FEU (ref 0–0.65)
DIFFERENTIAL METHOD BLD: ABNORMAL
EOSINOPHIL # BLD: 0.2 K/UL (ref 0–0.4)
EOSINOPHIL NFR BLD: 2 % (ref 0–7)
ERYTHROCYTE [DISTWIDTH] IN BLOOD BY AUTOMATED COUNT: 13.6 % (ref 11.5–14.5)
GLOBULIN SER CALC-MCNC: 3 G/DL (ref 2–4)
GLUCOSE SERPL-MCNC: 168 MG/DL (ref 65–100)
HCT VFR BLD AUTO: 27.9 % (ref 35–47)
HGB BLD-MCNC: 8.7 G/DL (ref 11.5–16)
IMM GRANULOCYTES # BLD AUTO: 0.1 K/UL (ref 0–0.04)
IMM GRANULOCYTES NFR BLD AUTO: 1 % (ref 0–0.5)
LYMPHOCYTES # BLD: 0.3 K/UL (ref 0.8–3.5)
LYMPHOCYTES NFR BLD: 3 % (ref 12–49)
MCH RBC QN AUTO: 32.2 PG (ref 26–34)
MCHC RBC AUTO-ENTMCNC: 31.2 G/DL (ref 30–36.5)
MCV RBC AUTO: 103.3 FL (ref 80–99)
MONOCYTES # BLD: 0.6 K/UL (ref 0–1)
MONOCYTES NFR BLD: 5 % (ref 5–13)
NEUTS SEG # BLD: 10 K/UL (ref 1.8–8)
NEUTS SEG NFR BLD: 89 % (ref 32–75)
NRBC # BLD: 0 K/UL (ref 0–0.01)
NRBC BLD-RTO: 0 PER 100 WBC
PLATELET # BLD AUTO: 244 K/UL (ref 150–400)
PMV BLD AUTO: 8.7 FL (ref 8.9–12.9)
POTASSIUM SERPL-SCNC: 4.5 MMOL/L (ref 3.5–5.1)
PROT SERPL-MCNC: 6.2 G/DL (ref 6.4–8.2)
RBC # BLD AUTO: 2.7 M/UL (ref 3.8–5.2)
RBC MORPH BLD: ABNORMAL
SAMPLES BEING HELD,HOLD: NORMAL
SODIUM SERPL-SCNC: 137 MMOL/L (ref 136–145)
TROPONIN-HIGH SENSITIVITY: 41 NG/L (ref 0–51)
WBC # BLD AUTO: 11.2 K/UL (ref 3.6–11)

## 2022-01-15 PROCEDURE — 96374 THER/PROPH/DIAG INJ IV PUSH: CPT

## 2022-01-15 PROCEDURE — 99285 EMERGENCY DEPT VISIT HI MDM: CPT

## 2022-01-15 PROCEDURE — 36415 COLL VENOUS BLD VENIPUNCTURE: CPT

## 2022-01-15 PROCEDURE — 74011250637 HC RX REV CODE- 250/637: Performed by: HOSPITALIST

## 2022-01-15 PROCEDURE — G0378 HOSPITAL OBSERVATION PER HR: HCPCS

## 2022-01-15 PROCEDURE — 85025 COMPLETE CBC W/AUTO DIFF WBC: CPT

## 2022-01-15 PROCEDURE — 74011000250 HC RX REV CODE- 250: Performed by: HOSPITALIST

## 2022-01-15 PROCEDURE — 74011000250 HC RX REV CODE- 250: Performed by: EMERGENCY MEDICINE

## 2022-01-15 PROCEDURE — 83880 ASSAY OF NATRIURETIC PEPTIDE: CPT

## 2022-01-15 PROCEDURE — 85379 FIBRIN DEGRADATION QUANT: CPT

## 2022-01-15 PROCEDURE — 94640 AIRWAY INHALATION TREATMENT: CPT

## 2022-01-15 PROCEDURE — 84484 ASSAY OF TROPONIN QUANT: CPT

## 2022-01-15 PROCEDURE — 71045 X-RAY EXAM CHEST 1 VIEW: CPT

## 2022-01-15 PROCEDURE — 96375 TX/PRO/DX INJ NEW DRUG ADDON: CPT

## 2022-01-15 PROCEDURE — 96376 TX/PRO/DX INJ SAME DRUG ADON: CPT

## 2022-01-15 PROCEDURE — 93005 ELECTROCARDIOGRAM TRACING: CPT

## 2022-01-15 PROCEDURE — 99223 1ST HOSP IP/OBS HIGH 75: CPT | Performed by: INTERNAL MEDICINE

## 2022-01-15 PROCEDURE — 94760 N-INVAS EAR/PLS OXIMETRY 1: CPT

## 2022-01-15 PROCEDURE — 80053 COMPREHEN METABOLIC PANEL: CPT

## 2022-01-15 RX ORDER — PANTOPRAZOLE SODIUM 40 MG/1
40 TABLET, DELAYED RELEASE ORAL
Status: DISCONTINUED | OUTPATIENT
Start: 2022-01-16 | End: 2022-01-27 | Stop reason: HOSPADM

## 2022-01-15 RX ORDER — POLYETHYLENE GLYCOL 3350 17 G/17G
17 POWDER, FOR SOLUTION ORAL
Status: DISCONTINUED | OUTPATIENT
Start: 2022-01-15 | End: 2022-01-27 | Stop reason: HOSPADM

## 2022-01-15 RX ORDER — ATORVASTATIN CALCIUM 40 MG/1
40 TABLET, FILM COATED ORAL
Status: DISCONTINUED | OUTPATIENT
Start: 2022-01-15 | End: 2022-01-27 | Stop reason: HOSPADM

## 2022-01-15 RX ORDER — CARVEDILOL 6.25 MG/1
25 TABLET ORAL 2 TIMES DAILY WITH MEALS
Status: DISCONTINUED | OUTPATIENT
Start: 2022-01-15 | End: 2022-01-15

## 2022-01-15 RX ORDER — CARVEDILOL 12.5 MG/1
37.5 TABLET ORAL 2 TIMES DAILY WITH MEALS
Status: DISCONTINUED | OUTPATIENT
Start: 2022-01-16 | End: 2022-01-19

## 2022-01-15 RX ORDER — CILOSTAZOL 50 MG/1
100 TABLET ORAL
Status: DISCONTINUED | OUTPATIENT
Start: 2022-01-15 | End: 2022-01-27 | Stop reason: HOSPADM

## 2022-01-15 RX ORDER — DULOXETIN HYDROCHLORIDE 60 MG/1
120 CAPSULE, DELAYED RELEASE ORAL DAILY
Status: DISCONTINUED | OUTPATIENT
Start: 2022-01-16 | End: 2022-01-27 | Stop reason: HOSPADM

## 2022-01-15 RX ORDER — IPRATROPIUM BROMIDE AND ALBUTEROL SULFATE 2.5; .5 MG/3ML; MG/3ML
3 SOLUTION RESPIRATORY (INHALATION)
Status: COMPLETED | OUTPATIENT
Start: 2022-01-15 | End: 2022-01-15

## 2022-01-15 RX ORDER — NITROGLYCERIN 0.4 MG/1
0.4 TABLET SUBLINGUAL
Status: DISCONTINUED | OUTPATIENT
Start: 2022-01-15 | End: 2022-01-27 | Stop reason: HOSPADM

## 2022-01-15 RX ORDER — ARIPIPRAZOLE 10 MG/1
20 TABLET ORAL DAILY
Status: DISCONTINUED | OUTPATIENT
Start: 2022-01-16 | End: 2022-01-27 | Stop reason: HOSPADM

## 2022-01-15 RX ORDER — DILTIAZEM HYDROCHLORIDE 30 MG/1
30 TABLET, FILM COATED ORAL
Status: DISCONTINUED | OUTPATIENT
Start: 2022-01-15 | End: 2022-01-18

## 2022-01-15 RX ORDER — UREA 10 %
100 LOTION (ML) TOPICAL DAILY
Status: DISCONTINUED | OUTPATIENT
Start: 2022-01-16 | End: 2022-01-27 | Stop reason: HOSPADM

## 2022-01-15 RX ORDER — CLOPIDOGREL BISULFATE 75 MG/1
75 TABLET ORAL
Status: DISCONTINUED | OUTPATIENT
Start: 2022-01-16 | End: 2022-01-27 | Stop reason: HOSPADM

## 2022-01-15 RX ORDER — CARBIDOPA AND LEVODOPA 25; 100 MG/1; MG/1
2 TABLET ORAL 4 TIMES DAILY
Status: DISCONTINUED | OUTPATIENT
Start: 2022-01-15 | End: 2022-01-27 | Stop reason: HOSPADM

## 2022-01-15 RX ORDER — GABAPENTIN 100 MG/1
100 CAPSULE ORAL
Status: DISCONTINUED | OUTPATIENT
Start: 2022-01-15 | End: 2022-01-19 | Stop reason: ALTCHOICE

## 2022-01-15 RX ORDER — BUMETANIDE 0.25 MG/ML
1 INJECTION INTRAMUSCULAR; INTRAVENOUS 2 TIMES DAILY
Status: DISCONTINUED | OUTPATIENT
Start: 2022-01-15 | End: 2022-01-19

## 2022-01-15 RX ORDER — MELATONIN
1000 DAILY
Status: DISCONTINUED | OUTPATIENT
Start: 2022-01-16 | End: 2022-01-27 | Stop reason: HOSPADM

## 2022-01-15 RX ORDER — BUSPIRONE HYDROCHLORIDE 5 MG/1
5 TABLET ORAL 3 TIMES DAILY
Status: DISCONTINUED | OUTPATIENT
Start: 2022-01-15 | End: 2022-01-19

## 2022-01-15 RX ORDER — AMOXICILLIN 250 MG
1 CAPSULE ORAL
Status: DISCONTINUED | OUTPATIENT
Start: 2022-01-15 | End: 2022-01-27 | Stop reason: HOSPADM

## 2022-01-15 RX ORDER — IPRATROPIUM BROMIDE AND ALBUTEROL SULFATE 2.5; .5 MG/3ML; MG/3ML
3 SOLUTION RESPIRATORY (INHALATION)
Status: DISCONTINUED | OUTPATIENT
Start: 2022-01-15 | End: 2022-01-27 | Stop reason: HOSPADM

## 2022-01-15 RX ORDER — BUMETANIDE 0.25 MG/ML
2 INJECTION INTRAMUSCULAR; INTRAVENOUS
Status: COMPLETED | OUTPATIENT
Start: 2022-01-15 | End: 2022-01-15

## 2022-01-15 RX ADMIN — GABAPENTIN 100 MG: 100 CAPSULE ORAL at 21:21

## 2022-01-15 RX ADMIN — IPRATROPIUM BROMIDE AND ALBUTEROL SULFATE 3 ML: .5; 3 SOLUTION RESPIRATORY (INHALATION) at 14:12

## 2022-01-15 RX ADMIN — BUMETANIDE 1 MG: 0.25 INJECTION INTRAMUSCULAR; INTRAVENOUS at 17:35

## 2022-01-15 RX ADMIN — CARBIDOPA AND LEVODOPA 2 TABLET: 25; 100 TABLET ORAL at 21:21

## 2022-01-15 RX ADMIN — BUSPIRONE HYDROCHLORIDE 5 MG: 5 TABLET ORAL at 17:36

## 2022-01-15 RX ADMIN — BUMETANIDE 2 MG: 0.25 INJECTION INTRAMUSCULAR; INTRAVENOUS at 11:14

## 2022-01-15 RX ADMIN — CARBIDOPA AND LEVODOPA 2 TABLET: 25; 100 TABLET ORAL at 12:54

## 2022-01-15 RX ADMIN — ATORVASTATIN CALCIUM 40 MG: 40 TABLET, FILM COATED ORAL at 21:21

## 2022-01-15 RX ADMIN — CILOSTAZOL 100 MG: 50 TABLET ORAL at 17:36

## 2022-01-15 RX ADMIN — BUSPIRONE HYDROCHLORIDE 5 MG: 5 TABLET ORAL at 21:21

## 2022-01-15 NOTE — ED PROVIDER NOTES
This is an 60-year-old female who was discharged from the hospital yesterday after an admission for a week for respiratory failure and diastolic heart failure. She was treated with diuretics. She was evaluated by cardiology and there was no new ischemic change or acute difficulty noted. She does have stage IV renal disease and was treated on 1/11 for anemia with a transfusion. Her Eliquis was held. Patient was then discharged to nursing facility. She returns today by EMS with a history of increasing shortness of breath. She states that her shortness of breath started about an hour prior to arrival.  She has had no chest pain, fever or chill and no nausea or vomiting. She denies any diarrhea or urinary symptoms. She has had no leg pain. She says she just started with increasing shortness of breath and was found to have saturations that were extremely low on 6 to 10 L of oxygen. She was sent here for further evaluation.            Past Medical History:   Diagnosis Date    Anxiety disorder     Atrial fibrillation (HCC)     CAD (coronary artery disease) 2007    stents, CABG x 3v    Carotid stenosis     Cervical stenosis of spinal canal 07/2019    Chronic kidney disease     Cough     CVA (cerebral vascular accident) ([facility] 07/2019    left lacunar infarct at head of caudate    Depression     AND CHRONIC ANXIETY    Diabetes (HCC)     GERD (gastroesophageal reflux disease)     High cholesterol     History of peptic ulcer     Bleeding ulcer with increased NSAID use    Hypertension     Left carotid stenosis 07/2019    s/p left CEA with Dr. Ariela Munson, old 2007    PUD (peptic ulcer disease)     Stroke ([facility]     Tremor     Valvular heart disease        Past Surgical History:   Procedure Laterality Date    COLONOSCOPY N/A 12/17/2021    COLONOSCOPY   :- performed by Leandro Ching MD at [address] HX CAROTID ENDARTERECTOMY  07/2019    HX CORONARY ARTERY BYPASS GRAFT      HX TONSILLECTOMY  1963    [place] CARDIAC SURG PROCEDURE UNLIST      CABG X3 VESSEL    [place] TOTAL KNEE ARTHROPLASTY Right 2015         Family History:   Problem Relation Age of Onset    Heart Attack Mother 72        Dec 80yo    Hypertension Mother     Other Father         Unknown    Parkinson's Disease Brother     Anesth Problems Neg Hx        Social History     Socioeconomic History    Marital status: SINGLE     Spouse name: Not on file    Number of children: Not on file    Years of education: Not on file    Highest education level: Not on file   Occupational History    Occupation: Retired realestate/teacher   Tobacco Use    Smoking status: Former Smoker     Packs/day: 0.25     Years: 5.00     Pack years: 1.25     Types: Cigarettes     Quit date:      Years since quittin.0    Smokeless tobacco: Never Used   Substance and Sexual Activity    Alcohol use: Not Currently     Comment: Rare    Drug use: No    Sexual activity: Not on file   Other Topics Concern    Not on file   Social History Narrative    [facility] in [facility]     Social Determinants of Health     Financial Resource Strain:     Difficulty of Paying Living Expenses: Not on file   Food Insecurity:     Worried About [address] in the Last Year: Not on file    [address] in the Last Year: Not on file   Transportation Needs:     Lack of Transportation (Medical): Not on file    Lack of Transportation (Non-Medical):  Not on file   Physical Activity:     Days of Exercise per Week: Not on file    Minutes of Exercise per Session: Not on file   Stress:     Feeling of Stress : Not on file   Social Connections:     Frequency of Communication with Friends and Family: Not on file    Frequency of Social Gatherings with Friends and Family: Not on file    Attends Gnosticism Services: Not on file    Active Member of Clubs or Organizations: Not on file    Attends Club or Organization Meetings: Not on file    Marital Status: Not on file   Intimate Partner Violence:     Fear of Current or Ex-Partner: Not on file    Emotionally Abused: Not on file    Physically Abused: Not on file    Sexually Abused: Not on file   Housing Stability:     Unable to Pay for Housing in the Last Year: Not on file    Number of [place] in the Last Year: Not on file    Unstable Housing in the Last Year: Not on file         ALLERGIES: Patient has no known allergies. Review of Systems   Constitutional: Negative for activity change, appetite change and fatigue. HENT: Negative for ear pain, facial swelling, sore throat and trouble swallowing. Eyes: Negative for pain, discharge and visual disturbance. Respiratory: Positive for shortness of breath. Negative for cough, chest tightness and wheezing. Cardiovascular: Negative for chest pain and palpitations. Gastrointestinal: Negative for abdominal pain, blood in stool, nausea and vomiting. Genitourinary: Negative for difficulty urinating, flank pain and hematuria. Musculoskeletal: Negative for arthralgias, joint swelling, myalgias and neck pain. Skin: Negative for color change and rash. Neurological: Negative for dizziness, weakness, numbness and headaches. Hematological: Negative for adenopathy. Does not bruise/bleed easily. Psychiatric/Behavioral: Negative for behavioral problems, confusion and sleep disturbance. All other systems reviewed and are negative. There were no vitals filed for this visit. Physical Exam  Vitals and nursing note reviewed. Constitutional:       General: She is in acute distress ( Mild with shortness of breath. ). Appearance: She is well-developed. She is ill-appearing. She is not diaphoretic. HENT:      Head: Normocephalic and atraumatic. Nose: Nose normal.   Eyes:      General: No scleral icterus. Conjunctiva/sclera: Conjunctivae normal.      Pupils: Pupils are equal, round, and reactive to light.       Comments: Small area of bruising noted on the left upper lid. Neck:      Thyroid: No thyromegaly. Vascular: No JVD. Trachea: No tracheal deviation. Comments: No carotid bruits noted. Cardiovascular:      Rate and Rhythm: Normal rate. Rhythm irregular. Heart sounds: Normal heart sounds. No murmur heard. No friction rub. No gallop. Pulmonary:      Effort: Pulmonary effort is normal. No respiratory distress. Breath sounds: Wheezing ( Few bilaterally. No consolidation noted.) present. No rales. Chest:      Chest wall: No tenderness. Abdominal:      General: Bowel sounds are normal. There is no distension. Palpations: Abdomen is soft. There is no mass. Tenderness: There is no abdominal tenderness. There is no guarding or rebound. Musculoskeletal:         General: No tenderness. Normal range of motion. Cervical back: Normal range of motion and neck supple. Lymphadenopathy:      Cervical: No cervical adenopathy. Skin:     General: Skin is warm and dry. Capillary Refill: Capillary refill takes 2 to 3 seconds. Findings: No erythema or rash. Neurological:      General: No focal deficit present. Mental Status: She is alert and oriented to person, place, and time. Cranial Nerves: No cranial nerve deficit. Coordination: Coordination normal.      Deep Tendon Reflexes: Reflexes are normal and symmetric. Psychiatric:         Behavior: Behavior normal.         Thought Content:  Thought content normal.         Judgment: Judgment normal.          MDM  Number of Diagnoses or Management Options     Amount and/or Complexity of Data Reviewed  Clinical lab tests: ordered and reviewed  Tests in the radiology section of CPT®: ordered and reviewed  Decide to obtain previous medical records or to obtain history from someone other than the patient: yes  Review and summarize past medical records: yes  Discuss the patient with other providers: yes  Independent visualization of images, tracings, or specimens: yes    Risk of Complications, Morbidity, and/or Mortality  Presenting problems: high  Diagnostic procedures: high  Management options: high    Patient Progress  Patient progress: stable         Procedures    This is an 60-year-old female who presents with shortness of breath after discharge yesterday evening from the hospitalist service where she had been admitted for a week with acute on chronic heart failure. There is no history of fever or chill and the patient denies any pain. As she is running sats at 74% in the nursing home and went up to 96% on 10 L per EMS. She was placed on 4 L here and she is running 91 to 93%. Will evaluate with lab and x-ray and then consult the hospitalist to reevaluate this patient for discharge less than 24 hours ago. ED MD EKG interpretation: There is an atrial fibrillation with left axis shift noted. Ventricular rate is 99 beats a minute. There is poor R wave progression and incomplete bundle branch. Chest x-ray is consistent with heart failure. D-dimer was also somewhat elevated. Consult was placed with the hospitalist and Dr. Daryle Cypher has seen the patient and will admit.

## 2022-01-15 NOTE — H&P
[address] [facility] Adult  Hospitalist Group  History and Physical    Date of Service:  1/15/2022  Primary Care Provider: Scotty Jarrett DO  Source of information: The patient    Chief Complaint: Shortness of Breath      History of Presenting Illness:   Ariana Duckworth is a [de-identified] y.o. female who presents with SOB     This is an 28-year-old female who was discharged from the hospital yesterday after an admission for a week for CHF exacerbation/anemia/afib/MORENO. She was treated with diuretics first, then her diuretics was all on held since 1/11 due to hypotension. She was also transfused on 1/11 due to anemia and her previous [address] Eliquis  stopped due to concerning of bleeding. She was discharged back to [facility] yesterday. Per [place] record, her diuretics, eliquis were all stopped on discharge. She returns today by EMS with a history of increasing shortness of breath. She states that her shortness of breath started about an hour prior to arrival.  She has had no chest pain, fever or chill and no nausea or vomiting. She denies any diarrhea or urinary symptoms. She has had no leg pain. She says she just started with increasing shortness of breath and was found to have saturations that were extremely low on 6 to 10 L of oxygen. She was sent here for further evaluation. She was given bumex 2mg one dose in ER. She has covid test yesterday prior discharge negative   No fever. REVIEW OF SYSTEMS:  General: HPI, no changes of appetite. HEENT: no headache, no vision changes, no nose discharge, no hearing changes   RES: HPI   CVS: no cp, no palpitation.   Muscular: no joint swelling, no muscle pain, no leg swelling  Skin: no rash, no itching   GI: no vomiting, no diarrhea  : no dysuria, no hematuria  Hemo: no gum bleeding, no petechial   Neuro: no sensation changes, no focal weakness   Endo: no polydipsia   Psych: denied depression     Past Medical History:   Diagnosis Date    Anxiety disorder     [facility]     CAD (coronary artery disease) 2007    stents, CABG x 3v    Carotid stenosis     Cervical stenosis of spinal canal 07/2019    Chronic kidney disease     Cough     CVA (cere[facility] vascular accident) ([facility] 07/2019    left lacunar infarct at head of caudate    Depression     AND CHRONIC ANXIETY    Diabetes ([facility]     GERD (gastroesophageal reflux disease)     High cholesterol     History of peptic ulcer     Bleeding ulcer with increased NSAID use    Hypertension     Left carotid stenosis 07/2019    s/p left CEA with Dr. Brgiht Castro, old 2007    PUD (peptic ulcer disease)     Stroke ([facility]     Tremor     Valvular heart disease       Past Surgical History:   Procedure Laterality Date    COLONOSCOPY N/A 12/17/2021    COLONOSCOPY   :- performed by Bebo Madrid MD at [address] HX CAROTID ENDARTERECTOMY  07/2019    HX CORONARY ARTERY BYPASS GRAFT      HX TONSILLECTOMY  1963    MI CARDIAC SURG PROCEDURE UNLIST      CABG X3 VESSEL    MI TOTAL KNEE ARTHROPLASTY Right 2015     Prior to Admission medications    Medication Sig Start Date End Date Taking? Authorizing Provider   carvediloL (COREG) 25 mg tablet Take 1 Tablet by mouth two (2) times daily (with meals) for 30 days. 1/14/22 2/13/22  Marie Hong MD   dilTIAZem IR (CARDIZEM) 30 mg tablet Take 1 Tablet by mouth Before breakfast, lunch, and dinner for 30 days. 1/14/22 2/13/22  Marie Hong MD   ARIPiprazole (ABILIFY) 20 mg tablet Take 1 Tablet by mouth daily. 1/7/22   Abbie Kramer MD   busPIRone (BUSPAR) 5 mg tablet Take 1 Tablet by mouth three (3) times daily. 1/7/22   Abbie Kramer MD   DULoxetine (CYMBALTA) 60 mg capsule Take 2 Capsules by mouth daily. Indications: anxiousness associated with depression 1/7/22   Abbie Kramer MD   senna-docusate (Senna with Docusate Sodium) 8.6-50 mg per tablet Take 1 Tablet by mouth nightly.  1/6/22   Abbie Kramer MD   polyethylene glycol [facility]) 17 gram packet Take 1 Packet by mouth daily as needed for Constipation for up to 30 days. 12/21/21 1/20/22  Noah Shepherd MD   ondansetron (ZOFRAN ODT) 4 mg disintegrating tablet Take 1 Tablet by mouth every eight (8) hours as needed for Nausea or Vomiting. 12/21/21   Noah Shepherd MD   cilostazoL (PLETAL) 100 mg tablet Take 100 mg by mouth Before breakfast and dinner. Provider, Historical   hydrALAZINE (APRESOLINE) 50 mg tablet Take 1 Tablet by mouth three (3) times daily. 7/16/21   Steve Salgado MD   atorvastatin (LIPITOR) 40 mg tablet Take 1 Tablet by mouth nightly. 7/16/21   Steve Salgado MD   gabapentin (NEURONTIN) 100 mg capsule Take 100 mg by mouth nightly. Provider, Historical   melatonin 3 mg tablet Take 6 mg by mouth nightly. Provider, Historical   nitroglycerin (Nitrostat) 0.4 mg SL tablet 0.4 mg by SubLINGual route every five (5) minutes as needed for Chest Pain. Up to 3 doses. Provider, Historical   clopidogreL (Plavix) 75 mg tab Take 75 mg by mouth daily (after breakfast). Provider, Historical   carbidopa-levodopa (SINEMET)  mg per tablet Take 2 Tabs by mouth four (4) times daily. 2/4/20   Estuardo Blanchard MD   acetaminophen (TYLENOL) 325 mg tablet Take 650 mg by mouth every six (6) hours as needed for Pain or Fever. Provider, Historical   cyanocobalamin (VITAMIN B12) 100 mcg tablet Take 100 mcg by mouth daily. Provider, Historical   Cholecalciferol, Vitamin D3, 1,000 unit cap Take 1,000 Units by mouth daily. Provider, Historical   pantoprazole (PROTONIX) 40 mg tablet Take 40 mg by mouth Daily (before breakfast). Indications: h/o bleeding ulcer with nsaid    Provider, Historical   multivitamins-minerals-lutein (CENTRUM SILVER) tab tablet Take 1 Tablet by mouth daily.     Provider, Historical     No Known Allergies   Family History   Problem Relation Age of Onset    Heart Attack Mother 72        Dec 81yo    Hypertension Mother    Xin Alu Other Father         Unknown  Parkinson's Disease Brother     Anesth Problems Neg Hx       Social History:  reports that she quit smoking about 53 years ago. Her smoking use included cigarettes. She has a 1.25 pack-year smoking history. She has never used smokeless tobacco. She reports previous alcohol use. She reports that she does not use drugs. Family and social history were personally reviewed, all pertinent and relevant details are outlined as above. Objective:     Visit Vitals  BP (!) 142/78   Pulse (!) 109   Temp 98.2 °F (36.8 °C)   Resp 28   Ht 5' 5\" (1.651 m)   Wt 75 kg (165 lb 5.5 oz)   SpO2 90%   BMI 27.51 kg/m²    O2 Flow Rate (L/min): 4 l/min O2 Device: Nasal cannula    PHYSICAL EXAM:   General: Alert x oriented x 3, awake, cooperative   HEENT: PEERL, EOMI, moist mucus membranes  Neck: Supple, 3cm JVD, no meningeal signs  Chest: decrease bs bilat lower lung , no wheezing    CVS: irregular , S1 S2 heard, no murmurs/rubs/gallops  Abd: Soft, non-tender, non-distended, +bowel sounds   Ext: No clubbing, no cyanosis, no edema  Neuro/Psych: Pleasant mood and affect, CN 2-12 grossly intact, sensory grossly within normal limit, Strength 5/5 in all extremities, DTR 1+ x 4  Cap refill: Brisk, less than 3 seconds  Pulses: 2+, symmetric in all extremities  Skin: Warm, dry, without rashes or lesions    Data Review: All diagnostic labs and studies have been reviewed. Abnormal Labs Reviewed   CBC WITH AUTOMATED DIFF - Abnormal; Notable for the following components:       Result Value    WBC 11.2 (*)     RBC 2.70 (*)     HGB 8.7 (*)     HCT 27.9 (*)     .3 (*)     MPV 8.7 (*)     NEUTROPHILS 89 (*)     LYMPHOCYTES 3 (*)     IMMATURE GRANULOCYTES 1 (*)     ABS. NEUTROPHILS 10.0 (*)     ABS. LYMPHOCYTES 0.3 (*)     ABS. IMM.  GRANS. 0.1 (*)     All other components within normal limits   METABOLIC PANEL, COMPREHENSIVE - Abnormal; Notable for the following components:    Anion gap 3 (*)     Glucose 168 (*)     BUN 44 (*) Creatinine 2.09 (*)     BUN/Creatinine ratio 21 (*)     GFR est AA 28 (*)     GFR est non-AA 23 (*)     ALT (SGPT) 8 (*)     AST (SGOT) 10 (*)     Protein, total 6.2 (*)     Albumin 3.2 (*)     All other components within normal limits   NT-PRO BNP - Abnormal; Notable for the following components:    NT pro-BNP 11,624 (*)     All other components within normal limits   D DIMER - Abnormal; Notable for the following components:    D-dimer 4.31 (*)     All other components within normal limits       All Micro Results     None          IMAGING:   XR CHEST PORT   Final Result      There is persistent pulmonary edema pattern with likely small effusions. Heart  size is top normal. There is prior CABG. There is no pneumothorax or midline  shift.     Pression: Persistent pulmonary edema pattern. ECG/ECHO:    Results for orders placed or performed during the hospital encounter of 01/07/22   EKG, 12 LEAD, INITIAL   Result Value Ref Range    Ventricular Rate 109 BPM    QRS Duration 138 ms    Q-T Interval 372 ms    QTC Calculation (Bezet) 500 ms    Calculated R Axis -8 degrees    Calculated T Axis 136 degrees    Diagnosis       Atrial fibrillation with rapid ventricular response  Left bundle branch block  Abnormal ECG  When compared with ECG of 03-JAN-2022 10:53,  QRS axis shifted left  T wave inversion no longer evident in Inferior leads  T wave inversion less evident in Anterolateral leads  Confirmed by Saad Caruso M.D., Armin Mendez ([id number]) on 1/8/2022 2:23:54 PM          Assessment:   Given the patient's current clinical presentation, there is a high level of concern for decompensation if discharged from the emergency department. Complex decision making was performed, which includes reviewing the patient's available past medical records, laboratory results, and imaging studies. Active Problems:    CHF exacerbation ([address] (1/15/2022)      Plan:   1. Acute on chronic resp failure due to CHF exacerbation: continue diuresis.    2. Acute on chronic systolic congestive heart failure NYHA4: difficulty to manage due to her advanced CKD, noticed this is her 3rd readmission last 30 days. Received bumex 2mg in ER this morning , will give one more dose bumex 1mg this after noon and continue bumex 1mg bid. Following cr, weight urine output. 3. MORENO on CKD: cr range from 1.45 to 2.3 last month, will continue diuretics due to her respiratory distress. Renal consult. 4. HTN: BP labile. Will hold hydralazine for now. Continue coreg and cardizem with holding parameter. 5. Chronic afib: rate controlled, her long acting cardizem changed to IR last admission, will continue. OCA dced per cardiologist.       BMI: Body mass index is 27.51 kg/m². . This patient: Has a BMI within normal limits. DIET: ADULT DIET Regular; Low Fat/Low Chol/High Fiber/2 gm Na   ISOLATION PRECAUTIONS: There are currently no Active Isolations  CODE STATUS: Prior   DVT PROPHYLAXIS: Heparin  FUNCTIONAL STATUS PRIOR TO HOSPITALIZATION: Capable of only limited self-care; confined to bed or chair likely more than 50% of waking hours. EARLY MOBILITY ASSESSMENT: Recommend an assessment from physical therapy and/or occupational therapy  ANTICIPATED DISCHARGE: 24-48 hours. EMERGENCY CONTACT/SURROGATE DECISION MAKER: Kalyan Wagner, patient does not want me to call her today because she is out of town now. Other Niroxanne Combs called and updated     CRITICAL CARE WAS PERFORMED FOR THIS ENCOUNTER: NO.      Signed By: Morgan Prado MD     January 15, 2022         Please note that this dictation may have been completed with Dragon, the [employer] voice recognition software. Quite often unanticipated grammatical, syntax, homophones, and other interpretive errors are inadvertently transcribed by the computer software. Please disregard these errors. Please excuse any errors that have escaped final proofreading.

## 2022-01-15 NOTE — CONSULTS
[facility]   [address] [facility], [address] [place] [address], [facility]  Phone: [phone] NOTE     Patient: Omayra Mckinnon MRN: [id number]  PCP: Jadine Ormond, DO   :     1940  Age:   [de-identified] y.o. Sex:  female      Referring physician: Cristopher Gomes MD  Reason for consultation: [de-identified] y.o. female with CHF exacerbation ([facility] [C86.6] complicated by MORENO   Admission Date: 1/15/2022  9:35 AM  LOS: 0 days      ASSESSMENT and PLAN :      CKD4   - diff: progressive disease vs CRS  - last  renal U/S neg for hydro, + for bladder distention  -cr at 2 here on presentation  - continue IV Bumex 1 mg BID      Afib with RVR  - per cards  - on carvedilol and Diltiazem        CKD IIIa:  - baseline Cr around 1.4, not any more   - presumed 2/2 chronic HTN     HTN:  - BP stable     Acute on Chronic HFpEF  CAD, hx of CABG  - ECHO w/ EF 55-60%  - rapid COVID neg  - diuretics as above   - recently taken off diuretics in last admission  .       Parkinsons  Hx of CVA  PAD w/ L CEA  AAA  Care Plan discussed with:  Pt         Thank you for consulting [facility] in the care of your patient. Subjective:   HPI: Omayra Mckinnon is a [de-identified] y.o.  female who has been admitted to the hospital for Sob. She was dced prior 24 hrs ago and returns from NH with Sob/ She was taken off elaquis and Bumex last admission at time of D/c. She presents with worsening SOB. Baselin ce cr 1.6--2./  Cr here at 2. She was started on IV diuretics. Nep was consulted for CKD status.        Past Medical Hx:   Past Medical History:   Diagnosis Date    Anxiety disorder     Atrial fibrillation ([facility]     CAD (coronary artery disease) 2007    stents, CABG x 3v    Carotid stenosis     Cervical stenosis of spinal canal 2019    Chronic kidney disease     Cough     CVA (cerebral vascular accident) ([facility] 2019    left lacunar infarct at head of caudate    Depression     AND CHRONIC ANXIETY    Diabetes ([facility]     GERD (gastroesophageal reflux disease)     High cholesterol     History of peptic ulcer     Bleeding ulcer with increased NSAID use    Hypertension     Left carotid stenosis 07/2019    s/p left CEA with Dr. Pamela Wylie, old 2007    PUD (peptic ulcer disease)     Stroke ([facility]     Tremor     Valvular heart disease         Past Surgical Hx:     Past Surgical History:   Procedure Laterality Date    COLONOSCOPY N/A 12/17/2021    COLONOSCOPY   :- performed by Shawn Crain MD at [address] HX CAROTID ENDARTERECTOMY  07/2019    HX CORONARY ARTERY BYPASS GRAFT      HX TONSILLECTOMY  1963    HI CARDIAC SURG PROCEDURE UNLIST      CABG X3 VESSEL    HI TOTAL KNEE ARTHROPLASTY Right 2015         No Known Allergies    Social Hx:  reports that she quit smoking about 53 years ago. Her smoking use included cigarettes. She has a 1.25 pack-year smoking history. She has never used smokeless tobacco. She reports previous alcohol use. She reports that she does not use drugs. Family History   Problem Relation Age of Onset    Heart Attack Mother 72        Dec 79yo    Hypertension Mother     Other Father         Unknown    Parkinson's Disease Brother     Anesth Problems Neg Hx        Review of Systems:  A thorough twelve point review of system was performed today. Pertinent positives and negatives are mentioned in the HPI. The reminder of the ROS is negative and noncontributory. Objective:    Vitals:    Vitals:    01/15/22 1200 01/15/22 1230 01/15/22 1300 01/15/22 1412   BP: 127/89 130/74 115/63    Pulse: 76 74 61    Resp: 18 19 19    Temp:       SpO2: 94% 94% 94% 96%   Weight:       Height:         I&O's:  No intake/output data recorded.   Visit Vitals  /63   Pulse 61   Temp 98.2 °F (36.8 °C)   Resp 19   Ht 5' 5\" (1.651 m)   Wt 75 kg (165 lb 5.5 oz)   SpO2 96%   BMI 27.51 kg/m²       Physical Exam:  General:  No apparent Distress  HEENT: PERRL,  No Pallor , No Icterus  Neck: Supple,no mass palpable  Lungs : CTA  CVS: RRR, S1 S2 normal, No murmur   Abdomen: Soft, NT, BS +  Extremities: Edema  Skin: No rash or lesions. MS: No joint swelling, erythema, warmth  Neurologic: non focal, AAO x 3  Psych: normal affect/ Unable to assess    Laboratory Results:    Recent Labs     01/15/22  1013 01/14/22 0422 01/13/22  0345    137 136   K 4.5 4.1 4.2    106 103   CO2 28 26 25   * 85 112*   BUN 44* 43* 52*   CREA 2.09* 1.69* 1.97*   CA 9.0 9.1 9.3   ALB 3.2*  --   --    ALT 8*  --   --      Recent Labs     01/15/22  1013 01/14/22  0422 01/13/22  0345   WBC 11.2* 3.8 6.4   HGB 8.7* 7.7* 8.6*   HCT 27.9* 24.6* 26.2*    211 266     No results found for: [facility]  Lab Results   Component Value Date/Time    Culture result: NO GROWTH 5 DAYS 01/07/2022 08:37 PM    Culture result: MRSA NOT PRESENT 12/10/2021 06:53 AM    Culture result:  12/10/2021 06:53 AM     Screening of patient nares for MRSA is for surveillance purposes and, if positive, to facilitate isolation considerations in high risk settings. It is not intended for automatic decolonization interventions per se as regimens are not sufficiently effective to warrant routine use. Recent Results (from the past 24 hour(s))   CBC WITH AUTOMATED DIFF    Collection Time: 01/15/22 10:13 AM   Result Value Ref Range    WBC 11.2 (H) 3.6 - 11.0 K/uL    RBC 2.70 (L) 3.80 - 5.20 M/uL    HGB 8.7 (L) 11.5 - 16.0 g/dL    HCT 27.9 (L) 35.0 - 47.0 %    .3 (H) 80.0 - 99.0 FL    MCH 32.2 26.0 - 34.0 PG    MCHC 31.2 30.0 - 36.5 g/dL    RDW 13.6 11.5 - 14.5 %    PLATELET 483 782 - 437 K/uL    MPV 8.7 (L) 8.9 - 12.9 FL    NRBC 0.0 0  WBC    ABSOLUTE NRBC 0.00 0.00 - 0.01 K/uL    NEUTROPHILS 89 (H) 32 - 75 %    LYMPHOCYTES 3 (L) 12 - 49 %    MONOCYTES 5 5 - 13 %    EOSINOPHILS 2 0 - 7 %    BASOPHILS 0 0 - 1 %    IMMATURE GRANULOCYTES 1 (H) 0.0 - 0.5 %    ABS. NEUTROPHILS 10.0 (H) 1.8 - 8.0 K/UL    ABS.  LYMPHOCYTES 0.3 (L) 0.8 - 3.5 K/UL    ABS. MONOCYTES 0.6 0.0 - 1.0 K/UL    ABS. EOSINOPHILS 0.2 0.0 - 0.4 K/UL    ABS. BASOPHILS 0.0 0.0 - 0.1 K/UL    ABS. IMM. GRANS. 0.1 (H) 0.00 - 0.04 K/UL    DF SMEAR SCANNED      RBC COMMENTS ANISOCYTOSIS  1+       METABOLIC PANEL, COMPREHENSIVE    Collection Time: 01/15/22 10:13 AM   Result Value Ref Range    Sodium 137 136 - 145 mmol/L    Potassium 4.5 3.5 - 5.1 mmol/L    Chloride 106 97 - 108 mmol/L    CO2 28 21 - 32 mmol/L    Anion gap 3 (L) 5 - 15 mmol/L    Glucose 168 (H) 65 - 100 mg/dL    BUN 44 (H) 6 - 20 MG/DL    Creatinine 2.09 (H) 0.55 - 1.02 MG/DL    BUN/Creatinine ratio 21 (H) 12 - 20      GFR est AA 28 (L) >60 ml/min/1.73m2    GFR est non-AA 23 (L) >60 ml/min/1.73m2    Calcium 9.0 8.5 - 10.1 MG/DL    Bilirubin, total 0.5 0.2 - 1.0 MG/DL    ALT (SGPT) 8 (L) 12 - 78 U/L    AST (SGOT) 10 (L) 15 - 37 U/L    Alk. phosphatase 84 45 - 117 U/L    Protein, total 6.2 (L) 6.4 - 8.2 g/dL    Albumin 3.2 (L) 3.5 - 5.0 g/dL    Globulin 3.0 2.0 - 4.0 g/dL    A-G Ratio 1.1 1.1 - 2.2     TROPONIN-HIGH SENSITIVITY    Collection Time: 01/15/22 10:13 AM   Result Value Ref Range    Troponin-High Sensitivity 41 0 - 51 ng/L   NT-PRO BNP    Collection Time: 01/15/22 10:13 AM   Result Value Ref Range    NT pro-BNP 11,624 (H) <450 PG/ML   D DIMER    Collection Time: 01/15/22 10:13 AM   Result Value Ref Range    D-dimer 4.31 (H) 0.00 - 0.65 mg/L FEU   SAMPLES BEING HELD    Collection Time: 01/15/22 10:13 AM   Result Value Ref Range    SAMPLES BEING HELD 1RED     COMMENT        Add-on orders for these samples will be processed based on acceptable specimen integrity and analyte stability, which may vary by analyte.          Urine dipstick:   Lab Results   Component Value Date/Time    Color YELLOW/STRAW 12/30/2021 08:23 PM    Appearance CLOUDY (A) 12/30/2021 08:23 PM    Specific gravity 1.017 12/30/2021 08:23 PM    pH (UA) 6.0 12/30/2021 08:23 PM    Protein 100 (A) 12/30/2021 08:23 PM    Glucose Negative 12/30/2021 08:23 PM    Ketone Negative 12/30/2021 08:23 PM    Bilirubin Negative 12/30/2021 08:23 PM    Urobilinogen 1.0 12/30/2021 08:23 PM    Nitrites Negative 12/30/2021 08:23 PM    Leukocyte Esterase MODERATE (A) 12/30/2021 08:23 PM    Epithelial cells FEW 12/30/2021 08:23 PM    Bacteria 1+ (A) 12/30/2021 08:23 PM    WBC 0-4 12/30/2021 08:23 PM    RBC 0-5 12/30/2021 08:23 PM       I have reviewed the following: All pertinent labs, microbiology data, radiology imaging for my assessment     Medications list Personally Reviewed   [x]      Yes     []               No       Medications:  Prior to Admission medications    Medication Sig Start Date End Date Taking? Authorizing Provider   carvediloL (COREG) 25 mg tablet Take 1 Tablet by mouth two (2) times daily (with meals) for 30 days. 1/14/22 2/13/22  Francy Rojo MD   dilTIAZem IR (CARDIZEM) 30 mg tablet Take 1 Tablet by mouth Before breakfast, lunch, and dinner for 30 days. 1/14/22 2/13/22  Francy Rojo MD   ARIPiprazole (ABILIFY) 20 mg tablet Take 1 Tablet by mouth daily. 1/7/22   Khoa Palmer MD   busPIRone (BUSPAR) 5 mg tablet Take 1 Tablet by mouth three (3) times daily. 1/7/22   Khoa Palmer MD   DULoxetine (CYMBALTA) 60 mg capsule Take 2 Capsules by mouth daily. Indications: anxiousness associated with depression 1/7/22   Khoa Palmer MD   senna-docusate (Senna with Docusate Sodium) 8.6-50 mg per tablet Take 1 Tablet by mouth nightly. 1/6/22   Khoa Palmer MD   polyethylene glycol [facility]) 17 gram packet Take 1 Packet by mouth daily as needed for Constipation for up to 30 days. 12/21/21 1/20/22  Corazon Jacobs MD   ondansetron (ZOFRAN ODT) 4 mg disintegrating tablet Take 1 Tablet by mouth every eight (8) hours as needed for Nausea or Vomiting. 12/21/21   Corazon Jacobs MD   cilostazoL (PLETAL) 100 mg tablet Take 100 mg by mouth Before breakfast and dinner.     Provider, Historical   hydrALAZINE (APRESOLINE) 50 mg tablet Take 1 Tablet by mouth three (3) times daily. 7/16/21   Stevo Song MD   atorvastatin (LIPITOR) 40 mg tablet Take 1 Tablet by mouth nightly. 7/16/21   Stevo Song MD   gabapentin (NEURONTIN) 100 mg capsule Take 100 mg by mouth nightly. Provider, Historical   melatonin 3 mg tablet Take 6 mg by mouth nightly. Provider, Historical   nitroglycerin (Nitrostat) 0.4 mg SL tablet 0.4 mg by SubLINGual route every five (5) minutes as needed for Chest Pain. Up to 3 doses. Provider, Historical   clopidogreL (Plavix) 75 mg tab Take 75 mg by mouth daily (after breakfast). Provider, Historical   carbidopa-levodopa (SINEMET)  mg per tablet Take 2 Tabs by mouth four (4) times daily. 2/4/20   Breanna Vasquez MD   acetaminophen (TYLENOL) 325 mg tablet Take 650 mg by mouth every six (6) hours as needed for Pain or Fever. Provider, Historical   cyanocobalamin (VITAMIN B12) 100 mcg tablet Take 100 mcg by mouth daily. Provider, Historical   Cholecalciferol, Vitamin D3, 1,000 unit cap Take 1,000 Units by mouth daily. Provider, Historical   pantoprazole (PROTONIX) 40 mg tablet Take 40 mg by mouth Daily (before breakfast). Indications: h/o bleeding ulcer with nsaid    Provider, Historical   multivitamins-minerals-lutein (CENTRUM SILVER) tab tablet Take 1 Tablet by mouth daily. Provider, Historical        Thank you for allowing us to participate in the care of this patient. We will follow patient. Please dont hesitate to call with any questions    Kevin Flores MD  [facility]   [address] [facility] [address]  Phone - [phone]   Fax - [phone]  www. [email or web address]

## 2022-01-15 NOTE — ED NOTES
TRANSFER - OUT REPORT:    Verbal report given to LAMONT Mcgovern on [employer]  being transferred to [address] [place] [address] for routine progression of care       Report consisted of patients Situation, Background, Assessment and   Recommendations(SBAR). Information from the following report(s) SBAR, ED Summary, MAR, Recent Results and Cardiac Rhythm afib was reviewed with the receiving nurse. Lines:   Peripheral IV 01/15/22 Left Antecubital (Active)   Site Assessment Clean, dry, & intact 01/15/22 1014   Phlebitis Assessment 0 01/15/22 1014   Infiltration Assessment 0 01/15/22 1014   Dressing Status Clean, dry, & intact 01/15/22 1014   Dressing Type Topical skin adhesive;Transparent 01/15/22 1014   Hub Color/Line Status Pink;Capped;Flushed;Patent 01/15/22 1014   Action Taken Blood drawn 01/15/22 1014        Opportunity for questions and clarification was provided.       Patient transported with:   [facility]

## 2022-01-16 ENCOUNTER — APPOINTMENT (OUTPATIENT)
Dept: GENERAL RADIOLOGY | Age: 82
DRG: 291 | End: 2022-01-16
Attending: HOSPITALIST
Payer: MEDICARE

## 2022-01-16 PROBLEM — I48.91 ATRIAL FIBRILLATION WITH RVR (HCC): Status: ACTIVE | Noted: 2022-01-16

## 2022-01-16 PROBLEM — J96.01 ACUTE RESPIRATORY FAILURE WITH HYPOXIA (HCC): Status: ACTIVE | Noted: 2022-01-16

## 2022-01-16 PROBLEM — J81.1 PULMONARY EDEMA: Status: ACTIVE | Noted: 2022-01-16

## 2022-01-16 PROBLEM — I50.43 ACUTE ON CHRONIC SYSTOLIC AND DIASTOLIC HEART FAILURE, NYHA CLASS 4 (HCC): Status: ACTIVE | Noted: 2022-01-16

## 2022-01-16 LAB
ANION GAP SERPL CALC-SCNC: 6 MMOL/L (ref 5–15)
BASOPHILS # BLD: 0 K/UL (ref 0–0.1)
BASOPHILS NFR BLD: 0 % (ref 0–1)
BUN SERPL-MCNC: 44 MG/DL (ref 6–20)
BUN/CREAT SERPL: 23 (ref 12–20)
CALCIUM SERPL-MCNC: 8.9 MG/DL (ref 8.5–10.1)
CHLORIDE SERPL-SCNC: 104 MMOL/L (ref 97–108)
CO2 SERPL-SCNC: 27 MMOL/L (ref 21–32)
CREAT SERPL-MCNC: 1.93 MG/DL (ref 0.55–1.02)
DIFFERENTIAL METHOD BLD: ABNORMAL
EOSINOPHIL # BLD: 0.2 K/UL (ref 0–0.4)
EOSINOPHIL NFR BLD: 3 % (ref 0–7)
ERYTHROCYTE [DISTWIDTH] IN BLOOD BY AUTOMATED COUNT: 13.3 % (ref 11.5–14.5)
GLUCOSE BLD STRIP.AUTO-MCNC: 137 MG/DL (ref 65–117)
GLUCOSE BLD STRIP.AUTO-MCNC: 145 MG/DL (ref 65–117)
GLUCOSE BLD STRIP.AUTO-MCNC: 172 MG/DL (ref 65–117)
GLUCOSE SERPL-MCNC: 130 MG/DL (ref 65–100)
HCT VFR BLD AUTO: 27.5 % (ref 35–47)
HGB BLD-MCNC: 8.8 G/DL (ref 11.5–16)
IMM GRANULOCYTES # BLD AUTO: 0 K/UL (ref 0–0.04)
IMM GRANULOCYTES NFR BLD AUTO: 0 % (ref 0–0.5)
LYMPHOCYTES # BLD: 0.6 K/UL (ref 0.8–3.5)
LYMPHOCYTES NFR BLD: 9 % (ref 12–49)
MCH RBC QN AUTO: 32.1 PG (ref 26–34)
MCHC RBC AUTO-ENTMCNC: 32 G/DL (ref 30–36.5)
MCV RBC AUTO: 100.4 FL (ref 80–99)
MONOCYTES # BLD: 0.6 K/UL (ref 0–1)
MONOCYTES NFR BLD: 8 % (ref 5–13)
NEUTS SEG # BLD: 5.6 K/UL (ref 1.8–8)
NEUTS SEG NFR BLD: 80 % (ref 32–75)
NRBC # BLD: 0 K/UL (ref 0–0.01)
NRBC BLD-RTO: 0 PER 100 WBC
PLATELET # BLD AUTO: 234 K/UL (ref 150–400)
PMV BLD AUTO: 8.7 FL (ref 8.9–12.9)
POTASSIUM SERPL-SCNC: 4 MMOL/L (ref 3.5–5.1)
RBC # BLD AUTO: 2.74 M/UL (ref 3.8–5.2)
RBC MORPH BLD: ABNORMAL
SERVICE CMNT-IMP: ABNORMAL
SODIUM SERPL-SCNC: 137 MMOL/L (ref 136–145)
WBC # BLD AUTO: 7 K/UL (ref 3.6–11)

## 2022-01-16 PROCEDURE — 96366 THER/PROPH/DIAG IV INF ADDON: CPT

## 2022-01-16 PROCEDURE — 74011636637 HC RX REV CODE- 636/637: Performed by: HOSPITALIST

## 2022-01-16 PROCEDURE — 74011250637 HC RX REV CODE- 250/637: Performed by: HOSPITALIST

## 2022-01-16 PROCEDURE — 94760 N-INVAS EAR/PLS OXIMETRY 1: CPT

## 2022-01-16 PROCEDURE — 74011000250 HC RX REV CODE- 250: Performed by: NURSE PRACTITIONER

## 2022-01-16 PROCEDURE — G0378 HOSPITAL OBSERVATION PER HR: HCPCS

## 2022-01-16 PROCEDURE — 82962 GLUCOSE BLOOD TEST: CPT

## 2022-01-16 PROCEDURE — 74011250637 HC RX REV CODE- 250/637: Performed by: INTERNAL MEDICINE

## 2022-01-16 PROCEDURE — 77010033678 HC OXYGEN DAILY

## 2022-01-16 PROCEDURE — 74011000250 HC RX REV CODE- 250: Performed by: HOSPITALIST

## 2022-01-16 PROCEDURE — 85025 COMPLETE CBC W/AUTO DIFF WBC: CPT

## 2022-01-16 PROCEDURE — 65660000001 HC RM ICU INTERMED STEPDOWN

## 2022-01-16 PROCEDURE — 80048 BASIC METABOLIC PNL TOTAL CA: CPT

## 2022-01-16 PROCEDURE — 74011000258 HC RX REV CODE- 258: Performed by: HOSPITALIST

## 2022-01-16 PROCEDURE — 96365 THER/PROPH/DIAG IV INF INIT: CPT

## 2022-01-16 PROCEDURE — 71045 X-RAY EXAM CHEST 1 VIEW: CPT

## 2022-01-16 PROCEDURE — 96376 TX/PRO/DX INJ SAME DRUG ADON: CPT

## 2022-01-16 PROCEDURE — 99233 SBSQ HOSP IP/OBS HIGH 50: CPT | Performed by: INTERNAL MEDICINE

## 2022-01-16 RX ORDER — DEXTROSE 50 % IN WATER (D50W) INTRAVENOUS SYRINGE
12.5-25 AS NEEDED
Status: DISCONTINUED | OUTPATIENT
Start: 2022-01-16 | End: 2022-01-27 | Stop reason: HOSPADM

## 2022-01-16 RX ORDER — DILTIAZEM HYDROCHLORIDE 5 MG/ML
10 INJECTION INTRAVENOUS ONCE
Status: DISCONTINUED | OUTPATIENT
Start: 2022-01-16 | End: 2022-01-16

## 2022-01-16 RX ORDER — ISOSORBIDE DINITRATE 20 MG/1
20 TABLET ORAL 2 TIMES DAILY
Status: DISCONTINUED | OUTPATIENT
Start: 2022-01-16 | End: 2022-01-17

## 2022-01-16 RX ORDER — INSULIN LISPRO 100 [IU]/ML
INJECTION, SOLUTION INTRAVENOUS; SUBCUTANEOUS
Status: DISCONTINUED | OUTPATIENT
Start: 2022-01-16 | End: 2022-01-27 | Stop reason: HOSPADM

## 2022-01-16 RX ORDER — DILTIAZEM HYDROCHLORIDE 5 MG/ML
10 INJECTION INTRAVENOUS ONCE
Status: DISPENSED | OUTPATIENT
Start: 2022-01-16 | End: 2022-01-16

## 2022-01-16 RX ORDER — INSULIN LISPRO 100 [IU]/ML
INJECTION, SOLUTION INTRAVENOUS; SUBCUTANEOUS
Status: DISCONTINUED | OUTPATIENT
Start: 2022-01-16 | End: 2022-01-16

## 2022-01-16 RX ORDER — BUMETANIDE 0.25 MG/ML
2 INJECTION INTRAMUSCULAR; INTRAVENOUS ONCE
Status: COMPLETED | OUTPATIENT
Start: 2022-01-16 | End: 2022-01-16

## 2022-01-16 RX ORDER — MAGNESIUM SULFATE 100 %
4 CRYSTALS MISCELLANEOUS AS NEEDED
Status: DISCONTINUED | OUTPATIENT
Start: 2022-01-16 | End: 2022-01-27 | Stop reason: HOSPADM

## 2022-01-16 RX ORDER — HYDRALAZINE HYDROCHLORIDE 25 MG/1
37.5 TABLET, FILM COATED ORAL 2 TIMES DAILY
Status: DISCONTINUED | OUTPATIENT
Start: 2022-01-16 | End: 2022-01-17

## 2022-01-16 RX ADMIN — CARBIDOPA AND LEVODOPA 2 TABLET: 25; 100 TABLET ORAL at 08:20

## 2022-01-16 RX ADMIN — GABAPENTIN 100 MG: 100 CAPSULE ORAL at 21:58

## 2022-01-16 RX ADMIN — BUMETANIDE 1 MG: 0.25 INJECTION INTRAMUSCULAR; INTRAVENOUS at 18:25

## 2022-01-16 RX ADMIN — BUMETANIDE 1 MG: 0.25 INJECTION INTRAMUSCULAR; INTRAVENOUS at 08:21

## 2022-01-16 RX ADMIN — Medication 1000 UNITS: at 08:20

## 2022-01-16 RX ADMIN — DULOXETINE 120 MG: 60 CAPSULE, DELAYED RELEASE ORAL at 08:20

## 2022-01-16 RX ADMIN — CLOPIDOGREL 75 MG: 75 TABLET, FILM COATED ORAL at 08:20

## 2022-01-16 RX ADMIN — BUSPIRONE HYDROCHLORIDE 5 MG: 5 TABLET ORAL at 21:57

## 2022-01-16 RX ADMIN — CARBIDOPA AND LEVODOPA 2 TABLET: 25; 100 TABLET ORAL at 11:58

## 2022-01-16 RX ADMIN — BUMETANIDE 2 MG: 0.25 INJECTION INTRAMUSCULAR; INTRAVENOUS at 05:16

## 2022-01-16 RX ADMIN — VITAM B12 100 MCG: 100 TAB at 08:21

## 2022-01-16 RX ADMIN — PANTOPRAZOLE SODIUM 40 MG: 40 TABLET, DELAYED RELEASE ORAL at 08:20

## 2022-01-16 RX ADMIN — CARBIDOPA AND LEVODOPA 2 TABLET: 25; 100 TABLET ORAL at 21:57

## 2022-01-16 RX ADMIN — ATORVASTATIN CALCIUM 40 MG: 40 TABLET, FILM COATED ORAL at 21:57

## 2022-01-16 RX ADMIN — CARVEDILOL 37.5 MG: 12.5 TABLET, FILM COATED ORAL at 08:20

## 2022-01-16 RX ADMIN — HYDRALAZINE HYDROCHLORIDE 37.5 MG: 25 TABLET, FILM COATED ORAL at 18:25

## 2022-01-16 RX ADMIN — CILOSTAZOL 100 MG: 50 TABLET ORAL at 08:20

## 2022-01-16 RX ADMIN — ARIPIPRAZOLE 20 MG: 5 TABLET ORAL at 08:20

## 2022-01-16 RX ADMIN — BUSPIRONE HYDROCHLORIDE 5 MG: 5 TABLET ORAL at 15:42

## 2022-01-16 RX ADMIN — CARBIDOPA AND LEVODOPA 2 TABLET: 25; 100 TABLET ORAL at 15:48

## 2022-01-16 RX ADMIN — DILTIAZEM HYDROCHLORIDE 5 MG/HR: 5 INJECTION INTRAVENOUS at 08:30

## 2022-01-16 RX ADMIN — CILOSTAZOL 100 MG: 50 TABLET ORAL at 15:42

## 2022-01-16 RX ADMIN — DILTIAZEM HYDROCHLORIDE 30 MG: 30 TABLET, FILM COATED ORAL at 18:25

## 2022-01-16 RX ADMIN — BUSPIRONE HYDROCHLORIDE 5 MG: 5 TABLET ORAL at 08:20

## 2022-01-16 RX ADMIN — CARVEDILOL 37.5 MG: 12.5 TABLET, FILM COATED ORAL at 16:02

## 2022-01-16 RX ADMIN — ISOSORBIDE DINITRATE 20 MG: 20 TABLET ORAL at 18:25

## 2022-01-16 RX ADMIN — Medication 2 UNITS: at 18:26

## 2022-01-16 NOTE — PROGRESS NOTES
Overnight Hospitalist Progress Note    Name: Verena Hoffman  YOB: 1940  MRN: [id number]  Admission Date: 1/15/2022    Date of service: 01/16/22, 4:31 AM          ____________________________________________________________________________                               [id number]: Rapid response called by [facility] nursing staff for acute hypoxia. Patient had been stable and resting comfortably on 4 LNC with saturation 94% when she had acute desaturation into the 80s. Patient has no complaints, denies chest pain and shortness of breath. Resting comfortably in the bed. In no distress  Respiratory rate 24, even and unlabored. No accessory muscle use, crackles left mid to base, right side essentially clear with only scattered crackles  Abdomen soft, nontender not distended  Bilateral lower extremities with trace edema left greater than right                                                                            Patient is an 44-year-old female with a PMH significant for HTN, CKD, A. fib, CAD and CHF who was readmitted yesterday for nursing home with shortness of breath. She had just been discharged after an admission for respiratory failure and diastolic heart failure. Cardiology following    CXR on admission showed persistent pulmonary edema pattern    Patient Vitals for the past 12 hrs:   Temp Pulse Resp BP SpO2   01/16/22 0553 -- (!) 114 -- -- --   01/16/22 0516 -- (!) 116 -- (!) 168/97 --   01/16/22 0430 -- -- -- -- 95 %   01/16/22 0357 -- (!) 109 -- -- --   01/16/22 0152 -- (!) 105 -- -- --   01/15/22 2200 -- (!) 109 -- -- --   01/15/22 2000 -- (!) 145 -- -- --   01/15/22 1933 98.7 °F (37.1 °C) (!) 122 22 (!) 121/90 91 %     Acute on chronic hypoxic respiratory failure  History CHF  History A. fib with RVR. On Coreg and Cardizem  Cardiomyopathy.   LVEF 40 to 45%  CRF  CAD                                  Supportive supplemental oxygen to maintain SPO2 greater than 94%  Bumex 2 mg IV now  Repeat CXR  Consider BiPAP if diuresis alone is not effective  Continuous cardiac and pulse ox monitoring  ____________________________________________________________________________    Surjit Rader, CAROLANN, RN, NP-C  [phone] or via [address] [place] [address]

## 2022-01-16 NOTE — PROGRESS NOTES
Verbal bedside report given to [facility], RN oncoming nurse by Jonatan Butler. Chloe Ortega RN off-going nurse. Report included current pt status and condition, recent results, hx of present illness, heart rate and rhythm, and respiratory status. Transferred In:     Verbal report received by MARCI Ortega RN from [facility]  on pt incoming from ED for progression of care. Report consisted of SBAR, Kardex, ED Summary and Cardiac Rhythm. Patient arrived on IMCU via stretcher. Patient oriented to room and call bell, vital signs obtained. Opportunity for questions and clarification was provided. Assessment completed.

## 2022-01-16 NOTE — PROGRESS NOTES
Primary Nurse Brayan Barksdale RN and Zarina Mcclain RN performed a dual skin assessment on this patient Impairment noted- see wound doc flow sheet  Gregorio score is 16

## 2022-01-16 NOTE — PROGRESS NOTES
[id number]  Hospitalist Group                                                                                          Hospitalist Progress Note  Ignacio Harris MD  Answering service: [phone] OR [phone] from in house phone        Date of Service:  2022  NAME:  Clarence Haro  :  1940  MRN:  [id number]      Admission Summary:   [de-identified] yo female who was discharged from hospital on 1/15 after being admitted for CHF, Anemia/AFib/MORENO. Patient was on diuretics but then held on  due to hypotension. Pt had anemia and given 1 U PRBCs on . Pt's Eliquis stopped due to concern for bleeding. Patient's diuretics were not restarted and discharged to [facility]. Patient sent back to ER due to SOB.      Interval history / Subjective:     Pt seen and examined  RRT overnight for hypoxia and afib with RVR', Bumex 2mg IV given during RRT  Was on 10L, decreased to 7L this AM   No chest pain   Sob better today   HR in 140s this AM , placed on Cardizem gtt     Assessment & Plan:     Atrial Fib with RVR  - on Coreg  - start Cardizem gtt for rate control   - wean gtt as able   - cardiology following  - not on [address] due to concerns for bleeding during previous admission     Acute respiratory failure with hypoxia due to Pulmonary edema  Acute on chronic systolic CHF  - c/w IV bumex  - c/w coreg  - no ACEI/ARBs due to MORENO     CKD 4  - c/w diuretics  - renal on board    Parkinson's   - c/w sinemet     CAD s/p CABG/stent  - c/w ASA/Plavix/Statin/BB    HTN   - stable BP      Code status: DNR   DVT prophylaxis: SCDs    Care Plan discussed with: Patient/Family, Nurse and   Anticipated Disposition: SNF/LTC  Anticipated Discharge: Greater than 48 hours     Hospital Problems  Date Reviewed: 2022          Codes Class Noted POA    CHF exacerbation ([facility] ICD-10-CM: I50.9  ICD-9-CM: 428.0  1/15/2022 Unknown                Review of Systems:   A comprehensive review of systems was negative except for that written in the HPI. Vital Signs:    Last 24hrs VS reviewed since prior progress note. Most recent are:  Visit Vitals  BP (!) 168/97   Pulse 78   Temp 98.7 °F (37.1 °C)   Resp 22   Ht 5' 5\" (1.651 m)   Wt 75 kg (165 lb 5.5 oz)   SpO2 97%   BMI 27.51 kg/m²         Intake/Output Summary (Last 24 hours) at 1/16/2022 1129  Last data filed at 1/16/2022 0601  Gross per 24 hour   Intake 80 ml   Output 675 ml   Net -595 ml        Physical Examination:     I had a face to face encounter with this patient and independently examined them on 1/16/2022 as outlined below:          Constitutional:  No acute distress, cooperative, pleasant    ENT:  Oral mucosa moist, oropharynx benign. Resp:  decreased BS with rales at bases   CV:  irregularly irregular     GI:  Soft, non distended, non tender. normoactive bowel sounds,     Musculoskeletal:  No edema, warm, 2+ pulses throughout    Neurologic:  Moves all extremities. AAOx3, CN II-XII reviewed            Data Review:    Review and/or order of clinical lab test  Review and/or order of tests in the radiology section of CPT  Review and/or order of tests in the medicine section of CPT      Labs:     Recent Labs     01/16/22  0745 01/15/22  1013   WBC 7.0 11.2*   HGB 8.8* 8.7*   HCT 27.5* 27.9*    244     Recent Labs     01/16/22  0745 01/15/22  1013 01/14/22  0422    137 137   K 4.0 4.5 4.1    106 106   CO2 27 28 26   BUN 44* 44* 43*   CREA 1.93* 2.09* 1.69*   * 168* 85   CA 8.9 9.0 9.1     Recent Labs     01/15/22  1013   ALT 8*   AP 84   TBILI 0.5   TP 6.2*   ALB 3.2*   GLOB 3.0     No results for input(s): INR, PTP, APTT, INREXT in the last 72 hours. No results for input(s): FE, TIBC, PSAT, FERR in the last 72 hours. Lab Results   Component Value Date/Time    Folate 7.8 12/14/2021 04:40 AM      No results for input(s): PH, PCO2, PO2 in the last 72 hours. No results for input(s): CPK, CKNDX, TROIQ in the last 72 hours.     No lab exists for component: CPKMB  Lab Results   Component Value Date/Time    Cholesterol, total 148 09/23/2020 04:27 AM    HDL Cholesterol 52 09/23/2020 04:27 AM    LDL, calculated 83.8 09/23/2020 04:27 AM    Triglyceride 61 09/23/2020 04:27 AM    CHOL/HDL Ratio 2.8 09/23/2020 04:27 AM     Lab Results   Component Value Date/Time    Glucose (POC) 137 (H) 01/16/2022 04:16 AM    Glucose (POC) 110 01/13/2022 03:35 PM    Glucose (POC) 108 12/21/2021 06:15 AM    Glucose (POC) 148 (H) 12/20/2021 09:54 PM    Glucose (POC) 156 (H) 12/18/2021 11:53 AM     Lab Results   Component Value Date/Time    Color YELLOW/STRAW 12/30/2021 08:23 PM    Appearance CLOUDY (A) 12/30/2021 08:23 PM    Specific gravity 1.017 12/30/2021 08:23 PM    pH (UA) 6.0 12/30/2021 08:23 PM    Protein 100 (A) 12/30/2021 08:23 PM    Glucose Negative 12/30/2021 08:23 PM    Ketone Negative 12/30/2021 08:23 PM    Bilirubin Negative 12/30/2021 08:23 PM    Urobilinogen 1.0 12/30/2021 08:23 PM    Nitrites Negative 12/30/2021 08:23 PM    Leukocyte Esterase MODERATE (A) 12/30/2021 08:23 PM    Epithelial cells FEW 12/30/2021 08:23 PM    Bacteria 1+ (A) 12/30/2021 08:23 PM    WBC 0-4 12/30/2021 08:23 PM    RBC 0-5 12/30/2021 08:23 PM         Medications Reviewed:     Current Facility-Administered Medications   Medication Dose Route Frequency    dilTIAZem (CARDIZEM) 125 mg in dextrose 5% 125 mL infusion  0-15 mg/hr IntraVENous TITRATE    dilTIAZem (CARDIZEM) injection 10 mg  10 mg IntraVENous ONCE    ARIPiprazole (ABILIFY) tablet 20 mg  20 mg Oral DAILY    atorvastatin (LIPITOR) tablet 40 mg  40 mg Oral QHS    busPIRone (BUSPAR) tablet 5 mg  5 mg Oral TID    carbidopa-levodopa (SINEMET)  mg per tablet 2 Tablet  2 Tablet Oral QID    cholecalciferol (VITAMIN D3) (1000 Units /25 mcg) tablet 1,000 Units  1,000 Units Oral DAILY    cilostazoL (PLETAL) tablet 100 mg  100 mg Oral ACB&D    clopidogreL (PLAVIX) tablet 75 mg  75 mg Oral DAILY AFTER BREAKFAST    [Held by provider] dilTIAZem IR (CARDIZEM) tablet 30 mg  30 mg Oral TIDAC    cyanocobalamin (VITAMIN B12) tablet 100 mcg  100 mcg Oral DAILY    DULoxetine (CYMBALTA) capsule 120 mg  120 mg Oral DAILY    gabapentin (NEURONTIN) capsule 100 mg  100 mg Oral QHS    nitroglycerin (NITROSTAT) tablet 0.4 mg  0.4 mg SubLINGual Q5MIN PRN    pantoprazole (PROTONIX) tablet 40 mg  40 mg Oral ACB    polyethylene glycol (MIRALAX) packet 17 g  17 g Oral DAILY PRN    senna-docusate (PERICOLACE) 8.6-50 mg per tablet 1 Tablet  1 Tablet Oral QHS    bumetanide (BUMEX) injection 1 mg  1 mg IntraVENous BID    albuterol-ipratropium (DUO-NEB) 2.5 MG-0.5 MG/3 ML  3 mL Nebulization Q6H PRN    carvediloL (COREG) tablet 37.5 mg  37.5 mg Oral BID WITH MEALS     ______________________________________________________________________  EXPECTED LENGTH OF STAY: - - -  ACTUAL LENGTH OF STAY:          0                 Roman Ayala MD

## 2022-01-16 NOTE — PROGRESS NOTES
Occupational Therapy:    Chart reviewed and attempted to see for OT session, however patient with RR for hypoxia and increased O2 requirements this morning. Will continue to follow up and attempt as able.      Terrial Mins, OT

## 2022-01-16 NOTE — PROGRESS NOTES
Verbal bedside report given to Jose Martin Davalos RN oncoming nurse by Lilliam Dickens. Jeffrey Barksdale RN off-going nurse. Report included current pt status and condition, recent results, hx of present illness, heart rate and rhythm, and respiratory status. 1600  Dilt gtt d/c 1530, pt heart rate 82 beats per minute. 8978  Contacted MD regarding elevated heart rate, orders received for dilt gtt.

## 2022-01-16 NOTE — PROGRESS NOTES
0420  Pt's HR in the 150s, tachypneic, respirations in upper 20s, /149. Currently on 5L NC + NRB, satting in upper 80s. Requested pt to take deep breaths. Rapid response called. MD ordered Bumex 2mg IV push and repeat CXR. MD noted BIPAP if diuresis not effective. Will continue to monitor.

## 2022-01-16 NOTE — CONSULTS
[address] [place] [address] Consultation Note     Subjective:      Damari Archer is a [de-identified] y.o. patient who is seen for evaluation of CHF and AFIB  She had been seen by Dr Vinh Werner and Dr Harshad Sue on previous admissions  She has renal failure and hypotension so diuretic was stopped  She was anemic so no anticoagulation   She was [facility] to [facility] for 1 day and came back to ER with dyspnea and hypoxemia 74% O2 sat   She has been seen by nephrologist and is back on bumex  Tonight her bp is still elevated and she said she is fine now    past medical history remarkable for hypertension, atrial fibrillation, coronary disease status post CABG.  Status post PCI of native LAD in April 2021.  She does have severe three-vessel disease with a patent LIMA to the LAD and a patent graft sequential to the first diagonal obtuse marginal branch.     Also history of diastolic dysfunction and fluctuating ejection fraction.      left bundle branch block       ECHO ADULT FOLLOW-UP OR LIMITED 01/09/2022 1/9/2022    Interpretation Summary    Left Ventricle: Left ventricle size is normal. Mildly increased wall thickness. Mild global hypokinesis present. Mildly reduced left ventricular systolic function with a visually estimated EF of 40 - 45%.   Mitral Valve: Moderate posterior mitral annular calcification.   Pericardium: Left pleural effusion.   Contrast used: Definity.     Signed by: Catracho Casey MD on 1/9/2022  2:52 PM    12/2021: LVEF 50-55%, mild AS, mild to moderate MR    Patient Active Problem List   Diagnosis Code    Hypotension I95.9    Coronary artery disease I25.10    S/P coronary artery stent placement Z95.5    Renal failure N19    Elevated troponin R77.8    Pericardial effusion I31.3    Lactic acidosis E87.2    AF [facility]atrial fibrillation) ([facility]) I48.91    Anemia D64.9    GI bleed K92.2    Syncope R55    Bradycardia R00.1    Acute kidney injury ([address] N17.9    Postoperative anemia due to acute blood loss D62    S/P CABG (coronary artery bypass graft) Z95.1    Edema R60.9    Diabetes mellitus (HCC) E11.9    CKD (chronic kidney disease), stage III ([facility]) N18.30    Fall W19. Silvina Servant    Acute ischemic stroke ([facility] [address] I63.9    HTN (hypertension) I10    Carotid artery stenosis, symptomatic, left I65.22    Generalized weakness R53.1    Weakness R53.1    UTI (urinary tract infection) N39.0    SOB (shortness of breath) R06.02    Cellulitis and abscess of lower extremity L03.119, L02.419    CAP (community acquired pneumonia) J18.9    Acute on chronic respiratory failure with hypoxia ([facility]) J96.21    CHF (congestive heart failure) ([facility]) I50.9    CHF exacerbation ([facility]) I50.9     Current Facility-Administered Medications   Medication Dose Route Frequency Provider Last Rate Last Admin    [START ON 1/16/2022] ARIPiprazole (ABILIFY) tablet 20 mg  20 mg Oral DAILY Laverne Damian MD        atorvastatin (LIPITOR) tablet 40 mg  40 mg Oral QHS Laverne Damian MD        busPIRone (BUSPAR) tablet 5 mg  5 mg Oral TID Matilda Her MD   5 mg at 01/15/22 1736    carbidopa-levodopa (SINEMET)  mg per tablet 2 Tablet  2 Tablet Oral QID Laverne Damian MD   2 Tablet at 01/15/22 1254    carvediloL (COREG) tablet 25 mg  25 mg Oral BID WITH MEALS Laverne Damian MD       [address] [place] [address] ON 1/16/2022] cholecalciferol (VITAMIN D3) (1000 Units /25 mcg) tablet 1,000 Units  1,000 Units Oral DAILY Laverne Damian MD        cilostazoL (PLETAL) tablet 100 mg  100 mg Oral ACB&D Laverne Damian MD   100 mg at 01/15/22 1736    [START ON 1/16/2022] clopidogreL (PLAVIX) tablet 75 mg  75 mg Oral DAILY AFTER [place] MD        dilTIAZem IR (CARDIZEM) tablet 30 mg  30 mg Oral Eric Colon MD       [address] [place] [address] ON 1/16/2022] cyanocobalamin (VITAMIN B12) tablet 100 mcg  100 mcg Oral DAILY Laverne Damian MD       [address] [place] [address] Older ON 1/16/2022] DULoxetine (CYMBALTA) capsule 120 mg  120 mg Oral DAILY Laverne Damian MD        gabapentin (NEURONTIN) capsule 100 mg  100 mg Oral QHS Laverne Damian MD       [address] [place] [address] nitroglycerin (NITROSTAT) tablet 0.4 mg  0.4 mg SubLINGual Q5MIN PRN Enriqueta Sherwood MD       Tovar [START ON 1/16/2022] pantoprazole (PROTONIX) tablet 40 mg  40 mg Oral ACB Enriqueta Sherwood MD        polyethylene glycol [facility]) packet 17 g  17 g Oral DAILY PRN Enriqueta Sherwood MD        senna-docusate (PERICOLACE) 8.6-50 mg per tablet 1 Tablet  1 Tablet Oral QHS Enriqueta Sherwood MD        bumetanide Marito Moat) injection 1 mg  1 mg IntraVENous BID Matilda Her MD   1 mg at 01/15/22 1735    albuterol-ipratropium (DUO-NEB) 2.5 MG-0.5 MG/3 ML  3 mL Nebulization Q6H PRN Enriqueta Sherwood MD         No Known Allergies  Past Medical History:   Diagnosis Date    Anxiety disorder     Atrial fibrillation ([address]     CAD (coronary artery disease) 2007    stents, CABG x 3v    Carotid stenosis     Cervical stenosis of spinal canal 07/2019    Chronic kidney disease     Cough     CVA (cerebral vascular accident) ([address] 07/2019    left lacunar infarct at head of caudate    Depression     AND CHRONIC ANXIETY    Diabetes ([address]     GERD (gastroesophageal reflux disease)     High cholesterol     History of peptic ulcer     Bleeding ulcer with increased NSAID use    Hypertension     Left carotid stenosis 07/2019    s/p left CEA with Dr. Mason Chow, old 2007    PUD (peptic ulcer disease)     Stroke ([address]     Tremor     Valvular heart disease      Past Surgical History:   Procedure Laterality Date    COLONOSCOPY N/A 12/17/2021    COLONOSCOPY   :- [facility] R[facility] ENDOSCOPY    HX CAROTID ENDARTERECTOMY  07/2019    HX CORONARY ARTERY BYPASS GRAFT      HX TONSILLECTOMY  1963    MS CARDIAC SURG PROCEDURE UNLIST      CABG X3 VESSEL    MS TOTAL KNEE ARTHROPLASTY Right 2015     Family History   Problem Relation Age of Onset    Heart Attack Mother 72        Dec 79yo    Hypertension Mother     Other Father         Unknown    Parkinson's Disease Brother     Anesth Problems Neg Hx      Social History     Tobacco Use    Smoking status: Former Smoker     Packs/day: 0.25     Years: 5.00     Pack years: 1.25     Types: Cigarettes     Quit date:      Years since quittin.0    Smokeless tobacco: Never Used   Substance Use Topics    Alcohol use: Not Currently     Comment: Rare        Review of Systems:   Constitutional: Negative for fever, chills, weight loss, + malaise/fatigue. HEENT: Negative for nosebleeds, vision changes. Respiratory: Negative for cough, hemoptysis  Cardiovascular: Negative for chest pain, palpitations, orthopnea, claudication, leg swelling, syncope, and PND. Gastrointestinal: Negative for nausea, vomiting, diarrhea, blood in stool and melena. Genitourinary: Negative for dysuria, and hematuria. Musculoskeletal: Negative for myalgias, arthralgia. Skin: Negative for rash. Heme: Does not bleed or bruise easily. Neurological: Negative for speech change and focal weakness     Objective:     Visit Vitals  BP (!) 121/90 (BP 1 Location: Right upper arm, BP Patient Position: Sitting)   Pulse (!) 122   Temp 98.7 °F (37.1 °C)   Resp 22   Ht 5' 5\" (1.651 m)   Wt 165 lb 5.5 oz (75 kg)   SpO2 91%   BMI 27.51 kg/m²      Physical Exam:   Constitutional: well-developed and well-nourished. No respiratory distress. Head: Normocephalic and atraumatic. Eyes: Pupils are equal, round  ENT: hearing normal  Neck: supple. No JVD present. Cardiovascular: fast rate, irregular rhythm. Exam reveals no gallop and no friction rub. No murmur heard. Pulmonary/Chest: Effort normal and breath sounds normal. No wheezes. Abdominal: Soft, no tenderness. Musculoskeletal: no edema. Neurological: alert, oriented. Skin: Skin is warm and dry  Psychiatric: normal mood and affect.  Behavior is normal. Judgment and thought content normal.      EKG: atrial fibrillation, rate       Assessment/Plan:   Atrial fibrillation with RVR: on coreg and cardizem tid  Chronic renal failure  Anemia  CAD/CABG/stent  Cardiomyopathy with LVEF 40-45%  LBBB  Mild AS  Mild to moderate MR    I agree with diuretic bumex since she has pulmonary edema on chest xray  Ventricular rate needs more controlled: I prefer coreg and will try to take her off cardizem later  Will increase coreg to 37.5 mg bid since her bp was high  No anticoagulant due to anemia and risk of GI bleeding from previous admission  She will continue with plavix  If rate cannot be controlled, will consider biv pacer and av node ablation     Thank you for involving me in this patient's care and please call with further concerns or questions. Sarah Beltran M.D.   Electrophysiology/Cardiology  [facility] [place]  [address] [place] [address]                                [phone]

## 2022-01-16 NOTE — PROGRESS NOTES
Bedside shift change report given to Shaniqua RN (oncoming nurse) by Quin Regalado RN (offgoing nurse). Report included the following information SBAR, Kardex, ED Summary, Intake/Output, MAR, Accordion, Recent Results, Med Rec Status, Cardiac Rhythm AFib w RVR and Alarm Parameters . Rapid called at 0430, see progress note.

## 2022-01-16 NOTE — PROGRESS NOTES
Referral received and acknowledged. The patient had a rapid called this morning and is not yet appropriate for therapy. Will follow up and eval when medically able.

## 2022-01-16 NOTE — PROGRESS NOTES
CARDIOLOGY Hospital Progress Note      Subjective:      Britni Collado is a [de-identified] y.o. patient who is seen for evaluation of CHF and AFIB RVR  She had been seen by Dr Brooke Magaña and Dr Jairo Correa on previous admissions  She has renal failure and hypotension so diuretic was stopped when she was discharged from [address] [place] [address] last time  She was anemic so no anticoagulation   She was [facility] [place] [facility] 1 day and came back to ER with dyspnea and hypoxemia 74% O2 sat   She has been seen by nephrologist and is back on bumex      Breathing is now better  Rate is controlled on cardizem IV and coreg PO     past medical history remarkable for hypertension, atrial fibrillation, coronary disease status post CABG.  Status post PCI of native LAD in April 2021.  She does have severe three-vessel disease with a patent LIMA to the LAD and a patent graft sequential to the first diagonal obtuse marginal branch.     Also history of diastolic dysfunction and fluctuating ejection fraction.      left bundle branch block       ECHO ADULT FOLLOW-UP OR LIMITED 01/09/2022 1/9/2022     Interpretation Summary    Left Ventricle: Left ventricle size is normal. Mildly increased wall thickness. Mild global hypokinesis present. Mildly reduced left ventricular systolic function with a visually estimated EF of 40 - 45%.   Mitral Valve: Moderate posterior mitral annular calcification.   Pericardium: Left pleural effusion.     Contrast used: Definity.     Signed by: Grady Ambrose MD on 1/9/2022  2:52 PM     12/2021: LVEF 50-55%, mild AS, mild to moderate MR          Patient Active Problem List   Diagnosis Code    Hypotension I95.9    Coronary artery disease I25.10    S/P coronary artery stent placement Z95.5    Renal failure N19    Elevated troponin R77.8    Pericardial effusion I31.3    Lactic acidosis E87.2    AF (atrial fibrillation) ([facility]) I48.91    Anemia D64.9    GI bleed K92.2    Syncope R55    Bradycardia R00.1    Acute kidney injury ([facility] N17.9    Postoperative anemia due to acute blood loss D62    S/P CABG (coronary artery bypass graft) Z95.1    Edema R60.9    Diabetes mellitus (HCC) E11.9    CKD (chronic kidney disease), stage III ([facility]) N18.30    Fall W19. Jake Drain    Acute ischemic stroke ([facility] I63.9    HTN (hypertension) I10    Carotid artery stenosis, symptomatic, left I65.22    Generalized weakness R53.1    Weakness R53.1    UTI (urinary tract infection) N39.0    SOB (shortness of breath) R06.02    Cellulitis and abscess of lower extremity L03.119, L02.419    CAP (community acquired pneumonia) J18.9    Acute on chronic respiratory failure with hypoxia ([facility]) J96.21    CHF (congestive heart failure) ([facility]) I50.9    CHF exacerbation ([facility]) I50.9   No Known Allergies              Current Facility-Administered Medications   Medication Dose Route Frequency Provider Last Rate Last Admin    [START ON 1/16/2022] ARIPiprazole (ABILIFY) tablet 20 mg  20 mg Oral DAILY Parish Whitfield MD        atorvastatin (LIPITOR) tablet 40 mg  40 mg Oral QHS Parish Whitfield MD        busPIRone (BUSPAR) tablet 5 mg  5 mg Oral TID Matilda Her MD   5 mg at 01/15/22 1736    carbidopa-levodopa (SINEMET)  mg per tablet 2 Tablet  2 Tablet Oral QID Parish Whitfield MD   2 Tablet at 01/15/22 1254    carvediloL (COREG) tablet 25 mg  25 mg Oral BID WITH MEALS MD Guy Jacobo Roro ON 1/16/2022] cholecalciferol (VITAMIN D3) (1000 Units /25 mcg) tablet 1,000 Units  1,000 Units Oral DAILY Parish Whitfield MD        cilostazoL (PLETAL) tablet 100 mg  100 mg Oral ACB&D Parish Whitfield MD   100 mg at 01/15/22 1736    [START ON 1/16/2022] clopidogreL (PLAVIX) tablet 75 mg  75 mg Oral DAILY AFTER Krysten Streeter MD        dilTIAZem IR (CARDIZEM) tablet 30 mg  30 mg Oral Remberto Merrill MD       Tovar Floy Roro ON 1/16/2022] cyanocobalamin (VITAMIN B12) tablet 100 mcg  100 mcg Oral DAILY MD Guy Jacobo ON 1/16/2022] DULoxetine (CYMBALTA) capsule 120 mg  120 mg Oral DAILY Parish Whitfield MD  gabapentin (NEURONTIN) capsule 100 mg  100 mg Oral QHS Ysabel Greco MD        nitroglycerin (NITROSTAT) tablet 0.4 mg  0.4 mg SubLINGual Q5MIN PRN Ysabel Greco MD       [facility] [START ON 1/16/2022] pantoprazole (PROTONIX) tablet 40 mg  40 mg Oral ACB Ysabel Greco MD        polyethylene glycol [facility]) packet 17 g  17 g Oral DAILY PRN Ysabel Greco MD        senna-docusate (PERICOLACE) 8.6-50 mg per tablet 1 Tablet  1 Tablet Oral QHS Ysabel Greco MD        bumetanide Maeola Nicely) injection 1 mg  1 mg IntraVENous BID Matilda Her MD   1 mg at 01/15/22 1735    albuterol-ipratropium (DUO-NEB) 2.5 MG-0.5 MG/3 ML  3 mL Nebulization Q6H PRN Ysabel Greco MD          No Known Allergies       Past Medical History:   Diagnosis Date    Anxiety disorder      Atrial fibrillation ([address]      CAD (coronary artery disease) 2007     stents, CABG x 3v    Carotid stenosis      Cervical stenosis of spinal canal 07/2019    Chronic kidney disease      Cough      CVA (cerebral vascular accident) ([address] 07/2019     left lacunar infarct at head of caudate    Depression       AND CHRONIC ANXIETY    Diabetes ([address]      GERD (gastroesophageal reflux disease)      High cholesterol      History of peptic ulcer       Bleeding ulcer with increased NSAID use    Hypertension      Left carotid stenosis 07/2019     s/p left CEA with Dr. Ariela Munson, old 2007    PUD (peptic ulcer disease)      Stroke ([facility]      Tremor      Valvular heart disease              Past Surgical History:   Procedure Laterality Date    COLONOSCOPY N/A 12/17/2021     COLONOSCOPY   :- performed [facility] Le[facility] ENDOSCOPY    HX CAROTID ENDARTERECTOMY   07/2019    HX CORONARY ARTERY BYPASS GRAFT        HX TONSILLECTOMY   1963    TX CARDIAC SURG PROCEDURE UNLIST         CABG X3 VESSEL    TX TOTAL KNEE ARTHROPLASTY Right 2015            Family History   Problem Relation Age of Onset    Heart Attack Mother 72         Dec 91yo    Hypertension Mother      Other Father           Unknown    Parkinson's Disease Brother      Anesth Problems Neg Hx        Social History            Tobacco Use    Smoking status: Former Smoker       Packs/day: 0.25       Years: 5.00       Pack years: 1.25       Types: Cigarettes       Quit date: 56       Years since quittin.0    Smokeless tobacco: Never Used   Substance Use Topics    Alcohol use: Not Currently       Comment: Rare         Review of Systems:   Constitutional: Negative for fever, chills, weight loss, + malaise/fatigue. HEENT: Negative for nosebleeds, vision changes. Respiratory: Negative for cough, hemoptysis  Cardiovascular: Negative for chest pain, palpitations, orthopnea, claudication, leg swelling, syncope, and PND. Gastrointestinal: Negative for nausea, vomiting, diarrhea, blood in stool and melena. Genitourinary: Negative for dysuria, and hematuria. Musculoskeletal: Negative for myalgias, arthralgia. Skin: Negative for rash. Heme: Does not bleed or bruise easily. Neurological: Negative for speech change and focal weakness     Objective:          Physical Exam:     Visit Vitals  BP (!) 168/97   Pulse 85   Temp 98.7 °F (37.1 °C)   Resp 22   Ht 5' 5\" (1.651 m)   Wt 165 lb 5.5 oz (75 kg)   SpO2 97%   BMI 27.51 kg/m²       Constitutional: well-developed and well-nourished. No respiratory distress. Head: Normocephalic and atraumatic. Eyes: Pupils are equal, round  ENT: hearing normal  Neck: supple. No JVD present. Cardiovascular: controlled rate, irregular rhythm. Exam reveals no gallop and no friction rub. No murmur heard. Pulmonary/Chest: Effort normal and breath sounds normal. No wheezes. Abdominal: Soft, no tenderness. Musculoskeletal: no edema. Neurological: alert, oriented. Skin: Skin is warm and dry  Psychiatric: normal mood and affect.  Behavior is normal. Judgment and thought content normal.       EKG: atrial fibrillation, rate controlled        Assessment/Plan:   Atrial fibrillation with RVR: on coreg and cardizem IV   Chronic renal failure  Anemia  CAD/CABG/stent  Cardiomyopathy with LVEF 40-45%  LBBB  Mild AS  Mild to moderate MR     I agree with diuretic bumex since she has pulmonary edema on chest xray and she is feeling better  Ventricular rate is more controlled: I prefer coreg and has increased dose and will try to take her off cardizem IV today as it is negative inotrope  No anticoagulant due to anemia and risk of GI bleeding from previous admission. Will need to be considered for NORRIS closure later when she is recovered from this acute CHF admission  She will continue with plavix for now  If V rate cannot be controlled, will consider biv pacer and av node ablation     For chronic systolic CHF and renal failure and high BP: avoid ACEI ARB ARNI but will start hydralazine/isordil along with coreg    I will ask Dr Dale Villegas to monitor her progress tomorrow     Thank you for involving me in this patient's care and please call with further concerns or questions.        Alonzo Noonan M.D.   Electrophysiology/Cardiology  [facility] [place]  [address] [place] [address]                                [phone]

## 2022-01-17 LAB
ANION GAP SERPL CALC-SCNC: 7 MMOL/L (ref 5–15)
BASOPHILS # BLD: 0.1 K/UL (ref 0–0.1)
BASOPHILS NFR BLD: 1 % (ref 0–1)
BNP SERPL-MCNC: ABNORMAL PG/ML
BUN SERPL-MCNC: 48 MG/DL (ref 6–20)
BUN/CREAT SERPL: 25 (ref 12–20)
CALCIUM SERPL-MCNC: 9.3 MG/DL (ref 8.5–10.1)
CHLORIDE SERPL-SCNC: 104 MMOL/L (ref 97–108)
CO2 SERPL-SCNC: 26 MMOL/L (ref 21–32)
CREAT SERPL-MCNC: 1.91 MG/DL (ref 0.55–1.02)
DIFFERENTIAL METHOD BLD: ABNORMAL
EOSINOPHIL # BLD: 0.3 K/UL (ref 0–0.4)
EOSINOPHIL NFR BLD: 6 % (ref 0–7)
ERYTHROCYTE [DISTWIDTH] IN BLOOD BY AUTOMATED COUNT: 13.4 % (ref 11.5–14.5)
GLUCOSE BLD STRIP.AUTO-MCNC: 103 MG/DL (ref 65–117)
GLUCOSE BLD STRIP.AUTO-MCNC: 104 MG/DL (ref 65–117)
GLUCOSE BLD STRIP.AUTO-MCNC: 135 MG/DL (ref 65–117)
GLUCOSE BLD STRIP.AUTO-MCNC: 185 MG/DL (ref 65–117)
GLUCOSE BLD STRIP.AUTO-MCNC: 397 MG/DL (ref 65–117)
GLUCOSE BLD STRIP.AUTO-MCNC: 65 MG/DL (ref 65–117)
GLUCOSE SERPL-MCNC: 106 MG/DL (ref 65–100)
HCT VFR BLD AUTO: 25.3 % (ref 35–47)
HGB BLD-MCNC: 8 G/DL (ref 11.5–16)
IMM GRANULOCYTES # BLD AUTO: 0 K/UL (ref 0–0.04)
IMM GRANULOCYTES NFR BLD AUTO: 0 % (ref 0–0.5)
LYMPHOCYTES # BLD: 0.8 K/UL (ref 0.8–3.5)
LYMPHOCYTES NFR BLD: 15 % (ref 12–49)
MAGNESIUM SERPL-MCNC: 2.1 MG/DL (ref 1.6–2.4)
MCH RBC QN AUTO: 32 PG (ref 26–34)
MCHC RBC AUTO-ENTMCNC: 31.6 G/DL (ref 30–36.5)
MCV RBC AUTO: 101.2 FL (ref 80–99)
MONOCYTES # BLD: 0.5 K/UL (ref 0–1)
MONOCYTES NFR BLD: 10 % (ref 5–13)
NEUTS SEG # BLD: 3.5 K/UL (ref 1.8–8)
NEUTS SEG NFR BLD: 68 % (ref 32–75)
NRBC # BLD: 0 K/UL (ref 0–0.01)
NRBC BLD-RTO: 0 PER 100 WBC
PLATELET # BLD AUTO: 225 K/UL (ref 150–400)
PMV BLD AUTO: 8.7 FL (ref 8.9–12.9)
POTASSIUM SERPL-SCNC: 4 MMOL/L (ref 3.5–5.1)
RBC # BLD AUTO: 2.5 M/UL (ref 3.8–5.2)
RBC MORPH BLD: ABNORMAL
RBC MORPH BLD: ABNORMAL
SERVICE CMNT-IMP: ABNORMAL
SERVICE CMNT-IMP: NORMAL
SODIUM SERPL-SCNC: 137 MMOL/L (ref 136–145)
WBC # BLD AUTO: 5.2 K/UL (ref 3.6–11)

## 2022-01-17 PROCEDURE — P9047 ALBUMIN (HUMAN), 25%, 50ML: HCPCS | Performed by: HOSPITALIST

## 2022-01-17 PROCEDURE — 97165 OT EVAL LOW COMPLEX 30 MIN: CPT

## 2022-01-17 PROCEDURE — 99232 SBSQ HOSP IP/OBS MODERATE 35: CPT | Performed by: INTERNAL MEDICINE

## 2022-01-17 PROCEDURE — 83880 ASSAY OF NATRIURETIC PEPTIDE: CPT

## 2022-01-17 PROCEDURE — 82272 OCCULT BLD FECES 1-3 TESTS: CPT

## 2022-01-17 PROCEDURE — 97161 PT EVAL LOW COMPLEX 20 MIN: CPT

## 2022-01-17 PROCEDURE — 85025 COMPLETE CBC W/AUTO DIFF WBC: CPT

## 2022-01-17 PROCEDURE — 74011250637 HC RX REV CODE- 250/637: Performed by: INTERNAL MEDICINE

## 2022-01-17 PROCEDURE — 97530 THERAPEUTIC ACTIVITIES: CPT

## 2022-01-17 PROCEDURE — 82962 GLUCOSE BLOOD TEST: CPT

## 2022-01-17 PROCEDURE — 77010033678 HC OXYGEN DAILY

## 2022-01-17 PROCEDURE — 74011000250 HC RX REV CODE- 250: Performed by: HOSPITALIST

## 2022-01-17 PROCEDURE — 94760 N-INVAS EAR/PLS OXIMETRY 1: CPT

## 2022-01-17 PROCEDURE — 83735 ASSAY OF MAGNESIUM: CPT

## 2022-01-17 PROCEDURE — 80048 BASIC METABOLIC PNL TOTAL CA: CPT

## 2022-01-17 PROCEDURE — 97535 SELF CARE MNGMENT TRAINING: CPT

## 2022-01-17 PROCEDURE — 74011250637 HC RX REV CODE- 250/637: Performed by: HOSPITALIST

## 2022-01-17 PROCEDURE — 74011636637 HC RX REV CODE- 636/637: Performed by: HOSPITALIST

## 2022-01-17 PROCEDURE — 74011250636 HC RX REV CODE- 250/636: Performed by: HOSPITALIST

## 2022-01-17 PROCEDURE — 65660000001 HC RM ICU INTERMED STEPDOWN

## 2022-01-17 RX ORDER — ALBUMIN HUMAN 250 G/1000ML
12.5 SOLUTION INTRAVENOUS EVERY 6 HOURS
Status: DISPENSED | OUTPATIENT
Start: 2022-01-17 | End: 2022-01-18

## 2022-01-17 RX ORDER — ISOSORBIDE DINITRATE 10 MG/1
10 TABLET ORAL 2 TIMES DAILY
Status: DISCONTINUED | OUTPATIENT
Start: 2022-01-17 | End: 2022-01-17

## 2022-01-17 RX ORDER — HYDRALAZINE HYDROCHLORIDE 25 MG/1
25 TABLET, FILM COATED ORAL 2 TIMES DAILY
Status: DISCONTINUED | OUTPATIENT
Start: 2022-01-17 | End: 2022-01-17

## 2022-01-17 RX ADMIN — BUSPIRONE HYDROCHLORIDE 5 MG: 5 TABLET ORAL at 08:34

## 2022-01-17 RX ADMIN — BUMETANIDE 1 MG: 0.25 INJECTION INTRAMUSCULAR; INTRAVENOUS at 08:37

## 2022-01-17 RX ADMIN — CARBIDOPA AND LEVODOPA 2 TABLET: 25; 100 TABLET ORAL at 08:34

## 2022-01-17 RX ADMIN — ATORVASTATIN CALCIUM 40 MG: 40 TABLET, FILM COATED ORAL at 20:16

## 2022-01-17 RX ADMIN — CILOSTAZOL 100 MG: 50 TABLET ORAL at 06:30

## 2022-01-17 RX ADMIN — VITAM B12 100 MCG: 100 TAB at 08:34

## 2022-01-17 RX ADMIN — CLOPIDOGREL 75 MG: 75 TABLET, FILM COATED ORAL at 08:34

## 2022-01-17 RX ADMIN — PANTOPRAZOLE SODIUM 40 MG: 40 TABLET, DELAYED RELEASE ORAL at 06:30

## 2022-01-17 RX ADMIN — BUSPIRONE HYDROCHLORIDE 5 MG: 5 TABLET ORAL at 21:33

## 2022-01-17 RX ADMIN — BUMETANIDE 1 MG: 0.25 INJECTION INTRAMUSCULAR; INTRAVENOUS at 17:44

## 2022-01-17 RX ADMIN — Medication 1000 UNITS: at 08:34

## 2022-01-17 RX ADMIN — BUSPIRONE HYDROCHLORIDE 5 MG: 5 TABLET ORAL at 16:35

## 2022-01-17 RX ADMIN — CARBIDOPA AND LEVODOPA 2 TABLET: 25; 100 TABLET ORAL at 17:44

## 2022-01-17 RX ADMIN — ALBUMIN (HUMAN) 12.5 G: 0.25 INJECTION, SOLUTION INTRAVENOUS at 20:16

## 2022-01-17 RX ADMIN — CARBIDOPA AND LEVODOPA 2 TABLET: 25; 100 TABLET ORAL at 21:33

## 2022-01-17 RX ADMIN — HYDRALAZINE HYDROCHLORIDE 37.5 MG: 25 TABLET, FILM COATED ORAL at 08:34

## 2022-01-17 RX ADMIN — DILTIAZEM HYDROCHLORIDE 30 MG: 30 TABLET, FILM COATED ORAL at 16:35

## 2022-01-17 RX ADMIN — CARVEDILOL 37.5 MG: 12.5 TABLET, FILM COATED ORAL at 08:34

## 2022-01-17 RX ADMIN — CARBIDOPA AND LEVODOPA 2 TABLET: 25; 100 TABLET ORAL at 12:59

## 2022-01-17 RX ADMIN — ISOSORBIDE DINITRATE 20 MG: 20 TABLET ORAL at 08:34

## 2022-01-17 RX ADMIN — Medication 12 UNITS: at 13:00

## 2022-01-17 RX ADMIN — DILTIAZEM HYDROCHLORIDE 30 MG: 30 TABLET, FILM COATED ORAL at 06:30

## 2022-01-17 RX ADMIN — CARVEDILOL 37.5 MG: 12.5 TABLET, FILM COATED ORAL at 16:35

## 2022-01-17 RX ADMIN — DULOXETINE 120 MG: 60 CAPSULE, DELAYED RELEASE ORAL at 08:34

## 2022-01-17 RX ADMIN — ARIPIPRAZOLE 20 MG: 5 TABLET ORAL at 08:34

## 2022-01-17 RX ADMIN — GABAPENTIN 100 MG: 100 CAPSULE ORAL at 21:33

## 2022-01-17 RX ADMIN — CILOSTAZOL 100 MG: 50 TABLET ORAL at 16:35

## 2022-01-17 NOTE — PROGRESS NOTES
Cardiology Progress Note        Admit Date: 1/15/2022  Admit Diagnosis: CHF exacerbation (HCC) [I50.9]  Acute respiratory failure with hypoxia ([facility]) [J96.01]  Pulmonary edema [J81.1]  Atrial fibrillation with RVR ([facility]) [I48.91]  Acute on chronic systolic and diastolic heart failure, NYHA class 4 ([facility] [I50.43]  Date: 1/17/2022     Time: 1:31 PM    HPI: Yulissa Martinez a [de-identified] y. o. patient who was seen on consult 1/16/22 for evaluation of CHF and AFIB RVR. She has PMH of HTN, atrial fibrillation, CAD status post CABG and Status post PCI of native LAD in April 2021.  She does have severe three-vessel disease with a patent LIMA to the LAD and a patent graft sequential to the first diagonal obtuse marginal branch. Additional history includes LBBB,, diastolic dysfunction and fluctuating LV function, CVA, PAD with L CEA, AAA and Parkinsons. Pt was discharged on 1/14/21 and diuretics were stopped during that hospitalization due to hypotension. She returned to ER on 1/15/21 with dyspnea and hypoxia. Subjective: Today, pt denies chest pain. Reports that her SOB has improved. Reports intermittent palpitations. She remains in afib, HR is controlled. diuresed 1 liter since admission     Assessment and Plan     1. Afib    -HR now controlled. -Coreg 37.5 mg BID   -diltiazem IR 30 mg TID   -Eliquis was held last admission due to marked anemia, unclear etiology   -TSH 0.84 10/30/21   -[id number]=1. Eliquis on hold since last admission due to anemia. May need to consider watchman as outpatient if unable to resume Eliquis. 2. Cardiomyopathy/acute on chronic HFmrEF   -NYHA IV on admission   -continue IV bumex 1 mg BID   -Continue coreg   -Avoid ace-I (MORENO on CKD)   -Stop hydralazine and isordil this afternoon as BP low     3. Mild-moderate MR    4. CAD   -s/p CABG.  s/p PCI LAD 4/2021   -No ischemia or infarction on nuclear stress test 22   -No chest pain. HS troponin this admission low (41)   -On plavix, statin, BB    5. HTN   -bp low this afternoon.    -coreg, hydralazine, imdur    6. History of LBBB    7. Anemia   -unclear etiology   -Hgb 8, trending down.    -heme negative stool noted last admission   -defer evaluation to primary team   -Eliquis on hold    8. MORENO on CKD   -baseline creatinine 1.6 (currently 1.9)  9. History of CVA   -on plavix, asa, statin  10. History of Carotid artery disease s/p Left CEA  11. Advanced directives: DNR    Pt admitted with chief c/o SOB which has improved. Continue IV diuretics. Hold off on [address] due to anemia of unclear etiology. BP low this afternoon so will stop isordil and hydralazine. Continue coreg for GDMT for cardiomyopathy and HR control. Saw and evaluated pt and agree with above assessment and plan. Trying to control HR with meds; pacer, AVN ablation if she does not respond to medications. Dr. Jimena Warren aware of pt. No OAC at this time, consider watchman outpt.  Diuretics per renal.   Hira Barrientos MD      PMH  Past Medical History:   Diagnosis Date    Anxiety disorder     Atrial fibrillation [facility])     CAD (coronary artery disease) 2007    stents, CABG x 3v    Carotid stenosis     Cervical stenosis of spinal canal 2019    Chronic kidney disease     Cough     CVA (cerebral vascular accident) ([address] 2019    left lacunar infarct at head of caudate    Depression     AND CHRONIC ANXIETY    Diabetes ([address]     GERD (gastroesophageal reflux disease)     High cholesterol     History of peptic ulcer     Bleeding ulcer with increased NSAID use    Hypertension     Left carotid stenosis 2019    s/p left CEA with Dr. Ang Morales, old 2007    PUD (peptic ulcer disease)     Stroke ([address]     Tremor     Valvular heart disease       Social Hx  Social History     Socioeconomic History    Marital status: SINGLE     Spouse name: Not on file    Number of children: Not on file    Years of education: Not on file    Highest education level: Not on file   Occupational History    Occupation: Retired realestate/teacher   Tobacco Use    Smoking status: Former Smoker     Packs/day: 0.25     Years: 5.00     Pack years: 1.25     Types: Cigarettes     Quit date:      Years since quittin.0    Smokeless tobacco: Never Used   Substance and Sexual Activity    Alcohol use: Not Currently     Comment: Rare    Drug use: No    Sexual activity: Not on file   Other Topics Concern    Not on file   Social History Narrative    Lives in [facility]     Social Determinants of Health     Financial Resource Strain:     Difficulty of Paying Living Expenses: Not on file   Food Insecurity:     Worried About [address] [employer] in the Last Year: Not on file    Juany of Food in the Last Year: Not on file   Transportation Needs:     Lack of Transportation (Medical): Not on file    Lack of Transportation (Non-Medical): Not on file   Physical Activity:     Days of Exercise per Week: Not on file    Minutes of Exercise per Session: Not on file   Stress:     Feeling of Stress : Not on file   Social Connections:     Frequency of Communication with Friends and Family: Not on file    Frequency of Social Gatherings with Friends and Family: Not on file    Attends Christian Services: Not on file    Active Member of [address] [place] [address] or Organizations: Not on file    Attends Club or Organization Meetings: Not on file    Marital Status: Not on file   Intimate Partner Violence:     Fear of Current or Ex-Partner: Not on file    Emotionally Abused: Not on file    Physically Abused: Not on file    Sexually Abused: Not on file   Housing Stability:     Unable to Pay for Housing in the Last Year: Not on file    Number of [place] in the Last Year: Not on file    Unstable Housing in the Last Year: Not on file       ROS:  Pertinent items are noted in HPI.     Objective:      Physical Exam:                Visit Vitals  BP (!) 90/52 (BP 1 Location: Right upper arm, BP Patient Position: At rest)   Pulse 88   Temp 97.9 °F (36.6 °C)   Resp 14   Ht 5' 5\" (1.651 m)   Wt 75 kg (165 lb 5.5 oz)   SpO2 98%   BMI 27.51 kg/m²          General Appearance:   Well developed, well nourished,alert and oriented x 3, and   individual in no acute distress. Ears/Nose/Mouth/Throat:    Hearing grossly normal.         Neck:  Supple. Chest:    Lungs crackles bilateral bases. Cardiovascular:   Irregularly irregular Regular rate and rhythm, S1, S2 normal, II/VI systolic murmur LLSB   Abdomen:    Soft, non-tender, bowel sounds are active. Extremities:   LLE 1+, RLE tr-1+ edema   Skin:  Warm and dry. Telemetry: afib, rate controlled.            Data Review:    Labs:    Recent Results (from the past 24 hour(s))   GLUCOSE, POC    Collection Time: 01/16/22  6:15 PM   Result Value Ref Range    Glucose (POC) 145 (H) 65 - 117 mg/dL    Performed by [address] [place] [address], POC    Collection Time: 01/16/22  9:24 PM   Result Value Ref Range    Glucose (POC) 104 65 - 117 mg/dL    Performed by [address] [place] [address], BASIC    Collection Time: 01/17/22  6:17 AM   Result Value Ref Range    Sodium 137 136 - 145 mmol/L    Potassium 4.0 3.5 - 5.1 mmol/L    Chloride 104 97 - 108 mmol/L    CO2 26 21 - 32 mmol/L    Anion gap 7 5 - 15 mmol/L    Glucose 106 (H) 65 - 100 mg/dL    BUN 48 (H) 6 - 20 MG/DL    Creatinine 1.91 (H) 0.55 - 1.02 MG/DL    BUN/Creatinine ratio 25 (H) 12 - 20      GFR est AA 31 (L) >60 ml/min/1.73m2    GFR est non-AA 25 (L) >60 ml/min/1.73m2    Calcium 9.3 8.5 - 10.1 MG/DL   MAGNESIUM    Collection Time: 01/17/22  6:17 AM   Result Value Ref Range    Magnesium 2.1 1.6 - 2.4 mg/dL   CBC WITH AUTOMATED DIFF    Collection Time: 01/17/22  6:17 AM   Result Value Ref Range    WBC 5.2 3.6 - 11.0 K/uL    RBC 2.50 (L) 3.80 - 5.20 M/uL    HGB 8.0 (L) 11.5 - 16.0 g/dL    HCT 25.3 (L) 35.0 - 47.0 %    .2 (H) 80.0 - 99.0 FL    MCH 32.0 26.0 - 34.0 PG    MCHC 31.6 30.0 - 36.5 g/dL    RDW 13.4 11.5 - 14.5 %    PLATELET 325 241 - 690 K/uL    MPV 8.7 (L) 8.9 - 12.9 FL    NRBC 0.0 0  WBC    ABSOLUTE NRBC 0.00 0.00 - 0.01 K/uL    NEUTROPHILS 68 32 - 75 %    LYMPHOCYTES 15 12 - 49 %    MONOCYTES 10 5 - 13 %    EOSINOPHILS 6 0 - 7 %    BASOPHILS 1 0 - 1 %    IMMATURE GRANULOCYTES 0 0.0 - 0.5 %    ABS. NEUTROPHILS 3.5 1.8 - 8.0 K/UL    ABS. LYMPHOCYTES 0.8 0.8 - 3.5 K/UL    ABS. MONOCYTES 0.5 0.0 - 1.0 K/UL    ABS. EOSINOPHILS 0.3 0.0 - 0.4 K/UL    ABS. BASOPHILS 0.1 0.0 - 0.1 K/UL    ABS. IMM.  GRANS. 0.0 0.00 - 0.04 K/UL    DF SMEAR SCANNED      RBC COMMENTS ANISOCYTOSIS  1+        RBC COMMENTS MACROCYTOSIS  1+       NT-PRO BNP    Collection Time: 01/17/22  6:17 AM   Result Value Ref Range    NT pro-BNP 11,361 (H) <450 PG/ML   GLUCOSE, POC    Collection Time: 01/17/22  8:38 AM   Result Value Ref Range    Glucose (POC) 135 (H) 65 - 117 mg/dL    Performed by [place]    GLUCOSE, POC    Collection Time: 01/17/22 11:57 AM   Result Value Ref Range    Glucose (POC) 397 (H) 65 - 117 mg/dL    Performed by Sergio DUTTON           Radiology:        Current Facility-Administered Medications   Medication Dose Route Frequency    hydrALAZINE (APRESOLINE) tablet 37.5 mg  37.5 mg Oral BID    isosorbide dinitrate (ISORDIL) tablet 20 mg  20 mg Oral BID    insulin lispro (HUMALOG) injection   SubCUTAneous AC&HS    glucose chewable tablet 16 g  4 Tablet Oral PRN    dextrose (D50W) injection syrg 12.5-25 g  12.5-25 g IntraVENous PRN    glucagon (GLUCAGEN) injection 1 mg  1 mg IntraMUSCular PRN    ARIPiprazole (ABILIFY) tablet 20 mg  20 mg Oral DAILY    atorvastatin (LIPITOR) tablet 40 mg  40 mg Oral QHS    busPIRone (BUSPAR) tablet 5 mg  5 mg Oral TID    carbidopa-levodopa (SINEMET)  mg per tablet 2 Tablet  2 Tablet Oral QID    cholecalciferol (VITAMIN D3) (1000 Units /25 mcg) tablet 1,000 Units  1,000 Units Oral DAILY  cilostazoL (PLETAL) tablet 100 mg  100 mg Oral ACB&D    clopidogreL (PLAVIX) tablet 75 mg  75 mg Oral DAILY AFTER BREAKFAST    dilTIAZem IR (CARDIZEM) tablet 30 mg  30 mg Oral TIDAC    cyanocobalamin (VITAMIN B12) tablet 100 mcg  100 mcg Oral DAILY    DULoxetine (CYMBALTA) capsule 120 mg  120 mg Oral DAILY    gabapentin (NEURONTIN) capsule 100 mg  100 mg Oral QHS    nitroglycerin (NITROSTAT) tablet 0.4 mg  0.4 mg SubLINGual Q5MIN PRN    pantoprazole (PROTONIX) tablet 40 mg  40 mg Oral ACB    polyethylene glycol (MIRALAX) packet 17 g  17 g Oral DAILY PRN    senna-docusate (PERICOLACE) 8.6-50 mg per tablet 1 Tablet  1 Tablet Oral QHS    bumetanide (BUMEX) injection 1 mg  1 mg IntraVENous BID    albuterol-ipratropium (DUO-NEB) 2.5 MG-0.5 MG/3 ML  3 mL Nebulization Q6H PRN    carvediloL (COREG) tablet 37.5 mg  37.5 mg Oral BID WITH MEALS          Shankar Shen.  SUAD Sue     [facility] [address] [place] [address], [address] [place] [address] [address]   [place], [address]   [phone]

## 2022-01-17 NOTE — PROGRESS NOTES
[address] [facility] Adult  Hospitalist Group                                                                                          Hospitalist Progress Note  Jorge Hoang MD  Answering service: [phone] OR [phone] from in house phone        Date of Service:  2022  NAME:  Ramona Benavides  :  1940  MRN:  [id number]      Admission Summary:   [de-identified] yo female who was discharged from hospital on 1/15 after being admitted for CHF, Anemia/AFib/MORENO. Patient was on diuretics but then held on  due to hypotension. Pt had anemia and given 1 U PRBCs on . Pt's Eliquis stopped due to concern for bleeding. Patient's diuretics were not restarted and discharged to [facility]. Patient sent back to ER due to SOB. Interval history / Subjective:   F/u A fib with RVR  RRT overnight for hypoxia and afib with RVR', Bumex 2mg IV given during RRT  Was on 10L, decreased to 7L this AM   No chest pain   Sob better today   HR in 140s this AM , placed on Cardizem gtt    On 1l NC  HR at 88     Assessment & Plan:     Atrial Fib with RVR  - on Coreg/Cardizem  -Appreciate Cardiology  - not on [address] due to concerns for bleeding during previous admission     Acute respiratory failure with hypoxia due to Pulmonary edema  Acute on chronic systolic CHF NYHA III  - c/w IV bumex  - c/w coreg  -daily weight  -I/O  - no ACEI/ARBs due to CKD  -Appreciate Nephrology/Cardiology    CKD 4-c/w diuretics-Appreciate nephrology  Parkinson's-c/w sinemet   CAD s/p CABG/stent-c/w ASA/Plavix/Statin/BB  HTN-stable BP    Multiple hospitalizations. 5 admissions since 2021    PT/OT SNF      Code status: DNR   DVT prophylaxis: SCDs    Plan: Continue IV diuresis.  Likely discharge tomorrow    Care Plan discussed with: Patient/Family, Nurse and   Anticipated Disposition: SNF/LTC  Anticipated Discharge: Greater than 48 hours     Hospital Problems  Date Reviewed: 2022          Codes Class Noted POA    * (Principal) Pulmonary edema ICD-10-CM: J81.1  ICD-9-CM: 816  1/16/2022 Yes        Atrial fibrillation with RVR ([facility]) ICD-10-CM: I48.91  ICD-9-CM: 427.31  1/16/2022 Unknown        Acute respiratory failure with hypoxia [facility]) ICD-10-CM: J96.01  ICD-9-CM: 518.81  1/16/2022 Unknown        Acute on chronic systolic and diastolic heart failure, NYHA class 4 (HCC) ICD-10-CM: I50.43  ICD-9-CM: 428.43  1/16/2022 Unknown        CHF exacerbation (HCC) ICD-10-CM: I50.9  ICD-9-CM: 428.0  1/15/2022 Unknown                Review of Systems:   A comprehensive review of systems was negative except for that written in the HPI. Vital Signs:    Last 24hrs VS reviewed since prior progress note. Most recent are:  Visit Vitals  BP (!) 90/52 (BP 1 Location: Right upper arm, BP Patient Position: At rest)   Pulse 88   Temp 97.9 °F (36.6 °C)   Resp 14   Ht 5' 5\" (1.651 m)   Wt 75 kg (165 lb 5.5 oz)   SpO2 98%   BMI 27.51 kg/m²         Intake/Output Summary (Last 24 hours) at 1/17/2022 1359  Last data filed at 1/17/2022 4871  Gross per 24 hour   Intake 319.17 ml   Output 700 ml   Net -380.83 ml        Physical Examination:     I had a face to face encounter with this patient and independently examined them on 1/17/2022 as outlined below:          Constitutional:  No acute distress, cooperative, pleasant    ENT:  Oral mucosa moist, oropharynx benign. Resp:  decreased BS with rales at bases   CV:  irregularly irregular     GI:  Soft, non distended, non tender. normoactive bowel sounds,     Musculoskeletal:  No edema, warm, 2+ pulses throughout    Neurologic:  Moves all extremities.   AAOx3, CN II-XII reviewed            Data Review:    Review and/or order of clinical lab test  Review and/or order of tests in the radiology section of CPT  Review and/or order of tests in the medicine section of CPT      Labs:     Recent Labs     01/17/22  0617 01/16/22  0745   WBC 5.2 7.0   HGB 8.0* 8.8*   HCT 25.3* 27.5*    234     Recent Labs     01/17/22  0664 01/16/22  0745 01/15/22  1013    137 137   K 4.0 4.0 4.5    104 106   CO2 26 27 28   BUN 48* 44* 44*   CREA 1.91* 1.93* 2.09*   * 130* 168*   CA 9.3 8.9 9.0   MG 2.1  --   --      Recent Labs     01/15/22  1013   ALT 8*   AP 84   TBILI 0.5   TP 6.2*   ALB 3.2*   GLOB 3.0     No results for input(s): INR, PTP, APTT, INREXT, INREXT in the last 72 hours. No results for input(s): FE, TIBC, PSAT, FERR in the last 72 hours. Lab Results   Component Value Date/Time    Folate 7.8 12/14/2021 04:40 AM      No results for input(s): PH, PCO2, PO2 in the last 72 hours. No results for input(s): CPK, CKNDX, TROIQ in the last 72 hours.     No lab exists for component: CPKMB  Lab Results   Component Value Date/Time    Cholesterol, total 148 09/23/2020 04:27 AM    HDL Cholesterol 52 09/23/2020 04:27 AM    LDL, calculated 83.8 09/23/2020 04:27 AM    Triglyceride 61 09/23/2020 04:27 AM    CHOL/HDL Ratio 2.8 09/23/2020 04:27 AM     Lab Results   Component Value Date/Time    Glucose (POC) 397 (H) 01/17/2022 11:57 AM    Glucose (POC) 135 (H) 01/17/2022 08:38 AM    Glucose (POC) 104 01/16/2022 09:24 PM    Glucose (POC) 145 (H) 01/16/2022 06:15 PM    Glucose (POC) 172 (H) 01/16/2022 12:11 PM     Lab Results   Component Value Date/Time    Color YELLOW/STRAW 12/30/2021 08:23 PM    Appearance CLOUDY (A) 12/30/2021 08:23 PM    Specific gravity 1.017 12/30/2021 08:23 PM    pH (UA) 6.0 12/30/2021 08:23 PM    Protein 100 (A) 12/30/2021 08:23 PM    Glucose Negative 12/30/2021 08:23 PM    Ketone Negative 12/30/2021 08:23 PM    Bilirubin Negative 12/30/2021 08:23 PM    Urobilinogen 1.0 12/30/2021 08:23 PM    Nitrites Negative 12/30/2021 08:23 PM    Leukocyte Esterase MODERATE (A) 12/30/2021 08:23 PM    Epithelial cells FEW 12/30/2021 08:23 PM    Bacteria 1+ (A) 12/30/2021 08:23 PM    WBC 0-4 12/30/2021 08:23 PM    RBC 0-5 12/30/2021 08:23 PM         Medications Reviewed:     Current Facility-Administered Medications   Medication Dose Route Frequency    hydrALAZINE (APRESOLINE) tablet 25 mg  25 mg Oral BID    isosorbide dinitrate (ISORDIL) tablet 10 mg  10 mg Oral BID    insulin lispro (HUMALOG) injection   SubCUTAneous AC&HS    glucose chewable tablet 16 g  4 Tablet Oral PRN    dextrose (D50W) injection syrg 12.5-25 g  12.5-25 g IntraVENous PRN    glucagon (GLUCAGEN) injection 1 mg  1 mg IntraMUSCular PRN    ARIPiprazole (ABILIFY) tablet 20 mg  20 mg Oral DAILY    atorvastatin (LIPITOR) tablet 40 mg  40 mg Oral QHS    busPIRone (BUSPAR) tablet 5 mg  5 mg Oral TID    carbidopa-levodopa (SINEMET)  mg per tablet 2 Tablet  2 Tablet Oral QID    cholecalciferol (VITAMIN D3) (1000 Units /25 mcg) tablet 1,000 Units  1,000 Units Oral DAILY    cilostazoL (PLETAL) tablet 100 mg  100 mg Oral ACB&D    clopidogreL (PLAVIX) tablet 75 mg  75 mg Oral DAILY AFTER BREAKFAST    dilTIAZem IR (CARDIZEM) tablet 30 mg  30 mg Oral TIDAC    cyanocobalamin (VITAMIN B12) tablet 100 mcg  100 mcg Oral DAILY    DULoxetine (CYMBALTA) capsule 120 mg  120 mg Oral DAILY    gabapentin (NEURONTIN) capsule 100 mg  100 mg Oral QHS    nitroglycerin (NITROSTAT) tablet 0.4 mg  0.4 mg SubLINGual Q5MIN PRN    pantoprazole (PROTONIX) tablet 40 mg  40 mg Oral ACB    polyethylene glycol (MIRALAX) packet 17 g  17 g Oral DAILY PRN    senna-docusate (PERICOLACE) 8.6-50 mg per tablet 1 Tablet  1 Tablet Oral QHS    bumetanide (BUMEX) injection 1 mg  1 mg IntraVENous BID    albuterol-ipratropium (DUO-NEB) 2.5 MG-0.5 MG/3 ML  3 mL Nebulization Q6H PRN    carvediloL (COREG) tablet 37.5 mg  37.5 mg Oral BID WITH MEALS     ______________________________________________________________________  EXPECTED LENGTH OF STAY: - - -  ACTUAL LENGTH OF STAY:          1                 Miguel Hammonds MD

## 2022-01-17 NOTE — PROGRESS NOTES
Problem: Mobility Impaired (Adult and Pediatric)  Goal: *Acute Goals and Plan of Care (Insert Text)  Description: FUNCTIONAL STATUS PRIOR TO ADMISSION: The patient was functional at the wheelchair level and reports requiring assist for transfers using a walker. HOME SUPPORT PRIOR TO ADMISSION: The patient lived in a SNF and received assist of staff for mobility prn. Physical Therapy Goals  Initiated 1/17/2022  1. Patient will move from supine to sit and sit to supine , scoot up and down, and roll side to side in bed with minimal assistance/contact guard assist within 7 day(s). 2.  Patient will transfer from bed to chair and chair to bed with moderate assistance  using the least restrictive device within 7 day(s). 3.  Patient will perform sit to stand with moderate assistance  within 7 day(s). 4.  Patient will ambulate with moderate assistance  for 5 feet with the least restrictive device within 7 day(s). Outcome: Progressing Towards Goal   PHYSICAL THERAPY EVALUATION  Patient: Nate Mancia (89 y.o. female)  Date: 1/17/2022  Primary Diagnosis: CHF exacerbation ([facility] [I50.9]  Acute respiratory failure with hypoxia ([facility]) [J96.01]  Pulmonary edema [J81.1]  Atrial fibrillation with RVR ([facility]) [I48.91]  Acute on chronic systolic and diastolic heart failure, NYHA class 4 ([facility] [I50.43]        Precautions:   Fall,Skin,Contact    ASSESSMENT  Based on the objective data described below, the patient presents with low resting BP (and reporting being a symptomatic) that improved with activity, see chart below. She is limited by: rigidity and slow to initiate movement (has Parkinson's disease), impaired sitting and standing balance, and was only able to take a few sidesteps along the EOB (requiring assist with weight shift to the left to move her RLE). She was admitted from a SNF (readmitted) after one day. Had been admitted that time for CHF, anemia, a fib, and MORENO.  Pt currently is in a fib but HR was stable, briefly > 100 bpm during eval and that was when she was standing. Anticipate slow gains and return to SNF. Cora Jarrett Vitals:       01/17/22 0940 01/17/22 0947 01/17/22 0951   BP:   (!) 78/50 (!) 97/44 (!) 104/59   BP 1 Location:   Right upper arm Right upper arm Right upper arm   BP Patient Position:   Semi fowlers Sitting  Comment: EOB Sitting  Comment: EOB   Pulse:    83  96  88   Temp:           Resp:   20       Height:           Weight:           SpO2:on 4 liters of 02   97% 96%                  Current Level of Function Impacting Discharge (mobility/balance): mod assist X 2 with impaired sitting and standing balance. Functional Outcome Measure: The patient scored 1/28 on the Tinetti outcome measure which is indicative of high fall risk. .      Other factors to consider for discharge: a fib, HTN, CKD 4, Parkinson's Disease, DM previous CVA, CHF       Patient will benefit from skilled therapy intervention to address the above noted impairments. PLAN :  Recommendations and Planned Interventions: bed mobility training, transfer training, gait training, therapeutic exercises, edema management/control, patient and family training/education, and therapeutic activities      Frequency/Duration: Patient will be followed by physical therapy:  3 times a week to address goals. Recommendation for discharge: (in order for the patient to meet his/her long term goals)  Therapy up to 5 days/week in SNF setting    This discharge recommendation:  A follow-up discussion with the attending provider and/or case management is planned    IF patient discharges home will need the following DME: patient owns DME required for discharge         SUBJECTIVE:   Patient stated Lena Furlough you move me up in the bed. 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 Toan Caceres, old 2007    PUD (peptic ulcer disease)     Stroke ([facility]     Tremor     Valvular heart disease      Past Surgical History:   Procedure Laterality Date    COLONOSCOPY N/A 12/17/2021    COLONOSCOPY   :- performed by Sola Posadas MD at [address] HX CAROTID ENDARTERECTOMY  07/2019    HX CORONARY ARTERY BYPASS GRAFT      HX TONSILLECTOMY  1963    [place] CARDIAC SURG PROCEDURE UNLIST      CABG X3 VESSEL    [place] TOTAL KNEE ARTHROPLASTY Right 2015       Personal factors and/or comorbidities impacting plan of care: a fib, HTN, CKD 4, Parkinson's Disease, DM previous CVA, CHF    Home Situation  Home Environment: [address] [place] [address] Name: NARENDRA ([facility])  Current DME Used/Available at Home: Wheelchair    EXAMINATION/PRESENTATION/DECISION MAKING:   Critical Behavior:  Neurologic State: Alert  Orientation Level: Oriented X4  Cognition: Follows commands     Hearing:   Auditory  Auditory Impairment: None  Skin:  refer to MD and nursing notes  Edema: yes LEs, removed tight socks  Range Of Motion:  AROM: Generally decreased, functional                       Strength:    Strength: Generally decreased, functional                    Tone & Sensation:                  Sensation: Impaired (at baseline in feet)               Coordination:     Vision:   Acuity: Within Defined Limits;Able to read clock/calendar on wall without difficulty  Corrective Lenses: Reading glasses  Functional Mobility:  Bed Mobility:  Rolling: Moderate assistance;Assist x2  Supine to Sit: Moderate assistance;Assist x2  Sit to Supine: Moderate assistance;Assist x2  Scooting: Moderate assistance; Additional time  Transfers:  Sit to Stand: Moderate assistance;Assist x2  Stand to Sit: Moderate assistance;Assist x2                       Balance:   Sitting: Impaired; Without support  Sitting - Static: Fair (occasional)  Sitting - Dynamic: Poor (constant support)  Standing: Impaired; With support  Standing - Static: Constant support;Poor  Standing - Dynamic : Constant support;Poor  Ambulation/Gait Training:  Distance (ft): 1 Feet (ft)  Assistive Device: Gait belt (bilateral hand held assist)  Ambulation - Level of Assistance: Moderate assistance;Assist x2        Gait Abnormalities: Decreased step clearance (required assist with weight shift)        Base of Support: Center of gravity altered;Narrowed     Speed/Samira: Slow  Step Length: Left shortened;Right shortened                     Stairs: Therapeutic Exercises: Ankle pumps    Functional Measure:  Tinetti test:    Sitting Balance: 1 (extrapolated)  Arises: 0  Attempts to Rise: 0  Immediate Standing Balance: 0  Standing Balance: 0  Nudged: 0  Eyes Closed: 0  Turn 360 Degrees - Continuous/Discontinuous: 0  Turn 360 Degrees - Steady/Unsteady: 0  Sitting Down: 0  Balance Score: 1 Balance total score  Indication of Gait: 0  R Step Length/Height: 0  L Step Length/Height: 0  R Foot Clearance: 0  L Foot Clearance: 0  Step Symmetry: 0  Step Continuity: 0  Path: 0  Trunk: 0  Walking Time: 0  Gait Score: 0 Gait total score  Total Score: 1/28 Overall total score         Tinetti Tool Score Risk of Falls  <19 = High Fall Risk  19-24 = Moderate Fall Risk  25-28 = Low Fall Risk  Belloetti ME. Performance-Oriented Assessment of Mobility Problems in Elderly Patients. Ambriz 66; [id number].  (Scoring Description: PT Bulletin Feb. 10, 1993)    Older adults: Sherryle Hobby et al, 2009; n = 1000 [facility] elderly evaluated with ABC, KATJA, ADL, and IADL)  · Mean KATJA score for males aged 69-68 years = 26.21(3.40)  · Mean KATJA score for females age 69-68 years = 25.16(4.30)  · Mean KATJA score for males over 80 years = 23.29(6.02)  · Mean KATJA score for females over 80 years = 17.20(8.32)           Physical Therapy Evaluation Charge Determination   History Examination Presentation Decision-Making   HIGH Complexity :3+ comorbidities / personal factors will impact the outcome/ POC  MEDIUM Complexity : 3 Standardized tests and measures addressing body structure, function, activity limitation and / or participation in recreation  LOW Complexity : Stable, uncomplicated   see assessment LOW Complexity : FOTO score of       Based on the above components, the patient evaluation is determined to be of the following complexity level: LOW     Pain Rating:  None rated but reported back discomfort when sitting unsupported at E0B    Activity Tolerance:   Fair and see assessment    After treatment patient left in no apparent distress:   Supine in bed, Heels elevated for pressure relief, Call bell within reach, and Side rails x 3    COMMUNICATION/EDUCATION:   The patients plan of care was discussed with: Occupational therapist and Registered nurse. Fall prevention education was provided and the patient/caregiver indicated understanding., Patient/family have participated as able in goal setting and plan of care. , and Patient/family agree to work toward stated goals and plan of care.     Thank you for this referral.  Kwabena Garcia   Time Calculation: 38 mins

## 2022-01-17 NOTE — CARDIO/PULMONARY
Cardiac Rehab: Consult received and chart reviewed. Echo on 1/9/2022 reports EF 40-45% therefore patient does not meet the following criteria and is not a candidate for cardiac rehab. NST on 1/11/2022 with EF 37%   EF ? 35% at time of enrollment   Stable class 2-4 NYHA heart failure   Optimal medical therapy for > 6 weeks   No major hospitalizations within the last 6 weeks   No major hospitalizations within the next 6 weeks  Jeison Gilmore RN

## 2022-01-17 NOTE — PROGRESS NOTES
Orders received, chart reviewed and patient evaluated by physical therapy. Pending progression with skilled acute physical therapy, recommend:  Therapy up to 5 days/week in SNF setting    Recommend with nursing Bed to chair 3x/day. Thank you for completing as able in order to maintain patient strength, endurance and independence. Full evaluation to follow.  Rena Lay, PT    Vitals:     01/17/22 0940 01/17/22 0947 01/17/22 0951   BP:  (!) 78/50 (!) 97/44 (!) 104/59   BP 1 Location:  Right upper arm Right upper arm Right upper arm   BP Patient Position:  Semi fowlers Sitting  Comment: EOB Sitting  Comment: EOB   Pulse:       Temp:       Resp:  20     Height:       Weight:       SpO2:on 4 liters of 02  97% 96%

## 2022-01-17 NOTE — PROGRESS NOTES
Nephrology Progress Note  Jeevan Samayoa  Date of Admission : 1/15/2022    CC:  Follow up for MORENO on CKD       Assessment and Plan     MORENO on CKD:  - diff: progressive disease vs CRS  - last  renal U/S neg for hydro, + for bladder distention  - Cr stable  - continue IV Bumex 1 mg BID   - daily labs and strict I/Os     Afib with RVR  - per cards  - on carvedilol and Diltiazem      CKD IV:  - baseline increasing, likely around 1.6 to 1.8 now     HTN:  - BP stable     Acute on Chronic HFpEF  CAD, hx of CABG  - ECHO w/ EF 55-60%  - rapid COVID neg  - diuretics as above         Parkinsons  Hx of CVA  PAD w/ L CEA  AAA       Interval History:  Seen and examined. Feeling ok. BP slightly low. UOP 1.5 L over the past 24 hrs. HR remains in the low 100s. Denies cp or sob. Current Medications: all current  Medications have been eviewed in EPIC  Review of Systems: Pertinent items are noted in HPI. Objective:  Vitals:    Vitals:    01/17/22 0833 01/17/22 0940 01/17/22 0947 01/17/22 0951   BP: 127/75 (!) 78/50 (!) 97/44 (!) 104/59   Pulse:  83 96 88   Resp:  20     Temp: 98 °F (36.7 °C)      SpO2: 98% 97% 96%    Weight:       Height:         Intake and Output:  No intake/output data recorded. 01/15 1901 - 01/17 0700  In: 536.7 [P.O.:500; I.V.:36.7]  Out: 1575 [Urine:1575]    Physical Examination:  General: NAD,Conversant   Neck:  Supple, no mass  Resp:  Lungs CTA B/L, no wheezing , normal respiratory effort  CV:  RRR,  no murmur or rub, trace LE edema  GI:  Soft, NT, + Bowel sounds, no hepatosplenomegaly  Neurologic:  Non focal  Psych:             AAO x 3 appropriate affect   Skin:  No Rash  :  No pittman    []    High complexity decision making was performed  []    Patient is at high-risk of decompensation with multiple organ involvement    Lab Data Personally Reviewed: I have reviewed all the pertinent labs, microbiology data and radiology studies during assessment.     Recent Labs     01/17/22  0617 01/16/22  0745 01/15/22  1013    137 137   K 4.0 4.0 4.5    104 106   CO2 26 27 28   * 130* 168*   BUN 48* 44* 44*   CREA 1.91* 1.93* 2.09*   CA 9.3 8.9 9.0   MG 2.1  --   --    ALB  --   --  3.2*   ALT  --   --  8*     Recent Labs     01/17/22  0617 01/16/22  0745 01/15/22  1013   WBC 5.2 7.0 11.2*   HGB 8.0* 8.8* 8.7*   HCT 25.3* 27.5* 27.9*    234 244     No results found for: SDES  Lab Results   Component Value Date/Time    Culture result: NO GROWTH 5 DAYS 01/07/2022 08:37 PM    Culture result: MRSA NOT PRESENT 12/10/2021 06:53 AM    Culture result:  12/10/2021 06:53 AM     Screening of patient nares for MRSA is for surveillance purposes and, if positive, to facilitate isolation considerations in high risk settings. It is not intended for automatic decolonization interventions per se as regimens are not sufficiently effective to warrant routine use.      Recent Results (from the past 24 hour(s))   GLUCOSE, POC    Collection Time: 01/16/22 12:11 PM   Result Value Ref Range    Glucose (POC) 172 (H) 65 - 117 mg/dL    Performed by Sergio DUTTON    GLUCOSE, POC    Collection Time: 01/16/22  6:15 PM   Result Value Ref Range    Glucose (POC) 145 (H) 65 - 117 mg/dL    Performed by [address] [place] [address], POC    Collection Time: 01/16/22  9:24 PM   Result Value Ref Range    Glucose (POC) 104 65 - 117 mg/dL    Performed by [address] [place] [address], BASIC    Collection Time: 01/17/22  6:17 AM   Result Value Ref Range    Sodium 137 136 - 145 mmol/L    Potassium 4.0 3.5 - 5.1 mmol/L    Chloride 104 97 - 108 mmol/L    CO2 26 21 - 32 mmol/L    Anion gap 7 5 - 15 mmol/L    Glucose 106 (H) 65 - 100 mg/dL    BUN 48 (H) 6 - 20 MG/DL    Creatinine 1.91 (H) 0.55 - 1.02 MG/DL    BUN/Creatinine ratio 25 (H) 12 - 20      GFR est AA 31 (L) >60 ml/min/1.73m2    GFR est non-AA 25 (L) >60 ml/min/1.73m2    Calcium 9.3 8.5 - 10.1 MG/DL   MAGNESIUM    Collection Time: 01/17/22  6:17 AM Result Value Ref Range    Magnesium 2.1 1.6 - 2.4 mg/dL   CBC WITH AUTOMATED DIFF    Collection Time: 01/17/22  6:17 AM   Result Value Ref Range    WBC 5.2 3.6 - 11.0 K/uL    RBC 2.50 (L) 3.80 - 5.20 M/uL    HGB 8.0 (L) 11.5 - 16.0 g/dL    HCT 25.3 (L) 35.0 - 47.0 %    .2 (H) 80.0 - 99.0 FL    MCH 32.0 26.0 - 34.0 PG    MCHC 31.6 30.0 - 36.5 g/dL    RDW 13.4 11.5 - 14.5 %    PLATELET 366 956 - 258 K/uL    MPV 8.7 (L) 8.9 - 12.9 FL    NRBC 0.0 0  WBC    ABSOLUTE NRBC 0.00 0.00 - 0.01 K/uL    NEUTROPHILS 68 32 - 75 %    LYMPHOCYTES 15 12 - 49 %    MONOCYTES 10 5 - 13 %    EOSINOPHILS 6 0 - 7 %    BASOPHILS 1 0 - 1 %    IMMATURE GRANULOCYTES 0 0.0 - 0.5 %    ABS. NEUTROPHILS 3.5 1.8 - 8.0 K/UL    ABS. LYMPHOCYTES 0.8 0.8 - 3.5 K/UL    ABS. MONOCYTES 0.5 0.0 - 1.0 K/UL    ABS. EOSINOPHILS 0.3 0.0 - 0.4 K/UL    ABS. BASOPHILS 0.1 0.0 - 0.1 K/UL    ABS. IMM. GRANS. 0.0 0.00 - 0.04 K/UL    DF SMEAR SCANNED      RBC COMMENTS ANISOCYTOSIS  1+        RBC COMMENTS MACROCYTOSIS  1+       NT-PRO BNP    Collection Time: 01/17/22  6:17 AM   Result Value Ref Range    NT pro-BNP 11,361 (H) <450 PG/ML   GLUCOSE, POC    Collection Time: 01/17/22  8:38 AM   Result Value Ref Range    Glucose (POC) 135 (H) 65 - 117 mg/dL    Performed by Neeraj Baum MD  [address]  Phone - [phone]   Fax - [phone]  [email or web address]

## 2022-01-17 NOTE — NURSE NAVIGATOR
Chart reviewed by Heart Failure Nurse Navigator. Heart Failure database completed. EF:  40/45%    ACEi/ARB/ARNi: contraindicated d/t CKD    BB: coreg 25 mg twice daily    Aldosterone Antagonist: not currently indicated    Obstructive Sleep Apnea Screening: screening priority 4, advanced age   STOP-BANG score:   Referred to Sleep Medicine:     CRT not currently indicated. NYHA Functional Class IV on admission. Heart Failure Teach Back in Patient Education. Heart Failure Avoiding Triggers on Discharge Instructions. Cardiologist: Dr. Vickie Barron ([facility])      Post discharge follow up phone call to be made within 48-72 hours of discharge. 27 Day HF Readmission:    Prior admission 1/7-1/14/22  Final coding: Hypertensive heart and chronic kidney disease with heart failure and stage 1 through stage 4 chronic kidney disease, or unspecified chronic kidney disease    Discharging MD: Dr. Janessa Bynum   Discharging Unit: [facility] (no SMAART-E DC done as patient discharged to SNF)    Patient was discharged to [facility] SNF on 1/14/22. Returned to ED on 1/15/22 for complaint of shortness of breath. Of note, diuretics were held since 1/11/22 for hypotension and patient was given transfusion prior to discharge. On prior admission pro NT BNP was 20,345; 1/10/22 21,802. No pro NT BNP performed again prior to discharge. ON arrival to ED 1/15/22 NT pro BNP 11,624. Patient also has atrial fibrillation. Cardiology is following. This is 3rd HF admission since 12/30/21.

## 2022-01-17 NOTE — PROGRESS NOTES
2000:  Verbal bedside report given to Marita Bassett RN oncoming nurse by Lino Peguero RN off-going nurse. Report included current pt status and condition, recent results, hx of present illness, heart rate and rhythm, and respiratory status.

## 2022-01-17 NOTE — PROGRESS NOTES
Transition of Care Plan   RUR: 32%   Disposition: The disposition plan is to return to SNF [facility] F/U with PCP/Specialist     Transport: BLS       Reason for Readmission:   CHF exacerbation            RUR Score/Risk Level:    32%    PCP: First and Last name:  Christina Block, DO   Name of Practice:    Are you a current patient: Yes/No:    Approximate date of last visit:    Can you participate in a virtual visit with your PCP:     Is a Care Conference indicated:  No      Did you attend your follow up appointment (s): If not, why not: Patient returned to the hospital the very next day          Resources/supports as identified by patient/family:   Family & friends        Top Challenges facing patient (as identified by patient/family and CM):    Heart condition     Finances/Medication cost?    No issues    Transportation    Family    Support system or lack thereof? Family     Living arrangements? Lives  alone      Self-care/ADLs/Cognition? Previously indpt           Current Advanced Directive/Advance Care Plan:             Plan for utilizing home health:   Needing SNF              Transition of Care Plan:    Based on readmission, the patient's previous Plan of Care   has been evaluated and/or modified. The current Transition of Care Plan is:             Patient has had several re-admissions. Patient was at [facility] but as of last discharge, [facility] was not accepting any patients at that time. Patient discharged to [address] [place] [address]; referral sent to them for patient to return upon discharge.      Readmission Assessment  Number of days since last admission?: 1-7 days  Previous disposition: SNF  Who is being interviewed?: Caregiver  What was the patient's/caregiver's perception as to why they think they needed to return back to the hospital?: Other (Comment)  Did you visit your Primary Care Physician after you left the hospital, before you returned this time?: No  Why weren't you able to visit your PCP?: Other (Comment)  Did you see a specialist, such as Cardiac, Pulmonary, Orthopedic Physician, etc. after you left the hospital?: No  Who advised the patient to return to the hospital?: Skilled Unit  In our efforts to provide the best possible care to you and others like you, can you think of anything that we could have done to help you after you left the hospital the first time, so that you might not have needed to return so soon?: Other (Comment)    Care Management Interventions  PCP Verified by CM:  Yes  Palliative Care Criteria Met (RRAT>21 & CHF Dx)?: No  Mode of Transport at Discharge: [facility]  Transition of Care Consult (CM Consult): Discharge Planning,SNF  Partner SNF: Yes  MyChart Signup: No  Discharge Durable Medical Equipment: No  Health Maintenance Reviewed: Yes  Physical Therapy Consult: Yes  Occupational Therapy Consult: Yes  Speech Therapy Consult: No  Support Systems: Other Family Member(s)  Confirm Follow Up Transport: Family  The Patient and/or Patient Representative was Provided with a Choice of Provider and Agrees with the Discharge Plan?: Yes  Freedom of Choice List was Provided with Basic Dialogue that Supports the Patient's Individualized Plan of Care/Goals, Treatment Preferences and Shares the Quality Data Associated with the Providers?: Yes  The [employer] Information Provided?: No  Discharge Location  Discharge Placement: Skilled nursing facility    [address]

## 2022-01-18 LAB
ANION GAP SERPL CALC-SCNC: 7 MMOL/L (ref 5–15)
ARTERIAL PATENCY WRIST A: POSITIVE
BASE EXCESS BLD CALC-SCNC: 1.6 MMOL/L
BASOPHILS # BLD: 0 K/UL (ref 0–0.1)
BASOPHILS NFR BLD: 0 % (ref 0–1)
BDY SITE: ABNORMAL
BUN SERPL-MCNC: 53 MG/DL (ref 6–20)
BUN/CREAT SERPL: 27 (ref 12–20)
CALCIUM SERPL-MCNC: 8.9 MG/DL (ref 8.5–10.1)
CHLORIDE SERPL-SCNC: 103 MMOL/L (ref 97–108)
CO2 SERPL-SCNC: 26 MMOL/L (ref 21–32)
CREAT SERPL-MCNC: 1.97 MG/DL (ref 0.55–1.02)
DIFFERENTIAL METHOD BLD: ABNORMAL
EOSINOPHIL # BLD: 0.3 K/UL (ref 0–0.4)
EOSINOPHIL NFR BLD: 6 % (ref 0–7)
ERYTHROCYTE [DISTWIDTH] IN BLOOD BY AUTOMATED COUNT: 13.1 % (ref 11.5–14.5)
GAS FLOW.O2 O2 DELIVERY SYS: ABNORMAL L/MIN
GLUCOSE BLD STRIP.AUTO-MCNC: 126 MG/DL (ref 65–117)
GLUCOSE BLD STRIP.AUTO-MCNC: 136 MG/DL (ref 65–117)
GLUCOSE BLD STRIP.AUTO-MCNC: 160 MG/DL (ref 65–117)
GLUCOSE BLD STRIP.AUTO-MCNC: 169 MG/DL (ref 65–117)
GLUCOSE SERPL-MCNC: 135 MG/DL (ref 65–100)
HCO3 BLD-SCNC: 26.3 MMOL/L (ref 22–26)
HCT VFR BLD AUTO: 23.3 % (ref 35–47)
HEMOCCULT STL QL: NEGATIVE
HGB BLD-MCNC: 7.6 G/DL (ref 11.5–16)
IMM GRANULOCYTES # BLD AUTO: 0 K/UL (ref 0–0.04)
IMM GRANULOCYTES NFR BLD AUTO: 0 % (ref 0–0.5)
LYMPHOCYTES # BLD: 0.7 K/UL (ref 0.8–3.5)
LYMPHOCYTES NFR BLD: 16 % (ref 12–49)
MCH RBC QN AUTO: 32.3 PG (ref 26–34)
MCHC RBC AUTO-ENTMCNC: 32.6 G/DL (ref 30–36.5)
MCV RBC AUTO: 99.1 FL (ref 80–99)
MONOCYTES # BLD: 0.5 K/UL (ref 0–1)
MONOCYTES NFR BLD: 11 % (ref 5–13)
NEUTS SEG # BLD: 3.1 K/UL (ref 1.8–8)
NEUTS SEG NFR BLD: 67 % (ref 32–75)
NRBC # BLD: 0 K/UL (ref 0–0.01)
NRBC BLD-RTO: 0 PER 100 WBC
O2/TOTAL GAS SETTING VFR VENT: 3 %
PCO2 BLD: 40.6 MMHG (ref 35–45)
PH BLD: 7.42 [PH] (ref 7.35–7.45)
PLATELET # BLD AUTO: 204 K/UL (ref 150–400)
PMV BLD AUTO: 8.7 FL (ref 8.9–12.9)
PO2 BLD: 62 MMHG (ref 80–100)
POTASSIUM SERPL-SCNC: 4.1 MMOL/L (ref 3.5–5.1)
RBC # BLD AUTO: 2.35 M/UL (ref 3.8–5.2)
RBC MORPH BLD: ABNORMAL
RBC MORPH BLD: ABNORMAL
SAO2 % BLD: 91.8 % (ref 92–97)
SERVICE CMNT-IMP: ABNORMAL
SODIUM SERPL-SCNC: 136 MMOL/L (ref 136–145)
SPECIMEN TYPE: ABNORMAL
WBC # BLD AUTO: 4.6 K/UL (ref 3.6–11)

## 2022-01-18 PROCEDURE — 36415 COLL VENOUS BLD VENIPUNCTURE: CPT

## 2022-01-18 PROCEDURE — 74011000250 HC RX REV CODE- 250: Performed by: HOSPITALIST

## 2022-01-18 PROCEDURE — 74011250637 HC RX REV CODE- 250/637: Performed by: NURSE PRACTITIONER

## 2022-01-18 PROCEDURE — 82962 GLUCOSE BLOOD TEST: CPT

## 2022-01-18 PROCEDURE — 80048 BASIC METABOLIC PNL TOTAL CA: CPT

## 2022-01-18 PROCEDURE — 74011250637 HC RX REV CODE- 250/637: Performed by: HOSPITALIST

## 2022-01-18 PROCEDURE — 74011250637 HC RX REV CODE- 250/637: Performed by: INTERNAL MEDICINE

## 2022-01-18 PROCEDURE — 74011636637 HC RX REV CODE- 636/637: Performed by: HOSPITALIST

## 2022-01-18 PROCEDURE — 77010033678 HC OXYGEN DAILY

## 2022-01-18 PROCEDURE — 94760 N-INVAS EAR/PLS OXIMETRY 1: CPT

## 2022-01-18 PROCEDURE — P9047 ALBUMIN (HUMAN), 25%, 50ML: HCPCS | Performed by: HOSPITALIST

## 2022-01-18 PROCEDURE — 97535 SELF CARE MNGMENT TRAINING: CPT

## 2022-01-18 PROCEDURE — 36600 WITHDRAWAL OF ARTERIAL BLOOD: CPT

## 2022-01-18 PROCEDURE — 82803 BLOOD GASES ANY COMBINATION: CPT

## 2022-01-18 PROCEDURE — 85025 COMPLETE CBC W/AUTO DIFF WBC: CPT

## 2022-01-18 PROCEDURE — 74011250636 HC RX REV CODE- 250/636: Performed by: HOSPITALIST

## 2022-01-18 PROCEDURE — 65660000001 HC RM ICU INTERMED STEPDOWN

## 2022-01-18 RX ORDER — ALBUMIN HUMAN 250 G/1000ML
12.5 SOLUTION INTRAVENOUS EVERY 6 HOURS
Status: DISCONTINUED | OUTPATIENT
Start: 2022-01-18 | End: 2022-01-19

## 2022-01-18 RX ORDER — DILTIAZEM HYDROCHLORIDE 30 MG/1
60 TABLET, FILM COATED ORAL
Status: DISCONTINUED | OUTPATIENT
Start: 2022-01-18 | End: 2022-01-19

## 2022-01-18 RX ADMIN — CLOPIDOGREL 75 MG: 75 TABLET, FILM COATED ORAL at 09:40

## 2022-01-18 RX ADMIN — Medication 2 UNITS: at 12:50

## 2022-01-18 RX ADMIN — Medication 2 UNITS: at 17:48

## 2022-01-18 RX ADMIN — CARVEDILOL 37.5 MG: 12.5 TABLET, FILM COATED ORAL at 17:02

## 2022-01-18 RX ADMIN — ALBUMIN (HUMAN) 12.5 G: 0.25 INJECTION, SOLUTION INTRAVENOUS at 19:30

## 2022-01-18 RX ADMIN — CILOSTAZOL 100 MG: 50 TABLET ORAL at 17:02

## 2022-01-18 RX ADMIN — BUSPIRONE HYDROCHLORIDE 5 MG: 5 TABLET ORAL at 17:02

## 2022-01-18 RX ADMIN — CARVEDILOL 37.5 MG: 12.5 TABLET, FILM COATED ORAL at 09:40

## 2022-01-18 RX ADMIN — CARBIDOPA AND LEVODOPA 2 TABLET: 25; 100 TABLET ORAL at 21:00

## 2022-01-18 RX ADMIN — PANTOPRAZOLE SODIUM 40 MG: 40 TABLET, DELAYED RELEASE ORAL at 07:02

## 2022-01-18 RX ADMIN — ALBUMIN (HUMAN) 12.5 G: 0.25 INJECTION, SOLUTION INTRAVENOUS at 11:29

## 2022-01-18 RX ADMIN — GABAPENTIN 100 MG: 100 CAPSULE ORAL at 21:00

## 2022-01-18 RX ADMIN — DILTIAZEM HYDROCHLORIDE 30 MG: 30 TABLET, FILM COATED ORAL at 07:02

## 2022-01-18 RX ADMIN — ATORVASTATIN CALCIUM 40 MG: 40 TABLET, FILM COATED ORAL at 21:00

## 2022-01-18 RX ADMIN — VITAM B12 100 MCG: 100 TAB at 09:39

## 2022-01-18 RX ADMIN — CARBIDOPA AND LEVODOPA 2 TABLET: 25; 100 TABLET ORAL at 17:49

## 2022-01-18 RX ADMIN — CARBIDOPA AND LEVODOPA 2 TABLET: 25; 100 TABLET ORAL at 12:50

## 2022-01-18 RX ADMIN — CILOSTAZOL 100 MG: 50 TABLET ORAL at 07:02

## 2022-01-18 RX ADMIN — DILTIAZEM HYDROCHLORIDE 60 MG: 30 TABLET, FILM COATED ORAL at 17:02

## 2022-01-18 RX ADMIN — Medication 1000 UNITS: at 09:39

## 2022-01-18 RX ADMIN — BUMETANIDE 1 MG: 0.25 INJECTION INTRAMUSCULAR; INTRAVENOUS at 17:49

## 2022-01-18 RX ADMIN — ALBUMIN (HUMAN) 12.5 G: 0.25 INJECTION, SOLUTION INTRAVENOUS at 00:15

## 2022-01-18 RX ADMIN — BUSPIRONE HYDROCHLORIDE 5 MG: 5 TABLET ORAL at 09:40

## 2022-01-18 RX ADMIN — BUMETANIDE 1 MG: 0.25 INJECTION INTRAMUSCULAR; INTRAVENOUS at 09:45

## 2022-01-18 RX ADMIN — DULOXETINE 120 MG: 60 CAPSULE, DELAYED RELEASE ORAL at 09:40

## 2022-01-18 RX ADMIN — BUSPIRONE HYDROCHLORIDE 5 MG: 5 TABLET ORAL at 21:00

## 2022-01-18 RX ADMIN — ARIPIPRAZOLE 20 MG: 5 TABLET ORAL at 09:40

## 2022-01-18 RX ADMIN — CARBIDOPA AND LEVODOPA 2 TABLET: 25; 100 TABLET ORAL at 09:40

## 2022-01-18 NOTE — PROGRESS NOTES
Bedside shift change report given to [address] (oncoming nurse) by Jennifer Haro RN (offgoing nurse). Report included the following information SBAR, Kardex, ED Summary, Intake/Output, MAR, Recent Results and Cardiac Rhythm a.fib.

## 2022-01-18 NOTE — PROGRESS NOTES
Cardiology Progress Note        Admit Date: 1/15/2022  Admit Diagnosis: CHF exacerbation ([facility]) [I50.9]  Acute respiratory failure with hypoxia ([facility]) [J96.01]  Pulmonary edema [J81.1]  Atrial fibrillation with RVR ([facility]) [I48.91]  Acute on chronic systolic and diastolic heart failure, NYHA class 4 ([facility] [I50.43]  Date: 1/18/2022     Time: 1:31 PM      Subjective: Today, pt is very drowsy, lethargic. She denies chest pain and indicates that SOB is better. Her Hgb is downtrending. She denies having any recent stool. Remains in Afib, HR mildly elevated overnight. BP improved this a.m. Assessment and Plan     1. Afib    -HR a little intermittently elevated overnight   -Coreg 37.5 mg BID   -Trial Increase of diltiazem IR to 60 mg TID-   -May need PPM/AVN if not able to control with medications   -Eliquis on hold since last admission due to marked anemia,   -[id number]. Eliquis on hold since last admission due to anemia. May need to consider watchman as outpatient if unable to resume Eliquis. 2. Cardiomyopathy/acute on chronic HFmrEF   -Will defer diuretics to renal: currently on IV bumex 1 mg BID   -Continue coreg with hold parameters   -Avoid ace-I /ARB (MORENO on CKD)   -Off hydralazine and isordil to acomodate rate control medications     3. Mild-moderate MR    4. CAD   -s/p CABG. s/p PCI LAD 4/2021   -No ischemia or infarction on nuclear stress test 1/11/22   -No chest pain. HS troponin this admission low (41)   -On plavix, statin, BB    5. HTN   -bp low nl this a.m. Kena Farias -coreg, diltiazem    6. History of LBBB    7. Anemia   -unclear etiology   -Hgb 7.6, trending down. -Repeat stool for OB   -defer evaluation to primary team   -Eliquis on hold      8. MORENO on CKD   -baseline creatinine 1.6 (currently 1.9). Defer to renal  9. History of CVA   -on plavix, asa, statin  10. History of Carotid artery disease s/p Left CEA  11.  Advanced directives: DNR     Rajinder Angry. SUAD Sue    Saw and evaluated pt and agree with above assessment and plan. Trying to control HR with meds; increased diltiazem today. Aware of pacer, AVN ablation if she does not respond to medications. No OAC at this time, consider watchman outpt.  Diuretics per renal.   Ry Dangelo MD      [facility]  Past Medical History:   Diagnosis Date    Anxiety disorder     Atrial fibrillation ([facility]     CAD (coronary artery disease) 2007    stents, CABG x 3v    Carotid stenosis     Cervical stenosis of spinal canal 2019    Chronic kidney disease     Cough     CVA (cerebral vascular accident) ([facility] 2019    left lacunar infarct at head of caudate    Depression     AND CHRONIC ANXIETY    Diabetes ([facility]     GERD (gastroesophageal reflux disease)     High cholesterol     History of peptic ulcer     Bleeding ulcer with increased NSAID use    Hypertension     Left carotid stenosis 2019    s/p left CEA with Dr. Abhi Vaughn, old     PUD (peptic ulcer disease)     Stroke ([facility]     Tremor     Valvular heart disease       Social Hx  Social History     Socioeconomic History    Marital status: SINGLE     Spouse name: Not on file    Number of children: Not on file    Years of education: Not on file    Highest education level: Not on file   Occupational History    Occupation: Retired realestate/teacher   Tobacco Use    Smoking status: Former Smoker     Packs/day: 0.25     Years: 5.00     Pack years: 1.25     Types: Cigarettes     Quit date:      Years since quittin.0    Smokeless tobacco: Never Used   Substance and Sexual Activity    Alcohol use: Not Currently     Comment: Rare    Drug use: No    Sexual activity: Not on file   Other Topics Concern    Not on file   Social History Narrative    Lives in [facility]     Social Determinants of Health     Financial Resource Strain:     Difficulty of Paying Living Expenses: Not on file   Food Insecurity:     Worried About [facility] in the Last Year: Not on file    Juany of Food in the Last Year: Not on file   Transportation Needs:     Lack of Transportation (Medical): Not on file    Lack of Transportation (Non-Medical): Not on file   Physical Activity:     Days of Exercise per Week: Not on file    Minutes of Exercise per Session: Not on file   Stress:     Feeling of Stress : Not on file   Social Connections:     Frequency of Communication with Friends and Family: Not on file    Frequency of Social Gatherings with Friends and Family: Not on file    Attends Restoration Services: Not on file    Active Member of [address] [place] [address] or Organizations: Not on file    Attends Club or Organization Meetings: Not on file    Marital Status: Not on file   Intimate Partner Violence:     Fear of Current or Ex-Partner: Not on file    Emotionally Abused: Not on file    Physically Abused: Not on file    Sexually Abused: Not on file   Housing Stability:     Unable to Pay for Housing in the Last Year: Not on file    Number of [place] in the Last Year: Not on file    Unstable Housing in the Last Year: Not on file       ROS:  Pertinent items are noted in HPI. Objective:      Physical Exam:                Visit Vitals  BP (!) 100/56 (BP 1 Location: Right upper arm, BP Patient Position: At rest)   Pulse 74   Temp 98 °F (36.7 °C)   Resp 16   Ht 5' 5\" (1.651 m)   Wt 75 kg (165 lb 5.5 oz)   SpO2 96%   BMI 27.51 kg/m²          General Appearance:   Well developed, drowsy, ,alert and oriented x 3, and   individual in no acute distress. Ears/Nose/Mouth/Throat:    Hearing grossly normal.         Neck:  Supple. Chest:    Lungs diminished bases, poor deep inspiratory effort. Cardiovascular:   Irregularly irregular Regular rate and rhythm, S1, S2 normal, II/VI systolic murmur LLSB   Abdomen:    Soft, non-tender, bowel sounds are active. Extremities:   Tr left ankle edema   Skin:  Warm and dry.      Telemetry: afib, rate mildly elevated intermittently overnight. Data Review:    Labs:    Recent Results (from the past 24 hour(s))   GLUCOSE, POC    Collection Time: 01/17/22  4:55 PM   Result Value Ref Range    Glucose (POC) 65 65 - 117 mg/dL    Performed by [employer] [facility] [address], POC    Collection Time: 01/17/22  5:21 PM   Result Value Ref Range    Glucose (POC) 103 65 - 117 mg/dL    Performed by [address], POC    Collection Time: 01/17/22  9:17 PM   Result Value Ref Range    Glucose (POC) 185 (H) 65 - 117 mg/dL    Performed by [employer]    CBC WITH AUTOMATED DIFF    Collection Time: 01/18/22 12:14 AM   Result Value Ref Range    WBC 4.6 3.6 - 11.0 K/uL    RBC 2.35 (L) 3.80 - 5.20 M/uL    HGB 7.6 (L) 11.5 - 16.0 g/dL    HCT 23.3 (L) 35.0 - 47.0 %    MCV 99.1 (H) 80.0 - 99.0 FL    MCH 32.3 26.0 - 34.0 PG    MCHC 32.6 30.0 - 36.5 g/dL    RDW 13.1 11.5 - 14.5 %    PLATELET 314 102 - 042 K/uL    MPV 8.7 (L) 8.9 - 12.9 FL    NRBC 0.0 0  WBC    ABSOLUTE NRBC 0.00 0.00 - 0.01 K/uL    NEUTROPHILS 67 32 - 75 %    LYMPHOCYTES 16 12 - 49 %    MONOCYTES 11 5 - 13 %    EOSINOPHILS 6 0 - 7 %    BASOPHILS 0 0 - 1 %    IMMATURE GRANULOCYTES 0 0.0 - 0.5 %    ABS. NEUTROPHILS 3.1 1.8 - 8.0 K/UL    ABS. LYMPHOCYTES 0.7 (L) 0.8 - 3.5 K/UL    ABS. MONOCYTES 0.5 0.0 - 1.0 K/UL    ABS. EOSINOPHILS 0.3 0.0 - 0.4 K/UL    ABS. BASOPHILS 0.0 0.0 - 0.1 K/UL    ABS. IMM.  GRANS. 0.0 0.00 - 0.04 K/UL    DF SMEAR SCANNED      RBC COMMENTS TRENT CELLS  PRESENT        RBC COMMENTS MACROCYTOSIS  1+       METABOLIC PANEL, BASIC    Collection Time: 01/18/22 12:14 AM   Result Value Ref Range    Sodium 136 136 - 145 mmol/L    Potassium 4.1 3.5 - 5.1 mmol/L    Chloride 103 97 - 108 mmol/L    CO2 26 21 - 32 mmol/L    Anion gap 7 5 - 15 mmol/L    Glucose 135 (H) 65 - 100 mg/dL    BUN 53 (H) 6 - 20 MG/DL    Creatinine 1.97 (H) 0.55 - 1.02 MG/DL    BUN/Creatinine ratio 27 (H) 12 - 20      GFR est AA 30 (L) >60 ml/min/1.73m2 GFR est non-AA 24 (L) >60 ml/min/1.73m2    Calcium 8.9 8.5 - 10.1 MG/DL   GLUCOSE, POC    Collection Time: 01/18/22  8:45 AM   Result Value Ref Range    Glucose (POC) 136 (H) 65 - 117 mg/dL    Performed by Sergio Posadas P    GLUCOSE, POC    Collection Time: 01/18/22 12:11 PM   Result Value Ref Range    Glucose (POC) 160 (H) 65 - 117 mg/dL    Performed by Sergio Posadas P           Radiology:        Current Facility-Administered Medications   Medication Dose Route Frequency    dilTIAZem IR (CARDIZEM) tablet 60 mg  60 mg Oral TIDAC    albumin human 25% (BUMINATE) solution 12.5 g  12.5 g IntraVENous Q6H    insulin lispro (HUMALOG) injection   SubCUTAneous AC&HS    glucose chewable tablet 16 g  4 Tablet Oral PRN    dextrose (D50W) injection syrg 12.5-25 g  12.5-25 g IntraVENous PRN    glucagon (GLUCAGEN) injection 1 mg  1 mg IntraMUSCular PRN    ARIPiprazole (ABILIFY) tablet 20 mg  20 mg Oral DAILY    atorvastatin (LIPITOR) tablet 40 mg  40 mg Oral QHS    busPIRone (BUSPAR) tablet 5 mg  5 mg Oral TID    carbidopa-levodopa (SINEMET)  mg per tablet 2 Tablet  2 Tablet Oral QID    cholecalciferol (VITAMIN D3) (1000 Units /25 mcg) tablet 1,000 Units  1,000 Units Oral DAILY    cilostazoL (PLETAL) tablet 100 mg  100 mg Oral ACB&D    clopidogreL (PLAVIX) tablet 75 mg  75 mg Oral DAILY AFTER BREAKFAST    cyanocobalamin (VITAMIN B12) tablet 100 mcg  100 mcg Oral DAILY    DULoxetine (CYMBALTA) capsule 120 mg  120 mg Oral DAILY    gabapentin (NEURONTIN) capsule 100 mg  100 mg Oral QHS    nitroglycerin (NITROSTAT) tablet 0.4 mg  0.4 mg SubLINGual Q5MIN PRN    pantoprazole (PROTONIX) tablet 40 mg  40 mg Oral ACB    polyethylene glycol (MIRALAX) packet 17 g  17 g Oral DAILY PRN    senna-docusate (PERICOLACE) 8.6-50 mg per tablet 1 Tablet  1 Tablet Oral QHS    bumetanide (BUMEX) injection 1 mg  1 mg IntraVENous BID    albuterol-ipratropium (DUO-NEB) 2.5 MG-0.5 MG/3 ML  3 mL Nebulization Q6H PRN    carvediloL (COREG) tablet 37.5 mg  37.5 mg Oral BID WITH MEALS          Samir Lopes.  SUAD Sue     [facility] [address] [place] [address] Drive, [address] [place] [address],[address]   Emilia Head   [phone]

## 2022-01-18 NOTE — PROGRESS NOTES
[facility]  Hospitalist Group                                                                                          Hospitalist Progress Note  Zain Valerio MD  Answering service: [phone] OR [phone] from in house phone        Date of Service:  2022  NAME:  Eliot Newton  :  1940  MRN:  [id number]      Admission Summary:   [de-identified] yo female who was discharged from hospital on 1/15 after being admitted for CHF, Anemia/AFib/MORENO. Patient was on diuretics but then held on  due to hypotension. Pt had anemia and given 1 U PRBCs on . Pt's Eliquis stopped due to concern for bleeding. Patient's diuretics were not restarted and discharged to [facility]. Patient sent back to ER due to SOB. Interval history / Subjective:   F/u A fib with RVR  Now on 3l NC  BP low normal  Drowsy but easily arousable     Assessment & Plan:     Atrial Fib with RVR  - on Coreg/Cardizem, will hold for now  -Appreciate Cardiology  - not on [address] due to concerns for bleeding during previous admission     Acute respiratory failure with hypoxia due to Pulmonary edema  Acute on chronic systolic CHF NYHA III  - c/w IV bumex  - hold coreg for now  -daily weight  -I/O  - no ACEI/ARBs due to CKD  -Appreciate Nephrology/Cardiology    Drowsiness/acute metabolic encephalopathy  History of left lacunar infarct/acute CVA  history of left carotid stenosis s/p CEA  -ABG unremarkable  -if does not get better, will consider CT head, Neurology input  -Noted between 2021 to 2021 Buspar/neurontin/abilify was added to Cymbalta. Will talk to pharmacy tomorrow to see the indication in the patient     CKD 4-c/w diuretics-Appreciate nephrology  Parkinson's-c/w sinemet   CAD s/p CABG/stent-c/w ASA/Plavix/Statin/BB  HTN-BP low, will hold coreg/cardizem for now. Will give albumin    Multiple hospitalizations. 5 admissions since .  Consult palliative care    PT/OT SNF      Code status: DNR   DVT prophylaxis: SCDs    Plan: Continue IV diuresis. IV albumin    Care Plan discussed with: Patient/Family, Nurse and   Anticipated Disposition: SNF/LTC  Anticipated Discharge: Greater than 48 hours     Hospital Problems  Date Reviewed: 1/17/2022          Codes Class Noted POA    * (Principal) Pulmonary edema ICD-10-CM: J81.1  ICD-9-CM: 878  1/16/2022 Yes        Atrial fibrillation with RVR ([address] ICD-10-CM: I48.91  ICD-9-CM: 427.31  1/16/2022 Unknown        Acute respiratory failure with hypoxia (HCC) ICD-10-CM: J96.01  ICD-9-CM: 518.81  1/16/2022 Unknown        Acute on chronic systolic and diastolic heart failure, NYHA class 4 (HCC) ICD-10-CM: I50.43  ICD-9-CM: 428.43  1/16/2022 Unknown        CHF exacerbation (HCC) ICD-10-CM: I50.9  ICD-9-CM: 428.0  1/15/2022 Unknown                Review of Systems:   A comprehensive review of systems was negative except for that written in the HPI. Vital Signs:    Last 24hrs VS reviewed since prior progress note. Most recent are:  Visit Vitals  BP (!) 100/56 (BP 1 Location: Right upper arm, BP Patient Position: At rest)   Pulse 74   Temp 98 °F (36.7 °C)   Resp 16   Ht 5' 5\" (1.651 m)   Wt 75 kg (165 lb 5.5 oz)   SpO2 96%   BMI 27.51 kg/m²         Intake/Output Summary (Last 24 hours) at 1/18/2022 1352  Last data filed at 1/18/2022 6884  Gross per 24 hour   Intake    Output 1200 ml   Net -1200 ml        Physical Examination:     I had a face to face encounter with this patient and independently examined them on 1/18/2022 as outlined below:          Constitutional:  No acute distress, cooperative, pleasant    ENT:  Oral mucosa moist, oropharynx benign. Resp:  decreased BS with rales at bases   CV:  irregularly irregular     GI:  Soft, non distended, non tender. normoactive bowel sounds,     Musculoskeletal:  No edema, warm, 2+ pulses throughout    Neurologic:  Moves all extremities.   AAOx3, CN II-XII reviewed            Data Review:    Review and/or order of clinical lab test  Review and/or order of tests in the radiology section of CPT  Review and/or order of tests in the medicine section of CPT      Labs:     Recent Labs     01/18/22  0014 01/17/22  0617   WBC 4.6 5.2   HGB 7.6* 8.0*   HCT 23.3* 25.3*    225     Recent Labs     01/18/22  0014 01/17/22  0617 01/16/22  0745    137 137   K 4.1 4.0 4.0    104 104   CO2 26 26 27   BUN 53* 48* 44*   CREA 1.97* 1.91* 1.93*   * 106* 130*   CA 8.9 9.3 8.9   MG  --  2.1  --      No results for input(s): ALT, AP, TBIL, TBILI, TP, ALB, GLOB, GGT, AML, LPSE in the last 72 hours. No lab exists for component: SGOT, GPT, AMYP, HLPSE  No results for input(s): INR, PTP, APTT, INREXT, INREXT in the last 72 hours. No results for input(s): FE, TIBC, PSAT, FERR in the last 72 hours. Lab Results   Component Value Date/Time    Folate 7.8 12/14/2021 04:40 AM      No results for input(s): PH, PCO2, PO2 in the last 72 hours. No results for input(s): CPK, CKNDX, TROIQ in the last 72 hours.     No lab exists for component: CPKMB  Lab Results   Component Value Date/Time    Cholesterol, total 148 09/23/2020 04:27 AM    HDL Cholesterol 52 09/23/2020 04:27 AM    LDL, calculated 83.8 09/23/2020 04:27 AM    Triglyceride 61 09/23/2020 04:27 AM    CHOL/HDL Ratio 2.8 09/23/2020 04:27 AM     Lab Results   Component Value Date/Time    Glucose (POC) 160 (H) 01/18/2022 12:11 PM    Glucose (POC) 136 (H) 01/18/2022 08:45 AM    Glucose (POC) 185 (H) 01/17/2022 09:17 PM    Glucose (POC) 103 01/17/2022 05:21 PM    Glucose (POC) 65 01/17/2022 04:55 PM     Lab Results   Component Value Date/Time    Color YELLOW/STRAW 12/30/2021 08:23 PM    Appearance CLOUDY (A) 12/30/2021 08:23 PM    Specific gravity 1.017 12/30/2021 08:23 PM    pH (UA) 6.0 12/30/2021 08:23 PM    Protein 100 (A) 12/30/2021 08:23 PM    Glucose Negative 12/30/2021 08:23 PM    Ketone Negative 12/30/2021 08:23 PM    Bilirubin Negative 12/30/2021 08:23 PM    Urobilinogen 1.0 12/30/2021 08:23 PM Nitrites Negative 12/30/2021 08:23 PM    Leukocyte Esterase MODERATE (A) 12/30/2021 08:23 PM    Epithelial cells FEW 12/30/2021 08:23 PM    Bacteria 1+ (A) 12/30/2021 08:23 PM    WBC 0-4 12/30/2021 08:23 PM    RBC 0-5 12/30/2021 08:23 PM         Medications Reviewed:     Current Facility-Administered Medications   Medication Dose Route Frequency    dilTIAZem IR (CARDIZEM) tablet 60 mg  60 mg Oral TIDAC    albumin human 25% (BUMINATE) solution 12.5 g  12.5 g IntraVENous Q6H    insulin lispro (HUMALOG) injection   SubCUTAneous AC&HS    glucose chewable tablet 16 g  4 Tablet Oral PRN    dextrose (D50W) injection syrg 12.5-25 g  12.5-25 g IntraVENous PRN    glucagon (GLUCAGEN) injection 1 mg  1 mg IntraMUSCular PRN    ARIPiprazole (ABILIFY) tablet 20 mg  20 mg Oral DAILY    atorvastatin (LIPITOR) tablet 40 mg  40 mg Oral QHS    busPIRone (BUSPAR) tablet 5 mg  5 mg Oral TID    carbidopa-levodopa (SINEMET)  mg per tablet 2 Tablet  2 Tablet Oral QID    cholecalciferol (VITAMIN D3) (1000 Units /25 mcg) tablet 1,000 Units  1,000 Units Oral DAILY    cilostazoL (PLETAL) tablet 100 mg  100 mg Oral ACB&D    clopidogreL (PLAVIX) tablet 75 mg  75 mg Oral DAILY AFTER BREAKFAST    cyanocobalamin (VITAMIN B12) tablet 100 mcg  100 mcg Oral DAILY    DULoxetine (CYMBALTA) capsule 120 mg  120 mg Oral DAILY    gabapentin (NEURONTIN) capsule 100 mg  100 mg Oral QHS    nitroglycerin (NITROSTAT) tablet 0.4 mg  0.4 mg SubLINGual Q5MIN PRN    pantoprazole (PROTONIX) tablet 40 mg  40 mg Oral ACB    polyethylene glycol (MIRALAX) packet 17 g  17 g Oral DAILY PRN    senna-docusate (PERICOLACE) 8.6-50 mg per tablet 1 Tablet  1 Tablet Oral QHS    bumetanide (BUMEX) injection 1 mg  1 mg IntraVENous BID    albuterol-ipratropium (DUO-NEB) 2.5 MG-0.5 MG/3 ML  3 mL Nebulization Q6H PRN    carvediloL (COREG) tablet 37.5 mg  37.5 mg Oral BID WITH MEALS ______________________________________________________________________  EXPECTED LENGTH OF STAY: - - -  ACTUAL LENGTH OF STAY:          2                 Karley Hoffman MD

## 2022-01-18 NOTE — PROGRESS NOTES
Nephrology Progress Note  Ne Hernandez  Date of Admission : 1/15/2022    CC:  Follow up for MORENO on CKD       Assessment and Plan     MORENO on CKD:  - diff: progressive disease vs CRS  - last  renal U/S neg for hydro, + for bladder distention  - Cr stable  - continue IV Bumex 1 mg BID   - daily labs and strict I/Os     Afib with RVR  - per cards  - on carvedilol and Diltiazem      CKD IV:  - baseline increasing, likely around 1.6 to 1.8 now     HTN:  - BP stable     Encephalopathy:  - would check ABG  - cont diuretics  Acute on Chronic HFpEF  CAD, hx of CABG  - ECHO w/ EF 55-60%  - rapid COVID neg  - diuretics as above         Parkinsons  Hx of CVA  PAD w/ L CEA  AAA       Interval History:  Seen and examined. Pt lethargic this AM.  Cr overall stable along with UOP. Unable to obtain ROS. Current Medications: all current  Medications have been eviewed in EPIC  Review of Systems: Pertinent items are noted in HPI. Objective:  Vitals:    Vitals:    01/18/22 0941 01/18/22 0942 01/18/22 0943 01/18/22 1000   BP:    113/60   Pulse:    91   Resp:    17   Temp:       SpO2: 90% 90% 94% 93%   Weight:       Height:         Intake and Output:  01/18 0701 - 01/18 1900  In: -   Out: 900 [Urine:900]  01/16 1901 - 01/18 0700  In: -   Out: 900 [Urine:900]    Physical Examination:  General: Confused, lethargic  Neck:  Supple, no mass  Resp:  Lungs CTA B/L, no wheezing , normal respiratory effort  CV:  RRR,  no murmur or rub, trace LE edema  GI:  Soft, NT, + Bowel sounds, no hepatosplenomegaly  Neurologic:  confused  Psych:             Unable to assess   Skin:  No Rash  :  No pittman    []    High complexity decision making was performed  []    Patient is at high-risk of decompensation with multiple organ involvement    Lab Data Personally Reviewed: I have reviewed all the pertinent labs, microbiology data and radiology studies during assessment.     Recent Labs     01/18/22  0014 01/17/22  0617 01/16/22  0745    137 137   K 4.1 4.0 4.0    104 104   CO2 26 26 27   * 106* 130*   BUN 53* 48* 44*   CREA 1.97* 1.91* 1.93*   CA 8.9 9.3 8.9   MG  --  2.1  --      Recent Labs     01/18/22  0014 01/17/22  0617 01/16/22  0745   WBC 4.6 5.2 7.0   HGB 7.6* 8.0* 8.8*   HCT 23.3* 25.3* 27.5*    225 234     No results found for: SDES  Lab Results   Component Value Date/Time    Culture result: NO GROWTH 5 DAYS 01/07/2022 08:37 PM    Culture result: MRSA NOT PRESENT 12/10/2021 06:53 AM    Culture result:  12/10/2021 06:53 AM     Screening of patient nares for MRSA is for surveillance purposes and, if positive, to facilitate isolation considerations in high risk settings. It is not intended for automatic decolonization interventions per se as regimens are not sufficiently effective to warrant routine use.      Recent Results (from the past 24 hour(s))   GLUCOSE, POC    Collection Time: 01/17/22 11:57 AM   Result Value Ref Range    Glucose (POC) 397 (H) 65 - 117 mg/dL    Performed by Sergio DUTTON    GLUCOSE, POC    Collection Time: 01/17/22  4:55 PM   Result Value Ref Range    Glucose (POC) 65 65 - 117 mg/dL    Performed by Nadege Callaway 85, POC    Collection Time: 01/17/22  5:21 PM   Result Value Ref Range    Glucose (POC) 103 65 - 117 mg/dL    Performed by Felix Maxwell    GLUCOSE, POC    Collection Time: 01/17/22  9:17 PM   Result Value Ref Range    Glucose (POC) 185 (H) 65 - 117 mg/dL    Performed by Felix Maxwell    CBC WITH AUTOMATED DIFF    Collection Time: 01/18/22 12:14 AM   Result Value Ref Range    WBC 4.6 3.6 - 11.0 K/uL    RBC 2.35 (L) 3.80 - 5.20 M/uL    HGB 7.6 (L) 11.5 - 16.0 g/dL    HCT 23.3 (L) 35.0 - 47.0 %    MCV 99.1 (H) 80.0 - 99.0 FL    MCH 32.3 26.0 - 34.0 PG    MCHC 32.6 30.0 - 36.5 g/dL    RDW 13.1 11.5 - 14.5 %    PLATELET 781 350 - 365 K/uL    MPV 8.7 (L) 8.9 - 12.9 FL    NRBC 0.0 0  WBC    ABSOLUTE NRBC 0.00 0.00 - 0.01 K/uL    NEUTROPHILS 67 32 - 75 % LYMPHOCYTES 16 12 - 49 %    MONOCYTES 11 5 - 13 %    EOSINOPHILS 6 0 - 7 %    BASOPHILS 0 0 - 1 %    IMMATURE GRANULOCYTES 0 0.0 - 0.5 %    ABS. NEUTROPHILS 3.1 1.8 - 8.0 K/UL    ABS. LYMPHOCYTES 0.7 (L) 0.8 - 3.5 K/UL    ABS. MONOCYTES 0.5 0.0 - 1.0 K/UL    ABS. EOSINOPHILS 0.3 0.0 - 0.4 K/UL    ABS. BASOPHILS 0.0 0.0 - 0.1 K/UL    ABS. IMM. GRANS. 0.0 0.00 - 0.04 K/UL    DF SMEAR SCANNED      RBC COMMENTS TRENT CELLS  PRESENT        RBC COMMENTS MACROCYTOSIS  1+       METABOLIC PANEL, BASIC    Collection Time: 01/18/22 12:14 AM   Result Value Ref Range    Sodium 136 136 - 145 mmol/L    Potassium 4.1 3.5 - 5.1 mmol/L    Chloride 103 97 - 108 mmol/L    CO2 26 21 - 32 mmol/L    Anion gap 7 5 - 15 mmol/L    Glucose 135 (H) 65 - 100 mg/dL    BUN 53 (H) 6 - 20 MG/DL    Creatinine 1.97 (H) 0.55 - 1.02 MG/DL    BUN/Creatinine ratio 27 (H) 12 - 20      GFR est AA 30 (L) >60 ml/min/1.73m2    GFR est non-AA 24 (L) >60 ml/min/1.73m2    Calcium 8.9 8.5 - 10.1 MG/DL   GLUCOSE, POC    Collection Time: 01/18/22  8:45 AM   Result Value Ref Range    Glucose (POC) 136 (H) 65 - 117 mg/dL    Performed by Saritha Berg MD  [address]  Phone - [phone]   Fax - [phone]  [email or web address]. [email or web address]

## 2022-01-18 NOTE — PROGRESS NOTES
pts systolic BP low 62L. Pt asymptomatic. Jenelle Delgadillo MD made aware. Received verbal orders to administer albumin.

## 2022-01-18 NOTE — PROGRESS NOTES
Problem: Self Care Deficits Care Plan (Adult)  Goal: *Acute Goals and Plan of Care (Insert Text)  Description: FUNCTIONAL STATUS PRIOR TO ADMISSION: Prior to recent admissions (most recent 1/7/2022), pt was functional at the w/c level and modified independent with functional transfers to and from the wheelchair. She was discharged 1/14/2022 to [facility] to receive therapy services with believed plan to transition back to LTC and readmitted 1/15/2022 for SOB. HOME SUPPORT: The patient lived at [facility], however following last admission pt resided in SNF to receive therapy services and required assist from staff for mobility/ADL/IADL tasks. Occupational Therapy Goals  Initiated 1/17/2022  1. Patient will perform seated grooming task with modified independence within 7 day(s). 2.  Patient will perform anterior bathing, seated, with supervision/set up within 7 day(s). 3.  Patient will perform lower body dressing with moderate assistance and using AE PRN within 7 day(s). 4.  Patient will perform toilet transfers with moderate assistance within 7 day(s). 5.  Patient will perform all aspects of toileting with moderate assistance  within 7 day(s). 6.  Patient will utilize energy conservation techniques during functional activities with verbal cues within 7 day(s). Outcome: Progressing Towards Goal     OCCUPATIONAL THERAPY TREATMENT  Patient: Tran Cheatham (32 y.o. female)  Date: 1/18/2022  Diagnosis: CHF exacerbation ([facility] [I50.9]  Acute respiratory failure with hypoxia ([address] [J96.01]  Pulmonary edema [J81.1]  Atrial fibrillation with RVR ([facility]) [I48.91]  Acute on chronic systolic and diastolic heart failure, NYHA class 4 ([address] [I50.43] Pulmonary edema       Precautions: Fall,Skin,Contact  Chart, occupational therapy assessment, plan of care, and goals were reviewed. ASSESSMENT  Patient continues with skilled OT services and is progressing towards goals.   Pt received semi-supine, resting, but alerting to voice. Pt agreeable to participation in therapy session. BP taken prior to mobilization, in 90's/50's and pt asymptomatic. Transferred to EOB and BP improving to 110's/60's. Pt progressed to EOB with mod A and VC for use of bed rails for assistance during transfer. Once seated, pt performed grooming with up to min A for posterior reach for combing hair. Pt indicating quick fatigue and requesting to return to supine. Continue to recommend SNF at d/c to maximize functional independence and performance of ADL/IADL tasks. Current Level of Function Impacting Discharge (ADLs): mod A bed mobility, set-up to min A seated UB ADLs    Other factors to consider for discharge: PLOF         PLAN :  Patient continues to benefit from skilled intervention to address the above impairments. Continue treatment per established plan of care to address goals. Recommendation for discharge: (in order for the patient to meet his/her long term goals)  Therapy up to 5 days/week in SNF setting    This discharge recommendation:  Has been made in collaboration with the attending provider and/or case management    IF patient discharges home will need the following DME: TBD       SUBJECTIVE:   Patient stated I would love to comb my hair! William Morel    OBJECTIVE DATA SUMMARY:   Cognitive/Behavioral Status:  Neurologic State: Alert  Orientation Level: Oriented to place;Oriented to person;Oriented to situation;Disoriented to time  Cognition: Follows commands             Functional Mobility and Transfers for ADLs:  Bed Mobility:  Supine to Sit: Moderate assistance;Bed Modified (HOB elevated and using bed rails)  Sit to Supine: Moderate assistance; Additional time  Scooting: Moderate assistance;Maximum assistance    Transfers:             Balance:  Sitting: Intact  Sitting - Static: Good (unsupported)  Sitting - Dynamic: Good (unsupported); Fair (occasional)    ADL Intervention:       Grooming  Washing Hands: Set-up  Brushing/Combing Hair: Minimum assistance (up to min A posterior sections)  Cues: Verbal cues provided;Physical assistance                   Lower Body Dressing Assistance  Socks: Maximum assistance  Position Performed: Seated edge of bed  Cues: Physical assistance    Toileting  Toileting Assistance: Total assistance(dependent)           Pain:  None reported     Activity Tolerance:   Fair    After treatment patient left in no apparent distress:   Supine in bed, Heels elevated for pressure relief, Call bell within reach, Bed / chair alarm activated and Side rails x 3    COMMUNICATION/COLLABORATION:   The patients plan of care was discussed with: Registered nurse.      Sary Burton OT  Time Calculation: 28 mins

## 2022-01-18 NOTE — PROGRESS NOTES
0800:  Verbal bedside report given to Maggie Swann RN oncoming nurse by June Jarquin RN off-going nurse. Report included current pt status and condition, recent results, hx of present illness, heart rate and rhythm, and respiratory status.

## 2022-01-19 PROBLEM — I44.7 LEFT BUNDLE BRANCH BLOCK: Status: ACTIVE | Noted: 2022-01-01

## 2022-01-19 LAB
ANION GAP SERPL CALC-SCNC: 7 MMOL/L (ref 5–15)
BASOPHILS # BLD: 0 K/UL (ref 0–0.1)
BASOPHILS NFR BLD: 1 % (ref 0–1)
BUN SERPL-MCNC: 52 MG/DL (ref 6–20)
BUN/CREAT SERPL: 27 (ref 12–20)
CALCIUM SERPL-MCNC: 8.9 MG/DL (ref 8.5–10.1)
CHLORIDE SERPL-SCNC: 104 MMOL/L (ref 97–108)
CO2 SERPL-SCNC: 25 MMOL/L (ref 21–32)
CREAT SERPL-MCNC: 1.91 MG/DL (ref 0.55–1.02)
DIFFERENTIAL METHOD BLD: ABNORMAL
EOSINOPHIL # BLD: 0.2 K/UL (ref 0–0.4)
EOSINOPHIL NFR BLD: 6 % (ref 0–7)
ERYTHROCYTE [DISTWIDTH] IN BLOOD BY AUTOMATED COUNT: 13.2 % (ref 11.5–14.5)
GLUCOSE BLD STRIP.AUTO-MCNC: 124 MG/DL (ref 65–117)
GLUCOSE BLD STRIP.AUTO-MCNC: 127 MG/DL (ref 65–117)
GLUCOSE BLD STRIP.AUTO-MCNC: 154 MG/DL (ref 65–117)
GLUCOSE BLD STRIP.AUTO-MCNC: 155 MG/DL (ref 65–117)
GLUCOSE BLD STRIP.AUTO-MCNC: 156 MG/DL (ref 65–117)
GLUCOSE SERPL-MCNC: 94 MG/DL (ref 65–100)
HCT VFR BLD AUTO: 23.3 % (ref 35–47)
HGB BLD-MCNC: 7.5 G/DL (ref 11.5–16)
IMM GRANULOCYTES # BLD AUTO: 0 K/UL (ref 0–0.04)
IMM GRANULOCYTES NFR BLD AUTO: 0 % (ref 0–0.5)
LYMPHOCYTES # BLD: 0.8 K/UL (ref 0.8–3.5)
LYMPHOCYTES NFR BLD: 19 % (ref 12–49)
MCH RBC QN AUTO: 32.2 PG (ref 26–34)
MCHC RBC AUTO-ENTMCNC: 32.2 G/DL (ref 30–36.5)
MCV RBC AUTO: 100 FL (ref 80–99)
MONOCYTES # BLD: 0.5 K/UL (ref 0–1)
MONOCYTES NFR BLD: 12 % (ref 5–13)
NEUTS SEG # BLD: 2.5 K/UL (ref 1.8–8)
NEUTS SEG NFR BLD: 62 % (ref 32–75)
NRBC # BLD: 0 K/UL (ref 0–0.01)
NRBC BLD-RTO: 0 PER 100 WBC
PLATELET # BLD AUTO: 209 K/UL (ref 150–400)
PMV BLD AUTO: 9.5 FL (ref 8.9–12.9)
POTASSIUM SERPL-SCNC: 4.2 MMOL/L (ref 3.5–5.1)
RBC # BLD AUTO: 2.33 M/UL (ref 3.8–5.2)
RBC MORPH BLD: ABNORMAL
RBC MORPH BLD: ABNORMAL
SERVICE CMNT-IMP: ABNORMAL
SODIUM SERPL-SCNC: 136 MMOL/L (ref 136–145)
WBC # BLD AUTO: 4 K/UL (ref 3.6–11)

## 2022-01-19 PROCEDURE — 74011250637 HC RX REV CODE- 250/637: Performed by: NURSE PRACTITIONER

## 2022-01-19 PROCEDURE — 97530 THERAPEUTIC ACTIVITIES: CPT

## 2022-01-19 PROCEDURE — 82962 GLUCOSE BLOOD TEST: CPT

## 2022-01-19 PROCEDURE — 99232 SBSQ HOSP IP/OBS MODERATE 35: CPT | Performed by: INTERNAL MEDICINE

## 2022-01-19 PROCEDURE — 74011250637 HC RX REV CODE- 250/637: Performed by: HOSPITALIST

## 2022-01-19 PROCEDURE — 94760 N-INVAS EAR/PLS OXIMETRY 1: CPT

## 2022-01-19 PROCEDURE — 85025 COMPLETE CBC W/AUTO DIFF WBC: CPT

## 2022-01-19 PROCEDURE — 65660000001 HC RM ICU INTERMED STEPDOWN

## 2022-01-19 PROCEDURE — 74011636637 HC RX REV CODE- 636/637: Performed by: HOSPITALIST

## 2022-01-19 PROCEDURE — 77010033678 HC OXYGEN DAILY

## 2022-01-19 PROCEDURE — 36415 COLL VENOUS BLD VENIPUNCTURE: CPT

## 2022-01-19 PROCEDURE — 80048 BASIC METABOLIC PNL TOTAL CA: CPT

## 2022-01-19 PROCEDURE — 51798 US URINE CAPACITY MEASURE: CPT

## 2022-01-19 RX ORDER — DILTIAZEM HYDROCHLORIDE 30 MG/1
30 TABLET, FILM COATED ORAL
Status: DISCONTINUED | OUTPATIENT
Start: 2022-01-19 | End: 2022-01-27 | Stop reason: HOSPADM

## 2022-01-19 RX ORDER — CARVEDILOL 12.5 MG/1
37.5 TABLET ORAL 2 TIMES DAILY WITH MEALS
Status: DISCONTINUED | OUTPATIENT
Start: 2022-01-19 | End: 2022-01-20

## 2022-01-19 RX ORDER — BUSPIRONE HYDROCHLORIDE 5 MG/1
5 TABLET ORAL
Status: DISCONTINUED | OUTPATIENT
Start: 2022-01-19 | End: 2022-01-27 | Stop reason: HOSPADM

## 2022-01-19 RX ORDER — BUMETANIDE 1 MG/1
1 TABLET ORAL 2 TIMES DAILY
Status: DISCONTINUED | OUTPATIENT
Start: 2022-01-19 | End: 2022-01-22

## 2022-01-19 RX ORDER — BUMETANIDE 1 MG/1
1 TABLET ORAL 2 TIMES DAILY
Status: DISCONTINUED | OUTPATIENT
Start: 2022-01-19 | End: 2022-01-19

## 2022-01-19 RX ADMIN — PANTOPRAZOLE SODIUM 40 MG: 40 TABLET, DELAYED RELEASE ORAL at 08:44

## 2022-01-19 RX ADMIN — ATORVASTATIN CALCIUM 40 MG: 40 TABLET, FILM COATED ORAL at 23:48

## 2022-01-19 RX ADMIN — DILTIAZEM HYDROCHLORIDE 30 MG: 30 TABLET, FILM COATED ORAL at 16:30

## 2022-01-19 RX ADMIN — ARIPIPRAZOLE 20 MG: 5 TABLET ORAL at 09:01

## 2022-01-19 RX ADMIN — CARVEDILOL 37.5 MG: 12.5 TABLET, FILM COATED ORAL at 13:36

## 2022-01-19 RX ADMIN — CARBIDOPA AND LEVODOPA 2 TABLET: 25; 100 TABLET ORAL at 09:00

## 2022-01-19 RX ADMIN — CARBIDOPA AND LEVODOPA 2 TABLET: 25; 100 TABLET ORAL at 23:48

## 2022-01-19 RX ADMIN — CILOSTAZOL 100 MG: 50 TABLET ORAL at 08:44

## 2022-01-19 RX ADMIN — CARVEDILOL 37.5 MG: 12.5 TABLET, FILM COATED ORAL at 18:46

## 2022-01-19 RX ADMIN — VITAM B12 100 MCG: 100 TAB at 09:01

## 2022-01-19 RX ADMIN — CILOSTAZOL 100 MG: 50 TABLET ORAL at 18:46

## 2022-01-19 RX ADMIN — BUMETANIDE 1 MG: 1 TABLET ORAL at 13:36

## 2022-01-19 RX ADMIN — DULOXETINE 120 MG: 60 CAPSULE, DELAYED RELEASE ORAL at 09:00

## 2022-01-19 RX ADMIN — BUSPIRONE HYDROCHLORIDE 5 MG: 5 TABLET ORAL at 09:00

## 2022-01-19 RX ADMIN — CARBIDOPA AND LEVODOPA 2 TABLET: 25; 100 TABLET ORAL at 13:36

## 2022-01-19 RX ADMIN — CLOPIDOGREL 75 MG: 75 TABLET, FILM COATED ORAL at 09:00

## 2022-01-19 RX ADMIN — DILTIAZEM HYDROCHLORIDE 30 MG: 30 TABLET, FILM COATED ORAL at 13:36

## 2022-01-19 RX ADMIN — Medication 2 UNITS: at 09:02

## 2022-01-19 RX ADMIN — CARBIDOPA AND LEVODOPA 2 TABLET: 25; 100 TABLET ORAL at 18:42

## 2022-01-19 RX ADMIN — BUMETANIDE 1 MG: 1 TABLET ORAL at 18:42

## 2022-01-19 RX ADMIN — SENNOSIDES AND DOCUSATE SODIUM 1 TABLET: 50; 8.6 TABLET ORAL at 23:48

## 2022-01-19 RX ADMIN — Medication 1000 UNITS: at 09:01

## 2022-01-19 NOTE — PROGRESS NOTES
Pts HR started to drop to low 40s-50s. BP low. Pt still drowsy but oriented x4. Pt wakes up to talk when spoken too. Devon Das MD was paged. Received verbal orders to administer albumin IV q6h. Conversation with provider Devon Das MD posted below. \"pts been very drowsy today, im reading nephrologist note and they recommended an ABG, did you want one ordered? \"  Read 2:41 PM     1/18/22 2:42 PM   Yes but I doubt that would be abnormal     1/18/22 3:09 PM   \"yeah I told him that but I just thought I would let you know. don't know if it could be her HBG? shes not bleeding anywhere that I can see but I see it was trending down. but I had her yesterday shes definitely more drowsy. \"  Read 3:40 PM      1/18/22 3:40 PM   I saw that. Thanks      1/18/22 6:55 PM   \"FYI pt was having some rhythm changes still a.fib but with a BBB and PVCs. pt asymptomatic. vitals stable\"  Read 6:56 PM     1/18/22 6:56 PM   Ok thanks     1/18/22 6:57 PM   Will talk to cardiologist tomorrow     1/18/22 7:18 PM   Elise Crockett you still here? \"  Read 7:20 PM     1/18/22 7:20 PM   Yes    1/18/22 7:21 PM   \"this pts HR is low 40s to 50s and her bp is 77/45.  shes still drowsy but talks when you talk to her\"  Read 7:21 PM     1/18/22 7:21 PM   I tried to call and have you paged but they said you left

## 2022-01-19 NOTE — PROGRESS NOTES
0700  Overnight, BP has stabilized, MAP greater than 85, Albumin held at 0200. Pt continues to be pretty drowsy, but is easily arousable, A/O x4. Relayed to oncoming RN. Will continue to monitor.

## 2022-01-19 NOTE — PROGRESS NOTES
Nephrology Progress Note  Norma Longest  Date of Admission : 1/15/2022    CC:  Follow up for MORENO on CKD       Assessment and Plan     MORENO on CKD:  - diff: progressive disease vs CRS  - Cr stable, volume better  - change to po diuretic today     Afib with RVR  - per cards     CKD IV:  - baseline increasing, likely around 1.6 to 1.8 now     HTN:  - BP stable     Encephalopathy:  - w/u per primary team    Acute on Chronic HFpEF  CAD, hx of CABG  - ECHO w/ EF 55-60%  - rapid COVID neg  - diuretics as above         Parkinsons  Hx of CVA  PAD w/ L CEA  AAA       Interval History:  Seen and examined. Pt lethargic this AM.  Cr overall stable along with UOP. Unable to obtain ROS. Plans for possible CT head today. Current Medications: all current  Medications have been eviewed in EPIC  Review of Systems: Pertinent items are noted in HPI. Objective:  Vitals:    Vitals:    01/19/22 0305 01/19/22 0400 01/19/22 0600 01/19/22 0724   BP: (!) 151/61   130/70   Pulse: 82 77 77 74   Resp: 14   14   Temp: 98.5 °F (36.9 °C)   98.2 °F (36.8 °C)   SpO2: 95%   94%   Weight:       Height:         Intake and Output:  01/19 0701 - 01/19 1900  In: -   Out: 200 [Urine:200]  01/17 1901 - 01/19 0700  In: -   Out: 3112 [MBVVE:0394]    Physical Examination:  General: Confused, lethargic  Neck:  Supple, no mass  Resp:  Lungs CTA B/L, no wheezing , normal respiratory effort  CV:  RRR,  no murmur or rub, trace LE edema  GI:  Soft, NT, + Bowel sounds, no hepatosplenomegaly  Neurologic:  confused  Psych:             Unable to assess   Skin:  No Rash  :  No pittman    []    High complexity decision making was performed  []    Patient is at high-risk of decompensation with multiple organ involvement    Lab Data Personally Reviewed: I have reviewed all the pertinent labs, microbiology data and radiology studies during assessment.     Recent Labs     01/19/22  0433 01/18/22  0014 01/17/22  0617    136 137   K 4.2 4.1 4.0    103 104   CO2 25 26 26   GLU 94 135* 106*   BUN 52* 53* 48*   CREA 1.91* 1.97* 1.91*   CA 8.9 8.9 9.3   MG  --   --  2.1     Recent Labs     01/19/22  0433 01/18/22  0014 01/17/22  0617   WBC 4.0 4.6 5.2   HGB 7.5* 7.6* 8.0*   HCT 23.3* 23.3* 25.3*    204 225     No results found for: SDES  Lab Results   Component Value Date/Time    Culture result: NO GROWTH 5 DAYS 01/07/2022 08:37 PM    Culture result: MRSA NOT PRESENT 12/10/2021 06:53 AM    Culture result:  12/10/2021 06:53 AM     Screening of patient nares for MRSA is for surveillance purposes and, if positive, to facilitate isolation considerations in high risk settings. It is not intended for automatic decolonization interventions per se as regimens are not sufficiently effective to warrant routine use.      Recent Results (from the past 24 hour(s))   GLUCOSE, POC    Collection Time: 01/18/22 12:11 PM   Result Value Ref Range    Glucose (POC) 160 (H) 65 - 117 mg/dL    Performed by Sergio DUTTON    GLUCOSE, POC    Collection Time: 01/18/22  5:01 PM   Result Value Ref Range    Glucose (POC) 169 (H) 65 - 117 mg/dL    Performed by [facility]    POC G3 - PUL    Collection Time: 01/18/22  5:20 PM   Result Value Ref Range    FIO2 (POC) 3 %    pH (POC) 7.42 7.35 - 7.45      pCO2 (POC) 40.6 35.0 - 45.0 MMHG    pO2 (POC) 62 (L) 80 - 100 MMHG    HCO3 (POC) 26.3 (H) 22 - 26 MMOL/L    sO2 (POC) 91.8 (L) 92 - 97 %    Base excess (POC) 1.6 mmol/L    Site RIGHT BRACHIAL      Device: NASAL CANNULA      Allens test (POC) Positive      Specimen type (POC) ARTERIAL     GLUCOSE, POC    Collection Time: 01/18/22  9:27 PM   Result Value Ref Range    Glucose (POC) 126 (H) 65 - 117 mg/dL    Performed by Yesenia Daniel    CBC WITH AUTOMATED DIFF    Collection Time: 01/19/22  4:33 AM   Result Value Ref Range    WBC 4.0 3.6 - 11.0 K/uL    RBC 2.33 (L) 3.80 - 5.20 M/uL    HGB 7.5 (L) 11.5 - 16.0 g/dL    HCT 23.3 (L) 35.0 - 47.0 %    .0 (H) 80.0 - 99.0 FL    MCH 32.2 26.0 - 34.0 PG    MCHC 32.2 30.0 - 36.5 g/dL    RDW 13.2 11.5 - 14.5 %    PLATELET 017 782 - 050 K/uL    MPV 9.5 8.9 - 12.9 FL    NRBC 0.0 0  WBC    ABSOLUTE NRBC 0.00 0.00 - 0.01 K/uL    NEUTROPHILS 62 32 - 75 %    LYMPHOCYTES 19 12 - 49 %    MONOCYTES 12 5 - 13 %    EOSINOPHILS 6 0 - 7 %    BASOPHILS 1 0 - 1 %    IMMATURE GRANULOCYTES 0 0.0 - 0.5 %    ABS. NEUTROPHILS 2.5 1.8 - 8.0 K/UL    ABS. LYMPHOCYTES 0.8 0.8 - 3.5 K/UL    ABS. MONOCYTES 0.5 0.0 - 1.0 K/UL    ABS. EOSINOPHILS 0.2 0.0 - 0.4 K/UL    ABS. BASOPHILS 0.0 0.0 - 0.1 K/UL    ABS. IMM. GRANS. 0.0 0.00 - 0.04 K/UL    DF SMEAR SCANNED      RBC COMMENTS MACROCYTOSIS  1+        RBC COMMENTS POLYCHROMASIA  PRESENT       METABOLIC PANEL, BASIC    Collection Time: 01/19/22  4:33 AM   Result Value Ref Range    Sodium 136 136 - 145 mmol/L    Potassium 4.2 3.5 - 5.1 mmol/L    Chloride 104 97 - 108 mmol/L    CO2 25 21 - 32 mmol/L    Anion gap 7 5 - 15 mmol/L    Glucose 94 65 - 100 mg/dL    BUN 52 (H) 6 - 20 MG/DL    Creatinine 1.91 (H) 0.55 - 1.02 MG/DL    BUN/Creatinine ratio 27 (H) 12 - 20      GFR est AA 31 (L) >60 ml/min/1.73m2    GFR est non-AA 25 (L) >60 ml/min/1.73m2    Calcium 8.9 8.5 - 10.1 MG/DL   GLUCOSE, POC    Collection Time: 01/19/22  8:21 AM   Result Value Ref Range    Glucose (POC) 155 (H) 65 - 117 mg/dL    Performed by Isabel Shepherd MD  [facility]   [id number] [place] [address]  Phone - [phone]   Fax - [phone]  [email or web address]

## 2022-01-19 NOTE — PROGRESS NOTES
Problem: Mobility Impaired (Adult and Pediatric)  Goal: *Acute Goals and Plan of Care (Insert Text)  Description: FUNCTIONAL STATUS PRIOR TO ADMISSION: The patient was functional at the wheelchair level and reports requiring assist for transfers using a walker. HOME SUPPORT PRIOR TO ADMISSION: The patient lived in a SNF and received assist of staff for mobility prn. Physical Therapy Goals  Initiated 1/17/2022  1. Patient will move from supine to sit and sit to supine , scoot up and down, and roll side to side in bed with minimal assistance/contact guard assist within 7 day(s). 2.  Patient will transfer from bed to chair and chair to bed with moderate assistance  using the least restrictive device within 7 day(s). 3.  Patient will perform sit to stand with moderate assistance  within 7 day(s). 4.  Patient will ambulate with moderate assistance  for 5 feet with the least restrictive device within 7 day(s). Outcome: Progressing Towards Goal   PHYSICAL THERAPY TREATMENT  Patient: Damari Santos (78 y.o. female)  Date: 1/19/2022  Diagnosis: CHF exacerbation ([address] [I50.9]  Acute respiratory failure with hypoxia ([address] [J96.01]  Pulmonary edema [J81.1]  Atrial fibrillation with RVR ([facility]) [I48.91]  Acute on chronic systolic and diastolic heart failure, NYHA class 4 ([address] [I50.43] Pulmonary edema       Precautions: Fall,Skin,Contact  Chart, physical therapy assessment, plan of care and goals were reviewed. ASSESSMENT  Patient continues with skilled PT services and is progressing towards goals. Barriers to indep with functional mobility include general weakness, joint stiffness related to h/o PD, impaired balance, decreased activity tolerance, increased risk for fall. Pt received in supine sleeping with 3L O2 in place. Awakened to verbal stim, oriented x 4.  Pt tolerated rolling multiple times with max A for continence care; EOB activity related to grooming with supervision for balance; and sit to partial stand x 2 with max A for blocking feet to prevent sliding, lift/balance, bilat UE support (chair back used due to no walker present). Pt maintained stable vitals on 3L throughout session. Remained alert after mobilization and eager to eat breakfast.    Pt will benefit from con't PT for mobility progression as tolerated. Recommend follow up SNF rehab at d/c. Current Level of Function Impacting Discharge (mobility/balance): bed mob mod A, sit to partial stand max A    Other factors to consider for discharge: transferred from SNF         PLAN :  Patient continues to benefit from skilled intervention to address the above impairments. Continue treatment per established plan of care. to address goals. Recommendation for discharge: (in order for the patient to meet his/her long term goals)  Therapy up to 5 days/week in SNF setting    This discharge recommendation:  Has been made in collaboration with the attending provider and/or case management    IF patient discharges home will need the following DME: to be determined (TBD)       SUBJECTIVE:   Patient stated Oh good, breakfast; I'm hungry.     OBJECTIVE DATA SUMMARY:   Critical Behavior:  Neurologic State: Drowsy  Orientation Level: Oriented to person,Oriented to place,Oriented to time,Disoriented to situation  Cognition: Follows commands,Decreased attention/concentration     Functional Mobility Training:  Bed Mobility:     Supine to Sit: Moderate assistance; Additional time;Bed Modified  Sit to Supine: Moderate assistance           Transfers:  Sit to Stand: Maximum assistance (sit to partial stand)  Stand to Sit: Moderate assistance                             Balance:  Sitting: Intact  Standing: Impaired; With support (partial stand with bilat UE support)  Standing - Static: Poor;Constant support      Pain Rating:  No c/o pain    Activity Tolerance:   Good and Fair    After treatment patient left in no apparent distress:   Call bell within reach, Side rails x 3, and sitting up in bed eating breakfast    COMMUNICATION/COLLABORATION:   The patients plan of care was discussed with: Registered nurse.      Cheryl Hartley, PT   Time Calculation: 43 mins

## 2022-01-19 NOTE — PROGRESS NOTES
[facility] Adult  Hospitalist Group                                                                                          Hospitalist Progress Note  Curtis Saucedo MD  Answering service: [phone] OR [phone] from in house phone        Date of Service:  2022  NAME:  Verena Hoffman  :  1940  MRN:  [id number]      Admission Summary:   [de-identified] yo female who was discharged from hospital on 1/15 after being admitted for CHF, Anemia/AFib/MORENO. Patient was on diuretics but then held on  due to hypotension. Pt had anemia and given 1 U PRBCs on . Pt's Eliquis stopped due to concern for bleeding. Patient's diuretics were not restarted and discharged to [facility]. Patient sent back to ER due to SOB. Interval history / Subjective:   F/u A fib with RVR  Now on 3l NC  BP now stable   FOBT negative  She went off to sleep while talking to me  Assessment & Plan:     Atrial Fib with RVR  - on Coreg/Cardizem (now cardizem switched back to reduced dose)  -Appreciate Cardiology  - not on [address] due to concerns for bleeding during previous admission   -Consider pacemaker if not able to control with medications  -Consider Watchman as outpatient if not able to put the patient on anticoagulation    Acute respiratory failure with hypoxia due to Pulmonary edema  Acute on chronic systolic CHF NYHA III  - On IV bumex, switched to oral   -Continue Coreg  -daily weight  -I/O  - no ACEI/ARBs due to CKD  -Appreciate Nephrology/Cardiology    Anemia  -s/p EGD :  Esophagus: Normal mucosa entire esophagus. Small sliding hiatal hernia was noted with diaphragmatic pinch at 40 cm and Z-line at 38 cm.   Stomach:normal   Duodenum/jejunum:normal  -s/p colonoscopy :  -polyp descending colon, otherwise normal, pathology: tubular adenoma    Drowsiness/acute metabolic encephalopathy  History of left lacunar infarct/acute CVA  history of left carotid stenosis s/p CEA  -ABG unremarkable  -Noted between 2021 to 11/2021 Buspar/neurontin/abilify was added to Cymbalta. Discussed with pharmacy, will discontinue buspar and night time neurontin    CKD 4-c/w diuretics-Appreciate nephrology  Parkinson's-c/w sinemet   CAD s/p CABG/stent-c/w ASA/Plavix/Statin/BB  HTN-BP now better, restart coreg/cardizem     Multiple hospitalizations. 5 admissions since 11/202. Awaiting palliative care    PT/OT SNF    Code status: DNR   DVT prophylaxis: SCDs    Plan: Bumex switched to oral today. Because of frequent readmissions, will keep the patient in the hospital (after switching the meds to oral) today for monitoring. If remains stable, will discharge to SNF ([facility] [facility] [address] discussed with: Patient/Family, Nurse and   Anticipated Disposition: SNF/LTC  Anticipated Discharge: less than 24 hours     Hospital Problems  Date Reviewed: 1/17/2022          Codes Class Noted POA    * (Principal) Pulmonary edema ICD-10-CM: J81.1  ICD-9-CM: 694  1/16/2022 Yes        Atrial fibrillation with RVR ([facility] ICD-10-CM: I48.91  ICD-9-CM: 427.31  1/16/2022 Unknown        Acute respiratory failure with hypoxia (HCC) ICD-10-CM: J96.01  ICD-9-CM: 518.81  1/16/2022 Unknown        Acute on chronic systolic and diastolic heart failure, NYHA class 4 ([facility] ICD-10-CM: I50.43  ICD-9-CM: 428.43  1/16/2022 Unknown        CHF exacerbation ([facility] ICD-10-CM: I50.9  ICD-9-CM: 428.0  1/15/2022 Unknown        Left bundle branch block ICD-10-CM: I44.7  ICD-9-CM: 426.3  1/1/2022 Unknown                Review of Systems:   A comprehensive review of systems was negative except for that written in the HPI. Vital Signs:    Last 24hrs VS reviewed since prior progress note.  Most recent are:  Visit Vitals  /85 (BP 1 Location: Left upper arm, BP Patient Position: Sitting)   Pulse 90   Temp 98.7 °F (37.1 °C)   Resp 14   Ht 5' 5\" (1.651 m)   Wt 75 kg (165 lb 5.5 oz)   SpO2 95%   BMI 27.51 kg/m²         Intake/Output Summary (Last 24 hours) at 1/19/2022 1158  Last data filed at 1/19/2022 6499  Gross per 24 hour   Intake    Output 600 ml   Net -600 ml        Physical Examination:     I had a face to face encounter with this patient and independently examined them on 1/19/2022 as outlined below:          Constitutional:  No acute distress, cooperative, pleasant    ENT:  Oral mucosa moist, oropharynx benign. Resp:  decreased BS with rales at bases   CV:  irregularly irregular     GI:  Soft, non distended, non tender. normoactive bowel sounds,     Musculoskeletal:  No edema, warm, 2+ pulses throughout    Neurologic:  Moves all extremities. AAOx3, CN II-XII reviewed            Data Review:    Review and/or order of clinical lab test  Review and/or order of tests in the radiology section of CPT  Review and/or order of tests in the medicine section of CPT      Labs:     Recent Labs     01/19/22 0433 01/18/22  0014   WBC 4.0 4.6   HGB 7.5* 7.6*   HCT 23.3* 23.3*    204     Recent Labs     01/19/22 0433 01/18/22  0014 01/17/22  0617    136 137   K 4.2 4.1 4.0    103 104   CO2 25 26 26   BUN 52* 53* 48*   CREA 1.91* 1.97* 1.91*   GLU 94 135* 106*   CA 8.9 8.9 9.3   MG  --   --  2.1     No results for input(s): ALT, AP, TBIL, TBILI, TP, ALB, GLOB, GGT, AML, LPSE in the last 72 hours. No lab exists for component: SGOT, GPT, AMYP, HLPSE  No results for input(s): INR, PTP, APTT, INREXT, INREXT in the last 72 hours. No results for input(s): FE, TIBC, PSAT, FERR in the last 72 hours. Lab Results   Component Value Date/Time    Folate 7.8 12/14/2021 04:40 AM      No results for input(s): PH, PCO2, PO2 in the last 72 hours. No results for input(s): CPK, CKNDX, TROIQ in the last 72 hours.     No lab exists for component: CPKMB  Lab Results   Component Value Date/Time    Cholesterol, total 148 09/23/2020 04:27 AM    HDL Cholesterol 52 09/23/2020 04:27 AM    LDL, calculated 83.8 09/23/2020 04:27 AM    Triglyceride 61 09/23/2020 04:27 AM    CHOL/HDL Ratio 2.8 09/23/2020 04:27 AM     Lab Results   Component Value Date/Time    Glucose (POC) 155 (H) 01/19/2022 08:21 AM    Glucose (POC) 126 (H) 01/18/2022 09:27 PM    Glucose (POC) 169 (H) 01/18/2022 05:01 PM    Glucose (POC) 160 (H) 01/18/2022 12:11 PM    Glucose (POC) 136 (H) 01/18/2022 08:45 AM     Lab Results   Component Value Date/Time    Color YELLOW/STRAW 12/30/2021 08:23 PM    Appearance CLOUDY (A) 12/30/2021 08:23 PM    Specific gravity 1.017 12/30/2021 08:23 PM    pH (UA) 6.0 12/30/2021 08:23 PM    Protein 100 (A) 12/30/2021 08:23 PM    Glucose Negative 12/30/2021 08:23 PM    Ketone Negative 12/30/2021 08:23 PM    Bilirubin Negative 12/30/2021 08:23 PM    Urobilinogen 1.0 12/30/2021 08:23 PM    Nitrites Negative 12/30/2021 08:23 PM    Leukocyte Esterase MODERATE (A) 12/30/2021 08:23 PM    Epithelial cells FEW 12/30/2021 08:23 PM    Bacteria 1+ (A) 12/30/2021 08:23 PM    WBC 0-4 12/30/2021 08:23 PM    RBC 0-5 12/30/2021 08:23 PM         Medications Reviewed:     Current Facility-Administered Medications   Medication Dose Route Frequency    carvediloL (COREG) tablet 37.5 mg  37.5 mg Oral BID WITH MEALS    dilTIAZem IR (CARDIZEM) tablet 30 mg  30 mg Oral TIDAC    bumetanide (BUMEX) tablet 1 mg  1 mg Oral BID    insulin lispro (HUMALOG) injection   SubCUTAneous AC&HS    glucose chewable tablet 16 g  4 Tablet Oral PRN    dextrose (D50W) injection syrg 12.5-25 g  12.5-25 g IntraVENous PRN    glucagon (GLUCAGEN) injection 1 mg  1 mg IntraMUSCular PRN    ARIPiprazole (ABILIFY) tablet 20 mg  20 mg Oral DAILY    atorvastatin (LIPITOR) tablet 40 mg  40 mg Oral QHS    busPIRone (BUSPAR) tablet 5 mg  5 mg Oral TID    carbidopa-levodopa (SINEMET)  mg per tablet 2 Tablet  2 Tablet Oral QID    cholecalciferol (VITAMIN D3) (1000 Units /25 mcg) tablet 1,000 Units  1,000 Units Oral DAILY    cilostazoL (PLETAL) tablet 100 mg  100 mg Oral ACB&D    clopidogreL (PLAVIX) tablet 75 mg  75 mg Oral DAILY AFTER BREAKFAST    cyanocobalamin (VITAMIN B12) tablet 100 mcg  100 mcg Oral DAILY    DULoxetine (CYMBALTA) capsule 120 mg  120 mg Oral DAILY    gabapentin (NEURONTIN) capsule 100 mg  100 mg Oral QHS    nitroglycerin (NITROSTAT) tablet 0.4 mg  0.4 mg SubLINGual Q5MIN PRN    pantoprazole (PROTONIX) tablet 40 mg  40 mg Oral ACB    polyethylene glycol (MIRALAX) packet 17 g  17 g Oral DAILY PRN    senna-docusate (PERICOLACE) 8.6-50 mg per tablet 1 Tablet  1 Tablet Oral QHS    albuterol-ipratropium (DUO-NEB) 2.5 MG-0.5 MG/3 ML  3 mL Nebulization Q6H PRN     ______________________________________________________________________  EXPECTED LENGTH OF STAY: - - -  ACTUAL LENGTH OF STAY:          3                 Ramiro Lyons MD

## 2022-01-19 NOTE — PROGRESS NOTES
Cardiology Progress Note        Admit Date: 1/15/2022  Admit Diagnosis: CHF exacerbation ([facility]) [I50.9]  Acute respiratory failure with hypoxia ([facility]) [J96.01]  Pulmonary edema [J81.1]  Atrial fibrillation with RVR ([facility]) [I48.91]  Acute on chronic systolic and diastolic heart failure, NYHA class 4 ([facility] [I50.43]  Date: 1/19/2022     Time: 0900      Subjective: Today, remains very drowsy but awakens briefly and answers questions. Reports she feels tired, sleepy. Denies any chest pain, SOB. Her Hgb is about same as yesterday but low (7.5). Stool for OB was negative. Heart rate was low yesterday afternoon (HR 40's-50's- afib)  so Coreg and diltiazem were held. There were no significant pauses on tele review. Evidence of aflutter also noted overnight. Today, she remains in afib, HR is in 70's-80's overall. Assessment and Plan     1. Afib/aflutter:   -HR currently controlled 70's-80s (40's-50 low yesterday evening)   -Restart Coreg 37.5 mg BID with hold parameters   -Resume diltiazem IR at reduced dose of 30 mg TID   -Consider PPM/AVN if not able to control with medications   -[id number]=1. Eliquis on hold since last admission due to anemia. Consider watchman as outpatient if unable to resume Eliquis. 2. Cardiomyopathy/acute on chronic HFmrEF   -EF 40-45% (TTE 1/9/2022)   -Diuretics changed to po today.(per renal)   -Coreg, Avoid ace-I /ARB (MORENO on CKD)   -no isordil/hydralazine- low-low NL BP     3. Mild-moderate MR    4. CAD   -s/p CABG. s/p PCI LAD 4/2021. Nuc stress 1/11/22 no  ischemia or infarction    -Plavix, statin, BB    5. HTN   -bp controlled- low.   -coreg, diltiazem  7. Anemia   -unclear etiology   -Hgb 7.5, unchanged from yesterday   -Stool OB yesterday negative.    -Defer evaluation to primary team   -Eliquis on hold    8. MORENO on CKD: per renal. Creatinine stable. 9.  History of LBBB  10.  History of CVA:  on plavix, asa, statin  11. History of Carotid artery disease s/p Left CEA  12. DNR       Remains in afib/aflutter. HR low yesterday but no significant pauses and HR 70's-80's today. Resume diltiazem and coreg as above. No OAC given anemia- unclear etiology but stool OB negative. Defer anemia eval/management to primary team.   Joie Espinoza. SUAD Sue    Saw and evaluated pt and agree with above assessment and plan. Trying to control HR with meds; continue po BB/CCB. Aware of pacer, AVN ablation if she does not respond to medications. No OAC at this time, consider watchman outpt.  Diuretics per renal.   Viridiana Ruiz MD    [facility]  Past Medical History:   Diagnosis Date    Anxiety disorder     Atrial fibrillation [facility])     CAD (coronary artery disease) 2007    stents, CABG x 3v    Carotid stenosis     Cervical stenosis of spinal canal 2019    Chronic kidney disease     Cough     CVA (cerebral vascular accident) ([facility] 2019    left lacunar infarct at head of caudate    Depression     AND CHRONIC ANXIETY    Diabetes ([facility]     GERD (gastroesophageal reflux disease)     High cholesterol     History of peptic ulcer     Bleeding ulcer with increased NSAID use    Hypertension     Left bundle branch block 2022    Left carotid stenosis 2019    s/p left CEA with Dr. Natalie Bucio, old 2007    PUD (peptic ulcer disease)     Stroke ([facility]     Tremor     Valvular heart disease       Social Hx  Social History     Socioeconomic History    Marital status: SINGLE     Spouse name: Not on file    Number of children: Not on file    Years of education: Not on file    Highest education level: Not on file   Occupational History    Occupation: Retired realestate/teacher   Tobacco Use    Smoking status: Former Smoker     Packs/day: 0.25     Years: 5.00     Pack years: 1.25     Types: Cigarettes     Quit date:      Years since quittin.0    Smokeless tobacco: Never Used   Substance and Sexual Activity    Alcohol use: Not Currently     Comment: Rare    Drug use: No    Sexual activity: Not on file   Other Topics Concern    Not on file   Social History Narrative    [facility]     Social Determinants of Health     Financial Resource Strain:     Difficulty of Paying Living Expenses: Not on file   Food Insecurity:     Worried About Running Out of Food in the Last Year: Not on file    Juany of Food in the Last Year: Not on file   Transportation Needs:     Lack of Transportation (Medical): Not on file    Lack of Transportation (Non-Medical): Not on file   Physical Activity:     Days of Exercise per Week: Not on file    Minutes of Exercise per Session: Not on file   Stress:     Feeling of Stress : Not on file   Social Connections:     Frequency of Communication with Friends and Family: Not on file    Frequency of Social Gatherings with Friends and Family: Not on file    Attends Bahai Services: Not on file    Active Member of [address] [place] [address] or Organizations: Not on file    Attends Club or Organization Meetings: Not on file    Marital Status: Not on file   Intimate Partner Violence:     Fear of Current or Ex-Partner: Not on file    Emotionally Abused: Not on file    Physically Abused: Not on file    Sexually Abused: Not on file   Housing Stability:     Unable to Pay for Housing in the Last Year: Not on file    Number of [place] in the Last Year: Not on file    Unstable Housing in the Last Year: Not on file       ROS:  Pertinent items are noted in HPI. Objective:      Physical Exam:                Visit Vitals  /70 (BP 1 Location: Left upper arm, BP Patient Position: At rest)   Pulse 76   Temp 98.2 °F (36.8 °C)   Resp 14   Ht 5' 5\" (1.651 m)   Wt 75 kg (165 lb 5.5 oz)   SpO2 94%   BMI 27.51 kg/m²          General Appearance:   Well developed, drowsy, awakens to voice, answeres questions. oriented x 3, and   individual in no acute distress.    Ears/Nose/Mouth/Throat:    Hearing grossly normal.         Neck:  Supple. Chest:    Lungs diminished bases, poor deep inspiratory effort. Cardiovascular:   Irregularly irregular Regular rate and rhythm, S1, S2 normal, I/VI systolic murmur LLSB   Abdomen:    Soft, non-tender, bowel sounds are active. Extremities:   Tr left ankle edema   Skin:  Warm and dry. Telemetry: afib, rate controlled this a.m. HR with slow ventricular response yesterday but no signiicant pauses. Episodes of aflutter also noted overnight.             Data Review:    Labs:    Recent Results (from the past 24 hour(s))   GLUCOSE, POC    Collection Time: 01/18/22 12:11 PM   Result Value Ref Range    Glucose (POC) 160 (H) 65 - 117 mg/dL    Performed by Sergio DUTTON    GLUCOSE, POC    Collection Time: 01/18/22  5:01 PM   Result Value Ref Range    Glucose (POC) 169 (H) 65 - 117 mg/dL    Performed by Eric Hamlin    POC G3 - PUL    Collection Time: 01/18/22  5:20 PM   Result Value Ref Range    FIO2 (POC) 3 %    pH (POC) 7.42 7.35 - 7.45      pCO2 (POC) 40.6 35.0 - 45.0 MMHG    pO2 (POC) 62 (L) 80 - 100 MMHG    HCO3 (POC) 26.3 (H) 22 - 26 MMOL/L    sO2 (POC) 91.8 (L) 92 - 97 %    Base excess (POC) 1.6 mmol/L    Site RIGHT BRACHIAL      Device: NASAL CANNULA      Allens test (POC) Positive      Specimen type (POC) ARTERIAL     GLUCOSE, POC    Collection Time: 01/18/22  9:27 PM   Result Value Ref Range    Glucose (POC) 126 (H) 65 - 117 mg/dL    Performed by Deniz Layne    CBC WITH AUTOMATED DIFF    Collection Time: 01/19/22  4:33 AM   Result Value Ref Range    WBC 4.0 3.6 - 11.0 K/uL    RBC 2.33 (L) 3.80 - 5.20 M/uL    HGB 7.5 (L) 11.5 - 16.0 g/dL    HCT 23.3 (L) 35.0 - 47.0 %    .0 (H) 80.0 - 99.0 FL    MCH 32.2 26.0 - 34.0 PG    MCHC 32.2 30.0 - 36.5 g/dL    RDW 13.2 11.5 - 14.5 %    PLATELET 795 885 - 329 K/uL    MPV 9.5 8.9 - 12.9 FL    NRBC 0.0 0  WBC    ABSOLUTE NRBC 0.00 0.00 - 0.01 K/uL    NEUTROPHILS 62 32 - 75 %    LYMPHOCYTES 19 12 - 49 % MONOCYTES 12 5 - 13 %    EOSINOPHILS 6 0 - 7 %    BASOPHILS 1 0 - 1 %    IMMATURE GRANULOCYTES 0 0.0 - 0.5 %    ABS. NEUTROPHILS 2.5 1.8 - 8.0 K/UL    ABS. LYMPHOCYTES 0.8 0.8 - 3.5 K/UL    ABS. MONOCYTES 0.5 0.0 - 1.0 K/UL    ABS. EOSINOPHILS 0.2 0.0 - 0.4 K/UL    ABS. BASOPHILS 0.0 0.0 - 0.1 K/UL    ABS. IMM.  GRANS. 0.0 0.00 - 0.04 K/UL    DF SMEAR SCANNED      RBC COMMENTS MACROCYTOSIS  1+        RBC COMMENTS POLYCHROMASIA  PRESENT       METABOLIC PANEL, BASIC    Collection Time: 01/19/22  4:33 AM   Result Value Ref Range    Sodium 136 136 - 145 mmol/L    Potassium 4.2 3.5 - 5.1 mmol/L    Chloride 104 97 - 108 mmol/L    CO2 25 21 - 32 mmol/L    Anion gap 7 5 - 15 mmol/L    Glucose 94 65 - 100 mg/dL    BUN 52 (H) 6 - 20 MG/DL    Creatinine 1.91 (H) 0.55 - 1.02 MG/DL    BUN/Creatinine ratio 27 (H) 12 - 20      GFR est AA 31 (L) >60 ml/min/1.73m2    GFR est non-AA 25 (L) >60 ml/min/1.73m2    Calcium 8.9 8.5 - 10.1 MG/DL   GLUCOSE, POC    Collection Time: 01/19/22  8:21 AM   Result Value Ref Range    Glucose (POC) 155 (H) 65 - 117 mg/dL    Performed by Parth Eckert           Radiology:        Current Facility-Administered Medications   Medication Dose Route Frequency    carvediloL (COREG) tablet 37.5 mg  37.5 mg Oral BID WITH MEALS    dilTIAZem IR (CARDIZEM) tablet 30 mg  30 mg Oral TIDAC    bumetanide (BUMEX) tablet 1 mg  1 mg Oral BID    insulin lispro (HUMALOG) injection   SubCUTAneous AC&HS    glucose chewable tablet 16 g  4 Tablet Oral PRN    dextrose (D50W) injection syrg 12.5-25 g  12.5-25 g IntraVENous PRN    glucagon (GLUCAGEN) injection 1 mg  1 mg IntraMUSCular PRN    ARIPiprazole (ABILIFY) tablet 20 mg  20 mg Oral DAILY    atorvastatin (LIPITOR) tablet 40 mg  40 mg Oral QHS    busPIRone (BUSPAR) tablet 5 mg  5 mg Oral TID    carbidopa-levodopa (SINEMET)  mg per tablet 2 Tablet  2 Tablet Oral QID    cholecalciferol (VITAMIN D3) (1000 Units /25 mcg) tablet 1,000 Units  1,000 Units Oral DAILY    cilostazoL (PLETAL) tablet 100 mg  100 mg Oral ACB&D    clopidogreL (PLAVIX) tablet 75 mg  75 mg Oral DAILY AFTER BREAKFAST    cyanocobalamin (VITAMIN B12) tablet 100 mcg  100 mcg Oral DAILY    DULoxetine (CYMBALTA) capsule 120 mg  120 mg Oral DAILY    gabapentin (NEURONTIN) capsule 100 mg  100 mg Oral QHS    nitroglycerin (NITROSTAT) tablet 0.4 mg  0.4 mg SubLINGual Q5MIN PRN    pantoprazole (PROTONIX) tablet 40 mg  40 mg Oral ACB    polyethylene glycol (MIRALAX) packet 17 g  17 g Oral DAILY PRN    senna-docusate (PERICOLACE) 8.6-50 mg per tablet 1 Tablet  1 Tablet Oral QHS    albuterol-ipratropium (DUO-NEB) 2.5 MG-0.5 MG/3 ML  3 mL Nebulization Q6H PRN          Mirella Henriquez.  SUAD Sue     [facility] [address] [place] [address],[address]   [address]   [phone]

## 2022-01-19 NOTE — PROGRESS NOTES
Bedside shift change report given to López Vargas RN (oncoming nurse) by Deisy Santamaria RN (offgoing nurse). Report included the following information SBAR, Kardex, ED Summary, Intake/Output, MAR, Accordion, Recent Results, Med Rec Status, Cardiac Rhythm Afib rate controlled/ham and Alarm Parameters .

## 2022-01-19 NOTE — PROGRESS NOTES
Chart reviewed and attempted to see patient for OT intervention. Patient eating lunch at this time. Will follow up for OT intervention as able. Thank you.

## 2022-01-19 NOTE — PROGRESS NOTES
Bedside shift change report given to Abner Terrazas (oncoming nurse) by Anup Thorpe RN (offgoing nurse). Report included the following information SBAR, Kardex, ED Summary, Intake/Output, MAR, Recent Results and Cardiac Rhythm a.fib bradycardic.

## 2022-01-20 ENCOUNTER — APPOINTMENT (OUTPATIENT)
Dept: GENERAL RADIOLOGY | Age: 82
DRG: 291 | End: 2022-01-20
Attending: HOSPITALIST
Payer: MEDICARE

## 2022-01-20 LAB
ANION GAP SERPL CALC-SCNC: 5 MMOL/L (ref 5–15)
BASOPHILS # BLD: 0 K/UL (ref 0–0.1)
BASOPHILS NFR BLD: 1 % (ref 0–1)
BUN SERPL-MCNC: 52 MG/DL (ref 6–20)
BUN/CREAT SERPL: 32 (ref 12–20)
CALCIUM SERPL-MCNC: 8.7 MG/DL (ref 8.5–10.1)
CHLORIDE SERPL-SCNC: 106 MMOL/L (ref 97–108)
CO2 SERPL-SCNC: 27 MMOL/L (ref 21–32)
COVID-19 RAPID TEST, COVR: DETECTED
CREAT SERPL-MCNC: 1.65 MG/DL (ref 0.55–1.02)
DIFFERENTIAL METHOD BLD: ABNORMAL
EOSINOPHIL # BLD: 0.3 K/UL (ref 0–0.4)
EOSINOPHIL NFR BLD: 6 % (ref 0–7)
ERYTHROCYTE [DISTWIDTH] IN BLOOD BY AUTOMATED COUNT: 13.2 % (ref 11.5–14.5)
GLUCOSE BLD STRIP.AUTO-MCNC: 129 MG/DL (ref 65–117)
GLUCOSE BLD STRIP.AUTO-MCNC: 131 MG/DL (ref 65–117)
GLUCOSE BLD STRIP.AUTO-MCNC: 178 MG/DL (ref 65–117)
GLUCOSE BLD STRIP.AUTO-MCNC: 99 MG/DL (ref 65–117)
GLUCOSE SERPL-MCNC: 91 MG/DL (ref 65–100)
HCT VFR BLD AUTO: 23.2 % (ref 35–47)
HGB BLD-MCNC: 7.4 G/DL (ref 11.5–16)
IMM GRANULOCYTES # BLD AUTO: 0 K/UL (ref 0–0.04)
IMM GRANULOCYTES NFR BLD AUTO: 0 % (ref 0–0.5)
LYMPHOCYTES # BLD: 0.8 K/UL (ref 0.8–3.5)
LYMPHOCYTES NFR BLD: 21 % (ref 12–49)
MCH RBC QN AUTO: 32.2 PG (ref 26–34)
MCHC RBC AUTO-ENTMCNC: 31.9 G/DL (ref 30–36.5)
MCV RBC AUTO: 100.9 FL (ref 80–99)
MONOCYTES # BLD: 0.5 K/UL (ref 0–1)
MONOCYTES NFR BLD: 11 % (ref 5–13)
NEUTS SEG # BLD: 2.5 K/UL (ref 1.8–8)
NEUTS SEG NFR BLD: 61 % (ref 32–75)
NRBC # BLD: 0 K/UL (ref 0–0.01)
NRBC BLD-RTO: 0 PER 100 WBC
PLATELET # BLD AUTO: 202 K/UL (ref 150–400)
PMV BLD AUTO: 9 FL (ref 8.9–12.9)
POTASSIUM SERPL-SCNC: 3.7 MMOL/L (ref 3.5–5.1)
RBC # BLD AUTO: 2.3 M/UL (ref 3.8–5.2)
SERVICE CMNT-IMP: ABNORMAL
SERVICE CMNT-IMP: NORMAL
SODIUM SERPL-SCNC: 138 MMOL/L (ref 136–145)
SOURCE, COVRS: ABNORMAL
WBC # BLD AUTO: 4.1 K/UL (ref 3.6–11)

## 2022-01-20 PROCEDURE — 85025 COMPLETE CBC W/AUTO DIFF WBC: CPT

## 2022-01-20 PROCEDURE — 99232 SBSQ HOSP IP/OBS MODERATE 35: CPT | Performed by: INTERNAL MEDICINE

## 2022-01-20 PROCEDURE — 80048 BASIC METABOLIC PNL TOTAL CA: CPT

## 2022-01-20 PROCEDURE — 65660000001 HC RM ICU INTERMED STEPDOWN

## 2022-01-20 PROCEDURE — 74011250637 HC RX REV CODE- 250/637: Performed by: HOSPITALIST

## 2022-01-20 PROCEDURE — 36415 COLL VENOUS BLD VENIPUNCTURE: CPT

## 2022-01-20 PROCEDURE — 87635 SARS-COV-2 COVID-19 AMP PRB: CPT

## 2022-01-20 PROCEDURE — 71045 X-RAY EXAM CHEST 1 VIEW: CPT

## 2022-01-20 PROCEDURE — 74011636637 HC RX REV CODE- 636/637: Performed by: HOSPITALIST

## 2022-01-20 PROCEDURE — 77010033678 HC OXYGEN DAILY

## 2022-01-20 PROCEDURE — 94760 N-INVAS EAR/PLS OXIMETRY 1: CPT

## 2022-01-20 PROCEDURE — P9047 ALBUMIN (HUMAN), 25%, 50ML: HCPCS | Performed by: NURSE PRACTITIONER

## 2022-01-20 PROCEDURE — 74011250637 HC RX REV CODE- 250/637: Performed by: NURSE PRACTITIONER

## 2022-01-20 PROCEDURE — 74011250636 HC RX REV CODE- 250/636: Performed by: NURSE PRACTITIONER

## 2022-01-20 PROCEDURE — 82962 GLUCOSE BLOOD TEST: CPT

## 2022-01-20 RX ORDER — GUAIFENESIN 600 MG/1
600 TABLET, EXTENDED RELEASE ORAL EVERY 12 HOURS
Status: DISCONTINUED | OUTPATIENT
Start: 2022-01-20 | End: 2022-01-27 | Stop reason: HOSPADM

## 2022-01-20 RX ORDER — ALBUMIN HUMAN 250 G/1000ML
12.5 SOLUTION INTRAVENOUS EVERY 6 HOURS
Status: DISCONTINUED | OUTPATIENT
Start: 2022-01-20 | End: 2022-01-20

## 2022-01-20 RX ORDER — CARVEDILOL 12.5 MG/1
12.5 TABLET ORAL 2 TIMES DAILY WITH MEALS
Status: DISCONTINUED | OUTPATIENT
Start: 2022-01-20 | End: 2022-01-27 | Stop reason: HOSPADM

## 2022-01-20 RX ADMIN — BUMETANIDE 1 MG: 1 TABLET ORAL at 17:50

## 2022-01-20 RX ADMIN — Medication 1000 UNITS: at 10:53

## 2022-01-20 RX ADMIN — DULOXETINE 120 MG: 60 CAPSULE, DELAYED RELEASE ORAL at 10:53

## 2022-01-20 RX ADMIN — GUAIFENESIN 600 MG: 600 TABLET, EXTENDED RELEASE ORAL at 17:50

## 2022-01-20 RX ADMIN — GUAIFENESIN 600 MG: 600 TABLET, EXTENDED RELEASE ORAL at 20:48

## 2022-01-20 RX ADMIN — ARIPIPRAZOLE 20 MG: 5 TABLET ORAL at 10:53

## 2022-01-20 RX ADMIN — CARVEDILOL 12.5 MG: 12.5 TABLET, FILM COATED ORAL at 15:11

## 2022-01-20 RX ADMIN — BUMETANIDE 1 MG: 1 TABLET ORAL at 10:53

## 2022-01-20 RX ADMIN — Medication 2 UNITS: at 17:50

## 2022-01-20 RX ADMIN — DILTIAZEM HYDROCHLORIDE 30 MG: 30 TABLET, FILM COATED ORAL at 07:33

## 2022-01-20 RX ADMIN — CLOPIDOGREL 75 MG: 75 TABLET, FILM COATED ORAL at 10:53

## 2022-01-20 RX ADMIN — DILTIAZEM HYDROCHLORIDE 30 MG: 30 TABLET, FILM COATED ORAL at 10:53

## 2022-01-20 RX ADMIN — CARBIDOPA AND LEVODOPA 2 TABLET: 25; 100 TABLET ORAL at 12:21

## 2022-01-20 RX ADMIN — PANTOPRAZOLE SODIUM 40 MG: 40 TABLET, DELAYED RELEASE ORAL at 07:33

## 2022-01-20 RX ADMIN — CILOSTAZOL 100 MG: 50 TABLET ORAL at 07:33

## 2022-01-20 RX ADMIN — ATORVASTATIN CALCIUM 40 MG: 40 TABLET, FILM COATED ORAL at 20:48

## 2022-01-20 RX ADMIN — DILTIAZEM HYDROCHLORIDE 30 MG: 30 TABLET, FILM COATED ORAL at 15:12

## 2022-01-20 RX ADMIN — ALBUMIN (HUMAN) 12.5 G: 0.25 INJECTION, SOLUTION INTRAVENOUS at 02:34

## 2022-01-20 RX ADMIN — CARBIDOPA AND LEVODOPA 2 TABLET: 25; 100 TABLET ORAL at 17:50

## 2022-01-20 RX ADMIN — CILOSTAZOL 100 MG: 50 TABLET ORAL at 15:12

## 2022-01-20 RX ADMIN — CARBIDOPA AND LEVODOPA 2 TABLET: 25; 100 TABLET ORAL at 21:00

## 2022-01-20 RX ADMIN — VITAM B12 100 MCG: 100 TAB at 10:53

## 2022-01-20 RX ADMIN — CARBIDOPA AND LEVODOPA 2 TABLET: 25; 100 TABLET ORAL at 10:53

## 2022-01-20 NOTE — PROGRESS NOTES
2000 Verbal bedside update given to Mario Fry returning nurse by Dano Martinez RN off-going nurse. Report included update on pt status and condition, recent results,heart rate and rhythm, respiratory status and MAR.

## 2022-01-20 NOTE — PROGRESS NOTES
Nephrology Progress Note  Jeevan Samayoa  Date of Admission : 1/15/2022    CC:  Follow up for MORENO on CKD       Assessment and Plan     MORENO on CKD:  - diff: progressive disease vs CRS  - Cr stable, volume better  - cont oral diuretics  - daily labs     Afib with RVR  - per cards     CKD IV:  - baseline increasing, likely around 1.6 to 1.8 now     HTN:  - BP stable     Encephalopathy:  - w/u per primary team    Acute on Chronic HFpEF  CAD, hx of CABG  - ECHO w/ EF 55-60%  - rapid COVID neg  - diuretics as above         Parkinsons  Hx of CVA  PAD w/ L CEA  AAA       Interval History:  Seen and examined. Pt awake, eating breakfast.  Voiding well. Cr better today. No cp, sob, n/v/d  Current Medications: all current  Medications have been eviewed in EPIC  Review of Systems: Pertinent items are noted in HPI. Objective:  Vitals:    Vitals:    01/20/22 0733 01/20/22 1000 01/20/22 1102 01/20/22 1200   BP: (!) 126/54  132/61 (!) 135/44   Pulse: 77 71 71 72   Resp: 13  17 18   Temp: 97.6 °F (36.4 °C)  97.8 °F (36.6 °C)    SpO2: 100%  98% 93%   Weight:       Height:         Intake and Output:  01/20 0701 - 01/20 1900  In: 350 [P.O.:350]  Out: 1350 [Urine:1350]  01/18 1901 - 01/20 0700  In: -   Out: 600 [Urine:600]    Physical Examination:  General: Confused, lethargic  Neck:  Supple, no mass  Resp:  Lungs CTA B/L, no wheezing , normal respiratory effort  CV:  RRR,  no murmur or rub, trace LE edema  GI:  Soft, NT, + Bowel sounds, no hepatosplenomegaly  Neurologic:  confused  Psych:             Unable to assess   Skin:  No Rash  :  No pittman    []    High complexity decision making was performed  []    Patient is at high-risk of decompensation with multiple organ involvement    Lab Data Personally Reviewed: I have reviewed all the pertinent labs, microbiology data and radiology studies during assessment.     Recent Labs     01/20/22  0648 01/19/22  0433 01/18/22  0014    136 136   K 3.7 4.2 4.1    104 103 CO2 27 25 26   GLU 91 94 135*   BUN 52* 52* 53*   CREA 1.65* 1.91* 1.97*   CA 8.7 8.9 8.9     Recent Labs     01/20/22  0648 01/19/22  0433 01/18/22  0014   WBC 4.1 4.0 4.6   HGB 7.4* 7.5* 7.6*   HCT 23.2* 23.3* 23.3*    209 204     No results found for: SDES  Lab Results   Component Value Date/Time    Culture result: NO GROWTH 5 DAYS 01/07/2022 08:37 PM    Culture result: MRSA NOT PRESENT 12/10/2021 06:53 AM    Culture result:  12/10/2021 06:53 AM     Screening of patient nares for MRSA is for surveillance purposes and, if positive, to facilitate isolation considerations in high risk settings. It is not intended for automatic decolonization interventions per se as regimens are not sufficiently effective to warrant routine use.      Recent Results (from the past 24 hour(s))   GLUCOSE, POC    Collection Time: 01/19/22 12:39 PM   Result Value Ref Range    Glucose (POC) 127 (H) 65 - 117 mg/dL    Performed by Sergio DUTTON    GLUCOSE, POC    Collection Time: 01/19/22  4:10 PM   Result Value Ref Range    Glucose (POC) 156 (H) 65 - 117 mg/dL    Performed by Bita Salcedo    GLUCOSE, POC    Collection Time: 01/19/22  5:14 PM   Result Value Ref Range    Glucose (POC) 124 (H) 65 - 117 mg/dL    Performed by Wai Celeste    GLUCOSE, POC    Collection Time: 01/19/22  9:06 PM   Result Value Ref Range    Glucose (POC) 154 (H) 65 - 117 mg/dL    Performed by Jessika Lowe    CBC WITH AUTOMATED DIFF    Collection Time: 01/20/22  6:48 AM   Result Value Ref Range    WBC 4.1 3.6 - 11.0 K/uL    RBC 2.30 (L) 3.80 - 5.20 M/uL    HGB 7.4 (L) 11.5 - 16.0 g/dL    HCT 23.2 (L) 35.0 - 47.0 %    .9 (H) 80.0 - 99.0 FL    MCH 32.2 26.0 - 34.0 PG    MCHC 31.9 30.0 - 36.5 g/dL    RDW 13.2 11.5 - 14.5 %    PLATELET 591 663 - 640 K/uL    MPV 9.0 8.9 - 12.9 FL    NRBC 0.0 0  WBC    ABSOLUTE NRBC 0.00 0.00 - 0.01 K/uL    NEUTROPHILS 61 32 - 75 %    LYMPHOCYTES 21 12 - 49 %    MONOCYTES 11 5 - 13 % EOSINOPHILS 6 0 - 7 %    BASOPHILS 1 0 - 1 %    IMMATURE GRANULOCYTES 0 0.0 - 0.5 %    ABS. NEUTROPHILS 2.5 1.8 - 8.0 K/UL    ABS. LYMPHOCYTES 0.8 0.8 - 3.5 K/UL    ABS. MONOCYTES 0.5 0.0 - 1.0 K/UL    ABS. EOSINOPHILS 0.3 0.0 - 0.4 K/UL    ABS. BASOPHILS 0.0 0.0 - 0.1 K/UL    ABS. IMM. GRANS. 0.0 0.00 - 0.04 K/UL    DF AUTOMATED     METABOLIC PANEL, BASIC    Collection Time: 01/20/22  6:48 AM   Result Value Ref Range    Sodium 138 136 - 145 mmol/L    Potassium 3.7 3.5 - 5.1 mmol/L    Chloride 106 97 - 108 mmol/L    CO2 27 21 - 32 mmol/L    Anion gap 5 5 - 15 mmol/L    Glucose 91 65 - 100 mg/dL    BUN 52 (H) 6 - 20 MG/DL    Creatinine 1.65 (H) 0.55 - 1.02 MG/DL    BUN/Creatinine ratio 32 (H) 12 - 20      GFR est AA 36 (L) >60 ml/min/1.73m2    GFR est non-AA 30 (L) >60 ml/min/1.73m2    Calcium 8.7 8.5 - 10.1 MG/DL   GLUCOSE, POC    Collection Time: 01/20/22  8:39 AM   Result Value Ref Range    Glucose (POC) 99 65 - 117 mg/dL    Performed by [address] [place] [address], POC    Collection Time: 01/20/22 11:18 AM   Result Value Ref Range    Glucose (POC) 129 (H) 65 - 117 mg/dL    Performed by Chetan Epstein MD  [address]  Phone - [phone]   Fax - [phone]  [email or web address]

## 2022-01-20 NOTE — PROGRESS NOTES
Physician Progress Note      PATIENT:               Danish Johnson  CSN #:                  [id number]  :                       1940  ADMIT DATE:       1/15/2022 9:35 AM  DISCH DATE:  RESPONDING  PROVIDER #:        BUFFY RITTER MD          QUERY TEXT:    Patient admitted with HF exacerbation. Noted documentation of systolic HF listed in treatment plan of multiple notes and systolic and diastolic listed on the active problem list and getting auto populated into notes. If possible, please document in progress notes, discharge summary, and active problem list if you are evaluating and /or treating any of the following: The medical record reflects the following:  Risk Factors: 80yo with CHF, HTN, CKD    Clinical Indicators: Active problem list  Acute on chronic systolic and diastolic heart failure, NYHA class 4 (HCC) ICD-10-CM: I50.43   2022 - Present    H&P  Acute on chronic systolic congestive heart failure NYHA4    Cardiology  Admit Diagnosis: CHF exacerbation ([facility] [I50.9]  Acute respiratory failure with hypoxia ([facility] [J96.01]  Pulmonary edema [J81.1]  Atrial fibrillation with RVR ([facility]) [I48.91]  Acute on chronic systolic and diastolic heart failure, NYHA class 4 ([facility]) [I50.43]    Cardiomyopathy/acute on chronic HFmrEF  -EF 40-45% (TTE 2022)  -Diuretics changed to po today.(per renal)  -Coreg, Avoid ace-I /ARB (MORENO on CKD)  -no isordil/hydralazine- low-low NL BP    Treatment: cardiology following, diuretics, Coreg, daily labs, VS per unit routine    Thank you,  Jarod Wilde  [phone]  Options provided:  -- Acute on Chronic Systolic CHF/HFrEF  -- Acute on Chronic Systolic and Diastolic CHF  -- Other - I will add my own diagnosis  -- Disagree - Not applicable / Not valid  -- Disagree - Clinically unable to determine / Unknown  -- Refer to Clinical Documentation Reviewer    PROVIDER RESPONSE TEXT:    This patient is in acute on chronic systolic CHF/HFrEF.     Query created by: Monik Chairez on 1/20/2022 9:18 AM      Electronically signed by:  Saritha RITTER MD 1/20/2022 2:36 PM

## 2022-01-20 NOTE — PROGRESS NOTES
Spiritual Care Assessment/Progress Note  [facility]      NAME: Juan David Zaidi      MRN: [id number]  AGE: [de-identified] y.o. SEX: female  Spiritism Affiliation: Scientology   Language: English     1/20/2022     Total Time (in minutes): 13     Spiritual Assessment begun in [facility] IMCU through conversation with:         []Patient        [] Family    [] Friend(s)        Reason for Consult: Initial visit     Spiritual beliefs: (Please include comment if needed)     [] Identifies with a avtar tradition:         [] Supported by a avtar community:            [] Claims no spiritual orientation:           [] Seeking spiritual identity:                [] Adheres to an individual form of spirituality:           [x] Not able to assess:                           Identified resources for coping:      [] Prayer                               [] Music                  [] Guided Imagery     [] Family/friends                 [] Pet visits     [] Devotional reading                         [x] Unknown     [] Other:                                               Interventions offered during this visit: (See comments for more details)    Patient Interventions: Initial visit           Plan of Care:     [x] Support spiritual and/or cultural needs    [] Support AMD and/or advance care planning process      [] Support grieving process   [] Coordinate Rites and/or Rituals    [] Coordination with community clergy   [] No spiritual needs identified at this time   [] Detailed Plan of Care below (See Comments)  [] Make referral to Music Therapy  [] Make referral to Pet Therapy     [] Make referral to Addiction services  [] Make referral to [facility]  [] Make referral to Spiritual Care Partner  [] No future visits requested        [] Contact Spiritual Care for further referrals     Comments: Visit for spiritual assessment in Room 404. Patient appeared to be sleeping. Provided ministry of presence and silent prayer.  Please contact Spiritual Care for any further referrals. Visited by: Charlotte Mathews.  Liane Suazo, [address] [place] [address] paging Service [phone]

## 2022-01-20 NOTE — PROGRESS NOTES
0145: Notified by RN SBP 80's. RN states patient responded well to Albumin x 1 yesterday.    Will order another dose now.   0325: /50

## 2022-01-20 NOTE — PROGRESS NOTES
Verbal bedside report given to Ever Mayes RN oncoming nurse by Jorge Zhao. Jaylin Clifford RN off-going nurse. Report included current pt status and condition, recent results, hx of present illness, heart rate and rhythm, and respiratory status. 1100  Pt awake and alert, able to feed self.

## 2022-01-20 NOTE — PROGRESS NOTES
Transition Plan of Care  RUR 33%-High  Disposition-plan to discharge to [facility]. Attending would like to keep one more day. Updated to admissions at the facility via allscripts. Also cancelled [facility] transport for today.

## 2022-01-20 NOTE — PROGRESS NOTES
[facility]  Hospitalist Group                                                                                          Hospitalist Progress Note  Taco Boswell MD  Answering service: [phone] OR [phone] from in house phone        Date of Service:  2022  NAME:  Britni Collado  :  1940  MRN:  [id number]      Admission Summary:   [de-identified] yo female who was discharged from hospital on 1/15 after being admitted for CHF, Anemia/AFib/MORENO. Patient was on diuretics but then held on  due to hypotension. Pt had anemia and given 1 U PRBCs on . Pt's Eliquis stopped due to concern for bleeding. Patient's diuretics were not restarted and discharged to [facility]. Patient sent back to ER due to SOB. Interval history / Subjective:   Hypotensive episode SBP down  To 80's last night      Assessment & Plan:     Atrial Fib with RVR  - on Coreg and restarted cardizem with reduced dose    -Appreciate Cardiology  - not on [facility] due to concerns for bleeding during previous admission   -Consider pacemaker if not able to control with medications  -Consider Watch[facility] as outpatient if not able to put the patient on anticoagulation  -BP low last night, so decreased coreg to 12.5mg bid with holding parameter, continue cardizem 30mg tid     Acute respiratory failure with hypoxia due to Pulmonary edema  Acute on chronic systolic CHF NYHA III  - On IV bumex, switched to oral   -Continue Coreg  -daily weight  -I/O  - no ACEI/ARBs due to CKD  -Appreciate Nephrology/Cardiology    Anemia  -s/p EGD :  Esophagus: Normal mucosa entire esophagus. Small sliding hiatal hernia was noted with diaphragmatic pinch at 40 cm and Z-line at 38 cm.   Stomach:normal   Duodenum/jejunum:normal  -s/p colonoscopy :  -polyp descending colon, otherwise normal, pathology: tubular adenoma    Drowsiness/acute metabolic encephalopathy  History of left lacunar infarct/acute CVA  history of left carotid stenosis s/p CEA  -ABG unremarkable  -Noted between 04/2021 to 11/2021 Buspar/neurontin/abilify was added to Cymbalta. Discussed with pharmacy, will discontinue buspar and night time neurontin  -resolved     CKD 4-c/w diuretics-Appreciate nephrology  Parkinson's-c/w sinemet   CAD s/p CABG/stent-c/w ASA/Plavix/Statin/BB  HTN-BP now better, restart coreg/cardizem     Multiple hospitalizations. 5 admissions since 11/202. Awaiting palliative care    ADDNEDUM: SCREEN COVID +, was negative on 1/14, no symptoms from covid,  Will check procalcitonin and crp. But no treatment for this for now     PT/OT SNF    Code status: DNR   DVT prophylaxis: SCDs    Plan: coreg dose decreased. Because of frequent readmissions, will keep the patient in the hospital today for monitoring. If remains stable, will discharge to SNF ([address]) tomorrow   I discussed with pt's niece, Garret Beltran, due to her frequent readmission, Garret Beltran is interested of hospice; which she will discuss with the staff in SNF to enroll to hospice in the near future.       Care Plan discussed with: Patient/Family, Nurse and   Anticipated Disposition: SNF/LTC  Anticipated Discharge: dc to SNF tomorrow if BP stable      Hospital Problems  Date Reviewed: 1/17/2022          Codes Class Noted POA    * (Principal) Pulmonary edema ICD-10-CM: J81.1  ICD-9-CM: 102  1/16/2022 Yes        [facility] ICD-10-CM: I48.91  ICD-9-CM: 427.31  1/16/2022 Unknown        Acute respiratory failure with hypoxia (HCC) ICD-10-CM: J96.01  ICD-9-CM: 518.81  1/16/2022 Unknown        Acute on chronic systolic and diastolic heart failure, NYHA class 4 (HCC) ICD-10-CM: I50.43  ICD-9-CM: 428.43  1/16/2022 Unknown        CHF exacerbation ([facility] ICD-10-CM: I50.9  ICD-9-CM: 428.0  1/15/2022 Unknown        Left bundle branch block ICD-10-CM: I44.7  ICD-9-CM: 426.3  1/1/2022 Unknown                Review of Systems:   A comprehensive review of systems was negative except for that written in the HPI. Vital Signs:    Last 24hrs VS reviewed since prior progress note. Most recent are:  Visit Vitals  BP (!) 135/44   Pulse 72   Temp 97.8 °F (36.6 °C)   Resp 18   Ht 5' 5\" (1.651 m)   Wt 75 kg (165 lb 5.5 oz)   SpO2 93%   BMI 27.51 kg/m²         Intake/Output Summary (Last 24 hours) at 1/20/2022 1238  Last data filed at 1/20/2022 1200  Gross per 24 hour   Intake 350 ml   Output 1350 ml   Net -1000 ml        Physical Examination:     I had a face to face encounter with this patient and independently examined them on 1/20/2022 as outlined below:          Constitutional:  No acute distress, cooperative, pleasant    ENT:  Oral mucosa moist, oropharynx benign. Resp:  decreased BS with rales at bases   CV:  irregularly irregular     GI:  Soft, non distended, non tender. normoactive bowel sounds,     Musculoskeletal:  No edema, warm, 2+ pulses throughout    Neurologic:  Moves all extremities. AAOx3, CN II-XII reviewed            Data Review:    Review and/or order of clinical lab test  Review and/or order of tests in the radiology section of CPT  Review and/or order of tests in the medicine section of CPT      Labs:     Recent Labs     01/20/22  0648 01/19/22  0433   WBC 4.1 4.0   HGB 7.4* 7.5*   HCT 23.2* 23.3*    209     Recent Labs     01/20/22  0648 01/19/22  0433 01/18/22  0014    136 136   K 3.7 4.2 4.1    104 103   CO2 27 25 26   BUN 52* 52* 53*   CREA 1.65* 1.91* 1.97*   GLU 91 94 135*   CA 8.7 8.9 8.9     No results for input(s): ALT, AP, TBIL, TBILI, TP, ALB, GLOB, GGT, AML, LPSE in the last 72 hours. No lab exists for component: SGOT, GPT, AMYP, HLPSE  No results for input(s): INR, PTP, APTT, INREXT, INREXT in the last 72 hours. No results for input(s): FE, TIBC, PSAT, FERR in the last 72 hours. Lab Results   Component Value Date/Time    Folate 7.8 12/14/2021 04:40 AM      No results for input(s): PH, PCO2, PO2 in the last 72 hours.   No results for input(s): CPK, CKNDX, TROIQ in the last 72 hours.     No lab exists for component: CPKMB  Lab Results   Component Value Date/Time    Cholesterol, total 148 09/23/2020 04:27 AM    HDL Cholesterol 52 09/23/2020 04:27 AM    LDL, calculated 83.8 09/23/2020 04:27 AM    Triglyceride 61 09/23/2020 04:27 AM    CHOL/HDL Ratio 2.8 09/23/2020 04:27 AM     Lab Results   Component Value Date/Time    Glucose (POC) 129 (H) 01/20/2022 11:18 AM    Glucose (POC) 99 01/20/2022 08:39 AM    Glucose (POC) 154 (H) 01/19/2022 09:06 PM    Glucose (POC) 124 (H) 01/19/2022 05:14 PM    Glucose (POC) 156 (H) 01/19/2022 04:10 PM     Lab Results   Component Value Date/Time    Color YELLOW/STRAW 12/30/2021 08:23 PM    Appearance CLOUDY (A) 12/30/2021 08:23 PM    Specific gravity 1.017 12/30/2021 08:23 PM    pH (UA) 6.0 12/30/2021 08:23 PM    Protein 100 (A) 12/30/2021 08:23 PM    Glucose Negative 12/30/2021 08:23 PM    Ketone Negative 12/30/2021 08:23 PM    Bilirubin Negative 12/30/2021 08:23 PM    Urobilinogen 1.0 12/30/2021 08:23 PM    Nitrites Negative 12/30/2021 08:23 PM    Leukocyte Esterase MODERATE (A) 12/30/2021 08:23 PM    Epithelial cells FEW 12/30/2021 08:23 PM    Bacteria 1+ (A) 12/30/2021 08:23 PM    WBC 0-4 12/30/2021 08:23 PM    RBC 0-5 12/30/2021 08:23 PM         Medications Reviewed:     Current Facility-Administered Medications   Medication Dose Route Frequency    carvediloL (COREG) tablet 12.5 mg  12.5 mg Oral BID WITH MEALS    dilTIAZem IR (CARDIZEM) tablet 30 mg  30 mg Oral TIDAC    bumetanide (BUMEX) tablet 1 mg  1 mg Oral BID    busPIRone (BUSPAR) tablet 5 mg  5 mg Oral TID PRN    insulin lispro (HUMALOG) injection   SubCUTAneous AC&HS    glucose chewable tablet 16 g  4 Tablet Oral PRN    dextrose (D50W) injection syrg 12.5-25 g  12.5-25 g IntraVENous PRN    glucagon (GLUCAGEN) injection 1 mg  1 mg IntraMUSCular PRN    ARIPiprazole (ABILIFY) tablet 20 mg  20 mg Oral DAILY    atorvastatin (LIPITOR) tablet 40 mg  40 mg Oral QHS    carbidopa-levodopa (SINEMET)  mg per tablet 2 Tablet  2 Tablet Oral QID    cholecalciferol (VITAMIN D3) (1000 Units /25 mcg) tablet 1,000 Units  1,000 Units Oral DAILY    cilostazoL (PLETAL) tablet 100 mg  100 mg Oral ACB&D    clopidogreL (PLAVIX) tablet 75 mg  75 mg Oral DAILY AFTER BREAKFAST    cyanocobalamin (VITAMIN B12) tablet 100 mcg  100 mcg Oral DAILY    DULoxetine (CYMBALTA) capsule 120 mg  120 mg Oral DAILY    nitroglycerin (NITROSTAT) tablet 0.4 mg  0.4 mg SubLINGual Q5MIN PRN    pantoprazole (PROTONIX) tablet 40 mg  40 mg Oral ACB    polyethylene glycol (MIRALAX) packet 17 g  17 g Oral DAILY PRN    senna-docusate (PERICOLACE) 8.6-50 mg per tablet 1 Tablet  1 Tablet Oral QHS    albuterol-ipratropium (DUO-NEB) 2.5 MG-0.5 MG/3 ML  3 mL Nebulization Q6H PRN     ______________________________________________________________________  EXPECTED LENGTH OF STAY: 3d 19h  ACTUAL LENGTH OF STAY:          4                 Ana Rosa Hernandez MD

## 2022-01-20 NOTE — PROGRESS NOTES
Cardiology Progress Note        Admit Date: 1/15/2022  Admit Diagnosis: CHF exacerbation ([facility]) [I50.9]  Acute respiratory failure with hypoxia ([facility]) [J96.01]  Pulmonary edema [J81.1]  Atrial fibrillation with RVR ([facility]) [I48.91]  Acute on chronic systolic and diastolic heart failure, NYHA class 4 ([facility] [I50.43]  Date: 1/20/2022     Time: 0900      Subjective: Today, she is awake, alert, eating breakfast.  Denies chest pain, SOB, palpitations. She remains in afib with HR now controlled. She did not receive her coreg yesterday per MAR. Assessment and Plan     1. Afib/aflutter:   -HR currently controlled 70's-80s (40's-50 low yesterday evening)   -Coreg reduced to 12.5 mg BID today with hold parameters added.    -Diltiazem IR  30 mg TID, continue.   -Consider PPM/AVN if not able to control with medications   -[id number]. Eliquis on hold since last admission due to anemia. Consider watchman as outpatient if unable to resume Eliquis. 2. Cardiomyopathy/acute on chronic HFmrEF   -EF 40-45% (TTE 1/9/2022)   -Diuretics changed to po today.(per renal)   -Coreg, Avoid ace-I /ARB (MORENO on CKD)   -no isordil/hydralazine- low-low NL BP     3. Mild-moderate MR    4. CAD   -s/p CABG. s/p PCI LAD 4/2021. Nuc stress 1/11/22 no  ischemia or infarction    -Plavix, statin, BB    5. HTN   -bp controlled- low.   -coreg, diltiazem  7. Anemia   -unclear etiology   -Hgb 7.5, unchanged from yesterday   -Stool OB negative.    -Defer evaluation to primary team   -Eliquis on hold    Other comorbids:  8. MORENO on CKD: per renal. Creatinine stable. 9.  History of LBBB  10. History of CVA:  on plavix, asa, statin  11. History of Carotid artery disease s/p Left CEA    Remains in afib but HR is now controlled and BP improved. Agree with reduction of coreg to 12.5 mg BID today, added new hold parameters.   No OAC due to marked anemia- can be considered for [employer] as outpatient. She will need follow up with Dr. Honey Perez outpatient. [facility]. SUAD Sue    Saw and evaluated pt and agree with above assessment and plan. HR is now controlled on po BB/CCB. Aware of pacer, AVN ablation if she does not respond to medications. No OAC at this time, consider watchman outpt. Diuretics per renal. outpt f/u with Dr. Honey Perez.  No additional recs and will be available prn  Justo Alves MD    [facility]  Past Medical History:   Diagnosis Date    Anxiety disorder     Atrial fibrillation ([facility]     CAD (coronary artery disease) 2007    stents, CABG x 3v    Carotid stenosis     Cervical stenosis of spinal canal 2019    Chronic kidney disease     Cough     CVA (cerebral vascular accident) ([facility] 2019    left lacunar infarct at head of caudate    Depression     AND CHRONIC ANXIETY    Diabetes ([facility]     GERD (gastroesophageal reflux disease)     High cholesterol     History of peptic ulcer     Bleeding ulcer with increased NSAID use    Hypertension     Left bundle branch block 2022    Left carotid stenosis 2019    s/p left CEA with Dr. Abundio Bautista, old 2007    PUD (peptic ulcer disease)     Stroke ([facility]     Tremor     Valvular heart disease       Social Hx  Social History     Socioeconomic History    Marital status: SINGLE     Spouse name: Not on file    Number of children: Not on file    Years of education: Not on file    Highest education level: Not on file   Occupational History    Occupation: Retired realestate/teacher   Tobacco Use    Smoking status: Former Smoker     Packs/day: 0.25     Years: 5.00     Pack years: 1.25     Types: Cigarettes     Quit date:      Years since quittin.0    Smokeless tobacco: Never Used   Substance and Sexual Activity    Alcohol use: Not Currently     Comment: Rare    Drug use: No    Sexual activity: Not on file   Other Topics Concern    Not on file   Social History Narrative    L[facility] in [facility]     Social Determinants of Health     Financial Resource Strain:     Difficulty of Paying Living Expenses: Not on file   Food Insecurity:     Worried About Running Out of Food in the Last Year: Not on file    Juany of Food in the Last Year: Not on file   Transportation Needs:     Lack of Transportation (Medical): Not on file    Lack of Transportation (Non-Medical): Not on file   Physical Activity:     Days of Exercise per Week: Not on file    Minutes of Exercise per Session: Not on file   Stress:     Feeling of Stress : Not on file   Social Connections:     Frequency of Communication with Friends and Family: Not on file    Frequency of Social Gatherings with Friends and Family: Not on file    Attends [employer] Services: Not on file    Active Member of [address] [place] [address] [employer] or Organizations: Not on file    Attends Club or Organization Meetings: Not on file    Marital Status: Not on file   Intimate Partner Violence:     Fear of Current or Ex-Partner: Not on file    Emotionally Abused: Not on file    Physically Abused: Not on file    Sexually Abused: Not on file   Housing Stability:     Unable to Pay for Housing in the Last Year: Not on file    Number of [place] in the Last Year: Not on file    Unstable Housing in the Last Year: Not on file       ROS:  Pertinent items are noted in HPI. Objective:      Physical Exam:                Visit Vitals  BP (!) 126/54 (BP 1 Location: Right upper arm, BP Patient Position: At rest)   Pulse 71   Temp 97.6 °F (36.4 °C)   Resp 13   Ht 5' 5\" (1.651 m)   Wt 75 kg (165 lb 5.5 oz)   SpO2 100%   BMI 27.51 kg/m²          General Appearance:   Well developed, drowsy, awakens to voice, answeres questions. oriented x 3, and   individual in no acute distress. Ears/Nose/Mouth/Throat:    Hearing grossly normal.         Neck:  Supple.    Chest:    clear to auscultation bilaterally   Cardiovascular:   Irregularly irregular Regular rate and rhythm, S1, S2 normal, I/VI systolic murmur LLSB Abdomen:    Soft, non-tender, bowel sounds are active. Extremities:  no BLE edema   Skin:  Warm and dry. Telemetry: afib, rate controlled this a.m      Data Review:    Labs:    Recent Results (from the past 24 hour(s))   GLUCOSE, POC    Collection Time: 01/19/22 12:39 PM   Result Value Ref Range    Glucose (POC) 127 (H) 65 - 117 mg/dL    Performed by Sergio DUTTON    GLUCOSE, POC    Collection Time: 01/19/22  4:10 PM   Result Value Ref Range    Glucose (POC) 156 (H) 65 - 117 mg/dL    Performed by Tanya Hernandez    GLUCOSE, POC    Collection Time: 01/19/22  5:14 PM   Result Value Ref Range    Glucose (POC) 124 (H) 65 - 117 mg/dL    Performed by Loretta Patel    GLUCOSE, POC    Collection Time: 01/19/22  9:06 PM   Result Value Ref Range    Glucose (POC) 154 (H) 65 - 117 mg/dL    Performed by Jessica Sarmiento    CBC WITH AUTOMATED DIFF    Collection Time: 01/20/22  6:48 AM   Result Value Ref Range    WBC 4.1 3.6 - 11.0 K/uL    RBC 2.30 (L) 3.80 - 5.20 M/uL    HGB 7.4 (L) 11.5 - 16.0 g/dL    HCT 23.2 (L) 35.0 - 47.0 %    .9 (H) 80.0 - 99.0 FL    MCH 32.2 26.0 - 34.0 PG    MCHC 31.9 30.0 - 36.5 g/dL    RDW 13.2 11.5 - 14.5 %    PLATELET 342 584 - 079 K/uL    MPV 9.0 8.9 - 12.9 FL    NRBC 0.0 0  WBC    ABSOLUTE NRBC 0.00 0.00 - 0.01 K/uL    NEUTROPHILS 61 32 - 75 %    LYMPHOCYTES 21 12 - 49 %    MONOCYTES 11 5 - 13 %    EOSINOPHILS 6 0 - 7 %    BASOPHILS 1 0 - 1 %    IMMATURE GRANULOCYTES 0 0.0 - 0.5 %    ABS. NEUTROPHILS 2.5 1.8 - 8.0 K/UL    ABS. LYMPHOCYTES 0.8 0.8 - 3.5 K/UL    ABS. MONOCYTES 0.5 0.0 - 1.0 K/UL    ABS. EOSINOPHILS 0.3 0.0 - 0.4 K/UL    ABS. BASOPHILS 0.0 0.0 - 0.1 K/UL    ABS. IMM.  GRANS. 0.0 0.00 - 0.04 K/UL    DF AUTOMATED     METABOLIC PANEL, BASIC    Collection Time: 01/20/22  6:48 AM   Result Value Ref Range    Sodium 138 136 - 145 mmol/L    Potassium 3.7 3.5 - 5.1 mmol/L    Chloride 106 97 - 108 mmol/L    CO2 27 21 - 32 mmol/L    Anion gap 5 5 - 15 mmol/L Glucose 91 65 - 100 mg/dL    BUN 52 (H) 6 - 20 MG/DL    Creatinine 1.65 (H) 0.55 - 1.02 MG/DL    BUN/Creatinine ratio 32 (H) 12 - 20      GFR est AA 36 (L) >60 ml/min/1.73m2    GFR est non-AA 30 (L) >60 ml/min/1.73m2    Calcium 8.7 8.5 - 10.1 MG/DL   GLUCOSE, POC    Collection Time: 01/20/22  8:39 AM   Result Value Ref Range    Glucose (POC) 99 65 - 117 mg/dL    Performed by [facility]           Radiology:        Current Facility-Administered Medications   Medication Dose Route Frequency    carvediloL (COREG) tablet 12.5 mg  12.5 mg Oral BID WITH MEALS    dilTIAZem IR (CARDIZEM) tablet 30 mg  30 mg Oral TIDAC    bumetanide (BUMEX) tablet 1 mg  1 mg Oral BID    busPIRone (BUSPAR) tablet 5 mg  5 mg Oral TID PRN    insulin lispro (HUMALOG) injection   SubCUTAneous AC&HS    glucose chewable tablet 16 g  4 Tablet Oral PRN    dextrose (D50W) injection syrg 12.5-25 g  12.5-25 g IntraVENous PRN    glucagon (GLUCAGEN) injection 1 mg  1 mg IntraMUSCular PRN    ARIPiprazole (ABILIFY) tablet 20 mg  20 mg Oral DAILY    atorvastatin (LIPITOR) tablet 40 mg  40 mg Oral QHS    carbidopa-levodopa (SINEMET)  mg per tablet 2 Tablet  2 Tablet Oral QID    cholecalciferol (VITAMIN D3) (1000 Units /25 mcg) tablet 1,000 Units  1,000 Units Oral DAILY    cilostazoL (PLETAL) tablet 100 mg  100 mg Oral ACB&D    clopidogreL (PLAVIX) tablet 75 mg  75 mg Oral DAILY AFTER BREAKFAST    cyanocobalamin (VITAMIN B12) tablet 100 mcg  100 mcg Oral DAILY    DULoxetine (CYMBALTA) capsule 120 mg  120 mg Oral DAILY    nitroglycerin (NITROSTAT) tablet 0.4 mg  0.4 mg SubLINGual Q5MIN PRN    pantoprazole (PROTONIX) tablet 40 mg  40 mg Oral ACB    polyethylene glycol (MIRALAX) packet 17 g  17 g Oral DAILY PRN    senna-docusate (PERICOLACE) 8.6-50 mg per tablet 1 Tablet  1 Tablet Oral QHS    albuterol-ipratropium (DUO-NEB) 2.5 MG-0.5 MG/3 ML  3 mL Nebulization Q6H PRN          Nini Viet.  SUAD Sue     [facility] [address] [place] [address] Drive, [address] [place] [address],[address]   Emilia Head   [phone]

## 2022-01-21 LAB
ALBUMIN SERPL-MCNC: 3.4 G/DL (ref 3.5–5)
ANION GAP SERPL CALC-SCNC: 6 MMOL/L (ref 5–15)
BASOPHILS # BLD: 0.1 K/UL (ref 0–0.1)
BASOPHILS NFR BLD: 1 % (ref 0–1)
BUN SERPL-MCNC: 50 MG/DL (ref 6–20)
BUN/CREAT SERPL: 29 (ref 12–20)
CALCIUM SERPL-MCNC: 9.1 MG/DL (ref 8.5–10.1)
CHLORIDE SERPL-SCNC: 104 MMOL/L (ref 97–108)
CO2 SERPL-SCNC: 24 MMOL/L (ref 21–32)
CREAT SERPL-MCNC: 1.73 MG/DL (ref 0.55–1.02)
D DIMER PPP FEU-MCNC: 2.59 MG/L FEU (ref 0–0.65)
DIFFERENTIAL METHOD BLD: ABNORMAL
EOSINOPHIL # BLD: 0.3 K/UL (ref 0–0.4)
EOSINOPHIL NFR BLD: 5 % (ref 0–7)
ERYTHROCYTE [DISTWIDTH] IN BLOOD BY AUTOMATED COUNT: 13 % (ref 11.5–14.5)
GLUCOSE BLD STRIP.AUTO-MCNC: 118 MG/DL (ref 65–117)
GLUCOSE BLD STRIP.AUTO-MCNC: 181 MG/DL (ref 65–117)
GLUCOSE BLD STRIP.AUTO-MCNC: 202 MG/DL (ref 65–117)
GLUCOSE BLD STRIP.AUTO-MCNC: 249 MG/DL (ref 65–117)
GLUCOSE SERPL-MCNC: 102 MG/DL (ref 65–100)
HCT VFR BLD AUTO: 26.1 % (ref 35–47)
HGB BLD-MCNC: 8 G/DL (ref 11.5–16)
IMM GRANULOCYTES # BLD AUTO: 0 K/UL (ref 0–0.04)
IMM GRANULOCYTES NFR BLD AUTO: 0 % (ref 0–0.5)
LYMPHOCYTES # BLD: 0.8 K/UL (ref 0.8–3.5)
LYMPHOCYTES NFR BLD: 15 % (ref 12–49)
MCH RBC QN AUTO: 31.9 PG (ref 26–34)
MCHC RBC AUTO-ENTMCNC: 30.7 G/DL (ref 30–36.5)
MCV RBC AUTO: 104 FL (ref 80–99)
MONOCYTES # BLD: 0.5 K/UL (ref 0–1)
MONOCYTES NFR BLD: 10 % (ref 5–13)
NEUTS SEG # BLD: 3.5 K/UL (ref 1.8–8)
NEUTS SEG NFR BLD: 69 % (ref 32–75)
NRBC # BLD: 0 K/UL (ref 0–0.01)
NRBC BLD-RTO: 0 PER 100 WBC
PHOSPHATE SERPL-MCNC: 4.1 MG/DL (ref 2.6–4.7)
PLATELET # BLD AUTO: 215 K/UL (ref 150–400)
PMV BLD AUTO: 9 FL (ref 8.9–12.9)
POTASSIUM SERPL-SCNC: 3.8 MMOL/L (ref 3.5–5.1)
PROCALCITONIN SERPL-MCNC: <0.05 NG/ML
RBC # BLD AUTO: 2.51 M/UL (ref 3.8–5.2)
RBC MORPH BLD: ABNORMAL
SERVICE CMNT-IMP: ABNORMAL
SODIUM SERPL-SCNC: 134 MMOL/L (ref 136–145)
UR CULT HOLD, URHOLD: NORMAL
WBC # BLD AUTO: 5.2 K/UL (ref 3.6–11)

## 2022-01-21 PROCEDURE — 84145 PROCALCITONIN (PCT): CPT

## 2022-01-21 PROCEDURE — 65660000001 HC RM ICU INTERMED STEPDOWN

## 2022-01-21 PROCEDURE — 74011250636 HC RX REV CODE- 250/636: Performed by: HOSPITALIST

## 2022-01-21 PROCEDURE — 74011250637 HC RX REV CODE- 250/637: Performed by: HOSPITALIST

## 2022-01-21 PROCEDURE — 99223 1ST HOSP IP/OBS HIGH 75: CPT | Performed by: NURSE PRACTITIONER

## 2022-01-21 PROCEDURE — 36415 COLL VENOUS BLD VENIPUNCTURE: CPT

## 2022-01-21 PROCEDURE — 85379 FIBRIN DEGRADATION QUANT: CPT

## 2022-01-21 PROCEDURE — 84156 ASSAY OF PROTEIN URINE: CPT

## 2022-01-21 PROCEDURE — 85025 COMPLETE CBC W/AUTO DIFF WBC: CPT

## 2022-01-21 PROCEDURE — 82784 ASSAY IGA/IGD/IGG/IGM EACH: CPT

## 2022-01-21 PROCEDURE — 82962 GLUCOSE BLOOD TEST: CPT

## 2022-01-21 PROCEDURE — 80069 RENAL FUNCTION PANEL: CPT

## 2022-01-21 PROCEDURE — 74011636637 HC RX REV CODE- 636/637: Performed by: HOSPITALIST

## 2022-01-21 PROCEDURE — 77010033678 HC OXYGEN DAILY

## 2022-01-21 PROCEDURE — 74011250637 HC RX REV CODE- 250/637: Performed by: NURSE PRACTITIONER

## 2022-01-21 PROCEDURE — 94760 N-INVAS EAR/PLS OXIMETRY 1: CPT

## 2022-01-21 RX ADMIN — CARBIDOPA AND LEVODOPA 2 TABLET: 25; 100 TABLET ORAL at 17:35

## 2022-01-21 RX ADMIN — CARBIDOPA AND LEVODOPA 2 TABLET: 25; 100 TABLET ORAL at 12:17

## 2022-01-21 RX ADMIN — VITAM B12 100 MCG: 100 TAB at 09:02

## 2022-01-21 RX ADMIN — Medication 1000 UNITS: at 09:02

## 2022-01-21 RX ADMIN — GUAIFENESIN 600 MG: 600 TABLET, EXTENDED RELEASE ORAL at 09:02

## 2022-01-21 RX ADMIN — DEXAMETHASONE 6 MG: 4 TABLET ORAL at 12:17

## 2022-01-21 RX ADMIN — DULOXETINE 120 MG: 60 CAPSULE, DELAYED RELEASE ORAL at 09:02

## 2022-01-21 RX ADMIN — Medication 3 UNITS: at 17:35

## 2022-01-21 RX ADMIN — BUMETANIDE 1 MG: 1 TABLET ORAL at 09:02

## 2022-01-21 RX ADMIN — DILTIAZEM HYDROCHLORIDE 30 MG: 30 TABLET, FILM COATED ORAL at 17:35

## 2022-01-21 RX ADMIN — BUMETANIDE 1 MG: 1 TABLET ORAL at 18:00

## 2022-01-21 RX ADMIN — DILTIAZEM HYDROCHLORIDE 30 MG: 30 TABLET, FILM COATED ORAL at 12:17

## 2022-01-21 RX ADMIN — BUSPIRONE HYDROCHLORIDE 5 MG: 5 TABLET ORAL at 22:12

## 2022-01-21 RX ADMIN — CARBIDOPA AND LEVODOPA 2 TABLET: 25; 100 TABLET ORAL at 09:02

## 2022-01-21 RX ADMIN — CARVEDILOL 12.5 MG: 12.5 TABLET, FILM COATED ORAL at 17:35

## 2022-01-21 RX ADMIN — PANTOPRAZOLE SODIUM 40 MG: 40 TABLET, DELAYED RELEASE ORAL at 09:02

## 2022-01-21 RX ADMIN — CILOSTAZOL 100 MG: 50 TABLET ORAL at 09:02

## 2022-01-21 RX ADMIN — CILOSTAZOL 100 MG: 50 TABLET ORAL at 17:35

## 2022-01-21 RX ADMIN — CARBIDOPA AND LEVODOPA 2 TABLET: 25; 100 TABLET ORAL at 21:11

## 2022-01-21 RX ADMIN — GUAIFENESIN 600 MG: 600 TABLET, EXTENDED RELEASE ORAL at 21:11

## 2022-01-21 RX ADMIN — Medication 2 UNITS: at 22:12

## 2022-01-21 RX ADMIN — ARIPIPRAZOLE 20 MG: 5 TABLET ORAL at 09:01

## 2022-01-21 RX ADMIN — CARVEDILOL 12.5 MG: 12.5 TABLET, FILM COATED ORAL at 09:01

## 2022-01-21 RX ADMIN — ATORVASTATIN CALCIUM 40 MG: 40 TABLET, FILM COATED ORAL at 21:11

## 2022-01-21 RX ADMIN — DILTIAZEM HYDROCHLORIDE 30 MG: 30 TABLET, FILM COATED ORAL at 09:14

## 2022-01-21 RX ADMIN — CLOPIDOGREL 75 MG: 75 TABLET, FILM COATED ORAL at 09:02

## 2022-01-21 RX ADMIN — Medication 2 UNITS: at 12:17

## 2022-01-21 NOTE — PROGRESS NOTES
Chart checked, case discussed with pt's nurse who reports that this morining when pt sat up at the EOB her HR zoey to the 160s (pt is in a fib). PT will defer at this time, follow and see as able and appropriate.  Thank you, Luli Galdamez, PT

## 2022-01-21 NOTE — PROGRESS NOTES
Nephrology Progress Note  Coco Capps  Date of Admission : 1/15/2022    CC:  Follow up for MORENO on CKD       Assessment and Plan     MORENO on CKD:  - diff: progressive disease vs CRS  - Cr stable, volume better  - cont oral diuretics  - daily labs     Afib with RVR  - per cards     CKD IV:  - baseline increasing, likely around 1.6 to 1.8 now     COVID-19 +    HTN:  - BP stable    Anemia:  - w/u underway     Encephalopathy:  - w/u per primary team    Acute on Chronic HFpEF  CAD, hx of CABG  - ECHO w/ EF 55-60%  - diuretics as above         Parkinsons  Hx of CVA  PAD w/ L CEA  AAA       Interval History:  Not examined in the room. COVID + on 1/20. Cr stable. BP stable. Tachycardic. No reported cp, sob, n/v/d. More awake and alert    Current Medications: all current  Medications have been eviewed in EPIC  Review of Systems: Pertinent items are noted in HPI. Objective:  Vitals:    Vitals:    01/21/22 0642 01/21/22 0800 01/21/22 0906 01/21/22 1000   BP: (!) 146/70      Pulse:  96 (!) 118 93   Resp:   27    Temp:       SpO2:   92%    Weight:       Height:         Intake and Output:  01/21 0701 - 01/21 1900  In: 300 [P.O.:300]  Out: 500 [Urine:500]  01/19 1901 - 01/21 0700  In: 600 [P.O.:600]  Out: 2800 [Urine:2800]    Physical Examination:  General: Confused, lethargic  Neck:  Supple, no mass  Resp:  Lungs CTA B/L, no wheezing , normal respiratory effort  CV:  RRR,  no murmur or rub, trace LE edema  GI:  Soft, NT, + Bowel sounds, no hepatosplenomegaly  Neurologic:  confused  Psych:             Unable to assess   Skin:  No Rash  :  No pittman    []    High complexity decision making was performed  []    Patient is at high-risk of decompensation with multiple organ involvement    Lab Data Personally Reviewed: I have reviewed all the pertinent labs, microbiology data and radiology studies during assessment.     Recent Labs     01/21/22  0649 01/20/22  0648 01/19/22  0433   * 138 136   K 3.8 3.7 4.2   CL 104 106 104   CO2 24 27 25   * 91 94   BUN 50* 52* 52*   CREA 1.73* 1.65* 1.91*   CA 9.1 8.7 8.9   PHOS 4.1  --   --    ALB 3.4*  --   --      Recent Labs     01/21/22  0649 01/20/22  0648 01/19/22  0433   WBC 5.2 4.1 4.0   HGB 8.0* 7.4* 7.5*   HCT 26.1* 23.2* 23.3*    202 209     No results found for: SDES  Lab Results   Component Value Date/Time    Culture result: NO GROWTH 5 DAYS 01/07/2022 08:37 PM    Culture result: MRSA NOT PRESENT 12/10/2021 06:53 AM    Culture result:  12/10/2021 06:53 AM     Screening of patient nares for MRSA is for surveillance purposes and, if positive, to facilitate isolation considerations in high risk settings. It is not intended for automatic decolonization interventions per se as regimens are not sufficiently effective to warrant routine use.      Recent Results (from the past 24 hour(s))   COVID-19 RAPID TEST    Collection Time: 01/20/22 12:22 PM   Result Value Ref Range    Specimen source Nasopharyngeal      COVID-19 rapid test Detected (A) NOTD     GLUCOSE, POC    Collection Time: 01/20/22  4:54 PM   Result Value Ref Range    Glucose (POC) 178 (H) 65 - 117 mg/dL    Performed by Onita Mouse    GLUCOSE, POC    Collection Time: 01/20/22  9:30 PM   Result Value Ref Range    Glucose (POC) 131 (H) 65 - 117 mg/dL    Performed by Onita Mouse    RENAL FUNCTION PANEL    Collection Time: 01/21/22  6:49 AM   Result Value Ref Range    Sodium 134 (L) 136 - 145 mmol/L    Potassium 3.8 3.5 - 5.1 mmol/L    Chloride 104 97 - 108 mmol/L    CO2 24 21 - 32 mmol/L    Anion gap 6 5 - 15 mmol/L    Glucose 102 (H) 65 - 100 mg/dL    BUN 50 (H) 6 - 20 MG/DL    Creatinine 1.73 (H) 0.55 - 1.02 MG/DL    BUN/Creatinine ratio 29 (H) 12 - 20      GFR est AA 34 (L) >60 ml/min/1.73m2    GFR est non-AA 28 (L) >60 ml/min/1.73m2    Calcium 9.1 8.5 - 10.1 MG/DL    Phosphorus 4.1 2.6 - 4.7 MG/DL    Albumin 3.4 (L) 3.5 - 5.0 g/dL   CBC WITH AUTOMATED DIFF    Collection Time: 01/21/22  6:49 AM   Result Value Ref Range    WBC 5.2 3.6 - 11.0 K/uL    RBC 2.51 (L) 3.80 - 5.20 M/uL    HGB 8.0 (L) 11.5 - 16.0 g/dL    HCT 26.1 (L) 35.0 - 47.0 %    .0 (H) 80.0 - 99.0 FL    MCH 31.9 26.0 - 34.0 PG    MCHC 30.7 30.0 - 36.5 g/dL    RDW 13.0 11.5 - 14.5 %    PLATELET 442 616 - 204 K/uL    MPV 9.0 8.9 - 12.9 FL    NRBC 0.0 0  WBC    ABSOLUTE NRBC 0.00 0.00 - 0.01 K/uL    NEUTROPHILS 69 32 - 75 %    LYMPHOCYTES 15 12 - 49 %    MONOCYTES 10 5 - 13 %    EOSINOPHILS 5 0 - 7 %    BASOPHILS 1 0 - 1 %    IMMATURE GRANULOCYTES 0 0.0 - 0.5 %    ABS. NEUTROPHILS 3.5 1.8 - 8.0 K/UL    ABS. LYMPHOCYTES 0.8 0.8 - 3.5 K/UL    ABS. MONOCYTES 0.5 0.0 - 1.0 K/UL    ABS. EOSINOPHILS 0.3 0.0 - 0.4 K/UL    ABS. BASOPHILS 0.1 0.0 - 0.1 K/UL    ABS. IMM. GRANS. 0.0 0.00 - 0.04 K/UL    DF SMEAR SCANNED      RBC COMMENTS TRENT CELLS  PRESENT        RBC COMMENTS MACROCYTOSIS  1+        RBC COMMENTS OVALOCYTES  PRESENT       PROCALCITONIN    Collection Time: 01/21/22  6:49 AM   Result Value Ref Range    Procalcitonin <0.05 ng/mL   GLUCOSE, POC    Collection Time: 01/21/22  8:37 AM   Result Value Ref Range    Glucose (POC) 118 (H) 65 - 117 mg/dL    Performed by Neeraj Velasquez MD  [address]  Phone - [phone]   Fax - [phone]  [email or web address]

## 2022-01-21 NOTE — PROGRESS NOTES
[address] [facility] Adult  Hospitalist Group                                                                                          Hospitalist Progress Note  Noah Gallo MD  Answering service: [phone] OR [phone] from in house phone        Date of Service:  2022  NAME:  Ramona Benavides  :  1940  MRN:  [id number]      Admission Summary:   [de-identified] yo female who was discharged from hospital on 1/15 after being admitted for CHF, Anemia/AFib/MORENO. Patient was on diuretics but then held on  due to hypotension. Pt had anemia and given 1 U PRBCs on . Pt's Eliquis stopped due to concern for bleeding. Patient's diuretics were not restarted and discharged to [facility]. Patient sent back to ER due to SOB. Interval history / Subjective:   Readmitted for acute pulmonary edema day after discharge  I have seen and examined patient this morning. She was lying in bed,on oxygen sats upper 90s and no complaints. RN in the room. In an attempt to get her sit by the edge of bed for exam, she became tachycardic up to 150s but denied dizziness,sob or chest pressure but said the movement was uncomfortable,I helped her lay back down and HR returned to the 100s quickly. Assessment & Plan:     Atrial Fib with RVR with poor activity tolerance with tachycardia with little exertion.  - Coreg dose decreased to 12.5 mg bid,on Cardizem IR 30 mg tid  - Recently taken off Apixaban  due to h/o GIB. EGD and colonoscopy on 2021 negative.  -Cardiology recommendation appreciated:PPM/AVN if rate difficult to control with medications and watchman as OP if unable to resume Apixaban     Acute respiratory failure with hypoxia due to Pulmonary edema  Acute on chronic systolic CHF NYHA III  CAD s/p CABG/stent-c/w Plavix/Statin/BB  Hypertension with intermittent hypotension which had required IV boluses and albumin. BP meds being adjusted. --On oral Bumex. ON coreg, no ACEI/ARBs due to CKD/hypotension  --CXR on  showed resolution of the pulmonary edema. Minimal peripheral edema on exam.    Chronic anemia,macrocytic. Etiology not clear,yesenia multifactorial: nutritional, CKD? BM disorder. No overt GIB except when she had duodenal ulcer in 2014. She needs further work up to delineate the cause of the anemia as this has become barrier to her being on anticoagulation for secondary stroke prevention. --She got blood on 1/11/2022 and 4/2021  --Stool studies have been negative for blood. --Recent EGD and colonoscopy without evidence of bleeding,ulcer etc except incidental findings of hiatal hernia,polyps. --Check VB12, Folate,SPEP,UPEP,IF  --Consult hematology       Drowsiness/acute metabolic encephalopathy,A&Ox3 on 1/21  History of left lacunar infarct/acute CVA  history of left carotid stenosis s/p CEA  -ABG unremarkable  -Noted between 04/2021 to 11/2021 Buspar/neurontin/abilify was added to Cymbalta. Discussed with pharmacy, will discontinue buspar and night time neurontin  -resolved     CKD 4-c/w diuretics-Appreciate nephrology    Parkinson's-c/w sinemet     Multiple hospitalizations. 5 admissions since 11/202. Awaiting palliative care    CoVID +,chronic hypoxia: will give her decadron 6 mg x10 days. --Check D dimer and inflammatory markers         Code status: DNR   DVT prophylaxis: SCDs    She is tachycardic to the 150 with minimal activity and still with intermittent hypotension. Needs on going monitoring and adjustment of medications,likely to be here for over the weekend. Care Plan discussed with: William Crowley.   Anticipated Disposition: SNF/LTC       Hospital Problems  Date Reviewed: 1/17/2022          Codes Class Noted POA    * (Principal) Pulmonary edema ICD-10-CM: J81.1  ICD-9-CM: 062  1/16/2022 Yes        Atrial fibrillation with RVR ([facility] [address] ICD-10-CM: I48.91  ICD-9-CM: 427.31  1/16/2022 Unknown        Acute respiratory failure with hypoxia [facility]) ICD-10-CM: J96.01  ICD-9-CM: 518.81  1/16/2022 Unknown        Acute on chronic systolic and diastolic heart failure, NYHA class 4 (HCC) ICD-10-CM: I50.43  ICD-9-CM: 428.43  1/16/2022 Unknown        CHF exacerbation (HCC) ICD-10-CM: I50.9  ICD-9-CM: 428.0  1/15/2022 Unknown        Left bundle branch block ICD-10-CM: I44.7  ICD-9-CM: 426.3  1/1/2022 Unknown                Review of Systems:   A comprehensive review of systems was negative except for that written in the HPI. Vital Signs:    Last 24hrs VS reviewed since prior progress note. Most recent are:  Visit Vitals  BP (!) 146/70   Pulse 87   Temp 98.4 °F (36.9 °C)   Resp 18   Ht 5' 5\" (1.651 m)   Wt 75 kg (165 lb 5.5 oz)   SpO2 96%   BMI 27.51 kg/m²         Intake/Output Summary (Last 24 hours) at 1/21/2022 4359  Last data filed at 1/21/2022 0400  Gross per 24 hour   Intake 600 ml   Output 1800 ml   Net -1200 ml        Physical Examination:     I had a face to face encounter with this patient and independently examined them on 1/21/2022 as outlined below:          Constitutional:  No acute distress, cooperative, pleasant    ENT:  Oral mucosa moist, oropharynx benign. Resp:  On oxygen via NC.decreased BS with rales at bases   CV:  irregularly irregular ,tachycardic     GI:  Soft, non distended, non tender. normoactive bowel sounds,     Musculoskeletal: Trace  edema, warm, 2+ pulses throughout    Neurologic:  Moves all extremities.   AAOx3, CN II-XII reviewed            Data Review:    Review and/or order of clinical lab test  Review and/or order of tests in the radiology section of CPT  Review and/or order of tests in the medicine section of CPT      Labs:     Recent Labs     01/21/22  0649 01/20/22  0648   WBC 5.2 4.1   HGB 8.0* 7.4*   HCT 26.1* 23.2*    202     Recent Labs     01/21/22  0649 01/20/22  0648 01/19/22  0433   * 138 136   K 3.8 3.7 4.2    106 104   CO2 24 27 25   BUN 50* 52* 52*   CREA 1.73* 1.65* 1.91*   * 91 94   CA 9.1 8.7 8.9   PHOS 4.1  --   --      Recent Labs     01/21/22  0649 ALB 3.4*     No results for input(s): INR, PTP, APTT, INREXT, INREXT in the last 72 hours. No results for input(s): FE, TIBC, PSAT, FERR in the last 72 hours. Lab Results   Component Value Date/Time    Folate 7.8 12/14/2021 04:40 AM      No results for input(s): PH, PCO2, PO2 in the last 72 hours. No results for input(s): CPK, CKNDX, TROIQ in the last 72 hours.     No lab exists for component: CPKMB  Lab Results   Component Value Date/Time    Cholesterol, total 148 09/23/2020 04:27 AM    HDL Cholesterol 52 09/23/2020 04:27 AM    LDL, calculated 83.8 09/23/2020 04:27 AM    Triglyceride 61 09/23/2020 04:27 AM    CHOL/HDL Ratio 2.8 09/23/2020 04:27 AM     Lab Results   Component Value Date/Time    Glucose (POC) 118 (H) 01/21/2022 08:37 AM    Glucose (POC) 131 (H) 01/20/2022 09:30 PM    Glucose (POC) 178 (H) 01/20/2022 04:54 PM    Glucose (POC) 129 (H) 01/20/2022 11:18 AM    Glucose (POC) 99 01/20/2022 08:39 AM     Lab Results   Component Value Date/Time    Color YELLOW/STRAW 12/30/2021 08:23 PM    Appearance CLOUDY (A) 12/30/2021 08:23 PM    Specific gravity 1.017 12/30/2021 08:23 PM    pH (UA) 6.0 12/30/2021 08:23 PM    Protein 100 (A) 12/30/2021 08:23 PM    Glucose Negative 12/30/2021 08:23 PM    Ketone Negative 12/30/2021 08:23 PM    Bilirubin Negative 12/30/2021 08:23 PM    Urobilinogen 1.0 12/30/2021 08:23 PM    Nitrites Negative 12/30/2021 08:23 PM    Leukocyte Esterase MODERATE (A) 12/30/2021 08:23 PM    Epithelial cells FEW 12/30/2021 08:23 PM    Bacteria 1+ (A) 12/30/2021 08:23 PM    WBC 0-4 12/30/2021 08:23 PM    RBC 0-5 12/30/2021 08:23 PM         Medications Reviewed:     Current Facility-Administered Medications   Medication Dose Route Frequency    carvediloL (COREG) tablet 12.5 mg  12.5 mg Oral BID WITH MEALS    guaiFENesin ER (MUCINEX) tablet 600 mg  600 mg Oral Q12H    dilTIAZem IR (CARDIZEM) tablet 30 mg  30 mg Oral TIDAC    bumetanide (BUMEX) tablet 1 mg  1 mg Oral BID    busPIRone (BUSPAR) tablet 5 mg  5 mg Oral TID PRN    insulin lispro (HUMALOG) injection   SubCUTAneous AC&HS    glucose chewable tablet 16 g  4 Tablet Oral PRN    dextrose (D50W) injection syrg 12.5-25 g  12.5-25 g IntraVENous PRN    glucagon (GLUCAGEN) injection 1 mg  1 mg IntraMUSCular PRN    ARIPiprazole (ABILIFY) tablet 20 mg  20 mg Oral DAILY    atorvastatin (LIPITOR) tablet 40 mg  40 mg Oral QHS    carbidopa-levodopa (SINEMET)  mg per tablet 2 Tablet  2 Tablet Oral QID    cholecalciferol (VITAMIN D3) (1000 Units /25 mcg) tablet 1,000 Units  1,000 Units Oral DAILY    cilostazoL (PLETAL) tablet 100 mg  100 mg Oral ACB&D    clopidogreL (PLAVIX) tablet 75 mg  75 mg Oral DAILY AFTER BREAKFAST    cyanocobalamin (VITAMIN B12) tablet 100 mcg  100 mcg Oral DAILY    DULoxetine (CYMBALTA) capsule 120 mg  120 mg Oral DAILY    nitroglycerin (NITROSTAT) tablet 0.4 mg  0.4 mg SubLINGual Q5MIN PRN    pantoprazole (PROTONIX) tablet 40 mg  40 mg Oral ACB    polyethylene glycol (MIRALAX) packet 17 g  17 g Oral DAILY PRN    senna-docusate (PERICOLACE) 8.6-50 mg per tablet 1 Tablet  1 Tablet Oral QHS    albuterol-ipratropium (DUO-NEB) 2.5 MG-0.5 MG/3 ML  3 mL Nebulization Q6H PRN     ______________________________________________________________________  EXPECTED LENGTH OF STAY: 3d 19h  ACTUAL LENGTH OF STAY:          5                 Hamlet Ruth MD

## 2022-01-21 NOTE — PROGRESS NOTES
Transition Plan of Care  RUR 32%-High  Disposition-Covid positive. Per attending she is unstable to discharge today and will likely be here through the weekend. When stable she will discharge to [address] [place] [address] for [facility]. ELIZABETH spoke with Hattie Fraser at the facility and she is aware that per attending, she will likely be here through the weekend.

## 2022-01-21 NOTE — PROGRESS NOTES
Verbal bedside report given to Charlie Morales RN oncoming nurse by Inocencio Franco. Nahum Pedroza RN off-going nurse. Report included current pt status and condition, recent results, hx of present illness, heart rate and rhythm, and respiratory status. 1600  Pt refusing SCDs. 1400  Attempted orthostatics, heart rate zoey to 160s, aborted attempt. 0930  Pt heart rate zoey significantly during cleaning, MD aware.

## 2022-01-21 NOTE — PROGRESS NOTES
Bedside shift change report given to LAMONT Mcgovern (oncoming nurse) by Ever Mayes RN (offgoing nurse). Report included the following information SBAR, Kardex, ED Summary, Procedure Summary, Intake/Output, MAR, Accordion, Recent Results, Med Rec Status, Cardiac Rhythm AFib and Alarm Parameters .

## 2022-01-21 NOTE — CONSULTS
Palliative Medicine Consult  [place]: [phone] ([phone])    Patient Name: Omayra Mckinnon  YOB: 1940    Date of Initial Consult: January 21, 2022  Reason for Consult: Care decisions  Requesting Provider: Dr. Yo Ravi  Primary Care Physician: Jadine Ormond, DO     SUMMARY:   Omayra Mckinnon is a [de-identified] y.o. female admitted on 1/15/2022 from: [facility] with a diagnosis of acute on chronic respiratory failure due to CHF exacerbation, MORENO on CKD, pulmonary edema, intermittent hypotension, chronic anemia, metabolic encephalopathy, COVID+, chronic hypoxia. Patient initially presented with shortness of breath. Patient was discharged the day before returning back to the emergency department. She was admitted for CHF exacerbation. PMH: Anxiety disorder, Parkinson's ,A. fib, CAD, carotid stenosis, cervical stenosis of spinal canal, CKD, CVA, depression, DM, anxiety, GERD, high cholesterol, PUD, hypertension, MI, valvular heart disease    Current medical issues leading to Palliative Medicine involvement include: Support with care decisions given underlying morbidities and recurrent admission. .     Social: lives at home alone, no children, native of [place], former      PALLIATIVE DIAGNOSES:   1. Palliative care encounter  2. Shortness of breath  3. Hypoxia  4. Physical debility  5. Goals of care       PLAN:   1. Pt seen without family present. Pt has met with our team in the past  2. Pt feeling better since admission. Explained our role and goal of discussing what is most important at this time and ensuring goals are reflected in care  3. Pt understands that she is not strong enough to go straight home after discharge. She has been to [facility] for rehab and likes it there. Her goal at this time is to address acute issues then go to [facility] again before returning to her apartment. 4. She feels that her needs are met at home. She has a friend and family member that helps her.   She prepares sandwiches mostly for dinner. 5. I explained that hospice is an option if ever the feeling is tired of coming back to the hospital and the goal is to remain comfortable at home. Pt says she is not ready for hospice yet. 6. Affirmed DNR status. AMD on file  7. All questions and concerns addressed  8. Please re-consult if the patient's condition changes. Will sign off  9. Initial consult note routed to primary continuity provider and/or primary health care team members  10. Communicated plan of care with: Palliative [facility] Team     GOALS OF CARE / TREATMENT PREFERENCES:     GOALS OF CARE:  Patient/Health Care Proxy Stated Goals: Rehabilitation    TREATMENT PREFERENCES:   Code Status: DNR    Patient and family's personal goals include: rehab    Important upcoming milestones or family events: none    Advance Care Planning:  [] [facility] Interdisciplinary Team has updated the ACP Navigator with Health Care Decision Maker and Patient Capacity      Primary Decision Maker: Richelle Westbrook - Other Relative - [phone]  Advance Care Planning 12/31/2021   Patient's Healthcare Decision Maker is: -   Confirm Advance Directive Yes, on file   Patient Would Like to Complete Advance Directive -   Does the patient have other document types -       Medical Interventions: Limited additional interventions       Other:    As far as possible, the palliative care team has discussed with patient / health care proxy about goals of care / treatment preferences for patient. HISTORY:     History obtained from: chart, pt, team    CHIEF COMPLAINT: pt admitted with shortness of breath     HPI/SUBJECTIVE:    The patient is:   [x] Verbal and participatory  [] Non-participatory due to:    No complaints    Clinical Pain Assessment (nonverbal scale for severity on nonverbal patients):   Clinical Pain Assessment  Severity: 0          Duration: for how long has pt been experiencing pain (e.g., 2 days, 1 month, years)  Frequency: how often pain is an issue (e.g., several times per day, once every few days, constant)     FUNCTIONAL ASSESSMENT:     Palliative Performance Scale (PPS):  PPS: 50       PSYCHOSOCIAL/SPIRITUAL SCREENING:     Palliative IDT has assessed this patient for cultural preferences / practices and a referral made as appropriate to needs (Cultural Services, Patient Advocacy, Ethics, etc.)    Any spiritual / Congregation concerns:  [] Yes /  [x] No   If \"Yes\" to discuss with pastoral care during IDT     Does caregiver feel burdened by caring for their loved one:   [] Yes /  [x] No /  [] No Caregiver Present    If \"Yes\" to discuss with social work during IDT    Anticipatory grief assessment:   [x] Normal  / [] Maladaptive     If \"Maladaptive\" to discuss with social work during IDT    ESAS Anxiety: Anxiety: 0    ESAS Depression: Depression: 0        REVIEW OF SYSTEMS:     Positive and pertinent negative findings in ROS are noted above in HPI. The following systems were [x] reviewed / [] unable to be reviewed as noted in HPI  Other findings are noted below. Systems: constitutional, ears/nose/mouth/throat, respiratory, gastrointestinal, genitourinary, musculoskeletal, integumentary, neurologic, psychiatric, endocrine. Positive findings noted below. Modified ESAS Completed by: provider   Fatigue: 0 Drowsiness: 0   Depression: 0 Pain: 0   Anxiety: 0 Nausea: 0   Anorexia: 0 Dyspnea: 1           Stool Occurrence(s): 0        PHYSICAL EXAM:     From RN flowsheet:  Wt Readings from Last 3 Encounters:   01/15/22 165 lb 5.5 oz (75 kg)   01/14/22 165 lb 9.1 oz (75.1 kg)   01/06/22 164 lb 3.9 oz (74.5 kg)     Blood pressure 131/87, pulse 98, temperature 98.6 °F (37 °C), resp. rate 20, height 5' 5\" (1.651 m), weight 165 lb 5.5 oz (75 kg), SpO2 92 %.     Pain Scale 1: Numeric (0 - 10)  Pain Intensity 1: 0                 Last bowel movement, if known:     Constitutional: nad, conversant, pleasant   Eyes: pupils equal, anicteric  ENMT: no nasal discharge, moist mucous membranes  Cardiovascular:  Respiratory: breathing not labored, symmetric  Gastrointestinal: soft non-tender, +bowel sounds  Musculoskeletal: no deformity, no tenderness to palpation  Skin: warm, dry  Neurologic: following commands, moving all extremities  Psychiatric: full affect, no hallucinations  Other:       HISTORY:     Principal Problem:    Pulmonary edema (1/16/2022)    Active Problems:    CHF exacerbation ([address] (1/15/2022)      Atrial fibrillation with RVR ([facility] [address] (1/16/2022)      Acute respiratory failure with hypoxia ([facility]) (1/16/2022)      Acute on chronic systolic and diastolic heart failure, NYHA class 4 ([facility] [address] (1/16/2022)      Left bundle branch block (1/1/2022)      Past Medical History:   Diagnosis Date    Anxiety disorder     Atrial fibrillation (HCC)     CAD (coronary artery disease) 2007    stents, CABG x 3v    Carotid stenosis     Cervical stenosis of spinal canal 07/2019    Chronic kidney disease     Cough     CVA (cerebral vascular accident) ([facility] [address] 07/2019    left lacunar infarct at head of caudate    Depression     AND CHRONIC ANXIETY    Diabetes (HCC)     GERD (gastroesophageal reflux disease)     High cholesterol     History of peptic ulcer     Bleeding ulcer with increased NSAID use    Hypertension     Left bundle branch block 01/01/2022    Left carotid stenosis 07/2019    s/p left CEA with Dr. Toan Caceres, old 2007    PUD (peptic ulcer disease)     Stroke ([facility] [address]     Tremor     Valvular heart disease       Past Surgical History:   Procedure Laterality Date    COLONOSCOPY N/A 12/17/2021    COLONOSCOPY   :- performed [facility] S[facility]adas [facility] ENDOSCOPY    HX CAROTID ENDARTERECTOMY  07/2019    HX CORONARY ARTERY BYPASS GRAFT      HX TONSILLECTOMY  1963    MO CARDIAC SURG PROCEDURE UNLIST      CABG X3 VESSEL    MO TOTAL KNEE ARTHROPLASTY Right 2015      Family History   Problem Relation Age of Onset    Heart Attack Mother 72        Dec 79yo    Hypertension Mother     Other Father         Unknown    Parkinson's Disease Brother     Anesth Problems Neg Hx       History reviewed, no pertinent family history.   Social History     Tobacco Use    Smoking status: Former Smoker     Packs/day: 0.25     Years: 5.00     Pack years: 1.25     Types: Cigarettes     Quit date:      Years since quittin.0    Smokeless tobacco: Never Used   Substance Use Topics    Alcohol use: Not Currently     Comment: Rare     No Known Allergies   Current Facility-Administered Medications   Medication Dose Route Frequency    dexAMETHasone (DECADRON) tablet 6 mg  6 mg Oral DAILY    carvediloL (COREG) tablet 12.5 mg  12.5 mg Oral BID WITH MEALS    guaiFENesin ER (MUCINEX) tablet 600 mg  600 mg Oral Q12H    dilTIAZem IR (CARDIZEM) tablet 30 mg  30 mg Oral TIDAC    bumetanide (BUMEX) tablet 1 mg  1 mg Oral BID    busPIRone (BUSPAR) tablet 5 mg  5 mg Oral TID PRN    insulin lispro (HUMALOG) injection   SubCUTAneous AC&HS    glucose chewable tablet 16 g  4 Tablet Oral PRN    dextrose (D50W) injection syrg 12.5-25 g  12.5-25 g IntraVENous PRN    glucagon (GLUCAGEN) injection 1 mg  1 mg IntraMUSCular PRN    ARIPiprazole (ABILIFY) tablet 20 mg  20 mg Oral DAILY    atorvastatin (LIPITOR) tablet 40 mg  40 mg Oral QHS    carbidopa-levodopa (SINEMET)  mg per tablet 2 Tablet  2 Tablet Oral QID    cholecalciferol (VITAMIN D3) (1000 Units /25 mcg) tablet 1,000 Units  1,000 Units Oral DAILY    cilostazoL (PLETAL) tablet 100 mg  100 mg Oral ACB&D    clopidogreL (PLAVIX) tablet 75 mg  75 mg Oral DAILY AFTER BREAKFAST    cyanocobalamin (VITAMIN B12) tablet 100 mcg  100 mcg Oral DAILY    DULoxetine (CYMBALTA) capsule 120 mg  120 mg Oral DAILY    nitroglycerin (NITROSTAT) tablet 0.4 mg  0.4 mg SubLINGual Q5MIN PRN    pantoprazole (PROTONIX) tablet 40 mg  40 mg Oral ACB    polyethylene glycol (MIRALAX) packet 17 g  17 g Oral DAILY PRN  senna-docusate (PERICOLACE) 8.6-50 mg per tablet 1 Tablet  1 Tablet Oral QHS    albuterol-ipratropium (DUO-NEB) 2.5 MG-0.5 MG/3 ML  3 mL Nebulization Q6H PRN          LAB AND IMAGING FINDINGS:     Lab Results   Component Value Date/Time    WBC 5.2 01/21/2022 06:49 AM    HGB 8.0 (L) 01/21/2022 06:49 AM    PLATELET 829 86/74/3376 06:49 AM     Lab Results   Component Value Date/Time    Sodium 134 (L) 01/21/2022 06:49 AM    Potassium 3.8 01/21/2022 06:49 AM    Chloride 104 01/21/2022 06:49 AM    CO2 24 01/21/2022 06:49 AM    BUN 50 (H) 01/21/2022 06:49 AM    Creatinine 1.73 (H) 01/21/2022 06:49 AM    Calcium 9.1 01/21/2022 06:49 AM    Magnesium 2.1 01/17/2022 06:17 AM    Phosphorus 4.1 01/21/2022 06:49 AM      Lab Results   Component Value Date/Time    Alk. phosphatase 84 01/15/2022 10:13 AM    Protein, total 6.2 (L) 01/15/2022 10:13 AM    Albumin 3.4 (L) 01/21/2022 06:49 AM    Globulin 3.0 01/15/2022 10:13 AM     Lab Results   Component Value Date/Time    INR 1.1 03/18/2020 09:09 AM    Prothrombin time 11.0 03/18/2020 09:09 AM    aPTT 25.6 04/05/2021 07:22 PM      Lab Results   Component Value Date/Time    Iron 45 01/13/2022 03:45 AM    TIBC 263 01/13/2022 03:45 AM    Iron % saturation 17 (L) 01/13/2022 03:45 AM    Ferritin 156 12/14/2021 04:40 AM      No results found for: PH, PCO2, PO2  No components found for: [place]   Lab Results   Component Value Date/Time    CK 75 07/11/2019 09:35 AM    CK - MB 2.7 07/11/2019 09:35 AM                Total time: 70 min  Counseling / coordination time, spent as noted above: 60 min  > 50% counseling / coordination?: y    Prolonged service was provided for  []30 min   []75 min in face to face time in the presence of the patient, spent as noted above. Time Start:   Time End:   Note: this can only be billed with 25106 (initial) or 04191 (follow up). If multiple start / stop times, list each separately.

## 2022-01-21 NOTE — CONSULTS
Patient seen, chart reviewed, note dictated.     #[id number]    Jaxson Raman MD  Hem/Onc    Case d/w Niece Sharon Dance

## 2022-01-22 LAB
ALBUMIN SERPL-MCNC: 3.8 G/DL (ref 3.5–5)
ANION GAP SERPL CALC-SCNC: 7 MMOL/L (ref 5–15)
BUN SERPL-MCNC: 61 MG/DL (ref 6–20)
BUN/CREAT SERPL: 28 (ref 12–20)
CALCIUM SERPL-MCNC: 9.8 MG/DL (ref 8.5–10.1)
CHLORIDE SERPL-SCNC: 102 MMOL/L (ref 97–108)
CO2 SERPL-SCNC: 25 MMOL/L (ref 21–32)
COMMENT, HOLDF: NORMAL
CREAT SERPL-MCNC: 2.19 MG/DL (ref 0.55–1.02)
GLUCOSE BLD STRIP.AUTO-MCNC: 153 MG/DL (ref 65–117)
GLUCOSE BLD STRIP.AUTO-MCNC: 177 MG/DL (ref 65–117)
GLUCOSE BLD STRIP.AUTO-MCNC: 190 MG/DL (ref 65–117)
GLUCOSE BLD STRIP.AUTO-MCNC: 198 MG/DL (ref 65–117)
GLUCOSE SERPL-MCNC: 188 MG/DL (ref 65–100)
PHOSPHATE SERPL-MCNC: 3.9 MG/DL (ref 2.6–4.7)
POTASSIUM SERPL-SCNC: 4.2 MMOL/L (ref 3.5–5.1)
SAMPLES BEING HELD,HOLD: NORMAL
SERVICE CMNT-IMP: ABNORMAL
SODIUM SERPL-SCNC: 134 MMOL/L (ref 136–145)

## 2022-01-22 PROCEDURE — 94760 N-INVAS EAR/PLS OXIMETRY 1: CPT

## 2022-01-22 PROCEDURE — 74011250636 HC RX REV CODE- 250/636: Performed by: HOSPITALIST

## 2022-01-22 PROCEDURE — 74011636637 HC RX REV CODE- 636/637: Performed by: HOSPITALIST

## 2022-01-22 PROCEDURE — 65660000001 HC RM ICU INTERMED STEPDOWN

## 2022-01-22 PROCEDURE — 80069 RENAL FUNCTION PANEL: CPT

## 2022-01-22 PROCEDURE — 77010033678 HC OXYGEN DAILY

## 2022-01-22 PROCEDURE — 36415 COLL VENOUS BLD VENIPUNCTURE: CPT

## 2022-01-22 PROCEDURE — 82962 GLUCOSE BLOOD TEST: CPT

## 2022-01-22 PROCEDURE — 74011250637 HC RX REV CODE- 250/637: Performed by: HOSPITALIST

## 2022-01-22 PROCEDURE — 74011250637 HC RX REV CODE- 250/637: Performed by: NURSE PRACTITIONER

## 2022-01-22 RX ORDER — BUMETANIDE 1 MG/1
1 TABLET ORAL DAILY
Status: DISCONTINUED | OUTPATIENT
Start: 2022-01-23 | End: 2022-01-25

## 2022-01-22 RX ADMIN — DEXAMETHASONE 6 MG: 4 TABLET ORAL at 10:13

## 2022-01-22 RX ADMIN — CLOPIDOGREL 75 MG: 75 TABLET, FILM COATED ORAL at 10:14

## 2022-01-22 RX ADMIN — DILTIAZEM HYDROCHLORIDE 30 MG: 30 TABLET, FILM COATED ORAL at 06:59

## 2022-01-22 RX ADMIN — ATORVASTATIN CALCIUM 40 MG: 40 TABLET, FILM COATED ORAL at 22:21

## 2022-01-22 RX ADMIN — SENNOSIDES AND DOCUSATE SODIUM 1 TABLET: 50; 8.6 TABLET ORAL at 22:21

## 2022-01-22 RX ADMIN — CARVEDILOL 12.5 MG: 12.5 TABLET, FILM COATED ORAL at 17:56

## 2022-01-22 RX ADMIN — PANTOPRAZOLE SODIUM 40 MG: 40 TABLET, DELAYED RELEASE ORAL at 06:59

## 2022-01-22 RX ADMIN — CILOSTAZOL 100 MG: 50 TABLET ORAL at 06:59

## 2022-01-22 RX ADMIN — ARIPIPRAZOLE 20 MG: 5 TABLET ORAL at 13:16

## 2022-01-22 RX ADMIN — DILTIAZEM HYDROCHLORIDE 30 MG: 30 TABLET, FILM COATED ORAL at 17:56

## 2022-01-22 RX ADMIN — VITAM B12 100 MCG: 100 TAB at 10:14

## 2022-01-22 RX ADMIN — DULOXETINE 120 MG: 60 CAPSULE, DELAYED RELEASE ORAL at 10:14

## 2022-01-22 RX ADMIN — CARBIDOPA AND LEVODOPA 2 TABLET: 25; 100 TABLET ORAL at 22:21

## 2022-01-22 RX ADMIN — CARVEDILOL 12.5 MG: 12.5 TABLET, FILM COATED ORAL at 10:13

## 2022-01-22 RX ADMIN — GUAIFENESIN 600 MG: 600 TABLET, EXTENDED RELEASE ORAL at 10:14

## 2022-01-22 RX ADMIN — CILOSTAZOL 100 MG: 50 TABLET ORAL at 17:56

## 2022-01-22 RX ADMIN — Medication 1000 UNITS: at 10:13

## 2022-01-22 RX ADMIN — GUAIFENESIN 600 MG: 600 TABLET, EXTENDED RELEASE ORAL at 22:21

## 2022-01-22 RX ADMIN — Medication 2 UNITS: at 13:16

## 2022-01-22 RX ADMIN — DILTIAZEM HYDROCHLORIDE 30 MG: 30 TABLET, FILM COATED ORAL at 13:16

## 2022-01-22 RX ADMIN — CARBIDOPA AND LEVODOPA 2 TABLET: 25; 100 TABLET ORAL at 13:34

## 2022-01-22 RX ADMIN — Medication 2 UNITS: at 18:09

## 2022-01-22 RX ADMIN — CARBIDOPA AND LEVODOPA 2 TABLET: 25; 100 TABLET ORAL at 10:13

## 2022-01-22 RX ADMIN — Medication 2 UNITS: at 10:14

## 2022-01-22 RX ADMIN — CARBIDOPA AND LEVODOPA 2 TABLET: 25; 100 TABLET ORAL at 17:56

## 2022-01-22 NOTE — PROGRESS NOTES
[address] [facility] Adult  Hospitalist Group                                                                                          Hospitalist Progress Note  Dong Tidwell MD  Answering service: [phone] OR [phone] from in house phone        Date of Service:  2022  NAME:  Clarence Haro  :  1940  MRN:  [id number]      Admission Summary:   [de-identified] yo female who was discharged from hospital on 1/15 after being admitted for CHF, Anemia/AFib/MORENO. Patient was on diuretics but then held on  due to hypotension. Pt had anemia and given 1 U PRBCs on . Pt's Eliquis stopped due to concern for bleeding. Patient's diuretics were not restarted and discharged to [facility]. Patient sent back to ER due to SOB. Interval history / Subjective:     Patient seen and examined    States that her breathing is OK  She said she cannot believe that she tested positive for COVID - I told her that we will provide a copy of test result     Assessment & Plan:     Atrial Fib. - Coreg  12.5 mg bid,on Cardizem IR 30 mg tid  - Recently taken off Apixaban  due to h/o GIB. EGD and colonoscopy on 2021 negative.  -Cardiology recommendation appreciated:PPM/AVN if rate difficult to control with medications and watchman as OP if unable to resume Apixaban   Will reassess with activity    Acute respiratory failure with hypoxia due to Pulmonary edema-stable  Acute on chronic systolic CHF NYHA III  CAD s/p CABG/stent-c/w Plavix/Statin/BB  Hypertension with intermittent hypotension which had required IV boluses and albumin. BP meds being adjusted. -- Bumex held . ON coreg, no ACEI/ARBs due to CKD/hypotension  --CXR on  showed resolution of the pulmonary edema. Minimal peripheral edema on exam.    Chronic anemia,macrocytic. Etiology not clearyesenia multifactorial: nutritional, CKD? BM disorder. No overt GIB except when she had duodenal ulcer in . She needs further work up to delineate the cause of the anemia as this has become barrier to her being on anticoagulation for secondary stroke prevention. --She got blood on 1/11/2022 and 4/2021  --Stool studies have been negative for blood. --Recent EGD and colonoscopy without evidence of bleeding,ulcer etc except incidental findings of hiatal hernia,polyps. -Pending B12, Folate,SPEP,UPEP,IF  --Consult hematology       Drowsiness/acute metabolic encephalopathy-resolved  History of left lacunar infarct/acute CVA  history of left carotid stenosis s/p CEA  -ABG unremarkable  -Noted between 04/2021 to 11/2021 Buspar/neurontin/abilify was added to Cymbalta. Per previous hospitalist, discontinued buspar and night time neurontin      MORENO on CKD 4-diuretics held-Appreciate nephrology    Parkinson's-c/w sinemet     Multiple hospitalizations. 5 admissions since 11/202. palliative care seen    CoVID +,chronic hypoxia: will give her decadron 6 mg x10 days. -         Code status: DNR   DVT prophylaxis: SCDs        Care Plan discussed with: Yang Beltran. Anticipated Disposition: SNF/LTC       Hospital Problems  Date Reviewed: 1/17/2022          Codes Class Noted POA    * (Principal) Pulmonary edema ICD-10-CM: J81.1  ICD-9-CM: 423  1/16/2022 Yes        Atrial fibrillation with RVR ([facility] ICD-10-CM: I48.91  ICD-9-CM: 427.31  1/16/2022 Unknown        Acute respiratory failure with hypoxia (HCC) ICD-10-CM: J96.01  ICD-9-CM: 518.81  1/16/2022 Unknown        Acute on chronic systolic and diastolic heart failure, NYHA class 4 (HCC) ICD-10-CM: I50.43  ICD-9-CM: 428.43  1/16/2022 Unknown        CHF exacerbation (HCC) ICD-10-CM: I50.9  ICD-9-CM: 428.0  1/15/2022 Unknown        Left bundle branch block ICD-10-CM: I44.7  ICD-9-CM: 426.3  1/1/2022 Unknown                Review of Systems:   A comprehensive review of systems was negative except for that written in the HPI. Vital Signs:    Last 24hrs VS reviewed since prior progress note.  Most recent are:  Visit Vitals  BP (!) 145/72 (BP 1 Location: Left upper arm, BP Patient Position: Lying)   Pulse (!) 102   Temp 98.1 °F (36.7 °C)   Resp 21   Ht 5' 5\" (1.651 m)   Wt 73.5 kg (162 lb)   SpO2 98%   BMI 26.96 kg/m²         Intake/Output Summary (Last 24 hours) at 1/22/2022 1755  Last data filed at 1/21/2022 2214  Gross per 24 hour   Intake    Output 300 ml   Net -300 ml        Physical Examination:     I had a face to face encounter with this patient and independently examined them on 1/22/2022 as outlined below:          Constitutional:  No acute distress, cooperative, pleasant    ENT:  Oral mucosa moist, oropharynx benign. Resp:  On oxygen via NC.decreased BS with rales at bases   CV:  irregularly irregular ,normal rate at rest     GI:  Soft, non distended, non tender. normoactive bowel sounds,     Musculoskeletal: Trace  edema    Neurologic:  Moves all extremities. AAOx3, CN II-XII reviewed            Data Review:    Review and/or order of clinical lab test  Review and/or order of tests in the radiology section of CPT  Review and/or order of tests in the medicine section of CPT      Labs:     Recent Labs     01/21/22  0649 01/20/22  0648   WBC 5.2 4.1   HGB 8.0* 7.4*   HCT 26.1* 23.2*    202     Recent Labs     01/22/22  0418 01/21/22  0649 01/20/22  0648   * 134* 138   K 4.2 3.8 3.7    104 106   CO2 25 24 27   BUN 61* 50* 52*   CREA 2.19* 1.73* 1.65*   * 102* 91   CA 9.8 9.1 8.7   PHOS 3.9 4.1  --      Recent Labs     01/22/22  0418 01/21/22  0649   ALB 3.8 3.4*     No results for input(s): INR, PTP, APTT, INREXT, INREXT in the last 72 hours. No results for input(s): FE, TIBC, PSAT, FERR in the last 72 hours. Lab Results   Component Value Date/Time    Folate 7.8 12/14/2021 04:40 AM      No results for input(s): PH, PCO2, PO2 in the last 72 hours. No results for input(s): CPK, CKNDX, TROIQ in the last 72 hours.     No lab exists for component: CPKMB  Lab Results   Component Value Date/Time    Cholesterol, total 148 09/23/2020 04:27 AM    HDL Cholesterol 52 09/23/2020 04:27 AM    LDL, calculated 83.8 09/23/2020 04:27 AM    Triglyceride 61 09/23/2020 04:27 AM    CHOL/HDL Ratio 2.8 09/23/2020 04:27 AM     Lab Results   Component Value Date/Time    Glucose (POC) 198 (H) 01/22/2022 05:30 PM    Glucose (POC) 190 (H) 01/22/2022 12:10 PM    Glucose (POC) 177 (H) 01/22/2022 08:21 AM    Glucose (POC) 249 (H) 01/21/2022 09:28 PM    Glucose (POC) 202 (H) 01/21/2022 04:48 PM     Lab Results   Component Value Date/Time    Color YELLOW/STRAW 12/30/2021 08:23 PM    Appearance CLOUDY (A) 12/30/2021 08:23 PM    Specific gravity 1.017 12/30/2021 08:23 PM    pH (UA) 6.0 12/30/2021 08:23 PM    Protein 100 (A) 12/30/2021 08:23 PM    Glucose Negative 12/30/2021 08:23 PM    Ketone Negative 12/30/2021 08:23 PM    Bilirubin Negative 12/30/2021 08:23 PM    Urobilinogen 1.0 12/30/2021 08:23 PM    Nitrites Negative 12/30/2021 08:23 PM    Leukocyte Esterase MODERATE (A) 12/30/2021 08:23 PM    Epithelial cells FEW 12/30/2021 08:23 PM    Bacteria 1+ (A) 12/30/2021 08:23 PM    WBC 0-4 12/30/2021 08:23 PM    RBC 0-5 12/30/2021 08:23 PM         Medications Reviewed:     Current Facility-Administered Medications   Medication Dose Route Frequency    dexAMETHasone (DECADRON) tablet 6 mg  6 mg Oral DAILY    carvediloL (COREG) tablet 12.5 mg  12.5 mg Oral BID WITH MEALS    guaiFENesin ER (MUCINEX) tablet 600 mg  600 mg Oral Q12H    dilTIAZem IR (CARDIZEM) tablet 30 mg  30 mg Oral TIDAC    [Held by provider] bumetanide (BUMEX) tablet 1 mg  1 mg Oral BID    busPIRone (BUSPAR) tablet 5 mg  5 mg Oral TID PRN    insulin lispro (HUMALOG) injection   SubCUTAneous AC&HS    glucose chewable tablet 16 g  4 Tablet Oral PRN    dextrose (D50W) injection syrg 12.5-25 g  12.5-25 g IntraVENous PRN    glucagon (GLUCAGEN) injection 1 mg  1 mg IntraMUSCular PRN    ARIPiprazole (ABILIFY) tablet 20 mg  20 mg Oral DAILY    atorvastatin (LIPITOR) tablet 40 mg  40 mg Oral QHS    carbidopa-levodopa (SINEMET)  mg per tablet 2 Tablet  2 Tablet Oral QID    cholecalciferol (VITAMIN D3) (1000 Units /25 mcg) tablet 1,000 Units  1,000 Units Oral DAILY    cilostazoL (PLETAL) tablet 100 mg  100 mg Oral ACB&D    clopidogreL (PLAVIX) tablet 75 mg  75 mg Oral DAILY AFTER BREAKFAST    cyanocobalamin (VITAMIN B12) tablet 100 mcg  100 mcg Oral DAILY    DULoxetine (CYMBALTA) capsule 120 mg  120 mg Oral DAILY    nitroglycerin (NITROSTAT) tablet 0.4 mg  0.4 mg SubLINGual Q5MIN PRN    pantoprazole (PROTONIX) tablet 40 mg  40 mg Oral ACB    polyethylene glycol (MIRALAX) packet 17 g  17 g Oral DAILY PRN    senna-docusate (PERICOLACE) 8.6-50 mg per tablet 1 Tablet  1 Tablet Oral QHS    albuterol-ipratropium (DUO-NEB) 2.5 MG-0.5 MG/3 ML  3 mL Nebulization Q6H PRN     ______________________________________________________________________  EXPECTED LENGTH OF STAY: 3d 19h  ACTUAL LENGTH OF STAY:          6                 Raquel Henry MD

## 2022-01-22 NOTE — CONSULTS
[address]    Name:  Coral Stevenson  MR#:  [id number]  :  1940  ACCOUNT #:  [de-identified]  DATE OF SERVICE:  2022    HEMATOLOGY/ONCOLOGY CONSULT NOTE    REASON FOR ADMISSION:  Shortness of breath. REASON FOR CONSULTATION:  Macrocytic anemia. HISTORY OF PRESENT ILLNESS:  The patient is a very charming 81-year-old woman. She is admitted for shortness of breath in the context of known CHF, chronic renal insufficiency and was found to have COVID. She has been closely followed by the hospitalist service since then. On admission, she was noted to have macrocytic anemia. This has been present during previous admissions and in March of last year she had anemia, with evaluation with B12 testing; B12 testing at that time was normal.    She does have a history of previous CVA, was taking Plavix on admission. She does have a history of tremor and has been taking carbidopa/levodopa. She has a median sternotomy scar and has a chart history of coronary artery bypass grafting for coronary artery disease, but she does not recollect that incident. She has undergone a left carotid endarterectomy by Dr. Hai Mckeon for a left carotid artery stenosis. She had been taking Eliquis, but this was held due to the concern for bleeding as the cause of her anemia; however, her stool test here has been positive both this month and last month. PAST MEDICAL HISTORY:  1. Coronary artery disease, status post CABG and prior stent, unclear vessel. 2.  Carotid artery stenosis, status post carotid endarterectomy. 3.  Movement disorder, on carbidopa/levodopa. 4.  Chart history of Parkinson's. 5.  Chronic renal insufficiency. 6.  Hypertension. 7.  Hyperlipidemia. 8.  Previous CVA as above. ALLERGIES:  NO KNOWN DRUG ALLERGIES. CURRENT MEDICATIONS:  In the hospital,  1. Abilify 20 mg daily. 2.  Lipitor 40 mg at bedtime. 3.  Bumex 1 mg twice daily.   4.  Carbidopa/levodopa 25/100 two tablets four times daily. 5.  Coreg 12.5 mg twice daily. 6.  Pletal 100 mg twice daily. 7.  Plavix 75 mg once daily. 8.  Vitamin B12, 100 mcg p.o. daily. 9.  Decadron 6 mg for the next nine days. 10.  Cardizem 30 mg three times daily. 11.  Cymbalta 120 mg daily. 12.  Mucinex 600 mg q.12 hours. 13.  Protonix 40 mg daily. 14.  Humalog, lispro four times daily sliding scale. 15.  Tanvi-Colace one p.o. at bedtime. SOCIAL HISTORY:  She is originally from [place]. She currently lives at an assisted living facility. Next of kin is a niece, Naida Dejesus  , [phone]. FAMILY HISTORY:  Reviewed and noncontributory. REVIEW OF SYSTEMS:  CONSTITUTIONAL:  No fevers or chills. HEENT:  No auditory or visual change. LYMPHATICS:  No lumps or bumps in the neck, underarms, or groin. CARDIOVASCULAR:  No chest pain or palpitations. PULMONARY:  As above. GASTROINTESTINAL:  No abdominal pain, diarrhea, or constipation. GENITOURINARY:  No dysuria or incontinence. SKIN:  No rash or changing mole. MUSCULOSKELETAL:  No red or swollen joints. NEUROPSYCHIATRIC:  She does have a left hand tremor. PHYSICAL EXAMINATION:  GENERAL:  Pleasant, in no acute distress. VITAL SIGNS:  /63, pulse 98, temperature 98.2, saturating 93% on room air. HEENT:  Sclerae anicteric. Oropharynx clear. NECK:  Supple without lymphadenopathy or thyromegaly. HEART:  Regular rate and rhythm without murmur, rub, or gallop. LUNGS:  Rhonchi at lung base bilaterally. ABDOMEN:  Nontender and nondistended. Normoactive bowel sounds. No hepatosplenomegaly. EXTREMITIES:  Left hand tremor. PSYCHIATRIC:  Normal mood and affect. Alert and oriented x3.     ASSESSMENT AND PLAN:  An 81-year-old woman noted to have macrocytic anemia, previously with normal B12, now presenting to the hospital for shortness of breath, found to have COVID in the context of congestive heart failure and chronic renal insufficiency, asked to see for macrocytic anemia. 1.  Macrocytic anemia:  I am suspicious that she has myelodysplastic syndrome. I have recommended a bone marrow biopsy, and I have talked with Dr. Carolina Gordon. I have this to be done on Monday before her leaving the hospital.  I am also happy to do this in my clinic if she leaves the hospital before then. I have given her my contact information and given her the option of seeing us in clinic. I have not signed her out to the weekend rounder, but I will be available here next Tuesday if she is still in the hospital.  Otherwise, I will see her in clinic to discuss her bone marrow biopsy results and make plans for further care which may include Procrit or if she has ring sideroblasts, luspatercept. Thank you for consult.       MD MARY BETH Tinsley/BINTA_SAMMY/BC_CAD  D:  01/21/2022 17:26  T:  01/21/2022 22:00  JOB #:  [id number]

## 2022-01-22 NOTE — PROGRESS NOTES
2000: report received from rashaun RN; assumed care of patient. 2030: pt. Noted to be in AF RV with HR sustained in 120-130 range. Provider notified. No changes. Pt. Resting comfortably with NAD.    0040: Bedside shift change report given to Festus Greco (oncoming nurse) by Abdelrahman Huddleston (offgoing nurse). Report included the following information SBAR, Kardex, Recent Results, and Alarm Parameters . Problem: Risk for Spread of Infection  Goal: Prevent transmission of infectious organism to others  Description: Prevent the transmission of infectious organisms to other patients, staff members, and visitors. Outcome: Progressing Towards Goal     Problem: Patient Education:  Go to Education Activity  Goal: Patient/Family Education  Outcome: Progressing Towards Goal     Problem: Falls - Risk of  Goal: *Absence of Falls  Description: Document Lettie Duverney Fall Risk and appropriate interventions in the flowsheet. Outcome: Progressing Towards Goal  Note: Fall Risk Interventions:  Mobility Interventions: Communicate number of staff needed for ambulation/transfer    Mentation Interventions: Adequate sleep, hydration, pain control    Medication Interventions: Patient to call before getting OOB    Elimination Interventions: Call light in reach              Problem: Patient Education: Go to Patient Education Activity  Goal: Patient/Family Education  Outcome: Progressing Towards Goal     Problem: Patient Education: Go to Patient Education Activity  Goal: Patient/Family Education  Outcome: Progressing Towards Goal     Problem: Patient Education: Go to Patient Education Activity  Goal: Patient/Family Education  Outcome: Progressing Towards Goal     Problem: Discharge Planning  Goal: *Discharge to safe environment  Outcome: Progressing Towards Goal     Problem: Pressure Injury - Risk of  Goal: *Prevention of pressure injury  Description: Document Gregorio Scale and appropriate interventions in the flowsheet.   Outcome: Progressing Towards Goal  Note: Pressure Injury Interventions:  Sensory Interventions: Assess changes in LOC    Moisture Interventions: Absorbent underpads    Activity Interventions: PT/OT evaluation    Mobility Interventions: PT/OT evaluation    Nutrition Interventions: Document food/fluid/supplement intake    Friction and Shear Interventions: Minimize layers                Problem: Patient Education: Go to Patient Education Activity  Goal: Patient/Family Education  Outcome: Progressing Towards Goal     Problem: Airway Clearance - Ineffective  Goal: Achieve or maintain patent airway  Outcome: Progressing Towards Goal     Problem: Gas Exchange - Impaired  Goal: Absence of hypoxia  Outcome: Progressing Towards Goal  Goal: Promote optimal lung function  Outcome: Progressing Towards Goal     Problem: Breathing Pattern - Ineffective  Goal: Ability to achieve and maintain a regular respiratory rate  Outcome: Progressing Towards Goal     Problem:  Body Temperature -  Risk of, Imbalanced  Goal: Ability to maintain a body temperature within defined limits  Outcome: Progressing Towards Goal  Goal: Will regain or maintain usual level of consciousness  Outcome: Progressing Towards Goal  Goal: Complications related to the disease process, condition or treatment will be avoided or minimized  Outcome: Progressing Towards Goal     Problem: Isolation Precautions - Risk of Spread of Infection  Goal: Prevent transmission of infectious organism to others  Outcome: Progressing Towards Goal     Problem: Nutrition Deficits  Goal: Optimize nutrtional status  Outcome: Progressing Towards Goal     Problem: Risk for Fluid Volume Deficit  Goal: Maintain normal heart rhythm  Outcome: Progressing Towards Goal  Goal: Maintain absence of muscle cramping  Outcome: Progressing Towards Goal  Goal: Maintain normal serum potassium, sodium, calcium, phosphorus, and pH  Outcome: Progressing Towards Goal     Problem: Loneliness or Risk for Loneliness  Goal: Demonstrate positive use of time alone when socialization is not possible  Outcome: Progressing Towards Goal     Problem: Fatigue  Goal: Verbalize increase energy and improved vitality  Outcome: Progressing Towards Goal     Problem: Patient Education: Go to Patient Education Activity  Goal: Patient/Family Education  Outcome: Progressing Towards Goal

## 2022-01-22 NOTE — PROGRESS NOTES
Nephrology Progress Note  Britni Collado  Date of Admission : 1/15/2022    CC:  Follow up for MORENO on CKD       Assessment and Plan     MORENO on CKD:  - diff: progressive disease vs CRS  - Cr rising. Holding diuretics   - daily labs     Afib with RVR  - per cards     CKD IV:  - baseline increasing, likely around 1.6 to 1.8 now     COVID-19 +    HTN:  - BP stable    Anemia:  - w/u underway     Encephalopathy:  - w/u per primary team    Acute on Chronic HFpEF  CAD, hx of CABG  - ECHO w/ EF 55-60%  - diuretics as above         Parkinsons  Hx of CVA  PAD w/ L CEA  AAA       Interval History:  Not examined in the room. COVID + on 1/20. Cr rising. No reported cp, sob, n/v/d. CBC pending. Hb was trending up. D/w attending      Current Medications: all current  Medications have been eviewed in EPIC  Review of Systems: Review of systems not obtained due to patient factors. Objective:  Vitals:    Vitals:    01/22/22 0405 01/22/22 0500 01/22/22 0553 01/22/22 0909   BP:  (!) 143/98  (!) 145/76   Pulse: 92 (!) 105 (!) 109 99   Resp:  18  21   Temp:  98.4 °F (36.9 °C)  98.3 °F (36.8 °C)   SpO2:  98%  94%   Weight:  73.5 kg (162 lb)     Height:         Intake and Output:  No intake/output data recorded. 01/20 1901 - 01/22 0700  In: 975 [P.O.:975]  Out: 2400 [Urine:2400]    Physical Examination:  Not examined in room due tpo COVID isolation     []    High complexity decision making was performed  []    Patient is at high-risk of decompensation with multiple organ involvement    Lab Data Personally Reviewed: I have reviewed all the pertinent labs, microbiology data and radiology studies during assessment.     Recent Labs     01/22/22  0418 01/21/22  0649 01/20/22  0648   * 134* 138   K 4.2 3.8 3.7    104 106   CO2 25 24 27   * 102* 91   BUN 61* 50* 52*   CREA 2.19* 1.73* 1.65*   CA 9.8 9.1 8.7   PHOS 3.9 4.1  --    ALB 3.8 3.4*  --      Recent Labs     01/21/22  0649 01/20/22  0648   WBC 5.2 4.1   HGB 8. 0* 7.4*   HCT 26.1* 23.2*    202     No results found for: SDES  Lab Results   Component Value Date/Time    Culture result: NO GROWTH 5 DAYS 01/07/2022 08:37 PM    Culture result: MRSA NOT PRESENT 12/10/2021 06:53 AM    Culture result:  12/10/2021 06:53 AM     Screening of patient nares for MRSA is for surveillance purposes and, if positive, to facilitate isolation considerations in high risk settings. It is not intended for automatic decolonization interventions per se as regimens are not sufficiently effective to warrant routine use. Recent Results (from the past 24 hour(s))   GLUCOSE, POC    Collection Time: 01/21/22 11:54 AM   Result Value Ref Range    Glucose (POC) 181 (H) 65 - 117 mg/dL    Performed by Neva FAIRCHILD DIMER    Collection Time: 01/21/22 12:12 PM   Result Value Ref Range    D-dimer 2.59 (H) 0.00 - 0.65 mg/L FEU   URINE CULTURE HOLD SAMPLE    Collection Time: 01/21/22 12:15 PM    Specimen: Serum   Result Value Ref Range    Urine culture hold        Urine on hold in Microbiology dept for 2 days. If unpreserved urine is submitted, it cannot be used for addtional testing after 24 hours, recollection will be required.    GLUCOSE, POC    Collection Time: 01/21/22  4:48 PM   Result Value Ref Range    Glucose (POC) 202 (H) 65 - 117 mg/dL    Performed by [address] [place] [address], POC    Collection Time: 01/21/22  9:28 PM   Result Value Ref Range    Glucose (POC) 249 (H) 65 - 117 mg/dL    Performed by Guinevere Bence    RENAL FUNCTION PANEL    Collection Time: 01/22/22  4:18 AM   Result Value Ref Range    Sodium 134 (L) 136 - 145 mmol/L    Potassium 4.2 3.5 - 5.1 mmol/L    Chloride 102 97 - 108 mmol/L    CO2 25 21 - 32 mmol/L    Anion gap 7 5 - 15 mmol/L    Glucose 188 (H) 65 - 100 mg/dL    BUN 61 (H) 6 - 20 MG/DL    Creatinine 2.19 (H) 0.55 - 1.02 MG/DL    BUN/Creatinine ratio 28 (H) 12 - 20      GFR est AA 26 (L) >60 ml/min/1.73m2    GFR est non-AA 22 (L) >60 ml/min/1.73m2    Calcium 9.8 8.5 - 10.1 MG/DL    Phosphorus 3.9 2.6 - 4.7 MG/DL    Albumin 3.8 3.5 - 5.0 g/dL   SAMPLES BEING HELD    Collection Time: 01/22/22  4:18 AM   Result Value Ref Range    SAMPLES BEING HELD [id number]     COMMENT        Add-on orders for these samples will be processed based on acceptable specimen integrity and analyte stability, which may vary by analyte. GLUCOSE, POC    Collection Time: 01/22/22  8:21 AM   Result Value Ref Range    Glucose (POC) 177 (H) 65 - 117 mg/dL    Performed by Worley Dakin ([address] [facility])                  Beverley Downing MD  [address]  Phone - [phone]   Fax - [phone]  www. [email or web address]

## 2022-01-23 LAB
ALBUMIN SERPL-MCNC: 3.7 G/DL (ref 3.5–5)
ANION GAP SERPL CALC-SCNC: 5 MMOL/L (ref 5–15)
BUN SERPL-MCNC: 66 MG/DL (ref 6–20)
BUN/CREAT SERPL: 32 (ref 12–20)
CALCIUM SERPL-MCNC: 9.5 MG/DL (ref 8.5–10.1)
CHLORIDE SERPL-SCNC: 101 MMOL/L (ref 97–108)
CO2 SERPL-SCNC: 26 MMOL/L (ref 21–32)
COMMENT, HOLDF: NORMAL
CREAT SERPL-MCNC: 2.06 MG/DL (ref 0.55–1.02)
GLUCOSE BLD STRIP.AUTO-MCNC: 170 MG/DL (ref 65–117)
GLUCOSE BLD STRIP.AUTO-MCNC: 197 MG/DL (ref 65–117)
GLUCOSE BLD STRIP.AUTO-MCNC: 220 MG/DL (ref 65–117)
GLUCOSE BLD STRIP.AUTO-MCNC: 272 MG/DL (ref 65–117)
GLUCOSE SERPL-MCNC: 178 MG/DL (ref 65–100)
PHOSPHATE SERPL-MCNC: 4.3 MG/DL (ref 2.6–4.7)
POTASSIUM SERPL-SCNC: 4.2 MMOL/L (ref 3.5–5.1)
SAMPLES BEING HELD,HOLD: NORMAL
SERVICE CMNT-IMP: ABNORMAL
SODIUM SERPL-SCNC: 132 MMOL/L (ref 136–145)

## 2022-01-23 PROCEDURE — 74011250637 HC RX REV CODE- 250/637: Performed by: NURSE PRACTITIONER

## 2022-01-23 PROCEDURE — 82962 GLUCOSE BLOOD TEST: CPT

## 2022-01-23 PROCEDURE — 74011250636 HC RX REV CODE- 250/636: Performed by: HOSPITALIST

## 2022-01-23 PROCEDURE — 77030038269 HC DRN EXT URIN PURWCK BARD -A

## 2022-01-23 PROCEDURE — 36415 COLL VENOUS BLD VENIPUNCTURE: CPT

## 2022-01-23 PROCEDURE — 74011636637 HC RX REV CODE- 636/637: Performed by: HOSPITALIST

## 2022-01-23 PROCEDURE — 74011250637 HC RX REV CODE- 250/637: Performed by: HOSPITALIST

## 2022-01-23 PROCEDURE — 94760 N-INVAS EAR/PLS OXIMETRY 1: CPT

## 2022-01-23 PROCEDURE — 65660000000 HC RM CCU STEPDOWN

## 2022-01-23 PROCEDURE — 80069 RENAL FUNCTION PANEL: CPT

## 2022-01-23 PROCEDURE — 77010033678 HC OXYGEN DAILY

## 2022-01-23 RX ADMIN — CARBIDOPA AND LEVODOPA 2 TABLET: 25; 100 TABLET ORAL at 09:56

## 2022-01-23 RX ADMIN — CARVEDILOL 12.5 MG: 12.5 TABLET, FILM COATED ORAL at 18:26

## 2022-01-23 RX ADMIN — DILTIAZEM HYDROCHLORIDE 30 MG: 30 TABLET, FILM COATED ORAL at 18:26

## 2022-01-23 RX ADMIN — DULOXETINE 120 MG: 60 CAPSULE, DELAYED RELEASE ORAL at 09:55

## 2022-01-23 RX ADMIN — Medication 3 UNITS: at 21:24

## 2022-01-23 RX ADMIN — DILTIAZEM HYDROCHLORIDE 30 MG: 30 TABLET, FILM COATED ORAL at 14:02

## 2022-01-23 RX ADMIN — CARVEDILOL 12.5 MG: 12.5 TABLET, FILM COATED ORAL at 09:55

## 2022-01-23 RX ADMIN — CARBIDOPA AND LEVODOPA 2 TABLET: 25; 100 TABLET ORAL at 21:24

## 2022-01-23 RX ADMIN — GUAIFENESIN 600 MG: 600 TABLET, EXTENDED RELEASE ORAL at 09:58

## 2022-01-23 RX ADMIN — CARBIDOPA AND LEVODOPA 2 TABLET: 25; 100 TABLET ORAL at 14:02

## 2022-01-23 RX ADMIN — CILOSTAZOL 100 MG: 50 TABLET ORAL at 18:26

## 2022-01-23 RX ADMIN — DILTIAZEM HYDROCHLORIDE 30 MG: 30 TABLET, FILM COATED ORAL at 06:37

## 2022-01-23 RX ADMIN — CILOSTAZOL 100 MG: 50 TABLET ORAL at 06:36

## 2022-01-23 RX ADMIN — Medication 2 UNITS: at 14:02

## 2022-01-23 RX ADMIN — Medication 1000 UNITS: at 09:55

## 2022-01-23 RX ADMIN — ATORVASTATIN CALCIUM 40 MG: 40 TABLET, FILM COATED ORAL at 21:24

## 2022-01-23 RX ADMIN — DEXAMETHASONE 6 MG: 4 TABLET ORAL at 09:55

## 2022-01-23 RX ADMIN — VITAM B12 100 MCG: 100 TAB at 09:59

## 2022-01-23 RX ADMIN — Medication 3 UNITS: at 18:26

## 2022-01-23 RX ADMIN — GUAIFENESIN 600 MG: 600 TABLET, EXTENDED RELEASE ORAL at 21:00

## 2022-01-23 RX ADMIN — Medication 2 UNITS: at 09:57

## 2022-01-23 RX ADMIN — CARBIDOPA AND LEVODOPA 2 TABLET: 25; 100 TABLET ORAL at 18:26

## 2022-01-23 RX ADMIN — PANTOPRAZOLE SODIUM 40 MG: 40 TABLET, DELAYED RELEASE ORAL at 06:36

## 2022-01-23 RX ADMIN — ARIPIPRAZOLE 20 MG: 5 TABLET ORAL at 09:55

## 2022-01-23 RX ADMIN — CLOPIDOGREL 75 MG: 75 TABLET, FILM COATED ORAL at 09:55

## 2022-01-23 NOTE — PROGRESS NOTES
1930:Bedside and Verbal shift change report given to Shadia (oncoming nurse) by Theora Lesch (offgoing nurse). Report included the following information SBAR, Kardex, Intake/Output, MAR, Accordion and Recent Results. Pt. Assessed, VSS. Uneventful shift. Pt w/o complaints. 0730: Bedside and Verbal shift change report given to Shadia (oncoming nurse) by Theora Lesch (offgoing nurse). Report included the following information SBAR, Kardex, Intake/Output, MAR, Accordion and Recent Results.

## 2022-01-23 NOTE — PROGRESS NOTES
[facility] [facility] Adult  Hospitalist Group                                                                                          Hospitalist Progress Note  Marshal Stewart MD  Answering service: [phone] OR [phone] from in house phone        Date of Service:  2022  NAME:  Damari Archer  :  1940  MRN:  [id number]      Admission Summary:   [de-identified] yo female who was discharged from hospital on 1/15 after being admitted for CHF, Anemia/AFib/MORENO. Patient was on diuretics but then held on  due to hypotension. Pt had anemia and given 1 U PRBCs on . Pt's Eliquis stopped due to concern for bleeding. Patient's diuretics were not restarted and discharged to [facility]. Patient sent back to ER due to SOB. Interval history / Subjective:     Patient seen and examined    States that she is doing OK     Assessment & Plan:     Atrial Fib. - Coreg  12.5 mg bid,on Cardizem IR 30 mg tid  - Recently taken off Apixaban  due to h/o GIB. EGD and colonoscopy on 2021 negative.  -Cardiology recommendation appreciated:PPM/AVN if rate difficult to control with medications and watchman as OP if unable to resume Apixaban   Will reassess with activity tomorrow with PT    Pulmonary edema-resolved  Acute on chronic systolic CHF NYHA III  CAD s/p CABG/stent-c/w Plavix/Statin/BB  Hypertension    -- Bumex held . ON coreg, no ACEI/ARBs due to CKD/hypotension  --CXR on  showed resolution of the pulmonary edema. Minimal peripheral edema on exam.    Chronic anemia,macrocytic. Etiology not clearyesenia multifactorial: nutritional, CKD? BM disorder. No overt GIB except when she had duodenal ulcer in . She needs further work up to delineate the cause of the anemia as this has become barrier to her being on anticoagulation for secondary stroke prevention. --She got blood on 2022 and 2021  --Stool studies have been negative for blood.   --Recent EGD and colonoscopy without evidence of bleeding,ulcer etc except incidental findings of hiatal hernia,polyps. -Pending B12, Folate,SPEP,UPEP,IF  --Consult hematology -plan for bone marrow on monday      Drowsiness/acute metabolic encephalopathy-resolved  History of left lacunar infarct/acute CVA  history of left carotid stenosis s/p CEA  -ABG unremarkable  -Noted between 04/2021 to 11/2021 Buspar/neurontin/abilify was added to Cymbalta. Per previous hospitalist, discontinued buspar and night time neurontin      MORENO on CKD 4-diuretics held-Appreciate nephrology    Parkinson's-c/w sinemet     Multiple hospitalizations. 5 admissions since 11/202. palliative care seen    CoVID +,chronic hypoxia: decadron 6 mg x10 days. Code status: DNR   DVT prophylaxis: SCDs        Care Plan discussed with: Severa Fergusson. Anticipated Disposition: SNF/LTC       Hospital Problems  Date Reviewed: 1/17/2022          Codes Class Noted POA    * (Principal) Pulmonary edema ICD-10-CM: J81.1  ICD-9-CM: 239  1/16/2022 Yes        Atrial fibrillation with RVR ([facility] ICD-10-CM: I48.91  ICD-9-CM: 427.31  1/16/2022 Unknown        Acute respiratory failure with hypoxia (HCC) ICD-10-CM: J96.01  ICD-9-CM: 518.81  1/16/2022 Unknown        Acute on chronic systolic and diastolic heart failure, NYHA class 4 (HCC) ICD-10-CM: I50.43  ICD-9-CM: 428.43  1/16/2022 Unknown        CHF exacerbation (HCC) ICD-10-CM: I50.9  ICD-9-CM: 428.0  1/15/2022 Unknown        Left bundle branch block ICD-10-CM: I44.7  ICD-9-CM: 426.3  1/1/2022 Unknown                Review of Systems:   A comprehensive review of systems was negative except for that written in the HPI. Vital Signs:    Last 24hrs VS reviewed since prior progress note.  Most recent are:  Visit Vitals  BP (!) 145/83 (BP 1 Location: Left upper arm, BP Patient Position: At rest)   Pulse 99   Temp 98.1 °F (36.7 °C)   Resp 18   Ht 5' 5\" (1.651 m)   Wt 73.5 kg (162 lb)   SpO2 95%   BMI 26.96 kg/m²       No intake or output data in the 24 hours ending 01/23/22 0914 Physical Examination:     I had a face to face encounter with this patient and independently examined them on 1/23/2022 as outlined below:          Constitutional:  No acute distress, cooperative, pleasant    ENT:  Oral mucosa moist, oropharynx benign. Resp:  On oxygen via NC.decreased BS at bases   CV:  irregularly irregular ,normal rate at rest     GI:  Soft, non distended, non tender. normoactive bowel sounds,     Musculoskeletal: Trace  edema    Neurologic:  Moves all extremities. AAOx3, CN II-XII reviewed            Data Review:    Review and/or order of clinical lab test  Review and/or order of tests in the medicine section of [facility]      Labs:     Recent Labs     01/21/22  0649   WBC 5.2   HGB 8.0*   HCT 26.1*        Recent Labs     01/23/22  0413 01/22/22  0418 01/21/22  0649   * 134* 134*   K 4.2 4.2 3.8    102 104   CO2 26 25 24   BUN 66* 61* 50*   CREA 2.06* 2.19* 1.73*   * 188* 102*   CA 9.5 9.8 9.1   PHOS 4.3 3.9 4.1     Recent Labs     01/23/22  0413 01/22/22  0418 01/21/22  0649   ALB 3.7 3.8 3.4*     No results for input(s): INR, PTP, APTT, INREXT, INREXT in the last 72 hours. No results for input(s): FE, TIBC, PSAT, FERR in the last 72 hours. Lab Results   Component Value Date/Time    Folate 7.8 12/14/2021 04:40 AM      No results for input(s): PH, PCO2, PO2 in the last 72 hours. No results for input(s): CPK, CKNDX, TROIQ in the last 72 hours.     No lab exists for component: CPKMB  Lab Results   Component Value Date/Time    Cholesterol, total 148 09/23/2020 04:27 AM    HDL Cholesterol 52 09/23/2020 04:27 AM    LDL, calculated 83.8 09/23/2020 04:27 AM    Triglyceride 61 09/23/2020 04:27 AM    CHOL/HDL Ratio 2.8 09/23/2020 04:27 AM     Lab Results   Component Value Date/Time    Glucose (POC) 170 (H) 01/23/2022 07:59 AM    Glucose (POC) 153 (H) 01/22/2022 09:38 PM    Glucose (POC) 198 (H) 01/22/2022 05:30 PM    Glucose (POC) 190 (H) 01/22/2022 12:10 PM    Glucose (POC) 177 (H) 01/22/2022 08:21 AM     Lab Results   Component Value Date/Time    Color YELLOW/STRAW 12/30/2021 08:23 PM    Appearance CLOUDY (A) 12/30/2021 08:23 PM    Specific gravity 1.017 12/30/2021 08:23 PM    pH (UA) 6.0 12/30/2021 08:23 PM    Protein 100 (A) 12/30/2021 08:23 PM    Glucose Negative 12/30/2021 08:23 PM    Ketone Negative 12/30/2021 08:23 PM    Bilirubin Negative 12/30/2021 08:23 PM    Urobilinogen 1.0 12/30/2021 08:23 PM    Nitrites Negative 12/30/2021 08:23 PM    Leukocyte Esterase MODERATE (A) 12/30/2021 08:23 PM    Epithelial cells FEW 12/30/2021 08:23 PM    Bacteria 1+ (A) 12/30/2021 08:23 PM    WBC 0-4 12/30/2021 08:23 PM    RBC 0-5 12/30/2021 08:23 PM         Medications Reviewed:     Current Facility-Administered Medications   Medication Dose Route Frequency    [Held by provider] bumetanide (BUMEX) tablet 1 mg  1 mg Oral DAILY    dexAMETHasone (DECADRON) tablet 6 mg  6 mg Oral DAILY    carvediloL (COREG) tablet 12.5 mg  12.5 mg Oral BID WITH MEALS    guaiFENesin ER (MUCINEX) tablet 600 mg  600 mg Oral Q12H    dilTIAZem IR (CARDIZEM) tablet 30 mg  30 mg Oral TIDAC    busPIRone (BUSPAR) tablet 5 mg  5 mg Oral TID PRN    insulin lispro (HUMALOG) injection   SubCUTAneous AC&HS    glucose chewable tablet 16 g  4 Tablet Oral PRN    dextrose (D50W) injection syrg 12.5-25 g  12.5-25 g IntraVENous PRN    glucagon (GLUCAGEN) injection 1 mg  1 mg IntraMUSCular PRN    ARIPiprazole (ABILIFY) tablet 20 mg  20 mg Oral DAILY    atorvastatin (LIPITOR) tablet 40 mg  40 mg Oral QHS    carbidopa-levodopa (SINEMET)  mg per tablet 2 Tablet  2 Tablet Oral QID    cholecalciferol (VITAMIN D3) (1000 Units /25 mcg) tablet 1,000 Units  1,000 Units Oral DAILY    cilostazoL (PLETAL) tablet 100 mg  100 mg Oral ACB&D    clopidogreL (PLAVIX) tablet 75 mg  75 mg Oral DAILY AFTER BREAKFAST    cyanocobalamin (VITAMIN B12) tablet 100 mcg  100 mcg Oral DAILY    DULoxetine (CYMBALTA) capsule 120 mg  120 mg Oral DAILY    nitroglycerin (NITROSTAT) tablet 0.4 mg  0.4 mg SubLINGual Q5MIN PRN    pantoprazole (PROTONIX) tablet 40 mg  40 mg Oral ACB    polyethylene glycol (MIRALAX) packet 17 g  17 g Oral DAILY PRN    senna-docusate (PERICOLACE) 8.6-50 mg per tablet 1 Tablet  1 Tablet Oral QHS    albuterol-ipratropium (DUO-NEB) 2.5 MG-0.5 MG/3 ML  3 mL Nebulization Q6H PRN     ______________________________________________________________________  EXPECTED LENGTH OF STAY: 3d 19h  ACTUAL LENGTH OF STAY:          7                 Lizeth Schwartz MD

## 2022-01-23 NOTE — PROGRESS NOTES
Bedside shift change report given to [facility] (oncoming nurse) by Pito Hastings (offgoing nurse). Report included the following information SBAR, Procedure Summary, Intake/Output, MAR, Recent Results and Med Rec Status.

## 2022-01-24 ENCOUNTER — APPOINTMENT (OUTPATIENT)
Dept: CT IMAGING | Age: 82
DRG: 291 | End: 2022-01-24
Attending: INTERNAL MEDICINE
Payer: MEDICARE

## 2022-01-24 LAB
ALBUMIN MFR UR ELPH: 77.5 %
ALBUMIN SERPL-MCNC: 3.3 G/DL (ref 3.5–5)
ALPHA1 GLOB MFR UR ELPH: 5.9 %
ALPHA2 GLOB 24H MFR UR ELPH: 3.8 %
ANION GAP SERPL CALC-SCNC: 8 MMOL/L (ref 5–15)
B-GLOBULIN 24H MFR UR ELPH: 9.6 %
BASOPHILS # BLD: 0 K/UL (ref 0–0.1)
BASOPHILS NFR BLD: 0 % (ref 0–1)
BUN SERPL-MCNC: 68 MG/DL (ref 6–20)
BUN/CREAT SERPL: 34 (ref 12–20)
CALCIUM SERPL-MCNC: 9.2 MG/DL (ref 8.5–10.1)
CHLORIDE SERPL-SCNC: 101 MMOL/L (ref 97–108)
CO2 SERPL-SCNC: 25 MMOL/L (ref 21–32)
CREAT SERPL-MCNC: 2 MG/DL (ref 0.55–1.02)
DIFFERENTIAL METHOD BLD: ABNORMAL
EOSINOPHIL # BLD: 0 K/UL (ref 0–0.4)
EOSINOPHIL NFR BLD: 0 % (ref 0–7)
ERYTHROCYTE [DISTWIDTH] IN BLOOD BY AUTOMATED COUNT: 13.3 % (ref 11.5–14.5)
GAMMA GLOB 24H MFR UR ELPH: 3.2 %
GLUCOSE BLD STRIP.AUTO-MCNC: 169 MG/DL (ref 65–117)
GLUCOSE BLD STRIP.AUTO-MCNC: 218 MG/DL (ref 65–117)
GLUCOSE BLD STRIP.AUTO-MCNC: 230 MG/DL (ref 65–117)
GLUCOSE BLD STRIP.AUTO-MCNC: 239 MG/DL (ref 65–117)
GLUCOSE SERPL-MCNC: 178 MG/DL (ref 65–100)
HCT VFR BLD AUTO: 24.5 % (ref 35–47)
HGB BLD-MCNC: 7.8 G/DL (ref 11.5–16)
IGA SERPL-MCNC: 149 MG/DL (ref 64–422)
IGG SERPL-MCNC: 467 MG/DL (ref 586–1602)
IGM SERPL-MCNC: 117 MG/DL (ref 26–217)
IMM GRANULOCYTES # BLD AUTO: 0 K/UL (ref 0–0.04)
IMM GRANULOCYTES NFR BLD AUTO: 1 % (ref 0–0.5)
LYMPHOCYTES # BLD: 0.2 K/UL (ref 0.8–3.5)
LYMPHOCYTES NFR BLD: 7 % (ref 12–49)
M PROTEIN MFR UR ELPH: NORMAL %
MCH RBC QN AUTO: 31.8 PG (ref 26–34)
MCHC RBC AUTO-ENTMCNC: 31.8 G/DL (ref 30–36.5)
MCV RBC AUTO: 100 FL (ref 80–99)
MONOCYTES # BLD: 0.1 K/UL (ref 0–1)
MONOCYTES NFR BLD: 4 % (ref 5–13)
NEUTS SEG # BLD: 3.2 K/UL (ref 1.8–8)
NEUTS SEG NFR BLD: 88 % (ref 32–75)
NRBC # BLD: 0 K/UL (ref 0–0.01)
NRBC BLD-RTO: 0 PER 100 WBC
PHOSPHATE SERPL-MCNC: 3.8 MG/DL (ref 2.6–4.7)
PLATELET # BLD AUTO: 233 K/UL (ref 150–400)
PLATELET COMMENTS,PCOM: ABNORMAL
PLEASE NOTE:, 133800: NORMAL
PMV BLD AUTO: 9 FL (ref 8.9–12.9)
POTASSIUM SERPL-SCNC: 4.3 MMOL/L (ref 3.5–5.1)
PROT PATTERN SERPL IFE-IMP: ABNORMAL
PROT UR-MCNC: 103.5 MG/DL
RBC # BLD AUTO: 2.45 M/UL (ref 3.8–5.2)
RBC MORPH BLD: ABNORMAL
RBC MORPH BLD: ABNORMAL
SERVICE CMNT-IMP: ABNORMAL
SODIUM SERPL-SCNC: 134 MMOL/L (ref 136–145)
WBC # BLD AUTO: 3.5 K/UL (ref 3.6–11)

## 2022-01-24 PROCEDURE — 65660000000 HC RM CCU STEPDOWN

## 2022-01-24 PROCEDURE — 36415 COLL VENOUS BLD VENIPUNCTURE: CPT

## 2022-01-24 PROCEDURE — 74011250637 HC RX REV CODE- 250/637: Performed by: NURSE PRACTITIONER

## 2022-01-24 PROCEDURE — 97535 SELF CARE MNGMENT TRAINING: CPT

## 2022-01-24 PROCEDURE — 80069 RENAL FUNCTION PANEL: CPT

## 2022-01-24 PROCEDURE — 74011636637 HC RX REV CODE- 636/637: Performed by: HOSPITALIST

## 2022-01-24 PROCEDURE — 74011250637 HC RX REV CODE- 250/637: Performed by: HOSPITALIST

## 2022-01-24 PROCEDURE — 85025 COMPLETE CBC W/AUTO DIFF WBC: CPT

## 2022-01-24 PROCEDURE — 97530 THERAPEUTIC ACTIVITIES: CPT

## 2022-01-24 PROCEDURE — 74011250636 HC RX REV CODE- 250/636: Performed by: HOSPITALIST

## 2022-01-24 PROCEDURE — 94760 N-INVAS EAR/PLS OXIMETRY 1: CPT

## 2022-01-24 PROCEDURE — 82962 GLUCOSE BLOOD TEST: CPT

## 2022-01-24 PROCEDURE — 77010033678 HC OXYGEN DAILY

## 2022-01-24 RX ADMIN — CARBIDOPA AND LEVODOPA 2 TABLET: 25; 100 TABLET ORAL at 10:50

## 2022-01-24 RX ADMIN — GUAIFENESIN 600 MG: 600 TABLET, EXTENDED RELEASE ORAL at 21:35

## 2022-01-24 RX ADMIN — DILTIAZEM HYDROCHLORIDE 30 MG: 30 TABLET, FILM COATED ORAL at 18:20

## 2022-01-24 RX ADMIN — Medication 3 UNITS: at 18:19

## 2022-01-24 RX ADMIN — ATORVASTATIN CALCIUM 40 MG: 40 TABLET, FILM COATED ORAL at 21:35

## 2022-01-24 RX ADMIN — DULOXETINE 120 MG: 60 CAPSULE, DELAYED RELEASE ORAL at 10:45

## 2022-01-24 RX ADMIN — GUAIFENESIN 600 MG: 600 TABLET, EXTENDED RELEASE ORAL at 10:45

## 2022-01-24 RX ADMIN — Medication 2 UNITS: at 21:34

## 2022-01-24 RX ADMIN — DEXAMETHASONE 6 MG: 4 TABLET ORAL at 10:44

## 2022-01-24 RX ADMIN — Medication 2 UNITS: at 13:37

## 2022-01-24 RX ADMIN — CILOSTAZOL 100 MG: 50 TABLET ORAL at 06:59

## 2022-01-24 RX ADMIN — CARBIDOPA AND LEVODOPA 2 TABLET: 25; 100 TABLET ORAL at 18:19

## 2022-01-24 RX ADMIN — VITAM B12 100 MCG: 100 TAB at 10:45

## 2022-01-24 RX ADMIN — ARIPIPRAZOLE 20 MG: 5 TABLET ORAL at 10:44

## 2022-01-24 RX ADMIN — DILTIAZEM HYDROCHLORIDE 30 MG: 30 TABLET, FILM COATED ORAL at 06:59

## 2022-01-24 RX ADMIN — Medication 3 UNITS: at 10:45

## 2022-01-24 RX ADMIN — CLOPIDOGREL 75 MG: 75 TABLET, FILM COATED ORAL at 10:45

## 2022-01-24 RX ADMIN — CARVEDILOL 12.5 MG: 12.5 TABLET, FILM COATED ORAL at 18:19

## 2022-01-24 RX ADMIN — Medication 1000 UNITS: at 10:45

## 2022-01-24 RX ADMIN — CILOSTAZOL 100 MG: 50 TABLET ORAL at 18:19

## 2022-01-24 RX ADMIN — CARBIDOPA AND LEVODOPA 2 TABLET: 25; 100 TABLET ORAL at 21:34

## 2022-01-24 RX ADMIN — DILTIAZEM HYDROCHLORIDE 30 MG: 30 TABLET, FILM COATED ORAL at 10:45

## 2022-01-24 RX ADMIN — CARBIDOPA AND LEVODOPA 2 TABLET: 25; 100 TABLET ORAL at 13:37

## 2022-01-24 RX ADMIN — CARVEDILOL 12.5 MG: 12.5 TABLET, FILM COATED ORAL at 10:45

## 2022-01-24 RX ADMIN — PANTOPRAZOLE SODIUM 40 MG: 40 TABLET, DELAYED RELEASE ORAL at 06:59

## 2022-01-24 NOTE — PROGRESS NOTES
Problem: Mobility Impaired (Adult and Pediatric)  Goal: *Acute Goals and Plan of Care (Insert Text)  Description: FUNCTIONAL STATUS PRIOR TO ADMISSION: The patient was functional at the wheelchair level and reports requiring assist for transfers using a walker. HOME SUPPORT PRIOR TO ADMISSION: The patient lived in a SNF and received assist of staff for mobility prn. Physical Therapy Goals  Revisited 1/24/2022, goals not met but remain appropriate so carry over         Physical Therapy Goals  Initiated 1/17/2022  1. Patient will move from supine to sit and sit to supine , scoot up and down, and roll side to side in bed with minimal assistance/contact guard assist within 7 day(s). 2.  Patient will transfer from bed to chair and chair to bed with moderate assistance  using the least restrictive device within 7 day(s). 3.  Patient will perform sit to stand with moderate assistance  within 7 day(s). 4.  Patient will ambulate with moderate assistance  for 5 feet with the least restrictive device within 7 day(s). Outcome: Progressing Towards Goal   PHYSICAL THERAPY TREATMENT: WEEKLY REASSESSMENT  Patient: Alvaro Chaudhari (35 y.o. female)  Date: 1/24/2022  Primary Diagnosis: CHF exacerbation ([facility] [I50.9]  Acute respiratory failure with hypoxia ([facility]) [J96.01]  Pulmonary edema [J81.1]  Atrial fibrillation with RVR ([facility]) [I48.91]  Acute on chronic systolic and diastolic heart failure, NYHA class 4 ([facility] [I50.43]        Precautions:   Fall,Contact,Skin (droplet plus)      ASSESSMENT  Patient continues with skilled PT services and is slowly progressing towards goals. With encouragement pt agreeable to participating in mobility. She remains limited by decreased initiation of movement (has Parkinson's disease), impaired sitting balance, generalized weakness, and retropulsion with attempts at sit to stand (unable to get her to standing with assist X 1).  Of note, her HR was stable (in a fib) and Sp02 stable on room air. Of note, since admission, she tested positive for COVID. Anticipate slow gains and recommend return to SNF      Vitals:      01/24/22 1534 01/24/22 1544   BP:   102/65 (!) 105/58   BP 1 Location:   Left upper arm Left upper arm   BP Patient Position:   Semi fowlers Sitting   Pulse:   85 79   Temp:       Resp:       Height:       Weight:       SpO2:on room air   96% 97%        Patient's progression toward goals since last assessment: slight gains, goals not met so carried then over. Current Level of Function Impacting Discharge (mobility/balance): mod assist to total assist at bed level. Functional Outcome Measure: The patient scored 1/28 on the Tinetti outcome measure which is indicative of high fall risk. .      Other factors to consider for discharge:  a fib, HTN, CKD 4, Parkinson's Disease, DM previous CVA, CHF, macrocytic anemia and per hematologist he is suspicious that she has myelodysplastic syndrome and has recommended a bone marrow biopsy but this annot happened at this time because pt tested positive for COVID            PLAN :  Goals have been updated based on progression since last assessment. Patient continues to benefit from skilled intervention to address the above impairments. Recommendations and Planned Interventions: bed mobility training, therapeutic exercises, patient and family training/education, and therapeutic activities      Frequency/Duration: Patient will be followed by physical therapy:  3 times a week to address goals.     Recommendation for discharge: (in order for the patient to meet his/her long term goals)  Therapy up to 5 days/week in SNF setting    This discharge recommendation:  A follow-up discussion with the attending provider and/or case management is planned    IF patient discharges home will need the following DME: to be determined (TBD)         SUBJECTIVE:   Patient stated I'm going to push you over, when I asked pt to work on sit to stand with me    OBJECTIVE DATA SUMMARY:   Chart  checked, pt cleared by nursing  HISTORY:    Past Medical History:   Diagnosis Date    Anxiety disorder     Atrial fibrillation (HCC)     CAD (coronary artery disease) 2007    stents, CABG x 3v    Carotid stenosis     Cervical stenosis of spinal canal 07/2019    Chronic kidney disease     Cough     CVA (cerebral vascular accident) ([facility] 07/2019    left lacunar infarct at head of caudate    Depression     AND CHRONIC ANXIETY    Diabetes (HCC)     GERD (gastroesophageal reflux disease)     High cholesterol     History of peptic ulcer     Bleeding ulcer with increased NSAID use    Hypertension     Left bundle branch block 01/01/2022    Left carotid stenosis 07/2019    s/p left CEA with Dr. Nory Andrade, old 2007    PUD (peptic ulcer disease)     Stroke ([facility]     Tremor     Valvular heart disease      Past Surgical History:   Procedure Laterality Date    COLONOSCOPY N/A 12/17/2021    COLONOSCOPY   :- performed by Pamela Pradhan MD at [facility] ENDOSCOPY    HX CAROTID ENDARTERECTOMY  07/2019    HX CORONARY ARTERY BYPASS GRAFT      HX TONSILLECTOMY  1963    KS CARDIAC SURG PROCEDURE UNLIST      CABG X3 VESSEL    KS TOTAL KNEE ARTHROPLASTY Right 2015       Personal factors and/or comorbidities impacting plan of care: a fib, HTN, CKD 4, Parkinson's Disease, DM previous CVA, CHF       Home Situation  Home Environment: [address] [place] [address] Name: [facility]  Support Systems: Other Family Member(s)  Current DME Used/Available at Home: Wheelchair    EXAMINATION/PRESENTATION/DECISION MAKING:   Critical Behavior:  Neurologic State: Alert  Orientation Level: Oriented X4  Cognition: Follows commands  Safety/Judgement: Decreased awareness of need for assistance,Decreased insight into deficits  Hearing:   Auditory  Auditory Impairment: None  Skin:  refer to MD and nursing notes  Edema: refer to MD and nursing notes  Range Of Motion:  AROM: Generally decreased, functional                       Strength:    Strength: Generally decreased, functional                    Tone & Sensation:   Tone: Normal              Sensation: Impaired (at baseline in feet)               Coordination:  Coordination: Generally decreased, functional  Vision:   Acuity: Within Defined Limits  Corrective Lenses: Reading glasses  Functional Mobility:  Bed Mobility:     Supine to Sit: Adaptive equipment;Bed Modified; Additional time; Moderate assistance  Sit to Supine: Moderate assistance; Additional time; Adaptive equipment  Scooting: Total assistance;Assist x1 (both in short sit and in supine to [address])  Transfers:  Sit to Stand:  (attempted, unable to achieve)                          Balance:   Sitting: Impaired; Without support  Sitting - Static: Good (unsupported)  Sitting - Dynamic: Fair (occasional)  Ambulation/Gait Training:                                                         Stairs: Therapeutic Exercises:       Functional Measure:  Tinetti test:    Sitting Balance: 1 (extrapolated)  Arises: 0  Attempts to Rise: 0  Immediate Standing Balance: 0  Standing Balance: 0  Nudged: 0  Eyes Closed: 0  Turn 360 Degrees - Continuous/Discontinuous: 0  Turn 360 Degrees - Steady/Unsteady: 0  Sitting Down: 0  Balance Score: 1 Balance total score  Indication of Gait: 0  R Step Length/Height: 0  L Step Length/Height: 0  R Foot Clearance: 0  L Foot Clearance: 0  Step Symmetry: 0  Step Continuity: 0  Path: 0  Trunk: 0  Walking Time: 0  Gait Score: 0 Gait total score  Total Score: 1/28 Overall total score         Tinetti Tool Score Risk of Falls  <19 = High Fall Risk  19-24 = Moderate Fall Risk  25-28 = Low Fall Risk  Tinetti ME. Performance-Oriented Assessment of Mobility Problems in Elderly Patients. [address]; [id number].  (Scoring Description: PT Bulletin Feb. 10, 1993)    Older adults: Yemi Preciado et al, 2009; n = [address] [place] elderly evaluated with ABC, KATJA, ADL, and IADL)  · Mean KATJA score for males aged 65-79 years = 26.21(3.40)  · Mean KATJA score for females age 69-68 years = 25.16(4.30)  · Mean KATJA score for males over 80 years = 23.29(6.02)  · Mean KATJA score for females over 80 years = 17.20(8.32)          Pain Rating:  None rated    Activity Tolerance:   Fair and SpO2 stable on RA    After treatment patient left in no apparent distress:   Supine in bed, Heels elevated for pressure relief, Call bell within reach, and Side rails x 3    COMMUNICATION/EDUCATION:   The patients plan of care was discussed with: Occupational therapist and Registered nurse. Fall prevention education was provided and the patient/caregiver indicated understanding., Patient/family have participated as able in goal setting and plan of care. , and Patient/family agree to work toward stated goals and plan of care.     Thank you for this referral.  Kelly Turner   Time Calculation: 33 mins

## 2022-01-24 NOTE — PROGRESS NOTES
Transitional Plan of Care  RUR 32%-High  Disposition- Covid positive on admit, but negative now. Currently on 2.5 lpm. Plan was to discharge to [address] [place] [address]. Held over weekend as she was not stable for discharge. Per hematology plan for bone marrow biopsy today. Once stable will likely discharge to SNF.

## 2022-01-24 NOTE — PROGRESS NOTES
[address] [facility]                                                                                          Hospitalist Progress Note  Dong Tidwell MD  Answering service: [phone] from in house phone        Date of Service:  2022  NAME:  Clarence Haro  :  1940  MRN:  [id number]      Admission Summary:   [de-identified] yo female who was discharged from hospital on 1/15 after being admitted for CHF, Anemia/AFib/MORENO. Patient was on diuretics but then held on  due to hypotension. Pt had anemia and given 1 U PRBCs on . Pt's Eliquis stopped due to concern for bleeding. Patient's diuretics were not restarted and discharged to [facility]. Patient sent back to ER due to SOB. Interval history / Subjective:     Patient seen and examined    She denied any chest pain,SOB while at bed today  Plan for bone marrow biopsy today       Assessment & Plan:     Atrial Fib. - Coreg  12.5 mg bid,on Cardizem IR 30 mg tid  - Recently taken off Apixaban  due to h/o GIB. EGD and colonoscopy on 2021 negative.  -Cardiology recommendation appreciated:PPM/AVN if rate difficult to control with medications and watchman as OP if unable to resume Apixaban   Will reassess with activity today with PT    Pulmonary edema-resolved  Acute on chronic systolic CHF NYHA III  CAD s/p CABG/stent-c/w Plavix/Statin/BB  Hypertension    -- Bumex held . ON coreg, no ACEI/ARBs due to CKD/hypotension  -per nephro,can resume BMP tomorrow if renal function stable    Chronic anemia,macrocytic. Etiology not clearyesenia multifactorial: nutritional, CKD? BM disorder. No overt GIB except when she had duodenal ulcer in . She needs further work up to delineate the cause of the anemia as this has become barrier to her being on anticoagulation for secondary stroke prevention. --She got blood on 2022 and 2021  --Stool studies have been negative for blood.   --Recent EGD and colonoscopy without evidence of bleeding,ulcer etc except incidental findings of hiatal hernia,polyps. -Pending B12, Folate,SPEP,UPEP,IF  --Consult hematology -plan for bone marrow today      Drowsiness/acute metabolic encephalopathy-resolved  History of left lacunar infarct/acute CVA  history of left carotid stenosis s/p CEA  -ABG unremarkable  -Noted between 04/2021 to 11/2021 Buspar/neurontin/abilify was added to Cymbalta. Per previous hospitalist, discontinued buspar and night time neurontin      MORENO on CKD 4-diuretics held-Appreciate nephrology    Parkinson's-c/w sinemet     Multiple hospitalizations. 5 admissions since 11/202. palliative care seen    CoVID +,chronic hypoxia: decadron 6 mg x10 days. Code status: DNR   DVT prophylaxis: SCDs        Care Plan discussed with: Eduardo Núñez. Anticipated Disposition: SNF/LTC       Hospital Problems  Date Reviewed: 1/17/2022          Codes Class Noted POA    * (Principal) Pulmonary edema ICD-10-CM: J81.1  ICD-9-CM: 799  1/16/2022 Yes        Atrial fibrillation with RVR ([address] ICD-10-CM: I48.91  ICD-9-CM: 427.31  1/16/2022 Unknown        Acute respiratory failure with hypoxia (HCC) ICD-10-CM: J96.01  ICD-9-CM: 518.81  1/16/2022 Unknown        Acute on chronic systolic and diastolic heart failure, NYHA class 4 (HCC) ICD-10-CM: I50.43  ICD-9-CM: 428.43  1/16/2022 Unknown        CHF exacerbation (HCC) ICD-10-CM: I50.9  ICD-9-CM: 428.0  1/15/2022 Unknown        Left bundle branch block ICD-10-CM: I44.7  ICD-9-CM: 426.3  1/1/2022 Unknown                Review of Systems:   A comprehensive review of systems was negative except for that written in the HPI. Vital Signs:    Last 24hrs VS reviewed since prior progress note.  Most recent are:  Visit Vitals  BP (!) 102/59 (BP 1 Location: Left upper arm, BP Patient Position: At rest)   Pulse 82   Temp 98.7 °F (37.1 °C)   Resp 18   Ht 5' 5\" (1.651 m)   Wt 73.5 kg (162 lb)   SpO2 97%   BMI 26.96 kg/m²         Intake/Output Summary (Last 24 hours) at 1/24/2022 1306  Last data filed at 1/24/2022 1031  Gross per 24 hour   Intake 100 ml   Output 600 ml   Net -500 ml        Physical Examination:     I had a face to face encounter with this patient and independently examined them on 1/24/2022 as outlined below:          Constitutional:  No acute distress, cooperative, pleasant    ENT:  Oral mucosa moist, oropharynx benign. Resp:  On oxygen via NC.decreased BS at bases   CV:  irregularly irregular ,normal rate at rest     GI:  Soft, non distended, non tender. normoactive bowel sounds,     Musculoskeletal: Trace  edema    Neurologic:  Moves all extremities. AAOx3, CN II-XII reviewed            Data Review:    Review and/or order of clinical lab test  Review and/or order of tests in the medicine section of CPT      Labs:     Recent Labs     01/24/22 0353   WBC 3.5*   HGB 7.8*   HCT 24.5*        Recent Labs     01/24/22  0353 01/23/22  0413 01/22/22 0418   * 132* 134*   K 4.3 4.2 4.2    101 102   CO2 25 26 25   BUN 68* 66* 61*   CREA 2.00* 2.06* 2.19*   * 178* 188*   CA 9.2 9.5 9.8   PHOS 3.8 4.3 3.9     Recent Labs     01/24/22  0353 01/23/22 0413 01/22/22 0418   ALB 3.3* 3.7 3.8     No results for input(s): INR, PTP, APTT, INREXT, INREXT in the last 72 hours. No results for input(s): FE, TIBC, PSAT, FERR in the last 72 hours. Lab Results   Component Value Date/Time    Folate 7.8 12/14/2021 04:40 AM      No results for input(s): PH, PCO2, PO2 in the last 72 hours. No results for input(s): CPK, CKNDX, TROIQ in the last 72 hours.     No lab exists for component: CPKMB  Lab Results   Component Value Date/Time    Cholesterol, total 148 09/23/2020 04:27 AM    HDL Cholesterol 52 09/23/2020 04:27 AM    LDL, calculated 83.8 09/23/2020 04:27 AM    Triglyceride 61 09/23/2020 04:27 AM    CHOL/HDL Ratio 2.8 09/23/2020 04:27 AM     Lab Results   Component Value Date/Time    Glucose (POC) 169 (H) 01/24/2022 11:31 AM    Glucose (POC) 239 (H) 01/24/2022 08:34 AM    Glucose (POC) 272 (H) 01/23/2022 09:02 PM    Glucose (POC) 220 (H) 01/23/2022 04:43 PM    Glucose (POC) 197 (H) 01/23/2022 12:27 PM     Lab Results   Component Value Date/Time    Color YELLOW/STRAW 12/30/2021 08:23 PM    Appearance CLOUDY (A) 12/30/2021 08:23 PM    Specific gravity 1.017 12/30/2021 08:23 PM    pH (UA) 6.0 12/30/2021 08:23 PM    Protein 100 (A) 12/30/2021 08:23 PM    Glucose Negative 12/30/2021 08:23 PM    Ketone Negative 12/30/2021 08:23 PM    Bilirubin Negative 12/30/2021 08:23 PM    Urobilinogen 1.0 12/30/2021 08:23 PM    Nitrites Negative 12/30/2021 08:23 PM    Leukocyte Esterase MODERATE (A) 12/30/2021 08:23 PM    Epithelial cells FEW 12/30/2021 08:23 PM    Bacteria 1+ (A) 12/30/2021 08:23 PM    WBC 0-4 12/30/2021 08:23 PM    RBC 0-5 12/30/2021 08:23 PM         Medications Reviewed:     Current Facility-Administered Medications   Medication Dose Route Frequency    [Held by provider] bumetanide (BUMEX) tablet 1 mg  1 mg Oral DAILY    dexAMETHasone (DECADRON) tablet 6 mg  6 mg Oral DAILY    carvediloL (COREG) tablet 12.5 mg  12.5 mg Oral BID WITH MEALS    guaiFENesin ER (MUCINEX) tablet 600 mg  600 mg Oral Q12H    dilTIAZem IR (CARDIZEM) tablet 30 mg  30 mg Oral TIDAC    busPIRone (BUSPAR) tablet 5 mg  5 mg Oral TID PRN    insulin lispro (HUMALOG) injection   SubCUTAneous AC&HS    glucose chewable tablet 16 g  4 Tablet Oral PRN    dextrose (D50W) injection syrg 12.5-25 g  12.5-25 g IntraVENous PRN    glucagon (GLUCAGEN) injection 1 mg  1 mg IntraMUSCular PRN    ARIPiprazole (ABILIFY) tablet 20 mg  20 mg Oral DAILY    atorvastatin (LIPITOR) tablet 40 mg  40 mg Oral QHS    carbidopa-levodopa (SINEMET)  mg per tablet 2 Tablet  2 Tablet Oral QID    cholecalciferol (VITAMIN D3) (1000 Units /25 mcg) tablet 1,000 Units  1,000 Units Oral DAILY    cilostazoL (PLETAL) tablet 100 mg  100 mg Oral ACB&D    clopidogreL (PLAVIX) tablet 75 mg  75 mg Oral DAILY AFTER BREAKFAST    cyanocobalamin (VITAMIN B12) tablet 100 mcg  100 mcg Oral DAILY    DULoxetine (CYMBALTA) capsule 120 mg  120 mg Oral DAILY    nitroglycerin (NITROSTAT) tablet 0.4 mg  0.4 mg SubLINGual Q5MIN PRN    pantoprazole (PROTONIX) tablet 40 mg  40 mg Oral ACB    polyethylene glycol (MIRALAX) packet 17 g  17 g Oral DAILY PRN    senna-docusate (PERICOLACE) 8.6-50 mg per tablet 1 Tablet  1 Tablet Oral QHS    albuterol-ipratropium (DUO-NEB) 2.5 MG-0.5 MG/3 ML  3 mL Nebulization Q6H PRN     ______________________________________________________________________  EXPECTED LENGTH OF STAY: 3d 19h  ACTUAL LENGTH OF STAY:          8                 Fco Manriquez MD

## 2022-01-24 NOTE — PROGRESS NOTES
Nephrology Progress Note  Remi Lung  Date of Admission : 1/15/2022    CC:  Follow up for MORENO on CKD       Assessment and Plan     MORENO on CKD:  - diff: progressive disease vs CRS  - stable renal function, diuretics on hold since Sunday  - Cr stable  - can resume diuretics tomorrow if renal function stable     Afib with RVR  - per cards     CKD IV:  - baseline increasing, likely around 1.6 to 1.8 now     COVID-19 +    HTN:  - BP stable    Anemia:  - w/u underway  - heme following     Encephalopathy:  - improving    Acute on Chronic HFpEF  CAD, hx of CABG  - ECHO w/ EF 55-60%        Parkinsons  Hx of CVA  PAD w/ L CEA  AAA       Interval History:  Not examined in the room. COVID + on 1/20. Cr stable. BP stable. bumex held over weekend for slight rise in Cr. Overall stable per RN. Current Medications: all current  Medications have been eviewed in EPIC  Review of Systems: Pertinent items are noted in HPI. Objective:  Vitals:    Vitals:    01/24/22 0352 01/24/22 0354 01/24/22 0549 01/24/22 0659   BP: 130/84   (!) 149/80   Pulse: 85 79 88 87   Resp: 16   14   Temp: 98.7 °F (37.1 °C)   98.7 °F (37.1 °C)   SpO2: 93%   98%   Weight:       Height:         Intake and Output:  No intake/output data recorded. No intake/output data recorded. Physical Examination:  Not examined in the room due to COVID isolation:    General: NAD per RN  Neck:  Supple, no mass  Resp:  Stable oxygenation  CV:  RRR on monitor  Neurologic:  confused  Psych:             Unable to assess   :  No pittman    []    High complexity decision making was performed  []    Patient is at high-risk of decompensation with multiple organ involvement    Lab Data Personally Reviewed: I have reviewed all the pertinent labs, microbiology data and radiology studies during assessment.     Recent Labs     01/24/22  0353 01/23/22  0413 01/22/22  0418   * 132* 134*   K 4.3 4.2 4.2    101 102   CO2 25 26 25   * 178* 188*   BUN 68* 66* 61*   CREA 2.00* 2.06* 2.19*   CA 9.2 9.5 9.8   PHOS 3.8 4.3 3.9   ALB 3.3* 3.7 3.8     Recent Labs     01/24/22  0353   WBC 3.5*   HGB 7.8*   HCT 24.5*        No results found for: SDES  Lab Results   Component Value Date/Time    Culture result: NO GROWTH 5 DAYS 01/07/2022 08:37 PM    Culture result: MRSA NOT PRESENT 12/10/2021 06:53 AM    Culture result:  12/10/2021 06:53 AM     Screening of patient nares for MRSA is for surveillance purposes and, if positive, to facilitate isolation considerations in high risk settings. It is not intended for automatic decolonization interventions per se as regimens are not sufficiently effective to warrant routine use. Recent Results (from the past 24 hour(s))   GLUCOSE, POC    Collection Time: 01/23/22 12:27 PM   Result Value Ref Range    Glucose (POC) 197 (H) 65 - 117 mg/dL    Performed by Lorena Chacon    GLUCOSE, POC    Collection Time: 01/23/22  4:43 PM   Result Value Ref Range    Glucose (POC) 220 (H) 65 - 117 mg/dL    Performed by Asuncion [address], POC    Collection Time: 01/23/22  9:02 PM   Result Value Ref Range    Glucose (POC) 272 (H) 65 - 117 mg/dL    Performed by Ariela ANSARI    CBC WITH AUTOMATED DIFF    Collection Time: 01/24/22  3:53 AM   Result Value Ref Range    WBC 3.5 (L) 3.6 - 11.0 K/uL    RBC 2.45 (L) 3.80 - 5.20 M/uL    HGB 7.8 (L) 11.5 - 16.0 g/dL    HCT 24.5 (L) 35.0 - 47.0 %    .0 (H) 80.0 - 99.0 FL    MCH 31.8 26.0 - 34.0 PG    MCHC 31.8 30.0 - 36.5 g/dL    RDW 13.3 11.5 - 14.5 %    PLATELET 089 040 - 432 K/uL    MPV 9.0 8.9 - 12.9 FL    NRBC 0.0 0  WBC    ABSOLUTE NRBC 0.00 0.00 - 0.01 K/uL    NEUTROPHILS 88 (H) 32 - 75 %    LYMPHOCYTES 7 (L) 12 - 49 %    MONOCYTES 4 (L) 5 - 13 %    EOSINOPHILS 0 0 - 7 %    BASOPHILS 0 0 - 1 %    IMMATURE GRANULOCYTES 1 (H) 0.0 - 0.5 %    ABS. NEUTROPHILS 3.2 1.8 - 8.0 K/UL    ABS. LYMPHOCYTES 0.2 (L) 0.8 - 3.5 K/UL    ABS. MONOCYTES 0.1 0.0 - 1.0 K/UL    ABS.  EOSINOPHILS 0.0 0.0 - 0.4 K/UL    ABS. BASOPHILS 0.0 0.0 - 0.1 K/UL    ABS. IMM. GRANS. 0.0 0.00 - 0.04 K/UL    DF SMEAR SCANNED      PLATELET COMMENTS Large Platelets      RBC COMMENTS ANISOCYTOSIS  1+        RBC COMMENTS MACROCYTOSIS  1+       RENAL FUNCTION PANEL    Collection Time: 01/24/22  3:53 AM   Result Value Ref Range    Sodium 134 (L) 136 - 145 mmol/L    Potassium 4.3 3.5 - 5.1 mmol/L    Chloride 101 97 - 108 mmol/L    CO2 25 21 - 32 mmol/L    Anion gap 8 5 - 15 mmol/L    Glucose 178 (H) 65 - 100 mg/dL    BUN 68 (H) 6 - 20 MG/DL    Creatinine 2.00 (H) 0.55 - 1.02 MG/DL    BUN/Creatinine ratio 34 (H) 12 - 20      GFR est AA 29 (L) >60 ml/min/1.73m2    GFR est non-AA 24 (L) >60 ml/min/1.73m2    Calcium 9.2 8.5 - 10.1 MG/DL    Phosphorus 3.8 2.6 - 4.7 MG/DL    Albumin 3.3 (L) 3.5 - 5.0 g/dL   GLUCOSE, POC    Collection Time: 01/24/22  8:34 AM   Result Value Ref Range    Glucose (POC) 239 (H) 65 - 117 mg/dL    Performed by VDAUJTGHERMES HOWARD(CON)                  Tatum Resendiz MD  [address]  Phone - [phone]   Fax - [phone]  [email or web address]

## 2022-01-24 NOTE — PROGRESS NOTES
Problem: Self Care Deficits Care Plan (Adult)  Goal: *Acute Goals and Plan of Care (Insert Text)  Description: FUNCTIONAL STATUS PRIOR TO ADMISSION: Prior to recent admissions (most recent 1/7/2022), pt was functional at the w/c level and modified independent with functional transfers to and from the wheelchair. She was discharged 1/14/2022 to [facility] to receive therapy services with believed plan to transition back to LTC and readmitted 1/15/2022 for SOB. HOME SUPPORT: The patient lived at [facility], however following last admission pt resided in SNF to receive therapy services and required assist from staff for mobility/ADL/IADL tasks. Occupational Therapy Goals  Weekly re-assessment 1/24/2021 revised and continued as appropriate  1. Patient will perform seated grooming task with modified independence within 7 day(s). 2.  Patient will perform anterior bathing, seated, with min A within 7 day(s). 3.  Patient will perform lower body dressing with moderate assistance and using AE PRN within 7 day(s). 4.  Patient will perform toilet transfers with moderate assistance within 7 day(s). 5.  Patient will perform all aspects of toileting with moderate assistance  within 7 day(s). 6.  Patient will utilize energy conservation techniques during functional activities with verbal cues within 7 day(s). Initiated 1/17/2022  1. Patient will perform seated grooming task with modified independence within 7 day(s). 2.  Patient will perform anterior bathing, seated, with supervision/set up within 7 day(s). 3.  Patient will perform lower body dressing with moderate assistance and using AE PRN within 7 day(s). 4.  Patient will perform toilet transfers with moderate assistance within 7 day(s). 5.  Patient will perform all aspects of toileting with moderate assistance  within 7 day(s).   6.  Patient will utilize energy conservation techniques during functional activities with verbal cues within 7 day(s). Outcome: Progressing Towards Goal   OCCUPATIONAL THERAPY TREATMENT/WEEKLY RE-ASSESSMENT  Patient: Ne Hernandez (23 y.o. female)  Date: 1/24/2022  Diagnosis: CHF exacerbation ([facility] [I50.9]  Acute respiratory failure with hypoxia ([facility] [J96.01]  Pulmonary edema [J81.1]  Atrial fibrillation with RVR (HCC) [I48.91]  Acute on chronic systolic and diastolic heart failure, NYHA class 4 ([facility] [I50.43] Pulmonary edema       Precautions: Fall,Skin,Contact  Chart, occupational therapy assessment, plan of care, and goals were reviewed. ASSESSMENT  Patient continues with skilled OT services and is progressing towards goals. Patient received reclined in bed, amenable to session. Patient reports feeling fatigued from being NPO for procedure (now cancelled). Completed bedlevel grooming/self feeding with up to [address] for thoroughness. Completed transfer to EOB with Mod A, deferred standing or transfer to EOB d/t fatigue. Returned to [facility] raised, all needs in reach and in NAD. Current Level of Function Impacting Discharge (ADLs): up to total A    Other factors to consider for discharge: below baseline         PLAN :  Goals have been updated based on progression since last assessment. Patient continues to benefit from skilled intervention to address the above impairments. Continue to follow patient 3 times a week to address goals.     Recommend with staff: Staff recommendations to increase patient independence, orientation and prevent and/or decrease potential agitation as able and safe:   -keeping day/night schedule with lights and sleeping  -bed/chair position or to chair during meals (regardless if patient eating the meal)  -completing ADLs, including self-feeding  -one step commands/simple information to allow patient time to process and respond  -inform patient what you are doing prior to doing it      Recommend next OT session: EOB/OOB ADL    Recommendation for discharge: (in order for the patient to meet his/her long term goals)  Therapy up to 5 days/week in SNF setting    This discharge recommendation:  Has been made in collaboration with the attending provider and/or case management    IF patient discharges home will need the following DME: patient owns DME required for discharge       SUBJECTIVE:   Patient stated i'll have a ginger ale. ... and some cake! Ana Stoddard    OBJECTIVE DATA SUMMARY:   Cognitive/Behavioral Status:  Neurologic State: Alert  Orientation Level: Oriented X4  Cognition: Follows commands  Perception: Appears intact  Perseveration: No perseveration noted  Safety/Judgement: Decreased awareness of need for assistance;Decreased insight into deficits    Functional Mobility and Transfers for ADLs:  Bed Mobility:  Supine to Sit: Moderate assistance  Sit to Supine: Moderate assistance    Transfers:             Balance:  Sitting: Intact  Sitting - Static: Good (unsupported)  Sitting - Dynamic: Fair (occasional)    ADL Intervention:  Feeding  Feeding Assistance: Set-up  Drink to Mouth: Set-up; Supervision    Grooming  Position Performed: Seated edge of bed  Washing Face: Supervision  Washing Hands: Set-up  Brushing/Combing Hair: Minimum assistance  Cues: Verbal cues provided                   Lower Body Dressing Assistance  Socks: Total assistance (dependent)  Position Performed: Supine  Cues: Physical assistance         Cognitive Retraining  Safety/Judgement: Decreased awareness of need for assistance;Decreased insight into deficits      Pain:  None noted    Activity Tolerance:   Fair and requires rest breaks    After treatment patient left in no apparent distress:   Supine in bed, Heels elevated for pressure relief, Call bell within reach, Bed / chair alarm activated, and Side rails x 3    COMMUNICATION/COLLABORATION:   The patients plan of care was discussed with: Registered nurse.      Fan Alvarez OT  Time Calculation: 25 mins

## 2022-01-24 NOTE — PROGRESS NOTES
Verbal bedside report given to Noah Meza RN oncoming nurse by Jef Barnett. Gabriel Yen RN off-going nurse. Report included current pt status and condition, recent results, hx of present illness, heart rate and rhythm, and respiratory status. 1030  Weaned pt to room air, sats remain 96%. Pt NPO for procedure later, very hungry.

## 2022-01-24 NOTE — PROGRESS NOTES
Bedside shift change report given to LAMONT Velarde (oncoming nurse) by Selvin Adams (offgoing nurse). Report included the following information SBAR, Kardex, Procedure Summary, Intake/Output, MAR, Recent Results and Med Rec Status.

## 2022-01-24 NOTE — PROGRESS NOTES
Verbal bedside report given to LAMONT Mcgovern oncoming nurse by Ramez Kat RN off-going nurse. Report included current pt status and condition, recent results, hx of present illness, heart rate and rhythm, and respiratory status.

## 2022-01-24 NOTE — PROGRESS NOTES
Interventional Radiology        1/24/2022    [de-identified] yo female with multiple admissions related to complications of CHF and pulmonary edema; pt has had chronic anemia- hematology concerned about possible MDS. Patient tested positive for COVID on 1/20/22. Radiology has been consulted for image-guided bone marrow biopsy with intravenous sedation. Radiology can offer image-guided bone marrow biopsy when patient is recovered from active Covid infection (recommended minimum 10 days s/p diagnosis). Can offer bone marrow biopsy as an outpatient procedure if discharge is desired. Please note that Dr. Arlette Kelly agrees with the above plan.        Mendez Cramer PA-C  Interventional Radiology

## 2022-01-25 LAB
ALBUMIN SERPL-MCNC: 3.2 G/DL (ref 3.5–5)
ANION GAP SERPL CALC-SCNC: 8 MMOL/L (ref 5–15)
BASOPHILS # BLD: 0 K/UL (ref 0–0.1)
BASOPHILS NFR BLD: 0 % (ref 0–1)
BUN SERPL-MCNC: 64 MG/DL (ref 6–20)
BUN/CREAT SERPL: 36 (ref 12–20)
CALCIUM SERPL-MCNC: 9.1 MG/DL (ref 8.5–10.1)
CHLORIDE SERPL-SCNC: 100 MMOL/L (ref 97–108)
CO2 SERPL-SCNC: 24 MMOL/L (ref 21–32)
CREAT SERPL-MCNC: 1.79 MG/DL (ref 0.55–1.02)
DIFFERENTIAL METHOD BLD: ABNORMAL
EOSINOPHIL # BLD: 0 K/UL (ref 0–0.4)
EOSINOPHIL NFR BLD: 0 % (ref 0–7)
ERYTHROCYTE [DISTWIDTH] IN BLOOD BY AUTOMATED COUNT: 13 % (ref 11.5–14.5)
FOLATE SERPL-MCNC: 7.8 NG/ML (ref 5–21)
GLUCOSE BLD STRIP.AUTO-MCNC: 144 MG/DL (ref 65–117)
GLUCOSE BLD STRIP.AUTO-MCNC: 202 MG/DL (ref 65–117)
GLUCOSE BLD STRIP.AUTO-MCNC: 231 MG/DL (ref 65–117)
GLUCOSE BLD STRIP.AUTO-MCNC: 235 MG/DL (ref 65–117)
GLUCOSE SERPL-MCNC: 160 MG/DL (ref 65–100)
HCT VFR BLD AUTO: 26.2 % (ref 35–47)
HGB BLD-MCNC: 8.6 G/DL (ref 11.5–16)
IMM GRANULOCYTES # BLD AUTO: 0 K/UL (ref 0–0.04)
IMM GRANULOCYTES NFR BLD AUTO: 1 % (ref 0–0.5)
LYMPHOCYTES # BLD: 0.2 K/UL (ref 0.8–3.5)
LYMPHOCYTES NFR BLD: 6 % (ref 12–49)
MCH RBC QN AUTO: 32.1 PG (ref 26–34)
MCHC RBC AUTO-ENTMCNC: 32.8 G/DL (ref 30–36.5)
MCV RBC AUTO: 97.8 FL (ref 80–99)
MONOCYTES # BLD: 0.3 K/UL (ref 0–1)
MONOCYTES NFR BLD: 7 % (ref 5–13)
NEUTS SEG # BLD: 3.5 K/UL (ref 1.8–8)
NEUTS SEG NFR BLD: 86 % (ref 32–75)
NRBC # BLD: 0 K/UL (ref 0–0.01)
NRBC BLD-RTO: 0 PER 100 WBC
PHOSPHATE SERPL-MCNC: 3.8 MG/DL (ref 2.6–4.7)
PLATELET # BLD AUTO: 265 K/UL (ref 150–400)
PMV BLD AUTO: 9.3 FL (ref 8.9–12.9)
POTASSIUM SERPL-SCNC: 4.4 MMOL/L (ref 3.5–5.1)
RBC # BLD AUTO: 2.68 M/UL (ref 3.8–5.2)
RBC MORPH BLD: ABNORMAL
SERVICE CMNT-IMP: ABNORMAL
SODIUM SERPL-SCNC: 132 MMOL/L (ref 136–145)
VIT B12 SERPL-MCNC: 710 PG/ML (ref 193–986)
WBC # BLD AUTO: 4 K/UL (ref 3.6–11)

## 2022-01-25 PROCEDURE — 65270000029 HC RM PRIVATE

## 2022-01-25 PROCEDURE — 85025 COMPLETE CBC W/AUTO DIFF WBC: CPT

## 2022-01-25 PROCEDURE — 74011250637 HC RX REV CODE- 250/637: Performed by: NURSE PRACTITIONER

## 2022-01-25 PROCEDURE — 82607 VITAMIN B-12: CPT

## 2022-01-25 PROCEDURE — 74011250637 HC RX REV CODE- 250/637: Performed by: HOSPITALIST

## 2022-01-25 PROCEDURE — 36415 COLL VENOUS BLD VENIPUNCTURE: CPT

## 2022-01-25 PROCEDURE — 94760 N-INVAS EAR/PLS OXIMETRY 1: CPT

## 2022-01-25 PROCEDURE — 80069 RENAL FUNCTION PANEL: CPT

## 2022-01-25 PROCEDURE — 74011250636 HC RX REV CODE- 250/636: Performed by: HOSPITALIST

## 2022-01-25 PROCEDURE — 82746 ASSAY OF FOLIC ACID SERUM: CPT

## 2022-01-25 PROCEDURE — 97535 SELF CARE MNGMENT TRAINING: CPT

## 2022-01-25 PROCEDURE — 93005 ELECTROCARDIOGRAM TRACING: CPT

## 2022-01-25 PROCEDURE — 74011636637 HC RX REV CODE- 636/637: Performed by: HOSPITALIST

## 2022-01-25 PROCEDURE — 82962 GLUCOSE BLOOD TEST: CPT

## 2022-01-25 RX ORDER — BUMETANIDE 1 MG/1
1 TABLET ORAL 2 TIMES DAILY
Status: DISCONTINUED | OUTPATIENT
Start: 2022-01-25 | End: 2022-01-27 | Stop reason: HOSPADM

## 2022-01-25 RX ADMIN — Medication 2 UNITS: at 09:49

## 2022-01-25 RX ADMIN — PANTOPRAZOLE SODIUM 40 MG: 40 TABLET, DELAYED RELEASE ORAL at 07:09

## 2022-01-25 RX ADMIN — DEXAMETHASONE 6 MG: 4 TABLET ORAL at 09:48

## 2022-01-25 RX ADMIN — ATORVASTATIN CALCIUM 40 MG: 40 TABLET, FILM COATED ORAL at 22:33

## 2022-01-25 RX ADMIN — DILTIAZEM HYDROCHLORIDE 30 MG: 30 TABLET, FILM COATED ORAL at 13:14

## 2022-01-25 RX ADMIN — GUAIFENESIN 600 MG: 600 TABLET, EXTENDED RELEASE ORAL at 22:33

## 2022-01-25 RX ADMIN — Medication 1000 UNITS: at 09:49

## 2022-01-25 RX ADMIN — CARBIDOPA AND LEVODOPA 2 TABLET: 25; 100 TABLET ORAL at 13:14

## 2022-01-25 RX ADMIN — DULOXETINE 120 MG: 60 CAPSULE, DELAYED RELEASE ORAL at 09:49

## 2022-01-25 RX ADMIN — CARVEDILOL 12.5 MG: 12.5 TABLET, FILM COATED ORAL at 16:55

## 2022-01-25 RX ADMIN — Medication 3 UNITS: at 16:54

## 2022-01-25 RX ADMIN — DILTIAZEM HYDROCHLORIDE 30 MG: 30 TABLET, FILM COATED ORAL at 16:55

## 2022-01-25 RX ADMIN — CILOSTAZOL 100 MG: 50 TABLET ORAL at 16:55

## 2022-01-25 RX ADMIN — Medication 3 UNITS: at 13:14

## 2022-01-25 RX ADMIN — CARBIDOPA AND LEVODOPA 2 TABLET: 25; 100 TABLET ORAL at 22:33

## 2022-01-25 RX ADMIN — CARBIDOPA AND LEVODOPA 2 TABLET: 25; 100 TABLET ORAL at 09:49

## 2022-01-25 RX ADMIN — CILOSTAZOL 100 MG: 50 TABLET ORAL at 07:09

## 2022-01-25 RX ADMIN — CARBIDOPA AND LEVODOPA 2 TABLET: 25; 100 TABLET ORAL at 17:00

## 2022-01-25 RX ADMIN — CLOPIDOGREL 75 MG: 75 TABLET, FILM COATED ORAL at 09:49

## 2022-01-25 RX ADMIN — DILTIAZEM HYDROCHLORIDE 30 MG: 30 TABLET, FILM COATED ORAL at 07:09

## 2022-01-25 RX ADMIN — BUMETANIDE 1 MG: 1 TABLET ORAL at 18:00

## 2022-01-25 RX ADMIN — Medication 2 UNITS: at 22:33

## 2022-01-25 RX ADMIN — GUAIFENESIN 600 MG: 600 TABLET, EXTENDED RELEASE ORAL at 09:48

## 2022-01-25 NOTE — PROGRESS NOTES
0800:  Verbal bedside report given to LAMONT Mcgovern oncoming nurse by Gina Lopez RN off-going nurse. Report included current pt status and condition, recent results, hx of present illness, heart rate and rhythm, and respiratory status.

## 2022-01-25 NOTE — PROGRESS NOTES
Hematology-Oncology Progress Note      Hernando Armijo  1940  [id number]  1/25/2022      Subjective:     I spoke with patient explaining Radiology's reluctance to do a non-urgent biopsy with her COVID. \"I'm glad, because I wasn't going to do it anyway\". I offered to see her in my clinic on discharge to further investigate her anemia. She said she would consider that option. Allergies: Patient has no known allergies.   Current Facility-Administered Medications   Medication Dose Route Frequency Provider Last Rate Last Admin    [Held by provider] bumetanide (BUMEX) tablet 1 mg  1 mg Oral DAILY Brandy Chin MD        dexAMETHasone (DECADRON) tablet 6 mg  6 mg Oral DAILY Scott Diehl MD   6 mg at 01/24/22 1044    carvediloL (COREG) tablet 12.5 mg  12.5 mg Oral BID WITH MEALS Melonie Her MD   12.5 mg at 01/24/22 1819    guaiFENesin ER (MUCINEX) tablet 600 mg  600 mg Oral Q12H Matilda Her MD   600 mg at 01/24/22 2135    dilTIAZem IR (CARDIZEM) tablet 30 mg  30 mg Oral TIDAC Duglas Sue, NP   30 mg at 01/25/22 4366    busPIRone (BUSPAR) tablet 5 mg  5 mg Oral TID PRN Raul Arenas MD   5 mg at 01/21/22 2212    insulin lispro (HUMALOG) injection   SubCUTAneous AC&HS Krissy Lynch MD   2 Units at 01/24/22 2134    glucose chewable tablet 16 g  4 Tablet Oral PRN Krissy Lynch MD        dextrose (D50W) injection syrg 12.5-25 g  12.5-25 g IntraVENous PRN Krissy Lynch MD        glucagon (GLUCAGEN) injection 1 mg  1 mg IntraMUSCular PRN Krissy Lynch MD        ARIPiprazole (ABILIFY) tablet 20 mg  20 mg Oral DAILY Matilda Her MD   20 mg at 01/24/22 1044    atorvastatin (LIPITOR) tablet 40 mg  40 mg Oral QHS Matilda Her MD   40 mg at 01/24/22 2135    carbidopa-levodopa (SINEMET)  mg per tablet 2 Tablet  2 Tablet Oral QID Enriqueta Sherwood MD   2 Tablet at 01/24/22 8276    cholecalciferol (VITAMIN D3) (1000 Units /25 mcg) tablet 1,000 Units  1,000 Units Oral DAILY Enriqueta hSerwood MD 1,000 Units at 01/24/22 1045    cilostazoL (PLETAL) tablet 100 mg  100 mg Oral ACB&D Marissa Palmer MD   100 mg at 01/25/22 6315    clopidogreL (PLAVIX) tablet 75 mg  75 mg Oral DAILY AFTER Johnny Villegas MD   75 mg at 01/24/22 1045    cyanocobalamin (VITAMIN B12) tablet 100 mcg  100 mcg Oral DAILY Marissa Palmer MD   100 mcg at 01/24/22 1045    DULoxetine (CYMBALTA) capsule 120 mg  120 mg Oral DAILY Marissa Palmer MD   120 mg at 01/24/22 1045    nitroglycerin (NITROSTAT) tablet 0.4 mg  0.4 mg SubLINGual Q5MIN PRN Marissa Palmer MD        pantoprazole (PROTONIX) tablet 40 mg  40 mg Oral ACB Marissa Palmer MD   40 mg at 01/25/22 0823    polyethylene glycol (MIRALAX) packet 17 g  17 g Oral DAILY PRN Marissa Palmer MD        senna-docusate (PERICOLACE) 8.6-50 mg per tablet 1 Tablet  1 Tablet Oral QHS Marissa Palmer MD   1 Tablet at 01/22/22 2221    albuterol-ipratropium (DUO-NEB) 2.5 MG-0.5 MG/3 ML  3 mL Nebulization Q6H PRN Marissa Palmer MD         Objective:     Patient Vitals for the past 24 hrs:   BP Temp Pulse Resp SpO2 Height   01/25/22 0709 (!) 161/80 98.1 °F (36.7 °C) 90 11 97 %    01/25/22 0551   95      01/25/22 0400   [de-identified]      01/25/22 0244 (!) 156/82 98.4 °F (36.9 °C) 83 15 97 %    01/25/22 0159   98      01/25/22 0019     95 %    01/24/22 2326 133/70 97.5 °F (36.4 °C) 86 17 98 %    01/24/22 2325     99 %    01/24/22 2152   68      01/24/22 2122     95 %    01/24/22 1957   76      01/24/22 1901 115/68 97.5 °F (36.4 °C) 87 19 97 %    01/24/22 1821   85      01/24/22 1600 (!) 107/59  86 17 94 %    01/24/22 1544 (!) 105/58  79  97 %    01/24/22 1534 102/65  85  96 %    01/24/22 1512 118/73 98.5 °F (36.9 °C) 90 19 100 %    01/24/22 1413   80      01/24/22 1207   80      01/24/22 1200 121/64  74 16 94 %    01/24/22 1133 (!) 102/59 98.7 °F (37.1 °C) 72 18 97 %    01/24/22 1031   77 15 96 % 5' 5\" (1.651 m)       Gen: NAD  HEENT: PERRL, Sclerae anicteric  Ext: No c/c/e    Available labs reviewed:  Labs:    Recent Results (from the past 24 hour(s))   GLUCOSE, POC    Collection Time: 01/24/22 11:31 AM   Result Value Ref Range    Glucose (POC) 169 (H) 65 - 117 mg/dL    Performed by Duane HOWARD(CON)    GLUCOSE, POC    Collection Time: 01/24/22  4:02 PM   Result Value Ref Range    Glucose (POC) 218 (H) 65 - 117 mg/dL    Performed by Duane HOWARD(CON)    GLUCOSE, POC    Collection Time: 01/24/22  9:07 PM   Result Value Ref Range    Glucose (POC) 230 (H) 65 - 117 mg/dL    Performed by Fransico Perez    CBC WITH AUTOMATED DIFF    Collection Time: 01/25/22  5:07 AM   Result Value Ref Range    WBC 4.0 3.6 - 11.0 K/uL    RBC 2.68 (L) 3.80 - 5.20 M/uL    HGB 8.6 (L) 11.5 - 16.0 g/dL    HCT 26.2 (L) 35.0 - 47.0 %    MCV 97.8 80.0 - 99.0 FL    MCH 32.1 26.0 - 34.0 PG    MCHC 32.8 30.0 - 36.5 g/dL    RDW 13.0 11.5 - 14.5 %    PLATELET 460 477 - 383 K/uL    MPV 9.3 8.9 - 12.9 FL    NRBC 0.0 0  WBC    ABSOLUTE NRBC 0.00 0.00 - 0.01 K/uL    NEUTROPHILS 86 (H) 32 - 75 %    LYMPHOCYTES 6 (L) 12 - 49 %    MONOCYTES 7 5 - 13 %    EOSINOPHILS 0 0 - 7 %    BASOPHILS 0 0 - 1 %    IMMATURE GRANULOCYTES 1 (H) 0.0 - 0.5 %    ABS. NEUTROPHILS 3.5 1.8 - 8.0 K/UL    ABS. LYMPHOCYTES 0.2 (L) 0.8 - 3.5 K/UL    ABS. MONOCYTES 0.3 0.0 - 1.0 K/UL    ABS. EOSINOPHILS 0.0 0.0 - 0.4 K/UL    ABS. BASOPHILS 0.0 0.0 - 0.1 K/UL    ABS. IMM.  GRANS. 0.0 0.00 - 0.04 K/UL    DF SMEAR SCANNED      RBC COMMENTS MACROCYTOSIS  1+        RBC COMMENTS OVALOCYTES  PRESENT        RBC COMMENTS TRENT CELLS  PRESENT       RENAL FUNCTION PANEL    Collection Time: 01/25/22  5:07 AM   Result Value Ref Range    Sodium 132 (L) 136 - 145 mmol/L    Potassium 4.4 3.5 - 5.1 mmol/L    Chloride 100 97 - 108 mmol/L    CO2 24 21 - 32 mmol/L    Anion gap 8 5 - 15 mmol/L    Glucose 160 (H) 65 - 100 mg/dL    BUN 64 (H) 6 - 20 MG/DL    Creatinine 1.79 (H) 0.55 - 1.02 MG/DL    BUN/Creatinine ratio 36 (H) 12 - 20      GFR est AA 33 (L) >60 ml/min/1.73m2    GFR est non-AA 27 (L) >60 ml/min/1.73m2    Calcium 9.1 8.5 - 10.1 MG/DL    Phosphorus 3.8 2.6 - 4.7 MG/DL    Albumin 3.2 (L) 3.5 - 5.0 g/dL   GLUCOSE, POC    Collection Time: 01/25/22  8:15 AM   Result Value Ref Range    Glucose (POC) 144 (H) 65 - 117 mg/dL    Performed by Sirena Velazquez and Plan     ASSESSMENT AND PLAN:  An 81-year-old woman noted to have macrocytic anemia, previously with normal B12, now presenting to the hospital for shortness of breath, found to have COVID in the context of congestive heart failure and chronic renal insufficiency, asked to see for macrocytic anemia. 1.  Macrocytic anemia:  I am suspicious that she has myelodysplastic syndrome. I have recommended a bone marrow biopsy, but Radiology unable to do a non-urgent BM biopsy on a patient with COVID. I have spoken with both the patient and her niece Ms. Martinez and offered to continue her work-up as an outpatient. I gave the patient my card with my contact information and her niece my information by phone. Will sign off. TY for consult.        Reed Lewis MD  Hematology/Oncology  Phone [phone]

## 2022-01-25 NOTE — PROGRESS NOTES
[address] [facility] Adult  Hospitalist Group                                                                                          Hospitalist Progress Note  Franklin Wooten MD  Answering service: [phone] OR [phone] from in house phone        Date of Service:  2022  NAME:  Omayra Mckinnon  :  1940  MRN:  [id number]      Admission Summary:   [de-identified] yo female who was discharged from hospital on 1/15 after being admitted for CHF, Anemia/AFib/MORENO. Patient was on diuretics but then held on  due to hypotension. Pt had anemia and given 1 U PRBCs on . Pt's Eliquis stopped due to concern for bleeding. Patient's diuretics were not restarted and discharged to [facility]. Patient sent back to ER due to SOB. Interval history / Subjective:     Patient seen and examined    She denied any chest pain,SOB  Bone marrow biopsy as OP due to COVID status,waiting for rehab bed         Assessment & Plan:     Atrial Fib. - Coreg  12.5 mg bid,on Cardizem IR 30 mg tid  - Recently taken off Apixaban  due to h/o GIB. EGD and colonoscopy on 2021 negative.  -Cardiology recommendation appreciated:PPM/AVN if rate difficult to control with medications and watchman as OP if unable to resume Apixaban   Will reassess with activity today with PT    Pulmonary edema-resolved  Acute on chronic systolic CHF NYHA III  CAD s/p CABG/stent-c/w Plavix/Statin/BB  Hypertension    -- Bumex resumed . ON coreg, no ACEI/ARBs due to CKD/hypotension  -per nephro,can resume BMP tomorrow if renal function stable    Chronic anemia,macrocytic. Etiology not clearyesenia multifactorial: nutritional, CKD? BM disorder. No overt GIB except when she had duodenal ulcer in . She needs further work up to delineate the cause of the anemia as this has become barrier to her being on anticoagulation for secondary stroke prevention. --She got blood on 2022 and 2021  --Stool studies have been negative for blood.   --Recent EGD and colonoscopy without evidence of bleeding,ulcer etc except incidental findings of hiatal hernia,polyps. SPEP,UPEP,IF nwl  B12 ,folate pending        Drowsiness/acute metabolic encephalopathy-resolved  History of left lacunar infarct/acute CVA  history of left carotid stenosis s/p CEA  -ABG unremarkable  -Noted between 04/2021 to 11/2021 Buspar/neurontin/abilify was added to Cymbalta. Per previous hospitalist, discontinued buspar and night time neurontin      MORENO on CKD 4-diuretics resumed-Appreciate nephrology    Parkinson's-c/w sinemet     Multiple hospitalizations. 5 admissions since 11/202. palliative care seen    CoVID +,chronic hypoxia: decadron 6 mg x10 days. Code status: DNR   DVT prophylaxis: SCDs        Care Plan discussed with: Glenn Lancaster. Anticipated Disposition: SNF/LTC       Hospital Problems  Date Reviewed: 1/17/2022          Codes Class Noted POA    * (Principal) Pulmonary edema ICD-10-CM: J81.1  ICD-9-CM: 941  1/16/2022 Yes        Atrial fibrillation with RVR ([facility] [address] ICD-10-CM: I48.91  ICD-9-CM: 427.31  1/16/2022 Unknown        Acute respiratory failure with hypoxia (HCC) ICD-10-CM: J96.01  ICD-9-CM: 518.81  1/16/2022 Unknown        Acute on chronic systolic and diastolic heart failure, NYHA class 4 (HCC) ICD-10-CM: I50.43  ICD-9-CM: 428.43  1/16/2022 Unknown        CHF exacerbation (HCC) ICD-10-CM: I50.9  ICD-9-CM: 428.0  1/15/2022 Unknown        Left bundle branch block ICD-10-CM: I44.7  ICD-9-CM: 426.3  1/1/2022 Unknown                Review of Systems:   A comprehensive review of systems was negative except for that written in the HPI. Vital Signs:    Last 24hrs VS reviewed since prior progress note.  Most recent are:  Visit Vitals  /79 (BP 1 Location: Left upper arm, BP Patient Position: At rest;Sitting)   Pulse (!) 110   Temp 98.1 °F (36.7 °C)   Resp 19   Ht 5' 5\" (1.651 m)   Wt 75.2 kg (165 lb 12.6 oz)   SpO2 98%   BMI 27.59 kg/m²         Intake/Output Summary (Last 24 hours) at 1/25/2022 1803  Last data filed at 1/25/2022 1600  Gross per 24 hour   Intake 900 ml   Output 1550 ml   Net -650 ml        Physical Examination:     I had a face to face encounter with this patient and independently examined them on 1/25/2022 as outlined below:          Constitutional:  No acute distress, cooperative, pleasant    ENT:  Oral mucosa moist, oropharynx benign. Resp:  On oxygen via NC.decreased BS at bases   CV:  irregularly irregular ,normal rate at rest     GI:  Soft, non distended, non tender. normoactive bowel sounds,     Musculoskeletal: No LE  edema    Neurologic:  Moves all extremities. AAOx3, CN II-XII reviewed            Data Review:    Review and/or order of clinical lab test  Review and/or order of tests in the medicine section of [facility]      Labs:     Recent Labs     01/25/22  0507 01/24/22  0353   WBC 4.0 3.5*   HGB 8.6* 7.8*   HCT 26.2* 24.5*    233     Recent Labs     01/25/22  0507 01/24/22  0353 01/23/22  0413   * 134* 132*   K 4.4 4.3 4.2    101 101   CO2 24 25 26   BUN 64* 68* 66*   CREA 1.79* 2.00* 2.06*   * 178* 178*   CA 9.1 9.2 9.5   PHOS 3.8 3.8 4.3     Recent Labs     01/25/22  0507 01/24/22  0353 01/23/22  0413   ALB 3.2* 3.3* 3.7     No results for input(s): INR, PTP, APTT, INREXT, INREXT in the last 72 hours. No results for input(s): FE, TIBC, PSAT, FERR in the last 72 hours. Lab Results   Component Value Date/Time    Folate 7.8 12/14/2021 04:40 AM      No results for input(s): PH, PCO2, PO2 in the last 72 hours. No results for input(s): CPK, CKNDX, TROIQ in the last 72 hours.     No lab exists for component: CPKMB  Lab Results   Component Value Date/Time    Cholesterol, total 148 09/23/2020 04:27 AM    HDL Cholesterol 52 09/23/2020 04:27 AM    LDL, calculated 83.8 09/23/2020 04:27 AM    Triglyceride 61 09/23/2020 04:27 AM    CHOL/HDL Ratio 2.8 09/23/2020 04:27 AM     Lab Results   Component Value Date/Time    Glucose (POC) 231 (H) 01/25/2022 04:54 PM    Glucose (POC) 202 (H) 01/25/2022 11:59 AM    Glucose (POC) 144 (H) 01/25/2022 08:15 AM    Glucose (POC) 230 (H) 01/24/2022 09:07 PM    Glucose (POC) 218 (H) 01/24/2022 04:02 PM     Lab Results   Component Value Date/Time    Color YELLOW/STRAW 12/30/2021 08:23 PM    Appearance CLOUDY (A) 12/30/2021 08:23 PM    Specific gravity 1.017 12/30/2021 08:23 PM    pH (UA) 6.0 12/30/2021 08:23 PM    Protein 100 (A) 12/30/2021 08:23 PM    Glucose Negative 12/30/2021 08:23 PM    Ketone Negative 12/30/2021 08:23 PM    Bilirubin Negative 12/30/2021 08:23 PM    Urobilinogen 1.0 12/30/2021 08:23 PM    Nitrites Negative 12/30/2021 08:23 PM    Leukocyte Esterase MODERATE (A) 12/30/2021 08:23 PM    Epithelial cells FEW 12/30/2021 08:23 PM    Bacteria 1+ (A) 12/30/2021 08:23 PM    WBC 0-4 12/30/2021 08:23 PM    RBC 0-5 12/30/2021 08:23 PM         Medications Reviewed:     Current Facility-Administered Medications   Medication Dose Route Frequency    bumetanide (BUMEX) tablet 1 mg  1 mg Oral BID    dexAMETHasone (DECADRON) tablet 6 mg  6 mg Oral DAILY    carvediloL (COREG) tablet 12.5 mg  12.5 mg Oral BID WITH MEALS    guaiFENesin ER (MUCINEX) tablet 600 mg  600 mg Oral Q12H    dilTIAZem IR (CARDIZEM) tablet 30 mg  30 mg Oral TIDAC    busPIRone (BUSPAR) tablet 5 mg  5 mg Oral TID PRN    insulin lispro (HUMALOG) injection   SubCUTAneous AC&HS    glucose chewable tablet 16 g  4 Tablet Oral PRN    dextrose (D50W) injection syrg 12.5-25 g  12.5-25 g IntraVENous PRN    glucagon (GLUCAGEN) injection 1 mg  1 mg IntraMUSCular PRN    ARIPiprazole (ABILIFY) tablet 20 mg  20 mg Oral DAILY    atorvastatin (LIPITOR) tablet 40 mg  40 mg Oral QHS    carbidopa-levodopa (SINEMET)  mg per tablet 2 Tablet  2 Tablet Oral QID    cholecalciferol (VITAMIN D3) (1000 Units /25 mcg) tablet 1,000 Units  1,000 Units Oral DAILY    cilostazoL (PLETAL) tablet 100 mg  100 mg Oral ACB&D    clopidogreL (PLAVIX) tablet 75 mg  75 mg Oral DAILY AFTER BREAKFAST    cyanocobalamin (VITAMIN B12) tablet 100 mcg  100 mcg Oral DAILY    DULoxetine (CYMBALTA) capsule 120 mg  120 mg Oral DAILY    nitroglycerin (NITROSTAT) tablet 0.4 mg  0.4 mg SubLINGual Q5MIN PRN    pantoprazole (PROTONIX) tablet 40 mg  40 mg Oral ACB    polyethylene glycol (MIRALAX) packet 17 g  17 g Oral DAILY PRN    senna-docusate (PERICOLACE) 8.6-50 mg per tablet 1 Tablet  1 Tablet Oral QHS    albuterol-ipratropium (DUO-NEB) 2.5 MG-0.5 MG/3 ML  3 mL Nebulization Q6H PRN     ______________________________________________________________________  EXPECTED LENGTH OF STAY: 3d 19h  ACTUAL LENGTH OF STAY:          9                 Stephanie Painter MD

## 2022-01-25 NOTE — PROGRESS NOTES
Verbal bedside report given to Ramez Kat, RN oncoming nurse by Alistair Butler. Manisha Vasquez, LAMONT off-going nurse. Report included current pt status and condition, recent results, hx of present illness, heart rate and rhythm, and respiratory status. 0930  Got lift weight on pt and zeroed bed, pt disgruntled at breakfast interruption.

## 2022-01-25 NOTE — PROGRESS NOTES
Problem: Self Care Deficits Care Plan (Adult)  Goal: *Acute Goals and Plan of Care (Insert Text)  Description: FUNCTIONAL STATUS PRIOR TO ADMISSION: Prior to recent admissions (most recent 1/7/2022), pt was functional at the w/c level and modified independent with functional transfers to and from the wheelchair. She was discharged 1/14/2022 to [facility] to receive therapy services with believed plan to transition back to LTC and readmitted 1/15/2022 for SOB. HOME SUPPORT: The patient lived at [facility], however following last admission pt resided in SNF to receive therapy services and required assist from staff for mobility/ADL/IADL tasks. Occupational Therapy Goals  Weekly re-assessment 1/24/2021 revised and continued as appropriate  1. Patient will perform seated grooming task with modified independence within 7 day(s). 2.  Patient will perform anterior bathing, seated, with min A within 7 day(s). 3.  Patient will perform lower body dressing with moderate assistance and using AE PRN within 7 day(s). 4.  Patient will perform toilet transfers with moderate assistance within 7 day(s). 5.  Patient will perform all aspects of toileting with moderate assistance  within 7 day(s). 6.  Patient will utilize energy conservation techniques during functional activities with verbal cues within 7 day(s). Initiated 1/17/2022  1. Patient will perform seated grooming task with modified independence within 7 day(s). 2.  Patient will perform anterior bathing, seated, with supervision/set up within 7 day(s). 3.  Patient will perform lower body dressing with moderate assistance and using AE PRN within 7 day(s). 4.  Patient will perform toilet transfers with moderate assistance within 7 day(s). 5.  Patient will perform all aspects of toileting with moderate assistance  within 7 day(s).   6.  Patient will utilize energy conservation techniques during functional activities with verbal cues within 7 day(s). Outcome: Progressing Towards Goal   OCCUPATIONAL THERAPY TREATMENT  Patient: Verena Hoffman (85 y.o. female)  Date: 1/25/2022  Diagnosis: CHF exacerbation ([facility] [address] [I50.9]  Acute respiratory failure with hypoxia ([facility] [address] [J96.01]  Pulmonary edema [J81.1]  Atrial fibrillation with RVR ([facility] [address] [I48.91]  Acute on chronic systolic and diastolic heart failure, NYHA class 4 ([facility] [address] [I50.43] Pulmonary edema       Precautions: Fall,Contact,Skin (droplet plus)  Chart, occupational therapy assessment, plan of care, and goals were reviewed. ASSESSMENT  Patient continues with skilled OT services and is progressing towards goals. Patient received reclined in bed, amenable to session. Completed transfer to EOB and basic grooming after encouragement. Required max A for transfer to EOB and Min a to maintain balance while complete ADL. Encouraged trial to stand, however patient continued to defer. Returned to bedlevel, left with all needs in reach, NAD. Patient will otninue to benefit from SNF upon discharge to maximize ADL independence and safety. Current Level of Function Impacting Discharge (ADLs): max a adl/mobility    Other factors to consider for discharge: below baseline         PLAN :  Patient continues to benefit from skilled intervention to address the above impairments. Continue treatment per established plan of care to address goals. Recommend with staff:HOB elevated or bed in chair position for meals if tolerated    Recommend next OT session: EOB ADL, sit to stand with assist    Recommendation for discharge: (in order for the patient to meet his/her long term goals)  Therapy up to 5 days/week in SNF setting    This discharge recommendation:  Has been made in collaboration with the attending provider and/or case management    IF patient discharges home will need the following DME: patient owns DME required for discharge       SUBJECTIVE:   Patient stated vanilla cake would be better.  in regards to if she needed any water    OBJECTIVE DATA SUMMARY:   Cognitive/Behavioral Status:  Neurologic State: Alert  Orientation Level: Oriented X4  Cognition: Follows commands  Perception: Appears intact  Perseveration: No perseveration noted  Safety/Judgement: Decreased awareness of need for assistance;Decreased awareness of need for safety    Functional Mobility and Transfers for ADLs:  Bed Mobility:  Supine to Sit: Maximum assistance  Sit to Supine: Maximum assistance         Balance:  Sitting: Impaired; With support  Sitting - Static: Fair (occasional)  Sitting - Dynamic: Fair (occasional)    ADL Intervention:  Feeding  Feeding Assistance: Set-up  Drink to Mouth: Set-up; Supervision    Grooming  Position Performed: Seated edge of bed  Washing Face: Minimum assistance  Brushing/Combing Hair: Minimum assistance  Cues: Verbal cues provided;Physical assistance                   Lower Body Dressing Assistance  Socks: Total assistance (dependent)  Position Performed: Supine  Cues: Physical assistance         Cognitive Retraining  Safety/Judgement: Decreased awareness of need for assistance;Decreased awareness of need for safety      Pain:  None noted    Activity Tolerance:   Fair and requires rest breaks    After treatment patient left in no apparent distress:   Supine in bed, Heels elevated for pressure relief, Call bell within reach, and Side rails x 3    COMMUNICATION/COLLABORATION:   The patients plan of care was discussed with: Registered nurse.      Prudencio Green OT  Time Calculation: 25 mins

## 2022-01-25 NOTE — PROGRESS NOTES
Nephrology Progress Note  Norma Longest  Date of Admission : 1/15/2022    CC:  Follow up for MORENO on CKD       Assessment and Plan     MORENO on CKD:  - diff: progressive disease vs CRS  - stable renal function, diuretics on hold since Sunday  - Cr stable  - resume oral bumex 1mg po BID  - ok for d/c from renal standpoint     Afib with RVR  - per cards     CKD IV:  - baseline increasing, likely around 1.6 to 1.8 now     COVID-19 +    HTN:  - BP stable    Anemia:  - w/u underway  - heme following     Encephalopathy:  - improving    Acute on Chronic HFpEF  CAD, hx of CABG  - ECHO w/ EF 55-60%        Parkinsons  Hx of CVA  PAD w/ L CEA  AAA       Interval History:  Not examined in the room. Per RN and hospitalist, stable. Cr stable. BP stable. For possible dispo soon. Current Medications: all current  Medications have been eviewed in EPIC  Review of Systems: Pertinent items are noted in HPI. Objective:  Vitals:    Vitals:    01/25/22 0709 01/25/22 0952 01/25/22 0953 01/25/22 1000   BP: (!) 161/80      Pulse: 90   87   Resp: 11      Temp: 98.1 °F (36.7 °C)      SpO2: 97%  98%    Weight:  75.2 kg (165 lb 12.6 oz)     Height:  5' 5\" (1.651 m)       Intake and Output:  01/25 0701 - 01/25 1900  In: 747 [P.O.:375]  Out: 800 [Urine:800]  01/23 1901 - 01/25 0700  In: 450 [P.O.:450]  Out: 9015 [Urine:1750]    Physical Examination:  Not examined in the room due to COVID isolation:    General: NAD per RN  Neck:  Supple, no mass  Resp:  Stable oxygenation  CV:  RRR on monitor  Neurologic:  confused  Psych:             Unable to assess   :  No pittman    []    High complexity decision making was performed  []    Patient is at high-risk of decompensation with multiple organ involvement    Lab Data Personally Reviewed: I have reviewed all the pertinent labs, microbiology data and radiology studies during assessment.     Recent Labs     01/25/22  0507 01/24/22  0353 01/23/22  0413   * 134* 132*   K 4.4 4.3 4.2  101 101   CO2 24 25 26   * 178* 178*   BUN 64* 68* 66*   CREA 1.79* 2.00* 2.06*   CA 9.1 9.2 9.5   PHOS 3.8 3.8 4.3   ALB 3.2* 3.3* 3.7     Recent Labs     01/25/22  0507 01/24/22  0353   WBC 4.0 3.5*   HGB 8.6* 7.8*   HCT 26.2* 24.5*    233     No results found for: SDES  Lab Results   Component Value Date/Time    Culture result: NO GROWTH 5 DAYS 01/07/2022 08:37 PM    Culture result: MRSA NOT PRESENT 12/10/2021 06:53 AM    Culture result:  12/10/2021 06:53 AM     Screening of patient nares for MRSA is for surveillance purposes and, if positive, to facilitate isolation considerations in high risk settings. It is not intended for automatic decolonization interventions per se as regimens are not sufficiently effective to warrant routine use. Recent Results (from the past 24 hour(s))   GLUCOSE, POC    Collection Time: 01/24/22 11:31 AM   Result Value Ref Range    Glucose (POC) 169 (H) 65 - 117 mg/dL    Performed by Brenna HOWARD(CON)    GLUCOSE, POC    Collection Time: 01/24/22  4:02 PM   Result Value Ref Range    Glucose (POC) 218 (H) 65 - 117 mg/dL    Performed by Brenna HOWARD(CON)    GLUCOSE, POC    Collection Time: 01/24/22  9:07 PM   Result Value Ref Range    Glucose (POC) 230 (H) 65 - 117 mg/dL    Performed by Nestor Randolph    CBC WITH AUTOMATED DIFF    Collection Time: 01/25/22  5:07 AM   Result Value Ref Range    WBC 4.0 3.6 - 11.0 K/uL    RBC 2.68 (L) 3.80 - 5.20 M/uL    HGB 8.6 (L) 11.5 - 16.0 g/dL    HCT 26.2 (L) 35.0 - 47.0 %    MCV 97.8 80.0 - 99.0 FL    MCH 32.1 26.0 - 34.0 PG    MCHC 32.8 30.0 - 36.5 g/dL    RDW 13.0 11.5 - 14.5 %    PLATELET 260 125 - 602 K/uL    MPV 9.3 8.9 - 12.9 FL    NRBC 0.0 0  WBC    ABSOLUTE NRBC 0.00 0.00 - 0.01 K/uL    NEUTROPHILS 86 (H) 32 - 75 %    LYMPHOCYTES 6 (L) 12 - 49 %    MONOCYTES 7 5 - 13 %    EOSINOPHILS 0 0 - 7 %    BASOPHILS 0 0 - 1 %    IMMATURE GRANULOCYTES 1 (H) 0.0 - 0.5 %    ABS. NEUTROPHILS 3.5 1.8 - 8.0 K/UL    ABS. LYMPHOCYTES 0.2 (L) 0.8 - 3.5 K/UL    ABS. MONOCYTES 0.3 0.0 - 1.0 K/UL    ABS. EOSINOPHILS 0.0 0.0 - 0.4 K/UL    ABS. BASOPHILS 0.0 0.0 - 0.1 K/UL    ABS. IMM. GRANS. 0.0 0.00 - 0.04 K/UL    DF SMEAR SCANNED      RBC COMMENTS MACROCYTOSIS  1+        RBC COMMENTS OVALOCYTES  PRESENT        RBC COMMENTS TRENT CELLS  PRESENT       RENAL FUNCTION PANEL    Collection Time: 01/25/22  5:07 AM   Result Value Ref Range    Sodium 132 (L) 136 - 145 mmol/L    Potassium 4.4 3.5 - 5.1 mmol/L    Chloride 100 97 - 108 mmol/L    CO2 24 21 - 32 mmol/L    Anion gap 8 5 - 15 mmol/L    Glucose 160 (H) 65 - 100 mg/dL    BUN 64 (H) 6 - 20 MG/DL    Creatinine 1.79 (H) 0.55 - 1.02 MG/DL    BUN/Creatinine ratio 36 (H) 12 - 20      GFR est AA 33 (L) >60 ml/min/1.73m2    GFR est non-AA 27 (L) >60 ml/min/1.73m2    Calcium 9.1 8.5 - 10.1 MG/DL    Phosphorus 3.8 2.6 - 4.7 MG/DL    Albumin 3.2 (L) 3.5 - 5.0 g/dL   GLUCOSE, POC    Collection Time: 01/25/22  8:15 AM   Result Value Ref Range    Glucose (POC) 144 (H) 65 - 117 mg/dL    Performed by Joselo Moore MD  [address]  Phone - [phone]   Fax - [phone]  [email or web address]. [email or web address]

## 2022-01-26 LAB
ATRIAL RATE: 71 BPM
CALCULATED R AXIS, ECG10: -23 DEGREES
CALCULATED T AXIS, ECG11: 146 DEGREES
DIAGNOSIS, 93000: NORMAL
GLUCOSE BLD STRIP.AUTO-MCNC: 168 MG/DL (ref 65–117)
GLUCOSE BLD STRIP.AUTO-MCNC: 189 MG/DL (ref 65–117)
GLUCOSE BLD STRIP.AUTO-MCNC: 230 MG/DL (ref 65–117)
GLUCOSE BLD STRIP.AUTO-MCNC: 270 MG/DL (ref 65–117)
Q-T INTERVAL, ECG07: 376 MS
QRS DURATION, ECG06: 138 MS
QTC CALCULATION (BEZET), ECG08: 472 MS
SERVICE CMNT-IMP: ABNORMAL
VENTRICULAR RATE, ECG03: 95 BPM

## 2022-01-26 PROCEDURE — 74011250637 HC RX REV CODE- 250/637: Performed by: HOSPITALIST

## 2022-01-26 PROCEDURE — 74011636637 HC RX REV CODE- 636/637: Performed by: HOSPITALIST

## 2022-01-26 PROCEDURE — 74011250637 HC RX REV CODE- 250/637: Performed by: NURSE PRACTITIONER

## 2022-01-26 PROCEDURE — 74011250636 HC RX REV CODE- 250/636: Performed by: HOSPITALIST

## 2022-01-26 PROCEDURE — 82962 GLUCOSE BLOOD TEST: CPT

## 2022-01-26 PROCEDURE — 97535 SELF CARE MNGMENT TRAINING: CPT

## 2022-01-26 PROCEDURE — 94760 N-INVAS EAR/PLS OXIMETRY 1: CPT

## 2022-01-26 PROCEDURE — 65660000000 HC RM CCU STEPDOWN

## 2022-01-26 RX ORDER — TRAZODONE HYDROCHLORIDE 50 MG/1
25 TABLET ORAL ONCE
Status: COMPLETED | OUTPATIENT
Start: 2022-01-26 | End: 2022-01-26

## 2022-01-26 RX ORDER — LANOLIN ALCOHOL/MO/W.PET/CERES
3 CREAM (GRAM) TOPICAL
Status: DISCONTINUED | OUTPATIENT
Start: 2022-01-26 | End: 2022-01-27 | Stop reason: HOSPADM

## 2022-01-26 RX ADMIN — DILTIAZEM HYDROCHLORIDE 30 MG: 30 TABLET, FILM COATED ORAL at 12:25

## 2022-01-26 RX ADMIN — CILOSTAZOL 100 MG: 50 TABLET ORAL at 18:25

## 2022-01-26 RX ADMIN — BUMETANIDE 1 MG: 1 TABLET ORAL at 10:03

## 2022-01-26 RX ADMIN — CARBIDOPA AND LEVODOPA 2 TABLET: 25; 100 TABLET ORAL at 22:37

## 2022-01-26 RX ADMIN — PANTOPRAZOLE SODIUM 40 MG: 40 TABLET, DELAYED RELEASE ORAL at 07:30

## 2022-01-26 RX ADMIN — Medication 3 UNITS: at 12:25

## 2022-01-26 RX ADMIN — Medication 2 UNITS: at 07:30

## 2022-01-26 RX ADMIN — DULOXETINE 120 MG: 60 CAPSULE, DELAYED RELEASE ORAL at 10:03

## 2022-01-26 RX ADMIN — DILTIAZEM HYDROCHLORIDE 30 MG: 30 TABLET, FILM COATED ORAL at 07:30

## 2022-01-26 RX ADMIN — CARBIDOPA AND LEVODOPA 2 TABLET: 25; 100 TABLET ORAL at 17:54

## 2022-01-26 RX ADMIN — Medication 1000 UNITS: at 10:03

## 2022-01-26 RX ADMIN — DILTIAZEM HYDROCHLORIDE 30 MG: 30 TABLET, FILM COATED ORAL at 17:54

## 2022-01-26 RX ADMIN — CARVEDILOL 12.5 MG: 12.5 TABLET, FILM COATED ORAL at 10:03

## 2022-01-26 RX ADMIN — ARIPIPRAZOLE 20 MG: 5 TABLET ORAL at 10:02

## 2022-01-26 RX ADMIN — CARVEDILOL 12.5 MG: 12.5 TABLET, FILM COATED ORAL at 17:54

## 2022-01-26 RX ADMIN — CARBIDOPA AND LEVODOPA 2 TABLET: 25; 100 TABLET ORAL at 13:11

## 2022-01-26 RX ADMIN — BUMETANIDE 1 MG: 1 TABLET ORAL at 17:54

## 2022-01-26 RX ADMIN — VITAM B12 100 MCG: 100 TAB at 12:25

## 2022-01-26 RX ADMIN — DEXAMETHASONE 6 MG: 4 TABLET ORAL at 10:03

## 2022-01-26 RX ADMIN — SENNOSIDES AND DOCUSATE SODIUM 1 TABLET: 50; 8.6 TABLET ORAL at 22:37

## 2022-01-26 RX ADMIN — ATORVASTATIN CALCIUM 40 MG: 40 TABLET, FILM COATED ORAL at 22:37

## 2022-01-26 RX ADMIN — GUAIFENESIN 600 MG: 600 TABLET, EXTENDED RELEASE ORAL at 10:03

## 2022-01-26 RX ADMIN — CILOSTAZOL 100 MG: 50 TABLET ORAL at 07:30

## 2022-01-26 RX ADMIN — TRAZODONE HYDROCHLORIDE 25 MG: 50 TABLET ORAL at 23:19

## 2022-01-26 RX ADMIN — Medication 5 UNITS: at 17:54

## 2022-01-26 RX ADMIN — CLOPIDOGREL 75 MG: 75 TABLET, FILM COATED ORAL at 10:03

## 2022-01-26 RX ADMIN — CARBIDOPA AND LEVODOPA 2 TABLET: 25; 100 TABLET ORAL at 10:03

## 2022-01-26 RX ADMIN — GUAIFENESIN 600 MG: 600 TABLET, EXTENDED RELEASE ORAL at 22:37

## 2022-01-26 NOTE — PROGRESS NOTES
Problem: Self Care Deficits Care Plan (Adult)  Goal: *Acute Goals and Plan of Care (Insert Text)  Description: FUNCTIONAL STATUS PRIOR TO ADMISSION: Prior to recent admissions (most recent 1/7/2022), pt was functional at the w/c level and modified independent with functional transfers to and from the wheelchair. She was discharged 1/14/2022 to [facility] to receive therapy services with believed plan to transition back to LTC and readmitted 1/15/2022 for SOB. HOME SUPPORT: The patient lived at [facility], however following last admission pt resided in SNF to receive therapy services and required assist from staff for mobility/ADL/IADL tasks. Occupational Therapy Goals  Weekly re-assessment 1/24/2021 revised and continued as appropriate  1. Patient will perform seated grooming task with modified independence within 7 day(s). 2.  Patient will perform anterior bathing, seated, with min A within 7 day(s). 3.  Patient will perform lower body dressing with moderate assistance and using AE PRN within 7 day(s). 4.  Patient will perform toilet transfers with moderate assistance within 7 day(s). 5.  Patient will perform all aspects of toileting with moderate assistance  within 7 day(s). 6.  Patient will utilize energy conservation techniques during functional activities with verbal cues within 7 day(s). Initiated 1/17/2022  1. Patient will perform seated grooming task with modified independence within 7 day(s). 2.  Patient will perform anterior bathing, seated, with supervision/set up within 7 day(s). 3.  Patient will perform lower body dressing with moderate assistance and using AE PRN within 7 day(s). 4.  Patient will perform toilet transfers with moderate assistance within 7 day(s). 5.  Patient will perform all aspects of toileting with moderate assistance  within 7 day(s).   6.  Patient will utilize energy conservation techniques during functional activities with verbal cues within 7 day(s). Outcome: Progressing Towards Goal     OCCUPATIONAL THERAPY TREATMENT  Patient: Ramona Benavides (65 y.o. female)  Date: 1/26/2022  Diagnosis: CHF exacerbation ([facility] [address] [I50.9]  Acute respiratory failure with hypoxia ([address] [J96.01]  Pulmonary edema [J81.1]  Atrial fibrillation with RVR ([facility] [address] [I48.91]  Acute on chronic systolic and diastolic heart failure, NYHA class 4 ([address] [I50.43] Pulmonary edema       Precautions: Fall,Contact,Skin (droplet plus)  Chart, occupational therapy assessment, plan of care, and goals were reviewed. ASSESSMENT  Patient continues with skilled OT services and is progressing towards goals. Pt received in bed and progressed to the EOB for grooming activities. She needed mod A to come to EOB and then to return supine in bed. She was able to sit EOB for 15 minutes unsupported for grooming activities. She needs encouragement to progress with activities but once sitting, she was eager to engage with ADL activities. She will continue to benefit from rehab at discharge as she is well below her functional baseline. Current Level of Function Impacting Discharge (ADLs): mod A for grooming, difficulty with reaching the back of her head    Other factors to consider for discharge: debility, COVID +         PLAN :  Patient continues to benefit from skilled intervention to address the above impairments. Continue treatment per established plan of care to address goals. Recommend with staff: Recommend bed in chair position for all meals. Recommend next OT session: Continue with established plan of care    Recommendation for discharge: (in order for the patient to meet his/her long term goals)  Therapy up to 5 days/week in SNF setting    This discharge recommendation:  Has been made in collaboration with the attending provider and/or case management    IF patient discharges home will need the following DME: none       SUBJECTIVE:   Patient stated I am feeling alright.   Did you bring me any cake? Ana Stoddard    OBJECTIVE DATA SUMMARY:   Cognitive/Behavioral Status:  Neurologic State: Alert  Orientation Level: Oriented X4  Cognition: Memory loss; Follows commands  Perception: Appears intact  Perseveration: No perseveration noted  Safety/Judgement: Decreased awareness of need for safety;Decreased insight into deficits    Functional Mobility and Transfers for ADLs:  Bed Mobility:  Rolling: Moderate assistance  Supine to Sit: Moderate assistance  Sit to Supine: Maximum assistance    Transfers:  Sit to Stand:  (declined to complete standing )          Balance:  Sitting: Intact; With support  Sitting - Static: Fair (occasional)  Sitting - Dynamic: Fair (occasional)  Standing:  (unable to progress with standing)    ADL Intervention:     Eager to progress with sitting EOB for grooming. Grooming  Grooming Assistance: Minimum assistance (sitting On the EOB unsupported)  Position Performed: Seated edge of bed  Washing Face: Set-up  Washing Hands: Set-up  Brushing Teeth: Set-up  Brushing/Combing Hair: Set-up (needs help to reach the back of her head)       Cognitive Retraining  Safety/Judgement: Decreased awareness of need for safety;Decreased insight into deficits      Pain:  No pain    Activity Tolerance:   Good    After treatment patient left in no apparent distress:   Supine in bed, Heels elevated for pressure relief, and Call bell within reach    COMMUNICATION/COLLABORATION:   The patients plan of care was discussed with: Physical therapist and Registered nurse.      Abel Alvarez OT  Time Calculation: 35 mins

## 2022-01-26 NOTE — PROGRESS NOTES
ADNREA:  1. RUR-31%  2. [facility] accepted, bed available on Thursday. 3. BLS transport on discharge, [facility] placed on WILL CALL for 1/27. CM noted patient admitted from [facility], however she does not want to return. She requested for referral sent to [facility], bed available on Thursday.     Yadira Prabhakar, [facility]

## 2022-01-26 NOTE — PROGRESS NOTES
2310:  Attempted to call report. 2335:  TRANSFER - OUT REPORT:    Verbal report given to LAMONT Conde(name) on [employer]  being transferred to [facility](unit) for routine progression of care       Report consisted of patients Situation, Background, Assessment and   Recommendations(SBAR). Information from the following report(s) SBAR, Kardex, Intake/Output, MAR, Recent Results and Cardiac Rhythm a. fib was reviewed with the receiving nurse. Lines:   Peripheral IV 01/15/22 Left Antecubital (Active)   Site Assessment Clean, dry, & intact 01/25/22 2234   Phlebitis Assessment 0 01/25/22 2234   Infiltration Assessment 0 01/25/22 2234   Dressing Status Clean, dry, & intact 01/25/22 2234   Dressing Type Transparent;Tape 01/25/22 2234   Hub Color/Line Status Pink;Flushed;Capped 01/25/22 2234   Action Taken Open ports on tubing capped 01/25/22 2234   Alcohol Cap Used Yes 01/25/22 2234       Peripheral IV 01/16/22 Posterior;Right Hand (Active)   Site Assessment Clean, dry, & intact 01/25/22 2234   Phlebitis Assessment 0 01/25/22 2234   Infiltration Assessment 0 01/25/22 2234   Dressing Status Clean, dry, & intact 01/25/22 2234   Dressing Type Transparent;Tape 01/25/22 2234   Hub Color/Line Status Blue;Flushed;Capped 01/25/22 2234   Action Taken Open ports on tubing capped 01/25/22 2234   Alcohol Cap Used Yes 01/25/22 2234        Opportunity for questions and clarification was provided. Patient transported with:  Chart    2355:   Pt leaving floor with transport. Called [facility] w/ update.

## 2022-01-26 NOTE — PROGRESS NOTES
Per Dr. Mikael Hubbard, patient my be discharged today to SNF if bed is available; therefore, patient did not need CBC and Renal Panel labs.

## 2022-01-26 NOTE — PROGRESS NOTES
Nephrology Progress Note  Hernando Armijo  Date of Admission : 1/15/2022    CC:  Follow up for MORENO on CKD       Assessment and Plan     MORENO on CKD:  - diff: progressive disease vs CRS  - stable renal function yesterday, no labs today  - cont current dose of bumex 1mg po BID upon d/c  - ok for d/c from renal standpoint  - will need f/u with us in 2 weeks post d/c     CKD IV:  - baseline increasing, likely around 1.6 to 1.8 now     COVID-19 +    HTN:  - BP stable    Anemia:  - hgb stable    Acute on Chronic HFpEF  CAD, hx of CABG  - ECHO w/ EF 55-60%        Parkinsons  Hx of CVA  PAD w/ L CEA  AAA       Interval History:  Not examined in the room. Per RN and hospitalist, stable. No labs today. Good UOP. Possible d/c to SNF    Current Medications: all current  Medications have been eviewed in EPIC  Review of Systems: Pertinent items are noted in HPI. Objective:  Vitals:    Vitals:    01/25/22 2200 01/26/22 0026 01/26/22 0730 01/26/22 0815   BP: (!) 153/73 128/75  (!) 162/81   Pulse: 85 95 98 (!) 107   Resp: 14 20  18   Temp: 97.9 °F (36.6 °C) 98.1 °F (36.7 °C)  98 °F (36.7 °C)   SpO2:  95%  96%   Weight:  76.8 kg (169 lb 6.4 oz)     Height:         Intake and Output:  01/26 0701 - 01/26 1900  In: -   Out: 250 [Urine:250]  01/24 1901 - 01/26 0700  In: 900 [P.O.:900]  Out: 2550 [Urine:2550]    Physical Examination:  Not examined in the room due to COVID isolation:    General: NAD per RN  Neck:  Supple, no mass  Resp:  Stable oxygenation  CV:  RRR on monitor  Neurologic:  confused  Psych:             Unable to assess   :  No pittman    []    High complexity decision making was performed  []    Patient is at high-risk of decompensation with multiple organ involvement    Lab Data Personally Reviewed: I have reviewed all the pertinent labs, microbiology data and radiology studies during assessment.     Recent Labs     01/25/22  0507 01/24/22  0353   * 134*   K 4.4 4.3    101   CO2 24 25   * 178*   BUN 64* 68*   CREA 1.79* 2.00*   CA 9.1 9.2   PHOS 3.8 3.8   ALB 3.2* 3.3*     Recent Labs     01/25/22  0507 01/24/22  0353   WBC 4.0 3.5*   HGB 8.6* 7.8*   HCT 26.2* 24.5*    233     No results found for: SDES  Lab Results   Component Value Date/Time    Culture result: NO GROWTH 5 DAYS 01/07/2022 08:37 PM    Culture result: MRSA NOT PRESENT 12/10/2021 06:53 AM    Culture result:  12/10/2021 06:53 AM     Screening of patient nares for MRSA is for surveillance purposes and, if positive, to facilitate isolation considerations in high risk settings. It is not intended for automatic decolonization interventions per se as regimens are not sufficiently effective to warrant routine use.      Recent Results (from the past 24 hour(s))   GLUCOSE, POC    Collection Time: 01/25/22 11:59 AM   Result Value Ref Range    Glucose (POC) 202 (H) 65 - 117 mg/dL    Performed by Talya Haskins    EKG, 12 LEAD, INITIAL    Collection Time: 01/25/22  4:24 PM   Result Value Ref Range    Ventricular Rate 95 BPM    Atrial Rate 71 BPM    QRS Duration 138 ms    Q-T Interval 376 ms    QTC Calculation (Bezet) 472 ms    Calculated R Axis -23 degrees    Calculated T Axis 146 degrees    Diagnosis       Atrial fibrillation  Left bundle branch block  When compared with ECG of 10-RUCHI-2022 21:31,  QRS axis shifted right     GLUCOSE, POC    Collection Time: 01/25/22  4:54 PM   Result Value Ref Range    Glucose (POC) 231 (H) 65 - 117 mg/dL    Performed by [address] [place] [id number], POC    Collection Time: 01/25/22 10:05 PM   Result Value Ref Range    Glucose (POC) 235 (H) 65 - 117 mg/dL    Performed by Nadege Callaway [address], POC    Collection Time: 01/26/22  7:26 AM   Result Value Ref Range    Glucose (POC) 168 (H) 65 - 117 mg/dL    Performed by Hamilton Morales MD  [facility]   [address] [address]  Phone - [phone]   Fax - [phone]  [email or web address]

## 2022-01-27 VITALS
HEART RATE: 107 BPM | TEMPERATURE: 97.7 F | DIASTOLIC BLOOD PRESSURE: 79 MMHG | RESPIRATION RATE: 18 BRPM | OXYGEN SATURATION: 95 % | SYSTOLIC BLOOD PRESSURE: 161 MMHG | WEIGHT: 166.9 LBS | BODY MASS INDEX: 27.81 KG/M2 | HEIGHT: 65 IN

## 2022-01-27 LAB
GLUCOSE BLD STRIP.AUTO-MCNC: 177 MG/DL (ref 65–117)
GLUCOSE BLD STRIP.AUTO-MCNC: 206 MG/DL (ref 65–117)
SERVICE CMNT-IMP: ABNORMAL
SERVICE CMNT-IMP: ABNORMAL

## 2022-01-27 PROCEDURE — 74011636637 HC RX REV CODE- 636/637: Performed by: HOSPITALIST

## 2022-01-27 PROCEDURE — 74011250636 HC RX REV CODE- 250/636: Performed by: HOSPITALIST

## 2022-01-27 PROCEDURE — 74011250637 HC RX REV CODE- 250/637: Performed by: HOSPITALIST

## 2022-01-27 PROCEDURE — 74011250637 HC RX REV CODE- 250/637: Performed by: NURSE PRACTITIONER

## 2022-01-27 PROCEDURE — 82962 GLUCOSE BLOOD TEST: CPT

## 2022-01-27 RX ORDER — BUMETANIDE 1 MG/1
1 TABLET ORAL 2 TIMES DAILY
Qty: 60 TABLET | Refills: 0 | Status: SHIPPED | OUTPATIENT
Start: 2022-01-27 | End: 2022-02-26

## 2022-01-27 RX ADMIN — BUMETANIDE 1 MG: 1 TABLET ORAL at 09:28

## 2022-01-27 RX ADMIN — ARIPIPRAZOLE 20 MG: 5 TABLET ORAL at 09:27

## 2022-01-27 RX ADMIN — CLOPIDOGREL 75 MG: 75 TABLET, FILM COATED ORAL at 09:27

## 2022-01-27 RX ADMIN — CARBIDOPA AND LEVODOPA 2 TABLET: 25; 100 TABLET ORAL at 09:27

## 2022-01-27 RX ADMIN — CILOSTAZOL 100 MG: 50 TABLET ORAL at 06:44

## 2022-01-27 RX ADMIN — GUAIFENESIN 600 MG: 600 TABLET, EXTENDED RELEASE ORAL at 09:27

## 2022-01-27 RX ADMIN — PANTOPRAZOLE SODIUM 40 MG: 40 TABLET, DELAYED RELEASE ORAL at 06:40

## 2022-01-27 RX ADMIN — DULOXETINE 120 MG: 60 CAPSULE, DELAYED RELEASE ORAL at 09:28

## 2022-01-27 RX ADMIN — Medication 1000 UNITS: at 09:27

## 2022-01-27 RX ADMIN — Medication 2 UNITS: at 06:40

## 2022-01-27 RX ADMIN — DILTIAZEM HYDROCHLORIDE 30 MG: 30 TABLET, FILM COATED ORAL at 06:40

## 2022-01-27 RX ADMIN — CARBIDOPA AND LEVODOPA 2 TABLET: 25; 100 TABLET ORAL at 12:04

## 2022-01-27 RX ADMIN — CARVEDILOL 12.5 MG: 12.5 TABLET, FILM COATED ORAL at 09:27

## 2022-01-27 RX ADMIN — DEXAMETHASONE 6 MG: 4 TABLET ORAL at 09:27

## 2022-01-27 RX ADMIN — Medication 3 UNITS: at 12:03

## 2022-01-27 RX ADMIN — DILTIAZEM HYDROCHLORIDE 30 MG: 30 TABLET, FILM COATED ORAL at 12:04

## 2022-01-27 RX ADMIN — VITAM B12 100 MCG: 100 TAB at 09:27

## 2022-01-27 NOTE — PROGRESS NOTES
Nephrology Progress Note  Jeevan Samayoa  Date of Admission : 1/15/2022    CC:  Follow up for MORENO on CKD       Assessment and Plan     MORENO on CKD:  - diff: progressive disease vs CRS  - No labs in 2 days  - cont bumex 1 mg BID  - ok for d/c from renal standpoint  - will need f/u with us in 2 weeks post d/c     CKD IV:  - baseline increasing, likely around 1.6 to 1.8 now     COVID-19 +    HTN:  - BP stable    Anemia:  - hgb stable    Acute on Chronic HFpEF  CAD, hx of CABG  - ECHO w/ EF 55-60%        Parkinsons  Hx of CVA  PAD w/ L CEA  AAA       Interval History:  Not examined in the room. No new events per staff. No labs in 2 days. BP labile. awaiitng placement at this time     Current Medications: all current  Medications have been eviewed in EPIC  Review of Systems: Review of systems not obtained due to patient factors. Objective:  Vitals:    Vitals:    01/26/22 0815 01/26/22 1445 01/26/22 1954 01/27/22 0054   BP: (!) 162/81 139/74 (!) 168/85 (!) 154/76   Pulse: (!) 107 92 (!) 112 99   Resp: 18 18 18 18   Temp: 98 °F (36.7 °C) 97.3 °F (36.3 °C) 98 °F (36.7 °C) 98.1 °F (36.7 °C)   SpO2: 96% 97% 95% 95%   Weight:    75.7 kg (166 lb 14.4 oz)   Height:         Intake and Output:  No intake/output data recorded. 01/25 1901 - 01/27 0700  In: 340 [P.O.:340]  Out: 3800 [Urine:3800]    Physical Examination:  Not examined in the room due to COVID isolation:    General: NAD per RN  Neck:  Supple, no mass  Resp:  Stable oxygenation  CV:  RRR on monitor  Neurologic:  confused  Psych:             Unable to assess   :  No pittman    []    High complexity decision making was performed  []    Patient is at high-risk of decompensation with multiple organ involvement    Lab Data Personally Reviewed: I have reviewed all the pertinent labs, microbiology data and radiology studies during assessment.     Recent Labs     01/25/22  0507   *   K 4.4      CO2 24   *   BUN 64*   CREA 1.79*   CA 9.1   PHOS 3.8 ALB 3.2*     Recent Labs     01/25/22  0507   WBC 4.0   HGB 8.6*   HCT 26.2*        No results found for: SDES  Lab Results   Component Value Date/Time    Culture result: NO GROWTH 5 DAYS 01/07/2022 08:37 PM    Culture result: MRSA NOT PRESENT 12/10/2021 06:53 AM    Culture result:  12/10/2021 06:53 AM     Screening of patient nares for MRSA is for surveillance purposes and, if positive, to facilitate isolation considerations in high risk settings. It is not intended for automatic decolonization interventions per se as regimens are not sufficiently effective to warrant routine use. Recent Results (from the past 24 hour(s))   GLUCOSE, POC    Collection Time: 01/26/22 11:21 AM   Result Value Ref Range    Glucose (POC) 230 (H) 65 - 117 mg/dL    Performed by [address], POC    Collection Time: 01/26/22  4:38 PM   Result Value Ref Range    Glucose (POC) 270 (H) 65 - 117 mg/dL    Performed by [address], POC    Collection Time: 01/26/22 10:36 PM   Result Value Ref Range    Glucose (POC) 189 (H) 65 - 117 mg/dL    Performed by [address], POC    Collection Time: 01/27/22  6:39 AM   Result Value Ref Range    Glucose (POC) 177 (H) 65 - 117 mg/dL    Performed by Crys Palencia MD  [address]  Phone - [phone]   Fax - [phone]  [email or web address]. [email or web address]

## 2022-01-27 NOTE — PROGRESS NOTES
Problem: Risk for Spread of Infection  Goal: Prevent transmission of infectious organism to others  Description: Prevent the transmission of infectious organisms to other patients, staff members, and visitors. Outcome: Progressing Towards Goal     Problem: Patient Education:  Go to Education Activity  Goal: Patient/Family Education  Outcome: Progressing Towards Goal     Problem: Falls - Risk of  Goal: *Absence of Falls  Description: Document Hasmukh Reyes Fall Risk and appropriate interventions in the flowsheet. Outcome: Progressing Towards Goal  Note: Fall Risk Interventions:  Mobility Interventions: Communicate number of staff needed for ambulation/transfer,OT consult for ADLs,Patient to call before getting OOB,PT Consult for mobility concerns,PT Consult for assist device competence,Utilize walker, cane, or other assistive device    Mentation Interventions: Adequate sleep, hydration, pain control,Evaluate medications/consider consulting pharmacy,More frequent rounding,Reorient patient,Toileting rounds,Update white board    Medication Interventions: Evaluate medications/consider consulting pharmacy    Elimination Interventions: Call light in reach,Patient to call for help with toileting needs,Toileting schedule/hourly rounds (purewick in place)              Problem: Patient Education: Go to Patient Education Activity  Goal: Patient/Family Education  Outcome: Progressing Towards Goal     Problem: Patient Education: Go to Patient Education Activity  Goal: Patient/Family Education  Outcome: Progressing Towards Goal     Problem: Patient Education: Go to Patient Education Activity  Goal: Patient/Family Education  Outcome: Progressing Towards Goal     Problem: Discharge Planning  Goal: *Discharge to safe environment  Outcome: Progressing Towards Goal     Problem: Pressure Injury - Risk of  Goal: *Prevention of pressure injury  Description: Document Gregorio Scale and appropriate interventions in the flowsheet.   Outcome: Progressing Towards Goal  Note: Pressure Injury Interventions:  Sensory Interventions: Assess changes in LOC,Assess need for specialty bed,Avoid rigorous massage over bony prominences,Check visual cues for pain,Keep linens dry and wrinkle-free,Maintain/enhance activity level,Minimize linen layers,Pressure redistribution bed/mattress (bed type),Turn and reposition approx. every two hours (pillows and wedges if needed)    Moisture Interventions: Absorbent underpads,Apply protective barrier, creams and emollients,Check for incontinence Q2 hours and as needed,Internal/External urinary devices,Maintain skin hydration (lotion/cream),Minimize layers,Moisture barrier,Offer toileting Q_hr    Activity Interventions: Increase time out of bed,Pressure redistribution bed/mattress(bed type),PT/OT evaluation    Mobility Interventions: Assess need for specialty bed,Float heels,HOB 30 degrees or less,Pressure redistribution bed/mattress (bed type),PT/OT evaluation,Turn and reposition approx. every two hours(pillow and wedges)    Nutrition Interventions: Document food/fluid/supplement intake    Friction and Shear Interventions: Apply protective barrier, creams and emollients,HOB 30 degrees or less,Lift sheet,Minimize layers                Problem: Patient Education: Go to Patient Education Activity  Goal: Patient/Family Education  Outcome: Progressing Towards Goal     Problem: Airway Clearance - Ineffective  Goal: Achieve or maintain patent airway  Outcome: Progressing Towards Goal     Problem: Gas Exchange - Impaired  Goal: Absence of hypoxia  Outcome: Progressing Towards Goal  Goal: Promote optimal lung function  Outcome: Progressing Towards Goal     Problem: Breathing Pattern - Ineffective  Goal: Ability to achieve and maintain a regular respiratory rate  Outcome: Progressing Towards Goal     Problem:  Body Temperature -  Risk of, Imbalanced  Goal: Ability to maintain a body temperature within defined limits  Outcome: Progressing Towards Goal  Goal: Will regain or maintain usual level of consciousness  Outcome: Progressing Towards Goal  Goal: Complications related to the disease process, condition or treatment will be avoided or minimized  Outcome: Progressing Towards Goal     Problem: Isolation Precautions - Risk of Spread of Infection  Goal: Prevent transmission of infectious organism to others  Outcome: Progressing Towards Goal     Problem: Nutrition Deficits  Goal: Optimize nutrtional status  Outcome: Progressing Towards Goal     Problem: Risk for Fluid Volume Deficit  Goal: Maintain normal heart rhythm  Outcome: Progressing Towards Goal  Goal: Maintain absence of muscle cramping  Outcome: Progressing Towards Goal  Goal: Maintain normal serum potassium, sodium, calcium, phosphorus, and pH  Outcome: Progressing Towards Goal     Problem: Loneliness or Risk for Loneliness  Goal: Demonstrate positive use of time alone when socialization is not possible  Outcome: Progressing Towards Goal     Problem: Fatigue  Goal: Verbalize increase energy and improved vitality  Outcome: Progressing Towards Goal     Problem: Patient Education: Go to Patient Education Activity  Goal: Patient/Family Education  Outcome: Progressing Towards Goal

## 2022-01-27 NOTE — PROGRESS NOTES
Transition of Care Plan to SNF/Rehab    SNF/Rehab Transition:  Patient has been accepted to [facility] and meets criteria for admission. Patient will transported by [facility] and expected to leave at 1600. Communication to Patient/Family:  Spoke with patient via telephone they are agreeable to plan. Communication to SNF/Rehab:  Bedside RN, Bianca Camara, has been notified to update the transition plan to the facility and call report (phone number [phone]). Discharge information has been updated on the AVS.              Nursing Please include all hard scripts for controlled substances, med rec and dc summary, and AVS in packet. Reviewed and confirmed with facility, [facility], can manage the patient care needs for the following:     [facility] ER with (X) only those applicable:    Medication:  []  Medications will be available at the facility  []  IV Antibiotics   []  Controlled Substance - hard copy to be sent with patient   []  Weekly Labs   Documents:  [] Hard RX  [] MAR  [] Kardex  [] AVS  []Transfer Summary  []Discharge   Equipment:  []  CPAP/BiPAP  []  Wound Vacuum  []  Sue or Urinary Device  []  PICC/Central Line  []  Nebulizer  []  Ventilator   Treatment:  []Isolation (for MRSA, VRE, etc.)  []Surgical Drain Management  []Tracheostomy Care  []Dressing Changes  []Dialysis with transportation and chair time. []PEG Care  []Oxygen  []Daily Weights for Heart Failure   Dietary:  []Any diet limitations  []Tube Feedings   []Total Parenteral Management (TPN)   Eligible for Medicaid Long Term Services and Supports  Yes:  [] Eligible for medical assistance or will become eligible within 180 days and UAI completed. [] Provider/Patient and/or support system has requested screening. [] UAI copy provided to patient or responsible party,  [] UAI unavailable at discharge will send once processed to SNF provider.   [] UAI unavailable at discharged mailed to patient  No:   [] Private pay and is not financially eligible for Medicaid within the next 180 days. [] Reside out-of-state. [] A residents of a state owned/operated facility that is licensed  by [facility] or [facility]  [] Enrollment in [facility] hospice services  []  Medical [facility]  [] Patient /Family declines to have screening completed or provide financial information for screening     Financial Resources:  Medicaid    [] Initiated and application pending   [] Full coverage     Advanced Care Plan:  []Surrogate Decision Maker of Care  []POA  []Communicated Code StatusDNR (DDNR\", \"Full\")    Other     Medicare pt has received, reviewed, and signed 2nd IM letter informing them of their right to appeal the discharge. Signed copy has been placed on pt bedside chart.     [facility]

## 2022-01-27 NOTE — PROGRESS NOTES
[address] [facility] Adult  Hospitalist Group                                                                                          Hospitalist Progress Note  Marieta Goodpasture, MD  Answering service: [phone] OR [phone] from in house phone        Date of Service:  2022  NAME:  Luis M Shannon  :  1940  MRN:  [id number]      Admission Summary:   [de-identified] yo female who was discharged from hospital on 1/15 after being admitted for CHF, Anemia/AFib/MORENO. Patient was on diuretics but then held on  due to hypotension. Pt had anemia and given 1 U PRBCs on . Pt's Eliquis stopped due to concern for bleeding. Patient's diuretics were not restarted and discharged to [facility]. Patient sent back to ER due to SOB. Interval history / Subjective:     Patient seen and examined    Waiting for rehab  Asked if she could get something for sleep         Assessment & Plan:     Atrial Fib. - Coreg  12.5 mg bid,on Cardizem IR 30 mg tid  - Recently taken off Apixaban  due to h/o GIB. EGD and colonoscopy on 2021 negative.  -Cardiology recommendation appreciated:PPM/AVN if rate difficult to control with medications and watchman as OP if unable to resume Apixaban       Pulmonary edema-resolved  Acute on chronic systolic CHF NYHA III  CAD s/p CABG/stent-c/w Plavix/Statin/BB  Hypertension    -- Bumex resumed . ON coreg, no ACEI/ARBs due to CKD/hypotension      Chronic anemia,macrocytic. Etiology not clear,yesenia multifactorial: nutritional, CKD? BM disorder. No overt GIB except when she had duodenal ulcer in . She needs further work up to delineate the cause of the anemia as this has become barrier to her being on anticoagulation for secondary stroke prevention. --She got blood on 2022 and 2021  --Stool studies have been negative for blood. --Recent EGD and colonoscopy without evidence of bleeding,ulcer etc except incidental findings of hiatal hernia,polyps.   SPEP,UPEP,IF nwl  B12 ,folate pending        Drowsiness/acute metabolic encephalopathy-resolved  History of left lacunar infarct/acute CVA  history of left carotid stenosis s/p CEA  -ABG unremarkable  -Noted between 04/2021 to 11/2021 Buspar/neurontin/abilify was added to Cymbalta. Per previous hospitalist, discontinued buspar and night time neurontin      MORENO on CKD 4-diuretics resumed-Appreciate nephrology    Parkinson's-c/w sinemet     Multiple hospitalizations. 5 admissions since 11/202. palliative care seen    CoVID +,chronic hypoxia: decadron 6 mg x10 days. Code status: DNR   DVT prophylaxis: SCDs        Care Plan discussed with: Ruben Medel. Anticipated Disposition: SNF/LTC       Hospital Problems  Date Reviewed: 1/17/2022          Codes Class Noted POA    * (Principal) Pulmonary edema ICD-10-CM: J81.1  ICD-9-CM: 207  1/16/2022 Yes        Atrial fibrillation with RVR ([address] ICD-10-CM: I48.91  ICD-9-CM: 427.31  1/16/2022 Unknown        Acute respiratory failure with hypoxia (HCC) ICD-10-CM: J96.01  ICD-9-CM: 518.81  1/16/2022 Unknown        Acute on chronic systolic and diastolic heart failure, NYHA class 4 (HCC) ICD-10-CM: I50.43  ICD-9-CM: 428.43  1/16/2022 Unknown        CHF exacerbation (HCC) ICD-10-CM: I50.9  ICD-9-CM: 428.0  1/15/2022 Unknown        Left bundle branch block ICD-10-CM: I44.7  ICD-9-CM: 426.3  1/1/2022 Unknown                Review of Systems:   A comprehensive review of systems was negative except for that written in the HPI. Vital Signs:    Last 24hrs VS reviewed since prior progress note.  Most recent are:  Visit Vitals  BP (!) 168/85 (BP 1 Location: Left arm, BP Patient Position: At rest)   Pulse (!) 112   Temp 98 °F (36.7 °C)   Resp 18   Ht 5' 5\" (1.651 m)   Wt 76.8 kg (169 lb 6.4 oz)   SpO2 95%   BMI 28.19 kg/m²         Intake/Output Summary (Last 24 hours) at 1/27/2022 0025  Last data filed at 1/26/2022 1445  Gross per 24 hour   Intake 340 ml   Output 1500 ml   Net -1160 ml        Physical Examination:     I had a face to face encounter with this patient and independently examined them on 1/27/2022 as outlined below:          Constitutional:  No acute distress, cooperative, pleasant    ENT:  Oral mucosa moist, oropharynx benign. Resp:  On oxygen via NC.decreased BS at bases   CV:  irregularly irregular ,normal rate at rest     GI:  Soft, non distended, non tender. normoactive bowel sounds,     Musculoskeletal: No LE  edema    Neurologic:  Moves all extremities. AAOx3, CN II-XII reviewed            Data Review:    Review and/or order of clinical lab test  Review and/or order of tests in the medicine section of [facility]      Labs:     Recent Labs     01/25/22  0507 01/24/22  0353   WBC 4.0 3.5*   HGB 8.6* 7.8*   HCT 26.2* 24.5*    233     Recent Labs     01/25/22  0507 01/24/22  0353   * 134*   K 4.4 4.3    101   CO2 24 25   BUN 64* 68*   CREA 1.79* 2.00*   * 178*   CA 9.1 9.2   PHOS 3.8 3.8     Recent Labs     01/25/22  0507 01/24/22  0353   ALB 3.2* 3.3*     No results for input(s): INR, PTP, APTT, INREXT, INREXT in the last 72 hours. No results for input(s): FE, TIBC, PSAT, FERR in the last 72 hours. Lab Results   Component Value Date/Time    Folate 7.8 01/25/2022 05:07 AM      No results for input(s): PH, PCO2, PO2 in the last 72 hours. No results for input(s): CPK, CKNDX, TROIQ in the last 72 hours.     No lab exists for component: CPKMB  Lab Results   Component Value Date/Time    Cholesterol, total 148 09/23/2020 04:27 AM    HDL Cholesterol 52 09/23/2020 04:27 AM    LDL, calculated 83.8 09/23/2020 04:27 AM    Triglyceride 61 09/23/2020 04:27 AM    CHOL/HDL Ratio 2.8 09/23/2020 04:27 AM     Lab Results   Component Value Date/Time    Glucose (POC) 189 (H) 01/26/2022 10:36 PM    Glucose (POC) 270 (H) 01/26/2022 04:38 PM    Glucose (POC) 230 (H) 01/26/2022 11:21 AM    Glucose (POC) 168 (H) 01/26/2022 07:26 AM    Glucose (POC) 235 (H) 01/25/2022 10:05 PM     Lab Results Component Value Date/Time    Color YELLOW/STRAW 12/30/2021 08:23 PM    Appearance CLOUDY (A) 12/30/2021 08:23 PM    Specific gravity 1.017 12/30/2021 08:23 PM    pH (UA) 6.0 12/30/2021 08:23 PM    Protein 100 (A) 12/30/2021 08:23 PM    Glucose Negative 12/30/2021 08:23 PM    Ketone Negative 12/30/2021 08:23 PM    Bilirubin Negative 12/30/2021 08:23 PM    Urobilinogen 1.0 12/30/2021 08:23 PM    Nitrites Negative 12/30/2021 08:23 PM    Leukocyte Esterase MODERATE (A) 12/30/2021 08:23 PM    Epithelial cells FEW 12/30/2021 08:23 PM    Bacteria 1+ (A) 12/30/2021 08:23 PM    WBC 0-4 12/30/2021 08:23 PM    RBC 0-5 12/30/2021 08:23 PM         Medications Reviewed:     Current Facility-Administered Medications   Medication Dose Route Frequency    melatonin tablet 3 mg  3 mg Oral QHS    bumetanide (BUMEX) tablet 1 mg  1 mg Oral BID    dexAMETHasone (DECADRON) tablet 6 mg  6 mg Oral DAILY    carvediloL (COREG) tablet 12.5 mg  12.5 mg Oral BID WITH MEALS    guaiFENesin ER (MUCINEX) tablet 600 mg  600 mg Oral Q12H    dilTIAZem IR (CARDIZEM) tablet 30 mg  30 mg Oral TIDAC    busPIRone (BUSPAR) tablet 5 mg  5 mg Oral TID PRN    insulin lispro (HUMALOG) injection   SubCUTAneous AC&HS    glucose chewable tablet 16 g  4 Tablet Oral PRN    dextrose (D50W) injection syrg 12.5-25 g  12.5-25 g IntraVENous PRN    glucagon (GLUCAGEN) injection 1 mg  1 mg IntraMUSCular PRN    ARIPiprazole (ABILIFY) tablet 20 mg  20 mg Oral DAILY    atorvastatin (LIPITOR) tablet 40 mg  40 mg Oral QHS    carbidopa-levodopa (SINEMET)  mg per tablet 2 Tablet  2 Tablet Oral QID    cholecalciferol (VITAMIN D3) (1000 Units /25 mcg) tablet 1,000 Units  1,000 Units Oral DAILY    cilostazoL (PLETAL) tablet 100 mg  100 mg Oral ACB&D    clopidogreL (PLAVIX) tablet 75 mg  75 mg Oral DAILY AFTER BREAKFAST    cyanocobalamin (VITAMIN B12) tablet 100 mcg  100 mcg Oral DAILY    DULoxetine (CYMBALTA) capsule 120 mg  120 mg Oral DAILY    nitroglycerin (NITROSTAT) tablet 0.4 mg  0.4 mg SubLINGual Q5MIN PRN    pantoprazole (PROTONIX) tablet 40 mg  40 mg Oral ACB    polyethylene glycol (MIRALAX) packet 17 g  17 g Oral DAILY PRN    senna-docusate (PERICOLACE) 8.6-50 mg per tablet 1 Tablet  1 Tablet Oral QHS    albuterol-ipratropium (DUO-NEB) 2.5 MG-0.5 MG/3 ML  3 mL Nebulization Q6H PRN     ______________________________________________________________________  EXPECTED LENGTH OF STAY: 3d 19h  ACTUAL LENGTH OF STAY:          11                 Herminia Nissen, MD

## 2022-01-27 NOTE — PROGRESS NOTES
Problem: Risk for Spread of Infection  Goal: Prevent transmission of infectious organism to others  Description: Prevent the transmission of infectious organisms to other patients, staff members, and visitors. Outcome: Progressing Towards Goal     Problem: Falls - Risk of  Goal: *Absence of Falls  Description: Document Kalina Moseleyyadi Fall Risk and appropriate interventions in the flowsheet.   Outcome: Progressing Towards Goal  Note: Fall Risk Interventions:  Mobility Interventions: Communicate number of staff needed for ambulation/transfer    Mentation Interventions: Adequate sleep, hydration, pain control,More frequent rounding    Medication Interventions: Evaluate medications/consider consulting pharmacy    Elimination Interventions: Call light in reach,Bed/chair exit alarm       Problem: Patient Education: Go to Patient Education Activity  Goal: Patient/Family Education  Outcome: Progressing Towards Goal     Problem: Patient Education: Go to Patient Education Activity  Goal: Patient/Family Education  Outcome: Progressing Towards Goal     Problem: Discharge Planning  Goal: *Discharge to safe environment  Outcome: Progressing Towards Goal

## 2022-01-28 NOTE — DISCHARGE SUMMARY
.     Discharge Summary       PATIENT ID: Sherren Providence  MRN: [id number]   YOB: 1940    DATE OF ADMISSION: 1/15/2022  9:35 AM    DATE OF DISCHARGE: 1/27/2022  PRIMARY CARE PROVIDER: Nuria Millan DO     ATTENDING PHYSICIAN: Maida Richardson MD  DISCHARGING PROVIDER: Maida Richardson MD    To contact this individual call [phone] and ask the  to page. If unavailable ask to be transferred the Adult Hospitalist Department. CONSULTATIONS: IP CONSULT TO CARDIOLOGY  IP CONSULT TO NEPHROLOGY    PROCEDURES/SURGERIES: * No surgery found *    ADMITTING DIAGNOSES & HOSPITAL COURSE:   [de-identified] yo female who was discharged from hospital on 1/15 after being admitted for CHF, Anemia/AFib/MORENO. Patient was on diuretics but then held on 1/11 due to hypotension. Pt had anemia and given 1 U PRBCs on 1/11. Pt's Eliquis stopped due to concern for bleeding. Patient's diuretics were not restarted and discharged to [facility]. Patient sent back to ER due to SOB. \"    DISCHARGE DIAGNOSES / PLAN:      Chronic persistent atrial fibrillation. Coreg  12.5 mg bid,on Cardizem IR 30 mg tid  Recently taken off Apixaban  due to h/o GIB. EGD and colonoscopy on 12/17/2021 negative. Cardiology recommendation appreciated:PPM/AVN if rate difficult to control with medications and watchman as OP if unable to resume Apixaban      Acute pulmonary edema. Due to acute CHF, resolved. Acute on chronic systolic CHF NYHA III  CAD s/p CABG/stent-c/w Plavix/Statin/BB  Hypertension    Bumex resumed . ON coreg, no ACEI/ARBs due to CKD/hypotension     Chronic anemia,macrocytic. Etiology not clearyesenia multifactorial: nutritional, CKD? BM disorder. No overt GIB except when she had duodenal ulcer in 2014. She needs further work up to delineate the cause of the anemia as this has become barrier to her being on anticoagulation for secondary stroke prevention.   --She got blood on 1/11/2022 and 4/2021  --Stool studies have been negative for blood.  --Recent EGD and colonoscopy without evidence of bleeding,ulcer etc except incidental findings of hiatal hernia,polyps. SPEP,UPEP,IF nwl  B12 ,folate pending     Drowsiness/acute metabolic encephalopathy-resolved  History of left lacunar infarct/acute CVA  history of left carotid stenosis s/p CEA  -ABG unremarkable  -Noted between 2021 to 2021 Buspar/neurontin/abilify was added to Cymbalta. Per previous hospitalist, discontinued buspar and night time neurontin     MORENO on CKD 4-diuretics resumed-Appreciate nephrology     Parkinson's-c/w sinemet      Multiple hospitalizations. 5 admissions since . palliative care seen     CoVID +,chronic hypoxia: decadron 6 mg x10 days. PENDING TEST RESULTS:   At the time of discharge the following test results are still pendin  FOLLOW UP APPOINTMENTS:    Follow-up Information     Follow up With Specialties Details [address] [place] [address]   [address] [address]  [phone]    [address] [place] [address]   [address] [facility] [address] [place] [address] [address] [facility] [address] [phone]             ADDITIONAL CARE RECOMMENDATIONS: Outpatient PCP and cardiology follow-up. DIET: Cardiac Diet  Oral Nutritional Supplements: Boost Glucose ControlOnce daily    ACTIVITY: Activity as tolerated    DISCHARGE MEDICATIONS:  Discharge Medication List as of 2022  5:55 PM      START taking these medications    Details   bumetanide (BUMEX) 1 mg tablet Take 1 Tablet by mouth two (2) times a day for 30 days. , Normal, Disp-60 Tablet, R-0         CONTINUE these medications which have NOT CHANGED    Details   carvediloL (COREG) 25 mg tablet Take 1 Tablet by mouth two (2) times daily (with meals) for 30 days. , No Print, Disp-60 Tablet, R-0      dilTIAZem IR (CARDIZEM) 30 mg tablet Take 1 Tablet by mouth Before breakfast, lunch, and dinner for 30 days. , No Print, Disp-90 Tablet, R-0      ARIPiprazole (ABILIFY) 20 mg tablet Take 1 Tablet by mouth daily. , Print, Disp-30 Tablet, R-0      busPIRone (BUSPAR) 5 mg tablet Take 1 Tablet by mouth three (3) times daily. , Print, Disp-90 Tablet, R-0      DULoxetine (CYMBALTA) 60 mg capsule Take 2 Capsules by mouth daily. Indications: anxiousness associated with depression, Print, Disp-30 Capsule, R-0      senna-docusate (Senna with Docusate Sodium) 8.6-50 mg per tablet Take 1 Tablet by mouth nightly., No Print, Disp-30 Tablet, R-0      ondansetron (ZOFRAN ODT) 4 mg disintegrating tablet Take 1 Tablet by mouth every eight (8) hours as needed for Nausea or Vomiting., No Print, Disp-30 Tablet, R-0      cilostazoL (PLETAL) 100 mg tablet Take 100 mg by mouth Before breakfast and dinner., Historical Med      hydrALAZINE (APRESOLINE) 50 mg tablet Take 1 Tablet by mouth three (3) times daily. , Normal, Disp-90 Tablet, R-5      atorvastatin (LIPITOR) 40 mg tablet Take 1 Tablet by mouth nightly., Normal, Disp-30 Tablet, R-5      gabapentin (NEURONTIN) 100 mg capsule Take 100 mg by mouth nightly., Historical Med      melatonin 3 mg tablet Take 6 mg by mouth nightly., Historical Med      nitroglycerin (Nitrostat) 0.4 mg SL tablet 0.4 mg by SubLINGual route every five (5) minutes as needed for Chest Pain. Up to 3 doses. , Historical Med      clopidogreL (Plavix) 75 mg tab Take 75 mg by mouth daily (after breakfast). , Historical Med      carbidopa-levodopa (SINEMET)  mg per tablet Take 2 Tabs by mouth four (4) times daily. , Phone In, Disp-720 Tab, R-1      acetaminophen (TYLENOL) 325 mg tablet Take 650 mg by mouth every six (6) hours as needed for Pain or Fever., Historical Med      cyanocobalamin (VITAMIN B12) 100 mcg tablet Take 100 mcg by mouth daily. , Historical Med      Cholecalciferol, Vitamin D3, 1,000 unit cap Take 1,000 Units by mouth daily. , Historical Med      pantoprazole (PROTONIX) 40 mg tablet Take 40 mg by mouth Daily (before breakfast). Indications: h/o bleeding ulcer with nsaid, Historical Med      multivitamins-minerals-lutein (CENTRUM SILVER) tab tablet Take 1 Tablet by mouth daily. , Historical Med         STOP taking these medications       polyethylene glycol (MIRALAX) 17 gram packet Comments:   Reason for Stopping:                 NOTIFY YOUR PHYSICIAN FOR ANY OF THE FOLLOWING:   Fever over 101 degrees for 24 hours. Chest pain, shortness of breath, fever, chills, nausea, vomiting, diarrhea, change in mentation, falling, weakness, bleeding. Severe pain or pain not relieved by medications. Or, any other signs or symptoms that you may have questions about.     DISPOSITION:    Home With:   OT  PT  HH  RN       Long term SNF/Inpatient Rehab    Independent/assisted living    Hospice    Other:       PATIENT CONDITION AT DISCHARGE:     Functional status    Poor     Deconditioned     Independent      Cognition     Lucid     Forgetful     Dementia      Catheters/lines (plus indication)    Sue     PICC     PEG     None      Code status     Full code     DNR      PHYSICAL EXAMINATION AT DISCHARGE:   Refer to Progress Note while inpatient      CHRONIC MEDICAL DIAGNOSES:  Problem List as of 1/27/2022 Date Reviewed: 1/17/2022          Codes Class Noted - Resolved    * (Principal) Pulmonary edema ICD-10-CM: J81.1  ICD-9-CM: 784  1/16/2022 - Present        Atrial fibrillation with RVR ([facility] ICD-10-CM: I48.91  ICD-9-CM: 427.31  1/16/2022 - Present        Acute respiratory failure with hypoxia (HCC) ICD-10-CM: J96.01  ICD-9-CM: 518.81  1/16/2022 - Present        Acute on chronic systolic and diastolic heart failure, NYHA class 4 ([facility] ICD-10-CM: I50.43  ICD-9-CM: 428.43  1/16/2022 - Present        CHF exacerbation ([facility] ICD-10-CM: I50.9  ICD-9-CM: 428.0  1/15/2022 - Present        CHF (congestive heart failure) (HCC) ICD-10-CM: I50.9  ICD-9-CM: 428.0 1/8/2022 - Present        Left bundle branch block ICD-10-CM: I44.7  ICD-9-CM: 426.3  1/1/2022 - Present        CAP (community acquired pneumonia) ICD-10-CM: J18.9  ICD-9-CM: 486  12/10/2021 - Present        Acute on chronic respiratory failure with hypoxia (HCC) ICD-10-CM: J96.21  ICD-9-CM: 518.84, 799.02  12/10/2021 - Present        Cellulitis and abscess of lower extremity ICD-10-CM: L03.119, L02.419  ICD-9-CM: 682.6  10/29/2021 - Present        SOB (shortness of breath) ICD-10-CM: R06.02  ICD-9-CM: 786.05  4/23/2021 - Present        UTI (urinary tract infection) ICD-10-CM: N39.0  ICD-9-CM: 599.0  4/9/2021 - Present        Weakness ICD-10-CM: R53.1  ICD-9-CM: 780.79  5/4/2020 - Present        Generalized weakness ICD-10-CM: R53.1  ICD-9-CM: 780.79  4/30/2020 - Present        Carotid artery stenosis, symptomatic, left ICD-10-CM: I43.56  ICD-9-CM: 433.10  7/26/2019 - Present        HTN (hypertension) ICD-10-CM: I10  ICD-9-CM: 401.9  7/17/2019 - Present        Acute ischemic stroke ([facility] ICD-10-CM: I63.9  ICD-9-CM: 434.91  7/12/2019 - Present        Fall ICD-10-CM: Roni Shaheen. Colon Areola  ICD-9-CM: E888.9  12/24/2018 - Present        CKD (chronic kidney disease), stage III ([facility] ICD-10-CM: N18.30  ICD-9-CM: 585.3  7/8/2015 - Present        Edema ICD-10-CM: R60.9  ICD-9-CM: 782.3  5/6/2015 - Present        Diabetes mellitus ([facility] ICD-10-CM: E11.9  ICD-9-CM: 250.00  5/6/2015 - Present        Postoperative anemia due to acute blood loss ICD-10-CM: D62  ICD-9-CM: 285.1  2/14/2015 - Present        S/P CABG (coronary artery bypass graft) ICD-10-CM: Z95.1  ICD-9-CM: V45.81  2/13/2015 - Present        Syncope ICD-10-CM: R55  ICD-9-CM: 780.2  2/9/2015 - Present        Bradycardia ICD-10-CM: R00.1  ICD-9-CM: 427.89  2/9/2015 - Present        Acute kidney injury ([facility] ICD-10-CM: N17.9  ICD-9-CM: 584.9  2/9/2015 - Present        Anemia ICD-10-CM: D64.9  ICD-9-CM: 285.9  9/9/2014 - Present        GI bleed ICD-10-CM: K92.2  ICD-9-CM: 578.9 9/9/2014 - Present        AF (atrial fibrillation) (HCC) ICD-10-CM: I48.91  ICD-9-CM: 427.31  6/20/2014 - Present        Hypotension ICD-10-CM: I95.9  ICD-9-CM: 458.9  6/3/2014 - Present        Coronary artery disease ICD-10-CM: I25.10  ICD-9-CM: 414.00  6/3/2014 - Present    Overview Signed 2/10/2015 11:06 AM by Sarah Leyva     2007 PCI x2 to LAD with STEPHAN             S/P coronary artery stent placement ICD-10-CM: Z95.5  ICD-9-CM: V45.82  6/3/2014 - Present        Renal failure ICD-10-CM: N19  ICD-9-CM: 772  6/3/2014 - Present        Elevated troponin ICD-10-CM: R77.8  ICD-9-CM: 790.6  6/3/2014 - Present        Pericardial effusion ICD-10-CM: I31.3  ICD-9-CM: 423.9  6/3/2014 - Present        Lactic acidosis ICD-10-CM: E87.2  ICD-9-CM: 276.2  6/3/2014 - Present        RESOLVED: CHF exacerbation ([facility] ICD-10-CM: I50.9  ICD-9-CM: 428.0  9/25/2020 - 1/6/2022        RESOLVED: CHF (congestive heart failure) ([facility] ICD-10-CM: I50.9  ICD-9-CM: 428.0  9/22/2020 - 8/3/2021              Greater than 35 minutes were spent with the patient on counseling and coordination of care    Signed:   Radha Hunter MD  1/28/2022  6:42 PM

## 2022-01-31 ENCOUNTER — TELEPHONE (OUTPATIENT)
Dept: CARDIOLOGY CLINIC | Age: 82
End: 2022-01-31

## 2022-01-31 NOTE — TELEPHONE ENCOUNTER
----- Message from Leanne Marrero. SUAD Sue sent at 1/30/2022  8:46 PM EST -----  Regarding: follow up  This pt was finally discharged over weekend. She will need followup with Dr. Shweta Coy in 10 day -2 weeks or so. She went to snf. CHF history, afib/flutter frequent hospitalizations. Thanks. Please call pt or faciltty with appointment. Thanks.

## 2022-01-31 NOTE — TELEPHONE ENCOUNTER
Called patient to schedule appointment. Voicemail box full so unable to leave message. Will try back.

## 2022-02-04 ENCOUNTER — PATIENT OUTREACH (OUTPATIENT)
Dept: CASE MANAGEMENT | Age: 82
End: 2022-02-04

## 2022-02-11 ENCOUNTER — APPOINTMENT (OUTPATIENT)
Dept: GENERAL RADIOLOGY | Age: 82
DRG: 280 | End: 2022-02-11
Attending: STUDENT IN AN ORGANIZED HEALTH CARE EDUCATION/TRAINING PROGRAM
Payer: MEDICARE

## 2022-02-11 ENCOUNTER — APPOINTMENT (OUTPATIENT)
Dept: NUCLEAR MEDICINE | Age: 82
DRG: 280 | End: 2022-02-11
Attending: STUDENT IN AN ORGANIZED HEALTH CARE EDUCATION/TRAINING PROGRAM
Payer: MEDICARE

## 2022-02-11 ENCOUNTER — HOSPITAL ENCOUNTER (INPATIENT)
Age: 82
LOS: 7 days | Discharge: NURSING, REHAB OR LONG-TERM CARE | DRG: 280 | End: 2022-02-18
Attending: STUDENT IN AN ORGANIZED HEALTH CARE EDUCATION/TRAINING PROGRAM | Admitting: HOSPITALIST
Payer: MEDICARE

## 2022-02-11 DIAGNOSIS — R09.02 HYPOXIA: ICD-10-CM

## 2022-02-11 DIAGNOSIS — I48.91 ATRIAL FIBRILLATION WITH RVR (HCC): ICD-10-CM

## 2022-02-11 DIAGNOSIS — I50.23 ACUTE ON CHRONIC SYSTOLIC CONGESTIVE HEART FAILURE (HCC): ICD-10-CM

## 2022-02-11 DIAGNOSIS — J81.0 ACUTE PULMONARY EDEMA (HCC): Primary | ICD-10-CM

## 2022-02-11 DIAGNOSIS — R77.8 ELEVATED TROPONIN: ICD-10-CM

## 2022-02-11 LAB
ALBUMIN SERPL-MCNC: 3.3 G/DL (ref 3.5–5)
ALBUMIN/GLOB SERPL: 1.1 {RATIO} (ref 1.1–2.2)
ALP SERPL-CCNC: 104 U/L (ref 45–117)
ALT SERPL-CCNC: 10 U/L (ref 12–78)
ANION GAP SERPL CALC-SCNC: 6 MMOL/L (ref 5–15)
AST SERPL-CCNC: 9 U/L (ref 15–37)
BASOPHILS # BLD: 0 K/UL (ref 0–0.1)
BASOPHILS NFR BLD: 0 % (ref 0–1)
BILIRUB SERPL-MCNC: 0.7 MG/DL (ref 0.2–1)
BNP SERPL-MCNC: 8566 PG/ML
BUN SERPL-MCNC: 28 MG/DL (ref 6–20)
BUN/CREAT SERPL: 18 (ref 12–20)
CALCIUM SERPL-MCNC: 8.6 MG/DL (ref 8.5–10.1)
CALCULATED R AXIS, ECG10: -36 DEGREES
CALCULATED T AXIS, ECG11: -111 DEGREES
CALCULATED T AXIS, ECG11: 173 DEGREES
CHLORIDE SERPL-SCNC: 108 MMOL/L (ref 97–108)
CO2 SERPL-SCNC: 24 MMOL/L (ref 21–32)
COMMENT, HOLDF: NORMAL
COVID-19 RAPID TEST, COVR: NOT DETECTED
CREAT SERPL-MCNC: 1.58 MG/DL (ref 0.55–1.02)
DIAGNOSIS, 93000: NORMAL
DIAGNOSIS, 93000: NORMAL
DIFFERENTIAL METHOD BLD: ABNORMAL
EOSINOPHIL # BLD: 0.1 K/UL (ref 0–0.4)
EOSINOPHIL NFR BLD: 1 % (ref 0–7)
ERYTHROCYTE [DISTWIDTH] IN BLOOD BY AUTOMATED COUNT: 13.9 % (ref 11.5–14.5)
GLOBULIN SER CALC-MCNC: 3 G/DL (ref 2–4)
GLUCOSE SERPL-MCNC: 227 MG/DL (ref 65–100)
HCT VFR BLD AUTO: 32.5 % (ref 35–47)
HGB BLD-MCNC: 10.7 G/DL (ref 11.5–16)
IMM GRANULOCYTES # BLD AUTO: 0 K/UL (ref 0–0.04)
IMM GRANULOCYTES NFR BLD AUTO: 0 % (ref 0–0.5)
LYMPHOCYTES # BLD: 0.5 K/UL (ref 0.8–3.5)
LYMPHOCYTES NFR BLD: 4 % (ref 12–49)
MCH RBC QN AUTO: 32 PG (ref 26–34)
MCHC RBC AUTO-ENTMCNC: 32.9 G/DL (ref 30–36.5)
MCV RBC AUTO: 97.3 FL (ref 80–99)
MONOCYTES # BLD: 0.6 K/UL (ref 0–1)
MONOCYTES NFR BLD: 5 % (ref 5–13)
NEUTS SEG # BLD: 10.1 K/UL (ref 1.8–8)
NEUTS SEG NFR BLD: 90 % (ref 32–75)
NRBC # BLD: 0 K/UL (ref 0–0.01)
NRBC BLD-RTO: 0 PER 100 WBC
PLATELET # BLD AUTO: 155 K/UL (ref 150–400)
PMV BLD AUTO: 9.1 FL (ref 8.9–12.9)
POTASSIUM SERPL-SCNC: 4.2 MMOL/L (ref 3.5–5.1)
PROT SERPL-MCNC: 6.3 G/DL (ref 6.4–8.2)
Q-T INTERVAL, ECG07: 310 MS
Q-T INTERVAL, ECG07: 378 MS
QRS DURATION, ECG06: 130 MS
QRS DURATION, ECG06: 130 MS
QTC CALCULATION (BEZET), ECG08: 436 MS
QTC CALCULATION (BEZET), ECG08: 502 MS
RBC # BLD AUTO: 3.34 M/UL (ref 3.8–5.2)
RBC MORPH BLD: ABNORMAL
RBC MORPH BLD: ABNORMAL
SAMPLES BEING HELD,HOLD: NORMAL
SODIUM SERPL-SCNC: 138 MMOL/L (ref 136–145)
SOURCE, COVRS: NORMAL
TROPONIN-HIGH SENSITIVITY: 324 NG/L (ref 0–51)
TROPONIN-HIGH SENSITIVITY: 386 NG/L (ref 0–51)
TROPONIN-HIGH SENSITIVITY: 421 NG/L (ref 0–51)
TROPONIN-HIGH SENSITIVITY: 54 NG/L (ref 0–51)
VENTRICULAR RATE, ECG03: 106 BPM
VENTRICULAR RATE, ECG03: 119 BPM
WBC # BLD AUTO: 11.3 K/UL (ref 3.6–11)

## 2022-02-11 PROCEDURE — 99285 EMERGENCY DEPT VISIT HI MDM: CPT

## 2022-02-11 PROCEDURE — 74011250636 HC RX REV CODE- 250/636: Performed by: STUDENT IN AN ORGANIZED HEALTH CARE EDUCATION/TRAINING PROGRAM

## 2022-02-11 PROCEDURE — 74011000258 HC RX REV CODE- 258: Performed by: STUDENT IN AN ORGANIZED HEALTH CARE EDUCATION/TRAINING PROGRAM

## 2022-02-11 PROCEDURE — 71045 X-RAY EXAM CHEST 1 VIEW: CPT

## 2022-02-11 PROCEDURE — 74011250637 HC RX REV CODE- 250/637: Performed by: HOSPITALIST

## 2022-02-11 PROCEDURE — 74011250637 HC RX REV CODE- 250/637: Performed by: NURSE PRACTITIONER

## 2022-02-11 PROCEDURE — 84484 ASSAY OF TROPONIN QUANT: CPT

## 2022-02-11 PROCEDURE — 93005 ELECTROCARDIOGRAM TRACING: CPT

## 2022-02-11 PROCEDURE — 74011000250 HC RX REV CODE- 250: Performed by: HOSPITALIST

## 2022-02-11 PROCEDURE — A9540 TC99M MAA: HCPCS

## 2022-02-11 PROCEDURE — 87635 SARS-COV-2 COVID-19 AMP PRB: CPT

## 2022-02-11 PROCEDURE — 85025 COMPLETE CBC W/AUTO DIFF WBC: CPT

## 2022-02-11 PROCEDURE — 83880 ASSAY OF NATRIURETIC PEPTIDE: CPT

## 2022-02-11 PROCEDURE — 80053 COMPREHEN METABOLIC PANEL: CPT

## 2022-02-11 PROCEDURE — 74011000250 HC RX REV CODE- 250: Performed by: STUDENT IN AN ORGANIZED HEALTH CARE EDUCATION/TRAINING PROGRAM

## 2022-02-11 PROCEDURE — 36415 COLL VENOUS BLD VENIPUNCTURE: CPT

## 2022-02-11 PROCEDURE — 99223 1ST HOSP IP/OBS HIGH 75: CPT | Performed by: INTERNAL MEDICINE

## 2022-02-11 PROCEDURE — 65660000001 HC RM ICU INTERMED STEPDOWN

## 2022-02-11 RX ORDER — GABAPENTIN 100 MG/1
100 CAPSULE ORAL
Status: DISCONTINUED | OUTPATIENT
Start: 2022-02-11 | End: 2022-02-12

## 2022-02-11 RX ORDER — BUSPIRONE HYDROCHLORIDE 5 MG/1
5 TABLET ORAL 3 TIMES DAILY
Status: DISCONTINUED | OUTPATIENT
Start: 2022-02-11 | End: 2022-02-12

## 2022-02-11 RX ORDER — ARIPIPRAZOLE 5 MG/1
20 TABLET ORAL DAILY
Status: DISCONTINUED | OUTPATIENT
Start: 2022-02-12 | End: 2022-02-18 | Stop reason: HOSPADM

## 2022-02-11 RX ORDER — TRAZODONE HYDROCHLORIDE 50 MG/1
25 TABLET ORAL
COMMUNITY

## 2022-02-11 RX ORDER — CARVEDILOL 12.5 MG/1
12.5 TABLET ORAL 2 TIMES DAILY WITH MEALS
COMMUNITY
End: 2022-02-18

## 2022-02-11 RX ORDER — LANOLIN ALCOHOL/MO/W.PET/CERES
400 CREAM (GRAM) TOPICAL DAILY
COMMUNITY

## 2022-02-11 RX ORDER — DILTIAZEM HYDROCHLORIDE 5 MG/ML
0.25 INJECTION INTRAVENOUS
Status: COMPLETED | OUTPATIENT
Start: 2022-02-11 | End: 2022-02-11

## 2022-02-11 RX ORDER — CARBIDOPA AND LEVODOPA 25; 100 MG/1; MG/1
2 TABLET ORAL 4 TIMES DAILY
Status: DISCONTINUED | OUTPATIENT
Start: 2022-02-11 | End: 2022-02-18 | Stop reason: HOSPADM

## 2022-02-11 RX ORDER — NITROGLYCERIN 0.4 MG/1
0.4 TABLET SUBLINGUAL
Status: DISCONTINUED | OUTPATIENT
Start: 2022-02-11 | End: 2022-02-18 | Stop reason: HOSPADM

## 2022-02-11 RX ORDER — UREA 10 %
100 LOTION (ML) TOPICAL DAILY
Status: DISCONTINUED | OUTPATIENT
Start: 2022-02-12 | End: 2022-02-18 | Stop reason: HOSPADM

## 2022-02-11 RX ORDER — CLOPIDOGREL BISULFATE 75 MG/1
75 TABLET ORAL
Status: DISCONTINUED | OUTPATIENT
Start: 2022-02-12 | End: 2022-02-15

## 2022-02-11 RX ORDER — POLYETHYLENE GLYCOL 3350 17 G/17G
17 POWDER, FOR SOLUTION ORAL
COMMUNITY

## 2022-02-11 RX ORDER — ATORVASTATIN CALCIUM 40 MG/1
40 TABLET, FILM COATED ORAL
Status: DISCONTINUED | OUTPATIENT
Start: 2022-02-11 | End: 2022-02-18 | Stop reason: HOSPADM

## 2022-02-11 RX ORDER — BUMETANIDE 0.25 MG/ML
1 INJECTION INTRAMUSCULAR; INTRAVENOUS 2 TIMES DAILY
Status: DISCONTINUED | OUTPATIENT
Start: 2022-02-11 | End: 2022-02-17

## 2022-02-11 RX ORDER — DULOXETIN HYDROCHLORIDE 60 MG/1
120 CAPSULE, DELAYED RELEASE ORAL DAILY
Status: DISCONTINUED | OUTPATIENT
Start: 2022-02-12 | End: 2022-02-18 | Stop reason: HOSPADM

## 2022-02-11 RX ORDER — POTASSIUM CHLORIDE 1.5 G/1.77G
20 POWDER, FOR SOLUTION ORAL EVERY OTHER DAY
COMMUNITY

## 2022-02-11 RX ORDER — HYDRALAZINE HYDROCHLORIDE 50 MG/1
50 TABLET, FILM COATED ORAL 3 TIMES DAILY
Status: DISCONTINUED | OUTPATIENT
Start: 2022-02-12 | End: 2022-02-12

## 2022-02-11 RX ORDER — MELATONIN
1000 DAILY
Status: DISCONTINUED | OUTPATIENT
Start: 2022-02-12 | End: 2022-02-18 | Stop reason: HOSPADM

## 2022-02-11 RX ORDER — GUAIFENESIN 100 MG/5ML
81 LIQUID (ML) ORAL DAILY
Status: DISCONTINUED | OUTPATIENT
Start: 2022-02-11 | End: 2022-02-17

## 2022-02-11 RX ORDER — OMEPRAZOLE 20 MG/1
20 CAPSULE, DELAYED RELEASE ORAL DAILY
COMMUNITY
End: 2022-02-18

## 2022-02-11 RX ORDER — ENOXAPARIN SODIUM 100 MG/ML
30 INJECTION SUBCUTANEOUS EVERY 24 HOURS
Status: DISCONTINUED | OUTPATIENT
Start: 2022-02-11 | End: 2022-02-15 | Stop reason: ALTCHOICE

## 2022-02-11 RX ORDER — CARVEDILOL 12.5 MG/1
12.5 TABLET ORAL 2 TIMES DAILY WITH MEALS
Status: DISCONTINUED | OUTPATIENT
Start: 2022-02-11 | End: 2022-02-18 | Stop reason: HOSPADM

## 2022-02-11 RX ORDER — PANTOPRAZOLE SODIUM 40 MG/1
40 TABLET, DELAYED RELEASE ORAL
Status: DISCONTINUED | OUTPATIENT
Start: 2022-02-12 | End: 2022-02-18 | Stop reason: HOSPADM

## 2022-02-11 RX ORDER — CILOSTAZOL 50 MG/1
100 TABLET ORAL
Status: DISCONTINUED | OUTPATIENT
Start: 2022-02-11 | End: 2022-02-18 | Stop reason: HOSPADM

## 2022-02-11 RX ADMIN — DILTIAZEM HYDROCHLORIDE 5 MG/HR: 5 INJECTION, SOLUTION INTRAVENOUS at 11:17

## 2022-02-11 RX ADMIN — CARVEDILOL 12.5 MG: 12.5 TABLET, FILM COATED ORAL at 17:31

## 2022-02-11 RX ADMIN — ATORVASTATIN CALCIUM 40 MG: 40 TABLET, FILM COATED ORAL at 22:11

## 2022-02-11 RX ADMIN — SODIUM CHLORIDE, POTASSIUM CHLORIDE, SODIUM LACTATE AND CALCIUM CHLORIDE 1000 ML: 600; 310; 30; 20 INJECTION, SOLUTION INTRAVENOUS at 09:30

## 2022-02-11 RX ADMIN — DILTIAZEM HYDROCHLORIDE 19 MG: 5 INJECTION INTRAVENOUS at 11:03

## 2022-02-11 RX ADMIN — ASPIRIN 81 MG CHEWABLE TABLET 81 MG: 81 TABLET CHEWABLE at 17:21

## 2022-02-11 RX ADMIN — BUMETANIDE 1 MG: 0.25 INJECTION INTRAMUSCULAR; INTRAVENOUS at 17:31

## 2022-02-11 RX ADMIN — CARBIDOPA AND LEVODOPA 2 TABLET: 25; 100 TABLET ORAL at 22:11

## 2022-02-11 RX ADMIN — CARBIDOPA AND LEVODOPA 2 TABLET: 25; 100 TABLET ORAL at 17:31

## 2022-02-11 RX ADMIN — CILOSTAZOL 100 MG: 50 TABLET ORAL at 17:31

## 2022-02-11 RX ADMIN — DILTIAZEM HYDROCHLORIDE 15 MG/HR: 5 INJECTION, SOLUTION INTRAVENOUS at 21:03

## 2022-02-11 RX ADMIN — BUMETANIDE 1 MG: 0.25 INJECTION INTRAMUSCULAR; INTRAVENOUS at 12:58

## 2022-02-11 NOTE — ED PROVIDER NOTES
Patient is an 60-year-old female with a complex medical history presenting to ED with chest pain, hypoxia and shortness of breath. Apparently patient was 70% when EMS arrived at her facility. Placed on nonrebreather with improvement in oxygen saturation. EMS had diffuse ST elevations on her EKG and called a code STEMI from the field. On arrival no signs of ST elevation on EKG. Patient does have A. fib with RVR. Patient was recently admitted for CHF exacerbation. Patient has pitting edema to the mid shins at this time. The history is provided by the patient, medical records and the EMS personnel. Chest Pain (Angina)   This is a new problem. The problem has been gradually improving. The problem occurs constantly. The pain is associated with normal activity. The pain is present in the substernal region. The pain is at a severity of 5/10. The pain is moderate. The quality of the pain is described as pressure-like. The pain does not radiate. Associated symptoms include malaise/fatigue and shortness of breath. Treatments tried: Supplemental oxygen. The treatment provided moderate relief. Risk factors include obesity, hypertension and cardiac disease. Past medical history comments: Recent Covid infection. Procedural history includes cardiac catheterization and CABG.        Past Medical History:   Diagnosis Date    Anxiety disorder     Atrial fibrillation (HCC)     CAD (coronary artery disease) 2007    stents, CABG x 3v    Carotid stenosis     Cervical stenosis of spinal canal 07/2019    Chronic kidney disease     Cough     CVA (cerebral vascular accident) ([facility] 07/2019    left lacunar infarct at head of caudate    Depression     AND CHRONIC ANXIETY    Diabetes ([facility]     GERD (gastroesophageal reflux disease)     High cholesterol     History of peptic ulcer     Bleeding ulcer with increased NSAID use    Hypertension     Left bundle branch block 01/01/2022    Left carotid stenosis 07/2019    s/p left CEA with Dr. Mason Chow, old     PUD (peptic ulcer disease)     Stroke ([facility]     Tremor     Valvular heart disease        Past Surgical History:   Procedure Laterality Date    COLONOSCOPY N/A 2021    COLONOSCOPY   :- [facility] ENDOSCOPY    HX CAROTID ENDARTERECTOMY  2019    HX CORONARY ARTERY BYPASS GRAFT      HX TONSILLECTOMY  1963    IN CARDIAC SURG PROCEDURE UNLIST      CABG X3 VESSEL    IN TOTAL KNEE ARTHROPLASTY Right 2015         Family History:   Problem Relation Age of Onset    Heart Attack Mother 72        Dec 81yo    Hypertension Mother     Other Father         Unknown    Parkinson's Disease Brother     Anesth Problems Neg Hx        Social History     Socioeconomic History    Marital status: SINGLE     Spouse name: Not on file    Number of children: Not on file    Years of education: Not on file    Highest education level: Not on file   Occupational History    Occupation: Retired realestate/teacher   Tobacco Use    Smoking status: Former Smoker     Packs/day: 0.25     Years: 5.00     Pack years: 1.25     Types: Cigarettes     Quit date:      Years since quittin.1    Smokeless tobacco: Never Used   Substance and Sexual Activity    Alcohol use: Not Currently     Comment: Rare    Drug use: No    Sexual activity: Not on file   Other Topics Concern    Not on file   Social History Narrative    Lives in [facility]     Social Determinants of Health     Financial Resource Strain:     Difficulty of Paying Living Expenses: Not on file   Food Insecurity:     Worried About [address] in the Last Year: Not on file    [address] in the Last Year: Not on file   Transportation Needs:     Lack of Transportation (Medical): Not on file    Lack of Transportation (Non-Medical):  Not on file   Physical Activity:     Days of Exercise per Week: Not on file    Minutes of Exercise per Session: Not on file   Stress:  Feeling of Stress : Not on file   Social Connections:     Frequency of Communication with Friends and Family: Not on file    Frequency of Social Gatherings with Friends and Family: Not on file    Attends Worship Services: Not on file    Active Member of Clubs or Organizations: Not on file    Attends Club or Organization Meetings: Not on file    Marital Status: Not on file   Intimate Partner Violence:     Fear of Current or Ex-Partner: Not on file    Emotionally Abused: Not on file    Physically Abused: Not on file    Sexually Abused: Not on file   Housing Stability:     Unable to Pay for Housing in the Last Year: Not on file    Number of [place] in the Last Year: Not on file    Unstable Housing in the Last Year: Not on file     ALLERGIES: Patient has no known allergies. Review of Systems   Constitutional: Positive for malaise/fatigue. HENT: Negative. Eyes: Negative. Respiratory: Positive for shortness of breath. Cardiovascular: Positive for chest pain and leg swelling. Gastrointestinal: Negative. Endocrine: Negative. Genitourinary: Negative. Musculoskeletal: Negative. Skin: Negative. Allergic/Immunologic: Negative. Neurological: Negative. Hematological: Negative. Psychiatric/Behavioral: Negative. Vitals:    02/11/22 0930 02/11/22 0945 02/11/22 0945 02/11/22 1023   BP: (!) 161/103 (!) 129/92     Pulse: (!) 112 (!) 112  (!) 114   Resp: 25 19  18   Temp:    98.5 °F (36.9 °C)   SpO2: 99% 100% 98% 97%   Weight:       Height:                Physical Exam  Vitals and nursing note reviewed. Constitutional:       General: She is not in acute distress. Appearance: Normal appearance. HENT:      Head: Normocephalic and atraumatic. Right Ear: External ear normal.      Left Ear: External ear normal.      Nose: Nose normal.   Eyes:      Extraocular Movements: Extraocular movements intact.       Conjunctiva/sclera: Conjunctivae normal. Cardiovascular:      Rate and Rhythm: Tachycardia present. Rhythm irregular. Pulses: Normal pulses. Radial pulses are 2+ on the right side and 2+ on the left side. Heart sounds: Normal heart sounds. Pulmonary:      Effort: Pulmonary effort is normal. Tachypnea present. Breath sounds: Normal breath sounds. Chest:      Chest wall: No deformity or tenderness. Abdominal:      General: Abdomen is flat. There is no distension. Tenderness: There is no abdominal tenderness. Musculoskeletal:         General: No deformity or signs of injury. Normal range of motion. Cervical back: Normal range of motion and neck supple. No tenderness. Right lower leg: Tenderness present. Edema present. Left lower leg: Tenderness present. Edema present. Skin:     General: Skin is warm and dry. Capillary Refill: Capillary refill takes less than 2 seconds. Neurological:      General: No focal deficit present. Mental Status: She is alert and oriented to person, place, and time. Psychiatric:         Attention and Perception: Attention normal.         Mood and Affect: Mood normal.         Behavior: Behavior normal.          [facility]  ED Course as of 02/11/22 1045   Fri Feb 11, 2022   0920 Patient was called code STEMI by EMS. They had diffuse ST elevations on their EKG. Patient was hypoxic to 70% on room air at that time. Repeat EKG in the ED shows A. fib with RVR but no definitive ST elevations. Code STEMI was canceled. [AL]   0935 EKG interpretation:   Rhythm: atrial fib and RVR; and regular . Rate (approx.): 119; ST/T wave: non-specific changes; EKG documented and interpreted by Radha Mansfield.  Brennan Vargas MD, Emergency Medicine.   [AL]      ED Course User Index  [AL] Shantal Briggs MD     LABORATORY RESULTS:  Labs Reviewed   CBC WITH AUTOMATED DIFF - Abnormal; Notable for the following components:       Result Value    WBC 11.3 (*)     RBC 3.34 (*)     HGB 10.7 (*)     HCT 32.5 (*) NEUTROPHILS 90 (*)     LYMPHOCYTES 4 (*)     ABS. NEUTROPHILS 10.1 (*)     ABS. LYMPHOCYTES 0.5 (*)     All other components within normal limits   METABOLIC PANEL, COMPREHENSIVE - Abnormal; Notable for the following components:    Glucose 227 (*)     BUN 28 (*)     Creatinine 1.58 (*)     GFR est AA 38 (*)     GFR est non-AA 31 (*)     ALT (SGPT) 10 (*)     AST (SGOT) 9 (*)     Protein, total 6.3 (*)     Albumin 3.3 (*)     All other components within normal limits   TROPONIN-HIGH SENSITIVITY - Abnormal; Notable for the following components:    Troponin-High Sensitivity 54 (*)     All other components within normal limits   COVID-19 RAPID TEST   SAMPLES BEING HELD   NT-PRO BNP       IMAGING RESULTS:  XR CHEST PORT   Final Result   Pulmonary edema and trace bilateral pleural effusions. NM LUNG VENT/PERF    (Results Pending)       MEDICATIONS GIVEN:  Medications   dilTIAZem (CARDIZEM) injection 19 mg (has no administration in time range)   dilTIAZem (CARDIZEM) 125 mg in dextrose 5% 125 mL infusion (has no administration in time range)   lactated ringers bolus infusion 1,000 mL (1,000 mL IntraVENous New Bag 2/11/22 0930)       Differential diagnosis: A. fib with RVR, hypoxia, CHF exacerbation, ACS    ED physician interpretation of imaging: Chest x-ray with pulmonary edema  ED physician interpretation of EKG: No STEMI. See my interpretation EKG and ED course above. ED physician interpretation of laboratory results: BNP elevated concerning for CHF exacerbation consistent with patient's chest x-ray. Troponin elevated at 54 slightly above cutoff for adult female however will likely be elevated in a patient with CHF. Repeat test on hospitalization indicated    MDM: Patient is a 36-year-old female presenting to the ED with chest pain, hypoxia, shortness of breath, A. fib with RVR requiring diltiazem bolus and drip as well as VQ scan and further evaluation for chest pain.   Patient's initial EKG not consistent with ischemia. However difficult to fully assess in the setting of A. fib with RVR. First troponin above cutoff but not severely elevated for patient who has known CHF. Patient admitted to the hospitalist service for further treatment evaluation. DISPOSITION: Admitted    [address] for Admission  10:41 AM    ED Room Number: ER08/08  Patient Name and age:  Edwin Cunningham [de-identified] y.o.  female  Working Diagnosis:   1. Acute pulmonary edema (HCC)    2. Hypoxia    3. Atrial fibrillation with RVR ([facility]        COVID-19 Suspicion:  no  Sepsis present:  no  Reassessment needed: yes  Code Status:  Do Not Resuscitate  Readmission: yes  Isolation Requirements:  no  Recommended Level of Care:  telemetry  Department:  [facility] Adult ED - 21     Other: Patient presented to ED with hypoxia, shortness of breath, A. fib with RVR, CHF exacerbation. Could also have risk for PE based on recent positive Covid. Patient not a candidate for CTA due to poor renal function. Will likely need VQ scan if concern for PE continues. Patient on 6 L nasal cannula at this time maintaining oxygen saturations. Isaiah Hernandez.  [facility], MD        Procedures

## 2022-02-11 NOTE — ED NOTES
TRANSFER - OUT REPORT:    Verbal report given to Kamini Ray RN(name) on Omayra Mckinnon  being transferred to [facility](unit) for routine progression of care       Report consisted of patients Situation, Background, Assessment and   Recommendations(SBAR). Information from the following report(s) SBAR, ED Summary, MAR, Recent Results, Med Rec Status, Cardiac Rhythm afib and Alarm Parameters  was reviewed with the receiving nurse. Lines:   Peripheral IV 02/11/22 Right Antecubital (Active)   Site Assessment Clean, dry, & intact 02/11/22 0922   Phlebitis Assessment 0 02/11/22 0922   Infiltration Assessment 0 02/11/22 0922   Dressing Status Clean, dry, & intact 02/11/22 0922   Hub Color/Line Status Pink 02/11/22 0922   Action Taken Blood drawn 02/11/22 4568        Opportunity for questions and clarification was provided.       Patient transported with:   Monitor  Registered Nurse

## 2022-02-11 NOTE — ED TRIAGE NOTES
Pt from [address] [place] [address] for chest pain, ems found pt with sats of 70's on ra, up to 98% on 10lpm nrb mask, pt in afib w rvr. Pt has DNR.

## 2022-02-11 NOTE — PROGRESS NOTES
Admission Medication Reconciliation:    Information obtained from:  medication list from [address] [place] [address]:  NO    Comments/Recommendations: Updated PTA meds/reviewed patient's allergies. 1)  Medication changes (since last review): Added  - magnesium, polyethylene glycol, omeprazole,  potassium, trazodone    Adjusted  - coreg 25 to 12.5 mg    Removed  - buspirone, gabapentin, hydralazine, melatonin, pantoprazole       ¹Rx[employer] pharmacy benefit data reflects medications filled and processed through the patient's insurance, however   this data does NOT capture whether the medication was picked up or is currently being taken by the patient. Allergies:  Patient has no known allergies. Significant PMH/Disease States:   Past Medical History:   Diagnosis Date    Anxiety disorder     Atrial fibrillation (HCC)     CAD (coronary artery disease) 2007    stents, CABG x 3v    Carotid stenosis     Cervical stenosis of spinal canal 07/2019    Chronic kidney disease     Cough     CVA (cerebral vascular accident) ([facility] [address] 07/2019    left lacunar infarct at head of caudate    Depression     AND CHRONIC ANXIETY    Diabetes ([facility] [address]     GERD (gastroesophageal reflux disease)     High cholesterol     History of peptic ulcer     Bleeding ulcer with increased NSAID use    Hypertension     Left bundle branch block 01/01/2022    Left carotid stenosis 07/2019    s/p left CEA with Dr. Quezada Spotted    MI, old 2007    PUD (peptic ulcer disease)     Stroke [facility])     Tremor     Valvular heart disease      Chief Complaint for this Admission:    Chief Complaint   Patient presents with    Chest Pain     Prior to Admission Medications:   Prior to Admission Medications   Prescriptions Last Dose Informant Taking? ARIPiprazole (ABILIFY) 20 mg tablet   No   Sig: Take 1 Tablet by mouth daily. Cholecalciferol, Vitamin D3, 1,000 unit cap   No   Sig: Take 1,000 Units by mouth daily.    DULoxetine (CYMBALTA) 60 mg capsule   No   Sig: Take 2 Capsules by mouth daily. Indications: anxiousness associated with depression   acetaminophen (TYLENOL) 325 mg tablet   No   Sig: Take 650 mg by mouth every six (6) hours as needed for Pain or Fever. atorvastatin (LIPITOR) 40 mg tablet   No   Sig: Take 1 Tablet by mouth nightly. bumetanide (BUMEX) 1 mg tablet   No   Sig: Take 1 Tablet by mouth two (2) times a day for 30 days. carbidopa-levodopa (SINEMET)  mg per tablet   No   Sig: Take 2 Tabs by mouth four (4) times daily. carvediloL (Coreg) 12.5 mg tablet   Yes   Sig: Take 12.5 mg by mouth two (2) times daily (with meals). cilostazoL (PLETAL) 100 mg tablet   No   Sig: Take 100 mg by mouth Before breakfast and dinner. clopidogreL (Plavix) 75 mg tab   No   Sig: Take 75 mg by mouth daily (after breakfast). cyanocobalamin (VITAMIN B12) 100 mcg tablet   No   Sig: Take 100 mcg by mouth daily. dilTIAZem IR (CARDIZEM) 30 mg tablet   No   Sig: Take 1 Tablet by mouth Before breakfast, lunch, and dinner for 30 days. magnesium oxide (MAG-OX) 400 mg tablet   Yes   Sig: Take 400 mg by mouth daily. multivitamins-minerals-lutein (CENTRUM SILVER) tab tablet   No   Sig: Take 1 Tablet by mouth daily. nitroglycerin (Nitrostat) 0.4 mg SL tablet   No   Si.4 mg by SubLINGual route every five (5) minutes as needed for Chest Pain. Up to 3 doses. omeprazole (PRILOSEC) 20 mg capsule   Yes   Sig: Take 20 mg by mouth daily. ondansetron (ZOFRAN ODT) 4 mg disintegrating tablet   No   Sig: Take 1 Tablet by mouth every eight (8) hours as needed for Nausea or Vomiting. polyethylene glycol (Miralax) 17 gram packet   Yes   Sig: Take 17 g by mouth daily as needed for Constipation. potassium chloride (KLOR-CON) 20 mEq pack   Yes   Sig: Take 20 mEq by mouth every other day. senna-docusate (Senna with Docusate Sodium) 8.6-50 mg per tablet   No   Sig: Take 1 Tablet by mouth nightly. traZODone (DESYREL) 50 mg tablet   Yes   Sig: Take 25 mg by mouth nightly. Facility-Administered Medications: None     Please contact the main inpatient pharmacy with any questions or concerns at [phone] and we will direct you to the clinical pharmacist covering this patient's care while in-house.    Catie Sanchez [facility]

## 2022-02-11 NOTE — H&P
[facility] Adult  Hospitalist Group  History and Physical    Date of Service:  2/11/2022  Primary Care Provider: Brittani Weaver DO  Source of information: The patient    Chief Complaint: Chest Pain      History of Presenting Illness:   Damari Archer is a [de-identified] y.o. female who presents with chest pain     Patient is an 73-year-old female with a complex medical history including afib, chf, cad recent covid infection 1/20, CVA, PUD and DM, pt from [facility],  presenting to ED with chest pain, hypoxia and shortness of breath. Apparently patient was 70% when EMS arrived at her facility. Placed on nonrebreather with improvement in oxygen saturation. EMS had diffuse ST elevations on her EKG and called a code STEMI from the field. On arrival no signs of ST elevation on EKG. Patient does have A. fib with RVR. she is started with cardizem drip. Her SaO2 improved on 6 L nc now.  her chest pain resolved.   Denied fever      REVIEW OF SYSTEMS:  General: HPI, no changes of appetite, eating normal  HEENT: no headache, no vision changes, no nose discharge, no hearing changes   RES: HPI,   CVS: HPI   Muscular: no joint swelling, no muscle pain, no leg swelling  Skin: no rash, no itching   GI: no vomiting, no diarrhea  : no dysuria, no hematuria  Hemo: no gum bleeding, no petechial   Neuro: no sensation changes, no focal weakness   Endo: no polydipsia   Psych: denied depression     Past Medical History:   Diagnosis Date    Anxiety disorder     [facility] fibrillation ([facility] [address]     CAD (coronary artery disease) 2007    stents, CABG x 3v    Carotid stenosis     Cervical stenosis of spinal canal 07/2019    Chronic kidney disease     Cough     CVA (cerebral vascular accident) ([address] 07/2019    left lacunar infarct at head of caudate    Depression     AND CHRONIC ANXIETY    Diabetes (HCC)     GERD (gastroesophageal reflux disease)     High cholesterol     History of peptic ulcer     Bleeding ulcer with increased NSAID use    Hypertension     Left bundle branch block 01/01/2022    Left carotid stenosis 07/2019    s/p left CEA with Dr. Chad Bean, old 2007    PUD (peptic ulcer disease)     Stroke ([facility] [address]     Tremor     Valvular heart disease       Past Surgical History:   Procedure Laterality Date    COLONOSCOPY N/A 12/17/2021    COLONOSCOPY   :- performed by Pamela Pradhan MD at [address] HX CAROTID ENDARTERECTOMY  07/2019    HX CORONARY ARTERY BYPASS GRAFT      HX TONSILLECTOMY  1963    ME CARDIAC SURG PROCEDURE UNLIST      CABG X3 VESSEL    ME TOTAL KNEE ARTHROPLASTY Right 2015     Prior to Admission medications    Medication Sig Start Date End Date Taking? Authorizing Provider   bumetanide (BUMEX) 1 mg tablet Take 1 Tablet by mouth two (2) times a day for 30 days. 1/27/22 2/26/22  Luis Zhao MD   carvediloL (COREG) 25 mg tablet Take 1 Tablet by mouth two (2) times daily (with meals) for 30 days. 1/14/22 2/13/22  Annie Kendrick MD   dilTIAZem IR (CARDIZEM) 30 mg tablet Take 1 Tablet by mouth Before breakfast, lunch, and dinner for 30 days. 1/14/22 2/13/22  Annie Kendrick MD   ARIPiprazole (ABILIFY) 20 mg tablet Take 1 Tablet by mouth daily. 1/7/22   Wesley Gautam MD   busPIRone (BUSPAR) 5 mg tablet Take 1 Tablet by mouth three (3) times daily. 1/7/22   Wesley Gautam MD   DULoxetine (CYMBALTA) 60 mg capsule Take 2 Capsules by mouth daily. Indications: anxiousness associated with depression 1/7/22   Wesley Gautam MD   senna-docusate (Senna with Docusate Sodium) 8.6-50 mg per tablet Take 1 Tablet by mouth nightly. 1/6/22   Wesley Gautam MD   ondansetron (ZOFRAN ODT) 4 mg disintegrating tablet Take 1 Tablet by mouth every eight (8) hours as needed for Nausea or Vomiting. 12/21/21   Ry Shea MD   cilostazoL (PLETAL) 100 mg tablet Take 100 mg by mouth Before breakfast and dinner.     Provider, Historical   hydrALAZINE (APRESOLINE) 50 mg tablet Take 1 Tablet by mouth three (3) times daily. 7/16/21   Fany Langford MD   atorvastatin (LIPITOR) 40 mg tablet Take 1 Tablet by mouth nightly. 7/16/21   Fany Langford MD   gabapentin (NEURONTIN) 100 mg capsule Take 100 mg by mouth nightly. Provider, Historical   melatonin 3 mg tablet Take 6 mg by mouth nightly. Provider, Historical   nitroglycerin (Nitrostat) 0.4 mg SL tablet 0.4 mg by SubLINGual route every five (5) minutes as needed for Chest Pain. Up to 3 doses. Provider, Historical   clopidogreL (Plavix) 75 mg tab Take 75 mg by mouth daily (after breakfast). Provider, Historical   carbidopa-levodopa (SINEMET)  mg per tablet Take 2 Tabs by mouth four (4) times daily. 2/4/20   Carlos Manuel Enamorado MD   acetaminophen (TYLENOL) 325 mg tablet Take 650 mg by mouth every six (6) hours as needed for Pain or Fever. Provider, Historical   cyanocobalamin (VITAMIN B12) 100 mcg tablet Take 100 mcg by mouth daily. Provider, Historical   Cholecalciferol, Vitamin D3, 1,000 unit cap Take 1,000 Units by mouth daily. Provider, Historical   pantoprazole (PROTONIX) 40 mg tablet Take 40 mg by mouth Daily (before breakfast). Indications: h/o bleeding ulcer with nsaid    Provider, Historical   multivitamins-minerals-lutein (CENTRUM SILVER) tab tablet Take 1 Tablet by mouth daily. Provider, Historical     No Known Allergies   Family History   Problem Relation Age of Onset    Heart Attack Mother 72        Dec 81yo    Hypertension Mother     Other Father         Unknown    Parkinson's Disease Brother     Anesth Problems Neg Hx       Social History:  reports that she quit smoking about 53 years ago. Her smoking use included cigarettes. She has a 1.25 pack-year smoking history. She has never used smokeless tobacco. She reports previous alcohol use. She reports that she does not use drugs. Family and social history were personally reviewed, all pertinent and relevant details are outlined as above.     Objective:     Visit Vitals  BP (!) 129/92   Pulse (!) 114   Temp 98.5 °F (36.9 °C)   Resp 18   Ht 5' 5\" (1.651 m)   Wt 75.7 kg (166 lb 14.2 oz)   SpO2 97%   BMI 27.77 kg/m²    O2 Flow Rate (L/min): 6 l/min O2 Device: Nasal cannula    PHYSICAL EXAM:   General: Alert x oriented x 3, awake,on NC   HEENT: PEERL, EOMI, dry  mucus membranes  Neck: Supple, no JVD, no meningeal signs  Chest: Clear to auscultation bilaterally   CVS: RRR, S1 S2 heard, no murmurs/rubs/gallops  Abd: Soft, non-tender, non-distended, +bowel sounds   Ext: No clubbing, no cyanosis,++  edema  Neuro/Psych: Pleasant mood and affect, no focal deficits   Cap refill: Brisk, less than 3 seconds  Pulses: 2+, symmetric in all extremities  Skin: small wound noticed in legs     Data Review: All diagnostic labs and studies have been reviewed. Abnormal Labs Reviewed   CBC WITH AUTOMATED DIFF - Abnormal; Notable for the following components:       Result Value    WBC 11.3 (*)     RBC 3.34 (*)     HGB 10.7 (*)     HCT 32.5 (*)     NEUTROPHILS 90 (*)     LYMPHOCYTES 4 (*)     ABS. NEUTROPHILS 10.1 (*)     ABS.  LYMPHOCYTES 0.5 (*)     All other components within normal limits   METABOLIC PANEL, COMPREHENSIVE - Abnormal; Notable for the following components:    Glucose 227 (*)     BUN 28 (*)     Creatinine 1.58 (*)     GFR est AA 38 (*)     GFR est non-AA 31 (*)     ALT (SGPT) 10 (*)     AST (SGOT) 9 (*)     Protein, total 6.3 (*)     Albumin 3.3 (*)     All other components within normal limits   TROPONIN-HIGH SENSITIVITY - Abnormal; Notable for the following components:    Troponin-High Sensitivity 54 (*)     All other components within normal limits       All Micro Results     Procedure Component Value Units Date/Time    COVID-19 RAPID TEST [[id number]] Collected: 02/11/22 0930    Order Status: Completed Specimen: Nasopharyngeal Updated: 02/11/22 0953     Specimen source Nasopharyngeal        COVID-19 rapid test Not detected        Comment: Rapid Abbott ID Now       Rapid NAAT: The specimen is NEGATIVE for SARS-CoV-2, the novel coronavirus associated with COVID-19. Negative results should be treated as presumptive and, if inconsistent with clinical signs and symptoms or necessary for patient management, should be tested with an alternative molecular assay. Negative results do not preclude SARS-CoV-2 infection and should not be used as the sole basis for patient management decisions. This test has been authorized by the FDA under an Emergency Use Authorization (EUA) for use by authorized laboratories. Fact sheet for Healthcare Providers: ConventionUpdate.co.nz  Fact sheet for Patients: ConventionUpdate.co.nz       Methodology: Isothermal Nucleic Acid Amplification               IMAGING:   XR CHEST PORT   Final Result   Pulmonary edema and trace bilateral pleural effusions. NM LUNG VENT/PERF    (Results Pending)        ECG/ECHO:    Results for orders placed or performed during the hospital encounter of 01/15/22   EKG, 12 LEAD, INITIAL   Result Value Ref Range    Ventricular Rate 95 BPM    Atrial Rate 71 BPM    QRS Duration 138 ms    Q-T Interval 376 ms    QTC Calculation (Bezet) 472 ms    Calculated R Axis -23 degrees    Calculated T Axis 146 degrees    Diagnosis       Atrial fibrillation  Left bundle branch block  When compared with ECG of 10-RUCHI-2022 21:31,  QRS axis shifted right  Confirmed by Claritza Haji ([id number]) on 1/26/2022 4:23:24 PM        XR CHEST PORT    Result Date: 2/11/2022  Pulmonary edema and trace bilateral pleural effusions. Assessment:   Given the patient's current clinical presentation, there is a high level of concern for decompensation if discharged from the emergency department. Complex decision making was performed, which includes reviewing the patient's available past medical records, laboratory results, and imaging studies.     Active Problems:    CHF exacerbation ([address] (1/15/2022)      Rapid atrial fibrillation ([address] (2/11/2022)      Plan:     1. Rapid afib: cardizem drip. Cardiologist consult. Not on AC due to her anemia, concerning of GIB. Pt is on plavix   2. Chest pain: may due to rapid afib, trending CE. Cardiologist consult   3. Acute on chronic resp failure: due to CHF exacerbation. Change po bumex to IV bumex bid   4. H/o covid infection last month, repeat covid neg. No isolation   5. CKD 4: monitor cr when on current diuretics   6. Parkinson's dz: continue home meds          DIET: No diet orders on file   ISOLATION PRECAUTIONS: There are currently no Active Isolations  CODE STATUS: Prior   DVT PROPHYLAXIS: Lovenox  FUNCTIONAL STATUS PRIOR TO HOSPITALIZATION: Capable of only limited self-care; confined to bed or chair likely more than 50% of waking hours. EARLY MOBILITY ASSESSMENT: Recommend an assessment from physical therapy and/or occupational therapy  ANTICIPATED DISCHARGE: Greater than 48 hours. EMERGENCY CONTACT/SURROGATE DECISION MAKER: pt makes her own decision, niece is Rubia Staley her  but pt does not want me to call her today. CRITICAL CARE WAS PERFORMED FOR THIS ENCOUNTER: NO.      Signed By: Taco Boswell MD     February 11, 2022         Please note that this dictation may have been completed with [employer], the computer voice recognition software. Quite often unanticipated grammatical, syntax, homophones, and other interpretive errors are inadvertently transcribed by the computer software. Please disregard these errors. Please excuse any errors that have escaped final proofreading.

## 2022-02-11 NOTE — CONSULTS
[facility]  Cardiology Care Note                                    Initial visit  Patient Name: Edwin Damon - [facility]:[id number]  Primary Cardiologist: Odalis Carpenter MD  Consulting Cardiologist: Chris Moser MD  Reason for Consult: CHF, rapid afib     HPI:  Ms. Markos Lyon has PMH of HTN, atrial fibrillation, fluctuating LV function, CAD status post CABG and Status post PCI of native LAD in April 2021.  She does have severe three-vessel disease with a patent LIMA to the LAD and a patent graft sequential to the first diagonal obtuse marginal branch. Additional history includes LBBB,, diastolic dysfunction and fluctuating LV function, CVA, PAD with L CEA, AAA, renal artery stenosis, anemia, and Parkinsons, gastric ulcers with GIB in 2014 (normal mucosa by EGD 12/17/21), covid 19 last month. . Last hospitalized last month for CHF exacerbation and afib with RVR. She now presents from [facility] with chief c/o SOB and reportedly sharp chest pain. (She currently denies that she had chest pain but told ER nurse on presentation that she had episode of sharp chest pain). She reports she has had SOB for past couple of days, has also had palpitations. Afib with RVR on presentation. She is markedly hypertensive and did not receive her BP meds this am. She Denies dizziness/lightheadedness, blood in stool or black stools. Refuses to answer additional questions. Last nuclear stress test was 1/11/2021: no ischemia or infarction  Last echo 1/9/22: EF 40-45%,     Assessment and Plan     1. Elevated troponin:  Trending up . (). Continue to trend. EKG with afib with LBBB (chronic). Currently denies chest pain. Suspect Troponin elevation due to demand of  hypertensive urgency, CHF exacerbation, and afib with RVR. Will recheck HS troponin- if continues to trend up significantly, will start ACS heparin. Continue home plavix, add ASA. 2. Afib with RVR: HR low 100's currently. Continue coreg (give PM dose now), Continue diltiazem gtt. Eliquis was stopped early last month due to anemia of unclear source. Will start heparin drip as above. 3. Acute on chronic HFrEF: NYHA III-IV on admission. EF 40-45%, elevated pro BNP/CXR with pulmonary edema. . Continue bumex 1 mg IV BID. Continue coreg, hydralazine. No ACE-I/ARB given renal dysfunction    4. CAD: s/p CABG, PCI/STEPHAN native LAD , patent QUINTANA to LAD, VG to D1 to OM. Continue Plavix, statin BB. ASA    5. Hypertension urgency:  BP elevated. Restart coreg 12.5 mg BID (give dose now) and continue  continue diltiazem gtt. 6. Anemia, chronic: unclear etiology. hgb improved to 10.7. No evidence of GIB at this time. (denLast EGD 12/17/21: normal mucosa, colonoscopy with polyp 12/2021) Prior SPEP/UPEP, WNL. defer evaluation to primary team.     7. CKD: creatinine improved from last month 1.58.   8. LBBB,chronic    Pt was seen by Dr. Lyndsay Villatoro.        ____________________________________________________________      Patient Active Problem List   Diagnosis Code    Hypotension I95.9    Coronary artery disease I25.10    S/P coronary artery stent placement Z95.5    Renal failure N19    Elevated troponin R77.8    Pericardial effusion I31.3    Lactic acidosis E87.2    AF (atrial fibrillation) ([facility]) I48.91    Anemia D64.9    GI bleed K92.2    Syncope R55    Bradycardia R00.1    Acute kidney injury ([address] N17.9    Postoperative anemia due to acute blood loss D62    S/P CABG (coronary artery bypass graft) Z95.1    Edema R60.9    Diabetes mellitus (HCC) E11.9    CKD (chronic kidney disease), stage III ([facility]) N18.30    Fall W19. Leslie Newton    Acute ischemic stroke ([address] I63.9    HTN (hypertension) I10    Carotid artery stenosis, symptomatic, left I65.22    Generalized weakness R53.1    Weakness R53.1    UTI (urinary tract infection) N39.0    SOB (shortness of breath) R06.02    Cellulitis and abscess of lower extremity L03.119, L02.419    CAP (community acquired pneumonia) J18.9    Acute on chronic respiratory failure with hypoxia ([facility]) J96.21    CHF (congestive heart failure) ([facility]) I50.9    CHF exacerbation ([facility]) I50.9    Pulmonary edema J81.1    Atrial fibrillation with RVR ([facility]) I48.91    Acute respiratory failure with hypoxia ([facility]) J96.01    Acute on chronic systolic and diastolic heart failure, NYHA class 4 ([facility]) I50.43    Left bundle branch block I44.7    Rapid atrial fibrillation ([facility] I48.91     -2007 PCI x2 to LAD with STEPHAN  -TTE 6/6/14 LVEF 55 % to 60 %. No RWMA. Small to mod pericardial effusion identified  circumferential to the heart, no evidence of hemodynamiccompromise. No significant change. From 6/3/14  -NM stress 6/6/14 Mildly reversal focal inferior wall defect concerning for myocardium at ischemic risk. Normal wall motion with an EF of 65 %. -TTE 6/3/14 LVEF 55 % to 60 %. No RWMA. LAE. Mod MAC. Mild TR. PASP 45mmHg. A small to moderate pericardial effusion was identified circumferential to the heart  -NM 2/15 At stress, there is diminished activity in the apex and inferior wall which is mildly improved at rest suspicious for ischemia. LVEF 70%. -cath 2/11/15 100% distal RCA, PDA fills from left. LAD ostial 70%, mid 99% within prior stents, distal 90%. Ostial LCX 90%, OM 1 80%. LV normal.  Surgical disease, she does not want to consider CABG.         Review of Systems:    [] Patient unable to provide secondary to condition    CONSTITUTIONAL: negative     ENT/MOUTH: negative     EYES: negative    CARDIOVASCULAR: currently denies chest pain, chest pain on admission, SOB     RESPIRATORY: SOB      GASTROINTESTINAL: negative     GENITOURINARY: negative     MUSCULOSKELETAL: negative      SKIN: negative    NEUROLOGICAL: negative     PSYCHOLOGICAL: negative          ENDOCRINE: negative        Past Medical History:   Diagnosis Date    Anxiety disorder     Atrial fibrillation (HCC)     CAD (coronary artery disease) 2007    stents, CABG x 3v    Carotid stenosis     Cervical stenosis of spinal canal 07/2019    Chronic kidney disease     Cough     CVA (cerebral vascular accident) ([facility] 07/2019    left lacunar infarct at head of caudate    Depression     AND CHRONIC ANXIETY    Diabetes (HCC)     GERD (gastroesophageal reflux disease)     High cholesterol     History of peptic ulcer     Bleeding ulcer with increased NSAID use    Hypertension     Left bundle branch block 01/01/2022    Left carotid stenosis 07/2019    s/p left CEA with Dr. Willis Knott MI, old 2007    PUD (peptic ulcer disease)     Stroke ([facility]     Tremor     Valvular heart disease      Past Surgical History:   Procedure Laterality Date    COLONOSCOPY N/A 12/17/2021    COLONOSCOPY   :- performed by Edgard Montilla [facility] at [facility] ENDOSCOPY    HX CAROTID ENDARTERECTOMY  07/2019    HX CORONARY ARTERY BYPASS GRAFT      HX TONSILLECTOMY  1963    [place] CARDIAC SURG PROCEDURE UNLIST      CABG X3 VESSEL    [place] TOTAL KNEE ARTHROPLASTY Right 2015     Current Facility-Administered Medications   Medication Dose Route Frequency    dilTIAZem (CARDIZEM) 125 mg in dextrose 5% 125 mL infusion  0-15 mg/hr IntraVENous TITRATE    [START ON 2/12/2022] ARIPiprazole (ABILIFY) tablet 20 mg  20 mg Oral DAILY    atorvastatin (LIPITOR) tablet 40 mg  40 mg Oral QHS    busPIRone (BUSPAR) tablet 5 mg  5 mg Oral TID    carbidopa-levodopa (SINEMET)  mg per tablet 2 Tablet  2 Tablet Oral QID    carvediloL (COREG) tablet 12.5 mg  12.5 mg Oral BID WITH MEALS    cilostazoL (PLETAL) tablet 100 mg  100 mg Oral ACB&D    [START ON 2/12/2022] cholecalciferol (VITAMIN D3) (1000 Units /25 mcg) tablet 1,000 Units  1,000 Units Oral DAILY    [START ON 2/12/2022] clopidogreL (PLAVIX) tablet 75 mg  75 mg Oral DAILY AFTER BREAKFAST    [START ON 2/12/2022] cyanocobalamin (VITAMIN B12) tablet 100 mcg  100 mcg Oral DAILY    [START ON 2/12/2022] DULoxetine (CYMBALTA) capsule 120 mg  120 mg Oral DAILY    gabapentin (NEURONTIN) capsule 100 mg  100 mg Oral QHS    [START ON 2/12/2022] hydrALAZINE (APRESOLINE) tablet 50 mg  50 mg Oral TID    nitroglycerin (NITROSTAT) tablet 0.4 mg  0.4 mg SubLINGual Q5MIN PRN    [START ON 2/12/2022] pantoprazole (PROTONIX) tablet 40 mg  40 mg Oral ACB    bumetanide (BUMEX) injection 1 mg  1 mg IntraVENous BID    enoxaparin (LOVENOX) injection 30 mg  30 mg SubCUTAneous Q24H     Current Outpatient Medications   Medication Sig    carvediloL (Coreg) 12.5 mg tablet Take 12.5 mg by mouth two (2) times daily (with meals).  magnesium oxide (MAG-OX) 400 mg tablet Take 400 mg by mouth daily.  polyethylene glycol (Miralax) 17 gram packet Take 17 g by mouth daily as needed for Constipation.  omeprazole (PRILOSEC) 20 mg capsule Take 20 mg by mouth daily.  potassium chloride (KLOR-CON) 20 mEq pack Take 20 mEq by mouth every other day.  traZODone (DESYREL) 50 mg tablet Take 25 mg by mouth nightly.  bumetanide (BUMEX) 1 mg tablet Take 1 Tablet by mouth two (2) times a day for 30 days.  dilTIAZem IR (CARDIZEM) 30 mg tablet Take 1 Tablet by mouth Before breakfast, lunch, and dinner for 30 days.  ARIPiprazole (ABILIFY) 20 mg tablet Take 1 Tablet by mouth daily.  DULoxetine (CYMBALTA) 60 mg capsule Take 2 Capsules by mouth daily. Indications: anxiousness associated with depression    senna-docusate (Senna with Docusate Sodium) 8.6-50 mg per tablet Take 1 Tablet by mouth nightly.  ondansetron (ZOFRAN ODT) 4 mg disintegrating tablet Take 1 Tablet by mouth every eight (8) hours as needed for Nausea or Vomiting.  cilostazoL (PLETAL) 100 mg tablet Take 100 mg by mouth Before breakfast and dinner.  atorvastatin (LIPITOR) 40 mg tablet Take 1 Tablet by mouth nightly.     nitroglycerin (Nitrostat) 0.4 mg SL tablet 0.4 mg by SubLINGual route every five (5) minutes as needed for Chest Pain. Up to 3 doses.  clopidogreL (Plavix) 75 mg tab Take 75 mg by mouth daily (after breakfast).  carbidopa-levodopa (SINEMET)  mg per tablet Take 2 Tabs by mouth four (4) times daily.  acetaminophen (TYLENOL) 325 mg tablet Take 650 mg by mouth every six (6) hours as needed for Pain or Fever.  cyanocobalamin (VITAMIN B12) 100 mcg tablet Take 100 mcg by mouth daily.  Cholecalciferol, Vitamin D3, 1,000 unit cap Take 1,000 Units by mouth daily.  multivitamins-minerals-lutein (CENTRUM SILVER) tab tablet Take 1 Tablet by mouth daily. No Known Allergies     FmHx: family history includes Heart Attack (age of onset: 72) in her mother; Hypertension in her mother; Other in her father; Parkinson's Disease in her brother. Social Hx :  reports that she quit smoking about 53 years ago. Her smoking use included cigarettes. She has a 1.25 pack-year smoking history. She has never used smokeless tobacco. She reports previous alcohol use. She reports that she does not use drugs. Objective:    Physical Exam    Vitals:   Vitals:    02/11/22 1400 02/11/22 1430 02/11/22 1500 02/11/22 1530   BP: (!) 158/98 (!) 149/97 (!) 180/92 (!) 174/102   Pulse: (!) 103 87 (!) 106 (!) 105   Resp: 20 19 18 16   Temp:       SpO2: 100% 100% 99% 99%   Weight:       Height:           General:    Alert, cooperative, no distress, appears stated age. Neck:   Supple,    Back:     Symmetric,    Lungs:     Crackles bilateral bases. Heart[de-identified]    Irregularly irregular rate and rhythm. Grade II/VI systolic murmur at apex. Abdomen:     Soft, non-tender. Bowel sounds normal.    Extremities:   Trace BLE edema   Vascular:   Pulses - 2+   Skin:   Skin color normal. No rashes or lesions on visible areas   Neurologic:   Alert, oriented x3.   Left hand parkinsonian tremor noted       Telemetry: afib with RVR    ECG:   EKG Results     Procedure 720 Value Units Date/Time    EKG, 12 LEAD, INITIAL [[id number]] Collected: 02/11/22 1111    Order Status: Completed Updated: 02/11/22 1114     Ventricular Rate 106 BPM      QRS Duration 130 ms      Q-T Interval 378 ms      QTC Calculation (Bezet) 502 ms      Calculated R Axis -36 degrees      Calculated T Axis 173 degrees      Diagnosis --     Atrial fibrillation with rapid ventricular response  Left axis deviation  Left ventricular hypertrophy with QRS widening and repolarization abnormality   ( [place] product )  Cannot rule out Anteroseptal infarct , age undetermined  Abnormal ECG  When compared with ECG of 11-FEB-2022 09:14,  MANUAL COMPARISON REQUIRED, DATA IS UNCONFIRMED            Data Review:     Radiology:   XR Results (most recent):  Results from Hospital Encounter encounter on 02/11/22    XR CHEST PORT    Narrative  Indication: Hypoxia    Comparison: 1/20/2022    Portable exam of the chest obtained at 944 demonstrates stable cardiomegaly. The  patient is status post median sternotomy. Bilateral pulmonary infiltrates are  noted most likely related to pulmonary edema. Trace bilateral pleural effusions. Impression  Pulmonary edema and trace bilateral pleural effusions. No results for input(s): CPK, TROIQ in the last 72 hours. No lab exists for component: CKQMB, CPKMB, BMPP  Recent Labs     02/11/22  0920      K 4.2      CO2 24   BUN 28*   CREA 1.58*   *   CA 8.6     Recent Labs     02/11/22  0920   WBC 11.3*   HGB 10.7*   HCT 32.5*        Recent Labs     02/11/22  0920        No results for input(s): CHOL, LDLC in the last 72 hours. No lab exists for component: TGL, HDLC,  HBA1C  No results for input(s): CRP, TSH, TSHEXT in the last 72 hours. No lab exists for component: ESR    Gabriela Cardenas.  SUAD Sue    [facility], [address] [facility] [address]  [address] [facility]  [phone], [place], [place]

## 2022-02-12 LAB
ALBUMIN SERPL-MCNC: 2.9 G/DL (ref 3.5–5)
ALBUMIN/GLOB SERPL: 1 {RATIO} (ref 1.1–2.2)
ALP SERPL-CCNC: 79 U/L (ref 45–117)
ALT SERPL-CCNC: <6 U/L (ref 12–78)
ANION GAP SERPL CALC-SCNC: 6 MMOL/L (ref 5–15)
AST SERPL-CCNC: 12 U/L (ref 15–37)
BASOPHILS # BLD: 0 K/UL (ref 0–0.1)
BASOPHILS NFR BLD: 0 % (ref 0–1)
BILIRUB SERPL-MCNC: 0.9 MG/DL (ref 0.2–1)
BUN SERPL-MCNC: 28 MG/DL (ref 6–20)
BUN/CREAT SERPL: 20 (ref 12–20)
CALCIUM SERPL-MCNC: 8.4 MG/DL (ref 8.5–10.1)
CHLORIDE SERPL-SCNC: 107 MMOL/L (ref 97–108)
CO2 SERPL-SCNC: 25 MMOL/L (ref 21–32)
CREAT SERPL-MCNC: 1.37 MG/DL (ref 0.55–1.02)
DIFFERENTIAL METHOD BLD: ABNORMAL
EOSINOPHIL # BLD: 0.3 K/UL (ref 0–0.4)
EOSINOPHIL NFR BLD: 4 % (ref 0–7)
ERYTHROCYTE [DISTWIDTH] IN BLOOD BY AUTOMATED COUNT: 13.4 % (ref 11.5–14.5)
GLOBULIN SER CALC-MCNC: 2.9 G/DL (ref 2–4)
GLUCOSE SERPL-MCNC: 144 MG/DL (ref 65–100)
HCT VFR BLD AUTO: 27.8 % (ref 35–47)
HGB BLD-MCNC: 9.1 G/DL (ref 11.5–16)
IMM GRANULOCYTES # BLD AUTO: 0 K/UL (ref 0–0.04)
IMM GRANULOCYTES NFR BLD AUTO: 0 % (ref 0–0.5)
LYMPHOCYTES # BLD: 0.8 K/UL (ref 0.8–3.5)
LYMPHOCYTES NFR BLD: 12 % (ref 12–49)
MAGNESIUM SERPL-MCNC: 1.6 MG/DL (ref 1.6–2.4)
MCH RBC QN AUTO: 31.8 PG (ref 26–34)
MCHC RBC AUTO-ENTMCNC: 32.7 G/DL (ref 30–36.5)
MCV RBC AUTO: 97.2 FL (ref 80–99)
MONOCYTES # BLD: 0.6 K/UL (ref 0–1)
MONOCYTES NFR BLD: 9 % (ref 5–13)
NEUTS SEG # BLD: 5.1 K/UL (ref 1.8–8)
NEUTS SEG NFR BLD: 75 % (ref 32–75)
NRBC # BLD: 0 K/UL (ref 0–0.01)
NRBC BLD-RTO: 0 PER 100 WBC
PHOSPHATE SERPL-MCNC: 3.5 MG/DL (ref 2.6–4.7)
PLATELET # BLD AUTO: 135 K/UL (ref 150–400)
PMV BLD AUTO: 9.5 FL (ref 8.9–12.9)
POTASSIUM SERPL-SCNC: 3.6 MMOL/L (ref 3.5–5.1)
PROT SERPL-MCNC: 5.8 G/DL (ref 6.4–8.2)
RBC # BLD AUTO: 2.86 M/UL (ref 3.8–5.2)
RBC MORPH BLD: ABNORMAL
SODIUM SERPL-SCNC: 138 MMOL/L (ref 136–145)
WBC # BLD AUTO: 6.8 K/UL (ref 3.6–11)

## 2022-02-12 PROCEDURE — 36415 COLL VENOUS BLD VENIPUNCTURE: CPT

## 2022-02-12 PROCEDURE — 74011250637 HC RX REV CODE- 250/637: Performed by: NURSE PRACTITIONER

## 2022-02-12 PROCEDURE — 94762 N-INVAS EAR/PLS OXIMTRY CONT: CPT

## 2022-02-12 PROCEDURE — 85025 COMPLETE CBC W/AUTO DIFF WBC: CPT

## 2022-02-12 PROCEDURE — 77010033678 HC OXYGEN DAILY

## 2022-02-12 PROCEDURE — 83735 ASSAY OF MAGNESIUM: CPT

## 2022-02-12 PROCEDURE — 65660000001 HC RM ICU INTERMED STEPDOWN

## 2022-02-12 PROCEDURE — 74011250637 HC RX REV CODE- 250/637: Performed by: HOSPITALIST

## 2022-02-12 PROCEDURE — 74011000258 HC RX REV CODE- 258: Performed by: STUDENT IN AN ORGANIZED HEALTH CARE EDUCATION/TRAINING PROGRAM

## 2022-02-12 PROCEDURE — 74011250637 HC RX REV CODE- 250/637: Performed by: INTERNAL MEDICINE

## 2022-02-12 PROCEDURE — 74011000250 HC RX REV CODE- 250: Performed by: HOSPITALIST

## 2022-02-12 PROCEDURE — 74011000250 HC RX REV CODE- 250: Performed by: STUDENT IN AN ORGANIZED HEALTH CARE EDUCATION/TRAINING PROGRAM

## 2022-02-12 PROCEDURE — 74011250636 HC RX REV CODE- 250/636: Performed by: HOSPITALIST

## 2022-02-12 PROCEDURE — 84100 ASSAY OF PHOSPHORUS: CPT

## 2022-02-12 PROCEDURE — 99233 SBSQ HOSP IP/OBS HIGH 50: CPT | Performed by: SPECIALIST

## 2022-02-12 PROCEDURE — 80053 COMPREHEN METABOLIC PANEL: CPT

## 2022-02-12 RX ORDER — DILTIAZEM HYDROCHLORIDE 30 MG/1
30 TABLET, FILM COATED ORAL
Status: DISCONTINUED | OUTPATIENT
Start: 2022-02-12 | End: 2022-02-13

## 2022-02-12 RX ADMIN — ATORVASTATIN CALCIUM 40 MG: 40 TABLET, FILM COATED ORAL at 22:12

## 2022-02-12 RX ADMIN — BUMETANIDE 1 MG: 0.25 INJECTION INTRAMUSCULAR; INTRAVENOUS at 18:19

## 2022-02-12 RX ADMIN — CILOSTAZOL 100 MG: 50 TABLET ORAL at 18:18

## 2022-02-12 RX ADMIN — CILOSTAZOL 100 MG: 50 TABLET ORAL at 06:37

## 2022-02-12 RX ADMIN — ASPIRIN 81 MG CHEWABLE TABLET 81 MG: 81 TABLET CHEWABLE at 10:45

## 2022-02-12 RX ADMIN — CARBIDOPA AND LEVODOPA 2 TABLET: 25; 100 TABLET ORAL at 22:12

## 2022-02-12 RX ADMIN — DILTIAZEM HYDROCHLORIDE 30 MG: 30 TABLET, FILM COATED ORAL at 14:42

## 2022-02-12 RX ADMIN — CARBIDOPA AND LEVODOPA 2 TABLET: 25; 100 TABLET ORAL at 14:42

## 2022-02-12 RX ADMIN — BUMETANIDE 1 MG: 0.25 INJECTION INTRAMUSCULAR; INTRAVENOUS at 10:46

## 2022-02-12 RX ADMIN — CLOPIDOGREL BISULFATE 75 MG: 75 TABLET ORAL at 10:45

## 2022-02-12 RX ADMIN — CARVEDILOL 12.5 MG: 12.5 TABLET, FILM COATED ORAL at 18:18

## 2022-02-12 RX ADMIN — ARIPIPRAZOLE 20 MG: 5 TABLET ORAL at 10:45

## 2022-02-12 RX ADMIN — DILTIAZEM HYDROCHLORIDE 30 MG: 30 TABLET, FILM COATED ORAL at 18:18

## 2022-02-12 RX ADMIN — DILTIAZEM HYDROCHLORIDE 7.5 MG/HR: 5 INJECTION, SOLUTION INTRAVENOUS at 07:11

## 2022-02-12 RX ADMIN — Medication 1000 UNITS: at 10:45

## 2022-02-12 RX ADMIN — VITAM B12 100 MCG: 100 TAB at 10:46

## 2022-02-12 RX ADMIN — CARVEDILOL 12.5 MG: 12.5 TABLET, FILM COATED ORAL at 10:52

## 2022-02-12 RX ADMIN — DULOXETINE HYDROCHLORIDE 120 MG: 60 CAPSULE, DELAYED RELEASE ORAL at 10:45

## 2022-02-12 RX ADMIN — PANTOPRAZOLE SODIUM 40 MG: 40 TABLET, DELAYED RELEASE ORAL at 06:37

## 2022-02-12 RX ADMIN — CARBIDOPA AND LEVODOPA 2 TABLET: 25; 100 TABLET ORAL at 18:18

## 2022-02-12 RX ADMIN — ENOXAPARIN SODIUM 30 MG: 100 INJECTION SUBCUTANEOUS at 14:43

## 2022-02-12 RX ADMIN — CARBIDOPA AND LEVODOPA 2 TABLET: 25; 100 TABLET ORAL at 10:52

## 2022-02-12 NOTE — PROGRESS NOTES
0745:  Notified MD about frequent PVCs. MD stated, if pt converts to NSR, get EKG. 0800:  passed along to day shift RN.

## 2022-02-12 NOTE — PROGRESS NOTES
2/12/2022   Steff Burdick MD  [facility]    . Ashleigh Paz is a [de-identified] y.o. female   67-year-old nursing home resident with A. fib with RVR placed on Cardizem drip and had a mild troponin elevation with an EF of 45% she is a history of CABG she is not on anticoagulation was a previous anemia and the plan is rate control. Her heart rate on telemetry is 82 in A. fib blood pressure is 136 she is on diltiazem at 7.5. She denies chest pain she has some moderate shortness of breath she is a bit disheveled with her foods put across her chest and she spilled some of her drinks x4 and the nurse will be helping take care of her. Discussion/Plans/Recs  1. Atrial fibrillation with RVR on Cardizem drip no anticoagulation because of her anemia some consideration for watchman though I think is a nursing home patient with limited prognosis would consider options. On short acting Cardizem we will try to wean off diltiazem IV  Patient Vitals for the past 12 hrs:   Temp Pulse Resp BP SpO2   02/12/22 1400  96      02/12/22 1200  86      02/12/22 1122 98.7 °F (37.1 °C) 96 27 133/64 95 %   02/12/22 1046  84  119/75    02/12/22 1000  86      02/12/22 0636 98.9 °F (37.2 °C) 89 20 132/74 100 %   02/12/22 0558  72      02/12/22 0400 98.7 °F (37.1 °C) 69 19 (!) 144/64 100 %   02/12/22 0358  68      02/12/22 0314  82 24 (!) 141/69 100 %      2. Acute on chronic HFrEF (systolic)  90/88/05    ECHO ADULT FOLLOW-UP OR LIMITED 01/09/2022 1/9/2022    Interpretation Summary    Left Ventricle: Left ventricle size is normal. Mildly increased wall thickness. Mild global hypokinesis present. Mildly reduced left ventricular systolic function with a visually estimated EF of 40 - 45%.   Mitral Valve: Moderate posterior mitral annular calcification.   Pericardium: Left pleural effusion.   Contrast used: Definity.     Signed by: Anny Casey MD on 1/9/2022  2:52 PM In the past she had an ejection fraction in the range of 35% due to ischemic cardiomyopathy   on IV diuretics stabilizing Bumex 1 mg IV twice daily continue Coreg  Lab Results   Component Value Date/Time     (H) 06/08/2014 04:12 AM     (H) 06/04/2014 04:44 AM    NT pro-BNP 8,566 (H) 02/11/2022 09:20 AM    NT pro-BNP 11,361 (H) 01/17/2022 06:17 AM    NT pro-BNP 11,624 (H) 01/15/2022 10:13 AM    NT pro-BNP 21,802 (H) 01/10/2022 02:34 AM    NT pro-BNP 20,345 (H) 01/08/2022 02:41 AM      3.elevated troponin secondary to RVR follow-up labs stable  54- [phone]  Type 2 MI with supply demand mismatch    4. CAD with CABG  No angina, treat medically on aspirin/Plavix DAPT and Lipitor continue Coreg  5 CKD  Anemiaof chronic disease  Previously off ARB due MORENO  Lab Results   Component Value Date/Time    HGB 9.1 (L) 02/12/2022 04:08 AM     Lab Results   Component Value Date/Time    Creatinine (POC) 1.6 (H) 02/24/2020 10:32 AM    Creatinine 1.37 (H) 02/12/2022 04:08 AM      6 hypertension #7 left bundle branch block         Cardiac Studies/Hx:  -2007 PCI x2 to LAD with STEPHAN  -TTE 6/6/14 LVEF 55 % to 60 %. No RWMA. Small to mod pericardial effusion identified  circumferential to the heart, no evidence of hemodynamiccompromise. No significant change. From 6/3/14  -NM stress 6/6/14 Mildly reversal focal inferior wall defect concerning for myocardium at ischemic risk. Normal wall motion with an EF of 65 %. -TTE 6/3/14 LVEF 55 % to 60 %. No RWMA. LAE. Mod MAC. Mild TR. PASP 45mmHg. A small to moderate pericardial effusion was identified circumferential to the heart  -NM 2/15 At stress, there is diminished activity in the apex and inferior wall which is mildly improved at rest suspicious for ischemia. LVEF 70%. -cath 2/11/15 100% distal RCA, PDA fills from left. LAD ostial 70%, mid 99% within prior stents, distal 90%. Ostial LCX 90%, OM 1 80%. LV normal.  Surgical disease, she does not want to consider CABG. Past Medical History:   Diagnosis Date    Anxiety disorder     Atrial fibrillation (HCC)     CAD (coronary artery disease) 2007    stents, CABG x 3v    Carotid stenosis     Cervical stenosis of spinal canal 07/2019    Chronic kidney disease     Cough     CVA (cerebral vascular accident) ([facility] [address] 07/2019    left lacunar infarct at head of caudate    Depression     AND CHRONIC ANXIETY    Diabetes (HCC)     GERD (gastroesophageal reflux disease)     High cholesterol     History of peptic ulcer     Bleeding ulcer with increased NSAID use    Hypertension     Left bundle branch block 01/01/2022    Left carotid stenosis 07/2019    s/p left CEA with Dr. Marie Erazo, old 2007    PUD (peptic ulcer disease)     Stroke ([facility] [address]     Tremor     Valvular heart disease       ROS-pertinents  negative except as above  The pertinent portions of the medical history,physician and nursing notes, meds,vitals , labs and Ins/Outs,are reviewed in the electronic record. Results for orders placed or performed during the hospital encounter of 02/11/22   EKG, 12 LEAD, INITIAL   Result Value Ref Range    Ventricular Rate 106 BPM    QRS Duration 130 ms    Q-T Interval 378 ms    QTC Calculation (Bezet) 502 ms    Calculated R Axis -36 degrees    Calculated T Axis 173 degrees    Diagnosis       Atrial fibrillation with rapid ventricular response  Left bundle branch block  When compared with ECG of 11-FEB-2022 09:14,  No significant change  Confirmed by Toi Griggs ([id number]) on 2/11/2022 5:10:48 PM        01/07/22    ECHO ADULT FOLLOW-UP OR LIMITED 01/09/2022 1/9/2022    Interpretation Summary    Left Ventricle: Left ventricle size is normal. Mildly increased wall thickness. Mild global hypokinesis present. Mildly reduced left ventricular systolic function with a visually estimated EF of 40 - 45%.   Mitral Valve: Moderate posterior mitral annular calcification.   Pericardium: Left pleural effusion.     Contrast used: Definity.     Signed by: Cheyenne Lira MD on 1/9/2022  2:52 PM         Objective:    Physical Exam:   /64   Pulse 96   Temp 98.7 °F (37.1 °C)   Resp 27   Ht 5' 5\" (1.651 m)   Wt 166 lb 0.1 oz (75.3 kg)   SpO2 95%   BMI 27.62 kg/m²    General:  alert, cooperative, no distress, appears stated age   ENT, Neck:  no jvd   Chest Wall: inspection normal - no chest wall deformities or tenderness, respiratory effort normal   Lung: clear to auscultation bilaterally   Heart:  no gallops noted, irregularly irregular rhythm, systolic murmur 2/6 at apex   Abdomen: nondistended   Extremities: extremities normal, atraumatic, no cyanosis  1+ edema to the mid knee     Patient Vitals for the past 12 hrs:   Temp Pulse Resp BP SpO2   02/12/22 1400  96      02/12/22 1200  86      02/12/22 1122 98.7 °F (37.1 °C) 96 27 133/64 95 %   02/12/22 1046  84  119/75    02/12/22 1000  86      02/12/22 0636 98.9 °F (37.2 °C) 89 20 132/74 100 %   02/12/22 0558  72      02/12/22 0400 98.7 °F (37.1 °C) 69 19 (!) 144/64 100 %   02/12/22 0358  68      02/12/22 0314  82 24 (!) 141/69 100 %      Lab Results   Component Value Date/Time    WBC 6.8 02/12/2022 04:08 AM    HGB 9.1 (L) 02/12/2022 04:08 AM    HCT 27.8 (L) 02/12/2022 04:08 AM    PLATELET 972 (L) 32/40/6444 04:08 AM    MCV 97.2 02/12/2022 04:08 AM     Lab Results   Component Value Date/Time    Sodium 138 02/12/2022 04:08 AM    Potassium 3.6 02/12/2022 04:08 AM    Chloride 107 02/12/2022 04:08 AM    CO2 25 02/12/2022 04:08 AM    Anion gap 6 02/12/2022 04:08 AM    Glucose 144 (H) 02/12/2022 04:08 AM    BUN 28 (H) 02/12/2022 04:08 AM    Creatinine 1.37 (H) 02/12/2022 04:08 AM    BUN/Creatinine ratio 20 02/12/2022 04:08 AM    GFR est AA 45 (L) 02/12/2022 04:08 AM    GFR est non-AA 37 (L) 02/12/2022 04:08 AM    Calcium 8.4 (L) 02/12/2022 04:08 AM     Lab Results   Component Value Date/Time    CK 75 07/11/2019 09:35 AM    CK - MB 2.7 07/11/2019 09:35 AM    CK-MB Index 3.6 (H) 07/11/2019 09:35 AM    Troponin-I, Qt. 0.05 (H) 04/24/2021 04:54 AM     (H) 06/08/2014 04:12 AM     Lab Results   Component Value Date/Time     (H) 06/08/2014 04:12 AM     (H) 06/04/2014 04:44 AM    NT pro-BNP 8,566 (H) 02/11/2022 09:20 AM    NT pro-BNP 11,361 (H) 01/17/2022 06:17 AM    NT pro-BNP 11,624 (H) 01/15/2022 10:13 AM    NT pro-BNP 21,802 (H) 01/10/2022 02:34 AM    NT pro-BNP 20,345 (H) 01/08/2022 02:41 AM       reports that she quit smoking about 53 years ago. Her smoking use included cigarettes. She has a 1.25 pack-year smoking history. She has never used smokeless tobacco. She reports previous alcohol use. She reports that she does not use drugs. family history includes Heart Attack (age of onset: 72) in her mother; Hypertension in her mother; Other in her father; Parkinson's Disease in her brother. Last 24hr Input/Output:    Intake/Output Summary (Last 24 hours) at 2/12/2022 1502  Last data filed at 2/12/2022 1448  Gross per 24 hour   Intake    Output 850 ml   Net -850 ml        Data Review:   Lab Results   Component Value Date/Time    WBC 6.8 02/12/2022 04:08 AM    HGB 9.1 (L) 02/12/2022 04:08 AM    HCT 27.8 (L) 02/12/2022 04:08 AM    PLATELET 048 (L) 54/08/9670 04:08 AM    MCV 97.2 02/12/2022 04:08 AM     Lab Results   Component Value Date/Time    Sodium 138 02/12/2022 04:08 AM    Potassium 3.6 02/12/2022 04:08 AM    Chloride 107 02/12/2022 04:08 AM    CO2 25 02/12/2022 04:08 AM    Anion gap 6 02/12/2022 04:08 AM    Glucose 144 (H) 02/12/2022 04:08 AM    BUN 28 (H) 02/12/2022 04:08 AM    Creatinine 1.37 (H) 02/12/2022 04:08 AM    BUN/Creatinine ratio 20 02/12/2022 04:08 AM    GFR est AA 45 (L) 02/12/2022 04:08 AM    GFR est non-AA 37 (L) 02/12/2022 04:08 AM    Calcium 8.4 (L) 02/12/2022 04:08 AM    Bilirubin, total 0.9 02/12/2022 04:08 AM    Alk.  phosphatase 79 02/12/2022 04:08 AM    Protein, total 5.8 (L) 02/12/2022 04:08 AM    Albumin 2.9 (L) 02/12/2022 04:08 AM    Globulin 2.9 02/12/2022 04:08 AM    A-G Ratio 1.0 (L) 02/12/2022 04:08 AM    ALT (SGPT) <6 (L) 02/12/2022 04:08 AM    AST (SGOT) 12 (L) 02/12/2022 04:08 AM     Lab Results   Component Value Date/Time    CK 75 07/11/2019 09:35 AM    CK - MB 2.7 07/11/2019 09:35 AM    CK-MB Index 3.6 (H) 07/11/2019 09:35 AM    Troponin-I, Qt. 0.05 (H) 04/24/2021 04:54 AM     (H) 06/08/2014 04:12 AM     Lab Results   Component Value Date/Time     (H) 06/08/2014 04:12 AM     (H) 06/04/2014 04:44 AM    NT pro-BNP 8,566 (H) 02/11/2022 09:20 AM    NT pro-BNP 11,361 (H) 01/17/2022 06:17 AM    NT pro-BNP 11,624 (H) 01/15/2022 10:13 AM    NT pro-BNP 21,802 (H) 01/10/2022 02:34 AM    NT pro-BNP 20,345 (H) 01/08/2022 02:41 AM       Recent Results (from the past 24 hour(s))   TROPONIN-HIGH SENSITIVITY    Collection Time: 02/11/22  5:39 PM   Result Value Ref Range    Troponin-High Sensitivity 421 (HH) 0 - 51 ng/L   TROPONIN-HIGH SENSITIVITY    Collection Time: 02/11/22 10:21 PM   Result Value Ref Range    Troponin-High Sensitivity 324 (HH) 0 - 51 ng/L   CBC WITH AUTOMATED DIFF    Collection Time: 02/12/22  4:08 AM   Result Value Ref Range    WBC 6.8 3.6 - 11.0 K/uL    RBC 2.86 (L) 3.80 - 5.20 M/uL    HGB 9.1 (L) 11.5 - 16.0 g/dL    HCT 27.8 (L) 35.0 - 47.0 %    MCV 97.2 80.0 - 99.0 FL    MCH 31.8 26.0 - 34.0 PG    MCHC 32.7 30.0 - 36.5 g/dL    RDW 13.4 11.5 - 14.5 %    PLATELET 037 (L) 234 - 400 K/uL    MPV 9.5 8.9 - 12.9 FL    NRBC 0.0 0  WBC    ABSOLUTE NRBC 0.00 0.00 - 0.01 K/uL    NEUTROPHILS 75 32 - 75 %    LYMPHOCYTES 12 12 - 49 %    MONOCYTES 9 5 - 13 %    EOSINOPHILS 4 0 - 7 %    BASOPHILS 0 0 - 1 %    IMMATURE GRANULOCYTES 0 0.0 - 0.5 %    ABS. NEUTROPHILS 5.1 1.8 - 8.0 K/UL    ABS. LYMPHOCYTES 0.8 0.8 - 3.5 K/UL    ABS. MONOCYTES 0.6 0.0 - 1.0 K/UL    ABS. EOSINOPHILS 0.3 0.0 - 0.4 K/UL    ABS. BASOPHILS 0.0 0.0 - 0.1 K/UL    ABS. IMM.  GRANS. 0.0 0.00 - 0.04 K/UL    DF SMEAR SCANNED      RBC COMMENTS NORMOCYTIC, NORMOCHROMIC     METABOLIC PANEL, COMPREHENSIVE    Collection Time: 02/12/22  4:08 AM   Result Value Ref Range    Sodium 138 136 - 145 mmol/L    Potassium 3.6 3.5 - 5.1 mmol/L    Chloride 107 97 - 108 mmol/L    CO2 25 21 - 32 mmol/L    Anion gap 6 5 - 15 mmol/L    Glucose 144 (H) 65 - 100 mg/dL    BUN 28 (H) 6 - 20 MG/DL    Creatinine 1.37 (H) 0.55 - 1.02 MG/DL    BUN/Creatinine ratio 20 12 - 20      GFR est AA 45 (L) >60 ml/min/1.73m2    GFR est non-AA 37 (L) >60 ml/min/1.73m2    Calcium 8.4 (L) 8.5 - 10.1 MG/DL    Bilirubin, total 0.9 0.2 - 1.0 MG/DL    ALT (SGPT) <6 (L) 12 - 78 U/L    AST (SGOT) 12 (L) 15 - 37 U/L    Alk. phosphatase 79 45 - 117 U/L    Protein, total 5.8 (L) 6.4 - 8.2 g/dL    Albumin 2.9 (L) 3.5 - 5.0 g/dL    Globulin 2.9 2.0 - 4.0 g/dL    A-G Ratio 1.0 (L) 1.1 - 2.2     MAGNESIUM    Collection Time: 02/12/22  4:08 AM   Result Value Ref Range    Magnesium 1.6 1.6 - 2.4 mg/dL   PHOSPHORUS    Collection Time: 02/12/22  4:08 AM   Result Value Ref Range    Phosphorus 3.5 2.6 - 4.7 MG/DL          No future appointments.      Derwood Runner, MD 2/12/2022

## 2022-02-12 NOTE — PROGRESS NOTES
0800:  Verbal bedside report given to LAMONT Reno oncoming nurse by KOFFI Lama RN off-going nurse. Report included current pt status and condition, recent results, hx of present illness, heart rate and rhythm, and respiratory status.

## 2022-02-12 NOTE — PROGRESS NOTES
[id number]  Hospitalist Group                                                                                          Hospitalist Progress Note  Ivana Franco MD  Answering service: [phone] or [phone] from in house phone        Date of Service:  2022  NAME:  Ne Hernandez  :  1940  MRN:  [id number]      Admission Summary:   Patient is an 52-year-old female with a complex medical history including afib, chf, cad recent covid infection , CVA, PUD and DM, pt from SNF[facility],  presenting to ED with chest pain, hypoxia and shortness of breath. Interval history / Subjective:   HR now in 80's. Remains on diltiazem gtt. Restarted home diltiazem 30mg TID. Pt with multiple readmissions, typically for Afib with RVR. Recent COVID      Assessment & Plan:     Afib with RVR   - on diltizem gtt, wean as tolerated  - Add oral diltiazem 30 TID  - Cardiology following  - Monitor on telemetry   - Off AC due to recent GIB  - May need watchman device o/p     Chest pain - denies CP today, per report complained on admit. Suspect / Afib   CAD s/p CABG   - troponin flat  - Cards following  - normal perfusion scan   - Continue ASA, Plavix, statin     Acute on chronic systolic CHF - NYHA class IV on admit, last EF 22 40-45%  Acute hypoxic respiratory failure  - Continue GDMT --> coreg, ASA, statin  - Continue IV bumex   - Wean O2 as tolerated for O2 sat 90%  - I and O and daily weights  - Cardiology following    Anemia - chronic  - monitor and transfuse for hemoglobin less than 7   - previously recommended for bone marrow biopsy by Dr. Gemini Cedeño. - Consider re-consulting heme on Monday     H/o CVA   H/o L carotid stenosis s/p CEA - ASA, plavix, statin   Parkinsons disease - on sinemet   Anxiety/bipolar? - abilify, duloxetine      Code status: DDNR on file  Prophylaxis: [address] discussed with: pt, RN   Anticipated Disposition: Back to SNF in 24-48 hours. Once off dilt gtt      Hospital Problems  Date Reviewed: 1/17/2022          Codes Class Noted POA    Rapid atrial fibrillation [facility]) ICD-10-CM: I48.91  ICD-9-CM: 427.31  2/11/2022 Unknown        CHF exacerbation ([facility] [address] ICD-10-CM: I50.9  ICD-9-CM: 428.0  1/15/2022 Unknown                Review of Systems:   A comprehensive review of systems was negative except for that written in the HPI. Vital Signs:    Last 24hrs VS reviewed since prior progress note. Most recent are:  Visit Vitals  /64   Pulse 86   Temp 98.7 °F (37.1 °C)   Resp 27   Ht 5' 5\" (1.651 m)   Wt 75.3 kg (166 lb 0.1 oz)   SpO2 95%   BMI 27.62 kg/m²         Intake/Output Summary (Last 24 hours) at 2/12/2022 1258  Last data filed at 2/12/2022 1874  Gross per 24 hour   Intake    Output 550 ml   Net -550 ml        Physical Examination:     I had a face to face encounter with this patient and independently examined them on 2/12/2022 as outlined below:          Constitutional:  No acute distress, cooperative, pleasant    ENT:  Oral mucosa moist, oropharynx benign. Resp:  Exp wheezing, no increased work of breathing. No accessory muscle use. Able to speak in full sentences, saturating 92% on RA   CV:  Irregularly irregular, rate in 80's, no murmurs, gallops, rubs    GI:  Soft, non distended, non tender. normoactive bowel sounds, no hepatosplenomegaly     Musculoskeletal:  1+ edema, warm, 2+ pulses throughout    Neurologic:  Moves all extremities.   AAOx3, CN II-XII reviewed            Data Review:    Review and/or order of clinical lab test  Review and/or order of tests in the radiology section of CPT  Review and/or order of tests in the medicine section of CPT      Labs:     Recent Labs     02/12/22  0408 02/11/22  0920   WBC 6.8 11.3*   HGB 9.1* 10.7*   HCT 27.8* 32.5*   * 155     Recent Labs     02/12/22  0408 02/11/22  0920    138   K 3.6 4.2    108   CO2 25 24   BUN 28* 28*   CREA 1.37* 1.58*   * 227*   CA 8.4* 8.6 MG 1.6  --    PHOS 3.5  --      Recent Labs     02/12/22  0408 02/11/22  0920   ALT <6* 10*   AP 79 104   TBILI 0.9 0.7   TP 5.8* 6.3*   ALB 2.9* 3.3*   GLOB 2.9 3.0     No results for input(s): INR, PTP, APTT, INREXT in the last 72 hours. No results for input(s): FE, TIBC, PSAT, FERR in the last 72 hours. Lab Results   Component Value Date/Time    Folate 7.8 01/25/2022 05:07 AM      No results for input(s): PH, PCO2, PO2 in the last 72 hours. No results for input(s): CPK, CKNDX, TROIQ in the last 72 hours.     No lab exists for component: CPKMB  Lab Results   Component Value Date/Time    Cholesterol, total 148 09/23/2020 04:27 AM    HDL Cholesterol 52 09/23/2020 04:27 AM    LDL, calculated 83.8 09/23/2020 04:27 AM    Triglyceride 61 09/23/2020 04:27 AM    CHOL/HDL Ratio 2.8 09/23/2020 04:27 AM     Lab Results   Component Value Date/Time    Glucose (POC) 206 (H) 01/27/2022 11:03 AM    Glucose (POC) 177 (H) 01/27/2022 06:39 AM    Glucose (POC) 189 (H) 01/26/2022 10:36 PM    Glucose (POC) 270 (H) 01/26/2022 04:38 PM    Glucose (POC) 230 (H) 01/26/2022 11:21 AM     Lab Results   Component Value Date/Time    Color YELLOW/STRAW 12/30/2021 08:23 PM    Appearance CLOUDY (A) 12/30/2021 08:23 PM    Specific gravity 1.017 12/30/2021 08:23 PM    pH (UA) 6.0 12/30/2021 08:23 PM    Protein 100 (A) 12/30/2021 08:23 PM    Glucose Negative 12/30/2021 08:23 PM    Ketone Negative 12/30/2021 08:23 PM    Bilirubin Negative 12/30/2021 08:23 PM    Urobilinogen 1.0 12/30/2021 08:23 PM    Nitrites Negative 12/30/2021 08:23 PM    Leukocyte Esterase MODERATE (A) 12/30/2021 08:23 PM    Epithelial cells FEW 12/30/2021 08:23 PM    Bacteria 1+ (A) 12/30/2021 08:23 PM    WBC 0-4 12/30/2021 08:23 PM    RBC 0-5 12/30/2021 08:23 PM         Medications Reviewed:     Current Facility-Administered Medications   Medication Dose Route Frequency    dilTIAZem IR (CARDIZEM) tablet 30 mg  30 mg Oral TIDAC    dilTIAZem (CARDIZEM) 125 mg in dextrose 5% 125 mL infusion  0-15 mg/hr IntraVENous TITRATE    ARIPiprazole (ABILIFY) tablet 20 mg  20 mg Oral DAILY    atorvastatin (LIPITOR) tablet 40 mg  40 mg Oral QHS    carbidopa-levodopa (SINEMET)  mg per tablet 2 Tablet  2 Tablet Oral QID    carvediloL (COREG) tablet 12.5 mg  12.5 mg Oral BID WITH MEALS    cilostazoL (PLETAL) tablet 100 mg  100 mg Oral ACB&D    cholecalciferol (VITAMIN D3) (1000 Units /25 mcg) tablet 1,000 Units  1,000 Units Oral DAILY    clopidogreL (PLAVIX) tablet 75 mg  75 mg Oral DAILY AFTER BREAKFAST    cyanocobalamin (VITAMIN B12) tablet 100 mcg  100 mcg Oral DAILY    DULoxetine (CYMBALTA) capsule 120 mg  120 mg Oral DAILY    nitroglycerin (NITROSTAT) tablet 0.4 mg  0.4 mg SubLINGual Q5MIN PRN    pantoprazole (PROTONIX) tablet 40 mg  40 mg Oral ACB    bumetanide (BUMEX) injection 1 mg  1 mg IntraVENous BID    enoxaparin (LOVENOX) injection 30 mg  30 mg SubCUTAneous Q24H    aspirin chewable tablet 81 mg  81 mg Oral DAILY     ______________________________________________________________________  EXPECTED LENGTH OF STAY: - - -  ACTUAL LENGTH OF STAY:          1                 Kendal Angulo MD

## 2022-02-13 LAB
ANION GAP SERPL CALC-SCNC: 5 MMOL/L (ref 5–15)
BASOPHILS # BLD: 0 K/UL (ref 0–0.1)
BASOPHILS NFR BLD: 0 % (ref 0–1)
BUN SERPL-MCNC: 28 MG/DL (ref 6–20)
BUN/CREAT SERPL: 20 (ref 12–20)
CALCIUM SERPL-MCNC: 8.3 MG/DL (ref 8.5–10.1)
CHLORIDE SERPL-SCNC: 105 MMOL/L (ref 97–108)
CO2 SERPL-SCNC: 25 MMOL/L (ref 21–32)
CREAT SERPL-MCNC: 1.37 MG/DL (ref 0.55–1.02)
DIFFERENTIAL METHOD BLD: ABNORMAL
EOSINOPHIL # BLD: 0.2 K/UL (ref 0–0.4)
EOSINOPHIL NFR BLD: 4 % (ref 0–7)
ERYTHROCYTE [DISTWIDTH] IN BLOOD BY AUTOMATED COUNT: 13.2 % (ref 11.5–14.5)
GLUCOSE SERPL-MCNC: 190 MG/DL (ref 65–100)
HCT VFR BLD AUTO: 23.8 % (ref 35–47)
HGB BLD-MCNC: 7.8 G/DL (ref 11.5–16)
IMM GRANULOCYTES # BLD AUTO: 0 K/UL (ref 0–0.04)
IMM GRANULOCYTES NFR BLD AUTO: 0 % (ref 0–0.5)
LYMPHOCYTES # BLD: 0.5 K/UL (ref 0.8–3.5)
LYMPHOCYTES NFR BLD: 9 % (ref 12–49)
MAGNESIUM SERPL-MCNC: 1.6 MG/DL (ref 1.6–2.4)
MCH RBC QN AUTO: 31.3 PG (ref 26–34)
MCHC RBC AUTO-ENTMCNC: 32.8 G/DL (ref 30–36.5)
MCV RBC AUTO: 95.6 FL (ref 80–99)
MONOCYTES # BLD: 0.5 K/UL (ref 0–1)
MONOCYTES NFR BLD: 9 % (ref 5–13)
NEUTS SEG # BLD: 4.5 K/UL (ref 1.8–8)
NEUTS SEG NFR BLD: 78 % (ref 32–75)
NRBC # BLD: 0 K/UL (ref 0–0.01)
NRBC BLD-RTO: 0 PER 100 WBC
PHOSPHATE SERPL-MCNC: 3.1 MG/DL (ref 2.6–4.7)
PLATELET # BLD AUTO: 135 K/UL (ref 150–400)
PMV BLD AUTO: 9.5 FL (ref 8.9–12.9)
POTASSIUM SERPL-SCNC: 3.8 MMOL/L (ref 3.5–5.1)
RBC # BLD AUTO: 2.49 M/UL (ref 3.8–5.2)
RBC MORPH BLD: ABNORMAL
SODIUM SERPL-SCNC: 135 MMOL/L (ref 136–145)
WBC # BLD AUTO: 5.7 K/UL (ref 3.6–11)

## 2022-02-13 PROCEDURE — 80048 BASIC METABOLIC PNL TOTAL CA: CPT

## 2022-02-13 PROCEDURE — 74011250637 HC RX REV CODE- 250/637: Performed by: SPECIALIST

## 2022-02-13 PROCEDURE — 74011250636 HC RX REV CODE- 250/636: Performed by: SPECIALIST

## 2022-02-13 PROCEDURE — 74011250636 HC RX REV CODE- 250/636: Performed by: HOSPITALIST

## 2022-02-13 PROCEDURE — 84100 ASSAY OF PHOSPHORUS: CPT

## 2022-02-13 PROCEDURE — 99233 SBSQ HOSP IP/OBS HIGH 50: CPT | Performed by: SPECIALIST

## 2022-02-13 PROCEDURE — 77010033678 HC OXYGEN DAILY

## 2022-02-13 PROCEDURE — 74011000258 HC RX REV CODE- 258: Performed by: STUDENT IN AN ORGANIZED HEALTH CARE EDUCATION/TRAINING PROGRAM

## 2022-02-13 PROCEDURE — 74011250637 HC RX REV CODE- 250/637: Performed by: HOSPITALIST

## 2022-02-13 PROCEDURE — 74011250637 HC RX REV CODE- 250/637: Performed by: INTERNAL MEDICINE

## 2022-02-13 PROCEDURE — 94760 N-INVAS EAR/PLS OXIMETRY 1: CPT

## 2022-02-13 PROCEDURE — 65660000001 HC RM ICU INTERMED STEPDOWN

## 2022-02-13 PROCEDURE — 36415 COLL VENOUS BLD VENIPUNCTURE: CPT

## 2022-02-13 PROCEDURE — 83735 ASSAY OF MAGNESIUM: CPT

## 2022-02-13 PROCEDURE — 85025 COMPLETE CBC W/AUTO DIFF WBC: CPT

## 2022-02-13 PROCEDURE — 74011250637 HC RX REV CODE- 250/637: Performed by: NURSE PRACTITIONER

## 2022-02-13 PROCEDURE — 74011000250 HC RX REV CODE- 250: Performed by: HOSPITALIST

## 2022-02-13 PROCEDURE — 74011000250 HC RX REV CODE- 250: Performed by: STUDENT IN AN ORGANIZED HEALTH CARE EDUCATION/TRAINING PROGRAM

## 2022-02-13 RX ORDER — AMIODARONE HYDROCHLORIDE 200 MG/1
400 TABLET ORAL 2 TIMES DAILY
Status: DISCONTINUED | OUTPATIENT
Start: 2022-02-13 | End: 2022-02-14

## 2022-02-13 RX ORDER — DIGOXIN 0.25 MG/ML
250 INJECTION INTRAMUSCULAR; INTRAVENOUS EVERY 6 HOURS
Status: DISPENSED | OUTPATIENT
Start: 2022-02-13 | End: 2022-02-14

## 2022-02-13 RX ORDER — DILTIAZEM HYDROCHLORIDE 30 MG/1
60 TABLET, FILM COATED ORAL
Status: DISCONTINUED | OUTPATIENT
Start: 2022-02-13 | End: 2022-02-18 | Stop reason: HOSPADM

## 2022-02-13 RX ADMIN — DILTIAZEM HYDROCHLORIDE 30 MG: 30 TABLET, FILM COATED ORAL at 06:57

## 2022-02-13 RX ADMIN — ATORVASTATIN CALCIUM 40 MG: 40 TABLET, FILM COATED ORAL at 22:20

## 2022-02-13 RX ADMIN — CARBIDOPA AND LEVODOPA 2 TABLET: 25; 100 TABLET ORAL at 12:55

## 2022-02-13 RX ADMIN — DIGOXIN 250 MCG: 250 INJECTION, SOLUTION INTRAMUSCULAR; INTRAVENOUS; PARENTERAL at 17:04

## 2022-02-13 RX ADMIN — DILTIAZEM HYDROCHLORIDE 2.5 MG/HR: 5 INJECTION, SOLUTION INTRAVENOUS at 07:00

## 2022-02-13 RX ADMIN — AMIODARONE HYDROCHLORIDE 400 MG: 200 TABLET ORAL at 17:04

## 2022-02-13 RX ADMIN — CARBIDOPA AND LEVODOPA 2 TABLET: 25; 100 TABLET ORAL at 09:51

## 2022-02-13 RX ADMIN — ASPIRIN 81 MG CHEWABLE TABLET 81 MG: 81 TABLET CHEWABLE at 09:50

## 2022-02-13 RX ADMIN — CILOSTAZOL 100 MG: 50 TABLET ORAL at 06:57

## 2022-02-13 RX ADMIN — DULOXETINE HYDROCHLORIDE 120 MG: 60 CAPSULE, DELAYED RELEASE ORAL at 09:49

## 2022-02-13 RX ADMIN — CILOSTAZOL 100 MG: 50 TABLET ORAL at 17:04

## 2022-02-13 RX ADMIN — BUMETANIDE 1 MG: 0.25 INJECTION INTRAMUSCULAR; INTRAVENOUS at 17:06

## 2022-02-13 RX ADMIN — CARVEDILOL 12.5 MG: 12.5 TABLET, FILM COATED ORAL at 17:04

## 2022-02-13 RX ADMIN — CARVEDILOL 12.5 MG: 12.5 TABLET, FILM COATED ORAL at 09:52

## 2022-02-13 RX ADMIN — PANTOPRAZOLE SODIUM 40 MG: 40 TABLET, DELAYED RELEASE ORAL at 06:57

## 2022-02-13 RX ADMIN — CARBIDOPA AND LEVODOPA 2 TABLET: 25; 100 TABLET ORAL at 22:20

## 2022-02-13 RX ADMIN — ARIPIPRAZOLE 20 MG: 5 TABLET ORAL at 09:52

## 2022-02-13 RX ADMIN — Medication 1000 UNITS: at 09:50

## 2022-02-13 RX ADMIN — ENOXAPARIN SODIUM 30 MG: 100 INJECTION SUBCUTANEOUS at 14:51

## 2022-02-13 RX ADMIN — VITAM B12 100 MCG: 100 TAB at 09:52

## 2022-02-13 RX ADMIN — DILTIAZEM HYDROCHLORIDE 60 MG: 30 TABLET, FILM COATED ORAL at 17:04

## 2022-02-13 RX ADMIN — DILTIAZEM HYDROCHLORIDE 60 MG: 30 TABLET, FILM COATED ORAL at 12:55

## 2022-02-13 RX ADMIN — CLOPIDOGREL BISULFATE 75 MG: 75 TABLET ORAL at 09:56

## 2022-02-13 RX ADMIN — BUMETANIDE 1 MG: 0.25 INJECTION INTRAMUSCULAR; INTRAVENOUS at 09:55

## 2022-02-13 RX ADMIN — CARBIDOPA AND LEVODOPA 2 TABLET: 25; 100 TABLET ORAL at 17:06

## 2022-02-13 NOTE — PROGRESS NOTES
[address] [facility] Adult  Hospitalist Group                                                                                          Hospitalist Progress Note  Marilu Wise MD  Answering service: [phone] or [phone] from in house phone        Date of Service:  2022  NAME:  Britni Collado  :  1940  MRN:  [id number]      Admission Summary:   Patient is an 52-year-old female with a complex medical history including afib, chf, cad recent covid infection , CVA, PUD and DM, pt from SNF/[place],  presenting to ED with chest pain, hypoxia and shortness of breath. Interval history / Subjective:   Diltiazem increased this am. Pt without complaints, denies chest pain or SOB. HR now in the low 100's and back on diltiazem gtt at 2.5     Assessment & Plan:     Afib with RVR   - on diltizem gtt, wean as tolerated  - Increase PO diltiazem to 60 TID  - Cardiology following  - Monitor on telemetry   - Off AC due to recent GIB  - May need watchman device o/p     Chest pain - denies CP today, per report complained on admit. Suspect  Afib   CAD s/p CABG   - troponin flat  - Cards following  - normal perfusion scan   - Continue ASA, Plavix, statin     Acute on chronic systolic CHF - NYHA class IV on admit, last EF 22 40-45%  Acute hypoxic respiratory failure - resolved. - Continue GDMT --> coreg, ASA, statin  - Continue IV bumex --> likely can transition to PO soon, defer to cardiology   - Wean O2 as tolerated for O2 sat 90%  - I and O and daily weights  - Cardiology following     Anemia - chronic  - monitor and transfuse for hemoglobin less than 7   - previously recommended for bone marrow biopsy by Dr. Mingo Gaviria.    - Consider re-consulting heme on Monday     H/o CVA   H/o L carotid stenosis s/p CEA - ASA, plavix, statin   Parkinsons disease - on sinemet   Anxiety/bipolar? - abilify, duloxetine      Code status: DDNR on file  Prophylaxis: [address] & [address] discussed with: pt, RN   Anticipated Disposition: Back to SNF in 24-48 hours. Once off dilt gtt      Hospital Problems  Date Reviewed: 1/17/2022          Codes Class Noted POA    Rapid atrial fibrillation [facility]) ICD-10-CM: I48.91  ICD-9-CM: 427.31  2/11/2022 Unknown        CHF exacerbation ([facility] [address] ICD-10-CM: I50.9  ICD-9-CM: 428.0  1/15/2022 Unknown                Review of Systems:   A comprehensive review of systems was negative except for that written in the HPI. Vital Signs:    Last 24hrs VS reviewed since prior progress note. Most recent are:  Visit Vitals  BP (!) 146/78   Pulse (!) 106   Temp 99.2 °F (37.3 °C)   Resp 28   Ht 5' 5\" (1.651 m)   Wt 75.8 kg (167 lb 1.7 oz)   SpO2 94%   BMI 27.81 kg/m²         Intake/Output Summary (Last 24 hours) at 2/13/2022 1344  Last data filed at 2/13/2022 0349  Gross per 24 hour   Intake 579.43 ml   Output 1050 ml   Net -470.57 ml        Physical Examination:     I had a face to face encounter with this patient and independently examined them on 2/13/2022 as outlined below:          Constitutional:  No acute distress, cooperative, pleasant    ENT:  Oral mucosa moist, oropharynx benign. Resp:  Exp wheezing, no increased work of breathing. No accessory muscle use. Able to speak in full sentences, saturating 98% on RA   CV:  Irregularly irregular, rate in 80's, no murmurs, gallops, rubs    GI:  Soft, non distended, non tender. normoactive bowel sounds, no hepatosplenomegaly     Musculoskeletal:  1+ edema, warm, 2+ pulses throughout    Neurologic:  Moves all extremities.   AAOx3, CN II-XII reviewed            Data Review:    Review and/or order of clinical lab test  Review and/or order of tests in the radiology section of CPT  Review and/or order of tests in the medicine section of CPT      Labs:     Recent Labs     02/13/22  0332 02/12/22  0408   WBC 5.7 6.8   HGB 7.8* 9.1*   HCT 23.8* 27.8*   * 135*     Recent Labs     02/13/22  0332 02/12/22  0408 02/11/22  0920   * 138 138 K 3.8 3.6 4.2    107 108   CO2 25 25 24   BUN 28* 28* 28*   CREA 1.37* 1.37* 1.58*   * 144* 227*   CA 8.3* 8.4* 8.6   MG 1.6 1.6  --    PHOS 3.1 3.5  --      Recent Labs     02/12/22  0408 02/11/22  0920   ALT <6* 10*   AP 79 104   TBILI 0.9 0.7   TP 5.8* 6.3*   ALB 2.9* 3.3*   GLOB 2.9 3.0     No results for input(s): INR, PTP, APTT, INREXT, INREXT in the last 72 hours. No results for input(s): FE, TIBC, PSAT, FERR in the last 72 hours. Lab Results   Component Value Date/Time    Folate 7.8 01/25/2022 05:07 AM      No results for input(s): PH, PCO2, PO2 in the last 72 hours. No results for input(s): CPK, CKNDX, TROIQ in the last 72 hours.     No lab exists for component: CPKMB  Lab Results   Component Value Date/Time    Cholesterol, total 148 09/23/2020 04:27 AM    HDL Cholesterol 52 09/23/2020 04:27 AM    LDL, calculated 83.8 09/23/2020 04:27 AM    Triglyceride 61 09/23/2020 04:27 AM    CHOL/HDL Ratio 2.8 09/23/2020 04:27 AM     Lab Results   Component Value Date/Time    Glucose (POC) 206 (H) 01/27/2022 11:03 AM    Glucose (POC) 177 (H) 01/27/2022 06:39 AM    Glucose (POC) 189 (H) 01/26/2022 10:36 PM    Glucose (POC) 270 (H) 01/26/2022 04:38 PM    Glucose (POC) 230 (H) 01/26/2022 11:21 AM     Lab Results   Component Value Date/Time    Color YELLOW/STRAW 12/30/2021 08:23 PM    Appearance CLOUDY (A) 12/30/2021 08:23 PM    Specific gravity 1.017 12/30/2021 08:23 PM    pH (UA) 6.0 12/30/2021 08:23 PM    Protein 100 (A) 12/30/2021 08:23 PM    Glucose Negative 12/30/2021 08:23 PM    Ketone Negative 12/30/2021 08:23 PM    Bilirubin Negative 12/30/2021 08:23 PM    Urobilinogen 1.0 12/30/2021 08:23 PM    Nitrites Negative 12/30/2021 08:23 PM    Leukocyte Esterase MODERATE (A) 12/30/2021 08:23 PM    Epithelial cells FEW 12/30/2021 08:23 PM    Bacteria 1+ (A) 12/30/2021 08:23 PM    WBC 0-4 12/30/2021 08:23 PM    RBC 0-5 12/30/2021 08:23 PM         Medications Reviewed:     Current Facility-Administered Medications   Medication Dose Route Frequency    dilTIAZem IR (CARDIZEM) tablet 60 mg  60 mg Oral TIDAC    dilTIAZem (CARDIZEM) 125 mg in dextrose 5% 125 mL infusion  0-15 mg/hr IntraVENous TITRATE    ARIPiprazole (ABILIFY) tablet 20 mg  20 mg Oral DAILY    atorvastatin (LIPITOR) tablet 40 mg  40 mg Oral QHS    carbidopa-levodopa (SINEMET)  mg per tablet 2 Tablet  2 Tablet Oral QID    carvediloL (COREG) tablet 12.5 mg  12.5 mg Oral BID WITH MEALS    cilostazoL (PLETAL) tablet 100 mg  100 mg Oral ACB&D    cholecalciferol (VITAMIN D3) (1000 Units /25 mcg) tablet 1,000 Units  1,000 Units Oral DAILY    clopidogreL (PLAVIX) tablet 75 mg  75 mg Oral DAILY AFTER BREAKFAST    cyanocobalamin (VITAMIN B12) tablet 100 mcg  100 mcg Oral DAILY    DULoxetine (CYMBALTA) capsule 120 mg  120 mg Oral DAILY    nitroglycerin (NITROSTAT) tablet 0.4 mg  0.4 mg SubLINGual Q5MIN PRN    pantoprazole (PROTONIX) tablet 40 mg  40 mg Oral ACB    bumetanide (BUMEX) injection 1 mg  1 mg IntraVENous BID    enoxaparin (LOVENOX) injection 30 mg  30 mg SubCUTAneous Q24H    aspirin chewable tablet 81 mg  81 mg Oral DAILY     ______________________________________________________________________  EXPECTED LENGTH OF STAY: - - -  ACTUAL LENGTH OF STAY:          2                 Rachael Mckeon MD

## 2022-02-13 NOTE — PROGRESS NOTES
Bedside shift change report given to LAMONT Claudio (oncoming nurse) by John Garcia (offgoing nurse). Report included the following information SBAR, OR Summary, Procedure Summary, Intake/Output, MAR, Recent Results and Med Rec Status.

## 2022-02-13 NOTE — PROGRESS NOTES
2000: report received from Shadia MIGUEL; assumed care of patent. Pt. Resting comfortably in bed. Diltiazem gtt running @ 2.5mg/hr. 0730: Bedside and Verbal shift change report given to Shadia (oncoming nurse) by Pati Dejesus (offgoing nurse). Report included the following information SBAR, Kardex, Intake/Output, MAR, Recent Results, Cardiac Rhythm Afib BBB and Alarm Parameters . Problem: Pressure Injury - Risk of  Goal: *Prevention of pressure injury  Description: Document Gregorio Scale and appropriate interventions in the flowsheet. Outcome: Progressing Towards Goal  Note: Pressure Injury Interventions:       Moisture Interventions: Absorbent underpads,Internal/External urinary devices,Limit adult briefs,Maintain skin hydration (lotion/cream),Minimize layers,Check for incontinence Q2 hours and as needed    Activity Interventions: Assess need for specialty bed,PT/OT evaluation,Increase time out of bed    Mobility Interventions: Float heels,HOB 30 degrees or less,Pressure redistribution bed/mattress (bed type),PT/OT evaluation,Turn and reposition approx. every two hours(pillow and wedges)    Nutrition Interventions: Document food/fluid/supplement intake    Friction and Shear Interventions: Minimize layers,HOB 30 degrees or less,Apply protective barrier, creams and emollients       Problem: Falls - Risk of  Goal: *Absence of Falls  Description: Document Maylin Fall Risk and appropriate interventions in the flowsheet. Outcome: Progressing Towards Goal  Note: Fall Risk Interventions:  Mobility Interventions: Bed/chair exit alarm,Communicate number of staff needed for ambulation/transfer,Patient to call before getting OOB    Mentation Interventions: Adequate sleep, hydration, pain control,Bed/chair exit alarm,Increase mobility    Medication Interventions: Bed/chair exit alarm,Patient to call before getting OOB    Elimination Interventions:  Toileting schedule/hourly rounds,Patient to call for help with toileting needs,Call light in reach,Bed/chair exit alarm     Problem: Patient Education: Go to Patient Education Activity  Goal: Patient/Family Education  Outcome: Progressing Towards Goal     Problem: Risk for Spread of Infection  Goal: Prevent transmission of infectious organism to others  Description: Prevent the transmission of infectious organisms to other patients, staff members, and visitors.   Outcome: Progressing Towards Goal     Problem: Patient Education:  Go to Education Activity  Goal: Patient/Family Education  Outcome: Progressing Towards Goal

## 2022-02-13 NOTE — PROGRESS NOTES
2/13/2022   Sia Prince MD  [facility]    . Gloria Castro Ewa Parikh is a [de-identified] y.o. female   Sunday  On dilt -120  Comfortable   No complaints  Denies chest pain, heart palpitations , increasing edema, pre-syncope or shortness of breath at rest   No problems overnight, rhythm and hemodynamics stable -see vitals below       Saturday (yesterday's  Subj) . ... 81-year-old nursing home resident with A. fib with RVR placed on Cardizem drip and had a mild troponin elevation with an EF of 45% she is a history of CABG she is not on anticoagulation was a previous anemia and the plan is rate control. Her heart rate on telemetry is 82 in A. fib blood pressure is 136 she is on diltiazem at 7.5. She denies chest pain she has some moderate shortness of breath she is a bit disheveled with her foods put across her chest and she spilled some of her drinks x4 and the nurse will be helping take care of her. Discussion/Plans/Recs  1. Atrial fibrillation with RVR   on Cardizem drip   no anticoagulation because of her anemia   Will stop Dilt gtt and go to po amio and IV digoxin x 2  Lab Results   Component Value Date/Time    Creatinine (POC) 1.6 (H) 02/24/2020 10:32 AM    Creatinine 1.37 (H) 02/13/2022 03:32 AM        some consideration for watchman though I think is a nursing home patient with limited prognosis would consider options. On short acting Cardizem we will try to wean off diltiazem IV  Patient Vitals for the past 12 hrs:   Temp Pulse Resp BP SpO2   02/13/22 1400  (!) 122      02/13/22 1200  (!) 106      02/13/22 1058 99.2 °F (37.3 °C) (!) 101 28 (!) 146/78 94 %   02/13/22 1000  (!) 112      02/13/22 0800  (!) 101      02/13/22 0634 97.4 °F (36.3 °C) 84 19 123/62 95 %   02/13/22 0600  94      02/13/22 0400  76      02/13/22 0303  82 25 (!) 130/58 97 %      2.  Acute on chronic HFrEF (systolic)  60/42/74    ECHO ADULT FOLLOW-UP OR LIMITED 01/09/2022 1/9/2022    Interpretation Summary    Left Ventricle: Left ventricle size is normal. Mildly increased wall thickness. Mild global hypokinesis present. Mildly reduced left ventricular systolic function with a visually estimated EF of 40 - 45%.   Mitral Valve: Moderate posterior mitral annular calcification.   Pericardium: Left pleural effusion.   Contrast used: Definity. Signed by: Luis Alberto Alfredo MD on 1/9/2022  2:52 PM      In the past she had an ejection fraction in the range of 35% due to ischemic cardiomyopathy   on IV diuretics stabilizing Bumex 1 mg IV twice daily continue Coreg  Lab Results   Component Value Date/Time     (H) 06/08/2014 04:12 AM     (H) 06/04/2014 04:44 AM    NT pro-BNP 8,566 (H) 02/11/2022 09:20 AM    NT pro-BNP 11,361 (H) 01/17/2022 06:17 AM    NT pro-BNP 11,624 (H) 01/15/2022 10:13 AM    NT pro-BNP 21,802 (H) 01/10/2022 02:34 AM    NT pro-BNP 20,345 (H) 01/08/2022 02:41 AM      3. Elevated troponin secondary to RVR   follow-up labs stable  Troponin were 54- [phone]  Type 2 MI with supply demand mismatch    4. CAD with CABG  No angina, treat medically on aspirin/Plavix DAPT and Lipitor continue Coreg  5 CKD  Anemiaof chronic disease  Previously off ARB due MORENO  Lab Results   Component Value Date/Time    HGB 7.8 (L) 02/13/2022 03:32 AM     Lab Results   Component Value Date/Time    Creatinine (POC) 1.6 (H) 02/24/2020 10:32 AM    Creatinine 1.37 (H) 02/13/2022 03:32 AM      6 hypertension #7 left bundle branch block         Cardiac Studies/Hx:  -2007 PCI x2 to LAD with STEPHAN  -TTE 6/6/14 LVEF 55 % to 60 %. No RWMA. Small to mod pericardial effusion identified  circumferential to the heart, no evidence of hemodynamiccompromise. No significant change. From 6/3/14  -NM stress 6/6/14 Mildly reversal focal inferior wall defect concerning for myocardium at ischemic risk. Normal wall motion with an EF of 65 %. -TTE 6/3/14 LVEF 55 % to 60 %. No RWMA. LAE. Mod MAC.  Mild TR. PASP 45mmHg. A small to moderate pericardial effusion was identified circumferential to the heart  -NM 2/15 At stress, there is diminished activity in the apex and inferior wall which is mildly improved at rest suspicious for ischemia. LVEF 70%. -cath 2/11/15 100% distal RCA, PDA fills from left. LAD ostial 70%, mid 99% within prior stents, distal 90%. Ostial LCX 90%, OM 1 80%. LV normal.  Surgical disease, she does not want to consider CABG. Past Medical History:   Diagnosis Date    Anxiety disorder     Atrial fibrillation (HCC)     CAD (coronary artery disease) 2007    stents, CABG x 3v    Carotid stenosis     Cervical stenosis of spinal canal 07/2019    Chronic kidney disease     Cough     CVA (cerebral vascular accident) ([facility] [address] 07/2019    left lacunar infarct at head of caudate    Depression     AND CHRONIC ANXIETY    Diabetes (HCC)     GERD (gastroesophageal reflux disease)     High cholesterol     History of peptic ulcer     Bleeding ulcer with increased NSAID use    Hypertension     Left bundle branch block 01/01/2022    Left carotid stenosis 07/2019    s/p left CEA with Dr. Jones Mars, old 2007    PUD (peptic ulcer disease)     Stroke ([facility] [address]     Tremor     Valvular heart disease       ROS-pertinents  negative except as above  The pertinent portions of the medical history,physician and nursing notes, meds,vitals , labs and Ins/Outs,are reviewed in the electronic record.   Results for orders placed or performed during the hospital encounter of 02/11/22   EKG, 12 LEAD, INITIAL   Result Value Ref Range    Ventricular Rate 106 BPM    QRS Duration 130 ms    Q-T Interval 378 ms    QTC Calculation (Bezet) 502 ms    Calculated R Axis -36 degrees    Calculated T Axis 173 degrees    Diagnosis       Atrial fibrillation with rapid ventricular response  Left bundle branch block  When compared with ECG of 11-FEB-2022 09:14,  No significant change  Confirmed by Elton Tello ([id number]) on 2/11/2022 5:10:48 PM        01/07/22    ECHO ADULT FOLLOW-UP OR LIMITED 01/09/2022 1/9/2022    Interpretation Summary    Left Ventricle: Left ventricle size is normal. Mildly increased wall thickness. Mild global hypokinesis present. Mildly reduced left ventricular systolic function with a visually estimated EF of 40 - 45%.   Mitral Valve: Moderate posterior mitral annular calcification.   Pericardium: Left pleural effusion.   Contrast used: Definity.     Signed by: Chemo Rodriguez MD on 1/9/2022  2:52 PM         Objective:    Physical Exam:   BP (!) 146/78 Comment: RN notified  Pulse (!) 122   Temp 99.2 °F (37.3 °C)   Resp 28   Ht 5' 5\" (1.651 m)   Wt 167 lb 1.7 oz (75.8 kg)   SpO2 94%   BMI 27.81 kg/m²    General:  alert, cooperative, no distress, appears stated age   ENT, Neck:  no jvd   Chest Wall: inspection normal - no chest wall deformities or tenderness, respiratory effort normal   Lung: clear to auscultation bilaterally   Heart:  no gallops noted, irregularly irregular rhythm, systolic murmur 2/6 at apex   Abdomen: nondistended   Extremities: extremities normal, atraumatic, no cyanosis  1+ edema to the mid knee     Patient Vitals for the past 12 hrs:   Temp Pulse Resp BP SpO2   02/13/22 1400  (!) 122      02/13/22 1200  (!) 106      02/13/22 1058 99.2 °F (37.3 °C) (!) 101 28 (!) 146/78 94 %   02/13/22 1000  (!) 112      02/13/22 0800  (!) 101      02/13/22 0634 97.4 °F (36.3 °C) 84 19 123/62 95 %   02/13/22 0600  94      02/13/22 0400  76      02/13/22 0303  82 25 (!) 130/58 97 %      Lab Results   Component Value Date/Time    WBC 5.7 02/13/2022 03:32 AM    HGB 7.8 (L) 02/13/2022 03:32 AM    HCT 23.8 (L) 02/13/2022 03:32 AM    PLATELET 866 (L) 22/95/0614 03:32 AM    MCV 95.6 02/13/2022 03:32 AM     Lab Results   Component Value Date/Time    Sodium 135 (L) 02/13/2022 03:32 AM    Potassium 3.8 02/13/2022 03:32 AM    Chloride 105 02/13/2022 03:32 AM    CO2 25 02/13/2022 03:32 AM    Anion gap 5 02/13/2022 03:32 AM    Glucose 190 (H) 02/13/2022 03:32 AM    BUN 28 (H) 02/13/2022 03:32 AM    Creatinine 1.37 (H) 02/13/2022 03:32 AM    BUN/Creatinine ratio 20 02/13/2022 03:32 AM    GFR est AA 45 (L) 02/13/2022 03:32 AM    GFR est non-AA 37 (L) 02/13/2022 03:32 AM    Calcium 8.3 (L) 02/13/2022 03:32 AM     Lab Results   Component Value Date/Time    CK 75 07/11/2019 09:35 AM    CK - MB 2.7 07/11/2019 09:35 AM    CK-MB Index 3.6 (H) 07/11/2019 09:35 AM    Troponin-I, Qt. 0.05 (H) 04/24/2021 04:54 AM     (H) 06/08/2014 04:12 AM     Lab Results   Component Value Date/Time     (H) 06/08/2014 04:12 AM     (H) 06/04/2014 04:44 AM    NT pro-BNP 8,566 (H) 02/11/2022 09:20 AM    NT pro-BNP 11,361 (H) 01/17/2022 06:17 AM    NT pro-BNP 11,624 (H) 01/15/2022 10:13 AM    NT pro-BNP 21,802 (H) 01/10/2022 02:34 AM    NT pro-BNP 20,345 (H) 01/08/2022 02:41 AM       reports that she quit smoking about 53 years ago. Her smoking use included cigarettes. She has a 1.25 pack-year smoking history. She has never used smokeless tobacco. She reports previous alcohol use. She reports that she does not use drugs. family history includes Heart Attack (age of onset: 72) in her mother; Hypertension in her mother; Other in her father; Parkinson's Disease in her brother.    Last 24hr Input/Output:    Intake/Output Summary (Last 24 hours) at 2/13/2022 1409  Last data filed at 2/13/2022 0349  Gross per 24 hour   Intake 579.43 ml   Output 1050 ml   Net -470.57 ml        Data Review:   Lab Results   Component Value Date/Time    WBC 5.7 02/13/2022 03:32 AM    HGB 7.8 (L) 02/13/2022 03:32 AM    HCT 23.8 (L) 02/13/2022 03:32 AM    PLATELET 773 (L) 51/84/5969 03:32 AM    MCV 95.6 02/13/2022 03:32 AM     Lab Results   Component Value Date/Time    Sodium 135 (L) 02/13/2022 03:32 AM    Potassium 3.8 02/13/2022 03:32 AM    Chloride 105 02/13/2022 03:32 AM    CO2 25 02/13/2022 03:32 AM    Anion gap 5 02/13/2022 03:32 AM    Glucose 190 (H) 02/13/2022 03:32 AM    BUN 28 (H) 02/13/2022 03:32 AM    Creatinine 1.37 (H) 02/13/2022 03:32 AM    BUN/Creatinine ratio 20 02/13/2022 03:32 AM    GFR est AA 45 (L) 02/13/2022 03:32 AM    GFR est non-AA 37 (L) 02/13/2022 03:32 AM    Calcium 8.3 (L) 02/13/2022 03:32 AM    Bilirubin, total 0.9 02/12/2022 04:08 AM    Alk.  phosphatase 79 02/12/2022 04:08 AM    Protein, total 5.8 (L) 02/12/2022 04:08 AM    Albumin 2.9 (L) 02/12/2022 04:08 AM    Globulin 2.9 02/12/2022 04:08 AM    A-G Ratio 1.0 (L) 02/12/2022 04:08 AM    ALT (SGPT) <6 (L) 02/12/2022 04:08 AM    AST (SGOT) 12 (L) 02/12/2022 04:08 AM     Lab Results   Component Value Date/Time    CK 75 07/11/2019 09:35 AM    CK - MB 2.7 07/11/2019 09:35 AM    CK-MB Index 3.6 (H) 07/11/2019 09:35 AM    Troponin-I, Qt. 0.05 (H) 04/24/2021 04:54 AM     (H) 06/08/2014 04:12 AM     Lab Results   Component Value Date/Time     (H) 06/08/2014 04:12 AM     (H) 06/04/2014 04:44 AM    NT pro-BNP 8,566 (H) 02/11/2022 09:20 AM    NT pro-BNP 11,361 (H) 01/17/2022 06:17 AM    NT pro-BNP 11,624 (H) 01/15/2022 10:13 AM    NT pro-BNP 21,802 (H) 01/10/2022 02:34 AM    NT pro-BNP 20,345 (H) 01/08/2022 02:41 AM       Recent Results (from the past 24 hour(s))   MAGNESIUM    Collection Time: 02/13/22  3:32 AM   Result Value Ref Range    Magnesium 1.6 1.6 - 2.4 mg/dL   METABOLIC PANEL, BASIC    Collection Time: 02/13/22  3:32 AM   Result Value Ref Range    Sodium 135 (L) 136 - 145 mmol/L    Potassium 3.8 3.5 - 5.1 mmol/L    Chloride 105 97 - 108 mmol/L    CO2 25 21 - 32 mmol/L    Anion gap 5 5 - 15 mmol/L    Glucose 190 (H) 65 - 100 mg/dL    BUN 28 (H) 6 - 20 MG/DL    Creatinine 1.37 (H) 0.55 - 1.02 MG/DL    BUN/Creatinine ratio 20 12 - 20      GFR est AA 45 (L) >60 ml/min/1.73m2    GFR est non-AA 37 (L) >60 ml/min/1.73m2    Calcium 8.3 (L) 8.5 - 10.1 MG/DL   PHOSPHORUS    Collection Time: 02/13/22  3:32 AM   Result Value Ref Range    Phosphorus 3.1 2.6 - 4.7 MG/DL   CBC WITH AUTOMATED DIFF    Collection Time: 02/13/22  3:32 AM   Result Value Ref Range    WBC 5.7 3.6 - 11.0 K/uL    RBC 2.49 (L) 3.80 - 5.20 M/uL    HGB 7.8 (L) 11.5 - 16.0 g/dL    HCT 23.8 (L) 35.0 - 47.0 %    MCV 95.6 80.0 - 99.0 FL    MCH 31.3 26.0 - 34.0 PG    MCHC 32.8 30.0 - 36.5 g/dL    RDW 13.2 11.5 - 14.5 %    PLATELET 236 (L) 012 - 400 K/uL    MPV 9.5 8.9 - 12.9 FL    NRBC 0.0 0  WBC    ABSOLUTE NRBC 0.00 0.00 - 0.01 K/uL    NEUTROPHILS 78 (H) 32 - 75 %    LYMPHOCYTES 9 (L) 12 - 49 %    MONOCYTES 9 5 - 13 %    EOSINOPHILS 4 0 - 7 %    BASOPHILS 0 0 - 1 %    IMMATURE GRANULOCYTES 0 0.0 - 0.5 %    ABS. NEUTROPHILS 4.5 1.8 - 8.0 K/UL    ABS. LYMPHOCYTES 0.5 (L) 0.8 - 3.5 K/UL    ABS. MONOCYTES 0.5 0.0 - 1.0 K/UL    ABS. EOSINOPHILS 0.2 0.0 - 0.4 K/UL    ABS. BASOPHILS 0.0 0.0 - 0.1 K/UL    ABS. IMM. GRANS. 0.0 0.00 - 0.04 K/UL    DF SMEAR SCANNED      RBC COMMENTS MACROCYTOSIS  1+              No future appointments.      Rodolfo Thorpe MD 2/13/2022

## 2022-02-14 LAB
ANION GAP SERPL CALC-SCNC: 5 MMOL/L (ref 5–15)
BASOPHILS # BLD: 0 K/UL (ref 0–0.1)
BASOPHILS NFR BLD: 0 % (ref 0–1)
BUN SERPL-MCNC: 29 MG/DL (ref 6–20)
BUN/CREAT SERPL: 18 (ref 12–20)
CALCIUM SERPL-MCNC: 8.9 MG/DL (ref 8.5–10.1)
CHLORIDE SERPL-SCNC: 103 MMOL/L (ref 97–108)
CO2 SERPL-SCNC: 25 MMOL/L (ref 21–32)
CREAT SERPL-MCNC: 1.61 MG/DL (ref 0.55–1.02)
DIFFERENTIAL METHOD BLD: ABNORMAL
EOSINOPHIL # BLD: 0.2 K/UL (ref 0–0.4)
EOSINOPHIL NFR BLD: 4 % (ref 0–7)
ERYTHROCYTE [DISTWIDTH] IN BLOOD BY AUTOMATED COUNT: 13.4 % (ref 11.5–14.5)
GLUCOSE SERPL-MCNC: 177 MG/DL (ref 65–100)
HCT VFR BLD AUTO: 26.8 % (ref 35–47)
HGB BLD-MCNC: 8.8 G/DL (ref 11.5–16)
IMM GRANULOCYTES # BLD AUTO: 0 K/UL (ref 0–0.04)
IMM GRANULOCYTES NFR BLD AUTO: 0 % (ref 0–0.5)
LYMPHOCYTES # BLD: 0.7 K/UL (ref 0.8–3.5)
LYMPHOCYTES NFR BLD: 13 % (ref 12–49)
MAGNESIUM SERPL-MCNC: 1.7 MG/DL (ref 1.6–2.4)
MCH RBC QN AUTO: 31.5 PG (ref 26–34)
MCHC RBC AUTO-ENTMCNC: 32.8 G/DL (ref 30–36.5)
MCV RBC AUTO: 96.1 FL (ref 80–99)
MONOCYTES # BLD: 0.6 K/UL (ref 0–1)
MONOCYTES NFR BLD: 11 % (ref 5–13)
NEUTS SEG # BLD: 3.9 K/UL (ref 1.8–8)
NEUTS SEG NFR BLD: 72 % (ref 32–75)
NRBC # BLD: 0 K/UL (ref 0–0.01)
NRBC BLD-RTO: 0 PER 100 WBC
PHOSPHATE SERPL-MCNC: 3.7 MG/DL (ref 2.6–4.7)
PLATELET # BLD AUTO: 159 K/UL (ref 150–400)
PLATELET COMMENTS,PCOM: ABNORMAL
PMV BLD AUTO: 9.1 FL (ref 8.9–12.9)
POTASSIUM SERPL-SCNC: 4.3 MMOL/L (ref 3.5–5.1)
RBC # BLD AUTO: 2.79 M/UL (ref 3.8–5.2)
RBC MORPH BLD: ABNORMAL
SODIUM SERPL-SCNC: 133 MMOL/L (ref 136–145)
WBC # BLD AUTO: 5.4 K/UL (ref 3.6–11)

## 2022-02-14 PROCEDURE — 74011250637 HC RX REV CODE- 250/637: Performed by: INTERNAL MEDICINE

## 2022-02-14 PROCEDURE — 74011250637 HC RX REV CODE- 250/637: Performed by: NURSE PRACTITIONER

## 2022-02-14 PROCEDURE — 36415 COLL VENOUS BLD VENIPUNCTURE: CPT

## 2022-02-14 PROCEDURE — 74011250637 HC RX REV CODE- 250/637: Performed by: SPECIALIST

## 2022-02-14 PROCEDURE — 97530 THERAPEUTIC ACTIVITIES: CPT

## 2022-02-14 PROCEDURE — 74011250636 HC RX REV CODE- 250/636: Performed by: HOSPITALIST

## 2022-02-14 PROCEDURE — 84100 ASSAY OF PHOSPHORUS: CPT

## 2022-02-14 PROCEDURE — 74011250637 HC RX REV CODE- 250/637: Performed by: HOSPITALIST

## 2022-02-14 PROCEDURE — 80048 BASIC METABOLIC PNL TOTAL CA: CPT

## 2022-02-14 PROCEDURE — 99232 SBSQ HOSP IP/OBS MODERATE 35: CPT | Performed by: INTERNAL MEDICINE

## 2022-02-14 PROCEDURE — 83735 ASSAY OF MAGNESIUM: CPT

## 2022-02-14 PROCEDURE — 85025 COMPLETE CBC W/AUTO DIFF WBC: CPT

## 2022-02-14 PROCEDURE — 97161 PT EVAL LOW COMPLEX 20 MIN: CPT

## 2022-02-14 PROCEDURE — 99223 1ST HOSP IP/OBS HIGH 75: CPT | Performed by: INTERNAL MEDICINE

## 2022-02-14 PROCEDURE — 65660000001 HC RM ICU INTERMED STEPDOWN

## 2022-02-14 RX ORDER — ZOLPIDEM TARTRATE 5 MG/1
5 TABLET ORAL
Status: DISCONTINUED | OUTPATIENT
Start: 2022-02-14 | End: 2022-02-18 | Stop reason: HOSPADM

## 2022-02-14 RX ADMIN — DILTIAZEM HYDROCHLORIDE 60 MG: 30 TABLET, FILM COATED ORAL at 15:53

## 2022-02-14 RX ADMIN — ENOXAPARIN SODIUM 30 MG: 100 INJECTION SUBCUTANEOUS at 15:53

## 2022-02-14 RX ADMIN — Medication 1000 UNITS: at 09:23

## 2022-02-14 RX ADMIN — PANTOPRAZOLE SODIUM 40 MG: 40 TABLET, DELAYED RELEASE ORAL at 06:45

## 2022-02-14 RX ADMIN — CARBIDOPA AND LEVODOPA 2 TABLET: 25; 100 TABLET ORAL at 19:32

## 2022-02-14 RX ADMIN — DILTIAZEM HYDROCHLORIDE 60 MG: 30 TABLET, FILM COATED ORAL at 11:21

## 2022-02-14 RX ADMIN — DULOXETINE HYDROCHLORIDE 120 MG: 60 CAPSULE, DELAYED RELEASE ORAL at 09:25

## 2022-02-14 RX ADMIN — CARBIDOPA AND LEVODOPA 2 TABLET: 25; 100 TABLET ORAL at 13:48

## 2022-02-14 RX ADMIN — CILOSTAZOL 100 MG: 50 TABLET ORAL at 06:45

## 2022-02-14 RX ADMIN — AMIODARONE HYDROCHLORIDE 400 MG: 200 TABLET ORAL at 09:25

## 2022-02-14 RX ADMIN — ARIPIPRAZOLE 20 MG: 5 TABLET ORAL at 09:24

## 2022-02-14 RX ADMIN — ATORVASTATIN CALCIUM 40 MG: 40 TABLET, FILM COATED ORAL at 21:23

## 2022-02-14 RX ADMIN — CARVEDILOL 12.5 MG: 12.5 TABLET, FILM COATED ORAL at 09:25

## 2022-02-14 RX ADMIN — CARBIDOPA AND LEVODOPA 2 TABLET: 25; 100 TABLET ORAL at 21:23

## 2022-02-14 RX ADMIN — ASPIRIN 81 MG CHEWABLE TABLET 81 MG: 81 TABLET CHEWABLE at 09:25

## 2022-02-14 RX ADMIN — CLOPIDOGREL BISULFATE 75 MG: 75 TABLET ORAL at 09:23

## 2022-02-14 RX ADMIN — CARVEDILOL 12.5 MG: 12.5 TABLET, FILM COATED ORAL at 19:35

## 2022-02-14 RX ADMIN — DILTIAZEM HYDROCHLORIDE 60 MG: 30 TABLET, FILM COATED ORAL at 06:45

## 2022-02-14 RX ADMIN — CILOSTAZOL 100 MG: 50 TABLET ORAL at 15:53

## 2022-02-14 RX ADMIN — VITAM B12 100 MCG: 100 TAB at 09:25

## 2022-02-14 RX ADMIN — CARBIDOPA AND LEVODOPA 2 TABLET: 25; 100 TABLET ORAL at 09:25

## 2022-02-14 NOTE — PROGRESS NOTES
[facility]  Cardiology Care Note                      Patient Name: Ariana G[facility]:[id number]  Primary Cardiologist: Shawn Lorenzo MD        Subjective:  Ms. Cali Oropeza is an [de-identified] y.o. female with extensive PMH (see below) which includes afib, CAD CABG, stents, chronic LBBB,  who presented to ER on 2/11/22 with chief c/o chest pain and rapid heart rate- afib with RVR and CHF exacerbation. She was on coreg and placed on diltiazem gtt. Amiodarone was also started po and po dilatizem. Diltiazem gtt was stopped yesterday but then required resumption last evening briefly due to RVR. After diltiazem was restarted, she became bradycardic (HR In 50's) and diltiazem gtt was stopped. This a.m. HR is  (afib). Today she denies any chest pain, SOB, dizziness/lightheadedness or palpitations. HR is currently controlled-afib. Assessment and Plan     1. Afib with intermittent RVR/tachy-ham   -Continue po diltiazem IR 60 mg TID   -continue Amiodarone 400 mg po BID   -Continue Coreg 12.5 mg BID   -Difficult to manage HR, concern for tachy-ham syndrome. Dr. Adriano Hassan has seen pt in past and recommended AVN/BiV ppm if unable to control HR. Have asked Dr. Adriano Hassan to re-evaluate today. -[id number]. Has been off [employer] due to anemia. Some consideration of Watchman in past but would still need to be on [address] for at least 45 days post procedure. 2. Acute on chronic systolic CHF   -NYHA III-IV on admission. EF 40-45% (echo 1/9/22)   -Bumex on hold- creatinine trending up   -Continue Coreg. No ACE-I/ARB due to renal dysfunction     3. Elevated troponin   -Trops -570-61   -No chest pain now. -Troponin elevation due to type II MI (supply demand mismatch due to afib with RVR)    4. CAD:   -Hx of  CABG, 4/2021: PCI/STEPHAN native LAD , patent QUINTANA to LAD, VG to D1 to OM.       -Continue ASA, statin, plavix, BB  5. HTN   -BP labile. Continue coreg. 6. Elevated creatinine/hx of CKD   -creatinine trending back up   -Bumex on hold. 7. CKD:    -creatinine trending back up.   -Diuretic on hold. 8. Anemia, chronic   -Hgb stable 8.8   -denies any blood in stool. Last GI workup 12/2021 unrevealing    -Defer further eval to primary team (previously seen by Hematology-recommended BM biopsy)    9. Left carotid stenosis s/p CEA: asa, statin, plavix    Saw and evaluated pt and agree with above assessment and plan. HR is still very labile despite BB/CCB and amio with multiple recent admissions. Will consult EP for further evaluation tachy/ham with her afib. Dr. Ismael Payton to follow. Paco Coleman MD      Cardiac testing:  Nuclear stress test 1/11/2021: no ischemia or infarction  Echo 1/9/22: EF 40-45%,  Eco: 12/31/22: EF 55-60%, mild- mod MR  Echo: 8/26/21: EF 50-55%, mild-mod MR  Echo 4/26/21: EF 25-40%  Echo 4/3/2021: EF 30-35%  Echo 3/31/21: EF 25-30%  Other prior testing.   -2007 PCI x2 to LAD with STEPHAN  -cath 2/11/15 100% distal RCA, PDA fills from left. LAD ostial 70%, mid 99% within prior stents, distal 90%. Ostial LCX 90%, OM 1 80%. LV normal.  Surgical disease, she does not want to consider CABG.     ____________________________________________________________    HPI:  Ms. Azalea Coyne has PMH of HTN, atrial fibrillation, fluctuating LV function, CAD status post CABG and Status post PCI of native LAD in April 2021.  She does have severe three-vessel disease with a patent LIMA to the LAD and a patent graft sequential to the first diagonal obtuse marginal branch. Additional history includes LBBB,, diastolic dysfunction and fluctuating LV function, CVA, PAD with L CEA, AAA, renal artery stenosis, anemia, and Parkinsons, gastric ulcers with GIB in 2014 (normal mucosa by EGD 12/17/21), covid 19 last month. . Last hospitalized last month for CHF exacerbation and afib with RVR.   Presented 2/13/21 with chief c/o chest pain, afib with RVR. Patient Active Problem List   Diagnosis Code    Hypotension I95.9    Coronary artery disease I25.10    S/P coronary artery stent placement Z95.5    Renal failure N19    Elevated troponin R77.8    Pericardial effusion I31.3    Lactic acidosis E87.2    AF (atrial fibrillation) ([facility]) I48.91    Anemia D64.9    GI bleed K92.2    Syncope R55    Bradycardia R00.1    Acute kidney injury ([facility] [address] N17.9    Postoperative anemia due to acute blood loss D62    S/P CABG (coronary artery bypass graft) Z95.1    Edema R60.9    Diabetes mellitus ([facility]) E11.9    CKD (chronic kidney disease), stage III ([facility]) N18.30    Fall W19. Colon Areola    Acute ischemic stroke ([facility] [address] I63.9    HTN (hypertension) I10    Carotid artery stenosis, symptomatic, left I65.22    Generalized weakness R53.1    Weakness R53.1    UTI (urinary tract infection) N39.0    SOB (shortness of breath) R06.02    Cellulitis and abscess of lower extremity L03.119, L02.419    CAP (community acquired pneumonia) J18.9    Acute on chronic respiratory failure with hypoxia ([facility]) J96.21    CHF (congestive heart failure) ([facility]) I50.9    CHF exacerbation ([facility]) I50.9    Pulmonary edema J81.1    Atrial fibrillation with RVR ([facility]) I48.91    Acute respiratory failure with hypoxia ([facility]) J96.01    Acute on chronic systolic and diastolic heart failure, NYHA class 4 ([facility]) I50.43    Left bundle branch block I44.7    Rapid atrial fibrillation ([facility]) I48.91       Review of Systems:    [] Patient unable to provide secondary to condition    CONSTITUTIONAL: negative    ENT/MOUTH: negative  EYES: negative  CV: no chest pain   Respiratory: no SOB    GASTROINTESTINAL: negative   GENITOURINARY: negative   MUSCULOSKELETAL: negative  SKIN: negative  NEUROLOGICAL: negative   PSYCHOLOGICAL: negative    ENDOCRINE: negative        Past Medical History:   Diagnosis Date    Anxiety disorder     Atrial fibrillation ([facility])     CAD (coronary artery disease) 2007    stents, CABG x 3v    Carotid stenosis     Cervical stenosis of spinal canal 07/2019    Chronic kidney disease     Cough     CVA (cerebral vascular accident) ([facility] [address] 07/2019    left lacunar infarct at head of caudate    Depression     AND CHRONIC ANXIETY    Diabetes (HCC)     GERD (gastroesophageal reflux disease)     High cholesterol     History of peptic ulcer     Bleeding ulcer with increased NSAID use    Hypertension     Left bundle branch block 01/01/2022    Left carotid stenosis 07/2019    s/p left CEA with Dr. Liliam Chaney MI, old 2007    PUD (peptic ulcer disease)     Stroke ([facility] [address]     Tremor     Valvular heart disease      Past Surgical History:   Procedure Laterality Date    COLONOSCOPY N/A 12/17/2021    COLONOSCOPY   :- performed by [facility] ENDOSCOPY    HX CAROTID ENDARTERECTOMY  07/2019    HX CORONARY ARTERY BYPASS GRAFT      HX TONSILLECTOMY  1963    [place] CARDIAC SURG PROCEDURE UNLIST      CABG X3 VESSEL    OH TOTAL KNEE ARTHROPLASTY Right 2015     Current Facility-Administered Medications   Medication Dose Route Frequency    dilTIAZem IR (CARDIZEM) tablet 60 mg  60 mg Oral TIDAC    amiodarone (CORDARONE) tablet 400 mg  400 mg Oral BID    ARIPiprazole (ABILIFY) tablet 20 mg  20 mg Oral DAILY    atorvastatin (LIPITOR) tablet 40 mg  40 mg Oral QHS    carbidopa-levodopa (SINEMET)  mg per tablet 2 Tablet  2 Tablet Oral QID    carvediloL (COREG) tablet 12.5 mg  12.5 mg Oral BID WITH MEALS    cilostazoL (PLETAL) tablet 100 mg  100 mg Oral ACB&D    cholecalciferol (VITAMIN D3) (1000 Units /25 mcg) tablet 1,000 Units  1,000 Units Oral DAILY    clopidogreL (PLAVIX) tablet 75 mg  75 mg Oral DAILY AFTER BREAKFAST    cyanocobalamin (VITAMIN B12) tablet 100 mcg  100 mcg Oral DAILY    DULoxetine (CYMBALTA) capsule 120 mg  120 mg Oral DAILY    nitroglycerin (NITROSTAT) tablet 0.4 mg  0.4 mg SubLINGual Q5MIN PRN    pantoprazole (PROTONIX) tablet 40 mg  40 mg Oral ACB  [Held by provider] bumetanide (BUMEX) injection 1 mg  1 mg IntraVENous BID    enoxaparin (LOVENOX) injection 30 mg  30 mg SubCUTAneous Q24H    aspirin chewable tablet 81 mg  81 mg Oral DAILY       No Known Allergies     FmHx: family history includes Heart Attack (age of onset: 72) in her mother; Hypertension in her mother; Other in her father; Parkinson's Disease in her brother. Social Hx :  reports that she quit smoking about 53 years ago. Her smoking use included cigarettes. She has a 1.25 pack-year smoking history. She has never used smokeless tobacco. She reports previous alcohol use. She reports that she does not use drugs. Objective:    Physical Exam    Vitals:   Vitals:    02/14/22 0405 02/14/22 0554 02/14/22 0633 02/14/22 0645   BP:    (!) 171/82   Pulse:  75 100 96   Resp:   15 17   Temp:    98.2 °F (36.8 °C)   SpO2:   95% 97%   Weight: 73.6 kg (162 lb 4.1 oz)      Height:           General:    Alert, cooperative, no distress, appears stated age. Neck:   Supple,    Back:     Symmetric,    Lungs:     Few faint Crackles bases. No use of accessory muscles. Heart[de-identified]    Irregularly irregular rate and rhythm. Grade I- II/VI systolic murmur at apex. Abdomen:     Soft, non-tender. Bowel sounds normal.    Extremities:   No BLE edema   Vascular:   Pulses - 2+   Skin:   Skin color normal. No rashes or lesions on visible areas   Neurologic:   Alert, oriented x3.         Telemetry: afib with RVR    ECG:   EKG Results     Procedure 720 Value Units Date/Time    EKG, 12 LEAD, INITIAL [[id number]] Collected: 02/11/22 1111    Order Status: Completed Updated: 02/11/22 1711     Ventricular Rate 106 BPM      QRS Duration 130 ms      Q-T Interval 378 ms      QTC Calculation (Bezet) 502 ms      Calculated R Axis -36 degrees      Calculated T Axis 173 degrees      Diagnosis --     Atrial fibrillation with rapid ventricular response  Left bundle branch block  When compared with ECG of 11-FEB-2022 09:14,  No significant change  Confirmed by Kat Bernardo ([id number]) on 2/11/2022 5:10:48 PM            Data Review:     Radiology:   XR Results (most recent):  Results from [place] encounter on 02/11/22    XR CHEST PORT    Narrative  Indication: Hypoxia    Comparison: 1/20/2022    Portable exam of the chest obtained at 944 demonstrates stable cardiomegaly. The  patient is status post median sternotomy. Bilateral pulmonary infiltrates are  noted most likely related to pulmonary edema. Trace bilateral pleural effusions. Impression  Pulmonary edema and trace bilateral pleural effusions. No results for input(s): CPK, TROIQ in the last 72 hours. No lab exists for component: CKQMB, CPKMB, BMPP  Recent Labs     02/14/22 0104 02/13/22  0332   * 135*   K 4.3 3.8    105   CO2 25 25   BUN 29* 28*   CREA 1.61* 1.37*   * 190*   PHOS 3.7 3.1   CA 8.9 8.3*     Recent Labs     02/14/22 0104 02/13/22  0332   WBC 5.4 5.7   HGB 8.8* 7.8*   HCT 26.8* 23.8*    135*     Recent Labs     02/12/22  0408   AP 79     No results for input(s): CHOL, LDLC in the last 72 hours. No lab exists for component: TGL, HDLC,  HBA1C  No results for input(s): CRP, TSH, TSHEXT, TSHEXT in the last 72 hours. No lab exists for component: ESR    Yvonne De Souza.  SUAD Sue    [facility] [address] [address]  [facility]  [phone], [place], [place]

## 2022-02-14 NOTE — PROGRESS NOTES
1843: pt heart rate in the 130's-150's , sustaining in the 140's Dr. Barbara Reddy notified , orders to restart cardizem drip at 5mg/her

## 2022-02-14 NOTE — PROGRESS NOTES
[address] [facility]  Hospitalist Group                                                                                          Hospitalist Progress Note  Shannon Hensley MD  Answering service: [phone] OR [phone] from in house phone        Date of Service:  2022  NAME:  Eloit Newton  :  1940  MRN:  [id number]      Admission Summary:   Patient is an 52-year-old female with a complex medical history including afib, chf, cad recent covid infection , CVA, PUD and DM, pt from [facility],  presenting to ED with chest pain, hypoxia and shortness of breath. Interval history / Subjective:   Pt had increased HR to 150s late evening and required diltiazem gtt for an hour, and afterward had episode of bradycardia. Drip was discontinued. Today patient is doing well with HR in the 60's. No complaints. Assessment & Plan:     Afib with RVR   - s/p diltiazem gtt, now off due to bradycardia   - Continue PO diltiazem to 60 TID  - Continue coreg 12.5mg   - Cardiology following  - Monitor on telemetry   - Off AC due to recent GIB  - May need watchman device o/p   - Agree with AV note ablation and BiV PPM given patient's difficult to control Afib, as well as multiple readmissions for this issue. Chest pain - denies CP today, per report complained on admit. Suspect 2/2 Afib   CAD s/p CABG   Type 2 MI - demand from Afib with RVR  - troponin flat  - Cards following  - normal perfusion scan   - Continue ASA, Plavix, statin     Acute on chronic systolic CHF - NYHA class IV on admit, last EF 22 40-45%  Acute hypoxic respiratory failure - resolved. - Continue GDMT --> coreg, ASA, statin  - Hold bumex due to elevated creatinine. Transition to PO bumex when Cr improves.    - Wean O2 as tolerated for O2 sat 90%  - I and O and daily weights  - Cardiology following     Anemia - chronic  - monitor and transfuse for hemoglobin less than 7   - previously recommended for bone marrow biopsy by Dr. Gemini Cedeño. - Consult heme    H/o CVA   H/o L carotid stenosis s/p CEA - ASA, plavix, statin   Parkinsons disease - on sinemet   Anxiety/bipolar? - abilify, duloxetine      Code status: DDNR on file  Prophylaxis: [address] discussed with: pt, RN   Anticipated Disposition: Back to SNF in 24-48 hours. PT/OT consults     Hospital Problems  Date Reviewed: 1/17/2022          Codes Class Noted POA    Rapid atrial fibrillation [facility]) ICD-10-CM: I48.91  ICD-9-CM: 427.31  2/11/2022 Unknown        CHF exacerbation ([facility] [address] ICD-10-CM: I50.9  ICD-9-CM: 428.0  1/15/2022 Unknown                Review of Systems:   A comprehensive review of systems was negative except for that written in the HPI. Vital Signs:    Last 24hrs VS reviewed since prior progress note. Most recent are:  Visit Vitals  /63   Pulse 67   Temp 98 °F (36.7 °C)   Resp 18   Ht 5' 5\" (1.651 m)   Wt 73.6 kg (162 lb 4.1 oz)   SpO2 95%   BMI 27.00 kg/m²         Intake/Output Summary (Last 24 hours) at 2/14/2022 1426  Last data filed at 2/14/2022 0654  Gross per 24 hour   Intake    Output 300 ml   Net -300 ml        Physical Examination:     I had a face to face encounter with this patient and independently examined them on 2/14/2022 as outlined below:          Constitutional:  No acute distress, cooperative, pleasant    ENT:  Oral mucosa moist, oropharynx benign. Resp:  Exp wheezing, no increased work of breathing. No accessory muscle use. Able to speak in full sentences, saturating 98% on RA   CV:  Irregularly irregular, rate in 60's, no murmurs, gallops, rubs    GI:  Soft, non distended, non tender. normoactive bowel sounds, no hepatosplenomegaly     Musculoskeletal:  1+ edema, warm, 2+ pulses throughout    Neurologic:  Moves all extremities.   AAOx3, CN II-XII reviewed            Data Review:    Review and/or order of clinical lab test  Review and/or order of tests in the radiology section of C[facility]  Review and/or order of tests in the medicine section of [facility]      Labs:     Recent Labs     02/14/22  0104 02/13/22 0332   WBC 5.4 5.7   HGB 8.8* 7.8*   HCT 26.8* 23.8*    135*     Recent Labs     02/14/22  0104 02/13/22 0332 02/12/22 0408   * 135* 138   K 4.3 3.8 3.6    105 107   CO2 25 25 25   BUN 29* 28* 28*   CREA 1.61* 1.37* 1.37*   * 190* 144*   CA 8.9 8.3* 8.4*   MG 1.7 1.6 1.6   PHOS 3.7 3.1 3.5     Recent Labs     02/12/22 0408   ALT <6*   AP 79   TBILI 0.9   TP 5.8*   ALB 2.9*   GLOB 2.9     No results for input(s): INR, PTP, APTT, INREXT, INREXT in the last 72 hours. No results for input(s): FE, TIBC, PSAT, FERR in the last 72 hours. Lab Results   Component Value Date/Time    Folate 7.8 01/25/2022 05:07 AM      No results for input(s): PH, PCO2, PO2 in the last 72 hours. No results for input(s): CPK, CKNDX, TROIQ in the last 72 hours.     No lab exists for component: CPKMB  Lab Results   Component Value Date/Time    Cholesterol, total 148 09/23/2020 04:27 AM    HDL Cholesterol 52 09/23/2020 04:27 AM    LDL, calculated 83.8 09/23/2020 04:27 AM    Triglyceride 61 09/23/2020 04:27 AM    CHOL/HDL Ratio 2.8 09/23/2020 04:27 AM     Lab Results   Component Value Date/Time    Glucose (POC) 206 (H) 01/27/2022 11:03 AM    Glucose (POC) 177 (H) 01/27/2022 06:39 AM    Glucose (POC) 189 (H) 01/26/2022 10:36 PM    Glucose (POC) 270 (H) 01/26/2022 04:38 PM    Glucose (POC) 230 (H) 01/26/2022 11:21 AM     Lab Results   Component Value Date/Time    Color YELLOW/STRAW 12/30/2021 08:23 PM    Appearance CLOUDY (A) 12/30/2021 08:23 PM    Specific gravity 1.017 12/30/2021 08:23 PM    pH (UA) 6.0 12/30/2021 08:23 PM    Protein 100 (A) 12/30/2021 08:23 PM    Glucose Negative 12/30/2021 08:23 PM    Ketone Negative 12/30/2021 08:23 PM    Bilirubin Negative 12/30/2021 08:23 PM    Urobilinogen 1.0 12/30/2021 08:23 PM    Nitrites Negative 12/30/2021 08:23 PM    Leukocyte Esterase MODERATE (A) 12/30/2021 08:23 PM    Epithelial cells FEW 12/30/2021 08:23 PM    Bacteria 1+ (A) 12/30/2021 08:23 PM    WBC 0-4 12/30/2021 08:23 PM    RBC 0-5 12/30/2021 08:23 PM         Medications Reviewed:     Current Facility-Administered Medications   Medication Dose Route Frequency    dilTIAZem IR (CARDIZEM) tablet 60 mg  60 mg Oral TIDAC    amiodarone (CORDARONE) tablet 400 mg  400 mg Oral BID    ARIPiprazole (ABILIFY) tablet 20 mg  20 mg Oral DAILY    atorvastatin (LIPITOR) tablet 40 mg  40 mg Oral QHS    carbidopa-levodopa (SINEMET)  mg per tablet 2 Tablet  2 Tablet Oral QID    carvediloL (COREG) tablet 12.5 mg  12.5 mg Oral BID WITH MEALS    cilostazoL (PLETAL) tablet 100 mg  100 mg Oral ACB&D    cholecalciferol (VITAMIN D3) (1000 Units /25 mcg) tablet 1,000 Units  1,000 Units Oral DAILY    clopidogreL (PLAVIX) tablet 75 mg  75 mg Oral DAILY AFTER BREAKFAST    cyanocobalamin (VITAMIN B12) tablet 100 mcg  100 mcg Oral DAILY    DULoxetine (CYMBALTA) capsule 120 mg  120 mg Oral DAILY    nitroglycerin (NITROSTAT) tablet 0.4 mg  0.4 mg SubLINGual Q5MIN PRN    pantoprazole (PROTONIX) tablet 40 mg  40 mg Oral ACB    [Held by provider] bumetanide (BUMEX) injection 1 mg  1 mg IntraVENous BID    enoxaparin (LOVENOX) injection 30 mg  30 mg SubCUTAneous Q24H    aspirin chewable tablet 81 mg  81 mg Oral DAILY     ______________________________________________________________________  EXPECTED LENGTH OF STAY: - - -  ACTUAL LENGTH OF STAY:          3                 Raymundo Bateman MD

## 2022-02-14 NOTE — PROGRESS NOTES
Bedside shift change report given to LAMONT Velarde (oncoming nurse) by Tara Restrepo (offgoing nurse). Report included the following information SBAR, Procedure Summary, Intake/Output, MAR, Recent Results and Med Rec Status.

## 2022-02-14 NOTE — PROGRESS NOTES
Problem: Mobility Impaired (Adult and Pediatric)  Goal: *Acute Goals and Plan of Care (Insert Text)  Description: FUNCTIONAL STATUS PRIOR TO ADMISSION: The patient was functional at the wheelchair level and reports that she required assist x 1 for transfers to the chair. HOME SUPPORT PRIOR TO ADMISSION: The patient lived at a SNF. Physical Therapy Goals  Initiated 2/14/2022  1. Patient will move from supine to sit and sit to supine , scoot up and down, and roll side to side in bed with minimal assistance/contact guard assist within 7 day(s). 2.  Patient will transfer from bed to chair and chair to bed with minimal assistance/contact guard assist using the least restrictive device within 7 day(s). 3.  Patient will perform sit to stand with minimal assistance/contact guard assist within 7 day(s). 4.  Patient will participate in LE ex program within 7 day(s). Outcome: Progressing Towards Goal   PHYSICAL THERAPY EVALUATION  Patient: Lloyd Thurston (89 y.o. female)  Date: 2/14/2022  Primary Diagnosis: CHF exacerbation ([facility] [address] [I50.9]  Rapid atrial fibrillation (HCC) [I48.91]        Precautions:   Fall,Contact,Skin    ASSESSMENT  Based on the objective data described below, the patient presents with generalized weakness, and some slight confusion (fully oriented X 3 and partially oriented X 1), and was received in a large , had not rung out for cleanup and did not tell me until after I had been in her room for at least 15 minutes. She had to roll multiple times for the extensive clean up and note some fatigue after this level of activity but VSS on room air. Pt is known to this PT from multiple admissions, this time with CHF exacerbation and rapid a fib. Anticipate slow steady gains and return to SNF, LTC.     Visit Vitals  /64 (BP 1 Location: Left upper arm, BP Patient Position: Semi fowlers)   Pulse 63   Temp 98 °F (36.7 °C)   Resp 24   Ht 5' 5\" (1.651 m)   Wt 73.6 kg (162 lb 4.1 oz)   SpO2 on room air 99%   BMI 27.00 kg/m²        Current Level of Function Impacting Discharge (mobility/balance): max assist to roll    Functional Outcome Measure: The patient scored 15/100 on the Barthel outcome measure which is indicative of totally dependent for mobility and self care. Other factors to consider for discharge: a fib, HTN, CKD 4, Parkinson's Disease, DM previous CVA, CHF, recent COVID (last month)       Patient will benefit from skilled therapy intervention to address the above noted impairments. PLAN :  Recommendations and Planned Interventions: bed mobility training, transfer training, therapeutic exercises, patient and family training/education, and therapeutic activities      Frequency/Duration: Patient will be followed by physical therapy:  3 times a week to address goals. Recommendation for discharge: (in order for the patient to meet his/her long term goals)  Therapy up to 5 days/week in SNF setting    This discharge recommendation:  A follow-up discussion with the attending provider and/or case management is planned    IF patient discharges home will need the following DME: patient owns DME required for discharge         SUBJECTIVE:   Patient stated Amanda Preciado had a bowel movement this morning and I need to be cleaned up.  Pt told me that after I had been in her room for a least 15 minutes.     OBJECTIVE DATA SUMMARY:   HISTORY:    Past Medical History:   Diagnosis Date    Anxiety disorder     Atrial fibrillation ([facility] [address]     CAD (coronary artery disease) 2007    stents, CABG x 3v    Carotid stenosis     Cervical stenosis of spinal canal 07/2019    Chronic kidney disease     Cough     CVA (cerebral vascular accident) ([facility] [address] 07/2019    left lacunar infarct at head of caudate    Depression     AND CHRONIC ANXIETY    Diabetes ([facility] [address]     GERD (gastroesophageal reflux disease)     High cholesterol     History of peptic ulcer     Bleeding ulcer with increased NSAID use    Hypertension     Left bundle branch block 2022    Left carotid stenosis 2019    s/p left CEA with Dr. Toan Caceres, old 2007    PUD (peptic ulcer disease)     Stroke ([facility] [address]     Tremor     Valvular heart disease      Past Surgical History:   Procedure Laterality Date    COLONOSCOPY N/A 2021    COLONOSCOPY   :- [facility]COPY    HX CAROTID ENDARTERECTOMY  2019    HX CORONARY ARTERY BYPASS GRAFT      HX TONSILLECTOMY  1963    OR CARDIAC SURG PROCEDURE UNLIST      CABG X3 VESSEL    OR TOTAL KNEE ARTHROPLASTY Right 2015       Personal factors and/or comorbidities impacting plan of care: a fib, HTN, CKD 4, Parkinson's Disease, DM previous CVA, CHF, recent COVID (last month)    Home Situation  Home Environment: Rehabilitation facility  Support Systems: Other Family Member(s)  Patient Expects to be Discharged to[de-identified] [address] [facility]  Current DME Used/Available at Home: Wheelchair,Walker, rolling    EXAMINATION/PRESENTATION/DECISION MAKING:   Critical Behavior:  Neurologic State: Alert  Orientation Level: Oriented to place,Oriented to situation,Oriented to time (partially oriented to situation)  Cognition: Follows commands,Decreased attention/concentration  Safety/Judgement: Decreased awareness of environment  Hearing:     Skin:  refer to MD and nursing notes  Edema: none noted  Range Of Motion:  AROM: Generally decreased, functional                       Strength:    Strength: Generally decreased, functional                    Tone & Sensation:                                  Coordination:     Vision:      Functional Mobility:  Bed Mobility:  Rolling: Maximum assistance           Transfers:                             Balance:      Ambulation/Gait Training:                                                         Stairs:               Therapeutic Exercises:       Functional Measure:  Barthel Index:    Bathin  Bladder: 0  Bowels: 0  Groomin  Dressin  Feeding: 5  Mobility: 0  Stairs: 0  Toilet Use: 0  Transfer (Bed to Chair and Back): 5  Total: 15/100       The Barthel ADL Index: Guidelines  1. The index should be used as a record of what a patient does, not as a record of what a patient could do. 2. The main aim is to establish degree of independence from any help, physical or verbal, however minor and for whatever reason. 3. The need for supervision renders the patient not independent. 4. A patient's performance should be established using the best available evidence. Asking the patient, friends/relatives and nurses are the usual sources, but direct observation and common sense are also important. However direct testing is not needed. 5. Usually the patient's performance over the preceding 24-48 hours is important, but occasionally longer periods will be relevant. 6. Middle categories imply that the patient supplies over 50 per cent of the effort. 7. Use of aids to be independent is allowed. Score Interpretation (from [address] [address])    Independent   60-79 Minimally independent   40-59 Partially dependent   20-39 Very dependent   <20 Totally dependent     -Ramesh Pace., Barthel, D.W. (1965). Functional evaluation: the Barthel Index. [facility] ([address] (1997). The Barthel activities of daily living index: self-reporting versus actual performance in the old (> or = 75 years). Journal of [address] [place] [address] 45(7), [facility], J.RennyRennyF, Jordan Baker., Mary Peguero. (1999). Measuring the change in disability after inpatient rehabilitation; comparison of the responsiveness of the Barthel Index and Functional [place] Measure. Journal of Neurology, Neurosurgery, and Psychiatry, 66(4), 619-789. Jamil Moore, N.J.A, APARNA Ventura, & Abundio Cordero MRennyA. (2004) Assessment of post-stroke quality of life in cost-effectiveness studies: The usefulness of the Barthel Index and the EuroQoL-5D. Quality of Life Research, 15, 033-32           Physical Therapy Evaluation Charge Determination   History Examination Presentation Decision-Making   HIGH Complexity :3+ comorbidities / personal factors will impact the outcome/ POC  MEDIUM Complexity : 3 Standardized tests and measures addressing body structure, function, activity limitation and / or participation in recreation  LOW Complexity : Stable, uncomplicated  LOW Complexity : FOTO score of       Based on the above components, the patient evaluation is determined to be of the following complexity level: LOW     Pain Rating:  None rated    Activity Tolerance:   SpO2 stable on RA    After treatment patient left in no apparent distress:   Supine in bed, Call bell within reach, and nursing present completing clean up. COMMUNICATION/EDUCATION:   The patients plan of care was discussed with: Registered nurse. Fall prevention education was provided and the patient/caregiver indicated understanding., Patient/family have participated as able in goal setting and plan of care. , and Patient/family agree to work toward stated goals and plan of care.     Thank you for this referral.  Yann Corral   Time Calculation: 37 mins

## 2022-02-14 NOTE — PROGRESS NOTES
2351:   pt heart rate sustaining in 50s-60s, blood pressure 92/52. Stopped Cardizem drip and Notified NP. Will continue to monitor.

## 2022-02-14 NOTE — PROGRESS NOTES
0800:  Verbal bedside report given to Tim Bernal RN oncoming nurse by Tim Bernal RN off-going nurse. Report included current pt status and condition, recent results, hx of present illness, heart rate and rhythm, and respiratory status.

## 2022-02-14 NOTE — CONSULTS
Cardiac Electrophysiology Hospital Initial visit Note      Subjective:      Lloyd Thurston is a [de-identified] y.o. patient who is seen for evaluation of CHF and AFIB RVR  Dr Good [place] asked me to see her for biventricular pacemaker and AV node ablation  She has had bradycardia with cardizem IV  She is on now on coreg and cardizem and amiodarone loading PO dose BID  She has chronic renal failure    She was anemic so not on anticoagulation      past medical history remarkable for hypertension, atrial fibrillation, coronary disease status post CABG.  Status post PCI of native LAD in April 2021.  She does have severe three-vessel disease with a patent LIMA to the LAD and a patent graft sequential to the first diagonal obtuse marginal branch.     Also history of diastolic dysfunction and fluctuating ejection fraction.      left bundle branch block       ECHO ADULT FOLLOW-UP OR LIMITED 01/09/2022 1/9/2022     Interpretation Summary    Left Ventricle: Left ventricle size is normal. Mildly increased wall thickness. Mild global hypokinesis present. Mildly reduced left ventricular systolic function with a visually estimated EF of 40 - 45%.   Mitral Valve: Moderate posterior mitral annular calcification.   Pericardium: Left pleural effusion.     Contrast used: Definity.     Signed by: Chelsy Quezada MD on 1/9/2022  2:52 PM     12/2021: LVEF 50-55%, mild AS, mild to moderate MR          Patient Active Problem List   Diagnosis Code    Hypotension I95.9    Coronary artery disease I25.10    S/P coronary artery stent placement Z95.5    Renal failure N19    Elevated troponin R77.8    Pericardial effusion I31.3    Lactic acidosis E87.2    AF (atrial fibrillation) ([facility]) I48.91    Anemia D64.9    GI bleed K92.2    Syncope R55    Bradycardia R00.1    Acute kidney injury ([address] N17.9    Postoperative anemia due to acute blood loss D62    S/P CABG (coronary artery bypass graft) Z95.1    Edema R60.9    Diabetes mellitus ([address] E11.9    CKD (chronic kidney disease), stage III ([facility] [address] N18.30    Fall W19. Jenny Carrington    Acute ischemic stroke ([facility] [address] I63.9    HTN (hypertension) I10    Carotid artery stenosis, symptomatic, left I65.22    Generalized weakness R53.1    Weakness R53.1    UTI (urinary tract infection) N39.0    SOB (shortness of breath) R06.02    Cellulitis and abscess of lower extremity L03.119, L02.419    CAP (community acquired pneumonia) J18.9    Acute on chronic respiratory failure with hypoxia (HCC) J96.21    CHF (congestive heart failure) ([facility]) I50.9    CHF exacerbation ([facility]) I50.9      Current Facility-Administered Medications   Medication Dose Route Frequency    zolpidem (AMBIEN) tablet 5 mg  5 mg Oral QHS PRN    dilTIAZem IR (CARDIZEM) tablet 60 mg  60 mg Oral TIDAC    ARIPiprazole (ABILIFY) tablet 20 mg  20 mg Oral DAILY    atorvastatin (LIPITOR) tablet 40 mg  40 mg Oral QHS    carbidopa-levodopa (SINEMET)  mg per tablet 2 Tablet  2 Tablet Oral QID    carvediloL (COREG) tablet 12.5 mg  12.5 mg Oral BID WITH MEALS    cilostazoL (PLETAL) tablet 100 mg  100 mg Oral ACB&D    cholecalciferol (VITAMIN D3) (1000 Units /25 mcg) tablet 1,000 Units  1,000 Units Oral DAILY    clopidogreL (PLAVIX) tablet 75 mg  75 mg Oral DAILY AFTER BREAKFAST    cyanocobalamin (VITAMIN B12) tablet 100 mcg  100 mcg Oral DAILY    DULoxetine (CYMBALTA) capsule 120 mg  120 mg Oral DAILY    nitroglycerin (NITROSTAT) tablet 0.4 mg  0.4 mg SubLINGual Q5MIN PRN    pantoprazole (PROTONIX) tablet 40 mg  40 mg Oral ACB    [Held by provider] bumetanide (BUMEX) injection 1 mg  1 mg IntraVENous BID    enoxaparin (LOVENOX) injection 30 mg  30 mg SubCUTAneous Q24H    aspirin chewable tablet 81 mg  81 mg Oral DAILY        No Known Allergies       Past Medical History:   Diagnosis Date    Anxiety disorder      Atrial fibrillation (HCC)      CAD (coronary artery disease) 2007     stents, CABG x 3v    Carotid stenosis      Cervical stenosis of spinal canal 2019    Chronic kidney disease      Cough      CVA (cerebral vascular accident) ([facility] [address] 2019     left lacunar infarct at head of caudate    Depression       AND CHRONIC ANXIETY    Diabetes (HCC)      GERD (gastroesophageal reflux disease)      High cholesterol      History of peptic ulcer       Bleeding ulcer with increased NSAID use    Hypertension      Left carotid stenosis 2019     s/p left CEA with Dr. Meño Farr, old 2007    PUD (peptic ulcer disease)      Stroke ([facility] [address]      Tremor      Valvular heart disease              Past Surgical History:   Procedure Laterality Date    COLONOSCOPY N/A 2021     COLONOSCOPY   :- [facility] ENDOSCOPY    HX CAROTID ENDARTERECTOMY   2019    HX CORONARY ARTERY BYPASS GRAFT        HX TONSILLECTOMY   1963    DC CARDIAC SURG PROCEDURE UNLIST         CABG X3 VESSEL    DC TOTAL KNEE ARTHROPLASTY Right 2015            Family History   Problem Relation Age of Onset    Heart Attack Mother 72         Dec 89yo    Hypertension Mother      Other Father           Unknown    Parkinson's Disease Brother      Anesth Problems Neg Hx        Social History            Tobacco Use    Smoking status: Former Smoker       Packs/day: 0.25       Years: 5.00       Pack years: 1.25       Types: Cigarettes       Quit date: 57       Years since quittin.0    Smokeless tobacco: Never Used   Substance Use Topics    Alcohol use: Not Currently       Comment: Rare         Review of Systems:   Constitutional: Negative for fever, chills, weight loss, + malaise/fatigue. HEENT: Negative for nosebleeds, vision changes. Respiratory: Negative for cough, hemoptysis  Cardiovascular: Negative for chest pain, palpitations, orthopnea, claudication, leg swelling, syncope, and PND. Gastrointestinal: Negative for nausea, vomiting, diarrhea, blood in stool and melena.    Genitourinary: Negative for dysuria, and hematuria. Musculoskeletal: Negative for myalgias, arthralgia. Skin: Negative for rash. Heme: Does not bleed or bruise easily. Neurological: Negative for speech change and focal weakness     Objective:     Visit Vitals  /73   Pulse 71   Temp 98 °F (36.7 °C)   Resp 24   Ht 5' 5\" (1.651 m)   Wt 162 lb 4.1 oz (73.6 kg)   SpO2 99%   BMI 27.00 kg/m²            Physical Exam:   Constitutional: well-developed and well-nourished. No respiratory distress. Head: Normocephalic and atraumatic. Eyes: Pupils are equal, round  ENT: hearing normal  Neck: supple. No JVD present. Cardiovascular: normal rate, irregular rhythm. Exam reveals no gallop and no friction rub. No murmur heard. Pulmonary/Chest: Effort normal and breath sounds normal. No wheezes. Abdominal: Soft, no tenderness. Musculoskeletal: trace edema. Neurological: alert, oriented. Skin: Skin is warm and dry  Psychiatric: normal mood and affect. Behavior is normal. Judgment and thought content normal.       EKG: atrial fibrillation, rate         Assessment/Plan:   Atrial fibrillation with RVR: on coreg and cardizem and amiodarone  Chronic renal failure  Anemia  CAD/CABG/stent  Cardiomyopathy with LVEF 40-45%  LBBB  Mild AS  Mild to moderate MR     I stop amiodarone since she is remaining in AFIB and I am concerned about pulmonary toxicity in her  Ventricular rate needs more controlled if she cannot without amiodarone  She has had bradycardia and cardizem IV was stopped  She has CKD so cannot use digoxin  No anticoagulant due to anemia and risk of GI bleeding from previous admission and not a good time for NORRIS closure right now  If rate cannot be controlled after discontinuation of amiodarone, will consider biv pacer and av node ablation   I have discussed with her but she said she needs more time to think about pacer      Thank you for involving me in this patient's care and please call with further concerns or questions.        Syeda Arteaga. Israel Painter M.D.   Electrophysiology/Cardiology  [facility]  [address] [place] [address]                                [phone]

## 2022-02-14 NOTE — PROGRESS NOTES
Transition of Care Plan   RUR: 33%    Disposition: The disposition plan is return to [facility] F/U with PCP/Specialist     Transport: BLS    Reason for Readmission:     CHF         RUR Score/Risk Level:   33%      PCP: First and Last name:  Madeline Platt DO   Name of Practice:    Are you a current patient: Yes/No:    Approximate date of last visit:    Can you participate in a virtual visit with your PCP:     Is a Care Conference indicated:  No      Did you attend your follow up appointment (s): If not, why not: Yes        Resources/supports as identified by patient/family:   Sister and LTC; [facility] facing patient (as identified by patient/family and CM): Finances/Medication cost?     No issues has Medicare and Medicaid  Transportation      [facility]  Support system or lack thereof? Sister and LTC   Living arrangements? LTC  Self-care/ADLs/Cognition? Requires assistance         Current Advanced Directive/Advance Care Plan:             Plan for utilizing home health:   Not recommended              Transition of Care Plan:    Based on readmission, the patient's previous Plan of Care   has been evaluated and/or modified. The current Transition of Care Plan is:           CRM spoke with patient's sister, introduced self, explained role, verified demographics, and offered assistance. The patient lives at [facility] and just began receiving Medicaid for LTC there. Patient requires assistance with her ADL's/IADL's/  Patient's plan is to return to [facility] where she will transition to LTC. Medicare pt has received and reviewed 1st IM letter informing them of their right to appeal the discharge. Care Management Interventions  PCP Verified by CM:  Yes  Palliative Care Criteria Met (RRAT>21 & CHF Dx)?: No  Mode of Transport at Discharge: BLS  Transition of Care Consult (CM Consult): SNF,Long Term Care  MyChart Signup: Yes  Discharge Durable Medical Equipment: No  Health Maintenance Reviewed: Yes  Physical Therapy Consult: No  Occupational Therapy Consult: No  Speech Therapy Consult: No  Support Systems: Other Family Member(s)  Confirm Follow Up Transport: Family  [employer] Information Provided?: No  Discharge Location  [facility]       Readmission Assessment  Number of days since last admission?: 8-30 days  Previous disposition: [address] [facility]  Who is being interviewed?: Patient  What was the patient's/caregiver's perception as to why they think they needed to return back to the hospital?: Other (Comment)  Did you visit your Primary Care Physician after you left the hospital, before you returned this time?: No  Why weren't you able to visit your PCP?: Did not have an appointment  Did you see a specialist, such as Cardiac, Pulmonary, Orthopedic Physician, etc. after you left the hospital?: Yes  Who advised the patient to return to the hospital?: Skilled Unit  Does the patient report anything that got in the way of taking their medications?: No  In our efforts to provide the best possible care to you and others like you, can you think of anything that we could have done to help you after you left the hospital the first time, so that you might not have needed to return so soon?: Other (Comment)        Roxann Russell, CRM

## 2022-02-15 LAB
ANION GAP SERPL CALC-SCNC: 5 MMOL/L (ref 5–15)
BASOPHILS # BLD: 0 K/UL (ref 0–0.1)
BASOPHILS NFR BLD: 1 % (ref 0–1)
BUN SERPL-MCNC: 31 MG/DL (ref 6–20)
BUN/CREAT SERPL: 18 (ref 12–20)
CALCIUM SERPL-MCNC: 9.3 MG/DL (ref 8.5–10.1)
CHLORIDE SERPL-SCNC: 103 MMOL/L (ref 97–108)
CO2 SERPL-SCNC: 26 MMOL/L (ref 21–32)
CREAT SERPL-MCNC: 1.72 MG/DL (ref 0.55–1.02)
DIFFERENTIAL METHOD BLD: ABNORMAL
EOSINOPHIL # BLD: 0.2 K/UL (ref 0–0.4)
EOSINOPHIL NFR BLD: 5 % (ref 0–7)
ERYTHROCYTE [DISTWIDTH] IN BLOOD BY AUTOMATED COUNT: 13.3 % (ref 11.5–14.5)
GLUCOSE SERPL-MCNC: 143 MG/DL (ref 65–100)
HCT VFR BLD AUTO: 25.5 % (ref 35–47)
HGB BLD-MCNC: 8.6 G/DL (ref 11.5–16)
IMM GRANULOCYTES # BLD AUTO: 0 K/UL (ref 0–0.04)
IMM GRANULOCYTES NFR BLD AUTO: 0 % (ref 0–0.5)
LYMPHOCYTES # BLD: 0.7 K/UL (ref 0.8–3.5)
LYMPHOCYTES NFR BLD: 17 % (ref 12–49)
MAGNESIUM SERPL-MCNC: 1.9 MG/DL (ref 1.6–2.4)
MCH RBC QN AUTO: 32 PG (ref 26–34)
MCHC RBC AUTO-ENTMCNC: 33.7 G/DL (ref 30–36.5)
MCV RBC AUTO: 94.8 FL (ref 80–99)
MONOCYTES # BLD: 0.4 K/UL (ref 0–1)
MONOCYTES NFR BLD: 9 % (ref 5–13)
NEUTS SEG # BLD: 3.1 K/UL (ref 1.8–8)
NEUTS SEG NFR BLD: 68 % (ref 32–75)
NRBC # BLD: 0 K/UL (ref 0–0.01)
NRBC BLD-RTO: 0 PER 100 WBC
PHOSPHATE SERPL-MCNC: 3.8 MG/DL (ref 2.6–4.7)
PLATELET # BLD AUTO: 186 K/UL (ref 150–400)
PMV BLD AUTO: 9.3 FL (ref 8.9–12.9)
POTASSIUM SERPL-SCNC: 4.1 MMOL/L (ref 3.5–5.1)
RBC # BLD AUTO: 2.69 M/UL (ref 3.8–5.2)
RBC MORPH BLD: ABNORMAL
SODIUM SERPL-SCNC: 134 MMOL/L (ref 136–145)
WBC # BLD AUTO: 4.4 K/UL (ref 3.6–11)

## 2022-02-15 PROCEDURE — 74011250637 HC RX REV CODE- 250/637: Performed by: NURSE PRACTITIONER

## 2022-02-15 PROCEDURE — 36415 COLL VENOUS BLD VENIPUNCTURE: CPT

## 2022-02-15 PROCEDURE — 85025 COMPLETE CBC W/AUTO DIFF WBC: CPT

## 2022-02-15 PROCEDURE — 97166 OT EVAL MOD COMPLEX 45 MIN: CPT

## 2022-02-15 PROCEDURE — 74011250637 HC RX REV CODE- 250/637: Performed by: INTERNAL MEDICINE

## 2022-02-15 PROCEDURE — 97535 SELF CARE MNGMENT TRAINING: CPT

## 2022-02-15 PROCEDURE — 65660000001 HC RM ICU INTERMED STEPDOWN

## 2022-02-15 PROCEDURE — 74011250637 HC RX REV CODE- 250/637: Performed by: HOSPITALIST

## 2022-02-15 PROCEDURE — 99233 SBSQ HOSP IP/OBS HIGH 50: CPT | Performed by: SPECIALIST

## 2022-02-15 PROCEDURE — 83735 ASSAY OF MAGNESIUM: CPT

## 2022-02-15 PROCEDURE — 80048 BASIC METABOLIC PNL TOTAL CA: CPT

## 2022-02-15 PROCEDURE — 84100 ASSAY OF PHOSPHORUS: CPT

## 2022-02-15 RX ADMIN — CARVEDILOL 12.5 MG: 12.5 TABLET, FILM COATED ORAL at 08:34

## 2022-02-15 RX ADMIN — PANTOPRAZOLE SODIUM 40 MG: 40 TABLET, DELAYED RELEASE ORAL at 06:40

## 2022-02-15 RX ADMIN — ATORVASTATIN CALCIUM 40 MG: 40 TABLET, FILM COATED ORAL at 21:14

## 2022-02-15 RX ADMIN — VITAM B12 100 MCG: 100 TAB at 08:34

## 2022-02-15 RX ADMIN — DILTIAZEM HYDROCHLORIDE 60 MG: 30 TABLET, FILM COATED ORAL at 06:40

## 2022-02-15 RX ADMIN — CILOSTAZOL 100 MG: 50 TABLET ORAL at 06:40

## 2022-02-15 RX ADMIN — CARVEDILOL 12.5 MG: 12.5 TABLET, FILM COATED ORAL at 16:20

## 2022-02-15 RX ADMIN — CARBIDOPA AND LEVODOPA 2 TABLET: 25; 100 TABLET ORAL at 13:01

## 2022-02-15 RX ADMIN — CARBIDOPA AND LEVODOPA 2 TABLET: 25; 100 TABLET ORAL at 08:34

## 2022-02-15 RX ADMIN — DILTIAZEM HYDROCHLORIDE 60 MG: 30 TABLET, FILM COATED ORAL at 11:26

## 2022-02-15 RX ADMIN — CARBIDOPA AND LEVODOPA 2 TABLET: 25; 100 TABLET ORAL at 21:13

## 2022-02-15 RX ADMIN — CLOPIDOGREL BISULFATE 75 MG: 75 TABLET ORAL at 08:34

## 2022-02-15 RX ADMIN — APIXABAN 2.5 MG: 2.5 TABLET, FILM COATED ORAL at 18:06

## 2022-02-15 RX ADMIN — DULOXETINE HYDROCHLORIDE 120 MG: 60 CAPSULE, DELAYED RELEASE ORAL at 08:34

## 2022-02-15 RX ADMIN — DILTIAZEM HYDROCHLORIDE 60 MG: 30 TABLET, FILM COATED ORAL at 16:20

## 2022-02-15 RX ADMIN — ASPIRIN 81 MG CHEWABLE TABLET 81 MG: 81 TABLET CHEWABLE at 08:34

## 2022-02-15 RX ADMIN — CILOSTAZOL 100 MG: 50 TABLET ORAL at 16:20

## 2022-02-15 RX ADMIN — ARIPIPRAZOLE 20 MG: 5 TABLET ORAL at 08:34

## 2022-02-15 RX ADMIN — Medication 1000 UNITS: at 08:34

## 2022-02-15 RX ADMIN — CARBIDOPA AND LEVODOPA 2 TABLET: 25; 100 TABLET ORAL at 18:05

## 2022-02-15 NOTE — PROGRESS NOTES
0800:  Verbal bedside report given to LAMONT Mcgovern oncoming nurse by Jose Martin Davalos RN off-going nurse. Report included current pt status and condition, recent results, hx of present illness, heart rate and rhythm, and respiratory status.

## 2022-02-15 NOTE — NURSE NAVIGATOR
Chart reviewed by Heart Failure Nurse Navigator. Heart Failure database completed. EF:  37% Nuclear stress 1/11/22    ACEi/ARB/ARNi: contraindicated renal dysfunction    BB: Coreg    Aldosterone Antagonist: **    Obstructive Sleep Apnea Screening: N/4 age   STOP-BANG score:   Referred to Sleep Medicine:     CRT not indicated      NYHA Functional Class IV. Heart Failure Teach Back in Patient Education. Heart Failure Avoiding Triggers on Discharge Instructions. Cardiologist: JO-ANN      Post discharge follow up phone call to be made within 48-72 hours of discharge.       Heart Failure Readmission:    12/30/21 - 1/6/22 1/7/22 - 1/14/22  1/15/22 - 1/27/22  Readmit 2/11/22

## 2022-02-15 NOTE — PROGRESS NOTES
D/w Dr. Cony Velasquez- retrial eliquis. Started low dose eliquis (dose reduced due to age, renal function). Stopped plavix and continue ASA.

## 2022-02-15 NOTE — PROGRESS NOTES
[facility]                                                                                          Hospitalist Progress Note  Brad Arnold MD  Answering service: [phone] or [phone] from in house phone        Date of Service:  2/15/2022  NAME:  Luis M Shannon  :  1940  MRN:  [id number]      Admission Summary:   Patient is an 42-year-old female with a complex medical history including afib, chf, cad recent covid infection , CVA, PUD and DM, pt from [facility],  presenting to ED with chest pain, hypoxia and shortness of breath. Interval history / Subjective:   Discussed plan for potential PPM and AV node ablation. Pt said she will continue to think about this. Otherwise denies palpitations, rate improved today. Restarted eliquis per cardiology      Assessment & Plan:     Afib with RVR   - s/p diltiazem gtt, now off due to bradycardia   - Continue PO diltiazem to 60 TID  - Continue coreg 12.5mg   - Cardiology following  - Monitor on telemetry   - Off AC due to h/o GIB --> to resume eliquis today   - May need watchman device o/p   - Agree with AV note ablation and BiV PPM given patient's difficult to control Afib, as well as multiple readmissions for this issue. - If no sign of bleeding, should be hopefully stable for DC tomorrow. Discussed with CM     Chest pain - denies CP today, per report complained on admit. Suspect 2/2 Afib   CAD s/p CABG   Type 2 MI - demand from Afib with RVR  - troponin flat  - Cards following  - normal perfusion scan   - Continue ASA, Pletal, statin     Acute on chronic systolic CHF - NYHA class IV on admit, last EF 22 40-45%  Acute hypoxic respiratory failure - resolved. - Continue GDMT --> coreg, ASA, statin  - Hold bumex due to elevated creatinine. Transition to PO bumex when Cr improves.    - Wean O2 as tolerated for O2 sat 90%  - I and O and daily weights  - Cardiology following     MORENO on CKD - Cr up to 1.7 from baseline 1.3  - Likely secondary to overdiuresis  - Holding bumex, and monitor  - Avoid nephrotoxins, and renally dose meds. Anemia - chronic  - monitor and transfuse for hemoglobin less than 7   - previously recommended for bone marrow biopsy by Dr. Fredo Saenz. - No urgency for biopsy at this time, hemoglobin has been stable     H/o CVA   H/o L carotid stenosis s/p CEA - ASA, plavix, statin   Parkinsons disease - on sinemet   Anxiety/bipolar? - abilify, duloxetine      Code status: DDNR on file  Prophylaxis: lovenox   Care Plan discussed with: pt, RN   Anticipated Disposition: Back to SNF in 24-48 hours. PT/OT consults     Hospital Problems  Date Reviewed: 1/17/2022          Codes Class Noted POA    Rapid atrial fibrillation [facility]) ICD-10-CM: I48.91  ICD-9-CM: 427.31  2/11/2022 Unknown        CHF exacerbation ([facility] [address] ICD-10-CM: I50.9  ICD-9-CM: 428.0  1/15/2022 Unknown                Review of Systems:   A comprehensive review of systems was negative except for that written in the HPI. Vital Signs:    Last 24hrs VS reviewed since prior progress note. Most recent are:  Visit Vitals  BP (!) 113/51 (BP 1 Location: Left upper arm, BP Patient Position: At rest;Sitting)   Pulse 63   Temp 98.3 °F (36.8 °C)   Resp 24   Ht 5' 5\" (1.651 m)   Wt 75.6 kg (166 lb 10.7 oz)   SpO2 96%   BMI 27.73 kg/m²         Intake/Output Summary (Last 24 hours) at 2/15/2022 1544  Last data filed at 2/15/2022 1200  Gross per 24 hour   Intake 440 ml   Output 400 ml   Net 40 ml        Physical Examination:     I had a face to face encounter with this patient and independently examined them on 2/15/2022 as outlined below:          Constitutional:  No acute distress, cooperative, pleasant    ENT:  Oral mucosa moist, oropharynx benign. Resp:  Exp wheezing, no increased work of breathing. No accessory muscle use.  Able to speak in full sentences, saturating 98% on RA   CV:  Irregularly irregular, rate in 60's, no murmurs, gallops, rubs    GI:  Soft, non distended, non tender. normoactive bowel sounds, no hepatosplenomegaly     Musculoskeletal:  1+ edema, warm, 2+ pulses throughout    Neurologic:  Moves all extremities. AAOx3, CN II-XII reviewed            Data Review:    Review and/or order of clinical lab test  Review and/or order of tests in the radiology section of [facility]  Review and/or order of tests in the medicine section of [facility]      Labs:     Recent Labs     02/15/22  0503 02/14/22  0104   WBC 4.4 5.4   HGB 8.6* 8.8*   HCT 25.5* 26.8*    159     Recent Labs     02/15/22  0503 02/14/22  0104 02/13/22  0332   * 133* 135*   K 4.1 4.3 3.8    103 105   CO2 26 25 25   BUN 31* 29* 28*   CREA 1.72* 1.61* 1.37*   * 177* 190*   CA 9.3 8.9 8.3*   MG 1.9 1.7 1.6   PHOS 3.8 3.7 3.1     No results for input(s): ALT, AP, TBIL, TBILI, TP, ALB, GLOB, GGT, AML, LPSE in the last 72 hours. No lab exists for component: SGOT, GPT, AMYP, HLPSE  No results for input(s): INR, PTP, APTT, INREXT, INREXT in the last 72 hours. No results for input(s): FE, TIBC, PSAT, FERR in the last 72 hours. Lab Results   Component Value Date/Time    Folate 7.8 01/25/2022 05:07 AM      No results for input(s): PH, PCO2, PO2 in the last 72 hours. No results for input(s): CPK, CKNDX, TROIQ in the last 72 hours.     No lab exists for component: CPKMB  Lab Results   Component Value Date/Time    Cholesterol, total 148 09/23/2020 04:27 AM    HDL Cholesterol 52 09/23/2020 04:27 AM    LDL, calculated 83.8 09/23/2020 04:27 AM    Triglyceride 61 09/23/2020 04:27 AM    CHOL/HDL Ratio 2.8 09/23/2020 04:27 AM     Lab Results   Component Value Date/Time    Glucose (POC) 206 (H) 01/27/2022 11:03 AM    Glucose (POC) 177 (H) 01/27/2022 06:39 AM    Glucose (POC) 189 (H) 01/26/2022 10:36 PM    Glucose (POC) 270 (H) 01/26/2022 04:38 PM    Glucose (POC) 230 (H) 01/26/2022 11:21 AM     Lab Results   Component Value Date/Time    Color YELLOW/STRAW 12/30/2021 08:23 PM    Appearance CLOUDY (A) 12/30/2021 08:23 PM    Specific gravity 1.017 12/30/2021 08:23 PM    pH (UA) 6.0 12/30/2021 08:23 PM    Protein 100 (A) 12/30/2021 08:23 PM    Glucose Negative 12/30/2021 08:23 PM    Ketone Negative 12/30/2021 08:23 PM    Bilirubin Negative 12/30/2021 08:23 PM    Urobilinogen 1.0 12/30/2021 08:23 PM    Nitrites Negative 12/30/2021 08:23 PM    Leukocyte Esterase MODERATE (A) 12/30/2021 08:23 PM    Epithelial cells FEW 12/30/2021 08:23 PM    Bacteria 1+ (A) 12/30/2021 08:23 PM    WBC 0-4 12/30/2021 08:23 PM    RBC 0-5 12/30/2021 08:23 PM         Medications Reviewed:     Current Facility-Administered Medications   Medication Dose Route Frequency    apixaban (ELIQUIS) tablet 2.5 mg  2.5 mg Oral BID    zolpidem (AMBIEN) tablet 5 mg  5 mg Oral QHS PRN    dilTIAZem IR (CARDIZEM) tablet 60 mg  60 mg Oral TIDAC    ARIPiprazole (ABILIFY) tablet 20 mg  20 mg Oral DAILY    atorvastatin (LIPITOR) tablet 40 mg  40 mg Oral QHS    carbidopa-levodopa (SINEMET)  mg per tablet 2 Tablet  2 Tablet Oral QID    carvediloL (COREG) tablet 12.5 mg  12.5 mg Oral BID WITH MEALS    cilostazoL (PLETAL) tablet 100 mg  100 mg Oral ACB&D    cholecalciferol (VITAMIN D3) (1000 Units /25 mcg) tablet 1,000 Units  1,000 Units Oral DAILY    cyanocobalamin (VITAMIN B12) tablet 100 mcg  100 mcg Oral DAILY    DULoxetine (CYMBALTA) capsule 120 mg  120 mg Oral DAILY    nitroglycerin (NITROSTAT) tablet 0.4 mg  0.4 mg SubLINGual Q5MIN PRN    pantoprazole (PROTONIX) tablet 40 mg  40 mg Oral ACB    [Held by provider] bumetanide (BUMEX) injection 1 mg  1 mg IntraVENous BID    aspirin chewable tablet 81 mg  81 mg Oral DAILY     ______________________________________________________________________  EXPECTED LENGTH OF STAY: - - -  ACTUAL LENGTH OF STAY:          4                 Alma Delai Owen MD

## 2022-02-15 NOTE — PROGRESS NOTES
[facility]  Cardiology Care Note                      Patient Name: Norma Gonzalez - Lisa Echevarria - DXO:[id number]  Primary Cardiologist: Castro Guthrie MD        Interval HPI/Subjective:  Ms. Dave Baeza is an [de-identified] y.o. female with extensive PMH (see below) which includes afib, CAD, CABG, stents, chronic LBBB,  who presented to ER on 2/11/22 with chief c/o chest pain and rapid heart rate- afib with RVR and CHF exacerbation. She had bradycardia with IV cardizem. Now on po cardizem and coreg. Amiodarone was stopped yesterday by Dr. Wilver Rahman due to concern for risk of pulmonary toxicity. Today, she denies chest pain, SOB, palpitations. She reports that she is not ready for ppm/AVN at this time. (Dr. Wilver Rahman recommend BiV PPM/AVN if rate note controlled off amiodarone). She c/o insomnia which she says is chronic, requesting sleep medicine at night. Assessment and Plan     1. Afib with intermittent RVR   -HR overall <110, mostly 80's-90's   -Diltiazem IR 60 mg TID   -Coreg 12.5 mg BID   -Amiodarone stopped (concern for pulmonary toxicity risk). No digoxin (CKD)   -Appreciate Dr. Wilver Rahmna assist.-BiV/PPM, AVN ablation if rate not controlled off amiodarone. Pt declines PPM/AVN at this time. -[id number]=4. Has been off GuardiCore due to anemia. Some consideration of Watchman in past but would still need to be on GuardiCore for at least 45 days post procedure. Not good candidate currently. Can re-evaluate as outpatient. 2. Acute on chronic systolic CHF   -NYHA III EF 40-45% (echo 1/9/22)   -Bumex on hold- creatinine trending up   -Continue Coreg. No ACE-I/ARB due to renal dysfunction     3. Elevated troponin   -Trops -218-405   -No chest pain now.    -Lexiscan stress test 1/11/22: no significant perfusion defect or ischemia.   -Troponin elevation due to type II MI (supply demand mismatch due to afib with RVR)    4.  CAD:   -Hx of CABG, 4/2021: PCI/STEPHAN native LAD , patent QUINTANA to LAD, VG to D1 to OM. -Continue ASA, statin, plavix, BB  5. HTN   -BP labile. Continue coreg. 6. Elevated creatinine/hx of CKD   -creatinine trending back up   -Bumex on hold. 7. CKD:    -creatinine trending back up.   -Diuretic on hold. 8. Anemia, chronic   -Hgb stable 8.6   -denies any blood in stool. Last GI workup 12/2021 unrevealing    -Hematology consult pending. (previously seen by Hematology-recommended BM biopsy)    9. Left carotid stenosis s/p CEA: asa, statin, plavix    HR overall <110, now off amiodarone. Continue BB, po diltiazem. Further plan per Dr. Shree Guzman. Patient seen on the day of progress note and examined  and agree with Advance Practice Provider (TABBY, NP,PA)  assessment and plans. Patient declines ppm av erlin ablation at this time     HR controlled for now    In my opinion watchman procedure should be considered  There has been no clear evidence for recent gi bleed and gi eval 12/21 egd ok and polypectomy by colonoscopy    We will consider re challenging her with eliquis    For now on aspirin and plavix (pci on 4/21)    Once started eliquis stop plavix and  Continue aspirin alone    She will need BM biopsy    03/29/21    INVASIVE VASCULAR PROCEDURE 04/05/2021 4/5/2021  CARDIAC PROCEDURE 04/05/2021 4/5/2021    Conclusion  Results: After angiography 60 to 70% stenosis of ostium of right renal artery was noted. On IVUS imaging the minimal luminal dimensions were 4 x 2.1 mm with MLA of 8 mm². Left renal angiogram demonstrated 50 to 60% angiographic stenosis which on IVUS appeared to be even less prominent with minimal luminal dimensions of 4.2 x 3.7 mm. Conclusion: Moderate to severe left renal artery stenosis and mild to moderate right renal arterial stenosis. Both stenotic lesions are ostial and atherosclerotic in etiology.     Signed by: Tony Ray MD on 4/5/2021 10:00 AM        Li Contreras MD      Cardiac testing:  Nuclear stress test 1/11/2021: no ischemia or infarction  [facility] 4/1/22: 1)Severe antive 3 vessel CAD  2)Patetn LIMA to LAD, VG to d1 to OM  3. Occluded native RCA with rPDA collateralized from left system. All arteries are heavily calcified  4. Native distal LAD with 90% stenosis  5. S/p PCI of native distal LAS with 2.25 by 18 mm Xinece STEPHAN through LIMA graft  6. Normal LVEDP  Echo 1/9/22: EF 40-45%,  Eco: 12/31/22: EF 55-60%, mild- mod MR  Echo: 8/26/21: EF 50-55%, mild-mod MR  Echo 4/26/21: EF 25-40%  Echo 4/3/2021: EF 30-35%  Echo 3/31/21: EF 25-30%    Other prior testing.   -2007 PCI x2 to LAD with STEPHAN  -cath 2/11/15 100% distal RCA, PDA fills from left. LAD ostial 70%, mid 99% within prior stents, distal 90%. Ostial LCX 90%, OM 1 80%. LV normal.  Surgical disease, she does not want to consider CABG.     ____________________________________________________________    HPI:  Ms. Matthew Masterson has PMH of HTN, atrial fibrillation, fluctuating LV function, CAD status post CABG and Status post PCI of native LAD in April 2021.  She does have severe three-vessel disease with a patent LIMA to the LAD and a patent graft sequential to the first diagonal obtuse marginal branch. Additional history includes LBBB,, diastolic dysfunction and fluctuating LV function, CVA, PAD with L CEA, AAA, renal artery stenosis, anemia, and Parkinsons, gastric ulcers with GIB in 2014 (normal mucosa by EGD 12/17/21), covid 19 last month. . Last hospitalized last month for CHF exacerbation and afib with RVR. Presented 2/13/21 with chief c/o chest pain, afib with RVR.    Patient Active Problem List   Diagnosis Code    Hypotension I95.9    Coronary artery disease I25.10    S/P coronary artery stent placement Z95.5    Renal failure N19    Elevated troponin R77.8    Pericardial effusion I31.3    Lactic acidosis E87.2    AF (atrial fibrillation) ([facility]) I48.91    Anemia D64.9    GI bleed K92.2    Syncope R55    Bradycardia R00.1    [facility] kidney [facility] ([facility] N17.9    Postoperative anemia due to acute blood loss D62    S/P CABG (coronary artery bypass graft) Z95.1    Edema R60.9    Diabetes mellitus ([facility]) E11.9    CKD (chronic kidney disease), stage III ([facility]) N18.30    Fall W19. Olevia Screen    Acute ischemic stroke ([facility] I63.9    HTN (hypertension) I10    Carotid artery stenosis, symptomatic, left I65.22    Generalized weakness R53.1    Weakness R53.1    UTI (urinary tract infection) N39.0    SOB (shortness of breath) R06.02    Cellulitis and abscess of lower extremity L03.119, L02.419    CAP (community acquired pneumonia) J18.9    Acute on chronic respiratory failure with hypoxia ([facility]) J96.21    CHF (congestive heart failure) ([facility]) I50.9    CHF exacerbation ([facility]) I50.9    Pulmonary edema J81.1    Atrial fibrillation with RVR ([facility]) I48.91    Acute respiratory failure with hypoxia ([facility]) J96.01    Acute on chronic systolic and diastolic heart failure, NYHA class 4 ([facility]) I50.43    Left bundle branch block I44.7    Rapid atrial fibrillation ([facility]) I48.91       Review of Systems:    [] Patient unable to provide secondary to condition    CONSTITUTIONAL: negative    ENT/MOUTH: negative  EYES: negative  CV: no chest pain   Respiratory: no SOB    GASTROINTESTINAL: negative   GENITOURINARY: negative   MUSCULOSKELETAL: negative  SKIN: negative  NEUROLOGICAL: negative   PSYCHOLOGICAL: negative    ENDOCRINE: negative        Past Medical History:   Diagnosis Date    Anxiety disorder     Atrial fibrillation ([facility])     CAD (coronary artery disease) 2007    stents, CABG x 3v    Carotid stenosis     Cervical stenosis of spinal canal 07/2019    Chronic kidney disease     Cough     CVA (cerebral vascular accident) ([facility] 07/2019    left lacunar infarct at head of caudate    Depression     AND CHRONIC ANXIETY    Diabetes ([facility])     GERD (gastroesophageal reflux disease)     High cholesterol     History of peptic ulcer     Bleeding ulcer with increased NSAID use    Hypertension     Left bundle branch block 01/01/2022    Left carotid stenosis 07/2019    s/p left CEA with Dr. Michele Sherwood, old 2007    PUD (peptic ulcer disease)     Stroke ([facility] [address]     Tremor     Valvular heart disease      Past Surgical History:   Procedure Laterality Date    COLONOSCOPY N/A 12/17/2021    COLONOSCOPY   :- [facility] ENDOSCOPY    HX CAROTID ENDARTERECTOMY  07/2019    HX CORONARY ARTERY BYPASS GRAFT      HX TONSILLECTOMY  1963    MO CARDIAC SURG PROCEDURE UNLIST      CABG X3 VESSEL    MO TOTAL KNEE ARTHROPLASTY Right 2015     Current Facility-Administered Medications   Medication Dose Route Frequency    zolpidem (AMBIEN) tablet 5 mg  5 mg Oral QHS PRN    dilTIAZem IR (CARDIZEM) tablet 60 mg  60 mg Oral TIDAC    ARIPiprazole (ABILIFY) tablet 20 mg  20 mg Oral DAILY    atorvastatin (LIPITOR) tablet 40 mg  40 mg Oral QHS    carbidopa-levodopa (SINEMET)  mg per tablet 2 Tablet  2 Tablet Oral QID    carvediloL (COREG) tablet 12.5 mg  12.5 mg Oral BID WITH MEALS    cilostazoL (PLETAL) tablet 100 mg  100 mg Oral ACB&D    cholecalciferol (VITAMIN D3) (1000 Units /25 mcg) tablet 1,000 Units  1,000 Units Oral DAILY    clopidogreL (PLAVIX) tablet 75 mg  75 mg Oral DAILY AFTER BREAKFAST    cyanocobalamin (VITAMIN B12) tablet 100 mcg  100 mcg Oral DAILY    DULoxetine (CYMBALTA) capsule 120 mg  120 mg Oral DAILY    nitroglycerin (NITROSTAT) tablet 0.4 mg  0.4 mg SubLINGual Q5MIN PRN    pantoprazole (PROTONIX) tablet 40 mg  40 mg Oral ACB    [Held by provider] bumetanide (BUMEX) injection 1 mg  1 mg IntraVENous BID    enoxaparin (LOVENOX) injection 30 mg  30 mg SubCUTAneous Q24H    aspirin chewable tablet 81 mg  81 mg Oral DAILY       No Known Allergies     FmHx: family history includes Heart Attack (age of onset: 72) in her mother; Hypertension in her mother; Other in her father; Parkinson's Disease in her brother. Social Hx :  reports that she quit smoking about 53 years ago. Her smoking use included cigarettes. She has a 1.25 pack-year smoking history. She has never used smokeless tobacco. She reports previous alcohol use. She reports that she does not use drugs. Objective:    Physical Exam    Vitals:   Vitals:    02/15/22 0836 02/15/22 1000 02/15/22 1114 02/15/22 1200   BP:   110/75 104/62   Pulse: 85 78 82 72   Resp: 18  15 16   Temp:   98.5 °F (36.9 °C)    SpO2: 94%  93% 94%   Weight:       Height:           General:    Alert, cooperative, no distress, appears stated age. Neck:   Supple,    Back:     Symmetric,    Lungs:     Clear to auscultation anteriorly No use of accessory muscles. Heart[de-identified]    Irregularly irregular rate and rhythm. Grade II/VI systolic murmur at apex. Abdomen:     Soft, non-tender. Bowel sounds normal.    Extremities:   LLE tr-1+ edema, RLE no edema. Vascular skin changes noted. Vascular:   Pulses - 2+   Skin:   Skin color normal. No rashes on visible areas   Neurologic:   Alert, oriented x3.         Telemetry: afib rate 80's-low 100 (<110)    ECG:   EKG Results     Procedure 720 Value Units Date/Time    EKG, 12 LEAD, INITIAL [[id number]] Collected: 02/11/22 1111    Order Status: Completed Updated: 02/11/22 1711     Ventricular Rate 106 BPM      QRS Duration 130 ms      Q-T Interval 378 ms      QTC Calculation (Bezet) 502 ms      Calculated R Axis -36 degrees      Calculated T Axis 173 degrees      Diagnosis --     Atrial fibrillation with rapid ventricular response  Left bundle branch block  When compared with ECG of 11-FEB-2022 09:14,  No significant change  Confirmed by Ulysess Lights ([id number]) on 2/11/2022 5:10:48 PM            Data Review:     Radiology:   XR Results (most recent):  Results from Hospital Encounter encounter on 02/11/22    XR CHEST PORT    Narrative  Indication: Hypoxia    Comparison: 1/20/2022    Portable exam of the chest obtained at 944 demonstrates stable cardiomegaly. The  patient is status post median sternotomy. Bilateral pulmonary infiltrates are  noted most likely related to pulmonary edema. Trace bilateral pleural effusions. Impression  Pulmonary edema and trace bilateral pleural effusions. No results for input(s): CPK, TROIQ in the last 72 hours. No lab exists for component: CKQMB, CPKMB, BMPP  Recent Labs     02/15/22  0503 02/14/22  0104   * 133*   K 4.1 4.3    103   CO2 26 25   BUN 31* 29*   CREA 1.72* 1.61*   * 177*   PHOS 3.8 3.7   CA 9.3 8.9     Recent Labs     02/15/22  0503 02/14/22  0104   WBC 4.4 5.4   HGB 8.6* 8.8*   HCT 25.5* 26.8*    159     No results for input(s): PTP, INR, AP, INREXT, INREXT in the last 72 hours. No lab exists for component: PTTP, GPT, SGOT  No results for input(s): CHOL, LDLC in the last 72 hours. No lab exists for component: TGL, HDLC,  HBA1C  No results for input(s): CRP, TSH, TSHEXT, TSHEXT in the last 72 hours.     No lab exists for component: ESR    Barbara Franco MD    [facility] [address] [place] [address], [address] [place] [address],[address]  [facility]  [phone], [place], [place]

## 2022-02-15 NOTE — PROGRESS NOTES
Verbal bedside report given to Leyda Medina RN oncoming nurse by Onur Sandoval. Lulu Greco, LAMONT off-going nurse. Report included current pt status and condition, recent results, hx of present illness, heart rate and rhythm, and respiratory status. 0830  Pt awake and alert, appears and feels well.

## 2022-02-15 NOTE — PROGRESS NOTES
Verbal shift change report given to Xenia Martin RN (oncoming nurse) by Xenia Martin RN (offgoing nurse). Report included the following information SBAR, Kardex, Intake/Output, MAR, Recent Results and Cardiac Rhythm Afib.

## 2022-02-15 NOTE — PROGRESS NOTES
Problem: Self Care Deficits Care Plan (Adult)  Goal: *Acute Goals and Plan of Care (Insert Text)  Description: FUNCTIONAL STATUS PRIOR TO ADMISSION: Prior to recent admissions (multiple in Jan. 2022), pt was functional at the w/c level and modified independent with functional transfers to and from the wheelchair. She was discharged to [facility] to receive therapy services with believed plan to transition back to LTC, COVID+ Jan 2022 with transition to SNF then LTC. HOME SUPPORT: The patient lived at [facility], however following last admission pt resided in SNF to receive therapy services and required assist from staff for mobility/ADL/IADL tasks. Occupational Therapy Goals  Initiated 2/15/2022  1. Patient will perform self-feeding with supervision/set-up within 7 day(s). 2.  Patient will perform grooming EOB with supervision/set-up within 7 day(s). 3.  Patient will perform upper body dressing with supervision/set-up within 7 day(s). 4.  Patient will perform toilet transfers with moderate assistance  within 7 day(s). 5.  Patient will perform all aspects of toileting with maximal assistance within 7 day(s). 6.  Patient will participate in upper extremity therapeutic exercise/activities with supervision/set-up for 5 minutes within 7 day(s). 7.  Patient will utilize energy conservation techniques during functional activities with verbal cues within 7 day(s). Outcome: Progressing Towards Goal   OCCUPATIONAL THERAPY EVALUATION  Patient: Jeevan Samayoa (72 y.o. female)  Date: 2/15/2022  Primary Diagnosis: CHF exacerbation ([facility] [I50.9]  Rapid atrial fibrillation ([address] [I48.91]        Precautions:   Fall,Contact    ASSESSMENT  Based on the objective data described below, the patient presents with fair activity tolerance, mild but pleasant confusion, impaired strength, balance and endurance s/p admission with CHF exacerbation with chest pain, SOB and hypoxia.  Currently in AFib (70s-90s) with SPO2 on room air and BP stable, tolerated long sitting in bed for UB ADLs and core exercises with pulling self forward 3x with overall min A. Requiring MAX cues for participation with motivation of lunch tray. At this time recommend transition back to SNF to LTC as tolerated to maintain PLOF. Current Level of Function Impacting Discharge (ADLs/self-care): up to total A for LB ADLs and toileting, max A for rolling, min A to setup for UB ADls    Functional Outcome Measure: The patient scored Total: 10/100 on the Barthel Index outcome measure which is indicative of being below baseline in basic self-care. Other factors to consider for discharge: possible PPM/AVN, CVA, DM, Parkinson's disease, AFib, CAD/CABG, stents, CHF III, COVID+ Jan 2022     Patient will benefit from skilled therapy intervention to address the above noted impairments. PLAN :  Recommendations and Planned Interventions: self care training, functional mobility training, therapeutic exercise, balance training, visual/perceptual training, therapeutic activities, cognitive retraining, endurance activities, neuromuscular re-education, and patient education    Frequency/Duration: Patient will be followed by occupational therapy 3 times a week to address goals.     Recommendation for discharge: (in order for the patient to meet his/her long term goals)  Therapy up to 5 days/week in SNF setting    This discharge recommendation:  Has not yet been discussed the attending provider and/or case management    IF patient discharges home will need the following DME: patient owns DME required for discharge       SUBJECTIVE:   Patient stated I'm just hear waiting on my breakfast. re: lunch time/re: in hospital due to \"health\" but unable to elaborate    OBJECTIVE DATA SUMMARY:   HISTORY:   Past Medical History:   Diagnosis Date    Anxiety disorder     Atrial fibrillation ([address]     CAD (coronary artery disease) 2007    stents, CABG x 3v    Carotid stenosis     Cervical stenosis of spinal canal 07/2019    Chronic kidney disease     Cough     CVA (cerebral vascular accident) ([facility] 07/2019    left lacunar infarct at head of caudate    Depression     AND CHRONIC ANXIETY    Diabetes (HCC)     GERD (gastroesophageal reflux disease)     High cholesterol     History of peptic ulcer     Bleeding ulcer with increased NSAID use    Hypertension     Left bundle branch block 01/01/2022    Left carotid stenosis 07/2019    s/p left CEA with Dr. Cinthya Smith, old 2007    PUD (peptic ulcer disease)     Stroke ([facility]     Tremor     Valvular heart disease      Past Surgical History:   Procedure Laterality Date    COLONOSCOPY N/A 12/17/2021    COLONOSCOPY   :- performed by Misha Hoffman MD at [address] HX CAROTID ENDARTERECTOMY  07/2019    HX CORONARY ARTERY BYPASS GRAFT      HX TONSILLECTOMY  1963    TX CARDIAC SURG PROCEDURE UNLIST      CABG X3 VESSEL    TX TOTAL KNEE ARTHROPLASTY Right 2015       Expanded or extensive additional review of patient history:     Home Situation  Home Environment: Long term care  Living Alone: No  Support Systems: Caregiver/Home Care Staff  Patient Expects to be Discharged to[de-identified] Skilled nursing facility  Current DME Used/Available at Home: Wheelchair,Walker, rolling    Hand dominance: Right    EXAMINATION OF PERFORMANCE DEFICITS:  Cognitive/Behavioral Status:  Neurologic State: Alert  Orientation Level: Oriented to person;Oriented to place;Oriented to time;Disoriented to situation  Cognition: Follows commands        Safety/Judgement: Decreased awareness of environment;Decreased awareness of need for assistance;Decreased awareness of need for safety;Decreased insight into deficits    Skin: intact    Edema: none noted    Hearing:       Vision/Perceptual:                           Acuity: Able to read clock/calendar on wall without difficulty         Range of Motion:  B UE  AROM: Generally decreased, functional (limited proximally B shoulders)                         Strength:  B UE  Strength: Generally decreased, functional                Coordination:  Coordination: Generally decreased, functional  Fine Motor Skills-Upper: Left Impaired;Right Impaired;Comment (arthritis limiting )         Tone & Sensation:  B UE, normal                            Balance:  Sitting: Impaired;High guard  Sitting - Static: Poor (constant support)    Functional Mobility and Transfers for ADLs:  Bed Mobility:  Rolling: Maximum assistance; Additional time;Assist x1  Supine to Sit: Assist x1;Minimum assistance (long sitting in bed)    Transfers:       ADL Assessment:  Feeding: Setup    Oral Facial Hygiene/Grooming: Setup    Bathing: Maximum assistance    Upper Body Dressing: Maximum assistance    Lower Body Dressing: Total assistance    Toileting: Total assistance         Completed OT evaluation and ADLs at bed level and long sitting due to max A for rolling and MAX cues for participation, patient motivated for ADLs and eating therefore structured evaluation tasks around this. Educated on safety and endurance training with encouragement for full participation in ADLs while in hospital. [address] understanding noted. ADL Intervention and task modifications:  Feeding  Container Management: Maximum assistance  Food to Mouth: Set-up  Drink to Mouth: Set-up    Grooming  Position Performed: Long sitting on bed  Washing Face: Set-up  Washing Hands: Set-up  Brushing Teeth: Set-up  Brushing/Combing Hair: Minimum assistance (for full completion of posterior head)  Cues: Visual/perceptual training/retraining          Pull forward into long sit 3x with min A but unable to maintain balance for more than ~15 seconds         Lower Body Dressing Assistance  Socks:  Total assistance (dependent)  Cues: Visual/perceptual training/retraining    Toileting  Clothing Management: Total assistance (dependent)    Cognitive Retraining  Safety/Judgement: Decreased awareness of environment;Decreased awareness of need for assistance;Decreased awareness of need for safety;Decreased insight into deficits    Therapeutic Exercise:  B UE shoulder flexion, unilateral, 5x each, limited ROM above 90* flexion   Functional Measure:    Barthel Index:  Bathin  Bladder: 0  Bowels: 0  Groomin  Dressin  Feedin  Mobility: 0  Stairs: 0  Toilet Use: 0  Transfer (Bed to Chair and Back): 0  Total: 10/100      The Barthel ADL Index: Guidelines  1. The index should be used as a record of what a patient does, not as a record of what a patient could do. 2. The main aim is to establish degree of independence from any help, physical or verbal, however minor and for whatever reason. 3. The need for supervision renders the patient not independent. 4. A patient's performance should be established using the best available evidence. Asking the patient, friends/relatives and nurses are the usual sources, but direct observation and common sense are also important. However direct testing is not needed. 5. Usually the patient's performance over the preceding 24-48 hours is important, but occasionally longer periods will be relevant. 6. Middle categories imply that the patient supplies over 50 per cent of the effort. 7. Use of aids to be independent is allowed. Score Interpretation (from [address] [address])    Independent   60-79 Minimally independent   40-59 Partially dependent   20-39 Very dependent   <20 Totally dependent     -Ramesh Pace., Barthel, D.W. (1965). Functional evaluation: the Barthel Index. [facility] ([address] (). The Barthel activities of daily living index: self-reporting versus actual performance in the old (> or = 75 years). Journal of [address] [place] [address] 45(7), [facility], ...F, Ana Rosa Myers., Mali Perez. (1999).  Measuring the change in disability after inpatient rehabilitation; comparison of the responsiveness of the Barthel Index and Functional [place] Measure. Journal of Neurology, Neurosurgery, and Psychiatry, 66(4), 999-139. PURVI Escoto.ELAINE, APARNA Ventura, & Erika Duenas M.A. (2004) Assessment of post-stroke quality of life in cost-effectiveness studies: The usefulness of the Barthel Index and the EuroQoL-5D. Quality of Life Research, 15, 188-95     Occupational Therapy Evaluation Charge Determination   History Examination Decision-Making   HIGH Complexity : Extensive review of history including physical, cognitive and psychosocial history  HIGH Complexity : 5 or more performance deficits relating to physical, cognitive , or psychosocial skils that result in activity limitations and / or participation restrictions HIGH Complexity : Patient presents with comorbidities that affect occupational performance. Signifigant modification of tasks or assistance (eg, physical or verbal) with assessment (s) is necessary to enable patient to complete evaluation       Based on the above components, the patient evaluation is determined to be of the following complexity level: HIGH   Pain Rating:  None noted    Activity Tolerance:   Fair, SpO2 stable on RA, and requires frequent rest breaks    After treatment patient left in no apparent distress:    Heels elevated for pressure relief, Call bell within reach, and Side rails x 3    COMMUNICATION/EDUCATION:   The patients plan of care was discussed with: Physical therapist and Registered nurse. Home safety education was provided and the patient/caregiver indicated understanding., Patient/family have participated as able in goal setting and plan of care. , and Patient/family agree to work toward stated goals and plan of care. This patients plan of care is appropriate for delegation to [facility] [place].     Thank you for this referral.  Nacho Andrew, OT  Time Calculation: 21 mins

## 2022-02-16 LAB
ANION GAP SERPL CALC-SCNC: 6 MMOL/L (ref 5–15)
BASOPHILS # BLD: 0 K/UL (ref 0–0.1)
BASOPHILS NFR BLD: 1 % (ref 0–1)
BUN SERPL-MCNC: 30 MG/DL (ref 6–20)
BUN/CREAT SERPL: 17 (ref 12–20)
CALCIUM SERPL-MCNC: 8.6 MG/DL (ref 8.5–10.1)
CHLORIDE SERPL-SCNC: 108 MMOL/L (ref 97–108)
CO2 SERPL-SCNC: 24 MMOL/L (ref 21–32)
CREAT SERPL-MCNC: 1.81 MG/DL (ref 0.55–1.02)
DIFFERENTIAL METHOD BLD: ABNORMAL
EOSINOPHIL # BLD: 0.2 K/UL (ref 0–0.4)
EOSINOPHIL NFR BLD: 6 % (ref 0–7)
ERYTHROCYTE [DISTWIDTH] IN BLOOD BY AUTOMATED COUNT: 13.2 % (ref 11.5–14.5)
GLUCOSE SERPL-MCNC: 118 MG/DL (ref 65–100)
HCT VFR BLD AUTO: 24.4 % (ref 35–47)
HCT VFR BLD AUTO: 25.4 % (ref 35–47)
HGB BLD-MCNC: 8 G/DL (ref 11.5–16)
HGB BLD-MCNC: 8.1 G/DL (ref 11.5–16)
IMM GRANULOCYTES # BLD AUTO: 0 K/UL (ref 0–0.04)
IMM GRANULOCYTES NFR BLD AUTO: 0 % (ref 0–0.5)
LYMPHOCYTES # BLD: 0.9 K/UL (ref 0.8–3.5)
LYMPHOCYTES NFR BLD: 22 % (ref 12–49)
MAGNESIUM SERPL-MCNC: 2 MG/DL (ref 1.6–2.4)
MCH RBC QN AUTO: 31.6 PG (ref 26–34)
MCHC RBC AUTO-ENTMCNC: 32.8 G/DL (ref 30–36.5)
MCV RBC AUTO: 96.4 FL (ref 80–99)
MONOCYTES # BLD: 0.3 K/UL (ref 0–1)
MONOCYTES NFR BLD: 9 % (ref 5–13)
NEUTS SEG # BLD: 2.5 K/UL (ref 1.8–8)
NEUTS SEG NFR BLD: 62 % (ref 32–75)
NRBC # BLD: 0 K/UL (ref 0–0.01)
NRBC BLD-RTO: 0 PER 100 WBC
PHOSPHATE SERPL-MCNC: 3.6 MG/DL (ref 2.6–4.7)
PLATELET # BLD AUTO: 204 K/UL (ref 150–400)
PMV BLD AUTO: 8.9 FL (ref 8.9–12.9)
POTASSIUM SERPL-SCNC: 4 MMOL/L (ref 3.5–5.1)
RBC # BLD AUTO: 2.53 M/UL (ref 3.8–5.2)
SODIUM SERPL-SCNC: 138 MMOL/L (ref 136–145)
WBC # BLD AUTO: 3.9 K/UL (ref 3.6–11)

## 2022-02-16 PROCEDURE — 74011250637 HC RX REV CODE- 250/637: Performed by: HOSPITALIST

## 2022-02-16 PROCEDURE — 84100 ASSAY OF PHOSPHORUS: CPT

## 2022-02-16 PROCEDURE — 65660000000 HC RM CCU STEPDOWN

## 2022-02-16 PROCEDURE — 99232 SBSQ HOSP IP/OBS MODERATE 35: CPT | Performed by: SPECIALIST

## 2022-02-16 PROCEDURE — 85025 COMPLETE CBC W/AUTO DIFF WBC: CPT

## 2022-02-16 PROCEDURE — 83735 ASSAY OF MAGNESIUM: CPT

## 2022-02-16 PROCEDURE — 36415 COLL VENOUS BLD VENIPUNCTURE: CPT

## 2022-02-16 PROCEDURE — 74011250637 HC RX REV CODE- 250/637: Performed by: NURSE PRACTITIONER

## 2022-02-16 PROCEDURE — 85018 HEMOGLOBIN: CPT

## 2022-02-16 PROCEDURE — 74011250637 HC RX REV CODE- 250/637: Performed by: INTERNAL MEDICINE

## 2022-02-16 PROCEDURE — 80048 BASIC METABOLIC PNL TOTAL CA: CPT

## 2022-02-16 RX ADMIN — ARIPIPRAZOLE 20 MG: 5 TABLET ORAL at 09:14

## 2022-02-16 RX ADMIN — CARBIDOPA AND LEVODOPA 2 TABLET: 25; 100 TABLET ORAL at 18:23

## 2022-02-16 RX ADMIN — CARVEDILOL 12.5 MG: 12.5 TABLET, FILM COATED ORAL at 09:15

## 2022-02-16 RX ADMIN — APIXABAN 2.5 MG: 2.5 TABLET, FILM COATED ORAL at 09:15

## 2022-02-16 RX ADMIN — ASPIRIN 81 MG CHEWABLE TABLET 81 MG: 81 TABLET CHEWABLE at 09:15

## 2022-02-16 RX ADMIN — DILTIAZEM HYDROCHLORIDE 60 MG: 30 TABLET, FILM COATED ORAL at 06:48

## 2022-02-16 RX ADMIN — Medication 1000 UNITS: at 09:15

## 2022-02-16 RX ADMIN — CARBIDOPA AND LEVODOPA 2 TABLET: 25; 100 TABLET ORAL at 21:16

## 2022-02-16 RX ADMIN — DILTIAZEM HYDROCHLORIDE 60 MG: 30 TABLET, FILM COATED ORAL at 12:03

## 2022-02-16 RX ADMIN — CILOSTAZOL 100 MG: 50 TABLET ORAL at 06:48

## 2022-02-16 RX ADMIN — PANTOPRAZOLE SODIUM 40 MG: 40 TABLET, DELAYED RELEASE ORAL at 06:48

## 2022-02-16 RX ADMIN — CARBIDOPA AND LEVODOPA 2 TABLET: 25; 100 TABLET ORAL at 09:14

## 2022-02-16 RX ADMIN — APIXABAN 2.5 MG: 2.5 TABLET, FILM COATED ORAL at 18:24

## 2022-02-16 RX ADMIN — CILOSTAZOL 100 MG: 50 TABLET ORAL at 18:23

## 2022-02-16 RX ADMIN — CARVEDILOL 12.5 MG: 12.5 TABLET, FILM COATED ORAL at 18:23

## 2022-02-16 RX ADMIN — VITAM B12 100 MCG: 100 TAB at 09:15

## 2022-02-16 RX ADMIN — DILTIAZEM HYDROCHLORIDE 60 MG: 30 TABLET, FILM COATED ORAL at 18:23

## 2022-02-16 RX ADMIN — DULOXETINE HYDROCHLORIDE 120 MG: 60 CAPSULE, DELAYED RELEASE ORAL at 09:15

## 2022-02-16 RX ADMIN — ATORVASTATIN CALCIUM 40 MG: 40 TABLET, FILM COATED ORAL at 21:17

## 2022-02-16 RX ADMIN — CARBIDOPA AND LEVODOPA 2 TABLET: 25; 100 TABLET ORAL at 12:03

## 2022-02-16 NOTE — PROGRESS NOTES
[facility]  Hospitalist Group                                                                                          Hospitalist Progress Note  Deniz Kay MD  Answering service: [phone] or [phone] from in house phone        Date of Service:  2022  NAME:  Seth Parikh  :  1940  MRN:  [id number]      Admission Summary:   Patient is an 50-year-old female with a complex medical history including afib, chf, cad recent covid infection , CVA, PUD and DM, pt from SNF/[facility],  presenting to ED with chest pain, hypoxia and shortness of breath. Interval history / Subjective:   No complaints. Stable. Reviewed that since she has declined PPM, should be DC back to SNF. She is in agreement. Assessment & Plan:     Afib with RVR   - s/p diltiazem gtt, now off due to bradycardia   - Continue PO diltiazem to 60 TID  - Continue coreg 12.5mg   - Cardiology following  - Monitor on telemetry   - Resume eliquis, and if tolerates can consider Watchman.   - May need watchman device o/p   - Agree with AV note ablation and BiV PPM given patient's difficult to control Afib, as well as multiple readmissions for this issue. - If no sign of bleeding, should be hopefully stable for DC tomorrow. Discussed with CM, working on SNF     Chest pain - denies CP today, per report complained on admit. Suspect 2/2 Afib   CAD s/p CABG   Type 2 MI - demand from Afib with RVR  - troponin flat  - Cards following  - normal perfusion scan   - Continue ASA, Pletal, statin     Acute on chronic systolic CHF - NYHA class IV on admit, last EF 22 40-45%  Acute hypoxic respiratory failure - resolved. - Continue GDMT --> coreg, ASA, statin  - Hold bumex due to elevated creatinine. Transition to PO bumex when Cr improves.    - Wean O2 as tolerated for O2 sat 90%  - I and O and daily weights  - Cardiology following     CKD - basleline 1.5-1.8  - Bump recently due to secondary to overdiuresis  - Holding bumex, and monitor  - Avoid nephrotoxins, and renally dose meds. Anemia - chronic  - monitor and transfuse for hemoglobin less than 7   - previously recommended for bone marrow biopsy by Dr. Freddie Flowers. - No urgency for biopsy at this time, hemoglobin has been stable     H/o CVA   H/o L carotid stenosis s/p CEA - ASA, plavix, statin   Parkinsons disease - on sinemet   Anxiety/bipolar? - abilify, duloxetine      Code status: DDNR on file  Prophylaxis: lovenox   Care Plan discussed with: pt, RN   Anticipated Disposition: Back to SNF, medically ready. CM aware. PT/OT consults     Hospital Problems  Date Reviewed: 1/17/2022          Codes Class Noted POA    Rapid atrial fibrillation [facility]) ICD-10-CM: I48.91  ICD-9-CM: 427.31  2/11/2022 Unknown        CHF exacerbation ([facility] ICD-10-CM: I50.9  ICD-9-CM: 428.0  1/15/2022 Unknown                Review of Systems:   A comprehensive review of systems was negative except for that written in the HPI. Vital Signs:    Last 24hrs VS reviewed since prior progress note. Most recent are:  Visit Vitals  BP 93/60 (BP 1 Location: Right upper arm, BP Patient Position: At rest)   Pulse 66   Temp 98.5 °F (36.9 °C)   Resp 22   Ht 5' 5\" (1.651 m)   Wt 75.1 kg (165 lb 9.1 oz)   SpO2 95%   BMI 27.55 kg/m²         Intake/Output Summary (Last 24 hours) at 2/16/2022 1742  Last data filed at 2/16/2022 8768  Gross per 24 hour   Intake    Output 700 ml   Net -700 ml        Physical Examination:     I had a face to face encounter with this patient and independently examined them on 2/16/2022 as outlined below:          Constitutional:  No acute distress, cooperative, pleasant    ENT:  Oral mucosa moist, oropharynx benign. Resp:  Exp wheezing, no increased work of breathing. No accessory muscle use. Able to speak in full sentences, saturating 98% on RA   CV:  Irregularly irregular, rate in 60's, no murmurs, gallops, rubs    GI:  Soft, non distended, non tender. normoactive bowel sounds, no hepatosplenomegaly     Musculoskeletal:  1+ edema, warm, 2+ pulses throughout    Neurologic:  Moves all extremities. AAOx3, CN II-XII reviewed             Data Review:    Review and/or order of clinical lab test  Review and/or order of tests in the radiology section of CPT  Review and/or order of tests in the medicine section of CPT      Labs:     Recent Labs     02/16/22  1537 02/16/22  0321 02/15/22  0503 02/15/22  0503   WBC  --  3.9  --  4.4   HGB 8.1* 8.0*   < > 8.6*   HCT 25.4* 24.4*   < > 25.5*   PLT  --  204  --  186    < > = values in this interval not displayed. Recent Labs     02/16/22  0321 02/15/22  0503 02/14/22  0104    134* 133*   K 4.0 4.1 4.3    103 103   CO2 24 26 25   BUN 30* 31* 29*   CREA 1.81* 1.72* 1.61*   * 143* 177*   CA 8.6 9.3 8.9   MG 2.0 1.9 1.7   PHOS 3.6 3.8 3.7     No results for input(s): ALT, AP, TBIL, TBILI, TP, ALB, GLOB, GGT, AML, LPSE in the last 72 hours. No lab exists for component: SGOT, GPT, AMYP, HLPSE  No results for input(s): INR, PTP, APTT, INREXT, INREXT in the last 72 hours. No results for input(s): FE, TIBC, PSAT, FERR in the last 72 hours. Lab Results   Component Value Date/Time    Folate 7.8 01/25/2022 05:07 AM      No results for input(s): PH, PCO2, PO2 in the last 72 hours. No results for input(s): CPK, CKNDX, TROIQ in the last 72 hours.     No lab exists for component: CPKMB  Lab Results   Component Value Date/Time    Cholesterol, total 148 09/23/2020 04:27 AM    HDL Cholesterol 52 09/23/2020 04:27 AM    LDL, calculated 83.8 09/23/2020 04:27 AM    Triglyceride 61 09/23/2020 04:27 AM    CHOL/HDL Ratio 2.8 09/23/2020 04:27 AM     Lab Results   Component Value Date/Time    Glucose (POC) 206 (H) 01/27/2022 11:03 AM    Glucose (POC) 177 (H) 01/27/2022 06:39 AM    Glucose (POC) 189 (H) 01/26/2022 10:36 PM    Glucose (POC) 270 (H) 01/26/2022 04:38 PM    Glucose (POC) 230 (H) 01/26/2022 11:21 AM     Lab Results Component Value Date/Time    Color YELLOW/STRAW 12/30/2021 08:23 PM    Appearance CLOUDY (A) 12/30/2021 08:23 PM    Specific gravity 1.017 12/30/2021 08:23 PM    pH (UA) 6.0 12/30/2021 08:23 PM    Protein 100 (A) 12/30/2021 08:23 PM    Glucose Negative 12/30/2021 08:23 PM    Ketone Negative 12/30/2021 08:23 PM    Bilirubin Negative 12/30/2021 08:23 PM    Urobilinogen 1.0 12/30/2021 08:23 PM    Nitrites Negative 12/30/2021 08:23 PM    Leukocyte Esterase MODERATE (A) 12/30/2021 08:23 PM    Epithelial cells FEW 12/30/2021 08:23 PM    Bacteria 1+ (A) 12/30/2021 08:23 PM    WBC 0-4 12/30/2021 08:23 PM    RBC 0-5 12/30/2021 08:23 PM         Medications Reviewed:     Current Facility-Administered Medications   Medication Dose Route Frequency    apixaban (ELIQUIS) tablet 2.5 mg  2.5 mg Oral BID    zolpidem (AMBIEN) tablet 5 mg  5 mg Oral QHS PRN    dilTIAZem IR (CARDIZEM) tablet 60 mg  60 mg Oral TIDAC    ARIPiprazole (ABILIFY) tablet 20 mg  20 mg Oral DAILY    atorvastatin (LIPITOR) tablet 40 mg  40 mg Oral QHS    carbidopa-levodopa (SINEMET)  mg per tablet 2 Tablet  2 Tablet Oral QID    carvediloL (COREG) tablet 12.5 mg  12.5 mg Oral BID WITH MEALS    cilostazoL (PLETAL) tablet 100 mg  100 mg Oral ACB&D    cholecalciferol (VITAMIN D3) (1000 Units /25 mcg) tablet 1,000 Units  1,000 Units Oral DAILY    cyanocobalamin (VITAMIN B12) tablet 100 mcg  100 mcg Oral DAILY    DULoxetine (CYMBALTA) capsule 120 mg  120 mg Oral DAILY    nitroglycerin (NITROSTAT) tablet 0.4 mg  0.4 mg SubLINGual Q5MIN PRN    pantoprazole (PROTONIX) tablet 40 mg  40 mg Oral ACB    [Held by provider] bumetanide (BUMEX) injection 1 mg  1 mg IntraVENous BID    aspirin chewable tablet 81 mg  81 mg Oral DAILY     ______________________________________________________________________  EXPECTED LENGTH OF STAY: 4d 2h  ACTUAL LENGTH OF STAY:          5                 Susan Alegria MD

## 2022-02-16 NOTE — PROGRESS NOTES
[facility]  Cardiology Care Note                      Patient Name: Ramona Fletcher Post - YW:[id number]  Primary Cardiologist: Stephan Lindsey MD        Interval HPI/Subjective:  Ms. Matthew Masterson is an [de-identified] y.o. female with extensive PMH (see below) which includes afib, CAD, CABG, stents, chronic LBBB,  who presented to ER on 2/11/22 with chief c/o chest pain and rapid heart rate- afib with RVR and CHF exacerbation. She remains in afib but HR is now controlled on coreg and diltiazem. She continues to decline PPM/AVN ablation, stated she is not ready for pacemaker. Assessment and Plan     1. Afib with intermittent RVR   -HR overall controlled. Continue diltiazem and coreg.    -Dr. Geovany Tirado evaluated and recommended.-BiV/PPM, AVN ablation if rate not controlled off amiodarone. She declines PPM/AVN at this time. -[id number]. Retrial low dose eliquis. If she tolerates in regard to anemia, may consider Watchman as outpatient. 2. Acute on chronic systolic CHF   -NYHA III EF 40-45% (echo 1/9/22)   -Bumex on hold- creatinine trending up   -Continue Coreg. No ACE-I/ARB due to renal dysfunction     3. Elevated troponin   -Trops -[phone]. No chest pain   -Lexiscan stress test 1/11/22: no significant perfusion defect or ischemia.   -Troponin elevation due to type II MI (supply demand mismatch due to afib with RVR)    4. CAD:   -Hx of  CABG, 4/2021: PCI/STEPHAN native LAD , patent QUINTANA to LAD, VG to D1 to OM. -ASA, statin,  BB.     -Off plavix since now on Eliquis. 5. HTN   -BP labile. Continue coreg. 6. Elevated creatinine/hx of CKD   -creatinine continues to trend back up   -Bumex on hold. 7. CKD:    -creatinine 1.81, trending up   -Diuretic on hold. 8. Anemia, chronic   -Hgb 8. Will repeat Hgb at [facility] today.    -denies any blood in stool.  Last EGD/Colonoscopy 12/2021 unrevealing, -Previously seen by hematology who recommended BM biopsy. 9. Left carotid stenosis s/p CEA: asa, statin, pletal    She continues to refuse PPM/AVN ablation. Fortunately, her HR remains overall <110 on BB and diltiazem. We started retrial of low dose Eliquis yesterday. No evidence of bleeding but Hgb with slight trend down today  (hgb 8). Will recheck H&H at [facility] today. Her systolic CHF appears stable on exam with no c/o SOB. Creatinine rising despite holding diuretic. Patient seen on the day of progress note and examined  and agree with Advance Practice Provider (TABBY, NP,PA)  assessment and plans. No significant complaints reported  Continue eliquis for now but monitor HH carefully  Low threshold to stop oac   If able to tolerate eliquis then in my opinion if patient is agreable watchman should be considered        CARDIAC TESTING hx:   03/29/21  Renal angiography:  Conclusion  Results: After angiography 60 to 70% stenosis of ostium of right renal artery was noted. On IVUS imaging the minimal luminal dimensions were 4 x 2.1 mm with MLA of 8 mm². Left renal angiogram demonstrated 50 to 60% angiographic stenosis which on IVUS appeared to be even less prominent with minimal luminal dimensions of 4.2 x 3.7 mm. Conclusion: Moderate to severe left renal artery stenosis and mild to moderate right renal arterial stenosis. Both stenotic lesions are ostial and atherosclerotic in etiology. Signed by: Evette Cooper MD on 4/5/2021 10:00 AM    Cardiac testing:  Nuclear stress test 1/11/2021: no ischemia or infarction  [facility] 4/1/22: 1)Severe antive 3 vessel CAD  2)Patetn LIMA to LAD, VG to d1 to OM  3. Occluded native RCA with rPDA collateralized from left system. All arteries are heavily calcified  4. Native distal LAD with 90% stenosis  5. S/p PCI of native distal LAS with 2.25 by 18 mm Xinece STEPHAN through LIMA graft  6.  Normal LVEDP  Echo 1/9/22: EF 40-45%,  Eco: 12/31/22: EF 55-60%, mild- mod MR  Echo: 8/26/21: EF 50-55%, mild-mod MR  Echo 4/26/21: EF 25-40%  Echo 4/3/2021: EF 30-35%  Echo 3/31/21: EF 25-30%    Other prior testing.   -2007 PCI x2 to LAD with STEPHAN  -cath 2/11/15 100% distal RCA, PDA fills from left. LAD ostial 70%, mid 99% within prior stents, distal 90%. Ostial LCX 90%, OM 1 80%. LV normal.  Surgical disease, she does not want to consider CABG.     ____________________________________________________________    HPI:  Ms. Divya Chowdary has PMH of HTN, atrial fibrillation, fluctuating LV function, CAD status post CABG and Status post PCI of native LAD in April 2021.  She does have severe three-vessel disease with a patent LIMA to the LAD and a patent graft sequential to the first diagonal obtuse marginal branch. Additional history includes LBBB,, diastolic dysfunction and fluctuating LV function, CVA, PAD with L CEA, AAA, renal artery stenosis, anemia, and Parkinsons, gastric ulcers with GIB in 2014 (normal mucosa by EGD 12/17/21), covid 19 last month. . Last hospitalized last month for CHF exacerbation and afib with RVR. Presented 2/13/21 with chief c/o chest pain, afib with RVR. Patient Active Problem List   Diagnosis Code    Hypotension I95.9    Coronary artery disease I25.10    S/P coronary artery stent placement Z95.5    Renal failure N19    Elevated troponin R77.8    Pericardial effusion I31.3    Lactic acidosis E87.2    AF (atrial fibrillation) ([facility]) I48.91    Anemia D64.9    GI bleed K92.2    Syncope R55    Bradycardia R00.1    Acute kidney injury ([address] N17.9    Postoperative anemia due to acute blood loss D62    S/P CABG (coronary artery bypass graft) Z95.1    Edema R60.9    Diabetes mellitus (HCC) E11.9    CKD (chronic kidney disease), stage III ([facility]) N18.30    Fall W19. Lacie Mir    Acute ischemic stroke ([facility] I63.9    HTN (hypertension) I10    Carotid artery stenosis, symptomatic, left I65.22    Generalized weakness R53.1    Weakness R53.1    UTI (urinary tract infection) N39.0  SOB (shortness of breath) R06.02    Cellulitis and abscess of lower extremity L03.119, L02.419    CAP (community acquired pneumonia) J18.9    Acute on chronic respiratory failure with hypoxia ([facility]) J96.21    CHF (congestive heart failure) ([facility]) I50.9    CHF exacerbation ([facility]) I50.9    Pulmonary edema J81.1    Atrial fibrillation with RVR ([facility]) I48.91    Acute respiratory failure with hypoxia ([facility]) J96.01    Acute on chronic systolic and diastolic heart failure, NYHA class 4 ([facility]) I50.43    Left bundle branch block I44.7    Rapid atrial fibrillation ([facility]) I48.91       Review of Systems:    [] Patient unable to provide secondary to condition    CONSTITUTIONAL: negative    ENT/MOUTH: negative  EYES: negative  CV: no chest pain   Respiratory: no SOB    GASTROINTESTINAL: negative   GENITOURINARY: negative   MUSCULOSKELETAL: negative  SKIN: negative  NEUROLOGICAL: negative   PSYCHOLOGICAL: negative    ENDOCRINE: negative        Past Medical History:   Diagnosis Date    Anxiety disorder     Atrial fibrillation ([facility])     CAD (coronary artery disease) 2007    stents, CABG x 3v    Carotid stenosis     Cervical stenosis of spinal canal 07/2019    Chronic kidney disease     Cough     CVA (cerebral vascular accident) ([facility] 07/2019    left lacunar infarct at head of caudate    Depression     AND CHRONIC ANXIETY    Diabetes ([facility])     GERD (gastroesophageal reflux disease)     High cholesterol     History of peptic ulcer     Bleeding ulcer with increased NSAID use    Hypertension     Left bundle branch block 01/01/2022    Left carotid stenosis 07/2019    s/p left CEA with Dr. Santo Borrero, old 2007    PUD (peptic ulcer disease)     Stroke ([facility]     Tremor     Valvular heart disease      Past Surgical History:   Procedure Laterality Date    COLONOSCOPY N/A 12/17/2021    COLONOSCOPY   :- performed by Nargis Orr MD at [facility] ENDOSCOPY    HX CAROTID ENDARTERECTOMY  07/2019    HX CORONARY ARTERY BYPASS GRAFT      HX TONSILLECTOMY  1963    AL CARDIAC SURG PROCEDURE UNLIST      CABG X3 VESSEL    AL TOTAL KNEE ARTHROPLASTY Right 2015     Current Facility-Administered Medications   Medication Dose Route Frequency    apixaban (ELIQUIS) tablet 2.5 mg  2.5 mg Oral BID    zolpidem (AMBIEN) tablet 5 mg  5 mg Oral QHS PRN    dilTIAZem IR (CARDIZEM) tablet 60 mg  60 mg Oral TIDAC    ARIPiprazole (ABILIFY) tablet 20 mg  20 mg Oral DAILY    atorvastatin (LIPITOR) tablet 40 mg  40 mg Oral QHS    carbidopa-levodopa (SINEMET)  mg per tablet 2 Tablet  2 Tablet Oral QID    carvediloL (COREG) tablet 12.5 mg  12.5 mg Oral BID WITH MEALS    cilostazoL (PLETAL) tablet 100 mg  100 mg Oral ACB&D    cholecalciferol (VITAMIN D3) (1000 Units /25 mcg) tablet 1,000 Units  1,000 Units Oral DAILY    cyanocobalamin (VITAMIN B12) tablet 100 mcg  100 mcg Oral DAILY    DULoxetine (CYMBALTA) capsule 120 mg  120 mg Oral DAILY    nitroglycerin (NITROSTAT) tablet 0.4 mg  0.4 mg SubLINGual Q5MIN PRN    pantoprazole (PROTONIX) tablet 40 mg  40 mg Oral ACB    [Held by provider] bumetanide (BUMEX) injection 1 mg  1 mg IntraVENous BID    aspirin chewable tablet 81 mg  81 mg Oral DAILY       No Known Allergies     FmHx: family history includes Heart Attack (age of onset: 72) in her mother; Hypertension in her mother; Other in her father; Parkinson's Disease in her brother. Social Hx :  reports that she quit smoking about 53 years ago. Her smoking use included cigarettes. She has a 1.25 pack-year smoking history. She has never used smokeless tobacco. She reports previous alcohol use. She reports that she does not use drugs.        Objective:    Physical Exam    Vitals:   Vitals:    02/16/22 1000 02/16/22 1200 02/16/22 1202 02/16/22 1400   BP:   122/65    Pulse: 75 85 83 66   Resp:   20    Temp:   98.8 °F (37.1 °C)    SpO2:   92%    Weight:       Height:           General:    Alert, cooperative, no distress, appears stated age. Neck:   Supple,    Back:     Symmetric,    Lungs:     Clear to auscultation anteriorly No use of accessory muscles. Heart[de-identified]    Irregularly irregular rate and rhythm. Grade II/VI systolic murmur at apex. Abdomen:     Soft, non-tender. Bowel sounds normal.    Extremities:   LLE tr-1+ edema, RLE no edema. Vascular skin changes noted. Vascular:   Pulses - 2+   Skin:   Skin color normal. No rashes on visible areas   Neurologic:   Alert, oriented x3. Telemetry: afib rate 80's-low 100 (<110)    ECG:   EKG Results     Procedure 720 Value Units Date/Time    EKG, 12 LEAD, INITIAL [[id number]] Collected: 02/11/22 1111    Order Status: Completed Updated: 02/11/22 1711     Ventricular Rate 106 BPM      QRS Duration 130 ms      Q-T Interval 378 ms      QTC Calculation (Bezet) 502 ms      Calculated R Axis -36 degrees      Calculated T Axis 173 degrees      Diagnosis --     Atrial fibrillation with rapid ventricular response  Left bundle branch block  When compared with ECG of 11-FEB-2022 09:14,  No significant change  Confirmed by Chacho Cardenas ([id number]) on 2/11/2022 5:10:48 PM            Data Review:     Radiology:   XR Results (most recent):  Results from Hospital Encounter encounter on 02/11/22    XR CHEST PORT    Narrative  Indication: Hypoxia    Comparison: 1/20/2022    Portable exam of the chest obtained at 944 demonstrates stable cardiomegaly. The  patient is status post median sternotomy. Bilateral pulmonary infiltrates are  noted most likely related to pulmonary edema. Trace bilateral pleural effusions. Impression  Pulmonary edema and trace bilateral pleural effusions. No results for input(s): CPK, TROIQ in the last 72 hours.     No lab exists for component: CKQMB, CPKMB, BMPP  Recent Labs     02/16/22  0321 02/15/22  0503    134*   K 4.0 4.1    103   CO2 24 26   BUN 30* 31*   CREA 1.81* 1.72*   * 143*   PHOS 3.6 3.8   CA 8.6 9.3     Recent Labs 02/16/22  0321 02/15/22  0503   WBC 3.9 4.4   HGB 8.0* 8.6*   HCT 24.4* 25.5*    186     No results for input(s): PTP, INR, AP, INREXT, INREXT in the last 72 hours. No lab exists for component: PTTP, GPT, SGOT  No results for input(s): CHOL, LDLC in the last 72 hours. No lab exists for component: TGL, HDLC,  HBA1C  No results for input(s): CRP, TSH, TSHEXT, TSHEXT in the last 72 hours.     No lab exists for component: ESR    Earma Cooks, MD    [facility] [address], [address] [address]  [facility]  [phone], [place], [place]

## 2022-02-16 NOTE — PROGRESS NOTES
0800:  Verbal bedside report given to Arianna Mcduffie RN oncoming nurse by Arnel Currie RN off-going nurse. Report included current pt status and condition, recent results, hx of present illness, heart rate and rhythm, and respiratory status.

## 2022-02-16 NOTE — PROGRESS NOTES
Physical Therapy    Attempted PT treatment. PT requesting lunch tray set up and refused activity at this time. Encouraged pt to sit EOB with therapy or transfer to chair for lunch, however pt refused. Assisted pt with needs, tray set up. Will con't to follow.     Deepthi Munoz, PT, MPT

## 2022-02-16 NOTE — PROGRESS NOTES
Occupational Therapy: Attempted session after allowing for time for patient's lunch. Patient received sleeping very soundly in bed, did not arouse to voice.  Will defer OT at this time and follow up later per POC  Sadia Carter OTR/L

## 2022-02-17 LAB
ANION GAP SERPL CALC-SCNC: 8 MMOL/L (ref 5–15)
BASOPHILS # BLD: 0 K/UL (ref 0–0.1)
BASOPHILS NFR BLD: 1 % (ref 0–1)
BUN SERPL-MCNC: 32 MG/DL (ref 6–20)
BUN/CREAT SERPL: 17 (ref 12–20)
CALCIUM SERPL-MCNC: 8.8 MG/DL (ref 8.5–10.1)
CHLORIDE SERPL-SCNC: 108 MMOL/L (ref 97–108)
CO2 SERPL-SCNC: 23 MMOL/L (ref 21–32)
CREAT SERPL-MCNC: 1.84 MG/DL (ref 0.55–1.02)
DIFFERENTIAL METHOD BLD: ABNORMAL
EOSINOPHIL # BLD: 0.2 K/UL (ref 0–0.4)
EOSINOPHIL NFR BLD: 6 % (ref 0–7)
ERYTHROCYTE [DISTWIDTH] IN BLOOD BY AUTOMATED COUNT: 13.3 % (ref 11.5–14.5)
GLUCOSE SERPL-MCNC: 128 MG/DL (ref 65–100)
HCT VFR BLD AUTO: 24.8 % (ref 35–47)
HGB BLD-MCNC: 8.2 G/DL (ref 11.5–16)
IMM GRANULOCYTES # BLD AUTO: 0 K/UL (ref 0–0.04)
IMM GRANULOCYTES NFR BLD AUTO: 1 % (ref 0–0.5)
LYMPHOCYTES # BLD: 0.8 K/UL (ref 0.8–3.5)
LYMPHOCYTES NFR BLD: 21 % (ref 12–49)
MAGNESIUM SERPL-MCNC: 2.1 MG/DL (ref 1.6–2.4)
MCH RBC QN AUTO: 32 PG (ref 26–34)
MCHC RBC AUTO-ENTMCNC: 33.1 G/DL (ref 30–36.5)
MCV RBC AUTO: 96.9 FL (ref 80–99)
MONOCYTES # BLD: 0.4 K/UL (ref 0–1)
MONOCYTES NFR BLD: 11 % (ref 5–13)
NEUTS SEG # BLD: 2.2 K/UL (ref 1.8–8)
NEUTS SEG NFR BLD: 60 % (ref 32–75)
NRBC # BLD: 0 K/UL (ref 0–0.01)
NRBC BLD-RTO: 0 PER 100 WBC
PHOSPHATE SERPL-MCNC: 3.8 MG/DL (ref 2.6–4.7)
PLATELET # BLD AUTO: 243 K/UL (ref 150–400)
PMV BLD AUTO: 9 FL (ref 8.9–12.9)
POTASSIUM SERPL-SCNC: 4.4 MMOL/L (ref 3.5–5.1)
RBC # BLD AUTO: 2.56 M/UL (ref 3.8–5.2)
RBC MORPH BLD: ABNORMAL
SODIUM SERPL-SCNC: 139 MMOL/L (ref 136–145)
WBC # BLD AUTO: 3.6 K/UL (ref 3.6–11)

## 2022-02-17 PROCEDURE — 83735 ASSAY OF MAGNESIUM: CPT

## 2022-02-17 PROCEDURE — 99233 SBSQ HOSP IP/OBS HIGH 50: CPT | Performed by: SPECIALIST

## 2022-02-17 PROCEDURE — 65660000000 HC RM CCU STEPDOWN

## 2022-02-17 PROCEDURE — 80048 BASIC METABOLIC PNL TOTAL CA: CPT

## 2022-02-17 PROCEDURE — 74011250637 HC RX REV CODE- 250/637: Performed by: NURSE PRACTITIONER

## 2022-02-17 PROCEDURE — 85025 COMPLETE CBC W/AUTO DIFF WBC: CPT

## 2022-02-17 PROCEDURE — 84100 ASSAY OF PHOSPHORUS: CPT

## 2022-02-17 PROCEDURE — 74011250637 HC RX REV CODE- 250/637: Performed by: INTERNAL MEDICINE

## 2022-02-17 PROCEDURE — 74011250637 HC RX REV CODE- 250/637: Performed by: HOSPITALIST

## 2022-02-17 PROCEDURE — 36415 COLL VENOUS BLD VENIPUNCTURE: CPT

## 2022-02-17 PROCEDURE — 77010033678 HC OXYGEN DAILY

## 2022-02-17 RX ORDER — BUMETANIDE 1 MG/1
1 TABLET ORAL 2 TIMES DAILY
Status: DISCONTINUED | OUTPATIENT
Start: 2022-02-17 | End: 2022-02-18 | Stop reason: HOSPADM

## 2022-02-17 RX ORDER — GUAIFENESIN 100 MG/5ML
81 LIQUID (ML) ORAL EVERY OTHER DAY
Status: DISCONTINUED | OUTPATIENT
Start: 2022-02-19 | End: 2022-02-18 | Stop reason: HOSPADM

## 2022-02-17 RX ADMIN — CARVEDILOL 12.5 MG: 12.5 TABLET, FILM COATED ORAL at 18:00

## 2022-02-17 RX ADMIN — CILOSTAZOL 100 MG: 50 TABLET ORAL at 18:00

## 2022-02-17 RX ADMIN — CARVEDILOL 12.5 MG: 12.5 TABLET, FILM COATED ORAL at 09:10

## 2022-02-17 RX ADMIN — CARBIDOPA AND LEVODOPA 2 TABLET: 25; 100 TABLET ORAL at 21:23

## 2022-02-17 RX ADMIN — DULOXETINE HYDROCHLORIDE 120 MG: 60 CAPSULE, DELAYED RELEASE ORAL at 09:09

## 2022-02-17 RX ADMIN — ATORVASTATIN CALCIUM 40 MG: 40 TABLET, FILM COATED ORAL at 21:23

## 2022-02-17 RX ADMIN — DILTIAZEM HYDROCHLORIDE 60 MG: 30 TABLET, FILM COATED ORAL at 18:00

## 2022-02-17 RX ADMIN — PANTOPRAZOLE SODIUM 40 MG: 40 TABLET, DELAYED RELEASE ORAL at 07:13

## 2022-02-17 RX ADMIN — BUMETANIDE 1 MG: 1 TABLET ORAL at 11:48

## 2022-02-17 RX ADMIN — CARBIDOPA AND LEVODOPA 2 TABLET: 25; 100 TABLET ORAL at 09:09

## 2022-02-17 RX ADMIN — VITAM B12 100 MCG: 100 TAB at 09:09

## 2022-02-17 RX ADMIN — ASPIRIN 81 MG CHEWABLE TABLET 81 MG: 81 TABLET CHEWABLE at 09:10

## 2022-02-17 RX ADMIN — Medication 1000 UNITS: at 09:10

## 2022-02-17 RX ADMIN — BUMETANIDE 1 MG: 1 TABLET ORAL at 18:00

## 2022-02-17 RX ADMIN — CILOSTAZOL 100 MG: 50 TABLET ORAL at 07:12

## 2022-02-17 RX ADMIN — ARIPIPRAZOLE 20 MG: 5 TABLET ORAL at 09:10

## 2022-02-17 RX ADMIN — DILTIAZEM HYDROCHLORIDE 60 MG: 30 TABLET, FILM COATED ORAL at 07:12

## 2022-02-17 RX ADMIN — APIXABAN 2.5 MG: 2.5 TABLET, FILM COATED ORAL at 09:10

## 2022-02-17 RX ADMIN — DILTIAZEM HYDROCHLORIDE 60 MG: 30 TABLET, FILM COATED ORAL at 11:48

## 2022-02-17 RX ADMIN — CARBIDOPA AND LEVODOPA 2 TABLET: 25; 100 TABLET ORAL at 18:00

## 2022-02-17 RX ADMIN — APIXABAN 2.5 MG: 2.5 TABLET, FILM COATED ORAL at 18:00

## 2022-02-17 RX ADMIN — CARBIDOPA AND LEVODOPA 2 TABLET: 25; 100 TABLET ORAL at 14:10

## 2022-02-17 NOTE — PROGRESS NOTES
[facility]                                                                                          Hospitalist Progress Note  Goran Ambriz MD  Answering service: [phone] OR [phone] from in house phone        Date of Service:  2022  NAME:  Luis M Shannon  :  1940  MRN:  [id number]      Admission Summary:   Patient is an 67-year-old female with a complex medical history including afib, chf, cad recent covid infection , CVA, PUD and DM, pt from SNF[facility],  presenting to ED with chest pain, hypoxia and shortness of breath. Interval history / Subjective:   Seen for follow-up of acute CHF exacerbation. Patient seen and examined earlier today by me. No acute complaints at the time of my exam. Patient denies shortness of breath, palpitations, chest pain and other symptoms. Assessment & Plan:     Afib with RVR   - s/p diltiazem gtt, now off due to bradycardia   - Continue PO diltiazem to 60 TID  - Continue coreg 12.5mg   - Cardiology following  - Monitor on telemetry   - Resume eliquis, and if tolerates can consider Watchman.   - May need watchman device o/p   - Agree with AV note ablation and BiV PPM given patient's difficult to control Afib, as well as multiple readmissions for this issue. - If no sign of bleeding, should be hopefully stable for DC tomorrow. Discussed with CM, working on SNF     Chest pain - denies CP today, per report complained on admit. Suspect 2/2 Afib   CAD s/p CABG   Type 2 MI - demand from Afib with RVR  - troponin flat  - Cards following  - normal perfusion scan   - Continue ASA, Pletal, statin     Acute on chronic systolic CHF - NYHA class IV on admit, last EF 22 40-45%  Acute hypoxic respiratory failure - resolved. - Continue GDMT --> coreg, ASA, statin  - Hold bumex due to elevated creatinine. Transition to PO bumex when Cr improves.    - Wean O2 as tolerated for O2 sat 90%  - I and O and daily weights  - Cardiology following     CKD - basleline 1.5-1.8  - Bump recently due to secondary to overdiuresis  - Holding bumex, and monitor  - Avoid nephrotoxins, and renally dose meds. Anemia - chronic  - monitor and transfuse for hemoglobin less than 7   - previously recommended for bone marrow biopsy by Dr. Shine Whittington. - No urgency for biopsy at this time, hemoglobin has been stable     H/o CVA   H/o L carotid stenosis s/p CEA - ASA, plavix, statin   Parkinsons disease - on sinemet   Anxiety/bipolar? - abilify, duloxetine     Cardiology signed off. Pending rearrangement of for placement. The patient has refused to work with physical therapy and Occupational Therapy for the last couple of days. Code status: DDNR on file  Prophylaxis: lovenox   Care Plan discussed with: pt, RN   Anticipated Disposition: Back to SNF, medically ready. CM aware. PT/OT consults     Hospital Problems  Date Reviewed: 1/17/2022          Codes Class Noted POA    Rapid atrial fibrillation [facility]) ICD-10-CM: I48.91  ICD-9-CM: 427.31  2/11/2022 Unknown        CHF exacerbation ([facility] ICD-10-CM: I50.9  ICD-9-CM: 428.0  1/15/2022 Unknown                Review of Systems:   A comprehensive review of systems was negative except for that written in the HPI. Vital Signs:    Last 24hrs VS reviewed since prior progress note.  Most recent are:  Visit Vitals  BP (!) 117/92 (BP 1 Location: Right upper arm, BP Patient Position: At rest)   Pulse 88   Temp 98.6 °F (37 °C)   Resp 17   Ht 5' 5\" (1.651 m)   Wt 75.4 kg (166 lb 3.6 oz)   SpO2 93%   BMI 27.66 kg/m²         Intake/Output Summary (Last 24 hours) at 2/17/2022 1445  Last data filed at 2/16/2022 1921  Gross per 24 hour   Intake    Output 700 ml   Net -700 ml        Physical Examination:     I had a face to face encounter with this patient and independently examined them on 2/17/2022 as outlined below:          Constitutional:  No acute distress, cooperative, pleasant    ENT:  Oral mucosa moist, oropharynx benign. Resp:  Exp wheezing, no increased work of breathing. No accessory muscle use. Able to speak in full sentences, saturating 91-93%   CV:  Irregularly irregular, rate in 60's, no murmurs, gallops, rubs    GI:  Soft, non distended, non tender. normoactive bowel sounds, no hepatosplenomegaly     Musculoskeletal:  1+ edema, warm, 2+ pulses throughout    Neurologic:  Moves all extremities. AAOx3, CN II-XII reviewed             Data Review:    Review and/or order of clinical lab test  Review and/or order of tests in the radiology section of CPT  Review and/or order of tests in the medicine section of CPT      Labs:     Recent Labs     02/17/22  0525 02/16/22  1537 02/16/22 0321 02/16/22  0321   WBC 3.6  --   --  3.9   HGB 8.2* 8.1*   < > 8.0*   HCT 24.8* 25.4*   < > 24.4*     --   --  204    < > = values in this interval not displayed. Recent Labs     02/17/22  0525 02/16/22  0321 02/15/22  0503    138 134*   K 4.4 4.0 4.1    108 103   CO2 23 24 26   BUN 32* 30* 31*   CREA 1.84* 1.81* 1.72*   * 118* 143*   CA 8.8 8.6 9.3   MG 2.1 2.0 1.9   PHOS 3.8 3.6 3.8     No results for input(s): ALT, AP, TBIL, TBILI, TP, ALB, GLOB, GGT, AML, LPSE in the last 72 hours. No lab exists for component: SGOT, GPT, AMYP, HLPSE  No results for input(s): INR, PTP, APTT, INREXT, INREXT in the last 72 hours. No results for input(s): FE, TIBC, PSAT, FERR in the last 72 hours. Lab Results   Component Value Date/Time    Folate 7.8 01/25/2022 05:07 AM      No results for input(s): PH, PCO2, PO2 in the last 72 hours. No results for input(s): CPK, CKNDX, TROIQ in the last 72 hours.     No lab exists for component: CPKMB  Lab Results   Component Value Date/Time    Cholesterol, total 148 09/23/2020 04:27 AM    HDL Cholesterol 52 09/23/2020 04:27 AM    LDL, calculated 83.8 09/23/2020 04:27 AM    Triglyceride 61 09/23/2020 04:27 AM    CHOL/HDL Ratio 2.8 09/23/2020 04:27 AM     Lab Results Component Value Date/Time    Glucose (POC) 206 (H) 01/27/2022 11:03 AM    Glucose (POC) 177 (H) 01/27/2022 06:39 AM    Glucose (POC) 189 (H) 01/26/2022 10:36 PM    Glucose (POC) 270 (H) 01/26/2022 04:38 PM    Glucose (POC) 230 (H) 01/26/2022 11:21 AM     Lab Results   Component Value Date/Time    Color YELLOW/STRAW 12/30/2021 08:23 PM    Appearance CLOUDY (A) 12/30/2021 08:23 PM    Specific gravity 1.017 12/30/2021 08:23 PM    pH (UA) 6.0 12/30/2021 08:23 PM    Protein 100 (A) 12/30/2021 08:23 PM    Glucose Negative 12/30/2021 08:23 PM    Ketone Negative 12/30/2021 08:23 PM    Bilirubin Negative 12/30/2021 08:23 PM    Urobilinogen 1.0 12/30/2021 08:23 PM    Nitrites Negative 12/30/2021 08:23 PM    Leukocyte Esterase MODERATE (A) 12/30/2021 08:23 PM    Epithelial cells FEW 12/30/2021 08:23 PM    Bacteria 1+ (A) 12/30/2021 08:23 PM    WBC 0-4 12/30/2021 08:23 PM    RBC 0-5 12/30/2021 08:23 PM         Medications Reviewed:     Current Facility-Administered Medications   Medication Dose Route Frequency    [START ON 2/19/2022] aspirin chewable tablet 81 mg  81 mg Oral EVERY OTHER DAY    bumetanide (BUMEX) tablet 1 mg  1 mg Oral BID    apixaban (ELIQUIS) tablet 2.5 mg  2.5 mg Oral BID    zolpidem (AMBIEN) tablet 5 mg  5 mg Oral QHS PRN    dilTIAZem IR (CARDIZEM) tablet 60 mg  60 mg Oral TIDAC    ARIPiprazole (ABILIFY) tablet 20 mg  20 mg Oral DAILY    atorvastatin (LIPITOR) tablet 40 mg  40 mg Oral QHS    carbidopa-levodopa (SINEMET)  mg per tablet 2 Tablet  2 Tablet Oral QID    carvediloL (COREG) tablet 12.5 mg  12.5 mg Oral BID WITH MEALS    cilostazoL (PLETAL) tablet 100 mg  100 mg Oral ACB&D    cholecalciferol (VITAMIN D3) (1000 Units /25 mcg) tablet 1,000 Units  1,000 Units Oral DAILY    cyanocobalamin (VITAMIN B12) tablet 100 mcg  100 mcg Oral DAILY    DULoxetine (CYMBALTA) capsule 120 mg  120 mg Oral DAILY    nitroglycerin (NITROSTAT) tablet 0.4 mg  0.4 mg SubLINGual Q5MIN PRN    pantoprazole (PROTONIX) tablet 40 mg  40 mg Oral ACB     ______________________________________________________________________  EXPECTED LENGTH OF STAY: 4d 2h  ACTUAL LENGTH OF STAY:          [address] Zarina Magallanes MD

## 2022-02-17 NOTE — TELEPHONE ENCOUNTER
Pt currently in the hospital. Marta Chase NP asked I schedule 2 wk f/u. It will appear on discharge paperwork.     Future Appointments   Date Time Provider Abner Mendiola   3/7/2022  9:00 AM MD MARISOL Oliveros AMB

## 2022-02-17 NOTE — PROGRESS NOTES
Occupational Therapy: Patient approached for OT services, received awake and alert in bed. She had been calling out for the nurse (requesting medication, not for pain). Patient offered OT services and OOB activity- patient refused. Educated on benefits of OOB activity, etc though patient continued to refuse. Nurse made aware of patient's request- she states meds had been given recently and patient has asked multiple times today. Will follow up for OT services as able.    Ashvin Hays, OTR/L Pt's peripheral iv site infiltrated during cipro infusion. Attempted iv restart times 2. Unsuccessful. Will have another RN attempt iv restart.

## 2022-02-17 NOTE — PROGRESS NOTES
Transition Plan of Care  RUR 32%-High  Disposition-medically stable to return to [facility]. Spoke with liaison [facility] [phone] and they can accept back. This CM will started authorization. Reference # [id number]. CM will fax medicals to [phone]. Attending made aware and is agreeable to this disposition.

## 2022-02-17 NOTE — PROGRESS NOTES
Bedside shift change report given to David Rey RN (oncoming nurse) by Pat Arechiga RN (offgoing nurse). Report included the following information SBAR, Kardex, ED Summary, Intake/Output, MAR, Accordion, Recent Results, Med Rec Status and Cardiac Rhythm Afib.

## 2022-02-17 NOTE — PROGRESS NOTES
[facility]  Cardiology Care Note                      Patient Name: Frank Jameson - VY  Primary Cardiologist: Ashwin Curry MD        Subjective:  She remains in Afib, HR remains controlled. She again states that she does not want to consider PPM/AVN at this time, despite high risk for Afib with RVR again in future. She denies any chest pain, SOB, dizziness or lightheadedness. She states she is cold. Assessment and Plan     1. Afib with RVR on presentation   -HR now overall controlled. Continue diltiazem and coreg.    -Pt Refused BiV/PPM at this time. High risk for RVR in future but will continue medical management at this time. -[id number]. Re-trialing low dose eliquis. So far hgb stable. If she continues to tolerate [address] may consider Watchman as outpatient. 2. Acute on chronic systolic CHF   -NYHA III EF 40-45% (echo 22)   -Bumex on hold- will d/w Dr. Beba Bravo. Although creatinine 1.84 (no diuretics PTA)   -Continue Coreg. No ACE-I/ARB due to renal dysfunction     3. Elevated troponin   -Trops -151-405. No chest pain   -Lexiscan stress test 22: no  ischemia.   -Troponin elevation due to type II MI (supply demand mismatch due to afib with RVR)    4. CAD:   -Hx of  CABG, 2021: PCI/STEPHAN native LAD , patent QUINTANA to LAD, VG to D1 to OM. -Change ASA to every other day due to anemia and eliquis restarted   -statin,  BB.     -Do not resume plavix now since on Eliquis. 5. HTN   -BP labile. Continue coreg. 6. Elevated creatinine/hx of CKD   -creatinine    -Bumex on hold. 7. CKD:    -creatinine unchanged today 1.8    8. Anemia, chronic   -Hgb stabilized at 8.2   -Last EGD/Colonoscopy 2021 unrevealing,       9. Left carotid stenosis s/p CEA: asa, statin, pletal      Afib, rate controlled currently.  She is at high risk of readmission for Afib RVR but she continues to decline PPM/AVN. She is thus far tolerating low dose eliquis as anemia is stable. Will change ASA to every other day dosing and clearly remain off plavix since on eliquis. Can consider Watchman as outpatient. Patient seen on the day of progress note and examined  and agree with Advance Practice Provider (TABBY, NP,PA)  assessment and plans. She tells me she is feeling fine with no shortness of breath no chest discomfort. She continues to decline permanent pacemaker placement. Chronic atrial fibrillation. The heart rate remains controlled at this point with medications. We have restarted her Eliquis at an appropriate dose. I have discussed with his with her at length. She is agreeable to continue with Eliquis understanding potential for bleeding. I will decrease the aspirin to 81 mg every other day the patient is also on Pletal at this point. Currently off Plavix. I have discussed with her the potential for watchman procedure in detail. She seems to understand that she wants to wait before making a final decision. Eliquis use will be important is a challenge to see if she could tolerate Eliquis down the line in case watchman procedure is done. Stable from coronary disease standpoint status post PCI of the native LAD in April 2021 now off Plavix and on aspirin alone continue statin and beta-blocker. Blood pressure remained stable. At this point unfortunately given her multiple medical issues she has higher risk of readmission. Once again she has declined pacemaker placement. Resume bumex po as per home dose    I will sign off at this point and remain available as needed. Patient to follow-up with Dr. Garrett Mcardle her regular cardiologist within a week from her discharge. CARDIAC TESTING hx:   03/29/21  Renal angiography:  Conclusion  Results: After angiography 60 to 70% stenosis of ostium of right renal artery was noted.   On IVUS imaging the minimal luminal dimensions were 4 x 2.1 mm with MLA of 8 mm². Left renal angiogram demonstrated 50 to 60% angiographic stenosis which on IVUS appeared to be even less prominent with minimal luminal dimensions of 4.2 x 3.7 mm. Conclusion: Moderate to severe left renal artery stenosis and mild to moderate right renal arterial stenosis. Both stenotic lesions are ostial and atherosclerotic in etiology. Signed by: Miriam Moss MD on 4/5/2021 10:00 AM    Cardiac testing:  Nuclear stress test 1/11/2021: no ischemia or infarction  [facility] 4/1/22: 1)Severe antive 3 vessel CAD  2)Patetn LIMA to LAD, VG to d1 to OM  3. Occluded native RCA with rPDA collateralized from left system. All arteries are heavily calcified  4. Native distal LAD with 90% stenosis  5. S/p PCI of native distal LAS with 2.25 by 18 mm Xinece STEPHAN through LIMA graft  6. Normal LVEDP  Echo 1/9/22: EF 40-45%,  Eco: 12/31/22: EF 55-60%, mild- mod MR  Echo: 8/26/21: EF 50-55%, mild-mod MR  Echo 4/26/21: EF 25-40%  Echo 4/3/2021: EF 30-35%  Echo 3/31/21: EF 25-30%    Other prior testing.   -2007 PCI x2 to LAD with STEPHAN  -cath 2/11/15 100% distal RCA, PDA fills from left. LAD ostial 70%, mid 99% within prior stents, distal 90%. Ostial LCX 90%, OM 1 80%. LV normal.  Surgical disease, she does not want to consider CABG.     ____________________________________________________________    HPI:  Ms. Stevo Orosco has PMH of HTN, atrial fibrillation, fluctuating LV function, CAD status post CABG and Status post PCI of native LAD in April 2021.  She does have severe three-vessel disease with a patent LIMA to the LAD and a patent graft sequential to the first diagonal obtuse marginal branch. Additional history includes LBBB,, diastolic dysfunction and fluctuating LV function, CVA, PAD with L CEA, AAA, renal artery stenosis, anemia, and Parkinsons, gastric ulcers with GIB in 2014 (normal mucosa by EGD 12/17/21), covid 19 last month.  . Last hospitalized last month for CHF exacerbation and afib with RVR. Presented 2/13/21 with chief c/o chest pain, afib with RVR. Patient Active Problem List   Diagnosis Code    Hypotension I95.9    Coronary artery disease I25.10    S/P coronary artery stent placement Z95.5    Renal failure N19    Elevated troponin R77.8    Pericardial effusion I31.3    Lactic acidosis E87.2    AF (atrial fibrillation) ([facility]) I48.91    Anemia D64.9    GI bleed K92.2    Syncope R55    Bradycardia R00.1    Acute kidney injury ([facility] N17.9    Postoperative anemia due to acute blood loss D62    S/P CABG (coronary artery bypass graft) Z95.1    Edema R60.9    Diabetes mellitus ([facility]) E11.9    CKD (chronic kidney disease), stage III ([facility]) N18.30    Fall W19. Brandi Hodge    Acute ischemic stroke ([facility] I63.9    HTN (hypertension) I10    Carotid artery stenosis, symptomatic, left I65.22    Generalized weakness R53.1    Weakness R53.1    UTI (urinary tract infection) N39.0    SOB (shortness of breath) R06.02    Cellulitis and abscess of lower extremity L03.119, L02.419    CAP (community acquired pneumonia) J18.9    Acute on chronic respiratory failure with hypoxia ([facility]) J96.21    CHF (congestive heart failure) ([facility]) I50.9    CHF exacerbation ([facility]) I50.9    Pulmonary edema J81.1    Atrial fibrillation with RVR ([facility]) I48.91    Acute respiratory failure with hypoxia ([facility]) J96.01    Acute on chronic systolic and diastolic heart failure, NYHA class 4 ([facility]) I50.43    Left bundle branch block I44.7    Rapid atrial fibrillation ([facility]) I48.91       Review of Systems:    [] Patient unable to provide secondary to condition    Constitutional: feels cold.    ENT/MOUTH: negative  EYES: negative  CV: no chest pain   Respiratory: no SOB    GASTROINTESTINAL: negative   GENITOURINARY: negative   MUSCULOSKELETAL: negative  SKIN: negative  NEUROLOGICAL: negative   PSYCHOLOGICAL: negative    ENDOCRINE: negative        Past Medical History:   Diagnosis Date    Anxiety disorder     [facility]     CAD (coronary artery disease) 2007    stents, CABG x 3v    Carotid stenosis     Cervical stenosis of spinal canal 07/2019    Chronic kidney disease     Cough     CVA (cerebral vascular accident) ([facility] 07/2019    left lacunar infarct at head of caudate    Depression     AND CHRONIC ANXIETY    Diabetes (HCC)     GERD (gastroesophageal reflux disease)     High cholesterol     History of peptic ulcer     Bleeding ulcer with increased NSAID use    Hypertension     Left bundle branch block 01/01/2022    Left carotid stenosis 07/2019    s/p left CEA with Dr. Lili Mckenna MI, old 2007    PUD (peptic ulcer disease)     Stroke ([facility]     Tremor     Valvular heart disease      Past Surgical History:   Procedure Laterality Date    COLONOSCOPY N/A 12/17/2021    COLONOSCOPY   :- performed by Sam Payne MD at [address] HX CAROTID ENDARTERECTOMY  07/2019    HX CORONARY ARTERY BYPASS GRAFT      HX TONSILLECTOMY  1963    [place] CARDIAC SURG PROCEDURE UNLIST      CABG X3 VESSEL    [place] TOTAL KNEE ARTHROPLASTY Right 2015     Current Facility-Administered Medications   Medication Dose Route Frequency    [START ON 2/19/2022] aspirin chewable tablet 81 mg  81 mg Oral EVERY OTHER DAY    apixaban (ELIQUIS) tablet 2.5 mg  2.5 mg Oral BID    zolpidem (AMBIEN) tablet 5 mg  5 mg Oral QHS PRN    dilTIAZem IR (CARDIZEM) tablet 60 mg  60 mg Oral TIDAC    ARIPiprazole (ABILIFY) tablet 20 mg  20 mg Oral DAILY    atorvastatin (LIPITOR) tablet 40 mg  40 mg Oral QHS    carbidopa-levodopa (SINEMET)  mg per tablet 2 Tablet  2 Tablet Oral QID    carvediloL (COREG) tablet 12.5 mg  12.5 mg Oral BID WITH MEALS    cilostazoL (PLETAL) tablet 100 mg  100 mg Oral ACB&D    cholecalciferol (VITAMIN D3) (1000 Units /25 mcg) tablet 1,000 Units  1,000 Units Oral DAILY    cyanocobalamin (VITAMIN B12) tablet 100 mcg  100 mcg Oral DAILY    DULoxetine (CYMBALTA) capsule 120 mg  120 mg Oral DAILY  nitroglycerin (NITROSTAT) tablet 0.4 mg  0.4 mg SubLINGual Q5MIN PRN    pantoprazole (PROTONIX) tablet 40 mg  40 mg Oral ACB    [Held by provider] bumetanide (BUMEX) injection 1 mg  1 mg IntraVENous BID       No Known Allergies     FmHx: family history includes Heart Attack (age of onset: 72) in her mother; Hypertension in her mother; Other in her father; Parkinson's Disease in her brother. Social Hx :  reports that she quit smoking about 53 years ago. Her smoking use included cigarettes. She has a 1.25 pack-year smoking history. She has never used smokeless tobacco. She reports previous alcohol use. She reports that she does not use drugs. Objective:    Physical Exam    Vitals:   Vitals:    02/17/22 0600 02/17/22 0642 02/17/22 0713 02/17/22 1000   BP:  (!) 154/76 (!) 149/88    Pulse: 70 83 79 77   Resp:  18     Temp:  98.4 °F (36.9 °C)     SpO2:  95%     Weight:  75.4 kg (166 lb 3.6 oz)     Height:           General:    Alert, cooperative, no distress, appears stated age. Neck:   Supple,    Back:     Symmetric,    Lungs:     Clear to auscultation anteriorly, mildly diminished bases. No use of accessory muscles. Heart[de-identified]    Irregularly irregular rate and rhythm. Grade II/VI systolic murmur at apex. Abdomen:     Soft, non-tender. Bowel sounds normal.    Extremities:   No BLE edema. Vascular skin changes noted. Vascular:   Pulses - 2+   Skin:   Skin color normal. No rashes on visible areas   Neurologic:   Alert, oriented x3.         Telemetry: afib rate 80's-low 100 (<110)    ECG:   EKG Results     Procedure 720 Value Units Date/Time    EKG, 12 LEAD, INITIAL [[id number]] Collected: 02/11/22 1111    Order Status: Completed Updated: 02/11/22 1711     Ventricular Rate 106 BPM      QRS Duration 130 ms      Q-T Interval 378 ms      QTC Calculation (Bezet) 502 ms      Calculated R Axis -36 degrees      Calculated T Axis 173 degrees      Diagnosis --     Atrial fibrillation with rapid ventricular response  Left bundle branch block  When compared with ECG of 11-FEB-2022 09:14,  No significant change  Confirmed by Belkys Moreno ([id number]) on 2/11/2022 5:10:48 PM            Data Review:     Radiology:   XR Results (most recent):  Results from [place] encounter on 02/11/22    XR CHEST PORT    Narrative  Indication: Hypoxia    Comparison: 1/20/2022    Portable exam of the chest obtained at 944 demonstrates stable cardiomegaly. The  patient is status post median sternotomy. Bilateral pulmonary infiltrates are  noted most likely related to pulmonary edema. Trace bilateral pleural effusions. Impression  Pulmonary edema and trace bilateral pleural effusions. No results for input(s): CPK, TROIQ in the last 72 hours. No lab exists for component: CKQMB, CPKMB, BMPP  Recent Labs     02/17/22  0525 02/16/22  0321    138   K 4.4 4.0    108   CO2 23 24   BUN 32* 30*   CREA 1.84* 1.81*   * 118*   PHOS 3.8 3.6   CA 8.8 8.6     Recent Labs     02/17/22  0525 02/16/22  1537 02/16/22  0321 02/16/22  0321   WBC 3.6  --   --  3.9   HGB 8.2* 8.1*   < > 8.0*   HCT 24.8* 25.4*   < > 24.4*     --   --  204    < > = values in this interval not displayed. No results for input(s): PTP, INR, AP, INREXT, INREXT in the last 72 hours. No lab exists for component: PTTP, GPT, SGOT  No results for input(s): CHOL, LDLC in the last 72 hours. No lab exists for component: TGL, HDLC,  HBA1C  No results for input(s): CRP, TSH, TSHEXT, TSHEXT in the last 72 hours. No lab exists for component: ESR    Mak Sue, SUAD    [facility] [address] [facility] [address]  [place] [address] [address] [place], [place] [id number] [place] [address]

## 2022-02-18 ENCOUNTER — PATIENT OUTREACH (OUTPATIENT)
Dept: CASE MANAGEMENT | Age: 82
End: 2022-02-18

## 2022-02-18 VITALS
WEIGHT: 166.23 LBS | BODY MASS INDEX: 27.7 KG/M2 | SYSTOLIC BLOOD PRESSURE: 127 MMHG | DIASTOLIC BLOOD PRESSURE: 48 MMHG | TEMPERATURE: 98.9 F | RESPIRATION RATE: 18 BRPM | HEIGHT: 65 IN | OXYGEN SATURATION: 94 % | HEART RATE: 90 BPM

## 2022-02-18 PROBLEM — I50.9 CHF EXACERBATION (HCC): Status: RESOLVED | Noted: 2022-01-15 | Resolved: 2022-02-18

## 2022-02-18 PROBLEM — I48.91 RAPID ATRIAL FIBRILLATION (HCC): Status: RESOLVED | Noted: 2022-02-11 | Resolved: 2022-02-18

## 2022-02-18 LAB
ANION GAP SERPL CALC-SCNC: 7 MMOL/L (ref 5–15)
BASOPHILS # BLD: 0 K/UL (ref 0–0.1)
BASOPHILS NFR BLD: 0 % (ref 0–1)
BUN SERPL-MCNC: 31 MG/DL (ref 6–20)
BUN/CREAT SERPL: 17 (ref 12–20)
CALCIUM SERPL-MCNC: 8.9 MG/DL (ref 8.5–10.1)
CHLORIDE SERPL-SCNC: 107 MMOL/L (ref 97–108)
CO2 SERPL-SCNC: 23 MMOL/L (ref 21–32)
CREAT SERPL-MCNC: 1.81 MG/DL (ref 0.55–1.02)
DIFFERENTIAL METHOD BLD: ABNORMAL
EOSINOPHIL # BLD: 0.2 K/UL (ref 0–0.4)
EOSINOPHIL NFR BLD: 6 % (ref 0–7)
ERYTHROCYTE [DISTWIDTH] IN BLOOD BY AUTOMATED COUNT: 13.4 % (ref 11.5–14.5)
GLUCOSE SERPL-MCNC: 135 MG/DL (ref 65–100)
HCT VFR BLD AUTO: 27.1 % (ref 35–47)
HGB BLD-MCNC: 8.8 G/DL (ref 11.5–16)
IMM GRANULOCYTES # BLD AUTO: 0 K/UL (ref 0–0.04)
IMM GRANULOCYTES NFR BLD AUTO: 0 % (ref 0–0.5)
LYMPHOCYTES # BLD: 0.8 K/UL (ref 0.8–3.5)
LYMPHOCYTES NFR BLD: 19 % (ref 12–49)
MCH RBC QN AUTO: 31.4 PG (ref 26–34)
MCHC RBC AUTO-ENTMCNC: 32.5 G/DL (ref 30–36.5)
MCV RBC AUTO: 96.8 FL (ref 80–99)
MONOCYTES # BLD: 0.5 K/UL (ref 0–1)
MONOCYTES NFR BLD: 11 % (ref 5–13)
NEUTS SEG # BLD: 2.6 K/UL (ref 1.8–8)
NEUTS SEG NFR BLD: 64 % (ref 32–75)
NRBC # BLD: 0 K/UL (ref 0–0.01)
NRBC BLD-RTO: 0 PER 100 WBC
PLATELET # BLD AUTO: 266 K/UL (ref 150–400)
PLATELET COMMENTS,PCOM: ABNORMAL
PMV BLD AUTO: 9 FL (ref 8.9–12.9)
POTASSIUM SERPL-SCNC: 4.2 MMOL/L (ref 3.5–5.1)
RBC # BLD AUTO: 2.8 M/UL (ref 3.8–5.2)
RBC MORPH BLD: ABNORMAL
SODIUM SERPL-SCNC: 137 MMOL/L (ref 136–145)
WBC # BLD AUTO: 4.1 K/UL (ref 3.6–11)

## 2022-02-18 PROCEDURE — 74011250637 HC RX REV CODE- 250/637: Performed by: INTERNAL MEDICINE

## 2022-02-18 PROCEDURE — 85025 COMPLETE CBC W/AUTO DIFF WBC: CPT

## 2022-02-18 PROCEDURE — 80048 BASIC METABOLIC PNL TOTAL CA: CPT

## 2022-02-18 PROCEDURE — 36415 COLL VENOUS BLD VENIPUNCTURE: CPT

## 2022-02-18 PROCEDURE — 74011250637 HC RX REV CODE- 250/637: Performed by: HOSPITALIST

## 2022-02-18 PROCEDURE — 97535 SELF CARE MNGMENT TRAINING: CPT

## 2022-02-18 PROCEDURE — 74011250637 HC RX REV CODE- 250/637: Performed by: NURSE PRACTITIONER

## 2022-02-18 PROCEDURE — 97530 THERAPEUTIC ACTIVITIES: CPT

## 2022-02-18 RX ORDER — CARVEDILOL 12.5 MG/1
12.5 TABLET ORAL 2 TIMES DAILY WITH MEALS
Qty: 60 TABLET | Refills: 0 | Status: SHIPPED | OUTPATIENT
Start: 2022-02-18

## 2022-02-18 RX ORDER — DULOXETIN HYDROCHLORIDE 60 MG/1
120 CAPSULE, DELAYED RELEASE ORAL DAILY
Qty: 30 CAPSULE | Refills: 0 | Status: SHIPPED | OUTPATIENT
Start: 2022-02-19

## 2022-02-18 RX ORDER — GUAIFENESIN 100 MG/5ML
81 LIQUID (ML) ORAL EVERY OTHER DAY
Qty: 30 TABLET | Refills: 0 | Status: SHIPPED | OUTPATIENT
Start: 2022-02-19

## 2022-02-18 RX ORDER — PANTOPRAZOLE SODIUM 40 MG/1
40 TABLET, DELAYED RELEASE ORAL
Qty: 30 TABLET | Refills: 0 | Status: SHIPPED | OUTPATIENT
Start: 2022-02-19

## 2022-02-18 RX ORDER — DILTIAZEM HYDROCHLORIDE 60 MG/1
60 TABLET, FILM COATED ORAL
Qty: 90 TABLET | Refills: 0 | Status: SHIPPED | OUTPATIENT
Start: 2022-02-18

## 2022-02-18 RX ADMIN — BUMETANIDE 1 MG: 1 TABLET ORAL at 09:29

## 2022-02-18 RX ADMIN — CARBIDOPA AND LEVODOPA 2 TABLET: 25; 100 TABLET ORAL at 13:20

## 2022-02-18 RX ADMIN — VITAM B12 100 MCG: 100 TAB at 09:29

## 2022-02-18 RX ADMIN — PANTOPRAZOLE SODIUM 40 MG: 40 TABLET, DELAYED RELEASE ORAL at 07:06

## 2022-02-18 RX ADMIN — APIXABAN 2.5 MG: 2.5 TABLET, FILM COATED ORAL at 09:29

## 2022-02-18 RX ADMIN — DILTIAZEM HYDROCHLORIDE 60 MG: 30 TABLET, FILM COATED ORAL at 13:20

## 2022-02-18 RX ADMIN — ARIPIPRAZOLE 20 MG: 5 TABLET ORAL at 09:29

## 2022-02-18 RX ADMIN — Medication 1000 UNITS: at 09:29

## 2022-02-18 RX ADMIN — CARBIDOPA AND LEVODOPA 2 TABLET: 25; 100 TABLET ORAL at 09:29

## 2022-02-18 RX ADMIN — CARVEDILOL 12.5 MG: 12.5 TABLET, FILM COATED ORAL at 09:29

## 2022-02-18 RX ADMIN — DULOXETINE HYDROCHLORIDE 120 MG: 60 CAPSULE, DELAYED RELEASE ORAL at 09:29

## 2022-02-18 RX ADMIN — CILOSTAZOL 100 MG: 50 TABLET ORAL at 07:06

## 2022-02-18 RX ADMIN — DILTIAZEM HYDROCHLORIDE 60 MG: 30 TABLET, FILM COATED ORAL at 07:06

## 2022-02-18 NOTE — PROGRESS NOTES
Report call to  report given to nurse Ewa Johnson. All questions answered. Pt discharged to transport via stretcher alert oriented and on RA. IV removed with no issues.

## 2022-02-18 NOTE — PROGRESS NOTES
Problem: Self Care Deficits Care Plan (Adult)  Goal: *Acute Goals and Plan of Care (Insert Text)  Description: FUNCTIONAL STATUS PRIOR TO ADMISSION: Prior to recent admissions (multiple in Jan. 2022), pt was functional at the w/c level and modified independent with functional transfers to and from the wheelchair. She was discharged to [facility] to receive therapy services with believed plan to transition back to LTC, COVID+ Jan 2022 with transition to SNF then LTC. HOME SUPPORT: The patient lived at [facility], however following last admission pt resided in SNF to receive therapy services and required assist from staff for mobility/ADL/IADL tasks. Occupational Therapy Goals  Initiated 2/15/2022  1. Patient will perform self-feeding with supervision/set-up within 7 day(s). 2.  Patient will perform grooming EOB with supervision/set-up within 7 day(s). 3.  Patient will perform upper body dressing with supervision/set-up within 7 day(s). 4.  Patient will perform toilet transfers with moderate assistance  within 7 day(s). 5.  Patient will perform all aspects of toileting with maximal assistance within 7 day(s). 6.  Patient will participate in upper extremity therapeutic exercise/activities with supervision/set-up for 5 minutes within 7 day(s). 7.  Patient will utilize energy conservation techniques during functional activities with verbal cues within 7 day(s). Outcome: Progressing Towards Goal   OCCUPATIONAL THERAPY TREATMENT  Patient: Seth Parikh (78 y.o. female)  Date: 2/18/2022  Diagnosis: CHF exacerbation ([facility] [I50.9]  Rapid atrial fibrillation ([facility] [I48.91] <principal problem not specified>       Precautions: Fall,Contact  Chart, occupational therapy assessment, plan of care, and goals were reviewed. ASSESSMENT  Patient continues with skilled OT services and is progressing towards goals.   Patient participated in therapy services this morning with encouragement, and performed better than anticipated. She initially refused however once she began activity she participated well. Patient's sitting balance intact for ADL participation at EOB. Sitting duration >10 min unsupported. Patient limited by general weakness, and decreased strength in LE. 2 standing attempts completed with significant assistance required to maintain upright stance,  strong lean posterior and to left side noted. Impaired ROM in bilateral shoulders impacted grooming tasks. SNF level rehab services remain appropriate to maximize ADL/mobility independence    Current Level of Function Impacting Discharge (ADLs): min to mod assistance for UB bathing and dressing, max assist for LB bathing, dressing, Max A for toileting. Other factors to consider for discharge:          PLAN :  Patient continues to benefit from skilled intervention to address the above impairments. Continue treatment per established plan of care to address goals. Recommend with staff: encourage ADL participation     Recommend next OT session: progress POC- EOB activity, 2 person assist for transfers    Recommendation for discharge: (in order for the patient to meet his/her long term goals)  Therapy up to 5 days/week in SNF setting    This discharge recommendation:  Has been made in collaboration with the attending provider and/or case management    IF patient discharges home will need the following DME: TBD per rehab        SUBJECTIVE:   Patient cooperative     OBJECTIVE DATA SUMMARY:   Cognitive/Behavioral Status:  Neurologic State: Alert  Orientation Level: Oriented to person;Oriented to place  Cognition: Impaired decision making        Safety/Judgement: Awareness of environment;Decreased insight into deficits    Functional Mobility and Transfers for ADLs:  Bed Mobility:  Rolling: Moderate assistance  Supine to Sit: Maximum assistance;Assist x2  Scooting: Moderate assistance    Transfers:  Sit to Stand:  Moderate assistance;Maximum assistance;Assist x2   Side stepping along bed- 2 person (Mod A + Max A). Cues required to increase knee extension for upright stance       Balance:  Sitting: Intact; Without support  Standing: Impaired; With support  Standing - Static: Constant support;Poor    ADL Intervention:       Grooming  Position Performed: Seated edge of bed  Brushing/Combing Hair: Minimum assistance- unable to reach to back of head            Upper Body [address]: Minimum  assistance- assisted with doffing  old gown and pulling up sleeves to shoulders with new gown               Cognitive Retraining  Safety/Judgement: Awareness of environment;Decreased insight into deficits    Pain:  None indicated     Activity Tolerance:   Fair and requires rest breaks    After treatment patient left in no apparent distress:   Supine in bed, Call bell within reach, and Side rails x 3    COMMUNICATION/COLLABORATION:   The patients plan of care was discussed with: Physical therapist and Registered nurse.      Ashley Joyce OT  Time Calculation: 26 mins

## 2022-02-18 NOTE — DISCHARGE SUMMARY
Discharge Summary       PATIENT ID: Hernando Armijo  MRN: [id number]   YOB: 1940    DATE OF ADMISSION: 2/11/2022  9:11 AM    DATE OF DISCHARGE: 2/18/22   PRIMARY CARE PROVIDER: Adonis Mckeon DO     ATTENDING PHYSICIAN: Charles Drake MD  DISCHARGING PROVIDER: Charles Drake MD    To contact this individual call [phone] and ask the  to page. If unavailable ask to be transferred the Adult Hospitalist Department. CONSULTATIONS: IP CONSULT TO CARDIOLOGY  IP CONSULT TO ELECTROPHYSIOLOGY    PROCEDURES/SURGERIES: * No surgery found *    ADMITTING [facility] COURSE:   Hernando Armijo is a [de-identified] y.o. female who presents with chest pain      Patient is an 81-year-old female with a complex medical history including afib, chf, cad recent covid infection 1/20, CVA, PUD and DM, pt from SNF/[facility],  presenting to ED with chest pain, hypoxia and shortness of breath.  Apparently patient was 70% when EMS arrived at her facility.  Placed on nonrebreather with improvement in oxygen saturation.  EMS had diffuse ST elevations on her EKG and called a code STEMI from the field.  On arrival no signs of ST elevation on EKG.  Patient does have A. fib with RVR.  she is started with cardizem drip. Her SaO2 improved on 6 L nc now.  her chest pain resolved. Denied fever       CHF exacerbation ([facility] (1/15/2022)       Rapid atrial fibrillation ([facility] (2/11/2022)     Hospital course  Patient was admitted and diuresed with Bumex. Cardiology was consulted and they followed up. Patient was A. fib with RVR was rate controlled and the patient was slowly weaned off of oxygen. Patient became stable and was on room air and deemed stable for discharge.   I did a peer to peer with her insurance company who denied her SNF for rehab due to her baseline status not being changed much from her previous discharge from the hospital.  The patient will return to her long-term care at [facility]. DISCHARGE DIAGNOSES / PLAN:      1. A. fib with RVR. Resolved  2. Chest pain. Resolved  3. Type II NSTEMI. Resolved  4. Acute on chronic systolic CHF NYHA class IV on admit  5. Acute hypoxic respiratory failure. Resolved. 6. CKD stage IIIIV. At baseline. 7. Anemia. Chronic. Stable. 8. History of CVA  9. History of left carotid stenosis  10. Parkinson's disease  11. Anxiety/bipolar       PENDING TEST RESULTS:   At the time of discharge the following test results are still pending: none    FOLLOW UP APPOINTMENTS:    Follow-up Information     Follow up With Specialties Details Why Ernie Carrington MD Cardiology On 3/7/2022 9AM [address]  [address] [facility]  [phone]      [address] [place] [address] Alen Clemente DO Family Medicine In 1 week Hospital follow up [address] [address]  [address] [place] [address] 7 [id number]  [phone]             ADDITIONAL CARE RECOMMENDATIONS: none    DIET: Regular Diet    ACTIVITY: Activity as tolerated    WOUND CARE: none    EQUIPMENT needed: none      DISCHARGE MEDICATIONS:  Current Discharge Medication List      START taking these medications    Details   apixaban (ELIQUIS) 2.5 mg tablet Take 1 Tablet by mouth two (2) times a day. Qty: 30 Tablet, Refills: 0  Start date: 2/18/2022      aspirin 81 mg chewable tablet Take 1 Tablet by mouth every other day. Qty: 30 Tablet, Refills: 0  Start date: 2/19/2022         CONTINUE these medications which have CHANGED    Details   carvediloL (COREG) 12.5 mg tablet Take 1 Tablet by mouth two (2) times daily (with meals). Qty: 60 Tablet, Refills: 0  Start date: 2/18/2022      dilTIAZem IR (CARDIZEM) 60 mg tablet Take 1 Tablet by mouth Before breakfast, lunch, and dinner.   Qty: 90 Tablet, Refills: 0  Start date: 2/18/2022      DULoxetine (CYMBALTA) 60 mg capsule Take 2 Capsules by mouth daily. Indications: anxiousness associated with depression  Qty: 30 Capsule, Refills: 0  Start date: 2/19/2022      pantoprazole (PROTONIX) 40 mg tablet Take 1 Tablet by mouth Daily (before breakfast). Indications: h/o bleeding ulcer with nsaid  Qty: 30 Tablet, Refills: 0  Start date: 2/19/2022         CONTINUE these medications which have NOT CHANGED    Details   magnesium oxide (MAG-OX) 400 mg tablet Take 400 mg by mouth daily. polyethylene glycol (Miralax) 17 gram packet Take 17 g by mouth daily as needed for Constipation. potassium chloride (KLOR-CON) 20 mEq pack Take 20 mEq by mouth every other day. traZODone (DESYREL) 50 mg tablet Take 25 mg by mouth nightly. bumetanide (BUMEX) 1 mg tablet Take 1 Tablet by mouth two (2) times a day for 30 days. Qty: 60 Tablet, Refills: 0      ARIPiprazole (ABILIFY) 20 mg tablet Take 1 Tablet by mouth daily. Qty: 30 Tablet, Refills: 0      ondansetron (ZOFRAN ODT) 4 mg disintegrating tablet Take 1 Tablet by mouth every eight (8) hours as needed for Nausea or Vomiting. Qty: 30 Tablet, Refills: 0      cilostazoL (PLETAL) 100 mg tablet Take 100 mg by mouth Before breakfast and dinner. atorvastatin (LIPITOR) 40 mg tablet Take 1 Tablet by mouth nightly. Qty: 30 Tablet, Refills: 5      nitroglycerin (Nitrostat) 0.4 mg SL tablet 0.4 mg by SubLINGual route every five (5) minutes as needed for Chest Pain. Up to 3 doses. carbidopa-levodopa (SINEMET)  mg per tablet Take 2 Tabs by mouth four (4) times daily. Qty: 720 Tab, Refills: 1      acetaminophen (TYLENOL) 325 mg tablet Take 650 mg by mouth every six (6) hours as needed for Pain or Fever. cyanocobalamin (VITAMIN B12) 100 mcg tablet Take 100 mcg by mouth daily. Cholecalciferol, Vitamin D3, 1,000 unit cap Take 1,000 Units by mouth daily.       multivitamins-minerals-lutein (CENTRUM SILVER) tab tablet Take 1 Tablet by mouth daily. STOP taking these medications       omeprazole (PRILOSEC) 20 mg capsule Comments:   Reason for Stopping:         senna-docusate (Senna with Docusate Sodium) 8.6-50 mg per tablet Comments:   Reason for Stopping:         clopidogreL (Plavix) 75 mg tab Comments:   Reason for Stopping:                 NOTIFY YOUR PHYSICIAN FOR ANY OF THE FOLLOWING:   Fever over 101 degrees for 24 hours. Chest pain, shortness of breath, fever, chills, nausea, vomiting, diarrhea, change in mentation, falling, weakness, bleeding. Severe pain or pain not relieved by medications. Or, any other signs or symptoms that you may have questions about. DISPOSITION:    Home With:   OT  PT  HH  RN      X Long term SNF/Inpatient Rehab    Independent/assisted living    Hospice    Other:       PATIENT CONDITION AT DISCHARGE:     Functional status    Poor    X Deconditioned     Independent      Cognition     Lucid    X Forgetful     Dementia      Catheters/lines (plus indication)    Sue     PICC     PEG    X None      Code status     Full code    X DNR      PHYSICAL EXAMINATION AT DISCHARGE:  Constitutional:  No acute distress, cooperative, pleasant    ENT:  Oral mucosa moist, oropharynx benign. Resp:  Exp wheezing, no increased work of breathing. No accessory muscle use. Able to speak in full sentences, saturating 91-93%   CV:  Irregularly irregular, rate in 60's, no murmurs, gallops, rubs    GI:  Soft, non distended, non tender. normoactive bowel sounds, no hepatosplenomegaly     Musculoskeletal:  1+ edema, warm, 2+ pulses throughout    Neurologic:  Moves all extremities.   AAOx3, CN II-XII reviewed        CHRONIC MEDICAL DIAGNOSES:  Problem List as of 2/18/2022 Date Reviewed: 1/17/2022          Codes Class Noted - Resolved    Pulmonary edema ICD-10-CM: J81.1  ICD-9-CM: 806  1/16/2022 - Present        Atrial fibrillation with RVR ([facility] ICD-10-CM: I48.91  ICD-9-CM: 427.31  1/16/2022 - Present        Acute respiratory failure with hypoxia (HCC) ICD-10-CM: J96.01  ICD-9-CM: 518.81  1/16/2022 - Present        Acute on chronic systolic and diastolic heart failure, NYHA class 4 ([facility]) ICD-10-CM: I50.43  ICD-9-CM: 428.43  1/16/2022 - Present        CHF (congestive heart failure) ([facility]) ICD-10-CM: I50.9  ICD-9-CM: 428.0  1/8/2022 - Present        Left bundle branch block ICD-10-CM: I44.7  ICD-9-CM: 426.3  1/1/2022 - Present        CAP (community acquired pneumonia) ICD-10-CM: J18.9  ICD-9-CM: 486  12/10/2021 - Present        Acute on chronic respiratory failure with hypoxia (HCC) ICD-10-CM: J96.21  ICD-9-CM: 518.84, 799.02  12/10/2021 - Present        Cellulitis and abscess of lower extremity ICD-10-CM: L03.119, L02.419  ICD-9-CM: 682.6  10/29/2021 - Present        SOB (shortness of breath) ICD-10-CM: R06.02  ICD-9-CM: 786.05  4/23/2021 - Present        UTI (urinary tract infection) ICD-10-CM: N39.0  ICD-9-CM: 599.0  4/9/2021 - Present        Weakness ICD-10-CM: R53.1  ICD-9-CM: 780.79  5/4/2020 - Present        Generalized weakness ICD-10-CM: R53.1  ICD-9-CM: 780.79  4/30/2020 - Present        Carotid artery stenosis, symptomatic, left ICD-10-CM: G17.96  ICD-9-CM: 433.10  7/26/2019 - Present        HTN (hypertension) ICD-10-CM: I10  ICD-9-CM: 401.9  7/17/2019 - Present        Acute ischemic stroke ([facility] ICD-10-CM: I63.9  ICD-9-CM: 434.91  7/12/2019 - Present        Fall ICD-10-CM: Alize Burns. Briana Canes  ICD-9-CM: E888.9  12/24/2018 - Present        CKD (chronic kidney disease), stage III ([facility] ICD-10-CM: N18.30  ICD-9-CM: 585.3  7/8/2015 - Present        Edema ICD-10-CM: R60.9  ICD-9-CM: 782.3  5/6/2015 - Present        Diabetes mellitus ([facility] ICD-10-CM: E11.9  ICD-9-CM: 250.00  5/6/2015 - Present        Postoperative anemia due to acute blood loss ICD-10-CM: D62  ICD-9-CM: 285.1  2/14/2015 - Present        S/P CABG (coronary artery bypass graft) ICD-10-CM: Z95.1  ICD-9-CM: V45.81  2/13/2015 - Present        Syncope ICD-10-CM: R55  ICD-9-CM: 780.2  2/9/2015 - Present        Bradycardia ICD-10-CM: R00.1  ICD-9-CM: 427.89  2/9/2015 - Present        Acute kidney injury ([facility] ICD-10-CM: N17.9  ICD-9-CM: 584.9  2/9/2015 - Present        Anemia ICD-10-CM: D64.9  ICD-9-CM: 285.9  9/9/2014 - Present        GI bleed ICD-10-CM: K92.2  ICD-9-CM: 578.9  9/9/2014 - Present        AF (atrial fibrillation) (HCC) ICD-10-CM: I48.91  ICD-9-CM: 427.31  6/20/2014 - Present        Hypotension ICD-10-CM: I95.9  ICD-9-CM: 458.9  6/3/2014 - Present        Coronary artery disease ICD-10-CM: I25.10  ICD-9-CM: 414.00  6/3/2014 - Present    Overview Signed 2/10/2015 11:06 AM by Chemo Long     2007 PCI x2 to LAD with STEPHAN             S/P coronary artery stent placement ICD-10-CM: Z95.5  ICD-9-CM: V45.82  6/3/2014 - Present        Renal failure ICD-10-CM: N19  ICD-9-CM: 050  6/3/2014 - Present        Elevated troponin ICD-10-CM: R77.8  ICD-9-CM: 790.6  6/3/2014 - Present        Pericardial effusion ICD-10-CM: I31.3  ICD-9-CM: 423.9  6/3/2014 - Present        Lactic acidosis ICD-10-CM: E87.2  ICD-9-CM: 276.2  6/3/2014 - Present        RESOLVED: Rapid atrial fibrillation ([facility] ICD-10-CM: I48.91  ICD-9-CM: 427.31  2/11/2022 - 2/18/2022        RESOLVED: CHF exacerbation ([facility] ICD-10-CM: I50.9  ICD-9-CM: 428.0  1/15/2022 - 2/18/2022        RESOLVED: CHF exacerbation ([facility] ICD-10-CM: I50.9  ICD-9-CM: 428.0  9/25/2020 - 1/6/2022        RESOLVED: CHF (congestive heart failure) ([facility] ICD-10-CM: I50.9  ICD-9-CM: 428.0  9/22/2020 - 8/3/2021              Greater than 35 minutes were spent with the patient on counseling and coordination of care    Signed:   Nora Luque MD  2/18/2022  1:23 PM

## 2022-02-18 NOTE — PROGRESS NOTES
Problem: Mobility Impaired (Adult and Pediatric)  Goal: *Acute Goals and Plan of Care (Insert Text)  Description: FUNCTIONAL STATUS PRIOR TO ADMISSION: The patient was functional at the wheelchair level and reports that she required assist x 1 for transfers to the chair. HOME SUPPORT PRIOR TO ADMISSION: The patient lived at a SNF. Physical Therapy Goals  Initiated 2/14/2022  1. Patient will move from supine to sit and sit to supine , scoot up and down, and roll side to side in bed with minimal assistance/contact guard assist within 7 day(s). 2.  Patient will transfer from bed to chair and chair to bed with minimal assistance/contact guard assist using the least restrictive device within 7 day(s). 3.  Patient will perform sit to stand with minimal assistance/contact guard assist within 7 day(s). 4.  Patient will participate in LE ex program within 7 day(s). Outcome: Progressing Towards Goal   PHYSICAL THERAPY TREATMENT  Patient: Scot Parisi (74 y.o. female)  Date: 2/18/2022  Diagnosis: CHF exacerbation ([facility] [I50.9]  Rapid atrial fibrillation ([facility] [I48.91] <principal problem not specified>       Precautions: Fall,Contact  Chart, physical therapy assessment, plan of care and goals were reviewed. ASSESSMENT  Patient continues with skilled PT services and is progressing towards goals. Today pt made gains, sat at the EOB and even worked on sit <> stand and some amb. She continues to be limited by impaired balance, demonstrated good sitting balance at EOB but note altered center of gravity in standing and retropulsion. Standing is also limited by what appears to be slight bilateral knee flexion contractures. Pt was able to amb about one foot sidestepping along the EOB with bilateral hand held assist with heavy left lean. Deemed not yet safe for transfer to and from the chair (high fall risk and high potential of risk of injury to those assisting her), recommend use of rober for OOB.  Continue to recommend rehab at SNF level. .     Current Level of Function Impacting Discharge (mobility/balance): up to mod/max assist X 2    Other factors to consider for discharge: cardiac history and recent COVID         PLAN :  Patient continues to benefit from skilled intervention to address the above impairments. Continue treatment per established plan of care. to address goals. Recommendation for discharge: (in order for the patient to meet his/her long term goals)  Therapy up to 5 days/week in SNF setting    This discharge recommendation:  A follow-up discussion with the attending provider and/or case management is planned    IF patient discharges home will need the following DME: hospital bed, mechanical lift, wheelchair, and medical transport and 24/7 assist       SUBJECTIVE:   Patient stated Cynthia Lewis won't go straight, referring to her knees. OBJECTIVE DATA SUMMARY:   Chart checked, pt cleared by nursing  Critical Behavior:  Neurologic State: Alert  Orientation Level: Oriented to person,Oriented to place  Cognition: Impaired decision making  Safety/Judgement: Awareness of environment,Decreased insight into deficits  Functional Mobility Training:  Bed Mobility:  Rolling: Moderate assistance  Supine to Sit: Maximum assistance;Assist x2     Scooting: Moderate assistance        Transfers:  Sit to Stand: Moderate assistance;Maximum assistance;Assist x2  Stand to Sit: Moderate assistance                             Balance:  Sitting: Intact; Without support  Standing: Impaired; With support  Standing - Static: Constant support;Poor  Ambulation/Gait Training:  Distance (ft): 1 Feet (ft)  Assistive Device: Gait belt (bilateral hand held)  Ambulation - Level of Assistance: Assist x2; Moderate assistance;Maximum assistance        Gait Abnormalities: Decreased step clearance;Trunk sway increased        Base of Support: Center of gravity altered;Narrowed     Speed/Samira: Slow;Pace decreased (<100 feet/min); Delayed  Step Length: Left shortened;Right shortened                    Stairs: Therapeutic Exercises:     Pain Rating:  None rated    Activity Tolerance:   Good    After treatment patient left in no apparent distress:   Supine in bed, Heels elevated for pressure relief, Call bell within reach, Bed / chair alarm activated, Side rails x 3, and table set up for breakfast tr ay when it arrives    COMMUNICATION/COLLABORATION:   The patients plan of care was discussed with: Occupational therapist, Registered nurse, and Case management.      Andrew Bowman   Time Calculation: 23 mins

## 2022-02-18 NOTE — PROGRESS NOTES
Bedside shift change report given to Uday Leon and Saad Sanchez, RNs (oncoming nurse) by Shana Dougherty, RN (offgoing nurse). Report included the following information SBAR, Kardex, ED Summary, Intake/Output, MAR, Accordion, Recent Results, Med Rec Status and Cardiac Rhythm Afib.

## 2022-02-18 NOTE — PROGRESS NOTES
End of shift-   Pt had no acute events overnight. Medication administered per MAR, blood collected labeled at bedside then sent to the lab. Safety and skin protection measures continued. Will update oncoming nurse.

## 2022-02-18 NOTE — PROGRESS NOTES
ANDREA PLAN:  RUR-32%    [id number] return to [facility] for rehab vs LTC    [id number]-4-2 pm    F/U with PCP/Specialist    Auth denied. Patient will return to [facility] today per attending. CM  notified Evy Davis, awaiting room #. Attending to call [employer] at [phone], option 5 before 1:30 pm for a Peer to Peer. Dr. Eleanor Ba notified    Socrates Rios [place] [facility]    CM faxed updated PT/OT notes to [facility] at [phone]. Also, spoke with Carley Herculescruz regarding [place] Ref # [id number], she said they will need to have a peer to peer with the attending as patient was in LTC prior to admission. This is a standard protocol if patient is going from LTC to short term rehab.  has sent a [employer] message to Dr. Eleanor Ba to call [phone], option 5 before 1:30 pm today    Also spoke with Evy Davis at [facility], they will accept patient back to LTC if [place] is denied but they were obligated to offer rehab following hospitalization by law as long as the patient has insurance    CM also discuss patient with Dr. Eleanor Ba. . Awaiting a response for [employer]. Nicolasa Silva MSA, RN, CM    Transition of Care Plan to SNF/Rehab    SNF/Rehab Transition:  Patient has been accepted to [facility] and meets criteria for admission. Patient will transported by [facility] and expected to leave at  1:00 PM    Communication to Patient/Family:  Spoke with patient's niece, Amilcar Lee and they are agreeable to the transition plan. Communication to SNF/Rehab:  Bedside RN, Nikki Powers has been notified to update the transition plan to the facility and call report (phone number [phone], ask for [facility]  Discharge information has been updated on the AVS.     Discharge instructions to be downloaded from [place] [address] Please include all hard scripts for controlled substances, med rec and dc summary, and AVS in packet.      Reviewed and confirmed with facility, Elise with [facility], they can manage the patient care needs for the following:     SNF/Rehab Transition:    Kavin Montero with (X) only those applicable:    Medication:  [x]  Medications will be available at the facility  [x]  IV Antibiotics   []  Controlled Substance - hard copy to be sent with patient   [x]  Weekly Labs   Documents:  [] Hard RX  [] MAR  [] Kardex  [] AVS  []Transfer Summary  [x]Discharge   Equipment:  []  CPAP/BiPAP  []  Wound Vacuum  []  Sue or Urinary Device  [x]  PICC/Central Line  []  Nebulizer  []  Ventilator   Treatment:  Isolation for meningitis   []Surgical Drain Management  []Tracheostomy Care  []Dressing Changes  []Dialysis with transportation and chair time   []PEG Care  []Oxygen  []Daily Weights for Heart Failure   Dietary:  []Any diet limitations  []Tube Feedings   []Total Parenteral Management (TPN)   Eligible for Medicaid Long Term Services and Supports  Yes:  [] Eligible for medical assistance or will become eligible within 180 days and UAI completed. [] Provider/Patient and/or support system has requested screening. [] UAI copy provided to patient or responsible party,   [] UAI unavailable at discharge will send once processed to SNF provider. [] UAI unavailable at discharged mailed to patient  No:   [] Private pay and is not financially eligible for Medicaid within the next 180 days. [] Reside out-of-state.   [] A residents of a state owned/operated facility that is licensed  by [facility] or [facility]  [] Enrollment in [facility] hospice services  []  Medical [address]  [] Patient /Family declines to have screening completed or provide financial information for screening     Financial Resources:  Medicaid    [] Initiated and application pending   [] Full coverage     Advanced Care Plan:  []Surrogate Decision Maker of Care  []POA  [x]Communicated Code Status  (DDNR\", \"Full\")    Returning to LTC

## 2022-02-21 ENCOUNTER — PATIENT OUTREACH (OUTPATIENT)
Dept: CASE MANAGEMENT | Age: 82
End: 2022-02-21

## 2022-02-21 NOTE — PROGRESS NOTES
Patient discharged to [facility] per EMR. Transition of care outreach postponed for 14 days due to patient's discharge to SNF. Will continue to monitor patient progress.

## 2022-03-08 ENCOUNTER — PATIENT OUTREACH (OUTPATIENT)
Dept: CASE MANAGEMENT | Age: 82
End: 2022-03-08

## 2022-03-18 PROBLEM — J81.1 PULMONARY EDEMA: Status: ACTIVE | Noted: 2022-01-16

## 2022-03-18 PROBLEM — R53.1 GENERALIZED WEAKNESS: Status: ACTIVE | Noted: 2020-04-30

## 2022-03-18 PROBLEM — I10 HTN (HYPERTENSION): Status: ACTIVE | Noted: 2019-07-17

## 2022-03-18 PROBLEM — I50.43 ACUTE ON CHRONIC SYSTOLIC AND DIASTOLIC HEART FAILURE, NYHA CLASS 4 (HCC): Status: ACTIVE | Noted: 2022-01-16

## 2022-03-18 PROBLEM — I48.91 ATRIAL FIBRILLATION WITH RVR (HCC): Status: ACTIVE | Noted: 2022-01-16

## 2022-03-18 PROBLEM — R53.1 WEAKNESS: Status: ACTIVE | Noted: 2020-05-04

## 2022-03-18 PROBLEM — R06.02 SOB (SHORTNESS OF BREATH): Status: ACTIVE | Noted: 2021-04-23

## 2022-03-18 PROBLEM — N39.0 UTI (URINARY TRACT INFECTION): Status: ACTIVE | Noted: 2021-04-09

## 2022-03-19 PROBLEM — I50.9 CHF (CONGESTIVE HEART FAILURE) (HCC): Status: ACTIVE | Noted: 2022-01-08

## 2022-03-19 PROBLEM — I44.7 LEFT BUNDLE BRANCH BLOCK: Status: ACTIVE | Noted: 2022-01-01

## 2022-03-19 PROBLEM — J18.9 CAP (COMMUNITY ACQUIRED PNEUMONIA): Status: ACTIVE | Noted: 2021-12-10

## 2022-03-19 PROBLEM — J96.21 ACUTE ON CHRONIC RESPIRATORY FAILURE WITH HYPOXIA (HCC): Status: ACTIVE | Noted: 2021-12-10

## 2022-03-19 PROBLEM — I65.22 CAROTID ARTERY STENOSIS, SYMPTOMATIC, LEFT: Status: ACTIVE | Noted: 2019-07-26

## 2022-03-20 PROBLEM — J96.01 ACUTE RESPIRATORY FAILURE WITH HYPOXIA (HCC): Status: ACTIVE | Noted: 2022-01-16
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
## 2022-03-20 PROBLEM — I63.9 ACUTE ISCHEMIC STROKE (HCC): Status: ACTIVE | Noted: 2019-07-12

## 2022-03-20 PROBLEM — L03.119 CELLULITIS AND ABSCESS OF LOWER EXTREMITY: Status: ACTIVE | Noted: 2021-10-29

## 2022-03-20 PROBLEM — L02.419 CELLULITIS AND ABSCESS OF LOWER EXTREMITY: Status: ACTIVE | Noted: 2021-10-29

## 2022-03-22 ENCOUNTER — PATIENT OUTREACH (OUTPATIENT)
Dept: CASE MANAGEMENT | Age: 82
End: 2022-03-22

## 2022-03-22 NOTE — PROGRESS NOTES
Outgoing call made to [facility]. Introduced self,role,and verified patient. Spoke with Clarissa. Informed patient was still admitted to facility. Patient is 30 days post discharge. Writer will signoff.

## 2022-08-15 NOTE — PROGRESS NOTES
Cardiology Progress Note            Admit Date: 3/29/2021  Admit Diagnosis: CHF exacerbation ([address] [I50.9]  Date: 4/8/2021     Time: 8:44 AM    Subjective:   No further chest pain. Denies SOB. She reports no dyspnea or chest pain. Rt groin site without hematoma. She tells me she does not want me to make follow up appointment with office yet because she doesn't know her schedule. She wants to go home not to rehab- PT is recommending SNF vs intensive rehab at home. Assessment and Plan     1.. Acute systolic CHF/Cardiomyopathy, possibly ischemic: Echo from April 3rd personally reviewed by me. EF 50-55% when conduction normal and 35-40% with LBBB. -Reduce bumex  To 1  Mg po daily    -Hydralazine nitrate and BB will be continued in reduced doses   -May consider CRT in 3 months if EF still low but I suspect may improve   -NO LV THROMBUS   -Rt groin site with ecchymosis but no hematoma.   -No lifevest needed. May be discharged from cardiac standpoint. While PT recommends SNF vs intensive rehab for home, she has limited assistance at home and we feel would benefit from Subacute rehab for strengthening for the next couple of weeks. Current medical regimen appears to be adequate. Her blood pressure is better today. Will continue current low-dose regimen for HFrEF. This regimen includes Imdur, hydralazine, low-dose beta-blocker. Am Concerned for safety that she lives alone. She does not want us to make follow up with Dr. Sonu Valles- will ask office to contact her to schedule follow up appointment. Discussed importance of close follow up with Cardiology. 3. NSTEMI/CAD   -No further chest pain   -[facility] 4/21: Patent LIMA to LAD, VG to D1 to OM. S/p PCI/STEPHAN to LAD via LIMA. -% stenosed distal, prox 80%, mid 50%. Medical management.   -Continue ASA, Plavix, high intensity statin     4.  PAF: recurrent   -No further PAF on tele review   -Now NSR   -Some bradycardia with low dose coreg- stopped  but now tolerating- started 21 -Suspect some element of SSS   -[id number] vasc= at least 8.not on [address] PTA. Anemia of concern. 5. HTN :    -controlled   -On amlodipine, hydralazine, coreg     6. MORENO on CKD    -Creatinine trending up slightly today, 1.45 but at baseline   -Nephrology following   - Bilateral renal angiogram  does not show critical disease in either renal arteries, left renal artery 50-70% and right renal artery close to 40-50% stenosis    7. Anemia, normocytic   -acute blood loss  on chronic (ckd)   -Hgb stable s/p PRBC tx on      8. CAD              -s/p CABG x 3 ,  Stent               -Coreg, Lipitor,, ASA- stop Plavix. -Nuclear stress 2020: No ischemia, possile small distal anterior infarct EF 53%    9. MR              - Moderate- severe by repeat echo, likely functional in nature may improve with cardiac resynchronization therapy. 10. PHTN              - Severe by echo, PAS 85 mmHg     11. Infra renal abdominal aortic aneurysm   -4 cm by US   -monitor. Other comorbids:  13. Carotid dz              - s/p CEA on left              - Plavix, Lipitor. 14. HLD:   - Lipitor   15. DM              - A1C 5.7       16. H/o CVA              - left lacunar infarct at head of caudate       [facility]. SUAD Sue 2021 1000 AM      Cardiac testin21   ECHO ADULT COMPLETE 2021 3/31/2021    Narrative · LV: Estimated LVEF is 25 - 30%. Normal wall thickness. Mildly dilated   left ventricle. Severely reduced systolic function. Abnormal left   ventricular septal motion consistent with left bundle branch block. Small   possible thrombus present in the left ventricle. Thrombus is located in   the apex. · MV: Severe mitral annular calcification. Moderate to severe mitral valve   regurgitation is present. · LA: Severely dilated left atrium.   · TV: Moderate to severe tricuspid valve regurgitation is present. · PA: Severe pulmonary hypertension. Pulmonary arterial systolic pressure   is 85 mmHg. · RA: Mildly dilated right atrium. · AV: Mild to moderate aortic valve regurgitation is present. Signed by: Edouard Colunga MD     03/29/21   CARDIAC PROCEDURE 04/01/2021 4/1/2021    Narrative Findings:  1)Severe antive 3 vessel CAD  2)Patetn LIMA to LAD, VG to d1 to OM  3. Occluded native RCA with rPDA collateralized from left system. All   arteries are heavily calcified  4. Native distal LAD with 90% stenosis  5. S/p PCI of native distal LAS with 2.25 by 18 mm Xinece STEPHAN through LIMA   graft  6. Normal LVEDP    Access  Left radial: Severe calcification of subclavian. Tortuous    Contrast 41 cc    Recommendations  1)Plavix based DPAT. 2. Interval renal angiogram in On Monday if she is still here--otherwise   as outpatient. Not done to day to conserve contrast.  3. GDMT for CAD  4. Medical mgm for RCA disease.      Signed by: Mina Grullon MD                  [facility]  Past Medical History:   Diagnosis Date    Anxiety disorder     Atrial fibrillation ([address]     CAD (coronary artery disease) 2007    stents, CABG x 3v    Carotid stenosis     Cervical stenosis of spinal canal 07/2019    Chronic kidney disease     Cough     CVA (cerebral vascular accident) ([address] 07/2019    left lacunar infarct at head of caudate    Depression     AND CHRONIC ANXIETY    Diabetes ([address]     GERD (gastroesophageal reflux disease)     High cholesterol     History of peptic ulcer     Bleeding ulcer with increased NSAID use    Hypertension     Left carotid stenosis 07/2019    s/p left CEA with Dr. Olga Bolton, old 2007    PUD (peptic ulcer disease)     Stroke ([address]     Tremor     Valvular heart disease       Social Hx  Social History     Socioeconomic History    Marital status: SINGLE     Spouse name: Not on file    Number of children: Not on file    Years of education: Not on file    Highest education level: Not on file   Occupational History    Occupation: Retired realestate/teacher   Social Needs    Financial resource strain: Not on file    Food insecurity     Worry: Not on file     Inability: Not on file   [employer] needs     Medical: Not on file     Non-medical: Not on file   Tobacco Use    Smoking status: Former Smoker     Packs/day: 0.25     Years: 5.00     Pack years: 1.25     Types: Cigarettes     Quit date:      Years since quittin.3    Smokeless tobacco: Never Used   Substance and Sexual Activity    Alcohol use: Yes     Alcohol/week: 0.0 standard drinks     Comment: Rare    Drug use: No    Sexual activity: Not on file   Lifestyle    Physical activity     Days per week: Not on file     Minutes per session: Not on file    Stress: Not on file   Relationships    Social connections     Talks on phone: Not on file     Gets together: Not on file     Attends [employer] service: Not on file     Active member of club or organization: Not on file     Attends meetings of clubs or organizations: Not on file     Relationship status: Not on file    Intimate partner violence     Fear of current or ex partner: Not on file     Emotionally abused: Not on file     Physically abused: Not on file     Forced sexual activity: Not on file   Other Topics Concern    Not on file   Social History Narrative    Lives in [facility]       Objective:   Physical Exam:                Visit Vitals  /68 (BP 1 Location: Left upper arm, BP Patient Position: At rest)   Pulse 73   Temp 98.4 °F (36.9 °C)   Resp 18   Ht 5' 5\" (1.651 m)   Wt 159 lb 4.8 oz (72.3 kg)   SpO2 93%   BMI 26.51 kg/m²          General Appearance:   Well developed, pale, alert and oriented x 3, and   individual in no acute distress. Ears/Nose/Mouth/Throat:    Hearing grossly normal.         Neck:  Supple. Chest:    Lungs with left basilar crackles,   Cardiovascular:  Regular rate and rhythm, S1, S2 normal, no murmur.    Abdomen:    Soft, non-tender, bowel sounds are active. Extremities:  no edema bilaterally. Skin:  Warm and dry. Pale. Telemetry: NSR with BBB currently. No PAF            Data Review:    Labs:    Recent Results (from the past 24 hour(s))   RENAL FUNCTION PANEL    Collection Time: 04/08/21  1:44 AM   Result Value Ref Range    Sodium 138 136 - 145 mmol/L    Potassium 4.4 3.5 - 5.1 mmol/L    Chloride 107 97 - 108 mmol/L    CO2 21 21 - 32 mmol/L    Anion gap 10 5 - 15 mmol/L    Glucose 102 (H) 65 - 100 mg/dL    BUN 25 (H) 6 - 20 MG/DL    Creatinine 1.47 (H) 0.55 - 1.02 MG/DL    BUN/Creatinine ratio 17 12 - 20      GFR est AA 42 (L) >60 ml/min/1.73m2    GFR est non-AA 34 (L) >60 ml/min/1.73m2    Calcium 9.1 8.5 - 10.1 MG/DL    Phosphorus 3.7 2.6 - 4.7 MG/DL    Albumin 2.9 (L) 3.5 - 5.0 g/dL   CBC WITH AUTOMATED DIFF    Collection Time: 04/08/21  1:44 AM   Result Value Ref Range    WBC 7.7 3.6 - 11.0 K/uL    RBC 2.71 (L) 3.80 - 5.20 M/uL    HGB 8.3 (L) 11.5 - 16.0 g/dL    HCT 25.7 (L) 35.0 - 47.0 %    MCV 94.8 80.0 - 99.0 FL    MCH 30.6 26.0 - 34.0 PG    MCHC 32.3 30.0 - 36.5 g/dL    RDW 18.2 (H) 11.5 - 14.5 %    PLATELET 205 184 - 106 K/uL    MPV 8.8 (L) 8.9 - 12.9 FL    NRBC 0.0 0  WBC    ABSOLUTE NRBC 0.00 0.00 - 0.01 K/uL    NEUTROPHILS 65 32 - 75 %    LYMPHOCYTES 15 12 - 49 %    MONOCYTES 12 5 - 13 %    EOSINOPHILS 7 0 - 7 %    BASOPHILS 1 0 - 1 %    IMMATURE GRANULOCYTES 0 0.0 - 0.5 %    ABS. NEUTROPHILS 5.1 1.8 - 8.0 K/UL    ABS. LYMPHOCYTES 1.1 0.8 - 3.5 K/UL    ABS. MONOCYTES 0.9 0.0 - 1.0 K/UL    ABS. EOSINOPHILS 0.5 (H) 0.0 - 0.4 K/UL    ABS. BASOPHILS 0.1 0.0 - 0.1 K/UL    ABS. IMM.  GRANS. 0.0 0.00 - 0.04 K/UL    DF AUTOMATED            Radiology:        Current Facility-Administered Medications   Medication Dose Route Frequency    melatonin tablet 3 mg  3 mg Oral QHS PRN    traMADoL (ULTRAM) tablet 50 mg  50 mg Oral Q6H PRN    carvediloL (COREG) tablet 3.125 mg  3.125 mg Oral BID WITH MEALS    hydrALAZINE (APRESOLINE) tablet 25 mg  25 mg Oral TID    bumetanide (BUMEX) tablet 1 mg  1 mg Oral DAILY    polyethylene glycol (MIRALAX) packet 17 g  17 g Oral DAILY    0.9% sodium chloride infusion 250 mL  250 mL IntraVENous PRN    isosorbide mononitrate ER (IMDUR) tablet 30 mg  30 mg Oral DAILY    zolpidem (AMBIEN) tablet 5 mg  5 mg Oral QHS PRN    meropenem (MERREM) 500 mg in 0.9% sodium chloride (MBP/ADV) 50 mL MBP  0.5 g IntraVENous Q8H    sodium chloride (NS) flush 5-40 mL  5-40 mL IntraVENous PRN    sodium chloride (NS) flush 5-40 mL  5-40 mL IntraVENous Q8H    sodium chloride (NS) flush 5-40 mL  5-40 mL IntraVENous PRN    acetaminophen (TYLENOL) tablet 650 mg  650 mg Oral Q6H PRN    Or    acetaminophen (TYLENOL) suppository 650 mg  650 mg Rectal Q6H PRN    promethazine (PHENERGAN) tablet 12.5 mg  12.5 mg Oral Q6H PRN    Or    ondansetron (ZOFRAN) injection 4 mg  4 mg IntraVENous Q6H PRN    aspirin delayed-release tablet 81 mg  81 mg Oral QHS    atorvastatin (LIPITOR) tablet 40 mg  40 mg Oral QHS    carbidopa-levodopa (SINEMET)  mg per tablet 2 Tab  2 Tab Oral QID    clopidogreL (PLAVIX) tablet 75 mg  75 mg Oral DAILY AFTER BREAKFAST    DULoxetine (CYMBALTA) capsule 120 mg  120 mg Oral DAILY    pantoprazole (PROTONIX) tablet 40 mg  40 mg Oral ACB    hydrALAZINE (APRESOLINE) 20 mg/mL injection 20 mg  20 mg IntraVENous Q6H PRN       MD Zuhair Lua. SUAD Sue     I concur with the above note, findings and assessment. She seems to be feeling slightly better in terms of her breathing and does not report any further chest discomfort. /68 (BP 1 Location: Left upper arm, BP Patient Position: At rest)   Pulse 73   Temp 98.4 °F (36.9 °C)   Resp 18   Ht 5' 5\" (1.651 m)   Wt 159 lb 4.8 oz (72.3 kg)   SpO2 93%   BMI 26.51 kg/m²   General:    Alert, cooperative, no distress. Psychiatric:    Normal Mood and affect    Eye/ENT:      Pupils equal, No asymmetry, Conjunctival pink. Able to hear voice at normal amplitude   Lungs:      Visibly symmetric chest expansion, No palpable tenderness. Basal crackls    Heart[de-identified]    Regular rate and rhythm, S1, S2 normal, no murmur, click, rub or gallop. No JVD, Normal palpable peripheral pulses. No cyanosis   Abdomen:     Soft, non-tender. Bowel sounds normal. No masses,  No      organomegaly. Extremities:   Extremities normal, atraumatic, no edema. Neurologic:   CN II-XII grossly intact. No gross focal deficits       Patient was seen with NP/PA, recent investigations were reviewed and treatment care/plan was formulated together. There was a concern on echocardiogram regarding possible LV thrombus. We will perform definitive study with with echocardiogram.  She will require more assistance than she currently has at home. May require anesthesia care during her discharge. If she is here through the weekend, will perform renal angiogram on Monday. She will definitively require diuretic therapy given dilated IVC on her echocardiogram and persistent clinical evidence of congestion. Particularly given underlying functional mitral and tricuspid regurgitation, lack of diuretics on board will put her at high risk of repeat. Reviewed her echocardiogram demonstrates significant dyssynchrony of the septum due to underlying left bundle branch block.   She may benefit from cardiac resynchronization therapy long-term but given recent PCI will have to wait for 3 months before she can receive CRT  Salvador Romero MD  4/8/2021  11:43 AM             [facility] [address] [place] [address],[address]   [email or web address]   [phone] Text: The above diagnosis and findings were noted on the exam and discussed with the patient, who declines treatment due to the lesions being asymptomatic. Detail Level: Zone

## 2022-12-16 NOTE — PROGRESS NOTES
Outgoing call made to [facility]. Spoke with . Introduced self and role. Verified patient currently admitted to facility. Transition of care outreach postponed for 14 days due to patient's discharge to SNF.
show

## 2023-08-07 NOTE — DISCHARGE INSTRUCTIONS
Discharge Instructions       PATIENT ID: Melody Suarez  MRN: [id number]   YOB: 1940    DATE OF ADMISSION: 7/11/2019  9:09 AM    DATE OF DISCHARGE: 7/17/2019    PRIMARY CARE PROVIDER: Lawrence Byrd DO     ATTENDING PHYSICIAN: Zhao Starr*  DISCHARGING PROVIDER: Nicolette Rangel MD    To contact this individual call [phone] and ask the  to page. If unavailable ask to be transferred the Adult Hospitalist Department. DISCHARGE DIAGNOSES   Acute L basal ganglia CVA  Critical L ICA stenosis - 80%, surgery planned for next week. HTN    CONSULTATIONS: ED CONSULT TO SENIOR SERVICES PHYSICAL THERAPY  ED CONSULT TO SENIOR SERVICES CASE MANAGEMENT  ED CONSULT TO [facility]  ED CONSULT TO [address] [place] [address]  ED CONSULT TO SENIOR SERVICES PHYSICAL THERAPY  IP CONSULT TO NEUROLOGY  IP CONSULT TO HOSPITALIST  IP CONSULT TO CARDIOLOGY  IP CONSULT TO VASCULAR SURGERY  IP CONSULT TO NEUROSURGERY    PROCEDURES/SURGERIES: * No surgery found *    PENDING TEST RESULTS:   At the time of discharge the following test results are still pending: None    FOLLOW UP APPOINTMENTS:   Follow-up Information     Follow up With Specialties Details Why Contact Info    [facility]  you will be seen Thrusday 7/18/19  [address]  1st [facility] [address] Warner [address]    Lawrence Byrd DO Family Practice In 1 week  [address]      Bindu Erazo MD Vascular Surgery In 1 week The office should contact you to schedule vascular carotid surgery next week. If you do not hear from them, please give them a call. [address] [place] [address], S..  [phone]             ADDITIONAL CARE RECOMMENDATIONS:   1. Please take all medications as prescribed. Note changes as below.    2. Please make sure to follow up with your primary care physician within 1-2 weeks of discharge for hospital follow up. You will need to come back for vascular surgery next week. Dr. Eugene Villegas office should contact you regarding scheduling this. 3 .Avoid tobacco, alcohol and other illicit drug use. DIET: Cardiac Diet    ACTIVITY: PT/OT Eval and treat    WOUND CARE: None    EQUIPMENT needed: Per PT/OT      DISCHARGE MEDICATIONS:   See Medication Reconciliation Form    · It is important that you take the medication exactly as they are prescribed. · Keep your medication in the bottles provided by the pharmacist and keep a list of the medication names, dosages, and times to be taken in your wallet. · Do not take other medications without consulting your doctor. NOTIFY YOUR PHYSICIAN FOR ANY OF THE FOLLOWING:   Fever over 101 degrees for 24 hours. Chest pain, shortness of breath, fever, chills, nausea, vomiting, diarrhea, change in mentation, falling, weakness, bleeding. Severe pain or pain not relieved by medications. Or, any other signs or symptoms that you may have questions about. DISPOSITION:    Home With:   OT  PT  HH  RN      X SNF/Inpatient Rehab/LTAC    Independent/assisted living    Hospice    Other:     CDMP Checked: Yes X     PROBLEM LIST Updated:   Yes X       Signed:   Barbara Carballo MD  7/17/2019  3:40 PM Quality 137: Melanoma: Continuity Of Care - Recall System: Patient information entered into a recall system that includes: target date for the next exam specified AND a process to follow up with patients regarding missed or unscheduled appointments Detail Level: Detailed

## 2023-12-20 NOTE — PROGRESS NOTES
Continued Stay SW/CM Assessment/Plan of Care Note       Active Substitute Decision Maker (SDM)    There are no active Substitute Decision Maker (SDM) on file.           Progress note:  Hyponatremia/abd pain. Rhythm Sinus. On RA. IVF and IV Abx. CIWA.  Na+ 131 today. Serial labs. PO prednisone. Regular diet w/ Fluid restriction.  IR paracentesis 12/18 for 3.2 L removal. Chemical dependency following.     Hx of daily ETOH abuse. From home, lives w/ spouse, independent, ambulates unassisted, drives. Pts plan is home, anticipate no needs.  [facility] will be available to provide transport home at [place].     Medically cleared for transfer to tele- [facility] available bed      See SW/CM flowsheets for other objective data.    Disposition Recommendations:  Preliminary discharge destination: Planned Discharge Destination: Home/apartment with Spouse/SO  ANDRAE/CM recommendation for discharge: Home    Discharge Plan/Needs:     Continued Care and Services - Admitted Since 12/17/2023    Coordination has not been started for this encounter.           Devices/ Equipment that need to be arranged for discharge:     Accepted   Pending insurance authorization   Others:    Anticipated date of DME availability:     Prior To Hospitalization:    Living Situation: Significant other and residing at House    .  Support Systems: Family members   Home Devices/Equipment: None            Mobility Assist Devices: None   Type of Service Prior to Hospitalization: None               Patient/Family discharge goal (s):  Home     Resources provided:           Therapy Recommendations for Discharge:   PT:      Last Filed Values       None          OT:       Last Filed Values       None          SLP:    Last Filed Values       None            Mobility Equipment Recommended for Discharge:        Barriers to Discharge  Identified Barriers to Discharge/Transition Planning:                     Ewelina Ewing [address]  [address] [address] [address], [address]  [phone]                     GI PROGRESS NOTE    Patient Name: Gaetana Pallas      : 1940      MRN: [id number]  Admit Date: 12/10/2021  Today's Date: 2021    Subjective:   Awake asking for water. Per night RN, pt luz marina all prep by 2300. Last BMs all liquid but still brown. Helped RN clean pt during rounds - only urine output in pad at that time    Objective:     Blood pressure (!) 172/71, pulse (!) 58, temperature 98.3 °F (36.8 °C), resp. rate 15, weight 75.8 kg (167 lb 1.7 oz), SpO2 92 %. Physical Exam:  General appearance: cooperative, no distress  Skin: Pale No jaundice,  rashes   HEENT: Sclerae anicteric. Cardiovascular: Regular rate and rhythm. Shaaron Money Respiratory: Comfortable breathing . Clear breath sounds   GI: Abdomen nondistended, soft, and nontender. Normal active bowel sounds. Rectal: Deferred   Musculoskeletal: No pitting edema of the lower legs. Neurological: Awake pleasant conversable. Memory deficit  - limited historian  Psychiatric: Mood appropriate with good judgement. No anxiety or agitation      Data Review:    Recent Results (from the past 24 hour(s))   CBC WITH AUTOMATED DIFF    Collection Time: 21  4:33 AM   Result Value Ref Range    WBC 5.1 3.6 - 11.0 K/uL    RBC 2.70 (L) 3.80 - 5.20 M/uL    HGB 8.5 (L) 11.5 - 16.0 g/dL    HCT 26.6 (L) 35.0 - 47.0 %    MCV 98.5 80.0 - 99.0 FL    MCH 31.5 26.0 - 34.0 PG    MCHC 32.0 30.0 - 36.5 g/dL    RDW 14.6 (H) 11.5 - 14.5 %    PLATELET 059 394 - 139 K/uL    MPV 8.9 8.9 - 12.9 FL    NRBC 0.0 0  WBC    ABSOLUTE NRBC 0.00 0.00 - 0.01 K/uL    NEUTROPHILS 68 32 - 75 %    LYMPHOCYTES 17 12 - 49 %    MONOCYTES 10 5 - 13 %    EOSINOPHILS 4 0 - 7 %    BASOPHILS 1 0 - 1 %    IMMATURE GRANULOCYTES 0 0.0 - 0.5 %    ABS. NEUTROPHILS 3.5 1.8 - 8.0 K/UL    ABS. LYMPHOCYTES 0.9 0.8 - 3.5 K/UL    ABS. MONOCYTES 0.5 0.0 - 1.0 K/UL    ABS.  EOSINOPHILS 0.2 0.0 - 0.4 K/UL    ABS. BASOPHILS 0.0 0.0 - 0.1 K/UL    ABS. IMM. GRANS. 0.0 0.00 - 0.04 K/UL    DF AUTOMATED     METABOLIC PANEL, COMPREHENSIVE    Collection Time: 12/17/21  4:33 AM   Result Value Ref Range    Sodium 140 136 - 145 mmol/L    Potassium 3.6 3.5 - 5.1 mmol/L    Chloride 107 97 - 108 mmol/L    CO2 27 21 - 32 mmol/L    Anion gap 6 5 - 15 mmol/L    Glucose 110 (H) 65 - 100 mg/dL    BUN 24 (H) 6 - 20 MG/DL    Creatinine 1.26 (H) 0.55 - 1.02 MG/DL    BUN/Creatinine ratio 19 12 - 20      GFR est AA 50 (L) >60 ml/min/1.73m2    GFR est non-AA 41 (L) >60 ml/min/1.73m2    Calcium 8.9 8.5 - 10.1 MG/DL    Bilirubin, total 0.5 0.2 - 1.0 MG/DL    ALT (SGPT) 7 (L) 12 - 78 U/L    AST (SGOT) 20 15 - 37 U/L    Alk. phosphatase 85 45 - 117 U/L    Protein, total 5.7 (L) 6.4 - 8.2 g/dL    Albumin 3.2 (L) 3.5 - 5.0 g/dL    Globulin 2.5 2.0 - 4.0 g/dL    A-G Ratio 1.3 1.1 - 2.2           Assessment / Plan :     Anemia  H/o duodenal ulcer (NSAID related)   We are asked to see Mrs. Frazier for acute on chronic anemia with downtrending hemoglobin since admission. The patient is not a reliable historian and per nursing, she has not had any melena or hematochezia. She does have history of duodenal bulb ulcer actively bleeding at time of the of EGD in 2014, likely related to frequent NSAID use. It appears she has been on PPI since this time. She is on chronic anticoagulation with Eliquis  Despite no obvious signs of active bleeding, given her history will consider EGD for evaluation of recurrent ulcer exacerbated by anticoagulation. Would be most ideal patient off Eliquis for 2-3 days prior to the procedure. No labs today - hgb 8.5; 7.7 12/15  Output not entirely clear per RN.  Will proceed    - EGD/Colon today  - Hold Eliquis  - NPO  - Continue to monitor hemoglobin, transfuse for< 7  - Continue PPI       Patient Active Hospital Problem List:   Active Problems:    Elevated troponin (6/3/2014)      CAP (community acquired pneumonia) (12/10/2021)      Acute on chronic respiratory failure with hypoxia ([facility] (12/10/2021)      Evens Lynne NP    I have personally reviewed the history and independently examined the patient. I have reviewed the chart and agree with the documentation recorded by the Mid Level Provider, including the assessment, treatment plan, and disposition.     Bianca Bernard MD

## 2023-12-22 NOTE — PROGRESS NOTES
Hospitalist Progress Note  Renetta Gomez MD  Answering service: [phone] from in house phone        Date of Service:  2020  NAME:  Kristin Rubin  :  1940  MRN:  [id number]      Admission Summary:   As per initial admission summary  Teresa Mendez is a 79 y.o.  female who has a history of hypertension, CVA, carotid stenosis presents with lower extremity swelling and generalized weakness. She also reports that she was unable to walk due to swelling in the legs. She reports this was insidious in onset and gradually progressing. She denies any falls. She reports that she lives alone at home and is unable to take care of regular activities of daily living. She presents to the emergency room and noted to have swelling in the lower extremities and referred for admission. Currently patient denies chest pain shortness breath nausea vomiting or diarrhea.       We were asked to admit for work up and evaluation of the above problems        Interval history / Subjective:     Patient seen for Follow up of edema  No event reported. Awaiting placement    Cr coming down     Assessment & Plan:     # Lower extremity edema likely secondary to acute on chronic diastolic heart failure exacerbation  - Duplex lower extremities negative for DVT. - s/p IV lasix, now off due to elevated creatinine.    - strict I & o, daily weight   - Cardiology following--  Recommend she be discharge on Lasix 20 mg PO daily      # Macular rash lower extremities-- improving   - Possible cellulitis of lower extremities  - Completed IVF Ancef  - Duplex negative for DVT     # Hypertension accelerated  - ct with Coreg     # Generalized weakness  - With underlying history of Parkinson's  - PT/OT evaluation  - Prior work-up on the last admission negative for CVA.     # Parkinson disease  - Continue Sinemet     # History of coronary disease  - Continue with Pharmacy called back and he would like the metamucil fiber packets instead of gummies. Please call pharmacy to place the order [phone] any pharmacist can help you   aspirin Plavix     # CKD stage II   - monitor and avoid nephrotoxins     # Chronic A. Fib  - Currently normal sinus rhythm. - Not on anticoagulation per determination on previous admissions possibly secondary to risk of falls     # History of chronic carotid artery stenosis  - Continue with Plavix     # Hyperlipidemia  - Continue statin     # Anemia of chronic disease  - Monitor hemoglobin    # Left heel pain possible plantar fasciitis   - pain control, PT    Code status: Full code   DVT prophylaxis: enoxaprin    Care Plan discussed with: Patient/Family  Disposition: SNF     Hospital Problems  Date Reviewed: 5/1/2020          Codes Class Noted POA    CHF exacerbation ([facility] [address] ICD-10-CM: I50.9  ICD-9-CM: 428.0  9/25/2020 Unknown        CHF (congestive heart failure) ([facility] [address] ICD-10-CM: I50.9  ICD-9-CM: 428.0  9/22/2020 Unknown                Review of Systems:   A comprehensive review of systems was negative except for that written in the HPI. Vital Signs:    Last 24hrs VS reviewed since prior progress note. Most recent are:  Visit Vitals  BP (!) 127/56 (BP 1 Location: Left arm, BP Patient Position: At rest;Sitting)   Pulse (!) 57   Temp 98.4 °F (36.9 °C)   Resp 18   Ht 5' 5\" (1.651 m)   Wt 72.9 kg (160 lb 12.8 oz)   SpO2 96%   BMI 26.76 kg/m²         Intake/Output Summary (Last 24 hours) at 9/30/2020 1701  Last data filed at 9/30/2020 0610  Gross per 24 hour   Intake    Output 600 ml   Net -600 ml        Physical Examination:             Constitutional:  No acute distress,    ENT:  Oral mucous moist, oropharynx benign. Neck supple,    Resp:  CTA bilaterally. No wheezing/rhonchi/rales. No accessory muscle use   CV:  Regular rhythm, normal rate, no murmurs, gallops, rubs    GI:  Soft, non distended, non tender. normoactive bowel sounds, no hepatosplenomegaly     Musculoskeletal:  positive edema, warm, 2+ pulses throughout,     Neurologic:  Moves all extremities.   AAOx3, CN II-XII reviewed  Psy: she seems agitated Data Review:   I personally reviewed labs and imaging       Labs:     No results for input(s): WBC, HGB, HCT, PLT, HGBEXT, HCTEXT, PLTEXT, HGBEXT, HCTEXT, PLTEXT in the last 72 hours. Recent Labs     09/29/20  0236      K 4.2      CO2 27   BUN 36*   CREA 1.43*   *   CA 9.8     No results for input(s): ALT, AP, TBIL, TBILI, TP, ALB, GLOB, GGT, AML, LPSE in the last 72 hours. No lab exists for component: SGOT, GPT, AMYP, HLPSE  No results for input(s): INR, PTP, APTT, INREXT, INREXT in the last 72 hours. No results for input(s): FE, TIBC, PSAT, FERR in the last 72 hours. No results found for: FOL, RBCF   No results for input(s): PH, PCO2, PO2 in the last 72 hours. No results for input(s): CPK, CKNDX, TROIQ in the last 72 hours.     No lab exists for component: CPKMB  Lab Results   Component Value Date/Time    Cholesterol, total 148 09/23/2020 04:27 AM    HDL Cholesterol 52 09/23/2020 04:27 AM    LDL, calculated 83.8 09/23/2020 04:27 AM    Triglyceride 61 09/23/2020 04:27 AM    CHOL/HDL Ratio 2.8 09/23/2020 04:27 AM     Lab Results   Component Value Date/Time    Glucose (POC) 122 (H) 09/24/2020 09:35 PM    Glucose (POC) 132 (H) 09/24/2020 04:20 PM    Glucose (POC) 118 (H) 05/11/2020 11:35 AM    Glucose (POC) 139 (H) 07/26/2019 04:34 PM    Glucose (POC) 126 (H) 07/26/2019 11:01 AM     Lab Results   Component Value Date/Time    Color YELLOW/STRAW 09/22/2020 06:12 PM    Appearance CLEAR 09/22/2020 06:12 PM    Specific gravity 1.009 09/22/2020 06:12 PM    pH (UA) 7.0 09/22/2020 06:12 PM    Protein 100 (A) 09/22/2020 06:12 PM    Glucose Negative 09/22/2020 06:12 PM    Ketone Negative 09/22/2020 06:12 PM    Bilirubin Negative 09/22/2020 06:12 PM    Urobilinogen 0.2 09/22/2020 06:12 PM    Nitrites Negative 09/22/2020 06:12 PM    Leukocyte Esterase TRACE (A) 09/22/2020 06:12 PM    Epithelial cells FEW 09/22/2020 06:12 PM    Bacteria Negative 09/22/2020 06:12 PM    WBC 0-4 09/22/2020 06:12 PM    RBC 0-5 09/22/2020 06:12 PM         Medications Reviewed:     Current Facility-Administered Medications   Medication Dose Route Frequency    [START ON 10/1/2020] enoxaparin (LOVENOX) injection 40 mg  40 mg SubCUTAneous DAILY    hydrALAZINE (APRESOLINE) tablet 37.5 mg  37.5 mg Oral TID    hydrOXYzine HCL (ATARAX) tablet 25 mg  25 mg Oral TID PRN    HYDROcodone-acetaminophen (NORCO) 7.5-325 mg per tablet 1 Tab  1 Tab Oral Q4H PRN    carvediloL (COREG) tablet 3.125 mg  3.125 mg Oral BID WITH MEALS    docusate sodium (COLACE) capsule 100 mg  100 mg Oral PRN    aspirin delayed-release tablet 81 mg  81 mg Oral DAILY    sodium chloride (NS) flush 5-40 mL  5-40 mL IntraVENous Q8H    sodium chloride (NS) flush 5-40 mL  5-40 mL IntraVENous PRN    acetaminophen (TYLENOL) tablet 650 mg  650 mg Oral Q6H PRN    Or    acetaminophen (TYLENOL) suppository 650 mg  650 mg Rectal Q6H PRN    polyethylene glycol (MIRALAX) packet 17 g  17 g Oral DAILY PRN    promethazine (PHENERGAN) tablet 12.5 mg  12.5 mg Oral Q6H PRN    Or    ondansetron (ZOFRAN) injection 4 mg  4 mg IntraVENous Q6H PRN    carbidopa-levodopa (SINEMET)  mg per tablet 2 Tab  2 Tab Oral QID    atorvastatin (LIPITOR) tablet 40 mg  40 mg Oral QHS    clopidogreL (PLAVIX) tablet 75 mg  75 mg Oral DAILY AFTER BREAKFAST    DULoxetine (CYMBALTA) capsule 120 mg  120 mg Oral DAILY    hydrALAZINE (APRESOLINE) 20 mg/mL injection 10 mg  10 mg IntraVENous Q6H PRN    famotidine (PEPCID) tablet 20 mg  20 mg Oral DAILY    amLODIPine (NORVASC) tablet 5 mg  5 mg Oral DAILY     ______________________________________________________________________  EXPECTED LENGTH OF STAY: 4d 2h  ACTUAL LENGTH OF STAY:          5                 Tonya Cedeño MD

## 2025-06-23 NOTE — PROGRESS NOTES
04/30/20   NUCLEAR CARDIAC STRESS TEST 05/05/2020, 05/05/2020 5/5/2020    Narrative · Baseline ECG: Normal sinus rhythm, non-specific ST-T wave abnormalities. · NM: No ischemia. Possible small distal anterior infarct. LVEF 53%. Rest and Lexiscan myocardial perfusion SPECT imaging is performed with IV   injections of 10.6 and 32.9 mCi technetium 99m Sestamibi respectively. SPECT images displayed in three planes show no ischemic reversibility. There is a small focus of distal anterior wall fixed thinning, possible   infarct. Gated SPECT images reviewed in 3 planes show unremarkable LV systolic wall   thickening. There is no segmental wall motion abnormality of the left   ventricular myocardium. The calculated LV ejection fraction is 53%. Pulmonary uptake and left ventricular cavity size appear normal.        Impression : No ischemia. Possible small distal anterior infarct. LVEF 53%.        Signed by: Shobha Viera MD; Arturo Metcalf MD     Stress as above she remains asymptomatic cardiac wise  No plans for cath at this time  EF 53%  Continue toprol and hydralazine upon dc  She will need holter as outpatient    I will sign off for now    Follow up with Dr. Hoffman Half in 1 week Procedure explained. Informed consent obtained. Cetacaine 1 spray administered to back of throat. Viscous lidocaine 2ml administered via left nares.  Manometry probe place via left nares @ 50 cm. Patient tolerated procedure well. Discharge instructions reviewed. Discharged to home.

## (undated) DEVICE — TAPE SURG W4INXL11YD 2IN PERF LINERLESS NONWOVEN MEDFIX EZ

## (undated) DEVICE — REM POLYHESIVE ADULT PATIENT RETURN ELECTRODE: Brand: VALLEYLAB

## (undated) DEVICE — VALVE ANGIO ID0.11IN HEMSTAT MTL GUID WIRE INSRT TOOL AND

## (undated) DEVICE — WASTE KIT - ST MARY: Brand: MEDLINE INDUSTRIES, INC.

## (undated) DEVICE — CUSTOM KT PTCA INFL DEV K05 00052M

## (undated) DEVICE — CATH BLLN DIL 2.0X12MM RX -- EUPHORA

## (undated) DEVICE — APPLICATOR BNDG 1MM ADH PREMIERPRO EXOFIN

## (undated) DEVICE — SUT ETHLN 2-0 18IN FS BLK --

## (undated) DEVICE — STERILE POLYISOPRENE POWDER-FREE SURGICAL GLOVES WITH EMOLLIENT COATING: Brand: PROTEXIS

## (undated) DEVICE — SUTURE PROL SZ 5-0 L30IN NONABSORBABLE BLU L13MM RB-2 1/2 8710H

## (undated) DEVICE — Device: Brand: EAGLE EYE PLATINUM RX DIGITAL IVUS CATHETER

## (undated) DEVICE — ANGIOGRAPHY KIT

## (undated) DEVICE — ANGIOGRAPHIC CATHETER: Brand: IMPULSE™

## (undated) DEVICE — SPONGE,DRAIN,NONWVN,4"X4",6PLY,STRL,LF: Brand: MEDLINE

## (undated) DEVICE — 1200 GUARD II KIT W/5MM TUBE W/O VAC TUBE: Brand: GUARDIAN

## (undated) DEVICE — GLIDESHEATH SLENDER STAINLESS STEEL KIT: Brand: GLIDESHEATH SLENDER

## (undated) DEVICE — SUTURE VCRL SZ 3-0 L27IN ABSRB UD L26MM SH 1/2 CIR J416H

## (undated) DEVICE — SUT PROL 6-0 24IN BV1 DA BLU --

## (undated) DEVICE — AGENT HEMSTAT W4XL4IN OXIDIZED REGENERATED CELOS ABSRB SFT

## (undated) DEVICE — GUIDEWIRE VASC L260CM DIA0.035IN TIP L3MM STD EXCHG PTFE J

## (undated) DEVICE — GARMENT,MEDLINE,DVT,INT,CALF,MED, GEN2: Brand: MEDLINE

## (undated) DEVICE — GOWN,SIRUS,NONRNF,SETINSLV,2XL,18/CS: Brand: MEDLINE

## (undated) DEVICE — TUBING PRSS MON L6IN PVC M FEM CONN

## (undated) DEVICE — SUTURE MCRYL SZ 4-0 L27IN ABSRB UD L19MM PS-2 1/2 CIR PRIM Y426H

## (undated) DEVICE — RUNTHROUGH NS EXTRA FLOPPY PTCA GUIDEWIRE: Brand: RUNTHROUGH

## (undated) DEVICE — TUBING HYDR IRR --

## (undated) DEVICE — CATH GUID COR IMA 6FR 100CM -- LAUNCHER

## (undated) DEVICE — DEVICE VASC CLSR 6FR 7FR 10ML LOK SYR W BLLN CATHETER INTEGR

## (undated) DEVICE — HANDLE LT SNAP ON ULT DURABLE LENS FOR TRUMPF ALC DISPOSABLE

## (undated) DEVICE — Device

## (undated) DEVICE — SOLUTION IV 500ML 0.9% SOD CHL FLX CONT

## (undated) DEVICE — STRAP,POSITIONING,KNEE/BODY,FOAM,4X60": Brand: MEDLINE

## (undated) DEVICE — COPILOT BLEEDBACK CONTROL VALVE: Brand: COPILOT

## (undated) DEVICE — CATHETER ANGIO 4FR L100CM S STL NYL JL3.5 3 SEG BRAID SFT

## (undated) DEVICE — SPECIAL PROCEDURE DRAPE 32" X 34": Brand: SPECIAL PROCEDURE DRAPE

## (undated) DEVICE — CAROTID ARTERY SHUNT KIT,RADIOPAQUE LINE, STRAIGHT: Brand: ARGYLE

## (undated) DEVICE — PINNACLE INTRODUCER SHEATH: Brand: PINNACLE

## (undated) DEVICE — Z DISCONTINUED USE 2425483 (LOW STOCK PER MEDLINE) TAPE UMB L18IN DIA1/8IN WHT COT NONABSORBABLE W/O NDL FOR

## (undated) DEVICE — VASCULAR-RICHMOND-LF: Brand: MEDLINE INDUSTRIES, INC.

## (undated) DEVICE — DRAPE PRB US TRNSDCR 6X96IN --

## (undated) DEVICE — KIT MED IMAG CNTRST AGNT W/ IOPAMIDOL REUSE

## (undated) DEVICE — TR BAND RADIAL ARTERY COMPRESSION DEVICE: Brand: TR BAND

## (undated) DEVICE — INFECTION CONTROL KIT SYS

## (undated) DEVICE — PACK PROCEDURE SURG HRT CATH

## (undated) DEVICE — MAGNETIC INSTR DRAPE 20X16: Brand: MEDLINE INDUSTRIES, INC.

## (undated) DEVICE — SPLINT WR POS F/ARTERIAL ACC -- BX/10

## (undated) DEVICE — KIT MFLD ISOLATN NACL CNTRST PRT TBNG SPIK W/ PRSS TRNSDUC

## (undated) DEVICE — DRAIN SURG W7MMXL20CM SIL FULL PERF HUBLESS FLAT RADPQ STRP

## (undated) DEVICE — 3M™ TEGADERM™ TRANSPARENT FILM DRESSING FRAME STYLE, 1626W, 4 IN X 4-3/4 IN (10 CM X 12 CM), 50/CT 4CT/CASE: Brand: 3M™ TEGADERM™

## (undated) DEVICE — KIT HND CTRL 3 W STPCOCK ROT END 54IN PREM HI PRSS TBNG AT

## (undated) DEVICE — NEEDLE HYPO 25GA L1.5IN BVL ORIENTED ECLIPSE

## (undated) DEVICE — TRAY PREP DRY W/ PREM GLV 2 APPL 6 SPNG 2 UNDPD 1 OVERWRAP

## (undated) DEVICE — MASTISOL ADHESIVE LIQ 2/3ML

## (undated) DEVICE — SOLUTION IRRIG 1000ML H2O STRL BLT

## (undated) DEVICE — CATH URETH INTMIT ROB 16FR FUN -- CONVERT TO ITEM 179520

## (undated) DEVICE — WIPE 400300 MEROCEL 20PK INSTRUMENT: Brand: MEROCEL®